# Patient Record
Sex: MALE | Race: BLACK OR AFRICAN AMERICAN | NOT HISPANIC OR LATINO | Employment: OTHER | URBAN - METROPOLITAN AREA
[De-identification: names, ages, dates, MRNs, and addresses within clinical notes are randomized per-mention and may not be internally consistent; named-entity substitution may affect disease eponyms.]

---

## 2017-01-03 ENCOUNTER — TELEPHONE (OUTPATIENT)
Dept: PHARMACY | Facility: CLINIC | Age: 62
End: 2017-01-03

## 2017-01-03 ENCOUNTER — LAB VISIT (OUTPATIENT)
Dept: LAB | Facility: HOSPITAL | Age: 62
End: 2017-01-03
Attending: INTERNAL MEDICINE
Payer: COMMERCIAL

## 2017-01-03 DIAGNOSIS — Z94.0 KIDNEY REPLACED BY TRANSPLANT: ICD-10-CM

## 2017-01-03 LAB
ALBUMIN SERPL BCP-MCNC: 3.6 G/DL
ANION GAP SERPL CALC-SCNC: 10 MMOL/L
ANISOCYTOSIS BLD QL SMEAR: SLIGHT
BASOPHILS # BLD AUTO: ABNORMAL K/UL
BASOPHILS NFR BLD: 0 %
BUN SERPL-MCNC: 22 MG/DL
CALCIUM SERPL-MCNC: 9.6 MG/DL
CHLORIDE SERPL-SCNC: 101 MMOL/L
CO2 SERPL-SCNC: 23 MMOL/L
CREAT SERPL-MCNC: 1.5 MG/DL
DIFFERENTIAL METHOD: ABNORMAL
EOSINOPHIL # BLD AUTO: ABNORMAL K/UL
EOSINOPHIL NFR BLD: 1 %
ERYTHROCYTE [DISTWIDTH] IN BLOOD BY AUTOMATED COUNT: 16.7 %
EST. GFR  (AFRICAN AMERICAN): 58.1 ML/MIN/1.73 M^2
EST. GFR  (NON AFRICAN AMERICAN): 50.2 ML/MIN/1.73 M^2
GLUCOSE SERPL-MCNC: 332 MG/DL
HCT VFR BLD AUTO: 42.5 %
HGB BLD-MCNC: 13.5 G/DL
LYMPHOCYTES # BLD AUTO: ABNORMAL K/UL
LYMPHOCYTES NFR BLD: 14 %
MAGNESIUM SERPL-MCNC: 1.9 MG/DL
MCH RBC QN AUTO: 24.8 PG
MCHC RBC AUTO-ENTMCNC: 31.8 %
MCV RBC AUTO: 78 FL
MONOCYTES # BLD AUTO: ABNORMAL K/UL
MONOCYTES NFR BLD: 5 %
NEUTROPHILS NFR BLD: 79 %
NEUTS BAND NFR BLD MANUAL: 1 %
PHOSPHATE SERPL-MCNC: 2.4 MG/DL
PLATELET # BLD AUTO: 120 K/UL
PLATELET BLD QL SMEAR: ABNORMAL
PMV BLD AUTO: ABNORMAL FL
POLYCHROMASIA BLD QL SMEAR: ABNORMAL
POTASSIUM SERPL-SCNC: 5 MMOL/L
RBC # BLD AUTO: 5.45 M/UL
SODIUM SERPL-SCNC: 134 MMOL/L
TACROLIMUS BLD-MCNC: 8.6 NG/ML
WBC # BLD AUTO: 6.9 K/UL

## 2017-01-03 PROCEDURE — 80197 ASSAY OF TACROLIMUS: CPT

## 2017-01-03 PROCEDURE — 36415 COLL VENOUS BLD VENIPUNCTURE: CPT

## 2017-01-03 PROCEDURE — 83735 ASSAY OF MAGNESIUM: CPT

## 2017-01-03 PROCEDURE — 80069 RENAL FUNCTION PANEL: CPT

## 2017-01-03 PROCEDURE — 85007 BL SMEAR W/DIFF WBC COUNT: CPT

## 2017-01-03 PROCEDURE — 85027 COMPLETE CBC AUTOMATED: CPT

## 2017-01-03 NOTE — PROGRESS NOTES
Phos low -->  on higher phos diet. Glucose elevated --> please advise him to speak with endocrinology

## 2017-01-06 ENCOUNTER — TELEPHONE (OUTPATIENT)
Dept: TRANSPLANT | Facility: CLINIC | Age: 62
End: 2017-01-06

## 2017-01-06 ENCOUNTER — TELEPHONE (OUTPATIENT)
Dept: ENDOCRINOLOGY | Facility: CLINIC | Age: 62
End: 2017-01-06

## 2017-01-06 DIAGNOSIS — Z94.0 KIDNEY REPLACED BY TRANSPLANT: Primary | ICD-10-CM

## 2017-01-06 RX ORDER — INSULIN ASPART 100 [IU]/ML
INJECTION, SOLUTION INTRAVENOUS; SUBCUTANEOUS
Qty: 1 BOX | Refills: 4
Start: 2017-01-06 | End: 2017-01-11 | Stop reason: SDUPTHER

## 2017-01-06 NOTE — TELEPHONE ENCOUNTER
----- Message from Sylvie Frias MD sent at 1/3/2017  1:08 PM CST -----  Phos low -->  on higher phos diet. Glucose elevated --> please advise him to speak with endocrinology

## 2017-01-06 NOTE — TELEPHONE ENCOUNTER
Spoke to the pt. He stated that his BG's have been running high. The pt is taking Prednisone 10 mg. The pt denies drinking soda's or eating any sweets. The pt c/o numbness in his fingers and frequent urination. The pt would like to speak with you.

## 2017-01-06 NOTE — TELEPHONE ENCOUNTER
Spoke to the pt. Informed him to Increase Levemir to 28 units.  Increase Novolog to 12 units before breakfast and lunch.  Increase Novolog to 14 units before dinner.   Continue Novolog correction scale.   Advised the pt to keep follow-up 01/12/2016 for additional adjustments. Pt verbalized understanding.

## 2017-01-06 NOTE — TELEPHONE ENCOUNTER
Patient contacted, advised of MD review of labs.  No changed indicated at this time, reviewed plan for next labs and appt.  Patient verbalized understanding.   Reiterated high phos diet, also advised patient to contact sugar clinic to report readings and see if he can be seen prior to /12/17.

## 2017-01-06 NOTE — TELEPHONE ENCOUNTER
Please call pt.     Increase Levemir to 28 units.  Increase Novolog to 12 units before breakfast and lunch.  Increase Novolog to 14 units before dinner.   Continue Novolog correction scale.   Advise him to keep follow-up 01/12/2016 for additional adjustments.     Please triage call additionally if he requires additional assistance.

## 2017-01-06 NOTE — TELEPHONE ENCOUNTER
----- Message from Dina Chavez sent at 1/6/2017 10:43 AM CST -----  Contact: Patient  Patient is requesting a call back in regards to his sugar levels.    Please call patient at [phone].    1/1/2017          1/2/2017          1/3/2017          1/4/2017          1/5/2017          1/6/2017      BB - Before Breakfast  BL - Before Lunch  BD - Before Dinner

## 2017-01-09 ENCOUNTER — LAB VISIT (OUTPATIENT)
Dept: LAB | Facility: HOSPITAL | Age: 62
End: 2017-01-09
Attending: INTERNAL MEDICINE
Payer: COMMERCIAL

## 2017-01-09 DIAGNOSIS — Z94.0 STATUS POST KIDNEY TRANSPLANT: ICD-10-CM

## 2017-01-09 LAB
ALBUMIN SERPL BCP-MCNC: 3.4 G/DL
ANION GAP SERPL CALC-SCNC: 8 MMOL/L
ANISOCYTOSIS BLD QL SMEAR: SLIGHT
BASOPHILS NFR BLD: 2 %
BUN SERPL-MCNC: 19 MG/DL
CALCIUM SERPL-MCNC: 9.4 MG/DL
CHLORIDE SERPL-SCNC: 103 MMOL/L
CO2 SERPL-SCNC: 24 MMOL/L
CREAT SERPL-MCNC: 1.4 MG/DL
DIFFERENTIAL METHOD: ABNORMAL
EOSINOPHIL NFR BLD: 1 %
ERYTHROCYTE [DISTWIDTH] IN BLOOD BY AUTOMATED COUNT: 16.6 %
EST. GFR  (AFRICAN AMERICAN): >60 ML/MIN/1.73 M^2
EST. GFR  (NON AFRICAN AMERICAN): 54.6 ML/MIN/1.73 M^2
GIANT PLATELETS BLD QL SMEAR: PRESENT
GLUCOSE SERPL-MCNC: 244 MG/DL
HCT VFR BLD AUTO: 41.6 %
HGB BLD-MCNC: 13.1 G/DL
LYMPHOCYTES NFR BLD: 19 %
MAGNESIUM SERPL-MCNC: 1.8 MG/DL
MCH RBC QN AUTO: 24.5 PG
MCHC RBC AUTO-ENTMCNC: 31.5 %
MCV RBC AUTO: 78 FL
METAMYELOCYTES NFR BLD MANUAL: 1 %
MONOCYTES NFR BLD: 4 %
NEUTROPHILS NFR BLD: 70 %
NEUTS BAND NFR BLD MANUAL: 3 %
OVALOCYTES BLD QL SMEAR: ABNORMAL
PHOSPHATE SERPL-MCNC: 2.7 MG/DL
PLATELET # BLD AUTO: 122 K/UL
PMV BLD AUTO: ABNORMAL FL
POIKILOCYTOSIS BLD QL SMEAR: SLIGHT
POLYCHROMASIA BLD QL SMEAR: ABNORMAL
POTASSIUM SERPL-SCNC: 4.8 MMOL/L
RBC # BLD AUTO: 5.35 M/UL
SODIUM SERPL-SCNC: 135 MMOL/L
TACROLIMUS BLD-MCNC: 7.7 NG/ML
WBC # BLD AUTO: 6.57 K/UL

## 2017-01-09 PROCEDURE — 85027 COMPLETE CBC AUTOMATED: CPT

## 2017-01-09 PROCEDURE — 80197 ASSAY OF TACROLIMUS: CPT

## 2017-01-09 PROCEDURE — 83735 ASSAY OF MAGNESIUM: CPT

## 2017-01-09 PROCEDURE — 36415 COLL VENOUS BLD VENIPUNCTURE: CPT

## 2017-01-09 PROCEDURE — 80069 RENAL FUNCTION PANEL: CPT

## 2017-01-09 PROCEDURE — 85007 BL SMEAR W/DIFF WBC COUNT: CPT

## 2017-01-11 ENCOUNTER — TELEPHONE (OUTPATIENT)
Dept: PHARMACY | Facility: CLINIC | Age: 62
End: 2017-01-11

## 2017-01-11 RX ORDER — ERGOCALCIFEROL 1.25 MG/1
50000 CAPSULE ORAL
Qty: 30 CAPSULE | Refills: 0 | OUTPATIENT
Start: 2017-01-11

## 2017-01-11 RX ORDER — DOCUSATE SODIUM 100 MG/1
100 CAPSULE, LIQUID FILLED ORAL 2 TIMES DAILY PRN
Qty: 60 CAPSULE | Refills: 3 | OUTPATIENT
Start: 2017-01-11

## 2017-01-11 RX ORDER — INSULIN ASPART 100 [IU]/ML
INJECTION, SOLUTION INTRAVENOUS; SUBCUTANEOUS
Qty: 1 BOX | Refills: 4 | Status: SHIPPED | OUTPATIENT
Start: 2017-01-11 | End: 2017-01-12 | Stop reason: SDUPTHER

## 2017-01-11 NOTE — TELEPHONE ENCOUNTER
----- Message from Lyla Ramos sent at 1/11/2017 11:43 AM CST -----  Contact: PATIENT  [phone]  veronica  - patient called stating that he has only one pen left - pt needs a refill - call back number [phone]  Thanks

## 2017-01-11 NOTE — TELEPHONE ENCOUNTER
DOCUMENTATION ONLY  [employer] prior authorization approved on 01/10/2017 x 12 weeks.  Approval date: 01/10/2017 to 04/03/2017  PA# [id number]    [employer] co-pay coupon card information:   BIN: [id number]  RxPCN: [id number]  Issuer: ([id number]  Group Number: [id number]  ID#: [id number]

## 2017-01-12 ENCOUNTER — OFFICE VISIT (OUTPATIENT)
Dept: ENDOCRINOLOGY | Facility: CLINIC | Age: 62
End: 2017-01-12
Payer: COMMERCIAL

## 2017-01-12 ENCOUNTER — CLINICAL SUPPORT (OUTPATIENT)
Dept: SMOKING CESSATION | Facility: CLINIC | Age: 62
End: 2017-01-12
Payer: COMMERCIAL

## 2017-01-12 DIAGNOSIS — T38.0X5D ADVERSE EFFECT OF GLUCOCORTICOID OR SYNTHETIC ANALOGUE, SUBSEQUENT ENCOUNTER: ICD-10-CM

## 2017-01-12 DIAGNOSIS — F17.200 NICOTINE DEPENDENCE: Primary | ICD-10-CM

## 2017-01-12 DIAGNOSIS — Z94.0 S/P KIDNEY TRANSPLANT: ICD-10-CM

## 2017-01-12 DIAGNOSIS — Z71.89 COUNSELING REGARDING GOALS OF CARE: ICD-10-CM

## 2017-01-12 DIAGNOSIS — T45.1X5D ADVERSE EFFECT OF IMMUNOSUPPRESSIVE DRUG, SUBSEQUENT ENCOUNTER: ICD-10-CM

## 2017-01-12 DIAGNOSIS — E11.42 DIABETIC POLYNEUROPATHY ASSOCIATED WITH TYPE 2 DIABETES MELLITUS: Primary | ICD-10-CM

## 2017-01-12 PROCEDURE — 90832 PSYTX W PT 30 MINUTES: CPT | Mod: S$GLB,,,

## 2017-01-12 PROCEDURE — 99213 OFFICE O/P EST LOW 20 MIN: CPT | Mod: S$GLB,,, | Performed by: NURSE PRACTITIONER

## 2017-01-12 PROCEDURE — 3060F POS MICROALBUMINURIA REV: CPT | Mod: S$GLB,,, | Performed by: NURSE PRACTITIONER

## 2017-01-12 PROCEDURE — 2022F DILAT RTA XM EVC RTNOPTHY: CPT | Mod: S$GLB,,, | Performed by: NURSE PRACTITIONER

## 2017-01-12 PROCEDURE — 3045F PR MOST RECENT HEMOGLOBIN A1C LEVEL 7.0-9.0%: CPT | Mod: S$GLB,,, | Performed by: NURSE PRACTITIONER

## 2017-01-12 PROCEDURE — 1159F MED LIST DOCD IN RCRD: CPT | Mod: S$GLB,,, | Performed by: NURSE PRACTITIONER

## 2017-01-12 RX ORDER — INSULIN LISPRO 100 [IU]/ML
INJECTION, SOLUTION INTRAVENOUS; SUBCUTANEOUS
Qty: 2 BOX | Refills: 11 | Status: SHIPPED | OUTPATIENT
Start: 2017-01-12 | End: 2017-04-07 | Stop reason: SDUPTHER

## 2017-01-12 RX ORDER — ERGOCALCIFEROL 1.25 MG/1
50000 CAPSULE ORAL
Qty: 4 CAPSULE | Refills: 4 | Status: SHIPPED | OUTPATIENT
Start: 2017-01-12 | End: 2017-01-12 | Stop reason: SDUPTHER

## 2017-01-12 RX ORDER — ERGOCALCIFEROL 1.25 MG/1
50000 CAPSULE ORAL
Qty: 4 CAPSULE | Refills: 4 | Status: SHIPPED | OUTPATIENT
Start: 2017-01-12 | End: 2017-02-05 | Stop reason: ALTCHOICE

## 2017-01-12 RX ORDER — INSULIN ASPART 100 [IU]/ML
INJECTION, SOLUTION INTRAVENOUS; SUBCUTANEOUS
Qty: 2 BOX | Refills: 6 | Status: SHIPPED | OUTPATIENT
Start: 2017-01-12 | End: 2017-01-18 | Stop reason: ALTCHOICE

## 2017-01-12 NOTE — TELEPHONE ENCOUNTER
Initial Harvoni 90/400mg consult completed on 2017. Harvoni 90/400mg picked up today. $5.00 copay. Patient will start Harvoni 90/400mg on 2017. Confirmed 2 patient identifiers - name and . Therapy Appropriate.    Harvoni- Take one tablet by mouth daily x 12 weeks  Counseling was reviewed:   1. Patient MUST take Harvoni at the SAME time every day.   2. Patient MUST avoid acid reducers without consulting with myself or provider first. Antacids are to be spaced out at least 4 hours apart from Harvoni.  Sodium Bicarb will be dosed only at 8am and 2pm.  Famotidine is to be taken AT THE SAME TIME as Harvoni on an EMPTY STOMACH at 8pm.   3. Side effects include headaches and fatigue.   Headache: Patient may treat with OTC remedies. If Tylenol is used, dose should not exceed 2000mg per day.   4. Medication list reviewed. No DDIs or allergies noted. Patient MUST contact myself or provider prior to starting any new OTC, herbal, or prescription drugs to avoid potential DDIs.    DDI: Medication list reviewed and potential DDIs addressed.    Discussed the importance of staying well hydrated while on therapy. Compliance stressed - patient to take missed doses as soon as remembered, but NOT to take 2 doses in one day. Patient will report questions or concerns to myself or practitioner. Patient verbalizes understanding. Patient plans to start Harvoni on 2017. Consultation included: indication; goals of treatment; administration; storage and handling; side effects; how to handle side effects; the importance of compliance; how to handle missed doses; the importance of laboratory monitoring; the importance of keeping all follow up appointments.  Patient understands to report any medication changes to [facility] and provider. All questions answered and addressed to patients satisfaction.  I will f/u with patient in 1 weeks from start, and [facility] will contact patient in 3 weeks to coordinate next refill.

## 2017-01-12 NOTE — PROGRESS NOTES
"Individual Follow-Up Form    1/12/2017    Quit Date: 12/9/16    Clinical Status of Patient: Outpatient    Length of Service: 30mins    Continuing Medication: Yes    Other Medications: N/A     Target Symptoms: Withdrawal and medication side effects. The following were  rated moderate (3) to severe (4) on TCRS:  · Moderate (3): Increased Appetite  · Severe (4): N/A    Comments: Pt has not smoked since December 9th, down from half a pack a day, Pt denies any urges or cravings, states he does not miss smoking , "even after meals", states things are going well post kidney transplant, though Pt would like to start exercising again, Pt feels Patches and Lozenges are helping, planning on staying on prescribed nrt medications for at least three to four months, LPC praised Pt for progress made thus far, especially in spite of the fact the Pt's spouse is still smoking.     Diagnosis: F17.200    Next Visit: 2/6/17  "

## 2017-01-12 NOTE — PROGRESS NOTES
CC:     This patient returns to endocrine clinic for blood sugar log review.  Ravi Zamorano with T2DM since 2001. H/o ESRD and PD: now s/p kidney transplant 11/05/2016.  Followed by endocrine in hospital. Last seen by JIL Baeza NP late November 2016 and is now being seen by me for the first time.     Current Steroid Dose/Taper: prednisone 20 mg PO QD until ; 15 mg PO QD 12/8-1/7; 10 mg PO QD 1/8-2/7; then 5 mg PO QD thereafter  On immunosuppression.       Current DM meds: novolog 12/12/14 units, levemir 28 units daily, tradjenta 5 mg daily     No missed doses of medication.     Monitoring BG ac/hs, brought log to clinic revealing the following readings:    160s-270s     Not experiencing hypoglycemia.  Eating 3 meals daily, plus occ snacking.  No formal exercise.     Ros:  Denies chest pain, SOB  Denies n/v   Lab Results   Component Value Date    HGBA1C 7.7 (H) 11/05/2016    HGBA1C 7.5 (H) 10/23/2014    HGBA1C 7.2 (H) 07/29/2014       ASSESSMENT and PLAN:    1. T2DM uncontrolled, BG goal 140-180 mg/dl, f/u in sugar clinic-2 weeks  Change levemir to 32 units daily  novolog to 16 units ac w/ mod dose correction scale 180-230 +2, etc  Snack 4 units w/ novolog  fit2me info given for foods/nutrition   rx for vit d, novolog flexpen    2. Steroids/immunosuppressants- may increase insulin resistance     3. S/p kidney transplant - managed per KTS    4. Counseling         Time spent: 20 minutes, >50% in counseling

## 2017-01-12 NOTE — TELEPHONE ENCOUNTER
----- Message from Kelly Lopez sent at 1/12/2017  3:25 PM CST -----  Contact: [facility] called and said the insulin aspart (NOVOLOG) 100 unit/mL InPn pen needs to be changed to the humalog because of insurance.

## 2017-01-16 ENCOUNTER — LAB VISIT (OUTPATIENT)
Dept: LAB | Facility: HOSPITAL | Age: 62
End: 2017-01-16
Attending: INTERNAL MEDICINE
Payer: COMMERCIAL

## 2017-01-16 DIAGNOSIS — Z94.0 KIDNEY REPLACED BY TRANSPLANT: ICD-10-CM

## 2017-01-16 LAB
ALBUMIN SERPL BCP-MCNC: 3.4 G/DL
ANION GAP SERPL CALC-SCNC: 6 MMOL/L
ANISOCYTOSIS BLD QL SMEAR: SLIGHT
BASOPHILS # BLD AUTO: ABNORMAL K/UL
BASOPHILS NFR BLD: 1 %
BUN SERPL-MCNC: 17 MG/DL
BURR CELLS BLD QL SMEAR: ABNORMAL
CALCIUM SERPL-MCNC: 9 MG/DL
CHLORIDE SERPL-SCNC: 107 MMOL/L
CO2 SERPL-SCNC: 25 MMOL/L
CREAT SERPL-MCNC: 1.2 MG/DL
DACRYOCYTES BLD QL SMEAR: ABNORMAL
DIFFERENTIAL METHOD: ABNORMAL
EOSINOPHIL # BLD AUTO: ABNORMAL K/UL
EOSINOPHIL NFR BLD: 0 %
ERYTHROCYTE [DISTWIDTH] IN BLOOD BY AUTOMATED COUNT: 16.7 %
EST. GFR  (AFRICAN AMERICAN): >60 ML/MIN/1.73 M^2
EST. GFR  (NON AFRICAN AMERICAN): >60 ML/MIN/1.73 M^2
GLUCOSE SERPL-MCNC: 165 MG/DL
HCT VFR BLD AUTO: 40.8 %
HGB BLD-MCNC: 12.9 G/DL
HYPOCHROMIA BLD QL SMEAR: ABNORMAL
LYMPHOCYTES # BLD AUTO: ABNORMAL K/UL
LYMPHOCYTES NFR BLD: 16 %
MAGNESIUM SERPL-MCNC: 1.5 MG/DL
MCH RBC QN AUTO: 24.6 PG
MCHC RBC AUTO-ENTMCNC: 31.6 %
MCV RBC AUTO: 78 FL
MONOCYTES # BLD AUTO: ABNORMAL K/UL
MONOCYTES NFR BLD: 10 %
NEUTROPHILS NFR BLD: 68 %
NEUTS BAND NFR BLD MANUAL: 5 %
OVALOCYTES BLD QL SMEAR: ABNORMAL
PHOSPHATE SERPL-MCNC: 2.9 MG/DL
PLATELET # BLD AUTO: 124 K/UL
PMV BLD AUTO: ABNORMAL FL
POIKILOCYTOSIS BLD QL SMEAR: SLIGHT
POLYCHROMASIA BLD QL SMEAR: ABNORMAL
POTASSIUM SERPL-SCNC: 4.3 MMOL/L
RBC # BLD AUTO: 5.24 M/UL
SCHISTOCYTES BLD QL SMEAR: ABNORMAL
SODIUM SERPL-SCNC: 138 MMOL/L
TACROLIMUS BLD-MCNC: 11 NG/ML
WBC # BLD AUTO: 5.95 K/UL

## 2017-01-16 PROCEDURE — 80069 RENAL FUNCTION PANEL: CPT

## 2017-01-16 PROCEDURE — 36415 COLL VENOUS BLD VENIPUNCTURE: CPT

## 2017-01-16 PROCEDURE — 80197 ASSAY OF TACROLIMUS: CPT

## 2017-01-16 PROCEDURE — 83735 ASSAY OF MAGNESIUM: CPT

## 2017-01-16 PROCEDURE — 85007 BL SMEAR W/DIFF WBC COUNT: CPT

## 2017-01-16 PROCEDURE — 85027 COMPLETE CBC AUTOMATED: CPT

## 2017-01-16 NOTE — PROGRESS NOTES
Prograf elevated --> but has been steady on this dose for a few weeks thus repeat level to determine need for dose adjustment.

## 2017-01-16 NOTE — PROGRESS NOTES
He is scheduled for labs on 1/23, do you want it repeated before then?   Any adjustments with Mag oxide? Or repeat Mag as well

## 2017-01-17 ENCOUNTER — TELEPHONE (OUTPATIENT)
Dept: TRANSPLANT | Facility: CLINIC | Age: 62
End: 2017-01-17

## 2017-01-17 ENCOUNTER — NURSE TRIAGE (OUTPATIENT)
Dept: ADMINISTRATIVE | Facility: CLINIC | Age: 62
End: 2017-01-17

## 2017-01-17 NOTE — TELEPHONE ENCOUNTER
Pt contacted and advised of MD's lab review and instructions.  Pt. repeated instructions and verbalized understanding.

## 2017-01-17 NOTE — TELEPHONE ENCOUNTER
----- Message from Sylvie Frias MD sent at 1/16/2017 11:46 PM CST -----  Yes please get repeat labs later this week and we can see what repeat Mg is as well.     Sylvie     ----- Message -----     From: Kena Robles RN     Sent: 1/16/2017   4:26 PM       To: Sylvie Frias MD    He is scheduled for labs on 1/23, do you want it repeated before then?   Any adjustments with Mag oxide? Or repeat Mag as well

## 2017-01-17 NOTE — TELEPHONE ENCOUNTER
Reason for Disposition   Caller has already spoken with the PCP and has no further questions.    Protocols used: ST NO CONTACT OR DUPLICATE CONTACT CALL-A-AH    No longer needed out service    Josephine King RN

## 2017-01-18 ENCOUNTER — LAB VISIT (OUTPATIENT)
Dept: LAB | Facility: HOSPITAL | Age: 62
End: 2017-01-18
Attending: INTERNAL MEDICINE
Payer: COMMERCIAL

## 2017-01-18 ENCOUNTER — TELEPHONE (OUTPATIENT)
Dept: HEPATOLOGY | Facility: CLINIC | Age: 62
End: 2017-01-18

## 2017-01-18 ENCOUNTER — OFFICE VISIT (OUTPATIENT)
Dept: TRANSPLANT | Facility: CLINIC | Age: 62
End: 2017-01-18
Payer: COMMERCIAL

## 2017-01-18 VITALS
TEMPERATURE: 98 F | HEIGHT: 73 IN | OXYGEN SATURATION: 99 % | RESPIRATION RATE: 18 BRPM | HEART RATE: 93 BPM | SYSTOLIC BLOOD PRESSURE: 129 MMHG | DIASTOLIC BLOOD PRESSURE: 81 MMHG | WEIGHT: 185.44 LBS | BODY MASS INDEX: 24.58 KG/M2

## 2017-01-18 DIAGNOSIS — Z94.0 KIDNEY REPLACED BY TRANSPLANT: ICD-10-CM

## 2017-01-18 DIAGNOSIS — Z94.0 S/P KIDNEY TRANSPLANT: ICD-10-CM

## 2017-01-18 DIAGNOSIS — E87.20 ACIDOSIS: ICD-10-CM

## 2017-01-18 DIAGNOSIS — Z29.89 PROPHYLACTIC IMMUNOTHERAPY: ICD-10-CM

## 2017-01-18 DIAGNOSIS — N18.2 CKD (CHRONIC KIDNEY DISEASE) STAGE 2, GFR 60-89 ML/MIN: Primary | Chronic | ICD-10-CM

## 2017-01-18 DIAGNOSIS — B18.2 CHRONIC HEPATITIS C WITHOUT HEPATIC COMA: Primary | ICD-10-CM

## 2017-01-18 DIAGNOSIS — N40.1 BENIGN PROSTATIC HYPERTROPHY WITH URINARY FREQUENCY: ICD-10-CM

## 2017-01-18 DIAGNOSIS — I15.0 RENOVASCULAR HYPERTENSION: ICD-10-CM

## 2017-01-18 DIAGNOSIS — Z79.60 LONG-TERM USE OF IMMUNOSUPPRESSANT MEDICATION: ICD-10-CM

## 2017-01-18 DIAGNOSIS — E83.39 HYPOPHOSPHATEMIA: ICD-10-CM

## 2017-01-18 DIAGNOSIS — R35.0 BENIGN PROSTATIC HYPERTROPHY WITH URINARY FREQUENCY: ICD-10-CM

## 2017-01-18 LAB — TACROLIMUS BLD-MCNC: 12.6 NG/ML

## 2017-01-18 PROCEDURE — 99999 PR PBB SHADOW E&M-EST. PATIENT-LVL III: CPT | Mod: PBBFAC,,, | Performed by: INTERNAL MEDICINE

## 2017-01-18 PROCEDURE — 80197 ASSAY OF TACROLIMUS: CPT

## 2017-01-18 PROCEDURE — 36415 COLL VENOUS BLD VENIPUNCTURE: CPT

## 2017-01-18 PROCEDURE — 1159F MED LIST DOCD IN RCRD: CPT | Mod: S$GLB,,, | Performed by: INTERNAL MEDICINE

## 2017-01-18 PROCEDURE — 99215 OFFICE O/P EST HI 40 MIN: CPT | Mod: S$GLB,,, | Performed by: INTERNAL MEDICINE

## 2017-01-18 RX ORDER — TACROLIMUS 1 MG/1
1 CAPSULE ORAL EVERY 12 HOURS
Qty: 60 CAPSULE | Refills: 11 | Status: SHIPPED | OUTPATIENT
Start: 2017-01-18 | End: 2017-01-30 | Stop reason: SDUPTHER

## 2017-01-18 NOTE — LETTER
January 18, 2017        Alexi Glasgow  [address]  [address]  [facility] [address]  Phone: [phone]  Fax: [phone]             [facility]  [address]  [facility] [address]  Phone: [phone]   Patient: Ravi Zamorano   MR Number: [id number]   YOB: 1955   Date of Visit: 1/18/2017       Dear Dr. Alexi Glasgow    Thank you for referring Ravi Zamorano to me for evaluation. Attached you will find relevant portions of my assessment and plan of care.    If you have questions, please do not hesitate to call me. I look forward to following Ravi Zamorano along with you.    Sincerely,    Risa Blackman MD    Enclosure    If you would like to receive this communication electronically, please contact [email or web address] or [phone] to request [employer] Link access.    [employer] Link is a tool which provides read-only access to select patient information with whom you have a relationship. Its easy to use and provides real time access to review your patients record including encounter summaries, notes, results, and demographic information.    If you feel you have received this communication in error or would no longer like to receive these types of communications, please e-mail [email or web address]

## 2017-01-18 NOTE — PROGRESS NOTES
Kidney Post-Transplant Assessment    Referring Physician: Alexi Glasgow  Current Nephrologist: Alexi Glasgow    ORGAN: RIGHT KIDNEY  Donor Type:  - brain death  PHS Increased Risk: yes  Cold Ischemia: 314 mins  Induction Medications: thymoglobulin    Subjective:     CC:  Reassessment of renal allograft function and management of chronic immunosuppression.    HPI:  Mr. Zamorano is a 61 y.o. year old Black or  male who received a  - brain death kidney transplant on 16. His most recent creatinine is 1.2. He takes mycophenolate mofetil, prednisone and tacrolimus for maintenance immunosuppression. His post transplant course has been uncomplicated to date.    Pertinent History:  -ESRD presumed secondary to DM/HTN who was on RRT since 5/6/15  -PHS increased risk DDRT on 16 (THYMO induction given HCV POS/POS) with  KDPI 85%  -Donor RPR positive thus recipient received PCN x3 doses;   -CMV status D+/R+   -HCV rx'd dom [place] 2016    Ravi reports no complaints. Upon completing ROS, though, he mentions he still urinates q1 hr at night. He has no pain over allograft or dysuria or foul odor. He has no concerns.    Review of Systems   Constitutional: Negative.  Negative for fatigue.   Eyes: Negative for visual disturbance.   Respiratory: Negative for shortness of breath.    Cardiovascular: Negative for chest pain and leg swelling.   Gastrointestinal: Negative for abdominal pain.   Genitourinary: Positive for frequency (Predating transplant). Negative for dysuria, flank pain and urgency.   Musculoskeletal: Negative for joint swelling.   Skin: Negative for rash.   Allergic/Immunologic: Positive for immunocompromised state.   Neurological: Negative for tremors and headaches.   Hematological: Does not bruise/bleed easily.   Psychiatric/Behavioral: Negative.        Objective:   Blood pressure 129/81, pulse 93, temperature 97.7 °F (36.5 °C), temperature source Oral, resp.  "rate 18, height 6' 1" (1.854 m), weight 84.1 kg (185 lb 6.5 oz), SpO2 99 %.body mass index is 24.46 kg/(m^2).    Physical Exam   Constitutional: He is oriented to person, place, and time. He appears well-developed and well-nourished.   HENT:   Head: Normocephalic.   Eyes: Conjunctivae are normal.   Neck: No JVD present.   Pulmonary/Chest: Effort normal and breath sounds normal.   Abdominal: Soft. He exhibits no mass. There is no tenderness.   Musculoskeletal: He exhibits no edema.   Lymphadenopathy:     He has no cervical adenopathy.   Neurological: He is alert and oriented to person, place, and time.   Skin: Skin is dry. No rash noted.   Psychiatric: He has a normal mood and affect. Judgment normal.         Labs:  Lab Results   Component Value Date    WBC 5.95 01/16/2017    HGB 12.9 (L) 01/16/2017    HCT 40.8 01/16/2017     01/16/2017    K 4.3 01/16/2017     01/16/2017    CO2 25 01/16/2017    BUN 17 01/16/2017    CREATININE 1.2 01/16/2017    EGFRNONAA >60.0 01/16/2017    CALCIUM 9.0 01/16/2017    PHOS 2.9 01/16/2017    MG 1.5 (L) 01/16/2017    ALBUMIN 3.4 (L) 01/16/2017    AST 23 12/05/2016    ALT 27 12/05/2016    UTPCR 0.13 12/28/2016    .0 (H) 11/08/2016    TACROLIMUS 12.6 01/18/2017   Labs were reviewed with the patient    Assessment:     1. CKD (chronic kidney disease) stage 2, GFR 60-89 ml/min    2. S/P cadaveric kidney transplant 11/5/2016. ESRD secondary to HTN/DMII    3. Hypophosphatemia    4. Prophylactic immunotherapy    5. Renovascular hypertension    6. Acidosis    7. Long-term use of immunosuppressant medication    8. Benign prostatic hypertrophy with urinary frequency      Plan:      Allograft function assessment   CKDII s/p kidney transplantation- with excellent fcn of allograft. Continue to monitor renal function and electrolytes, HTN, secondary hyperparathyroidism and other issues related to underlying ESRD.    Lab Results   Component Value Date    CREATININE 1.2 01/16/2017 "     Immunosuppression evaluation  -Tacrolimus lowered to 1 mg BID (2/1). Cont MMF w/o changes.  -Will trend immunosuppression levels drug toxicity; Also monitor for med-related side effects.        Hepatitis C infection, awaiting genotype confirmation  -On Harvoni    Urinary frequency, predating transplant - has not seen  yet since last visit  -Urology consult will be requested (he wishes to see female). Symptoms are suspicious of prostatism.    Evaluation for bone marrow suppression (r/t immunosuppression toxicity or infection)  Lab Results   Component Value Date    WBC 5.95 01/16/2017    HGB 12.9 (L) 01/16/2017    HCT 40.8 01/16/2017     (L) 01/16/2017   All are within acceptable range, continue close monitoring    Anti-infective prophylaxis  - continue Bactrim for PJP prophylaxis until 11/5/17  - continue Valcyte for CMV prophylaxis until 2/5/17    Renal hypertension  BP well controlled. Cont to monitor.    Metabolic bone disease [Secondary hyperparathyroidism, Phosphorus metabolism disorders]  Lab Results   Component Value Date    .0 (H) 11/08/2016    CALCIUM 9.0 01/16/2017    CAION 1.11 11/06/2016    PHOS 2.9 01/16/2017   -Secondary hyperparathyroidism to be managed by nephrologist  -Hypophosphatemia - corrected. Stop KPN suppl and trend.    Assessment of electrolytes  Lab Results   Component Value Date     01/16/2017    K 4.3 01/16/2017    CO2 25 01/16/2017     01/16/2017    MG 1.5 (L) 01/16/2017   All electrolytes are noted, no major issues.  Mild hypomagnesemia Noted, on mag ox supplementation. Continue monitoring  Acidosis resolved- stop HCO3 today    Screening for BK infection to prevent allograft dysfunction  Lab Results   Component Value Date    BKVIRUSPCRQB <250 12/05/2016    -Not detected Dec 2016  Continue blood PCR screening as per program guidelines to prevent BK viremia and nephropathy leading to allograft dysfunction and potential graft failure    Screening for  proteinuria  Lab Results   Component Value Date    PROTEINURINE 8 12/28/2016    CREATRANDUR 62.0 12/28/2016    UTPCR 0.13 12/28/2016     -Minimal. Continue monitoring as per program guidelines     Follow-up:   Clinic: return to transplant clinic weekly for the first month after transplant; every 2 weeks during months 2-3; then at 6-, 9-, 12-, 18-, 24-, and 36- months post-transplant to reassess for complications from immunosuppression toxicity and monitor for rejection.  Annually thereafter.    Labs: since patient remains at high risk for rejection and drug-related complications that warrant close monitoring, labs will be ordered as follows: continue twice weekly CBC, renal panel, and drug level for first month; then same labs once weekly through 3rd month post-transplant.  Urine for UA and protein/creatinine ratio monthly.  Urine BK - PCR at 1-, 3-, 6-, 9-, 12-, 18-, 24-, and 36- months post-transplant.  Hepatic panel at 1-, 2-, 3-, 6-, 9-, 12-, 18-, 24-, and 36- months post-transplant.    Risa Blackman MD       Education:   Material provided to the patient.  Patient reminded to call with any health changes since these can be early signs of significant complications.  Also, I advised the patient to be sure any new medications or changes of old medications are discussed with either a pharmacist or physician knowledgeable with transplant to avoid rejection/drug toxicity related to significant drug interactions.

## 2017-01-18 NOTE — MR AVS SNAPSHOT
[facility]- Transplant  [address]  [place] [address]  Phone: [phone]                  Ravi Zamorano   2017 1:30 PM   Office Visit    Description:  Male : 1955   Provider:  Risa Blackman MD   Department:  [facility]                To Do List           Future Appointments        Provider Department Dept Phone    2017 8:50 AM LAB, TRANSPLANT [facility] [phone]    2017 8:35 AM LAB, TRANSPLANT [facility] [phone]    2017 8:35 AM LAB, TRANSPLANT [facility] [phone]    2017 9:15 AM SPECIMEN LAB, TRANSPLANT [facility] [phone]    2017 1:30 PM Risa Blackman MD [facility] [phone]      Goals (5 Years of Data)     None       These Medications        Disp Refills Start End    tacrolimus (PROGRAF) 1 MG Cap 60 capsule 11 2017     Take 1 capsule (1 mg total) by mouth every 12 (twelve) hours. Z94.0 kidney transplant 2016 - Oral    Pharmacy: [facility] [place] [address] Ph #: [phone]       Notes to Pharmacy: Kidney Transplant z94.0; 16      [facility] On Call     [facility] Care Line - 24/ Assistance  Registered nurses in the [facility] Center provide clinical advisement, health education, appointment booking, and other advisory services.  Call for this free service at [phone].             Medications           Message regarding Medications     Verify the changes and/or additions to your medication regime listed below are the same as discussed with your clinician today.  If any of these changes or additions are incorrect, please notify your healthcare provider.        CHANGE how you are taking these medications     Start Taking Instead of    tacrolimus (PROGRAF) 1 MG Cap tacrolimus (PROGRAF) 1 MG Cap    Dosage:  Take 1 capsule (1 mg total) by  mouth every 12 (twelve) hours. Z94.0 kidney transplant 11/5/2016 Dosage:  Take 2 mg in the AM by mouth and 1 mg in the PM by mouth Z94.0 kidney transplant 11/5/2016    Reason for Change:  Reorder       STOP taking these medications     insulin aspart (NOVOLOG) 100 unit/mL InPn pen Inject into skin 16 units w/ meals. Plus correction with goal 180, ISF 25, 180-230 +2 231-280 +4, 281-330 +6, 331-380 +8.    k phos di & mono-sod phos mono (K-PHOS-NEUTRAL) 250 mg Tab Take 1 tablet by mouth 2 (two) times daily.    sodium bicarbonate 650 MG tablet Take 1 tablet (650 mg total) by mouth 2 (two) times daily. Take at 8 am and 2 pm daily           Verify that the below list of medications is an accurate representation of the medications you are currently taking.  If none reported, the list may be blank. If incorrect, please contact your healthcare provider. Carry this list with you in case of emergency.           Current Medications     aspirin 81 MG Chew Take 81 mg by mouth once daily.    atorvastatin (LIPITOR) 10 MG tablet Take 10 mg by mouth once daily.    blood sugar diagnostic Strp 1 strip by Misc.(Non-Drug; Combo Route) route 4 (four) times daily before meals and nightly. e11.65    blood-glucose meter (FREESTYLE SYSTEM KIT) kit Use as instructed e11.65 may substitute meter as covered by insurance    carvedilol (COREG) 12.5 MG tablet Take 12.5 mg by mouth 2 (two) times daily.    docusate sodium (COLACE) 100 MG capsule Take 1 capsule (100 mg total) by mouth 2 (two) times daily as needed for Constipation.    ergocalciferol (ERGOCALCIFEROL) 50,000 unit Cap Take 1 capsule (50,000 Units total) by mouth every 7 days.    famotidine (PEPCID) 20 MG tablet Take 1 tablet (20 mg total) by mouth every evening.    HARVONI  mg Tab Take 1 tablet by mouth once daily. Take at same time as Famotidine 20mg    insulin detemir (LEVEMIR FLEXTOUCH) 100 unit/mL (3 mL) SubQ InPn pen Inject 28 Units into the skin once daily.    insulin lispro  "(HUMALOG KWIKPEN) 100 unit/mL InPn pen Inject 16 units w/ meals plus scale as directed. 4 units w/ bedtime snack.    lancets Misc 1 each by Misc.(Non-Drug; Combo Route) route 4 (four) times daily before meals and nightly. e11.65    linagliptin (TRADJENTA) 5 mg Tab tablet Take 1 tablet (5 mg total) by mouth once daily.    magnesium oxide (MAG-OX) 400 mg tablet Take 1 tablet (400 mg total) by mouth 2 (two) times daily.    multivitamin (THERAGRAN) tablet Take 1 tablet by mouth once daily.    mycophenolate (CELLCEPT) 250 mg Cap Take 4 capsules (1,000 mg total) by mouth 2 (two) times daily.    nicotine (NICODERM CQ) 7 mg/24 hr Place 1 patch onto the skin once daily. NAME BRAND ONLY    nicotine polacrilex 2 MG Lozg Take 1 lozenge (2 mg total) by mouth as needed (6-16 lozenges daily as needed). MINT    nifedipine 30 MG ORAL TR24 (PROCARDIA-XL) 30 MG (OSM) 24 hr tablet Take 1 tablet (30 mg total) by mouth once daily.    ONETOUCH ULTRA TEST Strp     pen needle, diabetic 32 gauge x 5/32" Ndle 1 each by Misc.(Non-Drug; Combo Route) route 4 (four) times daily.    predniSONE (DELTASONE) 10 MG tablet Take 20 mg PO QD 11/8-12/7; 15 mg PO QD 12/8-1/7; 10 mg PO QD 1/8-2/7; then 5 mg PO QD thereafter    ropinirole (REQUIP) 0.5 MG tablet Take 0.5 mg by mouth once daily.    sulfamethoxazole-trimethoprim 400-80mg (BACTRIM,SEPTRA) 400-80 mg per tablet Take 1 tablet by mouth once daily. Stop: 11/5/17    tacrolimus (PROGRAF) 1 MG Cap Take 1 capsule (1 mg total) by mouth every 12 (twelve) hours. Z94.0 kidney transplant 11/5/2016    valganciclovir (VALCYTE) 450 mg Tab Take 2 tablets (900 mg total) by mouth once daily. Stop: 2/3/17    oxycodone-acetaminophen (PERCOCET) 5-325 mg per tablet Take 1 tablet by mouth every 4 (four) hours as needed for Pain.           Clinical Reference Information           Vital Signs - Last Recorded  Most recent update: 1/18/2017  1:17 PM by Ruthy Krueger LPN    BP Pulse Temp Resp Ht Wt    129/81 (BP Location: " "Right arm, Patient Position: Sitting, BP Method: Automatic) 93 97.7 °F (36.5 °C) (Oral) 18 6' 1" (1.854 m) 84.1 kg (185 lb 6.5 oz)    SpO2 BMI             99% 24.46 kg/m2         Blood Pressure          Most Recent Value    BP  129/81      Allergies as of 1/18/2017     No Known Allergies      Immunizations Administered on Date of Encounter - 1/18/2017     None      Maintenance Dialysis History     Start End Type Comments Center    5/6/2015  [facility]            Current D[facility] Center Information     [facility] [address] Phone #:  [phone]    Contact:  [place]  [address] Fax #:  [phone]            Transplant Information        Txp Date Organ Coordinator Care Team    11/5/2016 Kidney Kena Robles RN Current Nephrologist:  Alexi Glasgow MD   Referring Physician:  Alexi Glasgow MD   Surgeon:  Constantino Gillette MD   Assisting Surgeon:  Shayy Angeles MD         [facility] Sign-Up     Activating your [facility] account is as easy as 1-2-3!     1) Visit [email or web address], select Sign Up Now, enter this activation code and your date of birth, then select Next.  [email or web address]  Expires: 2/11/2017  3:04 PM      2) Create a username and password to use when you visit [facility] in the future and select a security question in case you lose your password and select Next.    3) Enter your e-mail address and click Sign Up!    Additional Information  If you have questions, please e-mail [email or web address] or call [phone] to talk to our [facility] staff. Remember, [facility] is NOT to be used for urgent needs. For medical emergencies, dial 911.         Instructions    From Dr. Barker:  It is my privilege to participate in your post-transplant care!  Please be sure to let us know if you have any questions or concerns about your health care - we cannot help you if we do not know.  Don't forget we are on call 24/7 for any emergencies.   Best Wishes,  Dr. Lucero Huff " Coit

## 2017-01-18 NOTE — PROGRESS NOTES
Prograf elevated --> if true 12 hour trough decrease Prograf from 2/1 to 1 mg BID. Repeat labs next week

## 2017-01-18 NOTE — Clinical Note
Urology appt requested 12/2016 for h/o prostate issues and urinary frequency. He requests FEMALE urologist.

## 2017-01-18 NOTE — PATIENT INSTRUCTIONS
From Dr. Barker:  It is my privilege to participate in your post-transplant care!  Please be sure to let us know if you have any questions or concerns about your health care - we cannot help you if we do not know.  Don't forget we are on call 24/7 for any emergencies.   Best Wishes,  Dr. Lucero Blackman

## 2017-01-18 NOTE — TELEPHONE ENCOUNTER
Pt beginning 12 weeks of Harvoni on 1/12/17  Post kidney transplant  Nicole 1a, tx naive, F0-1    Pls schedule:  - CMP, HCV RNA at week 4 - 2/8  - CMP at week 8 - 3/8  - CMP, HCV RNA at week 12 - end of treatment - 4/5    thanks

## 2017-01-19 ENCOUNTER — TELEPHONE (OUTPATIENT)
Dept: PHARMACY | Facility: CLINIC | Age: 62
End: 2017-01-19

## 2017-01-19 NOTE — TELEPHONE ENCOUNTER
I spoke with patient.  Labs scheduled as ordered.  Reminder notices along with message from provider mailed to patient.

## 2017-01-23 ENCOUNTER — LAB VISIT (OUTPATIENT)
Dept: LAB | Facility: HOSPITAL | Age: 62
End: 2017-01-23
Attending: INTERNAL MEDICINE
Payer: COMMERCIAL

## 2017-01-23 DIAGNOSIS — Z94.0 KIDNEY REPLACED BY TRANSPLANT: ICD-10-CM

## 2017-01-23 DIAGNOSIS — R71.8 MICROCYTOSIS: Primary | ICD-10-CM

## 2017-01-23 LAB
ALBUMIN SERPL BCP-MCNC: 3.5 G/DL
ANION GAP SERPL CALC-SCNC: 5 MMOL/L
ANISOCYTOSIS BLD QL SMEAR: SLIGHT
BASOPHILS # BLD AUTO: ABNORMAL K/UL
BASOPHILS NFR BLD: 1 %
BUN SERPL-MCNC: 16 MG/DL
CALCIUM SERPL-MCNC: 9.3 MG/DL
CHLORIDE SERPL-SCNC: 106 MMOL/L
CO2 SERPL-SCNC: 26 MMOL/L
CREAT SERPL-MCNC: 1.3 MG/DL
DIFFERENTIAL METHOD: ABNORMAL
EOSINOPHIL # BLD AUTO: ABNORMAL K/UL
EOSINOPHIL NFR BLD: 1 %
ERYTHROCYTE [DISTWIDTH] IN BLOOD BY AUTOMATED COUNT: 16.4 %
EST. GFR  (AFRICAN AMERICAN): >60 ML/MIN/1.73 M^2
EST. GFR  (NON AFRICAN AMERICAN): 59.7 ML/MIN/1.73 M^2
GLUCOSE SERPL-MCNC: 230 MG/DL
HCT VFR BLD AUTO: 41.1 %
HGB BLD-MCNC: 12.8 G/DL
LYMPHOCYTES # BLD AUTO: ABNORMAL K/UL
LYMPHOCYTES NFR BLD: 19 %
MAGNESIUM SERPL-MCNC: 1.9 MG/DL
MCH RBC QN AUTO: 25 PG
MCHC RBC AUTO-ENTMCNC: 31.1 %
MCV RBC AUTO: 80 FL
MONOCYTES # BLD AUTO: ABNORMAL K/UL
MONOCYTES NFR BLD: 6 %
MYELOCYTES NFR BLD MANUAL: 3 %
NEUTROPHILS NFR BLD: 69 %
NEUTS BAND NFR BLD MANUAL: 1 %
PHOSPHATE SERPL-MCNC: 2.3 MG/DL
PLATELET # BLD AUTO: 112 K/UL
PLATELET BLD QL SMEAR: ABNORMAL
PMV BLD AUTO: ABNORMAL FL
POIKILOCYTOSIS BLD QL SMEAR: ABNORMAL
POLYCHROMASIA BLD QL SMEAR: ABNORMAL
POTASSIUM SERPL-SCNC: 4.8 MMOL/L
RBC # BLD AUTO: 5.13 M/UL
SODIUM SERPL-SCNC: 137 MMOL/L
TACROLIMUS BLD-MCNC: 7.6 NG/ML
WBC # BLD AUTO: 5.35 K/UL

## 2017-01-23 PROCEDURE — 36415 COLL VENOUS BLD VENIPUNCTURE: CPT

## 2017-01-23 PROCEDURE — 85027 COMPLETE CBC AUTOMATED: CPT

## 2017-01-23 PROCEDURE — 80197 ASSAY OF TACROLIMUS: CPT

## 2017-01-23 PROCEDURE — 85007 BL SMEAR W/DIFF WBC COUNT: CPT

## 2017-01-23 PROCEDURE — 83735 ASSAY OF MAGNESIUM: CPT

## 2017-01-23 PROCEDURE — 80069 RENAL FUNCTION PANEL: CPT

## 2017-01-23 NOTE — PROGRESS NOTES
Phos low again but has not been persistently low -->  on higher phos diet. Glucose elevated. Have dietician speak with him and stress importance of endocrine f/u.

## 2017-01-23 NOTE — PROGRESS NOTES
Prograf close to  Goal --> continue current dose for now; will see if remains low at time of next labs to see if he needs dose adjustment

## 2017-01-25 ENCOUNTER — TELEPHONE (OUTPATIENT)
Dept: TRANSPLANT | Facility: CLINIC | Age: 62
End: 2017-01-25

## 2017-01-25 NOTE — TELEPHONE ENCOUNTER
----- Message from Sylvie Frias MD sent at 1/23/2017 10:10 AM CST -----  Phos low again but has not been persistently low -->  on higher phos diet. Glucose elevated. Have dietician speak with him and stress importance of endocrine f/u.

## 2017-01-26 ENCOUNTER — TELEPHONE (OUTPATIENT)
Dept: TRANSPLANT | Facility: CLINIC | Age: 62
End: 2017-01-26

## 2017-01-26 NOTE — TELEPHONE ENCOUNTER
Contacted pt per request of Dr. Frias due to pt with elevated gluc levels.  Pt is being followed by endocrine and states he is awaiting a call back for a follow-up appt.  States he has been following a diabetic diet and taking his insulin as prescribed.  Pt states he is also logging his gluc levels.  Offered diet education-pt declined. Reports he is knowledgeable of diabetic diet.  Answered all of pt's nutrition questions and encouraged to adhere to diabetic diet.  Provided with contact info and encouraged to contact with any nutrition questions/concerns.    Karley Arredondo, MS RD LDN

## 2017-01-26 NOTE — TELEPHONE ENCOUNTER
Patient contacted, discussed MD review of labs, advised to contact endocrinology for follow up, and to report sugar log.  Patient verbalized understanding.

## 2017-01-30 ENCOUNTER — LAB VISIT (OUTPATIENT)
Dept: LAB | Facility: HOSPITAL | Age: 62
End: 2017-01-30
Attending: INTERNAL MEDICINE
Payer: COMMERCIAL

## 2017-01-30 ENCOUNTER — TELEPHONE (OUTPATIENT)
Dept: TRANSPLANT | Facility: CLINIC | Age: 62
End: 2017-01-30

## 2017-01-30 DIAGNOSIS — Z94.0 KIDNEY REPLACED BY TRANSPLANT: ICD-10-CM

## 2017-01-30 DIAGNOSIS — F17.200 NICOTINE DEPENDENCE: ICD-10-CM

## 2017-01-30 DIAGNOSIS — R71.8 MICROCYTOSIS: ICD-10-CM

## 2017-01-30 LAB
ALBUMIN SERPL BCP-MCNC: 3.5 G/DL
ANION GAP SERPL CALC-SCNC: 7 MMOL/L
ANISOCYTOSIS BLD QL SMEAR: SLIGHT
BASOPHILS # BLD AUTO: ABNORMAL K/UL
BASOPHILS NFR BLD: 2 %
BUN SERPL-MCNC: 18 MG/DL
CALCIUM SERPL-MCNC: 9.8 MG/DL
CHLORIDE SERPL-SCNC: 106 MMOL/L
CO2 SERPL-SCNC: 26 MMOL/L
CREAT SERPL-MCNC: 1.2 MG/DL
DIFFERENTIAL METHOD: ABNORMAL
EOSINOPHIL # BLD AUTO: ABNORMAL K/UL
EOSINOPHIL NFR BLD: 0 %
ERYTHROCYTE [DISTWIDTH] IN BLOOD BY AUTOMATED COUNT: 16.2 %
EST. GFR  (AFRICAN AMERICAN): >60 ML/MIN/1.73 M^2
EST. GFR  (NON AFRICAN AMERICAN): >60 ML/MIN/1.73 M^2
FERRITIN SERPL-MCNC: 251 NG/ML
GLUCOSE SERPL-MCNC: 188 MG/DL
HCT VFR BLD AUTO: 41.6 %
HGB BLD-MCNC: 13.1 G/DL
IRON SERPL-MCNC: 100 UG/DL
LYMPHOCYTES # BLD AUTO: ABNORMAL K/UL
LYMPHOCYTES NFR BLD: 11 %
MAGNESIUM SERPL-MCNC: 1.9 MG/DL
MCH RBC QN AUTO: 24.9 PG
MCHC RBC AUTO-ENTMCNC: 31.5 %
MCV RBC AUTO: 79 FL
METAMYELOCYTES NFR BLD MANUAL: 1 %
MONOCYTES # BLD AUTO: ABNORMAL K/UL
MONOCYTES NFR BLD: 12 %
NEUTROPHILS NFR BLD: 72 %
NEUTS BAND NFR BLD MANUAL: 2 %
PHOSPHATE SERPL-MCNC: 3.2 MG/DL
PLATELET # BLD AUTO: 108 K/UL
PLATELET BLD QL SMEAR: ABNORMAL
PMV BLD AUTO: ABNORMAL FL
POTASSIUM SERPL-SCNC: 5.3 MMOL/L
RBC # BLD AUTO: 5.27 M/UL
SATURATED IRON: 33 %
SODIUM SERPL-SCNC: 139 MMOL/L
TACROLIMUS BLD-MCNC: 3.9 NG/ML
TOTAL IRON BINDING CAPACITY: 306 UG/DL
TRANSFERRIN SERPL-MCNC: 207 MG/DL
WBC # BLD AUTO: 4.44 K/UL

## 2017-01-30 PROCEDURE — 80197 ASSAY OF TACROLIMUS: CPT

## 2017-01-30 PROCEDURE — 80069 RENAL FUNCTION PANEL: CPT

## 2017-01-30 PROCEDURE — 36415 COLL VENOUS BLD VENIPUNCTURE: CPT

## 2017-01-30 PROCEDURE — 82728 ASSAY OF FERRITIN: CPT

## 2017-01-30 PROCEDURE — 83735 ASSAY OF MAGNESIUM: CPT

## 2017-01-30 PROCEDURE — 83540 ASSAY OF IRON: CPT

## 2017-01-30 PROCEDURE — 85027 COMPLETE CBC AUTOMATED: CPT

## 2017-01-30 PROCEDURE — 85007 BL SMEAR W/DIFF WBC COUNT: CPT

## 2017-01-30 RX ORDER — TACROLIMUS 1 MG/1
CAPSULE ORAL
Qty: 60 CAPSULE | Refills: 11 | Status: SHIPPED | OUTPATIENT
Start: 2017-01-30 | End: 2017-02-01 | Stop reason: SDUPTHER

## 2017-01-30 RX ORDER — ATORVASTATIN CALCIUM 10 MG/1
10 TABLET, FILM COATED ORAL DAILY
Qty: 30 TABLET | Refills: 6 | Status: SHIPPED | OUTPATIENT
Start: 2017-01-30 | End: 2017-09-18 | Stop reason: SDUPTHER

## 2017-01-30 RX ORDER — DM/P-EPHED/ACETAMINOPH/DOXYLAM 30-7.5/3
LIQUID (ML) ORAL
Qty: 30 LOZENGE | Refills: 3 | Status: SHIPPED | OUTPATIENT
Start: 2017-01-30 | End: 2017-05-08 | Stop reason: SDUPTHER

## 2017-01-30 NOTE — TELEPHONE ENCOUNTER
Pt contacted and advised of MD's lab review and instructions.  Pt. repeated instructions and verbalized understanding.   Patient will take 1 mg of Tacro right now.   Repeat Tacro on Wed, 2/2/17. Patient requesting rx refill for Atorvastatin. Refill request sent.  Lipid Panel scheduled for 2/6 per protocol.

## 2017-01-30 NOTE — TELEPHONE ENCOUNTER
Patient contacted, discussed Tacro dose, patient denied missing any doses of Tacro, on 1/1, dose adjusted on 1/18/17 after high level.  Patient denies any new meds except taking different insulin Humalog instead of Novolog.  Starting Harvoni on 1/13/17.

## 2017-01-30 NOTE — TELEPHONE ENCOUNTER
----- Message from Sylvie Frias MD sent at 1/30/2017  1:25 PM CST -----  Prograf level low at 3.9 --> increase Prograf from 1 mg BID to 2/1. Have him take an extra 1 mg dose this morning. Repeat labs on Wednesday

## 2017-01-30 NOTE — PROGRESS NOTES
Prograf level low at 3.9 --> increase Prograf from 1 mg BID to 2/1. Have him take an extra 1 mg dose this morning. Repeat labs on Wednesday

## 2017-02-01 ENCOUNTER — LAB VISIT (OUTPATIENT)
Dept: LAB | Facility: HOSPITAL | Age: 62
End: 2017-02-01
Attending: INTERNAL MEDICINE
Payer: COMMERCIAL

## 2017-02-01 ENCOUNTER — TELEPHONE (OUTPATIENT)
Dept: PHARMACY | Facility: CLINIC | Age: 62
End: 2017-02-01

## 2017-02-01 DIAGNOSIS — Z94.0 KIDNEY REPLACED BY TRANSPLANT: ICD-10-CM

## 2017-02-01 LAB — TACROLIMUS BLD-MCNC: 6.2 NG/ML

## 2017-02-01 PROCEDURE — 80197 ASSAY OF TACROLIMUS: CPT

## 2017-02-01 PROCEDURE — 36415 COLL VENOUS BLD VENIPUNCTURE: CPT

## 2017-02-01 RX ORDER — TACROLIMUS 1 MG/1
2 CAPSULE ORAL EVERY 12 HOURS
Qty: 120 CAPSULE | Refills: 11 | Status: SHIPPED | OUTPATIENT
Start: 2017-02-01 | End: 2017-02-03 | Stop reason: SDUPTHER

## 2017-02-01 NOTE — PROGRESS NOTES
Prograf level still lower than goal --> if true 12 hour trough or <12 hour level then increase Prograf from 2/1 to 2 mg BID and repeat labs on Friday

## 2017-02-01 NOTE — TELEPHONE ENCOUNTER
Pt contacted and advised of MD's lab review and instructions.  Pt. repeated instructions and verbalized understanding.   Patient will repeat labs on Friday, 2/3

## 2017-02-01 NOTE — TELEPHONE ENCOUNTER
----- Message from Sylvie Frias MD sent at 2/1/2017  1:12 PM CST -----  Prograf level still lower than goal --> if true 12 hour trough or <12 hour level then increase Prograf from 2/1 to 2 mg BID and repeat labs on Friday

## 2017-02-02 NOTE — TELEPHONE ENCOUNTER
Familia f/u complete. Name/ confirmed. Medication list reviewed. Consultation included: indication; goals of treatment; administration; storage and handling; side effects; how to handle side effects; the importance of compliance; how to handle missed doses; the importance of laboratory monitoring; the importance of keeping all follow up appointments. Patient understands to report any medication changes to [facility] and provider. All questions answered and addressed to patients satisfaction. Patient feels well. He reports NO side effects. he endorses strict compliance. he states he is no longer taking sodium bicarbonate and is taking at same time as ranitidine. Al questions answered and addressed to alma satisfaction.

## 2017-02-03 ENCOUNTER — LAB VISIT (OUTPATIENT)
Dept: LAB | Facility: HOSPITAL | Age: 62
End: 2017-02-03
Attending: INTERNAL MEDICINE
Payer: COMMERCIAL

## 2017-02-03 DIAGNOSIS — Z94.0 KIDNEY REPLACED BY TRANSPLANT: ICD-10-CM

## 2017-02-03 LAB — TACROLIMUS BLD-MCNC: 4.4 NG/ML

## 2017-02-03 PROCEDURE — 36415 COLL VENOUS BLD VENIPUNCTURE: CPT

## 2017-02-03 PROCEDURE — 80197 ASSAY OF TACROLIMUS: CPT

## 2017-02-03 RX ORDER — TACROLIMUS 1 MG/1
3 CAPSULE ORAL EVERY 12 HOURS
Qty: 180 CAPSULE | Refills: 11 | Status: SHIPPED | OUTPATIENT
Start: 2017-02-03 | End: 2017-03-09 | Stop reason: DRUGHIGH

## 2017-02-03 RX ORDER — TACROLIMUS 1 MG/1
2 CAPSULE ORAL EVERY 12 HOURS
Qty: 120 CAPSULE | Refills: 11 | Status: SHIPPED | OUTPATIENT
Start: 2017-02-03 | End: 2017-02-03 | Stop reason: SDUPTHER

## 2017-02-03 NOTE — PROGRESS NOTES
Prograf level actually decreased despite increasing dose --> if true 12 hour trough or <12 hour level then increase Prograf from 2 to 3 mg BID and repeat labs on Sunday. Have him take an extra 1 mg dose this afternoon

## 2017-02-03 NOTE — TELEPHONE ENCOUNTER
----- Message from Sylvie Frias MD sent at 2/3/2017  2:52 PM CST -----  Prograf level actually decreased despite increasing dose --> if true 12 hour trough or <12 hour level then increase Prograf from 2 to 3 mg BID and repeat labs on Sunday. Have him take an extra 1 mg dose this afternoon

## 2017-02-03 NOTE — TELEPHONE ENCOUNTER
Pt contacted and advised of MD's lab review and instructions.  Pt. repeated instructions and verbalized understanding.     Reiterated 12 hour trough.

## 2017-02-05 ENCOUNTER — LAB VISIT (OUTPATIENT)
Dept: LAB | Facility: HOSPITAL | Age: 62
End: 2017-02-05
Attending: INTERNAL MEDICINE
Payer: COMMERCIAL

## 2017-02-05 DIAGNOSIS — Z94.0 TRANSPLANTED KIDNEY: Primary | ICD-10-CM

## 2017-02-05 DIAGNOSIS — Z94.0 KIDNEY REPLACED BY TRANSPLANT: ICD-10-CM

## 2017-02-05 LAB
25(OH)D3+25(OH)D2 SERPL-MCNC: 39 NG/ML
ALBUMIN SERPL BCP-MCNC: 3.6 G/DL
ALBUMIN SERPL BCP-MCNC: 3.6 G/DL
ALP SERPL-CCNC: 52 U/L
ALT SERPL W/O P-5'-P-CCNC: 11 U/L
ANION GAP SERPL CALC-SCNC: 6 MMOL/L
ANISOCYTOSIS BLD QL SMEAR: SLIGHT
AST SERPL-CCNC: 18 U/L
BASOPHILS # BLD AUTO: ABNORMAL K/UL
BASOPHILS NFR BLD: 0 %
BILIRUB DIRECT SERPL-MCNC: 0.2 MG/DL
BILIRUB SERPL-MCNC: 0.5 MG/DL
BUN SERPL-MCNC: 19 MG/DL
CALCIUM SERPL-MCNC: 9.8 MG/DL
CHLORIDE SERPL-SCNC: 105 MMOL/L
CHOLEST/HDLC SERPL: 4.4 {RATIO}
CO2 SERPL-SCNC: 27 MMOL/L
CREAT SERPL-MCNC: 1.3 MG/DL
DIFFERENTIAL METHOD: ABNORMAL
EOSINOPHIL # BLD AUTO: ABNORMAL K/UL
EOSINOPHIL NFR BLD: 0 %
ERYTHROCYTE [DISTWIDTH] IN BLOOD BY AUTOMATED COUNT: 15.6 %
EST. GFR  (AFRICAN AMERICAN): >60 ML/MIN/1.73 M^2
EST. GFR  (NON AFRICAN AMERICAN): 59.7 ML/MIN/1.73 M^2
GLUCOSE SERPL-MCNC: 198 MG/DL
HCT VFR BLD AUTO: 39.5 %
HDL/CHOLESTEROL RATIO: 22.5 %
HDLC SERPL-MCNC: 173 MG/DL
HDLC SERPL-MCNC: 39 MG/DL
HGB BLD-MCNC: 12.5 G/DL
LDLC SERPL CALC-MCNC: 117.2 MG/DL
LYMPHOCYTES # BLD AUTO: ABNORMAL K/UL
LYMPHOCYTES NFR BLD: 12 %
MAGNESIUM SERPL-MCNC: 1.3 MG/DL
MCH RBC QN AUTO: 24.7 PG
MCHC RBC AUTO-ENTMCNC: 31.6 %
MCV RBC AUTO: 78 FL
METAMYELOCYTES NFR BLD MANUAL: 1 %
MONOCYTES # BLD AUTO: ABNORMAL K/UL
MONOCYTES NFR BLD: 2 %
NEUTROPHILS # BLD AUTO: ABNORMAL K/UL
NEUTROPHILS NFR BLD: 81 %
NEUTS BAND NFR BLD MANUAL: 4 %
NONHDLC SERPL-MCNC: 134 MG/DL
PHOSPHATE SERPL-MCNC: 2.7 MG/DL
PLATELET # BLD AUTO: 100 K/UL
PLATELET BLD QL SMEAR: ABNORMAL
PMV BLD AUTO: ABNORMAL FL
POTASSIUM SERPL-SCNC: 5.5 MMOL/L
PROT SERPL-MCNC: 6.8 G/DL
PTH-INTACT SERPL-MCNC: 156 PG/ML
RBC # BLD AUTO: 5.07 M/UL
SODIUM SERPL-SCNC: 138 MMOL/L
TACROLIMUS BLD-MCNC: 8.5 NG/ML
TRIGL SERPL-MCNC: 84 MG/DL
URATE SERPL-MCNC: 5.3 MG/DL
WBC # BLD AUTO: 3.96 K/UL

## 2017-02-05 PROCEDURE — 82306 VITAMIN D 25 HYDROXY: CPT

## 2017-02-05 PROCEDURE — 84550 ASSAY OF BLOOD/URIC ACID: CPT

## 2017-02-05 PROCEDURE — 36415 COLL VENOUS BLD VENIPUNCTURE: CPT

## 2017-02-05 PROCEDURE — 80197 ASSAY OF TACROLIMUS: CPT

## 2017-02-05 PROCEDURE — 85007 BL SMEAR W/DIFF WBC COUNT: CPT

## 2017-02-05 PROCEDURE — 87799 DETECT AGENT NOS DNA QUANT: CPT

## 2017-02-05 PROCEDURE — 83970 ASSAY OF PARATHORMONE: CPT

## 2017-02-05 PROCEDURE — 80069 RENAL FUNCTION PANEL: CPT

## 2017-02-05 PROCEDURE — 83735 ASSAY OF MAGNESIUM: CPT

## 2017-02-05 PROCEDURE — 80061 LIPID PANEL: CPT

## 2017-02-05 PROCEDURE — 85027 COMPLETE CBC AUTOMATED: CPT

## 2017-02-05 PROCEDURE — 84075 ASSAY ALKALINE PHOSPHATASE: CPT

## 2017-02-05 RX ORDER — VIT C/E/ZN/COPPR/LUTEIN/ZEAXAN 250MG-90MG
1000 CAPSULE ORAL DAILY
Refills: 0 | COMMUNITY
Start: 2017-02-05 | End: 2019-08-01

## 2017-02-05 NOTE — PROGRESS NOTES
Prograf at goal. Cr stable. Potassium on higher end -->  on low potassium diet. Vitamin D level normal --> discontinue ergocalciferol 50,000 units weekly and start cholecalciferol 1000 units daily.

## 2017-02-06 ENCOUNTER — TELEPHONE (OUTPATIENT)
Dept: TRANSPLANT | Facility: CLINIC | Age: 62
End: 2017-02-06

## 2017-02-06 ENCOUNTER — CLINICAL SUPPORT (OUTPATIENT)
Dept: SMOKING CESSATION | Facility: CLINIC | Age: 62
End: 2017-02-06
Payer: COMMERCIAL

## 2017-02-06 DIAGNOSIS — F17.200 NICOTINE DEPENDENCE: Primary | ICD-10-CM

## 2017-02-06 PROCEDURE — 99999 PR PBB SHADOW E&M-EST. PATIENT-LVL I: CPT | Mod: PBBFAC,,,

## 2017-02-06 PROCEDURE — 90832 PSYTX W PT 30 MINUTES: CPT | Mod: S$GLB,,,

## 2017-02-06 RX ORDER — NICOTINE 7MG/24HR
1 PATCH, TRANSDERMAL 24 HOURS TRANSDERMAL DAILY
Qty: 28 PATCH | Refills: 1 | Status: SHIPPED | OUTPATIENT
Start: 2017-02-06 | End: 2017-04-05 | Stop reason: SDUPTHER

## 2017-02-06 NOTE — TELEPHONE ENCOUNTER
----- Message from Sylvie Frias MD sent at 2/5/2017  1:02 PM CST -----  UA with hyaline casts --> encourage hydration. Continue to monitor UPC

## 2017-02-06 NOTE — TELEPHONE ENCOUNTER
Pt contacted and advised of MD's lab review and instructions.  Pt. repeated instructions and verbalized understanding. Patient encouraged to increase water.  He verbalized understanding, will repeat urinalysis on Wed.

## 2017-02-06 NOTE — PROGRESS NOTES
Individual Follow-Up Form    2/6/2017    Quit Date: 12/9/16    Clinical Status of Patient: Outpatient    Length of Service: 30mins    Continuing Medication: Yes    Other Medications: N/A     Target Symptoms: Withdrawal and medication side effects. The following were  rated moderate (3) to severe (4) on TCRS:  · Moderate (3): Increased Appetite   · Severe (4): N/A    Comments: Pt has not smoked since December 9th, down from half a pack a day, denies any urges or cravings, despite the fact his wife continues to smoke around him, only side effect is increased appetite, which Pt is actually pleased about, Pt has wanted to gain weight, no complaints about generic nicotine patches, find they stick very well, LPC praised Pt for progress made thus far.    Diagnosis: F17.200    Next Visit: 3/8/17

## 2017-02-07 ENCOUNTER — TELEPHONE (OUTPATIENT)
Dept: TRANSPLANT | Facility: CLINIC | Age: 62
End: 2017-02-07

## 2017-02-07 LAB
BK VIRUS DNA PCR, QUANT, BLOOD: <250 COPIES/ML
BK VIRUS DNA, BLOOD: NOT DETECTED
LOG BKV COPIES/ML: <2.4 LOG (10) COPIES/ML

## 2017-02-07 NOTE — TELEPHONE ENCOUNTER
----- Message from Radha Hopper sent at 2/7/2017  3:47 PM CST -----  Contact: pt   Please call, [phone]
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
## 2017-02-08 ENCOUNTER — EPISODE CHANGES (OUTPATIENT)
Dept: HEPATOLOGY | Facility: CLINIC | Age: 62
End: 2017-02-08

## 2017-02-08 ENCOUNTER — LAB VISIT (OUTPATIENT)
Dept: LAB | Facility: HOSPITAL | Age: 62
End: 2017-02-08
Attending: INTERNAL MEDICINE
Payer: COMMERCIAL

## 2017-02-08 DIAGNOSIS — B18.2 CHRONIC HEPATITIS C WITHOUT HEPATIC COMA: ICD-10-CM

## 2017-02-08 DIAGNOSIS — Z94.0 KIDNEY REPLACED BY TRANSPLANT: ICD-10-CM

## 2017-02-08 LAB
ALBUMIN SERPL BCP-MCNC: 3.6 G/DL
ALP SERPL-CCNC: 50 U/L
ALT SERPL W/O P-5'-P-CCNC: 9 U/L
ANION GAP SERPL CALC-SCNC: 8 MMOL/L
AST SERPL-CCNC: 18 U/L
BILIRUB SERPL-MCNC: 0.5 MG/DL
BILIRUB UR QL STRIP: NEGATIVE
BUN SERPL-MCNC: 17 MG/DL
CALCIUM SERPL-MCNC: 9.5 MG/DL
CHLORIDE SERPL-SCNC: 105 MMOL/L
CLARITY UR REFRACT.AUTO: CLEAR
CO2 SERPL-SCNC: 26 MMOL/L
COLOR UR AUTO: YELLOW
CREAT SERPL-MCNC: 1.3 MG/DL
CREAT UR-MCNC: 43 MG/DL
EST. GFR  (AFRICAN AMERICAN): >60 ML/MIN/1.73 M^2
EST. GFR  (NON AFRICAN AMERICAN): 59.7 ML/MIN/1.73 M^2
GLUCOSE SERPL-MCNC: 149 MG/DL
GLUCOSE UR QL STRIP: NEGATIVE
HGB UR QL STRIP: NEGATIVE
KETONES UR QL STRIP: NEGATIVE
LEUKOCYTE ESTERASE UR QL STRIP: NEGATIVE
NITRITE UR QL STRIP: NEGATIVE
PH UR STRIP: 7 [PH] (ref 5–8)
POTASSIUM SERPL-SCNC: 4.5 MMOL/L
PROT SERPL-MCNC: 6.8 G/DL
PROT UR QL STRIP: NEGATIVE
PROT UR-MCNC: <7 MG/DL
PROT/CREAT RATIO, UR: NORMAL
SODIUM SERPL-SCNC: 139 MMOL/L
SP GR UR STRIP: 1.01 (ref 1–1.03)
URN SPEC COLLECT METH UR: NORMAL
UROBILINOGEN UR STRIP-ACNC: NEGATIVE EU/DL

## 2017-02-08 PROCEDURE — 81003 URINALYSIS AUTO W/O SCOPE: CPT

## 2017-02-08 PROCEDURE — 82570 ASSAY OF URINE CREATININE: CPT

## 2017-02-08 PROCEDURE — 80053 COMPREHEN METABOLIC PANEL: CPT

## 2017-02-08 PROCEDURE — 87522 HEPATITIS C REVRS TRNSCRPJ: CPT

## 2017-02-09 ENCOUNTER — TELEPHONE (OUTPATIENT)
Dept: HEPATOLOGY | Facility: CLINIC | Age: 62
End: 2017-02-09

## 2017-02-09 LAB
HCV LOG: <1.08 LOG (10) IU/ML
HCV RNA QUANT PCR: <12 IU/ML
HCV, QUALITATIVE: NOT DETECTED IU/ML

## 2017-02-09 NOTE — TELEPHONE ENCOUNTER
HCV LAB REVIEW  Week 4 of Harvoni, planning on 12 weeks treatment  Post kidney transplant  Nicole 1a, tx naive, F0-1    Pertinent labs:  2/8/17  CMP stable  HCV neg      pls call pt:  Labs look good. Liver enzymes normal. HCV is now negative  - Continue Harvoni - 1 pill daily - don't miss any doses. Take this WITH famotidine 20mg at 8pm.  - remind him to separate the sodium bicarb from the harvoni. Sodium bicarb at 8am, 2pm   TWO months left of treatment!    Any side effects or problems?    Next labs due   - CMP at week 8 - 3/8  - CMP, HCV RNA at week 12 - end of treatment - 4/5

## 2017-02-10 NOTE — TELEPHONE ENCOUNTER
Spoke to pt. Given message from provider. Verbalized understanding. Pt denies any problems or side effects.  Reminded of upcoming appts.

## 2017-02-13 ENCOUNTER — OFFICE VISIT (OUTPATIENT)
Dept: TRANSPLANT | Facility: CLINIC | Age: 62
End: 2017-02-13
Payer: COMMERCIAL

## 2017-02-13 VITALS
WEIGHT: 194.44 LBS | SYSTOLIC BLOOD PRESSURE: 107 MMHG | RESPIRATION RATE: 16 BRPM | BODY MASS INDEX: 25.77 KG/M2 | HEIGHT: 73 IN | OXYGEN SATURATION: 100 % | HEART RATE: 100 BPM | TEMPERATURE: 98 F | DIASTOLIC BLOOD PRESSURE: 79 MMHG

## 2017-02-13 DIAGNOSIS — Z29.89 PROPHYLACTIC IMMUNOTHERAPY: ICD-10-CM

## 2017-02-13 DIAGNOSIS — E13.22 SECONDARY DM WITH HYPERTENSION AND ESRD ON DIALYSIS: Chronic | ICD-10-CM

## 2017-02-13 DIAGNOSIS — Z94.0 S/P KIDNEY TRANSPLANT: ICD-10-CM

## 2017-02-13 DIAGNOSIS — Z79.60 LONG-TERM USE OF IMMUNOSUPPRESSANT MEDICATION: ICD-10-CM

## 2017-02-13 DIAGNOSIS — E83.39 HYPOPHOSPHATEMIA: ICD-10-CM

## 2017-02-13 DIAGNOSIS — Z99.2 SECONDARY DM WITH HYPERTENSION AND ESRD ON DIALYSIS: Chronic | ICD-10-CM

## 2017-02-13 DIAGNOSIS — E78.5 HYPERLIPIDEMIA, UNSPECIFIED HYPERLIPIDEMIA TYPE: ICD-10-CM

## 2017-02-13 DIAGNOSIS — N18.6 SECONDARY DM WITH HYPERTENSION AND ESRD ON DIALYSIS: Chronic | ICD-10-CM

## 2017-02-13 DIAGNOSIS — E11.42 DIABETIC POLYNEUROPATHY ASSOCIATED WITH TYPE 2 DIABETES MELLITUS: ICD-10-CM

## 2017-02-13 DIAGNOSIS — E11.65 UNCONTROLLED TYPE 2 DIABETES MELLITUS WITH DIABETIC NEPHROPATHY, UNSPECIFIED LONG TERM INSULIN USE STATUS: ICD-10-CM

## 2017-02-13 DIAGNOSIS — I10 ESSENTIAL HYPERTENSION: ICD-10-CM

## 2017-02-13 DIAGNOSIS — D63.8 ANEMIA OF CHRONIC DISEASE: ICD-10-CM

## 2017-02-13 DIAGNOSIS — Z79.899 IMMUNOSUPPRESSIVE MANAGEMENT ENCOUNTER FOLLOWING KIDNEY TRANSPLANT: ICD-10-CM

## 2017-02-13 DIAGNOSIS — N18.2 CKD (CHRONIC KIDNEY DISEASE) STAGE 2, GFR 60-89 ML/MIN: Primary | Chronic | ICD-10-CM

## 2017-02-13 DIAGNOSIS — I12.0 SECONDARY DM WITH HYPERTENSION AND ESRD ON DIALYSIS: Chronic | ICD-10-CM

## 2017-02-13 DIAGNOSIS — E11.21 UNCONTROLLED TYPE 2 DIABETES MELLITUS WITH DIABETIC NEPHROPATHY, UNSPECIFIED LONG TERM INSULIN USE STATUS: ICD-10-CM

## 2017-02-13 DIAGNOSIS — Z94.0 IMMUNOSUPPRESSIVE MANAGEMENT ENCOUNTER FOLLOWING KIDNEY TRANSPLANT: ICD-10-CM

## 2017-02-13 PROCEDURE — 3045F PR MOST RECENT HEMOGLOBIN A1C LEVEL 7.0-9.0%: CPT | Mod: S$GLB,,, | Performed by: INTERNAL MEDICINE

## 2017-02-13 PROCEDURE — 99999 PR PBB SHADOW E&M-EST. PATIENT-LVL III: CPT | Mod: PBBFAC,,, | Performed by: INTERNAL MEDICINE

## 2017-02-13 PROCEDURE — 2022F DILAT RTA XM EVC RTNOPTHY: CPT | Mod: S$GLB,,, | Performed by: INTERNAL MEDICINE

## 2017-02-13 PROCEDURE — 97802 MEDICAL NUTRITION INDIV IN: CPT | Mod: S$GLB,,, | Performed by: DIETITIAN, REGISTERED

## 2017-02-13 PROCEDURE — 99215 OFFICE O/P EST HI 40 MIN: CPT | Mod: S$GLB,,, | Performed by: INTERNAL MEDICINE

## 2017-02-13 PROCEDURE — 3060F POS MICROALBUMINURIA REV: CPT | Mod: S$GLB,,, | Performed by: INTERNAL MEDICINE

## 2017-02-13 NOTE — PATIENT INSTRUCTIONS
1. Don't take the nifedipine unless your morning blood pressure is >140  2. Try to exercise   3. Eat a low potassium, higher phos, higher magnesium diet --> dietician to give you some info

## 2017-02-13 NOTE — LETTER
February 13, 2017        Alexi Glasgow  [address]  [address]  [facility] [address]  Phone: [phone]  Fax: [phone]             [facility]  [address]  [facility] [address]  Phone: [phone]   Patient: Ravi Zamorano   MR Number: [id number]   YOB: 1955   Date of Visit: 2/13/2017       Dear Dr. Alexi Glasgow    Thank you for referring Ravi Zamorano to me for evaluation. Attached you will find relevant portions of my assessment and plan of care.    If you have questions, please do not hesitate to call me. I look forward to following Ravi Zamorano along with you.    Sincerely,    Sylvie Frias MD    Enclosure    If you would like to receive this communication electronically, please contact [email or web address] or [phone] to request [employer] access.    [employer] is a tool which provides read-only access to select patient information with whom you have a relationship. Its easy to use and provides real time access to review your patients record including encounter summaries, notes, results, and demographic information.    If you feel you have received this communication in error or would no longer like to receive these types of communications, please e-mail [email or web address]

## 2017-02-13 NOTE — PROGRESS NOTES
Kidney Post-Transplant Assessment    Referring Physician: Alexi Glasgow  Current Nephrologist: Alexi Glasgow    ORGAN: RIGHT KIDNEY  Donor Type:  - brain death  PHS Increased Risk: yes  Cold Ischemia: 314 mins  Induction Medications: thymoglobulin    Subjective:     CC:  Reassessment of renal allograft function and management of chronic immunosuppression.    Kidney History:  Mr. Zamorano is a 61 y.o. year old Black or  male with history of ESRD presumed secondary to DM/HTN who was on RRT since 5/6/15 until receiving PHS increased risk DDRT (THYMO induction given HCV positive donor and recipient, KDPI 85%, WIT 27 minutes, CIT 5 hours and 14 minutes, donor RPR positive thus recipient received PCN x3 doses; CMV D+/R+) on 16. He started Harvoni on 17. He has CKD stage 2 - GFR 60-89 and his baseline creatinine is between 1.2 to 1.5. He takes mycophenolate mofetil, prednisone and tacrolimus for maintenance immunosuppression.     Interval History: Patient last seen in transplant clinic on 17. Since last visit he denies any hospitalizations or ER visits. He doesn't really exercise but states he would like to start doing sit ups and lifting weights. He checks his BPs at home but forgot to bring the log - states the values are in the 120s-150s/80s. He has been taking nifedipine 30 mg even when his SBP is <140.     Review of Systems  Constitutional: Negative for fever, appetite change and fatigue.   HENT: Negative for hearing loss, sore throat and mouth sores.   Eyes: Negative for photophobia, pain and visual disturbance.   Respiratory: Negative for cough, chest tightness, shortness of breath and wheezing.   Cardiovascular: Negative for chest pain, palpitations and leg swelling.   Gastrointestinal: +incisional burning at night for a few seconds; Negative for nausea, vomiting, abdominal pain, diarrhea, constipation, blood in stool and abdominal distention.   Genitourinary:  Negative for dysuria, urgency, frequency, hematuria, decreased urine volume, difficulty urinating  Musculoskeletal: Negative for back pain, joint swelling, arthralgias and gait problem.   Skin: Negative for pallor, rash and wound.   Neurological: +mild tremors; Negative for dizziness, syncope, weakness, light-headedness and headaches.   Hematological: Negative for adenopathy. Does not bruise/bleed easily.   Psychiatric/Behavioral: Negative for confusion, sleep disturbance and dysphoric mood. The patient is not nervous/anxious.     Medications:   Current Outpatient Prescriptions   Medication Sig Dispense Refill    aspirin 81 MG Chew Take 81 mg by mouth once daily.      atorvastatin (LIPITOR) 10 MG tablet Take 1 tablet (10 mg total) by mouth once daily. 30 tablet 6    blood sugar diagnostic Strp 1 strip by Misc.(Non-Drug; Combo Route) route 4 (four) times daily before meals and nightly. e11.65 100 strip 5    blood-glucose meter (FREESTYLE SYSTEM KIT) kit Use as instructed e11.65 may substitute meter as covered by insurance 1 each 0    carvedilol (COREG) 12.5 MG tablet Take 12.5 mg by mouth 2 (two) times daily.  0    cholecalciferol, vitamin D3, 1,000 unit capsule Take 1 capsule (1,000 Units total) by mouth once daily.  0    docusate sodium (COLACE) 100 MG capsule Take 1 capsule (100 mg total) by mouth 2 (two) times daily as needed for Constipation. 60 capsule 3    famotidine (PEPCID) 20 MG tablet Take 1 tablet (20 mg total) by mouth every evening. 30 tablet 11    HARVONI  mg Tab Take 1 tablet by mouth once daily. Take at same time as Famotidine 20mg 28 tablet 2    insulin detemir (LEVEMIR FLEXTOUCH) 100 unit/mL (3 mL) SubQ InPn pen Inject 28 Units into the skin once daily. (Patient taking differently: Inject 32 Units into the skin once daily. ) 1 Box 4    insulin lispro (HUMALOG KWIKPEN) 100 unit/mL InPn pen Inject 16 units w/ meals plus scale as directed. 4 units w/ bedtime snack. 2 Box 11     "lancets Misc 1 each by Misc.(Non-Drug; Combo Route) route 4 (four) times daily before meals and nightly. e11.65 100 each 0    linagliptin (TRADJENTA) 5 mg Tab tablet Take 1 tablet (5 mg total) by mouth once daily. 30 tablet 11    magnesium oxide (MAG-OX) 400 mg tablet Take 1 tablet (400 mg total) by mouth 2 (two) times daily. 60 tablet 11    multivitamin (THERAGRAN) tablet Take 1 tablet by mouth once daily.      mycophenolate (CELLCEPT) 250 mg Cap Take 4 capsules (1,000 mg total) by mouth 2 (two) times daily. 240 capsule 11    nicotine (NICODERM CQ) 7 mg/24 hr Place 1 patch onto the skin once daily. NAME BRAND ONLY 28 patch 1    nicotine polacrilex 2 MG Lozg TAKE 1 LOZENGE BY MOUTH AS NEEDED. (6-16 LOZENGES AS NEEDED) 30 lozenge 3    nifedipine 30 MG ORAL TR24 (PROCARDIA-XL) 30 MG (OSM) 24 hr tablet Take 1 tablet (30 mg total) by mouth once daily. 30 tablet 11    ONETOUCH ULTRA TEST Strp   0    pen needle, diabetic 32 gauge x 5/32" Ndle 1 each by Misc.(Non-Drug; Combo Route) route 4 (four) times daily. 100 each 4    predniSONE (DELTASONE) 10 MG tablet Take 20 mg PO QD 11/8-12/7; 15 mg PO QD 12/8-1/7; 10 mg PO QD 1/8-2/7; then 5 mg PO QD thereafter 60 tablet 11    ropinirole (REQUIP) 0.5 MG tablet Take 0.5 mg by mouth once daily.      sulfamethoxazole-trimethoprim 400-80mg (BACTRIM,SEPTRA) 400-80 mg per tablet Take 1 tablet by mouth once daily. Stop: 11/5/17 30 tablet 11    tacrolimus (PROGRAF) 1 MG Cap Take 3 capsules (3 mg total) by mouth every 12 (twelve) hours. Z94.0 kidney transplant 11/5/2016 180 capsule 11     No current facility-administered medications for this visit.          Objective:     Blood pressure 107/79, pulse 100, temperature 97.7 °F (36.5 °C), temperature source Oral, resp. rate 16, height 6' 1" (1.854 m), weight 88.2 kg (194 lb 7.1 oz), SpO2 100 %.body mass index is 25.65 kg/(m^2).    Physical Exam  General: No acute distress, well groomed, alert and oriented x 3  HEENT: " Normocephalic, atraumatic, EOM's intact bilaterally, external inspection of ears and nose normal, moist mucous membranes, no oral ulcerations/lesions  Neck: Supple, symmetrical, trachea midline, no thyromegaly, no JVD  Respiratory: Clear to auscultation bilaterally, respirations unlabored, no rales/rhonchi/wheezing  Cardiovacular: Regular rate and rhythm, S1, S2 normal, no murmurs, rubs or gallops  Gastrointestinal: Soft, non-tender, bowel sounds normal, no hepatosplenomegaly  Renal allograft exam: No tenderness, no bruits, normal exam  Musculoskeletal: No knee or ankle joint tenderness or swelling.   Extremities: No clubbing or cyanosis of bilateral upper extremities; no lower extremity edema bilaterally, radial pulses 2+ bilaterally, symmetric  Skin: warm and dry; no rash on exposed skin  Neurologic: CN grossly intact    Labs:  Lab Results   Component Value Date    WBC 3.96 02/05/2017    HGB 12.5 (L) 02/05/2017    HCT 39.5 (L) 02/05/2017     02/08/2017    K 4.5 02/08/2017     02/08/2017    CO2 26 02/08/2017    BUN 17 02/08/2017    CREATININE 1.3 02/08/2017    EGFRNONAA 59.7 (A) 02/08/2017    CALCIUM 9.5 02/08/2017    PHOS 2.7 02/05/2017    MG 1.3 (L) 02/05/2017    ALBUMIN 3.6 02/08/2017    AST 18 02/08/2017    ALT 9 (L) 02/08/2017    UTPCR Unable to calculate 02/08/2017    .0 (H) 02/05/2017    TACROLIMUS 8.5 02/05/2017       No results found for: EXTANC, EXTWBC, EXTSEGS, EXTPLATELETS, EXTHEMOGLOBI, EXTHEMATOCRI, EXTCREATININ, EXTSODIUM, EXTPOTASSIUM, EXTBUN, EXTCO2, EXTCALCIUM, EXTPHOSPHORU, EXTGLUCOSE, EXTALBUMIN, EXTAST, EXTALT, EXTBILITOTAL, EXTLIPASE, EXTAMYLASE    No results found for: EXTCYCLOSLVL, EXTSIROLIMUS, EXTTACROLVL, EXTPROTCRE, EXTPTHINTACT, EXTPROTEINUA, EXTWBCUA, EXTRBCUA    Labs were reviewed with the patient.    Assessment/Plan:     1. CKD (chronic kidney disease) stage 2, GFR 60-89 ml/min    2. S/P cadaveric kidney transplant 11/5/2016. ESRD secondary to HTN/DMII    3.  Secondary DM with hypertension and ESRD on dialysis    4. Diabetic polyneuropathy associated with type 2 diabetes mellitus    5. Anemia of chronic disease    6. Uncontrolled type 2 diabetes mellitus with diabetic nephropathy, unspecified long term insulin use status    7. Hyperlipidemia, unspecified hyperlipidemia type    8. Essential hypertension    9. Long-term use of immunosuppressant medication    10. Prophylactic immunotherapy    11. Immunosuppressive management encounter following kidney transplant        Mr. Zamorano is a 61 y.o. male with:     # History of ESRD presumed secondary to DM/HTN who was on RRT since 5/6/15 until receiving PHS increased risk DDRT (THYMO induction given HCV positive donor and recipient, KDPI 85%, WIT 27 minutes, CIT 5 hours and 14 minutes, donor RPR positive thus recipient received PCN x3 doses; CMV D+/R+) on 11/5/16: baseline Cr 1.2 to 1.5  - last Cr within baseline at 1.3 from 2/8/17  - last UPC negative from 2/8/17    # Immunosuppression:   - continue Prograf 3 mg BID, last FK-506 level 8.5 from 2/5/17  - continue MMF 1000 mg BID   - continue prednisone 5 mg daily  - continue to monitor for toxicities from immunosuppressive medications     # Antimicrobial Prophylaxis:   - continue Bactrim for PJP prophylaxis until 11/5/17  - he complete Valcyte for CMV prophylaxis for 3 months given CMV D+/R+ --> would check CMV PCR with next labs     # Infectious Surveillance:   - last BK serum PCR negative from 2/5/17    # HTN: BPs on lower end at times  - continue Coreg 12.5 mg BID   - advised patient to only take nifedipine 30 mg daily if SBP >140  - continue with home blood pressure monitoring  - low salt and healthy life discussed with the patient     # Metabolic Bone Disease/Secondary Hyperparathyroidism: last calcium/phos normal  - will monitor PTH, calcium, and phosphorus as per our center protocol    # Hypomagnesemia: Mg level on lower end at 1.3 but previously acceptable   - continue  MagOx 400 mg BID - if Mg remains on lower end would uptitrate MagOx dose     # DM: blood sugars improved  - counseled patient on importance of optimizing glycemic control   - continue following up with sugar clinic       # Anemia of chronic disease: Hb stable at 12.5  - will continue monitoring as per our center guidelines. No indication for acute intervention today     # Hepatitis C: genotype 1a; last HCV PCR was negative from 2/8/17; started Harvoni on 1/12/17  - continue Harvoni   - continue follow-up with hepatology      # Vitamin D Deficiency: vitamin D level improved from 15 (11/8/16) --> 39 (2/5/17) thus discontinued ergocalciferol and started cholecalciferol 1000 units daily   - continue cholecalciferol 1000 units daily     Follow-up:   Clinic: return to transplant clinic weekly for the first month after transplant; every 2 weeks during months 2-3; then at 6-, 9-, 12-, 18-, 24-, and 36- months post-transplant to reassess for complications from immunosuppression toxicity and monitor for rejection.  Annually thereafter.    Labs: since patient remains at high risk for rejection and drug-related complications that warrant close monitoring, labs will be ordered as follows: continue twice weekly CBC, renal panel, and drug level for first month; then same labs once weekly through 3rd month post-transplant.  Urine for UA and protein/creatinine ratio monthly.  Serum BK - PCR at 1-, 3-, 6-, 9-, 12-, 18-, 24-, and 36- months post-transplant.  Hepatic panel at 1-, 2-, 3-, 6-, 9-, 12-, 18-, 24-, and 36- months post-transplant.    Sylvie Frias MD       Education:   Material provided to the patient.  Patient reminded to call with any health changes since these can be early signs of significant complications.  Also, I advised the patient to be sure any new medications or changes of old medications are discussed with either a pharmacist or physician knowledgeable with transplant to avoid rejection/drug toxicity related to  significant drug interactions.    UNOS Patient Status  Functional Status: 100% - Normal, no complaints, no evidence of disease  Physical Capacity: No Limitations

## 2017-02-13 NOTE — PROGRESS NOTES
REFERRING PROVIDER: Sylvie Frias MD    REASON FOR VIST: Low magnesium level    PAST MEDICAL HX: s/p KTx 11/5/16    LABS: 2/5/17 mag 1.3    NUTRITION HX/INTERVENTION: Pt states he has been limiting intake of high K foods in diet-current K level 4.5 (pt has written material on low K, high phos diet).  Continues to follow diabetic diet and states he logs his gluc levels.  -Magnesium content of food list provided & reviewed w/ pt; encouraged to increase po intake of high magnesium foods and continue to monitor levels.  -Reinforced low K diet guidelines and encouraged pt to continue adhering to same.      Patient voiced understanding of education & goals. Contact information was provided & will f/u as needed at clinic visits.     Consultation Time: 15 minutes

## 2017-02-13 NOTE — MR AVS SNAPSHOT
[facility]  [address]  [facility] [address]  Phone: [phone]                  Ravi Zamorano   2017 1:30 PM   Office Visit    Description:  Male : 1955   Provider:  Sylvie Frias MD   Department:  [facility]           Reason for Visit     Kidney Transplant Reassessment     Chronic Kidney Disease           Diagnoses this Visit        Comments    CKD (chronic kidney disease) stage 2, GFR 60-89 ml/min    -  Primary     S/P kidney transplant         Secondary DM with hypertension and ESRD on dialysis         Diabetic polyneuropathy associated with type 2 diabetes mellitus         Anemia of chronic disease         Uncontrolled type 2 diabetes mellitus with diabetic nephropathy, unspecified long term insulin use status         Hyperlipidemia, unspecified hyperlipidemia type         Essential hypertension         Long-term use of immunosuppressant medication         Prophylactic immunotherapy         Immunosuppressive management encounter following kidney transplant                To Do List           Future Appointments        Provider Department Dept Phone    2017 9:00 AM Makayla Mckeon MD [facility] - Urology 4th Floor [phone]    3/8/2017 10:00 AM LAB, TRANSPLANT [facility] [phone]    2017 10:00 AM LAB, TRANSPLANT [facility] [phone]    2017 7:30 AM REYES Rendon, SALOMEP [facility] - Endocrinology [phone]      Goals (5 Years of Data)     None      [facility] On Call     [facility] On Call Nurse Care Line -  Assistance  Registered nurses in the [facility] On Call Center provide clinical advisement, health education, appointment booking, and other advisory services.  Call for this free service at [phone].             Medications           Message regarding Medications     Verify the changes and/or additions to your medication regime listed below are the same as discussed with your  clinician today.  If any of these changes or additions are incorrect, please notify your healthcare provider.        STOP taking these medications     oxycodone-acetaminophen (PERCOCET) 5-325 mg per tablet Take 1 tablet by mouth every 4 (four) hours as needed for Pain.    valganciclovir (VALCYTE) 450 mg Tab Take 2 tablets (900 mg total) by mouth once daily. Stop: 2/3/17           Verify that the below list of medications is an accurate representation of the medications you are currently taking.  If none reported, the list may be blank. If incorrect, please contact your healthcare provider. Carry this list with you in case of emergency.           Current Medications     aspirin 81 MG Chew Take 81 mg by mouth once daily.    atorvastatin (LIPITOR) 10 MG tablet Take 1 tablet (10 mg total) by mouth once daily.    blood sugar diagnostic Strp 1 strip by Misc.(Non-Drug; Combo Route) route 4 (four) times daily before meals and nightly. e11.65    blood-glucose meter (FREESTYLE SYSTEM KIT) kit Use as instructed e11.65 may substitute meter as covered by insurance    carvedilol (COREG) 12.5 MG tablet Take 12.5 mg by mouth 2 (two) times daily.    cholecalciferol, vitamin D3, 1,000 unit capsule Take 1 capsule (1,000 Units total) by mouth once daily.    docusate sodium (COLACE) 100 MG capsule Take 1 capsule (100 mg total) by mouth 2 (two) times daily as needed for Constipation.    famotidine (PEPCID) 20 MG tablet Take 1 tablet (20 mg total) by mouth every evening.    HARVONI  mg Tab Take 1 tablet by mouth once daily. Take at same time as Famotidine 20mg    insulin detemir (LEVEMIR FLEXTOUCH) 100 unit/mL (3 mL) SubQ InPn pen Inject 28 Units into the skin once daily.    insulin lispro (HUMALOG KWIKPEN) 100 unit/mL InPn pen Inject 16 units w/ meals plus scale as directed. 4 units w/ bedtime snack.    lancets Misc 1 each by Misc.(Non-Drug; Combo Route) route 4 (four) times daily before meals and nightly. e11.65    linagliptin  "(TRADJENTA) 5 mg Tab tablet Take 1 tablet (5 mg total) by mouth once daily.    magnesium oxide (MAG-OX) 400 mg tablet Take 1 tablet (400 mg total) by mouth 2 (two) times daily.    multivitamin (THERAGRAN) tablet Take 1 tablet by mouth once daily.    mycophenolate (CELLCEPT) 250 mg Cap Take 4 capsules (1,000 mg total) by mouth 2 (two) times daily.    nicotine (NICODERM CQ) 7 mg/24 hr Place 1 patch onto the skin once daily. NAME BRAND ONLY    nicotine polacrilex 2 MG Lozg TAKE 1 LOZENGE BY MOUTH AS NEEDED. (6-16 LOZENGES AS NEEDED)    nifedipine 30 MG ORAL TR24 (PROCARDIA-XL) 30 MG (OSM) 24 hr tablet Take 1 tablet (30 mg total) by mouth once daily.    ONETOUCH ULTRA TEST Strp     pen needle, diabetic 32 gauge x 5/32" Ndle 1 each by Misc.(Non-Drug; Combo Route) route 4 (four) times daily.    predniSONE (DELTASONE) 10 MG tablet Take 20 mg PO QD 11/8-12/7; 15 mg PO QD 12/8-1/7; 10 mg PO QD 1/8-2/7; then 5 mg PO QD thereafter    ropinirole (REQUIP) 0.5 MG tablet Take 0.5 mg by mouth once daily.    sulfamethoxazole-trimethoprim 400-80mg (BACTRIM,SEPTRA) 400-80 mg per tablet Take 1 tablet by mouth once daily. Stop: 11/5/17    tacrolimus (PROGRAF) 1 MG Cap Take 3 capsules (3 mg total) by mouth every 12 (twelve) hours. Z94.0 kidney transplant 11/5/2016           Clinical Reference Information           Your Vitals Were     BP Pulse Temp Resp Height Weight    107/79 (BP Location: Right arm, Patient Position: Sitting, BP Method: Automatic) 100 97.7 °F (36.5 °C) (Oral) 16 6' 1" (1.854 m) 88.2 kg (194 lb 7.1 oz)    SpO2 BMI             100% 25.65 kg/m2         Blood Pressure          Most Recent Value    BP  107/79      Allergies as of 2/13/2017     No Known Allergies      Immunizations Administered on Date of Encounter - 2/13/2017     None      Maintenance Dialysis History     Start End Type [facility]    5/6/2015  [facility]            Current D[facility] Center Information     [facility] [address] Phone " #:  [phone]    Contact:  [place]  [address] Fax #:  [phone]            Transplant Information        Txp Date Organ Coordinator Care Team    11/5/2016 Kidney Kena Robles RN Current Nephrologist:  Alexi Glasgow MD   Referring Physician:  Alexi Glasgow MD   Surgeon:  Constantino Gillette MD   Assisting Surgeon:  Shayy Angeles MD         [facility] Sign-Up     Activating your [facility] account is as easy as 1-2-3!     1) Visit [email or web address], select Sign Up Now, enter this activation code and your date of birth, then select Next.  [email or web address]  Expires: 3/30/2017  2:03 PM      2) Create a username and password to use when you visit [facility] in the future and select a security question in case you lose your password and select Next.    3) Enter your e-mail address and click Sign Up!    Additional Information  If you have questions, please e-mail [email or web address] or call [phone] to talk to our [facility] staff. Remember, [facility] is NOT to be used for urgent needs. For medical emergencies, dial 911.         Instructions    1. Don't take the nifedipine unless your morning blood pressure is >140  2. Try to exercise   3. Eat a low potassium, higher phos, higher magnesium diet --> dietician to give you some info        Language Assistance Services     ATTENTION: Language assistance services are available, free of charge. Please call [phone].      ATENCIÓN: Si surekha mccarty, tiene a moreno disposición servicios gratuitos de asistencia lingüística. Llame al [phone].     CHÚ Ý: N?u b?n nói Ti?ng Vi?t, có các d?ch v? h? tr? ngôn ng? mi?n phí dành cho b?n. G?i s? [phone].         [facility] complies with applicable Federal civil rights laws and does not discriminate on the basis of race, color, national origin, age, disability, or sex.

## 2017-02-14 ENCOUNTER — TELEPHONE (OUTPATIENT)
Dept: TRANSPLANT | Facility: CLINIC | Age: 62
End: 2017-02-14

## 2017-02-14 ENCOUNTER — OFFICE VISIT (OUTPATIENT)
Dept: UROLOGY | Facility: CLINIC | Age: 62
End: 2017-02-14
Payer: COMMERCIAL

## 2017-02-14 VITALS
SYSTOLIC BLOOD PRESSURE: 123 MMHG | HEART RATE: 72 BPM | BODY MASS INDEX: 25.27 KG/M2 | WEIGHT: 190.69 LBS | HEIGHT: 73 IN | DIASTOLIC BLOOD PRESSURE: 71 MMHG

## 2017-02-14 DIAGNOSIS — I50.33 ACUTE ON CHRONIC DIASTOLIC CONGESTIVE HEART FAILURE: ICD-10-CM

## 2017-02-14 DIAGNOSIS — F32.A DEPRESSION, UNSPECIFIED DEPRESSION TYPE: ICD-10-CM

## 2017-02-14 DIAGNOSIS — R35.1 NOCTURIA MORE THAN TWICE PER NIGHT: ICD-10-CM

## 2017-02-14 DIAGNOSIS — Z12.5 PROSTATE CANCER SCREENING: ICD-10-CM

## 2017-02-14 DIAGNOSIS — B18.2 HEP C W/O COMA, CHRONIC: Primary | ICD-10-CM

## 2017-02-14 DIAGNOSIS — N18.2 CKD (CHRONIC KIDNEY DISEASE) STAGE 2, GFR 60-89 ML/MIN: Chronic | ICD-10-CM

## 2017-02-14 DIAGNOSIS — D63.8 ANEMIA OF CHRONIC DISEASE: ICD-10-CM

## 2017-02-14 DIAGNOSIS — I10 ESSENTIAL HYPERTENSION: ICD-10-CM

## 2017-02-14 LAB
BILIRUB SERPL-MCNC: NORMAL MG/DL
BLOOD URINE, POC: NORMAL
COLOR, POC UA: NORMAL
GLUCOSE UR QL STRIP: 100
KETONES UR QL STRIP: NORMAL
LEUKOCYTE ESTERASE URINE, POC: NORMAL
NITRITE, POC UA: NORMAL
PH, POC UA: 5
PROTEIN, POC: NORMAL
SPECIFIC GRAVITY, POC UA: 1.05
UROBILINOGEN, POC UA: NORMAL

## 2017-02-14 PROCEDURE — 99999 PR PBB SHADOW E&M-EST. PATIENT-LVL III: CPT | Mod: PBBFAC,,, | Performed by: UROLOGY

## 2017-02-14 PROCEDURE — 99244 OFF/OP CNSLTJ NEW/EST MOD 40: CPT | Mod: 25,S$GLB,, | Performed by: UROLOGY

## 2017-02-14 PROCEDURE — 51798 US URINE CAPACITY MEASURE: CPT | Mod: S$GLB,,, | Performed by: UROLOGY

## 2017-02-14 PROCEDURE — 81001 URINALYSIS AUTO W/SCOPE: CPT | Mod: S$GLB,,, | Performed by: UROLOGY

## 2017-02-14 RX ORDER — TAMSULOSIN HYDROCHLORIDE 0.4 MG/1
0.4 CAPSULE ORAL
Qty: 30 CAPSULE | Refills: 12 | Status: SHIPPED | OUTPATIENT
Start: 2017-02-14 | End: 2017-02-14 | Stop reason: SDUPTHER

## 2017-02-14 RX ORDER — TAMSULOSIN HYDROCHLORIDE 0.4 MG/1
0.4 CAPSULE ORAL
Qty: 30 CAPSULE | Refills: 12 | Status: SHIPPED | OUTPATIENT
Start: 2017-02-14 | End: 2018-03-09 | Stop reason: SDUPTHER

## 2017-02-14 NOTE — TELEPHONE ENCOUNTER
----- Message from Mima Brasher sent at 2/14/2017  9:32 AM CST -----  Contact: pt  Called to see if Flomax will interfere with Harvoni, please call back at [phone]

## 2017-02-14 NOTE — MR AVS SNAPSHOT
[facility] Urology 4th Floor  [address]  [place] [address]  Phone: [phone]                  Ravi Zamorano   2017 9:00 AM   Office Visit    Description:  Male : 1955   Provider:  Makayla Mckeon MD   Department:  [facility] Urolog 4th Floor           Reason for Visit     Ck prostate           Diagnoses this Visit        Comments    Hep C w/o coma, chronic    -  Primary     Essential hypertension         Anemia of chronic disease         CKD (chronic kidney disease) stage 2, GFR 60-89 ml/min         Acute on chronic diastolic congestive heart failure         Depression, unspecified depression type         Prostate cancer screening         Nocturia more than twice per night                To Do List           Future Appointments        Provider Department Dept Phone    3/8/2017 10:00 AM [facility], [facility]PL[facility] [phone]    3/22/2017 8:30 AM Makayla Mckeon MD [facility] Urolog 4th [facility] [phone]    2017 10:00 AM LAB, TRANSPLANT [facility] [phone]    2017 7:30 AM Karan Pruitt APRN, FNP [facility] Endocrinology [phone]      Goals (5 Years of Data)     None       These Medications        Disp Refills Start End    tamsulosin (FLOMAX) 0.4 mg Cp24 30 capsule 12 2017 3/16/2017    Take 1 capsule (0.4 mg total) by mouth after dinner. - Oral    Pharmacy: [facility] [place] [facility]  Ph #: [phone]         [facility] On Call     [facility] On [facility] Nurse Care Line -  Assistance  Registered nurses in the [facility] Center provide clinical advisement, health education, appointment booking, and other advisory services.  Call for this free service at [phone].             Medications           Message regarding Medications     Verify the changes and/or additions to your medication regime listed below are the same as discussed with your clinician  today.  If any of these changes or additions are incorrect, please notify your healthcare provider.        START taking these NEW medications        Refills    tamsulosin (FLOMAX) 0.4 mg Cp24 12    Sig: Take 1 capsule (0.4 mg total) by mouth after dinner.    Class: Normal    Route: Oral           Verify that the below list of medications is an accurate representation of the medications you are currently taking.  If none reported, the list may be blank. If incorrect, please contact your healthcare provider. Carry this list with you in case of emergency.           Current Medications     aspirin 81 MG Chew Take 81 mg by mouth once daily.    atorvastatin (LIPITOR) 10 MG tablet Take 1 tablet (10 mg total) by mouth once daily.    blood sugar diagnostic Strp 1 strip by Misc.(Non-Drug; Combo Route) route 4 (four) times daily before meals and nightly. e11.65    blood-glucose meter (FREESTYLE SYSTEM KIT) kit Use as instructed e11.65 may substitute meter as covered by insurance    carvedilol (COREG) 12.5 MG tablet Take 12.5 mg by mouth 2 (two) times daily.    cholecalciferol, vitamin D3, 1,000 unit capsule Take 1 capsule (1,000 Units total) by mouth once daily.    docusate sodium (COLACE) 100 MG capsule Take 1 capsule (100 mg total) by mouth 2 (two) times daily as needed for Constipation.    famotidine (PEPCID) 20 MG tablet Take 1 tablet (20 mg total) by mouth every evening.    HARVONI  mg Tab Take 1 tablet by mouth once daily. Take at same time as Famotidine 20mg    insulin detemir (LEVEMIR FLEXTOUCH) 100 unit/mL (3 mL) SubQ InPn pen Inject 28 Units into the skin once daily.    insulin lispro (HUMALOG KWIKPEN) 100 unit/mL InPn pen Inject 16 units w/ meals plus scale as directed. 4 units w/ bedtime snack.    lancets Misc 1 each by Misc.(Non-Drug; Combo Route) route 4 (four) times daily before meals and nightly. e11.65    linagliptin (TRADJENTA) 5 mg Tab tablet Take 1 tablet (5 mg total) by mouth once daily.    magnesium  "oxide (MAG-OX) 400 mg tablet Take 1 tablet (400 mg total) by mouth 2 (two) times daily.    multivitamin (THERAGRAN) tablet Take 1 tablet by mouth once daily.    mycophenolate (CELLCEPT) 250 mg Cap Take 4 capsules (1,000 mg total) by mouth 2 (two) times daily.    nicotine (NICODERM CQ) 7 mg/24 hr Place 1 patch onto the skin once daily. NAME BRAND ONLY    nicotine polacrilex 2 MG Lozg TAKE 1 LOZENGE BY MOUTH AS NEEDED. (6-16 LOZENGES AS NEEDED)    nifedipine 30 MG ORAL TR24 (PROCARDIA-XL) 30 MG (OSM) 24 hr tablet Take 1 tablet (30 mg total) by mouth once daily.    ONETOUCH ULTRA TEST Strp     pen needle, diabetic 32 gauge x 5/32" Ndle 1 each by Misc.(Non-Drug; Combo Route) route 4 (four) times daily.    predniSONE (DELTASONE) 10 MG tablet Take 20 mg PO QD 11/8-12/7; 15 mg PO QD 12/8-1/7; 10 mg PO QD 1/8-2/7; then 5 mg PO QD thereafter    ropinirole (REQUIP) 0.5 MG tablet Take 0.5 mg by mouth once daily.    sulfamethoxazole-trimethoprim 400-80mg (BACTRIM,SEPTRA) 400-80 mg per tablet Take 1 tablet by mouth once daily. Stop: 11/5/17    tacrolimus (PROGRAF) 1 MG Cap Take 3 capsules (3 mg total) by mouth every 12 (twelve) hours. Z94.0 kidney transplant 11/5/2016    tamsulosin (FLOMAX) 0.4 mg Cp24 Take 1 capsule (0.4 mg total) by mouth after dinner.           Clinical Reference Information           Your Vitals Were     BP Pulse Height Weight BMI    123/71 72 6' 1" (1.854 m) 86.5 kg (190 lb 11.2 oz) 25.16 kg/m2      Blood Pressure          Most Recent Value    BP  123/71      Allergies as of 2/14/2017     No Known Allergies      Immunizations Administered on Date of Encounter - 2/14/2017     None      Orders Placed During Today's Visit      Normal Orders This Visit    Bladder scan     POCT urinalysis, dipstick or tablet reag          2/14/2017  9:14 AM - Margoth Hyatt LPN      Component Results     Component    Color, UA    Spec Grav, UA    1.050    pH, UA    5    WBC, UA    n    Nitrite, UA    n    Protein, UA    n    " Glucose, UA    100    Ketones, UA    n    Urobilinogen, UA    n    Bilirubin, UA    n    Blood, UA    n            Maintenance Dialysis History     Start End Type Comments Center    5/6/2015  [facility]            Current [facility] Center Information     [facility] [address] Phone #:  [phone]    Contact:  [place]  [address] Fax #:  [phone]            Transplant Information        Txp Date Organ Coordinator Care Team    11/5/2016 Kidney Kena Robles RN Current Nephrologist:  Alexi Glasgow MD   Referring Physician:  Alexi Glasgow MD   Surgeon:  Constantino Gillette MD   Assisting Surgeon:  Shayy Angeles MD         [facility] Sign-Up     Activating your [facility] account is as easy as 1-2-3!     1) Visit [email or web address], select Sign Up Now, enter this activation code and your date of birth, then select Next.  [email or web address]  Expires: 3/30/2017  2:03 PM      2) Create a username and password to use when you visit [facility] in the future and select a security question in case you lose your password and select Next.    3) Enter your e-mail address and click Sign Up!    Additional Information  If you have questions, please e-mail [email or web address] or call [phone] to talk to our [facility] staff. Remember, [facility] is NOT to be used for urgent needs. For medical emergencies, dial 911.         Language Assistance Services     ATTENTION: Language assistance services are available, free of charge. Please call [phone].      ATENCIÓN: Si habla español, tiene a moreno disposición servicios gratuitos de asistencia lingüística. Llame al [phone].     CHÚ Ý: N?u b?n nói Ti?ng Vi?t, có các d?ch v? h? tr? ngôn ng? mi?n phí dành cho b?n. G?i s? [phone].         [facility] - Urology 4th Floor complies with applicable Federal civil rights laws and does not discriminate on the basis of race, color, national origin, age, disability, or sex.

## 2017-02-14 NOTE — LETTER
February 14, 2017      Risa Blackman MD  [address]  [place] [address]           [facility] - Urology 4th Floor  [address]  [facility] [place] [address]  Phone: [phone]          Patient: Ravi Zamorano   MR Number: [id number]   YOB: 1955   Date of Visit: 2/14/2017       Dear Dr. Risa Blackman:    Thank you for referring Ravi Zamorano to me for evaluation. Attached you will find relevant portions of my assessment and plan of care.    If you have questions, please do not hesitate to call me. I look forward to following aRvi Zamorano along with you.    Sincerely,    Makayla Mckeon MD    Enclosure  CC:  No Recipients    If you would like to receive this communication electronically, please contact [email or web address] or [phone] to request more information on [employer] access.    For providers and/or their staff who would like to refer a patient to [facility], please contact us through our one-stop-shop provider referral line, [facility] [place], at [phone].    If you feel you have received this communication in error or would no longer like to receive these types of communications, please e-mail [email or web address]

## 2017-02-14 NOTE — PROGRESS NOTES
Subjective:       Patient ID: Ravi Zamorano is a 61 y.o. male.    Chief Complaint: Ck prostate    HPI Comments: Ravi Zamorano is a 61 y.o. Male here for nocturia. Patient referred from Risa Blackman*.   Patient states this was a problem since before his kidney transplant.   Nocturia every 1 hour currently. This has been present for 4-5 years at least  He has had diabetes for 15 years.    Urinary frequency every 2-3 hours. No urgency, urge incontinence.   No gross hematuria.   No intermittency. No hesitancy.   No recurrent UTI.   No family hx of prostate cancer.   11/16 kidney transplant cadaver.      Lab Results       Component                Value               Date                       PSA                      2.0                 10/24/2016                 PSA                      1.4                 07/29/2014                    Past Surgical History   Procedure Laterality Date    Appendectomy      Kidney transplant         Past Medical History   Diagnosis Date    Anxiety 7/29/2014    CKD (chronic kidney disease) stage 2, GFR 60-89 ml/min 12/28/2016    CKD (chronic kidney disease) stage 4, GFR 15-29 ml/min 7/29/2014    Depression - situational 7/29/2014    Diabetes mellitus     Diabetes type 2 since 2000 7/29/2014    Diabetic neuropathy 7/29/2014    Hep C w/o coma, chronic - diagnosed 2000 7/29/2014    Hyperlipidemia 7/29/2014    Hypertension     Neuropathy     Organ transplant candidate 7/29/2014    Pancreatitis        Social History     Social History    Marital status:      Spouse name: N/A    Number of children: N/A    Years of education: N/A     Occupational History     [facility]     Social History Main Topics    Smoking status: Former Smoker     Packs/day: 0.50     Years: 32.00     Types: Cigarettes     Quit date: 11/28/2016    Smokeless tobacco: Never Used      Comment: Pt reports that he quit in 2013, but started up again in 2015. pt reports he is  currently working on quitting again    Alcohol use Yes      Comment: Pt reports occasional beers on Sundays. Pt reports drinking daily prior to ESRD diagnosis.    Drug use: No    Sexual activity: Yes     Partners: Female     Other Topics Concern    Not on file     Social History Narrative     for 14 years     for 29 years    2 children ages 35, 36       Family History   Problem Relation Age of Onset    Diabetes Mother     Hypertension Mother     Heart disease Mother      CAD with PCI    Heart disease Father     Cancer Brother      one sister with breast cancer    Hypertension Brother      one sister with HTN and borderline DM    Stroke Neg Hx     Kidney disease Neg Hx        Current Outpatient Prescriptions   Medication Sig Dispense Refill    aspirin 81 MG Chew Take 81 mg by mouth once daily.      atorvastatin (LIPITOR) 10 MG tablet Take 1 tablet (10 mg total) by mouth once daily. 30 tablet 6    blood sugar diagnostic Strp 1 strip by Misc.(Non-Drug; Combo Route) route 4 (four) times daily before meals and nightly. e11.65 100 strip 5    blood-glucose meter (FREESTYLE SYSTEM KIT) kit Use as instructed e11.65 may substitute meter as covered by insurance 1 each 0    carvedilol (COREG) 12.5 MG tablet Take 12.5 mg by mouth 2 (two) times daily.  0    cholecalciferol, vitamin D3, 1,000 unit capsule Take 1 capsule (1,000 Units total) by mouth once daily.  0    docusate sodium (COLACE) 100 MG capsule Take 1 capsule (100 mg total) by mouth 2 (two) times daily as needed for Constipation. 60 capsule 3    famotidine (PEPCID) 20 MG tablet Take 1 tablet (20 mg total) by mouth every evening. 30 tablet 11    HARVONI  mg Tab Take 1 tablet by mouth once daily. Take at same time as Famotidine 20mg 28 tablet 2    insulin detemir (LEVEMIR FLEXTOUCH) 100 unit/mL (3 mL) SubQ InPn pen Inject 28 Units into the skin once daily. (Patient taking differently: Inject 32 Units into the skin once daily.  ") 1 Box 4    insulin lispro (HUMALOG KWIKPEN) 100 unit/mL InPn pen Inject 16 units w/ meals plus scale as directed. 4 units w/ bedtime snack. 2 Box 11    lancets Misc 1 each by Misc.(Non-Drug; Combo Route) route 4 (four) times daily before meals and nightly. e11.65 100 each 0    linagliptin (TRADJENTA) 5 mg Tab tablet Take 1 tablet (5 mg total) by mouth once daily. 30 tablet 11    magnesium oxide (MAG-OX) 400 mg tablet Take 1 tablet (400 mg total) by mouth 2 (two) times daily. 60 tablet 11    multivitamin (THERAGRAN) tablet Take 1 tablet by mouth once daily.      mycophenolate (CELLCEPT) 250 mg Cap Take 4 capsules (1,000 mg total) by mouth 2 (two) times daily. 240 capsule 11    nicotine (NICODERM CQ) 7 mg/24 hr Place 1 patch onto the skin once daily. NAME BRAND ONLY 28 patch 1    nicotine polacrilex 2 MG Lozg TAKE 1 LOZENGE BY MOUTH AS NEEDED. (6-16 LOZENGES AS NEEDED) 30 lozenge 3    nifedipine 30 MG ORAL TR24 (PROCARDIA-XL) 30 MG (OSM) 24 hr tablet Take 1 tablet (30 mg total) by mouth once daily. 30 tablet 11    ONETOUCH ULTRA TEST Strp   0    pen needle, diabetic 32 gauge x 5/32" Ndle 1 each by Misc.(Non-Drug; Combo Route) route 4 (four) times daily. 100 each 4    predniSONE (DELTASONE) 10 MG tablet Take 20 mg PO QD 11/8-12/7; 15 mg PO QD 12/8-1/7; 10 mg PO QD 1/8-2/7; then 5 mg PO QD thereafter 60 tablet 11    ropinirole (REQUIP) 0.5 MG tablet Take 0.5 mg by mouth once daily.      sulfamethoxazole-trimethoprim 400-80mg (BACTRIM,SEPTRA) 400-80 mg per tablet Take 1 tablet by mouth once daily. Stop: 11/5/17 30 tablet 11    tacrolimus (PROGRAF) 1 MG Cap Take 3 capsules (3 mg total) by mouth every 12 (twelve) hours. Z94.0 kidney transplant 11/5/2016 180 capsule 11     No current facility-administered medications for this visit.        Review of patient's allergies indicates:  No Known Allergies    Review of Systems   Constitutional: Negative for chills, fever and unexpected weight change.   HENT: " Negative for hearing loss and nosebleeds.    Eyes: Negative for visual disturbance.        Wears glasses.   Respiratory: Negative for chest tightness.    Cardiovascular: Negative for chest pain.   Gastrointestinal: Negative for diarrhea.   Genitourinary: Positive for frequency and nocturia. Negative for dysuria, hematuria and urgency.   Musculoskeletal: Negative for joint swelling.   Skin: Negative for rash.   Neurological: Negative for seizures.   Hematological: Does not bruise/bleed easily.   Psychiatric/Behavioral: Negative for behavioral problems.       Objective:      Physical Exam   Constitutional: He is oriented to person, place, and time. He appears well-developed and well-nourished.   HENT:   Head: Normocephalic and atraumatic.   Eyes: No scleral icterus.   Neck: Neck supple.   Cardiovascular: Normal rate and regular rhythm.    Pulmonary/Chest: Effort normal. No respiratory distress.   Abdominal: He exhibits no mass.   Musculoskeletal: He exhibits no tenderness.   Lymphadenopathy:     He has no cervical adenopathy.   Neurological: He is alert and oriented to person, place, and time.   Skin: Skin is warm. No rash noted.     Psychiatric: He has a normal mood and affect.     A post void residual bladder scan was performed immediately after voiding. The patient's PVR was noted to be 126cc.  Urine showed 100 sugar  Assessment:       1. Hep C w/o coma, chronic - diagnosed 2000    2. Essential hypertension    3. Anemia of chronic disease    4. CKD (chronic kidney disease) stage 2, GFR 60-89 ml/min    5. Acute on chronic diastolic congestive heart failure    6. Depression, unspecified depression type    7. Prostate cancer screening    8. Nocturia more than twice per night        Plan:     trial of flomax. F/u in 4-6 weeks. If not improvement, will stop and try anticholinergic.     I would like to thank Risa Blackman* for referral of this patient.

## 2017-02-14 NOTE — TELEPHONE ENCOUNTER
----- Message from Pricila Matos PharmD sent at 2/14/2017 11:22 AM CST -----  No contraindications to taking tamsulosin.   ----- Message -----     From: Kena Robles RN     Sent: 2/14/2017  10:31 AM       To: Abdominal Transplant Pharmacists    Is there any contraindication for tamsulosin for this patient?

## 2017-02-21 ENCOUNTER — TELEPHONE (OUTPATIENT)
Dept: PHARMACY | Facility: CLINIC | Age: 62
End: 2017-02-21

## 2017-02-21 NOTE — TELEPHONE ENCOUNTER
Patient reached out to [facility] regarding a letter he received that he must fill his specialty medication with [facility]. Inform patient when I processed the claim the medication was RTS 02/24/2017 and patient is only allow 2 fill with [facility] and must use specialty pharmacy. Inform patient I will message the provider to send the prescription over to them and wait til after 02/24 to schedule delivery of the medication. Will follow up.

## 2017-02-21 NOTE — TELEPHONE ENCOUNTER
Harvoni co-pay coupon card information:   BIN: [id number]  RxPCN: [id number]  Issuer: ([id number]  Group Number: [id number]  ID#: [id number]      Transfer to [facility]  Telephone: [phone]  Fax: [phone]

## 2017-02-22 RX ORDER — LEDIPASVIR AND SOFOSBUVIR 90; 400 MG/1; MG/1
1 TABLET, FILM COATED ORAL DAILY
Qty: 28 TABLET | Refills: 0 | Status: SHIPPED | OUTPATIENT
Start: 2017-02-22 | End: 2017-04-07

## 2017-02-27 ENCOUNTER — TELEPHONE (OUTPATIENT)
Dept: PHARMACY | Facility: CLINIC | Age: 62
End: 2017-02-27

## 2017-03-08 ENCOUNTER — LAB VISIT (OUTPATIENT)
Dept: LAB | Facility: HOSPITAL | Age: 62
End: 2017-03-08
Attending: INTERNAL MEDICINE
Payer: COMMERCIAL

## 2017-03-08 ENCOUNTER — CLINICAL SUPPORT (OUTPATIENT)
Dept: SMOKING CESSATION | Facility: CLINIC | Age: 62
End: 2017-03-08
Payer: COMMERCIAL

## 2017-03-08 ENCOUNTER — EPISODE CHANGES (OUTPATIENT)
Dept: HEPATOLOGY | Facility: CLINIC | Age: 62
End: 2017-03-08

## 2017-03-08 DIAGNOSIS — B18.2 CHRONIC HEPATITIS C WITHOUT HEPATIC COMA: ICD-10-CM

## 2017-03-08 DIAGNOSIS — F17.200 NICOTINE DEPENDENCE: Primary | ICD-10-CM

## 2017-03-08 DIAGNOSIS — Z94.0 KIDNEY REPLACED BY TRANSPLANT: ICD-10-CM

## 2017-03-08 LAB
ALBUMIN SERPL BCP-MCNC: 3.7 G/DL
ALBUMIN SERPL BCP-MCNC: 3.7 G/DL
ALP SERPL-CCNC: 59 U/L
ALT SERPL W/O P-5'-P-CCNC: 8 U/L
ANION GAP SERPL CALC-SCNC: 7 MMOL/L
ANION GAP SERPL CALC-SCNC: 7 MMOL/L
AST SERPL-CCNC: 17 U/L
BASOPHILS # BLD AUTO: 0.04 K/UL
BASOPHILS NFR BLD: 0.9 %
BILIRUB SERPL-MCNC: 0.4 MG/DL
BUN SERPL-MCNC: 15 MG/DL
BUN SERPL-MCNC: 15 MG/DL
CALCIUM SERPL-MCNC: 9.4 MG/DL
CALCIUM SERPL-MCNC: 9.4 MG/DL
CHLORIDE SERPL-SCNC: 105 MMOL/L
CHLORIDE SERPL-SCNC: 105 MMOL/L
CO2 SERPL-SCNC: 24 MMOL/L
CO2 SERPL-SCNC: 24 MMOL/L
CREAT SERPL-MCNC: 1.2 MG/DL
CREAT SERPL-MCNC: 1.2 MG/DL
DIFFERENTIAL METHOD: ABNORMAL
EOSINOPHIL # BLD AUTO: 0.2 K/UL
EOSINOPHIL NFR BLD: 3.3 %
ERYTHROCYTE [DISTWIDTH] IN BLOOD BY AUTOMATED COUNT: 14.5 %
EST. GFR  (AFRICAN AMERICAN): >60 ML/MIN/1.73 M^2
EST. GFR  (AFRICAN AMERICAN): >60 ML/MIN/1.73 M^2
EST. GFR  (NON AFRICAN AMERICAN): >60 ML/MIN/1.73 M^2
EST. GFR  (NON AFRICAN AMERICAN): >60 ML/MIN/1.73 M^2
GLUCOSE SERPL-MCNC: 229 MG/DL
GLUCOSE SERPL-MCNC: 229 MG/DL
HCT VFR BLD AUTO: 38.2 %
HGB BLD-MCNC: 12.2 G/DL
LYMPHOCYTES # BLD AUTO: 1 K/UL
LYMPHOCYTES NFR BLD: 22.7 %
MAGNESIUM SERPL-MCNC: 1.4 MG/DL
MCH RBC QN AUTO: 24.5 PG
MCHC RBC AUTO-ENTMCNC: 31.9 %
MCV RBC AUTO: 77 FL
MONOCYTES # BLD AUTO: 0.9 K/UL
MONOCYTES NFR BLD: 19.3 %
NEUTROPHILS # BLD AUTO: 2.4 K/UL
NEUTROPHILS NFR BLD: 53.8 %
PHOSPHATE SERPL-MCNC: 2.7 MG/DL
PLATELET # BLD AUTO: 121 K/UL
PLATELET BLD QL SMEAR: ABNORMAL
PMV BLD AUTO: ABNORMAL FL
POTASSIUM SERPL-SCNC: 4.5 MMOL/L
POTASSIUM SERPL-SCNC: 4.5 MMOL/L
PROT SERPL-MCNC: 7.2 G/DL
RBC # BLD AUTO: 4.98 M/UL
SODIUM SERPL-SCNC: 136 MMOL/L
SODIUM SERPL-SCNC: 136 MMOL/L
TACROLIMUS BLD-MCNC: 13.6 NG/ML
WBC # BLD AUTO: 4.5 K/UL

## 2017-03-08 PROCEDURE — 80197 ASSAY OF TACROLIMUS: CPT

## 2017-03-08 PROCEDURE — 87799 DETECT AGENT NOS DNA QUANT: CPT

## 2017-03-08 PROCEDURE — 80053 COMPREHEN METABOLIC PANEL: CPT

## 2017-03-08 PROCEDURE — 85025 COMPLETE CBC W/AUTO DIFF WBC: CPT

## 2017-03-08 PROCEDURE — 84100 ASSAY OF PHOSPHORUS: CPT

## 2017-03-08 PROCEDURE — 83735 ASSAY OF MAGNESIUM: CPT

## 2017-03-08 PROCEDURE — 90832 PSYTX W PT 30 MINUTES: CPT | Mod: S$GLB,,,

## 2017-03-08 NOTE — PROGRESS NOTES
"Individual Follow-Up Form    3/8/2017    Quit Date: 12/9/16    Clinical Status of Patient: Outpatient    Length of Service: 30mins    Continuing Medication: Yes    Other Medications: N/A     Target Symptoms: Withdrawal and medication side effects. The following were  rated moderate (3) to severe (4) on TCRS:  · Moderate (3): N/A  · Severe (4): Increased Appetite    Comments: Pt has not smoked since December 9th, down from a half a pack a day, denies any urges or cravings, feels he has overcome his habit, states his wife is actually smoking more, and the Pt denies any desire to smoke, states that his wife went out of town recently and left a pack of cigarettes at home w/ the Pt and he had no urge to "sneak one", LPC praised Pt for progress made thus far.     Diagnosis: F17.200    Next Visit: 4/5/17  "

## 2017-03-09 LAB
CYTOMEGALOVIRUS DNA: NOT DETECTED
CYTOMEGALOVIRUS LOG (IU/ML): <2.4 LOG (10) COPIES/ML
CYTOMEGALOVIRUS PCR, QUANT: <250 COPIES/ML
CYTOMEGALOVIRUS SOURCE: NORMAL

## 2017-03-09 RX ORDER — TACROLIMUS 1 MG/1
CAPSULE ORAL
Qty: 150 CAPSULE | Refills: 11 | Status: SHIPPED | OUTPATIENT
Start: 2017-03-09 | End: 2017-03-15 | Stop reason: DRUGHIGH

## 2017-03-09 NOTE — TELEPHONE ENCOUNTER
----- Message from Sylvie Frias MD sent at 3/9/2017  2:47 PM CST -----  Prograf elevated --> if true 12 hour trough decrease Prograf from 3 mg BID to 3/2. Repeat labs next week

## 2017-03-09 NOTE — TELEPHONE ENCOUNTER
Notified patient of Dr. Frias's review of 3/8/17 lab results. Instructed patient to decrease prograf to 3mg in AM & 2mg in PM; repeat labs 3/15/17. Patient verbalized understanding.

## 2017-03-09 NOTE — PROGRESS NOTES
Prograf elevated --> if true 12 hour trough decrease Prograf from 3 mg BID to 3/2. Repeat labs next week

## 2017-03-10 ENCOUNTER — TELEPHONE (OUTPATIENT)
Dept: HEPATOLOGY | Facility: CLINIC | Age: 62
End: 2017-03-10

## 2017-03-10 NOTE — TELEPHONE ENCOUNTER
HCV LAB REVIEW  Week 8 of Harvoni, planning on 12 weeks treatment  Post kidney transplant  Nicole 1a, tx naive, F0-1    Pertinent labs:  3/8/17  CMP stable      pls call pt:  Labs look good. Liver enzymes normal.   - Continue Harvoni - 1 pill daily - don't miss any doses. Take this WITH famotidine 20mg at 8pm.  - remind him to separate the sodium bicarb from the harvoni. Sodium bicarb at 8am, 2pm   ONE month left of treatment!      Next labs due   - CMP, HCV RNA at week 12 - end of treatment - 4/5

## 2017-03-15 ENCOUNTER — LAB VISIT (OUTPATIENT)
Dept: LAB | Facility: HOSPITAL | Age: 62
End: 2017-03-15
Payer: COMMERCIAL

## 2017-03-15 DIAGNOSIS — Z94.0 KIDNEY REPLACED BY TRANSPLANT: ICD-10-CM

## 2017-03-15 LAB — TACROLIMUS BLD-MCNC: 12.1 NG/ML

## 2017-03-15 PROCEDURE — 80197 ASSAY OF TACROLIMUS: CPT

## 2017-03-15 PROCEDURE — 36415 COLL VENOUS BLD VENIPUNCTURE: CPT

## 2017-03-15 RX ORDER — TACROLIMUS 1 MG/1
2 CAPSULE ORAL EVERY 12 HOURS
Qty: 120 CAPSULE | Refills: 11 | Status: SHIPPED | OUTPATIENT
Start: 2017-03-15 | End: 2018-02-19 | Stop reason: SDUPTHER

## 2017-03-15 NOTE — TELEPHONE ENCOUNTER
----- Message from Sylvie Frias MD sent at 3/15/2017 12:01 PM CDT -----  Prograf elevated --> if true 12 hour trough decrease Prograf from 3/2 to 2 mg BID. Repeat labs next week

## 2017-03-15 NOTE — PROGRESS NOTES
Prograf elevated --> if true 12 hour trough decrease Prograf from 3/2 to 2 mg BID. Repeat labs next week

## 2017-03-23 ENCOUNTER — LAB VISIT (OUTPATIENT)
Dept: LAB | Facility: HOSPITAL | Age: 62
End: 2017-03-23
Attending: INTERNAL MEDICINE
Payer: COMMERCIAL

## 2017-03-23 DIAGNOSIS — Z94.0 KIDNEY REPLACED BY TRANSPLANT: ICD-10-CM

## 2017-03-23 LAB — TACROLIMUS BLD-MCNC: 9.2 NG/ML

## 2017-03-23 PROCEDURE — 36415 COLL VENOUS BLD VENIPUNCTURE: CPT

## 2017-03-23 PROCEDURE — 80197 ASSAY OF TACROLIMUS: CPT

## 2017-03-27 ENCOUNTER — OFFICE VISIT (OUTPATIENT)
Dept: INTERNAL MEDICINE | Facility: CLINIC | Age: 62
End: 2017-03-27
Payer: COMMERCIAL

## 2017-03-27 VITALS
HEIGHT: 73 IN | DIASTOLIC BLOOD PRESSURE: 80 MMHG | HEART RATE: 65 BPM | TEMPERATURE: 98 F | SYSTOLIC BLOOD PRESSURE: 160 MMHG | BODY MASS INDEX: 25.31 KG/M2 | WEIGHT: 191 LBS | OXYGEN SATURATION: 100 %

## 2017-03-27 DIAGNOSIS — R42 VERTIGO: Primary | ICD-10-CM

## 2017-03-27 PROCEDURE — 1160F RVW MEDS BY RX/DR IN RCRD: CPT | Mod: S$GLB,,, | Performed by: NURSE PRACTITIONER

## 2017-03-27 PROCEDURE — 99213 OFFICE O/P EST LOW 20 MIN: CPT | Mod: S$GLB,,, | Performed by: NURSE PRACTITIONER

## 2017-03-27 PROCEDURE — S0119 ONDANSETRON 4 MG: HCPCS | Mod: S$GLB,,, | Performed by: NURSE PRACTITIONER

## 2017-03-27 PROCEDURE — 99999 PR PBB SHADOW E&M-EST. PATIENT-LVL V: CPT | Mod: PBBFAC,,, | Performed by: NURSE PRACTITIONER

## 2017-03-27 RX ORDER — ONDANSETRON 4 MG/1
4 TABLET, ORALLY DISINTEGRATING ORAL
Status: COMPLETED | OUTPATIENT
Start: 2017-03-27 | End: 2017-03-27

## 2017-03-27 RX ORDER — MECLIZINE HYDROCHLORIDE 25 MG/1
25 TABLET ORAL 3 TIMES DAILY PRN
Qty: 21 TABLET | Refills: 0 | Status: SHIPPED | OUTPATIENT
Start: 2017-03-27 | End: 2017-11-03 | Stop reason: SDUPTHER

## 2017-03-27 RX ORDER — MECLIZINE HYDROCHLORIDE 25 MG/1
25 TABLET ORAL 3 TIMES DAILY PRN
Qty: 21 TABLET | Refills: 0 | Status: SHIPPED | OUTPATIENT
Start: 2017-03-27 | End: 2017-03-27 | Stop reason: SDUPTHER

## 2017-03-27 RX ADMIN — ONDANSETRON 4 MG: 4 TABLET, ORALLY DISINTEGRATING ORAL at 05:03

## 2017-03-27 NOTE — PROGRESS NOTES
I have reviewed the notes, assessments, and/or procedures performed by GAIL Waggoner, I concur with her/his documentation of Ravi Zamorano.  Subjective:        Patient ID: Ravi Zamorano is a 61 y.o. male.     Chief Complaint: Dizziness     Dizziness:   Chronicity: New  Onset: Today  Progression since onset: Unchanged  Frequency - weeks/days included: only when lying flat or quick movements of his head.  Pain Scale: 0/10  Severity: Moderate  Duration: 5 minutes (5-10 minutes)  Dizziness characteristics: feels like room is spinning.  Associated symptoms: nausea.no hearing loss, no ear congestion, no ear pain, no fever, no headaches, no tinnitus, no vomiting, no diaphoresis, no aural fullness, no weakness, no visual disturbances, no light-headedness, no syncope, no palpitations, no panic, no facial weakness, no slurred speech, no numbness in extremities and no chest pain.  Aggravated by: Lying down  Treatments tried: treid meclizine in the past with good results.no strokes.  DM, CKD with renal transplant  CBG today 187 when he checked it. No orthostatic dizziness reported today. He laid down this morning after eating breakfast and felt like the room was spinning, also got diaphoretic and nauseated at that time. Resolved with laying still.         Vitals:     03/27/17 1659   BP: (!) 160/80   Pulse: 65   Temp: 97.7 °F (36.5 °C)   O2 100%, RR 17     Review of Systems   Constitutional: Negative for activity change, appetite change, chills, diaphoresis, fever and unexpected weight change.   HENT: Negative for congestion, ear discharge, ear pain, hearing loss, nosebleeds, sinus pressure, sneezing, sore throat and tinnitus.   Eyes: Negative for photophobia, discharge, redness, itching and visual disturbance.   Respiratory: Negative for cough, chest tightness, shortness of breath, wheezing and stridor.   Cardiovascular: Negative for chest pain, palpitations, leg swelling and syncope.   Gastrointestinal: Positive for  nausea. Negative for abdominal distention, abdominal pain, blood in stool, constipation, diarrhea and vomiting.   Genitourinary: Negative for dysuria, frequency, hematuria, scrotal swelling, testicular pain and urgency.   Musculoskeletal: Negative for arthralgias, back pain, myalgias, neck pain and neck stiffness.   Skin: Negative for rash and wound.   Neurological: Positive for dizziness. Negative for tremors, syncope, facial asymmetry, speech difficulty, weakness, light-headedness, numbness and headaches.   Hematological: Negative for adenopathy. Does not bruise/bleed easily.   Psychiatric/Behavioral: Negative for confusion and suicidal ideas.       Objective:      Physical Exam   Constitutional: He is oriented to person, place, and time. He appears well-developed and well-nourished. No distress.   HENT:   Head: Normocephalic and atraumatic.   Right Ear: External ear normal.   Left Ear: External ear normal.   Nose: Nose normal.   Mouth/Throat: Oropharynx is clear and moist. No oropharyngeal exudate.   Moderate amount of dark brown cerumen to left ear   Eyes: Conjunctivae and EOM are normal. Pupils are equal, round, and reactive to light. Right eye exhibits no discharge. Left eye exhibits no discharge. No scleral icterus. Right eye exhibits normal extraocular motion and no nystagmus. Left eye exhibits normal extraocular motion and no nystagmus. Right pupil is reactive. Left pupil is reactive. Pupils are equal.   Neck: Normal range of motion. Neck supple. No tracheal deviation present. No thyromegaly present.   Cardiovascular: Normal rate, regular rhythm, normal heart sounds and intact distal pulses. Exam reveals no gallop and no friction rub.   No murmur heard.  Pulmonary/Chest: Effort normal and breath sounds normal. No stridor. No respiratory distress. He has no wheezes. He has no rales. He exhibits no tenderness.   Abdominal: Soft. Bowel sounds are normal. He exhibits no distension. There is no tenderness.  "  Musculoskeletal: Normal range of motion. He exhibits no edema, tenderness or deformity.   Neurological: He is alert and oriented to person, place, and time. He displays no tremor. No cranial nerve deficit or sensory deficit. He exhibits normal muscle tone. He displays a negative Romberg sign. Coordination and gait normal. GCS eye subscore is 4. GCS verbal subscore is 5. GCS motor subscore is 6.   Significant dizziness with norma-hallpike maneuver but no nystagmus noted   Skin: Skin is warm and dry. No rash noted. He is not diaphoretic. No erythema. No pallor.   Psychiatric: He has a normal mood and affect. His behavior is normal.   Vitals reviewed.      Assessment:       1. Vertigo        Plan:       Ravi was seen today for dizziness.     Diagnoses and all orders for this visit:     Vertigo  - meclizine (ANTIVERT) 25 mg tablet; Take 1 tablet (25 mg total) by mouth 3 (three) times daily as needed.     Instructed on eply maneuvers.      Pt has been given instructions populated from [employer] database and has verbalized understanding of the after visit summary and information contained wherein.     Follow up with a primary care provider. May go to ER for acute shortness of breath, lightheadedness, fever, or any other emergent complaints or changes in condition.     "This note will be shared with the patient"     The [employer] medical Dictation software was used during the dictation of portions or the entirety of this medical record. Phonetic or grammatic errors may exist due to the use of this software. For clarification, refer to the author of the document.             "

## 2017-03-27 NOTE — MEDICAL/APP STUDENT
Subjective:       Patient ID: Ravi Zamorano is a 61 y.o. male.    Chief Complaint: Dizziness    Dizziness:   Chronicity:  New  Onset:  Today  Progression since onset:  Unchanged  Frequency - weeks/days included: only when lying flat or quick movements of his head.  Pain Scale:  0/10  Severity:  Moderate  Duration:  5 minutes (5-10 minutes)  Dizziness characteristics: feels like room is spinning.   Associated symptoms: nausea.no hearing loss, no ear congestion, no ear pain, no fever, no headaches, no tinnitus, no vomiting, no diaphoresis, no aural fullness, no weakness, no visual disturbances, no light-headedness, no syncope, no palpitations, no panic, no facial weakness, no slurred speech, no numbness in extremities and no chest pain.  Aggravated by:  Lying down  Treatments tried: treid meclizine in the past with good results.no strokes.   DM, CKD with renal transplant  CBG today 187 when he checked it. No orthostatic dizziness reported today. He laid down this morning after eating breakfast and felt like the room was spinning, also got diaphoretic and nauseated at that time. Resolved with laying still.    Vitals:    03/27/17 1659   BP: (!) 160/80   Pulse: 65   Temp: 97.7 °F (36.5 °C)   O2 100%, RR 17    Review of Systems   Constitutional: Negative for activity change, appetite change, chills, diaphoresis, fever and unexpected weight change.   HENT: Negative for congestion, ear discharge, ear pain, hearing loss, nosebleeds, sinus pressure, sneezing, sore throat and tinnitus.    Eyes: Negative for photophobia, discharge, redness, itching and visual disturbance.   Respiratory: Negative for cough, chest tightness, shortness of breath, wheezing and stridor.    Cardiovascular: Negative for chest pain, palpitations, leg swelling and syncope.   Gastrointestinal: Positive for nausea. Negative for abdominal distention, abdominal pain, blood in stool, constipation, diarrhea and vomiting.   Genitourinary: Negative for  dysuria, frequency, hematuria, scrotal swelling, testicular pain and urgency.   Musculoskeletal: Negative for arthralgias, back pain, myalgias, neck pain and neck stiffness.   Skin: Negative for rash and wound.   Neurological: Positive for dizziness. Negative for tremors, syncope, facial asymmetry, speech difficulty, weakness, light-headedness, numbness and headaches.   Hematological: Negative for adenopathy. Does not bruise/bleed easily.   Psychiatric/Behavioral: Negative for confusion and suicidal ideas.       Objective:      Physical Exam   Constitutional: He is oriented to person, place, and time. He appears well-developed and well-nourished. No distress.   HENT:   Head: Normocephalic and atraumatic.   Right Ear: External ear normal.   Left Ear: External ear normal.   Nose: Nose normal.   Mouth/Throat: Oropharynx is clear and moist. No oropharyngeal exudate.   Moderate amount of dark brown cerumen to left ear   Eyes: Conjunctivae and EOM are normal. Pupils are equal, round, and reactive to light. Right eye exhibits no discharge. Left eye exhibits no discharge. No scleral icterus. Right eye exhibits normal extraocular motion and no nystagmus. Left eye exhibits normal extraocular motion and no nystagmus. Right pupil is reactive. Left pupil is reactive. Pupils are equal.   Neck: Normal range of motion. Neck supple. No tracheal deviation present. No thyromegaly present.   Cardiovascular: Normal rate, regular rhythm, normal heart sounds and intact distal pulses.  Exam reveals no gallop and no friction rub.    No murmur heard.  Pulmonary/Chest: Effort normal and breath sounds normal. No stridor. No respiratory distress. He has no wheezes. He has no rales. He exhibits no tenderness.   Abdominal: Soft. Bowel sounds are normal. He exhibits no distension. There is no tenderness.   Musculoskeletal: Normal range of motion. He exhibits no edema, tenderness or deformity.   Neurological: He is alert and oriented to person,  "place, and time. He displays no tremor. No cranial nerve deficit or sensory deficit. He exhibits normal muscle tone. He displays a negative Romberg sign. Coordination and gait normal. GCS eye subscore is 4. GCS verbal subscore is 5. GCS motor subscore is 6.   Significant dizziness with norma-hallpike maneuver but no nystagmus noted   Skin: Skin is warm and dry. No rash noted. He is not diaphoretic. No erythema. No pallor.   Psychiatric: He has a normal mood and affect. His behavior is normal.   Vitals reviewed.      Assessment:       1. Vertigo        Plan:       Ravi was seen today for dizziness.    Diagnoses and all orders for this visit:    Vertigo  -     meclizine (ANTIVERT) 25 mg tablet; Take 1 tablet (25 mg total) by mouth 3 (three) times daily as needed.    Instructed on eply maneuvers.     Pt has been given instructions populated from [employer] database and has verbalized understanding of the after visit summary and information contained wherein.    Follow up with a primary care provider. May go to ER for acute shortness of breath, lightheadedness, fever, or any other emergent complaints or changes in condition.    "This note will be shared with the patient"    The [employer] Dictation software was used during the dictation of portions or the entirety of this medical record.  Phonetic or grammatic errors may exist due to the use of this software. For clarification, refer to the author of the document.             "

## 2017-03-27 NOTE — PATIENT INSTRUCTIONS
Managing Dizziness (Vertigo) with Medicines    Although medicines can't cure your problem, they can help control symptoms. Your doctor may prescribe medicines for a few weeks and then taper them off. Always take your medicine as prescribed. Never share your medicine with others.  Contact your healthcare provider right away if you have side effects from your medicines.   How medicines can help  · Treat infection or inflammation. If you have an infection caused by bacteria, your doctor can prescribe antibiotics.  · Limit conflicting balance signals. These medicines are often in pill form.  · Ease nausea. Suppositories, pills, or shots can reduce vomiting.  · Reduce pressure in the canals. Diuretics can be used to treat Meniere's disease. These medicines help your body get rid of extra fluid.  · Ease other symptoms. Other medicines can help ease depression and anxiety caused by living with dizziness or fainting.  Date Last Reviewed: 11/1/2016  © 7414-1190 [employer]. [address] [place] [address], [place] [address]. All rights reserved. This information is not intended as a substitute for professional medical care. Always follow your healthcare professional's instructions.    Perform once every 15 min 3-4 times a day

## 2017-03-27 NOTE — MR AVS SNAPSHOT
[facility]  [address]  [place] [address]  Phone: [phone]  Fax: [phone]                  Ravi Zamorano   3/27/2017 5:30 PM   Office Visit    Description:  Male : 1955   Provider:  Hunter Ku DNP   Department:  [facility] - Internal Medicine           Reason for Visit     Dizziness           Diagnoses this Visit        Comments    Vertigo    -  Primary            To Do List           Future Appointments        Provider Department Dept Phone    3/27/2017 5:30 PM Hunter Ku DNP [facility] [phone]    2017 10:00 AM LAB, [facility] [phone]    2017 7:30 AM REYES Rendon, FNP [facility] [phone]    2017 8:35 AM LAB, [facility] [phone]    2017 8:55 AM SPECIMEN LAB, [facility]PL[facility] [phone]      Goals (5 Years of Data)     None       These Medications        Disp Refills Start End    meclizine (ANTIVERT) 25 mg tablet 21 tablet 0 3/27/2017     Take 1 tablet (25 mg total) by mouth 3 (three) times daily as needed. - Oral    Pharmacy: [address] Ph #: [phone]         [facility]     [facility] Care [facility] -  Assistance  Registered nurses in the [facility] provide clinical advisement, health education, appointment booking, and other advisory services.  Call for this free service at [phone].             Medications           Message regarding Medications     Verify the changes and/or additions to your medication regime listed below are the same as discussed with your clinician today.  If any of these changes or additions are incorrect, please notify your healthcare provider.        START taking these NEW medications        Refills    meclizine (ANTIVERT) 25 mg tablet 0    Sig: Take 1 tablet  (25 mg total) by mouth 3 (three) times daily as needed.    Class: Normal    Route: Oral           Verify that the below list of medications is an accurate representation of the medications you are currently taking.  If none reported, the list may be blank. If incorrect, please contact your healthcare provider. Carry this list with you in case of emergency.           Current Medications     aspirin 81 MG Chew Take 81 mg by mouth once daily.    atorvastatin (LIPITOR) 10 MG tablet Take 1 tablet (10 mg total) by mouth once daily.    blood sugar diagnostic Strp 1 strip by Misc.(Non-Drug; Combo Route) route 4 (four) times daily before meals and nightly. e11.65    blood-glucose meter (FREESTYLE SYSTEM KIT) kit Use as instructed e11.65 may substitute meter as covered by insurance    carvedilol (COREG) 12.5 MG tablet Take 12.5 mg by mouth 2 (two) times daily.    cholecalciferol, vitamin D3, 1,000 unit capsule Take 1 capsule (1,000 Units total) by mouth once daily.    docusate sodium (COLACE) 100 MG capsule Take 1 capsule (100 mg total) by mouth 2 (two) times daily as needed for Constipation.    famotidine (PEPCID) 20 MG tablet Take 1 tablet (20 mg total) by mouth every evening.    HARVONI  mg Tab Take 1 tablet by mouth once daily. Take at same time as Famotidine 20mg    insulin detemir (LEVEMIR FLEXTOUCH) 100 unit/mL (3 mL) SubQ InPn pen Inject 28 Units into the skin once daily.    insulin lispro (HUMALOG KWIKPEN) 100 unit/mL InPn pen Inject 16 units w/ meals plus scale as directed. 4 units w/ bedtime snack.    lancets Misc 1 each by Misc.(Non-Drug; Combo Route) route 4 (four) times daily before meals and nightly. e11.65    linagliptin (TRADJENTA) 5 mg Tab tablet Take 1 tablet (5 mg total) by mouth once daily.    magnesium oxide (MAG-OX) 400 mg tablet Take 1 tablet (400 mg total) by mouth 2 (two) times daily.    multivitamin (THERAGRAN) tablet Take 1 tablet by mouth once daily.    mycophenolate (CELLCEPT) 250 mg Cap  "Take 4 capsules (1,000 mg total) by mouth 2 (two) times daily.    nicotine (NICODERM CQ) 7 mg/24 hr Place 1 patch onto the skin once daily. NAME BRAND ONLY    nicotine polacrilex 2 MG Lozg TAKE 1 LOZENGE BY MOUTH AS NEEDED. (6-16 LOZENGES AS NEEDED)    nifedipine 30 MG ORAL TR24 (PROCARDIA-XL) 30 MG (OSM) 24 hr tablet Take 1 tablet (30 mg total) by mouth once daily.    ONETOUCH ULTRA TEST Strp     pen needle, diabetic 32 gauge x 5/32" Ndle 1 each by Misc.(Non-Drug; Combo Route) route 4 (four) times daily.    predniSONE (DELTASONE) 10 MG tablet Take 20 mg PO QD 11/8-12/7; 15 mg PO QD 12/8-1/7; 10 mg PO QD 1/8-2/7; then 5 mg PO QD thereafter    ropinirole (REQUIP) 0.5 MG tablet Take 0.5 mg by mouth once daily.    sulfamethoxazole-trimethoprim 400-80mg (BACTRIM,SEPTRA) 400-80 mg per tablet Take 1 tablet by mouth once daily. Stop: 11/5/17    tacrolimus (PROGRAF) 1 MG Cap Take 2 capsules (2 mg total) by mouth every 12 (twelve) hours. Z94.0 Kidney Transplant 11/5/2016    meclizine (ANTIVERT) 25 mg tablet Take 1 tablet (25 mg total) by mouth 3 (three) times daily as needed.    tamsulosin (FLOMAX) 0.4 mg Cp24 Take 1 capsule (0.4 mg total) by mouth after dinner.           Clinical Reference Information           Your Vitals Were     BP Pulse Temp Height Weight SpO2    160/80 65 97.7 °F (36.5 °C) 6' 1" (1.854 m) 86.6 kg (191 lb) 100%    BMI                25.2 kg/m2          Blood Pressure          Most Recent Value    BP  (!)  160/80      Allergies as of 3/27/2017     No Known Allergies      Immunizations Administered on Date of Encounter - 3/27/2017     None      Maintenance Dialysis History     Start End Type Comments Center    5/6/2015  [facility]            Current D[facility] Center Information     [facility] [address] Phone #:  [phone]    Contact:  [place]  [address] Fax #:  [phone]            Transplant Information        Txp Date Organ Coordinator Care Team    11/5/2016 Kidney " Kena Robles RN Current Nephrologist:  Alexi Glasgow MD   Referring Physician:  Alexi Glasgow MD   Surgeon:  Constantino Gillette MD   Assisting Surgeon:  MD Dylan Lorenzosnela Sign-Up     Activating your [facility] account is as easy as 1-2-3!     1) Visit [email or web address], select Sign Up Now, enter this activation code and your date of birth, then select Next.  [email or web address]  Expires: 3/30/2017  3:03 PM      2) Create a username and password to use when you visit [facility] in the future and select a security question in case you lose your password and select Next.    3) Enter your e-mail address and click Sign Up!    Additional Information  If you have questions, please e-mail [email or web address] or call [phone] to talk to our [facility] staff. Remember, [facility] is NOT to be used for urgent needs. For medical emergencies, dial 911.         Instructions      Managing Dizziness (Vertigo) with Medicines    Although medicines can't cure your problem, they can help control symptoms. Your doctor may prescribe medicines for a few weeks and then taper them off. Always take your medicine as prescribed. Never share your medicine with others.  Contact your healthcare provider right away if you have side effects from your medicines.   How medicines can help  · Treat infection or inflammation. If you have an infection caused by bacteria, your doctor can prescribe antibiotics.  · Limit conflicting balance signals. These medicines are often in pill form.  · Ease nausea. Suppositories, pills, or shots can reduce vomiting.  · Reduce pressure in the canals. Diuretics can be used to treat Meniere's disease. These medicines help your body get rid of extra fluid.  · Ease other symptoms. Other medicines can help ease depression and anxiety caused by living with dizziness or fainting.  Date Last Reviewed: 11/1/2016  © 1231-0275 [employer]. [address] [place] [address], [place]  [address]. All rights reserved. This information is not intended as a substitute for professional medical care. Always follow your healthcare professional's instructions.    Perform once every 15 min 3-4 times a day               Language Assistance Services     ATTENTION: Language assistance services are available, free of charge. Please call [phone].      ATENCIÓN: Si habla lul, tiene a moreno disposición servicios gratuitos de asistencia lingüística. Llame al [phone].     CHÚ Ý: N?u b?n nói Ti?ng Vi?t, có các d?ch v? h? tr? ngôn ng? mi?n phí dành cho b?n. G?i s? [phone].         [facility] complies with applicable Federal civil rights laws and does not discriminate on the basis of race, color, national origin, age, disability, or sex.

## 2017-04-04 RX ORDER — PEN NEEDLE, DIABETIC 31 GX5/16"
NEEDLE, DISPOSABLE MISCELLANEOUS
Qty: 100 EACH | Refills: 4 | OUTPATIENT
Start: 2017-04-04

## 2017-04-05 ENCOUNTER — LAB VISIT (OUTPATIENT)
Dept: LAB | Facility: HOSPITAL | Age: 62
End: 2017-04-05
Attending: INTERNAL MEDICINE
Payer: COMMERCIAL

## 2017-04-05 ENCOUNTER — EPISODE CHANGES (OUTPATIENT)
Dept: HEPATOLOGY | Facility: CLINIC | Age: 62
End: 2017-04-05

## 2017-04-05 ENCOUNTER — CLINICAL SUPPORT (OUTPATIENT)
Dept: SMOKING CESSATION | Facility: CLINIC | Age: 62
End: 2017-04-05
Payer: COMMERCIAL

## 2017-04-05 DIAGNOSIS — F17.200 NICOTINE DEPENDENCE: Primary | ICD-10-CM

## 2017-04-05 DIAGNOSIS — B18.2 CHRONIC HEPATITIS C WITHOUT HEPATIC COMA: ICD-10-CM

## 2017-04-05 DIAGNOSIS — Z94.0 KIDNEY REPLACED BY TRANSPLANT: ICD-10-CM

## 2017-04-05 LAB
ALBUMIN SERPL BCP-MCNC: 3.7 G/DL
ALBUMIN SERPL BCP-MCNC: 3.7 G/DL
ALP SERPL-CCNC: 67 U/L
ALT SERPL W/O P-5'-P-CCNC: 6 U/L
ANION GAP SERPL CALC-SCNC: 11 MMOL/L
ANION GAP SERPL CALC-SCNC: 11 MMOL/L
ANISOCYTOSIS BLD QL SMEAR: SLIGHT
AST SERPL-CCNC: 17 U/L
BASOPHILS # BLD AUTO: ABNORMAL K/UL
BASOPHILS NFR BLD: 1 %
BILIRUB SERPL-MCNC: 0.5 MG/DL
BUN SERPL-MCNC: 16 MG/DL
BUN SERPL-MCNC: 16 MG/DL
CALCIUM SERPL-MCNC: 9.7 MG/DL
CALCIUM SERPL-MCNC: 9.7 MG/DL
CHLORIDE SERPL-SCNC: 106 MMOL/L
CHLORIDE SERPL-SCNC: 106 MMOL/L
CO2 SERPL-SCNC: 23 MMOL/L
CO2 SERPL-SCNC: 23 MMOL/L
CREAT SERPL-MCNC: 1.5 MG/DL
CREAT SERPL-MCNC: 1.5 MG/DL
CREAT UR-MCNC: 120 MG/DL
DIFFERENTIAL METHOD: ABNORMAL
EOSINOPHIL # BLD AUTO: ABNORMAL K/UL
EOSINOPHIL NFR BLD: 3 %
ERYTHROCYTE [DISTWIDTH] IN BLOOD BY AUTOMATED COUNT: 14 %
EST. GFR  (AFRICAN AMERICAN): 58.1 ML/MIN/1.73 M^2
EST. GFR  (AFRICAN AMERICAN): 58.1 ML/MIN/1.73 M^2
EST. GFR  (NON AFRICAN AMERICAN): 50.2 ML/MIN/1.73 M^2
EST. GFR  (NON AFRICAN AMERICAN): 50.2 ML/MIN/1.73 M^2
GLUCOSE SERPL-MCNC: 243 MG/DL
GLUCOSE SERPL-MCNC: 243 MG/DL
HCT VFR BLD AUTO: 38.6 %
HGB BLD-MCNC: 12.1 G/DL
LYMPHOCYTES # BLD AUTO: ABNORMAL K/UL
LYMPHOCYTES NFR BLD: 31 %
MAGNESIUM SERPL-MCNC: 1.6 MG/DL
MCH RBC QN AUTO: 23.8 PG
MCHC RBC AUTO-ENTMCNC: 31.3 %
MCV RBC AUTO: 76 FL
MONOCYTES # BLD AUTO: ABNORMAL K/UL
MONOCYTES NFR BLD: 10 %
NEUTROPHILS NFR BLD: 54 %
NEUTS BAND NFR BLD MANUAL: 1 %
OVALOCYTES BLD QL SMEAR: ABNORMAL
PHOSPHATE SERPL-MCNC: 3.5 MG/DL
PLATELET # BLD AUTO: 163 K/UL
PMV BLD AUTO: ABNORMAL FL
POIKILOCYTOSIS BLD QL SMEAR: SLIGHT
POLYCHROMASIA BLD QL SMEAR: ABNORMAL
POTASSIUM SERPL-SCNC: 4.8 MMOL/L
POTASSIUM SERPL-SCNC: 4.8 MMOL/L
PROT SERPL-MCNC: 7.2 G/DL
PROT UR-MCNC: 12 MG/DL
PROT/CREAT RATIO, UR: 0.1
RBC # BLD AUTO: 5.09 M/UL
SODIUM SERPL-SCNC: 140 MMOL/L
SODIUM SERPL-SCNC: 140 MMOL/L
TACROLIMUS BLD-MCNC: 9.6 NG/ML
WBC # BLD AUTO: 2.82 K/UL

## 2017-04-05 PROCEDURE — 36415 COLL VENOUS BLD VENIPUNCTURE: CPT

## 2017-04-05 PROCEDURE — 87522 HEPATITIS C REVRS TRNSCRPJ: CPT

## 2017-04-05 PROCEDURE — 80053 COMPREHEN METABOLIC PANEL: CPT

## 2017-04-05 PROCEDURE — 87799 DETECT AGENT NOS DNA QUANT: CPT

## 2017-04-05 PROCEDURE — 99999 PR PBB SHADOW E&M-EST. PATIENT-LVL I: CPT | Mod: PBBFAC,,,

## 2017-04-05 PROCEDURE — 84100 ASSAY OF PHOSPHORUS: CPT

## 2017-04-05 PROCEDURE — 84156 ASSAY OF PROTEIN URINE: CPT

## 2017-04-05 PROCEDURE — 80197 ASSAY OF TACROLIMUS: CPT

## 2017-04-05 PROCEDURE — 90832 PSYTX W PT 30 MINUTES: CPT | Mod: S$GLB,,,

## 2017-04-05 PROCEDURE — 85027 COMPLETE CBC AUTOMATED: CPT

## 2017-04-05 PROCEDURE — 85007 BL SMEAR W/DIFF WBC COUNT: CPT

## 2017-04-05 PROCEDURE — 83735 ASSAY OF MAGNESIUM: CPT

## 2017-04-05 RX ORDER — NICOTINE 7MG/24HR
1 PATCH, TRANSDERMAL 24 HOURS TRANSDERMAL DAILY
Qty: 28 PATCH | Refills: 1 | Status: SHIPPED | OUTPATIENT
Start: 2017-04-05 | End: 2017-11-06 | Stop reason: ALTCHOICE

## 2017-04-05 RX ORDER — DM/P-EPHED/ACETAMINOPH/DOXYLAM 30-7.5/3
2 LIQUID (ML) ORAL
Qty: 81 LOZENGE | Refills: 1 | Status: SHIPPED | OUTPATIENT
Start: 2017-04-05 | End: 2017-11-03

## 2017-04-05 NOTE — PROGRESS NOTES
Individual Follow-Up Form    4/5/2017    Quit Date: 12/9/16    Clinical Status of Patient: Outpatient    Length of Service: 30mins    Continuing Medication: Yes    Other Medications: N/A     Target Symptoms: Withdrawal and medication side effects. The following were  rated moderate (3) to severe (4) on TCRS:  · Moderate (3): Increased Appetite  · Severe (4): N/A    Comments: Pt has not smoked since December 9th, down from a half a pack a day, Pt denies any urges or cravings, Pt's only reported symptom is increased appetite, which the Pt reports to be pleased w/, LPC praised Pt for progress made thus far, Pt has not smoked in almost four months, Pt has graduated from Smoking Cessation Program, LPC advised Pt to call LPC should he need anything.     Diagnosis: F17.200    Next Visit: MALI

## 2017-04-07 ENCOUNTER — OFFICE VISIT (OUTPATIENT)
Dept: ENDOCRINOLOGY | Facility: CLINIC | Age: 62
End: 2017-04-07
Payer: COMMERCIAL

## 2017-04-07 VITALS
DIASTOLIC BLOOD PRESSURE: 71 MMHG | HEIGHT: 73 IN | HEART RATE: 72 BPM | SYSTOLIC BLOOD PRESSURE: 123 MMHG | BODY MASS INDEX: 25.77 KG/M2 | WEIGHT: 194.44 LBS

## 2017-04-07 DIAGNOSIS — Z94.0 S/P KIDNEY TRANSPLANT: ICD-10-CM

## 2017-04-07 DIAGNOSIS — E11.65 UNCONTROLLED TYPE 2 DIABETES MELLITUS WITH DIABETIC NEPHROPATHY, UNSPECIFIED LONG TERM INSULIN USE STATUS: ICD-10-CM

## 2017-04-07 DIAGNOSIS — E11.42 DIABETIC POLYNEUROPATHY ASSOCIATED WITH TYPE 2 DIABETES MELLITUS: Primary | ICD-10-CM

## 2017-04-07 DIAGNOSIS — E78.5 HYPERLIPIDEMIA, UNSPECIFIED HYPERLIPIDEMIA TYPE: ICD-10-CM

## 2017-04-07 DIAGNOSIS — E11.21 UNCONTROLLED TYPE 2 DIABETES MELLITUS WITH DIABETIC NEPHROPATHY, UNSPECIFIED LONG TERM INSULIN USE STATUS: ICD-10-CM

## 2017-04-07 DIAGNOSIS — B18.2 HEP C W/O COMA, CHRONIC: ICD-10-CM

## 2017-04-07 DIAGNOSIS — Z79.60 LONG-TERM USE OF IMMUNOSUPPRESSANT MEDICATION: ICD-10-CM

## 2017-04-07 DIAGNOSIS — F17.200 SMOKER: ICD-10-CM

## 2017-04-07 DIAGNOSIS — N18.2 CKD (CHRONIC KIDNEY DISEASE) STAGE 2, GFR 60-89 ML/MIN: Chronic | ICD-10-CM

## 2017-04-07 DIAGNOSIS — F32.A DEPRESSION, UNSPECIFIED DEPRESSION TYPE: ICD-10-CM

## 2017-04-07 DIAGNOSIS — N25.81 HYPERPARATHYROIDISM DUE TO RENAL INSUFFICIENCY: ICD-10-CM

## 2017-04-07 DIAGNOSIS — T38.0X5D ADVERSE EFFECT OF GLUCOCORTICOID OR SYNTHETIC ANALOGUE, SUBSEQUENT ENCOUNTER: ICD-10-CM

## 2017-04-07 DIAGNOSIS — I50.9 CONGESTIVE HEART FAILURE, UNSPECIFIED CONGESTIVE HEART FAILURE CHRONICITY, UNSPECIFIED CONGESTIVE HEART FAILURE TYPE: ICD-10-CM

## 2017-04-07 PROCEDURE — 3045F PR MOST RECENT HEMOGLOBIN A1C LEVEL 7.0-9.0%: CPT | Mod: S$GLB,,, | Performed by: NURSE PRACTITIONER

## 2017-04-07 PROCEDURE — 99214 OFFICE O/P EST MOD 30 MIN: CPT | Mod: S$GLB,,, | Performed by: NURSE PRACTITIONER

## 2017-04-07 PROCEDURE — 99999 PR PBB SHADOW E&M-EST. PATIENT-LVL IV: CPT | Mod: PBBFAC,,, | Performed by: NURSE PRACTITIONER

## 2017-04-07 PROCEDURE — 3060F POS MICROALBUMINURIA REV: CPT | Mod: S$GLB,,, | Performed by: NURSE PRACTITIONER

## 2017-04-07 PROCEDURE — 1160F RVW MEDS BY RX/DR IN RCRD: CPT | Mod: S$GLB,,, | Performed by: NURSE PRACTITIONER

## 2017-04-07 RX ORDER — INSULIN LISPRO 100 [IU]/ML
INJECTION, SOLUTION INTRAVENOUS; SUBCUTANEOUS
Qty: 2 BOX | Refills: 11
Start: 2017-04-07 | End: 2018-05-07

## 2017-04-07 NOTE — PROGRESS NOTES
CC: Patient is here for management of Type 2 diabetes and review of medical conditions as listed in visit diagnosis.      HPI: Mr. Ravi Zamorano is a 61 y.o. Black or  male who was diagnosed with Type 2 DM in 2000.  Pt was last seen by JIL Baeza NP and other NPs in [facility].  Pt is being seen by me for the first time.  Lab Results   Component Value Date    HGBA1C 7.7 (H) 11/05/2016     Does not have recent a1c.       PREVIOUS DIABETES MEDICATIONS  Levemir, humalog, glipizide, metformin     CURRENT DIABETIC MEDS: levemir 32 units in am, humalog 16 units ac w/ mod dose 180/+2, tradjenta    Last Podiatry Exam: none     REVIEW OF SYSTEMS  General: no weakness, fatigue, or weight changes.   Eyes: no double or + blurred vision, eye pain, or redness; last eye exam=summer 2016.   Cardiovascular: no chest pain, palpitations, edema, or murmurs.   Respiratory: no cough or dyspnea.   GI: no heartburn, nausea, or changes in bowel patterns; good appetite.   Skin: no rashes, dryness, itching, or reactions at insulin injection sites.   Neuro: + numbness, tingling, tremors, or vertigo.   Endocrine: no polyuria, polydipsia, polyphagia, heat or cold intolerance.     Vital Signs  There were no vitals taken for this visit.    Hemoglobin A1C   Date Value Ref Range Status   11/05/2016 7.7 (H) 4.5 - 6.2 % Final     Comment:     According to ADA guidelines, hemoglobin A1C <7.0% represents  optimal control in non-pregnant diabetic patients.  Different  metrics may apply to specific populations.   Standards of Medical Care in Diabetes - 2016.  For the purpose of screening for the presence of diabetes:  <5.7%     Consistent with the absence of diabetes  5.7-6.4%  Consistent with increasing risk for diabetes   (prediabetes)  >or=6.5%  Consistent with diabetes  Currently no consensus exists for use of hemoglobin A1C  for diagnosis of diabetes for children.     10/23/2014 7.5 (H) 4.5 - 6.2 % Final   07/29/2014 7.2 (H)  4.5 - 6.2 % Final      Lab Results   Component Value Date    CREATININE 1.5 (H) 04/05/2017    CREATININE 1.5 (H) 04/05/2017      Lab Results   Component Value Date    TSH 0.94 02/08/2007      Lab Results   Component Value Date    LDLCALC 117.2 02/05/2017        PHYSICAL EXAMINATION  Constitutional: Appears well, no distress  Neck: Supple, trachea midline.   Respiratory: No wheezes, even and unlabored.  Cardiovascular: RRR; no carotid bruits or murmurs.   Lymph: DP pulses  2+ bilaterally; no edema.   Skin: warm and dry; no injection site reactions, no acanthosis nigracans observed.  Neuro:patient alert and cooperative, normal affect.    Diabetes Foot Exam:   Defer til next time       Assessment/Plan    1. Diabetic polyneuropathy associated with type 2 diabetes mellitus - DM uncontrolled,  a1c needed 5/1, a1c, tsh next time   F/u in 3-4 mos  D/c tradjenta, start trulicity 0.75 mg weekly, info given, reviewed MOA/SEs, humalog change to 18/16/16 w/ mod dose 150/+2, change levemir to qhs, 35 units, did not take levemir this am.  Have pt send logs in 2-3 weeks  Reviewed diet, carb book given     2. Uncontrolled type 2 diabetes mellitus with diabetic nephropathy, unspecified long term insulin use status -see above    3. S/P cadaveric kidney transplant 11/5/2016. ESRD secondary to HTN/DMII -f/u with [facility] q 6 mos    4. Long-term use of immunosuppressant medication -continue med, managed per [facility]   5. Hyperlipidemia, unspecified hyperlipidemia type -  Lab Results   Component Value Date    LDLCALC 117.2 02/05/2017     Near goal, continue lipitor   6. CKD (chronic kidney disease) stage 2, GFR 60-89 ml/min -see above    7. Congestive heart failure, unspecified congestive heart failure chronicity, unspecified congestive heart failure type -stable, f/u with cards, primary   8. Smoker -graduated from smoking cessation class on 4/6/17   9. Adverse effect of glucocorticoid or synthetic analogue, subsequent encounter -on pred, stable  dose, may increase insulin resistance   10. Hyperparathyroidism due to renal insufficiency -f/u with [facility]   11. Depression, unspecified depression type -managed per primary   12. Hep C w/o coma, chronic - diagnosed 2000 - on med, managed per hepatology, primary, finished harvoni treatment       FOLLOW UP  Return in about 3 months (around 7/7/2017).     Orders Placed This Encounter   Procedures    Hemoglobin A1c     Standing Status:   Future     Standing Expiration Date:   6/6/2018    Hemoglobin A1c     Standing Status:   Future     Standing Expiration Date:   6/6/2018    TSH     Standing Status:   Future     Standing Expiration Date:   6/6/2018

## 2017-04-07 NOTE — PATIENT INSTRUCTIONS
Snacks can be an important part of a balanced, healthy meal plan. They allow you to eat more frequently, feeling full and satisfied throughout the day. Also, they allow you to spread carbohydrates evenly, which may stabilize blood sugars.  Plus, snacks are enjoyable!     The amount of carbohydrate needed at snacks varies. Generally, about 15-30 grams of carbohydrate per snack is recommended.  Below you will find some tasty treats.       0-5 gm carb   Crystal Light   Vitamin Water Zero   Herbal tea, unsweetened   2 tsp peanut butter on celery   1./2 cup sugar-free jell-o   1 sugar-free popsicle   ¼ cup blueberries   8oz Blue Nancy unsweetened almond milk   5 baby carrots & celery sticks, cucumbers, bell peppers dipped in ¼ cup salsa, 2Tbsp light ranch dressing or 2Tbsp plain Greek yogurt   10 Goldfish crackers   ½ oz low-fat cheese or string cheese   1 closed handful of nuts, unsalted   1 Tbsp of sunflower seeds, unsalted   1 cup Smart Pop popcorn   1 whole grain brown rice cake        15 gm carb   1 small piece of fruit or ½ banana or 1/2 cup lite canned fruit   3 ismael cracker squares   3 cups Smart Pop popcorn, top spray butter, Rosales lite salt or cinnamon and Truvia   5 Vanilla Wafers   ½ cup low fat, no added sugar ice cream or frozen yogurt (Blue bell, Blue Bunny, Weight Watchers, Skinny Cow)   ½ turkey, ham, or chicken sandwich   ½ c fruit with ½ c Cottage cheese   4-6 unsalted wheat crackers with 1 oz low fat cheese or 1 tbsp peanut butter    30-45 goldfish crackers (depending on flavor)    7-8 Denominational mini brown rice cakes (caramel, apple cinnamon, chocolate)    12 Denominational mini brown rice cakes (cheddar, bbq, ranch)    1/3 cup hummus dip with raw veg   1/2 whole wheat stephanie, 1Tbsp hummus   Mini Pizza (1/2 whole wheat English muffin, low-fat  cheese, tomato sauce)   100 calorie snack pack (Oreo, Chips Ahoy, Ritz Mix, Baked Cheetos)   4-6 oz. light or Greek Style yogurt  (Baldo, Gia, Jose De Jesus, [facility] [place])   ½ cup sugar-free pudding     6 in. wheat tortilla or stephanie oven toasted chips (topped with spray butter flavoring, cinnamon, Truvia OR spray butter, garlic powder, chili powder)    18 BBQ Popchips (available at [employer], [employer], Fresh Market)                   Diabetes Support Group Meetings    Date Topics   February 9 Health Promotion/Nutrition   March 9 Taking Care of Your Kidneys   April 13 Taking Care of Your Feet   May 11 Ease Your Mind with Diabetes   June 8 Hurricane and Emergency Preparedness   July 13 Family & Caregiver Support for Diabetes   *August 17  Taking Care of Your Eyes   September 14 Devices & Technology   October 12 Recipes & Treats   November 9 Getting Pumped Up for Diabetes   December 14 Year-End Close Out            Meetings are held in the [facility] ([facility]) of the [facility] located at [address] [place] [address]. Please call [phone] for additional information.    Free service, offered every 2nd Thursday of every month, except in August! Family members and/or friends are welcome as well!  Support group is for patients with type 1 or type 2 diabetes.    From 3:30p to 4:30p        Linagliptin oral tablets  What is this medicine?  Linagliptin (melyssa a GLIP tin) helps to treat type 2 diabetes. It helps to control blood sugar. Treatment is combined with diet and exercise.  How should I use this medicine?  Take this medicine by mouth with a glass of water. Follow the directions on the prescription label. You can take it with or without food. Take your dose at the same time each day. Do not take more often than directed. Do not stop taking except on your doctor's advice.  A special MedGuide will be given to you by the pharmacist with each prescription and refill. Be sure to read this information carefully each time.  Talk to your pediatrician regarding the use of this medicine in children. Special  care may be needed.  What side effects may I notice from receiving this medicine?  Side effects that you should report to your doctor or health care professional as soon as possible:  · allergic reactions like skin rash, itching or hives, swelling of the face, lips, or tongue  · breathing problems  · fever, chills  · joint pain  · nausea, vomiting  · signs and symptoms of low blood sugar such as feeling anxious, confusion, dizziness, increased hunger, unusually weak or tired, sweating, shakiness, cold, irritable, headache, blurred vision, fast heartbeat, loss of consciousness  · unusual stomach pain or discomfort  · vomiting  Side effects that usually do not require medical attention (Report these to your doctor or health care professional if they continue or are bothersome.):  · headache  · sore throat  · stuffy or runny nose  What may interact with this medicine?  Do not take this medicine with any of the following medications:  · gatifloxacin  This medicine may also interact with the following medications:  · alcohol  · bosentan  · certain medicines for seizures like carbamazepine, phenobarbital, phenytoin  · rifabutin  · rifampin  · Kenton Wort  · sulfonylureas like glimepiride, glipizide, glyburide  What if I miss a dose?  If you miss a dose, take it as soon as you can. If it is almost time for your next dose, take only that dose. Do not take double or extra doses.  Where should I keep my medicine?  Keep out of the reach of children.  Store at room temperature between 15 and 30 degrees C (59 and 86 degrees F). Throw away any unused medicine after the expiration date.  What should I tell my health care provider before I take this medicine?  They need to know if you have any of these conditions:  · diabetic ketoacidosis  · type 1 diabetes  · an unusual or allergic reaction to linagliptin, other medicines, foods, dyes, or preservatives  · pregnant or trying to get pregnant  · breast-feeding  What should I watch  for while using this medicine?  Visit your doctor or health care professional for regular checks on your progress.  A test called the HbA1C (A1C) will be monitored. This is a simple blood test. It measures your blood sugar control over the last 2 to 3 months. You will receive this test every 3 to 6 months.  Learn how to check your blood sugar. Learn the symptoms of low and high blood sugar and how to manage them.  Always carry a quick-source of sugar with you in case you have symptoms of low blood sugar. Examples include hard sugar candy or glucose tablets. Make sure others know that you can choke if you eat or drink when you develop serious symptoms of low blood sugar, such as seizures or unconsciousness. They must get medical help at once.  Tell your doctor or health care professional if you have high blood sugar. You might need to change the dose of your medicine. If you are sick or exercising more than usual, you might need to change the dose of your medicine.  Do not skip meals. Ask your doctor or health care professional if you should avoid alcohol. Many nonprescription cough and cold products contain sugar or alcohol. These can affect blood sugar.  Wear a medical ID bracelet or chain, and carry a card that describes your disease and details of your medicine and dosage times.  Date Last Reviewed:   NOTE:This sheet is a summary. It may not cover all possible information. If you have questions about this medicine, talk to your doctor, pharmacist, or health care provider. Copyright© 2016 [employer]        Hypoglycemia (Low Blood Sugar)     Fast-acting sugar includes a cup of nonfat milk.     Too little sugar (glucose) in your blood is called hypoglycemia or low blood sugar. Low blood sugar usually means anything lower than 70 mg/dL. Talk with your healthcare provider about your target range and what level is too low for you. Diabetes itself doesnt cause low blood sugar. But some of the treatments for  diabetes, such as pills or insulin, may raise your risk for it. Low blood sugar may cause you to pass out or have a seizure. So always treat low blood sugar right away, but don't overeat.  Special note: Always carry a source of fast-acting sugar and a snack in case of hypoglycemia.   What you may notice  If you have low blood sugar, you may have one or more of these symptoms:  · Shakiness or dizziness  · Cold, clammy skin or sweating  · Feelings of hunger  · Headache  · Nervousness  · A hard, fast heartbeat  · Weakness  · Confusion or irritability  · Blurred vision  · Having nightmares or waking up confused or sweating  · Numbness or tingling in the lips or tongue  What you should do  Here are tips to follow if you have hypoglycemia:   · First check your blood sugar. If it is too low (out of your target range), eat or drink 15 to 20 grams of fast-acting sugar. This may be 3 to 4 glucose tablets, 4 ounces (half a cup) of fruit juice or regular (nondiet) soda, 8 ounces (1 cup) of fat-free milk, or 1 tablespoon of honey. Dont take more than this, or your blood sugar may go too high.  · Wait 15 minutes. Then recheck your blood sugar if you can.  · If your blood sugar is still too low, repeat the steps above and check your blood sugar again. If your blood sugar still has not returned to your target range, contact your healthcare provider or seek emergency care.  · Once your blood sugar returns to target range, eat a snack or meal.  Preventing low blood sugar  Things you can do include the following:   · If your condition needs a strict treatment plan, eat your meals and snacks at the same times each day. Dont skip meals!  · If your treatment plan lets you change when you eat and what you eat, learn how to change the time and dose of your rapid-acting insulin to match this.   · Ask your healthcare provider if it is safe for you to drink alcohol. Never drink on an empty stomach.  · Take your medicine at the prescribed  times.  · Always carry a source of fast-acting sugar and a snack when youre away from home.  Other things to do  Additional tips include the following:  · Carry a medical ID card, a compact USB drive, or wear a medical alert bracelet or necklace. It should say that you have diabetes. It should also say what to do if you pass out or have a seizure.  · Make sure your family, friends, and coworkers know the signs of low blood sugar. Tell them what to do if your blood sugar falls very low and you cant treat yourself.  · Keep a glucagon emergency kit handy. Be sure your family, friends, and coworkers know how and when to use it. Check it regularly and replace the glucagon before it expires.  · Talk with your health care team about other things you can do to prevent low blood sugar.     If you have unexplained hypoglycemia or hypoglycemia several times, call your healthcare provider.   Date Last Reviewed: 5/1/2016  © 5695-2801 [employer], [employer]. [address] [place] [address], [place] [address]. All rights reserved. This information is not intended as a substitute for professional medical care. Always follow your healthcare professional's instructions.

## 2017-04-07 NOTE — MR AVS SNAPSHOT
[facility]  [address]  [facility] [address]  Phone: [phone]                  Ravi Zamorano   2017 7:30 AM   Office Visit    Description:  Male : 1955   Provider:  REYES Rendon, SALOMEP   Department:  [facility]           Reason for Visit     Diabetes Mellitus           Diagnoses this Visit        Comments    Diabetic polyneuropathy associated with type 2 diabetes mellitus    -  Primary     Uncontrolled type 2 diabetes mellitus with diabetic nephropathy, unspecified long term insulin use status         S/P kidney transplant         Long-term use of immunosuppressant medication         Hyperlipidemia, unspecified hyperlipidemia type         CKD (chronic kidney disease) stage 2, GFR 60-89 ml/min         Congestive heart failure, unspecified congestive heart failure chronicity, unspecified congestive heart failure type         Smoker         Adverse effect of glucocorticoid or synthetic analogue, subsequent encounter         Hyperparathyroidism due to renal insufficiency         Depression, unspecified depression type         Hep C w/o coma, chronic                To Do List           Future Appointments        Provider Department Dept Phone    2017 8:35 AM LAB, TRANSPLANT [facility] [phone]    2017 8:55 AM SPECIMEN LAB, TRANSPLANT [facility] [phone]    5/10/2017 2:30 PM Sylvie Frias MD [facility] [phone]      Goals (5 Years of Data)     None      Follow-Up and Disposition     Return in about 3 months (around 2017).    Follow-up and Disposition History       These Medications        Disp Refills Start End    dulaglutide (TRULICITY) 0.75 mg/0.5 mL PnIj 4 Syringe 6 2017     Inject 0.5 mLs (0.75 mg total) into the skin every 7 days. Has free trial voucher/coupon, been on glipizide,metformin and tradjenta. - Subcutaneous    Pharmacy: [facility] [place]  [address] Ph #: [phone]       insulin lispro (HUMALOG KWIKPEN) 100 unit/mL InPn pen 2 Box 11 4/7/2017     Inject 18 units w/ breakfast, 16 units w/ lunch and dinner plus scale 150-200 +2, 201-250 +4, 251-300 +6, 301-350 +8.    Pharmacy: [facility] - [place], [place] - [address] Ph #: [phone]       insulin detemir (LEVEMIR FLEXTOUCH) 100 unit/mL (3 mL) SubQ InPn pen 1 Box 4 4/7/2017     Inject 35 units at night.    Pharmacy: [facility] - [place], [place] - [address] Ph #: [phone]         [facility]     [facility] Care [facility] - 24/7 Assistance  Unless otherwise directed by your provider, please contact [facility], our nurse care line that is available for 24/7 assistance.     Registered nurses in the [facility] provide: appointment scheduling, clinical advisement, health education, and other advisory services.  Call: [phone] (toll free)               Medications           Message regarding Medications     Verify the changes and/or additions to your medication regime listed below are the same as discussed with your clinician today.  If any of these changes or additions are incorrect, please notify your healthcare provider.        START taking these NEW medications        Refills    dulaglutide (TRULICITY) 0.75 mg/0.5 mL PnIj 6    Sig: Inject 0.5 mLs (0.75 mg total) into the skin every 7 days. Has free trial voucher/coupon, been on glipizide,metformin and tradjenta.    Class: Normal    Route: Subcutaneous      CHANGE how you are taking these medications     Start Taking Instead of    insulin lispro (HUMALOG KWIKPEN) 100 unit/mL InPn pen insulin lispro (HUMALOG KWIKPEN) 100 unit/mL InPn pen    Dosage:  Inject 18 units w/ breakfast, 16 units w/ lunch and dinner plus scale 150-200 +2, 201-250 +4, 251-300 +6, 301-350 +8. Dosage:  Inject 16 units w/ meals plus  scale as directed. 4 units w/ bedtime snack.    Reason for Change:  Reorder     insulin detemir (LEVEMIR FLEXTOUCH) 100 unit/mL (3 mL) SubQ InPn pen insulin detemir (LEVEMIR FLEXTOUCH) 100 unit/mL (3 mL) SubQ InPn pen    Dosage:  Inject 35 units at night. Dosage:  Inject 28 Units into the skin once daily.    Reason for Change:  Reorder       STOP taking these medications     HARVONI  mg Tab Take 1 tablet by mouth once daily. Take at same time as Famotidine 20mg    linagliptin (TRADJENTA) 5 mg Tab tablet Take 1 tablet (5 mg total) by mouth once daily.           Verify that the below list of medications is an accurate representation of the medications you are currently taking.  If none reported, the list may be blank. If incorrect, please contact your healthcare provider. Carry this list with you in case of emergency.           Current Medications     aspirin 81 MG Chew Take 81 mg by mouth once daily.    atorvastatin (LIPITOR) 10 MG tablet Take 1 tablet (10 mg total) by mouth once daily.    blood sugar diagnostic Strp 1 strip by Misc.(Non-Drug; Combo Route) route 4 (four) times daily before meals and nightly. e11.65    blood-glucose meter (FREESTYLE SYSTEM KIT) kit Use as instructed e11.65 may substitute meter as covered by insurance    carvedilol (COREG) 12.5 MG tablet Take 12.5 mg by mouth 2 (two) times daily.    cholecalciferol, vitamin D3, 1,000 unit capsule Take 1 capsule (1,000 Units total) by mouth once daily.    docusate sodium (COLACE) 100 MG capsule Take 1 capsule (100 mg total) by mouth 2 (two) times daily as needed for Constipation.    insulin detemir (LEVEMIR FLEXTOUCH) 100 unit/mL (3 mL) SubQ InPn pen Inject 35 units at night.    insulin lispro (HUMALOG KWIKPEN) 100 unit/mL InPn pen Inject 18 units w/ breakfast, 16 units w/ lunch and dinner plus scale 150-200 +2, 201-250 +4, 251-300 +6, 301-350 +8.    lancets Misc 1 each by Misc.(Non-Drug; Combo Route) route 4 (four) times daily before meals and  "nightly. e11.65    magnesium oxide (MAG-OX) 400 mg tablet Take 1 tablet (400 mg total) by mouth 2 (two) times daily.    meclizine (ANTIVERT) 25 mg tablet Take 1 tablet (25 mg total) by mouth 3 (three) times daily as needed.    multivitamin (THERAGRAN) tablet Take 1 tablet by mouth once daily.    mycophenolate (CELLCEPT) 250 mg Cap Take 4 capsules (1,000 mg total) by mouth 2 (two) times daily.    nicotine (NICODERM CQ) 7 mg/24 hr Place 1 patch onto the skin once daily. NAME BRAND ONLY    nicotine polacrilex 2 MG Lozg TAKE 1 LOZENGE BY MOUTH AS NEEDED. (6-16 LOZENGES AS NEEDED)    nifedipine 30 MG ORAL TR24 (PROCARDIA-XL) 30 MG (OSM) 24 hr tablet Take 1 tablet (30 mg total) by mouth once daily.    ONETOUCH ULTRA TEST Strp     pen needle, diabetic 32 gauge x 5/32" Ndle 1 each by Misc.(Non-Drug; Combo Route) route 4 (four) times daily.    predniSONE (DELTASONE) 10 MG tablet Take 20 mg PO QD 11/8-12/7; 15 mg PO QD 12/8-1/7; 10 mg PO QD 1/8-2/7; then 5 mg PO QD thereafter    ropinirole (REQUIP) 0.5 MG tablet Take 0.5 mg by mouth once daily.    sulfamethoxazole-trimethoprim 400-80mg (BACTRIM,SEPTRA) 400-80 mg per tablet Take 1 tablet by mouth once daily. Stop: 11/5/17    tacrolimus (PROGRAF) 1 MG Cap Take 2 capsules (2 mg total) by mouth every 12 (twelve) hours. Z94.0 Kidney Transplant 11/5/2016    dulaglutide (TRULICITY) 0.75 mg/0.5 mL PnIj Inject 0.5 mLs (0.75 mg total) into the skin every 7 days. Has free trial voucher/coupon, been on glipizide,metformin and tradjenta.    famotidine (PEPCID) 20 MG tablet Take 1 tablet (20 mg total) by mouth every evening.    nicotine polacrilex 2 MG Lozg Take 1 lozenge (2 mg total) by mouth as needed (6-16 lozenges daily as needed). MINT    tamsulosin (FLOMAX) 0.4 mg Cp24 Take 1 capsule (0.4 mg total) by mouth after dinner.           Clinical Reference Information           Your Vitals Were     BP Pulse Height Weight BMI    123/71 (BP Location: Right arm, Patient Position: Sitting) 72 " "6' 1" (1.854 m) 88.2 kg (194 lb 7.1 oz) 25.65 kg/m2      Blood Pressure          Most Recent Value    BP  123/71      Allergies as of 4/7/2017     No Known Allergies      Immunizations Administered on Date of Encounter - 4/7/2017     None      Orders Placed During Today's Visit     Future Labs/Procedures Expected by Expires    Hemoglobin A1c  4/7/2017 6/6/2018    Hemoglobin A1c  4/7/2017 6/6/2018    TSH  4/7/2017 6/6/2018      Maintenance Dialysis History     Start End Type Comments Center    5/6/2015  [facility]            Current D[facility] Center Information     [facility] [address] Phone #:  [phone]    Contact:  [place]  [address] Fax #:  [phone]            Transplant Information        Txp Date Organ Coordinator Care Team    11/5/2016 Kidney Kena Robles RN Current Nephrologist:  Alexi Glasgow MD   Referring Physician:  Alexi Glasgow MD   Surgeon:  Constantino Gillette MD   Assisting Surgeon:  Shayy Angeles MD         Instructions    Snacks can be an important part of a balanced, healthy meal plan. They allow you to eat more frequently, feeling full and satisfied throughout the day. Also, they allow you to spread carbohydrates evenly, which may stabilize blood sugars.  Plus, snacks are enjoyable!     The amount of carbohydrate needed at snacks varies. Generally, about 15-30 grams of carbohydrate per snack is recommended.  Below you will find some tasty treats.       0-5 gm carb   Crystal Light   Vitamin Water Zero   Herbal tea, unsweetened   2 tsp peanut butter on celery   1./2 cup sugar-free jell-o   1 sugar-free popsicle   ¼ cup blueberries   8oz Blue Nancy unsweetened almond milk   5 baby carrots & celery sticks, cucumbers, bell peppers dipped in ¼ cup salsa, 2Tbsp light ranch dressing or 2Tbsp plain Greek yogurt   10 Goldfish crackers   ½ oz low-fat cheese or string cheese   1 closed handful of nuts, unsalted   1 Tbsp of sunflower " seeds, unsalted   1 cup Smart Pop popcorn   1 whole grain brown rice cake        15 gm carb   1 small piece of fruit or ½ banana or 1/2 cup lite canned fruit   3 ismael cracker squares   3 cups Smart Pop popcorn, top spray butter, Rosales lite salt or cinnamon and Truvia   5 Vanilla Wafers   ½ cup low fat, no added sugar ice cream or frozen yogurt (Blue bell, Blue Bunny, Weight Watchers, Skinny Cow)   ½ turkey, ham, or chicken sandwich   ½ c fruit with ½ c Cottage cheese   4-6 unsalted wheat crackers with 1 oz low fat cheese or 1 tbsp peanut butter    30-45 goldfish crackers (depending on flavor)    7-8 Yazidi mini brown rice cakes (caramel, apple cinnamon, chocolate)    12 Yazidi mini brown rice cakes (cheddar, bbq, ranch)    1/3 cup hummus dip with raw veg   1/2 whole wheat stephanie, 1Tbsp hummus   Mini Pizza (1/2 whole wheat English muffin, low-fat  cheese, tomato sauce)   100 calorie snack pack (Oreo, Chips Ahoy, Ritz Mix, Baked Cheetos)   4-6 oz. light or Greek Style yogurt (Chobani, Yoplait, Okios, Stoneyfield)   ½ cup sugar-free pudding     6 in. wheat tortilla or stephanie oven toasted chips (topped with spray butter flavoring, cinnamon, Truvia OR spray butter, garlic powder, chili powder)    18 BBQ Popchips (available at Target, Whole Foods, Fresh Market)                   Diabetes Support Group Meetings    Date Topics   February 9 Health Promotion/Nutrition   March 9 Taking Care of Your Kidneys   April 13 Taking Care of Your Feet   May 11 Ease Your Mind with Diabetes   June 8 Hurricane and Emergency Preparedness   July 13 Family & Caregiver Support for Diabetes   *August 17  Taking Care of Your Eyes   September 14 Devices & Technology   October 12 Recipes & Treats   November 9 Getting Pumped Up for Diabetes   December 14 Year-End Close Out            Meetings are held in the [facility] (A) of the [facility] located at [address] [place] [address]  [phone]. Please call [phone] for additional information.    Free service, offered every 2nd Thursday of every month, except in August! Family members and/or friends are welcome as well!  Support group is for patients with type 1 or type 2 diabetes.    From 3:30p to 4:30p        Linagliptin oral tablets  What is this medicine?  Linagliptin (melyssa a GLIP tin) helps to treat type 2 diabetes. It helps to control blood sugar. Treatment is combined with diet and exercise.  How should I use this medicine?  Take this medicine by mouth with a glass of water. Follow the directions on the prescription label. You can take it with or without food. Take your dose at the same time each day. Do not take more often than directed. Do not stop taking except on your doctor's advice.  A special MedGuide will be given to you by the pharmacist with each prescription and refill. Be sure to read this information carefully each time.  Talk to your pediatrician regarding the use of this medicine in children. Special care may be needed.  What side effects may I notice from receiving this medicine?  Side effects that you should report to your doctor or health care professional as soon as possible:  · allergic reactions like skin rash, itching or hives, swelling of the face, lips, or tongue  · breathing problems  · fever, chills  · joint pain  · nausea, vomiting  · signs and symptoms of low blood sugar such as feeling anxious, confusion, dizziness, increased hunger, unusually weak or tired, sweating, shakiness, cold, irritable, headache, blurred vision, fast heartbeat, loss of consciousness  · unusual stomach pain or discomfort  · vomiting  Side effects that usually do not require medical attention (Report these to your doctor or health care professional if they continue or are bothersome.):  · headache  · sore throat  · stuffy or runny nose  What may interact with this medicine?  Do not take this medicine with any of the following  medications:  · gatifloxacin  This medicine may also interact with the following medications:  · alcohol  · bosentan  · certain medicines for seizures like carbamazepine, phenobarbital, phenytoin  · rifabutin  · rifampin  · Dixie Wort  · sulfonylureas like glimepiride, glipizide, glyburide  What if I miss a dose?  If you miss a dose, take it as soon as you can. If it is almost time for your next dose, take only that dose. Do not take double or extra doses.  Where should I keep my medicine?  Keep out of the reach of children.  Store at room temperature between 15 and 30 degrees C (59 and 86 degrees F). Throw away any unused medicine after the expiration date.  What should I tell my health care provider before I take this medicine?  They need to know if you have any of these conditions:  · diabetic ketoacidosis  · type 1 diabetes  · an unusual or allergic reaction to linagliptin, other medicines, foods, dyes, or preservatives  · pregnant or trying to get pregnant  · breast-feeding  What should I watch for while using this medicine?  Visit your doctor or health care professional for regular checks on your progress.  A test called the HbA1C (A1C) will be monitored. This is a simple blood test. It measures your blood sugar control over the last 2 to 3 months. You will receive this test every 3 to 6 months.  Learn how to check your blood sugar. Learn the symptoms of low and high blood sugar and how to manage them.  Always carry a quick-source of sugar with you in case you have symptoms of low blood sugar. Examples include hard sugar candy or glucose tablets. Make sure others know that you can choke if you eat or drink when you develop serious symptoms of low blood sugar, such as seizures or unconsciousness. They must get medical help at once.  Tell your doctor or health care professional if you have high blood sugar. You might need to change the dose of your medicine. If you are sick or exercising more than usual, you  might need to change the dose of your medicine.  Do not skip meals. Ask your doctor or health care professional if you should avoid alcohol. Many nonprescription cough and cold products contain sugar or alcohol. These can affect blood sugar.  Wear a medical ID bracelet or chain, and carry a card that describes your disease and details of your medicine and dosage times.  Date Last Reviewed:   NOTE:This sheet is a summary. It may not cover all possible information. If you have questions about this medicine, talk to your doctor, pharmacist, or health care provider. Copyright© 2016 [employer] Standard        Hypoglycemia (Low Blood Sugar)     Fast-acting sugar includes a cup of nonfat milk.     Too little sugar (glucose) in your blood is called hypoglycemia or low blood sugar. Low blood sugar usually means anything lower than 70 mg/dL. Talk with your healthcare provider about your target range and what level is too low for you. Diabetes itself doesnt cause low blood sugar. But some of the treatments for diabetes, such as pills or insulin, may raise your risk for it. Low blood sugar may cause you to pass out or have a seizure. So always treat low blood sugar right away, but don't overeat.  Special note: Always carry a source of fast-acting sugar and a snack in case of hypoglycemia.   What you may notice  If you have low blood sugar, you may have one or more of these symptoms:  · Shakiness or dizziness  · Cold, clammy skin or sweating  · Feelings of hunger  · Headache  · Nervousness  · A hard, fast heartbeat  · Weakness  · Confusion or irritability  · Blurred vision  · Having nightmares or waking up confused or sweating  · Numbness or tingling in the lips or tongue  What you should do  Here are tips to follow if you have hypoglycemia:   · First check your blood sugar. If it is too low (out of your target range), eat or drink 15 to 20 grams of fast-acting sugar. This may be 3 to 4 glucose tablets, 4 ounces (half a cup) of fruit  juice or regular (nondiet) soda, 8 ounces (1 cup) of fat-free milk, or 1 tablespoon of honey. Dont take more than this, or your blood sugar may go too high.  · Wait 15 minutes. Then recheck your blood sugar if you can.  · If your blood sugar is still too low, repeat the steps above and check your blood sugar again. If your blood sugar still has not returned to your target range, contact your healthcare provider or seek emergency care.  · Once your blood sugar returns to target range, eat a snack or meal.  Preventing low blood sugar  Things you can do include the following:   · If your condition needs a strict treatment plan, eat your meals and snacks at the same times each day. Dont skip meals!  · If your treatment plan lets you change when you eat and what you eat, learn how to change the time and dose of your rapid-acting insulin to match this.   · Ask your healthcare provider if it is safe for you to drink alcohol. Never drink on an empty stomach.  · Take your medicine at the prescribed times.  · Always carry a source of fast-acting sugar and a snack when youre away from home.  Other things to do  Additional tips include the following:  · Carry a medical ID card, a compact USB drive, or wear a medical alert bracelet or necklace. It should say that you have diabetes. It should also say what to do if you pass out or have a seizure.  · Make sure your family, friends, and coworkers know the signs of low blood sugar. Tell them what to do if your blood sugar falls very low and you cant treat yourself.  · Keep a glucagon emergency kit handy. Be sure your family, friends, and coworkers know how and when to use it. Check it regularly and replace the glucagon before it expires.  · Talk with your health care team about other things you can do to prevent low blood sugar.     If you have unexplained hypoglycemia or hypoglycemia several times, call your healthcare provider.   Date Last Reviewed: 5/1/2016  © 7007-0777 The  [employer]. [address] [place] [address], [place] [address]. All rights reserved. This information is not intended as a substitute for professional medical care. Always follow your healthcare professional's instructions.             Language Assistance Services     ATTENTION: Language assistance services are available, free of charge. Please call [phone].      ATENCIÓN: Si habla español, tiene a moreno disposición servicios gratuitos de asistencia lingüística. Llame al [phone].     CHÚ Ý: N?u b?n nói Ti?ng Vi?t, có các d?ch v? h? tr? ngôn ng? mi?n phí dành cho b?n. G?i s? [phone].         [facility] complies with applicable Federal civil rights laws and does not discriminate on the basis of race, color, national origin, age, disability, or sex.

## 2017-04-08 LAB
BK VIRUS DNA PCR, QUANT, BLOOD: <250 COPIES/ML
BK VIRUS DNA, BLOOD: NOT DETECTED
HCV LOG: <1.08 LOG (10) IU/ML
HCV RNA QUANT PCR: <12 IU/ML
HCV, QUALITATIVE: NOT DETECTED IU/ML
LOG BKV COPIES/ML: <2.4 LOG (10) COPIES/ML

## 2017-04-10 ENCOUNTER — TELEPHONE (OUTPATIENT)
Dept: HEPATOLOGY | Facility: CLINIC | Age: 62
End: 2017-04-10

## 2017-04-10 DIAGNOSIS — B18.2 CHRONIC HEPATITIS C WITHOUT HEPATIC COMA: Primary | ICD-10-CM

## 2017-04-10 NOTE — TELEPHONE ENCOUNTER
HCV LAB REVIEW  Week 12 of Harvoni,  END OF TREATMENT  Post kidney transplant  Nciole 1a, tx naive, F0-1    Pertinent labs:  4/5/17  CMP stable  HCV neg      pls call pt:  Labs look good. Liver enzymes normal. HCV is negative  Patient should be finishing HCV treatment any day now.   There is a small chance the virus can return. We will monitor blood for this.        Next labs due - pls schedule  - CMP, HCV RNA - SVR12 - 6/28

## 2017-04-13 DIAGNOSIS — E11.42 TYPE 2 DIABETES MELLITUS WITH POLYNEUROPATHY: Primary | ICD-10-CM

## 2017-04-13 RX ORDER — PEN NEEDLE, DIABETIC 31 GX5/16"
NEEDLE, DISPOSABLE MISCELLANEOUS
Qty: 100 EACH | Refills: 4 | OUTPATIENT
Start: 2017-04-13

## 2017-04-18 ENCOUNTER — TELEPHONE (OUTPATIENT)
Dept: ENDOCRINOLOGY | Facility: CLINIC | Age: 62
End: 2017-04-18

## 2017-04-18 RX ORDER — PEN NEEDLE, DIABETIC 31 GX5/16"
NEEDLE, DISPOSABLE MISCELLANEOUS
Qty: 100 EACH | Refills: 4 | OUTPATIENT
Start: 2017-04-18

## 2017-04-18 NOTE — TELEPHONE ENCOUNTER
"----- Message from Lanie Ballard sent at 4/18/2017 12:44 PM CDT -----  Contact: Richard with [facility]  [facility] is requesting a refill on pen needle, diabetic 32 gauge x 5/32" Ndle    Please send as soon as possible pt is there waiting    Contact ext [phone]   Thanks   "

## 2017-05-01 ENCOUNTER — LAB VISIT (OUTPATIENT)
Dept: LAB | Facility: HOSPITAL | Age: 62
End: 2017-05-01
Attending: INTERNAL MEDICINE
Payer: COMMERCIAL

## 2017-05-01 ENCOUNTER — TELEPHONE (OUTPATIENT)
Dept: ENDOCRINOLOGY | Facility: CLINIC | Age: 62
End: 2017-05-01

## 2017-05-01 DIAGNOSIS — Z94.0 KIDNEY REPLACED BY TRANSPLANT: ICD-10-CM

## 2017-05-01 LAB
ALBUMIN SERPL BCP-MCNC: 3.9 G/DL
ALBUMIN SERPL BCP-MCNC: 3.9 G/DL
ALP SERPL-CCNC: 74 U/L
ALT SERPL W/O P-5'-P-CCNC: 7 U/L
ANION GAP SERPL CALC-SCNC: 8 MMOL/L
ANISOCYTOSIS BLD QL SMEAR: SLIGHT
AST SERPL-CCNC: 16 U/L
BACTERIA #/AREA URNS AUTO: ABNORMAL /HPF
BASOPHILS NFR BLD: 2 %
BILIRUB DIRECT SERPL-MCNC: 0.2 MG/DL
BILIRUB SERPL-MCNC: 0.4 MG/DL
BILIRUB UR QL STRIP: NEGATIVE
BUN SERPL-MCNC: 12 MG/DL
BURR CELLS BLD QL SMEAR: ABNORMAL
CALCIUM SERPL-MCNC: 9.9 MG/DL
CHLORIDE SERPL-SCNC: 106 MMOL/L
CLARITY UR REFRACT.AUTO: ABNORMAL
CO2 SERPL-SCNC: 26 MMOL/L
COLOR UR AUTO: YELLOW
CREAT SERPL-MCNC: 1.4 MG/DL
CREAT UR-MCNC: 141 MG/DL
DIFFERENTIAL METHOD: ABNORMAL
EOSINOPHIL NFR BLD: 5 %
ERYTHROCYTE [DISTWIDTH] IN BLOOD BY AUTOMATED COUNT: 14.4 %
EST. GFR  (AFRICAN AMERICAN): >60 ML/MIN/1.73 M^2
EST. GFR  (NON AFRICAN AMERICAN): 54.6 ML/MIN/1.73 M^2
GIANT PLATELETS BLD QL SMEAR: PRESENT
GLUCOSE SERPL-MCNC: 192 MG/DL
GLUCOSE UR QL STRIP: NEGATIVE
HCT VFR BLD AUTO: 40.2 %
HGB BLD-MCNC: 12.6 G/DL
HGB UR QL STRIP: NEGATIVE
HYALINE CASTS UR QL AUTO: 9 /LPF
HYPOCHROMIA BLD QL SMEAR: ABNORMAL
KETONES UR QL STRIP: NEGATIVE
LEUKOCYTE ESTERASE UR QL STRIP: NEGATIVE
LYMPHOCYTES NFR BLD: 24 %
MAGNESIUM SERPL-MCNC: 1.6 MG/DL
MCH RBC QN AUTO: 23.6 PG
MCHC RBC AUTO-ENTMCNC: 31.3 %
MCV RBC AUTO: 75 FL
MICROSCOPIC COMMENT: ABNORMAL
MONOCYTES NFR BLD: 16 %
NEUTROPHILS NFR BLD: 53 %
NITRITE UR QL STRIP: NEGATIVE
OVALOCYTES BLD QL SMEAR: ABNORMAL
PH UR STRIP: 6 [PH] (ref 5–8)
PHOSPHATE SERPL-MCNC: 3 MG/DL
PLATELET # BLD AUTO: 152 K/UL
PLATELET BLD QL SMEAR: ABNORMAL
PMV BLD AUTO: ABNORMAL FL
POIKILOCYTOSIS BLD QL SMEAR: SLIGHT
POLYCHROMASIA BLD QL SMEAR: ABNORMAL
POTASSIUM SERPL-SCNC: 5.3 MMOL/L
PROT SERPL-MCNC: 7.6 G/DL
PROT UR QL STRIP: ABNORMAL
PROT UR-MCNC: 107 MG/DL
PROT/CREAT RATIO, UR: 0.76
RBC # BLD AUTO: 5.34 M/UL
RBC #/AREA URNS AUTO: 1 /HPF (ref 0–4)
SODIUM SERPL-SCNC: 140 MMOL/L
SP GR UR STRIP: 1.01 (ref 1–1.03)
TACROLIMUS BLD-MCNC: 8.8 NG/ML
URN SPEC COLLECT METH UR: ABNORMAL
UROBILINOGEN UR STRIP-ACNC: NEGATIVE EU/DL
WBC # BLD AUTO: 2.56 K/UL
WBC #/AREA URNS AUTO: 2 /HPF (ref 0–5)

## 2017-05-01 PROCEDURE — 80069 RENAL FUNCTION PANEL: CPT

## 2017-05-01 PROCEDURE — 80197 ASSAY OF TACROLIMUS: CPT

## 2017-05-01 PROCEDURE — 84156 ASSAY OF PROTEIN URINE: CPT

## 2017-05-01 PROCEDURE — 81001 URINALYSIS AUTO W/SCOPE: CPT

## 2017-05-01 PROCEDURE — 85027 COMPLETE CBC AUTOMATED: CPT

## 2017-05-01 PROCEDURE — 87799 DETECT AGENT NOS DNA QUANT: CPT

## 2017-05-01 PROCEDURE — 83735 ASSAY OF MAGNESIUM: CPT

## 2017-05-01 PROCEDURE — 36415 COLL VENOUS BLD VENIPUNCTURE: CPT

## 2017-05-01 PROCEDURE — 84075 ASSAY ALKALINE PHOSPHATASE: CPT

## 2017-05-01 PROCEDURE — 85007 BL SMEAR W/DIFF WBC COUNT: CPT

## 2017-05-01 NOTE — TELEPHONE ENCOUNTER
Spoke with the pt and pt says he have a numbness and pain in  his feet every night and next day he woke up that pain goes away. Pt dont have any problem in the morning but pain back up  in evening. Pt is not taking any medicine right now for his pain but he need something for his relieve pain. Please advise.

## 2017-05-01 NOTE — TELEPHONE ENCOUNTER
----- Message from Lyla Ramos sent at 5/1/2017 10:56 AM CDT -----  Contact: patient  [phone]  [place]   -   Patient wants to know if there is something that he can take from having numbness and short pain in his right foot .  Please call the pt back with advice   Thanks,

## 2017-05-01 NOTE — PROGRESS NOTES
Mr. Zamorano has an appt on 5/10... He reported that he has pain over his kidney, but states only at night.  No other s/s at this time.

## 2017-05-02 ENCOUNTER — TELEPHONE (OUTPATIENT)
Dept: TRANSPLANT | Facility: CLINIC | Age: 62
End: 2017-05-02

## 2017-05-02 NOTE — TELEPHONE ENCOUNTER
Patient contacted, advised of MD review of labs.  No changed indicated at this time, reviewed plan for next labs and appt.  Patient verbalized understanding.     Patient advised that WBC low, encouraged proper hand hygiene, encouraged patient to avoid large crowds or anyone that is ill, reiterated eating only cooked foods. Encouraged reporting of any s/s or fever.  Patient reporting that he has a runny nose sometimes, denies other s/s, no sore throat, no coughing, no burning with urination.  Patient did c/o pain in area of kidney off and on, mostly at night.    All questions answered, patient will report s/s to transplant coordinator.   Message sent to MD for review.

## 2017-05-02 NOTE — TELEPHONE ENCOUNTER
Spoke with the pt regards Lyrica and pt says it is not covered in his insurance and too much expensive for him. Pt will call to the transplant dept.

## 2017-05-02 NOTE — TELEPHONE ENCOUNTER
Spoke with the pt that he was on Gabapentin but after transplant he took off .  Pt dont have any side effects with gabapentin but he dont want a go with Gabapentin. Requip he is taking every day but it  is not really help him out. Please advise.

## 2017-05-02 NOTE — TELEPHONE ENCOUNTER
----- Message from Sylvie Frias MD sent at 5/2/2017  1:37 PM CDT -----  Available results reviewed and require no immediate action.

## 2017-05-03 ENCOUNTER — TELEPHONE (OUTPATIENT)
Dept: TRANSPLANT | Facility: CLINIC | Age: 62
End: 2017-05-03

## 2017-05-03 DIAGNOSIS — Z94.0 KIDNEY REPLACED BY TRANSPLANT: Primary | ICD-10-CM

## 2017-05-03 NOTE — TELEPHONE ENCOUNTER
----- Message from Sylvie Frias MD sent at 5/3/2017 12:08 PM CDT -----  UPC  Increased --> repeat UA and UPC

## 2017-05-03 NOTE — TELEPHONE ENCOUNTER
Pt contacted and advised of MD's lab review and instructions.  Pt. repeated instructions and verbalized understanding.     Reviewed appts for US on Monday and repeat urine, patient wishes to reschedule office visit to Monday.  Appt for Monday @ 3:30 pm..

## 2017-05-05 ENCOUNTER — TELEPHONE (OUTPATIENT)
Dept: ENDOCRINOLOGY | Facility: CLINIC | Age: 62
End: 2017-05-05

## 2017-05-05 RX ORDER — BLOOD SUGAR DIAGNOSTIC
1 STRIP MISCELLANEOUS 4 TIMES DAILY
Qty: 200 EACH | Refills: 6 | Status: SHIPPED | OUTPATIENT
Start: 2017-05-05 | End: 2018-05-31

## 2017-05-05 RX ORDER — GABAPENTIN 300 MG/1
300 CAPSULE ORAL 3 TIMES DAILY
Qty: 90 CAPSULE | Refills: 4 | Status: SHIPPED | OUTPATIENT
Start: 2017-05-05 | End: 2017-10-18 | Stop reason: SDUPTHER

## 2017-05-05 NOTE — TELEPHONE ENCOUNTER
----- Message from Aster Kolb sent at 5/5/2017 10:47 AM CDT -----  Contact: Self [phone]  Pt is requesting a prescription refill for ONETOUCH ULTRA TEST Strp and a prescription for Gabapentin.     [facility] - [place], [place] - [address]   [phone] (Phone)  [phone] (Fax)

## 2017-05-08 ENCOUNTER — TELEPHONE (OUTPATIENT)
Dept: TRANSPLANT | Facility: CLINIC | Age: 62
End: 2017-05-08

## 2017-05-08 ENCOUNTER — OFFICE VISIT (OUTPATIENT)
Dept: TRANSPLANT | Facility: CLINIC | Age: 62
End: 2017-05-08
Payer: COMMERCIAL

## 2017-05-08 ENCOUNTER — TELEPHONE (OUTPATIENT)
Dept: ENDOCRINOLOGY | Facility: CLINIC | Age: 62
End: 2017-05-08

## 2017-05-08 ENCOUNTER — HOSPITAL ENCOUNTER (OUTPATIENT)
Dept: RADIOLOGY | Facility: HOSPITAL | Age: 62
Discharge: HOME | End: 2017-05-08
Attending: INTERNAL MEDICINE
Payer: COMMERCIAL

## 2017-05-08 ENCOUNTER — LAB VISIT (OUTPATIENT)
Dept: LAB | Facility: HOSPITAL | Age: 62
End: 2017-05-08
Attending: INTERNAL MEDICINE
Payer: COMMERCIAL

## 2017-05-08 VITALS
BODY MASS INDEX: 26.03 KG/M2 | HEART RATE: 75 BPM | SYSTOLIC BLOOD PRESSURE: 149 MMHG | TEMPERATURE: 98 F | OXYGEN SATURATION: 97 % | HEIGHT: 73 IN | RESPIRATION RATE: 16 BRPM | DIASTOLIC BLOOD PRESSURE: 80 MMHG | WEIGHT: 196.44 LBS

## 2017-05-08 DIAGNOSIS — Z94.0 KIDNEY REPLACED BY TRANSPLANT: ICD-10-CM

## 2017-05-08 DIAGNOSIS — E11.42 DIABETIC POLYNEUROPATHY ASSOCIATED WITH TYPE 2 DIABETES MELLITUS: ICD-10-CM

## 2017-05-08 DIAGNOSIS — B18.2 HEP C W/O COMA, CHRONIC: ICD-10-CM

## 2017-05-08 DIAGNOSIS — I10 ESSENTIAL HYPERTENSION: ICD-10-CM

## 2017-05-08 DIAGNOSIS — N18.2 CKD (CHRONIC KIDNEY DISEASE) STAGE 2, GFR 60-89 ML/MIN: Primary | Chronic | ICD-10-CM

## 2017-05-08 DIAGNOSIS — Z79.60 LONG-TERM USE OF IMMUNOSUPPRESSANT MEDICATION: ICD-10-CM

## 2017-05-08 DIAGNOSIS — Z94.0 KIDNEY REPLACED BY TRANSPLANT: Primary | ICD-10-CM

## 2017-05-08 DIAGNOSIS — N25.81 HYPERPARATHYROIDISM DUE TO RENAL INSUFFICIENCY: ICD-10-CM

## 2017-05-08 LAB
BILIRUB UR QL STRIP: NEGATIVE
CLARITY UR REFRACT.AUTO: CLEAR
COLOR UR AUTO: NORMAL
CREAT UR-MCNC: 64 MG/DL
GLUCOSE UR QL STRIP: NEGATIVE
HGB UR QL STRIP: NEGATIVE
KETONES UR QL STRIP: NEGATIVE
LEUKOCYTE ESTERASE UR QL STRIP: NEGATIVE
NITRITE UR QL STRIP: NEGATIVE
PH UR STRIP: 7 [PH] (ref 5–8)
PROT UR QL STRIP: NEGATIVE
PROT UR-MCNC: 9 MG/DL
PROT/CREAT RATIO, UR: 0.14
SP GR UR STRIP: 1.01 (ref 1–1.03)
URN SPEC COLLECT METH UR: NORMAL
UROBILINOGEN UR STRIP-ACNC: NEGATIVE EU/DL

## 2017-05-08 PROCEDURE — 81003 URINALYSIS AUTO W/O SCOPE: CPT

## 2017-05-08 PROCEDURE — 99215 OFFICE O/P EST HI 40 MIN: CPT | Mod: S$GLB,,, | Performed by: INTERNAL MEDICINE

## 2017-05-08 PROCEDURE — 3060F POS MICROALBUMINURIA REV: CPT | Mod: 8P,S$GLB,, | Performed by: INTERNAL MEDICINE

## 2017-05-08 PROCEDURE — 76776 US EXAM K TRANSPL W/DOPPLER: CPT | Mod: TC

## 2017-05-08 PROCEDURE — 82570 ASSAY OF URINE CREATININE: CPT

## 2017-05-08 PROCEDURE — 99999 PR PBB SHADOW E&M-EST. PATIENT-LVL III: CPT | Mod: PBBFAC,,, | Performed by: INTERNAL MEDICINE

## 2017-05-08 PROCEDURE — 3045F PR MOST RECENT HEMOGLOBIN A1C LEVEL 7.0-9.0%: CPT | Mod: S$GLB,,, | Performed by: INTERNAL MEDICINE

## 2017-05-08 PROCEDURE — 76776 US EXAM K TRANSPL W/DOPPLER: CPT | Mod: 26,,, | Performed by: INTERNAL MEDICINE

## 2017-05-08 PROCEDURE — 1160F RVW MEDS BY RX/DR IN RCRD: CPT | Mod: S$GLB,,, | Performed by: INTERNAL MEDICINE

## 2017-05-08 RX ORDER — PREDNISONE 5 MG/1
5 TABLET ORAL DAILY
COMMUNITY
End: 2017-08-31 | Stop reason: SDUPTHER

## 2017-05-08 NOTE — TELEPHONE ENCOUNTER
----- Message from Lyla Ramos sent at 5/8/2017  9:12 AM CDT -----  Contact: [facility]   pharmacy  [phone]  [place]   -  Pharmacy called stating that the prescription the was requested can only be picked up from local pharmacy and not shipped out   Thanks,

## 2017-05-08 NOTE — TELEPHONE ENCOUNTER
----- Message from Sylvie Frias MD sent at 5/8/2017  3:33 PM CDT -----  Available results reviewed and require no immediate action.

## 2017-05-08 NOTE — LETTER
May 8, 2017        Alexi Glasgow  [address]  [address]  [facility] [address]  Phone: [phone]  Fax: [phone]             [facility]  [address]  [facility] [address]  Phone: [phone]   Patient: Ravi Zamorano   MR Number: [id number]   YOB: 1955   Date of Visit: 5/8/2017       Dear Dr. Alexi Glasgow    Thank you for referring Ravi Zamorano to me for evaluation. Attached you will find relevant portions of my assessment and plan of care.    If you have questions, please do not hesitate to call me. I look forward to following Ravi Zamorano along with you.    Sincerely,    Bola Sosa MD    Enclosure    If you would like to receive this communication electronically, please contact [email or web address] or [phone] to request Virool Link access.    Virool Link is a tool which provides read-only access to select patient information with whom you have a relationship. Its easy to use and provides real time access to review your patients record including encounter summaries, notes, results, and demographic information.    If you feel you have received this communication in error or would no longer like to receive these types of communications, please e-mail [email or web address]

## 2017-05-08 NOTE — TELEPHONE ENCOUNTER
Patient contacted, advised of MD review of labs and imaging.  No changes indicated at this time, reviewed plan for next labs and appt.  Patient verbalized understanding.

## 2017-05-08 NOTE — PROGRESS NOTES
"   Kidney Post-Transplant Assessment    Referring Physician: Alexi Glasgow  Current Nephrologist: Alexi Glasgow    ORGAN: RIGHT KIDNEY  Donor Type:  - brain death  PHS Increased Risk: yes  Cold Ischemia: 314 mins  Induction Medications: thymoglobulin    Subjective:     CC:  Reassessment of renal allograft function and management of chronic immunosuppression.    HPI:  Mr. Zamorano is a 61 y.o. year old Black or  male who received a  - brain death kidney transplant on 16.  He has CKD stage 3 - GFR 30-59 and his baseline creatinine is between 1.2 to 1.5. He takes mycophenolate mofetil and tacrolimus for maintenance immunosuppression. He denies any recent hospitalizations or ER visits since his previous clinic visit.    The patient feels overall well    No acute complaints    Good appetite    Very active at home    Good GI Tolerance to the IS medications    No admissions to the hospital in the recent past    Refers some right lower quadrant pain, and he is scheduled for a kidney US this afternoon        Review of Systems     Skin: no skin rash  CNS; no headaches, blurred vision, seizure, or syncope  ENT: No JVD,  Adenopathies,  nasal congestion. No oral lesions  Cardiac: No chest pain, dyspnea, claudication, edema or palpitations  Respiratory: No SOB, cough, hemoptysis   Gastro-intestinal: No diarrhea, constipation, abdominal pain, nausea, vomit. No ascitis  Genitourinary: no hematuria, dysuria, frequency, frequency  Musculoskeletal: joint pain, arthritis or vasculitic changes  Psych: alert awake, oriented, No cranial nerves deficit.      Objective:     Blood pressure (!) 149/80, pulse 75, temperature 97.6 °F (36.4 °C), temperature source Oral, resp. rate 16, height 6' 1" (1.854 m), weight 89.1 kg (196 lb 6.9 oz), SpO2 97 %.body mass index is 25.92 kg/(m^2).    Physical Exam    Head: normocephalic  Neck: No JVD, cervical axillary, or femoral adenopathies  Heart: no " murmurs, Normal s1 and s2, No gallops, no rubs, No murmurs  Lungs; CTA, good respiratory effort, no crackles  Abdomen: soft, non tender, no splenomegaly or hepatomegaly, no massess, no bruits  Extremities: No edema, skin rash, joint pain  SNC: awake, alert oriented. Cranial nerves are intact, no focalized, sensitivity and strength preserved      Labs:  Lab Results   Component Value Date    WBC 3.07 (L) 05/08/2017    HGB 11.4 (L) 05/08/2017    HCT 37.9 (L) 05/08/2017     05/08/2017    K 4.8 05/08/2017     05/08/2017    CO2 25 05/08/2017    BUN 18 05/08/2017    CREATININE 1.3 05/08/2017    EGFRNONAA 59.7 (A) 05/08/2017    CALCIUM 9.7 05/08/2017    PHOS 3.3 05/08/2017    MG 1.8 05/08/2017    ALBUMIN 3.7 05/08/2017    AST 16 05/01/2017    ALT 7 (L) 05/01/2017    UTPCR 0.76 (H) 05/01/2017    .0 (H) 02/05/2017    TACROLIMUS 8.8 05/01/2017       No results found for: EXTANC, EXTWBC, EXTSEGS, EXTPLATELETS, EXTHEMOGLOBI, EXTHEMATOCRI, EXTCREATININ, EXTSODIUM, EXTPOTASSIUM, EXTBUN, EXTCO2, EXTCALCIUM, EXTPHOSPHORU, EXTGLUCOSE, EXTALBUMIN, EXTAST, EXTALT, EXTBILITOTAL, EXTLIPASE, EXTAMYLASE    No results found for: EXTCYCLOSLVL, EXTSIROLIMUS, EXTTACROLVL, EXTPROTCRE, EXTPTHINTACT, EXTPROTEINUA, EXTWBCUA, EXTRBCUA    Labs were reviewed with the patient.    Assessment:     1. CKD (chronic kidney disease) stage 2, GFR 60-89 ml/min    2. Diabetic polyneuropathy associated with type 2 diabetes mellitus    3. Essential hypertension    4. Hep C w/o coma, chronic - diagnosed 2000    5. Hyperparathyroidism due to renal insufficiency    6. Long-term use of immunosuppressant medication        Plan:       In summary   For a kidney US today  Low K diet  Repeat pr/cr ratio today  Repeat labs today  Lower MMF to 500 bid due to leucopenia      1. CKD stage: stable GFR, no need for any acute change today. Education provided in appropriate fluid intake, potassium intake. Continue with oral hydration    2. Immunosuppression:  will lower MMF to 500 bid due to leucopenia. No symptoms suggestive of CMV. Patient feels great Will closely monitor for toxicities, education provided about adherence to medicines and need to communicate any side effct to the transplant nurse or physician    3. Allograft Function:stable, no acute intervention needed   Lab Results   Component Value Date    CREATININE 1.3 05/08/2017       4. Hypertension management:  Well controlled Continue with home blood pressure monitoring, low salt and healthy life discussed with the patient    5. Metabolic Bone Disease/Secondary Hyperparathyroidism: calcium and phosphorus as per our center protocol. Will monitor PTH, Vit D level, calcium      Lab Results   Component Value Date    .0 (H) 02/05/2017    CALCIUM 9.7 05/08/2017    CAION 1.11 11/06/2016    PHOS 3.3 05/08/2017       6. Electrolytes: reviewed with the patient, essentially within the normal range no need for acute changes today, will monitor as per our center guidelines     Lab Results   Component Value Date     05/08/2017    K 4.8 05/08/2017     05/08/2017    CO2 25 05/08/2017       7. Anemia:mild leucopenia but improved. Changed with MMF was done already.  will continue monitoring as per our center guidelines. No indication for acute intervention today     Lab Results   Component Value Date    WBC 3.07 (L) 05/08/2017    HGB 11.4 (L) 05/08/2017    HCT 37.9 (L) 05/08/2017    MCV 78 (L) 05/08/2017     05/08/2017       8.Proteinuria: repeat pr/cr ratio is pending will continue with pr/cr ratio as per our center guidelines  Lab Results   Component Value Date    PROTEINURINE 107 (H) 05/01/2017    CREATRANDUR 141.0 05/01/2017    UTPCR 0.76 (H) 05/01/2017        9. BK virus infection screening: will continue with urine or blood PCR as per our guidelines to prevent BK virus viremia and allograft dysfunction  Lab Results   Component Value Date    BKVIRUSPCRQB <250 05/01/2017         10. Weight  education: provided during the clinic visit   Body mass index is 25.92 kg/(m^2).       11.Patient safety education regarding immunosuppression including prophylaxis posttransplant for CMV, PCP : Education provided about vaccination and prevention of infections    12.  Cytopenias: no significant cytopenias will monitor as per our guidelines. Medicine list reviewed including potential causes of drug-induced cytopenias     Lab Results   Component Value Date    WBC 3.07 (L) 05/08/2017    HGB 11.4 (L) 05/08/2017    HCT 37.9 (L) 05/08/2017    MCV 78 (L) 05/08/2017     05/08/2017       I spoke with the patient for 30 minutes. More than half dedicated to counseling and education. All questions answered            Follow-up:   Clinic: return to transplant clinic weekly for the first month after transplant; every 2 weeks during months 2-3; then at 6-, 9-, 12-, 18-, 24-, and 36- months post-transplant to reassess for complications from immunosuppression toxicity and monitor for rejection.  Annually thereafter.    Labs: since patient remains at high risk for rejection and drug-related complications that warrant close monitoring, labs will be ordered as follows: continue twice weekly CBC, renal panel, and drug level for first month; then same labs once weekly through 3rd month post-transplant.  Urine for UA and protein/creatinine ratio monthly.  Urine BK - PCR at 1-, 3-, 6-, 9-, 12-, 18-, 24-, and 36- months post-transplant.  Hepatic panel at 1-, 2-, 3-, 6-, 9-, 12-, 18-, 24-, and 36- months post-transplant.    Bola Sosa MD       Education:   Material provided to the patient.  Patient reminded to call with any health changes since these can be early signs of significant complications.  Also, I advised the patient to be sure any new medications or changes of old medications are discussed with either a pharmacist or physician knowledgeable with transplant to avoid rejection/drug toxicity related to significant drug  interactions.    UNOS Patient Status  Functional Status: 100% - Normal, no complaints, no evidence of disease  Physical Capacity: No Limitations

## 2017-05-17 ENCOUNTER — CLINICAL SUPPORT (OUTPATIENT)
Dept: SMOKING CESSATION | Facility: CLINIC | Age: 62
End: 2017-05-17
Payer: COMMERCIAL

## 2017-05-17 DIAGNOSIS — F17.200 NICOTINE DEPENDENCE: Primary | ICD-10-CM

## 2017-05-17 PROCEDURE — 99407 BEHAV CHNG SMOKING > 10 MIN: CPT | Mod: S$GLB,,, | Performed by: INTERNAL MEDICINE

## 2017-06-05 ENCOUNTER — LAB VISIT (OUTPATIENT)
Dept: LAB | Facility: HOSPITAL | Age: 62
End: 2017-06-05
Attending: INTERNAL MEDICINE
Payer: COMMERCIAL

## 2017-06-05 DIAGNOSIS — Z94.0 KIDNEY REPLACED BY TRANSPLANT: ICD-10-CM

## 2017-06-05 LAB
ALBUMIN SERPL BCP-MCNC: 3.6 G/DL
ANION GAP SERPL CALC-SCNC: 10 MMOL/L
ANISOCYTOSIS BLD QL SMEAR: SLIGHT
BASOPHILS NFR BLD: 1 %
BUN SERPL-MCNC: 12 MG/DL
CALCIUM SERPL-MCNC: 9.6 MG/DL
CHLORIDE SERPL-SCNC: 105 MMOL/L
CO2 SERPL-SCNC: 25 MMOL/L
CREAT SERPL-MCNC: 1.3 MG/DL
DIFFERENTIAL METHOD: ABNORMAL
EOSINOPHIL NFR BLD: 8 %
ERYTHROCYTE [DISTWIDTH] IN BLOOD BY AUTOMATED COUNT: 15.6 %
EST. GFR  (AFRICAN AMERICAN): >60 ML/MIN/1.73 M^2
EST. GFR  (NON AFRICAN AMERICAN): 59.7 ML/MIN/1.73 M^2
GLUCOSE SERPL-MCNC: 149 MG/DL
HCT VFR BLD AUTO: 40 %
HGB BLD-MCNC: 12.2 G/DL
HYPOCHROMIA BLD QL SMEAR: ABNORMAL
LYMPHOCYTES NFR BLD: 12 %
MAGNESIUM SERPL-MCNC: 1.6 MG/DL
MCH RBC QN AUTO: 23.4 PG
MCHC RBC AUTO-ENTMCNC: 30.5 %
MCV RBC AUTO: 77 FL
METAMYELOCYTES NFR BLD MANUAL: 1 %
MONOCYTES NFR BLD: 13 %
NEUTROPHILS NFR BLD: 64 %
NEUTS BAND NFR BLD MANUAL: 1 %
OVALOCYTES BLD QL SMEAR: ABNORMAL
PHOSPHATE SERPL-MCNC: 3.1 MG/DL
PLATELET # BLD AUTO: 221 K/UL
PLATELET BLD QL SMEAR: ABNORMAL
PMV BLD AUTO: ABNORMAL FL
POIKILOCYTOSIS BLD QL SMEAR: SLIGHT
POLYCHROMASIA BLD QL SMEAR: ABNORMAL
POTASSIUM SERPL-SCNC: 5 MMOL/L
RBC # BLD AUTO: 5.21 M/UL
SODIUM SERPL-SCNC: 140 MMOL/L
TACROLIMUS BLD-MCNC: 8.7 NG/ML
TARGETS BLD QL SMEAR: ABNORMAL
WBC # BLD AUTO: 3.29 K/UL

## 2017-06-05 PROCEDURE — 85027 COMPLETE CBC AUTOMATED: CPT

## 2017-06-05 PROCEDURE — 85007 BL SMEAR W/DIFF WBC COUNT: CPT

## 2017-06-05 PROCEDURE — 83735 ASSAY OF MAGNESIUM: CPT

## 2017-06-05 PROCEDURE — 80197 ASSAY OF TACROLIMUS: CPT

## 2017-06-05 PROCEDURE — 80069 RENAL FUNCTION PANEL: CPT

## 2017-06-05 PROCEDURE — 36415 COLL VENOUS BLD VENIPUNCTURE: CPT

## 2017-06-05 NOTE — PROGRESS NOTES
CBC with no lymphocytes or monocytes? Seems strange to have 100% granulocytes whereas previously it was more of a normal % --> if he is feeling ill direct him to ED. Otherwise if he is feeling ok arrange for repeat CBC with diff in the next day or two

## 2017-06-16 RX ORDER — CARVEDILOL 12.5 MG/1
12.5 TABLET ORAL 2 TIMES DAILY
Qty: 60 TABLET | Refills: 3 | Status: SHIPPED | OUTPATIENT
Start: 2017-06-16 | End: 2017-10-18 | Stop reason: SDUPTHER

## 2017-06-16 NOTE — TELEPHONE ENCOUNTER
----- Message from Leslie Zarco sent at 6/16/2017  3:14 PM CDT -----  Contact: pt   Pt calling for med     At [facility]      ok'ed but went to wrong pharmacy    Please call in      ____________________________________________________________________  carvedilol (COREG) 12.5 MG tablet   Medication   Date: 6/16/2017 Department: [facility] - Endocrinology Ordering/Authorizing: REYES Rendon FNP  Order Providers     Prescribing Provider Encounter Provider  REYES Rendon FNP Irielle B. Gant, APRN, FNP  Supervision Information     Supervising Provider Type of Supervision  Shauna Etienne MD Collaborating Physician  Medication Detail      Disp Refills Start End   carvedilol (COREG) 12.5 MG tablet 60 tablet 3 6/16/2017    Sig - Route: Take 1 tablet (12.5 mg total) by mouth 2 (two) times daily. - Oral   E-Prescribing Status: Receipt confirmed by pharmacy (6/16/2017 12:53 PM CDT)   [facility] [place] [facility] [place] [place] [facility] [address]

## 2017-06-16 NOTE — TELEPHONE ENCOUNTER
Called [facility] pharmacy for cancel Rx which is sent over previous and called in new Rx to [facility].

## 2017-06-16 NOTE — TELEPHONE ENCOUNTER
----- Message from Kwadwo Calvo sent at 6/16/2017  9:15 AM CDT -----  Contact: Pt   Pt would like the nurse to give him a call back in regards to his medication Rx .. Contact number [phone]..

## 2017-06-29 ENCOUNTER — LAB VISIT (OUTPATIENT)
Dept: LAB | Facility: HOSPITAL | Age: 62
End: 2017-06-29
Attending: INTERNAL MEDICINE
Payer: COMMERCIAL

## 2017-06-29 ENCOUNTER — EPISODE CHANGES (OUTPATIENT)
Dept: HEPATOLOGY | Facility: CLINIC | Age: 62
End: 2017-06-29

## 2017-06-29 DIAGNOSIS — B18.2 CHRONIC HEPATITIS C WITHOUT HEPATIC COMA: ICD-10-CM

## 2017-06-29 DIAGNOSIS — Z94.0 KIDNEY REPLACED BY TRANSPLANT: ICD-10-CM

## 2017-06-29 LAB
ALBUMIN SERPL BCP-MCNC: 3.7 G/DL
ALBUMIN SERPL BCP-MCNC: 3.7 G/DL
ALP SERPL-CCNC: 75 U/L
ALT SERPL W/O P-5'-P-CCNC: 8 U/L
ANION GAP SERPL CALC-SCNC: 9 MMOL/L
ANION GAP SERPL CALC-SCNC: 9 MMOL/L
ANISOCYTOSIS BLD QL SMEAR: SLIGHT
AST SERPL-CCNC: 16 U/L
BASOPHILS # BLD AUTO: 0.03 K/UL
BASOPHILS NFR BLD: 0.5 %
BILIRUB SERPL-MCNC: 0.3 MG/DL
BUN SERPL-MCNC: 16 MG/DL
BUN SERPL-MCNC: 16 MG/DL
CALCIUM SERPL-MCNC: 9.8 MG/DL
CALCIUM SERPL-MCNC: 9.8 MG/DL
CHLORIDE SERPL-SCNC: 108 MMOL/L
CHLORIDE SERPL-SCNC: 108 MMOL/L
CO2 SERPL-SCNC: 25 MMOL/L
CO2 SERPL-SCNC: 25 MMOL/L
CREAT SERPL-MCNC: 1.2 MG/DL
CREAT SERPL-MCNC: 1.2 MG/DL
DIFFERENTIAL METHOD: ABNORMAL
EOSINOPHIL # BLD AUTO: 0.2 K/UL
EOSINOPHIL NFR BLD: 2.5 %
ERYTHROCYTE [DISTWIDTH] IN BLOOD BY AUTOMATED COUNT: 15.5 %
EST. GFR  (AFRICAN AMERICAN): >60 ML/MIN/1.73 M^2
EST. GFR  (AFRICAN AMERICAN): >60 ML/MIN/1.73 M^2
EST. GFR  (NON AFRICAN AMERICAN): >60 ML/MIN/1.73 M^2
EST. GFR  (NON AFRICAN AMERICAN): >60 ML/MIN/1.73 M^2
GLUCOSE SERPL-MCNC: 121 MG/DL
GLUCOSE SERPL-MCNC: 121 MG/DL
HCT VFR BLD AUTO: 41.5 %
HGB BLD-MCNC: 12.7 G/DL
LYMPHOCYTES # BLD AUTO: 0.9 K/UL
LYMPHOCYTES NFR BLD: 15.5 %
MAGNESIUM SERPL-MCNC: 1.6 MG/DL
MCH RBC QN AUTO: 23.1 PG
MCHC RBC AUTO-ENTMCNC: 30.6 %
MCV RBC AUTO: 76 FL
MONOCYTES # BLD AUTO: 1.7 K/UL
MONOCYTES NFR BLD: 28.3 %
NEUTROPHILS # BLD AUTO: 3.1 K/UL
NEUTROPHILS NFR BLD: 52.9 %
OVALOCYTES BLD QL SMEAR: ABNORMAL
PHOSPHATE SERPL-MCNC: 3.2 MG/DL
PLATELET # BLD AUTO: 138 K/UL
PLATELET BLD QL SMEAR: ABNORMAL
PMV BLD AUTO: ABNORMAL FL
POIKILOCYTOSIS BLD QL SMEAR: SLIGHT
POTASSIUM SERPL-SCNC: 4.8 MMOL/L
POTASSIUM SERPL-SCNC: 4.8 MMOL/L
PROT SERPL-MCNC: 7.3 G/DL
RBC # BLD AUTO: 5.49 M/UL
SCHISTOCYTES BLD QL SMEAR: ABNORMAL
SODIUM SERPL-SCNC: 142 MMOL/L
SODIUM SERPL-SCNC: 142 MMOL/L
TACROLIMUS BLD-MCNC: 9 NG/ML
WBC # BLD AUTO: 5.94 K/UL

## 2017-06-29 PROCEDURE — 85025 COMPLETE CBC W/AUTO DIFF WBC: CPT

## 2017-06-29 PROCEDURE — 80053 COMPREHEN METABOLIC PANEL: CPT

## 2017-06-29 PROCEDURE — 87522 HEPATITIS C REVRS TRNSCRPJ: CPT

## 2017-06-29 PROCEDURE — 83735 ASSAY OF MAGNESIUM: CPT

## 2017-06-29 PROCEDURE — 36415 COLL VENOUS BLD VENIPUNCTURE: CPT

## 2017-06-29 PROCEDURE — 84100 ASSAY OF PHOSPHORUS: CPT

## 2017-06-29 PROCEDURE — 80197 ASSAY OF TACROLIMUS: CPT

## 2017-06-30 LAB
HCV LOG: <1.08 LOG (10) IU/ML
HCV RNA QUANT PCR: <12 IU/ML
HCV, QUALITATIVE: NOT DETECTED IU/ML

## 2017-07-10 ENCOUNTER — TELEPHONE (OUTPATIENT)
Dept: HEPATOLOGY | Facility: CLINIC | Age: 62
End: 2017-07-10

## 2017-07-10 DIAGNOSIS — Z86.19 HISTORY OF HEPATITIS C: Primary | ICD-10-CM

## 2017-07-10 NOTE — TELEPHONE ENCOUNTER
HCV LAB REVIEW  Completed 12 weeks of Harvoni - 4/5/17  Post kidney transplant  Nicole 1a, tx naive, F0-1    Pertinent labs:  6/29/17  CMP stable  HCV neg    These labs document SVR12 following successful HCV treatment with Harvoni   This is consistent with a cure. Relapse is not expected.   No immunity is conferred and patient could be reinfected.     Pt has been notified       Please schedule labs   - HCV RNA - SVR24 - 9/29/17  - HCV RNA - 9/2018

## 2017-07-18 RX ORDER — DOCUSATE SODIUM 100 MG
CAPSULE ORAL
Qty: 60 CAPSULE | Refills: 3 | OUTPATIENT
Start: 2017-07-18

## 2017-07-27 ENCOUNTER — LAB VISIT (OUTPATIENT)
Dept: LAB | Facility: HOSPITAL | Age: 62
End: 2017-07-27
Attending: INTERNAL MEDICINE
Payer: COMMERCIAL

## 2017-07-27 DIAGNOSIS — Z94.0 KIDNEY REPLACED BY TRANSPLANT: ICD-10-CM

## 2017-07-27 LAB
ALBUMIN SERPL BCP-MCNC: 3.8 G/DL
ALBUMIN SERPL BCP-MCNC: 3.8 G/DL
ALP SERPL-CCNC: 72 U/L
ALT SERPL W/O P-5'-P-CCNC: 9 U/L
ANION GAP SERPL CALC-SCNC: 9 MMOL/L
AST SERPL-CCNC: 16 U/L
BASOPHILS # BLD AUTO: 0.01 K/UL
BASOPHILS NFR BLD: 0.2 %
BILIRUB DIRECT SERPL-MCNC: 0.1 MG/DL
BILIRUB SERPL-MCNC: 0.3 MG/DL
BUN SERPL-MCNC: 16 MG/DL
CALCIUM SERPL-MCNC: 9.4 MG/DL
CHLORIDE SERPL-SCNC: 105 MMOL/L
CO2 SERPL-SCNC: 26 MMOL/L
CREAT SERPL-MCNC: 1.3 MG/DL
DIFFERENTIAL METHOD: ABNORMAL
EOSINOPHIL # BLD AUTO: 0.1 K/UL
EOSINOPHIL NFR BLD: 2.4 %
ERYTHROCYTE [DISTWIDTH] IN BLOOD BY AUTOMATED COUNT: 16.1 %
EST. GFR  (AFRICAN AMERICAN): >60 ML/MIN/1.73 M^2
EST. GFR  (NON AFRICAN AMERICAN): 59.7 ML/MIN/1.73 M^2
GLUCOSE SERPL-MCNC: 170 MG/DL
HCT VFR BLD AUTO: 41.1 %
HGB BLD-MCNC: 12.8 G/DL
LYMPHOCYTES # BLD AUTO: 1.3 K/UL
LYMPHOCYTES NFR BLD: 25.1 %
MAGNESIUM SERPL-MCNC: 1.6 MG/DL
MCH RBC QN AUTO: 23.3 PG
MCHC RBC AUTO-ENTMCNC: 31.1 G/DL
MCV RBC AUTO: 75 FL
MONOCYTES # BLD AUTO: 0.8 K/UL
MONOCYTES NFR BLD: 15.5 %
NEUTROPHILS # BLD AUTO: 2.9 K/UL
NEUTROPHILS NFR BLD: 56.8 %
PHOSPHATE SERPL-MCNC: 3.1 MG/DL
PLATELET # BLD AUTO: 114 K/UL
PMV BLD AUTO: ABNORMAL FL
POTASSIUM SERPL-SCNC: 4.8 MMOL/L
PROT SERPL-MCNC: 7.2 G/DL
RBC # BLD AUTO: 5.49 M/UL
SODIUM SERPL-SCNC: 140 MMOL/L
TACROLIMUS BLD-MCNC: 7.2 NG/ML
WBC # BLD AUTO: 5.09 K/UL

## 2017-07-27 PROCEDURE — 85025 COMPLETE CBC W/AUTO DIFF WBC: CPT

## 2017-07-27 PROCEDURE — 36415 COLL VENOUS BLD VENIPUNCTURE: CPT

## 2017-07-27 PROCEDURE — 83735 ASSAY OF MAGNESIUM: CPT

## 2017-07-27 PROCEDURE — 80069 RENAL FUNCTION PANEL: CPT

## 2017-07-27 PROCEDURE — 84075 ASSAY ALKALINE PHOSPHATASE: CPT

## 2017-07-27 PROCEDURE — 80197 ASSAY OF TACROLIMUS: CPT

## 2017-07-27 PROCEDURE — 87799 DETECT AGENT NOS DNA QUANT: CPT

## 2017-07-28 LAB
BK VIRUS DNA PCR, QUANT, BLOOD: <125 COPIES/ML
BK VIRUS DNA, BLOOD: NOT DETECTED
LOG BKV COPIES/ML: <2.1 LOG (10) COPIES/ML

## 2017-08-09 ENCOUNTER — OFFICE VISIT (OUTPATIENT)
Dept: TRANSPLANT | Facility: CLINIC | Age: 62
End: 2017-08-09
Payer: COMMERCIAL

## 2017-08-09 VITALS
OXYGEN SATURATION: 100 % | SYSTOLIC BLOOD PRESSURE: 159 MMHG | BODY MASS INDEX: 26.38 KG/M2 | DIASTOLIC BLOOD PRESSURE: 90 MMHG | HEART RATE: 72 BPM | HEIGHT: 73 IN | RESPIRATION RATE: 18 BRPM | TEMPERATURE: 99 F | WEIGHT: 199.06 LBS

## 2017-08-09 DIAGNOSIS — I15.0 RENOVASCULAR HYPERTENSION: ICD-10-CM

## 2017-08-09 DIAGNOSIS — Z29.89 PROPHYLACTIC IMMUNOTHERAPY: ICD-10-CM

## 2017-08-09 DIAGNOSIS — Z99.2 SECONDARY DM WITH HYPERTENSION AND ESRD ON DIALYSIS: Chronic | ICD-10-CM

## 2017-08-09 DIAGNOSIS — Z94.0 S/P KIDNEY TRANSPLANT: Primary | ICD-10-CM

## 2017-08-09 DIAGNOSIS — N18.2 CKD (CHRONIC KIDNEY DISEASE) STAGE 2, GFR 60-89 ML/MIN: Chronic | ICD-10-CM

## 2017-08-09 DIAGNOSIS — E13.22 SECONDARY DM WITH HYPERTENSION AND ESRD ON DIALYSIS: Chronic | ICD-10-CM

## 2017-08-09 DIAGNOSIS — N18.6 SECONDARY DM WITH HYPERTENSION AND ESRD ON DIALYSIS: Chronic | ICD-10-CM

## 2017-08-09 DIAGNOSIS — I12.0 SECONDARY DM WITH HYPERTENSION AND ESRD ON DIALYSIS: Chronic | ICD-10-CM

## 2017-08-09 DIAGNOSIS — E11.21 UNCONTROLLED TYPE 2 DIABETES MELLITUS WITH DIABETIC NEPHROPATHY, UNSPECIFIED LONG TERM INSULIN USE STATUS: ICD-10-CM

## 2017-08-09 DIAGNOSIS — Z79.60 LONG-TERM USE OF IMMUNOSUPPRESSANT MEDICATION: ICD-10-CM

## 2017-08-09 DIAGNOSIS — E11.65 UNCONTROLLED TYPE 2 DIABETES MELLITUS WITH DIABETIC NEPHROPATHY, UNSPECIFIED LONG TERM INSULIN USE STATUS: ICD-10-CM

## 2017-08-09 DIAGNOSIS — Z86.19 HISTORY OF HEPATITIS C: ICD-10-CM

## 2017-08-09 PROCEDURE — 99999 PR PBB SHADOW E&M-EST. PATIENT-LVL V: CPT | Mod: PBBFAC,,, | Performed by: NURSE PRACTITIONER

## 2017-08-09 PROCEDURE — 3045F PR MOST RECENT HEMOGLOBIN A1C LEVEL 7.0-9.0%: CPT | Mod: S$GLB,,, | Performed by: NURSE PRACTITIONER

## 2017-08-09 PROCEDURE — 97802 MEDICAL NUTRITION INDIV IN: CPT | Mod: S$GLB,,, | Performed by: DIETITIAN, REGISTERED

## 2017-08-09 PROCEDURE — 3008F BODY MASS INDEX DOCD: CPT | Mod: S$GLB,,, | Performed by: NURSE PRACTITIONER

## 2017-08-09 PROCEDURE — 99214 OFFICE O/P EST MOD 30 MIN: CPT | Mod: S$GLB,,, | Performed by: NURSE PRACTITIONER

## 2017-08-09 NOTE — PROGRESS NOTES
REFERRING PROVIDER: Karley Nicole NP    REASON FOR VIST: low sodium diet education    PAST MEDICAL HX: s/p KTx 11/5/16    LABS: Noted    NUTRITION HX/INTERVENTION: Pt reports he continues to follow diabetic diet.  Has been consuming high Na and processed foods regularly.    -Discussed low Na diet guidelines with pt and encouraged to limit intake of high Na foods.  Discussed grocery shopping and label reading for sodium content.    -Encouraged pt to continue diabetic diet adherence.      Patient voiced understanding of education & goals. Contact information was provided & will f/u as needed at clinic visits.     Consultation Time: 15 minutes

## 2017-08-09 NOTE — PROGRESS NOTES
Kidney Post-Transplant Assessment    Referring Physician: Alexi Glasgow  Current Nephrologist: Alexi Glasgow    ORGAN: RIGHT KIDNEY  Donor Type:  - brain death  PHS Increased Risk: yes  Cold Ischemia: 314 mins  Induction Medications: thymoglobulin    Subjective:     CC:  Reassessment of renal allograft function and management of chronic immunosuppression.    HPI:  Mr. Zamorano is a 61 y.o. year old Black or  male with history of ESRD presumed secondary to DM/HTN who was on RRT since 16 until receiving PHS increased risk DDRT (THYMO induction given HCV positive donor and recipient, KDPI 85%, WIT 27 minutes, CIT 5 hours and 14 minutes, donor RPR positive thus recipient received PCN x3 doses; CMV D+/R+) on 16. He started Harvoni on 17.  He has CKD stage 2 - GFR 60-89 and his baseline creatinine is between  1.2-1.5. He takes mycophenolate mofetil, prednisone and tacrolimus for maintenance immunosuppression. He denies any recent hospitalizations or ER visits since his previous clinic visit.    Overall feels well. No health concerns today.   Denies chest pain, SOB, leg pain, abdominal pain or LUTs.  His BP was elevated today. Patient ate pizza and cheetos prior to clinic visit.       Past Medical History:   Diagnosis Date    Anxiety 2014    CKD (chronic kidney disease) stage 2, GFR 60-89 ml/min 2016    CKD (chronic kidney disease) stage 4, GFR 15-29 ml/min 2014    Depression - situational 2014    Diabetes mellitus     Diabetes type 2 since 2014    Diabetic neuropathy 2014    History of hepatitis C, s/p successful Rx w/ SVR12 - 2014    Genotype 1a, treatment naive 10/2014 liver biopsy - grade 1 / stage 1 Completed Harvoni w/ SVR    Hyperlipidemia 2014    Hypertension     Neuropathy     Organ transplant candidate 2014    Pancreatitis        Review of Systems   Constitutional: Negative for activity  "change, appetite change, chills, fatigue, fever and unexpected weight change.   HENT: Negative for congestion, facial swelling, postnasal drip, rhinorrhea, sinus pressure, sore throat and trouble swallowing.    Eyes: Negative for pain, redness and visual disturbance.   Respiratory: Negative for cough, chest tightness, shortness of breath and wheezing.    Cardiovascular: Negative.  Negative for chest pain, palpitations and leg swelling.   Gastrointestinal: Negative for abdominal pain, diarrhea, nausea and vomiting.   Genitourinary: Negative for dysuria, flank pain and urgency.   Musculoskeletal: Negative for gait problem, neck pain and neck stiffness.   Skin: Negative for rash.   Allergic/Immunologic: Positive for immunocompromised state. Negative for environmental allergies and food allergies.   Neurological: Negative for dizziness, weakness, light-headedness and headaches.   Psychiatric/Behavioral: Negative for agitation and confusion. The patient is not nervous/anxious.        Objective:     Blood pressure (!) 159/90, pulse 72, temperature 98.7 °F (37.1 °C), temperature source Oral, resp. rate 18, height 6' 1" (1.854 m), weight 90.3 kg (199 lb 1.2 oz), SpO2 100 %.body mass index is 26.26 kg/m².    Physical Exam   Constitutional: He is oriented to person, place, and time. He appears well-developed and well-nourished.   HENT:   Head: Normocephalic.   Mouth/Throat: Oropharynx is clear and moist. No oropharyngeal exudate.   Eyes: Conjunctivae and EOM are normal. Pupils are equal, round, and reactive to light. No scleral icterus.   Neck: Normal range of motion. Neck supple.   Cardiovascular: Normal rate, regular rhythm and normal heart sounds.    Pulmonary/Chest: Effort normal and breath sounds normal.   Abdominal: Soft. Normal appearance and bowel sounds are normal. He exhibits no distension and no mass. There is no splenomegaly or hepatomegaly. There is no tenderness. There is no rebound, no guarding, no CVA " tenderness, no tenderness at McBurney's point and negative Branch's sign.       Musculoskeletal: Normal range of motion. He exhibits no edema.   Lymphadenopathy:     He has no cervical adenopathy.   Neurological: He is alert and oriented to person, place, and time. He exhibits normal muscle tone. Coordination normal.   Skin: Skin is warm and dry.   Psychiatric: He has a normal mood and affect. His behavior is normal.   Vitals reviewed.      Labs:  Lab Results   Component Value Date    WBC 5.09 07/27/2017    HGB 12.8 (L) 07/27/2017    HCT 41.1 07/27/2017     07/27/2017    K 4.8 07/27/2017     07/27/2017    CO2 26 07/27/2017    BUN 16 07/27/2017    CREATININE 1.3 07/27/2017    EGFRNONAA 59.7 (A) 07/27/2017    CALCIUM 9.4 07/27/2017    PHOS 3.1 07/27/2017    MG 1.6 07/27/2017    ALBUMIN 3.8 07/27/2017    ALBUMIN 3.8 07/27/2017    AST 16 07/27/2017    ALT 9 (L) 07/27/2017    UTPCR 0.15 07/27/2017    .0 (H) 02/05/2017    TACROLIMUS 7.2 07/27/2017       No results found for: EXTANC, EXTWBC, EXTSEGS, EXTPLATELETS, EXTHEMOGLOBI, EXTHEMATOCRI, EXTCREATININ, EXTSODIUM, EXTPOTASSIUM, EXTBUN, EXTCO2, EXTCALCIUM, EXTPHOSPHORU, EXTGLUCOSE, EXTALBUMIN, EXTAST, EXTALT, EXTBILITOTAL, EXTLIPASE, EXTAMYLASE    No results found for: EXTCYCLOSLVL, EXTSIROLIMUS, EXTTACROLVL, EXTPROTCRE, EXTPTHINTACT, EXTPROTEINUA, EXTWBCUA, EXTRBCUA    Labs were reviewed with the patient.    Assessment:     1. S/P cadaveric kidney transplant 11/5/2016. ESRD secondary to HTN/DMII    2. Prophylactic immunotherapy    3. Long-term use of immunosuppressant medication    4. Renovascular hypertension    5. History of hepatitis C, s/p successful Rx w/ SVR12 - 6/2017    6. Uncontrolled type 2 diabetes mellitus with diabetic nephropathy, unspecified long term insulin use status    7. CKD (chronic kidney disease) stage 2, GFR 60-89 ml/min        Plan:     Follow-up:   1. CKD stage: 2  stable    2. Immunosuppression:   Prograf trough 7.2, which is   therapeutic target 5-7. Continue  Prograf 2/2, MMF 1000Mg BID, and Prednisone 5 mg QD  Bactrim 400/80 mg QD for PCP prophylaxis   Will continue to monitor for drug toxicities    3. Allograft Function: Stable. Continue good po hydration.       Lab Results   Component Value Date    CREATININE 1.3 07/27/2017 7/27/2017  eGFR if African American >60 mL/min/1.73 m^2 >60.0       4. Hypertension management: elevated today, patient reports eating pizza and cheetos prior to clinic visit. Patient meet with dietary today for a consult. .advise low salt diet and home BP monitoring    Nifedipine 30 mg QD    5. Metabolic Bone Disease/Secondary Hyperparathyroidism:stable  Will monitor PTH, CA and Vit D/guidelines  MG 1.6 07/27/2017     Lab Results   Component Value Date    .0 (H) 02/05/2017    CALCIUM 9.4 07/27/2017    CAION 1.11 11/06/2016    PHOS 3.1 07/27/2017   Mg ox 400 mg BID    6. Electrolytes: Normal calcium. Bicarbonate is normal  Lab Results   Component Value Date     07/27/2017    K 4.8 07/27/2017     07/27/2017    CO2 26 07/27/2017       7. Anemia: stable. No need for intervention      Lab Results   Component Value Date    WBC 5.09 07/27/2017    HGB 12.8 (L) 07/27/2017    HCT 41.1 07/27/2017    MCV 75 (L) 07/27/2017     (L) 07/27/2017       8.  Cytopenias: no significant cytopenias will monitor as per our guidelines. Medicine list reviewed including potential causes of drug-induced cytopenias    9.Proteinuria: continue p/c ratio as per guidelines   UTPCR 0.15 07/27/2017     10. BK virus infection screening:  will continue to monitor/ guidelines    11. Weight education: provided during the clinic visit   Body mass index is 26.26 kg/m².       12.Patient safety education regarding immunosuppression including prophylaxis posttransplant for CMV, PCP : Education provided about vaccination and prevention of infections          Follow-up:   Clinic: return to transplant clinic weekly for the first  month after transplant; every 2 weeks during months 2-3; then at 6-, 9-, 12-, 18-, 24-, and 36- months post-transplant to reassess for complications from immunosuppression toxicity and monitor for rejection.  Annually thereafter.    Labs: since patient remains at high risk for rejection and drug-related complications that warrant close monitoring, labs will be ordered as follows: continue twice weekly CBC, renal panel, and drug level for first month; then same labs once weekly through 3rd month post-transplant.  Urine for UA and protein/creatinine ratio monthly.  Urine BK - PCR at 1-, 3-, 6-, 9-, 12-, 18-, 24-, and 36- months post-transplant.  Hepatic panel at 1-, 2-, 3-, 6-, 9-, 12-, 18-, 24-, and 36- months post-transplant.    Karley Nicole NP       Education:   Material provided to the patient.  Patient reminded to call with any health changes since these can be early signs of significant complications.  Also, I advised the patient to be sure any new medications or changes of old medications are discussed with either a pharmacist or physician knowledgeable with transplant to avoid rejection/drug toxicity related to significant drug interactions.    UNOS Patient Status  Functional Status: 80% - Normal activity with effort: some symptoms of disease  Physical Capacity: No Limitations

## 2017-08-09 NOTE — LETTER
August 9, 2017        Alexi Glasgow  [address]  [address]  [facility] [address]  Phone: [phone]  Fax: [phone]             [facility]  [address]  [facility] [address]  Phone: [phone]   Patient: Ravi Zamorano   MR Number: [id number]   YOB: 1955   Date of Visit: 8/9/2017       Dear Dr. Alexi Glasgow    Thank you for referring Ravi Zamorano to me for evaluation. Attached you will find relevant portions of my assessment and plan of care.    If you have questions, please do not hesitate to call me. I look forward to following Ravi Zamorano along with you.    Sincerely,    Karley Nicole, NP    Enclosure    If you would like to receive this communication electronically, please contact [email or web address] or [phone] to request Qu[employer]-E Link access.    Qu[employer] Link is a tool which provides read-only access to select patient information with whom you have a relationship. Its easy to use and provides real time access to review your patients record including encounter summaries, notes, results, and demographic information.    If you feel you have received this communication in error or would no longer like to receive these types of communications, please e-mail [email or web address]

## 2017-08-11 DIAGNOSIS — Z94.0 KIDNEY REPLACED BY TRANSPLANT: Primary | ICD-10-CM

## 2017-08-15 RX ORDER — ATORVASTATIN CALCIUM 10 MG/1
TABLET, FILM COATED ORAL
Qty: 30 TABLET | Refills: 6 | OUTPATIENT
Start: 2017-08-15

## 2017-08-25 RX ORDER — PEN NEEDLE, DIABETIC 30 GX3/16"
NEEDLE, DISPOSABLE MISCELLANEOUS
Qty: 100 EACH | Refills: 11 | Status: SHIPPED | OUTPATIENT
Start: 2017-08-25 | End: 2018-05-09 | Stop reason: SDUPTHER

## 2017-08-31 ENCOUNTER — TELEPHONE (OUTPATIENT)
Dept: TRANSPLANT | Facility: CLINIC | Age: 62
End: 2017-08-31

## 2017-08-31 RX ORDER — PREDNISONE 5 MG/1
5 TABLET ORAL DAILY
Qty: 30 TABLET | Refills: 11 | Status: SHIPPED | OUTPATIENT
Start: 2017-08-31 | End: 2018-09-01

## 2017-08-31 NOTE — TELEPHONE ENCOUNTER
----- Message from Lanie Ballard sent at 8/31/2017 12:34 PM CDT -----  Contact: Pt  Pt states his script for Prednisone 5mg hasnt been sent to [facility], Pt would like to know if it can be sent over today     [facility] Ph# [phone]    Pt contact number [phone]  Thanks

## 2017-08-31 NOTE — TELEPHONE ENCOUNTER
5 mg Prednisone rx sent to MD for signature, patient currently receiving 10 mg tabs and cutting in half.      All questions answered.

## 2017-09-06 ENCOUNTER — TELEPHONE (OUTPATIENT)
Dept: TRANSPLANT | Facility: CLINIC | Age: 62
End: 2017-09-06

## 2017-09-06 NOTE — TELEPHONE ENCOUNTER
Patient contacted, inquiring about rx for requip.  No rx refills available, but encouraged patient to see PCP, patient does not currently have a pcp, transferred patient to primary care and [facility].  Also patient has an appt on 9/15, will discuss with general nephrologist.

## 2017-09-06 NOTE — TELEPHONE ENCOUNTER
----- Message from Husam Naik sent at 9/6/2017  9:08 AM CDT -----  Contact: PT  Would like a call regarding medication, call [phone]

## 2017-09-11 RX ORDER — ROPINIROLE 0.5 MG/1
0.5 TABLET, FILM COATED ORAL DAILY
Qty: 30 TABLET | Refills: 2 | Status: SHIPPED | OUTPATIENT
Start: 2017-09-11 | End: 2017-12-19 | Stop reason: SDUPTHER

## 2017-09-11 NOTE — TELEPHONE ENCOUNTER
----- Message from Tita Delatorre sent at 9/6/2017 11:04 AM CDT -----  Contact: Self/[phone]  RX request - refill or new RX.  Is this a refill or new RX: Refill    RX name and strength: ropinirole (REQUIP) 0.5 MG tablet  Directions: Take 0.5 mg by mouth once daily  Is this a 30 day or 90 day RX:    Pharmacy name and phone #: [facility]            [phone]  Comments:  Pt stated that he would like to know if he can get a refill on this medication until he is able to see the doctor in November. Please call and advise        Thanks!!!

## 2017-09-15 ENCOUNTER — OFFICE VISIT (OUTPATIENT)
Dept: NEPHROLOGY | Facility: CLINIC | Age: 62
End: 2017-09-15
Payer: COMMERCIAL

## 2017-09-15 VITALS
SYSTOLIC BLOOD PRESSURE: 140 MMHG | WEIGHT: 202.81 LBS | HEIGHT: 73 IN | DIASTOLIC BLOOD PRESSURE: 70 MMHG | OXYGEN SATURATION: 99 % | HEART RATE: 84 BPM | BODY MASS INDEX: 26.88 KG/M2

## 2017-09-15 DIAGNOSIS — Z94.0 DECEASED-DONOR KIDNEY TRANSPLANT RECIPIENT: ICD-10-CM

## 2017-09-15 DIAGNOSIS — N18.2 CHRONIC KIDNEY DISEASE, STAGE II (MILD): Primary | ICD-10-CM

## 2017-09-15 PROCEDURE — 99213 OFFICE O/P EST LOW 20 MIN: CPT | Mod: S$GLB,,, | Performed by: INTERNAL MEDICINE

## 2017-09-15 PROCEDURE — 3008F BODY MASS INDEX DOCD: CPT | Mod: S$GLB,,, | Performed by: INTERNAL MEDICINE

## 2017-09-15 PROCEDURE — 99999 PR PBB SHADOW E&M-EST. PATIENT-LVL III: CPT | Mod: PBBFAC,,, | Performed by: INTERNAL MEDICINE

## 2017-09-15 NOTE — LETTER
September 18, 2017      Sylvie Frias MD  [address]  [place] [address]           [facility] - Nephrology  [address]  [facility] [address]  Phone: [phone]  Fax: [phone]          Patient: Ravi Zamorano   MR Number: [id number]   YOB: 1955   Date of Visit: 9/15/2017       Dear Dr. Sylvie Frias:    Thank you for referring Ravi Zamorano to me for evaluation. Attached you will find relevant portions of my assessment and plan of care.    If you have questions, please do not hesitate to call me. I look forward to following Ravi Zamorano along with you.    Sincerely,        Enclosure  CC:  No Recipients    If you would like to receive this communication electronically, please contact [email or web address] or [phone] to request more information on [employer] access.    For providers and/or their staff who would like to refer a patient to [facility], please contact us through our one-stop-shop provider referral line, [facility], at [phone].    If you feel you have received this communication in error or would no longer like to receive these types of communications, please e-mail [email or web address]

## 2017-09-18 DIAGNOSIS — E78.5 HYPERLIPIDEMIA, UNSPECIFIED HYPERLIPIDEMIA TYPE: Primary | ICD-10-CM

## 2017-09-18 RX ORDER — ATORVASTATIN CALCIUM 10 MG/1
10 TABLET, FILM COATED ORAL DAILY
Qty: 30 TABLET | Refills: 6 | Status: SHIPPED | OUTPATIENT
Start: 2017-09-18 | End: 2017-09-19 | Stop reason: SDUPTHER

## 2017-09-19 DIAGNOSIS — E78.5 HYPERLIPIDEMIA, UNSPECIFIED HYPERLIPIDEMIA TYPE: ICD-10-CM

## 2017-09-19 RX ORDER — ATORVASTATIN CALCIUM 10 MG/1
10 TABLET, FILM COATED ORAL DAILY
Qty: 30 TABLET | Refills: 6 | Status: SHIPPED | OUTPATIENT
Start: 2017-09-19 | End: 2017-11-06

## 2017-09-21 ENCOUNTER — TELEPHONE (OUTPATIENT)
Dept: TRANSPLANT | Facility: CLINIC | Age: 62
End: 2017-09-21

## 2017-09-21 NOTE — TELEPHONE ENCOUNTER
Patient contacted this writer stating that he forgot to take one dose of his Prednisone, advised patient to continue taking as directed. He verbalized understanding.  Reviewed plan for next visit in 11/2017 for 1 yr anniversary.  Patient reports he is established with general nephrologist and PCP, updated care team in [facility].  All questions answered.

## 2017-09-25 NOTE — PROGRESS NOTES
Subjective:       Patient ID: Ravi Zamorano is a 61 y.o. Black or  male who presents for re-evaluation of progression of Chronic Kidney Disease and Kidney Transplant    HPI Mr. Zamorano is a 61 year old man with medical history of diabetes, hypertension, ESRD previously on HD status post  donor kidney transplant (HCV positive donor, patient received Harvoni) 2016 presenting for follow up of allograft function and immunosuppression.  Patient reports doing well, has adequate fluid intake, blood sugars well-controlled.  He otherwise denies any fever, chest pain, shortness of breath, abdominal pain, diarrhea, dysuria/hematuria.  He denies any recent acute illness/infections/injury, no recent hospitalizations/ED visits/procedures.    Review of Systems   Constitutional: Negative for appetite change, fatigue and fever.   Respiratory: Negative for cough and shortness of breath.    Cardiovascular: Negative for chest pain and leg swelling.   Gastrointestinal: Negative for abdominal pain, constipation, diarrhea, nausea and vomiting.   Genitourinary: Negative for dysuria, flank pain, frequency, hematuria and urgency.   Musculoskeletal: Negative for arthralgias, back pain and joint swelling.   Skin: Negative for rash.   Neurological: Negative for dizziness and light-headedness.   All other systems reviewed and are negative.      Objective:      Physical Exam   Constitutional: He appears well-developed and well-nourished.   Cardiovascular: Normal rate and regular rhythm.  Exam reveals no gallop and no friction rub.    No murmur heard.  Pulmonary/Chest: Effort normal and breath sounds normal. No respiratory distress. He has no wheezes. He has no rales.   Musculoskeletal: He exhibits no edema.   Neurological: He is alert.   Skin: Skin is warm and dry. No rash noted.   Vitals reviewed.      Assessment:       1. Chronic kidney disease, stage II (mild)    2. -donor kidney transplant recipient         Plan:     Mr. Zamorano is a 61 year old man with medical history of diabetes, hypertension, ESRD previously on HD status post  donor kidney transplant (HCV positive donor, patient received Harvoni) 2016 presenting for follow up of allograft function and immunosuppression.  Patient creatinine stable, will continue to trend.  Stressed importance of blood pressure/glycemic control to prevent any further progression of kidney disease, patient voiced understanding.   - Immunosuppression: per Transplant  - Anemia: Hgb at goal, no indication for erythropoetin therapy  - Bone/mineral metabolism: patient with secondary hyperparathyroidism, PTH at goal for stage CKD    Return to clinic in 6 months with renal/heme panel, iron/TIBC/ferritin, urinalysis/culture, urine protein/creatinine ratio, PTH/VitD prior to next visit

## 2017-09-29 ENCOUNTER — LAB VISIT (OUTPATIENT)
Dept: LAB | Facility: HOSPITAL | Age: 62
End: 2017-09-29
Attending: INTERNAL MEDICINE
Payer: MEDICARE

## 2017-09-29 ENCOUNTER — EPISODE CHANGES (OUTPATIENT)
Dept: HEPATOLOGY | Facility: CLINIC | Age: 62
End: 2017-09-29

## 2017-09-29 DIAGNOSIS — Z94.0 KIDNEY REPLACED BY TRANSPLANT: ICD-10-CM

## 2017-09-29 DIAGNOSIS — Z86.19 HISTORY OF HEPATITIS C: ICD-10-CM

## 2017-09-29 LAB
ALBUMIN SERPL BCP-MCNC: 3.5 G/DL
ANION GAP SERPL CALC-SCNC: 9 MMOL/L
ANISOCYTOSIS BLD QL SMEAR: SLIGHT
BASOPHILS # BLD AUTO: 0.02 K/UL
BASOPHILS NFR BLD: 0.4 %
BUN SERPL-MCNC: 13 MG/DL
CALCIUM SERPL-MCNC: 9.7 MG/DL
CHLORIDE SERPL-SCNC: 106 MMOL/L
CO2 SERPL-SCNC: 25 MMOL/L
CREAT SERPL-MCNC: 1.3 MG/DL
DIFFERENTIAL METHOD: ABNORMAL
EOSINOPHIL # BLD AUTO: 0.1 K/UL
EOSINOPHIL NFR BLD: 1.8 %
ERYTHROCYTE [DISTWIDTH] IN BLOOD BY AUTOMATED COUNT: 15.7 %
EST. GFR  (AFRICAN AMERICAN): >60 ML/MIN/1.73 M^2
EST. GFR  (NON AFRICAN AMERICAN): 59.7 ML/MIN/1.73 M^2
GLUCOSE SERPL-MCNC: 149 MG/DL
HCT VFR BLD AUTO: 42 %
HGB BLD-MCNC: 12.8 G/DL
LYMPHOCYTES # BLD AUTO: 1.3 K/UL
LYMPHOCYTES NFR BLD: 23.1 %
MAGNESIUM SERPL-MCNC: 1.8 MG/DL
MCH RBC QN AUTO: 23.1 PG
MCHC RBC AUTO-ENTMCNC: 30.5 G/DL
MCV RBC AUTO: 76 FL
MONOCYTES # BLD AUTO: 1.1 K/UL
MONOCYTES NFR BLD: 20.9 %
NEUTROPHILS # BLD AUTO: 2.9 K/UL
NEUTROPHILS NFR BLD: 53.8 %
PHOSPHATE SERPL-MCNC: 2 MG/DL
PLATELET # BLD AUTO: 137 K/UL
PLATELET BLD QL SMEAR: ABNORMAL
PMV BLD AUTO: ABNORMAL FL
POTASSIUM SERPL-SCNC: 4.5 MMOL/L
RBC # BLD AUTO: 5.54 M/UL
SODIUM SERPL-SCNC: 140 MMOL/L
TACROLIMUS BLD-MCNC: 8.6 NG/ML
WBC # BLD AUTO: 5.45 K/UL

## 2017-09-29 PROCEDURE — 83735 ASSAY OF MAGNESIUM: CPT

## 2017-09-29 PROCEDURE — 85025 COMPLETE CBC W/AUTO DIFF WBC: CPT

## 2017-09-29 PROCEDURE — 87522 HEPATITIS C REVRS TRNSCRPJ: CPT

## 2017-09-29 PROCEDURE — 80069 RENAL FUNCTION PANEL: CPT

## 2017-09-29 PROCEDURE — 36415 COLL VENOUS BLD VENIPUNCTURE: CPT

## 2017-09-29 PROCEDURE — 80197 ASSAY OF TACROLIMUS: CPT

## 2017-09-29 NOTE — PROGRESS NOTES
- Monocytosis of 20%: unknown etiology, will monitor it. Mild thrombocytopenia, will monitor it. We might consider hematology if abnormalities continue to remain.   - Low PO4 (single reading), not on supplement. Please recheck it in a week.

## 2017-10-02 LAB
HCV LOG: <1.08 LOG (10) IU/ML
HCV RNA QUANT PCR: <12 IU/ML
HCV, QUALITATIVE: NOT DETECTED IU/ML

## 2017-10-03 DIAGNOSIS — E83.39 HYPOPHOSPHATEMIA: Primary | ICD-10-CM

## 2017-10-09 ENCOUNTER — LAB VISIT (OUTPATIENT)
Dept: LAB | Facility: HOSPITAL | Age: 62
End: 2017-10-09
Attending: INTERNAL MEDICINE
Payer: MEDICARE

## 2017-10-09 DIAGNOSIS — Z94.0 KIDNEY REPLACED BY TRANSPLANT: ICD-10-CM

## 2017-10-09 DIAGNOSIS — E83.39 HYPOPHOSPHATEMIA: ICD-10-CM

## 2017-10-09 LAB
ANISOCYTOSIS BLD QL SMEAR: SLIGHT
BASOPHILS # BLD AUTO: 0.04 K/UL
BASOPHILS NFR BLD: 0.7 %
DIFFERENTIAL METHOD: ABNORMAL
EOSINOPHIL # BLD AUTO: 0.1 K/UL
EOSINOPHIL NFR BLD: 2.1 %
ERYTHROCYTE [DISTWIDTH] IN BLOOD BY AUTOMATED COUNT: 15.8 %
HCT VFR BLD AUTO: 41.5 %
HGB BLD-MCNC: 12.6 G/DL
LYMPHOCYTES # BLD AUTO: 1.4 K/UL
LYMPHOCYTES NFR BLD: 23.4 %
MCH RBC QN AUTO: 23.4 PG
MCHC RBC AUTO-ENTMCNC: 30.4 G/DL
MCV RBC AUTO: 77 FL
MONOCYTES # BLD AUTO: 0.8 K/UL
MONOCYTES NFR BLD: 14.1 %
NEUTROPHILS # BLD AUTO: 3.5 K/UL
NEUTROPHILS NFR BLD: 59.7 %
OVALOCYTES BLD QL SMEAR: ABNORMAL
PHOSPHATE SERPL-MCNC: 2.4 MG/DL
PLATELET # BLD AUTO: 118 K/UL
PLATELET BLD QL SMEAR: ABNORMAL
PMV BLD AUTO: ABNORMAL FL
POIKILOCYTOSIS BLD QL SMEAR: SLIGHT
RBC # BLD AUTO: 5.38 M/UL
WBC # BLD AUTO: 5.82 K/UL

## 2017-10-09 PROCEDURE — 85025 COMPLETE CBC W/AUTO DIFF WBC: CPT

## 2017-10-09 PROCEDURE — 84100 ASSAY OF PHOSPHORUS: CPT

## 2017-10-09 PROCEDURE — 36415 COLL VENOUS BLD VENIPUNCTURE: CPT

## 2017-10-18 RX ORDER — GABAPENTIN 300 MG/1
CAPSULE ORAL
Qty: 90 CAPSULE | Refills: 4 | Status: SHIPPED | OUTPATIENT
Start: 2017-10-18 | End: 2018-05-22

## 2017-10-18 RX ORDER — CARVEDILOL 12.5 MG/1
TABLET ORAL
Qty: 60 TABLET | Refills: 6 | Status: SHIPPED | OUTPATIENT
Start: 2017-10-18 | End: 2018-05-31

## 2017-10-23 ENCOUNTER — TELEPHONE (OUTPATIENT)
Dept: TRANSPLANT | Facility: CLINIC | Age: 62
End: 2017-10-23

## 2017-10-23 ENCOUNTER — LAB VISIT (OUTPATIENT)
Dept: LAB | Facility: HOSPITAL | Age: 62
End: 2017-10-23
Attending: INTERNAL MEDICINE
Payer: COMMERCIAL

## 2017-10-23 DIAGNOSIS — Z94.0 KIDNEY REPLACED BY TRANSPLANT: ICD-10-CM

## 2017-10-23 LAB
ALBUMIN SERPL BCP-MCNC: 3.5 G/DL
ALBUMIN SERPL BCP-MCNC: 3.5 G/DL
ALP SERPL-CCNC: 74 U/L
ALT SERPL W/O P-5'-P-CCNC: 9 U/L
ANION GAP SERPL CALC-SCNC: 8 MMOL/L
ANISOCYTOSIS BLD QL SMEAR: SLIGHT
AST SERPL-CCNC: 19 U/L
BASOPHILS # BLD AUTO: 0.04 K/UL
BASOPHILS NFR BLD: 0.7 %
BILIRUB DIRECT SERPL-MCNC: 0.1 MG/DL
BILIRUB SERPL-MCNC: 0.3 MG/DL
BUN SERPL-MCNC: 19 MG/DL
CALCIUM SERPL-MCNC: 9.4 MG/DL
CHLORIDE SERPL-SCNC: 106 MMOL/L
CO2 SERPL-SCNC: 27 MMOL/L
CREAT SERPL-MCNC: 1.3 MG/DL
DIFFERENTIAL METHOD: ABNORMAL
EOSINOPHIL # BLD AUTO: 0.1 K/UL
EOSINOPHIL NFR BLD: 2 %
ERYTHROCYTE [DISTWIDTH] IN BLOOD BY AUTOMATED COUNT: 16.4 %
EST. GFR  (AFRICAN AMERICAN): >60 ML/MIN/1.73 M^2
EST. GFR  (NON AFRICAN AMERICAN): 59.7 ML/MIN/1.73 M^2
GLUCOSE SERPL-MCNC: 175 MG/DL
HCT VFR BLD AUTO: 41.7 %
HGB BLD-MCNC: 12.4 G/DL
HYPOCHROMIA BLD QL SMEAR: ABNORMAL
IMM GRANULOCYTES # BLD AUTO: 0.01 K/UL
IMM GRANULOCYTES NFR BLD AUTO: 0.2 %
LYMPHOCYTES # BLD AUTO: 1.2 K/UL
LYMPHOCYTES NFR BLD: 20.5 %
MAGNESIUM SERPL-MCNC: 1.4 MG/DL
MCH RBC QN AUTO: 23 PG
MCHC RBC AUTO-ENTMCNC: 29.7 G/DL
MCV RBC AUTO: 77 FL
MONOCYTES # BLD AUTO: 1.1 K/UL
MONOCYTES NFR BLD: 19.6 %
NEUTROPHILS # BLD AUTO: 3.2 K/UL
NEUTROPHILS NFR BLD: 57 %
NRBC BLD-RTO: 0 /100 WBC
OVALOCYTES BLD QL SMEAR: ABNORMAL
PHOSPHATE SERPL-MCNC: 2.2 MG/DL
PLATELET # BLD AUTO: 126 K/UL
PLATELET BLD QL SMEAR: ABNORMAL
PMV BLD AUTO: ABNORMAL FL
POIKILOCYTOSIS BLD QL SMEAR: SLIGHT
POLYCHROMASIA BLD QL SMEAR: ABNORMAL
POTASSIUM SERPL-SCNC: 4.1 MMOL/L
PROT SERPL-MCNC: 7.1 G/DL
RBC # BLD AUTO: 5.39 M/UL
SCHISTOCYTES BLD QL SMEAR: PRESENT
SODIUM SERPL-SCNC: 141 MMOL/L
TACROLIMUS BLD-MCNC: 8.8 NG/ML
WBC # BLD AUTO: 5.6 K/UL

## 2017-10-23 PROCEDURE — 84075 ASSAY ALKALINE PHOSPHATASE: CPT

## 2017-10-23 PROCEDURE — 82247 BILIRUBIN TOTAL: CPT

## 2017-10-23 PROCEDURE — 83735 ASSAY OF MAGNESIUM: CPT

## 2017-10-23 PROCEDURE — 80197 ASSAY OF TACROLIMUS: CPT

## 2017-10-23 PROCEDURE — 85025 COMPLETE CBC W/AUTO DIFF WBC: CPT

## 2017-10-23 PROCEDURE — 36415 COLL VENOUS BLD VENIPUNCTURE: CPT

## 2017-10-23 PROCEDURE — 80069 RENAL FUNCTION PANEL: CPT

## 2017-10-23 PROCEDURE — 87799 DETECT AGENT NOS DNA QUANT: CPT

## 2017-10-23 NOTE — TELEPHONE ENCOUNTER
Pt contacted and advised of MD's lab review and instructions.  Pt. repeated instructions and verbalized understanding.     Patient requesting to repeat level after increasing high phos foods.

## 2017-10-23 NOTE — TELEPHONE ENCOUNTER
----- Message from Kena Robles RN sent at 10/23/2017 12:54 PM CDT -----      ----- Message -----  From: Sylvie Frias MD  Sent: 10/23/2017  12:44 PM  To: [facility]    Phos low more consistently now --> encourage higher phos diet and start  mg BID

## 2017-10-30 ENCOUNTER — TELEPHONE (OUTPATIENT)
Dept: TRANSPLANT | Facility: CLINIC | Age: 62
End: 2017-10-30

## 2017-10-30 ENCOUNTER — LAB VISIT (OUTPATIENT)
Dept: LAB | Facility: HOSPITAL | Age: 62
End: 2017-10-30
Attending: INTERNAL MEDICINE
Payer: COMMERCIAL

## 2017-10-30 DIAGNOSIS — Z94.0 KIDNEY REPLACED BY TRANSPLANT: ICD-10-CM

## 2017-10-30 LAB
ALBUMIN SERPL BCP-MCNC: 3.4 G/DL
ANION GAP SERPL CALC-SCNC: 8 MMOL/L
BUN SERPL-MCNC: 14 MG/DL
CALCIUM SERPL-MCNC: 9.1 MG/DL
CHLORIDE SERPL-SCNC: 106 MMOL/L
CO2 SERPL-SCNC: 25 MMOL/L
CREAT SERPL-MCNC: 1.1 MG/DL
EST. GFR  (AFRICAN AMERICAN): >60 ML/MIN/1.73 M^2
EST. GFR  (NON AFRICAN AMERICAN): >60 ML/MIN/1.73 M^2
GLUCOSE SERPL-MCNC: 198 MG/DL
PHOSPHATE SERPL-MCNC: 2.3 MG/DL
POTASSIUM SERPL-SCNC: 4.2 MMOL/L
SODIUM SERPL-SCNC: 139 MMOL/L

## 2017-10-30 PROCEDURE — 36415 COLL VENOUS BLD VENIPUNCTURE: CPT

## 2017-10-30 PROCEDURE — 80069 RENAL FUNCTION PANEL: CPT

## 2017-10-30 NOTE — PROGRESS NOTES
Mr. Zamorano has opted to increase high phos foods and not to start Kphos at this time.  He will be here for clinic visit on 11/6.  Do you want another repeat?

## 2017-10-30 NOTE — PROGRESS NOTES
Phos remains low -->  on higher phos diet and start  mg BID; if he is on  mg BID as written in result note dated 10/23/17 then increase to 500 mg BID.

## 2017-10-30 NOTE — TELEPHONE ENCOUNTER
Contact made to patient, scheduled 1 year follow up for 11/6 @ 10 am.      Reviewed labs from today,  Encouraged continued karine phos diet.  Patient states he is working on eating higher phos foods.

## 2017-10-30 NOTE — TELEPHONE ENCOUNTER
----- Message from MARGARITA SAMANIEGO sent at 10/30/2017 12:15 PM CDT -----  I've called him and left messages a few times, no call back.  Is there another number on him that I don't have access to?    ----- Message -----  From: Kena Robles RN  Sent: 10/24/2017  10:57 AM  To: MARGARITA SAMANIEOG    Please schedule one year follow up with Sylvie or Karley in the in the 1st couple weeks of November.... Labs done on 10/23.

## 2017-11-01 ENCOUNTER — TELEPHONE (OUTPATIENT)
Dept: TRANSPLANT | Facility: CLINIC | Age: 62
End: 2017-11-01

## 2017-11-01 NOTE — TELEPHONE ENCOUNTER
[facility]  Patient reports he has enough [place] and MMF.  This writer transferred patient to [facility] so that he can advise them.

## 2017-11-03 ENCOUNTER — OFFICE VISIT (OUTPATIENT)
Dept: INTERNAL MEDICINE | Facility: CLINIC | Age: 62
End: 2017-11-03
Payer: MEDICARE

## 2017-11-03 VITALS
WEIGHT: 197 LBS | OXYGEN SATURATION: 98 % | HEART RATE: 64 BPM | SYSTOLIC BLOOD PRESSURE: 151 MMHG | BODY MASS INDEX: 26.11 KG/M2 | HEIGHT: 73 IN | DIASTOLIC BLOOD PRESSURE: 83 MMHG

## 2017-11-03 DIAGNOSIS — E11.42 DIABETIC POLYNEUROPATHY ASSOCIATED WITH TYPE 2 DIABETES MELLITUS: ICD-10-CM

## 2017-11-03 DIAGNOSIS — D63.8 ANEMIA OF CHRONIC DISEASE: ICD-10-CM

## 2017-11-03 DIAGNOSIS — E11.22 TYPE 2 DIABETES MELLITUS WITH STAGE 2 CHRONIC KIDNEY DISEASE, WITH LONG-TERM CURRENT USE OF INSULIN: ICD-10-CM

## 2017-11-03 DIAGNOSIS — N18.2 TYPE 2 DIABETES MELLITUS WITH STAGE 2 CHRONIC KIDNEY DISEASE, WITH LONG-TERM CURRENT USE OF INSULIN: ICD-10-CM

## 2017-11-03 DIAGNOSIS — Z79.60 LONG-TERM USE OF IMMUNOSUPPRESSANT MEDICATION: ICD-10-CM

## 2017-11-03 DIAGNOSIS — I50.9 CHRONIC CONGESTIVE HEART FAILURE, UNSPECIFIED CONGESTIVE HEART FAILURE TYPE: ICD-10-CM

## 2017-11-03 DIAGNOSIS — Z94.0 S/P KIDNEY TRANSPLANT: ICD-10-CM

## 2017-11-03 DIAGNOSIS — Z00.00 HEALTH CARE MAINTENANCE: Primary | ICD-10-CM

## 2017-11-03 DIAGNOSIS — Z79.4 TYPE 2 DIABETES MELLITUS WITH STAGE 2 CHRONIC KIDNEY DISEASE, WITH LONG-TERM CURRENT USE OF INSULIN: ICD-10-CM

## 2017-11-03 DIAGNOSIS — R42 VERTIGO: ICD-10-CM

## 2017-11-03 DIAGNOSIS — N18.2 CKD (CHRONIC KIDNEY DISEASE) STAGE 2, GFR 60-89 ML/MIN: Chronic | ICD-10-CM

## 2017-11-03 PROCEDURE — 99999 PR PBB SHADOW E&M-EST. PATIENT-LVL V: CPT | Mod: PBBFAC,,, | Performed by: INTERNAL MEDICINE

## 2017-11-03 PROCEDURE — 99396 PREV VISIT EST AGE 40-64: CPT | Mod: S$PBB,,, | Performed by: INTERNAL MEDICINE

## 2017-11-03 PROCEDURE — 99215 OFFICE O/P EST HI 40 MIN: CPT | Mod: PBBFAC | Performed by: INTERNAL MEDICINE

## 2017-11-03 RX ORDER — FAMOTIDINE 20 MG/1
TABLET, FILM COATED ORAL
Qty: 30 TABLET | Refills: 11 | OUTPATIENT
Start: 2017-11-03

## 2017-11-03 RX ORDER — SULFAMETHOXAZOLE AND TRIMETHOPRIM 400; 80 MG/1; MG/1
TABLET ORAL
Qty: 30 TABLET | Refills: 11 | OUTPATIENT
Start: 2017-11-03

## 2017-11-03 RX ORDER — ACETAMINOPHEN 500 MG
1 TABLET ORAL NIGHTLY
COMMUNITY
Start: 2017-11-03

## 2017-11-03 RX ORDER — MECLIZINE HYDROCHLORIDE 25 MG/1
25 TABLET ORAL 3 TIMES DAILY PRN
Qty: 21 TABLET | Refills: 0 | Status: SHIPPED | OUTPATIENT
Start: 2017-11-03 | End: 2023-07-18 | Stop reason: SDUPTHER

## 2017-11-03 NOTE — PROGRESS NOTES
"Subjective:       Patient ID: Ravi Zamorano is a 61 y.o. male.    Chief Complaint: Establish Care (new pt seeking out a new primary care physician.) and Annual Exam (yearly check up.)    HPI   Ravi Zamorano is a 61 y.o. male here to establish care and have yearly preventative healthcare visit.     He has a medical history of diabetes, hypertension, ESRD previously on HD status post  donor kidney transplant (HCV positive donor, patient received Harvoni.)   Immunosuppression per Dr. Sosa. Nephrology Dr. Ewing. CKD 2/3.     Diabetes previously followed by Karan Pruitt.  Last a1c 7.7% in 2016.   trulicity 0.75 mg weekly  levemir 35 u qhs (sometimes 32 if BG running low)  Lispro 18 bf, 16 lunch and dinner plus SSI  Reports BG are up and down. This am 135, up when doesn't eat right.   Sometimes up to 280.   Has dropped low when took insulin starting at low BG.   Also has dropped if exercising.     Smoker -graduated from smoking cessation class on 17    Depression    HTN  Nifedipine 30 - is taking somewhat sporadically. Does not take if SBP <140.     Walks frequently for exercise.     Dizziness, norma hallpike previously positive. Meclizine prn.   Review of Systems   Constitutional: Negative for fever.   HENT: Negative.    Eyes: Negative.    Respiratory: Negative for shortness of breath.    Cardiovascular: Negative for chest pain and leg swelling.   Gastrointestinal: Negative for abdominal pain, diarrhea, nausea and vomiting.   Genitourinary: Negative.    Musculoskeletal: Negative for arthralgias.   Skin: Negative for rash.   Psychiatric/Behavioral: Negative.        Objective:   BP (!) 151/83 (BP Location: Left arm, Patient Position: Sitting, BP Method: Medium (Manual))   Pulse 64   Ht 6' 1" (1.854 m)   Wt 89.4 kg (197 lb)   SpO2 98%   BMI 25.99 kg/m²      Physical Exam   Constitutional: He is oriented to person, place, and time. He appears well-developed and well-nourished.   HENT:   Head: " Normocephalic and atraumatic.   Eyes: Conjunctivae and EOM are normal. Pupils are equal, round, and reactive to light.   Neck: Neck supple. No thyromegaly present.   Cardiovascular: Normal rate, regular rhythm and normal heart sounds.    No murmur heard.  Pulmonary/Chest: Effort normal and breath sounds normal. No respiratory distress. He has no wheezes.   Abdominal: Soft. Bowel sounds are normal. He exhibits no distension. There is no tenderness.   Musculoskeletal: Normal range of motion.   Neurological: He is alert and oriented to person, place, and time.   Skin: Skin is warm and dry. No rash noted.   Psychiatric: He has a normal mood and affect. Judgment and thought content normal.   Vitals reviewed.      Assessment:       1. Health care maintenance    2. Long-term use of immunosuppressant medication    3. S/P cadaveric kidney transplant 11/5/2016. ESRD secondary to HTN/DMII    4. CKD (chronic kidney disease) stage 2, GFR 60-89 ml/min    5. Anemia of chronic disease    6. Chronic congestive heart failure, unspecified congestive heart failure type    7. Diabetic polyneuropathy associated with type 2 diabetes mellitus    8. Type 2 diabetes mellitus with stage 2 chronic kidney disease, with long-term current use of insulin    9. Vertigo        Plan:       Ravi was seen today for establish care and annual exam.    Diagnoses and all orders for this visit:    Health care maintenance  -     Case request GI: COLONOSCOPY    Long-term use of immunosuppressant medication  S/P cadaveric kidney transplant 11/5/2016. ESRD secondary to HTN/DMII  CKD (chronic kidney disease) stage 2, GFR 60-89 ml/min  Anemia of chronic disease  Managed by nephrology  Cr stable    Chronic congestive heart failure, unspecified congestive heart failure type  Stable, monitor    Diabetic polyneuropathy associated with type 2 diabetes mellitus  Type 2 diabetes mellitus with stage 2 chronic kidney disease, with long-term current use of insulin  -      Hemoglobin A1c; Future  -     Lipid panel; Future  -     Ambulatory referral to Optometry   Continue current insulin and tradgenta regimen  Will check with Dr. Ewing if okay to increase gabapentin dose. Okay to take gabapentin 300m in am and 2 at night rather than 1 TID    Vertigo  -     meclizine (ANTIVERT) 25 mg tablet; Take 1 tablet (25 mg total) by mouth 3 (three) times daily as needed for Dizziness.    -     melatonin 5 mg Tab; Take 1 tablet (5 mg total) by mouth every evening.

## 2017-11-06 ENCOUNTER — OFFICE VISIT (OUTPATIENT)
Dept: TRANSPLANT | Facility: CLINIC | Age: 62
End: 2017-11-06
Payer: MEDICARE

## 2017-11-06 VITALS
WEIGHT: 199.31 LBS | SYSTOLIC BLOOD PRESSURE: 148 MMHG | OXYGEN SATURATION: 100 % | HEIGHT: 73 IN | RESPIRATION RATE: 19 BRPM | TEMPERATURE: 98 F | HEART RATE: 81 BPM | BODY MASS INDEX: 26.42 KG/M2 | DIASTOLIC BLOOD PRESSURE: 88 MMHG

## 2017-11-06 DIAGNOSIS — Z94.0 IMMUNOSUPPRESSIVE MANAGEMENT ENCOUNTER FOLLOWING KIDNEY TRANSPLANT: ICD-10-CM

## 2017-11-06 DIAGNOSIS — Z94.0 S/P KIDNEY TRANSPLANT: ICD-10-CM

## 2017-11-06 DIAGNOSIS — Z79.4 TYPE 2 DIABETES MELLITUS WITH STAGE 2 CHRONIC KIDNEY DISEASE, WITH LONG-TERM CURRENT USE OF INSULIN: ICD-10-CM

## 2017-11-06 DIAGNOSIS — Z79.60 LONG-TERM USE OF IMMUNOSUPPRESSANT MEDICATION: ICD-10-CM

## 2017-11-06 DIAGNOSIS — F41.9 ANXIETY: ICD-10-CM

## 2017-11-06 DIAGNOSIS — N18.2 TYPE 2 DIABETES MELLITUS WITH STAGE 2 CHRONIC KIDNEY DISEASE, WITH LONG-TERM CURRENT USE OF INSULIN: ICD-10-CM

## 2017-11-06 DIAGNOSIS — E11.22 TYPE 2 DIABETES MELLITUS WITH STAGE 2 CHRONIC KIDNEY DISEASE, WITH LONG-TERM CURRENT USE OF INSULIN: ICD-10-CM

## 2017-11-06 DIAGNOSIS — I10 ESSENTIAL HYPERTENSION: ICD-10-CM

## 2017-11-06 DIAGNOSIS — N18.2 CKD (CHRONIC KIDNEY DISEASE) STAGE 2, GFR 60-89 ML/MIN: Primary | Chronic | ICD-10-CM

## 2017-11-06 DIAGNOSIS — F32.A DEPRESSION, UNSPECIFIED DEPRESSION TYPE: ICD-10-CM

## 2017-11-06 DIAGNOSIS — Z79.899 IMMUNOSUPPRESSIVE MANAGEMENT ENCOUNTER FOLLOWING KIDNEY TRANSPLANT: ICD-10-CM

## 2017-11-06 DIAGNOSIS — Z29.89 PROPHYLACTIC IMMUNOTHERAPY: ICD-10-CM

## 2017-11-06 DIAGNOSIS — N25.81 HYPERPARATHYROIDISM DUE TO RENAL INSUFFICIENCY: ICD-10-CM

## 2017-11-06 DIAGNOSIS — E78.5 HYPERLIPIDEMIA, UNSPECIFIED HYPERLIPIDEMIA TYPE: ICD-10-CM

## 2017-11-06 DIAGNOSIS — Z86.19 HISTORY OF HEPATITIS C: ICD-10-CM

## 2017-11-06 PROCEDURE — 99215 OFFICE O/P EST HI 40 MIN: CPT | Mod: S$PBB,,, | Performed by: INTERNAL MEDICINE

## 2017-11-06 PROCEDURE — 99213 OFFICE O/P EST LOW 20 MIN: CPT | Mod: PBBFAC | Performed by: INTERNAL MEDICINE

## 2017-11-06 PROCEDURE — 99999 PR PBB SHADOW E&M-EST. PATIENT-LVL III: CPT | Mod: PBBFAC,,, | Performed by: INTERNAL MEDICINE

## 2017-11-06 RX ORDER — FAMOTIDINE 20 MG/1
20 TABLET, FILM COATED ORAL NIGHTLY
Qty: 30 TABLET | Refills: 11 | Status: SHIPPED | OUTPATIENT
Start: 2017-11-06 | End: 2017-11-07 | Stop reason: SDUPTHER

## 2017-11-06 NOTE — LETTER
November 6, 2017        Alexi Glasgow  [address]  [address]  [facility] [address]  Phone: [phone]  Fax: [phone]             [facility] Misty- Transplant  [address]  [facility] [address]  Phone: [phone]   Patient: Ravi Zamorano   MR Number: [id number]   YOB: 1955   Date of Visit: 11/6/2017       Dear Dr. Alexi Glasgow    Thank you for referring Ravi Zamorano to me for evaluation. Attached you will find relevant portions of my assessment and plan of care.    If you have questions, please do not hesitate to call me. I look forward to following Ravi Zamorano along with you.    Sincerely,    Sylvie Frias MD    Enclosure    If you would like to receive this communication electronically, please contact [email or web address] or [phone] to request [employer] Link access.    [employer] Link is a tool which provides read-only access to select patient information with whom you have a relationship. Its easy to use and provides real time access to review your patients record including encounter summaries, notes, results, and demographic information.    If you feel you have received this communication in error or would no longer like to receive these types of communications, please e-mail [email or web address]

## 2017-11-06 NOTE — PATIENT INSTRUCTIONS
1. Make sure to see your general nephrologist   2. Stay active   3. Eat foods that are higher in phosphorus

## 2017-11-06 NOTE — PROGRESS NOTES
Kidney Post-Transplant Assessment    Referring Physician: Alexi Glasgow  Current Nephrologist: Alexi Glasgow    ORGAN: RIGHT KIDNEY  Donor Type:  - brain death  PHS Increased Risk: yes  Cold Ischemia: 314 mins  Induction Medications: thymoglobulin    Subjective:     CC:  Reassessment of renal allograft function and management of chronic immunosuppression.    Kidney History:  Mr. Zamorano is a 61 y.o. year old Black or  male with history of ESRD presumed secondary to DM/HTN who was on RRT since 5/6/15 until receiving PHS increased risk DDRT (THYMO induction given HCV positive donor and recipient, KDPI 85%, WIT 27 minutes, CIT 5 hours and 14 minutes, donor RPR positive thus recipient received PCN x3 doses; CMV D+/R+) on 16. He started Harvoni on 17 and has SVR; HCV PCR negative since 17. He has CKD stage 2 - GFR 60-89 and his baseline creatinine is between 1.1 to 1.4. He takes mycophenolate mofetil, prednisone and tacrolimus for maintenance immunosuppression.     Interval History: Patient last seen in transplant clinic on 17. Since last visit he denies any hospitalizations or ER visits. He will walk for 1.5 hours 2-3 times a week. He states his home BPs are in the 120s-140s/80s. He has been taking nifedipine 30 mg mostly in the evening when his SBP is >140.     Review of Systems  Constitutional: Negative for fever, appetite change and fatigue.    HENT: +intermittent vertigo; Negative for hearing loss, sore throat and mouth sores.   Eyes: +right eye floater; Negative for photophobia, pain and visual disturbance.   Respiratory: Negative for cough, chest tightness, shortness of breath and wheezing.   Cardiovascular: Negative for chest pain, palpitations and leg swelling.   Gastrointestinal: +very mild incisional pain - hasn't occurred in the last month; Negative for nausea, vomiting, abdominal pain, diarrhea, constipation, blood in stool and abdominal distention.  "  Genitourinary: Negative for dysuria, urgency, frequency, hematuria, decreased urine volume, difficulty urinating  Musculoskeletal: Negative for back pain, joint swelling, arthralgias and gait problem.   Skin: Negative for pallor, rash and wound.   Neurological: +mild tremors; Negative for dizziness, syncope, weakness, light-headedness and headaches.   Hematological: Negative for adenopathy. Does not bruise/bleed easily.   Psychiatric/Behavioral: Negative for confusion, sleep disturbance and dysphoric mood. The patient is not nervous/anxious.     Medications:   Current Outpatient Prescriptions   Medication Sig Dispense Refill    aspirin 81 MG Chew Take 81 mg by mouth once daily.      BD ULTRA-FINE LO PEN NEEDLES 32 gauge x 5/32" Ndle USE TO ADMINISTER INSULIN 4 TIMES DAILY. 100 each 11    blood sugar diagnostic Strp 1 strip by Misc.(Non-Drug; Combo Route) route 4 (four) times daily before meals and nightly. e11.65 100 strip 5    blood-glucose meter (FREESTYLE SYSTEM KIT) kit Use as instructed e11.65 may substitute meter as covered by insurance 1 each 0    carvedilol (COREG) 12.5 MG tablet TAKE 1 TABLET BY MOUTH TWICE DAILY 60 tablet 6    cholecalciferol, vitamin D3, 1,000 unit capsule Take 1 capsule (1,000 Units total) by mouth once daily.  0    famotidine (PEPCID) 20 MG tablet Take 1 tablet (20 mg total) by mouth every evening. 30 tablet 11    gabapentin (NEURONTIN) 300 MG capsule TAKE ONE CAPSULE BY MOUTH THREE TIMES A DAY 90 capsule 4    insulin detemir (LEVEMIR FLEXTOUCH) 100 unit/mL (3 mL) SubQ InPn pen Inject 35 units at night. 1 Box 4    insulin lispro (HUMALOG KWIKPEN) 100 unit/mL InPn pen Inject 18 units w/ breakfast, 16 units w/ lunch and dinner plus scale 150-200 +2, 201-250 +4, 251-300 +6, 301-350 +8. 2 Box 11    lancets Misc 1 each by Misc.(Non-Drug; Combo Route) route 4 (four) times daily before meals and nightly. e11.65 100 each 0    linagliptin (TRADJENTA) 5 mg Tab tablet Take 5 mg by " "mouth once daily.      magnesium oxide (MAG-OX) 400 mg tablet Take 1 tablet (400 mg total) by mouth 2 (two) times daily. 60 tablet 11    meclizine (ANTIVERT) 25 mg tablet Take 1 tablet (25 mg total) by mouth 3 (three) times daily as needed for Dizziness. 21 tablet 0    melatonin 5 mg Tab Take 1 tablet (5 mg total) by mouth every evening.      multivitamin (THERAGRAN) tablet Take 1 tablet by mouth once daily.      mycophenolate (CELLCEPT) 250 mg Cap Take 4 capsules (1,000 mg total) by mouth 2 (two) times daily. 240 capsule 11    nifedipine 30 MG ORAL TR24 (PROCARDIA-XL) 30 MG (OSM) 24 hr tablet Take 1 tablet (30 mg total) by mouth once daily. 30 tablet 11    ONETOUCH ULTRA TEST Strp Inject 1 each into the skin 4 (four) times daily. 200 each 6    predniSONE (DELTASONE) 5 MG tablet Take 1 tablet (5 mg total) by mouth once daily. 30 tablet 11    ropinirole (REQUIP) 0.5 MG tablet Take 1 tablet (0.5 mg total) by mouth once daily. 30 tablet 2    tacrolimus (PROGRAF) 1 MG Cap Take 2 capsules (2 mg total) by mouth every 12 (twelve) hours. Z94.0 Kidney Transplant 11/5/2016 120 capsule 11    tamsulosin (FLOMAX) 0.4 mg Cp24 Take 1 capsule (0.4 mg total) by mouth after dinner. 30 capsule 12    TRULICITY 0.75 mg/0.5 mL PnIj INJECT 0.5 MLS (0.75 MG TOTAL) INTO THE SKIN EVERY 7 DAYS. 2 mL 6    docusate sodium (COLACE) 100 MG capsule Take 1 capsule (100 mg total) by mouth 2 (two) times daily as needed for Constipation. 60 capsule 3     No current facility-administered medications for this visit.          Objective:     Blood pressure (!) 148/88, pulse 81, temperature 97.8 °F (36.6 °C), temperature source Oral, resp. rate 19, height 6' 1" (1.854 m), weight 90.4 kg (199 lb 4.7 oz), SpO2 100 %.body mass index is 26.29 kg/m².    Physical Exam  General: No acute distress, well groomed, alert and oriented x 3  HEENT: Normocephalic, atraumatic, EOM's intact bilaterally, external inspection of ears and nose normal, moist mucous " membranes, no oral ulcerations/lesions  Neck: Supple, symmetrical, trachea midline, no thyromegaly, no JVD  Respiratory: Clear to auscultation bilaterally, respirations unlabored, no rales/rhonchi/wheezing  Cardiovacular: Regular rate and rhythm, S1, S2 normal, no murmurs, rubs or gallops  Gastrointestinal: Soft, non-tender, bowel sounds normal, no hepatosplenomegaly  Renal allograft exam: No tenderness, no bruits, normal exam  Musculoskeletal: No knee or ankle joint tenderness or swelling.   Extremities: No clubbing or cyanosis of bilateral upper extremities; no lower extremity edema bilaterally, radial pulses 2+ bilaterally, symmetric  Skin: warm and dry; no rash on exposed skin  Neurologic: CN grossly intact    Labs:  Lab Results   Component Value Date    WBC 5.60 10/23/2017    HGB 12.4 (L) 10/23/2017    HCT 41.7 10/23/2017     10/30/2017    K 4.2 10/30/2017     10/30/2017    CO2 25 10/30/2017    BUN 14 10/30/2017    CREATININE 1.1 10/30/2017    EGFRNONAA >60.0 10/30/2017    CALCIUM 9.1 10/30/2017    PHOS 2.3 (L) 10/30/2017    MG 1.4 (L) 10/23/2017    ALBUMIN 3.4 (L) 10/30/2017    AST 19 10/23/2017    ALT 9 (L) 10/23/2017    UTPCR 0.22 (H) 10/23/2017    .0 (H) 02/05/2017    TACROLIMUS 8.8 10/23/2017       No results found for: EXTANC, EXTWBC, EXTSEGS, EXTPLATELETS, EXTHEMOGLOBI, EXTHEMATOCRI, EXTCREATININ, EXTSODIUM, EXTPOTASSIUM, EXTBUN, EXTCO2, EXTCALCIUM, EXTPHOSPHORU, EXTGLUCOSE, EXTALBUMIN, EXTAST, EXTALT, EXTBILITOTAL, EXTLIPASE, EXTAMYLASE    No results found for: EXTCYCLOSLVL, EXTSIROLIMUS, EXTTACROLVL, EXTPROTCRE, EXTPTHINTACT, EXTPROTEINUA, EXTWBCUA, EXTRBCUA    Labs were reviewed with the patient.    Assessment/Plan:     1. CKD (chronic kidney disease) stage 2, GFR 60-89 ml/min    2. History of hepatitis C, s/p successful Rx w/ SVR24 - 9/2017    3. Hyperparathyroidism due to renal insufficiency    4. S/P cadaveric kidney transplant 11/5/2016. ESRD secondary to HTN/DMII    5.  Hyperlipidemia, unspecified hyperlipidemia type    6. Essential hypertension    7. Type 2 diabetes mellitus with stage 2 chronic kidney disease, with long-term current use of insulin    8. Prophylactic immunotherapy    9. Long-term use of immunosuppressant medication    10. Anxiety    11. Depression, unspecified depression type    12. Immunosuppressive management encounter following kidney transplant        Mr. Zamorano is a 61 y.o. male with:     # History of ESRD presumed secondary to DM/HTN who was on RRT since 5/6/15 until receiving PHS increased risk DDRT (THYMO induction given HCV positive donor and recipient, KDPI 85%, WIT 27 minutes, CIT 5 hours and 14 minutes, donor RPR positive thus recipient received PCN x3 doses; CMV D+/R+) on 11/5/16: baseline Cr 1.1 to 1.4  - last Cr within baseline at 1.1 from 10/30/17  - last UPC 0.22 gm from 10/23/17  - change current nephrologist in [facility] to Dr. Ewing     # Immunosuppression:   - continue Prograf 2 mg BID, last FK-506 level 8.8 from 10/23/17  - continue MMF 1000 mg BID   - continue prednisone 5 mg daily  - continue to monitor for toxicities from immunosuppressive medications     # Infectious Surveillance:   - last BK serum PCR negative from 10/23/17  - last CMV PCR negative from 3/8/17    # HTN: BPs stable  - continue current anti-hypertensive regimen  - defer adjustments to BP meds to his general nephrologist  - continue with home blood pressure monitoring  - low salt and healthy life discussed with the patient     # Metabolic Bone Disease/Secondary Hyperparathyroidism: last calcium normal but phos low   - counseled patient on higher phos diet   - he preferred to do dietary adjustments and not start KPN at this time  - will monitor PTH, calcium, and phosphorus as per our center protocol    # Hypomagnesemia: Mg level on lower end at 1.4   - continue MagOx 400 mg BID - if Mg remains on lower end would uptitrate MagOx dose     # DM: last HbA1c 7.7% from 11/5/16  -  counseled patient on importance of optimizing glycemic control   - continue following up with endocrinology     # Anemia of chronic disease: Hb stable at 12.4  - will continue monitoring as per our center guidelines. No indication for acute intervention today     # Hepatitis C: genotype 1a; started Harvoni on 1/12/17; HCV PCR negative since 2/8/17; last HCV PCR negative from 9/29/17     # Vitamin D Deficiency: vitamin D level improved from 15 (11/8/16) --> 39 (2/5/17) thus discontinued ergocalciferol and started cholecalciferol 1000 units daily   - continue cholecalciferol 1000 units daily     Follow-up:   Clinic: return to transplant clinic weekly for the first month after transplant; every 2 weeks during months 2-3; then at 6-, 9-, 12-, 18-, 24-, and 36- months post-transplant to reassess for complications from immunosuppression toxicity and monitor for rejection.  Annually thereafter.    Labs: since patient remains at high risk for rejection and drug-related complications that warrant close monitoring, labs will be ordered as follows: continue twice weekly CBC, renal panel, and drug level for first month; then same labs once weekly through 3rd month post-transplant.  Urine for UA and protein/creatinine ratio monthly.  Serum BK - PCR at 1-, 3-, 6-, 9-, 12-, 18-, 24-, and 36- months post-transplant.  Hepatic panel at 1-, 2-, 3-, 6-, 9-, 12-, 18-, 24-, and 36- months post-transplant.    Sylvie Frias MD       Education:   Material provided to the patient.  Patient reminded to call with any health changes since these can be early signs of significant complications.  Also, I advised the patient to be sure any new medications or changes of old medications are discussed with either a pharmacist or physician knowledgeable with transplant to avoid rejection/drug toxicity related to significant drug interactions.    UNOS Patient Status  Functional Status: 100% - Normal, no complaints, no evidence of disease  Physical  Capacity: No Limitations

## 2017-11-06 NOTE — TELEPHONE ENCOUNTER
----- Message from Yesy Gerber sent at 11/6/2017  8:17 AM CST -----  Contact: Patient [phone]  Prescription Request:     Name of medication: famotidine (PEPCID) 20 MG tablet    Reason for request: Refill    Pharmacy: [facility] - [place], [place] - [address]  Please advise.    Thank You

## 2017-11-07 ENCOUNTER — TELEPHONE (OUTPATIENT)
Dept: INTERNAL MEDICINE | Facility: CLINIC | Age: 62
End: 2017-11-07

## 2017-11-07 RX ORDER — FAMOTIDINE 20 MG/1
20 TABLET, FILM COATED ORAL NIGHTLY
Qty: 90 TABLET | Refills: 3 | Status: SHIPPED | OUTPATIENT
Start: 2017-11-07

## 2017-11-27 ENCOUNTER — TELEPHONE (OUTPATIENT)
Dept: INTERNAL MEDICINE | Facility: CLINIC | Age: 62
End: 2017-11-27

## 2017-11-27 ENCOUNTER — OFFICE VISIT (OUTPATIENT)
Dept: OPTOMETRY | Facility: CLINIC | Age: 62
End: 2017-11-27
Payer: COMMERCIAL

## 2017-11-27 DIAGNOSIS — Z01.00 EYE EXAM, ROUTINE: Primary | ICD-10-CM

## 2017-11-27 DIAGNOSIS — H52.223 HYPEROPIA WITH REGULAR ASTIGMATISM AND PRESBYOPIA, BILATERAL: ICD-10-CM

## 2017-11-27 DIAGNOSIS — H52.03 HYPEROPIA WITH REGULAR ASTIGMATISM AND PRESBYOPIA, BILATERAL: ICD-10-CM

## 2017-11-27 DIAGNOSIS — H52.4 HYPEROPIA WITH REGULAR ASTIGMATISM AND PRESBYOPIA, BILATERAL: ICD-10-CM

## 2017-11-27 PROBLEM — E11.3393 TYPE 2 DIABETES MELLITUS WITH BOTH EYES AFFECTED BY MODERATE NONPROLIFERATIVE RETINOPATHY WITHOUT MACULAR EDEMA, WITHOUT LONG-TERM CURRENT USE OF INSULIN: Status: ACTIVE | Noted: 2017-11-27

## 2017-11-27 PROCEDURE — 92015 DETERMINE REFRACTIVE STATE: CPT | Mod: S$GLB,,, | Performed by: OPTOMETRIST

## 2017-11-27 PROCEDURE — 99999 PR PBB SHADOW E&M-EST. PATIENT-LVL III: CPT | Mod: PBBFAC,,, | Performed by: OPTOMETRIST

## 2017-11-27 PROCEDURE — 92004 COMPRE OPH EXAM NEW PT 1/>: CPT | Mod: S$GLB,,, | Performed by: OPTOMETRIST

## 2017-11-27 NOTE — PROGRESS NOTES
HPI      is here for annual diabetic eye exam.  Blur at distance and near  Hemoglobin A1C       Date                     Value               Ref Range             Status                11/05/2016               7.7 (H)             4.5 - 6.2 %           Final                  10/23/2014               7.5 (H)             4.5 - 6.2 %           Final                 07/29/2014               7.2 (H)             4.5 - 6.2 %           Final              (-)Flashes (+)Floaters  (+)Itch, (+)tear, (+)burn, (+)Dryness. (--) OTC Drops   (-)Photophobia  (-)Glare (-)diplopia (-) headaches          Last edited by Gabriel Radford, OD on 11/27/2017  8:30 AM. (History)            Assessment /Plan     For exam results, see Encounter Report.    Eye exam, routine  -Moderate Diabetic Retinopathy noted today, no ocular treatment necessary.  Continued blood sugar control with primary care physician and monitor at 6 mo dilated fundus exam.  -GLC suspect HVF and OCT at 6 mo    Hyperopia with regular astigmatism and presbyopia, bilateral  Eyeglass Final Rx     Eyeglass Final Rx       Sphere Cylinder Axis Dist VA Add    Right +1.00 +0.50 005 20/25 +2.50    Left +1.00 +0.50 180 20/20 +2.50    Type:  PAL    Expiration Date:  11/28/2018                  RTC 6 mo

## 2017-11-27 NOTE — LETTER
November 27, 2017      Mima Mack MD  [address]  [facility] [address]           [facility] - [facility]  [address]  [facility] [address]  Phone: [phone]  Fax: [phone]          Patient: Ravi Zamorano   MR Number: [id number]   YOB: 1955   Date of Visit: 11/27/2017       Dear Dr. Mima Mack:    Thank you for referring Ravi Zamorano to me for evaluation. Attached you will find relevant portions of my assessment and plan of care.    If you have questions, please do not hesitate to call me. I look forward to following Ravi Zamorano along with you.    Sincerely,    Gabriel Radford, OD    Enclosure  CC:  No Recipients    If you would like to receive this communication electronically, please contact [email or web address] or [phone] to request more information on [email or web address] Link access.    For providers and/or their staff who would like to refer a patient to [facility], please contact us through our one-stop-shop provider referral line, [facility], at [phone].    If you feel you have received this communication in error or would no longer like to receive these types of communications, please e-mail [email or web address]

## 2017-11-27 NOTE — TELEPHONE ENCOUNTER
Please call patient.  His cholesterol and blood sugar levels look great.  A1c 6.8%, total cholesterol is 148, bad cholesterol is 78. All of these are in the goal range. Keep up the good work!

## 2017-11-30 RX ORDER — LANOLIN ALCOHOL/MO/W.PET/CERES
CREAM (GRAM) TOPICAL
Qty: 60 TABLET | Refills: 3 | OUTPATIENT
Start: 2017-11-30

## 2017-12-12 ENCOUNTER — CLINICAL SUPPORT (OUTPATIENT)
Dept: SMOKING CESSATION | Facility: CLINIC | Age: 62
End: 2017-12-12
Payer: COMMERCIAL

## 2017-12-12 DIAGNOSIS — F17.200 NICOTINE DEPENDENCE: Primary | ICD-10-CM

## 2017-12-12 PROCEDURE — 99406 BEHAV CHNG SMOKING 3-10 MIN: CPT | Mod: S$GLB,,, | Performed by: INTERNAL MEDICINE

## 2017-12-19 RX ORDER — ROPINIROLE 0.5 MG/1
TABLET, FILM COATED ORAL
Qty: 30 TABLET | Refills: 2 | Status: SHIPPED | OUTPATIENT
Start: 2017-12-19 | End: 2018-05-07

## 2017-12-21 ENCOUNTER — TELEPHONE (OUTPATIENT)
Dept: TRANSPLANT | Facility: CLINIC | Age: 62
End: 2017-12-21

## 2017-12-21 NOTE — TELEPHONE ENCOUNTER
----- Message from Jhon Rosas sent at 12/21/2017  8:21 AM CST -----  Contact: Pt  Pt calling to speak with Kena and see if he can take anything for his cough, cough drops and cough syrup.          Call back number: [phone]

## 2017-12-21 NOTE — TELEPHONE ENCOUNTER
Patient contacted, advised of medication he can take, encouraged to seek medical attention if systems worsen or for a fever.  Patient verbalized understanding.

## 2018-01-23 RX ORDER — MYCOPHENOLATE MOFETIL 250 MG/1
1000 CAPSULE ORAL 2 TIMES DAILY
Qty: 240 CAPSULE | Refills: 11 | Status: SHIPPED | OUTPATIENT
Start: 2018-01-23 | End: 2019-11-11 | Stop reason: SDUPTHER

## 2018-01-23 NOTE — TELEPHONE ENCOUNTER
----- Message from Jhon Rosas sent at 1/23/2018 10:50 AM CST -----  Contact: Pt  Pt calling to get a refill on mycophenolate (CELLCEPT) 250 mg Cap.     Pt would like the prescription sent to [facility] [phone].        Call back number: [phone]

## 2018-02-14 RX ORDER — INSULIN LISPRO 100 [IU]/ML
INJECTION, SOLUTION INTRAVENOUS; SUBCUTANEOUS
Qty: 30 ML | Refills: 4 | Status: SHIPPED | OUTPATIENT
Start: 2018-02-14 | End: 2018-05-07 | Stop reason: SDUPTHER

## 2018-02-19 RX ORDER — TACROLIMUS 1 MG/1
2 CAPSULE ORAL EVERY 12 HOURS
Qty: 180 CAPSULE | Refills: 11 | Status: SHIPPED | OUTPATIENT
Start: 2018-02-19 | End: 2018-02-20 | Stop reason: SDUPTHER

## 2018-02-20 ENCOUNTER — TELEPHONE (OUTPATIENT)
Dept: INTERNAL MEDICINE | Facility: CLINIC | Age: 63
End: 2018-02-20

## 2018-02-20 RX ORDER — TACROLIMUS 1 MG/1
2 CAPSULE ORAL EVERY 12 HOURS
Qty: 120 CAPSULE | Refills: 11 | Status: SHIPPED | OUTPATIENT
Start: 2018-02-20 | End: 2018-05-09 | Stop reason: SDUPTHER

## 2018-02-20 NOTE — TELEPHONE ENCOUNTER
----- Message from Patria Manriquez RN sent at 2/19/2018  1:48 PM CST -----  Contact: Patient      ----- Message -----  From: Yamile Jenkins  Sent: 2/19/2018   1:41 PM  To: Rodriguez Ferreira    Ravi, patient [phone], calling for a refill on Rx tacrolimus (PROGRAF) 1 MG Cap.  Please send refill to pharmacy and advise patient. Thanks.  [facility] [place] -  [address]  [place] [address]  Phone: [phone] Fax: [phone]

## 2018-03-09 ENCOUNTER — TELEPHONE (OUTPATIENT)
Dept: UROLOGY | Facility: CLINIC | Age: 63
End: 2018-03-09

## 2018-03-09 DIAGNOSIS — R35.1 NOCTURIA MORE THAN TWICE PER NIGHT: ICD-10-CM

## 2018-03-09 RX ORDER — TAMSULOSIN HYDROCHLORIDE 0.4 MG/1
CAPSULE ORAL
Qty: 30 CAPSULE | Refills: 1 | Status: SHIPPED | OUTPATIENT
Start: 2018-03-09 | End: 2018-05-09 | Stop reason: SDUPTHER

## 2018-04-09 ENCOUNTER — LAB VISIT (OUTPATIENT)
Dept: LAB | Facility: HOSPITAL | Age: 63
End: 2018-04-09
Payer: COMMERCIAL

## 2018-04-09 ENCOUNTER — OFFICE VISIT (OUTPATIENT)
Dept: UROLOGY | Facility: CLINIC | Age: 63
End: 2018-04-09
Payer: COMMERCIAL

## 2018-04-09 VITALS
HEIGHT: 73 IN | SYSTOLIC BLOOD PRESSURE: 128 MMHG | BODY MASS INDEX: 26.91 KG/M2 | HEART RATE: 72 BPM | DIASTOLIC BLOOD PRESSURE: 81 MMHG | WEIGHT: 203.06 LBS

## 2018-04-09 DIAGNOSIS — E11.42 TYPE 2 DIABETES MELLITUS WITH POLYNEUROPATHY: ICD-10-CM

## 2018-04-09 DIAGNOSIS — N52.01 ERECTILE DYSFUNCTION DUE TO ARTERIAL INSUFFICIENCY: ICD-10-CM

## 2018-04-09 DIAGNOSIS — N40.1 BPH WITH URINARY OBSTRUCTION: ICD-10-CM

## 2018-04-09 DIAGNOSIS — R35.1 NOCTURIA MORE THAN TWICE PER NIGHT: ICD-10-CM

## 2018-04-09 DIAGNOSIS — N40.1 BPH WITH URINARY OBSTRUCTION: Primary | ICD-10-CM

## 2018-04-09 DIAGNOSIS — N13.8 BPH WITH URINARY OBSTRUCTION: ICD-10-CM

## 2018-04-09 DIAGNOSIS — E78.5 HYPERLIPIDEMIA, UNSPECIFIED HYPERLIPIDEMIA TYPE: ICD-10-CM

## 2018-04-09 DIAGNOSIS — N13.8 BPH WITH URINARY OBSTRUCTION: Primary | ICD-10-CM

## 2018-04-09 LAB — COMPLEXED PSA SERPL-MCNC: 1.7 NG/ML

## 2018-04-09 PROCEDURE — 3074F SYST BP LT 130 MM HG: CPT | Mod: CPTII,S$GLB,, | Performed by: NURSE PRACTITIONER

## 2018-04-09 PROCEDURE — 84153 ASSAY OF PSA TOTAL: CPT

## 2018-04-09 PROCEDURE — 36415 COLL VENOUS BLD VENIPUNCTURE: CPT

## 2018-04-09 PROCEDURE — 3079F DIAST BP 80-89 MM HG: CPT | Mod: CPTII,S$GLB,, | Performed by: NURSE PRACTITIONER

## 2018-04-09 PROCEDURE — 99214 OFFICE O/P EST MOD 30 MIN: CPT | Mod: SA,S$GLB,, | Performed by: NURSE PRACTITIONER

## 2018-04-09 PROCEDURE — 99999 PR PBB SHADOW E&M-EST. PATIENT-LVL IV: CPT | Mod: PBBFAC,,, | Performed by: NURSE PRACTITIONER

## 2018-04-09 RX ORDER — ATORVASTATIN CALCIUM 10 MG/1
TABLET, FILM COATED ORAL
Qty: 30 TABLET | Refills: 6 | OUTPATIENT
Start: 2018-04-09

## 2018-04-09 NOTE — TELEPHONE ENCOUNTER
----- Message from Leslie Zarco sent at 4/9/2018 10:27 AM CDT -----  Contact: pt   OUT OF MED   -     PT IS IN MAIN BUILDING AT PRESENT   10:31     insulin detemir (LEVEMIR FLEXTOUCH) 100 unit/mL (3 mL) [facility]  Pharmacy   =-  Main  Bl    Thanks

## 2018-04-09 NOTE — PATIENT INSTRUCTIONS
Understanding Erectile Dysfunction    Erectile dysfunction (ED) is a problem getting an erection firm enough or keeping it long enough for intercourse. The problem can happen to any man at any age. But health problems that can lead to ED become more common as a man ages. Up to half of men over age 40 experience ED at some point.  Causes of ED  ED can have many causes. Most are physical. Some are emotional issues. Often, a combination of causes is involved. Causes of ED may include:  · Medical conditions such as diabetes or depression  · Smoking tobacco or marijuana  · Drinking too much alcohol  · Side effects of medications  · Injury to nerves or blood vessels  · Emotional issues such as stress or relationship problems  ED can be treated  Prescription medications for ED are available. They help many men who try them. Depending upon the cause of the ED, though, medications may not be enough. In these cases, other treatment options are available. These include erectile aids and surgery. Your health care provider can tell you more about the treatment that is right for you. And new treatments for ED are being studied. No matter what the treatment you decide on, stay in touch with your doctor. If your symptoms persist, he or she may be able to adjust your current treatment or try something new.  Date Last Reviewed: 1/1/2017  © 4986-0853 [employer]. [address] [place] [address], [place] [address]. All rights reserved. This information is not intended as a substitute for professional medical care. Always follow your healthcare professional's instructions.        BPH (Enlarged Prostate)  The prostate is a gland at the base of the bladder. As some men get older, the prostate may get bigger in size. This problem is called benign prostatic hyperplasia (BPH). BPH puts pressure on the urethra. This is the tube that carries urine from the bladder to the penis. It may interfere with the flow of urine. It may also keep  the bladder from emptying fully.    Symptoms of BPH include trouble starting urination and feeling as though the bladder isnt emptying all the way. It also includes a weak urine stream, dribbling and leaking of urine, and frequent and urgent urination (especially at night). BPH can increase the risk of urinary infections. It can also block off urine flow completely. If this occurs, a thin tube (catheter) may be passed into the bladder to help drain urine.  If symptoms are mild, no treatment may be needed right now. If symptoms are more severe, treatment is likely needed. The goal of treatment is to improve urine flow and reduce symptoms. Treatments can include medicine and procedures. Your healthcare provider will discuss treatment options with you as needed.  Home care  The following guidelines will help you care for yourself at home:  · Urinate as soon as you feel the urge. Don't try to hold your urine.  · Don't limit your fluid intake during the day. Drink 6 to 8 glasses of water or liquids a day. This prevents bacteria from building up in the bladder.  · Avoid drinking fluids after dinner to help reduce urination during the night.  · Avoid medicines that can worsen your symptoms. These include certain cold and allergy medicines and antidepressants. Diuretics used for high blood pressure can also worsen symptoms. Talk to your doctor about the medicines you take. Other choices may work better for you.  Prostate cancer screening  BPH does not increase the risk of prostate cancer. But because prostate cancer is a common cancer in men, screening is sometimes recommended. This may help detect the cancer in its early stages when treatment is most effective. Factors that can increase the risk of prostate cancer include being -American or having a father or brother who had prostate cancer. A high-fat diet may also increase the risk of prostate cancer. Talk to your healthcare provider to see whether you should be  screened for prostate cancer.  Follow-up care  Follow up with your healthcare provider, or as advised  To learn more, go to:  · National Kidney & Urologic Diseases Information Clearinghouse  kidney.niddk.nih.gov, 140.195.5548  When to seek medical advice  Call your healthcare provider right away if any of these occur:  · Fever of 100.4°F (38.0°C) or higher, or as advised  · Unable to pass urine for 8 hours  · Increasing pressure or pain in your bladder (lower abdomen)  · Blood in the urine  · Increasing low back pain, not related to injury  · Symptoms of urinary infection (increased urge to urinate, burning when passing urine, foul-smelling urine)  Date Last Reviewed: 7/1/2016  © 9736-4400 [employer]. [address] [place] [address], [place] [address]. All rights reserved. This information is not intended as a substitute for professional medical care. Always follow your healthcare professional's instructions.

## 2018-04-09 NOTE — PROGRESS NOTES
Subjective:       Patient ID: Ravi Zamorano is a 62 y.o. male.    Chief Complaint: Follow-up; Benign Prostatic Hypertrophy; and Urinary Frequency    Ravi Zamorano is a 62 y.o. 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  He has had diabetes for 16 years.  11/27/2017 Hgb A1c was 6.8    He was last seen in clinic 02/14/2017 with Dr. Mckeon for nocturia. Patient referred from Risa Blackman*.   Patient states this was a problem since before his kidney transplant.   Nocturia every 1 hour currently. This has been present for 4-5 years at least  Urinary frequency every 2-3 hours. No urgency, urge incontinence.   He was started on Flomax at last visit.    He states FOS is good.     He getting up every 2 hours but ok with how things are.   He drinks plenty of water during day and evening.    Some changes in ED.   No as strong as before.  He has tried oral agents in the past but they no longer working.      No recurrent UTI.   No family hx of prostate cancer.                           PSA                      2.0                 10/24/2016                 PSA                      1.4                 07/29/2014                  Past Medical History:  7/29/2014: Anxiety  12/28/2016: CKD (chronic kidney disease) stage 2, GFR 60-8*  7/29/2014: CKD (chronic kidney disease) stage 4, GFR 15-2*  7/29/2014: Depression - situational  No date: Diabetes mellitus  7/29/2014: Diabetes type 2 since 2000 7/29/2014: Diabetic neuropathy  7/29/2014: History of hepatitis C, s/p successful Rx w/ S*      Comment: Genotype 1a, treatment naive 10/2014 liver                biopsy - grade 1 / stage 1 Completed Familia w/               SVR  7/29/2014: Hyperlipidemia  No date: Hypertension  No date: Neuropathy  7/29/2014: Organ transplant candidate  No date: Pancreatitis    Past Surgical History:  No date: APPENDECTOMY  No date: KIDNEY TRANSPLANT    Review of patient's family history indicates:    Diabetes          "              Mother                    Hypertension                   Mother                    Heart disease                  Mother                      Comment: CAD with PCI    Heart disease                  Father                    Cancer                         Brother                     Comment: one sister with breast cancer    Hypertension                   Brother                     Comment: one sister with HTN and borderline DM    Stroke                         Neg Hx                    Kidney disease                 Neg Hx                      Social History    Marital status:              Spouse name:                       Years of education:                 Number of children:               Occupational History  Occupation          Employer            Comment                                   [employer] *     Social History Main Topics    Smoking status: Former Smoker                                                                Packs/day: 0.50      Years: 32.00          Types: Cigarettes       Quit date: 11/28/2016    Smokeless tobacco: Never Used                        Comment: Pt reports that he quit in 2013, but started             up again in 2015. pt reports he is currently             working on quitting again    Alcohol use: Yes                Comment: Pt reports occasional beers on Sundays.                 Pt reports drinking daily prior to ESRD                 diagnosis.    Drug use: No              Sexual activity: Yes               Partners with: Female    Other Topics            Concern    None on file    Social History Narrative     for 14 years     for 29 years    2 children ages 35, 36        Allergies:  Patient has no known allergies.    Medications:  Current Outpatient Prescriptions:   aspirin 81 MG Chew, Take 81 mg by mouth once daily., Disp: , Rfl:   BD ULTRA-FINE LO PEN NEEDLES 32 gauge x 5/32" Ndle, USE TO ADMINISTER INSULIN 4 TIMES DAILY., " Disp: 100 each, Rfl: 11  blood sugar diagnostic Strp, 1 strip by Misc.(Non-Drug; Combo Route) route 4 (four) times daily before meals and nightly. e11.65, Disp: 100 strip, Rfl: 5  blood-glucose meter (FREESTYLE SYSTEM KIT) kit, Use as instructed e11.65 may substitute meter as covered by insurance, Disp: 1 each, Rfl: 0  carvedilol (COREG) 12.5 MG tablet, TAKE 1 TABLET BY MOUTH TWICE DAILY, Disp: 60 tablet, Rfl: 6  cholecalciferol, vitamin D3, 1,000 unit capsule, Take 1 capsule (1,000 Units total) by mouth once daily., Disp: , Rfl: 0  docusate sodium (COLACE) 100 MG capsule, Take 1 capsule (100 mg total) by mouth 2 (two) times daily as needed for Constipation., Disp: 60 capsule, Rfl: 3  famotidine (PEPCID) 20 MG tablet, Take 1 tablet (20 mg total) by mouth every evening., Disp: 90 tablet, Rfl: 3  gabapentin (NEURONTIN) 300 MG capsule, TAKE ONE CAPSULE BY MOUTH THREE TIMES A DAY, Disp: 90 capsule, Rfl: 4  insulin detemir (LEVEMIR FLEXTOUCH) 100 unit/mL (3 mL) SubQ InPn pen, Inject 35 units at night., Disp: 1 Box, Rfl: 4  insulin lispro (HUMALOG KWIKPEN) 100 unit/mL InPn pen, Inject 18 units w/ breakfast, 16 units w/ lunch and dinner plus scale 150-200 +2, 201-250 +4, 251-300 +6, 301-350 +8., Disp: 2 Box, Rfl: 11  insulin lispro (HUMALOG KWIKPEN) 100 unit/mL InPn pen, Inject 16-18 units w/ meals plus scale, 4 units w/ bedtime snack. Need an appt., Disp: 30 mL, Rfl: 4  lancets Misc, 1 each by Misc.(Non-Drug; Combo Route) route 4 (four) times daily before meals and nightly. e11.65, Disp: 100 each, Rfl: 0  linagliptin (TRADJENTA) 5 mg Tab tablet, Take 5 mg by mouth once daily., Disp: , Rfl:   meclizine (ANTIVERT) 25 mg tablet, Take 1 tablet (25 mg total) by mouth 3 (three) times daily as needed for Dizziness., Disp: 21 tablet, Rfl: 0  melatonin 5 mg Tab, Take 1 tablet (5 mg total) by mouth every evening., Disp: , Rfl:   multivitamin (THERAGRAN) tablet, Take 1 tablet by mouth once daily., Disp: , Rfl:    mycophenolate (CELLCEPT) 250 mg Cap, Take 4 capsules (1,000 mg total) by mouth 2 (two) times daily., Disp: 240 capsule, Rfl: 11  nifedipine 30 MG ORAL TR24 (PROCARDIA-XL) 30 MG (OSM) 24 hr tablet, Take 1 tablet (30 mg total) by mouth once daily., Disp: 30 tablet, Rfl: 11  ONETOUCH ULTRA TEST Strp, Inject 1 each into the skin 4 (four) times daily., Disp: 200 each, Rfl: 6  predniSONE (DELTASONE) 5 MG tablet, Take 1 tablet (5 mg total) by mouth once daily., Disp: 30 tablet, Rfl: 11  rOPINIRole (REQUIP) 0.5 MG tablet, TAKE 1 CAPSULE BY MOUTH ONCE DAILY, Disp: 30 tablet, Rfl: 2  tacrolimus (PROGRAF) 1 MG Cap, Take 2 capsules (2 mg total) by mouth every 12 (twelve) hours., Disp: 120 capsule, Rfl: 11  tamsulosin (FLOMAX) 0.4 mg Cp24, TAKE 1 CAPSULE BY MOUTH AFTER DINNER., Disp: 30 capsule, Rfl: 1  TRULICITY 0.75 mg/0.5 mL PnIj, INJECT 0.5 MLS (0.75 MG TOTAL) INTO THE SKIN EVERY 7 DAYS., Disp: 2 mL, Rfl: 6        Review of Systems   Constitutional: Negative for activity change, appetite change, chills and fever.   HENT: Negative for facial swelling and trouble swallowing.    Eyes: Negative for visual disturbance.   Respiratory: Negative for chest tightness and shortness of breath.    Cardiovascular: Negative for chest pain and palpitations.   Gastrointestinal: Negative.  Negative for abdominal pain, constipation, diarrhea, nausea and vomiting.   Genitourinary: Positive for frequency and nocturia. Negative for difficulty urinating, dysuria, flank pain, hematuria, penile pain, penile swelling, scrotal swelling and testicular pain.   Musculoskeletal: Negative for back pain, gait problem, myalgias and neck stiffness.   Skin: Negative for rash.   Neurological: Negative for dizziness and speech difficulty.   Hematological: Does not bruise/bleed easily.   Psychiatric/Behavioral: Negative for behavioral problems.       Objective:      Physical Exam   Nursing note and vitals reviewed.  Constitutional: He is oriented to  person, place, and time. Vital signs are normal. He appears well-developed and well-nourished. He is active and cooperative.  Non-toxic appearance. He does not have a sickly appearance.   Urinated in lobby; unable to give sample today.     HENT:   Head: Normocephalic and atraumatic.   Right Ear: External ear normal.   Left Ear: External ear normal.   Nose: Nose normal.   Mouth/Throat: Mucous membranes are normal.   Eyes: Conjunctivae and lids are normal. No scleral icterus.   Neck: Trachea normal, normal range of motion and full passive range of motion without pain. Neck supple. No JVD present. No tracheal deviation present.   Cardiovascular: Normal rate, S1 normal and S2 normal.    Pulmonary/Chest: Effort normal. No respiratory distress. He exhibits no tenderness.   Abdominal: Soft. Normal appearance and bowel sounds are normal. There is no hepatosplenomegaly. There is no tenderness. There is no CVA tenderness.   Genitourinary: Rectum normal, testes normal and penis normal. Prostate is enlarged. Right testis shows no mass, no swelling and no tenderness. Left testis shows no mass, no swelling and no tenderness. Circumcised. No hypospadias, penile erythema or penile tenderness. No discharge found.       Musculoskeletal: Normal range of motion.   Lymphadenopathy: No inguinal adenopathy noted on the right or left side.   Neurological: He is alert and oriented to person, place, and time. He has normal strength.   Skin: Skin is warm, dry and intact.     Psychiatric: He has a normal mood and affect. His behavior is normal. Judgment and thought content normal.       Assessment:       1. BPH with urinary obstruction    2. Nocturia more than twice per night    3. Erectile dysfunction due to arterial insufficiency        Plan:         I spent 25 minutes with the patient of which more than half was spent in direct consultation with the patient in regards to our treatment and plan.    Education and recommendations of today's  plan of care including home remedies.   We discussed his labs and contributory factors for his LUTS;   Diet modifications; reducing bladder irritants;  Will continue Flomax as ordered; no interested in any more meds; no anticholinergics;  We discussed ED and contributory factors;   1st, 2nd, 3rd line therapies reviewed.  Since failed oral agents, we discussed 2nd line BETTY and ICI; IPP also discussed.  He going to monitor ED; I gave him educational materials on the treatments; he will let me know.  Update PSA today.  RTC one year.

## 2018-04-24 DIAGNOSIS — Z94.0 KIDNEY REPLACED BY TRANSPLANT: Primary | ICD-10-CM

## 2018-04-24 RX ORDER — ATORVASTATIN CALCIUM 10 MG/1
10 TABLET, FILM COATED ORAL DAILY
COMMUNITY
Start: 2018-03-11 | End: 2019-01-15 | Stop reason: SDUPTHER

## 2018-05-01 ENCOUNTER — LAB VISIT (OUTPATIENT)
Dept: LAB | Facility: HOSPITAL | Age: 63
End: 2018-05-01
Attending: INTERNAL MEDICINE
Payer: COMMERCIAL

## 2018-05-01 DIAGNOSIS — Z94.0 KIDNEY REPLACED BY TRANSPLANT: ICD-10-CM

## 2018-05-01 LAB
ALBUMIN SERPL BCP-MCNC: 3.8 G/DL
ALBUMIN SERPL BCP-MCNC: 3.8 G/DL
ALP SERPL-CCNC: 68 U/L
ALT SERPL W/O P-5'-P-CCNC: 9 U/L
ANION GAP SERPL CALC-SCNC: 11 MMOL/L
AST SERPL-CCNC: 18 U/L
BASOPHILS # BLD AUTO: 0.04 K/UL
BASOPHILS NFR BLD: 0.7 %
BILIRUB DIRECT SERPL-MCNC: 0.2 MG/DL
BILIRUB SERPL-MCNC: 0.4 MG/DL
BUN SERPL-MCNC: 14 MG/DL
CALCIUM SERPL-MCNC: 9.4 MG/DL
CHLORIDE SERPL-SCNC: 103 MMOL/L
CO2 SERPL-SCNC: 27 MMOL/L
CREAT SERPL-MCNC: 1 MG/DL
DIFFERENTIAL METHOD: ABNORMAL
EOSINOPHIL # BLD AUTO: 0.1 K/UL
EOSINOPHIL NFR BLD: 1.4 %
ERYTHROCYTE [DISTWIDTH] IN BLOOD BY AUTOMATED COUNT: 15.9 %
EST. GFR  (AFRICAN AMERICAN): >60 ML/MIN/1.73 M^2
EST. GFR  (NON AFRICAN AMERICAN): >60 ML/MIN/1.73 M^2
GLUCOSE SERPL-MCNC: 176 MG/DL
HCT VFR BLD AUTO: 44 %
HGB BLD-MCNC: 13.5 G/DL
IMM GRANULOCYTES # BLD AUTO: 0.01 K/UL
IMM GRANULOCYTES NFR BLD AUTO: 0.2 %
LYMPHOCYTES # BLD AUTO: 1.1 K/UL
LYMPHOCYTES NFR BLD: 19.5 %
MAGNESIUM SERPL-MCNC: 1.3 MG/DL
MCH RBC QN AUTO: 23.9 PG
MCHC RBC AUTO-ENTMCNC: 30.7 G/DL
MCV RBC AUTO: 78 FL
MONOCYTES # BLD AUTO: 1.2 K/UL
MONOCYTES NFR BLD: 20.4 %
NEUTROPHILS # BLD AUTO: 3.3 K/UL
NEUTROPHILS NFR BLD: 57.8 %
NRBC BLD-RTO: 0 /100 WBC
PHOSPHATE SERPL-MCNC: 2.4 MG/DL
PLATELET # BLD AUTO: 131 K/UL
PMV BLD AUTO: ABNORMAL FL
POTASSIUM SERPL-SCNC: 3.7 MMOL/L
PROT SERPL-MCNC: 7.5 G/DL
RBC # BLD AUTO: 5.65 M/UL
SODIUM SERPL-SCNC: 141 MMOL/L
TACROLIMUS BLD-MCNC: 15.1 NG/ML
WBC # BLD AUTO: 5.64 K/UL

## 2018-05-01 PROCEDURE — 84075 ASSAY ALKALINE PHOSPHATASE: CPT

## 2018-05-01 PROCEDURE — 80197 ASSAY OF TACROLIMUS: CPT

## 2018-05-01 PROCEDURE — 87799 DETECT AGENT NOS DNA QUANT: CPT

## 2018-05-01 PROCEDURE — 80069 RENAL FUNCTION PANEL: CPT

## 2018-05-01 PROCEDURE — 85025 COMPLETE CBC W/AUTO DIFF WBC: CPT

## 2018-05-01 PROCEDURE — 83735 ASSAY OF MAGNESIUM: CPT

## 2018-05-02 LAB
BKV DNA SERPL NAA+PROBE-ACNC: <125 COPIES/ML
BKV DNA SERPL NAA+PROBE-LOG#: <2.1 LOG (10) COPIES/ML
BKV DNA SERPL QL NAA+PROBE: NOT DETECTED

## 2018-05-03 ENCOUNTER — TELEPHONE (OUTPATIENT)
Dept: TRANSPLANT | Facility: CLINIC | Age: 63
End: 2018-05-03

## 2018-05-03 DIAGNOSIS — R80.9 PROTEINURIA, UNSPECIFIED TYPE: Primary | ICD-10-CM

## 2018-05-03 NOTE — TELEPHONE ENCOUNTER
----- Message from Kena Robles RN sent at 5/1/2018  4:40 PM CDT -----      ----- Message -----  From: Sylvie Frias MD  Sent: 5/1/2018   3:06 PM  To: [facility] Clinical    Prograf level higher than goal but previously stable - arrange for repeat level

## 2018-05-04 ENCOUNTER — TELEPHONE (OUTPATIENT)
Dept: TRANSPLANT | Facility: CLINIC | Age: 63
End: 2018-05-04

## 2018-05-04 NOTE — TELEPHONE ENCOUNTER
----- Message from Lashawn Buster sent at 5/4/2018 10:55 AM CDT -----  Contact: pt  Patient Returning Call    Who Called: pt  Who Left Message for Patient: n/a  Does the patient know what this is regarding?: n/a  Communication Preference (Chinedu response to Pt. (or) Call Back # and timeframe)  Additional Information:    Pt wishes for Kena to give him a call @ [phone]

## 2018-05-04 NOTE — TELEPHONE ENCOUNTER
Pt contacted and advised of MD's lab review and instructions.  Pt. repeated instructions and verbalized understanding.     Repeat labs scheduled for 5/7/18, Reiterated 12 hour trough process.

## 2018-05-07 ENCOUNTER — OFFICE VISIT (OUTPATIENT)
Dept: TRANSPLANT | Facility: CLINIC | Age: 63
End: 2018-05-07
Payer: COMMERCIAL

## 2018-05-07 ENCOUNTER — TELEPHONE (OUTPATIENT)
Dept: TRANSPLANT | Facility: CLINIC | Age: 63
End: 2018-05-07

## 2018-05-07 ENCOUNTER — LAB VISIT (OUTPATIENT)
Dept: LAB | Facility: HOSPITAL | Age: 63
End: 2018-05-07
Attending: INTERNAL MEDICINE
Payer: COMMERCIAL

## 2018-05-07 VITALS
OXYGEN SATURATION: 99 % | RESPIRATION RATE: 20 BRPM | HEART RATE: 72 BPM | HEIGHT: 73 IN | TEMPERATURE: 98 F | WEIGHT: 207 LBS | SYSTOLIC BLOOD PRESSURE: 164 MMHG | DIASTOLIC BLOOD PRESSURE: 94 MMHG | BODY MASS INDEX: 27.43 KG/M2

## 2018-05-07 DIAGNOSIS — Z79.4 TYPE 2 DIABETES MELLITUS WITH STAGE 2 CHRONIC KIDNEY DISEASE, WITH LONG-TERM CURRENT USE OF INSULIN: ICD-10-CM

## 2018-05-07 DIAGNOSIS — E11.3393 TYPE 2 DIABETES MELLITUS WITH BOTH EYES AFFECTED BY MODERATE NONPROLIFERATIVE RETINOPATHY WITHOUT MACULAR EDEMA, WITHOUT LONG-TERM CURRENT USE OF INSULIN: ICD-10-CM

## 2018-05-07 DIAGNOSIS — Z94.0 KIDNEY REPLACED BY TRANSPLANT: ICD-10-CM

## 2018-05-07 DIAGNOSIS — I10 ESSENTIAL HYPERTENSION: ICD-10-CM

## 2018-05-07 DIAGNOSIS — E11.42 DIABETIC POLYNEUROPATHY ASSOCIATED WITH TYPE 2 DIABETES MELLITUS: ICD-10-CM

## 2018-05-07 DIAGNOSIS — E11.22 TYPE 2 DIABETES MELLITUS WITH STAGE 2 CHRONIC KIDNEY DISEASE, WITH LONG-TERM CURRENT USE OF INSULIN: ICD-10-CM

## 2018-05-07 DIAGNOSIS — Z94.0 S/P KIDNEY TRANSPLANT: ICD-10-CM

## 2018-05-07 DIAGNOSIS — E78.5 HYPERLIPIDEMIA, UNSPECIFIED HYPERLIPIDEMIA TYPE: ICD-10-CM

## 2018-05-07 DIAGNOSIS — Z29.89 PROPHYLACTIC IMMUNOTHERAPY: ICD-10-CM

## 2018-05-07 DIAGNOSIS — N18.2 CKD (CHRONIC KIDNEY DISEASE) STAGE 2, GFR 60-89 ML/MIN: Primary | Chronic | ICD-10-CM

## 2018-05-07 DIAGNOSIS — Z86.19 HISTORY OF HEPATITIS C: ICD-10-CM

## 2018-05-07 DIAGNOSIS — Z79.60 LONG-TERM USE OF IMMUNOSUPPRESSANT MEDICATION: ICD-10-CM

## 2018-05-07 DIAGNOSIS — N18.2 TYPE 2 DIABETES MELLITUS WITH STAGE 2 CHRONIC KIDNEY DISEASE, WITH LONG-TERM CURRENT USE OF INSULIN: ICD-10-CM

## 2018-05-07 DIAGNOSIS — D63.8 ANEMIA OF CHRONIC DISEASE: ICD-10-CM

## 2018-05-07 DIAGNOSIS — R80.9 PROTEINURIA, UNSPECIFIED TYPE: ICD-10-CM

## 2018-05-07 DIAGNOSIS — R35.1 NOCTURIA MORE THAN TWICE PER NIGHT: ICD-10-CM

## 2018-05-07 LAB — TACROLIMUS BLD-MCNC: 19.6 NG/ML

## 2018-05-07 PROCEDURE — 99999 PR PBB SHADOW E&M-EST. PATIENT-LVL III: CPT | Mod: PBBFAC,,, | Performed by: INTERNAL MEDICINE

## 2018-05-07 PROCEDURE — 99215 OFFICE O/P EST HI 40 MIN: CPT | Mod: S$PBB,,, | Performed by: INTERNAL MEDICINE

## 2018-05-07 PROCEDURE — 36415 COLL VENOUS BLD VENIPUNCTURE: CPT

## 2018-05-07 PROCEDURE — 80197 ASSAY OF TACROLIMUS: CPT

## 2018-05-07 PROCEDURE — 99213 OFFICE O/P EST LOW 20 MIN: CPT | Mod: PBBFAC | Performed by: INTERNAL MEDICINE

## 2018-05-07 RX ORDER — TAMSULOSIN HYDROCHLORIDE 0.4 MG/1
CAPSULE ORAL
Qty: 30 CAPSULE | Refills: 1 | OUTPATIENT
Start: 2018-05-07

## 2018-05-07 RX ORDER — INSULIN LISPRO 100 [IU]/ML
INJECTION, SOLUTION INTRAVENOUS; SUBCUTANEOUS
Qty: 30 ML | Refills: 4 | Status: SHIPPED | OUTPATIENT
Start: 2018-05-07 | End: 2019-01-30

## 2018-05-07 NOTE — LETTER
May 7, 2018        Olayinka Ewing  [address]  [facility] [address]  Phone: [phone]  Fax: [phone]             [facility]  [address]  [facility] [address]  Phone: [phone]   Patient: Ravi Zamorano   MR Number: [id number]   YOB: 1955   Date of Visit: 5/7/2018       Dear Dr. Olayinka Ewing    Thank you for referring Ravi Zamorano to me for evaluation. Attached you will find relevant portions of my assessment and plan of care.    If you have questions, please do not hesitate to call me. I look forward to following Ravi Zamorano along with you.    Sincerely,    Sylvie Frias MD    Enclosure    If you would like to receive this communication electronically, please contact [email or web address] or [phone] to request [facility] access.    [facility] is a tool which provides read-only access to select patient information with whom you have a relationship. Its easy to use and provides real time access to review your patients record including encounter summaries, notes, results, and demographic information.    If you feel you have received this communication in error or would no longer like to receive these types of communications, please e-mail [email or web address]

## 2018-05-07 NOTE — PROGRESS NOTES
Prograf level markedly elevated --> hold Prograf tonight and all day tomorrow - he should have STAT labs at [facility] on Wednesday to determine if and at what dose Prograf can be restarted

## 2018-05-07 NOTE — PROGRESS NOTES
Kidney Post-Transplant Assessment    Referring Physician: Alexi Glasgow  Current Nephrologist: Alexi Glasgow    ORGAN: RIGHT KIDNEY  Donor Type:  - brain death  PHS Increased Risk: yes  Cold Ischemia: 314 mins  Induction Medications: thymoglobulin    Subjective:     CC:  Reassessment of renal allograft function and management of chronic immunosuppression.    Kidney History:  Mr. Zamorano is a 62 y.o. year old Black or  male with history of ESRD presumed secondary to DM/HTN who was on RRT since 5/6/15 until receiving PHS increased risk DDRT (THYMO induction given HCV positive donor and recipient, KDPI 85%, WIT 27 minutes, CIT 5 hours and 14 minutes, donor RPR positive thus recipient received PCN x3 doses; CMV D+/R+) on 16. He started Harvoni on 17 and has SVR; HCV PCR negative since 17. He has CKD stage 2 - GFR 60-89 and his baseline creatinine is between 1.0 to 1.4. He takes mycophenolate mofetil, prednisone and tacrolimus for maintenance immunosuppression.     Interval History: Patient last seen in transplant nephrology clinic on 17. Since last visit he denies any hospitalizations or ER visits. He isn't really walking around like he used to - states now he is mostly just cutting the yard. He states his home BPs are in the 120s-140s/70-80s. He has been taking nifedipine 30 mg mostly in the evening when his SBP is >140 - states he will take it 1-2 times a week. He gained 8 lbs since last visit.     Review of Systems  Constitutional: Negative for fever, appetite change and fatigue.    HENT: +intermittent vertigo - last episode last weekend; Negative for hearing loss, sore throat and mouth sores.   Eyes: +right eye floater; Negative for photophobia, pain and visual disturbance.   Respiratory: Negative for cough, chest tightness, shortness of breath and wheezing.   Cardiovascular: Negative for chest pain, palpitations and leg swelling.   Gastrointestinal:  Negative for nausea, vomiting, abdominal pain, diarrhea, constipation, blood in stool and abdominal distention.   Genitourinary: +intermittent foamy urine for the last month; Negative for dysuria, urgency, frequency, hematuria, decreased urine volume, difficulty urinating  Musculoskeletal: Negative for back pain, joint swelling, arthralgias and gait problem.   Skin: Negative for pallor, rash and wound.   Neurological: +mild tremors; Negative for syncope, weakness, light-headedness and headaches.   Hematological: Negative for adenopathy. Does not bruise/bleed easily.   Psychiatric/Behavioral: Negative for confusion, sleep disturbance and dysphoric mood. The patient is not nervous/anxious.     Medications:   Current Outpatient Prescriptions   Medication Sig Dispense Refill    aspirin 81 MG Chew Take 81 mg by mouth once daily.      atorvastatin (LIPITOR) 10 MG tablet Take 10 mg by mouth once daily.      blood sugar diagnostic Strp 1 strip by Misc.(Non-Drug; Combo Route) route 4 (four) times daily before meals and nightly. e11.65 100 strip 5    carvedilol (COREG) 12.5 MG tablet TAKE 1 TABLET BY MOUTH TWICE DAILY 60 tablet 6    cholecalciferol, vitamin D3, 1,000 unit capsule Take 1 capsule (1,000 Units total) by mouth once daily.  0    famotidine (PEPCID) 20 MG tablet Take 1 tablet (20 mg total) by mouth every evening. 90 tablet 3    gabapentin (NEURONTIN) 300 MG capsule TAKE ONE CAPSULE BY MOUTH THREE TIMES A DAY 90 capsule 4    insulin detemir U-100 (LEVEMIR FLEXTOUCH) 100 unit/mL (3 mL) SubQ InPn pen Inject 35 units at night. 1 Box 4    insulin lispro (HUMALOG KWIKPEN) 100 unit/mL InPn pen Inject 18 units w/ breakfast, 16 units w/ lunch and dinner plus scale 150-200 +2, 201-250 +4, 251-300 +6, 301-350 +8. 2 Box 11    lancets Misc 1 each by Misc.(Non-Drug; Combo Route) route 4 (four) times daily before meals and nightly. e11.65 100 each 0    MAGNESIUM OXIDE ORAL Take by mouth once daily.       melatonin 5  "mg Tab Take 1 tablet (5 mg total) by mouth every evening.      multivitamin (THERAGRAN) tablet Take 1 tablet by mouth once daily.      mycophenolate (CELLCEPT) 250 mg Cap Take 4 capsules (1,000 mg total) by mouth 2 (two) times daily. 240 capsule 11    nifedipine 30 MG ORAL TR24 (PROCARDIA-XL) 30 MG (OSM) 24 hr tablet Take 1 tablet (30 mg total) by mouth once daily. 30 tablet 11    ONETOUCH ULTRA TEST Strp Inject 1 each into the skin 4 (four) times daily. 200 each 6    pen needle, diabetic 32 gauge x 5/32" Ndle USE TO ADMINISTER INSULIN 4 TIMES DAILY. 100 each 11    predniSONE (DELTASONE) 5 MG tablet Take 1 tablet (5 mg total) by mouth once daily. 30 tablet 11    tacrolimus (PROGRAF) 1 MG Cap Take 2 capsules (2 mg total) by mouth every 12 (twelve) hours. 120 capsule 11    tamsulosin (FLOMAX) 0.4 mg Cp24 TAKE 1 CAPSULE BY MOUTH AFTER DINNER. 30 capsule 1    TRULICITY 0.75 mg/0.5 mL PnIj INJECT 0.5 MLS (0.75 MG TOTAL) INTO THE SKIN EVERY 7 DAYS. 2 mL 6    blood-glucose meter (FREESTYLE SYSTEM KIT) kit Use as instructed e11.65 may substitute meter as covered by insurance 1 each 0    meclizine (ANTIVERT) 25 mg tablet Take 1 tablet (25 mg total) by mouth 3 (three) times daily as needed for Dizziness. 21 tablet 0     No current facility-administered medications for this visit.          Objective:     Blood pressure (!) 164/94, pulse 72, temperature 98.2 °F (36.8 °C), temperature source Oral, resp. rate 20, height 6' 1" (1.854 m), weight 93.9 kg (207 lb 0.2 oz), SpO2 99 %.body mass index is 27.31 kg/m².    Physical Exam  General: No acute distress, well groomed, alert and oriented x 3  HEENT: Normocephalic, atraumatic, EOM's intact bilaterally, external inspection of ears and nose normal, moist mucous membranes, no oral ulcerations/lesions  Neck: Supple, symmetrical, trachea midline, no thyromegaly, no JVD  Respiratory: Clear to auscultation bilaterally, respirations unlabored, no " rales/rhonchi/wheezing  Cardiovacular: Regular rate and rhythm, S1, S2 normal, no murmurs, rubs or gallops  Gastrointestinal: Soft, non-tender, bowel sounds normal, no hepatosplenomegaly  Renal allograft exam: No tenderness, no bruits, normal exam  Musculoskeletal: No knee or ankle joint tenderness or swelling.   Extremities: No clubbing or cyanosis of bilateral upper extremities; no lower extremity edema bilaterally, radial pulses 2+ bilaterally, symmetric  Skin: warm and dry; no rash on exposed skin  Neurologic: CN grossly intact    Labs:  Lab Results   Component Value Date    WBC 5.64 05/01/2018    HGB 13.5 (L) 05/01/2018    HCT 44.0 05/01/2018     05/01/2018    K 3.7 05/01/2018     05/01/2018    CO2 27 05/01/2018    BUN 14 05/01/2018    CREATININE 1.0 05/01/2018    EGFRNONAA >60.0 05/01/2018    CALCIUM 9.4 05/01/2018    PHOS 2.4 (L) 05/01/2018    MG 1.3 (L) 05/01/2018    ALBUMIN 3.8 05/01/2018    ALBUMIN 3.8 05/01/2018    AST 18 05/01/2018    ALT 9 (L) 05/01/2018    UTPCR 1.18 (H) 05/01/2018    .0 (H) 02/05/2017    TACROLIMUS 19.6 (H) 05/07/2018       No results found for: EXTANC, EXTWBC, EXTSEGS, EXTPLATELETS, EXTHEMOGLOBI, EXTHEMATOCRI, EXTCREATININ, EXTSODIUM, EXTPOTASSIUM, EXTBUN, EXTCO2, EXTCALCIUM, EXTPHOSPHORU, EXTGLUCOSE, EXTALBUMIN, EXTAST, EXTALT, EXTBILITOTAL, EXTLIPASE, EXTAMYLASE    No results found for: EXTCYCLOSLVL, EXTSIROLIMUS, EXTTACROLVL, EXTPROTCRE, EXTPTHINTACT, EXTPROTEINUA, EXTWBCUA, EXTRBCUA    Labs were reviewed with the patient.    Assessment/Plan:     1. CKD (chronic kidney disease) stage 2, GFR 60-89 ml/min    2. History of hepatitis C, s/p successful Rx w/ SVR24 - 9/2017    3. S/P cadaveric kidney transplant 11/5/2016. ESRD secondary to HTN/DMII    4. Diabetic polyneuropathy associated with type 2 diabetes mellitus    5. Anemia of chronic disease    6. Prophylactic immunotherapy    7. Type 2 diabetes mellitus with stage 2 chronic kidney disease, with long-term  current use of insulin    8. Long-term use of immunosuppressant medication    9. Essential hypertension    10. Type 2 diabetes mellitus with both eyes affected by moderate nonproliferative retinopathy without macular edema, without long-term current use of insulin    11. Hyperlipidemia, unspecified hyperlipidemia type        Mr. Zamorano is a 62 y.o. male with:     # History of ESRD presumed secondary to DM/HTN who was on RRT since 5/6/15 until receiving PHS increased risk DDRT (THYMO induction given HCV positive donor and recipient, KDPI 85%, WIT 27 minutes, CIT 5 hours and 14 minutes, donor RPR positive thus recipient received PCN x3 doses; CMV D+/R+) on 11/5/16: baseline Cr 1.0 to 1.4  - last Cr within baseline at 1.0 from 5/1/18  - last UPC elevated at 1.18 from 5/1/18 --> obtain 24 hour urine collection for further quantification of proteinuria - depending on result be may need renal allograft biopsy  - he is on ASA thus if he needs biopsy will need to have him hold ASA    # Immunosuppression:   - Prograf level markedly elevated at 19.6 from 5/7/18 thus will hold Prograf this evening and all day Tuesday - will need to obtain STAT labs on Wednesday (5/9/18) to determine if and at what dose Prograf can be restarted  - continue MMF 1000 mg BID - will need to clarify if he is indeed taking 1000 mg BID at home or just 500 mg BID - he wasn't sure at time of clinic visit  - continue prednisone 5 mg daily  - continue to monitor for toxicities from immunosuppressive medications     # Infectious Surveillance:   - last BK serum PCR negative from 5/1/18  - last CMV PCR negative from 3/8/17    # HTN: BPs stable at home per patient; elevated in clinic  - continue current anti-hypertensive regimen  - defer adjustments to BP meds to his general nephrologist  - continue with home blood pressure monitoring  - low salt and healthy life discussed with the patient     # Metabolic Bone Disease/Secondary Hyperparathyroidism: last  calcium normal but phos low   - counseled patient on higher phos diet   - he preferred to do dietary adjustments and not start KPN at this time  - will monitor PTH, calcium, and phosphorus as per our center protocol    # Hypomagnesemia: Mg level on lower end at 1.3  - continue MagOx at current dose - if Mg remains on lower end would uptitrate MagOx dose     # DM: last HbA1c 6.8% from 11/27/17  - counseled patient on importance of optimizing glycemic control   - continue following up with endocrinology     # Anemia of chronic disease: Hb stable at 13.5  - will continue monitoring as per our center guidelines. No indication for acute intervention today     # Hepatitis C: genotype 1a; started Harvoni on 1/12/17; HCV PCR negative since 2/8/17; last HCV PCR negative from 9/29/17     # Vitamin D Deficiency: vitamin D level improved from 15 (11/8/16) --> 39 (2/5/17) thus discontinued ergocalciferol and started cholecalciferol 1000 units daily   - continue cholecalciferol 1000 units daily     Follow-up:   Clinic: return to transplant clinic weekly for the first month after transplant; every 2 weeks during months 2-3; then at 6-, 9-, 12-, 18-, 24-, and 36- months post-transplant to reassess for complications from immunosuppression toxicity and monitor for rejection.  Annually thereafter.    Labs: since patient remains at high risk for rejection and drug-related complications that warrant close monitoring, labs will be ordered as follows: continue twice weekly CBC, renal panel, and drug level for first month; then same labs once weekly through 3rd month post-transplant.  Urine for UA and protein/creatinine ratio monthly.  Serum BK - PCR at 1-, 3-, 6-, 9-, 12-, 18-, 24-, and 36- months post-transplant.  Hepatic panel at 1-, 2-, 3-, 6-, 9-, 12-, 18-, 24-, and 36- months post-transplant.    Sylvie Frias MD       Education:   Material provided to the patient.  Patient reminded to call with any health changes since these can be  early signs of significant complications.  Also, I advised the patient to be sure any new medications or changes of old medications are discussed with either a pharmacist or physician knowledgeable with transplant to avoid rejection/drug toxicity related to significant drug interactions.    UNOS Patient Status  Functional Status: 100% - Normal, no complaints, no evidence of disease  Physical Capacity: No Limitations

## 2018-05-07 NOTE — PATIENT INSTRUCTIONS
1. Hold Prograf for now   2. Get labs done on Wednesday morning here at main campus so we can get results the same day and tell you if and at what dose to restart the Prograf   3. Call your coordinator with how much Cellcept you are taking   4. Try to exercise more   5. Collect the 24 hour urine specimen   6. Make sure to follow-up with your general nephrologist   7. Eat a higher potassium and phosphorus diet

## 2018-05-09 ENCOUNTER — LAB VISIT (OUTPATIENT)
Dept: LAB | Facility: HOSPITAL | Age: 63
End: 2018-05-09
Payer: COMMERCIAL

## 2018-05-09 ENCOUNTER — TELEPHONE (OUTPATIENT)
Dept: INTERNAL MEDICINE | Facility: CLINIC | Age: 63
End: 2018-05-09

## 2018-05-09 DIAGNOSIS — Z94.0 KIDNEY REPLACED BY TRANSPLANT: ICD-10-CM

## 2018-05-09 LAB — TACROLIMUS BLD-MCNC: 10.1 NG/ML

## 2018-05-09 PROCEDURE — 80197 ASSAY OF TACROLIMUS: CPT

## 2018-05-09 PROCEDURE — 36415 COLL VENOUS BLD VENIPUNCTURE: CPT

## 2018-05-09 RX ORDER — PEN NEEDLE, DIABETIC 30 GX3/16"
NEEDLE, DISPOSABLE MISCELLANEOUS
Qty: 400 EACH | Refills: 3 | Status: SHIPPED | OUTPATIENT
Start: 2018-05-09 | End: 2019-05-24

## 2018-05-09 RX ORDER — TAMSULOSIN HYDROCHLORIDE 0.4 MG/1
CAPSULE ORAL
Qty: 90 CAPSULE | Refills: 3 | Status: SHIPPED | OUTPATIENT
Start: 2018-05-09 | End: 2019-07-26

## 2018-05-09 RX ORDER — LANOLIN ALCOHOL/MO/W.PET/CERES
400 CREAM (GRAM) TOPICAL DAILY
Refills: 0 | COMMUNITY
Start: 2018-05-09 | End: 2018-11-12 | Stop reason: ALTCHOICE

## 2018-05-09 RX ORDER — TACROLIMUS 1 MG/1
1 CAPSULE ORAL EVERY 12 HOURS
Qty: 60 CAPSULE | Refills: 11 | Status: SHIPPED | OUTPATIENT
Start: 2018-05-09 | End: 2018-05-14

## 2018-05-09 NOTE — TELEPHONE ENCOUNTER
----- Message from Sylvie Frias MD sent at 5/9/2018 11:21 AM CDT -----  Prograf level improved but still on higher end --> would have him restart Prograf at lower dose of 1 mg BID starting tomorrow morning. He was previously on 2 mg BID. Repeat labs on Monday (5/14/18)

## 2018-05-09 NOTE — TELEPHONE ENCOUNTER
"----- Message from Rosanna Jones sent at 5/9/2018  8:03 AM CDT -----  Contact: Self  Pt needs refill for- pen needle, diabetic 32 gauge x 5/32" Marcus, send to:      .  [facility] [address] [place] [address]  Phone: [phone] Fax: [phone]        "

## 2018-05-09 NOTE — TELEPHONE ENCOUNTER
----- Message from Jitendra Bowen sent at 5/9/2018 11:56 AM CDT -----  Contact: Patient [phone]  Patient requesting to speak with you to prescribe him Rx regarding his Anxiety and keep it maintained, requesting a call.    Please call and advise  Thank you

## 2018-05-09 NOTE — TELEPHONE ENCOUNTER
Pt contacted and advised of MD's lab review and instructions.  Pt. repeated instructions and verbalized understanding.     Repeat labs on Monday, patient agreed.     We also clarified medication doses from clinic appt.  Patient verified cellcept 1000 mg twice daily, Mag- oxide 400 mg daily.  He also is inquiring regarding anxiety medication, which was discussed during clinic visit.  This writer encouraged patient to contact his pcp for follow up and to inquire about that.

## 2018-05-09 NOTE — TELEPHONE ENCOUNTER
Spoke with pt. Pt states that his anxiety has worsened over the past couple of weeks and that he would like medication/Rx for anxiety, if possible. Please adv.

## 2018-05-09 NOTE — PROGRESS NOTES
Prograf level improved but still on higher end --> would have him restart Prograf at lower dose of 1 mg BID starting tomorrow morning. He was previously on 2 mg BID. Repeat labs on Monday (5/14/18)

## 2018-05-09 NOTE — TELEPHONE ENCOUNTER
Spoke with pt. Pt is scheduled for appointment on 05/14 at 11:20. Pt informed that he would need to be evaluated first before given any medication.

## 2018-05-14 ENCOUNTER — LAB VISIT (OUTPATIENT)
Dept: LAB | Facility: HOSPITAL | Age: 63
End: 2018-05-14
Attending: INTERNAL MEDICINE
Payer: COMMERCIAL

## 2018-05-14 ENCOUNTER — OFFICE VISIT (OUTPATIENT)
Dept: INTERNAL MEDICINE | Facility: CLINIC | Age: 63
End: 2018-05-14
Payer: COMMERCIAL

## 2018-05-14 VITALS
OXYGEN SATURATION: 98 % | WEIGHT: 209 LBS | HEIGHT: 73 IN | HEART RATE: 66 BPM | BODY MASS INDEX: 27.7 KG/M2 | DIASTOLIC BLOOD PRESSURE: 66 MMHG | SYSTOLIC BLOOD PRESSURE: 128 MMHG

## 2018-05-14 DIAGNOSIS — Z94.0 KIDNEY REPLACED BY TRANSPLANT: ICD-10-CM

## 2018-05-14 DIAGNOSIS — N18.2 TYPE 2 DIABETES MELLITUS WITH STAGE 2 CHRONIC KIDNEY DISEASE, WITH LONG-TERM CURRENT USE OF INSULIN: Primary | ICD-10-CM

## 2018-05-14 DIAGNOSIS — Z12.11 SCREEN FOR COLON CANCER: ICD-10-CM

## 2018-05-14 DIAGNOSIS — Z79.4 TYPE 2 DIABETES MELLITUS WITH STAGE 2 CHRONIC KIDNEY DISEASE, WITH LONG-TERM CURRENT USE OF INSULIN: Primary | ICD-10-CM

## 2018-05-14 DIAGNOSIS — E11.22 TYPE 2 DIABETES MELLITUS WITH STAGE 2 CHRONIC KIDNEY DISEASE, WITH LONG-TERM CURRENT USE OF INSULIN: Primary | ICD-10-CM

## 2018-05-14 DIAGNOSIS — Z94.0 KIDNEY REPLACED BY TRANSPLANT: Primary | ICD-10-CM

## 2018-05-14 LAB — TACROLIMUS BLD-MCNC: 12.7 NG/ML

## 2018-05-14 PROCEDURE — 99213 OFFICE O/P EST LOW 20 MIN: CPT | Mod: PBBFAC | Performed by: INTERNAL MEDICINE

## 2018-05-14 PROCEDURE — 99999 PR PBB SHADOW E&M-EST. PATIENT-LVL III: CPT | Mod: PBBFAC,,, | Performed by: INTERNAL MEDICINE

## 2018-05-14 PROCEDURE — 36415 COLL VENOUS BLD VENIPUNCTURE: CPT

## 2018-05-14 PROCEDURE — 99214 OFFICE O/P EST MOD 30 MIN: CPT | Mod: S$GLB,,, | Performed by: INTERNAL MEDICINE

## 2018-05-14 PROCEDURE — 80197 ASSAY OF TACROLIMUS: CPT

## 2018-05-14 RX ORDER — TACROLIMUS 1 MG/1
1 CAPSULE ORAL DAILY
Qty: 30 CAPSULE | Refills: 11 | Status: CANCELLED | OUTPATIENT
Start: 2018-05-14

## 2018-05-14 RX ORDER — TACROLIMUS 0.5 MG/1
0.5 CAPSULE ORAL EVERY 12 HOURS
Qty: 60 CAPSULE | Refills: 11 | Status: SHIPPED | OUTPATIENT
Start: 2018-05-14 | End: 2018-11-06 | Stop reason: DRUGHIGH

## 2018-05-14 NOTE — PROGRESS NOTES
Prograf still elevated --> if true 12 hour trough decrease Prograf from 1 mg BID to 0.5 mg BID. Will need to send a new script with 0.5 mg pills. Repeat labs in 7-10 days after he gets new pills

## 2018-05-14 NOTE — PROGRESS NOTES
"Subjective:       Patient ID: Ravi Zamorano is a 62 y.o. male.    Chief Complaint: Anxiety (pt complains of chronic anxiety and b/p tends to elevate at times. )    HPI   63 yo M with controlled DM presents for evaluation of anxiety and BP fluctuations.   On insulin. Hx kidney transplant.     Blood sugars have been doing well. He ran out of trulicity in February but is back on it now.     Trouble with other drivers - people getting in front of him, yelling. Thinks it increases his blood pressure. It is only with driving.     Off cigarettes x 1.5 years.  Review of Systems   Constitutional: Negative for fever.   HENT: Negative.    Eyes: Negative.    Respiratory: Negative for shortness of breath.    Cardiovascular: Negative for chest pain and leg swelling.   Gastrointestinal: Negative for abdominal pain, diarrhea, nausea and vomiting.   Genitourinary: Negative.    Musculoskeletal: Negative for arthralgias.   Skin: Negative for rash.   Psychiatric/Behavioral: Negative.        Objective:   /66   Pulse 66   Ht 6' 1" (1.854 m)   Wt 94.8 kg (209 lb)   SpO2 98%   BMI 27.57 kg/m²      Physical Exam   Constitutional: He is oriented to person, place, and time. He appears well-developed and well-nourished. No distress.   HENT:   Head: Normocephalic and atraumatic.   Cardiovascular: Normal rate and regular rhythm.    No murmur heard.  Pulmonary/Chest: Effort normal. No respiratory distress. He has no wheezes. He has no rales.   Neurological: He is alert and oriented to person, place, and time.   Skin: Skin is warm and dry. He is not diaphoretic.   Psychiatric: He has a normal mood and affect. His behavior is normal.       Protective Sensation (w/ 10 gram monofilament):  Right: Intact  Left: Intact    Visual Inspection:  Normal -  Bilateral    Pedal Pulses:   Right: Present  Left: Present    Posterior tibialis:   Right:Present  Left: Present    Assessment:       1. Type 2 diabetes mellitus with stage 2 chronic kidney " disease, with long-term current use of insulin    2. Screen for colon cancer        Plan:       Ravi was seen today for anxiety.    Diagnoses and all orders for this visit:    Type 2 diabetes mellitus with stage 2 chronic kidney disease, with long-term current use of insulin  -     CBC auto differential; Future  -     Comprehensive metabolic panel; Future  -     Hemoglobin A1c; Future  -     TSH; Future    Screen for colon cancer   Schedule cscope

## 2018-05-14 NOTE — TELEPHONE ENCOUNTER
Pt's wife validated this is a 12 hr level. Ordered 0.5mg caps. Pt will call when he receives them and plan repeat FK level then.

## 2018-05-14 NOTE — TELEPHONE ENCOUNTER
Corrected the dose with Pt wife, 0.5mg q 12 hrs, she verbalzied understanding and will call when new dose is received.

## 2018-05-14 NOTE — TELEPHONE ENCOUNTER
----- Message from Sylvie Frias MD sent at 5/14/2018 10:35 AM CDT -----  Prograf still elevated --> if true 12 hour trough decrease Prograf from 1 mg BID to 0.5 mg BID. Will need to send a new script with 0.5 mg pills. Repeat labs in 7-10 days after he gets new pills

## 2018-05-18 ENCOUNTER — TELEPHONE (OUTPATIENT)
Dept: INTERNAL MEDICINE | Facility: CLINIC | Age: 63
End: 2018-05-18

## 2018-05-18 DIAGNOSIS — N18.2 TYPE 2 DIABETES MELLITUS WITH STAGE 2 CHRONIC KIDNEY DISEASE, WITH LONG-TERM CURRENT USE OF INSULIN: Primary | ICD-10-CM

## 2018-05-18 DIAGNOSIS — E11.22 TYPE 2 DIABETES MELLITUS WITH STAGE 2 CHRONIC KIDNEY DISEASE, WITH LONG-TERM CURRENT USE OF INSULIN: Primary | ICD-10-CM

## 2018-05-18 DIAGNOSIS — Z79.4 TYPE 2 DIABETES MELLITUS WITH STAGE 2 CHRONIC KIDNEY DISEASE, WITH LONG-TERM CURRENT USE OF INSULIN: Primary | ICD-10-CM

## 2018-05-21 NOTE — TELEPHONE ENCOUNTER
Spoke with pt and reported lab results. Pt verbalized understanding. Pt has 3 mo labs scheduled 8/21/18.    Please assist pt with 3 mo f/u with PCP.    Thanks!

## 2018-05-22 RX ORDER — GABAPENTIN 300 MG/1
CAPSULE ORAL
Qty: 90 CAPSULE | Refills: 4 | Status: SHIPPED | OUTPATIENT
Start: 2018-05-22 | End: 2018-11-20

## 2018-05-31 ENCOUNTER — TELEPHONE (OUTPATIENT)
Dept: TRANSPLANT | Facility: CLINIC | Age: 63
End: 2018-05-31

## 2018-05-31 DIAGNOSIS — E11.42 TYPE 2 DIABETES MELLITUS WITH POLYNEUROPATHY: Primary | ICD-10-CM

## 2018-05-31 NOTE — TELEPHONE ENCOUNTER
Patient repeated back and voice a understanding of orders and will submit urine with 6/5/18 labs .

## 2018-05-31 NOTE — TELEPHONE ENCOUNTER
----- Message from Sylvie Frias MD sent at 5/30/2018  1:17 PM CDT -----  24 hour urine collection with only 637 mg of protein. Would check monthly UPCs for the next 6 months

## 2018-06-01 RX ORDER — CARVEDILOL 12.5 MG/1
TABLET ORAL
Qty: 60 TABLET | Refills: 6 | Status: SHIPPED | OUTPATIENT
Start: 2018-06-01 | End: 2018-12-27

## 2018-06-05 ENCOUNTER — LAB VISIT (OUTPATIENT)
Dept: LAB | Facility: HOSPITAL | Age: 63
End: 2018-06-05
Attending: INTERNAL MEDICINE
Payer: MEDICARE

## 2018-06-05 DIAGNOSIS — Z94.0 KIDNEY REPLACED BY TRANSPLANT: ICD-10-CM

## 2018-06-05 LAB
ALBUMIN SERPL BCP-MCNC: 3.7 G/DL
ANION GAP SERPL CALC-SCNC: 8 MMOL/L
BASOPHILS # BLD AUTO: 0.04 K/UL
BASOPHILS NFR BLD: 0.7 %
BUN SERPL-MCNC: 14 MG/DL
CALCIUM SERPL-MCNC: 9.8 MG/DL
CHLORIDE SERPL-SCNC: 107 MMOL/L
CO2 SERPL-SCNC: 29 MMOL/L
CREAT SERPL-MCNC: 1.1 MG/DL
DIFFERENTIAL METHOD: ABNORMAL
EOSINOPHIL # BLD AUTO: 0.1 K/UL
EOSINOPHIL NFR BLD: 1.6 %
ERYTHROCYTE [DISTWIDTH] IN BLOOD BY AUTOMATED COUNT: 15.9 %
EST. GFR  (AFRICAN AMERICAN): >60 ML/MIN/1.73 M^2
EST. GFR  (NON AFRICAN AMERICAN): >60 ML/MIN/1.73 M^2
GLUCOSE SERPL-MCNC: 159 MG/DL
HCT VFR BLD AUTO: 42.6 %
HGB BLD-MCNC: 12.9 G/DL
IMM GRANULOCYTES # BLD AUTO: 0.01 K/UL
IMM GRANULOCYTES NFR BLD AUTO: 0.2 %
LYMPHOCYTES # BLD AUTO: 0.9 K/UL
LYMPHOCYTES NFR BLD: 16.4 %
MAGNESIUM SERPL-MCNC: 1.9 MG/DL
MCH RBC QN AUTO: 23.8 PG
MCHC RBC AUTO-ENTMCNC: 30.3 G/DL
MCV RBC AUTO: 79 FL
MONOCYTES # BLD AUTO: 1.1 K/UL
MONOCYTES NFR BLD: 20.1 %
NEUTROPHILS # BLD AUTO: 3.4 K/UL
NEUTROPHILS NFR BLD: 61 %
NRBC BLD-RTO: 0 /100 WBC
PHOSPHATE SERPL-MCNC: 2.7 MG/DL
PLATELET # BLD AUTO: 122 K/UL
PMV BLD AUTO: ABNORMAL FL
POTASSIUM SERPL-SCNC: 4.8 MMOL/L
RBC # BLD AUTO: 5.41 M/UL
SODIUM SERPL-SCNC: 144 MMOL/L
TACROLIMUS BLD-MCNC: 4.7 NG/ML
WBC # BLD AUTO: 5.56 K/UL

## 2018-06-05 PROCEDURE — 80069 RENAL FUNCTION PANEL: CPT

## 2018-06-05 PROCEDURE — 80197 ASSAY OF TACROLIMUS: CPT

## 2018-06-05 PROCEDURE — 85025 COMPLETE CBC W/AUTO DIFF WBC: CPT

## 2018-06-05 PROCEDURE — 36415 COLL VENOUS BLD VENIPUNCTURE: CPT

## 2018-06-05 PROCEDURE — 83735 ASSAY OF MAGNESIUM: CPT

## 2018-06-18 RX ORDER — ATORVASTATIN CALCIUM 10 MG/1
TABLET, FILM COATED ORAL
Qty: 30 TABLET | Refills: 0 | OUTPATIENT
Start: 2018-06-18

## 2018-06-21 ENCOUNTER — TELEPHONE (OUTPATIENT)
Dept: ENDOCRINOLOGY | Facility: CLINIC | Age: 63
End: 2018-06-21

## 2018-06-21 NOTE — TELEPHONE ENCOUNTER
----- Message from Aster Kolb sent at 6/21/2018  2:08 PM CDT -----  Contact: Self [phone]  .Refill request.  dulaglutide 0.75 mg/0.5 mL PnPriscilla     ..    [facility] [address] [place] [address]  Phone: [phone] Fax: [phone]

## 2018-07-11 ENCOUNTER — LAB VISIT (OUTPATIENT)
Dept: LAB | Facility: HOSPITAL | Age: 63
End: 2018-07-11
Attending: INTERNAL MEDICINE
Payer: COMMERCIAL

## 2018-07-11 DIAGNOSIS — Z94.0 KIDNEY REPLACED BY TRANSPLANT: ICD-10-CM

## 2018-07-11 LAB
CREAT UR-MCNC: 62 MG/DL
PROT UR-MCNC: 25 MG/DL
PROT/CREAT RATIO, UR: 0.4

## 2018-07-11 PROCEDURE — 82570 ASSAY OF URINE CREATININE: CPT

## 2018-07-17 ENCOUNTER — TELEPHONE (OUTPATIENT)
Dept: ENDOSCOPY | Facility: HOSPITAL | Age: 63
End: 2018-07-17

## 2018-07-30 RX ORDER — NIFEDIPINE 30 MG/1
30 TABLET, EXTENDED RELEASE ORAL DAILY
Qty: 30 TABLET | Refills: 11 | Status: ON HOLD | OUTPATIENT
Start: 2018-07-30 | End: 2019-08-25

## 2018-07-30 NOTE — TELEPHONE ENCOUNTER
----- Message from Fransisca Levon sent at 7/30/2018 10:54 AM CDT -----  Contact: [phone]  Type: Rx    Name of medication(s):  nifedipine 30 MG ORAL TR24 (PROCARDIA-XL) 30 MG (OSM) 24 hr table    Is this a refill? New rx? refill    Who prescribed medication?    Pharmacy Name, Phone, & Location: [facility]    Comments: Patient states this will be a new script written by Dr. Mack.  Please advise, thanks

## 2018-08-09 ENCOUNTER — LAB VISIT (OUTPATIENT)
Dept: LAB | Facility: HOSPITAL | Age: 63
End: 2018-08-09
Attending: INTERNAL MEDICINE
Payer: COMMERCIAL

## 2018-08-09 DIAGNOSIS — Z94.0 KIDNEY REPLACED BY TRANSPLANT: ICD-10-CM

## 2018-08-09 LAB
CREAT UR-MCNC: 63 MG/DL
PROT UR-MCNC: 28 MG/DL
PROT/CREAT UR: 0.44 MG/G{CREAT}

## 2018-08-09 PROCEDURE — 84156 ASSAY OF PROTEIN URINE: CPT

## 2018-08-21 ENCOUNTER — TELEPHONE (OUTPATIENT)
Dept: INTERNAL MEDICINE | Facility: CLINIC | Age: 63
End: 2018-08-21

## 2018-08-21 ENCOUNTER — LAB VISIT (OUTPATIENT)
Dept: LAB | Facility: HOSPITAL | Age: 63
End: 2018-08-21
Attending: INTERNAL MEDICINE
Payer: COMMERCIAL

## 2018-08-21 DIAGNOSIS — Z79.4 TYPE 2 DIABETES MELLITUS WITH STAGE 2 CHRONIC KIDNEY DISEASE, WITH LONG-TERM CURRENT USE OF INSULIN: ICD-10-CM

## 2018-08-21 DIAGNOSIS — N18.2 TYPE 2 DIABETES MELLITUS WITH STAGE 2 CHRONIC KIDNEY DISEASE, WITH LONG-TERM CURRENT USE OF INSULIN: ICD-10-CM

## 2018-08-21 DIAGNOSIS — E11.22 TYPE 2 DIABETES MELLITUS WITH STAGE 2 CHRONIC KIDNEY DISEASE, WITH LONG-TERM CURRENT USE OF INSULIN: ICD-10-CM

## 2018-08-21 LAB
ALBUMIN SERPL BCP-MCNC: 3.7 G/DL
ALP SERPL-CCNC: 69 U/L
ALT SERPL W/O P-5'-P-CCNC: 9 U/L
ANION GAP SERPL CALC-SCNC: 9 MMOL/L
AST SERPL-CCNC: 15 U/L
BILIRUB SERPL-MCNC: 0.3 MG/DL
BUN SERPL-MCNC: 14 MG/DL
CALCIUM SERPL-MCNC: 9.5 MG/DL
CHLORIDE SERPL-SCNC: 106 MMOL/L
CO2 SERPL-SCNC: 26 MMOL/L
CREAT SERPL-MCNC: 1.2 MG/DL
EST. GFR  (AFRICAN AMERICAN): >60 ML/MIN/1.73 M^2
EST. GFR  (NON AFRICAN AMERICAN): >60 ML/MIN/1.73 M^2
ESTIMATED AVG GLUCOSE: 146 MG/DL
GLUCOSE SERPL-MCNC: 141 MG/DL
HBA1C MFR BLD HPLC: 6.7 %
POTASSIUM SERPL-SCNC: 4.4 MMOL/L
PROT SERPL-MCNC: 7.3 G/DL
SODIUM SERPL-SCNC: 141 MMOL/L
TSH SERPL DL<=0.005 MIU/L-ACNC: 1.39 UIU/ML

## 2018-08-21 PROCEDURE — 84443 ASSAY THYROID STIM HORMONE: CPT

## 2018-08-21 PROCEDURE — 83036 HEMOGLOBIN GLYCOSYLATED A1C: CPT

## 2018-08-21 PROCEDURE — 36415 COLL VENOUS BLD VENIPUNCTURE: CPT

## 2018-08-21 PROCEDURE — 80053 COMPREHEN METABOLIC PANEL: CPT

## 2018-09-03 RX ORDER — PREDNISONE 5 MG/1
5 TABLET ORAL DAILY
Qty: 30 TABLET | Refills: 11 | Status: ON HOLD | OUTPATIENT
Start: 2018-09-03 | End: 2019-03-16 | Stop reason: HOSPADM

## 2018-09-11 ENCOUNTER — LAB VISIT (OUTPATIENT)
Dept: LAB | Facility: HOSPITAL | Age: 63
End: 2018-09-11
Attending: INTERNAL MEDICINE
Payer: COMMERCIAL

## 2018-09-11 DIAGNOSIS — Z94.0 KIDNEY REPLACED BY TRANSPLANT: ICD-10-CM

## 2018-09-11 LAB
CREAT UR-MCNC: 55 MG/DL
PROT UR-MCNC: 22 MG/DL
PROT/CREAT UR: 0.4 MG/G{CREAT}

## 2018-09-11 PROCEDURE — 84156 ASSAY OF PROTEIN URINE: CPT

## 2018-10-09 ENCOUNTER — LAB VISIT (OUTPATIENT)
Dept: LAB | Facility: HOSPITAL | Age: 63
End: 2018-10-09
Attending: INTERNAL MEDICINE
Payer: COMMERCIAL

## 2018-10-09 DIAGNOSIS — Z94.0 KIDNEY REPLACED BY TRANSPLANT: ICD-10-CM

## 2018-10-09 LAB
CREAT UR-MCNC: 76 MG/DL
PROT UR-MCNC: 28 MG/DL
PROT/CREAT UR: 0.37 MG/G{CREAT}

## 2018-10-09 PROCEDURE — 84156 ASSAY OF PROTEIN URINE: CPT

## 2018-11-06 ENCOUNTER — LAB VISIT (OUTPATIENT)
Dept: LAB | Facility: HOSPITAL | Age: 63
End: 2018-11-06
Attending: INTERNAL MEDICINE
Payer: COMMERCIAL

## 2018-11-06 DIAGNOSIS — Z94.0 KIDNEY REPLACED BY TRANSPLANT: ICD-10-CM

## 2018-11-06 LAB
ALBUMIN SERPL BCP-MCNC: 3.8 G/DL
ALBUMIN SERPL BCP-MCNC: 3.8 G/DL
ALP SERPL-CCNC: 77 U/L
ALT SERPL W/O P-5'-P-CCNC: 9 U/L
ANION GAP SERPL CALC-SCNC: 8 MMOL/L
AST SERPL-CCNC: 18 U/L
BASOPHILS # BLD AUTO: 0.03 K/UL
BASOPHILS NFR BLD: 0.9 %
BILIRUB DIRECT SERPL-MCNC: 0.2 MG/DL
BILIRUB SERPL-MCNC: 0.3 MG/DL
BUN SERPL-MCNC: 14 MG/DL
CALCIUM SERPL-MCNC: 9.8 MG/DL
CHLORIDE SERPL-SCNC: 105 MMOL/L
CO2 SERPL-SCNC: 29 MMOL/L
CREAT SERPL-MCNC: 1.2 MG/DL
DIFFERENTIAL METHOD: ABNORMAL
EOSINOPHIL # BLD AUTO: 0.1 K/UL
EOSINOPHIL NFR BLD: 2.4 %
ERYTHROCYTE [DISTWIDTH] IN BLOOD BY AUTOMATED COUNT: 17.2 %
EST. GFR  (AFRICAN AMERICAN): >60 ML/MIN/1.73 M^2
EST. GFR  (NON AFRICAN AMERICAN): >60 ML/MIN/1.73 M^2
GLUCOSE SERPL-MCNC: 180 MG/DL
HCT VFR BLD AUTO: 43.7 %
HGB BLD-MCNC: 13.4 G/DL
IMM GRANULOCYTES # BLD AUTO: 0 K/UL
IMM GRANULOCYTES NFR BLD AUTO: 0 %
LYMPHOCYTES # BLD AUTO: 1 K/UL
LYMPHOCYTES NFR BLD: 31 %
MAGNESIUM SERPL-MCNC: 2.2 MG/DL
MCH RBC QN AUTO: 23.9 PG
MCHC RBC AUTO-ENTMCNC: 30.7 G/DL
MCV RBC AUTO: 78 FL
MONOCYTES # BLD AUTO: 0.7 K/UL
MONOCYTES NFR BLD: 19.9 %
NEUTROPHILS # BLD AUTO: 1.5 K/UL
NEUTROPHILS NFR BLD: 45.8 %
NRBC BLD-RTO: 0 /100 WBC
PHOSPHATE SERPL-MCNC: 2.5 MG/DL
PLATELET # BLD AUTO: 121 K/UL
PLATELET BLD QL SMEAR: ABNORMAL
PMV BLD AUTO: ABNORMAL FL
POTASSIUM SERPL-SCNC: 4.3 MMOL/L
PROT SERPL-MCNC: 7.6 G/DL
RBC # BLD AUTO: 5.6 M/UL
SODIUM SERPL-SCNC: 142 MMOL/L
TACROLIMUS BLD-MCNC: 2.8 NG/ML
WBC # BLD AUTO: 3.32 K/UL

## 2018-11-06 PROCEDURE — 80069 RENAL FUNCTION PANEL: CPT

## 2018-11-06 PROCEDURE — 84155 ASSAY OF PROTEIN SERUM: CPT

## 2018-11-06 PROCEDURE — 85025 COMPLETE CBC W/AUTO DIFF WBC: CPT

## 2018-11-06 PROCEDURE — 82247 BILIRUBIN TOTAL: CPT

## 2018-11-06 PROCEDURE — 80197 ASSAY OF TACROLIMUS: CPT

## 2018-11-06 PROCEDURE — 87799 DETECT AGENT NOS DNA QUANT: CPT

## 2018-11-06 PROCEDURE — 84075 ASSAY ALKALINE PHOSPHATASE: CPT

## 2018-11-06 PROCEDURE — 83735 ASSAY OF MAGNESIUM: CPT

## 2018-11-06 RX ORDER — TACROLIMUS 0.5 MG/1
CAPSULE ORAL
Qty: 90 CAPSULE | Refills: 11 | Status: SHIPPED | OUTPATIENT
Start: 2018-11-06 | End: 2018-11-12

## 2018-11-06 NOTE — TELEPHONE ENCOUNTER
Made patient's wife aware since had to leave a message on patients line. She verbalized understanding. Patient aware to go for repeat labs on 11/13/18.

## 2018-11-06 NOTE — TELEPHONE ENCOUNTER
----- Message from Sylvie Frias MD sent at 11/6/2018  3:20 PM CST -----  Prograf lower than goal --> increase Prograf from 0.5 mg BID to 1/0.5. Repeat labs in 7-10 days.

## 2018-11-12 ENCOUNTER — OFFICE VISIT (OUTPATIENT)
Dept: TRANSPLANT | Facility: CLINIC | Age: 63
End: 2018-11-12
Payer: COMMERCIAL

## 2018-11-12 ENCOUNTER — LAB VISIT (OUTPATIENT)
Dept: LAB | Facility: HOSPITAL | Age: 63
End: 2018-11-12
Attending: INTERNAL MEDICINE
Payer: COMMERCIAL

## 2018-11-12 VITALS
HEART RATE: 57 BPM | DIASTOLIC BLOOD PRESSURE: 97 MMHG | RESPIRATION RATE: 16 BRPM | OXYGEN SATURATION: 99 % | SYSTOLIC BLOOD PRESSURE: 186 MMHG | BODY MASS INDEX: 26.59 KG/M2 | TEMPERATURE: 98 F | WEIGHT: 200.63 LBS | HEIGHT: 73 IN

## 2018-11-12 DIAGNOSIS — Z86.19 HISTORY OF HEPATITIS C: ICD-10-CM

## 2018-11-12 DIAGNOSIS — Z94.0 KIDNEY REPLACED BY TRANSPLANT: ICD-10-CM

## 2018-11-12 DIAGNOSIS — Z79.60 LONG-TERM USE OF IMMUNOSUPPRESSANT MEDICATION: ICD-10-CM

## 2018-11-12 DIAGNOSIS — Z94.0 IMMUNOSUPPRESSIVE MANAGEMENT ENCOUNTER FOLLOWING KIDNEY TRANSPLANT: ICD-10-CM

## 2018-11-12 DIAGNOSIS — N25.81 HYPERPARATHYROIDISM DUE TO RENAL INSUFFICIENCY: ICD-10-CM

## 2018-11-12 DIAGNOSIS — I10 ESSENTIAL HYPERTENSION: ICD-10-CM

## 2018-11-12 DIAGNOSIS — Z94.0 S/P KIDNEY TRANSPLANT: ICD-10-CM

## 2018-11-12 DIAGNOSIS — E78.5 HYPERLIPIDEMIA, UNSPECIFIED HYPERLIPIDEMIA TYPE: ICD-10-CM

## 2018-11-12 DIAGNOSIS — D63.8 ANEMIA OF CHRONIC DISEASE: ICD-10-CM

## 2018-11-12 DIAGNOSIS — N18.2 CKD (CHRONIC KIDNEY DISEASE) STAGE 2, GFR 60-89 ML/MIN: Primary | Chronic | ICD-10-CM

## 2018-11-12 DIAGNOSIS — Z79.899 IMMUNOSUPPRESSIVE MANAGEMENT ENCOUNTER FOLLOWING KIDNEY TRANSPLANT: ICD-10-CM

## 2018-11-12 LAB
ALBUMIN SERPL BCP-MCNC: 3.5 G/DL
ANION GAP SERPL CALC-SCNC: 9 MMOL/L
BASOPHILS # BLD AUTO: 0.04 K/UL
BASOPHILS NFR BLD: 0.8 %
BUN SERPL-MCNC: 15 MG/DL
CALCIUM SERPL-MCNC: 9.2 MG/DL
CHLORIDE SERPL-SCNC: 106 MMOL/L
CO2 SERPL-SCNC: 27 MMOL/L
CREAT SERPL-MCNC: 1 MG/DL
DIFFERENTIAL METHOD: ABNORMAL
EOSINOPHIL # BLD AUTO: 0.1 K/UL
EOSINOPHIL NFR BLD: 1.4 %
ERYTHROCYTE [DISTWIDTH] IN BLOOD BY AUTOMATED COUNT: 16.8 %
EST. GFR  (AFRICAN AMERICAN): >60 ML/MIN/1.73 M^2
EST. GFR  (NON AFRICAN AMERICAN): >60 ML/MIN/1.73 M^2
GLUCOSE SERPL-MCNC: 111 MG/DL
HCT VFR BLD AUTO: 41.4 %
HGB BLD-MCNC: 13.1 G/DL
IMM GRANULOCYTES # BLD AUTO: 0.03 K/UL
IMM GRANULOCYTES NFR BLD AUTO: 0.6 %
LYMPHOCYTES # BLD AUTO: 1.4 K/UL
LYMPHOCYTES NFR BLD: 27 %
MCH RBC QN AUTO: 23.9 PG
MCHC RBC AUTO-ENTMCNC: 31.6 G/DL
MCV RBC AUTO: 76 FL
MONOCYTES # BLD AUTO: 0.9 K/UL
MONOCYTES NFR BLD: 17.6 %
NEUTROPHILS # BLD AUTO: 2.7 K/UL
NEUTROPHILS NFR BLD: 52.6 %
NRBC BLD-RTO: 0 /100 WBC
PHOSPHATE SERPL-MCNC: 3.1 MG/DL
PLATELET # BLD AUTO: 149 K/UL
PMV BLD AUTO: ABNORMAL FL
POTASSIUM SERPL-SCNC: 3.7 MMOL/L
RBC # BLD AUTO: 5.48 M/UL
SODIUM SERPL-SCNC: 142 MMOL/L
TACROLIMUS BLD-MCNC: 3.1 NG/ML
WBC # BLD AUTO: 5.11 K/UL

## 2018-11-12 PROCEDURE — 36415 COLL VENOUS BLD VENIPUNCTURE: CPT

## 2018-11-12 PROCEDURE — 99215 OFFICE O/P EST HI 40 MIN: CPT | Mod: S$GLB,,, | Performed by: INTERNAL MEDICINE

## 2018-11-12 PROCEDURE — 99999 PR PBB SHADOW E&M-EST. PATIENT-LVL IV: CPT | Mod: PBBFAC,,, | Performed by: INTERNAL MEDICINE

## 2018-11-12 PROCEDURE — 3080F DIAST BP >= 90 MM HG: CPT | Mod: CPTII,S$GLB,, | Performed by: INTERNAL MEDICINE

## 2018-11-12 PROCEDURE — 3008F BODY MASS INDEX DOCD: CPT | Mod: CPTII,S$GLB,, | Performed by: INTERNAL MEDICINE

## 2018-11-12 PROCEDURE — 80069 RENAL FUNCTION PANEL: CPT

## 2018-11-12 PROCEDURE — 3077F SYST BP >= 140 MM HG: CPT | Mod: CPTII,S$GLB,, | Performed by: INTERNAL MEDICINE

## 2018-11-12 PROCEDURE — 80197 ASSAY OF TACROLIMUS: CPT

## 2018-11-12 PROCEDURE — 85025 COMPLETE CBC W/AUTO DIFF WBC: CPT

## 2018-11-12 RX ORDER — TACROLIMUS 0.5 MG/1
CAPSULE ORAL
Qty: 120 CAPSULE | Refills: 11 | Status: SHIPPED | OUTPATIENT
Start: 2018-11-12 | End: 2019-01-24 | Stop reason: SDUPTHER

## 2018-11-12 NOTE — PROGRESS NOTES
POST TRANSPLANT CLINIC NOTE    HOMERO met with patient in post clinic to follow up regarding any needs post transplant,assess coping and insurance. Pt is a transplant recipient from 11/5/2016.SW provided insurance education and reports Medicare will end at 36 months post transplant.  Patient reports he never enrolled in Medicare and has only utilized [facility] through employer. Pt reports he spoke with financial to sort concerns.  Patient expressed no psychosocial needs or concerns at this time, reports no issues with obtaining medications, and reports receiving medications from [facility]. Pt reports knowing how to contact SW team if any additional needs arise and HOMERO remains available at [phone].

## 2018-11-12 NOTE — PATIENT INSTRUCTIONS
1. Increase Prograf to 1 mg (2 capsules of the 0.5 mg pills) twice a day   2. We will set you up for repeat labs in 7-10 days  3. Stop magnesium supplement   4. Stay active   5. See a dermatology every year   6. Make sure to see a general nephrologist every 3 months

## 2018-11-12 NOTE — PROGRESS NOTES
Kidney Post-Transplant Assessment    Referring Physician: Alexi Glasgow  Current Nephrologist: Alexi Glasgow    ORGAN: RIGHT KIDNEY  Donor Type:  - brain death  PHS Increased Risk: yes  Cold Ischemia: 314 mins  Induction Medications: thymoglobulin    Subjective:     CC:  Reassessment of renal allograft function and management of chronic immunosuppression.    Kidney History:  Mr. Zamorano is a 63 y.o. year old Black or  male with history of ESRD presumed secondary to DM/HTN who was on RRT since 5/6/15 until receiving PHS increased risk DDRT (THYMO induction given HCV positive donor and recipient, KDPI 85%, WIT 27 minutes, CIT 5 hours and 14 minutes, donor RPR positive thus recipient received PCN x3 doses; CMV D+/R+) on 16. He started Harvoni on 17 and has SVR; HCV PCR negative since 17. He has CKD stage 2 - GFR 60-89 and his baseline creatinine is between 1.0 to 1.3. He takes mycophenolate mofetil, prednisone and tacrolimus for maintenance immunosuppression.     Interval History: Patient last seen in transplant nephrology clinic on 18. Since last visit he denies any hospitalizations or ER visits. He isn't really doing much activity. States the only activity that he is doing now is just cutting the yard. He states his home BPs are in the 120s-140s/70-80s. He has been taking nifedipine 30 mg when his SBP is >140 - states over the last month he has been taking it twice a day but mostly was using it in the evenings only. He lost 7 lbs since last visit. States he got the flu shot already.     Review of Systems  Constitutional: Negative for fever, appetite change and fatigue.    HENT: +intermittent vertigo - last episode last month; Negative for hearing loss, sore throat and mouth sores.   Eyes: +right eye floater; Negative for photophobia, pain and visual disturbance.   Respiratory: Negative for cough, chest tightness, shortness of breath and wheezing.    Cardiovascular: Negative for chest pain, palpitations and leg swelling.   Gastrointestinal: Negative for nausea, vomiting, abdominal pain, diarrhea, constipation, blood in stool and abdominal distention.   Genitourinary: +intermittent foamy urine for the last 6 months; Negative for dysuria, urgency, frequency, hematuria, decreased urine volume, difficulty urinating  Musculoskeletal: Negative for back pain, joint swelling, arthralgias and gait problem.   Skin: Negative for pallor, rash and wound.   Neurological: +mild tremors; Negative for syncope, weakness, light-headedness and headaches.   Hematological: Negative for adenopathy. Does not bruise/bleed easily.   Psychiatric/Behavioral: +sleep disturbance - improved with melatonin; Negative for confusion and dysphoric mood. The patient is not nervous/anxious.     Medications:   Current Outpatient Medications   Medication Sig Dispense Refill    aspirin 81 MG Chew Take 81 mg by mouth once daily.      atorvastatin (LIPITOR) 10 MG tablet Take 10 mg by mouth once daily.      blood sugar diagnostic Strp 1 strip by Misc.(Non-Drug; Combo Route) route 4 (four) times daily before meals and nightly. e11.65 100 strip 5    blood sugar diagnostic Strp Test blood sugar four times daily 200 each 6    blood-glucose meter (FREESTYLE SYSTEM KIT) kit Use as instructed e11.65 may substitute meter as covered by insurance 1 each 0    carvedilol (COREG) 12.5 MG tablet TAKE 1 TABLET BY MOUTH TWICE DAILY 60 tablet 6    cholecalciferol, vitamin D3, 1,000 unit capsule Take 1 capsule (1,000 Units total) by mouth once daily.  0    dulaglutide (TRULICITY) 0.75 mg/0.5 mL PnIj INJECT 0.5 MLS (0.75 MG TOTAL) INTO THE SKIN EVERY 7 DAYS. 2 mL 6    famotidine (PEPCID) 20 MG tablet Take 1 tablet (20 mg total) by mouth every evening. 90 tablet 3    gabapentin (NEURONTIN) 300 MG capsule TAKE ONE CAPSULE BY MOUTH THREE TIMES A DAY 90 capsule 4    insulin detemir U-100 (LEVEMIR FLEXTOUCH) 100  "unit/mL (3 mL) SubQ InPn pen Inject 35 units into the skin at night. 15 mL 4    insulin lispro (HUMALOG KWIKPEN INSULIN) 100 unit/mL InPn pen Inject 18 units w/ breakfast, 16 units w/ lunch and dinner plus scale 150-200 +2, 201-250 +4, 251-300 +6, 301-350 +8. 30 mL 4    lancets Misc 1 each by Misc.(Non-Drug; Combo Route) route 4 (four) times daily before meals and nightly. e11.65 100 each 0    magnesium oxide (MAG-OX) 400 mg tablet Take 1 tablet (400 mg total) by mouth once daily. (Patient taking differently: Take 400 mg by mouth 2 (two) times daily. )  0    melatonin 5 mg Tab Take 1 tablet (5 mg total) by mouth every evening.      multivitamin (THERAGRAN) tablet Take 1 tablet by mouth once daily.      mycophenolate (CELLCEPT) 250 mg Cap Take 4 capsules (1,000 mg total) by mouth 2 (two) times daily. 240 capsule 11    NIFEdipine (PROCARDIA-XL) 30 MG (OSM) 24 hr tablet Take 1 tablet (30 mg total) by mouth once daily. 30 tablet 11    pen needle, diabetic 32 gauge x 5/32" Ndle USE TO ADMINISTER INSULIN 4 TIMES DAILY. 400 each 3    predniSONE (DELTASONE) 5 MG tablet Take 1 tablet (5 mg total) by mouth once daily. 30 tablet 11    tacrolimus (PROGRAF) 0.5 MG Cap Take 1mg ( 2 capsules)  orally in the am and 0.5mg (1 capsule) orally in the pm 90 capsule 11    tamsulosin (FLOMAX) 0.4 mg Cap TAKE ONE CAPSULE BY MOUTH DAILY AFTER DINNER 90 capsule 3    meclizine (ANTIVERT) 25 mg tablet Take 1 tablet (25 mg total) by mouth 3 (three) times daily as needed for Dizziness. 21 tablet 0     No current facility-administered medications for this visit.          Objective:     Blood pressure (!) 186/97, pulse (!) 57, temperature 97.8 °F (36.6 °C), temperature source Oral, resp. rate 16, height 6' 0.76" (1.848 m), weight 91 kg (200 lb 9.9 oz), SpO2 99 %.body mass index is 26.65 kg/m².    Physical Exam  General: No acute distress, well groomed, alert and oriented x 3  HEENT: Normocephalic, atraumatic, EOM's intact bilaterally, " external inspection of ears and nose normal, moist mucous membranes, no oral ulcerations/lesions  Neck: Supple, symmetrical, trachea midline, no thyromegaly, no JVD  Respiratory: Clear to auscultation bilaterally, respirations unlabored, no rales/rhonchi/wheezing  Cardiovacular: Regular rate and rhythm, S1, S2 normal, no murmurs, rubs or gallops  Gastrointestinal: Soft, non-tender, bowel sounds normal, no hepatosplenomegaly  Renal allograft exam: No tenderness, no bruits, normal exam  Musculoskeletal: No knee or ankle joint tenderness or swelling.   Extremities: No clubbing or cyanosis of bilateral upper extremities; no lower extremity edema bilaterally, radial pulses 2+ bilaterally, symmetric  Skin: warm and dry; no rash on exposed skin  Neurologic: CN grossly intact    Labs:  Lab Results   Component Value Date    WBC 5.11 11/12/2018    HGB 13.1 (L) 11/12/2018    HCT 41.4 11/12/2018     11/12/2018    K 3.7 11/12/2018     11/12/2018    CO2 27 11/12/2018    BUN 15 11/12/2018    CREATININE 1.0 11/12/2018    EGFRNONAA >60.0 11/12/2018    CALCIUM 9.2 11/12/2018    PHOS 3.1 11/12/2018    MG 2.2 11/06/2018    ALBUMIN 3.5 11/12/2018    AST 18 11/06/2018    ALT 9 (L) 11/06/2018    UTPCR 0.53 (H) 11/06/2018    .0 (H) 02/05/2017    TACROLIMUS 3.1 (L) 11/12/2018       No results found for: EXTANC, EXTWBC, EXTSEGS, EXTPLATELETS, EXTHEMOGLOBI, EXTHEMATOCRI, EXTCREATININ, EXTSODIUM, EXTPOTASSIUM, EXTBUN, EXTCO2, EXTCALCIUM, EXTPHOSPHORU, EXTGLUCOSE, EXTALBUMIN, EXTAST, EXTALT, EXTBILITOTAL, EXTLIPASE, EXTAMYLASE    No results found for: EXTCYCLOSLVL, EXTSIROLIMUS, EXTTACROLVL, EXTPROTCRE, EXTPTHINTACT, EXTPROTEINUA, EXTWBCUA, EXTRBCUA    Labs were reviewed with the patient.    Assessment/Plan:     1. CKD (chronic kidney disease) stage 2, GFR 60-89 ml/min    2. History of hepatitis C, s/p successful Rx w/ SVR24 - 9/2017    3. Hyperparathyroidism due to renal insufficiency    4. S/P cadaveric kidney transplant  11/5/2016. ESRD secondary to HTN/DMII    5. Anemia of chronic disease    6. Hyperlipidemia, unspecified hyperlipidemia type    7. Essential hypertension    8. Long-term use of immunosuppressant medication    9. Immunosuppressive management encounter following kidney transplant        Mr. Zamorano is a 63 y.o. male with:     # History of ESRD presumed secondary to DM/HTN who was on RRT since 5/6/15 until receiving PHS increased risk DDRT (THYMO induction given HCV positive donor and recipient, KDPI 85%, WIT 27 minutes, CIT 5 hours and 14 minutes, donor RPR positive thus recipient received PCN x3 doses; CMV D+/R+) on 11/5/16: baseline Cr 1.0 to 1.3  - last Cr within baseline at 1.0 from 11/12/18  - last UPC 0.53 from 11/6/18  - his UPC was noted to rise to 1.18 on 5/1/18 but 24 hour urine collection only 637 mg thus decided to just monitor proteinuria for now  - counseled him on recommendation to see his general nephrologist [email or web address]    # Immunosuppression:   - increase Prograf from 1/0.5 to 1 mg BID given last Prograf level low at 3.1 from 11/12/18; goal trough 4-7 --> repeat labs in 7-10 days  - continue MMF 1000 mg BID  - continue prednisone 5 mg daily  - continue to monitor for toxicities from immunosuppressive medications     # Infectious Surveillance:   - last BK serum PCR negative from 11/6/18  - last CMV PCR negative from 3/8/17    # HTN: BPs stable at home per patient; elevated in clinic  - continue current anti-hypertensive regimen  - defer adjustments to BP meds to his general nephrologist  - continue with home blood pressure monitoring  - low salt and healthy life discussed with the patient     # Metabolic Bone Disease/Secondary Hyperparathyroidism: last calcium/phos normal  - will monitor PTH, calcium, and phosphorus as per our center protocol    # Hypomagnesemia: Mg level on higher end at 2.2 from 11/6/18  - discontinue MagOx 400 mg daily      # DM: last HbA1c 6.7% from 8/21/18  - counseled patient on  importance of optimizing glycemic control   - continue following up with endocrinology     # Anemia of chronic disease: Hb stable at 13.1  - will continue monitoring as per our center guidelines. No indication for acute intervention today     # Hepatitis C: genotype 1a; started Harvoni on 1/12/17; HCV PCR negative since 2/8/17; last HCV PCR negative from 9/29/17     # Vitamin D Deficiency: vitamin D level improved from 15 (11/8/16) --> 39 (2/5/17) thus discontinued ergocalciferol and started cholecalciferol 1000 units daily   - continue cholecalciferol 1000 units daily     # Healthcare Maintenance:   - advised patient to see a dermatologist annually given increased risk of skin cancers with immunosuppressive medications - will refer to dermatology here at [facility] per patient request    Follow-up:   Clinic: return to transplant clinic weekly for the first month after transplant; every 2 weeks during months 2-3; then at 6-, 9-, 12-, 18-, 24-, and 36- months post-transplant to reassess for complications from immunosuppression toxicity and monitor for rejection.  Annually thereafter.    Labs: since patient remains at high risk for rejection and drug-related complications that warrant close monitoring, labs will be ordered as follows: continue twice weekly CBC, renal panel, and drug level for first month; then same labs once weekly through 3rd month post-transplant.  Urine for UA and protein/creatinine ratio monthly.  Serum BK - PCR at 1-, 3-, 6-, 9-, 12-, 18-, 24-, and 36- months post-transplant.  Hepatic panel at 1-, 2-, 3-, 6-, 9-, 12-, 18-, 24-, and 36- months post-transplant.    Sylvie Frias MD       Education:   Material provided to the patient.  Patient reminded to call with any health changes since these can be early signs of significant complications.  Also, I advised the patient to be sure any new medications or changes of old medications are discussed with either a pharmacist or physician knowledgeable with  transplant to avoid rejection/drug toxicity related to significant drug interactions.    UNOS Patient Status  Functional Status: 100% - Normal, no complaints, no evidence of disease  Physical Capacity: No Limitations

## 2018-11-12 NOTE — LETTER
November 12, 2018        Olayinka Ewing  [address]  [facility] [address]  Phone: [phone]  Fax: [phone]             [facility]  [address]  [facility] [address]  Phone: [phone]   Patient: Ravi Zamorano   MR Number: [id number]   YOB: 1955   Date of Visit: 11/12/2018       Dear Dr. Olayinka Ewing    Thank you for referring Ravi Zamorano to me for evaluation. Attached you will find relevant portions of my assessment and plan of care.    If you have questions, please do not hesitate to call me. I look forward to following Ravi Zamorano along with you.    Sincerely,    Sylvie Frias MD    Enclosure    If you would like to receive this communication electronically, please contact [email or web address] or [phone] to request Contentful Link access.    Contentful Link is a tool which provides read-only access to select patient information with whom you have a relationship. Its easy to use and provides real time access to review your patients record including encounter summaries, notes, results, and demographic information.    If you feel you have received this communication in error or would no longer like to receive these types of communications, please e-mail [email or web address]

## 2018-11-16 ENCOUNTER — LAB VISIT (OUTPATIENT)
Dept: LAB | Facility: HOSPITAL | Age: 63
End: 2018-11-16
Attending: INTERNAL MEDICINE
Payer: COMMERCIAL

## 2018-11-16 DIAGNOSIS — Z94.0 KIDNEY REPLACED BY TRANSPLANT: ICD-10-CM

## 2018-11-16 LAB
ALBUMIN SERPL BCP-MCNC: 3.5 G/DL
ANION GAP SERPL CALC-SCNC: 7 MMOL/L
BASOPHILS # BLD AUTO: 0.05 K/UL
BASOPHILS NFR BLD: 1 %
BUN SERPL-MCNC: 14 MG/DL
CALCIUM SERPL-MCNC: 9.3 MG/DL
CHLORIDE SERPL-SCNC: 106 MMOL/L
CO2 SERPL-SCNC: 29 MMOL/L
CREAT SERPL-MCNC: 1.1 MG/DL
DIFFERENTIAL METHOD: ABNORMAL
EOSINOPHIL # BLD AUTO: 0.1 K/UL
EOSINOPHIL NFR BLD: 1.8 %
ERYTHROCYTE [DISTWIDTH] IN BLOOD BY AUTOMATED COUNT: 16.8 %
EST. GFR  (AFRICAN AMERICAN): >60 ML/MIN/1.73 M^2
EST. GFR  (NON AFRICAN AMERICAN): >60 ML/MIN/1.73 M^2
GLUCOSE SERPL-MCNC: 104 MG/DL
HCT VFR BLD AUTO: 42.1 %
HGB BLD-MCNC: 12.8 G/DL
IMM GRANULOCYTES # BLD AUTO: 0.02 K/UL
IMM GRANULOCYTES NFR BLD AUTO: 0.4 %
LYMPHOCYTES # BLD AUTO: 1.4 K/UL
LYMPHOCYTES NFR BLD: 27.3 %
MAGNESIUM SERPL-MCNC: 1.8 MG/DL
MCH RBC QN AUTO: 23.5 PG
MCHC RBC AUTO-ENTMCNC: 30.4 G/DL
MCV RBC AUTO: 77 FL
MONOCYTES # BLD AUTO: 1 K/UL
MONOCYTES NFR BLD: 20.4 %
NEUTROPHILS # BLD AUTO: 2.5 K/UL
NEUTROPHILS NFR BLD: 49.1 %
NRBC BLD-RTO: 0 /100 WBC
PHOSPHATE SERPL-MCNC: 3 MG/DL
PLATELET # BLD AUTO: 137 K/UL
PMV BLD AUTO: ABNORMAL FL
POTASSIUM SERPL-SCNC: 4.1 MMOL/L
RBC # BLD AUTO: 5.44 M/UL
SODIUM SERPL-SCNC: 142 MMOL/L
TACROLIMUS BLD-MCNC: 4.8 NG/ML
WBC # BLD AUTO: 5.09 K/UL

## 2018-11-16 PROCEDURE — 83735 ASSAY OF MAGNESIUM: CPT

## 2018-11-16 PROCEDURE — 80197 ASSAY OF TACROLIMUS: CPT

## 2018-11-16 PROCEDURE — 85025 COMPLETE CBC W/AUTO DIFF WBC: CPT

## 2018-11-16 PROCEDURE — 36415 COLL VENOUS BLD VENIPUNCTURE: CPT

## 2018-11-16 PROCEDURE — 80069 RENAL FUNCTION PANEL: CPT

## 2018-11-20 RX ORDER — GABAPENTIN 300 MG/1
300 CAPSULE ORAL 3 TIMES DAILY
Qty: 90 CAPSULE | Refills: 4 | Status: SHIPPED | OUTPATIENT
Start: 2018-11-20 | End: 2019-06-04

## 2018-11-30 DIAGNOSIS — E11.9 TYPE 2 DIABETES MELLITUS WITHOUT COMPLICATION: ICD-10-CM

## 2018-12-11 ENCOUNTER — LAB VISIT (OUTPATIENT)
Dept: LAB | Facility: HOSPITAL | Age: 63
End: 2018-12-11
Attending: INTERNAL MEDICINE
Payer: COMMERCIAL

## 2018-12-11 DIAGNOSIS — Z94.0 KIDNEY REPLACED BY TRANSPLANT: ICD-10-CM

## 2018-12-11 LAB
CREAT UR-MCNC: 70 MG/DL
PROT UR-MCNC: 21 MG/DL
PROT/CREAT UR: 0.3 MG/G{CREAT}

## 2018-12-11 PROCEDURE — 82570 ASSAY OF URINE CREATININE: CPT

## 2018-12-14 ENCOUNTER — TELEPHONE (OUTPATIENT)
Dept: TRANSPLANT | Facility: CLINIC | Age: 63
End: 2018-12-14

## 2018-12-14 NOTE — TELEPHONE ENCOUNTER
Patient requested a referral to a new Nephrologist, due to not having his calls returned in order to get an apt. Explained to patient that their is currently a wait list to get in.   Patient states he instead will go to his Nephrologist office personally and get an apt himself.

## 2018-12-27 DIAGNOSIS — E11.42 TYPE 2 DIABETES MELLITUS WITH POLYNEUROPATHY: ICD-10-CM

## 2018-12-27 RX ORDER — CARVEDILOL 12.5 MG/1
TABLET ORAL
Qty: 60 TABLET | Refills: 6 | Status: ON HOLD | OUTPATIENT
Start: 2018-12-27 | End: 2019-03-16 | Stop reason: HOSPADM

## 2019-01-15 RX ORDER — ATORVASTATIN CALCIUM 10 MG/1
10 TABLET, FILM COATED ORAL DAILY
Qty: 90 TABLET | Refills: 3 | Status: SHIPPED | OUTPATIENT
Start: 2019-01-15 | End: 2019-03-14 | Stop reason: SDUPTHER

## 2019-01-15 NOTE — TELEPHONE ENCOUNTER
----- Message from Kaia Alvares sent at 1/15/2019 12:46 PM CST -----  Contact: [phone]  Type: Rx    Name of medication(s):  atorvastatin (LIPITOR) 10 MG tablet    Is this a refill? New rx? Refill      Who prescribed medication?    Pharmacy Name, Phone, & Location: [facility]  on [address]     Comments: please call and advise, Thanks

## 2019-01-24 DIAGNOSIS — Z94.0 KIDNEY REPLACED BY TRANSPLANT: ICD-10-CM

## 2019-01-24 RX ORDER — TACROLIMUS 0.5 MG/1
CAPSULE ORAL
Qty: 120 CAPSULE | Refills: 11 | Status: SHIPPED | OUTPATIENT
Start: 2019-01-24 | End: 2019-11-11 | Stop reason: SDUPTHER

## 2019-01-30 RX ORDER — INSULIN LISPRO 100 [IU]/ML
INJECTION, SOLUTION INTRAVENOUS; SUBCUTANEOUS
Qty: 30 ML | Refills: 4 | Status: SHIPPED | OUTPATIENT
Start: 2019-01-30 | End: 2019-11-26

## 2019-03-01 DIAGNOSIS — E11.9 TYPE 2 DIABETES MELLITUS WITHOUT COMPLICATION: ICD-10-CM

## 2019-03-14 RX ORDER — ATORVASTATIN CALCIUM 10 MG/1
10 TABLET, FILM COATED ORAL DAILY
Qty: 90 TABLET | Refills: 3 | Status: SHIPPED | OUTPATIENT
Start: 2019-03-14 | End: 2019-08-07 | Stop reason: SDUPTHER

## 2019-03-14 NOTE — TELEPHONE ENCOUNTER
----- Message from Jaymie Quezada sent at 3/14/2019 12:24 PM CDT -----  Contact: [phone]  RX request - refill or new RX.  Is this a refill or new RX:  Refill   RX name and strength: atorvastatin (LIPITOR) 10 MG tablet    Local pharmacy or mail order pharmacy:  [facility] [address] AT [facility]   [phone] (Phone)  [phone] (Fax)        Comments:  Please advise, thanks

## 2019-03-15 ENCOUNTER — HOSPITAL ENCOUNTER (OUTPATIENT)
Facility: HOSPITAL | Age: 64
Discharge: HOME | End: 2019-03-16
Attending: EMERGENCY MEDICINE | Admitting: INTERNAL MEDICINE
Payer: COMMERCIAL

## 2019-03-15 DIAGNOSIS — N18.2 TYPE 2 DIABETES MELLITUS WITH STAGE 2 CHRONIC KIDNEY DISEASE, WITH LONG-TERM CURRENT USE OF INSULIN: ICD-10-CM

## 2019-03-15 DIAGNOSIS — I16.0 HYPERTENSIVE URGENCY: Primary | ICD-10-CM

## 2019-03-15 DIAGNOSIS — I10 HYPERTENSION, UNSPECIFIED TYPE: ICD-10-CM

## 2019-03-15 DIAGNOSIS — Z79.4 TYPE 2 DIABETES MELLITUS WITH STAGE 2 CHRONIC KIDNEY DISEASE, WITH LONG-TERM CURRENT USE OF INSULIN: ICD-10-CM

## 2019-03-15 DIAGNOSIS — E11.42 DIABETIC POLYNEUROPATHY ASSOCIATED WITH TYPE 2 DIABETES MELLITUS: ICD-10-CM

## 2019-03-15 DIAGNOSIS — R10.9 FLANK PAIN: ICD-10-CM

## 2019-03-15 DIAGNOSIS — E11.22 TYPE 2 DIABETES MELLITUS WITH STAGE 2 CHRONIC KIDNEY DISEASE, WITH LONG-TERM CURRENT USE OF INSULIN: ICD-10-CM

## 2019-03-15 LAB
ALBUMIN SERPL BCP-MCNC: 3.8 G/DL
ALP SERPL-CCNC: 81 U/L
ALT SERPL W/O P-5'-P-CCNC: 9 U/L
ANION GAP SERPL CALC-SCNC: 13 MMOL/L
AST SERPL-CCNC: 16 U/L
BACTERIA #/AREA URNS AUTO: NORMAL /HPF
BASOPHILS # BLD AUTO: 0.04 K/UL
BASOPHILS NFR BLD: 0.4 %
BILIRUB SERPL-MCNC: 0.4 MG/DL
BILIRUB UR QL STRIP: NEGATIVE
BUN SERPL-MCNC: 14 MG/DL
CALCIUM SERPL-MCNC: 9.6 MG/DL
CHLORIDE SERPL-SCNC: 102 MMOL/L
CLARITY UR REFRACT.AUTO: ABNORMAL
CO2 SERPL-SCNC: 23 MMOL/L
COLOR UR AUTO: YELLOW
CREAT SERPL-MCNC: 1 MG/DL
DIFFERENTIAL METHOD: ABNORMAL
EOSINOPHIL # BLD AUTO: 0 K/UL
EOSINOPHIL NFR BLD: 0.2 %
ERYTHROCYTE [DISTWIDTH] IN BLOOD BY AUTOMATED COUNT: 16 %
EST. GFR  (AFRICAN AMERICAN): >60 ML/MIN/1.73 M^2
EST. GFR  (NON AFRICAN AMERICAN): >60 ML/MIN/1.73 M^2
GLUCOSE SERPL-MCNC: 200 MG/DL
GLUCOSE UR QL STRIP: ABNORMAL
HCT VFR BLD AUTO: 43.1 %
HGB BLD-MCNC: 13.3 G/DL
HGB UR QL STRIP: NEGATIVE
HYALINE CASTS UR QL AUTO: 0 /LPF
IMM GRANULOCYTES # BLD AUTO: 0.05 K/UL
IMM GRANULOCYTES NFR BLD AUTO: 0.5 %
INR PPP: 1
KETONES UR QL STRIP: NEGATIVE
LACTATE SERPL-SCNC: 1.5 MMOL/L
LACTATE SERPL-SCNC: 3.3 MMOL/L
LEUKOCYTE ESTERASE UR QL STRIP: NEGATIVE
LIPASE SERPL-CCNC: 9 U/L
LYMPHOCYTES # BLD AUTO: 1.2 K/UL
LYMPHOCYTES NFR BLD: 12.5 %
MCH RBC QN AUTO: 23.8 PG
MCHC RBC AUTO-ENTMCNC: 30.9 G/DL
MCV RBC AUTO: 77 FL
MICROSCOPIC COMMENT: NORMAL
MONOCYTES # BLD AUTO: 1.2 K/UL
MONOCYTES NFR BLD: 13.1 %
NEUTROPHILS # BLD AUTO: 6.7 K/UL
NEUTROPHILS NFR BLD: 73.3 %
NITRITE UR QL STRIP: NEGATIVE
NRBC BLD-RTO: 0 /100 WBC
PH UR STRIP: 8 [PH] (ref 5–8)
PLATELET # BLD AUTO: 140 K/UL
PMV BLD AUTO: ABNORMAL FL
POCT GLUCOSE: 229 MG/DL (ref 70–110)
POTASSIUM SERPL-SCNC: 3.1 MMOL/L
PROT SERPL-MCNC: 7.5 G/DL
PROT UR QL STRIP: ABNORMAL
PROTHROMBIN TIME: 10.7 SEC
RBC # BLD AUTO: 5.58 M/UL
RBC #/AREA URNS AUTO: 1 /HPF (ref 0–4)
SODIUM SERPL-SCNC: 138 MMOL/L
SP GR UR STRIP: 1.01 (ref 1–1.03)
URN SPEC COLLECT METH UR: ABNORMAL
WBC # BLD AUTO: 9.18 K/UL
WBC #/AREA URNS AUTO: 0 /HPF (ref 0–5)
YEAST UR QL AUTO: NORMAL

## 2019-03-15 PROCEDURE — 93010 ELECTROCARDIOGRAM REPORT: CPT | Mod: ,,, | Performed by: INTERNAL MEDICINE

## 2019-03-15 PROCEDURE — 83690 ASSAY OF LIPASE: CPT

## 2019-03-15 PROCEDURE — 85610 PROTHROMBIN TIME: CPT

## 2019-03-15 PROCEDURE — 84443 ASSAY THYROID STIM HORMONE: CPT

## 2019-03-15 PROCEDURE — 85025 COMPLETE CBC W/AUTO DIFF WBC: CPT

## 2019-03-15 PROCEDURE — 99285 EMERGENCY DEPT VISIT HI MDM: CPT | Mod: 25

## 2019-03-15 PROCEDURE — 96361 HYDRATE IV INFUSION ADD-ON: CPT

## 2019-03-15 PROCEDURE — 25000003 PHARM REV CODE 250: Performed by: EMERGENCY MEDICINE

## 2019-03-15 PROCEDURE — G0378 HOSPITAL OBSERVATION PER HR: HCPCS

## 2019-03-15 PROCEDURE — 96374 THER/PROPH/DIAG INJ IV PUSH: CPT

## 2019-03-15 PROCEDURE — 99285 EMERGENCY DEPT VISIT HI MDM: CPT | Mod: ,,, | Performed by: EMERGENCY MEDICINE

## 2019-03-15 PROCEDURE — 99285 PR EMERGENCY DEPT VISIT,LEVEL V: ICD-10-PCS | Mod: ,,, | Performed by: EMERGENCY MEDICINE

## 2019-03-15 PROCEDURE — 82962 GLUCOSE BLOOD TEST: CPT

## 2019-03-15 PROCEDURE — 96375 TX/PRO/DX INJ NEW DRUG ADDON: CPT

## 2019-03-15 PROCEDURE — 80053 COMPREHEN METABOLIC PANEL: CPT

## 2019-03-15 PROCEDURE — 93005 ELECTROCARDIOGRAM TRACING: CPT

## 2019-03-15 PROCEDURE — 93010 EKG 12-LEAD: ICD-10-PCS | Mod: ,,, | Performed by: INTERNAL MEDICINE

## 2019-03-15 PROCEDURE — 83605 ASSAY OF LACTIC ACID: CPT | Mod: 91

## 2019-03-15 PROCEDURE — 63600175 PHARM REV CODE 636 W HCPCS: Performed by: EMERGENCY MEDICINE

## 2019-03-15 PROCEDURE — 96376 TX/PRO/DX INJ SAME DRUG ADON: CPT

## 2019-03-15 PROCEDURE — 81001 URINALYSIS AUTO W/SCOPE: CPT

## 2019-03-15 RX ORDER — POTASSIUM CHLORIDE 20 MEQ/15ML
40 SOLUTION ORAL
Status: COMPLETED | OUTPATIENT
Start: 2019-03-15 | End: 2019-03-15

## 2019-03-15 RX ORDER — SODIUM CHLORIDE 0.9 % (FLUSH) 0.9 %
5 SYRINGE (ML) INJECTION
Status: DISCONTINUED | OUTPATIENT
Start: 2019-03-16 | End: 2019-03-16 | Stop reason: HOSPADM

## 2019-03-15 RX ORDER — SODIUM CHLORIDE 9 MG/ML
500 INJECTION, SOLUTION INTRAVENOUS
Status: COMPLETED | OUTPATIENT
Start: 2019-03-15 | End: 2019-03-15

## 2019-03-15 RX ORDER — MORPHINE SULFATE 4 MG/ML
4 INJECTION, SOLUTION INTRAMUSCULAR; INTRAVENOUS
Status: COMPLETED | OUTPATIENT
Start: 2019-03-15 | End: 2019-03-15

## 2019-03-15 RX ORDER — HYDRALAZINE HYDROCHLORIDE 20 MG/ML
10 INJECTION INTRAMUSCULAR; INTRAVENOUS
Status: COMPLETED | OUTPATIENT
Start: 2019-03-15 | End: 2019-03-15

## 2019-03-15 RX ORDER — CARVEDILOL 12.5 MG/1
12.5 TABLET ORAL
Status: COMPLETED | OUTPATIENT
Start: 2019-03-15 | End: 2019-03-15

## 2019-03-15 RX ORDER — MECLIZINE HCL 12.5 MG 12.5 MG/1
25 TABLET ORAL
Status: COMPLETED | OUTPATIENT
Start: 2019-03-15 | End: 2019-03-15

## 2019-03-15 RX ORDER — MORPHINE SULFATE 4 MG/ML
8 INJECTION, SOLUTION INTRAMUSCULAR; INTRAVENOUS
Status: COMPLETED | OUTPATIENT
Start: 2019-03-15 | End: 2019-03-15

## 2019-03-15 RX ORDER — NIFEDIPINE 30 MG/1
30 TABLET, EXTENDED RELEASE ORAL
Status: COMPLETED | OUTPATIENT
Start: 2019-03-15 | End: 2019-03-15

## 2019-03-15 RX ADMIN — MORPHINE SULFATE 4 MG: 4 INJECTION INTRAVENOUS at 07:03

## 2019-03-15 RX ADMIN — NIFEDIPINE 30 MG: 30 TABLET, FILM COATED, EXTENDED RELEASE ORAL at 09:03

## 2019-03-15 RX ADMIN — POTASSIUM CHLORIDE 40 MEQ: 20 SOLUTION ORAL at 08:03

## 2019-03-15 RX ADMIN — CARVEDILOL 12.5 MG: 12.5 TABLET, FILM COATED ORAL at 07:03

## 2019-03-15 RX ADMIN — SODIUM CHLORIDE 500 ML: 0.9 INJECTION, SOLUTION INTRAVENOUS at 07:03

## 2019-03-15 RX ADMIN — MORPHINE SULFATE 8 MG: 4 INJECTION INTRAVENOUS at 07:03

## 2019-03-15 RX ADMIN — HYDRALAZINE HYDROCHLORIDE 10 MG: 20 INJECTION INTRAMUSCULAR; INTRAVENOUS at 11:03

## 2019-03-15 RX ADMIN — MECLIZINE 25 MG: 12.5 TABLET ORAL at 07:03

## 2019-03-15 RX ADMIN — MORPHINE SULFATE 8 MG: 4 INJECTION INTRAVENOUS at 11:03

## 2019-03-15 NOTE — ED NOTES
LOC: The patient is awake, alert and aware of environment with an appropriate affect, the patient is oriented x 3 and speaking appropriately.  APPEARANCE: Patient resting comfortably and in no acute distress, patient is clean and well groomed, patient's clothing is properly fastened.  SKIN: The skin is warm and dry, color consistent with ethnicity, patient has normal skin turgor and moist mucus membranes, skin intact, no breakdown or bruising noted.  MUSCULOSKELETAL: Patient moving all extremities spontaneously, no obvious swelling or deformities noted.  RESPIRATORY: Airway is open and patent, respirations are spontaneous, patient has a normal effort and rate, no accessory muscle use noted  NEUROLOGIC:  facial expression is symmetrical, patient moving all extremities spontaneously, normal sensation in all extremities when touched with a finger.  Follows all commands appropriately.    Patient states at 330 this afternoon, patient had a sudden pain that started on his left side middle/lower back pain and it radiates to the front of his body and left abd, patient rates pain 20/10 pain scale, patient had a kidney transplant on the right side two years ago, no other complains at this time, will continue to monitor

## 2019-03-15 NOTE — ED PROVIDER NOTES
Encounter Date: 3/15/2019    SCRIBE #1 NOTE: I, Harjit Quezada, am scribing for, and in the presence of,  Ashly Shipley MD. I have scribed the following portions of the note - the EKG reading. Other sections scribed: HPI, ROS, PE, MDM.       History     Chief Complaint   Patient presents with    Flank Pain     L flank pain and coming to front, kidney xplant in 2016,  hx pancratitis    Kidney Transplant     The patient is a 63 y.o. male with co-morbidities including hx of vertigo, DM type 2, HTN, hx of pancreatitis, neuropathy, hx of organ transplant right-sided kidney, HLD, CKD, and hx of hep C who presents to the ED with a complaint of left-sided flank pain today. Pt reports sudden left-sided flank pain that radiates to the LLQ of his abdomen, stating that flank pain is unchanged with movement. He further endorses to vertigo when turning to the right side, but denies previous episodes of flank pain, nausea, vomiting, abnormal bowel movements, chest pain, SOB, fever, blood in urine, rashes, burning with urination, appetite change, or any other complaints at this time. Pt reports his blood pressure has been running in the 150s, and generally takes nifedipine prn when blood pressure is over 140. He states he has been compliant with his transplant medication, but is uncertain whether or not he took his nifedipine this morning.      The history is provided by the patient.     Review of patient's allergies indicates:  No Known Allergies  Past Medical History:   Diagnosis Date    Anxiety 7/29/2014    CKD (chronic kidney disease) stage 2, GFR 60-89 ml/min 12/28/2016    CKD (chronic kidney disease) stage 4, GFR 15-29 ml/min 7/29/2014    Depression - situational 7/29/2014    Diabetes mellitus     Diabetes type 2 since 2000 7/29/2014    Diabetic neuropathy 7/29/2014    History of hepatitis C, s/p successful Rx w/ SVR24 - 9/2017 7/29/2014    Genotype 1a, treatment naive 10/2014 liver biopsy - grade 1 / stage 1  Completed Harvoni w/ SVR    Hyperlipidemia 2014    Hypertension     Neuropathy     Organ transplant candidate 2014    Pancreatitis      Past Surgical History:   Procedure Laterality Date    APPENDECTOMY      BIOPSY LIVER AND ULTRASOUND N/A 10/3/2014    Performed by [facility] Provider at [facility] OR 2ND FLR    COLONOSCOPY N/A 2014    Performed by Montez Saunders MD at [facility] ENDO (4TH FLR)    KIDNEY TRANSPLANT      REMOVAL-CATHETER-PERITONEAL DIALYSIS  2016    Performed by Constantino Gillette MD at [facility] OR 2ND FLR    TRANSPLANT-KIDNEY N/A 2016    Performed by Constantino Gillette MD at [facility] OR 2ND FLR     Family History   Problem Relation Age of Onset    Diabetes Mother     Hypertension Mother     Heart disease Mother         CAD with PCI    Heart disease Father     Cancer Brother         one sister with breast cancer    Hypertension Brother         one sister with HTN and borderline DM    Stroke Neg Hx     Kidney disease Neg Hx      Social History     Tobacco Use    Smoking status: Former Smoker     Packs/day: 0.50     Years: 32.00     Pack years: 16.00     Types: Cigarettes     Last attempt to quit: 2016     Years since quittin.2    Smokeless tobacco: Never Used    Tobacco comment: Pt reports that he quit in , but started up again in . pt reports he is currently working on quitting again   Substance Use Topics    Alcohol use: Yes     Comment: Pt reports occasional beers on Sundays. Pt reports drinking daily prior to ESRD diagnosis.    Drug use: No     Review of Systems   Constitutional: Negative for appetite change and fever.   HENT: Negative for sore throat.    Eyes: Negative for visual disturbance.   Respiratory: Negative for shortness of breath.    Cardiovascular: Negative for chest pain.   Gastrointestinal: Negative for nausea and vomiting.        Negative for abnormal bowel movements.   Genitourinary: Positive for flank pain (left-sided flank pain that  radiates to the LLQ abdomen). Negative for dysuria and hematuria.   Musculoskeletal: Negative for back pain.   Skin: Negative for rash.   Neurological: Positive for dizziness (vertigo when turning to the right side). Negative for weakness.       Physical Exam     Initial Vitals [03/15/19 1710]   BP Pulse Resp Temp SpO2   (!) 203/92 75 18 97.9 °F (36.6 °C) 100 %      MAP       --         Physical Exam    Nursing note and vitals reviewed.  Constitutional: He appears well-developed and well-nourished. No distress.   HENT:   Head: Normocephalic and atraumatic.   Eyes: EOM are normal. Pupils are equal, round, and reactive to light.   No nystagmus   Neck: Normal range of motion. Neck supple.   Cardiovascular: Normal rate, regular rhythm, normal heart sounds and intact distal pulses.   Pulmonary/Chest: Breath sounds normal. No respiratory distress.   Abdominal: Soft. Bowel sounds are normal. He exhibits no distension. There is tenderness (left lateral flank and mild TTP over LLQ). There is no rebound and no guarding.   No TTP over RLQ transplant kidney.   Musculoskeletal: Normal range of motion. He exhibits no edema or tenderness.   Neurological: He is alert and oriented to person, place, and time. He has normal strength. GCS score is 15. GCS eye subscore is 4. GCS verbal subscore is 5. GCS motor subscore is 6.   Skin: Skin is warm and dry. Capillary refill takes less than 2 seconds.         ED Course   Procedures  Labs Reviewed   CBC W/ AUTO DIFFERENTIAL   COMPREHENSIVE METABOLIC PANEL   URINALYSIS, REFLEX TO URINE CULTURE   LACTIC ACID, PLASMA   PROTIME-INR   LIPASE   POCT GLUCOSE MONITORING CONTINUOUS     EKG Readings: (Independently Interpreted)   Heart Rate: 66.   Sinus with PACs, no signs of acute ischemia.        Imaging Results    None          Medical Decision Making:   History:   Old Medical Records: I decided to obtain old medical records.  Old Records Summarized: records from previous admission(s) and records  from clinic visits.  Initial Assessment:   64 y/o M presents with sudden Left sided flank pain  Ddx: nephrolithiasis, musculoskeletal pain, less likely pancreatitis, diverticulitis, herpes zoster, vascular abnlty  Bilateral equal pulses and not 'back pain' do not suspect aortic dissection. Not on anti-coagulants do not suspect retroperitoneal bleed- will start with CT abd no contrast given kidney tx patient.  Pt notes he has had vertigo before and hs mild sx that are similar to vertigo in the past. No HA. No weakness or unsteady gait  Routine blood work sent, UA and CTabd no contrast  Hydrate 1L saline  Meclizine PO  Analgesia and reassess BP  Independently Interpreted Test(s):   I have ordered and independently interpreted EKG Reading(s) - see prior notes  Clinical Tests:   Lab Tests: Ordered and Reviewed  Radiological Study: Ordered and Reviewed  Medical Tests: Ordered and Reviewed  ED Management:  7:53 PM  Potassium low. Repleted. Pt reports relief of pain after morphine. Awaiting CT.     9:20 PM  Persistently hypertensive. Pt states he does sometimes take nifedipine in the evening. Will order evening dose.  Lactate elevated- repeat ordered after IVF hydration    11:05 PM  Repeat lactate decreased to 1.5. Pt persistently hypertensive despite home meds. Hydralazine IV ordered. Will admit for BP control and further eval of flank pain. Pt signed out at shift change.            Scribe Attestation:   Scribe #1: I performed the above scribed service and the documentation accurately describes the services I performed. I attest to the accuracy of the note.               Clinical Impression:       ICD-10-CM ICD-9-CM   1. Flank pain R10.9 789.09   2. Hypertension, unspecified type I10 401.9   3. Hypertensive urgency I16.0 401.9                                Ashly Shipley MD  03/15/19 [id number]

## 2019-03-15 NOTE — ED NOTES
Telemetry Verification   Patient placed on Telemetry Box and linked to monitor in nurse's station pt in intake 4 at this time.   Box # [id number]   Monitor Tech    Rate    Rhythm

## 2019-03-16 VITALS
TEMPERATURE: 98 F | HEIGHT: 73 IN | SYSTOLIC BLOOD PRESSURE: 158 MMHG | WEIGHT: 199.94 LBS | DIASTOLIC BLOOD PRESSURE: 76 MMHG | RESPIRATION RATE: 18 BRPM | OXYGEN SATURATION: 98 % | BODY MASS INDEX: 26.5 KG/M2 | HEART RATE: 85 BPM

## 2019-03-16 PROBLEM — R10.32 LEFT LOWER QUADRANT PAIN: Status: ACTIVE | Noted: 2019-03-16

## 2019-03-16 PROBLEM — R10.13 EPIGASTRIC PAIN: Status: ACTIVE | Noted: 2019-03-16

## 2019-03-16 LAB
ALBUMIN SERPL BCP-MCNC: 3.7 G/DL
ALP SERPL-CCNC: 80 U/L
ALT SERPL W/O P-5'-P-CCNC: 7 U/L
ANION GAP SERPL CALC-SCNC: 13 MMOL/L
AST SERPL-CCNC: 14 U/L
BASOPHILS # BLD AUTO: 0.02 K/UL
BASOPHILS NFR BLD: 0.2 %
BILIRUB SERPL-MCNC: 0.5 MG/DL
BUN SERPL-MCNC: 11 MG/DL
CALCIUM SERPL-MCNC: 10 MG/DL
CHLORIDE SERPL-SCNC: 103 MMOL/L
CK SERPL-CCNC: 56 U/L
CO2 SERPL-SCNC: 27 MMOL/L
CREAT SERPL-MCNC: 1 MG/DL
DIFFERENTIAL METHOD: ABNORMAL
EOSINOPHIL # BLD AUTO: 0 K/UL
EOSINOPHIL NFR BLD: 0.1 %
ERYTHROCYTE [DISTWIDTH] IN BLOOD BY AUTOMATED COUNT: 17.2 %
EST. GFR  (AFRICAN AMERICAN): >60 ML/MIN/1.73 M^2
EST. GFR  (NON AFRICAN AMERICAN): >60 ML/MIN/1.73 M^2
GLUCOSE SERPL-MCNC: 269 MG/DL
HCT VFR BLD AUTO: 46.6 %
HGB BLD-MCNC: 13.6 G/DL
IMM GRANULOCYTES # BLD AUTO: 0.02 K/UL
IMM GRANULOCYTES NFR BLD AUTO: 0.2 %
LIPASE SERPL-CCNC: 8 U/L
LYMPHOCYTES # BLD AUTO: 1.6 K/UL
LYMPHOCYTES NFR BLD: 16.2 %
MAGNESIUM SERPL-MCNC: 1.4 MG/DL
MCH RBC QN AUTO: 23.6 PG
MCHC RBC AUTO-ENTMCNC: 29.2 G/DL
MCV RBC AUTO: 81 FL
MONOCYTES # BLD AUTO: 1.3 K/UL
MONOCYTES NFR BLD: 13.7 %
NEUTROPHILS # BLD AUTO: 6.7 K/UL
NEUTROPHILS NFR BLD: 69.6 %
NRBC BLD-RTO: 0 /100 WBC
PLATELET # BLD AUTO: 127 K/UL
PMV BLD AUTO: ABNORMAL FL
POCT GLUCOSE: 281 MG/DL (ref 70–110)
POCT GLUCOSE: 289 MG/DL (ref 70–110)
POTASSIUM SERPL-SCNC: 4.1 MMOL/L
PROT SERPL-MCNC: 7.5 G/DL
RBC # BLD AUTO: 5.76 M/UL
SODIUM SERPL-SCNC: 143 MMOL/L
TROPONIN I SERPL DL<=0.01 NG/ML-MCNC: 0.04 NG/ML
TROPONIN I SERPL DL<=0.01 NG/ML-MCNC: 0.04 NG/ML
TSH SERPL DL<=0.005 MIU/L-ACNC: 1.14 UIU/ML
WBC # BLD AUTO: 9.63 K/UL

## 2019-03-16 PROCEDURE — 99220 PR INITIAL OBSERVATION CARE,LEVL III: ICD-10-PCS | Mod: ,,, | Performed by: HOSPITALIST

## 2019-03-16 PROCEDURE — 99217 PR OBSERVATION CARE DISCHARGE: CPT | Mod: ,,, | Performed by: NURSE PRACTITIONER

## 2019-03-16 PROCEDURE — 83690 ASSAY OF LIPASE: CPT

## 2019-03-16 PROCEDURE — 25000003 PHARM REV CODE 250: Performed by: HOSPITALIST

## 2019-03-16 PROCEDURE — 63600175 PHARM REV CODE 636 W HCPCS: Performed by: HOSPITALIST

## 2019-03-16 PROCEDURE — 83735 ASSAY OF MAGNESIUM: CPT

## 2019-03-16 PROCEDURE — 99217 PR OBSERVATION CARE DISCHARGE: ICD-10-PCS | Mod: ,,, | Performed by: NURSE PRACTITIONER

## 2019-03-16 PROCEDURE — 84484 ASSAY OF TROPONIN QUANT: CPT | Mod: 91

## 2019-03-16 PROCEDURE — 80053 COMPREHEN METABOLIC PANEL: CPT

## 2019-03-16 PROCEDURE — 85025 COMPLETE CBC W/AUTO DIFF WBC: CPT

## 2019-03-16 PROCEDURE — 36415 COLL VENOUS BLD VENIPUNCTURE: CPT

## 2019-03-16 PROCEDURE — 25000003 PHARM REV CODE 250: Performed by: INTERNAL MEDICINE

## 2019-03-16 PROCEDURE — 99220 PR INITIAL OBSERVATION CARE,LEVL III: CPT | Mod: ,,, | Performed by: HOSPITALIST

## 2019-03-16 PROCEDURE — 25000003 PHARM REV CODE 250: Performed by: NURSE PRACTITIONER

## 2019-03-16 PROCEDURE — 84484 ASSAY OF TROPONIN QUANT: CPT

## 2019-03-16 PROCEDURE — G0378 HOSPITAL OBSERVATION PER HR: HCPCS

## 2019-03-16 PROCEDURE — 82550 ASSAY OF CK (CPK): CPT

## 2019-03-16 PROCEDURE — S5571 INSULIN DISPOS PEN 3 ML: HCPCS | Performed by: HOSPITALIST

## 2019-03-16 RX ORDER — GABAPENTIN 300 MG/1
300 CAPSULE ORAL 3 TIMES DAILY
Status: DISCONTINUED | OUTPATIENT
Start: 2019-03-16 | End: 2019-03-16 | Stop reason: HOSPADM

## 2019-03-16 RX ORDER — MYCOPHENOLATE MOFETIL 250 MG/1
1000 CAPSULE ORAL 2 TIMES DAILY
Status: DISCONTINUED | OUTPATIENT
Start: 2019-03-16 | End: 2019-03-16 | Stop reason: HOSPADM

## 2019-03-16 RX ORDER — CARVEDILOL 12.5 MG/1
12.5 TABLET ORAL ONCE
Status: COMPLETED | OUTPATIENT
Start: 2019-03-16 | End: 2019-03-16

## 2019-03-16 RX ORDER — HYDRALAZINE HYDROCHLORIDE 25 MG/1
25 TABLET, FILM COATED ORAL 2 TIMES DAILY
Qty: 60 TABLET | Refills: 1 | Status: SHIPPED | OUTPATIENT
Start: 2019-03-16 | End: 2019-09-26 | Stop reason: ALTCHOICE

## 2019-03-16 RX ORDER — PREDNISONE 5 MG/1
5 TABLET ORAL DAILY
Qty: 10 TABLET | Refills: 0 | Status: SHIPPED | OUTPATIENT
Start: 2019-03-17 | End: 2019-03-27

## 2019-03-16 RX ORDER — OXYCODONE HYDROCHLORIDE 5 MG/1
5 TABLET ORAL EVERY 6 HOURS PRN
Status: DISCONTINUED | OUTPATIENT
Start: 2019-03-16 | End: 2019-03-16 | Stop reason: HOSPADM

## 2019-03-16 RX ORDER — TACROLIMUS 0.5 MG/1
1 CAPSULE ORAL 2 TIMES DAILY
Status: DISCONTINUED | OUTPATIENT
Start: 2019-03-16 | End: 2019-03-16 | Stop reason: HOSPADM

## 2019-03-16 RX ORDER — ATORVASTATIN CALCIUM 10 MG/1
10 TABLET, FILM COATED ORAL DAILY
Status: DISCONTINUED | OUTPATIENT
Start: 2019-03-16 | End: 2019-03-16 | Stop reason: HOSPADM

## 2019-03-16 RX ORDER — CARVEDILOL 25 MG/1
25 TABLET ORAL 2 TIMES DAILY
Qty: 60 TABLET | Refills: 1 | Status: SHIPPED | OUTPATIENT
Start: 2019-03-16 | End: 2019-06-04

## 2019-03-16 RX ORDER — CARVEDILOL 12.5 MG/1
12.5 TABLET ORAL 2 TIMES DAILY
Status: DISCONTINUED | OUTPATIENT
Start: 2019-03-16 | End: 2019-03-16

## 2019-03-16 RX ORDER — FAMOTIDINE 20 MG/1
20 TABLET, FILM COATED ORAL NIGHTLY
Status: DISCONTINUED | OUTPATIENT
Start: 2019-03-16 | End: 2019-03-16 | Stop reason: HOSPADM

## 2019-03-16 RX ORDER — MORPHINE SULFATE 4 MG/ML
4 INJECTION, SOLUTION INTRAMUSCULAR; INTRAVENOUS EVERY 4 HOURS PRN
Status: DISCONTINUED | OUTPATIENT
Start: 2019-03-16 | End: 2019-03-16 | Stop reason: HOSPADM

## 2019-03-16 RX ORDER — MECLIZINE HYDROCHLORIDE 25 MG/1
25 TABLET ORAL 3 TIMES DAILY PRN
Status: DISCONTINUED | OUTPATIENT
Start: 2019-03-16 | End: 2019-03-16 | Stop reason: HOSPADM

## 2019-03-16 RX ORDER — ACETAMINOPHEN 325 MG/1
650 TABLET ORAL EVERY 6 HOURS PRN
Status: DISCONTINUED | OUTPATIENT
Start: 2019-03-16 | End: 2019-03-16 | Stop reason: HOSPADM

## 2019-03-16 RX ORDER — CARVEDILOL 25 MG/1
25 TABLET ORAL 2 TIMES DAILY
Status: DISCONTINUED | OUTPATIENT
Start: 2019-03-16 | End: 2019-03-16 | Stop reason: HOSPADM

## 2019-03-16 RX ORDER — LABETALOL 200 MG/1
200 TABLET, FILM COATED ORAL EVERY 6 HOURS PRN
Status: DISCONTINUED | OUTPATIENT
Start: 2019-03-16 | End: 2019-03-16 | Stop reason: HOSPADM

## 2019-03-16 RX ORDER — NIFEDIPINE 30 MG/1
30 TABLET, EXTENDED RELEASE ORAL DAILY
Status: DISCONTINUED | OUTPATIENT
Start: 2019-03-16 | End: 2019-03-16 | Stop reason: HOSPADM

## 2019-03-16 RX ORDER — PREDNISONE 5 MG/1
5 TABLET ORAL DAILY
Status: DISCONTINUED | OUTPATIENT
Start: 2019-03-16 | End: 2019-03-16 | Stop reason: HOSPADM

## 2019-03-16 RX ORDER — TAMSULOSIN HYDROCHLORIDE 0.4 MG/1
0.4 CAPSULE ORAL DAILY
Status: DISCONTINUED | OUTPATIENT
Start: 2019-03-16 | End: 2019-03-16 | Stop reason: HOSPADM

## 2019-03-16 RX ORDER — NAPROXEN SODIUM 220 MG/1
81 TABLET, FILM COATED ORAL DAILY
Status: DISCONTINUED | OUTPATIENT
Start: 2019-03-16 | End: 2019-03-16 | Stop reason: HOSPADM

## 2019-03-16 RX ORDER — HYDRALAZINE HYDROCHLORIDE 25 MG/1
25 TABLET, FILM COATED ORAL 2 TIMES DAILY
Status: DISCONTINUED | OUTPATIENT
Start: 2019-03-16 | End: 2019-03-16 | Stop reason: HOSPADM

## 2019-03-16 RX ORDER — LANOLIN ALCOHOL/MO/W.PET/CERES
400 CREAM (GRAM) TOPICAL ONCE
Status: COMPLETED | OUTPATIENT
Start: 2019-03-16 | End: 2019-03-16

## 2019-03-16 RX ORDER — INSULIN ASPART 100 [IU]/ML
5 INJECTION, SOLUTION INTRAVENOUS; SUBCUTANEOUS
Status: DISCONTINUED | OUTPATIENT
Start: 2019-03-16 | End: 2019-03-16 | Stop reason: HOSPADM

## 2019-03-16 RX ADMIN — OXYCODONE HYDROCHLORIDE 5 MG: 5 TABLET ORAL at 12:03

## 2019-03-16 RX ADMIN — FAMOTIDINE 20 MG: 20 TABLET, FILM COATED ORAL at 03:03

## 2019-03-16 RX ADMIN — THERA TABS 1 TABLET: TAB at 12:03

## 2019-03-16 RX ADMIN — MAGNESIUM OXIDE TAB 400 MG (241.3 MG ELEMENTAL MG) 400 MG: 400 (241.3 MG) TAB at 12:03

## 2019-03-16 RX ADMIN — MYCOPHENOLATE MOFETIL 1000 MG: 250 CAPSULE ORAL at 12:03

## 2019-03-16 RX ADMIN — ATORVASTATIN CALCIUM 10 MG: 10 TABLET, FILM COATED ORAL at 12:03

## 2019-03-16 RX ADMIN — NIFEDIPINE 30 MG: 30 TABLET, FILM COATED, EXTENDED RELEASE ORAL at 12:03

## 2019-03-16 RX ADMIN — ASPIRIN 81 MG CHEWABLE TABLET 81 MG: 81 TABLET CHEWABLE at 12:03

## 2019-03-16 RX ADMIN — PREDNISONE 5 MG: 5 TABLET ORAL at 12:03

## 2019-03-16 RX ADMIN — TAMSULOSIN HYDROCHLORIDE 0.4 MG: 0.4 CAPSULE ORAL at 12:03

## 2019-03-16 RX ADMIN — HYDRALAZINE HYDROCHLORIDE 25 MG: 25 TABLET, FILM COATED ORAL at 12:03

## 2019-03-16 RX ADMIN — GABAPENTIN 300 MG: 300 CAPSULE ORAL at 12:03

## 2019-03-16 RX ADMIN — CARVEDILOL 12.5 MG: 12.5 TABLET, FILM COATED ORAL at 12:03

## 2019-03-16 RX ADMIN — INSULIN DETEMIR 20 UNITS: 100 INJECTION, SOLUTION SUBCUTANEOUS at 03:03

## 2019-03-16 RX ADMIN — INSULIN ASPART 5 UNITS: 100 INJECTION, SOLUTION INTRAVENOUS; SUBCUTANEOUS at 12:03

## 2019-03-16 RX ADMIN — TACROLIMUS 1 MG: 0.5 CAPSULE ORAL at 12:03

## 2019-03-16 NOTE — ASSESSMENT & PLAN NOTE
-Unclear etiology, mid left abdomen, non colickly, constant, from back radiating to front  -CT abdomen without stone or clear source, UA clean, no CVA tenderness, possibly HTN vs PUD vs gastritis  -Less likely cause but check trop to r/o cardiac, beta hydroxybutyrate to r/o euglycemic DKA (unlikely w/ nl bicarb though), repeat lipase (r/o delayed elevation)  -US abdomen limited to eval for clots  -followup with better BP control, if worsened by food could consider HIDA to r/o gallbladder pathology as well (though labs do not suggest this and exam does not focalize to RUQ)

## 2019-03-16 NOTE — HOSPITAL COURSE
Patient admitted to hospital medicine for hypertensive urgency. Patient started on Hydralazine 25mg BID and increased coreg to 25mg with good results. Patient flank pain much improved throughout the day. Patient discharged with hydralazine prescription, prednisone, and instructions to follow up with PCP.

## 2019-03-16 NOTE — SUBJECTIVE & OBJECTIVE
Past Medical History:   Diagnosis Date    Anxiety 7/29/2014    CKD (chronic kidney disease) stage 2, GFR 60-89 ml/min 12/28/2016    CKD (chronic kidney disease) stage 4, GFR 15-29 ml/min 7/29/2014    Depression - situational 7/29/2014    Diabetes mellitus     Diabetes type 2 since 2000 7/29/2014    Diabetic neuropathy 7/29/2014    History of hepatitis C, s/p successful Rx w/ SVR24 - 9/2017 7/29/2014    Genotype 1a, treatment naive 10/2014 liver biopsy - grade 1 / stage 1 Completed Harvoni w/ SVR    Hyperlipidemia 7/29/2014    Hypertension     Neuropathy     Organ transplant candidate 7/29/2014    Pancreatitis        Past Surgical History:   Procedure Laterality Date    APPENDECTOMY      BIOPSY LIVER AND ULTRASOUND N/A 10/3/2014    Performed by [facility] Provider at [facility] OR 2ND FLR    COLONOSCOPY N/A 9/16/2014    Performed by Montez Saunders MD at [facility] ENDO (4TH FLR)    KIDNEY TRANSPLANT      REMOVAL-CATHETER-PERITONEAL DIALYSIS  11/5/2016    Performed by Constantino Gillette MD at [facility] OR 2ND FLR    TRANSPLANT-KIDNEY N/A 11/5/2016    Performed by Constantino Gillette MD at [facility] OR 2ND FLR       Review of patient's allergies indicates:  No Known Allergies    No current facility-administered medications on file prior to encounter.      Current Outpatient Medications on File Prior to Encounter   Medication Sig    atorvastatin (LIPITOR) 10 MG tablet Take 1 tablet (10 mg total) by mouth once daily.    carvedilol (COREG) 12.5 MG tablet TAKE 1 TABLET BY MOUTH TWICE DAILY    cholecalciferol, vitamin D3, 1,000 unit capsule Take 1 capsule (1,000 Units total) by mouth once daily.    dulaglutide (TRULICITY) 0.75 mg/0.5 mL PnIj INJECT 0.5 MLS (0.75 MG TOTAL) INTO THE SKIN EVERY 7 DAYS.    insulin detemir U-100 (LEVEMIR FLEXTOUCH) 100 unit/mL (3 mL) SubQ InPn pen Inject 35 units into the skin at night.    insulin lispro (HUMALOG KWIKPEN INSULIN) 100 unit/mL pen Inject 18 units w/ breakfast, 16 units w/ lunch  "and dinner plus scale 150-200 +2, 201-250 +4, 251-300 +6, 301-350 +8.    melatonin 5 mg Tab Take 1 tablet (5 mg total) by mouth every evening.    mycophenolate (CELLCEPT) 250 mg Cap Take 4 capsules (1,000 mg total) by mouth 2 (two) times daily.    NIFEdipine (PROCARDIA-XL) 30 MG (OSM) 24 hr tablet Take 1 tablet (30 mg total) by mouth once daily.    predniSONE (DELTASONE) 5 MG tablet Take 1 tablet (5 mg total) by mouth once daily.    tacrolimus (PROGRAF) 0.5 MG Cap Take 1mg (2 capsules)  orally in the am and 1 mg (2 capsule) orally in the pm    tamsulosin (FLOMAX) 0.4 mg Cap TAKE ONE CAPSULE BY MOUTH DAILY AFTER DINNER    aspirin 81 MG Chew Take 81 mg by mouth once daily.    blood sugar diagnostic Strp 1 strip by Misc.(Non-Drug; Combo Route) route 4 (four) times daily before meals and nightly. e11.65    blood sugar diagnostic Strp Test blood sugar four times daily    blood-glucose meter (FREESTYLE SYSTEM KIT) kit Use as instructed e11.65 may substitute meter as covered by insurance    famotidine (PEPCID) 20 MG tablet Take 1 tablet (20 mg total) by mouth every evening.    gabapentin (NEURONTIN) 300 MG capsule Take 1 capsule (300 mg total) by mouth 3 (three) times daily.    lancets Misc 1 each by Misc.(Non-Drug; Combo Route) route 4 (four) times daily before meals and nightly. e11.65    meclizine (ANTIVERT) 25 mg tablet Take 1 tablet (25 mg total) by mouth 3 (three) times daily as needed for Dizziness.    multivitamin (THERAGRAN) tablet Take 1 tablet by mouth once daily.    pen needle, diabetic 32 gauge x 5/32" Ndle USE TO ADMINISTER INSULIN 4 TIMES DAILY.     Family History     Problem Relation (Age of Onset)    Cancer Brother    Diabetes Mother    Heart disease Mother, Father    Hypertension Mother, Brother        Tobacco Use    Smoking status: Former Smoker     Packs/day: 0.50     Years: 32.00     Pack years: 16.00     Types: Cigarettes     Last attempt to quit: 11/28/2016     Years since quitting: " 2.2    Smokeless tobacco: Never Used    Tobacco comment: Pt reports that he quit in 2013, but started up again in 2015. pt reports he is currently working on quitting again   Substance and Sexual Activity    Alcohol use: Yes     Comment: Pt reports occasional beers on Sundays. Pt reports drinking daily prior to ESRD diagnosis.    Drug use: No    Sexual activity: Yes     Partners: Female     Review of Systems   Constitutional: Negative for activity change, chills, fatigue and fever.   HENT: Negative for congestion and rhinorrhea.    Eyes: Negative for discharge and redness.   Respiratory: Negative for cough and shortness of breath.    Cardiovascular: Negative for chest pain and palpitations.   Gastrointestinal: Positive for abdominal pain. Negative for diarrhea, nausea and vomiting.   Endocrine: Negative for cold intolerance and heat intolerance.   Genitourinary: Negative for difficulty urinating, dysuria, frequency and urgency.   Musculoskeletal: Negative for arthralgias and myalgias.   Skin: Negative for rash and wound.   Neurological: Negative for dizziness and headaches.   Psychiatric/Behavioral: Negative for agitation and confusion.     Objective:     Vital Signs (Most Recent):  Temp: 98.3 °F (36.8 °C) (03/15/19 2312)  Pulse: 74 (03/16/19 0014)  Resp: 18 (03/15/19 1710)  BP: (!) 169/80 (03/16/19 0014)  SpO2: 99 % (03/16/19 0014) Vital Signs (24h Range):  Temp:  [97.9 °F (36.6 °C)-98.3 °F (36.8 °C)] 98.3 °F (36.8 °C)  Pulse:  [64-86] 74  Resp:  [18] 18  SpO2:  [96 %-100 %] 99 %  BP: (169-226)/() 169/80     Weight: 90.7 kg (200 lb)  Body mass index is 26.39 kg/m².    Physical Exam   Constitutional: He is oriented to person, place, and time. He appears well-developed and well-nourished. No distress.   HENT:   Head: Normocephalic and atraumatic.   Eyes: EOM are normal. Pupils are equal, round, and reactive to light. Right eye exhibits no discharge. Left eye exhibits no discharge.   Neck: Normal range of  motion. Neck supple.   Cardiovascular: Regular rhythm, normal heart sounds and intact distal pulses.   No murmur heard.  Pulmonary/Chest: Effort normal. No respiratory distress. He has no wheezes. He has no rales.   Abdominal: Soft. Bowel sounds are normal. He exhibits no distension. There is tenderness (tender to deep palpation only).   Negative Branch's sign   Musculoskeletal: He exhibits no edema or tenderness.   Neurological: He is alert and oriented to person, place, and time. No cranial nerve deficit.   Skin: Skin is warm and dry. Capillary refill takes less than 2 seconds. No rash noted. He is not diaphoretic. No erythema.   Psychiatric: He has a normal mood and affect. His behavior is normal. Judgment and thought content normal.         CRANIAL NERVES     CN III, IV, VI   Pupils are equal, round, and reactive to light.  Extraocular motions are normal.        Significant Labs:   CBC:   Recent Labs   Lab 03/15/19  1837   WBC 9.18   HGB 13.3*   HCT 43.1   *     CMP:   Recent Labs   Lab 03/15/19  1837      K 3.1*      CO2 23   *   BUN 14   CREATININE 1.0   CALCIUM 9.6   PROT 7.5   ALBUMIN 3.8   BILITOT 0.4   ALKPHOS 81   AST 16   ALT 9*   ANIONGAP 13   EGFRNONAA >60.0       Significant Imaging: I have reviewed all pertinent imaging results/findings within the past 24 hours.

## 2019-03-16 NOTE — PROVIDER PROGRESS NOTES - EMERGENCY DEPT.
"Encounter Date: 3/15/2019    ED Physician Progress Notes        Physician Note:   Received s/o from Dr. Shipley:  63M with PMH kidney txp (11/2016), HTN, HLD, Hep C, presenting with sudden-onset left flank pain radiating to LLQ, also reporting mild "vertigo" (exacerbated by turning head to right, prior) but denying CP, SOB, abd pain, N/V, changes in urination or stooling, no trauma, no HA or neuro changes. On exam mild left lateral TTP and no TTP in LUQ or CVA. Endorses compliance with anti-HTNs but unsure if. ED w/u included labs and CT noncontrast, unremarkable aside from slight left-shift w/o leukocytosis and elevated lactate to 3.3, improved to 1.5 s/p 1L NS.  CT a/p showed no source for left flank pain, pt given home anti-HTN meds and morphine for pain, and reports improvement in pain but remains HTN to 202/95. Will be given hydralazine 10 mg . Per review of records from last note in Oct 2018 showed BP 180s/90s, will decrease to 20% of presenting BP (/105) and admit to floor.     11:30 PM  BP now 187/94, pt admitted to Obs by Dr. Shipley     "

## 2019-03-16 NOTE — ED NOTES
Pt received from [facility].  Pt resting comfortably in the bed.  Dr. Shipley notified re: pt's bp still being elevated.  She is placing new orders.

## 2019-03-16 NOTE — PROGRESS NOTES
Written and verbal discharge instructions given with teach back. IV discontinued with catheter intact, hemostasis obtained. visi equipment and I-pad removed from room. Patient elected to ambulate to auto with all personal belongings. Spouse at side.

## 2019-03-16 NOTE — ED NOTES
Pt awake and alert; resting quietly on stretcher.  Pt placed on continuous pulse ox monitoring with non-invasive blood pressure to cycle every 30 minutes. Pt remains urgnetly hypertensive. Pt significant relief in pain since receiving medication; currently rating pain as a 4/10. No acute distress or additional discomfort reported or observed at this time.  Pt denies restroom needs at this time; urinal remains at bedside.  Pt is able to reposition self on stretcher. Bed locked in lowest position; side rails up and locked x 2; call light, bedside table, and personal belongings within reach. Room assessed for safety measures and cleanliness; no action needed at this time. Plan of care discussed.  Pt instructed to alert nurse for assistance and before attempting to get out of bed; verbalizes understanding. Pt denies needs or complaints at this time. Spouse remains at bedside; will continue to monitor.

## 2019-03-16 NOTE — ASSESSMENT & PLAN NOTE
-Possibly source of pain though will look for other sources per abd pain  -No clear signs of organ dysfunction, will aim for target SBP < 180 tonight then to normal over next 48 hours to avoid to much of a drop  -Restarted home coreg, nifedipine  -PRN labetalol po

## 2019-03-16 NOTE — H&P
[facility] Hospital Medicine  History & Physical    Patient Name: Ravi Zamorano  MRN: [id number]  Admission Date: 3/15/2019  Attending Physician: No att. providers found   Primary Care Provider: Mima Mack MD    [facility] Medicine Team: [facility] F Nolan Park IV, MD     Patient information was obtained from patient, past medical records and ER records.     Subjective:     Principal Problem:Hypertensive urgency    Chief Complaint:   Chief Complaint   Patient presents with    Flank Pain     L flank pain and coming to front, kidney xplant in 2016,  hx pancratitis    Kidney Transplant        HPI: 64 y/o with ESRD 2/2 HTN/DMII s/p peritoneal HD s/p KTx kidney txp (11/2016) on maintenance cellcept/tacro/pred who presents with chief complaint of abdominal pain. Reports acute onset left sided sharp, constant abdominal pain beginning at about 3pm today. Pain described as initiating on lateral left back and radiating to anterior left abdomen. Denies any fevers, chills, sweats, chest pain, nausea, vomiting, diarrhea. He was watching TV when it started. Has not eaten since and last meal was at noon. Water consumed since does not change the pain. Of note h/o pancreatitis several years ago and at that time pain was right sided per patient and different. Reports chronic vertigo but no change in current from baseline.     He was found to be HTN in ED to SBP > 200s, CT abdomen w/o contrast did not reveal any acute pathology, UA without blood but some glucose and protein. He reports most resolution of pain with opioids in ED but still a small amount present.         Past Medical History:   Diagnosis Date    Anxiety 7/29/2014    CKD (chronic kidney disease) stage 2, GFR 60-89 ml/min 12/28/2016    CKD (chronic kidney disease) stage 4, GFR 15-29 ml/min 7/29/2014    Depression - situational 7/29/2014    Diabetes mellitus     Diabetes type 2 since 2000 7/29/2014    Diabetic neuropathy 7/29/2014     History of hepatitis C, s/p successful Rx w/ SVR24 - 9/2017 7/29/2014    Genotype 1a, treatment naive 10/2014 liver biopsy - grade 1 / stage 1 Completed Harvoni w/ SVR    Hyperlipidemia 7/29/2014    Hypertension     Neuropathy     Organ transplant candidate 7/29/2014    Pancreatitis        Past Surgical History:   Procedure Laterality Date    APPENDECTOMY      BIOPSY LIVER AND ULTRASOUND N/A 10/3/2014    Performed by [facility] Diagnostic Provider at [facility] OR 2ND FLR    COLONOSCOPY N/A 9/16/2014    Performed by Montez Saunders MD at [facility] ENDO (4TH FLR)    KIDNEY TRANSPLANT      REMOVAL-CATHETER-PERITONEAL DIALYSIS  11/5/2016    Performed by Constantino Gillette MD at [facility] OR 2ND FLR    TRANSPLANT-KIDNEY N/A 11/5/2016    Performed by Constantino Gillette MD at [facility] OR 2ND FLR       Review of patient's allergies indicates:  No Known Allergies    No current facility-administered medications on file prior to encounter.      Current Outpatient Medications on File Prior to Encounter   Medication Sig    atorvastatin (LIPITOR) 10 MG tablet Take 1 tablet (10 mg total) by mouth once daily.    carvedilol (COREG) 12.5 MG tablet TAKE 1 TABLET BY MOUTH TWICE DAILY    cholecalciferol, vitamin D3, 1,000 unit capsule Take 1 capsule (1,000 Units total) by mouth once daily.    dulaglutide (TRULICITY) 0.75 mg/0.5 mL PnIj INJECT 0.5 MLS (0.75 MG TOTAL) INTO THE SKIN EVERY 7 DAYS.    insulin detemir U-100 (LEVEMIR FLEXTOUCH) 100 unit/mL (3 mL) SubQ InPn pen Inject 35 units into the skin at night.    insulin lispro (HUMALOG KWIKPEN INSULIN) 100 unit/mL pen Inject 18 units w/ breakfast, 16 units w/ lunch and dinner plus scale 150-200 +2, 201-250 +4, 251-300 +6, 301-350 +8.    melatonin 5 mg Tab Take 1 tablet (5 mg total) by mouth every evening.    mycophenolate (CELLCEPT) 250 mg Cap Take 4 capsules (1,000 mg total) by mouth 2 (two) times daily.    NIFEdipine (PROCARDIA-XL) 30 MG (OSM) 24 hr tablet Take 1 tablet (30 mg total) by mouth  "once daily.    predniSONE (DELTASONE) 5 MG tablet Take 1 tablet (5 mg total) by mouth once daily.    tacrolimus (PROGRAF) 0.5 MG Cap Take 1mg (2 capsules)  orally in the am and 1 mg (2 capsule) orally in the pm    tamsulosin (FLOMAX) 0.4 mg Cap TAKE ONE CAPSULE BY MOUTH DAILY AFTER DINNER    aspirin 81 MG Chew Take 81 mg by mouth once daily.    blood sugar diagnostic Strp 1 strip by Misc.(Non-Drug; Combo Route) route 4 (four) times daily before meals and nightly. e11.65    blood sugar diagnostic Strp Test blood sugar four times daily    blood-glucose meter (FREESTYLE SYSTEM KIT) kit Use as instructed e11.65 may substitute meter as covered by insurance    famotidine (PEPCID) 20 MG tablet Take 1 tablet (20 mg total) by mouth every evening.    gabapentin (NEURONTIN) 300 MG capsule Take 1 capsule (300 mg total) by mouth 3 (three) times daily.    lancets Misc 1 each by Misc.(Non-Drug; Combo Route) route 4 (four) times daily before meals and nightly. e11.65    meclizine (ANTIVERT) 25 mg tablet Take 1 tablet (25 mg total) by mouth 3 (three) times daily as needed for Dizziness.    multivitamin (THERAGRAN) tablet Take 1 tablet by mouth once daily.    pen needle, diabetic 32 gauge x 5/32" Ndle USE TO ADMINISTER INSULIN 4 TIMES DAILY.     Family History     Problem Relation (Age of Onset)    Cancer Brother    Diabetes Mother    Heart disease Mother, Father    Hypertension Mother, Brother        Tobacco Use    Smoking status: Former Smoker     Packs/day: 0.50     Years: 32.00     Pack years: 16.00     Types: Cigarettes     Last attempt to quit: 2016     Years since quittin.2    Smokeless tobacco: Never Used    Tobacco comment: Pt reports that he quit in , but started up again in . pt reports he is currently working on quitting again   Substance and Sexual Activity    Alcohol use: Yes     Comment: Pt reports occasional beers on Sundays. Pt reports drinking daily prior to ESRD diagnosis.    " Drug use: No    Sexual activity: Yes     Partners: Female     Review of Systems   Constitutional: Negative for activity change, chills, fatigue and fever.   HENT: Negative for congestion and rhinorrhea.    Eyes: Negative for discharge and redness.   Respiratory: Negative for cough and shortness of breath.    Cardiovascular: Negative for chest pain and palpitations.   Gastrointestinal: Positive for abdominal pain. Negative for diarrhea, nausea and vomiting.   Endocrine: Negative for cold intolerance and heat intolerance.   Genitourinary: Negative for difficulty urinating, dysuria, frequency and urgency.   Musculoskeletal: Negative for arthralgias and myalgias.   Skin: Negative for rash and wound.   Neurological: Negative for dizziness and headaches.   Psychiatric/Behavioral: Negative for agitation and confusion.     Objective:     Vital Signs (Most Recent):  Temp: 98.3 °F (36.8 °C) (03/15/19 2312)  Pulse: 74 (03/16/19 0014)  Resp: 18 (03/15/19 1710)  BP: (!) 169/80 (03/16/19 0014)  SpO2: 99 % (03/16/19 0014) Vital Signs (24h Range):  Temp:  [97.9 °F (36.6 °C)-98.3 °F (36.8 °C)] 98.3 °F (36.8 °C)  Pulse:  [64-86] 74  Resp:  [18] 18  SpO2:  [96 %-100 %] 99 %  BP: (169-226)/() 169/80     Weight: 90.7 kg (200 lb)  Body mass index is 26.39 kg/m².    Physical Exam   Constitutional: He is oriented to person, place, and time. He appears well-developed and well-nourished. No distress.   HENT:   Head: Normocephalic and atraumatic.   Eyes: EOM are normal. Pupils are equal, round, and reactive to light. Right eye exhibits no discharge. Left eye exhibits no discharge.   Neck: Normal range of motion. Neck supple.   Cardiovascular: Regular rhythm, normal heart sounds and intact distal pulses.   No murmur heard.  Pulmonary/Chest: Effort normal. No respiratory distress. He has no wheezes. He has no rales.   Abdominal: Soft. Bowel sounds are normal. He exhibits no distension. There is tenderness (tender to deep palpation only).    Negative Branch's sign   Musculoskeletal: He exhibits no edema or tenderness.   Neurological: He is alert and oriented to person, place, and time. No cranial nerve deficit.   Skin: Skin is warm and dry. Capillary refill takes less than 2 seconds. No rash noted. He is not diaphoretic. No erythema.   Psychiatric: He has a normal mood and affect. His behavior is normal. Judgment and thought content normal.         CRANIAL NERVES     CN III, IV, VI   Pupils are equal, round, and reactive to light.  Extraocular motions are normal.        Significant Labs:   CBC:   Recent Labs   Lab 03/15/19  1837   WBC 9.18   HGB 13.3*   HCT 43.1   *     CMP:   Recent Labs   Lab 03/15/19  1837      K 3.1*      CO2 23   *   BUN 14   CREATININE 1.0   CALCIUM 9.6   PROT 7.5   ALBUMIN 3.8   BILITOT 0.4   ALKPHOS 81   AST 16   ALT 9*   ANIONGAP 13   EGFRNONAA >60.0       Significant Imaging: I have reviewed all pertinent imaging results/findings within the past 24 hours.    Assessment/Plan:     * Hypertensive urgency    -Possibly source of pain though will look for other sources per abd pain  -No clear signs of organ dysfunction, will aim for target SBP < 180 tonight then to normal over next 48 hours to avoid to much of a drop  -Restarted home coreg, nifedipine  -PRN labetalol po        Epigastric pain    -Unclear etiology, mid left abdomen, non colickly, constant, from back radiating to front  -CT abdomen without stone or clear source, UA clean, no CVA tenderness, possibly HTN vs PUD vs gastritis  -Less likely cause but check trop to r/o cardiac, beta hydroxybutyrate to r/o euglycemic DKA (unlikely w/ nl bicarb though), repeat lipase (r/o delayed elevation)  -US abdomen limited to eval for clots  -followup with better BP control, if worsened by food could consider HIDA to r/o gallbladder pathology as well (though labs do not suggest this and exam does not focalize to RUQ)       CKD (chronic kidney disease) stage  2, GFR 60-89 ml/min    -Cr at baseline, monitor closely       Chronic diastolic congestive heart failure    -Euvolemic at present       Hyperlipidemia    -f/u CK if nl, restart statin     Hypertension since 2000    -See HTN urgency       Diabetic polyneuropathy associated with type 2 diabetes mellitus    -Continue gabapentin       Type 2 diabetes mellitus with stage 2 chronic kidney disease, with long-term current use of insulin    -Reduce home levemir and aspart, +LDSSI         VTE Risk Mitigation (From admission, onward)        Ordered     Place DARIUS hose  Until discontinued      03/15/19 2325     IP VTE HIGH RISK PATIENT  Once      03/15/19 2325           Nolan Park IV, MD  Department of Hospital Medicine   [facility]

## 2019-03-16 NOTE — HPI
62 y/o with ESRD 2/2 HTN/DMII s/p peritoneal HD s/p KTx kidney txp (11/2016) on maintenance cellcept/tacro/pred who presents with chief complaint of abdominal pain. Reports acute onset left sided sharp, constant abdominal pain beginning at about 3pm today. Pain described as initiating on lateral left back and radiating to anterior left abdomen. Denies any fevers, chills, sweats, chest pain, nausea, vomiting, diarrhea. He was watching TV when it started. Has not eaten since and last meal was at noon. Water consumed since does not change the pain. Of note h/o pancreatitis several years ago and at that time pain was right sided per patient and different. Reports chronic vertigo but no change in current from baseline.     He was found to be HTN in ED to SBP > 200s, CT abdomen w/o contrast did not reveal any acute pathology, UA without blood but some glucose and protein. He reports most resolution of pain with opioids in ED but still a small amount present.

## 2019-03-16 NOTE — DISCHARGE INSTRUCTIONS
Coreg dose increased to 25mg daily and Hydralazine 25mg twice daily added. Please keep blood pressure log and bring into Primary Care office on follow up appointment. Please make follow up appointment with primary care provider in 1-2 weeks. If you feel light-headed, faint, dizzy, check blood pressure. If blood pressure is below 100/60, call primary care provider

## 2019-03-16 NOTE — NURSING
New admit from ED.  Blood pressure elevated.  Patient non-compliant with diet and medications at home. Currently,   resting quietly.  Visi monitoring.

## 2019-03-17 ENCOUNTER — NURSE TRIAGE (OUTPATIENT)
Dept: ADMINISTRATIVE | Facility: CLINIC | Age: 64
End: 2019-03-17

## 2019-03-17 NOTE — ASSESSMENT & PLAN NOTE
-Possibly source of pain though will look for other sources per abd pain  -No clear signs of organ dysfunction, will aim for target SBP < 180 tonight then to normal over next 48 hours to avoid to much of a drop  -Restarted home coreg, nifedipine  -PRN labetalol po   - discharged with hydralazine and instructions to f/u with PCP

## 2019-03-17 NOTE — ASSESSMENT & PLAN NOTE
"-Unclear etiology, mid left abdomen, non colickly, constant, from back radiating to front  -CT abdomen without stone or clear source, UA clean, no CVA tenderness, possibly HTN vs PUD vs gastritis  -Less likely cause but check trop to r/o cardiac, beta hydroxybutyrate to r/o euglycemic DKA (unlikely w/ nl bicarb though), repeat lipase (r/o delayed elevation)  -US abdomen limited to eval for clots-cancelled as patient ate and "didn't want to be NPO for any test"  - pain greatly improved overnight and appears to be musculoskeletal       "

## 2019-03-17 NOTE — TELEPHONE ENCOUNTER
Reason for Disposition   Pain mainly in flank (i.e., in the side, over the lower ribs or just below the ribs)   [1] SEVERE pain (e.g., excruciating, scale 8-10) AND [2] present > 1 hour    Protocols used: BACK PAIN-A-AH, FLANK PAIN-A-AH

## 2019-03-17 NOTE — DISCHARGE SUMMARY
[facility] Hospital Medicine  Discharge Summary      Patient Name: Ravi Zamorano  MRN: [id number]  Admission Date: 3/15/2019  Hospital Length of Stay: 0 days  Discharge Date and Time: 3/16/2019  4:56 PM  Attending Physician: No att. providers found   Discharging Provider: Adrianna Vora NP  Primary Care Provider: Mima Mack MD  [facility] Medicine Team: [facility] [place] [facility] Adrianna Vora NP    HPI:   64 y/o with ESRD 2/2 HTN/DMII s/p peritoneal HD s/p KTx kidney txp (11/2016) on maintenance cellcept/tacro/pred who presents with chief complaint of abdominal pain. Reports acute onset left sided sharp, constant abdominal pain beginning at about 3pm today. Pain described as initiating on lateral left back and radiating to anterior left abdomen. Denies any fevers, chills, sweats, chest pain, nausea, vomiting, diarrhea. He was watching TV when it started. Has not eaten since and last meal was at noon. Water consumed since does not change the pain. Of note h/o pancreatitis several years ago and at that time pain was right sided per patient and different. Reports chronic vertigo but no change in current from baseline.     He was found to be HTN in ED to SBP > 200s, CT abdomen w/o contrast did not reveal any acute pathology, UA without blood but some glucose and protein. He reports most resolution of pain with opioids in ED but still a small amount present.         * No surgery found *      Hospital Course:   Patient admitted to hospital medicine for hypertensive urgency. Patient started on Hydralazine 25mg BID and increased coreg to 25mg with good results. Patient flank pain much improved throughout the day. Patient discharged with hydralazine prescription, prednisone, and instructions to follow up with PCP.      Consults:     * Hypertensive urgency    -Possibly source of pain though will look for other sources per abd pain  -No clear signs of organ dysfunction, will aim for target SBP < 180  "tonight then to normal over next 48 hours to avoid to much of a drop  -Restarted home coreg, nifedipine  -PRN labetalol po   - discharged with hydralazine and instructions to f/u with PCP       Epigastric pain    -Unclear etiology, mid left abdomen, non colickly, constant, from back radiating to front  -CT abdomen without stone or clear source, UA clean, no CVA tenderness, possibly HTN vs PUD vs gastritis  -Less likely cause but check trop to r/o cardiac, beta hydroxybutyrate to r/o euglycemic DKA (unlikely w/ nl bicarb though), repeat lipase (r/o delayed elevation)  -US abdomen limited to eval for clots-cancelled as patient ate and "didn't want to be NPO for any test"  - pain greatly improved overnight and appears to be musculoskeletal          CKD (chronic kidney disease) stage 2, GFR 60-89 ml/min    -Cr at baseline, monitor closely       Chronic diastolic congestive heart failure    -Euvolemic at present       Hyperlipidemia    -f/u CK if nl, restart statin     Hypertension since 2000    -See HTN urgency       Diabetic polyneuropathy associated with type 2 diabetes mellitus    -Continue gabapentin       Type 2 diabetes mellitus with stage 2 chronic kidney disease, with long-term current use of insulin    -Reduce home levemir and aspart, +LDSSI         Final Active Diagnoses:    Diagnosis Date Noted POA    PRINCIPAL PROBLEM:  Hypertensive urgency [I16.0] 03/15/2019 Yes    Epigastric pain [R10.13] 03/16/2019 Yes    CKD (chronic kidney disease) stage 2, GFR 60-89 ml/min [N18.2] 12/28/2016 Yes     Chronic    Anemia of chronic disease [D63.8] 11/07/2016 Yes    Long-term use of immunosuppressant medication [Z79.899] 11/07/2016 Not Applicable    Chronic diastolic congestive heart failure [I50.32] 10/24/2014 Yes    History of hepatitis C, s/p successful Rx w/ SVR24 - 9/2017 [Z86.19] 07/29/2014 Yes    Diabetic polyneuropathy associated with type 2 diabetes mellitus [E11.42] 07/29/2014 Yes    Hyperlipidemia " "[E78.5] 07/29/2014 Yes    Type 2 diabetes mellitus with stage 2 chronic kidney disease, with long-term current use of insulin [E11.22, N18.2, Z79.4] 07/29/2014 Not Applicable    Hypertension since 2000 [I10] 07/29/2014 Yes      Problems Resolved During this Admission:       Discharged Condition: good    Disposition: Home or Self Care    Follow Up:    Patient Instructions:      Notify your health care provider if you experience any of the following:  temperature >100.4     Notify your health care provider if you experience any of the following:  difficulty breathing or increased cough     Notify your health care provider if you experience any of the following:  persistent dizziness, light-headedness, or visual disturbances     Notify your health care provider if you experience any of the following:  increased confusion or weakness     Activity as tolerated       Significant Diagnostic Studies: Labs: All labs within the past 24 hours have been reviewed    Pending Diagnostic Studies:     None         Medications:  Reconciled Home Medications:      Medication List      START taking these medications    hydrALAZINE 25 MG tablet  Commonly known as:  APRESOLINE  Take 1 tablet (25 mg total) by mouth 2 (two) times daily.        CHANGE how you take these medications    carvedilol 25 MG tablet  Commonly known as:  COREG  Take 1 tablet (25 mg total) by mouth 2 (two) times daily.  What changed:    · medication strength  · how much to take  · how to take this  · when to take this        CONTINUE taking these medications    aspirin 81 MG Chew  Take 81 mg by mouth once daily.     atorvastatin 10 MG tablet  Commonly known as:  LIPITOR  Take 1 tablet (10 mg total) by mouth once daily.     BD ULTRA-FINE LO PEN NEEDLE 32 gauge x 5/32" Ndle  Generic drug:  pen needle, diabetic  USE TO ADMINISTER INSULIN 4 TIMES DAILY.     * blood sugar diagnostic Strp  1 strip by Misc.(Non-Drug; Combo Route) route 4 (four) times daily before meals " and nightly. e11.65     * ONETOUCH ULTRA BLUE TEST STRIP Strp  Generic drug:  blood sugar diagnostic  Test blood sugar four times daily     blood-glucose meter kit  Commonly known as:  FREESTYLE SYSTEM KIT  Use as instructed e11.65 may substitute meter as covered by insurance     cholecalciferol (vitamin D3) 1,000 unit capsule  Commonly known as:  VITAMIN D3  Take 1 capsule (1,000 Units total) by mouth once daily.     famotidine 20 MG tablet  Commonly known as:  PEPCID  Take 1 tablet (20 mg total) by mouth every evening.     gabapentin 300 MG capsule  Commonly known as:  NEURONTIN  Take 1 capsule (300 mg total) by mouth 3 (three) times daily.     HumaLOG KwikPen Insulin 100 unit/mL pen  Generic drug:  insulin lispro  Inject 18 units w/ breakfast, 16 units w/ lunch and dinner plus scale 150-200 +2, 201-250 +4, 251-300 +6, 301-350 +8.     lancets Misc  1 each by Misc.(Non-Drug; Combo Route) route 4 (four) times daily before meals and nightly. e11.65     LEVEMIR FLEXTOUCH U-100 INSULN 100 unit/mL (3 mL) Inpn pen  Generic drug:  insulin detemir U-100  Inject 35 units into the skin at night.     meclizine 25 mg tablet  Commonly known as:  ANTIVERT  Take 1 tablet (25 mg total) by mouth 3 (three) times daily as needed for Dizziness.     melatonin 5 mg Tab  Take 1 tablet (5 mg total) by mouth every evening.     multivitamin tablet  Commonly known as:  THERAGRAN  Take 1 tablet by mouth once daily.     mycophenolate 250 mg Cap  Commonly known as:  CELLCEPT  Take 4 capsules (1,000 mg total) by mouth 2 (two) times daily.     NIFEdipine 30 MG (OSM) 24 hr tablet  Commonly known as:  PROCARDIA-XL  Take 1 tablet (30 mg total) by mouth once daily.     predniSONE 5 MG tablet  Commonly known as:  DELTASONE  Take 1 tablet (5 mg total) by mouth once daily. for 10 days     tacrolimus 0.5 MG Cap  Commonly known as:  PROGRAF  Take 1mg (2 capsules)  orally in the am and 1 mg (2 capsule) orally in the pm     tamsulosin 0.4 mg Cap  Commonly  known as:  FLOMAX  TAKE ONE CAPSULE BY MOUTH DAILY AFTER DINNER     TRULICITY 0.75 mg/0.5 mL Pnij  Generic drug:  dulaglutide  INJECT 0.5 MLS (0.75 MG TOTAL) INTO THE SKIN EVERY 7 DAYS.         * This list has 2 medication(s) that are the same as other medications prescribed for you. Read the directions carefully, and ask your doctor or other care provider to review them with you.                Indwelling Lines/Drains at time of discharge:   Lines/Drains/Airways     Drain                 Closed/Suction Drain 11/05/16 2021 Right Abdomen Bulb 15 Fr. 861 days          Arterial Line                 Arterial Line 11/05/16 1910 Right 861 days                Time spent on the discharge of patient: >35 minutes  Patient was seen and examined on the date of discharge and determined to be suitable for discharge.         Adrianna Vora NP  Department of Hospital Medicine  [facility]

## 2019-03-27 DIAGNOSIS — E11.42 TYPE 2 DIABETES MELLITUS WITH POLYNEUROPATHY: ICD-10-CM

## 2019-04-05 ENCOUNTER — OFFICE VISIT (OUTPATIENT)
Dept: NEPHROLOGY | Facility: CLINIC | Age: 64
End: 2019-04-05
Payer: COMMERCIAL

## 2019-04-05 VITALS
SYSTOLIC BLOOD PRESSURE: 160 MMHG | HEIGHT: 73 IN | DIASTOLIC BLOOD PRESSURE: 80 MMHG | OXYGEN SATURATION: 98 % | BODY MASS INDEX: 26.27 KG/M2 | WEIGHT: 198.19 LBS | HEART RATE: 87 BPM

## 2019-04-05 DIAGNOSIS — Z94.0 DECEASED-DONOR KIDNEY TRANSPLANT RECIPIENT: ICD-10-CM

## 2019-04-05 DIAGNOSIS — N18.2 CHRONIC KIDNEY DISEASE, STAGE II (MILD): Primary | ICD-10-CM

## 2019-04-05 PROCEDURE — 3079F DIAST BP 80-89 MM HG: CPT | Mod: CPTII,S$GLB,, | Performed by: INTERNAL MEDICINE

## 2019-04-05 PROCEDURE — 99214 OFFICE O/P EST MOD 30 MIN: CPT | Mod: S$GLB,,, | Performed by: INTERNAL MEDICINE

## 2019-04-05 PROCEDURE — 3077F PR MOST RECENT SYSTOLIC BLOOD PRESSURE >= 140 MM HG: ICD-10-PCS | Mod: CPTII,S$GLB,, | Performed by: INTERNAL MEDICINE

## 2019-04-05 PROCEDURE — 3008F BODY MASS INDEX DOCD: CPT | Mod: CPTII,S$GLB,, | Performed by: INTERNAL MEDICINE

## 2019-04-05 PROCEDURE — 3077F SYST BP >= 140 MM HG: CPT | Mod: CPTII,S$GLB,, | Performed by: INTERNAL MEDICINE

## 2019-04-05 PROCEDURE — 99214 PR OFFICE/OUTPT VISIT, EST, LEVL IV, 30-39 MIN: ICD-10-PCS | Mod: S$GLB,,, | Performed by: INTERNAL MEDICINE

## 2019-04-05 PROCEDURE — 3079F PR MOST RECENT DIASTOLIC BLOOD PRESSURE 80-89 MM HG: ICD-10-PCS | Mod: CPTII,S$GLB,, | Performed by: INTERNAL MEDICINE

## 2019-04-05 PROCEDURE — 3008F PR BODY MASS INDEX (BMI) DOCUMENTED: ICD-10-PCS | Mod: CPTII,S$GLB,, | Performed by: INTERNAL MEDICINE

## 2019-04-05 PROCEDURE — 99999 PR PBB SHADOW E&M-EST. PATIENT-LVL III: ICD-10-PCS | Mod: PBBFAC,,, | Performed by: INTERNAL MEDICINE

## 2019-04-05 PROCEDURE — 99999 PR PBB SHADOW E&M-EST. PATIENT-LVL III: CPT | Mod: PBBFAC,,, | Performed by: INTERNAL MEDICINE

## 2019-04-05 NOTE — PROGRESS NOTES
Subjective:       Patient ID: Ravi Zamorano is a 63 y.o. Black or  male who presents for re-evaluation of progression of Chronic Kidney Disease and Kidney Transplant    HPI   Mr. Zamorano is a 63 year old man with medical history of diabetes, hypertension, ESRD previously on HD status post  donor kidney transplant (HCV positive donor, patient received Harvoni) 2016 presenting for follow up of allograft function and immunosuppression.  Patient recently seen in ED for abdominal pain (noted to have elevated blood pressure), workup negative, symptoms improved since.  Patient reports doing well, has adequate fluid intake, blood sugars well-controlled.  He reports blood pressure 120's systolic at home (had not started hydralazine prescribed by ED).  He otherwise denies any fever, chest pain, shortness of breath, abdominal pain, diarrhea, dysuria/hematuria.  He denies any other recent acute illness/infections/injury.    Review of Systems   Constitutional: Negative for appetite change, fatigue and fever.   Respiratory: Negative for cough and shortness of breath.    Cardiovascular: Negative for chest pain and leg swelling.   Gastrointestinal: Negative for abdominal pain, constipation, diarrhea, nausea and vomiting.   Genitourinary: Negative for dysuria, flank pain, frequency, hematuria and urgency.   Musculoskeletal: Negative for arthralgias, back pain and joint swelling.   Skin: Negative for rash.   Neurological: Negative for dizziness and light-headedness.   All other systems reviewed and are negative.      Objective:      Physical Exam   Constitutional: He appears well-developed and well-nourished.   Cardiovascular: Normal rate and regular rhythm. Exam reveals no gallop and no friction rub.   No murmur heard.  Pulmonary/Chest: Effort normal and breath sounds normal. No respiratory distress. He has no wheezes. He has no rales.   Musculoskeletal: He exhibits no edema.   Neurological: He  is alert.   Skin: Skin is warm and dry. No rash noted.   Vitals reviewed.      Assessment:       1. Chronic kidney disease, stage II (mild)    2. -donor kidney transplant recipient        Plan:     Mr. Zamorano is a 62 year old man with medical history of diabetes, hypertension, ESRD previously on HD status post  donor kidney transplant (HCV positive donor, patient received Harvoni) 2016 presenting for follow up of allograft function and immunosuppression.  Patient creatinine stable, will continue to trend.  Stressed importance of blood pressure/glycemic control to prevent any further progression of kidney disease, patient voiced understanding.   - Immunosuppression: per Transplant  - Anemia: Hgb at goal, no indication for erythropoetin therapy  - Bone/mineral metabolism: patient with secondary hyperparathyroidism, PTH at goal for stage CKD    Return to clinic in 6 months with renal/heme panel, iron/TIBC/ferritin, urinalysis/culture, urine protein/creatinine ratio, PTH/VitD prior to next visit

## 2019-04-12 RX ORDER — PREDNISONE 5 MG/1
5 TABLET ORAL DAILY
Qty: 30 TABLET | Refills: 11 | Status: CANCELLED | OUTPATIENT
Start: 2019-04-12

## 2019-04-12 RX ORDER — PREDNISONE 5 MG/1
5 TABLET ORAL DAILY
Qty: 30 TABLET | Refills: 11 | Status: SHIPPED | OUTPATIENT
Start: 2019-04-12 | End: 2019-11-11 | Stop reason: SDUPTHER

## 2019-05-24 RX ORDER — PEN NEEDLE, DIABETIC 30 GX3/16"
NEEDLE, DISPOSABLE MISCELLANEOUS
Qty: 400 EACH | Refills: 3 | Status: SHIPPED | OUTPATIENT
Start: 2019-05-24 | End: 2020-06-01

## 2019-06-04 RX ORDER — GABAPENTIN 300 MG/1
300 CAPSULE ORAL 3 TIMES DAILY
Qty: 90 CAPSULE | Refills: 4 | Status: SHIPPED | OUTPATIENT
Start: 2019-06-04 | End: 2019-07-26 | Stop reason: SDUPTHER

## 2019-06-04 RX ORDER — CARVEDILOL 25 MG/1
25 TABLET ORAL 2 TIMES DAILY
Qty: 60 TABLET | Refills: 4 | Status: SHIPPED | OUTPATIENT
Start: 2019-06-04 | End: 2019-11-08

## 2019-06-05 DIAGNOSIS — E11.42 TYPE 2 DIABETES MELLITUS WITH POLYNEUROPATHY: ICD-10-CM

## 2019-07-25 NOTE — PROGRESS NOTES
Available results reviewed and require no immediate action.
- DVT ppx with heparin sq.  - Diabetic diet.

## 2019-07-26 ENCOUNTER — OFFICE VISIT (OUTPATIENT)
Dept: INTERNAL MEDICINE | Facility: CLINIC | Age: 64
End: 2019-07-26
Payer: COMMERCIAL

## 2019-07-26 VITALS
SYSTOLIC BLOOD PRESSURE: 148 MMHG | DIASTOLIC BLOOD PRESSURE: 78 MMHG | BODY MASS INDEX: 26.77 KG/M2 | HEIGHT: 73 IN | WEIGHT: 202 LBS

## 2019-07-26 DIAGNOSIS — Z94.0 S/P KIDNEY TRANSPLANT: ICD-10-CM

## 2019-07-26 DIAGNOSIS — E11.3393 TYPE 2 DIABETES MELLITUS WITH BOTH EYES AFFECTED BY MODERATE NONPROLIFERATIVE RETINOPATHY WITHOUT MACULAR EDEMA, WITHOUT LONG-TERM CURRENT USE OF INSULIN: ICD-10-CM

## 2019-07-26 DIAGNOSIS — R06.02 SOB (SHORTNESS OF BREATH): ICD-10-CM

## 2019-07-26 DIAGNOSIS — E11.42 DIABETIC POLYNEUROPATHY ASSOCIATED WITH TYPE 2 DIABETES MELLITUS: ICD-10-CM

## 2019-07-26 DIAGNOSIS — N18.2 CKD (CHRONIC KIDNEY DISEASE) STAGE 2, GFR 60-89 ML/MIN: Chronic | ICD-10-CM

## 2019-07-26 DIAGNOSIS — Z00.00 HEALTH CARE MAINTENANCE: ICD-10-CM

## 2019-07-26 DIAGNOSIS — R06.09 DYSPNEA ON EXERTION: ICD-10-CM

## 2019-07-26 DIAGNOSIS — Z79.4 TYPE 2 DIABETES MELLITUS WITH STAGE 2 CHRONIC KIDNEY DISEASE, WITH LONG-TERM CURRENT USE OF INSULIN: Primary | ICD-10-CM

## 2019-07-26 DIAGNOSIS — E11.22 TYPE 2 DIABETES MELLITUS WITH STAGE 2 CHRONIC KIDNEY DISEASE, WITH LONG-TERM CURRENT USE OF INSULIN: Primary | ICD-10-CM

## 2019-07-26 DIAGNOSIS — M25.562 CHRONIC PAIN OF LEFT KNEE: ICD-10-CM

## 2019-07-26 DIAGNOSIS — G89.29 CHRONIC PAIN OF LEFT KNEE: ICD-10-CM

## 2019-07-26 DIAGNOSIS — N18.2 TYPE 2 DIABETES MELLITUS WITH STAGE 2 CHRONIC KIDNEY DISEASE, WITH LONG-TERM CURRENT USE OF INSULIN: Primary | ICD-10-CM

## 2019-07-26 DIAGNOSIS — I50.32 CHRONIC DIASTOLIC CONGESTIVE HEART FAILURE: ICD-10-CM

## 2019-07-26 PROCEDURE — 3044F HG A1C LEVEL LT 7.0%: CPT | Mod: CPTII,S$GLB,, | Performed by: INTERNAL MEDICINE

## 2019-07-26 PROCEDURE — 3008F BODY MASS INDEX DOCD: CPT | Mod: CPTII,S$GLB,, | Performed by: INTERNAL MEDICINE

## 2019-07-26 PROCEDURE — 3077F PR MOST RECENT SYSTOLIC BLOOD PRESSURE >= 140 MM HG: ICD-10-PCS | Mod: CPTII,S$GLB,, | Performed by: INTERNAL MEDICINE

## 2019-07-26 PROCEDURE — 99999 PR PBB SHADOW E&M-EST. PATIENT-LVL V: CPT | Mod: PBBFAC,,, | Performed by: INTERNAL MEDICINE

## 2019-07-26 PROCEDURE — 3078F PR MOST RECENT DIASTOLIC BLOOD PRESSURE < 80 MM HG: ICD-10-PCS | Mod: CPTII,S$GLB,, | Performed by: INTERNAL MEDICINE

## 2019-07-26 PROCEDURE — 3077F SYST BP >= 140 MM HG: CPT | Mod: CPTII,S$GLB,, | Performed by: INTERNAL MEDICINE

## 2019-07-26 PROCEDURE — 99999 PR PBB SHADOW E&M-EST. PATIENT-LVL V: ICD-10-PCS | Mod: PBBFAC,,, | Performed by: INTERNAL MEDICINE

## 2019-07-26 PROCEDURE — 99215 PR OFFICE/OUTPT VISIT, EST, LEVL V, 40-54 MIN: ICD-10-PCS | Mod: S$GLB,,, | Performed by: INTERNAL MEDICINE

## 2019-07-26 PROCEDURE — 3078F DIAST BP <80 MM HG: CPT | Mod: CPTII,S$GLB,, | Performed by: INTERNAL MEDICINE

## 2019-07-26 PROCEDURE — 99215 OFFICE O/P EST HI 40 MIN: CPT | Mod: S$GLB,,, | Performed by: INTERNAL MEDICINE

## 2019-07-26 PROCEDURE — 3008F PR BODY MASS INDEX (BMI) DOCUMENTED: ICD-10-PCS | Mod: CPTII,S$GLB,, | Performed by: INTERNAL MEDICINE

## 2019-07-26 PROCEDURE — 3044F PR MOST RECENT HEMOGLOBIN A1C LEVEL <7.0%: ICD-10-PCS | Mod: CPTII,S$GLB,, | Performed by: INTERNAL MEDICINE

## 2019-07-26 RX ORDER — GABAPENTIN 300 MG/1
CAPSULE ORAL
Qty: 450 CAPSULE | Refills: 3 | Status: SHIPPED | OUTPATIENT
Start: 2019-07-26 | End: 2020-08-19 | Stop reason: SDUPTHER

## 2019-07-26 RX ORDER — GABAPENTIN 300 MG/1
CAPSULE ORAL
Qty: 450 CAPSULE | Refills: 3 | Status: SHIPPED | OUTPATIENT
Start: 2019-07-26 | End: 2019-07-26 | Stop reason: SDUPTHER

## 2019-07-26 NOTE — PROGRESS NOTES
"Subjective:       Patient ID: Ravi Zamorano is a 63 y.o. male.    Chief Complaint: Annual Exam (general check up.); Shortness of Breath (started a few mo ago, reports of when cutting grass mainly, he gets short winded easily. denies any chest pain or dyspnea. ); and Numbness (reports of pain & numbness in LINDSAY feet, possible neuropathy. patient takes Gabapentin (no relief).)    HPI   63 yo M with controlled DM, CKD 2, hx kidney transplant presents for evaluation of pain and numbness in feet and shortness of breath with exertion, left knee pain.     Left knee pain  Especially bothers him when cutting his grass.  Takes tylenol w some improvement.    Dyspnea with exertion  Especially when cutting grass  Stress 2016 - diastolic dysfunction    Numbness of feet  No relief from gabapentin    Gabapentin 300mg am; 600mg qhs  levemir 35 u qhs  humalog 18u bf, 16 u lunch and dinner  Nifedipine   carvedilol  Hydralazine    Review of Systems   Constitutional: Negative for fever.   Respiratory: Negative for shortness of breath.    Cardiovascular: Negative for chest pain.   Musculoskeletal: Negative.    Skin: Negative.        Objective:   BP (!) 148/78   Ht 6' 1" (1.854 m)   Wt 91.6 kg (202 lb)   BMI 26.65 kg/m²      Physical Exam   Constitutional: He is oriented to person, place, and time. He appears well-developed and well-nourished. No distress.   HENT:   Head: Normocephalic and atraumatic.   Cardiovascular: Normal rate and regular rhythm.   No murmur heard.  Pulmonary/Chest: Effort normal. No respiratory distress. He has no wheezes. He has no rales.   Neurological: He is alert and oriented to person, place, and time.   Skin: Skin is warm and dry. He is not diaphoretic.   Psychiatric: He has a normal mood and affect. His behavior is normal.       Protective Sensation (w/ 10 gram monofilament):  Right: Intact  Left: Intact    Visual Inspection:  Normal -  Bilateral    Pedal Pulses:   Right: Present  Left: " Present    Posterior tibialis:   Right:Present  Left: Present    Assessment:       1. Type 2 diabetes mellitus with stage 2 chronic kidney disease, with long-term current use of insulin    2. CKD (chronic kidney disease) stage 2, GFR 60-89 ml/min    3. S/P cadaveric kidney transplant 11/5/2016. ESRD secondary to HTN/DMII    4. Type 2 diabetes mellitus with both eyes affected by moderate nonproliferative retinopathy without macular edema, without long-term current use of insulin    5. SOB (shortness of breath)    6. Diabetic polyneuropathy associated with type 2 diabetes mellitus    7. Chronic diastolic congestive heart failure    8. Dyspnea on exertion    9. Chronic pain of left knee    10. Health care maintenance        Plan:       Ravi was seen today for annual exam, shortness of breath and numbness.    Diagnoses and all orders for this visit:    Type 2 diabetes mellitus with stage 2 chronic kidney disease and by moderate nonproliferative retinopathy without macular edema with long-term current use of insulin  -     Hemoglobin A1c; Future  -     Lipid panel; Future  -     Comprehensive metabolic panel; Future  -     Ambulatory referral to Optometry    CKD (chronic kidney disease) stage 2, GFR 60-89 ml/min  S/P cadaveric kidney transplant 11/5/2016. ESRD secondary to HTN/DMII  followed by transplant, nephrology     Diabetic polyneuropathy associated with type 2 diabetes mellitus  -     INCREASE gabapentin (NEURONTIN) 300 MG capsule; Take 1 tablet am; 1 tablet midday and 3 tablets at night    Chronic diastolic congestive heart failure  -     Brain natriuretic peptide; Future  -     EKG 12-LEAD - Muse; Future  -     Echocardiogram stress test with CFD; Future  -     Ambulatory Referral to Cardiology    Dyspnea on exertion  -     EKG 12-LEAD - Muse; Future  -     Echocardiogram stress test with CFD; Future  -     Ambulatory Referral to Cardiology    Chronic pain of left knee  -     X-ray Knee Ortho Left; Future  -      Ambulatory referral to Orthopedics    Health care maintenance  -     Case request GI: COLONOSCOPY    45  minutes were spent with patient with over half of this time spent in coordination of care and/or counseling.

## 2019-07-26 NOTE — TELEPHONE ENCOUNTER
----- Message from Kaia Alvares sent at 7/26/2019  3:20 PM CDT -----  Contact: [phone]  Patient requesting medication gabapentin (NEURONTIN) 300 MG capsule sent to [facility] . Please call and advise, Thanks

## 2019-07-31 NOTE — PROGRESS NOTES
Cardiology Clinic Note  Reason for Visit: Chronic diastolic HF and DOSS    HPI:   Mr. Ravi Zamorano is a 63 y.o. gentleman with history of controlled DM, CKD 2, hx kidney transplant, DD here for DOSS that has occurred recently. He was seen by his PCP recently who had noted that he was complaining of DOSS when he cuts his grass. She ordered a BNP, EKG and DSE. Patient is a former smoker (quit 3 years ago).    Notably his past medical hx shows that he had a prior DSE in 2016 which showed no evidence of WMA but showed DD. He had a similar results in 2014. Last EKG was in 03/2019 which showed NSR with non-specific ST segment abnormalities. Similar findings were seen in 2016.     Today he reports he gets short of breath while cutting grass and has to sit down to catch his breath before he can resume. Has been going on for 3 months and getting worse but denies any chest pain. He also reports that activity is limited by knee pain associated with cramps in calf muscle due to some injury in the past while he passed out and fell awkwardly on his knees.    Denies leg swelling, cough,orthopnea/PND, LOC, claudication, nausea, vomiting, fever, chills.    Denies any CVA, MI in the past.    TSH 03/2019 - 1.14     ROS:    Review of Systems   Constitution: Negative for chills, fever and weight gain.   HENT: Negative for congestion and ear pain.    Cardiovascular:        As per HPI   Respiratory: Positive for shortness of breath. Negative for cough, sputum production and wheezing.    Musculoskeletal: Positive for joint pain (left knee).   Gastrointestinal: Negative for diarrhea, nausea and vomiting.   Genitourinary: Negative for dysuria, flank pain and frequency.   Neurological: Negative for headaches and light-headedness.     PMH:     Past Medical History:   Diagnosis Date    Anxiety 7/29/2014    CKD (chronic kidney disease) stage 2, GFR 60-89 ml/min 12/28/2016    CKD (chronic kidney disease) stage 4, GFR 15-29 ml/min  7/29/2014    Depression - situational 7/29/2014    Diabetes mellitus     Diabetes type 2 since 2000 7/29/2014    Diabetic neuropathy 7/29/2014    History of hepatitis C, s/p successful Rx w/ SVR24 - 9/2017 7/29/2014    Genotype 1a, treatment naive 10/2014 liver biopsy - grade 1 / stage 1 Completed Harvoni w/ SVR    Hyperlipidemia 7/29/2014    Hypertension     Neuropathy     Organ transplant candidate 7/29/2014    Pancreatitis      Past Surgical History:   Procedure Laterality Date    APPENDECTOMY      BIOPSY LIVER AND ULTRASOUND N/A 10/3/2014    Performed by [facility] Diagnostic Provider at [facility] OR 2ND FLR    COLONOSCOPY N/A 9/16/2014    Performed by Montez Saunders MD at [facility] ENDO (4TH FLR)    KIDNEY TRANSPLANT      REMOVAL-CATHETER-PERITONEAL DIALYSIS  11/5/2016    Performed by Constantino Gillette MD at [facility] OR 2ND FLR    TRANSPLANT-KIDNEY N/A 11/5/2016    Performed by Constantino Gillette MD at [facility] OR 2ND FLR     Allergies:   Review of patient's allergies indicates:  No Known Allergies  Medications:     Current Outpatient Medications on File Prior to Visit   Medication Sig Dispense Refill    aspirin 81 MG Chew Take 81 mg by mouth once daily.      atorvastatin (LIPITOR) 10 MG tablet Take 1 tablet (10 mg total) by mouth once daily. 90 tablet 3    blood sugar diagnostic Strp 1 strip by Misc.(Non-Drug; Combo Route) route 4 (four) times daily before meals and nightly. e11.65 100 strip 5    blood sugar diagnostic Strp test blood sugar four times daily 200 each 6    blood-glucose meter (FREESTYLE SYSTEM KIT) kit Use as instructed e11.65 may substitute meter as covered by insurance 1 each 0    carvedilol (COREG) 25 MG tablet Take 1 tablet (25 mg total) by mouth 2 (two) times daily. 60 tablet 4    cholecalciferol, vitamin D3, 1,000 unit capsule Take 1 capsule (1,000 Units total) by mouth once daily.  0    famotidine (PEPCID) 20 MG tablet Take 1 tablet (20 mg total) by mouth every evening. 90 tablet 3     "gabapentin (NEURONTIN) 300 MG capsule Take 1 tablet in the morning; 1 tablet midday and 3 tablets at night. 450 capsule 3    hydrALAZINE (APRESOLINE) 25 MG tablet Take 1 tablet (25 mg total) by mouth 2 (two) times daily. 60 tablet 1    insulin detemir U-100 (LEVEMIR FLEXTOUCH) 100 unit/mL (3 mL) SubQ InPn pen Inject 35 Units into the skin every evening. 15 mL 4    insulin lispro (HUMALOG KWIKPEN INSULIN) 100 unit/mL pen Inject 18 units w/ breakfast, 16 units w/ lunch and dinner plus scale 150-200 +2, 201-250 +4, 251-300 +6, 301-350 +8. 30 mL 4    lancets Misc 1 each by Misc.(Non-Drug; Combo Route) route 4 (four) times daily before meals and nightly. e11.65 100 each 0    meclizine (ANTIVERT) 25 mg tablet Take 1 tablet (25 mg total) by mouth 3 (three) times daily as needed for Dizziness. 21 tablet 0    melatonin 5 mg Tab Take 1 tablet (5 mg total) by mouth every evening.      multivitamin (THERAGRAN) tablet Take 1 tablet by mouth once daily.      mycophenolate (CELLCEPT) 250 mg Cap Take 4 capsules (1,000 mg total) by mouth 2 (two) times daily. 240 capsule 11    NIFEdipine (PROCARDIA-XL) 30 MG (OSM) 24 hr tablet Take 1 tablet (30 mg total) by mouth once daily. 30 tablet 11    pen needle, diabetic 32 gauge x 5/32" Ndle USE TO ADMINISTER INSULIN 4 TIMES DAILY. 400 each 3    predniSONE (DELTASONE) 5 MG tablet Take 1 tablet (5 mg total) by mouth once daily. 30 tablet 11    tacrolimus (PROGRAF) 0.5 MG Cap Take 1mg (2 capsules)  orally in the am and 1 mg (2 capsule) orally in the pm 120 capsule 11     No current facility-administered medications on file prior to visit.      Social History:     Social History     Tobacco Use    Smoking status: Former Smoker     Packs/day: 0.50     Years: 32.00     Pack years: 16.00     Types: Cigarettes     Last attempt to quit: 2016     Years since quittin.6    Smokeless tobacco: Never Used    Tobacco comment: Pt reports that he quit in , but started up again in " "2015. pt reports he is currently working on quitting again   Substance Use Topics    Alcohol use: Yes     Comment: Pt reports occasional beers on Sundays. Pt reports drinking daily prior to ESRD diagnosis.     Family History:     Family History   Problem Relation Age of Onset    Diabetes Mother     Hypertension Mother     Heart disease Mother         CAD with PCI    Heart disease Father     Cancer Brother         one sister with breast cancer    Hypertension Brother         one sister with HTN and borderline DM    Stroke Neg Hx     Kidney disease Neg Hx      Physical Exam:   /76 (BP Location: Left arm, Patient Position: Sitting, BP Method: Large (Manual))   Pulse 84   Ht 6' 1" (1.854 m)   Wt 92.1 kg (203 lb 0.7 oz)   SpO2 98%   BMI 26.79 kg/m²       Physical Exam   Constitutional: He is oriented to person, place, and time. He appears well-developed and well-nourished.   HENT:   Head: Normocephalic and atraumatic.   Neck: No JVD present. No thyromegaly present.   Cardiovascular: Normal rate and normal heart sounds.   Irregularly irregular rhythm  DP 2+ bilaterally. PT on left weaker than right.   Pulmonary/Chest: Effort normal and breath sounds normal. No respiratory distress. He has no wheezes.   Abdominal: Soft. Bowel sounds are normal. He exhibits no distension. There is no tenderness.   Musculoskeletal: He exhibits no edema.   Lymphadenopathy:     He has no cervical adenopathy.   Neurological: He is alert and oriented to person, place, and time.   Skin: Skin is warm and dry. No rash noted.   Vitals reviewed.      Labs:     Lab Results   Component Value Date     03/16/2019    K 4.1 03/16/2019     03/16/2019    CO2 27 03/16/2019    BUN 11 03/16/2019    CREATININE 1.0 03/16/2019    ANIONGAP 13 03/16/2019     Lab Results   Component Value Date    HGBA1C 6.7 (H) 08/21/2018     Lab Results   Component Value Date     (H) 10/23/2014    Lab Results   Component Value Date    WBC 9.63 " 03/16/2019    HGB 13.6 (L) 03/16/2019    HCT 46.6 03/16/2019    HCT 31 (L) 11/05/2016     (L) 03/16/2019    GRAN 6.7 03/16/2019    GRAN 69.6 03/16/2019     Lab Results   Component Value Date    CHOL 148 11/27/2017    HDL 36 (L) 11/27/2017    LDLCALC 78.8 11/27/2017    TRIG 166 (H) 11/27/2017          Imaging:     DSE 2016:  CONCLUSIONS     1 - Enlarged ascending aorta.     2 - Concentric hypertrophy.     3 - Normal left ventricular systolic function (EF 60-65%).     4 - Left ventricular diastolic dysfunction.     5 - Mild mitral regurgitation.     6 - The estimated PA systolic pressure is greater than 21 mmHg.     7 - Normal right ventricular systolic function .     8 - Right ventricular hypertrophy.     9 - Trivial tricuspid regurgitation.     No evidence of stress induced myocardial ischemia.     DSE 2014:    1 - Concentric hypertrophy.     2 - Normal left ventricular systolic function (EF 60-65%).     3 - Left ventricular diastolic dysfunction.     4 - Normal right ventricular systolic function .     5 - Biatrial enlargement.     6 - Mildly enlarged ascending aorta.     7 - Trivial mitral regurgitation.     8 - Trivial to mild tricuspid regurgitation.     9 - Trivial pericardial effusion.     EKG:   Afib here today    Assessment:     1. DOSS (dyspnea on exertion)   Patient presents with complaints of worsening DOSS with NYHA class II sx w/o chest pain. Here with new onset Afib see section. Would hold off on DSE for now till in NSR and will assess sx at that time.    2. Essential hypertension - well controlled at goal less than 130/80. Continue current medical regimen.   3. Hyperlipidemia, unspecified hyperlipidemia type - recent LDL is 99 on Lipitor 10 mg   4. New Onset Afib - Pt found to be in afib. Rate controlled in the 70's. CHADS2-VASC score 2 (risk of CVA is 2.2%) HAS-BLED score is 1 (risk of bleeding is 1%) Will place patient on Eliquis 5 mg BID and refer to EP for FELICIANO/DCCV. Will check a TTE as well     5. Chronic diastolic dysfunction - HTN controlled. Euvolemic on PEX.       Will consider WENDI's in the future for potential PAD (based on PEX)    Plan:   DOSS (dyspnea on exertion)  -     IN OFFICE EKG 12-LEAD (to Muse)  -     apixaban (ELIQUIS) 5 mg Tab; Take 1 tablet (5 mg total) by mouth 2 (two) times daily.  Dispense: 60 tablet; Refill: 11  -     Ambulatory Referral to Electrophysiology  -     Transthoracic echo (TTE) 2D with Color Flow; Future    Essential hypertension  -stated above    Hyperlipidemia, unspecified hyperlipidemia type  -stated above    Chronic diastolic congestive heart failure  - stated above    New onset a-fib  -     apixaban (ELIQUIS) 5 mg Tab; Take 1 tablet (5 mg total) by mouth 2 (two) times daily.  Dispense: 60 tablet; Refill: 11  -     Ambulatory Referral to Electrophysiology  -     Transthoracic echo (TTE) 2D with Color Flow; Future    Discussed with Dr. Bowden. RTC in 1 month after     Signed:  Mishel Posada MD  7/31/2019 5:17 PM

## 2019-08-01 ENCOUNTER — HOSPITAL ENCOUNTER (OUTPATIENT)
Dept: RADIOLOGY | Facility: HOSPITAL | Age: 64
Discharge: HOME | End: 2019-08-01
Attending: INTERNAL MEDICINE
Payer: COMMERCIAL

## 2019-08-01 ENCOUNTER — OFFICE VISIT (OUTPATIENT)
Dept: CARDIOLOGY | Facility: CLINIC | Age: 64
End: 2019-08-01
Payer: COMMERCIAL

## 2019-08-01 VITALS
DIASTOLIC BLOOD PRESSURE: 76 MMHG | WEIGHT: 203.06 LBS | OXYGEN SATURATION: 98 % | BODY MASS INDEX: 26.91 KG/M2 | HEART RATE: 84 BPM | HEIGHT: 73 IN | SYSTOLIC BLOOD PRESSURE: 130 MMHG

## 2019-08-01 DIAGNOSIS — I10 ESSENTIAL HYPERTENSION: ICD-10-CM

## 2019-08-01 DIAGNOSIS — I48.91 NEW ONSET A-FIB: ICD-10-CM

## 2019-08-01 DIAGNOSIS — M25.562 CHRONIC PAIN OF LEFT KNEE: ICD-10-CM

## 2019-08-01 DIAGNOSIS — I50.32 CHRONIC DIASTOLIC CONGESTIVE HEART FAILURE: ICD-10-CM

## 2019-08-01 DIAGNOSIS — E78.5 HYPERLIPIDEMIA, UNSPECIFIED HYPERLIPIDEMIA TYPE: ICD-10-CM

## 2019-08-01 DIAGNOSIS — R06.09 DOE (DYSPNEA ON EXERTION): Primary | ICD-10-CM

## 2019-08-01 DIAGNOSIS — G89.29 CHRONIC PAIN OF LEFT KNEE: ICD-10-CM

## 2019-08-01 PROCEDURE — 73562 X-RAY EXAM OF KNEE 3: CPT | Mod: TC,LT

## 2019-08-01 PROCEDURE — 73560 X-RAY EXAM OF KNEE 1 OR 2: CPT | Mod: TC,RT

## 2019-08-01 PROCEDURE — 99204 PR OFFICE/OUTPT VISIT, NEW, LEVL IV, 45-59 MIN: ICD-10-PCS | Mod: S$GLB,,, | Performed by: INTERNAL MEDICINE

## 2019-08-01 PROCEDURE — 3078F DIAST BP <80 MM HG: CPT | Mod: CPTII,S$GLB,, | Performed by: INTERNAL MEDICINE

## 2019-08-01 PROCEDURE — 93000 EKG 12-LEAD: ICD-10-PCS | Mod: S$GLB,,, | Performed by: INTERNAL MEDICINE

## 2019-08-01 PROCEDURE — 3078F PR MOST RECENT DIASTOLIC BLOOD PRESSURE < 80 MM HG: ICD-10-PCS | Mod: CPTII,S$GLB,, | Performed by: INTERNAL MEDICINE

## 2019-08-01 PROCEDURE — 3075F PR MOST RECENT SYSTOLIC BLOOD PRESS GE 130-139MM HG: ICD-10-PCS | Mod: CPTII,S$GLB,, | Performed by: INTERNAL MEDICINE

## 2019-08-01 PROCEDURE — 73560 XR KNEE ORTHO LEFT: ICD-10-PCS | Mod: 26,59,RT, | Performed by: RADIOLOGY

## 2019-08-01 PROCEDURE — 99999 PR PBB SHADOW E&M-EST. PATIENT-LVL V: CPT | Mod: PBBFAC,,, | Performed by: STUDENT IN AN ORGANIZED HEALTH CARE EDUCATION/TRAINING PROGRAM

## 2019-08-01 PROCEDURE — 93000 ELECTROCARDIOGRAM COMPLETE: CPT | Mod: S$GLB,,, | Performed by: INTERNAL MEDICINE

## 2019-08-01 PROCEDURE — 3008F BODY MASS INDEX DOCD: CPT | Mod: CPTII,S$GLB,, | Performed by: INTERNAL MEDICINE

## 2019-08-01 PROCEDURE — 99204 OFFICE O/P NEW MOD 45 MIN: CPT | Mod: S$GLB,,, | Performed by: INTERNAL MEDICINE

## 2019-08-01 PROCEDURE — 73560 X-RAY EXAM OF KNEE 1 OR 2: CPT | Mod: 26,59,RT, | Performed by: RADIOLOGY

## 2019-08-01 PROCEDURE — 73562 X-RAY EXAM OF KNEE 3: CPT | Mod: 26,LT,, | Performed by: RADIOLOGY

## 2019-08-01 PROCEDURE — 99999 PR PBB SHADOW E&M-EST. PATIENT-LVL V: ICD-10-PCS | Mod: PBBFAC,,, | Performed by: STUDENT IN AN ORGANIZED HEALTH CARE EDUCATION/TRAINING PROGRAM

## 2019-08-01 PROCEDURE — 73562 XR KNEE ORTHO LEFT: ICD-10-PCS | Mod: 26,LT,, | Performed by: RADIOLOGY

## 2019-08-01 PROCEDURE — 3008F PR BODY MASS INDEX (BMI) DOCUMENTED: ICD-10-PCS | Mod: CPTII,S$GLB,, | Performed by: INTERNAL MEDICINE

## 2019-08-01 PROCEDURE — 3075F SYST BP GE 130 - 139MM HG: CPT | Mod: CPTII,S$GLB,, | Performed by: INTERNAL MEDICINE

## 2019-08-02 ENCOUNTER — HOSPITAL ENCOUNTER (OUTPATIENT)
Dept: CARDIOLOGY | Facility: CLINIC | Age: 64
Discharge: HOME | End: 2019-08-02
Attending: STUDENT IN AN ORGANIZED HEALTH CARE EDUCATION/TRAINING PROGRAM
Payer: COMMERCIAL

## 2019-08-02 ENCOUNTER — TELEPHONE (OUTPATIENT)
Dept: INTERNAL MEDICINE | Facility: CLINIC | Age: 64
End: 2019-08-02

## 2019-08-02 VITALS
BODY MASS INDEX: 26.9 KG/M2 | SYSTOLIC BLOOD PRESSURE: 130 MMHG | DIASTOLIC BLOOD PRESSURE: 76 MMHG | HEART RATE: 80 BPM | WEIGHT: 203 LBS | HEIGHT: 73 IN

## 2019-08-02 DIAGNOSIS — E11.3393 TYPE 2 DIABETES MELLITUS WITH BOTH EYES AFFECTED BY MODERATE NONPROLIFERATIVE RETINOPATHY WITHOUT MACULAR EDEMA, WITHOUT LONG-TERM CURRENT USE OF INSULIN: Primary | ICD-10-CM

## 2019-08-02 DIAGNOSIS — E78.5 HYPERLIPIDEMIA, UNSPECIFIED HYPERLIPIDEMIA TYPE: ICD-10-CM

## 2019-08-02 DIAGNOSIS — R06.09 DOE (DYSPNEA ON EXERTION): ICD-10-CM

## 2019-08-02 DIAGNOSIS — I48.91 NEW ONSET A-FIB: ICD-10-CM

## 2019-08-02 LAB
ASCENDING AORTA: 3.89 CM
AV INDEX (PROSTH): 0.97
AV MEAN GRADIENT: 2 MMHG
AV PEAK GRADIENT: 5 MMHG
AV VALVE AREA: 3.47 CM2
AV VELOCITY RATIO: 0.84
BSA FOR ECHO PROCEDURE: 2.18 M2
CV ECHO LV RWT: 0.36 CM
DOP CALC AO PEAK VEL: 1.07 M/S
DOP CALC AO VTI: 18.65 CM
DOP CALC LVOT AREA: 3.6 CM2
DOP CALC LVOT DIAMETER: 2.14 CM
DOP CALC LVOT PEAK VEL: 0.9 M/S
DOP CALC LVOT STROKE VOLUME: 64.78 CM3
DOP CALCLVOT PEAK VEL VTI: 18.02 CM
E/E' RATIO: 11.1 M/S
ECHO LV POSTERIOR WALL: 0.97 CM (ref 0.6–1.1)
FRACTIONAL SHORTENING: 31 % (ref 28–44)
INTERVENTRICULAR SEPTUM: 1.14 CM (ref 0.6–1.1)
LA MAJOR: 7.01 CM
LA MINOR: 6.1 CM
LA WIDTH: 4.41 CM
LEFT ATRIUM SIZE: 4.81 CM
LEFT ATRIUM VOLUME INDEX: 54.3 ML/M2
LEFT ATRIUM VOLUME: 117.62 CM3
LEFT INTERNAL DIMENSION IN SYSTOLE: 3.72 CM (ref 2.1–4)
LEFT VENTRICLE DIASTOLIC VOLUME INDEX: 64.23 ML/M2
LEFT VENTRICLE DIASTOLIC VOLUME: 139.09 ML
LEFT VENTRICLE MASS INDEX: 101 G/M2
LEFT VENTRICLE SYSTOLIC VOLUME INDEX: 27.2 ML/M2
LEFT VENTRICLE SYSTOLIC VOLUME: 58.94 ML
LEFT VENTRICULAR INTERNAL DIMENSION IN DIASTOLE: 5.36 CM (ref 3.5–6)
LEFT VENTRICULAR MASS: 219.28 G
LV LATERAL E/E' RATIO: 8.54 M/S
LV SEPTAL E/E' RATIO: 15.86 M/S
MV PEAK E VEL: 1.11 M/S
PISA TR MAX VEL: 2.3 M/S
RA MAJOR: 5.74 CM
RA PRESSURE: 3 MMHG
RA WIDTH: 4.52 CM
RIGHT VENTRICULAR END-DIASTOLIC DIMENSION: 4.62 CM
RV TISSUE DOPPLER FREE WALL SYSTOLIC VELOCITY 1 (APICAL 4 CHAMBER VIEW): 9.82 CM/S
SINUS: 3.47 CM
STJ: 2.85 CM
TDI LATERAL: 0.13 M/S
TDI SEPTAL: 0.07 M/S
TDI: 0.1 M/S
TR MAX PG: 21 MMHG
TRICUSPID ANNULAR PLANE SYSTOLIC EXCURSION: 1.41 CM
TV REST PULMONARY ARTERY PRESSURE: 24 MMHG

## 2019-08-02 PROCEDURE — 93306 TRANSTHORACIC ECHO (TTE) COMPLETE (CUPID ONLY): ICD-10-PCS | Mod: 26,,, | Performed by: INTERNAL MEDICINE

## 2019-08-02 PROCEDURE — 93306 TTE W/DOPPLER COMPLETE: CPT

## 2019-08-02 PROCEDURE — 93306 TTE W/DOPPLER COMPLETE: CPT | Mod: 26,,, | Performed by: INTERNAL MEDICINE

## 2019-08-02 NOTE — TELEPHONE ENCOUNTER
Please call patient.  His x-ray showed mild arthritis.  Keep appointment with Orthopedics.    His A1c is above goal at 8.6%.  Please work on diet. Referred to endocrinology. Please also send in BG log so I can review and adjust insulin in meantime if indicated.     His cholesterol is above goal.  Would he be willing to increase his atorvastatin to 40 mg?    Repeat labs 3 months prior to next appt. Not sure why 3 month follow up is scheduled in 1 month - can change to 3.

## 2019-08-05 ENCOUNTER — TELEPHONE (OUTPATIENT)
Dept: CARDIOLOGY | Facility: HOSPITAL | Age: 64
End: 2019-08-05

## 2019-08-05 NOTE — TELEPHONE ENCOUNTER
Called and informed the patient of his results of his TTE which showed normal EF. Advised to keep his appt with Dr. Bronson Bowden with EP and advised to continue his Eliquis.     Mishel Posada MD, PGY-6  Cardiology fellow  Pager [phone]

## 2019-08-06 NOTE — TELEPHONE ENCOUNTER
Spoke with pt, notified of results. Pt says he does not have a BG log but he will start one and send reading in a week. Pt says he is okay with increasing atorvastatin to 40mg. Sent to [facility] pharmacy on file      Labs and appt scheduled

## 2019-08-07 DIAGNOSIS — I50.32 CHRONIC DIASTOLIC CONGESTIVE HEART FAILURE: Primary | ICD-10-CM

## 2019-08-07 RX ORDER — ATORVASTATIN CALCIUM 40 MG/1
40 TABLET, FILM COATED ORAL DAILY
Qty: 90 TABLET | Refills: 3 | Status: SHIPPED | OUTPATIENT
Start: 2019-08-07 | End: 2020-07-31 | Stop reason: SDUPTHER

## 2019-08-09 PROBLEM — I48.91 NEW ONSET A-FIB: Status: RESOLVED | Noted: 2019-08-01 | Resolved: 2019-08-09

## 2019-08-09 PROBLEM — I48.19 PERSISTENT ATRIAL FIBRILLATION: Status: ACTIVE | Noted: 2019-08-09

## 2019-08-09 NOTE — PROGRESS NOTES
Subjective:     HPI    Cardiologist: Mishel Posada MD    I had the pleasure of seeing Ravi Zamorano in consultation at your request for the evaluation of atrial fibrillation. He is a 63 year old male with a history of DM on insulin, CKD2, history of kidney transplant in 2016 (Cr 1.2 in 8/2019), and diastolic dysfunction, who has a several month history of DOSS. He was seen in cardiology clinic (8/1/2019) where an ECG showed rate controlled AF (last ECG in the EMR from 3/15/2019 shows sinus rhythm). He was placed on Eliquis 5 mg bid, and presents to me today to discuss management options.    Recent cardiac studies include an echo performed in 8/2019 which showed an EF of 55% and severe LAE (MINA 54.3 mL/m2). A stress echo is pending.    My interpretation of today's ECG is AF at 65 bpm.    Review of Systems   Constitution: Positive for malaise/fatigue. Negative for decreased appetite, weight gain and weight loss.   HENT: Negative for sore throat.    Eyes: Negative for blurred vision.   Cardiovascular: Positive for dyspnea on exertion. Negative for chest pain, irregular heartbeat, leg swelling, near-syncope, orthopnea, palpitations, paroxysmal nocturnal dyspnea and syncope.   Respiratory: Negative for shortness of breath.    Skin: Negative for rash.   Musculoskeletal: Negative for arthritis.   Gastrointestinal: Negative for abdominal pain.   Neurological: Negative for focal weakness.   Psychiatric/Behavioral: Negative for altered mental status.        Objective:    Physical Exam   Constitutional: He is oriented to person, place, and time. He appears well-developed and well-nourished. No distress.   HENT:   Head: Normocephalic and atraumatic.   Mouth/Throat: Oropharynx is clear and moist.   Eyes: Pupils are equal, round, and reactive to light. No scleral icterus.   Neck: Neck supple. No thyromegaly present.   Cardiovascular: Normal heart sounds and normal pulses. An irregularly irregular rhythm present. Exam reveals  no gallop and no friction rub.   No murmur heard.  Pulmonary/Chest: Effort normal and breath sounds normal.   Abdominal: Soft. Bowel sounds are normal. He exhibits no distension. There is no tenderness.   Musculoskeletal: He exhibits no edema.   Neurological: He is alert and oriented to person, place, and time.   Skin: Skin is warm and dry. No rash noted.   Psychiatric: He has a normal mood and affect. His behavior is normal.   Vitals reviewed.        Assessment:       1. CKD (chronic kidney disease) stage 2, GFR 60-89 ml/min    2. Persistent atrial fibrillation    3. S/P cadaveric kidney transplant 11/5/2016. ESRD secondary to HTN/DMII    4. Chronic diastolic congestive heart failure    5. Hyperlipidemia, unspecified hyperlipidemia type    6. Essential hypertension    7. Type 2 diabetes mellitus with both eyes affected by moderate nonproliferative retinopathy without macular edema, without long-term current use of insulin    8. Diabetic polyneuropathy associated with type 2 diabetes mellitus    9. History of hepatitis C, s/p successful Rx w/ SVR24 - 9/2017    10. Type 2 diabetes mellitus with stage 2 chronic kidney disease, with long-term current use of insulin    11. Long-term use of immunosuppressant medication         Plan:       In summary, Ravi Zamorano is a 63 year old male with a history of persistent AF, likely symptomatic. We discussed the risks, benefits, indications, and alternatives to FELICIANO/DCCV. He expressed understanding and wishes to proceed. Post-cardioversion, a Class IC antiarrhythmic will be initiated provided his upcoming DSE is normal.    Thank you for allowing me to participate in the care of this patient. Please do not hesitate to call me with any questions or concerns.

## 2019-08-12 ENCOUNTER — INITIAL CONSULT (OUTPATIENT)
Dept: ELECTROPHYSIOLOGY | Facility: CLINIC | Age: 64
End: 2019-08-12
Payer: COMMERCIAL

## 2019-08-12 ENCOUNTER — HOSPITAL ENCOUNTER (OUTPATIENT)
Dept: CARDIOLOGY | Facility: CLINIC | Age: 64
Discharge: HOME | End: 2019-08-12
Payer: COMMERCIAL

## 2019-08-12 VITALS
WEIGHT: 203.06 LBS | BODY MASS INDEX: 26.91 KG/M2 | DIASTOLIC BLOOD PRESSURE: 70 MMHG | SYSTOLIC BLOOD PRESSURE: 122 MMHG | HEART RATE: 65 BPM | HEIGHT: 73 IN

## 2019-08-12 DIAGNOSIS — N18.2 CKD (CHRONIC KIDNEY DISEASE) STAGE 2, GFR 60-89 ML/MIN: Primary | Chronic | ICD-10-CM

## 2019-08-12 DIAGNOSIS — Z79.4 TYPE 2 DIABETES MELLITUS WITH STAGE 2 CHRONIC KIDNEY DISEASE, WITH LONG-TERM CURRENT USE OF INSULIN: ICD-10-CM

## 2019-08-12 DIAGNOSIS — Z79.60 LONG-TERM USE OF IMMUNOSUPPRESSANT MEDICATION: ICD-10-CM

## 2019-08-12 DIAGNOSIS — Z94.0 S/P KIDNEY TRANSPLANT: ICD-10-CM

## 2019-08-12 DIAGNOSIS — I50.32 CHRONIC DIASTOLIC CONGESTIVE HEART FAILURE: ICD-10-CM

## 2019-08-12 DIAGNOSIS — N18.2 TYPE 2 DIABETES MELLITUS WITH STAGE 2 CHRONIC KIDNEY DISEASE, WITH LONG-TERM CURRENT USE OF INSULIN: ICD-10-CM

## 2019-08-12 DIAGNOSIS — E78.5 HYPERLIPIDEMIA, UNSPECIFIED HYPERLIPIDEMIA TYPE: ICD-10-CM

## 2019-08-12 DIAGNOSIS — I48.19 PERSISTENT ATRIAL FIBRILLATION: ICD-10-CM

## 2019-08-12 DIAGNOSIS — E11.3393 TYPE 2 DIABETES MELLITUS WITH BOTH EYES AFFECTED BY MODERATE NONPROLIFERATIVE RETINOPATHY WITHOUT MACULAR EDEMA, WITHOUT LONG-TERM CURRENT USE OF INSULIN: ICD-10-CM

## 2019-08-12 DIAGNOSIS — I10 ESSENTIAL HYPERTENSION: ICD-10-CM

## 2019-08-12 DIAGNOSIS — E11.22 TYPE 2 DIABETES MELLITUS WITH STAGE 2 CHRONIC KIDNEY DISEASE, WITH LONG-TERM CURRENT USE OF INSULIN: ICD-10-CM

## 2019-08-12 DIAGNOSIS — E11.42 DIABETIC POLYNEUROPATHY ASSOCIATED WITH TYPE 2 DIABETES MELLITUS: ICD-10-CM

## 2019-08-12 DIAGNOSIS — Z86.19 HISTORY OF HEPATITIS C: ICD-10-CM

## 2019-08-12 PROCEDURE — 99999 PR PBB SHADOW E&M-EST. PATIENT-LVL III: ICD-10-PCS | Mod: PBBFAC,,, | Performed by: INTERNAL MEDICINE

## 2019-08-12 PROCEDURE — 93005 ELECTROCARDIOGRAM TRACING: CPT | Mod: S$GLB,,, | Performed by: INTERNAL MEDICINE

## 2019-08-12 PROCEDURE — 93010 ELECTROCARDIOGRAM REPORT: CPT | Mod: S$GLB,,, | Performed by: INTERNAL MEDICINE

## 2019-08-12 PROCEDURE — 99999 PR PBB SHADOW E&M-EST. PATIENT-LVL III: CPT | Mod: PBBFAC,,, | Performed by: INTERNAL MEDICINE

## 2019-08-12 PROCEDURE — 3045F PR MOST RECENT HEMOGLOBIN A1C LEVEL 7.0-9.0%: CPT | Mod: CPTII,S$GLB,, | Performed by: INTERNAL MEDICINE

## 2019-08-12 PROCEDURE — 99205 PR OFFICE/OUTPT VISIT, NEW, LEVL V, 60-74 MIN: ICD-10-PCS | Mod: S$GLB,,, | Performed by: INTERNAL MEDICINE

## 2019-08-12 PROCEDURE — 3078F PR MOST RECENT DIASTOLIC BLOOD PRESSURE < 80 MM HG: ICD-10-PCS | Mod: CPTII,S$GLB,, | Performed by: INTERNAL MEDICINE

## 2019-08-12 PROCEDURE — 3045F PR MOST RECENT HEMOGLOBIN A1C LEVEL 7.0-9.0%: ICD-10-PCS | Mod: CPTII,S$GLB,, | Performed by: INTERNAL MEDICINE

## 2019-08-12 PROCEDURE — 3074F SYST BP LT 130 MM HG: CPT | Mod: CPTII,S$GLB,, | Performed by: INTERNAL MEDICINE

## 2019-08-12 PROCEDURE — 3074F PR MOST RECENT SYSTOLIC BLOOD PRESSURE < 130 MM HG: ICD-10-PCS | Mod: CPTII,S$GLB,, | Performed by: INTERNAL MEDICINE

## 2019-08-12 PROCEDURE — 93010 RHYTHM STRIP: ICD-10-PCS | Mod: S$GLB,,, | Performed by: INTERNAL MEDICINE

## 2019-08-12 PROCEDURE — 93005 RHYTHM STRIP: ICD-10-PCS | Mod: S$GLB,,, | Performed by: INTERNAL MEDICINE

## 2019-08-12 PROCEDURE — 99205 OFFICE O/P NEW HI 60 MIN: CPT | Mod: S$GLB,,, | Performed by: INTERNAL MEDICINE

## 2019-08-12 PROCEDURE — 3008F BODY MASS INDEX DOCD: CPT | Mod: CPTII,S$GLB,, | Performed by: INTERNAL MEDICINE

## 2019-08-12 PROCEDURE — 3078F DIAST BP <80 MM HG: CPT | Mod: CPTII,S$GLB,, | Performed by: INTERNAL MEDICINE

## 2019-08-12 PROCEDURE — 3008F PR BODY MASS INDEX (BMI) DOCUMENTED: ICD-10-PCS | Mod: CPTII,S$GLB,, | Performed by: INTERNAL MEDICINE

## 2019-08-12 NOTE — LETTER
August 12, 2019      Howard Frias MD  [address]  [facility] [address]           [facility] - Arrhythmia  [address]  [facility] [address]  Phone: [phone]  Fax: [phone]          Patient: Ravi Zamorano   MR Number: [id number]   YOB: 1955   Date of Visit: 8/12/2019       Dear Dr. Howard Frias:    Thank you for referring Ravi Zamorano to me for evaluation. Attached you will find relevant portions of my assessment and plan of care.    If you have questions, please do not hesitate to call me. I look forward to following Ravi Zamorano along with you.    Sincerely,    Bronson Bowden MD    Enclosure  CC:  No Recipients    If you would like to receive this communication electronically, please contact [email or web address] or [phone] to request more information on [email or web address] Link access.    For providers and/or their staff who would like to refer a patient to [facility], please contact us through our one-stop-shop provider referral line, [facility], at [phone].    If you feel you have received this communication in error or would no longer like to receive these types of communications, please e-mail [email or web address]

## 2019-08-15 DIAGNOSIS — I48.19 PERSISTENT ATRIAL FIBRILLATION: Primary | ICD-10-CM

## 2019-08-15 NOTE — PROGRESS NOTES
Patient Instructions  FELICIANO (Transesophageal Echocardiogram) with Cardioversion     Pre-Procedure Testin/19/19 at 9:30 AM   Pre-procedure labs have been ordered for you at:  [facility]  · Be sure to arrive at your scheduled time. You do NOT need to fast for this blood work.     19 @ 10:30 - STRESS ECHO  Please report to Cardiology waiting area on the 3rd floor of the Clinic  Fast for 4 hours prior to ECHO      Important Medication Information:    · HOLD (do NOT take) INSULIN morning of  your procedure.  Your last dose should be taken on 19.  · Night prior to procedure take insulin as directed if taken with meal. If insulin taken at bedtime take 1/2 the dose.  · You may take all other usual morning medications with a sip of water on the day of your procedure including Eliquis.      Day of Procedure:    19 at 8 AM - FELICIANO / CARDIOVERSION  Please report to Cardiology Waiting Room on the 3rd floor of the Hospital    · Do not eat or drink anything after 12 midnight on the night before your procedure.  · Please do not wear makeup, especially mascara, when arriving for your procedure.  · You will be going home after your procedure.  · You will need someone to drive you home from the hospital due to anesthesia.       Post Procedure Testin/03/19 at 10 AM   You will have a post procedural EKG one week after your cardioversion to assess your heart rhythm and rate.       Directions to Cardiology Waiting Room  If you park in the Parking Garage:  Take elevators to the 2nd floor  Walk up ramp and turn right by Gold Elevators  Take elevator to the 3rd floor  Upon exiting the elevator, turn away from the clinic areas  Walk long vasquez around to front of hospital to area with windows overlooking [facility]  Check in at Reception Desk  OR  If family is dropping you off:  Have them drop you off at the front of the Hospital  (Near the ER, where all the flags are hung).  Take the E elevators to  the 3rd floor.  Check in at the Reception Desk in the waiting room.      Any need to reschedule or cancel procedures, or any questions regarding your procedures should be addressed directly with the Arrhythmia Department Nurses at the following phone number: [phone].

## 2019-08-19 ENCOUNTER — HOSPITAL ENCOUNTER (OUTPATIENT)
Dept: CARDIOLOGY | Facility: CLINIC | Age: 64
Discharge: HOME | End: 2019-08-19
Attending: INTERNAL MEDICINE
Payer: COMMERCIAL

## 2019-08-19 VITALS
HEART RATE: 74 BPM | RESPIRATION RATE: 18 BRPM | DIASTOLIC BLOOD PRESSURE: 78 MMHG | SYSTOLIC BLOOD PRESSURE: 138 MMHG | BODY MASS INDEX: 26.9 KG/M2 | WEIGHT: 203 LBS | HEIGHT: 73 IN

## 2019-08-19 DIAGNOSIS — I48.19 PERSISTENT ATRIAL FIBRILLATION: Primary | ICD-10-CM

## 2019-08-19 DIAGNOSIS — I48.19 PERSISTENT ATRIAL FIBRILLATION: ICD-10-CM

## 2019-08-19 LAB
ASCENDING AORTA: 3.64 CM
BSA FOR ECHO PROCEDURE: 2.18 M2
CV ECHO LV RWT: 0.44 CM
CV STRESS BASE HR: 71 BPM
DIASTOLIC BLOOD PRESSURE: 86 MMHG
DOP CALC LVOT AREA: 3.4 CM2
DOP CALC LVOT DIAMETER: 2.07 CM
DOP CALC LVOT PEAK VEL: 0.96 M/S
DOP CALC LVOT STROKE VOLUME: 64.55 CM3
DOP CALCLVOT PEAK VEL VTI: 19.19 CM
E/E' RATIO: 8.55 M/S
ECHO LV POSTERIOR WALL: 1.1 CM (ref 0.6–1.1)
FRACTIONAL SHORTENING: 30 % (ref 28–44)
INTERVENTRICULAR SEPTUM: 1.34 CM (ref 0.6–1.1)
IVRT: 0.07 MSEC
LA MAJOR: 6.41 CM
LA MINOR: 6.41 CM
LA WIDTH: 4.3 CM
LEFT ATRIUM SIZE: 4.39 CM
LEFT ATRIUM VOLUME INDEX: 47.5 ML/M2
LEFT ATRIUM VOLUME: 102.85 CM3
LEFT INTERNAL DIMENSION IN SYSTOLE: 3.5 CM (ref 2.1–4)
LEFT VENTRICLE DIASTOLIC VOLUME INDEX: 53.82 ML/M2
LEFT VENTRICLE DIASTOLIC VOLUME: 116.55 ML
LEFT VENTRICLE MASS INDEX: 109 G/M2
LEFT VENTRICLE SYSTOLIC VOLUME INDEX: 23.6 ML/M2
LEFT VENTRICLE SYSTOLIC VOLUME: 51 ML
LEFT VENTRICULAR INTERNAL DIMENSION IN DIASTOLE: 4.97 CM (ref 3.5–6)
LEFT VENTRICULAR MASS: 236.98 G
LV LATERAL E/E' RATIO: 6.71 M/S
LV SEPTAL E/E' RATIO: 11.75 M/S
MV PEAK E VEL: 0.94 M/S
OHS CV CPX 1 MINUTE RECOVERY HEART RATE: 146 BPM
OHS CV CPX 85 PERCENT MAX PREDICTED HEART RATE MALE: 135
OHS CV CPX MAX PREDICTED HEART RATE: 159
OHS CV CPX PATIENT IS FEMALE: 0
OHS CV CPX PATIENT IS MALE: 1
OHS CV CPX PEAK DIASTOLIC BLOOD PRESSURE: 78 MMHG
OHS CV CPX PEAK HEAR RATE: 146 BPM
OHS CV CPX PEAK RATE PRESSURE PRODUCT: ABNORMAL
OHS CV CPX PEAK SYSTOLIC BLOOD PRESSURE: 199 MMHG
OHS CV CPX PERCENT MAX PREDICTED HEART RATE ACHIEVED: 92
OHS CV CPX RATE PRESSURE PRODUCT PRESENTING: ABNORMAL
PISA TR MAX VEL: 2.79 M/S
RA MAJOR: 5.26 CM
RA PRESSURE: 3 MMHG
RA WIDTH: 4.17 CM
RIGHT VENTRICULAR END-DIASTOLIC DIMENSION: 3.12 CM
RV TISSUE DOPPLER FREE WALL SYSTOLIC VELOCITY 1 (APICAL 4 CHAMBER VIEW): 12.55 CM/S
SINUS: 3.17 CM
STJ: 3.48 CM
SYSTOLIC BLOOD PRESSURE: 177 MMHG
TDI LATERAL: 0.14 M/S
TDI SEPTAL: 0.08 M/S
TDI: 0.11 M/S
TR MAX PG: 31 MMHG
TRICUSPID ANNULAR PLANE SYSTOLIC EXCURSION: 1.86 CM
TV REST PULMONARY ARTERY PRESSURE: 34 MMHG

## 2019-08-19 PROCEDURE — 93320 DOPPLER ECHO COMPLETE: CPT | Mod: 26,,, | Performed by: INTERNAL MEDICINE

## 2019-08-19 PROCEDURE — 93351 STRESS TTE COMPLETE: CPT | Mod: 26,,, | Performed by: INTERNAL MEDICINE

## 2019-08-19 PROCEDURE — 93325 DOPPLER ECHO COLOR FLOW MAPG: CPT | Mod: 26,,, | Performed by: INTERNAL MEDICINE

## 2019-08-19 PROCEDURE — 93351 ECHOCARDIOGRAM STRESS TEST WITH COLOR FLOW DOPPLER (CUPID ONLY): ICD-10-PCS | Mod: 26,,, | Performed by: INTERNAL MEDICINE

## 2019-08-19 PROCEDURE — 93325 ECHOCARDIOGRAM STRESS TEST WITH COLOR FLOW DOPPLER (CUPID ONLY): ICD-10-PCS | Mod: 26,,, | Performed by: INTERNAL MEDICINE

## 2019-08-19 PROCEDURE — 93351 STRESS TTE COMPLETE: CPT

## 2019-08-19 PROCEDURE — 93320 ECHOCARDIOGRAM STRESS TEST WITH COLOR FLOW DOPPLER (CUPID ONLY): ICD-10-PCS | Mod: 26,,, | Performed by: INTERNAL MEDICINE

## 2019-08-19 RX ORDER — ATROPINE SULFATE 0.1 MG/ML
1.25 INJECTION INTRAVENOUS
Status: COMPLETED | OUTPATIENT
Start: 2019-08-19 | End: 2019-08-19

## 2019-08-19 RX ORDER — DOBUTAMINE HYDROCHLORIDE 200 MG/100ML
40 INJECTION INTRAVENOUS
Status: COMPLETED | OUTPATIENT
Start: 2019-08-19 | End: 2019-08-19

## 2019-08-19 RX ADMIN — DOBUTAMINE HYDROCHLORIDE 40 MCG/KG/MIN: 200 INJECTION INTRAVENOUS at 11:08

## 2019-08-19 RX ADMIN — ATROPINE SULFATE 1.25 MG: 0.1 INJECTION INTRAVENOUS at 11:08

## 2019-08-19 NOTE — NURSING NOTE
Patient identified by 2 identifiers. Denies previous reactions to blood transfusions, allergies reviewed & procedure explained.  24 g IV placed to Rt Hand, flushed w/ 10cc NS pre & post contrast administration.  3cc Optison administered, echo images obtained.  Pt tolerated procedure well.  IV D/C'ed, preasure dsg applied.  Pt D/C'ed to home.

## 2019-08-20 ENCOUNTER — TELEPHONE (OUTPATIENT)
Dept: OPTOMETRY | Facility: CLINIC | Age: 64
End: 2019-08-20

## 2019-08-20 NOTE — TELEPHONE ENCOUNTER
[facility] ins benefits as of 8/22/19:    Member: [employer]  YOB: 1955  Subscriber ID: [id number]  Product Name:   Plan Code: EF  Please Note: Member must be eligible at date of service to receive benefit.  In Network Coverage Frequency  Category Benefit Eligibility Frequency  Exam Available 1 every 12 month(s)  Selection Contact Lens Fit Available 1 every 12 month(s)  Non-Selection Contact Lens Fit Available 1 every 12 month(s)  Frame Available 1 every 12 month(s)  Lenses Available 1 every 12 month(s)  Selection Contact Lenses - Daily Wearï Available Every 12 month(s)  Selection Contact Lenses - Monthly Wearï Available Every 12 month(s)  Non-Selection Contact Lensesï Available Every 12 month(s)   Dilated Fundus Exam: Recommended  ï Contact Lenses are in Lieu of Eyeglasses  In Network Coverage  V[employer] Services Patient Responsibility  (includes applicable copay)  Professional Services  Exam $10.00  Non-Selection Contact Lens Fit 100% of Billed Charges  Selection Contact Lens Fit Covered-in-Full  Frames  Frame 70.00% of Balance over $130.00 Benefit Allowance  Lenses  Lenses / Blended Bifocals 80% of Billed Charges  Lenses / Free-form SV Lenses 80% of Billed Charges  Lenses / MF Aspheric Lenses 80% of Billed Charges  Lenses / Occupational Double Seg Lenses 80% of Billed Charges  Lenses / Progressive Lenses: Tier 1 (Standard) $70.00  Lenses / Progressive Lenses: Tier 2 (Deluxe) $110.00  Lenses / Progressive Lenses: Tier 3 (Premium) $150.00  Lenses / Progressive Lenses: Tier 4 (Platinum) $250.00  Lenses / Progressive Lenses: Tier 5 (Non-formulary) 80% of Billed Charges  Lenses / Standard Lenses Covered-in-Full  Lenses / SV Aspheric Lenses 80% of Billed Charges  Rendered: 8/20/2019 10:07 AM Page 1 of 2  Lens Materials  (Pricing shown is in addition to Patient Responsibility from Lens section above)  High Index 1.66 - 1.73 $63.00  High Index less than or equal to 1.66 $53.00  High Index,  >= 1.74 80% of Billed Charges  Polycarbonate Lenses Covered-in-Full for Ages 0-18  Polycarbonate Lenses $33.00 for Ages 19+  Lens Options  Edge Coating 80% of Billed Charges  Miscellaneous Lens Options 80% of Billed Charges  Non-Formulary Anti-Reflective Coating 80% of Billed Charges  One Year Scratch Warranty $10.00  Oversize Lenses 80% of Billed Charges  Photochromic $67.00  Platinum Anti-Reflective Coating $90.00  Polarized 80% of Billed Charges  Polished Edges / Roll & Polish $13.00  Premium Anti-Reflective Coating $80.00  Scratch Coating Covered-in-Full  Standard Anti-Reflective Coating $40.00  Tint $14.00  UV Coating $16.00  All other lens options - 20% off Provider's retail price  Contact Lenses  Necessary Contact Lensesï Covered-in-Full  Non-Selection Contact Lensesï 100.00% of Balance over $105.00 Benefit Allowance  Selection Contact Lenses - Daily Wearï Covered-in-Full for up to 4 Boxes  Selection Contact Lenses - Monthly Wearï Covered-in-Full for up to 2 Boxes  Please use the Selection Contact Lenses for Vision Plans Formulary. Contact lenses (including disposable lenses), the  fitting/evaluation fees, and up to two follow-up visits are covered-in-full up to the maximum allowed in a benefit year.

## 2019-08-21 ENCOUNTER — TELEPHONE (OUTPATIENT)
Dept: ELECTROPHYSIOLOGY | Facility: CLINIC | Age: 64
End: 2019-08-21

## 2019-08-21 NOTE — TELEPHONE ENCOUNTER
Pt notified per Dr. Bowden's message below; pt verbalized understanding & thanked me for calling.

## 2019-08-21 NOTE — TELEPHONE ENCOUNTER
----- Message from Bronson Bowden MD sent at 8/20/2019  4:43 PM CDT -----  Please let pt know his stress test was normal.    Thanks GP

## 2019-08-21 NOTE — TELEPHONE ENCOUNTER
Spoke to  Mr. Zamorano    CONFIRMED procedure arrival time of 8am on 8/26  Reiterated instructions including:  -Directions to check in desk  -NPO after midnight night prior to procedure  -High importance of HOLDING Insulin on the day of the procedure and 1/2 dose of insulin the night before  -Confirmed compliance of Eliquis  -Pre-procedure LABS 8/19       Pt verbalizes understanding of above and appreciates call.

## 2019-08-26 ENCOUNTER — HOSPITAL ENCOUNTER (OUTPATIENT)
Facility: HOSPITAL | Age: 64
Discharge: HOME | End: 2019-08-26
Attending: INTERNAL MEDICINE | Admitting: INTERNAL MEDICINE
Payer: COMMERCIAL

## 2019-08-26 ENCOUNTER — ANESTHESIA (OUTPATIENT)
Dept: MEDSURG UNIT | Facility: HOSPITAL | Age: 64
End: 2019-08-26
Payer: COMMERCIAL

## 2019-08-26 ENCOUNTER — TELEPHONE (OUTPATIENT)
Dept: INTERNAL MEDICINE | Facility: CLINIC | Age: 64
End: 2019-08-26

## 2019-08-26 ENCOUNTER — ANESTHESIA EVENT (OUTPATIENT)
Dept: MEDSURG UNIT | Facility: HOSPITAL | Age: 64
End: 2019-08-26
Payer: COMMERCIAL

## 2019-08-26 ENCOUNTER — APPOINTMENT (OUTPATIENT)
Dept: CARDIOLOGY | Facility: CLINIC | Age: 64
End: 2019-08-26
Payer: COMMERCIAL

## 2019-08-26 VITALS
OXYGEN SATURATION: 100 % | DIASTOLIC BLOOD PRESSURE: 79 MMHG | SYSTOLIC BLOOD PRESSURE: 154 MMHG | TEMPERATURE: 98 F | BODY MASS INDEX: 26.9 KG/M2 | RESPIRATION RATE: 18 BRPM | HEIGHT: 73 IN | WEIGHT: 203 LBS | HEART RATE: 59 BPM

## 2019-08-26 DIAGNOSIS — I48.19 PERSISTENT ATRIAL FIBRILLATION: Primary | ICD-10-CM

## 2019-08-26 DIAGNOSIS — I48.91 ATRIAL FIBRILLATION: ICD-10-CM

## 2019-08-26 LAB
BSA FOR ECHO PROCEDURE: 2.18 M2
PISA TR MAX VEL: 2 M/S
POCT GLUCOSE: 172 MG/DL (ref 70–110)
POCT GLUCOSE: 184 MG/DL (ref 70–110)
SINUS: 3.7 CM
STJ: 2.9 CM
TR MAX PG: 16 MMHG

## 2019-08-26 PROCEDURE — 93010 EKG 12-LEAD: ICD-10-PCS | Mod: ,,, | Performed by: INTERNAL MEDICINE

## 2019-08-26 PROCEDURE — 37000009 HC ANESTHESIA EA ADD 15 MINS: Performed by: INTERNAL MEDICINE

## 2019-08-26 PROCEDURE — 25000003 PHARM REV CODE 250: Performed by: INTERNAL MEDICINE

## 2019-08-26 PROCEDURE — 92960 PR CARDIOVERSION, ELECTIVE;EXTERN: ICD-10-PCS | Mod: ,,, | Performed by: INTERNAL MEDICINE

## 2019-08-26 PROCEDURE — 93005 ELECTROCARDIOGRAM TRACING: CPT | Mod: 59

## 2019-08-26 PROCEDURE — 93312 TRANSESOPHAGEAL ECHO (TEE) (CUPID ONLY): ICD-10-PCS | Mod: 26,,, | Performed by: INTERNAL MEDICINE

## 2019-08-26 PROCEDURE — 25000003 PHARM REV CODE 250: Performed by: NURSE ANESTHETIST, CERTIFIED REGISTERED

## 2019-08-26 PROCEDURE — D9220A PRA ANESTHESIA: Mod: CRNA,,, | Performed by: NURSE ANESTHETIST, CERTIFIED REGISTERED

## 2019-08-26 PROCEDURE — 93320 TRANSESOPHAGEAL ECHO (TEE) (CUPID ONLY): ICD-10-PCS | Mod: 26,,, | Performed by: INTERNAL MEDICINE

## 2019-08-26 PROCEDURE — 82962 GLUCOSE BLOOD TEST: CPT | Mod: 91 | Performed by: INTERNAL MEDICINE

## 2019-08-26 PROCEDURE — 93010 ELECTROCARDIOGRAM REPORT: CPT | Mod: 76,,, | Performed by: INTERNAL MEDICINE

## 2019-08-26 PROCEDURE — 63600175 PHARM REV CODE 636 W HCPCS: Performed by: NURSE ANESTHETIST, CERTIFIED REGISTERED

## 2019-08-26 PROCEDURE — 37000008 HC ANESTHESIA 1ST 15 MINUTES: Performed by: INTERNAL MEDICINE

## 2019-08-26 PROCEDURE — D9220A PRA ANESTHESIA: ICD-10-PCS | Mod: ANES,,, | Performed by: ANESTHESIOLOGY

## 2019-08-26 PROCEDURE — 92960 CARDIOVERSION ELECTRIC EXT: CPT | Performed by: INTERNAL MEDICINE

## 2019-08-26 PROCEDURE — 93325 DOPPLER ECHO COLOR FLOW MAPG: CPT

## 2019-08-26 PROCEDURE — 93312 ECHO TRANSESOPHAGEAL: CPT | Mod: 26,,, | Performed by: INTERNAL MEDICINE

## 2019-08-26 PROCEDURE — 93320 DOPPLER ECHO COMPLETE: CPT | Mod: 26,,, | Performed by: INTERNAL MEDICINE

## 2019-08-26 PROCEDURE — 92960 CARDIOVERSION ELECTRIC EXT: CPT | Mod: ,,, | Performed by: INTERNAL MEDICINE

## 2019-08-26 PROCEDURE — 93325 TRANSESOPHAGEAL ECHO (TEE) (CUPID ONLY): ICD-10-PCS | Mod: 26,,, | Performed by: INTERNAL MEDICINE

## 2019-08-26 PROCEDURE — 93325 DOPPLER ECHO COLOR FLOW MAPG: CPT | Mod: 26,,, | Performed by: INTERNAL MEDICINE

## 2019-08-26 PROCEDURE — 82962 GLUCOSE BLOOD TEST: CPT

## 2019-08-26 PROCEDURE — D9220A PRA ANESTHESIA: Mod: ANES,,, | Performed by: ANESTHESIOLOGY

## 2019-08-26 PROCEDURE — 93010 ELECTROCARDIOGRAM REPORT: CPT | Mod: ,,, | Performed by: INTERNAL MEDICINE

## 2019-08-26 PROCEDURE — D9220A PRA ANESTHESIA: ICD-10-PCS | Mod: CRNA,,, | Performed by: NURSE ANESTHETIST, CERTIFIED REGISTERED

## 2019-08-26 RX ORDER — SODIUM CHLORIDE 9 MG/ML
INJECTION, SOLUTION INTRAVENOUS CONTINUOUS PRN
Status: DISCONTINUED | OUTPATIENT
Start: 2019-08-26 | End: 2019-08-26

## 2019-08-26 RX ORDER — SILVER SULFADIAZINE 10 G/1000G
CREAM TOPICAL
Status: DISCONTINUED | OUTPATIENT
Start: 2019-08-26 | End: 2019-08-26 | Stop reason: HOSPADM

## 2019-08-26 RX ORDER — NIFEDIPINE 30 MG/1
30 TABLET, EXTENDED RELEASE ORAL DAILY
Qty: 30 TABLET | Refills: 11 | Status: SHIPPED | OUTPATIENT
Start: 2019-08-26 | End: 2020-08-19 | Stop reason: SDUPTHER

## 2019-08-26 RX ORDER — HYDROMORPHONE HYDROCHLORIDE 1 MG/ML
0.2 INJECTION, SOLUTION INTRAMUSCULAR; INTRAVENOUS; SUBCUTANEOUS EVERY 5 MIN PRN
Status: CANCELLED | OUTPATIENT
Start: 2019-08-26

## 2019-08-26 RX ORDER — LIDOCAINE HCL/PF 100 MG/5ML
SYRINGE (ML) INTRAVENOUS
Status: DISCONTINUED | OUTPATIENT
Start: 2019-08-26 | End: 2019-08-26

## 2019-08-26 RX ORDER — FLECAINIDE ACETATE 100 MG/1
100 TABLET ORAL EVERY 12 HOURS
Qty: 60 TABLET | Refills: 3 | Status: SHIPPED | OUTPATIENT
Start: 2019-08-26 | End: 2019-11-27 | Stop reason: SDUPTHER

## 2019-08-26 RX ORDER — LIDOCAINE HYDROCHLORIDE 20 MG/ML
SOLUTION OROPHARYNGEAL
Status: DISCONTINUED | OUTPATIENT
Start: 2019-08-26 | End: 2019-08-26

## 2019-08-26 RX ORDER — PROPOFOL 10 MG/ML
VIAL (ML) INTRAVENOUS CONTINUOUS PRN
Status: DISCONTINUED | OUTPATIENT
Start: 2019-08-26 | End: 2019-08-26

## 2019-08-26 RX ORDER — FENTANYL CITRATE 50 UG/ML
25 INJECTION, SOLUTION INTRAMUSCULAR; INTRAVENOUS EVERY 5 MIN PRN
Status: CANCELLED | OUTPATIENT
Start: 2019-08-26

## 2019-08-26 RX ORDER — PROPOFOL 10 MG/ML
VIAL (ML) INTRAVENOUS
Status: DISCONTINUED | OUTPATIENT
Start: 2019-08-26 | End: 2019-08-26

## 2019-08-26 RX ORDER — DIPHENHYDRAMINE HYDROCHLORIDE 50 MG/ML
25 INJECTION INTRAMUSCULAR; INTRAVENOUS EVERY 6 HOURS PRN
Status: CANCELLED | OUTPATIENT
Start: 2019-08-26

## 2019-08-26 RX ADMIN — LIDOCAINE HYDROCHLORIDE 100 MG: 20 INJECTION, SOLUTION INTRAVENOUS at 09:08

## 2019-08-26 RX ADMIN — LIDOCAINE HYDROCHLORIDE 4 ML: 20 SOLUTION ORAL; TOPICAL at 09:08

## 2019-08-26 RX ADMIN — PROPOFOL 150 MCG/KG/MIN: 10 INJECTION, EMULSION INTRAVENOUS at 09:08

## 2019-08-26 RX ADMIN — PROPOFOL 60 MG: 10 INJECTION, EMULSION INTRAVENOUS at 09:08

## 2019-08-26 RX ADMIN — SODIUM CHLORIDE: 9 INJECTION, SOLUTION INTRAVENOUS at 09:08

## 2019-08-26 NOTE — SUBJECTIVE & OBJECTIVE
Past Medical History:   Diagnosis Date    Anxiety 7/29/2014    Arthritis     CKD (chronic kidney disease) stage 2, GFR 60-89 ml/min 12/28/2016    CKD (chronic kidney disease) stage 4, GFR 15-29 ml/min 7/29/2014    Depression - situational 7/29/2014    Diabetes mellitus     Diabetes type 2 since 2000 7/29/2014    Diabetic neuropathy 7/29/2014    History of hepatitis C, s/p successful Rx w/ SVR24 - 9/2017 7/29/2014    Genotype 1a, treatment naive 10/2014 liver biopsy - grade 1 / stage 1 Completed Harvoni w/ SVR    Hyperlipidemia 7/29/2014    Hypertension     Neuropathy     Organ transplant candidate 7/29/2014    Pancreatitis        Past Surgical History:   Procedure Laterality Date    APPENDECTOMY      BIOPSY LIVER AND ULTRASOUND N/A 10/3/2014    Performed by [facility] at [facility] OR 2ND FLR    COLONOSCOPY N/A 9/16/2014    Performed by Montez Saunders MD at [facility] ENDO (4TH FLR)    KIDNEY TRANSPLANT      REMOVAL-CATHETER-PERITONEAL DIALYSIS  11/5/2016    Performed by Constantino Gillette MD at [facility] OR 2ND FLR    TRANSPLANT-KIDNEY N/A 11/5/2016    Performed by Constantino Gillette MD at [facility] OR 2ND FLR       Review of patient's allergies indicates:  No Known Allergies    No current facility-administered medications on file prior to encounter.      Current Outpatient Medications on File Prior to Encounter   Medication Sig    apixaban (ELIQUIS) 5 mg Tab Take 1 tablet (5 mg total) by mouth 2 (two) times daily.    aspirin 81 MG Chew Take 81 mg by mouth once daily.    atorvastatin (LIPITOR) 40 MG tablet Take 1 tablet (40 mg total) by mouth once daily.    carvedilol (COREG) 25 MG tablet Take 1 tablet (25 mg total) by mouth 2 (two) times daily. (Patient taking differently: Take 25 mg by mouth 2 (two) times daily. )    famotidine (PEPCID) 20 MG tablet Take 1 tablet (20 mg total) by mouth every evening.    gabapentin (NEURONTIN) 300 MG capsule Take 1 tablet in the morning; 1 tablet midday and 3 tablets at  "night.    hydrALAZINE (APRESOLINE) 25 MG tablet Take 1 tablet (25 mg total) by mouth 2 (two) times daily.    insulin detemir U-100 (LEVEMIR FLEXTOUCH) 100 unit/mL (3 mL) SubQ InPn pen Inject 35 Units into the skin every evening.    insulin lispro (HUMALOG KWIKPEN INSULIN) 100 unit/mL pen Inject 18 units w/ breakfast, 16 units w/ lunch and dinner plus scale 150-200 +2, 201-250 +4, 251-300 +6, 301-350 +8.    melatonin 5 mg Tab Take 1 tablet (5 mg total) by mouth every evening.    mycophenolate (CELLCEPT) 250 mg Cap Take 4 capsules (1,000 mg total) by mouth 2 (two) times daily.    predniSONE (DELTASONE) 5 MG tablet Take 1 tablet (5 mg total) by mouth once daily.    tacrolimus (PROGRAF) 0.5 MG Cap Take 1mg (2 capsules)  orally in the am and 1 mg (2 capsule) orally in the pm    blood sugar diagnostic Strp 1 strip by Misc.(Non-Drug; Combo Route) route 4 (four) times daily before meals and nightly. e11.65    blood sugar diagnostic Strp test blood sugar four times daily    blood-glucose meter (FREESTYLE SYSTEM KIT) kit Use as instructed e11.65 may substitute meter as covered by insurance    lancets Misc 1 each by Misc.(Non-Drug; Combo Route) route 4 (four) times daily before meals and nightly. e11.65    meclizine (ANTIVERT) 25 mg tablet Take 1 tablet (25 mg total) by mouth 3 (three) times daily as needed for Dizziness.    pen needle, diabetic 32 gauge x 5/32" Ndle USE TO ADMINISTER INSULIN 4 TIMES DAILY.     Family History     Problem Relation (Age of Onset)    Cancer Brother    Diabetes Mother    Heart disease Mother, Father    Hypertension Mother, Brother        Tobacco Use    Smoking status: Former Smoker     Packs/day: 0.50     Years: 32.00     Pack years: 16.00     Types: Cigarettes     Last attempt to quit: 2016     Years since quittin.7    Smokeless tobacco: Never Used    Tobacco comment: Pt reports that he quit in , but started up again in . pt reports he is currently working on " quitting again   Substance and Sexual Activity    Alcohol use: Yes     Comment: Pt reports occasional beers on Sundays. Pt reports drinking daily prior to ESRD diagnosis.    Drug use: No    Sexual activity: Yes     Partners: Female     Review of Systems   Constitution: Negative for chills, decreased appetite and diaphoresis.   HENT: Negative for congestion and ear discharge.    Eyes: Negative for blurred vision and discharge.   Cardiovascular: Negative for chest pain, dyspnea on exertion, irregular heartbeat, leg swelling and paroxysmal nocturnal dyspnea.   Respiratory: Negative for cough, hemoptysis and shortness of breath.    Gastrointestinal: Negative for abdominal pain.     Objective:     Vital Signs (Most Recent):  Temp: 97.3 °F (36.3 °C) (08/26/19 0731)  Pulse: 73 (08/26/19 0731)  Resp: 18 (08/26/19 0731)  BP: 120/77 (08/26/19 0732)  SpO2: 100 % (08/26/19 0731) Vital Signs (24h Range):  Temp:  [97.3 °F (36.3 °C)] 97.3 °F (36.3 °C)  Pulse:  [73] 73  Resp:  [18] 18  SpO2:  [100 %] 100 %  BP: (120-136)/(77) 120/77     Weight: 92.1 kg (203 lb)  Body mass index is 26.78 kg/m².    SpO2: 100 %  O2 Device (Oxygen Therapy): room air    No intake or output data in the 24 hours ending 08/26/19 0835    Lines/Drains/Airways     Drain                 Closed/Suction Drain 11/05/16 2021 Right Abdomen Bulb 15 Fr. 1023 days          Arterial Line                 Arterial Line 11/05/16 1910 Right 1023 days          Peripheral Intravenous Line                 Peripheral IV - Single Lumen 03/15/19 1837 Left Antecubital 163 days         Peripheral IV - Single Lumen 08/26/19 0754 20 G Right Hand less than 1 day                Physical Exam   Constitutional: He is oriented to person, place, and time. He appears well-developed and well-nourished. No distress.   Eyes: Pupils are equal, round, and reactive to light. Conjunctivae are normal.   Neck: No tracheal deviation present. No thyromegaly present.   Cardiovascular: Normal rate,  normal heart sounds and intact distal pulses. An irregularly irregular rhythm present. Exam reveals no gallop and no friction rub.   No murmur heard.  Pulses:       Radial pulses are 2+ on the right side, and 2+ on the left side.        Femoral pulses are 2+ on the right side, and 2+ on the left side.  Pulmonary/Chest: Effort normal and breath sounds normal. No respiratory distress. He has no wheezes. He has no rales.   Abdominal: Soft. Bowel sounds are normal. He exhibits no distension. There is no tenderness.   Musculoskeletal: He exhibits no edema or deformity.   Neurological: He is alert and oriented to person, place, and time. No cranial nerve deficit. Coordination normal.   Skin: Skin is warm and dry. He is not diaphoretic.   Psychiatric: He has a normal mood and affect. His behavior is normal.       Significant Labs: BMP: No results for input(s): GLU, NA, K, CL, CO2, BUN, CREATININE, CALCIUM, MG in the last 48 hours.    Significant Imaging: Echocardiogram:   Transthoracic echo (TTE) complete (Cupid Only):   Results for orders placed or performed during the hospital encounter of 08/02/19   Transthoracic echo (TTE) 2D with Color Flow   Result Value Ref Range    Ascending aorta 3.89 cm    STJ 2.85 cm    AV mean gradient 2 mmHg    Ao peak finesse 1.07 m/s    Ao VTI 18.65 cm    IVS 1.14 (A) 0.6 - 1.1 cm    LA size 4.81 cm    Left Atrium Major Axis 7.01 cm    Left Atrium Minor Axis 6.10 cm    LVIDD 5.36 3.5 - 6.0 cm    LVIDS 3.72 2.1 - 4.0 cm    LVOT diameter 2.14 cm    LVOT peak VTI 18.02 cm    PW 0.97 0.6 - 1.1 cm    MV Peak E Finesse 1.11 m/s    RA Major Axis 5.74 cm    RA Width 4.52 cm    RVDD 4.62 cm    Sinus 3.47 cm    TAPSE 1.41 cm    TR Max Finesse 2.30 m/s    TDI LATERAL 0.13 m/s    TDI SEPTAL 0.07 m/s    LA WIDTH 4.41 cm    LV Diastolic Volume 139.09 mL    LV Systolic Volume 58.94 mL    RV S' 9.82 cm/s    LVOT peak finesse 0.90 m/s    LV LATERAL E/E' RATIO 8.54 m/s    LV SEPTAL E/E' RATIO 15.86 m/s    FS 31 %    LA  volume 117.62 cm3    LV mass 219.28 g    Left Ventricle Relative Wall Thickness 0.36 cm    AV valve area 3.47 cm2    AV Velocity Ratio 0.84     AV index (prosthetic) 0.97     Mean e' 0.10 m/s    LVOT area 3.6 cm2    LVOT stroke volume 64.78 cm3    AV peak gradient 5 mmHg    E/E' ratio 11.10 m/s    Triscuspid Valve Regurgitation Peak Gradient 21 mmHg    BSA 2.18 m2    LV Systolic Volume Index 27.2 mL/m2    LV Diastolic Volume Index 64.23 mL/m2    LA Volume Index 54.3 mL/m2    LV Mass Index 101 g/m2    Right Atrial Pressure (from IVC) 3 mmHg    TV rest pulmonary artery pressure 24 mmHg    Narrative    · Normal left ventricular systolic function. The estimated ejection   fraction is 55%  · Mild right ventricular enlargement.  · Mildly reduced right ventricular systolic function.  · The ascending aorta is mildly dilated.  · Severe left atrial enlargement.  · Moderate right atrial enlargement.  · Mild mitral regurgitation.  · The estimated PA systolic pressure is 24 mm Hg  · Normal central venous pressure (3 mm Hg).

## 2019-08-26 NOTE — ANESTHESIA PREPROCEDURE EVALUATION
08/26/2019  Ravi Zamorano is a 63 y.o., male.  Patient Active Problem List   Diagnosis    Type 2 diabetes mellitus with stage 2 chronic kidney disease, with long-term current use of insulin    Diabetic polyneuropathy associated with type 2 diabetes mellitus    Hypertension since 2000    Hyperlipidemia    Depression - situational    Anxiety    History of hepatitis C, s/p successful Rx w/ SVR24 - 9/2017    Prophylactic immunotherapy    SOB (shortness of breath)    Chronic diastolic congestive heart failure    Hyperparathyroidism due to renal insufficiency    S/P cadaveric kidney transplant 11/5/2016. ESRD secondary to HTN/DMII    Adverse effect of glucocorticoid or synthetic analogue    Adverse effect of immunosuppressive drug    Long-term use of immunosuppressant medication    Anemia of chronic disease    Hypophosphatemia    Smoker    CKD (chronic kidney disease) stage 2, GFR 60-89 ml/min    Prostate cancer screening    Nocturia more than twice per night    Type 2 diabetes mellitus with both eyes affected by moderate nonproliferative retinopathy without macular edema, without long-term current use of insulin    Hypertensive urgency    Epigastric pain    Persistent atrial fibrillation         Anesthesia Evaluation         Review of Systems      Physical Exam  General:  Well nourished    Airway/Jaw/Neck:  Airway Findings: Mouth Opening: Normal Tongue: Normal  General Airway Assessment: Adult  Mallampati: II  Improves to II with phonation.  TM Distance: Normal, at least 6 cm      Dental:  Dental Findings: In tact   Chest/Lungs:  Chest/Lungs Findings: Clear to auscultation     Heart/Vascular:  Heart Findings: Rate: Normal  Rhythm: Regular Rhythm  Sounds: Normal        Mental Status:  Mental Status Findings:  Cooperative, Alert and Oriented         Anesthesia Plan  Type of Anesthesia,  risks & benefits discussed:  Anesthesia Type:  general  Patient's Preference: General  Intra-op Monitoring Plan: standard ASA monitors  Intra-op Monitoring Plan Comments: Standard ASA monitors.   Post Op Pain Control Plan: per primary service following discharge from PACU  Post Op Pain Control Plan Comments: Per primary service.     Induction:   IV  Beta Blocker:  Patient is not currently on a Beta-Blocker (No further documentation required).       Informed Consent: Patient understands risks and agrees with Anesthesia plan.  Questions answered. Anesthesia consent signed with patient.  ASA Score: 4     Day of Surgery Review of History & Physical:    H&P update referred to the surgeon.     Anesthesia Plan Notes: Chart reviewed, patient interviewed and examined.  The plan for general anesthesia was explained.  Questions were answered and the consent was signed.  Paulette DUMONT         Ready For Surgery From Anesthesia Perspective.

## 2019-08-26 NOTE — PLAN OF CARE
Problem: Adult Inpatient Plan of Care  Goal: Plan of Care Review  Outcome: Ongoing (interventions implemented as appropriate)  Received report from HE Nelson. Patient s/p FELICIANO/Cardioversion, AAOx4. VSS, no c/o pain or discomfort at this time, resp even and unlabored. Post procedure protocol reviewed with patient and patient's wife. Understanding verbalized. wife at bedside. Nurse call bell within reach. Will continue to monitor per post procedure protocol.

## 2019-08-26 NOTE — TELEPHONE ENCOUNTER
----- Message from Erick Squires sent at 8/26/2019  8:32 AM CDT -----  Contact: self/[phone]/ [phone]  Pt is calling to get a new prescription written for NIFEdipine (PROCARDIA-XL) 30 MG (OSM) 24 hr tablet. Pt states that they told him it's been a year and needs to be a new prescription written for this med. Pt needs it sent to [facility]. Please call and advise.        Thank You

## 2019-08-26 NOTE — NURSING TRANSFER
Nursing Transfer Note      8/26/2019     Transfer To: 7a    Transfer via stretcher    Transfer with cardiac monitoring    Transported by escort with ticket to ride    Medicines sent: silvadene cream    Chart send with patient: Yes    Notified: spouse    Patient reassessed at: see epic and reported to sscu nurse (date, time)    Upon arrival to floor: to room no complaints no distress noted.

## 2019-08-26 NOTE — PROGRESS NOTES
Patient admitted to recovery see [facility] for complete assessment pacu bcg's maintained safety measures verified patient instructed on pain scale and patient verbalized understanding. Called for patient's ekg and it was done and placed in patient's chart also called patient's wife and updated on patient location with the permission of patient. Also patient's blood sugar 184,

## 2019-08-26 NOTE — ASSESSMENT & PLAN NOTE
1. FELICIANO for evaluation of SHERRILL for DCCV  -No absolute contraindications of esophageal stricture, tumor, perforation, laceration,or diverticulum and/or active GI bleed  -The risks, benefits & alternatives of the procedure were explained to the patient.   -The risks of transesophageal echo include but are not limited to:  Dental trauma, esophageal trauma/perforation, bleeding, laryngospasm/brochospasm, aspiration, sore throat/hoarseness, & dislodgement of the endotracheal tube/nasogastric tube (where applicable).    -The risks of moderate sedation include hypotension, respiratory depression, arrhythmias, bronchospasm, & death.    -Informed consent was obtained. The patient is agreeable to proceed with the procedure and all questions and concerns addressed.    Case discussed with an attending in echocardiography lab.     Further recommendations per attending addendum

## 2019-08-26 NOTE — PLAN OF CARE
Problem: Adult Inpatient Plan of Care  Goal: Plan of Care Review  Outcome: Outcome(s) achieved Date Met: 08/26/19  Patient discharged per MD orders. Instructions given on medications, wound care, activity, signs of infection, when to call MD, and follow up appointments. Pt verbalized understanding. Patient AAOx4, VSS, no c/o pain or discomfort at this time. Telemetry and PIV removed. Patient declined wheelchiair with escort and ambulated off unit.

## 2019-08-26 NOTE — H&P
[facility]  Cardiology  History and Physical     Patient Name: Ravi Zamorano  MRN: [id number]  Admission Date: 8/26/2019  Code Status: Prior   Attending Provider: Bronson Bowden MD   Primary Care Physician: Mima Mack MD  Principal Problem:<principal problem not specified>    Patient information was obtained from patient and ER records.     Subjective:     Chief Complaint:  Afib     HPI:  63 year old M with a history of DM on insulin, CKD2, history of kidney transplant in 2016 (Cr 1.2 in 8/2019), and diastolic dysfunction, who has a several month history of DOSS. He was seen in cardiology clinic (8/1/2019) where an ECG showed rate controlled AF (last ECG in the EMR from 3/15/2019 shows sinus rhythm). He was placed on Eliquis 5 mg bid, and presents for FELICIANO/DCCV    DSE 8/19    · Concentric left ventricular remodeling. Normal left ventricular systolic function. The estimated ejection fraction is 60%  · Moderate left atrial enlargement.  · Normal LV diastolic function.  · Normal right ventricular systolic function  · The EKG portion of this study is negative for myocardial ischemia.  · The stress echo portion of this study is negative for myocardial ischemia.     Dysphagia or odynophagia:  No  Liver Disease, esophageal disease, or known varices:  No  Upper GI Bleeding: No  Snoring:  No  Sleep Apnea:  No  Prior neck surgery or radiation:  No  History of anesthetic difficulties:  No  Family history of anesthetic difficulties:  No  Last oral intake:  12 hours ago  Able to move neck in all directions:  Yes        Past Medical History:   Diagnosis Date    Anxiety 7/29/2014    Arthritis     CKD (chronic kidney disease) stage 2, GFR 60-89 ml/min 12/28/2016    CKD (chronic kidney disease) stage 4, GFR 15-29 ml/min 7/29/2014    Depression - situational 7/29/2014    Diabetes mellitus     Diabetes type 2 since 2000 7/29/2014    Diabetic neuropathy 7/29/2014    History of hepatitis C, s/p  successful Rx w/ SVR24 - 9/2017 7/29/2014    Genotype 1a, treatment naive 10/2014 liver biopsy - grade 1 / stage 1 Completed Harvoni w/ SVR    Hyperlipidemia 7/29/2014    Hypertension     Neuropathy     Organ transplant candidate 7/29/2014    Pancreatitis        Past Surgical History:   Procedure Laterality Date    APPENDECTOMY      BIOPSY LIVER AND ULTRASOUND N/A 10/3/2014    Performed by [facility] OR 2ND FLR    COLONOSCOPY N/A 9/16/2014    Performed by Montez Saunders MD at [facility] ENDO (4TH FLR)    KIDNEY TRANSPLANT      REMOVAL-CATHETER-PERITONEAL DIALYSIS  11/5/2016    Performed by Constantino Gillette MD at [facility] OR 2ND FLR    TRANSPLANT-KIDNEY N/A 11/5/2016    Performed by Constantino Gillette MD at [facility] OR 2ND FLR       Review of patient's allergies indicates:  No Known Allergies    No current facility-administered medications on file prior to encounter.      Current Outpatient Medications on File Prior to Encounter   Medication Sig    apixaban (ELIQUIS) 5 mg Tab Take 1 tablet (5 mg total) by mouth 2 (two) times daily.    aspirin 81 MG Chew Take 81 mg by mouth once daily.    atorvastatin (LIPITOR) 40 MG tablet Take 1 tablet (40 mg total) by mouth once daily.    carvedilol (COREG) 25 MG tablet Take 1 tablet (25 mg total) by mouth 2 (two) times daily. (Patient taking differently: Take 25 mg by mouth 2 (two) times daily. )    famotidine (PEPCID) 20 MG tablet Take 1 tablet (20 mg total) by mouth every evening.    gabapentin (NEURONTIN) 300 MG capsule Take 1 tablet in the morning; 1 tablet midday and 3 tablets at night.    hydrALAZINE (APRESOLINE) 25 MG tablet Take 1 tablet (25 mg total) by mouth 2 (two) times daily.    insulin detemir U-100 (LEVEMIR FLEXTOUCH) 100 unit/mL (3 mL) SubQ InPn pen Inject 35 Units into the skin every evening.    insulin lispro (HUMALOG KWIKPEN INSULIN) 100 unit/mL pen Inject 18 units w/ breakfast, 16 units w/ lunch and dinner plus scale 150-200 +2, 201-250  "+4, 251-300 +6, 301-350 +8.    melatonin 5 mg Tab Take 1 tablet (5 mg total) by mouth every evening.    mycophenolate (CELLCEPT) 250 mg Cap Take 4 capsules (1,000 mg total) by mouth 2 (two) times daily.    predniSONE (DELTASONE) 5 MG tablet Take 1 tablet (5 mg total) by mouth once daily.    tacrolimus (PROGRAF) 0.5 MG Cap Take 1mg (2 capsules)  orally in the am and 1 mg (2 capsule) orally in the pm    blood sugar diagnostic Strp 1 strip by Misc.(Non-Drug; Combo Route) route 4 (four) times daily before meals and nightly. e11.65    blood sugar diagnostic Strp test blood sugar four times daily    blood-glucose meter (FREESTYLE SYSTEM KIT) kit Use as instructed e11.65 may substitute meter as covered by insurance    lancets Misc 1 each by Misc.(Non-Drug; Combo Route) route 4 (four) times daily before meals and nightly. e11.65    meclizine (ANTIVERT) 25 mg tablet Take 1 tablet (25 mg total) by mouth 3 (three) times daily as needed for Dizziness.    pen needle, diabetic 32 gauge x 5/32" Ndle USE TO ADMINISTER INSULIN 4 TIMES DAILY.     Family History     Problem Relation (Age of Onset)    Cancer Brother    Diabetes Mother    Heart disease Mother, Father    Hypertension Mother, Brother        Tobacco Use    Smoking status: Former Smoker     Packs/day: 0.50     Years: 32.00     Pack years: 16.00     Types: Cigarettes     Last attempt to quit: 2016     Years since quittin.7    Smokeless tobacco: Never Used    Tobacco comment: Pt reports that he quit in , but started up again in . pt reports he is currently working on quitting again   Substance and Sexual Activity    Alcohol use: Yes     Comment: Pt reports occasional beers on Sundays. Pt reports drinking daily prior to ESRD diagnosis.    Drug use: No    Sexual activity: Yes     Partners: Female     Review of Systems   Constitution: Negative for chills, decreased appetite and diaphoresis.   HENT: Negative for congestion and ear discharge.  "   Eyes: Negative for blurred vision and discharge.   Cardiovascular: Negative for chest pain, dyspnea on exertion, irregular heartbeat, leg swelling and paroxysmal nocturnal dyspnea.   Respiratory: Negative for cough, hemoptysis and shortness of breath.    Gastrointestinal: Negative for abdominal pain.     Objective:     Vital Signs (Most Recent):  Temp: 97.3 °F (36.3 °C) (08/26/19 0731)  Pulse: 73 (08/26/19 0731)  Resp: 18 (08/26/19 0731)  BP: 120/77 (08/26/19 0732)  SpO2: 100 % (08/26/19 0731) Vital Signs (24h Range):  Temp:  [97.3 °F (36.3 °C)] 97.3 °F (36.3 °C)  Pulse:  [73] 73  Resp:  [18] 18  SpO2:  [100 %] 100 %  BP: (120-136)/(77) 120/77     Weight: 92.1 kg (203 lb)  Body mass index is 26.78 kg/m².    SpO2: 100 %  O2 Device (Oxygen Therapy): room air    No intake or output data in the 24 hours ending 08/26/19 0835    Lines/Drains/Airways     Drain                 Closed/Suction Drain 11/05/16 2021 Right Abdomen Bulb 15 Fr. 1023 days          Arterial Line                 Arterial Line 11/05/16 1910 Right 1023 days          Peripheral Intravenous Line                 Peripheral IV - Single Lumen 03/15/19 1837 Left Antecubital 163 days         Peripheral IV - Single Lumen 08/26/19 0754 20 G Right Hand less than 1 day                Physical Exam   Constitutional: He is oriented to person, place, and time. He appears well-developed and well-nourished. No distress.   Eyes: Pupils are equal, round, and reactive to light. Conjunctivae are normal.   Neck: No tracheal deviation present. No thyromegaly present.   Cardiovascular: Normal rate, normal heart sounds and intact distal pulses. An irregularly irregular rhythm present. Exam reveals no gallop and no friction rub.   No murmur heard.  Pulses:       Radial pulses are 2+ on the right side, and 2+ on the left side.        Femoral pulses are 2+ on the right side, and 2+ on the left side.  Pulmonary/Chest: Effort normal and breath sounds normal. No respiratory  distress. He has no wheezes. He has no rales.   Abdominal: Soft. Bowel sounds are normal. He exhibits no distension. There is no tenderness.   Musculoskeletal: He exhibits no edema or deformity.   Neurological: He is alert and oriented to person, place, and time. No cranial nerve deficit. Coordination normal.   Skin: Skin is warm and dry. He is not diaphoretic.   Psychiatric: He has a normal mood and affect. His behavior is normal.       Significant Labs: BMP: No results for input(s): GLU, NA, K, CL, CO2, BUN, CREATININE, CALCIUM, MG in the last 48 hours.    Significant Imaging: Echocardiogram:   Transthoracic echo (TTE) complete (Cupid Only):   Results for orders placed or performed during the hospital encounter of 08/02/19   Transthoracic echo (TTE) 2D with Color Flow   Result Value Ref Range    Ascending aorta 3.89 cm    STJ 2.85 cm    AV mean gradient 2 mmHg    Ao peak finesse 1.07 m/s    Ao VTI 18.65 cm    IVS 1.14 (A) 0.6 - 1.1 cm    LA size 4.81 cm    Left Atrium Major Axis 7.01 cm    Left Atrium Minor Axis 6.10 cm    LVIDD 5.36 3.5 - 6.0 cm    LVIDS 3.72 2.1 - 4.0 cm    LVOT diameter 2.14 cm    LVOT peak VTI 18.02 cm    PW 0.97 0.6 - 1.1 cm    MV Peak E Finesse 1.11 m/s    RA Major Axis 5.74 cm    RA Width 4.52 cm    RVDD 4.62 cm    Sinus 3.47 cm    TAPSE 1.41 cm    TR Max Finesse 2.30 m/s    TDI LATERAL 0.13 m/s    TDI SEPTAL 0.07 m/s    LA WIDTH 4.41 cm    LV Diastolic Volume 139.09 mL    LV Systolic Volume 58.94 mL    RV S' 9.82 cm/s    LVOT peak finesse 0.90 m/s    LV LATERAL E/E' RATIO 8.54 m/s    LV SEPTAL E/E' RATIO 15.86 m/s    FS 31 %    LA volume 117.62 cm3    LV mass 219.28 g    Left Ventricle Relative Wall Thickness 0.36 cm    AV valve area 3.47 cm2    AV Velocity Ratio 0.84     AV index (prosthetic) 0.97     Mean e' 0.10 m/s    LVOT area 3.6 cm2    LVOT stroke volume 64.78 cm3    AV peak gradient 5 mmHg    E/E' ratio 11.10 m/s    Triscuspid Valve Regurgitation Peak Gradient 21 mmHg    BSA 2.18 m2    LV Systolic  Volume Index 27.2 mL/m2    LV Diastolic Volume Index 64.23 mL/m2    LA Volume Index 54.3 mL/m2    LV Mass Index 101 g/m2    Right Atrial Pressure (from IVC) 3 mmHg    TV rest pulmonary artery pressure 24 mmHg    Narrative    · Normal left ventricular systolic function. The estimated ejection   fraction is 55%  · Mild right ventricular enlargement.  · Mildly reduced right ventricular systolic function.  · The ascending aorta is mildly dilated.  · Severe left atrial enlargement.  · Moderate right atrial enlargement.  · Mild mitral regurgitation.  · The estimated PA systolic pressure is 24 mm Hg  · Normal central venous pressure (3 mm Hg).        Assessment and Plan:     Persistent atrial fibrillation  1. FELICIANO for evaluation of SHERRILL for DCCV  -No absolute contraindications of esophageal stricture, tumor, perforation, laceration,or diverticulum and/or active GI bleed  -The risks, benefits & alternatives of the procedure were explained to the patient.   -The risks of transesophageal echo include but are not limited to:  Dental trauma, esophageal trauma/perforation, bleeding, laryngospasm/brochospasm, aspiration, sore throat/hoarseness, & dislodgement of the endotracheal tube/nasogastric tube (where applicable).    -The risks of moderate sedation include hypotension, respiratory depression, arrhythmias, bronchospasm, & death.    -Informed consent was obtained. The patient is agreeable to proceed with the procedure and all questions and concerns addressed.    Case discussed with an attending in echocardiography lab.     Further recommendations per attending addendum          VTE Risk Mitigation (From admission, onward)    None          Chi Sexton MD  Cardiology   [facility]

## 2019-08-26 NOTE — ANESTHESIA POSTPROCEDURE EVALUATION
Anesthesia Post Evaluation    Patient: Ravi Zamorano    Procedure(s) Performed: Procedure(s) (LRB):  CARDIOVERSION (N/A)  ECHOCARDIOGRAM, TRANSESOPHAGEAL (N/A)    Final Anesthesia Type: general  Patient location during evaluation: PACU  Patient participation: Yes- Able to Participate  Level of consciousness: awake and alert  Post-procedure vital signs: reviewed and stable  Pain management: adequate  Airway patency: patent  PONV status at discharge: No PONV  Anesthetic complications: no      Cardiovascular status: hemodynamically stable  Respiratory status: unassisted  Hydration status: euvolemic  Follow-up not needed.          Vitals Value Taken Time   /79 8/26/2019 12:34 PM   Temp 36.4 °C (97.6 °F) 8/26/2019 12:04 PM   Pulse 59 8/26/2019 12:34 PM   Resp 18 8/26/2019 12:04 PM   SpO2 100 % 8/26/2019 12:04 PM         No case tracking events are documented in the log.      Pain/Gurmeet Score: Gurmeet Score: 10 (8/26/2019 11:58 AM)

## 2019-08-26 NOTE — HPI
63 year old M with a history of DM on insulin, CKD2, history of kidney transplant in 2016 (Cr 1.2 in 8/2019), and diastolic dysfunction, who has a several month history of DOSS. He was seen in cardiology clinic (8/1/2019) where an ECG showed rate controlled AF (last ECG in the EMR from 3/15/2019 shows sinus rhythm). He was placed on Eliquis 5 mg bid, and presents for FELICIANO/DCCV    DSE 8/19    · Concentric left ventricular remodeling. Normal left ventricular systolic function. The estimated ejection fraction is 60%  · Moderate left atrial enlargement.  · Normal LV diastolic function.  · Normal right ventricular systolic function  · The EKG portion of this study is negative for myocardial ischemia.  · The stress echo portion of this study is negative for myocardial ischemia.     Dysphagia or odynophagia:  No  Liver Disease, esophageal disease, or known varices:  No  Upper GI Bleeding: No  Snoring:  No  Sleep Apnea:  No  Prior neck surgery or radiation:  No  History of anesthetic difficulties:  No  Family history of anesthetic difficulties:  No  Last oral intake:  12 hours ago  Able to move neck in all directions:  Yes

## 2019-08-26 NOTE — TRANSFER OF CARE
"Anesthesia Transfer of Care Note    Patient: Ravi Zamorano    Procedure(s) Performed: Procedure(s) (LRB):  CARDIOVERSION (N/A)  ECHOCARDIOGRAM, TRANSESOPHAGEAL (N/A)    Patient location: PACU    Anesthesia Type: general    Transport from OR: Transported from OR on 2-3 L/min O2 by NC with adequate spontaneous ventilation    Post pain: adequate analgesia    Post assessment: no apparent anesthetic complications and tolerated procedure well    Post vital signs: stable    Level of consciousness: awake    Nausea/Vomiting: no nausea/vomiting    Complications: none    Transfer of care protocol was followed      Last vitals:   Visit Vitals  /69 (BP Location: Right arm, Patient Position: Lying)   Pulse 56   Temp 36.3 °C (97.3 °F) (Oral)   Resp 16   Ht 6' 1" (1.854 m)   Wt 92.1 kg (203 lb)   SpO2 100%   BMI 26.78 kg/m²     "

## 2019-08-26 NOTE — PLAN OF CARE
Problem: Adult Inpatient Plan of Care  Goal: Plan of Care Review  Outcome: Ongoing (interventions implemented as appropriate)  Pt arrived to floor ambulatory from home accompanied by his wife. Pt oriented to room. Admit assessment complete. Nurse call bell within reach. Will continue to monitor

## 2019-08-27 ENCOUNTER — TELEPHONE (OUTPATIENT)
Dept: ORTHOPEDICS | Facility: CLINIC | Age: 64
End: 2019-08-27

## 2019-08-27 NOTE — TELEPHONE ENCOUNTER
Ortho Referral: [id number]  Pt states has a lot going on at this time and would like to call back at later date to schedule Ortho appt for c/o chronic L knee pain per Dr. Mack/PCP referral. Ortho contact number provided.

## 2019-08-27 NOTE — DISCHARGE SUMMARY
[facility]  Cardiac Electrophysiology  Discharge Summary      Patient Name: Ravi Zamorano  MRN: [id number]  Admission Date: 8/26/2019  Hospital Length of Stay: 0 days  Discharge Date and Time: 8/26/2019  1:19 PM  Attending Physician: Bronson Bowden MD   Discharging Provider: Deidre Estrada NP  Primary Care Physician: Mima Mack MD    HPI: Mr. Zamorano is a 63 year old male with a PMHx of atrial fibrillation, DM on insulin, CKD2, history of kidney transplant in 2016 (Cr 1.2 in 8/2019), and diastolic dysfunction, who has a several month history of DOSS. He was seen in cardiology clinic (8/1/2019) where an ECG showed rate controlled AF (last ECG in the EMR from 3/15/2019 shows sinus rhythm). He was placed on Eliquis 5 mg bid.     Recent cardiac studies include an echo performed in 8/2019 which showed an EF of 55% and severe LAE (MINA 54.3 mL/m2). A stress echo is pending.     Plan post-cardioversion, a Class IC antiarrhythmic will be initiated provided his upcoming DSE is normal.  8/19/19 DSE normal.    He presents today to [facility] for scheduled FELICIANO/DCCV with Dr. Bowden. He is currently on eliquis 5 mg BID and carvedilol 25 mg BID.    EKG today shows AF at 66 bpm.    Procedure(s) (LRB):  CARDIOVERSION (N/A)  ECHOCARDIOGRAM, TRANSESOPHAGEAL (N/A)     Indwelling Lines/Drains at time of discharge:  Lines/Drains/Airways     None.             Hospital Course: Patient presented in atrial fibrillation. Patient on Eliquis. FELICIANO completed, negative for SHERRILL thrombus. DCCV completed with restoration of normal sinus rhythm (see procedure report). Patient tolerated procedure well with no acute complications. Post procedure EKG showed sinus bradycardia with PVCs with a ventricular rate of 56 bpm. Patient voiding, tolerating PO intake, and ambulating without any difficulty. Reviewed EKG and discharge plans with Dr. Bowden. Plan to start flecainide 100 mg BID. Continue home medications including eliquis 5 mg  BID and carvedilol 25 mg BID.  Follow up EKG in 1 week and follow up with Dr. Bowden in 4 weeks.  Discharge plans/instructions discussed with patient and wife who verbalized understanding and agreement of plans of care. No further questions or concerns voiced at this time. Discharged home in stable condition.     Physical Exam  Constitutional: He is oriented to person, place, and time. He appears well-developed and well-nourished.   HENT:   Head: Normocephalic and atraumatic.   Eyes: Conjunctivae, EOM and lids are normal. No scleral icterus.   Neck: Normal range of motion. No tracheal deviation present.   Cardiovascular: Normal rate and intact distal pulses. Regular rhythm present. No extrasystoles are present. PMI is not displaced. Exam reveals no gallop and no friction rub. No murmur heard.  Pulses:       Radial pulses are 2+ on the right side, and 2+ on the left side.   Pulmonary/Chest: Effort normal and breath sounds normal. No accessory muscle usage. No tachypnea. No respiratory distress. He has no wheezes. He has no rales.   Abdominal: Soft. Bowel sounds are normal. He exhibits no distension. There is no tenderness.   Musculoskeletal: Normal range of motion. He exhibits no edema.   Neurological: He is alert and oriented to person, place, and time. He has normal reflexes. He exhibits normal muscle tone.   Skin: Skin is warm and dry. No rash noted.   Psychiatric: He has a normal mood and affect. His behavior is normal.   Nursing note and vitals reviewed.    Consults:   Anesthesia.    Significant Diagnostic Studies: Labs from 8/19/19 through today reviewed.   INR   Lab Results   Component Value Date    INR 1.1 08/19/2019    INR 1.0 03/15/2019    INR 0.9 11/05/2016     Cardiac Graphics: Echocardiogram:   Transthoracic echo (TTE) complete (Cupid Only):   Results for orders placed or performed during the hospital encounter of 08/02/19   Transthoracic echo (TTE) 2D with Color Flow   Result Value Ref Range    Ascending  aorta 3.89 cm    STJ 2.85 cm    AV mean gradient 2 mmHg    Ao peak finesse 1.07 m/s    Ao VTI 18.65 cm    IVS 1.14 (A) 0.6 - 1.1 cm    LA size 4.81 cm    Left Atrium Major Axis 7.01 cm    Left Atrium Minor Axis 6.10 cm    LVIDD 5.36 3.5 - 6.0 cm    LVIDS 3.72 2.1 - 4.0 cm    LVOT diameter 2.14 cm    LVOT peak VTI 18.02 cm    PW 0.97 0.6 - 1.1 cm    MV Peak E Finesse 1.11 m/s    RA Major Axis 5.74 cm    RA Width 4.52 cm    RVDD 4.62 cm    Sinus 3.47 cm    TAPSE 1.41 cm    TR Max Finesse 2.30 m/s    TDI LATERAL 0.13 m/s    TDI SEPTAL 0.07 m/s    LA WIDTH 4.41 cm    LV Diastolic Volume 139.09 mL    LV Systolic Volume 58.94 mL    RV S' 9.82 cm/s    LVOT peak finesse 0.90 m/s    LV LATERAL E/E' RATIO 8.54 m/s    LV SEPTAL E/E' RATIO 15.86 m/s    FS 31 %    LA volume 117.62 cm3    LV mass 219.28 g    Left Ventricle Relative Wall Thickness 0.36 cm    AV valve area 3.47 cm2    AV Velocity Ratio 0.84     AV index (prosthetic) 0.97     Mean e' 0.10 m/s    LVOT area 3.6 cm2    LVOT stroke volume 64.78 cm3    AV peak gradient 5 mmHg    E/E' ratio 11.10 m/s    Triscuspid Valve Regurgitation Peak Gradient 21 mmHg    BSA 2.18 m2    LV Systolic Volume Index 27.2 mL/m2    LV Diastolic Volume Index 64.23 mL/m2    LA Volume Index 54.3 mL/m2    LV Mass Index 101 g/m2    Right Atrial Pressure (from IVC) 3 mmHg    TV rest pulmonary artery pressure 24 mmHg    Narrative    · Normal left ventricular systolic function. The estimated ejection   fraction is 55%  · Mild right ventricular enlargement.  · Mildly reduced right ventricular systolic function.  · The ascending aorta is mildly dilated.  · Severe left atrial enlargement.  · Moderate right atrial enlargement.  · Mild mitral regurgitation.  · The estimated PA systolic pressure is 24 mm Hg  · Normal central venous pressure (3 mm Hg).        Final Active Diagnoses:    Diagnosis Date Noted POA    PRINCIPAL PROBLEM:  Persistent atrial fibrillation [I48.1] 08/09/2019 Yes      Problems Resolved During this  Admission:     No new Assessment & Plan notes have been filed under this hospital service since the last note was generated.  Service: Arrhythmia    Discharged Condition: stable    Disposition: Home or Self Care    Follow Up:  Follow-up Information     Bronson Bowden MD In 1 month.    Specialties:  Electrophysiology, Cardiovascular Disease, Cardiology  Why:  s/p [facility]  Contact information:  [address] [place] [id number]  [phone]             EKG 1 [facility] On 9/3/2019.    Specialty:  Cardiology  Why:  9/3/2019 10:15 AM               Patient Instructions:      Diet Cardiac     Diet diabetic     No driving until:   Order Comments: No driving or operating heavy machinery for 24-48 hours after your procedure because you received sedation.     Other restrictions (specify):   Order Comments: Medications:  -Continue to take your home medications as listed on your medication list after you are discharged.  -If you have problems or side effects caused by your medications, call your Physician.    New Medications:  Flecainide (Tambocor) 100 mg two times daily.  -Information on this medication is included in your discharge paperwork.  -Please call the clinic if you have any questions regarding your new medication.     Diet  -You may resume oral intake after you are discharged, as long you have no swallowing difficulties.    Side effects:  -You may be drowsy for the remainder of the day because you received sedation.    Because you have received sedation for this procedure:  -Limit activity for the remainder of the day.  -Do not drive or operate any equipment for the remainder of the day.  -Do not smoke for at least 6 hours and until you are fully awake and alert.  -Do not drink alcoholic beverage for 24 hours.  -Defer important decision making until the following day.    Go to the Emergency Department if you develop:  -Bleeding  -Weakness or numbness  -Visual, gait or speech disturbance  -New chest pain, palpitations,  "shortness of breath, rapid heart beat, or fainting  -Fever    Follow up:  -EKG on 9/3/2019 10:15 AM  -Dr. Bowden in 1 month.     Notify your health care provider if you experience any of the following:   Order Comments: For any concerning medical symptoms.     Notify your health care provider if you experience any of the following:  increased confusion or weakness     Notify your health care provider if you experience any of the following:  persistent dizziness, light-headedness, or visual disturbances     Notify your health care provider if you experience any of the following:  worsening rash     Notify your health care provider if you experience any of the following:  severe persistent headache     Notify your health care provider if you experience any of the following:  difficulty breathing or increased cough     Notify your health care provider if you experience any of the following:  redness, tenderness, or signs of infection (pain, swelling, redness, odor or green/yellow discharge around incision site)     Notify your health care provider if you experience any of the following:  severe uncontrolled pain     Notify your health care provider if you experience any of the following:  persistent nausea and vomiting or diarrhea     Notify your health care provider if you experience any of the following:  temperature >100.4     Medications:  Reconciled Home Medications:      Medication List      START taking these medications    flecainide 100 MG Tab  Commonly known as:  TAMBOCOR  Take 1 tablet (100 mg total) by mouth every 12 (twelve) hours.        CHANGE how you take these medications    * BD ULTRA-FINE LO PEN NEEDLE 32 gauge x 5/32" Ndle  Generic drug:  pen needle, diabetic  USE TO ADMINISTER INSULIN 4 TIMES DAILY.  What changed:  Another medication with the same name was added. Make sure you understand how and when to take each.     * BD ULTRA-FINE LO PEN NEEDLE 32 gauge x 5/32" Ndle  Generic drug:  pen " needle, diabetic  use daily as directed  What changed:  You were already taking a medication with the same name, and this prescription was added. Make sure you understand how and when to take each.         * This list has 2 medication(s) that are the same as other medications prescribed for you. Read the directions carefully, and ask your doctor or other care provider to review them with you.            CONTINUE taking these medications    aspirin 81 MG Chew  Take 81 mg by mouth once daily.     atorvastatin 40 MG tablet  Commonly known as:  LIPITOR  Take 1 tablet (40 mg total) by mouth once daily.     * blood sugar diagnostic Strp  1 strip by Misc.(Non-Drug; Combo Route) route 4 (four) times daily before meals and nightly. e11.65     * blood sugar diagnostic Strp  test blood sugar four times daily     blood-glucose meter kit  Commonly known as:  FREESTYLE SYSTEM KIT  Use as instructed e11.65 may substitute meter as covered by insurance     carvedilol 25 MG tablet  Commonly known as:  COREG  Take 1 tablet (25 mg total) by mouth 2 (two) times daily.     ELIQUIS 5 mg Tab  Generic drug:  apixaban  Take 1 tablet (5 mg total) by mouth 2 (two) times daily.     famotidine 20 MG tablet  Commonly known as:  PEPCID  Take 1 tablet (20 mg total) by mouth every evening.     gabapentin 300 MG capsule  Commonly known as:  NEURONTIN  Take 1 tablet in the morning; 1 tablet midday and 3 tablets at night.     HumaLOG KwikPen Insulin 100 unit/mL pen  Generic drug:  insulin lispro  Inject 18 units w/ breakfast, 16 units w/ lunch and dinner plus scale 150-200 +2, 201-250 +4, 251-300 +6, 301-350 +8.     hydrALAZINE 25 MG tablet  Commonly known as:  APRESOLINE  Take 1 tablet (25 mg total) by mouth 2 (two) times daily.     lancets Misc  1 each by Misc.(Non-Drug; Combo Route) route 4 (four) times daily before meals and nightly. e11.65     LEVEMIR FLEXTOUCH U-100 INSULN 100 unit/mL (3 mL) Inpn pen  Generic drug:  insulin detemir U-100  Inject  35 Units into the skin every evening.     meclizine 25 mg tablet  Commonly known as:  ANTIVERT  Take 1 tablet (25 mg total) by mouth 3 (three) times daily as needed for Dizziness.     melatonin 5 mg Tab  Take 1 tablet (5 mg total) by mouth every evening.     mycophenolate 250 mg Cap  Commonly known as:  CELLCEPT  Take 4 capsules (1,000 mg total) by mouth 2 (two) times daily.     NIFEdipine 30 MG (OSM) 24 hr tablet  Commonly known as:  PROCARDIA-XL  Take 1 tablet (30 mg total) by mouth once daily.     predniSONE 5 MG tablet  Commonly known as:  DELTASONE  Take 1 tablet (5 mg total) by mouth once daily.     tacrolimus 0.5 MG Cap  Commonly known as:  PROGRAF  Take 1mg (2 capsules)  orally in the am and 1 mg (2 capsule) orally in the pm         * This list has 2 medication(s) that are the same as other medications prescribed for you. Read the directions carefully, and ask your doctor or other care provider to review them with you.              Plan:  -Continue all home medications.  -Start flecainide 100 mg BID.  -Follow up EKG in 1 week.  -Follow up with Dr. Bowden in 1 month.    Time spent on the discharge of patient: 19 minutes    Deidre Estrada NP  Cardiac Electrophysiology  [facility]    Attending: Bronson Bowden MD

## 2019-09-03 ENCOUNTER — HOSPITAL ENCOUNTER (OUTPATIENT)
Dept: CARDIOLOGY | Facility: CLINIC | Age: 64
Discharge: HOME | End: 2019-09-03
Payer: COMMERCIAL

## 2019-09-03 DIAGNOSIS — I50.32 CHRONIC DIASTOLIC CONGESTIVE HEART FAILURE: ICD-10-CM

## 2019-09-03 PROCEDURE — 93010 RHYTHM STRIP: ICD-10-PCS | Mod: S$GLB,,, | Performed by: INTERNAL MEDICINE

## 2019-09-03 PROCEDURE — 93005 ELECTROCARDIOGRAM TRACING: CPT | Mod: S$GLB,,, | Performed by: INTERNAL MEDICINE

## 2019-09-03 PROCEDURE — 93010 ELECTROCARDIOGRAM REPORT: CPT | Mod: S$GLB,,, | Performed by: INTERNAL MEDICINE

## 2019-09-03 PROCEDURE — 93005 RHYTHM STRIP: ICD-10-PCS | Mod: S$GLB,,, | Performed by: INTERNAL MEDICINE

## 2019-09-05 ENCOUNTER — TELEPHONE (OUTPATIENT)
Dept: ELECTROPHYSIOLOGY | Facility: CLINIC | Age: 64
End: 2019-09-05

## 2019-09-25 ENCOUNTER — PATIENT OUTREACH (OUTPATIENT)
Dept: ADMINISTRATIVE | Facility: OTHER | Age: 64
End: 2019-09-25

## 2019-09-26 ENCOUNTER — OFFICE VISIT (OUTPATIENT)
Dept: CARDIOLOGY | Facility: CLINIC | Age: 64
End: 2019-09-26
Payer: COMMERCIAL

## 2019-09-26 VITALS
WEIGHT: 204.38 LBS | HEART RATE: 53 BPM | OXYGEN SATURATION: 98 % | DIASTOLIC BLOOD PRESSURE: 81 MMHG | BODY MASS INDEX: 27.09 KG/M2 | SYSTOLIC BLOOD PRESSURE: 168 MMHG | HEIGHT: 73 IN

## 2019-09-26 DIAGNOSIS — I50.32 CHRONIC DIASTOLIC CONGESTIVE HEART FAILURE: Primary | ICD-10-CM

## 2019-09-26 DIAGNOSIS — I10 ESSENTIAL HYPERTENSION: ICD-10-CM

## 2019-09-26 DIAGNOSIS — E78.5 HYPERLIPIDEMIA, UNSPECIFIED HYPERLIPIDEMIA TYPE: ICD-10-CM

## 2019-09-26 DIAGNOSIS — I48.19 PERSISTENT ATRIAL FIBRILLATION: ICD-10-CM

## 2019-09-26 PROCEDURE — 3077F SYST BP >= 140 MM HG: CPT | Mod: CPTII,S$GLB,, | Performed by: STUDENT IN AN ORGANIZED HEALTH CARE EDUCATION/TRAINING PROGRAM

## 2019-09-26 PROCEDURE — 3008F PR BODY MASS INDEX (BMI) DOCUMENTED: ICD-10-PCS | Mod: CPTII,S$GLB,, | Performed by: STUDENT IN AN ORGANIZED HEALTH CARE EDUCATION/TRAINING PROGRAM

## 2019-09-26 PROCEDURE — 99999 PR PBB SHADOW E&M-EST. PATIENT-LVL V: ICD-10-PCS | Mod: PBBFAC,,, | Performed by: STUDENT IN AN ORGANIZED HEALTH CARE EDUCATION/TRAINING PROGRAM

## 2019-09-26 PROCEDURE — 3008F BODY MASS INDEX DOCD: CPT | Mod: CPTII,S$GLB,, | Performed by: STUDENT IN AN ORGANIZED HEALTH CARE EDUCATION/TRAINING PROGRAM

## 2019-09-26 PROCEDURE — 3077F PR MOST RECENT SYSTOLIC BLOOD PRESSURE >= 140 MM HG: ICD-10-PCS | Mod: CPTII,S$GLB,, | Performed by: STUDENT IN AN ORGANIZED HEALTH CARE EDUCATION/TRAINING PROGRAM

## 2019-09-26 PROCEDURE — 3079F PR MOST RECENT DIASTOLIC BLOOD PRESSURE 80-89 MM HG: ICD-10-PCS | Mod: CPTII,S$GLB,, | Performed by: STUDENT IN AN ORGANIZED HEALTH CARE EDUCATION/TRAINING PROGRAM

## 2019-09-26 PROCEDURE — 99999 PR PBB SHADOW E&M-EST. PATIENT-LVL V: CPT | Mod: PBBFAC,,, | Performed by: STUDENT IN AN ORGANIZED HEALTH CARE EDUCATION/TRAINING PROGRAM

## 2019-09-26 PROCEDURE — 99215 PR OFFICE/OUTPT VISIT, EST, LEVL V, 40-54 MIN: ICD-10-PCS | Mod: S$GLB,,, | Performed by: STUDENT IN AN ORGANIZED HEALTH CARE EDUCATION/TRAINING PROGRAM

## 2019-09-26 PROCEDURE — 99215 OFFICE O/P EST HI 40 MIN: CPT | Mod: S$GLB,,, | Performed by: STUDENT IN AN ORGANIZED HEALTH CARE EDUCATION/TRAINING PROGRAM

## 2019-09-26 PROCEDURE — 3079F DIAST BP 80-89 MM HG: CPT | Mod: CPTII,S$GLB,, | Performed by: STUDENT IN AN ORGANIZED HEALTH CARE EDUCATION/TRAINING PROGRAM

## 2019-09-26 RX ORDER — CANDESARTAN 8 MG/1
8 TABLET ORAL DAILY
Qty: 90 TABLET | Refills: 3 | Status: SHIPPED | OUTPATIENT
Start: 2019-09-26 | End: 2019-09-30 | Stop reason: ALTCHOICE

## 2019-09-26 NOTE — PROGRESS NOTES
Cardiology Clinic Note  Reason for Visit: 1 month f/u    HPI:   Mr. Ravi Zamorano is a 63 y.o. gentleman with history of newly dx AF, controlled DM, CKD 2, hx of kidney transplant, DD here as a one month f/u after being found in afib one month ago when he came to see me in clinic. He was symptomatic (complained of DOSS for several months) and referred him to Dr. SHEREEN Bowden in EP for FELICIANO/DCCV after starting him on Eliquis 5 mg BID for a CHADS-VASC score of 2. He underwent DCCV successfully and was started on Flecainide. He returns for f/u.    Today he denies any SOB with exertion or any chest pain with exertion. Reports not missing any doses of his Eliquis. No episodes of bleeding from mouth or rectum. Reports that his BP is well controlled at home. Reports that his BP runs lower than 140/80 generally when he checks. He reports that he had some hilton this morning.       Denies leg swelling, cough, orthopnea/PND, LOC, claudication, syncope.    Denies any CVA, MI in the past.    Prior cardiac hx: (other than stated above):  Notably his past medical hx shows that he had a prior DSE in 2016 which showed no evidence of WMA but showed DD. He had a similar results in 2014. Last EKG was in 03/2019 which showed NSR with non-specific ST segment abnormalities. Similar findings were seen in 2016. Recent DSE was negative, done prior to initiation of flecainide.    TSH 03/2019 - 1.14     Medications:  Aspirin 81 mg daily  lipitor 40 mg daily  Coreg 25 mg BID  Eliquis 5 mg BID  Flecainide 100 mg BID  Hydralazine 25 mg BID (taking this once a day)  Nifedipine 30 mg daily      ROS:    Review of Systems   Constitution: Negative for chills, fever and weight gain.   HENT: Negative for congestion and ear pain.    Cardiovascular:        As per HPI   Respiratory: Negative for cough, shortness of breath, sputum production and wheezing.    Hematologic/Lymphatic: Negative for bleeding problem.   Musculoskeletal: Positive for joint pain  (left knee).   Gastrointestinal: Negative for diarrhea, nausea and vomiting.   Genitourinary: Negative for dysuria, flank pain and frequency.   Neurological: Negative for headaches and light-headedness.     PMH:     Past Medical History:   Diagnosis Date    Anxiety 7/29/2014    Arthritis     CKD (chronic kidney disease) stage 2, GFR 60-89 ml/min 12/28/2016    CKD (chronic kidney disease) stage 4, GFR 15-29 ml/min 7/29/2014    Depression - situational 7/29/2014    Diabetes mellitus     Diabetes type 2 since 2000 7/29/2014    Diabetic neuropathy 7/29/2014    History of hepatitis C, s/p successful Rx w/ [id number] - 9/2017 7/29/2014    Genotype 1a, treatment naive 10/2014 liver biopsy - grade 1 / stage 1 Completed Harvoni w/ SVR    Hyperlipidemia 7/29/2014    Hypertension     Neuropathy     Organ transplant candidate 7/29/2014    Pancreatitis      Past Surgical History:   Procedure Laterality Date    APPENDECTOMY      KIDNEY TRANSPLANT      TRANSESOPHAGEAL ECHOCARDIOGRAPHY N/A 8/26/2019    Procedure: ECHOCARDIOGRAM, TRANSESOPHAGEAL;  Surgeon: Dolores Diagnostic Provider;  Location: [facility] EP LAB;  Service: Cardiology;  Laterality: N/A;    TREATMENT OF CARDIAC ARRHYTHMIA N/A 8/26/2019    Procedure: CARDIOVERSION;  Surgeon: Bronson Bowden MD;  Location: [facility] EP LAB;  Service: Cardiology;  Laterality: N/A;  AF, FELICIANO, DCCV, MAC, GP, 3 PREP     Allergies:   Review of patient's allergies indicates:  No Known Allergies  Medications:     Current Outpatient Medications on File Prior to Visit   Medication Sig Dispense Refill    apixaban (ELIQUIS) 5 mg Tab Take 1 tablet (5 mg total) by mouth 2 (two) times daily. 60 tablet 11    aspirin 81 MG Chew Take 81 mg by mouth once daily.      atorvastatin (LIPITOR) 40 MG tablet Take 1 tablet (40 mg total) by mouth once daily. 90 tablet 3    blood sugar diagnostic Strp 1 strip by Misc.(Non-Drug; Combo Route) route 4 (four) times daily before meals and nightly. e11.65 100 strip 5  "   blood sugar diagnostic Strp test blood sugar four times daily 200 each 6    blood-glucose meter (FREESTYLE SYSTEM KIT) kit Use as instructed e11.65 may substitute meter as covered by insurance 1 each 0    carvedilol (COREG) 25 MG tablet Take 1 tablet (25 mg total) by mouth 2 (two) times daily. (Patient taking differently: Take 25 mg by mouth 2 (two) times daily. ) 60 tablet 4    famotidine (PEPCID) 20 MG tablet Take 1 tablet (20 mg total) by mouth every evening. 90 tablet 3    flecainide (TAMBOCOR) 100 MG Tab Take 1 tablet (100 mg total) by mouth every 12 (twelve) hours. 60 tablet 3    gabapentin (NEURONTIN) 300 MG capsule Take 1 tablet in the morning; 1 tablet midday and 3 tablets at night. 450 capsule 3    hydrALAZINE (APRESOLINE) 25 MG tablet Take 1 tablet (25 mg total) by mouth 2 (two) times daily. 60 tablet 1    insulin detemir U-100 (LEVEMIR FLEXTOUCH) 100 unit/mL (3 mL) SubQ InPn pen Inject 35 Units into the skin every evening. 15 mL 4    insulin lispro (HUMALOG KWIKPEN INSULIN) 100 unit/mL pen Inject 18 units w/ breakfast, 16 units w/ lunch and dinner plus scale 150-200 +2, 201-250 +4, 251-300 +6, 301-350 +8. 30 mL 4    lancets Misc 1 each by Misc.(Non-Drug; Combo Route) route 4 (four) times daily before meals and nightly. e11.65 100 each 0    meclizine (ANTIVERT) 25 mg tablet Take 1 tablet (25 mg total) by mouth 3 (three) times daily as needed for Dizziness. 21 tablet 0    melatonin 5 mg Tab Take 1 tablet (5 mg total) by mouth every evening.      mycophenolate (CELLCEPT) 250 mg Cap Take 4 capsules (1,000 mg total) by mouth 2 (two) times daily. 240 capsule 11    NIFEdipine (PROCARDIA-XL) 30 MG (OSM) 24 hr tablet Take 1 tablet (30 mg total) by mouth once daily. 30 tablet 11    pen needle, diabetic 32 gauge x 5/32" Ndle USE TO ADMINISTER INSULIN 4 TIMES DAILY. 400 each 3    pen needle, diabetic 32 gauge x 5/32" Ndle use daily as directed 100 each 1    predniSONE (DELTASONE) 5 MG tablet Take " "1 tablet (5 mg total) by mouth once daily. 30 tablet 11    tacrolimus (PROGRAF) 0.5 MG Cap Take 1mg (2 capsules)  orally in the am and 1 mg (2 capsule) orally in the pm 120 capsule 11     No current facility-administered medications on file prior to visit.      Social History:     Social History     Tobacco Use    Smoking status: Former Smoker     Packs/day: 0.50     Years: 32.00     Pack years: 16.00     Types: Cigarettes     Last attempt to quit: 2016     Years since quittin.8    Smokeless tobacco: Never Used    Tobacco comment: Pt reports that he quit in , but started up again in . pt reports he is currently working on quitting again   Substance Use Topics    Alcohol use: Yes     Comment: Pt reports occasional beers on Sundays. Pt reports drinking daily prior to ESRD diagnosis.     Family History:     Family History   Problem Relation Age of Onset    Diabetes Mother     Hypertension Mother     Heart disease Mother         CAD with PCI    Heart disease Father     Cancer Brother         one sister with breast cancer    Hypertension Brother         one sister with HTN and borderline DM    Stroke Neg Hx     Kidney disease Neg Hx      Physical Exam:   BP (!) 168/81 (BP Location: Left arm, Patient Position: Sitting, BP Method: Medium (Automatic))   Pulse (!) 53   Ht 6' 1" (1.854 m)   Wt 92.7 kg (204 lb 5.9 oz)   SpO2 98%   BMI 26.96 kg/m²      Recheck of BP is 155/73 left arm      Physical Exam   Constitutional: He is oriented to person, place, and time. He appears well-developed and well-nourished.   HENT:   Head: Normocephalic and atraumatic.   Neck: No JVD present. No thyromegaly present.   Cardiovascular: Regular rhythm, normal heart sounds and intact distal pulses.   DP and PT intact bilaterally  SB   Pulmonary/Chest: Effort normal and breath sounds normal. No respiratory distress. He has no wheezes.   Abdominal: Soft. Bowel sounds are normal. He exhibits no distension. There is " no tenderness.   Musculoskeletal: He exhibits no edema.   Neurological: He is alert and oriented to person, place, and time.   Skin: Skin is warm and dry. No rash noted.   Vitals reviewed.      Labs:     Lab Results   Component Value Date     2019    K 4.4 2019     2019    CO2 26 2019    BUN 14 2019    CREATININE 1.4 2019    ANIONGAP 10 2019     Lab Results   Component Value Date    HGBA1C 8.6 (H) 2019     Lab Results   Component Value Date     (H) 10/23/2014    Lab Results   Component Value Date    WBC 4.91 2019    HGB 13.8 (L) 2019    HCT 47.4 2019    HCT 31 (L) 2016     2019    GRAN 2.2 2019    GRAN 44.0 2019     Lab Results   Component Value Date    CHOL 161 2019    HDL 37 (L) 2019    LDLCALC 99.6 2019    TRIG 122 2019          Imaging:     DSE 2016:  CONCLUSIONS     1 - Enlarged ascending aorta.     2 - Concentric hypertrophy.     3 - Normal left ventricular systolic function (EF 60-65%).     4 - Left ventricular diastolic dysfunction.     5 - Mild mitral regurgitation.     6 - The estimated PA systolic pressure is greater than 21 mmHg.     7 - Normal right ventricular systolic function .     8 - Right ventricular hypertrophy.     9 - Trivial tricuspid regurgitation.     No evidence of stress induced myocardial ischemia.     DSE 2014:    1 - Concentric hypertrophy.     2 - Normal left ventricular systolic function (EF 60-65%).     3 - Left ventricular diastolic dysfunction.     4 - Normal right ventricular systolic function .     5 - Biatrial enlargement.     6 - Mildly enlarged ascending aorta.     7 - Trivial mitral regurgitation.     8 - Trivial to mild tricuspid regurgitation.     9 - Trivial pericardial effusion.     EK/2019:  SB with 1st degree AV block    Assessment:     1. Afib (CHADS-VASC of 2) - In NSR here. Told patient continue taking his Eliquis and  Flecainide.   2. Essential hypertension - Was well controlled at goal less than 130/80. Now elevated as the patient admits to a salty diet. Will recommend to stay off of a salt rich diet and then stop his hydralazine. Not on any renal protection for his DM. Added Candesartan 8 mg daily. Will uptitrate as needed. Will recheck a BMP next week. Cr was 1.4 a month ago.   3. Hyperlipidemia, unspecified hyperlipidemia type - recent LDL is 99 on Lipitor 10 mg   4. Chronic diastolic dysfunction - HTN not controlled here secondary to dietary non-compliance. Euvolemic on PEX.       Recommended to the patient to stay off of a salty diet and stay away from supplements not approved by his [facility] specialists.    Plan:     Ravi was seen today for shortness of breath.    Diagnoses and all orders for this visit:    Chronic diastolic congestive heart failure  -     candesartan (ATACAND) 8 MG tablet; Take 1 tablet (8 mg total) by mouth once daily.  -     Basic metabolic panel; Future    Hyperlipidemia, unspecified hyperlipidemia type  -     Basic metabolic panel; Future    Essential hypertension  -     Basic metabolic panel; Future    Persistent atrial fibrillation  -     Basic metabolic panel; Future      Discussed with Dr. Felder. RTC in 1 month for BP check.     Signed:  Mishel Posada MD  9/26/2019 5:17 PM

## 2019-09-30 ENCOUNTER — TELEPHONE (OUTPATIENT)
Dept: CARDIOLOGY | Facility: CLINIC | Age: 64
End: 2019-09-30

## 2019-09-30 DIAGNOSIS — I10 ESSENTIAL HYPERTENSION: Primary | ICD-10-CM

## 2019-09-30 RX ORDER — VALSARTAN 160 MG/1
160 TABLET ORAL DAILY
Qty: 90 TABLET | Refills: 3 | Status: SHIPPED | OUTPATIENT
Start: 2019-09-30 | End: 2020-07-11

## 2019-09-30 NOTE — TELEPHONE ENCOUNTER
Patient called indicating that his cardesartan script was too much. Switched him to Valsartan 160 mg daily. Told him to let me know if it is still too expensive.    Mishel Posada MD, PGY-6   Cardiology fellow      ----- Message from Marlee Teague MA sent at 9/27/2019 10:21 AM CDT -----  Contact: pt called       ----- Message -----  From: Yisel Hilliard  Sent: 9/27/2019   8:19 AM CDT  To: Swetha Florentino Staff    Pt states that medication candesartan (ATACAND) 8 MG tablet is to expensive and would like an alternative medication.Lv 9/26/19 Dr. Posada. Please call pt @ . Thank you.

## 2019-10-01 ENCOUNTER — TELEPHONE (OUTPATIENT)
Dept: CARDIOLOGY | Facility: CLINIC | Age: 64
End: 2019-10-01

## 2019-10-01 NOTE — TELEPHONE ENCOUNTER
----- Message from Mindy Jimenez MA sent at 10/1/2019  3:03 PM CDT -----  Contact: patient called  The patient need to talk to the nurse about his medical valsartan last visit   was on 9- Dr. Posada please call [phone]. Thank you.

## 2019-10-01 NOTE — TELEPHONE ENCOUNTER
Patient questioning recent recall seen about Valsartan. Patient advised some batches of medication were affected as medicine is manufactured in different countries where there may be varying . Advised to call pharmacy and clarify that product at home was not affected by recall. Patient verbalized understanding. Denied further questions or concerns.

## 2019-10-02 ENCOUNTER — PATIENT OUTREACH (OUTPATIENT)
Dept: ADMINISTRATIVE | Facility: OTHER | Age: 64
End: 2019-10-02

## 2019-10-02 NOTE — PROGRESS NOTES
Mr. Zamorano is a patient of Dr. Bowden and was last seen in clinic 8/12/2019.      Subjective:   Patient ID:  Ravi Zamorano is a 63 y.o. male who presents for follow-up of Atrial Fibrillation  .     HPI:    Mr. Zamorano is a 63 y.o. male with DM, CKD, HFpEF, AF here for follow up after cardioversion.    Background:    Cardiologist: Mishel Posada MD    Ravi Zamorano has a history of DM on insulin, CKD2, history of kidney transplant in 2016 (Cr 1.2 in 8/2019), and diastolic dysfunction, who has a several month history of DOSS. He was seen in cardiology clinic (8/1/2019) where an ECG showed rate controlled AF (last ECG in the EMR from 3/15/2019 shows sinus rhythm). He was placed on Eliquis 5 mg bid, and presents to me today to discuss management options.    Recent cardiac studies include an echo performed in 8/2019 which showed an EF of 55% and severe LAE (MINA 54.3 mL/m2). A stress echo is pending.    Persistent AF, likely symptomatic. We discussed the risks, benefits, indications, and alternatives to FELICIANO/DCCV. He expressed understanding and wishes to proceed. Post-cardioversion, a Class IC antiarrhythmic will be initiated provided his upcoming DSE is normal.    Update (10/03/2019):    Underwent successful cardioversion on 8/26/2019 and flecainide 100mg BID was initiated.    Today he says he feels great. Significant improvement in SR. More energy and less DOSS. Mr. Zamorano denies chest pain with exertion or at rest, palpitations, SOB, DOSS, dizziness, or syncope.    He is currently taking eliquis 5mg ID for stroke prophylaxis and denies significant bleeding episodes. He is currently being treated with flecainide 100mg BID for rhythm control and carvedilol 25mg BID for HR control.  Kidney function is stable, with a creatinine of 1.4 on 8/19/2019.    I have personally reviewed the patient's EKG today, which shows sinus rhythm with 1st degree AVB at 58bpm. NH interval is 226. QRS is 118. QTc is 426.    Recent  Cardiac Tests:    FELICIANO (8/26/2019):  · Concentric left ventricular hypertrophy.Normal left ventricular systolic function. The estimated ejection fraction is 60%.  · Normal right ventricular systolic function.  · Severe left atrial enlargement.  · Moderate right atrial enlargement.  · Mild tricuspid regurgitation.  · No thrombus is present in the LA appendage or the LA    DSE (8/19/2019):  · Concentric left ventricular remodeling. Normal left ventricular systolic function. The estimated ejection fraction is 60%.  · Moderate left atrial enlargement.  · Normal LV diastolic function.  · Normal right ventricular systolic function.  · Normal central venous pressure (3 mm Hg).  · The estimated PA systolic pressure is 34 mm Hg  · The patient reached the end of the protocol.  · The EKG portion of this study is negative for myocardial ischemia.  · The stress echo portion of this study is negative for myocardial ischemia.    Current Outpatient Medications   Medication Sig    apixaban (ELIQUIS) 5 mg Tab Take 1 tablet (5 mg total) by mouth 2 (two) times daily.    aspirin 81 MG Chew Take 81 mg by mouth once daily.    atorvastatin (LIPITOR) 40 MG tablet Take 1 tablet (40 mg total) by mouth once daily.    carvedilol (COREG) 25 MG tablet Take 1 tablet (25 mg total) by mouth 2 (two) times daily. (Patient taking differently: Take 25 mg by mouth 2 (two) times daily. )    famotidine (PEPCID) 20 MG tablet Take 1 tablet (20 mg total) by mouth every evening.    flecainide (TAMBOCOR) 100 MG Tab Take 1 tablet (100 mg total) by mouth every 12 (twelve) hours.    gabapentin (NEURONTIN) 300 MG capsule Take 1 tablet in the morning; 1 tablet midday and 3 tablets at night.    insulin detemir U-100 (LEVEMIR FLEXTOUCH) 100 unit/mL (3 mL) SubQ InPn pen Inject 35 Units into the skin every evening.    insulin lispro (HUMALOG KWIKPEN INSULIN) 100 unit/mL pen Inject 18 units w/ breakfast, 16 units w/ lunch and dinner plus scale 150-200 +2, 201-250  "+4, 251-300 +6, 301-350 +8.    meclizine (ANTIVERT) 25 mg tablet Take 1 tablet (25 mg total) by mouth 3 (three) times daily as needed for Dizziness.    melatonin 5 mg Tab Take 1 tablet (5 mg total) by mouth every evening.    mycophenolate (CELLCEPT) 250 mg Cap Take 4 capsules (1,000 mg total) by mouth 2 (two) times daily.    NIFEdipine (PROCARDIA-XL) 30 MG (OSM) 24 hr tablet Take 1 tablet (30 mg total) by mouth once daily.    predniSONE (DELTASONE) 5 MG tablet Take 1 tablet (5 mg total) by mouth once daily.    tacrolimus (PROGRAF) 0.5 MG Cap Take 1mg (2 capsules)  orally in the am and 1 mg (2 capsule) orally in the pm    valsartan (DIOVAN) 160 MG tablet Take 1 tablet (160 mg total) by mouth once daily.    blood sugar diagnostic Strp 1 strip by Misc.(Non-Drug; Combo Route) route 4 (four) times daily before meals and nightly. e11.65    blood sugar diagnostic Strp test blood sugar four times daily    blood-glucose meter (FREESTYLE SYSTEM KIT) kit Use as instructed e11.65 may substitute meter as covered by insurance    lancets Misc 1 each by Misc.(Non-Drug; Combo Route) route 4 (four) times daily before meals and nightly. e11.65    pen needle, diabetic 32 gauge x 5/32" Ndle USE TO ADMINISTER INSULIN 4 TIMES DAILY.    pen needle, diabetic 32 gauge x 5/32" Ndle use daily as directed     No current facility-administered medications for this visit.        Review of Systems   Constitution: Negative for malaise/fatigue.   Cardiovascular: Negative for chest pain, dyspnea on exertion, irregular heartbeat, leg swelling and palpitations.   Respiratory: Negative for shortness of breath.    Hematologic/Lymphatic: Negative for bleeding problem.   Skin: Negative for rash.   Musculoskeletal: Negative for myalgias.   Gastrointestinal: Negative for hematemesis, hematochezia and nausea.   Genitourinary: Negative for hematuria.   Neurological: Negative for light-headedness.   Psychiatric/Behavioral: Negative for altered " "mental status.   Allergic/Immunologic: Negative for persistent infections.         Objective:          BP (!) 160/80   Pulse (!) 58   Ht 6' 1" (1.854 m)   Wt 89.9 kg (198 lb 3.1 oz)   BMI 26.15 kg/m²     Physical Exam   Constitutional: He is oriented to person, place, and time. He appears well-developed and well-nourished.   HENT:   Head: Normocephalic.   Nose: Nose normal.   Eyes: Pupils are equal, round, and reactive to light.   Cardiovascular: Regular rhythm, S1 normal and S2 normal. Bradycardia present.   No murmur heard.  Pulses:       Radial pulses are 2+ on the right side, and 2+ on the left side.   Pulmonary/Chest: Breath sounds normal. No respiratory distress.   Abdominal: Normal appearance.   Musculoskeletal: Normal range of motion. He exhibits no edema.   Neurological: He is alert and oriented to person, place, and time.   Skin: Skin is warm and dry. No erythema.   Psychiatric: He has a normal mood and affect. His speech is normal and behavior is normal.   Nursing note and vitals reviewed.    Lab Results   Component Value Date     08/19/2019    K 4.4 08/19/2019    MG 1.4 (L) 03/16/2019    BUN 14 08/19/2019    CREATININE 1.4 08/19/2019    ALT 6 (L) 08/01/2019    AST 13 08/01/2019    HGB 13.8 (L) 08/19/2019    HCT 47.4 08/19/2019    HCT 31 (L) 11/05/2016    TSH 1.143 03/15/2019    LDLCALC 99.6 08/01/2019       Recent Labs   Lab 11/05/16  1559 03/15/19  1837 08/19/19  0653   INR 0.9 1.0 1.1       Assessment:     1. Persistent atrial fibrillation    2. Essential hypertension    3. Anticoagulant long-term use    4. Encounter for monitoring flecainide therapy    5. History of cardioversion      Plan:     In summary, Mr. Zamorano is a 63 y.o. male with DM, CKD, HFpEF, AF here for follow up after cardioversion.  He is doing well from a rhythm perspective, maintaining sinus rhythm on flecainide one month s/p cardioversion. Significant symptomatic improvement. Ravi Zamorano's [id number] Score is 4 " (CAD, HTN, DM, CHF) and he should remain on OAC  - he is taking eliquis for CVA prophylaxis. His BP is elevated and his meds are being up-titrated by general cardiology. He eats a high salt diet - hilton this morning. DASH plan provided.    Continue current medications.  RTC 6 months, sooner if needed.    *A copy of this note has been sent to Dr. Bowden*    Follow up in about 6 months (around 4/3/2020).    ------------------------------------------------------------------    REYES Flynn, NP-C  Cardiac Electrophysiology

## 2019-10-03 ENCOUNTER — OFFICE VISIT (OUTPATIENT)
Dept: ELECTROPHYSIOLOGY | Facility: CLINIC | Age: 64
End: 2019-10-03
Payer: COMMERCIAL

## 2019-10-03 ENCOUNTER — HOSPITAL ENCOUNTER (OUTPATIENT)
Dept: CARDIOLOGY | Facility: CLINIC | Age: 64
Discharge: HOME | End: 2019-10-03
Payer: COMMERCIAL

## 2019-10-03 VITALS
WEIGHT: 198.19 LBS | HEART RATE: 58 BPM | BODY MASS INDEX: 26.27 KG/M2 | HEIGHT: 73 IN | SYSTOLIC BLOOD PRESSURE: 160 MMHG | DIASTOLIC BLOOD PRESSURE: 80 MMHG

## 2019-10-03 DIAGNOSIS — Z51.81 ENCOUNTER FOR MONITORING FLECAINIDE THERAPY: ICD-10-CM

## 2019-10-03 DIAGNOSIS — Z92.89 HISTORY OF CARDIOVERSION: ICD-10-CM

## 2019-10-03 DIAGNOSIS — I10 ESSENTIAL HYPERTENSION: ICD-10-CM

## 2019-10-03 DIAGNOSIS — I48.19 PERSISTENT ATRIAL FIBRILLATION: Primary | ICD-10-CM

## 2019-10-03 DIAGNOSIS — Z79.899 ENCOUNTER FOR MONITORING FLECAINIDE THERAPY: ICD-10-CM

## 2019-10-03 DIAGNOSIS — Z79.01 ANTICOAGULANT LONG-TERM USE: ICD-10-CM

## 2019-10-03 DIAGNOSIS — I50.32 CHRONIC DIASTOLIC CONGESTIVE HEART FAILURE: ICD-10-CM

## 2019-10-03 PROCEDURE — 93010 ELECTROCARDIOGRAM REPORT: CPT | Mod: S$GLB,,, | Performed by: INTERNAL MEDICINE

## 2019-10-03 PROCEDURE — 3077F SYST BP >= 140 MM HG: CPT | Mod: CPTII,S$GLB,, | Performed by: NURSE PRACTITIONER

## 2019-10-03 PROCEDURE — 93005 ELECTROCARDIOGRAM TRACING: CPT | Mod: S$GLB,,, | Performed by: INTERNAL MEDICINE

## 2019-10-03 PROCEDURE — 93005 RHYTHM STRIP: ICD-10-PCS | Mod: S$GLB,,, | Performed by: INTERNAL MEDICINE

## 2019-10-03 PROCEDURE — 3077F PR MOST RECENT SYSTOLIC BLOOD PRESSURE >= 140 MM HG: ICD-10-PCS | Mod: CPTII,S$GLB,, | Performed by: NURSE PRACTITIONER

## 2019-10-03 PROCEDURE — 99214 PR OFFICE/OUTPT VISIT, EST, LEVL IV, 30-39 MIN: ICD-10-PCS | Mod: S$GLB,,, | Performed by: NURSE PRACTITIONER

## 2019-10-03 PROCEDURE — 93010 RHYTHM STRIP: ICD-10-PCS | Mod: S$GLB,,, | Performed by: INTERNAL MEDICINE

## 2019-10-03 PROCEDURE — 3079F DIAST BP 80-89 MM HG: CPT | Mod: CPTII,S$GLB,, | Performed by: NURSE PRACTITIONER

## 2019-10-03 PROCEDURE — 3008F BODY MASS INDEX DOCD: CPT | Mod: CPTII,S$GLB,, | Performed by: NURSE PRACTITIONER

## 2019-10-03 PROCEDURE — 3079F PR MOST RECENT DIASTOLIC BLOOD PRESSURE 80-89 MM HG: ICD-10-PCS | Mod: CPTII,S$GLB,, | Performed by: NURSE PRACTITIONER

## 2019-10-03 PROCEDURE — 99999 PR PBB SHADOW E&M-EST. PATIENT-LVL III: CPT | Mod: PBBFAC,,, | Performed by: NURSE PRACTITIONER

## 2019-10-03 PROCEDURE — 99999 PR PBB SHADOW E&M-EST. PATIENT-LVL III: ICD-10-PCS | Mod: PBBFAC,,, | Performed by: NURSE PRACTITIONER

## 2019-10-03 PROCEDURE — 3008F PR BODY MASS INDEX (BMI) DOCUMENTED: ICD-10-PCS | Mod: CPTII,S$GLB,, | Performed by: NURSE PRACTITIONER

## 2019-10-03 PROCEDURE — 99214 OFFICE O/P EST MOD 30 MIN: CPT | Mod: S$GLB,,, | Performed by: NURSE PRACTITIONER

## 2019-10-03 NOTE — PATIENT INSTRUCTIONS
Try [employer] Salt Free Seasoning    Eating Heart-Healthy Food: Using the DASH Plan    Eating for your heart doesnt have to be hard or boring. You just need to know how to make healthier choices. The DASH eating plan has been developed to help you do just that. DASH stands for Dietary Approaches to Stop Hypertension. It is a plan that has been proven to be healthier for your heart and to lower your risk for high blood pressure. It can also help lower your risk for cancer, heart disease, osteoporosis, and diabetes.  Choosing from each food group  Choose foods from each of the food groups below each day. Try to get the recommended number of servings for each food group. The serving numbers are based on a diet of 2,000 calories a day. Talk to your doctor if youre unsure about your calorie needs. Along with getting the correct servings, the DASH plan also recommends a sodium intake less than 2,300 mg per day.        Grains  Servings: 6 to 8 a day  A serving is:  · 1 slice bread  · 1 ounce dry cereal  · Half a cup cooked rice, pasta or cereal  Best choices: Whole grains and any grains high in fiber. Vegetables  Servings: 4 to 5 a day  A serving is:  · 1 cup raw leafy vegetable  · Half a cup cut-up raw or cooked vegetable  · Half a cup vegetable juice  Best choices: Fresh or frozen vegetables prepared without added salt or fat.   Fruits  Servings: 4 to 5 a day  A serving is:  · 1 medium fruit  · One-quarter cup dried fruit  · Half a cup fresh, frozen, or canned fruit  · Half a cup of 100% fruit juices  Best choices: A variety of fresh fruits of different colors. Whole fruits are a better choice than fruit juices. Low-fat or fat-free dairy  Servings: 2 to 3 a day  A serving is:  · 1 cup milk  · 1 cup yogurt  · One and a half ounces cheese  Best choices: Skim or 1% milk, low-fat or fat-free yogurt or buttermilk, and low-fat cheeses.         Lean meats, poultry, fish  Servings: 6 or fewer a day  A serving  is:  · 1 ounce cooked meats, poultry, or fish  · 1 egg  Best choices: Lean poultry and fish. Trim away visible fat. Broil, grill, roast, or boil instead of frying. Remove skin from poultry before eating. Limit how much red meat you eat.  Nuts, seeds, beans  Servings: 4 to 5 a week  A serving is:  · One-third cup nuts (one and a half ounces)  · 2 tablespoons nut butter or seeds  · Half a cup cooked dry beans or legumes  Best choices: Dry roasted nuts with no salt added, lentils, kidney beans, garbanzo beans, and whole bass beans.   Fats and oils  Servings: 2 to 3 a day  A serving is:  · 1 teaspoon vegetable oil  · 1 teaspoon soft margarine  · 1 tablespoon mayonnaise  · 2 tablespoons salad dressing  Best choices: Nut and vegetable oils (nontropical vegetable oils), such as olive and canola oil. Sweets  Servings: 5 a week or fewer  A serving is:  · 1 tablespoon sugar, maple syrup, or honey  · 1 tablespoon jam or jelly  · 1 half-ounce jelly beans (about 15)  · 1 cup lemonade  Best choices: Dried fruit can be a satisfying sweet. Choose low-fat sweets. And watch your serving sizes!      For more on the DASH eating plan, visit:  www.nhlbi.nih.gov/health/health-topics/topics/dash   Date Last Reviewed: 6/1/2016 © 2000-2017 [employer]. [address] [place] [address], [place] [address]. All rights reserved. This information is not intended as a substitute for professional medical care. Always follow your healthcare professional's instructions.

## 2019-10-14 DIAGNOSIS — Z94.0 KIDNEY REPLACED BY TRANSPLANT: Primary | ICD-10-CM

## 2019-10-21 ENCOUNTER — TELEPHONE (OUTPATIENT)
Dept: TRANSPLANT | Facility: CLINIC | Age: 64
End: 2019-10-21

## 2019-10-23 ENCOUNTER — LAB VISIT (OUTPATIENT)
Dept: LAB | Facility: HOSPITAL | Age: 64
End: 2019-10-23
Attending: INTERNAL MEDICINE
Payer: COMMERCIAL

## 2019-10-23 ENCOUNTER — PATIENT OUTREACH (OUTPATIENT)
Dept: ADMINISTRATIVE | Facility: OTHER | Age: 64
End: 2019-10-23

## 2019-10-23 ENCOUNTER — TELEPHONE (OUTPATIENT)
Dept: INTERNAL MEDICINE | Facility: CLINIC | Age: 64
End: 2019-10-23

## 2019-10-23 DIAGNOSIS — Z94.0 KIDNEY REPLACED BY TRANSPLANT: ICD-10-CM

## 2019-10-23 DIAGNOSIS — E11.3393 TYPE 2 DIABETES MELLITUS WITH BOTH EYES AFFECTED BY MODERATE NONPROLIFERATIVE RETINOPATHY WITHOUT MACULAR EDEMA, WITHOUT LONG-TERM CURRENT USE OF INSULIN: ICD-10-CM

## 2019-10-23 DIAGNOSIS — E78.5 HYPERLIPIDEMIA, UNSPECIFIED HYPERLIPIDEMIA TYPE: ICD-10-CM

## 2019-10-23 LAB
ALBUMIN SERPL BCP-MCNC: 3.8 G/DL (ref 3.5–5.2)
ALP SERPL-CCNC: 80 U/L (ref 55–135)
ALP SERPL-CCNC: 80 U/L (ref 55–135)
ALT SERPL W/O P-5'-P-CCNC: <5 U/L (ref 10–44)
ALT SERPL W/O P-5'-P-CCNC: <5 U/L (ref 10–44)
ANION GAP SERPL CALC-SCNC: 10 MMOL/L (ref 8–16)
ANION GAP SERPL CALC-SCNC: 10 MMOL/L (ref 8–16)
AST SERPL-CCNC: 13 U/L (ref 10–40)
AST SERPL-CCNC: 13 U/L (ref 10–40)
BASOPHILS # BLD AUTO: 0.04 K/UL (ref 0–0.2)
BASOPHILS NFR BLD: 0.9 % (ref 0–1.9)
BILIRUB DIRECT SERPL-MCNC: 0.2 MG/DL (ref 0.1–0.3)
BILIRUB SERPL-MCNC: 0.4 MG/DL (ref 0.1–1)
BILIRUB SERPL-MCNC: 0.4 MG/DL (ref 0.1–1)
BUN SERPL-MCNC: 12 MG/DL (ref 8–23)
BUN SERPL-MCNC: 12 MG/DL (ref 8–23)
CALCIUM SERPL-MCNC: 9.5 MG/DL (ref 8.7–10.5)
CALCIUM SERPL-MCNC: 9.5 MG/DL (ref 8.7–10.5)
CHLORIDE SERPL-SCNC: 105 MMOL/L (ref 95–110)
CHLORIDE SERPL-SCNC: 105 MMOL/L (ref 95–110)
CHOLEST SERPL-MCNC: 151 MG/DL (ref 120–199)
CHOLEST/HDLC SERPL: 4.1 {RATIO} (ref 2–5)
CO2 SERPL-SCNC: 29 MMOL/L (ref 23–29)
CO2 SERPL-SCNC: 29 MMOL/L (ref 23–29)
CREAT SERPL-MCNC: 1.1 MG/DL (ref 0.5–1.4)
CREAT SERPL-MCNC: 1.1 MG/DL (ref 0.5–1.4)
DIFFERENTIAL METHOD: ABNORMAL
EOSINOPHIL # BLD AUTO: 0.1 K/UL (ref 0–0.5)
EOSINOPHIL NFR BLD: 2.4 % (ref 0–8)
ERYTHROCYTE [DISTWIDTH] IN BLOOD BY AUTOMATED COUNT: 16.9 % (ref 11.5–14.5)
EST. GFR  (AFRICAN AMERICAN): >60 ML/MIN/1.73 M^2
EST. GFR  (AFRICAN AMERICAN): >60 ML/MIN/1.73 M^2
EST. GFR  (NON AFRICAN AMERICAN): >60 ML/MIN/1.73 M^2
EST. GFR  (NON AFRICAN AMERICAN): >60 ML/MIN/1.73 M^2
ESTIMATED AVG GLUCOSE: 169 MG/DL (ref 68–131)
GLUCOSE SERPL-MCNC: 109 MG/DL (ref 70–110)
GLUCOSE SERPL-MCNC: 109 MG/DL (ref 70–110)
HBA1C MFR BLD HPLC: 7.5 % (ref 4–5.6)
HCT VFR BLD AUTO: 44.4 % (ref 40–54)
HDLC SERPL-MCNC: 37 MG/DL (ref 40–75)
HDLC SERPL: 24.5 % (ref 20–50)
HGB BLD-MCNC: 13.4 G/DL (ref 14–18)
IMM GRANULOCYTES # BLD AUTO: 0.02 K/UL (ref 0–0.04)
IMM GRANULOCYTES NFR BLD AUTO: 0.4 % (ref 0–0.5)
LDLC SERPL CALC-MCNC: 84.8 MG/DL (ref 63–159)
LYMPHOCYTES # BLD AUTO: 1.2 K/UL (ref 1–4.8)
LYMPHOCYTES NFR BLD: 26.4 % (ref 18–48)
MAGNESIUM SERPL-MCNC: 1.3 MG/DL (ref 1.6–2.6)
MCH RBC QN AUTO: 23.8 PG (ref 27–31)
MCHC RBC AUTO-ENTMCNC: 30.2 G/DL (ref 32–36)
MCV RBC AUTO: 79 FL (ref 82–98)
MONOCYTES # BLD AUTO: 0.7 K/UL (ref 0.3–1)
MONOCYTES NFR BLD: 14.4 % (ref 4–15)
NEUTROPHILS # BLD AUTO: 2.5 K/UL (ref 1.8–7.7)
NEUTROPHILS NFR BLD: 55.5 % (ref 38–73)
NONHDLC SERPL-MCNC: 114 MG/DL
NRBC BLD-RTO: 0 /100 WBC
PHOSPHATE SERPL-MCNC: 3.5 MG/DL (ref 2.7–4.5)
PLATELET # BLD AUTO: 154 K/UL (ref 150–350)
PMV BLD AUTO: 12.9 FL (ref 9.2–12.9)
POTASSIUM SERPL-SCNC: 4.1 MMOL/L (ref 3.5–5.1)
POTASSIUM SERPL-SCNC: 4.1 MMOL/L (ref 3.5–5.1)
PROT SERPL-MCNC: 7.5 G/DL (ref 6–8.4)
PROT SERPL-MCNC: 7.5 G/DL (ref 6–8.4)
RBC # BLD AUTO: 5.64 M/UL (ref 4.6–6.2)
SODIUM SERPL-SCNC: 144 MMOL/L (ref 136–145)
SODIUM SERPL-SCNC: 144 MMOL/L (ref 136–145)
TACROLIMUS BLD-MCNC: 7.2 NG/ML (ref 5–15)
TRIGL SERPL-MCNC: 146 MG/DL (ref 30–150)
WBC # BLD AUTO: 4.51 K/UL (ref 3.9–12.7)

## 2019-10-23 PROCEDURE — 85025 COMPLETE CBC W/AUTO DIFF WBC: CPT

## 2019-10-23 PROCEDURE — 80197 ASSAY OF TACROLIMUS: CPT

## 2019-10-23 PROCEDURE — 80069 RENAL FUNCTION PANEL: CPT

## 2019-10-23 PROCEDURE — 80076 HEPATIC FUNCTION PANEL: CPT

## 2019-10-23 PROCEDURE — 36415 COLL VENOUS BLD VENIPUNCTURE: CPT

## 2019-10-23 PROCEDURE — 80053 COMPREHEN METABOLIC PANEL: CPT

## 2019-10-23 PROCEDURE — 80061 LIPID PANEL: CPT

## 2019-10-23 PROCEDURE — 83036 HEMOGLOBIN GLYCOSYLATED A1C: CPT

## 2019-10-23 PROCEDURE — 83735 ASSAY OF MAGNESIUM: CPT

## 2019-10-23 PROCEDURE — 87799 DETECT AGENT NOS DNA QUANT: CPT

## 2019-10-24 ENCOUNTER — TELEPHONE (OUTPATIENT)
Dept: TRANSPLANT | Facility: CLINIC | Age: 64
End: 2019-10-24

## 2019-10-24 DIAGNOSIS — Z92.25 HISTORY OF IMMUNOSUPPRESSION: ICD-10-CM

## 2019-10-24 DIAGNOSIS — Z94.0 KIDNEY REPLACED BY TRANSPLANT: Primary | ICD-10-CM

## 2019-10-24 DIAGNOSIS — R80.9 URINE PROTEIN INCREASED: ICD-10-CM

## 2019-10-24 NOTE — TELEPHONE ENCOUNTER
----- Message from Sylvie Frias MD sent at 10/24/2019  3:20 PM CDT -----  Would repeat UA and UPC next week. UA negative for protein but UPC with proteinuria --> check serum KATIE, SPEP, and UPEP

## 2019-10-24 NOTE — TELEPHONE ENCOUNTER
Patient educated on urine collection clean catch midstream. Patient notified that we need him to stop by the lab to collect a urinal and a 24 hr urine collection jug in order to begin a 24 hr collection next week for drop off, along with a regular UA and blood work. Patient verbalized understanding and will be stopping off Friday for the collection equipment.

## 2019-10-25 ENCOUNTER — TELEPHONE (OUTPATIENT)
Dept: INTERNAL MEDICINE | Facility: CLINIC | Age: 64
End: 2019-10-25

## 2019-10-25 ENCOUNTER — OFFICE VISIT (OUTPATIENT)
Dept: NEPHROLOGY | Facility: CLINIC | Age: 64
End: 2019-10-25
Payer: COMMERCIAL

## 2019-10-25 ENCOUNTER — OFFICE VISIT (OUTPATIENT)
Dept: INTERNAL MEDICINE | Facility: CLINIC | Age: 64
End: 2019-10-25
Payer: COMMERCIAL

## 2019-10-25 VITALS
DIASTOLIC BLOOD PRESSURE: 72 MMHG | BODY MASS INDEX: 27.43 KG/M2 | SYSTOLIC BLOOD PRESSURE: 131 MMHG | HEART RATE: 74 BPM | OXYGEN SATURATION: 95 % | HEIGHT: 73 IN | WEIGHT: 207 LBS

## 2019-10-25 VITALS
HEIGHT: 73 IN | WEIGHT: 207 LBS | OXYGEN SATURATION: 98 % | SYSTOLIC BLOOD PRESSURE: 130 MMHG | BODY MASS INDEX: 27.43 KG/M2 | DIASTOLIC BLOOD PRESSURE: 80 MMHG | HEART RATE: 57 BPM

## 2019-10-25 DIAGNOSIS — N18.2 CHRONIC KIDNEY DISEASE, STAGE II (MILD): Primary | ICD-10-CM

## 2019-10-25 DIAGNOSIS — Z23 NEED FOR SHINGLES VACCINE: ICD-10-CM

## 2019-10-25 DIAGNOSIS — N18.2 CKD (CHRONIC KIDNEY DISEASE) STAGE 2, GFR 60-89 ML/MIN: Chronic | ICD-10-CM

## 2019-10-25 DIAGNOSIS — E11.42 DIABETIC POLYNEUROPATHY ASSOCIATED WITH TYPE 2 DIABETES MELLITUS: ICD-10-CM

## 2019-10-25 DIAGNOSIS — Z79.4 TYPE 2 DIABETES MELLITUS WITH STAGE 2 CHRONIC KIDNEY DISEASE, WITH LONG-TERM CURRENT USE OF INSULIN: Primary | ICD-10-CM

## 2019-10-25 DIAGNOSIS — Z94.0 DECEASED-DONOR KIDNEY TRANSPLANT RECIPIENT: ICD-10-CM

## 2019-10-25 DIAGNOSIS — E78.5 HYPERLIPIDEMIA, UNSPECIFIED HYPERLIPIDEMIA TYPE: ICD-10-CM

## 2019-10-25 DIAGNOSIS — N18.2 TYPE 2 DIABETES MELLITUS WITH STAGE 2 CHRONIC KIDNEY DISEASE, WITH LONG-TERM CURRENT USE OF INSULIN: Primary | ICD-10-CM

## 2019-10-25 DIAGNOSIS — E11.22 TYPE 2 DIABETES MELLITUS WITH STAGE 2 CHRONIC KIDNEY DISEASE, WITH LONG-TERM CURRENT USE OF INSULIN: Primary | ICD-10-CM

## 2019-10-25 PROCEDURE — 3008F PR BODY MASS INDEX (BMI) DOCUMENTED: ICD-10-PCS | Mod: CPTII,S$GLB,, | Performed by: INTERNAL MEDICINE

## 2019-10-25 PROCEDURE — 3079F DIAST BP 80-89 MM HG: CPT | Mod: CPTII,S$GLB,, | Performed by: INTERNAL MEDICINE

## 2019-10-25 PROCEDURE — 3078F DIAST BP <80 MM HG: CPT | Mod: CPTII,S$GLB,, | Performed by: INTERNAL MEDICINE

## 2019-10-25 PROCEDURE — 99214 OFFICE O/P EST MOD 30 MIN: CPT | Mod: S$GLB,,, | Performed by: INTERNAL MEDICINE

## 2019-10-25 PROCEDURE — 99999 PR PBB SHADOW E&M-EST. PATIENT-LVL V: CPT | Mod: PBBFAC,,, | Performed by: INTERNAL MEDICINE

## 2019-10-25 PROCEDURE — 99999 PR PBB SHADOW E&M-EST. PATIENT-LVL IV: CPT | Mod: PBBFAC,,, | Performed by: INTERNAL MEDICINE

## 2019-10-25 PROCEDURE — 3075F PR MOST RECENT SYSTOLIC BLOOD PRESS GE 130-139MM HG: ICD-10-PCS | Mod: CPTII,S$GLB,, | Performed by: INTERNAL MEDICINE

## 2019-10-25 PROCEDURE — 99214 PR OFFICE/OUTPT VISIT, EST, LEVL IV, 30-39 MIN: ICD-10-PCS | Mod: S$GLB,,, | Performed by: INTERNAL MEDICINE

## 2019-10-25 PROCEDURE — 99999 PR PBB SHADOW E&M-EST. PATIENT-LVL V: ICD-10-PCS | Mod: PBBFAC,,, | Performed by: INTERNAL MEDICINE

## 2019-10-25 PROCEDURE — 3078F PR MOST RECENT DIASTOLIC BLOOD PRESSURE < 80 MM HG: ICD-10-PCS | Mod: CPTII,S$GLB,, | Performed by: INTERNAL MEDICINE

## 2019-10-25 PROCEDURE — 3075F SYST BP GE 130 - 139MM HG: CPT | Mod: CPTII,S$GLB,, | Performed by: INTERNAL MEDICINE

## 2019-10-25 PROCEDURE — 3008F BODY MASS INDEX DOCD: CPT | Mod: CPTII,S$GLB,, | Performed by: INTERNAL MEDICINE

## 2019-10-25 PROCEDURE — 3079F PR MOST RECENT DIASTOLIC BLOOD PRESSURE 80-89 MM HG: ICD-10-PCS | Mod: CPTII,S$GLB,, | Performed by: INTERNAL MEDICINE

## 2019-10-25 PROCEDURE — 99999 PR PBB SHADOW E&M-EST. PATIENT-LVL IV: ICD-10-PCS | Mod: PBBFAC,,, | Performed by: INTERNAL MEDICINE

## 2019-10-25 NOTE — PROGRESS NOTES
Subjective:       Patient ID: Ravi Zamorano is a 63 y.o. Black or  male who presents for re-evaluation of progression of Chronic Kidney Disease    HPI   Mr. Zamorano is a 63 year old man with medical history of diabetes, hypertension, ESRD previously on HD status post  donor kidney transplant (HCV positive donor, patient received Harvoni) 2016 presenting for follow up of allograft function and immunosuppression.  Patient reports doing well, has adequate fluid intake, blood sugars previously elevated non-fasting, now better controlled.  He reports blood pressure 120's systolic at home.  He otherwise denies any fever, chest pain, shortness of breath, abdominal pain, diarrhea, dysuria/hematuria.  He denies any other recent acute illness/infections/injury.    Review of Systems   Constitutional: Negative for appetite change, fatigue and fever.   Respiratory: Negative for cough and shortness of breath.    Cardiovascular: Negative for chest pain and leg swelling.   Gastrointestinal: Negative for abdominal pain, constipation, diarrhea, nausea and vomiting.   Genitourinary: Negative for dysuria, flank pain, frequency, hematuria and urgency.   Musculoskeletal: Negative for arthralgias, back pain and joint swelling.   Skin: Negative for rash.   Neurological: Negative for dizziness and light-headedness.   All other systems reviewed and are negative.      Objective:      Physical Exam   Constitutional: He appears well-developed and well-nourished.   Cardiovascular: Normal rate and regular rhythm. Exam reveals no gallop and no friction rub.   No murmur heard.  Pulmonary/Chest: Effort normal and breath sounds normal. No respiratory distress. He has no wheezes. He has no rales.   Musculoskeletal: He exhibits no edema.   Neurological: He is alert.   Skin: Skin is warm and dry. No rash noted.   Vitals reviewed.      Assessment:       1. Chronic kidney disease, stage II (mild)    2. -donor  kidney transplant recipient        Plan:     Mr. Zamorano is a 63 year old man with medical history of diabetes, hypertension, ESRD previously on HD status post  donor kidney transplant (HCV positive donor, patient received Harvoni) 2016 presenting for follow up of allograft function and immunosuppression.  Patient creatinine stable, will continue to trend (along with proteinuria, will trend with better glycemic control).  Stressed importance of blood pressure/glycemic control to prevent any further progression of kidney disease, patient voiced understanding.   - Immunosuppression: per Transplant  - Anemia: Hgb at goal, no indication for erythropoetin therapy  - Bone/mineral metabolism: patient with secondary hyperparathyroidism, PTH at goal for stage CKD    Return to clinic in 6 months with renal/heme panel, iron/TIBC/ferritin, urinalysis/culture, urine protein/creatinine ratio, PTH/VitD prior to next visit

## 2019-10-25 NOTE — PROGRESS NOTES
"Subjective:       Patient ID: Ravi Zamorano is a 63 y.o. male.    Chief Complaint: Follow-up and Hyperglycemia (patient brought BG log, numbers were 98, 221, 134, 114. is recording before& after breakfast and after lunch.)    HPI   63M here for follow up of Diabetes also w CKD 2, hx kidney transplant.     His a1c this week is 7.5% down from 8.6%.    Gabapentin 300mg am and midday; 900mg qhs  levemir 35 u qhs  humalog 18u bf, 16 u lunch and dinner  Nifedipine   carvedilol  Hydralazine    Review of Systems   Constitutional: Negative for fever.   HENT: Negative.    Eyes: Negative.    Respiratory: Negative for shortness of breath.    Cardiovascular: Negative for chest pain and leg swelling.   Gastrointestinal: Negative for abdominal pain, diarrhea, nausea and vomiting.   Genitourinary: Negative.    Musculoskeletal: Negative for arthralgias.   Skin: Negative for rash.   Psychiatric/Behavioral: Negative.        Objective:   /72 (BP Location: Left arm, Patient Position: Sitting, BP Method: Large (Manual))   Pulse 74   Ht 6' 1" (1.854 m)   Wt 93.9 kg (207 lb)   SpO2 95%   BMI 27.31 kg/m²      Physical Exam   Constitutional: He is oriented to person, place, and time. He appears well-developed and well-nourished.   HENT:   Head: Normocephalic and atraumatic.   Eyes: Pupils are equal, round, and reactive to light. Conjunctivae and EOM are normal.   Neck: Neck supple. No thyromegaly present.   Cardiovascular: Normal rate, regular rhythm and normal heart sounds.   No murmur heard.  Pulmonary/Chest: Effort normal and breath sounds normal. No respiratory distress. He has no wheezes.   Abdominal: Soft. Bowel sounds are normal. He exhibits no distension. There is no tenderness.   Musculoskeletal: Normal range of motion.   Neurological: He is alert and oriented to person, place, and time.   Skin: Skin is warm and dry. No rash noted.   Psychiatric: He has a normal mood and affect. Judgment and thought content normal. "   Vitals reviewed.      Assessment:       1. Type 2 diabetes mellitus with stage 2 chronic kidney disease, with long-term current use of insulin    2. CKD (chronic kidney disease) stage 2, GFR 60-89 ml/min    3. Hyperlipidemia, unspecified hyperlipidemia type    4. Diabetic polyneuropathy associated with type 2 diabetes mellitus    5. Need for shingles vaccine        Plan:       Ravi was seen today for follow-up and hyperglycemia.    Diagnoses and all orders for this visit:    Type 2 diabetes mellitus with stage 2 chronic kidney disease, with long-term current use of insulin  -     Near goal, continue current meds   Ambulatory referral to Optometry  -    Labs in 3 mo:   Hemoglobin A1c; Future  -     Comprehensive metabolic panel; Future    CKD (chronic kidney disease) stage 2, GFR 60-89 ml/min  Kidney transplant patient  Followed by transplant     Hyperlipidemia, unspecified hyperlipidemia type  Lipids at goal, monitor    Diabetic polyneuropathy associated with type 2 diabetes mellitus  Continue gabapentin    Need for shingles vaccine  -     Zoster Recombinant Vaccine; Standing  -     Ambulatory referral to Infectious Disease Injection Dept

## 2019-10-25 NOTE — TELEPHONE ENCOUNTER
----- Message from Pao Villafana sent at 10/25/2019  2:50 PM CDT -----  Contact: Patient [phone]  Patient saw Dr. Mack on this morning and is requesting a call if he can drink apple cider vinager.    Please call and advise.    Thank You

## 2019-10-29 ENCOUNTER — PATIENT OUTREACH (OUTPATIENT)
Dept: ADMINISTRATIVE | Facility: OTHER | Age: 64
End: 2019-10-29

## 2019-10-30 ENCOUNTER — CLINICAL SUPPORT (OUTPATIENT)
Dept: INFECTIOUS DISEASES | Facility: CLINIC | Age: 64
End: 2019-10-30
Payer: COMMERCIAL

## 2019-10-30 ENCOUNTER — LAB VISIT (OUTPATIENT)
Dept: LAB | Facility: HOSPITAL | Age: 64
End: 2019-10-30
Attending: INTERNAL MEDICINE
Payer: COMMERCIAL

## 2019-10-30 DIAGNOSIS — R80.9 URINE PROTEIN INCREASED: ICD-10-CM

## 2019-10-30 DIAGNOSIS — Z94.0 KIDNEY REPLACED BY TRANSPLANT: ICD-10-CM

## 2019-10-30 DIAGNOSIS — Z23 NEED FOR SHINGLES VACCINE: ICD-10-CM

## 2019-10-30 LAB
AMORPH CRY UR QL COMP ASSIST: NORMAL
BACTERIA #/AREA URNS AUTO: NORMAL /HPF
BILIRUB UR QL STRIP: NEGATIVE
CLARITY UR REFRACT.AUTO: CLEAR
COLOR UR AUTO: YELLOW
CREAT UR-MCNC: 46 MG/DL (ref 23–375)
GLUCOSE UR QL STRIP: NEGATIVE
HGB UR QL STRIP: NEGATIVE
HYALINE CASTS UR QL AUTO: 0 /LPF
KETONES UR QL STRIP: NEGATIVE
LEUKOCYTE ESTERASE UR QL STRIP: NEGATIVE
MICROSCOPIC COMMENT: NORMAL
NITRITE UR QL STRIP: NEGATIVE
PH UR STRIP: 8 [PH] (ref 5–8)
PROT UR QL STRIP: ABNORMAL
PROT UR-MCNC: 26 MG/DL (ref 0–15)
PROT/CREAT UR: 0.57 MG/G{CREAT} (ref 0–0.2)
RBC #/AREA URNS AUTO: 2 /HPF (ref 0–4)
SP GR UR STRIP: 1.01 (ref 1–1.03)
URN SPEC COLLECT METH UR: ABNORMAL
WBC #/AREA URNS AUTO: 0 /HPF (ref 0–5)

## 2019-10-30 PROCEDURE — 90750 ZOSTER RECOMBINANT VACCINE: ICD-10-PCS | Mod: S$GLB,,, | Performed by: INTERNAL MEDICINE

## 2019-10-30 PROCEDURE — 90750 HZV VACC RECOMBINANT IM: CPT | Mod: S$GLB,,, | Performed by: INTERNAL MEDICINE

## 2019-10-30 PROCEDURE — 81001 URINALYSIS AUTO W/SCOPE: CPT

## 2019-10-30 PROCEDURE — 84156 ASSAY OF PROTEIN URINE: CPT

## 2019-10-30 PROCEDURE — 90471 IMMUNIZATION ADMIN: CPT | Mod: S$GLB,,, | Performed by: INTERNAL MEDICINE

## 2019-10-30 PROCEDURE — 90471 ZOSTER RECOMBINANT VACCINE: ICD-10-PCS | Mod: S$GLB,,, | Performed by: INTERNAL MEDICINE

## 2019-10-31 ENCOUNTER — OFFICE VISIT (OUTPATIENT)
Dept: CARDIOLOGY | Facility: CLINIC | Age: 64
End: 2019-10-31
Payer: COMMERCIAL

## 2019-10-31 VITALS
SYSTOLIC BLOOD PRESSURE: 148 MMHG | DIASTOLIC BLOOD PRESSURE: 82 MMHG | HEART RATE: 68 BPM | HEIGHT: 73 IN | BODY MASS INDEX: 27.29 KG/M2 | WEIGHT: 205.94 LBS

## 2019-10-31 DIAGNOSIS — I10 ESSENTIAL HYPERTENSION: ICD-10-CM

## 2019-10-31 DIAGNOSIS — I48.19 PERSISTENT ATRIAL FIBRILLATION: ICD-10-CM

## 2019-10-31 DIAGNOSIS — E78.5 HYPERLIPIDEMIA, UNSPECIFIED HYPERLIPIDEMIA TYPE: Primary | ICD-10-CM

## 2019-10-31 PROCEDURE — 99999 PR PBB SHADOW E&M-EST. PATIENT-LVL V: ICD-10-PCS | Mod: PBBFAC,,, | Performed by: STUDENT IN AN ORGANIZED HEALTH CARE EDUCATION/TRAINING PROGRAM

## 2019-10-31 PROCEDURE — 99999 PR PBB SHADOW E&M-EST. PATIENT-LVL V: CPT | Mod: PBBFAC,,, | Performed by: STUDENT IN AN ORGANIZED HEALTH CARE EDUCATION/TRAINING PROGRAM

## 2019-10-31 PROCEDURE — 99214 PR OFFICE/OUTPT VISIT, EST, LEVL IV, 30-39 MIN: ICD-10-PCS | Mod: S$GLB,,, | Performed by: STUDENT IN AN ORGANIZED HEALTH CARE EDUCATION/TRAINING PROGRAM

## 2019-10-31 PROCEDURE — 3077F PR MOST RECENT SYSTOLIC BLOOD PRESSURE >= 140 MM HG: ICD-10-PCS | Mod: CPTII,S$GLB,, | Performed by: STUDENT IN AN ORGANIZED HEALTH CARE EDUCATION/TRAINING PROGRAM

## 2019-10-31 PROCEDURE — 3077F SYST BP >= 140 MM HG: CPT | Mod: CPTII,S$GLB,, | Performed by: STUDENT IN AN ORGANIZED HEALTH CARE EDUCATION/TRAINING PROGRAM

## 2019-10-31 PROCEDURE — 99214 OFFICE O/P EST MOD 30 MIN: CPT | Mod: S$GLB,,, | Performed by: STUDENT IN AN ORGANIZED HEALTH CARE EDUCATION/TRAINING PROGRAM

## 2019-10-31 PROCEDURE — 3008F PR BODY MASS INDEX (BMI) DOCUMENTED: ICD-10-PCS | Mod: CPTII,S$GLB,, | Performed by: STUDENT IN AN ORGANIZED HEALTH CARE EDUCATION/TRAINING PROGRAM

## 2019-10-31 PROCEDURE — 3079F DIAST BP 80-89 MM HG: CPT | Mod: CPTII,S$GLB,, | Performed by: STUDENT IN AN ORGANIZED HEALTH CARE EDUCATION/TRAINING PROGRAM

## 2019-10-31 PROCEDURE — 3079F PR MOST RECENT DIASTOLIC BLOOD PRESSURE 80-89 MM HG: ICD-10-PCS | Mod: CPTII,S$GLB,, | Performed by: STUDENT IN AN ORGANIZED HEALTH CARE EDUCATION/TRAINING PROGRAM

## 2019-10-31 PROCEDURE — 3008F BODY MASS INDEX DOCD: CPT | Mod: CPTII,S$GLB,, | Performed by: STUDENT IN AN ORGANIZED HEALTH CARE EDUCATION/TRAINING PROGRAM

## 2019-10-31 NOTE — PROGRESS NOTES
Cardiology Clinic Note  Reason for Visit: 1 month f/u    HPI:   Mr. Ravi Zamorano is a 63 y.o. gentleman with history of pAF (dx this year), controlled DM, CKD 2, hx of ESRD previously on HD status post  donor kidney transplant, DD here as a one month f/u for HTN. He was previously seen by me for new onset afib. I started him on Eliquis 5 mg BID for a CHADS-VASC score of 2. He underwent DCCV successfully and was started on Flecainide. He is here mainly for HTN control. He has a hx of dietary non-compliance with salty foods.    He reports that today his BP runs 120-140 SBP at home. He reports that he cannot remember if he took his BP medication this morning. Still eats a little salty food now and then.       Denies leg swelling, cough, orthopnea/PND, LOC, claudication, syncope.    Denies any CVA, MI in the past.    Prior cardiac hx: (other than stated above):  Notably his past medical hx shows that he had a prior DSE in  which showed no evidence of WMA but showed DD. He had a similar results in . Last EKG was in 2019 which showed NSR with non-specific ST segment abnormalities. Similar findings were seen in 2016. Recent DSE was negative, done prior to initiation of flecainide.    TSH 2019 - 1.14     Last LDL in 10/2019: 84    Medications:  Aspirin 81 mg daily  lipitor 40 mg daily  Coreg 25 mg BID  Eliquis 5 mg BID  Flecainide 100 mg BID  Valsartan 160 mg daily  Nifedipine 30 mg daily      ROS:    Review of Systems   Constitution: Negative for chills and fever.   HENT: Negative for ear pain.    Cardiovascular:        As per HPI   Respiratory: Negative for cough, hemoptysis and shortness of breath.    Musculoskeletal: Negative for back pain.   Gastrointestinal: Negative for abdominal pain, nausea and vomiting.   Neurological: Negative for focal weakness, headaches and weakness.     PMH:     Past Medical History:   Diagnosis Date    Anxiety 2014    Arthritis     Bilateral diabetic  retinopathy 2017    CKD (chronic kidney disease) stage 2, GFR 60-89 ml/min 12/28/2016    CKD (chronic kidney disease) stage 4, GFR 15-29 ml/min 7/29/2014    Colon polyps 2014    Depression - situational 7/29/2014    Diabetes mellitus     Diabetes type 2 since 2000 7/29/2014    Diabetic neuropathy 7/29/2014    History of hepatitis C, s/p successful Rx w/ SVR24 - 9/2017 7/29/2014    Genotype 1a, treatment naive 10/2014 liver biopsy - grade 1 / stage 1 Completed Harvoni w/ SVR    Hyperlipidemia 7/29/2014    Hypertension     Neuropathy     Organ transplant candidate 7/29/2014    Pancreatitis      Past Surgical History:   Procedure Laterality Date    APPENDECTOMY      KIDNEY TRANSPLANT      TRANSESOPHAGEAL ECHOCARDIOGRAPHY N/A 8/26/2019    Procedure: ECHOCARDIOGRAM, TRANSESOPHAGEAL;  Surgeon: Dolores Diagnostic Provider;  Location: [facility] EP LAB;  Service: Cardiology;  Laterality: N/A;    TREATMENT OF CARDIAC ARRHYTHMIA N/A 8/26/2019    Procedure: CARDIOVERSION;  Surgeon: Bronson Bowden MD;  Location: [facility] EP LAB;  Service: Cardiology;  Laterality: N/A;  AF, FELICIANO, DCCV, MAC, GP, 3 PREP     Allergies:   Review of patient's allergies indicates:  No Known Allergies  Medications:     Current Outpatient Medications on File Prior to Visit   Medication Sig Dispense Refill    apixaban (ELIQUIS) 5 mg Tab Take 1 tablet (5 mg total) by mouth 2 (two) times daily. 60 tablet 11    aspirin 81 MG Chew Take 81 mg by mouth once daily.      atorvastatin (LIPITOR) 40 MG tablet Take 1 tablet (40 mg total) by mouth once daily. 90 tablet 3    blood sugar diagnostic Strp 1 strip by Misc.(Non-Drug; Combo Route) route 4 (four) times daily before meals and nightly. e11.65 100 strip 5    blood sugar diagnostic Strp test blood sugar four times daily 200 each 6    blood-glucose meter (FREESTYLE SYSTEM KIT) kit Use as instructed e11.65 may substitute meter as covered by insurance 1 each 0    carvedilol (COREG) 25 MG tablet Take 1  "tablet (25 mg total) by mouth 2 (two) times daily. (Patient taking differently: Take 25 mg by mouth 2 (two) times daily. ) 60 tablet 4    famotidine (PEPCID) 20 MG tablet Take 1 tablet (20 mg total) by mouth every evening. 90 tablet 3    flecainide (TAMBOCOR) 100 MG Tab Take 1 tablet (100 mg total) by mouth every 12 (twelve) hours. 60 tablet 3    gabapentin (NEURONTIN) 300 MG capsule Take 1 tablet in the morning; 1 tablet midday and 3 tablets at night. 450 capsule 3    insulin detemir U-100 (LEVEMIR FLEXTOUCH) 100 unit/mL (3 mL) SubQ InPn pen Inject 35 Units into the skin every evening. 15 mL 4    insulin lispro (HUMALOG KWIKPEN INSULIN) 100 unit/mL pen Inject 18 units w/ breakfast, 16 units w/ lunch and dinner plus scale 150-200 +2, 201-250 +4, 251-300 +6, 301-350 +8. 30 mL 4    lancets Misc 1 each by Misc.(Non-Drug; Combo Route) route 4 (four) times daily before meals and nightly. e11.65 100 each 0    meclizine (ANTIVERT) 25 mg tablet Take 1 tablet (25 mg total) by mouth 3 (three) times daily as needed for Dizziness. 21 tablet 0    melatonin 5 mg Tab Take 1 tablet (5 mg total) by mouth every evening.      mycophenolate (CELLCEPT) 250 mg Cap Take 4 capsules (1,000 mg total) by mouth 2 (two) times daily. 240 capsule 11    NIFEdipine (PROCARDIA-XL) 30 MG (OSM) 24 hr tablet Take 1 tablet (30 mg total) by mouth once daily. 30 tablet 11    pen needle, diabetic 32 gauge x 5/32" Ndle USE TO ADMINISTER INSULIN 4 TIMES DAILY. 400 each 3    pen needle, diabetic 32 gauge x 5/32" Ndle use daily as directed 100 each 1    predniSONE (DELTASONE) 5 MG tablet Take 1 tablet (5 mg total) by mouth once daily. 30 tablet 11    tacrolimus (PROGRAF) 0.5 MG Cap Take 1mg (2 capsules)  orally in the am and 1 mg (2 capsule) orally in the pm 120 capsule 11    valsartan (DIOVAN) 160 MG tablet Take 1 tablet (160 mg total) by mouth once daily. 90 tablet 3     No current facility-administered medications on file prior to visit.  " "    Social History:     Social History     Tobacco Use    Smoking status: Former Smoker     Packs/day: 0.50     Years: 32.00     Pack years: 16.00     Types: Cigarettes     Last attempt to quit: 2016     Years since quittin.9    Smokeless tobacco: Never Used    Tobacco comment: Pt reports that he quit in , but started up again in . pt reports he is currently working on quitting again   Substance Use Topics    Alcohol use: Yes     Comment: Pt reports occasional beers on Sundays. Pt reports drinking daily prior to ESRD diagnosis.     Family History:     Family History   Problem Relation Age of Onset    Diabetes Mother     Hypertension Mother     Heart disease Mother         CAD with PCI    Heart disease Father     Cancer Brother         one sister with breast cancer    Hypertension Brother         one sister with HTN and borderline DM    Stroke Neg Hx     Kidney disease Neg Hx      Physical Exam:   BP (!) 148/82 (BP Location: Left arm, Patient Position: Sitting, BP Method: Medium (Automatic))   Pulse 68   Ht 6' 1" (1.854 m)   Wt 93.4 kg (205 lb 14.6 oz)   BMI 27.17 kg/m²          Physical Exam   Constitutional: He is oriented to person, place, and time. He appears well-developed and well-nourished.   HENT:   Head: Normocephalic and atraumatic.   Neck: Normal range of motion. Neck supple. No JVD present.   Cardiovascular: Normal rate, regular rhythm, normal heart sounds and intact distal pulses.   DP and PT intact bilaterally     Pulmonary/Chest: Effort normal and breath sounds normal. No respiratory distress. He has no wheezes.   Abdominal: Soft. Bowel sounds are normal. He exhibits no distension. There is no tenderness.   Musculoskeletal: He exhibits no edema.   Neurological: He is alert and oriented to person, place, and time.   Skin: Skin is warm and dry. No rash noted.   Vitals reviewed.      Labs:     Lab Results   Component Value Date     10/23/2019     10/23/2019    " K 4.1 10/23/2019    K 4.1 10/23/2019     10/23/2019     10/23/2019    CO2 29 10/23/2019    CO2 29 10/23/2019    BUN 12 10/23/2019    BUN 12 10/23/2019    CREATININE 1.1 10/23/2019    CREATININE 1.1 10/23/2019    ANIONGAP 10 10/23/2019    ANIONGAP 10 10/23/2019     Lab Results   Component Value Date    HGBA1C 7.5 (H) 10/23/2019     Lab Results   Component Value Date     (H) 10/23/2014    Lab Results   Component Value Date    WBC 4.51 10/23/2019    HGB 13.4 (L) 10/23/2019    HCT 44.4 10/23/2019    HCT 31 (L) 2016     10/23/2019    GRAN 2.5 10/23/2019    GRAN 55.5 10/23/2019     Lab Results   Component Value Date    CHOL 151 10/23/2019    HDL 37 (L) 10/23/2019    LDLCALC 84.8 10/23/2019    TRIG 146 10/23/2019          Imaging:     DSE 2016:  CONCLUSIONS     1 - Enlarged ascending aorta.     2 - Concentric hypertrophy.     3 - Normal left ventricular systolic function (EF 60-65%).     4 - Left ventricular diastolic dysfunction.     5 - Mild mitral regurgitation.     6 - The estimated PA systolic pressure is greater than 21 mmHg.     7 - Normal right ventricular systolic function .     8 - Right ventricular hypertrophy.     9 - Trivial tricuspid regurgitation.     No evidence of stress induced myocardial ischemia.     DSE 2014:    1 - Concentric hypertrophy.     2 - Normal left ventricular systolic function (EF 60-65%).     3 - Left ventricular diastolic dysfunction.     4 - Normal right ventricular systolic function .     5 - Biatrial enlargement.     6 - Mildly enlarged ascending aorta.     7 - Trivial mitral regurgitation.     8 - Trivial to mild tricuspid regurgitation.     9 - Trivial pericardial effusion.     EK/2019:  SB with 1st degree AV block    Assessment:     1. Afib (CHADS-VASC of 2) - In NSR here. Told patient continue taking his Eliquis and Flecainide.   2. Essential hypertension - At goal at home. Did not take his anti-HTnsive today. Will keep on Valsartan 160 mg  daily.   3. Hyperlipidemia, unspecified hyperlipidemia type - recent LDL is 84 on Lipitor 40 mg. No need to increase      Recommended to the patient to eat a less salty diet. Has hx of Diastolic dysfunction. No hx of HFpEF.     Plan:     See above. No new orders placed    Discussed with Dr. Bowden. RTC in 1 year or earlier if needed.     Signed:  Mishel Posada MD  10/31/2019 5:17 PM

## 2019-11-06 ENCOUNTER — TELEPHONE (OUTPATIENT)
Dept: INTERNAL MEDICINE | Facility: CLINIC | Age: 64
End: 2019-11-06

## 2019-11-06 ENCOUNTER — TELEPHONE (OUTPATIENT)
Dept: TRANSPLANT | Facility: CLINIC | Age: 64
End: 2019-11-06

## 2019-11-06 NOTE — TELEPHONE ENCOUNTER
----- Message from Niyah Auguste sent at 11/6/2019  8:03 AM CST -----  Contact: [facility]/[phone]  Pt called in regards to being concern that he has missed to 3 dosage of his Rx for     tacrolimus (PROGRAF) 0.5 MG Cap 120 capsule 11 1/24/2019  No  Take 1mg (2 capsules)  orally in the am and 1 mg (2 capsule) orally in the pm    And wanted to know how should he take the med's.    Please advise

## 2019-11-06 NOTE — TELEPHONE ENCOUNTER
Patient contacted per his call to us, states he has not received his medication from his mail order pharmacy and that shipment has been delayed. Informed patient that we can give him several doses from our pharmacy to hold him off until his shipment arrives.  I contacted [facility] and they report that his medication was ordered on 10/22, shipped out to him on 11/5 and their system shows delivery at 130pm today.   I asked [facility] to please contact patient because he is stating he still doesn't have his meds, representative stated she would call him.

## 2019-11-06 NOTE — TELEPHONE ENCOUNTER
Spoke to patient. Patient is currently on Prograf. Patient was advised to take 1 capsule po (20 mg total) in the morning and 1 capsule (20 mg) po in the pm. Patient says that he had missed taking both doses on yesterday and on this morning. Patient called to ask with him missing taking medicine, will that affect him in any kind of way?    Please advise.

## 2019-11-06 NOTE — TELEPHONE ENCOUNTER
Spoke to patient. Patient informed that he would need to speak with transplant regarding medication. Verbalized understanding.

## 2019-11-06 NOTE — TELEPHONE ENCOUNTER
----- Message from Jackson Serrano sent at 11/6/2019  2:01 PM CST -----  Type:  Needs Medical Advice    Who Called: Pt    Would the patient rather a call back or a response via [facility]? Callback    Best Call Back Number: [phone]    Additional Information: Pt missed both doses of Prograf medication yesterday and missed    A dose this morning. Need to know how will this affect him?

## 2019-11-08 RX ORDER — CARVEDILOL 25 MG/1
25 TABLET ORAL 2 TIMES DAILY
Qty: 60 TABLET | Refills: 4 | Status: SHIPPED | OUTPATIENT
Start: 2019-11-08 | End: 2020-04-06 | Stop reason: SDUPTHER

## 2019-11-11 ENCOUNTER — OFFICE VISIT (OUTPATIENT)
Dept: TRANSPLANT | Facility: CLINIC | Age: 64
End: 2019-11-11
Payer: COMMERCIAL

## 2019-11-11 VITALS
OXYGEN SATURATION: 99 % | WEIGHT: 207.44 LBS | SYSTOLIC BLOOD PRESSURE: 146 MMHG | RESPIRATION RATE: 16 BRPM | HEIGHT: 73 IN | TEMPERATURE: 98 F | BODY MASS INDEX: 27.49 KG/M2 | DIASTOLIC BLOOD PRESSURE: 83 MMHG | HEART RATE: 69 BPM

## 2019-11-11 DIAGNOSIS — Z79.60 LONG-TERM USE OF IMMUNOSUPPRESSANT MEDICATION: ICD-10-CM

## 2019-11-11 DIAGNOSIS — Z29.89 PROPHYLACTIC IMMUNOTHERAPY: ICD-10-CM

## 2019-11-11 DIAGNOSIS — E11.22 TYPE 2 DIABETES MELLITUS WITH STAGE 2 CHRONIC KIDNEY DISEASE, WITH LONG-TERM CURRENT USE OF INSULIN: ICD-10-CM

## 2019-11-11 DIAGNOSIS — Z94.0 IMMUNOSUPPRESSIVE MANAGEMENT ENCOUNTER FOLLOWING KIDNEY TRANSPLANT: ICD-10-CM

## 2019-11-11 DIAGNOSIS — Z92.89 HISTORY OF CARDIOVERSION: ICD-10-CM

## 2019-11-11 DIAGNOSIS — Z86.19 HISTORY OF HEPATITIS C: ICD-10-CM

## 2019-11-11 DIAGNOSIS — N18.2 TYPE 2 DIABETES MELLITUS WITH STAGE 2 CHRONIC KIDNEY DISEASE, WITH LONG-TERM CURRENT USE OF INSULIN: ICD-10-CM

## 2019-11-11 DIAGNOSIS — Z94.0 S/P KIDNEY TRANSPLANT: Primary | ICD-10-CM

## 2019-11-11 DIAGNOSIS — E78.5 HYPERLIPIDEMIA, UNSPECIFIED HYPERLIPIDEMIA TYPE: ICD-10-CM

## 2019-11-11 DIAGNOSIS — Z94.0 KIDNEY REPLACED BY TRANSPLANT: ICD-10-CM

## 2019-11-11 DIAGNOSIS — Z79.899 IMMUNOSUPPRESSIVE MANAGEMENT ENCOUNTER FOLLOWING KIDNEY TRANSPLANT: ICD-10-CM

## 2019-11-11 DIAGNOSIS — Z79.4 TYPE 2 DIABETES MELLITUS WITH STAGE 2 CHRONIC KIDNEY DISEASE, WITH LONG-TERM CURRENT USE OF INSULIN: ICD-10-CM

## 2019-11-11 DIAGNOSIS — Z79.01 ANTICOAGULANT LONG-TERM USE: ICD-10-CM

## 2019-11-11 DIAGNOSIS — I10 ESSENTIAL HYPERTENSION: ICD-10-CM

## 2019-11-11 DIAGNOSIS — I48.19 PERSISTENT ATRIAL FIBRILLATION: ICD-10-CM

## 2019-11-11 DIAGNOSIS — D63.8 ANEMIA OF CHRONIC DISEASE: ICD-10-CM

## 2019-11-11 DIAGNOSIS — N18.2 CKD (CHRONIC KIDNEY DISEASE) STAGE 2, GFR 60-89 ML/MIN: Chronic | ICD-10-CM

## 2019-11-11 PROCEDURE — 99215 OFFICE O/P EST HI 40 MIN: CPT | Mod: S$GLB,,, | Performed by: INTERNAL MEDICINE

## 2019-11-11 PROCEDURE — 3077F PR MOST RECENT SYSTOLIC BLOOD PRESSURE >= 140 MM HG: ICD-10-PCS | Mod: CPTII,S$GLB,, | Performed by: INTERNAL MEDICINE

## 2019-11-11 PROCEDURE — 3079F DIAST BP 80-89 MM HG: CPT | Mod: CPTII,S$GLB,, | Performed by: INTERNAL MEDICINE

## 2019-11-11 PROCEDURE — 3008F PR BODY MASS INDEX (BMI) DOCUMENTED: ICD-10-PCS | Mod: CPTII,S$GLB,, | Performed by: INTERNAL MEDICINE

## 2019-11-11 PROCEDURE — 3077F SYST BP >= 140 MM HG: CPT | Mod: CPTII,S$GLB,, | Performed by: INTERNAL MEDICINE

## 2019-11-11 PROCEDURE — 99215 PR OFFICE/OUTPT VISIT, EST, LEVL V, 40-54 MIN: ICD-10-PCS | Mod: S$GLB,,, | Performed by: INTERNAL MEDICINE

## 2019-11-11 PROCEDURE — 3008F BODY MASS INDEX DOCD: CPT | Mod: CPTII,S$GLB,, | Performed by: INTERNAL MEDICINE

## 2019-11-11 PROCEDURE — 3079F PR MOST RECENT DIASTOLIC BLOOD PRESSURE 80-89 MM HG: ICD-10-PCS | Mod: CPTII,S$GLB,, | Performed by: INTERNAL MEDICINE

## 2019-11-11 PROCEDURE — 99999 PR PBB SHADOW E&M-EST. PATIENT-LVL IV: ICD-10-PCS | Mod: PBBFAC,,, | Performed by: INTERNAL MEDICINE

## 2019-11-11 PROCEDURE — 99999 PR PBB SHADOW E&M-EST. PATIENT-LVL IV: CPT | Mod: PBBFAC,,, | Performed by: INTERNAL MEDICINE

## 2019-11-11 RX ORDER — TACROLIMUS 0.5 MG/1
CAPSULE ORAL
Qty: 120 CAPSULE | Refills: 11 | Status: SHIPPED | OUTPATIENT
Start: 2019-11-11 | End: 2019-11-11 | Stop reason: SDUPTHER

## 2019-11-11 RX ORDER — MYCOPHENOLATE MOFETIL 250 MG/1
1000 CAPSULE ORAL 2 TIMES DAILY
Qty: 240 CAPSULE | Refills: 11 | Status: SHIPPED | OUTPATIENT
Start: 2019-11-11 | End: 2019-11-11 | Stop reason: SDUPTHER

## 2019-11-11 RX ORDER — MYCOPHENOLATE MOFETIL 250 MG/1
1000 CAPSULE ORAL 2 TIMES DAILY
Qty: 240 CAPSULE | Refills: 11 | Status: SHIPPED | OUTPATIENT
Start: 2019-11-11 | End: 2020-11-16 | Stop reason: SDUPTHER

## 2019-11-11 RX ORDER — TACROLIMUS 0.5 MG/1
CAPSULE ORAL
Qty: 120 CAPSULE | Refills: 11 | Status: SHIPPED | OUTPATIENT
Start: 2019-11-11 | End: 2019-11-20 | Stop reason: DRUGHIGH

## 2019-11-11 RX ORDER — PREDNISONE 5 MG/1
5 TABLET ORAL DAILY
Qty: 30 TABLET | Refills: 11 | Status: SHIPPED | OUTPATIENT
Start: 2019-11-11 | End: 2020-11-16 | Stop reason: SDUPTHER

## 2019-11-11 RX ORDER — PREDNISONE 5 MG/1
5 TABLET ORAL DAILY
Qty: 30 TABLET | Refills: 11 | Status: SHIPPED | OUTPATIENT
Start: 2019-11-11 | End: 2019-11-11 | Stop reason: SDUPTHER

## 2019-11-11 NOTE — TELEPHONE ENCOUNTER
----- Message from Erick Carter sent at 11/11/2019  3:06 PM CST -----  Contact: Pt  Pt gets mycophenolate (CELLCEPT) 250 mg Cap and tacrolimus (PROGRAF) 0.5 MG Cap       [facility] [place] - [place], [place] - [address] [facility]  [id number] [facility] [address]  Phone: [phone] Fax: [phone]      Pt# [phone]

## 2019-11-11 NOTE — LETTER
November 11, 2019        Olayinka Ewing  [address]  [facility] [address]  Phone: [phone]  Fax: [phone]             [facility]  [address]  [facility] [address]  Phone: [phone]   Patient: Ravi Zamorano   MR Number: [id number]   YOB: 1955   Date of Visit: 11/11/2019       Dear Dr. Olayinka Ewing    Thank you for referring Ravi Zamorano to me for evaluation. Attached you will find relevant portions of my assessment and plan of care.    If you have questions, please do not hesitate to call me. I look forward to following Ravi Zamorano along with you.    Sincerely,    Sylvie Frias MD    Enclosure    If you would like to receive this communication electronically, please contact [email or web address] or [phone] to request [employer] access.    [employer] is a tool which provides read-only access to select patient information with whom you have a relationship. Its easy to use and provides real time access to review your patients record including encounter summaries, notes, results, and demographic information.    If you feel you have received this communication in error or would no longer like to receive these types of communications, please e-mail [email or web address]

## 2019-11-11 NOTE — PATIENT INSTRUCTIONS
1. Stay active   2. See a dermatologist every year   3. Make sure to see a general nephrologist every 3 months   4. Make sure to control your diabetes and blood pressure as best as possible so they don't hurt your transplanted kidney   5. Work with Dr. Ewing to optimize the amount of protein you spill in your urine

## 2019-11-11 NOTE — PROGRESS NOTES
Kidney Post-Transplant Assessment    Referring Physician: Alexi Glasgow  Current Nephrologist: Olayinka Ewing    ORGAN: RIGHT KIDNEY  Donor Type: donation after brain death  PHS Increased Risk: yes  Cold Ischemia: 314 mins  Induction Medications: thymoglobulin    Subjective:     CC:  Reassessment of renal allograft function and management of chronic immunosuppression.    Kidney History:  Mr. Zamorano is a 64 y.o. year old Black or  male with history of ESRD presumed secondary to DM/HTN who was on RRT since 5/6/15 until receiving PHS increased risk DDRT (THYMO induction given HCV positive donor and recipient, KDPI 85%, WIT 27 minutes, CIT 5 hours and 14 minutes, donor RPR positive thus recipient received PCN x3 doses; CMV D+/R+) on 11/5/16. He started Harvoni on 1/12/17 and has SVR; HCV PCR negative since 2/8/17. He has CKD stage 2 - GFR 60-89 and his baseline creatinine is between 1.0 to 1.4. He takes mycophenolate mofetil, prednisone and tacrolimus for maintenance immunosuppression.     Interval History: Patient last seen in transplant clinic on 11/12/18. Since last visit he was admitted from 3/15/19 to 3/16/19 with hypertensive urgency and had A-fib with DCCV on 8/26/19. States home BPs are in the 120-150s/70-80s. Gained 7 lbs since last transplant clinic visit.     Review of Systems  Constitutional: Negative for fever, appetite change and fatigue.    HENT: +intermittent vertigo but states it is much less frequent - last episode last year; Negative for hearing loss, sore throat and mouth sores.   Eyes: +bilateral eye floaters; Negative for photophobia, pain and visual disturbance.   Respiratory: Negative for cough, chest tightness, shortness of breath and wheezing.   Cardiovascular: Negative for chest pain, palpitations and leg swelling.   Gastrointestinal: Negative for nausea, vomiting, abdominal pain, diarrhea, constipation, blood in stool and abdominal distention.   Genitourinary:  +foamy urine; Negative for dysuria, urgency, frequency, hematuria, decreased urine volume, difficulty urinating  Musculoskeletal: Negative for back pain, joint swelling, arthralgias and gait problem.   Skin: Negative for pallor, rash and wound.   Neurological: +mild tremors; Negative for syncope, weakness, light-headedness and headaches.   Hematological: Negative for adenopathy. Does not bruise/bleed easily.   Psychiatric/Behavioral: Negative for confusion, sleep disturbance, and dysphoric mood. The patient is not nervous/anxious.      Medications:  Current Outpatient Medications   Medication Sig Dispense Refill    apixaban (ELIQUIS) 5 mg Tab Take 1 tablet (5 mg total) by mouth 2 (two) times daily. 60 tablet 11    aspirin 81 MG Chew Take 81 mg by mouth once daily.      atorvastatin (LIPITOR) 40 MG tablet Take 1 tablet (40 mg total) by mouth once daily. 90 tablet 3    blood sugar diagnostic Strp 1 strip by Misc.(Non-Drug; Combo Route) route 4 (four) times daily before meals and nightly. e11.65 100 strip 5    blood sugar diagnostic Strp test blood sugar four times daily 200 each 6    carvedilol (COREG) 25 MG tablet Take 1 tablet (25 mg total) by mouth 2 (two) times daily. 60 tablet 4    famotidine (PEPCID) 20 MG tablet Take 1 tablet (20 mg total) by mouth every evening. 90 tablet 3    flecainide (TAMBOCOR) 100 MG Tab Take 1 tablet (100 mg total) by mouth every 12 (twelve) hours. 60 tablet 3    gabapentin (NEURONTIN) 300 MG capsule Take 1 tablet in the morning; 1 tablet midday and 3 tablets at night. 450 capsule 3    insulin detemir U-100 (LEVEMIR FLEXTOUCH) 100 unit/mL (3 mL) SubQ InPn pen Inject 35 Units into the skin every evening. 15 mL 4    insulin lispro (HUMALOG KWIKPEN INSULIN) 100 unit/mL pen Inject 18 units w/ breakfast, 16 units w/ lunch and dinner plus scale 150-200 +2, 201-250 +4, 251-300 +6, 301-350 +8. 30 mL 4    lancets Misc 1 each by Misc.(Non-Drug; Combo Route) route 4 (four) times daily  "before meals and nightly. e11.65 100 each 0    meclizine (ANTIVERT) 25 mg tablet Take 1 tablet (25 mg total) by mouth 3 (three) times daily as needed for Dizziness. 21 tablet 0    melatonin 5 mg Tab Take 1 tablet (5 mg total) by mouth every evening.      mycophenolate (CELLCEPT) 250 mg Cap Take 4 capsules (1,000 mg total) by mouth 2 (two) times daily. 240 capsule 11    NIFEdipine (PROCARDIA-XL) 30 MG (OSM) 24 hr tablet Take 1 tablet (30 mg total) by mouth once daily. 30 tablet 11    pen needle, diabetic 32 gauge x 5/32" Ndle USE TO ADMINISTER INSULIN 4 TIMES DAILY. 400 each 3    pen needle, diabetic 32 gauge x 5/32" Ndle use daily as directed 100 each 1    predniSONE (DELTASONE) 5 MG tablet Take 1 tablet (5 mg total) by mouth once daily. 30 tablet 11    tacrolimus (PROGRAF) 0.5 MG Cap Take 1mg (2 capsules)  orally in the am and 1 mg (2 capsule) orally in the pm 120 capsule 11    valsartan (DIOVAN) 160 MG tablet Take 1 tablet (160 mg total) by mouth once daily. 90 tablet 3    blood-glucose meter (FREESTYLE SYSTEM KIT) kit Use as instructed e11.65 may substitute meter as covered by insurance 1 each 0     No current facility-administered medications for this visit.      *per patient he started valsartan 160 mg daily ~1-2 months ago     Objective:   Blood pressure (!) 146/83, pulse 69, temperature 98 °F (36.7 °C), temperature source Oral, resp. rate 16, height 6' 1" (1.854 m), weight 94.1 kg (207 lb 7.3 oz), SpO2 99 %.body mass index is 27.37 kg/m².    Physical Exam  General: No acute distress, well groomed, alert and oriented x 3  HEENT: Normocephalic, atraumatic, EOM's intact bilaterally, external inspection of ears and nose normal, moist mucous membranes, no oral ulcerations/lesions  Neck: Supple, symmetrical, trachea midline, no thyromegaly, no JVD  Respiratory: Clear to auscultation bilaterally, respirations unlabored, no rales/rhonchi/wheezing  Cardiovacular: Regular rate and rhythm, S1, S2 normal, no " murmurs, rubs or gallops  Gastrointestinal: Soft, non-tender, bowel sounds normal, no hepatosplenomegaly  Renal allograft exam: No tenderness, no bruits, normal exam  Musculoskeletal: No knee or ankle joint tenderness or swelling.   Extremities: No clubbing or cyanosis of bilateral upper extremities; no lower extremity edema bilaterally, radial pulses 2+ bilaterally, symmetric  Skin: warm and dry; no rash on exposed skin  Neurologic: CN grossly intact      Labs:  Lab Results   Component Value Date    WBC 4.51 10/23/2019    HGB 13.4 (L) 10/23/2019    HCT 44.4 10/23/2019     10/23/2019     10/23/2019    K 4.1 10/23/2019    K 4.1 10/23/2019     10/23/2019     10/23/2019    CO2 29 10/23/2019    CO2 29 10/23/2019    BUN 12 10/23/2019    BUN 12 10/23/2019    CREATININE 1.1 10/23/2019    CREATININE 1.1 10/23/2019    EGFRNONAA >60.0 10/23/2019    EGFRNONAA >60.0 10/23/2019    CALCIUM 9.5 10/23/2019    CALCIUM 9.5 10/23/2019    PHOS 3.5 10/23/2019    MG 1.3 (L) 10/23/2019    ALBUMIN 3.8 10/23/2019    ALBUMIN 3.8 10/23/2019    ALBUMIN 3.8 10/23/2019    AST 13 10/23/2019    AST 13 10/23/2019    ALT <5 (L) 10/23/2019    ALT <5 (L) 10/23/2019       No results found for: EXTANC, EXTWBC, EXTSEGS, EXTPLATELETS, EXTHEMOGLOBI, EXTHEMATOCRI, EXTCREATININ, EXTSODIUM, EXTPOTASSIUM, EXTBUN, EXTCO2, EXTCALCIUM, EXTPHOSPHORU, EXTGLUCOSE, EXTALBUMIN, EXTAST, EXTALT, EXTBILITOTAL, EXTLIPASE, EXTAMYLASE    No results found for: EXTCYCLOSLVL, EXTSIROLIMUS, EXTTACROLVL, EXTPROTCRE, EXTPTHINTACT, EXTPROTEINUA, EXTWBCUA, EXTRBCUA    Labs were reviewed with the patient.    Assessment/Plan:     1. S/P cadaveric kidney transplant 11/5/2016. ESRD secondary to HTN/DMII    2. History of hepatitis C, s/p successful Rx w/ SVR24 - 9/2017    3. CKD (chronic kidney disease) stage 2, GFR 60-89 ml/min    4. Hyperlipidemia, unspecified hyperlipidemia type    5. Essential hypertension    6. Long-term use of immunosuppressant medication     7. Prophylactic immunotherapy    8. Type 2 diabetes mellitus with stage 2 chronic kidney disease, with long-term current use of insulin    9. Immunosuppressive management encounter following kidney transplant    10. Persistent atrial fibrillation    11. History of cardioversion    12. Anticoagulant long-term use    13. Anemia of chronic disease        Mr. Zamorano is a 64 y.o. male with:     # History of ESRD presumed secondary to DM/HTN who was on RRT since 5/6/15 until receiving PHS increased risk DDRT (THYMO induction given HCV positive donor and recipient, KDPI 85%, WIT 27 minutes, CIT 5 hours and 14 minutes, donor RPR positive thus recipient received PCN x3 doses; CMV D+/R+) on 11/5/16: baseline Cr 1.0 to 1.4  - last Cr within baseline at 1.1 from 10/23/19  - last UPC 0.57 from 10/30/19  - counseled him on recommendation to see his general nephrologist [id number]  - encouraged him to work with his general nephrologist to optimize proteinuria management     # Immunosuppression:   - continue Prograf 1 mg BID, last FK-506 level 7.2 from 10/23/19; goal trough 4-7  - continue MMF 1000 mg BID  - continue prednisone 5 mg daily  - continue to monitor for toxicities from immunosuppressive medications     # Infectious Surveillance:   - last BK serum PCR negative from 10/23/19  - last CMV PCR negative from 3/8/17    # HTN: BPs stable at home per patient  - continue current anti-hypertensive regimen  - defer adjustments to BP meds to his general nephrologist  - continue with home blood pressure monitoring  - low salt and healthy life discussed with the patient     # Metabolic Bone Disease/Secondary Hyperparathyroidism: last calcium/phos normal from 10/23/19  - will monitor PTH, calcium, and phosphorus as per our center protocol     # Hypomagnesemia: Mg level 1.3 from 10/23/19  - advised patient to discuss with his cardiologist regarding need for Mg supplementation; this Mg level is ok from renal transplant perspective  "     # DM: last HbA1c 7.2% from 10/23/19  - counseled patient on importance of optimizing glycemic control   - continue following up with endocrinology     # Anemia of chronic disease: Hb stable at 13.4  - will continue monitoring as per our center guidelines. No indication for acute intervention today     # Hepatitis C: genotype 1a; started Harvoni on 1/12/17; HCV PCR negative since 2/8/17; last HCV PCR negative from 9/29/17     # Healthcare Maintenance:   - advised patient to see a dermatologist annually given increased risk of skin cancers with immunosuppressive medications - placed referral to dermatology here at [facility] per patient request at time of Nov 2018 visit but states still "pending review"    Follow-up:   Annual follow-up with kidney transplant clinic with repeat labs, including renal function panel with UA, urine protein/creatinine ratio, and drug trough level q3 months.  Patient also reminded to follow-up with general nephrologist.    Sylvie Frias MD       Education:   Material provided to the patient.  Patient reminded to call with any health changes since these can be early signs of significant complications.  Also, I advised the patient to be sure any new medications or changes of old medications are discussed with either a pharmacist or physician knowledgeable with transplant to avoid rejection/drug toxicity related to significant drug interactions.    UNOS Patient Status  Functional Status: 100% - Normal, no complaints, no evidence of disease  Physical Capacity: No Limitations    "

## 2019-11-11 NOTE — PROGRESS NOTES
Post Clinic Note:    SW met with patient in post clinic to follow up regarding any needs post transplant and assess coping. Pt is a kidney transplant recipient from 11/5/2016.  Patient reports that he only has [employer] private insurance and hopes to never have to sign up for Medicare ever. Patient expressed no psychosocial needs or concerns at this time, reports no issues with obtaining medications, and reports receiving medications from [facility]. Patient reports knowing how to contact SW team if any additional needs arise and SW remains available at [phone].

## 2019-11-19 ENCOUNTER — LAB VISIT (OUTPATIENT)
Dept: LAB | Facility: HOSPITAL | Age: 64
End: 2019-11-19
Attending: INTERNAL MEDICINE
Payer: COMMERCIAL

## 2019-11-19 ENCOUNTER — PATIENT OUTREACH (OUTPATIENT)
Dept: ADMINISTRATIVE | Facility: OTHER | Age: 64
End: 2019-11-19

## 2019-11-19 DIAGNOSIS — Z94.0 KIDNEY REPLACED BY TRANSPLANT: ICD-10-CM

## 2019-11-19 LAB
ALBUMIN SERPL BCP-MCNC: 3.6 G/DL (ref 3.5–5.2)
ANION GAP SERPL CALC-SCNC: 10 MMOL/L (ref 8–16)
BASOPHILS # BLD AUTO: 0.05 K/UL (ref 0–0.2)
BASOPHILS NFR BLD: 0.9 % (ref 0–1.9)
BUN SERPL-MCNC: 13 MG/DL (ref 8–23)
CALCIUM SERPL-MCNC: 9.3 MG/DL (ref 8.7–10.5)
CHLORIDE SERPL-SCNC: 104 MMOL/L (ref 95–110)
CO2 SERPL-SCNC: 27 MMOL/L (ref 23–29)
CREAT SERPL-MCNC: 1.1 MG/DL (ref 0.5–1.4)
DIFFERENTIAL METHOD: ABNORMAL
EOSINOPHIL # BLD AUTO: 0.1 K/UL (ref 0–0.5)
EOSINOPHIL NFR BLD: 1.5 % (ref 0–8)
ERYTHROCYTE [DISTWIDTH] IN BLOOD BY AUTOMATED COUNT: 15.9 % (ref 11.5–14.5)
EST. GFR  (AFRICAN AMERICAN): >60 ML/MIN/1.73 M^2
EST. GFR  (NON AFRICAN AMERICAN): >60 ML/MIN/1.73 M^2
GLUCOSE SERPL-MCNC: 128 MG/DL (ref 70–110)
HCT VFR BLD AUTO: 42.3 % (ref 40–54)
HGB BLD-MCNC: 12.8 G/DL (ref 14–18)
IMM GRANULOCYTES # BLD AUTO: 0.01 K/UL (ref 0–0.04)
IMM GRANULOCYTES NFR BLD AUTO: 0.2 % (ref 0–0.5)
LYMPHOCYTES # BLD AUTO: 1.2 K/UL (ref 1–4.8)
LYMPHOCYTES NFR BLD: 22.1 % (ref 18–48)
MAGNESIUM SERPL-MCNC: 1.3 MG/DL (ref 1.6–2.6)
MCH RBC QN AUTO: 24.2 PG (ref 27–31)
MCHC RBC AUTO-ENTMCNC: 30.3 G/DL (ref 32–36)
MCV RBC AUTO: 80 FL (ref 82–98)
MONOCYTES # BLD AUTO: 1 K/UL (ref 0.3–1)
MONOCYTES NFR BLD: 19.1 % (ref 4–15)
NEUTROPHILS # BLD AUTO: 3 K/UL (ref 1.8–7.7)
NEUTROPHILS NFR BLD: 56.2 % (ref 38–73)
NRBC BLD-RTO: 0 /100 WBC
PHOSPHATE SERPL-MCNC: 2.8 MG/DL (ref 2.7–4.5)
PLATELET # BLD AUTO: 152 K/UL (ref 150–350)
PMV BLD AUTO: ABNORMAL FL (ref 9.2–12.9)
POTASSIUM SERPL-SCNC: 3.9 MMOL/L (ref 3.5–5.1)
RBC # BLD AUTO: 5.3 M/UL (ref 4.6–6.2)
SODIUM SERPL-SCNC: 141 MMOL/L (ref 136–145)
TACROLIMUS BLD-MCNC: 7.4 NG/ML (ref 5–15)
WBC # BLD AUTO: 5.29 K/UL (ref 3.9–12.7)

## 2019-11-19 PROCEDURE — 36415 COLL VENOUS BLD VENIPUNCTURE: CPT

## 2019-11-19 PROCEDURE — 80197 ASSAY OF TACROLIMUS: CPT

## 2019-11-19 PROCEDURE — 80069 RENAL FUNCTION PANEL: CPT

## 2019-11-19 PROCEDURE — 83735 ASSAY OF MAGNESIUM: CPT

## 2019-11-19 PROCEDURE — 85025 COMPLETE CBC W/AUTO DIFF WBC: CPT

## 2019-11-20 DIAGNOSIS — Z94.0 KIDNEY REPLACED BY TRANSPLANT: ICD-10-CM

## 2019-11-20 RX ORDER — TACROLIMUS 0.5 MG/1
CAPSULE ORAL
Qty: 90 CAPSULE | Refills: 11 | Status: SHIPPED | OUTPATIENT
Start: 2019-11-20 | End: 2020-11-16 | Stop reason: SDUPTHER

## 2019-11-20 NOTE — PROGRESS NOTES
Prograf level higher than goal --> if true 12 hour trough decrease Prograf from 1 mg BID to 1/0.5 and repeat labs in 7-10 days

## 2019-11-20 NOTE — TELEPHONE ENCOUNTER
----- Message from Sylvie Frias MD sent at 11/20/2019  3:20 PM CST -----  Prograf level higher than goal --> if true 12 hour trough decrease Prograf from 1 mg BID to 1/0.5 and repeat labs in 7-10 days

## 2019-11-20 NOTE — TELEPHONE ENCOUNTER
Patient made aware to change his tacro to 1mg in the am and only 0.5mg in the evening. Patient verbalized understanding and has repeat labs scheduled.

## 2019-11-23 ENCOUNTER — OFFICE VISIT (OUTPATIENT)
Dept: OPTOMETRY | Facility: CLINIC | Age: 64
End: 2019-11-23
Payer: COMMERCIAL

## 2019-11-23 DIAGNOSIS — H52.4 HYPEROPIA WITH REGULAR ASTIGMATISM AND PRESBYOPIA, BILATERAL: ICD-10-CM

## 2019-11-23 DIAGNOSIS — Z01.00 EYE EXAM, ROUTINE: Primary | ICD-10-CM

## 2019-11-23 DIAGNOSIS — H52.223 HYPEROPIA WITH REGULAR ASTIGMATISM AND PRESBYOPIA, BILATERAL: ICD-10-CM

## 2019-11-23 DIAGNOSIS — H40.013 OAG (OPEN ANGLE GLAUCOMA) SUSPECT, LOW RISK, BILATERAL: ICD-10-CM

## 2019-11-23 DIAGNOSIS — H52.03 HYPEROPIA WITH REGULAR ASTIGMATISM AND PRESBYOPIA, BILATERAL: ICD-10-CM

## 2019-11-23 PROCEDURE — 92015 DETERMINE REFRACTIVE STATE: CPT | Mod: S$GLB,,, | Performed by: OPTOMETRIST

## 2019-11-23 PROCEDURE — 92014 PR EYE EXAM, EST PATIENT,COMPREHESV: ICD-10-PCS | Mod: S$GLB,,, | Performed by: OPTOMETRIST

## 2019-11-23 PROCEDURE — 99999 PR PBB SHADOW E&M-EST. PATIENT-LVL II: CPT | Mod: PBBFAC,,, | Performed by: OPTOMETRIST

## 2019-11-23 PROCEDURE — 99999 PR PBB SHADOW E&M-EST. PATIENT-LVL II: ICD-10-PCS | Mod: PBBFAC,,, | Performed by: OPTOMETRIST

## 2019-11-23 PROCEDURE — 92015 PR REFRACTION: ICD-10-PCS | Mod: S$GLB,,, | Performed by: OPTOMETRIST

## 2019-11-23 PROCEDURE — 92014 COMPRE OPH EXAM EST PT 1/>: CPT | Mod: S$GLB,,, | Performed by: OPTOMETRIST

## 2019-11-23 NOTE — LETTER
November 23, 2019      Mima Mack MD  [address]  [facility] [address]           [facility]  [address]  [facility] [address]  Phone: [phone]  Fax: [phone]          Patient: Ravi Zamorano   MR Number: [id number]   YOB: 1955   Date of Visit: 11/23/2019       Dear Dr. Mima Mack:    Thank you for referring Ravi Zamorano to me for evaluation. Attached you will find relevant portions of my assessment and plan of care.    If you have questions, please do not hesitate to call me. I look forward to following Ravi Zamorano along with you.    Sincerely,    Gabriel Radford, OD    Enclosure  CC:  No Recipients    If you would like to receive this communication electronically, please contact [email or web address] or [phone] to request more information on [facility] Link access.    For providers and/or their staff who would like to refer a patient to [facility], please contact us through our one-stop-shop provider referral line, [facility], at [phone].    If you feel you have received this communication in error or would no longer like to receive these types of communications, please e-mail [email or web address]

## 2019-11-23 NOTE — PROGRESS NOTES
HPI     Patient is here for annual diabetic eye exam. Spectera?  Floater OU long standing in left.  Denies Flashes  Cataracts OU  Blur at distance and near    Hemoglobin A1C       Date                     Value               Ref Range             Status                10/23/2019               7.5 (H)             4.0 - 5.6 %           Final                  08/01/2019               8.6 (H)             4.0 - 5.6 %           Final                  08/21/2018               6.7 (H)             4.0 - 5.6 %           Final                  Last edited by Gabriel Radford, OD on 11/23/2019  9:33 AM. (History)            Assessment /Plan     For exam results, see Encounter Report.    Eye exam, routine  -Spectera Vision    Hyperopia with regular astigmatism and presbyopia, bilateral  Eyeglass Final Rx     Eyeglass Final Rx       Sphere Cylinder Axis Dist VA Add    Right +1.50 +0.50 005 20/20 +2.50    Left +1.25 +0.75 180 20/20 +2.50    Type:  PAL    Expiration Date:  11/23/2020                OAG (open angle glaucoma) suspect, low risk, bilateral  -     Reynoso Visual Field - OU - Extended - Both Eyes  -     OCT, Optic Nerve - OU - Both Eyes; Future  -Enlarged CD's, no known Fhx  -HVF, OCT w/ PHREV (not available phone appointment)    RTC 1 yr

## 2019-11-26 RX ORDER — INSULIN LISPRO 100 [IU]/ML
INJECTION, SOLUTION INTRAVENOUS; SUBCUTANEOUS
Qty: 30 ML | Refills: 4 | Status: SHIPPED | OUTPATIENT
Start: 2019-11-26 | End: 2020-08-19 | Stop reason: SDUPTHER

## 2019-11-27 RX ORDER — FLECAINIDE ACETATE 100 MG/1
100 TABLET ORAL EVERY 12 HOURS
Qty: 60 TABLET | Refills: 3 | Status: CANCELLED | OUTPATIENT
Start: 2019-11-27

## 2019-11-29 RX ORDER — FLECAINIDE ACETATE 100 MG/1
100 TABLET ORAL EVERY 12 HOURS
Qty: 60 TABLET | Refills: 3 | Status: SHIPPED | OUTPATIENT
Start: 2019-11-29 | End: 2020-04-15

## 2019-12-03 ENCOUNTER — LAB VISIT (OUTPATIENT)
Dept: LAB | Facility: HOSPITAL | Age: 64
End: 2019-12-03
Attending: INTERNAL MEDICINE
Payer: COMMERCIAL

## 2019-12-03 DIAGNOSIS — Z94.0 KIDNEY REPLACED BY TRANSPLANT: ICD-10-CM

## 2019-12-03 LAB
ALBUMIN SERPL BCP-MCNC: 3.6 G/DL (ref 3.5–5.2)
ANION GAP SERPL CALC-SCNC: 11 MMOL/L (ref 8–16)
BUN SERPL-MCNC: 15 MG/DL (ref 8–23)
CALCIUM SERPL-MCNC: 9.8 MG/DL (ref 8.7–10.5)
CHLORIDE SERPL-SCNC: 103 MMOL/L (ref 95–110)
CO2 SERPL-SCNC: 28 MMOL/L (ref 23–29)
CREAT SERPL-MCNC: 1.1 MG/DL (ref 0.5–1.4)
EST. GFR  (AFRICAN AMERICAN): >60 ML/MIN/1.73 M^2
EST. GFR  (NON AFRICAN AMERICAN): >60 ML/MIN/1.73 M^2
GLUCOSE SERPL-MCNC: 223 MG/DL (ref 70–110)
PHOSPHATE SERPL-MCNC: 2.7 MG/DL (ref 2.7–4.5)
POTASSIUM SERPL-SCNC: 4.3 MMOL/L (ref 3.5–5.1)
SODIUM SERPL-SCNC: 142 MMOL/L (ref 136–145)
TACROLIMUS BLD-MCNC: 6.7 NG/ML (ref 5–15)

## 2019-12-03 PROCEDURE — 80197 ASSAY OF TACROLIMUS: CPT

## 2019-12-03 PROCEDURE — 36415 COLL VENOUS BLD VENIPUNCTURE: CPT

## 2019-12-03 PROCEDURE — 80069 RENAL FUNCTION PANEL: CPT

## 2019-12-13 DIAGNOSIS — E11.42 TYPE 2 DIABETES MELLITUS WITH POLYNEUROPATHY: ICD-10-CM

## 2020-01-13 ENCOUNTER — PATIENT OUTREACH (OUTPATIENT)
Dept: ADMINISTRATIVE | Facility: HOSPITAL | Age: 65
End: 2020-01-13

## 2020-01-20 ENCOUNTER — LAB VISIT (OUTPATIENT)
Dept: LAB | Facility: HOSPITAL | Age: 65
End: 2020-01-20
Attending: INTERNAL MEDICINE
Payer: COMMERCIAL

## 2020-01-20 ENCOUNTER — TELEPHONE (OUTPATIENT)
Dept: INTERNAL MEDICINE | Facility: CLINIC | Age: 65
End: 2020-01-20

## 2020-01-20 DIAGNOSIS — N18.2 TYPE 2 DIABETES MELLITUS WITH STAGE 2 CHRONIC KIDNEY DISEASE, WITH LONG-TERM CURRENT USE OF INSULIN: ICD-10-CM

## 2020-01-20 DIAGNOSIS — E11.22 TYPE 2 DIABETES MELLITUS WITH STAGE 2 CHRONIC KIDNEY DISEASE, WITH LONG-TERM CURRENT USE OF INSULIN: ICD-10-CM

## 2020-01-20 DIAGNOSIS — Z79.4 TYPE 2 DIABETES MELLITUS WITH STAGE 2 CHRONIC KIDNEY DISEASE, WITH LONG-TERM CURRENT USE OF INSULIN: ICD-10-CM

## 2020-01-20 LAB
ALBUMIN SERPL BCP-MCNC: 3.5 G/DL (ref 3.5–5.2)
ALP SERPL-CCNC: 81 U/L (ref 55–135)
ALT SERPL W/O P-5'-P-CCNC: 5 U/L (ref 10–44)
ANION GAP SERPL CALC-SCNC: 9 MMOL/L (ref 8–16)
AST SERPL-CCNC: 13 U/L (ref 10–40)
BILIRUB SERPL-MCNC: 0.3 MG/DL (ref 0.1–1)
BUN SERPL-MCNC: 9 MG/DL (ref 8–23)
CALCIUM SERPL-MCNC: 8.9 MG/DL (ref 8.7–10.5)
CHLORIDE SERPL-SCNC: 105 MMOL/L (ref 95–110)
CO2 SERPL-SCNC: 28 MMOL/L (ref 23–29)
CREAT SERPL-MCNC: 0.9 MG/DL (ref 0.5–1.4)
EST. GFR  (AFRICAN AMERICAN): >60 ML/MIN/1.73 M^2
EST. GFR  (NON AFRICAN AMERICAN): >60 ML/MIN/1.73 M^2
ESTIMATED AVG GLUCOSE: 157 MG/DL (ref 68–131)
GLUCOSE SERPL-MCNC: 152 MG/DL (ref 70–110)
HBA1C MFR BLD HPLC: 7.1 % (ref 4–5.6)
POTASSIUM SERPL-SCNC: 3.7 MMOL/L (ref 3.5–5.1)
PROT SERPL-MCNC: 7.2 G/DL (ref 6–8.4)
SODIUM SERPL-SCNC: 142 MMOL/L (ref 136–145)

## 2020-01-20 PROCEDURE — 80053 COMPREHEN METABOLIC PANEL: CPT

## 2020-01-20 PROCEDURE — 36415 COLL VENOUS BLD VENIPUNCTURE: CPT

## 2020-01-20 PROCEDURE — 83036 HEMOGLOBIN GLYCOSYLATED A1C: CPT

## 2020-03-10 NOTE — TELEPHONE ENCOUNTER
----- Message from Yamile Jenkins sent at 2/19/2018  1:43 PM CST -----  Contact: Patient  Ravi, patient [phone], Calling to schedule his colonoscopy. Please advise. Thanks.   
I ordered the colonoscopy at his last office visit. Please give him the phone number to schedule.   
Spoke with pt. Pt has been given the Endoscopy department number to schedule colonoscopy.   
Spoke with pt. Pt states that he would like to have a colonoscopy done, since he is at that age now. Pt asks if you can place a referral/order? please advise. Advised pt that I will give him a call once the order is in & have him call Endoscopy dept to schedule.  
1)left lumbar radiculopathy  2)AGATHAI VTE 2.0 SCORE [CLOT updated 2019]    AGE RELATED RISK FACTORS                                                       MOBILITY RELATED FACTORS  [x ] Age 41-60 years                                            (1 Point)                    [ ] Bed rest                                                        (1 Point)  [ ] Age: 61-74 years                                           (2 Points)                  [ ] Plaster cast                                                   (2 Points)  [ ] Age= 75 years                                              (3 Points)                    [ ] Bed bound for more than 72 hours                 (2 Points)    DISEASE RELATED RISK FACTORS                                               GENDER SPECIFIC FACTORS  [ ] Edema in the lower extremities                       (1 Point)              [ ] Pregnancy                                                     (1 Point)  [ ] Varicose veins                                               (1 Point)                     [ ] Post-partum < 6 weeks                                   (1 Point)             [x ] BMI > 25 Kg/m2                                            (1 Point)                     [ ] Hormonal therapy  or oral contraception          (1 Point)                 [ ] Sepsis (in the previous month)                        (1 Point)               [ ] History of pregnancy complications                 (1 point)  [ ] Pneumonia or serious lung disease                                               [ ] Unexplained or recurrent                     (1 Point)           (in the previous month)                               (1 Point)  [ ] Abnormal pulmonary function test                     (1 Point)                 SURGERY RELATED RISK FACTORS  [ ] Acute myocardial infarction                              (1 Point)               [ ]  Section                                             (1 Point)  [ ] Congestive heart failure (in the previous month)  (1 Point)      [ ] Minor surgery                                                  (1 Point)   [ ] Inflammatory bowel disease                             (1 Point)               [ ] Arthroscopic surgery                                        (2 Points)  [ ] Central venous access                                      (2 Points)                [x ] General surgery lasting more than 45 minutes (2 points)  [ ] Malignancy- Present or previous                   (2 Points)                [ ] Elective arthroplasty                                         (5 points)    [ ] Stroke (in the previous month)                          (5 Points)                                                                                                                                                           HEMATOLOGY RELATED FACTORS                                                 TRAUMA RELATED RISK FACTORS  [ ] Prior episodes of VTE                                     (3 Points)                [ ] Fracture of the hip, pelvis, or leg                       (5 Points)  [ ] Positive family history for VTE                         (3 Points)             [ ] Acute spinal cord injury (in the previous month)  (5 Points)  [ ] Prothrombin 29676 A                                     (3 Points)               [ ] Paralysis  (less than 1 month)                             (5 Points)  [ ] Factor V Leiden                                             (3 Points)                  [ ] Multiple Trauma within 1 month                        (5 Points)  [ ] Lupus anticoagulants                                     (3 Points)                                                           [ ] Anticardiolipin antibodies                               (3 Points)                                                       [ ] High homocysteine in the blood                      (3 Points)                                             [ ] Other congenital or acquired thrombophilia      (3 Points)                                                [ ] Heparin induced thrombocytopenia                  (3 Points)                                     Total Score [      4    ]

## 2020-04-06 RX ORDER — CARVEDILOL 25 MG/1
25 TABLET ORAL 2 TIMES DAILY
Qty: 60 TABLET | Refills: 4 | Status: CANCELLED | OUTPATIENT
Start: 2020-04-06 | End: 2021-04-06

## 2020-04-06 RX ORDER — CARVEDILOL 25 MG/1
25 TABLET ORAL 2 TIMES DAILY
Qty: 180 TABLET | Refills: 3 | Status: SHIPPED | OUTPATIENT
Start: 2020-04-06 | End: 2021-03-24 | Stop reason: SDUPTHER

## 2020-04-15 ENCOUNTER — TELEPHONE (OUTPATIENT)
Dept: INTERNAL MEDICINE | Facility: CLINIC | Age: 65
End: 2020-04-15

## 2020-04-15 DIAGNOSIS — I10 ESSENTIAL HYPERTENSION: ICD-10-CM

## 2020-04-15 RX ORDER — FLECAINIDE ACETATE 100 MG/1
100 TABLET ORAL EVERY 12 HOURS
Qty: 60 TABLET | Refills: 3 | Status: SHIPPED | OUTPATIENT
Start: 2020-04-15 | End: 2020-07-31 | Stop reason: SDUPTHER

## 2020-04-15 NOTE — TELEPHONE ENCOUNTER
----- Message from Pearl Woods sent at 4/15/2020  9:14 AM CDT -----  Contact: ashley fernandes / [facility]  pharmacy  Requesting an RX refill or new RX.  Is this a refill or new RX:  Refill  RX name and strength: flecainide (TAMBOCOR) 100 MG Tab  Directions (copy/paste from chart):  Sig - Route: Take 1 tablet (100 mg total) by mouth every 12 (twelve) hours. - Oral  Is this a 30 day or 90 day RX:  90  Local pharmacy or mail order pharmacy:  Local   Pharmacy name and phone # (copy/paste from chart):   [facility] [phone] (Phone)  [phone] (Fax)  Comments:      Requesting an RX refill or new RX.  Is this a refill or new RX:  refiill  RX name and strength: valsartan (DIOVAN) 160 MG tablet  Directions (copy/paste from chart):  Sig - Route: Take 1 tablet (160 mg total) by mouth once daily. - Oral  Is this a 30 day or 90 day RX:  90  Local pharmacy or mail order pharmacy:  Local   Pharmacy name and phone # (copy/paste from chart):   [facility] [phone] (Phone)  [phone] (Fax)  Comments:  daniel states this medication is on back order, can it be called in as something different or a different strength. Please call and advise

## 2020-04-15 NOTE — TELEPHONE ENCOUNTER
----- Message from Pearl Woods sent at 4/15/2020  9:14 AM CDT -----  Contact: ashley fernandes / [facility]  pharmacy  Requesting an RX refill or new RX.  Is this a refill or new RX:  Refill  RX name and strength: flecainide (TAMBOCOR) 100 MG Tab  Directions (copy/paste from chart):  Sig - Route: Take 1 tablet (100 mg total) by mouth every 12 (twelve) hours. - Oral  Is this a 30 day or 90 day RX:  90  Local pharmacy or mail order pharmacy:  Local   Pharmacy name and phone # (copy/paste from chart):   [facility] [phone] (Phone)  [phone] (Fax)  Comments:      Requesting an RX refill or new RX.  Is this a refill or new RX:  refiill  RX name and strength: valsartan (DIOVAN) 160 MG tablet  Directions (copy/paste from chart):  Sig - Route: Take 1 tablet (160 mg total) by mouth once daily. - Oral  Is this a 30 day or 90 day RX:  90  Local pharmacy or mail order pharmacy:  Local   Pharmacy name and phone # (copy/paste from chart):   [facility] [phone] (Phone)  [phone] (Fax)  Comments:  daniel states this medication is on back order, can it be called in as something different or a different strength. Please call and advise

## 2020-04-16 ENCOUNTER — TELEPHONE (OUTPATIENT)
Dept: INTERNAL MEDICINE | Facility: CLINIC | Age: 65
End: 2020-04-16

## 2020-04-16 DIAGNOSIS — I10 ESSENTIAL HYPERTENSION: ICD-10-CM

## 2020-04-16 RX ORDER — VALSARTAN 320 MG/1
160 TABLET ORAL DAILY
Qty: 45 TABLET | Refills: 3 | Status: SHIPPED | OUTPATIENT
Start: 2020-04-16 | End: 2020-07-11 | Stop reason: SDUPTHER

## 2020-04-16 NOTE — TELEPHONE ENCOUNTER
Will send valsartan 320mg and he should take a half tab daily. Let me know if that is also out of stock.

## 2020-04-16 NOTE — TELEPHONE ENCOUNTER
----- Message from Pearl Woods sent at 4/16/2020  8:56 AM CDT -----  Contact: self/ [phone]  Requesting an RX refill or new RX.  Is this a refill or new RX:  refiill  RX name and strength: valsartan (DIOVAN) 160 MG tablet  Directions (copy/paste from chart):  Sig - Route: Take 1 tablet (160 mg total) by mouth once daily. - Oral  Is this a 30 day or 90 day RX:  90  Local pharmacy or mail order pharmacy:  Local   Pharmacy name and phone # (copy/paste from chart):   [facility]          [phone] (Phone)  [phone] (Fax)  Comments: pt states this medication is on back order, can it be called in as something different or a different strength. Please call and advise

## 2020-05-05 ENCOUNTER — TELEPHONE (OUTPATIENT)
Dept: INTERNAL MEDICINE | Facility: CLINIC | Age: 65
End: 2020-05-05

## 2020-05-05 DIAGNOSIS — E11.42 TYPE 2 DIABETES MELLITUS WITH POLYNEUROPATHY: ICD-10-CM

## 2020-05-05 NOTE — TELEPHONE ENCOUNTER
----- Message from Naz Guillermo sent at 5/5/2020 12:23 PM CDT -----  Contact: Self   Pt states price on insulin detemir U-100 (LEVEMIR FLEXTOUCH) 100 unit/mL (3 mL) SubQ InPn pen went up and needs a cheaper rx. Please advise.

## 2020-05-06 NOTE — TELEPHONE ENCOUNTER
Telephoned patient, left message to return call----- Message from REYES Rai, KEO sent at 5/5/2020  3:56 PM CDT -----  Please let pt know that rx levemir was sent to our pharmacy [id number] (primary)  He can call pharmacy here to have med transferred.  The pharmacist will be able to transfer to [facility], toujeo , or tresiba same dose if needed for better price point.  I also will send voucher w/ rx for levemir- able to get 2 free boxes during covid19 pandemic via [employer].  Thanks  Karan

## 2020-05-07 RX ORDER — INSULIN DEGLUDEC 100 U/ML
INJECTION, SOLUTION SUBCUTANEOUS
Qty: 12 ML | Refills: 6 | Status: SHIPPED | OUTPATIENT
Start: 2020-05-07 | End: 2020-08-19 | Stop reason: SDUPTHER

## 2020-05-07 NOTE — TELEPHONE ENCOUNTER
The pt would like to know if you can change the Levemir rx to a more affordable one. The price of the Levemir increased by $35.00.        Please advise,

## 2020-05-07 NOTE — TELEPHONE ENCOUNTER
----- Message from Latisha Wisdom sent at 5/7/2020  8:09 AM CDT -----  Contact: Patient [phone]  Patient is returning a phone call.  Who left a message for the patient: Dr. Mack's office  Does patient know what this is regarding:  Insulin   Comments:

## 2020-05-14 NOTE — TELEPHONE ENCOUNTER
----- Message from Carol Alvares sent at 5/14/2020 12:22 PM CDT -----  Contact: self  Pt states per [employer] need to authorize his ONETOUCH ULTRA BLUE TEST STRIP Strp for testing x4 daily Pt ask for a call    Contact info  [phone]

## 2020-05-15 DIAGNOSIS — E11.9 TYPE 2 DIABETES MELLITUS WITHOUT COMPLICATION: ICD-10-CM

## 2020-05-19 RX ORDER — INSULIN PUMP SYRINGE, 3 ML
EACH MISCELLANEOUS
Qty: 1 EACH | Refills: 0 | Status: SHIPPED | OUTPATIENT
Start: 2020-05-19 | End: 2022-07-27

## 2020-05-19 RX ORDER — LANCETS
EACH MISCELLANEOUS
Qty: 400 EACH | Refills: 3 | Status: SHIPPED | OUTPATIENT
Start: 2020-05-19 | End: 2021-05-12

## 2020-05-19 NOTE — TELEPHONE ENCOUNTER
Received cover my meds form for strips (bG)  Sent rx for new meter, strips, lancets  Preferred one touch

## 2020-06-01 RX ORDER — PEN NEEDLE, DIABETIC 30 GX3/16"
NEEDLE, DISPOSABLE MISCELLANEOUS
Qty: 400 EACH | Refills: 3 | Status: SHIPPED | OUTPATIENT
Start: 2020-06-01 | End: 2021-06-22 | Stop reason: SDUPTHER

## 2020-07-10 ENCOUNTER — TELEPHONE (OUTPATIENT)
Dept: INTERNAL MEDICINE | Facility: CLINIC | Age: 65
End: 2020-07-10

## 2020-07-10 NOTE — TELEPHONE ENCOUNTER
----- Message from Neda Ballard sent at 7/10/2020 11:25 AM CDT -----  Type:  Needs Medical Advice    Who Called: román       Would the patient rather a call back or a response via [facility] call back     Best Call Back Number: valsartan (DIOVAN) 160 MG tablet    Additional Information: román would like to speak with the nurse about getting this medication doage higher valsartan (DIOVAN) 160 MG tablet. The pt is saying his BP is still high

## 2020-07-11 ENCOUNTER — OFFICE VISIT (OUTPATIENT)
Dept: INTERNAL MEDICINE | Facility: CLINIC | Age: 65
End: 2020-07-11
Payer: COMMERCIAL

## 2020-07-11 DIAGNOSIS — E11.3393 TYPE 2 DIABETES MELLITUS WITH BOTH EYES AFFECTED BY MODERATE NONPROLIFERATIVE RETINOPATHY WITHOUT MACULAR EDEMA, WITHOUT LONG-TERM CURRENT USE OF INSULIN: ICD-10-CM

## 2020-07-11 DIAGNOSIS — I10 ESSENTIAL HYPERTENSION: Primary | ICD-10-CM

## 2020-07-11 PROCEDURE — 99213 PR OFFICE/OUTPT VISIT, EST, LEVL III, 20-29 MIN: ICD-10-PCS | Mod: 95,,, | Performed by: INTERNAL MEDICINE

## 2020-07-11 PROCEDURE — 99213 OFFICE O/P EST LOW 20 MIN: CPT | Mod: 95,,, | Performed by: INTERNAL MEDICINE

## 2020-07-11 RX ORDER — VALSARTAN 320 MG/1
320 TABLET ORAL DAILY
Qty: 90 TABLET | Refills: 3 | Status: SHIPPED | OUTPATIENT
Start: 2020-07-11 | End: 2021-06-22 | Stop reason: SDUPTHER

## 2020-07-11 NOTE — PROGRESS NOTES
Established Patient - Audio Only Telehealth Visit     The patient location is: patient's home  The chief complaint leading to consultation is: HTN  Visit type: Virtual visit with audio only (telephone)  Total time spent with patient: 8 minutes       The reason for the audio only service rather than synchronous audio and video virtual visit was related to technical difficulties or patient preference/necessity.     Each patient to whom I provide medical services by telemedicine is:  (1) informed of the relationship between the physician and patient and the respective role of any other health care provider with respect to management of the patient; and (2) notified that they may decline to receive medical services by telemedicine and may withdraw from such care at any time. Patient verbally consented to receive this service via voice-only telephone call.       HPI:   Reports elevated blood pressure readings at home.     This am 122/70 when standing then 135/76 sitting but usually seeing -160/70 generally.     Nifedipine 30mg xl at night  Valsartan 160mg in am    He does have salt in diet but tries to limit it.     a1c in January was 7.1%  levemir 35 u qhs  humalog   He reports BG has been good.     Assessment and plan:      Essential hypertension  -   INCREASE  valsartan (DIOVAN) 320 MG tablet; Take 1 tablet (320 mg total) by mouth once daily.  Dispense: 90 tablet; Refill: 3 FROM 160MG TO 320MG  Follow up with Randi Mejia in 2 months    Type 2 diabetes mellitus with both eyes affected by moderate nonproliferative retinopathy without macular edema, without long-term current use of insulin  -     Comprehensive metabolic panel; Future; Expected date: 07/11/2020  -     Hemoglobin A1C; Future; Expected date: 07/11/2020           This service was not originating from a related E/M service provided within the previous 7 days nor will  to an E/M service or procedure within the next 24 hours or my soonest  available appointment.  Prevailing standard of care was able to be met in this audio-only visit.

## 2020-07-16 DIAGNOSIS — R06.09 DOE (DYSPNEA ON EXERTION): ICD-10-CM

## 2020-07-16 DIAGNOSIS — I48.91 NEW ONSET A-FIB: ICD-10-CM

## 2020-07-17 DIAGNOSIS — I48.91 NEW ONSET A-FIB: ICD-10-CM

## 2020-07-17 DIAGNOSIS — R06.09 DOE (DYSPNEA ON EXERTION): ICD-10-CM

## 2020-07-17 NOTE — PROGRESS NOTES
Refill Routing Note   Medication(s) are not appropriate for processing by [facility]:    - Outside of Protocol       Automatic Epic Protocol Generated Data:    Requested Prescriptions   Pending Prescriptions Disp Refills    apixaban (ELIQUIS) 5 mg Tab 60 tablet 11     Sig: Take 1 tablet (5 mg total) by mouth 2 (two) times daily.       There is no refill protocol information for this order           Appointments henp67t or future 3m with PCP    Date Provider   Last Visit   7/11/2020 Mima Mack MD   Next Visit   Visit date not found Mima Mack MD   ED visits in past 90 days: 0     Note composed:11:19 AM 07/17/2020

## 2020-07-31 DIAGNOSIS — E78.5 HYPERLIPIDEMIA, UNSPECIFIED HYPERLIPIDEMIA TYPE: ICD-10-CM

## 2020-07-31 RX ORDER — FLECAINIDE ACETATE 100 MG/1
100 TABLET ORAL EVERY 12 HOURS
Qty: 60 TABLET | Refills: 3 | Status: CANCELLED | OUTPATIENT
Start: 2020-07-31

## 2020-07-31 RX ORDER — ATORVASTATIN CALCIUM 40 MG/1
40 TABLET, FILM COATED ORAL DAILY
Qty: 90 TABLET | Refills: 0 | Status: SHIPPED | OUTPATIENT
Start: 2020-07-31 | End: 2020-10-27 | Stop reason: SDUPTHER

## 2020-07-31 NOTE — PROGRESS NOTES
Refill Authorization Note     is requesting a refill authorization.    Brief assessment and rationale for refill: ROUTE: flecainide -op // APPROVE: atorvastatin -needs labs     Medication-related problems identified:   Requires labs  Therapeutic duplication    Medication Therapy Plan: CDMR. NTBO(LIPID); NTBS(CMP/A1C/LIPID); Route flecainide; Approve atorvastatin    Medication reconciliation completed: No                         Comments:   Automatic Epic Protocol Generated Data:    Requested Prescriptions   Pending Prescriptions Disp Refills    flecainide (TAMBOCOR) 100 MG Tab 60 tablet 3     Sig: Take 1 tablet (100 mg total) by mouth every 12 (twelve) hours.       Off-Protocol Failed - 7/31/2020  3:08 PM        Failed - Medication not assigned to a protocol, review manually.        Passed - Office visit in past 12 months or future 90 days.     Recent Outpatient Visits            2 weeks ago Essential hypertension    [facility] - Internal Medicine Mima Mack MD    8 months ago Eye exam, routine    [facility] - Optometry Gabriel Radford OD    8 months ago S/P cadaveric kidney transplant 11/5/2016. ESRD secondary to HTN/DMII    [facility]- Transplant Sylvie Frias MD    9 months ago Hyperlipidemia, unspecified hyperlipidemia type    [facility] - Cardiology Mishel Posada MD    9 months ago Chronic kidney disease, stage II (mild)    [facility] - Nephrology Olayinka Ewing MD          Future Appointments              In 1 month LAB, SAME DAY [facility], [facility]    In 1 month MARK Zeng - Internal Medicine, [facility]                 Signed Prescriptions Disp Refills    atorvastatin (LIPITOR) 40 MG tablet 90 tablet 0     Sig: Take 1 tablet (40 mg total) by mouth once daily.       Cardiovascular:  Antilipid - Statins Passed - 7/31/2020  3:08 PM        Passed - Patient is at least 18 years old        Passed - Office visit in past 12 months or  future 90 days.     Recent Outpatient Visits            2 weeks ago Essential hypertension    [facility] - Internal Medicine Mima Mack MD    8 months ago Eye exam, routine    [facility] - Optometry Gabriel Radford, OD    8 months ago S/P cadaveric kidney transplant 11/5/2016. ESRD secondary to HTN/DMII    [facility]- Transplant Sylvie Frias MD    9 months ago Hyperlipidemia, unspecified hyperlipidemia type    [facility] - Cardiology Mishel Posada MD    9 months ago Chronic kidney disease, stage II (mild)    [facility] - Nephrology Olayinka Ewing MD          Future Appointments              In 1 month LAB, SAME DAY [facility]    In 1 month MARK Zeng - Internal Medicine, [facility] PC[facility]                Passed - Lipid Panel completed in last 360 days     Lab Results   Component Value Date    CHOL 151 10/23/2019    HDL 37 (L) 10/23/2019    LDLCALC 84.8 10/23/2019    TRIG 146 10/23/2019             Passed - ALT is 94 or below and within 360 days     ALT   Date Value Ref Range Status   01/20/2020 5 (L) 10 - 44 U/L Final   10/23/2019 <5 (L) 10 - 44 U/L Final   10/23/2019 <5 (L) 10 - 44 U/L Final              Passed - AST is 54 or below and within 360 days     AST   Date Value Ref Range Status   01/20/2020 13 10 - 40 U/L Final   10/23/2019 13 10 - 40 U/L Final   10/23/2019 13 10 - 40 U/L Final                    Appointments  past 12m or future 3m with PCP    Date Provider   Last Visit   7/11/2020 Mima Mack MD   Next Visit   Visit date not found Mima Mack MD   ED visits in past 90 days: 0     Note composed:3:42 PM 07/31/2020

## 2020-07-31 NOTE — TELEPHONE ENCOUNTER
Care Due:                  Date            Visit Type   Department     Provider  --------------------------------------------------------------------------------                                             [facility]  Last Visit: 07-      None         MEDICINE       CARRIE BUSTILLOS  Next Visit: None Scheduled  None         None Found                                                            Last  Test          Frequency    Reason                     Performed    Due Date  --------------------------------------------------------------------------------    HDL.........  12 months..  atorvastatin.............  10-   10-    LDL.........  12 months..  atorvastatin.............  10-   10-    Total         12 months..  atorvastatin.............  10-   10-  Cholesterol.    Triglyceride  12 months..  atorvastatin.............  10-   10-  s...........    Powered by [employer]. Reference number: [id number]. 7/31/2020 12:36:43 PM   CDT

## 2020-08-03 RX ORDER — FLECAINIDE ACETATE 100 MG/1
100 TABLET ORAL EVERY 12 HOURS
Qty: 60 TABLET | Refills: 0 | Status: SHIPPED | OUTPATIENT
Start: 2020-08-03 | End: 2020-09-03 | Stop reason: SDUPTHER

## 2020-08-19 DIAGNOSIS — E11.42 DIABETIC POLYNEUROPATHY ASSOCIATED WITH TYPE 2 DIABETES MELLITUS: ICD-10-CM

## 2020-08-19 RX ORDER — GABAPENTIN 300 MG/1
CAPSULE ORAL
Qty: 450 CAPSULE | Refills: 3 | Status: SHIPPED | OUTPATIENT
Start: 2020-08-19 | End: 2021-08-24 | Stop reason: SDUPTHER

## 2020-08-19 RX ORDER — INSULIN DEGLUDEC 100 U/ML
INJECTION, SOLUTION SUBCUTANEOUS
Qty: 12 ML | Refills: 6 | OUTPATIENT
Start: 2020-08-19

## 2020-08-19 RX ORDER — INSULIN LISPRO 100 [IU]/ML
INJECTION, SOLUTION INTRAVENOUS; SUBCUTANEOUS
Qty: 30 ML | Refills: 4 | Status: SHIPPED | OUTPATIENT
Start: 2020-08-19 | End: 2021-05-31 | Stop reason: SDUPTHER

## 2020-08-19 RX ORDER — GABAPENTIN 300 MG/1
CAPSULE ORAL
Qty: 450 CAPSULE | Refills: 3 | Status: CANCELLED | OUTPATIENT
Start: 2020-08-19

## 2020-08-19 RX ORDER — NIFEDIPINE 30 MG/1
30 TABLET, EXTENDED RELEASE ORAL DAILY
Qty: 30 TABLET | Refills: 0 | Status: SHIPPED | OUTPATIENT
Start: 2020-08-19 | End: 2020-09-15 | Stop reason: SDUPTHER

## 2020-08-19 RX ORDER — INSULIN GLARGINE 100 [IU]/ML
INJECTION, SOLUTION SUBCUTANEOUS
Qty: 12 ML | Refills: 0 | Status: SHIPPED | OUTPATIENT
Start: 2020-08-19 | End: 2020-09-28 | Stop reason: SDUPTHER

## 2020-08-19 NOTE — TELEPHONE ENCOUNTER
No new care gaps identified.  Powered by [employer]. Reference number: [id number]. 8/19/2020 12:25:10 PM   CDT

## 2020-08-19 NOTE — PROGRESS NOTES
Refill Routing Note   Medication(s) are not appropriate for processing by [facility]:       - Outside of protocol  - last commented as no longer taking  - Medication not active on medication list        Medication Therapy Plan: CDMR. Tresiba maryo dc'd and substituted for lantus(8/19/20) by REYES Rai ; Defer tresiba; Route gabapentin  Medication reconciliation completed: No      Automatic Epic Generated Protocol Data:        Requested Prescriptions   Pending Prescriptions Disp Refills    gabapentin (NEURONTIN) 300 MG capsule 450 capsule 3     Sig: Take 1 tablet in the morning; 1 tablet midday and 3 tablets at night.       Anticonvulsants Protocol Passed - 8/19/2020 12:24 PM        Passed - Visit with Authorizing provider in past 9 months or upcoming 90 days          insulin degludec (TRESIBA FLEXTOUCH U-100) 100 unit/mL (3 mL) InPn 12 mL 6     Sig: Inject 35 units at night       Endocrinology:  Diabetes - Insulins Failed - 8/19/2020 12:24 PM        Failed - HBA1C is 7.9 or below and within 180 days     Hemoglobin A1C   Date Value Ref Range Status   01/20/2020 7.1 (H) 4.0 - 5.6 % Final     Comment:     ADA Screening Guidelines:  5.7-6.4%  Consistent with prediabetes  >or=6.5%  Consistent with diabetes  High levels of fetal hemoglobin interfere with the HbA1C  assay. Heterozygous hemoglobin variants (HbS, HgC, etc)do  not significantly interfere with this assay.   However, presence of multiple variants may affect accuracy.     10/23/2019 7.5 (H) 4.0 - 5.6 % Final     Comment:     ADA Screening Guidelines:  5.7-6.4%  Consistent with prediabetes  >or=6.5%  Consistent with diabetes  High levels of fetal hemoglobin interfere with the HbA1C  assay. Heterozygous hemoglobin variants (HbS, HgC, etc)do  not significantly interfere with this assay.   However, presence of multiple variants may affect accuracy.     08/01/2019 8.6 (H) 4.0 - 5.6 % Final     Comment:     ADA Screening Guidelines:  5.7-6.4%   Consistent with prediabetes  >or=6.5%  Consistent with diabetes  High levels of fetal hemoglobin interfere with the HbA1C  assay. Heterozygous hemoglobin variants (HbS, HgC, etc)do  not significantly interfere with this assay.   However, presence of multiple variants may affect accuracy.                Passed - Patient is at least 18 years old        Passed - Office visit in past 12 months or future 90 days.     Recent Outpatient Visits            1 month ago Essential hypertension    [facility] - Internal Medicine Mima Mack MD    9 months ago Eye exam, routine    [facility] - Optometry Gabriel Radford OD    9 months ago S/P cadaveric kidney transplant 11/5/2016. ESRD secondary to HTN/DMII    [facility]- Transplant 1st Fl Sylvie Frias MD    9 months ago Hyperlipidemia, unspecified hyperlipidemia type    [facility]-Cardiology Svcs 3rd Floor Mishel Posada MD    9 months ago Chronic kidney disease, stage II (mild)    [facility] - Nephrology 5th Fl Olayinka Ewing MD          Future Appointments              In 3 weeks LAB, SAME DAY [facility], [facility] PCW    In 3 weeks Randi Mejia PA-C [facility] - Internal Medicine, [facility] PCW                Passed - eGFR is 30 or above and within 360 days     eGFR if non    Date Value Ref Range Status   01/20/2020 >60.0 >60 mL/min/1.73 m^2 Final     Comment:     Calculation used to obtain the estimated glomerular filtration  rate (eGFR) is the CKD-EPI equation.      12/03/2019 >60.0 >60 mL/min/1.73 m^2 Final     Comment:     Calculation used to obtain the estimated glomerular filtration  rate (eGFR) is the CKD-EPI equation.      11/19/2019 >60.0 >60 mL/min/1.73 m^2 Final     Comment:     Calculation used to obtain the estimated glomerular filtration  rate (eGFR) is the CKD-EPI equation.        eGFR if    Date Value Ref Range Status   01/20/2020 >60.0 >60 mL/min/1.73 m^2 Final   12/03/2019 >60.0 >60  mL/min/1.73 m^2 Final   11/19/2019 >60.0 >60 mL/min/1.73 m^2 Final              Passed - Cr is 1.3 or below and within 360 days     Creatinine   Date Value Ref Range Status   01/20/2020 0.9 0.5 - 1.4 mg/dL Final   12/03/2019 1.1 0.5 - 1.4 mg/dL Final   11/19/2019 1.1 0.5 - 1.4 mg/dL Final                    Appointments  past 12m or future 3m with PCP    Date Provider   Last Visit   7/11/2020 Mima Mack MD   Next Visit   Visit date not found Mima Mack MD   ED visits in past 90 days: 0     Note composed:3:44 PM 08/19/2020

## 2020-08-19 NOTE — TELEPHONE ENCOUNTER
----- Message from Pao Villafana sent at 8/19/2020 10:27 AM CDT -----  Contact: Pt [phone]  Requesting an RX refill or new RX.  Is this a refill or new RX:  refill  RX name and strength: gabapentin (NEURONTIN) 300 MG capsule  Directions (copy/paste from chart):    Is this a 30 day or 90 day RX:    Local pharmacy or mail order pharmacy:  local  Pharmacy name and phone # (copy/paste from chart):   [facility] [phone] (Phone)  [phone] (Fax)    Comments:      Requesting an RX refill or new RX.  Is this a refill or new RX:  refill  RX name and strength: flecainide (TAMBOCOR) 100 MG Tab  Directions (copy/paste from chart):    Is this a 30 day or 90 day RX:    Local pharmacy or mail order pharmacy:  local  Pharmacy name and phone # (copy/paste from chart):   [facility] [phone] (Phone)  [phone] (Fax)    Comments:      Requesting an RX refill or new RX.  Is this a refill or new RX:  refill  RX name and strength: insulin lispro (HUMALOG KWIKPEN INSULIN) 100 unit/mL pen  Directions (copy/paste from chart):    Is this a 30 day or 90 day RX:    Local pharmacy or mail order pharmacy:  local  Pharmacy name and phone # (copy/paste from chart):   [facility] [phone] (Phone)  [phone] (Fax)    Comments:  Patient is requesting a call about insulin degludec (TRESIBA FLEXTOUCH U-100) 100 unit/mL (3 mL) InPn. State that his insurance company will no longer cover it. Wants to know what else he can get that's similar.

## 2020-08-19 NOTE — TELEPHONE ENCOUNTER
No new care gaps identified.  Powered by [employer]. Reference number: [id number]. 8/19/2020 10:17:53 AM   AMADEOT

## 2020-08-19 NOTE — TELEPHONE ENCOUNTER
----- Message from Pao Villafana sent at 8/19/2020 10:27 AM CDT -----  Contact: Pt [phone]  Requesting an RX refill or new RX.  Is this a refill or new RX:  refill  RX name and strength: gabapentin (NEURONTIN) 300 MG capsule  Directions (copy/paste from chart):    Is this a 30 day or 90 day RX:    Local pharmacy or mail order pharmacy:  local  Pharmacy name and phone # (copy/paste from chart):   [facility] [phone] (Phone)  [phone] (Fax)    Comments:      Requesting an RX refill or new RX.  Is this a refill or new RX:  refill  RX name and strength: flecainide (TAMBOCOR) 100 MG Tab  Directions (copy/paste from chart):    Is this a 30 day or 90 day RX:    Local pharmacy or mail order pharmacy:  local  Pharmacy name and phone # (copy/paste from chart):   [facility] [phone] (Phone)  [phone] (Fax)    Comments:      Requesting an RX refill or new RX.  Is this a refill or new RX:  refill  RX name and strength: insulin lispro (HUMALOG KWIKPEN INSULIN) 100 unit/mL pen  Directions (copy/paste from chart):    Is this a 30 day or 90 day RX:    Local pharmacy or mail order pharmacy:  local  Pharmacy name and phone # (copy/paste from chart):   [facility] [phone] (Phone)  [phone] (Fax)    Comments:  Patient is requesting a call about insulin degludec (TRESIBA FLEXTOUCH U-100) 100 unit/mL (3 mL) InPn. State that his insurance company will no longer cover it. Wants to know what else he can get that's similar.

## 2020-09-01 RX ORDER — FLECAINIDE ACETATE 100 MG/1
100 TABLET ORAL EVERY 12 HOURS
Qty: 60 TABLET | Refills: 0 | Status: CANCELLED | OUTPATIENT
Start: 2020-09-01

## 2020-09-02 RX ORDER — FLECAINIDE ACETATE 100 MG/1
100 TABLET ORAL EVERY 12 HOURS
Qty: 60 TABLET | Refills: 0 | Status: CANCELLED | OUTPATIENT
Start: 2020-09-02

## 2020-09-02 NOTE — TELEPHONE ENCOUNTER
----- Message from Niyah Auguste sent at 9/2/2020  3:16 PM CDT -----  Contact: pharmacy/  Regarding: 3nd message  Contact: [phone]  Requesting an RX refill or new RX.  Is this a refill or new RX:  refill   RX name and strength: flecainide (TAMBOCOR) 100 MG Tab  Directions (copy/paste from chart):    Is this a 30 day or 90 day RX:    Local pharmacy or mail order pharmacy: local pharmacy   Pharmacy name and phone # (copy/paste from chart): [facility] [phone] (Phone)  [phone] (Fax)         Comments:    Please advise

## 2020-09-02 NOTE — TELEPHONE ENCOUNTER
----- Message from Syl Moeller sent at 2020  9:10 AM CDT -----  Regardinnd message  Contact: [phone]  Requesting an RX refill or new RX.  Is this a refill or new RX:  refill   RX name and strength: flecainide (TAMBOCOR) 100 MG Tab  Directions (copy/paste from chart):    Is this a 30 day or 90 day RX:    Local pharmacy or mail order pharmacy: local pharmacy   Pharmacy name and phone # (copy/paste from chart): [facility] [phone] (Phone)  [phone] (Fax)        Comments:

## 2020-09-03 DIAGNOSIS — I48.19 PERSISTENT ATRIAL FIBRILLATION: Primary | ICD-10-CM

## 2020-09-03 RX ORDER — FLECAINIDE ACETATE 100 MG/1
100 TABLET ORAL EVERY 12 HOURS
Qty: 60 TABLET | Refills: 0 | OUTPATIENT
Start: 2020-09-03

## 2020-09-03 RX ORDER — FLECAINIDE ACETATE 100 MG/1
100 TABLET ORAL EVERY 12 HOURS
Qty: 60 TABLET | Refills: 0 | Status: CANCELLED | OUTPATIENT
Start: 2020-09-03

## 2020-09-03 RX ORDER — FLECAINIDE ACETATE 100 MG/1
100 TABLET ORAL EVERY 12 HOURS
Qty: 60 TABLET | Refills: 0 | Status: SHIPPED | OUTPATIENT
Start: 2020-09-03 | End: 2020-09-28 | Stop reason: SDUPTHER

## 2020-09-03 NOTE — PROGRESS NOTES
Refill Routing Note   Medication(s) are not appropriate for processing by [facility]:       - Outside of protocol        Medication Therapy Plan: Pt scheduled appt with PA on 9/14/2020; route op  Medication reconciliation completed: No      Automatic Epic Generated Protocol Data:        Requested Prescriptions   Pending Prescriptions Disp Refills    flecainide (TAMBOCOR) 100 MG Tab 60 tablet 0     Sig: Take 1 tablet (100 mg total) by mouth every 12 (twelve) hours.       Off-Protocol Failed - 9/3/2020 10:50 AM        Failed - Medication not assigned to a protocol, review manually.        Passed - Office visit in past 12 months or future 90 days.     Recent Outpatient Visits            1 month ago Essential hypertension    [facility] Primary Care [facility] Mima Mack MD    9 months ago Eye exam, routine    [facility] - Optometry Gabriel Radford, OD    9 months ago S/P cadaveric kidney transplant 11/5/2016. ESRD secondary to HTN/DMII    [facility]- Transplant 1st Fl Sylvie Frias MD    10 months ago Hyperlipidemia, unspecified hyperlipidemia type    [facility]-Cardiology Sv[facility] 3rd Floor Mishel Posada MD    10 months ago Chronic kidney disease, stage II (mild)    [facility] - Nephrology 5th Fl Olayinka Ewing MD          Future Appointments              In 1 week LAB, SAME DAY [facility] - Primary Care [facility], [facility] PCW    In 1 week ANN-MARIE Zeng-FAHEEM [facility] Primary Care [facility], [facility] PCW               Off-Protocol Failed - 9/3/2020 10:50 AM        Failed - Medication not assigned to a protocol, review manually.        Passed - Office visit in past 12 months or future 90 days.     Recent Outpatient Visits            1 month ago Essential hypertension    [facility] Primary Care [facility] Mima Mack MD    9 months ago Eye exam, routine    [facility] - Optometry Gabriel Radford, OD    9 months ago S/P cadaveric kidney transplant 11/5/2016. ESRD secondary to HTN/DMII     [facility]- Transplant 1st Fl Sylvie Frias MD    10 months ago Hyperlipidemia, unspecified hyperlipidemia type    [facility] 3rd Floor Mishel Posada MD    10 months ago Chronic kidney disease, stage II (mild)    [facility] - Nephrology 5th Fl Olayinka Ewing MD          Future Appointments              In 1 week LAB, SAME DAY [facility] - Primary Care Bldg, [facility] PCW    In 1 week MARK Zeng Int Med Primary Care Bldg, [facility] PCW                      Appointments  past 12m or future 3m with PCP    Date Provider   Last Visit   7/11/2020 Mima Mack MD   Next Visit   9/2/2020 Mima Mack MD   ED visits in past 90 days: 0     Note composed:11:21 AM 09/03/2020

## 2020-09-03 NOTE — TELEPHONE ENCOUNTER
----- Message from Uzma Shannon sent at 9/3/2020  1:44 PM CDT -----  Contact: NEGAR BANKS [[id number]] @ [phone]  Requesting an RX refill or new RX.  Is this a refill or new RX:  Refill  RX name and strength: flecainide (TAMBOCOR) 100 MG Tab  Directions (copy/paste from chart):Take 1 tablet (100 mg total) by mouth every 12 (twelve) hours. - Oral  Is this a 30 day or 90 day RX:    Local pharmacy or mail order pharmacy: local    Pharmacy name and phone # (copy/paste from chart): [facility]  [phone]       Comments:  Patient has an appointment with Randi Mejia on 9/14. Patient ask that you call in enough medication to last him until his appointment due to being completely out.

## 2020-09-03 NOTE — TELEPHONE ENCOUNTER
----- Message from Pearl Bliss sent at 9/3/2020 10:37 AM CDT -----  Contact: self  or [phone]  Requesting an RX refill or new RX.  Is this a refill or new RX:  new  RX name and strength: flecainide (TAMBOCOR) 100 MG Tab  Directions (copy/paste from chart):  Take 1 tablet (100 mg total) by mouth every 12 (twelve) hours  Is this a 30 day or 90 day RX:  30  Local pharmacy or mail order pharmacy:  local  Pharmacy name and phone # (copy/paste from chart):   [facility] [phone] (Phone)  [phone] (Fax)  Comments:  Pt has appt with Randi Mejia on 9/14

## 2020-09-03 NOTE — PROGRESS NOTES
Quick DC. Duplicate Request   Refill Authorization Note   Ravi Zamorano is requesting a refill authorization.    Brief assessment and rationale for refill: QUICK DC: Duplicate             Medication reconciliation completed: No       Comments:   Pended Medication(s)       Requested Prescriptions     Pending Prescriptions Disp Refills    flecainide (TAMBOCOR) 100 MG Tab 60 tablet 0     Sig: Take 1 tablet (100 mg total) by mouth every 12 (twelve) hours.        Duplicate Pended Encounter(s)/ Last Prescribed Details:    Ordering Encounter Report    Associated Reports   View Encounter        Note composed:11:23 AM 09/03/2020

## 2020-09-08 NOTE — PROGRESS NOTES
Mr. Zamorano is a patient of Dr. Bowden and was last seen in clinic 10/3/2019.      Subjective:   Patient ID:  Ravi Zamorano is a 64 y.o. male who presents for follow-up of Atrial Fibrillation  .     HPI:    Mr. Zamorano is a 64 y.o. male with DM, CKD, HFpEF, AF here for follow up.    Background:    Cardiologist: Mishel Posada MD    Ravi Zamorano has a history of DM on insulin, CKD2, history of kidney transplant in 2016 (Cr 1.2 in 8/2019), and diastolic dysfunction, who has a several month history of DOSS. He was seen in cardiology clinic (8/1/2019) where an ECG showed rate controlled AF (last ECG in the EMR from 3/15/2019 shows sinus rhythm). He was placed on Eliquis 5 mg bid, and presents to me today to discuss management options.    Recent cardiac studies include an echo performed in 8/2019 which showed an EF of 55% and severe LAE (MINA 54.3 mL/m2).     Underwent successful cardioversion on 8/26/2019 and flecainide 100mg BID was initiated. Felt great afterward.    Update (09/10/2020):    Today he says he continues to feel well. No cardiac complaints. Mr. Zamorano denies chest pain with exertion or at rest, palpitations, SOB, DOSS, dizziness, or syncope.    He is currently taking eliquis 5mg BID for stroke prophylaxis and denies significant bleeding episodes. He is currently being treated with flecainide 100mg BID for rhythm control and carvedilol 25mg BID for HR control.  Kidney function is stable, with a creatinine of 0.9 on 1/20/2020.    I have personally reviewed the patient's EKG today, which shows sinus rhythm with 1st degree AVB at 59bpm. RI interval is 240. QRS is 112. QT is 424.    Recent Cardiac Tests:    DSE (8/19/2019):  · Concentric left ventricular remodeling. Normal left ventricular systolic function. The estimated ejection fraction is 60%.  · Moderate left atrial enlargement.  · Normal LV diastolic function.  · Normal right ventricular systolic function.  · Normal central venous pressure (3 mm  Hg).  · The estimated PA systolic pressure is 34 mm Hg  · The patient reached the end of the protocol.  · The EKG portion of this study is negative for myocardial ischemia.  · The stress echo portion of this study is negative for myocardial ischemia.    Current Outpatient Medications   Medication Sig    apixaban (ELIQUIS) 5 mg Tab Take 1 tablet (5 mg total) by mouth 2 (two) times daily.    aspirin 81 MG Chew Take 81 mg by mouth once daily.    atorvastatin (LIPITOR) 40 MG tablet Take 1 tablet (40 mg total) by mouth once daily.    blood sugar diagnostic Strp To check BG 4 times daily, to use with insurance preferred meter-one touch    blood-glucose meter kit To check BG 4 times daily, to use with insurance preferred meter-one touch    carvediloL (COREG) 25 MG tablet Take 1 tablet (25 mg total) by mouth 2 (two) times daily.    famotidine (PEPCID) 20 MG tablet Take 1 tablet (20 mg total) by mouth every evening.    flecainide (TAMBOCOR) 100 MG Tab Take 1 tablet (100 mg total) by mouth every 12 (twelve) hours.    gabapentin (NEURONTIN) 300 MG capsule Take 1 tablet in the morning; 1 tablet midday, and 3 tablets at night.    insulin (LANTUS SOLOSTAR U-100 INSULIN) glargine 100 units/mL (3mL) SubQ pen Inject 35 units at night    insulin detemir U-100 (LEVEMIR FLEXTOUCH U-100 INSULN) 100 unit/mL (3 mL) SubQ InPn pen Inject 35 units at night. Voucher placed with rx via [employer].    insulin lispro (HUMALOG KWIKPEN INSULIN) 100 unit/mL pen Inject 18 units w/ breakfast, 16 units w/ lunch and dinner plus scale 150-200 +2, 201-250 +4, 251-300 +6, 301-350 +8.    lancets Misc To check BG 4 times daily, to use with insurance preferred meter-one touch    meclizine (ANTIVERT) 25 mg tablet Take 1 tablet (25 mg total) by mouth 3 (three) times daily as needed for Dizziness.    melatonin 5 mg Tab Take 1 tablet (5 mg total) by mouth every evening.    mycophenolate (CELLCEPT) 250 mg Cap Take 4 capsules (1,000 mg total) by  "mouth 2 (two) times daily.    NIFEdipine (PROCARDIA-XL) 30 MG (OSM) 24 hr tablet Take 1 tablet (30 mg total) by mouth once daily.    pen needle, diabetic 32 gauge x 5/32" Ndle USE TO ADMINISTER INSULIN 4 TIMES DAILY.    predniSONE (DELTASONE) 5 MG tablet Take 1 tablet (5 mg total) by mouth once daily.    tacrolimus (PROGRAF) 0.5 MG Cap Take 2 capsules (1 mg total) by mouth every morning AND 1 capsule (0.5 mg total) every evening.    valsartan (DIOVAN) 320 MG tablet Take 1 tablet (320 mg total) by mouth once daily.     No current facility-administered medications for this visit.        Review of Systems   Constitution: Negative for malaise/fatigue.   Cardiovascular: Negative for chest pain, dyspnea on exertion, irregular heartbeat, leg swelling and palpitations.   Respiratory: Negative for shortness of breath.    Hematologic/Lymphatic: Negative for bleeding problem.   Skin: Negative for rash.   Musculoskeletal: Negative for myalgias.   Gastrointestinal: Negative for hematemesis, hematochezia and nausea.   Genitourinary: Negative for hematuria.   Neurological: Negative for light-headedness.   Psychiatric/Behavioral: Negative for altered mental status.   Allergic/Immunologic: Negative for persistent infections.     Objective:        BP (!) 142/67   Pulse (!) 59   Ht 6' 1" (1.854 m)   Wt 93.8 kg (206 lb 12.7 oz)   BMI 27.28 kg/m²     Physical Exam   Constitutional: He is oriented to person, place, and time. He appears well-developed and well-nourished.   HENT:   Head: Normocephalic.   Nose: Nose normal.   Eyes: Pupils are equal, round, and reactive to light.   Cardiovascular: Normal rate, regular rhythm, S1 normal and S2 normal.   No murmur heard.  Pulses:       Radial pulses are 2+ on the right side and 2+ on the left side.   Pulmonary/Chest: Breath sounds normal. No respiratory distress.   Abdominal: Normal appearance.   Musculoskeletal: Normal range of motion.         General: No edema.   Neurological: He is " alert and oriented to person, place, and time.   Skin: Skin is warm and dry. No erythema.   Psychiatric: He has a normal mood and affect. His speech is normal and behavior is normal.   Nursing note and vitals reviewed.    Lab Results   Component Value Date     01/20/2020    K 3.7 01/20/2020    MG 1.3 (L) 11/19/2019    BUN 9 01/20/2020    CREATININE 0.9 01/20/2020    ALT 5 (L) 01/20/2020    AST 13 01/20/2020    HGB 12.8 (L) 11/19/2019    HCT 42.3 11/19/2019    HCT 31 (L) 11/05/2016    TSH 1.143 03/15/2019    LDLCALC 84.8 10/23/2019       Recent Labs   Lab 03/15/19  1837 08/19/19  0653   INR 1.0 1.1       Assessment:     1. Persistent atrial fibrillation    2. Essential hypertension    3. Anticoagulant long-term use    4. History of cardioversion      Plan:     In summary, Mr. Zamorano is a 64 y.o. male with DM, CKD, HFpEF, AF here for follow up.  He is doing well from a rhythm standpoint, with no documented or symptomatic recurrence of arrhythmia since cardioversion and initiation of flecainide.  Feels great. On eliquis for CVA prophylaxis. EKG with stable intervals. He reports his BPs better at home.     Continue current medications.  RTC 6 mo, sooner if needed.    *A copy of this note has been sent to Dr. Bowden*    Follow up in about 6 months (around 3/10/2021).    ------------------------------------------------------------------    REYES Flynn, NP-C  Cardiac Electrophysiology

## 2020-09-10 ENCOUNTER — PATIENT OUTREACH (OUTPATIENT)
Dept: ADMINISTRATIVE | Facility: OTHER | Age: 65
End: 2020-09-10

## 2020-09-10 ENCOUNTER — OFFICE VISIT (OUTPATIENT)
Dept: ELECTROPHYSIOLOGY | Facility: CLINIC | Age: 65
End: 2020-09-10
Payer: COMMERCIAL

## 2020-09-10 VITALS
HEART RATE: 59 BPM | HEIGHT: 73 IN | BODY MASS INDEX: 27.41 KG/M2 | DIASTOLIC BLOOD PRESSURE: 67 MMHG | WEIGHT: 206.81 LBS | SYSTOLIC BLOOD PRESSURE: 142 MMHG

## 2020-09-10 DIAGNOSIS — I48.19 PERSISTENT ATRIAL FIBRILLATION: Primary | ICD-10-CM

## 2020-09-10 DIAGNOSIS — I10 ESSENTIAL HYPERTENSION: ICD-10-CM

## 2020-09-10 DIAGNOSIS — Z92.89 HISTORY OF CARDIOVERSION: ICD-10-CM

## 2020-09-10 DIAGNOSIS — Z79.01 ANTICOAGULANT LONG-TERM USE: ICD-10-CM

## 2020-09-10 PROCEDURE — 99214 PR OFFICE/OUTPT VISIT, EST, LEVL IV, 30-39 MIN: ICD-10-PCS | Mod: 25,S$GLB,, | Performed by: NURSE PRACTITIONER

## 2020-09-10 PROCEDURE — 99214 OFFICE O/P EST MOD 30 MIN: CPT | Mod: 25,S$GLB,, | Performed by: NURSE PRACTITIONER

## 2020-09-10 PROCEDURE — 3077F SYST BP >= 140 MM HG: CPT | Mod: CPTII,S$GLB,, | Performed by: NURSE PRACTITIONER

## 2020-09-10 PROCEDURE — 93000 RHYTHM STRIP: ICD-10-PCS | Mod: S$GLB,ICN,, | Performed by: INTERNAL MEDICINE

## 2020-09-10 PROCEDURE — 3078F DIAST BP <80 MM HG: CPT | Mod: CPTII,S$GLB,, | Performed by: NURSE PRACTITIONER

## 2020-09-10 PROCEDURE — 99999 PR PBB SHADOW E&M-EST. PATIENT-LVL IV: ICD-10-PCS | Mod: PBBFAC,,, | Performed by: NURSE PRACTITIONER

## 2020-09-10 PROCEDURE — 3008F PR BODY MASS INDEX (BMI) DOCUMENTED: ICD-10-PCS | Mod: CPTII,S$GLB,, | Performed by: NURSE PRACTITIONER

## 2020-09-10 PROCEDURE — 3077F PR MOST RECENT SYSTOLIC BLOOD PRESSURE >= 140 MM HG: ICD-10-PCS | Mod: CPTII,S$GLB,, | Performed by: NURSE PRACTITIONER

## 2020-09-10 PROCEDURE — 3008F BODY MASS INDEX DOCD: CPT | Mod: CPTII,S$GLB,, | Performed by: NURSE PRACTITIONER

## 2020-09-10 PROCEDURE — 3078F PR MOST RECENT DIASTOLIC BLOOD PRESSURE < 80 MM HG: ICD-10-PCS | Mod: CPTII,S$GLB,, | Performed by: NURSE PRACTITIONER

## 2020-09-10 PROCEDURE — 93000 ELECTROCARDIOGRAM COMPLETE: CPT | Mod: S$GLB,ICN,, | Performed by: INTERNAL MEDICINE

## 2020-09-10 PROCEDURE — 99999 PR PBB SHADOW E&M-EST. PATIENT-LVL IV: CPT | Mod: PBBFAC,,, | Performed by: NURSE PRACTITIONER

## 2020-09-10 NOTE — PROGRESS NOTES
Chart reviewed.   Immunizations: updated  Orders placed: n./a  Upcoming appts to satisfy MOOKIE topics: Hemoglobin A1c 9/11

## 2020-09-11 ENCOUNTER — LAB VISIT (OUTPATIENT)
Dept: LAB | Facility: HOSPITAL | Age: 65
End: 2020-09-11
Attending: INTERNAL MEDICINE
Payer: COMMERCIAL

## 2020-09-11 DIAGNOSIS — E11.9 TYPE 2 DIABETES MELLITUS WITHOUT COMPLICATION: ICD-10-CM

## 2020-09-11 DIAGNOSIS — E11.3393 TYPE 2 DIABETES MELLITUS WITH BOTH EYES AFFECTED BY MODERATE NONPROLIFERATIVE RETINOPATHY WITHOUT MACULAR EDEMA, WITHOUT LONG-TERM CURRENT USE OF INSULIN: ICD-10-CM

## 2020-09-11 LAB
ALBUMIN SERPL BCP-MCNC: 2.9 G/DL (ref 3.5–5.2)
ALP SERPL-CCNC: 70 U/L (ref 55–135)
ALT SERPL W/O P-5'-P-CCNC: 6 U/L (ref 10–44)
ANION GAP SERPL CALC-SCNC: 7 MMOL/L (ref 8–16)
AST SERPL-CCNC: 13 U/L (ref 10–40)
BILIRUB SERPL-MCNC: 0.3 MG/DL (ref 0.1–1)
BUN SERPL-MCNC: 13 MG/DL (ref 8–23)
CALCIUM SERPL-MCNC: 8.2 MG/DL (ref 8.7–10.5)
CHLORIDE SERPL-SCNC: 104 MMOL/L (ref 95–110)
CO2 SERPL-SCNC: 26 MMOL/L (ref 23–29)
CREAT SERPL-MCNC: 1.2 MG/DL (ref 0.5–1.4)
EST. GFR  (AFRICAN AMERICAN): >60 ML/MIN/1.73 M^2
EST. GFR  (NON AFRICAN AMERICAN): >60 ML/MIN/1.73 M^2
ESTIMATED AVG GLUCOSE: 157 MG/DL (ref 68–131)
GLUCOSE SERPL-MCNC: 322 MG/DL (ref 70–110)
HBA1C MFR BLD HPLC: 7.1 % (ref 4–5.6)
POTASSIUM SERPL-SCNC: 3.5 MMOL/L (ref 3.5–5.1)
PROT SERPL-MCNC: 6.4 G/DL (ref 6–8.4)
SODIUM SERPL-SCNC: 137 MMOL/L (ref 136–145)

## 2020-09-11 PROCEDURE — 80053 COMPREHEN METABOLIC PANEL: CPT

## 2020-09-11 PROCEDURE — 36415 COLL VENOUS BLD VENIPUNCTURE: CPT

## 2020-09-11 PROCEDURE — 83036 HEMOGLOBIN GLYCOSYLATED A1C: CPT

## 2020-09-14 ENCOUNTER — TELEPHONE (OUTPATIENT)
Dept: INTERNAL MEDICINE | Facility: CLINIC | Age: 65
End: 2020-09-14

## 2020-09-14 ENCOUNTER — OFFICE VISIT (OUTPATIENT)
Dept: INTERNAL MEDICINE | Facility: CLINIC | Age: 65
End: 2020-09-14
Payer: COMMERCIAL

## 2020-09-14 VITALS
SYSTOLIC BLOOD PRESSURE: 136 MMHG | BODY MASS INDEX: 27.47 KG/M2 | HEIGHT: 73 IN | DIASTOLIC BLOOD PRESSURE: 84 MMHG | HEART RATE: 60 BPM | WEIGHT: 207.25 LBS

## 2020-09-14 DIAGNOSIS — Z94.0 S/P KIDNEY TRANSPLANT: ICD-10-CM

## 2020-09-14 DIAGNOSIS — Z86.19 HISTORY OF HEPATITIS C: ICD-10-CM

## 2020-09-14 DIAGNOSIS — I48.91 ATRIAL FIBRILLATION, UNSPECIFIED TYPE: ICD-10-CM

## 2020-09-14 DIAGNOSIS — Z92.89 HISTORY OF CARDIOVERSION: ICD-10-CM

## 2020-09-14 DIAGNOSIS — Z12.11 ENCOUNTER FOR SCREENING COLONOSCOPY: Primary | ICD-10-CM

## 2020-09-14 DIAGNOSIS — E78.5 HYPERLIPIDEMIA, UNSPECIFIED HYPERLIPIDEMIA TYPE: ICD-10-CM

## 2020-09-14 DIAGNOSIS — N18.2 CKD (CHRONIC KIDNEY DISEASE) STAGE 2, GFR 60-89 ML/MIN: Chronic | ICD-10-CM

## 2020-09-14 DIAGNOSIS — I10 ESSENTIAL HYPERTENSION: ICD-10-CM

## 2020-09-14 DIAGNOSIS — I10 HYPERTENSION, ESSENTIAL: ICD-10-CM

## 2020-09-14 PROCEDURE — 3008F PR BODY MASS INDEX (BMI) DOCUMENTED: ICD-10-PCS | Mod: CPTII,S$GLB,, | Performed by: PHYSICIAN ASSISTANT

## 2020-09-14 PROCEDURE — 3079F DIAST BP 80-89 MM HG: CPT | Mod: CPTII,S$GLB,, | Performed by: PHYSICIAN ASSISTANT

## 2020-09-14 PROCEDURE — 3079F PR MOST RECENT DIASTOLIC BLOOD PRESSURE 80-89 MM HG: ICD-10-PCS | Mod: CPTII,S$GLB,, | Performed by: PHYSICIAN ASSISTANT

## 2020-09-14 PROCEDURE — 99999 PR PBB SHADOW E&M-EST. PATIENT-LVL V: ICD-10-PCS | Mod: PBBFAC,,, | Performed by: PHYSICIAN ASSISTANT

## 2020-09-14 PROCEDURE — 99214 PR OFFICE/OUTPT VISIT, EST, LEVL IV, 30-39 MIN: ICD-10-PCS | Mod: S$GLB,,, | Performed by: PHYSICIAN ASSISTANT

## 2020-09-14 PROCEDURE — 3075F PR MOST RECENT SYSTOLIC BLOOD PRESS GE 130-139MM HG: ICD-10-PCS | Mod: CPTII,S$GLB,, | Performed by: PHYSICIAN ASSISTANT

## 2020-09-14 PROCEDURE — 3075F SYST BP GE 130 - 139MM HG: CPT | Mod: CPTII,S$GLB,, | Performed by: PHYSICIAN ASSISTANT

## 2020-09-14 PROCEDURE — 99999 PR PBB SHADOW E&M-EST. PATIENT-LVL V: CPT | Mod: PBBFAC,,, | Performed by: PHYSICIAN ASSISTANT

## 2020-09-14 PROCEDURE — 3008F BODY MASS INDEX DOCD: CPT | Mod: CPTII,S$GLB,, | Performed by: PHYSICIAN ASSISTANT

## 2020-09-14 PROCEDURE — 99214 OFFICE O/P EST MOD 30 MIN: CPT | Mod: S$GLB,,, | Performed by: PHYSICIAN ASSISTANT

## 2020-09-14 NOTE — PROGRESS NOTES
Subjective:       Patient ID: Ravi Zamorano is a 64 y.o. male.        Chief Complaint: Follow-up    Ravi Zamorano is an established patient of Mima Mack MD here today for f/u BP    HTN - valsartan increased from 160 --> 320 mg 7/2020 by Dr. Mack due to uncontrolled readings, continued on nifedipine XL 30 mg daily and carvedilol 25 mg BID, home BP readings 120-130/60-70's so much improved  BP Readings from Last 5 Encounters:  09/14/20 : 136/84  09/10/20 : (!) 142/67  11/11/19 : (!) 146/83  10/31/19 : (!) 148/82  10/25/19 : 130/80    DM - taking tresiba 34 U daily and humalog with meals  Lab Results       Component                Value               Date                       HGBA1C                   7.1 (H)             09/11/2020                 HGBA1C                   7.1 (H)             01/20/2020                 HGBA1C                   7.5 (H)             10/23/2019              S/p kidney transplant 11/5/16 secondary to ESRD from HTN and DM - due for f/u with transplant 11/2020    Afib s/p cardioverson 8/2019 - taking eliquis 5 mg and flecainide 100 mg, no recurrence of afib    CKD - due for f/u with nephrology    S/p flu shot         Review of Systems   Constitutional: Negative for appetite change, chills, fatigue and fever.   HENT: Negative for congestion and sore throat.    Eyes: Negative for visual disturbance.   Respiratory: Negative for cough, chest tightness and shortness of breath.    Cardiovascular: Negative for chest pain, palpitations and leg swelling.   Gastrointestinal: Negative for abdominal pain, blood in stool, constipation, diarrhea, nausea and vomiting.   Genitourinary: Negative for dysuria, frequency, hematuria and urgency.   Musculoskeletal: Negative for arthralgias and back pain.   Skin: Negative for rash.   Neurological: Negative for dizziness, syncope, weakness and headaches.   Psychiatric/Behavioral: Negative for dysphoric mood and sleep disturbance. The patient is  not nervous/anxious.        Objective:      Physical Exam  Vitals signs and nursing note reviewed.   Constitutional:       Appearance: He is well-developed.   HENT:      Head: Normocephalic.      Right Ear: External ear normal.      Left Ear: External ear normal.   Eyes:      Pupils: Pupils are equal, round, and reactive to light.   Cardiovascular:      Rate and Rhythm: Normal rate and regular rhythm.      Heart sounds: Normal heart sounds. No murmur. No friction rub. No gallop.    Pulmonary:      Effort: Pulmonary effort is normal. No respiratory distress.      Breath sounds: Normal breath sounds.   Abdominal:      Palpations: Abdomen is soft.      Tenderness: There is no abdominal tenderness.   Skin:     General: Skin is warm and dry.   Neurological:      Mental Status: He is alert.         Assessment:       1. Encounter for screening colonoscopy    2. Hypertension, essential    3. Essential hypertension    4. Hyperlipidemia, unspecified hyperlipidemia type    5. History of cardioversion    6. Atrial fibrillation, unspecified type    7. CKD (chronic kidney disease) stage 2, GFR 60-89 ml/min    8. History of hepatitis C, s/p successful Rx w/ SVR24 - 9/2017    9. S/P cadaveric kidney transplant 11/5/2016. ESRD secondary to HTN/DMII        Plan:       Ravi was seen today for follow-up.    Diagnoses and all orders for this visit:    Encounter for screening colonoscopy  -     Case request GI: COLONOSCOPY    Hypertension, essential - stable and controlled    Hyperlipidemia, unspecified hyperlipidemia type - taking lipitor 40 mg, lipid panel due 10/2020 and already ordered by Dr. Mack    History of cardioversion  Atrial fibrillation, unspecified type       -     Followed by cardiology    CKD (chronic kidney disease) stage 2, GFR 60-89 ml/min - due for f/u with nephrology, will assist in scheduling    History of hepatitis C, s/p successful Rx w/ SVR24 - 9/2017    S/P cadaveric kidney transplant 11/5/2016. ESRD  "secondary to HTN/DMII - due for f/u with transplant 11/2020        Pt has been given instructions populated from [employer] database and has verbalized understanding of the after visit summary and information contained wherein.    Follow up with a primary care provider. May go to ER for acute shortness of breath, lightheadedness, fever, or any other emergent complaints or changes in condition.    "This note will be shared with the patient"    No future appointments.              "

## 2020-09-15 DIAGNOSIS — I10 ESSENTIAL HYPERTENSION: Primary | ICD-10-CM

## 2020-09-15 NOTE — TELEPHONE ENCOUNTER
No new care gaps identified.  Powered by [employer]. Reference number: [id number]. 9/15/2020 12:44:25 PM   CDT

## 2020-09-16 RX ORDER — NIFEDIPINE 30 MG/1
30 TABLET, EXTENDED RELEASE ORAL DAILY
Qty: 90 TABLET | Refills: 3 | Status: SHIPPED | OUTPATIENT
Start: 2020-09-16 | End: 2021-01-26

## 2020-09-16 NOTE — PROGRESS NOTES
Refill Authorization Note   Ravi Zamorano is requesting a refill authorization.     Brief assessment and rationale for refill: APPROVE: prr          Medication Therapy Plan: CDMR: Elevated BP at transplant/cards ov; approve 12 more    Medication reconciliation completed: No                    Comments:      Orders Placed This Encounter    NIFEdipine (PROCARDIA-XL) 30 MG (OSM) 24 hr tablet      Requested Prescriptions   Signed Prescriptions Disp Refills    NIFEdipine (PROCARDIA-XL) 30 MG (OSM) 24 hr tablet 90 tablet 3     Sig: Take 1 tablet (30 mg total) by mouth once daily.       Cardiovascular:  Calcium Channel Blockers Passed - 9/15/2020  2:43 PM        Passed - Patient is at least 18 years old        Passed - Last BP in normal range within 360 days.     BP Readings from Last 3 Encounters:   09/14/20 136/84   09/10/20 (!) 142/67   11/11/19 (!) 146/83              Passed - Office visit in past 12 months or future 90 days.     Recent Outpatient Visits            2 days ago Encounter for screening colonoscopy    [facility] Randi Mejia PA-C    6 days ago Persistent atrial fibrillation    [id number] Mima Lyons NP    2 months ago Essential hypertension    [facility] Mima Mack MD    9 months ago Eye exam, routine    [facility] - Optometry Gabriel Radford OD    10 months ago S/P cadaveric kidney transplant 11/5/2016. ESRD secondary to HTN/DMII    [facility]- Transplant 1st Fl Sylvie Frias MD                        Appointments  past 12m or future 3m with PCP    Date Provider   Last Visit   7/11/2020 Mima Mack MD   Next Visit   Visit date not found Mima Mack MD   ED visits in past 90 days: 0     Note composed:4:30 PM 09/16/2020

## 2020-09-19 NOTE — TELEPHONE ENCOUNTER
----- Message from Aster Kolb sent at 4/13/2017  8:57 AM CDT -----  Contact: Self [phone]  Pt needs a prescription refill for insulin detemir (LEVEMIR FLEXTOUCH) 100 unit/mL (3 mL) SubQ InPn pen    [facility] - [place], [place] - [address]   [phone] (Phone)  [phone] (Fax)       Ranjith Buchanan)

## 2020-09-28 RX ORDER — FLECAINIDE ACETATE 100 MG/1
100 TABLET ORAL EVERY 12 HOURS
Qty: 60 TABLET | Refills: 0 | Status: SHIPPED | OUTPATIENT
Start: 2020-09-28 | End: 2020-10-27 | Stop reason: SDUPTHER

## 2020-09-28 RX ORDER — INSULIN GLARGINE 100 [IU]/ML
INJECTION, SOLUTION SUBCUTANEOUS
Qty: 12 ML | Refills: 6 | Status: SHIPPED | OUTPATIENT
Start: 2020-09-28 | End: 2021-12-14 | Stop reason: SDUPTHER

## 2020-10-09 ENCOUNTER — TELEPHONE (OUTPATIENT)
Dept: ENDOSCOPY | Facility: HOSPITAL | Age: 65
End: 2020-10-09

## 2020-10-09 DIAGNOSIS — Z94.0 KIDNEY REPLACED BY TRANSPLANT: Primary | ICD-10-CM

## 2020-10-09 NOTE — TELEPHONE ENCOUNTER
Please call to confirm that pt is on eliquis only for stroke prevention due to afib. If so, then ok to hold for Cscope. Let Ms. Mindy Seipel know.

## 2020-10-09 NOTE — TELEPHONE ENCOUNTER
Pt needs to be scheduled for a colonoscopy. Pt is currently taking Eliquis.  Per endoscopy protocol it should be held x 2 days prior.  Ok to hold?  Please advise.  Thanks

## 2020-10-09 NOTE — TELEPHONE ENCOUNTER
Called endoscopy and made aware, also document here that, to confirm per Dr. Valadez, patient is on eliquis only for stroke prevention due to afib. If so, then it is ok to hold for colonoscopy.

## 2020-10-14 ENCOUNTER — PATIENT OUTREACH (OUTPATIENT)
Dept: ADMINISTRATIVE | Facility: OTHER | Age: 65
End: 2020-10-14

## 2020-10-14 NOTE — PROGRESS NOTES
LINKS immunization registry updated  Care Everywhere updated  Health Maintenance updated  Chart reviewed for overdue Proactive [facility] ([facility]) health maintenance testing (CRS, Breast Ca, Diabetic Eye Exam)   Orders entered:N/A  Patient has open case request for colonoscopy.

## 2020-10-15 ENCOUNTER — OFFICE VISIT (OUTPATIENT)
Dept: UROLOGY | Facility: CLINIC | Age: 65
End: 2020-10-15
Payer: COMMERCIAL

## 2020-10-15 VITALS
WEIGHT: 207.31 LBS | HEIGHT: 73 IN | HEART RATE: 61 BPM | DIASTOLIC BLOOD PRESSURE: 73 MMHG | SYSTOLIC BLOOD PRESSURE: 144 MMHG | BODY MASS INDEX: 27.47 KG/M2

## 2020-10-15 DIAGNOSIS — N52.1 TYPE 2 DIABETES MELLITUS WITH CIRCULATORY DISORDER CAUSING ERECTILE DYSFUNCTION: ICD-10-CM

## 2020-10-15 DIAGNOSIS — R33.9 INCOMPLETE EMPTYING OF BLADDER: ICD-10-CM

## 2020-10-15 DIAGNOSIS — N40.1 BPH WITH URINARY OBSTRUCTION: Primary | ICD-10-CM

## 2020-10-15 DIAGNOSIS — N13.8 BPH WITH URINARY OBSTRUCTION: Primary | ICD-10-CM

## 2020-10-15 DIAGNOSIS — Z94.0 S/P KIDNEY TRANSPLANT: ICD-10-CM

## 2020-10-15 DIAGNOSIS — R35.0 URINARY FREQUENCY: ICD-10-CM

## 2020-10-15 DIAGNOSIS — N48.6 PEYRONIE'S SYNDROME: ICD-10-CM

## 2020-10-15 DIAGNOSIS — E11.59 TYPE 2 DIABETES MELLITUS WITH CIRCULATORY DISORDER CAUSING ERECTILE DYSFUNCTION: ICD-10-CM

## 2020-10-15 LAB
BILIRUB SERPL-MCNC: ABNORMAL MG/DL
BLOOD URINE, POC: ABNORMAL
CLARITY, POC UA: CLEAR
COLOR, POC UA: YELLOW
GLUCOSE UR QL STRIP: 100
KETONES UR QL STRIP: ABNORMAL
LEUKOCYTE ESTERASE URINE, POC: ABNORMAL
NITRITE, POC UA: ABNORMAL
PH, POC UA: 6
POC RESIDUAL URINE VOLUME: 129 ML (ref 0–100)
PROTEIN, POC: 100
SPECIFIC GRAVITY, POC UA: 1.02
UROBILINOGEN, POC UA: NORMAL

## 2020-10-15 PROCEDURE — 3051F HG A1C>EQUAL 7.0%<8.0%: CPT | Mod: CPTII,S$GLB,, | Performed by: NURSE PRACTITIONER

## 2020-10-15 PROCEDURE — 99214 OFFICE O/P EST MOD 30 MIN: CPT | Mod: 25,S$GLB,, | Performed by: NURSE PRACTITIONER

## 2020-10-15 PROCEDURE — 99999 PR PBB SHADOW E&M-EST. PATIENT-LVL V: CPT | Mod: PBBFAC,,, | Performed by: NURSE PRACTITIONER

## 2020-10-15 PROCEDURE — 99999 PR PBB SHADOW E&M-EST. PATIENT-LVL V: ICD-10-PCS | Mod: PBBFAC,,, | Performed by: NURSE PRACTITIONER

## 2020-10-15 PROCEDURE — 81002 URINALYSIS NONAUTO W/O SCOPE: CPT | Mod: S$GLB,,, | Performed by: NURSE PRACTITIONER

## 2020-10-15 PROCEDURE — 3077F PR MOST RECENT SYSTOLIC BLOOD PRESSURE >= 140 MM HG: ICD-10-PCS | Mod: CPTII,S$GLB,, | Performed by: NURSE PRACTITIONER

## 2020-10-15 PROCEDURE — 3078F DIAST BP <80 MM HG: CPT | Mod: CPTII,S$GLB,, | Performed by: NURSE PRACTITIONER

## 2020-10-15 PROCEDURE — 3077F SYST BP >= 140 MM HG: CPT | Mod: CPTII,S$GLB,, | Performed by: NURSE PRACTITIONER

## 2020-10-15 PROCEDURE — 3078F PR MOST RECENT DIASTOLIC BLOOD PRESSURE < 80 MM HG: ICD-10-PCS | Mod: CPTII,S$GLB,, | Performed by: NURSE PRACTITIONER

## 2020-10-15 PROCEDURE — 99214 PR OFFICE/OUTPT VISIT, EST, LEVL IV, 30-39 MIN: ICD-10-PCS | Mod: 25,S$GLB,, | Performed by: NURSE PRACTITIONER

## 2020-10-15 PROCEDURE — 3051F PR MOST RECENT HEMOGLOBIN A1C LEVEL 7.0 - < 8.0%: ICD-10-PCS | Mod: CPTII,S$GLB,, | Performed by: NURSE PRACTITIONER

## 2020-10-15 PROCEDURE — 51798 POCT BLADDER SCAN: ICD-10-PCS | Mod: S$GLB,,, | Performed by: NURSE PRACTITIONER

## 2020-10-15 PROCEDURE — 51798 US URINE CAPACITY MEASURE: CPT | Mod: S$GLB,,, | Performed by: NURSE PRACTITIONER

## 2020-10-15 PROCEDURE — 3008F PR BODY MASS INDEX (BMI) DOCUMENTED: ICD-10-PCS | Mod: CPTII,S$GLB,, | Performed by: NURSE PRACTITIONER

## 2020-10-15 PROCEDURE — 3008F BODY MASS INDEX DOCD: CPT | Mod: CPTII,S$GLB,, | Performed by: NURSE PRACTITIONER

## 2020-10-15 PROCEDURE — 81002 POCT URINE DIPSTICK WITHOUT MICROSCOPE: ICD-10-PCS | Mod: S$GLB,,, | Performed by: NURSE PRACTITIONER

## 2020-10-15 RX ORDER — TAMSULOSIN HYDROCHLORIDE 0.4 MG/1
0.4 CAPSULE ORAL NIGHTLY
Qty: 90 CAPSULE | Refills: 3 | Status: SHIPPED | OUTPATIENT
Start: 2020-10-15 | End: 2020-12-02

## 2020-10-15 NOTE — PATIENT INSTRUCTIONS
What Is Peyronie Disease?    A slight natural curve to the erect penis is usually normal. But curvature that causes pain and difficulty with intercourse is a problem. The development of painful curvature is called Peyronie disease. Peyronie disease is due to scar tissue (plaque) that forms inside the penis.  Your penile anatomy  The shaft (body) of the penis consists of the corpora cavernosa, 2 columns of spongy tissue. During an erection, this tissue fills with blood, swells, and becomes rigid, creating an erection. A dense sheath of elastic tissue called the tunica surrounds the corpora. This tissue stretches as the penis becomes erect.  Painful curvature  Peyronie disease occurs when a plaque (scar) develops on the fibrous sheath of tissue surrounding the corpora. The scar can form on any part of the penis, but often is found on the top or bottom. The scarred area of the tunica loses its elasticity, so it doesnt stretch when the corpora swells. Because the tunica doesnt stretch in that area, the erect penis curves in the direction of the scar.  Possible causes  No one is sure just what causes the plaque. It may be the result of an injury to the erect penis or a blow to the groin. The plaque may occur because of a problem with your immune system. One thing is certain, however, that Peyronie disease is not caused by sexually transmitted diseases and is not cancer.   Symptoms of peyronie disease  · Curvature of the penis during erection (which may interfere with intercourse)  · Pain during erection  · Soft erections  · Shortening or narrowing of the penis  A hard area usually felt below the skin of the penis in the area of the plaque.  Date Last Reviewed: 1/1/2017  © 5340-9357 [employer]. [address] [place] [address], [place] [address]. All rights reserved. This information is not intended as a substitute for professional medical care. Always follow your healthcare professional's  instructions.        Treating Peyronie Disease    Peyronie disease occurs when the penis curves during an erection. This is most often due to a plaque (scar) that forms inside the penis. In some men, the plaque shrinks and disappears on its own, without treatment. If treatment is needed, the main goal is to relieve pain and make the penis straight enough for sex. Pain is caused by an erection and subsequent bending of the penis. Peyronie disease is not contagious or caused by any transmittable disease. There are different kinds of treatment. The success of these different treatments varies from man to man.  Medicine  Medicine is usually tried for 1 to 2 years before other treatments are done. For some men, the disease will go away during this time. Medicine may help reduce pain. It may also help soften and shrink the plaque in the penis. Some medicines may be taken by mouth. And some may be rubbed right on the penis. Others may be injected into the plaque. Medicines that treat erectile dysfunction may help with some of the problems of Peyronie disease. But they will not treat the curvature or pain. Your doctor will discuss all your options and possible side effects with you.  Additional treatment options may include ultrasound therapy, radiation therapy, shockwave therapy, or iontophoresis which is the delivery of a cream through the skin by using low level electrical current.  Surgery  Surgery is used in cases that cant be treated by other means. It may also be done for a severe curve in the penis. It may also be done for severe pain that does not stop. Options for surgery include:  · An incision in the plaque to release tension. Part of the plaque is removed and replaced with a graft.  · Making the penis shorter. This is done on the opposite side of the plaque. It can cancel out the curve.  · Implanting of a device (prosthesis). This can straighten the penis and make it rigid enough for sex.  Your doctor can  discuss the risks and benefits of these treatments with you. Be sure to ask questions. Consider all of your options before you choose surgery.  Peyronie disease is hard to cure. Counseling may help you cope with the effects of the disease. It may help you and your partner find ways to deal with it.   Date Last Reviewed: 12/1/2016  © 2523-8931 [employer]. [address] [place] [address], [place] [address]. All rights reserved. This information is not intended as a substitute for professional medical care. Always follow your healthcare professional's instructions.

## 2020-10-15 NOTE — PROGRESS NOTES
CHIEF COMPLAINT:    Ravi Zamorano is a 64 y.o. male presents today for f/u visit.    HISTORY OF PRESENTING ILLINESS:    Ravi Zamorano is a 64 y.o. male with T2 Diabetes. He is kidney transplant patient from 11/2016.   He was last seen in our clinic 04/09/2018 for BPH and ED.   He had been taking Flomax Rx by Dr. Mckeon 02/14/2017    He is her today to discuss Peyronie's disease.  States penis makes a sharp curve to the left.  This has been going on > 1 year.  No painful but unable to penetrate. Has some issues with ED so interested in the penile implant.  States the penile implant better for him.           REVIEW OF SYSTEMS:  Review of Systems   Constitutional: Negative.  Negative for chills, diaphoresis and fever.   HENT: Negative for congestion and sore throat.    Eyes: Negative.  Negative for double vision.   Respiratory: Negative.  Negative for cough, shortness of breath and wheezing.    Cardiovascular: Negative.  Negative for chest pain, palpitations and leg swelling.   Gastrointestinal: Negative.  Negative for abdominal pain, blood in stool, constipation, diarrhea, nausea and vomiting.   Genitourinary: Positive for frequency and urgency. Negative for dysuria, flank pain and hematuria.        FOS is good  No straining  Nocturia 3x     Musculoskeletal: Negative.  Negative for back pain, falls and neck pain.   Skin: Negative.  Negative for rash.   Neurological: Negative.  Negative for dizziness and seizures.   Endo/Heme/Allergies: Does not bruise/bleed easily.   Psychiatric/Behavioral: Negative.  Negative for depression. The patient is not nervous/anxious.          PATIENT HISTORY:    Past Medical History:   Diagnosis Date    Anxiety 7/29/2014    Arthritis     Bilateral diabetic retinopathy 2017    CKD (chronic kidney disease) stage 2, GFR 60-89 ml/min 12/28/2016    CKD (chronic kidney disease) stage 4, GFR 15-29 ml/min 7/29/2014    Colon polyps 2014    Depression - situational 7/29/2014     Diabetes mellitus     Diabetes type 2 since 2000 7/29/2014    Diabetic neuropathy 7/29/2014    History of hepatitis C, s/p successful Rx w/ SVR24 - 9/2017 7/29/2014    Genotype 1a, treatment naive 10/2014 liver biopsy - grade 1 / stage 1 Completed Harvoni w/ SVR    Hyperlipidemia 7/29/2014    Hypertension     Neuropathy     Organ transplant candidate 7/29/2014    Pancreatitis        Past Surgical History:   Procedure Laterality Date    APPENDECTOMY      KIDNEY TRANSPLANT      TRANSESOPHAGEAL ECHOCARDIOGRAPHY N/A 8/26/2019    Procedure: ECHOCARDIOGRAM, TRANSESOPHAGEAL;  Surgeon: Dolores Diagnostic Provider;  Location: [facility] EP LAB;  Service: Cardiology;  Laterality: N/A;    TREATMENT OF CARDIAC ARRHYTHMIA N/A 8/26/2019    Procedure: CARDIOVERSION;  Surgeon: Bronson Bowden MD;  Location: [facility] EP LAB;  Service: Cardiology;  Laterality: N/A;  AF, FELICIANO, DCCV, MAC, GP, 3 PREP       Family History   Problem Relation Age of Onset    Diabetes Mother     Hypertension Mother     Heart disease Mother         CAD with PCI    Heart disease Father     Cancer Brother         one sister with breast cancer    Hypertension Brother         one sister with HTN and borderline DM    Stroke Neg Hx     Kidney disease Neg Hx        Social History     Socioeconomic History    Marital status:      Spouse name: Not on file    Number of children: Not on file    Years of education: Not on file    Highest education level: Not on file   Occupational History     Employer: [employer]   Social Needs    Financial resource strain: Not on file    Food insecurity     Worry: Not on file     Inability: Not on file    Transportation needs     Medical: Not on file     Non-medical: Not on file   Tobacco Use    Smoking status: Former Smoker     Packs/day: 0.50     Years: 32.00     Pack years: 16.00     Types: Cigarettes     Quit date: 11/28/2016     Years since quitting: 3.8    Smokeless tobacco: Never Used    Tobacco  comment: Pt reports that he quit in 2013, but started up again in 2015. pt reports he is currently working on quitting again   Substance and Sexual Activity    Alcohol use: Yes     Comment: Pt reports occasional beers on Sundays. Pt reports drinking daily prior to ESRD diagnosis.    Drug use: No    Sexual activity: Yes     Partners: Female   Lifestyle    Physical activity     Days per week: Not on file     Minutes per session: Not on file    Stress: Not on file   Relationships    Social connections     Talks on phone: Not on file     Gets together: Not on file     Attends Jew service: Not on file     Active member of club or organization: Not on file     Attends meetings of clubs or organizations: Not on file     Relationship status: Not on file   Other Topics Concern    Not on file   Social History Narrative     for 14 years     for 29 years    2 children ages 35, 36       Allergies:  Patient has no known allergies.    Medications:    Current Outpatient Medications:     apixaban (ELIQUIS) 5 mg Tab, Take 1 tablet (5 mg total) by mouth 2 (two) times daily., Disp: 60 tablet, Rfl: 11    aspirin 81 MG Chew, Take 81 mg by mouth once daily., Disp: , Rfl:     atorvastatin (LIPITOR) 40 MG tablet, Take 1 tablet (40 mg total) by mouth once daily., Disp: 90 tablet, Rfl: 0    blood sugar diagnostic Strp, To check BG 4 times daily, to use with insurance preferred meter-one touch, Disp: 400 each, Rfl: 3    blood-glucose meter kit, To check BG 4 times daily, to use with insurance preferred meter-one touch, Disp: 1 each, Rfl: 0    carvediloL (COREG) 25 MG tablet, Take 1 tablet (25 mg total) by mouth 2 (two) times daily., Disp: 180 tablet, Rfl: 3    famotidine (PEPCID) 20 MG tablet, Take 1 tablet (20 mg total) by mouth every evening., Disp: 90 tablet, Rfl: 3    flecainide (TAMBOCOR) 100 MG Tab, Take 1 tablet (100 mg total) by mouth every 12 (twelve) hours., Disp: 60 tablet, Rfl: 0     "gabapentin (NEURONTIN) 300 MG capsule, Take 1 tablet in the morning; 1 tablet midday, and 3 tablets at night., Disp: 450 capsule, Rfl: 3    insulin (LANTUS SOLOSTAR U-100 INSULIN) glargine 100 units/mL (3mL) SubQ pen, Inject 35 units at night, Disp: 12 mL, Rfl: 6    insulin detemir U-100 (LEVEMIR FLEXTOUCH U-100 INSULN) 100 unit/mL (3 mL) SubQ InPn pen, Inject 35 units at night. Voucher placed with rx via [employer]. (Patient not taking: Reported on 9/14/2020), Disp: 30 mL, Rfl: 1    insulin lispro (HUMALOG KWIKPEN INSULIN) 100 unit/mL pen, Inject 18 units w/ breakfast, 16 units w/ lunch and dinner plus scale 150-200 +2, 201-250 +4, 251-300 +6, 301-350 +8., Disp: 30 mL, Rfl: 4    lancets Misc, To check BG 4 times daily, to use with insurance preferred meter-one touch, Disp: 400 each, Rfl: 3    meclizine (ANTIVERT) 25 mg tablet, Take 1 tablet (25 mg total) by mouth 3 (three) times daily as needed for Dizziness., Disp: 21 tablet, Rfl: 0    melatonin 5 mg Tab, Take 1 tablet (5 mg total) by mouth every evening., Disp: , Rfl:     mycophenolate (CELLCEPT) 250 mg Cap, Take 4 capsules (1,000 mg total) by mouth 2 (two) times daily., Disp: 240 capsule, Rfl: 11    NIFEdipine (PROCARDIA-XL) 30 MG (OSM) 24 hr tablet, Take 1 tablet (30 mg total) by mouth once daily., Disp: 90 tablet, Rfl: 3    pen needle, diabetic 32 gauge x 5/32" Ndle, USE TO ADMINISTER INSULIN 4 TIMES DAILY., Disp: 400 each, Rfl: 3    predniSONE (DELTASONE) 5 MG tablet, Take 1 tablet (5 mg total) by mouth once daily., Disp: 30 tablet, Rfl: 11    tacrolimus (PROGRAF) 0.5 MG Cap, Take 2 capsules (1 mg total) by mouth every morning AND 1 capsule (0.5 mg total) every evening., Disp: 90 capsule, Rfl: 11    tamsulosin (FLOMAX) 0.4 mg Cap, Take 1 capsule (0.4 mg total) by mouth every evening., Disp: 90 capsule, Rfl: 3    valsartan (DIOVAN) 320 MG tablet, Take 1 tablet (320 mg total) by mouth once daily., Disp: 90 tablet, Rfl: 3    PHYSICAL " EXAMINATION:  Physical Exam  Vitals signs and nursing note reviewed.   Constitutional:       General: He is awake.      Appearance: Normal appearance. He is normal weight.   HENT:      Head: Normocephalic.      Right Ear: External ear normal.      Left Ear: External ear normal.      Nose: Nose normal.   Eyes:      Conjunctiva/sclera: Conjunctivae normal.   Neck:      Musculoskeletal: Normal range of motion.   Cardiovascular:      Rate and Rhythm: Normal rate.   Pulmonary:      Effort: Pulmonary effort is normal. No respiratory distress.   Abdominal:      General: There is no distension.      Tenderness: There is no abdominal tenderness. There is no right CVA tenderness, left CVA tenderness or rebound.   Genitourinary:     Penis: Normal and circumcised. No hypospadias, erythema, tenderness, discharge or lesions.       Scrotum/Testes: Normal.         Right: Mass, tenderness or swelling not present.         Left: Mass, tenderness or swelling not present.      Rectum: Normal.      Comments: He refused complete LEAH;   Musculoskeletal: Normal range of motion.   Lymphadenopathy:      Lower Body: No right inguinal adenopathy. No left inguinal adenopathy.   Skin:     General: Skin is warm and dry.   Neurological:      General: No focal deficit present.      Mental Status: He is alert and oriented to person, place, and time.   Psychiatric:         Mood and Affect: Mood normal.         Behavior: Behavior is cooperative.           LABS:    Hgb A1c 09/11/2020 was 7.1    In office UA today was clear of infection (+) glucose.  PVR done in the office today by the nurse was 129.        Lab Results   Component Value Date    PSA 2.0 10/24/2016    PSA 1.4 07/29/2014    PSADIAG 1.7 04/09/2018             IMPRESSION:    Encounter Diagnoses   Name Primary?    BPH with urinary obstruction Yes    S/P kidney transplant     Urinary frequency     Type 2 diabetes mellitus with circulatory disorder causing erectile dysfunction      Peyronie's syndrome     Incomplete emptying of bladder          Assessment:       1. BPH with urinary obstruction    2. S/P kidney transplant    3. Urinary frequency    4. Type 2 diabetes mellitus with circulatory disorder causing erectile dysfunction    5. Peyronie's syndrome    6. Incomplete emptying of bladder        Plan:         I spent 30 minutes with the patient of which more than half was spent in direct consultation with the patient in regards to our treatment and plan.    Education and recommendations of today's plan of care including home remedies.  We discussed his LUTS and contributory factors.  Restart the flomax to help empty bladder and reduce nocturia  We discussed his ED and contributory factors.  Discussed his Peyronie's and treatment.    We discussed the IPP benefits and expectations,.  Risk for infection  Emphasized he needs to lower his Hgb A1c/better DM control before surgical candidate.   Also if he not surgical candidate for IPP there are other treatments for PD  Update PSA.

## 2020-10-27 DIAGNOSIS — E78.5 HYPERLIPIDEMIA, UNSPECIFIED HYPERLIPIDEMIA TYPE: ICD-10-CM

## 2020-10-27 RX ORDER — FLECAINIDE ACETATE 100 MG/1
100 TABLET ORAL EVERY 12 HOURS
Qty: 60 TABLET | Refills: 0 | Status: SHIPPED | OUTPATIENT
Start: 2020-10-27 | End: 2020-11-30 | Stop reason: SDUPTHER

## 2020-10-27 RX ORDER — ATORVASTATIN CALCIUM 40 MG/1
40 TABLET, FILM COATED ORAL DAILY
Qty: 90 TABLET | Refills: 0 | Status: SHIPPED | OUTPATIENT
Start: 2020-10-27 | End: 2021-01-26 | Stop reason: SDUPTHER

## 2020-10-27 NOTE — TELEPHONE ENCOUNTER
Care Due:                  Date            Visit Type   Department     Provider  --------------------------------------------------------------------------------                                             [facility] INTER[facility]  Last Visit: 07-      [facility]       CARRIE BUSTILLOS                                           [facility] INTER[facility]  Next Visit: 02-      [facility]       CARRIE BUSTILLOS                                                            Last  Test          Frequency    Reason                     Performed    Due Date  --------------------------------------------------------------------------------    HDL.........  12 months..  atorvastatin.............  10-   10-    LDL.........  12 months..  atorvastatin.............  10-   10-    Total         12 months..  atorvastatin.............  10-   10-  Cholesterol.    Triglyceride  12 months..  atorvastatin.............  10-   10-  s...........    Powered by [employer]. Reference number: [id number]. 10/27/2020 11:06:05 AM   CDT

## 2020-11-10 ENCOUNTER — LAB VISIT (OUTPATIENT)
Dept: LAB | Facility: HOSPITAL | Age: 65
End: 2020-11-10
Attending: INTERNAL MEDICINE
Payer: COMMERCIAL

## 2020-11-10 DIAGNOSIS — N48.6 PEYRONIE DISEASE: Primary | ICD-10-CM

## 2020-11-10 DIAGNOSIS — R35.0 URINARY FREQUENCY: ICD-10-CM

## 2020-11-10 DIAGNOSIS — E11.59 TYPE 2 DIABETES MELLITUS WITH CIRCULATORY DISORDER CAUSING ERECTILE DYSFUNCTION: ICD-10-CM

## 2020-11-10 DIAGNOSIS — N40.1 BPH WITH URINARY OBSTRUCTION: ICD-10-CM

## 2020-11-10 DIAGNOSIS — Z94.0 S/P KIDNEY TRANSPLANT: ICD-10-CM

## 2020-11-10 DIAGNOSIS — E11.3393 TYPE 2 DIABETES MELLITUS WITH BOTH EYES AFFECTED BY MODERATE NONPROLIFERATIVE RETINOPATHY WITHOUT MACULAR EDEMA, WITHOUT LONG-TERM CURRENT USE OF INSULIN: ICD-10-CM

## 2020-11-10 DIAGNOSIS — N13.8 BPH WITH URINARY OBSTRUCTION: ICD-10-CM

## 2020-11-10 DIAGNOSIS — N52.1 TYPE 2 DIABETES MELLITUS WITH CIRCULATORY DISORDER CAUSING ERECTILE DYSFUNCTION: ICD-10-CM

## 2020-11-10 DIAGNOSIS — Z94.0 KIDNEY REPLACED BY TRANSPLANT: ICD-10-CM

## 2020-11-10 DIAGNOSIS — E78.5 HYPERLIPIDEMIA, UNSPECIFIED HYPERLIPIDEMIA TYPE: ICD-10-CM

## 2020-11-10 LAB
ALBUMIN SERPL BCP-MCNC: 3.2 G/DL (ref 3.5–5.2)
ALBUMIN SERPL BCP-MCNC: 3.2 G/DL (ref 3.5–5.2)
ALP SERPL-CCNC: 74 U/L (ref 55–135)
ALT SERPL W/O P-5'-P-CCNC: <5 U/L (ref 10–44)
ANION GAP SERPL CALC-SCNC: 8 MMOL/L (ref 8–16)
AST SERPL-CCNC: 13 U/L (ref 10–40)
BASOPHILS # BLD AUTO: 0.04 K/UL (ref 0–0.2)
BASOPHILS NFR BLD: 0.7 % (ref 0–1.9)
BILIRUB DIRECT SERPL-MCNC: 0.2 MG/DL (ref 0.1–0.3)
BILIRUB SERPL-MCNC: 0.4 MG/DL (ref 0.1–1)
BUN SERPL-MCNC: 11 MG/DL (ref 8–23)
CALCIUM SERPL-MCNC: 9 MG/DL (ref 8.7–10.5)
CHLORIDE SERPL-SCNC: 105 MMOL/L (ref 95–110)
CHOLEST SERPL-MCNC: 172 MG/DL (ref 120–199)
CHOLEST/HDLC SERPL: 3.6 {RATIO} (ref 2–5)
CO2 SERPL-SCNC: 30 MMOL/L (ref 23–29)
COMPLEXED PSA SERPL-MCNC: 2.2 NG/ML (ref 0–4)
CREAT SERPL-MCNC: 1.1 MG/DL (ref 0.5–1.4)
DIFFERENTIAL METHOD: ABNORMAL
EOSINOPHIL # BLD AUTO: 0.1 K/UL (ref 0–0.5)
EOSINOPHIL NFR BLD: 1.5 % (ref 0–8)
ERYTHROCYTE [DISTWIDTH] IN BLOOD BY AUTOMATED COUNT: 17.2 % (ref 11.5–14.5)
EST. GFR  (AFRICAN AMERICAN): >60 ML/MIN/1.73 M^2
EST. GFR  (NON AFRICAN AMERICAN): >60 ML/MIN/1.73 M^2
ESTIMATED AVG GLUCOSE: 171 MG/DL (ref 68–131)
GLUCOSE SERPL-MCNC: 174 MG/DL (ref 70–110)
HBA1C MFR BLD HPLC: 7.6 % (ref 4–5.6)
HCT VFR BLD AUTO: 41.8 % (ref 40–54)
HDLC SERPL-MCNC: 48 MG/DL (ref 40–75)
HDLC SERPL: 27.9 % (ref 20–50)
HGB BLD-MCNC: 12.4 G/DL (ref 14–18)
IMM GRANULOCYTES # BLD AUTO: 0.01 K/UL (ref 0–0.04)
IMM GRANULOCYTES NFR BLD AUTO: 0.2 % (ref 0–0.5)
LDLC SERPL CALC-MCNC: 99.8 MG/DL (ref 63–159)
LYMPHOCYTES # BLD AUTO: 1.5 K/UL (ref 1–4.8)
LYMPHOCYTES NFR BLD: 27.4 % (ref 18–48)
MAGNESIUM SERPL-MCNC: 1.5 MG/DL (ref 1.6–2.6)
MCH RBC QN AUTO: 24.4 PG (ref 27–31)
MCHC RBC AUTO-ENTMCNC: 29.7 G/DL (ref 32–36)
MCV RBC AUTO: 82 FL (ref 82–98)
MONOCYTES # BLD AUTO: 1.2 K/UL (ref 0.3–1)
MONOCYTES NFR BLD: 21 % (ref 4–15)
NEUTROPHILS # BLD AUTO: 2.7 K/UL (ref 1.8–7.7)
NEUTROPHILS NFR BLD: 49.2 % (ref 38–73)
NONHDLC SERPL-MCNC: 124 MG/DL
NRBC BLD-RTO: 0 /100 WBC
PHOSPHATE SERPL-MCNC: 3.2 MG/DL (ref 2.7–4.5)
PLATELET # BLD AUTO: 148 K/UL (ref 150–350)
PMV BLD AUTO: ABNORMAL FL (ref 9.2–12.9)
POTASSIUM SERPL-SCNC: 4.4 MMOL/L (ref 3.5–5.1)
PROT SERPL-MCNC: 6.8 G/DL (ref 6–8.4)
RBC # BLD AUTO: 5.09 M/UL (ref 4.6–6.2)
SODIUM SERPL-SCNC: 143 MMOL/L (ref 136–145)
TACROLIMUS BLD-MCNC: 6.2 NG/ML (ref 5–15)
TRIGL SERPL-MCNC: 121 MG/DL (ref 30–150)
WBC # BLD AUTO: 5.47 K/UL (ref 3.9–12.7)

## 2020-11-10 PROCEDURE — 87799 DETECT AGENT NOS DNA QUANT: CPT

## 2020-11-10 PROCEDURE — 83036 HEMOGLOBIN GLYCOSYLATED A1C: CPT

## 2020-11-10 PROCEDURE — 36415 COLL VENOUS BLD VENIPUNCTURE: CPT

## 2020-11-10 PROCEDURE — 85025 COMPLETE CBC W/AUTO DIFF WBC: CPT

## 2020-11-10 PROCEDURE — 83735 ASSAY OF MAGNESIUM: CPT

## 2020-11-10 PROCEDURE — 80197 ASSAY OF TACROLIMUS: CPT

## 2020-11-10 PROCEDURE — 80069 RENAL FUNCTION PANEL: CPT

## 2020-11-10 PROCEDURE — 84075 ASSAY ALKALINE PHOSPHATASE: CPT

## 2020-11-10 PROCEDURE — 84153 ASSAY OF PSA TOTAL: CPT

## 2020-11-10 PROCEDURE — 80061 LIPID PANEL: CPT

## 2020-11-13 ENCOUNTER — TELEPHONE (OUTPATIENT)
Dept: ENDOSCOPY | Facility: HOSPITAL | Age: 65
End: 2020-11-13

## 2020-11-13 DIAGNOSIS — Z12.11 COLON CANCER SCREENING: Primary | ICD-10-CM

## 2020-11-13 DIAGNOSIS — Z01.818 PRE-OP TESTING: ICD-10-CM

## 2020-11-13 RX ORDER — SODIUM, POTASSIUM,MAG SULFATES 17.5-3.13G
1 SOLUTION, RECONSTITUTED, ORAL ORAL DAILY
Qty: 1 KIT | Refills: 0 | Status: SHIPPED | OUTPATIENT
Start: 2020-11-13 | End: 2020-11-15

## 2020-11-13 NOTE — TELEPHONE ENCOUNTER
Contacted patient to schedule colonoscopy. Ok to hold Eliquis per Dr. Valadez. Patient states he does not have a ride and will call back to schedule.

## 2020-11-13 NOTE — TELEPHONE ENCOUNTER
Yaneth Moses LPN routed this conversation to Me    Yaneth Moses LPN         10/9/20 2:39 PM  Note     Called endoscopy and made aware, also document here that, to confirm per Dr. Valadez, patient is on eliquis only for stroke prevention due to afib. If so, then it is ok to hold for colonoscopy.               10/9/20 1:38 PM  Megan Valadez MD routed this conversation to Nurse Martina Valadez MD         10/9/20 1:38 PM  Note     Please call to confirm that pt is on eliquis only for stroke prevention due to afib. If so, then ok to hold for Cscope. Let Ms. Mindy Seipel know.               10/9/20 10:36 AM  You routed this conversation to Mima Mack MD    Me         10/9/20 10:36 AM  Note     Pt needs to be scheduled for a colonoscopy. Pt is currently taking Eliquis.  Per endoscopy protocol it should be held x 2 days prior.  Ok to hold?  Please advise.  Thanks

## 2020-11-16 ENCOUNTER — OFFICE VISIT (OUTPATIENT)
Dept: TRANSPLANT | Facility: CLINIC | Age: 65
End: 2020-11-16
Payer: COMMERCIAL

## 2020-11-16 VITALS
TEMPERATURE: 97 F | SYSTOLIC BLOOD PRESSURE: 144 MMHG | HEART RATE: 56 BPM | RESPIRATION RATE: 18 BRPM | OXYGEN SATURATION: 99 % | HEIGHT: 73 IN | BODY MASS INDEX: 27.96 KG/M2 | DIASTOLIC BLOOD PRESSURE: 68 MMHG | WEIGHT: 211 LBS

## 2020-11-16 DIAGNOSIS — N13.8 BPH WITH URINARY OBSTRUCTION: ICD-10-CM

## 2020-11-16 DIAGNOSIS — N25.81 HYPERPARATHYROIDISM DUE TO RENAL INSUFFICIENCY: ICD-10-CM

## 2020-11-16 DIAGNOSIS — R35.0 URINARY FREQUENCY: ICD-10-CM

## 2020-11-16 DIAGNOSIS — Z94.0 KIDNEY REPLACED BY TRANSPLANT: ICD-10-CM

## 2020-11-16 DIAGNOSIS — R33.9 INCOMPLETE EMPTYING OF BLADDER: ICD-10-CM

## 2020-11-16 DIAGNOSIS — N40.1 BPH WITH URINARY OBSTRUCTION: ICD-10-CM

## 2020-11-16 DIAGNOSIS — Z79.60 LONG-TERM USE OF IMMUNOSUPPRESSANT MEDICATION: ICD-10-CM

## 2020-11-16 DIAGNOSIS — E78.5 HYPERLIPIDEMIA, UNSPECIFIED HYPERLIPIDEMIA TYPE: ICD-10-CM

## 2020-11-16 DIAGNOSIS — N18.2 CKD (CHRONIC KIDNEY DISEASE) STAGE 2, GFR 60-89 ML/MIN: Chronic | ICD-10-CM

## 2020-11-16 DIAGNOSIS — I48.19 PERSISTENT ATRIAL FIBRILLATION: ICD-10-CM

## 2020-11-16 DIAGNOSIS — D63.8 ANEMIA OF CHRONIC DISEASE: ICD-10-CM

## 2020-11-16 DIAGNOSIS — E11.3393 TYPE 2 DIABETES MELLITUS WITH BOTH EYES AFFECTED BY MODERATE NONPROLIFERATIVE RETINOPATHY WITHOUT MACULAR EDEMA, WITHOUT LONG-TERM CURRENT USE OF INSULIN: ICD-10-CM

## 2020-11-16 DIAGNOSIS — Z94.0 S/P KIDNEY TRANSPLANT: Primary | ICD-10-CM

## 2020-11-16 PROCEDURE — 1126F PR PAIN SEVERITY QUANTIFIED, NO PAIN PRESENT: ICD-10-PCS | Mod: S$GLB,,, | Performed by: NURSE PRACTITIONER

## 2020-11-16 PROCEDURE — 3072F PR LOW RISK FOR RETINOPATHY: ICD-10-PCS | Mod: S$GLB,,, | Performed by: NURSE PRACTITIONER

## 2020-11-16 PROCEDURE — 1126F AMNT PAIN NOTED NONE PRSNT: CPT | Mod: S$GLB,,, | Performed by: NURSE PRACTITIONER

## 2020-11-16 PROCEDURE — 3008F PR BODY MASS INDEX (BMI) DOCUMENTED: ICD-10-PCS | Mod: CPTII,S$GLB,, | Performed by: NURSE PRACTITIONER

## 2020-11-16 PROCEDURE — 99999 PR PBB SHADOW E&M-EST. PATIENT-LVL V: ICD-10-PCS | Mod: PBBFAC,,, | Performed by: NURSE PRACTITIONER

## 2020-11-16 PROCEDURE — 3051F HG A1C>EQUAL 7.0%<8.0%: CPT | Mod: CPTII,S$GLB,, | Performed by: NURSE PRACTITIONER

## 2020-11-16 PROCEDURE — 3072F LOW RISK FOR RETINOPATHY: CPT | Mod: S$GLB,,, | Performed by: NURSE PRACTITIONER

## 2020-11-16 PROCEDURE — 3077F PR MOST RECENT SYSTOLIC BLOOD PRESSURE >= 140 MM HG: ICD-10-PCS | Mod: CPTII,S$GLB,, | Performed by: NURSE PRACTITIONER

## 2020-11-16 PROCEDURE — 3008F BODY MASS INDEX DOCD: CPT | Mod: CPTII,S$GLB,, | Performed by: NURSE PRACTITIONER

## 2020-11-16 PROCEDURE — 3077F SYST BP >= 140 MM HG: CPT | Mod: CPTII,S$GLB,, | Performed by: NURSE PRACTITIONER

## 2020-11-16 PROCEDURE — 3051F PR MOST RECENT HEMOGLOBIN A1C LEVEL 7.0 - < 8.0%: ICD-10-PCS | Mod: CPTII,S$GLB,, | Performed by: NURSE PRACTITIONER

## 2020-11-16 PROCEDURE — 3078F PR MOST RECENT DIASTOLIC BLOOD PRESSURE < 80 MM HG: ICD-10-PCS | Mod: CPTII,S$GLB,, | Performed by: NURSE PRACTITIONER

## 2020-11-16 PROCEDURE — 99215 PR OFFICE/OUTPT VISIT, EST, LEVL V, 40-54 MIN: ICD-10-PCS | Mod: S$GLB,,, | Performed by: NURSE PRACTITIONER

## 2020-11-16 PROCEDURE — 99215 OFFICE O/P EST HI 40 MIN: CPT | Mod: S$GLB,,, | Performed by: NURSE PRACTITIONER

## 2020-11-16 PROCEDURE — 99999 PR PBB SHADOW E&M-EST. PATIENT-LVL V: CPT | Mod: PBBFAC,,, | Performed by: NURSE PRACTITIONER

## 2020-11-16 PROCEDURE — 3078F DIAST BP <80 MM HG: CPT | Mod: CPTII,S$GLB,, | Performed by: NURSE PRACTITIONER

## 2020-11-16 RX ORDER — TACROLIMUS 0.5 MG/1
CAPSULE ORAL
Qty: 90 CAPSULE | Refills: 11 | Status: SHIPPED | OUTPATIENT
Start: 2020-11-16 | End: 2021-09-12 | Stop reason: SDUPTHER

## 2020-11-16 RX ORDER — PREDNISONE 5 MG/1
5 TABLET ORAL DAILY
Qty: 30 TABLET | Refills: 11 | Status: SHIPPED | OUTPATIENT
Start: 2020-11-16 | End: 2021-11-15 | Stop reason: SDUPTHER

## 2020-11-16 RX ORDER — MYCOPHENOLATE MOFETIL 250 MG/1
1000 CAPSULE ORAL 2 TIMES DAILY
Qty: 240 CAPSULE | Refills: 11 | Status: SHIPPED | OUTPATIENT
Start: 2020-11-16 | End: 2021-11-15 | Stop reason: SDUPTHER

## 2020-11-16 NOTE — PROGRESS NOTES
Kidney Post-Transplant Assessment    Referring Physician: Alexi Glasgow  Current Nephrologist: Olayinka Ewing    ORGAN: RIGHT KIDNEY  Donor Type: donation after brain death  PHS Increased Risk: yes  Cold Ischemia: 314 mins  Induction Medications: thymoglobulin    Subjective:     CC:  Reassessment of renal allograft function and management of chronic immunosuppression.    HPI:  Mr. Zamorano is a 65 y.o. 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  He has CKD stage 2 - GFR 60-89 and his baseline creatinine is between 1-1.4. He takes mycophenolate mofetil, prednisone and tacrolimus for maintenance immunosuppression. He denies any recent hospitalizations or ER visits since his previous clinic visit.    Hospitalization/ ED visits  None    Interval HX:  Reports doing well. Water intake has decreased only currently about 2 sherwood a day. Reports mild swelling. Home BP this morning 130/61. Weight has been stable Body mass index is 27.84 kg/m².      PCP: Martina last seen earlier this year.  General Nephrologist: Gildardo last seen 10/2019        Current Outpatient Medications:     apixaban (ELIQUIS) 5 mg Tab, Take 1 tablet (5 mg total) by mouth 2 (two) times daily., Disp: 60 tablet, Rfl: 11    aspirin 81 MG Chew, Take 81 mg by mouth once daily., Disp: , Rfl:     atorvastatin (LIPITOR) 40 MG tablet, Take 1 tablet (40 mg total) by mouth once daily., Disp: 90 tablet, Rfl: 0    blood sugar diagnostic Strp, To check BG 4 times daily, to use with insurance preferred meter-one touch, Disp: 400 each, Rfl: 3    blood-glucose meter kit, To check BG 4 times daily, to use with insurance preferred meter-one touch, Disp: 1 each, Rfl: 0    carvediloL (COREG) 25 MG tablet, Take 1 tablet (25 mg total) by mouth 2 (two) times daily., Disp: 180 tablet, Rfl: 3    famotidine (PEPCID) 20 MG tablet, Take 1 tablet (20 mg total) by mouth every evening., Disp: 90  "tablet, Rfl: 3    flecainide (TAMBOCOR) 100 MG Tab, Take 1 tablet (100 mg total) by mouth every 12 (twelve) hours., Disp: 60 tablet, Rfl: 0    gabapentin (NEURONTIN) 300 MG capsule, Take 1 tablet in the morning; 1 tablet midday, and 3 tablets at night., Disp: 450 capsule, Rfl: 3    insulin (LANTUS SOLOSTAR U-100 INSULIN) glargine 100 units/mL (3mL) SubQ pen, Inject 35 units at night, Disp: 12 mL, Rfl: 6    insulin lispro (HUMALOG KWIKPEN INSULIN) 100 unit/mL pen, Inject 18 units w/ breakfast, 16 units w/ lunch and dinner plus scale 150-200 +2, 201-250 +4, 251-300 +6, 301-350 +8., Disp: 30 mL, Rfl: 4    lancets Misc, To check BG 4 times daily, to use with insurance preferred meter-one touch, Disp: 400 each, Rfl: 3    meclizine (ANTIVERT) 25 mg tablet, Take 1 tablet (25 mg total) by mouth 3 (three) times daily as needed for Dizziness., Disp: 21 tablet, Rfl: 0    melatonin 5 mg Tab, Take 1 tablet (5 mg total) by mouth every evening., Disp: , Rfl:     mycophenolate (CELLCEPT) 250 mg Cap, Take 4 capsules (1,000 mg total) by mouth 2 (two) times daily., Disp: 240 capsule, Rfl: 11    NIFEdipine (PROCARDIA-XL) 30 MG (OSM) 24 hr tablet, Take 1 tablet (30 mg total) by mouth once daily., Disp: 90 tablet, Rfl: 3    pen needle, diabetic 32 gauge x 5/32" Ndle, USE TO ADMINISTER INSULIN 4 TIMES DAILY., Disp: 400 each, Rfl: 3    predniSONE (DELTASONE) 5 MG tablet, Take 1 tablet (5 mg total) by mouth once daily., Disp: 30 tablet, Rfl: 11    tacrolimus (PROGRAF) 0.5 MG Cap, Take 2 capsules (1 mg total) by mouth every morning AND 1 capsule (0.5 mg total) every evening., Disp: 90 capsule, Rfl: 11    valsartan (DIOVAN) 320 MG tablet, Take 1 tablet (320 mg total) by mouth once daily., Disp: 90 tablet, Rfl: 3    insulin detemir U-100 (LEVEMIR FLEXTOUCH U-100 INSULN) 100 unit/mL (3 mL) SubQ InPn pen, Inject 35 units at night. Voucher placed with rx via [employer]. (Patient not taking: Reported on 9/14/2020), Disp: 30 mL, Rfl: " "1    tamsulosin (FLOMAX) 0.4 mg Cap, Take 1 capsule (0.4 mg total) by mouth every evening. (Patient not taking: Reported on 11/16/2020), Disp: 90 capsule, Rfl: 3      Past Medical History:   Diagnosis Date    Anxiety 7/29/2014    Arthritis     Bilateral diabetic retinopathy 2017    CKD (chronic kidney disease) stage 2, GFR 60-89 ml/min 12/28/2016    CKD (chronic kidney disease) stage 4, GFR 15-29 ml/min 7/29/2014    Colon polyps 2014    Depression - situational 7/29/2014    Diabetes mellitus     Diabetes type 2 since 2000 7/29/2014    Diabetic neuropathy 7/29/2014    History of hepatitis C, s/p successful Rx w/ SVR24 - 9/2017 7/29/2014    Genotype 1a, treatment naive 10/2014 liver biopsy - grade 1 / stage 1 Completed Harvoni w/ SVR    Hyperlipidemia 7/29/2014    Hypertension     Neuropathy     Organ transplant candidate 7/29/2014    Pancreatitis        Review of Systems   Constitutional: Negative for appetite change, chills, fatigue and fever.   HENT: Negative for trouble swallowing.    Respiratory: Negative for cough, chest tightness, shortness of breath and wheezing.    Cardiovascular: Negative for chest pain, palpitations and leg swelling.   Gastrointestinal: Negative for abdominal pain, constipation, diarrhea and nausea.   Genitourinary: Negative for difficulty urinating, frequency and urgency.        + frothy urine   Musculoskeletal: Negative for arthralgias and myalgias.   Skin: Negative for rash.   Neurological: Positive for tremors (mild). Negative for dizziness, weakness, light-headedness and headaches.   Psychiatric/Behavioral: Negative for sleep disturbance.       Objective:   Blood pressure (!) 144/68, pulse (!) 56, temperature 97.1 °F (36.2 °C), temperature source Oral, resp. rate 18, height 6' 1" (1.854 m), weight 95.7 kg (210 lb 15.7 oz), SpO2 99 %.body mass index is 27.84 kg/m².    Physical Exam  Constitutional:       General: He is not in acute distress.     Appearance: He is " well-developed. He is not diaphoretic.   Cardiovascular:      Rate and Rhythm: Normal rate and regular rhythm.      Heart sounds: Normal heart sounds. No murmur. No friction rub. No gallop.    Pulmonary:      Effort: Pulmonary effort is normal. No respiratory distress.      Breath sounds: Normal breath sounds. No wheezing or rales.   Abdominal:      General: Bowel sounds are normal. There is no distension.      Palpations: Abdomen is soft.      Tenderness: There is no abdominal tenderness.   Musculoskeletal: Normal range of motion.         General: No tenderness.   Skin:     General: Skin is warm and dry.      Findings: No rash.      Nails: There is no clubbing.     Neurological:      Mental Status: He is alert and oriented to person, place, and time.   Psychiatric:         Behavior: Behavior normal.         Labs:  Lab Results   Component Value Date    WBC 5.47 11/10/2020    HGB 12.4 (L) 11/10/2020    HCT 41.8 11/10/2020     11/10/2020    K 4.4 11/10/2020     11/10/2020    CO2 30 (H) 11/10/2020    BUN 11 11/10/2020    CREATININE 1.1 11/10/2020    EGFRNONAA >60.0 11/10/2020    CALCIUM 9.0 11/10/2020    PHOS 3.2 11/10/2020    MG 1.5 (L) 11/10/2020    ALBUMIN 3.2 (L) 11/10/2020    ALBUMIN 3.2 (L) 11/10/2020    AST 13 11/10/2020    ALT <5 (L) 11/10/2020       No results found for: EXTANC, EXTWBC, EXTSEGS, EXTPLATELETS, EXTHEMOGLOBI, EXTHEMATOCRI, EXTCREATININ, EXTSODIUM, EXTPOTASSIUM, EXTBUN, EXTCO2, EXTCALCIUM, EXTPHOSPHORU, EXTGLUCOSE, EXTALBUMIN, EXTAST, EXTALT, EXTBILITOTAL, EXTLIPASE, EXTAMYLASE    No results found for: EXTCYCLOSLVL, EXTSIROLIMUS, EXTTACROLVL, EXTPROTCRE, EXTPTHINTACT, EXTPROTEINUA, EXTWBCUA, EXTRBCUA    Labs were reviewed with the patient.    Assessment:     1. S/P cadaveric kidney transplant 11/5/2016. ESRD secondary to HTN/DMII    2. CKD (chronic kidney disease) stage 2, GFR 60-89 ml/min    3. Type 2 diabetes mellitus with both eyes affected by moderate nonproliferative retinopathy  without macular edema, without long-term current use of insulin    4. Hyperlipidemia, unspecified hyperlipidemia type    5. Persistent atrial fibrillation    6. Anemia of chronic disease    7. Hyperparathyroidism due to renal insufficiency    8. Long-term use of immunosuppressant medication        Plan:   Continue to follow with PCP and General Nephrologist  Increase Mag in diet  Proteinuria. Patient to follow with Dr. Ewing on management       1. CKD stage 2: will continue follow up as per our center guidelines. patient to continue close follow up with the local General nephrologist. Education provided in appropriate fluid intake, potassium intake. Continue with oral hydration.      2. Immunosuppression: Prograf trough 6.2, which is therapeutic target 4-7. Continue Prograf 1/0.5, MMF 1000 Mg BID, and Prednisone 5 mg QD  Lab Results   Component Value Date    TACROLIMUS 6.2 11/10/2020    TACROLIMUS 6.7 12/03/2019    TACROLIMUS 7.4 11/19/2019   Will closely monitor for toxicities, education provided about adherence to medicines and need to communicate any side effect to the transplant nurse or physician.      3. Allograft Function:stable at baseline for the patient. Continue follow up as per our guidelines and with the local General nephrologist. Communication will be sent today.    11/10/2020  4yr 0mo   BUN 8 - 23 mg/dL 11   Creatinine 0.5 - 1.4 mg/dL 1.1   eGFR if African American >60 mL/min/1.73 m^2 >60.0   Glucose 70 - 110 mg/dL 174 (A)     Lab Results   Component Value Date    HGBA1C 7.6 (H) 11/10/2020   Takes long and short acting insulin --deferred management to PCP/Endocrinologist      4. Hypertension management:  Continue with home blood pressure monitoring, low salt and healthy life discussed with the patient.  BP Readings from Last 3 Encounters:   11/16/20 (!) 144/68   10/15/20 (!) 144/73   09/14/20 136/84   takes coreg, nifedipine, valsartan --deferred management to General Nephrology      5. Metabolic  Bone Disease/Secondary Hyperparathyroidism:calcium and phosphorus level discussed with the patient, patient will continue follow up with the general nephrologist for management of metabolic bone disease  calcium and phosphorus as per our center protocol. Will monitor PTH, Vit D level, calcium.   Lab Results   Component Value Date    .0 (H) 08/01/2019    CALCIUM 9.0 11/10/2020    CAION 1.11 11/06/2016    PHOS 3.2 11/10/2020    PHOS 2.7 12/03/2019    PHOS 2.8 11/19/2019       6. Electrolytes: reviewed with the patient, essentially within the normal range no need for acute changes today, will monitor as per our center guidelines.    11/10/2020  4yr 0mo   Magnesium 1.6 - 2.6 mg/dL 1.5 (A)     Lab Results   Component Value Date     11/10/2020    K 4.4 11/10/2020     11/10/2020    CO2 30 (H) 11/10/2020    CO2 26 09/11/2020    CO2 28 01/20/2020       7. Anemia: will continue monitoring as per our center guidelines. No indication for acute intervention today.  Lab Results   Component Value Date    WBC 5.47 11/10/2020    HGB 12.4 (L) 11/10/2020    HCT 41.8 11/10/2020    MCV 82 11/10/2020     (L) 11/10/2020       8.Proteinuria: will continue with pr/cr ratio as per our center guidelines  Lab Results   Component Value Date    PROTEINURINE 126 (H) 11/10/2020    CREATRANDUR 68.0 11/10/2020    UTPCR 1.85 (H) 11/10/2020    UTPCR 0.57 (H) 10/30/2019    UTPCR 0.68 (H) 10/23/2019   Proteinuria management deferred to General Nephrologist      9. BK virus infection screening: will continue with urine or blood PCR as per our guidelines to prevent BK virus viremia and allograft dysfunction  Lab Results   Component Value Date    BKVIRUSPCRQB <125 11/10/2020         10. Weight education: provided during the clinic visit.   Body mass index is 27.84 kg/m².       11.Patient safety education regarding immunosuppression including prophylaxis posttransplant for CMV, PCP : Education provided about vaccination and  prevention of infections.    12.  Cytopenias: no significant cytopenias will monitor as per our guidelines. Medicine list reviewed including potential causes of drug-induced cytopenias  Lab Results   Component Value Date    WBC 5.47 11/10/2020    HGB 12.4 (L) 11/10/2020    HCT 41.8 11/10/2020    MCV 82 11/10/2020     (L) 11/10/2020       Follow-up:   Annual follow-up with kidney transplant clinic with repeat labs, including renal function panel with UA, urine protein/creatinine ratio, and drug trough level q3 months.  Patient also reminded to follow-up with general nephrologist.    Labs: since patient remains at high risk for rejection and drug-related complications that warrant close monitoring, labs will be ordered as follows: continue twice weekly CBC, renal panel, and drug level for first month; then same labs once weekly through 3rd month post-transplant.  Urine for UA and protein/creatinine ratio monthly.  Serum BK - PCR at 1-, 3-, 6-, 9-, 12-, 18-, 24-, 36-, 48-, and 60 months post-transplant.  Hepatic panel at 1-, 2-, 3-, 6-, 9-, 12-, 18-, 24-, and 36- months post-transplant.    Education:   Material provided to the patient.  Patient reminded to call with any health changes since these can be early signs of significant complications.  Also, I advised the patient to be sure any new medications or changes of old medications are discussed with either a pharmacist or physician knowledgeable with transplant to avoid rejection/drug toxicity related to significant drug interactions.    Exercise: reminded Ravi of the importance of regular exercise for weight management, blood sugar and blood pressure management.  I also explained exercise has been shown to improve cardiovascular health, energy level, and sleep hygiene.  Lastly, I advised him that cardiovascular complications are leading cause of death for renal transplant recipients, and regular exercise can help lower this risk.    I spoke with the patient  for 30 minutes. More than half dedicated to counseling and education. All questions answered    Emilia Abreu NP-C  Transplant Nephrology      UNOS Patient Status  Functional Status: 90% - Able to carry on normal activity: minor symptoms of disease  Physical Capacity: No Limitations

## 2020-11-16 NOTE — LETTER
November 16, 2020        Olayinka Ewing  [address]  [facility] [address]  Phone: [phone]  Fax: [phone]             [facility]- Transplant 1st Fl  [address]  [facility] [address]  Phone: [phone]   Patient: Ravi Zamorano   MR Number: [id number]   YOB: 1955   Date of Visit: 11/16/2020       Dear Dr. Olayinka Ewing    Thank you for referring Ravi Zamorano to me for evaluation. Attached you will find relevant portions of my assessment and plan of care.    If you have questions, please do not hesitate to call me. I look forward to following Ravi Zamorano along with you.    Sincerely,    Emilia Abreu NP    Enclosure    If you would like to receive this communication electronically, please contact [email or web address] or [phone] to request [employer] access.    [employer] is a tool which provides read-only access to select patient information with whom you have a relationship. Its easy to use and provides real time access to review your patients record including encounter summaries, notes, results, and demographic information.    If you feel you have received this communication in error or would no longer like to receive these types of communications, please e-mail [email or web address]

## 2020-11-16 NOTE — PATIENT INSTRUCTIONS
It is my privilege to participate in your transplant care! Please be sure to let us know if you have any questions or concerns about your health care - we cannot help you if we do not know. Don't forget we are on call 24/7 for any emergencies.      Education:  Patient reminded to call with any health changes, since these can be early signs of significant complications. Also, advised the patient to be sure any new medications or changes of old medications are discussed with either a pharmacist, or physician knowledgeable with transplant to avoid rejection/drug toxicity related to significant drug interactions.     Exercise: reminded Ravi of the importance of regular exercise for weight management, blood sugar and blood pressure management.  I also explained exercise has been shown to improve cardiovascular health, energy level, and sleep hygiene.  Lastly, I advised him that cardiovascular complications are leading cause of death for renal transplant recipients, and regular exercise can help lower this risk.     Please continue seeking general medical care with your primary care provider.    Please continue seeking care for your kidney with your General Nephrologist. This is to ensure that monitoring and care of your kidney is being addressed in a timely manner. Your General Nephrologist will also manage your blood pressure and possible electrolyte abnormalities in your lab work; ei. Potassium, phosphorus, calcium, magnesium, PTH, and Vitamin D.     Please follow with a Dermatologist (skin doctor) for yearly skin checks starting at 1 year post transplant or if your develp skin lesion/new moles. Your are on immunosuppression therapy to help prevent rejection of your transplanted kidney. A side effect of this medications is skin cancer. You should see the Dermatologist at least yearly. If at any point after transplant a skin lesion was remove please update your coordinator with the information.       Best  Emilia Cleaning, NP-C       Please add/increase foods high in Magnesium into your diet. Please see the following for a list     Pumpkin seed - kernels: Serving Size 1 oz, 168 mg   Almonds, dry roasted: Serving Size 1 oz, 80 mg   Spinach, boiled: Serving Size ½ cup, 78 mg   Cashews, dry roasted: Serving Size 1 oz, 74 mg   Pumpkin seeds in shell: Serving Size 1 oz, 74 mg   Peanuts, oil roasted: Serving Size ¼ cup, 63 mg   Cereal, shredded wheat: Serving Size 2 large biscuits, 61 mg   Soymilk, plain or vanilla: Serving Size 1 cup, 61 mg   Black beans, cooked: Serving Size ½ cup, 60 mg   Edamame, shelled, cooked: Serving Size ½ cup, 50 mg   Dark chocolate -60-69% cacoa: Serving Size 1 oz, 50 mg   Peanut butter, smooth: Serving Size 2 tablespoons, 49 mg   Bread, whole wheat: Serving Size 2 slices, 46 mg   Avocado, cubed: Serving Size 1 cup, 44 mg   Potato, baked with skin: Serving Size 3.5 oz, oz, 43 mg   Rice, brown, cooked: Serving Size ½ cup, 42 mg   Yogurt, plain, low fat: Serving Size 8 oz, 42 mg   Breakfast cereals fortified: Serving Size 10% fortification, 40 mg   Oatmeal, instant: Serving Size 1 packet, 36 mg   Kidney beans, canned: Serving Size ½ cup, 35 mg   Banana: Serving Size 1 medium, 32 mg   Cocoa powder- unsweetened: Serving Size 1 tablespoon, 27 mg   Spout [place], [place], farmed: Serving Size 3 oz, 26 mg   Milk: Serving Size 1 cup, 24-27 mg   Halibut, cooked: Serving Size 3 oz, 24 mg   Raisins: Serving Size ½ cup, 23 mg   Chicken breast, roasted: Serving Size 3 oz, 22 mg   Beef, ground, 90% lean: Serving Size 3 oz, 20 mg   Broccoli, chopped & cooked: Serving Size ½ cup, 12 mg   Rice, white, cooked: Serving Size ½ cup, 10 mg   Apple: Serving Size 1 medium, 9 mg   Carrot, raw: Serving Size 1 medium, 7 mg

## 2020-11-17 ENCOUNTER — TELEPHONE (OUTPATIENT)
Dept: INTERNAL MEDICINE | Facility: CLINIC | Age: 65
End: 2020-11-17

## 2020-11-17 NOTE — TELEPHONE ENCOUNTER
Please call patient.  His A1c is slightly above goal at 7.6%.  Cholesterol is acceptable range.  Please work on diet.  Would he be interested in seeing Karan Lopez again?

## 2020-11-23 ENCOUNTER — PATIENT OUTREACH (OUTPATIENT)
Dept: ADMINISTRATIVE | Facility: OTHER | Age: 65
End: 2020-11-23

## 2020-11-23 NOTE — PROGRESS NOTES
LINKS immunization registry not responding  Care Everywhere updated  Health Maintenance updated  Chart reviewed for overdue Proactive [facility] Encounters (MOOKIE) health maintenance testing (CRS, Breast Ca, Diabetic Eye Exam)   Orders entered:N/A

## 2020-11-25 ENCOUNTER — TELEPHONE (OUTPATIENT)
Dept: UROLOGY | Facility: CLINIC | Age: 65
End: 2020-11-25

## 2020-11-25 ENCOUNTER — OFFICE VISIT (OUTPATIENT)
Dept: UROLOGY | Facility: CLINIC | Age: 65
End: 2020-11-25
Payer: COMMERCIAL

## 2020-11-25 VITALS
HEIGHT: 73 IN | HEART RATE: 62 BPM | WEIGHT: 203.94 LBS | BODY MASS INDEX: 27.03 KG/M2 | SYSTOLIC BLOOD PRESSURE: 168 MMHG | DIASTOLIC BLOOD PRESSURE: 84 MMHG

## 2020-11-25 DIAGNOSIS — N48.6 PEYRONIE'S SYNDROME: ICD-10-CM

## 2020-11-25 DIAGNOSIS — N40.1 BPH WITH URINARY OBSTRUCTION: ICD-10-CM

## 2020-11-25 DIAGNOSIS — Z79.4 TYPE 2 DIABETES MELLITUS WITH STAGE 2 CHRONIC KIDNEY DISEASE, WITH LONG-TERM CURRENT USE OF INSULIN: ICD-10-CM

## 2020-11-25 DIAGNOSIS — N13.8 BPH WITH URINARY OBSTRUCTION: ICD-10-CM

## 2020-11-25 DIAGNOSIS — N52.01 ERECTILE DYSFUNCTION DUE TO ARTERIAL INSUFFICIENCY: ICD-10-CM

## 2020-11-25 DIAGNOSIS — N48.6 PEYRONIE'S DISEASE: Primary | ICD-10-CM

## 2020-11-25 DIAGNOSIS — E78.5 HYPERLIPIDEMIA, UNSPECIFIED HYPERLIPIDEMIA TYPE: ICD-10-CM

## 2020-11-25 DIAGNOSIS — N18.2 TYPE 2 DIABETES MELLITUS WITH STAGE 2 CHRONIC KIDNEY DISEASE, WITH LONG-TERM CURRENT USE OF INSULIN: ICD-10-CM

## 2020-11-25 DIAGNOSIS — I10 ESSENTIAL HYPERTENSION: ICD-10-CM

## 2020-11-25 DIAGNOSIS — E11.22 TYPE 2 DIABETES MELLITUS WITH STAGE 2 CHRONIC KIDNEY DISEASE, WITH LONG-TERM CURRENT USE OF INSULIN: ICD-10-CM

## 2020-11-25 PROBLEM — Z12.5 PROSTATE CANCER SCREENING: Status: RESOLVED | Noted: 2017-02-14 | Resolved: 2020-11-25

## 2020-11-25 PROBLEM — Z92.89 HISTORY OF CARDIOVERSION: Status: RESOLVED | Noted: 2019-10-03 | Resolved: 2020-11-25

## 2020-11-25 PROBLEM — R35.1 NOCTURIA MORE THAN TWICE PER NIGHT: Status: RESOLVED | Noted: 2017-02-14 | Resolved: 2020-11-25

## 2020-11-25 PROBLEM — I16.0 HYPERTENSIVE URGENCY: Status: RESOLVED | Noted: 2019-03-15 | Resolved: 2020-11-25

## 2020-11-25 PROCEDURE — 3072F PR LOW RISK FOR RETINOPATHY: ICD-10-PCS | Mod: S$GLB,,, | Performed by: UROLOGY

## 2020-11-25 PROCEDURE — 3079F DIAST BP 80-89 MM HG: CPT | Mod: CPTII,S$GLB,, | Performed by: UROLOGY

## 2020-11-25 PROCEDURE — 3051F PR MOST RECENT HEMOGLOBIN A1C LEVEL 7.0 - < 8.0%: ICD-10-PCS | Mod: CPTII,S$GLB,, | Performed by: UROLOGY

## 2020-11-25 PROCEDURE — 1126F AMNT PAIN NOTED NONE PRSNT: CPT | Mod: S$GLB,,, | Performed by: UROLOGY

## 2020-11-25 PROCEDURE — 3008F BODY MASS INDEX DOCD: CPT | Mod: CPTII,S$GLB,, | Performed by: UROLOGY

## 2020-11-25 PROCEDURE — 3051F HG A1C>EQUAL 7.0%<8.0%: CPT | Mod: CPTII,S$GLB,, | Performed by: UROLOGY

## 2020-11-25 PROCEDURE — 99999 PR PBB SHADOW E&M-EST. PATIENT-LVL V: ICD-10-PCS | Mod: PBBFAC,,, | Performed by: UROLOGY

## 2020-11-25 PROCEDURE — 3077F PR MOST RECENT SYSTOLIC BLOOD PRESSURE >= 140 MM HG: ICD-10-PCS | Mod: CPTII,S$GLB,, | Performed by: UROLOGY

## 2020-11-25 PROCEDURE — 3079F PR MOST RECENT DIASTOLIC BLOOD PRESSURE 80-89 MM HG: ICD-10-PCS | Mod: CPTII,S$GLB,, | Performed by: UROLOGY

## 2020-11-25 PROCEDURE — 3072F LOW RISK FOR RETINOPATHY: CPT | Mod: S$GLB,,, | Performed by: UROLOGY

## 2020-11-25 PROCEDURE — 3077F SYST BP >= 140 MM HG: CPT | Mod: CPTII,S$GLB,, | Performed by: UROLOGY

## 2020-11-25 PROCEDURE — 1126F PR PAIN SEVERITY QUANTIFIED, NO PAIN PRESENT: ICD-10-PCS | Mod: S$GLB,,, | Performed by: UROLOGY

## 2020-11-25 PROCEDURE — 99999 PR PBB SHADOW E&M-EST. PATIENT-LVL V: CPT | Mod: PBBFAC,,, | Performed by: UROLOGY

## 2020-11-25 PROCEDURE — 3008F PR BODY MASS INDEX (BMI) DOCUMENTED: ICD-10-PCS | Mod: CPTII,S$GLB,, | Performed by: UROLOGY

## 2020-11-25 PROCEDURE — 99214 PR OFFICE/OUTPT VISIT, EST, LEVL IV, 30-39 MIN: ICD-10-PCS | Mod: S$GLB,,, | Performed by: UROLOGY

## 2020-11-25 PROCEDURE — 99214 OFFICE O/P EST MOD 30 MIN: CPT | Mod: S$GLB,,, | Performed by: UROLOGY

## 2020-11-25 NOTE — PROGRESS NOTES
CHIEF COMPLAINT:    Mr. Zamorano is a 65 y.o. male presenting with peyronie's and ED.    PRESENTING ILLNESS:    Ravi Zamorano is a 65 y.o. male with a history of kidney transplant who c/o peyronie's disease.  It's been present for > 1 year. He has ~ 90 degrees of curve to the L.  It's difficult to penetrate due to the curve.  Sex is not painful for him or his partner.    He has ED.  Has been present for > 1 year.  He's tried and failed Viagra and Cialis.  He has diastolic dysfunction.      He has LUTS.  Nocturia x 3.  Good FOS.  No straining.  No hematuria.  No dysuria.  He's pleased with how he voids.    REVIEW OF SYSTEMS:    Ravi Zamorano denies headache, blurred vision, fever, nausea, vomiting, chills, abdominal pain, chest pain, sore throat, bleeding per rectum, cough, SOB, recent loss of consciousness, recent mental status changes, seizures, dizziness, or upper or lower extremity weakness.    AMBER  1. 2  2. 1  3. 0  4. 0  5. 0      PATIENT HISTORY:    Past Medical History:   Diagnosis Date    Anxiety 7/29/2014    Arthritis     Bilateral diabetic retinopathy 2017    CKD (chronic kidney disease) stage 2, GFR 60-89 ml/min 12/28/2016    CKD (chronic kidney disease) stage 4, GFR 15-29 ml/min 7/29/2014    Colon polyps 2014    Depression - situational 7/29/2014    Diabetes mellitus     Diabetes type 2 since 2000 7/29/2014    Diabetic neuropathy 7/29/2014    History of cardioversion 10/3/2019    History of hepatitis C, s/p successful Rx w/ SVR24 - 9/2017 7/29/2014    Genotype 1a, treatment naive 10/2014 liver biopsy - grade 1 / stage 1 Completed Harvoni w/ SVR    Hyperlipidemia 7/29/2014    Hypertension     Neuropathy     Organ transplant candidate 7/29/2014    Pancreatitis     S/P cadaveric kidney transplant 11/5/2016. ESRD secondary to HTN/DMII 11/5/2016       Past Surgical History:   Procedure Laterality Date    APPENDECTOMY      KIDNEY TRANSPLANT      TRANSESOPHAGEAL ECHOCARDIOGRAPHY  N/A 8/26/2019    Procedure: ECHOCARDIOGRAM, TRANSESOPHAGEAL;  Surgeon: Dolores Diagnostic Provider;  Location: [facility];  Service: Cardiology;  Laterality: N/A;    TREATMENT OF CARDIAC ARRHYTHMIA N/A 8/26/2019    Procedure: CARDIOVERSION;  Surgeon: Bronson Bowden MD;  Location: [facility];  Service: Cardiology;  Laterality: N/A;  AF, FELICIANO, DCCV, MAC, GP, 3 PREP       Family History   Problem Relation Age of Onset    Diabetes Mother     Hypertension Mother     Heart disease Mother         CAD with PCI    Heart disease Father     Cancer Brother         one sister with breast cancer    Hypertension Brother         one sister with HTN and borderline DM    Stroke Neg Hx     Kidney disease Neg Hx        Social History     Socioeconomic History    Marital status:      Spouse name: Not on file    Number of children: Not on file    Years of education: Not on file    Highest education level: Not on file   Occupational History     Employer: [employer]   Social Needs    Financial resource strain: Not on file    Food insecurity     Worry: Not on file     Inability: Not on file    Transportation needs     Medical: Not on file     Non-medical: Not on file   Tobacco Use    Smoking status: Former Smoker     Packs/day: 0.50     Years: 32.00     Pack years: 16.00     Types: Cigarettes     Quit date: 11/28/2016     Years since quitting: 3.9    Smokeless tobacco: Never Used    Tobacco comment: Pt reports that he quit in 2013, but started up again in 2015. pt reports he is currently working on quitting again   Substance and Sexual Activity    Alcohol use: Yes     Comment: Pt reports occasional beers on Sundays. Pt reports drinking daily prior to ESRD diagnosis.    Drug use: No    Sexual activity: Yes     Partners: Female   Lifestyle    Physical activity     Days per week: Not on file     Minutes per session: Not on file    Stress: Not on file   Relationships    Social connections     Talks on  phone: Not on file     Gets together: Not on file     Attends Protestant service: Not on file     Active member of club or organization: Not on file     Attends meetings of clubs or organizations: Not on file     Relationship status: Not on file   Other Topics Concern    Not on file   Social History Narrative     for 14 years     for 29 years    2 children ages 35, 36       Allergies:  Patient has no known allergies.    Medications:    Current Outpatient Medications:     apixaban (ELIQUIS) 5 mg Tab, Take 1 tablet (5 mg total) by mouth 2 (two) times daily., Disp: 60 tablet, Rfl: 11    aspirin 81 MG Chew, Take 81 mg by mouth once daily., Disp: , Rfl:     atorvastatin (LIPITOR) 40 MG tablet, Take 1 tablet (40 mg total) by mouth once daily., Disp: 90 tablet, Rfl: 0    blood sugar diagnostic Strp, To check BG 4 times daily, to use with insurance preferred meter-one touch, Disp: 400 each, Rfl: 3    blood-glucose meter kit, To check BG 4 times daily, to use with insurance preferred meter-one touch, Disp: 1 each, Rfl: 0    carvediloL (COREG) 25 MG tablet, Take 1 tablet (25 mg total) by mouth 2 (two) times daily., Disp: 180 tablet, Rfl: 3    famotidine (PEPCID) 20 MG tablet, Take 1 tablet (20 mg total) by mouth every evening., Disp: 90 tablet, Rfl: 3    flecainide (TAMBOCOR) 100 MG Tab, Take 1 tablet (100 mg total) by mouth every 12 (twelve) hours., Disp: 60 tablet, Rfl: 0    gabapentin (NEURONTIN) 300 MG capsule, Take 1 tablet in the morning; 1 tablet midday, and 3 tablets at night., Disp: 450 capsule, Rfl: 3    insulin (LANTUS SOLOSTAR U-100 INSULIN) glargine 100 units/mL (3mL) SubQ pen, Inject 35 units at night, Disp: 12 mL, Rfl: 6    insulin detemir U-100 (LEVEMIR FLEXTOUCH U-100 INSULN) 100 unit/mL (3 mL) SubQ InPn pen, Inject 35 units at night. Voucher placed with rx via [employer]., Disp: 30 mL, Rfl: 1    insulin lispro (HUMALOG KWIKPEN INSULIN) 100 unit/mL pen, Inject 18 units w/  "breakfast, 16 units w/ lunch and dinner plus scale 150-200 +2, 201-250 +4, 251-300 +6, 301-350 +8., Disp: 30 mL, Rfl: 4    lancets Misc, To check BG 4 times daily, to use with insurance preferred meter-one touch, Disp: 400 each, Rfl: 3    meclizine (ANTIVERT) 25 mg tablet, Take 1 tablet (25 mg total) by mouth 3 (three) times daily as needed for Dizziness., Disp: 21 tablet, Rfl: 0    melatonin 5 mg Tab, Take 1 tablet (5 mg total) by mouth every evening., Disp: , Rfl:     mycophenolate (CELLCEPT) 250 mg Cap, Take 4 capsules (1,000 mg total) by mouth 2 (two) times daily., Disp: 240 capsule, Rfl: 11    NIFEdipine (PROCARDIA-XL) 30 MG (OSM) 24 hr tablet, Take 1 tablet (30 mg total) by mouth once daily., Disp: 90 tablet, Rfl: 3    pen needle, diabetic 32 gauge x 5/32" Ndle, USE TO ADMINISTER INSULIN 4 TIMES DAILY., Disp: 400 each, Rfl: 3    predniSONE (DELTASONE) 5 MG tablet, Take 1 tablet (5 mg total) by mouth once daily., Disp: 30 tablet, Rfl: 11    tacrolimus (PROGRAF) 0.5 MG Cap, Take 2 capsules (1 mg total) by mouth every morning AND 1 capsule (0.5 mg total) every evening., Disp: 90 capsule, Rfl: 11    tamsulosin (FLOMAX) 0.4 mg Cap, Take 1 capsule (0.4 mg total) by mouth every evening., Disp: 90 capsule, Rfl: 3    valsartan (DIOVAN) 320 MG tablet, Take 1 tablet (320 mg total) by mouth once daily., Disp: 90 tablet, Rfl: 3    PHYSICAL EXAMINATION:    The patient generally appears in good health, is appropriately interactive, and is in no apparent distress.     Eyes: anicteric sclerae, moist conjunctivae; no lid-lag; PERRLA     HENT: Atraumatic; oropharynx clear with moist mucous membranes and no mucosal ulcerations;normal hard and soft palate.  No evidence of lymphadenopathy.    Neck: Trachea midline.  No thyromegaly.    Musculoskeletal: No abnormal gait.    Skin: No lesions.    Mental: Cooperative with normal affect.  Is oriented to time, place, and person.    Neuro: Grossly intact.    Chest: Normal " inspiratory effort.   No accessory muscles.  No audible wheezes.  Respirations symmetric on inspiration and expiration.    Heart: Regular rhythm.      Abdomen:  Soft, non-tender. No masses or organomegaly. Bladder is not palpable. No evidence of flank discomfort. No evidence of inguinal hernia.    Genitourinary: The penis is circumcised with a plaque c/w peyronie's on the shaft. The urethral meatus is normal. The testes, epididymides, and cord structures are normal in size and contour bilaterally. The scrotum is normal in size and contour.    Normal anal sphincter tone. No rectal mass.    The prostate is smooth. Normal landmarks. Lateral sulci. Median furrow intact.  No nodularity or induration. Seminal vesicles are normal.    Extremities: No clubbing, cyanosis, or edema      LABS:      Lab Results   Component Value Date    PSA 2.0 10/24/2016    PSA 1.4 07/29/2014    PSADIAG 2.2 11/10/2020    PSADIAG 1.7 04/09/2018       IMPRESSION:    Encounter Diagnoses   Name Primary?    Peyronie's disease Yes    Erectile dysfunction due to arterial insufficiency     BPH with urinary obstruction     Essential hypertension     Hyperlipidemia, unspecified hyperlipidemia type     Type 2 diabetes mellitus with stage 2 chronic kidney disease, with long-term current use of insulin    HTN, controlled  Hyperlipidemia, controlled  DM, controlled      PLAN:    1. Will observe his LUTS as they don't bother him.  2. Discussed that he has both ED and peyronie's.  Recommend IPP.  3. Discussed that he's at higher risk for infection given his kidney transplant status.  4. Patient read the IPP handout and understands the risks associated with this procedure. He knows the risk of infection as well as surgery requirements if it were to become infected. He understands the impact on spontaneous and nocturnal erections, as well as need for replacement and repair, which was clearly outlined in verbal and written form. He was given the chance to  ask questions and alternatives were discussed.  5. Risks discussed were You have elected to undergo placement of a penile prosthesis. Several devices are currently available in the marketplace, including those which are non-inflatable, versus two- or three-piece inflatable prostheses. All of these devices have the capacity to give you the opportunity to have a rigid penis on demand and to be used as frequently and as long as you would like. There are, therefore, many advantages to the use of the prosthesis which you have already discussed with your physician. The goal of this informed consent form is to identify the potential problems that have been recognized to occur with penile prosthesis placement and that you understand the risks of undergoing prosthetic placement. It is important to recognize that as a result of improvements in design as well as better surgical technique, that all of the risks listed below are less than they were even 10 years ago. It is also important to recognize that most of the available studies report on historical data for devices which are now either not manufactured or have undergone structural improvements to reduce those side effects. The complications are simply noted in random order and do not reflect their frequency.    1. Prosthesis mechanical failure occurs as a result of leakage of fluid from the inflatable types of devices. This typically occurs as a result of a fracture in the tubing, most commonly as it emerges out of the scrotal pump, but it can also be due to a leak from the erectile cylinders in the penis. It is felt that this occurs as a result of repetitive mechanical trauma, which weakens the tubing and causes it to crack. When the fluid leaks, it is not something you would be aware of. It does not cause pain. The fluid contained within the device is typically a sterile saline solution that will simply be reabsorbed by the body. What you will recognize is that when you  "squeeze the pump, there will be no transfer of fluid and no rigidity or inadequate rigidity. The most recent long-term data looking at 5-10 year success reports mechanical failure in the 5-10% range over that period of time. Looked at another way, 90-95% of men will have a functional prosthesis in 10 years. These devices are designed to be used for life. Each company has its own warrantee which you should discuss with your physician.    2. Infection is a disastrous complication which usually requires the removal of the prosthesis, as it is rare to be able to clear infection so long as the prosthesis is within the body. Occasionally, the infected prosthesis can be removed, the location of the device can be washed out vigorously with a special antibiotic salvage procedure and then a new device may be able to be immediately placed. When this is not possible, the infected device is removed and then a period of healing will follow where the device can be replaced after a period of healing (6 weeks to 6 months). Delayed replacement of a prosthesis after initial removal is a more complicated operation associated with the potential for not being able to replace the device because of scarring but other problems such as shortening of the penis or change in sensation and shape may also occur.    As a result of improved surgical technique and device design, infection rates are now markedly reduced, typically being reported in the <1-2% range for new prosthesis placements and up to 3% when the prosthesis is uninfected and has mechanically failed.    3. Bleeding and hematoma are rarely a problem. There is likely to be localized swelling as well as "black and blue" of the penis, groin area, and scrotal sack. These will resolve without treatment over 1-3 weeks. Rarely a scrotal hematoma (collection of blood) will develop which can be treated with rest; it will reabsorb with time or, if it is growing in size or painful, it may need " to be evacuated surgically (opened up and washed out). The risk of needing a blood transfusion after penile prosthesis placement is extremely rare to nonexistent.    4. Post-operative pain is variable and can be minimal, but in most men it is quite significant. Your physician will provide you with adequate pain medication to help control the pain, but not necessarily eliminate it entirely. As the prosthesis heals, there will be complete resolution of the pain, such that you will typically not even be aware that the prosthesis is within your body unless you were to touch it directly. Complete pain resolution will most commonly occur within 4-12 weeks postoperatively. Post-operative pain is typically managed with oral narcotic agents. You should be aware that these drugs can cause constipation as well as sleepiness; therefore, you should not be in any position where you might need to make important decision or driving until the pain is under better control without the need for narcotic pain killers. It is recommended that during the first several days after the operation, that you spend as little time as possible on your feet, as this will encourage healing, reduce swelling, and result in more rapid resolution of pain.    5. Loss of length -  Penile shortening is probably the reason that most men are disappointed with the outcome from their prosthesis surgery. Studies have shown that when the flaccid penile stretched length is measured preoperatively, that the actual loss of length following placement of the prosthesis is on average no more than one centimeter (1/3 inch). To reduce the likelihood of loss of length, the surgeon will do his best to place the proper size device that fits your penis. You can expect that the length of your penis will be much like what you see when you grab the head of the penis and pull it straight out away from your body. Many men who believe they have lost length in their penis following  prosthesis surgery in fact did not really lose length because of the surgery, but rather because they have not had had a full erection for some time during which there may have been some loss of tissue elasticity and thereby shortening of the penis. In addition, they may have gained some weight in the pubic area which will cover the penis and make it look shorter. Lastly, they may be expecting that the postoperative result will be like the erections they had when they were a much younger man. Although the goal is to make the penis as long as possible, one can expect that the rigidity of the penis will be much like the erections obtained as a younger man.    6. Decreased girth - Girth is typically not substantially changed as most cylinders can expand and fill the penis satisfactorily, but if there has been scarring within the tissues of the penis, this can prevent expansion and result in a narrower appearing penis. The surgeon will do his best to put the largest cylinder in the penis, but there are limitations to which the tissues can expand. Decreased girth is not a common complaint.    7. Sensory loss - By and large the surgical techniques being used to avoid injury to the sensory nerves of the penis. Therefore, there is rarely any significant change in the sexual sensation of the penis. Some men find that it takes longer for them to experience orgasm. This may occur because they can simply inflate the penis without any sexuall arousal, and then it will seem to take longer for them to become aroused, resulting in the prolonged time to orgasm. Therefore, proper sexual stimulation is important to enjoy the entire sexual experience with a prosthesis.    8. Change in shape - The overall shape or configuration of the penis is rarely altered as a result of placement of any type of prosthesis, but if there is some unidentified scarring involving the penis such as that which occurs with Peyronie's disease, some curvature  "or indentations including hourglass narrowing can be identified along the shaft. Typically, in the postoperative period, the prosthesis will cause an internal tissue expansion which will correct these deformities. This may take 6-9 months occur.    9. Erosion or cylinder extrusion - If the tissues in the tips of the penis are weakened either by previous internal penile disease or as a result of the surgery, the prosthetic cylinder may migrate distally into the head of the penis and may appear to be ready to poke through the skin or the urethra. By all means, if such an irregularity is seen, one should address it with your doctor before the prosthesis is exposed so that it can be corrected without developing infection. This problem occurs in <1% of cases.    10. Pump problems - These include difficulty in activating or deactivating the pump as well as change of pump location due to migration or fixation of the pump to the scrotal skin. These problems are also quite unusual but can happen as a result of an altered healing process or a malposition of the pump by the implanting surgeon. If the pump were to migrate or become fixed or difficult to manipulate because of its position, a simple outpatient scrotal procedure can be performed to reposition the pump which is almost universally successful. This procedure is performed in no more than 1% of cases.    Prosthesis care - In the postoperative period, it is best to take the antibiotics prescribed by your physician, reduce your physical activity to reduce swelling and enhance healing, take pain medicine for your comfort, and avoid submergingthe incision during bathing for a minimum of 1-4 weeks. Specific bathing instructions will be issued by your physician. It is not uncommon to have an inflatable prosthesis partially fill during the postoperative period involuntarily. This is called "auto-inflation." To prevent this problem, it is important that once the prosthesis is " "activated, which is typically 4-6 weeks after surgery, that you perform what is known as "cycling" of the device. Cycling means complete inflation and then complete deflation of the penile cylinders twice per day for one month. In doing this, the tissues around the prosthesis are softened and stretched allowing complete deflation of the device into the reservoir. It also will allow you to become more familiar with operating the device and make it easier for you to activate it quickly and inconspicuously when you want to engage in sexual relations. If you have an inflatable device with a scrotal pump, it is best to inflate it without twisting it. The repetitive twisting of the pump can weaken the connections between the tubing and the pump and is the most common cause for mechanical failure of the prosthesis.    It is hoped that this review will inform you of the potential problems associated with your prosthesis and as a result, will make for more realistic expectations regarding the outcome.      Copy to:       "

## 2020-11-27 ENCOUNTER — TELEPHONE (OUTPATIENT)
Dept: OPTOMETRY | Facility: CLINIC | Age: 65
End: 2020-11-27

## 2020-11-27 NOTE — TELEPHONE ENCOUNTER
Vamshi freeman ins benefits as of 11/30/20:    Member: [employer]  YOB: 1955  Subscriber ID: [id number]  Product Name:   Plan Code: EF  Please Note: Member must be eligible at date of service to receive benefit.  In Network Coverage Frequency  Category Benefit Eligibility Frequency  Exam Available 1 every 12 month(s)  Maternity Exam Available 2 every 12 month(s)  Selection Contact Lens Fit Available 1 every 12 month(s)  Non-Selection Contact Lens Fit Available 1 every 12 month(s)  Frame Available 1 every 12 month(s)  Lenses Available 1 every 12 month(s)  Selection Contact Lenses - Daily Wearï Available Every 12 month(s)  Selection Contact Lenses - Monthly Wearï Available Every 12 month(s)  Non-Selection Contact Lensesï Available Every 12 month(s)  Maternity Replacement Frames Not Covered 1 every 12 month(s)  Maternity Replacement All Lenses Not Covered 1 every 12 month(s)   Dilated Fundus Exam: Recommended  ï Contact Lenses are in Lieu of Eyeglasses  In Network Coverage  V[facility] Services Patient Responsibility  (includes applicable copay)  Professional Services  Exam $10.00  Maternity Exam $10.00  Non-Selection Contact Lens Fit 100% of Billed Charges  Selection Contact Lens Fit Covered-in-Full  Frames  Frame 70.00% of Balance over $130.00 Benefit Allowance  Lenses  Lenses / Blended Bifocals 80% of Billed Charges  Lenses / Free-form SV Lenses 80% of Billed Charges  Lenses / MF Aspheric Lenses 80% of Billed Charges  Lenses / Occupational Double Seg Lenses 80% of Billed Charges  Lenses / Progressive Lenses: Tier 1 (Standard) $70.00  Lenses / Progressive Lenses: Tier 2 (Deluxe) $110.00  Lenses / Progressive Lenses: Tier 3 (Premium) $150.00  Lenses / Progressive Lenses: Tier 4 (Platinum) $250.00  Lenses / Progressive Lenses: Tier 5 (Non-formulary) 80% of Billed Charges  Rendered: 11/27/2020 9:08 AM Page 1 of 2  Lenses / Standard Lenses Covered-in-Full  Lenses / SV Aspheric Lenses 80% of Billed  Charges  Lens Materials  (Pricing shown is in addition to Patient Responsibility from Lens section above)  High Index 1.66 - 1.73 $63.00  High Index less than or equal to 1.66 $53.00  High Index, >= 1.74 80% of Billed Charges  Polycarbonate Lenses Covered-in-Full for Ages 0-18  Polycarbonate Lenses $33.00 for Ages 19+  Lens Options  Chemistrie Clip 100% of Billed Charges  Edge Coating 80% of Billed Charges  Miscellaneous Lens Options 80% of Billed Charges  Non-Formulary Anti-Reflective Coating 80% of Billed Charges  One Year Scratch Warranty $10.00  Oversize Lenses 80% of Billed Charges  Photochromic $67.00  Platinum Anti-Reflective Coating $90.00  Polarized 80% of Billed Charges  Polished Edges / Roll & Polish $13.00  Premium Anti-Reflective Coating $80.00  Scratch Coating Covered-in-Full  Standard Anti-Reflective Coating $40.00  Tint $14.00  UV Coating $16.00  All other lens options - 20% off Provider's retail price  Contact Lenses  Necessary Contact Lenses ï Covered-in-Full  Non-Selection Contact Lensesï 100.00% of Balance over $105.00 Benefit Allowance  Selection Contact Lenses - Daily Wearï Covered-in-Full for up to 4 Boxes  Selection Contact Lenses - Monthly Wearï Covered-in-Full for up to 2 Boxes  Please use the Selection Contact Lenses for Vision Plans Formulary. Contact lenses (including disposable lenses), the  fitting/evaluation fees, and up to two follow-up visits are covered-in-full up to the maximum allowed in a benefit year.

## 2020-11-30 ENCOUNTER — OFFICE VISIT (OUTPATIENT)
Dept: OPTOMETRY | Facility: CLINIC | Age: 65
End: 2020-11-30
Payer: COMMERCIAL

## 2020-11-30 DIAGNOSIS — H52.03 HYPEROPIA WITH REGULAR ASTIGMATISM AND PRESBYOPIA, BILATERAL: ICD-10-CM

## 2020-11-30 DIAGNOSIS — Z94.0 KIDNEY REPLACED BY TRANSPLANT: ICD-10-CM

## 2020-11-30 DIAGNOSIS — H52.223 HYPEROPIA WITH REGULAR ASTIGMATISM AND PRESBYOPIA, BILATERAL: ICD-10-CM

## 2020-11-30 DIAGNOSIS — Z01.00 EYE EXAM, ROUTINE: Primary | ICD-10-CM

## 2020-11-30 DIAGNOSIS — H52.4 HYPEROPIA WITH REGULAR ASTIGMATISM AND PRESBYOPIA, BILATERAL: ICD-10-CM

## 2020-11-30 PROCEDURE — 92015 PR REFRACTION: ICD-10-PCS | Mod: S$GLB,,, | Performed by: OPTOMETRIST

## 2020-11-30 PROCEDURE — 2023F DILAT RTA XM W/O RTNOPTHY: CPT | Mod: S$GLB,,, | Performed by: OPTOMETRIST

## 2020-11-30 PROCEDURE — 92014 PR EYE EXAM, EST PATIENT,COMPREHESV: ICD-10-PCS | Mod: S$GLB,,, | Performed by: OPTOMETRIST

## 2020-11-30 PROCEDURE — 2023F PR DILATED RETINAL EXAM W/O EVID OF RETINOPATHY: ICD-10-PCS | Mod: S$GLB,,, | Performed by: OPTOMETRIST

## 2020-11-30 PROCEDURE — 92014 COMPRE OPH EXAM EST PT 1/>: CPT | Mod: S$GLB,,, | Performed by: OPTOMETRIST

## 2020-11-30 PROCEDURE — 99999 PR PBB SHADOW E&M-EST. PATIENT-LVL III: ICD-10-PCS | Mod: PBBFAC,,, | Performed by: OPTOMETRIST

## 2020-11-30 PROCEDURE — 1126F AMNT PAIN NOTED NONE PRSNT: CPT | Mod: S$GLB,,, | Performed by: OPTOMETRIST

## 2020-11-30 PROCEDURE — 92015 DETERMINE REFRACTIVE STATE: CPT | Mod: S$GLB,,, | Performed by: OPTOMETRIST

## 2020-11-30 PROCEDURE — 1126F PR PAIN SEVERITY QUANTIFIED, NO PAIN PRESENT: ICD-10-PCS | Mod: S$GLB,,, | Performed by: OPTOMETRIST

## 2020-11-30 PROCEDURE — 99999 PR PBB SHADOW E&M-EST. PATIENT-LVL III: CPT | Mod: PBBFAC,,, | Performed by: OPTOMETRIST

## 2020-11-30 RX ORDER — PREDNISONE 5 MG/1
5 TABLET ORAL DAILY
Qty: 30 TABLET | Refills: 11 | OUTPATIENT
Start: 2020-11-30

## 2020-11-30 RX ORDER — FLECAINIDE ACETATE 100 MG/1
100 TABLET ORAL EVERY 12 HOURS
Qty: 60 TABLET | Refills: 0 | Status: SHIPPED | OUTPATIENT
Start: 2020-11-30 | End: 2020-12-28 | Stop reason: SDUPTHER

## 2020-11-30 NOTE — TELEPHONE ENCOUNTER
Called and informed patient that the steroid was filled earlier this month by a different provider. Patient verbalized understanding.

## 2020-11-30 NOTE — PROGRESS NOTES
HPI     Annual Diabetic eye exam  Hemoglobin A1C       Date                     Value               Ref Range             Status                11/10/2020               7.6 (H)             4.0 - 5.6 %           Final                 09/11/2020               7.1 (H)             4.0 - 5.6 %           Final                  01/20/2020               7.1 (H)             4.0 - 5.6 %           Final                Forget glasses at home  (-)Flashes +-)Floaters  (-)Itch, (-)tear, (-)burn, (-)Dryness. (+) OTC Drops   (-)Photophobia  (--)Glare (-)diplopia (-) headaches          Last edited by DARREL Moya on 11/30/2020 10:26 AM. (History)            Assessment /Plan     For exam results, see Encounter Report.    Eye exam, routine  -No retinopathy noted today.  Continued control with primary care physician and annual comprehensive eye exam.  -Eyemed    Hyperopia with regular astigmatism and presbyopia, bilateral  Eyeglass Final Rx     Eyeglass Final Rx       Sphere Cylinder Axis Dist VA Add    Right +1.75 +0.50 005 20/20 +2.50    Left +1.25 +0.75 180 20/20 +2.50    Type: PAL    Expiration Date: 12/1/2021                  RTC 1 yr

## 2020-12-01 NOTE — PROGRESS NOTES
CHIEF COMPLAINT: Type 2 Diabetes     HPI: Mr. Ravi Zamorano is a 65 y.o. male who was diagnosed with Type 2 DM in 2000.  S/p kidney transplant 2016  Seen by me in the past via endocrinology.   Reports hyperglycemia in November- birthday month/Thanksgiving.   a1c went up from 7.1% to 7.6%   Past Medical History:   Diagnosis Date    Anxiety 7/29/2014    Arthritis     Bilateral diabetic retinopathy 2017    CKD (chronic kidney disease) stage 2, GFR 60-89 ml/min 12/28/2016    CKD (chronic kidney disease) stage 4, GFR 15-29 ml/min 7/29/2014    Colon polyps 2014    Depression - situational 7/29/2014    Diabetes mellitus     Diabetes type 2 since 2000 7/29/2014    Diabetic neuropathy 7/29/2014    History of cardioversion 10/3/2019    History of hepatitis C, s/p successful Rx w/ [id number] - 9/2017 7/29/2014    Genotype 1a, treatment naive 10/2014 liver biopsy - grade 1 / stage 1 Completed Harvoni w/ SVR    Hyperlipidemia 7/29/2014    Hypertension     Neuropathy     Organ transplant candidate 7/29/2014    Pancreatitis     S/P cadaveric kidney transplant 11/5/2016. ESRD secondary to HTN/DMII 11/5/2016     Remains on MDI---lantus and humalog     PREVIOUS DIABETES MEDICATIONS TRIED  lantus  humalog  novolog  levemir  trulicity   tradjenta  Metformin       CURRENT DIABETIC MEDS: lantus 35 units at night,   humalog 18-16-16 units ac w/ scale 150-200+2, etc.    On MDI (injections 4 x a day)   Makes frequent changes to his/her insulin regimen on the basis of blood glucose data  Testing 4 x a day max   Patient is willing and able to use the device  Demonstrated an understanding of the technology and is motivated to use CGM  Patient expected to adhere to a comprehensive diabetes treatment plan and patient has adequate medical supervision    Has multiple impaired awareness of hypoglycemia (hypoglycemia unawareness)    Highest: 292  Lowest: 95  Fasting varies     Avoid fast foods  Left overs, cooks home made denys  "chicken wings, fries-ninja, pizza in oven, red beans/rice  Veggies- here and there   3 meals a day     Diabetes Management Status    Statin: Taking  ACE/ARB: Taking    Screening or Prevention Patient's value Goal Complete/Controlled?   HgA1C Testing and Control   Lab Results   Component Value Date    HGBA1C 7.6 (H) 11/10/2020      Annually/Less than 8% Yes   Lipid profile : 11/10/2020 Annually Yes   LDL control Lab Results   Component Value Date    LDLCALC 99.8 11/10/2020    Annually/Less than 100 mg/dl  Yes   Nephropathy screening No results found for: LABMICR  Lab Results   Component Value Date    PROTEINUA 2+ (A) 11/10/2020    Annually Yes   Blood pressure BP Readings from Last 1 Encounters:   12/02/20 (!) 158/84    Less than 140/90 No   Dilated retinal exam : 11/30/2020 Annually Yes   Foot exam   : 12/02/2020 Annually No     REVIEW OF SYSTEMS  General: no weakness, fatigue, + weight changes 2-3#   Eyes: no double or blurred vision, eye pain, or redness  Cardiovascular: no chest pain, palpitations, + ankle and hand edema, or murmurs.   Respiratory: no cough or dyspnea.   GI: no heartburn, nausea, or changes in bowel patterns; good appetite.   Skin: no rashes, dryness, itching, or reactions at insulin injection sites.  Neuro: + numbness, tingling-gabapentin, tremors, or vertigo.   Endocrine: no polyuria, polydipsia, polyphagia, heat or cold intolerance.     Vital Signs  BP (!) 154/78 (BP Location: Left arm, Patient Position: Sitting, BP Method: Large (Manual))   Pulse (!) 52   Temp 98 °F (36.7 °C) (Oral)   Resp 18   Ht 6' 1" (1.854 m)   Wt 96.6 kg (212 lb 15.4 oz)   SpO2 98%   BMI 28.10 kg/m²     Hemoglobin A1C   Date Value Ref Range Status   11/10/2020 7.6 (H) 4.0 - 5.6 % Final     Comment:     ADA Screening Guidelines:  5.7-6.4%  Consistent with prediabetes  >or=6.5%  Consistent with diabetes  High levels of fetal hemoglobin interfere with the HbA1C  assay. Heterozygous hemoglobin variants (HbS, HgC, " etc)do  not significantly interfere with this assay.   However, presence of multiple variants may affect accuracy.     09/11/2020 7.1 (H) 4.0 - 5.6 % Final     Comment:     ADA Screening Guidelines:  5.7-6.4%  Consistent with prediabetes  >or=6.5%  Consistent with diabetes  High levels of fetal hemoglobin interfere with the HbA1C  assay. Heterozygous hemoglobin variants (HbS, HgC, etc)do  not significantly interfere with this assay.   However, presence of multiple variants may affect accuracy.     01/20/2020 7.1 (H) 4.0 - 5.6 % Final     Comment:     ADA Screening Guidelines:  5.7-6.4%  Consistent with prediabetes  >or=6.5%  Consistent with diabetes  High levels of fetal hemoglobin interfere with the HbA1C  assay. Heterozygous hemoglobin variants (HbS, HgC, etc)do  not significantly interfere with this assay.   However, presence of multiple variants may affect accuracy.          Chemistry        Component Value Date/Time     11/10/2020 0740    K 4.4 11/10/2020 0740     11/10/2020 0740    CO2 30 (H) 11/10/2020 0740    BUN 11 11/10/2020 0740    CREATININE 1.1 11/10/2020 0740     (H) 11/10/2020 0740        Component Value Date/Time    CALCIUM 9.0 11/10/2020 0740    ALKPHOS 74 11/10/2020 0740    AST 13 11/10/2020 0740    ALT <5 (L) 11/10/2020 0740    BILITOT 0.4 11/10/2020 0740    ESTGFRAFRICA >60.0 11/10/2020 0740    EGFRNONAA >60.0 11/10/2020 0740           Lab Results   Component Value Date    TSH 1.143 03/15/2019      Lab Results   Component Value Date    CHOL 172 11/10/2020    CHOL 151 10/23/2019    CHOL 161 08/01/2019     Lab Results   Component Value Date    HDL 48 11/10/2020    HDL 37 (L) 10/23/2019    HDL 37 (L) 08/01/2019     Lab Results   Component Value Date    LDLCALC 99.8 11/10/2020    LDLCALC 84.8 10/23/2019    LDLCALC 99.6 08/01/2019     Lab Results   Component Value Date    TRIG 121 11/10/2020    TRIG 146 10/23/2019    TRIG 122 08/01/2019     Lab Results   Component Value Date     CHOLHDL 27.9 11/10/2020    CHOLHDL 24.5 10/23/2019    CHOLHDL 23.0 08/01/2019         PHYSICAL EXAMINATION  Constitutional: Appears well, no distress. Reviewed vitals above.  Eyes: conjunctivae & lids intact; PERRLA, EOMs intact; optic discs   Neck: Supple, trachea midline.   Respiratory: CTA without wheezes, even and unlabored  Cardiovascular: RRR; no carotid bruits or murmurs; (+) DP pulses; +ankle, hand(s) edema or varicosities  Lymph: deferred   Skin: warm and dry; no injection site reactions, no acanthosis nigracans observed.  Neuro:patient alert and cooperative, normal affect; steady gait.  Psychiatric: judgement & insight intact, orientation of time, place & person intact, memory; mood & affect wnl     Diabetes Foot Exam:   Protective Sensation (w/ 10 gram monofilament):  Right: Intact 5/6 -decreased for heel  Left: Intact  5/6 -same  Vibratory sensation intact to both    Visual Inspection:  Normal -  Bilateral, Callus -  Bilateral and Dry Skin -  Bilateral    Pedal Pulses:   Right: Present  Left: Present    Posterior tibialis:   Right:Present  Left: Present      Assessment/Plan    1. Type 2 diabetes mellitus with both eyes affected by moderate nonproliferative retinopathy without macular edema, without long-term current use of insulin  Hemoglobin A1C   2. Type 2 diabetes mellitus with stage 2 chronic kidney disease, with long-term current use of insulin  Hemoglobin A1C   3. Prophylactic immunotherapy     4. Persistent atrial fibrillation     5. Essential hypertension     6. Diabetic polyneuropathy associated with type 2 diabetes mellitus     7. Chronic diastolic congestive heart failure     8. Adverse effect of glucocorticoid or synthetic analogue, subsequent encounter       1.-2. F/u in 4 mos w/ me   a1c prior   a1c goal less than 7%  ozempic 0.25 mg weekly week 1-4 -then increase to 0.5 mg weekly  No h/o pancreatitis or medullary thyroid ca  Continue regimen above at this time   Decline cgm at this  time  Discussed dietary habits  Daxa book -carbs   3. Managed per [facility]  4. F/u with cards   5. Continue med(s)  Repeat bp  Elevated during visit   6. On gabapentin- usually taking qhs dose  Skipping daily  Declined podiatry at this time  7. See above  8. On stable pred  Managed per [facility]        FOLLOW UP  No follow-ups on file.     Time: 30 mins

## 2020-12-02 ENCOUNTER — OFFICE VISIT (OUTPATIENT)
Dept: INTERNAL MEDICINE | Facility: CLINIC | Age: 65
End: 2020-12-02
Payer: COMMERCIAL

## 2020-12-02 VITALS
HEIGHT: 73 IN | OXYGEN SATURATION: 98 % | BODY MASS INDEX: 28.22 KG/M2 | SYSTOLIC BLOOD PRESSURE: 154 MMHG | WEIGHT: 212.94 LBS | HEART RATE: 52 BPM | DIASTOLIC BLOOD PRESSURE: 78 MMHG | RESPIRATION RATE: 18 BRPM | TEMPERATURE: 98 F

## 2020-12-02 DIAGNOSIS — E11.22 TYPE 2 DIABETES MELLITUS WITH STAGE 2 CHRONIC KIDNEY DISEASE, WITH LONG-TERM CURRENT USE OF INSULIN: ICD-10-CM

## 2020-12-02 DIAGNOSIS — Z79.4 TYPE 2 DIABETES MELLITUS WITH STAGE 2 CHRONIC KIDNEY DISEASE, WITH LONG-TERM CURRENT USE OF INSULIN: ICD-10-CM

## 2020-12-02 DIAGNOSIS — E11.3393 TYPE 2 DIABETES MELLITUS WITH BOTH EYES AFFECTED BY MODERATE NONPROLIFERATIVE RETINOPATHY WITHOUT MACULAR EDEMA, WITHOUT LONG-TERM CURRENT USE OF INSULIN: Primary | ICD-10-CM

## 2020-12-02 DIAGNOSIS — T38.0X5D ADVERSE EFFECT OF GLUCOCORTICOID OR SYNTHETIC ANALOGUE, SUBSEQUENT ENCOUNTER: ICD-10-CM

## 2020-12-02 DIAGNOSIS — I10 ESSENTIAL HYPERTENSION: ICD-10-CM

## 2020-12-02 DIAGNOSIS — I48.19 PERSISTENT ATRIAL FIBRILLATION: ICD-10-CM

## 2020-12-02 DIAGNOSIS — Z29.89 PROPHYLACTIC IMMUNOTHERAPY: ICD-10-CM

## 2020-12-02 DIAGNOSIS — I50.32 CHRONIC DIASTOLIC CONGESTIVE HEART FAILURE: ICD-10-CM

## 2020-12-02 DIAGNOSIS — E11.42 DIABETIC POLYNEUROPATHY ASSOCIATED WITH TYPE 2 DIABETES MELLITUS: ICD-10-CM

## 2020-12-02 DIAGNOSIS — N18.2 TYPE 2 DIABETES MELLITUS WITH STAGE 2 CHRONIC KIDNEY DISEASE, WITH LONG-TERM CURRENT USE OF INSULIN: ICD-10-CM

## 2020-12-02 PROCEDURE — 3078F DIAST BP <80 MM HG: CPT | Mod: CPTII,S$GLB,, | Performed by: NURSE PRACTITIONER

## 2020-12-02 PROCEDURE — 3051F HG A1C>EQUAL 7.0%<8.0%: CPT | Mod: CPTII,S$GLB,, | Performed by: NURSE PRACTITIONER

## 2020-12-02 PROCEDURE — 3077F PR MOST RECENT SYSTOLIC BLOOD PRESSURE >= 140 MM HG: ICD-10-PCS | Mod: CPTII,S$GLB,, | Performed by: NURSE PRACTITIONER

## 2020-12-02 PROCEDURE — 99999 PR PBB SHADOW E&M-EST. PATIENT-LVL V: CPT | Mod: PBBFAC,,, | Performed by: NURSE PRACTITIONER

## 2020-12-02 PROCEDURE — 3008F PR BODY MASS INDEX (BMI) DOCUMENTED: ICD-10-PCS | Mod: CPTII,S$GLB,, | Performed by: NURSE PRACTITIONER

## 2020-12-02 PROCEDURE — 3077F SYST BP >= 140 MM HG: CPT | Mod: CPTII,S$GLB,, | Performed by: NURSE PRACTITIONER

## 2020-12-02 PROCEDURE — 99214 OFFICE O/P EST MOD 30 MIN: CPT | Mod: S$GLB,,, | Performed by: NURSE PRACTITIONER

## 2020-12-02 PROCEDURE — 3078F PR MOST RECENT DIASTOLIC BLOOD PRESSURE < 80 MM HG: ICD-10-PCS | Mod: CPTII,S$GLB,, | Performed by: NURSE PRACTITIONER

## 2020-12-02 PROCEDURE — 99999 PR PBB SHADOW E&M-EST. PATIENT-LVL V: ICD-10-PCS | Mod: PBBFAC,,, | Performed by: NURSE PRACTITIONER

## 2020-12-02 PROCEDURE — 3008F BODY MASS INDEX DOCD: CPT | Mod: CPTII,S$GLB,, | Performed by: NURSE PRACTITIONER

## 2020-12-02 PROCEDURE — 1126F AMNT PAIN NOTED NONE PRSNT: CPT | Mod: S$GLB,,, | Performed by: NURSE PRACTITIONER

## 2020-12-02 PROCEDURE — 3072F LOW RISK FOR RETINOPATHY: CPT | Mod: S$GLB,,, | Performed by: NURSE PRACTITIONER

## 2020-12-02 PROCEDURE — 1126F PR PAIN SEVERITY QUANTIFIED, NO PAIN PRESENT: ICD-10-PCS | Mod: S$GLB,,, | Performed by: NURSE PRACTITIONER

## 2020-12-02 PROCEDURE — 3051F PR MOST RECENT HEMOGLOBIN A1C LEVEL 7.0 - < 8.0%: ICD-10-PCS | Mod: CPTII,S$GLB,, | Performed by: NURSE PRACTITIONER

## 2020-12-02 PROCEDURE — 3072F PR LOW RISK FOR RETINOPATHY: ICD-10-PCS | Mod: S$GLB,,, | Performed by: NURSE PRACTITIONER

## 2020-12-02 PROCEDURE — 99214 PR OFFICE/OUTPT VISIT, EST, LEVL IV, 30-39 MIN: ICD-10-PCS | Mod: S$GLB,,, | Performed by: NURSE PRACTITIONER

## 2020-12-02 RX ORDER — SEMAGLUTIDE 1.34 MG/ML
INJECTION, SOLUTION SUBCUTANEOUS
Qty: 3 SYRINGE | Refills: 3 | Status: SHIPPED | OUTPATIENT
Start: 2020-12-02 | End: 2021-06-16

## 2020-12-02 NOTE — PATIENT INSTRUCTIONS
Managing Diabetes: The A1C Test       Healthy red blood cells have some glucose stuck to them. A high A1C means that unhealthy amounts of glucose are stuck to the cells.   What is the A1C test?  Using your meter helps you track your blood sugar every day. But your glucose meter tells you the value at the time of testing only. You also need to know if your treatment plan is keeping you healthy over time. The hemoglobin A1C (or glycated hemoglobin) test can help. This test measures your average blood sugar level over a few months. A higher A1C result means that you have a higher risk of developing complications.  The A1C test  The A1C is a blood test done by your healthcare provider. You will likely have an A1C test every 3 to 6 months.  Your blood glucose goal  A1C has been shown as a percentage. But it can also be shown as a number representing the estimated Average Glucose (eAG). Unlike the A1C percentage, eAG is a number similar to the numbers listed on your daily glucose monitor. Both A1C and eAG measure the amount of glucose stuck to a protein called hemoglobin in red blood cells. Your healthcare provider will help you figure out what your ideal A1C or eAG should be. Your target number will depend on your age, general health, and other factors. If your current number is too high, your treatment plan may need changes, such as different medicines.  Sample results  Most people aim for an A1c lower than 7%. Thats an eAG less than 154 mg/dL. Or, your healthcare provider may want you to aim for an A1C of 6%. Thats an eAG of 126 mg/dL.     Glucose calculator  Visit http://professional.diabetes.org/diapro/glucose_calc for a chart that helps convert your A1C percentages into eAG numbers.   Date Last Reviewed: 6/1/2016  © 1837-4972 [employer]. [address] [place] [address], [place] [address]. All rights reserved. This information is not intended as a substitute for professional medical care. Always follow your  healthcare professional's instructions.        Hypoglycemia (Low Blood Sugar)     Fast-acting sugar includes a cup of nonfat milk.     Too little sugar (glucose) in your blood is called hypoglycemia or low blood sugar. Low blood sugar usually means anything lower than 70 mg/dL. Talk with your healthcare provider about your target range and what level is too low for you. Diabetes itself doesnt cause low blood sugar. But some of the treatments for diabetes, such as pills or insulin, may raise your risk for it. Low blood sugar may cause you to pass out or have a seizure. So always treat low blood sugar right away, but don't overeat.  Special note: Always carry a source of fast-acting sugar and a snack in case of hypoglycemia.   What you may notice  If you have low blood sugar, you may have one or more of these symptoms:  · Shakiness or dizziness  · Cold, clammy skin or sweating  · Feelings of hunger  · Headache  · Nervousness  · A hard, fast heartbeat  · Weakness  · Confusion or irritability  · Blurred vision  · Having nightmares or waking up confused or sweating  · Numbness or tingling in the lips or tongue  What you should do  Here are tips to follow if you have hypoglycemia:   · First check your blood sugar. If it is too low (out of your target range), eat or drink 15 to 20 grams of fast-acting sugar. This may be 3 to 4 glucose tablets, 4 ounces (half a cup) of fruit juice or regular (nondiet) soda, 8 ounces (1 cup) of fat-free milk, or 1 tablespoon of honey. Dont take more than this, or your blood sugar may go too high.  · Wait 15 minutes. Then recheck your blood sugar if you can.  · If your blood sugar is still too low, repeat the steps above and check your blood sugar again. If your blood sugar still has not returned to your target range, contact your healthcare provider or seek emergency care.  · Once your blood sugar returns to target range, eat a snack or meal.  Preventing low blood sugar  Things you can do  include the following:   · If your condition needs a strict treatment plan, eat your meals and snacks at the same times each day. Dont skip meals!  · If your treatment plan lets you change when you eat and what you eat, learn how to change the time and dose of your rapid-acting insulin to match this.   · Ask your healthcare provider if it is safe for you to drink alcohol. Never drink on an empty stomach.  · Take your medicine at the prescribed times.  · Always carry a source of fast-acting sugar and a snack when youre away from home.  Other things to do  Additional tips include the following:  · Carry a medical ID card, a compact USB drive, or wear a medical alert bracelet or necklace. It should say that you have diabetes. It should also say what to do if you pass out or have a seizure.  · Make sure your family, friends, and coworkers know the signs of low blood sugar. Tell them what to do if your blood sugar falls very low and you cant treat yourself.  · Keep a glucagon emergency kit handy. Be sure your family, friends, and coworkers know how and when to use it. Check it regularly and replace the glucagon before it expires.  · Talk with your health care team about other things you can do to prevent low blood sugar.     If you have unexplained hypoglycemia or hypoglycemia several times, call your healthcare provider.   Date Last Reviewed: 5/1/2016  © 3911-7394 [employer]. [address] [place] [address]. All rights reserved. This information is not intended as a substitute for professional medical care. Always follow your healthcare professional's instructions.        Exercise: Adding Intensity  You have been exercising for 30 minutes most days of the week. Now you can move on to the next stage: increasing the intensity. This means doing your activity in one or more of these ways:  · Longer. Exercise for 30 minutes or more without a break.  · Faster. Hike, run, or skate fast enough to  raise your heart rate moderately--as if you had walked fast to catch a bus.  · More often. Do your activity 4 to 6 times a week instead of 1 to 3 times.    Not just gym class  Be creative. You can reach your health and fitness goals in many ways. Try some of these activities:  · Team sports, like basketball or soccer  · Social or recreational activities, like hiking or dancing  · Individual exercise, like cycling, swimming, or skating  · Group fitness classes, like aerobic classes or weight training  Safety first  Whatever activity you choose, think about safety:  · Wear the right safety gear and shoes for your activity.  · Drink plenty of water during and after workouts.  · Wear light-colored clothing if youre out when its dark.  · Make time to warm up before you exercise and cool down after.  · Carry ID (identification) with you if youre out alone. And be sure someone knows where youre going.  · If youre on foot, travel against traffic (except on blind corners). If youre on a bike, go with traffic. Obey the rules of the road.   Tips for sticking with it  · Find a workout partner or sports club. If you know someone is expecting you, youll be less likely to skip your workout.  · Pack a workout bag with everything you need. Then its ready when you are.  · Choose a few different activities so youll stay interested. Make it fun!  What will help you to stick with it?  1.   2.   3.   Date Last Reviewed: 8/13/2015  © 2178-2193 [employer]. [address] [place] [address], [place] [address]. All rights reserved. This information is not intended as a substitute for professional medical care. Always follow your healthcare professional's instructions.        Diabetes: Sick-Day Plan  Infections, the flu, and even a cold, can cause your blood sugar to rise. And, eating less, nausea, and vomiting may cause your blood glucose to fall (hypoglycemia). Ask your healthcare provider to help you develop a sick-day plan.  The following information can help.    Donts  Don'ts include the following:  · Diabetes medicines. Dont stop taking your diabetes medicine.  · Other medicines. Dont take other medicines, such as those for colds or the flu, without checking with your healthcare provider.  Dos  Do's include the following:  · Eating. Stick to your meal plan. If you cant eat, try fruit juice, regular gelatin, or frozen juice bars as directed by your healthcare provider.  · Drinking. Drink at least 1 glass of liquid every hour. If youre eating, these liquids should be sugar-free.  · Blood glucose. Check your blood sugar as often as directed by your healthcare provider. You may need to check it more often than usual.  · Ketones. Check your blood or urine for ketones. Ketones are the waste from burning fat instead of glucose for energy. Ketones are a warning sign of ketoacidosis. Ketoacidosis is a medical emergency. Ketoacidosis can happen to anyone with diabetes, but it's very rare in type 2 diabetes. It is usually only an issue if you have type 1 diabetes.  · Diabetes medicines.  ¨ Adjust your insulin according to your sick-day plan. Don't skip insulin. You need insulin even if you can't eat your normal meals.  ¨ If you take pills for diabetes (oral medicines), take your normal dose unless your healthcare provider tells you something different.  · Sugar-free medicines. Look for sugar-free cough drops and other medicines. Ask your healthcare provider if its OK for you to take these.  · Getting help. If you're alone, ask someone to check on you several times a day.  Try to get all these supplies together before you need them.  Call your healthcare provider  Call your healthcare provider if you have any of the following:  · You vomit or have diarrhea for more than 6 hours.  · Your blood glucose level is higher than usual or more than 250 mg/dL after you have taken extra insulin (if recommended in your sick-day plan).  · You take oral  medicine for diabetes, and your blood sugar is higher than usual or over 250 mg/dL, before a meal and stays that high for more than 24 hours.  · Your blood glucose is lower than usual or less than 70 mg/dL  · You have moderate to large amounts of ketones in your blood or urine.  · You arent better after 2 days.  Date Last Reviewed: 6/1/2016  © 1589-5025 [employer]. [address] [place] [address], [place] [address]. All rights reserved. This information is not intended as a substitute for professional medical care. Always follow your healthcare professional's instructions.        Low-Salt Diet  This diet removes foods that are high in salt. It also limits the amount of salt you use when cooking. It is most often used for people with high blood pressure, edema (fluid retention), and kidney, liver, or heart disease.  Table salt contains the mineral sodium. Your body needs sodium to work normally. But too much sodium can make your health problems worse. Your healthcare provider is recommending a low-salt (also called low-sodium) diet for you. Your total daily allowance of salt is 1,500 to 2,300 milligrams (mg). It is less than 1 teaspoon of table salt. This means you can have only about 500 to 700 mg of sodium at each meal. People with certain health problems should limit salt intake to the lower end of the recommended range.    When you cook, dont add much salt. If you can cook without using salt, even better. Dont add salt to your food at the table.  When shopping, read food labels. Salt is often called sodium on the label. Choose foods that are salt-free, low salt, or very low salt. Note that foods with reduced salt may not lower your salt intake enough.    Beans, potatoes, and pasta  Ok: Dry beans, split peas, lentils, potatoes, rice, macaroni, pasta, spaghetti without added salt  Avoid: Potato chips, tortilla chips, and similar products  Breads and cereals  Ok: Low-sodium breads, rolls, cereals, and  cakes; low-salt crackers, matzo crackers  Avoid: Salted crackers, pretzels, popcorn, Bel[place]ian toast, pancakes, muffins  Dairy  Ok: Milk, chocolate milk, hot chocolate mix, low-salt cheeses, and yogurt  Avoid: Processed cheese and cheese spreads; Roquefort, Camembert, and cottage cheese; buttermilk, instant breakfast drink  Desserts  Ok: Ice cream, frozen yogurt, juice bars, gelatin, cookies and pies, sugar, honey, jelly, hard candy  Avoid: Most pies, cakes and cookies prepared or processed with salt; instant pudding  Drinks  Ok: Tea, coffee, fizzy (carbonated) drinks, juices  Avoid: Flavored coffees, electrolyte replacement drinks, sports drinks  Meats  Ok: All fresh meat, fish, poultry, low-salt tuna, eggs, egg substitute  Avoid: Smoked, pickled, brine-cured, or salted meats and fish. This includes hilton, chipped beef, corned beef, hot dogs, deli meats, ham, kosher meats, salt pork, sausage, canned tuna, salted codfish, smoked salmon, herring, sardines, or anchovies.  Seasonings and spices  Ok: Most seasonings are okay. Good substitutes for salt include: fresh herb blends, hot sauce, lemon, garlic, quinteros, vinegar, dry mustard, parsley, cilantro, horseradish, tomato paste, regular margarine, mayonnaise, unsalted butter, cream cheese, vegetable oil, cream, low-salt salad dressing and gravy.  Avoid: Regular ketchup, relishes, pickles, soy sauce, teriyaki sauce, Worcestershire sauce, BBQ sauce, tartar sauce, meat tenderizer, chili sauce, regular gravy, regular salad dressing, salted butter  Soups  Ok: Low-salt soups and broths made with allowed foods  Avoid: Bouillon cubes, soups with smoked or salted meats, regular soup and broth  Vegetables  Ok: Most vegetables are okay; also low-salt tomato and vegetable juices  Avoid: Sauerkraut and other brine-soaked vegetables; pickles and other pickled vegetables; tomato juice, olives  Date Last Reviewed: 8/1/2016  © 3957-7505 [employer]. [address] [place] [address],  [place] [address]. All rights reserved. This information is not intended as a substitute for professional medical care. Always follow your healthcare professional's instructions.

## 2020-12-15 ENCOUNTER — TELEPHONE (OUTPATIENT)
Dept: UROLOGY | Facility: CLINIC | Age: 65
End: 2020-12-15

## 2020-12-15 NOTE — TELEPHONE ENCOUNTER
Pt was notified that his pre op appt was moved from Wed Jan 27th to Monday Jan 25th, pt agreed and verbally understood.

## 2020-12-21 ENCOUNTER — ANESTHESIA (OUTPATIENT)
Dept: ENDOSCOPY | Facility: HOSPITAL | Age: 65
End: 2020-12-21
Payer: COMMERCIAL

## 2020-12-21 ENCOUNTER — ANESTHESIA EVENT (OUTPATIENT)
Dept: ENDOSCOPY | Facility: HOSPITAL | Age: 65
End: 2020-12-21
Payer: COMMERCIAL

## 2020-12-21 ENCOUNTER — HOSPITAL ENCOUNTER (OUTPATIENT)
Facility: HOSPITAL | Age: 65
Discharge: HOME | End: 2020-12-21
Attending: INTERNAL MEDICINE | Admitting: INTERNAL MEDICINE
Payer: COMMERCIAL

## 2020-12-21 VITALS
BODY MASS INDEX: 27.83 KG/M2 | RESPIRATION RATE: 18 BRPM | TEMPERATURE: 98 F | SYSTOLIC BLOOD PRESSURE: 209 MMHG | HEIGHT: 73 IN | WEIGHT: 210 LBS | HEART RATE: 58 BPM | DIASTOLIC BLOOD PRESSURE: 95 MMHG | OXYGEN SATURATION: 100 %

## 2020-12-21 DIAGNOSIS — Z86.010 HISTORY OF COLON POLYPS: ICD-10-CM

## 2020-12-21 PROBLEM — Z86.0100 HISTORY OF COLON POLYPS: Status: ACTIVE | Noted: 2020-12-21

## 2020-12-21 LAB — POCT GLUCOSE: 148 MG/DL (ref 70–110)

## 2020-12-21 PROCEDURE — 37000009 HC ANESTHESIA EA ADD 15 MINS: Performed by: INTERNAL MEDICINE

## 2020-12-21 PROCEDURE — E9220 PRA ENDO ANESTHESIA: ICD-10-PCS | Mod: ,,, | Performed by: NURSE ANESTHETIST, CERTIFIED REGISTERED

## 2020-12-21 PROCEDURE — 82962 GLUCOSE BLOOD TEST: CPT | Performed by: INTERNAL MEDICINE

## 2020-12-21 PROCEDURE — 25000003 PHARM REV CODE 250: Performed by: INTERNAL MEDICINE

## 2020-12-21 PROCEDURE — G0105 COLORECTAL SCRN; HI RISK IND: HCPCS | Performed by: INTERNAL MEDICINE

## 2020-12-21 PROCEDURE — 63600175 PHARM REV CODE 636 W HCPCS: Performed by: NURSE ANESTHETIST, CERTIFIED REGISTERED

## 2020-12-21 PROCEDURE — G0105 COLORECTAL SCRN; HI RISK IND: HCPCS | Mod: ,,, | Performed by: INTERNAL MEDICINE

## 2020-12-21 PROCEDURE — 37000008 HC ANESTHESIA 1ST 15 MINUTES: Performed by: INTERNAL MEDICINE

## 2020-12-21 PROCEDURE — E9220 PRA ENDO ANESTHESIA: HCPCS | Mod: ,,, | Performed by: NURSE ANESTHETIST, CERTIFIED REGISTERED

## 2020-12-21 PROCEDURE — G0105 COLORECTAL SCRN; HI RISK IND: ICD-10-PCS | Mod: ,,, | Performed by: INTERNAL MEDICINE

## 2020-12-21 RX ORDER — PROPOFOL 10 MG/ML
VIAL (ML) INTRAVENOUS
Status: DISCONTINUED | OUTPATIENT
Start: 2020-12-21 | End: 2020-12-21

## 2020-12-21 RX ORDER — SODIUM CHLORIDE 9 MG/ML
INJECTION, SOLUTION INTRAVENOUS CONTINUOUS
Status: DISCONTINUED | OUTPATIENT
Start: 2020-12-21 | End: 2020-12-21 | Stop reason: HOSPADM

## 2020-12-21 RX ORDER — PROPOFOL 10 MG/ML
VIAL (ML) INTRAVENOUS CONTINUOUS PRN
Status: DISCONTINUED | OUTPATIENT
Start: 2020-12-21 | End: 2020-12-21

## 2020-12-21 RX ORDER — LIDOCAINE HCL/PF 100 MG/5ML
SYRINGE (ML) INTRAVENOUS
Status: DISCONTINUED | OUTPATIENT
Start: 2020-12-21 | End: 2020-12-21

## 2020-12-21 RX ADMIN — PROPOFOL 80 MG: 10 INJECTION, EMULSION INTRAVENOUS at 08:12

## 2020-12-21 RX ADMIN — SODIUM CHLORIDE 10 ML/HR: 0.9 INJECTION, SOLUTION INTRAVENOUS at 07:12

## 2020-12-21 RX ADMIN — Medication 40 MG: at 08:12

## 2020-12-21 RX ADMIN — PROPOFOL 175 MCG/KG/MIN: 10 INJECTION, EMULSION INTRAVENOUS at 08:12

## 2020-12-21 NOTE — PROVATION PATIENT INSTRUCTIONS
Discharge Summary/Instructions after an Endoscopic Procedure  Patient Name: Ravi Zamorano  Patient MRN: [id number]  Patient YOB: 1955 Monday, December 21, 2020  Tico Bell MD  RESTRICTIONS:  During your procedure today, you received medications for sedation.  These   medications may affect your judgment, balance and coordination.  Therefore,   for 24 hours, you have the following restrictions:   - DO NOT drive a car, operate machinery, make legal/financial decisions,   sign important papers or drink alcohol.    ACTIVITY:  Today: no heavy lifting, straining or running due to procedural   sedation/anesthesia.  The following day: return to full activity including work.  DIET:  Eat and drink normally unless instructed otherwise.     TREATMENT FOR COMMON SIDE EFFECTS:  - Mild abdominal pain, nausea, belching, bloating or excessive gas:  rest,   eat lightly and use a heating pad.  - Sore Throat: treat with throat lozenges and/or gargle with warm salt   water.  - Because air was used during the procedure, expelling large amounts of air   from your rectum or belching is normal.  - If a bowel prep was taken, you may not have a bowel movement for 1-3 days.    This is normal.  SYMPTOMS TO WATCH FOR AND REPORT TO YOUR PHYSICIAN:  1. Abdominal pain or bloating, other than gas cramps.  2. Chest pain.  3. Back pain.  4. Signs of infection such as: chills or fever occurring within 24 hours   after the procedure.  5. Rectal bleeding, which would show as bright red, maroon, or black stools.   (A tablespoon of blood from the rectum is not serious, especially if   hemorrhoids are present.)  6. Vomiting.  7. Weakness or dizziness.  GO DIRECTLY TO THE NEAREST EMERGENCY ROOM IF YOU HAVE ANY OF THE FOLLOWING:      Difficulty breathing              Chills and/or fever over 101 F   Persistent vomiting and/or vomiting blood   Severe abdominal pain   Severe chest pain   Black, tarry stools   Bleeding- more than one  tablespoon   Any other symptom or condition that you feel may need urgent attention  Your doctor recommends these additional instructions:  If any biopsies were taken, your doctors clinic will contact you in 1 to 2   weeks with any results.  - Discharge patient to home.   - Resume previous diet.   - Continue present medications.   - Repeat colonoscopy in 5 years for surveillance.   - Resume Eliquis (apixaban) at prior dose today.  For questions, problems or results please call your physician - Tico Bell MD at Work:  [phone].  [facility], EMERGENCY ROOM PHONE NUMBER: [phone]  IF A COMPLICATION OR EMERGENCY SITUATION ARISES AND YOU ARE UNABLE TO REACH   YOUR PHYSICIAN - GO DIRECTLY TO THE EMERGENCY ROOM.  Tico Bell MD  12/21/2020 8:44:51 AM  This report has been verified and signed electronically.  [employer]

## 2020-12-21 NOTE — ANESTHESIA POSTPROCEDURE EVALUATION
Anesthesia Post Evaluation    Patient: Ravi Zamorano    Procedure(s) Performed: Procedure(s) (LRB):  COLONOSCOPY (N/A)    Final Anesthesia Type: general      Patient location during evaluation: PACU  Patient participation: Yes- Able to Participate  Level of consciousness: awake and alert  Post-procedure vital signs: reviewed and stable  Pain management: adequate  Airway patency: patent    PONV status at discharge: No PONV  Anesthetic complications: no      Cardiovascular status: hemodynamically stable  Respiratory status: spontaneous ventilation  Follow-up not needed.          Vitals Value Taken Time   /71 12/21/20 0849   Temp 36.8 °C (98.2 °F) 12/21/20 0849   Pulse 59 12/21/20 0849   Resp 18 12/21/20 0849   SpO2 99 % 12/21/20 0849         No case tracking events are documented in the log.      Pain/Gurmeet Score: No data recorded

## 2020-12-21 NOTE — H&P
Short Stay Endoscopy History and Physical    PCP - Mima Mack MD    Procedure - Colonoscopy  ASA - per anesthesia  Mallampati - per anesthesia  History of Anesthesia problems - no  Family history Anesthesia problems - no   Plan of anesthesia - General    HPI:  This is a 65 y.o. male here for evaluation of : personal history of colon polyps    Last colonoscopy 2014 with 16mm transverse polyp (TV) and 4mm rectal polyp (TV)    No complaints on review, feeling well. No Fhx of CRC.    On eliquis (last dose Saturday) and ASA  Renal transplant (2016)      ROS:  Constitutional: No fevers, chills, No weight loss  CV: No chest pain  Pulm: No cough, No shortness of breath  GI: see HPI  Derm: No rash    Medical History:  has a past medical history of Anxiety (7/29/2014), Arthritis, Bilateral diabetic retinopathy (2017), CKD (chronic kidney disease) stage 2, GFR 60-89 ml/min (12/28/2016), CKD (chronic kidney disease) stage 4, GFR 15-29 ml/min (7/29/2014), Colon polyps (2014), Depression - situational (7/29/2014), Diabetes mellitus, Diabetes type 2 since 2000 (7/29/2014), Diabetic neuropathy (7/29/2014), History of cardioversion (10/3/2019), History of hepatitis C, s/p successful Rx w/ SVR24 - 9/2017 (7/29/2014), Hyperlipidemia (7/29/2014), Hypertension, Neuropathy, Organ transplant candidate (7/29/2014), Pancreatitis, and S/P cadaveric kidney transplant 11/5/2016. ESRD secondary to HTN/DMII (11/5/2016).    Surgical History:  has a past surgical history that includes Appendectomy; Kidney transplant; Treatment of cardiac arrhythmia (N/A, 8/26/2019); and Transesophageal echocardiography (N/A, 8/26/2019).    Family History: family history includes Cancer in his brother; Diabetes in his mother; Heart disease in his father and mother; Hypertension in his brother and mother.. Otherwise no colon cancer, inflammatory bowel disease, or GI malignancies.    Social History:  reports that he quit smoking about 4 years ago. His  "smoking use included cigarettes. He has a 16.00 pack-year smoking history. He has never used smokeless tobacco. He reports current alcohol use. He reports that he does not use drugs.    Review of patient's allergies indicates:  No Known Allergies    Medications:   Medications Prior to Admission   Medication Sig Dispense Refill Last Dose    aspirin 81 MG Chew Take 81 mg by mouth once daily.   12/21/2020 at Unknown time    atorvastatin (LIPITOR) 40 MG tablet Take 1 tablet (40 mg total) by mouth once daily. 90 tablet 0 12/21/2020 at Unknown time    carvediloL (COREG) 25 MG tablet Take 1 tablet (25 mg total) by mouth 2 (two) times daily. 180 tablet 3 12/21/2020 at Unknown time    famotidine (PEPCID) 20 MG tablet Take 1 tablet (20 mg total) by mouth every evening. 90 tablet 3 12/21/2020 at Unknown time    flecainide (TAMBOCOR) 100 MG Tab Take 1 tablet (100 mg total) by mouth every 12 (twelve) hours. 60 tablet 0 12/21/2020 at Unknown time    gabapentin (NEURONTIN) 300 MG capsule Take 1 tablet in the morning; 1 tablet midday, and 3 tablets at night. 450 capsule 3 12/20/2020 at Unknown time    insulin (LANTUS SOLOSTAR U-100 INSULIN) glargine 100 units/mL (3mL) SubQ pen Inject 35 units at night 12 mL 6 12/20/2020 at Unknown time    insulin lispro (HUMALOG KWIKPEN INSULIN) 100 unit/mL pen Inject 18 units w/ breakfast, 16 units w/ lunch and dinner plus scale 150-200 +2, 201-250 +4, 251-300 +6, 301-350 +8. 30 mL 4 12/20/2020 at Unknown time    melatonin 5 mg Tab Take 1 tablet (5 mg total) by mouth every evening.   12/20/2020 at Unknown time    mycophenolate (CELLCEPT) 250 mg Cap Take 4 capsules (1,000 mg total) by mouth 2 (two) times daily. 240 capsule 11 12/21/2020 at Unknown time    NIFEdipine (PROCARDIA-XL) 30 MG (OSM) 24 hr tablet Take 1 tablet (30 mg total) by mouth once daily. 90 tablet 3 12/20/2020 at Unknown time    pen needle, diabetic 32 gauge x 5/32" Ndle USE TO ADMINISTER INSULIN 4 TIMES DAILY. 400 each " 3 12/21/2020 at Unknown time    tacrolimus (PROGRAF) 0.5 MG Cap Take 2 capsules (1 mg total) by mouth every morning AND 1 capsule (0.5 mg total) every evening. 90 capsule 11 12/21/2020 at Unknown time    valsartan (DIOVAN) 320 MG tablet Take 1 tablet (320 mg total) by mouth once daily. 90 tablet 3 12/20/2020 at Unknown time    apixaban (ELIQUIS) 5 mg Tab Take 1 tablet (5 mg total) by mouth 2 (two) times daily. 60 tablet 11 12/19/2020    blood sugar diagnostic Strp To check BG 4 times daily, to use with insurance preferred meter-one touch 400 each 3     blood-glucose meter kit To check BG 4 times daily, to use with insurance preferred meter-one touch 1 each 0     lancets Misc To check BG 4 times daily, to use with insurance preferred meter-one touch 400 each 3     meclizine (ANTIVERT) 25 mg tablet Take 1 tablet (25 mg total) by mouth 3 (three) times daily as needed for Dizziness. 21 tablet 0 More than a month at Unknown time    predniSONE (DELTASONE) 5 MG tablet Take 1 tablet (5 mg total) by mouth once daily. 30 tablet 11     predniSONE (DELTASONE) 5 MG tablet Take 1 tablet (5 mg total) by mouth once daily. 7 tablet 0     semaglutide (OZEMPIC) 0.25 mg or 0.5 mg(2 mg/1.5 mL) PnIj Inject 0.25 mg weekly week 1-4 then increase to 0.5 mg weekly week 5 and on if tolerable. Has coupon 3 Syringe 3          Physical Exam:    Vital Signs:   Vitals:    12/21/20 0751   BP: (!) 187/92   Pulse: 69   Resp: 14   Temp: 98.2 °F (36.8 °C)       General Appearance: Well appearing in no acute distress  Eyes:    No scleral icterus  ENT: Neck supple, Lips, mucosa, and tongue normal; teeth and gums normal  Lungs: CTA bilaterally  Heart:  S1, S2 normal, no murmurs heard  Abdomen: Soft, non tender, non distended with positive bowel sounds. No hepatosplenomegaly, ascites, or mass.  Extremities: 2+ pulses, no clubbing, cyanosis or edema  Skin: No rash      Labs:  Lab Results   Component Value Date    WBC 5.47 11/10/2020    HGB 12.4  (L) 11/10/2020    HCT 41.8 11/10/2020     (L) 11/10/2020    CHOL 172 11/10/2020    TRIG 121 11/10/2020    HDL 48 11/10/2020    ALT <5 (L) 11/10/2020    AST 13 11/10/2020     11/10/2020    K 4.4 11/10/2020     11/10/2020    CREATININE 1.1 11/10/2020    BUN 11 11/10/2020    CO2 30 (H) 11/10/2020    TSH 1.143 03/15/2019    PSA 2.0 10/24/2016    INR 1.1 08/19/2019    HGBA1C 7.6 (H) 11/10/2020       I have explained the risks and benefits of endoscopy procedures to the patient including but not limited to bleeding, perforation, infection, and death.  The patient was asked if they understand and allowed to ask any further questions to their satisfaction.    Candelario Rondon MD

## 2020-12-21 NOTE — ADDENDUM NOTE
Addendum  created 12/21/20 1225 by Ranjana Devine MD    Attestation recorded in Intraprocedure, Intraprocedure Attestations filed

## 2020-12-21 NOTE — TRANSFER OF CARE
"Anesthesia Transfer of Care Note    Patient: Ravi Zamorano    Procedure(s) Performed: Procedure(s) (LRB):  COLONOSCOPY (N/A)    Patient location: PACU    Anesthesia Type: general    Transport from OR: Transported from OR on room air with adequate spontaneous ventilation    Post pain: adequate analgesia    Post assessment: no apparent anesthetic complications and tolerated procedure well    Post vital signs: stable    Level of consciousness: sedated    Nausea/Vomiting: no nausea/vomiting    Complications: none    Transfer of care protocol was followed      Last vitals:   Visit Vitals  BP (!) 187/92 (BP Location: Left arm, Patient Position: Lying)   Pulse 69   Temp 36.8 °C (98.2 °F) (Temporal)   Resp 14   Ht 6' 1" (1.854 m)   Wt 95.3 kg (210 lb)   SpO2 99%   BMI 27.71 kg/m²     "

## 2020-12-21 NOTE — DISCHARGE INSTRUCTIONS
Colonoscopy     A camera attached to a flexible tube with a viewing lens is used to take video pictures.     Colonoscopy is a test to view the inside of your lower digestive tract (colon and rectum). Sometimes it can show the last part of the small intestine (ileum). During the test, small pieces of tissue may be removed for testing. This is called a biopsy. Small growths, such as polyps, may also be removed.   Why is colonoscopy done?  The test is done to help look for colon cancer. And it can help find the source of abdominal pain, bleeding, and changes in bowel habits. It may be needed once a year, depending on factors such as your:  · Age  · Health history  · Family health history  · Symptoms  · Results from any prior colonoscopy  Risks and possible complications  These include:  · Bleeding               · A puncture or tear in the colon   · Risks of anesthesia  · A cancer lesion not being seen  Getting ready   To prepare for the test:  · Talk with your healthcare provider about the risks of the test (see below). Also ask your healthcare provider about alternatives to the test.  · Tell your healthcare provider about any medicines you take. Also tell him or her about any health conditions you may have.  · Make sure your rectum and colon are empty for the test. Follow the diet and bowel prep instructions exactly. If you dont, the test may need to be rescheduled.  · Plan for a friend or family member to drive you home after the test.     Colonoscopy provides an inside view of the entire colon.     You may discuss the results with your doctor right away or at a future visit.  During the test   The test is usually done in the hospital on an outpatient basis. This means you go home the same day. The procedure takes about 30 minutes. During that time:  · You are given relaxing (sedating) medicine through an IV line. You may be drowsy, or fully asleep.  · The healthcare provider will first give you a physical exam to  check for anal and rectal problems.  · Then the anus is lubricated and the scope inserted.  · If you are awake, you may have a feeling similar to needing to have a bowel movement. You may also feel pressure as air is pumped into the colon. Its OK to pass gas during the procedure.  · Biopsy, polyp removal, or other treatments may be done during the test.  After the test   You may have gas right after the test. It can help to try to pass it to help prevent later bloating. Your healthcare provider may discuss the results with you right away. Or you may need to schedule a follow-up visit to talk about the results. After the test, you can go back to your normal eating and other activities. You may be tired from the sedation and need to rest for a few hours.  Date Last Reviewed: 11/1/2016 © 2000-2017 [employer]. [address] [place] [address], [place] [address]. All rights reserved. This information is not intended as a substitute for professional medical care. Always follow your healthcare professional's instructions.

## 2020-12-29 RX ORDER — FLECAINIDE ACETATE 100 MG/1
100 TABLET ORAL EVERY 12 HOURS
Qty: 60 TABLET | Refills: 0 | Status: SHIPPED | OUTPATIENT
Start: 2020-12-29 | End: 2021-01-26 | Stop reason: SDUPTHER

## 2021-01-05 ENCOUNTER — TELEPHONE (OUTPATIENT)
Dept: UROLOGY | Facility: CLINIC | Age: 66
End: 2021-01-05

## 2021-01-05 DIAGNOSIS — N52.9 ERECTILE DYSFUNCTION: ICD-10-CM

## 2021-01-05 DIAGNOSIS — N52.01 ERECTILE DYSFUNCTION DUE TO ARTERIAL INSUFFICIENCY: Primary | ICD-10-CM

## 2021-01-25 ENCOUNTER — TELEPHONE (OUTPATIENT)
Dept: UROLOGY | Facility: CLINIC | Age: 66
End: 2021-01-25

## 2021-01-26 ENCOUNTER — TELEPHONE (OUTPATIENT)
Dept: INTERNAL MEDICINE | Facility: CLINIC | Age: 66
End: 2021-01-26

## 2021-01-26 DIAGNOSIS — E78.5 HYPERLIPIDEMIA, UNSPECIFIED HYPERLIPIDEMIA TYPE: ICD-10-CM

## 2021-01-26 DIAGNOSIS — I10 ESSENTIAL HYPERTENSION: Primary | ICD-10-CM

## 2021-01-26 RX ORDER — FLECAINIDE ACETATE 100 MG/1
100 TABLET ORAL EVERY 12 HOURS
Qty: 60 TABLET | Refills: 0 | Status: SHIPPED | OUTPATIENT
Start: 2021-01-26 | End: 2021-02-24 | Stop reason: SDUPTHER

## 2021-01-26 RX ORDER — ATORVASTATIN CALCIUM 40 MG/1
40 TABLET, FILM COATED ORAL DAILY
Qty: 90 TABLET | Refills: 3 | Status: SHIPPED | OUTPATIENT
Start: 2021-01-26 | End: 2022-01-25 | Stop reason: SDUPTHER

## 2021-01-26 RX ORDER — HYDROCHLOROTHIAZIDE 12.5 MG/1
12.5 TABLET ORAL DAILY
Qty: 90 TABLET | Refills: 1 | Status: SHIPPED | OUTPATIENT
Start: 2021-01-26 | End: 2021-08-01 | Stop reason: SDUPTHER

## 2021-02-04 ENCOUNTER — LAB VISIT (OUTPATIENT)
Dept: LAB | Facility: HOSPITAL | Age: 66
End: 2021-02-04
Attending: INTERNAL MEDICINE
Payer: COMMERCIAL

## 2021-02-04 ENCOUNTER — OFFICE VISIT (OUTPATIENT)
Dept: INTERNAL MEDICINE | Facility: CLINIC | Age: 66
End: 2021-02-04
Payer: COMMERCIAL

## 2021-02-04 VITALS
SYSTOLIC BLOOD PRESSURE: 134 MMHG | HEIGHT: 73 IN | WEIGHT: 209 LBS | DIASTOLIC BLOOD PRESSURE: 76 MMHG | BODY MASS INDEX: 27.7 KG/M2

## 2021-02-04 DIAGNOSIS — I10 ESSENTIAL HYPERTENSION: ICD-10-CM

## 2021-02-04 DIAGNOSIS — E11.3393 TYPE 2 DIABETES MELLITUS WITH BOTH EYES AFFECTED BY MODERATE NONPROLIFERATIVE RETINOPATHY WITHOUT MACULAR EDEMA, WITHOUT LONG-TERM CURRENT USE OF INSULIN: ICD-10-CM

## 2021-02-04 DIAGNOSIS — I10 ESSENTIAL HYPERTENSION: Primary | ICD-10-CM

## 2021-02-04 DIAGNOSIS — Z94.0 S/P KIDNEY TRANSPLANT: ICD-10-CM

## 2021-02-04 PROCEDURE — 36415 COLL VENOUS BLD VENIPUNCTURE: CPT

## 2021-02-04 PROCEDURE — 99999 PR PBB SHADOW E&M-EST. PATIENT-LVL III: CPT | Mod: PBBFAC,,, | Performed by: INTERNAL MEDICINE

## 2021-02-04 PROCEDURE — 99213 OFFICE O/P EST LOW 20 MIN: CPT | Mod: PBBFAC | Performed by: INTERNAL MEDICINE

## 2021-02-04 PROCEDURE — 99214 PR OFFICE/OUTPT VISIT, EST, LEVL IV, 30-39 MIN: ICD-10-PCS | Mod: S$PBB,,, | Performed by: INTERNAL MEDICINE

## 2021-02-04 PROCEDURE — 80048 BASIC METABOLIC PNL TOTAL CA: CPT

## 2021-02-04 PROCEDURE — 99214 OFFICE O/P EST MOD 30 MIN: CPT | Mod: S$PBB,,, | Performed by: INTERNAL MEDICINE

## 2021-02-04 PROCEDURE — 99999 PR PBB SHADOW E&M-EST. PATIENT-LVL III: ICD-10-PCS | Mod: PBBFAC,,, | Performed by: INTERNAL MEDICINE

## 2021-02-05 LAB
ANION GAP SERPL CALC-SCNC: 16 MMOL/L (ref 8–16)
BUN SERPL-MCNC: 19 MG/DL (ref 8–23)
CALCIUM SERPL-MCNC: 9.1 MG/DL (ref 8.7–10.5)
CHLORIDE SERPL-SCNC: 97 MMOL/L (ref 95–110)
CO2 SERPL-SCNC: 29 MMOL/L (ref 23–29)
CREAT SERPL-MCNC: 1.4 MG/DL (ref 0.5–1.4)
EST. GFR  (AFRICAN AMERICAN): >60 ML/MIN/1.73 M^2
EST. GFR  (NON AFRICAN AMERICAN): 53 ML/MIN/1.73 M^2
GLUCOSE SERPL-MCNC: 74 MG/DL (ref 70–110)
POTASSIUM SERPL-SCNC: 3.4 MMOL/L (ref 3.5–5.1)
SODIUM SERPL-SCNC: 142 MMOL/L (ref 136–145)

## 2021-02-12 ENCOUNTER — TELEPHONE (OUTPATIENT)
Dept: INTERNAL MEDICINE | Facility: CLINIC | Age: 66
End: 2021-02-12

## 2021-02-12 DIAGNOSIS — E87.6 HYPOKALEMIA: ICD-10-CM

## 2021-02-12 DIAGNOSIS — N18.2 CKD (CHRONIC KIDNEY DISEASE) STAGE 2, GFR 60-89 ML/MIN: Primary | ICD-10-CM

## 2021-02-24 RX ORDER — FLECAINIDE ACETATE 100 MG/1
100 TABLET ORAL EVERY 12 HOURS
Qty: 60 TABLET | Refills: 0 | Status: CANCELLED | OUTPATIENT
Start: 2021-02-24

## 2021-02-25 RX ORDER — FLECAINIDE ACETATE 100 MG/1
100 TABLET ORAL EVERY 12 HOURS
Qty: 60 TABLET | Refills: 0 | Status: SHIPPED | OUTPATIENT
Start: 2021-02-25 | End: 2021-03-24 | Stop reason: SDUPTHER

## 2021-03-25 RX ORDER — CARVEDILOL 25 MG/1
25 TABLET ORAL 2 TIMES DAILY
Qty: 180 TABLET | Refills: 3 | Status: SHIPPED | OUTPATIENT
Start: 2021-03-25 | End: 2022-03-16 | Stop reason: SDUPTHER

## 2021-03-25 RX ORDER — FLECAINIDE ACETATE 100 MG/1
100 TABLET ORAL EVERY 12 HOURS
Qty: 60 TABLET | Refills: 0 | Status: SHIPPED | OUTPATIENT
Start: 2021-03-25 | End: 2021-05-04 | Stop reason: SDUPTHER

## 2021-04-01 ENCOUNTER — LAB VISIT (OUTPATIENT)
Dept: LAB | Facility: HOSPITAL | Age: 66
End: 2021-04-01
Payer: COMMERCIAL

## 2021-04-01 DIAGNOSIS — Z79.4 TYPE 2 DIABETES MELLITUS WITH STAGE 2 CHRONIC KIDNEY DISEASE, WITH LONG-TERM CURRENT USE OF INSULIN: ICD-10-CM

## 2021-04-01 DIAGNOSIS — E11.22 TYPE 2 DIABETES MELLITUS WITH STAGE 2 CHRONIC KIDNEY DISEASE, WITH LONG-TERM CURRENT USE OF INSULIN: ICD-10-CM

## 2021-04-01 DIAGNOSIS — N18.2 TYPE 2 DIABETES MELLITUS WITH STAGE 2 CHRONIC KIDNEY DISEASE, WITH LONG-TERM CURRENT USE OF INSULIN: ICD-10-CM

## 2021-04-01 DIAGNOSIS — E11.3393 TYPE 2 DIABETES MELLITUS WITH BOTH EYES AFFECTED BY MODERATE NONPROLIFERATIVE RETINOPATHY WITHOUT MACULAR EDEMA, WITHOUT LONG-TERM CURRENT USE OF INSULIN: ICD-10-CM

## 2021-04-01 LAB
ESTIMATED AVG GLUCOSE: 169 MG/DL (ref 68–131)
HBA1C MFR BLD: 7.5 % (ref 4–5.6)

## 2021-04-01 PROCEDURE — 36415 COLL VENOUS BLD VENIPUNCTURE: CPT | Performed by: NURSE PRACTITIONER

## 2021-04-01 PROCEDURE — 83036 HEMOGLOBIN GLYCOSYLATED A1C: CPT | Performed by: NURSE PRACTITIONER

## 2021-05-04 RX ORDER — FLECAINIDE ACETATE 100 MG/1
100 TABLET ORAL EVERY 12 HOURS
Qty: 60 TABLET | Refills: 0 | Status: SHIPPED | OUTPATIENT
Start: 2021-05-04 | End: 2021-05-31 | Stop reason: SDUPTHER

## 2021-05-12 RX ORDER — LANCETS 33 GAUGE
EACH MISCELLANEOUS
Qty: 400 EACH | Refills: 3 | Status: SHIPPED | OUTPATIENT
Start: 2021-05-12 | End: 2023-01-18

## 2021-05-17 ENCOUNTER — TELEPHONE (OUTPATIENT)
Dept: INTERNAL MEDICINE | Facility: CLINIC | Age: 66
End: 2021-05-17

## 2021-05-20 ENCOUNTER — PATIENT OUTREACH (OUTPATIENT)
Dept: ADMINISTRATIVE | Facility: OTHER | Age: 66
End: 2021-05-20

## 2021-05-21 ENCOUNTER — OFFICE VISIT (OUTPATIENT)
Dept: NEPHROLOGY | Facility: CLINIC | Age: 66
End: 2021-05-21
Payer: MEDICARE

## 2021-05-21 VITALS
HEART RATE: 60 BPM | WEIGHT: 212.31 LBS | HEIGHT: 73 IN | SYSTOLIC BLOOD PRESSURE: 110 MMHG | OXYGEN SATURATION: 99 % | DIASTOLIC BLOOD PRESSURE: 60 MMHG | BODY MASS INDEX: 28.14 KG/M2

## 2021-05-21 DIAGNOSIS — N18.2 CHRONIC KIDNEY DISEASE, STAGE II (MILD): Primary | ICD-10-CM

## 2021-05-21 DIAGNOSIS — Z94.0 DECEASED-DONOR KIDNEY TRANSPLANT RECIPIENT: ICD-10-CM

## 2021-05-21 DIAGNOSIS — N18.2 ANEMIA OF CHRONIC RENAL FAILURE, STAGE 2 (MILD): ICD-10-CM

## 2021-05-21 DIAGNOSIS — D63.1 ANEMIA OF CHRONIC RENAL FAILURE, STAGE 2 (MILD): ICD-10-CM

## 2021-05-21 DIAGNOSIS — E55.9 VITAMIN D DEFICIENCY: ICD-10-CM

## 2021-05-21 PROCEDURE — 99214 OFFICE O/P EST MOD 30 MIN: CPT | Mod: S$PBB,,, | Performed by: INTERNAL MEDICINE

## 2021-05-21 PROCEDURE — 99999 PR PBB SHADOW E&M-EST. PATIENT-LVL IV: CPT | Mod: PBBFAC,,, | Performed by: INTERNAL MEDICINE

## 2021-05-21 PROCEDURE — 99214 OFFICE O/P EST MOD 30 MIN: CPT | Mod: PBBFAC | Performed by: INTERNAL MEDICINE

## 2021-05-21 PROCEDURE — 99214 PR OFFICE/OUTPT VISIT, EST, LEVL IV, 30-39 MIN: ICD-10-PCS | Mod: S$PBB,,, | Performed by: INTERNAL MEDICINE

## 2021-05-21 PROCEDURE — 99999 PR PBB SHADOW E&M-EST. PATIENT-LVL IV: ICD-10-PCS | Mod: PBBFAC,,, | Performed by: INTERNAL MEDICINE

## 2021-05-24 DIAGNOSIS — I49.8 OTHER SPECIFIED CARDIAC ARRHYTHMIAS: Primary | ICD-10-CM

## 2021-05-26 ENCOUNTER — LAB VISIT (OUTPATIENT)
Dept: LAB | Facility: HOSPITAL | Age: 66
End: 2021-05-26
Attending: INTERNAL MEDICINE
Payer: COMMERCIAL

## 2021-05-26 ENCOUNTER — OFFICE VISIT (OUTPATIENT)
Dept: ELECTROPHYSIOLOGY | Facility: CLINIC | Age: 66
End: 2021-05-26
Payer: COMMERCIAL

## 2021-05-26 VITALS
WEIGHT: 209.69 LBS | BODY MASS INDEX: 27.79 KG/M2 | HEART RATE: 80 BPM | DIASTOLIC BLOOD PRESSURE: 73 MMHG | HEIGHT: 73 IN | SYSTOLIC BLOOD PRESSURE: 128 MMHG

## 2021-05-26 DIAGNOSIS — M25.562 LEFT KNEE PAIN, UNSPECIFIED CHRONICITY: ICD-10-CM

## 2021-05-26 DIAGNOSIS — I10 ESSENTIAL HYPERTENSION: ICD-10-CM

## 2021-05-26 DIAGNOSIS — R06.02 SOB (SHORTNESS OF BREATH): ICD-10-CM

## 2021-05-26 DIAGNOSIS — I50.32 CHRONIC DIASTOLIC CONGESTIVE HEART FAILURE: ICD-10-CM

## 2021-05-26 DIAGNOSIS — D63.1 ANEMIA OF CHRONIC RENAL FAILURE, STAGE 2 (MILD): ICD-10-CM

## 2021-05-26 DIAGNOSIS — N18.2 ANEMIA OF CHRONIC RENAL FAILURE, STAGE 2 (MILD): ICD-10-CM

## 2021-05-26 DIAGNOSIS — I48.19 PERSISTENT ATRIAL FIBRILLATION: Primary | ICD-10-CM

## 2021-05-26 DIAGNOSIS — Z79.01 ANTICOAGULANT LONG-TERM USE: ICD-10-CM

## 2021-05-26 DIAGNOSIS — I49.8 OTHER SPECIFIED CARDIAC ARRHYTHMIAS: ICD-10-CM

## 2021-05-26 DIAGNOSIS — N18.2 CHRONIC KIDNEY DISEASE, STAGE II (MILD): ICD-10-CM

## 2021-05-26 DIAGNOSIS — E55.9 VITAMIN D DEFICIENCY: ICD-10-CM

## 2021-05-26 LAB
25(OH)D3+25(OH)D2 SERPL-MCNC: 17 NG/ML (ref 30–96)
ALBUMIN SERPL BCP-MCNC: 3.5 G/DL (ref 3.5–5.2)
ANION GAP SERPL CALC-SCNC: 13 MMOL/L (ref 8–16)
BASOPHILS # BLD AUTO: 0.04 K/UL (ref 0–0.2)
BASOPHILS NFR BLD: 0.7 % (ref 0–1.9)
BUN SERPL-MCNC: 22 MG/DL (ref 8–23)
CALCIUM SERPL-MCNC: 8.9 MG/DL (ref 8.7–10.5)
CHLORIDE SERPL-SCNC: 96 MMOL/L (ref 95–110)
CO2 SERPL-SCNC: 25 MMOL/L (ref 23–29)
CREAT SERPL-MCNC: 1.6 MG/DL (ref 0.5–1.4)
DIFFERENTIAL METHOD: ABNORMAL
EOSINOPHIL # BLD AUTO: 0 K/UL (ref 0–0.5)
EOSINOPHIL NFR BLD: 0.5 % (ref 0–8)
ERYTHROCYTE [DISTWIDTH] IN BLOOD BY AUTOMATED COUNT: 16 % (ref 11.5–14.5)
EST. GFR  (AFRICAN AMERICAN): 52.2 ML/MIN/1.73 M^2
EST. GFR  (NON AFRICAN AMERICAN): 45.2 ML/MIN/1.73 M^2
FERRITIN SERPL-MCNC: 29 NG/ML (ref 20–300)
GLUCOSE SERPL-MCNC: 232 MG/DL (ref 70–110)
HCT VFR BLD AUTO: 31.8 % (ref 40–54)
HGB BLD-MCNC: 9.3 G/DL (ref 14–18)
IMM GRANULOCYTES # BLD AUTO: 0.02 K/UL (ref 0–0.04)
IMM GRANULOCYTES NFR BLD AUTO: 0.3 % (ref 0–0.5)
IRON SERPL-MCNC: 32 UG/DL (ref 45–160)
LYMPHOCYTES # BLD AUTO: 1.3 K/UL (ref 1–4.8)
LYMPHOCYTES NFR BLD: 21.5 % (ref 18–48)
MCH RBC QN AUTO: 22.6 PG (ref 27–31)
MCHC RBC AUTO-ENTMCNC: 29.2 G/DL (ref 32–36)
MCV RBC AUTO: 77 FL (ref 82–98)
MONOCYTES # BLD AUTO: 1.4 K/UL (ref 0.3–1)
MONOCYTES NFR BLD: 23.2 % (ref 4–15)
NEUTROPHILS # BLD AUTO: 3.2 K/UL (ref 1.8–7.7)
NEUTROPHILS NFR BLD: 53.8 % (ref 38–73)
NRBC BLD-RTO: 0 /100 WBC
PHOSPHATE SERPL-MCNC: 2.3 MG/DL (ref 2.7–4.5)
PLATELET # BLD AUTO: 181 K/UL (ref 150–450)
PMV BLD AUTO: 13.5 FL (ref 9.2–12.9)
POTASSIUM SERPL-SCNC: 3.5 MMOL/L (ref 3.5–5.1)
PTH-INTACT SERPL-MCNC: 370 PG/ML (ref 9–77)
RBC # BLD AUTO: 4.12 M/UL (ref 4.6–6.2)
SATURATED IRON: 9 % (ref 20–50)
SODIUM SERPL-SCNC: 134 MMOL/L (ref 136–145)
TOTAL IRON BINDING CAPACITY: 370 UG/DL (ref 250–450)
TRANSFERRIN SERPL-MCNC: 250 MG/DL (ref 200–375)
WBC # BLD AUTO: 5.96 K/UL (ref 3.9–12.7)

## 2021-05-26 PROCEDURE — 93005 ELECTROCARDIOGRAM TRACING: CPT | Mod: PBBFAC | Performed by: INTERNAL MEDICINE

## 2021-05-26 PROCEDURE — 82306 VITAMIN D 25 HYDROXY: CPT | Performed by: INTERNAL MEDICINE

## 2021-05-26 PROCEDURE — 99999 PR PBB SHADOW E&M-EST. PATIENT-LVL III: ICD-10-PCS | Mod: PBBFAC,,, | Performed by: NURSE PRACTITIONER

## 2021-05-26 PROCEDURE — 80069 RENAL FUNCTION PANEL: CPT | Performed by: INTERNAL MEDICINE

## 2021-05-26 PROCEDURE — 99214 OFFICE O/P EST MOD 30 MIN: CPT | Mod: S$PBB,,, | Performed by: NURSE PRACTITIONER

## 2021-05-26 PROCEDURE — 99214 PR OFFICE/OUTPT VISIT, EST, LEVL IV, 30-39 MIN: ICD-10-PCS | Mod: S$PBB,,, | Performed by: NURSE PRACTITIONER

## 2021-05-26 PROCEDURE — 93010 ELECTROCARDIOGRAM REPORT: CPT | Mod: S$PBB,,, | Performed by: INTERNAL MEDICINE

## 2021-05-26 PROCEDURE — 99213 OFFICE O/P EST LOW 20 MIN: CPT | Mod: PBBFAC,25 | Performed by: NURSE PRACTITIONER

## 2021-05-26 PROCEDURE — 83970 ASSAY OF PARATHORMONE: CPT | Performed by: INTERNAL MEDICINE

## 2021-05-26 PROCEDURE — 99999 PR PBB SHADOW E&M-EST. PATIENT-LVL III: CPT | Mod: PBBFAC,,, | Performed by: NURSE PRACTITIONER

## 2021-05-26 PROCEDURE — 83540 ASSAY OF IRON: CPT | Performed by: INTERNAL MEDICINE

## 2021-05-26 PROCEDURE — 93010 RHYTHM STRIP: ICD-10-PCS | Mod: S$PBB,,, | Performed by: INTERNAL MEDICINE

## 2021-05-26 PROCEDURE — 82728 ASSAY OF FERRITIN: CPT | Performed by: INTERNAL MEDICINE

## 2021-05-26 PROCEDURE — 85025 COMPLETE CBC W/AUTO DIFF WBC: CPT | Performed by: INTERNAL MEDICINE

## 2021-05-31 RX ORDER — INSULIN LISPRO 100 [IU]/ML
INJECTION, SOLUTION INTRAVENOUS; SUBCUTANEOUS
Qty: 30 ML | Refills: 4 | Status: SHIPPED | OUTPATIENT
Start: 2021-05-31 | End: 2022-01-25 | Stop reason: SDUPTHER

## 2021-05-31 RX ORDER — FLECAINIDE ACETATE 100 MG/1
100 TABLET ORAL EVERY 12 HOURS
Qty: 60 TABLET | Refills: 0 | Status: SHIPPED | OUTPATIENT
Start: 2021-05-31 | End: 2021-06-22 | Stop reason: SDUPTHER

## 2021-06-16 ENCOUNTER — OFFICE VISIT (OUTPATIENT)
Dept: INTERNAL MEDICINE | Facility: CLINIC | Age: 66
End: 2021-06-16
Payer: COMMERCIAL

## 2021-06-16 ENCOUNTER — HOSPITAL ENCOUNTER (OUTPATIENT)
Dept: CARDIOLOGY | Facility: HOSPITAL | Age: 66
Discharge: HOME | End: 2021-06-16
Attending: NURSE PRACTITIONER
Payer: COMMERCIAL

## 2021-06-16 VITALS
HEIGHT: 73 IN | HEART RATE: 66 BPM | HEART RATE: 66 BPM | BODY MASS INDEX: 27.32 KG/M2 | WEIGHT: 206.13 LBS | SYSTOLIC BLOOD PRESSURE: 150 MMHG | DIASTOLIC BLOOD PRESSURE: 72 MMHG | OXYGEN SATURATION: 99 % | SYSTOLIC BLOOD PRESSURE: 152 MMHG | WEIGHT: 209 LBS | DIASTOLIC BLOOD PRESSURE: 79 MMHG | BODY MASS INDEX: 27.7 KG/M2 | HEIGHT: 73 IN

## 2021-06-16 DIAGNOSIS — I50.32 CHRONIC DIASTOLIC CONGESTIVE HEART FAILURE: ICD-10-CM

## 2021-06-16 DIAGNOSIS — N18.2 TYPE 2 DIABETES MELLITUS WITH STAGE 2 CHRONIC KIDNEY DISEASE, WITH LONG-TERM CURRENT USE OF INSULIN: Primary | ICD-10-CM

## 2021-06-16 DIAGNOSIS — E11.42 DIABETIC POLYNEUROPATHY ASSOCIATED WITH TYPE 2 DIABETES MELLITUS: ICD-10-CM

## 2021-06-16 DIAGNOSIS — I10 ESSENTIAL HYPERTENSION: ICD-10-CM

## 2021-06-16 DIAGNOSIS — R06.02 SOB (SHORTNESS OF BREATH): ICD-10-CM

## 2021-06-16 DIAGNOSIS — T45.1X5D ADVERSE EFFECT OF IMMUNOSUPPRESSIVE DRUG, SUBSEQUENT ENCOUNTER: ICD-10-CM

## 2021-06-16 DIAGNOSIS — E11.22 TYPE 2 DIABETES MELLITUS WITH STAGE 2 CHRONIC KIDNEY DISEASE, WITH LONG-TERM CURRENT USE OF INSULIN: Primary | ICD-10-CM

## 2021-06-16 DIAGNOSIS — I48.19 PERSISTENT ATRIAL FIBRILLATION: ICD-10-CM

## 2021-06-16 DIAGNOSIS — E78.5 HYPERLIPIDEMIA, UNSPECIFIED HYPERLIPIDEMIA TYPE: ICD-10-CM

## 2021-06-16 DIAGNOSIS — Z94.0 S/P KIDNEY TRANSPLANT: ICD-10-CM

## 2021-06-16 DIAGNOSIS — Z79.4 TYPE 2 DIABETES MELLITUS WITH STAGE 2 CHRONIC KIDNEY DISEASE, WITH LONG-TERM CURRENT USE OF INSULIN: Primary | ICD-10-CM

## 2021-06-16 LAB
ASCENDING AORTA: 2.92 CM
AV INDEX (PROSTH): 0.93
AV MEAN GRADIENT: 4 MMHG
AV PEAK GRADIENT: 7 MMHG
AV VALVE AREA: 3.75 CM2
AV VELOCITY RATIO: 0.91
BSA FOR ECHO PROCEDURE: 2.21 M2
CV ECHO LV RWT: 0.44 CM
DOP CALC AO PEAK VEL: 1.28 M/S
DOP CALC AO VTI: 25.91 CM
DOP CALC LVOT AREA: 4 CM2
DOP CALC LVOT DIAMETER: 2.27 CM
DOP CALC LVOT PEAK VEL: 1.16 M/S
DOP CALC LVOT STROKE VOLUME: 97.28 CM3
DOP CALCLVOT PEAK VEL VTI: 24.05 CM
E WAVE DECELERATION TIME: 189.13 MSEC
E/A RATIO: 2.78
E/E' RATIO: 11.87 M/S
ECHO LV POSTERIOR WALL: 1.2 CM (ref 0.6–1.1)
EJECTION FRACTION: 55 %
FRACTIONAL SHORTENING: 39 % (ref 28–44)
INTERVENTRICULAR SEPTUM: 1.3 CM (ref 0.6–1.1)
LA MAJOR: 6.25 CM
LA MINOR: 6.54 CM
LA WIDTH: 4.24 CM
LEFT ATRIUM SIZE: 4.22 CM
LEFT ATRIUM VOLUME INDEX MOD: 41.6 ML/M2
LEFT ATRIUM VOLUME INDEX: 44.4 ML/M2
LEFT ATRIUM VOLUME MOD: 91 CM3
LEFT ATRIUM VOLUME: 97.21 CM3
LEFT INTERNAL DIMENSION IN SYSTOLE: 3.37 CM (ref 2.1–4)
LEFT VENTRICLE DIASTOLIC VOLUME INDEX: 51.47 ML/M2
LEFT VENTRICLE DIASTOLIC VOLUME: 112.72 ML
LEFT VENTRICLE MASS INDEX: 132 G/M2
LEFT VENTRICLE SYSTOLIC VOLUME INDEX: 21.1 ML/M2
LEFT VENTRICLE SYSTOLIC VOLUME: 46.29 ML
LEFT VENTRICULAR INTERNAL DIMENSION IN DIASTOLE: 5.5 CM (ref 3.5–6)
LEFT VENTRICULAR MASS: 288.16 G
LV LATERAL E/E' RATIO: 8.09 M/S
LV SEPTAL E/E' RATIO: 22.25 M/S
MV A" WAVE DURATION": 10.85 MSEC
MV PEAK A VEL: 0.32 M/S
MV PEAK E VEL: 0.89 M/S
MV STENOSIS PRESSURE HALF TIME: 54.85 MS
MV VALVE AREA P 1/2 METHOD: 4.01 CM2
PISA TR MAX VEL: 3.13 M/S
PULM VEIN S/D RATIO: 0.78
PV PEAK D VEL: 0.81 M/S
PV PEAK S VEL: 0.63 M/S
RA MAJOR: 4.78 CM
RA PRESSURE: 3 MMHG
RA WIDTH: 4.81 CM
RIGHT VENTRICULAR END-DIASTOLIC DIMENSION: 3.73 CM
RV TISSUE DOPPLER FREE WALL SYSTOLIC VELOCITY 1 (APICAL 4 CHAMBER VIEW): 10.91 CM/S
SINUS: 3.39 CM
STJ: 2.67 CM
TDI LATERAL: 0.11 M/S
TDI SEPTAL: 0.04 M/S
TDI: 0.08 M/S
TR MAX PG: 39 MMHG
TRICUSPID ANNULAR PLANE SYSTOLIC EXCURSION: 1.69 CM
TV REST PULMONARY ARTERY PRESSURE: 42 MMHG

## 2021-06-16 PROCEDURE — 93306 ECHO (CUPID ONLY): ICD-10-PCS | Mod: 26,,, | Performed by: INTERNAL MEDICINE

## 2021-06-16 PROCEDURE — 99214 PR OFFICE/OUTPT VISIT, EST, LEVL IV, 30-39 MIN: ICD-10-PCS | Mod: S$PBB,,, | Performed by: NURSE PRACTITIONER

## 2021-06-16 PROCEDURE — 99999 PR PBB SHADOW E&M-EST. PATIENT-LVL IV: ICD-10-PCS | Mod: PBBFAC,,, | Performed by: NURSE PRACTITIONER

## 2021-06-16 PROCEDURE — 99999 PR PBB SHADOW E&M-EST. PATIENT-LVL IV: CPT | Mod: PBBFAC,,, | Performed by: NURSE PRACTITIONER

## 2021-06-16 PROCEDURE — 99214 OFFICE O/P EST MOD 30 MIN: CPT | Mod: PBBFAC,25 | Performed by: NURSE PRACTITIONER

## 2021-06-16 PROCEDURE — 93306 TTE W/DOPPLER COMPLETE: CPT

## 2021-06-16 PROCEDURE — 93306 TTE W/DOPPLER COMPLETE: CPT | Mod: 26,,, | Performed by: INTERNAL MEDICINE

## 2021-06-16 PROCEDURE — 99214 OFFICE O/P EST MOD 30 MIN: CPT | Mod: S$PBB,,, | Performed by: NURSE PRACTITIONER

## 2021-06-16 RX ORDER — SEMAGLUTIDE 1.34 MG/ML
INJECTION, SOLUTION SUBCUTANEOUS
Qty: 3 PEN | Refills: 3 | Status: SHIPPED | OUTPATIENT
Start: 2021-06-16 | End: 2022-12-07 | Stop reason: SDUPTHER

## 2021-06-17 ENCOUNTER — TELEPHONE (OUTPATIENT)
Dept: ELECTROPHYSIOLOGY | Facility: CLINIC | Age: 66
End: 2021-06-17

## 2021-06-18 ENCOUNTER — TELEPHONE (OUTPATIENT)
Dept: ELECTROPHYSIOLOGY | Facility: CLINIC | Age: 66
End: 2021-06-18

## 2021-06-22 ENCOUNTER — CLINICAL SUPPORT (OUTPATIENT)
Dept: CARDIOLOGY | Facility: HOSPITAL | Age: 66
End: 2021-06-22
Attending: NURSE PRACTITIONER
Payer: MEDICARE

## 2021-06-22 DIAGNOSIS — I48.19 PERSISTENT ATRIAL FIBRILLATION: ICD-10-CM

## 2021-06-22 DIAGNOSIS — I10 ESSENTIAL HYPERTENSION: ICD-10-CM

## 2021-06-22 PROCEDURE — 93248 EXT ECG>7D<15D REV&INTERPJ: CPT | Mod: ,,, | Performed by: INTERNAL MEDICINE

## 2021-06-22 PROCEDURE — 93246 EXT ECG>7D<15D RECORDING: CPT

## 2021-06-22 PROCEDURE — 93248 CV CARDIAC MONITOR - 3-14 DAY ADULT (CUPID ONLY): ICD-10-PCS | Mod: ,,, | Performed by: INTERNAL MEDICINE

## 2021-06-22 PROCEDURE — 93247 EXT ECG>7D<15D SCAN A/R: CPT

## 2021-06-22 RX ORDER — PEN NEEDLE, DIABETIC 30 GX3/16"
NEEDLE, DISPOSABLE MISCELLANEOUS
Qty: 400 EACH | Refills: 3 | Status: SHIPPED | OUTPATIENT
Start: 2021-06-22 | End: 2022-07-18 | Stop reason: SDUPTHER

## 2021-06-22 RX ORDER — FLECAINIDE ACETATE 100 MG/1
100 TABLET ORAL EVERY 12 HOURS
Qty: 60 TABLET | Refills: 0 | Status: SHIPPED | OUTPATIENT
Start: 2021-06-22 | End: 2021-08-01 | Stop reason: SDUPTHER

## 2021-06-22 RX ORDER — VALSARTAN 320 MG/1
320 TABLET ORAL DAILY
Qty: 90 TABLET | Refills: 3 | Status: SHIPPED | OUTPATIENT
Start: 2021-06-22 | End: 2022-06-23 | Stop reason: SDUPTHER

## 2021-07-06 ENCOUNTER — HOSPITAL ENCOUNTER (OUTPATIENT)
Dept: RADIOLOGY | Facility: HOSPITAL | Age: 66
Discharge: HOME | End: 2021-07-06
Attending: PHYSICIAN ASSISTANT
Payer: MEDICARE

## 2021-07-06 ENCOUNTER — PATIENT OUTREACH (OUTPATIENT)
Dept: ADMINISTRATIVE | Facility: OTHER | Age: 66
End: 2021-07-06

## 2021-07-06 ENCOUNTER — OFFICE VISIT (OUTPATIENT)
Dept: SPORTS MEDICINE | Facility: CLINIC | Age: 66
End: 2021-07-06
Payer: MEDICARE

## 2021-07-06 VITALS
BODY MASS INDEX: 27.3 KG/M2 | HEART RATE: 63 BPM | SYSTOLIC BLOOD PRESSURE: 163 MMHG | HEIGHT: 73 IN | WEIGHT: 206 LBS | DIASTOLIC BLOOD PRESSURE: 79 MMHG

## 2021-07-06 DIAGNOSIS — M25.562 LEFT KNEE PAIN, UNSPECIFIED CHRONICITY: ICD-10-CM

## 2021-07-06 PROCEDURE — 99214 OFFICE O/P EST MOD 30 MIN: CPT | Mod: S$PBB,,, | Performed by: PHYSICIAN ASSISTANT

## 2021-07-06 PROCEDURE — 99999 PR PBB SHADOW E&M-EST. PATIENT-LVL V: CPT | Mod: PBBFAC,,, | Performed by: PHYSICIAN ASSISTANT

## 2021-07-06 PROCEDURE — 99215 OFFICE O/P EST HI 40 MIN: CPT | Mod: PBBFAC | Performed by: PHYSICIAN ASSISTANT

## 2021-07-06 PROCEDURE — 73564 XR KNEE ORTHO BILAT WITH FLEXION: ICD-10-PCS | Mod: 26,50,, | Performed by: RADIOLOGY

## 2021-07-06 PROCEDURE — 73564 X-RAY EXAM KNEE 4 OR MORE: CPT | Mod: TC,50

## 2021-07-06 PROCEDURE — 99214 PR OFFICE/OUTPT VISIT, EST, LEVL IV, 30-39 MIN: ICD-10-PCS | Mod: S$PBB,,, | Performed by: PHYSICIAN ASSISTANT

## 2021-07-06 PROCEDURE — 99999 PR PBB SHADOW E&M-EST. PATIENT-LVL V: ICD-10-PCS | Mod: PBBFAC,,, | Performed by: PHYSICIAN ASSISTANT

## 2021-07-06 PROCEDURE — 73564 X-RAY EXAM KNEE 4 OR MORE: CPT | Mod: 26,50,, | Performed by: RADIOLOGY

## 2021-07-21 ENCOUNTER — TELEPHONE (OUTPATIENT)
Dept: ELECTROPHYSIOLOGY | Facility: CLINIC | Age: 66
End: 2021-07-21

## 2021-08-01 DIAGNOSIS — I10 ESSENTIAL HYPERTENSION: ICD-10-CM

## 2021-08-01 DIAGNOSIS — R06.09 DOE (DYSPNEA ON EXERTION): ICD-10-CM

## 2021-08-01 DIAGNOSIS — I48.91 NEW ONSET A-FIB: ICD-10-CM

## 2021-08-02 ENCOUNTER — CLINICAL SUPPORT (OUTPATIENT)
Dept: URGENT CARE | Facility: CLINIC | Age: 66
End: 2021-08-02
Payer: MEDICARE

## 2021-08-02 ENCOUNTER — TELEPHONE (OUTPATIENT)
Dept: URGENT CARE | Facility: CLINIC | Age: 66
End: 2021-08-02

## 2021-08-02 DIAGNOSIS — Z11.59 ENCOUNTER FOR SCREENING FOR OTHER VIRAL DISEASES: Primary | ICD-10-CM

## 2021-08-02 DIAGNOSIS — U07.1 COVID-19: Primary | ICD-10-CM

## 2021-08-02 LAB
CTP QC/QA: YES
SARS-COV-2 RDRP RESP QL NAA+PROBE: POSITIVE

## 2021-08-02 PROCEDURE — U0002 COVID-19 LAB TEST NON-CDC: HCPCS | Mod: QW,CR,S$GLB, | Performed by: STUDENT IN AN ORGANIZED HEALTH CARE EDUCATION/TRAINING PROGRAM

## 2021-08-02 PROCEDURE — U0002: ICD-10-PCS | Mod: QW,CR,S$GLB, | Performed by: STUDENT IN AN ORGANIZED HEALTH CARE EDUCATION/TRAINING PROGRAM

## 2021-08-02 RX ORDER — FLECAINIDE ACETATE 100 MG/1
100 TABLET ORAL EVERY 12 HOURS
Qty: 60 TABLET | Refills: 0 | Status: SHIPPED | OUTPATIENT
Start: 2021-08-02 | End: 2021-08-28 | Stop reason: SDUPTHER

## 2021-08-03 ENCOUNTER — INFUSION (OUTPATIENT)
Dept: INFECTIOUS DISEASES | Facility: HOSPITAL | Age: 66
End: 2021-08-03
Attending: STUDENT IN AN ORGANIZED HEALTH CARE EDUCATION/TRAINING PROGRAM
Payer: COMMERCIAL

## 2021-08-03 VITALS
SYSTOLIC BLOOD PRESSURE: 138 MMHG | HEART RATE: 51 BPM | HEIGHT: 73 IN | WEIGHT: 205 LBS | RESPIRATION RATE: 16 BRPM | TEMPERATURE: 98 F | DIASTOLIC BLOOD PRESSURE: 67 MMHG | BODY MASS INDEX: 27.17 KG/M2 | OXYGEN SATURATION: 99 %

## 2021-08-03 DIAGNOSIS — U07.1 COVID-19: Primary | ICD-10-CM

## 2021-08-03 PROCEDURE — M0243 CASIRIVI AND IMDEVI INFUSION: HCPCS | Performed by: INTERNAL MEDICINE

## 2021-08-03 PROCEDURE — 25000003 PHARM REV CODE 250: Performed by: INTERNAL MEDICINE

## 2021-08-03 PROCEDURE — 63600175 PHARM REV CODE 636 W HCPCS: Performed by: INTERNAL MEDICINE

## 2021-08-03 RX ORDER — ALBUTEROL SULFATE 90 UG/1
2 AEROSOL, METERED RESPIRATORY (INHALATION)
Status: DISCONTINUED | OUTPATIENT
Start: 2021-08-03 | End: 2021-12-15

## 2021-08-03 RX ORDER — ACETAMINOPHEN 325 MG/1
650 TABLET ORAL ONCE AS NEEDED
Status: DISCONTINUED | OUTPATIENT
Start: 2021-08-03 | End: 2021-12-15

## 2021-08-03 RX ORDER — DIPHENHYDRAMINE HYDROCHLORIDE 50 MG/ML
25 INJECTION INTRAMUSCULAR; INTRAVENOUS ONCE AS NEEDED
Status: DISCONTINUED | OUTPATIENT
Start: 2021-08-03 | End: 2021-12-15

## 2021-08-03 RX ORDER — ONDANSETRON 4 MG/1
4 TABLET, ORALLY DISINTEGRATING ORAL ONCE AS NEEDED
Status: DISCONTINUED | OUTPATIENT
Start: 2021-08-03 | End: 2021-12-15

## 2021-08-03 RX ORDER — HYDROCHLOROTHIAZIDE 12.5 MG/1
12.5 TABLET ORAL DAILY
Qty: 90 TABLET | Refills: 1 | Status: SHIPPED | OUTPATIENT
Start: 2021-08-03 | End: 2022-01-25 | Stop reason: SDUPTHER

## 2021-08-03 RX ORDER — SODIUM CHLORIDE 0.9 % (FLUSH) 0.9 %
10 SYRINGE (ML) INJECTION
Status: DISCONTINUED | OUTPATIENT
Start: 2021-08-03 | End: 2021-12-15

## 2021-08-03 RX ORDER — EPINEPHRINE 0.3 MG/.3ML
0.3 INJECTION SUBCUTANEOUS
Status: DISCONTINUED | OUTPATIENT
Start: 2021-08-03 | End: 2021-12-15

## 2021-08-03 RX ADMIN — CASIRIVIMAB AND IMDEVIMAB 600 MG: 600; 600 INJECTION, SOLUTION, CONCENTRATE INTRAVENOUS at 02:08

## 2021-08-23 ENCOUNTER — TELEPHONE (OUTPATIENT)
Dept: SPORTS MEDICINE | Facility: CLINIC | Age: 66
End: 2021-08-23

## 2021-08-24 DIAGNOSIS — E11.42 DIABETIC POLYNEUROPATHY ASSOCIATED WITH TYPE 2 DIABETES MELLITUS: ICD-10-CM

## 2021-08-25 RX ORDER — GABAPENTIN 300 MG/1
CAPSULE ORAL
Qty: 450 CAPSULE | Refills: 3 | Status: SHIPPED | OUTPATIENT
Start: 2021-08-25 | End: 2022-07-26 | Stop reason: SDUPTHER

## 2021-08-28 RX ORDER — PREDNISONE 5 MG/1
5 TABLET ORAL DAILY
Qty: 30 TABLET | Refills: 0 | Status: ON HOLD | OUTPATIENT
Start: 2021-08-28 | End: 2022-07-12 | Stop reason: HOSPADM

## 2021-08-28 RX ORDER — FLECAINIDE ACETATE 100 MG/1
100 TABLET ORAL EVERY 12 HOURS
Qty: 60 TABLET | Refills: 0 | OUTPATIENT
Start: 2021-08-28 | End: 2021-09-28 | Stop reason: SDUPTHER

## 2021-09-12 DIAGNOSIS — Z94.0 KIDNEY REPLACED BY TRANSPLANT: ICD-10-CM

## 2021-09-13 RX ORDER — TACROLIMUS 0.5 MG/1
CAPSULE ORAL
Qty: 90 CAPSULE | Refills: 11 | Status: SHIPPED | OUTPATIENT
Start: 2021-09-13 | End: 2021-11-28

## 2021-09-28 RX ORDER — FLECAINIDE ACETATE 100 MG/1
100 TABLET ORAL EVERY 12 HOURS
Qty: 60 TABLET | Refills: 0 | Status: SHIPPED | OUTPATIENT
Start: 2021-09-28 | End: 2021-11-01 | Stop reason: SDUPTHER

## 2021-10-13 DIAGNOSIS — E11.9 TYPE 2 DIABETES MELLITUS WITHOUT COMPLICATION: ICD-10-CM

## 2021-10-22 RX ORDER — PREDNISONE 5 MG/1
5 TABLET ORAL DAILY
Qty: 30 TABLET | Refills: 0 | Status: CANCELLED | OUTPATIENT
Start: 2021-10-22

## 2021-11-01 RX ORDER — FLECAINIDE ACETATE 100 MG/1
100 TABLET ORAL EVERY 12 HOURS
Qty: 60 TABLET | Refills: 0 | Status: SHIPPED | OUTPATIENT
Start: 2021-11-01 | End: 2021-11-29 | Stop reason: SDUPTHER

## 2021-11-05 DIAGNOSIS — Z94.0 KIDNEY REPLACED BY TRANSPLANT: Primary | ICD-10-CM

## 2021-11-09 ENCOUNTER — LAB VISIT (OUTPATIENT)
Dept: LAB | Facility: HOSPITAL | Age: 66
End: 2021-11-09
Attending: INTERNAL MEDICINE
Payer: COMMERCIAL

## 2021-11-09 ENCOUNTER — TELEPHONE (OUTPATIENT)
Dept: TRANSPLANT | Facility: CLINIC | Age: 66
End: 2021-11-09
Payer: COMMERCIAL

## 2021-11-09 DIAGNOSIS — Z94.0 KIDNEY REPLACED BY TRANSPLANT: Primary | ICD-10-CM

## 2021-11-09 DIAGNOSIS — Z94.0 KIDNEY REPLACED BY TRANSPLANT: ICD-10-CM

## 2021-11-09 LAB
ALBUMIN SERPL BCP-MCNC: 3.3 G/DL (ref 3.5–5.2)
ALBUMIN SERPL BCP-MCNC: 3.3 G/DL (ref 3.5–5.2)
ALP SERPL-CCNC: 59 U/L (ref 55–135)
ALT SERPL W/O P-5'-P-CCNC: 7 U/L (ref 10–44)
ANION GAP SERPL CALC-SCNC: 9 MMOL/L (ref 8–16)
AST SERPL-CCNC: 18 U/L (ref 10–40)
BASOPHILS # BLD AUTO: 0.02 K/UL (ref 0–0.2)
BASOPHILS NFR BLD: 0.4 % (ref 0–1.9)
BILIRUB DIRECT SERPL-MCNC: 0.2 MG/DL (ref 0.1–0.3)
BILIRUB SERPL-MCNC: 0.4 MG/DL (ref 0.1–1)
BUN SERPL-MCNC: 20 MG/DL (ref 8–23)
CALCIUM SERPL-MCNC: 9.4 MG/DL (ref 8.7–10.5)
CHLORIDE SERPL-SCNC: 101 MMOL/L (ref 95–110)
CO2 SERPL-SCNC: 30 MMOL/L (ref 23–29)
CREAT SERPL-MCNC: 1.5 MG/DL (ref 0.5–1.4)
DIFFERENTIAL METHOD: ABNORMAL
EOSINOPHIL # BLD AUTO: 0.1 K/UL (ref 0–0.5)
EOSINOPHIL NFR BLD: 1.9 % (ref 0–8)
ERYTHROCYTE [DISTWIDTH] IN BLOOD BY AUTOMATED COUNT: 17.2 % (ref 11.5–14.5)
EST. GFR  (AFRICAN AMERICAN): 56.1 ML/MIN/1.73 M^2
EST. GFR  (NON AFRICAN AMERICAN): 48.5 ML/MIN/1.73 M^2
GLUCOSE SERPL-MCNC: 191 MG/DL (ref 70–110)
HAPTOGLOB SERPL-MCNC: 82 MG/DL (ref 30–250)
HCT VFR BLD AUTO: 36.9 % (ref 40–54)
HGB BLD-MCNC: 10.7 G/DL (ref 14–18)
IMM GRANULOCYTES # BLD AUTO: 0.01 K/UL (ref 0–0.04)
IMM GRANULOCYTES NFR BLD AUTO: 0.2 % (ref 0–0.5)
LYMPHOCYTES # BLD AUTO: 1 K/UL (ref 1–4.8)
LYMPHOCYTES NFR BLD: 21.7 % (ref 18–48)
MAGNESIUM SERPL-MCNC: 1.5 MG/DL (ref 1.6–2.6)
MCH RBC QN AUTO: 23.4 PG (ref 27–31)
MCHC RBC AUTO-ENTMCNC: 29 G/DL (ref 32–36)
MCV RBC AUTO: 81 FL (ref 82–98)
MONOCYTES # BLD AUTO: 0.9 K/UL (ref 0.3–1)
MONOCYTES NFR BLD: 19.2 % (ref 4–15)
NEUTROPHILS # BLD AUTO: 2.7 K/UL (ref 1.8–7.7)
NEUTROPHILS NFR BLD: 56.6 % (ref 38–73)
NRBC BLD-RTO: 0 /100 WBC
PHOSPHATE SERPL-MCNC: 3.4 MG/DL (ref 2.7–4.5)
PLATELET # BLD AUTO: 116 K/UL (ref 150–450)
PMV BLD AUTO: ABNORMAL FL (ref 9.2–12.9)
POTASSIUM SERPL-SCNC: 4.1 MMOL/L (ref 3.5–5.1)
PROT SERPL-MCNC: 6.2 G/DL (ref 6–8.4)
RBC # BLD AUTO: 4.57 M/UL (ref 4.6–6.2)
SODIUM SERPL-SCNC: 140 MMOL/L (ref 136–145)
TACROLIMUS BLD-MCNC: 17.2 NG/ML (ref 5–15)
WBC # BLD AUTO: 4.75 K/UL (ref 3.9–12.7)

## 2021-11-09 PROCEDURE — 36415 COLL VENOUS BLD VENIPUNCTURE: CPT | Performed by: INTERNAL MEDICINE

## 2021-11-09 PROCEDURE — 80197 ASSAY OF TACROLIMUS: CPT | Performed by: INTERNAL MEDICINE

## 2021-11-09 PROCEDURE — 84460 ALANINE AMINO (ALT) (SGPT): CPT | Performed by: INTERNAL MEDICINE

## 2021-11-09 PROCEDURE — 80069 RENAL FUNCTION PANEL: CPT | Performed by: INTERNAL MEDICINE

## 2021-11-09 PROCEDURE — 83010 ASSAY OF HAPTOGLOBIN QUANT: CPT | Performed by: INTERNAL MEDICINE

## 2021-11-09 PROCEDURE — 85025 COMPLETE CBC W/AUTO DIFF WBC: CPT | Performed by: INTERNAL MEDICINE

## 2021-11-09 PROCEDURE — 83735 ASSAY OF MAGNESIUM: CPT | Performed by: INTERNAL MEDICINE

## 2021-11-09 PROCEDURE — 84075 ASSAY ALKALINE PHOSPHATASE: CPT | Performed by: INTERNAL MEDICINE

## 2021-11-09 PROCEDURE — 87799 DETECT AGENT NOS DNA QUANT: CPT | Performed by: INTERNAL MEDICINE

## 2021-11-10 ENCOUNTER — TELEPHONE (OUTPATIENT)
Dept: TRANSPLANT | Facility: CLINIC | Age: 66
End: 2021-11-10
Payer: COMMERCIAL

## 2021-11-10 ENCOUNTER — LAB VISIT (OUTPATIENT)
Dept: LAB | Facility: HOSPITAL | Age: 66
End: 2021-11-10
Attending: INTERNAL MEDICINE
Payer: COMMERCIAL

## 2021-11-10 DIAGNOSIS — Z94.0 KIDNEY REPLACED BY TRANSPLANT: ICD-10-CM

## 2021-11-10 LAB
LDH SERPL L TO P-CCNC: 220 U/L (ref 110–260)
TACROLIMUS BLD-MCNC: 9.7 NG/ML (ref 5–15)

## 2021-11-10 PROCEDURE — 83615 LACTATE (LD) (LDH) ENZYME: CPT | Performed by: INTERNAL MEDICINE

## 2021-11-10 PROCEDURE — 80197 ASSAY OF TACROLIMUS: CPT | Performed by: INTERNAL MEDICINE

## 2021-11-10 PROCEDURE — 36415 COLL VENOUS BLD VENIPUNCTURE: CPT | Performed by: INTERNAL MEDICINE

## 2021-11-11 ENCOUNTER — TELEPHONE (OUTPATIENT)
Dept: INTERNAL MEDICINE | Facility: CLINIC | Age: 66
End: 2021-11-11

## 2021-11-11 NOTE — TELEPHONE ENCOUNTER
Is he currently hospitalized?   If so, they should be monitoring and adjusting medications if needed.

## 2021-11-11 NOTE — TELEPHONE ENCOUNTER
Spoke with pt, his pressure was 188/86 on this morning, he did not go to urgent care, or take any special meds besides his regular medication.     I agreed to give pt a call back with instructions from the provider.

## 2021-11-11 NOTE — TELEPHONE ENCOUNTER
----- Message from Amber Cain RN sent at 11/10/2021  1:45 PM CST -----  Regarding: Pt requesting appt  Good Afternoon,    I was speaking with patient today and he mentioned that his blood pressure was starting to run higher than it had been. He said he needed to make an appointment with Dr Mack and asked if I could have someone call him to set this up.    Thank you,  Amber

## 2021-11-11 NOTE — TELEPHONE ENCOUNTER
How high is BP running?  Please schedule with me or next available provider if needs to be seen sooner.

## 2021-11-11 NOTE — TELEPHONE ENCOUNTER
Spoke with pt, there was no upcoming/sooner appointment for him. Pt stated he will give us a call back on the 15th after he leaves the hospital.   Pt wanted an appointment with his pcp.

## 2021-11-12 ENCOUNTER — PATIENT OUTREACH (OUTPATIENT)
Dept: ADMINISTRATIVE | Facility: OTHER | Age: 66
End: 2021-11-12
Payer: COMMERCIAL

## 2021-11-15 ENCOUNTER — OFFICE VISIT (OUTPATIENT)
Dept: TRANSPLANT | Facility: CLINIC | Age: 66
End: 2021-11-15
Payer: COMMERCIAL

## 2021-11-15 ENCOUNTER — OFFICE VISIT (OUTPATIENT)
Dept: ELECTROPHYSIOLOGY | Facility: CLINIC | Age: 66
End: 2021-11-15
Payer: COMMERCIAL

## 2021-11-15 ENCOUNTER — TELEPHONE (OUTPATIENT)
Dept: INTERNAL MEDICINE | Facility: CLINIC | Age: 66
End: 2021-11-15
Payer: COMMERCIAL

## 2021-11-15 VITALS
DIASTOLIC BLOOD PRESSURE: 75 MMHG | SYSTOLIC BLOOD PRESSURE: 160 MMHG | BODY MASS INDEX: 27.37 KG/M2 | OXYGEN SATURATION: 97 % | HEART RATE: 66 BPM | TEMPERATURE: 98 F | WEIGHT: 206.56 LBS | HEIGHT: 73 IN

## 2021-11-15 VITALS
HEART RATE: 50 BPM | SYSTOLIC BLOOD PRESSURE: 160 MMHG | HEIGHT: 73 IN | DIASTOLIC BLOOD PRESSURE: 84 MMHG | BODY MASS INDEX: 27.37 KG/M2 | WEIGHT: 206.56 LBS

## 2021-11-15 DIAGNOSIS — Z94.0 S/P KIDNEY TRANSPLANT: ICD-10-CM

## 2021-11-15 DIAGNOSIS — Z79.4 TYPE 2 DIABETES MELLITUS WITH STAGE 2 CHRONIC KIDNEY DISEASE, WITH LONG-TERM CURRENT USE OF INSULIN: ICD-10-CM

## 2021-11-15 DIAGNOSIS — I10 PRIMARY HYPERTENSION: ICD-10-CM

## 2021-11-15 DIAGNOSIS — I49.8 OTHER SPECIFIED CARDIAC ARRHYTHMIAS: ICD-10-CM

## 2021-11-15 DIAGNOSIS — Z94.0 KIDNEY REPLACED BY TRANSPLANT: ICD-10-CM

## 2021-11-15 DIAGNOSIS — Z79.60 LONG-TERM USE OF IMMUNOSUPPRESSANT MEDICATION: ICD-10-CM

## 2021-11-15 DIAGNOSIS — N18.2 TYPE 2 DIABETES MELLITUS WITH STAGE 2 CHRONIC KIDNEY DISEASE, WITH LONG-TERM CURRENT USE OF INSULIN: ICD-10-CM

## 2021-11-15 DIAGNOSIS — N18.2 CKD (CHRONIC KIDNEY DISEASE) STAGE 2, GFR 60-89 ML/MIN: Chronic | ICD-10-CM

## 2021-11-15 DIAGNOSIS — Z94.0 S/P KIDNEY TRANSPLANT: Primary | ICD-10-CM

## 2021-11-15 DIAGNOSIS — Z79.899 ENCOUNTER FOR MONITORING FLECAINIDE THERAPY: ICD-10-CM

## 2021-11-15 DIAGNOSIS — E11.22 TYPE 2 DIABETES MELLITUS WITH STAGE 2 CHRONIC KIDNEY DISEASE, WITH LONG-TERM CURRENT USE OF INSULIN: ICD-10-CM

## 2021-11-15 DIAGNOSIS — Z51.81 ENCOUNTER FOR MONITORING FLECAINIDE THERAPY: ICD-10-CM

## 2021-11-15 DIAGNOSIS — I48.19 PERSISTENT ATRIAL FIBRILLATION: Primary | ICD-10-CM

## 2021-11-15 PROCEDURE — 93010 ELECTROCARDIOGRAM REPORT: CPT | Mod: S$GLB,,, | Performed by: INTERNAL MEDICINE

## 2021-11-15 PROCEDURE — 93005 RHYTHM STRIP: ICD-10-PCS | Mod: S$GLB,,, | Performed by: NURSE PRACTITIONER

## 2021-11-15 PROCEDURE — 99999 PR PBB SHADOW E&M-EST. PATIENT-LVL III: CPT | Mod: PBBFAC,,, | Performed by: NURSE PRACTITIONER

## 2021-11-15 PROCEDURE — 99999 PR PBB SHADOW E&M-EST. PATIENT-LVL IV: ICD-10-PCS | Mod: PBBFAC,,, | Performed by: INTERNAL MEDICINE

## 2021-11-15 PROCEDURE — 4010F PR ACE/ARB THEARPY RXD/TAKEN: ICD-10-PCS | Mod: CPTII,S$GLB,, | Performed by: INTERNAL MEDICINE

## 2021-11-15 PROCEDURE — 3008F PR BODY MASS INDEX (BMI) DOCUMENTED: ICD-10-PCS | Mod: CPTII,S$GLB,, | Performed by: INTERNAL MEDICINE

## 2021-11-15 PROCEDURE — 4010F ACE/ARB THERAPY RXD/TAKEN: CPT | Mod: CPTII,S$GLB,, | Performed by: INTERNAL MEDICINE

## 2021-11-15 PROCEDURE — 3079F PR MOST RECENT DIASTOLIC BLOOD PRESSURE 80-89 MM HG: ICD-10-PCS | Mod: CPTII,S$GLB,, | Performed by: INTERNAL MEDICINE

## 2021-11-15 PROCEDURE — 99999 PR PBB SHADOW E&M-EST. PATIENT-LVL IV: CPT | Mod: PBBFAC,,, | Performed by: INTERNAL MEDICINE

## 2021-11-15 PROCEDURE — 3066F PR DOCUMENTATION OF TREATMENT FOR NEPHROPATHY: ICD-10-PCS | Mod: CPTII,S$GLB,, | Performed by: INTERNAL MEDICINE

## 2021-11-15 PROCEDURE — 99214 PR OFFICE/OUTPT VISIT, EST, LEVL IV, 30-39 MIN: ICD-10-PCS | Mod: S$GLB,,, | Performed by: INTERNAL MEDICINE

## 2021-11-15 PROCEDURE — 3077F SYST BP >= 140 MM HG: CPT | Mod: CPTII,S$GLB,, | Performed by: INTERNAL MEDICINE

## 2021-11-15 PROCEDURE — 3079F DIAST BP 80-89 MM HG: CPT | Mod: CPTII,S$GLB,, | Performed by: INTERNAL MEDICINE

## 2021-11-15 PROCEDURE — 99214 OFFICE O/P EST MOD 30 MIN: CPT | Mod: S$GLB,,, | Performed by: INTERNAL MEDICINE

## 2021-11-15 PROCEDURE — 99213 OFFICE O/P EST LOW 20 MIN: CPT | Mod: PBBFAC | Performed by: NURSE PRACTITIONER

## 2021-11-15 PROCEDURE — 1159F MED LIST DOCD IN RCRD: CPT | Mod: CPTII,S$GLB,, | Performed by: INTERNAL MEDICINE

## 2021-11-15 PROCEDURE — 3077F PR MOST RECENT SYSTOLIC BLOOD PRESSURE >= 140 MM HG: ICD-10-PCS | Mod: CPTII,S$GLB,, | Performed by: INTERNAL MEDICINE

## 2021-11-15 PROCEDURE — 3008F BODY MASS INDEX DOCD: CPT | Mod: CPTII,S$GLB,, | Performed by: INTERNAL MEDICINE

## 2021-11-15 PROCEDURE — 93010 RHYTHM STRIP: ICD-10-PCS | Mod: S$GLB,,, | Performed by: INTERNAL MEDICINE

## 2021-11-15 PROCEDURE — 3051F PR MOST RECENT HEMOGLOBIN A1C LEVEL 7.0 - < 8.0%: ICD-10-PCS | Mod: CPTII,S$GLB,, | Performed by: INTERNAL MEDICINE

## 2021-11-15 PROCEDURE — 3066F NEPHROPATHY DOC TX: CPT | Mod: CPTII,S$GLB,, | Performed by: INTERNAL MEDICINE

## 2021-11-15 PROCEDURE — 3072F PR LOW RISK FOR RETINOPATHY: ICD-10-PCS | Mod: CPTII,S$GLB,, | Performed by: INTERNAL MEDICINE

## 2021-11-15 PROCEDURE — 1159F PR MEDICATION LIST DOCUMENTED IN MEDICAL RECORD: ICD-10-PCS | Mod: CPTII,S$GLB,, | Performed by: INTERNAL MEDICINE

## 2021-11-15 PROCEDURE — 93005 ELECTROCARDIOGRAM TRACING: CPT | Mod: S$GLB,,, | Performed by: NURSE PRACTITIONER

## 2021-11-15 PROCEDURE — 3051F HG A1C>EQUAL 7.0%<8.0%: CPT | Mod: CPTII,S$GLB,, | Performed by: INTERNAL MEDICINE

## 2021-11-15 PROCEDURE — 3072F LOW RISK FOR RETINOPATHY: CPT | Mod: CPTII,S$GLB,, | Performed by: INTERNAL MEDICINE

## 2021-11-15 PROCEDURE — 99215 PR OFFICE/OUTPT VISIT, EST, LEVL V, 40-54 MIN: ICD-10-PCS | Mod: S$GLB,,, | Performed by: NURSE PRACTITIONER

## 2021-11-15 PROCEDURE — 99215 OFFICE O/P EST HI 40 MIN: CPT | Mod: S$GLB,,, | Performed by: NURSE PRACTITIONER

## 2021-11-15 PROCEDURE — 99999 PR PBB SHADOW E&M-EST. PATIENT-LVL III: ICD-10-PCS | Mod: PBBFAC,,, | Performed by: NURSE PRACTITIONER

## 2021-11-15 RX ORDER — PREDNISONE 5 MG/1
5 TABLET ORAL DAILY
Qty: 30 TABLET | Refills: 11 | Status: SHIPPED | OUTPATIENT
Start: 2021-11-15 | End: 2022-11-16 | Stop reason: SDUPTHER

## 2021-11-15 RX ORDER — MYCOPHENOLATE MOFETIL 250 MG/1
1000 CAPSULE ORAL 2 TIMES DAILY
Qty: 240 CAPSULE | Refills: 11 | Status: SHIPPED | OUTPATIENT
Start: 2021-11-15 | End: 2022-11-16 | Stop reason: SDUPTHER

## 2021-11-29 RX ORDER — FLECAINIDE ACETATE 100 MG/1
100 TABLET ORAL EVERY 12 HOURS
Qty: 60 TABLET | Refills: 0 | Status: SHIPPED | OUTPATIENT
Start: 2021-11-29 | End: 2021-12-29 | Stop reason: SDUPTHER

## 2021-12-14 RX ORDER — INSULIN GLARGINE 100 [IU]/ML
INJECTION, SOLUTION SUBCUTANEOUS
Qty: 12 ML | Refills: 6 | Status: SHIPPED | OUTPATIENT
Start: 2021-12-14 | End: 2023-02-22

## 2021-12-15 ENCOUNTER — OFFICE VISIT (OUTPATIENT)
Dept: INTERNAL MEDICINE | Facility: CLINIC | Age: 66
End: 2021-12-15
Payer: COMMERCIAL

## 2021-12-15 VITALS
OXYGEN SATURATION: 99 % | BODY MASS INDEX: 28.5 KG/M2 | WEIGHT: 215.06 LBS | DIASTOLIC BLOOD PRESSURE: 70 MMHG | HEART RATE: 54 BPM | SYSTOLIC BLOOD PRESSURE: 160 MMHG | HEIGHT: 73 IN

## 2021-12-15 DIAGNOSIS — N18.2 TYPE 2 DIABETES MELLITUS WITH STAGE 2 CHRONIC KIDNEY DISEASE, WITH LONG-TERM CURRENT USE OF INSULIN: ICD-10-CM

## 2021-12-15 DIAGNOSIS — E11.22 TYPE 2 DIABETES MELLITUS WITH STAGE 2 CHRONIC KIDNEY DISEASE, WITH LONG-TERM CURRENT USE OF INSULIN: ICD-10-CM

## 2021-12-15 DIAGNOSIS — I10 HYPERTENSION, UNSPECIFIED TYPE: Primary | ICD-10-CM

## 2021-12-15 DIAGNOSIS — F41.9 ANXIETY: ICD-10-CM

## 2021-12-15 DIAGNOSIS — N18.2 CKD (CHRONIC KIDNEY DISEASE) STAGE 2, GFR 60-89 ML/MIN: Chronic | ICD-10-CM

## 2021-12-15 DIAGNOSIS — N18.2 CHRONIC KIDNEY DISEASE, STAGE II (MILD): Primary | ICD-10-CM

## 2021-12-15 DIAGNOSIS — E78.5 HYPERLIPIDEMIA, UNSPECIFIED HYPERLIPIDEMIA TYPE: ICD-10-CM

## 2021-12-15 DIAGNOSIS — Z79.4 TYPE 2 DIABETES MELLITUS WITH STAGE 2 CHRONIC KIDNEY DISEASE, WITH LONG-TERM CURRENT USE OF INSULIN: ICD-10-CM

## 2021-12-15 DIAGNOSIS — Z94.0 S/P KIDNEY TRANSPLANT: ICD-10-CM

## 2021-12-15 PROCEDURE — 99999 PR PBB SHADOW E&M-EST. PATIENT-LVL V: ICD-10-PCS | Mod: PBBFAC,,, | Performed by: PHYSICIAN ASSISTANT

## 2021-12-15 PROCEDURE — 99214 OFFICE O/P EST MOD 30 MIN: CPT | Mod: S$GLB,,, | Performed by: PHYSICIAN ASSISTANT

## 2021-12-15 PROCEDURE — 4010F ACE/ARB THERAPY RXD/TAKEN: CPT | Mod: CPTII,S$GLB,, | Performed by: PHYSICIAN ASSISTANT

## 2021-12-15 PROCEDURE — 99999 PR PBB SHADOW E&M-EST. PATIENT-LVL V: CPT | Mod: PBBFAC,,, | Performed by: PHYSICIAN ASSISTANT

## 2021-12-15 PROCEDURE — 99214 PR OFFICE/OUTPT VISIT, EST, LEVL IV, 30-39 MIN: ICD-10-PCS | Mod: S$GLB,,, | Performed by: PHYSICIAN ASSISTANT

## 2021-12-15 PROCEDURE — 3072F LOW RISK FOR RETINOPATHY: CPT | Mod: CPTII,S$GLB,, | Performed by: PHYSICIAN ASSISTANT

## 2021-12-15 PROCEDURE — 3066F PR DOCUMENTATION OF TREATMENT FOR NEPHROPATHY: ICD-10-PCS | Mod: CPTII,S$GLB,, | Performed by: PHYSICIAN ASSISTANT

## 2021-12-15 PROCEDURE — 4010F PR ACE/ARB THEARPY RXD/TAKEN: ICD-10-PCS | Mod: CPTII,S$GLB,, | Performed by: PHYSICIAN ASSISTANT

## 2021-12-15 PROCEDURE — 3066F NEPHROPATHY DOC TX: CPT | Mod: CPTII,S$GLB,, | Performed by: PHYSICIAN ASSISTANT

## 2021-12-15 PROCEDURE — 3072F PR LOW RISK FOR RETINOPATHY: ICD-10-PCS | Mod: CPTII,S$GLB,, | Performed by: PHYSICIAN ASSISTANT

## 2021-12-15 RX ORDER — HYDRALAZINE HYDROCHLORIDE 25 MG/1
25 TABLET, FILM COATED ORAL EVERY 12 HOURS
Qty: 60 TABLET | Refills: 2 | Status: SHIPPED | OUTPATIENT
Start: 2021-12-15 | End: 2022-03-16 | Stop reason: SDUPTHER

## 2021-12-30 ENCOUNTER — LAB VISIT (OUTPATIENT)
Dept: LAB | Facility: HOSPITAL | Age: 66
End: 2021-12-30
Attending: INTERNAL MEDICINE
Payer: COMMERCIAL

## 2021-12-30 DIAGNOSIS — Z94.0 KIDNEY REPLACED BY TRANSPLANT: ICD-10-CM

## 2021-12-30 LAB — TACROLIMUS BLD-MCNC: 10.8 NG/ML (ref 5–15)

## 2021-12-30 PROCEDURE — 36415 COLL VENOUS BLD VENIPUNCTURE: CPT | Performed by: INTERNAL MEDICINE

## 2021-12-30 PROCEDURE — 80197 ASSAY OF TACROLIMUS: CPT | Performed by: INTERNAL MEDICINE

## 2021-12-30 RX ORDER — TACROLIMUS 0.5 MG/1
0.5 CAPSULE ORAL EVERY 12 HOURS
Qty: 60 CAPSULE | Refills: 11 | Status: SHIPPED | OUTPATIENT
Start: 2021-12-30 | End: 2022-12-15

## 2021-12-30 RX ORDER — FLECAINIDE ACETATE 100 MG/1
100 TABLET ORAL EVERY 12 HOURS
Qty: 60 TABLET | Refills: 0 | Status: SHIPPED | OUTPATIENT
Start: 2021-12-30 | End: 2022-01-25 | Stop reason: SDUPTHER

## 2022-01-04 ENCOUNTER — PATIENT OUTREACH (OUTPATIENT)
Dept: ADMINISTRATIVE | Facility: OTHER | Age: 67
End: 2022-01-04
Payer: COMMERCIAL

## 2022-01-05 ENCOUNTER — TELEPHONE (OUTPATIENT)
Dept: PHARMACY | Facility: CLINIC | Age: 67
End: 2022-01-05
Payer: COMMERCIAL

## 2022-01-05 ENCOUNTER — LAB VISIT (OUTPATIENT)
Dept: LAB | Facility: HOSPITAL | Age: 67
End: 2022-01-05
Attending: INTERNAL MEDICINE
Payer: COMMERCIAL

## 2022-01-05 DIAGNOSIS — N18.2 CHRONIC KIDNEY DISEASE, STAGE II (MILD): ICD-10-CM

## 2022-01-05 LAB
ALBUMIN SERPL BCP-MCNC: 3.2 G/DL (ref 3.5–5.2)
ANION GAP SERPL CALC-SCNC: 9 MMOL/L (ref 8–16)
BUN SERPL-MCNC: 20 MG/DL (ref 8–23)
CALCIUM SERPL-MCNC: 9.3 MG/DL (ref 8.7–10.5)
CHLORIDE SERPL-SCNC: 99 MMOL/L (ref 95–110)
CO2 SERPL-SCNC: 29 MMOL/L (ref 23–29)
CREAT SERPL-MCNC: 1.4 MG/DL (ref 0.5–1.4)
EST. GFR  (AFRICAN AMERICAN): >60 ML/MIN/1.73 M^2
EST. GFR  (NON AFRICAN AMERICAN): 52.7 ML/MIN/1.73 M^2
GLUCOSE SERPL-MCNC: 153 MG/DL (ref 70–110)
PHOSPHATE SERPL-MCNC: 3 MG/DL (ref 2.7–4.5)
POTASSIUM SERPL-SCNC: 4.1 MMOL/L (ref 3.5–5.1)
SODIUM SERPL-SCNC: 137 MMOL/L (ref 136–145)

## 2022-01-05 PROCEDURE — 80069 RENAL FUNCTION PANEL: CPT | Performed by: INTERNAL MEDICINE

## 2022-01-05 PROCEDURE — 36415 COLL VENOUS BLD VENIPUNCTURE: CPT | Performed by: INTERNAL MEDICINE

## 2022-01-07 ENCOUNTER — OFFICE VISIT (OUTPATIENT)
Dept: NEPHROLOGY | Facility: CLINIC | Age: 67
End: 2022-01-07
Payer: COMMERCIAL

## 2022-01-07 VITALS
WEIGHT: 200.81 LBS | BODY MASS INDEX: 26.62 KG/M2 | SYSTOLIC BLOOD PRESSURE: 142 MMHG | DIASTOLIC BLOOD PRESSURE: 76 MMHG | OXYGEN SATURATION: 100 % | HEART RATE: 56 BPM | HEIGHT: 73 IN

## 2022-01-07 DIAGNOSIS — N18.2 CKD (CHRONIC KIDNEY DISEASE) STAGE 2, GFR 60-89 ML/MIN: Chronic | ICD-10-CM

## 2022-01-07 DIAGNOSIS — Z94.0 S/P KIDNEY TRANSPLANT: ICD-10-CM

## 2022-01-07 PROCEDURE — 1159F MED LIST DOCD IN RCRD: CPT | Mod: CPTII,S$GLB,, | Performed by: INTERNAL MEDICINE

## 2022-01-07 PROCEDURE — 3066F PR DOCUMENTATION OF TREATMENT FOR NEPHROPATHY: ICD-10-PCS | Mod: CPTII,S$GLB,, | Performed by: INTERNAL MEDICINE

## 2022-01-07 PROCEDURE — 3078F PR MOST RECENT DIASTOLIC BLOOD PRESSURE < 80 MM HG: ICD-10-PCS | Mod: CPTII,S$GLB,, | Performed by: INTERNAL MEDICINE

## 2022-01-07 PROCEDURE — 3008F PR BODY MASS INDEX (BMI) DOCUMENTED: ICD-10-PCS | Mod: CPTII,S$GLB,, | Performed by: INTERNAL MEDICINE

## 2022-01-07 PROCEDURE — 99999 PR PBB SHADOW E&M-EST. PATIENT-LVL IV: CPT | Mod: PBBFAC,,, | Performed by: INTERNAL MEDICINE

## 2022-01-07 PROCEDURE — 3077F SYST BP >= 140 MM HG: CPT | Mod: CPTII,S$GLB,, | Performed by: INTERNAL MEDICINE

## 2022-01-07 PROCEDURE — 3077F PR MOST RECENT SYSTOLIC BLOOD PRESSURE >= 140 MM HG: ICD-10-PCS | Mod: CPTII,S$GLB,, | Performed by: INTERNAL MEDICINE

## 2022-01-07 PROCEDURE — 99999 PR PBB SHADOW E&M-EST. PATIENT-LVL IV: ICD-10-PCS | Mod: PBBFAC,,, | Performed by: INTERNAL MEDICINE

## 2022-01-07 PROCEDURE — 3066F NEPHROPATHY DOC TX: CPT | Mod: CPTII,S$GLB,, | Performed by: INTERNAL MEDICINE

## 2022-01-07 PROCEDURE — 99214 OFFICE O/P EST MOD 30 MIN: CPT | Mod: S$GLB,,, | Performed by: INTERNAL MEDICINE

## 2022-01-07 PROCEDURE — 99214 PR OFFICE/OUTPT VISIT, EST, LEVL IV, 30-39 MIN: ICD-10-PCS | Mod: S$GLB,,, | Performed by: INTERNAL MEDICINE

## 2022-01-07 PROCEDURE — 3008F BODY MASS INDEX DOCD: CPT | Mod: CPTII,S$GLB,, | Performed by: INTERNAL MEDICINE

## 2022-01-07 PROCEDURE — 1159F PR MEDICATION LIST DOCUMENTED IN MEDICAL RECORD: ICD-10-PCS | Mod: CPTII,S$GLB,, | Performed by: INTERNAL MEDICINE

## 2022-01-07 PROCEDURE — 3078F DIAST BP <80 MM HG: CPT | Mod: CPTII,S$GLB,, | Performed by: INTERNAL MEDICINE

## 2022-01-07 NOTE — PROGRESS NOTES
Subjective:       Patient ID: Ravi Zamorano is a 66 y.o. Black or  male who presents for re-evaluation of progression of Chronic Kidney Disease and Kidney Transplant    HPI Mr. Zamorano is a 66 year old man with medical history of diabetes, hypertension, ESRD previously on HD status post  donor kidney transplant (HCV positive donor, patient received Harvoni) 2016 presenting for follow up of allograft function and immunosuppression.  Patient reports doing well, has adequate fluid intake, blood sugars previously elevated, now better controlled.  He reports blood pressure usually well-controlled at home.  He otherwise denies any fever, chest pain, shortness of breath, abdominal pain, diarrhea, dysuria/hematuria.  He denies any other recent acute illness/infections/injury.    Review of Systems   Constitutional: Negative for appetite change, fatigue and fever.   Respiratory: Negative for cough and shortness of breath.    Cardiovascular: Negative for chest pain and leg swelling.   Gastrointestinal: Negative for abdominal pain, constipation, diarrhea, nausea and vomiting.   Genitourinary: Negative for dysuria, flank pain, frequency, hematuria and urgency.   Musculoskeletal: Negative for arthralgias, back pain and joint swelling.   Skin: Negative for rash.   Neurological: Negative for dizziness and light-headedness.   All other systems reviewed and are negative.      Objective:      Physical Exam  Vitals reviewed.   Constitutional:       General: He is not in acute distress.     Appearance: He is well-developed.   Pulmonary:      Effort: Pulmonary effort is normal. No respiratory distress.   Musculoskeletal:         General: No swelling.   Neurological:      Mental Status: He is alert.         Assessment:       1. CKD (chronic kidney disease) stage 2, GFR 60-89 ml/min    2. S/P cadaveric kidney transplant 2016. ESRD secondary to HTN/DMII        Plan:     Mr. Zamorano is a 66 year  old man with medical history of diabetes, hypertension, ESRD previously on HD status post  donor kidney transplant (HCV positive donor, patient received Harvoni) 2016 presenting for follow up of allograft function and immunosuppression.  Patient creatinine elevated since last visit, though improving, within baseline range, will continue to trend with increase in fluid intake and recent reduction in tacrolimus per Transplant (along with proteinuria, will trend with better glycemic control).  Stressed importance of blood pressure/glycemic control to prevent any further progression of kidney disease, patient voiced understanding.   - Immunosuppression: per Transplant, will trend tacrolimus level after recent reduction  - Anemia: Hgb at goal, no indication for erythropoetin therapy  - Bone/mineral metabolism: patient with secondary hyperparathyroidism, PTH previously at goal for stage CKD, will trend PTH/VitD    Return to clinic in 6 months with renal/heme panel, iron/TIBC/ferritin, urinalysis/culture, urine protein/creatinine ratio, PTH/VitD prior to next visit

## 2022-01-25 DIAGNOSIS — E78.5 HYPERLIPIDEMIA, UNSPECIFIED HYPERLIPIDEMIA TYPE: ICD-10-CM

## 2022-01-25 DIAGNOSIS — I10 ESSENTIAL HYPERTENSION: ICD-10-CM

## 2022-01-25 RX ORDER — INSULIN LISPRO 100 [IU]/ML
INJECTION, SOLUTION INTRAVENOUS; SUBCUTANEOUS
Qty: 30 ML | Refills: 4 | Status: SHIPPED | OUTPATIENT
Start: 2022-01-25 | End: 2023-01-02 | Stop reason: SDUPTHER

## 2022-01-25 RX ORDER — HYDROCHLOROTHIAZIDE 12.5 MG/1
12.5 TABLET ORAL DAILY
Qty: 90 TABLET | Refills: 1 | Status: SHIPPED | OUTPATIENT
Start: 2022-01-25 | End: 2022-07-12

## 2022-01-25 RX ORDER — FLECAINIDE ACETATE 100 MG/1
100 TABLET ORAL EVERY 12 HOURS
Qty: 60 TABLET | Refills: 0 | Status: SHIPPED | OUTPATIENT
Start: 2022-01-25 | End: 2022-02-24 | Stop reason: SDUPTHER

## 2022-01-25 RX ORDER — ATORVASTATIN CALCIUM 40 MG/1
40 TABLET, FILM COATED ORAL DAILY
Qty: 90 TABLET | Refills: 3 | Status: SHIPPED | OUTPATIENT
Start: 2022-01-25 | End: 2023-01-02 | Stop reason: SDUPTHER

## 2022-01-25 NOTE — TELEPHONE ENCOUNTER
Care Due:                  Date            Visit Type   Department     Provider  --------------------------------------------------------------------------------                                             [facility]  Last Visit: 02-      None         MEDICINE       CARRIE BUSTILLOS  Next Visit: None Scheduled  None         None Found                                                            Last  Test          Frequency    Reason                     Performed    Due Date  --------------------------------------------------------------------------------    Office Visit  12 months..  atorvastatin,              02- 01-                             hydroCHLOROthiazide,                             valsartan................    Powered by [employer]. Reference number: [id number].   1/25/2022 11:36:43 AM CST

## 2022-02-03 ENCOUNTER — PATIENT OUTREACH (OUTPATIENT)
Dept: ADMINISTRATIVE | Facility: HOSPITAL | Age: 67
End: 2022-02-03
Payer: COMMERCIAL

## 2022-02-07 ENCOUNTER — LAB VISIT (OUTPATIENT)
Dept: LAB | Facility: HOSPITAL | Age: 67
End: 2022-02-07
Attending: INTERNAL MEDICINE
Payer: COMMERCIAL

## 2022-02-07 DIAGNOSIS — N18.2 CKD (CHRONIC KIDNEY DISEASE) STAGE 2, GFR 60-89 ML/MIN: Chronic | ICD-10-CM

## 2022-02-07 LAB
ALBUMIN SERPL BCP-MCNC: 3.1 G/DL (ref 3.5–5.2)
ANION GAP SERPL CALC-SCNC: 9 MMOL/L (ref 8–16)
BASOPHILS # BLD AUTO: 0.03 K/UL (ref 0–0.2)
BASOPHILS NFR BLD: 0.6 % (ref 0–1.9)
BUN SERPL-MCNC: 25 MG/DL (ref 8–23)
CALCIUM SERPL-MCNC: 9.3 MG/DL (ref 8.7–10.5)
CHLORIDE SERPL-SCNC: 98 MMOL/L (ref 95–110)
CO2 SERPL-SCNC: 29 MMOL/L (ref 23–29)
CREAT SERPL-MCNC: 1.6 MG/DL (ref 0.5–1.4)
DIFFERENTIAL METHOD: ABNORMAL
EOSINOPHIL # BLD AUTO: 0.1 K/UL (ref 0–0.5)
EOSINOPHIL NFR BLD: 1.7 % (ref 0–8)
ERYTHROCYTE [DISTWIDTH] IN BLOOD BY AUTOMATED COUNT: 17.4 % (ref 11.5–14.5)
EST. GFR  (AFRICAN AMERICAN): 51.9 ML/MIN/1.73 M^2
EST. GFR  (NON AFRICAN AMERICAN): 44.9 ML/MIN/1.73 M^2
FERRITIN SERPL-MCNC: 73 NG/ML (ref 20–300)
GLUCOSE SERPL-MCNC: 182 MG/DL (ref 70–110)
HCT VFR BLD AUTO: 39.7 % (ref 40–54)
HGB BLD-MCNC: 11.5 G/DL (ref 14–18)
IMM GRANULOCYTES # BLD AUTO: 0.02 K/UL (ref 0–0.04)
IMM GRANULOCYTES NFR BLD AUTO: 0.4 % (ref 0–0.5)
IRON SERPL-MCNC: 120 UG/DL (ref 45–160)
LYMPHOCYTES # BLD AUTO: 1.1 K/UL (ref 1–4.8)
LYMPHOCYTES NFR BLD: 20.3 % (ref 18–48)
MCH RBC QN AUTO: 23.6 PG (ref 27–31)
MCHC RBC AUTO-ENTMCNC: 29 G/DL (ref 32–36)
MCV RBC AUTO: 81 FL (ref 82–98)
MONOCYTES # BLD AUTO: 1.2 K/UL (ref 0.3–1)
MONOCYTES NFR BLD: 22.9 % (ref 4–15)
NEUTROPHILS # BLD AUTO: 2.9 K/UL (ref 1.8–7.7)
NEUTROPHILS NFR BLD: 54.1 % (ref 38–73)
NRBC BLD-RTO: 0 /100 WBC
PHOSPHATE SERPL-MCNC: 3.4 MG/DL (ref 2.7–4.5)
PLATELET # BLD AUTO: 122 K/UL (ref 150–450)
PMV BLD AUTO: ABNORMAL FL (ref 9.2–12.9)
POTASSIUM SERPL-SCNC: 3.9 MMOL/L (ref 3.5–5.1)
PTH-INTACT SERPL-MCNC: 145 PG/ML (ref 9–77)
RBC # BLD AUTO: 4.88 M/UL (ref 4.6–6.2)
SATURATED IRON: 42 % (ref 20–50)
SODIUM SERPL-SCNC: 136 MMOL/L (ref 136–145)
TOTAL IRON BINDING CAPACITY: 286 UG/DL (ref 250–450)
TRANSFERRIN SERPL-MCNC: 193 MG/DL (ref 200–375)
WBC # BLD AUTO: 5.41 K/UL (ref 3.9–12.7)

## 2022-02-07 PROCEDURE — 80197 ASSAY OF TACROLIMUS: CPT | Performed by: INTERNAL MEDICINE

## 2022-02-07 PROCEDURE — 83970 ASSAY OF PARATHORMONE: CPT | Performed by: INTERNAL MEDICINE

## 2022-02-07 PROCEDURE — 36415 COLL VENOUS BLD VENIPUNCTURE: CPT | Performed by: INTERNAL MEDICINE

## 2022-02-07 PROCEDURE — 85025 COMPLETE CBC W/AUTO DIFF WBC: CPT | Performed by: INTERNAL MEDICINE

## 2022-02-07 PROCEDURE — 82728 ASSAY OF FERRITIN: CPT | Performed by: INTERNAL MEDICINE

## 2022-02-07 PROCEDURE — 80069 RENAL FUNCTION PANEL: CPT | Performed by: INTERNAL MEDICINE

## 2022-02-07 PROCEDURE — 84466 ASSAY OF TRANSFERRIN: CPT | Performed by: INTERNAL MEDICINE

## 2022-02-08 LAB — TACROLIMUS BLD-MCNC: 10.6 NG/ML (ref 5–15)

## 2022-02-25 RX ORDER — FLECAINIDE ACETATE 100 MG/1
100 TABLET ORAL EVERY 12 HOURS
Qty: 60 TABLET | Refills: 0 | Status: SHIPPED | OUTPATIENT
Start: 2022-02-25 | End: 2022-03-16 | Stop reason: SDUPTHER

## 2022-03-16 DIAGNOSIS — I10 HYPERTENSION, UNSPECIFIED TYPE: ICD-10-CM

## 2022-03-16 RX ORDER — CARVEDILOL 25 MG/1
25 TABLET ORAL 2 TIMES DAILY
Qty: 180 TABLET | Refills: 3 | Status: SHIPPED | OUTPATIENT
Start: 2022-03-16 | End: 2023-03-22 | Stop reason: SDUPTHER

## 2022-03-16 RX ORDER — FLECAINIDE ACETATE 100 MG/1
100 TABLET ORAL EVERY 12 HOURS
Qty: 60 TABLET | Refills: 0 | Status: SHIPPED | OUTPATIENT
Start: 2022-03-16 | End: 2022-04-29 | Stop reason: SDUPTHER

## 2022-03-17 RX ORDER — HYDRALAZINE HYDROCHLORIDE 25 MG/1
25 TABLET, FILM COATED ORAL EVERY 12 HOURS
Qty: 60 TABLET | Refills: 11 | Status: ON HOLD | OUTPATIENT
Start: 2022-03-17 | End: 2022-07-12 | Stop reason: HOSPADM

## 2022-04-06 ENCOUNTER — PATIENT OUTREACH (OUTPATIENT)
Dept: ADMINISTRATIVE | Facility: OTHER | Age: 67
End: 2022-04-06
Payer: COMMERCIAL

## 2022-04-07 ENCOUNTER — OFFICE VISIT (OUTPATIENT)
Dept: OPTOMETRY | Facility: CLINIC | Age: 67
End: 2022-04-07
Payer: COMMERCIAL

## 2022-04-07 DIAGNOSIS — Z01.00 EYE EXAM, ROUTINE: Primary | ICD-10-CM

## 2022-04-07 DIAGNOSIS — N18.2 TYPE 2 DIABETES MELLITUS WITH STAGE 2 CHRONIC KIDNEY DISEASE, WITH LONG-TERM CURRENT USE OF INSULIN: ICD-10-CM

## 2022-04-07 DIAGNOSIS — H52.223 HYPEROPIA WITH REGULAR ASTIGMATISM AND PRESBYOPIA, BILATERAL: ICD-10-CM

## 2022-04-07 DIAGNOSIS — Z79.4 TYPE 2 DIABETES MELLITUS WITH STAGE 2 CHRONIC KIDNEY DISEASE, WITH LONG-TERM CURRENT USE OF INSULIN: ICD-10-CM

## 2022-04-07 DIAGNOSIS — E11.22 TYPE 2 DIABETES MELLITUS WITH STAGE 2 CHRONIC KIDNEY DISEASE, WITH LONG-TERM CURRENT USE OF INSULIN: ICD-10-CM

## 2022-04-07 DIAGNOSIS — H52.03 HYPEROPIA WITH REGULAR ASTIGMATISM AND PRESBYOPIA, BILATERAL: ICD-10-CM

## 2022-04-07 DIAGNOSIS — H52.4 HYPEROPIA WITH REGULAR ASTIGMATISM AND PRESBYOPIA, BILATERAL: ICD-10-CM

## 2022-04-07 PROCEDURE — 92014 PR EYE EXAM, EST PATIENT,COMPREHESV: ICD-10-PCS | Mod: S$GLB,,, | Performed by: OPTOMETRIST

## 2022-04-07 PROCEDURE — 99999 PR PBB SHADOW E&M-EST. PATIENT-LVL IV: ICD-10-PCS | Mod: PBBFAC,,, | Performed by: OPTOMETRIST

## 2022-04-07 PROCEDURE — 99999 PR PBB SHADOW E&M-EST. PATIENT-LVL IV: CPT | Mod: PBBFAC,,, | Performed by: OPTOMETRIST

## 2022-04-07 PROCEDURE — 92015 DETERMINE REFRACTIVE STATE: CPT | Mod: S$GLB,,, | Performed by: OPTOMETRIST

## 2022-04-07 PROCEDURE — 92014 COMPRE OPH EXAM EST PT 1/>: CPT | Mod: S$GLB,,, | Performed by: OPTOMETRIST

## 2022-04-07 PROCEDURE — 92015 PR REFRACTION: ICD-10-PCS | Mod: S$GLB,,, | Performed by: OPTOMETRIST

## 2022-04-07 NOTE — PROGRESS NOTES
Requested updates within Care Everywhere.  Patient's chart was reviewed for overdue MOOKIE topics.  Health maintenance:updated  Immunizations:reconciled   Legacy:   Media:  Orders placed:  Tasked appts:  Labs Linked:  Upcoming appt:Optometry 4/7/22

## 2022-04-07 NOTE — PROGRESS NOTES
HPI     Annual Diabetic eye exam  Patient complaints of pain behind the eyes and floaters ou   (-)Flashes (+)Floaters  (+)Itch, (+)tear, (+)burn, (+)Dryness. (+) OTC Drops Visine  (-)Photophobia  (-)Glare (-)diplopia (-) headaches        Hemoglobin A1C       Date                     Value               Ref Range             Status                11/10/2020               7.6 (H)             4.0 - 5.6 %           Final                 09/11/2020               7.1 (H)             4.0 - 5.6 %           Final                  01/20/2020               7.1 (H)             4.0 - 5.6 %           Final                Forget glasses at home  (-)Flashes +-)Floaters  (-)Itch, (-)tear, (-)burn, (-)Dryness. (+) OTC Drops   (-)Photophobia  (--)Glare (-)diplopia (-) headaches          Last edited by Gabriel Radford, OD on 4/7/2022 10:22 AM. (History)            Assessment /Plan     For exam results, see Encounter Report.    Eye exam, routine  Type 2 diabetes mellitus with stage 2 chronic kidney disease, with long-term current use of insulin  -     Ambulatory referral/consult to Optometry  -No retinopathy noted today.  Continued control with primary care physician and annual comprehensive eye exam.  -Spectera  -GLC Suspect stable with prior, monitor as low risk  -Stop Visine, Switch Systane Complete PRN    Hyperopia with regular astigmatism and presbyopia, bilateral  Eyeglass Final Rx     Eyeglass Final Rx       Sphere Cylinder Axis Dist VA Add    Right +1.50 +0.50 005 20/20-- +2.50    Left +0.75 +0.50 180 20/30 +2.50    Type: PAL    Expiration Date: 4/7/2023                  RTC 1 yr

## 2022-04-30 RX ORDER — FLECAINIDE ACETATE 100 MG/1
100 TABLET ORAL EVERY 12 HOURS
Qty: 60 TABLET | Refills: 0 | Status: SHIPPED | OUTPATIENT
Start: 2022-04-30 | End: 2022-05-25 | Stop reason: SDUPTHER

## 2022-05-17 ENCOUNTER — DOCUMENTATION ONLY (OUTPATIENT)
Dept: TRANSPLANT | Facility: CLINIC | Age: 67
End: 2022-05-17
Payer: COMMERCIAL

## 2022-05-23 RX ORDER — FLECAINIDE ACETATE 100 MG/1
100 TABLET ORAL EVERY 12 HOURS
Qty: 60 TABLET | Refills: 0 | Status: CANCELLED | OUTPATIENT
Start: 2022-05-23

## 2022-05-25 RX ORDER — FLECAINIDE ACETATE 100 MG/1
100 TABLET ORAL EVERY 12 HOURS
Qty: 60 TABLET | Refills: 0 | OUTPATIENT
Start: 2022-05-25

## 2022-05-25 NOTE — TELEPHONE ENCOUNTER
Provider Staff:     Action required for this patient.    Please note Refusal of medication.            Requested Prescriptions     Refused Prescriptions Disp Refills    flecainide (TAMBOCOR) 100 MG Tab 60 tablet 0     Sig: Take 1 tablet (100 mg total) by mouth every 12 (twelve) hours.     Refused By: THEODORA CHRISTIANSON     Reason for Refusal: Other (See comments)      Thanks!  [facility]   Note composed: 05/25/2022 4:07 PM

## 2022-05-25 NOTE — TELEPHONE ENCOUNTER
Refill Routing Note   Medication(s) are not appropriate for processing by [facility] for the following reason(s):      - Outside of protocol    [facility] action(s):  Route          Medication reconciliation completed: No     Appointments  past 12m or future 3m with PCP    Date Provider   Last Visit   2/4/2021 Mima Mack MD   Next Visit   Visit date not found Mima Mack MD   ED visits in past 90 days: 0        Note composed:1:15 PM 05/25/2022

## 2022-05-26 RX ORDER — FLECAINIDE ACETATE 100 MG/1
100 TABLET ORAL EVERY 12 HOURS
Qty: 60 TABLET | Refills: 0 | Status: SHIPPED | OUTPATIENT
Start: 2022-05-26 | End: 2022-07-06 | Stop reason: SDUPTHER

## 2022-06-23 DIAGNOSIS — I10 ESSENTIAL HYPERTENSION: ICD-10-CM

## 2022-06-23 NOTE — TELEPHONE ENCOUNTER
Care Due:                  Date            Visit Type   Department     Provider  --------------------------------------------------------------------------------                                EP -                              PRIMARY      [facility]  Last Visit: 02-      [facility]   MEDICINE       CARRIE BUSTILLOS  Next Visit: None Scheduled  None         None Found                                                            Last  Test          Frequency    Reason                     Performed    Due Date  --------------------------------------------------------------------------------    Office Visit  12 months..  atorvastatin,              02- 01-                             hydroCHLOROthiazide,                             valsartan................    Lipid Panel.  12 months..  atorvastatin.............  06- 06-    [facility] Embedded Care Gaps. Reference number: [id number]. 6/23/2022   4:47:25 PM CDT

## 2022-06-24 RX ORDER — VALSARTAN 320 MG/1
320 TABLET ORAL DAILY
Qty: 90 TABLET | Refills: 3 | Status: SHIPPED | OUTPATIENT
Start: 2022-06-24 | End: 2023-06-13 | Stop reason: SDUPTHER

## 2022-07-06 RX ORDER — FLECAINIDE ACETATE 100 MG/1
100 TABLET ORAL EVERY 12 HOURS
Qty: 60 TABLET | Refills: 0 | Status: SHIPPED | OUTPATIENT
Start: 2022-07-06 | End: 2022-07-26 | Stop reason: SDUPTHER

## 2022-07-07 ENCOUNTER — TELEPHONE (OUTPATIENT)
Dept: INTERNAL MEDICINE | Facility: CLINIC | Age: 67
End: 2022-07-07
Payer: COMMERCIAL

## 2022-07-07 DIAGNOSIS — U07.1 COVID: ICD-10-CM

## 2022-07-07 DIAGNOSIS — U07.1 COVID-19: Primary | ICD-10-CM

## 2022-07-07 NOTE — TELEPHONE ENCOUNTER
----- Message from Glenys Ramirez sent at 7/7/2022  3:38 PM CDT -----  Contact: [phone]  Pt states he has covid he tested today and he states he is asking for the pill please give return call

## 2022-07-08 ENCOUNTER — OFFICE VISIT (OUTPATIENT)
Dept: URGENT CARE | Facility: CLINIC | Age: 67
End: 2022-07-08
Payer: COMMERCIAL

## 2022-07-08 VITALS
RESPIRATION RATE: 16 BRPM | SYSTOLIC BLOOD PRESSURE: 167 MMHG | HEIGHT: 73 IN | HEART RATE: 58 BPM | OXYGEN SATURATION: 99 % | DIASTOLIC BLOOD PRESSURE: 68 MMHG | WEIGHT: 200 LBS | BODY MASS INDEX: 26.51 KG/M2 | TEMPERATURE: 99 F

## 2022-07-08 DIAGNOSIS — U07.1 COVID-19: Primary | ICD-10-CM

## 2022-07-08 LAB
CTP QC/QA: YES
SARS-COV-2 RDRP RESP QL NAA+PROBE: POSITIVE

## 2022-07-08 PROCEDURE — 3078F PR MOST RECENT DIASTOLIC BLOOD PRESSURE < 80 MM HG: ICD-10-PCS | Mod: CPTII,S$GLB,, | Performed by: NURSE PRACTITIONER

## 2022-07-08 PROCEDURE — 3066F NEPHROPATHY DOC TX: CPT | Mod: CPTII,S$GLB,, | Performed by: NURSE PRACTITIONER

## 2022-07-08 PROCEDURE — 4010F PR ACE/ARB THEARPY RXD/TAKEN: ICD-10-PCS | Mod: CPTII,S$GLB,, | Performed by: NURSE PRACTITIONER

## 2022-07-08 PROCEDURE — 3066F PR DOCUMENTATION OF TREATMENT FOR NEPHROPATHY: ICD-10-PCS | Mod: CPTII,S$GLB,, | Performed by: NURSE PRACTITIONER

## 2022-07-08 PROCEDURE — 99214 OFFICE O/P EST MOD 30 MIN: CPT | Mod: GT,S$GLB,, | Performed by: NURSE PRACTITIONER

## 2022-07-08 PROCEDURE — 3008F BODY MASS INDEX DOCD: CPT | Mod: CPTII,S$GLB,, | Performed by: NURSE PRACTITIONER

## 2022-07-08 PROCEDURE — U0002 COVID-19 LAB TEST NON-CDC: HCPCS | Mod: QW,S$GLB,, | Performed by: NURSE PRACTITIONER

## 2022-07-08 PROCEDURE — 3078F DIAST BP <80 MM HG: CPT | Mod: CPTII,S$GLB,, | Performed by: NURSE PRACTITIONER

## 2022-07-08 PROCEDURE — 4010F ACE/ARB THERAPY RXD/TAKEN: CPT | Mod: CPTII,S$GLB,, | Performed by: NURSE PRACTITIONER

## 2022-07-08 PROCEDURE — 99214 PR OFFICE/OUTPT VISIT, EST, LEVL IV, 30-39 MIN: ICD-10-PCS | Mod: GT,S$GLB,, | Performed by: NURSE PRACTITIONER

## 2022-07-08 PROCEDURE — 3051F PR MOST RECENT HEMOGLOBIN A1C LEVEL 7.0 - < 8.0%: ICD-10-PCS | Mod: CPTII,S$GLB,, | Performed by: NURSE PRACTITIONER

## 2022-07-08 PROCEDURE — 3077F SYST BP >= 140 MM HG: CPT | Mod: CPTII,S$GLB,, | Performed by: NURSE PRACTITIONER

## 2022-07-08 PROCEDURE — 3077F PR MOST RECENT SYSTOLIC BLOOD PRESSURE >= 140 MM HG: ICD-10-PCS | Mod: CPTII,S$GLB,, | Performed by: NURSE PRACTITIONER

## 2022-07-08 PROCEDURE — 3051F HG A1C>EQUAL 7.0%<8.0%: CPT | Mod: CPTII,S$GLB,, | Performed by: NURSE PRACTITIONER

## 2022-07-08 PROCEDURE — U0002: ICD-10-PCS | Mod: QW,S$GLB,, | Performed by: NURSE PRACTITIONER

## 2022-07-08 PROCEDURE — 3008F PR BODY MASS INDEX (BMI) DOCUMENTED: ICD-10-PCS | Mod: CPTII,S$GLB,, | Performed by: NURSE PRACTITIONER

## 2022-07-08 NOTE — PATIENT INSTRUCTIONS
You have tested positive for COVID-19 today.       ISOLATION  If you tested positive and do not have symptoms, you must isolate for 5 days starting on the day of the positive test. I     If you tested positive and have symptoms, you must isolate for 5 days starting on the day of the first symptoms,  not the day of the positive test.     This is the most important part, both the CDC and the [facility] emphasize that you do not test out of isolation.     After 5 days, if your symptoms have improved and you have not had fever on day 5, you can return to the community on day 6- NO TESTING REQUIRED!      In fact, we do not retest if you were positive in the last 90 days.     After your 5 days of isolation are completed, the CDC recommends strict mask use for the first 5 days that you come out of isolation.        CDC Testing and Quarantine Guidelines for patients with exposure to a known-positive COVID-19 person:  ·     A 'close exposure' is defined as anyone who has had an exposure (masked or unmasked) to a known COVID -19 positive person          within 6 feet of someone          for a cumulative total of 15 minutes or more over a 24-hour period.  ·     vaccinated Have been boosted or completed the primary series of Pfizer or Moderna vaccine within the last 6 months or completed the primary series of J&J vaccine within the last 2 months and/or had a positive test within 90 days          do NOT need to quarantine after contact with someone who had COVID-19 unless they have symptoms.          fully vaccinated people who have not had a positive test within 90 days, should get tested 3-5 days after their exposure, even if they don't have symptoms and wear a mask indoors in public for 10 days following exposure or until their test result is negative on day 5.          If you develop symptoms test and quarantine.     ·     Unvaccinated, or are more than six months out from their second mRNA dose (or more than 2 months after the J&J  vaccine) and not yet boosted,  and/or NOT had a positive test within 90 days and meet 'close exposure'  you are required by CDC guidelines to quarantine for at least 5 days from time of exposure followed by 5 days of strict masking. It is recommended, but not required to test after 5 days, unless you develop symptoms, in which case you should test at that time.  If you do decide to test at 5 days and are asymptomatic, the risk is that if you test without symptoms on Day 5 for example) and you are positive, your 5 day isolation begins on that day, and you turned your 5 day quarantine into 10 days.          If your exposure does not meet the above definition, you can return to your normal daily activities to include social distancing, wearing a mask and frequent handwashing.  Alternatively, if a 5-day quarantine is not feasible, it is imperative that an exposed person wear a well-fitting mask at all times when around others for 10 days after exposure.

## 2022-07-08 NOTE — PROGRESS NOTES
"Subjective:       Patient ID: Ravi Zamorano is a 66 y.o. male.    Vitals:  height is 6' 1" (1.854 m) and weight is 90.7 kg (200 lb). His temperature is 98.7 °F (37.1 °C). His blood pressure is 167/68 (abnormal) and his pulse is 58 (abnormal). His respiration is 16 and oxygen saturation is 99%.     Chief Complaint: COVID-19 Concerns    67yo male presents with c/o cough and rhinorrhea. Positive at home COVID test. Patient states he went to [facility] over the weekend.    URI   This is a new problem. The current episode started in the past 7 days (Monday). The problem has been unchanged. There has been no fever. Associated symptoms include coughing, diarrhea (x1), headaches (intermittent) and rhinorrhea. Pertinent negatives include no abdominal pain, chest pain, congestion, dysuria, ear pain, joint pain, joint swelling, nausea, neck pain, plugged ear sensation, rash, sinus pain, sneezing, sore throat, swollen glands, vomiting or wheezing. He has tried nothing for the symptoms.       Constitution: Positive for sweating.   HENT: Negative for ear pain, congestion, sinus pain and sore throat.    Neck: Negative for neck pain.   Cardiovascular: Negative for chest pain.   Respiratory: Positive for cough. Negative for wheezing.    Gastrointestinal: Positive for diarrhea (x1). Negative for abdominal pain, nausea and vomiting.   Genitourinary: Negative for dysuria.   Skin: Negative for rash.   Allergic/Immunologic: Negative for sneezing.   Neurological: Positive for dizziness and headaches (intermittent).       Objective:    \  The patient location is: [facility]  The chief complaint leading to consultation is: cough/rhinorrhea     Visit type: audiovisual    Face to Face time with patient: 25  35 minutes of total time spent on the encounter, which includes face to face time and non-face to face time preparing to see the patient (eg, review of tests), Obtaining and/or reviewing separately obtained history, " Documenting clinical information in the electronic or other health record, Independently interpreting results (not separately reported) and communicating results to the patient/family/caregiver, or Care coordination (not separately reported).         Each patient to whom he or she provides medical services by telemedicine is:  (1) informed of the relationship between the physician and patient and the respective role of any other health care provider with respect to management of the patient; and (2) notified that he or she may decline to receive medical services by telemedicine and may withdraw from such care at any time.    Notes:     Physical Exam   Constitutional: He is oriented to person, place, and time. normal  HENT:   Head: Normocephalic and atraumatic.   Nose: Nose normal.   Eyes: Conjunctivae are normal. Pupils are equal, round, and reactive to light. Extraocular movement intact   Neck: Neck supple.   Cardiovascular: Normal rate, regular rhythm, normal heart sounds and normal pulses.   Pulmonary/Chest: Effort normal and breath sounds normal.   Abdominal: Normal appearance.   Neurological: He is alert and oriented to person, place, and time.   Psychiatric: His behavior is normal. Mood, judgment and thought content normal.   Nursing note and vitals reviewed.        Results for orders placed or performed in visit on 07/08/22   POCT COVID-19 Rapid Screening   Result Value Ref Range    POC Rapid COVID Positive (A) Negative     Acceptable Yes      *Note: Due to a large number of results and/or encounters for the requested time period, some results have not been displayed. A complete set of results can be found in Results Review.       Assessment:       1. COVID-19          Plan:       6-patient is a high risk score. History of kidney transplant  Call made to scheduling to see if they could reach out to patient whether he is a candidate since paxlovid is contraindicated. He is to follow up closely  with any acute changes.       COVID-19  -     POCT COVID-19 Rapid Screening  -     Ambulatory referral/consult to [facility]                   Patient Instructions   You have tested positive for COVID-19 today.       ISOLATION  If you tested positive and do not have symptoms, you must isolate for 5 days starting on the day of the positive test. I     If you tested positive and have symptoms, you must isolate for 5 days starting on the day of the first symptoms,  not the day of the positive test.     This is the most important part, both the CDC and the [facility] emphasize that you do not test out of isolation.     After 5 days, if your symptoms have improved and you have not had fever on day 5, you can return to the community on day 6- NO TESTING REQUIRED!      In fact, we do not retest if you were positive in the last 90 days.     After your 5 days of isolation are completed, the CDC recommends strict mask use for the first 5 days that you come out of isolation.        CDC Testing and Quarantine Guidelines for patients with exposure to a known-positive COVID-19 person:  ·     A 'close exposure' is defined as anyone who has had an exposure (masked or unmasked) to a known COVID -19 positive person          within 6 feet of someone          for a cumulative total of 15 minutes or more over a 24-hour period.  ·     vaccinated Have been boosted or completed the primary series of Pfizer or Moderna vaccine within the last 6 months or completed the primary series of J&J vaccine within the last 2 months and/or had a positive test within 90 days          do NOT need to quarantine after contact with someone who had COVID-19 unless they have symptoms.          fully vaccinated people who have not had a positive test within 90 days, should get tested 3-5 days after their exposure, even if they don't have symptoms and wear a mask indoors in public for 10 days following exposure or until their test result is negative on day 5.           If you develop symptoms test and quarantine.     ·     Unvaccinated, or are more than six months out from their second mRNA dose (or more than 2 months after the J&J vaccine) and not yet boosted,  and/or NOT had a positive test within 90 days and meet 'close exposure'  you are required by CDC guidelines to quarantine for at least 5 days from time of exposure followed by 5 days of strict masking. It is recommended, but not required to test after 5 days, unless you develop symptoms, in which case you should test at that time.  If you do decide to test at 5 days and are asymptomatic, the risk is that if you test without symptoms on Day 5 for example) and you are positive, your 5 day isolation begins on that day, and you turned your 5 day quarantine into 10 days.          If your exposure does not meet the above definition, you can return to your normal daily activities to include social distancing, wearing a mask and frequent handwashing.  Alternatively, if a 5-day quarantine is not feasible, it is imperative that an exposed person wear a well-fitting mask at all times when around others for 10 days after exposure.

## 2022-07-08 NOTE — TELEPHONE ENCOUNTER
Thank you for your healthcare question. I am coordinating your care with a team of [facility] pharmacists who will help get you on the most appropriate COVID-19 treatment. They will reach out to you in the next 24 to 48 hours.    paxlovid may not be best choice for him since he is on tacrolimus.

## 2022-07-08 NOTE — LETTER
July 8, 2022      Urgent Care [address] [address]  Phone: [phone]  Fax: [phone]       Patient: Ravi Zamorano   YOB: 1955  Date of Visit: 07/08/2022    To Whom It May Concern:    Emmett Zamorano  was at [facility] on 07/08/2022. The patient may return to work/school on *** {With/no:21384} restrictions. If you have any questions or concerns, or if I can be of further assistance, please do not hesitate to contact me.    Sincerely,    Lacey Cooley NP

## 2022-07-10 ENCOUNTER — HOSPITAL ENCOUNTER (INPATIENT)
Facility: HOSPITAL | Age: 67
LOS: 2 days | Discharge: HOME | DRG: 178 | End: 2022-07-12
Attending: EMERGENCY MEDICINE | Admitting: STUDENT IN AN ORGANIZED HEALTH CARE EDUCATION/TRAINING PROGRAM
Payer: COMMERCIAL

## 2022-07-10 DIAGNOSIS — I50.9 ACUTE ON CHRONIC HEART FAILURE, UNSPECIFIED HEART FAILURE TYPE: Primary | ICD-10-CM

## 2022-07-10 DIAGNOSIS — I10 HYPERTENSION, UNSPECIFIED TYPE: ICD-10-CM

## 2022-07-10 DIAGNOSIS — U07.1 COVID-19 VIRUS INFECTION: ICD-10-CM

## 2022-07-10 DIAGNOSIS — R06.02 SHORTNESS OF BREATH: ICD-10-CM

## 2022-07-10 DIAGNOSIS — I50.9 CHF (CONGESTIVE HEART FAILURE): ICD-10-CM

## 2022-07-10 DIAGNOSIS — Z94.0 KIDNEY TRANSPLANTED: ICD-10-CM

## 2022-07-10 PROBLEM — E87.6 HYPOKALEMIA: Status: ACTIVE | Noted: 2022-07-10

## 2022-07-10 PROBLEM — R79.89 ELEVATED TROPONIN I LEVEL: Status: ACTIVE | Noted: 2022-07-10

## 2022-07-10 PROBLEM — R79.89 HIGH SERUM LACTATE: Status: ACTIVE | Noted: 2022-07-10

## 2022-07-10 LAB
ALBUMIN SERPL BCP-MCNC: 2.6 G/DL (ref 3.5–5.2)
ALP SERPL-CCNC: 51 U/L (ref 55–135)
ALT SERPL W/O P-5'-P-CCNC: 5 U/L (ref 10–44)
ANION GAP SERPL CALC-SCNC: 14 MMOL/L (ref 8–16)
AST SERPL-CCNC: 14 U/L (ref 10–40)
BACTERIA #/AREA URNS AUTO: NORMAL /HPF
BASOPHILS # BLD AUTO: 0.01 K/UL (ref 0–0.2)
BASOPHILS NFR BLD: 0.2 % (ref 0–1.9)
BILIRUB SERPL-MCNC: 0.6 MG/DL (ref 0.1–1)
BILIRUB UR QL STRIP: NEGATIVE
BNP SERPL-MCNC: 3087 PG/ML (ref 0–99)
BUN SERPL-MCNC: 27 MG/DL (ref 8–23)
CALCIUM SERPL-MCNC: 8.8 MG/DL (ref 8.7–10.5)
CHLORIDE SERPL-SCNC: 96 MMOL/L (ref 95–110)
CK SERPL-CCNC: 83 U/L (ref 20–200)
CLARITY UR REFRACT.AUTO: CLEAR
CO2 SERPL-SCNC: 24 MMOL/L (ref 23–29)
COLOR UR AUTO: YELLOW
CREAT SERPL-MCNC: 1.9 MG/DL (ref 0.5–1.4)
CREAT UR-MCNC: 115 MG/DL (ref 23–375)
CREAT UR-MCNC: 115 MG/DL (ref 23–375)
CRP SERPL-MCNC: 40.5 MG/L (ref 0–8.2)
D DIMER PPP IA.FEU-MCNC: 0.45 MG/L FEU
DIFFERENTIAL METHOD: ABNORMAL
EOSINOPHIL # BLD AUTO: 0 K/UL (ref 0–0.5)
EOSINOPHIL NFR BLD: 0.3 % (ref 0–8)
ERYTHROCYTE [DISTWIDTH] IN BLOOD BY AUTOMATED COUNT: 15.4 % (ref 11.5–14.5)
EST. GFR  (AFRICAN AMERICAN): 42.1 ML/MIN/1.73 M^2
EST. GFR  (NON AFRICAN AMERICAN): 36.4 ML/MIN/1.73 M^2
FERRITIN SERPL-MCNC: 148 NG/ML (ref 20–300)
GLUCOSE SERPL-MCNC: 99 MG/DL (ref 70–110)
GLUCOSE UR QL STRIP: NEGATIVE
HCT VFR BLD AUTO: 38 % (ref 40–54)
HGB BLD-MCNC: 11.5 G/DL (ref 14–18)
HGB UR QL STRIP: NEGATIVE
HYALINE CASTS UR QL AUTO: 0 /LPF
IMM GRANULOCYTES # BLD AUTO: 0.01 K/UL (ref 0–0.04)
IMM GRANULOCYTES NFR BLD AUTO: 0.2 % (ref 0–0.5)
IRON SERPL-MCNC: 14 UG/DL (ref 45–160)
KETONES UR QL STRIP: NEGATIVE
LACTATE SERPL-SCNC: 3 MMOL/L (ref 0.5–2.2)
LACTATE SERPL-SCNC: 3.1 MMOL/L (ref 0.5–2.2)
LDH SERPL L TO P-CCNC: 221 U/L (ref 110–260)
LEUKOCYTE ESTERASE UR QL STRIP: NEGATIVE
LYMPHOCYTES # BLD AUTO: 0.6 K/UL (ref 1–4.8)
LYMPHOCYTES NFR BLD: 9.7 % (ref 18–48)
MCH RBC QN AUTO: 24.4 PG (ref 27–31)
MCHC RBC AUTO-ENTMCNC: 30.3 G/DL (ref 32–36)
MCV RBC AUTO: 81 FL (ref 82–98)
MICROSCOPIC COMMENT: NORMAL
MONOCYTES # BLD AUTO: 1.1 K/UL (ref 0.3–1)
MONOCYTES NFR BLD: 19.1 % (ref 4–15)
NEUTROPHILS # BLD AUTO: 4.1 K/UL (ref 1.8–7.7)
NEUTROPHILS NFR BLD: 70.5 % (ref 38–73)
NITRITE UR QL STRIP: NEGATIVE
NRBC BLD-RTO: 0 /100 WBC
PH UR STRIP: 6 [PH] (ref 5–8)
PLATELET # BLD AUTO: 100 K/UL (ref 150–450)
PLATELET BLD QL SMEAR: ABNORMAL
PMV BLD AUTO: ABNORMAL FL (ref 9.2–12.9)
POCT GLUCOSE: 171 MG/DL (ref 70–110)
POCT GLUCOSE: 234 MG/DL (ref 70–110)
POTASSIUM SERPL-SCNC: 3 MMOL/L (ref 3.5–5.1)
PROT SERPL-MCNC: 5.9 G/DL (ref 6–8.4)
PROT UR QL STRIP: ABNORMAL
PROT UR-MCNC: 291 MG/DL (ref 0–15)
PROT/CREAT UR: 2.53 MG/G{CREAT} (ref 0–0.2)
RBC # BLD AUTO: 4.71 M/UL (ref 4.6–6.2)
RBC #/AREA URNS AUTO: 1 /HPF (ref 0–4)
SATURATED IRON: 6 % (ref 20–50)
SODIUM SERPL-SCNC: 134 MMOL/L (ref 136–145)
SODIUM UR-SCNC: 28 MMOL/L (ref 20–250)
SP GR UR STRIP: 1.01 (ref 1–1.03)
TOTAL IRON BINDING CAPACITY: 222 UG/DL (ref 250–450)
TRANSFERRIN SERPL-MCNC: 150 MG/DL (ref 200–375)
TROPONIN I SERPL DL<=0.01 NG/ML-MCNC: 0.27 NG/ML (ref 0–0.03)
URN SPEC COLLECT METH UR: ABNORMAL
UUN UR-MCNC: 439 MG/DL (ref 140–1050)
WBC # BLD AUTO: 5.77 K/UL (ref 3.9–12.7)
WBC #/AREA URNS AUTO: 1 /HPF (ref 0–5)

## 2022-07-10 PROCEDURE — 83605 ASSAY OF LACTIC ACID: CPT | Mod: 91 | Performed by: STUDENT IN AN ORGANIZED HEALTH CARE EDUCATION/TRAINING PROGRAM

## 2022-07-10 PROCEDURE — 85025 COMPLETE CBC W/AUTO DIFF WBC: CPT | Performed by: PHYSICIAN ASSISTANT

## 2022-07-10 PROCEDURE — 25000003 PHARM REV CODE 250: Performed by: STUDENT IN AN ORGANIZED HEALTH CARE EDUCATION/TRAINING PROGRAM

## 2022-07-10 PROCEDURE — 27000207 HC ISOLATION

## 2022-07-10 PROCEDURE — 83880 ASSAY OF NATRIURETIC PEPTIDE: CPT | Performed by: PHYSICIAN ASSISTANT

## 2022-07-10 PROCEDURE — 25000003 PHARM REV CODE 250: Performed by: PHYSICIAN ASSISTANT

## 2022-07-10 PROCEDURE — 84156 ASSAY OF PROTEIN URINE: CPT | Performed by: STUDENT IN AN ORGANIZED HEALTH CARE EDUCATION/TRAINING PROGRAM

## 2022-07-10 PROCEDURE — 93010 ELECTROCARDIOGRAM REPORT: CPT | Mod: ,,, | Performed by: INTERNAL MEDICINE

## 2022-07-10 PROCEDURE — 84540 ASSAY OF URINE/UREA-N: CPT | Performed by: STUDENT IN AN ORGANIZED HEALTH CARE EDUCATION/TRAINING PROGRAM

## 2022-07-10 PROCEDURE — 85379 FIBRIN DEGRADATION QUANT: CPT | Performed by: STUDENT IN AN ORGANIZED HEALTH CARE EDUCATION/TRAINING PROGRAM

## 2022-07-10 PROCEDURE — 83605 ASSAY OF LACTIC ACID: CPT | Performed by: PHYSICIAN ASSISTANT

## 2022-07-10 PROCEDURE — 63600175 PHARM REV CODE 636 W HCPCS: Performed by: STUDENT IN AN ORGANIZED HEALTH CARE EDUCATION/TRAINING PROGRAM

## 2022-07-10 PROCEDURE — 84300 ASSAY OF URINE SODIUM: CPT | Performed by: STUDENT IN AN ORGANIZED HEALTH CARE EDUCATION/TRAINING PROGRAM

## 2022-07-10 PROCEDURE — 99285 EMERGENCY DEPT VISIT HI MDM: CPT | Mod: 25

## 2022-07-10 PROCEDURE — 99284 EMERGENCY DEPT VISIT MOD MDM: CPT | Mod: CR,,, | Performed by: EMERGENCY MEDICINE

## 2022-07-10 PROCEDURE — 80053 COMPREHEN METABOLIC PANEL: CPT | Performed by: PHYSICIAN ASSISTANT

## 2022-07-10 PROCEDURE — 82728 ASSAY OF FERRITIN: CPT | Performed by: PHYSICIAN ASSISTANT

## 2022-07-10 PROCEDURE — 84484 ASSAY OF TROPONIN QUANT: CPT | Performed by: PHYSICIAN ASSISTANT

## 2022-07-10 PROCEDURE — 83615 LACTATE (LD) (LDH) ENZYME: CPT | Performed by: PHYSICIAN ASSISTANT

## 2022-07-10 PROCEDURE — 99223 PR INITIAL HOSPITAL CARE,LEVL III: ICD-10-PCS | Mod: CR,,, | Performed by: STUDENT IN AN ORGANIZED HEALTH CARE EDUCATION/TRAINING PROGRAM

## 2022-07-10 PROCEDURE — 12000002 HC ACUTE/MED SURGE SEMI-PRIVATE ROOM

## 2022-07-10 PROCEDURE — 84466 ASSAY OF TRANSFERRIN: CPT | Performed by: PHYSICIAN ASSISTANT

## 2022-07-10 PROCEDURE — C9399 UNCLASSIFIED DRUGS OR BIOLOG: HCPCS | Performed by: STUDENT IN AN ORGANIZED HEALTH CARE EDUCATION/TRAINING PROGRAM

## 2022-07-10 PROCEDURE — 81001 URINALYSIS AUTO W/SCOPE: CPT | Performed by: STUDENT IN AN ORGANIZED HEALTH CARE EDUCATION/TRAINING PROGRAM

## 2022-07-10 PROCEDURE — 80197 ASSAY OF TACROLIMUS: CPT | Performed by: STUDENT IN AN ORGANIZED HEALTH CARE EDUCATION/TRAINING PROGRAM

## 2022-07-10 PROCEDURE — 93010 EKG 12-LEAD: ICD-10-PCS | Mod: ,,, | Performed by: INTERNAL MEDICINE

## 2022-07-10 PROCEDURE — 86140 C-REACTIVE PROTEIN: CPT | Performed by: PHYSICIAN ASSISTANT

## 2022-07-10 PROCEDURE — 93005 ELECTROCARDIOGRAM TRACING: CPT

## 2022-07-10 PROCEDURE — 99284 PR EMERGENCY DEPT VISIT,LEVEL IV: ICD-10-PCS | Mod: CR,,, | Performed by: EMERGENCY MEDICINE

## 2022-07-10 PROCEDURE — 82550 ASSAY OF CK (CPK): CPT | Performed by: PHYSICIAN ASSISTANT

## 2022-07-10 PROCEDURE — 99223 1ST HOSP IP/OBS HIGH 75: CPT | Mod: CR,,, | Performed by: STUDENT IN AN ORGANIZED HEALTH CARE EDUCATION/TRAINING PROGRAM

## 2022-07-10 RX ORDER — SODIUM CHLORIDE 0.9 % (FLUSH) 0.9 %
10 SYRINGE (ML) INJECTION
Status: DISCONTINUED | OUTPATIENT
Start: 2022-07-10 | End: 2022-07-12 | Stop reason: HOSPADM

## 2022-07-10 RX ORDER — HYDRALAZINE HYDROCHLORIDE 50 MG/1
50 TABLET, FILM COATED ORAL EVERY 8 HOURS
Status: DISCONTINUED | OUTPATIENT
Start: 2022-07-10 | End: 2022-07-11

## 2022-07-10 RX ORDER — FAMOTIDINE 20 MG/1
20 TABLET, FILM COATED ORAL NIGHTLY
Status: DISCONTINUED | OUTPATIENT
Start: 2022-07-10 | End: 2022-07-12 | Stop reason: HOSPADM

## 2022-07-10 RX ORDER — FLECAINIDE ACETATE 100 MG/1
100 TABLET ORAL EVERY 12 HOURS
Status: DISCONTINUED | OUTPATIENT
Start: 2022-07-10 | End: 2022-07-12 | Stop reason: HOSPADM

## 2022-07-10 RX ORDER — HYDRALAZINE HYDROCHLORIDE 25 MG/1
25 TABLET, FILM COATED ORAL EVERY 12 HOURS
Status: DISCONTINUED | OUTPATIENT
Start: 2022-07-10 | End: 2022-07-10

## 2022-07-10 RX ORDER — ALBUTEROL SULFATE 90 UG/1
2 AEROSOL, METERED RESPIRATORY (INHALATION) EVERY 6 HOURS PRN
Status: DISCONTINUED | OUTPATIENT
Start: 2022-07-10 | End: 2022-07-12 | Stop reason: HOSPADM

## 2022-07-10 RX ORDER — MYCOPHENOLATE MOFETIL 250 MG/1
1000 CAPSULE ORAL 2 TIMES DAILY
Status: DISCONTINUED | OUTPATIENT
Start: 2022-07-10 | End: 2022-07-11

## 2022-07-10 RX ORDER — TACROLIMUS 0.5 MG/1
0.5 CAPSULE ORAL 2 TIMES DAILY
Status: DISCONTINUED | OUTPATIENT
Start: 2022-07-10 | End: 2022-07-12 | Stop reason: HOSPADM

## 2022-07-10 RX ORDER — MECLIZINE HYDROCHLORIDE 25 MG/1
25 TABLET ORAL 3 TIMES DAILY PRN
Status: DISCONTINUED | OUTPATIENT
Start: 2022-07-10 | End: 2022-07-12 | Stop reason: HOSPADM

## 2022-07-10 RX ORDER — NAPROXEN SODIUM 220 MG/1
81 TABLET, FILM COATED ORAL DAILY
Status: DISCONTINUED | OUTPATIENT
Start: 2022-07-10 | End: 2022-07-12 | Stop reason: HOSPADM

## 2022-07-10 RX ORDER — CARVEDILOL 25 MG/1
25 TABLET ORAL 2 TIMES DAILY
Status: DISCONTINUED | OUTPATIENT
Start: 2022-07-10 | End: 2022-07-12 | Stop reason: HOSPADM

## 2022-07-10 RX ORDER — INSULIN ASPART 100 [IU]/ML
0-5 INJECTION, SOLUTION INTRAVENOUS; SUBCUTANEOUS
Status: DISCONTINUED | OUTPATIENT
Start: 2022-07-10 | End: 2022-07-12 | Stop reason: HOSPADM

## 2022-07-10 RX ORDER — HYDRALAZINE HYDROCHLORIDE 20 MG/ML
10 INJECTION INTRAMUSCULAR; INTRAVENOUS EVERY 6 HOURS PRN
Status: DISCONTINUED | OUTPATIENT
Start: 2022-07-10 | End: 2022-07-11

## 2022-07-10 RX ORDER — PREDNISONE 5 MG/1
5 TABLET ORAL DAILY
Status: DISCONTINUED | OUTPATIENT
Start: 2022-07-10 | End: 2022-07-12 | Stop reason: HOSPADM

## 2022-07-10 RX ORDER — GLUCAGON 1 MG
1 KIT INJECTION
Status: DISCONTINUED | OUTPATIENT
Start: 2022-07-10 | End: 2022-07-12 | Stop reason: HOSPADM

## 2022-07-10 RX ORDER — TALC
6 POWDER (GRAM) TOPICAL NIGHTLY PRN
Status: DISCONTINUED | OUTPATIENT
Start: 2022-07-10 | End: 2022-07-12 | Stop reason: HOSPADM

## 2022-07-10 RX ORDER — IBUPROFEN 200 MG
16 TABLET ORAL
Status: DISCONTINUED | OUTPATIENT
Start: 2022-07-10 | End: 2022-07-12 | Stop reason: HOSPADM

## 2022-07-10 RX ORDER — INSULIN ASPART 100 [IU]/ML
12 INJECTION, SOLUTION INTRAVENOUS; SUBCUTANEOUS
Status: DISCONTINUED | OUTPATIENT
Start: 2022-07-10 | End: 2022-07-11

## 2022-07-10 RX ORDER — IBUPROFEN 200 MG
24 TABLET ORAL
Status: DISCONTINUED | OUTPATIENT
Start: 2022-07-10 | End: 2022-07-12 | Stop reason: HOSPADM

## 2022-07-10 RX ORDER — ATORVASTATIN CALCIUM 20 MG/1
40 TABLET, FILM COATED ORAL DAILY
Status: DISCONTINUED | OUTPATIENT
Start: 2022-07-10 | End: 2022-07-12 | Stop reason: HOSPADM

## 2022-07-10 RX ORDER — HYDRALAZINE HYDROCHLORIDE 25 MG/1
50 TABLET, FILM COATED ORAL EVERY 8 HOURS
Status: DISCONTINUED | OUTPATIENT
Start: 2022-07-10 | End: 2022-07-10

## 2022-07-10 RX ADMIN — HYDRALAZINE HYDROCHLORIDE 50 MG: 50 TABLET ORAL at 06:07

## 2022-07-10 RX ADMIN — ASPIRIN 81 MG CHEWABLE TABLET 81 MG: 81 TABLET CHEWABLE at 03:07

## 2022-07-10 RX ADMIN — PREDNISONE 5 MG: 5 TABLET ORAL at 03:07

## 2022-07-10 RX ADMIN — POTASSIUM BICARBONATE 50 MEQ: 978 TABLET, EFFERVESCENT ORAL at 12:07

## 2022-07-10 RX ADMIN — INSULIN ASPART 2 UNITS: 100 INJECTION, SOLUTION INTRAVENOUS; SUBCUTANEOUS at 09:07

## 2022-07-10 RX ADMIN — ATORVASTATIN CALCIUM 40 MG: 40 TABLET, FILM COATED ORAL at 03:07

## 2022-07-10 RX ADMIN — MYCOPHENOLATE MOFETIL 1000 MG: 250 CAPSULE ORAL at 09:07

## 2022-07-10 RX ADMIN — CARVEDILOL 25 MG: 25 TABLET, FILM COATED ORAL at 09:07

## 2022-07-10 RX ADMIN — INSULIN ASPART 12 UNITS: 100 INJECTION, SOLUTION INTRAVENOUS; SUBCUTANEOUS at 04:07

## 2022-07-10 RX ADMIN — FLECAINIDE ACETATE 100 MG: 100 TABLET ORAL at 09:07

## 2022-07-10 RX ADMIN — INSULIN DETEMIR 20 UNITS: 100 INJECTION, SOLUTION SUBCUTANEOUS at 09:07

## 2022-07-10 RX ADMIN — APIXABAN 5 MG: 5 TABLET, FILM COATED ORAL at 09:07

## 2022-07-10 RX ADMIN — TACROLIMUS 0.5 MG: 0.5 CAPSULE ORAL at 06:07

## 2022-07-10 RX ADMIN — FAMOTIDINE 20 MG: 20 TABLET ORAL at 09:07

## 2022-07-10 NOTE — ASSESSMENT & PLAN NOTE
Home Lantus 35U qhs (intermitent usage), Novolog (18u, 16u , 16u), SSI, Ozempic  - Detemir 20u qhs  - Aspart 12u tid   - SSI  - Hypoglycemia protocol

## 2022-07-10 NOTE — ASSESSMENT & PLAN NOTE
- Continue home Coreg  - Continue Hydralazine 25mg BID  - Holding HCTZ, Valsartan due elevated creatinine

## 2022-07-10 NOTE — SUBJECTIVE & OBJECTIVE
Past Medical History:   Diagnosis Date    Anxiety 7/29/2014    Arthritis     Bilateral diabetic retinopathy 2017    CKD (chronic kidney disease) stage 2, GFR 60-89 ml/min 12/28/2016    CKD (chronic kidney disease) stage 4, GFR 15-29 ml/min 7/29/2014    Colon polyps 2014    Depression - situational 7/29/2014    Diabetes mellitus     Diabetes type 2 since 2000 7/29/2014    Diabetic neuropathy 7/29/2014    History of cardioversion 10/3/2019    History of hepatitis C, s/p successful Rx w/ SVR24 - 9/2017 7/29/2014    Genotype 1a, treatment naive 10/2014 liver biopsy - grade 1 / stage 1 Completed Harvoni w/ SVR    Hyperlipidemia 7/29/2014    Hypertension     Neuropathy     Organ transplant candidate 7/29/2014    Pancreatitis     S/P cadaveric kidney transplant 11/5/2016. ESRD secondary to HTN/DMII 11/5/2016       Past Surgical History:   Procedure Laterality Date    APPENDECTOMY      COLONOSCOPY N/A 12/21/2020    Procedure: COLONOSCOPY;  Surgeon: Tico Bell MD;  Location: [facility] ENDO ([address] [place] [address]);  Service: Endoscopy;  Laterality: N/A;  ok to hold eliquis per Dr. Valadez, see telephone encounter 11/13/2020-[place]  covid test 12/18 [place]    KIDNEY TRANSPLANT      TRANSESOPHAGEAL ECHOCARDIOGRAPHY N/A 8/26/2019    Procedure: ECHOCARDIOGRAM, TRANSESOPHAGEAL;  [facility];  Location: [facility] EP LAB;  Service: Cardiology;  Laterality: N/A;    TREATMENT OF CARDIAC ARRHYTHMIA N/A 8/26/2019    Procedure: CARDIOVERSION;  Surgeon: Bronson Bowden MD;  Location: [facility] EP LAB;  Service: Cardiology;  Laterality: N/A;  AF, FELICIANO, DCCV, MAC, GP, 3 PREP       Review of patient's allergies indicates:   Allergen Reactions    Nifedipine Other (See Comments)     Gingival hyperplasia       No current facility-administered medications on file prior to encounter.     Current Outpatient Medications on File Prior to Encounter   Medication Sig    apixaban (ELIQUIS) 5 mg Tab Take 1 tablet (5 mg total) by mouth 2 (two) times daily.     aspirin 81 MG Chew Take 81 mg by mouth once daily.    atorvastatin (LIPITOR) 40 MG tablet Take 1 tablet (40 mg total) by mouth once daily.    carvediloL (COREG) 25 MG tablet Take 1 tablet (25 mg total) by mouth 2 (two) times daily.    famotidine (PEPCID) 20 MG tablet Take 1 tablet (20 mg total) by mouth every evening.    flecainide (TAMBOCOR) 100 MG Tab Take 1 tablet (100 mg total) by mouth every 12 (twelve) hours.    gabapentin (NEURONTIN) 300 MG capsule Take 1 capsule by mouth in the morning, 1 capsule midday, and 3 capsules at night.    hydrALAZINE (APRESOLINE) 25 MG tablet Take 1 tablet (25 mg total) by mouth every 12 (twelve) hours.    hydroCHLOROthiazide (HYDRODIURIL) 12.5 MG Tab Take 1 tablet (12.5 mg total) by mouth once daily.    insulin (LANTUS SOLOSTAR U-100 INSULIN) glargine 100 units/mL (3mL) SubQ pen Inject 35 units subcutaneously at night (Patient taking differently: Inject 35 units subcutaneously at night)    insulin lispro (HUMALOG KWIKPEN INSULIN) 100 unit/mL pen Inject 18 units w/ breakfast, 16 units w/ lunch and dinner plus scale 150-200 +2, 201-250 +4, 251-300 +6, 301-350 +8.    meclizine (ANTIVERT) 25 mg tablet Take 1 tablet (25 mg total) by mouth 3 (three) times daily as needed for Dizziness.    melatonin 5 mg Tab Take 1 tablet (5 mg total) by mouth every evening.    predniSONE (DELTASONE) 5 MG tablet Take 1 tablet (5 mg total) by mouth once daily.    tacrolimus (PROGRAF) 0.5 MG Cap Take 1 capsule (0.5 mg total) by mouth every 12 (twelve) hours.    valsartan (DIOVAN) 320 MG tablet Take 1 tablet (320 mg total) by mouth once daily.    blood sugar diagnostic (ONETOUCH ULTRA TEST) Strp USE 1 STRIP TO CHECK BLOOD GLUCOSE FOUR TIMES DAILY    blood-glucose meter kit To check BG 4 times daily, to use with insurance preferred meter-one touch    lancets (ONETOUCH DELICA PLUS LANCET) 33 gauge Misc USE TO CHECK BLOOD GLUCOSE FOUR TIMES DAILY    mycophenolate (CELLCEPT) 250 mg Cap Take 4 capsules (1,000 mg  "total) by mouth 2 (two) times daily.    pen needle, diabetic (BD ULTRA-FINE LO PEN NEEDLE) 32 gauge x 5/32" Ndle USE TO ADMINISTER INSULIN 4 TIMES DAILY.    predniSONE (DELTASONE) 5 MG tablet Take 1 tablet (5 mg total) by mouth once daily.    semaglutide (OZEMPIC) 0.25 mg or 0.5 mg(2 mg/1.5 mL) pen injector Inject 0.5 mg weekly.     Family History       Problem Relation (Age of Onset)    Cancer Brother    Diabetes Mother    Heart disease Mother, Father    Hypertension Mother, Brother          Tobacco Use    Smoking status: Former Smoker     Packs/day: 0.50     Years: 32.00     Pack years: 16.00     Types: Cigarettes     Quit date: 2016     Years since quittin.6    Smokeless tobacco: Never Used    Tobacco comment: Pt reports that he quit in , but started up again in . pt reports he is currently working on quitting again   Substance and Sexual Activity    Alcohol use: Yes     Comment: Pt reports occasional beers on Sundays. Pt reports drinking daily prior to ESRD diagnosis.    Drug use: No    Sexual activity: Yes     Partners: Female     Review of Systems   Constitutional:  Positive for fatigue. Negative for activity change, appetite change, chills, diaphoresis and fever.   HENT:  Negative for rhinorrhea and sore throat.    Eyes:  Negative for visual disturbance.   Respiratory:  Positive for cough and shortness of breath. Negative for chest tightness.    Cardiovascular:  Negative for chest pain and palpitations.   Gastrointestinal:  Negative for abdominal pain, constipation, diarrhea and nausea.   Endocrine: Negative for cold intolerance and polyuria.   Genitourinary:  Negative for decreased urine volume, difficulty urinating, dysuria and frequency.   Musculoskeletal:  Positive for gait problem. Negative for arthralgias and myalgias.   Skin:  Negative for rash and wound.   Neurological:  Positive for weakness. Negative for dizziness, numbness and headaches.   Psychiatric/Behavioral:  Negative for " agitation, behavioral problems and confusion.    Objective:     Vital Signs (Most Recent):  Temp: 98.3 °F (36.8 °C) (07/10/22 1052)  Pulse: (!) 50 (07/10/22 1608)  Resp: 20 (07/10/22 1608)  BP: (!) 197/88 (07/10/22 1608)  SpO2: 96 % (07/10/22 1608)   Vital Signs (24h Range):  Temp:  [98.3 °F (36.8 °C)] 98.3 °F (36.8 °C)  Pulse:  [47-55] 50  Resp:  [19-20] 20  SpO2:  [95 %-99 %] 96 %  BP: (126-197)/(65-88) 197/88     Weight: 90.7 kg (200 lb)  Body mass index is 26.39 kg/m².    Physical Exam  Constitutional:       Appearance: Normal appearance. He is normal weight. He is not ill-appearing.   HENT:      Head: Normocephalic and atraumatic.      Mouth/Throat:      Mouth: Mucous membranes are moist.   Eyes:      Extraocular Movements: Extraocular movements intact.      Conjunctiva/sclera: Conjunctivae normal.   Neck:      Vascular: No JVD.   Cardiovascular:      Rate and Rhythm: Normal rate and regular rhythm.      Heart sounds: No murmur heard.  Pulmonary:      Effort: Pulmonary effort is normal. No respiratory distress.      Breath sounds: Normal breath sounds. No wheezing or rales.   Abdominal:      General: Abdomen is flat. There is no distension.      Palpations: Abdomen is soft.      Tenderness: There is no abdominal tenderness. There is no guarding.   Musculoskeletal:         General: No swelling or tenderness.      Right lower leg: No edema.      Left lower leg: No edema.   Skin:     Findings: No rash.   Neurological:      General: No focal deficit present.      Mental Status: He is alert and oriented to person, place, and time. Mental status is at baseline.   Psychiatric:         Mood and Affect: Mood normal.         Behavior: Behavior normal.           Significant Labs: CBC:   Recent Labs   Lab 07/10/22  1124   WBC 5.77   HGB 11.5*   HCT 38.0*   *     CMP:   Recent Labs   Lab 07/10/22  1124   *   K 3.0*   CL 96   CO2 24   GLU 99   BUN 27*   CREATININE 1.9*   CALCIUM 8.8   PROT 5.9*   ALBUMIN 2.6*    BILITOT 0.6   ALKPHOS 51*   AST 14   ALT 5*   ANIONGAP 14   EGFRNONAA 36.4*     Lactic Acid:   Recent Labs   Lab 07/10/22  1124 07/10/22  1659   LACTATE 3.1* 3.0*     Troponin:   Recent Labs   Lab 07/10/22  1124   TROPONINI 0.265*     Urine Studies:   Recent Labs   Lab 07/10/22  1457   COLORU Yellow   APPEARANCEUA Clear   PHUR 6.0   SPECGRAV 1.015   PROTEINUA 2+*   GLUCUA Negative   KETONESU Negative   BILIRUBINUA Negative   OCCULTUA Negative   NITRITE Negative   LEUKOCYTESUR Negative   RBCUA 1   WBCUA 1   BACTERIA Rare   HYALINECASTS 0       Significant Imaging: I have reviewed all pertinent imaging results/findings within the past 24 hours.

## 2022-07-10 NOTE — ED TRIAGE NOTES
Patient reports to the ED today with reports of COVID-19 concerns.Patient states tested positive 7/8 and states has been experiencing cough, congestion, and SOB. Patient endorses productive cough with brown colored sputum Patient also states that he missed a call from the infusion center to schedule his infusion and states that he does not want to get worse. Hx of kidney transplant    Patient identifiers verified and correct for Ravi Zamorano  LOC: The patient is awake, alert and aware of environment with an appropriate affect, the patient is oriented x 3 and speaking appropriately.   APPEARANCE: Patient appears comfortable and in no acute distress, patient is clean and well groomed.  SKIN: The skin is warm and dry, color consistent with ethnicity, patient has normal skin turgor and moist mucus membranes, skin intact, no breakdown or bruising noted.   MUSCULOSKELETAL: Patient moving all extremities spontaneously, no swelling noted.  RESPIRATORY: Airway is open and patent, respirations are spontaneous, patient has a normal effort and rate, no accessory muscle use noted, pt placed on continuous pulse ox with O2 sats noted at 97% on room air. Cough congestion productive cough   CARDIAC:Denies chest pain   GASTRO: Soft and non tender to palpation, no distention noted, normoactive bowel sounds present in all four quadrants. Pt states bowel movements have been regular.  : Pt denies any pain or frequency with urination.  NEURO: Pt opens eyes spontaneously, behavior appropriate to situation, follows commands, facial expression symmetrical, bilateral hand grasp equal and even, purposeful motor response noted, normal sensation in all extremities when touched with a finger.

## 2022-07-10 NOTE — ASSESSMENT & PLAN NOTE
- Isolation:   - Airborne, Contact and Droplet Precautions  - Cohort patients into COVID units  - N95 mask, wear eye protection  - 20 second hand hygiene              - Limit visitors per hospital policy              - Consolidating lab draws, nursing care, provider visits, and interventions    - Diagnostics: (leukopenia, hyponatremia, hyperferritinemia, elevated troponin, elevated d-dimer, age, and comorbidities are significant predictors of poor clinical outcome)  CBC, CMP, Ferritin, CRP, BNP, Troponin, Portable CXR and UA and culture    - Management:  - Remdesivir for 5 days while hospitalized given Covid Risk score 6 on admission  - Hold dexamethasone at this time as patient not hypoxic   - Supplemental O2 to maintain SpO2 >92%  - Telemetry  - Iintermittent Pulse Ox  - Albuterol treatment PRN

## 2022-07-10 NOTE — ASSESSMENT & PLAN NOTE
Patient with Persistent (7 days or more) atrial fibrillation which is controlled currently with Beta Blocker and Flecanide. Patient is currently in sinus rhythm.[id number] Score: 1. Anticoagulation indicated. Anticoagulation done with Eliquis.  - s/p DCCV in 8/2010, and was started on flecainide 100 mg bid post-procedure  - Continue Coreg, Flecainide, Eliquis  - Telemetry

## 2022-07-10 NOTE — ED PROVIDER NOTES
Encounter Date: 7/10/2022       History     Chief Complaint   Patient presents with    COVID-19 Concerns     Weakness, SOB, pt reports coughing up blood.      66-year-old male with multiple medical comorbidities significant for kidney transplant in 2016 now with CKD stage 2, DM, afib on Eliquis, depression, hyperlipidemia, hypertension presents to the ED with COVID-19 concerns.  Patient was diagnosed with COVID 2 days ago.  He reports feeling some lightheadedness.  Patient also feels that he has slight unsteadiness.  He notices this only at night when getting out of bed.  He also reports a productive cough and has noticed blood streaks in his sputum.  He reports only mild shortness of breath that is worse with exertion.  Denies fever, chills, nausea, vomiting, chest pain or other complaints.         Review of patient's allergies indicates:   Allergen Reactions    Nifedipine Other (See Comments)     Gingival hyperplasia     Past Medical History:   Diagnosis Date    Anxiety 7/29/2014    Arthritis     Bilateral diabetic retinopathy 2017    CKD (chronic kidney disease) stage 2, GFR 60-89 ml/min 12/28/2016    CKD (chronic kidney disease) stage 4, GFR 15-29 ml/min 7/29/2014    Colon polyps 2014    Depression - situational 7/29/2014    Diabetes mellitus     Diabetes type 2 since 2000 7/29/2014    Diabetic neuropathy 7/29/2014    History of cardioversion 10/3/2019    History of hepatitis C, s/p successful Rx w/ SVR24 - 9/2017 7/29/2014    Genotype 1a, treatment naive 10/2014 liver biopsy - grade 1 / stage 1 Completed Harvoni w/ SVR    Hyperlipidemia 7/29/2014    Hypertension     Neuropathy     Organ transplant candidate 7/29/2014    Pancreatitis     S/P cadaveric kidney transplant 11/5/2016. ESRD secondary to HTN/DMII 11/5/2016     Past Surgical History:   Procedure Laterality Date    APPENDECTOMY      COLONOSCOPY N/A 12/21/2020    Procedure: COLONOSCOPY;  Surgeon: Tico Bell MD;  Location: [facility]  ENDO (4TH FLR);  Service: Endoscopy;  Laterality: N/A;  ok to hold holli per Dr. Valadez, see telephone encounter 2020-[place]  covid test  [place]    KIDNEY TRANSPLANT      TRANSESOPHAGEAL ECHOCARDIOGRAPHY N/A 2019    Procedure: ECHOCARDIOGRAM, TRANSESOPHAGEAL;  Surgeon: Dolores Diagnostic Provider;  Location: [facility] EP LAB;  Service: Cardiology;  Laterality: N/A;    TREATMENT OF CARDIAC ARRHYTHMIA N/A 2019    Procedure: CARDIOVERSION;  Surgeon: Bronson Bowden MD;  Location: [facility] EP LAB;  Service: Cardiology;  Laterality: N/A;  AF, FELICIANO, DCCV, MAC, GP, 3 PREP     Family History   Problem Relation Age of Onset    Diabetes Mother     Hypertension Mother     Heart disease Mother         CAD with PCI    Heart disease Father     Cancer Brother         one sister with breast cancer    Hypertension Brother         one sister with HTN and borderline DM    Stroke Neg Hx     Kidney disease Neg Hx      Social History     Tobacco Use    Smoking status: Former Smoker     Packs/day: 0.50     Years: 32.00     Pack years: 16.00     Types: Cigarettes     Quit date: 2016     Years since quittin.6    Smokeless tobacco: Never Used    Tobacco comment: Pt reports that he quit in , but started up again in . pt reports he is currently working on quitting again   Substance Use Topics    Alcohol use: Yes     Comment: Pt reports occasional beers on Sundays. Pt reports drinking daily prior to ESRD diagnosis.    Drug use: No     Review of Systems   Constitutional: Negative for fever.   HENT: Negative for sore throat.    Respiratory: Positive for cough and shortness of breath.    Cardiovascular: Negative for chest pain.   Gastrointestinal: Negative for nausea and vomiting.   Genitourinary: Negative for dysuria.   Musculoskeletal: Negative for back pain.   Skin: Negative for rash.   Neurological: Positive for light-headedness. Negative for weakness.   Hematological: Does not bruise/bleed easily.        Physical Exam     Initial Vitals [07/10/22 1052]   BP Pulse Resp Temp SpO2   126/65 (!) 50 19 98.3 °F (36.8 °C) 97 %      MAP       --         Physical Exam    Nursing note and vitals reviewed.  Constitutional: He appears well-developed and well-nourished.  Non-toxic appearance. He does not appear ill. No distress.   HENT:   Head: Normocephalic and atraumatic.   Neck: Neck supple.   Normal range of motion.  Cardiovascular: Normal rate and regular rhythm. Exam reveals no gallop, no distant heart sounds and no friction rub.    No murmur heard.  Pulmonary/Chest: Effort normal and breath sounds normal. No accessory muscle usage. No tachypnea. No respiratory distress. He has no decreased breath sounds. He has no wheezes. He has no rhonchi. He has no rales.   Abdominal: He exhibits no distension.   Musculoskeletal:      Cervical back: Normal range of motion and neck supple.     Neurological: He is alert.   Skin: No rash noted.         ED Course   Procedures  Labs Reviewed   CBC W/ AUTO DIFFERENTIAL - Abnormal; Notable for the following components:       Result Value    Hemoglobin 11.5 (*)     Hematocrit 38.0 (*)     MCV 81 (*)     MCH 24.4 (*)     MCHC 30.3 (*)     RDW 15.4 (*)     Platelets 100 (*)     Lymph # 0.6 (*)     Mono # 1.1 (*)     Lymph % 9.7 (*)     Mono % 19.1 (*)     Platelet Estimate Decreased (*)     All other components within normal limits   COMPREHENSIVE METABOLIC PANEL - Abnormal; Notable for the following components:    Sodium 134 (*)     Potassium 3.0 (*)     BUN 27 (*)     Creatinine 1.9 (*)     Total Protein 5.9 (*)     Albumin 2.6 (*)     Alkaline Phosphatase 51 (*)     ALT 5 (*)     eGFR if  42.1 (*)     eGFR if non  36.4 (*)     All other components within normal limits   C-REACTIVE PROTEIN - Abnormal; Notable for the following components:    CRP 40.5 (*)     All other components within normal limits   LACTIC ACID, PLASMA - Abnormal; Notable for the  following components:    Lactate (Lactic Acid) 3.1 (*)     All other components within normal limits   TROPONIN I - Abnormal; Notable for the following components:    Troponin I 0.265 (*)     All other components within normal limits   B-TYPE NATRIURETIC PEPTIDE - Abnormal; Notable for the following components:    BNP 3,087 (*)     All other components within normal limits   URINALYSIS - Abnormal; Notable for the following components:    Protein, UA 2+ (*)     All other components within normal limits    Narrative:     Specimen Source->Urine   IRON AND TIBC - Abnormal; Notable for the following components:    Iron 14 (*)     Transferrin 150 (*)     TIBC 222 (*)     Saturated Iron 6 (*)     All other components within normal limits    Narrative:     add on iron profile per Nicola Arzate MD order# [id number]    07/10/2022 @ 14:27    PROTEIN / CREATININE RATIO, URINE - Abnormal; Notable for the following components:    Protein, Urine Random 291 (*)     Prot/Creat Ratio, Urine 2.53 (*)     All other components within normal limits    Narrative:     Specimen Source->Urine   FERRITIN   LACTATE DEHYDROGENASE   CK   IRON AND TIBC   SODIUM, URINE, RANDOM    Narrative:     Specimen Source->Urine   CREATININE, URINE, RANDOM    Narrative:     Specimen Source->Urine   UREA NITROGEN, URINE, RANDOM    Narrative:     Specimen Source->Urine   URINALYSIS MICROSCOPIC    Narrative:     Specimen Source->Urine   TACROLIMUS LEVEL   LACTIC ACID, PLASMA   D DIMER, QUANTITATIVE   POCT GLUCOSE MONITORING CONTINUOUS          Imaging Results          X-Ray Chest AP Portable (Final result)  Result time 07/10/22 12:07:32    Final result by Alex Morton MD (07/10/22 12:07:32)                 Impression:      Left basilar subsegmental atelectasis versus trace left pleural effusion.  Otherwise, no detrimental change when compared with 11/05/2016.      Electronically signed by: Alex Morton MD  Date:    07/10/2022  Time:    12:07           "   Narrative:    EXAMINATION:  XR CHEST AP PORTABLE    CLINICAL HISTORY:  Provided history is "COVID-19;  ".    TECHNIQUE:  One view of the chest.    COMPARISON:  11/05/2016.    FINDINGS:  Cardiac wires overlie the chest.  Cardiomediastinal silhouette is magnified by portable technique and is likely at the upper limits of normal in size.  Left basilar subsegmental atelectasis versus trace left pleural fluid.  No confluent area of consolidation.  No large pleural effusion.  No distinct pneumothorax.                                 Medications   sodium chloride 0.9% flush 10 mL (has no administration in time range)   remdesivir 200 mg in sodium chloride 0.9% 250 mL infusion (0 mg Intravenous Stopped 7/10/22 1700)     Followed by   remdesivir 100 mg in sodium chloride 0.9% 250 mL infusion (has no administration in time range)   apixaban tablet 5 mg (has no administration in time range)   aspirin chewable tablet 81 mg (81 mg Oral Given 7/10/22 1530)   atorvastatin tablet 40 mg (40 mg Oral Given 7/10/22 1530)   carvediloL tablet 25 mg (has no administration in time range)   famotidine tablet 20 mg (has no administration in time range)   flecainide tablet 100 mg (has no administration in time range)   hydrALAZINE tablet 25 mg (has no administration in time range)   insulin aspart U-100 pen 12 Units (has no administration in time range)   insulin detemir U-100 pen 20 Units (has no administration in time range)   meclizine tablet 25 mg (has no administration in time range)   melatonin tablet 6 mg (has no administration in time range)   mycophenolate capsule 1,000 mg (has no administration in time range)   predniSONE tablet 5 mg (5 mg Oral Given 7/10/22 1530)   tacrolimus capsule 0.5 mg (has no administration in time range)   glucose chewable tablet 16 g (has no administration in time range)   glucose chewable tablet 24 g (has no administration in time range)   glucagon (human recombinant) injection 1 mg (has no " administration in time range)   dextrose 10% bolus 125 mL (has no administration in time range)   dextrose 10% bolus 250 mL (has no administration in time range)   insulin aspart U-100 pen 0-5 Units (has no administration in time range)   albuterol inhaler 2 puff (has no administration in time range)   potassium bicarbonate disintegrating tablet 50 mEq (50 mEq Oral Given 7/10/22 1243)   sodium chloride 0.9% bolus 1,000 mL (0 mLs Intravenous Stopped 7/10/22 1412)     Medical Decision Making:   History:   Old Medical Records: I decided to obtain old medical records.  Initial Assessment:   66-year-old male with a history of kidney transplant presents to the ED for evaluation of lightheadedness and shortness of breath with recent diagnosis of COVID infection.  Patient appears in no acute distress.  Afebrile.  Hemodynamically stable.  Differential Diagnosis:   My differential diagnosis includes but is not limited to:  COVID, pneumonia, URI, viral syndrome, heart failure    Clinical Tests:   Lab Tests: Ordered  Radiological Study: Ordered  Medical Tests: Ordered  Other:   I have discussed this case with another health care provider.       <> Summary of the Discussion: Hospital Medicine             ED Course as of 07/10/22 1620   Sun Jul 10, 2022   1228 BNP(!): 3,087 [EH]   1228 Troponin I(!): 0.265 [EH]   1229 Lactate, Anthony(!): 3.1 [EH]   1617 Patient with evidence of cardiac demand ischemia likely from his COVID infection.  Patient also with a lactic acid of 3.1.  Labs also showed a slight PHUONG with creatinine of 1.9 from a baseline around 1.4-1.5.  Patient given 1 L of IV fluids.  Chest x-ray did not show any evidence of pneumonia or pulmonary edema.  Small pleural effusion noted.  Ambulatory sats were 94% on room air.  Patient will be admitted to Hospital Medicine for further management.  Patient updated on test results and plan.  He is agreeable to admission. [EH]   1619 I have reviewed the patient's records and  discussed this case with my supervising physician.      [EH]      ED Course User Index  [EH] Lashawn Nayak PA-C             Clinical Impression:   Final diagnoses:  [R06.02] Shortness of breath  [I50.9] Acute on chronic heart failure, unspecified heart failure type (Primary)  [U07.1] COVID-19 virus infection  [Z94.0] Kidney transplanted          ED Disposition Condition    Admit               Lashawn Nayak PA-C  07/10/22 4240

## 2022-07-10 NOTE — HPI
Mr. Zamorano is a 65 yo M with a h/o CKD status post  donor kidney transplant 2016, afib on AC, diabetes, and hypertension who presents with lightheadedness, productive cough with blood-streaked, DOSS, and weakness in the setting of positive COVID two days prior to admission.  Patient states symptoms started 4 days prior to admission we underwent COVID testing the following day. Symptoms continue to progress with worsening shortness of breath, productive cough, and unsteady gait resulting in patient presented to ED for further evaluation.  Patient denies myalgias, sore throat, nausea, abdominal pain, chest pain, headache, orthopnea, PND, bilateral lower extremity swelling, decrease oral intake, decrease urine output, dysuria, change in urine color, recent travel, and nonadherence to medications.    In the Ed, patient HDS. Labs notable for creatinine 1.9, potassium 3, BNP 3087, troponin 0.265, lactate 3.1.  Chest x-ray notable for possible trace left pleural effusion.  Patient given 1 L normal saline in the emergency room and potassium replacement.

## 2022-07-10 NOTE — H&P
[facility] Dept  [facility] Medicine  History & Physical    Patient Name: Ravi Zamorano  MRN: [id number]  Patient Class: IP- Inpatient  Admission Date: 7/10/2022  Attending Physician: Nicola Arzate MD   Primary Care Provider: Mima Mack MD         Patient information was obtained from patient, past medical records and ER records.     Subjective:     Principal Problem:COVID-19 virus infection    Chief Complaint:   Chief Complaint   Patient presents with    COVID-19 Concerns     Weakness, SOB, pt reports coughing up blood.         HPI: Mr. Zamorano is a 67 yo M with a h/o CKD status post  donor kidney transplant 2016, afib on AC, diabetes, and hypertension who presents with lightheadedness, productive cough with blood-streaked, DOSS, and weakness in the setting of positive COVID two days prior to admission.  Patient states symptoms started 4 days prior to admission we underwent COVID testing the following day. Symptoms continue to progress with worsening shortness of breath, productive cough, and unsteady gait resulting in patient presented to ED for further evaluation.  Patient denies myalgias, sore throat, nausea, abdominal pain, chest pain, headache, orthopnea, PND, bilateral lower extremity swelling, decrease oral intake, decrease urine output, dysuria, change in urine color, recent travel, and nonadherence to medications.    In the Ed, patient HDS. Labs notable for creatinine 1.9, potassium 3, BNP 3087, troponin 0.265, lactate 3.1.  Chest x-ray notable for possible trace left pleural effusion.  Patient given 1 L normal saline in the emergency room and potassium replacement.      Past Medical History:   Diagnosis Date    Anxiety 2014    Arthritis     Bilateral diabetic retinopathy     CKD (chronic kidney disease) stage 2, GFR 60-89 ml/min 2016    CKD (chronic kidney disease) stage 4, GFR 15-29 ml/min 2014    Colon polyps     Depression -  situational 7/29/2014    Diabetes mellitus     Diabetes type 2 since 2000 7/29/2014    Diabetic neuropathy 7/29/2014    History of cardioversion 10/3/2019    History of hepatitis C, s/p successful Rx w/ SVR24 - 9/2017 7/29/2014    Genotype 1a, treatment naive 10/2014 liver biopsy - grade 1 / stage 1 Completed Harvoni w/ SVR    Hyperlipidemia 7/29/2014    Hypertension     Neuropathy     Organ transplant candidate 7/29/2014    Pancreatitis     S/P cadaveric kidney transplant 11/5/2016. ESRD secondary to HTN/DMII 11/5/2016       Past Surgical History:   Procedure Laterality Date    APPENDECTOMY      COLONOSCOPY N/A 12/21/2020    Procedure: COLONOSCOPY;  Surgeon: Tico Bell MD;  Location: [facility] END[facility] ([address] [place] [address]);  Service: Endoscopy;  Laterality: N/A;  ok to hold eliquis per Dr. Valadez, see telephone encounter 11/13/2020-[place]  covid test 12/18 [place]    KIDNEY TRANSPLANT      TRANSESOPHAGEAL ECHOCARDIOGRAPHY N/A 8/26/2019    Procedure: ECHOCARDIOGRAM, TRANSESOPHAGEAL;  Surgeon: Dolores Diagnostic Provider;  Location: [facility] EP LAB;  Service: Cardiology;  Laterality: N/A;    TREATMENT OF CARDIAC ARRHYTHMIA N/A 8/26/2019    Procedure: CARDIOVERSION;  Surgeon: Bronson Bowden MD;  Location: [facility] EP LAB;  Service: Cardiology;  Laterality: N/A;  AF, FELICIANO, DCCV, MAC, GP, 3 PREP       Review of patient's allergies indicates:   Allergen Reactions    Nifedipine Other (See Comments)     Gingival hyperplasia       No current facility-administered medications on file prior to encounter.     Current Outpatient Medications on File Prior to Encounter   Medication Sig    apixaban (ELIQUIS) 5 mg Tab Take 1 tablet (5 mg total) by mouth 2 (two) times daily.    aspirin 81 MG Chew Take 81 mg by mouth once daily.    atorvastatin (LIPITOR) 40 MG tablet Take 1 tablet (40 mg total) by mouth once daily.    carvediloL (COREG) 25 MG tablet Take 1 tablet (25 mg total) by mouth 2 (two) times daily.    famotidine (PEPCID) 20 MG tablet  "Take 1 tablet (20 mg total) by mouth every evening.    flecainide (TAMBOCOR) 100 MG Tab Take 1 tablet (100 mg total) by mouth every 12 (twelve) hours.    gabapentin (NEURONTIN) 300 MG capsule Take 1 capsule by mouth in the morning, 1 capsule midday, and 3 capsules at night.    hydrALAZINE (APRESOLINE) 25 MG tablet Take 1 tablet (25 mg total) by mouth every 12 (twelve) hours.    hydroCHLOROthiazide (HYDRODIURIL) 12.5 MG Tab Take 1 tablet (12.5 mg total) by mouth once daily.    insulin (LANTUS SOLOSTAR U-100 INSULIN) glargine 100 units/mL (3mL) SubQ pen Inject 35 units subcutaneously at night (Patient taking differently: Inject 35 units subcutaneously at night)    insulin lispro (HUMALOG KWIKPEN INSULIN) 100 unit/mL pen Inject 18 units w/ breakfast, 16 units w/ lunch and dinner plus scale 150-200 +2, 201-250 +4, 251-300 +6, 301-350 +8.    meclizine (ANTIVERT) 25 mg tablet Take 1 tablet (25 mg total) by mouth 3 (three) times daily as needed for Dizziness.    melatonin 5 mg Tab Take 1 tablet (5 mg total) by mouth every evening.    predniSONE (DELTASONE) 5 MG tablet Take 1 tablet (5 mg total) by mouth once daily.    tacrolimus (PROGRAF) 0.5 MG Cap Take 1 capsule (0.5 mg total) by mouth every 12 (twelve) hours.    valsartan (DIOVAN) 320 MG tablet Take 1 tablet (320 mg total) by mouth once daily.    blood sugar diagnostic (ONETOUCH ULTRA TEST) Strp USE 1 STRIP TO CHECK BLOOD GLUCOSE FOUR TIMES DAILY    blood-glucose meter kit To check BG 4 times daily, to use with insurance preferred meter-one touch    lancets (ONETOUCH DELICA PLUS LANCET) 33 gauge Misc USE TO CHECK BLOOD GLUCOSE FOUR TIMES DAILY    mycophenolate (CELLCEPT) 250 mg Cap Take 4 capsules (1,000 mg total) by mouth 2 (two) times daily.    pen needle, diabetic (BD ULTRA-FINE LO PEN NEEDLE) 32 gauge x 5/32" Ndle USE TO ADMINISTER INSULIN 4 TIMES DAILY.    predniSONE (DELTASONE) 5 MG tablet Take 1 tablet (5 mg total) by mouth once daily.    " semaglutide (OZEMPIC) 0.25 mg or 0.5 mg(2 mg/1.5 mL) pen injector Inject 0.5 mg weekly.     Family History       Problem Relation (Age of Onset)    Cancer Brother    Diabetes Mother    Heart disease Mother, Father    Hypertension Mother, Brother          Tobacco Use    Smoking status: Former Smoker     Packs/day: 0.50     Years: 32.00     Pack years: 16.00     Types: Cigarettes     Quit date: 2016     Years since quittin.6    Smokeless tobacco: Never Used    Tobacco comment: Pt reports that he quit in , but started up again in . pt reports he is currently working on quitting again   Substance and Sexual Activity    Alcohol use: Yes     Comment: Pt reports occasional beers on Sundays. Pt reports drinking daily prior to ESRD diagnosis.    Drug use: No    Sexual activity: Yes     Partners: Female     Review of Systems   Constitutional:  Positive for fatigue. Negative for activity change, appetite change, chills, diaphoresis and fever.   HENT:  Negative for rhinorrhea and sore throat.    Eyes:  Negative for visual disturbance.   Respiratory:  Positive for cough and shortness of breath. Negative for chest tightness.    Cardiovascular:  Negative for chest pain and palpitations.   Gastrointestinal:  Negative for abdominal pain, constipation, diarrhea and nausea.   Endocrine: Negative for cold intolerance and polyuria.   Genitourinary:  Negative for decreased urine volume, difficulty urinating, dysuria and frequency.   Musculoskeletal:  Positive for gait problem. Negative for arthralgias and myalgias.   Skin:  Negative for rash and wound.   Neurological:  Positive for weakness. Negative for dizziness, numbness and headaches.   Psychiatric/Behavioral:  Negative for agitation, behavioral problems and confusion.    Objective:     Vital Signs (Most Recent):  Temp: 98.3 °F (36.8 °C) (07/10/22 1052)  Pulse: (!) 50 (07/10/22 1608)  Resp: 20 (07/10/22 1608)  BP: (!) 197/88 (07/10/22 1608)  SpO2: 96 %  (07/10/22 1608)   Vital Signs (24h Range):  Temp:  [98.3 °F (36.8 °C)] 98.3 °F (36.8 °C)  Pulse:  [47-55] 50  Resp:  [19-20] 20  SpO2:  [95 %-99 %] 96 %  BP: (126-197)/(65-88) 197/88     Weight: 90.7 kg (200 lb)  Body mass index is 26.39 kg/m².    Physical Exam  Constitutional:       Appearance: Normal appearance. He is normal weight. He is not ill-appearing.   HENT:      Head: Normocephalic and atraumatic.      Mouth/Throat:      Mouth: Mucous membranes are moist.   Eyes:      Extraocular Movements: Extraocular movements intact.      Conjunctiva/sclera: Conjunctivae normal.   Neck:      Vascular: No JVD.   Cardiovascular:      Rate and Rhythm: Normal rate and regular rhythm.      Heart sounds: No murmur heard.  Pulmonary:      Effort: Pulmonary effort is normal. No respiratory distress.      Breath sounds: Normal breath sounds. No wheezing or rales.   Abdominal:      General: Abdomen is flat. There is no distension.      Palpations: Abdomen is soft.      Tenderness: There is no abdominal tenderness. There is no guarding.   Musculoskeletal:         General: No swelling or tenderness.      Right lower leg: No edema.      Left lower leg: No edema.   Skin:     Findings: No rash.   Neurological:      General: No focal deficit present.      Mental Status: He is alert and oriented to person, place, and time. Mental status is at baseline.   Psychiatric:         Mood and Affect: Mood normal.         Behavior: Behavior normal.           Significant Labs: CBC:   Recent Labs   Lab 07/10/22  1124   WBC 5.77   HGB 11.5*   HCT 38.0*   *     CMP:   Recent Labs   Lab 07/10/22  1124   *   K 3.0*   CL 96   CO2 24   GLU 99   BUN 27*   CREATININE 1.9*   CALCIUM 8.8   PROT 5.9*   ALBUMIN 2.6*   BILITOT 0.6   ALKPHOS 51*   AST 14   ALT 5*   ANIONGAP 14   EGFRNONAA 36.4*     Lactic Acid:   Recent Labs   Lab 07/10/22  1124 07/10/22  1659   LACTATE 3.1* 3.0*     Troponin:   Recent Labs   Lab 07/10/22  1124   TROPONINI 0.265*      Urine Studies:   Recent Labs   Lab 07/10/22  1457   COLORU Yellow   APPEARANCEUA Clear   PHUR 6.0   SPECGRAV 1.015   PROTEINUA 2+*   GLUCUA Negative   KETONESU Negative   BILIRUBINUA Negative   OCCULTUA Negative   NITRITE Negative   LEUKOCYTESUR Negative   RBCUA 1   WBCUA 1   BACTERIA Rare   HYALINECASTS 0       Significant Imaging: I have reviewed all pertinent imaging results/findings within the past 24 hours.    Assessment/Plan:     * COVID-19 virus infection  - Isolation:   - Airborne, Contact and Droplet Precautions  - Cohort patients into COVID units  - N95 mask, wear eye protection  - 20 second hand hygiene              - Limit visitors per hospital policy              - Consolidating lab draws, nursing care, provider visits, and interventions    - Diagnostics: (leukopenia, hyponatremia, hyperferritinemia, elevated troponin, elevated d-dimer, age, and comorbidities are significant predictors of poor clinical outcome)  CBC, CMP, Ferritin, CRP, BNP, Troponin, Portable CXR and UA and culture    - Management:  - Remdesivir for 5 days while hospitalized given Covid Risk score 6 on admission  - Hold dexamethasone at this time as patient not hypoxic   - Supplemental O2 to maintain SpO2 >92%  - Telemetry  - Iintermittent Pulse Ox  - Albuterol treatment PRN    CKD (chronic kidney disease) stage 2, GFR 60-89 ml/min  S/p transplant in 2016 on immunosuppression. Baseline creatinine 1.5/1.6. Report good PO intake and medication adherence.  - Admission creatinine 1.9   - Urine lytes  - KTM consult on 7/11  - Tacro level daily  - Continue tacrolimus, cell cept, prednisone   - Avoid nephrotoxin medications, diuretics       Type 2 diabetes mellitus with stage 2 chronic kidney disease, with long-term current use of insulin  Home Lantus 35U qhs (intermitent usage), Novolog (18u, 16u , 16u), SSI, Ozempic  - Detemir 20u qhs  - Aspart 12u tid   - SSI  - Hypoglycemia protocol      Elevated troponin I level  - No chest pain on  admission, EKG with ST changes  - Admit troponin 0.265  - Ddx: demand from CHF vs COVID  - Trend troponin      High serum lactate  - Admit lactate 3.1  - Ddx: infection vs CHF  - s/p 1L IVF  - Trend lactate      Chronic diastolic congestive heart failure  - Prior TTE 6/202: EF 55%, G III DD, mild MR, PASP 42, CVP 3  - Admit BNP 3087 (prior 892 in 2014), elevation may be 2/2 COVID infection  - TTE pending  - Holding diuresis  - Continue home coreg  - CHF Pathway    Hypokalemia  - Admit K 3  - Replace as needed  - Trend BMP      Persistent atrial fibrillation  Patient with Persistent (7 days or more) atrial fibrillation which is controlled currently with Beta Blocker and Flecanide. Patient is currently in sinus rhythm.[id number] Score: 1. Anticoagulation indicated. Anticoagulation done with Eliquis.  - s/p DCCV in 8/2010, and was started on flecainide 100 mg bid post-procedure  - Continue Coreg, Flecainide, Eliquis  - Telemetry        Hypertension since 2000  - Continue home Coreg  - Continue Hydralazine 25mg BID  - Holding HCTZ, Valsartan due elevated creatinine      Long-term use of immunosuppressant medication  - See above      S/P cadaveric kidney transplant 11/5/2016. ESRD secondary to HTN/DMII  - See above      Hyperlipidemia  - Continue home atorvastatin       Diabetic polyneuropathy associated with type 2 diabetes mellitus  - Hold home gabapentin due to elevated creatinine        VTE Risk Mitigation (From admission, onward)         Ordered     apixaban tablet 5 mg  2 times daily         07/10/22 1437     IP VTE HIGH RISK PATIENT  Once         07/10/22 1425     Place sequential compression device  Until discontinued         07/10/22 1425                   Nicola Arzate MD  Department of Hospital Medicine   [facility] - Emergency Dept

## 2022-07-10 NOTE — ASSESSMENT & PLAN NOTE
- No chest pain on admission, EKG with ST changes  - Admit troponin 0.265  - Ddx: demand from CHF vs COVID  - Trend troponin

## 2022-07-10 NOTE — ASSESSMENT & PLAN NOTE
- Prior TTE 6/202: EF 55%, G III DD, mild MR, PASP 42, CVP 3  - Admit BNP 3087 (prior 892 in 2014), elevation may be 2/2 COVID infection  - TTE pending  - Holding diuresis  - Continue home coreg  - CHF Pathway

## 2022-07-10 NOTE — ASSESSMENT & PLAN NOTE
S/p transplant in 2016 on immunosuppression. Baseline creatinine 1.5/1.6. Report good PO intake and medication adherence.  - Admission creatinine 1.9   - Urine lytes  - KTM consult on 7/11  - Tacro level daily  - Continue tacrolimus, cell cept, prednisone   - Avoid nephrotoxin medications, diuretics

## 2022-07-11 ENCOUNTER — TELEPHONE (OUTPATIENT)
Dept: INFECTIOUS DISEASES | Facility: HOSPITAL | Age: 67
End: 2022-07-11
Payer: COMMERCIAL

## 2022-07-11 LAB
ALBUMIN SERPL BCP-MCNC: 2.4 G/DL (ref 3.5–5.2)
ALP SERPL-CCNC: 49 U/L (ref 55–135)
ALT SERPL W/O P-5'-P-CCNC: <5 U/L (ref 10–44)
ANION GAP SERPL CALC-SCNC: 12 MMOL/L (ref 8–16)
ASCENDING AORTA: 3.6 CM
AST SERPL-CCNC: 14 U/L (ref 10–40)
AV INDEX (PROSTH): 0.81
AV MEAN GRADIENT: 4 MMHG
AV PEAK GRADIENT: 7 MMHG
AV VALVE AREA: 2.54 CM2
AV VELOCITY RATIO: 0.85
BASOPHILS # BLD AUTO: 0.02 K/UL (ref 0–0.2)
BASOPHILS NFR BLD: 0.6 % (ref 0–1.9)
BILIRUB SERPL-MCNC: 0.4 MG/DL (ref 0.1–1)
BSA FOR ECHO PROCEDURE: 2.16 M2
BUN SERPL-MCNC: 30 MG/DL (ref 8–23)
CALCIUM SERPL-MCNC: 8.6 MG/DL (ref 8.7–10.5)
CHLORIDE SERPL-SCNC: 98 MMOL/L (ref 95–110)
CO2 SERPL-SCNC: 24 MMOL/L (ref 23–29)
CREAT SERPL-MCNC: 1.6 MG/DL (ref 0.5–1.4)
CV ECHO LV RWT: 0.41 CM
DIFFERENTIAL METHOD: ABNORMAL
DOP CALC AO PEAK VEL: 1.29 M/S
DOP CALC AO VTI: 28.77 CM
DOP CALC LVOT AREA: 3.1 CM2
DOP CALC LVOT DIAMETER: 2 CM
DOP CALC LVOT PEAK VEL: 1.1 M/S
DOP CALC LVOT STROKE VOLUME: 73.13 CM3
DOP CALCLVOT PEAK VEL VTI: 23.29 CM
ECHO LV POSTERIOR WALL: 1.2 CM (ref 0.6–1.1)
EJECTION FRACTION: 65 %
EOSINOPHIL # BLD AUTO: 0 K/UL (ref 0–0.5)
EOSINOPHIL NFR BLD: 0 % (ref 0–8)
ERYTHROCYTE [DISTWIDTH] IN BLOOD BY AUTOMATED COUNT: 15.3 % (ref 11.5–14.5)
EST. GFR  (AFRICAN AMERICAN): 51.9 ML/MIN/1.73 M^2
EST. GFR  (NON AFRICAN AMERICAN): 44.9 ML/MIN/1.73 M^2
ESTIMATED AVG GLUCOSE: 177 MG/DL (ref 68–131)
FRACTIONAL SHORTENING: 38 % (ref 28–44)
GLUCOSE SERPL-MCNC: 112 MG/DL (ref 70–110)
GLUCOSE SERPL-MCNC: 165 MG/DL (ref 70–110)
HBA1C MFR BLD: 7.8 % (ref 4–5.6)
HCT VFR BLD AUTO: 38.1 % (ref 40–54)
HGB BLD-MCNC: 11.6 G/DL (ref 14–18)
IMM GRANULOCYTES # BLD AUTO: 0.01 K/UL (ref 0–0.04)
IMM GRANULOCYTES NFR BLD AUTO: 0.3 % (ref 0–0.5)
INTERVENTRICULAR SEPTUM: 1.2 CM (ref 0.6–1.1)
LA MAJOR: 8.33 CM
LA MINOR: 5.94 CM
LA WIDTH: 4.64 CM
LACTATE SERPL-SCNC: 1 MMOL/L (ref 0.5–2.2)
LEFT ATRIUM SIZE: 4.01 CM
LEFT ATRIUM VOLUME INDEX: 51 ML/M2
LEFT ATRIUM VOLUME: 109.68 CM3
LEFT INTERNAL DIMENSION IN SYSTOLE: 3.62 CM (ref 2.1–4)
LEFT VENTRICLE DIASTOLIC VOLUME INDEX: 60.42 ML/M2
LEFT VENTRICLE DIASTOLIC VOLUME: 129.91 ML
LEFT VENTRICLE MASS INDEX: 138 G/M2
LEFT VENTRICLE SYSTOLIC VOLUME INDEX: 25.7 ML/M2
LEFT VENTRICLE SYSTOLIC VOLUME: 55.25 ML
LEFT VENTRICULAR INTERNAL DIMENSION IN DIASTOLE: 5.8 CM (ref 3.5–6)
LEFT VENTRICULAR MASS: 297 G
LYMPHOCYTES # BLD AUTO: 0.7 K/UL (ref 1–4.8)
LYMPHOCYTES NFR BLD: 18.3 % (ref 18–48)
MAGNESIUM SERPL-MCNC: 1.7 MG/DL (ref 1.6–2.6)
MCH RBC QN AUTO: 24.2 PG (ref 27–31)
MCHC RBC AUTO-ENTMCNC: 30.4 G/DL (ref 32–36)
MCV RBC AUTO: 80 FL (ref 82–98)
MONOCYTES # BLD AUTO: 0.9 K/UL (ref 0.3–1)
MONOCYTES NFR BLD: 25.8 % (ref 4–15)
NEUTROPHILS # BLD AUTO: 2 K/UL (ref 1.8–7.7)
NEUTROPHILS NFR BLD: 55 % (ref 38–73)
NRBC BLD-RTO: 0 /100 WBC
PHOSPHATE SERPL-MCNC: 2.9 MG/DL (ref 2.7–4.5)
PISA TR MAX VEL: 3.13 M/S
PLATELET # BLD AUTO: 94 K/UL (ref 150–450)
PMV BLD AUTO: ABNORMAL FL (ref 9.2–12.9)
POCT GLUCOSE: 103 MG/DL (ref 70–110)
POCT GLUCOSE: 112 MG/DL (ref 70–110)
POCT GLUCOSE: 124 MG/DL (ref 70–110)
POCT GLUCOSE: 197 MG/DL (ref 70–110)
POCT GLUCOSE: 296 MG/DL (ref 70–110)
POTASSIUM SERPL-SCNC: 3.3 MMOL/L (ref 3.5–5.1)
PROT SERPL-MCNC: 5.7 G/DL (ref 6–8.4)
RA MAJOR: 5.54 CM
RA WIDTH: 4.46 CM
RBC # BLD AUTO: 4.79 M/UL (ref 4.6–6.2)
RIGHT VENTRICULAR END-DIASTOLIC DIMENSION: 4.13 CM
SINUS: 3.7 CM
SODIUM SERPL-SCNC: 134 MMOL/L (ref 136–145)
STJ: 3.1 CM
TACROLIMUS BLD-MCNC: 7.6 NG/ML (ref 5–15)
TACROLIMUS BLD-MCNC: 8.1 NG/ML (ref 5–15)
TDI LATERAL: 0.08 M/S
TDI SEPTAL: 0.06 M/S
TDI: 0.07 M/S
TR MAX PG: 39 MMHG
TRICUSPID ANNULAR PLANE SYSTOLIC EXCURSION: 2.34 CM
TROPONIN I SERPL DL<=0.01 NG/ML-MCNC: 0.09 NG/ML (ref 0–0.03)
WBC # BLD AUTO: 3.56 K/UL (ref 3.9–12.7)

## 2022-07-11 PROCEDURE — 12000002 HC ACUTE/MED SURGE SEMI-PRIVATE ROOM

## 2022-07-11 PROCEDURE — 80053 COMPREHEN METABOLIC PANEL: CPT | Performed by: STUDENT IN AN ORGANIZED HEALTH CARE EDUCATION/TRAINING PROGRAM

## 2022-07-11 PROCEDURE — 83605 ASSAY OF LACTIC ACID: CPT | Performed by: STUDENT IN AN ORGANIZED HEALTH CARE EDUCATION/TRAINING PROGRAM

## 2022-07-11 PROCEDURE — 83036 HEMOGLOBIN GLYCOSYLATED A1C: CPT | Performed by: STUDENT IN AN ORGANIZED HEALTH CARE EDUCATION/TRAINING PROGRAM

## 2022-07-11 PROCEDURE — 25000003 PHARM REV CODE 250

## 2022-07-11 PROCEDURE — 99232 SBSQ HOSP IP/OBS MODERATE 35: CPT | Mod: CR,,, | Performed by: STUDENT IN AN ORGANIZED HEALTH CARE EDUCATION/TRAINING PROGRAM

## 2022-07-11 PROCEDURE — 27000207 HC ISOLATION

## 2022-07-11 PROCEDURE — 99223 PR INITIAL HOSPITAL CARE,LEVL III: ICD-10-PCS | Mod: ,,, | Performed by: INTERNAL MEDICINE

## 2022-07-11 PROCEDURE — 63600175 PHARM REV CODE 636 W HCPCS: Mod: TB | Performed by: STUDENT IN AN ORGANIZED HEALTH CARE EDUCATION/TRAINING PROGRAM

## 2022-07-11 PROCEDURE — 84484 ASSAY OF TROPONIN QUANT: CPT | Performed by: STUDENT IN AN ORGANIZED HEALTH CARE EDUCATION/TRAINING PROGRAM

## 2022-07-11 PROCEDURE — 83735 ASSAY OF MAGNESIUM: CPT | Performed by: STUDENT IN AN ORGANIZED HEALTH CARE EDUCATION/TRAINING PROGRAM

## 2022-07-11 PROCEDURE — C9399 UNCLASSIFIED DRUGS OR BIOLOG: HCPCS | Performed by: STUDENT IN AN ORGANIZED HEALTH CARE EDUCATION/TRAINING PROGRAM

## 2022-07-11 PROCEDURE — 94761 N-INVAS EAR/PLS OXIMETRY MLT: CPT

## 2022-07-11 PROCEDURE — 85025 COMPLETE CBC W/AUTO DIFF WBC: CPT | Performed by: STUDENT IN AN ORGANIZED HEALTH CARE EDUCATION/TRAINING PROGRAM

## 2022-07-11 PROCEDURE — 99232 PR SUBSEQUENT HOSPITAL CARE,LEVL II: ICD-10-PCS | Mod: CR,,, | Performed by: STUDENT IN AN ORGANIZED HEALTH CARE EDUCATION/TRAINING PROGRAM

## 2022-07-11 PROCEDURE — 99223 1ST HOSP IP/OBS HIGH 75: CPT | Mod: ,,, | Performed by: INTERNAL MEDICINE

## 2022-07-11 PROCEDURE — 36415 COLL VENOUS BLD VENIPUNCTURE: CPT | Performed by: STUDENT IN AN ORGANIZED HEALTH CARE EDUCATION/TRAINING PROGRAM

## 2022-07-11 PROCEDURE — 80197 ASSAY OF TACROLIMUS: CPT | Performed by: STUDENT IN AN ORGANIZED HEALTH CARE EDUCATION/TRAINING PROGRAM

## 2022-07-11 PROCEDURE — 84100 ASSAY OF PHOSPHORUS: CPT | Performed by: STUDENT IN AN ORGANIZED HEALTH CARE EDUCATION/TRAINING PROGRAM

## 2022-07-11 PROCEDURE — 25000003 PHARM REV CODE 250: Performed by: STUDENT IN AN ORGANIZED HEALTH CARE EDUCATION/TRAINING PROGRAM

## 2022-07-11 RX ORDER — POTASSIUM CHLORIDE 20 MEQ/1
40 TABLET, EXTENDED RELEASE ORAL ONCE
Status: COMPLETED | OUTPATIENT
Start: 2022-07-11 | End: 2022-07-11

## 2022-07-11 RX ORDER — HYDRALAZINE HYDROCHLORIDE 50 MG/1
50 TABLET, FILM COATED ORAL EVERY 8 HOURS PRN
Status: DISCONTINUED | OUTPATIENT
Start: 2022-07-11 | End: 2022-07-12 | Stop reason: HOSPADM

## 2022-07-11 RX ORDER — HYDRALAZINE HYDROCHLORIDE 20 MG/ML
20 INJECTION INTRAMUSCULAR; INTRAVENOUS EVERY 6 HOURS PRN
Status: DISCONTINUED | OUTPATIENT
Start: 2022-07-11 | End: 2022-07-11

## 2022-07-11 RX ORDER — CLONIDINE HYDROCHLORIDE 0.1 MG/1
0.1 TABLET ORAL ONCE
Status: COMPLETED | OUTPATIENT
Start: 2022-07-11 | End: 2022-07-11

## 2022-07-11 RX ORDER — HYDRALAZINE HYDROCHLORIDE 20 MG/ML
20 INJECTION INTRAMUSCULAR; INTRAVENOUS EVERY 6 HOURS PRN
Status: DISCONTINUED | OUTPATIENT
Start: 2022-07-11 | End: 2022-07-12 | Stop reason: HOSPADM

## 2022-07-11 RX ORDER — HYDRALAZINE HYDROCHLORIDE 25 MG/1
25 TABLET, FILM COATED ORAL ONCE
Status: COMPLETED | OUTPATIENT
Start: 2022-07-11 | End: 2022-07-11

## 2022-07-11 RX ORDER — INSULIN ASPART 100 [IU]/ML
4 INJECTION, SOLUTION INTRAVENOUS; SUBCUTANEOUS
Status: DISCONTINUED | OUTPATIENT
Start: 2022-07-11 | End: 2022-07-12 | Stop reason: HOSPADM

## 2022-07-11 RX ORDER — HYDRALAZINE HYDROCHLORIDE 50 MG/1
100 TABLET, FILM COATED ORAL EVERY 8 HOURS
Status: DISCONTINUED | OUTPATIENT
Start: 2022-07-11 | End: 2022-07-12 | Stop reason: HOSPADM

## 2022-07-11 RX ORDER — VALSARTAN 160 MG/1
320 TABLET ORAL DAILY
Status: DISCONTINUED | OUTPATIENT
Start: 2022-07-11 | End: 2022-07-12 | Stop reason: HOSPADM

## 2022-07-11 RX ADMIN — TACROLIMUS 0.5 MG: 0.5 CAPSULE ORAL at 09:07

## 2022-07-11 RX ADMIN — FAMOTIDINE 20 MG: 20 TABLET ORAL at 10:07

## 2022-07-11 RX ADMIN — CARVEDILOL 25 MG: 25 TABLET, FILM COATED ORAL at 09:07

## 2022-07-11 RX ADMIN — CARVEDILOL 25 MG: 25 TABLET, FILM COATED ORAL at 10:07

## 2022-07-11 RX ADMIN — HYDRALAZINE HYDROCHLORIDE 100 MG: 50 TABLET ORAL at 01:07

## 2022-07-11 RX ADMIN — FLECAINIDE ACETATE 100 MG: 100 TABLET ORAL at 10:07

## 2022-07-11 RX ADMIN — HYDRALAZINE HYDROCHLORIDE 25 MG: 25 TABLET, FILM COATED ORAL at 07:07

## 2022-07-11 RX ADMIN — ATORVASTATIN CALCIUM 40 MG: 40 TABLET, FILM COATED ORAL at 09:07

## 2022-07-11 RX ADMIN — FLECAINIDE ACETATE 100 MG: 100 TABLET ORAL at 09:07

## 2022-07-11 RX ADMIN — MYCOPHENOLATE MOFETIL 1000 MG: 250 CAPSULE ORAL at 09:07

## 2022-07-11 RX ADMIN — HYDRALAZINE HYDROCHLORIDE 10 MG: 20 INJECTION, SOLUTION INTRAMUSCULAR; INTRAVENOUS at 12:07

## 2022-07-11 RX ADMIN — APIXABAN 5 MG: 5 TABLET, FILM COATED ORAL at 09:07

## 2022-07-11 RX ADMIN — INSULIN ASPART 4 UNITS: 100 INJECTION, SOLUTION INTRAVENOUS; SUBCUTANEOUS at 05:07

## 2022-07-11 RX ADMIN — ASPIRIN 81 MG CHEWABLE TABLET 81 MG: 81 TABLET CHEWABLE at 09:07

## 2022-07-11 RX ADMIN — POTASSIUM CHLORIDE 40 MEQ: 1500 TABLET, EXTENDED RELEASE ORAL at 09:07

## 2022-07-11 RX ADMIN — PREDNISONE 5 MG: 5 TABLET ORAL at 09:07

## 2022-07-11 RX ADMIN — HYDRALAZINE HYDROCHLORIDE 100 MG: 50 TABLET ORAL at 09:07

## 2022-07-11 RX ADMIN — HYDRALAZINE HYDROCHLORIDE 50 MG: 50 TABLET ORAL at 05:07

## 2022-07-11 RX ADMIN — APIXABAN 5 MG: 5 TABLET, FILM COATED ORAL at 10:07

## 2022-07-11 RX ADMIN — INSULIN DETEMIR 16 UNITS: 100 INJECTION, SOLUTION SUBCUTANEOUS at 10:07

## 2022-07-11 RX ADMIN — TACROLIMUS 0.5 MG: 0.5 CAPSULE ORAL at 05:07

## 2022-07-11 RX ADMIN — REMDESIVIR 100 MG: 100 INJECTION, POWDER, LYOPHILIZED, FOR SOLUTION INTRAVENOUS at 09:07

## 2022-07-11 RX ADMIN — HYDRALAZINE HYDROCHLORIDE 100 MG: 50 TABLET ORAL at 10:07

## 2022-07-11 RX ADMIN — CLONIDINE HYDROCHLORIDE 0.1 MG: 0.1 TABLET ORAL at 02:07

## 2022-07-11 RX ADMIN — HYDRALAZINE HYDROCHLORIDE 50 MG: 50 TABLET ORAL at 04:07

## 2022-07-11 RX ADMIN — VALSARTAN 320 MG: 160 TABLET, FILM COATED ORAL at 09:07

## 2022-07-11 NOTE — ASSESSMENT & PLAN NOTE
S/p DDKT 2016   Baseline sCr 1.4-1.6 since early 2021  Prior to 2021 was 0.9-1.1 and decreased likely secondary to DM and HTN not well controlled

## 2022-07-11 NOTE — PROGRESS NOTES
[facility] Failure [facility] ([facility]) Team notified of pt referral via [facility] Failure [facility] (automated inbasket notification) .    Patient screened today by provider and RN for enrollment to program.      Pt was deemed not a candidate for enrollment at this time related to patient current admission diagnosis/ problem will not benefit from the Heart Failure Transitional Care Program. Pt presented with weakness/SOB and currently PP is COVID 19.     Pt will require additional follow up planning per primary team.     If pt status, diagnosis, or treatment plan changes , please place AMB referral to Heart Failure Transitional [facility] Clinic ([id number]).

## 2022-07-11 NOTE — CONSULTS
[facility] - [facility] ([place] [id number])  Kidney Transplant  Consult Note    Inpatient consult to Kidney/Pancreas Transplant Medicine  Consult performed by: Osmin Owens Jr., MD  Consult ordered by: Nicola Arzate MD  Reason for consult: ddkt 2016  Assessment/Recommendations: Hold mmf, cont tacro and pred            Subjective:     History of Present Illness:   Mr. Zamorano is a 66 year old male with DDKT 2016 who presented to the ED from home for shortness of breath on exertion. He went to concert on weekend of 7/2, felt shortness of breath and tested + for covid 7/5 or 7/6 per patient at urgent care. He called and scheduled appt for outpatient remdesivir infusion scheduled for 7/11, but the day prior he felt short of breath so he came to the hospital.  He was admitted to medicine and KTM consulted for aid in management of transplanted kidney.      Past Medical and Surgical History: Mr. Zamorano has a past medical history of Anxiety (7/29/2014), Arthritis, Bilateral diabetic retinopathy (2017), CKD (chronic kidney disease) stage 2, GFR 60-89 ml/min (12/28/2016), CKD (chronic kidney disease) stage 4, GFR 15-29 ml/min (7/29/2014), Colon polyps (2014), Depression - situational (7/29/2014), Diabetes mellitus, Diabetes type 2 since 2000 (7/29/2014), Diabetic neuropathy (7/29/2014), History of cardioversion (10/3/2019), History of hepatitis C, s/p successful Rx w/ SVR24 - 9/2017 (7/29/2014), Hyperlipidemia (7/29/2014), Hypertension, Neuropathy, Organ transplant candidate (7/29/2014), Pancreatitis, and S/P cadaveric kidney transplant 11/5/2016. ESRD secondary to HTN/DMII (11/5/2016).  He has a past surgical history that includes Appendectomy; Kidney transplant; Treatment of cardiac arrhythmia (N/A, 8/26/2019); Transesophageal echocardiography (N/A, 8/26/2019); and Colonoscopy (N/A, 12/21/2020).    Past Social and Family History: Mr. Zamorano reports that he quit smoking about 5 years ago. His smoking use  "included cigarettes. He has a 16.00 pack-year smoking history. He has never used smokeless tobacco. He reports current alcohol use. He reports that he does not use drugs.His family history includes Cancer in his brother; Diabetes in his mother; Heart disease in his father and mother; Hypertension in his brother and mother.    Intake/Output - Last 3 Shifts         07/09 0700  07/10 0659 07/10 0700  07/11 0659 07/11 0700  07/12 0659    P.O.   120    Total Intake(mL/kg)   120 (1.3)    Net   +120           Urine Occurrence   1 x             Review of Systems   Constitutional: Negative.    HENT: Negative.     Eyes: Negative.    Respiratory:  Positive for shortness of breath.    Cardiovascular: Negative.    Gastrointestinal: Negative.    Endocrine: Negative.    Genitourinary: Negative.    Musculoskeletal: Negative.    Skin: Negative.    Neurological: Negative.    Psychiatric/Behavioral: Negative.     Objective:     Vital Signs (Most Recent):  Temp: 97.6 °F (36.4 °C) (07/11/22 1535)  Pulse: (!) 53 (07/11/22 1535)  Resp: 16 (07/11/22 1535)  BP: (!) 176/83 (07/11/22 1535)  SpO2: 95 % (07/11/22 1535)   Vital Signs (24h Range):  Temp:  [97.6 °F (36.4 °C)-98.1 °F (36.7 °C)] 97.6 °F (36.4 °C)  Pulse:  [48-53] 53  Resp:  [] 16  SpO2:  [93 %-98 %] 95 %  BP: (132-240)/() 176/83     Weight: 90.7 kg (200 lb)  Height: 6' 1" (185.4 cm)  Body mass index is 26.39 kg/m².    Physical Exam  Constitutional:       General: He is not in acute distress.     Appearance: He is obese.   Eyes:      General: No scleral icterus.     Extraocular Movements: Extraocular movements intact.      Pupils: Pupils are equal, round, and reactive to light.   Cardiovascular:      Rate and Rhythm: Normal rate and regular rhythm.      Heart sounds: No murmur heard.  Pulmonary:      Effort: Pulmonary effort is normal. No respiratory distress.   Abdominal:      General: There is no distension.      Palpations: Abdomen is soft.      Tenderness: There is no " "abdominal tenderness.   Musculoskeletal:      Right lower leg: No edema.      Left lower leg: No edema.   Skin:     Coloration: Skin is not jaundiced.   Neurological:      General: No focal deficit present.      Mental Status: He is alert.       Significant Labs:  Labs within the past 24 hours have been reviewed.    Diagnostics:  Chest X-Ray: No results found. However, due to the size of the patient record, not all encounters were searched. Please check Results Review for a complete set of results.    Assessment/Plan:     * COVID-19 virus infection  Remdesivir per primary       CKD (chronic kidney disease) stage 3, GFR 30-59 ml/min  S/p DDKT 2016   Baseline sCr 1.4-1.6 since early 2021  Prior to 2021 was 0.9-1.1 and decreased likely secondary to DM and HTN not well controlled    Anemia of chronic disease  Mild no need for therapy at this time      Long-term use of immunosuppressant medication  At home on tacro 0.5/0.5, mmf 1000/1000, pred 5  Hold mmf  Continue tacro and pred at home dose  Monitor for toxicities      S/P cadaveric kidney transplant 11/5/2016. ESRD secondary to HTN/DMII  11/2016  Now with stage III CKD  With secondary hyperpara not on medication  Recommend tight BP and glucose controll  Continue immunosuppression as per problem "Long-term use of immunosuppressant medication"    Hyperparathyroidism due to renal insufficiency  Repeat pth, vit d  Not currently on medciation      Prophylactic immunotherapy  See "Long-term use of immunosuppressant medication"            Osmin Owens Jr., MD  Kidney Transplant  [facility] - Intensive Care ([place] [id number])  "

## 2022-07-11 NOTE — CONSULTS
Nutrition-Related Cardiac Education      Time Spent: 15 min    Learners: Pt     Nutrition Education with handouts: Educated pt on fluid and salt restriction diet for people with HF. Encourage increase intake of fresh vegetables and meat, avoid process meat and canned food. Encourage increase intake of fatty fish and vegetables oil and avoid animal fat. Pt verbalized understanding. Emphasized the importance of diet adherence. Pt with no additional questions. No other needs identified. Left all education material with pt at bedside.    Comments: Wt stable. Denies any significant wt changes. No indicators of malnutrition.    Barriers to Learning: No    Follow up: Yes    Please consult as needed.  Thank you!    Dung TORRES

## 2022-07-11 NOTE — SUBJECTIVE & OBJECTIVE
Subjective:     History of Present Illness:   Mr. Zamorano is a 66 year old male with DDKT 2016 who presented to the ED from home for shortness of breath on exertion. He went to concert on weekend of 7/2, felt shortness of breath and tested + for covid 7/5 or 7/6 per patient at urgent care. He called and scheduled appt for outpatient remdesivir infusion scheduled for 7/11, but the day prior he felt short of breath so he came to the hospital.  He was admitted to medicine and KTM consulted for aid in management of transplanted kidney.      Past Medical and Surgical History: Mr. Zamorano has a past medical history of Anxiety (7/29/2014), Arthritis, Bilateral diabetic retinopathy (2017), CKD (chronic kidney disease) stage 2, GFR 60-89 ml/min (12/28/2016), CKD (chronic kidney disease) stage 4, GFR 15-29 ml/min (7/29/2014), Colon polyps (2014), Depression - situational (7/29/2014), Diabetes mellitus, Diabetes type 2 since 2000 (7/29/2014), Diabetic neuropathy (7/29/2014), History of cardioversion (10/3/2019), History of hepatitis C, s/p successful Rx w/ SVR24 - 9/2017 (7/29/2014), Hyperlipidemia (7/29/2014), Hypertension, Neuropathy, Organ transplant candidate (7/29/2014), Pancreatitis, and S/P cadaveric kidney transplant 11/5/2016. ESRD secondary to HTN/DMII (11/5/2016).  He has a past surgical history that includes Appendectomy; Kidney transplant; Treatment of cardiac arrhythmia (N/A, 8/26/2019); Transesophageal echocardiography (N/A, 8/26/2019); and Colonoscopy (N/A, 12/21/2020).    Past Social and Family History: Mr. Zamorano reports that he quit smoking about 5 years ago. His smoking use included cigarettes. He has a 16.00 pack-year smoking history. He has never used smokeless tobacco. He reports current alcohol use. He reports that he does not use drugs.His family history includes Cancer in his brother; Diabetes in his mother; Heart disease in his father and mother; Hypertension in his brother and  "mother.    Intake/Output - Last 3 Shifts         07/09 0700  07/10 0659 07/10 0700 07/11 0659 07/11 0700  07/12 0659    P.O.   120    Total Intake(mL/kg)   120 (1.3)    Net   +120           Urine Occurrence   1 x             Review of Systems   Constitutional: Negative.    HENT: Negative.     Eyes: Negative.    Respiratory:  Positive for shortness of breath.    Cardiovascular: Negative.    Gastrointestinal: Negative.    Endocrine: Negative.    Genitourinary: Negative.    Musculoskeletal: Negative.    Skin: Negative.    Neurological: Negative.    Psychiatric/Behavioral: Negative.     Objective:     Vital Signs (Most Recent):  Temp: 97.6 °F (36.4 °C) (07/11/22 1535)  Pulse: (!) 53 (07/11/22 1535)  Resp: 16 (07/11/22 1535)  BP: (!) 176/83 (07/11/22 1535)  SpO2: 95 % (07/11/22 1535)   Vital Signs (24h Range):  Temp:  [97.6 °F (36.4 °C)-98.1 °F (36.7 °C)] 97.6 °F (36.4 °C)  Pulse:  [48-53] 53  Resp:  [] 16  SpO2:  [93 %-98 %] 95 %  BP: (132-240)/() 176/83     Weight: 90.7 kg (200 lb)  Height: 6' 1" (185.4 cm)  Body mass index is 26.39 kg/m².    Physical Exam  Constitutional:       General: He is not in acute distress.     Appearance: He is obese.   Eyes:      General: No scleral icterus.     Extraocular Movements: Extraocular movements intact.      Pupils: Pupils are equal, round, and reactive to light.   Cardiovascular:      Rate and Rhythm: Normal rate and regular rhythm.      Heart sounds: No murmur heard.  Pulmonary:      Effort: Pulmonary effort is normal. No respiratory distress.   Abdominal:      General: There is no distension.      Palpations: Abdomen is soft.      Tenderness: There is no abdominal tenderness.   Musculoskeletal:      Right lower leg: No edema.      Left lower leg: No edema.   Skin:     Coloration: Skin is not jaundiced.   Neurological:      General: No focal deficit present.      Mental Status: He is alert.       Significant Labs:  Labs within the past 24 hours have been " reviewed.    Diagnostics:  Chest X-Ray: No results found. However, due to the size of the patient record, not all encounters were searched. Please check Results Review for a complete set of results.

## 2022-07-11 NOTE — ASSESSMENT & PLAN NOTE
- No chest pain on admission, EKG with ST changes  - Ddx: demand from CHF vs COVID  - Troponin 0.265 -> 0.090

## 2022-07-11 NOTE — PROGRESS NOTES
[facility] ([address] Medicine  Progress Note    Patient Name: Ravi Zamorano  MRN: [id number]  Patient Class: IP- Inpatient   Admission Date: 7/10/2022  Length of Stay: 1 days  Attending Physician: Nicola Arzate MD  Primary Care Provider: Mima Mack MD        Subjective:     Principal Problem:COVID-19 virus infection        HPI:  Mr. Zamorano is a 65 yo M with a h/o CKD status post  donor kidney transplant 2016, afib on AC, diabetes, and hypertension who presents with lightheadedness, productive cough with blood-streaked, DOSS, and weakness in the setting of positive COVID two days prior to admission.  Patient states symptoms started 4 days prior to admission we underwent COVID testing the following day. Symptoms continue to progress with worsening shortness of breath, productive cough, and unsteady gait resulting in patient presented to ED for further evaluation.  Patient denies myalgias, sore throat, nausea, abdominal pain, chest pain, headache, orthopnea, PND, bilateral lower extremity swelling, decrease oral intake, decrease urine output, dysuria, change in urine color, recent travel, and nonadherence to medications.    In the Ed, patient HDS. Labs notable for creatinine 1.9, potassium 3, BNP 3087, troponin 0.265, lactate 3.1.  Chest x-ray notable for possible trace left pleural effusion.  Patient given 1 L normal saline in the emergency room and potassium replacement.      Overview/Hospital Course:  No notes on file    Interval History:   Overnight patient with hypertension to the 237. Patient asymptomatic and given once time dose of hydralazine that was minimally effective. This morning /91. Continued productive cough. Patient denies headache, chest pain, fever, dyspnea, and decreased UOP.      Review of Systems   Constitutional:  Negative for activity change, appetite change, chills, diaphoresis, fatigue and fever.   HENT:  Negative for  rhinorrhea and sore throat.    Eyes:  Negative for visual disturbance.   Respiratory:  Positive for cough. Negative for chest tightness and shortness of breath.    Cardiovascular:  Negative for chest pain and palpitations.   Gastrointestinal:  Negative for abdominal pain, constipation, diarrhea and nausea.   Endocrine: Negative for cold intolerance and polyuria.   Genitourinary:  Negative for decreased urine volume, difficulty urinating, dysuria and frequency.   Musculoskeletal:  Positive for gait problem. Negative for arthralgias and myalgias.   Skin:  Negative for rash and wound.   Neurological:  Positive for weakness. Negative for dizziness, numbness and headaches.   Psychiatric/Behavioral:  Negative for agitation, behavioral problems and confusion.    Objective:     Vital Signs (Most Recent):  Temp: 97.6 °F (36.4 °C) (07/11/22 1535)  Pulse: (!) 53 (07/11/22 1535)  Resp: 16 (07/11/22 1535)  BP: (!) 176/83 (07/11/22 1535)  SpO2: 95 % (07/11/22 1535)   Vital Signs (24h Range):  Temp:  [97.6 °F (36.4 °C)-98.1 °F (36.7 °C)] 97.6 °F (36.4 °C)  Pulse:  [48-53] 53  Resp:  [] 16  SpO2:  [93 %-98 %] 95 %  BP: (132-240)/() 176/83     Weight: 90.7 kg (200 lb)  Body mass index is 26.39 kg/m².    Intake/Output Summary (Last 24 hours) at 7/11/2022 1539  Last data filed at 7/11/2022 0918  Gross per 24 hour   Intake 120 ml   Output --   Net 120 ml      Physical Exam  Constitutional:       Appearance: Normal appearance. He is normal weight. He is not ill-appearing.   HENT:      Head: Normocephalic and atraumatic.      Mouth/Throat:      Mouth: Mucous membranes are moist.   Eyes:      Extraocular Movements: Extraocular movements intact.      Conjunctiva/sclera: Conjunctivae normal.   Neck:      Vascular: No JVD.   Cardiovascular:      Rate and Rhythm: Normal rate and regular rhythm.      Heart sounds: No murmur heard.  Pulmonary:      Effort: Pulmonary effort is normal. No respiratory distress.      Breath sounds:  Normal breath sounds. No wheezing or rales.   Abdominal:      General: Abdomen is flat. There is no distension.      Palpations: Abdomen is soft.      Tenderness: There is no abdominal tenderness. There is no guarding.   Musculoskeletal:         General: No swelling or tenderness.      Right lower leg: No edema.      Left lower leg: No edema.   Skin:     Findings: No rash.   Neurological:      General: No focal deficit present.      Mental Status: He is alert and oriented to person, place, and time. Mental status is at baseline.   Psychiatric:         Mood and Affect: Mood normal.         Behavior: Behavior normal.       Significant Labs: CBC:   Recent Labs   Lab 07/10/22  1124 07/11/22  0239   WBC 5.77 3.56*   HGB 11.5* 11.6*   HCT 38.0* 38.1*   * 94*     CMP:   Recent Labs   Lab 07/10/22  1124 07/11/22  0239   * 134*   K 3.0* 3.3*   CL 96 98   CO2 24 24   GLU 99 165*   BUN 27* 30*   CREATININE 1.9* 1.6*   CALCIUM 8.8 8.6*   PROT 5.9* 5.7*   ALBUMIN 2.6* 2.4*   BILITOT 0.6 0.4   ALKPHOS 51* 49*   AST 14 14   ALT 5* <5*   ANIONGAP 14 12   EGFRNONAA 36.4* 44.9*       Significant Imaging: I have reviewed all pertinent imaging results/findings within the past 24 hours.      Assessment/Plan:      * COVID-19 virus infection  - Isolation:   - Airborne, Contact and Droplet Precautions  - Cohort patients into COVID units  - N95 mask, wear eye protection  - 20 second hand hygiene              - Limit visitors per hospital policy              - Consolidating lab draws, nursing care, provider visits, and interventions    - Diagnostics: (leukopenia, hyponatremia, hyperferritinemia, elevated troponin, elevated d-dimer, age, and comorbidities are significant predictors of poor clinical outcome)  CBC, CMP, Ferritin, CRP, BNP, Troponin, Portable CXR and UA and culture    - Management:  - Remdesivir for 5 days while hospitalized given Covid Risk score 6 on admission  - Hold dexamethasone at this time as patient not  hypoxic   - Supplemental O2 to maintain SpO2 >92%  - Telemetry  - Iintermittent Pulse Ox  - Albuterol treatment PRN    CKD (chronic kidney disease) stage 2, GFR 60-89 ml/min  S/p transplant in 2016 on immunosuppression. Baseline creatinine 1.5/1.6. Report good PO intake and medication adherence.  - Admission creatinine 1.9   - KTM consulted  - Tacro level daily  - Continue tacrolimus, cell cept, prednisone   - Avoid nephrotoxin medications, diuretics   - Creatinine improving      Type 2 diabetes mellitus with stage 2 chronic kidney disease, with long-term current use of insulin  Home Lantus 35U qhs (intermitent usage), Novolog (18u, 16u , 16u), SSI, Ozempic  - Detemir to 12u qhs  - Aspart to 4u tid   - SSI  - Hypoglycemia protocol      Elevated troponin I level  - No chest pain on admission, EKG with ST changes  - Ddx: demand from CHF vs COVID  - Troponin 0.265 -> 0.090        High serum lactate  - Admit lactate 3.1 - > 3  - Ddx: infection vs CHF  - s/p 1L IVF  - Trend lactate      Chronic diastolic congestive heart failure  - Prior TTE 6/202: EF 55%, G III DD, mild MR, PASP 42, CVP 3  - Admit BNP 3087 (prior 892 in 2014), elevation may be 2/2 COVID infection  - TTE pending  - Holding diuresis  - Continue home coreg  - CHF Pathway    Hypokalemia  - Admit K 3  - Replace as needed  - Trend BMP      Persistent atrial fibrillation  Patient with Persistent (7 days or more) atrial fibrillation which is controlled currently with Beta Blocker and Flecanide. Patient is currently in sinus rhythm.[id number] Score: 1. Anticoagulation indicated. Anticoagulation done with Eliquis.  - s/p DCCV in 8/2010, and was started on flecainide 100 mg bid post-procedure  - Continue Coreg, Flecainide, Eliquis  - Telemetry        Hypertension since 2000  - Continue home Coreg  - Continue Hydralazine 25mg BID  - Holding HCTZ, Valsartan due elevated creatinine      Long-term use of immunosuppressant medication  - See above      S/P cadaveric  kidney transplant 11/5/2016. ESRD secondary to HTN/DMII  - See above      Hyperlipidemia  - Continue home atorvastatin       Diabetic polyneuropathy associated with type 2 diabetes mellitus  - Hold home gabapentin due to elevated creatinine        VTE Risk Mitigation (From admission, onward)         Ordered     apixaban tablet 5 mg  2 times daily         07/10/22 1437     IP VTE HIGH RISK PATIENT  Once         07/10/22 1425     Place sequential compression device  Until discontinued         07/10/22 1425                Discharge Planning   UBALDO: 7/13/2022     Code Status: Full Code   Is the patient medically ready for discharge?: No    Reason for patient still in hospital (select all that apply): Patient trending condition, Laboratory test, Treatment, Consult recommendations and Pending disposition  Discharge Plan A: Home                  Nicola Arzate MD  Department of Hospital Medicine   [facility] - Intensive Care ([place]-[facility])

## 2022-07-11 NOTE — ASSESSMENT & PLAN NOTE
"11/2016  Now with stage III CKD  With secondary hyperpara not on medication  Recommend tight BP and glucose controll  Continue immunosuppression as per problem "Long-term use of immunosuppressant medication"  "

## 2022-07-11 NOTE — ASSESSMENT & PLAN NOTE
S/p transplant in 2016 on immunosuppression. Baseline creatinine 1.5/1.6. Report good PO intake and medication adherence.  - Admission creatinine 1.9   - KTM consulted  - Tacro level daily  - Continue tacrolimus, cell cept, prednisone   - Avoid nephrotoxin medications, diuretics   - Creatinine improving

## 2022-07-11 NOTE — TELEPHONE ENCOUNTER
Attempted to call patient for EUA infusion on 07/08/22 - No answer    07/11/22 - Patient is currently admitted to hospital

## 2022-07-11 NOTE — PLAN OF CARE
[facility] - Intensive Care ([facility]-)  Initial Discharge Assessment       Primary Care Provider: Mima Mack MD    Admission Diagnosis: Shortness of breath [R06.02]  CHF (congestive heart failure) [I50.9]  Kidney transplanted [Z94.0]  Acute on chronic heart failure, unspecified heart failure type [I50.9]  COVID-19 virus infection [U07.1]    Admission Date: 7/10/2022  Expected Discharge Date: 7/13/2022    Discharge Barriers Identified: (P) None    Payor: MEDICARE / Plan: MEDICARE A ONLY / Product Type: Government /     Extended Emergency Contact Information  Primary Emergency Contact: Erika Zamorano  Address: [address] [place] [address] [facility]  Home Phone: [phone]  Mobile Phone: [phone]  Relation: Spouse    Discharge Plan A: (P) Home  Discharge Plan B: (P) Home with family      [facility]  [address] [address]  Phone: [phone] Fax: [phone]    [facility] [place], [place] - [address] [facility]  [address] [facility] [address]  Phone: [phone] Fax: [phone]    [place] [id number] [facility] [address] [facility]  [address] [facility] [address]  Phone: [phone] Fax: [phone]    [id number] - [facility] [address] [facility] AT [address] [address]  Phone: [phone] Fax: [phone]    [facility] - [place], [place] - [address]  [address] [facility] [place] [address]  Phone: [phone] Fax: [phone]      Initial Assessment (most recent)       Adult Discharge Assessment - 07/11/22 1443          Discharge Assessment    Assessment Type Discharge Planning Assessment (P)      Confirmed/corrected address, phone number and insurance Yes (P)      Confirmed Demographics Correct on Facesheet (P)      Source of  Information patient (P)      Does patient/caregiver understand observation status Yes (P)      Communicated UBALDO with patient/caregiver Date not available/Unable to determine (P)      Reason For Admission COVID-19 virus infection (P)      Lives With spouse (P)      Facility Arrived From: Home (P)      Do you expect to return to your current living situation? Yes (P)      Do you have help at home or someone to help you manage your care at home? No (P)      Prior to hospitilization cognitive status: Alert/Oriented;No Deficits (P)      Current cognitive status: Alert/Oriented;No Deficits (P)      Walking or Climbing Stairs Difficulty none (P)      Dressing/Bathing Difficulty none (P)      Home Accessibility wheelchair accessible (P)      Home Layout Able to live on 1st floor (P)      Equipment Currently Used at Home none (P)      Readmission within 30 days? No (P)      Patient currently being followed by outpatient case management? No (P)      Do you currently have service(s) that help you manage your care at home? No (P)      Do you have any problems affording any of your prescribed medications? No (P)      Is the patient taking medications as prescribed? yes (P)      Who is going to help you get home at discharge? Spouse Erika [phone] (P)      How do you get to doctors appointments? car, drives self;family or friend will provide (P)      Are you on dialysis? No (P)      Do you take coumadin? No (P)      Discharge Plan A Home (P)      Discharge Plan B Home with family (P)      DME Needed Upon Discharge  none (P)      Discharge Plan discussed with: Patient (P)      Discharge Barriers Identified None (P)         Relationship/Environment    Name(s) of Who Lives With Patient Spouse Erkia [phone] (P)                       Paris Bell CD, MSW, LMSW, RSW   Case Management  [facility]  Email: [email or web address]

## 2022-07-11 NOTE — SUBJECTIVE & OBJECTIVE
Interval History:   Overnight patient with hypertension to the 237. Patient asymptomatic and given once time dose of hydralazine that was minimally effective. This morning /91. Continued productive cough. Patient denies headache, chest pain, fever, dyspnea, and decreased UOP.      Review of Systems   Constitutional:  Negative for activity change, appetite change, chills, diaphoresis, fatigue and fever.   HENT:  Negative for rhinorrhea and sore throat.    Eyes:  Negative for visual disturbance.   Respiratory:  Positive for cough. Negative for chest tightness and shortness of breath.    Cardiovascular:  Negative for chest pain and palpitations.   Gastrointestinal:  Negative for abdominal pain, constipation, diarrhea and nausea.   Endocrine: Negative for cold intolerance and polyuria.   Genitourinary:  Negative for decreased urine volume, difficulty urinating, dysuria and frequency.   Musculoskeletal:  Positive for gait problem. Negative for arthralgias and myalgias.   Skin:  Negative for rash and wound.   Neurological:  Positive for weakness. Negative for dizziness, numbness and headaches.   Psychiatric/Behavioral:  Negative for agitation, behavioral problems and confusion.    Objective:     Vital Signs (Most Recent):  Temp: 97.6 °F (36.4 °C) (07/11/22 1535)  Pulse: (!) 53 (07/11/22 1535)  Resp: 16 (07/11/22 1535)  BP: (!) 176/83 (07/11/22 1535)  SpO2: 95 % (07/11/22 1535)   Vital Signs (24h Range):  Temp:  [97.6 °F (36.4 °C)-98.1 °F (36.7 °C)] 97.6 °F (36.4 °C)  Pulse:  [48-53] 53  Resp:  [] 16  SpO2:  [93 %-98 %] 95 %  BP: (132-240)/() 176/83     Weight: 90.7 kg (200 lb)  Body mass index is 26.39 kg/m².    Intake/Output Summary (Last 24 hours) at 7/11/2022 1539  Last data filed at 7/11/2022 0918  Gross per 24 hour   Intake 120 ml   Output --   Net 120 ml      Physical Exam  Constitutional:       Appearance: Normal appearance. He is normal weight. He is not ill-appearing.   HENT:      Head:  Normocephalic and atraumatic.      Mouth/Throat:      Mouth: Mucous membranes are moist.   Eyes:      Extraocular Movements: Extraocular movements intact.      Conjunctiva/sclera: Conjunctivae normal.   Neck:      Vascular: No JVD.   Cardiovascular:      Rate and Rhythm: Normal rate and regular rhythm.      Heart sounds: No murmur heard.  Pulmonary:      Effort: Pulmonary effort is normal. No respiratory distress.      Breath sounds: Normal breath sounds. No wheezing or rales.   Abdominal:      General: Abdomen is flat. There is no distension.      Palpations: Abdomen is soft.      Tenderness: There is no abdominal tenderness. There is no guarding.   Musculoskeletal:         General: No swelling or tenderness.      Right lower leg: No edema.      Left lower leg: No edema.   Skin:     Findings: No rash.   Neurological:      General: No focal deficit present.      Mental Status: He is alert and oriented to person, place, and time. Mental status is at baseline.   Psychiatric:         Mood and Affect: Mood normal.         Behavior: Behavior normal.       Significant Labs: CBC:   Recent Labs   Lab 07/10/22  1124 07/11/22  0239   WBC 5.77 3.56*   HGB 11.5* 11.6*   HCT 38.0* 38.1*   * 94*     CMP:   Recent Labs   Lab 07/10/22  1124 07/11/22  0239   * 134*   K 3.0* 3.3*   CL 96 98   CO2 24 24   GLU 99 165*   BUN 27* 30*   CREATININE 1.9* 1.6*   CALCIUM 8.8 8.6*   PROT 5.9* 5.7*   ALBUMIN 2.6* 2.4*   BILITOT 0.6 0.4   ALKPHOS 51* 49*   AST 14 14   ALT 5* <5*   ANIONGAP 14 12   EGFRNONAA 36.4* 44.9*       Significant Imaging: I have reviewed all pertinent imaging results/findings within the past 24 hours.

## 2022-07-11 NOTE — HPI
Mr. Zamorano is a 66 year old male with DDKT 2016 who presented to the ED from home for shortness of breath on exertion. He went to concert on weekend of 7/2, felt shortness of breath and tested + for covid 7/5 or 7/6 per patient at urgent care. He called and scheduled appt for outpatient remdesivir infusion scheduled for 7/11, but the day prior he felt short of breath so he came to the hospital.  He was admitted to medicine and KTM consulted for aid in management of transplanted kidney.

## 2022-07-11 NOTE — ASSESSMENT & PLAN NOTE
At home on tacro 0.5/0.5, mmf 1000/1000, pred 5  Hold mmf  Continue tacro and pred at home dose  Monitor for toxicities

## 2022-07-11 NOTE — CARE UPDATE
"RAPID RESPONSE NURSE CHART REVIEW       Chart Reviewed: 07/11/2022, 1:07 AM    MRN: [id number]  Bed: 99131/03562 A    Dx: COVID-19 virus infection    Ravi Zamorano has a past medical history of Anxiety, Arthritis, Bilateral diabetic retinopathy, CKD (chronic kidney disease) stage 2, GFR 60-89 ml/min, CKD (chronic kidney disease) stage 4, GFR 15-29 ml/min, Colon polyps, Depression - situational, Diabetes mellitus, Diabetes type 2 since 2000, Diabetic neuropathy, History of cardioversion, History of hepatitis C, s/p successful Rx w/ SVR24 - 9/2017, Hyperlipidemia, Hypertension, Neuropathy, Organ transplant candidate, Pancreatitis, and S/P cadaveric kidney transplant 11/5/2016. ESRD secondary to HTN/DMII.    Last VS: BP (!) 210/100   Pulse (!) 48   Temp 97.7 °F (36.5 °C)   Resp 18   Ht 6' 1" (1.854 m)   Wt 90.7 kg (200 lb)   SpO2 (!) 93%   BMI 26.39 kg/m²     24H Vital Sign Range:  Temp:  [97.7 °F (36.5 °C)-98.3 °F (36.8 °C)]   Pulse:  [47-55]   Resp:  []   BP: (126-240)/()   SpO2:  [93 %-99 %]     Level of Consciousness (AVPU): alert    Recent Labs     07/10/22  1124   WBC 5.77   HGB 11.5*   HCT 38.0*   *       Recent Labs     07/10/22  1124   *   K 3.0*   CL 96   CO2 24   CREATININE 1.9*   GLU 99        No results for input(s): PH, PCO2, PO2, HCO3, POCSATURATED, BE in the last 72 hours.     OXYGEN:        O2 Device (Oxygen Therapy): room air    MEWS score: 3    Bedside RN, Marialuisa contacted. No concerns verbalized at this time. Instructed to call [phone] for further concerns or assistance.    Devin Wu RN      "

## 2022-07-11 NOTE — ASSESSMENT & PLAN NOTE
Home Lantus 35U qhs (intermitent usage), Novolog (18u, 16u , 16u), SSI, Ozempic  - Detemir to 12u qhs  - Aspart to 4u tid   - SSI  - Hypoglycemia protocol

## 2022-07-11 NOTE — ASSESSMENT & PLAN NOTE
Patient with Persistent (7 days or more) atrial fibrillation which is controlled currently with Beta Blocker and Flecanide. Patient is currently in sinus rhythm.[id number] Score: 1. Anticoagulation indicated. Anticoagulation done with Eliquis.  - s/p DCCV in 8/2010, and was started on flecainide 100 mg bid post-procedure  - Continue Coreg, Flecainide, Eliquis  - Telemetry

## 2022-07-12 VITALS
SYSTOLIC BLOOD PRESSURE: 163 MMHG | TEMPERATURE: 99 F | HEART RATE: 55 BPM | RESPIRATION RATE: 17 BRPM | OXYGEN SATURATION: 97 % | BODY MASS INDEX: 26.51 KG/M2 | DIASTOLIC BLOOD PRESSURE: 70 MMHG | WEIGHT: 200 LBS | HEIGHT: 73 IN

## 2022-07-12 DIAGNOSIS — Z94.0 KIDNEY REPLACED BY TRANSPLANT: Primary | ICD-10-CM

## 2022-07-12 LAB
ALBUMIN SERPL BCP-MCNC: 2.5 G/DL (ref 3.5–5.2)
ALP SERPL-CCNC: 47 U/L (ref 55–135)
ALT SERPL W/O P-5'-P-CCNC: 6 U/L (ref 10–44)
ANION GAP SERPL CALC-SCNC: 9 MMOL/L (ref 8–16)
AST SERPL-CCNC: 14 U/L (ref 10–40)
BASOPHILS # BLD AUTO: 0.02 K/UL (ref 0–0.2)
BASOPHILS NFR BLD: 0.5 % (ref 0–1.9)
BILIRUB SERPL-MCNC: 0.4 MG/DL (ref 0.1–1)
BUN SERPL-MCNC: 30 MG/DL (ref 8–23)
CALCIUM SERPL-MCNC: 8.5 MG/DL (ref 8.7–10.5)
CHLORIDE SERPL-SCNC: 100 MMOL/L (ref 95–110)
CO2 SERPL-SCNC: 25 MMOL/L (ref 23–29)
CREAT SERPL-MCNC: 1.4 MG/DL (ref 0.5–1.4)
DIFFERENTIAL METHOD: ABNORMAL
EOSINOPHIL # BLD AUTO: 0.1 K/UL (ref 0–0.5)
EOSINOPHIL NFR BLD: 1.3 % (ref 0–8)
ERYTHROCYTE [DISTWIDTH] IN BLOOD BY AUTOMATED COUNT: 15.2 % (ref 11.5–14.5)
EST. GFR  (AFRICAN AMERICAN): >60 ML/MIN/1.73 M^2
EST. GFR  (NON AFRICAN AMERICAN): 52.7 ML/MIN/1.73 M^2
GLUCOSE SERPL-MCNC: 115 MG/DL (ref 70–110)
HCT VFR BLD AUTO: 36.9 % (ref 40–54)
HGB BLD-MCNC: 11.3 G/DL (ref 14–18)
IMM GRANULOCYTES # BLD AUTO: 0.02 K/UL (ref 0–0.04)
IMM GRANULOCYTES NFR BLD AUTO: 0.5 % (ref 0–0.5)
LYMPHOCYTES # BLD AUTO: 0.7 K/UL (ref 1–4.8)
LYMPHOCYTES NFR BLD: 17.9 % (ref 18–48)
MAGNESIUM SERPL-MCNC: 1.8 MG/DL (ref 1.6–2.6)
MCH RBC QN AUTO: 24.3 PG (ref 27–31)
MCHC RBC AUTO-ENTMCNC: 30.6 G/DL (ref 32–36)
MCV RBC AUTO: 79 FL (ref 82–98)
MONOCYTES # BLD AUTO: 1 K/UL (ref 0.3–1)
MONOCYTES NFR BLD: 26 % (ref 4–15)
NEUTROPHILS # BLD AUTO: 2.1 K/UL (ref 1.8–7.7)
NEUTROPHILS NFR BLD: 53.8 % (ref 38–73)
NRBC BLD-RTO: 0 /100 WBC
PHOSPHATE SERPL-MCNC: 2.4 MG/DL (ref 2.7–4.5)
PLATELET # BLD AUTO: 93 K/UL (ref 150–450)
PMV BLD AUTO: ABNORMAL FL (ref 9.2–12.9)
POCT GLUCOSE: 153 MG/DL (ref 70–110)
POCT GLUCOSE: 206 MG/DL (ref 70–110)
POCT GLUCOSE: 87 MG/DL (ref 70–110)
POTASSIUM SERPL-SCNC: 3.5 MMOL/L (ref 3.5–5.1)
PROT SERPL-MCNC: 5.2 G/DL (ref 6–8.4)
PTH-INTACT SERPL-MCNC: 128.8 PG/ML (ref 9–77)
RBC # BLD AUTO: 4.65 M/UL (ref 4.6–6.2)
SODIUM SERPL-SCNC: 134 MMOL/L (ref 136–145)
TACROLIMUS BLD-MCNC: 7.3 NG/ML (ref 5–15)
WBC # BLD AUTO: 3.85 K/UL (ref 3.9–12.7)

## 2022-07-12 PROCEDURE — 99233 SBSQ HOSP IP/OBS HIGH 50: CPT | Mod: ,,, | Performed by: INTERNAL MEDICINE

## 2022-07-12 PROCEDURE — 63600175 PHARM REV CODE 636 W HCPCS: Performed by: STUDENT IN AN ORGANIZED HEALTH CARE EDUCATION/TRAINING PROGRAM

## 2022-07-12 PROCEDURE — 99239 PR HOSPITAL DISCHARGE DAY,>30 MIN: ICD-10-PCS | Mod: CR,,, | Performed by: STUDENT IN AN ORGANIZED HEALTH CARE EDUCATION/TRAINING PROGRAM

## 2022-07-12 PROCEDURE — 80053 COMPREHEN METABOLIC PANEL: CPT | Performed by: STUDENT IN AN ORGANIZED HEALTH CARE EDUCATION/TRAINING PROGRAM

## 2022-07-12 PROCEDURE — 25000003 PHARM REV CODE 250: Performed by: STUDENT IN AN ORGANIZED HEALTH CARE EDUCATION/TRAINING PROGRAM

## 2022-07-12 PROCEDURE — 99239 HOSP IP/OBS DSCHRG MGMT >30: CPT | Mod: CR,,, | Performed by: STUDENT IN AN ORGANIZED HEALTH CARE EDUCATION/TRAINING PROGRAM

## 2022-07-12 PROCEDURE — 80197 ASSAY OF TACROLIMUS: CPT | Performed by: STUDENT IN AN ORGANIZED HEALTH CARE EDUCATION/TRAINING PROGRAM

## 2022-07-12 PROCEDURE — 83970 ASSAY OF PARATHORMONE: CPT | Performed by: STUDENT IN AN ORGANIZED HEALTH CARE EDUCATION/TRAINING PROGRAM

## 2022-07-12 PROCEDURE — 36415 COLL VENOUS BLD VENIPUNCTURE: CPT | Performed by: STUDENT IN AN ORGANIZED HEALTH CARE EDUCATION/TRAINING PROGRAM

## 2022-07-12 PROCEDURE — 85025 COMPLETE CBC W/AUTO DIFF WBC: CPT | Performed by: STUDENT IN AN ORGANIZED HEALTH CARE EDUCATION/TRAINING PROGRAM

## 2022-07-12 PROCEDURE — 83735 ASSAY OF MAGNESIUM: CPT | Performed by: STUDENT IN AN ORGANIZED HEALTH CARE EDUCATION/TRAINING PROGRAM

## 2022-07-12 PROCEDURE — 99233 PR SUBSEQUENT HOSPITAL CARE,LEVL III: ICD-10-PCS | Mod: ,,, | Performed by: INTERNAL MEDICINE

## 2022-07-12 PROCEDURE — 84100 ASSAY OF PHOSPHORUS: CPT | Performed by: STUDENT IN AN ORGANIZED HEALTH CARE EDUCATION/TRAINING PROGRAM

## 2022-07-12 RX ORDER — HYDRALAZINE HYDROCHLORIDE 100 MG/1
100 TABLET, FILM COATED ORAL EVERY 8 HOURS
Qty: 90 TABLET | Refills: 11 | Status: SHIPPED | OUTPATIENT
Start: 2022-07-12 | End: 2022-10-27 | Stop reason: SDUPTHER

## 2022-07-12 RX ORDER — FUROSEMIDE 20 MG/1
20 TABLET ORAL DAILY
Status: DISCONTINUED | OUTPATIENT
Start: 2022-07-12 | End: 2022-07-12

## 2022-07-12 RX ORDER — CHLORTHALIDONE 25 MG/1
12.5 TABLET ORAL DAILY
Qty: 15 TABLET | Refills: 11 | Status: SHIPPED | OUTPATIENT
Start: 2022-07-13 | End: 2022-07-27 | Stop reason: SDUPTHER

## 2022-07-12 RX ADMIN — INSULIN ASPART 4 UNITS: 100 INJECTION, SOLUTION INTRAVENOUS; SUBCUTANEOUS at 01:07

## 2022-07-12 RX ADMIN — FLECAINIDE ACETATE 100 MG: 100 TABLET ORAL at 08:07

## 2022-07-12 RX ADMIN — APIXABAN 5 MG: 5 TABLET, FILM COATED ORAL at 08:07

## 2022-07-12 RX ADMIN — ASPIRIN 81 MG CHEWABLE TABLET 81 MG: 81 TABLET CHEWABLE at 08:07

## 2022-07-12 RX ADMIN — HYDRALAZINE HYDROCHLORIDE 100 MG: 50 TABLET ORAL at 01:07

## 2022-07-12 RX ADMIN — REMDESIVIR 100 MG: 100 INJECTION, POWDER, LYOPHILIZED, FOR SOLUTION INTRAVENOUS at 08:07

## 2022-07-12 RX ADMIN — ATORVASTATIN CALCIUM 40 MG: 40 TABLET, FILM COATED ORAL at 08:07

## 2022-07-12 RX ADMIN — TACROLIMUS 0.5 MG: 0.5 CAPSULE ORAL at 08:07

## 2022-07-12 RX ADMIN — CHLORTHALIDONE 12.5 MG: 25 TABLET ORAL at 10:07

## 2022-07-12 RX ADMIN — VALSARTAN 320 MG: 160 TABLET, FILM COATED ORAL at 08:07

## 2022-07-12 RX ADMIN — HYDRALAZINE HYDROCHLORIDE 20 MG: 20 INJECTION, SOLUTION INTRAMUSCULAR; INTRAVENOUS at 04:07

## 2022-07-12 RX ADMIN — PREDNISONE 5 MG: 5 TABLET ORAL at 08:07

## 2022-07-12 RX ADMIN — HYDRALAZINE HYDROCHLORIDE 100 MG: 50 TABLET ORAL at 06:07

## 2022-07-12 RX ADMIN — CARVEDILOL 25 MG: 25 TABLET, FILM COATED ORAL at 08:07

## 2022-07-12 NOTE — SUBJECTIVE & OBJECTIVE
Subjective:   History of Present Illness:  Mr. Zamorano is a 66 year old male with DDKT 2016 who presented to the ED from home for shortness of breath on exertion. He went to concert on weekend of 7/2, felt shortness of breath and tested + for covid 7/5 or 7/6 per patient at urgent care. He called and scheduled appt for outpatient remdesivir infusion scheduled for 7/11, but the day prior he felt short of breath so he came to the hospital.  He was admitted to medicine and KTM consulted for aid in management of transplanted kidney.    Mr. Zamorano is a 66 y.o. year old male who is status post Kidney Transplant - 11/5/2016 - Not Followed  (#1).    His maintenance immunosuppression consists of:   Immunosuppressants (From admission, onward)                Start     Stop Route Frequency Ordered    07/10/22 1800  tacrolimus capsule 0.5 mg         -- Oral 2 times daily 07/10/22 1437            Hospital Course:  No notes on file    Interval History:  Improvement in symptoms. Sitting up in bed eating breakfast, not short of breath. No new complaints. Kidney function and tacro at baseline.    Past Medical, Surgical, Family, and Social History:   Unchanged from H&P.    Scheduled Meds:   apixaban  5 mg Oral BID    aspirin  81 mg Oral Daily    atorvastatin  40 mg Oral Daily    carvediloL  25 mg Oral BID    chlorthalidone  12.5 mg Oral Daily    famotidine  20 mg Oral QHS    flecainide  100 mg Oral Q12H    hydrALAZINE  100 mg Oral Q8H    insulin aspart U-100  4 Units Subcutaneous TIDWM    insulin detemir U-100  16 Units Subcutaneous QHS    predniSONE  5 mg Oral Daily    remdesivir infusion  100 mg Intravenous Daily    tacrolimus  0.5 mg Oral BID    valsartan  320 mg Oral Daily     Continuous Infusions:  PRN Meds:albuterol, dextrose 10%, dextrose 10%, glucagon (human recombinant), glucose, glucose, hydrALAZINE, hydrALAZINE, insulin aspart U-100, meclizine, melatonin, sodium chloride 0.9%    Intake/Output - Last 3 Shifts          "07/10 0700 07/11 0659 07/11 0700 07/12 0659 07/12 0700 07/13 0659    P.O.  390 240    Total Intake(mL/kg)  390 (4.3) 240 (2.6)    Net  +390 +240           Urine Occurrence  4 x 1 x             Review of Systems   Constitutional: Negative.    HENT: Negative.     Eyes: Negative.    Respiratory:  Positive for shortness of breath.    Cardiovascular: Negative.    Gastrointestinal: Negative.    Endocrine: Negative.    Genitourinary: Negative.    Musculoskeletal: Negative.    Skin: Negative.    Neurological: Negative.    Psychiatric/Behavioral: Negative.      Objective:     Vital Signs (Most Recent):  Temp: 98.5 °F (36.9 °C) (07/12/22 1130)  Pulse: (!) 55 (07/12/22 1130)  Resp: 17 (07/12/22 1130)  BP: (!) 163/70 (07/12/22 1345)  SpO2: 97 % (07/12/22 1130)   Vital Signs (24h Range):  Temp:  [97.6 °F (36.4 °C)-98.5 °F (36.9 °C)] 98.5 °F (36.9 °C)  Pulse:  [51-57] 55  Resp:  [16-18] 17  SpO2:  [94 %-99 %] 97 %  BP: (147-219)/() 163/70     Weight: 90.7 kg (200 lb)  Height: 6' 1" (185.4 cm)  Body mass index is 26.39 kg/m².    Physical Exam  Constitutional:       General: He is not in acute distress.     Appearance: He is obese.   Eyes:      General: No scleral icterus.     Extraocular Movements: Extraocular movements intact.      Pupils: Pupils are equal, round, and reactive to light.   Cardiovascular:      Rate and Rhythm: Normal rate and regular rhythm.      Heart sounds: No murmur heard.  Pulmonary:      Effort: Pulmonary effort is normal. No respiratory distress.   Abdominal:      General: There is no distension.      Palpations: Abdomen is soft.      Tenderness: There is no abdominal tenderness.   Musculoskeletal:      Right lower leg: No edema.      Left lower leg: No edema.   Skin:     Coloration: Skin is not jaundiced.   Neurological:      General: No focal deficit present.      Mental Status: He is alert.       Laboratory:  Labs within the past 24 hours have been reviewed.    Diagnostic Results:  None  "

## 2022-07-12 NOTE — PROGRESS NOTES
[facility] - Intensive Care ([facility]-)  Kidney Transplant  Progress Note      Reason for Follow-up: Reassessment of Kidney Transplant - 11/5/2016 - Not Followed  (#1) recipient and management of immunosuppression.    ORGAN:  RIGHT KIDNEY   Donor Type:  Donation after Brain Death   PHS Increased Risk: yes         Subjective:   History of Present Illness:  Mr. Zamorano is a 66 year old male with DDKT 2016 who presented to the ED from home for shortness of breath on exertion. He went to concert on weekend of 7/2, felt shortness of breath and tested + for covid 7/5 or 7/6 per patient at urgent care. He called and scheduled appt for outpatient remdesivir infusion scheduled for 7/11, but the day prior he felt short of breath so he came to the hospital.  He was admitted to medicine and KTM consulted for aid in management of transplanted kidney.    Mr. Zamorano is a 66 y.o. year old male who is status post Kidney Transplant - 11/5/2016 - Not Followed  (#1).    His maintenance immunosuppression consists of:   Immunosuppressants (From admission, onward)                Start     Stop Route Frequency Ordered    07/10/22 1800  tacrolimus capsule 0.5 mg         -- Oral 2 times daily 07/10/22 1437            Hospital Course:  No notes on file    Interval History:  Improvement in symptoms. Sitting up in bed eating breakfast, not short of breath. No new complaints. Kidney function and tacro at baseline.    Past Medical, Surgical, Family, and Social History:   Unchanged from H&P.    Scheduled Meds:   apixaban  5 mg Oral BID    aspirin  81 mg Oral Daily    atorvastatin  40 mg Oral Daily    carvediloL  25 mg Oral BID    chlorthalidone  12.5 mg Oral Daily    famotidine  20 mg Oral QHS    flecainide  100 mg Oral Q12H    hydrALAZINE  100 mg Oral Q8H    insulin aspart U-100  4 Units Subcutaneous TIDWM    insulin detemir U-100  16 Units Subcutaneous QHS    predniSONE  5 mg Oral Daily    remdesivir infusion  100 mg Intravenous  "Daily    tacrolimus  0.5 mg Oral BID    valsartan  320 mg Oral Daily     Continuous Infusions:  PRN Meds:albuterol, dextrose 10%, dextrose 10%, glucagon (human recombinant), glucose, glucose, hydrALAZINE, hydrALAZINE, insulin aspart U-100, meclizine, melatonin, sodium chloride 0.9%    Intake/Output - Last 3 Shifts         07/10 0700  07/11 0659 07/11 0700 07/12 0659 07/12 0700 07/13 0659    P.O.  390 240    Total Intake(mL/kg)  390 (4.3) 240 (2.6)    Net  +390 +240           Urine Occurrence  4 x 1 x             Review of Systems   Constitutional: Negative.    HENT: Negative.     Eyes: Negative.    Respiratory:  Positive for shortness of breath.    Cardiovascular: Negative.    Gastrointestinal: Negative.    Endocrine: Negative.    Genitourinary: Negative.    Musculoskeletal: Negative.    Skin: Negative.    Neurological: Negative.    Psychiatric/Behavioral: Negative.      Objective:     Vital Signs (Most Recent):  Temp: 98.5 °F (36.9 °C) (07/12/22 1130)  Pulse: (!) 55 (07/12/22 1130)  Resp: 17 (07/12/22 1130)  BP: (!) 163/70 (07/12/22 1345)  SpO2: 97 % (07/12/22 1130)   Vital Signs (24h Range):  Temp:  [97.6 °F (36.4 °C)-98.5 °F (36.9 °C)] 98.5 °F (36.9 °C)  Pulse:  [51-57] 55  Resp:  [16-18] 17  SpO2:  [94 %-99 %] 97 %  BP: (147-219)/() 163/70     Weight: 90.7 kg (200 lb)  Height: 6' 1" (185.4 cm)  Body mass index is 26.39 kg/m².    Physical Exam  Constitutional:       General: He is not in acute distress.     Appearance: He is obese.   Eyes:      General: No scleral icterus.     Extraocular Movements: Extraocular movements intact.      Pupils: Pupils are equal, round, and reactive to light.   Cardiovascular:      Rate and Rhythm: Normal rate and regular rhythm.      Heart sounds: No murmur heard.  Pulmonary:      Effort: Pulmonary effort is normal. No respiratory distress.   Abdominal:      General: There is no distension.      Palpations: Abdomen is soft.      Tenderness: There is no abdominal " "tenderness.   Musculoskeletal:      Right lower leg: No edema.      Left lower leg: No edema.   Skin:     Coloration: Skin is not jaundiced.   Neurological:      General: No focal deficit present.      Mental Status: He is alert.       Laboratory:  Labs within the past 24 hours have been reviewed.    Diagnostic Results:  None    Assessment/Plan:     * COVID-19 virus infection  Remdesivir per primary       CKD (chronic kidney disease) stage 3, GFR 30-59 ml/min  S/p DDKT 2016   Baseline sCr 1.4-1.6 since early 2021  Prior to 2021 was 0.9-1.1 and decreased likely secondary to DM and HTN not well controlled    Anemia of chronic disease  Mild no need for therapy at this time      Long-term use of immunosuppressant medication  At home on tacro 0.5/0.5, mmf 1000/1000, pred 5  Hold mmf for 1-2 weeks until symptoms resolved  Continue tacro and pred at home dose  Monitor for toxicities      S/P cadaveric kidney transplant 11/5/2016. ESRD secondary to HTN/DMII  11/2016  Now with stage III CKD  With secondary hyperpara not on medication  Recommend tight BP and glucose control  Continue immunosuppression as per problem "Long-term use of immunosuppressant medication"    Hyperparathyroidism due to renal insufficiency  Repeat pth 128  No need for new meds      Prophylactic immunotherapy  See "Long-term use of immunosuppressant medication"      Hypertension since 2000  -agree with current treatment            Osmin Owens Jr., MD  Kidney Transplant  [facility] - Intensive Care ([place] [id number])  "

## 2022-07-12 NOTE — PLAN OF CARE
Problem: Adult Inpatient Plan of Care  Goal: Plan of Care Review  Outcome: Ongoing, Progressing  Goal: Patient-Specific Goal (Individualized)  Outcome: Ongoing, Progressing  Goal: Absence of Hospital-Acquired Illness or Injury  Outcome: Ongoing, Progressing  Goal: Optimal Comfort and Wellbeing  Outcome: Ongoing, Progressing  Goal: Readiness for Transition of Care  Outcome: Ongoing, Progressing     Problem: Diabetes Comorbidity  Goal: Blood Glucose Level Within Targeted Range  Outcome: Ongoing, Progressing

## 2022-07-12 NOTE — PROGRESS NOTES
Pt discharged to home with all belongings and meds in stable condition. Discharge paper discussed with med scheduling. Pt had no further questions

## 2022-07-12 NOTE — ASSESSMENT & PLAN NOTE
At home on tacro 0.5/0.5, mmf 1000/1000, pred 5  Hold mmf for 1-2 weeks until symptoms resolved  Continue tacro and pred at home dose  Monitor for toxicities

## 2022-07-12 NOTE — ASSESSMENT & PLAN NOTE
"11/2016  Now with stage III CKD  With secondary hyperpara not on medication  Recommend tight BP and glucose control  Continue immunosuppression as per problem "Long-term use of immunosuppressant medication"  "

## 2022-07-12 NOTE — PLAN OF CARE
[facility] - Intensive Care ([facility]  Discharge Final Note    Primary Care Provider: Mima Mack MD    Expected Discharge Date: 7/12/2022    Pt to be d/c home with no needs.     Future Appointments   Date Time Provider Department Center   7/19/2022 11:00 AM Mckenna Jefferson PA-C [facility]         Final Discharge Note (most recent)       Final Note - 07/12/22 1242          Final Note    Assessment Type Final Discharge Note (P)      Anticipated Discharge Disposition Home or Self Care (P)         Post-Acute Status    Discharge Delays None known at this time (P)                    Paris Bell, AMADEO, MSW, LMSW, RSW   Case Management  [facility]  Email: [email or web address]    Important Message from Medicare             Contact Info       Mima Mack MD   Specialty: Internal Medicine   Relationship: PCP - General    [address]  [facility] [address]   Phone: [phone]       Next Steps: Follow up on 7/19/2022    Instructions: Nurse Practictioner Mckenna Jefferson is able to see pt for follow-up appointment on July 19, 2022 at 11 am

## 2022-07-12 NOTE — PROGRESS NOTES
/92 during 0400 vitals. While administering PRN IV hydralazine, /95. Patient attached to bedside monitor, BP to cycle q15min. After administration of PRN IV hydralazine, /93. Frequent monitoring maintained.

## 2022-07-12 NOTE — HOSPITAL COURSE
COVID-19 virus infection  - Treated with remdesivir while inpatient given COVID risk score of 6 on admission  - No dexamethasone admistered, given patient not hypoxic  - Patient's COVID symptoms improved with reduce cough and dyspnea  - Did not require some oxygen while a patient  - Discharged with current surveillance clinic monitoring and primary care follow-up     CKD (chronic kidney disease) stage 2, GFR 60-89 ml/min  S/p transplant in 2016 on immunosuppression. Baseline creatinine 1.5/1.6. Report good PO intake and medication adherence.  - Admission creatinine 1.9   - KTM consulted  - Tacro level daily  - Continue tacrolimus, prednisone   - To hold CellCept until COVID symptoms improve  - Discharge creatinine 1.4  - Follow-up requested with kidney Transplant Team

## 2022-07-13 ENCOUNTER — PATIENT OUTREACH (OUTPATIENT)
Dept: ADMINISTRATIVE | Facility: CLINIC | Age: 67
End: 2022-07-13
Payer: COMMERCIAL

## 2022-07-13 ENCOUNTER — TELEPHONE (OUTPATIENT)
Dept: ADMINISTRATIVE | Facility: CLINIC | Age: 67
End: 2022-07-13
Payer: COMMERCIAL

## 2022-07-13 NOTE — PROGRESS NOTES
C3 nurse spoke with Ravi Zamorano  for a TCC post hospital discharge follow up call. The patient has a scheduled HOSFU appointment with AMBAR Jefferson on 07/19/2022 @ 9700

## 2022-07-13 NOTE — PROGRESS NOTES
C3 nurse attempted to contact Ravi Zamorano  for a TCC post hospital discharge follow up call. No answer. Left voicemail with callback information. The patient has a scheduled HOSFU appointment with AMBAR Jefferson  on 07/19/2022 @ 1100.

## 2022-07-15 NOTE — DISCHARGE SUMMARY
[facility] - Intensive Care ([place] [address])  [facility] Medicine  Discharge Summary      Patient Name: Ravi Zamorano  MRN: [id number]  Patient Class: IP- Inpatient  Admission Date: 7/10/2022  Hospital Length of Stay: 2 days  Discharge Date and Time: 2022  2:19 PM  Attending Physician: No att. providers found   Discharging Provider: Nicola Arzate MD  Primary Care Provider: Mima Mack MD  [facility] Medicine Team: [facility] [place] [facility]  Nicola Arzate MD    HPI:   Mr. Zamorano is a 67 yo M with a h/o CKD status post  donor kidney transplant 2016, afib on AC, diabetes, and hypertension who presents with lightheadedness, productive cough with blood-streaked, DOSS, and weakness in the setting of positive COVID two days prior to admission.  Patient states symptoms started 4 days prior to admission we underwent COVID testing the following day. Symptoms continue to progress with worsening shortness of breath, productive cough, and unsteady gait resulting in patient presented to ED for further evaluation.  Patient denies myalgias, sore throat, nausea, abdominal pain, chest pain, headache, orthopnea, PND, bilateral lower extremity swelling, decrease oral intake, decrease urine output, dysuria, change in urine color, recent travel, and nonadherence to medications.    In the Ed, patient HDS. Labs notable for creatinine 1.9, potassium 3, BNP 3087, troponin 0.265, lactate 3.1.  Chest x-ray notable for possible trace left pleural effusion.  Patient given 1 L normal saline in the emergency room and potassium replacement.      * No surgery found *      Hospital Course:   COVID-19 virus infection  - Treated with remdesivir while inpatient given COVID risk score of 6 on admission  - No dexamethasone admistered, given patient not hypoxic  - Patient's COVID symptoms improved with reduce cough and dyspnea  - Did not require some oxygen while a patient  - Discharged with current surveillance clinic monitoring and  primary care follow-up     CKD (chronic kidney disease) stage 2, GFR 60-89 ml/min  S/p transplant in 2016 on immunosuppression. Baseline creatinine 1.5/1.6. Report good PO intake and medication adherence.  - Admission creatinine 1.9   - KTM consulted  - Tacro level daily  - Continue tacrolimus, prednisone   - To hold CellCept until COVID symptoms improve  - Discharge creatinine 1.4  - Follow-up requested with kidney Transplant Team       Goals of Care Treatment Preferences:  Code Status: Full Code      Consults:   Consults (From admission, onward)        Status Ordering Provider     Inpatient consult to Kidney/Pancreas Transplant Medicine  Once        Provider:  (Not yet assigned)    Completed REJI FALL     Inpatient consult to Registered Dietitian/Nutritionist  Once        Provider:  (Not yet assigned)    Completed REJI FALL          No new Assessment & Plan notes have been filed under this hospital service since the last note was generated.  Service: Hospital Medicine    Final Active Diagnoses:    Diagnosis Date Noted POA    PRINCIPAL PROBLEM:  COVID-19 virus infection [U07.1] 07/08/2022 Unknown    CKD (chronic kidney disease) stage 3, GFR 30-59 ml/min [N18.30] 12/28/2016 Yes    Type 2 diabetes mellitus with stage 2 chronic kidney disease, with long-term current use of insulin [E11.22, N18.2, Z79.4] 07/29/2014 Not Applicable    High serum lactate [R79.89] 07/10/2022 Unknown    Elevated troponin I level [R77.8] 07/10/2022 Unknown    Chronic diastolic congestive heart failure [I50.32] 10/24/2014 Yes    Hypokalemia [E87.6] 07/10/2022 Unknown    Persistent atrial fibrillation [I48.19] 08/09/2019 Yes    Hypertension since 2000 [I10] 07/29/2014 Yes    Long-term use of immunosuppressant medication [Z79.899] 11/07/2016 Not Applicable    Anemia of chronic disease [D63.8] 11/07/2016 Yes    S/P cadaveric kidney transplant 11/5/2016. ESRD secondary to HTN/DMII [Z94.0] 11/05/2016 Not Applicable     Hyperparathyroidism due to renal insufficiency [N25.81] 09/20/2016 Yes    Prophylactic immunotherapy [Z29.8] 09/16/2014 Not Applicable    Hyperlipidemia [E78.5] 07/29/2014 Yes    Diabetic polyneuropathy associated with type 2 diabetes mellitus [E11.42] 07/29/2014 Yes      Problems Resolved During this Admission:       Discharged Condition: good    Disposition: Home or Self Care    Follow Up:   Follow-up Information     Mima Mack MD Follow up on 7/19/2022.    Specialty: Internal Medicine  Why: Nurse Practictioner Mckenna Jefferson is able to see pt for follow-up appointment on July 19, 2022 at 11 am  Contact information:  [address]  [facility] [address]  [phone]                       Patient Instructions:      Ambulatory referral/consult to Cardiology   Standing Status: Future   Referral Priority: Routine Referral Type: Consultation   Referral Reason: Specialty Services Required   Requested Specialty: Cardiology     Ambulatory referral/consult to Internal Medicine   Standing Status: Future   Referral Priority: Routine Referral Type: Consultation   Referral Reason: Specialty Services Required   Requested Specialty: Internal Medicine   Number of Visits Requested: 1     Diet Cardiac     COVID-19 Surveillance Program     Order Specific Question Answer Comments   Does patient have a smartphone? Yes    Does patient have the [facility] on their smartphone? Yes    While in surveillance program, will patient be using home oxygen? No      Notify your health care provider if you experience any of the following:  increased confusion or weakness     Notify your health care provider if you experience any of the following:  persistent dizziness, light-headedness, or visual disturbances     Notify your health care provider if you experience any of the following:  worsening rash     Notify your health care provider if you experience any of the following:  severe persistent headache     Notify your health  "care provider if you experience any of the following:  difficulty breathing or increased cough     Notify your health care provider if you experience any of the following:  redness, tenderness, or signs of infection (pain, swelling, redness, odor or green/yellow discharge around incision site)     Notify your health care provider if you experience any of the following:  severe uncontrolled pain     Notify your health care provider if you experience any of the following:  persistent nausea and vomiting or diarrhea     Notify your health care provider if you experience any of the following:  temperature >100.4     Activity as tolerated       Significant Diagnostic Studies: Labs: All labs within the past 24 hours have been reviewed    Pending Diagnostic Studies:     None         Medications:  Reconciled Home Medications:      Medication List      START taking these medications    chlorthalidone 25 MG Tab  Commonly known as: HYGROTEN  Take 0.5 tablets (12.5 mg total) by mouth once daily.     pulse oximeter device  Commonly known as: pulse oximeter  by Apply Externally route 2 (two) times a day. Use twice daily at 8 AM and 3 PM and record the value in [facility] as directed.        CHANGE how you take these medications    hydrALAZINE 100 MG tablet  Commonly known as: APRESOLINE  Take 1 tablet (100 mg total) by mouth every 8 (eight) hours.  What changed:   · medication strength  · how much to take  · when to take this     predniSONE 5 MG tablet  Commonly known as: DELTASONE  Take 1 tablet (5 mg total) by mouth once daily.  What changed: Another medication with the same name was removed. Continue taking this medication, and follow the directions you see here.        CONTINUE taking these medications    aspirin 81 MG Chew  Take 81 mg by mouth once daily.     atorvastatin 40 MG tablet  Commonly known as: LIPITOR  Take 1 tablet (40 mg total) by mouth once daily.     BD ULTRA-FINE LO PEN NEEDLE 32 gauge x 5/32" Ndle  Generic " drug: pen needle, diabetic  USE TO ADMINISTER INSULIN 4 TIMES DAILY.     blood sugar diagnostic Strp  Commonly known as: ONETOUCH ULTRA TEST  USE 1 STRIP TO CHECK BLOOD GLUCOSE FOUR TIMES DAILY     blood-glucose meter kit  To check BG 4 times daily, to use with insurance preferred meter-one touch     carvediloL 25 MG tablet  Commonly known as: COREG  Take 1 tablet (25 mg total) by mouth 2 (two) times daily.     ELIQUIS 5 mg Tab  Generic drug: apixaban  Take 1 tablet (5 mg total) by mouth 2 (two) times daily.     famotidine 20 MG tablet  Commonly known as: PEPCID  Take 1 tablet (20 mg total) by mouth every evening.     flecainide 100 MG Tab  Commonly known as: TAMBOCOR  Take 1 tablet (100 mg total) by mouth every 12 (twelve) hours.     gabapentin 300 MG capsule  Commonly known as: NEURONTIN  Take 1 capsule by mouth in the morning, 1 capsule midday, and 3 capsules at night.     HumaLOG KwikPen Insulin 100 unit/mL pen  Generic drug: insulin lispro  Inject 18 units w/ breakfast, 16 units w/ lunch and dinner plus scale 150-200 +2, 201-250 +4, 251-300 +6, 301-350 +8.     lancets 33 gauge Misc  Commonly known as: ONETOUCH DELICA PLUS LANCET  USE TO CHECK BLOOD GLUCOSE FOUR TIMES DAILY     LANTUS SOLOSTAR U-100 INSULIN glargine 100 units/mL SubQ pen  Generic drug: insulin  Inject 35 units subcutaneously at night     meclizine 25 mg tablet  Commonly known as: ANTIVERT  Take 1 tablet (25 mg total) by mouth 3 (three) times daily as needed for Dizziness.     melatonin 5 mg  Commonly known as: MELATIN  Take 1 tablet (5 mg total) by mouth every evening.     mycophenolate 250 mg Cap  Commonly known as: CELLCEPT  Take 4 capsules (1,000 mg total) by mouth 2 (two) times daily.     OZEMPIC 0.25 mg or 0.5 mg(2 mg/1.5 mL) pen injector  Generic drug: semaglutide  Inject 0.5 mg weekly.     tacrolimus 0.5 MG Cap  Commonly known as: PROGRAF  Take 1 capsule (0.5 mg total) by mouth every 12 (twelve) hours.     valsartan 320 MG  tablet  Commonly known as: DIOVAN  Take 1 tablet (320 mg total) by mouth once daily.        STOP taking these medications    hydroCHLOROthiazide 12.5 MG Tab  Commonly known as: HYDRODIURIL            Indwelling Lines/Drains at time of discharge:   Lines/Drains/Airways     Drain  Duration                Closed/Suction Drain 11/05/16 2021 Right Abdomen Bulb 15 Fr. 2077 days          Arterial Line  Duration                Arterial Line 11/05/16 1910 Right 2077 days                Time spent on the discharge of patient: 35 minutes         Nicola Arzate MD  Department of Hospital Medicine  [facility] - Intensive Care ([place]-16)

## 2022-07-19 ENCOUNTER — LAB VISIT (OUTPATIENT)
Dept: LAB | Facility: HOSPITAL | Age: 67
End: 2022-07-19
Attending: INTERNAL MEDICINE
Payer: COMMERCIAL

## 2022-07-19 DIAGNOSIS — Z94.0 KIDNEY REPLACED BY TRANSPLANT: ICD-10-CM

## 2022-07-19 LAB
ALBUMIN SERPL BCP-MCNC: 2.6 G/DL (ref 3.5–5.2)
ANION GAP SERPL CALC-SCNC: 11 MMOL/L (ref 8–16)
BASOPHILS # BLD AUTO: 0.02 K/UL (ref 0–0.2)
BASOPHILS NFR BLD: 0.3 % (ref 0–1.9)
BUN SERPL-MCNC: 21 MG/DL (ref 8–23)
CALCIUM SERPL-MCNC: 9 MG/DL (ref 8.7–10.5)
CHLORIDE SERPL-SCNC: 101 MMOL/L (ref 95–110)
CO2 SERPL-SCNC: 26 MMOL/L (ref 23–29)
CREAT SERPL-MCNC: 1.6 MG/DL (ref 0.5–1.4)
DIFFERENTIAL METHOD: ABNORMAL
EOSINOPHIL # BLD AUTO: 0.2 K/UL (ref 0–0.5)
EOSINOPHIL NFR BLD: 2.7 % (ref 0–8)
ERYTHROCYTE [DISTWIDTH] IN BLOOD BY AUTOMATED COUNT: 15.6 % (ref 11.5–14.5)
EST. GFR  (AFRICAN AMERICAN): 51.9 ML/MIN/1.73 M^2
EST. GFR  (NON AFRICAN AMERICAN): 44.9 ML/MIN/1.73 M^2
GLUCOSE SERPL-MCNC: 167 MG/DL (ref 70–110)
HCT VFR BLD AUTO: 37.8 % (ref 40–54)
HGB BLD-MCNC: 11.2 G/DL (ref 14–18)
IMM GRANULOCYTES # BLD AUTO: 0.04 K/UL (ref 0–0.04)
IMM GRANULOCYTES NFR BLD AUTO: 0.7 % (ref 0–0.5)
LYMPHOCYTES # BLD AUTO: 0.8 K/UL (ref 1–4.8)
LYMPHOCYTES NFR BLD: 13.8 % (ref 18–48)
MAGNESIUM SERPL-MCNC: 1.8 MG/DL (ref 1.6–2.6)
MCH RBC QN AUTO: 24.4 PG (ref 27–31)
MCHC RBC AUTO-ENTMCNC: 29.6 G/DL (ref 32–36)
MCV RBC AUTO: 82 FL (ref 82–98)
MONOCYTES # BLD AUTO: 1 K/UL (ref 0.3–1)
MONOCYTES NFR BLD: 16.8 % (ref 4–15)
NEUTROPHILS # BLD AUTO: 3.9 K/UL (ref 1.8–7.7)
NEUTROPHILS NFR BLD: 65.7 % (ref 38–73)
NRBC BLD-RTO: 0 /100 WBC
PHOSPHATE SERPL-MCNC: 3.3 MG/DL (ref 2.7–4.5)
PLATELET # BLD AUTO: 162 K/UL (ref 150–450)
PMV BLD AUTO: 14.2 FL (ref 9.2–12.9)
POTASSIUM SERPL-SCNC: 3.7 MMOL/L (ref 3.5–5.1)
RBC # BLD AUTO: 4.59 M/UL (ref 4.6–6.2)
SODIUM SERPL-SCNC: 138 MMOL/L (ref 136–145)
TACROLIMUS BLD-MCNC: 5.7 NG/ML (ref 5–15)
WBC # BLD AUTO: 5.88 K/UL (ref 3.9–12.7)

## 2022-07-19 PROCEDURE — 80197 ASSAY OF TACROLIMUS: CPT | Performed by: INTERNAL MEDICINE

## 2022-07-19 PROCEDURE — 85025 COMPLETE CBC W/AUTO DIFF WBC: CPT | Performed by: INTERNAL MEDICINE

## 2022-07-19 PROCEDURE — 83735 ASSAY OF MAGNESIUM: CPT | Performed by: INTERNAL MEDICINE

## 2022-07-19 PROCEDURE — 36415 COLL VENOUS BLD VENIPUNCTURE: CPT | Performed by: INTERNAL MEDICINE

## 2022-07-19 PROCEDURE — 80069 RENAL FUNCTION PANEL: CPT | Performed by: INTERNAL MEDICINE

## 2022-07-19 RX ORDER — PEN NEEDLE, DIABETIC 30 GX3/16"
NEEDLE, DISPOSABLE MISCELLANEOUS
Qty: 400 EACH | Refills: 3 | Status: SHIPPED | OUTPATIENT
Start: 2022-07-19

## 2022-07-19 NOTE — PROGRESS NOTES
Cr at baseline.   Needs better sugar control.  Had COVID recently. Off MMF. Please remind him to resume MMF in 7 days.   Follow with general nephrology  Lab in 4 weeks.

## 2022-07-20 ENCOUNTER — TELEPHONE (OUTPATIENT)
Dept: TRANSPLANT | Facility: CLINIC | Age: 67
End: 2022-07-20
Payer: COMMERCIAL

## 2022-07-20 NOTE — TELEPHONE ENCOUNTER
----- Message from Radha Kennedy MD sent at 7/19/2022  8:48 AM CDT -----  Cr at baseline.   Needs better sugar control.  Had COVID recently. Off MMF. Please remind him to resume MMF in 7 days.   Follow with general nephrology  Lab in 4 weeks.

## 2022-07-25 DIAGNOSIS — Z94.0 KIDNEY REPLACED BY TRANSPLANT: Primary | ICD-10-CM

## 2022-07-26 DIAGNOSIS — I48.91 NEW ONSET A-FIB: ICD-10-CM

## 2022-07-26 DIAGNOSIS — R06.09 DOE (DYSPNEA ON EXERTION): ICD-10-CM

## 2022-07-26 DIAGNOSIS — E11.42 DIABETIC POLYNEUROPATHY ASSOCIATED WITH TYPE 2 DIABETES MELLITUS: ICD-10-CM

## 2022-07-26 RX ORDER — FLECAINIDE ACETATE 100 MG/1
100 TABLET ORAL EVERY 12 HOURS
Qty: 60 TABLET | Refills: 0 | Status: SHIPPED | OUTPATIENT
Start: 2022-07-26 | End: 2022-09-06 | Stop reason: SDUPTHER

## 2022-07-26 RX ORDER — GABAPENTIN 300 MG/1
CAPSULE ORAL
Qty: 450 CAPSULE | Refills: 3 | Status: SHIPPED | OUTPATIENT
Start: 2022-07-26 | End: 2023-08-04 | Stop reason: SDUPTHER

## 2022-07-26 NOTE — TELEPHONE ENCOUNTER
No new care gaps identified.  [facility] Embedded Care Gaps. Reference number: [id number]. 7/26/2022   9:11:02 AM AMADEOT

## 2022-07-27 ENCOUNTER — OFFICE VISIT (OUTPATIENT)
Dept: INTERNAL MEDICINE | Facility: CLINIC | Age: 67
End: 2022-07-27
Payer: COMMERCIAL

## 2022-07-27 VITALS
RESPIRATION RATE: 18 BRPM | WEIGHT: 205.94 LBS | SYSTOLIC BLOOD PRESSURE: 160 MMHG | BODY MASS INDEX: 27.29 KG/M2 | HEART RATE: 56 BPM | OXYGEN SATURATION: 98 % | HEIGHT: 73 IN | DIASTOLIC BLOOD PRESSURE: 66 MMHG

## 2022-07-27 DIAGNOSIS — E11.22 TYPE 2 DIABETES MELLITUS WITH STAGE 3A CHRONIC KIDNEY DISEASE, WITH LONG-TERM CURRENT USE OF INSULIN: ICD-10-CM

## 2022-07-27 DIAGNOSIS — Z79.899 ENCOUNTER FOR MONITORING FLECAINIDE THERAPY: ICD-10-CM

## 2022-07-27 DIAGNOSIS — Z29.89 PROPHYLACTIC IMMUNOTHERAPY: ICD-10-CM

## 2022-07-27 DIAGNOSIS — I15.0 RENOVASCULAR HYPERTENSION: ICD-10-CM

## 2022-07-27 DIAGNOSIS — Z51.81 ENCOUNTER FOR MONITORING FLECAINIDE THERAPY: ICD-10-CM

## 2022-07-27 DIAGNOSIS — Z79.01 ANTICOAGULANT LONG-TERM USE: ICD-10-CM

## 2022-07-27 DIAGNOSIS — Z94.0 S/P KIDNEY TRANSPLANT: ICD-10-CM

## 2022-07-27 DIAGNOSIS — N18.31 TYPE 2 DIABETES MELLITUS WITH STAGE 3A CHRONIC KIDNEY DISEASE, WITH LONG-TERM CURRENT USE OF INSULIN: ICD-10-CM

## 2022-07-27 DIAGNOSIS — N18.31 STAGE 3A CHRONIC KIDNEY DISEASE: ICD-10-CM

## 2022-07-27 DIAGNOSIS — U07.1 COVID-19 VIRUS INFECTION: Primary | ICD-10-CM

## 2022-07-27 DIAGNOSIS — I50.32 CHRONIC DIASTOLIC CONGESTIVE HEART FAILURE: ICD-10-CM

## 2022-07-27 DIAGNOSIS — Z79.4 TYPE 2 DIABETES MELLITUS WITH STAGE 3A CHRONIC KIDNEY DISEASE, WITH LONG-TERM CURRENT USE OF INSULIN: ICD-10-CM

## 2022-07-27 DIAGNOSIS — Z79.60 LONG-TERM USE OF IMMUNOSUPPRESSANT MEDICATION: ICD-10-CM

## 2022-07-27 PROBLEM — R79.89 ELEVATED TROPONIN I LEVEL: Status: RESOLVED | Noted: 2022-07-10 | Resolved: 2022-07-27

## 2022-07-27 PROBLEM — R10.13 EPIGASTRIC PAIN: Status: RESOLVED | Noted: 2019-03-16 | Resolved: 2022-07-27

## 2022-07-27 PROBLEM — R79.89 HIGH SERUM LACTATE: Status: RESOLVED | Noted: 2022-07-10 | Resolved: 2022-07-27

## 2022-07-27 PROCEDURE — 3008F PR BODY MASS INDEX (BMI) DOCUMENTED: ICD-10-PCS | Mod: CPTII,S$GLB,, | Performed by: INTERNAL MEDICINE

## 2022-07-27 PROCEDURE — 3066F PR DOCUMENTATION OF TREATMENT FOR NEPHROPATHY: ICD-10-PCS | Mod: CPTII,S$GLB,, | Performed by: INTERNAL MEDICINE

## 2022-07-27 PROCEDURE — 99499 RISK ADDL DX/OHS AUDIT: ICD-10-PCS | Mod: S$GLB,,, | Performed by: INTERNAL MEDICINE

## 2022-07-27 PROCEDURE — 1159F MED LIST DOCD IN RCRD: CPT | Mod: CPTII,S$GLB,, | Performed by: INTERNAL MEDICINE

## 2022-07-27 PROCEDURE — 4010F PR ACE/ARB THEARPY RXD/TAKEN: ICD-10-PCS | Mod: CPTII,S$GLB,, | Performed by: INTERNAL MEDICINE

## 2022-07-27 PROCEDURE — 3008F BODY MASS INDEX DOCD: CPT | Mod: CPTII,S$GLB,, | Performed by: INTERNAL MEDICINE

## 2022-07-27 PROCEDURE — 3066F NEPHROPATHY DOC TX: CPT | Mod: CPTII,S$GLB,, | Performed by: INTERNAL MEDICINE

## 2022-07-27 PROCEDURE — 99214 PR OFFICE/OUTPT VISIT, EST, LEVL IV, 30-39 MIN: ICD-10-PCS | Mod: S$GLB,,, | Performed by: INTERNAL MEDICINE

## 2022-07-27 PROCEDURE — 3077F SYST BP >= 140 MM HG: CPT | Mod: CPTII,S$GLB,, | Performed by: INTERNAL MEDICINE

## 2022-07-27 PROCEDURE — 99999 PR PBB SHADOW E&M-EST. PATIENT-LVL V: CPT | Mod: PBBFAC,,, | Performed by: INTERNAL MEDICINE

## 2022-07-27 PROCEDURE — 99214 OFFICE O/P EST MOD 30 MIN: CPT | Mod: S$GLB,,, | Performed by: INTERNAL MEDICINE

## 2022-07-27 PROCEDURE — 3078F DIAST BP <80 MM HG: CPT | Mod: CPTII,S$GLB,, | Performed by: INTERNAL MEDICINE

## 2022-07-27 PROCEDURE — 99499 UNLISTED E&M SERVICE: CPT | Mod: S$GLB,,, | Performed by: INTERNAL MEDICINE

## 2022-07-27 PROCEDURE — 3078F PR MOST RECENT DIASTOLIC BLOOD PRESSURE < 80 MM HG: ICD-10-PCS | Mod: CPTII,S$GLB,, | Performed by: INTERNAL MEDICINE

## 2022-07-27 PROCEDURE — 99999 PR PBB SHADOW E&M-EST. PATIENT-LVL V: ICD-10-PCS | Mod: PBBFAC,,, | Performed by: INTERNAL MEDICINE

## 2022-07-27 PROCEDURE — 4010F ACE/ARB THERAPY RXD/TAKEN: CPT | Mod: CPTII,S$GLB,, | Performed by: INTERNAL MEDICINE

## 2022-07-27 PROCEDURE — 3051F HG A1C>EQUAL 7.0%<8.0%: CPT | Mod: CPTII,S$GLB,, | Performed by: INTERNAL MEDICINE

## 2022-07-27 PROCEDURE — 3051F PR MOST RECENT HEMOGLOBIN A1C LEVEL 7.0 - < 8.0%: ICD-10-PCS | Mod: CPTII,S$GLB,, | Performed by: INTERNAL MEDICINE

## 2022-07-27 PROCEDURE — 3077F PR MOST RECENT SYSTOLIC BLOOD PRESSURE >= 140 MM HG: ICD-10-PCS | Mod: CPTII,S$GLB,, | Performed by: INTERNAL MEDICINE

## 2022-07-27 PROCEDURE — 1159F PR MEDICATION LIST DOCUMENTED IN MEDICAL RECORD: ICD-10-PCS | Mod: CPTII,S$GLB,, | Performed by: INTERNAL MEDICINE

## 2022-07-27 RX ORDER — CHLORTHALIDONE 25 MG/1
25 TABLET ORAL DAILY
Qty: 90 TABLET | Refills: 1 | Status: SHIPPED | OUTPATIENT
Start: 2022-07-27 | End: 2022-08-25

## 2022-07-27 RX ORDER — SPIRONOLACTONE 25 MG/1
25 TABLET ORAL DAILY
Qty: 90 TABLET | Refills: 1 | Status: SHIPPED | OUTPATIENT
Start: 2022-07-27 | End: 2023-02-01 | Stop reason: SDUPTHER

## 2022-07-27 NOTE — PROGRESS NOTES
[facility]  Progress Note   Recent Hospital Discharge     PRESENTING HISTORY     Chief Complaint/Reason for Visit:  Follow up Hospital Discharge     PCP: Mima Mack MD    History of Present Illness: Mr. Ravi Zamorano is a 66 y.o. male who was recently admitted to the hospital.    Patient Class: IP- Inpatient  Admission Date: 7/10/2022  Hospital Length of Stay: 2 days  Discharge Date and Time: 2022  2:19 PM  Attending Physician: No att. providers found   Discharging Provider: Nicola Arzate MD  Primary Care Provider: Mima Mack MD  [facility] Medicine Team: [facility]  Nicola Arzate MD     HPI:   Mr. Zamorano is a 67 yo M with a h/o CKD status post  donor kidney transplant 2016, afib on AC, diabetes, and hypertension who presents with lightheadedness, productive cough with blood-streaked, DOSS, and weakness in the setting of positive COVID two days prior to admission.  Patient states symptoms started 4 days prior to admission we underwent COVID testing the following day. Symptoms continue to progress with worsening shortness of breath, productive cough, and unsteady gait resulting in patient presented to ED for further evaluation.  Patient denies myalgias, sore throat, nausea, abdominal pain, chest pain, headache, orthopnea, PND, bilateral lower extremity swelling, decrease oral intake, decrease urine output, dysuria, change in urine color, recent travel, and nonadherence to medications.     In the Ed, patient HDS. Labs notable for creatinine 1.9, potassium 3, BNP 3087, troponin 0.265, lactate 3.1.  Chest x-ray notable for possible trace left pleural effusion.  Patient given 1 L normal saline in the emergency room and potassium replacement.     Hospital Course:   COVID-19 virus infection  - Treated with remdesivir while inpatient given COVID risk score of 6 on admission  - No dexamethasone admistered, given patient not hypoxic  - Patient's COVID symptoms  improved with reduce cough and dyspnea  - Did not require some oxygen while a patient  - Discharged with current surveillance clinic monitoring and primary care follow-up     CKD (chronic kidney disease) stage 2, GFR 60-89 ml/min  S/p transplant in 2016 on immunosuppression. Baseline creatinine 1.5/1.6. Report good PO intake and medication adherence.  - Admission creatinine 1.9   - KTM consulted  - Tacro level daily  - Continue tacrolimus, prednisone   - To hold CellCept until COVID symptoms improve  - Discharge creatinine 1.4  - Follow-up requested with kidney Transplant Team      _________________________________________________________________    Today:    Negative COVID at home last week.   No coughing No running nose.    Blood sugar 130.  On Humalog 18, 16, 16  Lantus 35    PAST HISTORY:     Past Medical History:   Diagnosis Date    Anxiety 7/29/2014    Arthritis     Bilateral diabetic retinopathy 2017    CKD (chronic kidney disease) stage 2, GFR 60-89 ml/min 12/28/2016    CKD (chronic kidney disease) stage 4, GFR 15-29 ml/min 7/29/2014    Colon polyps 2014    Depression - situational 7/29/2014    Diabetes mellitus     Diabetes type 2 since 2000 7/29/2014    Diabetic neuropathy 7/29/2014    History of cardioversion 10/3/2019    History of hepatitis C, s/p successful Rx w/ SVR24 - 9/2017 7/29/2014    Genotype 1a, treatment naive 10/2014 liver biopsy - grade 1 / stage 1 Completed Harvoni w/ SVR    Hyperlipidemia 7/29/2014    Hypertension     Neuropathy     Organ transplant candidate 7/29/2014    Pancreatitis     S/P cadaveric kidney transplant 11/5/2016. ESRD secondary to HTN/DMII 11/5/2016    Stage 3a chronic kidney disease 12/28/2016    Type 2 diabetes mellitus with stage 3a chronic kidney disease, with long-term current use of insulin 7/29/2014       Past Surgical History:   Procedure Laterality Date    APPENDECTOMY      COLONOSCOPY N/A 12/21/2020    Procedure: COLONOSCOPY;  Surgeon: Tico  KIMMIE Bell MD;  Location: [facility] ENDO (4TH FLR);  Service: Endoscopy;  Laterality: N/A;  ok to hold eliquis per Dr. Valadez, see telephone encounter 2020-[place]  covid test  [place]    KIDNEY TRANSPLANT      TRANSESOPHAGEAL ECHOCARDIOGRAPHY N/A 2019    Procedure: ECHOCARDIOGRAM, TRANSESOPHAGEAL;  Surgeon: Dolores Diagnostic Provider;  Location: [facility] EP LAB;  Service: Cardiology;  Laterality: N/A;    TREATMENT OF CARDIAC ARRHYTHMIA N/A 2019    Procedure: CARDIOVERSION;  Surgeon: Bronson Bowden MD;  Location: [facility] EP LAB;  Service: Cardiology;  Laterality: N/A;  AF, FELICIANO, DCCV, MAC, GP, 3 PREP       Family History   Problem Relation Age of Onset    Diabetes Mother     Hypertension Mother     Heart disease Mother         CAD with PCI    Heart disease Father     Cancer Brother         one sister with breast cancer    Hypertension Brother         one sister with HTN and borderline DM    Stroke Neg Hx     Kidney disease Neg Hx        Social History     Socioeconomic History    Marital status:    Occupational History     [employer]er: [employer]   Tobacco Use    Smoking status: Former Smoker     Packs/day: 0.50     Years: 32.00     Pack years: 16.00     Types: Cigarettes     Quit date: 2016     Years since quittin.6    Smokeless tobacco: Never Used    Tobacco comment: Pt reports that he quit in , but started up again in . pt reports he is currently working on quitting again   Substance and Sexual Activity    Alcohol use: Yes     Comment: Pt reports occasional beers on Sundays. Pt reports drinking daily prior to ESRD diagnosis.    Drug use: No    Sexual activity: Yes     Partners: Female   Social History Narrative     for 14 years     for 29 years    2 children ages 35, 36       MEDICATIONS & ALLERGIES:     Current Outpatient Medications on File Prior to Visit   Medication Sig Dispense Refill    apixaban (ELIQUIS) 5 mg Tab Take 1 tablet (5 mg  "total) by mouth 2 (two) times daily. 60 tablet 11    aspirin 81 MG Chew Take 81 mg by mouth once daily.      atorvastatin (LIPITOR) 40 MG tablet Take 1 tablet (40 mg total) by mouth once daily. 90 tablet 3    blood sugar diagnostic (ONETOUCH ULTRA TEST) Strp USE 1 STRIP TO CHECK BLOOD GLUCOSE FOUR TIMES DAILY 400 strip 3    carvediloL (COREG) 25 MG tablet Take 1 tablet (25 mg total) by mouth 2 (two) times daily. 180 tablet 3    famotidine (PEPCID) 20 MG tablet Take 1 tablet (20 mg total) by mouth every evening. 90 tablet 3    flecainide (TAMBOCOR) 100 MG Tab Take 1 tablet (100 mg total) by mouth every 12 (twelve) hours. 60 tablet 0    gabapentin (NEURONTIN) 300 MG capsule Take 1 capsule by mouth in the morning, 1 capsule midday, and 3 capsules at night. 450 capsule 3    hydrALAZINE (APRESOLINE) 100 MG tablet Take 1 tablet (100 mg total) by mouth every 8 (eight) hours. 90 tablet 11    insulin (LANTUS SOLOSTAR U-100 INSULIN) glargine 100 units/mL (3mL) SubQ pen Inject 35 units subcutaneously at night (Patient taking differently: Inject 35 units subcutaneously at night) 12 mL 6    insulin lispro (HUMALOG KWIKPEN INSULIN) 100 unit/mL pen Inject 18 units w/ breakfast, 16 units w/ lunch and dinner plus scale 150-200 +2, 201-250 +4, 251-300 +6, 301-350 +8. 30 mL 4    lancets (ONETOUCH DELICA PLUS LANCET) 33 gauge Misc USE TO CHECK BLOOD GLUCOSE FOUR TIMES DAILY 400 each 3    meclizine (ANTIVERT) 25 mg tablet Take 1 tablet (25 mg total) by mouth 3 (three) times daily as needed for Dizziness. 21 tablet 0    melatonin 5 mg Tab Take 1 tablet (5 mg total) by mouth every evening.      mycophenolate (CELLCEPT) 250 mg Cap Take 4 capsules (1,000 mg total) by mouth 2 (two) times daily. 240 capsule 11    pen needle, diabetic (BD ULTRA-FINE LO PEN NEEDLE) 32 gauge x 5/32" Ndle USE TO ADMINISTER INSULIN 4 TIMES DAILY. 400 each 3    predniSONE (DELTASONE) 5 MG tablet Take 1 tablet (5 mg total) by mouth once daily. 30 " tablet 11    semaglutide (OZEMPIC) 0.25 mg or 0.5 mg(2 mg/1.5 mL) pen injector Inject 0.5 mg weekly. 3 pen 3    tacrolimus (PROGRAF) 0.5 MG Cap Take 1 capsule (0.5 mg total) by mouth every 12 (twelve) hours. 60 capsule 11    valsartan (DIOVAN) 320 MG tablet Take 1 tablet (320 mg total) by mouth once daily. 90 tablet 3    [DISCONTINUED] chlorthalidone (HYGROTEN) 25 MG Tab Take 0.5 tablets (12.5 mg total) by mouth once daily. 15 tablet 11     Review of patient's allergies indicates:   Allergen Reactions    Nifedipine Other (See Comments)     Gingival hyperplasia       OBJECTIVE:     Vital Signs:  Vitals:    07/27/22 1020   BP: (!) 160/66   Pulse: (!) 56   Resp: 18     Wt Readings from Last 3 Encounters:   07/11/22 1535 90.7 kg (200 lb)   07/10/22 1052 90.7 kg (200 lb)   07/08/22 1147 90.7 kg (200 lb)   01/07/22 1131 91.1 kg (200 lb 13.4 oz)     Body mass index is 27.17 kg/m².     Physical Exam:  General: Well developed, well nourished. No distress.  HEENT: Head is normocephalic, atraumatic   Eyes: Clear conjunctiva.  Neck: Supple, symmetrical neck; trachea midline.  Lungs: Clear to auscultation bilaterally and normal respiratory effort.  Cardiovascular: Heart with regular rate and rhythm.    Extremities: Trace LE edema bilaterally.    Abdomen: Abdomen is soft, non-tender non-distended with normal bowel sounds.  Skin: Skin color, texture, turgor normal. No rashes.  Musculoskeletal: Normal gait.   Psychiatric: Normal affect. Alert.    Laboratory  Lab Results   Component Value Date    WBC 5.88 07/19/2022    HGB 11.2 (L) 07/19/2022    HCT 37.8 (L) 07/19/2022    MCV 82 07/19/2022     07/19/2022     BMP  Lab Results   Component Value Date     07/19/2022    K 3.7 07/19/2022     07/19/2022    CO2 26 07/19/2022    BUN 21 07/19/2022    CREATININE 1.6 (H) 07/19/2022    CALCIUM 9.0 07/19/2022    ANIONGAP 11 07/19/2022    ESTGFRAFRICA 51.9 (A) 07/19/2022    EGFRNONAA 44.9 (A) 07/19/2022     Lab Results    Component Value Date    ALT 6 (L) 07/12/2022    AST 14 07/12/2022    ALKPHOS 47 (L) 07/12/2022    BILITOT 0.4 07/12/2022     Lab Results   Component Value Date    INR 1.1 08/19/2019    INR 1.0 03/15/2019    INR 0.9 11/05/2016     Lab Results   Component Value Date    HGBA1C 7.8 (H) 07/11/2022      Latest Reference Range & Units 08/02/21 10:55 07/08/22 12:04   SARS-CoV-2 RNA, Amplification, Qual Negative  Positive ! Positive !   !: Data is abnormal  TRANSITION OF CARE:     Diagnostic tests reviewed/disposition: No diagnosic tests pending after this hospitalization.  Disease/illness education: COVID, HTN  Home health/community services discussion/referrals: Patient does not have home health established from hospital visit.  They do not need home health.  If needed, we will set up home health for the patient.   Establishment or re-establishment of referral orders for community resources: No other necessary community resources.   Discussion with other health care providers: No discussion with other health care providers necessary.     Transition of Care Visit:     I have reviewed and updated the history and problem list.  I have reconciled the medication list.  I have discussed the hospitalization and current medical issues, prognosis and plans with the patient/family.  I  spent more than 50% of time discussing the care with the patient/family.     Medications Reconciliation:   I have reconciled the patient's home medications and discharge medications with the patient/family. I have updated all changes.  Refer to After-Visit Medication List.    ASSESSMENT & PLAN:     COVID-19 virus infection  - Resolved. Doing well without COVID symptoms.      S/P cadaveric kidney transplant 11/5/2016. ESRD secondary to HTN/DMII  Long-term use of immunosuppressant medication  Prophylactic immunotherapy  Stage 3a chronic kidney disease  - Renal function stable.     Type 2 diabetes mellitus with stage 3a chronic kidney disease, with  long-term current use of insulin  - On insulin.    Renovascular hypertension  Chronic diastolic congestive heart failure  - BP high on;                             Coreg 25 mg BID                             Hydralazine 100 mg TID                             Valsartan 320 mg daily                             Chlorthalidone 12.5 mg daily    Plan:  -   Increase:  chlorthalidone (HYGROTEN) 25 MG Tab; Take 1 tablet (25 mg total) by mouth once daily.  Dispense: 90 tablet; Refill: 1  -  Add:   spironolactone (ALDACTONE) 25 MG tablet; Take 1 tablet (25 mg total) by mouth once daily.  Dispense: 90 tablet; Refill: 1    Anticoagulant long-term use  Encounter for monitoring flecainide therapy  - On Flecainide and Apixaban.    Scheduled Follow-up :  Future Appointments   Date Time Provider Department Center   8/25/2022  3:00 PM Fanny Willis MD [facility] NEPHRO [facility]   10/27/2022  9:30 AM Mima Mack MD [facility] IM [facility]       After Visit Medication List :     Medication List          Accurate as of July 27, 2022 10:48 AM. If you have any questions, ask your nurse or doctor.            START taking these medications    spironolactone 25 MG tablet  Commonly known as: ALDACTONE  Take 1 tablet (25 mg total) by mouth once daily.  Started by: Devin Dooley MD        CHANGE how you take these medications    chlorthalidone 25 MG Tab  Commonly known as: HYGROTEN  Take 1 tablet (25 mg total) by mouth once daily.  What changed: how much to take  Changed by: Devin Dooley MD        CONTINUE taking these medications    apixaban 5 mg Tab  Commonly known as: ELIQUIS  Take 1 tablet (5 mg total) by mouth 2 (two) times daily.     aspirin 81 MG Chew     atorvastatin 40 MG tablet  Commonly known as: LIPITOR  Take 1 tablet (40 mg total) by mouth once daily.     blood sugar diagnostic Strp  Commonly known as: ONETOUCH ULTRA TEST  USE 1 STRIP TO CHECK BLOOD GLUCOSE FOUR TIMES DAILY     carvediloL 25 MG tablet  Commonly known as: COREG  Take 1  "tablet (25 mg total) by mouth 2 (two) times daily.     famotidine 20 MG tablet  Commonly known as: PEPCID  Take 1 tablet (20 mg total) by mouth every evening.     flecainide 100 MG Tab  Commonly known as: TAMBOCOR  Take 1 tablet (100 mg total) by mouth every 12 (twelve) hours.     gabapentin 300 MG capsule  Commonly known as: NEURONTIN  Take 1 capsule by mouth in the morning, 1 capsule midday, and 3 capsules at night.     HumaLOG KwikPen Insulin 100 unit/mL pen  Generic drug: insulin lispro  Inject 18 units w/ breakfast, 16 units w/ lunch and dinner plus scale 150-200 +2, 201-250 +4, 251-300 +6, 301-350 +8.     hydrALAZINE 100 MG tablet  Commonly known as: APRESOLINE  Take 1 tablet (100 mg total) by mouth every 8 (eight) hours.     lancets 33 gauge Misc  Commonly known as: ONETOUCH DELICA PLUS LANCET  USE TO CHECK BLOOD GLUCOSE FOUR TIMES DAILY     LANTUS SOLOSTAR U-100 INSULIN glargine 100 units/mL SubQ pen  Generic drug: insulin  Inject 35 units subcutaneously at night     meclizine 25 mg tablet  Commonly known as: ANTIVERT  Take 1 tablet (25 mg total) by mouth 3 (three) times daily as needed for Dizziness.     melatonin 5 mg  Commonly known as: MELATIN  Take 1 tablet (5 mg total) by mouth every evening.     mycophenolate 250 mg Cap  Commonly known as: CELLCEPT  Take 4 capsules (1,000 mg total) by mouth 2 (two) times daily.     OZEMPIC 0.25 mg or 0.5 mg(2 mg/1.5 mL) pen injector  Generic drug: semaglutide  Inject 0.5 mg weekly.     pen needle, diabetic 32 gauge x 5/32" Ndle  Commonly known as: BD ULTRA-FINE LO PEN NEEDLE  USE TO ADMINISTER INSULIN 4 TIMES DAILY.     predniSONE 5 MG tablet  Commonly known as: DELTASONE  Take 1 tablet (5 mg total) by mouth once daily.     tacrolimus 0.5 MG Cap  Commonly known as: PROGRAF  Take 1 capsule (0.5 mg total) by mouth every 12 (twelve) hours.     valsartan 320 MG tablet  Commonly known as: DIOVAN  Take 1 tablet (320 mg total) by mouth once daily.        STOP taking these " medications    blood-glucose meter kit  Stopped by: Devin Dooley MD     pulse oximeter device  Commonly known as: pulse oximeter  Stopped by: Devin Dooley MD           Where to Get Your Medications      These medications were sent to [facility]  [address] [place], [place] [address] [address]    Hours: Mon-Fri 7a-7p, Sat-Sun 10a-4p Phone: [phone]   · chlorthalidone 25 MG Tab  · spironolactone 25 MG tablet           Signing Physician:  Devin Dooley MD

## 2022-08-05 ENCOUNTER — TELEPHONE (OUTPATIENT)
Dept: TRANSPLANT | Facility: CLINIC | Age: 67
End: 2022-08-05
Payer: COMMERCIAL

## 2022-08-05 ENCOUNTER — TELEPHONE (OUTPATIENT)
Dept: INTERNAL MEDICINE | Facility: CLINIC | Age: 67
End: 2022-08-05
Payer: COMMERCIAL

## 2022-08-05 NOTE — TELEPHONE ENCOUNTER
Patient referred by Dr. Mack for Health coaching [facility] HTN pgm(HTN, DM, lifestyle changes).  Called patient and gave an explanation about Health Coaching program and invited participation.   Patient states he would like to decline at this time but will take HC phone number and think about considering participating. He states his last bp this am was 187/87 he is taking twice a day am and pm. States he knows is high and has tried for years to get it down.   Gave direct contact number to call if he has any questions or changes mind to make appt.  [phone].   Latisha Ag RN  Health

## 2022-08-05 NOTE — TELEPHONE ENCOUNTER
Called patient to confirm he restarted  Cellcept after testing Covid+. Patient stated he starting taking the Cellcept on 7/29/22.

## 2022-08-24 ENCOUNTER — LAB VISIT (OUTPATIENT)
Dept: LAB | Facility: HOSPITAL | Age: 67
End: 2022-08-24
Attending: INTERNAL MEDICINE
Payer: COMMERCIAL

## 2022-08-24 ENCOUNTER — TELEPHONE (OUTPATIENT)
Dept: TRANSPLANT | Facility: CLINIC | Age: 67
End: 2022-08-24
Payer: COMMERCIAL

## 2022-08-24 DIAGNOSIS — Z94.0 KIDNEY REPLACED BY TRANSPLANT: ICD-10-CM

## 2022-08-24 LAB
ALBUMIN SERPL BCP-MCNC: 3.4 G/DL (ref 3.5–5.2)
ANION GAP SERPL CALC-SCNC: 9 MMOL/L (ref 8–16)
BASOPHILS # BLD AUTO: 0.04 K/UL (ref 0–0.2)
BASOPHILS NFR BLD: 0.9 % (ref 0–1.9)
BUN SERPL-MCNC: 23 MG/DL (ref 8–23)
CALCIUM SERPL-MCNC: 9.7 MG/DL (ref 8.7–10.5)
CHLORIDE SERPL-SCNC: 103 MMOL/L (ref 95–110)
CO2 SERPL-SCNC: 28 MMOL/L (ref 23–29)
CREAT SERPL-MCNC: 1.7 MG/DL (ref 0.5–1.4)
DIFFERENTIAL METHOD: ABNORMAL
EOSINOPHIL # BLD AUTO: 0.1 K/UL (ref 0–0.5)
EOSINOPHIL NFR BLD: 1.4 % (ref 0–8)
ERYTHROCYTE [DISTWIDTH] IN BLOOD BY AUTOMATED COUNT: 16.8 % (ref 11.5–14.5)
EST. GFR  (NO RACE VARIABLE): 44.5 ML/MIN/1.73 M^2
GLUCOSE SERPL-MCNC: 203 MG/DL (ref 70–110)
HCT VFR BLD AUTO: 38.5 % (ref 40–54)
HGB BLD-MCNC: 11.9 G/DL (ref 14–18)
IMM GRANULOCYTES # BLD AUTO: 0.01 K/UL (ref 0–0.04)
IMM GRANULOCYTES NFR BLD AUTO: 0.2 % (ref 0–0.5)
LYMPHOCYTES # BLD AUTO: 1 K/UL (ref 1–4.8)
LYMPHOCYTES NFR BLD: 23.3 % (ref 18–48)
MAGNESIUM SERPL-MCNC: 1.7 MG/DL (ref 1.6–2.6)
MCH RBC QN AUTO: 24.5 PG (ref 27–31)
MCHC RBC AUTO-ENTMCNC: 30.9 G/DL (ref 32–36)
MCV RBC AUTO: 79 FL (ref 82–98)
MONOCYTES # BLD AUTO: 1.1 K/UL (ref 0.3–1)
MONOCYTES NFR BLD: 26 % (ref 4–15)
NEUTROPHILS # BLD AUTO: 2.1 K/UL (ref 1.8–7.7)
NEUTROPHILS NFR BLD: 48.2 % (ref 38–73)
NRBC BLD-RTO: 0 /100 WBC
PHOSPHATE SERPL-MCNC: 3.3 MG/DL (ref 2.7–4.5)
PLATELET # BLD AUTO: 128 K/UL (ref 150–450)
PMV BLD AUTO: ABNORMAL FL (ref 9.2–12.9)
POTASSIUM SERPL-SCNC: 4.2 MMOL/L (ref 3.5–5.1)
RBC # BLD AUTO: 4.86 M/UL (ref 4.6–6.2)
SODIUM SERPL-SCNC: 140 MMOL/L (ref 136–145)
TACROLIMUS BLD-MCNC: 5 NG/ML (ref 5–15)
WBC # BLD AUTO: 4.38 K/UL (ref 3.9–12.7)

## 2022-08-24 PROCEDURE — 80197 ASSAY OF TACROLIMUS: CPT | Performed by: INTERNAL MEDICINE

## 2022-08-24 PROCEDURE — 80069 RENAL FUNCTION PANEL: CPT | Performed by: INTERNAL MEDICINE

## 2022-08-24 PROCEDURE — 83735 ASSAY OF MAGNESIUM: CPT | Performed by: INTERNAL MEDICINE

## 2022-08-24 PROCEDURE — 85025 COMPLETE CBC W/AUTO DIFF WBC: CPT | Performed by: INTERNAL MEDICINE

## 2022-08-24 PROCEDURE — 36415 COLL VENOUS BLD VENIPUNCTURE: CPT | Performed by: INTERNAL MEDICINE

## 2022-08-24 NOTE — TELEPHONE ENCOUNTER
----- Message from Bola Sosa MD sent at 8/24/2022 10:05 AM CDT -----  Labs and diagnostic tests were reviewed. No action/changes indicated.

## 2022-08-25 ENCOUNTER — OFFICE VISIT (OUTPATIENT)
Dept: NEPHROLOGY | Facility: CLINIC | Age: 67
End: 2022-08-25
Payer: COMMERCIAL

## 2022-08-25 VITALS
HEIGHT: 73 IN | BODY MASS INDEX: 26.44 KG/M2 | HEART RATE: 56 BPM | OXYGEN SATURATION: 98 % | DIASTOLIC BLOOD PRESSURE: 70 MMHG | WEIGHT: 199.5 LBS | SYSTOLIC BLOOD PRESSURE: 160 MMHG

## 2022-08-25 DIAGNOSIS — I15.0 RENOVASCULAR HYPERTENSION: ICD-10-CM

## 2022-08-25 DIAGNOSIS — Z94.0 S/P KIDNEY TRANSPLANT: ICD-10-CM

## 2022-08-25 DIAGNOSIS — N18.31 STAGE 3A CHRONIC KIDNEY DISEASE: Primary | ICD-10-CM

## 2022-08-25 DIAGNOSIS — E11.3393 TYPE 2 DIABETES MELLITUS WITH BOTH EYES AFFECTED BY MODERATE NONPROLIFERATIVE RETINOPATHY WITHOUT MACULAR EDEMA, WITHOUT LONG-TERM CURRENT USE OF INSULIN: ICD-10-CM

## 2022-08-25 DIAGNOSIS — I48.19 PERSISTENT ATRIAL FIBRILLATION: ICD-10-CM

## 2022-08-25 DIAGNOSIS — N18.2 CKD (CHRONIC KIDNEY DISEASE) STAGE 2, GFR 60-89 ML/MIN: Primary | ICD-10-CM

## 2022-08-25 PROCEDURE — 99999 PR PBB SHADOW E&M-EST. PATIENT-LVL V: CPT | Mod: PBBFAC,,, | Performed by: STUDENT IN AN ORGANIZED HEALTH CARE EDUCATION/TRAINING PROGRAM

## 2022-08-25 PROCEDURE — 99999 PR PBB SHADOW E&M-EST. PATIENT-LVL V: ICD-10-PCS | Mod: PBBFAC,,, | Performed by: STUDENT IN AN ORGANIZED HEALTH CARE EDUCATION/TRAINING PROGRAM

## 2022-08-25 PROCEDURE — 99499 UNLISTED E&M SERVICE: CPT | Mod: S$GLB,,, | Performed by: STUDENT IN AN ORGANIZED HEALTH CARE EDUCATION/TRAINING PROGRAM

## 2022-08-25 PROCEDURE — 99499 RISK ADDL DX/OHS AUDIT: ICD-10-PCS | Mod: S$GLB,,, | Performed by: STUDENT IN AN ORGANIZED HEALTH CARE EDUCATION/TRAINING PROGRAM

## 2022-08-25 RX ORDER — CHLORTHALIDONE 25 MG/1
50 TABLET ORAL DAILY
Qty: 90 TABLET | Refills: 3 | Status: SHIPPED | OUTPATIENT
Start: 2022-08-25 | End: 2023-03-22 | Stop reason: SDUPTHER

## 2022-08-25 NOTE — PROGRESS NOTES
Nephrology Clinic Note   2022    Chief Complaint   Patient presents with    Chronic Kidney Disease      History of present illness:  Patient is a 67 y.o. 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     Presents to the clinic today for f/u on CKD  Pt has been taking his transplant medications however he has not been taking his diabetic medication, occasionally takes his blood pressure medication.     Kidney transplant ()  Donor Type:   - Brain Death  Donor CMV Status:  Positive  Donor HBcAB:  Negative  Donor HCV Status:  Positive  Transplant nephrologist: Dr. Sosa   Transplant medications: Tacrolimus 0.5mg twice daily, Prednisone 5mg daily, and Mycophenolate 250mg 4 caps twice daily     CKD   Post-kidney transplant CKD   Meds:  valsartan 320mg daily    DM II   Pt has not been taking semaglutide >6 months   He has severe neuropathy   Meds: Humalog insulin    HTN/afib/CAD   pt measures blood pressure, 190/90  Meds: eliquis 5mg daily, asa 81mg daily, atorvastatin 40mg daily, carvedilol 25mg bid, spironolactone, valsartan 320mg daily , hydralazine 100mg every 8 hours      Pt denies: Fever, chills, shortness of breath, chest pain, palpitations, nausea, vomiting, diarrhea, abd pain, hematuria, dysuria, urinary frequency or urgency, headaches, visual disturbances or weakness.      ROS as per HPI    History:  Past Medical History:   Diagnosis Date    Anxiety 2014    Arthritis     Bilateral diabetic retinopathy     CKD (chronic kidney disease) stage 2, GFR 60-89 ml/min 2016    CKD (chronic kidney disease) stage 4, GFR 15-29 ml/min 2014    Colon polyps     Depression - situational 2014    Diabetes mellitus     Diabetes  type 2 since 2000 7/29/2014    Diabetic neuropathy 7/29/2014    History of cardioversion 10/3/2019    History of hepatitis C, s/p successful Rx w/ SVR24 - 9/2017 7/29/2014    Genotype 1a, treatment naive 10/2014 liver biopsy - grade 1 / stage 1 Completed Harvoni w/ SVR    Hyperlipidemia 7/29/2014    Hypertension     Neuropathy     Organ transplant candidate 7/29/2014    Pancreatitis     S/P cadaveric kidney transplant 11/5/2016. ESRD secondary to HTN/DMII 11/5/2016    Stage 3a chronic kidney disease 12/28/2016    Type 2 diabetes mellitus with stage 3a chronic kidney disease, with long-term current use of insulin 7/29/2014      Past Surgical History:   Procedure Laterality Date    APPENDECTOMY      COLONOSCOPY N/A 12/21/2020    Procedure: COLONOSCOPY;  Surgeon: Tico Bell MD;  Location: [facility] ENDO (4TH FLR);  Service: Endoscopy;  Laterality: N/A;  ok to hold eliquis per Dr. Valadez, see telephone encounter 11/13/2020-MS  covid test 12/18 [place]    KIDNEY TRANSPLANT      TRANSESOPHAGEAL ECHOCARDIOGRAPHY N/A 8/26/2019    Procedure: ECHOCARDIOGRAM, TRANSESOPHAGEAL;  Surgeon: [facility] Diagnostic Provider;  Location: [facility] EP LAB;  Service: Cardiology;  Laterality: N/A;    TREATMENT OF CARDIAC ARRHYTHMIA N/A 8/26/2019    Procedure: CARDIOVERSION;  Surgeon: Bronson Bowden MD;  Location: [facility] EP LAB;  Service: Cardiology;  Laterality: N/A;  AF, FELICIANO, DCCV, MAC, GP, 3 PREP        Current Outpatient Medications:     apixaban (ELIQUIS) 5 mg Tab, Take 1 tablet (5 mg total) by mouth 2 (two) times daily., Disp: 60 tablet, Rfl: 11    aspirin 81 MG Chew, Take 81 mg by mouth once daily., Disp: , Rfl:     atorvastatin (LIPITOR) 40 MG tablet, Take 1 tablet (40 mg total) by mouth once daily., Disp: 90 tablet, Rfl: 3    blood sugar diagnostic (ONETOUCH ULTRA TEST) Strp, USE 1 STRIP TO CHECK BLOOD GLUCOSE FOUR TIMES DAILY, Disp: 400 strip, Rfl: 3    carvediloL (COREG) 25 MG tablet, Take 1 tablet (25 mg total) by mouth 2 (two) times daily.,  "Disp: 180 tablet, Rfl: 3    famotidine (PEPCID) 20 MG tablet, Take 1 tablet (20 mg total) by mouth every evening., Disp: 90 tablet, Rfl: 3    gabapentin (NEURONTIN) 300 MG capsule, Take 1 capsule by mouth in the morning, 1 capsule midday, and 3 capsules at night., Disp: 450 capsule, Rfl: 3    insulin (LANTUS SOLOSTAR U-100 INSULIN) glargine 100 units/mL (3mL) SubQ pen, Inject 35 units subcutaneously at night (Patient taking differently: Inject 35 units subcutaneously at night), Disp: 12 mL, Rfl: 6    insulin lispro (HUMALOG KWIKPEN INSULIN) 100 unit/mL pen, Inject 18 units w/ breakfast, 16 units w/ lunch and dinner plus scale 150-200 +2, 201-250 +4, 251-300 +6, 301-350 +8., Disp: 30 mL, Rfl: 4    lancets (ONETOUCH DELICA PLUS LANCET) 33 gauge Misc, USE TO CHECK BLOOD GLUCOSE FOUR TIMES DAILY, Disp: 400 each, Rfl: 3    meclizine (ANTIVERT) 25 mg tablet, Take 1 tablet (25 mg total) by mouth 3 (three) times daily as needed for Dizziness., Disp: 21 tablet, Rfl: 0    melatonin 5 mg Tab, Take 1 tablet (5 mg total) by mouth every evening., Disp: , Rfl:     pen needle, diabetic (BD ULTRA-FINE LO PEN NEEDLE) 32 gauge x 5/32" Ndle, USE TO ADMINISTER INSULIN 4 TIMES DAILY., Disp: 400 each, Rfl: 3    spironolactone (ALDACTONE) 25 MG tablet, Take 1 tablet (25 mg total) by mouth once daily., Disp: 90 tablet, Rfl: 1    tacrolimus (PROGRAF) 0.5 MG Cap, Take 1 capsule (0.5 mg total) by mouth every 12 (twelve) hours., Disp: 60 capsule, Rfl: 11    valsartan (DIOVAN) 320 MG tablet, Take 1 tablet (320 mg total) by mouth once daily., Disp: 90 tablet, Rfl: 3    chlorthalidone (HYGROTEN) 25 MG Tab, Take 2 tablets (50 mg total) by mouth once daily., Disp: 90 tablet, Rfl: 3    ferrous sulfate (SLOW FE) 142 mg (45 mg iron) TbSR, Take 1 tablet (142 mg total) by mouth Daily., Disp: 90 tablet, Rfl: 3    flecainide (TAMBOCOR) 100 MG Tab, Take 1 tablet (100 mg total) by mouth every 12 (twelve) hours., Disp: 60 tablet, Rfl: 0    hydrALAZINE " (APRESOLINE) 25 MG tablet, Take 1 tablet (25 mg total) by mouth every 12 (twelve) hours., Disp: 60 tablet, Rfl: 11    mycophenolate (CELLCEPT) 250 mg Cap, Take 4 capsules (1,000 mg total) by mouth 2 (two) times daily., Disp: 240 capsule, Rfl: 11    predniSONE (DELTASONE) 5 MG tablet, Take 1 tablet (5 mg total) by mouth once daily., Disp: 30 tablet, Rfl: 11    semaglutide (OZEMPIC) 0.25 mg or 0.5 mg(2 mg/1.5 mL) pen injector, Inject 0.5 mg weekly., Disp: 3 pen, Rfl: 3    tacrolimus (PROGRAF) 0.5 MG Cap, Take 1 capsule (0.5 mg total) by mouth every 12 (twelve) hours., Disp: 60 capsule, Rfl: 11  Review of patient's allergies indicates:   Allergen Reactions    Nifedipine Other (See Comments)     Gingival hyperplasia      Social History     Tobacco Use    Smoking status: Former     Packs/day: 0.50     Years: 32.00     Pack years: 16.00     Types: Cigarettes     Quit date: 2016     Years since quittin.0    Smokeless tobacco: Never    Tobacco comments:     Pt reports that he quit in , but started up again in . pt reports he is currently working on quitting again   Substance Use Topics    Alcohol use: Yes     Comment: Pt reports occasional beers on Sundays. Pt reports drinking daily prior to ESRD diagnosis.      Family History   Problem Relation Age of Onset    Diabetes Mother     Hypertension Mother     Heart disease Mother         CAD with PCI    Heart disease Father     Cancer Brother         one sister with breast cancer    Hypertension Brother         one sister with HTN and borderline DM    Stroke Neg Hx     Kidney disease Neg Hx         Physical Exam :  Vitals:    22 1443   BP: (!) 160/70   Pulse: (!) 56     Physical Exam  Vitals and nursing note reviewed.   Constitutional:       General: He is not in acute distress.     Appearance: Normal appearance. He is not ill-appearing, toxic-appearing or diaphoretic.   HENT:      Mouth/Throat:      Mouth: Mucous membranes are moist.   Cardiovascular:       Rate and Rhythm: Normal rate and regular rhythm.      Pulses: Normal pulses.      Heart sounds: Normal heart sounds. No murmur heard.     Comments: +AVF  Pulmonary:      Effort: Pulmonary effort is normal. No respiratory distress.      Breath sounds: Normal breath sounds. No stridor. No wheezing, rhonchi or rales.   Abdominal:      General: Abdomen is flat. Bowel sounds are normal. There is no distension.      Palpations: Abdomen is soft. There is no mass.      Tenderness: There is no abdominal tenderness. There is no guarding or rebound.      Hernia: No hernia is present.   Musculoskeletal:         General: Swelling present. No tenderness, deformity or signs of injury.      Right lower leg: Edema present.      Left lower leg: Edema present.   Skin:     General: Skin is warm.      Capillary Refill: Capillary refill takes less than 2 seconds.      Coloration: Skin is not jaundiced or pale.      Findings: No bruising, erythema, lesion or rash.   Neurological:      Mental Status: He is alert and oriented to person, place, and time.   Psychiatric:         Mood and Affect: Mood normal.         Behavior: Behavior normal.         Thought Content: Thought content normal.         Judgment: Judgment normal.       Labs reviewed   Images Reviewed    Assessment:    1. Stage 3a chronic kidney disease    2. Renovascular hypertension    3. S/P cadaveric kidney transplant 11/5/2016. ESRD secondary to HTN/DMII    4. Persistent atrial fibrillation    5. Type 2 diabetes mellitus with both eyes affected by moderate nonproliferative retinopathy without macular edema, without long-term current use of insulin        Plan:    Stage 3a chronic kidney disease  Post-transplant CDK likely related to chronic CNI therapy, HTN, and diabetes   Baseline sCr  1.6-1.9  8/25 sCr 1.7     Plan  Decrease gabapentin 300mg TID   -Mineral & Bone Disorder:  Phosphorus level: 3.3 PTH level 129 repeat labs on f/u   -CKD anemia: hgb 11.9, goal hgb 10-11g/dL,  repeat iron studies and will assess need for iron infusions and for ESAs  -continue valsartan 320 mg daily   -Tight control of diabetes (A1C <7) and HTN (<130/80)  -When possible avoid nephrotoxins (NSAIDS, long term use of PPIs, IV contrast/Gadolinium exposure)      S/P cadaveric kidney transplant 11/5/2016. ESRD secondary to HTN/DMII  Transplant medications: Tacrolimus 0.5mg twice daily, Prednisone 5mg daily, and Mycophenolate 250mg 4 caps twice daily     Plan:   Check tacrolimus level as per transplant nephrology protocols     Hypertension since 2000  Needs f/u with cardiology for review of all cardiac medications.   As of 8/2022 these are the medications he is taking: carvedilol 25mg bid, spironolactone, valsartan 320mg daily, hydralazine 100mg every 8 hours    Persistent atrial fibrillation  F/u with cardioloy     Type 2 diabetes mellitus with both eyes affected by moderate nonproliferative retinopathy without macular edema, without long-term current use of insulin  7/2022 hgb A1C 7.8   Pt to f/u with PCE and endocrinology     Follow up in about 2 months (around 10/25/2022).     No orders of the defined types were placed in this encounter.    Medications Discontinued During This Encounter   Medication Reason    chlorthalidone (HYGROTEN) 25 MG Tab       Future Appointments   Date Time Provider Department Center   12/1/2022  1:30 PM Fanny Willis MD [facility] NEPHRO Arvind Jay   12/23/2022 12:00 PM [facility] [id number] [facility]   1/23/2023 10:00 AM EKG, APPT [facility] EKG Arvind Jay   1/23/2023 10:40 AM Brosnon Bowden MD [facility] ARRHYTH Arvind Jay

## 2022-08-26 ENCOUNTER — TELEPHONE (OUTPATIENT)
Dept: TRANSPLANT | Facility: CLINIC | Age: 67
End: 2022-08-26
Payer: COMMERCIAL

## 2022-08-26 DIAGNOSIS — Z94.0 KIDNEY REPLACED BY TRANSPLANT: Primary | ICD-10-CM

## 2022-08-26 NOTE — TELEPHONE ENCOUNTER
----- Message from Amber Cain RN sent at 8/24/2022  6:08 PM CDT -----    ----- Message -----  From: Bola Sosa MD  Sent: 8/24/2022   6:03 PM CDT  To: [facility] Post-Kidney Transplant Clinical    I sent a message to nephrology for follow up after Dr Ewing's departure  Advise BP control <130/80  Low salt diet  Lipid management with his PCP  Ask if he is taking his valsartan as prescribed . Verify he is on MMF 1000 bid  Order an allosure now and in 1 month  Wt management

## 2022-09-03 RX ORDER — FLECAINIDE ACETATE 100 MG/1
100 TABLET ORAL EVERY 12 HOURS
Qty: 60 TABLET | Refills: 0 | Status: CANCELLED | OUTPATIENT
Start: 2022-09-03

## 2022-09-06 DIAGNOSIS — Z94.0 KIDNEY REPLACED BY TRANSPLANT: Primary | ICD-10-CM

## 2022-09-06 RX ORDER — FLECAINIDE ACETATE 100 MG/1
100 TABLET ORAL EVERY 12 HOURS
Qty: 60 TABLET | Refills: 0 | Status: SHIPPED | OUTPATIENT
Start: 2022-09-06 | End: 2022-10-07 | Stop reason: SDUPTHER

## 2022-09-08 ENCOUNTER — LAB VISIT (OUTPATIENT)
Dept: LAB | Facility: HOSPITAL | Age: 67
End: 2022-09-08
Attending: INTERNAL MEDICINE
Payer: COMMERCIAL

## 2022-09-08 DIAGNOSIS — Z94.0 KIDNEY REPLACED BY TRANSPLANT: ICD-10-CM

## 2022-09-08 PROCEDURE — 36415 COLL VENOUS BLD VENIPUNCTURE: CPT | Performed by: INTERNAL MEDICINE

## 2022-09-10 LAB
ALLOSURE COMMENT: NORMAL
ALLOSURE SCORE KIDNEY: 0.14 %
INFORMATION: NORMAL

## 2022-09-14 DIAGNOSIS — E11.9 TYPE 2 DIABETES MELLITUS WITHOUT COMPLICATION: ICD-10-CM

## 2022-09-23 ENCOUNTER — PATIENT OUTREACH (OUTPATIENT)
Dept: ADMINISTRATIVE | Facility: HOSPITAL | Age: 67
End: 2022-09-23
Payer: MEDICARE

## 2022-09-23 NOTE — PROGRESS NOTES
Health Maintenance Due   Topic Date Due    Diabetes Urine Screening  Never done    Foot Exam  12/02/2021    COVID-19 Vaccine (4 - Booster for Pfizer series) 12/25/2021    Lipid Panel  06/22/2022    Influenza Vaccine (1) 09/01/2022        updated.  Letter mailed out regarding overdue tests.     MariaD e Jesus Ny LPN   Clinical Care Coordinator  Primary Care and Wellness

## 2022-09-28 ENCOUNTER — TELEPHONE (OUTPATIENT)
Dept: INTERNAL MEDICINE | Facility: CLINIC | Age: 67
End: 2022-09-28
Payer: MEDICARE

## 2022-09-28 DIAGNOSIS — R19.5 DARK STOOLS: Primary | ICD-10-CM

## 2022-09-28 NOTE — TELEPHONE ENCOUNTER
----- Message from Glenys Ramirez sent at 9/28/2022 10:29 AM CDT -----  Contact: [phone]  Pt is asking for the dr to give him a return call in regards to his stool he states it is darker than usual

## 2022-09-28 NOTE — TELEPHONE ENCOUNTER
Pt is coming in the morning to  stool kit for samples. He also scheduled a f/u appt with willian flower PA-C on 10/10/22 @ 3

## 2022-09-28 NOTE — TELEPHONE ENCOUNTER
Pt stated that his stool has been darker than usual.  He didn't say anything before because he stated that he thought it would just resolve on its own.   And is wanting to find out why it is happening

## 2022-10-07 DIAGNOSIS — I49.8 OTHER CARDIAC ARRHYTHMIA: Primary | ICD-10-CM

## 2022-10-07 RX ORDER — FLECAINIDE ACETATE 100 MG/1
100 TABLET ORAL EVERY 12 HOURS
Qty: 60 TABLET | Refills: 0 | Status: SHIPPED | OUTPATIENT
Start: 2022-10-07 | End: 2022-10-31 | Stop reason: SDUPTHER

## 2022-10-10 ENCOUNTER — OFFICE VISIT (OUTPATIENT)
Dept: INTERNAL MEDICINE | Facility: CLINIC | Age: 67
End: 2022-10-10
Payer: COMMERCIAL

## 2022-10-10 VITALS
WEIGHT: 195.31 LBS | DIASTOLIC BLOOD PRESSURE: 66 MMHG | SYSTOLIC BLOOD PRESSURE: 144 MMHG | HEIGHT: 73 IN | OXYGEN SATURATION: 100 % | HEART RATE: 55 BPM | BODY MASS INDEX: 25.88 KG/M2

## 2022-10-10 DIAGNOSIS — I10 HYPERTENSION, UNSPECIFIED TYPE: Primary | ICD-10-CM

## 2022-10-10 DIAGNOSIS — D63.8 ANEMIA OF CHRONIC DISEASE: ICD-10-CM

## 2022-10-10 PROCEDURE — 3008F PR BODY MASS INDEX (BMI) DOCUMENTED: ICD-10-PCS | Mod: CPTII,S$GLB,, | Performed by: PHYSICIAN ASSISTANT

## 2022-10-10 PROCEDURE — 3077F SYST BP >= 140 MM HG: CPT | Mod: CPTII,S$GLB,, | Performed by: PHYSICIAN ASSISTANT

## 2022-10-10 PROCEDURE — 3051F PR MOST RECENT HEMOGLOBIN A1C LEVEL 7.0 - < 8.0%: ICD-10-PCS | Mod: CPTII,S$GLB,, | Performed by: PHYSICIAN ASSISTANT

## 2022-10-10 PROCEDURE — 90686 IIV4 VACC NO PRSV 0.5 ML IM: CPT | Mod: S$GLB,,, | Performed by: PHYSICIAN ASSISTANT

## 2022-10-10 PROCEDURE — 3066F PR DOCUMENTATION OF TREATMENT FOR NEPHROPATHY: ICD-10-PCS | Mod: CPTII,S$GLB,, | Performed by: PHYSICIAN ASSISTANT

## 2022-10-10 PROCEDURE — 1160F RVW MEDS BY RX/DR IN RCRD: CPT | Mod: CPTII,S$GLB,, | Performed by: PHYSICIAN ASSISTANT

## 2022-10-10 PROCEDURE — 3078F DIAST BP <80 MM HG: CPT | Mod: CPTII,S$GLB,, | Performed by: PHYSICIAN ASSISTANT

## 2022-10-10 PROCEDURE — 1160F PR REVIEW ALL MEDS BY PRESCRIBER/CLIN PHARMACIST DOCUMENTED: ICD-10-PCS | Mod: CPTII,S$GLB,, | Performed by: PHYSICIAN ASSISTANT

## 2022-10-10 PROCEDURE — 3051F HG A1C>EQUAL 7.0%<8.0%: CPT | Mod: CPTII,S$GLB,, | Performed by: PHYSICIAN ASSISTANT

## 2022-10-10 PROCEDURE — 3008F BODY MASS INDEX DOCD: CPT | Mod: CPTII,S$GLB,, | Performed by: PHYSICIAN ASSISTANT

## 2022-10-10 PROCEDURE — 4010F ACE/ARB THERAPY RXD/TAKEN: CPT | Mod: CPTII,S$GLB,, | Performed by: PHYSICIAN ASSISTANT

## 2022-10-10 PROCEDURE — 99999 PR PBB SHADOW E&M-EST. PATIENT-LVL V: ICD-10-PCS | Mod: PBBFAC,,, | Performed by: PHYSICIAN ASSISTANT

## 2022-10-10 PROCEDURE — 3078F PR MOST RECENT DIASTOLIC BLOOD PRESSURE < 80 MM HG: ICD-10-PCS | Mod: CPTII,S$GLB,, | Performed by: PHYSICIAN ASSISTANT

## 2022-10-10 PROCEDURE — 1159F MED LIST DOCD IN RCRD: CPT | Mod: CPTII,S$GLB,, | Performed by: PHYSICIAN ASSISTANT

## 2022-10-10 PROCEDURE — 4010F PR ACE/ARB THEARPY RXD/TAKEN: ICD-10-PCS | Mod: CPTII,S$GLB,, | Performed by: PHYSICIAN ASSISTANT

## 2022-10-10 PROCEDURE — 90471 IMMUNIZATION ADMIN: CPT | Mod: S$GLB,,, | Performed by: PHYSICIAN ASSISTANT

## 2022-10-10 PROCEDURE — 99213 OFFICE O/P EST LOW 20 MIN: CPT | Mod: 25,S$GLB,, | Performed by: PHYSICIAN ASSISTANT

## 2022-10-10 PROCEDURE — 90686 FLU VACCINE (QUAD) GREATER THAN OR EQUAL TO 3YO PRESERVATIVE FREE IM: ICD-10-PCS | Mod: S$GLB,,, | Performed by: PHYSICIAN ASSISTANT

## 2022-10-10 PROCEDURE — 90471 FLU VACCINE (QUAD) GREATER THAN OR EQUAL TO 3YO PRESERVATIVE FREE IM: ICD-10-PCS | Mod: S$GLB,,, | Performed by: PHYSICIAN ASSISTANT

## 2022-10-10 PROCEDURE — 1159F PR MEDICATION LIST DOCUMENTED IN MEDICAL RECORD: ICD-10-PCS | Mod: CPTII,S$GLB,, | Performed by: PHYSICIAN ASSISTANT

## 2022-10-10 PROCEDURE — 3077F PR MOST RECENT SYSTOLIC BLOOD PRESSURE >= 140 MM HG: ICD-10-PCS | Mod: CPTII,S$GLB,, | Performed by: PHYSICIAN ASSISTANT

## 2022-10-10 PROCEDURE — 99999 PR PBB SHADOW E&M-EST. PATIENT-LVL V: CPT | Mod: PBBFAC,,, | Performed by: PHYSICIAN ASSISTANT

## 2022-10-10 PROCEDURE — 99213 PR OFFICE/OUTPT VISIT, EST, LEVL III, 20-29 MIN: ICD-10-PCS | Mod: 25,S$GLB,, | Performed by: PHYSICIAN ASSISTANT

## 2022-10-10 PROCEDURE — 3066F NEPHROPATHY DOC TX: CPT | Mod: CPTII,S$GLB,, | Performed by: PHYSICIAN ASSISTANT

## 2022-10-10 NOTE — PROGRESS NOTES
Subjective:       Patient ID: Ravi Zamorano is a 66 y.o. male.    Chief Complaint: Follow-up    HPI    Established pt of Mima Mack MD (new to me)    Pt was scheduled to f/u after he called about 2 weeks ago with concerns of dark stool. Occult Stool was ordered but not completed.     Pt states dark stools have resolved, after he stopping taking iron supplements. (Stared by Nephro about 6 weeks ago)    He denies brbpr, melena, rectal pain, abdominal pain, n/v/d, or unexpected weight loss.     Last c-scope 2020, recommend repeat in 5 years    BP elevated today. Current BP meds include 5 agents.   Spironolactone 25mg, chlorthalidone 25mg, carvedilol 25mg BID, valsartan 320mg, hydralazine 100mg TID    He does reports he may take 25mg hydralazine instead1 00mg as prescribed.     Past Medical History:   Diagnosis Date    Anxiety 7/29/2014    Arthritis     Bilateral diabetic retinopathy 2017    CKD (chronic kidney disease) stage 2, GFR 60-89 ml/min 12/28/2016    CKD (chronic kidney disease) stage 4, GFR 15-29 ml/min 7/29/2014    Colon polyps 2014    Depression - situational 7/29/2014    Diabetes mellitus     Diabetes type 2 since 2000 7/29/2014    Diabetic neuropathy 7/29/2014    History of cardioversion 10/3/2019    History of hepatitis C, s/p successful Rx w/ SVR24 - 9/2017 7/29/2014    Genotype 1a, treatment naive 10/2014 liver biopsy - grade 1 / stage 1 Completed Harvoni w/ SVR    Hyperlipidemia 7/29/2014    Hypertension     Neuropathy     Organ transplant candidate 7/29/2014    Pancreatitis     S/P cadaveric kidney transplant 11/5/2016. ESRD secondary to HTN/DMII 11/5/2016    Stage 3a chronic kidney disease 12/28/2016    Type 2 diabetes mellitus with stage 3a chronic kidney disease, with long-term current use of insulin 7/29/2014     Social History     Tobacco Use    Smoking status: Former     Packs/day: 0.50     Years: 32.00     Pack years: 16.00     Types: Cigarettes     Quit date: 11/28/2016     Years  "since quittin.8    Smokeless tobacco: Never    Tobacco comments:     Pt reports that he quit in , but started up again in . pt reports he is currently working on quitting again   Substance Use Topics    Alcohol use: Yes     Comment: Pt reports occasional beers on Sundays. Pt reports drinking daily prior to ESRD diagnosis.    Drug use: No     Review of patient's allergies indicates:   Allergen Reactions    Nifedipine Other (See Comments)     Gingival hyperplasia       Review of Systems   Constitutional:  Negative for chills, fever and unexpected weight change.   Respiratory:  Negative for cough and shortness of breath.    Cardiovascular:  Negative for chest pain and leg swelling.   Gastrointestinal:  Negative for abdominal pain, blood in stool, change in bowel habit, nausea, vomiting and change in bowel habit.   Integumentary:  Negative for rash.   Neurological:  Negative for weakness and headaches.       Objective: BP (!) 158/78 (BP Location: Left arm, Patient Position: Sitting, BP Method: Large (Manual))   Pulse (!) 55   Ht 6' 1" (1.854 m)   Wt 88.6 kg (195 lb 5.2 oz)   SpO2 100%   BMI 25.77 kg/m²         Physical Exam  Vitals reviewed.   Constitutional:       General: He is not in acute distress.     Appearance: He is well-developed.   HENT:      Head: Normocephalic and atraumatic.   Cardiovascular:      Rate and Rhythm: Normal rate and regular rhythm.      Heart sounds: No murmur heard.  Pulmonary:      Effort: Pulmonary effort is normal.      Breath sounds: Normal breath sounds. No wheezing or rales.   Abdominal:      General: Bowel sounds are normal.      Palpations: Abdomen is soft.      Tenderness: There is no abdominal tenderness.   Musculoskeletal:      Right lower leg: No edema.      Left lower leg: No edema.   Skin:     General: Skin is warm and dry.      Findings: No rash.   Neurological:      Mental Status: He is alert.       Assessment:       Problem List Items Addressed This Visit  "         Cardiac/Vascular    Hypertension since 2000 - Primary       Oncology    Anemia of chronic disease         Plan:       Ravi was seen today for follow-up.    Diagnoses and all orders for this visit:    Hypertension, unspecified type  Slightly elevated 144/66  No hydralazine today  Advised to keep BP log and bring to follow up appt with Nephrology on 10/30    Anemia of chronic disease  stable  Dark stool resolved, likely related to the start of iron supplements  Red flags s/s reviewed  Repeat lab this week per Nephro    Other orders  -     Influenza - Quadrivalent (PF)        Future Appointments   Date Time Provider Department Center   10/13/2022  7:00 AM LAB, SPECIMEN [facility] INTMED [facility] SPLABIM [facility]   10/13/2022 10:00 AM LAB, TRANSPLANT [facility] LABTX [facility]   10/20/2022  2:30 PM Fanny Willis MD [facility] NEPHRO [facility]   10/27/2022  9:30 AM Mima Mack MD [facility] IM [facility]   1/23/2023 10:00 AM EKG, APPT [facility] EKG [facility]   1/23/2023 10:40 AM Bronson Bowden MD [facility] ARRHYTH [facility]       Mckenna Jefferson PA-C

## 2022-10-10 NOTE — PATIENT INSTRUCTIONS
Bring your blood pressure readings to your next appointment with Nephrology and PCP    Document whether you take hydralazine and what dose.

## 2022-10-13 ENCOUNTER — LAB VISIT (OUTPATIENT)
Dept: LAB | Facility: HOSPITAL | Age: 67
End: 2022-10-13
Attending: INTERNAL MEDICINE
Payer: MEDICARE

## 2022-10-13 DIAGNOSIS — N18.31 STAGE 3A CHRONIC KIDNEY DISEASE: ICD-10-CM

## 2022-10-13 DIAGNOSIS — Z94.0 KIDNEY REPLACED BY TRANSPLANT: ICD-10-CM

## 2022-10-13 DIAGNOSIS — N18.2 CKD (CHRONIC KIDNEY DISEASE) STAGE 2, GFR 60-89 ML/MIN: ICD-10-CM

## 2022-10-13 LAB
ALBUMIN SERPL BCP-MCNC: 3.3 G/DL (ref 3.5–5.2)
ANION GAP SERPL CALC-SCNC: 9 MMOL/L (ref 8–16)
ANION GAP SERPL CALC-SCNC: 9 MMOL/L (ref 8–16)
BASOPHILS # BLD AUTO: 0.03 K/UL (ref 0–0.2)
BASOPHILS NFR BLD: 0.6 % (ref 0–1.9)
BUN SERPL-MCNC: 26 MG/DL (ref 8–23)
BUN SERPL-MCNC: 26 MG/DL (ref 8–23)
CALCIUM SERPL-MCNC: 9.3 MG/DL (ref 8.7–10.5)
CALCIUM SERPL-MCNC: 9.3 MG/DL (ref 8.7–10.5)
CHLORIDE SERPL-SCNC: 102 MMOL/L (ref 95–110)
CHLORIDE SERPL-SCNC: 102 MMOL/L (ref 95–110)
CO2 SERPL-SCNC: 26 MMOL/L (ref 23–29)
CO2 SERPL-SCNC: 26 MMOL/L (ref 23–29)
CREAT SERPL-MCNC: 1.8 MG/DL (ref 0.5–1.4)
CREAT SERPL-MCNC: 1.8 MG/DL (ref 0.5–1.4)
DIFFERENTIAL METHOD: ABNORMAL
EOSINOPHIL # BLD AUTO: 0.1 K/UL (ref 0–0.5)
EOSINOPHIL NFR BLD: 1.3 % (ref 0–8)
ERYTHROCYTE [DISTWIDTH] IN BLOOD BY AUTOMATED COUNT: 16.6 % (ref 11.5–14.5)
EST. GFR  (NO RACE VARIABLE): 41.5 ML/MIN/1.73 M^2
EST. GFR  (NO RACE VARIABLE): 41.5 ML/MIN/1.73 M^2
GLUCOSE SERPL-MCNC: 172 MG/DL (ref 70–110)
GLUCOSE SERPL-MCNC: 172 MG/DL (ref 70–110)
HCT VFR BLD AUTO: 36.3 % (ref 40–54)
HGB BLD-MCNC: 10.5 G/DL (ref 14–18)
IMM GRANULOCYTES # BLD AUTO: 0.14 K/UL (ref 0–0.04)
IMM GRANULOCYTES NFR BLD AUTO: 3 % (ref 0–0.5)
LYMPHOCYTES # BLD AUTO: 0.7 K/UL (ref 1–4.8)
LYMPHOCYTES NFR BLD: 14 % (ref 18–48)
MCH RBC QN AUTO: 22.8 PG (ref 27–31)
MCHC RBC AUTO-ENTMCNC: 28.9 G/DL (ref 32–36)
MCV RBC AUTO: 79 FL (ref 82–98)
MONOCYTES # BLD AUTO: 1 K/UL (ref 0.3–1)
MONOCYTES NFR BLD: 20.5 % (ref 4–15)
NEUTROPHILS # BLD AUTO: 2.9 K/UL (ref 1.8–7.7)
NEUTROPHILS NFR BLD: 60.6 % (ref 38–73)
NRBC BLD-RTO: 0 /100 WBC
PHOSPHATE SERPL-MCNC: 2.8 MG/DL (ref 2.7–4.5)
PLATELET # BLD AUTO: 152 K/UL (ref 150–450)
PMV BLD AUTO: ABNORMAL FL (ref 9.2–12.9)
POTASSIUM SERPL-SCNC: 4.2 MMOL/L (ref 3.5–5.1)
POTASSIUM SERPL-SCNC: 4.2 MMOL/L (ref 3.5–5.1)
RBC # BLD AUTO: 4.61 M/UL (ref 4.6–6.2)
SODIUM SERPL-SCNC: 137 MMOL/L (ref 136–145)
SODIUM SERPL-SCNC: 137 MMOL/L (ref 136–145)
WBC # BLD AUTO: 4.73 K/UL (ref 3.9–12.7)

## 2022-10-13 PROCEDURE — 85025 COMPLETE CBC W/AUTO DIFF WBC: CPT | Performed by: STUDENT IN AN ORGANIZED HEALTH CARE EDUCATION/TRAINING PROGRAM

## 2022-10-13 PROCEDURE — 36415 COLL VENOUS BLD VENIPUNCTURE: CPT | Performed by: INTERNAL MEDICINE

## 2022-10-13 PROCEDURE — 80069 RENAL FUNCTION PANEL: CPT | Performed by: STUDENT IN AN ORGANIZED HEALTH CARE EDUCATION/TRAINING PROGRAM

## 2022-10-15 LAB
ALLOSURE COMMENT: NORMAL
ALLOSURE SCORE KIDNEY: <0.12 %
INFORMATION: NORMAL

## 2022-10-20 ENCOUNTER — TELEPHONE (OUTPATIENT)
Dept: NEPHROLOGY | Facility: CLINIC | Age: 67
End: 2022-10-20
Payer: MEDICARE

## 2022-10-20 NOTE — TELEPHONE ENCOUNTER
appt  Received: Today   Appointment Access, Pt Advice  Madonna Escobedo Staff  Caller: [phone] (Today, 12:32 PM)  Pt is stating he has an appt at 2:30 pm but his recently blood pressure reading was 115/58. Pt is asking for advise to bring up his blood pressure before attempting to drive himself to appt. Please call to discuss further.

## 2022-10-21 ENCOUNTER — TELEPHONE (OUTPATIENT)
Dept: TRANSPLANT | Facility: CLINIC | Age: 67
End: 2022-10-21
Payer: MEDICARE

## 2022-10-21 NOTE — TELEPHONE ENCOUNTER
----- Message from Bola Sosa MD sent at 10/21/2022  1:21 PM CDT -----  Any acute issues with this gentleman. Is he hydrating well. Let me know. Thanks

## 2022-10-27 ENCOUNTER — OFFICE VISIT (OUTPATIENT)
Dept: INTERNAL MEDICINE | Facility: CLINIC | Age: 67
End: 2022-10-27
Payer: COMMERCIAL

## 2022-10-27 VITALS
SYSTOLIC BLOOD PRESSURE: 138 MMHG | HEART RATE: 53 BPM | WEIGHT: 194.56 LBS | HEIGHT: 73 IN | DIASTOLIC BLOOD PRESSURE: 60 MMHG | OXYGEN SATURATION: 94 % | BODY MASS INDEX: 25.79 KG/M2

## 2022-10-27 DIAGNOSIS — I50.32 CHRONIC DIASTOLIC CONGESTIVE HEART FAILURE: ICD-10-CM

## 2022-10-27 DIAGNOSIS — Z94.0 S/P KIDNEY TRANSPLANT: ICD-10-CM

## 2022-10-27 DIAGNOSIS — Z12.5 SPECIAL SCREENING FOR MALIGNANT NEOPLASM OF PROSTATE: ICD-10-CM

## 2022-10-27 DIAGNOSIS — M25.562 CHRONIC PAIN OF LEFT KNEE: ICD-10-CM

## 2022-10-27 DIAGNOSIS — E78.5 HYPERLIPIDEMIA, UNSPECIFIED HYPERLIPIDEMIA TYPE: ICD-10-CM

## 2022-10-27 DIAGNOSIS — E11.3393 TYPE 2 DIABETES MELLITUS WITH BOTH EYES AFFECTED BY MODERATE NONPROLIFERATIVE RETINOPATHY WITHOUT MACULAR EDEMA, WITHOUT LONG-TERM CURRENT USE OF INSULIN: Primary | ICD-10-CM

## 2022-10-27 DIAGNOSIS — B35.1 FUNGAL NAIL INFECTION: ICD-10-CM

## 2022-10-27 DIAGNOSIS — G89.29 CHRONIC PAIN OF LEFT KNEE: ICD-10-CM

## 2022-10-27 DIAGNOSIS — I10 HYPERTENSION, UNSPECIFIED TYPE: ICD-10-CM

## 2022-10-27 PROCEDURE — 1160F PR REVIEW ALL MEDS BY PRESCRIBER/CLIN PHARMACIST DOCUMENTED: ICD-10-PCS | Mod: CPTII,S$GLB,, | Performed by: INTERNAL MEDICINE

## 2022-10-27 PROCEDURE — 3078F DIAST BP <80 MM HG: CPT | Mod: CPTII,S$GLB,, | Performed by: INTERNAL MEDICINE

## 2022-10-27 PROCEDURE — 99999 PR PBB SHADOW E&M-EST. PATIENT-LVL V: ICD-10-PCS | Mod: PBBFAC,,, | Performed by: INTERNAL MEDICINE

## 2022-10-27 PROCEDURE — 3066F NEPHROPATHY DOC TX: CPT | Mod: CPTII,S$GLB,, | Performed by: INTERNAL MEDICINE

## 2022-10-27 PROCEDURE — 3075F SYST BP GE 130 - 139MM HG: CPT | Mod: CPTII,S$GLB,, | Performed by: INTERNAL MEDICINE

## 2022-10-27 PROCEDURE — 3051F PR MOST RECENT HEMOGLOBIN A1C LEVEL 7.0 - < 8.0%: ICD-10-PCS | Mod: CPTII,S$GLB,, | Performed by: INTERNAL MEDICINE

## 2022-10-27 PROCEDURE — 99999 PR PBB SHADOW E&M-EST. PATIENT-LVL V: CPT | Mod: PBBFAC,,, | Performed by: INTERNAL MEDICINE

## 2022-10-27 PROCEDURE — 4010F ACE/ARB THERAPY RXD/TAKEN: CPT | Mod: CPTII,S$GLB,, | Performed by: INTERNAL MEDICINE

## 2022-10-27 PROCEDURE — 99499 UNLISTED E&M SERVICE: CPT | Mod: S$GLB,,, | Performed by: INTERNAL MEDICINE

## 2022-10-27 PROCEDURE — 99499 RISK ADDL DX/OHS AUDIT: ICD-10-PCS | Mod: S$GLB,,, | Performed by: INTERNAL MEDICINE

## 2022-10-27 PROCEDURE — 4010F PR ACE/ARB THEARPY RXD/TAKEN: ICD-10-PCS | Mod: CPTII,S$GLB,, | Performed by: INTERNAL MEDICINE

## 2022-10-27 PROCEDURE — 99214 PR OFFICE/OUTPT VISIT, EST, LEVL IV, 30-39 MIN: ICD-10-PCS | Mod: S$GLB,,, | Performed by: INTERNAL MEDICINE

## 2022-10-27 PROCEDURE — 3051F HG A1C>EQUAL 7.0%<8.0%: CPT | Mod: CPTII,S$GLB,, | Performed by: INTERNAL MEDICINE

## 2022-10-27 PROCEDURE — 3078F PR MOST RECENT DIASTOLIC BLOOD PRESSURE < 80 MM HG: ICD-10-PCS | Mod: CPTII,S$GLB,, | Performed by: INTERNAL MEDICINE

## 2022-10-27 PROCEDURE — 1159F MED LIST DOCD IN RCRD: CPT | Mod: CPTII,S$GLB,, | Performed by: INTERNAL MEDICINE

## 2022-10-27 PROCEDURE — 1159F PR MEDICATION LIST DOCUMENTED IN MEDICAL RECORD: ICD-10-PCS | Mod: CPTII,S$GLB,, | Performed by: INTERNAL MEDICINE

## 2022-10-27 PROCEDURE — 3066F PR DOCUMENTATION OF TREATMENT FOR NEPHROPATHY: ICD-10-PCS | Mod: CPTII,S$GLB,, | Performed by: INTERNAL MEDICINE

## 2022-10-27 PROCEDURE — 1160F RVW MEDS BY RX/DR IN RCRD: CPT | Mod: CPTII,S$GLB,, | Performed by: INTERNAL MEDICINE

## 2022-10-27 PROCEDURE — 3075F PR MOST RECENT SYSTOLIC BLOOD PRESS GE 130-139MM HG: ICD-10-PCS | Mod: CPTII,S$GLB,, | Performed by: INTERNAL MEDICINE

## 2022-10-27 PROCEDURE — 99214 OFFICE O/P EST MOD 30 MIN: CPT | Mod: S$GLB,,, | Performed by: INTERNAL MEDICINE

## 2022-10-27 RX ORDER — HYDRALAZINE HYDROCHLORIDE 25 MG/1
25 TABLET, FILM COATED ORAL EVERY 12 HOURS
Qty: 60 TABLET | Refills: 11 | Status: SHIPPED | OUTPATIENT
Start: 2022-10-27 | End: 2023-11-08

## 2022-10-27 NOTE — PROGRESS NOTES
"Subjective:       Patient ID: Ravi Zamorano is a 66 y.o. male.    Chief Complaint: Annual Exam    HPI  Ravi Zamorano is a 66 y.o. male here for routine follow up of the following chronic issues:    A1c 7.8% in July '22.   Gfr 41; stable.    HTN  Valsartan 320mg  Chlorthalidone 50mg daily  Hydralazine 100mg q8h - he stopped taking due to dropping his blood pressure; he was taking 25mg q8h instead intermittently.  Spirnolactone 25mg  carvedilol 25mg BID    Home readings have dropped to mostly 120s/70s for last week. He has only taken 25mg once when BP spiked to 189/82.    DM2  Lantus 35 u qhs  Humalog 18 bf; 16 lunch and dinner; plus SSI  Readings: 102, 130 per recall.     Trouble walking distances due to chronic left knee pain. Requests handicap tag. Xray 2021: no fracture nor bone destruction.      Review of Systems   Constitutional:  Negative for fever.   Respiratory:  Negative for shortness of breath.    Cardiovascular:  Negative for chest pain.   Musculoskeletal: Negative.    Skin: Negative.      Objective:   /60 (BP Location: Right arm, Patient Position: Sitting, BP Method: Medium (Manual))   Pulse (!) 53   Ht 6' 1" (1.854 m)   Wt 88.3 kg (194 lb 8.9 oz)   SpO2 (!) 94%   BMI 25.67 kg/m²      Physical Exam  Vitals reviewed.   Constitutional:       Appearance: He is well-developed.   HENT:      Head: Normocephalic and atraumatic.   Eyes:      Conjunctiva/sclera: Conjunctivae normal.      Pupils: Pupils are equal, round, and reactive to light.   Neck:      Thyroid: No thyromegaly.   Cardiovascular:      Rate and Rhythm: Normal rate and regular rhythm.      Heart sounds: Normal heart sounds. No murmur heard.  Pulmonary:      Effort: Pulmonary effort is normal. No respiratory distress.      Breath sounds: Normal breath sounds. No wheezing.   Abdominal:      General: There is no distension.      Palpations: Abdomen is soft.      Tenderness: There is no abdominal tenderness. There is no rebound. "   Musculoskeletal:         General: No swelling or deformity. Normal range of motion.      Cervical back: Neck supple.      Comments: Left knee without appreciable swelling, ROM intact, mild medial ttp   Lymphadenopathy:      Cervical: No cervical adenopathy.   Skin:     General: Skin is warm and dry.      Findings: No rash.   Neurological:      Mental Status: He is alert and oriented to person, place, and time.   Psychiatric:         Thought Content: Thought content normal.         Judgment: Judgment normal.       Protective Sensation (w/ 10 gram monofilament):  Right: Intact  Left: Intact    Visual Inspection:  Fungal infection under nails 1st and 2nd bilaterally    Pedal Pulses:   Right: Present  Left: Present    Posterior tibialis:   Right:Present  Left: Present    Assessment:       1. Type 2 diabetes mellitus with both eyes affected by moderate nonproliferative retinopathy without macular edema, without long-term current use of insulin    2. S/P cadaveric kidney transplant 11/5/2016. ESRD secondary to HTN/DMII    3. Hypertension, unspecified type    4. Hyperlipidemia, unspecified hyperlipidemia type    5. Chronic diastolic congestive heart failure    6. Special screening for malignant neoplasm of prostate    7. Chronic pain of left knee    8. Fungal nail infection        Plan:       Ravi was seen today for annual exam.    Diagnoses and all orders for this visit:    Type 2 diabetes mellitus with both eyes affected by moderate nonproliferative retinopathy without macular edema, without long-term current use of insulin  -     Hemoglobin A1C; Future  -     Microalbumin/Creatinine Ratio, Urine; Future    S/P cadaveric kidney transplant 11/5/2016. ESRD secondary to HTN/DMII    Hypertension, unspecified type  -     hydrALAZINE (APRESOLINE) 25 MG tablet; Take 1 tablet (25 mg total) by mouth every 12 (twelve) hours. (This is dose he has been taking lately with good control)  Continue:   Valsartan 320mg  Chlorthalidone  50mg daily  Spirnolactone 25mg  carvedilol 25mg BID    Hyperlipidemia, unspecified hyperlipidemia type    Chronic diastolic congestive heart failure    Special screening for malignant neoplasm of prostate  -     PSA, Screening; Future    Chronic pain of left knee  -     Ambulatory referral to Orthopedics; Future  -     X-ray Knee Ortho Bilateral; Future    Fungal nail infection  -     Ambulatory referral/consult to Podiatry; Future        xray  Ortho  podiatry  Foot exam  Covid booster  Handicap tag  Urine , psa & a1c added to upcoming lab appts

## 2022-10-31 RX ORDER — FLECAINIDE ACETATE 100 MG/1
100 TABLET ORAL EVERY 12 HOURS
Qty: 60 TABLET | Refills: 0 | Status: SHIPPED | OUTPATIENT
Start: 2022-10-31 | End: 2022-12-08 | Stop reason: SDUPTHER

## 2022-11-11 ENCOUNTER — TELEPHONE (OUTPATIENT)
Dept: TRANSPLANT | Facility: CLINIC | Age: 67
End: 2022-11-11
Payer: MEDICARE

## 2022-11-11 ENCOUNTER — LAB VISIT (OUTPATIENT)
Dept: LAB | Facility: HOSPITAL | Age: 67
End: 2022-11-11
Attending: INTERNAL MEDICINE
Payer: MEDICARE

## 2022-11-11 ENCOUNTER — TELEPHONE (OUTPATIENT)
Dept: INTERNAL MEDICINE | Facility: CLINIC | Age: 67
End: 2022-11-11
Payer: MEDICARE

## 2022-11-11 DIAGNOSIS — R80.9 MICROALBUMINURIA: ICD-10-CM

## 2022-11-11 DIAGNOSIS — E11.3393 TYPE 2 DIABETES MELLITUS WITH BOTH EYES AFFECTED BY MODERATE NONPROLIFERATIVE RETINOPATHY WITHOUT MACULAR EDEMA, WITHOUT LONG-TERM CURRENT USE OF INSULIN: ICD-10-CM

## 2022-11-11 DIAGNOSIS — R97.20 ELEVATED PSA: Primary | ICD-10-CM

## 2022-11-11 DIAGNOSIS — Z94.0 KIDNEY REPLACED BY TRANSPLANT: ICD-10-CM

## 2022-11-11 DIAGNOSIS — Z12.5 SPECIAL SCREENING FOR MALIGNANT NEOPLASM OF PROSTATE: ICD-10-CM

## 2022-11-11 LAB
ALBUMIN SERPL BCP-MCNC: 3.4 G/DL (ref 3.5–5.2)
ANION GAP SERPL CALC-SCNC: 10 MMOL/L (ref 8–16)
BASOPHILS # BLD AUTO: 0.02 K/UL (ref 0–0.2)
BASOPHILS NFR BLD: 0.4 % (ref 0–1.9)
BUN SERPL-MCNC: 29 MG/DL (ref 8–23)
CALCIUM SERPL-MCNC: 9.3 MG/DL (ref 8.7–10.5)
CHLORIDE SERPL-SCNC: 101 MMOL/L (ref 95–110)
CO2 SERPL-SCNC: 24 MMOL/L (ref 23–29)
COMPLEXED PSA SERPL-MCNC: 8.3 NG/ML (ref 0–4)
CREAT SERPL-MCNC: 1.7 MG/DL (ref 0.5–1.4)
DIFFERENTIAL METHOD: ABNORMAL
EOSINOPHIL # BLD AUTO: 0.1 K/UL (ref 0–0.5)
EOSINOPHIL NFR BLD: 1 % (ref 0–8)
ERYTHROCYTE [DISTWIDTH] IN BLOOD BY AUTOMATED COUNT: 16.5 % (ref 11.5–14.5)
EST. GFR  (NO RACE VARIABLE): 44.2 ML/MIN/1.73 M^2
ESTIMATED AVG GLUCOSE: 163 MG/DL (ref 68–131)
GLUCOSE SERPL-MCNC: 113 MG/DL (ref 70–110)
HBA1C MFR BLD: 7.3 % (ref 4–5.6)
HCT VFR BLD AUTO: 32.8 % (ref 40–54)
HGB BLD-MCNC: 10 G/DL (ref 14–18)
IMM GRANULOCYTES # BLD AUTO: 0.06 K/UL (ref 0–0.04)
IMM GRANULOCYTES NFR BLD AUTO: 1.1 % (ref 0–0.5)
LYMPHOCYTES # BLD AUTO: 0.8 K/UL (ref 1–4.8)
LYMPHOCYTES NFR BLD: 14.3 % (ref 18–48)
MAGNESIUM SERPL-MCNC: 1.7 MG/DL (ref 1.6–2.6)
MCH RBC QN AUTO: 23.9 PG (ref 27–31)
MCHC RBC AUTO-ENTMCNC: 30.5 G/DL (ref 32–36)
MCV RBC AUTO: 79 FL (ref 82–98)
MONOCYTES # BLD AUTO: 1.5 K/UL (ref 0.3–1)
MONOCYTES NFR BLD: 27.7 % (ref 4–15)
NEUTROPHILS # BLD AUTO: 2.9 K/UL (ref 1.8–7.7)
NEUTROPHILS NFR BLD: 55.5 % (ref 38–73)
NRBC BLD-RTO: 0 /100 WBC
PHOSPHATE SERPL-MCNC: 3.4 MG/DL (ref 2.7–4.5)
PLATELET # BLD AUTO: 116 K/UL (ref 150–450)
PMV BLD AUTO: ABNORMAL FL (ref 9.2–12.9)
POTASSIUM SERPL-SCNC: 4.7 MMOL/L (ref 3.5–5.1)
RBC # BLD AUTO: 4.18 M/UL (ref 4.6–6.2)
SODIUM SERPL-SCNC: 135 MMOL/L (ref 136–145)
TACROLIMUS BLD-MCNC: 10.1 NG/ML (ref 5–15)
WBC # BLD AUTO: 5.23 K/UL (ref 3.9–12.7)

## 2022-11-11 PROCEDURE — 83036 HEMOGLOBIN GLYCOSYLATED A1C: CPT | Performed by: INTERNAL MEDICINE

## 2022-11-11 PROCEDURE — 85025 COMPLETE CBC W/AUTO DIFF WBC: CPT | Performed by: INTERNAL MEDICINE

## 2022-11-11 PROCEDURE — 80197 ASSAY OF TACROLIMUS: CPT | Performed by: INTERNAL MEDICINE

## 2022-11-11 PROCEDURE — 84153 ASSAY OF PSA TOTAL: CPT | Performed by: INTERNAL MEDICINE

## 2022-11-11 PROCEDURE — 36415 COLL VENOUS BLD VENIPUNCTURE: CPT | Performed by: INTERNAL MEDICINE

## 2022-11-11 PROCEDURE — 83735 ASSAY OF MAGNESIUM: CPT | Performed by: INTERNAL MEDICINE

## 2022-11-11 PROCEDURE — 80069 RENAL FUNCTION PANEL: CPT | Performed by: INTERNAL MEDICINE

## 2022-11-11 NOTE — PROGRESS NOTES
Please call pt. Prostate screening lab is elevated at 8. I would like him to get a urine sample to rule out infection, recheck PSA and have him see urology for further evaluation.  A1c is improving but still at little above goal at 7.3% please continue current regimen and keep working on diet.   Urine protein is elevated. This may be related to the prostate level being elevated. Will follow up with repeat testing and urology evaluation.

## 2022-11-11 NOTE — TELEPHONE ENCOUNTER
----- Message from Bola Sosa MD sent at 11/11/2022 10:30 AM CST -----  Prograf level is now double up to 10, and I did not make any change with the dose . Did his doctor made a change any new med? Is this a true trough level?

## 2022-11-11 NOTE — TELEPHONE ENCOUNTER
----- Message from Mima Mack MD sent at 11/11/2022  1:14 PM CST -----  Please call pt. Prostate screening lab is elevated at 8. I would like him to get a urine sample to rule out infection, recheck PSA and have him see urology for further evaluation.  A1c is improving but still at little above goal at 7.3% please continue current regimen and keep working on diet.   Urine protein is elevated. This may be related to the prostate level being elevated. Will follow up with repeat testing and urology evaluation.

## 2022-11-11 NOTE — PROGRESS NOTES
Prograf level is now double up to 10, and I did not make any change with the dose . Did his doctor made a change any new med? Is this a true trough level?

## 2022-11-12 ENCOUNTER — LAB VISIT (OUTPATIENT)
Dept: LAB | Facility: HOSPITAL | Age: 67
End: 2022-11-12
Attending: INTERNAL MEDICINE
Payer: MEDICARE

## 2022-11-12 DIAGNOSIS — R97.20 ELEVATED PSA: ICD-10-CM

## 2022-11-12 LAB — COMPLEXED PSA SERPL-MCNC: 5.5 NG/ML (ref 0–4)

## 2022-11-12 PROCEDURE — 36415 COLL VENOUS BLD VENIPUNCTURE: CPT | Performed by: INTERNAL MEDICINE

## 2022-11-12 PROCEDURE — 84153 ASSAY OF PSA TOTAL: CPT | Performed by: INTERNAL MEDICINE

## 2022-11-15 NOTE — PROGRESS NOTES
Kidney Post-Transplant Assessment    Referring Physician: Alexi Glasgow  Current Nephrologist: Olayinka Ewing    ORGAN: RIGHT KIDNEY  Donor Type: donation after brain death  PHS Increased Risk: yes  Cold Ischemia: 314 mins  Induction Medications: thymoglobulin    Subjective:     CC:  Reassessment of renal allograft function and management of chronic immunosuppression.    HPI:  Mr. Zamorano is a 67 y.o. 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  He has CKD stage 2 - GFR 60-89 and his baseline creatinine is between 1-1.4. He takes mycophenolate mofetil, prednisone and tacrolimus for maintenance immunosuppression. He denies any recent hospitalizations or ER visits since his previous clinic visit.    Hospitalization/ ED visits  COVID 7/2022    Interval HX:  Reports doing well. Water intake currently about 2-3 sherwood a day. Reports intermittent hypertension. Has been having his PCP manage. Last seen  8/2022 and has upcoming appointment. Has appointment with Urology for elevated PSA 8.3 and has Cardiologist appointment 1/2023.       Current Outpatient Medications:     apixaban (ELIQUIS) 5 mg Tab, Take 1 tablet (5 mg total) by mouth 2 (two) times daily., Disp: 60 tablet, Rfl: 11    aspirin 81 MG Chew, Take 81 mg by mouth once daily., Disp: , Rfl:     atorvastatin (LIPITOR) 40 MG tablet, Take 1 tablet (40 mg total) by mouth once daily., Disp: 90 tablet, Rfl: 3    blood sugar diagnostic (ONETOUCH ULTRA TEST) Strp, USE 1 STRIP TO CHECK BLOOD GLUCOSE FOUR TIMES DAILY, Disp: 400 strip, Rfl: 3    carvediloL (COREG) 25 MG tablet, Take 1 tablet (25 mg total) by mouth 2 (two) times daily., Disp: 180 tablet, Rfl: 3    chlorthalidone (HYGROTEN) 25 MG Tab, Take 2 tablets (50 mg total) by mouth once daily., Disp: 90 tablet, Rfl: 3    famotidine (PEPCID) 20 MG tablet, Take 1 tablet (20 mg total) by mouth every evening., Disp: 90 tablet, Rfl: 3     "ferrous sulfate (SLOW FE) 142 mg (45 mg iron) TbSR, Take 1 tablet (142 mg total) by mouth Daily., Disp: 90 tablet, Rfl: 3    flecainide (TAMBOCOR) 100 MG Tab, Take 1 tablet (100 mg total) by mouth every 12 (twelve) hours., Disp: 60 tablet, Rfl: 0    gabapentin (NEURONTIN) 300 MG capsule, Take 1 capsule by mouth in the morning, 1 capsule midday, and 3 capsules at night., Disp: 450 capsule, Rfl: 3    hydrALAZINE (APRESOLINE) 25 MG tablet, Take 1 tablet (25 mg total) by mouth every 12 (twelve) hours., Disp: 60 tablet, Rfl: 11    insulin (LANTUS SOLOSTAR U-100 INSULIN) glargine 100 units/mL (3mL) SubQ pen, Inject 35 units subcutaneously at night (Patient taking differently: Inject 35 units subcutaneously at night), Disp: 12 mL, Rfl: 6    insulin lispro (HUMALOG KWIKPEN INSULIN) 100 unit/mL pen, Inject 18 units w/ breakfast, 16 units w/ lunch and dinner plus scale 150-200 +2, 201-250 +4, 251-300 +6, 301-350 +8., Disp: 30 mL, Rfl: 4    lancets (ONETOUCH DELICA PLUS LANCET) 33 gauge Misc, USE TO CHECK BLOOD GLUCOSE FOUR TIMES DAILY, Disp: 400 each, Rfl: 3    meclizine (ANTIVERT) 25 mg tablet, Take 1 tablet (25 mg total) by mouth 3 (three) times daily as needed for Dizziness., Disp: 21 tablet, Rfl: 0    melatonin 5 mg Tab, Take 1 tablet (5 mg total) by mouth every evening., Disp: , Rfl:     mycophenolate (CELLCEPT) 250 mg Cap, Take 4 capsules (1,000 mg total) by mouth 2 (two) times daily., Disp: 240 capsule, Rfl: 11    pen needle, diabetic (BD ULTRA-FINE LO PEN NEEDLE) 32 gauge x 5/32" Ndle, USE TO ADMINISTER INSULIN 4 TIMES DAILY., Disp: 400 each, Rfl: 3    predniSONE (DELTASONE) 5 MG tablet, Take 1 tablet (5 mg total) by mouth once daily., Disp: 30 tablet, Rfl: 11    semaglutide (OZEMPIC) 0.25 mg or 0.5 mg(2 mg/1.5 mL) pen injector, Inject 0.5 mg weekly., Disp: 3 pen, Rfl: 3    spironolactone (ALDACTONE) 25 MG tablet, Take 1 tablet (25 mg total) by mouth once daily., Disp: 90 tablet, Rfl: 1    tacrolimus (PROGRAF) 0.5 " MG Cap, Take 1 capsule (0.5 mg total) by mouth every 12 (twelve) hours., Disp: 60 capsule, Rfl: 11    valsartan (DIOVAN) 320 MG tablet, Take 1 tablet (320 mg total) by mouth once daily., Disp: 90 tablet, Rfl: 3      Past Medical History:   Diagnosis Date    Anxiety 7/29/2014    Arthritis     Bilateral diabetic retinopathy 2017    CKD (chronic kidney disease) stage 2, GFR 60-89 ml/min 12/28/2016    CKD (chronic kidney disease) stage 4, GFR 15-29 ml/min 7/29/2014    Colon polyps 2014    Depression - situational 7/29/2014    Diabetes mellitus     Diabetes type 2 since 2000 7/29/2014    Diabetic neuropathy 7/29/2014    History of cardioversion 10/3/2019    History of hepatitis C, s/p successful Rx w/ SVR24 - 9/2017 7/29/2014    Genotype 1a, treatment naive 10/2014 liver biopsy - grade 1 / stage 1 Completed Harvoni w/ SVR    Hyperlipidemia 7/29/2014    Hypertension     Neuropathy     Organ transplant candidate 7/29/2014    Pancreatitis     S/P cadaveric kidney transplant 11/5/2016. ESRD secondary to HTN/DMII 11/5/2016    Stage 3a chronic kidney disease 12/28/2016    Type 2 diabetes mellitus with stage 3a chronic kidney disease, with long-term current use of insulin 7/29/2014       Review of Systems   Constitutional:  Negative for appetite change, chills, fatigue and fever.   HENT:  Negative for trouble swallowing.    Respiratory:  Negative for cough, chest tightness, shortness of breath and wheezing.    Cardiovascular:  Negative for chest pain, palpitations and leg swelling.   Gastrointestinal:  Negative for abdominal pain, constipation, diarrhea and nausea.   Genitourinary:  Negative for difficulty urinating, frequency and urgency.        + frothy urine   Musculoskeletal:  Negative for arthralgias and myalgias.   Skin:  Negative for rash.   Neurological:  Positive for tremors (mild). Negative for dizziness, weakness, light-headedness and headaches.   Psychiatric/Behavioral:  Negative for sleep disturbance.   "    Objective:   Blood pressure (!) 111/58, pulse (!) 48, temperature 97.7 °F (36.5 °C), temperature source Temporal, resp. rate 18, height 6' 1" (1.854 m), weight 91.9 kg (202 lb 9.6 oz), SpO2 99 %.body mass index is 26.73 kg/m².    Physical Exam  Constitutional:       General: He is not in acute distress.     Appearance: He is well-developed. He is not diaphoretic.   Cardiovascular:      Rate and Rhythm: Normal rate and regular rhythm.      Heart sounds: Normal heart sounds. No murmur heard.    No friction rub. No gallop.   Pulmonary:      Effort: Pulmonary effort is normal. No respiratory distress.      Breath sounds: Normal breath sounds. No wheezing or rales.   Abdominal:      General: Bowel sounds are normal. There is no distension.      Palpations: Abdomen is soft.      Tenderness: There is no abdominal tenderness.   Musculoskeletal:         General: No tenderness. Normal range of motion.   Skin:     General: Skin is warm and dry.      Findings: No rash.      Nails: There is no clubbing.   Neurological:      Mental Status: He is alert and oriented to person, place, and time.   Psychiatric:         Behavior: Behavior normal.       Labs:  Lab Results   Component Value Date    WBC 5.23 11/11/2022    HGB 10.0 (L) 11/11/2022    HCT 32.8 (L) 11/11/2022     (L) 11/11/2022    K 4.7 11/11/2022     11/11/2022    CO2 24 11/11/2022    BUN 29 (H) 11/11/2022    CREATININE 1.7 (H) 11/11/2022    EGFRNONAA 44.9 (A) 07/19/2022    CALCIUM 9.3 11/11/2022    PHOS 3.4 11/11/2022    MG 1.7 11/11/2022    ALBUMIN 3.4 (L) 11/11/2022    AST 14 07/12/2022    ALT 6 (L) 07/12/2022       No results found for: EXTANC, EXTWBC, EXTSEGS, EXTPLATELETS, EXTHEMOGLOBI, EXTHEMATOCRI, EXTCREATININ, EXTSODIUM, EXTPOTASSIUM, EXTBUN, EXTCO2, EXTCALCIUM, EXTPHOSPHORU, EXTGLUCOSE, EXTALBUMIN, EXTAST, EXTALT, EXTBILITOTAL, EXTLIPASE, EXTAMYLASE    No results found for: EXTCYCLOSLVL, EXTSIROLIMUS, EXTTACROLVL, EXTPROTCRE, EXTPTHINTACT, " EXTPROTEINUA, EXTWBCUA, EXTRBCUA    Labs were reviewed with the patient.    Assessment:     1. S/P cadaveric kidney transplant 11/5/2016. ESRD secondary to HTN/DMII    2. Stage 3a chronic kidney disease    3. Hypertension, unspecified type    4. Hyperlipidemia, unspecified hyperlipidemia type    5. Type 2 diabetes mellitus with stage 3a chronic kidney disease, with long-term current use of insulin        Plan:   Continue to follow with PCP and General Nephrologist  Proteinuria. Patient to follow with Dr. Willis on management, next appointment 12/1/22   PSA 8.3--needs to follow with urology--Has appointment on 11/21/22   A1C-- needs DM management  Has upcoming appointment with Cardiology for Afib, on afib coreg flecainide  Needs repeat Tac level      1. CKD stage 2: will continue follow up as per our center guidelines. patient to continue close follow up with the local General nephrologist. Education provided in appropriate fluid intake, potassium intake. Continue with oral hydration.      2. Immunosuppression: Prograf trough 10.1, which is therapeutic target 4-7. Continue Prograf 1/0.5, MMF 1000 Mg BID, and Prednisone 5 mg QD  Lab Results   Component Value Date    TACROLIMUS 10.1 11/11/2022    TACROLIMUS 5.0 08/24/2022    TACROLIMUS 5.7 07/19/2022   Will closely monitor for toxicities, education provided about adherence to medicines and need to communicate any side effect to the transplant nurse or physician.      3. Allograft Function:stable at baseline for the patient. Continue follow up as per our guidelines and with the local General nephrologist. Communication will be sent today.      11/11/2022  6yr 0mo   Creatinine 0.5 - 1.4 mg/dL 1.7 (A)   eGFR >60 mL/min/1.73 m^2 44.2 (A)   Glucose 70 - 110 mg/dL 113 (A)     Lab Results   Component Value Date    HGBA1C 7.3 (H) 11/11/2022   Takes long and short acting insulin --deferred management to PCP/Endocrinologist      4. Hypertension management:  Continue with home  blood pressure monitoring, low salt and healthy life discussed with the patient.  BP Readings from Last 3 Encounters:   11/18/22 (!) 111/58   10/27/22 138/60   10/10/22 (!) 144/66   takes coreg, nifedipine, valsartan --deferred management to General Nephrology      5. Metabolic Bone Disease/Secondary Hyperparathyroidism:calcium and phosphorus level discussed with the patient, patient will continue follow up with the general nephrologist for management of metabolic bone disease  calcium and phosphorus as per our center protocol. Will monitor PTH, Vit D level, calcium.   Lab Results   Component Value Date    .8 (H) 07/12/2022    CALCIUM 9.3 11/11/2022    CAION 1.11 11/06/2016    PHOS 3.4 11/11/2022    PHOS 2.8 10/13/2022    PHOS 3.3 08/24/2022       6. Electrolytes: reviewed with the patient, essentially within the normal range no need for acute changes today, will monitor as per our center guidelines.     11/11/2022  6yr 0mo   Magnesium 1.6 - 2.6 mg/dL 1.7     Lab Results   Component Value Date     (L) 11/11/2022    K 4.7 11/11/2022     11/11/2022    CO2 24 11/11/2022    CO2 26 10/13/2022    CO2 26 10/13/2022       7. Anemia: will continue monitoring as per our center guidelines. No indication for acute intervention today.  Lab Results   Component Value Date    WBC 5.23 11/11/2022    HGB 10.0 (L) 11/11/2022    HCT 32.8 (L) 11/11/2022    MCV 79 (L) 11/11/2022     (L) 11/11/2022       8.Proteinuria: will continue with pr/cr ratio as per our center guidelines  Lab Results   Component Value Date    PROTEINURINE 103 (H) 11/11/2022    CREATRANDUR 100.0 11/11/2022    CREATRANDUR 100.0 11/11/2022    CREATRANDUR 100.0 11/11/2022    UTPCR 1.03 (H) 11/11/2022    UTPCR 1.35 (H) 10/13/2022    UTPCR 2.32 (H) 08/24/2022   Proteinuria management deferred to General Nephrologist      9. BK virus infection screening: will continue with urine or blood PCR as per our guidelines to prevent BK virus viremia and  allograft dysfunction  Lab Results   Component Value Date    BKVIRUSPCRQB <125 11/09/2021         10. Weight education: provided during the clinic visit.   Body mass index is 26.73 kg/m².       11.Patient safety education regarding immunosuppression including prophylaxis posttransplant for CMV, PCP : Education provided about vaccination and prevention of infections.    12.  Cytopenias: no significant cytopenias will monitor as per our guidelines. Medicine list reviewed including potential causes of drug-induced cytopenias  Lab Results   Component Value Date    WBC 5.23 11/11/2022    HGB 10.0 (L) 11/11/2022    HCT 32.8 (L) 11/11/2022    MCV 79 (L) 11/11/2022     (L) 11/11/2022       Follow-up:   Annual follow-up with kidney transplant clinic with repeat labs, including renal function panel with UA, urine protein/creatinine ratio, and drug trough level q3 months.  Patient also reminded to follow-up with general nephrologist.    Labs: since patient remains at high risk for rejection and drug-related complications that warrant close monitoring, labs will be ordered as follows: continue twice weekly CBC, renal panel, and drug level for first month; then same labs once weekly through 3rd month post-transplant.  Urine for UA and protein/creatinine ratio monthly.  Serum BK - PCR at 1-, 3-, 6-, 9-, 12-, 18-, 24-, 36-, 48-, and 60 months post-transplant.  Hepatic panel at 1-, 2-, 3-, 6-, 9-, 12-, 18-, 24-, and 36- months post-transplant.    Education:   Material provided to the patient.  Patient reminded to call with any health changes since these can be early signs of significant complications.  Also, I advised the patient to be sure any new medications or changes of old medications are discussed with either a pharmacist or physician knowledgeable with transplant to avoid rejection/drug toxicity related to significant drug interactions.    Exercise: reminded Ravi of the importance of regular exercise for weight  management, blood sugar and blood pressure management.  I also explained exercise has been shown to improve cardiovascular health, energy level, and sleep hygiene.  Lastly, I advised him that cardiovascular complications are leading cause of death for renal transplant recipients, and regular exercise can help lower this risk.    I spoke with the patient for 30 minutes. More than half dedicated to counseling and education. All questions answered    Emilia Abreu, NP-C  Transplant Nephrology      UN[facility] Patient Status  Functional Status: 90% - Able to carry on normal activity: minor symptoms of disease  Physical Capacity: No Limitations

## 2022-11-16 DIAGNOSIS — Z94.0 KIDNEY REPLACED BY TRANSPLANT: ICD-10-CM

## 2022-11-16 RX ORDER — PREDNISONE 5 MG/1
5 TABLET ORAL DAILY
Qty: 30 TABLET | Refills: 11 | Status: SHIPPED | OUTPATIENT
Start: 2022-11-16 | End: 2023-11-20 | Stop reason: SDUPTHER

## 2022-11-16 RX ORDER — MYCOPHENOLATE MOFETIL 250 MG/1
1000 CAPSULE ORAL 2 TIMES DAILY
Qty: 240 CAPSULE | Refills: 11 | Status: SHIPPED | OUTPATIENT
Start: 2022-11-16 | End: 2023-11-20 | Stop reason: SDUPTHER

## 2022-11-16 NOTE — PROGRESS NOTES
Please let pt know urine did not show UTI. Psa 5.5 (initial reading was 8.3.)   Please keep upcoming urology appt.

## 2022-11-17 ENCOUNTER — TELEPHONE (OUTPATIENT)
Dept: INTERNAL MEDICINE | Facility: CLINIC | Age: 67
End: 2022-11-17
Payer: MEDICARE

## 2022-11-17 NOTE — TELEPHONE ENCOUNTER
----- Message from Mima Mack MD sent at 11/16/2022  5:32 PM CST -----  Please let pt know urine did not show UTI. Psa 5.5 (initial reading was 8.3.)   Please keep upcoming urology appt.

## 2022-11-17 NOTE — TELEPHONE ENCOUNTER
----- Message from Mima Mack MD sent at 11/16/2022  5:31 PM CST -----  Please let pt know urine did not show UTI. Psa 5.5 (initial reading was 8.3.)   Please keep upcoming urology appt.

## 2022-11-18 ENCOUNTER — OFFICE VISIT (OUTPATIENT)
Dept: TRANSPLANT | Facility: CLINIC | Age: 67
End: 2022-11-18
Payer: MEDICARE

## 2022-11-18 VITALS
HEART RATE: 48 BPM | BODY MASS INDEX: 26.85 KG/M2 | TEMPERATURE: 98 F | OXYGEN SATURATION: 99 % | RESPIRATION RATE: 18 BRPM | WEIGHT: 202.63 LBS | SYSTOLIC BLOOD PRESSURE: 111 MMHG | DIASTOLIC BLOOD PRESSURE: 58 MMHG | HEIGHT: 73 IN

## 2022-11-18 DIAGNOSIS — E78.5 HYPERLIPIDEMIA, UNSPECIFIED HYPERLIPIDEMIA TYPE: ICD-10-CM

## 2022-11-18 DIAGNOSIS — I10 HYPERTENSION, UNSPECIFIED TYPE: ICD-10-CM

## 2022-11-18 DIAGNOSIS — N18.31 TYPE 2 DIABETES MELLITUS WITH STAGE 3A CHRONIC KIDNEY DISEASE, WITH LONG-TERM CURRENT USE OF INSULIN: ICD-10-CM

## 2022-11-18 DIAGNOSIS — N18.31 STAGE 3A CHRONIC KIDNEY DISEASE: ICD-10-CM

## 2022-11-18 DIAGNOSIS — E11.22 TYPE 2 DIABETES MELLITUS WITH STAGE 3A CHRONIC KIDNEY DISEASE, WITH LONG-TERM CURRENT USE OF INSULIN: ICD-10-CM

## 2022-11-18 DIAGNOSIS — Z94.0 S/P KIDNEY TRANSPLANT: Primary | ICD-10-CM

## 2022-11-18 DIAGNOSIS — Z79.4 TYPE 2 DIABETES MELLITUS WITH STAGE 3A CHRONIC KIDNEY DISEASE, WITH LONG-TERM CURRENT USE OF INSULIN: ICD-10-CM

## 2022-11-18 DIAGNOSIS — Z94.0 KIDNEY REPLACED BY TRANSPLANT: ICD-10-CM

## 2022-11-18 PROCEDURE — 99999 PR PBB SHADOW E&M-EST. PATIENT-LVL V: ICD-10-PCS | Mod: PBBFAC,,, | Performed by: NURSE PRACTITIONER

## 2022-11-18 PROCEDURE — 99499 UNLISTED E&M SERVICE: CPT | Mod: S$PBB,,, | Performed by: NURSE PRACTITIONER

## 2022-11-18 PROCEDURE — 99215 OFFICE O/P EST HI 40 MIN: CPT | Mod: S$PBB,,, | Performed by: NURSE PRACTITIONER

## 2022-11-18 PROCEDURE — 99999 PR PBB SHADOW E&M-EST. PATIENT-LVL V: CPT | Mod: PBBFAC,,, | Performed by: NURSE PRACTITIONER

## 2022-11-18 PROCEDURE — 99215 PR OFFICE/OUTPT VISIT, EST, LEVL V, 40-54 MIN: ICD-10-PCS | Mod: S$PBB,,, | Performed by: NURSE PRACTITIONER

## 2022-11-18 PROCEDURE — 99215 OFFICE O/P EST HI 40 MIN: CPT | Mod: PBBFAC | Performed by: NURSE PRACTITIONER

## 2022-11-18 PROCEDURE — 99499 RISK ADDL DX/OHS AUDIT: ICD-10-PCS | Mod: S$PBB,,, | Performed by: NURSE PRACTITIONER

## 2022-11-18 RX ORDER — TACROLIMUS 0.5 MG/1
0.5 CAPSULE ORAL EVERY 12 HOURS
Qty: 60 CAPSULE | Refills: 11 | Status: SHIPPED | OUTPATIENT
Start: 2022-11-18 | End: 2023-11-20 | Stop reason: SDUPTHER

## 2022-11-18 NOTE — Clinical Note
November 18, 2022        Olayinka Ewing  [address]  [facility] [address]  Phone: [phone]  Fax: [phone]             [facility] 1st Fl  [address]  [facility] [address]  Phone: [phone]   Patient: Ravi Zamorano   MR Number: [id number]   YOB: 1955   Date of Visit: 11/18/2022       Dear Dr. Olayinka Ewing    Thank you for referring Ravi Zamorano to me for evaluation. Attached you will find relevant portions of my assessment and plan of care.    If you have questions, please do not hesitate to call me. I look forward to following Ravi Zamorano along with you.    Sincerely,    Emilia Abreu NP    Enclosure    If you would like to receive this communication electronically, please contact [email or web address] or [phone] to request [employer] access.    [employer] is a tool which provides read-only access to select patient information with whom you have a relationship. Its easy to use and provides real time access to review your patients record including encounter summaries, notes, results, and demographic information.    If you feel you have received this communication in error or would no longer like to receive these types of communications, please e-mail [email or web address]

## 2022-11-21 ENCOUNTER — OFFICE VISIT (OUTPATIENT)
Dept: UROLOGY | Facility: CLINIC | Age: 67
End: 2022-11-21
Payer: MEDICARE

## 2022-11-21 ENCOUNTER — LAB VISIT (OUTPATIENT)
Dept: LAB | Facility: HOSPITAL | Age: 67
End: 2022-11-21
Attending: UROLOGY
Payer: MEDICARE

## 2022-11-21 VITALS
WEIGHT: 196.19 LBS | HEIGHT: 73 IN | SYSTOLIC BLOOD PRESSURE: 137 MMHG | DIASTOLIC BLOOD PRESSURE: 63 MMHG | BODY MASS INDEX: 26 KG/M2 | HEART RATE: 57 BPM

## 2022-11-21 DIAGNOSIS — R80.9 MICROALBUMINURIA: ICD-10-CM

## 2022-11-21 DIAGNOSIS — R97.20 ELEVATED PSA: ICD-10-CM

## 2022-11-21 LAB
CREAT SERPL-MCNC: 1.9 MG/DL (ref 0.5–1.4)
EST. GFR  (NO RACE VARIABLE): 38.7 ML/MIN/1.73 M^2

## 2022-11-21 PROCEDURE — 99999 PR PBB SHADOW E&M-EST. PATIENT-LVL V: CPT | Mod: PBBFAC,,, | Performed by: UROLOGY

## 2022-11-21 PROCEDURE — 99999 PR PBB SHADOW E&M-EST. PATIENT-LVL V: ICD-10-PCS | Mod: PBBFAC,,, | Performed by: UROLOGY

## 2022-11-21 PROCEDURE — 99213 PR OFFICE/OUTPT VISIT, EST, LEVL III, 20-29 MIN: ICD-10-PCS | Mod: S$PBB,,, | Performed by: UROLOGY

## 2022-11-21 PROCEDURE — 99213 OFFICE O/P EST LOW 20 MIN: CPT | Mod: S$PBB,,, | Performed by: UROLOGY

## 2022-11-21 PROCEDURE — 99215 OFFICE O/P EST HI 40 MIN: CPT | Mod: PBBFAC | Performed by: UROLOGY

## 2022-11-21 PROCEDURE — 82565 ASSAY OF CREATININE: CPT | Performed by: UROLOGY

## 2022-11-21 PROCEDURE — 36415 COLL VENOUS BLD VENIPUNCTURE: CPT | Performed by: UROLOGY

## 2022-11-21 NOTE — PROGRESS NOTES
Subjective:       Patient ID: Ravi Zamorano is a 67 y.o. male.    Chief Complaint: No chief complaint on file.    HPI patient with an elevated PSA of 5.5.  Voiding well.  Status post kidney transplant.  Creatinine 1.7.  Needs an MRI of prostate    Past Medical History:   Diagnosis Date    Anxiety 7/29/2014    Arthritis     Bilateral diabetic retinopathy 2017    CKD (chronic kidney disease) stage 2, GFR 60-89 ml/min 12/28/2016    CKD (chronic kidney disease) stage 4, GFR 15-29 ml/min 7/29/2014    Colon polyps 2014    Depression - situational 7/29/2014    Diabetes mellitus     Diabetes type 2 since 2000 7/29/2014    Diabetic neuropathy 7/29/2014    History of cardioversion 10/3/2019    History of hepatitis C, s/p successful Rx w/ SVR24 - 9/2017 7/29/2014    Genotype 1a, treatment naive 10/2014 liver biopsy - grade 1 / stage 1 Completed Harvoni w/ SVR    Hyperlipidemia 7/29/2014    Hypertension     Neuropathy     Organ transplant candidate 7/29/2014    Pancreatitis     S/P cadaveric kidney transplant 11/5/2016. ESRD secondary to HTN/DMII 11/5/2016    Stage 3a chronic kidney disease 12/28/2016    Type 2 diabetes mellitus with stage 3a chronic kidney disease, with long-term current use of insulin 7/29/2014       Past Surgical History:   Procedure Laterality Date    APPENDECTOMY      COLONOSCOPY N/A 12/21/2020    Procedure: COLONOSCOPY;  Surgeon: Tico Bell MD;  Location: [facility] ENDO ([address] [place] [address]);  Service: Endoscopy;  Laterality: N/A;  ok to hold eliquis per Dr. Valadez, see telephone encounter 11/13/2020-MS  covid test 12/18 [place]    KIDNEY TRANSPLANT      TRANSESOPHAGEAL ECHOCARDIOGRAPHY N/A 8/26/2019    Procedure: ECHOCARDIOGRAM, TRANSESOPHAGEAL;  Surgeon: Dolores Diagnostic Provider;  Location: [facility] EP LAB;  Service: Cardiology;  Laterality: N/A;    TREATMENT OF CARDIAC ARRHYTHMIA N/A 8/26/2019    Procedure: CARDIOVERSION;  Surgeon: Bronson Bowden MD;  Location: [facility] EP LAB;  Service: Cardiology;  Laterality: N/A;   AF, FELICIANO, DCCV, MAC, GP, 3 PREP       Family History   Problem Relation Age of Onset    Diabetes Mother     Hypertension Mother     Heart disease Mother         CAD with PCI    Heart disease Father     Cancer Brother         one sister with breast cancer    Hypertension Brother         one sister with HTN and borderline DM    Stroke Neg Hx     Kidney disease Neg Hx        Social History     Socioeconomic History    Marital status:    Occupational History     Employer: [employer]   Tobacco Use    Smoking status: Former     Packs/day: 0.50     Years: 32.00     Pack years: 16.00     Types: Cigarettes     Quit date: 2016     Years since quittin.9    Smokeless tobacco: Never    Tobacco comments:     Pt reports that he quit in , but started up again in . pt reports he is currently working on quitting again   Substance and Sexual Activity    Alcohol use: Yes     Comment: Pt reports occasional beers on Sundays. Pt reports drinking daily prior to ESRD diagnosis.    Drug use: No    Sexual activity: Yes     Partners: Female   Social History Narrative     for 14 years     for 29 years    2 children ages 35, 36       Allergies:  Nifedipine    Medications:    Current Outpatient Medications:     apixaban (ELIQUIS) 5 mg Tab, Take 1 tablet (5 mg total) by mouth 2 (two) times daily., Disp: 60 tablet, Rfl: 11    aspirin 81 MG Chew, Take 81 mg by mouth once daily., Disp: , Rfl:     atorvastatin (LIPITOR) 40 MG tablet, Take 1 tablet (40 mg total) by mouth once daily., Disp: 90 tablet, Rfl: 3    blood sugar diagnostic (ONETOUCH ULTRA TEST) Strp, USE 1 STRIP TO CHECK BLOOD GLUCOSE FOUR TIMES DAILY, Disp: 400 strip, Rfl: 3    carvediloL (COREG) 25 MG tablet, Take 1 tablet (25 mg total) by mouth 2 (two) times daily., Disp: 180 tablet, Rfl: 3    chlorthalidone (HYGROTEN) 25 MG Tab, Take 2 tablets (50 mg total) by mouth once daily., Disp: 90 tablet, Rfl: 3    famotidine (PEPCID) 20 MG  "tablet, Take 1 tablet (20 mg total) by mouth every evening., Disp: 90 tablet, Rfl: 3    ferrous sulfate (SLOW FE) 142 mg (45 mg iron) TbSR, Take 1 tablet (142 mg total) by mouth Daily., Disp: 90 tablet, Rfl: 3    flecainide (TAMBOCOR) 100 MG Tab, Take 1 tablet (100 mg total) by mouth every 12 (twelve) hours., Disp: 60 tablet, Rfl: 0    gabapentin (NEURONTIN) 300 MG capsule, Take 1 capsule by mouth in the morning, 1 capsule midday, and 3 capsules at night., Disp: 450 capsule, Rfl: 3    hydrALAZINE (APRESOLINE) 25 MG tablet, Take 1 tablet (25 mg total) by mouth every 12 (twelve) hours., Disp: 60 tablet, Rfl: 11    insulin (LANTUS SOLOSTAR U-100 INSULIN) glargine 100 units/mL (3mL) SubQ pen, Inject 35 units subcutaneously at night (Patient taking differently: Inject 35 units subcutaneously at night), Disp: 12 mL, Rfl: 6    insulin lispro (HUMALOG KWIKPEN INSULIN) 100 unit/mL pen, Inject 18 units w/ breakfast, 16 units w/ lunch and dinner plus scale 150-200 +2, 201-250 +4, 251-300 +6, 301-350 +8., Disp: 30 mL, Rfl: 4    lancets (ONETOUCH DELICA PLUS LANCET) 33 gauge Misc, USE TO CHECK BLOOD GLUCOSE FOUR TIMES DAILY, Disp: 400 each, Rfl: 3    meclizine (ANTIVERT) 25 mg tablet, Take 1 tablet (25 mg total) by mouth 3 (three) times daily as needed for Dizziness., Disp: 21 tablet, Rfl: 0    melatonin 5 mg Tab, Take 1 tablet (5 mg total) by mouth every evening., Disp: , Rfl:     mycophenolate (CELLCEPT) 250 mg Cap, Take 4 capsules (1,000 mg total) by mouth 2 (two) times daily., Disp: 240 capsule, Rfl: 11    pen needle, diabetic (BD ULTRA-FINE LO PEN NEEDLE) 32 gauge x 5/32" Ndle, USE TO ADMINISTER INSULIN 4 TIMES DAILY., Disp: 400 each, Rfl: 3    predniSONE (DELTASONE) 5 MG tablet, Take 1 tablet (5 mg total) by mouth once daily., Disp: 30 tablet, Rfl: 11    semaglutide (OZEMPIC) 0.25 mg or 0.5 mg(2 mg/1.5 mL) pen injector, Inject 0.5 mg weekly., Disp: 3 pen, Rfl: 3    spironolactone (ALDACTONE) 25 MG tablet, Take 1 tablet " (25 mg total) by mouth once daily., Disp: 90 tablet, Rfl: 1    tacrolimus (PROGRAF) 0.5 MG Cap, Take 1 capsule (0.5 mg total) by mouth every 12 (twelve) hours., Disp: 60 capsule, Rfl: 11    tacrolimus (PROGRAF) 0.5 MG Cap, Take 1 capsule (0.5 mg total) by mouth every 12 (twelve) hours., Disp: 60 capsule, Rfl: 11    valsartan (DIOVAN) 320 MG tablet, Take 1 tablet (320 mg total) by mouth once daily., Disp: 90 tablet, Rfl: 3    Review of Systems   Constitutional:  Negative for activity change, appetite change, chills, diaphoresis, fatigue, fever and unexpected weight change.   HENT:  Negative for congestion, dental problem, hearing loss, mouth sores, postnasal drip, rhinorrhea, sinus pressure and trouble swallowing.    Eyes:  Negative for pain, discharge and itching.   Respiratory:  Negative for apnea, cough, choking, chest tightness, shortness of breath and wheezing.    Cardiovascular:  Negative for chest pain, palpitations and leg swelling.   Gastrointestinal:  Negative for abdominal distention, abdominal pain, anal bleeding, blood in stool, constipation, diarrhea, nausea, rectal pain and vomiting.   Endocrine: Negative for polydipsia and polyuria.   Genitourinary:  Negative for decreased urine volume, difficulty urinating, dysuria, enuresis, flank pain, frequency, genital sores, hematuria, penile discharge, penile pain, penile swelling, scrotal swelling, testicular pain and urgency.   Musculoskeletal:  Negative for arthralgias, back pain and myalgias.   Skin:  Negative for color change, rash and wound.   Neurological:  Negative for dizziness, syncope, speech difficulty, light-headedness and headaches.   Hematological:  Negative for adenopathy. Does not bruise/bleed easily.   Psychiatric/Behavioral:  Negative for behavioral problems, confusion, hallucinations and sleep disturbance.      Objective:      Physical Exam  Constitutional:       Appearance: He is well-developed.   HENT:      Head: Normocephalic.    Cardiovascular:      Rate and Rhythm: Normal rate.   Pulmonary:      Effort: Pulmonary effort is normal.   Abdominal:      Palpations: Abdomen is soft.   Genitourinary:     Prostate: Normal.      Comments: 2530 g benign no nodules  Skin:     General: Skin is warm.   Neurological:      Mental Status: He is alert.       Assessment:       1. Elevated PSA    2. Microalbuminuria          Plan:       Diagnoses and all orders for this visit:    Elevated PSA  -     Ambulatory referral/consult to Urology  -     MRI Prostate W W/O Contrast; Future  -     Creatinine, Serum; Future    Microalbuminuria  -     Ambulatory referral/consult to Urology

## 2022-11-22 DIAGNOSIS — Z79.4 TYPE 2 DIABETES MELLITUS WITH STAGE 3A CHRONIC KIDNEY DISEASE, WITH LONG-TERM CURRENT USE OF INSULIN: ICD-10-CM

## 2022-11-22 DIAGNOSIS — E11.22 TYPE 2 DIABETES MELLITUS WITH STAGE 3A CHRONIC KIDNEY DISEASE, WITH LONG-TERM CURRENT USE OF INSULIN: ICD-10-CM

## 2022-11-22 DIAGNOSIS — N18.31 TYPE 2 DIABETES MELLITUS WITH STAGE 3A CHRONIC KIDNEY DISEASE, WITH LONG-TERM CURRENT USE OF INSULIN: ICD-10-CM

## 2022-11-22 DIAGNOSIS — Z94.0 S/P KIDNEY TRANSPLANT: Primary | ICD-10-CM

## 2022-11-29 ENCOUNTER — LAB VISIT (OUTPATIENT)
Dept: LAB | Facility: HOSPITAL | Age: 67
End: 2022-11-29
Attending: INTERNAL MEDICINE
Payer: MEDICARE

## 2022-11-29 DIAGNOSIS — Z94.0 KIDNEY REPLACED BY TRANSPLANT: ICD-10-CM

## 2022-11-29 LAB — TACROLIMUS BLD-MCNC: 4.5 NG/ML (ref 5–15)

## 2022-11-29 PROCEDURE — 80197 ASSAY OF TACROLIMUS: CPT | Performed by: INTERNAL MEDICINE

## 2022-11-29 PROCEDURE — 36415 COLL VENOUS BLD VENIPUNCTURE: CPT | Performed by: INTERNAL MEDICINE

## 2022-12-01 ENCOUNTER — OFFICE VISIT (OUTPATIENT)
Dept: NEPHROLOGY | Facility: CLINIC | Age: 67
End: 2022-12-01
Payer: MEDICARE

## 2022-12-01 VITALS
SYSTOLIC BLOOD PRESSURE: 120 MMHG | BODY MASS INDEX: 26.47 KG/M2 | HEART RATE: 65 BPM | WEIGHT: 200.63 LBS | OXYGEN SATURATION: 99 % | DIASTOLIC BLOOD PRESSURE: 62 MMHG

## 2022-12-01 DIAGNOSIS — I48.19 PERSISTENT ATRIAL FIBRILLATION: ICD-10-CM

## 2022-12-01 DIAGNOSIS — N18.32 STAGE 3B CHRONIC KIDNEY DISEASE: Primary | ICD-10-CM

## 2022-12-01 DIAGNOSIS — Z94.0 S/P KIDNEY TRANSPLANT: ICD-10-CM

## 2022-12-01 DIAGNOSIS — I50.32 CHRONIC DIASTOLIC CONGESTIVE HEART FAILURE: ICD-10-CM

## 2022-12-01 DIAGNOSIS — Z79.4 TYPE 2 DIABETES MELLITUS WITH STAGE 3A CHRONIC KIDNEY DISEASE, WITH LONG-TERM CURRENT USE OF INSULIN: ICD-10-CM

## 2022-12-01 DIAGNOSIS — D63.8 ANEMIA OF CHRONIC DISEASE: ICD-10-CM

## 2022-12-01 DIAGNOSIS — E11.42 DIABETIC POLYNEUROPATHY ASSOCIATED WITH TYPE 2 DIABETES MELLITUS: ICD-10-CM

## 2022-12-01 DIAGNOSIS — I10 HYPERTENSION, UNSPECIFIED TYPE: ICD-10-CM

## 2022-12-01 DIAGNOSIS — N18.31 TYPE 2 DIABETES MELLITUS WITH STAGE 3A CHRONIC KIDNEY DISEASE, WITH LONG-TERM CURRENT USE OF INSULIN: ICD-10-CM

## 2022-12-01 DIAGNOSIS — E11.22 TYPE 2 DIABETES MELLITUS WITH STAGE 3A CHRONIC KIDNEY DISEASE, WITH LONG-TERM CURRENT USE OF INSULIN: ICD-10-CM

## 2022-12-01 PROCEDURE — 99215 PR OFFICE/OUTPT VISIT, EST, LEVL V, 40-54 MIN: ICD-10-PCS | Mod: HCNC,GC,S$GLB, | Performed by: STUDENT IN AN ORGANIZED HEALTH CARE EDUCATION/TRAINING PROGRAM

## 2022-12-01 PROCEDURE — 99214 OFFICE O/P EST MOD 30 MIN: CPT | Mod: PBBFAC | Performed by: STUDENT IN AN ORGANIZED HEALTH CARE EDUCATION/TRAINING PROGRAM

## 2022-12-01 PROCEDURE — 99999 PR PBB SHADOW E&M-EST. PATIENT-LVL IV: CPT | Mod: PBBFAC,HCNC,GC, | Performed by: STUDENT IN AN ORGANIZED HEALTH CARE EDUCATION/TRAINING PROGRAM

## 2022-12-01 PROCEDURE — 99215 OFFICE O/P EST HI 40 MIN: CPT | Mod: HCNC,GC,S$GLB, | Performed by: STUDENT IN AN ORGANIZED HEALTH CARE EDUCATION/TRAINING PROGRAM

## 2022-12-01 PROCEDURE — 99999 PR PBB SHADOW E&M-EST. PATIENT-LVL IV: ICD-10-PCS | Mod: PBBFAC,HCNC,GC, | Performed by: STUDENT IN AN ORGANIZED HEALTH CARE EDUCATION/TRAINING PROGRAM

## 2022-12-01 NOTE — Clinical Note
Hi! Saw pt and he reports not taking semaglutide for >6 months, he would like to know what dose to restart medication, would you kindly review with pt. Also, pt is on a higher dose of gabapentin than I would like him to be 1200mg/day, he has tried to cut back but reports too much pain. I think he would benefit from vascular surgery referral for LE vascular studies. Thank you!

## 2022-12-01 NOTE — ASSESSMENT & PLAN NOTE
Needs f/u with cardiology for review of all cardiac medications.   As of 12/2022 these are the medications he is taking: chlorthalidone 25mg once daily, carvedilol 25mg bid, spironolactone 25mg daily, valsartan 320mg daily, hydralazine 25 mg every 12 hours  Plan:  -continue chlorthalidone 25mg daily, carvedilol 25mg BID, Valsartan 320mg daily & hydralazine 25mg Q12H  -will consider changing hydralazine to nifedipine on f/u   -pt to review with his cardiology the need for spironolactone   -pt to log blood pressure readings and bring to his next appt

## 2022-12-01 NOTE — ASSESSMENT & PLAN NOTE
Transplant medications: Tacrolimus 0.5mg twice daily, Prednisone 5mg daily, and Mycophenolate 250mg 4 caps twice daily     Plan:   Check tacrolimus level as per transplant nephrology protocols

## 2022-12-01 NOTE — ASSESSMENT & PLAN NOTE
- Results for orders placed during the hospital encounter of 07/10/22    Echo Saline Bubble? No    Interpretation Summary  · The left ventricle is normal in size with eccentric hypertrophy and normal systolic function. The estimated ejection fraction is 65%.  · Normal right ventricular size with normal right ventricular systolic function.  · Left ventricular diastolic dysfunction.  · Biatrial enlargement.  · Mild aortic regurgitation.  · PA systolic pressure is at elast 39 mmHg. The IVC is not visualized.    Plan:  Cardiology and EP appts next month

## 2022-12-01 NOTE — ASSESSMENT & PLAN NOTE
Post-transplant CDK likely related to chronic CNI therapy, HTN, and diabetes   Baseline sCr  1.6-1.9  8/25 sCr 1.7     Plan  Decrease gabapentin 300mg TID   -Mineral & Bone Disorder:   Phosphorus level: 3.3 PTH level 129 repeat labs on f/u   -CKD anemia: hgb 11.9, goal hgb 10-11g/dL, repeat iron studies and will assess need for iron infusions and for ESAs  -continue valsartan 320 mg daily   -Tight control of diabetes (A1C <7) and HTN (<130/80)  -When possible avoid nephrotoxins (NSAIDS, long term use of PPIs, IV contrast/Gadolinium exposure)

## 2022-12-01 NOTE — ASSESSMENT & PLAN NOTE
Needs f/u with cardiology for review of all cardiac medications.   As of 8/2022 these are the medications he is taking: carvedilol 25mg bid, spironolactone, valsartan 320mg daily, hydralazine 100mg every 8 hours

## 2022-12-01 NOTE — PROGRESS NOTES
Nephrology Clinic Note   2022    Chief Complaint   Patient presents with    Chronic Kidney Disease    Kidney Transplant        History of present illness:  Patient is a 67 y.o. 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     Presents to the clinic today for f/u on CKD    Mr. Zamorano's last appointment was on 2022  Since then, pt reports no hospitalizations, injuries, or changes in his urinary function  He reports improvement in his blood pressure measurements: SBPs 110's-140's,   Has been off iron tabs for a couple of months and reports changes in his blood pressure medication:   Hydralazine now is 25mg twice daily            Kidney transplant ()  Donor Type:   - Brain Death  Donor CMV Status:  Positive  Donor HBcAB:  Negative  Donor HCV Status:  Positive  Transplant nephrologist: Dr. Sosa   Transplant medications: Tacrolimus 0.5mg twice daily, Prednisone 5mg daily, and Mycophenolate 250mg 4 caps twice daily   Last Tacrolimus level was 4.5 on 2022 at 0800    CKD 3b  Post-kidney transplant CKD   Meds:  valsartan 320mg daily    DM II   Pt has not been taking semaglutide >6 months   He has severe neuropathy and unable to cut back on the dose of gabapentin, he is taking 300mg capsule in the morning and 3 capsules at night    Meds: Humalog & lantus insulin    HTN/afib/CAD   pt measures blood pressure  Meds: eliquis 5mg daily, asa 81mg daily, atorvastatin 40mg daily, carvedilol 25mg bid, chlorthalidone 25mg daily, spironolactone 25mg daily, valsartan 320mg daily, hydralazine 25mg every 12 hours      Pt denies: Fever, chills, shortness of breath, chest pain, palpitations, nausea, vomiting, diarrhea, abd  pain, hematuria, dysuria, urinary frequency or urgency, headaches, visual disturbances or weakness.      ROS as per HPI    History:  Past Medical History:   Diagnosis Date    Anxiety 7/29/2014    Arthritis     Bilateral diabetic retinopathy 2017    CKD (chronic kidney disease) stage 2, GFR 60-89 ml/min 12/28/2016    CKD (chronic kidney disease) stage 4, GFR 15-29 ml/min 7/29/2014    Colon polyps 2014    Depression - situational 7/29/2014    Diabetes mellitus     Diabetes type 2 since 2000 7/29/2014    Diabetic neuropathy 7/29/2014    History of cardioversion 10/3/2019    History of hepatitis C, s/p successful Rx w/ SVR24 - 9/2017 7/29/2014    Genotype 1a, treatment naive 10/2014 liver biopsy - grade 1 / stage 1 Completed Harvoni w/ SVR    Hyperlipidemia 7/29/2014    Hypertension     Neuropathy     Organ transplant candidate 7/29/2014    Pancreatitis     S/P cadaveric kidney transplant 11/5/2016. ESRD secondary to HTN/DMII 11/5/2016    Stage 3a chronic kidney disease 12/28/2016    Type 2 diabetes mellitus with stage 3a chronic kidney disease, with long-term current use of insulin 7/29/2014      Past Surgical History:   Procedure Laterality Date    APPENDECTOMY      COLONOSCOPY N/A 12/21/2020    Procedure: COLONOSCOPY;  Surgeon: Tico Bell MD;  Location: [facility] ENDO ([address] [place] [address]);  Service: Endoscopy;  Laterality: N/A;  ok to hold eliquis per Dr. Valadez, see telephone encounter 11/13/2020-MS  covid test 12/18 [facility]    KIDNEY TRANSPLANT      TRANSESOPHAGEAL ECHOCARDIOGRAPHY N/A 8/26/2019    Procedure: ECHOCARDIOGRAM, TRANSESOPHAGEAL;  Surgeon: Jesus Diagnostic Provider;  Location: [facility] EP LAB;  Service: Cardiology;  Laterality: N/A;    TREATMENT OF CARDIAC ARRHYTHMIA N/A 8/26/2019    Procedure: CARDIOVERSION;  Surgeon: Bronson Bowden MD;  Location: [facility] EP LAB;  Service: Cardiology;  Laterality: N/A;  AF, FELICIANO, DCCV, MAC, GP, 3 PREP        Current Outpatient Medications:     apixaban (ELIQUIS) 5 mg Tab, Take 1 tablet (5  mg total) by mouth 2 (two) times daily., Disp: 60 tablet, Rfl: 11    aspirin 81 MG Chew, Take 81 mg by mouth once daily., Disp: , Rfl:     atorvastatin (LIPITOR) 40 MG tablet, Take 1 tablet (40 mg total) by mouth once daily., Disp: 90 tablet, Rfl: 3    blood sugar diagnostic (ONETOUCH ULTRA TEST) Strp, USE 1 STRIP TO CHECK BLOOD GLUCOSE FOUR TIMES DAILY, Disp: 400 strip, Rfl: 3    carvediloL (COREG) 25 MG tablet, Take 1 tablet (25 mg total) by mouth 2 (two) times daily., Disp: 180 tablet, Rfl: 3    chlorthalidone (HYGROTEN) 25 MG Tab, Take 2 tablets (50 mg total) by mouth once daily., Disp: 90 tablet, Rfl: 3    famotidine (PEPCID) 20 MG tablet, Take 1 tablet (20 mg total) by mouth every evening., Disp: 90 tablet, Rfl: 3    flecainide (TAMBOCOR) 100 MG Tab, Take 1 tablet (100 mg total) by mouth every 12 (twelve) hours., Disp: 60 tablet, Rfl: 0    gabapentin (NEURONTIN) 300 MG capsule, Take 1 capsule by mouth in the morning, 1 capsule midday, and 3 capsules at night., Disp: 450 capsule, Rfl: 3    hydrALAZINE (APRESOLINE) 25 MG tablet, Take 1 tablet (25 mg total) by mouth every 12 (twelve) hours., Disp: 60 tablet, Rfl: 11    insulin (LANTUS SOLOSTAR U-100 INSULIN) glargine 100 units/mL (3mL) SubQ pen, Inject 35 units subcutaneously at night (Patient taking differently: Inject 35 units subcutaneously at night), Disp: 12 mL, Rfl: 6    insulin lispro (HUMALOG KWIKPEN INSULIN) 100 unit/mL pen, Inject 18 units w/ breakfast, 16 units w/ lunch and dinner plus scale 150-200 +2, 201-250 +4, 251-300 +6, 301-350 +8., Disp: 30 mL, Rfl: 4    lancets (ONETOUCH DELICA PLUS LANCET) 33 gauge Misc, USE TO CHECK BLOOD GLUCOSE FOUR TIMES DAILY, Disp: 400 each, Rfl: 3    meclizine (ANTIVERT) 25 mg tablet, Take 1 tablet (25 mg total) by mouth 3 (three) times daily as needed for Dizziness., Disp: 21 tablet, Rfl: 0    melatonin 5 mg Tab, Take 1 tablet (5 mg total) by mouth every evening., Disp: , Rfl:     mycophenolate (CELLCEPT) 250 mg Cap,  "Take 4 capsules (1,000 mg total) by mouth 2 (two) times daily., Disp: 240 capsule, Rfl: 11    pen needle, diabetic (BD ULTRA-FINE LO PEN NEEDLE) 32 gauge x 5/32" Ndle, USE TO ADMINISTER INSULIN 4 TIMES DAILY., Disp: 400 each, Rfl: 3    predniSONE (DELTASONE) 5 MG tablet, Take 1 tablet (5 mg total) by mouth once daily., Disp: 30 tablet, Rfl: 11    spironolactone (ALDACTONE) 25 MG tablet, Take 1 tablet (25 mg total) by mouth once daily., Disp: 90 tablet, Rfl: 1    tacrolimus (PROGRAF) 0.5 MG Cap, Take 1 capsule (0.5 mg total) by mouth every 12 (twelve) hours., Disp: 60 capsule, Rfl: 11    tacrolimus (PROGRAF) 0.5 MG Cap, Take 1 capsule (0.5 mg total) by mouth every 12 (twelve) hours., Disp: 60 capsule, Rfl: 11    valsartan (DIOVAN) 320 MG tablet, Take 1 tablet (320 mg total) by mouth once daily., Disp: 90 tablet, Rfl: 3    ferrous sulfate (SLOW FE) 142 mg (45 mg iron) TbSR, Take 1 tablet (142 mg total) by mouth Daily., Disp: 90 tablet, Rfl: 3    semaglutide (OZEMPIC) 0.25 mg or 0.5 mg(2 mg/1.5 mL) pen injector, Inject 0.5 mg weekly., Disp: 3 pen, Rfl: 3  Review of patient's allergies indicates:   Allergen Reactions    Nifedipine Other (See Comments)     Gingival hyperplasia      Social History     Tobacco Use    Smoking status: Former     Packs/day: 0.50     Years: 32.00     Pack years: 16.00     Types: Cigarettes     Quit date: 2016     Years since quittin.0    Smokeless tobacco: Never    Tobacco comments:     Pt reports that he quit in , but started up again in . pt reports he is currently working on quitting again   Substance Use Topics    Alcohol use: Yes     Comment: Pt reports occasional beers on Sundays. Pt reports drinking daily prior to ESRD diagnosis.      Family History   Problem Relation Age of Onset    Diabetes Mother     Hypertension Mother     Heart disease Mother         CAD with PCI    Heart disease Father     Cancer Brother         one sister with breast cancer    Hypertension " Brother         one sister with HTN and borderline DM    Stroke Neg Hx     Kidney disease Neg Hx         Physical Exam :  Vitals:    12/01/22 1306   BP: 120/62   Pulse: 65     Physical Exam  Vitals and nursing note reviewed.   Constitutional:       General: He is not in acute distress.     Appearance: Normal appearance. He is not ill-appearing, toxic-appearing or diaphoretic.   HENT:      Mouth/Throat:      Mouth: Mucous membranes are moist.   Cardiovascular:      Rate and Rhythm: Normal rate and regular rhythm.      Pulses: Normal pulses.      Heart sounds: Normal heart sounds. No murmur heard.  Pulmonary:      Effort: Pulmonary effort is normal. No respiratory distress.      Breath sounds: Normal breath sounds. No stridor. No wheezing, rhonchi or rales.   Musculoskeletal:         General: Swelling present. No tenderness, deformity or signs of injury.      Right lower leg: Edema present.      Left lower leg: Edema present.   Skin:     General: Skin is warm.      Capillary Refill: Capillary refill takes less than 2 seconds.      Coloration: Skin is not jaundiced or pale.      Findings: No bruising, erythema, lesion or rash.   Neurological:      Mental Status: He is alert and oriented to person, place, and time.   Psychiatric:         Mood and Affect: Mood normal.         Behavior: Behavior normal.         Thought Content: Thought content normal.         Judgment: Judgment normal.       Labs reviewed   Images Reviewed    Assessment:    1. Stage 3b chronic kidney disease    2. Hypertension, unspecified type    3. S/P cadaveric kidney transplant 11/5/2016. ESRD secondary to HTN/DMII    4. Anemia of chronic disease    5. Chronic diastolic congestive heart failure    6. Diabetic polyneuropathy associated with type 2 diabetes mellitus    7. Persistent atrial fibrillation    8. Type 2 diabetes mellitus with stage 3a chronic kidney disease, with long-term current use of insulin          Plan:    Chronic diastolic congestive  heart failure  - Results for orders placed during the hospital encounter of 07/10/22    Echo Saline Bubble? No    Interpretation Summary  · The left ventricle is normal in size with eccentric hypertrophy and normal systolic function. The estimated ejection fraction is 65%.  · Normal right ventricular size with normal right ventricular systolic function.  · Left ventricular diastolic dysfunction.  · Biatrial enlargement.  · Mild aortic regurgitation.  · PA systolic pressure is at elast 39 mmHg. The IVC is not visualized.    Plan:  Cardiology and EP appts next month     Hypertension since 2000  Needs f/u with cardiology for review of all cardiac medications.   As of 12/2022 these are the medications he is taking: chlorthalidone 25mg once daily, carvedilol 25mg bid, spironolactone 25mg daily, valsartan 320mg daily, hydralazine 25 mg every 12 hours  Plan:  -continue chlorthalidone 25mg daily, carvedilol 25mg BID, Valsartan 320mg daily & hydralazine 25mg Q12H  -will consider changing hydralazine to nifedipine on f/u   -pt to review with his cardiology the need for spironolactone   -pt to log blood pressure readings and bring to his next appt     S/P cadaveric kidney transplant 11/5/2016. ESRD secondary to HTN/DMII  Recent Labs   Lab 08/24/22  0735 11/11/22  0802 11/29/22  0800   Tacrolimus Lvl 5.0 10.1 4.5 L     Plan:   -cont Tacrolimus 0.5mg twice daily, Prednisone 5mg daily, and Mycophenolate 250mg 4 caps twice daily   -tacrolimus level next month   -Monitor for side effects and toxicities, given narrow therapeutic window and significant risk of AE      Diabetic polyneuropathy associated with type 2 diabetes mellitus  Pt is on high dose gabapentin for neuropathic pain 300mg in the morning and 900mg at night.  Plan:   -will message PCP for referral to vascular surgery for evaluation of LE circulation   -as CKD progresses the dose for gabapentin should be decreased     Persistent atrial fibrillation  Pt on eliquis 5mg  BID  F/u with cardioloy next month     Stage 3b chronic kidney disease  Post-transplant CDK likely related to chronic CNI therapy, HTN, and diabetes   Baseline sCr  1.6-1.9  8/25 sCr 1.7     Plan  -stable renal function and improved blood pressure control  -Decrease gabapentin 300mg TID   -Mineral & Bone Disorder:  Phosphorus level: 3.3 PTH level 129 repeat labs on f/u   -CKD anemia: hgb 11.9, goal hgb 10-11g/dL, repeat iron studies and will assess need for iron infusions and for ESAs  -continue valsartan 320 mg daily   -Tight control of diabetes (A1C <7) and HTN (<130/80)  -When possible avoid nephrotoxins (NSAIDS, long term use of PPIs, IV contrast/Gadolinium exposure)      Type 2 diabetes mellitus with stage 3a chronic kidney disease, with long-term current use of insulin  Lab Results   Component Value Date    HGBA1C 7.3 (H) 11/11/2022     Plan:   -will message pt's PCP re: dose for semaglutide in order for pt to restart    Follow up in about 3 months (around 3/1/2023).     Orders Placed This Encounter   Procedures    CBC Auto Differential    Iron and TIBC    Reticulocytes    Ferritin    PTH, Intact    Calcitriol (1,25 DI-OH Vitamin D)    Renal Function Panel    Tacrolimus Level       There are no discontinued medications.     Future Appointments   Date Time Provider Department Center   12/23/2022 12:00 PM [facility] [id number] [facility]   1/4/2023  9:40 AM LAB, APPOINTMENT [facility]   1/23/2023 10:00 AM EKG, APPT [facility] EKG [facility]   1/23/2023 10:40 AM Bronson Bowden MD [facility] ARRHYTH [facility]

## 2022-12-01 NOTE — ASSESSMENT & PLAN NOTE
Recent Labs   Lab 08/24/22  0735 11/11/22  0802 11/29/22  0800   Tacrolimus Lvl 5.0 10.1 4.5 L     Plan:   -cont Tacrolimus 0.5mg twice daily, Prednisone 5mg daily, and Mycophenolate 250mg 4 caps twice daily   -tacrolimus level next month   -Monitor for side effects and toxicities, given narrow therapeutic window and significant risk of AE

## 2022-12-05 NOTE — PROGRESS NOTES
I have reviewed the notes, assessments, and/or procedures performed by the fellow, I concur with her/his documentation of Ravi Zamorano.       LUCIO DEL VALLE.Becka. MD. SUSANA. EZIO.  , [employer] [facility] [employer]  Nephrology Consultant. [facility].   [facility]. 5th [place] [address].    email: [email or web address].  Tel: Office: [phone]

## 2022-12-05 NOTE — ASSESSMENT & PLAN NOTE
Pt is on high dose gabapentin for neuropathic pain 300mg in the morning and 900mg at night.  Plan:   -will message PCP for referral to vascular surgery for evaluation of LE circulation   -as CKD progresses the dose for gabapentin should be decreased

## 2022-12-05 NOTE — ASSESSMENT & PLAN NOTE
Lab Results   Component Value Date    HGBA1C 7.3 (H) 11/11/2022     Plan:   -will message pt's PCP re: dose for semaglutide in order for pt to restart

## 2022-12-05 NOTE — ASSESSMENT & PLAN NOTE
Post-transplant CDK likely related to chronic CNI therapy, HTN, and diabetes   Baseline sCr  1.6-1.9  8/25 sCr 1.7     Plan  -stable renal function and improved blood pressure control  -Decrease gabapentin 300mg TID   -Mineral & Bone Disorder:   Phosphorus level: 3.3 PTH level 129 repeat labs on f/u   -CKD anemia: hgb 11.9, goal hgb 10-11g/dL, repeat iron studies and will assess need for iron infusions and for ESAs  -continue valsartan 320 mg daily   -Tight control of diabetes (A1C <7) and HTN (<130/80)  -When possible avoid nephrotoxins (NSAIDS, long term use of PPIs, IV contrast/Gadolinium exposure)

## 2022-12-06 ENCOUNTER — TELEPHONE (OUTPATIENT)
Dept: TRANSPLANT | Facility: CLINIC | Age: 67
End: 2022-12-06
Payer: MEDICARE

## 2022-12-06 NOTE — PROGRESS NOTES
He is on 2 diuretics (aldactone and chlorthalidone) and valsartan. His creatinine is elevated with proteinuria compared with with last year, the meds and diabetic nephropathy could explain the increased creatinine. With low allosure x 2. I do not think rejection is a big player but  that can't be ruled out completely. Let's get a repeat allosure.     Suggest BP control <130/80  Wt management  Low salt diet   DM management  Wt management

## 2022-12-06 NOTE — TELEPHONE ENCOUNTER
----- Message from Bola Sosa MD sent at 12/6/2022  2:13 PM CST -----  He is on 2 diuretics (aldactone and chlorthalidone) and valsartan. His creatinine is elevated with proteinuria compared with with last year, the meds and diabetic nephropathy could explain the increased creatinine. With low allosure x 2. I do not think rejection is a big player but  that can't be ruled out completely. Let's get a repeat allosure.     Suggest BP control <130/80  Wt management  Low salt diet   DM management  Wt management

## 2022-12-07 ENCOUNTER — TELEPHONE (OUTPATIENT)
Dept: INTERNAL MEDICINE | Facility: CLINIC | Age: 67
End: 2022-12-07
Payer: MEDICARE

## 2022-12-07 DIAGNOSIS — M79.605 PAIN IN BOTH LOWER EXTREMITIES: Primary | ICD-10-CM

## 2022-12-07 DIAGNOSIS — E11.22 TYPE 2 DIABETES MELLITUS WITH STAGE 3A CHRONIC KIDNEY DISEASE, WITH LONG-TERM CURRENT USE OF INSULIN: ICD-10-CM

## 2022-12-07 DIAGNOSIS — Z79.4 TYPE 2 DIABETES MELLITUS WITH STAGE 3A CHRONIC KIDNEY DISEASE, WITH LONG-TERM CURRENT USE OF INSULIN: ICD-10-CM

## 2022-12-07 DIAGNOSIS — N18.31 TYPE 2 DIABETES MELLITUS WITH STAGE 3A CHRONIC KIDNEY DISEASE, WITH LONG-TERM CURRENT USE OF INSULIN: ICD-10-CM

## 2022-12-07 DIAGNOSIS — M79.604 PAIN IN BOTH LOWER EXTREMITIES: Primary | ICD-10-CM

## 2022-12-07 RX ORDER — SEMAGLUTIDE 1.34 MG/ML
INJECTION, SOLUTION SUBCUTANEOUS
Qty: 3 PEN | Refills: 3 | Status: SHIPPED | OUTPATIENT
Start: 2022-12-07 | End: 2023-01-04

## 2022-12-07 NOTE — TELEPHONE ENCOUNTER
----- Message from Fanny Willis MD sent at 12/4/2022 10:20 PM CST -----  Hi! Saw pt and he reports not taking semaglutide for >6 months, he would like to know what dose to restart medication, would you kindly review with pt. Also, pt is on a higher dose of gabapentin than I would like him to be 1200mg/day, he has tried to cut back but reports too much pain. I think he would benefit from vascular surgery referral for LE vascular studies. Thank you!

## 2022-12-07 NOTE — TELEPHONE ENCOUNTER
He can restart the ozempic at 0.25mg weekly for 4 weeks then increase to 0.5mg weekly. New rx sent.    I have placed a referral for vascular study (WENDI) to evaluate LE circulation - please help schedule.

## 2022-12-08 RX ORDER — FLECAINIDE ACETATE 100 MG/1
100 TABLET ORAL EVERY 12 HOURS
Qty: 60 TABLET | Refills: 0 | Status: SHIPPED | OUTPATIENT
Start: 2022-12-08 | End: 2023-01-02 | Stop reason: SDUPTHER

## 2022-12-09 ENCOUNTER — HOSPITAL ENCOUNTER (OUTPATIENT)
Dept: CARDIOLOGY | Facility: HOSPITAL | Age: 67
Discharge: HOME | End: 2022-12-09
Attending: INTERNAL MEDICINE
Payer: MEDICARE

## 2022-12-09 ENCOUNTER — TELEPHONE (OUTPATIENT)
Dept: INTERNAL MEDICINE | Facility: CLINIC | Age: 67
End: 2022-12-09
Payer: MEDICARE

## 2022-12-09 DIAGNOSIS — M79.605 PAIN IN BOTH LOWER EXTREMITIES: ICD-10-CM

## 2022-12-09 DIAGNOSIS — M79.604 PAIN IN BOTH LOWER EXTREMITIES: ICD-10-CM

## 2022-12-09 DIAGNOSIS — I73.9 PERIPHERAL VASCULAR DISEASE: Primary | ICD-10-CM

## 2022-12-09 LAB
LEFT ABI: 0.71
LEFT ARM BP: 167 MMHG
LEFT DORSALIS PEDIS: 99 MMHG
LEFT POSTERIOR TIBIAL: 118 MMHG
RIGHT ABI: 0.69
RIGHT ARM BP: 161 MMHG
RIGHT DORSALIS PEDIS: 105 MMHG
RIGHT POSTERIOR TIBIAL: 115 MMHG

## 2022-12-09 PROCEDURE — 93922 UPR/L XTREMITY ART 2 LEVELS: CPT | Mod: 26,HCNC,, | Performed by: INTERNAL MEDICINE

## 2022-12-09 PROCEDURE — 93922 UPR/L XTREMITY ART 2 LEVELS: CPT | Mod: HCNC

## 2022-12-09 PROCEDURE — 93922 ANKLE BRACHIAL INDICES (ABI): ICD-10-PCS | Mod: 26,HCNC,, | Performed by: INTERNAL MEDICINE

## 2022-12-09 NOTE — TELEPHONE ENCOUNTER
----- Message from Mima Mack MD sent at 12/9/2022  3:40 PM CST -----  Please call pt. Your WNEDI test showed that you do have peripheral vascular disease and it is worse on the right leg. I would like you to follow up with vascular surgery for further evaluation. Please help schedule.  Sincerely,  Mima Mack MD

## 2022-12-09 NOTE — PROGRESS NOTES
Please call pt. Your WENDI test showed that you do have peripheral vascular disease and it is worse on the right leg. I would like you to follow up with vascular surgery for further evaluation. Please help schedule.  Sincerely,  Mima Mack MD

## 2022-12-12 ENCOUNTER — TELEPHONE (OUTPATIENT)
Dept: INTERNAL MEDICINE | Facility: CLINIC | Age: 67
End: 2022-12-12
Payer: MEDICARE

## 2022-12-23 ENCOUNTER — HOSPITAL ENCOUNTER (OUTPATIENT)
Dept: RADIOLOGY | Facility: HOSPITAL | Age: 67
Discharge: HOME | End: 2022-12-23
Attending: UROLOGY
Payer: MEDICARE

## 2022-12-23 DIAGNOSIS — R97.20 ELEVATED PSA: ICD-10-CM

## 2022-12-23 PROCEDURE — 72197 MRI PROSTATE W W/O CONTRAST: ICD-10-PCS | Mod: 26,,, | Performed by: RADIOLOGY

## 2022-12-23 PROCEDURE — 72197 MRI PELVIS W/O & W/DYE: CPT | Mod: 26,,, | Performed by: RADIOLOGY

## 2022-12-23 PROCEDURE — 25500020 PHARM REV CODE 255: Performed by: UROLOGY

## 2022-12-23 PROCEDURE — A9585 GADOBUTROL INJECTION: HCPCS | Performed by: UROLOGY

## 2022-12-23 PROCEDURE — 72197 MRI PELVIS W/O & W/DYE: CPT | Mod: TC

## 2022-12-23 RX ORDER — GADOBUTROL 604.72 MG/ML
10 INJECTION INTRAVENOUS
Status: COMPLETED | OUTPATIENT
Start: 2022-12-23 | End: 2022-12-23

## 2022-12-23 RX ADMIN — GADOBUTROL 10 ML: 604.72 INJECTION INTRAVENOUS at 12:12

## 2023-01-02 DIAGNOSIS — E78.5 HYPERLIPIDEMIA, UNSPECIFIED HYPERLIPIDEMIA TYPE: ICD-10-CM

## 2023-01-02 RX ORDER — INSULIN ASPART 100 [IU]/ML
INJECTION, SOLUTION INTRAVENOUS; SUBCUTANEOUS
Qty: 30 ML | Refills: 4 | Status: SHIPPED | OUTPATIENT
Start: 2023-01-02 | End: 2023-02-22

## 2023-01-02 NOTE — TELEPHONE ENCOUNTER
Care Due:                  Date            Visit Type   Department     Provider  --------------------------------------------------------------------------------                                EP -                              PRIMARY      [facility] INTERNAL  Last Visit: 10-      CARE ([facility])   MEDICINE       CARRIE BUSTILLOS  Next Visit: None Scheduled  None         None Found                                                            Last  Test          Frequency    Reason                     Performed    Due Date  --------------------------------------------------------------------------------    Lipid Panel.  12 months..  atorvastatin.............  06- 06-    [facility] Embedded Care Gaps. Reference number: [id number]. 1/02/2023   1:06:42 PM CST

## 2023-01-03 RX ORDER — FLECAINIDE ACETATE 100 MG/1
100 TABLET ORAL EVERY 12 HOURS
Qty: 60 TABLET | Refills: 1 | Status: SHIPPED | OUTPATIENT
Start: 2023-01-03 | End: 2023-02-22 | Stop reason: SDUPTHER

## 2023-01-03 NOTE — TELEPHONE ENCOUNTER
Refill Routing Note   Medication(s) are not appropriate for processing by [facility] for the following reason(s):      - Required laboratory values are outdated    [facility] action(s):  Defer Medication-related problems identified: Requires labs        Medication reconciliation completed: No     Appointments  past 12m or future 3m with PCP    Date Provider   Last Visit   10/27/2022 Mima Mack MD   Next Visit   Visit date not found Mima Mack MD   ED visits in past 90 days: 0        Note composed:3:59 PM 01/03/2023

## 2023-01-04 ENCOUNTER — OFFICE VISIT (OUTPATIENT)
Dept: ENDOCRINOLOGY | Facility: CLINIC | Age: 68
End: 2023-01-04
Payer: MEDICARE

## 2023-01-04 ENCOUNTER — LAB VISIT (OUTPATIENT)
Dept: LAB | Facility: HOSPITAL | Age: 68
End: 2023-01-04
Payer: MEDICARE

## 2023-01-04 ENCOUNTER — TELEPHONE (OUTPATIENT)
Dept: UROLOGY | Facility: CLINIC | Age: 68
End: 2023-01-04
Payer: MEDICARE

## 2023-01-04 VITALS
HEIGHT: 73 IN | DIASTOLIC BLOOD PRESSURE: 80 MMHG | BODY MASS INDEX: 26.47 KG/M2 | OXYGEN SATURATION: 99 % | HEART RATE: 53 BPM | WEIGHT: 199.75 LBS | SYSTOLIC BLOOD PRESSURE: 178 MMHG

## 2023-01-04 DIAGNOSIS — Z79.4 TYPE 2 DIABETES MELLITUS WITH STAGE 3A CHRONIC KIDNEY DISEASE, WITH LONG-TERM CURRENT USE OF INSULIN: Primary | ICD-10-CM

## 2023-01-04 DIAGNOSIS — I10 HYPERTENSION, UNSPECIFIED TYPE: ICD-10-CM

## 2023-01-04 DIAGNOSIS — E78.5 HYPERLIPIDEMIA, UNSPECIFIED HYPERLIPIDEMIA TYPE: ICD-10-CM

## 2023-01-04 DIAGNOSIS — Z94.0 S/P KIDNEY TRANSPLANT: ICD-10-CM

## 2023-01-04 DIAGNOSIS — E11.3393 TYPE 2 DIABETES MELLITUS WITH BOTH EYES AFFECTED BY MODERATE NONPROLIFERATIVE RETINOPATHY WITHOUT MACULAR EDEMA, WITHOUT LONG-TERM CURRENT USE OF INSULIN: ICD-10-CM

## 2023-01-04 DIAGNOSIS — I50.32 CHRONIC DIASTOLIC CONGESTIVE HEART FAILURE: ICD-10-CM

## 2023-01-04 DIAGNOSIS — E11.22 TYPE 2 DIABETES MELLITUS WITH STAGE 3A CHRONIC KIDNEY DISEASE, WITH LONG-TERM CURRENT USE OF INSULIN: Primary | ICD-10-CM

## 2023-01-04 DIAGNOSIS — N18.31 TYPE 2 DIABETES MELLITUS WITH STAGE 3A CHRONIC KIDNEY DISEASE, WITH LONG-TERM CURRENT USE OF INSULIN: Primary | ICD-10-CM

## 2023-01-04 DIAGNOSIS — N18.32 STAGE 3B CHRONIC KIDNEY DISEASE: ICD-10-CM

## 2023-01-04 DIAGNOSIS — N25.81 HYPERPARATHYROIDISM DUE TO RENAL INSUFFICIENCY: ICD-10-CM

## 2023-01-04 LAB — TACROLIMUS BLD-MCNC: 5.2 NG/ML (ref 5–15)

## 2023-01-04 PROCEDURE — 99499 RISK ADDL DX/OHS AUDIT: ICD-10-PCS | Mod: S$PBB,,, | Performed by: INTERNAL MEDICINE

## 2023-01-04 PROCEDURE — 80197 ASSAY OF TACROLIMUS: CPT | Performed by: STUDENT IN AN ORGANIZED HEALTH CARE EDUCATION/TRAINING PROGRAM

## 2023-01-04 PROCEDURE — 36415 COLL VENOUS BLD VENIPUNCTURE: CPT | Performed by: STUDENT IN AN ORGANIZED HEALTH CARE EDUCATION/TRAINING PROGRAM

## 2023-01-04 PROCEDURE — 99499 UNLISTED E&M SERVICE: CPT | Mod: S$PBB,,, | Performed by: INTERNAL MEDICINE

## 2023-01-04 PROCEDURE — 99204 PR OFFICE/OUTPT VISIT, NEW, LEVL IV, 45-59 MIN: ICD-10-PCS | Mod: S$PBB,,, | Performed by: INTERNAL MEDICINE

## 2023-01-04 PROCEDURE — 99215 OFFICE O/P EST HI 40 MIN: CPT | Mod: PBBFAC | Performed by: INTERNAL MEDICINE

## 2023-01-04 PROCEDURE — 99999 PR PBB SHADOW E&M-EST. PATIENT-LVL V: ICD-10-PCS | Mod: PBBFAC,,, | Performed by: INTERNAL MEDICINE

## 2023-01-04 PROCEDURE — 99204 OFFICE O/P NEW MOD 45 MIN: CPT | Mod: S$PBB,,, | Performed by: INTERNAL MEDICINE

## 2023-01-04 PROCEDURE — 99999 PR PBB SHADOW E&M-EST. PATIENT-LVL V: CPT | Mod: PBBFAC,,, | Performed by: INTERNAL MEDICINE

## 2023-01-04 RX ORDER — ATORVASTATIN CALCIUM 40 MG/1
40 TABLET, FILM COATED ORAL DAILY
Qty: 90 TABLET | Refills: 3 | Status: SHIPPED | OUTPATIENT
Start: 2023-01-04 | End: 2024-01-04 | Stop reason: SDUPTHER

## 2023-01-04 NOTE — Clinical Note
RONY Nieto, Would you be ok if I used an SGLT2 (jardiance or farxiga) for him? I think he would benefit from both renal and cardiac standpoint. He tells he no issues with UTI and he is several years out from transplant and we are starting to use these drugs in this population Thanks so much, Josephine

## 2023-01-04 NOTE — ASSESSMENT & PLAN NOTE
No data for review today. A1c above goal and reports both hypo and hyperglycemia  Discussed again use of CGM but he is hesitant. Offered sample of Gin 3 and he is agreeable to this  Gin 3 sample placed in clinic and he will let us know if would like to continue  Will review sugars in 1 week and use this to guide changes  Stop ozempic as he is taking inconsistently. Would benefit from SGLT2 due to CKD, HF if ok with transplant team. No history of UTI

## 2023-01-04 NOTE — PATIENT INSTRUCTIONS
Let me know in one week if you want to continue the Gin    Login is [email or web address]  Password [email or web address]      Bring BP log to cardiology visit on 1/23 so they can advise on how to adjust medications    Take novolog insulin before eating. Check sugar on Gin before all meals and at bedtime but looking at it more often is better

## 2023-01-04 NOTE — ASSESSMENT & PLAN NOTE
BP elevated, did not take medications today and unclear consistency with these   He will see cardiology later this month and asked that he take medications consistently and bring log to visit for further titration

## 2023-01-04 NOTE — TELEPHONE ENCOUNTER
----- Message from Humza Lockett Jr., MD sent at 1/4/2023  8:32 AM CST -----  PIRADS 2 lesion  No bx needed

## 2023-01-04 NOTE — PROGRESS NOTES
Ravi Zamorano is a 67 y.o. male with renal transplant 2016 referred by Kelley Tinajero for evaluation of T2DM    Previously seen by Karan Lopez with last visit in 6/2021. Says did not get recall notice so fell out of routine care    History of Present Illness  T2DM  Diagnosed in 2000  Known complications: ESRD s/p transplant, retinopathy, neuropathy    History of pancreatitis around time of Ember, unknown cause    Reports sugars have generally been a bit elevated but also with some episodes of hypoglycemia  Not taking ozempic as caused too much weight loss    Gin discussed at last visit with Karan but he was hesitant about technology piece    Current Diabetes Regimen:  Lantus 35 units but sometimes will take 20-25 units  Novolog 16 units plus scale  Ozempic- not taking often, did not like weight loss    Timing of prandial insulin: after meals  Omitted doses: denies    Prior mediations tried:  humalog  levemir  trulicity   tradjenta  Metformin     Diet/Exercise:  Does not follow DM diet    Recent Hgb A1C:  Lab Results   Component Value Date    HGBA1C 7.3 (H) 11/11/2022       Glucose Monitoring:  Reports variability in sugars often 175, 150, 200    Hypoglycemic Episodes: sometimes if sugars low-normal and takes full lantus dose    Screening / DM Complications:    Nephropathy:  ACEi/ARB: Taking  Lab Results   Component Value Date    MICALBCREAT 779.0 (H) 11/11/2022    MICALBCREAT 779.0 (H) 11/11/2022       Last Lipid Panel:  Statin: Taking  Lab Results   Component Value Date    LDLCALC 45.6 (L) 06/22/2021       Last foot exam : 12/02/2020  Last eye exam : 04/07/2022;  no laser surgery or DR      Aspirin:taking    HTN  BP above goal today but reports usually better at home    Usually taking chlorthalidone 25 mg instead of 50 mg as prescribed as finds this makes BP low. Did not take any medications today      Current Outpatient Medications:     apixaban (ELIQUIS) 5 mg Tab, Take 1 tablet (5 mg total) by mouth  2 (two) times daily., Disp: 60 tablet, Rfl: 11    aspirin 81 MG Chew, Take 81 mg by mouth once daily., Disp: , Rfl:     atorvastatin (LIPITOR) 40 MG tablet, Take 1 tablet (40 mg total) by mouth once daily., Disp: 90 tablet, Rfl: 3    blood sugar diagnostic (ONETOUCH ULTRA TEST) Strp, USE 1 STRIP TO CHECK BLOOD GLUCOSE FOUR TIMES DAILY, Disp: 400 strip, Rfl: 3    carvediloL (COREG) 25 MG tablet, Take 1 tablet (25 mg total) by mouth 2 (two) times daily., Disp: 180 tablet, Rfl: 3    chlorthalidone (HYGROTEN) 25 MG Tab, Take 2 tablets (50 mg total) by mouth once daily., Disp: 90 tablet, Rfl: 3    famotidine (PEPCID) 20 MG tablet, Take 1 tablet (20 mg total) by mouth every evening., Disp: 90 tablet, Rfl: 3    ferrous sulfate (SLOW FE) 142 mg (45 mg iron) TbSR, Take 1 tablet (142 mg total) by mouth Daily., Disp: 90 tablet, Rfl: 3    flecainide (TAMBOCOR) 100 MG Tab, Take 1 tablet (100 mg total) by mouth every 12 (twelve) hours., Disp: 60 tablet, Rfl: 1    gabapentin (NEURONTIN) 300 MG capsule, Take 1 capsule by mouth in the morning, 1 capsule midday, and 3 capsules at night., Disp: 450 capsule, Rfl: 3    hydrALAZINE (APRESOLINE) 25 MG tablet, Take 1 tablet (25 mg total) by mouth every 12 (twelve) hours., Disp: 60 tablet, Rfl: 11    insulin (LANTUS SOLOSTAR U-100 INSULIN) glargine 100 units/mL (3mL) SubQ pen, Inject 35 units subcutaneously at night (Patient taking differently: Inject 35 units subcutaneously at night), Disp: 12 mL, Rfl: 6    insulin aspart U-100 (NOVOLOG) 100 unit/mL (3 mL) InPn pen, Inject 18 units w/ breakfast, 16 units w/ lunch and dinner plus scale 150-200 +2, 201-250 +4, 251-300 +6, 301-350 +8. Max 80 units daily, Disp: 30 mL, Rfl: 4    lancets (ONETOUCH DELICA PLUS LANCET) 33 gauge Misc, USE TO CHECK BLOOD GLUCOSE FOUR TIMES DAILY, Disp: 400 each, Rfl: 3    meclizine (ANTIVERT) 25 mg tablet, Take 1 tablet (25 mg total) by mouth 3 (three) times daily as needed for Dizziness., Disp: 21 tablet, Rfl: 0     "melatonin 5 mg Tab, Take 1 tablet (5 mg total) by mouth every evening., Disp: , Rfl:     mycophenolate (CELLCEPT) 250 mg Cap, Take 4 capsules (1,000 mg total) by mouth 2 (two) times daily., Disp: 240 capsule, Rfl: 11    pen needle, diabetic (BD ULTRA-FINE LO PEN NEEDLE) 32 gauge x 5/32" Ndle, USE TO ADMINISTER INSULIN 4 TIMES DAILY., Disp: 400 each, Rfl: 3    predniSONE (DELTASONE) 5 MG tablet, Take 1 tablet (5 mg total) by mouth once daily., Disp: 30 tablet, Rfl: 11    spironolactone (ALDACTONE) 25 MG tablet, Take 1 tablet (25 mg total) by mouth once daily., Disp: 90 tablet, Rfl: 1    tacrolimus (PROGRAF) 0.5 MG Cap, Take 1 capsule (0.5 mg total) by mouth every 12 (twelve) hours., Disp: 60 capsule, Rfl: 11    valsartan (DIOVAN) 320 MG tablet, Take 1 tablet (320 mg total) by mouth once daily., Disp: 90 tablet, Rfl: 3    ROS as above    Objective:     Vitals:    01/04/23 0826   BP: (!) 178/80   Pulse: (!) 53     Wt Readings from Last 3 Encounters:   01/04/23 90.6 kg (199 lb 11.8 oz)   12/01/22 91 kg (200 lb 9.9 oz)   11/21/22 89 kg (196 lb 3.4 oz)     Body mass index is 26.35 kg/m².  Physical Exam  Constitutional:       Appearance: He is well-developed.   HENT:      Head: Normocephalic.   Eyes:      Conjunctiva/sclera: Conjunctivae normal.   Pulmonary:      Effort: Pulmonary effort is normal.   Musculoskeletal:         General: Normal range of motion.   Skin:     General: Skin is warm.      Findings: No rash.   Neurological:      Mental Status: He is alert and oriented to person, place, and time.       Labs    Chemistry        Component Value Date/Time     (L) 11/11/2022 0802    K 4.7 11/11/2022 0802     11/11/2022 0802    CO2 24 11/11/2022 0802    BUN 29 (H) 11/11/2022 0802    CREATININE 1.9 (H) 11/21/2022 1040     (H) 11/11/2022 0802        Component Value Date/Time    CALCIUM 9.3 11/11/2022 0802    ALKPHOS 47 (L) 07/12/2022 0453    AST 14 07/12/2022 0453    ALT 6 (L) 07/12/2022 0453    BILITOT " 0.4 07/12/2022 0453    ESTGFRAFRICA 51.9 (A) 07/19/2022 0723    EGFRNONAA 44.9 (A) 07/19/2022 0723              Assessment and Plan     Type 2 diabetes mellitus with both eyes affected by moderate nonproliferative retinopathy without macular edema, without long-term current use of insulin  No data for review today. A1c above goal and reports both hypo and hyperglycemia  Discussed again use of CGM but he is hesitant. Offered sample of Gin 3 and he is agreeable to this  Gin 3 sample placed in clinic and he will let us know if would like to continue  Will review sugars in 1 week and use this to guide changes  Stop ozempic as he is taking inconsistently. Would benefit from SGLT2 due to CKD, HF if ok with transplant team. No history of UTI    Stage 3b chronic kidney disease  As above    Hypertension since 2000  BP elevated, did not take medications today and unclear consistency with these   He will see cardiology later this month and asked that he take medications consistently and bring log to visit for further titration    Hyperparathyroidism due to renal insufficiency  Managed by nephrology and transplant team  No hypercalcemia noted    Hyperlipidemia  LDL at goal  Cont statin    Chronic diastolic congestive heart failure  Consider SGLT2 as above        Will set-up f/u with Karan Lopez who he saw for several years in the past. Labs before visit    Patient has diabetes mellitus and manages diabetes with intensive insulin regimen with three or more insulin injections daily or CSII. Patient requires a therapeutic CGM and is willing to use therapeutic CGM for the necessary frequent adjustments to insulin doses. Patient will benefit from a CGM to prevent hypoglycemia due to high risk for or history of level 2 (<54 mg/dL) hypoglycemic events.  Due to COVID pandemic and need for remote monitoring this tool is medically necessary          Josephine Chun MD

## 2023-01-05 DIAGNOSIS — Z79.4 TYPE 2 DIABETES MELLITUS WITH STAGE 3A CHRONIC KIDNEY DISEASE, WITH LONG-TERM CURRENT USE OF INSULIN: Primary | ICD-10-CM

## 2023-01-05 DIAGNOSIS — N18.31 TYPE 2 DIABETES MELLITUS WITH STAGE 3A CHRONIC KIDNEY DISEASE, WITH LONG-TERM CURRENT USE OF INSULIN: Primary | ICD-10-CM

## 2023-01-05 DIAGNOSIS — E11.22 TYPE 2 DIABETES MELLITUS WITH STAGE 3A CHRONIC KIDNEY DISEASE, WITH LONG-TERM CURRENT USE OF INSULIN: Primary | ICD-10-CM

## 2023-01-18 RX ORDER — LANCETS
EACH MISCELLANEOUS
Qty: 100 EACH | Refills: 3 | Status: CANCELLED | OUTPATIENT
Start: 2023-01-18

## 2023-01-18 RX ORDER — INSULIN PUMP SYRINGE, 3 ML
EACH MISCELLANEOUS
Qty: 1 EACH | Refills: 0 | Status: CANCELLED | OUTPATIENT
Start: 2023-01-18

## 2023-01-18 NOTE — TELEPHONE ENCOUNTER
Pt stated that he checked it 3 times a day but stated that its not the strips. Its a patch like sensor and he checks it on his phone.

## 2023-01-18 NOTE — TELEPHONE ENCOUNTER
No new care gaps identified.  [facility] Embedded Care Gaps. Reference number: [id number]. 1/18/2023   2:14:01 PM CST

## 2023-01-19 DIAGNOSIS — I73.9 PAD (PERIPHERAL ARTERY DISEASE): Primary | ICD-10-CM

## 2023-01-19 NOTE — TELEPHONE ENCOUNTER
I called and spoke to pt and he did see Jj Lopez he said that his ins is giving him some problems  He needs the patches for his devise that goes on the back of his arm.   I told him Iwould forward this message to Lopez staff box to see if there is anything that he can be helped with as far as maybe getting some samples? Until ins is taken care of?

## 2023-01-19 NOTE — TELEPHONE ENCOUNTER
Please get information regarding exactly which one it is  You can call the pharmacy  It is not on his med list

## 2023-01-20 ENCOUNTER — TELEPHONE (OUTPATIENT)
Dept: ELECTROPHYSIOLOGY | Facility: CLINIC | Age: 68
End: 2023-01-20
Payer: MEDICARE

## 2023-01-20 ENCOUNTER — TELEPHONE (OUTPATIENT)
Dept: INTERNAL MEDICINE | Facility: CLINIC | Age: 68
End: 2023-01-20
Payer: MEDICARE

## 2023-01-20 NOTE — TELEPHONE ENCOUNTER
----- Message from Ravi Washburn sent at 1/20/2023 10:30 AM CST -----  Contact: [phone]  Pt would like a call back about if it is safe for him to go on a cruise. Please call pt back.      72

## 2023-01-20 NOTE — TELEPHONE ENCOUNTER
----- Message from REYES Rai, FNP sent at 1/20/2023  8:05 AM CST -----  Regarding: offer audio 2/22/23 2/22 at 130p -offer audio  Thanks  Karan Bennett all 2nd thursdays starting 2/9 - I have block for pm-- diabetes support meeting

## 2023-01-20 NOTE — TELEPHONE ENCOUNTER
Spoke to pt. Pt is ok with 2/22/23 at 1:30. Would like an in person appt contingent on having insurance coverage

## 2023-01-20 NOTE — TELEPHONE ENCOUNTER
Called pt to confirm appointment on Monday with Dr. Bowden. Pt stated he was changing insurance and wanted to wait until he got that figured out to see Dr. Bowden. Pt cancelled appointment Monday and will call back when ready to reschedule. Pt thanked me for calling.

## 2023-01-27 ENCOUNTER — TELEPHONE (OUTPATIENT)
Dept: ENDOCRINOLOGY | Facility: CLINIC | Age: 68
End: 2023-01-27
Payer: MEDICARE

## 2023-01-27 DIAGNOSIS — N18.31 TYPE 2 DIABETES MELLITUS WITH STAGE 3A CHRONIC KIDNEY DISEASE, WITH LONG-TERM CURRENT USE OF INSULIN: Primary | ICD-10-CM

## 2023-01-27 DIAGNOSIS — Z79.4 TYPE 2 DIABETES MELLITUS WITH STAGE 3A CHRONIC KIDNEY DISEASE, WITH LONG-TERM CURRENT USE OF INSULIN: Primary | ICD-10-CM

## 2023-01-27 DIAGNOSIS — E11.22 TYPE 2 DIABETES MELLITUS WITH STAGE 3A CHRONIC KIDNEY DISEASE, WITH LONG-TERM CURRENT USE OF INSULIN: Primary | ICD-10-CM

## 2023-01-27 NOTE — TELEPHONE ENCOUNTER
Spoke with patient, gave him new insulin doses and had him repeat back so he understands. Pt states he loves the maycol system. He would like to continue using a CGM. Pt states his new insurance will be starting on February 1 and it will be [employer] medicare. Told pt we would take care of sending the necessary forms to the supply company ([employer]) and that he should expect a call from them to verify ins and shipping address. Explained that [employer] does not cover the maycol 3 yet, only the 2, but that they do cover the dexcom system and pt states he prefers a system that does not require scanning. Informed pt he will need to come in for diabetes education to learn how to use the dexcom and get everything set up to be connected with our clinic. Pt is agreeable to this and I will call him back once order for education is placed.

## 2023-01-27 NOTE — TELEPHONE ENCOUNTER
----- Message from Josephine Chun MD sent at 1/27/2023  9:59 AM CST -----  Please call with this information:    I am reviewing the Gin dated now and do think we can uses to help change your medication regimen.  I think we need to decrease the Lantus dose from 35 to32 units and increase your NovoLog from 16 to18 units with meals assuming you are taking this consistently.     How do like the Gin?  Would you like to continue using this tool?

## 2023-01-27 NOTE — TELEPHONE ENCOUNTER
Spoke with patient again to get him scheduled for diabetes education to learn how to use the dexcom. Also got pts member id number for his [employer] insurance so I can submit the paperwork ([id number])

## 2023-02-01 DIAGNOSIS — I50.32 CHRONIC DIASTOLIC CONGESTIVE HEART FAILURE: ICD-10-CM

## 2023-02-01 DIAGNOSIS — I15.0 RENOVASCULAR HYPERTENSION: ICD-10-CM

## 2023-02-01 RX ORDER — SPIRONOLACTONE 25 MG/1
25 TABLET ORAL DAILY
Qty: 90 TABLET | Refills: 1 | Status: SHIPPED | OUTPATIENT
Start: 2023-02-01 | End: 2023-08-04 | Stop reason: SDUPTHER

## 2023-02-01 NOTE — TELEPHONE ENCOUNTER
No new care gaps identified.  [facility] Embedded Care Gaps. Reference number: [id number]. 2/01/2023   8:21:15 AM CST

## 2023-02-02 ENCOUNTER — TELEPHONE (OUTPATIENT)
Dept: INTERNAL MEDICINE | Facility: CLINIC | Age: 68
End: 2023-02-02
Payer: MEDICARE

## 2023-02-02 NOTE — TELEPHONE ENCOUNTER
----- Message from Ravi Washburn sent at 2/2/2023  3:12 PM CST -----  Contact: [phone]  Pt said he needs a call back about a form he drooped off on yesterday and never heard anything back about it. Please call pt back.

## 2023-02-09 ENCOUNTER — INITIAL CONSULT (OUTPATIENT)
Dept: VASCULAR SURGERY | Facility: CLINIC | Age: 68
End: 2023-02-09
Attending: SURGERY
Payer: MEDICARE

## 2023-02-09 ENCOUNTER — HOSPITAL ENCOUNTER (OUTPATIENT)
Dept: VASCULAR SURGERY | Facility: CLINIC | Age: 68
Discharge: HOME | End: 2023-02-09
Attending: SURGERY
Payer: MEDICARE

## 2023-02-09 VITALS
HEART RATE: 46 BPM | SYSTOLIC BLOOD PRESSURE: 148 MMHG | HEIGHT: 73 IN | TEMPERATURE: 98 F | DIASTOLIC BLOOD PRESSURE: 67 MMHG | WEIGHT: 198.44 LBS | BODY MASS INDEX: 26.3 KG/M2

## 2023-02-09 DIAGNOSIS — I73.9 PAD (PERIPHERAL ARTERY DISEASE): Primary | ICD-10-CM

## 2023-02-09 DIAGNOSIS — I73.9 PAD (PERIPHERAL ARTERY DISEASE): ICD-10-CM

## 2023-02-09 PROCEDURE — 99999 PR PBB SHADOW E&M-EST. PATIENT-LVL IV: CPT | Mod: PBBFAC,,, | Performed by: SURGERY

## 2023-02-09 PROCEDURE — 99999 PR PBB SHADOW E&M-EST. PATIENT-LVL IV: ICD-10-PCS | Mod: PBBFAC,,, | Performed by: SURGERY

## 2023-02-09 PROCEDURE — 99204 OFFICE O/P NEW MOD 45 MIN: CPT | Mod: S$GLB,,, | Performed by: SURGERY

## 2023-02-09 PROCEDURE — 99204 PR OFFICE/OUTPT VISIT, NEW, LEVL IV, 45-59 MIN: ICD-10-PCS | Mod: S$GLB,,, | Performed by: SURGERY

## 2023-02-09 PROCEDURE — 93923 PR NON-INVASIVE PHYSIOLOGIC STUDY EXTREMITY 3 LEVELS: ICD-10-PCS | Mod: S$GLB,,, | Performed by: SURGERY

## 2023-02-09 PROCEDURE — 93923 UPR/LXTR ART STDY 3+ LVLS: CPT | Mod: S$GLB,,, | Performed by: SURGERY

## 2023-02-09 NOTE — PROGRESS NOTES
Ravi Zamorano  02/09/2023    HPI:  Patient is a 67 y.o. male PMH ESKD 2/2 DM & HTN and who was on HD and then obtained a DBD kidney transplant (2016), post-kidney transplant CKD 3b, in addition he suffers from Hepatitis C (treated 2014), afib s/p cardioversion (2019) on Eliquis, chronic diastolic heart failure, diabetic neuropathy & retinopathy, BPH, erectile dysfunction, and anxiety/depression who is here today for evaluation of RLE neuropathy in setting of abnormal WENDI. RUE and RLE neuropathic pain that's been going on for several weeks and is being treated with GBP. Also describes burning pain in toes at night that is excruciating when blankets are over    Tobacco use: 20 pack year; quit 2016    Past Medical History:   Diagnosis Date    Anxiety 7/29/2014    Arthritis     Bilateral diabetic retinopathy 2017    CKD (chronic kidney disease) stage 2, GFR 60-89 ml/min 12/28/2016    CKD (chronic kidney disease) stage 4, GFR 15-29 ml/min 7/29/2014    Colon polyps 2014    Depression - situational 7/29/2014    Diabetes mellitus     Diabetes type 2 since 2000 7/29/2014    Diabetic neuropathy 7/29/2014    History of cardioversion 10/3/2019    History of hepatitis C, s/p successful Rx w/ SVR24 - 9/2017 7/29/2014    Genotype 1a, treatment naive 10/2014 liver biopsy - grade 1 / stage 1 Completed Harvoni w/ SVR    Hyperlipidemia 7/29/2014    Hypertension     Neuropathy     Organ transplant candidate 7/29/2014    Pancreatitis     S/P cadaveric kidney transplant 11/5/2016. ESRD secondary to HTN/DMII 11/5/2016    Stage 3a chronic kidney disease 12/28/2016    Type 2 diabetes mellitus with stage 3a chronic kidney disease, with long-term current use of insulin 7/29/2014     Past Surgical History:   Procedure Laterality Date    APPENDECTOMY      COLONOSCOPY N/A 12/21/2020    Procedure: COLONOSCOPY;  Surgeon: Tico Bell MD;  Location: [facility] ([address] [place] [address]);  Service: Endoscopy;  Laterality: N/A;  ok to hold eliquis per Dr. Valadez,  see telephone encounter 2020-MS  covid test  [employer]    KIDNEY TRANSPLANT      TRANSESOPHAGEAL ECHOCARDIOGRAPHY N/A 2019    Procedure: ECHOCARDIOGRAM, TRANSESOPHAGEAL;  Surgeon: Dolores Diagnostic Provider;  Location: [facility];  Service: Cardiology;  Laterality: N/A;    TREATMENT OF CARDIAC ARRHYTHMIA N/A 2019    Procedure: CARDIOVERSION;  Surgeon: Bronson Bowden MD;  Location: [facility];  Service: Cardiology;  Laterality: N/A;  AF, FELICIANO, DCCV, MAC, GP, 3 PREP     Family History   Problem Relation Age of Onset    Diabetes Mother     Hypertension Mother     Heart disease Mother         CAD with PCI    Heart disease Father     Cancer Brother         one sister with breast cancer    Hypertension Brother         one sister with HTN and borderline DM    Stroke Neg Hx     Kidney disease Neg Hx      Social History     Socioeconomic History    Marital status:    Occupational History     Employer: [employer]   Tobacco Use    Smoking status: Former     Packs/day: 0.50     Years: 32.00     Pack years: 16.00     Types: Cigarettes     Quit date: 2016     Years since quittin.2    Smokeless tobacco: Never    Tobacco comments:     Pt reports that he quit in , but started up again in . pt reports he is currently working on quitting again   Substance and Sexual Activity    Alcohol use: Yes     Comment: Pt reports occasional beers on Sundays. Pt reports drinking daily prior to ESRD diagnosis.    Drug use: No    Sexual activity: Yes     Partners: Female   Social History Narrative     for 14 years     for 29 years    2 children ages 35, 36     Current Outpatient Medications on File Prior to Visit   Medication Sig    apixaban (ELIQUIS) 5 mg Tab Take 1 tablet (5 mg total) by mouth 2 (two) times daily.    aspirin 81 MG Chew Take 81 mg by mouth once daily.    atorvastatin (LIPITOR) 40 MG tablet Take 1 tablet (40 mg total) by mouth once daily.    carvediloL  "(COREG) 25 MG tablet Take 1 tablet (25 mg total) by mouth 2 (two) times daily.    chlorthalidone (HYGROTEN) 25 MG Tab Take 2 tablets (50 mg total) by mouth once daily.    empagliflozin (JARDIANCE) 10 mg tablet Take 1 tablet (10 mg total) by mouth once daily.    famotidine (PEPCID) 20 MG tablet Take 1 tablet (20 mg total) by mouth every evening.    ferrous sulfate (SLOW FE) 142 mg (45 mg iron) TbSR Take 1 tablet (142 mg total) by mouth Daily.    flecainide (TAMBOCOR) 100 MG Tab Take 1 tablet (100 mg total) by mouth every 12 (twelve) hours.    gabapentin (NEURONTIN) 300 MG capsule Take 1 capsule by mouth in the morning, 1 capsule midday, and 3 capsules at night.    hydrALAZINE (APRESOLINE) 25 MG tablet Take 1 tablet (25 mg total) by mouth every 12 (twelve) hours.    insulin (LANTUS SOLOSTAR U-100 INSULIN) glargine 100 units/mL (3mL) SubQ pen Inject 35 units subcutaneously at night (Patient taking differently: Inject 35 units subcutaneously at night)    insulin aspart U-100 (NOVOLOG) 100 unit/mL (3 mL) InPn pen Inject 18 units w/ breakfast, 16 units w/ lunch and dinner plus scale 150-200 +2, 201-250 +4, 251-300 +6, 301-350 +8. Max 80 units daily    meclizine (ANTIVERT) 25 mg tablet Take 1 tablet (25 mg total) by mouth 3 (three) times daily as needed for Dizziness.    melatonin 5 mg Tab Take 1 tablet (5 mg total) by mouth every evening.    mycophenolate (CELLCEPT) 250 mg Cap Take 4 capsules (1,000 mg total) by mouth 2 (two) times daily.    pen needle, diabetic (BD ULTRA-FINE LO PEN NEEDLE) 32 gauge x 5/32" Ndle USE TO ADMINISTER INSULIN 4 TIMES DAILY.    predniSONE (DELTASONE) 5 MG tablet Take 1 tablet (5 mg total) by mouth once daily.    spironolactone (ALDACTONE) 25 MG tablet Take 1 tablet (25 mg total) by mouth once daily.    tacrolimus (PROGRAF) 0.5 MG Cap Take 1 capsule (0.5 mg total) by mouth every 12 (twelve) hours.    valsartan (DIOVAN) 320 MG tablet Take 1 tablet (320 mg total) by mouth once daily.    " [DISCONTINUED] hydroCHLOROthiazide (HYDRODIURIL) 12.5 MG Tab Take 1 tablet (12.5 mg total) by mouth once daily.    [DISCONTINUED] insulin lispro (HUMALOG KWIKPEN INSULIN) 100 unit/mL pen Inject 18 units w/ breakfast, 16 units w/ lunch and dinner plus scale 150-200 +2, 201-250 +4, 251-300 +6, 301-350 +8.    [DISCONTINUED] lancets (ONETOUCH DELICA PLUS LANCET) 33 gauge Misc USE TO CHECK BLOOD GLUCOSE FOUR TIMES DAILY     No current facility-administered medications on file prior to visit.       REVIEW OF SYSTEMS:  General: negative; ENT: negative; Allergy and Immunology: negative; Hematological and Lymphatic: Negative; Endocrine: negative; Respiratory: no cough, shortness of breath, or wheezing; Cardiovascular: no chest pain or dyspnea on exertion; Gastrointestinal: no abdominal pain/back, change in bowel habits, or bloody stools; Genito-Urinary: no dysuria, trouble voiding, or hematuria; Musculoskeletal: negative  Neurological: no TIA or stroke symptoms    PHYSICAL EXAM:   Right Arm BP - Sittin/67 (23 1114)  Left Arm BP - Sittin/69 (23 1114)  Pulse: (!) 46  Temp: 98.1 °F (36.7 °C)      General appearance:  Alert, well-appearing, and in no distress.  Oriented to person, place, and time   Neurological: Normal speech, no focal findings noted; CN II - XII grossly intact           Musculoskeletal: Digits/nail without cyanosis/clubbing.  Normal muscle strength/tone.                 Neck: Supple, no significant adenopathy; thyroid is not enlarged                        Chest:  Clear to auscultation, no wheezes, rales or rhonchi, symmetric air entry     No use of accessory muscles             Cardiac: Normal rate and regular rhythm, S1 and S2 normal; PMI non-displaced          Abdomen: Soft, nontender, nondistended, no masses or organomegaly     No rebound tenderness noted; bowel sounds normal     No groin adenopathy      Extremities:    Non palpable DP/PT; R PT doppler signal monophasic, o/w no  doppler     No pedal edema     No ulcerations    LAB RESULTS:  Lab Results   Component Value Date    K 4.7 11/11/2022    K 4.2 10/13/2022    K 4.2 10/13/2022    CREATININE 1.9 (H) 11/21/2022    CREATININE 1.7 (H) 11/11/2022    CREATININE 1.8 (H) 10/13/2022    CREATININE 1.8 (H) 10/13/2022     Lab Results   Component Value Date    WBC 5.23 11/11/2022    WBC 4.73 10/13/2022    WBC 4.38 08/24/2022    HCT 32.8 (L) 11/11/2022    HCT 36.3 (L) 10/13/2022    HCT 38.5 (L) 08/24/2022     (L) 11/11/2022     10/13/2022     (L) 08/24/2022     Lab Results   Component Value Date    HGBA1C 7.3 (H) 11/11/2022    HGBA1C 7.8 (H) 07/11/2022    HGBA1C 7.0 (H) 06/22/2021     IMAGING:  WENDI/TBI 2/9/23: TBI 0.78/0.72 (L/R), WENDI 0.69/0.73 (L/R)  IMP/PLAN:  67 y.o. male with a history of ESKD 2/2 DM & HTN and who was on HD and then obtained a DBD kidney transplant (2016), post-kidney transplant CKD 3b, in addition he suffers from Hepatitis C (treated 2014), afib s/p cardioversion (2019) on Eliquis, chronic diastolic heart failure, diabetic neuropathy & retinopathy. Here today for evaluation of RLE neuropathy in setting of abnormal WENDI.    Plan:  --WENDI in year  --Likely neuropathy 2/2 microvascular ischemic disease from DM    Thuan Harris MD  Vascular & Endovascular Surgery

## 2023-02-22 ENCOUNTER — TELEPHONE (OUTPATIENT)
Dept: INTERNAL MEDICINE | Facility: CLINIC | Age: 68
End: 2023-02-22

## 2023-02-22 ENCOUNTER — TELEPHONE (OUTPATIENT)
Dept: INTERNAL MEDICINE | Facility: CLINIC | Age: 68
End: 2023-02-22
Payer: MEDICARE

## 2023-02-22 ENCOUNTER — OFFICE VISIT (OUTPATIENT)
Dept: INTERNAL MEDICINE | Facility: CLINIC | Age: 68
End: 2023-02-22
Payer: MEDICARE

## 2023-02-22 DIAGNOSIS — Z94.0 S/P KIDNEY TRANSPLANT: ICD-10-CM

## 2023-02-22 DIAGNOSIS — T38.0X5D ADVERSE EFFECT OF GLUCOCORTICOID OR SYNTHETIC ANALOGUE, SUBSEQUENT ENCOUNTER: ICD-10-CM

## 2023-02-22 DIAGNOSIS — Z79.4 TYPE 2 DIABETES MELLITUS WITH STAGE 3A CHRONIC KIDNEY DISEASE, WITH LONG-TERM CURRENT USE OF INSULIN: ICD-10-CM

## 2023-02-22 DIAGNOSIS — N18.31 TYPE 2 DIABETES MELLITUS WITH STAGE 3A CHRONIC KIDNEY DISEASE, WITH LONG-TERM CURRENT USE OF INSULIN: ICD-10-CM

## 2023-02-22 DIAGNOSIS — I50.32 CHRONIC DIASTOLIC CONGESTIVE HEART FAILURE: ICD-10-CM

## 2023-02-22 DIAGNOSIS — I10 PRIMARY HYPERTENSION: ICD-10-CM

## 2023-02-22 DIAGNOSIS — E78.2 MIXED HYPERLIPIDEMIA: ICD-10-CM

## 2023-02-22 DIAGNOSIS — I73.9 PAD (PERIPHERAL ARTERY DISEASE): ICD-10-CM

## 2023-02-22 DIAGNOSIS — E11.3393 TYPE 2 DIABETES MELLITUS WITH BOTH EYES AFFECTED BY MODERATE NONPROLIFERATIVE RETINOPATHY WITHOUT MACULAR EDEMA, WITHOUT LONG-TERM CURRENT USE OF INSULIN: Primary | ICD-10-CM

## 2023-02-22 DIAGNOSIS — N18.32 STAGE 3B CHRONIC KIDNEY DISEASE: ICD-10-CM

## 2023-02-22 DIAGNOSIS — E11.22 TYPE 2 DIABETES MELLITUS WITH STAGE 3A CHRONIC KIDNEY DISEASE, WITH LONG-TERM CURRENT USE OF INSULIN: ICD-10-CM

## 2023-02-22 DIAGNOSIS — E11.3393 TYPE 2 DIABETES MELLITUS WITH BOTH EYES AFFECTED BY MODERATE NONPROLIFERATIVE RETINOPATHY WITHOUT MACULAR EDEMA, WITHOUT LONG-TERM CURRENT USE OF INSULIN: ICD-10-CM

## 2023-02-22 DIAGNOSIS — E11.42 DIABETIC POLYNEUROPATHY ASSOCIATED WITH TYPE 2 DIABETES MELLITUS: ICD-10-CM

## 2023-02-22 DIAGNOSIS — Z79.60 LONG-TERM USE OF IMMUNOSUPPRESSANT MEDICATION: ICD-10-CM

## 2023-02-22 PROCEDURE — 1159F PR MEDICATION LIST DOCUMENTED IN MEDICAL RECORD: ICD-10-PCS | Mod: CPTII,95,, | Performed by: NURSE PRACTITIONER

## 2023-02-22 PROCEDURE — 99442 PR PHYSICIAN TELEPHONE EVALUATION 11-20 MIN: CPT | Mod: 95,,, | Performed by: NURSE PRACTITIONER

## 2023-02-22 PROCEDURE — 3072F PR LOW RISK FOR RETINOPATHY: ICD-10-PCS | Mod: CPTII,95,, | Performed by: NURSE PRACTITIONER

## 2023-02-22 PROCEDURE — 4010F ACE/ARB THERAPY RXD/TAKEN: CPT | Mod: CPTII,95,, | Performed by: NURSE PRACTITIONER

## 2023-02-22 PROCEDURE — 3072F LOW RISK FOR RETINOPATHY: CPT | Mod: CPTII,95,, | Performed by: NURSE PRACTITIONER

## 2023-02-22 PROCEDURE — 4010F PR ACE/ARB THEARPY RXD/TAKEN: ICD-10-PCS | Mod: CPTII,95,, | Performed by: NURSE PRACTITIONER

## 2023-02-22 PROCEDURE — 1160F RVW MEDS BY RX/DR IN RCRD: CPT | Mod: CPTII,95,, | Performed by: NURSE PRACTITIONER

## 2023-02-22 PROCEDURE — 99442 PR PHYSICIAN TELEPHONE EVALUATION 11-20 MIN: ICD-10-PCS | Mod: 95,,, | Performed by: NURSE PRACTITIONER

## 2023-02-22 PROCEDURE — 1159F MED LIST DOCD IN RCRD: CPT | Mod: CPTII,95,, | Performed by: NURSE PRACTITIONER

## 2023-02-22 PROCEDURE — 1160F PR REVIEW ALL MEDS BY PRESCRIBER/CLIN PHARMACIST DOCUMENTED: ICD-10-PCS | Mod: CPTII,95,, | Performed by: NURSE PRACTITIONER

## 2023-02-22 RX ORDER — INSULIN GLARGINE 100 [IU]/ML
INJECTION, SOLUTION SUBCUTANEOUS
Qty: 12 ML | Refills: 6
Start: 2023-02-22 | End: 2023-11-08 | Stop reason: SDUPTHER

## 2023-02-22 RX ORDER — INSULIN ASPART 100 [IU]/ML
INJECTION, SOLUTION INTRAVENOUS; SUBCUTANEOUS
Qty: 30 ML | Refills: 6 | Status: SHIPPED | OUTPATIENT
Start: 2023-02-22 | End: 2023-11-08 | Stop reason: SDUPTHER

## 2023-02-22 RX ORDER — GLUCAGON 3 MG/1
POWDER NASAL
Qty: 2 EACH | Refills: 2 | Status: SHIPPED | OUTPATIENT
Start: 2023-02-22 | End: 2023-02-22

## 2023-02-22 RX ORDER — INSULIN ASPART 100 [IU]/ML
INJECTION, SOLUTION INTRAVENOUS; SUBCUTANEOUS
Qty: 30 ML | Refills: 4
Start: 2023-02-22 | End: 2023-02-22 | Stop reason: SDUPTHER

## 2023-02-22 NOTE — PROGRESS NOTES
Established Patient - Audio Only Telehealth Visit     The patient location is: home   The chief complaint leading to consultation is: type 2 dm   Visit type: Virtual visit with audio only (telephone)  Total time spent with patient: 15, 22 mins    Current meds:  Lantus 33 units at night  Novolog 18-16-16 units ac   Jardiance 10 mg daily     Low 60s  Highest 188, 200s mg/dl     Supplier:  [facility]   Gin 2  Seen in the past by Dr. Adiel Jamil -recently increased to 50 mg daily   Dealing w/ hypotension this am   Goal to drink 4 bottles of water/day     On MDI injections 4 x a day   Testing 4 x a day  Patient is willing and able to use the device  Demonstrated an understanding of the technology and is motivated to use CGM  Patient expected to adhere to a comprehensive diabetes treatment plan and patient has adequate medical supervision  Patient experiences multiple impaired awareness of hypoglycemia (hypoglycemia unawareness)        The reason for the audio only service rather than synchronous audio and video virtual visit was related to technical difficulties or patient preference/necessity.     Each patient to whom I provide medical services by telemedicine is:  (1) informed of the relationship between the physician and patient and the respective role of any other health care provider with respect to management of the patient; and (2) notified that they may decline to receive medical services by telemedicine and may withdraw from such care at any time. Patient verbally consented to receive this service via voice-only telephone call.       HPI: type 2 dm      Assessment and plan:    1. Type 2 diabetes mellitus with both eyes affected by moderate nonproliferative retinopathy without macular edema, without long-term current use of insulin  insulin (LANTUS SOLOSTAR U-100 INSULIN) glargine 100 units/mL SubQ pen    DISCONTINUED: insulin aspart U-100 (NOVOLOG) 100 unit/mL (3 mL) InPn pen      2. S/P cadaveric  kidney transplant 11/5/2016. ESRD secondary to HTN/DMII        3. Stage 3b chronic kidney disease        4. Type 2 diabetes mellitus with stage 3a chronic kidney disease, with long-term current use of insulin        5. Long-term use of immunosuppressant medication        6. PAD (peripheral artery disease)        7. Primary hypertension        8. Mixed hyperlipidemia  Lipid Panel      9. Diabetic polyneuropathy associated with type 2 diabetes mellitus  Ambulatory referral/consult to Diabetes Education    Hemoglobin A1C      10. Chronic diastolic congestive heart failure        11. Adverse effect of glucocorticoid or synthetic analogue, subsequent encounter          1-11. Follow up in 4 months w/ me   Change novolog to 39-68-34-cut in 1/2 if sugar is less than 100   Continue lantus 33 units at night.   A1c prior (1-7 days before appt.)  A1c goal less than 7.5%   F/u with cards   Bp controlled   F/u with vascular medicine   F/u with transplant   F/u with retinal specialist   Lab Results   Component Value Date    LDLCALC 45.6 (L) 06/22/2021     At goal   Lipid next time   Send rx baqsimi        This service was not originating from a related E/M service provided within the previous 7 days nor will  to an E/M service or procedure within the next 24 hours or my soonest available appointment.  Prevailing standard of care was able to be met in this audio-only visit.

## 2023-02-22 NOTE — TELEPHONE ENCOUNTER
----- Message from REYES Rai, SALOMEP sent at 2/22/2023  3:16 PM CST -----  A1c lipid prior   Follow up in 4 months   Gin 2 restart

## 2023-02-22 NOTE — TELEPHONE ENCOUNTER
Spoke to pt. Pt stated not feeling well due to low b/p. Let pt know we can switch appt to audio. Pt accepted.

## 2023-02-22 NOTE — TELEPHONE ENCOUNTER
Spoke to pt. Pt states the pharmacy informed him that the Novolog insulin was discontinued. Pt is wondering why. He also stated the glucagon (BAQSIMI) was over $40 dollars and does not want to pay that much. He states he has sugar tablets from [employer] and is wondering if those are ok to take instead.

## 2023-02-22 NOTE — TELEPHONE ENCOUNTER
----- Message from Tita Delatorre sent at 2/22/2023 11:13 AM CST -----  Contact: Self/[phone]  Pt said that he is calling in regards to needing to r/s his appt that he has scheduled for today. Please advise

## 2023-02-22 NOTE — TELEPHONE ENCOUNTER
----- Message from Deepa Padilla sent at 2/22/2023  4:36 PM CST -----  Contact: Self [phone]  Would like to receive medical advice.    Pharmacy name/number (copy/paste from chart):      [facility]  [address]  [facility] [address]  Phone: [phone] Fax: [phone]    Would they like a call back or a response via [facility]:  call back    Additional information:  Calling to speak with the nurse regarding  insulin aspart U-100 (NOVOLOG) 100 unit/mL (3 mL) InPn pen. Pt states pharmacy stated medication was discounted and pt would like to confirm reason.  Pt is also requesting a cheaper medication in place of glucagon (BAQSIMI) 3 mg/actuation Spry because of cost.

## 2023-02-23 RX ORDER — FLECAINIDE ACETATE 100 MG/1
100 TABLET ORAL EVERY 12 HOURS
Qty: 60 TABLET | Refills: 1 | Status: SHIPPED | OUTPATIENT
Start: 2023-02-23 | End: 2023-04-04

## 2023-03-01 ENCOUNTER — PATIENT OUTREACH (OUTPATIENT)
Dept: ADMINISTRATIVE | Facility: HOSPITAL | Age: 68
End: 2023-03-01
Payer: MEDICARE

## 2023-03-01 ENCOUNTER — LAB VISIT (OUTPATIENT)
Dept: LAB | Facility: HOSPITAL | Age: 68
End: 2023-03-01
Attending: INTERNAL MEDICINE
Payer: MEDICARE

## 2023-03-01 ENCOUNTER — CLINICAL SUPPORT (OUTPATIENT)
Dept: DIABETES | Facility: CLINIC | Age: 68
End: 2023-03-01
Payer: MEDICARE

## 2023-03-01 DIAGNOSIS — Z79.4 TYPE 2 DIABETES MELLITUS WITH STAGE 3A CHRONIC KIDNEY DISEASE, WITH LONG-TERM CURRENT USE OF INSULIN: ICD-10-CM

## 2023-03-01 DIAGNOSIS — E11.22 TYPE 2 DIABETES MELLITUS WITH STAGE 3A CHRONIC KIDNEY DISEASE, WITH LONG-TERM CURRENT USE OF INSULIN: ICD-10-CM

## 2023-03-01 DIAGNOSIS — N18.31 TYPE 2 DIABETES MELLITUS WITH STAGE 3A CHRONIC KIDNEY DISEASE, WITH LONG-TERM CURRENT USE OF INSULIN: ICD-10-CM

## 2023-03-01 LAB
ALBUMIN/CREAT UR: 465 UG/MG (ref 0–30)
CREAT UR-MCNC: 117 MG/DL (ref 23–375)
MICROALBUMIN UR DL<=1MG/L-MCNC: 544 UG/ML

## 2023-03-01 PROCEDURE — 99211 OFF/OP EST MAY X REQ PHY/QHP: CPT | Mod: PBBFAC | Performed by: DIETITIAN, REGISTERED

## 2023-03-01 PROCEDURE — 99999 PR PBB SHADOW E&M-EST. PATIENT-LVL I: CPT | Mod: PBBFAC,,,

## 2023-03-01 PROCEDURE — G0108 DIAB MANAGE TRN  PER INDIV: HCPCS | Mod: S$GLB,,, | Performed by: DIETITIAN, REGISTERED

## 2023-03-01 PROCEDURE — G0108 PR DIAB MANAGE TRN  PER INDIV: ICD-10-PCS | Mod: S$GLB,,, | Performed by: DIETITIAN, REGISTERED

## 2023-03-01 PROCEDURE — 82570 ASSAY OF URINE CREATININE: CPT | Performed by: INTERNAL MEDICINE

## 2023-03-01 PROCEDURE — 99999 PR PBB SHADOW E&M-EST. PATIENT-LVL I: ICD-10-PCS | Mod: PBBFAC,,,

## 2023-03-01 PROCEDURE — G0108 DIAB MANAGE TRN  PER INDIV: HCPCS | Mod: PBBFAC | Performed by: DIETITIAN, REGISTERED

## 2023-03-01 RX ORDER — BLOOD-GLUCOSE SENSOR
1 EACH MISCELLANEOUS
Qty: 2 EACH | Refills: 11 | Status: SHIPPED | OUTPATIENT
Start: 2023-03-01 | End: 2023-03-13

## 2023-03-01 NOTE — PROGRESS NOTES
Health Maintenance Due   Topic Date Due    Pneumococcal Vaccines (Age 65+) (3 - PPSV23 if available, else PCV20) 11/14/2019    Foot Exam  12/02/2021    Abdominal Aortic Aneurysm Screening  11/08/2022      updated. Triggered LINKS and Care Everywhere. Chart reviewed for HTN outreach. Bp high, has follow up appt set.    Maria De Jesus yN LPN   Clinical Care Coordinator  Primary Care and Wellness

## 2023-03-01 NOTE — PROGRESS NOTES
Diabetes Care Specialist Progress Note  Author: Janet Carrera RD, CDE  Date: 3/1/2023    Program Intake  Reason for Diabetes Program Visit:: Intervention  Type of Intervention:: Individual  Individual: Device Training  Device Training: Personal CGM (Freestyle Gin 3)    Current diabetes risk level:: low      Lab Results   Component Value Date    HGBA1C 7.2 (H) 2023         Clinical  Problem Review  Reviewed Problem List with Patient: yes  Active comorbidities affecting diabetes self-care.: no  Reviewed health maintenance: yes    Clinical Assessment  Current Diabetes Treatment: Oral Medication, Insulin  Jardiance  Lantus 33 units at night   Novolog 18 units AC    Have you ever experienced hypoglycemia (low blood sugar)?: yes  In the last month, how often have you experienced low blood sugar?: once every other week  Are you able to tell when your blood sugar is low?: Yes  How do you treat hypoglycemia (low blood sugar)?: 4 glucose tablets, 5-6 pieces of hard candy, 1/2 can soda/fruit juice    SMBG via Gin 3  (wore sample in early )  Average B mg/dL  Time in Range:  49%  (1% low, 50% high)  GMI:  7.7%    SMBG 3 times daily  Am:  120-126  Noon:  200+  Pm:  200+      Medication Information  Do you sometimes have difficulty refilling your medications?: No  Medication adherence impacting ability to self-manage diabetes?: No    Labs  Lab Compliance Barriers: No        Nutritional Status  Diet: Regular    Drinks: 4 oz soft drink, water  Meal Plan 24 Hour Recall - Breakfast: 2 biscuits with butter and SF syrup  Meal Plan 24 Hour Recall - Lunch: sometimes skips  Meal Plan 24 Hour Recall - Dinner: 1/2 chicken sandwich, onion rings  Meal Plan 24 Hour Recall - Snack: minimal, sometimes oreo thins    Current nutritional status an area of need that is impacting patient's ability to self-manage diabetes?: No          Additional Social History  Support  Does anyone support you with your diabetes care?:  (pt is  primary caregiver)  Who takes you to your medical appointments?: self  Does the current support meet the patient's needs?: Yes  Is Support an area impacting ability to self-manage diabetes?: No    Access to Mass Media & Technology  Does the patient have access to any of the following devices or technologies?: Smart phone, Internet Access  Media or technology needs impacting ability to self-manage diabetes?: No    Cognitive/Behavioral Health  Alert and Oriented: Yes  Difficulty Thinking: No  Requires Prompting: No  Requires assistance for routine expression?: No  Cognitive or behavioral barriers impacting ability to self-manage diabetes?: No    Culture/Latter day  Culture or Pentecostal beliefs that may impact ability to access healthcare: No    Communication  Language preference: English  Hearing Problems: No  Vision Problems: No  Communication needs impacting ability to self-manage diabetes?: No    Health Literacy  Preferred Learning Method: Face to Face, Demonstration, Reading Materials, Hands On  How often do you need to have someone help you read instructions, pamphlets, or written material from your doctor or pharmacy?: Never  Health literacy needs impacting ability to self-manage diabetes?: No                 Diabetes Self-Management Care Plan:    Today's Diabetes Self-Management Care Plan was developed with Ravi's input. Ravi has agreed to work toward the following goal(s) to improve his/her overall diabetes control.      Care Plan: Diabetes Management   Updates made since 1/30/2023 12:00 AM        Problem: Blood Glucose Self-Monitoring         Long-Range Goal: Patient will use CGM sensor values and arrows to predict and treat hypo- and hyper- glycemia.    Start Date: 3/1/2023   Expected End Date: 3/31/2024   Priority: High   Barriers: Knowledge deficit   Note:    FREESTYLE MINDY 3 CGM TRAINING     Patient referred to Diabetes Management today for Freestyle Mindy 3 continuous glucose monitor system training.      Patient will be using the Freestyle Gin 3 marion.  Freestyle Gin 3 personal account created today.     TRAINING: FreeStyle Gin 3 training - reviewed the following with patient:   no fingersticks required but if feeling s/s that do not match with SG reading or in event of hypoglycemia confirm with fingerstick,   high doses of Vit C can cause false readings  sensor is 14 day wear  FDA approved for back of the arm wear  site rotation  1 hour warm-up period     Pt successfully set up reader and placed sensor on back of upper right arm.   Reviewed additional adhesive options if having any difficulty with sensor staying on.         Patient's personal account linked to [facility] account for ongoing monitoring with the [employer] practice ID [id number].           Reviewed timing and MOA of long and rapid acting insulins.     Emphasized need to take novolog 15 minutes prior to eating.     Also reviewed basic MNT and encouraged adding protein at breakfast!       Task: Provided patient with a sample meter today and sent Rx request to provider to send to patients pharmacy. Completed 3/1/2023        Task: Reviewed the importance of self-monitoring blood glucose and keeping logs. Completed 3/1/2023        Task: Instructed on how to self-monitor blood glucose using a Freestyle Gin 3 CGM Completed 3/1/2023        Task: Provided patient with blood glucose logs, reviewed appropriate timing and frequency to SMBG, education on parameters on when to notify provider and advised patient to bring logs to all appts with PCP/Endocrinologist/Diabetes Care Specialist. Completed 3/1/2023        Task: Discussed ways to minimize pain when monitoring blood glucose. Completed 3/1/2023                  Follow Up Plan     F/u in 3 months          Today's care plan and follow up schedule was discussed with patient.  Ravi verbalized understanding of the care plan, goals, and agrees to follow up plan.        The patient was encouraged to  communicate with his/her health care provider/physician and care team regarding his/her condition(s) and treatment.  I provided the patient with my contact information today and encouraged to contact me via phone or [facility] as needed.           Length of Visit   Total Time: 60 Minutes

## 2023-03-14 NOTE — TELEPHONE ENCOUNTER
----- Message from Tita Delatorre sent at 3/14/2023  3:33 PM CDT -----  Contact: Self/[phone]  Requesting an RX refill or new RX.  Is this a refill or new RX: New  RX name and strength :  blood-glucose sensor Kayla  Is this a 30 day or 90 day RX:   Pharmacy name and phone # :  [id number] - [address] [address]  Phone: [phone] Fax: [phone]        The doctors have asked that we provide their patients with the following 2 reminders -- prescription refills can take up to 72 hours, and a friendly reminder that in the future you can use your [facility] account to request refills: pt stated that his Part B insurance is not covered at the [facility]  and needs the rx sent to [facility] asap

## 2023-03-15 ENCOUNTER — TELEPHONE (OUTPATIENT)
Dept: INTERNAL MEDICINE | Facility: CLINIC | Age: 68
End: 2023-03-15
Payer: MEDICARE

## 2023-03-15 NOTE — TELEPHONE ENCOUNTER
PA for  FreeStyle Gin 3 Sensor started   Decision to be sent via fax            Ravi Zamorano Key: [id number] - PA Case ID: [id number]   Status  Sent to [facility]  Drug  FreeStyle Gin 3 Sensor  Form  [employer] Electronic PA Form

## 2023-03-15 NOTE — TELEPHONE ENCOUNTER
----- Message from Yvonne Galicia sent at 3/15/2023 11:05 AM CDT -----  Contact: pt  Pt calling to advise ilya JACOBSEN is needed for maycol 3 senor , please all to discuss further       Confirmed contact below:  Contact Name:Ravi Zamorano  Phone Number: [phone]

## 2023-03-22 ENCOUNTER — TELEPHONE (OUTPATIENT)
Dept: INTERNAL MEDICINE | Facility: CLINIC | Age: 68
End: 2023-03-22
Payer: MEDICARE

## 2023-03-22 DIAGNOSIS — I15.0 RENOVASCULAR HYPERTENSION: ICD-10-CM

## 2023-03-22 NOTE — TELEPHONE ENCOUNTER
Key: [id number] - PA Case ID: [id number]- has received an Unfavorable outcome for FreeStyle Gin 2 Sensor / kit.  It was denied on 03/15/2023.   Reached out to [facility] to check status and they clarified pt's insurance is [employer] and they will reach out to him to very the rest of the billing info.  Notified pt of that update.  He said he did get a text noting that also.    An eAppeal may be available. View the bottom of the request to see if an eAppeal is available along with any additional information provided by [employer].      ----- Message from Jacinda Rajput MA sent at 3/22/2023  2:55 PM CDT -----  Contact: [phone]    ----- Message -----  From: Galina Padilla  Sent: 3/22/2023   2:50 PM CDT  To: John Russo Staff    Patient is calling to find out the status of the PA  on the PA for  FreeStyle Gin 3 Sensor , please call and advise.

## 2023-03-22 NOTE — TELEPHONE ENCOUNTER
No new care gaps identified.  [facility] Embedded Care Gaps. Reference number: [id number]. 3/22/2023   2:44:09 PM CDT

## 2023-03-23 RX ORDER — CHLORTHALIDONE 25 MG/1
50 TABLET ORAL DAILY
Qty: 90 TABLET | Refills: 3 | Status: SHIPPED | OUTPATIENT
Start: 2023-03-23 | End: 2023-04-27 | Stop reason: SDUPTHER

## 2023-03-23 RX ORDER — CARVEDILOL 25 MG/1
25 TABLET ORAL 2 TIMES DAILY
Qty: 180 TABLET | Refills: 3 | Status: SHIPPED | OUTPATIENT
Start: 2023-03-23 | End: 2024-04-01 | Stop reason: SDUPTHER

## 2023-04-03 ENCOUNTER — TELEPHONE (OUTPATIENT)
Dept: ENDOCRINOLOGY | Facility: CLINIC | Age: 68
End: 2023-04-03
Payer: MEDICARE

## 2023-04-03 NOTE — TELEPHONE ENCOUNTER
----- Message from Belén Suarez sent at 4/3/2023  3:46 PM CDT -----  Regarding: pt advice  Contact: [phone]  Ravi Zamorano calling regarding Patient Advice (message) for #Leabre tube. He states [facility] was told to reach out to Dr. Chun when he received it. He says that he will be at main campus tomorrow and would like to know if he can stop by to get help with it. please call

## 2023-04-03 NOTE — PROGRESS NOTES
Mr. Zamorano is a patient of Dr. Bowden and was last seen in clinic 11/15/2021.      Subjective:   Patient ID:  Ravi Zamorano is a 67 y.o. male who presents for follow up of Atrial Fibrillation  .     HPI:    Mr. Zamorano is a 67 y.o. male with DM, kidney tx (2016), HFpEF, AF here for follow up.     Background:    Ravi Zamorano has a past medical history of DM on insulin, CKD2, history of kidney transplant in 2016 (Cr 1.5 in 2/2021), and diastolic dysfunction, who was diagnosed with rate controlled AF in 8/2019, manifesting as worsening DOSS. An echo done in 8/2019 showed an EF of 55% and severe LAE (MINA 54.3 mL/m2). He underwent DCCV in 8/2010, and was started on flecainide 100 mg bid post-procedure.    An echo in 6/2021 showed an EF of 55%. A 2 week [employer] DX monitor done in 7/2019 showed no arrhythmias.    Apart from some occasional lightheadedness Mr. Zamorano feels well, and denies recurrent arrhythmias.    My interpretation of today's ECG is sinus bradycardia at 50 bpm with a QRSd of 120 ms.  In summary, Ravi Zamorano is a 66 year old male with persistent AF, who underwent DCCV and flecainide initiation in 8/2019. He is maintaining sinus rhythm based on symptoms and normal 2 week event monitor in 7/2021. He is anticoagulated on eliquis.    The plan is to hold the course and see me again in 1 year.    Update (04/04/2023):    Hospitalized with covid 7/2022- echo at the time showed preserved LVEF.    Today he reports worsening DOSS and LH. One episode of syncope in January after being outside in the heat. He has reduced his chlorthalidone to 25mg daily for a couple of months due to low BP. No palps, CP.    He is currently taking eliquis 5mg BID for stroke prophylaxis and denies significant bleeding episodes. He is currently being treated with flecainide 100mg BID for rhythm control and carvedilol 25mg BID for HR control.  Kidney function is low, with a creatinine of 1.9 on 3/1/2023.    I have personally  reviewed the patient's EKG today, which shows sinus bradycardia with 1st deg AVB and IVCD at 50bpm. NC interval is 352. QRS is 134. QT is 462.    Relevant Cardiac Test Results:    2D Echo (7/2022):  The left ventricle is normal in size with eccentric hypertrophy and normal systolic function. The estimated ejection fraction is 65%.  Normal right ventricular size with normal right ventricular systolic function.  Left ventricular diastolic dysfunction.  Biatrial enlargement.  Mild aortic regurgitation.  PA systolic pressure is at elast 39 mmHg. The IVC is not visualized.    Current Outpatient Medications   Medication Sig    apixaban (ELIQUIS) 5 mg Tab Take 1 tablet (5 mg total) by mouth 2 (two) times daily.    aspirin 81 MG Chew Take 81 mg by mouth once daily.    atorvastatin (LIPITOR) 40 MG tablet Take 1 tablet (40 mg total) by mouth once daily.    blood-glucose sensor Kayla Gin 3, use as directed, change every 14 days , e 11.65    carvediloL (COREG) 25 MG tablet Take 1 tablet (25 mg total) by mouth 2 (two) times daily.    chlorthalidone (HYGROTEN) 25 MG Tab Take 2 tablets (50 mg total) by mouth once daily.    empagliflozin (JARDIANCE) 10 mg tablet Take 1 tablet (10 mg total) by mouth once daily.    famotidine (PEPCID) 20 MG tablet Take 1 tablet (20 mg total) by mouth every evening.    ferrous sulfate (SLOW FE) 142 mg (45 mg iron) TbSR Take 1 tablet (142 mg total) by mouth Daily.    flecainide (TAMBOCOR) 100 MG Tab Take 1 tablet (100 mg total) by mouth every 12 (twelve) hours.    gabapentin (NEURONTIN) 300 MG capsule Take 1 capsule by mouth in the morning, 1 capsule midday, and 3 capsules at night.    hydrALAZINE (APRESOLINE) 25 MG tablet Take 1 tablet (25 mg total) by mouth every 12 (twelve) hours.    insulin (LANTUS SOLOSTAR U-100 INSULIN) glargine 100 units/mL SubQ pen Inject 33-35 units subcutaneously at night    insulin aspart U-100 (NOVOLOG) 100 unit/mL (3 mL) InPn pen Inject 18 units w/ breakfast, 15 units w/  "lunch and dinner plus scale 150-200 +2, 201-250 +4, 251-300 +6, 301-350 +8. Max 78 units daily    meclizine (ANTIVERT) 25 mg tablet Take 1 tablet (25 mg total) by mouth 3 (three) times daily as needed for Dizziness.    melatonin 5 mg Tab Take 1 tablet (5 mg total) by mouth every evening.    mycophenolate (CELLCEPT) 250 mg Cap Take 4 capsules (1,000 mg total) by mouth 2 (two) times daily.    pen needle, diabetic (BD ULTRA-FINE LO PEN NEEDLE) 32 gauge x 5/32" Ndle USE TO ADMINISTER INSULIN 4 TIMES DAILY.    predniSONE (DELTASONE) 5 MG tablet Take 1 tablet (5 mg total) by mouth once daily.    spironolactone (ALDACTONE) 25 MG tablet Take 1 tablet (25 mg total) by mouth once daily.    tacrolimus (PROGRAF) 0.5 MG Cap Take 1 capsule (0.5 mg total) by mouth every 12 (twelve) hours.    valsartan (DIOVAN) 320 MG tablet Take 1 tablet (320 mg total) by mouth once daily.     No current facility-administered medications for this visit.       Review of Systems   Constitutional: Positive for malaise/fatigue.   Cardiovascular:  Positive for dyspnea on exertion, near-syncope and syncope. Negative for chest pain, irregular heartbeat, leg swelling and palpitations.   Respiratory:  Negative for shortness of breath.    Hematologic/Lymphatic: Negative for bleeding problem.   Skin:  Negative for rash.   Musculoskeletal:  Negative for myalgias.   Gastrointestinal:  Negative for hematemesis, hematochezia and nausea.   Genitourinary:  Negative for hematuria.   Neurological:  Positive for light-headedness.   Psychiatric/Behavioral:  Negative for altered mental status.    Allergic/Immunologic: Negative for persistent infections.     Objective:          BP (!) 116/55   Pulse (!) 50   Ht 6' 1" (1.854 m)   Wt 90.3 kg (199 lb 1.2 oz)   BMI 26.26 kg/m²     Physical Exam  Vitals and nursing note reviewed.   Constitutional:       Appearance: Normal appearance. He is well-developed.   HENT:      Head: Normocephalic.      Nose: Nose normal. "   Eyes:      Pupils: Pupils are equal, round, and reactive to light.   Cardiovascular:      Rate and Rhythm: Regular rhythm. Bradycardia present.   Pulmonary:      Effort: No respiratory distress.      Breath sounds: Normal breath sounds.   Musculoskeletal:         General: Normal range of motion.   Skin:     General: Skin is warm and dry.      Findings: No erythema.   Neurological:      Mental Status: He is alert and oriented to person, place, and time.   Psychiatric:         Speech: Speech normal.         Behavior: Behavior normal.         Lab Results   Component Value Date     03/01/2023     03/01/2023    K 4.3 03/01/2023    K 4.3 03/01/2023    MG 1.7 11/11/2022    BUN 24 (H) 03/01/2023    BUN 24 (H) 03/01/2023    CREATININE 1.9 (H) 03/01/2023    CREATININE 1.9 (H) 03/01/2023    ALT <5 (L) 03/01/2023    AST 11 03/01/2023    HGB 10.0 (L) 11/11/2022    HCT 32.8 (L) 11/11/2022    HCT 31 (L) 11/05/2016    TSH 1.143 03/15/2019    LDLCALC 71.4 03/01/2023             Assessment:     1. Persistent atrial fibrillation    2. Primary hypertension    3. Chronic diastolic congestive heart failure    4. Anticoagulant long-term use    5. Encounter for monitoring flecainide therapy    6. Stage 3b chronic kidney disease        Plan:     In summary, Mr. Zamorano is a 67 y.o. male with DM, kidney tx (2016), HFpEF, AF here for follow up.   He is maintainins SR on flecainide but is reporting more LH and DOSS. Franky with increased WA and QRS on ECG. Will reduce flecainide to 50mg BID and check Holter to evaluate rates. If intervals or rates do not improve, will stop flecainide. We discussed PVI if AF recurs off AAD. CHADSVASc 3 on eliquis.     Reduce flecainide to 1/2 tablet (50mg) twice daily  Holter monitor in 2 weeks  Return to clinic in 4 weeks.       *A copy of this note has been sent to Dr. Bowden*    Follow up in about 4 weeks (around  5/2/2023).    ------------------------------------------------------------------    REYES Flynn, NP-C  Cardiac Electrophysiology

## 2023-04-04 ENCOUNTER — OFFICE VISIT (OUTPATIENT)
Dept: ELECTROPHYSIOLOGY | Facility: CLINIC | Age: 68
End: 2023-04-04
Payer: MEDICARE

## 2023-04-04 ENCOUNTER — CLINICAL SUPPORT (OUTPATIENT)
Dept: DIABETES | Facility: CLINIC | Age: 68
End: 2023-04-04
Payer: MEDICARE

## 2023-04-04 ENCOUNTER — HOSPITAL ENCOUNTER (OUTPATIENT)
Dept: CARDIOLOGY | Facility: CLINIC | Age: 68
Discharge: HOME | End: 2023-04-04
Payer: MEDICARE

## 2023-04-04 VITALS
SYSTOLIC BLOOD PRESSURE: 116 MMHG | HEIGHT: 73 IN | DIASTOLIC BLOOD PRESSURE: 55 MMHG | HEART RATE: 50 BPM | BODY MASS INDEX: 26.38 KG/M2 | WEIGHT: 199.06 LBS

## 2023-04-04 DIAGNOSIS — E11.22 TYPE 2 DIABETES MELLITUS WITH STAGE 3A CHRONIC KIDNEY DISEASE, WITH LONG-TERM CURRENT USE OF INSULIN: Primary | ICD-10-CM

## 2023-04-04 DIAGNOSIS — Z79.899 ENCOUNTER FOR MONITORING FLECAINIDE THERAPY: ICD-10-CM

## 2023-04-04 DIAGNOSIS — Z51.81 ENCOUNTER FOR MONITORING FLECAINIDE THERAPY: ICD-10-CM

## 2023-04-04 DIAGNOSIS — Z79.4 TYPE 2 DIABETES MELLITUS WITH STAGE 3A CHRONIC KIDNEY DISEASE, WITH LONG-TERM CURRENT USE OF INSULIN: Primary | ICD-10-CM

## 2023-04-04 DIAGNOSIS — Z79.01 ANTICOAGULANT LONG-TERM USE: ICD-10-CM

## 2023-04-04 DIAGNOSIS — I49.8 OTHER CARDIAC ARRHYTHMIA: ICD-10-CM

## 2023-04-04 DIAGNOSIS — N18.32 STAGE 3B CHRONIC KIDNEY DISEASE: ICD-10-CM

## 2023-04-04 DIAGNOSIS — I48.19 PERSISTENT ATRIAL FIBRILLATION: Primary | ICD-10-CM

## 2023-04-04 DIAGNOSIS — N18.31 TYPE 2 DIABETES MELLITUS WITH STAGE 3A CHRONIC KIDNEY DISEASE, WITH LONG-TERM CURRENT USE OF INSULIN: Primary | ICD-10-CM

## 2023-04-04 DIAGNOSIS — I50.32 CHRONIC DIASTOLIC CONGESTIVE HEART FAILURE: ICD-10-CM

## 2023-04-04 DIAGNOSIS — I10 PRIMARY HYPERTENSION: ICD-10-CM

## 2023-04-04 PROCEDURE — 93010 RHYTHM STRIP: ICD-10-PCS | Mod: S$GLB,,, | Performed by: INTERNAL MEDICINE

## 2023-04-04 PROCEDURE — 3078F DIAST BP <80 MM HG: CPT | Mod: CPTII,S$GLB,, | Performed by: NURSE PRACTITIONER

## 2023-04-04 PROCEDURE — 3051F HG A1C>EQUAL 7.0%<8.0%: CPT | Mod: CPTII,S$GLB,, | Performed by: NURSE PRACTITIONER

## 2023-04-04 PROCEDURE — G0108 PR DIAB MANAGE TRN  PER INDIV: ICD-10-PCS | Mod: S$GLB,,, | Performed by: INTERNAL MEDICINE

## 2023-04-04 PROCEDURE — 3066F PR DOCUMENTATION OF TREATMENT FOR NEPHROPATHY: ICD-10-PCS | Mod: CPTII,S$GLB,, | Performed by: NURSE PRACTITIONER

## 2023-04-04 PROCEDURE — 1101F PT FALLS ASSESS-DOCD LE1/YR: CPT | Mod: CPTII,S$GLB,, | Performed by: NURSE PRACTITIONER

## 2023-04-04 PROCEDURE — 93010 ELECTROCARDIOGRAM REPORT: CPT | Mod: S$GLB,,, | Performed by: INTERNAL MEDICINE

## 2023-04-04 PROCEDURE — 93005 ELECTROCARDIOGRAM TRACING: CPT | Mod: S$GLB,,, | Performed by: INTERNAL MEDICINE

## 2023-04-04 PROCEDURE — 1126F AMNT PAIN NOTED NONE PRSNT: CPT | Mod: CPTII,S$GLB,, | Performed by: NURSE PRACTITIONER

## 2023-04-04 PROCEDURE — 3074F SYST BP LT 130 MM HG: CPT | Mod: CPTII,S$GLB,, | Performed by: NURSE PRACTITIONER

## 2023-04-04 PROCEDURE — 93005 RHYTHM STRIP: ICD-10-PCS | Mod: S$GLB,,, | Performed by: INTERNAL MEDICINE

## 2023-04-04 PROCEDURE — 99999 PR PBB SHADOW E&M-EST. PATIENT-LVL V: ICD-10-PCS | Mod: PBBFAC,,, | Performed by: NURSE PRACTITIONER

## 2023-04-04 PROCEDURE — 3062F POS MACROALBUMINURIA REV: CPT | Mod: CPTII,S$GLB,, | Performed by: NURSE PRACTITIONER

## 2023-04-04 PROCEDURE — 1126F PR PAIN SEVERITY QUANTIFIED, NO PAIN PRESENT: ICD-10-PCS | Mod: CPTII,S$GLB,, | Performed by: NURSE PRACTITIONER

## 2023-04-04 PROCEDURE — 4010F PR ACE/ARB THEARPY RXD/TAKEN: ICD-10-PCS | Mod: CPTII,S$GLB,, | Performed by: NURSE PRACTITIONER

## 2023-04-04 PROCEDURE — 1160F RVW MEDS BY RX/DR IN RCRD: CPT | Mod: CPTII,S$GLB,, | Performed by: NURSE PRACTITIONER

## 2023-04-04 PROCEDURE — 3288F FALL RISK ASSESSMENT DOCD: CPT | Mod: CPTII,S$GLB,, | Performed by: NURSE PRACTITIONER

## 2023-04-04 PROCEDURE — 3078F PR MOST RECENT DIASTOLIC BLOOD PRESSURE < 80 MM HG: ICD-10-PCS | Mod: CPTII,S$GLB,, | Performed by: NURSE PRACTITIONER

## 2023-04-04 PROCEDURE — 4010F ACE/ARB THERAPY RXD/TAKEN: CPT | Mod: CPTII,S$GLB,, | Performed by: NURSE PRACTITIONER

## 2023-04-04 PROCEDURE — 3072F PR LOW RISK FOR RETINOPATHY: ICD-10-PCS | Mod: CPTII,S$GLB,, | Performed by: NURSE PRACTITIONER

## 2023-04-04 PROCEDURE — 3072F LOW RISK FOR RETINOPATHY: CPT | Mod: CPTII,S$GLB,, | Performed by: NURSE PRACTITIONER

## 2023-04-04 PROCEDURE — 3008F BODY MASS INDEX DOCD: CPT | Mod: CPTII,S$GLB,, | Performed by: NURSE PRACTITIONER

## 2023-04-04 PROCEDURE — 3008F PR BODY MASS INDEX (BMI) DOCUMENTED: ICD-10-PCS | Mod: CPTII,S$GLB,, | Performed by: NURSE PRACTITIONER

## 2023-04-04 PROCEDURE — 1159F PR MEDICATION LIST DOCUMENTED IN MEDICAL RECORD: ICD-10-PCS | Mod: CPTII,S$GLB,, | Performed by: NURSE PRACTITIONER

## 2023-04-04 PROCEDURE — 99999 PR PBB SHADOW E&M-EST. PATIENT-LVL V: CPT | Mod: PBBFAC,,, | Performed by: NURSE PRACTITIONER

## 2023-04-04 PROCEDURE — 1159F MED LIST DOCD IN RCRD: CPT | Mod: CPTII,S$GLB,, | Performed by: NURSE PRACTITIONER

## 2023-04-04 PROCEDURE — 3066F NEPHROPATHY DOC TX: CPT | Mod: CPTII,S$GLB,, | Performed by: NURSE PRACTITIONER

## 2023-04-04 PROCEDURE — 3074F PR MOST RECENT SYSTOLIC BLOOD PRESSURE < 130 MM HG: ICD-10-PCS | Mod: CPTII,S$GLB,, | Performed by: NURSE PRACTITIONER

## 2023-04-04 PROCEDURE — G0108 DIAB MANAGE TRN  PER INDIV: HCPCS | Mod: S$GLB,,, | Performed by: INTERNAL MEDICINE

## 2023-04-04 PROCEDURE — 3051F PR MOST RECENT HEMOGLOBIN A1C LEVEL 7.0 - < 8.0%: ICD-10-PCS | Mod: CPTII,S$GLB,, | Performed by: NURSE PRACTITIONER

## 2023-04-04 PROCEDURE — 1101F PR PT FALLS ASSESS DOC 0-1 FALLS W/OUT INJ PAST YR: ICD-10-PCS | Mod: CPTII,S$GLB,, | Performed by: NURSE PRACTITIONER

## 2023-04-04 PROCEDURE — 99214 OFFICE O/P EST MOD 30 MIN: CPT | Mod: S$GLB,,, | Performed by: NURSE PRACTITIONER

## 2023-04-04 PROCEDURE — 1160F PR REVIEW ALL MEDS BY PRESCRIBER/CLIN PHARMACIST DOCUMENTED: ICD-10-PCS | Mod: CPTII,S$GLB,, | Performed by: NURSE PRACTITIONER

## 2023-04-04 PROCEDURE — 99214 PR OFFICE/OUTPT VISIT, EST, LEVL IV, 30-39 MIN: ICD-10-PCS | Mod: S$GLB,,, | Performed by: NURSE PRACTITIONER

## 2023-04-04 PROCEDURE — 3288F PR FALLS RISK ASSESSMENT DOCUMENTED: ICD-10-PCS | Mod: CPTII,S$GLB,, | Performed by: NURSE PRACTITIONER

## 2023-04-04 PROCEDURE — 3062F PR POS MACROALBUMINURIA RESULT DOCUMENTED/REVIEW: ICD-10-PCS | Mod: CPTII,S$GLB,, | Performed by: NURSE PRACTITIONER

## 2023-04-04 RX ORDER — FLECAINIDE ACETATE 50 MG/1
50 TABLET ORAL 2 TIMES DAILY
Qty: 120 TABLET | Refills: 1 | Status: SHIPPED | OUTPATIENT
Start: 2023-04-04 | End: 2023-10-04 | Stop reason: SDUPTHER

## 2023-04-04 NOTE — PATIENT INSTRUCTIONS
Reduce flecainide to 1/2 tablet (50mg) twice daily  Holter monitor in 2 weeks  Return to clinic in 4 weeks.

## 2023-04-06 NOTE — PROGRESS NOTES
Diabetes Care Specialist Progress Note  Author: Leticia Ramos RN, CDE  Date: 4/4/2023    Program Intake  Reason for Diabetes Program Visit:: Intervention  Type of Intervention:: Individual  Individual: Device Training  Device Training: Personal CGM  Current diabetes risk level:: low  In the last 12 months, have you:: none  Permission to speak with others about care:: yes (spouse present at visit)    Lab Results   Component Value Date    HGBA1C 7.2 (H) 03/01/2023       Additional Social History    Support  Does anyone support you with your diabetes care?:  (pt is primary caregiver)    Access to Mass Media & Technology  Does the patient have access to any of the following devices or technologies?: Smart phone, Internet Access      Health Literacy  Preferred Learning Method: Face to Face, Demonstration, Reading Materials, Hands On       Assessment Summary and Plan    Based on today's diabetes care assessment, the following areas of need were identified:      Social 3/1/2023   Support No   Access to Mass Media/Tech No   Cognitive/Behavioral Health No   Culture/Mu-ism No   Communication No   Health Literacy No        Clinical 3/1/2023   Medication Adherence No   Lab Compliance No   Nutritional Status No      Today's interventions were provided through individual discussion, instruction, and written materials were provided.      Patient verbalized understanding of instruction and written materials.  Pt was able to return back demonstration of instructions today. Patient understood key points, needs reinforcement and further instruction.     Diabetes Self-Management Care Plan:    Today's Diabetes Self-Management Care Plan was developed with Ravi's input. Ravi has agreed to work toward the following goal(s) to improve his/her overall diabetes control.      Care Plan: Diabetes Management   Updates made since 3/7/2023 12:00 AM        Problem: Blood Glucose Self-Monitoring         Long-Range Goal: Patient will use CGM  sensor values and arrows to predict and treat hypo- and hyper- glycemia.    Start Date: 3/1/2023   Expected End Date: 3/31/2024   Priority: High   Barriers: Knowledge deficit   Note:    Gin 3    Update to care planning 4/4/2023:  patient seen for Gin 2 training.  Was given a Gin 3 sample, but not covered by insurance. Patient was instructed how to use Gin 2, connected to marion on phone.  Advised to scan sensor with phone at least every 6 hours.  Explained that readings will not appear on his phone unless he scans.          Follow Up Plan     No follow-ups on file.    Today's care plan and follow up schedule was discussed with patient.  Ravi verbalized understanding of the care plan, goals, and agrees to follow up plan.        The patient was encouraged to communicate with his/her health care provider/physician and care team regarding his/her condition(s) and treatment.  I provided the patient with my contact information today and encouraged to contact me via phone or [facility] Patient Portal as needed.     Length of Visit   Total Time: 30 Minutes

## 2023-04-17 ENCOUNTER — CLINICAL SUPPORT (OUTPATIENT)
Dept: CARDIOLOGY | Facility: HOSPITAL | Age: 68
End: 2023-04-17
Attending: NURSE PRACTITIONER
Payer: MEDICARE

## 2023-04-17 DIAGNOSIS — I48.19 PERSISTENT ATRIAL FIBRILLATION: ICD-10-CM

## 2023-04-17 DIAGNOSIS — Z79.899 ENCOUNTER FOR MONITORING FLECAINIDE THERAPY: ICD-10-CM

## 2023-04-17 DIAGNOSIS — Z51.81 ENCOUNTER FOR MONITORING FLECAINIDE THERAPY: ICD-10-CM

## 2023-04-17 PROCEDURE — 93227 XTRNL ECG REC<48 HR R&I: CPT | Mod: ,,, | Performed by: STUDENT IN AN ORGANIZED HEALTH CARE EDUCATION/TRAINING PROGRAM

## 2023-04-17 PROCEDURE — 93227 HOLTER MONITOR - 48 HOUR (CUPID ONLY): ICD-10-PCS | Mod: ,,, | Performed by: STUDENT IN AN ORGANIZED HEALTH CARE EDUCATION/TRAINING PROGRAM

## 2023-04-17 PROCEDURE — 93225 XTRNL ECG REC<48 HRS REC: CPT

## 2023-04-20 ENCOUNTER — TELEPHONE (OUTPATIENT)
Dept: CARDIOLOGY | Facility: HOSPITAL | Age: 68
End: 2023-04-20
Payer: MEDICARE

## 2023-04-20 LAB
OHS CV EVENT MONITOR DAY: 0
OHS CV HOLTER LENGTH DECIMAL HOURS: 47.98
OHS CV HOLTER LENGTH HOURS: 47
OHS CV HOLTER LENGTH MINUTES: 59
OHS CV HOLTER SINUS AVERAGE HR: 55
OHS CV HOLTER SINUS MAX HR: 90
OHS CV HOLTER SINUS MIN HR: 37

## 2023-04-25 ENCOUNTER — TELEPHONE (OUTPATIENT)
Dept: INTERNAL MEDICINE | Facility: CLINIC | Age: 68
End: 2023-04-25
Payer: MEDICARE

## 2023-04-25 NOTE — TELEPHONE ENCOUNTER
Taking off holter should not have caused this. Recommend he reach out to cardiology to discuss.   Possibly had a mild illness?  What medication dosages is he taking now?

## 2023-04-25 NOTE — TELEPHONE ENCOUNTER
He stated that he decreased the dosage for the chlorthalidone from 2 tabs (50mg) to 1 tab (25mg).    When he was taking the 2 tabs he stated that his top number would be as low as 70.    When he decreased it to 1 tab, the top number ran around 180 - 190    He used his wife BP cuff this morning to check and it read about 135/50-60    Maybe is cuff is not calibrated?

## 2023-04-25 NOTE — TELEPHONE ENCOUNTER
Can he come do a nurse visit and check the cuff then? He has cardiology appt in 2 weeks, can see him sooner if readings don't stabilize

## 2023-04-25 NOTE — TELEPHONE ENCOUNTER
----- Message from Sarah Wu sent at 4/24/2023 10:22 AM CDT -----  Contact: Patient @ [phone]  Good Morning  Patient want to talk about issues he had after wearing holier monitor . Patient passed out after the 24 hrs       Please call and advise

## 2023-04-25 NOTE — TELEPHONE ENCOUNTER
Pt took off the holter monitor and returned it and the next day pt woke up out the bed and was feeling lightheaded and had the chills.   BP has been dropping so he decreased his dosage for his BP meds   BP checked this morning 137/50  Pulse rate running around 50  Pt is feeling fine today and yesterday.  Pt is confused as to why that happened.  Not sure why it is happening. Wondering if it could have something to do when he took off the holter monitor.     Pt would like some advise on this

## 2023-04-27 ENCOUNTER — CLINICAL SUPPORT (OUTPATIENT)
Dept: INTERNAL MEDICINE | Facility: CLINIC | Age: 68
End: 2023-04-27
Payer: MEDICARE

## 2023-04-27 ENCOUNTER — TELEPHONE (OUTPATIENT)
Dept: INTERNAL MEDICINE | Facility: CLINIC | Age: 68
End: 2023-04-27

## 2023-04-27 VITALS — SYSTOLIC BLOOD PRESSURE: 120 MMHG | OXYGEN SATURATION: 97 % | HEART RATE: 73 BPM | DIASTOLIC BLOOD PRESSURE: 58 MMHG

## 2023-04-27 DIAGNOSIS — I15.0 RENOVASCULAR HYPERTENSION: ICD-10-CM

## 2023-04-27 PROCEDURE — 99999 PR PBB SHADOW E&M-EST. PATIENT-LVL II: CPT | Mod: PBBFAC,,,

## 2023-04-27 PROCEDURE — 99999 PR PBB SHADOW E&M-EST. PATIENT-LVL II: ICD-10-PCS | Mod: PBBFAC,,,

## 2023-04-27 RX ORDER — CHLORTHALIDONE 25 MG/1
50 TABLET ORAL DAILY
Qty: 90 TABLET | Refills: 3
Start: 2023-04-27 | End: 2023-05-09

## 2023-04-27 NOTE — TELEPHONE ENCOUNTER
Nurse BP check. BP is 120/58. He is complaining of being light headed/dizzy when he stands up.   Decreased chlorthalidone to 25mg daily previously. Please hold chlorthalidone for now.  Flecanide recently decreased by cards.  Follow up 2 week visit with me or ANN-MARIE Mejia.

## 2023-04-27 NOTE — PROGRESS NOTES
Ravi Zamorano 67 y.o. male is here for Blood Pressure check. in person    Manual Blood pressure reading was  120/58, Pulse 73. (Checked at the beginning of the visit)    If high, was it repeated after 15 minutes? yes    Pt's Home blood pressure machine read in office 117/59,     Diagnosed with Hypertension yes.    Patient took blood pressure medication today yes.  Last dose of blood pressure medication was taken at 8ma. Patient took Valsartan 320mg, Spironolactone 25mg, Carvedolol 25mg, Chlorthalidone 25mg ..     All Medications and OTC medication updated yes    Does patient have record of home blood pressure readings / Blood Pressure Log no.     Does the pt have any complaints today in regards to their blood pressure medication? yes. Complains of lightheadedness when standing up . Patient is symptomatic.     Were you sitting still for 5-10 minutes prior to taking your Blood pressure? yes     Has your blood pressure monitor ever been checked? yes When was last time we checked your blood pressure monitor? 4/27/2023    Updated vitals no        Creatinine   Date Value Ref Range Status   03/01/2023 1.9 (H) 0.5 - 1.4 mg/dL Final   03/01/2023 1.9 (H) 0.5 - 1.4 mg/dL Final     Sodium   Date Value Ref Range Status   03/01/2023 138 136 - 145 mmol/L Final   03/01/2023 138 136 - 145 mmol/L Final     Potassium   Date Value Ref Range Status   03/01/2023 4.3 3.5 - 5.1 mmol/L Final   03/01/2023 4.3 3.5 - 5.1 mmol/L Final

## 2023-05-02 NOTE — PLAN OF CARE
Detail Level: Zone
Problem: Adult Inpatient Plan of Care  Goal: Plan of Care Review  Outcome: Ongoing (interventions implemented as appropriate)  No acute events throughout night. Pt AAO X 4; able to express needs. No c/o pain at this time.  Visi monitoring. Blood pressure remains elevated.  Blood glucose runs high.  Non-compliant with medications at home for HTN and blood glucose.    PRN meds ordered if needed.   Safety maintained.  Bed in low position,call  light in reach.    Will continue to monitor      
Detail Level: Generalized

## 2023-05-02 NOTE — PROGRESS NOTES
Mr. Zamorano is a patient of Dr. Bowden and was last seen in clinic 4/4/2023.      Subjective:   Patient ID:  Ravi Zamorano is a 67 y.o. male who presents for follow up of Atrial Fibrillation  .     HPI:    Mr. Zamorano is a 67 y.o. male with DM, kidney tx (2016), HFpEF, AF here for follow up.     Background:    Ravi Zamorano has a past medical history of DM on insulin, CKD2, history of kidney transplant in 2016 (Cr 1.5 in 2/2021), and diastolic dysfunction, who was diagnosed with rate controlled AF in 8/2019, manifesting as worsening DOSS. An echo done in 8/2019 showed an EF of 55% and severe LAE (MINA 54.3 mL/m2). He underwent DCCV in 8/2010, and was started on flecainide 100 mg bid post-procedure.    An echo in 6/2021 showed an EF of 55%. A 2 week Chaordixy DX monitor done in 7/2019 showed no arrhythmias.    Apart from some occasional lightheadedness Mr. Zamorano feels well, and denies recurrent arrhythmias.    My interpretation of today's ECG is sinus bradycardia at 50 bpm with a QRSd of 120 ms.  In summary, Ravi Zamorano is a 66 year old male with persistent AF, who underwent DCCV and flecainide initiation in 8/2019. He is maintaining sinus rhythm based on symptoms and normal 2 week event monitor in 7/2021. He is anticoagulated on eliquis.    Hospitalized with covid 7/2022- echo at the time showed preserved LVEF.    4/4/2023: He is maintainins SR on flecainide but is reporting more LH and DOSS. Franky with increased GA and QRS on ECG. Will reduce flecainide to 50mg BID and check Holter to evaluate rates. If intervals or rates do not improve, will stop flecainide. We discussed PVI if AF recurs off AAD. CHADSVASc 3 on eliquis.       Update (05/09/2023):    Holter 4/17/2023:  Baseline rhythm was sinus bradycardia with first degree AV block and an average heart rate of 55 bpm.  There were no reported symptoms.  There were very rare PVCs with an overall PVC burden of 0.1%. There were frequent PACs with an  overall PAC burden of 1.4%.  There were asymptomatic pauses of up to 2.1 seconds in duration during sleep with no evidence of high-degree AV block.  There were no atrial or ventricular arrhythmias.    Today he says the day after he took of his monitor he passed out. Felt LH prior. Did not go to hospital. Did not hit head and felt well when he woke up. Chlorthalidone was stopped. LH has improved. No palpitations, CP, DOSS.     He is currently taking eliquis 5mg BID for stroke prophylaxis and denies significant bleeding episodes. He is currently being treated with flecainide 50mg BID for rhythm control and carvedilol 25mg BID for HR control.  Kidney function is low, with a creatinine of 1.9 on 3/1/2023.    I have personally reviewed the patient's EKG today, which shows sinus rhythm with 1st deg AVB at 62bpm. ME interval is 268. QRS is 122. QTc is 444. Inverted T waves not previously seen.    Relevant Cardiac Test Results:    2D Echo (7/2022):  The left ventricle is normal in size with eccentric hypertrophy and normal systolic function. The estimated ejection fraction is 65%.  Normal right ventricular size with normal right ventricular systolic function.  Left ventricular diastolic dysfunction.  Biatrial enlargement.  Mild aortic regurgitation.  PA systolic pressure is at elast 39 mmHg. The IVC is not visualized.    Current Outpatient Medications   Medication Sig    apixaban (ELIQUIS) 5 mg Tab Take 1 tablet (5 mg total) by mouth 2 (two) times daily.    aspirin 81 MG Chew Take 81 mg by mouth once daily.    atorvastatin (LIPITOR) 40 MG tablet Take 1 tablet (40 mg total) by mouth once daily.    blood-glucose sensor Kayla Gin 3, use as directed, change every 14 days , e 11.65    carvediloL (COREG) 25 MG tablet Take 1 tablet (25 mg total) by mouth 2 (two) times daily.    chlorthalidone (HYGROTEN) 25 MG Tab Take 2 tablets (50 mg total) by mouth once daily.    empagliflozin (JARDIANCE) 10 mg tablet Take 1 tablet (10 mg total)  "by mouth once daily.    famotidine (PEPCID) 20 MG tablet Take 1 tablet (20 mg total) by mouth every evening.    ferrous sulfate (SLOW FE) 142 mg (45 mg iron) TbSR Take 1 tablet (142 mg total) by mouth Daily.    flecainide (TAMBOCOR) 100 MG Tab Take 1/2 tablet (50mg) twice daily    gabapentin (NEURONTIN) 300 MG capsule Take 1 capsule by mouth in the morning, 1 capsule midday, and 3 capsules at night.    hydrALAZINE (APRESOLINE) 25 MG tablet Take 1 tablet (25 mg total) by mouth every 12 (twelve) hours.    insulin (LANTUS SOLOSTAR U-100 INSULIN) glargine 100 units/mL SubQ pen Inject 33-35 units subcutaneously at night    insulin aspart U-100 (NOVOLOG) 100 unit/mL (3 mL) InPn pen Inject 18 units w/ breakfast, 15 units w/ lunch and dinner plus scale 150-200 +2, 201-250 +4, 251-300 +6, 301-350 +8. Max 78 units daily    meclizine (ANTIVERT) 25 mg tablet Take 1 tablet (25 mg total) by mouth 3 (three) times daily as needed for Dizziness.    melatonin 5 mg Tab Take 1 tablet (5 mg total) by mouth every evening.    mycophenolate (CELLCEPT) 250 mg Cap Take 4 capsules (1,000 mg total) by mouth 2 (two) times daily.    pen needle, diabetic (BD ULTRA-FINE LO PEN NEEDLE) 32 gauge x 5/32" Ndle USE TO ADMINISTER INSULIN 4 TIMES DAILY.    predniSONE (DELTASONE) 5 MG tablet Take 1 tablet (5 mg total) by mouth once daily.    spironolactone (ALDACTONE) 25 MG tablet Take 1 tablet (25 mg total) by mouth once daily.    tacrolimus (PROGRAF) 0.5 MG Cap Take 1 capsule (0.5 mg total) by mouth every 12 (twelve) hours.    valsartan (DIOVAN) 320 MG tablet Take 1 tablet (320 mg total) by mouth once daily.     No current facility-administered medications for this visit.     Review of Systems   Constitutional: Negative for malaise/fatigue.   Cardiovascular:  Positive for syncope. Negative for chest pain, dyspnea on exertion, irregular heartbeat, leg swelling and palpitations.   Respiratory:  Negative for shortness of breath.    Hematologic/Lymphatic: " "Negative for bleeding problem.   Skin:  Negative for rash.   Musculoskeletal:  Negative for myalgias.   Gastrointestinal:  Negative for hematemesis, hematochezia and nausea.   Genitourinary:  Negative for hematuria.   Neurological:  Negative for light-headedness.   Psychiatric/Behavioral:  Negative for altered mental status.    Allergic/Immunologic: Negative for persistent infections.     Objective:          BP (!) 145/65   Pulse 62   Ht 6' 1" (1.854 m)   Wt 91.7 kg (202 lb 2.6 oz)   BMI 26.67 kg/m²     Physical Exam  Vitals and nursing note reviewed.   Constitutional:       Appearance: Normal appearance. He is well-developed.   HENT:      Head: Normocephalic.      Nose: Nose normal.   Eyes:      Pupils: Pupils are equal, round, and reactive to light.   Cardiovascular:      Rate and Rhythm: Normal rate and regular rhythm.   Pulmonary:      Effort: No respiratory distress.      Breath sounds: Normal breath sounds.   Musculoskeletal:         General: Normal range of motion.   Skin:     General: Skin is warm and dry.      Findings: No erythema.   Neurological:      Mental Status: He is alert and oriented to person, place, and time.   Psychiatric:         Speech: Speech normal.         Behavior: Behavior normal.         Lab Results   Component Value Date     03/01/2023     03/01/2023    K 4.3 03/01/2023    K 4.3 03/01/2023    MG 1.7 11/11/2022    BUN 24 (H) 03/01/2023    BUN 24 (H) 03/01/2023    CREATININE 1.9 (H) 03/01/2023    CREATININE 1.9 (H) 03/01/2023    ALT <5 (L) 03/01/2023    AST 11 03/01/2023    HGB 10.0 (L) 11/11/2022    HCT 32.8 (L) 11/11/2022    HCT 31 (L) 11/05/2016    TSH 1.143 03/15/2019    LDLCALC 71.4 03/01/2023             Assessment:     1. Persistent atrial fibrillation    2. Primary hypertension    3. Chronic diastolic congestive heart failure    4. Anticoagulant long-term use    5. Encounter for monitoring flecainide therapy      Plan:     In summary, Mr. Zamorano is a 67 y.o. male " with DM, kidney tx (2016), HFpEF, AF here for follow up.   He is maintaining sinus rhythm on low dose flecainide. ECG intervals are narrower and back to baseline. Inverted T waves are new. Update ischemic eval.  He has a syncopal episode with LH prior - likely due to hypotension. LH has improved off chlorthalidone. Holter showed no AVB and normal HR curve. We did discuss possible ILR if he has another syncopal episode.  CHADSVASc 3 on eliquis.    SPECT  Continue current meds  RTC 6 mo, sooner if needed    *A copy of this note has been sent to Dr. Bowden*    Follow up in about 6 months (around 11/9/2023).    ------------------------------------------------------------------    REYES Flynn, NP-C  Cardiac Electrophysiology

## 2023-05-09 ENCOUNTER — OFFICE VISIT (OUTPATIENT)
Dept: ELECTROPHYSIOLOGY | Facility: CLINIC | Age: 68
End: 2023-05-09
Payer: MEDICARE

## 2023-05-09 ENCOUNTER — HOSPITAL ENCOUNTER (OUTPATIENT)
Dept: CARDIOLOGY | Facility: CLINIC | Age: 68
Discharge: HOME | End: 2023-05-09
Payer: MEDICARE

## 2023-05-09 VITALS
HEART RATE: 62 BPM | DIASTOLIC BLOOD PRESSURE: 65 MMHG | BODY MASS INDEX: 26.8 KG/M2 | WEIGHT: 202.19 LBS | HEIGHT: 73 IN | SYSTOLIC BLOOD PRESSURE: 145 MMHG

## 2023-05-09 DIAGNOSIS — I50.32 CHRONIC DIASTOLIC CONGESTIVE HEART FAILURE: ICD-10-CM

## 2023-05-09 DIAGNOSIS — Z51.81 ENCOUNTER FOR MONITORING FLECAINIDE THERAPY: ICD-10-CM

## 2023-05-09 DIAGNOSIS — I48.19 PERSISTENT ATRIAL FIBRILLATION: Primary | ICD-10-CM

## 2023-05-09 DIAGNOSIS — Z79.01 ANTICOAGULANT LONG-TERM USE: ICD-10-CM

## 2023-05-09 DIAGNOSIS — Z79.899 ENCOUNTER FOR MONITORING FLECAINIDE THERAPY: ICD-10-CM

## 2023-05-09 DIAGNOSIS — I10 PRIMARY HYPERTENSION: ICD-10-CM

## 2023-05-09 DIAGNOSIS — I49.8 OTHER CARDIAC ARRHYTHMIA: ICD-10-CM

## 2023-05-09 PROCEDURE — 3288F FALL RISK ASSESSMENT DOCD: CPT | Mod: CPTII,S$GLB,, | Performed by: NURSE PRACTITIONER

## 2023-05-09 PROCEDURE — 93005 ELECTROCARDIOGRAM TRACING: CPT | Mod: S$GLB,,, | Performed by: INTERNAL MEDICINE

## 2023-05-09 PROCEDURE — 3066F PR DOCUMENTATION OF TREATMENT FOR NEPHROPATHY: ICD-10-PCS | Mod: CPTII,S$GLB,, | Performed by: NURSE PRACTITIONER

## 2023-05-09 PROCEDURE — 99999 PR PBB SHADOW E&M-EST. PATIENT-LVL IV: CPT | Mod: PBBFAC,,, | Performed by: NURSE PRACTITIONER

## 2023-05-09 PROCEDURE — 4010F ACE/ARB THERAPY RXD/TAKEN: CPT | Mod: CPTII,S$GLB,, | Performed by: NURSE PRACTITIONER

## 2023-05-09 PROCEDURE — 99214 PR OFFICE/OUTPT VISIT, EST, LEVL IV, 30-39 MIN: ICD-10-PCS | Mod: S$GLB,,, | Performed by: NURSE PRACTITIONER

## 2023-05-09 PROCEDURE — 3008F BODY MASS INDEX DOCD: CPT | Mod: CPTII,S$GLB,, | Performed by: NURSE PRACTITIONER

## 2023-05-09 PROCEDURE — 1160F PR REVIEW ALL MEDS BY PRESCRIBER/CLIN PHARMACIST DOCUMENTED: ICD-10-PCS | Mod: CPTII,S$GLB,, | Performed by: NURSE PRACTITIONER

## 2023-05-09 PROCEDURE — 3077F SYST BP >= 140 MM HG: CPT | Mod: CPTII,S$GLB,, | Performed by: NURSE PRACTITIONER

## 2023-05-09 PROCEDURE — 93010 RHYTHM STRIP: ICD-10-PCS | Mod: S$GLB,,, | Performed by: INTERNAL MEDICINE

## 2023-05-09 PROCEDURE — 93010 ELECTROCARDIOGRAM REPORT: CPT | Mod: S$GLB,,, | Performed by: INTERNAL MEDICINE

## 2023-05-09 PROCEDURE — 3072F PR LOW RISK FOR RETINOPATHY: ICD-10-PCS | Mod: CPTII,S$GLB,, | Performed by: NURSE PRACTITIONER

## 2023-05-09 PROCEDURE — 1159F MED LIST DOCD IN RCRD: CPT | Mod: CPTII,S$GLB,, | Performed by: NURSE PRACTITIONER

## 2023-05-09 PROCEDURE — 3051F PR MOST RECENT HEMOGLOBIN A1C LEVEL 7.0 - < 8.0%: ICD-10-PCS | Mod: CPTII,S$GLB,, | Performed by: NURSE PRACTITIONER

## 2023-05-09 PROCEDURE — 1101F PR PT FALLS ASSESS DOC 0-1 FALLS W/OUT INJ PAST YR: ICD-10-PCS | Mod: CPTII,S$GLB,, | Performed by: NURSE PRACTITIONER

## 2023-05-09 PROCEDURE — 3008F PR BODY MASS INDEX (BMI) DOCUMENTED: ICD-10-PCS | Mod: CPTII,S$GLB,, | Performed by: NURSE PRACTITIONER

## 2023-05-09 PROCEDURE — 99214 OFFICE O/P EST MOD 30 MIN: CPT | Mod: S$GLB,,, | Performed by: NURSE PRACTITIONER

## 2023-05-09 PROCEDURE — 4010F PR ACE/ARB THEARPY RXD/TAKEN: ICD-10-PCS | Mod: CPTII,S$GLB,, | Performed by: NURSE PRACTITIONER

## 2023-05-09 PROCEDURE — 1126F PR PAIN SEVERITY QUANTIFIED, NO PAIN PRESENT: ICD-10-PCS | Mod: CPTII,S$GLB,, | Performed by: NURSE PRACTITIONER

## 2023-05-09 PROCEDURE — 3077F PR MOST RECENT SYSTOLIC BLOOD PRESSURE >= 140 MM HG: ICD-10-PCS | Mod: CPTII,S$GLB,, | Performed by: NURSE PRACTITIONER

## 2023-05-09 PROCEDURE — 3078F PR MOST RECENT DIASTOLIC BLOOD PRESSURE < 80 MM HG: ICD-10-PCS | Mod: CPTII,S$GLB,, | Performed by: NURSE PRACTITIONER

## 2023-05-09 PROCEDURE — 3072F LOW RISK FOR RETINOPATHY: CPT | Mod: CPTII,S$GLB,, | Performed by: NURSE PRACTITIONER

## 2023-05-09 PROCEDURE — 3062F POS MACROALBUMINURIA REV: CPT | Mod: CPTII,S$GLB,, | Performed by: NURSE PRACTITIONER

## 2023-05-09 PROCEDURE — 3062F PR POS MACROALBUMINURIA RESULT DOCUMENTED/REVIEW: ICD-10-PCS | Mod: CPTII,S$GLB,, | Performed by: NURSE PRACTITIONER

## 2023-05-09 PROCEDURE — 93005 RHYTHM STRIP: ICD-10-PCS | Mod: S$GLB,,, | Performed by: INTERNAL MEDICINE

## 2023-05-09 PROCEDURE — 1126F AMNT PAIN NOTED NONE PRSNT: CPT | Mod: CPTII,S$GLB,, | Performed by: NURSE PRACTITIONER

## 2023-05-09 PROCEDURE — 3066F NEPHROPATHY DOC TX: CPT | Mod: CPTII,S$GLB,, | Performed by: NURSE PRACTITIONER

## 2023-05-09 PROCEDURE — 99999 PR PBB SHADOW E&M-EST. PATIENT-LVL IV: ICD-10-PCS | Mod: PBBFAC,,, | Performed by: NURSE PRACTITIONER

## 2023-05-09 PROCEDURE — 3078F DIAST BP <80 MM HG: CPT | Mod: CPTII,S$GLB,, | Performed by: NURSE PRACTITIONER

## 2023-05-09 PROCEDURE — 1159F PR MEDICATION LIST DOCUMENTED IN MEDICAL RECORD: ICD-10-PCS | Mod: CPTII,S$GLB,, | Performed by: NURSE PRACTITIONER

## 2023-05-09 PROCEDURE — 1160F RVW MEDS BY RX/DR IN RCRD: CPT | Mod: CPTII,S$GLB,, | Performed by: NURSE PRACTITIONER

## 2023-05-09 PROCEDURE — 3051F HG A1C>EQUAL 7.0%<8.0%: CPT | Mod: CPTII,S$GLB,, | Performed by: NURSE PRACTITIONER

## 2023-05-09 PROCEDURE — 1101F PT FALLS ASSESS-DOCD LE1/YR: CPT | Mod: CPTII,S$GLB,, | Performed by: NURSE PRACTITIONER

## 2023-05-09 PROCEDURE — 3288F PR FALLS RISK ASSESSMENT DOCUMENTED: ICD-10-PCS | Mod: CPTII,S$GLB,, | Performed by: NURSE PRACTITIONER

## 2023-05-11 ENCOUNTER — TELEPHONE (OUTPATIENT)
Dept: CARDIOLOGY | Facility: HOSPITAL | Age: 68
End: 2023-05-11
Payer: MEDICARE

## 2023-05-15 ENCOUNTER — HOSPITAL ENCOUNTER (OUTPATIENT)
Dept: CARDIOLOGY | Facility: HOSPITAL | Age: 68
Discharge: HOME | End: 2023-05-15
Attending: NURSE PRACTITIONER
Payer: MEDICARE

## 2023-05-15 DIAGNOSIS — I48.19 PERSISTENT ATRIAL FIBRILLATION: ICD-10-CM

## 2023-05-15 DIAGNOSIS — I50.32 CHRONIC DIASTOLIC CONGESTIVE HEART FAILURE: ICD-10-CM

## 2023-05-15 LAB
CV PHARM DOSE: 0.4 MG
CV STRESS BASE HR: 50 BPM
DIASTOLIC BLOOD PRESSURE: 84 MMHG
EJECTION FRACTION- HIGH: 59 %
END DIASTOLIC INDEX-HIGH: 155 ML/M2
END DIASTOLIC INDEX-LOW: 91 ML/M2
END SYSTOLIC INDEX-HIGH: 78 ML/M2
END SYSTOLIC INDEX-LOW: 40 ML/M2
OHS CV CPX 1 MINUTE RECOVERY HEART RATE: 62 BPM
OHS CV CPX 85 PERCENT MAX PREDICTED HEART RATE MALE: 132
OHS CV CPX MAX PREDICTED HEART RATE: 155
OHS CV CPX PATIENT IS FEMALE: 0
OHS CV CPX PATIENT IS MALE: 1
OHS CV CPX PEAK DIASTOLIC BLOOD PRESSURE: 73 MMHG
OHS CV CPX PEAK HEAR RATE: 50 BPM
OHS CV CPX PEAK RATE PRESSURE PRODUCT: NORMAL
OHS CV CPX PEAK SYSTOLIC BLOOD PRESSURE: 206 MMHG
OHS CV CPX PERCENT MAX PREDICTED HEART RATE ACHIEVED: 32
OHS CV CPX RATE PRESSURE PRODUCT PRESENTING: NORMAL
RETIRED EF AND QEF - SEE NOTES: 47 %
SYSTOLIC BLOOD PRESSURE: 208 MMHG

## 2023-05-15 PROCEDURE — 93018 NUCLEAR STRESS - CARDIOLOGY INTERPRETED (CUPID ONLY): ICD-10-PCS | Mod: ,,, | Performed by: INTERNAL MEDICINE

## 2023-05-15 PROCEDURE — 93016 NUCLEAR STRESS - CARDIOLOGY INTERPRETED (CUPID ONLY): ICD-10-PCS | Mod: ,,, | Performed by: INTERNAL MEDICINE

## 2023-05-15 PROCEDURE — A9502 TC99M TETROFOSMIN: HCPCS

## 2023-05-15 PROCEDURE — 78452 HT MUSCLE IMAGE SPECT MULT: CPT

## 2023-05-15 PROCEDURE — 93016 CV STRESS TEST SUPVJ ONLY: CPT | Mod: ,,, | Performed by: INTERNAL MEDICINE

## 2023-05-15 PROCEDURE — 93018 CV STRESS TEST I&R ONLY: CPT | Mod: ,,, | Performed by: INTERNAL MEDICINE

## 2023-05-15 PROCEDURE — 78452 NUCLEAR STRESS - CARDIOLOGY INTERPRETED (CUPID ONLY): ICD-10-PCS | Mod: 26,,, | Performed by: INTERNAL MEDICINE

## 2023-05-15 PROCEDURE — 78452 HT MUSCLE IMAGE SPECT MULT: CPT | Mod: 26,,, | Performed by: INTERNAL MEDICINE

## 2023-05-15 PROCEDURE — 63600175 PHARM REV CODE 636 W HCPCS: Performed by: NURSE PRACTITIONER

## 2023-05-15 RX ORDER — REGADENOSON 0.08 MG/ML
0.4 INJECTION, SOLUTION INTRAVENOUS
Status: COMPLETED | OUTPATIENT
Start: 2023-05-15 | End: 2023-05-15

## 2023-05-15 RX ADMIN — REGADENOSON 0.4 MG: 0.08 INJECTION, SOLUTION INTRAVENOUS at 08:05

## 2023-05-16 ENCOUNTER — TELEPHONE (OUTPATIENT)
Dept: ELECTROPHYSIOLOGY | Facility: CLINIC | Age: 68
End: 2023-05-16
Payer: MEDICARE

## 2023-05-16 NOTE — TELEPHONE ENCOUNTER
Contacted pt and advised of Nuclear stress results. Pt voiced understanding.        ----- Message from Mima Lyons NP sent at 5/16/2023  7:30 AM CDT -----  Please let patient know his stress test looked good - showed no signs of blockages.

## 2023-05-17 ENCOUNTER — OFFICE VISIT (OUTPATIENT)
Dept: INTERNAL MEDICINE | Facility: CLINIC | Age: 68
End: 2023-05-17
Payer: MEDICARE

## 2023-05-17 VITALS
BODY MASS INDEX: 26.52 KG/M2 | DIASTOLIC BLOOD PRESSURE: 60 MMHG | SYSTOLIC BLOOD PRESSURE: 128 MMHG | HEIGHT: 73 IN | HEART RATE: 68 BPM | WEIGHT: 200.06 LBS | OXYGEN SATURATION: 95 %

## 2023-05-17 DIAGNOSIS — Z94.0 S/P KIDNEY TRANSPLANT: ICD-10-CM

## 2023-05-17 DIAGNOSIS — Z23 NEED FOR PNEUMOCOCCAL 20-VALENT CONJUGATE VACCINATION: ICD-10-CM

## 2023-05-17 DIAGNOSIS — E11.22 TYPE 2 DIABETES MELLITUS WITH STAGE 3A CHRONIC KIDNEY DISEASE, WITH LONG-TERM CURRENT USE OF INSULIN: Primary | ICD-10-CM

## 2023-05-17 DIAGNOSIS — E78.2 MIXED HYPERLIPIDEMIA: ICD-10-CM

## 2023-05-17 DIAGNOSIS — N18.31 TYPE 2 DIABETES MELLITUS WITH STAGE 3A CHRONIC KIDNEY DISEASE, WITH LONG-TERM CURRENT USE OF INSULIN: Primary | ICD-10-CM

## 2023-05-17 DIAGNOSIS — I10 HYPERTENSION, ESSENTIAL: ICD-10-CM

## 2023-05-17 DIAGNOSIS — E11.3393 TYPE 2 DIABETES MELLITUS WITH BOTH EYES AFFECTED BY MODERATE NONPROLIFERATIVE RETINOPATHY WITHOUT MACULAR EDEMA, WITHOUT LONG-TERM CURRENT USE OF INSULIN: ICD-10-CM

## 2023-05-17 DIAGNOSIS — Z79.4 TYPE 2 DIABETES MELLITUS WITH STAGE 3A CHRONIC KIDNEY DISEASE, WITH LONG-TERM CURRENT USE OF INSULIN: Primary | ICD-10-CM

## 2023-05-17 PROCEDURE — 3078F PR MOST RECENT DIASTOLIC BLOOD PRESSURE < 80 MM HG: ICD-10-PCS | Mod: CPTII,,, | Performed by: PHYSICIAN ASSISTANT

## 2023-05-17 PROCEDURE — 3074F PR MOST RECENT SYSTOLIC BLOOD PRESSURE < 130 MM HG: ICD-10-PCS | Mod: CPTII,,, | Performed by: PHYSICIAN ASSISTANT

## 2023-05-17 PROCEDURE — 3066F NEPHROPATHY DOC TX: CPT | Mod: CPTII,,, | Performed by: PHYSICIAN ASSISTANT

## 2023-05-17 PROCEDURE — 1126F PR PAIN SEVERITY QUANTIFIED, NO PAIN PRESENT: ICD-10-PCS | Mod: CPTII,,, | Performed by: PHYSICIAN ASSISTANT

## 2023-05-17 PROCEDURE — 3008F PR BODY MASS INDEX (BMI) DOCUMENTED: ICD-10-PCS | Mod: CPTII,,, | Performed by: PHYSICIAN ASSISTANT

## 2023-05-17 PROCEDURE — 90677 PNEUMOCOCCAL CONJUGATE VACCINE 20-VALENT: ICD-10-PCS | Mod: ,,, | Performed by: PHYSICIAN ASSISTANT

## 2023-05-17 PROCEDURE — 3288F PR FALLS RISK ASSESSMENT DOCUMENTED: ICD-10-PCS | Mod: CPTII,,, | Performed by: PHYSICIAN ASSISTANT

## 2023-05-17 PROCEDURE — 1126F AMNT PAIN NOTED NONE PRSNT: CPT | Mod: CPTII,,, | Performed by: PHYSICIAN ASSISTANT

## 2023-05-17 PROCEDURE — 99999 PR PBB SHADOW E&M-EST. PATIENT-LVL V: CPT | Mod: PBBFAC,,, | Performed by: PHYSICIAN ASSISTANT

## 2023-05-17 PROCEDURE — 3008F BODY MASS INDEX DOCD: CPT | Mod: CPTII,,, | Performed by: PHYSICIAN ASSISTANT

## 2023-05-17 PROCEDURE — 1159F PR MEDICATION LIST DOCUMENTED IN MEDICAL RECORD: ICD-10-PCS | Mod: CPTII,,, | Performed by: PHYSICIAN ASSISTANT

## 2023-05-17 PROCEDURE — 3072F PR LOW RISK FOR RETINOPATHY: ICD-10-PCS | Mod: CPTII,,, | Performed by: PHYSICIAN ASSISTANT

## 2023-05-17 PROCEDURE — 1101F PR PT FALLS ASSESS DOC 0-1 FALLS W/OUT INJ PAST YR: ICD-10-PCS | Mod: CPTII,,, | Performed by: PHYSICIAN ASSISTANT

## 2023-05-17 PROCEDURE — 1160F PR REVIEW ALL MEDS BY PRESCRIBER/CLIN PHARMACIST DOCUMENTED: ICD-10-PCS | Mod: CPTII,,, | Performed by: PHYSICIAN ASSISTANT

## 2023-05-17 PROCEDURE — G0009 ADMIN PNEUMOCOCCAL VACCINE: HCPCS | Mod: ,,, | Performed by: PHYSICIAN ASSISTANT

## 2023-05-17 PROCEDURE — 1101F PT FALLS ASSESS-DOCD LE1/YR: CPT | Mod: CPTII,,, | Performed by: PHYSICIAN ASSISTANT

## 2023-05-17 PROCEDURE — 90677 PCV20 VACCINE IM: CPT | Mod: ,,, | Performed by: PHYSICIAN ASSISTANT

## 2023-05-17 PROCEDURE — G0009 PNEUMOCOCCAL CONJUGATE VACCINE 20-VALENT: ICD-10-PCS | Mod: ,,, | Performed by: PHYSICIAN ASSISTANT

## 2023-05-17 PROCEDURE — 4010F PR ACE/ARB THEARPY RXD/TAKEN: ICD-10-PCS | Mod: CPTII,,, | Performed by: PHYSICIAN ASSISTANT

## 2023-05-17 PROCEDURE — 99214 OFFICE O/P EST MOD 30 MIN: CPT | Mod: ,,, | Performed by: PHYSICIAN ASSISTANT

## 2023-05-17 PROCEDURE — 3078F DIAST BP <80 MM HG: CPT | Mod: CPTII,,, | Performed by: PHYSICIAN ASSISTANT

## 2023-05-17 PROCEDURE — 3062F PR POS MACROALBUMINURIA RESULT DOCUMENTED/REVIEW: ICD-10-PCS | Mod: CPTII,,, | Performed by: PHYSICIAN ASSISTANT

## 2023-05-17 PROCEDURE — 1160F RVW MEDS BY RX/DR IN RCRD: CPT | Mod: CPTII,,, | Performed by: PHYSICIAN ASSISTANT

## 2023-05-17 PROCEDURE — 3072F LOW RISK FOR RETINOPATHY: CPT | Mod: CPTII,,, | Performed by: PHYSICIAN ASSISTANT

## 2023-05-17 PROCEDURE — 1159F MED LIST DOCD IN RCRD: CPT | Mod: CPTII,,, | Performed by: PHYSICIAN ASSISTANT

## 2023-05-17 PROCEDURE — 99214 PR OFFICE/OUTPT VISIT, EST, LEVL IV, 30-39 MIN: ICD-10-PCS | Mod: ,,, | Performed by: PHYSICIAN ASSISTANT

## 2023-05-17 PROCEDURE — 3051F PR MOST RECENT HEMOGLOBIN A1C LEVEL 7.0 - < 8.0%: ICD-10-PCS | Mod: CPTII,,, | Performed by: PHYSICIAN ASSISTANT

## 2023-05-17 PROCEDURE — 99999 PR PBB SHADOW E&M-EST. PATIENT-LVL V: ICD-10-PCS | Mod: PBBFAC,,, | Performed by: PHYSICIAN ASSISTANT

## 2023-05-17 PROCEDURE — 3062F POS MACROALBUMINURIA REV: CPT | Mod: CPTII,,, | Performed by: PHYSICIAN ASSISTANT

## 2023-05-17 PROCEDURE — 3051F HG A1C>EQUAL 7.0%<8.0%: CPT | Mod: CPTII,,, | Performed by: PHYSICIAN ASSISTANT

## 2023-05-17 PROCEDURE — 3288F FALL RISK ASSESSMENT DOCD: CPT | Mod: CPTII,,, | Performed by: PHYSICIAN ASSISTANT

## 2023-05-17 PROCEDURE — 3066F PR DOCUMENTATION OF TREATMENT FOR NEPHROPATHY: ICD-10-PCS | Mod: CPTII,,, | Performed by: PHYSICIAN ASSISTANT

## 2023-05-17 PROCEDURE — 4010F ACE/ARB THERAPY RXD/TAKEN: CPT | Mod: CPTII,,, | Performed by: PHYSICIAN ASSISTANT

## 2023-05-17 PROCEDURE — 3074F SYST BP LT 130 MM HG: CPT | Mod: CPTII,,, | Performed by: PHYSICIAN ASSISTANT

## 2023-05-17 NOTE — PROGRESS NOTES
Subjective:       Patient ID: Ravi Zamorano is a 67 y.o. male.        Chief Complaint: blood pressure follow up    Ravi Zamorano is an established patient of Mima Mack MD here today for follow up visit.    HTN -   Valsartan 320 mg daily   Coreg 25 mg BID  Spironolactone 25 mg daily    Hydralazine 25 mg BID prn elevated BP, has not used in months    Chlorthalidone d/c 4/27/23 due to symptomatic hypotension, feeling much better since d/c  Nifedipine --> gingival hyperplasia     Brings in BP logs with elevated BP readings despite normal reading in clinic today, had cuff checked previously but unsure of accuracy given discordance with in office reading, will try using his wife's cuff     Anxiety - really bad while driving or riding in the car, long standing, his wife has offered to drive him     DM -   Lantus 33-35 U  Novolog 15-18 U with meals  Karan Lopez NP follows  CGM right arm    Lab Results       Component                Value               Date                       HGBA1C                   7.2 (H)             03/01/2023                 HGBA1C                   7.3 (H)             11/11/2022                 HGBA1C                   7.8 (H)             07/11/2022               S/p kidney transplant 11/5/16 secondary to ESRD from HTN and DM     Afib s/p cardioverson 8/2019 -   Followed by JENNIFER  Eliquis 5 mg BID  Flecainide 100 mg 1/2 tablet BID     CKD          Review of Systems   Constitutional:  Negative for appetite change, chills, fatigue and fever.   HENT:  Negative for congestion and sore throat.    Eyes:  Negative for visual disturbance.   Respiratory:  Negative for cough, chest tightness and shortness of breath.    Cardiovascular:  Negative for chest pain, palpitations and leg swelling.   Gastrointestinal:  Negative for abdominal pain, blood in stool, constipation, diarrhea, nausea and vomiting.   Genitourinary:  Negative for dysuria, frequency, hematuria and urgency.   Musculoskeletal:   Negative for arthralgias and back pain.   Skin:  Negative for rash.   Neurological:  Negative for dizziness, syncope, weakness and headaches.   Psychiatric/Behavioral:  Negative for dysphoric mood and sleep disturbance. The patient is not nervous/anxious.      Objective:      Physical Exam  Vitals and nursing note reviewed.   Constitutional:       Appearance: He is well-developed.   HENT:      Head: Normocephalic.      Right Ear: External ear normal.      Left Ear: External ear normal.   Eyes:      Pupils: Pupils are equal, round, and reactive to light.   Cardiovascular:      Rate and Rhythm: Normal rate and regular rhythm.      Heart sounds: Normal heart sounds. No murmur heard.    No friction rub. No gallop.   Pulmonary:      Effort: Pulmonary effort is normal. No respiratory distress.      Breath sounds: Normal breath sounds.   Abdominal:      Palpations: Abdomen is soft.      Tenderness: There is no abdominal tenderness.   Musculoskeletal:         General: No swelling.   Skin:     General: Skin is warm and dry.   Neurological:      General: No focal deficit present.      Mental Status: He is alert.   Psychiatric:         Mood and Affect: Mood normal.       Assessment:       1. Type 2 diabetes mellitus with stage 3a chronic kidney disease, with long-term current use of insulin    2. Type 2 diabetes mellitus with both eyes affected by moderate nonproliferative retinopathy without macular edema, without long-term current use of insulin    3. Need for pneumococcal 20-valent conjugate vaccination    4. Hypertension, essential    5. Mixed hyperlipidemia    6. S/P cadaveric kidney transplant 11/5/2016. ESRD secondary to HTN/DMII        Plan:       Ravi was seen today for blood pressure follow up.    Diagnoses and all orders for this visit:    Type 2 diabetes mellitus with stage 3a chronic kidney disease, with long-term current use of insulin  -     Ambulatory referral/consult to Optometry; Future    Type 2 diabetes  "mellitus with both eyes affected by moderate nonproliferative retinopathy without macular edema, without long-term current use of insulin  -     Ambulatory referral/consult to Optometry; Future    Need for pneumococcal 20-valent conjugate vaccination  -     (In Office Administered) Pneumococcal Conjugate Vaccine (20 Valent) (IM)    Hypertension, essential - stable and controlled, continue current regimen  BP Readings from Last 5 Encounters:   05/17/23 128/60   05/09/23 (!) 145/65   04/27/23 (!) 120/58   04/04/23 (!) 116/55   02/09/23 (!) 148/67      Mixed hyperlipidemia - stable and controlled, continue current regimen  Lab Results   Component Value Date    CHOL 132 03/01/2023    TRIG 138 03/01/2023    HDL 33 (L) 03/01/2023    LDLCALC 71.4 03/01/2023     S/P cadaveric kidney transplant 11/5/2016. ESRD secondary to HTN/DMII    Due for eye exam and prevnar 20  BP is completely fine today and prior sx resolved  Will try using his wife's BP cuff and compare to his home cuff - unsure of accuracy given elevated home readings with in office reading completely fine  Will let me know how the two compare    Pt has been given instructions populated from patient instructions database and has verbalized understanding of the after visit summary and information contained wherein.    Follow up with a primary care provider. May go to ER for acute shortness of breath, lightheadedness, fever, or any other emergent complaints or changes in condition.    "This note will be shared with the patient"    Future Appointments   Date Time Provider Department Center   6/1/2023 10:00 AM LAB, APPOINTMENT [facility]   6/7/2023 11:30 AM REYES Rai, FNP [facility] IM [facility]   9/26/2023 12:40 PM Mony Christensen, OD [facility]                 "

## 2023-05-31 DIAGNOSIS — I48.91 NEW ONSET A-FIB: ICD-10-CM

## 2023-05-31 DIAGNOSIS — R06.09 DOE (DYSPNEA ON EXERTION): ICD-10-CM

## 2023-05-31 NOTE — TELEPHONE ENCOUNTER
----- Message from Priya Calvin sent at 5/31/2023 10:18 AM CDT -----  Contact: Ms. Hunt [facility] Phone# [phone]  Ms. Hunt from [facility] would like a call back in regards to her wanting to know what is the status on the order that was placed on May twenty forth for the patients apixaban (ELIQUIS) 5 mg Tab?

## 2023-06-07 ENCOUNTER — LAB VISIT (OUTPATIENT)
Dept: LAB | Facility: HOSPITAL | Age: 68
End: 2023-06-07
Payer: MEDICARE

## 2023-06-07 DIAGNOSIS — E78.2 MIXED HYPERLIPIDEMIA: ICD-10-CM

## 2023-06-07 DIAGNOSIS — E11.42 DIABETIC POLYNEUROPATHY ASSOCIATED WITH TYPE 2 DIABETES MELLITUS: ICD-10-CM

## 2023-06-07 LAB
CHOLEST SERPL-MCNC: 131 MG/DL (ref 120–199)
CHOLEST/HDLC SERPL: 4.2 {RATIO} (ref 2–5)
ESTIMATED AVG GLUCOSE: 169 MG/DL (ref 68–131)
HBA1C MFR BLD: 7.5 % (ref 4–5.6)
HDLC SERPL-MCNC: 31 MG/DL (ref 40–75)
HDLC SERPL: 23.7 % (ref 20–50)
LDLC SERPL CALC-MCNC: 61.4 MG/DL (ref 63–159)
NONHDLC SERPL-MCNC: 100 MG/DL
TRIGL SERPL-MCNC: 193 MG/DL (ref 30–150)

## 2023-06-07 PROCEDURE — 83036 HEMOGLOBIN GLYCOSYLATED A1C: CPT | Performed by: NURSE PRACTITIONER

## 2023-06-07 PROCEDURE — 80061 LIPID PANEL: CPT | Performed by: NURSE PRACTITIONER

## 2023-06-07 PROCEDURE — 36415 COLL VENOUS BLD VENIPUNCTURE: CPT | Performed by: NURSE PRACTITIONER

## 2023-06-08 ENCOUNTER — TELEPHONE (OUTPATIENT)
Dept: INTERNAL MEDICINE | Facility: CLINIC | Age: 68
End: 2023-06-08
Payer: MEDICARE

## 2023-06-08 NOTE — TELEPHONE ENCOUNTER
----- Message from Khalida Buckley sent at 6/8/2023  2:23 PM CDT -----  Regarding: Pt called to reschedule his missed appt for type 2 diabetes that he forgot about on yesterday and pt would like a call back today  Appointment Access Request:    Pt called to reschedule his missed appt for type 2 diabetes that he forgot about on yesterday and pt would like a call back today    Pt can be reached at [phone]

## 2023-06-13 DIAGNOSIS — I10 ESSENTIAL HYPERTENSION: ICD-10-CM

## 2023-06-13 DIAGNOSIS — I48.91 NEW ONSET A-FIB: ICD-10-CM

## 2023-06-13 DIAGNOSIS — R06.09 DOE (DYSPNEA ON EXERTION): ICD-10-CM

## 2023-06-13 RX ORDER — APIXABAN 5 MG/1
5 TABLET, FILM COATED ORAL 2 TIMES DAILY
Qty: 60 TABLET | Refills: 11 | Status: CANCELLED | OUTPATIENT
Start: 2023-06-13

## 2023-06-13 RX ORDER — VALSARTAN 320 MG/1
320 TABLET ORAL DAILY
Qty: 90 TABLET | Refills: 3 | Status: ON HOLD | OUTPATIENT
Start: 2023-06-13 | End: 2024-03-13

## 2023-06-13 NOTE — TELEPHONE ENCOUNTER
No care due was identified.  [facility] Embedded Care Due Messages. Reference number: [id number].   6/13/2023 1:29:38 PM CDT

## 2023-07-14 ENCOUNTER — TELEPHONE (OUTPATIENT)
Dept: INTERNAL MEDICINE | Facility: CLINIC | Age: 68
End: 2023-07-14
Payer: MEDICARE

## 2023-07-14 NOTE — TELEPHONE ENCOUNTER
----- Message from Moni Tucker sent at 7/14/2023 12:46 PM CDT -----  Contact: [phone]  Pt called to speak to the provider and or nurse in reference to getting something for vertigo. Please Advise       [facility] #[id number] - [address] AT [address] [address]  Phone: [phone] Fax: [phone]

## 2023-07-17 DIAGNOSIS — R42 VERTIGO: ICD-10-CM

## 2023-07-17 NOTE — TELEPHONE ENCOUNTER
----- Message from Marlena Carty sent at 7/17/2023  3:36 PM CDT -----  Contact: [phone]  1MEDICALADVICE     Patient is calling for Medical Advice regarding:Vertigo    How long has patient had these symptoms:1 week     Pharmacy name and phone#:  [facility] #[id number] - [address] AT [address] [address]  Phone: [phone] Fax: [phone]     Would like response via [employer]:  call back     Comments:

## 2023-07-18 RX ORDER — MECLIZINE HYDROCHLORIDE 25 MG/1
25 TABLET ORAL 3 TIMES DAILY PRN
Qty: 21 TABLET | Refills: 0 | Status: SHIPPED | OUTPATIENT
Start: 2023-07-18 | End: 2023-07-19 | Stop reason: SDUPTHER

## 2023-07-18 NOTE — TELEPHONE ENCOUNTER
----- Message from Sarah Wu sent at 7/18/2023  9:32 AM CDT -----  Contact: [phone] @ patient  Good morning patient would like a call back about getting back on a med for his vertigo that he is no longer taking  but would like to start back taking. Please give patient a call back [phone]

## 2023-07-19 RX ORDER — MECLIZINE HYDROCHLORIDE 25 MG/1
25 TABLET ORAL 3 TIMES DAILY PRN
Qty: 21 TABLET | Refills: 0 | Status: SHIPPED | OUTPATIENT
Start: 2023-07-19 | End: 2024-03-14 | Stop reason: SDUPTHER

## 2023-07-19 NOTE — TELEPHONE ENCOUNTER
No care due was identified.  Health [facility] Embedded Care Due Messages. Reference number: [id number].   7/19/2023 10:04:20 AM CDT

## 2023-08-04 DIAGNOSIS — I50.32 CHRONIC DIASTOLIC CONGESTIVE HEART FAILURE: ICD-10-CM

## 2023-08-04 DIAGNOSIS — E11.42 DIABETIC POLYNEUROPATHY ASSOCIATED WITH TYPE 2 DIABETES MELLITUS: ICD-10-CM

## 2023-08-04 DIAGNOSIS — I15.0 RENOVASCULAR HYPERTENSION: ICD-10-CM

## 2023-08-04 RX ORDER — GABAPENTIN 300 MG/1
CAPSULE ORAL
Qty: 450 CAPSULE | Refills: 3 | Status: SHIPPED | OUTPATIENT
Start: 2023-08-04

## 2023-08-04 RX ORDER — SPIRONOLACTONE 25 MG/1
25 TABLET ORAL DAILY
Qty: 90 TABLET | Refills: 1 | Status: SHIPPED | OUTPATIENT
Start: 2023-08-04 | End: 2024-01-04 | Stop reason: SDUPTHER

## 2023-08-04 NOTE — TELEPHONE ENCOUNTER
Care Due:                  Date            Visit Type   Department     Provider  --------------------------------------------------------------------------------                                EP -                              PRIMARY      [facility] INTER[facility]  Last Visit: 10-      CARE ([facility])   MEDICINE       CARRIE BUSTILLOS  Next Visit: None Scheduled  None         None Found                                                            Last  Test          Frequency    Reason                     Performed    Due Date  --------------------------------------------------------------------------------    Office Visit  12 months..  valsartan................  10-   10-    Health [facility] Embedded Care Due Messages. Reference number: [id number].   8/04/2023 3:13:53 PM CDT

## 2023-08-11 NOTE — TELEPHONE ENCOUNTER
Please call pt. a1c was above goal of 7.5%. I recall he ran out of trulicity previously - let's repeat in 3 months. Work on diet and stay compliant with meds.   He has persistent anemia which is stable from previously - keep regular follow up with nephrologist who manages this. See me in 3 mo after labs.    Staged Advancement Flap Text: The defect edges were debeveled with a #15 scalpel blade.  Given the location of the defect, shape of the defect and the proximity to free margins a staged advancement flap was deemed most appropriate.  Using a sterile surgical marker, an appropriate advancement flap was drawn incorporating the defect and placing the expected incisions within the relaxed skin tension lines where possible. The area thus outlined was incised deep to adipose tissue with a #15 scalpel blade.  The skin margins were undermined to an appropriate distance in all directions utilizing iris scissors.

## 2023-09-05 RX ORDER — PEN NEEDLE, DIABETIC 30 GX3/16"
NEEDLE, DISPOSABLE MISCELLANEOUS
Qty: 100 EACH | Refills: 11 | Status: SHIPPED | OUTPATIENT
Start: 2023-09-05 | End: 2024-09-04

## 2023-10-04 RX ORDER — FLECAINIDE ACETATE 50 MG/1
50 TABLET ORAL 2 TIMES DAILY
Qty: 180 TABLET | Refills: 0 | Status: SHIPPED | OUTPATIENT
Start: 2023-10-04 | End: 2024-01-04 | Stop reason: SDUPTHER

## 2023-11-01 DIAGNOSIS — Z94.0 KIDNEY REPLACED BY TRANSPLANT: Primary | ICD-10-CM

## 2023-11-07 NOTE — PROGRESS NOTES
CHIEF COMPLAINT: Type 2 Diabetes     HPI: Mr. Ravi Zamorano is a 67 y.o. male who was diagnosed with Type 2 DM in 2000.  S/p kidney transplant 2016  Last seen by me in 2021  Audio visit in 2022.   Being seen by me again today.   Using maycol.   Having issues leg pain , walking long distances, pivoting off floor, feels like who loss muscle strength x 2 years.   Not able to do activities-yard work ,etc.   On stable prograf does, pred 5 mg daily   +PN                 Seen by me in the past via endocrinology.   Last seen by me in 12/2020.  Reports 3x bg 300s , missing dose (prandial)  132 mg/dl this am      Lab Results   Component Value Date    HGBA1C 7.5 (H) 06/07/2023       Past Medical History:   Diagnosis Date    Anxiety 7/29/2014    Arthritis     Bilateral diabetic retinopathy 2017    CKD (chronic kidney disease) stage 2, GFR 60-89 ml/min 12/28/2016    CKD (chronic kidney disease) stage 4, GFR 15-29 ml/min 7/29/2014    Colon polyps 2014    Depression - situational 7/29/2014    Diabetes mellitus     Diabetes type 2 since 2000 7/29/2014    Diabetic neuropathy 7/29/2014    History of cardioversion 10/3/2019    History of hepatitis C, s/p successful Rx w/ SVR24 - 9/2017 7/29/2014    Genotype 1a, treatment naive 10/2014 liver biopsy - grade 1 / stage 1 Completed Harvoni w/ SVR    Hyperlipidemia 7/29/2014    Hypertension     Neuropathy     Organ transplant candidate 7/29/2014    Pancreatitis     S/P cadaveric kidney transplant 11/5/2016. ESRD secondary to HTN/DMII 11/5/2016    Stage 3a chronic kidney disease 12/28/2016    Type 2 diabetes mellitus with stage 3a chronic kidney disease, with long-term current use of insulin 7/29/2014     Remains on MDI---lantus and humalog     PREVIOUS DIABETES MEDICATIONS TRIED  lantus  humalog  novolog  levemir  trulicity   tradjenta  Metformin   ozempic   Jardiance     CURRENT DIABETIC MEDS: lantus-off, prn, novolog 16-16-16 units ac w/ scale 150-200+2, etc., jardiance 10 mg daily      Lows in the evening 77 mg/dl  Holding lantus   On MDI (injections 4 x a day)   Makes frequent changes to his/her insulin regimen on the basis of blood glucose data  Testing 4 x a day max   Patient is willing and able to use the device  Demonstrated an understanding of the technology and is motivated to use CGM  Patient expected to adhere to a comprehensive diabetes treatment plan and patient has adequate medical supervision    Has multiple impaired awareness of hypoglycemia (hypoglycemia unawareness)  Needs work with dietary habits    Diabetes Management Status    Statin: Taking  ACE/ARB: Taking    Screening or Prevention Patient's value Goal Complete/Controlled?   HgA1C Testing and Control   Lab Results   Component Value Date    HGBA1C 7.5 (H) 06/07/2023      Annually/Less than 8% Yes   Lipid profile : 06/07/2023 Annually Yes   LDL control Lab Results   Component Value Date    LDLCALC 61.4 (L) 06/07/2023    Annually/Less than 100 mg/dl  Yes   Nephropathy screening Lab Results   Component Value Date    LABMICR 544.0 03/01/2023     Lab Results   Component Value Date    PROTEINUA 1+ (A) 11/12/2022    Annually Yes   Blood pressure BP Readings from Last 1 Encounters:   05/17/23 128/60    Less than 140/90 No   Dilated retinal exam : 04/07/2022 Annually Yes   Foot exam   : 12/02/2020 Annually No     REVIEW OF SYSTEMS  General: no weakness, fatigue, + weight changes 8#   Eyes: no double or blurred vision, eye pain, or redness  Cardiovascular: no chest pain, palpitations, + ankle edema, or murmurs.   Respiratory: no cough or dyspnea.   GI: no heartburn, nausea, or changes in bowel patterns; good appetite.   Skin: no rashes, dryness, itching, or reactions at insulin injection sites.  Neuro: + numbness, tingling-gabapentin,RLS, tremors, or vertigo. +loss of strength in legs  Endocrine: no polyuria, polydipsia, polyphagia, heat or cold intolerance.     Vital Signs  BP (!) 140/78 (BP Location: Left arm, Patient Position:  "Sitting, BP Method: Medium (Manual))   Pulse (!) 58   Ht 6' 1" (1.854 m)   Wt 93.1 kg (205 lb 4 oz)   SpO2 98%   BMI 27.08 kg/m²     Hemoglobin A1C   Date Value Ref Range Status   06/07/2023 7.5 (H) 4.0 - 5.6 % Final     Comment:     ADA Screening Guidelines:  5.7-6.4%  Consistent with prediabetes  >or=6.5%  Consistent with diabetes    High levels of fetal hemoglobin interfere with the HbA1C  assay. Heterozygous hemoglobin variants (HbS, HgC, etc)do  not significantly interfere with this assay.   However, presence of multiple variants may affect accuracy.     03/01/2023 7.2 (H) 4.0 - 5.6 % Final     Comment:     ADA Screening Guidelines:  5.7-6.4%  Consistent with prediabetes  >or=6.5%  Consistent with diabetes    High levels of fetal hemoglobin interfere with the HbA1C  assay. Heterozygous hemoglobin variants (HbS, HgC, etc)do  not significantly interfere with this assay.   However, presence of multiple variants may affect accuracy.     11/11/2022 7.3 (H) 4.0 - 5.6 % Final     Comment:     ADA Screening Guidelines:  5.7-6.4%  Consistent with prediabetes  >or=6.5%  Consistent with diabetes    High levels of fetal hemoglobin interfere with the HbA1C  assay. Heterozygous hemoglobin variants (HbS, HgC, etc)do  not significantly interfere with this assay.   However, presence of multiple variants may affect accuracy.          Chemistry        Component Value Date/Time     03/01/2023 0812     03/01/2023 0812    K 4.3 03/01/2023 0812    K 4.3 03/01/2023 0812     03/01/2023 0812     03/01/2023 0812    CO2 26 03/01/2023 0812    CO2 26 03/01/2023 0812    BUN 24 (H) 03/01/2023 0812    BUN 24 (H) 03/01/2023 0812    CREATININE 1.9 (H) 03/01/2023 0812    CREATININE 1.9 (H) 03/01/2023 0812     03/01/2023 0812     03/01/2023 0812        Component Value Date/Time    CALCIUM 9.5 03/01/2023 0812    CALCIUM 9.5 03/01/2023 0812    ALKPHOS 53 (L) 03/01/2023 0812    AST 11 03/01/2023 0812    ALT " <5 (L) 03/01/2023 0812    BILITOT 0.3 03/01/2023 0812    ESTGFRAFRICA 51.9 (A) 07/19/2022 0723    EGFRNONAA 44.9 (A) 07/19/2022 0723           Lab Results   Component Value Date    TSH 1.143 03/15/2019      Lab Results   Component Value Date    CHOL 131 06/07/2023    CHOL 132 03/01/2023    CHOL 130 06/22/2021     Lab Results   Component Value Date    HDL 31 (L) 06/07/2023    HDL 33 (L) 03/01/2023    HDL 30 (L) 06/22/2021     Lab Results   Component Value Date    LDLCALC 61.4 (L) 06/07/2023    LDLCALC 71.4 03/01/2023    LDLCALC 45.6 (L) 06/22/2021     Lab Results   Component Value Date    TRIG 193 (H) 06/07/2023    TRIG 138 03/01/2023    TRIG 272 (H) 06/22/2021     Lab Results   Component Value Date    CHOLHDL 23.7 06/07/2023    CHOLHDL 25.0 03/01/2023    CHOLHDL 23.1 06/22/2021         PHYSICAL EXAMINATION  Constitutional: Appears well, no distress. Reviewed vitals above.  Eyes: conjunctivae & lids intact; PERRLA, EOMs intact; optic discs   Neck: Supple, trachea midline.   Respiratory: no wheezes, even and unlabored  Cardiovascular: RRR; s1s2   Lymph: deferred   Skin: warm and dry; no injection site reactions, no acanthosis nigracans observed.  Neuro:patient alert and cooperative, normal affect; steady gait.  Psychiatric: judgement & insight intact, orientation of time, place & person intact, memory; mood & affect wnl     Diabetes Foot Exam:   Deferred     Assessment/Plan    1. Type 2 diabetes mellitus with stage 3a chronic kidney disease, with long-term current use of insulin  Hemoglobin A1C    Hemoglobin A1C      2. Stage 3b chronic kidney disease        3. Type 2 diabetes mellitus with both eyes affected by moderate nonproliferative retinopathy without macular edema, without long-term current use of insulin        4. Prophylactic immunotherapy        5. Persistent atrial fibrillation        6. PAD (peripheral artery disease)        7. Primary hypertension        8. Mixed hyperlipidemia        9. Diabetic  polyneuropathy associated with type 2 diabetes mellitus  Ambulatory referral/consult to Podiatry      10. Chronic diastolic congestive heart failure        11. Walking difficulty due to lower leg  Ambulatory referral/consult to Vascular Medicine        1.-11. Follow up in 3-4 months w/ me   Send rx baqsimi  Hand out given on hypo/how to treat  Change lantus 16 units in am  novolog 18-18-14 units before meals  Scale use 180-230+2 ,etc  Cut by 6 units if sugar is less than 110  A1c 11/14/23  Continue using cgm maycol   A1c in 3 months   A1c goal less than 7.5%  Continue jardiance 10 mg daily   Podiatry 2023   Bp slightly elevated  Lab Results   Component Value Date    LDLCALC 61.4 (L) 06/07/2023     At goal   F/u with vascular med       FOLLOW UP  Follow up in about 3 months (around 2/8/2024).     Time: 30 mins

## 2023-11-08 ENCOUNTER — OFFICE VISIT (OUTPATIENT)
Dept: INTERNAL MEDICINE | Facility: CLINIC | Age: 68
End: 2023-11-08
Payer: MEDICARE

## 2023-11-08 VITALS
DIASTOLIC BLOOD PRESSURE: 78 MMHG | HEIGHT: 73 IN | OXYGEN SATURATION: 98 % | SYSTOLIC BLOOD PRESSURE: 140 MMHG | BODY MASS INDEX: 27.2 KG/M2 | WEIGHT: 205.25 LBS | HEART RATE: 58 BPM

## 2023-11-08 DIAGNOSIS — Z29.89 PROPHYLACTIC IMMUNOTHERAPY: ICD-10-CM

## 2023-11-08 DIAGNOSIS — Z79.4 TYPE 2 DIABETES MELLITUS WITH STAGE 3A CHRONIC KIDNEY DISEASE, WITH LONG-TERM CURRENT USE OF INSULIN: Primary | ICD-10-CM

## 2023-11-08 DIAGNOSIS — E11.22 TYPE 2 DIABETES MELLITUS WITH STAGE 3A CHRONIC KIDNEY DISEASE, WITH LONG-TERM CURRENT USE OF INSULIN: Primary | ICD-10-CM

## 2023-11-08 DIAGNOSIS — E78.2 MIXED HYPERLIPIDEMIA: ICD-10-CM

## 2023-11-08 DIAGNOSIS — E11.3393 TYPE 2 DIABETES MELLITUS WITH BOTH EYES AFFECTED BY MODERATE NONPROLIFERATIVE RETINOPATHY WITHOUT MACULAR EDEMA, WITHOUT LONG-TERM CURRENT USE OF INSULIN: ICD-10-CM

## 2023-11-08 DIAGNOSIS — N18.31 TYPE 2 DIABETES MELLITUS WITH STAGE 3A CHRONIC KIDNEY DISEASE, WITH LONG-TERM CURRENT USE OF INSULIN: Primary | ICD-10-CM

## 2023-11-08 DIAGNOSIS — I73.9 PAD (PERIPHERAL ARTERY DISEASE): ICD-10-CM

## 2023-11-08 DIAGNOSIS — N18.32 STAGE 3B CHRONIC KIDNEY DISEASE: ICD-10-CM

## 2023-11-08 DIAGNOSIS — I48.19 PERSISTENT ATRIAL FIBRILLATION: ICD-10-CM

## 2023-11-08 DIAGNOSIS — I50.32 CHRONIC DIASTOLIC CONGESTIVE HEART FAILURE: ICD-10-CM

## 2023-11-08 DIAGNOSIS — I10 PRIMARY HYPERTENSION: ICD-10-CM

## 2023-11-08 DIAGNOSIS — E11.42 DIABETIC POLYNEUROPATHY ASSOCIATED WITH TYPE 2 DIABETES MELLITUS: ICD-10-CM

## 2023-11-08 DIAGNOSIS — R26.2 WALKING DIFFICULTY DUE TO LOWER LEG: ICD-10-CM

## 2023-11-08 PROCEDURE — 3288F FALL RISK ASSESSMENT DOCD: CPT | Mod: CPTII,S$GLB,, | Performed by: NURSE PRACTITIONER

## 2023-11-08 PROCEDURE — 1160F PR REVIEW ALL MEDS BY PRESCRIBER/CLIN PHARMACIST DOCUMENTED: ICD-10-PCS | Mod: CPTII,S$GLB,, | Performed by: NURSE PRACTITIONER

## 2023-11-08 PROCEDURE — G0008 FLU VACCINE - QUADRIVALENT - ADJUVANTED: ICD-10-PCS | Mod: S$GLB,,, | Performed by: NURSE PRACTITIONER

## 2023-11-08 PROCEDURE — 3051F PR MOST RECENT HEMOGLOBIN A1C LEVEL 7.0 - < 8.0%: ICD-10-PCS | Mod: CPTII,S$GLB,, | Performed by: NURSE PRACTITIONER

## 2023-11-08 PROCEDURE — G0008 ADMIN INFLUENZA VIRUS VAC: HCPCS | Mod: S$GLB,,, | Performed by: NURSE PRACTITIONER

## 2023-11-08 PROCEDURE — 3062F PR POS MACROALBUMINURIA RESULT DOCUMENTED/REVIEW: ICD-10-PCS | Mod: CPTII,S$GLB,, | Performed by: NURSE PRACTITIONER

## 2023-11-08 PROCEDURE — 1126F AMNT PAIN NOTED NONE PRSNT: CPT | Mod: CPTII,S$GLB,, | Performed by: NURSE PRACTITIONER

## 2023-11-08 PROCEDURE — 3008F PR BODY MASS INDEX (BMI) DOCUMENTED: ICD-10-PCS | Mod: CPTII,S$GLB,, | Performed by: NURSE PRACTITIONER

## 2023-11-08 PROCEDURE — 95251 PR GLUCOSE MONITOR, 72 HOUR, PHYS INTERP: ICD-10-PCS | Mod: S$GLB,,, | Performed by: NURSE PRACTITIONER

## 2023-11-08 PROCEDURE — 3077F PR MOST RECENT SYSTOLIC BLOOD PRESSURE >= 140 MM HG: ICD-10-PCS | Mod: CPTII,S$GLB,, | Performed by: NURSE PRACTITIONER

## 2023-11-08 PROCEDURE — 3078F DIAST BP <80 MM HG: CPT | Mod: CPTII,S$GLB,, | Performed by: NURSE PRACTITIONER

## 2023-11-08 PROCEDURE — 4010F ACE/ARB THERAPY RXD/TAKEN: CPT | Mod: CPTII,S$GLB,, | Performed by: NURSE PRACTITIONER

## 2023-11-08 PROCEDURE — 4010F PR ACE/ARB THEARPY RXD/TAKEN: ICD-10-PCS | Mod: CPTII,S$GLB,, | Performed by: NURSE PRACTITIONER

## 2023-11-08 PROCEDURE — 90694 VACC AIIV4 NO PRSRV 0.5ML IM: CPT | Mod: S$GLB,,, | Performed by: NURSE PRACTITIONER

## 2023-11-08 PROCEDURE — 3288F PR FALLS RISK ASSESSMENT DOCUMENTED: ICD-10-PCS | Mod: CPTII,S$GLB,, | Performed by: NURSE PRACTITIONER

## 2023-11-08 PROCEDURE — 3078F PR MOST RECENT DIASTOLIC BLOOD PRESSURE < 80 MM HG: ICD-10-PCS | Mod: CPTII,S$GLB,, | Performed by: NURSE PRACTITIONER

## 2023-11-08 PROCEDURE — 3008F BODY MASS INDEX DOCD: CPT | Mod: CPTII,S$GLB,, | Performed by: NURSE PRACTITIONER

## 2023-11-08 PROCEDURE — 3072F LOW RISK FOR RETINOPATHY: CPT | Mod: CPTII,S$GLB,, | Performed by: NURSE PRACTITIONER

## 2023-11-08 PROCEDURE — 1101F PR PT FALLS ASSESS DOC 0-1 FALLS W/OUT INJ PAST YR: ICD-10-PCS | Mod: CPTII,S$GLB,, | Performed by: NURSE PRACTITIONER

## 2023-11-08 PROCEDURE — 99999 PR PBB SHADOW E&M-EST. PATIENT-LVL V: CPT | Mod: PBBFAC,,, | Performed by: NURSE PRACTITIONER

## 2023-11-08 PROCEDURE — 1126F PR PAIN SEVERITY QUANTIFIED, NO PAIN PRESENT: ICD-10-PCS | Mod: CPTII,S$GLB,, | Performed by: NURSE PRACTITIONER

## 2023-11-08 PROCEDURE — 1159F PR MEDICATION LIST DOCUMENTED IN MEDICAL RECORD: ICD-10-PCS | Mod: CPTII,S$GLB,, | Performed by: NURSE PRACTITIONER

## 2023-11-08 PROCEDURE — 1159F MED LIST DOCD IN RCRD: CPT | Mod: CPTII,S$GLB,, | Performed by: NURSE PRACTITIONER

## 2023-11-08 PROCEDURE — 95251 CONT GLUC MNTR ANALYSIS I&R: CPT | Mod: S$GLB,,, | Performed by: NURSE PRACTITIONER

## 2023-11-08 PROCEDURE — 3072F PR LOW RISK FOR RETINOPATHY: ICD-10-PCS | Mod: CPTII,S$GLB,, | Performed by: NURSE PRACTITIONER

## 2023-11-08 PROCEDURE — 99999 PR PBB SHADOW E&M-EST. PATIENT-LVL V: ICD-10-PCS | Mod: PBBFAC,,, | Performed by: NURSE PRACTITIONER

## 2023-11-08 PROCEDURE — 3051F HG A1C>EQUAL 7.0%<8.0%: CPT | Mod: CPTII,S$GLB,, | Performed by: NURSE PRACTITIONER

## 2023-11-08 PROCEDURE — 3077F SYST BP >= 140 MM HG: CPT | Mod: CPTII,S$GLB,, | Performed by: NURSE PRACTITIONER

## 2023-11-08 PROCEDURE — 3066F NEPHROPATHY DOC TX: CPT | Mod: CPTII,S$GLB,, | Performed by: NURSE PRACTITIONER

## 2023-11-08 PROCEDURE — 1101F PT FALLS ASSESS-DOCD LE1/YR: CPT | Mod: CPTII,S$GLB,, | Performed by: NURSE PRACTITIONER

## 2023-11-08 PROCEDURE — 3062F POS MACROALBUMINURIA REV: CPT | Mod: CPTII,S$GLB,, | Performed by: NURSE PRACTITIONER

## 2023-11-08 PROCEDURE — 90694 FLU VACCINE - QUADRIVALENT - ADJUVANTED: ICD-10-PCS | Mod: S$GLB,,, | Performed by: NURSE PRACTITIONER

## 2023-11-08 PROCEDURE — 3066F PR DOCUMENTATION OF TREATMENT FOR NEPHROPATHY: ICD-10-PCS | Mod: CPTII,S$GLB,, | Performed by: NURSE PRACTITIONER

## 2023-11-08 PROCEDURE — 99214 OFFICE O/P EST MOD 30 MIN: CPT | Mod: S$GLB,,, | Performed by: NURSE PRACTITIONER

## 2023-11-08 PROCEDURE — 1160F RVW MEDS BY RX/DR IN RCRD: CPT | Mod: CPTII,S$GLB,, | Performed by: NURSE PRACTITIONER

## 2023-11-08 PROCEDURE — 99214 PR OFFICE/OUTPT VISIT, EST, LEVL IV, 30-39 MIN: ICD-10-PCS | Mod: S$GLB,,, | Performed by: NURSE PRACTITIONER

## 2023-11-08 RX ORDER — INSULIN GLARGINE 100 [IU]/ML
16 INJECTION, SOLUTION SUBCUTANEOUS EVERY MORNING
Qty: 15 ML | Refills: 6
Start: 2023-11-08 | End: 2024-03-14

## 2023-11-08 RX ORDER — INSULIN ASPART 100 [IU]/ML
INJECTION, SOLUTION INTRAVENOUS; SUBCUTANEOUS
Qty: 30 ML | Refills: 6
Start: 2023-11-08 | End: 2024-03-14

## 2023-11-08 RX ORDER — GLUCAGON 3 MG/1
POWDER NASAL
Qty: 2 EACH | Refills: 2 | Status: SHIPPED | OUTPATIENT
Start: 2023-11-08 | End: 2023-12-19

## 2023-11-08 NOTE — PATIENT INSTRUCTIONS
Change lantus 16 units in am-24 hour insulin (morning)  novolog 18-18-14 units before meals-fast acting insulin orange/blue)  Scale use 180-230+2 ,etc  Cut by 6 units if sugar is less than 110 (12-12-8 units)  Continue jardiance 10 mg daily     Dr. Gottlieb vascular medicine referral   Covid booster  Influenza hd today   Lab Results   Component Value Date    HGBA1C 7.5 (H) 06/07/2023     Goal less than 7.5%    A1c 11/14/23   A1c in 3 months  Follow up in 3-4 months w/ Karan    Continue maycol- [facility]    [email or web address]  Eat fit marion  Myfitnesspal marion  [email or web address]    mySugr marion     Baqsimi- severe hypoglycemia < 50 sugar level -emergency     Goal  no higher than 180

## 2023-11-14 ENCOUNTER — TELEPHONE (OUTPATIENT)
Dept: INTERNAL MEDICINE | Facility: CLINIC | Age: 68
End: 2023-11-14
Payer: MEDICARE

## 2023-11-14 ENCOUNTER — LAB VISIT (OUTPATIENT)
Dept: LAB | Facility: HOSPITAL | Age: 68
End: 2023-11-14
Attending: INTERNAL MEDICINE
Payer: MEDICARE

## 2023-11-14 DIAGNOSIS — Z94.0 KIDNEY REPLACED BY TRANSPLANT: ICD-10-CM

## 2023-11-14 DIAGNOSIS — N18.31 TYPE 2 DIABETES MELLITUS WITH STAGE 3A CHRONIC KIDNEY DISEASE, WITH LONG-TERM CURRENT USE OF INSULIN: ICD-10-CM

## 2023-11-14 DIAGNOSIS — Z79.4 TYPE 2 DIABETES MELLITUS WITH STAGE 3A CHRONIC KIDNEY DISEASE, WITH LONG-TERM CURRENT USE OF INSULIN: ICD-10-CM

## 2023-11-14 DIAGNOSIS — E11.22 TYPE 2 DIABETES MELLITUS WITH STAGE 3A CHRONIC KIDNEY DISEASE, WITH LONG-TERM CURRENT USE OF INSULIN: ICD-10-CM

## 2023-11-14 LAB
ALBUMIN SERPL BCP-MCNC: 3.3 G/DL (ref 3.5–5.2)
ALP SERPL-CCNC: 52 U/L (ref 55–135)
ALT SERPL W/O P-5'-P-CCNC: <5 U/L (ref 10–44)
ANION GAP SERPL CALC-SCNC: 8 MMOL/L (ref 8–16)
AST SERPL-CCNC: 9 U/L (ref 10–40)
BASOPHILS # BLD AUTO: 0.02 K/UL (ref 0–0.2)
BASOPHILS NFR BLD: 0.4 % (ref 0–1.9)
BILIRUB SERPL-MCNC: 0.3 MG/DL (ref 0.1–1)
BUN SERPL-MCNC: 22 MG/DL (ref 8–23)
CALCIUM SERPL-MCNC: 9.1 MG/DL (ref 8.7–10.5)
CHLORIDE SERPL-SCNC: 106 MMOL/L (ref 95–110)
CO2 SERPL-SCNC: 21 MMOL/L (ref 23–29)
CREAT SERPL-MCNC: 1.7 MG/DL (ref 0.5–1.4)
DIFFERENTIAL METHOD: ABNORMAL
EOSINOPHIL # BLD AUTO: 0.1 K/UL (ref 0–0.5)
EOSINOPHIL NFR BLD: 1.7 % (ref 0–8)
ERYTHROCYTE [DISTWIDTH] IN BLOOD BY AUTOMATED COUNT: 15.5 % (ref 11.5–14.5)
EST. GFR  (NO RACE VARIABLE): 43.9 ML/MIN/1.73 M^2
ESTIMATED AVG GLUCOSE: 194 MG/DL (ref 68–131)
GLUCOSE SERPL-MCNC: 150 MG/DL (ref 70–110)
HBA1C MFR BLD: 8.4 % (ref 4–5.6)
HCT VFR BLD AUTO: 32.1 % (ref 40–54)
HGB BLD-MCNC: 9.1 G/DL (ref 14–18)
IMM GRANULOCYTES # BLD AUTO: 0.14 K/UL (ref 0–0.04)
IMM GRANULOCYTES NFR BLD AUTO: 2.9 % (ref 0–0.5)
LYMPHOCYTES # BLD AUTO: 0.4 K/UL (ref 1–4.8)
LYMPHOCYTES NFR BLD: 7.3 % (ref 18–48)
MCH RBC QN AUTO: 22.5 PG (ref 27–31)
MCHC RBC AUTO-ENTMCNC: 28.3 G/DL (ref 32–36)
MCV RBC AUTO: 79 FL (ref 82–98)
MONOCYTES # BLD AUTO: 1.1 K/UL (ref 0.3–1)
MONOCYTES NFR BLD: 21.8 % (ref 4–15)
NEUTROPHILS # BLD AUTO: 3.2 K/UL (ref 1.8–7.7)
NEUTROPHILS NFR BLD: 65.9 % (ref 38–73)
NRBC BLD-RTO: 0 /100 WBC
PLATELET # BLD AUTO: 130 K/UL (ref 150–450)
PMV BLD AUTO: ABNORMAL FL (ref 9.2–12.9)
POTASSIUM SERPL-SCNC: 5.3 MMOL/L (ref 3.5–5.1)
PROT SERPL-MCNC: 6.1 G/DL (ref 6–8.4)
RBC # BLD AUTO: 4.05 M/UL (ref 4.6–6.2)
SODIUM SERPL-SCNC: 135 MMOL/L (ref 136–145)
TACROLIMUS BLD-MCNC: 9.4 NG/ML (ref 5–15)
WBC # BLD AUTO: 4.82 K/UL (ref 3.9–12.7)

## 2023-11-14 PROCEDURE — 80053 COMPREHEN METABOLIC PANEL: CPT | Performed by: INTERNAL MEDICINE

## 2023-11-14 PROCEDURE — 85025 COMPLETE CBC W/AUTO DIFF WBC: CPT | Performed by: INTERNAL MEDICINE

## 2023-11-14 PROCEDURE — 83036 HEMOGLOBIN GLYCOSYLATED A1C: CPT | Performed by: NURSE PRACTITIONER

## 2023-11-14 PROCEDURE — 36415 COLL VENOUS BLD VENIPUNCTURE: CPT | Performed by: INTERNAL MEDICINE

## 2023-11-14 PROCEDURE — 80197 ASSAY OF TACROLIMUS: CPT | Performed by: INTERNAL MEDICINE

## 2023-11-14 NOTE — TELEPHONE ENCOUNTER
Reached out to pt for blood sugar readings 1am 116   66 @ 1113p    942p- 78    922p- 79    902p- 79    810- 254p   729p -261    705p 268      633p -204     407p   -255       198@ 916am  He took meds near Lantus 14u + Novolog 18u    labs this morning    ----- Message from REYES Rai, FNP sent at 11/14/2023 11:34 AM CST -----  Regarding: a1c  Hi!  A1c-- went up 8.4%  Pt recently seen   A1c went from 7.5 to 8.4%  Needs readings for the past week   May need to go up with medications   Keep me posted  Transplant pt

## 2023-11-14 NOTE — PROGRESS NOTES
Low K diet  Advise patient to continue follow up with his local nephrologist and PCP duet to high normal K on valsartan and sustained anemia in the last 1 year with reduced h/h and low MCV to r/o GI bleed

## 2023-11-15 ENCOUNTER — TELEPHONE (OUTPATIENT)
Dept: INTERNAL MEDICINE | Facility: CLINIC | Age: 68
End: 2023-11-15
Payer: MEDICARE

## 2023-11-15 NOTE — TELEPHONE ENCOUNTER
----- Message from Marlena Carty sent at 11/15/2023 11:59 AM CST -----  Contact: [phone]  [email or web address]     Patient is calling for Medical Advice regarding: pt is  requesting a call back     How long has patient had these symptoms:    Pharmacy name and phone#:    Would like response via Immyt:  ##call back     Comments:

## 2023-11-15 NOTE — TELEPHONE ENCOUNTER
Spoke to pt. Pt stated he received a phone call from his pharmacy regarding the Baqsimi being ready for  but he did not know what the med was for. I explained to pt what Baqsimi is. Pt stated he feels the Rx was sent because he has been having low b/g at night. I asked pt if he wanted me to send a message to nurse regarding his low b/g. Pt stated no, he will  med and call office back if he is still experiencing lows.

## 2023-11-17 DIAGNOSIS — Z94.0 KIDNEY REPLACED BY TRANSPLANT: ICD-10-CM

## 2023-11-17 RX ORDER — MYCOPHENOLATE MOFETIL 250 MG/1
1000 CAPSULE ORAL 2 TIMES DAILY
Qty: 240 CAPSULE | Refills: 11 | Status: CANCELLED | OUTPATIENT
Start: 2023-11-17

## 2023-11-17 RX ORDER — PREDNISONE 5 MG/1
5 TABLET ORAL DAILY
Qty: 30 TABLET | Refills: 11 | Status: CANCELLED | OUTPATIENT
Start: 2023-11-17

## 2023-11-17 RX ORDER — TACROLIMUS 0.5 MG/1
0.5 CAPSULE ORAL EVERY 12 HOURS
Qty: 60 CAPSULE | Refills: 11 | Status: CANCELLED | OUTPATIENT
Start: 2023-11-17 | End: 2024-11-16

## 2023-11-17 NOTE — PROGRESS NOTES
Kidney Post-Transplant Assessment    Referring Physician: Alexi Glasgow  Current Nephrologist: Olayinka Ewing    ORGAN: RIGHT KIDNEY  Donor Type: donation after brain death  PHS Increased Risk: yes  Cold Ischemia: 314 mins  Induction Medications: thymoglobulin    Subjective:     CC:  Reassessment of renal allograft function and management of chronic immunosuppression.    HPI:  Mr. Zamorano is a 68 y.o. 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  He has CKD stage 3 - GFR 30-59 and his baseline creatinine is between 1.7-1.9. He takes mycophenolate mofetil, prednisone and tacrolimus for maintenance immunosuppression. He denies any recent hospitalizations or ER visits since his previous clinic visit.    Hospitalization/ ED visits  none    Interval HX:  Reports doing well. Water intake currently about 2-3 sherwood a day.Reports weight loss but review weights this past year and are consistent. Has been having his endocrine manage his blood. Reporting some lows. Has started on jardiance. He held his long acting insulin but started retaking per self.  Last seen  8/2022 and has upcoming appointment. Has appointment with Urology for elevated PSA 8.3 and has Cardiologist appointment 1/2023. Has been following  Cardiology for Afib. Has an appointment with Dr. Gottlieb. Following with Urology for elevated PSAs. Has not taken his medications today yet.      Current Outpatient Medications:     apixaban (ELIQUIS) 5 mg Tab, Take 1 tablet (5 mg total) by mouth 2 (two) times daily., Disp: 60 tablet, Rfl: 11    aspirin 81 MG Chew, Take 81 mg by mouth once daily., Disp: , Rfl:     atorvastatin (LIPITOR) 40 MG tablet, Take 1 tablet (40 mg total) by mouth once daily., Disp: 90 tablet, Rfl: 3    blood-glucose sensor Gin Garza 3, use as directed, change every 14 days , e 11.65, Disp: 2 each, Rfl: 11    carvediloL (COREG) 25 MG tablet, Take 1 tablet (25  "mg total) by mouth 2 (two) times daily., Disp: 180 tablet, Rfl: 3    empagliflozin (JARDIANCE) 10 mg tablet, Take 1 tablet (10 mg total) by mouth once daily., Disp: 30 tablet, Rfl: 11    famotidine (PEPCID) 20 MG tablet, Take 1 tablet (20 mg total) by mouth every evening., Disp: 90 tablet, Rfl: 3    flecainide (TAMBOCOR) 50 MG Tab, Take 1 tablet (50 mg total) by mouth 2 (two) times a day., Disp: 180 tablet, Rfl: 0    gabapentin (NEURONTIN) 300 MG capsule, Take 1 capsule by mouth in the morning, 1 capsule midday, and 3 capsules at night., Disp: 450 capsule, Rfl: 3    glucagon (BAQSIMI) 3 mg/actuation Spry, Give one puff via nostril. Hold device between fingers and thumb, do not push plunger yet, insert tip gently into one nostril until finger(s) touch the outside of the nose, then push plunger firmly all the way in . Dose is complete when the green line disappears., Disp: 2 each, Rfl: 2    insulin (LANTUS SOLOSTAR U-100 INSULIN) glargine 100 units/mL SubQ pen, Inject 16 Units into the skin every morning., Disp: 15 mL, Rfl: 6    insulin aspart U-100 (NOVOLOG) 100 unit/mL (3 mL) InPn pen, Inject 18 units w/ breakfast and lunch, 14 units at dinner scale 180-230+2, 231-280+4, 281-330+6, 331-380+8, >380+10.  *Max daily 80 units.*, Disp: 30 mL, Rfl: 6    meclizine (ANTIVERT) 25 mg tablet, Take 1 tablet (25 mg total) by mouth 3 (three) times daily as needed for Dizziness., Disp: 21 tablet, Rfl: 0    melatonin 5 mg Tab, Take 1 tablet (5 mg total) by mouth every evening., Disp: , Rfl:     mycophenolate (CELLCEPT) 250 mg Cap, Take 4 capsules (1,000 mg total) by mouth 2 (two) times daily., Disp: 240 capsule, Rfl: 11    pen needle, diabetic (BD ULTRA-FINE LO PEN NEEDLE) 32 gauge x 5/32" Ndle, USE TO ADMINISTER INSULIN 4 TIMES DAILY., Disp: 400 each, Rfl: 3    pen needle, diabetic (BD ULTRA-FINE LO PEN NEEDLE) 32 gauge x 5/32" Ndle, use four times a day, Disp: 100 each, Rfl: 11    predniSONE (DELTASONE) 5 MG tablet, Take 1 " tablet (5 mg total) by mouth once daily., Disp: 30 tablet, Rfl: 11    spironolactone (ALDACTONE) 25 MG tablet, Take 1 tablet (25 mg total) by mouth once daily., Disp: 90 tablet, Rfl: 1    tacrolimus (PROGRAF) 0.5 MG Cap, Take 1 capsule (0.5 mg total) by mouth every 12 (twelve) hours., Disp: 60 capsule, Rfl: 11    valsartan (DIOVAN) 320 MG tablet, Take 1 tablet (320 mg total) by mouth once daily., Disp: 90 tablet, Rfl: 3      Past Medical History:   Diagnosis Date    Anxiety 7/29/2014    Arthritis     Bilateral diabetic retinopathy 2017    CKD (chronic kidney disease) stage 2, GFR 60-89 ml/min 12/28/2016    CKD (chronic kidney disease) stage 4, GFR 15-29 ml/min 7/29/2014    Colon polyps 2014    Depression - situational 7/29/2014    Diabetes mellitus     Diabetes type 2 since 2000 7/29/2014    Diabetic neuropathy 7/29/2014    History of cardioversion 10/3/2019    History of hepatitis C, s/p successful Rx w/ SVR24 - 9/2017 7/29/2014    Genotype 1a, treatment naive 10/2014 liver biopsy - grade 1 / stage 1 Completed Harvoni w/ SVR    Hyperlipidemia 7/29/2014    Hypertension     Neuropathy     Organ transplant candidate 7/29/2014    Pancreatitis     S/P cadaveric kidney transplant 11/5/2016. ESRD secondary to HTN/DMII 11/5/2016    Stage 3a chronic kidney disease 12/28/2016    Type 2 diabetes mellitus with stage 3a chronic kidney disease, with long-term current use of insulin 7/29/2014       Review of Systems   Constitutional:  Negative for appetite change, chills, fatigue and fever.   HENT:  Negative for trouble swallowing.    Respiratory:  Negative for cough, chest tightness, shortness of breath and wheezing.    Cardiovascular:  Negative for chest pain, palpitations and leg swelling.   Gastrointestinal:  Negative for abdominal pain, constipation, diarrhea and nausea.   Genitourinary:  Negative for difficulty urinating, frequency and urgency.        + frothy urine   Musculoskeletal:  Negative for arthralgias and myalgias.  "  Skin:  Negative for rash.   Neurological:  Positive for tremors (mild). Negative for dizziness, weakness, light-headedness and headaches.   Psychiatric/Behavioral:  Negative for sleep disturbance.        Objective:   Blood pressure (!) 175/80, pulse 60, temperature 97.3 °F (36.3 °C), temperature source Temporal, resp. rate 18, height 6' 1" (1.854 m), weight 91.3 kg (201 lb 4.5 oz), SpO2 100 %.body mass index is 26.56 kg/m².    Physical Exam  Constitutional:       General: He is not in acute distress.     Appearance: He is well-developed. He is not diaphoretic.   Cardiovascular:      Rate and Rhythm: Normal rate and regular rhythm.      Heart sounds: Normal heart sounds. No murmur heard.     No friction rub. No gallop.   Pulmonary:      Effort: Pulmonary effort is normal. No respiratory distress.      Breath sounds: Normal breath sounds. No wheezing or rales.   Abdominal:      General: Bowel sounds are normal. There is no distension.      Palpations: Abdomen is soft.      Tenderness: There is no abdominal tenderness.   Musculoskeletal:         General: No tenderness. Normal range of motion.   Skin:     General: Skin is warm and dry.      Findings: No rash.      Nails: There is no clubbing.   Neurological:      Mental Status: He is alert and oriented to person, place, and time.   Psychiatric:         Behavior: Behavior normal.         Labs:  Lab Results   Component Value Date    WBC 4.82 11/14/2023    HGB 9.1 (L) 11/14/2023    HCT 32.1 (L) 11/14/2023     (L) 11/14/2023    K 5.3 (H) 11/14/2023     11/14/2023    CO2 21 (L) 11/14/2023    BUN 22 11/14/2023    CREATININE 1.7 (H) 11/14/2023    EGFRNONAA 44.9 (A) 07/19/2022    CALCIUM 9.1 11/14/2023    PHOS 3.4 11/11/2022    MG 1.7 11/11/2022    ALBUMIN 3.3 (L) 11/14/2023    AST 9 (L) 11/14/2023    ALT <5 (L) 11/14/2023       No results found for: "EXTANC", "EXTWBC", "EXTSEGS", "EXTPLATELETS", "EXTHEMOGLOBI", "EXTHEMATOCRI", "EXTCREATININ", "EXTSODIUM", " ""EXTPOTASSIUM", "EXTBUN", "EXTCO2", "EXTCALCIUM", "EXTPHOSPHORU", "EXTGLUCOSE", "EXTALBUMIN", "EXTAST", "EXTALT", "EXTBILITOTAL", "EXTLIPASE", "EXTAMYLASE"    No results found for: "EXTCYCLOSLVL", "EXTSIROLIMUS", "EXTTACROLVL", "EXTPROTCRE", "EXTPTHINTACT", "EXTPROTEINUA", "EXTWBCUA", "EXTRBCUA"    Labs were reviewed with the patient.    Assessment:     1. S/P cadaveric kidney transplant 11/5/2016. ESRD secondary to HTN/DMII    2. Stage 3b chronic kidney disease    3. Primary hypertension    4. Long-term use of immunosuppressant medication        Plan:   Continue to follow with PCP and General Nephrologist  Proteinuria. Patient to follow with Dr. Willis on management, next appointment 12/11/23   A1C elevated and with low BG over night-- needs DM management, encouraged to follow with endocrine  Low K+ foods, on losartan for proteinuria, Dr. Willis managing  Afib--following with Cardiology    Per Dr. Sosa repeat Tac in 3 from last level to determine if adjustment is needed      1. CKD stage 2: will continue follow up as per our center guidelines. patient to continue close follow up with the local General nephrologist. Education provided in appropriate fluid intake, potassium intake. Continue with oral hydration.      2. Immunosuppression: Prograf trough 10.1, which is therapeutic target 4-7. Continue Prograf 0.5/0.5, MMF 1000 Mg BID, and Prednisone 5 mg QD  Lab Results   Component Value Date    TACROLIMUS 9.4 11/14/2023    TACROLIMUS 5.2 01/04/2023    TACROLIMUS 4.5 (L) 11/29/2022   Will closely monitor for toxicities, education provided about adherence to medicines and need to communicate any side effect to the transplant nurse or physician.      3. Allograft Function:stable at baseline for the patient. Continue follow up as per our guidelines and with the local General nephrologist. Communication will be sent today.    Latest Reference Range & Units 7yr 0mo,  Kidney-Post 8 Year  11/14/23   Creatinine 0.5 - 1.4 mg/dL " 1.7 (H)   eGFR >60 mL/min/1.73 m^2 43.9 !   Glucose 70 - 110 mg/dL 150 (H)     Lab Results   Component Value Date    HGBA1C 8.4 (H) 11/14/2023   Takes long and short acting insulin --deferred management to PCP/Endocrinologist      4. Hypertension management:  Continue with home blood pressure monitoring, low salt and healthy life discussed with the patient.  BP Readings from Last 3 Encounters:   11/20/23 (!) 175/80   11/08/23 (!) 140/78   05/17/23 128/60   takes coreg, nifedipine, valsartan --deferred management to General Nephrology      5. Metabolic Bone Disease/Secondary Hyperparathyroidism:calcium and phosphorus level discussed with the patient, patient will continue follow up with the general nephrologist for management of metabolic bone disease  calcium and phosphorus as per our center protocol. Will monitor PTH, Vit D level, calcium.   Lab Results   Component Value Date    .8 (H) 07/12/2022    CALCIUM 9.1 11/14/2023    CAION 1.11 11/06/2016    PHOS 3.4 11/11/2022    PHOS 2.8 10/13/2022    PHOS 3.3 08/24/2022       6. Electrolytes: reviewed with the patient, essentially within the normal range no need for acute changes today, will monitor as per our center guidelines.     11/11/2022  6yr 0mo   Magnesium 1.6 - 2.6 mg/dL 1.7     Lab Results   Component Value Date     (L) 11/14/2023    K 5.3 (H) 11/14/2023     11/14/2023    CO2 21 (L) 11/14/2023    CO2 26 03/01/2023    CO2 26 03/01/2023       7. Anemia: will continue monitoring as per our center guidelines. No indication for acute intervention today.  Lab Results   Component Value Date    WBC 4.82 11/14/2023    HGB 9.1 (L) 11/14/2023    HCT 32.1 (L) 11/14/2023    MCV 79 (L) 11/14/2023     (L) 11/14/2023       8.Proteinuria: will continue with pr/cr ratio as per our center guidelines  Lab Results   Component Value Date    PROTEINURINE 309 (H) 11/14/2023    CREATRANDUR 106.0 11/14/2023    UTPCR 2.92 (H) 11/14/2023    UTPCR 1.03 (H)  11/11/2022    UTPCR 1.35 (H) 10/13/2022   Proteinuria management deferred to General Nephrologist      9. BK virus infection screening: will continue with urine or blood PCR as per our guidelines to prevent BK virus viremia and allograft dysfunction  Lab Results   Component Value Date    BKVIRUSPCRQB <125 11/09/2021         10. Weight education: provided during the clinic visit.   Body mass index is 26.56 kg/m².       11.Patient safety education regarding immunosuppression including prophylaxis posttransplant for CMV, PCP : Education provided about vaccination and prevention of infections.    12.  Cytopenias: no significant cytopenias will monitor as per our guidelines. Medicine list reviewed including potential causes of drug-induced cytopenias  Lab Results   Component Value Date    WBC 4.82 11/14/2023    HGB 9.1 (L) 11/14/2023    HCT 32.1 (L) 11/14/2023    MCV 79 (L) 11/14/2023     (L) 11/14/2023       Follow-up:   Annual follow-up with kidney transplant clinic with repeat labs, including renal function panel with UA, urine protein/creatinine ratio, and drug trough level q3 months.  Patient also reminded to follow-up with general nephrologist.    Labs: since patient remains at high risk for rejection and drug-related complications that warrant close monitoring, labs will be ordered as follows: continue twice weekly CBC, renal panel, and drug level for first month; then same labs once weekly through 3rd month post-transplant.  Urine for UA and protein/creatinine ratio monthly.  Serum BK - PCR at 1-, 3-, 6-, 9-, 12-, 18-, 24-, 36-, 48-, and 60 months post-transplant.  Hepatic panel at 1-, 2-, 3-, 6-, 9-, 12-, 18-, 24-, and 36- months post-transplant.    Education:   Material provided to the patient.  Patient reminded to call with any health changes since these can be early signs of significant complications.  Also, I advised the patient to be sure any new medications or changes of old medications are  discussed with either a pharmacist or physician knowledgeable with transplant to avoid rejection/drug toxicity related to significant drug interactions.    Exercise: reminded Ravi of the importance of regular exercise for weight management, blood sugar and blood pressure management.  I also explained exercise has been shown to improve cardiovascular health, energy level, and sleep hygiene.  Lastly, I advised him that cardiovascular complications are leading cause of death for renal transplant recipients, and regular exercise can help lower this risk.    I spoke with the patient for 30 minutes. More than half dedicated to counseling and education. All questions answered    Emilia Abreu NP-C  Transplant Nephrology      [employer] Patient Status  Functional Status: 90% - Able to carry on normal activity: minor symptoms of disease  Physical Capacity: No Limitations

## 2023-11-20 ENCOUNTER — OFFICE VISIT (OUTPATIENT)
Dept: TRANSPLANT | Facility: CLINIC | Age: 68
End: 2023-11-20
Payer: MEDICARE

## 2023-11-20 VITALS
RESPIRATION RATE: 18 BRPM | WEIGHT: 201.25 LBS | HEIGHT: 73 IN | SYSTOLIC BLOOD PRESSURE: 175 MMHG | BODY MASS INDEX: 26.67 KG/M2 | DIASTOLIC BLOOD PRESSURE: 80 MMHG | TEMPERATURE: 97 F | OXYGEN SATURATION: 100 % | HEART RATE: 60 BPM

## 2023-11-20 DIAGNOSIS — Z94.0 KIDNEY REPLACED BY TRANSPLANT: ICD-10-CM

## 2023-11-20 DIAGNOSIS — Z94.0 S/P KIDNEY TRANSPLANT: Primary | ICD-10-CM

## 2023-11-20 DIAGNOSIS — Z79.60 LONG-TERM USE OF IMMUNOSUPPRESSANT MEDICATION: ICD-10-CM

## 2023-11-20 DIAGNOSIS — N18.32 STAGE 3B CHRONIC KIDNEY DISEASE: ICD-10-CM

## 2023-11-20 DIAGNOSIS — I10 PRIMARY HYPERTENSION: ICD-10-CM

## 2023-11-20 PROCEDURE — 3288F PR FALLS RISK ASSESSMENT DOCUMENTED: ICD-10-PCS | Mod: CPTII,S$GLB,, | Performed by: NURSE PRACTITIONER

## 2023-11-20 PROCEDURE — 1159F MED LIST DOCD IN RCRD: CPT | Mod: CPTII,S$GLB,, | Performed by: NURSE PRACTITIONER

## 2023-11-20 PROCEDURE — 3066F PR DOCUMENTATION OF TREATMENT FOR NEPHROPATHY: ICD-10-PCS | Mod: CPTII,S$GLB,, | Performed by: NURSE PRACTITIONER

## 2023-11-20 PROCEDURE — 1101F PT FALLS ASSESS-DOCD LE1/YR: CPT | Mod: CPTII,S$GLB,, | Performed by: NURSE PRACTITIONER

## 2023-11-20 PROCEDURE — 3052F PR MOST RECENT HEMOGLOBIN A1C LEVEL 8.0 - < 9.0%: ICD-10-PCS | Mod: CPTII,S$GLB,, | Performed by: NURSE PRACTITIONER

## 2023-11-20 PROCEDURE — 3052F HG A1C>EQUAL 8.0%<EQUAL 9.0%: CPT | Mod: CPTII,S$GLB,, | Performed by: NURSE PRACTITIONER

## 2023-11-20 PROCEDURE — 99215 PR OFFICE/OUTPT VISIT, EST, LEVL V, 40-54 MIN: ICD-10-PCS | Mod: S$GLB,,, | Performed by: NURSE PRACTITIONER

## 2023-11-20 PROCEDURE — 3062F POS MACROALBUMINURIA REV: CPT | Mod: CPTII,S$GLB,, | Performed by: NURSE PRACTITIONER

## 2023-11-20 PROCEDURE — 99999 PR PBB SHADOW E&M-EST. PATIENT-LVL IV: CPT | Mod: PBBFAC,,, | Performed by: NURSE PRACTITIONER

## 2023-11-20 PROCEDURE — 3062F PR POS MACROALBUMINURIA RESULT DOCUMENTED/REVIEW: ICD-10-PCS | Mod: CPTII,S$GLB,, | Performed by: NURSE PRACTITIONER

## 2023-11-20 PROCEDURE — 3072F LOW RISK FOR RETINOPATHY: CPT | Mod: CPTII,S$GLB,, | Performed by: NURSE PRACTITIONER

## 2023-11-20 PROCEDURE — 4010F PR ACE/ARB THEARPY RXD/TAKEN: ICD-10-PCS | Mod: CPTII,S$GLB,, | Performed by: NURSE PRACTITIONER

## 2023-11-20 PROCEDURE — 3008F BODY MASS INDEX DOCD: CPT | Mod: CPTII,S$GLB,, | Performed by: NURSE PRACTITIONER

## 2023-11-20 PROCEDURE — 3077F SYST BP >= 140 MM HG: CPT | Mod: CPTII,S$GLB,, | Performed by: NURSE PRACTITIONER

## 2023-11-20 PROCEDURE — 1159F PR MEDICATION LIST DOCUMENTED IN MEDICAL RECORD: ICD-10-PCS | Mod: CPTII,S$GLB,, | Performed by: NURSE PRACTITIONER

## 2023-11-20 PROCEDURE — 99215 OFFICE O/P EST HI 40 MIN: CPT | Mod: S$GLB,,, | Performed by: NURSE PRACTITIONER

## 2023-11-20 PROCEDURE — 3072F PR LOW RISK FOR RETINOPATHY: ICD-10-PCS | Mod: CPTII,S$GLB,, | Performed by: NURSE PRACTITIONER

## 2023-11-20 PROCEDURE — 99999 PR PBB SHADOW E&M-EST. PATIENT-LVL IV: ICD-10-PCS | Mod: PBBFAC,,, | Performed by: NURSE PRACTITIONER

## 2023-11-20 PROCEDURE — 3288F FALL RISK ASSESSMENT DOCD: CPT | Mod: CPTII,S$GLB,, | Performed by: NURSE PRACTITIONER

## 2023-11-20 PROCEDURE — 1101F PR PT FALLS ASSESS DOC 0-1 FALLS W/OUT INJ PAST YR: ICD-10-PCS | Mod: CPTII,S$GLB,, | Performed by: NURSE PRACTITIONER

## 2023-11-20 PROCEDURE — 4010F ACE/ARB THERAPY RXD/TAKEN: CPT | Mod: CPTII,S$GLB,, | Performed by: NURSE PRACTITIONER

## 2023-11-20 PROCEDURE — 3079F DIAST BP 80-89 MM HG: CPT | Mod: CPTII,S$GLB,, | Performed by: NURSE PRACTITIONER

## 2023-11-20 PROCEDURE — 1126F AMNT PAIN NOTED NONE PRSNT: CPT | Mod: CPTII,S$GLB,, | Performed by: NURSE PRACTITIONER

## 2023-11-20 PROCEDURE — 3077F PR MOST RECENT SYSTOLIC BLOOD PRESSURE >= 140 MM HG: ICD-10-PCS | Mod: CPTII,S$GLB,, | Performed by: NURSE PRACTITIONER

## 2023-11-20 PROCEDURE — 3079F PR MOST RECENT DIASTOLIC BLOOD PRESSURE 80-89 MM HG: ICD-10-PCS | Mod: CPTII,S$GLB,, | Performed by: NURSE PRACTITIONER

## 2023-11-20 PROCEDURE — 3008F PR BODY MASS INDEX (BMI) DOCUMENTED: ICD-10-PCS | Mod: CPTII,S$GLB,, | Performed by: NURSE PRACTITIONER

## 2023-11-20 PROCEDURE — 1126F PR PAIN SEVERITY QUANTIFIED, NO PAIN PRESENT: ICD-10-PCS | Mod: CPTII,S$GLB,, | Performed by: NURSE PRACTITIONER

## 2023-11-20 PROCEDURE — 3066F NEPHROPATHY DOC TX: CPT | Mod: CPTII,S$GLB,, | Performed by: NURSE PRACTITIONER

## 2023-11-20 RX ORDER — TACROLIMUS 0.5 MG/1
0.5 CAPSULE ORAL EVERY 12 HOURS
Qty: 60 CAPSULE | Refills: 11 | Status: SHIPPED | OUTPATIENT
Start: 2023-11-20 | End: 2024-11-19

## 2023-11-20 RX ORDER — PREDNISONE 5 MG/1
5 TABLET ORAL DAILY
Qty: 30 TABLET | Refills: 11 | Status: SHIPPED | OUTPATIENT
Start: 2023-11-20

## 2023-11-20 RX ORDER — MYCOPHENOLATE MOFETIL 250 MG/1
1000 CAPSULE ORAL 2 TIMES DAILY
Qty: 240 CAPSULE | Refills: 11 | Status: ON HOLD | OUTPATIENT
Start: 2023-11-20 | End: 2024-04-03

## 2023-11-20 RX ORDER — TACROLIMUS 0.5 MG/1
0.5 CAPSULE ORAL EVERY 12 HOURS
Qty: 60 CAPSULE | Refills: 11 | Status: CANCELLED | OUTPATIENT
Start: 2023-11-17 | End: 2024-11-16

## 2023-11-20 RX ORDER — MYCOPHENOLATE MOFETIL 250 MG/1
1000 CAPSULE ORAL 2 TIMES DAILY
Qty: 240 CAPSULE | Refills: 11 | Status: CANCELLED | OUTPATIENT
Start: 2023-11-17

## 2023-11-20 RX ORDER — PREDNISONE 5 MG/1
5 TABLET ORAL DAILY
Qty: 30 TABLET | Refills: 11 | Status: CANCELLED | OUTPATIENT
Start: 2023-11-17

## 2023-11-20 NOTE — LETTER
November 20, 2023        Olayinka Ewing  [address]  [facility] [address]  Phone: [phone]  Fax: [phone]             [facility] Trans[facility] 1st Fl  [address]  [facility] [address]  Phone: [phone]   Patient: Ravi Zamorano   MR Number: [id number]   YOB: 1955   Date of Visit: 11/20/2023       Dear Dr. Olayinka Ewing    Thank you for referring Ravi Zamorano to me for evaluation. Attached you will find relevant portions of my assessment and plan of care.    If you have questions, please do not hesitate to call me. I look forward to following Ravi Zamorano along with you.    Sincerely,    Emilia Abreu NP    Enclosure    If you would like to receive this communication electronically, please contact [email or web address] or [phone] to request [employer] access.    [employer] is a tool which provides read-only access to select patient information with whom you have a relationship. Its easy to use and provides real time access to review your patients record including encounter summaries, notes, results, and demographic information.    If you feel you have received this communication in error or would no longer like to receive these types of communications, please e-mail [email or web address]

## 2023-11-21 DIAGNOSIS — Z94.0 KIDNEY REPLACED BY TRANSPLANT: Primary | ICD-10-CM

## 2023-12-08 ENCOUNTER — LAB VISIT (OUTPATIENT)
Dept: LAB | Facility: HOSPITAL | Age: 68
End: 2023-12-08
Attending: INTERNAL MEDICINE
Payer: MEDICARE

## 2023-12-08 DIAGNOSIS — Z94.0 KIDNEY REPLACED BY TRANSPLANT: ICD-10-CM

## 2023-12-08 LAB — TACROLIMUS BLD-MCNC: 7.8 NG/ML (ref 5–15)

## 2023-12-08 PROCEDURE — 80197 ASSAY OF TACROLIMUS: CPT | Performed by: INTERNAL MEDICINE

## 2023-12-08 PROCEDURE — 36415 COLL VENOUS BLD VENIPUNCTURE: CPT | Performed by: INTERNAL MEDICINE

## 2023-12-13 LAB
ALLOSURE COMMENT: NORMAL
ALLOSURE SCORE KIDNEY: 0.11 %
INFORMATION: NORMAL

## 2023-12-19 ENCOUNTER — OFFICE VISIT (OUTPATIENT)
Dept: CARDIOLOGY | Facility: CLINIC | Age: 68
End: 2023-12-19
Payer: MEDICARE

## 2023-12-19 VITALS
HEIGHT: 73 IN | WEIGHT: 200.19 LBS | HEART RATE: 55 BPM | SYSTOLIC BLOOD PRESSURE: 152 MMHG | BODY MASS INDEX: 26.53 KG/M2 | DIASTOLIC BLOOD PRESSURE: 68 MMHG

## 2023-12-19 DIAGNOSIS — Z94.0 S/P KIDNEY TRANSPLANT: ICD-10-CM

## 2023-12-19 DIAGNOSIS — I73.9 CLAUDICATION OF BOTH LOWER EXTREMITIES: ICD-10-CM

## 2023-12-19 DIAGNOSIS — F17.200 SMOKER: ICD-10-CM

## 2023-12-19 DIAGNOSIS — E78.2 MIXED HYPERLIPIDEMIA: ICD-10-CM

## 2023-12-19 DIAGNOSIS — I73.9 PAD (PERIPHERAL ARTERY DISEASE): ICD-10-CM

## 2023-12-19 DIAGNOSIS — Z79.4 TYPE 2 DIABETES MELLITUS WITH STAGE 3A CHRONIC KIDNEY DISEASE, WITH LONG-TERM CURRENT USE OF INSULIN: ICD-10-CM

## 2023-12-19 DIAGNOSIS — I48.0 PAROXYSMAL A-FIB: ICD-10-CM

## 2023-12-19 DIAGNOSIS — R26.2 WALKING DIFFICULTY DUE TO LOWER LEG: Primary | ICD-10-CM

## 2023-12-19 DIAGNOSIS — Z79.01 ANTICOAGULANT LONG-TERM USE: ICD-10-CM

## 2023-12-19 DIAGNOSIS — N18.31 TYPE 2 DIABETES MELLITUS WITH STAGE 3A CHRONIC KIDNEY DISEASE, WITH LONG-TERM CURRENT USE OF INSULIN: ICD-10-CM

## 2023-12-19 DIAGNOSIS — E11.22 TYPE 2 DIABETES MELLITUS WITH STAGE 3A CHRONIC KIDNEY DISEASE, WITH LONG-TERM CURRENT USE OF INSULIN: ICD-10-CM

## 2023-12-19 PROCEDURE — 3008F BODY MASS INDEX DOCD: CPT | Mod: CPTII,S$GLB,, | Performed by: INTERNAL MEDICINE

## 2023-12-19 PROCEDURE — 1159F MED LIST DOCD IN RCRD: CPT | Mod: CPTII,S$GLB,, | Performed by: INTERNAL MEDICINE

## 2023-12-19 PROCEDURE — 99999 PR PBB SHADOW E&M-EST. PATIENT-LVL V: CPT | Mod: PBBFAC,,, | Performed by: INTERNAL MEDICINE

## 2023-12-19 PROCEDURE — 1126F PR PAIN SEVERITY QUANTIFIED, NO PAIN PRESENT: ICD-10-PCS | Mod: CPTII,S$GLB,, | Performed by: INTERNAL MEDICINE

## 2023-12-19 PROCEDURE — 3062F PR POS MACROALBUMINURIA RESULT DOCUMENTED/REVIEW: ICD-10-PCS | Mod: CPTII,S$GLB,, | Performed by: INTERNAL MEDICINE

## 2023-12-19 PROCEDURE — 3052F PR MOST RECENT HEMOGLOBIN A1C LEVEL 8.0 - < 9.0%: ICD-10-PCS | Mod: CPTII,S$GLB,, | Performed by: INTERNAL MEDICINE

## 2023-12-19 PROCEDURE — 3078F DIAST BP <80 MM HG: CPT | Mod: CPTII,S$GLB,, | Performed by: INTERNAL MEDICINE

## 2023-12-19 PROCEDURE — 3062F POS MACROALBUMINURIA REV: CPT | Mod: CPTII,S$GLB,, | Performed by: INTERNAL MEDICINE

## 2023-12-19 PROCEDURE — 3066F NEPHROPATHY DOC TX: CPT | Mod: CPTII,S$GLB,, | Performed by: INTERNAL MEDICINE

## 2023-12-19 PROCEDURE — 3072F LOW RISK FOR RETINOPATHY: CPT | Mod: CPTII,S$GLB,, | Performed by: INTERNAL MEDICINE

## 2023-12-19 PROCEDURE — 3008F PR BODY MASS INDEX (BMI) DOCUMENTED: ICD-10-PCS | Mod: CPTII,S$GLB,, | Performed by: INTERNAL MEDICINE

## 2023-12-19 PROCEDURE — 1126F AMNT PAIN NOTED NONE PRSNT: CPT | Mod: CPTII,S$GLB,, | Performed by: INTERNAL MEDICINE

## 2023-12-19 PROCEDURE — 3288F PR FALLS RISK ASSESSMENT DOCUMENTED: ICD-10-PCS | Mod: CPTII,S$GLB,, | Performed by: INTERNAL MEDICINE

## 2023-12-19 PROCEDURE — 1101F PR PT FALLS ASSESS DOC 0-1 FALLS W/OUT INJ PAST YR: ICD-10-PCS | Mod: CPTII,S$GLB,, | Performed by: INTERNAL MEDICINE

## 2023-12-19 PROCEDURE — 1101F PT FALLS ASSESS-DOCD LE1/YR: CPT | Mod: CPTII,S$GLB,, | Performed by: INTERNAL MEDICINE

## 2023-12-19 PROCEDURE — 99999 PR PBB SHADOW E&M-EST. PATIENT-LVL V: ICD-10-PCS | Mod: PBBFAC,,, | Performed by: INTERNAL MEDICINE

## 2023-12-19 PROCEDURE — 4010F ACE/ARB THERAPY RXD/TAKEN: CPT | Mod: CPTII,S$GLB,, | Performed by: INTERNAL MEDICINE

## 2023-12-19 PROCEDURE — 3288F FALL RISK ASSESSMENT DOCD: CPT | Mod: CPTII,S$GLB,, | Performed by: INTERNAL MEDICINE

## 2023-12-19 PROCEDURE — 3077F SYST BP >= 140 MM HG: CPT | Mod: CPTII,S$GLB,, | Performed by: INTERNAL MEDICINE

## 2023-12-19 PROCEDURE — 3052F HG A1C>EQUAL 8.0%<EQUAL 9.0%: CPT | Mod: CPTII,S$GLB,, | Performed by: INTERNAL MEDICINE

## 2023-12-19 PROCEDURE — 1159F PR MEDICATION LIST DOCUMENTED IN MEDICAL RECORD: ICD-10-PCS | Mod: CPTII,S$GLB,, | Performed by: INTERNAL MEDICINE

## 2023-12-19 PROCEDURE — 99205 PR OFFICE/OUTPT VISIT, NEW, LEVL V, 60-74 MIN: ICD-10-PCS | Mod: S$GLB,,, | Performed by: INTERNAL MEDICINE

## 2023-12-19 PROCEDURE — 3077F PR MOST RECENT SYSTOLIC BLOOD PRESSURE >= 140 MM HG: ICD-10-PCS | Mod: CPTII,S$GLB,, | Performed by: INTERNAL MEDICINE

## 2023-12-19 PROCEDURE — 4010F PR ACE/ARB THEARPY RXD/TAKEN: ICD-10-PCS | Mod: CPTII,S$GLB,, | Performed by: INTERNAL MEDICINE

## 2023-12-19 PROCEDURE — 3072F PR LOW RISK FOR RETINOPATHY: ICD-10-PCS | Mod: CPTII,S$GLB,, | Performed by: INTERNAL MEDICINE

## 2023-12-19 PROCEDURE — 99205 OFFICE O/P NEW HI 60 MIN: CPT | Mod: S$GLB,,, | Performed by: INTERNAL MEDICINE

## 2023-12-19 PROCEDURE — 3078F PR MOST RECENT DIASTOLIC BLOOD PRESSURE < 80 MM HG: ICD-10-PCS | Mod: CPTII,S$GLB,, | Performed by: INTERNAL MEDICINE

## 2023-12-19 PROCEDURE — 3066F PR DOCUMENTATION OF TREATMENT FOR NEPHROPATHY: ICD-10-PCS | Mod: CPTII,S$GLB,, | Performed by: INTERNAL MEDICINE

## 2023-12-19 NOTE — PROGRESS NOTES
[facility]      Chief Complaint   Patient presents with    Walking difficulty due to lower leg       Patient ID: Ravi Zamorano is a 68 y.o. male with pAF (on Eliquis), HTN, HLD, DM2, hx of ESRD previously on HD status post  donor kidney transplant, smoking, who presetns for an initial appointment.  Pertinent history/events are as follows:     -Pt kindly referred by Dr. Mack for evaluation of walking difficulty due to lower leg.    HPI:  Mr. Zamorano reports pain in calves and SOB after walking half a block, which improves with rest.  Symptoms started 1 year ago.  He has no rest pain or tissue loss.  Formerly smoked half a pack a day for total of 43 years.  Quit in .  PABI Study on 2023 revealed Right WENDI 0.79 with Left WENDI 0.77.  Right Leg: Segmental pressures and PVR waveforms suggest multi-level mild to moderate peripheral arterial occlusive disease.  Left Leg: Segmental pressures and PVR waveforms suggest multi-level mild to moderate peripheral arterial occlusive disease.  Findings consistent with aorto-iliac disease. Pt with normal myocardial perfusion scan in 2023.    Past Medical History:   Diagnosis Date    Anxiety 2014    Arthritis     Bilateral diabetic retinopathy     CKD (chronic kidney disease) stage 2, GFR 60-89 ml/min 2016    CKD (chronic kidney disease) stage 4, GFR 15-29 ml/min 2014    Colon polyps     Depression - situational 2014    Diabetes mellitus     Diabetes type 2 since 2014    Diabetic neuropathy 2014    History of cardioversion 10/3/2019    History of hepatitis C, s/p successful Rx w/ SVR24 - 2014    Genotype 1a, treatment naive 10/2014 liver biopsy - grade 1 / stage 1 Completed Harvoni w/ SVR    Hyperlipidemia 2014    Hypertension     Neuropathy     Organ transplant candidate 2014    Pancreatitis     S/P cadaveric kidney transplant 2016. ESRD secondary to HTN/DMII 2016     Stage 3a chronic kidney disease 2016    Type 2 diabetes mellitus with stage 3a chronic kidney disease, with long-term current use of insulin 2014     Past Surgical History:   Procedure Laterality Date    APPENDECTOMY      COLONOSCOPY N/A 2020    Procedure: COLONOSCOPY;  Surgeon: Tico Bell MD;  Location: [facility] ENDO ([address] [place] [address]);  Service: Endoscopy;  Laterality: N/A;  ok to hold eliquis per Dr. Valadez, see telephone encounter 2020-MS  covid test  [place]    KIDNEY TRANSPLANT      TRANSESOPHAGEAL ECHOCARDIOGRAPHY N/A 2019    Procedure: ECHOCARDIOGRAM, TRANSESOPHAGEAL;  Surgeon: Dolores Diagnostic Provider;  Location: [facility] EP LAB;  Service: Cardiology;  Laterality: N/A;    TREATMENT OF CARDIAC ARRHYTHMIA N/A 2019    Procedure: CARDIOVERSION;  Surgeon: Bronson Bowden MD;  Location: [facility] EP LAB;  Service: Cardiology;  Laterality: N/A;  AF, FELICIANO, DCCV, MAC, GP, 3 PREP     Social History     Socioeconomic History    Marital status:    Occupational History     Employer: [employer]   Tobacco Use    Smoking status: Former     Current packs/day: 0.00     Average packs/day: 0.5 packs/day for 32.0 years (16.0 ttl pk-yrs)     Types: Cigarettes     Start date: 1984     Quit date: 2016     Years since quittin.0    Smokeless tobacco: Never    Tobacco comments:     Pt reports that he quit in , but started up again in . pt reports he is currently working on quitting again   Substance and Sexual Activity    Alcohol use: Yes     Comment: Pt reports occasional beers on Sundays. Pt reports drinking daily prior to ESRD diagnosis.    Drug use: No    Sexual activity: Yes     Partners: Female   Social History Narrative     for 14 years     for 29 years    2 children ages 35, 36     Social Determinants of Health     Financial Resource Strain: Low Risk  (10/10/2023)    Overall Financial Resource Strain (CARDIA)     Difficulty of Paying Living  Expenses: Not hard at all   Food Insecurity: No Food Insecurity (10/10/2023)    Hunger Vital Sign     Worried About Running Out of Food in the Last Year: Never true     Ran Out of Food in the Last Year: Never true   Transportation Needs: No Transportation Needs (10/10/2023)    PRAPARE - Transportation     Lack of Transportation (Medical): No     Lack of Transportation (Non-Medical): No   Physical Activity: Inactive (10/10/2023)    Exercise Vital Sign     Days of Exercise per Week: 0 days     Minutes of Exercise per Session: 0 min   Stress: No Stress Concern Present (10/10/2023)    [place] [employer] - Occupational Stress Questionnaire     Feeling of Stress : Not at all   Social Connections: Moderately Isolated (10/10/2023)    Social Connection and Isolation Panel [NHANES]     Frequency of Communication with Friends and Family: More than three times a week     Frequency of Social Gatherings with Friends and Family: Once a week     Attends Rastafarian Services: Never     Active Member of Clubs or Organizations: No     Attends Club or Organization Meetings: Never     Marital Status:    Housing Stability: Unknown (10/10/2023)    Housing Stability Vital Sign     Unable to Pay for Housing in the Last Year: No     Unstable Housing in the Last Year: No     Family History   Problem Relation Age of Onset    Diabetes Mother     Hypertension Mother     Heart disease Mother         CAD with PCI    Heart disease Father     Cancer Brother         one sister with breast cancer    Hypertension Brother         one sister with HTN and borderline DM    Stroke Neg Hx     Kidney disease Neg Hx        Review of patient's allergies indicates:   Allergen Reactions    Nifedipine Other (See Comments)     Gingival hyperplasia       Medication List with Changes/Refills   Current Medications    APIXABAN (ELIQUIS) 5 MG TAB    Take 1 tablet (5 mg total) by mouth 2 (two) times daily.    ASPIRIN 81 MG CHEW    Take 81 mg by  "mouth once daily.    ATORVASTATIN (LIPITOR) 40 MG TABLET    Take 1 tablet (40 mg total) by mouth once daily.    BLOOD-GLUCOSE SENSOR SHIKHA    Gin 3, use as directed, change every 14 days , e 11.65    CARVEDILOL (COREG) 25 MG TABLET    Take 1 tablet (25 mg total) by mouth 2 (two) times daily.    EMPAGLIFLOZIN (JARDIANCE) 10 MG TABLET    Take 1 tablet (10 mg total) by mouth once daily.    FAMOTIDINE (PEPCID) 20 MG TABLET    Take 1 tablet (20 mg total) by mouth every evening.    FLECAINIDE (TAMBOCOR) 50 MG TAB    Take 1 tablet (50 mg total) by mouth 2 (two) times a day.    GABAPENTIN (NEURONTIN) 300 MG CAPSULE    Take 1 capsule by mouth in the morning, 1 capsule midday, and 3 capsules at night.    INSULIN (LANTUS SOLOSTAR U-100 INSULIN) GLARGINE 100 UNITS/ML SUBQ PEN    Inject 16 Units into the skin every morning.    INSULIN ASPART U-100 (NOVOLOG) 100 UNIT/ML (3 ML) INPN PEN    Inject 18 units w/ breakfast and lunch, 14 units at dinner scale 180-230+2, 231-280+4, 281-330+6, 331-380+8, >380+10.  *Max daily 80 units.*    MECLIZINE (ANTIVERT) 25 MG TABLET    Take 1 tablet (25 mg total) by mouth 3 (three) times daily as needed for Dizziness.    MELATONIN 5 MG TAB    Take 1 tablet (5 mg total) by mouth every evening.    MYCOPHENOLATE (CELLCEPT) 250 MG CAP    Take 4 capsules (1,000 mg total) by mouth 2 (two) times daily.    PEN NEEDLE, DIABETIC (BD ULTRA-FINE LO PEN NEEDLE) 32 GAUGE X 5/32" NDLE    USE TO ADMINISTER INSULIN 4 TIMES DAILY.    PEN NEEDLE, DIABETIC (BD ULTRA-FINE LO PEN NEEDLE) 32 GAUGE X 5/32" NDLE    use four times a day    PREDNISONE (DELTASONE) 5 MG TABLET    Take 1 tablet (5 mg total) by mouth once daily.    SPIRONOLACTONE (ALDACTONE) 25 MG TABLET    Take 1 tablet (25 mg total) by mouth once daily.    TACROLIMUS (PROGRAF) 0.5 MG CAP    Take 1 capsule (0.5 mg total) by mouth every 12 (twelve) hours.    VALSARTAN (DIOVAN) 320 MG TABLET    Take 1 tablet (320 mg total) by mouth once daily.   Discontinued " "Medications    GLUCAGON (BAQSIMI) 3 MG/ACTUATION SPRY    Give one puff via nostril. Hold device between fingers and thumb, do not push plunger yet, insert tip gently into one nostril until finger(s) touch the outside of the nose, then push plunger firmly all the way in . Dose is complete when the green line disappears.       Review of Systems  Constitution: Denies chills, fever, and sweats.  HENT: Denies headaches or blurry vision.  Cardiovascular: Denies chest pain or irregular heart beat.  Respiratory: Positive for shortness of breath after walking half a block.   Gastrointestinal: Denies abdominal pain, nausea, or vomiting.  Musculoskeletal: Positive for pain in legs when walking.   Neurological: Denies dizziness or focal weakness.  Psychiatric/Behavioral: Normal mental status.  Hematologic/Lymphatic: Denies bleeding problem or easy bruising/bleeding.  Skin: Denies rash or suspicious lesions    Physical Examination  BP (!) 152/68   Pulse (!) 55   Ht 6' 1" (1.854 m)   Wt 90.8 kg (200 lb 2.8 oz)   BMI 26.41 kg/m²     Constitutional: No acute distress, conversant  HEENT: Sclera anicteric, Pupils equal, round and reactive to light, extraocular motions intact, Oropharynx clear  Neck: No JVD, no carotid bruits  Cardiovascular: regular rate and rhythm, no murmur, rubs or gallops, normal S1/S2  Pulmonary: Clear to auscultation bilaterally  Abdominal: Abdomen soft, nontender, nondistended, positive bowel sounds  Extremities: BLE with no edema or tissue loss.    Pulses:  Carotid pulses are 2+ on the right side, and 2+ on the left side.  Radial pulses are 2+ on the right side, and 2+ on the left side.   Femoral pulses are 2+ on the right side, and 2+ on the left side.  Popliteal pulses are 2+ on the right side, and 2+ on the left side.   Dorsalis pedis pulses are 2+ on the right side, and 2+ on the left side.   Posterior tibial pulses are 2+ on the right side, and 2+ on the left side.    Skin: No ecchymosis, erythema, " or ulcers  Psych: Alert and oriented x 3, appropriate affect  Neuro: CNII-XII intact, no focal deficits    Labs:  Most Recent Data  CBC:   Lab Results   Component Value Date    WBC 4.82 11/14/2023    HGB 9.1 (L) 11/14/2023    HCT 32.1 (L) 11/14/2023     (L) 11/14/2023    MCV 79 (L) 11/14/2023    RDW 15.5 (H) 11/14/2023     BMP:   Lab Results   Component Value Date     (L) 11/14/2023    K 5.3 (H) 11/14/2023     11/14/2023    CO2 21 (L) 11/14/2023    BUN 22 11/14/2023    CREATININE 1.7 (H) 11/14/2023     (H) 11/14/2023    CALCIUM 9.1 11/14/2023    MG 1.7 11/11/2022    PHOS 3.4 11/11/2022     LFTS;   Lab Results   Component Value Date    PROT 6.1 11/14/2023    ALBUMIN 3.3 (L) 11/14/2023    BILITOT 0.3 11/14/2023    AST 9 (L) 11/14/2023    ALKPHOS 52 (L) 11/14/2023    ALT <5 (L) 11/14/2023     COAGS:   Lab Results   Component Value Date    INR 1.1 08/19/2019     FLP:   Lab Results   Component Value Date    CHOL 131 06/07/2023    HDL 31 (L) 06/07/2023    LDLCALC 61.4 (L) 06/07/2023    TRIG 193 (H) 06/07/2023    CHOLHDL 23.7 06/07/2023     CARDIAC:   Lab Results   Component Value Date    TROPONINI 0.090 (H) 07/11/2022    BNP 3,087 (H) 07/10/2022       Imaging:    WENDI Study 2/9/2023:   Right WENDI 0.79  Left WENDI 0.77  Right Leg: Segmental pressures and PVR waveforms suggest multi-level mild to moderate peripheral arterial occlusive disease.   Left Leg: Segmental pressures and PVR waveforms suggest multi-level mild to moderate peripheral arterial occlusive disease.   Findings consistent with aorto-iliac disease.   Rt lower digits: Toe PPG waveforms as described above. - TBI of 0.72 with a Great toe pressure of 139 mmHg is noted.   Lt lower digits: Toe PPG waveforms as described above. - TBI of 0.78 with a Great toe pressure of 149 mmHg is noted.     Echo 7/11/2022:  The left ventricle is normal in size with eccentric hypertrophy and normal systolic function. The estimated ejection fraction is  65%.  Normal right ventricular size with normal right ventricular systolic function.  Left ventricular diastolic dysfunction.  Biatrial enlargement.  Mild aortic regurgitation.  PA systolic pressure is at elast 39 mmHg. The IVC is not visualized.    Nuclear Stress Testing 5/15/2023    Normal myocardial perfusion scan. There is no evidence of myocardial ischemia or infarction.    The visually estimated ejection fraction is normal at rest and normal during stress.    There is normal wall motion at rest and post stress.    LV cavity size is normal at rest and normal at stress.    The ECG portion of the study is abnormal but not diagnostic due to resting ST-T abnormalities.    The patient reported no chest pain during the stress test.    There were no arrhythmias during stress.    There are no prior studies for comparison.    Assessment/Plan:  Ravi Zamorano is a 68 y.o. male with pAF (on Eliquis), HTN, HLD, DM2, hx of ESRD previously on HD status post  donor kidney transplant, smoking, who presetns for an initial appointment.    PAD- Mr. Zamorano presents with [place] stage 3 claudication symptoms.  No rest pain or tissue loss.  Will hold off of starting cilostazol due to potential interaction with flecainide for QT interval prolongation.  Continue ASA 81 mg daily, Eliqus 5 mg bid, and atorvastatin 40 mg daily.  Pt to start walking program with goal of at least 30 minutes a day/5 days a week. Check BLE arterial ultrasound prior to next visit.     2. Paroxysmal Afib- Check 30 day event monitor to quantify Afib burden.  Continue Eliquis 5 mg bid for anticoagulation.    3. HLD- Continue ASA 81 mg daily, Eliqus 5 mg bid, and atorvastatin 40 mg daily.    4. Overweight- Encourage diet, exercise, and weight loss.     5. Smoking- Encourage smoking cessation.     Follow up in 3 months    Total duration of face to face visit time 30 minutes.  Total time spent counseling greater than fifty percent of total visit  time.  Counseling included discussion regarding imaging findings, diagnosis, possibilities, treatment options, risks and benefits.  The patient had many questions regarding the options and long-term effects.    Darwin Gottlieb MD, PhD  Interventional Cardiology

## 2023-12-19 NOTE — PATIENT INSTRUCTIONS
Assessment/Plan:  Ravi Zamorano is a 68 y.o. male with pAF (on Eliquis), HTN, HLD, DM2, hx of ESRD previously on HD status post  donor kidney transplant, smoking, who presetns for an initial appointment.    PAD- Mr. Zamorano presents with [place] stage 3 claudication symptoms.  No rest pain or tissue loss.  Will hold off of starting cilostazol due to potential interaction with flecainide for QT interval prolongation.  Continue ASA 81 mg daily, Eliqus 5 mg bid, and atorvastatin 40 mg daily.  Pt to start walking program with goal of at least 30 minutes a day/5 days a week. Check BLE arterial ultrasound prior to next visit.     2. Paroxysmal Afib- Check 30 day event monitor to quantify Afib burden.  Continue Eliquis 5 mg bid for anticoagulation.    3. HLD- Continue ASA 81 mg daily, Eliqus 5 mg bid, and atorvastatin 40 mg daily.    4. Overweight- Encourage diet, exercise, and weight loss.     5. Smoking- Encourage smoking cessation.     Follow up in 3 months

## 2023-12-20 ENCOUNTER — CLINICAL SUPPORT (OUTPATIENT)
Dept: CARDIOLOGY | Facility: HOSPITAL | Age: 68
End: 2023-12-20
Attending: INTERNAL MEDICINE
Payer: MEDICARE

## 2023-12-20 DIAGNOSIS — I48.0 PAROXYSMAL A-FIB: ICD-10-CM

## 2023-12-20 PROCEDURE — 93271 ECG/MONITORING AND ANALYSIS: CPT

## 2023-12-20 PROCEDURE — 93270 REMOTE 30 DAY ECG REV/REPORT: CPT

## 2023-12-21 ENCOUNTER — OFFICE VISIT (OUTPATIENT)
Dept: NEPHROLOGY | Facility: CLINIC | Age: 68
End: 2023-12-21
Payer: MEDICARE

## 2023-12-21 VITALS
SYSTOLIC BLOOD PRESSURE: 188 MMHG | OXYGEN SATURATION: 98 % | HEART RATE: 86 BPM | WEIGHT: 200 LBS | BODY MASS INDEX: 26.39 KG/M2 | DIASTOLIC BLOOD PRESSURE: 81 MMHG

## 2023-12-21 DIAGNOSIS — N18.32 STAGE 3B CHRONIC KIDNEY DISEASE: Primary | ICD-10-CM

## 2023-12-21 DIAGNOSIS — E11.3393 TYPE 2 DIABETES MELLITUS WITH BOTH EYES AFFECTED BY MODERATE NONPROLIFERATIVE RETINOPATHY WITHOUT MACULAR EDEMA, WITHOUT LONG-TERM CURRENT USE OF INSULIN: ICD-10-CM

## 2023-12-21 DIAGNOSIS — R80.9 PROTEINURIA, UNSPECIFIED TYPE: ICD-10-CM

## 2023-12-21 DIAGNOSIS — I10 HYPERTENSION, UNSPECIFIED TYPE: ICD-10-CM

## 2023-12-21 DIAGNOSIS — D63.8 ANEMIA OF CHRONIC DISEASE: ICD-10-CM

## 2023-12-21 DIAGNOSIS — Z94.0 S/P KIDNEY TRANSPLANT: ICD-10-CM

## 2023-12-21 DIAGNOSIS — E87.5 HYPERKALEMIA: ICD-10-CM

## 2023-12-21 PROCEDURE — 99999 PR PBB SHADOW E&M-EST. PATIENT-LVL IV: ICD-10-PCS | Mod: PBBFAC,GC,, | Performed by: STUDENT IN AN ORGANIZED HEALTH CARE EDUCATION/TRAINING PROGRAM

## 2023-12-21 PROCEDURE — 99214 PR OFFICE/OUTPT VISIT, EST, LEVL IV, 30-39 MIN: ICD-10-PCS | Mod: GC,S$GLB,, | Performed by: STUDENT IN AN ORGANIZED HEALTH CARE EDUCATION/TRAINING PROGRAM

## 2023-12-21 PROCEDURE — 1159F PR MEDICATION LIST DOCUMENTED IN MEDICAL RECORD: ICD-10-PCS | Mod: CPTII,GC,S$GLB, | Performed by: STUDENT IN AN ORGANIZED HEALTH CARE EDUCATION/TRAINING PROGRAM

## 2023-12-21 PROCEDURE — 3062F PR POS MACROALBUMINURIA RESULT DOCUMENTED/REVIEW: ICD-10-PCS | Mod: CPTII,GC,S$GLB, | Performed by: STUDENT IN AN ORGANIZED HEALTH CARE EDUCATION/TRAINING PROGRAM

## 2023-12-21 PROCEDURE — 3052F PR MOST RECENT HEMOGLOBIN A1C LEVEL 8.0 - < 9.0%: ICD-10-PCS | Mod: CPTII,GC,S$GLB, | Performed by: STUDENT IN AN ORGANIZED HEALTH CARE EDUCATION/TRAINING PROGRAM

## 2023-12-21 PROCEDURE — 3008F PR BODY MASS INDEX (BMI) DOCUMENTED: ICD-10-PCS | Mod: CPTII,GC,S$GLB, | Performed by: STUDENT IN AN ORGANIZED HEALTH CARE EDUCATION/TRAINING PROGRAM

## 2023-12-21 PROCEDURE — 3288F PR FALLS RISK ASSESSMENT DOCUMENTED: ICD-10-PCS | Mod: CPTII,GC,S$GLB, | Performed by: STUDENT IN AN ORGANIZED HEALTH CARE EDUCATION/TRAINING PROGRAM

## 2023-12-21 PROCEDURE — 3072F LOW RISK FOR RETINOPATHY: CPT | Mod: CPTII,GC,S$GLB, | Performed by: STUDENT IN AN ORGANIZED HEALTH CARE EDUCATION/TRAINING PROGRAM

## 2023-12-21 PROCEDURE — 1126F AMNT PAIN NOTED NONE PRSNT: CPT | Mod: CPTII,GC,S$GLB, | Performed by: STUDENT IN AN ORGANIZED HEALTH CARE EDUCATION/TRAINING PROGRAM

## 2023-12-21 PROCEDURE — 3052F HG A1C>EQUAL 8.0%<EQUAL 9.0%: CPT | Mod: CPTII,GC,S$GLB, | Performed by: STUDENT IN AN ORGANIZED HEALTH CARE EDUCATION/TRAINING PROGRAM

## 2023-12-21 PROCEDURE — 4010F ACE/ARB THERAPY RXD/TAKEN: CPT | Mod: CPTII,GC,S$GLB, | Performed by: STUDENT IN AN ORGANIZED HEALTH CARE EDUCATION/TRAINING PROGRAM

## 2023-12-21 PROCEDURE — 1160F PR REVIEW ALL MEDS BY PRESCRIBER/CLIN PHARMACIST DOCUMENTED: ICD-10-PCS | Mod: CPTII,GC,S$GLB, | Performed by: STUDENT IN AN ORGANIZED HEALTH CARE EDUCATION/TRAINING PROGRAM

## 2023-12-21 PROCEDURE — 1159F MED LIST DOCD IN RCRD: CPT | Mod: CPTII,GC,S$GLB, | Performed by: STUDENT IN AN ORGANIZED HEALTH CARE EDUCATION/TRAINING PROGRAM

## 2023-12-21 PROCEDURE — 3288F FALL RISK ASSESSMENT DOCD: CPT | Mod: CPTII,GC,S$GLB, | Performed by: STUDENT IN AN ORGANIZED HEALTH CARE EDUCATION/TRAINING PROGRAM

## 2023-12-21 PROCEDURE — 1126F PR PAIN SEVERITY QUANTIFIED, NO PAIN PRESENT: ICD-10-PCS | Mod: CPTII,GC,S$GLB, | Performed by: STUDENT IN AN ORGANIZED HEALTH CARE EDUCATION/TRAINING PROGRAM

## 2023-12-21 PROCEDURE — 99214 OFFICE O/P EST MOD 30 MIN: CPT | Mod: GC,S$GLB,, | Performed by: STUDENT IN AN ORGANIZED HEALTH CARE EDUCATION/TRAINING PROGRAM

## 2023-12-21 PROCEDURE — 99999 PR PBB SHADOW E&M-EST. PATIENT-LVL IV: CPT | Mod: PBBFAC,GC,, | Performed by: STUDENT IN AN ORGANIZED HEALTH CARE EDUCATION/TRAINING PROGRAM

## 2023-12-21 PROCEDURE — 3079F PR MOST RECENT DIASTOLIC BLOOD PRESSURE 80-89 MM HG: ICD-10-PCS | Mod: CPTII,GC,S$GLB, | Performed by: STUDENT IN AN ORGANIZED HEALTH CARE EDUCATION/TRAINING PROGRAM

## 2023-12-21 PROCEDURE — 1101F PT FALLS ASSESS-DOCD LE1/YR: CPT | Mod: CPTII,GC,S$GLB, | Performed by: STUDENT IN AN ORGANIZED HEALTH CARE EDUCATION/TRAINING PROGRAM

## 2023-12-21 PROCEDURE — 4010F PR ACE/ARB THEARPY RXD/TAKEN: ICD-10-PCS | Mod: CPTII,GC,S$GLB, | Performed by: STUDENT IN AN ORGANIZED HEALTH CARE EDUCATION/TRAINING PROGRAM

## 2023-12-21 PROCEDURE — 3066F PR DOCUMENTATION OF TREATMENT FOR NEPHROPATHY: ICD-10-PCS | Mod: CPTII,GC,S$GLB, | Performed by: STUDENT IN AN ORGANIZED HEALTH CARE EDUCATION/TRAINING PROGRAM

## 2023-12-21 PROCEDURE — 3066F NEPHROPATHY DOC TX: CPT | Mod: CPTII,GC,S$GLB, | Performed by: STUDENT IN AN ORGANIZED HEALTH CARE EDUCATION/TRAINING PROGRAM

## 2023-12-21 PROCEDURE — 3008F BODY MASS INDEX DOCD: CPT | Mod: CPTII,GC,S$GLB, | Performed by: STUDENT IN AN ORGANIZED HEALTH CARE EDUCATION/TRAINING PROGRAM

## 2023-12-21 PROCEDURE — 1101F PR PT FALLS ASSESS DOC 0-1 FALLS W/OUT INJ PAST YR: ICD-10-PCS | Mod: CPTII,GC,S$GLB, | Performed by: STUDENT IN AN ORGANIZED HEALTH CARE EDUCATION/TRAINING PROGRAM

## 2023-12-21 PROCEDURE — 3079F DIAST BP 80-89 MM HG: CPT | Mod: CPTII,GC,S$GLB, | Performed by: STUDENT IN AN ORGANIZED HEALTH CARE EDUCATION/TRAINING PROGRAM

## 2023-12-21 PROCEDURE — 3077F SYST BP >= 140 MM HG: CPT | Mod: CPTII,GC,S$GLB, | Performed by: STUDENT IN AN ORGANIZED HEALTH CARE EDUCATION/TRAINING PROGRAM

## 2023-12-21 PROCEDURE — 1160F RVW MEDS BY RX/DR IN RCRD: CPT | Mod: CPTII,GC,S$GLB, | Performed by: STUDENT IN AN ORGANIZED HEALTH CARE EDUCATION/TRAINING PROGRAM

## 2023-12-21 PROCEDURE — 3077F PR MOST RECENT SYSTOLIC BLOOD PRESSURE >= 140 MM HG: ICD-10-PCS | Mod: CPTII,GC,S$GLB, | Performed by: STUDENT IN AN ORGANIZED HEALTH CARE EDUCATION/TRAINING PROGRAM

## 2023-12-21 PROCEDURE — 3062F POS MACROALBUMINURIA REV: CPT | Mod: CPTII,GC,S$GLB, | Performed by: STUDENT IN AN ORGANIZED HEALTH CARE EDUCATION/TRAINING PROGRAM

## 2023-12-21 PROCEDURE — 3072F PR LOW RISK FOR RETINOPATHY: ICD-10-PCS | Mod: CPTII,GC,S$GLB, | Performed by: STUDENT IN AN ORGANIZED HEALTH CARE EDUCATION/TRAINING PROGRAM

## 2023-12-21 RX ORDER — HYDROCHLOROTHIAZIDE 25 MG/1
25 TABLET ORAL DAILY
Qty: 30 TABLET | Refills: 11 | Status: ON HOLD | OUTPATIENT
Start: 2023-12-21 | End: 2024-04-03 | Stop reason: HOSPADM

## 2023-12-21 NOTE — PROGRESS NOTES
Subjective     Chief Complaint: CKD 3    History of Present Illness:  Mr. Ravi Zamorano is a 68 y.o. male with known ESRD 2/2 DM & HTN and who was previously on HD and then obtained a DBD kidney transplant (), post-kidney transplant CKD 3b, Hepatitis C (treated ), afib s/p cardioversion (), chronic diastolic heart failure, diabetic neuropathy & retinopathy, BPH, erectile dysfunction, and anxiety/depression.             Kidney transplant ()  Donor Type:   - Brain Death  Donor CMV Status:  Positive  Donor HBcAB:  Negative  Donor HCV Status:  Positive  Transplant nephrologist: Dr. Sosa   Transplant medications: Tacrolimus 0.5mg twice daily, Prednisone 5mg daily, and Mycophenolate 250mg 4 caps twice daily   Last Tacrolimus level was 4.5 on 2022 at 0800         Office Visit 23-- At this time the patient reports symptoms of claudication, but no other concerns. Labs in November reveal creatinine stable at 1.7, potassium elevated at 5.3, A1C of 8.4. Recent tacrolimus level of 7.8. He had a recent UPCR which revealed nearly 3g of proteinuria. Patient measures blood pressure at home and reports that his home blood pressure tends to be in the 150s systolic, and admits that seeing a persistently elevated blood pressure has demotivated him to the point that he checks it infrequently. He currently does not exercise secondary to claudication in his left leg, is not adhering to a low salt diet, and is not watching what he eats. I have confirmed with the patient his current cardiovascular medications and he confirms that he is taking; eliquis 5mg daily, asa 81mg daily, atorvastatin 40mg daily, carvedilol 25mg bid, spironolactone 25mg daily, valsartan 320mg daily. He denies any missed doses. His A1C is also elevated above 8.0 on most recent lab check, he will note that his sugars range from 70s-250s. He admits to eating frequent carbs during the day, and has toast every breakfast and will  frequently have rice during the day. He continues to have severe neuropathy and is unable to reduce his dose of gabapentin 300 mg TID. He does report compliance with his Jardiance and insulin. He was taken off Ozempic due to unintended weight loss and started on Jardiance.     Review of Systems   Constitutional:  Negative for chills, diaphoresis, fever and weight loss.   Respiratory:  Negative for cough, shortness of breath and wheezing.    Cardiovascular:  Positive for claudication. Negative for chest pain, palpitations, orthopnea, leg swelling and PND.   Gastrointestinal:  Negative for abdominal pain, constipation, diarrhea, heartburn, nausea and vomiting.   Genitourinary:  Negative for dysuria and urgency.        Foamy urine   Musculoskeletal:  Negative for back pain, falls and neck pain.   Skin:  Negative for itching and rash.   Neurological:  Negative for dizziness, tremors, focal weakness, loss of consciousness, weakness and headaches.   Endo/Heme/Allergies:  Negative for polydipsia.       PAST HISTORY:     Past Medical History:   Diagnosis Date    Anxiety 7/29/2014    Arthritis     Bilateral diabetic retinopathy 2017    CKD (chronic kidney disease) stage 2, GFR 60-89 ml/min 12/28/2016    CKD (chronic kidney disease) stage 4, GFR 15-29 ml/min 7/29/2014    Colon polyps 2014    Depression - situational 7/29/2014    Diabetes mellitus     Diabetes type 2 since 2000 7/29/2014    Diabetic neuropathy 7/29/2014    History of cardioversion 10/3/2019    History of hepatitis C, s/p successful Rx w/ SVR24 - 9/2017 7/29/2014    Genotype 1a, treatment naive 10/2014 liver biopsy - grade 1 / stage 1 Completed Harvoni w/ SVR    Hyperlipidemia 7/29/2014    Hypertension     Neuropathy     Organ transplant candidate 7/29/2014    Pancreatitis     S/P cadaveric kidney transplant 11/5/2016. ESRD secondary to HTN/DMII 11/5/2016    Stage 3a chronic kidney disease 12/28/2016    Type 2 diabetes mellitus with stage 3a chronic kidney  disease, with long-term current use of insulin 2014       Past Surgical History:   Procedure Laterality Date    APPENDECTOMY      COLONOSCOPY N/A 2020    Procedure: COLONOSCOPY;  Surgeon: Tico Bell MD;  Location: [facility] ENDO ([address] [place] [address]);  Service: Endoscopy;  Laterality: N/A;  ok to hold eliquis per Dr. Valadez, see telephone encounter 2020-MS  covid test  Fif[facility]    KIDNEY TRANSPLANT      TRANSESOPHAGEAL ECHOCARDIOGRAPHY N/A 2019    Procedure: ECHOCARDIOGRAM, TRANSESOPHAGEAL;  Surgeon: Dosc Diagnostic Provider;  Location: [facility] EP LAB;  Service: Cardiology;  Laterality: N/A;    TREATMENT OF CARDIAC ARRHYTHMIA N/A 2019    Procedure: CARDIOVERSION;  Surgeon: Bronson Bowden MD;  Location: [facility] EP LAB;  Service: Cardiology;  Laterality: N/A;  AF, FELICIANO, DCCV, MAC, GP, 3 PREP       Family History   Problem Relation Age of Onset    Diabetes Mother     Hypertension Mother     Heart disease Mother         CAD with PCI    Heart disease Father     Cancer Brother         one sister with breast cancer    Hypertension Brother         one sister with HTN and borderline DM    Stroke Neg Hx     Kidney disease Neg Hx        Social History     Socioeconomic History    Marital status:    Occupational History     Employer: [employer]   Tobacco Use    Smoking status: Former     Current packs/day: 0.00     Average packs/day: 0.5 packs/day for 32.0 years (16.0 ttl pk-yrs)     Types: Cigarettes     Start date: 1984     Quit date: 2016     Years since quittin.0    Smokeless tobacco: Never    Tobacco comments:     Pt reports that he quit in , but started up again in . pt reports he is currently working on quitting again   Substance and Sexual Activity    Alcohol use: Yes     Comment: Pt reports occasional beers on Sundays. Pt reports drinking daily prior to ESRD diagnosis.    Drug use: No    Sexual activity: Yes     Partners: Female   Social History Narrative      for 14 years     for 29 years    2 children ages 35, 36     Social Determinants of Health     Financial Resource Strain: Low Risk  (10/10/2023)    Overall Financial Resource Strain (CARDIA)     Difficulty of Paying Living Expenses: Not hard at all   Food Insecurity: No Food Insecurity (10/10/2023)    Hunger Vital Sign     Worried About Running Out of Food in the Last Year: Never true     Ran Out of Food in the Last Year: Never true   Transportation Needs: No Transportation Needs (10/10/2023)    PRAPARE - Transportation     Lack of Transportation (Medical): No     Lack of Transportation (Non-Medical): No   Physical Activity: Inactive (10/10/2023)    Exercise Vital Sign     Days of Exercise per Week: 0 days     Minutes of Exercise per Session: 0 min   Stress: No Stress Concern Present (10/10/2023)    [employer] [place] [employer] - Occupational Stress Questionnaire     Feeling of Stress : Not at all   Social Connections: Moderately Isolated (10/10/2023)    Social Connection and Isolation Panel [NHANES]     Frequency of Communication with Friends and Family: More than three times a week     Frequency of Social Gatherings with Friends and Family: Once a week     Attends Denominational Services: Never     Active Member of Clubs or Organizations: No     Attends Club or Organization Meetings: Never     Marital Status:    Housing Stability: Unknown (10/10/2023)    Housing Stability Vital Sign     Unable to Pay for Housing in the Last Year: No     Unstable Housing in the Last Year: No       MEDICATIONS & ALLERGIES:     Current Outpatient Medications on File Prior to Visit   Medication Sig    apixaban (ELIQUIS) 5 mg Tab Take 1 tablet (5 mg total) by mouth 2 (two) times daily.    aspirin 81 MG Chew Take 81 mg by mouth once daily.    atorvastatin (LIPITOR) 40 MG tablet Take 1 tablet (40 mg total) by mouth once daily.    blood-glucose sensor Kayla Gin 3, use as directed, change every 14 days , e  "11.65    carvediloL (COREG) 25 MG tablet Take 1 tablet (25 mg total) by mouth 2 (two) times daily.    empagliflozin (JARDIANCE) 10 mg tablet Take 1 tablet (10 mg total) by mouth once daily.    famotidine (PEPCID) 20 MG tablet Take 1 tablet (20 mg total) by mouth every evening.    flecainide (TAMBOCOR) 50 MG Tab Take 1 tablet (50 mg total) by mouth 2 (two) times a day.    gabapentin (NEURONTIN) 300 MG capsule Take 1 capsule by mouth in the morning, 1 capsule midday, and 3 capsules at night.    insulin (LANTUS SOLOSTAR U-100 INSULIN) glargine 100 units/mL SubQ pen Inject 16 Units into the skin every morning.    insulin aspart U-100 (NOVOLOG) 100 unit/mL (3 mL) InPn pen Inject 18 units w/ breakfast and lunch, 14 units at dinner scale 180-230+2, 231-280+4, 281-330+6, 331-380+8, >380+10.  *Max daily 80 units.*    meclizine (ANTIVERT) 25 mg tablet Take 1 tablet (25 mg total) by mouth 3 (three) times daily as needed for Dizziness.    melatonin 5 mg Tab Take 1 tablet (5 mg total) by mouth every evening. (Patient taking differently: Take 1 tablet by mouth as needed.)    mycophenolate (CELLCEPT) 250 mg Cap Take 4 capsules (1,000 mg total) by mouth 2 (two) times daily.    pen needle, diabetic (BD ULTRA-FINE LO PEN NEEDLE) 32 gauge x 5/32" Ndle USE TO ADMINISTER INSULIN 4 TIMES DAILY.    pen needle, diabetic (BD ULTRA-FINE LO PEN NEEDLE) 32 gauge x 5/32" Ndle use four times a day    predniSONE (DELTASONE) 5 MG tablet Take 1 tablet (5 mg total) by mouth once daily.    spironolactone (ALDACTONE) 25 MG tablet Take 1 tablet (25 mg total) by mouth once daily.    tacrolimus (PROGRAF) 0.5 MG Cap Take 1 capsule (0.5 mg total) by mouth every 12 (twelve) hours.    valsartan (DIOVAN) 320 MG tablet Take 1 tablet (320 mg total) by mouth once daily.    [DISCONTINUED] insulin lispro (HUMALOG KWIKPEN INSULIN) 100 unit/mL pen Inject 18 units w/ breakfast, 16 units w/ lunch and dinner plus scale 150-200 +2, 201-250 +4, 251-300 +6, 301-350 " +8.    [DISCONTINUED] lancets (ONETOUCH DELICA PLUS LANCET) 33 gauge Misc USE TO CHECK BLOOD GLUCOSE FOUR TIMES DAILY     No current facility-administered medications on file prior to visit.       Review of patient's allergies indicates:   Allergen Reactions    Nifedipine Other (See Comments)     Gingival hyperplasia       OBJECTIVE:     Vital Signs:  Vitals:    12/21/23 1445   BP: (!) 188/81   Pulse: 86   SpO2: 98%   Weight: 90.7 kg (200 lb)       Body mass index is 26.39 kg/m².     Physical Exam  Constitutional:       General: He is not in acute distress.     Appearance: Normal appearance. He is not ill-appearing or toxic-appearing.   HENT:      Head: Atraumatic.      Right Ear: External ear normal.      Left Ear: External ear normal.      Nose: Nose normal.      Mouth/Throat:      Mouth: Mucous membranes are moist.   Eyes:      Extraocular Movements: Extraocular movements intact.   Cardiovascular:      Rate and Rhythm: Normal rate and regular rhythm.      Pulses: Normal pulses.      Heart sounds: Normal heart sounds. No murmur heard.  Pulmonary:      Effort: Pulmonary effort is normal. No respiratory distress.      Breath sounds: Normal breath sounds. No wheezing or rales.   Chest:      Chest wall: No tenderness.   Abdominal:      General: Abdomen is flat.      Palpations: Abdomen is soft.   Musculoskeletal:         General: No swelling. Normal range of motion.      Cervical back: Normal range of motion.      Right lower leg: No edema.      Left lower leg: No edema.   Skin:     General: Skin is warm.      Capillary Refill: Capillary refill takes less than 2 seconds.      Coloration: Skin is not jaundiced.      Findings: No lesion or rash.   Neurological:      General: No focal deficit present.      Mental Status: He is alert and oriented to person, place, and time. Mental status is at baseline.      Cranial Nerves: No cranial nerve deficit.      Motor: No weakness.   Psychiatric:         Mood and Affect: Mood  normal.         Behavior: Behavior normal.         Laboratory  No results found for this or any previous visit (from the past 24 hour(s)).    Diagnostic Results:      Health Maintenance Due   Topic Date Due    RSV Vaccine (Age 60+ and Pregnant patients) (1 - 1-dose 60+ series) Never done    Foot Exam  12/02/2021    Abdominal Aortic Aneurysm Screening  11/08/2022    Eye Exam  04/07/2023         ASSESSMENT & PLAN:   Mr. Ravi Zamorano is a 68 y.o. male post renal transplant in 2016 who presents to the office for follow up of CKD. He has had stable renal function over the past 12 months, with his creatinine remaining at 1.7. UPCR is now showing increased proteinuria and patient reports foamy urine. He denies missing any immunosuppressive medications, but it does appear that he has persistently elevated hypertension as well as uncontrolled diabetes. In addition, labs also show a decreased in hemoglobin. He stopped his iron supplements one year ago due to dark stools, at this time he denies bloody or dark stools. Last colonoscopy was in 2020 and was without acute findings.     I have advised the patient to increase his daily exercise as well as try to lose some weight. I have also advised that he adopt a low salt (less than 2g/day) as well as low protein diet. I offered him our CKD dietary education class, which he is not interested in attending at this time. I have explained to him that his uncontrolled diabetes as well as hypertension may be causing his to spill protein into his urine which could lead to decline of his transplanted kidney function. I also provided him a resource for low potassium foods that he can read at home.     Due to his persistently elevated hypertension, I have added HCTZ 25 mg. I have advised that he check his blood pressures daily and to alert me if his blood pressure is elevated above 130/80 mmHg. I offered him digital blood pressure monitoring, which he is not interested in at this time  and would prefer to use his own machine. Please bring in your home blood pressure machine during your next visit for comparison to our readings.     I have asked the patient to follow up with his endocrinologist for diabetes management.    Stage 3b chronic kidney disease  -     PTH, intact; Future; Expected date: 12/21/2023  -     Vitamin D; Future; Expected date: 12/21/2023  -     US Transplant Kidney With Doppler; Future; Expected date: 12/21/2023  -     Renal Function Panel; Future; Expected date: 03/21/2024    S/P cadaveric kidney transplant 11/5/2016. ESRD secondary to HTN/DMII    Type 2 diabetes mellitus with both eyes affected by moderate nonproliferative retinopathy without macular edema, without long-term current use of insulin  -     Microalbumin/creatinine urine ratio; Future; Expected date: 12/21/2023    Hyperkalemia  -     Potassium; Future; Expected date: 12/21/2023    Anemia of chronic disease  -     Iron and TIBC; Future; Expected date: 12/21/2023  -     Ferritin; Future; Expected date: 12/21/2023  -     TRANSFERRIN; Future; Expected date: 12/21/2023  -     CBC Auto Differential; Future; Expected date: 03/21/2024    Hypertension, unspecified type  -     hydroCHLOROthiazide (HYDRODIURIL) 25 MG tablet; Take 1 tablet (25 mg total) by mouth once daily.  Dispense: 30 tablet; Refill: 11    Proteinuria, unspecified type  -     ANTI-DNA ANTIBODY, DOUBLE-STRANDED; Future; Expected date: 12/21/2023  -     Protein electrophoresis, serum; Future; Expected date: 12/21/2023  -     IMMUNOFIXATION ELECTROPHORESIS, SERUM; Future; Expected date: 12/21/2023  -     Immunoglobulin free LT chains blood; Future; Expected date: 12/21/2023        RTC in 3 months    Discussed with Dr. Amin  - staff attestation to follow      Francesco Lopez MD  Nephrology PGY-4    [address] [place] [address]  [phone]

## 2023-12-21 NOTE — PATIENT INSTRUCTIONS
You should follow a diet low in potassium.  See the list on this website for more details:  [employer] Website:  https://www.kidney.org/atoz/content/potassium

## 2024-01-04 DIAGNOSIS — I15.0 RENOVASCULAR HYPERTENSION: ICD-10-CM

## 2024-01-04 DIAGNOSIS — I50.32 CHRONIC DIASTOLIC CONGESTIVE HEART FAILURE: ICD-10-CM

## 2024-01-04 DIAGNOSIS — E78.5 HYPERLIPIDEMIA, UNSPECIFIED HYPERLIPIDEMIA TYPE: ICD-10-CM

## 2024-01-04 RX ORDER — FLECAINIDE ACETATE 50 MG/1
50 TABLET ORAL 2 TIMES DAILY
Qty: 180 TABLET | Refills: 0 | Status: SHIPPED | OUTPATIENT
Start: 2024-01-04

## 2024-01-04 NOTE — TELEPHONE ENCOUNTER
No care due was identified.  [facility] Embedded Care Due Messages. Reference number: [id number].   1/04/2024 1:03:19 PM CST

## 2024-01-04 NOTE — TELEPHONE ENCOUNTER
Refill Routing Note   Medication(s) are not appropriate for processing by [facility] for the following reason(s):        Outside of protocol    [facility] action(s):  Route             Appointments  past 12m or future 3m with PCP    Date Provider   Last Visit   10/27/2022 Mima Mack MD   Next Visit   2/1/2024 Mima Mack MD   ED visits in past 90 days: 0        Note composed:1:28 PM 01/04/2024

## 2024-01-05 RX ORDER — ATORVASTATIN CALCIUM 40 MG/1
40 TABLET, FILM COATED ORAL DAILY
Qty: 90 TABLET | Refills: 0 | Status: SHIPPED | OUTPATIENT
Start: 2024-01-05

## 2024-01-05 RX ORDER — SPIRONOLACTONE 25 MG/1
25 TABLET ORAL DAILY
Qty: 90 TABLET | Refills: 1 | Status: SHIPPED | OUTPATIENT
Start: 2024-01-05

## 2024-01-05 NOTE — TELEPHONE ENCOUNTER
No care due was identified.  [facility] Embedded Care Due Messages. Reference number: [id number].   1/05/2024 12:04:24 AM CST

## 2024-01-05 NOTE — TELEPHONE ENCOUNTER
Refill Decision Note   Ravi Jaun  is requesting a refill authorization.  Brief Assessment and Rationale for Refill:  Approve     Medication Therapy Plan:         Comments:     Note composed:2:26 AM 01/05/2024

## 2024-01-05 NOTE — TELEPHONE ENCOUNTER
Refill Routing Note   Medication(s) are not appropriate for processing by [facility] for the following reason(s):        Required vitals abnormal  Required labs abnormal    [facility] action(s):  Defer               Appointments  past 12m or future 3m with PCP    Date Provider   Last Visit   10/27/2022 Mima Mack MD   Next Visit   1/4/2024 Mima Mack MD   ED visits in past 90 days: 0        Note composed:12:04 AM 01/05/2024

## 2024-01-22 DIAGNOSIS — E11.22 TYPE 2 DIABETES MELLITUS WITH STAGE 3A CHRONIC KIDNEY DISEASE, WITH LONG-TERM CURRENT USE OF INSULIN: ICD-10-CM

## 2024-01-22 DIAGNOSIS — Z79.4 TYPE 2 DIABETES MELLITUS WITH STAGE 3A CHRONIC KIDNEY DISEASE, WITH LONG-TERM CURRENT USE OF INSULIN: ICD-10-CM

## 2024-01-22 DIAGNOSIS — N18.31 TYPE 2 DIABETES MELLITUS WITH STAGE 3A CHRONIC KIDNEY DISEASE, WITH LONG-TERM CURRENT USE OF INSULIN: ICD-10-CM

## 2024-01-23 ENCOUNTER — OFFICE VISIT (OUTPATIENT)
Dept: OPTOMETRY | Facility: CLINIC | Age: 69
End: 2024-01-23
Payer: COMMERCIAL

## 2024-01-23 ENCOUNTER — TELEPHONE (OUTPATIENT)
Dept: OPHTHALMOLOGY | Facility: CLINIC | Age: 69
End: 2024-01-23
Payer: MEDICARE

## 2024-01-23 DIAGNOSIS — H52.223 HYPEROPIA WITH REGULAR ASTIGMATISM AND PRESBYOPIA, BILATERAL: Primary | ICD-10-CM

## 2024-01-23 DIAGNOSIS — E11.3393 TYPE 2 DIABETES MELLITUS WITH BOTH EYES AFFECTED BY MODERATE NONPROLIFERATIVE RETINOPATHY WITHOUT MACULAR EDEMA, WITHOUT LONG-TERM CURRENT USE OF INSULIN: ICD-10-CM

## 2024-01-23 DIAGNOSIS — H26.9 CORTICAL CATARACT OF BOTH EYES: ICD-10-CM

## 2024-01-23 DIAGNOSIS — E11.22 TYPE 2 DIABETES MELLITUS WITH STAGE 3A CHRONIC KIDNEY DISEASE, WITH LONG-TERM CURRENT USE OF INSULIN: ICD-10-CM

## 2024-01-23 DIAGNOSIS — H04.123 DRY EYE SYNDROME, BILATERAL: ICD-10-CM

## 2024-01-23 DIAGNOSIS — Z79.4 TYPE 2 DIABETES MELLITUS WITH STAGE 3A CHRONIC KIDNEY DISEASE, WITH LONG-TERM CURRENT USE OF INSULIN: ICD-10-CM

## 2024-01-23 DIAGNOSIS — H52.03 HYPEROPIA WITH REGULAR ASTIGMATISM AND PRESBYOPIA, BILATERAL: Primary | ICD-10-CM

## 2024-01-23 DIAGNOSIS — H10.13 CONJUNCTIVITIS, ALLERGIC, BILATERAL: ICD-10-CM

## 2024-01-23 DIAGNOSIS — H52.4 HYPEROPIA WITH REGULAR ASTIGMATISM AND PRESBYOPIA, BILATERAL: Primary | ICD-10-CM

## 2024-01-23 DIAGNOSIS — N18.31 TYPE 2 DIABETES MELLITUS WITH STAGE 3A CHRONIC KIDNEY DISEASE, WITH LONG-TERM CURRENT USE OF INSULIN: ICD-10-CM

## 2024-01-23 DIAGNOSIS — H47.393 OPTIC NERVE CUPPING OF BOTH EYES: ICD-10-CM

## 2024-01-23 PROCEDURE — 92015 DETERMINE REFRACTIVE STATE: CPT | Mod: S$GLB,,, | Performed by: OPTOMETRIST

## 2024-01-23 PROCEDURE — 99999 PR PBB SHADOW E&M-EST. PATIENT-LVL IV: CPT | Mod: PBBFAC,,, | Performed by: OPTOMETRIST

## 2024-01-23 PROCEDURE — 92133 CPTRZD OPH DX IMG PST SGM ON: CPT | Mod: S$GLB,,, | Performed by: OPTOMETRIST

## 2024-01-23 PROCEDURE — 92014 COMPRE OPH EXAM EST PT 1/>: CPT | Mod: S$GLB,,, | Performed by: OPTOMETRIST

## 2024-01-23 NOTE — PROGRESS NOTES
ROSAS    REJI: 4/7/2022 - Dr. Radford    CC: Pt is here today for a routine eye exam. Pt states that he noticed a   slight decline with near and distance vision since last exam.    (-)Dryness  (-)Burning  (-)Itchiness  (-)Tearing  (-)Flashes  (+)Floaters   (-)Photophobia  (-)Eye Pain      Diabetic: yes  A1C: 8.4 on 11/14/2023    Contact Lens: no    Eye Meds: Systane PRN   Last edited by Altagracia Doshi MA on 1/23/2024 11:10 AM.            Assessment /Plan     For exam results, see Encounter Report.    Hyperopia with regular astigmatism and presbyopia, bilateral   Continue with current specs    Type 2 diabetes mellitus with stage 3a chronic kidney disease, with long-term current use of insulin  Type 2 diabetes mellitus with both eyes affected by moderate nonproliferative retinopathy without macular edema, without long-term current use of insulin   Mild NPDR with Dot hemes OU   RTC 6mo for DFE/ retinal photos    Conjunctivitis, allergic, bilateral  Dry eye syndrome, bilateral   Continue with systane daily    Optic nerve cupping of both eyes  -   TODAY  Posterior Segment OCT Optic Nerve- Both eyes: WNL OU, borderline thin OU    Cortical cataract of both eyes   Consult for cat eval     Pt having problems with glare at night and decreased vision while watching TV      RTC 6mo for DFE/ retinal photos

## 2024-02-01 ENCOUNTER — HOSPITAL ENCOUNTER (OUTPATIENT)
Dept: CARDIOLOGY | Facility: HOSPITAL | Age: 69
Discharge: HOME | End: 2024-02-01
Attending: INTERNAL MEDICINE
Payer: MEDICARE

## 2024-02-01 DIAGNOSIS — R26.2 WALKING DIFFICULTY DUE TO LOWER LEG: ICD-10-CM

## 2024-02-01 LAB
LEFT ANT TIBIAL SYS PSV: 0 CM/S
LEFT CFA PSV: 150 CM/S
LEFT DORSALIS PEDIS PSV: 47 CM/S
LEFT EXTERNAL ILIAC PSV: 118 CM/S
LEFT PERONEAL SYS PSV: 21 CM/S
LEFT POPLITEAL PSV: 144 CM/S
LEFT POST TIBIAL SYS PSV: 46 CM/S
LEFT PROFUNDA SYS PSV: 145 CM/S
LEFT SUPER FEMORAL DIST SYS PSV: 86 CM/S
LEFT SUPER FEMORAL MID SYS PSV: 0 CM/S
LEFT SUPER FEMORAL OSTIAL SYS PSV: 33 CM/S
LEFT SUPER FEMORAL PROX SYS PSV: 0 CM/S
LEFT TIB/PER TRUNK SYS PSV: 44 CM/S
OHS CV LEFT LOWER EXTREMITY ABI (NO CALC): 0.7
OHS CV RIGHT ABI LOWER EXTREMITY (NO CALC): 0.65
RIGHT ANT TIBIAL SYS PSV: 0 CM/S
RIGHT CFA PSV: 174 CM/S
RIGHT DORSALIS PEDIS PSV: 37 CM/S
RIGHT EXTERNAL ILLIAC PSV: 184 CM/S
RIGHT PERONEAL SYS PSV: 42 CM/S
RIGHT POPLITEAL PSV: 33 CM/S
RIGHT POST TIBIAL SYS PSV: 44 CM/S
RIGHT PROFUNDA SYS PSV: 165 CM/S
RIGHT SUPER FEMORAL DIST SYS PSV: 326 CM/S
RIGHT SUPER FEMORAL MID SYS PSV: 0 CM/S
RIGHT SUPER FEMORAL OSTIAL SYS PSV: 32 CM/S
RIGHT SUPER FEMORAL PROX SYS PSV: 22 CM/S
RIGHT TIB/PER TRUNK SYS PSV: 46 CM/S

## 2024-02-01 PROCEDURE — 93925 LOWER EXTREMITY STUDY: CPT

## 2024-02-01 PROCEDURE — 93925 LOWER EXTREMITY STUDY: CPT | Mod: 26,,, | Performed by: INTERNAL MEDICINE

## 2024-02-09 ENCOUNTER — TELEPHONE (OUTPATIENT)
Dept: OPHTHALMOLOGY | Facility: CLINIC | Age: 69
End: 2024-02-09
Payer: MEDICARE

## 2024-02-09 ENCOUNTER — OFFICE VISIT (OUTPATIENT)
Dept: OPHTHALMOLOGY | Facility: CLINIC | Age: 69
End: 2024-02-09
Payer: MEDICARE

## 2024-02-09 DIAGNOSIS — H25.11 NUCLEAR SCLEROTIC CATARACT OF RIGHT EYE: Primary | ICD-10-CM

## 2024-02-09 DIAGNOSIS — H25.12 NUCLEAR SCLEROTIC CATARACT OF LEFT EYE: ICD-10-CM

## 2024-02-09 PROCEDURE — 1159F MED LIST DOCD IN RCRD: CPT | Mod: CPTII,S$GLB,, | Performed by: OPHTHALMOLOGY

## 2024-02-09 PROCEDURE — 2022F DILAT RTA XM EVC RTNOPTHY: CPT | Mod: CPTII,S$GLB,, | Performed by: OPHTHALMOLOGY

## 2024-02-09 PROCEDURE — 99999 PR PBB SHADOW E&M-EST. PATIENT-LVL III: CPT | Mod: PBBFAC,,, | Performed by: OPHTHALMOLOGY

## 2024-02-09 PROCEDURE — 92136 OPHTHALMIC BIOMETRY: CPT | Mod: RT,S$GLB,, | Performed by: OPHTHALMOLOGY

## 2024-02-09 PROCEDURE — 1126F AMNT PAIN NOTED NONE PRSNT: CPT | Mod: CPTII,S$GLB,, | Performed by: OPHTHALMOLOGY

## 2024-02-09 PROCEDURE — 3288F FALL RISK ASSESSMENT DOCD: CPT | Mod: CPTII,S$GLB,, | Performed by: OPHTHALMOLOGY

## 2024-02-09 PROCEDURE — 99205 OFFICE O/P NEW HI 60 MIN: CPT | Mod: S$GLB,,, | Performed by: OPHTHALMOLOGY

## 2024-02-09 PROCEDURE — 1101F PT FALLS ASSESS-DOCD LE1/YR: CPT | Mod: CPTII,S$GLB,, | Performed by: OPHTHALMOLOGY

## 2024-02-09 RX ORDER — PREDNISOLONE ACETATE-GATIFLOXACIN-BROMFENAC .75; 5; 1 MG/ML; MG/ML; MG/ML
1 SUSPENSION/ DROPS OPHTHALMIC 3 TIMES DAILY
Qty: 5 ML | Refills: 3 | Status: SHIPPED | OUTPATIENT
Start: 2024-02-09 | End: 2024-03-05

## 2024-02-09 NOTE — PROGRESS NOTES
HPI    Ref: Dr. Christensen    Hyperopia with regular astigmatism and presbyopia, bilateral  Type 2 diabetes mellitus with stage 3a chronic kidney disease, with   long-term current use of insulin  Type 2 diabetes mellitus with both eyes affected by moderate   nonproliferative retinopathy without macular edema, without long-term   current use of insulin  Conjunctivitis, allergic, bilateral  Dry eye syndrome, bilateral  Optic nerve cupping of both eyes   Cortical cataract of both eyes                                 Gtt's:  Systane PRN OU    Patient is here today for cataract evaluation.  Pt. States vision  at time is blurry at distance and near.  Pt. See floaters and flashes in OU.  Pt. Have dry eyes. Pt. Doesn't drive at night.  Pt. Denies diplopia, halos of light or glare, photophobia, pain or   discomfort.   Last edited by Juana Schmitz on 2/9/2024  1:29 PM.            Assessment /Plan     For exam results, see Encounter Report.    Nuclear sclerotic cataract of right eye  -     IOL Master - OD - Right Eye    Nuclear sclerotic cataract of left eye      Visually significant nuclear sclerotic cataract   - Interfering with activities of daily living.  Pt desires cataract surgery for [facility] rehabilitation.   - R/B/A discussed and pt agrees to proceed with surgery.   - IOL options discussed according to patient's goals and concomitant ocular pathology; and pt content with monofocal lens.    - Target: plano.                 Diboo 21.0 OD - decided against MFIOL.    DIboo 21.5 OS

## 2024-02-26 NOTE — PROGRESS NOTES
Mr. Zamorano is a patient of Dr. Bowden and was last seen in clinic 5/9/2023.      Subjective:   Patient ID:  Ravi Zamorano is a 68 y.o. male who presents for follow up of Atrial Fibrillation and Bradycardia  .     HPI:    Mr. Zamorano is a 68 y.o. male with DM, kidney tx (2016), HFpEF, AF here for follow up.     Background:    Ravi Zamorano has a past medical history of DM on insulin, CKD2, history of kidney transplant in 2016 (Cr 1.5 in 2/2021), and diastolic dysfunction, who was diagnosed with rate controlled AF in 8/2019, manifesting as worsening DOSS. An echo done in 8/2019 showed an EF of 55% and severe LAE (MINA 54.3 mL/m2). He underwent DCCV in 8/2019, and was started on flecainide 100 mg bid post-procedure.    An echo in 6/2021 showed an EF of 55%. A 2 week [employer] DX monitor done in 7/2019 showed no arrhythmias.    Apart from some occasional lightheadedness Mr. Zamorano feels well, and denies recurrent arrhythmias.    My interpretation of today's ECG is sinus bradycardia at 50 bpm with a QRSd of 120 ms.  In summary, Ravi Zamorano is a 66 year old male with persistent AF, who underwent DCCV and flecainide initiation in 8/2019. He is maintaining sinus rhythm based on symptoms and normal 2 week event monitor in 7/2021. He is anticoagulated on eliquis.    Hospitalized with covid 7/2022- echo at the time showed preserved LVEF.    4/4/2023: He is maintainins SR on flecainide but is reporting more LH and DOSS. Franky with increased MS and QRS on ECG. Will reduce flecainide to 50mg BID and check Holter to evaluate rates. If intervals or rates do not improve, will stop flecainide. We discussed PVI if AF recurs off AAD. CHADSVASc 3 on eliquis.     5/9/2023: He is maintaining sinus rhythm on low dose flecainide. ECG intervals are narrower and back to baseline. Inverted T waves are new. Update ischemic eval.  He has a syncopal episode with LH prior - likely due to hypotension. LH has improved off chlorthalidone.  Holter showed no AVB and normal HR curve. We did discuss possible ILR if he has another syncopal episode.  CHADSVASc 3 on eliquis.    Update (02/27/2024):    SPECT 5/15/2023 negative for ischemia or scar    Event monitor 1/2024. One episode of AF noted 12/24/2023.  Episodes of SR and junctional rhythm with HR ranging from 54-74.    Today he reports chronic DOSS. Episodes of LH when hypotensive. No palpitations, CP, syncope.    He is currently taking eliquis 5mg BID for stroke prophylaxis and denies significant bleeding episodes. He is currently being treated with flecainide 50mg BID for rhythm control and carvedilol 25mg BID for HR control.  Kidney function is low, with a creatinine of 1.7 on 11/14/2023.    I have personally reviewed the patient's EKG today, which shows SR with 1st deg AVB and IVCD at 62. OR interval is 308. QRS is 130. QTc is 434.    Relevant Cardiac Test Results:    2D Echo (7/11/2022):  The left ventricle is normal in size with eccentric hypertrophy and normal systolic function. The estimated ejection fraction is 65%.  Normal right ventricular size with normal right ventricular systolic function.  Left ventricular diastolic dysfunction.  Biatrial enlargement.  Mild aortic regurgitation.  PA systolic pressure is at elast 39 mmHg. The IVC is not visualized.    Current Outpatient Medications   Medication Sig    apixaban (ELIQUIS) 5 mg Tab Take 1 tablet (5 mg total) by mouth 2 (two) times daily.    aspirin 81 MG Chew Take 81 mg by mouth once daily.    atorvastatin (LIPITOR) 40 MG tablet Take 1 tablet (40 mg total) by mouth once daily.    blood-glucose sensor Kayla Gin 3, use as directed, change every 14 days , e 11.65    carvediloL (COREG) 25 MG tablet Take 1 tablet (25 mg total) by mouth 2 (two) times daily.    empagliflozin (JARDIANCE) 10 mg tablet Take 1 tablet (10 mg total) by mouth once daily.    famotidine (PEPCID) 20 MG tablet Take 1 tablet (20 mg total) by mouth every evening.    flecainide  "(TAMBOCOR) 50 MG Tab Take 1 tablet (50 mg total) by mouth 2 (two) times a day.    gabapentin (NEURONTIN) 300 MG capsule Take 1 capsule by mouth in the morning, 1 capsule midday, and 3 capsules at night.    hydroCHLOROthiazide (HYDRODIURIL) 25 MG tablet Take 1 tablet (25 mg total) by mouth once daily.    insulin (LANTUS SOLOSTAR U-100 INSULIN) glargine 100 units/mL SubQ pen Inject 16 Units into the skin every morning.    insulin aspart U-100 (NOVOLOG) 100 unit/mL (3 mL) InPn pen Inject 18 units w/ breakfast and lunch, 14 units at dinner scale 180-230+2, 231-280+4, 281-330+6, 331-380+8, >380+10.  *Max daily 80 units.*    meclizine (ANTIVERT) 25 mg tablet Take 1 tablet (25 mg total) by mouth 3 (three) times daily as needed for Dizziness.    melatonin 5 mg Tab Take 1 tablet (5 mg total) by mouth every evening. (Patient taking differently: Take 1 tablet by mouth as needed.)    mycophenolate (CELLCEPT) 250 mg Cap Take 4 capsules (1,000 mg total) by mouth 2 (two) times daily.    pen needle, diabetic (BD ULTRA-FINE LO PEN NEEDLE) 32 gauge x 5/32" Ndle USE TO ADMINISTER INSULIN 4 TIMES DAILY.    pen needle, diabetic (BD ULTRA-FINE LO PEN NEEDLE) 32 gauge x 5/32" Ndle use four times a day    prednisolon/gatiflox/bromfenac (PREDNISOL ACE-GATIFLOX-BROMFEN) 1-0.5-0.075 % DrpS Apply 1 drop to eye 3 (three) times daily.    predniSONE (DELTASONE) 5 MG tablet Take 1 tablet (5 mg total) by mouth once daily.    spironolactone (ALDACTONE) 25 MG tablet Take 1 tablet (25 mg total) by mouth once daily.    tacrolimus (PROGRAF) 0.5 MG Cap Take 1 capsule (0.5 mg total) by mouth every 12 (twelve) hours.    valsartan (DIOVAN) 320 MG tablet Take 1 tablet (320 mg total) by mouth once daily.     No current facility-administered medications for this visit.     Review of Systems   Constitutional: Negative for malaise/fatigue.   Cardiovascular:  Positive for dyspnea on exertion. Negative for chest pain, irregular heartbeat, leg swelling and " "palpitations.   Respiratory:  Positive for shortness of breath.    Hematologic/Lymphatic: Negative for bleeding problem.   Skin:  Negative for rash.   Musculoskeletal:  Negative for myalgias.   Gastrointestinal:  Negative for hematemesis, hematochezia and nausea.   Genitourinary:  Negative for hematuria.   Neurological:  Positive for light-headedness.   Psychiatric/Behavioral:  Negative for altered mental status.    Allergic/Immunologic: Negative for persistent infections.     Objective:        BP (!) 171/74   Pulse 62   Ht 6' 1" (1.854 m)   Wt 87 kg (191 lb 12.8 oz)   BMI 25.30 kg/m²     Physical Exam  Vitals and nursing note reviewed.   Constitutional:       Appearance: Normal appearance. He is well-developed.   HENT:      Head: Normocephalic.      Nose: Nose normal.   Eyes:      Pupils: Pupils are equal, round, and reactive to light.   Cardiovascular:      Rate and Rhythm: Regular rhythm. Bradycardia present.   Pulmonary:      Effort: No respiratory distress.      Breath sounds: Normal breath sounds.   Musculoskeletal:         General: Normal range of motion.   Skin:     General: Skin is warm and dry.      Findings: No erythema.   Neurological:      Mental Status: He is alert and oriented to person, place, and time.   Psychiatric:         Speech: Speech normal.         Behavior: Behavior normal.       Lab Results   Component Value Date     (L) 11/14/2023    K 5.3 (H) 11/14/2023    MG 1.7 11/11/2022    BUN 22 11/14/2023    CREATININE 1.7 (H) 11/14/2023    ALT <5 (L) 11/14/2023    AST 9 (L) 11/14/2023    HGB 9.1 (L) 11/14/2023    HCT 32.1 (L) 11/14/2023    HCT 31 (L) 11/05/2016    TSH 1.143 03/15/2019    LDLCALC 61.4 (L) 06/07/2023           Assessment:     1. Paroxysmal A-fib    2. Anticoagulant long-term use    3. Encounter for monitoring flecainide therapy    4. Chronic diastolic congestive heart failure      Plan:     In summary, Mr. Zamorano is a 68 y.o. male with DM, kidney tx (2016), HFpEF, AF here " for follow up.   He remains in sinus rhythm on low dose flecainide but is latha with some junctional rhythm on event monitor and [place] interval >300ms and QRS 130ms.  Will go ahead and stop flecainide. Options for rhythm control include PVI or another AAD+PPM. We did discuss that chances of going back into AF are high (one brief episode of AF on recent event monitor).  Also recommend Kardia device for home rhythm monitoring. Risks, benefits, alternatives to PVI discuss. They would like to think about it. May need clearance from nephrology prior to proceeding.    Stop flecainide  Consider PVI  Continue other meds  RTC as scheduled or 3 mo      *A copy of this note has been sent to Dr. Bowden*    Follow up in 3 months (on 5/27/2024), or as scheduled.    ------------------------------------------------------------------    REYES Flynn, NP-C  Cardiac Electrophysiology

## 2024-02-27 ENCOUNTER — HOSPITAL ENCOUNTER (OUTPATIENT)
Dept: CARDIOLOGY | Facility: CLINIC | Age: 69
Discharge: HOME | End: 2024-02-27
Payer: MEDICARE

## 2024-02-27 ENCOUNTER — OFFICE VISIT (OUTPATIENT)
Dept: ELECTROPHYSIOLOGY | Facility: CLINIC | Age: 69
End: 2024-02-27
Payer: MEDICARE

## 2024-02-27 VITALS
HEIGHT: 73 IN | WEIGHT: 191.81 LBS | HEART RATE: 62 BPM | SYSTOLIC BLOOD PRESSURE: 171 MMHG | BODY MASS INDEX: 25.42 KG/M2 | DIASTOLIC BLOOD PRESSURE: 74 MMHG

## 2024-02-27 DIAGNOSIS — I48.0 PAROXYSMAL A-FIB: Primary | ICD-10-CM

## 2024-02-27 DIAGNOSIS — I50.32 CHRONIC DIASTOLIC CONGESTIVE HEART FAILURE: ICD-10-CM

## 2024-02-27 DIAGNOSIS — I49.8 OTHER CARDIAC ARRHYTHMIA: ICD-10-CM

## 2024-02-27 DIAGNOSIS — Z51.81 ENCOUNTER FOR MONITORING FLECAINIDE THERAPY: ICD-10-CM

## 2024-02-27 DIAGNOSIS — Z79.01 ANTICOAGULANT LONG-TERM USE: ICD-10-CM

## 2024-02-27 DIAGNOSIS — Z79.899 ENCOUNTER FOR MONITORING FLECAINIDE THERAPY: ICD-10-CM

## 2024-02-27 LAB
OHS QRS DURATION: 130 MS
OHS QTC CALCULATION: 434 MS

## 2024-02-27 PROCEDURE — 93010 ELECTROCARDIOGRAM REPORT: CPT | Mod: S$GLB,,, | Performed by: INTERNAL MEDICINE

## 2024-02-27 PROCEDURE — 1126F AMNT PAIN NOTED NONE PRSNT: CPT | Mod: CPTII,S$GLB,, | Performed by: NURSE PRACTITIONER

## 2024-02-27 PROCEDURE — 99214 OFFICE O/P EST MOD 30 MIN: CPT | Mod: S$GLB,,, | Performed by: NURSE PRACTITIONER

## 2024-02-27 PROCEDURE — 3077F SYST BP >= 140 MM HG: CPT | Mod: CPTII,S$GLB,, | Performed by: NURSE PRACTITIONER

## 2024-02-27 PROCEDURE — 1159F MED LIST DOCD IN RCRD: CPT | Mod: CPTII,S$GLB,, | Performed by: NURSE PRACTITIONER

## 2024-02-27 PROCEDURE — 1101F PT FALLS ASSESS-DOCD LE1/YR: CPT | Mod: CPTII,S$GLB,, | Performed by: NURSE PRACTITIONER

## 2024-02-27 PROCEDURE — 1160F RVW MEDS BY RX/DR IN RCRD: CPT | Mod: CPTII,S$GLB,, | Performed by: NURSE PRACTITIONER

## 2024-02-27 PROCEDURE — 99999 PR PBB SHADOW E&M-EST. PATIENT-LVL V: CPT | Mod: PBBFAC,,, | Performed by: NURSE PRACTITIONER

## 2024-02-27 PROCEDURE — 3078F DIAST BP <80 MM HG: CPT | Mod: CPTII,S$GLB,, | Performed by: NURSE PRACTITIONER

## 2024-02-27 PROCEDURE — 93005 ELECTROCARDIOGRAM TRACING: CPT | Mod: S$GLB,,, | Performed by: INTERNAL MEDICINE

## 2024-02-27 PROCEDURE — 3008F BODY MASS INDEX DOCD: CPT | Mod: CPTII,S$GLB,, | Performed by: NURSE PRACTITIONER

## 2024-02-27 PROCEDURE — 3288F FALL RISK ASSESSMENT DOCD: CPT | Mod: CPTII,S$GLB,, | Performed by: NURSE PRACTITIONER

## 2024-02-27 NOTE — Clinical Note
Pt with hx of DM, kidney tx (2016), HFpEF, AF. Back in AF today per ECG obtained by PCP. Flecainide stopped due to bradycardia, [place]>300ms, wide QRS. Pt would like to proceed with PVI if possible. Thx.

## 2024-03-01 ENCOUNTER — TELEPHONE (OUTPATIENT)
Dept: INTERNAL MEDICINE | Facility: CLINIC | Age: 69
End: 2024-03-01
Payer: MEDICARE

## 2024-03-01 NOTE — TELEPHONE ENCOUNTER
Watery stool x 2 weeks. Pt states his stomach feels like water is boiling in the inside with growling sensations and noises. Pt would like to know what he can do to stop this.

## 2024-03-01 NOTE — TELEPHONE ENCOUNTER
----- Message from Argelia Leal MA sent at 3/1/2024 10:44 AM CST -----  Contact: [phone]    ----- Message -----  From: Glenys Ramirez  Sent: 3/1/2024   9:50 AM CST  To: Martina Guillermo Staff    [email or web address]     Patient is calling for Medical Advice regarding:stool watery     How long has patient had these symptoms:2 weeks     Pharmacy name and phone#:      [facility] #[id number] - [address] AT [address] [address]  Phone: [phone] Fax: [phone]         Would like response via [employer]:  no     Comments:  Pt is asking if there is anything he can take for his stool being watery

## 2024-03-01 NOTE — TELEPHONE ENCOUNTER
Recommend he be evaluated in person. If does not feel he can wait until next week can go to urgent care.   In meantime recommend bland diet.

## 2024-03-05 ENCOUNTER — OFFICE VISIT (OUTPATIENT)
Dept: INTERNAL MEDICINE | Facility: CLINIC | Age: 69
End: 2024-03-05
Payer: MEDICARE

## 2024-03-05 VITALS
HEIGHT: 73 IN | RESPIRATION RATE: 18 BRPM | TEMPERATURE: 99 F | SYSTOLIC BLOOD PRESSURE: 144 MMHG | DIASTOLIC BLOOD PRESSURE: 70 MMHG | BODY MASS INDEX: 24.62 KG/M2 | WEIGHT: 185.75 LBS | HEART RATE: 63 BPM | OXYGEN SATURATION: 96 %

## 2024-03-05 DIAGNOSIS — R05.8 OTHER COUGH: ICD-10-CM

## 2024-03-05 DIAGNOSIS — R19.5 WATERY STOOLS: Primary | ICD-10-CM

## 2024-03-05 PROCEDURE — 3288F FALL RISK ASSESSMENT DOCD: CPT | Mod: CPTII,S$GLB,, | Performed by: NURSE PRACTITIONER

## 2024-03-05 PROCEDURE — 99214 OFFICE O/P EST MOD 30 MIN: CPT | Mod: S$GLB,,, | Performed by: NURSE PRACTITIONER

## 2024-03-05 PROCEDURE — 1160F RVW MEDS BY RX/DR IN RCRD: CPT | Mod: CPTII,S$GLB,, | Performed by: NURSE PRACTITIONER

## 2024-03-05 PROCEDURE — 99999 PR PBB SHADOW E&M-EST. PATIENT-LVL V: CPT | Mod: PBBFAC,,, | Performed by: NURSE PRACTITIONER

## 2024-03-05 PROCEDURE — 3078F DIAST BP <80 MM HG: CPT | Mod: CPTII,S$GLB,, | Performed by: NURSE PRACTITIONER

## 2024-03-05 PROCEDURE — 3008F BODY MASS INDEX DOCD: CPT | Mod: CPTII,S$GLB,, | Performed by: NURSE PRACTITIONER

## 2024-03-05 PROCEDURE — 1126F AMNT PAIN NOTED NONE PRSNT: CPT | Mod: CPTII,S$GLB,, | Performed by: NURSE PRACTITIONER

## 2024-03-05 PROCEDURE — 1159F MED LIST DOCD IN RCRD: CPT | Mod: CPTII,S$GLB,, | Performed by: NURSE PRACTITIONER

## 2024-03-05 PROCEDURE — 3077F SYST BP >= 140 MM HG: CPT | Mod: CPTII,S$GLB,, | Performed by: NURSE PRACTITIONER

## 2024-03-05 PROCEDURE — 1101F PT FALLS ASSESS-DOCD LE1/YR: CPT | Mod: CPTII,S$GLB,, | Performed by: NURSE PRACTITIONER

## 2024-03-05 RX ORDER — BENZONATATE 100 MG/1
100 CAPSULE ORAL 3 TIMES DAILY PRN
Qty: 30 CAPSULE | Refills: 0 | Status: SHIPPED | OUTPATIENT
Start: 2024-03-05 | End: 2024-04-01

## 2024-03-05 RX ORDER — DIPHENOXYLATE HYDROCHLORIDE AND ATROPINE SULFATE 2.5; .025 MG/1; MG/1
1 TABLET ORAL 4 TIMES DAILY PRN
Qty: 30 TABLET | Refills: 0 | Status: ON HOLD | OUTPATIENT
Start: 2024-03-05 | End: 2024-04-01

## 2024-03-05 NOTE — PROGRESS NOTES
"Subjective     Patient ID: Ravi Zamorano is a 68 y.o. male.    Chief Complaint: Diarrhea (Diarrhea for about two weeks. Watery stool x2 weeks. Pt states he also has a slight cough. ), Cough (Pt has a slight cough. ), and Dizziness (Pt states he has been feeling a little light headed.)    Pt of Dr. Mack, here for, "watery stool x 2 weeks"    LPN in office spoke to pt on 3-1-24 and documented, "Watery stool x 2 weeks. Pt states his stomach feels like water is boiling in the inside with growling sensations and noises. Pt would like to know what he can do to stop this."    PCP responded stating, "Recommend he be evaluated in person. If does not feel he can wait until next week can go to urgent care. In meantime recommend bland diet."    Diarrhea   This is a recurrent problem. The current episode started 1 to 4 weeks ago (2 weeks). The problem occurs 2 to 4 times per day. The problem has been unchanged. Associated symptoms include bloating, coughing and increased flatus. Pertinent negatives include no abdominal pain, arthralgias, chills, fever, headaches, myalgias, sweats, URI, vomiting or weight loss.     Review of Systems   Constitutional:  Positive for appetite change. Negative for activity change, chills, diaphoresis, fatigue, fever, unexpected weight change and weight loss.   Respiratory:  Positive for cough. Negative for chest tightness.    Cardiovascular:  Negative for chest pain, palpitations, leg swelling and claudication.   Gastrointestinal:  Positive for abdominal distention, bloating, diarrhea and flatus. Negative for abdominal pain, blood in stool, change in bowel habit, constipation, nausea, rectal pain, vomiting, reflux and fecal incontinence.        As documented in HPI     Musculoskeletal:  Negative for arthralgias and myalgias.   Allergic/Immunologic: Negative for environmental allergies, food allergies and frequent infections.   Neurological:  Negative for dizziness, weakness, light-headedness, " numbness and headaches.   Hematological:  Negative for adenopathy. Does not bruise/bleed easily.   Psychiatric/Behavioral:  Negative for suicidal ideas.        Review of patient's allergies indicates:   Allergen Reactions    Nifedipine Other (See Comments)     Gingival hyperplasia         Current Outpatient Medications:     apixaban (ELIQUIS) 5 mg Tab, Take 1 tablet (5 mg total) by mouth 2 (two) times daily., Disp: 60 tablet, Rfl: 11    aspirin 81 MG Chew, Take 81 mg by mouth once daily., Disp: , Rfl:     atorvastatin (LIPITOR) 40 MG tablet, Take 1 tablet (40 mg total) by mouth once daily., Disp: 90 tablet, Rfl: 0    blood-glucose sensor Kayla Gin 3, use as directed, change every 14 days , e 11.65, Disp: 2 each, Rfl: 11    carvediloL (COREG) 25 MG tablet, Take 1 tablet (25 mg total) by mouth 2 (two) times daily., Disp: 180 tablet, Rfl: 3    empagliflozin (JARDIANCE) 10 mg tablet, Take 1 tablet (10 mg total) by mouth once daily., Disp: 30 tablet, Rfl: 11    famotidine (PEPCID) 20 MG tablet, Take 1 tablet (20 mg total) by mouth every evening., Disp: 90 tablet, Rfl: 3    gabapentin (NEURONTIN) 300 MG capsule, Take 1 capsule by mouth in the morning, 1 capsule midday, and 3 capsules at night., Disp: 450 capsule, Rfl: 3    hydroCHLOROthiazide (HYDRODIURIL) 25 MG tablet, Take 1 tablet (25 mg total) by mouth once daily., Disp: 30 tablet, Rfl: 11    insulin (LANTUS SOLOSTAR U-100 INSULIN) glargine 100 units/mL SubQ pen, Inject 16 Units into the skin every morning., Disp: 15 mL, Rfl: 6    insulin aspart U-100 (NOVOLOG) 100 unit/mL (3 mL) InPn pen, Inject 18 units w/ breakfast and lunch, 14 units at dinner scale 180-230+2, 231-280+4, 281-330+6, 331-380+8, >380+10.  *Max daily 80 units.*, Disp: 30 mL, Rfl: 6    meclizine (ANTIVERT) 25 mg tablet, Take 1 tablet (25 mg total) by mouth 3 (three) times daily as needed for Dizziness., Disp: 21 tablet, Rfl: 0    melatonin 5 mg Tab, Take 1 tablet (5 mg total) by mouth every evening.  "(Patient taking differently: Take 1 tablet by mouth as needed.), Disp: , Rfl:     mycophenolate (CELLCEPT) 250 mg Cap, Take 4 capsules (1,000 mg total) by mouth 2 (two) times daily., Disp: 240 capsule, Rfl: 11    pen needle, diabetic (BD ULTRA-FINE LO PEN NEEDLE) 32 gauge x 5/32" Ndle, USE TO ADMINISTER INSULIN 4 TIMES DAILY., Disp: 400 each, Rfl: 3    pen needle, diabetic (BD ULTRA-FINE LO PEN NEEDLE) 32 gauge x 5/32" Ndle, use four times a day, Disp: 100 each, Rfl: 11    predniSONE (DELTASONE) 5 MG tablet, Take 1 tablet (5 mg total) by mouth once daily., Disp: 30 tablet, Rfl: 11    spironolactone (ALDACTONE) 25 MG tablet, Take 1 tablet (25 mg total) by mouth once daily., Disp: 90 tablet, Rfl: 1    tacrolimus (PROGRAF) 0.5 MG Cap, Take 1 capsule (0.5 mg total) by mouth every 12 (twelve) hours., Disp: 60 capsule, Rfl: 11    valsartan (DIOVAN) 320 MG tablet, Take 1 tablet (320 mg total) by mouth once daily. (Patient not taking: Reported on 2/27/2024), Disp: 90 tablet, Rfl: 3    Patient Active Problem List   Diagnosis    Type 2 diabetes mellitus with stage 3a chronic kidney disease, with long-term current use of insulin    Diabetic polyneuropathy associated with type 2 diabetes mellitus    Hypertension since 2000    Hyperlipidemia    Depression - situational    Anxiety    Prophylactic immunotherapy    Chronic diastolic congestive heart failure    Hyperparathyroidism due to renal insufficiency    S/P cadaveric kidney transplant 11/5/2016. ESRD secondary to HTN/DMII    Adverse effect of glucocorticoid or synthetic analogue    Adverse effect of immunosuppressive drug    Long-term use of immunosuppressant medication    Anemia of chronic disease    Type 2 diabetes mellitus with both eyes affected by moderate nonproliferative retinopathy without macular edema, without long-term current use of insulin    Persistent atrial fibrillation    Anticoagulant long-term use    Encounter for monitoring flecainide therapy    " Peyronie's disease    Erectile dysfunction due to arterial insufficiency    BPH with urinary obstruction    History of colon polyps    COVID-19 virus infection    Stage 3b chronic kidney disease    PAD (peripheral artery disease)    Claudication of both lower extremities    Paroxysmal A-fib       Past Medical History:   Diagnosis Date    Anxiety 7/29/2014    Arthritis     Bilateral diabetic retinopathy 2017    CKD (chronic kidney disease) stage 2, GFR 60-89 ml/min 12/28/2016    CKD (chronic kidney disease) stage 4, GFR 15-29 ml/min 7/29/2014    Colon polyps 2014    Depression - situational 7/29/2014    Diabetes mellitus     Diabetes type 2 since 2000 7/29/2014    Diabetic neuropathy 7/29/2014    History of cardioversion 10/3/2019    History of hepatitis C, s/p successful Rx w/ SVR24 - 9/2017 7/29/2014    Genotype 1a, treatment naive 10/2014 liver biopsy - grade 1 / stage 1 Completed Harvoni w/ SVR    Hyperlipidemia 7/29/2014    Hypertension     Neuropathy     Organ transplant candidate 7/29/2014    Pancreatitis     S/P cadaveric kidney transplant 11/5/2016. ESRD secondary to HTN/DMII 11/5/2016    Stage 3a chronic kidney disease 12/28/2016    Type 2 diabetes mellitus with stage 3a chronic kidney disease, with long-term current use of insulin 7/29/2014       Past Surgical History:   Procedure Laterality Date    APPENDECTOMY      COLONOSCOPY N/A 12/21/2020    Procedure: COLONOSCOPY;  Surgeon: Tico Bell MD;  Location: [facility] ([address] [place] [address]);  Service: Endoscopy;  Laterality: N/A;  ok to hold eliquis per Dr. Valadez, see telephone encounter 11/13/2020-MS  covid test 12/18 [place]    KIDNEY TRANSPLANT      TRANSESOPHAGEAL ECHOCARDIOGRAPHY N/A 8/26/2019    Procedure: ECHOCARDIOGRAM, TRANSESOPHAGEAL;  Surgeon: Dolores Diagnostic Provider;  Location: [facility];  Service: Cardiology;  Laterality: N/A;    TREATMENT OF CARDIAC ARRHYTHMIA N/A 8/26/2019    Procedure: CARDIOVERSION;  Surgeon: Bronson Bowden MD;  Location: [facility];  Service: Cardiology;  Laterality: N/A;  AF, FELICIANO, DCCV, MAC, GP, 3 PREP       Social History     Socioeconomic History    Marital status:    Occupational History     [employer]er[employer]   Tobacco Use    Smoking status: Former     Current packs/day: 0.00     Average packs/day: 0.5 packs/day for 32.0 years (16.0 ttl pk-yrs)     Types: Cigarettes     Start date: 1984     Quit date: 2016     Years since quittin.2    Smokeless tobacco: Never    Tobacco comments:     Pt reports that he quit in , but started up again in . pt reports he is currently working on quitting again   Substance and Sexual Activity    Alcohol use: Yes     Comment: Pt reports occasional beers on Sundays. Pt reports drinking daily prior to ESRD diagnosis.    Drug use: No    Sexual activity: Yes     Partners: Female   Social History Narrative     for 14 years     for 29 years    2 children ages 35, 36     Social Determinants of Health     Financial Resource Strain: Low Risk  (10/10/2023)    Overall Financial Resource Strain (CARDIA)     Difficulty of Paying Living Expenses: Not hard at all   Food Insecurity: No Food Insecurity (10/10/2023)    Hunger Vital Sign     Worried About Running Out of Food in the Last Year: Never true     Ran Out of Food in the Last Year: Never true   Transportation Needs: No Transportation Needs (10/10/2023)    PRAPARE - Transportation     Lack of Transportation (Medical): No     Lack of Transportation (Non-Medical): No   Physical Activity: Inactive (10/10/2023)    Exercise Vital Sign     Days of Exercise per Week: 0 days     Minutes of Exercise per Session: 0 min   Stress: No Stress Concern Present (10/10/2023)    [employer] - Occupational Stress Questionnaire     Feeling of Stress : Not at all   Social Connections: Moderately Isolated (10/10/2023)    Social Connection and Isolation Panel [NHANES]     Frequency of Communication  "with Friends and Family: More than three times a week     Frequency of Social Gatherings with Friends and Family: Once a week     Attends Tenriism Services: Never     Active Member of Clubs or Organizations: No     Attends Club or Organization Meetings: Never     Marital Status:    Housing Stability: Unknown (10/10/2023)    Housing Stability Vital Sign     Unable to Pay for Housing in the Last Year: No     Unstable Housing in the Last Year: No       Family History   Problem Relation Age of Onset    Diabetes Mother     Hypertension Mother     Heart disease Mother         CAD with PCI    Heart disease Father     Cancer Brother         one sister with breast cancer    Hypertension Brother         one sister with HTN and borderline DM    Stroke Neg Hx     Kidney disease Neg Hx        Objective     Vitals:    03/05/24 1320   BP: (!) 144/70   Pulse: 63   Resp: 18   Temp: 98.7 °F (37.1 °C)   TempSrc: Oral   SpO2: 96%   Weight: 84.2 kg (185 lb 11.8 oz)   Height: 6' 1" (1.854 m)   PainSc: 0-No pain     Body mass index is 24.51 kg/m².    Physical Exam  Vitals and nursing note reviewed.   Constitutional:       Appearance: Normal appearance. He is normal weight.   HENT:      Head: Normocephalic.      Nose: Nose normal.      Mouth/Throat:      Mouth: Mucous membranes are moist.   Eyes:      Conjunctiva/sclera: Conjunctivae normal.   Cardiovascular:      Rate and Rhythm: Normal rate and regular rhythm.      Heart sounds: Normal heart sounds.   Pulmonary:      Effort: Pulmonary effort is normal.      Breath sounds: Normal breath sounds.   Abdominal:      General: Abdomen is flat. Bowel sounds are normal.      Palpations: Abdomen is soft.   Musculoskeletal:         General: Normal range of motion.   Skin:     General: Skin is warm and dry.   Neurological:      General: No focal deficit present.      Mental Status: He is alert and oriented to person, place, and time.   Psychiatric:         Mood and Affect: Mood normal.       "   Behavior: Behavior normal.         Thought Content: Thought content normal.         Judgment: Judgment normal.            Assessment and Plan     1. Watery stools  -     WBC, Stool; Future; Expected date: 03/05/2024  -     CULTURE, STOOL; Future; Expected date: 03/05/2024  -     Stool Exam-Ova,Cysts,Parasites; Future; Expected date: 03/05/2024  -     Giardia / Cryptosporidum, EIA; Future; Expected date: 03/05/2024  -     H. pylori antigen, stool; Future; Expected date: 03/05/2024    2. BMI 24.0-24.9, adult  BMI reviewed    Stool studies to be done, will message with results    Written script for lomotil every 6 hrs as needed for loose stools    Clear liquid diet, advance to bland BRAT diet    Hydration    Tesslon perles every 8 hrs as needed for cough    Self care instructions provided in AVS             Follow up if symptoms worsen or fail to improve.

## 2024-03-05 NOTE — PATIENT INSTRUCTIONS
Stool studies to be done, will message with results    Written script for lomotil every 6 hrs as needed for loose stools    Clear liquid diet, advance to bland BRAT diet    Hydration    Tesslon perles every 8 hrs as needed for cough

## 2024-03-06 DIAGNOSIS — H25.12 NUCLEAR SCLEROTIC CATARACT OF LEFT EYE: Primary | ICD-10-CM

## 2024-03-06 RX ORDER — PREDNISOLONE ACETATE-GATIFLOXACIN-BROMFENAC .75; 5; 1 MG/ML; MG/ML; MG/ML
1 SUSPENSION/ DROPS OPHTHALMIC 3 TIMES DAILY
Qty: 5 ML | Refills: 3 | Status: ON HOLD | OUTPATIENT
Start: 2024-03-06 | End: 2024-04-01

## 2024-03-07 DIAGNOSIS — R19.5 WATERY STOOLS: Primary | ICD-10-CM

## 2024-03-11 ENCOUNTER — LAB VISIT (OUTPATIENT)
Dept: LAB | Facility: HOSPITAL | Age: 69
End: 2024-03-11
Payer: MEDICARE

## 2024-03-11 ENCOUNTER — TELEPHONE (OUTPATIENT)
Dept: OPHTHALMOLOGY | Facility: CLINIC | Age: 69
End: 2024-03-11
Payer: MEDICARE

## 2024-03-11 DIAGNOSIS — E11.22 TYPE 2 DIABETES MELLITUS WITH STAGE 3A CHRONIC KIDNEY DISEASE, WITH LONG-TERM CURRENT USE OF INSULIN: ICD-10-CM

## 2024-03-11 DIAGNOSIS — Z79.4 TYPE 2 DIABETES MELLITUS WITH STAGE 3A CHRONIC KIDNEY DISEASE, WITH LONG-TERM CURRENT USE OF INSULIN: ICD-10-CM

## 2024-03-11 DIAGNOSIS — N18.31 TYPE 2 DIABETES MELLITUS WITH STAGE 3A CHRONIC KIDNEY DISEASE, WITH LONG-TERM CURRENT USE OF INSULIN: ICD-10-CM

## 2024-03-11 LAB
ESTIMATED AVG GLUCOSE: 192 MG/DL (ref 68–131)
HBA1C MFR BLD: 8.3 % (ref 4–5.6)

## 2024-03-11 PROCEDURE — 83036 HEMOGLOBIN GLYCOSYLATED A1C: CPT | Performed by: NURSE PRACTITIONER

## 2024-03-11 PROCEDURE — 36415 COLL VENOUS BLD VENIPUNCTURE: CPT | Performed by: NURSE PRACTITIONER

## 2024-03-11 NOTE — TELEPHONE ENCOUNTER
Spoke with pt provided arrival time of 7:00 for sx on 3/13/24 with Dr. Palacio @ [facility]. Pt has eyedrops and packet.

## 2024-03-11 NOTE — H&P
History    Chief complaint:  Painless progressive vision loss    Present Ilness/Diagnosis: Nuclear sclerotic Cataract    Past Medical History:  has a past medical history of Anxiety (7/29/2014), Arthritis, Bilateral diabetic retinopathy (2017), CKD (chronic kidney disease) stage 2, GFR 60-89 ml/min (12/28/2016), CKD (chronic kidney disease) stage 4, GFR 15-29 ml/min (7/29/2014), Colon polyps (2014), Depression - situational (7/29/2014), Diabetes mellitus, Diabetes type 2 since 2000 (7/29/2014), Diabetic neuropathy (7/29/2014), History of cardioversion (10/3/2019), History of hepatitis C, s/p successful Rx w/ SVR24 - 9/2017 (7/29/2014), Hyperlipidemia (7/29/2014), Hypertension, Neuropathy, Organ transplant candidate (7/29/2014), Pancreatitis, S/P cadaveric kidney transplant 11/5/2016. ESRD secondary to HTN/DMII (11/5/2016), Stage 3a chronic kidney disease (12/28/2016), and Type 2 diabetes mellitus with stage 3a chronic kidney disease, with long-term current use of insulin (7/29/2014).    Family History/Social History: refer to chart    ASA Score: II     Mallampati Score: II     Plan for Sedation: Moderate Sedation     Patient or Family History of Anesthesia problems : No      Allergies:   Review of patient's allergies indicates:   Allergen Reactions    Nifedipine Other (See Comments)     Gingival hyperplasia       Current Medications: No current facility-administered medications for this encounter.    Current Outpatient Medications:     apixaban (ELIQUIS) 5 mg Tab, Take 1 tablet (5 mg total) by mouth 2 (two) times daily., Disp: 60 tablet, Rfl: 11    aspirin 81 MG Chew, Take 81 mg by mouth once daily., Disp: , Rfl:     atorvastatin (LIPITOR) 40 MG tablet, Take 1 tablet (40 mg total) by mouth once daily., Disp: 90 tablet, Rfl: 0    benzonatate (TESSALON) 100 MG capsule, Take 1 capsule (100 mg total) by mouth 3 (three) times daily as needed for Cough., Disp: 30 capsule, Rfl: 0    blood-glucose sensor Kayla Gin 3, use  "as directed, change every 14 days , e 11.65, Disp: 2 each, Rfl: 11    carvediloL (COREG) 25 MG tablet, Take 1 tablet (25 mg total) by mouth 2 (two) times daily., Disp: 180 tablet, Rfl: 3    diphenoxylate-atropine 2.5-0.025 mg (LOMOTIL) 2.5-0.025 mg per tablet, Take 1 tablet by mouth 4 (four) times daily as needed for Diarrhea., Disp: 30 tablet, Rfl: 0    empagliflozin (JARDIANCE) 10 mg tablet, Take 1 tablet (10 mg total) by mouth once daily., Disp: 30 tablet, Rfl: 11    famotidine (PEPCID) 20 MG tablet, Take 1 tablet (20 mg total) by mouth every evening., Disp: 90 tablet, Rfl: 3    gabapentin (NEURONTIN) 300 MG capsule, Take 1 capsule by mouth in the morning, 1 capsule midday, and 3 capsules at night., Disp: 450 capsule, Rfl: 3    hydroCHLOROthiazide (HYDRODIURIL) 25 MG tablet, Take 1 tablet (25 mg total) by mouth once daily., Disp: 30 tablet, Rfl: 11    insulin (LANTUS SOLOSTAR U-100 INSULIN) glargine 100 units/mL SubQ pen, Inject 16 Units into the skin every morning., Disp: 15 mL, Rfl: 6    insulin aspart U-100 (NOVOLOG) 100 unit/mL (3 mL) InPn pen, Inject 18 units w/ breakfast and lunch, 14 units at dinner scale 180-230+2, 231-280+4, 281-330+6, 331-380+8, >380+10.  *Max daily 80 units.*, Disp: 30 mL, Rfl: 6    meclizine (ANTIVERT) 25 mg tablet, Take 1 tablet (25 mg total) by mouth 3 (three) times daily as needed for Dizziness., Disp: 21 tablet, Rfl: 0    melatonin 5 mg Tab, Take 1 tablet (5 mg total) by mouth every evening. (Patient taking differently: Take 1 tablet by mouth as needed.), Disp: , Rfl:     mycophenolate (CELLCEPT) 250 mg Cap, Take 4 capsules (1,000 mg total) by mouth 2 (two) times daily., Disp: 240 capsule, Rfl: 11    pen needle, diabetic (BD ULTRA-FINE LO PEN NEEDLE) 32 gauge x 5/32" Ndle, USE TO ADMINISTER INSULIN 4 TIMES DAILY., Disp: 400 each, Rfl: 3    pen needle, diabetic (BD ULTRA-FINE LO PEN NEEDLE) 32 gauge x 5/32" Ndle, use four times a day, Disp: 100 each, Rfl: 11    " prednisolon/gatiflox/bromfenac (PREDNISOL ACE-GATIFLOX-BROMFEN) 1-0.5-0.075 % DrpS, Apply 1 drop to eye 3 (three) times daily., Disp: 5 mL, Rfl: 3    predniSONE (DELTASONE) 5 MG tablet, Take 1 tablet (5 mg total) by mouth once daily., Disp: 30 tablet, Rfl: 11    spironolactone (ALDACTONE) 25 MG tablet, Take 1 tablet (25 mg total) by mouth once daily., Disp: 90 tablet, Rfl: 1    tacrolimus (PROGRAF) 0.5 MG Cap, Take 1 capsule (0.5 mg total) by mouth every 12 (twelve) hours., Disp: 60 capsule, Rfl: 11    valsartan (DIOVAN) 320 MG tablet, Take 1 tablet (320 mg total) by mouth once daily., Disp: 90 tablet, Rfl: 3    Physical Exam    BP: Vital signs stable  General: No apparent distress  HEENT: nuclear sclerotic cataract  Lungs: adequate respirations  Heart: + pulses  Abdomen: soft  Rectal/pelvic: deferred    Impression: Visually significant Cataract.    See previous clinic notes for surgical indications.    Plan: Phacoemulsification with implantation of Intraocular lens

## 2024-03-12 DIAGNOSIS — Z94.0 KIDNEY REPLACED BY TRANSPLANT: Primary | ICD-10-CM

## 2024-03-12 NOTE — PRE-PROCEDURE INSTRUCTIONS
Patient reviewed on 3/12/2024.  Okay to proceed at [facility]. The following pre-procedure instructions and arrival time have been reviewed with patient via phone and sent to patient portal for review.  Patient verbalized an understanding.  Pt to be accompanied by wife-Erika day of procedure as responsible .      Basic pre-procedure instructions in preparation for procedure on 3/13/24 with Dr. Palacio     You should have received your arrival time already from Dr. Daniel's office.    - Nothing to eat or drink after midnight the night before your procedure until after your procedure, except AM meds with small sips of water.     - HOLD all oral Diabetic medications night before and morning of procedure  - HOLD all Insulin morning of procedure  - HOLD all Fluid pills morning of procedure  - HOLD all non-insulin shots until after surgery (Ozempic, Mounjaro, Trulicity, Victoza, Byetta, Wegovy and Adlyxin) (7 days prior)  - HOLD all vitamins, minerals and herbal supplements morning of procedure   - TAKE all B/P meds, EXCEPT those that contain a fluid pill (ex. Lasix, Hydroclorothiazide/HCTZ)  - USE inhalers as needed and bring AM of surgery  - USE eye drops as directed  -TAKE blood thinner meds AM of surgery unless otherwise instructed by your provider to not take     - Shower and wash face with dial soap for 3 mins PM prior and AM of surgery  - No powder, lotions, creams, oils, gels, ointments, makeup,  or jewelry    - Wear comfortable clothing (button up shirt)     (Patient is required to have a responsible ride to transport home, ride may not leave while patient is in surgery)     -- [facility], 2nd floor Surgery Center, located   @ [address] [place] [address] Floor Registration      If you have any questions or concerns please feel free to contact your surgeon's office.        Please reply to this message as receipt of delivery.    Catina, LPN [facility]  Pre-Admit - Anesthesia Dept

## 2024-03-13 ENCOUNTER — HOSPITAL ENCOUNTER (OUTPATIENT)
Facility: HOSPITAL | Age: 69
Discharge: HOME | End: 2024-03-13
Attending: OPHTHALMOLOGY | Admitting: OPHTHALMOLOGY
Payer: MEDICARE

## 2024-03-13 VITALS
SYSTOLIC BLOOD PRESSURE: 156 MMHG | HEART RATE: 71 BPM | HEIGHT: 73 IN | BODY MASS INDEX: 23.86 KG/M2 | TEMPERATURE: 97 F | OXYGEN SATURATION: 98 % | RESPIRATION RATE: 16 BRPM | DIASTOLIC BLOOD PRESSURE: 75 MMHG | WEIGHT: 180 LBS

## 2024-03-13 DIAGNOSIS — H25.10 AGE-RELATED NUCLEAR CATARACT: ICD-10-CM

## 2024-03-13 DIAGNOSIS — H25.11 NUCLEAR SCLEROTIC CATARACT OF RIGHT EYE: Primary | ICD-10-CM

## 2024-03-13 PROBLEM — N18.32 STAGE 3B CHRONIC KIDNEY DISEASE: Status: RESOLVED | Noted: 2022-12-01 | Resolved: 2024-03-13

## 2024-03-13 LAB — POCT GLUCOSE: 118 MG/DL (ref 70–110)

## 2024-03-13 PROCEDURE — 63600175 PHARM REV CODE 636 W HCPCS: Performed by: OPHTHALMOLOGY

## 2024-03-13 PROCEDURE — V2632 POST CHMBR INTRAOCULAR LENS: HCPCS | Performed by: OPHTHALMOLOGY

## 2024-03-13 PROCEDURE — 25000003 PHARM REV CODE 250: Performed by: OPHTHALMOLOGY

## 2024-03-13 PROCEDURE — 82962 GLUCOSE BLOOD TEST: CPT | Performed by: OPHTHALMOLOGY

## 2024-03-13 PROCEDURE — 27201423 OPTIME MED/SURG SUP & DEVICES STERILE SUPPLY: Performed by: OPHTHALMOLOGY

## 2024-03-13 PROCEDURE — 25000003 PHARM REV CODE 250

## 2024-03-13 PROCEDURE — 66984 XCAPSL CTRC RMVL W/O ECP: CPT | Mod: RT,,, | Performed by: OPHTHALMOLOGY

## 2024-03-13 PROCEDURE — 99900035 HC TECH TIME PER 15 MIN (STAT)

## 2024-03-13 PROCEDURE — 36000706: Performed by: OPHTHALMOLOGY

## 2024-03-13 PROCEDURE — 94761 N-INVAS EAR/PLS OXIMETRY MLT: CPT

## 2024-03-13 PROCEDURE — 71000015 HC POSTOP RECOV 1ST HR: Performed by: OPHTHALMOLOGY

## 2024-03-13 PROCEDURE — 36000707: Performed by: OPHTHALMOLOGY

## 2024-03-13 DEVICE — LENS EYHANCE +21.0D: Type: IMPLANTABLE DEVICE | Site: EYE | Status: FUNCTIONAL

## 2024-03-13 RX ORDER — TETRACAINE HYDROCHLORIDE 5 MG/ML
1 SOLUTION OPHTHALMIC
Status: ACTIVE | OUTPATIENT
Start: 2024-03-13

## 2024-03-13 RX ORDER — MOXIFLOXACIN 5 MG/ML
1 SOLUTION/ DROPS OPHTHALMIC
Status: COMPLETED | OUTPATIENT
Start: 2024-03-13 | End: 2024-03-13

## 2024-03-13 RX ORDER — MOXIFLOXACIN 5 MG/ML
SOLUTION/ DROPS OPHTHALMIC
Status: DISCONTINUED | OUTPATIENT
Start: 2024-03-13 | End: 2024-03-13 | Stop reason: HOSPADM

## 2024-03-13 RX ORDER — MIDAZOLAM HYDROCHLORIDE 1 MG/ML
6 INJECTION INTRAMUSCULAR; INTRAVENOUS ONCE
Status: COMPLETED | OUTPATIENT
Start: 2024-03-13 | End: 2024-03-13

## 2024-03-13 RX ORDER — PROPARACAINE HYDROCHLORIDE 5 MG/ML
SOLUTION/ DROPS OPHTHALMIC
Status: DISCONTINUED | OUTPATIENT
Start: 2024-03-13 | End: 2024-03-13 | Stop reason: HOSPADM

## 2024-03-13 RX ORDER — PROPARACAINE HYDROCHLORIDE 5 MG/ML
1 SOLUTION/ DROPS OPHTHALMIC DAILY PRN
Status: ACTIVE | OUTPATIENT
Start: 2024-03-13

## 2024-03-13 RX ORDER — PROPARACAINE HYDROCHLORIDE 5 MG/ML
1 SOLUTION/ DROPS OPHTHALMIC
Status: DISCONTINUED | OUTPATIENT
Start: 2024-03-13 | End: 2024-03-13 | Stop reason: HOSPADM

## 2024-03-13 RX ORDER — LIDOCAINE HYDROCHLORIDE 40 MG/ML
INJECTION, SOLUTION RETROBULBAR
Status: DISCONTINUED | OUTPATIENT
Start: 2024-03-13 | End: 2024-03-13 | Stop reason: HOSPADM

## 2024-03-13 RX ORDER — PREDNISOLONE ACETATE 10 MG/ML
SUSPENSION/ DROPS OPHTHALMIC
Status: DISCONTINUED | OUTPATIENT
Start: 2024-03-13 | End: 2024-03-13 | Stop reason: HOSPADM

## 2024-03-13 RX ORDER — ACETAMINOPHEN 325 MG/1
650 TABLET ORAL EVERY 4 HOURS PRN
Status: DISCONTINUED | OUTPATIENT
Start: 2024-03-13 | End: 2024-03-13 | Stop reason: HOSPADM

## 2024-03-13 RX ORDER — SODIUM CHLORIDE 0.9 % (FLUSH) 0.9 %
10 SYRINGE (ML) INJECTION
Status: ACTIVE | OUTPATIENT
Start: 2024-03-13

## 2024-03-13 RX ORDER — CYCLOP/TROP/PROPA/PHEN/KET/WAT 1-1-0.1%
1 DROPS (EA) OPHTHALMIC (EYE)
Status: COMPLETED | OUTPATIENT
Start: 2024-03-13 | End: 2024-03-13

## 2024-03-13 RX ORDER — LIDOCAINE HYDROCHLORIDE 10 MG/ML
INJECTION, SOLUTION EPIDURAL; INFILTRATION; INTRACAUDAL; PERINEURAL
Status: DISCONTINUED | OUTPATIENT
Start: 2024-03-13 | End: 2024-03-13 | Stop reason: HOSPADM

## 2024-03-13 RX ORDER — PHENYLEPHRINE HYDROCHLORIDE 100 MG/ML
1 SOLUTION/ DROPS OPHTHALMIC
Status: ACTIVE | OUTPATIENT
Start: 2024-03-13

## 2024-03-13 RX ADMIN — MIDAZOLAM 2 MG: 1 INJECTION INTRAMUSCULAR; INTRAVENOUS at 08:03

## 2024-03-13 RX ADMIN — MOXIFLOXACIN OPHTHALMIC 1 DROP: 5 SOLUTION/ DROPS OPHTHALMIC at 07:03

## 2024-03-13 RX ADMIN — Medication 1 DROP: at 07:03

## 2024-03-13 RX ADMIN — MOXIFLOXACIN 1 DROP: 5 SOLUTION/ DROPS OPHTHALMIC at 08:03

## 2024-03-13 NOTE — PLAN OF CARE
Ravi LUANN LopezJaun has met all discharge criteria from Phase II. Vital Signs are stable, ambulating  without difficulty. Discharge instructions given, patient verbalized understanding. Discharged from facility via wheelchair in stable condition.

## 2024-03-13 NOTE — INTERVAL H&P NOTE
Patient examined today.  No changes since last H&P written.    Any changes affecting the anesthesia assessment which may have changed since the initial assessment and H and P: No

## 2024-03-13 NOTE — PROGRESS NOTES
CHIEF COMPLAINT: Type 2 Diabetes     HPI: Mr. Ravi Zamorano is a 68 y.o. male who was diagnosed with Type 2 DM in 2000.  S/p kidney transplant 2016  Last seen by me in fall 2023.  Accompanied by wife.  Having work up for stools.  Dealing with diarrhea.  Had congestion, decrease appetite.     Audio visit in 2022.   Using maycol 3.  Avg 155 mg/dl   Gmi 7%  Glucose variability 25.3%   TIR 73%       On stable prograf does, pred 5 mg daily   +PN     Lab Results   Component Value Date    HGBA1C 8.3 (H) 03/11/2024       Past Medical History:   Diagnosis Date    Anxiety 7/29/2014    Arthritis     Bilateral diabetic retinopathy 2017    CKD (chronic kidney disease) stage 2, GFR 60-89 ml/min 12/28/2016    CKD (chronic kidney disease) stage 4, GFR 15-29 ml/min 7/29/2014    Colon polyps 2014    Depression - situational 7/29/2014    Diabetes mellitus     Diabetes type 2 since 2000 7/29/2014    Diabetic neuropathy 7/29/2014    History of cardioversion 10/3/2019    History of hepatitis C, s/p successful Rx w/ SVR24 - 9/2017 7/29/2014    Genotype 1a, treatment naive 10/2014 liver biopsy - grade 1 / stage 1 Completed Harvoni w/ SVR    Hyperlipidemia 7/29/2014    Hypertension     Neuropathy     Organ transplant candidate 7/29/2014    Pancreatitis     S/P cadaveric kidney transplant 11/5/2016. ESRD secondary to HTN/DMII 11/5/2016    Stage 3a chronic kidney disease 12/28/2016    Type 2 diabetes mellitus with stage 3a chronic kidney disease, with long-term current use of insulin 7/29/2014     Remains on MDI---lantus and humalog     PREVIOUS DIABETES MEDICATIONS TRIED  lantus  humalog  novolog  levemir  trulicity -wt loss   tradjenta  Metformin   ozempic   Jardiance     CURRENT DIABETIC MEDS: lantus 16 units in the am, novolog 18-18-18 units ac w/ scale 150-200+2, etc., holds dinner dose, jardiance 10 mg daily   Breakfast, sick days - skip lunch, eats dinner  Smoothie-vegan   On MDI (injections 4 x a day)   Makes frequent changes to  "his/her insulin regimen on the basis of blood glucose data  Testing 4 x a day max   Patient is willing and able to use the device  Demonstrated an understanding of the technology and is motivated to use CGM  Patient expected to adhere to a comprehensive diabetes treatment plan and patient has adequate medical supervision    Has multiple impaired awareness of hypoglycemia (hypoglycemia unawareness)  Needs work with dietary habits    Diabetes Management Status    Statin: Taking  ACE/ARB: Taking    Screening or Prevention Patient's value Goal Complete/Controlled?   HgA1C Testing and Control   Lab Results   Component Value Date    HGBA1C 8.3 (H) 03/11/2024      Annually/Less than 8% Yes   Lipid profile : 06/07/2023 Annually Yes   LDL control Lab Results   Component Value Date    LDLCALC 61.4 (L) 06/07/2023    Annually/Less than 100 mg/dl  Yes   Nephropathy screening Lab Results   Component Value Date    LABMICR 544.0 03/01/2023     Lab Results   Component Value Date    PROTEINUA 2+ (A) 11/14/2023    Annually Yes   Blood pressure BP Readings from Last 1 Encounters:   03/14/24 124/62    Less than 140/90 No   Dilated retinal exam : 02/09/2024 Annually Yes   Foot exam   Most Recent Foot Exam Date: Not Found Annually No     REVIEW OF SYSTEMS  General: no weakness, fatigue, + weight changes 15# loss   Eyes: no double or blurred vision, eye pain, or redness  Cardiovascular: no chest pain, palpitations, + ankle edema, or murmurs.   Respiratory: no cough or dyspnea.   GI: no heartburn, nausea, or changes in bowel patterns; good appetite.   Skin: no rashes, dryness, itching, or reactions at insulin injection sites.  Neuro: + numbness, tingling-gabapentin,RLS, tremors, or vertigo. +loss of strength in legs  Endocrine: no polyuria, polydipsia, polyphagia, heat or cold intolerance.     Vital Signs  /62 (BP Location: Left arm, Patient Position: Sitting, BP Method: Medium (Manual))   Pulse 62   Ht 6' 1" (1.854 m)   Wt 84.1 kg " (185 lb 6.5 oz)   SpO2 100%   BMI 24.46 kg/m²     Hemoglobin A1C   Date Value Ref Range Status   03/11/2024 8.3 (H) 4.0 - 5.6 % Final     Comment:     ADA Screening Guidelines:  5.7-6.4%  Consistent with prediabetes  >or=6.5%  Consistent with diabetes    High levels of fetal hemoglobin interfere with the HbA1C  assay. Heterozygous hemoglobin variants (HbS, HgC, etc)do  not significantly interfere with this assay.   However, presence of multiple variants may affect accuracy.     11/14/2023 8.4 (H) 4.0 - 5.6 % Final     Comment:     ADA Screening Guidelines:  5.7-6.4%  Consistent with prediabetes  >or=6.5%  Consistent with diabetes    High levels of fetal hemoglobin interfere with the HbA1C  assay. Heterozygous hemoglobin variants (HbS, HgC, etc)do  not significantly interfere with this assay.   However, presence of multiple variants may affect accuracy.     06/07/2023 7.5 (H) 4.0 - 5.6 % Final     Comment:     ADA Screening Guidelines:  5.7-6.4%  Consistent with prediabetes  >or=6.5%  Consistent with diabetes    High levels of fetal hemoglobin interfere with the HbA1C  assay. Heterozygous hemoglobin variants (HbS, HgC, etc)do  not significantly interfere with this assay.   However, presence of multiple variants may affect accuracy.          Chemistry        Component Value Date/Time     (L) 11/14/2023 0855    K 5.3 (H) 11/14/2023 0855     11/14/2023 0855    CO2 21 (L) 11/14/2023 0855    BUN 22 11/14/2023 0855    CREATININE 1.7 (H) 11/14/2023 0855     (H) 11/14/2023 0855        Component Value Date/Time    CALCIUM 9.1 11/14/2023 0855    ALKPHOS 52 (L) 11/14/2023 0855    AST 9 (L) 11/14/2023 0855    ALT <5 (L) 11/14/2023 0855    BILITOT 0.3 11/14/2023 0855    ESTGFRAFRICA 51.9 (A) 07/19/2022 0723    EGFRNONAA 44.9 (A) 07/19/2022 0723           Lab Results   Component Value Date    TSH 1.143 03/15/2019      Lab Results   Component Value Date    CHOL 131 06/07/2023    CHOL 132 03/01/2023    CHOL 130  06/22/2021     Lab Results   Component Value Date    HDL 31 (L) 06/07/2023    HDL 33 (L) 03/01/2023    HDL 30 (L) 06/22/2021     Lab Results   Component Value Date    LDLCALC 61.4 (L) 06/07/2023    LDLCALC 71.4 03/01/2023    LDLCALC 45.6 (L) 06/22/2021     Lab Results   Component Value Date    TRIG 193 (H) 06/07/2023    TRIG 138 03/01/2023    TRIG 272 (H) 06/22/2021     Lab Results   Component Value Date    CHOLHDL 23.7 06/07/2023    CHOLHDL 25.0 03/01/2023    CHOLHDL 23.1 06/22/2021       PHYSICAL EXAMINATION  Constitutional: Appears well, no distress. Reviewed vitals above.  Eyes: conjunctivae & lids intact; PERRLA, EOMs intact; optic discs   Neck: Supple, trachea midline.   Respiratory: no wheezes, even and unlabored  Cardiovascular: RRR; s1s2   Lymph: deferred   Skin: warm and dry; no injection site reactions, no acanthosis nigracans observed.  Neuro:patient alert and cooperative, normal affect; steady gait.  Psychiatric: judgement & insight intact, orientation of time, place & person intact, memory; mood & affect wnl     Diabetes Foot Exam:   Deferred     Assessment/Plan    1. Type 2 diabetes mellitus with stage 3a chronic kidney disease, with long-term current use of insulin  Hemoglobin A1C      2. 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 Adverse effect of glucocorticoid or synthetic analogue, subsequent encounter        4. Chronic diastolic congestive heart failure        5. Claudication of both lower extremities        6. Diabetic polyneuropathy associated with type 2 diabetes mellitus        7. Depression, unspecified depression type        8. Mixed hyperlipidemia        9. Primary hypertension        10. PAD (peripheral artery disease)        11. Paroxysmal A-fib        12. Long-term use of immunosuppressant medication        13. S/P cadaveric kidney transplant 11/5/2016. ESRD  secondary to HTN/DMII        14. Anticoagulant long-term use        15. Anxiety          1-5. Follow up in 4 months w/ me   A1c prior -4 months   A1c goal less than 7.5%  Lab Results   Component Value Date    LDLCALC 61.4 (L) 06/07/2023     At goal   F/u with cards   F/u with transplant  Having issue with great toe(L)  Dealing w/ vertigo, diarrhea  Using cgm maycol 3   Cut back dinner to 8 units   Continue novolog 18-18 units before breakfast and lunch     FOLLOW UP  In 4 months

## 2024-03-13 NOTE — DISCHARGE INSTRUCTIONS
Dr. Palacio     Cataract Post-Operative Instructions       Day of surgery:     -Resume drops THREE times daily into the operative eye.     -Do not rub your eye     -Wear protective sunglasses during the day.     -Resume moderate activity.     -Bathe/shower/wash face normally     -Do not apply makeup around the operative eye for 1 week.     -You should expect:     Blurry vision and halos for 24-48 hours     Dilated pupil for 24-48 hours     Scratchy feeling in the eye for 1-2 days     Curved shadow in your peripheral vision for 2-3 weeks     Occasional flickering of lights for up to 1 week     -If you experience severe pain or nausea, call Dr. Palacio or the on-call doctor at [phone].       Plan to see Dr. Palacio tomorrow at:     [facility] [address]     10TH FLOOR     [facility] [address]     **Most patients can drive the next morning.  If you do not feel comfortable driving, please arrange for transportation. **

## 2024-03-13 NOTE — DISCHARGE SUMMARY
[facility]  Discharge Note  Short Stay    Procedure(s) (LRB):  EXTRACTION, CATARACT, WITH IOL INSERTION (Right)    BRIEF DISCHARGE NOTE:    Date of discharge: 03/13/2024    Reason for hospitalization -  Cataract surgery     Final Diagnosis - Visually significant Cataract    Procedures and treatment provided - Status post phacoemulsification with placement of intraocular lens     Diet - Advance to regular as tolerated    Activity - as tolerated    Disposition at the end of the case - Good.    Discharge: to home    The patient tolerated the procedure well and knows to follow up with me tomorrow morning in the eye clinic, sooner if needed.    Patient and family instructions (as appropriate) - Given to patient on discharge    Jennifer Palacio MD

## 2024-03-13 NOTE — OP NOTE
DATE OF PROCEDURE: 03/13/2024    SURGEON: AZUCENA CONTRERAS MD    PREOPERATIVE DIAGNOSIS:  Senile nuclear sclerotic cataract right eye.     POSTOPERATIVE DIAGNOSIS: Senile nuclear sclerotic cataract right eye.     PROCEDURE PERFORMED:  Phacoemulsification with placement of intraocular lens, right eye.    IMPLANT:  DIBOO  21.0    Anesthesia: Moderate sedation with topical Lidocaine. Patient ID confirmed and re-evaluated the patient and anesthesia plan confirming it is suitable for the patient's condition and procedure.     COMPLICATIONS: none    ESTIMATED BLOOD LOSS: <1cc    SPECIMENS: none    INDICATIONS FOR PROCEDURE:   The patient has a history of painless progressive vision loss.  The patient has described difficulties with activities of daily living, which specifically include driving, which is secondary to cataract formation and progression. After we had a thorough discussion about risks, benefits, and alternatives to cataract surgery, the patient agreed to proceed with phacoemulsification and implantation of a lens in the right eye.  These risks include, but are not limited to, hemorrhage, pain, infection, need for additional surgery, need for glasses or contacts, loss of vision, or even loss of the eye.    PROCEDURE IN DETAIL:  The patient was met in the preop holding area.  Consent was confirmed to be signed.  The operative site was marked.  The patient was brought into the operating room by the anesthesia team and placed under monitored anesthesia care.  The right eye was prepped and draped in a sterile ophthalmic fashion.  A Kasie speculum was placed into the right eye.   A paracentesis site was made and 1% preservative-free lidocaine was injected into the anterior chamber.  Viscoelastic  material was injected into the anterior chamber.  A keratome blade was used to make a clear corneal incision.  A cystotome was used to initiate the continuous curvilinear capsulorrhexis which was completed with Utrata  forceps.  BSS on a mejia cannula was used to perform hydrodissection.  The phacoemulsification tip was introduced into the eye and the nucleus was removed in a standard divide-and-conquer fashion.  Remaining cortical material was removed from the eye using irrigation-aspiration.  The capsular bag was filled with viscoelastic material and the intraocular lens was injected and positioned into place. Remaining viscoelastic material was removed from the eye using irrigation and aspiration.  The corneal wounds were hydrated.  The eye was filled to physiologic pressure. The wounds were found to be watertight. Drops of Vigamox and prednisilone were placed into the eye.  The eye was washed, dried, and shielded.  The patient tolerated the procedure well and knows to follow up with me tomorrow morning, sooner if needed.

## 2024-03-14 ENCOUNTER — TELEPHONE (OUTPATIENT)
Dept: INTERNAL MEDICINE | Facility: CLINIC | Age: 69
End: 2024-03-14

## 2024-03-14 ENCOUNTER — OFFICE VISIT (OUTPATIENT)
Dept: OPHTHALMOLOGY | Facility: CLINIC | Age: 69
End: 2024-03-14
Payer: MEDICARE

## 2024-03-14 ENCOUNTER — OFFICE VISIT (OUTPATIENT)
Dept: INTERNAL MEDICINE | Facility: CLINIC | Age: 69
End: 2024-03-14
Payer: MEDICARE

## 2024-03-14 VITALS
BODY MASS INDEX: 24.58 KG/M2 | WEIGHT: 185.44 LBS | SYSTOLIC BLOOD PRESSURE: 124 MMHG | HEIGHT: 73 IN | HEART RATE: 62 BPM | OXYGEN SATURATION: 100 % | DIASTOLIC BLOOD PRESSURE: 62 MMHG

## 2024-03-14 DIAGNOSIS — Z94.0 S/P KIDNEY TRANSPLANT: ICD-10-CM

## 2024-03-14 DIAGNOSIS — R42 VERTIGO: ICD-10-CM

## 2024-03-14 DIAGNOSIS — E11.22 TYPE 2 DIABETES MELLITUS WITH STAGE 3A CHRONIC KIDNEY DISEASE, WITH LONG-TERM CURRENT USE OF INSULIN: Primary | ICD-10-CM

## 2024-03-14 DIAGNOSIS — T38.0X5D ADVERSE EFFECT OF GLUCOCORTICOID OR SYNTHETIC ANALOGUE, SUBSEQUENT ENCOUNTER: ICD-10-CM

## 2024-03-14 DIAGNOSIS — F41.9 ANXIETY: ICD-10-CM

## 2024-03-14 DIAGNOSIS — I73.9 PAD (PERIPHERAL ARTERY DISEASE): ICD-10-CM

## 2024-03-14 DIAGNOSIS — E11.42 DIABETIC POLYNEUROPATHY ASSOCIATED WITH TYPE 2 DIABETES MELLITUS: ICD-10-CM

## 2024-03-14 DIAGNOSIS — N18.31 TYPE 2 DIABETES MELLITUS WITH STAGE 3A CHRONIC KIDNEY DISEASE, WITH LONG-TERM CURRENT USE OF INSULIN: Primary | ICD-10-CM

## 2024-03-14 DIAGNOSIS — E11.3393 TYPE 2 DIABETES MELLITUS WITH BOTH EYES AFFECTED BY MODERATE NONPROLIFERATIVE RETINOPATHY WITHOUT MACULAR EDEMA, WITHOUT LONG-TERM CURRENT USE OF INSULIN: ICD-10-CM

## 2024-03-14 DIAGNOSIS — E78.2 MIXED HYPERLIPIDEMIA: ICD-10-CM

## 2024-03-14 DIAGNOSIS — I48.0 PAROXYSMAL A-FIB: ICD-10-CM

## 2024-03-14 DIAGNOSIS — Z79.01 ANTICOAGULANT LONG-TERM USE: ICD-10-CM

## 2024-03-14 DIAGNOSIS — I10 PRIMARY HYPERTENSION: ICD-10-CM

## 2024-03-14 DIAGNOSIS — Z96.1 STATUS POST CATARACT EXTRACTION AND INSERTION OF INTRAOCULAR LENS, RIGHT: Primary | ICD-10-CM

## 2024-03-14 DIAGNOSIS — Z79.4 TYPE 2 DIABETES MELLITUS WITH STAGE 3A CHRONIC KIDNEY DISEASE, WITH LONG-TERM CURRENT USE OF INSULIN: Primary | ICD-10-CM

## 2024-03-14 DIAGNOSIS — Z98.41 STATUS POST CATARACT EXTRACTION AND INSERTION OF INTRAOCULAR LENS, RIGHT: Primary | ICD-10-CM

## 2024-03-14 DIAGNOSIS — I73.9 CLAUDICATION OF BOTH LOWER EXTREMITIES: ICD-10-CM

## 2024-03-14 DIAGNOSIS — I50.32 CHRONIC DIASTOLIC CONGESTIVE HEART FAILURE: ICD-10-CM

## 2024-03-14 DIAGNOSIS — Z79.60 LONG-TERM USE OF IMMUNOSUPPRESSANT MEDICATION: ICD-10-CM

## 2024-03-14 DIAGNOSIS — F32.A DEPRESSION, UNSPECIFIED DEPRESSION TYPE: ICD-10-CM

## 2024-03-14 PROCEDURE — 99999 PR PBB SHADOW E&M-EST. PATIENT-LVL V: CPT | Mod: PBBFAC,,, | Performed by: NURSE PRACTITIONER

## 2024-03-14 PROCEDURE — 3288F FALL RISK ASSESSMENT DOCD: CPT | Mod: CPTII,S$GLB,, | Performed by: OPHTHALMOLOGY

## 2024-03-14 PROCEDURE — 1126F AMNT PAIN NOTED NONE PRSNT: CPT | Mod: CPTII,S$GLB,, | Performed by: NURSE PRACTITIONER

## 2024-03-14 PROCEDURE — 99214 OFFICE O/P EST MOD 30 MIN: CPT | Mod: S$GLB,,, | Performed by: NURSE PRACTITIONER

## 2024-03-14 PROCEDURE — 1159F MED LIST DOCD IN RCRD: CPT | Mod: CPTII,S$GLB,, | Performed by: OPHTHALMOLOGY

## 2024-03-14 PROCEDURE — 99024 POSTOP FOLLOW-UP VISIT: CPT | Mod: S$GLB,,, | Performed by: OPHTHALMOLOGY

## 2024-03-14 PROCEDURE — 1159F MED LIST DOCD IN RCRD: CPT | Mod: CPTII,S$GLB,, | Performed by: NURSE PRACTITIONER

## 2024-03-14 PROCEDURE — 3074F SYST BP LT 130 MM HG: CPT | Mod: CPTII,S$GLB,, | Performed by: NURSE PRACTITIONER

## 2024-03-14 PROCEDURE — 3052F HG A1C>EQUAL 8.0%<EQUAL 9.0%: CPT | Mod: CPTII,S$GLB,, | Performed by: NURSE PRACTITIONER

## 2024-03-14 PROCEDURE — 1101F PT FALLS ASSESS-DOCD LE1/YR: CPT | Mod: CPTII,S$GLB,, | Performed by: OPHTHALMOLOGY

## 2024-03-14 PROCEDURE — 99999 PR PBB SHADOW E&M-EST. PATIENT-LVL III: CPT | Mod: PBBFAC,,, | Performed by: OPHTHALMOLOGY

## 2024-03-14 PROCEDURE — 1160F RVW MEDS BY RX/DR IN RCRD: CPT | Mod: CPTII,S$GLB,, | Performed by: NURSE PRACTITIONER

## 2024-03-14 PROCEDURE — 3052F HG A1C>EQUAL 8.0%<EQUAL 9.0%: CPT | Mod: CPTII,S$GLB,, | Performed by: OPHTHALMOLOGY

## 2024-03-14 PROCEDURE — 3288F FALL RISK ASSESSMENT DOCD: CPT | Mod: CPTII,S$GLB,, | Performed by: NURSE PRACTITIONER

## 2024-03-14 PROCEDURE — 3008F BODY MASS INDEX DOCD: CPT | Mod: CPTII,S$GLB,, | Performed by: NURSE PRACTITIONER

## 2024-03-14 PROCEDURE — 3078F DIAST BP <80 MM HG: CPT | Mod: CPTII,S$GLB,, | Performed by: NURSE PRACTITIONER

## 2024-03-14 PROCEDURE — 95251 CONT GLUC MNTR ANALYSIS I&R: CPT | Mod: S$GLB,,, | Performed by: NURSE PRACTITIONER

## 2024-03-14 PROCEDURE — 1126F AMNT PAIN NOTED NONE PRSNT: CPT | Mod: CPTII,S$GLB,, | Performed by: OPHTHALMOLOGY

## 2024-03-14 PROCEDURE — 1101F PT FALLS ASSESS-DOCD LE1/YR: CPT | Mod: CPTII,S$GLB,, | Performed by: NURSE PRACTITIONER

## 2024-03-14 RX ORDER — INSULIN ASPART 100 [IU]/ML
INJECTION, SOLUTION INTRAVENOUS; SUBCUTANEOUS
Qty: 30 ML | Refills: 6 | Status: SHIPPED | OUTPATIENT
Start: 2024-03-14

## 2024-03-14 RX ORDER — MECLIZINE HYDROCHLORIDE 25 MG/1
25 TABLET ORAL 3 TIMES DAILY PRN
Qty: 21 TABLET | Refills: 3 | Status: SHIPPED | OUTPATIENT
Start: 2024-03-14

## 2024-03-14 RX ORDER — INSULIN GLARGINE 100 [IU]/ML
20 INJECTION, SOLUTION SUBCUTANEOUS EVERY MORNING
Qty: 15 ML | Refills: 6 | Status: SHIPPED | OUTPATIENT
Start: 2024-03-14

## 2024-03-14 NOTE — PATIENT INSTRUCTIONS
Lantus 20 units in the morning  Novolog 16-10-10 units before meals   Plus scale 180-230+2 ,etc  Cut in 1/2 if sugar is < 120  Jardiance 10 mg daily  Gin 3   .  Lab Results   Component Value Date    HGBA1C 8.3 (H) 03/11/2024     Goal less than 7.5%    [email or web address]  Eat fit marion  MyMol[employer] Healthcarenesspal marion  [email or web address]    mySugr marion     Gladiator, ensure max   Goal protein 70-80 gm per day  Carbs 30 -45 gm per meal     Follow up in 4 months w/Irielle  A1c bmp prior -4 months

## 2024-03-14 NOTE — TELEPHONE ENCOUNTER
----- Message from Lorena Steel DNP sent at 3/13/2024 10:48 AM CDT -----  Stool cultures negative notify pt    H-pylori still pending    Lorena Steel DNP, FNP-C

## 2024-03-14 NOTE — PROGRESS NOTES
HPI     Post-op Evaluation     Additional comments: 1 day phaco OD           Comments    Ref: Dr. Christensen     S/p phaco OD 03/13/24  Hyperopia with regular astigmatism and presbyopia, bilateral   Type 2 diabetes mellitus with stage 3a chronic kidney disease, with   long-term current use of insulin   Type 2 diabetes mellitus with both eyes affected by moderate nonprolife   rative retinopathy without macular edema, without long-term current use of   insulin   Conjunctivitis, allergic, bilateral   Dry eye syndrome, bilateral   Optic nerve cupping of both eyes   Cortical cataract of both eyes     PGB TID OD    Patient here for 1 day phaco OD.   Pt. States eye feels fine and is seeing well.  Pt. Denies pain or discomfort.            Last edited by Juana Schmitz on 3/14/2024  3:01 PM.            Assessment /Plan     For exam results, see Encounter Report.    Status post cataract extraction and insertion of intraocular lens, right      Slit Lamp Exam  L/L - normal  C/s - quiet  Cornea - clear  A/C - 1+ cell  Lens - PCIOL    POD #1 s/p phaco/IOL  - doing well  - continue the following drops:    vigamox or ocuflox TID x 1 wk then stop  Pred forte TID x  4 wks  Ketorolac TID until runs out    Versus:    Combination drop - 1 drop TID x total of 1 month    Appropriate precautions and post op medications reviewed.  Patient instructed to call or come in if symptoms of redness, decreased vision, or pain are experienced.    -f/up 1-2 wks, sooner PRN. Or 4 wks with optom for mrx if needed.

## 2024-03-15 ENCOUNTER — TELEPHONE (OUTPATIENT)
Dept: INTERNAL MEDICINE | Facility: CLINIC | Age: 69
End: 2024-03-15
Payer: MEDICARE

## 2024-03-15 DIAGNOSIS — A04.8 H. PYLORI INFECTION: Primary | ICD-10-CM

## 2024-03-15 RX ORDER — PANTOPRAZOLE SODIUM 40 MG/1
40 TABLET, DELAYED RELEASE ORAL DAILY
Qty: 14 TABLET | Refills: 0 | Status: ON HOLD | OUTPATIENT
Start: 2024-03-15 | End: 2024-04-01

## 2024-03-15 RX ORDER — CLARITHROMYCIN 500 MG/1
500 TABLET, FILM COATED ORAL 2 TIMES DAILY
Qty: 28 TABLET | Refills: 0 | Status: SHIPPED | OUTPATIENT
Start: 2024-03-15 | End: 2024-03-29

## 2024-03-15 NOTE — TELEPHONE ENCOUNTER
----- Message from Argelia Leal MA sent at 3/14/2024  3:59 PM CDT -----  Contact: Pt  [phone]    ----- Message -----  From: Maria De Jesus Dickerson  Sent: 3/14/2024   3:46 PM CDT  To: Martina Guillermo Staff    Returning a phone call.    Who left a message for the patient:  nurse    Do they know what this is regarding:  no    Would they like a phone call back or a response via [facility]:  call back

## 2024-03-21 ENCOUNTER — TELEPHONE (OUTPATIENT)
Dept: INTERNAL MEDICINE | Facility: CLINIC | Age: 69
End: 2024-03-21
Payer: MEDICARE

## 2024-03-21 NOTE — TELEPHONE ENCOUNTER
Called pt  Informed him that he can hold off on the BP med   Will check BP everyday and let us know.    Pt has annual scheduled in may, did you need him to be to seen sooner?

## 2024-03-21 NOTE — TELEPHONE ENCOUNTER
May continue to hold spironolactone. Please send updated BP readings. Due to see me or Randi - last visit 2022. Thanks.

## 2024-03-21 NOTE — TELEPHONE ENCOUNTER
Spoke to the pt. Yesterday, after taking the spirolactone, he experienced an episode of lightheadedness and feeling like he's going to pass out. His B/P was 107/55. He has not taking the drug again today and is feeling fine.

## 2024-03-21 NOTE — TELEPHONE ENCOUNTER
-- DO NOT REPLY / DO NOT REPLY ALL --  -- Message is from [facility] ([facility]) --    ONLY TO BE USED WITHIN A REFILL MEDICATION ENCOUNTER    Med Refill  Is the patient currently having any symptoms?: No/Non-Emergent symptoms    Name of medication requested: See pended med    Is this the first request for the medication in the last 48 hours?: Yes      Patient is requesting a medication refill - medication is on active list    Full name of the provider who ordered the medication: Bethanie Fitch MD    Clinic site name / Account # for provider: [facility]    Preferred Pharmacy: Pharmacy  [facility] - [place] [id number]    Patient confirmed the above pharmacy as correct?  Yes    Caller Information         Type Contact Phone/Fax    02/14/2024 12:31 PM CST Phone (Incoming) TANISHA MARROQUIN (Emergency Contact) [phone]            Alternative phone number: [phone] (Mobile)     Can a detailed message be left?: Yes         ----- Message from Argelia Leal MA sent at 3/21/2024  1:50 PM CDT -----  Contact: [phone]    ----- Message -----  From: Galina Padilla  Sent: 3/21/2024   1:50 PM CDT  To: Martina Guillermo Staff    [email or web address]     Patient is calling for Medical Advice regarding: Patient would like to know if taking spironolactone is for his blood pressure because he has been noticing some side affects from the medication that he would like to discuss with the doctor about it.     How long has patient had these symptoms: since taking medication.      Would like response via [employer]:  he don't use     Comments: Please call to advise.. Patient also states he has not taken said medication for 2 days and has notice a difference.

## 2024-03-22 ENCOUNTER — OFFICE VISIT (OUTPATIENT)
Dept: OPHTHALMOLOGY | Facility: CLINIC | Age: 69
End: 2024-03-22
Payer: MEDICARE

## 2024-03-22 DIAGNOSIS — Z96.1 STATUS POST CATARACT EXTRACTION AND INSERTION OF INTRAOCULAR LENS, RIGHT: Primary | ICD-10-CM

## 2024-03-22 DIAGNOSIS — Z98.41 STATUS POST CATARACT EXTRACTION AND INSERTION OF INTRAOCULAR LENS, RIGHT: Primary | ICD-10-CM

## 2024-03-22 DIAGNOSIS — H25.12 NUCLEAR SCLEROTIC CATARACT OF LEFT EYE: ICD-10-CM

## 2024-03-22 PROCEDURE — 1126F AMNT PAIN NOTED NONE PRSNT: CPT | Mod: CPTII,S$GLB,, | Performed by: OPHTHALMOLOGY

## 2024-03-22 PROCEDURE — 1101F PT FALLS ASSESS-DOCD LE1/YR: CPT | Mod: CPTII,S$GLB,, | Performed by: OPHTHALMOLOGY

## 2024-03-22 PROCEDURE — 1159F MED LIST DOCD IN RCRD: CPT | Mod: CPTII,S$GLB,, | Performed by: OPHTHALMOLOGY

## 2024-03-22 PROCEDURE — 99024 POSTOP FOLLOW-UP VISIT: CPT | Mod: S$GLB,,, | Performed by: OPHTHALMOLOGY

## 2024-03-22 PROCEDURE — 3052F HG A1C>EQUAL 8.0%<EQUAL 9.0%: CPT | Mod: CPTII,S$GLB,, | Performed by: OPHTHALMOLOGY

## 2024-03-22 PROCEDURE — 3288F FALL RISK ASSESSMENT DOCD: CPT | Mod: CPTII,S$GLB,, | Performed by: OPHTHALMOLOGY

## 2024-03-22 PROCEDURE — 92136 OPHTHALMIC BIOMETRY: CPT | Mod: 26,LT,S$GLB, | Performed by: OPHTHALMOLOGY

## 2024-03-22 PROCEDURE — 99999 PR PBB SHADOW E&M-EST. PATIENT-LVL III: CPT | Mod: PBBFAC,,, | Performed by: OPHTHALMOLOGY

## 2024-03-22 NOTE — PROGRESS NOTES
HPI     Post-op Evaluation     Additional comments: 1 week phaco OD           Comments    Ref: Dr. Christensen     S/p phaco OD 03/13/24   Hyperopia with regular astigmatism and presbyopia, bilateral   Type 2 diabetes mellitus with stage 3a chronic kidney disease, with   long-term current use of insulin   Type 2 diabetes mellitus with both eyes affec delfino by moderate nonprolife   rative retinopathy without macular edema, without long-term current use of   insulin   Conjunctivitis, allergic, bilateral   Dry eye syndrome, bilateral   Optic nerve cupping of both eyes   Cortical cataract of both eyes     PGB TID OD     Patient here for 1 week phaco OD.  Pt. States vision is doing well.  Pt. Denies pain or discomfort.          Last edited by Juana Schmitz on 3/22/2024  3:02 PM.            Assessment /Plan     For exam results, see Encounter Report.    Status post cataract extraction and insertion of intraocular lens, right      Slit Lamp Exam  L/L - normal  C/s - quiet  Cornea - clear  A/C - Tr+ cell  Lens - PCIOL    POW #1 s/p phaco/IOL OD  - doing well  - continue the following drops:    vigamox or ocuflox TID x 1 wk then stop  Start PF taper  Ketorolac TID until runs out    Versus:    Combination drop - 1 drop TID x total of 3 more weeks    Appropriate precautions and post op medications reviewed.  Patient instructed to call or come in if symptoms of redness, decreased vision, or pain are experienced.        Visually significant nuclear sclerotic cataract Left eye  - Interfering with activities of daily living.  Pt desires cataract surgery for [facility] rehabilitation.   - R/B/A discussed and pt agrees to proceed with surgery.   - IOL options discussed according to patient's goals and concomitant ocular pathology; and pt content with monofocal lens.    - Target: plano.      DIboo 21.5 OS    Return POD1

## 2024-04-01 ENCOUNTER — OFFICE VISIT (OUTPATIENT)
Dept: INTERNAL MEDICINE | Facility: CLINIC | Age: 69
DRG: 809 | End: 2024-04-01
Payer: MEDICARE

## 2024-04-01 ENCOUNTER — TELEPHONE (OUTPATIENT)
Dept: INTERNAL MEDICINE | Facility: CLINIC | Age: 69
End: 2024-04-01
Payer: MEDICARE

## 2024-04-01 ENCOUNTER — HOSPITAL ENCOUNTER (INPATIENT)
Facility: HOSPITAL | Age: 69
LOS: 1 days | Discharge: HOME | DRG: 809 | End: 2024-04-03
Attending: STUDENT IN AN ORGANIZED HEALTH CARE EDUCATION/TRAINING PROGRAM | Admitting: STUDENT IN AN ORGANIZED HEALTH CARE EDUCATION/TRAINING PROGRAM
Payer: MEDICARE

## 2024-04-01 ENCOUNTER — TELEPHONE (OUTPATIENT)
Dept: CARDIOLOGY | Facility: CLINIC | Age: 69
End: 2024-04-01
Payer: MEDICARE

## 2024-04-01 VITALS
DIASTOLIC BLOOD PRESSURE: 62 MMHG | WEIGHT: 191.5 LBS | HEART RATE: 76 BPM | BODY MASS INDEX: 25.38 KG/M2 | HEIGHT: 73 IN | OXYGEN SATURATION: 96 % | SYSTOLIC BLOOD PRESSURE: 138 MMHG

## 2024-04-01 DIAGNOSIS — Z94.0 S/P KIDNEY TRANSPLANT: ICD-10-CM

## 2024-04-01 DIAGNOSIS — E11.3393 TYPE 2 DIABETES MELLITUS WITH BOTH EYES AFFECTED BY MODERATE NONPROLIFERATIVE RETINOPATHY WITHOUT MACULAR EDEMA, WITHOUT LONG-TERM CURRENT USE OF INSULIN: ICD-10-CM

## 2024-04-01 DIAGNOSIS — Z79.01 ANTICOAGULANT LONG-TERM USE: ICD-10-CM

## 2024-04-01 DIAGNOSIS — N18.31 STAGE 3A CHRONIC KIDNEY DISEASE: ICD-10-CM

## 2024-04-01 DIAGNOSIS — I50.32 CHRONIC DIASTOLIC CONGESTIVE HEART FAILURE: ICD-10-CM

## 2024-04-01 DIAGNOSIS — I10 PRIMARY HYPERTENSION: ICD-10-CM

## 2024-04-01 DIAGNOSIS — Z29.89 PROPHYLACTIC IMMUNOTHERAPY: ICD-10-CM

## 2024-04-01 DIAGNOSIS — I48.0 PAROXYSMAL A-FIB: ICD-10-CM

## 2024-04-01 DIAGNOSIS — I10 PRIMARY HYPERTENSION: Primary | ICD-10-CM

## 2024-04-01 DIAGNOSIS — D69.6 THROMBOCYTOPENIA, UNSPECIFIED: ICD-10-CM

## 2024-04-01 DIAGNOSIS — D61.818 PANCYTOPENIA: ICD-10-CM

## 2024-04-01 DIAGNOSIS — Z91.89 AT RISK FOR LONG QT SYNDROME: ICD-10-CM

## 2024-04-01 DIAGNOSIS — Z94.0 KIDNEY REPLACED BY TRANSPLANT: ICD-10-CM

## 2024-04-01 DIAGNOSIS — D64.9 SYMPTOMATIC ANEMIA: Primary | ICD-10-CM

## 2024-04-01 DIAGNOSIS — Z12.5 SPECIAL SCREENING FOR MALIGNANT NEOPLASM OF PROSTATE: ICD-10-CM

## 2024-04-01 DIAGNOSIS — E78.2 MIXED HYPERLIPIDEMIA: ICD-10-CM

## 2024-04-01 DIAGNOSIS — Z79.899 IMMUNOCOMPROMISED STATE DUE TO DRUG THERAPY: ICD-10-CM

## 2024-04-01 DIAGNOSIS — D84.821 IMMUNOCOMPROMISED STATE DUE TO DRUG THERAPY: ICD-10-CM

## 2024-04-01 DIAGNOSIS — E78.5 HYPERLIPIDEMIA, UNSPECIFIED HYPERLIPIDEMIA TYPE: ICD-10-CM

## 2024-04-01 DIAGNOSIS — A04.8 H. PYLORI INFECTION: ICD-10-CM

## 2024-04-01 DIAGNOSIS — R42 DIZZINESS: ICD-10-CM

## 2024-04-01 DIAGNOSIS — D84.9 IMMUNODEFICIENCY, UNSPECIFIED: ICD-10-CM

## 2024-04-01 LAB
ABO + RH BLD: NORMAL
ALBUMIN SERPL BCP-MCNC: 3.3 G/DL (ref 3.5–5.2)
ALP SERPL-CCNC: 47 U/L (ref 55–135)
ALT SERPL W/O P-5'-P-CCNC: <5 U/L (ref 10–44)
ANION GAP SERPL CALC-SCNC: 8 MMOL/L (ref 8–16)
ANISOCYTOSIS BLD QL SMEAR: ABNORMAL
APTT PPP: 27.3 SEC (ref 21–32)
AST SERPL-CCNC: 11 U/L (ref 10–40)
BASOPHILS # BLD AUTO: 0.01 K/UL (ref 0–0.2)
BASOPHILS NFR BLD: 0.3 % (ref 0–1.9)
BILIRUB SERPL-MCNC: 0.3 MG/DL (ref 0.1–1)
BLD GP AB SCN CELLS X3 SERPL QL: NORMAL
BLD PROD TYP BPU: NORMAL
BLD PROD TYP BPU: NORMAL
BLOOD UNIT EXPIRATION DATE: NORMAL
BLOOD UNIT EXPIRATION DATE: NORMAL
BLOOD UNIT TYPE CODE: 5100
BLOOD UNIT TYPE CODE: 5100
BLOOD UNIT TYPE: NORMAL
BLOOD UNIT TYPE: NORMAL
BUN SERPL-MCNC: 32 MG/DL (ref 8–23)
CALCIUM SERPL-MCNC: 8.9 MG/DL (ref 8.7–10.5)
CHLORIDE SERPL-SCNC: 101 MMOL/L (ref 95–110)
CO2 SERPL-SCNC: 22 MMOL/L (ref 23–29)
CODING SYSTEM: NORMAL
CODING SYSTEM: NORMAL
CREAT SERPL-MCNC: 1.7 MG/DL (ref 0.5–1.4)
CROSSMATCH INTERPRETATION: NORMAL
CROSSMATCH INTERPRETATION: NORMAL
DACRYOCYTES BLD QL SMEAR: ABNORMAL
DIFFERENTIAL METHOD BLD: ABNORMAL
DISPENSE STATUS: NORMAL
DISPENSE STATUS: NORMAL
EOSINOPHIL # BLD AUTO: 0 K/UL (ref 0–0.5)
EOSINOPHIL NFR BLD: 0.6 % (ref 0–8)
ERYTHROCYTE [DISTWIDTH] IN BLOOD BY AUTOMATED COUNT: 16.4 % (ref 11.5–14.5)
EST. GFR  (NO RACE VARIABLE): 43.9 ML/MIN/1.73 M^2
FERRITIN SERPL-MCNC: 816 NG/ML (ref 20–300)
FOLATE SERPL-MCNC: 11.4 NG/ML (ref 4–24)
GLUCOSE SERPL-MCNC: 208 MG/DL (ref 70–110)
HAPTOGLOB SERPL-MCNC: 178 MG/DL (ref 30–250)
HCT VFR BLD AUTO: 20.1 % (ref 40–54)
HGB BLD-MCNC: 5.7 G/DL (ref 14–18)
HIV 1+2 AB+HIV1 P24 AG SERPL QL IA: NORMAL
IMM GRANULOCYTES # BLD AUTO: 0.07 K/UL (ref 0–0.04)
IMM GRANULOCYTES NFR BLD AUTO: 2 % (ref 0–0.5)
INR PPP: 1.1 (ref 0.8–1.2)
IRON SERPL-MCNC: 117 UG/DL (ref 45–160)
LDH SERPL L TO P-CCNC: 206 U/L (ref 110–260)
LYMPHOCYTES # BLD AUTO: 0.2 K/UL (ref 1–4.8)
LYMPHOCYTES NFR BLD: 6.4 % (ref 18–48)
MCH RBC QN AUTO: 22.5 PG (ref 27–31)
MCHC RBC AUTO-ENTMCNC: 28.4 G/DL (ref 32–36)
MCV RBC AUTO: 79 FL (ref 82–98)
MONOCYTES # BLD AUTO: 0.6 K/UL (ref 0.3–1)
MONOCYTES NFR BLD: 16.5 % (ref 4–15)
NEUTROPHILS # BLD AUTO: 2.7 K/UL (ref 1.8–7.7)
NEUTROPHILS NFR BLD: 74.2 % (ref 38–73)
NRBC BLD-RTO: 0 /100 WBC
NUM UNITS TRANS PACKED RBC: NORMAL
NUM UNITS TRANS PACKED RBC: NORMAL
OHS QRS DURATION: 102 MS
OHS QTC CALCULATION: 407 MS
OVALOCYTES BLD QL SMEAR: ABNORMAL
PLATELET # BLD AUTO: 136 K/UL (ref 150–450)
PLATELET BLD QL SMEAR: ABNORMAL
PMV BLD AUTO: ABNORMAL FL (ref 9.2–12.9)
POIKILOCYTOSIS BLD QL SMEAR: SLIGHT
POLYCHROMASIA BLD QL SMEAR: ABNORMAL
POTASSIUM SERPL-SCNC: 4.3 MMOL/L (ref 3.5–5.1)
PROT SERPL-MCNC: 6.5 G/DL (ref 6–8.4)
PROTHROMBIN TIME: 11.9 SEC (ref 9–12.5)
RBC # BLD AUTO: 2.53 M/UL (ref 4.6–6.2)
RETICS/RBC NFR AUTO: 0.6 % (ref 0.4–2)
SATURATED IRON: 56 % (ref 20–50)
SCHISTOCYTES BLD QL SMEAR: ABNORMAL
SCHISTOCYTES BLD QL SMEAR: PRESENT
SODIUM SERPL-SCNC: 131 MMOL/L (ref 136–145)
SPECIMEN OUTDATE: NORMAL
TOTAL IRON BINDING CAPACITY: 209 UG/DL (ref 250–450)
TRANSFERRIN SERPL-MCNC: 141 MG/DL (ref 200–375)
TSH SERPL DL<=0.005 MIU/L-ACNC: 0.92 UIU/ML (ref 0.4–4)
VIT B12 SERPL-MCNC: 464 PG/ML (ref 210–950)
WBC # BLD AUTO: 3.57 K/UL (ref 3.9–12.7)

## 2024-04-01 PROCEDURE — 93005 ELECTROCARDIOGRAM TRACING: CPT

## 2024-04-01 PROCEDURE — 84156 ASSAY OF PROTEIN URINE: CPT | Performed by: NURSE PRACTITIONER

## 2024-04-01 PROCEDURE — 82570 ASSAY OF URINE CREATININE: CPT | Performed by: NURSE PRACTITIONER

## 2024-04-01 PROCEDURE — 30233N0 TRANSFUSION OF AUTOLOGOUS RED BLOOD CELLS INTO PERIPHERAL VEIN, PERCUTANEOUS APPROACH: ICD-10-PCS | Performed by: STUDENT IN AN ORGANIZED HEALTH CARE EDUCATION/TRAINING PROGRAM

## 2024-04-01 PROCEDURE — G0378 HOSPITAL OBSERVATION PER HR: HCPCS

## 2024-04-01 PROCEDURE — 99285 EMERGENCY DEPT VISIT HI MDM: CPT | Mod: 25

## 2024-04-01 PROCEDURE — 80053 COMPREHEN METABOLIC PANEL: CPT | Mod: 91 | Performed by: STUDENT IN AN ORGANIZED HEALTH CARE EDUCATION/TRAINING PROGRAM

## 2024-04-01 PROCEDURE — 85045 AUTOMATED RETICULOCYTE COUNT: CPT | Performed by: STUDENT IN AN ORGANIZED HEALTH CARE EDUCATION/TRAINING PROGRAM

## 2024-04-01 PROCEDURE — 84300 ASSAY OF URINE SODIUM: CPT | Performed by: NURSE PRACTITIONER

## 2024-04-01 PROCEDURE — 93010 ELECTROCARDIOGRAM REPORT: CPT | Mod: ,,, | Performed by: INTERNAL MEDICINE

## 2024-04-01 PROCEDURE — 82728 ASSAY OF FERRITIN: CPT | Performed by: STUDENT IN AN ORGANIZED HEALTH CARE EDUCATION/TRAINING PROGRAM

## 2024-04-01 PROCEDURE — 84443 ASSAY THYROID STIM HORMONE: CPT | Performed by: STUDENT IN AN ORGANIZED HEALTH CARE EDUCATION/TRAINING PROGRAM

## 2024-04-01 PROCEDURE — 86901 BLOOD TYPING SEROLOGIC RH(D): CPT | Performed by: STUDENT IN AN ORGANIZED HEALTH CARE EDUCATION/TRAINING PROGRAM

## 2024-04-01 PROCEDURE — 36430 TRANSFUSION BLD/BLD COMPNT: CPT

## 2024-04-01 PROCEDURE — 3008F BODY MASS INDEX DOCD: CPT | Mod: CPTII,S$GLB,, | Performed by: INTERNAL MEDICINE

## 2024-04-01 PROCEDURE — 82607 VITAMIN B-12: CPT | Performed by: STUDENT IN AN ORGANIZED HEALTH CARE EDUCATION/TRAINING PROGRAM

## 2024-04-01 PROCEDURE — 83010 ASSAY OF HAPTOGLOBIN QUANT: CPT | Performed by: STUDENT IN AN ORGANIZED HEALTH CARE EDUCATION/TRAINING PROGRAM

## 2024-04-01 PROCEDURE — 99999 PR PBB SHADOW E&M-EST. PATIENT-LVL V: CPT | Mod: PBBFAC,,, | Performed by: INTERNAL MEDICINE

## 2024-04-01 PROCEDURE — 4010F ACE/ARB THERAPY RXD/TAKEN: CPT | Mod: CPTII,S$GLB,, | Performed by: INTERNAL MEDICINE

## 2024-04-01 PROCEDURE — 83540 ASSAY OF IRON: CPT | Performed by: STUDENT IN AN ORGANIZED HEALTH CARE EDUCATION/TRAINING PROGRAM

## 2024-04-01 PROCEDURE — 3052F HG A1C>EQUAL 8.0%<EQUAL 9.0%: CPT | Mod: CPTII,S$GLB,, | Performed by: INTERNAL MEDICINE

## 2024-04-01 PROCEDURE — 1101F PT FALLS ASSESS-DOCD LE1/YR: CPT | Mod: CPTII,S$GLB,, | Performed by: INTERNAL MEDICINE

## 2024-04-01 PROCEDURE — 99214 OFFICE O/P EST MOD 30 MIN: CPT | Mod: S$GLB,,, | Performed by: INTERNAL MEDICINE

## 2024-04-01 PROCEDURE — 3075F SYST BP GE 130 - 139MM HG: CPT | Mod: CPTII,S$GLB,, | Performed by: INTERNAL MEDICINE

## 2024-04-01 PROCEDURE — P9016 RBC LEUKOCYTES REDUCED: HCPCS | Performed by: STUDENT IN AN ORGANIZED HEALTH CARE EDUCATION/TRAINING PROGRAM

## 2024-04-01 PROCEDURE — 63600175 PHARM REV CODE 636 W HCPCS: Performed by: NURSE PRACTITIONER

## 2024-04-01 PROCEDURE — 1126F AMNT PAIN NOTED NONE PRSNT: CPT | Mod: CPTII,S$GLB,, | Performed by: INTERNAL MEDICINE

## 2024-04-01 PROCEDURE — 86920 COMPATIBILITY TEST SPIN: CPT | Performed by: STUDENT IN AN ORGANIZED HEALTH CARE EDUCATION/TRAINING PROGRAM

## 2024-04-01 PROCEDURE — 83935 ASSAY OF URINE OSMOLALITY: CPT | Performed by: NURSE PRACTITIONER

## 2024-04-01 PROCEDURE — 1159F MED LIST DOCD IN RCRD: CPT | Mod: CPTII,S$GLB,, | Performed by: INTERNAL MEDICINE

## 2024-04-01 PROCEDURE — 85610 PROTHROMBIN TIME: CPT | Performed by: STUDENT IN AN ORGANIZED HEALTH CARE EDUCATION/TRAINING PROGRAM

## 2024-04-01 PROCEDURE — 87389 HIV-1 AG W/HIV-1&-2 AB AG IA: CPT | Performed by: PHYSICIAN ASSISTANT

## 2024-04-01 PROCEDURE — 1160F RVW MEDS BY RX/DR IN RCRD: CPT | Mod: CPTII,S$GLB,, | Performed by: INTERNAL MEDICINE

## 2024-04-01 PROCEDURE — 85025 COMPLETE CBC W/AUTO DIFF WBC: CPT | Mod: 91 | Performed by: STUDENT IN AN ORGANIZED HEALTH CARE EDUCATION/TRAINING PROGRAM

## 2024-04-01 PROCEDURE — 83615 LACTATE (LD) (LDH) ENZYME: CPT | Performed by: STUDENT IN AN ORGANIZED HEALTH CARE EDUCATION/TRAINING PROGRAM

## 2024-04-01 PROCEDURE — 82272 OCCULT BLD FECES 1-3 TESTS: CPT

## 2024-04-01 PROCEDURE — 3078F DIAST BP <80 MM HG: CPT | Mod: CPTII,S$GLB,, | Performed by: INTERNAL MEDICINE

## 2024-04-01 PROCEDURE — 85730 THROMBOPLASTIN TIME PARTIAL: CPT | Performed by: STUDENT IN AN ORGANIZED HEALTH CARE EDUCATION/TRAINING PROGRAM

## 2024-04-01 PROCEDURE — 82746 ASSAY OF FOLIC ACID SERUM: CPT | Performed by: STUDENT IN AN ORGANIZED HEALTH CARE EDUCATION/TRAINING PROGRAM

## 2024-04-01 PROCEDURE — 3288F FALL RISK ASSESSMENT DOCD: CPT | Mod: CPTII,S$GLB,, | Performed by: INTERNAL MEDICINE

## 2024-04-01 PROCEDURE — 25000003 PHARM REV CODE 250: Performed by: NURSE PRACTITIONER

## 2024-04-01 PROCEDURE — 81001 URINALYSIS AUTO W/SCOPE: CPT | Performed by: NURSE PRACTITIONER

## 2024-04-01 RX ORDER — CLARITHROMYCIN 500 MG/1
500 TABLET, FILM COATED ORAL EVERY 12 HOURS
Status: DISCONTINUED | OUTPATIENT
Start: 2024-04-01 | End: 2024-04-02

## 2024-04-01 RX ORDER — ATORVASTATIN CALCIUM 40 MG/1
40 TABLET, FILM COATED ORAL DAILY
Qty: 90 TABLET | Refills: 3 | Status: SHIPPED | OUTPATIENT
Start: 2024-04-01

## 2024-04-01 RX ORDER — TALC
6 POWDER (GRAM) TOPICAL NIGHTLY PRN
Status: DISCONTINUED | OUTPATIENT
Start: 2024-04-01 | End: 2024-04-03 | Stop reason: HOSPADM

## 2024-04-01 RX ORDER — GUAIFENESIN 100 MG/5ML
200 SOLUTION ORAL ONCE
Status: COMPLETED | OUTPATIENT
Start: 2024-04-01 | End: 2024-04-01

## 2024-04-01 RX ORDER — HYDROCHLOROTHIAZIDE 25 MG/1
25 TABLET ORAL DAILY
Status: DISCONTINUED | OUTPATIENT
Start: 2024-04-02 | End: 2024-04-02

## 2024-04-01 RX ORDER — AMOXICILLIN 500 MG/1
1000 CAPSULE ORAL EVERY 12 HOURS
Status: DISCONTINUED | OUTPATIENT
Start: 2024-04-01 | End: 2024-04-03 | Stop reason: HOSPADM

## 2024-04-01 RX ORDER — TACROLIMUS 0.5 MG/1
0.5 CAPSULE ORAL 2 TIMES DAILY
Status: DISCONTINUED | OUTPATIENT
Start: 2024-04-01 | End: 2024-04-03

## 2024-04-01 RX ORDER — SODIUM CHLORIDE 0.9 % (FLUSH) 0.9 %
10 SYRINGE (ML) INJECTION
Status: DISCONTINUED | OUTPATIENT
Start: 2024-04-01 | End: 2024-04-03 | Stop reason: HOSPADM

## 2024-04-01 RX ORDER — IBUPROFEN 200 MG
16 TABLET ORAL
Status: DISCONTINUED | OUTPATIENT
Start: 2024-04-01 | End: 2024-04-03 | Stop reason: HOSPADM

## 2024-04-01 RX ORDER — INSULIN ASPART 100 [IU]/ML
0-5 INJECTION, SOLUTION INTRAVENOUS; SUBCUTANEOUS
Status: DISCONTINUED | OUTPATIENT
Start: 2024-04-01 | End: 2024-04-03 | Stop reason: HOSPADM

## 2024-04-01 RX ORDER — BENZONATATE 100 MG/1
100 CAPSULE ORAL 3 TIMES DAILY PRN
Status: DISCONTINUED | OUTPATIENT
Start: 2024-04-01 | End: 2024-04-03 | Stop reason: HOSPADM

## 2024-04-01 RX ORDER — IBUPROFEN 200 MG
24 TABLET ORAL
Status: DISCONTINUED | OUTPATIENT
Start: 2024-04-01 | End: 2024-04-03 | Stop reason: HOSPADM

## 2024-04-01 RX ORDER — PANTOPRAZOLE SODIUM 40 MG/1
40 TABLET, DELAYED RELEASE ORAL 2 TIMES DAILY
Status: DISCONTINUED | OUTPATIENT
Start: 2024-04-01 | End: 2024-04-03 | Stop reason: HOSPADM

## 2024-04-01 RX ORDER — CARVEDILOL 25 MG/1
25 TABLET ORAL 2 TIMES DAILY
Qty: 180 TABLET | Refills: 3 | Status: ON HOLD | OUTPATIENT
Start: 2024-04-01 | End: 2024-05-03 | Stop reason: HOSPADM

## 2024-04-01 RX ORDER — FAMOTIDINE 20 MG/1
20 TABLET, FILM COATED ORAL NIGHTLY
Status: DISCONTINUED | OUTPATIENT
Start: 2024-04-01 | End: 2024-04-01

## 2024-04-01 RX ORDER — CARVEDILOL 25 MG/1
25 TABLET ORAL 2 TIMES DAILY
Status: DISCONTINUED | OUTPATIENT
Start: 2024-04-01 | End: 2024-04-03 | Stop reason: HOSPADM

## 2024-04-01 RX ORDER — ATORVASTATIN CALCIUM 40 MG/1
40 TABLET, FILM COATED ORAL DAILY
Status: DISCONTINUED | OUTPATIENT
Start: 2024-04-02 | End: 2024-04-03 | Stop reason: HOSPADM

## 2024-04-01 RX ORDER — CARVEDILOL 12.5 MG/1
25 TABLET ORAL 2 TIMES DAILY
Status: DISCONTINUED | OUTPATIENT
Start: 2024-04-01 | End: 2024-04-01

## 2024-04-01 RX ORDER — GLUCAGON 1 MG
1 KIT INJECTION
Status: DISCONTINUED | OUTPATIENT
Start: 2024-04-01 | End: 2024-04-03 | Stop reason: HOSPADM

## 2024-04-01 RX ORDER — GABAPENTIN 300 MG/1
300 CAPSULE ORAL 3 TIMES DAILY
Status: DISCONTINUED | OUTPATIENT
Start: 2024-04-01 | End: 2024-04-03 | Stop reason: HOSPADM

## 2024-04-01 RX ORDER — VALSARTAN 40 MG/1
40 TABLET ORAL DAILY
Qty: 90 TABLET | Refills: 3 | Status: ON HOLD | OUTPATIENT
Start: 2024-04-01 | End: 2024-04-03 | Stop reason: HOSPADM

## 2024-04-01 RX ORDER — TALC
6 POWDER (GRAM) TOPICAL NIGHTLY PRN
Status: DISCONTINUED | OUTPATIENT
Start: 2024-04-01 | End: 2024-04-01

## 2024-04-01 RX ORDER — HYDROCODONE BITARTRATE AND ACETAMINOPHEN 500; 5 MG/1; MG/1
TABLET ORAL
Status: DISCONTINUED | OUTPATIENT
Start: 2024-04-01 | End: 2024-04-03 | Stop reason: HOSPADM

## 2024-04-01 RX ORDER — GABAPENTIN 300 MG/1
600 CAPSULE ORAL NIGHTLY
Status: DISCONTINUED | OUTPATIENT
Start: 2024-04-01 | End: 2024-04-03 | Stop reason: HOSPADM

## 2024-04-01 RX ORDER — PREDNISONE 5 MG/1
5 TABLET ORAL DAILY
Status: DISCONTINUED | OUTPATIENT
Start: 2024-04-02 | End: 2024-04-03 | Stop reason: HOSPADM

## 2024-04-01 RX ORDER — VALSARTAN 40 MG/1
40 TABLET ORAL DAILY
Status: DISCONTINUED | OUTPATIENT
Start: 2024-04-01 | End: 2024-04-02

## 2024-04-01 RX ADMIN — CARVEDILOL 25 MG: 25 TABLET, FILM COATED ORAL at 09:04

## 2024-04-01 RX ADMIN — GABAPENTIN 300 MG: 300 CAPSULE ORAL at 09:04

## 2024-04-01 RX ADMIN — GUAIFENESIN 200 MG: 200 SOLUTION ORAL at 10:04

## 2024-04-01 RX ADMIN — PANTOPRAZOLE SODIUM 40 MG: 40 TABLET, DELAYED RELEASE ORAL at 09:04

## 2024-04-01 RX ADMIN — GABAPENTIN 600 MG: 300 CAPSULE ORAL at 09:04

## 2024-04-01 RX ADMIN — AMOXICILLIN 1000 MG: 500 CAPSULE ORAL at 09:04

## 2024-04-01 RX ADMIN — CLARITHROMYCIN 500 MG: 500 TABLET, FILM COATED ORAL at 10:04

## 2024-04-01 RX ADMIN — TACROLIMUS 0.5 MG: 0.5 CAPSULE ORAL at 09:04

## 2024-04-01 RX ADMIN — VALSARTAN 40 MG: 40 TABLET, FILM COATED ORAL at 09:04

## 2024-04-01 NOTE — ASSESSMENT & PLAN NOTE
Patient is identified as having Diastolic (HFpEF) heart failure that is Chronic. CHF is currently controlled. Latest ECHO performed and demonstrates- Results for orders placed during the hospital encounter of 07/10/22    Echo Saline Bubble? No    Interpretation Summary  · The left ventricle is normal in size with eccentric hypertrophy and normal systolic function. The estimated ejection fraction is 65%.  · Normal right ventricular size with normal right ventricular systolic function.  · Left ventricular diastolic dysfunction.  · Biatrial enlargement.  · Mild aortic regurgitation.  · PA systolic pressure is at elast 39 mmHg. The IVC is not visualized.  . Continue Beta Blocker, ACE/ARB, and Aldactone and monitor clinical status closely. Monitor on telemetry. Patient is off CHF pathway.  Monitor strict Is&Os and daily weights. Cardiology has not been consulted. Continue to stress to patient importance of self efficacy and  on diet for CHF.

## 2024-04-01 NOTE — ASSESSMENT & PLAN NOTE
Creatine stable for now. BMP reviewed- noted Estimated Creatinine Clearance: 48.3 mL/min (A) (based on SCr of 1.7 mg/dL (H)). according to latest data. Based on current GFR, CKD stage is stage 3 - GFR 30-59.  Monitor UOP and serial BMP and adjust therapy as needed. Renally dose meds. Avoid nephrotoxic medications and procedures.

## 2024-04-01 NOTE — PROGRESS NOTES
"Subjective:       Patient ID: Ravi Zamorano is a 68 y.o. male.    Chief Complaint: Follow-up (BP)    HPI  68 y.o. male here for follow up of    HTN  BP Readings from Last 5 Encounters:   04/01/24 138/62   03/14/24 124/62   03/13/24 (!) 156/75   03/05/24 (!) 144/70   02/27/24 (!) 171/74      Carvedilol 25mg bid  Hctz 25mg  Spironolactone 25mg - stopped a week ago. His BP was dropping, getting lightheaded. He is still having these symptoms.  Home BP readings 140s-160s/70s-80s.  Valsartan 320mg was on his list but he hasn't taken in months.     Sick for a week, coughing still.     DM2  Lab Results   Component Value Date    HGBA1C 8.3 (H) 03/11/2024    HGBA1C 8.4 (H) 11/14/2023    HGBA1C 7.5 (H) 06/07/2023   NP Lopez  Lantus 20 units in the morning  Novolog 16-10-10 units before meals   Plus scale 180-230+2 ,etc  Cut in 1/2 if sugar is < 120  Jardiance 10 mg daily  Gin 3     CKD 3 - gfr 43  S/p kidney transplant 2016   Program 0.5mg bid, cellcept 1000mg bid, pred 5 mg daily     H pylori at beginning of month  Treated w biaxin, pantoprazole    Thrombocytopenia   Plt 130 and stable  Review of Systems   Constitutional:  Negative for fever.   Respiratory:  Negative for shortness of breath.    Cardiovascular:  Negative for chest pain.   Musculoskeletal: Negative.    Skin: Negative.        Objective:   /62 (BP Location: Right arm, Patient Position: Sitting)   Pulse 76   Ht 6' 1" (1.854 m)   Wt 86.8 kg (191 lb 7.5 oz)   SpO2 96%   BMI 25.26 kg/m²      Physical Exam  Constitutional:       General: He is not in acute distress.     Appearance: He is well-developed. He is not diaphoretic.   HENT:      Head: Normocephalic and atraumatic.   Cardiovascular:      Rate and Rhythm: Normal rate.   Pulmonary:      Effort: Pulmonary effort is normal. No respiratory distress.   Skin:     General: Skin is warm and dry.   Neurological:      Mental Status: He is alert and oriented to person, place, and time.   Psychiatric:    "      Behavior: Behavior normal.         Assessment:       1. Primary hypertension    2. Immunodeficiency, unspecified    3. Thrombocytopenia, unspecified    4. Hyperlipidemia, unspecified hyperlipidemia type    5. Type 2 diabetes mellitus with both eyes affected by moderate nonproliferative retinopathy without macular edema, without long-term current use of insulin    6. Special screening for malignant neoplasm of prostate        Plan:       Primary hypertension  -     carvediloL (COREG) 25 MG tablet; Take 1 tablet (25 mg total) by mouth 2 (two) times daily.  Dispense: 180 tablet; Refill: 3  -     COMPREHENSIVE METABOLIC PANEL; Future; Expected date: 04/01/2024  -     CBC Auto Differential; Future; Expected date: 04/01/2024  -     valsartan (DIOVAN) 40 MG tablet; Take 1 tablet (40 mg total) by mouth once daily.  Dispense: 90 tablet; Refill: 3    Immunodeficiency, unspecified    Thrombocytopenia, unspecified  Check cbc as above    Hyperlipidemia, unspecified hyperlipidemia type  -     atorvastatin (LIPITOR) 40 MG tablet; Take 1 tablet (40 mg total) by mouth once daily.  Dispense: 90 tablet; Refill: 3    Type 2 diabetes mellitus with both eyes affected by moderate nonproliferative retinopathy without macular edema, without long-term current use of insulin    Special screening for malignant neoplasm of prostate  -     PSA, Screening; Future; Expected date: 04/01/2024          Health Maintenance Due   Topic    Diabetes Urine Screening       Rsv, covid  Labs  Restart low dose losartan

## 2024-04-01 NOTE — ED TRIAGE NOTES
Ravi Zamorano, a 68 y.o. male presents to the ED w/ complaint of abnormal labwork drawn today and was instructed to return to ED for blood transfusion.    Triage note:  Chief Complaint   Patient presents with    Abnormal Lab     Hgb 6, had lab drawn today , been dizzy     Review of patient's allergies indicates:   Allergen Reactions    Nifedipine Other (See Comments)     Gingival hyperplasia     Past Medical History:   Diagnosis Date    Anxiety 7/29/2014    Arthritis     Bilateral diabetic retinopathy 2017    CKD (chronic kidney disease) stage 2, GFR 60-89 ml/min 12/28/2016    CKD (chronic kidney disease) stage 4, GFR 15-29 ml/min 7/29/2014    Colon polyps 2014    Depression - situational 7/29/2014    Diabetes mellitus     Diabetes type 2 since 2000 7/29/2014    Diabetic neuropathy 7/29/2014    History of cardioversion 10/3/2019    History of hepatitis C, s/p successful Rx w/ SVR24 - 9/2017 7/29/2014    Genotype 1a, treatment naive 10/2014 liver biopsy - grade 1 / stage 1 Completed Harvoni w/ SVR    Hyperlipidemia 7/29/2014    Hypertension     Neuropathy     Organ transplant candidate 7/29/2014    Pancreatitis     S/P cadaveric kidney transplant 11/5/2016. ESRD secondary to HTN/DMII 11/5/2016    Stage 3a chronic kidney disease 12/28/2016    Type 2 diabetes mellitus with stage 3a chronic kidney disease, with long-term current use of insulin 7/29/2014

## 2024-04-01 NOTE — ASSESSMENT & PLAN NOTE
Patient was found to have thrombocytopenia, the likely etiology is primary from bone marrow suppression, will monitor the platelets Daily. Will transfuse if platelet count is <50k (if undergoing surgical procedure or have active bleeding). Hold DVT prophylaxis if platelets are <50k. The patient's platelet results have been reviewed and are listed below.  Recent Labs   Lab 04/01/24  1708   *

## 2024-04-01 NOTE — SUBJECTIVE & OBJECTIVE
Past Medical History:   Diagnosis Date    Anxiety 7/29/2014    Arthritis     Bilateral diabetic retinopathy 2017    CKD (chronic kidney disease) stage 2, GFR 60-89 ml/min 12/28/2016    CKD (chronic kidney disease) stage 4, GFR 15-29 ml/min 7/29/2014    Colon polyps 2014    Depression - situational 7/29/2014    Diabetes mellitus     Diabetes type 2 since 2000 7/29/2014    Diabetic neuropathy 7/29/2014    History of cardioversion 10/3/2019    History of hepatitis C, s/p successful Rx w/ SVR24 - 9/2017 7/29/2014    Genotype 1a, treatment naive 10/2014 liver biopsy - grade 1 / stage 1 Completed Harvoni w/ SVR    Hyperlipidemia 7/29/2014    Hypertension     Neuropathy     Organ transplant candidate 7/29/2014    Pancreatitis     S/P cadaveric kidney transplant 11/5/2016. ESRD secondary to HTN/DMII 11/5/2016    Stage 3a chronic kidney disease 12/28/2016    Type 2 diabetes mellitus with stage 3a chronic kidney disease, with long-term current use of insulin 7/29/2014       Past Surgical History:   Procedure Laterality Date    APPENDECTOMY      CATARACT EXTRACTION W/  INTRAOCULAR LENS IMPLANT Right 3/13/2024    Procedure: EXTRACTION, CATARACT, WITH IOL INSERTION;  Surgeon: Jennifer Palacio MD;  Location: [facility] OR;  Service: Ophthalmology;  Laterality: Right;    COLONOSCOPY N/A 12/21/2020    Procedure: COLONOSCOPY;  Surgeon: Tico Bell MD;  Location: [facility] ENDO ([address] [place] [address]);  Service: Endoscopy;  Laterality: N/A;  ok to hold eliquis per Dr. Valadez, see telephone encounter 11/13/2020-MS  covid test 12/18 [place]    KIDNEY TRANSPLANT      TRANSESOPHAGEAL ECHOCARDIOGRAPHY N/A 8/26/2019    Procedure: ECHOCARDIOGRAM, TRANSESOPHAGEAL;  Surgeon: Dolores Diagnostic Provider;  Location: [facility] EP LAB;  Service: Cardiology;  Laterality: N/A;    TREATMENT OF CARDIAC ARRHYTHMIA N/A 8/26/2019    Procedure: CARDIOVERSION;  Surgeon: Bronson Bowden MD;  Location: [facility] EP LAB;  Service: Cardiology;  Laterality: N/A;  AF, FELICIANO, DCCV, MAC, GP, 3  PREP       Review of patient's allergies indicates:   Allergen Reactions    Nifedipine Other (See Comments)     Gingival hyperplasia       Current Facility-Administered Medications on File Prior to Encounter   Medication    balanced salt irrigation intra-ocular solution 1 drop    phenylephrine HCL 10% ophthalmic solution 1 drop    proparacaine 0.5 % ophthalmic solution 1 drop    sodium chloride 0.9% flush 10 mL    TETRAcaine HCl (PF) 0.5 % Drop 1 drop     Current Outpatient Medications on File Prior to Encounter   Medication Sig    apixaban (ELIQUIS) 5 mg Tab Take 1 tablet (5 mg total) by mouth 2 (two) times daily.    aspirin 81 MG Chew Take 81 mg by mouth once daily.    atorvastatin (LIPITOR) 40 MG tablet Take 1 tablet (40 mg total) by mouth once daily.    blood-glucose sensor Kayla Gin 3, use as directed, change every 14 days , e 11.65    carvediloL (COREG) 25 MG tablet Take 1 tablet (25 mg total) by mouth 2 (two) times daily.    diphenoxylate-atropine 2.5-0.025 mg (LOMOTIL) 2.5-0.025 mg per tablet Take 1 tablet by mouth 4 (four) times daily as needed for Diarrhea.    empagliflozin (JARDIANCE) 10 mg tablet Take 1 tablet (10 mg total) by mouth once daily.    famotidine (PEPCID) 20 MG tablet Take 1 tablet (20 mg total) by mouth every evening.    gabapentin (NEURONTIN) 300 MG capsule Take 1 capsule by mouth in the morning, 1 capsule midday, and 3 capsules at night.    hydroCHLOROthiazide (HYDRODIURIL) 25 MG tablet Take 1 tablet (25 mg total) by mouth once daily.    insulin (LANTUS SOLOSTAR U-100 INSULIN) glargine 100 units/mL SubQ pen Inject 20 Units into the skin every morning.    insulin aspart U-100 (NOVOLOG) 100 unit/mL (3 mL) InPn pen Inject 16 units w/ breakfast, 10 units at lunch and dinner scale 180-230+2, 231-280+4, 281-330+6, 331-380+8, >380+10.  *Max daily 66 units.*    meclizine (ANTIVERT) 25 mg tablet Take 1 tablet (25 mg total) by mouth 3 (three) times daily as needed for Dizziness.    melatonin 5 mg  "Tab Take 1 tablet (5 mg total) by mouth every evening. (Patient taking differently: Take 1 tablet by mouth as needed.)    mycophenolate (CELLCEPT) 250 mg Cap Take 4 capsules (1,000 mg total) by mouth 2 (two) times daily.    pantoprazole (PROTONIX) 40 MG tablet Take 1 tablet (40 mg total) by mouth once daily. for 14 days    pen needle, diabetic (BD ULTRA-FINE LO PEN NEEDLE) 32 gauge x 5/32" Ndle USE TO ADMINISTER INSULIN 4 TIMES DAILY.    pen needle, diabetic (BD ULTRA-FINE LO PEN NEEDLE) 32 gauge x 5/32" Ndle use four times a day    prednisolon/gatiflox/bromfenac (PREDNISOL ACE-GATIFLOX-BROMFEN) 1-0.5-0.075 % DrpS Apply 1 drop to eye 3 (three) times daily.    predniSONE (DELTASONE) 5 MG tablet Take 1 tablet (5 mg total) by mouth once daily.    tacrolimus (PROGRAF) 0.5 MG Cap Take 1 capsule (0.5 mg total) by mouth every 12 (twelve) hours.    valsartan (DIOVAN) 40 MG tablet Take 1 tablet (40 mg total) by mouth once daily.    [DISCONTINUED] atorvastatin (LIPITOR) 40 MG tablet Take 1 tablet (40 mg total) by mouth once daily. (Patient taking differently: Take by mouth once daily.)    [DISCONTINUED] benzonatate (TESSALON) 100 MG capsule Take 1 capsule (100 mg total) by mouth 3 (three) times daily as needed for Cough.    [DISCONTINUED] carvediloL (COREG) 25 MG tablet Take 1 tablet (25 mg total) by mouth 2 (two) times daily.    [DISCONTINUED] insulin lispro (HUMALOG KWIKPEN INSULIN) 100 unit/mL pen Inject 18 units w/ breakfast, 16 units w/ lunch and dinner plus scale 150-200 +2, 201-250 +4, 251-300 +6, 301-350 +8.    [DISCONTINUED] lancets (ONETOUCH DELICA PLUS LANCET) 33 gauge Misc USE TO CHECK BLOOD GLUCOSE FOUR TIMES DAILY    [DISCONTINUED] spironolactone (ALDACTONE) 25 MG tablet Take 1 tablet (25 mg total) by mouth once daily.     Family History       Problem Relation (Age of Onset)    Cancer Brother    Diabetes Mother    Heart disease Mother, Father    Hypertension Mother, Brother          Tobacco Use    Smoking " status: Former     Current packs/day: 0.00     Average packs/day: 0.5 packs/day for 32.0 years (16.0 ttl pk-yrs)     Types: Cigarettes     Start date: 1984     Quit date: 2016     Years since quittin.3    Smokeless tobacco: Never    Tobacco comments:     Pt reports that he quit in , but started up again in . pt reports he is currently working on quitting again   Substance and Sexual Activity    Alcohol use: Yes     Comment: Pt reports occasional beers on Sundays. Pt reports drinking daily prior to ESRD diagnosis.    Drug use: No    Sexual activity: Yes     Partners: Female     Review of Systems   Constitutional:  Positive for fatigue. Negative for appetite change, chills, diaphoresis and fever.   HENT:  Negative for congestion, rhinorrhea and sore throat.    Eyes:  Negative for photophobia and visual disturbance.   Respiratory:  Positive for cough. Negative for shortness of breath and wheezing.         +DOSS   Gastrointestinal:  Positive for diarrhea (improved). Negative for abdominal distention, abdominal pain, blood in stool, nausea and vomiting.   Genitourinary:  Negative for dysuria, frequency and hematuria.   Musculoskeletal:  Negative for back pain, myalgias and neck pain.   Skin:  Negative for color change, pallor, rash and wound.   Neurological:  Positive for dizziness and light-headedness. Negative for weakness and headaches.   Psychiatric/Behavioral:  Negative for confusion and hallucinations. The patient is not nervous/anxious.      Objective:     Vital Signs (Most Recent):  Temp: 98.3 °F (36.8 °C) (24 1547)  Pulse: 79 (24 1708)  Resp: 18 (24 170)  BP: (!) 153/70 (24 1708)  SpO2: 96 % (24 1708) Vital Signs (24h Range):  Temp:  [98.3 °F (36.8 °C)] 98.3 °F (36.8 °C)  Pulse:  [76-81] 79  Resp:  [18] 18  SpO2:  [96 %-100 %] 96 %  BP: (138-159)/(62-75) 153/70     Weight: 86.6 kg (191 lb)  Body mass index is 25.2 kg/m².     Physical Exam  Vitals and nursing  note reviewed.   Constitutional:       General: He is not in acute distress.     Appearance: He is not toxic-appearing or diaphoretic.   HENT:      Head: Normocephalic and atraumatic.      Nose: Nose normal.      Mouth/Throat:      Mouth: Mucous membranes are moist.   Eyes:      Pupils: Pupils are equal, round, and reactive to light.   Cardiovascular:      Rate and Rhythm: Normal rate. Rhythm irregularly irregular.      Pulses: Normal pulses.   Pulmonary:      Effort: Pulmonary effort is normal. No respiratory distress.      Breath sounds: No wheezing, rhonchi or rales.   Abdominal:      General: Bowel sounds are normal. There is no distension.      Palpations: Abdomen is soft.      Tenderness: There is no abdominal tenderness. There is no guarding.   Musculoskeletal:         General: Normal range of motion.      Cervical back: Normal range of motion.      Right lower leg: No edema.      Left lower leg: No edema.   Skin:     General: Skin is warm and dry.      Capillary Refill: Capillary refill takes less than 2 seconds.   Neurological:      General: No focal deficit present.      Mental Status: He is alert and oriented to person, place, and time.      Sensory: No sensory deficit.      Motor: No weakness.   Psychiatric:         Mood and Affect: Mood normal.         Behavior: Behavior normal.              CRANIAL NERVES     CN III, IV, VI   Pupils are equal, round, and reactive to light.       Significant Labs: All pertinent labs within the past 24 hours have been reviewed.  CBC:   Recent Labs   Lab 04/01/24  0940 04/01/24  1708   WBC 3.92 3.57*   HGB 6.0* 5.7*   HCT 21.8* 20.1*   * 136*     CMP:   Recent Labs   Lab 04/01/24  0940 04/01/24  1708    131*   K 4.3 4.3    101   CO2 21* 22*   * 208*   BUN 31* 32*   CREATININE 1.8* 1.7*   CALCIUM 9.0 8.9   PROT 5.9* 6.5   ALBUMIN 3.3* 3.3*   BILITOT 0.2 0.3   ALKPHOS 46* 47*   AST 10 11   ALT <5* <5*   ANIONGAP 10 8       Significant Imaging: I  have reviewed all pertinent imaging results/findings within the past 24 hours.  Imaging Results    None

## 2024-04-01 NOTE — ASSESSMENT & PLAN NOTE
Patient is chronically on statin.will continue for now. Monitor clinically. Last LDL was   Lab Results   Component Value Date    LDLCALC 61.4 (L) 06/07/2023

## 2024-04-01 NOTE — ASSESSMENT & PLAN NOTE
Anticoagulant long-term use   Patient with Paroxysmal (<7 days) atrial fibrillation which is controlled currently with Beta Blocker. Patient is currently in atrial fibrillation.[id number] Score: 2. Anticoagulation indicated. Anticoagulation done with eliquis . Hold eliquis due to acute worsening of bl anemia and concern for bleeding.

## 2024-04-01 NOTE — ASSESSMENT & PLAN NOTE
Patient's anemia is currently uncontrolled. Has received 2 units of PRBCs on 4/1 . Etiology likely d/t chronic disease due to Chronic Kidney Disease and possible blood loss, GIB? Patient denies hematemesis, melena, or hematochezia. Did have recent issues with diarrhea and was found to have h. Pylori but has not received tx.  Current CBC reviewed-   Lab Results   Component Value Date    HGB 5.7 (LL) 04/01/2024    HCT 20.1 (L) 04/01/2024     Monitor serial CBC and transfuse if patient becomes hemodynamically unstable, symptomatic or H/H drops below 7/21.  -Anemia panel in process.  -LEAH in the ED revealed brown stool in rectal vault.  -Heme occult pending.  -Hold ASA and eliquis for now.

## 2024-04-01 NOTE — H&P
[facility] - Emergency Dept  [facility]  History & Physical    Patient Name: Ravi Zamorano  MRN: [id number]  Patient Class: Emergency  Admission Date: 4/1/2024  Attending Physician: Madonna Fuller MD   Primary Care Provider: Mima Mack MD         Patient information was obtained from patient, past medical records, and ER records.     Subjective:     Principal Problem:Symptomatic anemia    Chief Complaint:   Chief Complaint   Patient presents with    Abnormal Lab     Hgb 6, had lab drawn today , been dizzy        HPI: Ravi Zamorano is a 68 y.o. male with a PMHx of anemia, HLD, HTN, a fib on eliquis, DM2, neuropathy, CHF (EF 65%), ESRD S/p kidney transplant 2016, and CKD3 who presents to the ED for abnormal outpatient labs. The patient reports ongoing lightheadedness/dizziness for at least 4 months noting it has worsened in the past several weeks. The patient also notes persistent non productive for about 6 weeks. Denies shortness of breath at rest. Endorses DOSS for the past month. The patient also had been experiencing diarrhea that began 6 weeks ago and completed outpatient stool studies and was prescribed lomotil. He reports the diarrhea has improved and states he stool is soft but not loose anymore. Denies melena or hematochezia. The patient reports his BP has been more elevated recently and was restarted on a lower dose of valsartan today, which he hasn't taken yet. He denies fever/chills, N/V, abdominal pain, CP, or dysuria.    In the ED, patient hypertensive otherwise vitals stable, afebrile. WBC 3.57. Hgb 5.7 (9.1 previously 11/2023). Plt 136 (chronic, stable). Na 131. Cr 1.7 (bl 1.7-1.8). Albumin 3.3. Glucose 208. Anemia panel in process. EKG shows a fib, 76 bpm, no ST elevation or depression. Type & screen completed. 2u PRBCs ordered. LEAH performed by ED provider revealed brown stool in rectal vault.    Past Medical History:   Diagnosis Date    Anxiety 7/29/2014    Arthritis      Bilateral diabetic retinopathy 2017    CKD (chronic kidney disease) stage 2, GFR 60-89 ml/min 12/28/2016    CKD (chronic kidney disease) stage 4, GFR 15-29 ml/min 7/29/2014    Colon polyps 2014    Depression - situational 7/29/2014    Diabetes mellitus     Diabetes type 2 since 2000 7/29/2014    Diabetic neuropathy 7/29/2014    History of cardioversion 10/3/2019    History of hepatitis C, s/p successful Rx w/ SVR24 - 9/2017 7/29/2014    Genotype 1a, treatment naive 10/2014 liver biopsy - grade 1 / stage 1 Completed Harvoni w/ SVR    Hyperlipidemia 7/29/2014    Hypertension     Neuropathy     Organ transplant candidate 7/29/2014    Pancreatitis     S/P cadaveric kidney transplant 11/5/2016. ESRD secondary to HTN/DMII 11/5/2016    Stage 3a chronic kidney disease 12/28/2016    Type 2 diabetes mellitus with stage 3a chronic kidney disease, with long-term current use of insulin 7/29/2014       Past Surgical History:   Procedure Laterality Date    APPENDECTOMY      CATARACT EXTRACTION W/  INTRAOCULAR LENS IMPLANT Right 3/13/2024    Procedure: EXTRACTION, CATARACT, WITH IOL INSERTION;  Surgeon: Jennifer Palacio MD;  Location: [facility] OR;  Service: Ophthalmology;  Laterality: Right;    COLONOSCOPY N/A 12/21/2020    Procedure: COLONOSCOPY;  Surgeon: Tico Bell MD;  Location: [facility] ENDO ([address] [place] [address]);  Service: Endoscopy;  Laterality: N/A;  ok to hold eliquis per Dr. Valadez, see telephone encounter 11/13/2020-[place]  covid test 12/18 [place]    KIDNEY TRANSPLANT      TRANSESOPHAGEAL ECHOCARDIOGRAPHY N/A 8/26/2019    Procedure: ECHOCARDIOGRAM, TRANSESOPHAGEAL;  Surgeon: Dolores Diagnostic Provider;  Location: [facility] EP LAB;  Service: Cardiology;  Laterality: N/A;    TREATMENT OF CARDIAC ARRHYTHMIA N/A 8/26/2019    Procedure: CARDIOVERSION;  Surgeon: Bronson Bowden MD;  Location: [facility] EP LAB;  Service: Cardiology;  Laterality: N/A;  AF, FELICIANO, DCCV, MAC, GP, 3 PREP       Review of patient's allergies indicates:   Allergen Reactions     Nifedipine Other (See Comments)     Gingival hyperplasia       Current Facility-Administered Medications on File Prior to Encounter   Medication    balanced salt irrigation intra-ocular solution 1 drop    phenylephrine HCL 10% ophthalmic solution 1 drop    proparacaine 0.5 % ophthalmic solution 1 drop    sodium chloride 0.9% flush 10 mL    TETRAcaine HCl (PF) 0.5 % Drop 1 drop     Current Outpatient Medications on File Prior to Encounter   Medication Sig    apixaban (ELIQUIS) 5 mg Tab Take 1 tablet (5 mg total) by mouth 2 (two) times daily.    aspirin 81 MG Chew Take 81 mg by mouth once daily.    atorvastatin (LIPITOR) 40 MG tablet Take 1 tablet (40 mg total) by mouth once daily.    blood-glucose sensor Kayla Gin 3, use as directed, change every 14 days , e 11.65    carvediloL (COREG) 25 MG tablet Take 1 tablet (25 mg total) by mouth 2 (two) times daily.    diphenoxylate-atropine 2.5-0.025 mg (LOMOTIL) 2.5-0.025 mg per tablet Take 1 tablet by mouth 4 (four) times daily as needed for Diarrhea.    empagliflozin (JARDIANCE) 10 mg tablet Take 1 tablet (10 mg total) by mouth once daily.    famotidine (PEPCID) 20 MG tablet Take 1 tablet (20 mg total) by mouth every evening.    gabapentin (NEURONTIN) 300 MG capsule Take 1 capsule by mouth in the morning, 1 capsule midday, and 3 capsules at night.    hydroCHLOROthiazide (HYDRODIURIL) 25 MG tablet Take 1 tablet (25 mg total) by mouth once daily.    insulin (LANTUS SOLOSTAR U-100 INSULIN) glargine 100 units/mL SubQ pen Inject 20 Units into the skin every morning.    insulin aspart U-100 (NOVOLOG) 100 unit/mL (3 mL) InPn pen Inject 16 units w/ breakfast, 10 units at lunch and dinner scale 180-230+2, 231-280+4, 281-330+6, 331-380+8, >380+10.  *Max daily 66 units.*    meclizine (ANTIVERT) 25 mg tablet Take 1 tablet (25 mg total) by mouth 3 (three) times daily as needed for Dizziness.    melatonin 5 mg Tab Take 1 tablet (5 mg total) by mouth every evening. (Patient taking  "differently: Take 1 tablet by mouth as needed.)    mycophenolate (CELLCEPT) 250 mg Cap Take 4 capsules (1,000 mg total) by mouth 2 (two) times daily.    pantoprazole (PROTONIX) 40 MG tablet Take 1 tablet (40 mg total) by mouth once daily. for 14 days    pen needle, diabetic (BD ULTRA-FINE LO PEN NEEDLE) 32 gauge x 5/32" Ndle USE TO ADMINISTER INSULIN 4 TIMES DAILY.    pen needle, diabetic (BD ULTRA-FINE LO PEN NEEDLE) 32 gauge x 5/32" Ndle use four times a day    prednisolon/gatiflox/bromfenac (PREDNISOL ACE-GATIFLOX-BROMFEN) 1-0.5-0.075 % DrpS Apply 1 drop to eye 3 (three) times daily.    predniSONE (DELTASONE) 5 MG tablet Take 1 tablet (5 mg total) by mouth once daily.    tacrolimus (PROGRAF) 0.5 MG Cap Take 1 capsule (0.5 mg total) by mouth every 12 (twelve) hours.    valsartan (DIOVAN) 40 MG tablet Take 1 tablet (40 mg total) by mouth once daily.    [DISCONTINUED] atorvastatin (LIPITOR) 40 MG tablet Take 1 tablet (40 mg total) by mouth once daily. (Patient taking differently: Take by mouth once daily.)    [DISCONTINUED] benzonatate (TESSALON) 100 MG capsule Take 1 capsule (100 mg total) by mouth 3 (three) times daily as needed for Cough.    [DISCONTINUED] carvediloL (COREG) 25 MG tablet Take 1 tablet (25 mg total) by mouth 2 (two) times daily.    [DISCONTINUED] insulin lispro (HUMALOG KWIKPEN INSULIN) 100 unit/mL pen Inject 18 units w/ breakfast, 16 units w/ lunch and dinner plus scale 150-200 +2, 201-250 +4, 251-300 +6, 301-350 +8.    [DISCONTINUED] lancets (ONETOUCH DELICA PLUS LANCET) 33 gauge Misc USE TO CHECK BLOOD GLUCOSE FOUR TIMES DAILY    [DISCONTINUED] spironolactone (ALDACTONE) 25 MG tablet Take 1 tablet (25 mg total) by mouth once daily.     Family History       Problem Relation (Age of Onset)    Cancer Brother    Diabetes Mother    Heart disease Mother, Father    Hypertension Mother, Brother          Tobacco Use    Smoking status: Former     Current packs/day: 0.00     Average packs/day: 0.5 " packs/day for 32.0 years (16.0 ttl pk-yrs)     Types: Cigarettes     Start date: 1984     Quit date: 2016     Years since quittin.3    Smokeless tobacco: Never    Tobacco comments:     Pt reports that he quit in , but started up again in . pt reports he is currently working on quitting again   Substance and Sexual Activity    Alcohol use: Yes     Comment: Pt reports occasional beers on Sundays. Pt reports drinking daily prior to ESRD diagnosis.    Drug use: No    Sexual activity: Yes     Partners: Female     Review of Systems   Constitutional:  Positive for fatigue. Negative for appetite change, chills, diaphoresis and fever.   HENT:  Negative for congestion, rhinorrhea and sore throat.    Eyes:  Negative for photophobia and visual disturbance.   Respiratory:  Positive for cough. Negative for shortness of breath and wheezing.         +DOSS   Gastrointestinal:  Positive for diarrhea (improved). Negative for abdominal distention, abdominal pain, blood in stool, nausea and vomiting.   Genitourinary:  Negative for dysuria, frequency and hematuria.   Musculoskeletal:  Negative for back pain, myalgias and neck pain.   Skin:  Negative for color change, pallor, rash and wound.   Neurological:  Positive for dizziness and light-headedness. Negative for weakness and headaches.   Psychiatric/Behavioral:  Negative for confusion and hallucinations. The patient is not nervous/anxious.      Objective:     Vital Signs (Most Recent):  Temp: 98.3 °F (36.8 °C) (24 1547)  Pulse: 79 (24 1708)  Resp: 18 (24 1708)  BP: (!) 153/70 (24 1708)  SpO2: 96 % (24 1708) Vital Signs (24h Range):  Temp:  [98.3 °F (36.8 °C)] 98.3 °F (36.8 °C)  Pulse:  [76-81] 79  Resp:  [18] 18  SpO2:  [96 %-100 %] 96 %  BP: (138-159)/(62-75) 153/70     Weight: 86.6 kg (191 lb)  Body mass index is 25.2 kg/m².     Physical Exam  Vitals and nursing note reviewed.   Constitutional:       General: He is not in acute  distress.     Appearance: He is not toxic-appearing or diaphoretic.   HENT:      Head: Normocephalic and atraumatic.      Nose: Nose normal.      Mouth/Throat:      Mouth: Mucous membranes are moist.   Eyes:      Pupils: Pupils are equal, round, and reactive to light.   Cardiovascular:      Rate and Rhythm: Normal rate. Rhythm irregularly irregular.      Pulses: Normal pulses.   Pulmonary:      Effort: Pulmonary effort is normal. No respiratory distress.      Breath sounds: No wheezing, rhonchi or rales.   Abdominal:      General: Bowel sounds are normal. There is no distension.      Palpations: Abdomen is soft.      Tenderness: There is no abdominal tenderness. There is no guarding.   Musculoskeletal:         General: Normal range of motion.      Cervical back: Normal range of motion.      Right lower leg: No edema.      Left lower leg: No edema.   Skin:     General: Skin is warm and dry.      Capillary Refill: Capillary refill takes less than 2 seconds.   Neurological:      General: No focal deficit present.      Mental Status: He is alert and oriented to person, place, and time.      Sensory: No sensory deficit.      Motor: No weakness.   Psychiatric:         Mood and Affect: Mood normal.         Behavior: Behavior normal.              CRANIAL NERVES     CN III, IV, VI   Pupils are equal, round, and reactive to light.       Significant Labs: All pertinent labs within the past 24 hours have been reviewed.  CBC:   Recent Labs   Lab 04/01/24  0940 04/01/24  1708   WBC 3.92 3.57*   HGB 6.0* 5.7*   HCT 21.8* 20.1*   * 136*     CMP:   Recent Labs   Lab 04/01/24  0940 04/01/24  1708    131*   K 4.3 4.3    101   CO2 21* 22*   * 208*   BUN 31* 32*   CREATININE 1.8* 1.7*   CALCIUM 9.0 8.9   PROT 5.9* 6.5   ALBUMIN 3.3* 3.3*   BILITOT 0.2 0.3   ALKPHOS 46* 47*   AST 10 11   ALT <5* <5*   ANIONGAP 10 8       Significant Imaging: I have reviewed all pertinent imaging results/findings within the past  24 hours.  Imaging Results    None         Assessment/Plan:     * Symptomatic anemia  Patient's anemia is currently uncontrolled. Has received 2 units of PRBCs on 4/1 . Etiology likely d/t chronic disease due to Chronic Kidney Disease and possible blood loss, GIB? Patient denies hematemesis, melena, or hematochezia. Did have recent issues with diarrhea and was found to have h. Pylori but has not received tx.  Current CBC reviewed-   Lab Results   Component Value Date    HGB 5.7 (LL) 04/01/2024    HCT 20.1 (L) 04/01/2024     Monitor serial CBC and transfuse if patient becomes hemodynamically unstable, symptomatic or H/H drops below 7/21.  -Anemia panel in process.  -LEAH in the ED revealed brown stool in rectal vault.  -Heme occult pending.  -Hold ASA and eliquis for now.    H. pylori infection  -Patient with diarrhea beginning mid February. Had stool studies outpatient. Reports he took lomotil. States stool is still soft but not as loose as before.  -H pylori infection noted and appears protonix and clarithromycin were ordered, but pt reports he never received the test results and did not know about the prescriptions.  -Will treat with clarithromycin 500mg bid, amoxicillin 1g bid, and protonix 40mg bid x14 days.    Thrombocytopenia, unspecified  Patient was found to have thrombocytopenia, the likely etiology is primary from bone marrow suppression, will monitor the platelets Daily. Will transfuse if platelet count is <50k (if undergoing surgical procedure or have active bleeding). Hold DVT prophylaxis if platelets are <50k. The patient's platelet results have been reviewed and are listed below.  Recent Labs   Lab 04/01/24  1708   *       Paroxysmal A-fib  Anticoagulant long-term use   Patient with Paroxysmal (<7 days) atrial fibrillation which is controlled currently with Beta Blocker. Patient is currently in atrial fibrillation.[id number] Score: 2. Anticoagulation indicated. Anticoagulation done with eliquis  . Hold eliquis due to acute worsening of bl anemia and concern for bleeding.    PAD (peripheral artery disease)  -Chronic issue.  -Continue statin. Hold ASA and eliquis due to concern for bleeding.    Stage 3 chronic kidney disease  Creatine stable for now. BMP reviewed- noted Estimated Creatinine Clearance: 48.3 mL/min (A) (based on SCr of 1.7 mg/dL (H)). according to latest data. Based on current GFR, CKD stage is stage 3 - GFR 30-59.  Monitor UOP and serial BMP and adjust therapy as needed. Renally dose meds. Avoid nephrotoxic medications and procedures.    S/P cadaveric kidney transplant 11/5/2016. ESRD secondary to HTN/DMII  Long-term use of immunosuppressant medication   -Tacrolimus 0.5mg twice daily, Prednisone 5mg daily, and Mycophenolate 250mg 4 caps twice daily; hold mycophenolate for d/t h pylori infection.  -tacrolimus level daily  -KTM consulted, appreciate assistance.    Chronic diastolic congestive heart failure  Patient is identified as having Diastolic (HFpEF) heart failure that is Chronic. CHF is currently controlled. Latest ECHO performed and demonstrates- Results for orders placed during the hospital encounter of 07/10/22    Echo Saline Bubble? No    Interpretation Summary  · The left ventricle is normal in size with eccentric hypertrophy and normal systolic function. The estimated ejection fraction is 65%.  · Normal right ventricular size with normal right ventricular systolic function.  · Left ventricular diastolic dysfunction.  · Biatrial enlargement.  · Mild aortic regurgitation.  · PA systolic pressure is at elast 39 mmHg. The IVC is not visualized.  . Continue Beta Blocker, ACE/ARB, and Aldactone and monitor clinical status closely. Monitor on telemetry. Patient is off CHF pathway.  Monitor strict Is&Os and daily weights. Cardiology has not been consulted. Continue to stress to patient importance of self efficacy and  on diet for CHF.     Hyperlipidemia   Patient is chronically on  "statin.will continue for now. Monitor clinically. Last LDL was   Lab Results   Component Value Date    LDLCALC 61.4 (L) 06/07/2023       Hypertension since 2000  Chronic, uncontrolled. Latest blood pressure and vitals reviewed-     Temp:  [98.3 °F (36.8 °C)]   Pulse:  [73-81]   Resp:  [17-18]   BP: (138-188)/(62-86)   SpO2:  [96 %-100 %] .   Home meds for hypertension were reviewed and noted below.   Hypertension Medications               hydroCHLOROthiazide (HYDRODIURIL) 25 MG tablet Take 1 tablet (25 mg total) by mouth once daily.    carvediloL (COREG) 25 MG tablet Take 1 tablet (25 mg total) by mouth 2 (two) times daily.    valsartan (DIOVAN) 40 MG tablet Take 1 tablet (40 mg total) by mouth once daily.            While in the hospital, will manage blood pressure as follows; Continue home antihypertensive regimen. Patient reports valsartan was restarted today due to elevated BP.    Will utilize p.r.n. blood pressure medication only if patient's blood pressure greater than 180/110 and he develops symptoms such as worsening chest pain or shortness of breath.    Diabetic polyneuropathy associated with type 2 diabetes mellitus  -Chronic issue.  -Continue home gabapentin.    Type 2 diabetes mellitus with stage 3a chronic kidney disease, with long-term current use of insulin  Patient's FSGs are controlled on current hypoglycemics.   Last A1c reviewed-   Lab Results   Component Value Date    HGBA1C 8.3 (H) 03/11/2024     Most recent fingerstick glucose reviewed- No results for input(s): "POCTGLUCOSE" in the last 24 hours.  Current correctional scale  Low  Maintain anti-hyperglycemic dose as follows-   Antihyperglycemics (From admission, onward)      Start     Stop Route Frequency Ordered    04/02/24 0900  insulin detemir U-100 (Levemir) pen 15 Units         -- SubQ Daily 04/01/24 1845 04/01/24 1923  insulin aspart U-100 pen 0-5 Units         -- SubQ Before meals & nightly PRN 04/01/24 1823        -Accuchecks " AC/HS      VTE Risk Mitigation (From admission, onward)           Ordered     IP VTE HIGH RISK PATIENT  Once         04/01/24 1831     Place sequential compression device  Until discontinued         04/01/24 1831                                    Lesa Shine NP  Department of Hospital Medicine  [facility] - Emergency Dept

## 2024-04-01 NOTE — TELEPHONE ENCOUNTER
----- Message from Mima Mack MD sent at 4/1/2024  2:47 PM CDT -----  Hemoglobin is critically low at 6. He needs to go to ED ASAP.   Kidney function is stable.  Thanks.

## 2024-04-01 NOTE — TELEPHONE ENCOUNTER
----- Message from Mindy Jimenez MA sent at 4/1/2024 11:24 AM CDT -----  The patient would like to talk to you about his surgery  ablation please call [phone]. Thank you.

## 2024-04-01 NOTE — ASSESSMENT & PLAN NOTE
"Patient's FSGs are controlled on current hypoglycemics.   Last A1c reviewed-   Lab Results   Component Value Date    HGBA1C 8.3 (H) 03/11/2024     Most recent fingerstick glucose reviewed- No results for input(s): "POCTGLUCOSE" in the last 24 hours.  Current correctional scale  Low  Maintain anti-hyperglycemic dose as follows-   Antihyperglycemics (From admission, onward)      Start     Stop Route Frequency Ordered    04/02/24 0900  insulin detemir U-100 (Levemir) pen 15 Units         -- SubQ Daily 04/01/24 1845    04/01/24 1923  insulin aspart U-100 pen 0-5 Units         -- SubQ Before meals & nightly PRN 04/01/24 1823        -Accuchecks AC/HS  "

## 2024-04-01 NOTE — HPI
Ravi Zamorano is a 68 y.o. male with a PMHx of anemia, HLD, HTN, a fib on eliquis, DM2, neuropathy, CHF (EF 65%), ESRD S/p kidney transplant 2016, and CKD3 who presents to the ED for abnormal outpatient labs. The patient reports ongoing lightheadedness/dizziness for at least 4 months noting it has worsened in the past several weeks. The patient also notes persistent non productive for about 6 weeks. Denies shortness of breath at rest. Endorses DOSS for the past month. The patient also had been experiencing diarrhea that began 6 weeks ago and completed outpatient stool studies and was prescribed lomotil. He reports the diarrhea has improved and states he stool is soft but not loose anymore. Denies melena or hematochezia. The patient reports his BP has been more elevated recently and was restarted on a lower dose of valsartan today, which he hasn't taken yet. He denies fever/chills, N/V, abdominal pain, CP, or dysuria.    In the ED, patient hypertensive otherwise vitals stable, afebrile. WBC 3.57. Hgb 5.7 (9.1 previously 11/2023). Plt 136 (chronic, stable). Na 131. Cr 1.7 (bl 1.7-1.8). Albumin 3.3. Glucose 208. Anemia panel in process. EKG shows a fib, 76 bpm, no ST elevation or depression. Type & screen completed. 2u PRBCs ordered. LEAH performed by ED provider revealed brown stool in rectal vault.

## 2024-04-01 NOTE — FIRST PROVIDER EVALUATION
"Medical screening examination initiated.  I have conducted a focused provider triage encounter, findings are as follows:    Brief history of present illness:  Hx. Kidney transplant, anemia. Presents as referral from PCP for Hb 6. No black/bloody stools or vomiting    Vitals:    04/01/24 1547   BP: (!) 159/75   Pulse: 81   Resp: 18   Temp: 98.3 °F (36.8 °C)   TempSrc: Oral   SpO2: 100%   Weight: 86.6 kg (191 lb)   Height: 6' 1" (1.854 m)       Pertinent physical exam:  Well-appearing, alert. Hemodynamically stable    Brief workup plan:  Labs, type&screen. Ordered 2U PRBC to be prepared, will defer transfusion to primary team. EKG shows atrial flutter with variable AV block, no STEMI    Preliminary workup initiated; this workup will be continued and followed by the physician or advanced practice provider that is assigned to the patient when roomed.  "

## 2024-04-01 NOTE — ED PROVIDER NOTES
Source of History  patient and EMR    Chief Complaint    Abnormal Lab (Hgb 6, had lab drawn today , been dizzy)      History of Present Illness    Ravi Zamorano is a 68 y.o. male presenting with concerns for dizziness, had hemoglobin drawn today was 6.0.  This is new for him.  No melena or hematemesis.  Prior colonoscopy was reportedly normal and it was a routine colonoscopy.  Patient is on Eliquis for atrial fibrillation.    Prior history includes kidney transplant in 2016 in his on immunosuppressive medication, paroxysmal atrial fibrillation on blood thinners, chf, among other medical comorbidities.    Review of Systems    As per HPI and below:  Constitutional symptoms:  No chills, no sweats, no fever   Skin symptoms:  No rash    Eye symptoms:  No blurred vision  ENMT symptoms:  No sore throat    Respiratory symptoms:  No shortness of breath  Cardiovascular symptoms:  No chest pain , general dizziness but no syncope  Gastrointestinal symptoms:  No abdominal pain, no nausea, no vomiting, no diarrhea, no melena or hematemesis  Genitourinary symptoms:  No dysuria or hematuria  Musculoskeletal symptoms:  No back pain, No joint pains or aches    Neurologic symptoms:  No headache  Endocrine symptoms:  None except as in HPI     Past History    As per HPI and below:  Past Medical History:   Diagnosis Date    Anxiety 7/29/2014    Arthritis     Bilateral diabetic retinopathy 2017    CKD (chronic kidney disease) stage 2, GFR 60-89 ml/min 12/28/2016    CKD (chronic kidney disease) stage 4, GFR 15-29 ml/min 7/29/2014    Colon polyps 2014    Depression - situational 7/29/2014    Diabetes mellitus     Diabetes type 2 since 2000 7/29/2014    Diabetic neuropathy 7/29/2014    History of cardioversion 10/3/2019    History of hepatitis C, s/p successful Rx w/ SVR24 - 9/2017 7/29/2014    Genotype 1a, treatment naive 10/2014 liver biopsy - grade 1 / stage 1 Completed Harvoni w/ SVR    Hyperlipidemia 7/29/2014    Hypertension      Neuropathy     Organ transplant candidate 2014    Pancreatitis     S/P cadaveric kidney transplant 2016. ESRD secondary to HTN/DMII 2016    Stage 3a chronic kidney disease 2016    Type 2 diabetes mellitus with stage 3a chronic kidney disease, with long-term current use of insulin 2014       Past Surgical History:   Procedure Laterality Date    APPENDECTOMY      CATARACT EXTRACTION W/  INTRAOCULAR LENS IMPLANT Right 3/13/2024    Procedure: EXTRACTION, CATARACT, WITH IOL INSERTION;  Surgeon: Jennifer Palacio MD;  Location: [facility] OR;  Service: Ophthalmology;  Laterality: Right;    COLONOSCOPY N/A 2020    Procedure: COLONOSCOPY;  Surgeon: Tico Bell MD;  Location: [facility] ENDO (4TH FLR);  Service: Endoscopy;  Laterality: N/A;  ok to hold eliquis per Dr. Valadez, see telephone encounter 2020-MS  covid test  [place]    KIDNEY TRANSPLANT      TRANSESOPHAGEAL ECHOCARDIOGRAPHY N/A 2019    Procedure: ECHOCARDIOGRAM, TRANSESOPHAGEAL;  Surgeon: Dolores Diagnostic Provider;  Location: [facility] EP LAB;  Service: Cardiology;  Laterality: N/A;    TREATMENT OF CARDIAC ARRHYTHMIA N/A 2019    Procedure: CARDIOVERSION;  Surgeon: Bronson Bowden MD;  Location: [facility] EP LAB;  Service: Cardiology;  Laterality: N/A;  AF, FELICIANO, DCCV, MAC, GP, 3 PREP       Social History     Socioeconomic History    Marital status:    Occupational History     Employer: [employer]   Tobacco Use    Smoking status: Former     Current packs/day: 0.00     Average packs/day: 0.5 packs/day for 32.0 years (16.0 ttl pk-yrs)     Types: Cigarettes     Start date: 1984     Quit date: 2016     Years since quittin.3    Smokeless tobacco: Never    Tobacco comments:     Pt reports that he quit in , but started up again in . pt reports he is currently working on quitting again   Substance and Sexual Activity    Alcohol use: Yes     Comment: Pt reports occasional beers on Sundays. Pt  reports drinking daily prior to ESRD diagnosis.    Drug use: No    Sexual activity: Yes     Partners: Female   Social History Narrative     for 14 years     for 29 years    2 children ages 35, 36     Social Determinants of Health     Financial Resource Strain: Low Risk  (10/10/2023)    Overall Financial Resource Strain (CARDIA)     Difficulty of Paying Living Expenses: Not hard at all   Food Insecurity: No Food Insecurity (10/10/2023)    Hunger Vital Sign     Worried About Running Out of Food in the Last Year: Never true     Ran Out of Food in the Last Year: Never true   Transportation Needs: No Transportation Needs (10/10/2023)    PRAPARE - Transportation     Lack of Transportation (Medical): No     Lack of Transportation (Non-Medical): No   Physical Activity: Inactive (10/10/2023)    Exercise Vital Sign     Days of Exercise per Week: 0 days     Minutes of Exercise per Session: 0 min   Stress: No Stress Concern Present (10/10/2023)    [employer] [place] [employer] - Occupational Stress Questionnaire     Feeling of Stress : Not at all   Social Connections: Moderately Isolated (10/10/2023)    Social Connection and Isolation Panel [NHANES]     Frequency of Communication with Friends and Family: More than three times a week     Frequency of Social Gatherings with Friends and Family: Once a week     Attends Buddhism Services: Never     Active Member of Clubs or Organizations: No     Attends Club or Organization Meetings: Never     Marital Status:    Housing Stability: Unknown (10/10/2023)    Housing Stability Vital Sign     Unable to Pay for Housing in the Last Year: No     Unstable Housing in the Last Year: No       Family History   Problem Relation Age of Onset    Diabetes Mother     Hypertension Mother     Heart disease Mother         CAD with PCI    Heart disease Father     Cancer Brother         one sister with breast cancer    Hypertension Brother         one sister with HTN  and borderline DM    Stroke Neg Hx     Kidney disease Neg Hx        Review of patient's allergies indicates:   Allergen Reactions    Nifedipine Other (See Comments)     Gingival hyperplasia       Current Facility-Administered Medications on File Prior to Encounter   Medication Dose Route Frequency Provider Last Rate Last Admin    balanced salt irrigation intra-ocular solution 1 drop  1 drop Right Eye On Call Procedure Jennifer Palacio MD        phenylephrine HCL 10% ophthalmic solution 1 drop  1 drop Right Eye PRN Jennifer Palacio MD        proparacaine 0.5 % ophthalmic solution 1 drop  1 drop Right Eye Daily PRN Jennifer Palacio MD        sodium chloride 0.9% flush 10 mL  10 mL Intravenous PRN Jennifer Palacio MD        TETRAcaine HCl (PF) 0.5 % Drop 1 drop  1 drop Right Eye PRJennifer Guzman MD         Current Outpatient Medications on File Prior to Encounter   Medication Sig Dispense Refill    apixaban (ELIQUIS) 5 mg Tab Take 1 tablet (5 mg total) by mouth 2 (two) times daily. 60 tablet 11    aspirin 81 MG Chew Take 81 mg by mouth once daily.      atorvastatin (LIPITOR) 40 MG tablet Take 1 tablet (40 mg total) by mouth once daily. 90 tablet 3    blood-glucose sensor Kayla Gin 3, use as directed, change every 14 days , e 11.65 2 each 11    carvediloL (COREG) 25 MG tablet Take 1 tablet (25 mg total) by mouth 2 (two) times daily. 180 tablet 3    diphenoxylate-atropine 2.5-0.025 mg (LOMOTIL) 2.5-0.025 mg per tablet Take 1 tablet by mouth 4 (four) times daily as needed for Diarrhea. 30 tablet 0    empagliflozin (JARDIANCE) 10 mg tablet Take 1 tablet (10 mg total) by mouth once daily. 30 tablet 11    famotidine (PEPCID) 20 MG tablet Take 1 tablet (20 mg total) by mouth every evening. 90 tablet 3    gabapentin (NEURONTIN) 300 MG capsule Take 1 capsule by mouth in the morning, 1 capsule midday, and 3 capsules at night. 450 capsule 3    hydroCHLOROthiazide (HYDRODIURIL) 25 MG tablet Take 1 tablet (25 mg  "total) by mouth once daily. 30 tablet 11    insulin (LANTUS SOLOSTAR U-100 INSULIN) glargine 100 units/mL SubQ pen Inject 20 Units into the skin every morning. 15 mL 6    insulin aspart U-100 (NOVOLOG) 100 unit/mL (3 mL) InPn pen Inject 16 units w/ breakfast, 10 units at lunch and dinner scale 180-230+2, 231-280+4, 281-330+6, 331-380+8, >380+10.  *Max daily 66 units.* 30 mL 6    meclizine (ANTIVERT) 25 mg tablet Take 1 tablet (25 mg total) by mouth 3 (three) times daily as needed for Dizziness. 21 tablet 3    melatonin 5 mg Tab Take 1 tablet (5 mg total) by mouth every evening. (Patient taking differently: Take 1 tablet by mouth as needed.)      mycophenolate (CELLCEPT) 250 mg Cap Take 4 capsules (1,000 mg total) by mouth 2 (two) times daily. 240 capsule 11    pantoprazole (PROTONIX) 40 MG tablet Take 1 tablet (40 mg total) by mouth once daily. for 14 days 14 tablet 0    pen needle, diabetic (BD ULTRA-FINE LO PEN NEEDLE) 32 gauge x 5/32" Ndle USE TO ADMINISTER INSULIN 4 TIMES DAILY. 400 each 3    pen needle, diabetic (BD ULTRA-FINE LO PEN NEEDLE) 32 gauge x 5/32" Ndle use four times a day 100 each 11    prednisolon/gatiflox/bromfenac (PREDNISOL ACE-GATIFLOX-BROMFEN) 1-0.5-0.075 % DrpS Apply 1 drop to eye 3 (three) times daily. 5 mL 3    predniSONE (DELTASONE) 5 MG tablet Take 1 tablet (5 mg total) by mouth once daily. 30 tablet 11    tacrolimus (PROGRAF) 0.5 MG Cap Take 1 capsule (0.5 mg total) by mouth every 12 (twelve) hours. 60 capsule 11    valsartan (DIOVAN) 40 MG tablet Take 1 tablet (40 mg total) by mouth once daily. 90 tablet 3    [DISCONTINUED] atorvastatin (LIPITOR) 40 MG tablet Take 1 tablet (40 mg total) by mouth once daily. (Patient taking differently: Take by mouth once daily.) 90 tablet 0    [DISCONTINUED] benzonatate (TESSALON) 100 MG capsule Take 1 capsule (100 mg total) by mouth 3 (three) times daily as needed for Cough. 30 capsule 0    [DISCONTINUED] carvediloL (COREG) 25 MG tablet Take 1 " "tablet (25 mg total) by mouth 2 (two) times daily. 180 tablet 3    [DISCONTINUED] insulin lispro (HUMALOG KWIKPEN INSULIN) 100 unit/mL pen Inject 18 units w/ breakfast, 16 units w/ lunch and dinner plus scale 150-200 +2, 201-250 +4, 251-300 +6, 301-350 +8. 30 mL 4    [DISCONTINUED] lancets (ONETOUCH DELICA PLUS LANCET) 33 gauge Misc USE TO CHECK BLOOD GLUCOSE FOUR TIMES DAILY 400 each 3    [DISCONTINUED] spironolactone (ALDACTONE) 25 MG tablet Take 1 tablet (25 mg total) by mouth once daily. 90 tablet 1       Physical Exam    Reviewed nursing notes.  Vitals:    04/01/24 1547 04/01/24 1708 04/01/24 1802   BP: (!) 159/75 (!) 153/70 (!) 188/86   BP Location:  Right arm    Patient Position:  Sitting    Pulse: 81 79 73   Resp: 18 18 17   Temp: 98.3 °F (36.8 °C)     TempSrc: Oral     SpO2: 100% 96% 100%   Weight: 86.6 kg (191 lb)     Height: 6' 1" (1.854 m)       General:  Alert, no acute distress.    Skin:  Warm, dry, intact.  No rash.  Head:  Normocephalic, atraumatic.    Neck:  Supple.   HEENT:  Pupils are equal and round, appropriate for room, extraocular movements are intact.  Normal phonation.  Moist mucous membranes.  Pale mucous membranes.  Pale conjunctiva.  Cardiovascular:  Irregularly irregular rhythm, normal peripheral perfusion and no edema.    Respiratory:  Lungs are clear to auscultation, respirations are non-labored, breath sounds are equal.    Gastrointestinal:  Soft, Nontender, Non distended.   Nurse chaperone at bedside   Rectal exam without fissures, hemorrhoids, or blood.  Brown stool in vault.  Back:  Nontender. Normal gait.  Ambulatory.  Musculoskeletal:  Normal range of motion observed.  Neurological:  Alert and oriented to person, place, time, and situation.  No focal deficits observed.   Psychiatric:  Cooperative, appropriate mood & affect.       Initial MDM and Data Review    68 y.o. male presenting for evaluation of new anemia.  Hemoglobin 6.0 outpatient.  Has been feeling lightheaded and " dizzy, but no loss of blood.  Colonoscopies in the past has been normal.  On Eliquis for atrial fibrillation.  Rectal exam normal as above.    Differential includes but is not limited to:  Anemia, acute blood loss of versus other hematologic condition resulting in anemic findings    Work-up includes:  Labs, coags, iron studies among other acute anemia labs    Interventions include:  Blood transfusion    The patient has significant medical comorbidities that influence decision making for this acute process, such as:  Atrial fibrillation, diabetes, transplant kidney 2016    I decided to obtain the patient's medical records and review relevant documentation from clinic records.  Pertinent information is noted.  Reviewed prior CBC use, previously had been in the 12 range, then 11 range in 2022, at the end of 2023 was 9.1, and April 1st 6.0    Medications   atorvastatin tablet 40 mg (has no administration in time range)   predniSONE tablet 5 mg (has no administration in time range)   melatonin tablet 6 mg (has no administration in time range)   sodium chloride 0.9% flush 10 mL (has no administration in time range)   0.9%  NaCl infusion (for blood administration) (has no administration in time range)   glucose chewable tablet 16 g (has no administration in time range)   glucose chewable tablet 24 g (has no administration in time range)   glucagon (human recombinant) injection 1 mg (has no administration in time range)   insulin aspart U-100 pen 0-5 Units (has no administration in time range)   dextrose 10% bolus 125 mL 125 mL (has no administration in time range)   dextrose 10% bolus 250 mL 250 mL (has no administration in time range)   carvediloL tablet 25 mg (has no administration in time range)   gabapentin capsule 300 mg (has no administration in time range)     And   gabapentin capsule 600 mg (has no administration in time range)   tacrolimus capsule 0.5 mg (has no administration in time range)   famotidine tablet 20  mg (has no administration in time range)   hydroCHLOROthiazide tablet 25 mg (has no administration in time range)   insulin detemir U-100 (Levemir) pen 15 Units (has no administration in time range)       Results and ED Course    Labs Reviewed   CBC W/ AUTO DIFFERENTIAL - Abnormal; Notable for the following components:       Result Value    WBC 3.57 (*)     RBC 2.53 (*)     Hemoglobin 5.7 (*)     Hematocrit 20.1 (*)     MCV 79 (*)     MCH 22.5 (*)     MCHC 28.4 (*)     RDW 16.4 (*)     Platelets 136 (*)     Immature Granulocytes 2.0 (*)     Immature Grans (Abs) 0.07 (*)     Lymph # 0.2 (*)     Gran % 74.2 (*)     Lymph % 6.4 (*)     Mono % 16.5 (*)     Platelet Estimate Decreased (*)     All other components within normal limits    Narrative:       hgb critical result(s) called and verbal readback obtained from                   EDWINA RENDON RN  by JAZMIN 04/01/2024 17:52   COMPREHENSIVE METABOLIC PANEL - Abnormal; Notable for the following components:    Sodium 131 (*)     CO2 22 (*)     Glucose 208 (*)     BUN 32 (*)     Creatinine 1.7 (*)     Albumin 3.3 (*)     Alkaline Phosphatase 47 (*)     ALT <5 (*)     eGFR 43.9 (*)     All other components within normal limits   IRON AND TIBC - Abnormal; Notable for the following components:    Transferrin 141 (*)     TIBC 209 (*)     Saturated Iron 56 (*)     All other components within normal limits   FERRITIN - Abnormal; Notable for the following components:    Ferritin 816 (*)     All other components within normal limits   HIV 1 / 2 ANTIBODY    Narrative:     Release to patient->Immediate   LACTATE DEHYDROGENASE   RETICULOCYTES   TSH   APTT   PROTIME-INR   HAPTOGLOBIN   VITAMIN B12   FOLATE   URINALYSIS, REFLEX TO URINE CULTURE   PROTEIN, QUANTITATIVE, URINE RANDOM   OSMOLALITY, URINE RANDOM   SODIUM, URINE, RANDOM   CREATININE, URINE, RANDOM   OCCULT BLOOD X 1, STOOL   TYPE & SCREEN   POCT GLUCOSE MONITORING CONTINUOUS   PREPARE RBC SOFT   PREPARE RBC SOFT        Imaging Results    None         ED Course as of 04/01/24 1856 Mon Apr 01, 2024 1807 Sodium(!): 131 [AC]   1807 BUN(!): 32 [AC]   1807 Creatinine(!): 1.7 [AC]      ED Course User Index  [AC] Anatoly Bains DO               EKG interpreted by myself    EKG  Performed: 04/01/2024   Rate/Rhythm/Axis: 76 bpm, irregularly irregular rhythm, nml axis   ms  Qtc 432 ms  Impression:  Atrial fibrillation, no ischemia, normal intervals    Impression and Plan    68 y.o. male with findings of acute anemia symptomatic based on the work up in the emergency department as above.    Important lab/imaging findings include:  Reviewed, hemoglobin 5.7, platelet 136  Sodium 131, creatinine 1.7 patient has CKD      All tests, treatment options and disposition options were discussed with the patient.  The decision was made to admit the patient to the hospital.    The patient was admitted in stable condition and all further questions/concerns by patient and/or family were addressed.    Critical Care:  Date: 04/01/2024  Performed by: Dr. Anatoly Bains  Authorized by: Dr. Anatoly Bains   Total critical care time (exclusive of procedural time) : 35 minutes  Upon my evaluation, this patient had a high probability of imminent or life-threatening deterioration due to [symptomatic anemia ] which required my direct attention, intervention and personal management.  Critical care was necessary to treat or prevent imminent or life-threatening deterioration.  This may include review of laboratory data, radiology results, discussion with consultants and family, and monitoring for potential decompensations. Interventions were performed as documented.                   Final diagnoses:  [R42] Dizziness  [D64.9] Symptomatic anemia (Primary)        ED Disposition Condition    Observation Stable                  Anatoly Bains DO  04/01/24 1857

## 2024-04-01 NOTE — ASSESSMENT & PLAN NOTE
Chronic, uncontrolled. Latest blood pressure and vitals reviewed-     Temp:  [98.3 °F (36.8 °C)]   Pulse:  [73-81]   Resp:  [17-18]   BP: (138-188)/(62-86)   SpO2:  [96 %-100 %] .   Home meds for hypertension were reviewed and noted below.   Hypertension Medications               hydroCHLOROthiazide (HYDRODIURIL) 25 MG tablet Take 1 tablet (25 mg total) by mouth once daily.    carvediloL (COREG) 25 MG tablet Take 1 tablet (25 mg total) by mouth 2 (two) times daily.    valsartan (DIOVAN) 40 MG tablet Take 1 tablet (40 mg total) by mouth once daily.            While in the hospital, will manage blood pressure as follows; Continue home antihypertensive regimen. Patient reports valsartan was restarted today due to elevated BP.    Will utilize p.r.n. blood pressure medication only if patient's blood pressure greater than 180/110 and he develops symptoms such as worsening chest pain or shortness of breath.

## 2024-04-01 NOTE — ASSESSMENT & PLAN NOTE
Long-term use of immunosuppressant medication   -Tacrolimus 0.5mg twice daily, Prednisone 5mg daily, and Mycophenolate 250mg 4 caps twice daily; hold mycophenolate for d/t h pylori infection.  -tacrolimus level daily  -KTM consulted, appreciate assistance.

## 2024-04-02 PROBLEM — D64.9 SYMPTOMATIC ANEMIA: Status: ACTIVE | Noted: 2024-04-02

## 2024-04-02 LAB
ALBUMIN SERPL BCP-MCNC: 3.1 G/DL (ref 3.5–5.2)
ALP SERPL-CCNC: 48 U/L (ref 55–135)
ALT SERPL W/O P-5'-P-CCNC: <5 U/L (ref 10–44)
ANION GAP SERPL CALC-SCNC: 10 MMOL/L (ref 8–16)
AST SERPL-CCNC: 9 U/L (ref 10–40)
BACTERIA #/AREA URNS AUTO: NORMAL /HPF
BASOPHILS # BLD AUTO: 0.01 K/UL (ref 0–0.2)
BASOPHILS # BLD AUTO: 0.01 K/UL (ref 0–0.2)
BASOPHILS NFR BLD: 0.2 % (ref 0–1.9)
BASOPHILS NFR BLD: 0.3 % (ref 0–1.9)
BILIRUB SERPL-MCNC: 0.8 MG/DL (ref 0.1–1)
BILIRUB UR QL STRIP: NEGATIVE
BLD PROD TYP BPU: NORMAL
BLOOD UNIT EXPIRATION DATE: NORMAL
BLOOD UNIT TYPE CODE: 5100
BLOOD UNIT TYPE: NORMAL
BUN SERPL-MCNC: 27 MG/DL (ref 8–23)
CALCIUM SERPL-MCNC: 8.4 MG/DL (ref 8.7–10.5)
CHLORIDE SERPL-SCNC: 104 MMOL/L (ref 95–110)
CLARITY UR REFRACT.AUTO: CLEAR
CO2 SERPL-SCNC: 20 MMOL/L (ref 23–29)
CODING SYSTEM: NORMAL
COLOR UR AUTO: YELLOW
CREAT SERPL-MCNC: 1.5 MG/DL (ref 0.5–1.4)
CREAT UR-MCNC: 87 MG/DL (ref 23–375)
CROSSMATCH INTERPRETATION: NORMAL
CRP SERPL-MCNC: 6.9 MG/L (ref 0–8.2)
DIFFERENTIAL METHOD BLD: ABNORMAL
DIFFERENTIAL METHOD BLD: ABNORMAL
DISPENSE STATUS: NORMAL
EOSINOPHIL # BLD AUTO: 0 K/UL (ref 0–0.5)
EOSINOPHIL # BLD AUTO: 0.1 K/UL (ref 0–0.5)
EOSINOPHIL NFR BLD: 0.7 % (ref 0–8)
EOSINOPHIL NFR BLD: 2.6 % (ref 0–8)
ERYTHROCYTE [DISTWIDTH] IN BLOOD BY AUTOMATED COUNT: 15.5 % (ref 11.5–14.5)
ERYTHROCYTE [DISTWIDTH] IN BLOOD BY AUTOMATED COUNT: 15.8 % (ref 11.5–14.5)
ERYTHROCYTE [SEDIMENTATION RATE] IN BLOOD BY PHOTOMETRIC METHOD: 62 MM/HR (ref 0–23)
EST. GFR  (NO RACE VARIABLE): 51 ML/MIN/1.73 M^2
GLUCOSE SERPL-MCNC: 95 MG/DL (ref 70–110)
GLUCOSE UR QL STRIP: ABNORMAL
HCT VFR BLD AUTO: 23.8 % (ref 40–54)
HCT VFR BLD AUTO: 27.9 % (ref 40–54)
HGB BLD-MCNC: 7 G/DL (ref 14–18)
HGB BLD-MCNC: 8.4 G/DL (ref 14–18)
HGB UR QL STRIP: NEGATIVE
HYALINE CASTS UR QL AUTO: 0 /LPF
IMM GRANULOCYTES # BLD AUTO: 0.05 K/UL (ref 0–0.04)
IMM GRANULOCYTES # BLD AUTO: 0.14 K/UL (ref 0–0.04)
IMM GRANULOCYTES NFR BLD AUTO: 1.4 % (ref 0–0.5)
IMM GRANULOCYTES NFR BLD AUTO: 3.3 % (ref 0–0.5)
KETONES UR QL STRIP: NEGATIVE
LEUKOCYTE ESTERASE UR QL STRIP: NEGATIVE
LYMPHOCYTES # BLD AUTO: 0.2 K/UL (ref 1–4.8)
LYMPHOCYTES # BLD AUTO: 0.4 K/UL (ref 1–4.8)
LYMPHOCYTES NFR BLD: 10.6 % (ref 18–48)
LYMPHOCYTES NFR BLD: 5.7 % (ref 18–48)
MAGNESIUM SERPL-MCNC: 1.6 MG/DL (ref 1.6–2.6)
MCH RBC QN AUTO: 23.6 PG (ref 27–31)
MCH RBC QN AUTO: 24.6 PG (ref 27–31)
MCHC RBC AUTO-ENTMCNC: 29.4 G/DL (ref 32–36)
MCHC RBC AUTO-ENTMCNC: 30.1 G/DL (ref 32–36)
MCV RBC AUTO: 80 FL (ref 82–98)
MCV RBC AUTO: 82 FL (ref 82–98)
MICROSCOPIC COMMENT: NORMAL
MONOCYTES # BLD AUTO: 0.8 K/UL (ref 0.3–1)
MONOCYTES # BLD AUTO: 0.8 K/UL (ref 0.3–1)
MONOCYTES NFR BLD: 18.1 % (ref 4–15)
MONOCYTES NFR BLD: 22.1 % (ref 4–15)
NEUTROPHILS # BLD AUTO: 2.2 K/UL (ref 1.8–7.7)
NEUTROPHILS # BLD AUTO: 3 K/UL (ref 1.8–7.7)
NEUTROPHILS NFR BLD: 63 % (ref 38–73)
NEUTROPHILS NFR BLD: 72 % (ref 38–73)
NITRITE UR QL STRIP: NEGATIVE
NRBC BLD-RTO: 0 /100 WBC
NRBC BLD-RTO: 0 /100 WBC
NUM UNITS TRANS PACKED RBC: NORMAL
OHS QRS DURATION: 104 MS
OHS QRS DURATION: 112 MS
OHS QTC CALCULATION: 432 MS
OHS QTC CALCULATION: 447 MS
OSMOLALITY UR: 449 MOSM/KG (ref 50–1200)
PH UR STRIP: 6 [PH] (ref 5–8)
PLATELET # BLD AUTO: 122 K/UL (ref 150–450)
PLATELET # BLD AUTO: 128 K/UL (ref 150–450)
PMV BLD AUTO: ABNORMAL FL (ref 9.2–12.9)
PMV BLD AUTO: ABNORMAL FL (ref 9.2–12.9)
POCT GLUCOSE: 101 MG/DL (ref 70–110)
POCT GLUCOSE: 157 MG/DL (ref 70–110)
POCT GLUCOSE: 200 MG/DL (ref 70–110)
POCT GLUCOSE: 234 MG/DL (ref 70–110)
POTASSIUM SERPL-SCNC: 3.9 MMOL/L (ref 3.5–5.1)
PROT SERPL-MCNC: 5.5 G/DL (ref 6–8.4)
PROT UR QL STRIP: ABNORMAL
PROT UR-MCNC: 65 MG/DL (ref 0–15)
RBC # BLD AUTO: 2.96 M/UL (ref 4.6–6.2)
RBC # BLD AUTO: 3.42 M/UL (ref 4.6–6.2)
RBC #/AREA URNS AUTO: 1 /HPF (ref 0–4)
SODIUM SERPL-SCNC: 134 MMOL/L (ref 136–145)
SODIUM UR-SCNC: 38 MMOL/L (ref 20–250)
SP GR UR STRIP: 1.01 (ref 1–1.03)
TACROLIMUS BLD-MCNC: 8.8 NG/ML (ref 5–15)
URN SPEC COLLECT METH UR: ABNORMAL
WBC # BLD AUTO: 3.48 K/UL (ref 3.9–12.7)
WBC # BLD AUTO: 4.21 K/UL (ref 3.9–12.7)
WBC #/AREA URNS AUTO: 0 /HPF (ref 0–5)
YEAST UR QL AUTO: NORMAL

## 2024-04-02 PROCEDURE — 85652 RBC SED RATE AUTOMATED: CPT | Performed by: STUDENT IN AN ORGANIZED HEALTH CARE EDUCATION/TRAINING PROGRAM

## 2024-04-02 PROCEDURE — 86920 COMPATIBILITY TEST SPIN: CPT

## 2024-04-02 PROCEDURE — P9016 RBC LEUKOCYTES REDUCED: HCPCS

## 2024-04-02 PROCEDURE — 80197 ASSAY OF TACROLIMUS: CPT | Performed by: NURSE PRACTITIONER

## 2024-04-02 PROCEDURE — 36415 COLL VENOUS BLD VENIPUNCTURE: CPT

## 2024-04-02 PROCEDURE — 25000003 PHARM REV CODE 250

## 2024-04-02 PROCEDURE — 80053 COMPREHEN METABOLIC PANEL: CPT | Performed by: NURSE PRACTITIONER

## 2024-04-02 PROCEDURE — 96372 THER/PROPH/DIAG INJ SC/IM: CPT | Performed by: NURSE PRACTITIONER

## 2024-04-02 PROCEDURE — 93005 ELECTROCARDIOGRAM TRACING: CPT

## 2024-04-02 PROCEDURE — 99223 1ST HOSP IP/OBS HIGH 75: CPT | Mod: ,,, | Performed by: INTERNAL MEDICINE

## 2024-04-02 PROCEDURE — 25000003 PHARM REV CODE 250: Performed by: NURSE PRACTITIONER

## 2024-04-02 PROCEDURE — 93010 ELECTROCARDIOGRAM REPORT: CPT | Mod: ,,, | Performed by: INTERNAL MEDICINE

## 2024-04-02 PROCEDURE — 85025 COMPLETE CBC W/AUTO DIFF WBC: CPT | Mod: 91

## 2024-04-02 PROCEDURE — 85025 COMPLETE CBC W/AUTO DIFF WBC: CPT | Performed by: NURSE PRACTITIONER

## 2024-04-02 PROCEDURE — 86920 COMPATIBILITY TEST SPIN: CPT | Performed by: STUDENT IN AN ORGANIZED HEALTH CARE EDUCATION/TRAINING PROGRAM

## 2024-04-02 PROCEDURE — 63600175 PHARM REV CODE 636 W HCPCS: Performed by: NURSE PRACTITIONER

## 2024-04-02 PROCEDURE — 21400001 HC TELEMETRY ROOM

## 2024-04-02 PROCEDURE — 83735 ASSAY OF MAGNESIUM: CPT | Performed by: NURSE PRACTITIONER

## 2024-04-02 PROCEDURE — 82272 OCCULT BLD FECES 1-3 TESTS: CPT | Performed by: NURSE PRACTITIONER

## 2024-04-02 PROCEDURE — 36430 TRANSFUSION BLD/BLD COMPNT: CPT

## 2024-04-02 PROCEDURE — 86140 C-REACTIVE PROTEIN: CPT | Performed by: STUDENT IN AN ORGANIZED HEALTH CARE EDUCATION/TRAINING PROGRAM

## 2024-04-02 PROCEDURE — 99222 1ST HOSP IP/OBS MODERATE 55: CPT | Mod: GC,,, | Performed by: STUDENT IN AN ORGANIZED HEALTH CARE EDUCATION/TRAINING PROGRAM

## 2024-04-02 RX ORDER — HYDROCODONE BITARTRATE AND ACETAMINOPHEN 500; 5 MG/1; MG/1
TABLET ORAL
Status: DISCONTINUED | OUTPATIENT
Start: 2024-04-02 | End: 2024-04-03 | Stop reason: HOSPADM

## 2024-04-02 RX ORDER — HYDRALAZINE HYDROCHLORIDE 20 MG/ML
10 INJECTION INTRAMUSCULAR; INTRAVENOUS EVERY 8 HOURS PRN
Status: DISCONTINUED | OUTPATIENT
Start: 2024-04-02 | End: 2024-04-03 | Stop reason: HOSPADM

## 2024-04-02 RX ORDER — ACETAMINOPHEN 325 MG/1
650 TABLET ORAL EVERY 6 HOURS PRN
Status: DISCONTINUED | OUTPATIENT
Start: 2024-04-02 | End: 2024-04-03 | Stop reason: HOSPADM

## 2024-04-02 RX ORDER — LEVOFLOXACIN 500 MG/1
500 TABLET, FILM COATED ORAL DAILY
Status: DISCONTINUED | OUTPATIENT
Start: 2024-04-03 | End: 2024-04-03 | Stop reason: HOSPADM

## 2024-04-02 RX ORDER — VALSARTAN 40 MG/1
80 TABLET ORAL DAILY
Status: DISCONTINUED | OUTPATIENT
Start: 2024-04-03 | End: 2024-04-03

## 2024-04-02 RX ORDER — VALSARTAN 40 MG/1
40 TABLET ORAL ONCE
Status: COMPLETED | OUTPATIENT
Start: 2024-04-02 | End: 2024-04-02

## 2024-04-02 RX ORDER — ASPIRIN 81 MG/1
81 TABLET ORAL DAILY
Status: DISCONTINUED | OUTPATIENT
Start: 2024-04-02 | End: 2024-04-03 | Stop reason: HOSPADM

## 2024-04-02 RX ADMIN — APIXABAN 5 MG: 5 TABLET, FILM COATED ORAL at 08:04

## 2024-04-02 RX ADMIN — HYDROCHLOROTHIAZIDE 25 MG: 25 TABLET ORAL at 08:04

## 2024-04-02 RX ADMIN — TACROLIMUS 0.5 MG: 0.5 CAPSULE ORAL at 05:04

## 2024-04-02 RX ADMIN — ASPIRIN 81 MG: 81 TABLET, COATED ORAL at 01:04

## 2024-04-02 RX ADMIN — INSULIN ASPART 2 UNITS: 100 INJECTION, SOLUTION INTRAVENOUS; SUBCUTANEOUS at 05:04

## 2024-04-02 RX ADMIN — VALSARTAN 40 MG: 40 TABLET, FILM COATED ORAL at 01:04

## 2024-04-02 RX ADMIN — PANTOPRAZOLE SODIUM 40 MG: 40 TABLET, DELAYED RELEASE ORAL at 08:04

## 2024-04-02 RX ADMIN — CARVEDILOL 25 MG: 25 TABLET, FILM COATED ORAL at 08:04

## 2024-04-02 RX ADMIN — GABAPENTIN 300 MG: 300 CAPSULE ORAL at 08:04

## 2024-04-02 RX ADMIN — AMOXICILLIN 1000 MG: 500 CAPSULE ORAL at 08:04

## 2024-04-02 RX ADMIN — PREDNISONE 5 MG: 5 TABLET ORAL at 08:04

## 2024-04-02 RX ADMIN — VALSARTAN 40 MG: 40 TABLET, FILM COATED ORAL at 08:04

## 2024-04-02 RX ADMIN — APIXABAN 5 MG: 5 TABLET, FILM COATED ORAL at 01:04

## 2024-04-02 RX ADMIN — GABAPENTIN 600 MG: 300 CAPSULE ORAL at 08:04

## 2024-04-02 RX ADMIN — ATORVASTATIN CALCIUM 40 MG: 40 TABLET, FILM COATED ORAL at 08:04

## 2024-04-02 RX ADMIN — GABAPENTIN 300 MG: 300 CAPSULE ORAL at 02:04

## 2024-04-02 RX ADMIN — TACROLIMUS 0.5 MG: 0.5 CAPSULE ORAL at 08:04

## 2024-04-02 RX ADMIN — INSULIN DETEMIR 15 UNITS: 100 INJECTION, SOLUTION SUBCUTANEOUS at 08:04

## 2024-04-02 RX ADMIN — CLARITHROMYCIN 500 MG: 500 TABLET, FILM COATED ORAL at 02:04

## 2024-04-02 NOTE — PLAN OF CARE
[facility] - Observation 11H  Discharge Assessment    Primary Care Provider: Mima Mack MD     Discharge Assessment (most recent)       BRIEF DISCHARGE ASSESSMENT - 04/02/24 0905          Discharge Planning    Assessment Type Discharge Planning Brief Assessment     Resource/Environmental Concerns none     Support Systems Spouse/significant other     Equipment Currently Used at Home none     Current Living Arrangements home     Patient/Family Anticipates Transition to home with family     Patient/Family Anticipated Services at Transition none     DME Needed Upon Discharge  none     Discharge Plan A Home     Discharge Plan B Home        Physical Activity    On average, how many days per week do you engage in moderate to strenuous exercise (like a brisk walk)? 0 days     On average, how many minutes do you engage in exercise at this level? 0 min        Financial Resource Strain    How hard is it for you to pay for the very basics like food, housing, medical care, and heating? Not very hard        Housing Stability    In the last 12 months, was there a time when you were not able to pay the mortgage or rent on time? No     In the last 12 months, how many places have you lived? 1     In the last 12 months, was there a time when you did not have a steady place to sleep or slept in a shelter (including now)? No        Transportation Needs    In the past 12 months, has lack of transportation kept you from medical appointments or from getting medications? No     In the past 12 months, has lack of transportation kept you from meetings, work, or from getting things needed for daily living? No        Food Insecurity    Within the past 12 months, you worried that your food would run out before you got the money to buy more. Never true     Within the past 12 months, the food you bought just didn't last and you didn't have money to get more. Never true        Stress    Do you feel stress - tense, restless, nervous, or  anxious, or unable to sleep at night because your mind is troubled all the time - these days? Not at all        Social Connections    In a typical week, how many times do you talk on the phone with family, friends, or neighbors? More than three times a week     How often do you get together with friends or relatives? Once a week     How often do you attend Jainism or Moravian services? Never     Do you belong to any clubs or organizations such as Jainism groups, unions, fraternal or athletic groups, or school groups? No     How often do you attend meetings of the clubs or organizations you belong to? Never     Are you , , , , never , or living with a partner?         Alcohol Use    Q1: How often do you have a drink containing alcohol? 2-4 times a month     Q2: How many drinks containing alcohol do you have on a typical day when you are drinking? 1 or 2     Q3: How often do you have six or more drinks on one occasion? Never                     Pt is a 68 y.o. male admitted with asymptomatic anemia and has a PMH of Anemia, HLD, HTN, kidney Tx and AFib on eliquis. He lives with his wife in a single story house , he has not required DME and is independent with his ADLs and iADLs and drives. He will have transportation home. [facility] Discharge Packet given to patient and/or family with understanding verbalized.   name and number and estimated discharge date written on white board in patient's room with request to call for any questions or concerns.  Will continue to follow for needs.  Discharge Plan A and Plan B have been determined by review of patient's clinical status, future medical and therapeutic needs, and coverage/benefits for post-acute care in coordination with multidisciplinary team members.  Tico Cisneros RN,BSN

## 2024-04-02 NOTE — CONSULTS
[facility]  Gastroenterology  Consult Note    Patient Name: Ravi Zamorano  MRN: [id number]  Admission Date: 4/1/2024  Hospital Length of Stay: 0 days  Code Status: Full Code   Attending Provider: Madonna Fuller MD   Consulting Provider: Madonna Guadalupe MD  Primary Care Physician: Mima Mack MD  Principal Problem:Symptomatic anemia    Inpatient consult to Gastroenterology  Consult performed by: Madonna Guadalupe MD  Consult ordered by: eLsa Shine NP  Reason for consult: Symptomatic Anemia        Subjective:     HPI:  68yoM w/hx of anemia, afib on Eliquis, CHF (EF 65%), ESRD s/p kidney transplant (2016), chronic hepatitis who presents to the hospital from clinic for symptomatic anemia. He reports ongoing lightheadedness/dizziness that started about 4 months ago but has worsened in the past several weeks. He also reports experiencing diarrhea that began about 6 weeks ago. He had stool studies done outpatient which was positive for H. Pylori. He reports DOSS and non-productive cough. He denies SOB at rest.      No prior hx of GI bleed. He denies melena or hematemesis. He does have constipation from time to time. He takes Eliquis daily. He previously took iron tablets but discontinued because he did not like how it made him feel. He denies NSAID use or Pepto bismol use. He does not smoke and drinks socially (<4 drinks/wk). He has never had an EGD. Last colonoscopy (12/2020) was unremarkable. LEAH in the ED negative for dark stool/blood.     Patient is hypertensive to 150s-180s. Other VSS. HDS. H/H 7.0/23.8 s/p 2u pRBC (Baseline Hgb ~9), , Iron 117, TIBC 209, Saturated iron 56, Transferrin 141, Ferritin 816, Folate and B12 WNL, BUN 27, ESR 62. ASA and Eliquis held on admission. Started on Clarithromycin, Amoxicillin, and Protonix for H. Pylori infection.     Past Medical History:   Diagnosis Date    Anxiety 7/29/2014    Arthritis     Bilateral diabetic retinopathy 2017    CKD  (chronic kidney disease) stage 2, GFR 60-89 ml/min 12/28/2016    CKD (chronic kidney disease) stage 4, GFR 15-29 ml/min 7/29/2014    Colon polyps 2014    Depression - situational 7/29/2014    Diabetes mellitus     Diabetes type 2 since 2000 7/29/2014    Diabetic neuropathy 7/29/2014    History of cardioversion 10/3/2019    History of hepatitis C, s/p successful Rx w/ SVR24 - 9/2017 7/29/2014    Genotype 1a, treatment naive 10/2014 liver biopsy - grade 1 / stage 1 Completed Harvoni w/ SVR    Hyperlipidemia 7/29/2014    Hypertension     Neuropathy     Organ transplant candidate 7/29/2014    Pancreatitis     S/P cadaveric kidney transplant 11/5/2016. ESRD secondary to HTN/DMII 11/5/2016    Stage 3a chronic kidney disease 12/28/2016    Type 2 diabetes mellitus with stage 3a chronic kidney disease, with long-term current use of insulin 7/29/2014       Past Surgical History:   Procedure Laterality Date    APPENDECTOMY      CATARACT EXTRACTION W/  INTRAOCULAR LENS IMPLANT Right 3/13/2024    Procedure: EXTRACTION, CATARACT, WITH IOL INSERTION;  Surgeon: Jennifer Palacio MD;  Location: [facility] OR;  Service: Ophthalmology;  Laterality: Right;    COLONOSCOPY N/A 12/21/2020    Procedure: COLONOSCOPY;  Surgeon: Tico Bell MD;  Location: [facility] ENDO ([address] [place] [address]);  Service: Endoscopy;  Laterality: N/A;  ok to hold eliquis per Dr. Valadez, see telephone encounter 11/13/2020-[place]  covid test 12/18 [place]    KIDNEY TRANSPLANT      TRANSESOPHAGEAL ECHOCARDIOGRAPHY N/A 8/26/2019    Procedure: ECHOCARDIOGRAM, TRANSESOPHAGEAL;  Surgeon: Dolores Diagnostic Provider;  Location: [facility] EP LAB;  Service: Cardiology;  Laterality: N/A;    TREATMENT OF CARDIAC ARRHYTHMIA N/A 8/26/2019    Procedure: CARDIOVERSION;  Surgeon: Bronson Bowden MD;  Location: [facility] EP LAB;  Service: Cardiology;  Laterality: N/A;  AF, FELICIANO, DCCV, MAC, GP, 3 PREP       Review of patient's allergies indicates:   Allergen Reactions    Nifedipine Other (See Comments)     Gingival  hyperplasia     Family History       Problem Relation (Age of Onset)    Cancer Brother    Diabetes Mother    Heart disease Mother, Father    Hypertension Mother, Brother          Tobacco Use    Smoking status: Former     Current packs/day: 0.00     Average packs/day: 0.5 packs/day for 32.0 years (16.0 ttl pk-yrs)     Types: Cigarettes     Start date: 1984     Quit date: 2016     Years since quittin.3    Smokeless tobacco: Never    Tobacco comments:     Pt reports that he quit in , but started up again in . pt reports he is currently working on quitting again   Substance and Sexual Activity    Alcohol use: Yes     Comment: Pt reports occasional beers on Sundays. Pt reports drinking daily prior to ESRD diagnosis.    Drug use: No    Sexual activity: Yes     Partners: Female     Review of Systems   Gastrointestinal:  Negative for abdominal pain, blood in stool, constipation, diarrhea, nausea and vomiting.   Neurological:  Negative for weakness and light-headedness.     Objective:     Vital Signs (Most Recent):  Temp: 98.6 °F (37 °C) (24 075)  Pulse: 98 (24 075)  Resp: 16 (24 075)  BP: (!) 179/83 (24 075)  SpO2: 98 % (24 075) Vital Signs (24h Range):  Temp:  [97.6 °F (36.4 °C)-98.7 °F (37.1 °C)] 98.6 °F (37 °C)  Pulse:  [64-98] 98  Resp:  [16-18] 16  SpO2:  [96 %-100 %] 98 %  BP: (132-194)/(59-92) 179/83     Weight: 86.7 kg (191 lb 1.2 oz) (24 2239)  Body mass index is 25.21 kg/m².      Intake/Output Summary (Last 24 hours) at 2024 0901  Last data filed at 2024 0209  Gross per 24 hour   Intake 1005 ml   Output --   Net 1005 ml       Lines/Drains/Airways       Drain  Duration                  Closed/Suction Drain 16 Right Abdomen Bulb 15 Fr. 2704 days              Peripheral Intravenous Line  Duration                  Peripheral IV - Single Lumen 24 1708 20 G Anterior;Distal;Left Upper Arm <1 day                     Physical  Exam  Constitutional:       General: He is not in acute distress.     Appearance: Normal appearance. He is not ill-appearing.   HENT:      Head: Normocephalic.      Right Ear: External ear normal.      Left Ear: External ear normal.      Mouth/Throat:      Mouth: Mucous membranes are moist.      Pharynx: Oropharynx is clear.   Eyes:      General: No scleral icterus.     Extraocular Movements: Extraocular movements intact.   Pulmonary:      Effort: Pulmonary effort is normal.   Abdominal:      General: Bowel sounds are normal. There is distension (Mildly distended).      Palpations: Abdomen is soft.      Tenderness: There is no abdominal tenderness.   Musculoskeletal:         General: No swelling.      Right lower leg: No edema.      Left lower leg: No edema.   Skin:     General: Skin is warm.      Coloration: Skin is not jaundiced.      Findings: No erythema or rash.   Neurological:      General: No focal deficit present.      Mental Status: He is alert and oriented to person, place, and time. Mental status is at baseline.   Psychiatric:         Mood and Affect: Mood normal.         Behavior: Behavior normal.         Thought Content: Thought content normal.          Significant Labs:  CBC:   Recent Labs   Lab 04/01/24  0940 04/01/24  1708 04/02/24  0445   WBC 3.92 3.57* 3.48*   HGB 6.0* 5.7* 7.0*   HCT 21.8* 20.1* 23.8*   * 136* 122*     ESR:   Recent Labs   Lab 04/02/24  0445   SEDRATE 62*     All pertinent lab results from the last 24 hours have been reviewed.    Significant Imaging:  Imaging results within the past 24 hours have been reviewed.  Assessment/Plan:     Oncology  * Symptomatic anemia  68yoM w/hx of anemia, afib on Eliquis, CHF (EF 65%), ESRD s/p kidney transplant (2016), chronic hepatitis who presents to the hospital from clinic for symptomatic anemia. He reports ongoing lightheadedness/dizziness. He also reports experiencing diarrhea that began about 6 weeks ago. Stool studies H. Pylori. Hgb  6.0 >> 5.7 on admission. S/p 2u pRBC. Denies abdominal pain, dyspepsia, N/V. LEAH negative. Iron WNL. Low suspicion for GI bleed at this time. Anemia is likely anemia of chronic disease 2/2 ESRD.     Plan:   - No endoscopic evaluation at this time  - Continue Triple Therapy for H. Pylori for 14 days   - Will see patient outpatient for eradication confirmation  - Trend Hgb. Transfuse for Hgb <7, unless otherwise indicated  - Please notify GI team if there is significant change in patient's clinical status        Thank you for your consult. I will sign off. Please contact us if you have any additional questions.    Madonna Bernabe MD  Gastroenterology  [facility]

## 2024-04-02 NOTE — ASSESSMENT & PLAN NOTE
Creatine stable for now. BMP reviewed- noted Estimated Creatinine Clearance: 54.7 mL/min (A) (based on SCr of 1.5 mg/dL (H)). according to latest data. Based on current GFR, CKD stage is stage 3 - GFR 30-59.  Monitor UOP and serial BMP and adjust therapy as needed. Renally dose meds. Avoid nephrotoxic medications and procedures.

## 2024-04-02 NOTE — ASSESSMENT & PLAN NOTE
Anticoagulant long-term use   Patient with Paroxysmal (<7 days) atrial fibrillation which is controlled currently with Beta Blocker. Patient is currently in atrial fibrillation.[id number] Score: 2. Anticoagulation indicated. Anticoagulation done with eliquis . Hold eliquis due to acute worsening of bl anemia and concern for bleeding.

## 2024-04-02 NOTE — ASSESSMENT & PLAN NOTE
Patient's FSGs are controlled on current hypoglycemics.   Last A1c reviewed-   Lab Results   Component Value Date    HGBA1C 8.3 (H) 03/11/2024     Most recent fingerstick glucose reviewed-   Recent Labs   Lab 04/02/24  0749 04/02/24  1154   POCTGLUCOSE 101 157*     Current correctional scale  Low  Maintain anti-hyperglycemic dose as follows-   Antihyperglycemics (From admission, onward)    Start     Stop Route Frequency Ordered    04/02/24 0900  insulin detemir U-100 (Levemir) pen 15 Units         -- SubQ Daily 04/01/24 1845    04/01/24 1923  insulin aspart U-100 pen 0-5 Units         -- SubQ Before meals & nightly PRN 04/01/24 1823      -Accuchecks AC/HS

## 2024-04-02 NOTE — PHARMACY MED REC
"    Admission Medication History     The home medication history was taken by Carmelo Padilla.    You may go to "Admission" then "Reconcile Home Medications" tabs to review and/or act upon these items.     The home medication list has been updated by the Pharmacy department.   Please read ALL comments highlighted in yellow.   Please address this information as you see fit.    Feel free to contact us if you have any questions or require assistance.      The medications listed below were removed from the home medication list. Please reorder if appropriate:  Patient reports no longer taking the following medication(s):  BENZONATATE 100 MG TABLET  LOMOTIL 2.5-0.025 MG TABLET  MELATONIN 5 MG TABLET  PANTOPRAZOLE 40 MG TABLET  PREDNISOLONE/GATIFLOX/BROMFENAC 1-0.5-0.075 % SOLUTION  SPIRONOLACTONE 25 MG TABLET    Medications listed below were obtained from: Patient/family and Analytic software- [employer] Medications   Medication Sig    apixaban (ELIQUIS) 5 mg Tab     Take 1 tablet (5 mg total) by mouth 2 (two) times daily.    aspirin 81 MG Chew   Take 81 mg by mouth once daily.    atorvastatin (LIPITOR) 40 MG tablet   Take 1 tablet (40 mg total) by mouth once daily.    carvediloL (COREG) 25 MG tablet   Take 1 tablet (25 mg total) by mouth 2 (two) times daily.    empagliflozin (JARDIANCE) 10 mg tablet   Take 1 tablet (10 mg total) by mouth once daily.    famotidine (PEPCID) 20 MG tablet   Take 1 tablet (20 mg total) by mouth every evening.    gabapentin (NEURONTIN) 300 MG capsule   Take 1 capsule by mouth in the morning, 1 capsule midday, and 3 capsules at night.    hydroCHLOROthiazide (HYDRODIURIL) 25 MG tablet   Take 1 tablet (25 mg total) by mouth once daily.    insulin (LANTUS SOLOSTAR U-100 INSULIN) glargine 100 units/mL SubQ pen   Inject 20 Units into the skin every morning.    insulin aspart U-100 (NOVOLOG) 100 unit/mL (3 mL) InPn pen   Inject 16 units w/ breakfast, 10 units at lunch and dinner scale 180-230+2, " "231-280+4, 281-330+6, 331-380+8, >380+10.  *Max daily 66 units.*  Patient is only using morning dose.      meclizine (ANTIVERT) 25 mg tablet   Take 1 tablet (25 mg total) by mouth 3 (three) times daily as needed for dizziness.    mycophenolate (CELLCEPT) 250 mg Cap   Take 4 capsules (1,000 mg total) by mouth 2 (two) times daily.    tacrolimus (PROGRAF) 0.5 MG Cap   Take 1 capsule (0.5 mg total) by mouth every 12 (twelve) hours.    valsartan (DIOVAN) 40 MG tablet   Take 1 tablet (40 mg total) by mouth once daily.    blood-glucose sensor Kayla     Gin 3, use as directed, change every 14 days , e 11.65    pen needle, diabetic (BD ULTRA-FINE LO PEN NEEDLE) 32 gauge x 5/32" Ndle   Use to administer insulin four times daily.      pen needle, diabetic (BD ULTRA-FINE LO PEN NEEDLE) 32 gauge x 5/32" Ndle   Use four times a daily.    predniSONE (DELTASONE) 5 MG tablet   Take 1 tablet (5 mg total) by mouth once daily.       Potential issues to be addressed PRIOR TO DISCHARGE  Please discuss with the patient barriers to adherence with medication treatment plans  Patient requires education regarding drug therapies     Carmelo Padilla  EXT [phone]              .          "

## 2024-04-02 NOTE — PLAN OF CARE
Problem: Adult Inpatient Plan of Care  Goal: Plan of Care Review  Outcome: Ongoing, Progressing  Goal: Patient-Specific Goal (Individualized)  Outcome: Ongoing, Progressing  Goal: Absence of Hospital-Acquired Illness or Injury  Outcome: Ongoing, Progressing  Goal: Optimal Comfort and Wellbeing  Outcome: Ongoing, Progressing  Goal: Readiness for Transition of Care  Outcome: Ongoing, Progressing     Problem: Diabetes Comorbidity  Goal: Blood Glucose Level Within Targeted Range  Outcome: Ongoing, Progressing     Problem: Fall Injury Risk  Goal: Absence of Fall and Fall-Related Injury  Outcome: Ongoing, Progressing     Problem: Hypertension Acute  Goal: Blood Pressure Within Desired Range  Outcome: Ongoing, Progressing     Problem: Infection  Goal: Absence of Infection Signs and Symptoms  Outcome: Ongoing, Progressing     Problem: Anemia  Goal: Anemia Symptom Improvement  Outcome: Ongoing, Progressing

## 2024-04-02 NOTE — ASSESSMENT & PLAN NOTE
Symptomatic anemia   Patient's anemia is currently uncontrolled. Has received 3 units of PRBCs on 4/1, 4/2 . Etiology likely d/t chronic disease due to Chronic Kidney Disease and immunosuppression  Patient denies hematemesis, melena, or hematochezia. Did have recent issues with diarrhea and was found to have h. Pylori but has not received tx.  Current CBC reviewed-   Lab Results   Component Value Date    HGB 7.0 (L) 04/02/2024    HCT 23.8 (L) 04/02/2024     Monitor serial CBC and transfuse if patient becomes hemodynamically unstable, symptomatic or H/H drops below 7/21.  -Anemia panel in process.  -LEAH in the ED revealed brown stool in rectal vault.  -Heme occult pending.  -restarted asa and eliquis  -GI consulted:    - No endoscopic evaluation at this time  - Continue Triple Therapy for H. Pylori for 14 days   - Will see patient outpatient for eradication confirmation  - KTM consulted for possible anemia/pancytopenia 2/2 immunosuppression  - Hgb 7.0 on am labs following 2u pRBCs, additional unit ordered  - continue to trend Hgb following pRBCs and re initiation of anticoagulants

## 2024-04-02 NOTE — SUBJECTIVE & OBJECTIVE
Interval History: Patient seen and assessed at bedside. Afebrile, hypertensive, otherwise VSS. Adjusting BP medications. Hgb 7.0 on am labs s/p 2u pRBCs. Additional unit ordered. GI consulted without concerns of GI cause - no endoscopic eval. KTM consulted for anemia possibly 2/2 immunosuppression. Continue trending Hgb.      Review of Systems   Constitutional:  Positive for fatigue (improving). Negative for appetite change, chills, diaphoresis and fever.   HENT:  Negative for congestion, rhinorrhea and sore throat.    Eyes:  Negative for photophobia and visual disturbance.   Respiratory:  Positive for cough. Negative for shortness of breath and wheezing.         +DSOS   Gastrointestinal:  Positive for diarrhea (improved). Negative for abdominal distention, abdominal pain, blood in stool, nausea and vomiting.   Genitourinary:  Negative for dysuria, frequency and hematuria.   Musculoskeletal:  Negative for back pain, myalgias and neck pain.   Skin:  Negative for color change, pallor, rash and wound.   Neurological:  Positive for dizziness (improving). Negative for weakness and headaches.   Psychiatric/Behavioral:  Negative for confusion and hallucinations. The patient is not nervous/anxious.      Objective:     Vital Signs (Most Recent):  Temp: 98.2 °F (36.8 °C) (04/02/24 1155)  Pulse: 73 (04/02/24 1155)  Resp: 16 (04/02/24 1155)  BP: (!) 170/90 (04/02/24 1155)  SpO2: 99 % (04/02/24 1155) Vital Signs (24h Range):  Temp:  [97.6 °F (36.4 °C)-98.7 °F (37.1 °C)] 98.2 °F (36.8 °C)  Pulse:  [64-98] 73  Resp:  [16-18] 16  SpO2:  [96 %-100 %] 99 %  BP: (132-194)/(59-92) 170/90     Weight: 86.7 kg (191 lb 1.2 oz)  Body mass index is 25.21 kg/m².    Intake/Output Summary (Last 24 hours) at 4/2/2024 1257  Last data filed at 4/2/2024 0209  Gross per 24 hour   Intake 1005 ml   Output --   Net 1005 ml         Physical Exam  Vitals and nursing note reviewed.   Constitutional:       General: He is not in acute distress.      Appearance: He is not toxic-appearing or diaphoretic.   HENT:      Head: Normocephalic and atraumatic.      Nose: Nose normal.      Mouth/Throat:      Mouth: Mucous membranes are moist.   Eyes:      Pupils: Pupils are equal, round, and reactive to light.   Cardiovascular:      Rate and Rhythm: Normal rate. Rhythm irregularly irregular.      Pulses: Normal pulses.   Pulmonary:      Effort: Pulmonary effort is normal. No respiratory distress.      Breath sounds: No wheezing, rhonchi or rales.   Abdominal:      General: Bowel sounds are normal. There is no distension.      Palpations: Abdomen is soft.      Tenderness: There is no abdominal tenderness. There is no guarding.   Musculoskeletal:         General: Normal range of motion.      Cervical back: Normal range of motion.      Right lower leg: No edema.      Left lower leg: No edema.   Skin:     General: Skin is warm and dry.      Capillary Refill: Capillary refill takes less than 2 seconds.   Neurological:      General: No focal deficit present.      Mental Status: He is alert and oriented to person, place, and time.      Sensory: No sensory deficit.      Motor: No weakness.   Psychiatric:         Mood and Affect: Mood normal.         Behavior: Behavior normal.             Significant Labs: All pertinent labs within the past 24 hours have been reviewed.  CBC:   Recent Labs   Lab 04/01/24  0940 04/01/24  1708 04/02/24  0445   WBC 3.92 3.57* 3.48*   HGB 6.0* 5.7* 7.0*   HCT 21.8* 20.1* 23.8*   * 136* 122*       Significant Imaging: I have reviewed all pertinent imaging results/findings within the past 24 hours.

## 2024-04-02 NOTE — SUBJECTIVE & OBJECTIVE
Past Medical History:   Diagnosis Date    Anxiety 7/29/2014    Arthritis     Bilateral diabetic retinopathy 2017    CKD (chronic kidney disease) stage 2, GFR 60-89 ml/min 12/28/2016    CKD (chronic kidney disease) stage 4, GFR 15-29 ml/min 7/29/2014    Colon polyps 2014    Depression - situational 7/29/2014    Diabetes mellitus     Diabetes type 2 since 2000 7/29/2014    Diabetic neuropathy 7/29/2014    History of cardioversion 10/3/2019    History of hepatitis C, s/p successful Rx w/ SVR24 - 9/2017 7/29/2014    Genotype 1a, treatment naive 10/2014 liver biopsy - grade 1 / stage 1 Completed Harvoni w/ SVR    Hyperlipidemia 7/29/2014    Hypertension     Neuropathy     Organ transplant candidate 7/29/2014    Pancreatitis     S/P cadaveric kidney transplant 11/5/2016. ESRD secondary to HTN/DMII 11/5/2016    Stage 3a chronic kidney disease 12/28/2016    Type 2 diabetes mellitus with stage 3a chronic kidney disease, with long-term current use of insulin 7/29/2014       Past Surgical History:   Procedure Laterality Date    APPENDECTOMY      CATARACT EXTRACTION W/  INTRAOCULAR LENS IMPLANT Right 3/13/2024    Procedure: EXTRACTION, CATARACT, WITH IOL INSERTION;  Surgeon: Jennifer Palacio MD;  Location: [facility] OR;  Service: Ophthalmology;  Laterality: Right;    COLONOSCOPY N/A 12/21/2020    Procedure: COLONOSCOPY;  Surgeon: Tico Bell MD;  Location: [facility] ENDO ([address] [place] [address]);  Service: Endoscopy;  Laterality: N/A;  ok to hold eliquis per Dr. Valadez, see telephone encounter 11/13/2020-MS  covid test 12/18 [place]    KIDNEY TRANSPLANT      TRANSESOPHAGEAL ECHOCARDIOGRAPHY N/A 8/26/2019    Procedure: ECHOCARDIOGRAM, TRANSESOPHAGEAL;  Surgeon: Dolores Diagnostic Provider;  Location: [facility] EP LAB;  Service: Cardiology;  Laterality: N/A;    TREATMENT OF CARDIAC ARRHYTHMIA N/A 8/26/2019    Procedure: CARDIOVERSION;  Surgeon: Bronson Bowden MD;  Location: [facility] EP LAB;  Service: Cardiology;  Laterality: N/A;  AF, FELICIANO, DCCV, MAC, GP, 3  PREP       Review of patient's allergies indicates:   Allergen Reactions    Nifedipine Other (See Comments)     Gingival hyperplasia     Family History       Problem Relation (Age of Onset)    Cancer Brother    Diabetes Mother    Heart disease Mother, Father    Hypertension Mother, Brother          Tobacco Use    Smoking status: Former     Current packs/day: 0.00     Average packs/day: 0.5 packs/day for 32.0 years (16.0 ttl pk-yrs)     Types: Cigarettes     Start date: 1984     Quit date: 2016     Years since quittin.3    Smokeless tobacco: Never    Tobacco comments:     Pt reports that he quit in , but started up again in . pt reports he is currently working on quitting again   Substance and Sexual Activity    Alcohol use: Yes     Comment: Pt reports occasional beers on Sundays. Pt reports drinking daily prior to ESRD diagnosis.    Drug use: No    Sexual activity: Yes     Partners: Female     Review of Systems   Gastrointestinal:  Negative for abdominal pain, blood in stool, constipation, diarrhea, nausea and vomiting.   Neurological:  Negative for weakness and light-headedness.     Objective:     Vital Signs (Most Recent):  Temp: 98.6 °F (37 °C) (24 075)  Pulse: 98 (24 075)  Resp: 16 (24)  BP: (!) 179/83 (24)  SpO2: 98 % (24) Vital Signs (24h Range):  Temp:  [97.6 °F (36.4 °C)-98.7 °F (37.1 °C)] 98.6 °F (37 °C)  Pulse:  [64-98] 98  Resp:  [16-18] 16  SpO2:  [96 %-100 %] 98 %  BP: (132-194)/(59-92) 179/83     Weight: 86.7 kg (191 lb 1.2 oz) (249)  Body mass index is 25.21 kg/m².      Intake/Output Summary (Last 24 hours) at 2024 0901  Last data filed at 2024 0209  Gross per 24 hour   Intake 1005 ml   Output --   Net 1005 ml       Lines/Drains/Airways       Drain  Duration                  Closed/Suction Drain 16 Right Abdomen Bulb 15 Fr. 2704 days              Peripheral Intravenous Line  Duration                   Peripheral IV - Single Lumen 04/01/24 1708 20 G Anterior;Distal;Left Upper Arm <1 day                     Physical Exam  Constitutional:       General: He is not in acute distress.     Appearance: Normal appearance. He is not ill-appearing.   HENT:      Head: Normocephalic.      Right Ear: External ear normal.      Left Ear: External ear normal.      Mouth/Throat:      Mouth: Mucous membranes are moist.      Pharynx: Oropharynx is clear.   Eyes:      General: No scleral icterus.     Extraocular Movements: Extraocular movements intact.   Pulmonary:      Effort: Pulmonary effort is normal.   Abdominal:      General: Bowel sounds are normal. There is distension (Mildly distended).      Palpations: Abdomen is soft.      Tenderness: There is no abdominal tenderness.   Musculoskeletal:         General: No swelling.      Right lower leg: No edema.      Left lower leg: No edema.   Skin:     General: Skin is warm.      Coloration: Skin is not jaundiced.      Findings: No erythema or rash.   Neurological:      General: No focal deficit present.      Mental Status: He is alert and oriented to person, place, and time. Mental status is at baseline.   Psychiatric:         Mood and Affect: Mood normal.         Behavior: Behavior normal.         Thought Content: Thought content normal.          Significant Labs:  CBC:   Recent Labs   Lab 04/01/24  0940 04/01/24  1708 04/02/24  0445   WBC 3.92 3.57* 3.48*   HGB 6.0* 5.7* 7.0*   HCT 21.8* 20.1* 23.8*   * 136* 122*     ESR:   Recent Labs   Lab 04/02/24  0445   SEDRATE 62*     All pertinent lab results from the last 24 hours have been reviewed.    Significant Imaging:  Imaging results within the past 24 hours have been reviewed.

## 2024-04-02 NOTE — HPI
68yoM w/hx of anemia, afib on Eliquis, CHF (EF 65%), ESRD s/p kidney transplant (2016), chronic hepatitis who presents to the hospital from clinic for symptomatic anemia. He reports ongoing lightheadedness/dizziness that started about 4 months ago but has worsened in the past several weeks. He also reports experiencing diarrhea that began about 6 weeks ago. He had stool studies done outpatient which was positive for H. Pylori. He reports DOSS and non-productive cough. He denies SOB at rest.      No prior hx of GI bleed. He denies melena or hematemesis. He does have constipation from time to time. He takes Eliquis daily. He previously took iron tablets but discontinued because he did not like how it made him feel. He denies NSAID use or Pepto bismol use. He does not smoke and drinks socially (<4 drinks/wk). He has never had an EGD. Last colonoscopy (12/2020) was unremarkable. LEAH in the ED negative for dark stool/blood.     Patient is hypertensive to 150s-180s. Other VSS. HDS. H/H 7.0/23.8 s/p 2u pRBC (Baseline Hgb ~9), , Iron 117, TIBC 209, Saturated iron 56, Transferrin 141, Ferritin 816, Folate and B12 WNL, BUN 27, ESR 62. ASA and Eliquis held on admission. Started on Clarithromycin, Amoxicillin, and Protonix for H. Pylori infection.

## 2024-04-02 NOTE — PLAN OF CARE
Inpatient Upgrade Note    Ravi Zamorano has warranted treatment spanning two or more midnights of hospital level care for the management of anemia. He continues to require daily labs, monitoring of vital signs, medication adjustments, further evaluation by consultants, and Transfusions . His condition is also complicated by the following comorbidities: Hypertension, Chronic kidney disease, Heart failure, and S/p kidney transplant 2016 .

## 2024-04-02 NOTE — PROGRESS NOTES
[facility] [address] [place] [address] Medicine  Progress Note    Patient Name: Ravi Zamorano  MRN: [id number]  Patient Class: OP- Observation   Admission Date: 4/1/2024  Length of Stay: 0 days  Attending Physician: Madonna Fuller MD  Primary Care Provider: Mima Mack MD        Subjective:     Principal Problem:Pancytopenia        HPI:  Ravi Zamorano is a 68 y.o. male with a PMHx of anemia, HLD, HTN, a fib on eliquis, DM2, neuropathy, CHF (EF 65%), ESRD S/p kidney transplant 2016, and CKD3 who presents to the ED for abnormal outpatient labs. The patient reports ongoing lightheadedness/dizziness for at least 4 months noting it has worsened in the past several weeks. The patient also notes persistent non productive for about 6 weeks. Denies shortness of breath at rest. Endorses DOSS for the past month. The patient also had been experiencing diarrhea that began 6 weeks ago and completed outpatient stool studies and was prescribed lomotil. He reports the diarrhea has improved and states he stool is soft but not loose anymore. Denies melena or hematochezia. The patient reports his BP has been more elevated recently and was restarted on a lower dose of valsartan today, which he hasn't taken yet. He denies fever/chills, N/V, abdominal pain, CP, or dysuria.    In the ED, patient hypertensive otherwise vitals stable, afebrile. WBC 3.57. Hgb 5.7 (9.1 previously 11/2023). Plt 136 (chronic, stable). Na 131. Cr 1.7 (bl 1.7-1.8). Albumin 3.3. Glucose 208. Anemia panel in process. EKG shows a fib, 76 bpm, no ST elevation or depression. Type & screen completed. 2u PRBCs ordered. LEAH performed by ED provider revealed brown stool in rectal vault.    Overview/Hospital Course:  Ravi Zamorano is a 68 y.o.M who was placed in observation for symptomatic anemia. Findings of pancytopenia with Hgb 5.7 on admission. Given 2u pRBCs in ED with improvement to 7. Transfused another 1u pRBCs (3u total). Monitor CBC. Patient  recently with diarrhea symptoms with positive H. Pylori sample - patient unaware of lab findings. No longer having diarrheal symptoms, but started on clarithromycin, amoxicillin, and PPI for tx. GI consulted for evaluation of anemia - low suspicion for GI bleed, no endoscopic eval at this time. KTM consulted for history of kidney transplant and possible anemia 2/2 immunosuppression. Will discharge patient when medically ready.     Interval History: Patient seen and assessed at bedside. Afebrile, hypertensive, otherwise VSS. Adjusting BP medications. Hgb 7.0 on am labs s/p 2u pRBCs. Additional unit ordered. GI consulted without concerns of GI cause - no endoscopic eval. KTM consulted for anemia possibly 2/2 immunosuppression. Continue trending Hgb.      Review of Systems   Constitutional:  Positive for fatigue (improving). Negative for appetite change, chills, diaphoresis and fever.   HENT:  Negative for congestion, rhinorrhea and sore throat.    Eyes:  Negative for photophobia and visual disturbance.   Respiratory:  Positive for cough. Negative for shortness of breath and wheezing.         +DOSS   Gastrointestinal:  Positive for diarrhea (improved). Negative for abdominal distention, abdominal pain, blood in stool, nausea and vomiting.   Genitourinary:  Negative for dysuria, frequency and hematuria.   Musculoskeletal:  Negative for back pain, myalgias and neck pain.   Skin:  Negative for color change, pallor, rash and wound.   Neurological:  Positive for dizziness (improving). Negative for weakness and headaches.   Psychiatric/Behavioral:  Negative for confusion and hallucinations. The patient is not nervous/anxious.      Objective:     Vital Signs (Most Recent):  Temp: 98.2 °F (36.8 °C) (04/02/24 1155)  Pulse: 73 (04/02/24 1155)  Resp: 16 (04/02/24 1155)  BP: (!) 170/90 (04/02/24 1155)  SpO2: 99 % (04/02/24 1155) Vital Signs (24h Range):  Temp:  [97.6 °F (36.4 °C)-98.7 °F (37.1 °C)] 98.2 °F (36.8 °C)  Pulse:   [64-98] 73  Resp:  [16-18] 16  SpO2:  [96 %-100 %] 99 %  BP: (132-194)/(59-92) 170/90     Weight: 86.7 kg (191 lb 1.2 oz)  Body mass index is 25.21 kg/m².    Intake/Output Summary (Last 24 hours) at 4/2/2024 1257  Last data filed at 4/2/2024 0209  Gross per 24 hour   Intake 1005 ml   Output --   Net 1005 ml         Physical Exam  Vitals and nursing note reviewed.   Constitutional:       General: He is not in acute distress.     Appearance: He is not toxic-appearing or diaphoretic.   HENT:      Head: Normocephalic and atraumatic.      Nose: Nose normal.      Mouth/Throat:      Mouth: Mucous membranes are moist.   Eyes:      Pupils: Pupils are equal, round, and reactive to light.   Cardiovascular:      Rate and Rhythm: Normal rate. Rhythm irregularly irregular.      Pulses: Normal pulses.   Pulmonary:      Effort: Pulmonary effort is normal. No respiratory distress.      Breath sounds: No wheezing, rhonchi or rales.   Abdominal:      General: Bowel sounds are normal. There is no distension.      Palpations: Abdomen is soft.      Tenderness: There is no abdominal tenderness. There is no guarding.   Musculoskeletal:         General: Normal range of motion.      Cervical back: Normal range of motion.      Right lower leg: No edema.      Left lower leg: No edema.   Skin:     General: Skin is warm and dry.      Capillary Refill: Capillary refill takes less than 2 seconds.   Neurological:      General: No focal deficit present.      Mental Status: He is alert and oriented to person, place, and time.      Sensory: No sensory deficit.      Motor: No weakness.   Psychiatric:         Mood and Affect: Mood normal.         Behavior: Behavior normal.             Significant Labs: All pertinent labs within the past 24 hours have been reviewed.  CBC:   Recent Labs   Lab 04/01/24  0940 04/01/24  1708 04/02/24  0445   WBC 3.92 3.57* 3.48*   HGB 6.0* 5.7* 7.0*   HCT 21.8* 20.1* 23.8*   * 136* 122*       Significant Imaging: I  have reviewed all pertinent imaging results/findings within the past 24 hours.    Assessment/Plan:      * Pancytopenia  Symptomatic anemia   Patient's anemia is currently uncontrolled. Has received 3 units of PRBCs on 4/1, 4/2 . Etiology likely d/t chronic disease due to Chronic Kidney Disease and immunosuppression  Patient denies hematemesis, melena, or hematochezia. Did have recent issues with diarrhea and was found to have h. Pylori but has not received tx.  Current CBC reviewed-   Lab Results   Component Value Date    HGB 7.0 (L) 04/02/2024    HCT 23.8 (L) 04/02/2024     Monitor serial CBC and transfuse if patient becomes hemodynamically unstable, symptomatic or H/H drops below 7/21.  -Anemia panel in process.  -LEAH in the ED revealed brown stool in rectal vault.  -Heme occult pending.  -restarted asa and eliquis  -GI consulted:    - No endoscopic evaluation at this time  - Continue Triple Therapy for H. Pylori for 14 days   - Will see patient outpatient for eradication confirmation  - KTM consulted for possible anemia/pancytopenia 2/2 immunosuppression  - Hgb 7.0 on am labs following 2u pRBCs, additional unit ordered  - continue to trend Hgb following pRBCs and re initiation of anticoagulants    H. pylori infection  -Patient with diarrhea beginning mid February. Had stool studies outpatient. Reports he took lomotil. States stool is still soft but not as loose as before.  -H pylori infection noted and appears protonix and clarithromycin were ordered, but pt reports he never received the test results and did not know about the prescriptions.  -Will treat with clarithromycin 500mg bid, amoxicillin 1g bid, and protonix 40mg bid x14 days.    Thrombocytopenia, unspecified  Patient was found to have thrombocytopenia, the likely etiology is primary from bone marrow suppression, will monitor the platelets Daily. Will transfuse if platelet count is <50k (if undergoing surgical procedure or have active bleeding). Hold DVT  prophylaxis if platelets are <50k. The patient's platelet results have been reviewed and are listed below.  Recent Labs   Lab 04/01/24  1708   *       Paroxysmal A-fib  Anticoagulant long-term use   Patient with Paroxysmal (<7 days) atrial fibrillation which is controlled currently with Beta Blocker. Patient is currently in atrial fibrillation.[id number] Score: 2. Anticoagulation indicated. Anticoagulation done with eliquis . Hold eliquis due to acute worsening of bl anemia and concern for bleeding.    PAD (peripheral artery disease)  -Chronic issue.  -Continue statin. Hold ASA and eliquis due to concern for bleeding.    Stage 3 chronic kidney disease  Creatine stable for now. BMP reviewed- noted Estimated Creatinine Clearance: 54.7 mL/min (A) (based on SCr of 1.5 mg/dL (H)). according to latest data. Based on current GFR, CKD stage is stage 3 - GFR 30-59.  Monitor UOP and serial BMP and adjust therapy as needed. Renally dose meds. Avoid nephrotoxic medications and procedures.    S/P cadaveric kidney transplant 11/5/2016. ESRD secondary to HTN/DMII  Long-term use of immunosuppressant medication   -Tacrolimus 0.5mg twice daily, Prednisone 5mg daily, and Mycophenolate 250mg 4 caps twice daily; hold mycophenolate for d/t h pylori infection.  -tacrolimus level daily  -KTM consulted, appreciate assistance.    Chronic diastolic congestive heart failure  Patient is identified as having Diastolic (HFpEF) heart failure that is Chronic. CHF is currently controlled. Latest ECHO performed and demonstrates- Results for orders placed during the hospital encounter of 07/10/22    Echo Saline Bubble? No    Interpretation Summary  · The left ventricle is normal in size with eccentric hypertrophy and normal systolic function. The estimated ejection fraction is 65%.  · Normal right ventricular size with normal right ventricular systolic function.  · Left ventricular diastolic dysfunction.  · Biatrial enlargement.  · Mild aortic  regurgitation.  · PA systolic pressure is at elast 39 mmHg. The IVC is not visualized.  . Continue Beta Blocker, ACE/ARB, and Aldactone and monitor clinical status closely. Monitor on telemetry. Patient is off CHF pathway.  Monitor strict Is&Os and daily weights. Cardiology has not been consulted. Continue to stress to patient importance of self efficacy and  on diet for CHF.     Hyperlipidemia   Patient is chronically on statin.will continue for now. Monitor clinically. Last LDL was   Lab Results   Component Value Date    LDLCALC 61.4 (L) 06/07/2023       Hypertension since 2000  Chronic, uncontrolled. Latest blood pressure and vitals reviewed-     Temp:  [97.6 °F (36.4 °C)-98.7 °F (37.1 °C)]   Pulse:  [64-98]   Resp:  [16-18]   BP: (132-194)/(59-92)   SpO2:  [96 %-100 %] .   Home meds for hypertension were reviewed and noted below.   Hypertension Medications               hydroCHLOROthiazide (HYDRODIURIL) 25 MG tablet Take 1 tablet (25 mg total) by mouth once daily.    carvediloL (COREG) 25 MG tablet Take 1 tablet (25 mg total) by mouth 2 (two) times daily.    valsartan (DIOVAN) 40 MG tablet Take 1 tablet (40 mg total) by mouth once daily.            While in the hospital, will manage blood pressure as follows; Continue home antihypertensive regimen. Patient reports valsartan was restarted today due to elevated BP.  - discontinued HCTZ due to associated risk of anemia  - increased valsartan dose to 80mg daily, increase as needed    Will utilize p.r.n. blood pressure medication only if patient's blood pressure greater than 180/110 and he develops symptoms such as worsening chest pain or shortness of breath.    Diabetic polyneuropathy associated with type 2 diabetes mellitus  -Chronic issue.  -Continue home gabapentin.    Type 2 diabetes mellitus with stage 3a chronic kidney disease, with long-term current use of insulin  Patient's FSGs are controlled on current hypoglycemics.   Last A1c reviewed-   Lab  Results   Component Value Date    HGBA1C 8.3 (H) 03/11/2024     Most recent fingerstick glucose reviewed-   Recent Labs   Lab 04/02/24  0749 04/02/24  1154   POCTGLUCOSE 101 157*     Current correctional scale  Low  Maintain anti-hyperglycemic dose as follows-   Antihyperglycemics (From admission, onward)      Start     Stop Route Frequency Ordered    04/02/24 0900  insulin detemir U-100 (Levemir) pen 15 Units         -- SubQ Daily 04/01/24 1845    04/01/24 1923  insulin aspart U-100 pen 0-5 Units         -- SubQ Before meals & nightly PRN 04/01/24 1823        -Accuchecks AC/HS      VTE Risk Mitigation (From admission, onward)           Ordered     apixaban tablet 5 mg  2 times daily         04/02/24 0943     IP VTE HIGH RISK PATIENT  Once         04/01/24 1831     Place sequential compression device  Until discontinued         04/01/24 1831                    Discharge Planning   UBALDO: 4/3/2024     Code Status: Full Code   Is the patient medically ready for discharge?: No    Reason for patient still in hospital (select all that apply): Patient trending condition, Laboratory test, Treatment, and Consult recommendations  Discharge Plan A: Home                  Aditi Walton PA-C  Department of Hospital Medicine   [facility] 11H

## 2024-04-02 NOTE — NURSING
Nurses Note -- 4 Eyes      4/1/2024   10:43 PM      Skin assessed during: Admit      [x] No Altered Skin Integrity Present    [x]Prevention Measures Documented      [] Yes- Altered Skin Integrity Present or Discovered   [] LDA Added if Not in Epic (Describe Wound)   [] New Altered Skin Integrity was Present on Admit and Documented in LDA   [] Wound Image Taken    Wound Care Consulted? No    Attending Nurse:  Alexi Potts RN/Staff Member:  Lacey

## 2024-04-02 NOTE — ASSESSMENT & PLAN NOTE
68yoM w/hx of anemia, afib on Eliquis, CHF (EF 65%), ESRD s/p kidney transplant (2016), chronic hepatitis who presents to the hospital from clinic for symptomatic anemia. He reports ongoing lightheadedness/dizziness. He also reports experiencing diarrhea that began about 6 weeks ago. Stool studies H. Pylori. Hgb 6.0 >> 5.7 on admission. S/p 2u pRBC. Denies abdominal pain, dyspepsia, N/V. LEAH negative. Iron WNL. Low suspicion for GI bleed at this time. Anemia is likely anemia of chronic disease 2/2 ESRD.     Plan:   - No endoscopic evaluation at this time  - Continue Triple Therapy for H. Pylori for 14 days   - Will see patient outpatient for eradication confirmation  - Trend Hgb. Transfuse for Hgb <7, unless otherwise indicated  - Please notify GI team if there is significant change in patient's clinical status

## 2024-04-02 NOTE — HOSPITAL COURSE
Ravi Zamorano is a 68 y.o.M who was placed in observation for symptomatic anemia. Findings of pancytopenia with Hgb 5.7 on admission. Given 2u pRBCs in ED with improvement to 7. Transfused another 1u pRBCs (3u total). Monitor CBC - improved. Patient asymptomatic. Patient recently with diarrhea symptoms with positive H. Pylori sample - patient unaware of lab findings. No longer having diarrheal symptoms, but started on levofloxacin, amoxicillin, and PPI for tx. GI consulted for evaluation of anemia - low suspicion for GI bleed, no endoscopic eval at this time. KTM consulted for history of kidney transplant. Held prograf for increased tacro level.  Patient medically ready for discharge. Plan to follow up with PCP, heme, EP for afib. Follow up labs in 1 week. Return precautions provided. Patient was seen and assessed on day of discharge. Plan of care discussed with patient, patient agreeable with plan, and all questions answered.    Physical Exam  Gen: in NAD, appears stated age  Neuro: mental status at baseline, motor, sensory, and strength grossly intact BL  HEENT: EOMI, PERRLA; no JVD appreciated  CVS: RRR, no m/r/g  Resp: lungs CTAB, no w/r/r; no belabored breathing or accessory muscle use appreciated   Abd: NTND, soft to palpation  Extrem: no UE or LE edema BL

## 2024-04-02 NOTE — ASSESSMENT & PLAN NOTE
Chronic, uncontrolled. Latest blood pressure and vitals reviewed-     Temp:  [97.6 °F (36.4 °C)-98.7 °F (37.1 °C)]   Pulse:  [64-98]   Resp:  [16-18]   BP: (132-194)/(59-92)   SpO2:  [96 %-100 %] .   Home meds for hypertension were reviewed and noted below.   Hypertension Medications               hydroCHLOROthiazide (HYDRODIURIL) 25 MG tablet Take 1 tablet (25 mg total) by mouth once daily.    carvediloL (COREG) 25 MG tablet Take 1 tablet (25 mg total) by mouth 2 (two) times daily.    valsartan (DIOVAN) 40 MG tablet Take 1 tablet (40 mg total) by mouth once daily.            While in the hospital, will manage blood pressure as follows; Continue home antihypertensive regimen. Patient reports valsartan was restarted today due to elevated BP.  - discontinued HCTZ due to associated risk of anemia  - increased valsartan dose to 80mg daily, increase as needed    Will utilize p.r.n. blood pressure medication only if patient's blood pressure greater than 180/110 and he develops symptoms such as worsening chest pain or shortness of breath.

## 2024-04-02 NOTE — ASSESSMENT & PLAN NOTE
-Patient with diarrhea beginning mid February. Had stool studies outpatient. Reports he took lomotil. States stool is still soft but not as loose as before.  -H pylori infection noted and appears protonix and clarithromycin were ordered, but pt reports he never received the test results and did not know about the prescriptions.  -Will treat with clarithromycin 500mg bid, amoxicillin 1g bid, and protonix 40mg bid x14 days.

## 2024-04-03 ENCOUNTER — TELEPHONE (OUTPATIENT)
Dept: INTERNAL MEDICINE | Facility: CLINIC | Age: 69
End: 2024-04-03
Payer: MEDICARE

## 2024-04-03 VITALS
OXYGEN SATURATION: 100 % | RESPIRATION RATE: 18 BRPM | HEIGHT: 73 IN | BODY MASS INDEX: 25.32 KG/M2 | SYSTOLIC BLOOD PRESSURE: 167 MMHG | WEIGHT: 191.06 LBS | DIASTOLIC BLOOD PRESSURE: 87 MMHG | HEART RATE: 63 BPM | TEMPERATURE: 99 F

## 2024-04-03 PROBLEM — Z94.0 KIDNEY REPLACED BY TRANSPLANT: Status: ACTIVE | Noted: 2024-04-03

## 2024-04-03 LAB
ALBUMIN SERPL BCP-MCNC: 3.1 G/DL (ref 3.5–5.2)
ALP SERPL-CCNC: 48 U/L (ref 55–135)
ALT SERPL W/O P-5'-P-CCNC: <5 U/L (ref 10–44)
ANION GAP SERPL CALC-SCNC: 7 MMOL/L (ref 8–16)
AST SERPL-CCNC: 10 U/L (ref 10–40)
BASOPHILS # BLD AUTO: 0.01 K/UL (ref 0–0.2)
BASOPHILS # BLD AUTO: 0.02 K/UL (ref 0–0.2)
BASOPHILS NFR BLD: 0.2 % (ref 0–1.9)
BASOPHILS NFR BLD: 0.4 % (ref 0–1.9)
BILIRUB SERPL-MCNC: 0.6 MG/DL (ref 0.1–1)
BUN SERPL-MCNC: 29 MG/DL (ref 8–23)
CALCIUM SERPL-MCNC: 8.5 MG/DL (ref 8.7–10.5)
CHLORIDE SERPL-SCNC: 103 MMOL/L (ref 95–110)
CO2 SERPL-SCNC: 23 MMOL/L (ref 23–29)
CREAT SERPL-MCNC: 1.7 MG/DL (ref 0.5–1.4)
DIFFERENTIAL METHOD BLD: ABNORMAL
DIFFERENTIAL METHOD BLD: ABNORMAL
EOSINOPHIL # BLD AUTO: 0.1 K/UL (ref 0–0.5)
EOSINOPHIL # BLD AUTO: 0.1 K/UL (ref 0–0.5)
EOSINOPHIL NFR BLD: 1.5 % (ref 0–8)
EOSINOPHIL NFR BLD: 1.7 % (ref 0–8)
ERYTHROCYTE [DISTWIDTH] IN BLOOD BY AUTOMATED COUNT: 15.6 % (ref 11.5–14.5)
ERYTHROCYTE [DISTWIDTH] IN BLOOD BY AUTOMATED COUNT: 15.7 % (ref 11.5–14.5)
EST. GFR  (NO RACE VARIABLE): 43.9 ML/MIN/1.73 M^2
GLUCOSE SERPL-MCNC: 99 MG/DL (ref 70–110)
HCT VFR BLD AUTO: 28.4 % (ref 40–54)
HCT VFR BLD AUTO: 28.9 % (ref 40–54)
HGB BLD-MCNC: 8.6 G/DL (ref 14–18)
HGB BLD-MCNC: 8.7 G/DL (ref 14–18)
IMM GRANULOCYTES # BLD AUTO: 0.08 K/UL (ref 0–0.04)
IMM GRANULOCYTES # BLD AUTO: 0.13 K/UL (ref 0–0.04)
IMM GRANULOCYTES NFR BLD AUTO: 1.7 % (ref 0–0.5)
IMM GRANULOCYTES NFR BLD AUTO: 2.5 % (ref 0–0.5)
LYMPHOCYTES # BLD AUTO: 0.2 K/UL (ref 1–4.8)
LYMPHOCYTES # BLD AUTO: 0.3 K/UL (ref 1–4.8)
LYMPHOCYTES NFR BLD: 3.1 % (ref 18–48)
LYMPHOCYTES NFR BLD: 6.9 % (ref 18–48)
MAGNESIUM SERPL-MCNC: 1.7 MG/DL (ref 1.6–2.6)
MCH RBC QN AUTO: 24.4 PG (ref 27–31)
MCH RBC QN AUTO: 24.8 PG (ref 27–31)
MCHC RBC AUTO-ENTMCNC: 30.1 G/DL (ref 32–36)
MCHC RBC AUTO-ENTMCNC: 30.3 G/DL (ref 32–36)
MCV RBC AUTO: 81 FL (ref 82–98)
MCV RBC AUTO: 82 FL (ref 82–98)
MONOCYTES # BLD AUTO: 0.7 K/UL (ref 0.3–1)
MONOCYTES # BLD AUTO: 1.2 K/UL (ref 0.3–1)
MONOCYTES NFR BLD: 14 % (ref 4–15)
MONOCYTES NFR BLD: 25.6 % (ref 4–15)
NEUTROPHILS # BLD AUTO: 3 K/UL (ref 1.8–7.7)
NEUTROPHILS # BLD AUTO: 4 K/UL (ref 1.8–7.7)
NEUTROPHILS NFR BLD: 63.9 % (ref 38–73)
NEUTROPHILS NFR BLD: 78.5 % (ref 38–73)
NRBC BLD-RTO: 0 /100 WBC
NRBC BLD-RTO: 0 /100 WBC
OB PNL STL: NEGATIVE
OHS QRS DURATION: 116 MS
OHS QTC CALCULATION: 462 MS
PLATELET # BLD AUTO: 123 K/UL (ref 150–450)
PLATELET # BLD AUTO: 129 K/UL (ref 150–450)
PMV BLD AUTO: ABNORMAL FL (ref 9.2–12.9)
PMV BLD AUTO: ABNORMAL FL (ref 9.2–12.9)
POCT GLUCOSE: 166 MG/DL (ref 70–110)
POCT GLUCOSE: 75 MG/DL (ref 70–110)
POTASSIUM SERPL-SCNC: 4.5 MMOL/L (ref 3.5–5.1)
PROT SERPL-MCNC: 5.7 G/DL (ref 6–8.4)
RBC # BLD AUTO: 3.47 M/UL (ref 4.6–6.2)
RBC # BLD AUTO: 3.57 M/UL (ref 4.6–6.2)
SODIUM SERPL-SCNC: 133 MMOL/L (ref 136–145)
TACROLIMUS BLD-MCNC: 12.5 NG/ML (ref 5–15)
WBC # BLD AUTO: 4.76 K/UL (ref 3.9–12.7)
WBC # BLD AUTO: 5.15 K/UL (ref 3.9–12.7)

## 2024-04-03 PROCEDURE — 99233 SBSQ HOSP IP/OBS HIGH 50: CPT | Mod: ,,, | Performed by: CLINICAL NURSE SPECIALIST

## 2024-04-03 PROCEDURE — 25000003 PHARM REV CODE 250: Performed by: NURSE PRACTITIONER

## 2024-04-03 PROCEDURE — 36415 COLL VENOUS BLD VENIPUNCTURE: CPT

## 2024-04-03 PROCEDURE — 85025 COMPLETE CBC W/AUTO DIFF WBC: CPT | Performed by: NURSE PRACTITIONER

## 2024-04-03 PROCEDURE — 63600175 PHARM REV CODE 636 W HCPCS: Performed by: NURSE PRACTITIONER

## 2024-04-03 PROCEDURE — 80053 COMPREHEN METABOLIC PANEL: CPT | Performed by: NURSE PRACTITIONER

## 2024-04-03 PROCEDURE — 83735 ASSAY OF MAGNESIUM: CPT | Performed by: NURSE PRACTITIONER

## 2024-04-03 PROCEDURE — 36415 COLL VENOUS BLD VENIPUNCTURE: CPT | Mod: XB | Performed by: NURSE PRACTITIONER

## 2024-04-03 PROCEDURE — 25000003 PHARM REV CODE 250: Performed by: STUDENT IN AN ORGANIZED HEALTH CARE EDUCATION/TRAINING PROGRAM

## 2024-04-03 PROCEDURE — 93005 ELECTROCARDIOGRAM TRACING: CPT

## 2024-04-03 PROCEDURE — 25000003 PHARM REV CODE 250

## 2024-04-03 PROCEDURE — 85025 COMPLETE CBC W/AUTO DIFF WBC: CPT | Mod: 91

## 2024-04-03 PROCEDURE — 93010 ELECTROCARDIOGRAM REPORT: CPT | Mod: ,,, | Performed by: INTERNAL MEDICINE

## 2024-04-03 PROCEDURE — 80197 ASSAY OF TACROLIMUS: CPT | Performed by: NURSE PRACTITIONER

## 2024-04-03 RX ORDER — AMOXICILLIN 500 MG/1
1000 CAPSULE ORAL EVERY 12 HOURS
Qty: 48 CAPSULE | Refills: 0 | Status: SHIPPED | OUTPATIENT
Start: 2024-04-03 | End: 2024-04-15

## 2024-04-03 RX ORDER — VALSARTAN 160 MG/1
160 TABLET ORAL DAILY
Qty: 90 TABLET | Refills: 3 | Status: ON HOLD | OUTPATIENT
Start: 2024-04-04 | End: 2024-05-03 | Stop reason: HOSPADM

## 2024-04-03 RX ORDER — LEVOFLOXACIN 500 MG/1
500 TABLET, FILM COATED ORAL DAILY
Qty: 12 TABLET | Refills: 0 | Status: SHIPPED | OUTPATIENT
Start: 2024-04-04 | End: 2024-04-16

## 2024-04-03 RX ORDER — VALSARTAN 160 MG/1
160 TABLET ORAL DAILY
Status: DISCONTINUED | OUTPATIENT
Start: 2024-04-03 | End: 2024-04-03 | Stop reason: HOSPADM

## 2024-04-03 RX ORDER — PANTOPRAZOLE SODIUM 40 MG/1
40 TABLET, DELAYED RELEASE ORAL 2 TIMES DAILY
Qty: 28 TABLET | Refills: 0 | Status: SHIPPED | OUTPATIENT
Start: 2024-04-03 | End: 2024-05-21

## 2024-04-03 RX ORDER — MYCOPHENOLATE MOFETIL 250 MG/1
1000 CAPSULE ORAL 2 TIMES DAILY
Qty: 240 CAPSULE | Refills: 11 | Status: ON HOLD | OUTPATIENT
Start: 2024-04-03 | End: 2024-06-16 | Stop reason: HOSPADM

## 2024-04-03 RX ADMIN — PANTOPRAZOLE SODIUM 40 MG: 40 TABLET, DELAYED RELEASE ORAL at 08:04

## 2024-04-03 RX ADMIN — GABAPENTIN 300 MG: 300 CAPSULE ORAL at 08:04

## 2024-04-03 RX ADMIN — AMOXICILLIN 1000 MG: 500 CAPSULE ORAL at 08:04

## 2024-04-03 RX ADMIN — VALSARTAN 160 MG: 40 TABLET, FILM COATED ORAL at 08:04

## 2024-04-03 RX ADMIN — ASPIRIN 81 MG: 81 TABLET, COATED ORAL at 08:04

## 2024-04-03 RX ADMIN — PREDNISONE 5 MG: 5 TABLET ORAL at 08:04

## 2024-04-03 RX ADMIN — CARVEDILOL 25 MG: 25 TABLET, FILM COATED ORAL at 08:04

## 2024-04-03 RX ADMIN — LEVOFLOXACIN 500 MG: 500 TABLET, FILM COATED ORAL at 08:04

## 2024-04-03 RX ADMIN — ATORVASTATIN CALCIUM 40 MG: 40 TABLET, FILM COATED ORAL at 08:04

## 2024-04-03 RX ADMIN — APIXABAN 5 MG: 5 TABLET, FILM COATED ORAL at 08:04

## 2024-04-03 NOTE — ASSESSMENT & PLAN NOTE
He is post transplant, on prophylactic immunosuppression to prevent rejection. He remains in immunosuppressed state, at risk of infections and, rejection, and toxicity. Thus, ongoing monitoring is needed to assess for toxicities and other adverse effects.

## 2024-04-03 NOTE — PLAN OF CARE
Problem: Adult Inpatient Plan of Care  Goal: Plan of Care Review  4/3/2024 1243 by Nighat Houser RN  Outcome: Met  4/3/2024 1058 by Nighat Houser RN  Outcome: Ongoing, Progressing  Goal: Patient-Specific Goal (Individualized)  Outcome: Met  Goal: Absence of Hospital-Acquired Illness or Injury  Outcome: Met  Goal: Optimal Comfort and Wellbeing  4/3/2024 1243 by Nighat Houser RN  Outcome: Met  4/3/2024 1058 by Nighat Houser RN  Outcome: Ongoing, Progressing  Goal: Readiness for Transition of Care  Outcome: Met     Problem: Diabetes Comorbidity  Goal: Blood Glucose Level Within Targeted Range  4/3/2024 1243 by Nighat Houser RN  Outcome: Met  4/3/2024 1058 by Nighat Houser RN  Outcome: Ongoing, Progressing     Problem: Fall Injury Risk  Goal: Absence of Fall and Fall-Related Injury  4/3/2024 1243 by Nighat Houser RN  Outcome: Met  4/3/2024 1058 by Nighat Houser RN  Outcome: Ongoing, Progressing     Problem: Hypertension Acute  Goal: Blood Pressure Within Desired Range  Outcome: Met     Problem: Infection  Goal: Absence of Infection Signs and Symptoms  4/3/2024 1243 by Nighat Houser RN  Outcome: Met  4/3/2024 1058 by Nighat Houser RN  Outcome: Ongoing, Progressing     Problem: Anemia  Goal: Anemia Symptom Improvement  4/3/2024 1243 by Nighat Houser RN  Outcome: Met  4/3/2024 1058 by Nighat Houser RN  Outcome: Ongoing, Progressing

## 2024-04-03 NOTE — ASSESSMENT & PLAN NOTE
-Initial rx plan with clarithromycin carries risk of major drug interaction with tacrolimus; changed to levofloxacin

## 2024-04-03 NOTE — SUBJECTIVE & OBJECTIVE
Subjective:     History of Present Illness:   ESRD S/p kidney transplant 2016, anemia, HLD, HTN, a fib on eliquis, DM2, neuropathy, CHF (EF 65%) admitted for symptomatic anemia for which given 2 u PRBC on admission 4/1/24. Recently noted h. Pylori + during w/u for diarrhea, but has not been treated for this.  Baseline creat approx 1.5-1.8, CKD IIIa  Hemoglobin last 14th 2023 is 9.1 is found to be 6 2 on admission April 1, 2024.     amoxicillin  1,000 mg Oral Q12H    apixaban  5 mg Oral BID    aspirin  81 mg Oral Daily    atorvastatin  40 mg Oral Daily    carvediloL  25 mg Oral BID    gabapentin  300 mg Oral TID    And    gabapentin  600 mg Oral QHS    insulin detemir U-100  15 Units Subcutaneous Daily    [START ON 4/3/2024] levoFLOXacin  500 mg Oral Daily    pantoprazole  40 mg Oral BID    predniSONE  5 mg Oral Daily    tacrolimus  0.5 mg Oral BID    [START ON 4/3/2024] valsartan  80 mg Oral Daily     Past Medical and Surgical History: Mr. Zamorano has a past medical history of Anxiety (7/29/2014), Arthritis, Bilateral diabetic retinopathy (2017), CKD (chronic kidney disease) stage 2, GFR 60-89 ml/min (12/28/2016), CKD (chronic kidney disease) stage 4, GFR 15-29 ml/min (7/29/2014), Colon polyps (2014), Depression - situational (7/29/2014), Diabetes mellitus, Diabetes type 2 since 2000 (7/29/2014), Diabetic neuropathy (7/29/2014), History of cardioversion (10/3/2019), History of hepatitis C, s/p successful Rx w/ SVR24 - 9/2017 (7/29/2014), Hyperlipidemia (7/29/2014), Hypertension, Neuropathy, Organ transplant candidate (7/29/2014), Pancreatitis, S/P cadaveric kidney transplant 11/5/2016. ESRD secondary to HTN/DMII (11/5/2016), Stage 3a chronic kidney disease (12/28/2016), and Type 2 diabetes mellitus with stage 3a chronic kidney disease, with long-term current use of insulin (7/29/2014).  He has a past surgical history that includes Appendectomy; Kidney transplant; Treatment of cardiac arrhythmia (N/A,  "8/26/2019); Transesophageal echocardiography (N/A, 8/26/2019); Colonoscopy (N/A, 12/21/2020); and Cataract extraction w/  intraocular lens implant (Right, 3/13/2024).    Past Social and Family History: Mr. Zamorano reports that he quit smoking about 7 years ago. His smoking use included cigarettes. He started smoking about 39 years ago. He has a 16.0 pack-year smoking history. He has never used smokeless tobacco. He reports current alcohol use. He reports that he does not use drugs.His family history includes Cancer in his brother; Diabetes in his mother; Heart disease in his father and mother; Hypertension in his brother and mother.    Intake/Output - Last 3 Shifts         03/31 0700  04/01 0659 04/01 0700  04/02 0659 04/02 0700  04/03 0659    Blood  1005 395.8    Total Intake(mL/kg)  1005 (11.6) 395.8 (4.6)    Urine (mL/kg/hr)   300 (0.2)    Stool   0    Total Output   300    Net  +1005 +95.8           Stool Occurrence  1 x 1 x             Review of Systems   Constitutional:  Positive for fatigue.   Respiratory:  Negative for shortness of breath.    Cardiovascular:  Negative for chest pain and leg swelling.   Gastrointestinal:  Positive for diarrhea. Negative for abdominal pain, nausea and vomiting.   Genitourinary:  Negative for difficulty urinating.   Skin:  Negative for rash.   Allergic/Immunologic: Positive for immunocompromised state.     Objective:     Vital Signs (Most Recent):  Temp: 98 °F (36.7 °C) (04/02/24 1950)  Pulse: (!) 119 (04/02/24 1950)  Resp: 18 (04/02/24 1950)  BP: (!) 163/84 (04/02/24 1950)  SpO2: 98 % (04/02/24 1950) Vital Signs (24h Range):  Temp:  [97.8 °F (36.6 °C)-98.7 °F (37.1 °C)] 98 °F (36.7 °C)  Pulse:  [] 119  Resp:  [16-18] 18  SpO2:  [96 %-100 %] 98 %  BP: (132-194)/(59-92) 163/84     Weight: 86.7 kg (191 lb 1.2 oz)  Height: 6' 1" (185.4 cm)  Body mass index is 25.21 kg/m².     Physical Exam  Constitutional:       Appearance: Normal appearance.   Cardiovascular:      Rate and " Rhythm: Normal rate and regular rhythm.   Pulmonary:      Effort: Pulmonary effort is normal.      Breath sounds: Normal breath sounds.   Abdominal:      Palpations: Abdomen is soft. There is no mass.      Tenderness: There is no abdominal tenderness.   Musculoskeletal:      Right lower leg: No edema.      Left lower leg: No edema.          Significant Labs:  Recent Labs   Lab 04/01/24  0940 04/01/24  1708 04/02/24  0445    131* 134*   K 4.3 4.3 3.9    101 104   CO2 21* 22* 20*   BUN 31* 32* 27*   CREATININE 1.8* 1.7* 1.5*   CALCIUM 9.0 8.9 8.4*

## 2024-04-03 NOTE — ASSESSMENT & PLAN NOTE
-CKD IIIa post transplant, overall stable with baseline cr 1.5-1.8,  -Avoid nephrotoxic agents (NSAIDs, IV contrast dye, ACEI/ARB anti-HTN medications, Aminoglycoside-containing antibiotics)  -Renally dose all appropriate medications, including antibiotics

## 2024-04-03 NOTE — DISCHARGE SUMMARY
[facility] [address] [place] [address] Medicine  Discharge Summary      Patient Name: Ravi Zamorano  MRN: [id number]  MARISOL: [id number]  Patient Class: IP- Inpatient  Admission Date: 4/1/2024  Hospital Length of Stay: 1 days  Discharge Date and Time: 4/3/2024  2:22 PM  Attending Physician: Sho att. providers found   Discharging Provider: Aditi Walton PA-C  Primary Care Provider: Mima Mack MD  [facility] Medicine Team: [facility] MED E Aditi Walton PA-C  Primary Care Team: [facility] MED E    HPI:   Ravi Zamorano is a 68 y.o. male with a PMHx of anemia, HLD, HTN, a fib on eliquis, DM2, neuropathy, CHF (EF 65%), ESRD S/p kidney transplant 2016, and CKD3 who presents to the ED for abnormal outpatient labs. The patient reports ongoing lightheadedness/dizziness for at least 4 months noting it has worsened in the past several weeks. The patient also notes persistent non productive for about 6 weeks. Denies shortness of breath at rest. Endorses DOSS for the past month. The patient also had been experiencing diarrhea that began 6 weeks ago and completed outpatient stool studies and was prescribed lomotil. He reports the diarrhea has improved and states he stool is soft but not loose anymore. Denies melena or hematochezia. The patient reports his BP has been more elevated recently and was restarted on a lower dose of valsartan today, which he hasn't taken yet. He denies fever/chills, N/V, abdominal pain, CP, or dysuria.    In the ED, patient hypertensive otherwise vitals stable, afebrile. WBC 3.57. Hgb 5.7 (9.1 previously 11/2023). Plt 136 (chronic, stable). Na 131. Cr 1.7 (bl 1.7-1.8). Albumin 3.3. Glucose 208. Anemia panel in process. EKG shows a fib, 76 bpm, no ST elevation or depression. Type & screen completed. 2u PRBCs ordered. LEAH performed by ED provider revealed brown stool in rectal vault.    * No surgery found *      Hospital Course:   Ravi Zamorano is a 68 y.o.M who was placed in observation for  symptomatic anemia. Findings of pancytopenia with Hgb 5.7 on admission. Given 2u pRBCs in ED with improvement to 7. Transfused another 1u pRBCs (3u total). Monitor CBC - improved. Patient asymptomatic. Patient recently with diarrhea symptoms with positive H. Pylori sample - patient unaware of lab findings. No longer having diarrheal symptoms, but started on levofloxacin, amoxicillin, and PPI for tx. GI consulted for evaluation of anemia - low suspicion for GI bleed, no endoscopic eval at this time. KTM consulted for history of kidney transplant. Held prograf for increased tacro level.  Patient medically ready for discharge. Plan to follow up with PCP, heme, EP for afib. Follow up labs in 1 week. Return precautions provided. Patient was seen and assessed on day of discharge. Plan of care discussed with patient, patient agreeable with plan, and all questions answered.    Physical Exam  Gen: in NAD, appears stated age  Neuro: mental status at baseline, motor, sensory, and strength grossly intact BL  HEENT: EOMI, PERRLA; no JVD appreciated  CVS: RRR, no m/r/g  Resp: lungs CTAB, no w/r/r; no belabored breathing or accessory muscle use appreciated   Abd: NTND, soft to palpation  Extrem: no UE or LE edema BL       Goals of Care Treatment Preferences:  Code Status: Full Code      Consults:   Consults (From admission, onward)          Status Ordering Provider     Inpatient consult to Kidney/Pancreas Transplant Medicine  Once        Provider:  (Not yet assigned)    Completed ANNE-MARIE OLIVERA     Inpatient consult to Gastroenterology  Once        Provider:  (Not yet assigned)    Completed KIARA LUNA            No new Assessment & Plan notes have been filed under this hospital service since the last note was generated.  Service: Hospital Medicine    Final Active Diagnoses:    Diagnosis Date Noted POA    PRINCIPAL PROBLEM:  Symptomatic anemia [D64.9] 04/02/2024 Yes    Kidney replaced by transplant [Z94.0] 04/03/2024 Not  Applicable    Thrombocytopenia, unspecified [D69.6] 04/01/2024 Yes    H. pylori infection [A04.8] 04/01/2024 Yes    Paroxysmal A-fib [I48.0] 12/19/2023 Yes    PAD (peripheral artery disease) [I73.9] 02/09/2023 Yes    Anticoagulant long-term use [Z79.01] 10/03/2019 Not Applicable    Stage 3 chronic kidney disease [N18.30] 12/28/2016 Yes    Pancytopenia [D61.818] 11/07/2016 Yes    Immunocompromised state due to drug therapy [D84.821, Z79.899] 11/07/2016 Not Applicable    S/P cadaveric kidney transplant 11/5/2016. ESRD secondary to HTN/DMII [Z94.0] 11/05/2016 Not Applicable    Chronic diastolic congestive heart failure [I50.32] 10/24/2014 Yes    Prophylactic immunotherapy [Z29.89] 09/16/2014 Not Applicable    Diabetic polyneuropathy associated with type 2 diabetes mellitus [E11.42] 07/29/2014 Yes    Type 2 diabetes mellitus with stage 3a chronic kidney disease, with long-term current use of insulin [E11.22, N18.31, Z79.4] 07/29/2014 Not Applicable    Hyperlipidemia [E78.5] 07/29/2014 Yes    Hypertension since 2000 [I10] 07/29/2014 Yes      Problems Resolved During this Admission:       Discharged Condition: good    Disposition: Home or Self Care    Follow Up:   Follow-up Information       Mima Mack MD. Schedule an appointment as soon as possible for a visit in 1 week(s).    Specialty: Internal Medicine  Contact information:  [address]  [place] [address]  [phone]                           Patient Instructions:      CBC W/ AUTO DIFFERENTIAL   Standing Status: Future Standing Exp. Date: 07/02/25     Ambulatory referral/consult to Hematology / Oncology   Standing Status: Future   Referral Priority: Routine Referral Type: Consultation   Referral Reason: Specialty Services Required   Requested Specialty: Hematology and Oncology   Number of Visits Requested: 1     Ambulatory referral/consult to Cardiac Electrophysiology   Standing Status: Future   Referral Priority: Routine Referral Type:  Consultation   Referral Reason: Specialty Services Required   Requested Specialty: Cardiology   Number of Visits Requested: 1     Ambulatory referral/consult to Gastroenterology   Standing Status: Future   Referral Priority: Urgent Referral Type: Consultation   Referral Reason: Specialty Services Required   Requested Specialty: Gastroenterology   Number of Visits Requested: 1     Notify your health care provider if you experience any of the following:  temperature >100.4     Notify your health care provider if you experience any of the following:  severe persistent headache     Notify your health care provider if you experience any of the following:  persistent dizziness, light-headedness, or visual disturbances     Notify your health care provider if you experience any of the following:  increased confusion or weakness     Notify your health care provider if you experience any of the following:  temperature >100.4     Notify your health care provider if you experience any of the following:  severe persistent headache     Notify your health care provider if you experience any of the following:  persistent dizziness, light-headedness, or visual disturbances     Notify your health care provider if you experience any of the following:  increased confusion or weakness     Activity as tolerated     Activity as tolerated       Significant Diagnostic Studies: N/A    Pending Diagnostic Studies:       Procedure Component Value Units Date/Time    EKG 12-lead [[id number]]     Order Status: Sent Lab Status: No result            Medications:  Reconciled Home Medications:      Medication List        START taking these medications      amoxicillin 500 MG capsule  Commonly known as: AMOXIL  Take 2 capsules (1,000 mg total) by mouth every 12 (twelve) hours. for 12 days     levoFLOXacin 500 MG tablet  Commonly known as: LEVAQUIN  Take 1 tablet (500 mg total) by mouth once daily. for 12 days  Start taking on: April 4, 2024    "  pantoprazole 40 MG tablet  Commonly known as: PROTONIX  Take 1 tablet (40 mg total) by mouth 2 (two) times a day. for 14 days            CHANGE how you take these medications      mycophenolate 250 mg Cap  Commonly known as: CELLCEPT  Take 4 capsules (1,000 mg total) by mouth 2 (two) times daily. HOLD cellcept until Monday (4/8)  What changed: additional instructions     valsartan 160 MG tablet  Commonly known as: DIOVAN  Take 1 tablet (160 mg total) by mouth once daily.  Start taking on: April 4, 2024  What changed:   medication strength  how much to take            CONTINUE taking these medications      apixaban 5 mg Tab  Commonly known as: ELIQUIS  Take 1 tablet (5 mg total) by mouth 2 (two) times daily.     aspirin 81 MG Chew  Take 81 mg by mouth once daily.     atorvastatin 40 MG tablet  Commonly known as: LIPITOR  Take 1 tablet (40 mg total) by mouth once daily.     * BD ULTRA-FINE LO PEN NEEDLE 32 gauge x 5/32" Ndle  Generic drug: pen needle, diabetic  USE TO ADMINISTER INSULIN 4 TIMES DAILY.     * BD ULTRA-FINE LO PEN NEEDLE 32 gauge x 5/32" Ndle  Generic drug: pen needle, diabetic  use four times a day     blood-glucose sensor Kayla  Gin 3, use as directed, change every 14 days , e 11.65     carvediloL 25 MG tablet  Commonly known as: COREG  Take 1 tablet (25 mg total) by mouth 2 (two) times daily.     famotidine 20 MG tablet  Commonly known as: PEPCID  Take 1 tablet (20 mg total) by mouth every evening.     gabapentin 300 MG capsule  Commonly known as: NEURONTIN  Take 1 capsule by mouth in the morning, 1 capsule midday, and 3 capsules at night.     insulin aspart U-100 100 unit/mL (3 mL) Inpn pen  Commonly known as: NovoLOG  Inject 16 units w/ breakfast, 10 units at lunch and dinner scale 180-230+2, 231-280+4, 281-330+6, 331-380+8, >380+10.  *Max daily 66 units.*     JARDIANCE 10 mg tablet  Generic drug: empagliflozin  Take 1 tablet (10 mg total) by mouth once daily.     LANTUS SOLOSTAR U-100 " INSULIN glargine 100 units/mL SubQ pen  Generic drug: insulin  Inject 20 Units into the skin every morning.     meclizine 25 mg tablet  Commonly known as: ANTIVERT  Take 1 tablet (25 mg total) by mouth 3 (three) times daily as needed for Dizziness.     predniSONE 5 MG tablet  Commonly known as: DELTASONE  Take 1 tablet (5 mg total) by mouth once daily.     tacrolimus 0.5 MG Cap  Commonly known as: PROGRAF  Take 1 capsule (0.5 mg total) by mouth every 12 (twelve) hours.           * This list has 2 medication(s) that are the same as other medications prescribed for you. Read the directions carefully, and ask your doctor or other care provider to review them with you.                STOP taking these medications      hydroCHLOROthiazide 25 MG tablet  Commonly known as: HYDRODIURIL              Indwelling Lines/Drains at time of discharge:   Lines/Drains/Airways       Drain  Duration                  Closed/Suction Drain 11/05/16 2021 Right Abdomen Bulb 15 Fr. 2705 days                    Time spent on the discharge of patient: 36 minutes         Aditi Walton PA-C  Department of Hospital Medicine  [facility]

## 2024-04-03 NOTE — TELEPHONE ENCOUNTER
----- Message from Glenys Ramirez sent at 4/3/2024 11:09 AM CDT -----  Contact: [phone]  Maria De Jesus with [facility] is calling for a HFU discharging today please give return call

## 2024-04-03 NOTE — SUBJECTIVE & OBJECTIVE
Subjective:   History of Present Illness:  ESRD S/p kidney transplant 2016, anemia, HLD, HTN, a fib on eliquis, DM2, neuropathy, CHF (EF 65%) admitted for symptomatic anemia for which given 2 u PRBC on admission 4/1/24. Recently noted h. Pylori + during w/u for diarrhea, but has not been treated for this.  Baseline creat approx 1.5-1.8, CKD IIIa  Hemoglobin last 14th 2023 is 9.1 is found to be 6 2 on admission April 1, 2024.      Hospital Course:  Patient admitted for symptomatic anemia, transfused a total of 3 units PRBC. H/H now stabilized. No overt signs of bleeding noted. Also recently diagnosed w/ H.pylori. Started on Clarithromycin, Amoxicillin, and Protonix for H. Pylori infection. Switched from Clarithromycin to levofloxacin d/t interaction with tacrolimus.     Interval note:   - NAEON. H/H responded well to blood transfusions  - Creatinine stable at 1.7, at baseline.  - Tacrolimus level supratherapeutic at 12.5. Hold tac today  - Discharging per primary.     D/c recs:  - Hold prograf today d/t elevated level. Restart prograf at 0.5 mg BID tomorrow  - Continue to hold mycophenolate. OK to restart Monday 4/8  - Unlikely that immunosuppression meds are cause of patient's anemia as patient has been taking them for several years now.   Recommend outpatient hematology referral for anemia work-up  - Labs on Monday, to be scheduled by patient's transplant coordinator        Past Medical, Surgical, Family, and Social History:   Unchanged from H&P.    Scheduled Meds:   amoxicillin  1,000 mg Oral Q12H    apixaban  5 mg Oral BID    aspirin  81 mg Oral Daily    atorvastatin  40 mg Oral Daily    carvediloL  25 mg Oral BID    gabapentin  300 mg Oral TID    And    gabapentin  600 mg Oral QHS    insulin detemir U-100  15 Units Subcutaneous Daily    levoFLOXacin  500 mg Oral Daily    pantoprazole  40 mg Oral BID    predniSONE  5 mg Oral Daily    valsartan  160 mg Oral Daily     Continuous Infusions:  PRN Meds:0.9%  NaCl  "infusion (for blood administration), 0.9%  NaCl infusion (for blood administration), acetaminophen, benzonatate, dextrose 10%, dextrose 10%, glucagon (human recombinant), glucose, glucose, hydrALAZINE, insulin aspart U-100, melatonin, sodium chloride 0.9%    Intake/Output - Last 3 Shifts         04/01 0700  04/02 0659 04/02 0700  04/03 0659 04/03 0700  04/04 0659    Blood 1005 395.8     Total Intake(mL/kg) 1005 (11.6) 395.8 (4.6)     Urine (mL/kg/hr)  1400 (0.7) 250 (0.6)    Stool  0     Total Output  1400 250    Net +1005 -1004.2 -250           Stool Occurrence 1 x 1 x              Review of Systems   Respiratory:  Negative for shortness of breath.    Cardiovascular:  Negative for chest pain and leg swelling.   Gastrointestinal:  Positive for diarrhea. Negative for abdominal pain, nausea and vomiting.   Genitourinary:  Negative for difficulty urinating.   Skin:  Negative for rash.   Allergic/Immunologic: Positive for immunocompromised state.      Objective:     Vital Signs (Most Recent):  Temp: 97.9 °F (36.6 °C) (04/03/24 0529)  Pulse: 75 (04/03/24 1034)  Resp: 18 (04/03/24 0529)  BP: (!) 136/90 (04/03/24 1034)  SpO2: 97 % (04/03/24 1034) Vital Signs (24h Range):  Temp:  [97.9 °F (36.6 °C)-98.2 °F (36.8 °C)] 97.9 °F (36.6 °C)  Pulse:  [] 75  Resp:  [16-18] 18  SpO2:  [96 %-100 %] 97 %  BP: (136-171)/(76-91) 136/90     Weight: 86.7 kg (191 lb 1.2 oz)  Height: 6' 1" (185.4 cm)  Body mass index is 25.21 kg/m².     Physical Exam  Constitutional:       Appearance: Normal appearance.   Cardiovascular:      Rate and Rhythm: Normal rate and regular rhythm.   Pulmonary:      Effort: Pulmonary effort is normal.      Breath sounds: Normal breath sounds.   Abdominal:      Palpations: Abdomen is soft. There is no mass.      Tenderness: There is no abdominal tenderness.   Musculoskeletal:      Right lower leg: No edema.      Left lower leg: No edema.          Laboratory:  CBC:   Recent Labs   Lab 04/02/24  0445 " 04/02/24  1553 04/03/24  0241   WBC 3.48* 4.21 4.76   RBC 2.96* 3.42* 3.47*   HGB 7.0* 8.4* 8.6*   HCT 23.8* 27.9* 28.4*   * 128* 129*   MCV 80* 82 82   MCH 23.6* 24.6* 24.8*   MCHC 29.4* 30.1* 30.3*     CMP:   Recent Labs   Lab 04/01/24  1708 04/02/24  0445 04/03/24  0241   * 95 99   CALCIUM 8.9 8.4* 8.5*   ALBUMIN 3.3* 3.1* 3.1*   PROT 6.5 5.5* 5.7*   * 134* 133*   K 4.3 3.9 4.5   CO2 22* 20* 23    104 103   BUN 32* 27* 29*   CREATININE 1.7* 1.5* 1.7*   ALKPHOS 47* 48* 48*   ALT <5* <5* <5*   AST 11 9* 10       Diagnostic Results:  None

## 2024-04-03 NOTE — HOSPITAL COURSE
Patient admitted for symptomatic anemia, transfused a total of 3 units PRBC. H/H now stabilized. No overt signs of bleeding noted. Also recently diagnosed w/ H.pylori. Started on Clarithromycin, Amoxicillin, and Protonix for H. Pylori infection. Switched from Clarithromycin to levofloxacin d/t interaction with tacrolimus.     Interval note:   - NAEON. H/H responded well to blood transfusions  - Creatinine stable at 1.7, at baseline.  - Tacrolimus level supratherapeutic at 12.5. Hold tac today  - Discharging per primary.     D/c recs:  - Hold prograf today d/t elevated level. Restart prograf at 0.5 mg BID tomorrow  - Continue to hold mycophenolate. OK to restart Monday 4/8  - Unlikely that immunosuppression meds are cause of patient's anemia as patient has been taking them for several years now.   Recommend outpatient hematology referral for anemia work-up  - Labs on Monday, to be scheduled by patient's transplant coordinator

## 2024-04-03 NOTE — PLAN OF CARE
Problem: Adult Inpatient Plan of Care  Goal: Plan of Care Review  Outcome: Ongoing, Progressing  Goal: Optimal Comfort and Wellbeing  Outcome: Ongoing, Progressing     Problem: Diabetes Comorbidity  Goal: Blood Glucose Level Within Targeted Range  Outcome: Ongoing, Progressing     Problem: Fall Injury Risk  Goal: Absence of Fall and Fall-Related Injury  Outcome: Ongoing, Progressing     Problem: Infection  Goal: Absence of Infection Signs and Symptoms  Outcome: Ongoing, Progressing     Problem: Anemia  Goal: Anemia Symptom Improvement  Outcome: Ongoing, Progressing      WDL

## 2024-04-03 NOTE — CONSULTS
[facility] - [facility]ation [facility]  Kidney Transplant  Consult Note    Inpatient consult to Kidney/Pancreas Transplant Medicine  Consult performed by: Risa Blackman MD  Consult ordered by: Madonna Fuller MD            Subjective:     History of Present Illness:   ESRD S/p kidney transplant 2016, anemia, HLD, HTN, a fib on eliquis, DM2, neuropathy, CHF (EF 65%) admitted for symptomatic anemia for which given 2 u PRBC on admission 4/1/24. Recently noted h. Pylori + during w/u for diarrhea, but has not been treated for this.  Baseline creat approx 1.5-1.8, CKD IIIa  Hemoglobin last 14th 2023 is 9.1 is found to be 6 2 on admission April 1, 2024.     amoxicillin  1,000 mg Oral Q12H    apixaban  5 mg Oral BID    aspirin  81 mg Oral Daily    atorvastatin  40 mg Oral Daily    carvediloL  25 mg Oral BID    gabapentin  300 mg Oral TID    And    gabapentin  600 mg Oral QHS    insulin detemir U-100  15 Units Subcutaneous Daily    [START ON 4/3/2024] levoFLOXacin  500 mg Oral Daily    pantoprazole  40 mg Oral BID    predniSONE  5 mg Oral Daily    tacrolimus  0.5 mg Oral BID    [START ON 4/3/2024] valsartan  80 mg Oral Daily     Past Medical and Surgical History: Mr. Zamorano has a past medical history of Anxiety (7/29/2014), Arthritis, Bilateral diabetic retinopathy (2017), CKD (chronic kidney disease) stage 2, GFR 60-89 ml/min (12/28/2016), CKD (chronic kidney disease) stage 4, GFR 15-29 ml/min (7/29/2014), Colon polyps (2014), Depression - situational (7/29/2014), Diabetes mellitus, Diabetes type 2 since 2000 (7/29/2014), Diabetic neuropathy (7/29/2014), History of cardioversion (10/3/2019), History of hepatitis C, s/p successful Rx w/ SVR24 - 9/2017 (7/29/2014), Hyperlipidemia (7/29/2014), Hypertension, Neuropathy, Organ transplant candidate (7/29/2014), Pancreatitis, S/P cadaveric kidney transplant 11/5/2016. ESRD secondary to HTN/DMII (11/5/2016), Stage 3a chronic kidney disease (12/28/2016), and Type 2 diabetes  mellitus with stage 3a chronic kidney disease, with long-term current use of insulin (7/29/2014).  He has a past surgical history that includes Appendectomy; Kidney transplant; Treatment of cardiac arrhythmia (N/A, 8/26/2019); Transesophageal echocardiography (N/A, 8/26/2019); Colonoscopy (N/A, 12/21/2020); and Cataract extraction w/  intraocular lens implant (Right, 3/13/2024).    Past Social and Family History: Mr. Zamorano reports that he quit smoking about 7 years ago. His smoking use included cigarettes. He started smoking about 39 years ago. He has a 16.0 pack-year smoking history. He has never used smokeless tobacco. He reports current alcohol use. He reports that he does not use drugs.His family history includes Cancer in his brother; Diabetes in his mother; Heart disease in his father and mother; Hypertension in his brother and mother.    Intake/Output - Last 3 Shifts         03/31 0700  04/01 0659 04/01 0700 04/02 0659 04/02 0700  04/03 0659    Blood  1005 395.8    Total Intake(mL/kg)  1005 (11.6) 395.8 (4.6)    Urine (mL/kg/hr)   300 (0.2)    Stool   0    Total Output   300    Net  +1005 +95.8           Stool Occurrence  1 x 1 x             Review of Systems   Constitutional:  Positive for fatigue.   Respiratory:  Negative for shortness of breath.    Cardiovascular:  Negative for chest pain and leg swelling.   Gastrointestinal:  Positive for diarrhea. Negative for abdominal pain, nausea and vomiting.   Genitourinary:  Negative for difficulty urinating.   Skin:  Negative for rash.   Allergic/Immunologic: Positive for immunocompromised state.     Objective:     Vital Signs (Most Recent):  Temp: 98 °F (36.7 °C) (04/02/24 1950)  Pulse: (!) 119 (04/02/24 1950)  Resp: 18 (04/02/24 1950)  BP: (!) 163/84 (04/02/24 1950)  SpO2: 98 % (04/02/24 1950) Vital Signs (24h Range):  Temp:  [97.8 °F (36.6 °C)-98.7 °F (37.1 °C)] 98 °F (36.7 °C)  Pulse:  [] 119  Resp:  [16-18] 18  SpO2:  [96 %-100 %] 98 %  BP:  "(132-194)/(59-92) 163/84     Weight: 86.7 kg (191 lb 1.2 oz)  Height: 6' 1" (185.4 cm)  Body mass index is 25.21 kg/m².     Physical Exam  Constitutional:       Appearance: Normal appearance.   Cardiovascular:      Rate and Rhythm: Normal rate and regular rhythm.   Pulmonary:      Effort: Pulmonary effort is normal.      Breath sounds: Normal breath sounds.   Abdominal:      Palpations: Abdomen is soft. There is no mass.      Tenderness: There is no abdominal tenderness.   Musculoskeletal:      Right lower leg: No edema.      Left lower leg: No edema.          Significant Labs:  Recent Labs   Lab 04/01/24  0940 04/01/24  1708 04/02/24  0445    131* 134*   K 4.3 4.3 3.9    101 104   CO2 21* 22* 20*   BUN 31* 32* 27*   CREATININE 1.8* 1.7* 1.5*   CALCIUM 9.0 8.9 8.4*     Assessment/Plan:     Cardiac/Vascular  Hypertension since 2000  -Cont carvedilol and monitor      Renal/  S/P cadaveric kidney transplant 11/5/2016. ESRD secondary to HTN/DMII  -Stable CKD, see CKD IIIb      Stage 3 chronic kidney disease  -CKD IIIa post transplant, overall stable with baseline cr 1.5-1.8,  -Avoid nephrotoxic agents (NSAIDs, IV contrast dye, ACEI/ARB anti-HTN medications, Aminoglycoside-containing antibiotics)  -Renally dose all appropriate medications, including antibiotics       Immunology/Multi System  Immunocompromised state due to drug therapy  He is post transplant, on prophylactic immunosuppression to prevent rejection. He remains in immunosuppressed state, at risk of infections and, rejection, and toxicity. Thus, ongoing monitoring is needed to assess for toxicities and other adverse effects.       Prophylactic immunotherapy  -Continue home tacrolimus of 0.5 mg bid  -Target level 4-6  -Monitor for toxicities      GI  H. pylori infection  -Initial rx plan with clarithromycin carries risk of major drug interaction with tacrolimus; changed to levofloxacin        Anemia/pancytopenia w/u per hospital " medicine    Risa Blackman MD  Kidney Transplant  [facility] - Observation 11H

## 2024-04-03 NOTE — ASSESSMENT & PLAN NOTE
- Prograf level 12.5 on 4/3. Plan to hold prograf today. Restart prograf tomorrow at 0.5 mg BID.   - Target level 4-6  - Continue to hold mycophenolate at discharge. Restart Monday 4/8  - Outpatient labs Monday 4/8

## 2024-04-03 NOTE — PLAN OF CARE
Please see if patient can come to [facility] office today to be seen or add on tomorrow with Dr. Allan.     Israel Rand DO   Pulmonary Critical Care & Sleep Medicine      [facility] - Observation 11H  Discharge Final Note    Primary Care Provider: Mima Mack MD    Expected Discharge Date: 4/3/2024    Future Appointments   Date Time Provider Department Center   4/9/2024  9:30 AM Mima Mack MD [facility]   4/11/2024  3:00 PM Jennifer Palacio MD [facility]   4/18/2024  9:30 AM Darwin Gottlieb MD PhD [facility]   5/21/2024  8:30 AM Mima Mack MD [facility]   7/3/2024  2:00 PM Syl Dave, DPM [facility]   7/10/2024  9:30 AM Estuardo Obrien MD [facility]   7/23/2024  1:00 PM Karan Lopez APRN, SALOMEP [facility]     Pt discharged home with no services. CM attempted to schedule  Heme/Onc referral. No appointments available in the requested time,   will send message to clinic nurse to contact pt/family with appointmtent.   Tico Cisneros, RN,BSN        Final Discharge Note (most recent)       Final Note - 04/03/24 1348          Final Note    Assessment Type Final Discharge Note     Anticipated Discharge Disposition Home or Self Care     Hospital Resources/Appts/Education Provided Provided patient/caregiver with written discharge plan information;Appointments scheduled and added to AVS;Post-Acute resouces added to AVS        Post-Acute Status    Discharge Delays None known at this time                     Important Message from Medicare             Contact Info       Mima Mack MD   Specialty: Internal Medicine   Relationship: PCP - General    [address]  [facility] [address]   Phone: [phone]       Next Steps: Schedule an appointment as soon as possible for a visit in 1 week(s)

## 2024-04-03 NOTE — ASSESSMENT & PLAN NOTE
- Transfused a total of 3 units PRBC. H/H now stabilized  - GI consulted, no indication for scope at this time  - Unlikely that immunosuppression agents are cause of patient's anemia as patient has been on these meds for several years  - Recommend outpatient hematology referral for further work-up

## 2024-04-03 NOTE — PLAN OF CARE
Health Maintenance Due   Topic Date Due   • COVID-19 Vaccine (1) Never done       Patient is due for topics as listed above but is not proceeding with Immunization(s) COVID-19 at this time. Education provided for Immunization(s) COVID-19.   KRYS scheduled the [facility] hospital follow up for April 9 @ 9:30am

## 2024-04-03 NOTE — PROGRESS NOTES
[facility] - Observation 11H  Kidney Transplant  Progress Note      Reason for Follow-up: Reassessment of Kidney Transplant - 11/5/2016  (#1) recipient and management of immunosuppression.     ORGAN:  RIGHT KIDNEY   Donor Type:  Donation after Brain Death         Subjective:   History of Present Illness:  ESRD S/p kidney transplant 2016, anemia, HLD, HTN, a fib on eliquis, DM2, neuropathy, CHF (EF 65%) admitted for symptomatic anemia for which given 2 u PRBC on admission 4/1/24. Recently noted h. Pylori + during w/u for diarrhea, but has not been treated for this.  Baseline creat approx 1.5-1.8, CKD IIIa  Hemoglobin last 14th 2023 is 9.1 is found to be 6 2 on admission April 1, 2024.      Hospital Course:  Patient admitted for symptomatic anemia, transfused a total of 3 units PRBC. H/H now stabilized. No overt signs of bleeding noted. Also recently diagnosed w/ H.pylori. Started on Clarithromycin, Amoxicillin, and Protonix for H. Pylori infection. Switched from Clarithromycin to levofloxacin d/t interaction with tacrolimus.     Interval note:   - NAEON. H/H responded well to blood transfusions  - Creatinine stable at 1.7, at baseline.  - Tacrolimus level supratherapeutic at 12.5. Hold tac today  - Discharging per primary.     D/c recs:  - Hold prograf today d/t elevated level. Restart prograf at 0.5 mg BID tomorrow  - Continue to hold mycophenolate. OK to restart Monday 4/8  - Unlikely that immunosuppression meds are cause of patient's anemia as patient has been taking them for several years now.   Recommend outpatient hematology referral for anemia work-up  - Labs on Monday, to be scheduled by patient's transplant coordinator        Past Medical, Surgical, Family, and Social History:   Unchanged from H&P.    Scheduled Meds:   amoxicillin  1,000 mg Oral Q12H    apixaban  5 mg Oral BID    aspirin  81 mg Oral Daily    atorvastatin  40 mg Oral Daily    carvediloL  25 mg Oral BID    gabapentin  300 mg Oral TID    And     "gabapentin  600 mg Oral QHS    insulin detemir U-100  15 Units Subcutaneous Daily    levoFLOXacin  500 mg Oral Daily    pantoprazole  40 mg Oral BID    predniSONE  5 mg Oral Daily    valsartan  160 mg Oral Daily     Continuous Infusions:  PRN Meds:0.9%  NaCl infusion (for blood administration), 0.9%  NaCl infusion (for blood administration), acetaminophen, benzonatate, dextrose 10%, dextrose 10%, glucagon (human recombinant), glucose, glucose, hydrALAZINE, insulin aspart U-100, melatonin, sodium chloride 0.9%    Intake/Output - Last 3 Shifts         04/01 0700  04/02 0659 04/02 0700  04/03 0659 04/03 0700  04/04 0659    Blood 1005 395.8     Total Intake(mL/kg) 1005 (11.6) 395.8 (4.6)     Urine (mL/kg/hr)  1400 (0.7) 250 (0.6)    Stool  0     Total Output  1400 250    Net +1005 -1004.2 -250           Stool Occurrence 1 x 1 x              Review of Systems   Respiratory:  Negative for shortness of breath.    Cardiovascular:  Negative for chest pain and leg swelling.   Gastrointestinal:  Positive for diarrhea. Negative for abdominal pain, nausea and vomiting.   Genitourinary:  Negative for difficulty urinating.   Skin:  Negative for rash.   Allergic/Immunologic: Positive for immunocompromised state.      Objective:     Vital Signs (Most Recent):  Temp: 97.9 °F (36.6 °C) (04/03/24 0529)  Pulse: 75 (04/03/24 1034)  Resp: 18 (04/03/24 0529)  BP: (!) 136/90 (04/03/24 1034)  SpO2: 97 % (04/03/24 1034) Vital Signs (24h Range):  Temp:  [97.9 °F (36.6 °C)-98.2 °F (36.8 °C)] 97.9 °F (36.6 °C)  Pulse:  [] 75  Resp:  [16-18] 18  SpO2:  [96 %-100 %] 97 %  BP: (136-171)/(76-91) 136/90     Weight: 86.7 kg (191 lb 1.2 oz)  Height: 6' 1" (185.4 cm)  Body mass index is 25.21 kg/m².     Physical Exam  Constitutional:       Appearance: Normal appearance.   Cardiovascular:      Rate and Rhythm: Normal rate and regular rhythm.   Pulmonary:      Effort: Pulmonary effort is normal.      Breath sounds: Normal breath sounds.   Abdominal: "      Palpations: Abdomen is soft. There is no mass.      Tenderness: There is no abdominal tenderness.   Musculoskeletal:      Right lower leg: No edema.      Left lower leg: No edema.          Laboratory:  CBC:   Recent Labs   Lab 04/02/24  0445 04/02/24  1553 04/03/24  0241   WBC 3.48* 4.21 4.76   RBC 2.96* 3.42* 3.47*   HGB 7.0* 8.4* 8.6*   HCT 23.8* 27.9* 28.4*   * 128* 129*   MCV 80* 82 82   MCH 23.6* 24.6* 24.8*   MCHC 29.4* 30.1* 30.3*     CMP:   Recent Labs   Lab 04/01/24  1708 04/02/24  0445 04/03/24  0241   * 95 99   CALCIUM 8.9 8.4* 8.5*   ALBUMIN 3.3* 3.1* 3.1*   PROT 6.5 5.5* 5.7*   * 134* 133*   K 4.3 3.9 4.5   CO2 22* 20* 23    104 103   BUN 32* 27* 29*   CREATININE 1.7* 1.5* 1.7*   ALKPHOS 47* 48* 48*   ALT <5* <5* <5*   AST 11 9* 10       Diagnostic Results:  None  Assessment/Plan:     * Symptomatic anemia  - Transfused a total of 3 units PRBC. H/H now stabilized  - GI consulted, no indication for scope at this time  - Unlikely that immunosuppression agents are cause of patient's anemia as patient has been on these meds for several years  - Recommend outpatient hematology referral for further work-up      H. pylori infection  -Initial rx plan with clarithromycin carries risk of major drug interaction with tacrolimus; changed to levofloxacin      Stage 3 chronic kidney disease  -CKD IIIa post transplant, overall stable with baseline cr 1.5-1.8,  -Avoid nephrotoxic agents (NSAIDs, IV contrast dye, ACEI/ARB anti-HTN medications, Aminoglycoside-containing antibiotics)  -Renally dose all appropriate medications, including antibiotics       Pancytopenia        Immunocompromised state due to drug therapy  He is post transplant, on prophylactic immunosuppression to prevent rejection. He remains in immunosuppressed state, at risk of infections and, rejection, and toxicity. Thus, ongoing monitoring is needed to assess for toxicities and other adverse effects.       S/P cadaveric  kidney transplant 11/5/2016. ESRD secondary to HTN/DMII  -Stable CKD, see CKD IIIb      Prophylactic immunotherapy  - Prograf level 12.5 on 4/3. Plan to hold prograf today. Restart prograf tomorrow at 0.5 mg BID.   - Target level 4-6  - Continue to hold mycophenolate at discharge. Restart Monday 4/8  - Outpatient labs Monday 4/8      Hypertension since 2000  -Cont carvedilol and monitor          Discharge Planning:  Per primary team. KTM will sign off. Please call with any questions or concerns.    Medical decision making for this encounter includes review of pertinent labs and diagnostic studies, assessment and planning, discussions with consulting providers, discussion with patient/family, and participation in multidisciplinary rounds. Time spent caring for patient: 60 minutes    Shai Tellez NP  Kidney Transplant  [facility] - Observation 11[facility]

## 2024-04-03 NOTE — DISCHARGE INSTRUCTIONS
Please HOLD prograf (tacrolimus) today and resume starting tomorrow (4/4). HOLD your cellcept for now, and RESUME on Monday (4/8).     It has been a pleasure caring for you, and I hope you continue to feel better! Please do not hesitate to reach out to me with any questions or concerns.     Aditi Walton PA-C  [facility] Medicine  [email or web address]

## 2024-04-03 NOTE — NURSING
Pt discharged to home with spouse via private car. DC instructions reviewed with patient. Pt had one question about cont. To take pepcid. MARK Pennington answered question and stated pt should take protonix for 14 days and then cont. Taking pepcid afterward. Pt and spouse verbalized understanding. IV dc'd. Tele dc'd.

## 2024-04-05 LAB
BLD PROD TYP BPU: NORMAL
BLD PROD TYP BPU: NORMAL
BLOOD UNIT EXPIRATION DATE: NORMAL
BLOOD UNIT EXPIRATION DATE: NORMAL
BLOOD UNIT TYPE CODE: 5100
BLOOD UNIT TYPE CODE: 5100
BLOOD UNIT TYPE: NORMAL
BLOOD UNIT TYPE: NORMAL
CODING SYSTEM: NORMAL
CODING SYSTEM: NORMAL
CROSSMATCH INTERPRETATION: NORMAL
CROSSMATCH INTERPRETATION: NORMAL
DISPENSE STATUS: NORMAL
DISPENSE STATUS: NORMAL
NUM UNITS TRANS PACKED RBC: NORMAL
NUM UNITS TRANS PACKED RBC: NORMAL

## 2024-04-08 ENCOUNTER — NURSE TRIAGE (OUTPATIENT)
Dept: ADMINISTRATIVE | Facility: CLINIC | Age: 69
End: 2024-04-08
Payer: MEDICARE

## 2024-04-08 ENCOUNTER — TELEPHONE (OUTPATIENT)
Dept: OPHTHALMOLOGY | Facility: CLINIC | Age: 69
End: 2024-04-08
Payer: MEDICARE

## 2024-04-08 ENCOUNTER — LAB VISIT (OUTPATIENT)
Dept: LAB | Facility: HOSPITAL | Age: 69
End: 2024-04-08
Payer: MEDICARE

## 2024-04-08 DIAGNOSIS — Z94.0 KIDNEY REPLACED BY TRANSPLANT: ICD-10-CM

## 2024-04-08 LAB
ALBUMIN SERPL BCP-MCNC: 2.9 G/DL (ref 3.5–5.2)
ANION GAP SERPL CALC-SCNC: 11 MMOL/L (ref 8–16)
ANISOCYTOSIS BLD QL SMEAR: ABNORMAL
BASOPHILS # BLD AUTO: ABNORMAL K/UL (ref 0–0.2)
BASOPHILS NFR BLD: 0 % (ref 0–1.9)
BUN SERPL-MCNC: 18 MG/DL (ref 8–23)
BURR CELLS BLD QL SMEAR: ABNORMAL
CALCIUM SERPL-MCNC: 8.6 MG/DL (ref 8.7–10.5)
CHLORIDE SERPL-SCNC: 104 MMOL/L (ref 95–110)
CO2 SERPL-SCNC: 19 MMOL/L (ref 23–29)
CREAT SERPL-MCNC: 1.9 MG/DL (ref 0.5–1.4)
DIFFERENTIAL METHOD BLD: ABNORMAL
EOSINOPHIL # BLD AUTO: ABNORMAL K/UL (ref 0–0.5)
EOSINOPHIL NFR BLD: 1 % (ref 0–8)
ERYTHROCYTE [DISTWIDTH] IN BLOOD BY AUTOMATED COUNT: 16.9 % (ref 11.5–14.5)
EST. GFR  (NO RACE VARIABLE): 38.4 ML/MIN/1.73 M^2
GLUCOSE SERPL-MCNC: 174 MG/DL (ref 70–110)
HCT VFR BLD AUTO: 28.7 % (ref 40–54)
HGB BLD-MCNC: 8.2 G/DL (ref 14–18)
HYPOCHROMIA BLD QL SMEAR: ABNORMAL
IMM GRANULOCYTES # BLD AUTO: ABNORMAL K/UL (ref 0–0.04)
IMM GRANULOCYTES NFR BLD AUTO: ABNORMAL % (ref 0–0.5)
LYMPHOCYTES # BLD AUTO: ABNORMAL K/UL (ref 1–4.8)
LYMPHOCYTES NFR BLD: 5 % (ref 18–48)
MCH RBC QN AUTO: 24.6 PG (ref 27–31)
MCHC RBC AUTO-ENTMCNC: 28.6 G/DL (ref 32–36)
MCV RBC AUTO: 86 FL (ref 82–98)
MONOCYTES # BLD AUTO: ABNORMAL K/UL (ref 0.3–1)
MONOCYTES NFR BLD: 10 % (ref 4–15)
NEUTROPHILS NFR BLD: 84 % (ref 38–73)
NRBC BLD-RTO: 1 /100 WBC
OVALOCYTES BLD QL SMEAR: ABNORMAL
PHOSPHATE SERPL-MCNC: 2.7 MG/DL (ref 2.7–4.5)
PLATELET # BLD AUTO: 152 K/UL (ref 150–450)
PLATELET BLD QL SMEAR: ABNORMAL
PMV BLD AUTO: ABNORMAL FL (ref 9.2–12.9)
POIKILOCYTOSIS BLD QL SMEAR: SLIGHT
POLYCHROMASIA BLD QL SMEAR: ABNORMAL
POTASSIUM SERPL-SCNC: 3.6 MMOL/L (ref 3.5–5.1)
RBC # BLD AUTO: 3.33 M/UL (ref 4.6–6.2)
SODIUM SERPL-SCNC: 134 MMOL/L (ref 136–145)
TACROLIMUS BLD-MCNC: 2.8 NG/ML (ref 5–15)
WBC # BLD AUTO: 7.33 K/UL (ref 3.9–12.7)

## 2024-04-08 PROCEDURE — 80069 RENAL FUNCTION PANEL: CPT | Performed by: INTERNAL MEDICINE

## 2024-04-08 PROCEDURE — 80197 ASSAY OF TACROLIMUS: CPT | Performed by: INTERNAL MEDICINE

## 2024-04-08 PROCEDURE — 85007 BL SMEAR W/DIFF WBC COUNT: CPT | Performed by: INTERNAL MEDICINE

## 2024-04-08 PROCEDURE — 36415 COLL VENOUS BLD VENIPUNCTURE: CPT | Performed by: INTERNAL MEDICINE

## 2024-04-08 PROCEDURE — 85027 COMPLETE CBC AUTOMATED: CPT | Performed by: INTERNAL MEDICINE

## 2024-04-08 RX ORDER — TACROLIMUS 0.5 MG/1
CAPSULE ORAL
Qty: 180 CAPSULE | Refills: 11 | Status: SHIPPED | OUTPATIENT
Start: 2024-04-08 | End: 2024-04-22

## 2024-04-08 NOTE — TELEPHONE ENCOUNTER
Pt is a kidney transplant and was transplanted in 2016. Pt is scheduled for transplant labs this morning and cbg is 78 mg/dl. Megan Dimas KYLE contacted and advised that the patient can take the sugar tabs and can eat a little if needed. Pt verbalized understanding.  Reason for Disposition   [1] Caller has URGENT medication or insulin pump question AND [2] triager unable to answer question    Additional Information   Negative: Unconscious or difficult to awaken   Negative: Seizure occurs   Negative: Acting confused (e.g., disoriented, slurred speech)   Negative: Very weak (e.g., can't stand)   Negative: Sounds like a life-threatening emergency to the triager   Negative: [1] Vomiting AND [2] signs of dehydration (e.g., very dry mouth, lightheaded, dark urine)   Negative: [1] Low blood sugar symptoms persist > 30 minutes AND [2] using low blood sugar Care Advice   Negative: [1] Low blood glucose (70 mg/dl [3.9 mmol/l] or below) persists > 30 minutes AND [2] using low blood sugar Care Advice   Negative: Patient sounds very sick or weak to the triager   Negative: [1] Low blood sugar symptoms with no other adult present AND [2] hasn't tried Care Advice   Negative: [1] Low blood glucose (70 mg/dl [3.9 mmol/l] or below)) with no other adult present AND [2] hasn't tried Care Advice   Negative: Diabetes drug error or overdose (e.g., insulin error or extra dose)    Protocols used: Diabetes - Low Blood Sugar-A-

## 2024-04-08 NOTE — TELEPHONE ENCOUNTER
Spoke with pt provided arrival time of 7:00 for sx on 4/10/24 with Dr. Palacio @ [facility]. Pt has eyedrops and packet.

## 2024-04-08 NOTE — TELEPHONE ENCOUNTER
Spoke to patient and instructed to increase tacrolimus to 2 mg in the morning and 1 mg at night.  Patient is currently taking 0.5 mg BID and was instructed to take 4 capsules in the morning and 2 capsules at night.  Repeat labs on 4/22.  Patient stated understanding of the new dose adjustment.       ----- Message from Bola Sosa MD sent at 4/8/2024 11:34 AM CDT -----  Increase prograf to 2/1 repeat a level in 2 weeks

## 2024-04-09 NOTE — PRE-PROCEDURE INSTRUCTIONS
Patient reviewed on 4/9/2024.  Okay to proceed at [facility]. The following pre-procedure instructions and arrival time have been reviewed with patient via phone and sent to patient portal for review.  Patient verbalized an understanding.  Pt to be accompanied by his wife-Erika day of procedure as responsible .      Dear Ravi    Below you will find basic pre-procedure instructions in preparation for your procedure on 4/10/24 with Dr. Palacio    You should have received your arrival time already from Dr's office.    - Nothing to eat or drink after midnight the night before your procedure until after your procedure, except AM meds with small sips of water.     - HOLD all oral Diabetic medications night before and morning of procedure  - HOLD all Insulin morning of procedure  - HOLD all Fluid pills morning of procedure  - HOLD all non-insulin shots until after surgery (Ozempic, Mounjaro, Trulicity, Victoza, Byetta, Wegovy and Adlyxin) (7 days prior)  - HOLD all vitamins, minerals and herbal supplements morning of procedure   - TAKE all B/P meds, EXCEPT those that contain a fluid pill (ex. Lasix, Hydroclorothiazide/HCTZ, Spirnolactone)  - USE inhalers as needed and bring AM of surgery  - USE eye drops as directed  -TAKE blood thinner meds AM of surgery unless otherwise instructed by your provider to not take     - Shower and wash face with dial soap for 3 mins PM prior and AM of surgery  - No powder, lotions, creams, oils, gels, ointments, makeup,  or jewelry    - Wear comfortable clothing (button up shirt)     (Patient is required to have a responsible ride to transport home, ride may not leave while patient is in surgery)     -- [facility], 2nd floor Surgery Center, located   @ [address] [place] [address]  2nd Floor Registration      If you have any questions or concerns please feel free to contact your surgeon's office.        Please reply to this message as receipt of  delivery.    Catina, LPN [facility]  Pre-Admit - Anesthesia Dept

## 2024-04-10 ENCOUNTER — HOSPITAL ENCOUNTER (OUTPATIENT)
Facility: HOSPITAL | Age: 69
Discharge: HOME | End: 2024-04-10
Attending: OPHTHALMOLOGY | Admitting: OPHTHALMOLOGY
Payer: MEDICARE

## 2024-04-10 VITALS
OXYGEN SATURATION: 98 % | HEART RATE: 90 BPM | TEMPERATURE: 98 F | RESPIRATION RATE: 16 BRPM | SYSTOLIC BLOOD PRESSURE: 148 MMHG | DIASTOLIC BLOOD PRESSURE: 87 MMHG

## 2024-04-10 DIAGNOSIS — H25.13 NUCLEAR SCLEROTIC CATARACT OF BOTH EYES: Primary | ICD-10-CM

## 2024-04-10 DIAGNOSIS — H25.12 AGE-RELATED NUCLEAR CATARACT, LEFT: ICD-10-CM

## 2024-04-10 LAB — POCT GLUCOSE: 175 MG/DL (ref 70–110)

## 2024-04-10 PROCEDURE — 27201423 OPTIME MED/SURG SUP & DEVICES STERILE SUPPLY: Performed by: OPHTHALMOLOGY

## 2024-04-10 PROCEDURE — 99900035 HC TECH TIME PER 15 MIN (STAT)

## 2024-04-10 PROCEDURE — 36000706: Performed by: OPHTHALMOLOGY

## 2024-04-10 PROCEDURE — 36000707: Performed by: OPHTHALMOLOGY

## 2024-04-10 PROCEDURE — V2632 POST CHMBR INTRAOCULAR LENS: HCPCS | Performed by: OPHTHALMOLOGY

## 2024-04-10 PROCEDURE — 82962 GLUCOSE BLOOD TEST: CPT | Performed by: OPHTHALMOLOGY

## 2024-04-10 PROCEDURE — 37000008 HC ANESTHESIA 1ST 15 MINUTES: Performed by: OPHTHALMOLOGY

## 2024-04-10 PROCEDURE — 63600175 PHARM REV CODE 636 W HCPCS: Performed by: OPHTHALMOLOGY

## 2024-04-10 PROCEDURE — 66984 XCAPSL CTRC RMVL W/O ECP: CPT | Mod: 79,LT,, | Performed by: OPHTHALMOLOGY

## 2024-04-10 PROCEDURE — 25000003 PHARM REV CODE 250: Performed by: OPHTHALMOLOGY

## 2024-04-10 PROCEDURE — 71000015 HC POSTOP RECOV 1ST HR: Performed by: OPHTHALMOLOGY

## 2024-04-10 PROCEDURE — 94761 N-INVAS EAR/PLS OXIMETRY MLT: CPT

## 2024-04-10 PROCEDURE — 37000009 HC ANESTHESIA EA ADD 15 MINS: Performed by: OPHTHALMOLOGY

## 2024-04-10 DEVICE — LENS EYHANCE +21.0D: Type: IMPLANTABLE DEVICE | Site: EYE | Status: FUNCTIONAL

## 2024-04-10 RX ORDER — ACETAMINOPHEN 325 MG/1
650 TABLET ORAL EVERY 4 HOURS PRN
Status: DISCONTINUED | OUTPATIENT
Start: 2024-04-10 | End: 2024-04-10 | Stop reason: HOSPADM

## 2024-04-10 RX ORDER — PREDNISOLONE ACETATE 10 MG/ML
SUSPENSION/ DROPS OPHTHALMIC
Status: DISCONTINUED | OUTPATIENT
Start: 2024-04-10 | End: 2024-04-10 | Stop reason: HOSPADM

## 2024-04-10 RX ORDER — PHENYLEPHRINE HYDROCHLORIDE 100 MG/ML
1 SOLUTION/ DROPS OPHTHALMIC
Status: DISCONTINUED | OUTPATIENT
Start: 2024-04-10 | End: 2024-04-10 | Stop reason: HOSPADM

## 2024-04-10 RX ORDER — LIDOCAINE HYDROCHLORIDE 10 MG/ML
INJECTION, SOLUTION EPIDURAL; INFILTRATION; INTRACAUDAL; PERINEURAL
Status: DISCONTINUED | OUTPATIENT
Start: 2024-04-10 | End: 2024-04-10 | Stop reason: HOSPADM

## 2024-04-10 RX ORDER — PHENYLEPHRINE HYDROCHLORIDE 25 MG/ML
1 SOLUTION/ DROPS OPHTHALMIC
Status: COMPLETED | OUTPATIENT
Start: 2024-04-10 | End: 2024-04-10

## 2024-04-10 RX ORDER — TETRACAINE HYDROCHLORIDE 5 MG/ML
1 SOLUTION OPHTHALMIC
Status: DISCONTINUED | OUTPATIENT
Start: 2024-04-10 | End: 2024-04-10 | Stop reason: HOSPADM

## 2024-04-10 RX ORDER — LIDOCAINE HYDROCHLORIDE 40 MG/ML
INJECTION, SOLUTION RETROBULBAR
Status: DISCONTINUED | OUTPATIENT
Start: 2024-04-10 | End: 2024-04-10 | Stop reason: HOSPADM

## 2024-04-10 RX ORDER — PROPARACAINE HYDROCHLORIDE 5 MG/ML
1 SOLUTION/ DROPS OPHTHALMIC
Status: DISCONTINUED | OUTPATIENT
Start: 2024-04-10 | End: 2024-04-10 | Stop reason: HOSPADM

## 2024-04-10 RX ORDER — TETRACAINE HYDROCHLORIDE 5 MG/ML
1 SOLUTION OPHTHALMIC
Status: CANCELLED | OUTPATIENT
Start: 2024-04-10

## 2024-04-10 RX ORDER — PHENYLEPHRINE HYDROCHLORIDE 25 MG/ML
1 SOLUTION/ DROPS OPHTHALMIC
Status: CANCELLED | OUTPATIENT
Start: 2024-04-10

## 2024-04-10 RX ORDER — MOXIFLOXACIN 5 MG/ML
1 SOLUTION/ DROPS OPHTHALMIC
Status: COMPLETED | OUTPATIENT
Start: 2024-04-10 | End: 2024-04-10

## 2024-04-10 RX ORDER — MIDAZOLAM HYDROCHLORIDE 1 MG/ML
2 INJECTION, SOLUTION INTRAMUSCULAR; INTRAVENOUS
Status: COMPLETED | OUTPATIENT
Start: 2024-04-10 | End: 2024-04-10

## 2024-04-10 RX ORDER — TROPICAMIDE 10 MG/ML
1 SOLUTION/ DROPS OPHTHALMIC
Status: COMPLETED | OUTPATIENT
Start: 2024-04-10 | End: 2024-04-10

## 2024-04-10 RX ORDER — CYCLOP/TROP/PROPA/PHEN/KET/WAT 1-1-0.1%
1 DROPS (EA) OPHTHALMIC (EYE)
Status: DISCONTINUED | OUTPATIENT
Start: 2024-04-10 | End: 2024-04-10

## 2024-04-10 RX ORDER — FENTANYL CITRATE 50 UG/ML
25 INJECTION, SOLUTION INTRAMUSCULAR; INTRAVENOUS EVERY 5 MIN PRN
Status: DISCONTINUED | OUTPATIENT
Start: 2024-04-10 | End: 2024-04-10 | Stop reason: HOSPADM

## 2024-04-10 RX ORDER — MOXIFLOXACIN 5 MG/ML
SOLUTION/ DROPS OPHTHALMIC
Status: DISCONTINUED | OUTPATIENT
Start: 2024-04-10 | End: 2024-04-10 | Stop reason: HOSPADM

## 2024-04-10 RX ADMIN — PHENYLEPHRINE HYDROCHLORIDE 1 DROP: 25 SOLUTION/ DROPS OPHTHALMIC at 07:04

## 2024-04-10 RX ADMIN — TETRACAINE HYDROCHLORIDE 1 DROP: 5 SOLUTION/ DROPS OPHTHALMIC at 07:04

## 2024-04-10 RX ADMIN — MOXIFLOXACIN 1 DROP: 5 SOLUTION/ DROPS OPHTHALMIC at 09:04

## 2024-04-10 RX ADMIN — MIDAZOLAM HYDROCHLORIDE 2 MG: 1 INJECTION, SOLUTION INTRAMUSCULAR; INTRAVENOUS at 08:04

## 2024-04-10 RX ADMIN — MOXIFLOXACIN OPHTHALMIC 1 DROP: 5 SOLUTION/ DROPS OPHTHALMIC at 07:04

## 2024-04-10 RX ADMIN — TROPICAMIDE 1 DROP: 10 SOLUTION/ DROPS OPHTHALMIC at 07:04

## 2024-04-10 RX ADMIN — MOXIFLOXACIN 1 DROP: 5 SOLUTION/ DROPS OPHTHALMIC at 08:04

## 2024-04-10 RX ADMIN — MIDAZOLAM HYDROCHLORIDE 1 MG: 1 INJECTION, SOLUTION INTRAMUSCULAR; INTRAVENOUS at 08:04

## 2024-04-10 NOTE — OP NOTE
DATE OF PROCEDURE: 04/10/2024    SURGEON: AZUCENA CONTRERAS MD    PREOPERATIVE DIAGNOSIS:  Senile nuclear sclerotic cataract left eye.     POSTOPERATIVE DIAGNOSIS: Senile nuclear sclerotic cataract left eye.     PROCEDURE PERFORMED:  Phacoemulsification with placement of intraocular lens, left eye.    IMPLANT:  DIB00 21.0    Anesthesia: Moderate sedation with topical Lidocaine. Patient ID confirmed and re-evaluated the patient and anesthesia plan confirming it is suitable for the patient's condition and procedure.     COMPLICATIONS: none    ESTIMATED BLOOD LOSS: <1cc    SPECIMENS: none    INDICATIONS FOR PROCEDURE:   The patient has a history of painless progressive vision loss.  The patient has described difficulties with activities of daily living, which specifically include driving, which is secondary to cataract formation and progression. After we had a thorough discussion about risks, benefits, and alternatives to cataract surgery, the patient agreed to proceed with phacoemulsification and implantation of a lens in the left eye.  These risks include, but are not limited to, hemorrhage, pain, infection, need for additional surgery, need for glasses or contacts, loss of vision, or even loss of the eye.    PROCEDURE IN DETAIL:  The patient was met in the preop holding area.  Consent was confirmed to be signed.  The operative site was marked.  The patient was brought into the operating room by the anesthesia team and placed under monitored anesthesia care.  The left eye was prepped and draped in a sterile ophthalmic fashion.  A Kasie speculum was placed into the left eye.   A paracentesis site was made and 1% preservative-free lidocaine was injected into the anterior chamber.  Viscoelastic  material was injected into the anterior chamber.  A keratome blade was used to make a clear corneal incision.  A cystotome was used to initiate the continuous curvilinear capsulorrhexis which was completed with Utrata forceps.   BSS on a mejia cannula was used to perform hydrodissection.  The phacoemulsification tip was introduced into the eye and the nucleus was removed in a standard divide-and-conquer fashion.  Remaining cortical material was removed from the eye using irrigation-aspiration.  The capsular bag was filled with viscoelastic material and the intraocular lens was injected and positioned into place. Remaining viscoelastic material was removed from the eye using irrigation and aspiration.  The corneal wounds were hydrated.  The eye was filled to physiologic pressure. The wounds were found to be watertight. Drops of Vigamox and prednisilone were placed into the eye.  The eye was washed, dried, and shielded.  The patient tolerated the procedure well and knows to follow up with me tomorrow morning, sooner if needed.

## 2024-04-10 NOTE — DISCHARGE INSTRUCTIONS
Dr. Palacio     Cataract Post-Operative Instructions       Day of surgery:     -Resume drops THREE times daily into the operative eye.     -Do not rub your eye     -Wear protective sunglasses during the day.     -Resume moderate activity.     -Bathe/shower/wash face normally     -Do not apply makeup around the operative eye for 1 week.     -You should expect:     Blurry vision and halos for 24-48 hours     Dilated pupil for 24-48 hours     Scratchy feeling in the eye for 1-2 days     Curved shadow in your peripheral vision for 2-3 weeks     Occasional flickering of lights for up to 1 week     -If you experience severe pain or nausea, call Dr. Palacio or the on-call doctor at [phone].       Plan to see Dr. Palacio tomorrow at:     [facility] [address]     10TH FLOOR     [facility] [id number]     **Most patients can drive the next morning.  If you do not feel comfortable driving, please arrange for transportation. **

## 2024-04-10 NOTE — H&P
History    Chief complaint:  Painless progressive vision loss    Present Ilness/Diagnosis: Nuclear sclerotic Cataract    Past Medical History:  has a past medical history of Anxiety (7/29/2014), Arthritis, Bilateral diabetic retinopathy (2017), CKD (chronic kidney disease) stage 2, GFR 60-89 ml/min (12/28/2016), CKD (chronic kidney disease) stage 4, GFR 15-29 ml/min (7/29/2014), Colon polyps (2014), Depression - situational (7/29/2014), Diabetes mellitus, Diabetes type 2 since 2000 (7/29/2014), Diabetic neuropathy (7/29/2014), History of cardioversion (10/3/2019), History of hepatitis C, s/p successful Rx w/ SVR24 - 9/2017 (7/29/2014), Hyperlipidemia (7/29/2014), Hypertension, Neuropathy, Organ transplant candidate (7/29/2014), Pancreatitis, S/P cadaveric kidney transplant 11/5/2016. ESRD secondary to HTN/DMII (11/5/2016), Stage 3a chronic kidney disease (12/28/2016), and Type 2 diabetes mellitus with stage 3a chronic kidney disease, with long-term current use of insulin (7/29/2014).    Family History/Social History: refer to chart    Allergies:   Review of patient's allergies indicates:   Allergen Reactions    Nifedipine Other (See Comments)     Gingival hyperplasia       Current Medications: No current facility-administered medications for this encounter.    Current Outpatient Medications:     amoxicillin (AMOXIL) 500 MG capsule, Take 2 capsules (1,000 mg total) by mouth every 12 (twelve) hours. for 12 days, Disp: 48 capsule, Rfl: 0    apixaban (ELIQUIS) 5 mg Tab, Take 1 tablet (5 mg total) by mouth 2 (two) times daily., Disp: 60 tablet, Rfl: 11    aspirin 81 MG Chew, Take 81 mg by mouth once daily., Disp: , Rfl:     atorvastatin (LIPITOR) 40 MG tablet, Take 1 tablet (40 mg total) by mouth once daily., Disp: 90 tablet, Rfl: 3    blood-glucose sensor Gin Garza 3, use as directed, change every 14 days , e 11.65, Disp: 2 each, Rfl: 11    carvediloL (COREG) 25 MG tablet, Take 1 tablet (25 mg total) by mouth 2 (two)  "times daily., Disp: 180 tablet, Rfl: 3    empagliflozin (JARDIANCE) 10 mg tablet, Take 1 tablet (10 mg total) by mouth once daily., Disp: 30 tablet, Rfl: 11    famotidine (PEPCID) 20 MG tablet, Take 1 tablet (20 mg total) by mouth every evening., Disp: 90 tablet, Rfl: 3    gabapentin (NEURONTIN) 300 MG capsule, Take 1 capsule by mouth in the morning, 1 capsule midday, and 3 capsules at night., Disp: 450 capsule, Rfl: 3    insulin (LANTUS SOLOSTAR U-100 INSULIN) glargine 100 units/mL SubQ pen, Inject 20 Units into the skin every morning., Disp: 15 mL, Rfl: 6    insulin aspart U-100 (NOVOLOG) 100 unit/mL (3 mL) InPn pen, Inject 16 units w/ breakfast, 10 units at lunch and dinner scale 180-230+2, 231-280+4, 281-330+6, 331-380+8, >380+10.  *Max daily 66 units.*, Disp: 30 mL, Rfl: 6    levoFLOXacin (LEVAQUIN) 500 MG tablet, Take 1 tablet (500 mg total) by mouth once daily. for 12 days, Disp: 12 tablet, Rfl: 0    meclizine (ANTIVERT) 25 mg tablet, Take 1 tablet (25 mg total) by mouth 3 (three) times daily as needed for Dizziness., Disp: 21 tablet, Rfl: 3    mycophenolate (CELLCEPT) 250 mg Cap, Take 4 capsules (1,000 mg total) by mouth 2 (two) times daily. HOLD cellcept until Monday (4/8), Disp: 240 capsule, Rfl: 11    pantoprazole (PROTONIX) 40 MG tablet, Take 1 tablet (40 mg total) by mouth 2 (two) times a day. for 14 days, Disp: 28 tablet, Rfl: 0    pen needle, diabetic (BD ULTRA-FINE LO PEN NEEDLE) 32 gauge x 5/32" Ndle, USE TO ADMINISTER INSULIN 4 TIMES DAILY., Disp: 400 each, Rfl: 3    pen needle, diabetic (BD ULTRA-FINE LO PEN NEEDLE) 32 gauge x 5/32" Ndle, use four times a day, Disp: 100 each, Rfl: 11    predniSONE (DELTASONE) 5 MG tablet, Take 1 tablet (5 mg total) by mouth once daily., Disp: 30 tablet, Rfl: 11    tacrolimus (PROGRAF) 0.5 MG Cap, Take 4 capsules (2 mg total) by mouth every morning AND 2 capsules (1 mg total) every evening., Disp: 180 capsule, Rfl: 11    valsartan (DIOVAN) 160 MG tablet, Take 1 " tablet (160 mg total) by mouth once daily., Disp: 90 tablet, Rfl: 3    Facility-Administered Medications Ordered in Other Encounters:     balanced salt irrigation intra-ocular solution 1 drop, 1 drop, Right Eye, On Call Procedure, eJnnifer Palacio MD    phenylephrine HCL 10% ophthalmic solution 1 drop, 1 drop, Right Eye, PRN, Jennifer Palacio MD    proparacaine 0.5 % ophthalmic solution 1 drop, 1 drop, Right Eye, Daily PRN, Jennifer Palacio MD    sodium chloride 0.9% flush 10 mL, 10 mL, Intravenous, PRN, Jennifer Palacio MD    TETRAcaine HCl (PF) 0.5 % Drop 1 drop, 1 drop, Right Eye, PRN, Jennifer Palacio MD    ASA Score: II     Mallampati Score: II     Plan for Sedation: Moderate Sedation     Patient or Family History of Anesthesia problems : No    Physical Exam    BP: Vital signs stable  General: No apparent distress  HEENT: nuclear sclerotic cataract  Lungs: adequate respirations  Heart: + pulses  Abdomen: soft  Rectal/pelvic: deferred    Impression: Visually significant Cataract.    See previous clinic notes for surgical indications.    Plan: Phacoemulsification with implantation of Intraocular lens

## 2024-04-10 NOTE — PLAN OF CARE
Pt in preop bay 36, VSS, meds given and IV inserted. Pt denies any open wounds on body or the use of any weight loss injections. Pt needs site markings, otherwise ready to roll.    Procedural consents verified with pt.

## 2024-04-10 NOTE — DISCHARGE SUMMARY
[facility]  Discharge Note  Short Stay    Procedure(s) (LRB):  EXTRACTION, CATARACT, WITH IOL INSERTION (Left)  BRIEF DISCHARGE NOTE:    Date of discharge: 04/10/2024    Reason for hospitalization -  Cataract surgery     Final Diagnosis - Visually significant Cataract    Procedures and treatment provided - Status post phacoemulsification with placement of intraocular lens     Diet - Advance to regular as tolerated    Activity - as tolerated    Disposition at the end of the case - Good.    Discharge: to home    The patient tolerated the procedure well and knows to follow up with me tomorrow morning in the eye clinic, sooner if needed.    Patient and family instructions (as appropriate) - Given to patient on discharge    Jennifer Palacio MD

## 2024-04-11 ENCOUNTER — OFFICE VISIT (OUTPATIENT)
Dept: OPHTHALMOLOGY | Facility: CLINIC | Age: 69
End: 2024-04-11
Payer: MEDICARE

## 2024-04-11 DIAGNOSIS — Z98.42 STATUS POST CATARACT EXTRACTION AND INSERTION OF INTRAOCULAR LENS, LEFT: Primary | ICD-10-CM

## 2024-04-11 DIAGNOSIS — Z96.1 STATUS POST CATARACT EXTRACTION AND INSERTION OF INTRAOCULAR LENS, LEFT: Primary | ICD-10-CM

## 2024-04-11 PROCEDURE — 3288F FALL RISK ASSESSMENT DOCD: CPT | Mod: CPTII,S$GLB,, | Performed by: OPHTHALMOLOGY

## 2024-04-11 PROCEDURE — 99024 POSTOP FOLLOW-UP VISIT: CPT | Mod: S$GLB,,, | Performed by: OPHTHALMOLOGY

## 2024-04-11 PROCEDURE — 1101F PT FALLS ASSESS-DOCD LE1/YR: CPT | Mod: CPTII,S$GLB,, | Performed by: OPHTHALMOLOGY

## 2024-04-11 PROCEDURE — 3052F HG A1C>EQUAL 8.0%<EQUAL 9.0%: CPT | Mod: CPTII,S$GLB,, | Performed by: OPHTHALMOLOGY

## 2024-04-11 PROCEDURE — 1126F AMNT PAIN NOTED NONE PRSNT: CPT | Mod: CPTII,S$GLB,, | Performed by: OPHTHALMOLOGY

## 2024-04-11 PROCEDURE — 4010F ACE/ARB THERAPY RXD/TAKEN: CPT | Mod: CPTII,S$GLB,, | Performed by: OPHTHALMOLOGY

## 2024-04-11 PROCEDURE — 99999 PR PBB SHADOW E&M-EST. PATIENT-LVL III: CPT | Mod: PBBFAC,,, | Performed by: OPHTHALMOLOGY

## 2024-04-12 DIAGNOSIS — R06.09 DOE (DYSPNEA ON EXERTION): ICD-10-CM

## 2024-04-12 DIAGNOSIS — I48.91 NEW ONSET A-FIB: ICD-10-CM

## 2024-04-12 NOTE — TELEPHONE ENCOUNTER
Refill Routing Note   Medication(s) are not appropriate for processing by [facility] for the following reason(s):        Outside of protocol    [facility] action(s):  Route               Appointments  past 12m or future 3m with PCP    Date Provider   Last Visit   4/1/2024 Mima Mack MD   Next Visit   5/21/2024 Mima Mack MD   ED visits in past 90 days: 0        Note composed:3:05 PM 04/12/2024

## 2024-04-12 NOTE — TELEPHONE ENCOUNTER
----- Message from Brittany Price sent at 4/12/2024 11:35 AM CDT -----  Contact: Self [phone]  Prescription refill request.  RX name and strength (copy/paste from chart):   apixaban (ELIQUIS) 5 mg Tab     Pharmacy name and phone # (copy/paste from chart):   [facility]    Additional information:

## 2024-04-16 ENCOUNTER — OFFICE VISIT (OUTPATIENT)
Dept: INTERNAL MEDICINE | Facility: CLINIC | Age: 69
End: 2024-04-16
Payer: MEDICARE

## 2024-04-16 VITALS
DIASTOLIC BLOOD PRESSURE: 70 MMHG | HEART RATE: 88 BPM | WEIGHT: 204.06 LBS | SYSTOLIC BLOOD PRESSURE: 134 MMHG | BODY MASS INDEX: 27.04 KG/M2 | HEIGHT: 73 IN

## 2024-04-16 DIAGNOSIS — Z79.4 TYPE 2 DIABETES MELLITUS WITH STAGE 3A CHRONIC KIDNEY DISEASE, WITH LONG-TERM CURRENT USE OF INSULIN: ICD-10-CM

## 2024-04-16 DIAGNOSIS — I48.0 PAROXYSMAL A-FIB: ICD-10-CM

## 2024-04-16 DIAGNOSIS — D64.9 SYMPTOMATIC ANEMIA: ICD-10-CM

## 2024-04-16 DIAGNOSIS — E11.22 TYPE 2 DIABETES MELLITUS WITH STAGE 3A CHRONIC KIDNEY DISEASE, WITH LONG-TERM CURRENT USE OF INSULIN: ICD-10-CM

## 2024-04-16 DIAGNOSIS — E78.2 MIXED HYPERLIPIDEMIA: ICD-10-CM

## 2024-04-16 DIAGNOSIS — I10 PRIMARY HYPERTENSION: Primary | ICD-10-CM

## 2024-04-16 DIAGNOSIS — N18.31 TYPE 2 DIABETES MELLITUS WITH STAGE 3A CHRONIC KIDNEY DISEASE, WITH LONG-TERM CURRENT USE OF INSULIN: ICD-10-CM

## 2024-04-16 DIAGNOSIS — Z94.0 S/P KIDNEY TRANSPLANT: ICD-10-CM

## 2024-04-16 DIAGNOSIS — A04.8 H. PYLORI INFECTION: ICD-10-CM

## 2024-04-16 PROCEDURE — 1111F DSCHRG MED/CURRENT MED MERGE: CPT | Mod: CPTII,S$GLB,, | Performed by: PHYSICIAN ASSISTANT

## 2024-04-16 PROCEDURE — 3078F DIAST BP <80 MM HG: CPT | Mod: CPTII,S$GLB,, | Performed by: PHYSICIAN ASSISTANT

## 2024-04-16 PROCEDURE — 3052F HG A1C>EQUAL 8.0%<EQUAL 9.0%: CPT | Mod: CPTII,S$GLB,, | Performed by: PHYSICIAN ASSISTANT

## 2024-04-16 PROCEDURE — 99214 OFFICE O/P EST MOD 30 MIN: CPT | Mod: S$GLB,,, | Performed by: PHYSICIAN ASSISTANT

## 2024-04-16 PROCEDURE — 3075F SYST BP GE 130 - 139MM HG: CPT | Mod: CPTII,S$GLB,, | Performed by: PHYSICIAN ASSISTANT

## 2024-04-16 PROCEDURE — 3008F BODY MASS INDEX DOCD: CPT | Mod: CPTII,S$GLB,, | Performed by: PHYSICIAN ASSISTANT

## 2024-04-16 PROCEDURE — 1159F MED LIST DOCD IN RCRD: CPT | Mod: CPTII,S$GLB,, | Performed by: PHYSICIAN ASSISTANT

## 2024-04-16 PROCEDURE — 4010F ACE/ARB THERAPY RXD/TAKEN: CPT | Mod: CPTII,S$GLB,, | Performed by: PHYSICIAN ASSISTANT

## 2024-04-16 PROCEDURE — 99999 PR PBB SHADOW E&M-EST. PATIENT-LVL V: CPT | Mod: PBBFAC,,, | Performed by: PHYSICIAN ASSISTANT

## 2024-04-16 PROCEDURE — 1126F AMNT PAIN NOTED NONE PRSNT: CPT | Mod: CPTII,S$GLB,, | Performed by: PHYSICIAN ASSISTANT

## 2024-04-16 PROCEDURE — 1160F RVW MEDS BY RX/DR IN RCRD: CPT | Mod: CPTII,S$GLB,, | Performed by: PHYSICIAN ASSISTANT

## 2024-04-16 RX ORDER — HYDROCHLOROTHIAZIDE 12.5 MG/1
12.5 TABLET ORAL DAILY PRN
Qty: 30 TABLET | Refills: 0 | Status: SHIPPED | OUTPATIENT
Start: 2024-04-16 | End: 2024-04-23

## 2024-04-16 NOTE — PROGRESS NOTES
Subjective:       Patient ID: Ravi Zamorano is a 68 y.o. male.        Chief Complaint: Hospital Follow Up    Ravi Zamorano is an established patient of Mima Mack MD here today for hospital f/u visit.    His wife is with him today and assists with history.    +H pylori and tx with levaquin, protonix, amoxil, previously with diarrhea but now resolved.  Admitted 4/1-4/3 due to abnormal labs.  Hemoglobin was 5.7 and sent to ED for transfusion.  H/H improved s/p transfusion, total of 3U.  He has hematology f/u upcoming.  Repeat lab work showed stable H/H a few days ago.      HTN -   Valsartan 160 mg daily - 4/1/24 saw Dr. Mack, valsartan 320 mg was on med list but he was not taking, she restarted valsartan 40 mg daily, while in hospital dose titrated up to 160 mg daily  Coreg 25 mg BID    Spironolactone 25 mg daily - d/c 3/2024 due to getting LH and dizzy  Hctz 25 mg daily - d/c 4/2024 in hospital but since he has had some mild swelling in legs that is bothersome    Hydralazine 25 mg BID prn elevated BP, has not used in months     Chlorthalidone d/c 4/27/23 due to symptomatic hypotension, feeling much better since d/c  Nifedipine --> gingival hyperplasia     Anxiety - really bad while driving or riding in the car, long standing, his wife has offered to drive him     DM -   Lantus 20 U daily  Novolog with meals  Karan Lopez NP follows    Lab Results       Component                Value               Date                       HGBA1C                   8.3 (H)             03/11/2024                 HGBA1C                   8.4 (H)             11/14/2023                 HGBA1C                   7.5 (H)             06/07/2023                     S/p kidney transplant 11/5/16 secondary to ESRD from HTN and DM     Afib s/p cardioverson 8/2019 -   Followed by EP  Eliquis 5 mg BID  Coreg 25 mg BID    Flecainide d/c 2/2024     EP rec repeat cardioversion, he has elected to proceed with this     CKD                 Review of Systems    Objective:      Physical Exam  Vitals and nursing note reviewed.   Constitutional:       Appearance: He is well-developed.   HENT:      Head: Normocephalic.      Right Ear: External ear normal.      Left Ear: External ear normal.   Eyes:      Pupils: Pupils are equal, round, and reactive to light.   Cardiovascular:      Rate and Rhythm: Normal rate. Rhythm irregularly irregular.      Heart sounds: Normal heart sounds. No murmur heard.     No friction rub. No gallop.      Comments: Trace edema BLE, non pitting   Pulmonary:      Effort: Pulmonary effort is normal. No respiratory distress.      Breath sounds: Normal breath sounds.   Abdominal:      Palpations: Abdomen is soft.      Tenderness: There is no abdominal tenderness.   Musculoskeletal:         General: No swelling.   Skin:     General: Skin is warm and dry.   Neurological:      General: No focal deficit present.      Mental Status: He is alert.   Psychiatric:         Mood and Affect: Mood normal.         Assessment:       1. Primary hypertension    2. Mixed hyperlipidemia    3. Paroxysmal A-fib    4. S/P cadaveric kidney transplant 11/5/2016. ESRD secondary to HTN/DMII    5. Symptomatic anemia    6. Type 2 diabetes mellitus with stage 3a chronic kidney disease, with long-term current use of insulin    7. H. pylori infection        Plan:       Ravi was seen today for hospital follow up.    Diagnoses and all orders for this visit:    Primary hypertension - stable and controlled, continue current regimen  BP Readings from Last 5 Encounters:   04/16/24 134/70   04/10/24 (!) 148/87   04/03/24 (!) 167/87   04/01/24 138/62   03/14/24 124/62      Mixed hyperlipidemia - stable and controlled, continue current regimen  Lab Results   Component Value Date    CHOL 131 06/07/2023    TRIG 193 (H) 06/07/2023    HDL 31 (L) 06/07/2023    LDLCALC 61.4 (L) 06/07/2023     Paroxysmal A-fib - will loop in with EP regarding afib, continue  "eliquis    S/P cadaveric kidney transplant 11/5/2016. ESRD secondary to HTN/DMII    Symptomatic anemia - H/H stable    Type 2 diabetes mellitus with stage 3a chronic kidney disease, with long-term current use of insulin - continue f/u with Karan Lopez NP  Lab Results   Component Value Date    HGBA1C 8.3 (H) 03/11/2024    HGBA1C 8.4 (H) 11/14/2023    HGBA1C 7.5 (H) 06/07/2023     H. pylori infection - take all meds as prescribed    Other orders  -     hydroCHLOROthiazide (HYDRODIURIL) 12.5 MG Tab; Take 1 tablet (12.5 mg total) by mouth daily as needed (swelling).    Will add hctz 12.5 mg daily x 3 days to help with swelling  He has cardiology f/u 4/18  Hctz 25 mg stopped while in hospital    Pt has been given instructions populated from patient instructions database and has verbalized understanding of the after visit summary and information contained wherein.    Follow up with a primary care provider. May go to ER for acute shortness of breath, lightheadedness, fever, or any other emergent complaints or changes in condition.    "This note will be shared with the patient"    Future Appointments   Date Time Provider Department Center   4/18/2024  9:30 AM Darwin Gottlieb MD [facility]   4/22/2024  8:30 AM LAB, APPOINTMENT [facility] LAB VNP [facility]   5/13/2024 10:40 AM Deepa Shelton OD [facility] [place]   5/20/2024 11:00 AM Tico De La Cruz MD [id number]   5/21/2024  8:30 AM [facility]   7/3/2024  2:00 PM Syl Dave, DPM [facility] POD [facility]   7/10/2024  9:30 AM Estuardo Obrien MD [facility]   7/23/2024  1:00 PM Karan Lopez, APRN, FNP [facility]                 "

## 2024-04-18 ENCOUNTER — OFFICE VISIT (OUTPATIENT)
Dept: CARDIOLOGY | Facility: CLINIC | Age: 69
End: 2024-04-18
Payer: MEDICARE

## 2024-04-18 VITALS
WEIGHT: 207.25 LBS | SYSTOLIC BLOOD PRESSURE: 147 MMHG | DIASTOLIC BLOOD PRESSURE: 85 MMHG | HEIGHT: 73 IN | HEART RATE: 67 BPM | BODY MASS INDEX: 27.47 KG/M2

## 2024-04-18 DIAGNOSIS — Z94.0 S/P KIDNEY TRANSPLANT: ICD-10-CM

## 2024-04-18 DIAGNOSIS — E11.3393 TYPE 2 DIABETES MELLITUS WITH BOTH EYES AFFECTED BY MODERATE NONPROLIFERATIVE RETINOPATHY WITHOUT MACULAR EDEMA, WITHOUT LONG-TERM CURRENT USE OF INSULIN: ICD-10-CM

## 2024-04-18 DIAGNOSIS — I73.9 CLAUDICATION OF BOTH LOWER EXTREMITIES: Primary | ICD-10-CM

## 2024-04-18 DIAGNOSIS — E11.22 TYPE 2 DIABETES MELLITUS WITH STAGE 3A CHRONIC KIDNEY DISEASE, WITH LONG-TERM CURRENT USE OF INSULIN: ICD-10-CM

## 2024-04-18 DIAGNOSIS — E78.00 PURE HYPERCHOLESTEROLEMIA: ICD-10-CM

## 2024-04-18 DIAGNOSIS — I73.9 PAD (PERIPHERAL ARTERY DISEASE): ICD-10-CM

## 2024-04-18 DIAGNOSIS — I10 PRIMARY HYPERTENSION: ICD-10-CM

## 2024-04-18 DIAGNOSIS — N18.30 STAGE 3 CHRONIC KIDNEY DISEASE, UNSPECIFIED WHETHER STAGE 3A OR 3B CKD: ICD-10-CM

## 2024-04-18 DIAGNOSIS — I50.32 CHRONIC DIASTOLIC CONGESTIVE HEART FAILURE: ICD-10-CM

## 2024-04-18 DIAGNOSIS — N18.31 TYPE 2 DIABETES MELLITUS WITH STAGE 3A CHRONIC KIDNEY DISEASE, WITH LONG-TERM CURRENT USE OF INSULIN: ICD-10-CM

## 2024-04-18 DIAGNOSIS — Z79.4 TYPE 2 DIABETES MELLITUS WITH STAGE 3A CHRONIC KIDNEY DISEASE, WITH LONG-TERM CURRENT USE OF INSULIN: ICD-10-CM

## 2024-04-18 DIAGNOSIS — I48.0 PAROXYSMAL A-FIB: ICD-10-CM

## 2024-04-18 DIAGNOSIS — Z79.01 ANTICOAGULANT LONG-TERM USE: ICD-10-CM

## 2024-04-18 PROCEDURE — 99999 PR PBB SHADOW E&M-EST. PATIENT-LVL IV: CPT | Mod: PBBFAC,,, | Performed by: INTERNAL MEDICINE

## 2024-04-18 PROCEDURE — 3008F BODY MASS INDEX DOCD: CPT | Mod: CPTII,S$GLB,, | Performed by: INTERNAL MEDICINE

## 2024-04-18 PROCEDURE — 3079F DIAST BP 80-89 MM HG: CPT | Mod: CPTII,S$GLB,, | Performed by: INTERNAL MEDICINE

## 2024-04-18 PROCEDURE — 3077F SYST BP >= 140 MM HG: CPT | Mod: CPTII,S$GLB,, | Performed by: INTERNAL MEDICINE

## 2024-04-18 PROCEDURE — 1111F DSCHRG MED/CURRENT MED MERGE: CPT | Mod: CPTII,S$GLB,, | Performed by: INTERNAL MEDICINE

## 2024-04-18 PROCEDURE — 4010F ACE/ARB THERAPY RXD/TAKEN: CPT | Mod: CPTII,S$GLB,, | Performed by: INTERNAL MEDICINE

## 2024-04-18 PROCEDURE — 1101F PT FALLS ASSESS-DOCD LE1/YR: CPT | Mod: CPTII,S$GLB,, | Performed by: INTERNAL MEDICINE

## 2024-04-18 PROCEDURE — 1159F MED LIST DOCD IN RCRD: CPT | Mod: CPTII,S$GLB,, | Performed by: INTERNAL MEDICINE

## 2024-04-18 PROCEDURE — 99215 OFFICE O/P EST HI 40 MIN: CPT | Mod: S$GLB,,, | Performed by: INTERNAL MEDICINE

## 2024-04-18 PROCEDURE — 3052F HG A1C>EQUAL 8.0%<EQUAL 9.0%: CPT | Mod: CPTII,S$GLB,, | Performed by: INTERNAL MEDICINE

## 2024-04-18 PROCEDURE — 3288F FALL RISK ASSESSMENT DOCD: CPT | Mod: CPTII,S$GLB,, | Performed by: INTERNAL MEDICINE

## 2024-04-18 PROCEDURE — 1126F AMNT PAIN NOTED NONE PRSNT: CPT | Mod: CPTII,S$GLB,, | Performed by: INTERNAL MEDICINE

## 2024-04-18 NOTE — PROGRESS NOTES
[facility]      Chief Complaint   Patient presents with    Walking difficulty due to lower leg       Patient ID: Ravi Zamorano is a 68 y.o. male with pAF (on Eliquis), HTN, HLD, DM2, hx of ESRD previously on HD status post  donor kidney transplant, smoking, who presetns for a follow up appointment.  Pertinent history/events are as follows:     -Pt kindly referred by Dr. Mack for evaluation of walking difficulty due to lower leg.    -At our initial clinic visit on 2023, Mr. Zamorano reportsed pain in calves and SOB after walking half a block, which improves with rest.  Symptoms started 1 year ago.  He has no rest pain or tissue loss.  Formerly smoked half a pack a day for total of 43 years.  Quit in 2016.  WENDI Study on 2023 revealed Right WENDI 0.79 with Left WENDI 0.77.  Right Leg: Segmental pressures and PVR waveforms suggest multi-level mild to moderate peripheral arterial occlusive disease.  Left Leg: Segmental pressures and PVR waveforms suggest multi-level mild to moderate peripheral arterial occlusive disease.  Findings consistent with aorto-iliac disease. Pt with normal myocardial perfusion scan in 2023.  Plan:   PAD- Mr. Zamorano presents with Lane stage 3 claudication symptoms.  No rest pain or tissue loss.  Will hold off of starting cilostazol due to potential interaction with flecainide for QT interval prolongation.  Continue ASA 81 mg daily, Eliqus 5 mg bid, and atorvastatin 40 mg daily.  Pt to start walking program with goal of at least 30 minutes a day/5 days a week. Check BLE arterial ultrasound prior to next visit.   Paroxysmal Afib- Check 30 day event monitor to quantify Afib burden.  Continue Eliquis 5 mg bid for anticoagulation.  HLD- Continue ASA 81 mg daily, Eliqus 5 mg bid, and atorvastatin 40 mg daily.  Overweight- Encourage diet, exercise, and weight loss.   Smoking- Encourage smoking cessation.     HPI:  Mr. Zamorano reports significant improvement  in leg pain when walking.  States he can now walk several blocks before getting the pain.  He has no rest pain or tissue loss.  Pt with irregular rhythm on exam (back in Afib).  Event monitor results are pending.  BLE Arterial Ultrasound on 2/1/2024 revealed right resting WENDI 0.65, is suggestive of moderate right lower extremity arterial disease. There is a short-segment occlusion at the mid SFA, followed by high-grade stenosis in the distal segment. The right anterior tibial artery is occluded in the mid segment.  Left resting WENDI 0.70, is suggestive of moderate left lower extremity arterial disease.  The left SFA is occluded in the proximal/mid segment, with distal reconstitution.  The left anterior tibial artery is occluded in the mid segment.    Past Medical History:   Diagnosis Date    Anxiety 7/29/2014    Arthritis     Bilateral diabetic retinopathy 2017    CKD (chronic kidney disease) stage 2, GFR 60-89 ml/min 12/28/2016    CKD (chronic kidney disease) stage 4, GFR 15-29 ml/min 7/29/2014    Colon polyps 2014    Depression - situational 7/29/2014    Diabetes mellitus     Diabetes type 2 since 2000 7/29/2014    Diabetic neuropathy 7/29/2014    History of cardioversion 10/3/2019    History of hepatitis C, s/p successful Rx w/ SVR24 - 9/2017 7/29/2014    Genotype 1a, treatment naive 10/2014 liver biopsy - grade 1 / stage 1 Completed Harvoni w/ SVR    Hyperlipidemia 7/29/2014    Hypertension     Neuropathy     Organ transplant candidate 7/29/2014    Pancreatitis     S/P cadaveric kidney transplant 11/5/2016. ESRD secondary to HTN/DMII 11/5/2016    Stage 3a chronic kidney disease 12/28/2016    Type 2 diabetes mellitus with stage 3a chronic kidney disease, with long-term current use of insulin 7/29/2014     Past Surgical History:   Procedure Laterality Date    APPENDECTOMY      CATARACT EXTRACTION W/  INTRAOCULAR LENS IMPLANT Right 3/13/2024    Procedure: EXTRACTION, CATARACT, WITH IOL INSERTION;  Surgeon: Pia  Jennifer YE MD;  Location: [facility] OR;  Service: Ophthalmology;  Laterality: Right;    CATARACT EXTRACTION W/  INTRAOCULAR LENS IMPLANT Left 4/10/2024    Procedure: EXTRACTION, CATARACT, WITH IOL INSERTION;  Surgeon: Jennifer Palacio MD;  Location: [facility] OR;  Service: Ophthalmology;  Laterality: Left;    COLONOSCOPY N/A 2020    Procedure: COLONOSCOPY;  Surgeon: Tico Bell MD;  Location: [facility] ENDO ([facility]);  Service: Endoscopy;  Laterality: N/A;  ok to hold eliquis per Dr. Valadez, see telephone encounter 2020-MS  covid test  [place]    KIDNEY TRANSPLANT      TRANSESOPHAGEAL ECHOCARDIOGRAPHY N/A 2019    Procedure: ECHOCARDIOGRAM, TRANSESOPHAGEAL;  Surgeon: Dolores Diagnostic Provider;  Location: [facility] EP LAB;  Service: Cardiology;  Laterality: N/A;    TREATMENT OF CARDIAC ARRHYTHMIA N/A 2019    Procedure: CARDIOVERSION;  Surgeon: Bronson Bowden MD;  Location: [facility] EP LAB;  Service: Cardiology;  Laterality: N/A;  AF, FELICIANO, DCCV, MAC, GP, 3 PREP     Social History     Socioeconomic History    Marital status:    Occupational History     Employer: [employer]   Tobacco Use    Smoking status: Former     Current packs/day: 0.00     Average packs/day: 0.5 packs/day for 32.0 years (16.0 ttl pk-yrs)     Types: Cigarettes     Start date: 1984     Quit date: 2016     Years since quittin.3    Smokeless tobacco: Never    Tobacco comments:     Pt reports that he quit in , but started up again in . pt reports he is currently working on quitting again   Substance and Sexual Activity    Alcohol use: Yes     Comment: Pt reports occasional beers on Sundays. Pt reports drinking daily prior to ESRD diagnosis.    Drug use: No    Sexual activity: Yes     Partners: Female   Social History Narrative     for 14 years     for 29 years    2 children ages 35, 36     Social Determinants of Health     Financial Resource Strain: Low Risk  (2024)    Overall  Financial Resource Strain (CARDIA)     Difficulty of Paying Living Expenses: Not very hard   Food Insecurity: No Food Insecurity (4/2/2024)    Hunger Vital Sign     Worried About Running Out of Food in the Last Year: Never true     Ran Out of Food in the Last Year: Never true   Transportation Needs: No Transportation Needs (4/2/2024)    PRAPARE - Transportation     Lack of Transportation (Medical): No     Lack of Transportation (Non-Medical): No   Physical Activity: Inactive (4/2/2024)    Exercise Vital Sign     Days of Exercise per Week: 0 days     Minutes of Exercise per Session: 0 min   Stress: No Stress Concern Present (4/2/2024)    [employer] [employer] - Occupational Stress Questionnaire     Feeling of Stress : Not at all   Social Connections: Moderately Isolated (4/2/2024)    Social Connection and Isolation Panel [NHANES]     Frequency of Communication with Friends and Family: More than three times a week     Frequency of Social Gatherings with Friends and Family: Once a week     Attends Jain Services: Never     Active Member of Clubs or Organizations: No     Attends Club or Organization Meetings: Never     Marital Status:    Housing Stability: Low Risk  (4/2/2024)    Housing Stability Vital Sign     Unable to Pay for Housing in the Last Year: No     Number of Places Lived in the Last Year: 1     Unstable Housing in the Last Year: No     Family History   Problem Relation Name Age of Onset    Diabetes Mother      Hypertension Mother      Heart disease Mother          CAD with PCI    Heart disease Father      Cancer Brother 2         one sister with breast cancer    Hypertension Brother 2         one sister with HTN and borderline DM    Stroke Neg Hx      Kidney disease Neg Hx         Review of patient's allergies indicates:   Allergen Reactions    Nifedipine Other (See Comments)     Gingival hyperplasia       Medication List with Changes/Refills   Current Medications    APIXABAN  "(ELIQUIS) 5 MG TAB    Take 1 tablet (5 mg total) by mouth 2 (two) times daily.    ASPIRIN 81 MG CHEW    Take 81 mg by mouth once daily.    ATORVASTATIN (LIPITOR) 40 MG TABLET    Take 1 tablet (40 mg total) by mouth once daily.    BLOOD-GLUCOSE SENSOR SHIKHA    Gin 3, use as directed, change every 14 days , e 11.65    CARVEDILOL (COREG) 25 MG TABLET    Take 1 tablet (25 mg total) by mouth 2 (two) times daily.    EMPAGLIFLOZIN (JARDIANCE) 10 MG TABLET    Take 1 tablet (10 mg total) by mouth once daily.    FAMOTIDINE (PEPCID) 20 MG TABLET    Take 1 tablet (20 mg total) by mouth every evening.    GABAPENTIN (NEURONTIN) 300 MG CAPSULE    Take 1 capsule by mouth in the morning, 1 capsule midday, and 3 capsules at night.    HYDROCHLOROTHIAZIDE (HYDRODIURIL) 12.5 MG TAB    Take 1 tablet (12.5 mg total) by mouth daily as needed (swelling).    INSULIN (LANTUS SOLOSTAR U-100 INSULIN) GLARGINE 100 UNITS/ML SUBQ PEN    Inject 20 Units into the skin every morning.    INSULIN ASPART U-100 (NOVOLOG) 100 UNIT/ML (3 ML) INPN PEN    Inject 16 units w/ breakfast, 10 units at lunch and dinner scale 180-230+2, 231-280+4, 281-330+6, 331-380+8, >380+10.  *Max daily 66 units.*    MECLIZINE (ANTIVERT) 25 MG TABLET    Take 1 tablet (25 mg total) by mouth 3 (three) times daily as needed for Dizziness.    MYCOPHENOLATE (CELLCEPT) 250 MG CAP    Take 4 capsules (1,000 mg total) by mouth 2 (two) times daily. HOLD cellcept until Monday (4/8)    PANTOPRAZOLE (PROTONIX) 40 MG TABLET    Take 1 tablet (40 mg total) by mouth 2 (two) times a day. for 14 days    PEN NEEDLE, DIABETIC (BD ULTRA-FINE LO PEN NEEDLE) 32 GAUGE X 5/32" NDLE    USE TO ADMINISTER INSULIN 4 TIMES DAILY.    PEN NEEDLE, DIABETIC (BD ULTRA-FINE LO PEN NEEDLE) 32 GAUGE X 5/32" NDLE    use four times a day    PREDNISONE (DELTASONE) 5 MG TABLET    Take 1 tablet (5 mg total) by mouth once daily.    TACROLIMUS (PROGRAF) 0.5 MG CAP    Take 4 capsules (2 mg total) by mouth every morning " "AND 2 capsules (1 mg total) every evening.    VALSARTAN (DIOVAN) 160 MG TABLET    Take 1 tablet (160 mg total) by mouth once daily.       Review of Systems  Constitution: Denies chills, fever, and sweats.  HENT: Denies headaches or blurry vision.  Cardiovascular: Denies chest pain or irregular heart beat.  Respiratory: Positive for shortness of breath after walking half a block.   Gastrointestinal: Denies abdominal pain, nausea, or vomiting.  Musculoskeletal: Positive for pain in legs when walking.   Neurological: Denies dizziness or focal weakness.  Psychiatric/Behavioral: Normal mental status.  Hematologic/Lymphatic: Denies bleeding problem or easy bruising/bleeding.  Skin: Denies rash or suspicious lesions    Physical Examination  BP (!) 147/85   Pulse 67   Ht 6' 1" (1.854 m)   Wt 94 kg (207 lb 3.7 oz)   BMI 27.34 kg/m²     Constitutional: No acute distress, conversant  HEENT: Sclera anicteric, Pupils equal, round and reactive to light, extraocular motions intact, Oropharynx clear  Neck: No JVD, no carotid bruits  Cardiovascular: regular rate and rhythm, no murmur, rubs or gallops, normal S1/S2  Pulmonary: Clear to auscultation bilaterally  Abdominal: Abdomen soft, nontender, nondistended, positive bowel sounds  Extremities: BLE with no edema or tissue loss.    Pulses:  Carotid pulses are 2+ on the right side, and 2+ on the left side.  Radial pulses are 2+ on the right side, and 2+ on the left side.   Femoral pulses are 2+ on the right side, and 2+ on the left side.  Popliteal pulses are 2+ on the right side, and 2+ on the left side.   Dorsalis pedis pulses are 2+ on the right side, and 2+ on the left side.   Posterior tibial pulses are 2+ on the right side, and 2+ on the left side.    Skin: No ecchymosis, erythema, or ulcers  Psych: Alert and oriented x 3, appropriate affect  Neuro: CNII-XII intact, no focal deficits    Labs:  Most Recent Data  CBC:   Lab Results   Component Value Date    WBC 7.33 04/08/2024 "    HGB 8.2 (L) 04/08/2024    HCT 28.7 (L) 04/08/2024     04/08/2024    MCV 86 04/08/2024    RDW 16.9 (H) 04/08/2024     BMP:   Lab Results   Component Value Date     (L) 04/08/2024    K 3.6 04/08/2024     04/08/2024    CO2 19 (L) 04/08/2024    BUN 18 04/08/2024    CREATININE 1.9 (H) 04/08/2024     (H) 04/08/2024    CALCIUM 8.6 (L) 04/08/2024    MG 1.7 04/03/2024    PHOS 2.7 04/08/2024     LFTS;   Lab Results   Component Value Date    PROT 5.7 (L) 04/03/2024    ALBUMIN 2.9 (L) 04/08/2024    BILITOT 0.6 04/03/2024    AST 10 04/03/2024    ALKPHOS 48 (L) 04/03/2024    ALT <5 (L) 04/03/2024     COAGS:   Lab Results   Component Value Date    INR 1.1 04/01/2024     FLP:   Lab Results   Component Value Date    CHOL 131 06/07/2023    HDL 31 (L) 06/07/2023    LDLCALC 61.4 (L) 06/07/2023    TRIG 193 (H) 06/07/2023    CHOLHDL 23.7 06/07/2023     CARDIAC:   Lab Results   Component Value Date    TROPONINI 0.090 (H) 07/11/2022    BNP 3,087 (H) 07/10/2022       Imaging:    BLE Arterial Ultrasound 2/1/2024:    Right resting WENDI 0.65, is suggestive of moderate right lower extremity arterial disease.    There is a short-segment occlusion at the mid SFA, followed by high-grade stenosis in the distal segment.    The right anterior tibial artery is occluded in the mid segment.    Left resting WENDI 0.70, is suggestive of moderate left lower extremity arterial disease.    The left SFA is occluded in the proximal/mid segment, with distal reconstitution.    The left anterior tibial artery is occluded in the mid segment.    WENDI Study 2/9/2023:   Right WENDI 0.79  Left WENDI 0.77  Right Leg: Segmental pressures and PVR waveforms suggest multi-level mild to moderate peripheral arterial occlusive disease.   Left Leg: Segmental pressures and PVR waveforms suggest multi-level mild to moderate peripheral arterial occlusive disease.   Findings consistent with aorto-iliac disease.   Rt lower digits: Toe PPG waveforms as described  above. - TBI of 0.72 with a Great toe pressure of 139 mmHg is noted.   Lt lower digits: Toe PPG waveforms as described above. - TBI of 0.78 with a Great toe pressure of 149 mmHg is noted.     Echo 2022:  The left ventricle is normal in size with eccentric hypertrophy and normal systolic function. The estimated ejection fraction is 65%.  Normal right ventricular size with normal right ventricular systolic function.  Left ventricular diastolic dysfunction.  Biatrial enlargement.  Mild aortic regurgitation.  PA systolic pressure is at elast 39 mmHg. The IVC is not visualized.    Nuclear Stress Testing 5/15/2023    Normal myocardial perfusion scan. There is no evidence of myocardial ischemia or infarction.    The visually estimated ejection fraction is normal at rest and normal during stress.    There is normal wall motion at rest and post stress.    LV cavity size is normal at rest and normal at stress.    The ECG portion of the study is abnormal but not diagnostic due to resting ST-T abnormalities.    The patient reported no chest pain during the stress test.    There were no arrhythmias during stress.    There are no prior studies for comparison.    Assessment/Plan:  Ravi Zamorano is a 68 y.o. male with pAF (on Eliquis), HTN, HLD, DM2, hx of ESRD previously on HD status post  donor kidney transplant, smoking, who presetns for a follow up appointment.    PAD- Mr. Zamorano presents with [place] stage 3 claudication symptoms.  Symptoms have improved with starting walking program of at least 30 minutes a day/5 days a week.  No rest pain or tissue loss.  Will hold off of starting cilostazol due to potential interaction with flecainide for QT interval prolongation.  Continue ASA 81 mg daily, Eliqus 5 mg bid, and atorvastatin 40 mg daily.  Pt to start walking program with goal of at least 30 minutes a day/5 days a week. Check BLE arterial ultrasound prior to next visit.     2. Paroxysmal Afib- Pt with  irregular rhythm on exam with rates in the 60's (back in Afib).  Event monitor results are pending.  Pt to follow up with EP (Dr. Bowden) regarding Afib ablation.  Continue carvedilol 25 mg bid for rate control, and Eliquis 5 mg bid for anticoagulation.    3. HLD- Continue ASA 81 mg daily, Eliqus 5 mg bid, and atorvastatin 40 mg daily.    4. Overweight- Encourage diet, exercise, and weight loss.     5. Smoking- Encourage smoking cessation.     Follow up in 6 months    Total duration of face to face visit time 30 minutes.  Total time spent counseling greater than fifty percent of total visit time.  Counseling included discussion regarding imaging findings, diagnosis, possibilities, treatment options, risks and benefits.  The patient had many questions regarding the options and long-term effects.    Darwin Gottlieb MD, PhD  Interventional Cardiology

## 2024-04-18 NOTE — PATIENT INSTRUCTIONS
Assessment/Plan:  Ravi Zamorano is a 68 y.o. male with pAF (on Eliquis), HTN, HLD, DM2, hx of ESRD previously on HD status post  donor kidney transplant, smoking, who presetns for a follow up appointment.    PAD- Mr. Zamorano presents with [place] stage 3 claudication symptoms.  Symptoms have improved with starting walking program of at least 30 minutes a day/5 days a week.  No rest pain or tissue loss.  Will hold off of starting cilostazol due to potential interaction with flecainide for QT interval prolongation.  Continue ASA 81 mg daily, Eliqus 5 mg bid, and atorvastatin 40 mg daily.  Pt to start walking program with goal of at least 30 minutes a day/5 days a week. Check BLE arterial ultrasound prior to next visit.     2. Paroxysmal Afib- Pt with irregular rhythm on exam with rates in the 60's (back in Afib).  Event monitor results are pending.  Pt to follow up with EP (Dr. Bowden) regarding Afib ablation.  Continue carvedilol 25 mg bid for rate control, and Eliquis 5 mg bid for anticoagulation.    3. HLD- Continue ASA 81 mg daily, Eliqus 5 mg bid, and atorvastatin 40 mg daily.    4. Overweight- Encourage diet, exercise, and weight loss.     5. Smoking- Encourage smoking cessation.     Follow up in 6 months

## 2024-04-19 DIAGNOSIS — I48.19 PERSISTENT ATRIAL FIBRILLATION: Primary | ICD-10-CM

## 2024-04-19 NOTE — PROGRESS NOTES
HPI    Ref: Dr. Christensen     S/p: Phacoemulsification with placement of intraocular lens, left eye.   04/10/2024  S/p phaco OD 03/13/24   Hyperopia with regular astigmatism and presbyopia, bilateral   Type 2 diabetes mellitus with stage 3a chronic kidney disease, with   long-term current use of insulin   Type 2 diabetes mellitus with both eyes affec delfino by moderate nonprolife   rative retinopathy without macular edema, without long-term current use of   insulin   Conjunctivitis, allergic, bilateral   Dry eye syndrome, bilateral   Optic nerve cupping of both eyes   Cortical cataract of both eyes     PGB TID OS    Patient here for 1 day post op OS  Pt. States vision still slightly blurry but is seeing better.  Pt. Denies pain or discomfort.  Last edited by Juana Schmitz on 4/11/2024  3:14 PM.            Assessment /Plan     For exam results, see Encounter Report.    Status post cataract extraction and insertion of intraocular lens, left      Slit Lamp Exam  L/L - normal  C/s - quiet  Cornea - clear  A/C - 1+ cell  Lens - PCIOL    POD #1 s/p phaco/IOL  - doing well  - continue the following drops:    vigamox or ocuflox TID x 1 wk then stop  Pred forte TID x  4 wks  Ketorolac TID until runs out    Versus:    Combination drop - 1 drop TID x total of 1 month    Appropriate precautions and post op medications reviewed.  Patient instructed to call or come in if symptoms of redness, decreased vision, or pain are experienced.    -f/up 1-2 wks, sooner PRN. Or 4 wks with optom for mrx if needed.

## 2024-04-22 ENCOUNTER — TELEPHONE (OUTPATIENT)
Dept: INTERNAL MEDICINE | Facility: CLINIC | Age: 69
End: 2024-04-22
Payer: MEDICARE

## 2024-04-22 ENCOUNTER — LAB VISIT (OUTPATIENT)
Dept: LAB | Facility: HOSPITAL | Age: 69
End: 2024-04-22
Payer: MEDICARE

## 2024-04-22 DIAGNOSIS — Z94.0 KIDNEY REPLACED BY TRANSPLANT: ICD-10-CM

## 2024-04-22 DIAGNOSIS — I10 PRIMARY HYPERTENSION: Primary | ICD-10-CM

## 2024-04-22 LAB — TACROLIMUS BLD-MCNC: 12.1 NG/ML (ref 5–15)

## 2024-04-22 PROCEDURE — 80197 ASSAY OF TACROLIMUS: CPT | Performed by: INTERNAL MEDICINE

## 2024-04-22 PROCEDURE — 36415 COLL VENOUS BLD VENIPUNCTURE: CPT | Performed by: INTERNAL MEDICINE

## 2024-04-22 RX ORDER — TACROLIMUS 0.5 MG/1
1 CAPSULE ORAL EVERY 12 HOURS
Qty: 120 CAPSULE | Refills: 11 | Status: SHIPPED | OUTPATIENT
Start: 2024-04-22 | End: 2024-06-05

## 2024-04-22 NOTE — TELEPHONE ENCOUNTER
Written order received from Dr. Sosa.  Spoke to pt and instructed to lower Prograf to 1 mg twice a day (2 capsules in the AM and 2 capsules in the PM).  Patient verified that he has the 0.5 mg strength capsules.  Patient will repeat a level on 5/7/24.  Pt verbalized understanding.       ----- Message from Bola Sosa MD sent at 4/22/2024 12:50 PM CDT -----  If this level is a true trough and reliable please lower prograf to 1 mg PO bid and repeat a tacro level in 2 weeks

## 2024-04-22 NOTE — TELEPHONE ENCOUNTER
----- Message from Shirley Carrera MA sent at 4/22/2024  1:39 PM CDT -----  Contact: Pt  [phone]    ----- Message -----  From: Jean Pinon  Sent: 4/22/2024   1:34 PM CDT  To: Laura MAYS Staff    Medical advice    He said he's been taking the hydroCHLOROthiazide (HYDRODIURIL) 12.5 MG Tab for about 5 days and his legs, ankles, and hands are still swollen so, he want's to know if he can double the dose.    Thank you

## 2024-04-22 NOTE — PROGRESS NOTES
If this level is a true trough and reliable please lower prograf to 1 mg PO bid and repeat a tacro level in 2 weeks

## 2024-04-23 RX ORDER — HYDROCHLOROTHIAZIDE 25 MG/1
25 TABLET ORAL DAILY
Qty: 30 TABLET | Refills: 11 | Status: ON HOLD | OUTPATIENT
Start: 2024-04-23 | End: 2024-05-03 | Stop reason: HOSPADM

## 2024-04-25 ENCOUNTER — TELEPHONE (OUTPATIENT)
Dept: INTERNAL MEDICINE | Facility: CLINIC | Age: 69
End: 2024-04-25
Payer: MEDICARE

## 2024-04-25 NOTE — TELEPHONE ENCOUNTER
----- Message from Wilson Markham MA sent at 4/25/2024  2:15 PM CDT -----  Contact: [email or web address]@ [phone]  Pt called                Pt is requesting a call back to speak with provider for medical advice with a concern of both legs, ankles feet have been swelling for a week now.

## 2024-04-26 ENCOUNTER — LAB VISIT (OUTPATIENT)
Dept: LAB | Facility: HOSPITAL | Age: 69
End: 2024-04-26
Payer: MEDICARE

## 2024-04-26 ENCOUNTER — OFFICE VISIT (OUTPATIENT)
Dept: INTERNAL MEDICINE | Facility: CLINIC | Age: 69
End: 2024-04-26
Payer: MEDICARE

## 2024-04-26 VITALS
WEIGHT: 209.88 LBS | OXYGEN SATURATION: 94 % | SYSTOLIC BLOOD PRESSURE: 150 MMHG | DIASTOLIC BLOOD PRESSURE: 78 MMHG | HEART RATE: 80 BPM | HEIGHT: 73 IN | BODY MASS INDEX: 27.82 KG/M2

## 2024-04-26 DIAGNOSIS — I73.9 PAD (PERIPHERAL ARTERY DISEASE): Primary | ICD-10-CM

## 2024-04-26 DIAGNOSIS — N18.32 STAGE 3B CHRONIC KIDNEY DISEASE: ICD-10-CM

## 2024-04-26 DIAGNOSIS — I50.32 CHRONIC DIASTOLIC CONGESTIVE HEART FAILURE: ICD-10-CM

## 2024-04-26 DIAGNOSIS — I73.9 PAD (PERIPHERAL ARTERY DISEASE): ICD-10-CM

## 2024-04-26 LAB
ALBUMIN SERPL BCP-MCNC: 3.1 G/DL (ref 3.5–5.2)
ALP SERPL-CCNC: 52 U/L (ref 55–135)
ALT SERPL W/O P-5'-P-CCNC: <5 U/L (ref 10–44)
ANION GAP SERPL CALC-SCNC: 7 MMOL/L (ref 8–16)
AST SERPL-CCNC: 13 U/L (ref 10–40)
BILIRUB SERPL-MCNC: 0.5 MG/DL (ref 0.1–1)
BNP SERPL-MCNC: 4528 PG/ML (ref 0–99)
BUN SERPL-MCNC: 21 MG/DL (ref 8–23)
CALCIUM SERPL-MCNC: 9.7 MG/DL (ref 8.7–10.5)
CHLORIDE SERPL-SCNC: 101 MMOL/L (ref 95–110)
CO2 SERPL-SCNC: 25 MMOL/L (ref 23–29)
CREAT SERPL-MCNC: 1.5 MG/DL (ref 0.5–1.4)
EST. GFR  (NO RACE VARIABLE): 51 ML/MIN/1.73 M^2
GLUCOSE SERPL-MCNC: 54 MG/DL (ref 70–110)
POTASSIUM SERPL-SCNC: 3.6 MMOL/L (ref 3.5–5.1)
PROT SERPL-MCNC: 5.9 G/DL (ref 6–8.4)
SODIUM SERPL-SCNC: 133 MMOL/L (ref 136–145)

## 2024-04-26 PROCEDURE — 3052F HG A1C>EQUAL 8.0%<EQUAL 9.0%: CPT | Mod: CPTII,S$GLB,, | Performed by: NURSE PRACTITIONER

## 2024-04-26 PROCEDURE — 1101F PT FALLS ASSESS-DOCD LE1/YR: CPT | Mod: CPTII,S$GLB,, | Performed by: NURSE PRACTITIONER

## 2024-04-26 PROCEDURE — 3077F SYST BP >= 140 MM HG: CPT | Mod: CPTII,S$GLB,, | Performed by: NURSE PRACTITIONER

## 2024-04-26 PROCEDURE — 3288F FALL RISK ASSESSMENT DOCD: CPT | Mod: CPTII,S$GLB,, | Performed by: NURSE PRACTITIONER

## 2024-04-26 PROCEDURE — 99214 OFFICE O/P EST MOD 30 MIN: CPT | Mod: S$GLB,,, | Performed by: NURSE PRACTITIONER

## 2024-04-26 PROCEDURE — 3008F BODY MASS INDEX DOCD: CPT | Mod: CPTII,S$GLB,, | Performed by: NURSE PRACTITIONER

## 2024-04-26 PROCEDURE — 1126F AMNT PAIN NOTED NONE PRSNT: CPT | Mod: CPTII,S$GLB,, | Performed by: NURSE PRACTITIONER

## 2024-04-26 PROCEDURE — 4010F ACE/ARB THERAPY RXD/TAKEN: CPT | Mod: CPTII,S$GLB,, | Performed by: NURSE PRACTITIONER

## 2024-04-26 PROCEDURE — 99999 PR PBB SHADOW E&M-EST. PATIENT-LVL V: CPT | Mod: PBBFAC,,, | Performed by: NURSE PRACTITIONER

## 2024-04-26 PROCEDURE — 80053 COMPREHEN METABOLIC PANEL: CPT | Performed by: NURSE PRACTITIONER

## 2024-04-26 PROCEDURE — 83880 ASSAY OF NATRIURETIC PEPTIDE: CPT | Performed by: NURSE PRACTITIONER

## 2024-04-26 PROCEDURE — 1159F MED LIST DOCD IN RCRD: CPT | Mod: CPTII,S$GLB,, | Performed by: NURSE PRACTITIONER

## 2024-04-26 PROCEDURE — 36415 COLL VENOUS BLD VENIPUNCTURE: CPT | Performed by: NURSE PRACTITIONER

## 2024-04-26 PROCEDURE — 1160F RVW MEDS BY RX/DR IN RCRD: CPT | Mod: CPTII,S$GLB,, | Performed by: NURSE PRACTITIONER

## 2024-04-26 PROCEDURE — 3078F DIAST BP <80 MM HG: CPT | Mod: CPTII,S$GLB,, | Performed by: NURSE PRACTITIONER

## 2024-04-26 PROCEDURE — 1111F DSCHRG MED/CURRENT MED MERGE: CPT | Mod: CPTII,S$GLB,, | Performed by: NURSE PRACTITIONER

## 2024-04-26 NOTE — PROGRESS NOTES
"Subjective     Patient ID: Ravi Zamorano is a 68 y.o. male.  BP (!) 150/78 (BP Location: Left arm, Patient Position: Sitting)   Pulse 80   Ht 6' 1" (1.854 m)   Wt 95.2 kg (209 lb 14.1 oz)   SpO2 (!) 94%   BMI 27.69 kg/m²      Chief Complaint: Joint Swelling    Ravi Zamorano is a 68 y.o. male with pAF (on Eliquis), HTN, HLD, DM2, hx of ESRD previously on HD status post  donor kidney transplant, former smoking, and PAD. He presents to clinic today for swelling in bilateral ankles x 1 week. HCTZ recently increased to 25mg daily for swelling. Patient instructed to follow-up if no improvement in swelling. He is followed By Dr Darwin Gottlieb for treatment of PAD. Was instructed to begin waling program to help with PAD and swelling, but patient has not started yet. Previously on aldactone, but d/c'd due to lightheadedness and dizziness. Reports SOB/DOSS when in a-fib.          Patient Active Problem List   Diagnosis    Type 2 diabetes mellitus with stage 3a chronic kidney disease, with long-term current use of insulin    Diabetic polyneuropathy associated with type 2 diabetes mellitus    Hypertension since     Hyperlipidemia    Depression - situational    Anxiety    Prophylactic immunotherapy    Chronic diastolic congestive heart failure    S/P cadaveric kidney transplant 2016. ESRD secondary to HTN/DMII    Adverse effect of glucocorticoid or synthetic analogue    Adverse effect of immunosuppressive drug    Immunocompromised state due to drug therapy    Pancytopenia    Stage 3 chronic kidney disease    Type 2 diabetes mellitus with both eyes affected by moderate nonproliferative retinopathy without macular edema, without long-term current use of insulin    Persistent atrial fibrillation    Anticoagulant long-term use    Encounter for monitoring flecainide therapy    Peyronie's disease    Erectile dysfunction due to arterial insufficiency    BPH with urinary obstruction    History of colon " polyps    COVID-19 virus infection    PAD (peripheral artery disease)    Claudication of both lower extremities    Paroxysmal A-fib    Immunodeficiency, unspecified    Thrombocytopenia, unspecified    H. pylori infection    Symptomatic anemia    Kidney replaced by transplant        Current Outpatient Medications   Medication Sig Dispense Refill    apixaban (ELIQUIS) 5 mg Tab Take 1 tablet (5 mg total) by mouth 2 (two) times daily. 60 tablet 0    aspirin 81 MG Chew Take 81 mg by mouth once daily.      atorvastatin (LIPITOR) 40 MG tablet Take 1 tablet (40 mg total) by mouth once daily. 90 tablet 3    blood-glucose sensor Kayla Gin 3, use as directed, change every 14 days , e 11.65 2 each 11    carvediloL (COREG) 25 MG tablet Take 1 tablet (25 mg total) by mouth 2 (two) times daily. 180 tablet 3    empagliflozin (JARDIANCE) 10 mg tablet Take 1 tablet (10 mg total) by mouth once daily. 30 tablet 11    famotidine (PEPCID) 20 MG tablet Take 1 tablet (20 mg total) by mouth every evening. 90 tablet 3    gabapentin (NEURONTIN) 300 MG capsule Take 1 capsule by mouth in the morning, 1 capsule midday, and 3 capsules at night. 450 capsule 3    hydroCHLOROthiazide (HYDRODIURIL) 25 MG tablet Take 1 tablet (25 mg total) by mouth once daily as needed for swelling 30 tablet 11    insulin (LANTUS SOLOSTAR U-100 INSULIN) glargine 100 units/mL SubQ pen Inject 20 Units into the skin every morning. 15 mL 6    insulin aspart U-100 (NOVOLOG) 100 unit/mL (3 mL) InPn pen Inject 16 units w/ breakfast, 10 units at lunch and dinner scale 180-230+2, 231-280+4, 281-330+6, 331-380+8, >380+10.  *Max daily 66 units.* 30 mL 6    meclizine (ANTIVERT) 25 mg tablet Take 1 tablet (25 mg total) by mouth 3 (three) times daily as needed for Dizziness. 21 tablet 3    mycophenolate (CELLCEPT) 250 mg Cap Take 4 capsules (1,000 mg total) by mouth 2 (two) times daily. HOLD cellcept until Monday (4/8) 240 capsule 11    pen needle, diabetic (BD ULTRA-FINE LO PEN  "NEEDLE) 32 gauge x 5/32" Ndle USE TO ADMINISTER INSULIN 4 TIMES DAILY. 400 each 3    pen needle, diabetic (BD ULTRA-FINE LO PEN NEEDLE) 32 gauge x 5/32" Ndle use four times a day 100 each 11    predniSONE (DELTASONE) 5 MG tablet Take 1 tablet (5 mg total) by mouth once daily. 30 tablet 11    tacrolimus (PROGRAF) 0.5 MG Cap Take 2 capsules (1 mg total) by mouth every 12 (twelve) hours. 120 capsule 11    valsartan (DIOVAN) 160 MG tablet Take 1 tablet (160 mg total) by mouth once daily. 90 tablet 3    pantoprazole (PROTONIX) 40 MG tablet Take 1 tablet (40 mg total) by mouth 2 (two) times a day. for 14 days 28 tablet 0     No current facility-administered medications for this visit.     Facility-Administered Medications Ordered in Other Visits   Medication Dose Route Frequency Provider Last Rate Last Admin    balanced salt irrigation intra-ocular solution 1 drop  1 drop Right Eye On Call Procedure Jennifer Palacio MD        phenylephrine HCL 10% ophthalmic solution 1 drop  1 drop Right Eye PRN Jennifer Palacio MD        proparacaine 0.5 % ophthalmic solution 1 drop  1 drop Right Eye Daily PRN Jennifer Palacio MD        sodium chloride 0.9% flush 10 mL  10 mL Intravenous PRN Jennifer Palacio MD        TETRAcaine HCl (PF) 0.5 % Drop 1 drop  1 drop Right Eye PRN Jennifer Palacio MD            Review of Systems   Respiratory:  Positive for shortness of breath.    Cardiovascular:  Positive for leg swelling.   All other systems reviewed and are negative.         Objective     Physical Exam  Constitutional:       General: He is not in acute distress.     Appearance: Normal appearance. He is not ill-appearing, toxic-appearing or diaphoretic.   HENT:      Head: Normocephalic and atraumatic.      Nose: Nose normal.   Cardiovascular:      Rate and Rhythm: Normal rate. Rhythm irregular.   Pulmonary:      Effort: Pulmonary effort is normal.      Breath sounds: Normal breath sounds.   Abdominal:      General: Abdomen is " flat.   Musculoskeletal:         General: Normal range of motion.      Right lower leg: Edema (1+) present.      Left lower leg: Edema (1+) present.   Skin:     General: Skin is warm and dry.   Neurological:      General: No focal deficit present.      Mental Status: He is alert and oriented to person, place, and time.   Psychiatric:         Mood and Affect: Mood normal.         Behavior: Behavior normal.          Assessment and Plan     PAD (peripheral artery disease); continue current management and follow-up with Cardiology, reinforced importance of implementing walking program  -     COMPREHENSIVE METABOLIC PANEL; Future  -     B-TYPE NATRIURETIC PEPTIDE; Future  -     DASH diet   -     Follow-up with Cardiology to discuss potential changes in medications     Stage 3b chronic kidney disease; continue current management for now, will review labs and discuss any necessary changes in plan of care with patient   -     COMPREHENSIVE METABOLIC PANEL; Future  -     B-TYPE NATRIURETIC PEPTIDE; Future    Chronic diastolic congestive heart failure; continue current management for now, will review labs and discuss any necessary changes in plan of care with patient  -     COMPREHENSIVE METABOLIC PANEL; Future  -     B-TYPE NATRIURETIC PEPTIDE; Future        -     DASH diet         -     Follow-up with Cardiology to discuss potential changes in medications             Hiram Sal NP   Internal Medicine           Follow up if symptoms worsen or fail to improve.

## 2024-04-29 ENCOUNTER — HOSPITAL ENCOUNTER (OUTPATIENT)
Dept: CARDIOLOGY | Facility: CLINIC | Age: 69
Discharge: HOME | End: 2024-04-29
Payer: MEDICARE

## 2024-04-29 DIAGNOSIS — I48.19 PERSISTENT ATRIAL FIBRILLATION: ICD-10-CM

## 2024-04-29 LAB
OHS QRS DURATION: 102 MS
OHS QTC CALCULATION: 434 MS

## 2024-04-29 PROCEDURE — 93010 ELECTROCARDIOGRAM REPORT: CPT | Mod: S$GLB,,, | Performed by: INTERNAL MEDICINE

## 2024-04-29 PROCEDURE — 93005 ELECTROCARDIOGRAM TRACING: CPT | Mod: S$GLB,,, | Performed by: INTERNAL MEDICINE

## 2024-04-30 ENCOUNTER — TELEPHONE (OUTPATIENT)
Dept: INTERNAL MEDICINE | Facility: CLINIC | Age: 69
End: 2024-04-30
Payer: MEDICARE

## 2024-04-30 DIAGNOSIS — I50.32 CHRONIC DIASTOLIC CONGESTIVE HEART FAILURE: Primary | ICD-10-CM

## 2024-04-30 RX ORDER — SPIRONOLACTONE 25 MG/1
25 TABLET ORAL DAILY
Qty: 90 TABLET | Refills: 3 | Status: ON HOLD | OUTPATIENT
Start: 2024-04-30 | End: 2024-05-03

## 2024-04-30 NOTE — TELEPHONE ENCOUNTER
Restarting aldactone in setting of significantly elevated BNP and shortness of breath with heart failure. Suspect patient is too wet. Will help patient get back in with his cardiologist asap. Called patient to discuss plan of care and instructed him to go to ED if symptoms do not improve or worsen in the next 48 hours. Patient agreeable to plan.

## 2024-05-02 ENCOUNTER — HOSPITAL ENCOUNTER (OUTPATIENT)
Facility: HOSPITAL | Age: 69
Discharge: HOME | End: 2024-05-03
Attending: EMERGENCY MEDICINE | Admitting: STUDENT IN AN ORGANIZED HEALTH CARE EDUCATION/TRAINING PROGRAM
Payer: MEDICARE

## 2024-05-02 DIAGNOSIS — I50.9 CHF EXACERBATION: ICD-10-CM

## 2024-05-02 DIAGNOSIS — M79.89 LEG SWELLING: ICD-10-CM

## 2024-05-02 DIAGNOSIS — I50.32 CHRONIC DIASTOLIC CONGESTIVE HEART FAILURE: ICD-10-CM

## 2024-05-02 DIAGNOSIS — R07.9 CHEST PAIN: ICD-10-CM

## 2024-05-02 DIAGNOSIS — D64.9 ANEMIA, UNSPECIFIED TYPE: ICD-10-CM

## 2024-05-02 DIAGNOSIS — I50.9 ACUTE EXACERBATION OF CHF (CONGESTIVE HEART FAILURE): Primary | ICD-10-CM

## 2024-05-02 DIAGNOSIS — R06.02 SHORTNESS OF BREATH: ICD-10-CM

## 2024-05-02 LAB
ABO + RH BLD: NORMAL
ALBUMIN SERPL BCP-MCNC: 3 G/DL (ref 3.5–5.2)
ALP SERPL-CCNC: 58 U/L (ref 55–135)
ALT SERPL W/O P-5'-P-CCNC: <5 U/L (ref 10–44)
ANION GAP SERPL CALC-SCNC: 9 MMOL/L (ref 8–16)
AST SERPL-CCNC: 10 U/L (ref 10–40)
BASOPHILS # BLD AUTO: 0.03 K/UL (ref 0–0.2)
BASOPHILS NFR BLD: 0.5 % (ref 0–1.9)
BILIRUB SERPL-MCNC: 0.6 MG/DL (ref 0.1–1)
BLD GP AB SCN CELLS X3 SERPL QL: NORMAL
BNP SERPL-MCNC: >4900 PG/ML (ref 0–99)
BUN SERPL-MCNC: 22 MG/DL (ref 8–23)
CALCIUM SERPL-MCNC: 8.8 MG/DL (ref 8.7–10.5)
CHLORIDE SERPL-SCNC: 99 MMOL/L (ref 95–110)
CO2 SERPL-SCNC: 23 MMOL/L (ref 23–29)
CREAT SERPL-MCNC: 1.5 MG/DL (ref 0.5–1.4)
DIFFERENTIAL METHOD BLD: ABNORMAL
EOSINOPHIL # BLD AUTO: 0.1 K/UL (ref 0–0.5)
EOSINOPHIL NFR BLD: 1.4 % (ref 0–8)
ERYTHROCYTE [DISTWIDTH] IN BLOOD BY AUTOMATED COUNT: 17.1 % (ref 11.5–14.5)
EST. GFR  (NO RACE VARIABLE): 51 ML/MIN/1.73 M^2
ESTIMATED AVG GLUCOSE: 148 MG/DL (ref 68–131)
GLUCOSE SERPL-MCNC: 215 MG/DL (ref 70–110)
HBA1C MFR BLD: 6.8 % (ref 4–5.6)
HCT VFR BLD AUTO: 27.9 % (ref 40–54)
HGB BLD-MCNC: 7.9 G/DL (ref 14–18)
IMM GRANULOCYTES # BLD AUTO: 0.29 K/UL (ref 0–0.04)
IMM GRANULOCYTES NFR BLD AUTO: 4.5 % (ref 0–0.5)
LYMPHOCYTES # BLD AUTO: 0.4 K/UL (ref 1–4.8)
LYMPHOCYTES NFR BLD: 6.8 % (ref 18–48)
MAGNESIUM SERPL-MCNC: 1.4 MG/DL (ref 1.6–2.6)
MCH RBC QN AUTO: 24.3 PG (ref 27–31)
MCHC RBC AUTO-ENTMCNC: 28.3 G/DL (ref 32–36)
MCV RBC AUTO: 86 FL (ref 82–98)
MONOCYTES # BLD AUTO: 1.2 K/UL (ref 0.3–1)
MONOCYTES NFR BLD: 18.3 % (ref 4–15)
NEUTROPHILS # BLD AUTO: 4.5 K/UL (ref 1.8–7.7)
NEUTROPHILS NFR BLD: 68.5 % (ref 38–73)
NRBC BLD-RTO: 0 /100 WBC
OHS QRS DURATION: 100 MS
OHS QRS DURATION: 94 MS
OHS QTC CALCULATION: 437 MS
OHS QTC CALCULATION: 452 MS
PLATELET # BLD AUTO: 124 K/UL (ref 150–450)
PMV BLD AUTO: ABNORMAL FL (ref 9.2–12.9)
POCT GLUCOSE: 220 MG/DL (ref 70–110)
POCT GLUCOSE: 223 MG/DL (ref 70–110)
POCT GLUCOSE: 309 MG/DL (ref 70–110)
POTASSIUM SERPL-SCNC: 3.6 MMOL/L (ref 3.5–5.1)
PROT SERPL-MCNC: 5.8 G/DL (ref 6–8.4)
RBC # BLD AUTO: 3.25 M/UL (ref 4.6–6.2)
SODIUM SERPL-SCNC: 131 MMOL/L (ref 136–145)
SPECIMEN OUTDATE: NORMAL
TROPONIN I SERPL DL<=0.01 NG/ML-MCNC: 0.04 NG/ML (ref 0–0.03)
TROPONIN I SERPL DL<=0.01 NG/ML-MCNC: 0.05 NG/ML (ref 0–0.03)
WBC # BLD AUTO: 6.49 K/UL (ref 3.9–12.7)

## 2024-05-02 PROCEDURE — G0378 HOSPITAL OBSERVATION PER HR: HCPCS | Mod: HCNC

## 2024-05-02 PROCEDURE — 25000003 PHARM REV CODE 250: Mod: HCNC | Performed by: STUDENT IN AN ORGANIZED HEALTH CARE EDUCATION/TRAINING PROGRAM

## 2024-05-02 PROCEDURE — 83036 HEMOGLOBIN GLYCOSYLATED A1C: CPT | Mod: HCNC | Performed by: PHYSICIAN ASSISTANT

## 2024-05-02 PROCEDURE — 63600175 PHARM REV CODE 636 W HCPCS: Mod: HCNC | Performed by: PHYSICIAN ASSISTANT

## 2024-05-02 PROCEDURE — 96375 TX/PRO/DX INJ NEW DRUG ADDON: CPT | Mod: HCNC

## 2024-05-02 PROCEDURE — 84484 ASSAY OF TROPONIN QUANT: CPT | Mod: HCNC

## 2024-05-02 PROCEDURE — 86901 BLOOD TYPING SEROLOGIC RH(D): CPT | Mod: HCNC

## 2024-05-02 PROCEDURE — 25000003 PHARM REV CODE 250: Mod: HCNC | Performed by: PHYSICIAN ASSISTANT

## 2024-05-02 PROCEDURE — 83735 ASSAY OF MAGNESIUM: CPT | Mod: HCNC | Performed by: PHYSICIAN ASSISTANT

## 2024-05-02 PROCEDURE — 93010 ELECTROCARDIOGRAM REPORT: CPT | Mod: HCNC,,, | Performed by: INTERNAL MEDICINE

## 2024-05-02 PROCEDURE — 80053 COMPREHEN METABOLIC PANEL: CPT | Mod: HCNC

## 2024-05-02 PROCEDURE — 80197 ASSAY OF TACROLIMUS: CPT | Mod: HCNC | Performed by: PHYSICIAN ASSISTANT

## 2024-05-02 PROCEDURE — 93005 ELECTROCARDIOGRAM TRACING: CPT

## 2024-05-02 PROCEDURE — 63600175 PHARM REV CODE 636 W HCPCS

## 2024-05-02 PROCEDURE — 83921 ORGANIC ACID SINGLE QUANT: CPT | Mod: HCNC | Performed by: PHYSICIAN ASSISTANT

## 2024-05-02 PROCEDURE — 99285 EMERGENCY DEPT VISIT HI MDM: CPT | Mod: 25,HCNC

## 2024-05-02 PROCEDURE — 96376 TX/PRO/DX INJ SAME DRUG ADON: CPT | Mod: HCNC

## 2024-05-02 PROCEDURE — 96372 THER/PROPH/DIAG INJ SC/IM: CPT | Performed by: PHYSICIAN ASSISTANT

## 2024-05-02 PROCEDURE — 85025 COMPLETE CBC W/AUTO DIFF WBC: CPT | Mod: HCNC

## 2024-05-02 PROCEDURE — 96376 TX/PRO/DX INJ SAME DRUG ADON: CPT

## 2024-05-02 PROCEDURE — 83880 ASSAY OF NATRIURETIC PEPTIDE: CPT | Mod: HCNC

## 2024-05-02 RX ORDER — SODIUM CHLORIDE 0.9 % (FLUSH) 0.9 %
10 SYRINGE (ML) INJECTION
Status: DISCONTINUED | OUTPATIENT
Start: 2024-05-02 | End: 2024-05-03 | Stop reason: HOSPADM

## 2024-05-02 RX ORDER — ACETAMINOPHEN 325 MG/1
650 TABLET ORAL EVERY 4 HOURS PRN
Status: DISCONTINUED | OUTPATIENT
Start: 2024-05-02 | End: 2024-05-03 | Stop reason: HOSPADM

## 2024-05-02 RX ORDER — SIMETHICONE 80 MG
1 TABLET,CHEWABLE ORAL 4 TIMES DAILY PRN
Status: DISCONTINUED | OUTPATIENT
Start: 2024-05-02 | End: 2024-05-03 | Stop reason: HOSPADM

## 2024-05-02 RX ORDER — HYDROCODONE BITARTRATE AND ACETAMINOPHEN 5; 325 MG/1; MG/1
1 TABLET ORAL EVERY 6 HOURS PRN
Status: DISCONTINUED | OUTPATIENT
Start: 2024-05-02 | End: 2024-05-03 | Stop reason: HOSPADM

## 2024-05-02 RX ORDER — INSULIN ASPART 100 [IU]/ML
0-5 INJECTION, SOLUTION INTRAVENOUS; SUBCUTANEOUS
Status: DISCONTINUED | OUTPATIENT
Start: 2024-05-02 | End: 2024-05-03 | Stop reason: HOSPADM

## 2024-05-02 RX ORDER — ALUMINUM HYDROXIDE, MAGNESIUM HYDROXIDE, AND SIMETHICONE 1200; 120; 1200 MG/30ML; MG/30ML; MG/30ML
30 SUSPENSION ORAL 4 TIMES DAILY PRN
Status: DISCONTINUED | OUTPATIENT
Start: 2024-05-02 | End: 2024-05-03 | Stop reason: HOSPADM

## 2024-05-02 RX ORDER — SODIUM CHLORIDE 0.9 % (FLUSH) 0.9 %
5 SYRINGE (ML) INJECTION
Status: DISCONTINUED | OUTPATIENT
Start: 2024-05-02 | End: 2024-05-03 | Stop reason: HOSPADM

## 2024-05-02 RX ORDER — ONDANSETRON HYDROCHLORIDE 2 MG/ML
4 INJECTION, SOLUTION INTRAVENOUS EVERY 8 HOURS PRN
Status: DISCONTINUED | OUTPATIENT
Start: 2024-05-02 | End: 2024-05-03 | Stop reason: HOSPADM

## 2024-05-02 RX ORDER — TACROLIMUS 1 MG/1
1 CAPSULE ORAL 2 TIMES DAILY
Status: DISCONTINUED | OUTPATIENT
Start: 2024-05-02 | End: 2024-05-03 | Stop reason: HOSPADM

## 2024-05-02 RX ORDER — POLYETHYLENE GLYCOL 3350 17 G/17G
17 POWDER, FOR SOLUTION ORAL DAILY
Status: DISCONTINUED | OUTPATIENT
Start: 2024-05-02 | End: 2024-05-02

## 2024-05-02 RX ORDER — SPIRONOLACTONE 25 MG/1
25 TABLET ORAL DAILY
Status: DISCONTINUED | OUTPATIENT
Start: 2024-05-02 | End: 2024-05-03

## 2024-05-02 RX ORDER — IPRATROPIUM BROMIDE AND ALBUTEROL SULFATE 2.5; .5 MG/3ML; MG/3ML
3 SOLUTION RESPIRATORY (INHALATION) EVERY 4 HOURS PRN
Status: DISCONTINUED | OUTPATIENT
Start: 2024-05-02 | End: 2024-05-03 | Stop reason: HOSPADM

## 2024-05-02 RX ORDER — MYCOPHENOLATE MOFETIL 250 MG/1
1000 CAPSULE ORAL 2 TIMES DAILY
Status: DISCONTINUED | OUTPATIENT
Start: 2024-05-02 | End: 2024-05-03 | Stop reason: HOSPADM

## 2024-05-02 RX ORDER — IBUPROFEN 200 MG
24 TABLET ORAL
Status: DISCONTINUED | OUTPATIENT
Start: 2024-05-02 | End: 2024-05-03 | Stop reason: HOSPADM

## 2024-05-02 RX ORDER — IBUPROFEN 200 MG
16 TABLET ORAL
Status: DISCONTINUED | OUTPATIENT
Start: 2024-05-02 | End: 2024-05-03 | Stop reason: HOSPADM

## 2024-05-02 RX ORDER — HYDROCHLOROTHIAZIDE 25 MG/1
25 TABLET ORAL DAILY
Status: DISCONTINUED | OUTPATIENT
Start: 2024-05-02 | End: 2024-05-03

## 2024-05-02 RX ORDER — ATORVASTATIN CALCIUM 40 MG/1
40 TABLET, FILM COATED ORAL DAILY
Status: DISCONTINUED | OUTPATIENT
Start: 2024-05-02 | End: 2024-05-03 | Stop reason: HOSPADM

## 2024-05-02 RX ORDER — POLYETHYLENE GLYCOL 3350 17 G/17G
17 POWDER, FOR SOLUTION ORAL DAILY
Status: DISCONTINUED | OUTPATIENT
Start: 2024-05-02 | End: 2024-05-03 | Stop reason: HOSPADM

## 2024-05-02 RX ORDER — CARVEDILOL 25 MG/1
50 TABLET ORAL 2 TIMES DAILY
Status: DISCONTINUED | OUTPATIENT
Start: 2024-05-02 | End: 2024-05-03 | Stop reason: HOSPADM

## 2024-05-02 RX ORDER — PREDNISONE 5 MG/1
5 TABLET ORAL DAILY
Status: DISCONTINUED | OUTPATIENT
Start: 2024-05-02 | End: 2024-05-03 | Stop reason: HOSPADM

## 2024-05-02 RX ORDER — NAPROXEN SODIUM 220 MG/1
81 TABLET, FILM COATED ORAL DAILY
Status: DISCONTINUED | OUTPATIENT
Start: 2024-05-02 | End: 2024-05-03 | Stop reason: HOSPADM

## 2024-05-02 RX ORDER — FUROSEMIDE 10 MG/ML
40 INJECTION INTRAMUSCULAR; INTRAVENOUS
Status: COMPLETED | OUTPATIENT
Start: 2024-05-02 | End: 2024-05-02

## 2024-05-02 RX ORDER — TALC
8 POWDER (GRAM) TOPICAL NIGHTLY PRN
Status: CANCELLED | OUTPATIENT
Start: 2024-05-02

## 2024-05-02 RX ORDER — ONDANSETRON 4 MG/1
4 TABLET, ORALLY DISINTEGRATING ORAL EVERY 8 HOURS PRN
Status: DISCONTINUED | OUTPATIENT
Start: 2024-05-02 | End: 2024-05-03 | Stop reason: HOSPADM

## 2024-05-02 RX ORDER — VALSARTAN 160 MG/1
160 TABLET ORAL DAILY
Status: DISCONTINUED | OUTPATIENT
Start: 2024-05-02 | End: 2024-05-03

## 2024-05-02 RX ORDER — NALOXONE HCL 0.4 MG/ML
0.02 VIAL (ML) INJECTION
Status: DISCONTINUED | OUTPATIENT
Start: 2024-05-02 | End: 2024-05-03 | Stop reason: HOSPADM

## 2024-05-02 RX ORDER — HYDRALAZINE HYDROCHLORIDE 25 MG/1
25 TABLET, FILM COATED ORAL EVERY 8 HOURS PRN
Status: DISCONTINUED | OUTPATIENT
Start: 2024-05-02 | End: 2024-05-03 | Stop reason: HOSPADM

## 2024-05-02 RX ORDER — GABAPENTIN 300 MG/1
600 CAPSULE ORAL NIGHTLY
Status: DISCONTINUED | OUTPATIENT
Start: 2024-05-02 | End: 2024-05-03 | Stop reason: HOSPADM

## 2024-05-02 RX ORDER — BISACODYL 10 MG/1
10 SUPPOSITORY RECTAL DAILY PRN
Status: DISCONTINUED | OUTPATIENT
Start: 2024-05-02 | End: 2024-05-03 | Stop reason: HOSPADM

## 2024-05-02 RX ORDER — FUROSEMIDE 10 MG/ML
40 INJECTION INTRAMUSCULAR; INTRAVENOUS EVERY 12 HOURS
Status: DISCONTINUED | OUTPATIENT
Start: 2024-05-02 | End: 2024-05-03

## 2024-05-02 RX ORDER — GABAPENTIN 300 MG/1
300 CAPSULE ORAL 3 TIMES DAILY
Status: DISCONTINUED | OUTPATIENT
Start: 2024-05-02 | End: 2024-05-03 | Stop reason: HOSPADM

## 2024-05-02 RX ORDER — GLUCAGON 1 MG
1 KIT INJECTION
Status: DISCONTINUED | OUTPATIENT
Start: 2024-05-02 | End: 2024-05-03 | Stop reason: HOSPADM

## 2024-05-02 RX ORDER — ACETAMINOPHEN 325 MG/1
650 TABLET ORAL EVERY 8 HOURS PRN
Status: DISCONTINUED | OUTPATIENT
Start: 2024-05-02 | End: 2024-05-03 | Stop reason: HOSPADM

## 2024-05-02 RX ORDER — CARVEDILOL 12.5 MG/1
25 TABLET ORAL 2 TIMES DAILY
Status: DISCONTINUED | OUTPATIENT
Start: 2024-05-02 | End: 2024-05-02

## 2024-05-02 RX ADMIN — HYDRALAZINE HYDROCHLORIDE 25 MG: 25 TABLET, FILM COATED ORAL at 01:05

## 2024-05-02 RX ADMIN — TACROLIMUS 1 MG: 1 CAPSULE ORAL at 05:05

## 2024-05-02 RX ADMIN — GABAPENTIN 300 MG: 300 CAPSULE ORAL at 09:05

## 2024-05-02 RX ADMIN — FUROSEMIDE 40 MG: 10 INJECTION, SOLUTION INTRAVENOUS at 09:05

## 2024-05-02 RX ADMIN — FUROSEMIDE 40 MG: 10 INJECTION, SOLUTION INTRAVENOUS at 02:05

## 2024-05-02 RX ADMIN — MYCOPHENOLATE MOFETIL 1000 MG: 250 CAPSULE ORAL at 09:05

## 2024-05-02 RX ADMIN — GABAPENTIN 300 MG: 300 CAPSULE ORAL at 03:05

## 2024-05-02 RX ADMIN — CARVEDILOL 50 MG: 25 TABLET, FILM COATED ORAL at 09:05

## 2024-05-02 RX ADMIN — GABAPENTIN 600 MG: 300 CAPSULE ORAL at 09:05

## 2024-05-02 RX ADMIN — FUROSEMIDE 40 MG: 10 INJECTION, SOLUTION INTRAVENOUS at 08:05

## 2024-05-02 RX ADMIN — INSULIN ASPART 2 UNITS: 100 INJECTION, SOLUTION INTRAVENOUS; SUBCUTANEOUS at 09:05

## 2024-05-02 RX ADMIN — INSULIN DETEMIR 10 UNITS: 100 INJECTION, SOLUTION SUBCUTANEOUS at 03:05

## 2024-05-02 RX ADMIN — APIXABAN 5 MG: 5 TABLET, FILM COATED ORAL at 09:05

## 2024-05-02 NOTE — NURSING
Nurses Note -- 4 Eyes      5/2/2024   3:07 PM      Skin assessed during: Admit      [x] No Altered Skin Integrity Present    []Prevention Measures Documented      [] Yes- Altered Skin Integrity Present or Discovered   [] LDA Added if Not in Epic (Describe Wound)   [] New Altered Skin Integrity was Present on Admit and Documented in LDA   [] Wound Image Taken    Wound Care Consulted? No    Attending Nurse:  Susana Potts RN/Staff Member:   Henny

## 2024-05-02 NOTE — ED NOTES
Telemetry Verification   Patient placed on Telemetry Box  Verified with War Room  Box # [id number]   Rate afib   Rhythm 96

## 2024-05-02 NOTE — CARE UPDATE
[facility] Notice     This is a general notification that this patient was enrolled in the [facility] program - an admission alternative service for patients presenting to [facility].        This patient was stable for discharge and will be care for by our home based team over the next 15 days.       The patient will have several services available to them over the time with the program including in home visits with virtual provider (MD,KYLE) in tandem with an in-person paramedic, virtual nursing, and virtual care management.   The service will be able to manage conditions in the home setting and transition them back to PCP on conclusion.       If you have any questions in regards to patient care during this episode of care call our clinical line, monitored 24/7, at [phone].      If you have any questions in regards to the program, do not hesitate to reach our to Dr. Carlos Lezama, medical director, via secure chat.

## 2024-05-02 NOTE — ED NOTES
Patient identifiers for Ravi Zamorano 68 y.o. male checked and correct.  Chief Complaint   Patient presents with    Shortness of Breath     For 1 wk    Leg Swelling     Bilateral for 1 wk; hx CHF; Kidney transplant 8 yrs ago     Past Medical History:   Diagnosis Date    Anxiety 7/29/2014    Arthritis     Bilateral diabetic retinopathy 2017    CKD (chronic kidney disease) stage 2, GFR 60-89 ml/min 12/28/2016    CKD (chronic kidney disease) stage 4, GFR 15-29 ml/min 7/29/2014    Colon polyps 2014    Depression - situational 7/29/2014    Diabetes mellitus     Diabetes type 2 since 2000 7/29/2014    Diabetic neuropathy 7/29/2014    History of cardioversion 10/3/2019    History of hepatitis C, s/p successful Rx w/ SVR24 - 9/2017 7/29/2014    Genotype 1a, treatment naive 10/2014 liver biopsy - grade 1 / stage 1 Completed Harvoni w/ SVR    Hyperlipidemia 7/29/2014    Hypertension     Neuropathy     Organ transplant candidate 7/29/2014    Pancreatitis     S/P cadaveric kidney transplant 11/5/2016. ESRD secondary to HTN/DMII 11/5/2016    Stage 3a chronic kidney disease 12/28/2016    Type 2 diabetes mellitus with stage 3a chronic kidney disease, with long-term current use of insulin 7/29/2014     Allergies reported:   Review of patient's allergies indicates:   Allergen Reactions    Nifedipine Other (See Comments)     Gingival hyperplasia         LOC: Patient is awake, alert, and aware of environment with an appropriate affect. Patient is oriented x 4 and speaking appropriately.  APPEARANCE: Patient resting comfortably and in no acute distress. Patient is clean and well groomed, patient's clothing is properly fastened.  HEENT: - JVD, + midline trach  SKIN: The skin is warm and dry. Patient has normal skin turgor and moist mucus membranes.   MUSKULOSKELETAL: Patient is moving all extremities well, no obvious deformities noted. Pulses intact.   RESPIRATORY: Airway is open and patent. Respirations are spontaneous and  non-labored with normal effort and rate. Endorses SOB, denies cough.  CARDIAC: Patient has A-Fib with a rate of 90 on cardiac monitor. Bilateral LE edema. Denies c/p.  ABDOMEN: Distention noted. Soft and non-tender upon palpation.  NEUROLOGICAL: pupils 4 mm, PERRL. Facial expression is symmetrical. Hand grasps are equal bilaterally. Normal sensation in all extremities when touched with finger.

## 2024-05-02 NOTE — ED TRIAGE NOTES
67 y/o M presents to ER with c/c SOB, abdominal and bilateral LE edema x 1 week. H/x CHF and kidney transplant. Endorses med compliance, but states that he has had recent medical changes of spironolactone and HCTZ.

## 2024-05-02 NOTE — ASSESSMENT & PLAN NOTE
Kidney transplant   CKD3  - continue home prednisone, cellcept, tacrolimus   - KTM consulted for medication management  - Cr 1.5, baseline   - avoid nephrotoxic agents  - monitor with diuresis

## 2024-05-02 NOTE — Clinical Note
Diagnosis: Acute exacerbation of CHF (congestive heart failure) [450750]   Future Attending Provider: ALINA BROWN [[id number]]

## 2024-05-02 NOTE — HPI
Mr Zamorano is a 68 y.o. male with a PMH of ESRD s/p kidney transplant 2016, AF on eliquis, T2DM, HCV s/p harvoni, HTN, HLD, CHF (LV diastolic dysfunction, pEF), and pancreatitis who presents with worsening SOB and LE edema X 1 week. Yesterday he experienced orthopnea, inability to lay flat, and worsening shortness of breath so he presented to the ED. Patient was taken off spironolactone in March and taken off HCTZ at the beginning of April. He called his PCP and restarted both medications 3 days ago without improvement. He does not follow a strict low salt diet, but denies any recent high salt meals. Patient denies palpitations, chest pain, cough, fever, chills, abdominal pain, N/V, urinary symptoms, light headedness, headache, weakness. His pressure at home have been running high. He is compliant with home medications. He needs to schedule an ablation with Dr Connor for his Afib. No tobacco use.     In ED, /96, HR 95.92% on RA. Afebrile without leukocytosis. CXR with left sided pleural effusion. BNP >4,900. Troponin 0.049. Hgb 7.9 (at baseline for April). Na 131. Cr 1.5, baseline. Given Lasix 40 mg IV X 1 and admitted to observation for CHF exacerbation.

## 2024-05-02 NOTE — H&P
[facility] - Emergency Dept  [facility] Medicine  History & Physical    Patient Name: Ravi Zamorano  MRN: [id number]  Patient Class: OP- Observation  Admission Date: 5/2/2024  Attending Physician: Pedro Cummins DO   Primary Care Provider: Mima Mack MD         Patient information was obtained from patient and ER records.     Subjective:     Principal Problem:Acute on chronic diastolic congestive heart failure    Chief Complaint:   Chief Complaint   Patient presents with    Shortness of Breath     For 1 wk    Leg Swelling     Bilateral for 1 wk; hx CHF; Kidney transplant 8 yrs ago        HPI: Mr Zamorano is a 68 y.o. male with a PMH of ESRD s/p kidney transplant 2016, AF on eliquis, T2DM, HCV s/p harvoni, HTN, HLD, CHF (LV diastolic dysfunction, pEF), and pancreatitis who presents with worsening SOB and LE edema X 1 week. Yesterday he experienced orthopnea, inability to lay flat, and worsening shortness of breath so he presented to the ED. Patient was taken off spironolactone in March and taken off HCTZ at the beginning of April. He called his PCP and restarted both medications 3 days ago without improvement. He does not follow a strict low salt diet, but denies any recent high salt meals. Patient denies palpitations, chest pain, cough, fever, chills, abdominal pain, N/V, urinary symptoms, light headedness, headache, weakness. His pressure at home have been running high. He is compliant with home medications. He needs to schedule an ablation with Dr Connor for his Afib. No tobacco use.     In ED, /96, HR 95.92% on RA. Afebrile without leukocytosis. CXR with left sided pleural effusion. BNP >4,900. Troponin 0.049. Hgb 7.9 (at baseline for April). Na 131. Cr 1.5, baseline. Given Lasix 40 mg IV X 1 and admitted to observation for CHF exacerbation.     Past Medical History:   Diagnosis Date    Anxiety 7/29/2014    Arthritis     Bilateral diabetic retinopathy 2017    CKD (chronic kidney disease) stage  2, GFR 60-89 ml/min 12/28/2016    CKD (chronic kidney disease) stage 4, GFR 15-29 ml/min 7/29/2014    Colon polyps 2014    Depression - situational 7/29/2014    Diabetes mellitus     Diabetes type 2 since 2000 7/29/2014    Diabetic neuropathy 7/29/2014    History of cardioversion 10/3/2019    History of hepatitis C, s/p successful Rx w/ SVR24 - 9/2017 7/29/2014    Genotype 1a, treatment naive 10/2014 liver biopsy - grade 1 / stage 1 Completed Harvoni w/ SVR    Hyperlipidemia 7/29/2014    Hypertension     Neuropathy     Organ transplant candidate 7/29/2014    Pancreatitis     S/P cadaveric kidney transplant 11/5/2016. ESRD secondary to HTN/DMII 11/5/2016    Stage 3a chronic kidney disease 12/28/2016    Type 2 diabetes mellitus with stage 3a chronic kidney disease, with long-term current use of insulin 7/29/2014       Past Surgical History:   Procedure Laterality Date    APPENDECTOMY      CATARACT EXTRACTION W/  INTRAOCULAR LENS IMPLANT Right 3/13/2024    Procedure: EXTRACTION, CATARACT, WITH IOL INSERTION;  Surgeon: Jennifer Palacio MD;  Location: [facility] OR;  Service: Ophthalmology;  Laterality: Right;    CATARACT EXTRACTION W/  INTRAOCULAR LENS IMPLANT Left 4/10/2024    Procedure: EXTRACTION, CATARACT, WITH IOL INSERTION;  Surgeon: Jennifer Palacio MD;  Location: [facility] OR;  Service: Ophthalmology;  Laterality: Left;    COLONOSCOPY N/A 12/21/2020    Procedure: COLONOSCOPY;  Surgeon: Tico Bell MD;  Location: [facility] ENDO ([address] [place] [address]);  Service: Endoscopy;  Laterality: N/A;  ok to hold eliquis per Dr. Valadez, see telephone encounter 11/13/2020-MS  covid test 12/18 [place]    KIDNEY TRANSPLANT      TRANSESOPHAGEAL ECHOCARDIOGRAPHY N/A 8/26/2019    Procedure: ECHOCARDIOGRAM, TRANSESOPHAGEAL;  Surgeon: Dolores Diagnostic Provider;  Location: [facility] EP LAB;  Service: Cardiology;  Laterality: N/A;    TREATMENT OF CARDIAC ARRHYTHMIA N/A 8/26/2019    Procedure: CARDIOVERSION;  Surgeon: Bronson Bowden MD;  Location: [facility] EP LAB;   Service: Cardiology;  Laterality: N/A;  AF, FELICIANO, DCCV, MAC, GP, 3 PREP       Review of patient's allergies indicates:   Allergen Reactions    Nifedipine Other (See Comments)     Gingival hyperplasia       Current Facility-Administered Medications   Medication Dose Route Frequency Provider Last Rate Last Admin    apixaban tablet 5 mg  5 mg Oral BID Jaz Rodriguez, PA-C        aspirin chewable tablet 81 mg  81 mg Oral Daily Jaz Rodriguez., PA-C        atorvastatin tablet 40 mg  40 mg Oral Daily Jaz Rodriguez., PA-C        carvediloL tablet 25 mg  25 mg Oral BID Jaz Rodriguez., PA-C        furosemide injection 40 mg  40 mg Intravenous Q12H Jaz Rodriguez, PA-C        gabapentin capsule 300 mg  300 mg Oral TID Jaz Rodriguez., PA-C        gabapentin capsule 600 mg  600 mg Oral QHS Jaz Rodriguez., PA-C        hydroCHLOROthiazide tablet 25 mg  25 mg Oral Daily Jaz Rodriguez, PA-C        mycophenolate capsule 1,000 mg  1,000 mg Oral BID Jaz Rodriguez, PA-C        ondansetron disintegrating tablet 4 mg  4 mg Oral Q8H PRN Jaz Rodriguez., PA-C        ondansetron injection 4 mg  4 mg Intravenous Q8H PRN Jaz Rodriguez, PA-C        polyethylene glycol packet 17 g  17 g Oral Daily Jaz Rodriguez, PA-C        predniSONE tablet 5 mg  5 mg Oral Daily Jaz Rodriguez., PA-C        sodium chloride 0.9% flush 10 mL  10 mL Intravenous PRN Jaz Rodriguez, PA-C        spironolactone tablet 25 mg  25 mg Oral Daily Jaz Rodriguez., PA-C        tacrolimus capsule 1 mg  1 mg Oral BID Jaz Rodriguez., PA-C        valsartan tablet 160 mg  160 mg Oral Daily Jaz Rodriguez., PA-C         Current Outpatient Medications   Medication Sig Dispense Refill    apixaban (ELIQUIS) 5 mg Tab Take 1 tablet (5 mg total) by mouth 2 (two) times daily. 60 tablet 0    carvediloL (COREG) 25 MG tablet Take 1 tablet (25 mg total) by mouth 2 (two) times daily. 180 tablet 3    empagliflozin (JARDIANCE)  "10 mg tablet Take 1 tablet (10 mg total) by mouth once daily. 30 tablet 11    hydroCHLOROthiazide (HYDRODIURIL) 25 MG tablet Take 1 tablet (25 mg total) by mouth once daily as needed for swelling 30 tablet 11    valsartan (DIOVAN) 160 MG tablet Take 1 tablet (160 mg total) by mouth once daily. 90 tablet 3    aspirin 81 MG Chew Take 81 mg by mouth once daily.      atorvastatin (LIPITOR) 40 MG tablet Take 1 tablet (40 mg total) by mouth once daily. 90 tablet 3    blood-glucose sensor Kayla Gin 3, use as directed, change every 14 days , e 11.65 2 each 11    famotidine (PEPCID) 20 MG tablet Take 1 tablet (20 mg total) by mouth every evening. 90 tablet 3    gabapentin (NEURONTIN) 300 MG capsule Take 1 capsule by mouth in the morning, 1 capsule midday, and 3 capsules at night. 450 capsule 3    insulin (LANTUS SOLOSTAR U-100 INSULIN) glargine 100 units/mL SubQ pen Inject 20 Units into the skin every morning. 15 mL 6    insulin aspart U-100 (NOVOLOG) 100 unit/mL (3 mL) InPn pen Inject 16 units w/ breakfast, 10 units at lunch and dinner scale 180-230+2, 231-280+4, 281-330+6, 331-380+8, >380+10.  *Max daily 66 units.* 30 mL 6    meclizine (ANTIVERT) 25 mg tablet Take 1 tablet (25 mg total) by mouth 3 (three) times daily as needed for Dizziness. 21 tablet 3    mycophenolate (CELLCEPT) 250 mg Cap Take 4 capsules (1,000 mg total) by mouth 2 (two) times daily. HOLD cellcept until Monday (4/8) 240 capsule 11    pantoprazole (PROTONIX) 40 MG tablet Take 1 tablet (40 mg total) by mouth 2 (two) times a day. for 14 days 28 tablet 0    pen needle, diabetic (BD ULTRA-FINE LO PEN NEEDLE) 32 gauge x 5/32" Ndle USE TO ADMINISTER INSULIN 4 TIMES DAILY. 400 each 3    pen needle, diabetic (BD ULTRA-FINE LO PEN NEEDLE) 32 gauge x 5/32" Ndle use four times a day 100 each 11    predniSONE (DELTASONE) 5 MG tablet Take 1 tablet (5 mg total) by mouth once daily. 30 tablet 11    spironolactone (ALDACTONE) 25 MG tablet Take 1 tablet (25 mg " total) by mouth once daily. 90 tablet 3    tacrolimus (PROGRAF) 0.5 MG Cap Take 2 capsules (1 mg total) by mouth every 12 (twelve) hours. 120 capsule 11     Facility-Administered Medications Ordered in Other Encounters   Medication Dose Route Frequency Provider Last Rate Last Admin    balanced salt irrigation intra-ocular solution 1 drop  1 drop Right Eye On Call Procedure Jennifer Palacio MD        phenylephrine HCL 10% ophthalmic solution 1 drop  1 drop Right Eye PRN Jennifer Palacio MD        proparacaine 0.5 % ophthalmic solution 1 drop  1 drop Right Eye Daily PRN Jennifer Palacio MD        sodium chloride 0.9% flush 10 mL  10 mL Intravenous PRN Jennifer Palacio MD        TETRAcaine HCl (PF) 0.5 % Drop 1 drop  1 drop Right Eye PRJennifer Guzman MD         Family History       Problem Relation (Age of Onset)    Cancer Brother    Diabetes Mother    Heart disease Mother, Father    Hypertension Mother, Brother          Tobacco Use    Smoking status: Former     Current packs/day: 0.00     Average packs/day: 0.5 packs/day for 32.0 years (16.0 ttl pk-yrs)     Types: Cigarettes     Start date: 1984     Quit date: 2016     Years since quittin.4    Smokeless tobacco: Never    Tobacco comments:     Pt reports that he quit in , but started up again in . pt reports he is currently working on quitting again   Substance and Sexual Activity    Alcohol use: Yes     Comment: Pt reports occasional beers on Sundays. Pt reports drinking daily prior to ESRD diagnosis.    Drug use: No    Sexual activity: Yes     Partners: Female     Review of Systems   Constitutional:  Negative for fatigue and fever.   HENT:  Negative for congestion and sinus pain.    Respiratory:  Positive for shortness of breath. Negative for wheezing.    Cardiovascular:  Positive for leg swelling. Negative for chest pain and palpitations.   Gastrointestinal:  Negative for abdominal distention, abdominal pain, diarrhea, nausea  and vomiting.   Genitourinary:  Negative for difficulty urinating and dysuria.   Musculoskeletal:  Negative for arthralgias and back pain.   Neurological:  Negative for light-headedness and headaches.   Psychiatric/Behavioral:  Negative for agitation and behavioral problems.      Objective:     Vital Signs (Most Recent):  Temp: 98.5 °F (36.9 °C) (05/02/24 1214)  Pulse: 93 (05/02/24 1214)  Resp: 18 (05/02/24 1214)  BP: (!) 189/95 (05/02/24 1214)  SpO2: 96 % (05/02/24 1214) Vital Signs (24h Range):  Temp:  [97.8 °F (36.6 °C)-99.1 °F (37.3 °C)] 98.5 °F (36.9 °C)  Pulse:  [90-96] 93  Resp:  [15-19] 18  SpO2:  [92 %-96 %] 96 %  BP: (158-201)/(77-96) 189/95     Weight: 95.2 kg (209 lb 14.1 oz)  Body mass index is 27.69 kg/m².     Physical Exam  Vitals and nursing note reviewed.   Constitutional:       Appearance: Normal appearance.   HENT:      Head: Normocephalic and atraumatic.      Nose: Nose normal.      Mouth/Throat:      Mouth: Mucous membranes are moist.      Pharynx: Oropharynx is clear.   Eyes:      Extraocular Movements: Extraocular movements intact.      Conjunctiva/sclera: Conjunctivae normal.      Pupils: Pupils are equal, round, and reactive to light.   Cardiovascular:      Rate and Rhythm: Normal rate. Rhythm irregularly irregular.      Pulses: Normal pulses.      Heart sounds: Normal heart sounds.   Pulmonary:      Effort: Pulmonary effort is normal.      Breath sounds: Rales (faint) present. No wheezing.   Chest:      Chest wall: No tenderness.   Abdominal:      General: Abdomen is flat. Bowel sounds are normal. There is no distension.      Palpations: Abdomen is soft.      Tenderness: There is no abdominal tenderness. There is no guarding.   Musculoskeletal:      Right lower leg: Edema (2+) present.      Left lower leg: Edema (2+) present.   Skin:     General: Skin is warm and dry.      Capillary Refill: Capillary refill takes less than 2 seconds.   Neurological:      General: No focal deficit present.  "     Mental Status: He is alert and oriented to person, place, and time.   Psychiatric:         Mood and Affect: Mood normal.         Behavior: Behavior normal.              CRANIAL NERVES     CN III, IV, VI   Pupils are equal, round, and reactive to light.       Significant Labs: All pertinent labs within the past 24 hours have been reviewed.  CBC:   Recent Labs   Lab 05/02/24  0730   WBC 6.49   HGB 7.9*   HCT 27.9*   *     CMP:   Recent Labs   Lab 05/02/24  0730   *   K 3.6   CL 99   CO2 23   *   BUN 22   CREATININE 1.5*   CALCIUM 8.8   PROT 5.8*   ALBUMIN 3.0*   BILITOT 0.6   ALKPHOS 58   AST 10   ALT <5*   ANIONGAP 9     Cardiac Markers:   Recent Labs   Lab 05/02/24  0730   BNP >4,900*     Coagulation: No results for input(s): "PT", "INR", "APTT" in the last 48 hours.  Magnesium: No results for input(s): "MG" in the last 48 hours.  POCT Glucose: No results for input(s): "POCTGLUCOSE" in the last 48 hours.  Troponin:   Recent Labs   Lab 05/02/24 0730 05/02/24  1103   TROPONINI 0.049* 0.036*       Significant Imaging: I have reviewed all pertinent imaging results/findings within the past 24 hours.  Assessment/Plan:     * Acute on chronic diastolic congestive heart failure  Patient is identified as having Diastolic (HFpEF) heart failure that is Acute on chronic. CHF is currently uncontrolled due to Dyspnea not returned to baseline after 1 doses of IV diuretic. Latest ECHO performed and demonstrates- Results for orders placed during the hospital encounter of 07/10/22    Echo Saline Bubble? No    Interpretation Summary  · The left ventricle is normal in size with eccentric hypertrophy and normal systolic function. The estimated ejection fraction is 65%.  · Normal right ventricular size with normal right ventricular systolic function.  · Left ventricular diastolic dysfunction.  · Biatrial enlargement.  · Mild aortic regurgitation.  · PA systolic pressure is at elast 39 mmHg. The IVC is not " visualized.  . Continue Beta Blocker, ACE/ARB, and Aldactone and monitor clinical status closely. Monitor on telemetry. Patient is on CHF pathway.  Monitor strict Is&Os and daily weights.  Place on fluid restriction of 1.5 L. Cardiology has not been consulted. Continue to stress to patient importance of self efficacy and  on diet for CHF. Last BNP reviewed- and noted below   Recent Labs   Lab 05/02/24  0730   BNP >4,900*       - start lasix 40 mg IV BID  - continue home spironolactone and HCTZ  - repeat TTE pending  - may need addition of lasix at discharge vs inpatient EP consult pending results     Symptomatic anemia  Patient's anemia is currently controlled. Has not received any PRBCs to date. Etiology likely d/t  unclear origin. Has upcoming heme/onc appointment. Normal colonoscopy.   Current CBC reviewed-   Lab Results   Component Value Date    HGB 7.9 (L) 05/02/2024    HCT 27.9 (L) 05/02/2024     Monitor serial CBC and transfuse if patient becomes hemodynamically unstable, symptomatic or H/H drops below 7/21.  - recent anemia panel with b12 440s, MMA pending     Persistent atrial fibrillation  Patient with Permanent atrial fibrillation which is controlled currently with Beta Blocker. Patient is currently in atrial fibrillation.[id number] Score: 2. Anticoagulation indicated. Anticoagulation done with eliquis .    - needs to schedule ablation with Dr Connor  - possibly contributing to symptoms, will evaluate echo then possible EP consult    Immunocompromised state due to drug therapy  Kidney transplant   CKD3  - continue home prednisone, cellcept, tacrolimus   - KTM consulted for medication management  - Cr 1.5, baseline   - avoid nephrotoxic agents  - monitor with diuresis    Hypertension since 2000  Chronic, controlled. Latest blood pressure and vitals reviewed-     Temp:  [97.8 °F (36.6 °C)-99.1 °F (37.3 °C)]   Pulse:  [90-96]   Resp:  [15-19]   BP: (158-201)/(77-96)   SpO2:  [92 %-96 %] .   Home meds  for hypertension were reviewed and noted below.   Hypertension Medications               carvediloL (COREG) 25 MG tablet Take 1 tablet (25 mg total) by mouth 2 (two) times daily.    hydroCHLOROthiazide (HYDRODIURIL) 25 MG tablet Take 1 tablet (25 mg total) by mouth once daily as needed for swelling    valsartan (DIOVAN) 160 MG tablet Take 1 tablet (160 mg total) by mouth once daily.    spironolactone (ALDACTONE) 25 MG tablet Take 1 tablet (25 mg total) by mouth once daily.            While in the hospital, will manage blood pressure as follows; Continue home antihypertensive regimen    Will utilize p.r.n. blood pressure medication only if patient's blood pressure greater than 180/110 and he develops symptoms such as worsening chest pain or shortness of breath.    - BP uncontrolled on admit,   - giving home meds and will monitor with diuresis  - will likely need to increase valsartan if remains high  - instructed to keep BP log at discharge    Type 2 diabetes mellitus with stage 3a chronic kidney disease, with long-term current use of insulin  Creatine stable for now. BMP reviewed- noted Estimated Creatinine Clearance: 54.7 mL/min (A) (based on SCr of 1.5 mg/dL (H)). according to latest data. Based on current GFR, CKD stage is stage 3 - GFR 30-59.  Monitor UOP and serial BMP and adjust therapy as needed. Renally dose meds. Avoid nephrotoxic medications and procedures.    Lab Results   Component Value Date    HGBA1C 8.3 (H) 03/11/2024     - detemir 10 U daily, low dose SSI  - adjust as needed  -weight based to avoid hypoglycemia  - diabetic diet      VTE Risk Mitigation (From admission, onward)           Ordered     apixaban tablet 5 mg  2 times daily         05/02/24 1130     Reason for No Pharmacological VTE Prophylaxis  Once        Question:  Reasons:  Answer:  Already adequately anticoagulated on oral Anticoagulants    05/02/24 1128     IP VTE HIGH RISK PATIENT  Once         05/02/24 1128     Place  sequential compression device  Until discontinued         05/02/24 1128                         On 05/02/2024, patient should be placed in hospital observation services under my care in collaboration with Dr Cummins.           Jaz Rodriguez PA-C  Department of Hospital Medicine  [facility] - Emergency Dept

## 2024-05-02 NOTE — PLAN OF CARE
Patient Aa0x4. No distress noted. Bed siderails x2 in placed. Call light within reach.  Problem: Adult Inpatient Plan of Care  Goal: Plan of Care Review  Outcome: Progressing  Goal: Patient-Specific Goal (Individualized)  Outcome: Progressing  Goal: Absence of Hospital-Acquired Illness or Injury  Outcome: Progressing  Goal: Optimal Comfort and Wellbeing  Outcome: Progressing  Goal: Readiness for Transition of Care  Outcome: Progressing     Problem: Diabetes Comorbidity  Goal: Blood Glucose Level Within Targeted Range  Outcome: Progressing

## 2024-05-02 NOTE — ASSESSMENT & PLAN NOTE
Chronic, controlled. Latest blood pressure and vitals reviewed-     Temp:  [97.8 °F (36.6 °C)-99.1 °F (37.3 °C)]   Pulse:  [90-96]   Resp:  [15-19]   BP: (158-201)/(77-96)   SpO2:  [92 %-96 %] .   Home meds for hypertension were reviewed and noted below.   Hypertension Medications               carvediloL (COREG) 25 MG tablet Take 1 tablet (25 mg total) by mouth 2 (two) times daily.    hydroCHLOROthiazide (HYDRODIURIL) 25 MG tablet Take 1 tablet (25 mg total) by mouth once daily as needed for swelling    valsartan (DIOVAN) 160 MG tablet Take 1 tablet (160 mg total) by mouth once daily.    spironolactone (ALDACTONE) 25 MG tablet Take 1 tablet (25 mg total) by mouth once daily.            While in the hospital, will manage blood pressure as follows; Continue home antihypertensive regimen    Will utilize p.r.n. blood pressure medication only if patient's blood pressure greater than 180/110 and he develops symptoms such as worsening chest pain or shortness of breath.    - BP uncontrolled on admit,   - giving home meds and will monitor with diuresis  - will likely need to increase valsartan if remains high  - instructed to keep BP log at discharge

## 2024-05-02 NOTE — ASSESSMENT & PLAN NOTE
Patient with Permanent atrial fibrillation which is controlled currently with Beta Blocker. Patient is currently in atrial fibrillation.[id number] Score: 2. Anticoagulation indicated. Anticoagulation done with eliquis .    - needs to schedule ablation with Dr Connor  - possibly contributing to symptoms, will evaluate echo then possible EP consult

## 2024-05-02 NOTE — PLAN OF CARE
SW met with Pt and Spouse at bedside.  Verified demographics and preferred appointment time for [facility] f/u.    Referral sent to [facility].    Clementina Zapien LMSW  Case Management [facility] [place]  [phone]

## 2024-05-02 NOTE — SUBJECTIVE & OBJECTIVE
Past Medical History:   Diagnosis Date    Anxiety 7/29/2014    Arthritis     Bilateral diabetic retinopathy 2017    CKD (chronic kidney disease) stage 2, GFR 60-89 ml/min 12/28/2016    CKD (chronic kidney disease) stage 4, GFR 15-29 ml/min 7/29/2014    Colon polyps 2014    Depression - situational 7/29/2014    Diabetes mellitus     Diabetes type 2 since 2000 7/29/2014    Diabetic neuropathy 7/29/2014    History of cardioversion 10/3/2019    History of hepatitis C, s/p successful Rx w/ SVR24 - 9/2017 7/29/2014    Genotype 1a, treatment naive 10/2014 liver biopsy - grade 1 / stage 1 Completed Harvoni w/ SVR    Hyperlipidemia 7/29/2014    Hypertension     Neuropathy     Organ transplant candidate 7/29/2014    Pancreatitis     S/P cadaveric kidney transplant 11/5/2016. ESRD secondary to HTN/DMII 11/5/2016    Stage 3a chronic kidney disease 12/28/2016    Type 2 diabetes mellitus with stage 3a chronic kidney disease, with long-term current use of insulin 7/29/2014       Past Surgical History:   Procedure Laterality Date    APPENDECTOMY      CATARACT EXTRACTION W/  INTRAOCULAR LENS IMPLANT Right 3/13/2024    Procedure: EXTRACTION, CATARACT, WITH IOL INSERTION;  Surgeon: Jennifer Palacio MD;  Location: [facility] OR;  Service: Ophthalmology;  Laterality: Right;    CATARACT EXTRACTION W/  INTRAOCULAR LENS IMPLANT Left 4/10/2024    Procedure: EXTRACTION, CATARACT, WITH IOL INSERTION;  Surgeon: Jennifer Palacio MD;  Location: [facility] OR;  Service: Ophthalmology;  Laterality: Left;    COLONOSCOPY N/A 12/21/2020    Procedure: COLONOSCOPY;  Surgeon: Tico Bell MD;  Location: [facility] ENDO ([address] [place] [address]);  Service: Endoscopy;  Laterality: N/A;  ok to hold eliquis per Dr. Valadez, see telephone encounter 11/13/2020-MS  covid test 12/18 [facility]    KIDNEY TRANSPLANT      TRANSESOPHAGEAL ECHOCARDIOGRAPHY N/A 8/26/2019    Procedure: ECHOCARDIOGRAM, TRANSESOPHAGEAL;  Surgeon: Dolores Diagnostic Provider;  Location: [facility] EP LAB;  Service: Cardiology;   Laterality: N/A;    TREATMENT OF CARDIAC ARRHYTHMIA N/A 8/26/2019    Procedure: CARDIOVERSION;  Surgeon: Bronson Bowden MD;  Location: [facility];  Service: Cardiology;  Laterality: N/A;  AF, FELICIANO, DCCV, MAC, GP, 3 PREP       Review of patient's allergies indicates:   Allergen Reactions    Nifedipine Other (See Comments)     Gingival hyperplasia       Current Facility-Administered Medications   Medication Dose Route Frequency Provider Last Rate Last Admin    apixaban tablet 5 mg  5 mg Oral BID Jaz Rodriguez., PA-C        aspirin chewable tablet 81 mg  81 mg Oral Daily Jaz Rodriguez., PA-C        atorvastatin tablet 40 mg  40 mg Oral Daily Jaz Rodriguez., PA-C        carvediloL tablet 25 mg  25 mg Oral BID Jaz Rodriguez., PA-C        furosemide injection 40 mg  40 mg Intravenous Q12H Jaz Rodriguez., PA-C        gabapentin capsule 300 mg  300 mg Oral TID Jaz Rodriguez., PA-C        gabapentin capsule 600 mg  600 mg Oral QHS Jaz Rodriguez., PA-C        hydroCHLOROthiazide tablet 25 mg  25 mg Oral Daily Jaz Rodriguez., PA-C        mycophenolate capsule 1,000 mg  1,000 mg Oral BID Jaz Rodriguez., PA-C        ondansetron disintegrating tablet 4 mg  4 mg Oral Q8H PRN Jaz Rodriguez., PA-C        ondansetron injection 4 mg  4 mg Intravenous Q8H PRN Jaz Rodriguez., PA-C        polyethylene glycol packet 17 g  17 g Oral Daily Jaz Rodriguez., PA-C        predniSONE tablet 5 mg  5 mg Oral Daily Jaz Rodriguez., PA-C        sodium chloride 0.9% flush 10 mL  10 mL Intravenous PRN Jaz Rodriguez., PA-C        spironolactone tablet 25 mg  25 mg Oral Daily Jaz Rodriguez., PA-C        tacrolimus capsule 1 mg  1 mg Oral BID Jaz Rodriguez., PA-C        valsartan tablet 160 mg  160 mg Oral Daily Jennifer, Jaz HORN., PA-C         Current Outpatient Medications   Medication Sig Dispense Refill    apixaban (ELIQUIS) 5 mg Tab Take 1 tablet (5 mg total) by mouth 2 (two) times  "daily. 60 tablet 0    carvediloL (COREG) 25 MG tablet Take 1 tablet (25 mg total) by mouth 2 (two) times daily. 180 tablet 3    empagliflozin (JARDIANCE) 10 mg tablet Take 1 tablet (10 mg total) by mouth once daily. 30 tablet 11    hydroCHLOROthiazide (HYDRODIURIL) 25 MG tablet Take 1 tablet (25 mg total) by mouth once daily as needed for swelling 30 tablet 11    valsartan (DIOVAN) 160 MG tablet Take 1 tablet (160 mg total) by mouth once daily. 90 tablet 3    aspirin 81 MG Chew Take 81 mg by mouth once daily.      atorvastatin (LIPITOR) 40 MG tablet Take 1 tablet (40 mg total) by mouth once daily. 90 tablet 3    blood-glucose sensor Kayla Gin 3, use as directed, change every 14 days , e 11.65 2 each 11    famotidine (PEPCID) 20 MG tablet Take 1 tablet (20 mg total) by mouth every evening. 90 tablet 3    gabapentin (NEURONTIN) 300 MG capsule Take 1 capsule by mouth in the morning, 1 capsule midday, and 3 capsules at night. 450 capsule 3    insulin (LANTUS SOLOSTAR U-100 INSULIN) glargine 100 units/mL SubQ pen Inject 20 Units into the skin every morning. 15 mL 6    insulin aspart U-100 (NOVOLOG) 100 unit/mL (3 mL) InPn pen Inject 16 units w/ breakfast, 10 units at lunch and dinner scale 180-230+2, 231-280+4, 281-330+6, 331-380+8, >380+10.  *Max daily 66 units.* 30 mL 6    meclizine (ANTIVERT) 25 mg tablet Take 1 tablet (25 mg total) by mouth 3 (three) times daily as needed for Dizziness. 21 tablet 3    mycophenolate (CELLCEPT) 250 mg Cap Take 4 capsules (1,000 mg total) by mouth 2 (two) times daily. HOLD cellcept until Monday (4/8) 240 capsule 11    pantoprazole (PROTONIX) 40 MG tablet Take 1 tablet (40 mg total) by mouth 2 (two) times a day. for 14 days 28 tablet 0    pen needle, diabetic (BD ULTRA-FINE LO PEN NEEDLE) 32 gauge x 5/32" Ndle USE TO ADMINISTER INSULIN 4 TIMES DAILY. 400 each 3    pen needle, diabetic (BD ULTRA-FINE LO PEN NEEDLE) 32 gauge x 5/32" Ndle use four times a day 100 each 11    predniSONE " (DELTASONE) 5 MG tablet Take 1 tablet (5 mg total) by mouth once daily. 30 tablet 11    spironolactone (ALDACTONE) 25 MG tablet Take 1 tablet (25 mg total) by mouth once daily. 90 tablet 3    tacrolimus (PROGRAF) 0.5 MG Cap Take 2 capsules (1 mg total) by mouth every 12 (twelve) hours. 120 capsule 11     Facility-Administered Medications Ordered in Other Encounters   Medication Dose Route Frequency Provider Last Rate Last Admin    balanced salt irrigation intra-ocular solution 1 drop  1 drop Right Eye On Call Procedure Jennifer Palacio MD        phenylephrine HCL 10% ophthalmic solution 1 drop  1 drop Right Eye PRN Jennifer Palacio MD        proparacaine 0.5 % ophthalmic solution 1 drop  1 drop Right Eye Daily PRN Jennifer Palacio MD        sodium chloride 0.9% flush 10 mL  10 mL Intravenous PRN Jennifer Palacio MD        TETRAcaine HCl (PF) 0.5 % Drop 1 drop  1 drop Right Eye PRN Jennifer Palacio MD         Family History       Problem Relation (Age of Onset)    Cancer Brother    Diabetes Mother    Heart disease Mother, Father    Hypertension Mother, Brother          Tobacco Use    Smoking status: Former     Current packs/day: 0.00     Average packs/day: 0.5 packs/day for 32.0 years (16.0 ttl pk-yrs)     Types: Cigarettes     Start date: 1984     Quit date: 2016     Years since quittin.4    Smokeless tobacco: Never    Tobacco comments:     Pt reports that he quit in , but started up again in . pt reports he is currently working on quitting again   Substance and Sexual Activity    Alcohol use: Yes     Comment: Pt reports occasional beers on Sundays. Pt reports drinking daily prior to ESRD diagnosis.    Drug use: No    Sexual activity: Yes     Partners: Female     Review of Systems   Constitutional:  Negative for fatigue and fever.   HENT:  Negative for congestion and sinus pain.    Respiratory:  Positive for shortness of breath. Negative for wheezing.    Cardiovascular:  Positive  for leg swelling. Negative for chest pain and palpitations.   Gastrointestinal:  Negative for abdominal distention, abdominal pain, diarrhea, nausea and vomiting.   Genitourinary:  Negative for difficulty urinating and dysuria.   Musculoskeletal:  Negative for arthralgias and back pain.   Neurological:  Negative for light-headedness and headaches.   Psychiatric/Behavioral:  Negative for agitation and behavioral problems.      Objective:     Vital Signs (Most Recent):  Temp: 98.5 °F (36.9 °C) (05/02/24 1214)  Pulse: 93 (05/02/24 1214)  Resp: 18 (05/02/24 1214)  BP: (!) 189/95 (05/02/24 1214)  SpO2: 96 % (05/02/24 1214) Vital Signs (24h Range):  Temp:  [97.8 °F (36.6 °C)-99.1 °F (37.3 °C)] 98.5 °F (36.9 °C)  Pulse:  [90-96] 93  Resp:  [15-19] 18  SpO2:  [92 %-96 %] 96 %  BP: (158-201)/(77-96) 189/95     Weight: 95.2 kg (209 lb 14.1 oz)  Body mass index is 27.69 kg/m².     Physical Exam  Vitals and nursing note reviewed.   Constitutional:       Appearance: Normal appearance.   HENT:      Head: Normocephalic and atraumatic.      Nose: Nose normal.      Mouth/Throat:      Mouth: Mucous membranes are moist.      Pharynx: Oropharynx is clear.   Eyes:      Extraocular Movements: Extraocular movements intact.      Conjunctiva/sclera: Conjunctivae normal.      Pupils: Pupils are equal, round, and reactive to light.   Cardiovascular:      Rate and Rhythm: Normal rate. Rhythm irregularly irregular.      Pulses: Normal pulses.      Heart sounds: Normal heart sounds.   Pulmonary:      Effort: Pulmonary effort is normal.      Breath sounds: Rales (faint) present. No wheezing.   Chest:      Chest wall: No tenderness.   Abdominal:      General: Abdomen is flat. Bowel sounds are normal. There is no distension.      Palpations: Abdomen is soft.      Tenderness: There is no abdominal tenderness. There is no guarding.   Musculoskeletal:      Right lower leg: Edema (2+) present.      Left lower leg: Edema (2+) present.   Skin:      "General: Skin is warm and dry.      Capillary Refill: Capillary refill takes less than 2 seconds.   Neurological:      General: No focal deficit present.      Mental Status: He is alert and oriented to person, place, and time.   Psychiatric:         Mood and Affect: Mood normal.         Behavior: Behavior normal.              CRANIAL NERVES     CN III, IV, VI   Pupils are equal, round, and reactive to light.       Significant Labs: All pertinent labs within the past 24 hours have been reviewed.  CBC:   Recent Labs   Lab 05/02/24  0730   WBC 6.49   HGB 7.9*   HCT 27.9*   *     CMP:   Recent Labs   Lab 05/02/24  0730   *   K 3.6   CL 99   CO2 23   *   BUN 22   CREATININE 1.5*   CALCIUM 8.8   PROT 5.8*   ALBUMIN 3.0*   BILITOT 0.6   ALKPHOS 58   AST 10   ALT <5*   ANIONGAP 9     Cardiac Markers:   Recent Labs   Lab 05/02/24  0730   BNP >4,900*     Coagulation: No results for input(s): "PT", "INR", "APTT" in the last 48 hours.  Magnesium: No results for input(s): "MG" in the last 48 hours.  POCT Glucose: No results for input(s): "POCTGLUCOSE" in the last 48 hours.  Troponin:   Recent Labs   Lab 05/02/24  0730 05/02/24  1103   TROPONINI 0.049* 0.036*       Significant Imaging: I have reviewed all pertinent imaging results/findings within the past 24 hours.  "

## 2024-05-02 NOTE — ASSESSMENT & PLAN NOTE
Patient is identified as having Diastolic (HFpEF) heart failure that is Acute on chronic. CHF is currently uncontrolled due to Dyspnea not returned to baseline after 1 doses of IV diuretic. Latest ECHO performed and demonstrates- Results for orders placed during the hospital encounter of 07/10/22    Echo Saline Bubble? No    Interpretation Summary  · The left ventricle is normal in size with eccentric hypertrophy and normal systolic function. The estimated ejection fraction is 65%.  · Normal right ventricular size with normal right ventricular systolic function.  · Left ventricular diastolic dysfunction.  · Biatrial enlargement.  · Mild aortic regurgitation.  · PA systolic pressure is at elast 39 mmHg. The IVC is not visualized.  . Continue Beta Blocker, ACE/ARB, and Aldactone and monitor clinical status closely. Monitor on telemetry. Patient is on CHF pathway.  Monitor strict Is&Os and daily weights.  Place on fluid restriction of 1.5 L. Cardiology has not been consulted. Continue to stress to patient importance of self efficacy and  on diet for CHF. Last BNP reviewed- and noted below   Recent Labs   Lab 05/02/24  0730   BNP >4,900*       - start lasix 40 mg IV BID  - continue home spironolactone and HCTZ  - repeat TTE pending  - may need addition of lasix at discharge vs inpatient EP consult pending results

## 2024-05-02 NOTE — ASSESSMENT & PLAN NOTE
Creatine stable for now. BMP reviewed- noted Estimated Creatinine Clearance: 54.7 mL/min (A) (based on SCr of 1.5 mg/dL (H)). according to latest data. Based on current GFR, CKD stage is stage 3 - GFR 30-59.  Monitor UOP and serial BMP and adjust therapy as needed. Renally dose meds. Avoid nephrotoxic medications and procedures.    Lab Results   Component Value Date    HGBA1C 8.3 (H) 03/11/2024     - detemir 10 U daily, low dose SSI  - adjust as needed  -weight based to avoid hypoglycemia  - diabetic diet

## 2024-05-02 NOTE — ED PROVIDER NOTES
Encounter Date: 5/2/2024       History     Chief Complaint   Patient presents with    Shortness of Breath     For 1 wk    Leg Swelling     Bilateral for 1 wk; hx CHF; Kidney transplant 8 yrs ago     68 y.o. male with a PMH of ESRD s/p kidney transplant 2016, pAF on eliquis, T2DM, HCV s/p harvoni, HTN, HLD, CHF (LV diastolic dysfunction, pEF), and pancreatitis presents to [facility] for evaluation of dyspnea and bilateral lower extremity edema x 1 week.     On HCTZ and spironolactone previously; discontinued in March, restarted 3 days ago.   Denies fever, chills, chest pain, nausea, vomiting, abdominal pain, diarrhea, dysuria.   Reports having 1 prior episode of edema but this episode is significantly worse and new.     Patient was transfused 2u PRBC 5 weeks ago for anemia. No apparent cause of blood loss, has had regular screening colonoscopies. Scheduled for outpatient heme onc follow up.     The history is provided by the patient and medical records. No  was used.     Review of patient's allergies indicates:   Allergen Reactions    Nifedipine Other (See Comments)     Gingival hyperplasia     Past Medical History:   Diagnosis Date    Anxiety 7/29/2014    Arthritis     Bilateral diabetic retinopathy 2017    CKD (chronic kidney disease) stage 2, GFR 60-89 ml/min 12/28/2016    CKD (chronic kidney disease) stage 4, GFR 15-29 ml/min 7/29/2014    Colon polyps 2014    Depression - situational 7/29/2014    Diabetes mellitus     Diabetes type 2 since 2000 7/29/2014    Diabetic neuropathy 7/29/2014    History of cardioversion 10/3/2019    History of hepatitis C, s/p successful Rx w/ SVR24 - 9/2017 7/29/2014    Genotype 1a, treatment naive 10/2014 liver biopsy - grade 1 / stage 1 Completed Harvoni w/ SVR    Hyperlipidemia 7/29/2014    Hypertension     Neuropathy     Organ transplant candidate 7/29/2014    Pancreatitis     S/P cadaveric kidney transplant 11/5/2016. ESRD secondary to HTN/DMII  11/5/2016    Stage 3a chronic kidney disease 12/28/2016    Type 2 diabetes mellitus with stage 3a chronic kidney disease, with long-term current use of insulin 7/29/2014     Past Surgical History:   Procedure Laterality Date    APPENDECTOMY      CATARACT EXTRACTION W/  INTRAOCULAR LENS IMPLANT Right 3/13/2024    Procedure: EXTRACTION, CATARACT, WITH IOL INSERTION;  Surgeon: Jennifer Palacio MD;  Location: [facility] OR;  Service: Ophthalmology;  Laterality: Right;    CATARACT EXTRACTION W/  INTRAOCULAR LENS IMPLANT Left 4/10/2024    Procedure: EXTRACTION, CATARACT, WITH IOL INSERTION;  Surgeon: Jennifer Palacio MD;  Location: [facility] OR;  Service: Ophthalmology;  Laterality: Left;    COLONOSCOPY N/A 12/21/2020    Procedure: COLONOSCOPY;  Surgeon: Tico Bell MD;  Location: [facility] ENDO (4TH FLR);  Service: Endoscopy;  Laterality: N/A;  ok to hold eliquis per Dr. Valadez, see telephone encounter 11/13/2020-MS  covid test 12/18 [place]    KIDNEY TRANSPLANT      TRANSESOPHAGEAL ECHOCARDIOGRAPHY N/A 8/26/2019    Procedure: ECHOCARDIOGRAM, TRANSESOPHAGEAL;  Surgeon: Dolores Diagnostic Provider;  Location: [facility] EP LAB;  Service: Cardiology;  Laterality: N/A;    TREATMENT OF CARDIAC ARRHYTHMIA N/A 8/26/2019    Procedure: CARDIOVERSION;  Surgeon: Bronson Bowden MD;  Location: [facility] EP LAB;  Service: Cardiology;  Laterality: N/A;  AF, FELICIANO, DCCV, MAC, GP, 3 PREP     Family History   Problem Relation Name Age of Onset    Diabetes Mother      Hypertension Mother      Heart disease Mother          CAD with PCI    Heart disease Father      Cancer Brother 2         one sister with breast cancer    Hypertension Brother 2         one sister with HTN and borderline DM    Stroke Neg Hx      Kidney disease Neg Hx       Social History     Tobacco Use    Smoking status: Former     Current packs/day: 0.00     Average packs/day: 0.5 packs/day for 32.0 years (16.0 ttl pk-yrs)     Types: Cigarettes     Start date: 11/28/1984     Quit date: 11/28/2016      Years since quittin.4    Smokeless tobacco: Never    Tobacco comments:     Pt reports that he quit in , but started up again in . pt reports he is currently working on quitting again   Substance Use Topics    Alcohol use: Yes     Comment: Pt reports occasional beers on Sundays. Pt reports drinking daily prior to ESRD diagnosis.    Drug use: No     Review of Systems    Physical Exam     Initial Vitals [24 0641]   BP Pulse Resp Temp SpO2   (!) 158/77 96 16 97.8 °F (36.6 °C) 96 %      MAP       --         Physical Exam    Nursing note and vitals reviewed.  Constitutional: He appears well-developed and well-nourished. He is cooperative. No distress.   HENT:   Head: Normocephalic.   Mouth/Throat: Oropharynx is clear and moist.   Eyes: Pupils are equal, round, and reactive to light. No scleral icterus.   Neck: Neck supple.   Cardiovascular:  Normal rate and regular rhythm.           Pulmonary/Chest: Breath sounds normal. No stridor. No respiratory distress.   Abdominal: Abdomen is soft. He exhibits no distension. There is no abdominal tenderness.   Musculoskeletal:         General: Edema (2+ pitting edema to BLE) present.      Cervical back: Neck supple.     Neurological: He is alert. GCS score is 15. GCS eye subscore is 4. GCS verbal subscore is 5. GCS motor subscore is 6.   Skin: Skin is warm and dry. No rash noted.         ED Course   Procedures  Labs Reviewed   CBC W/ AUTO DIFFERENTIAL - Abnormal; Notable for the following components:       Result Value    RBC 3.25 (*)     Hemoglobin 7.9 (*)     Hematocrit 27.9 (*)     MCH 24.3 (*)     MCHC 28.3 (*)     RDW 17.1 (*)     Platelets 124 (*)     Immature Granulocytes 4.5 (*)     Immature Grans (Abs) 0.29 (*)     Lymph # 0.4 (*)     Mono # 1.2 (*)     Lymph % 6.8 (*)     Mono % 18.3 (*)     All other components within normal limits   COMPREHENSIVE METABOLIC PANEL - Abnormal; Notable for the following components:    Sodium 131 (*)     Glucose 215 (*)      Creatinine 1.5 (*)     Total Protein 5.8 (*)     Albumin 3.0 (*)     ALT <5 (*)     eGFR 51.0 (*)     All other components within normal limits   B-TYPE NATRIURETIC PEPTIDE - Abnormal; Notable for the following components:    BNP >4,900 (*)     All other components within normal limits   TROPONIN I - Abnormal; Notable for the following components:    Troponin I 0.049 (*)     All other components within normal limits   TROPONIN I - Abnormal; Notable for the following components:    Troponin I 0.036 (*)     All other components within normal limits   HEMOGLOBIN A1C - Abnormal; Notable for the following components:    Hemoglobin A1C 6.8 (*)     Estimated Avg Glucose 148 (*)     All other components within normal limits   MAGNESIUM - Abnormal; Notable for the following components:    Magnesium 1.4 (*)     All other components within normal limits   TACROLIMUS LEVEL   METHYLMALONIC ACID, SERUM   TYPE & SCREEN     EKG Readings: (Independently Interpreted)   Initial Reading: No STEMI. Previous EKG: Compared with most recent EKG Rhythm: Atrial Fibrillation. Heart Rate: 90. Clinical Impression: Atrial Fibrillation     ECG Results              EKG 12-lead (Final result)        Collection Time Result Time QRS Duration OHS QTC Calculation    05/02/24 06:45:17 05/02/24 13:04:24 94 437                     Final result by Interface, Lab In [facility] (05/02/24 13:04:28)                   Narrative:    Test Reason : R06.02,    Vent. Rate : 090 BPM     Atrial Rate : 000 BPM     P-R Int : 000 ms          QRS Dur : 094 ms      QT Int : 358 ms       P-R-T Axes : 000 036 232 degrees     QTc Int : 437 ms    Atrial fibrillation with premature ventricular or aberrantly conducted  complexes  Septal infarct (cited on or before 29-APR-2024)  ST and T wave abnormality, consider inferior ischemia  Abnormal ECG  When compared with ECG of 29-APR-2024 11:01,  Serial changes of Septal infarct Present  Confirmed by Elsa Mack MD ([id number]) on  5/2/2024 1:04:21 PM    Referred By:             Confirmed By:Elsa Mack MD                                  Imaging Results              X-Ray Chest AP Portable (Final result)  Result time 05/02/24 08:39:31      Final result by Tico López MD (05/02/24 08:39:31)                   Impression:      Interval development of a small amount of left pleural fluid since 04/02/2024, but allowing for a poorer inspiratory depth level on the current examination, the appearance of the chest is otherwise unchanged since that time.      Electronically signed by: Tico López MD  Date:    05/02/2024  Time:    08:39               Narrative:    EXAMINATION:  XR CHEST AP PORTABLE    CLINICAL HISTORY:  dyspnea;    TECHNIQUE:  One view    COMPARISON:  Comparison is made to 04/02/2024.    FINDINGS:  Heart size and the appearance of the cardiomediastinal silhouette have not changed appreciably since the examination referenced above.  Pulmonary vascularity remains normal, and there are no findings indicating current cardiac decompensation.  Lung zones appear stable and essentially clear, free of significant airspace consolidation or volume loss.  Minimal blunting of the left costophrenic sulcus is now observed, consistent with a small amount pleural fluid on this side.  No pleural fluid on the right.  No pneumothorax.                                    X-Rays:   Independently Interpreted Readings:   Chest X-Ray: Left pleural effusion present.     Medications   sodium chloride 0.9% flush 10 mL (has no administration in time range)   furosemide injection 40 mg (40 mg Intravenous Given 5/2/24 1402)   ondansetron disintegrating tablet 4 mg (has no administration in time range)   ondansetron injection 4 mg (has no administration in time range)   apixaban tablet 5 mg (5 mg Oral Not Given 5/2/24 1230)   aspirin chewable tablet 81 mg (81 mg Oral Not Given 5/2/24 1230)   atorvastatin tablet 40 mg (40 mg Oral Not Given 5/2/24 1230)    gabapentin capsule 300 mg (300 mg Oral Given 5/2/24 1544)   hydroCHLOROthiazide tablet 25 mg (25 mg Oral Not Given 5/2/24 1245)   mycophenolate capsule 1,000 mg (1,000 mg Oral Not Given 5/2/24 1245)   predniSONE tablet 5 mg (5 mg Oral Not Given 5/2/24 1245)   spironolactone tablet 25 mg (25 mg Oral Not Given 5/2/24 1245)   tacrolimus capsule 1 mg (has no administration in time range)   valsartan tablet 160 mg (160 mg Oral Not Given 5/2/24 1245)   gabapentin capsule 600 mg (has no administration in time range)   sodium chloride 0.9% flush 5 mL (has no administration in time range)   albuterol-ipratropium 2.5 mg-0.5 mg/3 mL nebulizer solution 3 mL (has no administration in time range)   polyethylene glycol packet 17 g (17 g Oral Not Given 5/2/24 1315)   bisacodyL suppository 10 mg (has no administration in time range)   acetaminophen tablet 650 mg (has no administration in time range)   acetaminophen tablet 650 mg (has no administration in time range)   HYDROcodone-acetaminophen 5-325 mg per tablet 1 tablet (has no administration in time range)   naloxone 0.4 mg/mL injection 0.02 mg (has no administration in time range)   glucose chewable tablet 16 g (has no administration in time range)   glucose chewable tablet 24 g (has no administration in time range)   glucagon (human recombinant) injection 1 mg (has no administration in time range)   insulin aspart U-100 pen 0-5 Units (has no administration in time range)   insulin detemir U-100 (Levemir) pen 10 Units (10 Units Subcutaneous Given 5/2/24 1544)   simethicone chewable tablet 80 mg (has no administration in time range)   aluminum-magnesium hydroxide-simethicone 200-200-20 mg/5 mL suspension 30 mL (has no administration in time range)   dextrose 10% bolus 125 mL 125 mL (has no administration in time range)   dextrose 10% bolus 250 mL 250 mL (has no administration in time range)   hydrALAZINE tablet 25 mg (25 mg Oral Given 5/2/24 1339)   carvediloL tablet 50 mg (has  no administration in time range)   furosemide injection 40 mg (40 mg Intravenous Given 5/2/24 0808)     Medical Decision Making  69 y/o M with CHF presents for edema and dyspnea x 1 week.     Patient is afebrile, hemodynamically stable, and in no acute distress on arrival. 2+ pitting edema to bilateral lower extremities. Spo2 100% on RA.     Differential diagnoses considered include, but not limited to: CHF, ACS, PE, pneumonia, dependent edema, PHUONG, anemia     EKG with rate controlled a fib. BNP >4900, up from prior. Troponin slightly elevated, suspect demand ischemia as pt has no chest pain and trop is around or less than his baseline. CXR with small left pleural effusion, new from prior. Patient is on eliquis and has no evidence of DVT; doubt PE.   40 IV lasix given.  Hgb 7.9, slightly decreased from prior. Type and screen ordered.     Will admit to  for CHF exacerbation.   I discussed admission with hospital medicine who agreed to admit the patient for further evaluation and management.       Amount and/or Complexity of Data Reviewed  Labs: ordered. Decision-making details documented in ED Course.  Radiology: ordered and independent interpretation performed. Decision-making details documented in ED Course.  ECG/medicine tests: ordered and independent interpretation performed. Decision-making details documented in ED Course.    Risk  Prescription drug management.  Decision regarding hospitalization.                                      Clinical Impression:  Final diagnoses:  [R06.02] Shortness of breath  [I50.9] Acute exacerbation of CHF (congestive heart failure)  [D64.9] Anemia, unspecified type (Primary)  [M79.89] Leg swelling          ED Disposition Condition    Observation Stable                Ledy Carrera MD  Resident  05/02/24 [id number]

## 2024-05-02 NOTE — ASSESSMENT & PLAN NOTE
Patient's anemia is currently controlled. Has not received any PRBCs to date. Etiology likely d/t  unclear origin. Has upcoming heme/onc appointment. Normal colonoscopy.   Current CBC reviewed-   Lab Results   Component Value Date    HGB 7.9 (L) 05/02/2024    HCT 27.9 (L) 05/02/2024     Monitor serial CBC and transfuse if patient becomes hemodynamically unstable, symptomatic or H/H drops below 7/21.  - recent anemia panel with b12 440s, MMA pending

## 2024-05-02 NOTE — PROGRESS NOTES
Subjective:     HPI    I had the pleasure of seeing Ravi Zamorano in follow-up for his history of atrial fibrillation. Last seen in 2/2024. He is a 68 year old male with a history of DM on insulin, CKD2, history of kidney transplant in 2016 (Cr 1.5 in 2/2021), and diastolic dysfunction, who was diagnosed with rate controlled AF in 8/2019, manifesting as worsening DOSS. An echo done in 8/2019 showed an EF of 55% and severe LAE (MINA 54.3 mL/m2). He underwent DCCV in 8/2010, and was started on flecainide 100 mg bid post-procedure.    An echo in 6/2021 showed an EF of 55%. A 2 week [employer] DX monitor done in 7/2019 showed no arrhythmias.    At his 4/2023 visit, he was maintaining sinus rhythm but reporting more LH and DOSS. QRS wide on ECG--flecainide reduced to 50 mg bid. By 5/2023 QRS had narrowed, although he had an episode of syncope in 5/2023 thought to be due to hypotension 2/2 chlorthalidone.      SPECT 5/15/2023 negative for ischemia or scar. Event monitor 1/2024--one episode of AF noted 12/24/2023, and episodes of SR and junctional rhythm with HR ranging from 54-74.     At 2/2024 pt was in sinus rhythm. Reported chronic DOSS. Episodes of LH when hypotensive. Creatinine of 1.7 in 11/2023.  ms.    Pt seen by cardiology in 4/2024--back in AF. Presented to ED in 5/2024 with rate controlled AF and worsening HF sx..     Admitted in 4/2024 for anemia (Hg 5.7). GI thought it was low likelihood this was 2/2 GIB. Awaiting outpatient hematology input. Back on eliquis. Hg stable over the past month or so.    ADHF admission on 5/2024.     My interpretation of today's ECG is    Review of Systems   Constitutional: Negative for decreased appetite, malaise/fatigue, weight gain and weight loss.   HENT:  Negative for sore throat.    Eyes:  Negative for blurred vision.   Cardiovascular:  Negative for chest pain, dyspnea on exertion, irregular heartbeat, leg swelling, near-syncope, orthopnea, palpitations, paroxysmal  nocturnal dyspnea and syncope.   Respiratory:  Negative for shortness of breath.    Skin:  Negative for rash.   Musculoskeletal:  Negative for arthritis.   Gastrointestinal:  Negative for abdominal pain.   Neurological:  Positive for light-headedness. Negative for focal weakness.   Psychiatric/Behavioral:  Negative for altered mental status.         Objective:    Physical Exam  Vitals and nursing note reviewed.   Constitutional:       General: He is not in acute distress.     Appearance: He is well-developed.   HENT:      Head: Normocephalic and atraumatic.   Eyes:      General: No scleral icterus.     Pupils: Pupils are equal, round, and reactive to light.   Neck:      Thyroid: No thyromegaly.   Cardiovascular:      Rate and Rhythm: Rhythm irregularly irregular.      Pulses: Normal pulses.      Heart sounds: Normal heart sounds. No murmur heard.     No friction rub. No gallop.   Pulmonary:      Effort: Pulmonary effort is normal.      Breath sounds: Normal breath sounds.   Abdominal:      General: Bowel sounds are normal. There is no distension.      Palpations: Abdomen is soft.      Tenderness: There is no abdominal tenderness.   Musculoskeletal:      Cervical back: Neck supple.   Skin:     General: Skin is warm and dry.      Findings: No rash.   Neurological:      Mental Status: He is alert and oriented to person, place, and time.   Psychiatric:         Behavior: Behavior normal.         Assessment:       1. Persistent atrial fibrillation    2. Mixed hyperlipidemia    3. Chronic diastolic congestive heart failure    4. S/P cadaveric kidney transplant 11/5/2016. ESRD secondary to HTN/DMII    5. Anticoagulant long-term use    6. Thrombocytopenia, unspecified    7. Type 2 diabetes mellitus with stage 3a chronic kidney disease, with long-term current use of insulin         Plan:       In summary, Ravi Zamorano is a 68 year old male with persistent AF, who underwent DCCV and flecainide initiation in 8/2019. Over  the past several months he has had recurrent symptomatic AF.    We discussed in detail the pathophysiology of AF as well as the myriad of treatment options available to manage it including antiarrhythmics and catheter ablation. We specifically discussed the risks, benefits, indications, and alternatives to PVI. Risks discussed include bleeding, hematoma, vascular damage, cardiac tamponade, stroke, PV stenosis, AE fistula, phrenic nerve damage, and death.  After considering his options he has decided to proceed.     CARTO. Hold eliquis AM of procedure. FELICIANO--cx if sinus. Post-procedure will initiate a brief course of antiarrhythmics. Will also check for stable Hg in the weeks leading up to procedure, and postpone if Hg drifts down or if work-up identifies a source/problem that needs to be addressed.    Thank you for allowing me to participate in the care of this patient. Please do not hesitate to call me with any questions or concerns.

## 2024-05-02 NOTE — ED NOTES
Attempted to give pt 1230 meds. Pt reported that he already took all of his meds this morning before coming to the ED. MD notified

## 2024-05-02 NOTE — ED NOTES
Bed: EDOU05  Expected date: 5/2/24  Expected time: 10:03 AM  Means of arrival:   Comments:  No bed

## 2024-05-02 NOTE — DISCHARGE INSTRUCTIONS
You are being discharged from the hospital and will continued to be cared for by the [facility] team.  We will see you in your home around 12-4pm.  Call [phone] for any needs relating to your care 24 hours a day, 7 days a week.

## 2024-05-02 NOTE — NURSING
Patient arrived via wheelchair. Patient is AAOX4. Patient has edema bilaterally in ankles +2. Skin intact dry and warm. Patient B/P on arrival 180/115 by dinamap, but was assessed manually and it was 166/84. Patient stated he took his medication at home in the ED. Patient educate about the importance of following medication orders at the hospital and not to take home med while in hospital.  Call light within reach. Bed in the lowest position. Side rails X2 in place.

## 2024-05-03 ENCOUNTER — DOCUMENTATION ONLY (OUTPATIENT)
Dept: CARDIOLOGY | Facility: CLINIC | Age: 69
End: 2024-05-03
Payer: MEDICARE

## 2024-05-03 VITALS
BODY MASS INDEX: 26.59 KG/M2 | TEMPERATURE: 99 F | DIASTOLIC BLOOD PRESSURE: 76 MMHG | HEIGHT: 73 IN | HEART RATE: 94 BPM | WEIGHT: 200.63 LBS | OXYGEN SATURATION: 97 % | RESPIRATION RATE: 16 BRPM | SYSTOLIC BLOOD PRESSURE: 158 MMHG

## 2024-05-03 DIAGNOSIS — R06.02 SOB (SHORTNESS OF BREATH): Primary | ICD-10-CM

## 2024-05-03 LAB
ANION GAP SERPL CALC-SCNC: 11 MMOL/L (ref 8–16)
ASCENDING AORTA: 3.44 CM
AV INDEX (PROSTH): 0.66
AV MEAN GRADIENT: 3 MMHG
AV PEAK GRADIENT: 5 MMHG
AV VALVE AREA BY VELOCITY RATIO: 3.24 CM²
AV VALVE AREA: 2.68 CM²
AV VELOCITY RATIO: 0.79
BSA FOR ECHO PROCEDURE: 2.16 M2
BUN SERPL-MCNC: 27 MG/DL (ref 8–23)
CALCIUM SERPL-MCNC: 9.5 MG/DL (ref 8.7–10.5)
CHLORIDE SERPL-SCNC: 97 MMOL/L (ref 95–110)
CO2 SERPL-SCNC: 28 MMOL/L (ref 23–29)
CREAT SERPL-MCNC: 1.7 MG/DL (ref 0.5–1.4)
CV ECHO LV RWT: 0.39 CM
DOP CALC AO PEAK VEL: 1.17 M/S
DOP CALC AO VTI: 21.74 CM
DOP CALC LVOT AREA: 4.1 CM2
DOP CALC LVOT DIAMETER: 2.28 CM
DOP CALC LVOT PEAK VEL: 0.93 M/S
DOP CALC LVOT STROKE VOLUME: 58.27 CM3
DOP CALCLVOT PEAK VEL VTI: 14.28 CM
E/E' RATIO: 12.11 M/S
ECHO LV POSTERIOR WALL: 1 CM (ref 0.6–1.1)
EST. GFR  (NO RACE VARIABLE): 43.9 ML/MIN/1.73 M^2
FRACTIONAL SHORTENING: 25 % (ref 28–44)
GLUCOSE SERPL-MCNC: 164 MG/DL (ref 70–110)
INFERIOR VENA CAVA SIZE SNIFF: 2.3 CM
INTERVENTRICULAR SEPTUM: 1 CM (ref 0.6–1.1)
LA MAJOR: 6.24 CM
LA MINOR: 6.23 CM
LA WIDTH: 4.76 CM
LEFT ATRIUM SIZE: 4.59 CM
LEFT ATRIUM VOLUME INDEX MOD: 49.3 ML/M2
LEFT ATRIUM VOLUME INDEX: 53.9 ML/M2
LEFT ATRIUM VOLUME MOD: 106 CM3
LEFT ATRIUM VOLUME: 115.79 CM3
LEFT INTERNAL DIMENSION IN SYSTOLE: 3.81 CM (ref 2.1–4)
LEFT VENTRICLE DIASTOLIC VOLUME INDEX: 72.8 ML/M2
LEFT VENTRICLE DIASTOLIC VOLUME: 156.51 ML
LEFT VENTRICLE MASS INDEX: 87 G/M2
LEFT VENTRICLE SYSTOLIC VOLUME INDEX: 29 ML/M2
LEFT VENTRICLE SYSTOLIC VOLUME: 62.3 ML
LEFT VENTRICULAR INTERNAL DIMENSION IN DIASTOLE: 5.1 CM (ref 3.5–6)
LEFT VENTRICULAR MASS: 188.02 G
LV LATERAL E/E' RATIO: 9.08 M/S
LV SEPTAL E/E' RATIO: 18.17 M/S
MAGNESIUM SERPL-MCNC: 1.3 MG/DL (ref 1.6–2.6)
MV PEAK E VEL: 1.09 M/S
OHS CV RV/LV RATIO: 0.74 CM
PHOSPHATE SERPL-MCNC: 3.9 MG/DL (ref 2.7–4.5)
PISA TR MAX VEL: 2.92 M/S
POCT GLUCOSE: 167 MG/DL (ref 70–110)
POCT GLUCOSE: 201 MG/DL (ref 70–110)
POTASSIUM SERPL-SCNC: 3.7 MMOL/L (ref 3.5–5.1)
RA MAJOR: 5.67 CM
RA PRESSURE ESTIMATED: 8 MMHG
RA WIDTH: 4.36 CM
RIGHT ATRIAL AREA: 21 CM2
RIGHT ATRIUM VOLUME AREA LENGTH APICAL 4 CHAMBER: 53 ML
RIGHT VENTRICULAR END-DIASTOLIC DIMENSION: 3.79 CM
RV TB RVSP: 11 MMHG
SINUS: 3.69 CM
SODIUM SERPL-SCNC: 136 MMOL/L (ref 136–145)
STJ: 2.64 CM
TACROLIMUS BLD-MCNC: 8.3 NG/ML (ref 5–15)
TACROLIMUS BLD-MCNC: 8.9 NG/ML (ref 5–15)
TDI LATERAL: 0.12 M/S
TDI SEPTAL: 0.06 M/S
TDI: 0.09 M/S
TR MAX PG: 34 MMHG
TRICUSPID ANNULAR PLANE SYSTOLIC EXCURSION: 1.22 CM
TV REST PULMONARY ARTERY PRESSURE: 42 MMHG
Z-SCORE OF LEFT VENTRICULAR DIMENSION IN END DIASTOLE: -3.14
Z-SCORE OF LEFT VENTRICULAR DIMENSION IN END SYSTOLE: -0.87

## 2024-05-03 PROCEDURE — 83735 ASSAY OF MAGNESIUM: CPT | Mod: HCNC | Performed by: STUDENT IN AN ORGANIZED HEALTH CARE EDUCATION/TRAINING PROGRAM

## 2024-05-03 PROCEDURE — 96366 THER/PROPH/DIAG IV INF ADDON: CPT

## 2024-05-03 PROCEDURE — 84100 ASSAY OF PHOSPHORUS: CPT | Mod: HCNC | Performed by: INTERNAL MEDICINE

## 2024-05-03 PROCEDURE — 96365 THER/PROPH/DIAG IV INF INIT: CPT | Mod: 59

## 2024-05-03 PROCEDURE — 96372 THER/PROPH/DIAG INJ SC/IM: CPT | Mod: 59 | Performed by: STUDENT IN AN ORGANIZED HEALTH CARE EDUCATION/TRAINING PROGRAM

## 2024-05-03 PROCEDURE — 25000003 PHARM REV CODE 250: Mod: HCNC | Performed by: STUDENT IN AN ORGANIZED HEALTH CARE EDUCATION/TRAINING PROGRAM

## 2024-05-03 PROCEDURE — 80197 ASSAY OF TACROLIMUS: CPT | Mod: HCNC | Performed by: INTERNAL MEDICINE

## 2024-05-03 PROCEDURE — 80048 BASIC METABOLIC PNL TOTAL CA: CPT | Mod: HCNC | Performed by: PHYSICIAN ASSISTANT

## 2024-05-03 PROCEDURE — 25000003 PHARM REV CODE 250: Mod: HCNC | Performed by: PHYSICIAN ASSISTANT

## 2024-05-03 PROCEDURE — 36415 COLL VENOUS BLD VENIPUNCTURE: CPT | Mod: HCNC | Performed by: PHYSICIAN ASSISTANT

## 2024-05-03 PROCEDURE — G0378 HOSPITAL OBSERVATION PER HR: HCPCS | Mod: HCNC

## 2024-05-03 PROCEDURE — 63600175 PHARM REV CODE 636 W HCPCS: Mod: HCNC | Performed by: STUDENT IN AN ORGANIZED HEALTH CARE EDUCATION/TRAINING PROGRAM

## 2024-05-03 PROCEDURE — 63600175 PHARM REV CODE 636 W HCPCS: Mod: HCNC | Performed by: PHYSICIAN ASSISTANT

## 2024-05-03 PROCEDURE — 36415 COLL VENOUS BLD VENIPUNCTURE: CPT | Mod: HCNC | Performed by: STUDENT IN AN ORGANIZED HEALTH CARE EDUCATION/TRAINING PROGRAM

## 2024-05-03 RX ORDER — SPIRONOLACTONE 25 MG/1
25 TABLET ORAL DAILY
Status: DISCONTINUED | OUTPATIENT
Start: 2024-05-03 | End: 2024-05-03 | Stop reason: HOSPADM

## 2024-05-03 RX ORDER — SPIRONOLACTONE 25 MG/1
50 TABLET ORAL DAILY
Status: DISCONTINUED | OUTPATIENT
Start: 2024-05-03 | End: 2024-05-03

## 2024-05-03 RX ORDER — MECLIZINE HCL 12.5 MG 12.5 MG/1
25 TABLET ORAL 3 TIMES DAILY PRN
Status: DISCONTINUED | OUTPATIENT
Start: 2024-05-03 | End: 2024-05-03 | Stop reason: HOSPADM

## 2024-05-03 RX ORDER — INSULIN ASPART 100 [IU]/ML
4 INJECTION, SOLUTION INTRAVENOUS; SUBCUTANEOUS
Status: DISCONTINUED | OUTPATIENT
Start: 2024-05-03 | End: 2024-05-03 | Stop reason: HOSPADM

## 2024-05-03 RX ORDER — CARVEDILOL 25 MG/1
50 TABLET ORAL 2 TIMES DAILY
Qty: 240 TABLET | Refills: 0 | Status: SHIPPED | OUTPATIENT
Start: 2024-05-03 | End: 2024-05-21 | Stop reason: SDUPTHER

## 2024-05-03 RX ORDER — VALSARTAN 160 MG/1
320 TABLET ORAL DAILY
Status: DISCONTINUED | OUTPATIENT
Start: 2024-05-03 | End: 2024-05-03 | Stop reason: HOSPADM

## 2024-05-03 RX ORDER — MAGNESIUM SULFATE HEPTAHYDRATE 40 MG/ML
2 INJECTION, SOLUTION INTRAVENOUS ONCE
Status: COMPLETED | OUTPATIENT
Start: 2024-05-03 | End: 2024-05-03

## 2024-05-03 RX ORDER — SPIRONOLACTONE 25 MG/1
25 TABLET ORAL DAILY
Qty: 60 TABLET | Refills: 0 | Status: SHIPPED | OUTPATIENT
Start: 2024-05-03 | End: 2024-05-21

## 2024-05-03 RX ORDER — VALSARTAN 320 MG/1
320 TABLET ORAL DAILY
Qty: 60 TABLET | Refills: 0 | Status: SHIPPED | OUTPATIENT
Start: 2024-05-04 | End: 2024-07-03

## 2024-05-03 RX ADMIN — INSULIN DETEMIR 10 UNITS: 100 INJECTION, SOLUTION SUBCUTANEOUS at 09:05

## 2024-05-03 RX ADMIN — PREDNISONE 5 MG: 5 TABLET ORAL at 09:05

## 2024-05-03 RX ADMIN — MYCOPHENOLATE MOFETIL 1000 MG: 250 CAPSULE ORAL at 09:05

## 2024-05-03 RX ADMIN — ATORVASTATIN CALCIUM 40 MG: 40 TABLET, FILM COATED ORAL at 09:05

## 2024-05-03 RX ADMIN — INSULIN ASPART 4 UNITS: 100 INJECTION, SOLUTION INTRAVENOUS; SUBCUTANEOUS at 11:05

## 2024-05-03 RX ADMIN — SPIRONOLACTONE 25 MG: 25 TABLET ORAL at 09:05

## 2024-05-03 RX ADMIN — CARVEDILOL 50 MG: 25 TABLET, FILM COATED ORAL at 09:05

## 2024-05-03 RX ADMIN — MECLIZINE 25 MG: 12.5 TABLET ORAL at 09:05

## 2024-05-03 RX ADMIN — TACROLIMUS 1 MG: 1 CAPSULE ORAL at 09:05

## 2024-05-03 RX ADMIN — INSULIN ASPART 2 UNITS: 100 INJECTION, SOLUTION INTRAVENOUS; SUBCUTANEOUS at 11:05

## 2024-05-03 RX ADMIN — ASPIRIN 81 MG CHEWABLE TABLET 81 MG: 81 TABLET CHEWABLE at 09:05

## 2024-05-03 RX ADMIN — GABAPENTIN 300 MG: 300 CAPSULE ORAL at 09:05

## 2024-05-03 RX ADMIN — MAGNESIUM SULFATE HEPTAHYDRATE 2 G: 40 INJECTION, SOLUTION INTRAVENOUS at 09:05

## 2024-05-03 RX ADMIN — VALSARTAN 320 MG: 160 TABLET, FILM COATED ORAL at 09:05

## 2024-05-03 RX ADMIN — APIXABAN 5 MG: 5 TABLET, FILM COATED ORAL at 09:05

## 2024-05-03 NOTE — PLAN OF CARE
Problem: Adult Inpatient Plan of Care  Goal: Plan of Care Review  Outcome: Progressing  Goal: Patient-Specific Goal (Individualized)  Outcome: Progressing  Goal: Absence of Hospital-Acquired Illness or Injury  Outcome: Progressing  Goal: Optimal Comfort and Wellbeing  Outcome: Progressing  Goal: Readiness for Transition of Care  Outcome: Progressing     Problem: Diabetes Comorbidity  Goal: Blood Glucose Level Within Targeted Range  Outcome: Progressing     Problem: Comorbidity Management  Goal: Maintenance of Heart Failure Symptom Control  Outcome: Progressing  Goal: Blood Pressure in Desired Range  Outcome: Progressing

## 2024-05-03 NOTE — PLAN OF CARE
Problem: Adult Inpatient Plan of Care  Goal: Plan of Care Review  Outcome: Met  Goal: Patient-Specific Goal (Individualized)  Outcome: Met  Goal: Absence of Hospital-Acquired Illness or Injury  Outcome: Met  Goal: Optimal Comfort and Wellbeing  Outcome: Met  Goal: Readiness for Transition of Care  Outcome: Met     Problem: Diabetes Comorbidity  Goal: Blood Glucose Level Within Targeted Range  Outcome: Met     Problem: Comorbidity Management  Goal: Maintenance of Heart Failure Symptom Control  Outcome: Met  Goal: Blood Pressure in Desired Range  Outcome: Met   Patient ready for discharge. Discharge instructions given, and questions answered. No distress noted. Patient expresses understanding.

## 2024-05-03 NOTE — CONSULTS
Food & Nutrition  Education     Diet Education: Fluid and Salt restriction   Time Spent: 20 minutes   Learners: Patient and caregiver     Handouts provided: Low Sodium Nutrition Therapy, Fluid-Restricted Nutrition Therapy      Comments: Discussed importance of a low sodium diet. Reviewed high sodium foods that should be avoided. Food labels, salt free seasonings, and recommended sodium intake reviewed. Encouraged healthy, fresh foods that are low in sodium that are good for consumption. Fluid intake and conversions discussed. Foods considered fluids were reviewed and encouraged to monitor. Discussed making smaller changes till their nutritional goal is met. Briefly discussed CHO serving sizes with fruits and grains. General healthy diet encouraged. All questions/concerns were addressed.     Follow-Up: Yes     Please Re-consult as needed.   Thanks!    Shruti Rios, MS, RD, LDN

## 2024-05-03 NOTE — PROGRESS NOTES
"[facility]) nurse navigator notified of HFTCC candidate in need of education and introduction to 4-6 week program.      PT aao x 3 while lying in bed  with wife at bedside. Introduced self to pt as HFT[facility] nurse navigator.     Patient given "[facility] Home Care Guide" which includes "Daily weight and symptom tracker".  Encouraged pt and wife to review information.      Reviewed the following key points of HFT[facility] program with pt:              1.) Take your medications as directed. Call Ms Le if any health Care Professional changes your Heart/Fluid medications.               2.) Weight yourself daily. Daily Dry Weights. Upon waking ,empty your bladder, weigh with as little clothes as possible before eating/drinking. Record weight in Symptom Tracker and compare these weights for fluid gain. Weight gain overnight of 3-4 lbs is Fluid, also a gain of 5 lbs in 3 days is Fluid as well. Call US!              3.) Follow low salt and limited fluid diet. Salt/Sodium Restriction 4424-4983 mg, see page 4. Sodium makes you hold onto Fluid. High Sodium Foods;Deli Meat/Cheese, Sausages/Hot Dogs, Fast Food/Restaurant Food, Anything in a box, bottle or can. Cook with Fresh or Frozen ingredients and use seasonings that are labeled NO SALT. Check Portion Sizes, Salt is reported for 1 portion. A can may contain 2-3 portions. Fluid Restriction 1.5 -2 Liters/Day, see page 6, measure all of your Fluid, the milk in your cereal, broth in your soup and ice cream because anything that melts at room temp is a liquid.              4.) Stop smoking and start exercising. Brisk walking is good, don't walk like you are going to the Hang[employer] and DON"T WALK IN THE HEAT! Start low and increase as tolerated. Remember if you don't use it you lose it.              5.) Go to your appointments and call your team. Have your weight and BP/P ready when we call to do the Phone Check ins and call us if " you have fluid gain or questions         Pt reminded to follow Symptom tracker and to call at the onset of symptoms according to tracker.     Reviewed plan for follow up once discharged to include phone calls, in person and virtual visits to assist pt optimizing their heart failure medication regimen and encouraging healthy lifestyle modifications.  Reminded pt that program will assist them over the next 4-6 weeks and then patient will be transferred to long term care provider .  Reminded pt how to contact HFT[facility] navigator via phone and or via [employer].     Pt instructed appointment will be printed on hospital discharge paperwork.     Pt also reminded RN will call 48-72 hours after discharge to check on them.     PT and family verbalize read back of some of the information given.  Encouraged pt and family to read over information often and contact team with any questions or concerns.     PT requests early AM appts 0830 lab and 0900 appt but not on Monday or Friday.

## 2024-05-03 NOTE — NURSING
Patient AAOX4. No distress noted. Skin intact Patient complains of motion sickness/vertigo. MD notified. Bilateral edema present +1 in ankles. Bed in the lowest position.Call light within reach.

## 2024-05-03 NOTE — PLAN OF CARE
[facility]  Discharge Final Note    Primary Care Provider: Mima Mack MD    Expected Discharge Date: 5/3/2024    Final Discharge Note (most recent)       Final Note - 05/03/24 1620          Final Note    Assessment Type Final Discharge Note (P)      Anticipated Discharge Disposition Home or Self Care (P)      What phone number can be called within the next 1-3 days to see how you are doing after discharge? [phone] (P)      Hospital Resources/Appts/Education Provided Provided patient/caregiver with written discharge plan information;Appointments scheduled and added to AVS;Provided education on problems/symptoms using teachback (P)         Post-Acute Status    Post-Acute Authorization Other (P)      Coverage [employer] (P)      Other Status No Post-Acute Service Needs (P)      Discharge Delays None known at this time (P)                      Important Message from Medicare             SW noting pt's discharge orders. After review of pt's medical record, no discharge needs identified at this time.     Blake Vargas LMSW

## 2024-05-03 NOTE — DISCHARGE SUMMARY
[facility] [address] [place] [address] Medicine  Discharge Summary      Patient Name: Ravi Zamorano  MRN: [id number]  MARISOL: [id number]  Patient Class: OP- Observation  Admission Date: 5/2/2024  Hospital Length of Stay: 0 days  Discharge Date and Time:  05/03/2024 12:05 PM  Attending Physician: Pedro Cummins DO   Discharging Provider: Pedro Cummins DO  Primary Care Provider: Mima Mack MD  [facility] Medicine Team: [facility] MED  Pedro Cummins DO  Primary Care Team: Health system    HPI:   Mr Zamorano is a 68 y.o. male with a PMH of ESRD s/p kidney transplant 2016, AF on eliquis, T2DM, HCV s/p harvoni, HTN, HLD, CHF (LV diastolic dysfunction, pEF), and pancreatitis who presents with worsening SOB and LE edema X 1 week. Yesterday he experienced orthopnea, inability to lay flat, and worsening shortness of breath so he presented to the ED. Patient was taken off spironolactone in March and taken off HCTZ at the beginning of April. He called his PCP and restarted both medications 3 days ago without improvement. He does not follow a strict low salt diet, but denies any recent high salt meals. Patient denies palpitations, chest pain, cough, fever, chills, abdominal pain, N/V, urinary symptoms, light headedness, headache, weakness. His pressure at home have been running high. He is compliant with home medications. He needs to schedule an ablation with Dr Connor for his Afib. No tobacco use.     In ED, /96, HR 95.92% on RA. Afebrile without leukocytosis. CXR with left sided pleural effusion. BNP >4,900. Troponin 0.049. Hgb 7.9 (at baseline for April). Na 131. Cr 1.5, baseline. Given Lasix 40 mg IV X 1 and admitted to observation for CHF exacerbation.     * No surgery found *      Hospital Course:   Admitted due to dyspnea and lower extremity edema and concern for CHF exacerbation.  Required brief IV diuresis with improvement.  Of note, BP has been uncontrolled at home and during the hospital possibly  contributing to volume overload.  Changes were made to his medications including up titration of valsartan and Coreg, HCTZ was discontinued.  TTE was obtained.  Was in atrial fibrillation during study, which showed EF 50-55%. Unable to assess diastolic function due to atrial fibrillation.  Regarding AF, has appt with EP physician on 5/6. Ablation apparently planned in the near future.      Physical Exam  Gen: in NAD, appears stated age  Neuro: AAOx3, motor, sensory, and strength grossly intact BL  HEENT: NTNC, EOMI, PERRL, MMM  CVS: RRR, no m/r/g; S1/S2 auscultated with no S3 or S4; capillary refill < 2 sec  Resp: lungs CTAB, no w/r/r; no belabored breathing or accessory muscle use appreciated   Abd: BS+ in all 4 quadrants; NTND, soft to palpation; no organomegaly appreciated   Extrem: pulses full, equal, and regular over all 4 extremities; no UE or LE edema BL    Goals of Care Treatment Preferences:  Code Status: Full Code      Consults:   Consults (From admission, onward)          Status Ordering Provider     Inpatient consult to Kidney/Pancreas Transplant Medicine  Once        Provider:  (Not yet assigned)    Acknowledged CATARINO MOREL     Inpatient consult to Social Work/Case Management  Once        Provider:  (Not yet assigned)    Acknowledged CATARINO MOREL     Inpatient consult to Registered Dietitian/Nutritionist  Once        Provider:  (Not yet assigned)    Acknowledged CATARINO MOREL            No new Assessment & Plan notes have been filed under this hospital service since the last note was generated.  Service: Hospital Medicine    Final Active Diagnoses:    Diagnosis Date Noted POA    Symptomatic anemia [D64.9] 04/02/2024 Yes    Persistent atrial fibrillation [I48.19] 08/09/2019 Yes    Stage 3 chronic kidney disease [N18.30] 12/28/2016 Yes    Immunocompromised state due to drug therapy [D84.821, Z79.899] 11/07/2016 Not Applicable    S/P cadaveric kidney transplant 11/5/2016. ESRD secondary  to HTN/DMII [Z94.0] 11/05/2016 Not Applicable    Type 2 diabetes mellitus with stage 3a chronic kidney disease, with long-term current use of insulin [E11.22, N18.31, Z79.4] 07/29/2014 Not Applicable    Hypertension since 2000 [I10] 07/29/2014 Yes      Problems Resolved During this Admission:    Diagnosis Date Noted Date Resolved POA    PRINCIPAL PROBLEM:  Acute on chronic diastolic congestive heart failure [I50.33] 10/24/2014 05/03/2024 Yes       Discharged Condition: good    Disposition: Home or Self Care    Follow Up:    Patient Instructions:      Notify your health care provider if you experience any of the following:     Notify your health care provider if you experience any of the following:  increased confusion or weakness     Notify your health care provider if you experience any of the following:  persistent dizziness, light-headedness, or visual disturbances     Notify your health care provider if you experience any of the following:  worsening rash     Notify your health care provider if you experience any of the following:  severe persistent headache     Notify your health care provider if you experience any of the following:  difficulty breathing or increased cough     Notify your health care provider if you experience any of the following:  redness, tenderness, or signs of infection (pain, swelling, redness, odor or green/yellow discharge around incision site)     Notify your health care provider if you experience any of the following:  severe uncontrolled pain     Notify your health care provider if you experience any of the following:  persistent nausea and vomiting or diarrhea     Notify your health care provider if you experience any of the following:  temperature >100.4       Significant Diagnostic Studies: Labs: BMP:   Recent Labs   Lab 05/02/24  0730 05/02/24  1354 05/03/24  0433 05/03/24  0909   *  --  164*  --    *  --  136  --    K 3.6  --  3.7  --    CL 99  --  97  --    CO2 23   "--  28  --    BUN 22  --  27*  --    CREATININE 1.5*  --  1.7*  --    CALCIUM 8.8  --  9.5  --    MG  --  1.4*  --  1.3*       Pending Diagnostic Studies:       Procedure Component Value Units Date/Time    Methylmalonic acid, serum [[id number]] Collected: 05/02/24 1350    Order Status: Sent Lab Status: In process Updated: 05/02/24 2318    Specimen: Blood            Medications:  Reconciled Home Medications:      Medication List        CHANGE how you take these medications      carvediloL 25 MG tablet  Commonly known as: COREG  Take 2 tablets (50 mg total) by mouth 2 (two) times daily.  What changed: how much to take     valsartan 320 MG tablet  Commonly known as: DIOVAN  Take 1 tablet (320 mg total) by mouth once daily.  Start taking on: May 4, 2024  What changed:   medication strength  how much to take            CONTINUE taking these medications      apixaban 5 mg Tab  Commonly known as: ELIQUIS  Take 1 tablet (5 mg total) by mouth 2 (two) times daily.     aspirin 81 MG Chew  Take 81 mg by mouth once daily.     atorvastatin 40 MG tablet  Commonly known as: LIPITOR  Take 1 tablet (40 mg total) by mouth once daily.     * BD ULTRA-FINE LO PEN NEEDLE 32 gauge x 5/32" Ndle  Generic drug: pen needle, diabetic  USE TO ADMINISTER INSULIN 4 TIMES DAILY.     * BD ULTRA-FINE LO PEN NEEDLE 32 gauge x 5/32" Ndle  Generic drug: pen needle, diabetic  use four times a day     blood-glucose sensor Kayla  Gin 3, use as directed, change every 14 days , e 11.65     famotidine 20 MG tablet  Commonly known as: PEPCID  Take 1 tablet (20 mg total) by mouth every evening.     gabapentin 300 MG capsule  Commonly known as: NEURONTIN  Take 1 capsule by mouth in the morning, 1 capsule midday, and 3 capsules at night.     insulin aspart U-100 100 unit/mL (3 mL) Inpn pen  Commonly known as: NovoLOG  Inject 16 units w/ breakfast, 10 units at lunch and dinner scale 180-230+2, 231-280+4, 281-330+6, 331-380+8, >380+10.  *Max daily 66 units.*   "   JARDIANCE 10 mg tablet  Generic drug: empagliflozin  Take 1 tablet (10 mg total) by mouth once daily.     LANTUS SOLOSTAR U-100 INSULIN glargine 100 units/mL SubQ pen  Generic drug: insulin  Inject 20 Units into the skin every morning.     meclizine 25 mg tablet  Commonly known as: ANTIVERT  Take 1 tablet (25 mg total) by mouth 3 (three) times daily as needed for Dizziness.     mycophenolate 250 mg Cap  Commonly known as: CELLCEPT  Take 4 capsules (1,000 mg total) by mouth 2 (two) times daily. HOLD cellcept until Monday (4/8)     pantoprazole 40 MG tablet  Commonly known as: PROTONIX  Take 1 tablet (40 mg total) by mouth 2 (two) times a day. for 14 days     predniSONE 5 MG tablet  Commonly known as: DELTASONE  Take 1 tablet (5 mg total) by mouth once daily.     spironolactone 25 MG tablet  Commonly known as: ALDACTONE  Take 1 tablet (25 mg total) by mouth once daily.     tacrolimus 0.5 MG Cap  Commonly known as: PROGRAF  Take 2 capsules (1 mg total) by mouth every 12 (twelve) hours.           * This list has 2 medication(s) that are the same as other medications prescribed for you. Read the directions carefully, and ask your doctor or other care provider to review them with you.                STOP taking these medications      hydroCHLOROthiazide 25 MG tablet  Commonly known as: HYDRODIURIL              Indwelling Lines/Drains at time of discharge:   Lines/Drains/Airways       None                   Time spent on the discharge of patient: 40 minutes     Pt deemed appropriate for discharge. Plan discussed with pt, who was agreeable and amenable; medications were discussed and reviewed, outpatient follow-up scheduled, ER precautions were given, all questions were answered to the pt's satisfaction, and Mr Zamorano was subsequently discharged.      Pedro Cummins DO  Department of Hospital Medicine  [facility]

## 2024-05-03 NOTE — HOSPITAL COURSE
Admitted due to dyspnea and lower extremity edema and concern for CHF exacerbation.  Required brief IV diuresis with improvement.  Of note, BP has been uncontrolled at home and during the hospital possibly contributing to volume overload.  Changes were made to his medications including up titration of valsartan and Coreg, HCTZ was discontinued.  TTE was obtained.  Was in atrial fibrillation during study, which showed EF 50-55%. Unable to assess diastolic function due to atrial fibrillation.  Regarding AF, has appt with EP physician on 5/6. Ablation apparently planned in the near future.

## 2024-05-03 NOTE — PLAN OF CARE
[facility]  Initial Discharge Assessment       Primary Care Provider: Mima Mack MD    Admission Diagnosis: Shortness of breath [R06.02]  Acute exacerbation of CHF (congestive heart failure) [I50.9]  CHF exacerbation [I50.9]  Chest pain [R07.9]    Admission Date: 5/2/2024  Expected Discharge Date: 5/3/2024    Transition of Care Barriers: (P) None    Payor: [employer] MANAGED MEDICARE / Plan: [employer] MEDICARE HMO / Product Type: Capitation /     Extended Emergency Contact Information  Primary Emergency Contact: Erika Zamorano  Address: [address] [place] [address] [facility]  Home Phone: [phone]  Mobile Phone: [phone]  Relation: Spouse    Discharge Plan A: (P) Home with family  Discharge Plan B: (P) Home      [facility]  [address] [place] [address]  Phone: [phone] Fax: [phone]    [facility][id number] [address] AT [address] [address]  Phone: [phone] Fax: [phone]      Initial Assessment (most recent)       Adult Discharge Assessment - 05/03/24 1158          Discharge Assessment    Assessment Type Discharge Planning Assessment (P)      Confirmed/corrected address, phone number and insurance Yes (P)      Confirmed Demographics Correct on Facesheet (P)      Source of Information patient (P)      Does patient/caregiver understand observation status Yes (P)      Communicated UBALDO with patient/caregiver Yes (P)      Reason For Admission Heart Failure (P)      People in Home spouse (P)      Do you expect to return to your current living situation? Yes (P)      Do you have help at home or someone to help you manage your care at home? Yes (P)      Who are your caregiver(s) and their phone number(s)? SheilaErika Jaun (c) [phone] (P)      Prior to hospitilization cognitive status: Alert/Oriented (P)       Current cognitive status: Alert/Oriented (P)      Walking or Climbing Stairs Difficulty no (P)      Equipment Currently Used at Home none (P)      Readmission within 30 days? Yes (P)      Patient currently being followed by outpatient case management? No (P)      Do you currently have service(s) that help you manage your care at home? No (P)      Do you take prescription medications? Yes (P)      Do you have prescription coverage? Yes (P)      Coverage [employer] Medicare (P)      Do you have any problems affording any of your prescribed medications? No (P)      Is the patient taking medications as prescribed? yes (P)      Who is going to help you get home at discharge? Family (P)      How do you get to doctors appointments? car, drives self (P)      Are you on dialysis? No (P)      Do you take coumadin? No (P)      Discharge Plan A Home with family (P)      Discharge Plan B Home (P)      DME Needed Upon Discharge  none (P)      Discharge Plan discussed with: Patient;Sibling (P)      Name(s) and Number(s) SisterRicha (P)      Transition of Care Barriers None (P)         Physical Activity    On average, how many days per week do you engage in moderate to strenuous exercise (like a brisk walk)? 0 days (P)      On average, how many minutes do you engage in exercise at this level? 0 min (P)         Financial Resource Strain    How hard is it for you to pay for the very basics like food, housing, medical care, and heating? Not hard at all (P)         Housing Stability    In the last 12 months, was there a time when you were not able to pay the mortgage or rent on time? No (P)      At any time in the past 12 months, were you homeless or living in a shelter (including now)? No (P)         Transportation Needs    In the past 12 months, has lack of transportation kept you from medical appointments or from getting medications? No (P)      In the past 12 months, has lack of transportation kept you from meetings, work, or from  getting things needed for daily living? No (P)         Food Insecurity    Within the past 12 months, you worried that your food would run out before you got the money to buy more. Never true (P)      Within the past 12 months, the food you bought just didn't last and you didn't have money to get more. Never true (P)         Stress    Do you feel stress - tense, restless, nervous, or anxious, or unable to sleep at night because your mind is troubled all the time - these days? Not at all (P)         Social Connections    In a typical week, how many times do you talk on the phone with family, friends, or neighbors? More than three times a week (P)      How often do you get together with friends or relatives? Never (P)      How often do you attend Catholic or Gnosticism services? Never (P)      Do you belong to any clubs or organizations such as Catholic groups, unions, fraternal or athletic groups, or school groups? No (P)      How often do you attend meetings of the clubs or organizations you belong to? Never (P)      Are you , , , , never , or living with a partner?  (P)         Alcohol Use    Q1: How often do you have a drink containing alcohol? 4 or more times a week (P)      Q2: How many drinks containing alcohol do you have on a typical day when you are drinking? 1 or 2 (P)      Q3: How often do you have six or more drinks on one occasion? Never (P)         OTHER    Name(s) of People in Home Spouse (P)                    SW met with pt and sister, Richa at bedside to complete Initial Discharge Planning Assessment. Pt lives with spouseErika in their home in [place], [place]. The home is single story with no steps/stairs to enter. No DME, Home Health, Dialysis or Coumadin. Pt. drives, but family will provide transportation home upon discharge.      Discharge Plan A and Plan B have been determined by review of patient's clinical status, future medical and therapeutic needs,  and coverage/benefits for post-acute care in coordination with multidisciplinary team members.     Blake Vargas LMSW

## 2024-05-06 ENCOUNTER — OFFICE VISIT (OUTPATIENT)
Dept: ELECTROPHYSIOLOGY | Facility: CLINIC | Age: 69
End: 2024-05-06
Payer: MEDICARE

## 2024-05-06 VITALS
HEIGHT: 73 IN | SYSTOLIC BLOOD PRESSURE: 147 MMHG | WEIGHT: 192.25 LBS | HEART RATE: 121 BPM | DIASTOLIC BLOOD PRESSURE: 96 MMHG | BODY MASS INDEX: 25.48 KG/M2

## 2024-05-06 DIAGNOSIS — N18.31 TYPE 2 DIABETES MELLITUS WITH STAGE 3A CHRONIC KIDNEY DISEASE, WITH LONG-TERM CURRENT USE OF INSULIN: ICD-10-CM

## 2024-05-06 DIAGNOSIS — D64.9 SYMPTOMATIC ANEMIA: ICD-10-CM

## 2024-05-06 DIAGNOSIS — Z79.01 ANTICOAGULANT LONG-TERM USE: ICD-10-CM

## 2024-05-06 DIAGNOSIS — I48.19 PERSISTENT ATRIAL FIBRILLATION: Primary | ICD-10-CM

## 2024-05-06 DIAGNOSIS — Z79.4 TYPE 2 DIABETES MELLITUS WITH STAGE 3A CHRONIC KIDNEY DISEASE, WITH LONG-TERM CURRENT USE OF INSULIN: ICD-10-CM

## 2024-05-06 DIAGNOSIS — E78.2 MIXED HYPERLIPIDEMIA: ICD-10-CM

## 2024-05-06 DIAGNOSIS — D69.6 THROMBOCYTOPENIA, UNSPECIFIED: ICD-10-CM

## 2024-05-06 DIAGNOSIS — Z94.0 S/P KIDNEY TRANSPLANT: ICD-10-CM

## 2024-05-06 DIAGNOSIS — I48.0 PAROXYSMAL A-FIB: ICD-10-CM

## 2024-05-06 DIAGNOSIS — E11.22 TYPE 2 DIABETES MELLITUS WITH STAGE 3A CHRONIC KIDNEY DISEASE, WITH LONG-TERM CURRENT USE OF INSULIN: ICD-10-CM

## 2024-05-06 DIAGNOSIS — I50.32 CHRONIC DIASTOLIC CONGESTIVE HEART FAILURE: ICD-10-CM

## 2024-05-06 PROCEDURE — 99999 PR PBB SHADOW E&M-EST. PATIENT-LVL IV: CPT | Mod: PBBFAC,HCNC,, | Performed by: INTERNAL MEDICINE

## 2024-05-06 PROCEDURE — 1101F PT FALLS ASSESS-DOCD LE1/YR: CPT | Mod: HCNC,CPTII,S$GLB, | Performed by: INTERNAL MEDICINE

## 2024-05-06 PROCEDURE — 1126F AMNT PAIN NOTED NONE PRSNT: CPT | Mod: HCNC,CPTII,S$GLB, | Performed by: INTERNAL MEDICINE

## 2024-05-06 PROCEDURE — 3288F FALL RISK ASSESSMENT DOCD: CPT | Mod: HCNC,CPTII,S$GLB, | Performed by: INTERNAL MEDICINE

## 2024-05-06 PROCEDURE — 3080F DIAST BP >= 90 MM HG: CPT | Mod: HCNC,CPTII,S$GLB, | Performed by: INTERNAL MEDICINE

## 2024-05-06 PROCEDURE — 3077F SYST BP >= 140 MM HG: CPT | Mod: HCNC,CPTII,S$GLB, | Performed by: INTERNAL MEDICINE

## 2024-05-06 PROCEDURE — 99215 OFFICE O/P EST HI 40 MIN: CPT | Mod: HCNC,S$GLB,, | Performed by: INTERNAL MEDICINE

## 2024-05-06 PROCEDURE — 3008F BODY MASS INDEX DOCD: CPT | Mod: HCNC,CPTII,S$GLB, | Performed by: INTERNAL MEDICINE

## 2024-05-06 PROCEDURE — 4010F ACE/ARB THERAPY RXD/TAKEN: CPT | Mod: HCNC,CPTII,S$GLB, | Performed by: INTERNAL MEDICINE

## 2024-05-06 PROCEDURE — 1159F MED LIST DOCD IN RCRD: CPT | Mod: HCNC,CPTII,S$GLB, | Performed by: INTERNAL MEDICINE

## 2024-05-06 PROCEDURE — 3044F HG A1C LEVEL LT 7.0%: CPT | Mod: HCNC,CPTII,S$GLB, | Performed by: INTERNAL MEDICINE

## 2024-05-07 ENCOUNTER — LAB VISIT (OUTPATIENT)
Dept: LAB | Facility: HOSPITAL | Age: 69
End: 2024-05-07
Payer: MEDICARE

## 2024-05-07 DIAGNOSIS — Z94.0 KIDNEY REPLACED BY TRANSPLANT: ICD-10-CM

## 2024-05-07 LAB
METHYLMALONATE SERPL-SCNC: 0.37 UMOL/L
TACROLIMUS BLD-MCNC: 14.1 NG/ML (ref 5–15)

## 2024-05-07 PROCEDURE — 80197 ASSAY OF TACROLIMUS: CPT | Mod: HCNC | Performed by: INTERNAL MEDICINE

## 2024-05-07 PROCEDURE — 36415 COLL VENOUS BLD VENIPUNCTURE: CPT | Mod: HCNC | Performed by: INTERNAL MEDICINE

## 2024-05-13 ENCOUNTER — OFFICE VISIT (OUTPATIENT)
Dept: OPTOMETRY | Facility: CLINIC | Age: 69
End: 2024-05-13
Payer: MEDICARE

## 2024-05-13 DIAGNOSIS — Z98.42 STATUS POST CATARACT EXTRACTION OF BOTH EYES WITH INSERTION OF INTRAOCULAR LENS: Primary | ICD-10-CM

## 2024-05-13 DIAGNOSIS — H47.393 OPTIC NERVE CUPPING OF BOTH EYES: ICD-10-CM

## 2024-05-13 DIAGNOSIS — Z98.41 STATUS POST CATARACT EXTRACTION OF BOTH EYES WITH INSERTION OF INTRAOCULAR LENS: Primary | ICD-10-CM

## 2024-05-13 DIAGNOSIS — Z96.1 STATUS POST CATARACT EXTRACTION OF BOTH EYES WITH INSERTION OF INTRAOCULAR LENS: Primary | ICD-10-CM

## 2024-05-13 DIAGNOSIS — E11.3393 TYPE 2 DIABETES MELLITUS WITH BOTH EYES AFFECTED BY MODERATE NONPROLIFERATIVE RETINOPATHY WITHOUT MACULAR EDEMA, WITHOUT LONG-TERM CURRENT USE OF INSULIN: ICD-10-CM

## 2024-05-13 PROCEDURE — 1101F PT FALLS ASSESS-DOCD LE1/YR: CPT | Mod: CPTII,S$GLB,, | Performed by: OPTOMETRIST

## 2024-05-13 PROCEDURE — 3044F HG A1C LEVEL LT 7.0%: CPT | Mod: CPTII,S$GLB,, | Performed by: OPTOMETRIST

## 2024-05-13 PROCEDURE — 99999 PR PBB SHADOW E&M-EST. PATIENT-LVL III: CPT | Mod: PBBFAC,,, | Performed by: OPTOMETRIST

## 2024-05-13 PROCEDURE — 99024 POSTOP FOLLOW-UP VISIT: CPT | Mod: S$GLB,,, | Performed by: OPTOMETRIST

## 2024-05-13 PROCEDURE — 1126F AMNT PAIN NOTED NONE PRSNT: CPT | Mod: CPTII,S$GLB,, | Performed by: OPTOMETRIST

## 2024-05-13 PROCEDURE — 3288F FALL RISK ASSESSMENT DOCD: CPT | Mod: CPTII,S$GLB,, | Performed by: OPTOMETRIST

## 2024-05-13 PROCEDURE — 1159F MED LIST DOCD IN RCRD: CPT | Mod: CPTII,S$GLB,, | Performed by: OPTOMETRIST

## 2024-05-13 PROCEDURE — 4010F ACE/ARB THERAPY RXD/TAKEN: CPT | Mod: CPTII,S$GLB,, | Performed by: OPTOMETRIST

## 2024-05-13 NOTE — PROGRESS NOTES
HPI    Pt is here today for post op eval. Patient denies pain/discomfort today   but reports an occasionally sharp pain OU.   DLS: 4/11/2024 Dr. Palacio  (-)Flashes   (+)Floaters   (+)Diplopia   (-)Headaches   (-)Itching   (-)Tearing  (-)Burning  (+)Dryness   (-)Photophobia  (-)Glare   (+)Blurred VA  Past Eye Sx: Cataract with IOL OU   Eye Meds: Systane OU PRN  Last edited by Deepa Shelton, OD on 5/13/2024 10:23 AM.            Assessment /Plan     For exam results, see Encounter Report.    Status post cataract extraction of both eyes with insertion of intraocular lens    Type 2 diabetes mellitus with both eyes affected by moderate nonproliferative retinopathy without macular edema, without long-term current use of insulin    Optic nerve cupping of both eyes      1. Educated on today's WNL findings OU. Eyes well healed from cataract surgery OU. Pt OK to use OTC readers.     2. Educated pt on today's findings of Moderate NPDR OU and stressed continued control with primary care physician as pt states BG has been spiking and dropping. Pt due back to see Dr. Christensen for DFE with photos--will reach out to her staff to get pt scheduled.    3. 1/23/24 Dr. Christensen ordered Posterior Segment OCT Optic Nerve- Both eyes: WNL OU, borderline thin OU--Dr. Christensen to continue to monitor.

## 2024-05-14 ENCOUNTER — LAB VISIT (OUTPATIENT)
Dept: LAB | Facility: HOSPITAL | Age: 69
End: 2024-05-14
Payer: MEDICARE

## 2024-05-14 DIAGNOSIS — I48.0 PAROXYSMAL A-FIB: Primary | ICD-10-CM

## 2024-05-14 DIAGNOSIS — R06.02 SOB (SHORTNESS OF BREATH): ICD-10-CM

## 2024-05-14 LAB
ALBUMIN SERPL BCP-MCNC: 3.5 G/DL (ref 3.5–5.2)
ALP SERPL-CCNC: 49 U/L (ref 55–135)
ALT SERPL W/O P-5'-P-CCNC: <5 U/L (ref 10–44)
ANION GAP SERPL CALC-SCNC: 9 MMOL/L (ref 8–16)
AST SERPL-CCNC: 11 U/L (ref 10–40)
BASOPHILS # BLD AUTO: 0.02 K/UL (ref 0–0.2)
BASOPHILS NFR BLD: 0.4 % (ref 0–1.9)
BILIRUB SERPL-MCNC: 0.5 MG/DL (ref 0.1–1)
BNP SERPL-MCNC: 4444 PG/ML (ref 0–99)
BUN SERPL-MCNC: 26 MG/DL (ref 8–23)
CALCIUM SERPL-MCNC: 9.1 MG/DL (ref 8.7–10.5)
CHLORIDE SERPL-SCNC: 111 MMOL/L (ref 95–110)
CO2 SERPL-SCNC: 15 MMOL/L (ref 23–29)
CREAT SERPL-MCNC: 1.7 MG/DL (ref 0.5–1.4)
DIFFERENTIAL METHOD BLD: ABNORMAL
EOSINOPHIL # BLD AUTO: 0.1 K/UL (ref 0–0.5)
EOSINOPHIL NFR BLD: 0.9 % (ref 0–8)
ERYTHROCYTE [DISTWIDTH] IN BLOOD BY AUTOMATED COUNT: 16.3 % (ref 11.5–14.5)
EST. GFR  (NO RACE VARIABLE): 43.9 ML/MIN/1.73 M^2
GLUCOSE SERPL-MCNC: 111 MG/DL (ref 70–110)
HCT VFR BLD AUTO: 33.5 % (ref 40–54)
HGB BLD-MCNC: 8.8 G/DL (ref 14–18)
IMM GRANULOCYTES # BLD AUTO: 0.13 K/UL (ref 0–0.04)
IMM GRANULOCYTES NFR BLD AUTO: 2.4 % (ref 0–0.5)
LYMPHOCYTES # BLD AUTO: 0.6 K/UL (ref 1–4.8)
LYMPHOCYTES NFR BLD: 12 % (ref 18–48)
MAGNESIUM SERPL-MCNC: 1.5 MG/DL (ref 1.6–2.6)
MCH RBC QN AUTO: 23.7 PG (ref 27–31)
MCHC RBC AUTO-ENTMCNC: 26.3 G/DL (ref 32–36)
MCV RBC AUTO: 90 FL (ref 82–98)
MONOCYTES # BLD AUTO: 1.1 K/UL (ref 0.3–1)
MONOCYTES NFR BLD: 20 % (ref 4–15)
NEUTROPHILS # BLD AUTO: 3.4 K/UL (ref 1.8–7.7)
NEUTROPHILS NFR BLD: 64.3 % (ref 38–73)
NRBC BLD-RTO: 0 /100 WBC
PHOSPHATE SERPL-MCNC: 2.8 MG/DL (ref 2.7–4.5)
PLATELET # BLD AUTO: 161 K/UL (ref 150–450)
PMV BLD AUTO: ABNORMAL FL (ref 9.2–12.9)
POTASSIUM SERPL-SCNC: 5.5 MMOL/L (ref 3.5–5.1)
PROT SERPL-MCNC: 6.3 G/DL (ref 6–8.4)
RBC # BLD AUTO: 3.71 M/UL (ref 4.6–6.2)
SODIUM SERPL-SCNC: 135 MMOL/L (ref 136–145)
WBC # BLD AUTO: 5.35 K/UL (ref 3.9–12.7)

## 2024-05-14 PROCEDURE — 83880 ASSAY OF NATRIURETIC PEPTIDE: CPT | Mod: HCNC

## 2024-05-14 PROCEDURE — 36415 COLL VENOUS BLD VENIPUNCTURE: CPT | Mod: HCNC

## 2024-05-14 PROCEDURE — 83735 ASSAY OF MAGNESIUM: CPT | Mod: HCNC

## 2024-05-14 PROCEDURE — 85025 COMPLETE CBC W/AUTO DIFF WBC: CPT | Mod: HCNC

## 2024-05-14 PROCEDURE — 84100 ASSAY OF PHOSPHORUS: CPT | Mod: HCNC

## 2024-05-14 PROCEDURE — 80053 COMPREHEN METABOLIC PANEL: CPT | Mod: HCNC

## 2024-05-15 ENCOUNTER — TELEPHONE (OUTPATIENT)
Dept: OPTOMETRY | Facility: CLINIC | Age: 69
End: 2024-05-15
Payer: MEDICARE

## 2024-05-15 ENCOUNTER — TELEPHONE (OUTPATIENT)
Dept: CARDIOLOGY | Facility: CLINIC | Age: 69
End: 2024-05-15
Payer: MEDICARE

## 2024-05-15 RX ORDER — FUROSEMIDE 20 MG/1
20 TABLET ORAL DAILY
Qty: 30 TABLET | Refills: 11 | Status: SHIPPED | OUTPATIENT
Start: 2024-05-15 | End: 2024-05-21

## 2024-05-15 NOTE — TELEPHONE ENCOUNTER
LVM informing pt that his diabetic recheck has been scheduled for Aug     ----- Message from Mony Christensen, OD sent at 5/14/2024  8:20 AM CDT -----  Regarding: FW: Dr. Christensen pt  Call to schedule in AUG for optos at [facility]  ----- Message -----  From: Deepa Shelton OD  Sent: 5/13/2024   5:21 PM CDT  To: Fermin MAYS Staff  Subject: Dr. Christensen pt                                    Pt is due to see Dr. Christensen after cataract surgery global period ends so past 7/10/24 for diabetic ret recheck OU

## 2024-05-15 NOTE — TELEPHONE ENCOUNTER
Spoke with Ms. Erika Zamorano who called the pt Mr. Ravi Zamorano on a conference call to inform him of the following.    He got our labs with a K of 5.5. Can we do a phone check in? Recommend he hold his spironolactone and I'm going to call in low dose of lasix at 20mg.    Need repeat labs Friday with starting lasix and holding aldactone to make sure K normalizes.     Pt was informed of his upcoming lab appt 5/17/24 for 835a. He was also informed of his upcoming appt with us 5/31/2024 at 9am with labs prior. The pt and his wife both verbalized understanding.

## 2024-05-17 ENCOUNTER — LAB VISIT (OUTPATIENT)
Dept: LAB | Facility: HOSPITAL | Age: 69
End: 2024-05-17
Payer: MEDICARE

## 2024-05-17 ENCOUNTER — TELEPHONE (OUTPATIENT)
Dept: CARDIOLOGY | Facility: CLINIC | Age: 69
End: 2024-05-17
Payer: MEDICARE

## 2024-05-17 DIAGNOSIS — R06.02 SOB (SHORTNESS OF BREATH): ICD-10-CM

## 2024-05-17 LAB
ALBUMIN SERPL BCP-MCNC: 3.4 G/DL (ref 3.5–5.2)
ALP SERPL-CCNC: 45 U/L (ref 55–135)
ALT SERPL W/O P-5'-P-CCNC: <5 U/L (ref 10–44)
ANION GAP SERPL CALC-SCNC: 9 MMOL/L (ref 8–16)
AST SERPL-CCNC: 8 U/L (ref 10–40)
BILIRUB SERPL-MCNC: 0.4 MG/DL (ref 0.1–1)
BNP SERPL-MCNC: 4426 PG/ML (ref 0–99)
BUN SERPL-MCNC: 21 MG/DL (ref 8–23)
CALCIUM SERPL-MCNC: 8.9 MG/DL (ref 8.7–10.5)
CHLORIDE SERPL-SCNC: 107 MMOL/L (ref 95–110)
CO2 SERPL-SCNC: 18 MMOL/L (ref 23–29)
CREAT SERPL-MCNC: 1.7 MG/DL (ref 0.5–1.4)
EST. GFR  (NO RACE VARIABLE): 43.9 ML/MIN/1.73 M^2
GLUCOSE SERPL-MCNC: 128 MG/DL (ref 70–110)
POTASSIUM SERPL-SCNC: 4.6 MMOL/L (ref 3.5–5.1)
PROT SERPL-MCNC: 6.1 G/DL (ref 6–8.4)
SODIUM SERPL-SCNC: 134 MMOL/L (ref 136–145)

## 2024-05-17 PROCEDURE — 83880 ASSAY OF NATRIURETIC PEPTIDE: CPT | Mod: HCNC

## 2024-05-17 PROCEDURE — 36415 COLL VENOUS BLD VENIPUNCTURE: CPT | Mod: HCNC

## 2024-05-17 PROCEDURE — 80053 COMPREHEN METABOLIC PANEL: CPT | Mod: HCNC

## 2024-05-17 NOTE — TELEPHONE ENCOUNTER
Call PT to advise of lab results.    Can we call and let him know potassium normalized.    PT can verbalize understanding of above results.

## 2024-05-20 ENCOUNTER — LAB VISIT (OUTPATIENT)
Dept: LAB | Facility: HOSPITAL | Age: 69
End: 2024-05-20
Attending: INTERNAL MEDICINE
Payer: MEDICARE

## 2024-05-20 ENCOUNTER — OFFICE VISIT (OUTPATIENT)
Dept: HEMATOLOGY/ONCOLOGY | Facility: CLINIC | Age: 69
End: 2024-05-20
Payer: MEDICARE

## 2024-05-20 VITALS
WEIGHT: 190.25 LBS | HEART RATE: 71 BPM | OXYGEN SATURATION: 98 % | BODY MASS INDEX: 25.22 KG/M2 | HEIGHT: 73 IN | RESPIRATION RATE: 16 BRPM | DIASTOLIC BLOOD PRESSURE: 93 MMHG | SYSTOLIC BLOOD PRESSURE: 198 MMHG | TEMPERATURE: 98 F

## 2024-05-20 DIAGNOSIS — D64.9 SYMPTOMATIC ANEMIA: ICD-10-CM

## 2024-05-20 DIAGNOSIS — D69.6 THROMBOCYTOPENIA, UNSPECIFIED: ICD-10-CM

## 2024-05-20 DIAGNOSIS — Z94.0 S/P KIDNEY TRANSPLANT: ICD-10-CM

## 2024-05-20 DIAGNOSIS — D64.9 SYMPTOMATIC ANEMIA: Primary | ICD-10-CM

## 2024-05-20 DIAGNOSIS — N18.32 STAGE 3B CHRONIC KIDNEY DISEASE: ICD-10-CM

## 2024-05-20 LAB
BASOPHILS # BLD AUTO: 0.02 K/UL (ref 0–0.2)
BASOPHILS NFR BLD: 0.4 % (ref 0–1.9)
DIFFERENTIAL METHOD BLD: ABNORMAL
EOSINOPHIL # BLD AUTO: 0 K/UL (ref 0–0.5)
EOSINOPHIL NFR BLD: 0.8 % (ref 0–8)
ERYTHROCYTE [DISTWIDTH] IN BLOOD BY AUTOMATED COUNT: 16.3 % (ref 11.5–14.5)
HCT VFR BLD AUTO: 32.6 % (ref 40–54)
HGB BLD-MCNC: 8.9 G/DL (ref 14–18)
IMM GRANULOCYTES # BLD AUTO: 0.25 K/UL (ref 0–0.04)
IMM GRANULOCYTES NFR BLD AUTO: 4.8 % (ref 0–0.5)
LYMPHOCYTES # BLD AUTO: 0.4 K/UL (ref 1–4.8)
LYMPHOCYTES NFR BLD: 6.7 % (ref 18–48)
MCH RBC QN AUTO: 24.3 PG (ref 27–31)
MCHC RBC AUTO-ENTMCNC: 27.3 G/DL (ref 32–36)
MCV RBC AUTO: 89 FL (ref 82–98)
MONOCYTES # BLD AUTO: 1 K/UL (ref 0.3–1)
MONOCYTES NFR BLD: 18.7 % (ref 4–15)
NEUTROPHILS # BLD AUTO: 3.6 K/UL (ref 1.8–7.7)
NEUTROPHILS NFR BLD: 68.6 % (ref 38–73)
NRBC BLD-RTO: 0 /100 WBC
PLATELET # BLD AUTO: 146 K/UL (ref 150–450)
PMV BLD AUTO: ABNORMAL FL (ref 9.2–12.9)
RBC # BLD AUTO: 3.66 M/UL (ref 4.6–6.2)
WBC # BLD AUTO: 5.19 K/UL (ref 3.9–12.7)

## 2024-05-20 PROCEDURE — 3080F DIAST BP >= 90 MM HG: CPT | Mod: HCNC,CPTII,S$GLB, | Performed by: INTERNAL MEDICINE

## 2024-05-20 PROCEDURE — 3008F BODY MASS INDEX DOCD: CPT | Mod: HCNC,CPTII,S$GLB, | Performed by: INTERNAL MEDICINE

## 2024-05-20 PROCEDURE — 3288F FALL RISK ASSESSMENT DOCD: CPT | Mod: HCNC,CPTII,S$GLB, | Performed by: INTERNAL MEDICINE

## 2024-05-20 PROCEDURE — 1126F AMNT PAIN NOTED NONE PRSNT: CPT | Mod: HCNC,CPTII,S$GLB, | Performed by: INTERNAL MEDICINE

## 2024-05-20 PROCEDURE — 99999 PR PBB SHADOW E&M-EST. PATIENT-LVL V: CPT | Mod: PBBFAC,HCNC,, | Performed by: INTERNAL MEDICINE

## 2024-05-20 PROCEDURE — 84165 PROTEIN E-PHORESIS SERUM: CPT | Mod: HCNC | Performed by: INTERNAL MEDICINE

## 2024-05-20 PROCEDURE — 1101F PT FALLS ASSESS-DOCD LE1/YR: CPT | Mod: HCNC,CPTII,S$GLB, | Performed by: INTERNAL MEDICINE

## 2024-05-20 PROCEDURE — 82668 ASSAY OF ERYTHROPOIETIN: CPT | Mod: HCNC | Performed by: INTERNAL MEDICINE

## 2024-05-20 PROCEDURE — 85025 COMPLETE CBC W/AUTO DIFF WBC: CPT | Mod: HCNC | Performed by: INTERNAL MEDICINE

## 2024-05-20 PROCEDURE — 36415 COLL VENOUS BLD VENIPUNCTURE: CPT | Mod: HCNC | Performed by: INTERNAL MEDICINE

## 2024-05-20 PROCEDURE — 3044F HG A1C LEVEL LT 7.0%: CPT | Mod: HCNC,CPTII,S$GLB, | Performed by: INTERNAL MEDICINE

## 2024-05-20 PROCEDURE — 3077F SYST BP >= 140 MM HG: CPT | Mod: HCNC,CPTII,S$GLB, | Performed by: INTERNAL MEDICINE

## 2024-05-20 PROCEDURE — 99204 OFFICE O/P NEW MOD 45 MIN: CPT | Mod: HCNC,S$GLB,, | Performed by: INTERNAL MEDICINE

## 2024-05-20 PROCEDURE — 84165 PROTEIN E-PHORESIS SERUM: CPT | Mod: 26,HCNC,, | Performed by: PATHOLOGY

## 2024-05-20 PROCEDURE — 4010F ACE/ARB THERAPY RXD/TAKEN: CPT | Mod: HCNC,CPTII,S$GLB, | Performed by: INTERNAL MEDICINE

## 2024-05-20 PROCEDURE — 1159F MED LIST DOCD IN RCRD: CPT | Mod: HCNC,CPTII,S$GLB, | Performed by: INTERNAL MEDICINE

## 2024-05-20 NOTE — PROGRESS NOTES
Subjective     Patient ID: Ravi Zamorano is a 68 y.o. male.    Chief Complaint: No chief complaint on file.    HPI 68-year-old man referred for evaluation of chronic anemia with recent worsening.      He reports that he has been feeling fairly well. Has had some left leg pain over the last week which is new.  Breathing and LE edema have improved since his hospitalization for CHF on 5/2/24.  Appetite has been chronically poor for 6-12 M and he has lost weight.      Anemia History:  He had microcytosis without anemia in 2006 with a mild decrease in platelets of [id number].  White blood cell count was normal at that time.    In 2014 hemoglobin was 10 normal white blood cell count and platelet count.  In 2016 iron studies were unremarkable.  His hemoglobin ranged from 12-13 with platelet count in the 100-640640 range in 2016.  He had normal iron studies in January 2017.  In hemoglobin ranged from the 11-12 grams/deciliter range up until August 2022.  In November 2022 hemoglobin was 10 that declined to 9.1 in November 2023.    Iron studies in July 2022 showed iron saturation of 6% with an iron of 14 TIBC of 222 and ferritin of 150.    He presented to the emergency room on April 1, 2024 with a hemoglobin of 6.  He received 2 units of packed red blood cells.  Other studies included iron saturation of 56%, ferritin 464, B12 and folate were normal. Reticulocyte count was 0.6%, haptoglobin was normal. TSH normal.    On May 14, 2024 hemoglobin was 8.8 with an SUV of 90 white blood cell count 5350 and platelets 188008.  Metabolic profile showed a creatinine of 1.7.    Patient has multiple medical problems, most importantly he is status post renal transplant in 2016 and is on immunosuppression with tacrolimus and mycophenolate.    Other medical problems include hyperlipidemia hypertension diabetes type 2 and atrial fibrillation, CHF  .      Additional medications include: apixaban, gabapentin, insulin, Jardiance, valsartan,  Lasix, Coreg, aspirin, and famotidine.        Social history : former smoker, ETOH - 3/week    Work:retired     Family history:negative for blood disorders      Review of Systems   Constitutional:  Positive for appetite change (decreased for 6-12 M) and unexpected weight change (loss). Negative for activity change.   HENT:  Negative for nosebleeds.    Respiratory:  Positive for shortness of breath.    Cardiovascular:  Positive for leg swelling.   Gastrointestinal: Negative.  Negative for blood in stool.   Genitourinary:  Negative for hematuria.   Musculoskeletal:         Left leg pain over the last week   Psychiatric/Behavioral: Negative.            Objective     Physical Exam  Vitals reviewed.   Constitutional:       General: He is not in acute distress.     Appearance: Normal appearance.   HENT:      Mouth/Throat:      Mouth: Mucous membranes are moist.      Pharynx: Oropharynx is clear. No oropharyngeal exudate or posterior oropharyngeal erythema.   Eyes:      General: No scleral icterus.     Conjunctiva/sclera: Conjunctivae normal.      Pupils: Pupils are equal, round, and reactive to light.   Cardiovascular:      Rate and Rhythm: Rhythm irregular.   Pulmonary:      Effort: Pulmonary effort is normal. No respiratory distress.      Breath sounds: Normal breath sounds. No wheezing or rales.   Abdominal:      Palpations: Abdomen is soft. There is no mass.      Tenderness: There is no abdominal tenderness.   Musculoskeletal:      Right lower leg: Edema present.      Left lower leg: Edema present.   Lymphadenopathy:      Cervical: No cervical adenopathy.      Upper Body:      Right upper body: No supraclavicular or axillary adenopathy.      Left upper body: No supraclavicular or axillary adenopathy.   Skin:     Findings: No rash.   Neurological:      Mental Status: He is alert and oriented to person, place, and time.   Psychiatric:         Mood and Affect: Mood normal.         Behavior: Behavior normal.          Thought Content: Thought content normal.         Judgment: Judgment normal.          Assessment and Plan     1. Symptomatic anemia    2. Thrombocytopenia, unspecified      Findings most consistent with anemia of chronic disease  - related to his kidney disease and possibly some medication effect from his immunosuppressants.    He may have underlying thalassemia trait given his previous microcytosis without anemia.  There has been no evidence for hemolysis.  He has intermittent mild thrombocytopenia which may be immune mediated, fortunately this has not been a persistent or progressive problem.    We will plan to recheck blood work today to review his peripheral smear, check erythropoietin level and SPEP  I discussed the possible need for a bone marrow exam if thoses tests are not helpful.      Route Chart for Scheduling    Med Onc Chart Routing      Follow up with physician No follow up needed.   Follow up with KYLE    Infusion scheduling note    Injection scheduling note    Labs None   Scheduling:  Preferred lab:  Lab interval:     Imaging None      Pharmacy appointment No pharmacy appointment needed      Other referrals no referral to [facility] needed -  no Massage appointment needed    No additional referrals needed           Therapy Plan Information  Flushes  sodium chloride 0.9% flush 10 mL  10 mL, Intravenous, PRN  heparin lock flush (porcine) injection 100 Units  100 Units, Intravenous, PRN         No follow-ups on file.

## 2024-05-21 ENCOUNTER — OFFICE VISIT (OUTPATIENT)
Dept: CARDIOLOGY | Facility: CLINIC | Age: 69
End: 2024-05-21
Payer: MEDICARE

## 2024-05-21 ENCOUNTER — OFFICE VISIT (OUTPATIENT)
Dept: INTERNAL MEDICINE | Facility: CLINIC | Age: 69
End: 2024-05-21
Payer: MEDICARE

## 2024-05-21 ENCOUNTER — TELEPHONE (OUTPATIENT)
Dept: CARDIOLOGY | Facility: CLINIC | Age: 69
End: 2024-05-21
Payer: MEDICARE

## 2024-05-21 VITALS
HEIGHT: 73 IN | SYSTOLIC BLOOD PRESSURE: 130 MMHG | WEIGHT: 192.25 LBS | HEART RATE: 82 BPM | BODY MASS INDEX: 25.48 KG/M2 | DIASTOLIC BLOOD PRESSURE: 70 MMHG | OXYGEN SATURATION: 99 %

## 2024-05-21 VITALS
SYSTOLIC BLOOD PRESSURE: 179 MMHG | HEIGHT: 73 IN | DIASTOLIC BLOOD PRESSURE: 79 MMHG | HEART RATE: 82 BPM | BODY MASS INDEX: 25.27 KG/M2 | WEIGHT: 190.69 LBS

## 2024-05-21 DIAGNOSIS — Z79.4 TYPE 2 DIABETES MELLITUS WITH STAGE 3A CHRONIC KIDNEY DISEASE, WITH LONG-TERM CURRENT USE OF INSULIN: Primary | ICD-10-CM

## 2024-05-21 DIAGNOSIS — Z94.0 S/P KIDNEY TRANSPLANT: ICD-10-CM

## 2024-05-21 DIAGNOSIS — I48.0 PAROXYSMAL A-FIB: ICD-10-CM

## 2024-05-21 DIAGNOSIS — E11.42 DIABETIC POLYNEUROPATHY ASSOCIATED WITH TYPE 2 DIABETES MELLITUS: ICD-10-CM

## 2024-05-21 DIAGNOSIS — I10 PRIMARY HYPERTENSION: ICD-10-CM

## 2024-05-21 DIAGNOSIS — Z94.0 KIDNEY REPLACED BY TRANSPLANT: ICD-10-CM

## 2024-05-21 DIAGNOSIS — I48.19 PERSISTENT ATRIAL FIBRILLATION: ICD-10-CM

## 2024-05-21 DIAGNOSIS — N18.31 STAGE 3A CHRONIC KIDNEY DISEASE: ICD-10-CM

## 2024-05-21 DIAGNOSIS — N18.31 TYPE 2 DIABETES MELLITUS WITH STAGE 3A CHRONIC KIDNEY DISEASE, WITH LONG-TERM CURRENT USE OF INSULIN: Primary | ICD-10-CM

## 2024-05-21 DIAGNOSIS — I73.9 PAD (PERIPHERAL ARTERY DISEASE): ICD-10-CM

## 2024-05-21 DIAGNOSIS — E11.22 TYPE 2 DIABETES MELLITUS WITH STAGE 3A CHRONIC KIDNEY DISEASE, WITH LONG-TERM CURRENT USE OF INSULIN: ICD-10-CM

## 2024-05-21 DIAGNOSIS — E11.22 TYPE 2 DIABETES MELLITUS WITH STAGE 3A CHRONIC KIDNEY DISEASE, WITH LONG-TERM CURRENT USE OF INSULIN: Primary | ICD-10-CM

## 2024-05-21 DIAGNOSIS — N18.31 TYPE 2 DIABETES MELLITUS WITH STAGE 3A CHRONIC KIDNEY DISEASE, WITH LONG-TERM CURRENT USE OF INSULIN: ICD-10-CM

## 2024-05-21 DIAGNOSIS — Z79.4 TYPE 2 DIABETES MELLITUS WITH STAGE 3A CHRONIC KIDNEY DISEASE, WITH LONG-TERM CURRENT USE OF INSULIN: ICD-10-CM

## 2024-05-21 DIAGNOSIS — I50.33 ACUTE ON CHRONIC DIASTOLIC CONGESTIVE HEART FAILURE: Primary | ICD-10-CM

## 2024-05-21 DIAGNOSIS — E78.2 MIXED HYPERLIPIDEMIA: ICD-10-CM

## 2024-05-21 LAB
ALBUMIN SERPL ELPH-MCNC: 3.36 G/DL (ref 3.35–5.55)
ALPHA1 GLOB SERPL ELPH-MCNC: 0.27 G/DL (ref 0.17–0.41)
ALPHA2 GLOB SERPL ELPH-MCNC: 0.54 G/DL (ref 0.43–0.99)
B-GLOBULIN SERPL ELPH-MCNC: 0.56 G/DL (ref 0.5–1.1)
GAMMA GLOB SERPL ELPH-MCNC: 1.07 G/DL (ref 0.67–1.58)
GLUCOSE SERPL-MCNC: 192 MG/DL (ref 70–110)
PATHOLOGIST INTERPRETATION SPE: NORMAL
PROT SERPL-MCNC: 5.8 G/DL (ref 6–8.4)

## 2024-05-21 PROCEDURE — 3044F HG A1C LEVEL LT 7.0%: CPT | Mod: HCNC,CPTII,S$GLB, | Performed by: INTERNAL MEDICINE

## 2024-05-21 PROCEDURE — 3008F BODY MASS INDEX DOCD: CPT | Mod: HCNC,CPTII,S$GLB, | Performed by: INTERNAL MEDICINE

## 2024-05-21 PROCEDURE — 3078F DIAST BP <80 MM HG: CPT | Mod: HCNC,CPTII,S$GLB, | Performed by: PHYSICIAN ASSISTANT

## 2024-05-21 PROCEDURE — 99999 PR PBB SHADOW E&M-EST. PATIENT-LVL V: CPT | Mod: PBBFAC,HCNC,, | Performed by: PHYSICIAN ASSISTANT

## 2024-05-21 PROCEDURE — 3008F BODY MASS INDEX DOCD: CPT | Mod: HCNC,CPTII,S$GLB, | Performed by: PHYSICIAN ASSISTANT

## 2024-05-21 PROCEDURE — 99214 OFFICE O/P EST MOD 30 MIN: CPT | Mod: HCNC,S$GLB,, | Performed by: INTERNAL MEDICINE

## 2024-05-21 PROCEDURE — 3075F SYST BP GE 130 - 139MM HG: CPT | Mod: HCNC,CPTII,S$GLB, | Performed by: INTERNAL MEDICINE

## 2024-05-21 PROCEDURE — 4010F ACE/ARB THERAPY RXD/TAKEN: CPT | Mod: HCNC,CPTII,S$GLB, | Performed by: INTERNAL MEDICINE

## 2024-05-21 PROCEDURE — 1126F AMNT PAIN NOTED NONE PRSNT: CPT | Mod: HCNC,CPTII,S$GLB, | Performed by: INTERNAL MEDICINE

## 2024-05-21 PROCEDURE — 1101F PT FALLS ASSESS-DOCD LE1/YR: CPT | Mod: HCNC,CPTII,S$GLB, | Performed by: INTERNAL MEDICINE

## 2024-05-21 PROCEDURE — 99214 OFFICE O/P EST MOD 30 MIN: CPT | Mod: HCNC,S$GLB,, | Performed by: PHYSICIAN ASSISTANT

## 2024-05-21 PROCEDURE — 3078F DIAST BP <80 MM HG: CPT | Mod: HCNC,CPTII,S$GLB, | Performed by: INTERNAL MEDICINE

## 2024-05-21 PROCEDURE — 82962 GLUCOSE BLOOD TEST: CPT | Mod: HCNC,S$GLB,, | Performed by: INTERNAL MEDICINE

## 2024-05-21 PROCEDURE — 3077F SYST BP >= 140 MM HG: CPT | Mod: HCNC,CPTII,S$GLB, | Performed by: PHYSICIAN ASSISTANT

## 2024-05-21 PROCEDURE — 1159F MED LIST DOCD IN RCRD: CPT | Mod: HCNC,CPTII,S$GLB, | Performed by: INTERNAL MEDICINE

## 2024-05-21 PROCEDURE — 1126F AMNT PAIN NOTED NONE PRSNT: CPT | Mod: HCNC,CPTII,S$GLB, | Performed by: PHYSICIAN ASSISTANT

## 2024-05-21 PROCEDURE — 1160F RVW MEDS BY RX/DR IN RCRD: CPT | Mod: HCNC,CPTII,S$GLB, | Performed by: INTERNAL MEDICINE

## 2024-05-21 PROCEDURE — 4010F ACE/ARB THERAPY RXD/TAKEN: CPT | Mod: HCNC,CPTII,S$GLB, | Performed by: PHYSICIAN ASSISTANT

## 2024-05-21 PROCEDURE — 1159F MED LIST DOCD IN RCRD: CPT | Mod: HCNC,CPTII,S$GLB, | Performed by: PHYSICIAN ASSISTANT

## 2024-05-21 PROCEDURE — 99999 PR PBB SHADOW E&M-EST. PATIENT-LVL V: CPT | Mod: PBBFAC,HCNC,, | Performed by: INTERNAL MEDICINE

## 2024-05-21 PROCEDURE — 3288F FALL RISK ASSESSMENT DOCD: CPT | Mod: HCNC,CPTII,S$GLB, | Performed by: INTERNAL MEDICINE

## 2024-05-21 PROCEDURE — 3044F HG A1C LEVEL LT 7.0%: CPT | Mod: HCNC,CPTII,S$GLB, | Performed by: PHYSICIAN ASSISTANT

## 2024-05-21 PROCEDURE — 1160F RVW MEDS BY RX/DR IN RCRD: CPT | Mod: HCNC,CPTII,S$GLB, | Performed by: PHYSICIAN ASSISTANT

## 2024-05-21 RX ORDER — FUROSEMIDE 20 MG/1
20 TABLET ORAL 2 TIMES DAILY
Qty: 180 TABLET | Refills: 3 | Status: ON HOLD | OUTPATIENT
Start: 2024-05-21 | End: 2024-06-16 | Stop reason: HOSPADM

## 2024-05-21 RX ORDER — DEXTROSE 4 G
TABLET,CHEWABLE ORAL
Qty: 1 EACH | Refills: 0 | Status: SHIPPED | OUTPATIENT
Start: 2024-05-21

## 2024-05-21 RX ORDER — CARVEDILOL 25 MG/1
50 TABLET ORAL 2 TIMES DAILY
Qty: 360 TABLET | Refills: 3 | Status: ON HOLD | OUTPATIENT
Start: 2024-05-21 | End: 2024-06-11

## 2024-05-21 RX ORDER — BLOOD-GLUCOSE CONTROL, NORMAL
EACH MISCELLANEOUS
Qty: 100 EACH | Refills: 3 | Status: SHIPPED | OUTPATIENT
Start: 2024-05-21

## 2024-05-21 NOTE — TELEPHONE ENCOUNTER
"[facility]) weekly phone follow up / triage call completed.     [facility] RN Navigator spoke with  PT    Current Patient reported weight: 190.0 lbs [facility]   Patient Goal Weight: (UNKNOWN)  Recent Patient reported blood pressure and heart rate:  BP-179/79 P-82    Pt reports the following:  []  Shortness of Breath with Activity  []  Shortness of Breath at rest   []  Fatigue  [x]  Edema a little @ ankles  [] Chest pain or tightness  [] Weight Increase since discharge  [] None of the above    Pt reports using "Daily weight and symptom tracker".    Pt reports being in the GREEN(color) Zone. If in yellow/red, reminded that they should be calling [facility] today or now.     Medications:   Medication compliance reviewed with pt.  Pt reports having medication list available and has all medications at home for use per list.  Just picked up refills    Education:   Confirmed pt still has "[facility] Home Care Guide"  . YES    Reminded of key points as listed below.     Recommend 2 -3 gram sodium restriction and 1500 cc-2000 cc fluid restriction.  Encourage physical activity with graded exercise program.  Requested patient to weigh themselves daily, and to notify us if their weight increases by more than 3 lbs in 1 day or 5 lbs in 3 days.   Reminded to use "Daily weight and symptom tracker".  Even if pt does not have a scale, to use symptom tracker.       Watch for these Signs and Symptoms: If any of these occur, contact [facility] immediately:   Increase in shortness of breath with movement   Increase in swelling in your legs and ankles   Weight gain of more than 3 pounds in a day or 5 pounds in 3 days.   Difficulty breathing when you are lying down   Worsening fatigue or tiredness   Stomach bloating, a full feeling or a loss of appetite   Increased coughing--especially when you are lying down      Pt was able to verbalize back to RN in their own words " correct diet/fluid restrictions, necessity for exercise, warning signs and symptoms, when and how to contact their [facility] team.      Pt reminded of upcoming appointment.  PT reports they will attend.  5- @ 0900      Pt reminded of how and when to contact [facility]:  [phone] (Mon-Fri, 8a-5p) & for urgent issues on the weekend to page the Heart Transplant MD on call.  Pt also encouraged utilize [facility] messaging as well.      Pt  verbalized understanding and in agreement of plan.       Will follow up with pt at next clinic visit and RN navigator available for pt questions, issues or concerns.   PT will have the one appt and then will be Out of Window.

## 2024-05-21 NOTE — PROGRESS NOTES
"Subjective:       Patient ID: Ravi Zamorano is a 68 y.o. male.    Chief Complaint: Follow-up    HPI  68 y.o. male presents for a hospital follow up visit. I have reviewed discharge summary as well as all relevant laboratory and pathology results and imaging.     Patient was admitted 5/2/24 to 5/3/24 for CHF exacerbation, improved w IV diuresisis.     Changes were made to his medications including up titration of valsartan and Coreg, HCTZ was discontinued.  TTE was obtained.  Was in atrial fibrillation during study, which showed EF 50-55%. Unable to assess diastolic function due to atrial fibrillation.  Regarding AF, has appt with EP physician on 5/6. Ablation apparently planned in the near future.      Having pain in left leg radiating down from buttocks through thigh and calf.   multi-level mild to moderate peripheral arterial occlusive disease.   He tried to do walking program as recommended by Dr. Gottlieb but reports leg is too painful.     ASA 81 mg daily, Eliqus 5 mg bid, and atorvastatin 40 mg daily.   Lab Results   Component Value Date    HGBA1C 6.8 (H) 05/02/2024    HGBA1C 8.3 (H) 03/11/2024    HGBA1C 8.4 (H) 11/14/2023     BP much better controlled today.    Last smoking 2016. Bought some cigars but will not smoke them.    Gfr 43    On maycol today 182  In office glucometer 176  His home traditional glucometer is old and giving very different readings from the maycol.     Review of Systems   Constitutional:  Negative for fever.   Respiratory:  Negative for shortness of breath.    Cardiovascular:  Negative for chest pain.   Musculoskeletal: Negative.    Skin: Negative.        Objective:   /70   Pulse 82   Ht 6' 1" (1.854 m)   Wt 87.2 kg (192 lb 3.9 oz)   SpO2 99%   BMI 25.36 kg/m²      Physical Exam  Constitutional:       General: He is not in acute distress.     Appearance: He is well-developed. He is not diaphoretic.   HENT:      Head: Normocephalic and atraumatic.   Cardiovascular:      Rate " and Rhythm: Normal rate. Rhythm irregular.      Heart sounds: No murmur heard.  Pulmonary:      Effort: Pulmonary effort is normal. No respiratory distress.      Breath sounds: No wheezing or rales.   Skin:     General: Skin is warm and dry.   Neurological:      Mental Status: He is alert and oriented to person, place, and time.   Psychiatric:         Behavior: Behavior normal.         Assessment:       1. Type 2 diabetes mellitus with stage 3a chronic kidney disease, with long-term current use of insulin    2. Diabetic polyneuropathy associated with type 2 diabetes mellitus    3. Paroxysmal A-fib    4. PAD (peripheral artery disease)    5. Mixed hyperlipidemia    6. Stage 3a chronic kidney disease    7. Kidney replaced by transplant        Plan:       Type 2 diabetes mellitus with stage 3a chronic kidney disease, with long-term current use of insulin  -     blood-glucose meter Misc; Use to check blood glucose 1 times daily, to use with insurance preferred meter  Dispense: 1 each; Refill: 0  -     lancets 30 gauge Misc; Use to check blood glucose 1 times daily, to use with insurance preferred meter  Dispense: 100 each; Refill: 3  -     blood sugar diagnostic Strp; Use to check blood glucose 1 times daily, to use with insurance preferred meter  Dispense: 100 each; Refill: 11  -     POCT Glucose, Hand-Held Device    Diabetic polyneuropathy associated with type 2 diabetes mellitus    Paroxysmal A-fib  Ablation planned w Dr. Bowden    PAD (peripheral artery disease)  Will send msg to Dr. Gottlieb re: inability to tolerate walking program and next steps    Mixed hyperlipidemia  - stable and controlled  - continue current regimen    Stage 3a chronic kidney disease  - stable and controlled  - continue current regimen    Kidney replaced by transplant  - stable and controlled  - continue current regimen          Health Maintenance Due   Topic    Diabetes Urine Screening     Lipid Panel       Rsv  Msg to Dr. Gottlieb - unable to  tolerate walking program due to severe left leg pain; 7/10. Some thigh pain at rest but severe pain with any activity. Should he see him sooner or role for surgery?

## 2024-05-21 NOTE — PROGRESS NOTES
General Cardiology Clinic Note  Reason for Visit: Heart failure   Last Clinic Visit: 4/18/2024 with Dr. Gottlieb    HPI:   Ravi Zamorano is a 68 y.o. male who presents for heart failure.     Problems:  Atrial fibrillation   HFpEF  Hypertension  Hyperlipidemia   PAD  IDDM  ESRD s/p kidney transplant 2016   Anemia    HPI  Patient was admitted 5/2-5/3 for ADHF. Presented to the ED with DOSS, orthopnea, edema. In ED, /96. CXR with left sided pleural effusion. BNP >4,900. Troponin 0.049. Hgb 7.9 (at baseline for April). Required brief IV diuresis with improvement. BP was uncontrolled during the hospital possibly contributing to volume overload.  Changes were made to his medications including up titration of valsartan and Coreg, HCTZ was discontinued.  TTE was obtained.  Was in atrial fibrillation during study, which showed EF 50-55%. Unable to assess diastolic function due to atrial fibrillation.      Patient presents for follow up. He still has mild edema. He reports DOSS with mild exertion, such as walking short distances or taking out the trash. He has to walk very slowly. Symptoms are significantly improved from when he presented to the ED, but not normal for him. He denies chest pain, orthopnea, PND, lightheadedness, syncope. He reports palpitations. He is not doing daily weights or checking BP consistently at home. He has two BP cuffs but they give completely different readings. He is on a low sodium diet. He was not discharged on Lasix after the hospitalization, so he was just prescribed it about a week ago. He is also supposed to be taking Coreg 50 mg bid, but is on 25 mg bid.     ROS:      Review of Systems   Constitutional: Negative for diaphoresis, malaise/fatigue, weight gain and weight loss.   HENT:  Negative for nosebleeds.    Eyes:  Negative for vision loss in left eye, vision loss in right eye and visual disturbance.   Cardiovascular:  Positive for dyspnea on exertion, leg swelling and  palpitations. Negative for chest pain, claudication, irregular heartbeat, near-syncope, orthopnea, paroxysmal nocturnal dyspnea and syncope.   Respiratory:  Positive for shortness of breath. Negative for cough, sleep disturbances due to breathing, snoring and wheezing.    Hematologic/Lymphatic: Negative for bleeding problem. Does not bruise/bleed easily.   Skin:  Negative for poor wound healing and rash.   Musculoskeletal:  Negative for muscle cramps and myalgias.   Gastrointestinal:  Negative for bloating, abdominal pain, diarrhea, heartburn, melena, nausea and vomiting.   Genitourinary:  Negative for hematuria and nocturia.   Neurological:  Negative for brief paralysis, dizziness, headaches, light-headedness, numbness and weakness.   Psychiatric/Behavioral:  Negative for depression.    Allergic/Immunologic: Negative for hives.       PMH:     Past Medical History:   Diagnosis Date    Anxiety 7/29/2014    Arthritis     Bilateral diabetic retinopathy 2017    CKD (chronic kidney disease) stage 2, GFR 60-89 ml/min 12/28/2016    CKD (chronic kidney disease) stage 4, GFR 15-29 ml/min 7/29/2014    Colon polyps 2014    Depression - situational 7/29/2014    Diabetes mellitus     Diabetes type 2 since 2000 7/29/2014    Diabetic neuropathy 7/29/2014    History of cardioversion 10/3/2019    History of hepatitis C, s/p successful Rx w/ SVR24 - 9/2017 7/29/2014    Genotype 1a, treatment naive 10/2014 liver biopsy - grade 1 / stage 1 Completed Harvoni w/ SVR    Hyperlipidemia 7/29/2014    Hypertension     Neuropathy     Organ transplant candidate 7/29/2014    Pancreatitis     S/P cadaveric kidney transplant 11/5/2016. ESRD secondary to HTN/DMII 11/5/2016    Stage 3a chronic kidney disease 12/28/2016    Type 2 diabetes mellitus with stage 3a chronic kidney disease, with long-term current use of insulin 7/29/2014     Past Surgical History:   Procedure Laterality Date    APPENDECTOMY      CATARACT EXTRACTION W/  INTRAOCULAR LENS  IMPLANT Right 3/13/2024    Procedure: EXTRACTION, CATARACT, WITH IOL INSERTION;  Surgeon: Jennifer Palacio MD;  Location: [facility] OR;  Service: Ophthalmology;  Laterality: Right;    CATARACT EXTRACTION W/  INTRAOCULAR LENS IMPLANT Left 4/10/2024    Procedure: EXTRACTION, CATARACT, WITH IOL INSERTION;  Surgeon: Jennifer Palacio MD;  Location: [facility] OR;  Service: Ophthalmology;  Laterality: Left;    COLONOSCOPY N/A 12/21/2020    Procedure: COLONOSCOPY;  Surgeon: Tico Bell MD;  Location: [facility] ENDO (4TH FLR);  Service: Endoscopy;  Laterality: N/A;  ok to hold eliquis per Dr. Valadez, see telephone encounter 11/13/2020-MS  covid test 12/18 [place]    KIDNEY TRANSPLANT      TRANSESOPHAGEAL ECHOCARDIOGRAPHY N/A 8/26/2019    Procedure: ECHOCARDIOGRAM, TRANSESOPHAGEAL;  Surgeon: Dolores Diagnostic Provider;  Location: [facility] EP LAB;  Service: Cardiology;  Laterality: N/A;    TREATMENT OF CARDIAC ARRHYTHMIA N/A 8/26/2019    Procedure: CARDIOVERSION;  Surgeon: Bronson Bowden MD;  Location: [facility] EP LAB;  Service: Cardiology;  Laterality: N/A;  AF, FELICIANO, DCCV, MAC, GP, 3 PREP     Allergies:     Review of patient's allergies indicates:   Allergen Reactions    Nifedipine Other (See Comments)     Gingival hyperplasia     Medications:     Current Outpatient Medications on File Prior to Visit   Medication Sig Dispense Refill    apixaban (ELIQUIS) 5 mg Tab Take 1 tablet (5 mg total) by mouth 2 (two) times daily. 60 tablet 0    aspirin 81 MG Chew Take 81 mg by mouth once daily.      atorvastatin (LIPITOR) 40 MG tablet Take 1 tablet (40 mg total) by mouth once daily. 90 tablet 3    blood sugar diagnostic Strp Use to check blood glucose 1 times daily, to use with insurance preferred meter 100 each 11    blood-glucose meter Misc Use to check blood glucose 1 times daily, to use with insurance preferred meter 1 each 0    blood-glucose sensor Kayla Gin 3, use as directed, change every 14 days , e 11.65 2 each 11    carvediloL (COREG) 25 MG  "tablet Take 2 tablets (50 mg total) by mouth 2 (two) times daily. 240 tablet 0    empagliflozin (JARDIANCE) 10 mg tablet Take 1 tablet (10 mg total) by mouth once daily. 30 tablet 11    famotidine (PEPCID) 20 MG tablet Take 1 tablet (20 mg total) by mouth every evening. 90 tablet 3    furosemide (LASIX) 20 MG tablet Take 1 tablet (20 mg total) by mouth once daily. 30 tablet 11    gabapentin (NEURONTIN) 300 MG capsule Take 1 capsule by mouth in the morning, 1 capsule midday, and 3 capsules at night. 450 capsule 3    insulin aspart U-100 (NOVOLOG) 100 unit/mL (3 mL) InPn pen Inject 16 units w/ breakfast, 10 units at lunch and dinner scale 180-230+2, 231-280+4, 281-330+6, 331-380+8, >380+10.  *Max daily 66 units.* 30 mL 6    insulin glargine U-100, Lantus, (LANTUS SOLOSTAR U-100 INSULIN) 100 unit/mL (3 mL) InPn pen Inject 20 Units into the skin every morning. 15 mL 6    lancets 30 gauge Misc Use to check blood glucose 1 times daily, to use with insurance preferred meter 100 each 3    meclizine (ANTIVERT) 25 mg tablet Take 1 tablet (25 mg total) by mouth 3 (three) times daily as needed for Dizziness. 21 tablet 3    mycophenolate (CELLCEPT) 250 mg Cap Take 4 capsules (1,000 mg total) by mouth 2 (two) times daily. HOLD cellcept until Monday (4/8) 240 capsule 11    pen needle, diabetic (BD ULTRA-FINE LO PEN NEEDLE) 32 gauge x 5/32" Ndle USE TO ADMINISTER INSULIN 4 TIMES DAILY. 400 each 3    pen needle, diabetic (BD ULTRA-FINE LO PEN NEEDLE) 32 gauge x 5/32" Ndle use four times a day 100 each 11    predniSONE (DELTASONE) 5 MG tablet Take 1 tablet (5 mg total) by mouth once daily. 30 tablet 11    tacrolimus (PROGRAF) 0.5 MG Cap Take 2 capsules (1 mg total) by mouth every 12 (twelve) hours. 120 capsule 11    valsartan (DIOVAN) 320 MG tablet Take 1 tablet (320 mg total) by mouth once daily. 60 tablet 0    [DISCONTINUED] insulin lispro (HUMALOG KWIKPEN INSULIN) 100 unit/mL pen Inject 18 units w/ breakfast, 16 units w/ lunch " and dinner plus scale 150-200 +2, 201-250 +4, 251-300 +6, 301-350 +8. 30 mL 4    [DISCONTINUED] pantoprazole (PROTONIX) 40 MG tablet Take 1 tablet (40 mg total) by mouth 2 (two) times a day. for 14 days (Patient not taking: Reported on 2024) 28 tablet 0    [DISCONTINUED] spironolactone (ALDACTONE) 25 MG tablet Take 1 tablet (25 mg total) by mouth once daily. (Patient not taking: Reported on 2024) 60 tablet 0     Current Facility-Administered Medications on File Prior to Visit   Medication Dose Route Frequency Provider Last Rate Last Admin    balanced salt irrigation intra-ocular solution 1 drop  1 drop Right Eye On Call Procedure Jennifer Palacio MD        phenylephrine HCL 10% ophthalmic solution 1 drop  1 drop Right Eye PRN Jennifer Palacio MD        proparacaine 0.5 % ophthalmic solution 1 drop  1 drop Right Eye Daily PRN Jennifer Palacio MD        sodium chloride 0.9% flush 10 mL  10 mL Intravenous PRN Jennifer Palacio MD        TETRAcaine HCl (PF) 0.5 % Drop 1 drop  1 drop Right Eye PRN Jennifer Palacio MD         Social History:     Social History     Tobacco Use    Smoking status: Former     Current packs/day: 0.00     Average packs/day: 0.5 packs/day for 32.0 years (16.0 ttl pk-yrs)     Types: Cigarettes     Start date: 1984     Quit date: 2016     Years since quittin.4    Smokeless tobacco: Never    Tobacco comments:     Pt reports that he quit in , but started up again in . pt reports he is currently working on quitting again   Substance Use Topics    Alcohol use: Yes     Comment: Pt reports occasional beers on Sundays. Pt reports drinking daily prior to ESRD diagnosis.     Family History:     Family History   Problem Relation Name Age of Onset    Diabetes Mother      Hypertension Mother      Heart disease Mother          CAD with PCI    Heart disease Father      Cancer Brother 2         one sister with breast cancer    Hypertension Brother 2         one sister  "with HTN and borderline DM    Stroke Neg Hx      Kidney disease Neg Hx       Physical Exam:   BP (!) 179/79   Pulse 82   Ht 6' 1" (1.854 m)   Wt 86.5 kg (190 lb 11.2 oz)   BMI 25.16 kg/m²        Physical Exam  Vitals and nursing note reviewed.   Constitutional:       General: He is not in acute distress.     Appearance: Normal appearance.   HENT:      Head: Normocephalic and atraumatic.      Nose: Nose normal.   Eyes:      General: No scleral icterus.     Extraocular Movements: Extraocular movements intact.      Conjunctiva/sclera: Conjunctivae normal.   Neck:      Thyroid: No thyromegaly.      Vascular: No carotid bruit or JVD.   Cardiovascular:      Rate and Rhythm: Normal rate. Rhythm irregular.      Pulses: Normal pulses.      Heart sounds: Normal heart sounds. No murmur heard.     No friction rub. No gallop.   Pulmonary:      Effort: Pulmonary effort is normal.      Breath sounds: Normal breath sounds. No wheezing, rhonchi or rales.   Chest:      Chest wall: No tenderness.   Abdominal:      General: Bowel sounds are normal. There is no distension.      Palpations: Abdomen is soft.      Tenderness: There is no abdominal tenderness.   Musculoskeletal:      Cervical back: Neck supple.      Right lower le+ Pitting Edema (to ankles bilaterally) present.      Left lower le+ Pitting Edema present.   Skin:     General: Skin is warm and dry.      Coloration: Skin is not pale.      Findings: No erythema or rash.      Nails: There is no clubbing.   Neurological:      Mental Status: He is alert and oriented to person, place, and time.   Psychiatric:         Mood and Affect: Mood and affect normal.         Behavior: Behavior normal.          Labs:     Lab Results   Component Value Date     (L) 2024    K 4.6 2024     2024    CO2 18 (L) 2024    BUN 21 2024    CREATININE 1.7 (H) 2024    ANIONGAP 9 2024     Lab Results   Component Value Date    HGBA1C 6.8 (H) " 05/02/2024     Lab Results   Component Value Date    BNP 4,426 (H) 05/17/2024    BNP 4,444 (H) 05/14/2024    BNP >4,900 (H) 05/02/2024    Lab Results   Component Value Date    WBC 5.19 05/20/2024    HGB 8.9 (L) 05/20/2024    HCT 32.6 (L) 05/20/2024    HCT 31 (L) 11/05/2016     (L) 05/20/2024    GRAN 3.6 05/20/2024    GRAN 68.6 05/20/2024     Lab Results   Component Value Date    CHOL 131 06/07/2023    HDL 31 (L) 06/07/2023    LDLCALC 61.4 (L) 06/07/2023    TRIG 193 (H) 06/07/2023          Imaging:   Echocardiograms:   TTE 5/3/2024    Irregularly irregular rhythm was present during the study    Left Ventricle: The left ventricle is normal in size. Ventricular mass is normal. Normal wall thickness. Normal wall motion. There is low normal systolic function with a visually estimated ejection fraction of 50 - 55%. Unable to assess diastolic function due to atrial fibrillation. Singnificant beat to beat variabilty due  to AF.    Left Ventricle: Unable to assess diastolic function due to atrial fibrillation. Normal left ventricular filling pressure.    Right Ventricle: Normal right ventricular cavity size. Wall thickness is normal. Systolic function is normal.    Left Atrium: Left atrium is severely dilated.    Right Atrium: Right atrium is mildly dilated.    Aortic Valve: The aortic valve is a bicuspid valve. There is mild aortic valve sclerosis. There is mild annular calcification present. There is mild aortic regurgitation.    Mitral Valve: There is mild regurgitation.    Aorta: Aortic root is mildly dilated measuring 3.69 cm. Ascending aorta is normal measuring 3.44 cm.    Pulmonary Artery: The estimated pulmonary artery systolic pressure is 42 mmHg.    IVC/SVC: Intermediate venous pressure at 8 mmHg.    Pericardium: There is a small posterior effusion. No indication of cardiac tamponade. Left pleural effusion.    TTE 7/11/2022  The left ventricle is normal in size with eccentric hypertrophy and normal systolic  function. The estimated ejection fraction is 65%.  Normal right ventricular size with normal right ventricular systolic function.  Left ventricular diastolic dysfunction.  Biatrial enlargement.  Mild aortic regurgitation.  PA systolic pressure is at elast 39 mmHg. The IVC is not visualized.    Stress Tests:   Nuclear stress test 5/15/2023    Normal myocardial perfusion scan. There is no evidence of myocardial ischemia or infarction.    The visually estimated ejection fraction is normal at rest and normal during stress.    There is normal wall motion at rest and post stress.    LV cavity size is normal at rest and normal at stress.    The ECG portion of the study is abnormal but not diagnostic due to resting ST-T abnormalities.    The patient reported no chest pain during the stress test.    There were no arrhythmias during stress.    There are no prior studies for comparison.    Caths:   None    Other:  Lower extremity arterial ultrasound 2/1/2024    Right resting WENDI 0.65, is suggestive of moderate right lower extremity arterial disease.    There is a short-segment occlusion at the mid SFA, followed by high-grade stenosis in the distal segment.    The right anterior tibial artery is occluded in the mid segment.    Left resting WENDI 0.70, is suggestive of moderate left lower extremity arterial disease.    The left SFA is occluded in the proximal/mid segment, with distal reconstitution.    The left anterior tibial artery is occluded in the mid segment.    48 Hour Holter Monitor 4/17/2023  Baseline rhythm was sinus bradycardia with first degree AV block and an average heart rate of 55 bpm.  There were no reported symptoms.  There were very rare PVCs with an overall PVC burden of 0.1%. There were frequent PACs with an overall PAC burden of 1.4%.  There were asymptomatic pauses of up to 2.1 seconds in duration during sleep with no evidence of high-degree AV block.  There were no atrial or ventricular arrhythmias.    ABIs  12/9/2022  Moderately decreased right WENDI, 0.69.    Mildly decreased left WENDI, 0.71.    Moderately dampened PVR waveforms bilaterally.      Assessment:     1. Acute on chronic diastolic congestive heart failure    2. Persistent atrial fibrillation    3. Primary hypertension    4. Mixed hyperlipidemia    5. PAD (peripheral artery disease)    6. S/P cadaveric kidney transplant 11/5/2016. ESRD secondary to HTN/DMII    7. Stage 3a chronic kidney disease    8. Type 2 diabetes mellitus with stage 3a chronic kidney disease, with long-term current use of insulin      Plan:     Acute on chronic diastolic CHF  Mildly volume overloaded on exam. NYHA Class 3 symptoms.   Increase Lasix to 20 mg bid   Continue Jardiance 10 mg daily   Discussed the importance of a low sodium diet and daily weights  He has an appt in [facility] in 10 days with labs     Persistent Afib   Symptomatic. Likely contributing to HF symptoms. He is scheduled for an ablation in July   Increase Coreg to 50 mg bid   Continue Eliquis for CVA prophylaxis    Hypertension  Uncontrolled  Increase Lasix and Coreg as above.   Continue Valsartan 320 mg daily     Hyperlipidemia   LDL at goal. Continue Lipitor 40 mg daily     PAD  Following with Dr. Gottlieb    S/p kidney transplant  CKD Stage 3  Stable on recent labs. Scheduled for repeat labs in a week    Type 2 DM   Well controlled.     Follow up in 4 weeks.     Signed:  Nayely Vital PA-C  Cardiology   [phone] - General

## 2024-05-21 NOTE — PATIENT INSTRUCTIONS
Increase Lasix to 20 mg twice daily     Increase Carvedilol to 2 pills TWICE daily (4 total daily)

## 2024-05-24 LAB — EPO SERPL-ACNC: 86.6 MIU/ML (ref 2.6–18.5)

## 2024-05-27 DIAGNOSIS — D46.4 REFRACTORY ANEMIA, UNSPECIFIED: ICD-10-CM

## 2024-05-27 DIAGNOSIS — D69.6 THROMBOCYTOPENIA, UNSPECIFIED: Primary | ICD-10-CM

## 2024-05-27 DIAGNOSIS — D64.9 SYMPTOMATIC ANEMIA: ICD-10-CM

## 2024-05-28 ENCOUNTER — TELEPHONE (OUTPATIENT)
Dept: HEMATOLOGY/ONCOLOGY | Facility: CLINIC | Age: 69
End: 2024-05-28
Payer: MEDICARE

## 2024-05-28 NOTE — TELEPHONE ENCOUNTER
----- Message from Lashawn Clifton NP sent at 5/28/2024  8:10 AM CDT -----  Hey,    He is a Dr. De La Cruz patient and needs a bone marrow please.    Thanks,    Can you let me know when it scheduled so I can place orders?

## 2024-05-30 NOTE — PROGRESS NOTES
[facility] T[facility] Provider Note (Initial Clinic) Consult Note    Age: 68 y.o.  Gender: male  Ethnicity: Black or   Type of Congestive Heart Failure: Diastolic   Etiology: unspecified, Tachycardia induced    Enrolled in Infusion suite: no    Diagnostic Labs:   EKG - 05/02/2024  CXR - 05/02/2024  ECHO - 05/03/2024  Stress test -   Stress echo - 05/15/2023  Pharmacologic stress -   Cardiac catheterization -    Cardiac MRI -     Lab Results   Component Value Date     (L) 05/17/2024     (L) 05/14/2024    K 4.6 05/17/2024    K 5.5 (H) 05/14/2024     05/17/2024     (H) 05/14/2024    CO2 18 (L) 05/17/2024    CO2 15 (L) 05/14/2024     (H) 05/17/2024     (H) 05/14/2024    BUN 21 05/17/2024    BUN 26 (H) 05/14/2024    CREATININE 1.7 (H) 05/17/2024    CREATININE 1.7 (H) 05/14/2024    CALCIUM 8.9 05/17/2024    CALCIUM 9.1 05/14/2024    PROT 6.1 05/17/2024    PROT 6.3 05/14/2024    ALBUMIN 3.4 (L) 05/17/2024    ALBUMIN 3.5 05/14/2024    BILITOT 0.4 05/17/2024    BILITOT 0.5 05/14/2024    ALKPHOS 45 (L) 05/17/2024    ALKPHOS 49 (L) 05/14/2024    AST 8 (L) 05/17/2024    AST 11 05/14/2024    ALT <5 (L) 05/17/2024    ALT <5 (L) 05/14/2024    ANIONGAP 9 05/17/2024    ANIONGAP 9 05/14/2024    ESTGFRAFRICA 51.9 (A) 07/19/2022    ESTGFRAFRICA >60.0 07/12/2022    EGFRNONAA 44.9 (A) 07/19/2022    EGFRNONAA 52.7 (A) 07/12/2022       Lab Results   Component Value Date    WBC 5.19 05/20/2024    WBC 5.35 05/14/2024    RBC 3.66 (L) 05/20/2024    RBC 3.71 (L) 05/14/2024    HGB 8.9 (L) 05/20/2024    HGB 8.8 (L) 05/14/2024    HCT 32.6 (L) 05/20/2024    HCT 33.5 (L) 05/14/2024    MCV 89 05/20/2024    MCV 90 05/14/2024    MCH 24.3 (L) 05/20/2024    MCH 23.7 (L) 05/14/2024    MCHC 27.3 (L) 05/20/2024    MCHC 26.3 (L) 05/14/2024    RDW 16.3 (H) 05/20/2024    RDW 16.3 (H) 05/14/2024     (L) 05/20/2024     05/14/2024    MPV SEE COMMENT 05/20/2024    MPV SEE COMMENT 05/14/2024    IMMGR 4.8 (H)  05/20/2024    IMMGR 2.4 (H) 05/14/2024    IGABS 0.25 (H) 05/20/2024    IGABS 0.13 (H) 05/14/2024    LYMPH 0.4 (L) 05/20/2024    LYMPH 6.7 (L) 05/20/2024    MONO 1.0 05/20/2024    MONO 18.7 (H) 05/20/2024    EOS 0.0 05/20/2024    EOS 0.1 05/14/2024    BASO 0.02 05/20/2024    BASO 0.02 05/14/2024    NRBC 0 05/20/2024    NRBC 0 05/14/2024    GRAN 3.6 05/20/2024    GRAN 68.6 05/20/2024    EOSINOPHIL 0.8 05/20/2024    EOSINOPHIL 0.9 05/14/2024    BASOPHIL 0.4 05/20/2024    BASOPHIL 0.4 05/14/2024    PLTEST Appears normal 04/08/2024    PLTEST Decreased (A) 04/01/2024    ANISO Moderate 04/08/2024    ANISO Moderate 04/01/2024    HYPO Occasional 04/08/2024    HYPO Occasional 10/23/2017       Lab Results   Component Value Date    BNP 4,426 (H) 05/17/2024    BNP 4,444 (H) 05/14/2024    MG 1.5 (L) 05/14/2024    MG 1.3 (L) 05/03/2024    PHOS 2.8 05/14/2024    PHOS 3.9 05/03/2024    TROPONINI 0.036 (H) 05/02/2024    TROPONINI 0.049 (H) 05/02/2024    HGBA1C 6.8 (H) 05/02/2024    HGBA1C 8.3 (H) 03/11/2024    TSH 0.924 04/01/2024    TSH 1.143 03/15/2019    FREET4 1.02 05/14/2018       Lab Results   Component Value Date    IRON 117 04/01/2024    TIBC 209 (L) 04/01/2024    FERRITIN 816 (H) 04/01/2024    CHOL 131 06/07/2023    TRIG 193 (H) 06/07/2023    HDL 31 (L) 06/07/2023    LDLCALC 61.4 (L) 06/07/2023    CHOLHDL 23.7 06/07/2023    TOTALCHOLEST 4.2 06/07/2023    NONHDLCHOL 100 06/07/2023    COLORU Yellow 04/01/2024    APPEARANCEUA Clear 04/01/2024    PHUR 6.0 04/01/2024    SPECGRAV 1.015 04/01/2024    PROTEINUA 1+ (A) 04/01/2024    GLUCUA 4+ (A) 04/01/2024    KETONESU Negative 04/01/2024    BILIRUBINUA Negative 04/01/2024    OCCULTUA Negative 04/01/2024    NITRITE Negative 04/01/2024    LEUKOCYTESUR Negative 04/01/2024       No implanted cardiac devices    Current Outpatient Medications on File Prior to Visit   Medication Sig Dispense Refill    apixaban (ELIQUIS) 5 mg Tab Take 1 tablet (5 mg total) by mouth 2 (two) times daily. 60  "tablet 0    aspirin 81 MG Chew Take 81 mg by mouth once daily.      atorvastatin (LIPITOR) 40 MG tablet Take 1 tablet (40 mg total) by mouth once daily. 90 tablet 3    blood sugar diagnostic Strp Use to check blood glucose 1 times daily, to use with insurance preferred meter 100 each 11    blood-glucose meter Misc Use to check blood glucose 1 times daily, to use with insurance preferred meter 1 each 0    blood-glucose sensor Kayla Gin 3, use as directed, change every 14 days , e 11.65 2 each 11    carvediloL (COREG) 25 MG tablet Take 2 tablets (50 mg total) by mouth 2 (two) times daily. 360 tablet 3    empagliflozin (JARDIANCE) 10 mg tablet Take 1 tablet (10 mg total) by mouth once daily. 30 tablet 11    famotidine (PEPCID) 20 MG tablet Take 1 tablet (20 mg total) by mouth every evening. 90 tablet 3    furosemide (LASIX) 20 MG tablet Take 1 tablet (20 mg total) by mouth 2 (two) times a day. 180 tablet 3    gabapentin (NEURONTIN) 300 MG capsule Take 1 capsule by mouth in the morning, 1 capsule midday, and 3 capsules at night. 450 capsule 3    insulin aspart U-100 (NOVOLOG) 100 unit/mL (3 mL) InPn pen Inject 16 units w/ breakfast, 10 units at lunch and dinner scale 180-230+2, 231-280+4, 281-330+6, 331-380+8, >380+10.  *Max daily 66 units.* 30 mL 6    insulin glargine U-100, Lantus, (LANTUS SOLOSTAR U-100 INSULIN) 100 unit/mL (3 mL) InPn pen Inject 20 Units into the skin every morning. 15 mL 6    lancets 30 gauge Misc Use to check blood glucose 1 times daily, to use with insurance preferred meter 100 each 3    meclizine (ANTIVERT) 25 mg tablet Take 1 tablet (25 mg total) by mouth 3 (three) times daily as needed for Dizziness. 21 tablet 3    mycophenolate (CELLCEPT) 250 mg Cap Take 4 capsules (1,000 mg total) by mouth 2 (two) times daily. HOLD cellcept until Monday (4/8) 240 capsule 11    pen needle, diabetic (BD ULTRA-FINE LO PEN NEEDLE) 32 gauge x 5/32" Ndle USE TO ADMINISTER INSULIN 4 TIMES DAILY. 400 each 3    " "pen needle, diabetic (BD ULTRA-FINE LO PEN NEEDLE) 32 gauge x 5/32" Ndle use four times a day 100 each 11    predniSONE (DELTASONE) 5 MG tablet Take 1 tablet (5 mg total) by mouth once daily. 30 tablet 11    tacrolimus (PROGRAF) 0.5 MG Cap Take 2 capsules (1 mg total) by mouth every 12 (twelve) hours. 120 capsule 11    valsartan (DIOVAN) 320 MG tablet Take 1 tablet (320 mg total) by mouth once daily. 60 tablet 0    [DISCONTINUED] insulin lispro (HUMALOG KWIKPEN INSULIN) 100 unit/mL pen Inject 18 units w/ breakfast, 16 units w/ lunch and dinner plus scale 150-200 +2, 201-250 +4, 251-300 +6, 301-350 +8. 30 mL 4     Current Facility-Administered Medications on File Prior to Visit   Medication Dose Route Frequency Provider Last Rate Last Admin    balanced salt irrigation intra-ocular solution 1 drop  1 drop Right Eye On Call Procedure Jennifer Palacio MD        phenylephrine HCL 10% ophthalmic solution 1 drop  1 drop Right Eye PRN Jennifer Palacio MD        proparacaine 0.5 % ophthalmic solution 1 drop  1 drop Right Eye Daily PRN Jennifer Palacio MD        sodium chloride 0.9% flush 10 mL  10 mL Intravenous PRN Jennifer Palacio MD        TETRAcaine HCl (PF) 0.5 % Drop 1 drop  1 drop Right Eye PRN Jennifer Palacio MD             HPI:  Patient reports improvement in SOB from admission but distance walked limited leg pain more so than SOB, estimates can walk 1/2 block or so and pace normal    Patient sleeps on 1 number of pillows   Patient wakes up SOB, has to get out of bed, associated cough- denies   Palpitations - yes   Dizzy, light-headed, pre-syncope or syncope- denies   Since discharge frequency of performing weights, home weight and weight change- not performing daily weights, reports variable BLE edema   Other information felt pertinent to HPI: Mr. Ravi Zamorano is a 67 yo male with a PMHx of AF, HFpEF, bicuspid AV, HTN, HLD, PAD, IDDM, ESRD s/p renal txp 2016 who presents to first [facility] visit " following recent admission for ADHF. Presented to the ED with DOSS, orthopnea, edema. In ED, /96. CXR with left sided pleural effusion. BNP >4,900. Troponin 0.049. Hgb 7.9 (at baseline for April). Required brief IV diuresis with improvement. BP was uncontrolled during the hospital possibly contributing to volume overload.  Changes were made to his medications including up titration of valsartan and Coreg, HCTZ was discontinued.  TTE was obtained.  Was in atrial fibrillation during study, which showed EF 50-55%. Unable to assess diastolic function due to atrial fibrillation.      PHYSICAL:   Vitals:    05/31/24 0842   BP: (!) 150/91   Pulse: 95      @[id number](3)@    JVD: no   Heart rhythm: irregular  Cardiac murmur: No    S3: no  S4: no  Lungs: clear  Hepatojugular reflux: to clavicle  Edema: trace edema    Echo 5/3/24:    Irregularly irregular rhythm was present during the study    Left Ventricle: The left ventricle is normal in size. Ventricular mass is normal. Normal wall thickness. Normal wall motion. There is low normal systolic function with a visually estimated ejection fraction of 50 - 55%. Unable to assess diastolic function due to atrial fibrillation. Singnificant beat to beat variabilty due  to AF.    Left Ventricle: Unable to assess diastolic function due to atrial fibrillation. Normal left ventricular filling pressure.    Right Ventricle: Normal right ventricular cavity size. Wall thickness is normal. Systolic function is normal.    Left Atrium: Left atrium is severely dilated.    Right Atrium: Right atrium is mildly dilated.    Aortic Valve: The aortic valve is a bicuspid valve. There is mild aortic valve sclerosis. There is mild annular calcification present. There is mild aortic regurgitation.    Mitral Valve: There is mild regurgitation.    Aorta: Aortic root is mildly dilated measuring 3.69 cm. Ascending aorta is normal measuring 3.44 cm.    Pulmonary Artery: The estimated pulmonary artery  systolic pressure is 42 mmHg.    IVC/SVC: Intermediate venous pressure at 8 mmHg.    Pericardium: There is a small posterior effusion. No indication of cardiac tamponade. Left pleural effusion.    ASSESSMENT: HFpEF    PLAN:      Patient Instructions:   Instruct the patient to notify this clinic if HH, a physician or an advanced care provider wants to change medication one of their HF medications   Activity and Diet restrictions:   Recommend 2-3 gram sodium restriction and 1500cc- 2000cc fluid restriction.  Encourage physical activity with graded exercise program.  Requested patient to weigh themselves daily, and to notify us if their weight increases by more than 3 lbs in 1 day or 5 lbs in 3 days.    Assigned dry weight on home scale: unsure  Medication changes (include current dose and changed dose): NYHA Class II symptoms. Appears well compensated on exam. Suspect ongoing symptoms likely 2/2 AF, ablation scheduled in July. Recommend continuing increase lasix frequency of 20mg BID or ongoing volume management. Mag 1.1. Supplement ordered. We held spironolactone previously 2/2 hyperkalemia. Consider resuming for additional BP management once mag normalized now that K is wnl.  Upcoming labs and date anticipated: repeat labs next week to ensure mag normalized    Paris Lujan PA-C

## 2024-05-31 ENCOUNTER — DOCUMENTATION ONLY (OUTPATIENT)
Dept: CARDIOLOGY | Facility: CLINIC | Age: 69
End: 2024-05-31

## 2024-05-31 ENCOUNTER — OFFICE VISIT (OUTPATIENT)
Dept: CARDIOLOGY | Facility: CLINIC | Age: 69
End: 2024-05-31
Payer: MEDICARE

## 2024-05-31 ENCOUNTER — LAB VISIT (OUTPATIENT)
Dept: LAB | Facility: HOSPITAL | Age: 69
End: 2024-05-31
Payer: MEDICARE

## 2024-05-31 VITALS
SYSTOLIC BLOOD PRESSURE: 150 MMHG | BODY MASS INDEX: 25.04 KG/M2 | HEIGHT: 73 IN | DIASTOLIC BLOOD PRESSURE: 91 MMHG | WEIGHT: 188.94 LBS | HEART RATE: 95 BPM | OXYGEN SATURATION: 100 %

## 2024-05-31 DIAGNOSIS — Z79.01 ANTICOAGULANT LONG-TERM USE: ICD-10-CM

## 2024-05-31 DIAGNOSIS — R06.02 SOB (SHORTNESS OF BREATH): ICD-10-CM

## 2024-05-31 DIAGNOSIS — I73.9 PAD (PERIPHERAL ARTERY DISEASE): ICD-10-CM

## 2024-05-31 DIAGNOSIS — Z94.0 S/P KIDNEY TRANSPLANT: ICD-10-CM

## 2024-05-31 DIAGNOSIS — E78.2 MIXED HYPERLIPIDEMIA: ICD-10-CM

## 2024-05-31 DIAGNOSIS — N18.31 STAGE 3A CHRONIC KIDNEY DISEASE: ICD-10-CM

## 2024-05-31 DIAGNOSIS — I50.32 CHRONIC DIASTOLIC CONGESTIVE HEART FAILURE: Primary | ICD-10-CM

## 2024-05-31 DIAGNOSIS — I10 PRIMARY HYPERTENSION: ICD-10-CM

## 2024-05-31 DIAGNOSIS — N18.31 TYPE 2 DIABETES MELLITUS WITH STAGE 3A CHRONIC KIDNEY DISEASE, WITH LONG-TERM CURRENT USE OF INSULIN: ICD-10-CM

## 2024-05-31 DIAGNOSIS — I48.19 PERSISTENT ATRIAL FIBRILLATION: ICD-10-CM

## 2024-05-31 DIAGNOSIS — Z79.4 TYPE 2 DIABETES MELLITUS WITH STAGE 3A CHRONIC KIDNEY DISEASE, WITH LONG-TERM CURRENT USE OF INSULIN: ICD-10-CM

## 2024-05-31 DIAGNOSIS — Z94.0 KIDNEY REPLACED BY TRANSPLANT: Primary | ICD-10-CM

## 2024-05-31 DIAGNOSIS — E11.22 TYPE 2 DIABETES MELLITUS WITH STAGE 3A CHRONIC KIDNEY DISEASE, WITH LONG-TERM CURRENT USE OF INSULIN: ICD-10-CM

## 2024-05-31 DIAGNOSIS — E83.42 HYPOMAGNESEMIA: ICD-10-CM

## 2024-05-31 LAB
ALBUMIN SERPL BCP-MCNC: 3.3 G/DL (ref 3.5–5.2)
ALP SERPL-CCNC: 40 U/L (ref 55–135)
ALT SERPL W/O P-5'-P-CCNC: 5 U/L (ref 10–44)
ANION GAP SERPL CALC-SCNC: 9 MMOL/L (ref 8–16)
AST SERPL-CCNC: 9 U/L (ref 10–40)
BASOPHILS # BLD AUTO: 0.03 K/UL (ref 0–0.2)
BASOPHILS NFR BLD: 0.6 % (ref 0–1.9)
BILIRUB SERPL-MCNC: 0.4 MG/DL (ref 0.1–1)
BNP SERPL-MCNC: 3646 PG/ML (ref 0–99)
BUN SERPL-MCNC: 33 MG/DL (ref 8–23)
CALCIUM SERPL-MCNC: 9.2 MG/DL (ref 8.7–10.5)
CHLORIDE SERPL-SCNC: 99 MMOL/L (ref 95–110)
CO2 SERPL-SCNC: 26 MMOL/L (ref 23–29)
CREAT SERPL-MCNC: 1.9 MG/DL (ref 0.5–1.4)
DIFFERENTIAL METHOD BLD: ABNORMAL
EOSINOPHIL # BLD AUTO: 0 K/UL (ref 0–0.5)
EOSINOPHIL NFR BLD: 0.8 % (ref 0–8)
ERYTHROCYTE [DISTWIDTH] IN BLOOD BY AUTOMATED COUNT: 15.5 % (ref 11.5–14.5)
EST. GFR  (NO RACE VARIABLE): 38.4 ML/MIN/1.73 M^2
GLUCOSE SERPL-MCNC: 242 MG/DL (ref 70–110)
HCT VFR BLD AUTO: 33.7 % (ref 40–54)
HGB BLD-MCNC: 9.2 G/DL (ref 14–18)
IMM GRANULOCYTES # BLD AUTO: 0.21 K/UL (ref 0–0.04)
IMM GRANULOCYTES NFR BLD AUTO: 4.3 % (ref 0–0.5)
LYMPHOCYTES # BLD AUTO: 0.5 K/UL (ref 1–4.8)
LYMPHOCYTES NFR BLD: 10.6 % (ref 18–48)
MAGNESIUM SERPL-MCNC: 1.1 MG/DL (ref 1.6–2.6)
MCH RBC QN AUTO: 24 PG (ref 27–31)
MCHC RBC AUTO-ENTMCNC: 27.3 G/DL (ref 32–36)
MCV RBC AUTO: 88 FL (ref 82–98)
MONOCYTES # BLD AUTO: 0.8 K/UL (ref 0.3–1)
MONOCYTES NFR BLD: 16.9 % (ref 4–15)
NEUTROPHILS # BLD AUTO: 3.3 K/UL (ref 1.8–7.7)
NEUTROPHILS NFR BLD: 66.8 % (ref 38–73)
NRBC BLD-RTO: 0 /100 WBC
PHOSPHATE SERPL-MCNC: 4.4 MG/DL (ref 2.7–4.5)
PLATELET # BLD AUTO: 126 K/UL (ref 150–450)
PMV BLD AUTO: ABNORMAL FL (ref 9.2–12.9)
POTASSIUM SERPL-SCNC: 4 MMOL/L (ref 3.5–5.1)
PROT SERPL-MCNC: 5.9 G/DL (ref 6–8.4)
RBC # BLD AUTO: 3.84 M/UL (ref 4.6–6.2)
SODIUM SERPL-SCNC: 134 MMOL/L (ref 136–145)
WBC # BLD AUTO: 4.9 K/UL (ref 3.9–12.7)

## 2024-05-31 PROCEDURE — 3288F FALL RISK ASSESSMENT DOCD: CPT | Mod: CPTII,S$GLB,,

## 2024-05-31 PROCEDURE — 1126F AMNT PAIN NOTED NONE PRSNT: CPT | Mod: CPTII,S$GLB,,

## 2024-05-31 PROCEDURE — 3044F HG A1C LEVEL LT 7.0%: CPT | Mod: CPTII,S$GLB,,

## 2024-05-31 PROCEDURE — 3080F DIAST BP >= 90 MM HG: CPT | Mod: CPTII,S$GLB,,

## 2024-05-31 PROCEDURE — 1160F RVW MEDS BY RX/DR IN RCRD: CPT | Mod: CPTII,S$GLB,,

## 2024-05-31 PROCEDURE — 1159F MED LIST DOCD IN RCRD: CPT | Mod: CPTII,S$GLB,,

## 2024-05-31 PROCEDURE — 99999 PR PBB SHADOW E&M-EST. PATIENT-LVL III: CPT | Mod: PBBFAC,HCNC,,

## 2024-05-31 PROCEDURE — 84100 ASSAY OF PHOSPHORUS: CPT

## 2024-05-31 PROCEDURE — 99214 OFFICE O/P EST MOD 30 MIN: CPT | Mod: S$GLB,,,

## 2024-05-31 PROCEDURE — 83880 ASSAY OF NATRIURETIC PEPTIDE: CPT

## 2024-05-31 PROCEDURE — 3077F SYST BP >= 140 MM HG: CPT | Mod: CPTII,S$GLB,,

## 2024-05-31 PROCEDURE — 1101F PT FALLS ASSESS-DOCD LE1/YR: CPT | Mod: CPTII,S$GLB,,

## 2024-05-31 PROCEDURE — 36415 COLL VENOUS BLD VENIPUNCTURE: CPT

## 2024-05-31 PROCEDURE — 4010F ACE/ARB THERAPY RXD/TAKEN: CPT | Mod: CPTII,S$GLB,,

## 2024-05-31 PROCEDURE — 3008F BODY MASS INDEX DOCD: CPT | Mod: CPTII,S$GLB,,

## 2024-05-31 PROCEDURE — 85025 COMPLETE CBC W/AUTO DIFF WBC: CPT

## 2024-05-31 PROCEDURE — 83735 ASSAY OF MAGNESIUM: CPT

## 2024-05-31 PROCEDURE — 80053 COMPREHEN METABOLIC PANEL: CPT

## 2024-05-31 RX ORDER — LANOLIN ALCOHOL/MO/W.PET/CERES
400 CREAM (GRAM) TOPICAL 2 TIMES DAILY
Qty: 60 TABLET | Refills: 6 | Status: SHIPPED | OUTPATIENT
Start: 2024-05-31 | End: 2025-05-31

## 2024-05-31 NOTE — PROGRESS NOTES
"Heart Failure Transitional Care Clinic([facility]FT[facility]) First Week Visit     Pt presents to clinic accompanied by wife Erika Zamorano.     Most Recent Hospital Discharge Date: 5/3/2024  Last admission Diagnosis/chief complaint: SOB        Visit Vitals:     Wt Readings from Last 3 Encounters:   05/31/24 85.7 kg (188 lb 15 oz)   05/21/24 86.5 kg (190 lb 11.2 oz)   05/21/24 87.2 kg (192 lb 3.9 oz)     Temp Readings from Last 3 Encounters:   05/20/24 97.8 °F (36.6 °C) (Oral)   05/03/24 98.6 °F (37 °C) (Oral)   04/10/24 97.6 °F (36.4 °C) (Temporal)     BP Readings from Last 3 Encounters:   05/31/24 (!) 150/91   05/21/24 (!) 179/79   05/21/24 130/70     Pulse Readings from Last 3 Encounters:   05/31/24 95   05/21/24 82   05/21/24 82            Pt reports the following:  []  Shortness of Breath with activity  []  Shortness of Breath at rest/ sleeping on 2-3 pillows "some days"  []  Fatigue  []  Edema   [] Chest pain or tightness  [] Weight Increase since discharge  [x] None of the above    Pt reports being in the Green (color) Zone. If in yellow/red, reminded that they should be calling HFT[facility] today or now.      Medications:   Medication reconciliation completed today per MA.  Pt reports having all medications available and understands how to take them appropriately. Reminded pt to call prior to making any changes to medications.      Education:    [x] Gave pt new  / Confirmed pt has  "Heart Failure Transitional Care Clinic Home Care Guide" .   Reviewed key points as listed below.      Recommend 2 gram sodium restriction and 1500cc fluid restriction.  Encourage physical activity with graded exercise program.  Requested patient to weigh themselves daily, and to notify us if their weight increases by more than 3 lbs in 1 day or 5 lbs in 3 days.      [x] Gave / Reviewed "Daily Weight and Symptom Tracker".  Reviewed with patient when and how to call  H[facility] according to "Yellow Zone" and "Red Zone".                  [x] Pt given list of " "low/high sodium food list                   Watch for these Signs and Symptoms: If any of these occur, contact [facility] immediately:   Increase in shortness of breath with movement   Increase in swelling in your legs and ankles   Weight gain of more than 3 pounds in a night or 5 pounds in 3 days.   Difficulty breathing when you are lying down   Worsening fatigue or tiredness   Stomach bloating, a full feeling or a loss of appetite   Increased coughing--especially when you are lying down     MyChart and Care Companion:              Patient active on myChart? No, pt not interested or does not have supportive technology.    Contacting our Team:              Reviewed with pt how to contact H[facility] RN via phone or [employer] messaging.      HF TCC Program Plan:  Pt educated on follow-up plan while in HFTCC program.   [x]  PT given /reviewed upcoming appointment/check in dates. These will include weekly contact with RN or visits with providers over the next 4-6 weeks.                   [x]  Pt educated that they will transitioned to long term care provider team at week 4-6.  This team will be either Cardiology, PCP or Advanced Heart Failure depending on needs.          Pt was able to verbalize back to RN in their own words correct diet/fluid restrictions, necessity for exercise, warning signs and symptoms, when and how to contact their TCC team .       Plan:     See PA-C note      []  Pt given AVS with follow up appointment within  week and medication detail list for ease of use.     [x]  Explained to pt they will have a phone "check in" by Nurse on 6/5/2024 with lab results     [] Pt met with Salinas Armstrong Dietician today after visit.  Refer to his note for details.     Will follow up with pt at next clinic visit and Nurse navigator available for pt questions, issues or concerns.     Please refer to provider visit for additional details and assessment.    "

## 2024-05-31 NOTE — PATIENT INSTRUCTIONS
Magnesium supplement ordered.    Will recheck labs next week to make sure magnesium has normalized.    Notify us ([phone]) with any worsening shortness of breath, swelling or 3lb weight gain on home scale.    Continue current blood pressure regimen for now.

## 2024-06-04 ENCOUNTER — DOCUMENTATION ONLY (OUTPATIENT)
Dept: TRANSPLANT | Facility: CLINIC | Age: 69
End: 2024-06-04
Payer: MEDICARE

## 2024-06-04 ENCOUNTER — PROCEDURE VISIT (OUTPATIENT)
Dept: HEMATOLOGY/ONCOLOGY | Facility: CLINIC | Age: 69
End: 2024-06-04
Payer: MEDICARE

## 2024-06-04 ENCOUNTER — LAB VISIT (OUTPATIENT)
Dept: LAB | Facility: HOSPITAL | Age: 69
End: 2024-06-04
Payer: MEDICARE

## 2024-06-04 VITALS
HEART RATE: 79 BPM | DIASTOLIC BLOOD PRESSURE: 86 MMHG | OXYGEN SATURATION: 99 % | SYSTOLIC BLOOD PRESSURE: 176 MMHG | RESPIRATION RATE: 18 BRPM | TEMPERATURE: 98 F

## 2024-06-04 DIAGNOSIS — I48.0 PAROXYSMAL A-FIB: ICD-10-CM

## 2024-06-04 DIAGNOSIS — D64.9 SYMPTOMATIC ANEMIA: Primary | ICD-10-CM

## 2024-06-04 DIAGNOSIS — Z94.0 KIDNEY REPLACED BY TRANSPLANT: ICD-10-CM

## 2024-06-04 DIAGNOSIS — Z94.0 S/P KIDNEY TRANSPLANT: ICD-10-CM

## 2024-06-04 DIAGNOSIS — R06.02 SOB (SHORTNESS OF BREATH): ICD-10-CM

## 2024-06-04 DIAGNOSIS — D46.4 REFRACTORY ANEMIA, UNSPECIFIED: ICD-10-CM

## 2024-06-04 LAB
ALBUMIN SERPL BCP-MCNC: 3.4 G/DL (ref 3.5–5.2)
ALP SERPL-CCNC: 38 U/L (ref 55–135)
ALT SERPL W/O P-5'-P-CCNC: <5 U/L (ref 10–44)
ANION GAP SERPL CALC-SCNC: 8 MMOL/L (ref 8–16)
APTT PPP: 29.6 SEC (ref 21–32)
AST SERPL-CCNC: 11 U/L (ref 10–40)
BILIRUB SERPL-MCNC: 0.3 MG/DL (ref 0.1–1)
BNP SERPL-MCNC: 3313 PG/ML (ref 0–99)
BUN SERPL-MCNC: 26 MG/DL (ref 8–23)
CALCIUM SERPL-MCNC: 9.4 MG/DL (ref 8.7–10.5)
CHLORIDE SERPL-SCNC: 100 MMOL/L (ref 95–110)
CO2 SERPL-SCNC: 29 MMOL/L (ref 23–29)
CREAT SERPL-MCNC: 1.8 MG/DL (ref 0.5–1.4)
EST. GFR  (NO RACE VARIABLE): 41 ML/MIN/1.73 M^2
GLUCOSE SERPL-MCNC: 160 MG/DL (ref 70–110)
INR PPP: 1.2 (ref 0.8–1.2)
MAGNESIUM SERPL-MCNC: 1.4 MG/DL (ref 1.6–2.6)
POTASSIUM SERPL-SCNC: 4.7 MMOL/L (ref 3.5–5.1)
PROT SERPL-MCNC: 6.1 G/DL (ref 6–8.4)
PROTHROMBIN TIME: 13 SEC (ref 9–12.5)
SODIUM SERPL-SCNC: 137 MMOL/L (ref 136–145)
TACROLIMUS BLD-MCNC: 36.3 NG/ML (ref 5–15)

## 2024-06-04 PROCEDURE — 80053 COMPREHEN METABOLIC PANEL: CPT | Mod: HCNC

## 2024-06-04 PROCEDURE — 85610 PROTHROMBIN TIME: CPT | Mod: HCNC | Performed by: INTERNAL MEDICINE

## 2024-06-04 PROCEDURE — 85730 THROMBOPLASTIN TIME PARTIAL: CPT | Mod: HCNC | Performed by: INTERNAL MEDICINE

## 2024-06-04 PROCEDURE — 83735 ASSAY OF MAGNESIUM: CPT | Mod: HCNC

## 2024-06-04 PROCEDURE — 36415 COLL VENOUS BLD VENIPUNCTURE: CPT | Mod: HCNC

## 2024-06-04 PROCEDURE — 38222 DX BONE MARROW BX & ASPIR: CPT | Mod: LT,S$GLB,, | Performed by: NURSE PRACTITIONER

## 2024-06-04 PROCEDURE — 99499 UNLISTED E&M SERVICE: CPT | Mod: S$GLB,,, | Performed by: NURSE PRACTITIONER

## 2024-06-04 PROCEDURE — 83880 ASSAY OF NATRIURETIC PEPTIDE: CPT | Mod: HCNC

## 2024-06-04 PROCEDURE — 80197 ASSAY OF TACROLIMUS: CPT | Mod: HCNC | Performed by: INTERNAL MEDICINE

## 2024-06-04 RX ORDER — LIDOCAINE HYDROCHLORIDE 20 MG/ML
8 INJECTION, SOLUTION INFILTRATION; PERINEURAL
Status: COMPLETED | OUTPATIENT
Start: 2024-06-04 | End: 2024-06-04

## 2024-06-04 RX ADMIN — LIDOCAINE HYDROCHLORIDE 8 ML: 20 INJECTION, SOLUTION INFILTRATION; PERINEURAL at 08:06

## 2024-06-04 NOTE — PROCEDURES
Patient arrived for procedure, vitals obtained. Procedure explained, consent obtained, and allergies reviewed. Patient extremely anxious about procedure as he states he does not tolerate pain well. Agreeable to proceed and consent was signed. Able to administer 8cc of lidocaine however patient was unable to tolerate just light pressure with Jamshidi to left iliac crest. Multiple attempts to different areas of the bone tried, however patient still unable to tolerate. Procedure was stopped. Sterile guaze and bandaid were applied to site.    Staff message sent to Dr. De La Cruz and NICKI Clifton NP on how to proceed with patient. Referred for marrow for anemia; hx of renal tx.  First available slot for sedation marrow in endo- 6/26 (I will place case request should this be desired option)  First available clinic marrow to reschedule with premedications for pain/anxiety- 6/11      Lacey Chapman NP  Hematology/Oncology/BMT

## 2024-06-04 NOTE — CARE UPDATE
Case request for endoscopy marrow ordered for 6/26. Once scheduled, patient will be called with appointment time.    Lacey Chapman NP  Hematology/Oncology/BMT

## 2024-06-05 ENCOUNTER — DOCUMENTATION ONLY (OUTPATIENT)
Dept: CARDIOLOGY | Facility: CLINIC | Age: 69
End: 2024-06-05
Payer: MEDICARE

## 2024-06-05 ENCOUNTER — TELEPHONE (OUTPATIENT)
Dept: TRANSPLANT | Facility: CLINIC | Age: 69
End: 2024-06-05
Payer: MEDICARE

## 2024-06-05 ENCOUNTER — TELEPHONE (OUTPATIENT)
Dept: CARDIOLOGY | Facility: CLINIC | Age: 69
End: 2024-06-05
Payer: MEDICARE

## 2024-06-05 ENCOUNTER — LAB VISIT (OUTPATIENT)
Dept: LAB | Facility: HOSPITAL | Age: 69
End: 2024-06-05
Attending: INTERNAL MEDICINE
Payer: MEDICARE

## 2024-06-05 DIAGNOSIS — Z94.0 KIDNEY REPLACED BY TRANSPLANT: ICD-10-CM

## 2024-06-05 LAB — TACROLIMUS BLD-MCNC: 23.3 NG/ML (ref 5–15)

## 2024-06-05 PROCEDURE — 80197 ASSAY OF TACROLIMUS: CPT | Performed by: INTERNAL MEDICINE

## 2024-06-05 PROCEDURE — 36415 COLL VENOUS BLD VENIPUNCTURE: CPT | Performed by: INTERNAL MEDICINE

## 2024-06-05 NOTE — PROGRESS NOTES
"Heart Failure Transitional Care Clinic([facility]) weekly phone follow up / triage call completed.     TCC LPN Navigator spoke with Mr. Ravi Zamorano.    Current Patient reported weight: 180lbs   Patient Goal Weight: (unsure)  Recent Patient reported blood pressure and heart rate: pt did not check his blood pressure or his heart rate.    Pt reports the following:  []  Shortness of Breath with Activity  []  Shortness of Breath at rest   []  Fatigue  [x]  Edema bilateral ankles.  [] Chest pain or tightness  [] Weight Increase since discharge  [] None of the above    Pt reports using "Daily weight and symptom tracker".    Pt reports being in the Yellow (color) Zone. If in yellow/red, reminded that they should be calling [facility] today or now.     Medications:   Medication compliance reviewed with pt.  Pt reports having medication list available and has all medications at home for use per list.     Education:   Confirmed pt still has "Heart Failure Transitional Care Clinic Home Care Guide"  .     Reminded of key points as listed below.     Recommend 2 -3 gram sodium restriction and 1500 cc-2000 cc fluid restriction.  Encourage physical activity with graded exercise program.  Requested patient to weigh themselves daily, and to notify us if their weight increases by more than 3 lbs in 1 day or 5 lbs in 3 days.   Reminded to use "Daily weight and symptom tracker".  Even if pt does not have a scale, to use symptom tracker.       Watch for these Signs and Symptoms: If any of these occur, contact [facility] immediately:   Increase in shortness of breath with movement   Increase in swelling in your legs and ankles   Weight gain of more than 3 pounds in a day or 5 pounds in 3 days.   Difficulty breathing when you are lying down   Worsening fatigue or tiredness   Stomach bloating, a full feeling or a loss of appetite   Increased coughing--especially when you are lying down      Pt was able to verbalize back to N in their own words " correct diet/fluid restrictions, necessity for exercise, warning signs and symptoms, when and how to contact their HFTCC team.      Pt reminded of upcoming appointment.  PT reports they will attend. Weekly phone call next week 6/12/2024.      Pt reminded of how and when to contact [facility]:  [phone] (Mon-Fri, 8a-5p) & for urgent issues on the weekend to page the Heart Transplant MD on call.  Pt also encouraged utilize [facility] messaging as well.      Pt  verbalized understanding and in agreement of plan.       Will follow up with pt at next clinic visit and LPN navigator available for pt questions, issues or concerns.

## 2024-06-05 NOTE — PROGRESS NOTES
"Heart Failure Transitional Care Clinic([facility]) DISCHARGE VISIT - PHONE     Called and spoke to Mr. Ravi Zamorano.     Most Recent Hospital Discharge Date: 5/3/2024  Last admission Diagnosis/chief complaint: SOB    Pt discharge completed by phone related to pt convenience.      Pt reports the following:  []  Shortness of Breath with activity  []  Shortness of Breath at rest   []  Fatigue  [x]  Edema bilateral ankles.   [] Chest pain or tightness  [] Weight Increase since discharge  [] None of the above    Medications:   Medication reconciliation completed today per LPN.  Pt reports having all medications available and understands how to take them appropriately. Reminded pt to call prior to making any changes to medications.     Education:   [x] Confirmed pt still has  "Heart Failure Transitional Care Clinic Home Care Guide" .   Reviewed key points as listed below.     Recommend 2 gram sodium restriction and 1500cc fluid restriction.  Encourage physical activity with graded exercise program.  Requested patient to weigh themselves daily, and to notify us if their weight increases by more than 3 lbs in 1 day or 5 lbs in 3 days.     [x] Reviewed completed "Daily Weight and Symptom Tracker".  Reviewed with patient when and how to call HFTCC according to "Yellow Zone" and "Red Zone".       Watch for these Signs and Symptoms: If any of these occur, contact [facility] immediately:   Increase in shortness of breath with movement   Increase in swelling in your legs and ankles   Weight gain of more than 3 pounds in a night or 5 pounds in 3 days.   Difficulty breathing when you are lying down   Worsening fatigue or tiredness   Stomach bloating, a full feeling or a loss of appetite   Increased coughing--especially when you are lying down    MyChart and Care Companion:   Patient active on myChart? No, pt not interested or does not have supportive technology.    HF TCC Program Plan:  Pt has successfully completed HFTCC program.  Pt " care to be transferred to General Cardiology for long term care with Nayely Vital PA-C.     Pt educated on how to call their offices and how to call [facility] On call in the event of an after hour issue.    PT reminded to continue to follow recommendations made during the HFTCC program to include monitoring daily weights, taking medications according to list, following up to appointments per provider recommendations, stop smoking/ start exercising and following a heart friendly low salt, low fluid diet.      Pt was able to verbalize back to RN in their own words correct diet/fluid restrictions, necessity for exercise, warning signs and symptoms, when and how to contact their  Long term care team .      Plan:     Continue long term cardiology care with General Cardiology with Nayely Vital PA-C.    [x]  Discussed upcoming appointments and/or plan for follow-up care with his/her PCP/Cardiology. Appt is 6/19/2024 with general cardiology.    All notes created by Paris Lujan PA-C are available to providers with in our system.     Please refer to provider note for additional details and assessment.

## 2024-06-05 NOTE — TELEPHONE ENCOUNTER
Pt was instructed to hold Prograf and repeat a level on Friday.  Asked pt not to restart the prograf until he hears back from us after labs. Patient stated understanding.     ----- Message from Bola Sosa MD sent at 6/5/2024 12:15 PM CDT -----  Hold prograf completely and repeat a level on Friday

## 2024-06-07 ENCOUNTER — LAB VISIT (OUTPATIENT)
Dept: LAB | Facility: HOSPITAL | Age: 69
End: 2024-06-07
Payer: MEDICARE

## 2024-06-07 DIAGNOSIS — Z94.0 KIDNEY REPLACED BY TRANSPLANT: Primary | ICD-10-CM

## 2024-06-07 DIAGNOSIS — Z94.0 KIDNEY REPLACED BY TRANSPLANT: ICD-10-CM

## 2024-06-07 LAB — TACROLIMUS BLD-MCNC: 9.8 NG/ML (ref 5–15)

## 2024-06-07 PROCEDURE — 36415 COLL VENOUS BLD VENIPUNCTURE: CPT | Performed by: INTERNAL MEDICINE

## 2024-06-07 PROCEDURE — 80197 ASSAY OF TACROLIMUS: CPT | Performed by: INTERNAL MEDICINE

## 2024-06-07 RX ORDER — TACROLIMUS 0.5 MG/1
0.5 CAPSULE ORAL EVERY 12 HOURS
Qty: 60 CAPSULE | Refills: 11 | Status: ON HOLD | OUTPATIENT
Start: 2024-06-07 | End: 2024-06-16

## 2024-06-07 NOTE — TELEPHONE ENCOUNTER
Spoke to pt and instructed to start Prograf 0.5 mg twice a day and repeat a level on Wednesday 6/12.  Pt repeated the instructions back correctly and verbalized understanding.      ----- Message from Bola Sosa MD sent at 6/7/2024 11:59 AM CDT -----  Lower prograf to 0.5 mg po Bid and repeat a level next week

## 2024-06-10 ENCOUNTER — ANESTHESIA EVENT (OUTPATIENT)
Dept: MEDSURG UNIT | Facility: HOSPITAL | Age: 69
End: 2024-06-10
Payer: MEDICARE

## 2024-06-10 NOTE — ANESTHESIA PREPROCEDURE EVALUATION
06/10/2024  Ravi Zamorano is a 68 y.o., male.  Patient Active Problem List   Diagnosis    Type 2 diabetes mellitus with stage 3a chronic kidney disease, with long-term current use of insulin    Diabetic polyneuropathy associated with type 2 diabetes mellitus    Hypertension since 2000    Hyperlipidemia    Depression - situational    Anxiety    Prophylactic immunotherapy    Chronic diastolic congestive heart failure    S/P cadaveric kidney transplant 11/5/2016. ESRD secondary to HTN/DMII    Adverse effect of glucocorticoid or synthetic analogue    Adverse effect of immunosuppressive drug    Immunocompromised state due to drug therapy    Pancytopenia    Stage 3 chronic kidney disease    Type 2 diabetes mellitus with both eyes affected by moderate nonproliferative retinopathy without macular edema, without long-term current use of insulin    Persistent atrial fibrillation    Anticoagulant long-term use    Encounter for monitoring flecainide therapy    Peyronie's disease    Erectile dysfunction due to arterial insufficiency    BPH with urinary obstruction    History of colon polyps    COVID-19 virus infection    PAD (peripheral artery disease)    Claudication of both lower extremities    Paroxysmal A-fib    Immunodeficiency, unspecified    Thrombocytopenia, unspecified    H. pylori infection    Symptomatic anemia    Kidney replaced by transplant           Pre-op Assessment          Review of Systems      Physical Exam    Airway:  No airway management difficulties anticipated  Dental:  No active dental issues noted  Chest/Lungs:  Clear to auscultation    Heart:  Rate: Normal  Rhythm: Regular Rhythm  Sounds: Normal        Anesthesia Plan  Type of Anesthesia, risks & benefits discussed:    Anesthesia Type: Gen ETT  Intra-op Monitoring Plan: Art Line  Informed Consent: Informed consent signed with the Patient and  all parties understand the risks and agree with anesthesia plan.  All questions answered.   ASA Score: 3  Anesthesia Plan Notes: Chart reviewed. Patient seen and examined. Anesthesia plan discussed and questions answered. E-consent signed. Franky Tam MD    Ready For Surgery From Anesthesia Perspective.     .

## 2024-06-11 ENCOUNTER — HOSPITAL ENCOUNTER (OUTPATIENT)
Dept: CARDIOLOGY | Facility: HOSPITAL | Age: 69
Discharge: HOME | End: 2024-06-11
Attending: INTERNAL MEDICINE | Admitting: INTERNAL MEDICINE
Payer: MEDICARE

## 2024-06-11 ENCOUNTER — ANESTHESIA (OUTPATIENT)
Dept: MEDSURG UNIT | Facility: HOSPITAL | Age: 69
End: 2024-06-11
Payer: MEDICARE

## 2024-06-11 ENCOUNTER — HOSPITAL ENCOUNTER (OUTPATIENT)
Facility: HOSPITAL | Age: 69
Discharge: HOME | End: 2024-06-11
Attending: INTERNAL MEDICINE | Admitting: INTERNAL MEDICINE
Payer: MEDICARE

## 2024-06-11 VITALS
DIASTOLIC BLOOD PRESSURE: 99 MMHG | BODY MASS INDEX: 23.86 KG/M2 | OXYGEN SATURATION: 96 % | TEMPERATURE: 98 F | HEART RATE: 84 BPM | WEIGHT: 180 LBS | SYSTOLIC BLOOD PRESSURE: 155 MMHG | RESPIRATION RATE: 16 BRPM | HEIGHT: 73 IN

## 2024-06-11 VITALS
SYSTOLIC BLOOD PRESSURE: 198 MMHG | HEIGHT: 73 IN | WEIGHT: 180 LBS | BODY MASS INDEX: 23.86 KG/M2 | DIASTOLIC BLOOD PRESSURE: 105 MMHG

## 2024-06-11 DIAGNOSIS — I48.0 PAROXYSMAL A-FIB: ICD-10-CM

## 2024-06-11 DIAGNOSIS — R06.09 DOE (DYSPNEA ON EXERTION): ICD-10-CM

## 2024-06-11 DIAGNOSIS — Z86.79 STATUS POST RADIOFREQUENCY ABLATION (RFA) OPERATION FOR ARRHYTHMIA: ICD-10-CM

## 2024-06-11 DIAGNOSIS — I48.91 NEW ONSET A-FIB: ICD-10-CM

## 2024-06-11 DIAGNOSIS — I48.91 ATRIAL FIBRILLATION: ICD-10-CM

## 2024-06-11 DIAGNOSIS — Z98.890 STATUS POST RADIOFREQUENCY ABLATION (RFA) OPERATION FOR ARRHYTHMIA: ICD-10-CM

## 2024-06-11 DIAGNOSIS — I49.9 ARRHYTHMIA: ICD-10-CM

## 2024-06-11 LAB
ANION GAP SERPL CALC-SCNC: 8 MMOL/L (ref 8–16)
ANISOCYTOSIS BLD QL SMEAR: SLIGHT
ASCENDING AORTA: 3.7 CM
BASOPHILS NFR BLD: 0 % (ref 0–1.9)
BSA FOR ECHO PROCEDURE: 2.05 M2
BUN SERPL-MCNC: 25 MG/DL (ref 8–23)
BURR CELLS BLD QL SMEAR: ABNORMAL
CALCIUM SERPL-MCNC: 7.9 MG/DL (ref 8.7–10.5)
CHLORIDE SERPL-SCNC: 107 MMOL/L (ref 95–110)
CO2 SERPL-SCNC: 22 MMOL/L (ref 23–29)
CREAT SERPL-MCNC: 1.6 MG/DL (ref 0.5–1.4)
DACRYOCYTES BLD QL SMEAR: ABNORMAL
DIFFERENTIAL METHOD BLD: ABNORMAL
EOSINOPHIL NFR BLD: 0 % (ref 0–8)
ERYTHROCYTE [DISTWIDTH] IN BLOOD BY AUTOMATED COUNT: 15 % (ref 11.5–14.5)
EST. GFR  (NO RACE VARIABLE): 47.2 ML/MIN/1.73 M^2
GLUCOSE SERPL-MCNC: 157 MG/DL (ref 70–110)
HCT VFR BLD AUTO: 29 % (ref 40–54)
HGB BLD-MCNC: 7.9 G/DL (ref 14–18)
HYPOCHROMIA BLD QL SMEAR: ABNORMAL
IMM GRANULOCYTES # BLD AUTO: ABNORMAL K/UL (ref 0–0.04)
IMM GRANULOCYTES NFR BLD AUTO: ABNORMAL % (ref 0–0.5)
LAA PV: 25 CM/S
LYMPHOCYTES NFR BLD: 4 % (ref 18–48)
MCH RBC QN AUTO: 23.4 PG (ref 27–31)
MCHC RBC AUTO-ENTMCNC: 27.2 G/DL (ref 32–36)
MCV RBC AUTO: 86 FL (ref 82–98)
MONOCYTES NFR BLD: 3 % (ref 4–15)
NEUTROPHILS NFR BLD: 93 % (ref 38–73)
NRBC BLD-RTO: 0 /100 WBC
OHS QRS DURATION: 100 MS
OHS QRS DURATION: 102 MS
OHS QTC CALCULATION: 409 MS
OHS QTC CALCULATION: 489 MS
OVALOCYTES BLD QL SMEAR: ABNORMAL
PLATELET # BLD AUTO: 102 K/UL (ref 150–450)
PLATELET BLD QL SMEAR: ABNORMAL
PMV BLD AUTO: ABNORMAL FL (ref 9.2–12.9)
POC ACTIVATED CLOTTING TIME K: 159 SEC (ref 74–137)
POC ACTIVATED CLOTTING TIME K: 165 SEC (ref 74–137)
POC ACTIVATED CLOTTING TIME K: 336 SEC (ref 74–137)
POC ACTIVATED CLOTTING TIME K: 348 SEC (ref 74–137)
POC ACTIVATED CLOTTING TIME K: 379 SEC (ref 74–137)
POC ACTIVATED CLOTTING TIME K: 385 SEC (ref 74–137)
POCT GLUCOSE: 166 MG/DL (ref 70–110)
POCT GLUCOSE: 178 MG/DL (ref 70–110)
POCT GLUCOSE: 205 MG/DL (ref 70–110)
POIKILOCYTOSIS BLD QL SMEAR: SLIGHT
POTASSIUM SERPL-SCNC: 3.8 MMOL/L (ref 3.5–5.1)
RBC # BLD AUTO: 3.37 M/UL (ref 4.6–6.2)
SAMPLE: ABNORMAL
SINUS: 3.5 CM
SODIUM SERPL-SCNC: 137 MMOL/L (ref 136–145)
STJ: 3.3 CM
WBC # BLD AUTO: 3.33 K/UL (ref 3.9–12.7)

## 2024-06-11 PROCEDURE — C1753 CATH, INTRAVAS ULTRASOUND: HCPCS | Performed by: INTERNAL MEDICINE

## 2024-06-11 PROCEDURE — 93005 ELECTROCARDIOGRAM TRACING: CPT

## 2024-06-11 PROCEDURE — C1769 GUIDE WIRE: HCPCS | Performed by: INTERNAL MEDICINE

## 2024-06-11 PROCEDURE — 25000003 PHARM REV CODE 250: Performed by: INTERNAL MEDICINE

## 2024-06-11 PROCEDURE — 93655 ICAR CATH ABLTJ DSCRT ARRHYT: CPT | Mod: ,,, | Performed by: INTERNAL MEDICINE

## 2024-06-11 PROCEDURE — C1894 INTRO/SHEATH, NON-LASER: HCPCS | Performed by: INTERNAL MEDICINE

## 2024-06-11 PROCEDURE — 63600175 PHARM REV CODE 636 W HCPCS: Performed by: INTERNAL MEDICINE

## 2024-06-11 PROCEDURE — 93325 DOPPLER ECHO COLOR FLOW MAPG: CPT | Mod: 26,,, | Performed by: INTERNAL MEDICINE

## 2024-06-11 PROCEDURE — 27201037 HC PRESSURE MONITORING SET UP

## 2024-06-11 PROCEDURE — 93656 COMPRE EP EVAL ABLTJ ATR FIB: CPT | Mod: ,,, | Performed by: INTERNAL MEDICINE

## 2024-06-11 PROCEDURE — 92960 CARDIOVERSION ELECTRIC EXT: CPT | Mod: 59,,, | Performed by: INTERNAL MEDICINE

## 2024-06-11 PROCEDURE — D9220A PRA ANESTHESIA: Mod: CRNA,,, | Performed by: NURSE ANESTHETIST, CERTIFIED REGISTERED

## 2024-06-11 PROCEDURE — C1730 CATH, EP, 19 OR FEW ELECT: HCPCS | Performed by: INTERNAL MEDICINE

## 2024-06-11 PROCEDURE — 93005 ELECTROCARDIOGRAM TRACING: CPT | Mod: 59

## 2024-06-11 PROCEDURE — 93312 ECHO TRANSESOPHAGEAL: CPT

## 2024-06-11 PROCEDURE — 85027 COMPLETE CBC AUTOMATED: CPT | Performed by: INTERNAL MEDICINE

## 2024-06-11 PROCEDURE — 80048 BASIC METABOLIC PNL TOTAL CA: CPT | Performed by: INTERNAL MEDICINE

## 2024-06-11 PROCEDURE — 25000003 PHARM REV CODE 250: Performed by: STUDENT IN AN ORGANIZED HEALTH CARE EDUCATION/TRAINING PROGRAM

## 2024-06-11 PROCEDURE — 63600175 PHARM REV CODE 636 W HCPCS: Performed by: NURSE ANESTHETIST, CERTIFIED REGISTERED

## 2024-06-11 PROCEDURE — 92960 CARDIOVERSION ELECTRIC EXT: CPT | Mod: 59 | Performed by: INTERNAL MEDICINE

## 2024-06-11 PROCEDURE — 93010 ELECTROCARDIOGRAM REPORT: CPT | Mod: ,,, | Performed by: INTERNAL MEDICINE

## 2024-06-11 PROCEDURE — 63600175 PHARM REV CODE 636 W HCPCS: Performed by: ANESTHESIOLOGY

## 2024-06-11 PROCEDURE — 93320 DOPPLER ECHO COMPLETE: CPT | Mod: 26,,, | Performed by: INTERNAL MEDICINE

## 2024-06-11 PROCEDURE — 25000003 PHARM REV CODE 250: Performed by: ANESTHESIOLOGY

## 2024-06-11 PROCEDURE — 27201423 OPTIME MED/SURG SUP & DEVICES STERILE SUPPLY: Performed by: INTERNAL MEDICINE

## 2024-06-11 PROCEDURE — 85007 BL SMEAR W/DIFF WBC COUNT: CPT | Performed by: INTERNAL MEDICINE

## 2024-06-11 PROCEDURE — 93657 TX L/R ATRIAL FIB ADDL: CPT | Mod: ,,, | Performed by: INTERNAL MEDICINE

## 2024-06-11 PROCEDURE — 93656 COMPRE EP EVAL ABLTJ ATR FIB: CPT | Performed by: INTERNAL MEDICINE

## 2024-06-11 PROCEDURE — 93655 ICAR CATH ABLTJ DSCRT ARRHYT: CPT | Performed by: INTERNAL MEDICINE

## 2024-06-11 PROCEDURE — 93312 ECHO TRANSESOPHAGEAL: CPT | Mod: 26,,, | Performed by: INTERNAL MEDICINE

## 2024-06-11 PROCEDURE — 37000009 HC ANESTHESIA EA ADD 15 MINS: Performed by: INTERNAL MEDICINE

## 2024-06-11 PROCEDURE — C1766 INTRO/SHEATH,STRBLE,NON-PEEL: HCPCS | Performed by: INTERNAL MEDICINE

## 2024-06-11 PROCEDURE — D9220A PRA ANESTHESIA: Mod: ANES,,, | Performed by: ANESTHESIOLOGY

## 2024-06-11 PROCEDURE — 37000008 HC ANESTHESIA 1ST 15 MINUTES: Performed by: INTERNAL MEDICINE

## 2024-06-11 PROCEDURE — 82962 GLUCOSE BLOOD TEST: CPT | Performed by: INTERNAL MEDICINE

## 2024-06-11 PROCEDURE — 36620 INSERTION CATHETER ARTERY: CPT | Mod: 59,,, | Performed by: ANESTHESIOLOGY

## 2024-06-11 PROCEDURE — C1732 CATH, EP, DIAG/ABL, 3D/VECT: HCPCS | Performed by: INTERNAL MEDICINE

## 2024-06-11 PROCEDURE — 93657 TX L/R ATRIAL FIB ADDL: CPT | Performed by: INTERNAL MEDICINE

## 2024-06-11 PROCEDURE — 25000003 PHARM REV CODE 250: Performed by: NURSE ANESTHETIST, CERTIFIED REGISTERED

## 2024-06-11 RX ORDER — FENTANYL CITRATE 50 UG/ML
INJECTION, SOLUTION INTRAMUSCULAR; INTRAVENOUS
Status: DISCONTINUED | OUTPATIENT
Start: 2024-06-11 | End: 2024-06-11

## 2024-06-11 RX ORDER — PROPOFOL 10 MG/ML
VIAL (ML) INTRAVENOUS
Status: DISCONTINUED | OUTPATIENT
Start: 2024-06-11 | End: 2024-06-11

## 2024-06-11 RX ORDER — PHENYLEPHRINE HYDROCHLORIDE 10 MG/ML
INJECTION INTRAVENOUS
Status: DISCONTINUED | OUTPATIENT
Start: 2024-06-11 | End: 2024-06-11

## 2024-06-11 RX ORDER — HEPARIN SOD,PORCINE/0.9 % NACL 1000/500ML
INTRAVENOUS SOLUTION INTRAVENOUS
Status: DISCONTINUED | OUTPATIENT
Start: 2024-06-11 | End: 2024-06-11

## 2024-06-11 RX ORDER — DRONEDARONE 400 MG/1
400 TABLET, FILM COATED ORAL 2 TIMES DAILY WITH MEALS
Qty: 60 TABLET | Refills: 11 | Status: SHIPPED | OUTPATIENT
Start: 2024-06-11 | End: 2024-06-11 | Stop reason: HOSPADM

## 2024-06-11 RX ORDER — CEFAZOLIN SODIUM 1 G/3ML
INJECTION, POWDER, FOR SOLUTION INTRAMUSCULAR; INTRAVENOUS
Status: DISCONTINUED | OUTPATIENT
Start: 2024-06-11 | End: 2024-06-11

## 2024-06-11 RX ORDER — FUROSEMIDE 10 MG/ML
40 INJECTION INTRAMUSCULAR; INTRAVENOUS ONCE
Status: DISCONTINUED | OUTPATIENT
Start: 2024-06-11 | End: 2024-06-11

## 2024-06-11 RX ORDER — MIDAZOLAM HYDROCHLORIDE 1 MG/ML
INJECTION INTRAMUSCULAR; INTRAVENOUS
Status: DISCONTINUED | OUTPATIENT
Start: 2024-06-11 | End: 2024-06-11

## 2024-06-11 RX ORDER — SUCCINYLCHOLINE CHLORIDE 20 MG/ML
INJECTION INTRAMUSCULAR; INTRAVENOUS
Status: DISCONTINUED | OUTPATIENT
Start: 2024-06-11 | End: 2024-06-11

## 2024-06-11 RX ORDER — CARVEDILOL 12.5 MG/1
25 TABLET ORAL ONCE
Status: COMPLETED | OUTPATIENT
Start: 2024-06-11 | End: 2024-06-11

## 2024-06-11 RX ORDER — LIDOCAINE HYDROCHLORIDE 20 MG/ML
INJECTION, SOLUTION INFILTRATION; PERINEURAL
Status: DISCONTINUED | OUTPATIENT
Start: 2024-06-11 | End: 2024-06-11

## 2024-06-11 RX ORDER — CARVEDILOL 25 MG/1
25 TABLET ORAL 2 TIMES DAILY
Qty: 180 TABLET | Refills: 3 | Status: SHIPPED | OUTPATIENT
Start: 2024-06-11 | End: 2025-06-06

## 2024-06-11 RX ORDER — ACETAMINOPHEN 325 MG/1
650 TABLET ORAL EVERY 4 HOURS PRN
Status: DISCONTINUED | OUTPATIENT
Start: 2024-06-11 | End: 2024-06-11 | Stop reason: HOSPADM

## 2024-06-11 RX ORDER — FUROSEMIDE 10 MG/ML
40 INJECTION INTRAMUSCULAR; INTRAVENOUS ONCE
Status: COMPLETED | OUTPATIENT
Start: 2024-06-11 | End: 2024-06-11

## 2024-06-11 RX ORDER — DIPHENHYDRAMINE HYDROCHLORIDE 50 MG/ML
25 INJECTION INTRAMUSCULAR; INTRAVENOUS EVERY 6 HOURS PRN
Status: DISCONTINUED | OUTPATIENT
Start: 2024-06-11 | End: 2024-06-11 | Stop reason: HOSPADM

## 2024-06-11 RX ORDER — FENTANYL CITRATE 50 UG/ML
25 INJECTION, SOLUTION INTRAMUSCULAR; INTRAVENOUS EVERY 5 MIN PRN
Status: DISCONTINUED | OUTPATIENT
Start: 2024-06-11 | End: 2024-06-11 | Stop reason: HOSPADM

## 2024-06-11 RX ORDER — HEPARIN SODIUM 10000 [USP'U]/100ML
INJECTION, SOLUTION INTRAVENOUS CONTINUOUS PRN
Status: DISCONTINUED | OUTPATIENT
Start: 2024-06-11 | End: 2024-06-11

## 2024-06-11 RX ORDER — HYDROMORPHONE HYDROCHLORIDE 1 MG/ML
0.2 INJECTION, SOLUTION INTRAMUSCULAR; INTRAVENOUS; SUBCUTANEOUS EVERY 5 MIN PRN
Status: DISCONTINUED | OUTPATIENT
Start: 2024-06-11 | End: 2024-06-11 | Stop reason: HOSPADM

## 2024-06-11 RX ORDER — FLECAINIDE ACETATE 50 MG/1
50 TABLET ORAL EVERY 12 HOURS
Qty: 60 TABLET | Refills: 11 | Status: SHIPPED | OUTPATIENT
Start: 2024-06-11 | End: 2024-06-11 | Stop reason: HOSPADM

## 2024-06-11 RX ORDER — PANTOPRAZOLE SODIUM 40 MG/1
40 TABLET, DELAYED RELEASE ORAL DAILY
Qty: 30 TABLET | Refills: 0 | Status: SHIPPED | OUTPATIENT
Start: 2024-06-11 | End: 2024-07-11

## 2024-06-11 RX ORDER — ONDANSETRON HYDROCHLORIDE 2 MG/ML
4 INJECTION, SOLUTION INTRAVENOUS ONCE AS NEEDED
Status: DISCONTINUED | OUTPATIENT
Start: 2024-06-11 | End: 2024-06-11 | Stop reason: HOSPADM

## 2024-06-11 RX ORDER — HEPARIN SODIUM 1000 [USP'U]/ML
INJECTION, SOLUTION INTRAVENOUS; SUBCUTANEOUS
Status: DISCONTINUED | OUTPATIENT
Start: 2024-06-11 | End: 2024-06-11

## 2024-06-11 RX ADMIN — PHENYLEPHRINE HYDROCHLORIDE 0.3 MCG/KG/MIN: 10 INJECTION INTRAVENOUS at 07:06

## 2024-06-11 RX ADMIN — SODIUM CHLORIDE: 0.9 INJECTION, SOLUTION INTRAVENOUS at 07:06

## 2024-06-11 RX ADMIN — FUROSEMIDE 40 MG: 10 INJECTION, SOLUTION INTRAMUSCULAR; INTRAVENOUS at 02:06

## 2024-06-11 RX ADMIN — HEPARIN SODIUM 2000 UNITS: 1000 INJECTION, SOLUTION INTRAVENOUS; SUBCUTANEOUS at 09:06

## 2024-06-11 RX ADMIN — MIDAZOLAM HYDROCHLORIDE 2 MG: 2 INJECTION, SOLUTION INTRAMUSCULAR; INTRAVENOUS at 07:06

## 2024-06-11 RX ADMIN — HEPARIN SODIUM 3000 UNITS: 1000 INJECTION, SOLUTION INTRAVENOUS; SUBCUTANEOUS at 09:06

## 2024-06-11 RX ADMIN — CEFAZOLIN 2 G: 330 INJECTION, POWDER, FOR SOLUTION INTRAMUSCULAR; INTRAVENOUS at 07:06

## 2024-06-11 RX ADMIN — ACETAMINOPHEN 650 MG: 325 TABLET ORAL at 12:06

## 2024-06-11 RX ADMIN — SUCCINYLCHOLINE CHLORIDE 140 MG: 20 INJECTION, SOLUTION INTRAMUSCULAR; INTRAVENOUS at 07:06

## 2024-06-11 RX ADMIN — CARVEDILOL 25 MG: 12.5 TABLET, FILM COATED ORAL at 11:06

## 2024-06-11 RX ADMIN — HYDROMORPHONE HYDROCHLORIDE 0.2 MG: 1 INJECTION, SOLUTION INTRAMUSCULAR; INTRAVENOUS; SUBCUTANEOUS at 12:06

## 2024-06-11 RX ADMIN — SODIUM CHLORIDE, SODIUM GLUCONATE, SODIUM ACETATE, POTASSIUM CHLORIDE, MAGNESIUM CHLORIDE, SODIUM PHOSPHATE, DIBASIC, AND POTASSIUM PHOSPHATE: .53; .5; .37; .037; .03; .012; .00082 INJECTION, SOLUTION INTRAVENOUS at 08:06

## 2024-06-11 RX ADMIN — PROPOFOL 200 MG: 10 INJECTION, EMULSION INTRAVENOUS at 07:06

## 2024-06-11 RX ADMIN — PHENYLEPHRINE HYDROCHLORIDE 100 MCG: 10 INJECTION INTRAVENOUS at 08:06

## 2024-06-11 RX ADMIN — HYDROMORPHONE HYDROCHLORIDE 0.2 MG: 1 INJECTION, SOLUTION INTRAMUSCULAR; INTRAVENOUS; SUBCUTANEOUS at 01:06

## 2024-06-11 RX ADMIN — FENTANYL CITRATE 100 MCG: 50 INJECTION, SOLUTION INTRAMUSCULAR; INTRAVENOUS at 07:06

## 2024-06-11 RX ADMIN — HEPARIN SODIUM 14000 UNITS: 1000 INJECTION, SOLUTION INTRAVENOUS; SUBCUTANEOUS at 08:06

## 2024-06-11 RX ADMIN — HEPARIN SODIUM AND DEXTROSE 12 UNITS/KG/HR: 10000; 5 INJECTION INTRAVENOUS at 08:06

## 2024-06-11 NOTE — PLAN OF CARE
Received via stretcher from EP PACU.  Report from EP, RN.  Awake alert and oriented.  Denies pain or SOB.  VSS.  Pt family called to bedside.  Food and drinks provided.  Right and left groins with stopcocks and sutures intact.  Right and left groins soft.  No bleeding or hematoma noted.  Pt oriented to unit and call bell provided.  Condom cath in place without drainage noted.  Pt family placed patient partial/denture back in pt mouth.  Pt CBG = 205 on arrival.  Will continue to monitor.

## 2024-06-11 NOTE — HPI
68 year old male with a history of DM on insulin, CKD2, history of kidney transplant in 2016 (Cr 1.5 in 2/2021), and diastolic dysfunction, who was diagnosed with rate controlled AF in 8/2019, manifesting as worsening DOSS. An echo done in 8/2019 showed an EF of 55% and severe LAE (MINA 54.3 mL/m2). He underwent DCCV in 8/2010, and was started on flecainide 100 mg bid post-procedure.     An echo in 6/2021 showed an EF of 55%. A 2 week [employer] DX monitor done in 7/2019 showed no arrhythmias.     At his 4/2023 visit, he was maintaining sinus rhythm but reporting more LH and DOSS. QRS wide on ECG--flecainide reduced to 50 mg bid. By 5/2023 QRS had narrowed, although he had an episode of syncope in 5/2023 thought to be due to hypotension 2/2 chlorthalidone.      SPECT 5/15/2023 negative for ischemia or scar. Event monitor 1/2024--one episode of AF noted 12/24/2023, and episodes of SR and junctional rhythm with HR ranging from 54-74.     At 2/2024 pt was in sinus rhythm. Reported chronic DOSS. Episodes of LH when hypotensive. Creatinine of 1.7 in 11/2023.  ms.     Pt seen by cardiology in 4/2024--back in AF. Presented to ED in 5/2024 with rate controlled AF and worsening HF sx..      Admitted in 4/2024 for anemia (Hg 5.7). GI thought it was low likelihood this was 2/2 GIB. Awaiting outpatient hematology input. Back on eliquis. Hg stable over the past month or so.     ADHF admission on 5/2024.      My interpretation of today's ECG is

## 2024-06-11 NOTE — PROGRESS NOTES
Anel MCGOWAN with anesthesia notified about patient's BP of 178/86. MD orders home dose of carvedilol at this time. CHRIS.

## 2024-06-11 NOTE — BRIEF OP NOTE
: Bronson Bowden MD  Date of Procedure: 06/11/2024    Post-operative Diagnosis: AF/AFL    Procedure Performed: Pulmonary vein isolation of all 4 pulmonary veins & CTI ablations     Description of Procedure: The patient was brought to the EP lab in the fasting state. Prepped and draped in sterile fashion. Safety timeout was performed. Sedation administered by anesthesia staff. Ultrasound guided venous access of the bilateral femoral veins was performed. ICE and CS catheters placed via left femoral vein access. Halo catheter placed via right femoral vein. RFA to the CTI with confirmation of bidirectional block. Heparin bolus and continuous infusion per protocol. Long sheaths via right femoral venous access. Two transseptal punctures performed using combination of fluoroscopic and ICE guidance. Map created. RFA to isolate all pulmonary veins. ICE confirmed no significant pericardial effusion.     EBL: <10 mL    Specimens: none  Complications: no immediate    Plan:  Bedrest x 4 hrs  Remove sutures at 3 hours post-procedure  Ambulation at 4 hours post-procedure if there is no evidence of access site complications  Lasix 40mg IV x1 when ambulating  Close monitoring of hemodynamics, access site, and neurologic status  ECG upon arrival to PACU  Patient to resume OAC this evening. If bleeding or hematoma formation, please notify EP service before holding OAC  Medication changes: Initiation of Protonix 40 mg QD x 30 days & Lasix 20 mg PRN swelling/SOB  Recommend ibuprofen 800 mg TID x 3 days for pericarditis post-procedure  Plan for discharge following bedrest if patient tolerating PO intake, voiding, and ambulatory without evidence of complications     Lisandro Jauregui MD  Cardiovascular Disease PGY-V  [facility]

## 2024-06-11 NOTE — TRANSFER OF CARE
"Anesthesia Transfer of Care Note    Patient: Ravi Zamorano    Procedure(s) Performed: Procedure(s) (LRB):  Ablation atrial fibrillation (N/A)  ECHOCARDIOGRAM, TRANSESOPHAGEAL (N/A)  Cardioversion  Ablation, Atrial Flutter, Typical    Patient location: PACU    Anesthesia Type: general    Transport from OR: Transported from OR on 6-10 L/min O2 by face mask with adequate spontaneous ventilation    Post pain: adequate analgesia    Post assessment: no apparent anesthetic complications and tolerated procedure well    Post vital signs: stable    Level of consciousness: awake and responds to stimulation    Complications: none    Transfer of care protocol was followed      Last vitals: Visit Vitals  BP (!) 198/105 (BP Location: Left arm, Patient Position: Lying)   Pulse 79   Temp 36.9 °C (98.4 °F) (Temporal)   Resp 16   Ht 6' 1" (1.854 m)   Wt 81.6 kg (180 lb)   SpO2 99%   BMI 23.75 kg/m²     "

## 2024-06-11 NOTE — CONSULTS
[facility], [place]  FELICIANO Consult      Ravi Zamorano  YOB: 1955  Medical Record Number:  [id number]  Attending Physician:  Bronson Bowden MD   Current Principal Problem:  <principal problem not specified>    History         HPI  68 year old male with a history of DM on insulin, CKD2, history of kidney transplant in 2016 (Cr 1.5 in 2/2021), and diastolic dysfunction, who was diagnosed with rate controlled AF in 8/2019, manifesting as worsening DOSS. An echo done in 8/2019 showed an EF of 55% and severe LAE (MINA 54.3 mL/m2). He underwent DCCV in 8/2010, and was started on flecainide 100 mg bid post-procedure.     An echo in 6/2021 showed an EF of 55%. A 2 week [employer] DX monitor done in 7/2019 showed no arrhythmias.     At his 4/2023 visit, he was maintaining sinus rhythm but reporting more LH and DOSS. QRS wide on ECG--flecainide reduced to 50 mg bid. By 5/2023 QRS had narrowed, although he had an episode of syncope in 5/2023 thought to be due to hypotension 2/2 chlorthalidone.      SPECT 5/15/2023 negative for ischemia or scar. Event monitor 1/2024--one episode of AF noted 12/24/2023, and episodes of SR and junctional rhythm with HR ranging from 54-74.     At 2/2024 pt was in sinus rhythm. Reported chronic DOSS. Episodes of LH when hypotensive. Creatinine of 1.7 in 11/2023.  ms.     Pt seen by cardiology in 4/2024--back in AF. Presented to ED in 5/2024 with rate controlled AF and worsening HF sx..      Admitted in 4/2024 for anemia (Hg 5.7). GI thought it was low likelihood this was 2/2 GIB. Awaiting outpatient hematology input. Back on eliquis. Hg stable over the past month or so.     ADHF admission on 5/2024.     Dysphagia or odynophagia:  No  Liver Disease, esophageal disease, or known varices:  No  Upper GI Bleeding: No  Snoring:  No  Sleep Apnea:  No  Prior neck surgery or radiation:  No  History of anesthetic difficulties:  No  Family history of anesthetic difficulties:   No  Last oral intake: yesterday before midnight  Able to move neck in all directions:  Yes    Medications - Outpatient  Prior to Admission medications    Medication Sig Start Date End Date Taking? Authorizing Provider   apixaban (ELIQUIS) 5 mg Tab Take 1 tablet (5 mg total) by mouth 2 (two) times daily. 4/12/24  Yes Nilda Cruz MD   aspirin 81 MG Chew Take 81 mg by mouth once daily.   Yes Provider, Historical   atorvastatin (LIPITOR) 40 MG tablet Take 1 tablet (40 mg total) by mouth once daily. 4/1/24  Yes Mima Mack MD   carvediloL (COREG) 25 MG tablet Take 2 tablets (50 mg total) by mouth 2 (two) times daily. 5/21/24 5/16/25 Yes Nayely Vital PA-C   empagliflozin (JARDIANCE) 10 mg tablet Take 1 tablet (10 mg total) by mouth once daily. 1/22/24  Yes Mima Mack MD   famotidine (PEPCID) 20 MG tablet Take 1 tablet (20 mg total) by mouth every evening. 11/7/17  Yes Mima Mack MD   furosemide (LASIX) 20 MG tablet Take 1 tablet (20 mg total) by mouth 2 (two) times a day. 5/21/24 5/21/25 Yes Nayely Vital PA-C   gabapentin (NEURONTIN) 300 MG capsule Take 1 capsule by mouth in the morning, 1 capsule midday, and 3 capsules at night. 8/4/23  Yes Mima Mack MD   insulin aspart U-100 (NOVOLOG) 100 unit/mL (3 mL) InPn pen Inject 16 units w/ breakfast, 10 units at lunch and dinner scale 180-230+2, 231-280+4, 281-330+6, 331-380+8, >380+10.  *Max daily 66 units.* 3/14/24  Yes Karan Lopez, KEO CHAMBERS   insulin glargine U-100, Lantus, (LANTUS SOLOSTAR U-100 INSULIN) 100 unit/mL (3 mL) InPn pen Inject 20 Units into the skin every morning. 3/14/24  Yes Lopez, Irielle L., APRN, FNP   magnesium oxide (MAG-OX) 400 mg (241.3 mg magnesium) tablet Take 1 tablet (400 mg total) by mouth 2 (two) times daily. 5/31/24 5/31/25 Yes Paris Lujan, MARK   meclizine (ANTIVERT) 25 mg tablet Take 1 tablet (25 mg total) by mouth 3 (three) times daily as needed for Dizziness.  "3/14/24  Yes Karan Lopez APRN, FNP   mycophenolate (CELLCEPT) 250 mg Cap Take 4 capsules (1,000 mg total) by mouth 2 (two) times daily. HOLD cellcept until Monday (4/8) 4/3/24  Yes Aditi Walton PA-C   predniSONE (DELTASONE) 5 MG tablet Take 1 tablet (5 mg total) by mouth once daily. 11/20/23  Yes Emilia Abreu NP   tacrolimus (PROGRAF) 0.5 MG Cap Take 1 capsule (0.5 mg total) by mouth every 12 (twelve) hours. 6/7/24  Yes Bola Sosa MD   valsartan (DIOVAN) 320 MG tablet Take 1 tablet (320 mg total) by mouth once daily. 5/4/24 7/3/24 Yes Pedro Cummins, DO   blood sugar diagnostic Strp Use to check blood glucose 1 times daily, to use with insurance preferred meter 5/21/24   Mima Mack MD   blood-glucose meter Misc Use to check blood glucose 1 times daily, to use with insurance preferred meter 5/21/24   Mima Mack MD   blood-glucose sensor Kayla Gin 3, use as directed, change every 14 days , e 11.65 3/15/23   Karan Lopez APRN, FNP   lancets 30 gauge Misc Use to check blood glucose 1 times daily, to use with insurance preferred meter 5/21/24   Mima Mack MD   pen needle, diabetic (BD ULTRA-FINE LO PEN NEEDLE) 32 gauge x 5/32" Ndle USE TO ADMINISTER INSULIN 4 TIMES DAILY. 7/19/22   Karan Lopez APRN, FNP   pen needle, diabetic (BD ULTRA-FINE LO PEN NEEDLE) 32 gauge x 5/32" Ndle use four times a day 9/5/23 9/4/24  Karan Lopez APRN, FNP   insulin lispro (HUMALOG KWIKPEN INSULIN) 100 unit/mL pen Inject 18 units w/ breakfast, 16 units w/ lunch and dinner plus scale 150-200 +2, 201-250 +4, 251-300 +6, 301-350 +8. 1/25/22 1/2/23  Karan Lopez, APRN, FNP       Medications - Current  Scheduled Meds:  Continuous Infusions:    Allergies  Review of patient's allergies indicates:   Allergen Reactions    Nifedipine Other (See Comments)     Gingival hyperplasia     Past Medical History  Past Medical History:   Diagnosis Date    Anticoagulant " long-term use     Anxiety 07/29/2014    Arthritis     atrial fibrillation     Bilateral diabetic retinopathy 2017    CKD (chronic kidney disease) stage 2, GFR 60-89 ml/min 12/28/2016    CKD (chronic kidney disease) stage 4, GFR 15-29 ml/min 07/29/2014    Colon polyps 2014    Depression - situational 07/29/2014    Diabetes mellitus     Diabetes type 2 since 2000 07/29/2014    Diabetic neuropathy 07/29/2014    Encounter for blood transfusion     History of cardioversion 10/03/2019    History of hepatitis C, s/p successful Rx w/ SVR24 - 9/2017 07/29/2014    Genotype 1a, treatment naive 10/2014 liver biopsy - grade 1 / stage 1 Completed Harvoni w/ SVR    Hyperlipidemia 07/29/2014    Hypertension     Neuropathy     Organ transplant candidate 07/29/2014    Pancreatitis     S/P cadaveric kidney transplant 11/5/2016. ESRD secondary to HTN/DMII 11/05/2016    Stage 3a chronic kidney disease 12/28/2016    Type 2 diabetes mellitus with stage 3a chronic kidney disease, with long-term current use of insulin 07/29/2014     Past Surgical History  Past Surgical History:   Procedure Laterality Date    APPENDECTOMY      CATARACT EXTRACTION W/  INTRAOCULAR LENS IMPLANT Right 3/13/2024    Procedure: EXTRACTION, CATARACT, WITH IOL INSERTION;  Surgeon: Jennifer Palacio MD;  Location: [facility] OR;  Service: Ophthalmology;  Laterality: Right;    CATARACT EXTRACTION W/  INTRAOCULAR LENS IMPLANT Left 4/10/2024    Procedure: EXTRACTION, CATARACT, WITH IOL INSERTION;  Surgeon: Jennifer Palacio MD;  Location: [facility] OR;  Service: Ophthalmology;  Laterality: Left;    COLONOSCOPY N/A 12/21/2020    Procedure: COLONOSCOPY;  Surgeon: Tico Bell MD;  Location: [address]);  Service: Endoscopy;  Laterality: N/A;  ok to hold eliquis per Dr. Valadez, see telephone encounter 11/13/2020-MS  covid test 12/18 [place]    KIDNEY TRANSPLANT      TRANSESOPHAGEAL ECHOCARDIOGRAPHY N/A 8/26/2019    Procedure: ECHOCARDIOGRAM, TRANSESOPHAGEAL;  Surgeon: Dolores  "Diagnostic Provider;  Location: [facility] EP LAB;  Service: Cardiology;  Laterality: N/A;    TREATMENT OF CARDIAC ARRHYTHMIA N/A 8/26/2019    Procedure: CARDIOVERSION;  Surgeon: Bronson Bowden MD;  Location: [facility] EP LAB;  Service: Cardiology;  Laterality: N/A;  AF, FELICIANO, DCCV, MAC, GP, 3 PREP     ROS  10 point ROS performed and negative except as stated in HPI     Physical Examination   Vital Signs  Vitals  Temp: 98.4 °F (36.9 °C)  Temp Source: Temporal  Pulse: 79  Heart Rate Source: Monitor  Resp: 16  SpO2: 99 %  BP: (!) 198/105  MAP (mmHg): 145  BP Location: Left arm  BP Method: Automatic  Patient Position: Lying    24 Hour VS Range  Temp:  [98.4 °F (36.9 °C)]   Pulse:  [79]   Resp:  [16]   BP: (198-218)/()   SpO2:  [99 %]   No intake or output data in the 24 hours ending 06/11/24 0703      Physical Exam  HENT:      Head: Normocephalic and atraumatic.   Eyes:      Conjunctiva/sclera: Conjunctivae normal.   Cardiovascular:      Rate and Rhythm: Normal rate. Rhythm irregular.      Heart sounds: Normal heart sounds.   Pulmonary:      Effort: Pulmonary effort is normal. No respiratory distress.      Breath sounds: Normal breath sounds. No wheezing.   Abdominal:      General: There is no distension.      Palpations: Abdomen is soft.      Tenderness: There is no abdominal tenderness.   Musculoskeletal:      Cervical back: Normal range of motion.   Neurological:      Mental Status: He is alert and oriented to person, place, and time.   Psychiatric:         Mood and Affect: Affect normal.         Data   No results for input(s): "WBC", "HGB", "HCT", "PLT" in the last 168 hours.   Recent Labs   Lab 06/04/24  0901   INR 1.2      Recent Labs   Lab 06/04/24  0901      K 4.7      CO2 29   BUN 26*   CREATININE 1.8*   ANIONGAP 8   CALCIUM 9.4      Assessment & Plan     FELICIANO for SHERRILL assessment prior to ablation  -No absolute contraindications of esophageal stricture, tumor, perforation, laceration,or diverticulum " and/or active GI bleed  -The risks, benefits & alternatives of the procedure were explained to the patient.   -The risks of transesophageal echo include but are not limited to:  Dental trauma, esophageal trauma/perforation, bleeding, laryngospasm/brochospasm, aspiration, sore throat/hoarseness, & dislodgement of the endotracheal tube/nasogastric tube (where applicable).    -The risks of ANES monitored sedation include hypotension, respiratory depression, arrhythmias, bronchospasm, & death.    -Informed consent was obtained. The patient is agreeable to proceed with the procedure and all questions and concerns addressed.    Case discussed with an attending in echocardiography lab.    Lance Potteror, MD  [facility]   Cardiovascular Disease PGY-V

## 2024-06-11 NOTE — NURSING TRANSFER
Nursing Transfer Note      6/11/2024   1:17pm    Nurse giving handoff:JENNIFER Freitas PACU RN   Nurse receiving handoff:SANTA Chan RN     Reason patient is being transferred: post procedure     Transfer To: SSU room 2    Transfer via stretcher    Transfer with cardiac monitoring    Transported by RN     Additional Lines: condom catheter     Any special needs or follow-up needed: bedrest until 1445    Chart send with patient: Yes    Notified: spouse    Patient reassessed at: 1253 on 6/11

## 2024-06-11 NOTE — ANESTHESIA PROCEDURE NOTES
Arterial    Diagnosis: a fib    Patient location during procedure: done in OR    Staffing  Authorizing Provider: Franky Tam MD  Performing Provider: Franky Tam MD    Staffing  Performed by: Franky Tam MD  Authorized by: Franky Tam MD    Anesthesiologist was present at the time of the procedure.  Arterial  Skin Prep: chlorhexidine gluconate  Local Infiltration: none  Orientation: right  Location: radial    Catheter Size: 20 G  Catheter placement by Anatomical landmarks. Heme positive aspiration all ports. Insertion Attempts: 1  Assessment  Dressing: secured with tape and tegaderm

## 2024-06-11 NOTE — ANESTHESIA PROCEDURE NOTES
Intubation    Date/Time: 6/11/2024 7:28 AM    Performed by: Wero Atkins III, CRNA  Authorized by: Wero Atkins III, CRNA    Intubation:     Induction:  Inhalational - mask    Intubated:  Postinduction    Mask Ventilation:  Easy mask    Attempts:  1    Attempted By:  CRNA    Method of Intubation:  Video laryngoscopy    Blade:  Faye 4    Laryngeal View Grade: Grade IIA - cords partially seen      Difficult Airway Encountered?: No      Complications:  None    Airway Device:  Oral endotracheal tube    Airway Device Size:  7.5    Style/Cuff Inflation:  Cuffed (inflated to minimal occlusive pressure)    Tube secured:  23    Secured at:  The lips    Placement Verified By:  Capnometry    Complicating Factors:  None    Findings Post-Intubation:  BS equal bilateral

## 2024-06-11 NOTE — ANESTHESIA POSTPROCEDURE EVALUATION
Anesthesia Post Evaluation    Patient: Ravi Zamorano    Procedure(s) Performed: Procedure(s) (LRB):  Ablation atrial fibrillation (N/A)  ECHOCARDIOGRAM, TRANSESOPHAGEAL (N/A)  Cardioversion  Ablation, Atrial Flutter, Typical    Final Anesthesia Type: general      Level of consciousness: awake and alert  Post-procedure vital signs: reviewed and stable  Pain control: Pain has been treated.  Airway patency: patent    PONV status: Absent or treated.  Anesthetic complications: no      Cardiovascular status: hemodynamically stable  Respiratory status: unassisted  Hydration status: euvolemic                Vitals Value Taken Time   /86 06/11/24 1132   Temp 36.2 °C (97.2 °F) 06/11/24 1053   Pulse 73 06/11/24 1139   Resp 18 06/11/24 1115   SpO2 98 % 06/11/24 1139   Vitals shown include unfiled device data.      No case tracking events are documented in the log.      Pain/Gumreet Score: Gurmeet Score: 8 (6/11/2024 11:15 AM)

## 2024-06-11 NOTE — ANESTHESIA RELEASE NOTE
"Anesthesia Release from PACU Note    Patient: Ravi Zamorano    Procedure(s) Performed: Procedure(s) (LRB):  Ablation atrial fibrillation (N/A)  ECHOCARDIOGRAM, TRANSESOPHAGEAL (N/A)  Cardioversion  Ablation, Atrial Flutter, Typical    Anesthesia type: general    Post pain: Adequate analgesia    Post assessment: no apparent anesthetic complications    Last Vitals: Visit Vitals  BP (!) 187/93 (BP Location: Left arm, Patient Position: Lying)   Pulse 73   Temp 36.6 °C (97.9 °F) (Temporal)   Resp 18   Ht 6' 1" (1.854 m)   Wt 81.6 kg (180 lb)   SpO2 96%   BMI 23.75 kg/m²       Post vital signs: stable    Level of consciousness: awake, alert , and oriented    Nausea/Vomiting: no nausea/no vomiting    Complications: none    Airway Patency: patent    Respiratory: unassisted    Cardiovascular: stable and blood pressure at baseline    Hydration: euvolemic  "

## 2024-06-11 NOTE — NURSING
Ambulated and voided.  Condom cath d/c'd by patient and his wife.  Right and left groins clean dry and intact.  Right and left groins soft.  No bleeding or hematoma noted.  VSS.  Pt and pt wife both verbalized an understanding of d/c instructions.  IV d/c'd from left hand with cath tip intact.

## 2024-06-11 NOTE — SUBJECTIVE & OBJECTIVE
Past Medical History:   Diagnosis Date    Anticoagulant long-term use     Anxiety 07/29/2014    Arthritis     atrial fibrillation     Bilateral diabetic retinopathy 2017    CKD (chronic kidney disease) stage 2, GFR 60-89 ml/min 12/28/2016    CKD (chronic kidney disease) stage 4, GFR 15-29 ml/min 07/29/2014    Colon polyps 2014    Depression - situational 07/29/2014    Diabetes mellitus     Diabetes type 2 since 2000 07/29/2014    Diabetic neuropathy 07/29/2014    Encounter for blood transfusion     History of cardioversion 10/03/2019    History of hepatitis C, s/p successful Rx w/ SVR24 - 9/2017 07/29/2014    Genotype 1a, treatment naive 10/2014 liver biopsy - grade 1 / stage 1 Completed Harvoni w/ SVR    Hyperlipidemia 07/29/2014    Hypertension     Neuropathy     Organ transplant candidate 07/29/2014    Pancreatitis     S/P cadaveric kidney transplant 11/5/2016. ESRD secondary to HTN/DMII 11/05/2016    Stage 3a chronic kidney disease 12/28/2016    Type 2 diabetes mellitus with stage 3a chronic kidney disease, with long-term current use of insulin 07/29/2014       Past Surgical History:   Procedure Laterality Date    APPENDECTOMY      CATARACT EXTRACTION W/  INTRAOCULAR LENS IMPLANT Right 3/13/2024    Procedure: EXTRACTION, CATARACT, WITH IOL INSERTION;  Surgeon: Jennifer Palacio MD;  Location: [facility] OR;  Service: Ophthalmology;  Laterality: Right;    CATARACT EXTRACTION W/  INTRAOCULAR LENS IMPLANT Left 4/10/2024    Procedure: EXTRACTION, CATARACT, WITH IOL INSERTION;  Surgeon: Jennifer Palacio MD;  Location: [facility] OR;  Service: Ophthalmology;  Laterality: Left;    COLONOSCOPY N/A 12/21/2020    Procedure: COLONOSCOPY;  Surgeon: Tico Bell MD;  Location: [facility] ENDO [address]);  Service: Endoscopy;  Laterality: N/A;  ok to hold eliquis per Dr. Valadez, see telephone encounter 11/13/2020-MS  covid test 12/18 [place]    KIDNEY TRANSPLANT      TRANSESOPHAGEAL ECHOCARDIOGRAPHY N/A 8/26/2019    Procedure:  ECHOCARDIOGRAM, TRANSESOPHAGEAL;  Surgeon: Dolores Diagnostic Provider;  Location: [facility] EP LAB;  Service: Cardiology;  Laterality: N/A;    TREATMENT OF CARDIAC ARRHYTHMIA N/A 8/26/2019    Procedure: CARDIOVERSION;  Surgeon: Bronson Bowden MD;  Location: [facility] EP LAB;  Service: Cardiology;  Laterality: N/A;  AF, FELICIANO, DCCV, MAC, GP, 3 PREP       Review of patient's allergies indicates:   Allergen Reactions    Nifedipine Other (See Comments)     Gingival hyperplasia       Current Facility-Administered Medications on File Prior to Encounter   Medication    balanced salt irrigation intra-ocular solution 1 drop    phenylephrine HCL 10% ophthalmic solution 1 drop    proparacaine 0.5 % ophthalmic solution 1 drop    sodium chloride 0.9% flush 10 mL    TETRAcaine HCl (PF) 0.5 % Drop 1 drop     Current Outpatient Medications on File Prior to Encounter   Medication Sig    apixaban (ELIQUIS) 5 mg Tab Take 1 tablet (5 mg total) by mouth 2 (two) times daily.    aspirin 81 MG Chew Take 81 mg by mouth once daily.    atorvastatin (LIPITOR) 40 MG tablet Take 1 tablet (40 mg total) by mouth once daily.    empagliflozin (JARDIANCE) 10 mg tablet Take 1 tablet (10 mg total) by mouth once daily.    famotidine (PEPCID) 20 MG tablet Take 1 tablet (20 mg total) by mouth every evening.    gabapentin (NEURONTIN) 300 MG capsule Take 1 capsule by mouth in the morning, 1 capsule midday, and 3 capsules at night.    insulin aspart U-100 (NOVOLOG) 100 unit/mL (3 mL) InPn pen Inject 16 units w/ breakfast, 10 units at lunch and dinner scale 180-230+2, 231-280+4, 281-330+6, 331-380+8, >380+10.  *Max daily 66 units.*    insulin glargine U-100, Lantus, (LANTUS SOLOSTAR U-100 INSULIN) 100 unit/mL (3 mL) InPn pen Inject 20 Units into the skin every morning.    meclizine (ANTIVERT) 25 mg tablet Take 1 tablet (25 mg total) by mouth 3 (three) times daily as needed for Dizziness.    mycophenolate (CELLCEPT) 250 mg Cap Take 4 capsules (1,000 mg total) by mouth 2  "(two) times daily. HOLD cellcept until Monday ()    predniSONE (DELTASONE) 5 MG tablet Take 1 tablet (5 mg total) by mouth once daily.    valsartan (DIOVAN) 320 MG tablet Take 1 tablet (320 mg total) by mouth once daily.    blood-glucose sensor Kayla Gin 3, use as directed, change every 14 days , e 11.65    pen needle, diabetic (BD ULTRA-FINE LO PEN NEEDLE) 32 gauge x 5/32" Ndle USE TO ADMINISTER INSULIN 4 TIMES DAILY.    pen needle, diabetic (BD ULTRA-FINE LO PEN NEEDLE) 32 gauge x 5/32" Ndle use four times a day    [DISCONTINUED] insulin lispro (HUMALOG KWIKPEN INSULIN) 100 unit/mL pen Inject 18 units w/ breakfast, 16 units w/ lunch and dinner plus scale 150-200 +2, 201-250 +4, 251-300 +6, 301-350 +8.     Family History       Problem Relation (Age of Onset)    Cancer Brother    Diabetes Mother    Heart disease Mother, Father    Hypertension Mother, Brother          Tobacco Use    Smoking status: Former     Current packs/day: 0.00     Average packs/day: 0.5 packs/day for 32.0 years (16.0 ttl pk-yrs)     Types: Cigarettes     Start date: 1984     Quit date: 2016     Years since quittin.5    Smokeless tobacco: Never    Tobacco comments:     Pt reports that he quit in , but started up again in . pt reports he is currently working on quitting again   Substance and Sexual Activity    Alcohol use: Yes     Comment: Pt reports occasional beers on Sundays. Pt reports drinking daily prior to ESRD diagnosis.    Drug use: No    Sexual activity: Yes     Partners: Female     Review of Systems   All other systems reviewed and are negative.    Objective:     Vital Signs (Most Recent):  Temp: 98.4 °F (36.9 °C) (24)  Pulse: 79 (24)  Resp: 16 (24)  BP: (!) 198/105 (24)  SpO2: 99 % (24) Vital Signs (24h Range):  Temp:  [98.4 °F (36.9 °C)] 98.4 °F (36.9 °C)  Pulse:  [79] 79  Resp:  [16] 16  SpO2:  [99 %] 99 %  BP: (198-218)/() 198/105 "       Weight: 81.6 kg (180 lb)  Body mass index is 23.75 kg/m².    SpO2: 99 %        Physical Exam  General: NAD. AAO  HENT: EOMI  Neck: supple. No JVD  CV: irregularly irregular. Normal S1/S2. 2+ radial pulses  Resp: CTAB. No increased work of breathing  Ext: warm. No edema.         Significant Labs: All pertinent lab results from the last 24 hours have been reviewed.    Significant Imaging:  Reviewed

## 2024-06-11 NOTE — DISCHARGE SUMMARY
Electrophysiology  Discharge Summary      Admit Date: 6/11/2024  Discharge Date:  6/11/2024  Attending Physician: Bronson Bowden MD  Discharge Physician: Lisandro Jauregui MD    Principal Diagnoses: Persistent atrial fibrillation  Indication for Admission: Ablation atrial fibrillation (N/A), ECHOCARDIOGRAM, TRANSESOPHAGEAL (N/A), Cardioversion, Ablation, Atrial Flutter, Typical    Discharged Condition: Good    Hospital Course:   Patient presented for  RF-CTI/PVI with posterior wall isolation for treatment of atrial fibrillation & CTI-dependent atrial flutter. No evidence of intra- or post-procedure complications. Post-ablation ECG shows NSR, and no acute abnormalities.     EP medications at discharge:  Antiarrhythmics and/or AVN agents:  Continue carvedilol 50mg BID, once start multaq then decrease carvedilol to 25mg BID  Start multaq 400mg BID (will require prior auth from insurance)  Previously discontinued flecainide due to IVCD   Initiation of Protonix 40 mg QD x 30 days & Lasix 20 mg PRN swelling/SOB.   Reaching out to Kidney transplant team with interactions of protonix with cellcept and multaq with tacrolimus  Patient was instructed to continue their oral anticoagulation as previously prescribed  Patient was instructed to take ibuprofen 800 mg TID x 3 days for pericarditis.     Groin access sites without hematoma or bleeding. Activity restrictions given to patient. Instructed to seek medical attention for shortness of breath, chest discomfort not alleviated with NSAIDs, bleeding/hematoma formation at the access sites, acute onset of neurologic symptoms, N/V, or hematemesis. At discharge the patient denied CP, SOB, access site bleeding/hematoma, or any other complaints or evidence of complications.    Medications:  Make sure to continue taking your blood thinner apixiban (trade name: Eliquis) after your procedure as you would normally take  Take pantoprazole (trade name: Protonix) nightly for at least 30 days  "after your procedure. This is to protect your esophagus during the post-operative period.  If given a prescription of furosemide (trade name: Lasix), which is a diuretic (fluid pill), you can take it daily or twice daily as needed for fluid retention or shortness of breath following your ablation  You may experience chest discomfort (also known as "pericarditis") with deep breathes, coughing, and/or laying down which is typically normal following your procedure. If this occurs, you can take ibuprofen (Motrin) 800 mg every 8 hours for 2-3 days. If the chest pain is persistent or severe please visit the nearest emergency department.    Groin site management, precautions, and restrictions:  Remove the bandages over your groin area the morning after your procedure. You can shower after you remove these bandages. Keep the groin sites clean and dry. You do not need to apply ointments or bandages to the area.   If oozing from groin site occurs, apply pressure without letting up for 15 minutes and lay flat for 1 hour. If bleeding has resolved, you can continue to monitor. If the bleeding continues or there is significant swelling or pain in the groin area, please visit the nearest ER for evaluation and treatment. DO NOT STOP TAKING YOUR BLOOD THINNER UNLESS INSTRUCTED BY A PHYSICIAN.   Do not take baths or submerge your groin area or at least 1 week or when the puncture sites in your groin have completely healed  Do not lift anything over 10 lbs for the first week after your procedure, and avoid strenuous activity during this time to allow for the groin sites to heal. After 1 week, there are no activity restrictions.  Please contact the electrophysiology clinic or go to the ER if you experience: severe chest pain, shortness of breath, bleeding or swelling of the groin sites, or any other concerns.     Diet: Cardiac diet    Activity: Ad iris, wound care instructions provided    Disposition: Home or Self Care    Follow " "Up:  Dr. Bowden in 3 months.    Discharge Medications:      Medication List        START taking these medications      dronedarone 400 mg Tab  Commonly known as: MULTAQ  Take 1 tablet (400 mg total) by mouth 2 (two) times daily with meals.     pantoprazole 40 MG tablet  Commonly known as: PROTONIX  Take 1 tablet (40 mg total) by mouth once daily.            CHANGE how you take these medications      carvediloL 25 MG tablet  Commonly known as: COREG  Take 1 tablet (25 mg total) by mouth 2 (two) times daily.  What changed: how much to take            CONTINUE taking these medications      apixaban 5 mg Tab  Commonly known as: ELIQUIS  Take 1 tablet (5 mg total) by mouth 2 (two) times daily.     aspirin 81 MG Chew     atorvastatin 40 MG tablet  Commonly known as: LIPITOR  Take 1 tablet (40 mg total) by mouth once daily.     * BD ULTRA-FINE LO PEN NEEDLE 32 gauge x 5/32" Ndle  Generic drug: pen needle, diabetic  USE TO ADMINISTER INSULIN 4 TIMES DAILY.     * BD ULTRA-FINE LO PEN NEEDLE 32 gauge x 5/32" Ndle  Generic drug: pen needle, diabetic  use four times a day     blood-glucose sensor Kayla  Gin 3, use as directed, change every 14 days , e 11.65     famotidine 20 MG tablet  Commonly known as: PEPCID  Take 1 tablet (20 mg total) by mouth every evening.     furosemide 20 MG tablet  Commonly known as: LASIX  Take 1 tablet (20 mg total) by mouth 2 (two) times a day.     gabapentin 300 MG capsule  Commonly known as: NEURONTIN  Take 1 capsule by mouth in the morning, 1 capsule midday, and 3 capsules at night.     insulin aspart U-100 100 unit/mL (3 mL) Inpn pen  Commonly known as: NovoLOG  Inject 16 units w/ breakfast, 10 units at lunch and dinner scale 180-230+2, 231-280+4, 281-330+6, 331-380+8, >380+10.  *Max daily 66 units.*     JARDIANCE 10 mg tablet  Generic drug: empagliflozin  Take 1 tablet (10 mg total) by mouth once daily.     LANTUS SOLOSTAR U-100 INSULIN 100 unit/mL (3 mL) Inpn pen  Generic drug: insulin " glargine U-100 (Lantus)  Inject 20 Units into the skin every morning.     magnesium oxide 400 mg (241.3 mg magnesium) tablet  Commonly known as: MAG-OX  Take 1 tablet (400 mg total) by mouth 2 (two) times daily.     meclizine 25 mg tablet  Commonly known as: ANTIVERT  Take 1 tablet (25 mg total) by mouth 3 (three) times daily as needed for Dizziness.     mycophenolate 250 mg Cap  Commonly known as: CELLCEPT  Take 4 capsules (1,000 mg total) by mouth 2 (two) times daily. HOLD cellcept until Monday (4/8)     predniSONE 5 MG tablet  Commonly known as: DELTASONE  Take 1 tablet (5 mg total) by mouth once daily.     tacrolimus 0.5 MG Cap  Commonly known as: PROGRAF  Take 1 capsule (0.5 mg total) by mouth every 12 (twelve) hours.     TRUE METRIX GLUCOSE METER Misc  Generic drug: blood-glucose meter  Use to check blood glucose 1 times daily, to use with insurance preferred meter     TRUE METRIX GLUCOSE TEST STRIP Strp  Generic drug: blood sugar diagnostic  Use to check blood glucose 1 times daily, to use with insurance preferred meter     TRUEPLUS LANCETS 30 gauge Misc  Generic drug: lancets  Use to check blood glucose 1 times daily, to use with insurance preferred meter     valsartan 320 MG tablet  Commonly known as: DIOVAN  Take 1 tablet (320 mg total) by mouth once daily.           * This list has 2 medication(s) that are the same as other medications prescribed for you. Read the directions carefully, and ask your doctor or other care provider to review them with you.                   Where to Get Your Medications        These medications were sent to [facility]  [address] [address]      Hours: Always Open Phone: [phone]   apixaban 5 mg Tab  carvediloL 25 MG tablet  dronedarone 400 mg Tab  pantoprazole 40 MG tablet         Lisandro Jauregui MD  Cardiovascular Disease PGY-V  [facility]

## 2024-06-11 NOTE — ASSESSMENT & PLAN NOTE
- RF-PVI  - FELICIANO, cancel if NSR  - PPI x 30 days    The risks, benefits and alternatives of the procedure were explained to the patient, patient's family and/or surrogate decision maker. Risks include (but not limited to) bleeding, hematoma, infection, pain, vascular damage, damage to structures surrounding the vasculature, myocardial damage [perforation, valvular damage], cardiac tamponade, phrenic nerve damage, pulmonary vein stenosis, atrioesophageal (AE) fistula, CVA, MI, and death. Patient is understanding that repeat ablations may be required. All questions were answered. Patient is understanding of these risks, and would like to proceed. Consents signed.

## 2024-06-11 NOTE — NURSING
Sutures removed from bilateral groins without difficulty.  No bleeding or hematoma noted.  Bilateral groins punctures covered with gauze/tegaderm.  IV from right hand d/c'd with cath tip intact.  Condom cath d/c'd by patient and wife.  Pt given urinal bottle.  Will continue to monitor.

## 2024-06-11 NOTE — H&P
[facility] - Short Stay Cardiac Unit  Cardiac Electrophysiology  History and Physical     Admission Date: 6/11/2024  Code Status: Prior   Attending Provider: Bronson Bowden MD   Principal Problem:<principal problem not specified>    Subjective:     Chief Complaint:  AF     HPI:  68 year old male with a history of DM on insulin, CKD2, history of kidney transplant in 2016 (Cr 1.5 in 2/2021), and diastolic dysfunction, who was diagnosed with rate controlled AF in 8/2019, manifesting as worsening DOSS. An echo done in 8/2019 showed an EF of 55% and severe LAE (MINA 54.3 mL/m2). He underwent DCCV in 8/2010, and was started on flecainide 100 mg bid post-procedure.     An echo in 6/2021 showed an EF of 55%. A 2 week [employer] DX monitor done in 7/2019 showed no arrhythmias.     At his 4/2023 visit, he was maintaining sinus rhythm but reporting more LH and DOSS. QRS wide on ECG--flecainide reduced to 50 mg bid. By 5/2023 QRS had narrowed, although he had an episode of syncope in 5/2023 thought to be due to hypotension 2/2 chlorthalidone.      SPECT 5/15/2023 negative for ischemia or scar. Event monitor 1/2024--one episode of AF noted 12/24/2023, and episodes of SR and junctional rhythm with HR ranging from 54-74.     At 2/2024 pt was in sinus rhythm. Reported chronic DOSS. Episodes of LH when hypotensive. Creatinine of 1.7 in 11/2023.  ms.     Pt seen by cardiology in 4/2024--back in AF. Presented to ED in 5/2024 with rate controlled AF and worsening HF sx..      Admitted in 4/2024 for anemia (Hg 5.7). GI thought it was low likelihood this was 2/2 GIB. Awaiting outpatient hematology input. Back on eliquis. Hg stable over the past month or so.     ADHF admission on 5/2024.      My interpretation of today's ECG is    Past Medical History:   Diagnosis Date    Anticoagulant long-term use     Anxiety 07/29/2014    Arthritis     atrial fibrillation     Bilateral diabetic retinopathy 2017    CKD (chronic kidney disease) stage 2,  GFR 60-89 ml/min 12/28/2016    CKD (chronic kidney disease) stage 4, GFR 15-29 ml/min 07/29/2014    Colon polyps 2014    Depression - situational 07/29/2014    Diabetes mellitus     Diabetes type 2 since 2000 07/29/2014    Diabetic neuropathy 07/29/2014    Encounter for blood transfusion     History of cardioversion 10/03/2019    History of hepatitis C, s/p successful Rx w/ SVR24 - 9/2017 07/29/2014    Genotype 1a, treatment naive 10/2014 liver biopsy - grade 1 / stage 1 Completed Harvoni w/ SVR    Hyperlipidemia 07/29/2014    Hypertension     Neuropathy     Organ transplant candidate 07/29/2014    Pancreatitis     S/P cadaveric kidney transplant 11/5/2016. ESRD secondary to HTN/DMII 11/05/2016    Stage 3a chronic kidney disease 12/28/2016    Type 2 diabetes mellitus with stage 3a chronic kidney disease, with long-term current use of insulin 07/29/2014       Past Surgical History:   Procedure Laterality Date    APPENDECTOMY      CATARACT EXTRACTION W/  INTRAOCULAR LENS IMPLANT Right 3/13/2024    Procedure: EXTRACTION, CATARACT, WITH IOL INSERTION;  Surgeon: Jennifer Palacio MD;  Location: [facility] OR;  Service: Ophthalmology;  Laterality: Right;    CATARACT EXTRACTION W/  INTRAOCULAR LENS IMPLANT Left 4/10/2024    Procedure: EXTRACTION, CATARACT, WITH IOL INSERTION;  Surgeon: Jennifer Palacio MD;  Location: [facility] OR;  Service: Ophthalmology;  Laterality: Left;    COLONOSCOPY N/A 12/21/2020    Procedure: COLONOSCOPY;  Surgeon: Tico Bell MD;  Location: [facility] ENDO ([address] [place] [address]);  Service: Endoscopy;  Laterality: N/A;  ok to hold eliquis per Dr. Valadez, see telephone encounter 11/13/2020-MS  covid test 12/18 [place]    KIDNEY TRANSPLANT      TRANSESOPHAGEAL ECHOCARDIOGRAPHY N/A 8/26/2019    Procedure: ECHOCARDIOGRAM, TRANSESOPHAGEAL;  Surgeon: Dolores Diagnostic Provider;  Location: [facility] EP LAB;  Service: Cardiology;  Laterality: N/A;    TREATMENT OF CARDIAC ARRHYTHMIA N/A 8/26/2019    Procedure: CARDIOVERSION;  Surgeon:  Bronson Bowden MD;  Location: [facility] EP LAB;  Service: Cardiology;  Laterality: N/A;  AF, FELICIANO, DCCV, MAC, GP, 3 PREP       Review of patient's allergies indicates:   Allergen Reactions    Nifedipine Other (See Comments)     Gingival hyperplasia       Current Facility-Administered Medications on File Prior to Encounter   Medication    balanced salt irrigation intra-ocular solution 1 drop    phenylephrine HCL 10% ophthalmic solution 1 drop    proparacaine 0.5 % ophthalmic solution 1 drop    sodium chloride 0.9% flush 10 mL    TETRAcaine HCl (PF) 0.5 % Drop 1 drop     Current Outpatient Medications on File Prior to Encounter   Medication Sig    apixaban (ELIQUIS) 5 mg Tab Take 1 tablet (5 mg total) by mouth 2 (two) times daily.    aspirin 81 MG Chew Take 81 mg by mouth once daily.    atorvastatin (LIPITOR) 40 MG tablet Take 1 tablet (40 mg total) by mouth once daily.    empagliflozin (JARDIANCE) 10 mg tablet Take 1 tablet (10 mg total) by mouth once daily.    famotidine (PEPCID) 20 MG tablet Take 1 tablet (20 mg total) by mouth every evening.    gabapentin (NEURONTIN) 300 MG capsule Take 1 capsule by mouth in the morning, 1 capsule midday, and 3 capsules at night.    insulin aspart U-100 (NOVOLOG) 100 unit/mL (3 mL) InPn pen Inject 16 units w/ breakfast, 10 units at lunch and dinner scale 180-230+2, 231-280+4, 281-330+6, 331-380+8, >380+10.  *Max daily 66 units.*    insulin glargine U-100, Lantus, (LANTUS SOLOSTAR U-100 INSULIN) 100 unit/mL (3 mL) InPn pen Inject 20 Units into the skin every morning.    meclizine (ANTIVERT) 25 mg tablet Take 1 tablet (25 mg total) by mouth 3 (three) times daily as needed for Dizziness.    mycophenolate (CELLCEPT) 250 mg Cap Take 4 capsules (1,000 mg total) by mouth 2 (two) times daily. HOLD cellcept until Monday (4/8)    predniSONE (DELTASONE) 5 MG tablet Take 1 tablet (5 mg total) by mouth once daily.    valsartan (DIOVAN) 320 MG tablet Take 1 tablet (320 mg total) by mouth once daily.  "   blood-glucose sensor Kayla Gin 3, use as directed, change every 14 days , e 11.65    pen needle, diabetic (BD ULTRA-FINE LO PEN NEEDLE) 32 gauge x 5/32" Ndle USE TO ADMINISTER INSULIN 4 TIMES DAILY.    pen needle, diabetic (BD ULTRA-FINE LO PEN NEEDLE) 32 gauge x 5/32" Ndle use four times a day    [DISCONTINUED] insulin lispro (HUMALOG KWIKPEN INSULIN) 100 unit/mL pen Inject 18 units w/ breakfast, 16 units w/ lunch and dinner plus scale 150-200 +2, 201-250 +4, 251-300 +6, 301-350 +8.     Family History       Problem Relation (Age of Onset)    Cancer Brother    Diabetes Mother    Heart disease Mother, Father    Hypertension Mother, Brother          Tobacco Use    Smoking status: Former     Current packs/day: 0.00     Average packs/day: 0.5 packs/day for 32.0 years (16.0 ttl pk-yrs)     Types: Cigarettes     Start date: 1984     Quit date: 2016     Years since quittin.5    Smokeless tobacco: Never    Tobacco comments:     Pt reports that he quit in , but started up again in . pt reports he is currently working on quitting again   Substance and Sexual Activity    Alcohol use: Yes     Comment: Pt reports occasional beers on Sundays. Pt reports drinking daily prior to ESRD diagnosis.    Drug use: No    Sexual activity: Yes     Partners: Female     Review of Systems   All other systems reviewed and are negative.    Objective:     Vital Signs (Most Recent):  Temp: 98.4 °F (36.9 °C) (24 0551)  Pulse: 79 (24 0551)  Resp: 16 (24 05)  BP: (!) 198/105 (24 0552)  SpO2: 99 % (24 05) Vital Signs (24h Range):  Temp:  [98.4 °F (36.9 °C)] 98.4 °F (36.9 °C)  Pulse:  [79] 79  Resp:  [16] 16  SpO2:  [99 %] 99 %  BP: (198-218)/() 198/105       Weight: 81.6 kg (180 lb)  Body mass index is 23.75 kg/m².    SpO2: 99 %        Physical Exam  General: NAD. AAO  HENT: EOMI  Neck: supple. No JVD  CV: irregularly irregular. Normal S1/S2. 2+ radial pulses  Resp: CTAB. No " increased work of breathing  Ext: warm. No edema.         Significant Labs: All pertinent lab results from the last 24 hours have been reviewed.    Significant Imaging:  Reviewed  Assessment and Plan:     Persistent atrial fibrillation  - RF-PVI  - FELICIANO, cancel if NSR  - PPI x 30 days    The risks, benefits and alternatives of the procedure were explained to the patient, patient's family and/or surrogate decision maker. Risks include (but not limited to) bleeding, hematoma, infection, pain, vascular damage, damage to structures surrounding the vasculature, myocardial damage [perforation, valvular damage], cardiac tamponade, phrenic nerve damage, pulmonary vein stenosis, atrioesophageal (AE) fistula, CVA, MI, and death. Patient is understanding that repeat ablations may be required. All questions were answered. Patient is understanding of these risks, and would like to proceed. Consents signed.         Alexi Saldana MD  Cardiac Electrophysiology  [facility] - Short Stay Cardiac Unit

## 2024-06-11 NOTE — PROGRESS NOTES
MD, Alexi Saldana with EP notified about patient's frequent PACs and pauses. Rhythm appearing that patient is trying to convert back to Afib. MD states this is not a new finding. No new orders at this time. WCTM.

## 2024-06-12 DIAGNOSIS — E11.9 TYPE 2 DIABETES MELLITUS WITHOUT COMPLICATION: ICD-10-CM

## 2024-06-13 ENCOUNTER — TELEPHONE (OUTPATIENT)
Dept: TRANSPLANT | Facility: CLINIC | Age: 69
End: 2024-06-13
Payer: MEDICARE

## 2024-06-13 ENCOUNTER — LAB VISIT (OUTPATIENT)
Dept: LAB | Facility: HOSPITAL | Age: 69
End: 2024-06-13
Attending: INTERNAL MEDICINE
Payer: MEDICARE

## 2024-06-13 DIAGNOSIS — Z94.0 KIDNEY REPLACED BY TRANSPLANT: ICD-10-CM

## 2024-06-13 LAB — TACROLIMUS BLD-MCNC: 6.6 NG/ML (ref 5–15)

## 2024-06-13 PROCEDURE — 36415 COLL VENOUS BLD VENIPUNCTURE: CPT | Performed by: INTERNAL MEDICINE

## 2024-06-13 PROCEDURE — 80197 ASSAY OF TACROLIMUS: CPT | Performed by: INTERNAL MEDICINE

## 2024-06-13 NOTE — HOSPITAL COURSE
S/p CTI/PVI/PWI. Start Multaq 400 mg BID, and PPI x 30 days which can effect his transplant medications. Message sent to his transplant service her adjustments as appropriate.

## 2024-06-13 NOTE — DISCHARGE SUMMARY
[facility] - Short Stay Cardiac Unit  Cardiac Electrophysiology  Discharge Summary      Patient Name: Ravi Zamorano  MRN: [id number]  Admission Date: 6/11/2024  Hospital Length of Stay: 0 days  Discharge Date and Time: 6/11/2024  3:52 PM  Attending Physician: No att. providers found    Discharging Provider: Alexi Saldana MD  Primary Care Physician: Mima Mack MD    HPI:   68 year old male with a history of DM on insulin, CKD2, history of kidney transplant in 2016 (Cr 1.5 in 2/2021), and diastolic dysfunction, who was diagnosed with rate controlled AF in 8/2019, manifesting as worsening DOSS. An echo done in 8/2019 showed an EF of 55% and severe LAE (MINA 54.3 mL/m2). He underwent DCCV in 8/2010, and was started on flecainide 100 mg bid post-procedure.     An echo in 6/2021 showed an EF of 55%. A 2 week [employer] DX monitor done in 7/2019 showed no arrhythmias.     At his 4/2023 visit, he was maintaining sinus rhythm but reporting more LH and DOSS. QRS wide on ECG--flecainide reduced to 50 mg bid. By 5/2023 QRS had narrowed, although he had an episode of syncope in 5/2023 thought to be due to hypotension 2/2 chlorthalidone.      SPECT 5/15/2023 negative for ischemia or scar. Event monitor 1/2024--one episode of AF noted 12/24/2023, and episodes of SR and junctional rhythm with HR ranging from 54-74.     At 2/2024 pt was in sinus rhythm. Reported chronic DOSS. Episodes of LH when hypotensive. Creatinine of 1.7 in 11/2023.  ms.     Pt seen by cardiology in 4/2024--back in AF. Presented to ED in 5/2024 with rate controlled AF and worsening HF sx..      Admitted in 4/2024 for anemia (Hg 5.7). GI thought it was low likelihood this was 2/2 GIB. Awaiting outpatient hematology input. Back on eliquis. Hg stable over the past month or so.     ADHF admission on 5/2024.      My interpretation of today's ECG is    Procedure(s) (LRB):  Ablation atrial fibrillation (N/A)  ECHOCARDIOGRAM, TRANSESOPHAGEAL  (N/A)  Cardioversion  Ablation, Atrial Flutter, Typical     Indwelling Lines/Drains at time of discharge:  Lines/Drains/Airways       None                   Hospital Course:  S/p CTI/PVI/PWI. Start Multaq 400 mg BID, and PPI x 30 days which can effect his transplant medications. Message sent to his transplant service her adjustments as appropriate.     Goals of Care Treatment Preferences:  Code Status: Full Code      Consults:     Significant Diagnostic Studies: Labs: All labs within the past 24 hours have been reviewed    Pending Diagnostic Studies:       None            Final Active Diagnoses:    Diagnosis Date Noted POA    PRINCIPAL PROBLEM:  Persistent atrial fibrillation [I48.19] 08/09/2019 Yes      Problems Resolved During this Admission:     No new Assessment & Plan notes have been filed under this hospital service since the last note was generated.  Service: Arrhythmia      Discharged Condition: stable    Disposition: Home or Self Care    Follow Up:   Follow-up Information       Bronson Bowden MD Follow up in 3 month(s).    Specialties: Electrophysiology, Cardiovascular Disease, Cardiology  Contact information:  [address] [place] [id number]  [phone]                           Patient Instructions:      Lifting restrictions     Notify your health care provider if you experience any of the following:  difficulty breathing or increased cough     Notify your health care provider if you experience any of the following:  redness, tenderness, or signs of infection (pain, swelling, redness, odor or green/yellow discharge around incision site)     Notify your health care provider if you experience any of the following:  severe uncontrolled pain     Notify your health care provider if you experience any of the following:  persistent dizziness, light-headedness, or visual disturbances     No dressing needed     Weight bearing restrictions (specify):     Medications:  Reconciled Home Medications:     "  Medication List        START taking these medications      MULTAQ 400 mg Tab  Generic drug: dronedarone  Take 1 tablet (400 mg total) by mouth 2 (two) times daily with meals.     pantoprazole 40 MG tablet  Commonly known as: PROTONIX  Take 1 tablet (40 mg total) by mouth once daily.            CHANGE how you take these medications      carvediloL 25 MG tablet  Commonly known as: COREG  Take 1 tablet (25 mg total) by mouth 2 (two) times daily.  What changed: how much to take            CONTINUE taking these medications      apixaban 5 mg Tab  Commonly known as: ELIQUIS  Take 1 tablet (5 mg total) by mouth 2 (two) times daily.     aspirin 81 MG Chew  Take 81 mg by mouth once daily.     atorvastatin 40 MG tablet  Commonly known as: LIPITOR  Take 1 tablet (40 mg total) by mouth once daily.     * BD ULTRA-FINE LO PEN NEEDLE 32 gauge x 5/32" Ndle  Generic drug: pen needle, diabetic  USE TO ADMINISTER INSULIN 4 TIMES DAILY.     * BD ULTRA-FINE LO PEN NEEDLE 32 gauge x 5/32" Ndle  Generic drug: pen needle, diabetic  use four times a day     blood-glucose sensor Kayla  Gin 3, use as directed, change every 14 days , e 11.65     famotidine 20 MG tablet  Commonly known as: PEPCID  Take 1 tablet (20 mg total) by mouth every evening.     furosemide 20 MG tablet  Commonly known as: LASIX  Take 1 tablet (20 mg total) by mouth 2 (two) times a day.     gabapentin 300 MG capsule  Commonly known as: NEURONTIN  Take 1 capsule by mouth in the morning, 1 capsule midday, and 3 capsules at night.     insulin aspart U-100 100 unit/mL (3 mL) Inpn pen  Commonly known as: NovoLOG  Inject 16 units w/ breakfast, 10 units at lunch and dinner scale 180-230+2, 231-280+4, 281-330+6, 331-380+8, >380+10.  *Max daily 66 units.*     JARDIANCE 10 mg tablet  Generic drug: empagliflozin  Take 1 tablet (10 mg total) by mouth once daily.     LANTUS SOLOSTAR U-100 INSULIN 100 unit/mL (3 mL) Inpn pen  Generic drug: insulin glargine U-100 (Lantus)  Inject " 20 Units into the skin every morning.     magnesium oxide 400 mg (241.3 mg magnesium) tablet  Commonly known as: MAG-OX  Take 1 tablet (400 mg total) by mouth 2 (two) times daily.     meclizine 25 mg tablet  Commonly known as: ANTIVERT  Take 1 tablet (25 mg total) by mouth 3 (three) times daily as needed for Dizziness.     mycophenolate 250 mg Cap  Commonly known as: CELLCEPT  Take 4 capsules (1,000 mg total) by mouth 2 (two) times daily. HOLD cellcept until Monday (4/8)     predniSONE 5 MG tablet  Commonly known as: DELTASONE  Take 1 tablet (5 mg total) by mouth once daily.     tacrolimus 0.5 MG Cap  Commonly known as: PROGRAF  Take 1 capsule (0.5 mg total) by mouth every 12 (twelve) hours.     TRUE METRIX GLUCOSE METER Misc  Generic drug: blood-glucose meter  Use to check blood glucose 1 times daily, to use with insurance preferred meter     TRUE METRIX GLUCOSE TEST STRIP Strp  Generic drug: blood sugar diagnostic  Use to check blood glucose 1 times daily, to use with insurance preferred meter     TRUEPLUS LANCETS 30 gauge Misc  Generic drug: lancets  Use to check blood glucose 1 times daily, to use with insurance preferred meter     valsartan 320 MG tablet  Commonly known as: DIOVAN  Take 1 tablet (320 mg total) by mouth once daily.           * This list has 2 medication(s) that are the same as other medications prescribed for you. Read the directions carefully, and ask your doctor or other care provider to review them with you.                  Time spent on the discharge of patient: 15 minutes    Alexi Saldana MD  Cardiac Electrophysiology  [facility] - Short Stay Cardiac Unit

## 2024-06-13 NOTE — TELEPHONE ENCOUNTER
Returned call to patient.  Patient wanted to clarify if he is to continue with prograf.  Instructed to take prograf as is which he confirmed the correct dose.  Advised pt not to make any changes to prograf unless instructed by transplant.  Pt verbalized understanding.       ----- Message from Camille Connelly sent at 6/13/2024  2:42 PM CDT -----  Regarding: Questions      Name Of Caller:    Ravi      Contact Preference:  [phone]       Nature of call:   Pt requesting to speak with Jyoti. He has some questions about his Prograf.

## 2024-06-15 ENCOUNTER — HOSPITAL ENCOUNTER (OUTPATIENT)
Facility: HOSPITAL | Age: 69
Discharge: HOME | End: 2024-06-16
Attending: EMERGENCY MEDICINE | Admitting: STUDENT IN AN ORGANIZED HEALTH CARE EDUCATION/TRAINING PROGRAM
Payer: MEDICARE

## 2024-06-15 DIAGNOSIS — N18.32 STAGE 3B CHRONIC KIDNEY DISEASE: ICD-10-CM

## 2024-06-15 DIAGNOSIS — Z94.0 KIDNEY REPLACED BY TRANSPLANT: ICD-10-CM

## 2024-06-15 DIAGNOSIS — Z94.0 STATUS POST DECEASED-DONOR KIDNEY TRANSPLANTATION: Primary | ICD-10-CM

## 2024-06-15 DIAGNOSIS — R07.9 CHEST PAIN: ICD-10-CM

## 2024-06-15 DIAGNOSIS — R06.02 SHORTNESS OF BREATH: ICD-10-CM

## 2024-06-15 DIAGNOSIS — I50.20 HFREF (HEART FAILURE WITH REDUCED EJECTION FRACTION): ICD-10-CM

## 2024-06-15 PROBLEM — E11.9 TYPE 2 DIABETES MELLITUS: Status: ACTIVE | Noted: 2017-11-27

## 2024-06-15 LAB
ALBUMIN SERPL BCP-MCNC: 3.2 G/DL (ref 3.5–5.2)
ALP SERPL-CCNC: 40 U/L (ref 55–135)
ALT SERPL W/O P-5'-P-CCNC: <5 U/L (ref 10–44)
ANION GAP SERPL CALC-SCNC: 8 MMOL/L (ref 8–16)
AST SERPL-CCNC: 12 U/L (ref 10–40)
BASOPHILS NFR BLD: 1 % (ref 0–1.9)
BILIRUB SERPL-MCNC: 0.4 MG/DL (ref 0.1–1)
BNP SERPL-MCNC: 3203 PG/ML (ref 0–99)
BUN SERPL-MCNC: 29 MG/DL (ref 6–30)
BUN SERPL-MCNC: 31 MG/DL (ref 8–23)
CALCIUM SERPL-MCNC: 8.5 MG/DL (ref 8.7–10.5)
CHLORIDE SERPL-SCNC: 102 MMOL/L (ref 95–110)
CHLORIDE SERPL-SCNC: 99 MMOL/L (ref 95–110)
CO2 SERPL-SCNC: 23 MMOL/L (ref 23–29)
CREAT SERPL-MCNC: 1.9 MG/DL (ref 0.5–1.4)
CREAT SERPL-MCNC: 2.1 MG/DL (ref 0.5–1.4)
DIFFERENTIAL METHOD BLD: ABNORMAL
EOSINOPHIL NFR BLD: 1 % (ref 0–8)
ERYTHROCYTE [DISTWIDTH] IN BLOOD BY AUTOMATED COUNT: 15.3 % (ref 11.5–14.5)
EST. GFR  (NO RACE VARIABLE): 38.4 ML/MIN/1.73 M^2
GLUCOSE SERPL-MCNC: 91 MG/DL (ref 70–110)
GLUCOSE SERPL-MCNC: 93 MG/DL (ref 70–110)
HCT VFR BLD AUTO: 27.8 % (ref 40–54)
HCT VFR BLD CALC: 30 %PCV (ref 36–54)
HGB BLD-MCNC: 7.4 G/DL (ref 14–18)
IMM GRANULOCYTES # BLD AUTO: ABNORMAL K/UL (ref 0–0.04)
IMM GRANULOCYTES NFR BLD AUTO: ABNORMAL % (ref 0–0.5)
LYMPHOCYTES NFR BLD: 1 % (ref 18–48)
MCH RBC QN AUTO: 23.5 PG (ref 27–31)
MCHC RBC AUTO-ENTMCNC: 26.6 G/DL (ref 32–36)
MCV RBC AUTO: 88 FL (ref 82–98)
MONOCYTES NFR BLD: 8 % (ref 4–15)
NEUTROPHILS NFR BLD: 89 % (ref 38–73)
NRBC BLD-RTO: 0 /100 WBC
PLATELET # BLD AUTO: 114 K/UL (ref 150–450)
PLATELET BLD QL SMEAR: ABNORMAL
PMV BLD AUTO: ABNORMAL FL (ref 9.2–12.9)
POC IONIZED CALCIUM: 1.11 MMOL/L (ref 1.06–1.42)
POC TCO2 (MEASURED): 27 MMOL/L (ref 23–29)
POCT GLUCOSE: 108 MG/DL (ref 70–110)
POCT GLUCOSE: 215 MG/DL (ref 70–110)
POCT GLUCOSE: 77 MG/DL (ref 70–110)
POTASSIUM BLD-SCNC: 3.9 MMOL/L (ref 3.5–5.1)
POTASSIUM SERPL-SCNC: 4.1 MMOL/L (ref 3.5–5.1)
PROT SERPL-MCNC: 5.7 G/DL (ref 6–8.4)
RBC # BLD AUTO: 3.15 M/UL (ref 4.6–6.2)
SAMPLE: ABNORMAL
SODIUM BLD-SCNC: 133 MMOL/L (ref 136–145)
SODIUM SERPL-SCNC: 133 MMOL/L (ref 136–145)
TROPONIN I SERPL DL<=0.01 NG/ML-MCNC: 0.81 NG/ML (ref 0–0.03)
TROPONIN I SERPL DL<=0.01 NG/ML-MCNC: 0.93 NG/ML (ref 0–0.03)
TROPONIN I SERPL DL<=0.01 NG/ML-MCNC: 0.95 NG/ML (ref 0–0.03)
WBC # BLD AUTO: 8.5 K/UL (ref 3.9–12.7)

## 2024-06-15 PROCEDURE — 99285 EMERGENCY DEPT VISIT HI MDM: CPT | Mod: 25

## 2024-06-15 PROCEDURE — 83880 ASSAY OF NATRIURETIC PEPTIDE: CPT | Performed by: EMERGENCY MEDICINE

## 2024-06-15 PROCEDURE — 25000003 PHARM REV CODE 250: Performed by: EMERGENCY MEDICINE

## 2024-06-15 PROCEDURE — 85027 COMPLETE CBC AUTOMATED: CPT | Performed by: EMERGENCY MEDICINE

## 2024-06-15 PROCEDURE — 25000003 PHARM REV CODE 250

## 2024-06-15 PROCEDURE — 96372 THER/PROPH/DIAG INJ SC/IM: CPT

## 2024-06-15 PROCEDURE — 84484 ASSAY OF TROPONIN QUANT: CPT | Performed by: EMERGENCY MEDICINE

## 2024-06-15 PROCEDURE — 82962 GLUCOSE BLOOD TEST: CPT | Mod: 91

## 2024-06-15 PROCEDURE — G0378 HOSPITAL OBSERVATION PER HR: HCPCS

## 2024-06-15 PROCEDURE — 80053 COMPREHEN METABOLIC PANEL: CPT | Performed by: EMERGENCY MEDICINE

## 2024-06-15 PROCEDURE — 63600175 PHARM REV CODE 636 W HCPCS

## 2024-06-15 PROCEDURE — 96374 THER/PROPH/DIAG INJ IV PUSH: CPT

## 2024-06-15 PROCEDURE — 96376 TX/PRO/DX INJ SAME DRUG ADON: CPT

## 2024-06-15 PROCEDURE — 85007 BL SMEAR W/DIFF WBC COUNT: CPT | Mod: NCS | Performed by: EMERGENCY MEDICINE

## 2024-06-15 PROCEDURE — 93010 ELECTROCARDIOGRAM REPORT: CPT | Mod: ,,, | Performed by: INTERNAL MEDICINE

## 2024-06-15 PROCEDURE — 63600175 PHARM REV CODE 636 W HCPCS: Performed by: EMERGENCY MEDICINE

## 2024-06-15 PROCEDURE — 93005 ELECTROCARDIOGRAM TRACING: CPT

## 2024-06-15 PROCEDURE — 84484 ASSAY OF TROPONIN QUANT: CPT | Mod: 91 | Performed by: STUDENT IN AN ORGANIZED HEALTH CARE EDUCATION/TRAINING PROGRAM

## 2024-06-15 PROCEDURE — 80047 BASIC METABLC PNL IONIZED CA: CPT | Mod: 91

## 2024-06-15 RX ORDER — INSULIN ASPART 100 [IU]/ML
2 INJECTION, SOLUTION INTRAVENOUS; SUBCUTANEOUS
Status: DISCONTINUED | OUTPATIENT
Start: 2024-06-16 | End: 2024-06-16 | Stop reason: HOSPADM

## 2024-06-15 RX ORDER — IBUPROFEN 200 MG
16 TABLET ORAL
Status: DISCONTINUED | OUTPATIENT
Start: 2024-06-15 | End: 2024-06-16 | Stop reason: HOSPADM

## 2024-06-15 RX ORDER — IBUPROFEN 200 MG
24 TABLET ORAL
Status: DISCONTINUED | OUTPATIENT
Start: 2024-06-15 | End: 2024-06-16 | Stop reason: HOSPADM

## 2024-06-15 RX ORDER — TACROLIMUS 0.5 MG/1
0.5 CAPSULE ORAL 2 TIMES DAILY
Status: DISCONTINUED | OUTPATIENT
Start: 2024-06-15 | End: 2024-06-16

## 2024-06-15 RX ORDER — MYCOPHENOLATE MOFETIL 250 MG/1
1000 CAPSULE ORAL 2 TIMES DAILY
Status: DISCONTINUED | OUTPATIENT
Start: 2024-06-15 | End: 2024-06-16

## 2024-06-15 RX ORDER — INSULIN GLARGINE 100 [IU]/ML
10 INJECTION, SOLUTION SUBCUTANEOUS DAILY
Status: DISCONTINUED | OUTPATIENT
Start: 2024-06-16 | End: 2024-06-16 | Stop reason: HOSPADM

## 2024-06-15 RX ORDER — NAPROXEN SODIUM 220 MG/1
81 TABLET, FILM COATED ORAL DAILY
Status: DISCONTINUED | OUTPATIENT
Start: 2024-06-15 | End: 2024-06-16 | Stop reason: HOSPADM

## 2024-06-15 RX ORDER — NAPROXEN SODIUM 220 MG/1
243 TABLET, FILM COATED ORAL
Status: COMPLETED | OUTPATIENT
Start: 2024-06-15 | End: 2024-06-15

## 2024-06-15 RX ORDER — INSULIN ASPART 100 [IU]/ML
0-5 INJECTION, SOLUTION INTRAVENOUS; SUBCUTANEOUS
Status: DISCONTINUED | OUTPATIENT
Start: 2024-06-15 | End: 2024-06-16 | Stop reason: HOSPADM

## 2024-06-15 RX ORDER — CARVEDILOL 25 MG/1
25 TABLET ORAL 2 TIMES DAILY
Status: DISCONTINUED | OUTPATIENT
Start: 2024-06-15 | End: 2024-06-16 | Stop reason: HOSPADM

## 2024-06-15 RX ORDER — ALUMINUM HYDROXIDE, MAGNESIUM HYDROXIDE, AND SIMETHICONE 1200; 120; 1200 MG/30ML; MG/30ML; MG/30ML
30 SUSPENSION ORAL 4 TIMES DAILY PRN
Status: DISCONTINUED | OUTPATIENT
Start: 2024-06-15 | End: 2024-06-16 | Stop reason: HOSPADM

## 2024-06-15 RX ORDER — GABAPENTIN 300 MG/1
300 CAPSULE ORAL 2 TIMES DAILY
Status: DISCONTINUED | OUTPATIENT
Start: 2024-06-16 | End: 2024-06-16 | Stop reason: HOSPADM

## 2024-06-15 RX ORDER — PANTOPRAZOLE SODIUM 40 MG/1
40 TABLET, DELAYED RELEASE ORAL DAILY
Status: DISCONTINUED | OUTPATIENT
Start: 2024-06-15 | End: 2024-06-16 | Stop reason: HOSPADM

## 2024-06-15 RX ORDER — GLUCAGON 1 MG
1 KIT INJECTION
Status: DISCONTINUED | OUTPATIENT
Start: 2024-06-15 | End: 2024-06-16 | Stop reason: HOSPADM

## 2024-06-15 RX ORDER — PREDNISONE 5 MG/1
5 TABLET ORAL DAILY
Status: DISCONTINUED | OUTPATIENT
Start: 2024-06-15 | End: 2024-06-16 | Stop reason: HOSPADM

## 2024-06-15 RX ORDER — GABAPENTIN 300 MG/1
300 CAPSULE ORAL 3 TIMES DAILY
Status: DISCONTINUED | OUTPATIENT
Start: 2024-06-15 | End: 2024-06-15

## 2024-06-15 RX ORDER — VALSARTAN 160 MG/1
320 TABLET ORAL DAILY
Status: DISCONTINUED | OUTPATIENT
Start: 2024-06-15 | End: 2024-06-16 | Stop reason: HOSPADM

## 2024-06-15 RX ORDER — FUROSEMIDE 10 MG/ML
40 INJECTION INTRAMUSCULAR; INTRAVENOUS
Status: COMPLETED | OUTPATIENT
Start: 2024-06-15 | End: 2024-06-15

## 2024-06-15 RX ORDER — ATORVASTATIN CALCIUM 40 MG/1
40 TABLET, FILM COATED ORAL DAILY
Status: DISCONTINUED | OUTPATIENT
Start: 2024-06-15 | End: 2024-06-16 | Stop reason: HOSPADM

## 2024-06-15 RX ORDER — TALC
6 POWDER (GRAM) TOPICAL NIGHTLY PRN
Status: DISCONTINUED | OUTPATIENT
Start: 2024-06-15 | End: 2024-06-16 | Stop reason: HOSPADM

## 2024-06-15 RX ORDER — NALOXONE HCL 0.4 MG/ML
0.02 VIAL (ML) INJECTION
Status: DISCONTINUED | OUTPATIENT
Start: 2024-06-15 | End: 2024-06-16 | Stop reason: HOSPADM

## 2024-06-15 RX ORDER — FUROSEMIDE 10 MG/ML
40 INJECTION INTRAMUSCULAR; INTRAVENOUS 2 TIMES DAILY
Status: DISCONTINUED | OUTPATIENT
Start: 2024-06-15 | End: 2024-06-16 | Stop reason: HOSPADM

## 2024-06-15 RX ORDER — SODIUM CHLORIDE 0.9 % (FLUSH) 0.9 %
10 SYRINGE (ML) INJECTION EVERY 12 HOURS PRN
Status: DISCONTINUED | OUTPATIENT
Start: 2024-06-15 | End: 2024-06-16 | Stop reason: HOSPADM

## 2024-06-15 RX ORDER — ACETAMINOPHEN 325 MG/1
650 TABLET ORAL EVERY 6 HOURS PRN
Status: DISCONTINUED | OUTPATIENT
Start: 2024-06-15 | End: 2024-06-16 | Stop reason: HOSPADM

## 2024-06-15 RX ORDER — LANOLIN ALCOHOL/MO/W.PET/CERES
400 CREAM (GRAM) TOPICAL 2 TIMES DAILY
Status: DISCONTINUED | OUTPATIENT
Start: 2024-06-15 | End: 2024-06-16 | Stop reason: HOSPADM

## 2024-06-15 RX ADMIN — APIXABAN 5 MG: 5 TABLET, FILM COATED ORAL at 10:06

## 2024-06-15 RX ADMIN — TACROLIMUS 0.5 MG: 0.5 CAPSULE ORAL at 05:06

## 2024-06-15 RX ADMIN — PANTOPRAZOLE SODIUM 40 MG: 40 TABLET, DELAYED RELEASE ORAL at 01:06

## 2024-06-15 RX ADMIN — MYCOPHENOLATE MOFETIL 1000 MG: 250 CAPSULE ORAL at 10:06

## 2024-06-15 RX ADMIN — FUROSEMIDE 40 MG: 10 INJECTION, SOLUTION INTRAVENOUS at 11:06

## 2024-06-15 RX ADMIN — VALSARTAN 320 MG: 160 TABLET, FILM COATED ORAL at 01:06

## 2024-06-15 RX ADMIN — INSULIN ASPART 1 UNITS: 100 INJECTION, SOLUTION INTRAVENOUS; SUBCUTANEOUS at 10:06

## 2024-06-15 RX ADMIN — FUROSEMIDE 40 MG: 10 INJECTION, SOLUTION INTRAVENOUS at 06:06

## 2024-06-15 RX ADMIN — Medication 400 MG: at 10:06

## 2024-06-15 RX ADMIN — CARVEDILOL 25 MG: 25 TABLET, FILM COATED ORAL at 10:06

## 2024-06-15 RX ADMIN — ASPIRIN 81 MG CHEWABLE TABLET 81 MG: 81 TABLET CHEWABLE at 01:06

## 2024-06-15 RX ADMIN — ASPIRIN 81 MG CHEWABLE TABLET 243 MG: 81 TABLET CHEWABLE at 11:06

## 2024-06-15 RX ADMIN — ATORVASTATIN CALCIUM 40 MG: 40 TABLET, FILM COATED ORAL at 01:06

## 2024-06-15 RX ADMIN — PREDNISONE 5 MG: 5 TABLET ORAL at 01:06

## 2024-06-15 RX ADMIN — GABAPENTIN 300 MG: 300 CAPSULE ORAL at 10:06

## 2024-06-15 RX ADMIN — DRONEDARONE 400 MG: 400 TABLET, FILM COATED ORAL at 04:06

## 2024-06-15 NOTE — H&P
[facility] Dept  Hospital Medicine  History & Physical    Patient Name: Ravi Zamorano  MRN: [id number]  Patient Class: OP- Observation  Admission Date: 6/15/2024  Attending Physician: Fan Aldridge MD   Primary Care Provider: Mima Mack MD         Patient information was obtained from patient and ER records.     Subjective:     Principal Problem:Acute on chronic heart failure    Chief Complaint:   Chief Complaint   Patient presents with    Leg Swelling     Ablation on Tuesday, now feeling sob with bilateral leg swelling    Shortness of Breath        HPI: Mr Zamorano is a 68 y.o. male with a PMH of ESRD s/p kidney transplant 2016, AF on eliquis s/p ablation 6/11, T2DM on insulin, HCV s/p harvoni, HTN, HLD, CHF (LV diastolic dysfunction, pEF), who presents with worsening SOB and LE edema for the last few days and cough productive of whitish sputum last night so he presented to the ED. Patient was taken off spironolactone in March and taken off HCTZ at the beginning of April. He called his PCP and restarted both medications but currently is not on any of them. He does not follow a strict low salt diet, but denies any recent high salt meals. Patient denies palpitations, chest pain, cough, fever, chills, abdominal pain, N/V, urinary symptoms, light headedness, headache, weakness. His pressure at home have been running high. He is compliant with home medications.  No tobacco use. Occasional alcohol consumption.     In the ED his BP was 173/79, R 70, RR 16 SpO2 98% on RA. Labs significant for HB 7.4, Na 133, K 4.1, BUN 31, Cr 1.9, BNP 3203, Troponin 0.934. CXR unremarkable for pulmonary edema. Admitted to hospital medicine for management of heart failure exacerbation.    Past Medical History:   Diagnosis Date    Anticoagulant long-term use     Anxiety 07/29/2014    Arthritis     atrial fibrillation     Bilateral diabetic retinopathy 2017    CKD (chronic kidney disease) stage 2, GFR 60-89  ml/min 12/28/2016    CKD (chronic kidney disease) stage 4, GFR 15-29 ml/min 07/29/2014    Colon polyps 2014    Depression - situational 07/29/2014    Diabetes mellitus     Diabetes type 2 since 2000 07/29/2014    Diabetic neuropathy 07/29/2014    Encounter for blood transfusion     History of cardioversion 10/03/2019    History of hepatitis C, s/p successful Rx w/ SVR24 - 9/2017 07/29/2014    Genotype 1a, treatment naive 10/2014 liver biopsy - grade 1 / stage 1 Completed Harvoni w/ SVR    Hyperlipidemia 07/29/2014    Hypertension     Neuropathy     Organ transplant candidate 07/29/2014    Pancreatitis     S/P cadaveric kidney transplant 11/5/2016. ESRD secondary to HTN/DMII 11/05/2016    Stage 3a chronic kidney disease 12/28/2016    Type 2 diabetes mellitus with stage 3a chronic kidney disease, with long-term current use of insulin 07/29/2014       Past Surgical History:   Procedure Laterality Date    ABLATION OF ARRHYTHMOGENIC FOCUS FOR ATRIAL FIBRILLATION N/A 6/11/2024    Procedure: Ablation atrial fibrillation;  Surgeon: Bronson Bowden MD;  Location: [facility] EP LAB;  Service: Cardiology;  Laterality: N/A;  AF, FELICIANO (cx if SR), PVI, RFA, Carto, Gen, GP, 3 Prep    ABLATION, ATRIAL FLUTTER, TYPICAL  6/11/2024    Procedure: Ablation, Atrial Flutter, Typical;  Surgeon: Bronson Bowden MD;  Location: [facility] EP LAB;  Service: Cardiology;;    APPENDECTOMY      CARDIOVERSION  6/11/2024    Procedure: Cardioversion;  Surgeon: Bronson Bowden MD;  Location: [facility] EP LAB;  Service: Cardiology;;    CATARACT EXTRACTION W/  INTRAOCULAR LENS IMPLANT Right 3/13/2024    Procedure: EXTRACTION, CATARACT, WITH IOL INSERTION;  Surgeon: Jennifer Palacio MD;  Location: [facility] OR;  Service: Ophthalmology;  Laterality: Right;    CATARACT EXTRACTION W/  INTRAOCULAR LENS IMPLANT Left 4/10/2024    Procedure: EXTRACTION, CATARACT, WITH IOL INSERTION;  Surgeon: Jennifer Palacio MD;  Location: [facility] OR;  Service: Ophthalmology;  Laterality: Left;     COLONOSCOPY N/A 12/21/2020    Procedure: COLONOSCOPY;  Surgeon: Tico Bell MD;  [facility]  Service: Endoscopy;  Laterality: N/A;  ok to hold eliquis per Dr. Valadez, see telephone encounter 11/13/2020-MS  covid test 12/18 [place]    KIDNEY TRANSPLANT      TRANSESOPHAGEAL ECHOCARDIOGRAPHY N/A 8/26/2019    Procedure: ECHOCARDIOGRAM, TRANSESOPHAGEAL;  Surgeon: Dosc Diagnostic Provider;  Location: [facility] EP LAB;  Service: Cardiology;  Laterality: N/A;    TRANSESOPHAGEAL ECHOCARDIOGRAPHY N/A 6/11/2024    Procedure: ECHOCARDIOGRAM, TRANSESOPHAGEAL;  Surgeon: Grayson Lin III, MD;  Location: [facility] EP LAB;  Service: Cardiology;  Laterality: N/A;    TREATMENT OF CARDIAC ARRHYTHMIA N/A 8/26/2019    Procedure: CARDIOVERSION;  Surgeon: Bronson Bowden MD;  Location: [facility] EP LAB;  Service: Cardiology;  Laterality: N/A;  AF, FELICIANO, DCCV, MAC, GP, 3 PREP       Review of patient's allergies indicates:   Allergen Reactions    Nifedipine Other (See Comments)     Gingival hyperplasia       Current Facility-Administered Medications on File Prior to Encounter   Medication    balanced salt irrigation intra-ocular solution 1 drop    phenylephrine HCL 10% ophthalmic solution 1 drop    proparacaine 0.5 % ophthalmic solution 1 drop    sodium chloride 0.9% flush 10 mL    TETRAcaine HCl (PF) 0.5 % Drop 1 drop     Current Outpatient Medications on File Prior to Encounter   Medication Sig    apixaban (ELIQUIS) 5 mg Tab Take 1 tablet (5 mg total) by mouth 2 (two) times daily.    aspirin 81 MG Chew Take 81 mg by mouth once daily.    atorvastatin (LIPITOR) 40 MG tablet Take 1 tablet (40 mg total) by mouth once daily.    blood sugar diagnostic Strp Use to check blood glucose 1 times daily, to use with insurance preferred meter    blood-glucose meter Misc Use to check blood glucose 1 times daily, to use with insurance preferred meter    blood-glucose sensor Kayla Gin 3, use as directed, change every 14 days , e 11.65     "carvediloL (COREG) 25 MG tablet Take 1 tablet (25 mg total) by mouth 2 (two) times daily.    dronedarone (MULTAQ) 400 mg Tab Take 1 tablet (400 mg total) by mouth 2 (two) times daily with meals.    empagliflozin (JARDIANCE) 10 mg tablet Take 1 tablet (10 mg total) by mouth once daily.    famotidine (PEPCID) 20 MG tablet Take 1 tablet (20 mg total) by mouth every evening.    furosemide (LASIX) 20 MG tablet Take 1 tablet (20 mg total) by mouth 2 (two) times a day.    gabapentin (NEURONTIN) 300 MG capsule Take 1 capsule by mouth in the morning, 1 capsule midday, and 3 capsules at night.    insulin aspart U-100 (NOVOLOG) 100 unit/mL (3 mL) InPn pen Inject 16 units w/ breakfast, 10 units at lunch and dinner scale 180-230+2, 231-280+4, 281-330+6, 331-380+8, >380+10.  *Max daily 66 units.*    insulin glargine U-100, Lantus, (LANTUS SOLOSTAR U-100 INSULIN) 100 unit/mL (3 mL) InPn pen Inject 20 Units into the skin every morning.    lancets 30 gauge Misc Use to check blood glucose 1 times daily, to use with insurance preferred meter    magnesium oxide (MAG-OX) 400 mg (241.3 mg magnesium) tablet Take 1 tablet (400 mg total) by mouth 2 (two) times daily.    meclizine (ANTIVERT) 25 mg tablet Take 1 tablet (25 mg total) by mouth 3 (three) times daily as needed for Dizziness.    mycophenolate (CELLCEPT) 250 mg Cap Take 4 capsules (1,000 mg total) by mouth 2 (two) times daily. HOLD cellcept until Monday (4/8)    pantoprazole (PROTONIX) 40 MG tablet Take 1 tablet (40 mg total) by mouth once daily.    pen needle, diabetic (BD ULTRA-FINE LO PEN NEEDLE) 32 gauge x 5/32" Ndle USE TO ADMINISTER INSULIN 4 TIMES DAILY.    pen needle, diabetic (BD ULTRA-FINE LO PEN NEEDLE) 32 gauge x 5/32" Ndle use four times a day    predniSONE (DELTASONE) 5 MG tablet Take 1 tablet (5 mg total) by mouth once daily.    tacrolimus (PROGRAF) 0.5 MG Cap Take 1 capsule (0.5 mg total) by mouth every 12 (twelve) hours.    valsartan (DIOVAN) 320 MG tablet Take " 1 tablet (320 mg total) by mouth once daily.    [DISCONTINUED] insulin lispro (HUMALOG KWIKPEN INSULIN) 100 unit/mL pen Inject 18 units w/ breakfast, 16 units w/ lunch and dinner plus scale 150-200 +2, 201-250 +4, 251-300 +6, 301-350 +8.     Family History       Problem Relation (Age of Onset)    Cancer Brother    Diabetes Mother    Heart disease Mother, Father    Hypertension Mother, Brother          Tobacco Use    Smoking status: Former     Current packs/day: 0.00     Average packs/day: 0.5 packs/day for 32.0 years (16.0 ttl pk-yrs)     Types: Cigarettes     Start date: 1984     Quit date: 2016     Years since quittin.5    Smokeless tobacco: Never    Tobacco comments:     Pt reports that he quit in , but started up again in . pt reports he is currently working on quitting again   Substance and Sexual Activity    Alcohol use: Yes     Comment: Pt reports occasional beers on Sundays. Pt reports drinking daily prior to ESRD diagnosis.    Drug use: No    Sexual activity: Yes     Partners: Female     Review of Systems   Constitutional:  Positive for activity change and fatigue.   Respiratory:  Positive for cough and shortness of breath.    Cardiovascular:  Positive for leg swelling. Negative for chest pain and palpitations.   Gastrointestinal:  Negative for abdominal pain, nausea and vomiting.   Genitourinary:  Negative for frequency and urgency.   Musculoskeletal:  Negative for arthralgias and myalgias.   Neurological:  Positive for weakness. Negative for light-headedness and numbness.   Psychiatric/Behavioral:  Negative for agitation, confusion and decreased concentration.      Objective:     Vital Signs (Most Recent):  Temp: 98 °F (36.7 °C) (06/15/24 1241)  Pulse: 61 (06/15/24 1241)  Resp: 18 (06/15/24 1241)  BP: (!) 149/70 (06/15/24 1241)  SpO2: 98 % (06/15/24 1241) Vital Signs (24h Range):  Temp:  [98 °F (36.7 °C)-98.3 °F (36.8 °C)] 98 °F (36.7 °C)  Pulse:  [61-70] 61  Resp:  [16-18]  18  SpO2:  [97 %-98 %] 98 %  BP: (149-173)/(70-79) 149/70     Weight: 83.9 kg (185 lb)  Body mass index is 24.41 kg/m².     Physical Exam  Constitutional:       Appearance: Normal appearance.   Cardiovascular:      Rate and Rhythm: Normal rate. Rhythm irregular.      Pulses: Normal pulses.      Heart sounds: Normal heart sounds.   Pulmonary:      Breath sounds: Rales (faint) present.   Abdominal:      General: Bowel sounds are normal. There is distension.      Palpations: Abdomen is soft.   Musculoskeletal:      Right lower leg: Edema present.      Left lower leg: Edema present.      Comments: Edema up to shins, lower extremities have skin hyperpigmentation with excoriations. Diminished pedal pulses   Neurological:      General: No focal deficit present.      Mental Status: He is alert and oriented to person, place, and time.   Psychiatric:         Mood and Affect: Mood normal.         Behavior: Behavior normal.                Significant Labs: All pertinent labs within the past 24 hours have been reviewed.    Significant Imaging: I have reviewed all pertinent imaging results/findings within the past 24 hours.  Assessment/Plan:     * Acute on chronic heart failure  Patient is identified as having Diastolic (HFpEF) heart failure that is Acute on chronic. CHF is currently uncontrolled due to Dyspnea not returned to baseline after 1 doses of IV diuretic. Latest ECHO performed and demonstrates- Results for orders placed during the hospital encounter of 05/02/24    Echo    Interpretation Summary    Irregularly irregular rhythm was present during the study    Left Ventricle: The left ventricle is normal in size. Ventricular mass is normal. Normal wall thickness. Normal wall motion. There is low normal systolic function with a visually estimated ejection fraction of 50 - 55%. Unable to assess diastolic function due to atrial fibrillation. Singnificant beat to beat variabilty due  to AF.    Left Ventricle: Unable to assess  diastolic function due to atrial fibrillation. Normal left ventricular filling pressure.    Right Ventricle: Normal right ventricular cavity size. Wall thickness is normal. Systolic function is normal.    Left Atrium: Left atrium is severely dilated.    Right Atrium: Right atrium is mildly dilated.    Aortic Valve: The aortic valve is a bicuspid valve. There is mild aortic valve sclerosis. There is mild annular calcification present. There is mild aortic regurgitation.    Mitral Valve: There is mild regurgitation.    Aorta: Aortic root is mildly dilated measuring 3.69 cm. Ascending aorta is normal measuring 3.44 cm.    Pulmonary Artery: The estimated pulmonary artery systolic pressure is 42 mmHg.    IVC/SVC: Intermediate venous pressure at 8 mmHg.    Pericardium: There is a small posterior effusion. No indication of cardiac tamponade. Left pleural effusion.  . Continue Beta Blocker, ACE/ARB, and Furosemide and monitor clinical status closely. Monitor on telemetry. Patient is on CHF pathway.  Monitor strict Is&Os and daily weights.  Place on fluid restriction of 1.5 L. Cardiology has not been consulted. Continue to stress to patient importance of self efficacy and  on diet for CHF. Last BNP reviewed- and noted below   Recent Labs   Lab 06/15/24  1032   BNP 3,203*      -I/V Lasix 40 MG BID  -Continue home spironolactone and HCTZ   -Strict In/out   -Daily weights  -Monitor electrolytes, replace as needed. Keep K >4 Mag >2  -Low salt cardiac diet.  -Restrict fluid intake to <1.5 L    Symptomatic anemia  Patient's anemia is currently controlled. Has not received any PRBCs to date. Etiology likely d/t  unclear etiology. Normal colonoscopy in the past  Current CBC reviewed-  Lab Results   Component Value Date    HGB 7.4 (L) 06/15/2024    HCT 30 (L) 06/15/2024     Monitor serial CBC and transfuse if patient becomes hemodynamically unstable, symptomatic or H/H drops below 7/21.    Paroxysmal A-fib  Patient with  "Paroxysmal (7 days or more) atrial fibrillation which is controlled currently with dronaderone. Patient is currently in sinus rhythm.[id number] Score: 2. . Anticoagulation indicated. Anticoagulation done with eliquis.    PAD (peripheral artery disease)  History of PAD, possible stent in the future.    -Continue aspirin and atorvastatin      Type 2 diabetes mellitus  Patient's FSGs are controlled on current medication regimen.  Last A1c reviewed-   Lab Results   Component Value Date    HGBA1C 6.8 (H) 05/02/2024     Most recent fingerstick glucose reviewed- No results for input(s): "POCTGLUCOSE" in the last 24 hours.  Current correctional scale  Low  Maintain anti-hyperglycemic dose as follows-   Antihyperglycemics (From admission, onward)      Start     Stop Route Frequency Ordered    06/15/24 1334  insulin aspart U-100 pen 0-5 Units         -- SubQ Before meals & nightly PRN 06/15/24 1235    06/15/24 1330  empagliflozin (Jardiance) tablet 10 mg        Question Answer Comment   Does this patient have a diagnosis of heart failure? Yes    Does this patient have type 1 diabetes or diabetic ketoacidosis? No    Does this patient have symptomatic hypotension? No    Is the patient NPO or pending major surgery in next 3 days or less? No        -- Oral Daily 06/15/24 1235          Hold Oral hypoglycemics while patient is in the hospital. Currently blood glucose is low, will keep monitoring with LDSSI.   -POCT qhs and TID with meals.    Stage 3 chronic kidney disease  Creatine stable for now. BMP reviewed- noted Estimated Creatinine Clearance: 43.2 mL/min (A) (based on SCr of 1.9 mg/dL (H)). according to latest data. Based on current GFR, CKD stage is stage 3 - GFR 30-59.  Monitor UOP and serial BMP and adjust therapy as needed. Renally dose meds. Avoid nephrotoxic medications and procedures.    S/P cadaveric kidney transplant 11/5/2016. ESRD secondary to HTN/DMII  Patient is s/p kidney transplant 11/2016,  secondary to ESRD " secondary to HTN, and DM. Currently taking immunosuppressive medications.    -Continue Prograf, mycophenolate and prednisone  -Prograf levels every day  -Continue protonix  -Avoid nephrotoxic agents  -Monitor with diuresis  -Inpatient consult to K[facility] for medication management     Hyperlipidemia  Continue atorvastatin 40 mg       Essential (primary) hypertension  Chronic, controlled. Latest blood pressure and vitals reviewed-     Temp:  [98 °F (36.7 °C)-98.3 °F (36.8 °C)]   Pulse:  [61-70]   Resp:  [16-18]   BP: (149-173)/(70-79)   SpO2:  [97 %-98 %] .   Home meds for hypertension were reviewed and noted below.   Hypertension Medications               carvediloL (COREG) 25 MG tablet Take 1 tablet (25 mg total) by mouth 2 (two) times daily.    furosemide (LASIX) 20 MG tablet Take 1 tablet (20 mg total) by mouth 2 (two) times a day.    valsartan (DIOVAN) 320 MG tablet Take 1 tablet (320 mg total) by mouth once daily.            While in the hospital, will manage blood pressure as follows; Continue home antihypertensive regimen    Will utilize p.r.n. blood pressure medication only if patient's blood pressure greater than 180/110 and he develops symptoms such as worsening chest pain or shortness of breath.      VTE Risk Mitigation (From admission, onward)           Ordered     apixaban tablet 5 mg  2 times daily         06/15/24 1235     IP VTE HIGH RISK PATIENT  Once         06/15/24 1235     Place sequential compression device  Until discontinued         06/15/24 1235                           On 06/15/2024, patient should be placed in hospital observation services under my care in collaboration with Dr Aldridge.         Quin Smith MD  Department of Hospital Medicine  [facility] - Emergency Dept

## 2024-06-15 NOTE — ASSESSMENT & PLAN NOTE
Creatine stable for now. BMP reviewed- noted Estimated Creatinine Clearance: 43.2 mL/min (A) (based on SCr of 1.9 mg/dL (H)). according to latest data. Based on current GFR, CKD stage is stage 3 - GFR 30-59.  Monitor UOP and serial BMP and adjust therapy as needed. Renally dose meds. Avoid nephrotoxic medications and procedures.

## 2024-06-15 NOTE — ASSESSMENT & PLAN NOTE
Patient with Paroxysmal (7 days or more) atrial fibrillation which is controlled currently with dronaderone. Patient is currently in sinus rhythm.[id number] Score: 2. . Anticoagulation indicated. Anticoagulation done with eliquis.

## 2024-06-15 NOTE — SUBJECTIVE & OBJECTIVE
Past Medical History:   Diagnosis Date    Anticoagulant long-term use     Anxiety 07/29/2014    Arthritis     atrial fibrillation     Bilateral diabetic retinopathy 2017    CKD (chronic kidney disease) stage 2, GFR 60-89 ml/min 12/28/2016    CKD (chronic kidney disease) stage 4, GFR 15-29 ml/min 07/29/2014    Colon polyps 2014    Depression - situational 07/29/2014    Diabetes mellitus     Diabetes type 2 since 2000 07/29/2014    Diabetic neuropathy 07/29/2014    Encounter for blood transfusion     History of cardioversion 10/03/2019    History of hepatitis C, s/p successful Rx w/ SVR24 - 9/2017 07/29/2014    Genotype 1a, treatment naive 10/2014 liver biopsy - grade 1 / stage 1 Completed Harvoni w/ SVR    Hyperlipidemia 07/29/2014    Hypertension     Neuropathy     Organ transplant candidate 07/29/2014    Pancreatitis     S/P cadaveric kidney transplant 11/5/2016. ESRD secondary to HTN/DMII 11/05/2016    Stage 3a chronic kidney disease 12/28/2016    Type 2 diabetes mellitus with stage 3a chronic kidney disease, with long-term current use of insulin 07/29/2014       Past Surgical History:   Procedure Laterality Date    ABLATION OF ARRHYTHMOGENIC FOCUS FOR ATRIAL FIBRILLATION N/A 6/11/2024    Procedure: Ablation atrial fibrillation;  Surgeon: Bronson Bowden MD;  Location: [facility] EP LAB;  Service: Cardiology;  Laterality: N/A;  AF, FELICIANO (cx if SR), PVI, RFA, Carto, Gen, GP, 3 Prep    ABLATION, ATRIAL FLUTTER, TYPICAL  6/11/2024    Procedure: Ablation, Atrial Flutter, Typical;  Surgeon: Bronson Bowden MD;  Location: [facility] EP LAB;  Service: Cardiology;;    APPENDECTOMY      CARDIOVERSION  6/11/2024    Procedure: Cardioversion;  Surgeon: Bronson Bowden MD;  Location: [facility] EP LAB;  Service: Cardiology;;    CATARACT EXTRACTION W/  INTRAOCULAR LENS IMPLANT Right 3/13/2024    Procedure: EXTRACTION, CATARACT, WITH IOL INSERTION;  Surgeon: Jennifer Palacio MD;  Location: [facility] OR;  Service: Ophthalmology;  Laterality: Right;     CATARACT EXTRACTION W/  INTRAOCULAR LENS IMPLANT Left 4/10/2024    Procedure: EXTRACTION, CATARACT, WITH IOL INSERTION;  Surgeon: Jennifer Palacio MD;  Location: [facility] OR;  Service: Ophthalmology;  Laterality: Left;    COLONOSCOPY N/A 12/21/2020    Procedure: COLONOSCOPY;  Surgeon: Tico Bell MD;  Location: [facility] ENDO (4TH FLR);  Service: Endoscopy;  Laterality: N/A;  ok to hold eliquis per Dr. Valadez, see telephone encounter 11/13/2020-MS  covid test 12/18 Ross    KIDNEY TRANSPLANT      TRANSESOPHAGEAL ECHOCARDIOGRAPHY N/A 8/26/2019    Procedure: ECHOCARDIOGRAM, TRANSESOPHAGEAL;  Surgeon: Dosc Diagnostic Provider;  Location: [facility] EP LAB;  Service: Cardiology;  Laterality: N/A;    TRANSESOPHAGEAL ECHOCARDIOGRAPHY N/A 6/11/2024    Procedure: ECHOCARDIOGRAM, TRANSESOPHAGEAL;  Surgeon: Grayson Lin III, MD;  Location: [facility] EP LAB;  Service: Cardiology;  Laterality: N/A;    TREATMENT OF CARDIAC ARRHYTHMIA N/A 8/26/2019    Procedure: CARDIOVERSION;  Surgeon: Bronson Bowden MD;  Location: [facility] EP LAB;  Service: Cardiology;  Laterality: N/A;  AF, FELICIANO, DCCV, MAC, GP, 3 PREP       Review of patient's allergies indicates:   Allergen Reactions    Nifedipine Other (See Comments)     Gingival hyperplasia       Current Facility-Administered Medications on File Prior to Encounter   Medication    balanced salt irrigation intra-ocular solution 1 drop    phenylephrine HCL 10% ophthalmic solution 1 drop    proparacaine 0.5 % ophthalmic solution 1 drop    sodium chloride 0.9% flush 10 mL    TETRAcaine HCl (PF) 0.5 % Drop 1 drop     Current Outpatient Medications on File Prior to Encounter   Medication Sig    apixaban (ELIQUIS) 5 mg Tab Take 1 tablet (5 mg total) by mouth 2 (two) times daily.    aspirin 81 MG Chew Take 81 mg by mouth once daily.    atorvastatin (LIPITOR) 40 MG tablet Take 1 tablet (40 mg total) by mouth once daily.    blood sugar diagnostic Strp Use to check blood glucose 1 times daily, to use with  "insurance preferred meter    blood-glucose meter Misc Use to check blood glucose 1 times daily, to use with insurance preferred meter    blood-glucose sensor Kayla Gin 3, use as directed, change every 14 days , e 11.65    carvediloL (COREG) 25 MG tablet Take 1 tablet (25 mg total) by mouth 2 (two) times daily.    dronedarone (MULTAQ) 400 mg Tab Take 1 tablet (400 mg total) by mouth 2 (two) times daily with meals.    empagliflozin (JARDIANCE) 10 mg tablet Take 1 tablet (10 mg total) by mouth once daily.    famotidine (PEPCID) 20 MG tablet Take 1 tablet (20 mg total) by mouth every evening.    furosemide (LASIX) 20 MG tablet Take 1 tablet (20 mg total) by mouth 2 (two) times a day.    gabapentin (NEURONTIN) 300 MG capsule Take 1 capsule by mouth in the morning, 1 capsule midday, and 3 capsules at night.    insulin aspart U-100 (NOVOLOG) 100 unit/mL (3 mL) InPn pen Inject 16 units w/ breakfast, 10 units at lunch and dinner scale 180-230+2, 231-280+4, 281-330+6, 331-380+8, >380+10.  *Max daily 66 units.*    insulin glargine U-100, Lantus, (LANTUS SOLOSTAR U-100 INSULIN) 100 unit/mL (3 mL) InPn pen Inject 20 Units into the skin every morning.    lancets 30 gauge Misc Use to check blood glucose 1 times daily, to use with insurance preferred meter    magnesium oxide (MAG-OX) 400 mg (241.3 mg magnesium) tablet Take 1 tablet (400 mg total) by mouth 2 (two) times daily.    meclizine (ANTIVERT) 25 mg tablet Take 1 tablet (25 mg total) by mouth 3 (three) times daily as needed for Dizziness.    mycophenolate (CELLCEPT) 250 mg Cap Take 4 capsules (1,000 mg total) by mouth 2 (two) times daily. HOLD cellcept until Monday (4/8)    pantoprazole (PROTONIX) 40 MG tablet Take 1 tablet (40 mg total) by mouth once daily.    pen needle, diabetic (BD ULTRA-FINE LO PEN NEEDLE) 32 gauge x 5/32" Ndle USE TO ADMINISTER INSULIN 4 TIMES DAILY.    pen needle, diabetic (BD ULTRA-FINE LO PEN NEEDLE) 32 gauge x 5/32" Ndle use four times a day    " predniSONE (DELTASONE) 5 MG tablet Take 1 tablet (5 mg total) by mouth once daily.    tacrolimus (PROGRAF) 0.5 MG Cap Take 1 capsule (0.5 mg total) by mouth every 12 (twelve) hours.    valsartan (DIOVAN) 320 MG tablet Take 1 tablet (320 mg total) by mouth once daily.    [DISCONTINUED] insulin lispro (HUMALOG KWIKPEN INSULIN) 100 unit/mL pen Inject 18 units w/ breakfast, 16 units w/ lunch and dinner plus scale 150-200 +2, 201-250 +4, 251-300 +6, 301-350 +8.     Family History       Problem Relation (Age of Onset)    Cancer Brother    Diabetes Mother    Heart disease Mother, Father    Hypertension Mother, Brother          Tobacco Use    Smoking status: Former     Current packs/day: 0.00     Average packs/day: 0.5 packs/day for 32.0 years (16.0 ttl pk-yrs)     Types: Cigarettes     Start date: 1984     Quit date: 2016     Years since quittin.5    Smokeless tobacco: Never    Tobacco comments:     Pt reports that he quit in , but started up again in . pt reports he is currently working on quitting again   Substance and Sexual Activity    Alcohol use: Yes     Comment: Pt reports occasional beers on Sundays. Pt reports drinking daily prior to ESRD diagnosis.    Drug use: No    Sexual activity: Yes     Partners: Female     Review of Systems   Constitutional:  Positive for activity change and fatigue.   Respiratory:  Positive for cough and shortness of breath.    Cardiovascular:  Positive for leg swelling. Negative for chest pain and palpitations.   Gastrointestinal:  Negative for abdominal pain, nausea and vomiting.   Genitourinary:  Negative for frequency and urgency.   Musculoskeletal:  Negative for arthralgias and myalgias.   Neurological:  Positive for weakness. Negative for light-headedness and numbness.   Psychiatric/Behavioral:  Negative for agitation, confusion and decreased concentration.      Objective:     Vital Signs (Most Recent):  Temp: 98 °F (36.7 °C) (06/15/24 1241)  Pulse: 61  (06/15/24 1241)  Resp: 18 (06/15/24 1241)  BP: (!) 149/70 (06/15/24 1241)  SpO2: 98 % (06/15/24 1241) Vital Signs (24h Range):  Temp:  [98 °F (36.7 °C)-98.3 °F (36.8 °C)] 98 °F (36.7 °C)  Pulse:  [61-70] 61  Resp:  [16-18] 18  SpO2:  [97 %-98 %] 98 %  BP: (149-173)/(70-79) 149/70     Weight: 83.9 kg (185 lb)  Body mass index is 24.41 kg/m².     Physical Exam  Constitutional:       Appearance: Normal appearance.   Cardiovascular:      Rate and Rhythm: Normal rate. Rhythm irregular.      Pulses: Normal pulses.      Heart sounds: Normal heart sounds.   Pulmonary:      Breath sounds: Rales (faint) present.   Abdominal:      General: Bowel sounds are normal. There is distension.      Palpations: Abdomen is soft.   Musculoskeletal:      Right lower leg: Edema present.      Left lower leg: Edema present.      Comments: Edema up to shins, lower extremities have skin hyperpigmentation with excoriations. Diminished pedal pulses   Neurological:      General: No focal deficit present.      Mental Status: He is alert and oriented to person, place, and time.   Psychiatric:         Mood and Affect: Mood normal.         Behavior: Behavior normal.                Significant Labs: All pertinent labs within the past 24 hours have been reviewed.    Significant Imaging: I have reviewed all pertinent imaging results/findings within the past 24 hours.

## 2024-06-15 NOTE — ASSESSMENT & PLAN NOTE
Patient's anemia is currently controlled. Has not received any PRBCs to date. Etiology likely d/t  unclear etiology. Normal colonoscopy in the past  Current CBC reviewed-  Lab Results   Component Value Date    HGB 7.4 (L) 06/15/2024    HCT 30 (L) 06/15/2024     Monitor serial CBC and transfuse if patient becomes hemodynamically unstable, symptomatic or H/H drops below 7/21.

## 2024-06-15 NOTE — HPI
Mr Zamorano is a 68 y.o. male with a PMH of ESRD s/p kidney transplant 2016, AF on eliquis s/p ablation 6/11, T2DM on insulin, HCV s/p harvoni, HTN, HLD, CHF (LV diastolic dysfunction, pEF), who presents with worsening SOB and LE edema for the last few days and cough productive of whitish sputum last night so he presented to the ED. Patient was taken off spironolactone in March and taken off HCTZ at the beginning of April. He called his PCP and restarted both medications but currently is not on any of them. He does not follow a strict low salt diet, but denies any recent high salt meals. Patient denies palpitations, chest pain, cough, fever, chills, abdominal pain, N/V, urinary symptoms, light headedness, headache, weakness. His pressure at home have been running high. He is compliant with home medications.  No tobacco use. Occasional alcohol consumption.     In the ED his BP was 173/79, R 70, RR 16 SpO2 98% on RA. Labs significant for HB 7.4, Na 133, K 4.1, BUN 31, Cr 1.9, BNP 3203, Troponin 0.934. CXR unremarkable for pulmonary edema. Admitted to hospital medicine for management of heart failure exacerbation.

## 2024-06-15 NOTE — ASSESSMENT & PLAN NOTE
Patient is s/p kidney transplant 11/2016,  secondary to ESRD secondary to HTN, and DM. Currently taking immunosuppressive medications.    -Continue Prograf, mycophenolate and prednisone  -Prograf levels every day  -Continue protonix  -Avoid nephrotoxic agents  -Monitor with diuresis

## 2024-06-15 NOTE — ASSESSMENT & PLAN NOTE
Chronic, controlled. Latest blood pressure and vitals reviewed-     Temp:  [98 °F (36.7 °C)-98.3 °F (36.8 °C)]   Pulse:  [61-70]   Resp:  [16-18]   BP: (149-173)/(70-79)   SpO2:  [97 %-98 %] .   Home meds for hypertension were reviewed and noted below.   Hypertension Medications               carvediloL (COREG) 25 MG tablet Take 1 tablet (25 mg total) by mouth 2 (two) times daily.    furosemide (LASIX) 20 MG tablet Take 1 tablet (20 mg total) by mouth 2 (two) times a day.    valsartan (DIOVAN) 320 MG tablet Take 1 tablet (320 mg total) by mouth once daily.            While in the hospital, will manage blood pressure as follows; Continue home antihypertensive regimen    Will utilize p.r.n. blood pressure medication only if patient's blood pressure greater than 180/110 and he develops symptoms such as worsening chest pain or shortness of breath.

## 2024-06-15 NOTE — ASSESSMENT & PLAN NOTE
Patient has elevated troponin in the setting of recent ablation for atrial fibrillation. Denies any chest pain and EKG has been unremarkable.     -Continue to trend Troponins 0.934>0.948

## 2024-06-15 NOTE — ED NOTES
C/C: 68 y.o. male arrived to the ED via POV for c/o leg swelling. Patient with hx of CHF and kidney transplant presents for progressive swelling to BLE and abdomen x2 days accompanied by SOB. Patient reports going for ablation procedure Tuesday, 6/11/24, and noted a 5lb weight gain over the course of 2 days. Endorses exertional SOB and increased swelling to BLE and abdomen. Patient denies noncompliance with diuretics, chest pain, fevers, chills, syncope, or changes in urinary/bowel habits.     APPEARANCE: awake, alert, and appears to be in NAD. Pain score 0/10. Placed on cont cardiac monitoring, auto BP cuff, and cont pulse ox. Bed in lowest and locked position w/ side rails up x2. Call light w/n pt reach and instructed on how to use. Supportive wife at bedside.   SKIN: warm, dry and intact. No visible breakdown noted to extremities. +bruising noted to BUE  MUSCULOSKELETAL: Patient moving all extremities spontaneously, no obvious swelling or deformities noted. Ambulates with cane at baseline.  RESPIRATORY: Airway open and patent. +exertional shortness of breath. Respirations even, unlabored, equal bilaterally on inspiration and expiration.   CARDIAC: Regular HR. Denies CP, 1+ DP pulses bilaterally; +1 edema to BLE  ABDOMEN: S/NT, Denies N/V/D, abnormal BM, or changes in appetite. +distension   : Pt voids spontaneously, denies dysuria, hematuria, urinary frequency, or incontinence   NEUROLOGIC: AAO x 4; speaking and following commands appropriately. Equal strength in all extremities; denies numbness/tingling. Denies dizziness, lightheadedness, visual changes, or HA

## 2024-06-15 NOTE — ED NOTES
Pt's wife reported a dexcom reading of 54. CBG reassessed at 77. Apple juice and ismael crackers provided. Provider notified.

## 2024-06-15 NOTE — ASSESSMENT & PLAN NOTE
"Patient's FSGs are controlled on current medication regimen.  Last A1c reviewed-   Lab Results   Component Value Date    HGBA1C 6.8 (H) 05/02/2024     Most recent fingerstick glucose reviewed- No results for input(s): "POCTGLUCOSE" in the last 24 hours.  Current correctional scale  Low  Maintain anti-hyperglycemic dose as follows-   Antihyperglycemics (From admission, onward)      Start     Stop Route Frequency Ordered    06/15/24 1334  insulin aspart U-100 pen 0-5 Units         -- SubQ Before meals & nightly PRN 06/15/24 1235    06/15/24 1330  empagliflozin (Jardiance) tablet 10 mg        Question Answer Comment   Does this patient have a diagnosis of heart failure? Yes    Does this patient have type 1 diabetes or diabetic ketoacidosis? No    Does this patient have symptomatic hypotension? No    Is the patient NPO or pending major surgery in next 3 days or less? No        -- Oral Daily 06/15/24 1235          Hold Oral hypoglycemics while patient is in the hospital.  -POCT qhs and TID with meals.  "

## 2024-06-15 NOTE — ASSESSMENT & PLAN NOTE
Patient is identified as having Diastolic (HFpEF) heart failure that is Acute on chronic. CHF is currently uncontrolled due to Dyspnea not returned to baseline after 1 doses of IV diuretic. Latest ECHO performed and demonstrates- Results for orders placed during the hospital encounter of 05/02/24    Echo    Interpretation Summary    Irregularly irregular rhythm was present during the study    Left Ventricle: The left ventricle is normal in size. Ventricular mass is normal. Normal wall thickness. Normal wall motion. There is low normal systolic function with a visually estimated ejection fraction of 50 - 55%. Unable to assess diastolic function due to atrial fibrillation. Singnificant beat to beat variabilty due  to AF.    Left Ventricle: Unable to assess diastolic function due to atrial fibrillation. Normal left ventricular filling pressure.    Right Ventricle: Normal right ventricular cavity size. Wall thickness is normal. Systolic function is normal.    Left Atrium: Left atrium is severely dilated.    Right Atrium: Right atrium is mildly dilated.    Aortic Valve: The aortic valve is a bicuspid valve. There is mild aortic valve sclerosis. There is mild annular calcification present. There is mild aortic regurgitation.    Mitral Valve: There is mild regurgitation.    Aorta: Aortic root is mildly dilated measuring 3.69 cm. Ascending aorta is normal measuring 3.44 cm.    Pulmonary Artery: The estimated pulmonary artery systolic pressure is 42 mmHg.    IVC/SVC: Intermediate venous pressure at 8 mmHg.    Pericardium: There is a small posterior effusion. No indication of cardiac tamponade. Left pleural effusion.  . Continue Beta Blocker, ACE/ARB, and Furosemide and monitor clinical status closely. Monitor on telemetry. Patient is on CHF pathway.  Monitor strict Is&Os and daily weights.  Place on fluid restriction of 1.5 L. Cardiology has not been consulted. Continue to stress to patient importance of self efficacy and   on diet for CHF. Last BNP reviewed- and noted below   Recent Labs   Lab 06/15/24  1032   BNP 3,203*      -I/V Lasix 40 MG BID  -Continue home spironolactone and HCTZ   -Strict In/out   -Daily weights  -Monitor electrolytes, replace as needed. Keep K >4 Mag >2  -Low salt cardiac diet.  -Restrict fluid intake to <1.5 L

## 2024-06-15 NOTE — ASSESSMENT & PLAN NOTE
Patient with Persistent (7 days or more) atrial fibrillation which is controlled currently with  dronaderne . Patient is currently in sinus rhythm.[id number] Score: 2. . Anticoagulation indicated. Anticoagulation done with eliquis .

## 2024-06-15 NOTE — ED PROVIDER NOTES
Source of History:  Patient and chart    Chief complaint:  Leg Swelling (Ablation on Tuesday, now feeling sob with bilateral leg swelling) and Shortness of Breath      HPI:  Ravi Zamorano is a 68 y.o. male with a medical history as below presents to the emergency department with a chief complaint of shortness of breath and bilateral lower extremities swelling. States this has been ongoing since CTI ablation on Tuesday (6/11). He reports bilateral lower extremity swelling, right leg swelling > left. Also reports left lower extremity pain with activity but states this is not new for him. He does endorse 1 episode of palpitations yesterday, none since.  Also reports a cough last night that was productive with clear sputum.  He denies orthopnea, PND, and CP. He denies any recent fevers, chills, dizziness, syncope, or trauma. States he has been taking his Eliquis as prescribed.       Review of patient's allergies indicates:   Allergen Reactions    Nifedipine Other (See Comments)     Gingival hyperplasia       Current Facility-Administered Medications on File Prior to Encounter   Medication Dose Route Frequency Provider Last Rate Last Admin    balanced salt irrigation intra-ocular solution 1 drop  1 drop Right Eye On Call Procedure Jennifer Palacio MD        phenylephrine HCL 10% ophthalmic solution 1 drop  1 drop Right Eye PRN Jennifer Palacio MD        proparacaine 0.5 % ophthalmic solution 1 drop  1 drop Right Eye Daily PRN Jennifer Palacio MD        sodium chloride 0.9% flush 10 mL  10 mL Intravenous PRN Jennifer Palacio MD        TETRAcaine HCl (PF) 0.5 % Drop 1 drop  1 drop Right Eye PRN Jennifer Palacio MD         Current Outpatient Medications on File Prior to Encounter   Medication Sig Dispense Refill    apixaban (ELIQUIS) 5 mg Tab Take 1 tablet (5 mg total) by mouth 2 (two) times daily. 180 tablet 3    aspirin 81 MG Chew Take 81 mg by mouth once daily.      atorvastatin (LIPITOR) 40 MG tablet Take  1 tablet (40 mg total) by mouth once daily. 90 tablet 3    blood sugar diagnostic Strp Use to check blood glucose 1 times daily, to use with insurance preferred meter 100 each 11    blood-glucose meter Misc Use to check blood glucose 1 times daily, to use with insurance preferred meter 1 each 0    blood-glucose sensor Kayla Gin 3, use as directed, change every 14 days , e 11.65 2 each 11    carvediloL (COREG) 25 MG tablet Take 1 tablet (25 mg total) by mouth 2 (two) times daily. 180 tablet 3    dronedarone (MULTAQ) 400 mg Tab Take 1 tablet (400 mg total) by mouth 2 (two) times daily with meals. 60 tablet 11    empagliflozin (JARDIANCE) 10 mg tablet Take 1 tablet (10 mg total) by mouth once daily. 30 tablet 11    famotidine (PEPCID) 20 MG tablet Take 1 tablet (20 mg total) by mouth every evening. 90 tablet 3    furosemide (LASIX) 20 MG tablet Take 1 tablet (20 mg total) by mouth 2 (two) times a day. 180 tablet 3    gabapentin (NEURONTIN) 300 MG capsule Take 1 capsule by mouth in the morning, 1 capsule midday, and 3 capsules at night. 450 capsule 3    insulin aspart U-100 (NOVOLOG) 100 unit/mL (3 mL) InPn pen Inject 16 units w/ breakfast, 10 units at lunch and dinner scale 180-230+2, 231-280+4, 281-330+6, 331-380+8, >380+10.  *Max daily 66 units.* 30 mL 6    insulin glargine U-100, Lantus, (LANTUS SOLOSTAR U-100 INSULIN) 100 unit/mL (3 mL) InPn pen Inject 20 Units into the skin every morning. 15 mL 6    lancets 30 gauge Misc Use to check blood glucose 1 times daily, to use with insurance preferred meter 100 each 3    magnesium oxide (MAG-OX) 400 mg (241.3 mg magnesium) tablet Take 1 tablet (400 mg total) by mouth 2 (two) times daily. 60 tablet 6    meclizine (ANTIVERT) 25 mg tablet Take 1 tablet (25 mg total) by mouth 3 (three) times daily as needed for Dizziness. 21 tablet 3    mycophenolate (CELLCEPT) 250 mg Cap Take 4 capsules (1,000 mg total) by mouth 2 (two) times daily. HOLD cellcept until Monday (4/8) 240  "capsule 11    pantoprazole (PROTONIX) 40 MG tablet Take 1 tablet (40 mg total) by mouth once daily. 30 tablet 0    pen needle, diabetic (BD ULTRA-FINE LO PEN NEEDLE) 32 gauge x 5/32" Ndle USE TO ADMINISTER INSULIN 4 TIMES DAILY. 400 each 3    pen needle, diabetic (BD ULTRA-FINE LO PEN NEEDLE) 32 gauge x 5/32" Ndle use four times a day 100 each 11    predniSONE (DELTASONE) 5 MG tablet Take 1 tablet (5 mg total) by mouth once daily. 30 tablet 11    tacrolimus (PROGRAF) 0.5 MG Cap Take 1 capsule (0.5 mg total) by mouth every 12 (twelve) hours. 60 capsule 11    valsartan (DIOVAN) 320 MG tablet Take 1 tablet (320 mg total) by mouth once daily. 60 tablet 0    [DISCONTINUED] insulin lispro (HUMALOG KWIKPEN INSULIN) 100 unit/mL pen Inject 18 units w/ breakfast, 16 units w/ lunch and dinner plus scale 150-200 +2, 201-250 +4, 251-300 +6, 301-350 +8. 30 mL 4       PMH:  As per HPI and below:  Past Medical History:   Diagnosis Date    Anticoagulant long-term use     Anxiety 07/29/2014    Arthritis     atrial fibrillation     Bilateral diabetic retinopathy 2017    CKD (chronic kidney disease) stage 2, GFR 60-89 ml/min 12/28/2016    CKD (chronic kidney disease) stage 4, GFR 15-29 ml/min 07/29/2014    Colon polyps 2014    Depression - situational 07/29/2014    Diabetes mellitus     Diabetes type 2 since 2000 07/29/2014    Diabetic neuropathy 07/29/2014    Encounter for blood transfusion     History of cardioversion 10/03/2019    History of hepatitis C, s/p successful Rx w/ SVR24 - 9/2017 07/29/2014    Genotype 1a, treatment naive 10/2014 liver biopsy - grade 1 / stage 1 Completed Harvoni w/ SVR    Hyperlipidemia 07/29/2014    Hypertension     Neuropathy     Organ transplant candidate 07/29/2014    Pancreatitis     S/P cadaveric kidney transplant 11/5/2016. ESRD secondary to HTN/DMII 11/05/2016    Stage 3a chronic kidney disease 12/28/2016    Type 2 diabetes mellitus with stage 3a chronic kidney disease, with long-term current " use of insulin 07/29/2014     Past Surgical History:   Procedure Laterality Date    ABLATION OF ARRHYTHMOGENIC FOCUS FOR ATRIAL FIBRILLATION N/A 6/11/2024    Procedure: Ablation atrial fibrillation;  Surgeon: Bronson Bowden MD;  Location: [facility] EP LAB;  Service: Cardiology;  Laterality: N/A;  AF, FELICIANO (cx if SR), PVI, RFA, Carto, Gen, GP, 3 Prep    ABLATION, ATRIAL FLUTTER, TYPICAL  6/11/2024    Procedure: Ablation, Atrial Flutter, Typical;  Surgeon: Bronson Bowden MD;  Location: [facility] EP LAB;  Service: Cardiology;;    APPENDECTOMY      CARDIOVERSION  6/11/2024    Procedure: Cardioversion;  Surgeon: Bronson Bowden MD;  Location: [facility] EP LAB;  Service: Cardiology;;    CATARACT EXTRACTION W/  INTRAOCULAR LENS IMPLANT Right 3/13/2024    Procedure: EXTRACTION, CATARACT, WITH IOL INSERTION;  Surgeon: Jennifer Palacio MD;  Location: [facility] OR;  Service: Ophthalmology;  Laterality: Right;    CATARACT EXTRACTION W/  INTRAOCULAR LENS IMPLANT Left 4/10/2024    Procedure: EXTRACTION, CATARACT, WITH IOL INSERTION;  Surgeon: Jennifer Palacio MD;  Location: [facility] OR;  Service: Ophthalmology;  Laterality: Left;    COLONOSCOPY N/A 12/21/2020    Procedure: COLONOSCOPY;  Surgeon: Tico Bell MD;  Location: [facility] ENDO ([address] [place] [address]);  Service: Endoscopy;  Laterality: N/A;  ok to hold eliquis per Dr. Valadez, see telephone encounter 11/13/2020-MS  covid test 12/18 [place]    KIDNEY TRANSPLANT      TRANSESOPHAGEAL ECHOCARDIOGRAPHY N/A 8/26/2019    Procedure: ECHOCARDIOGRAM, TRANSESOPHAGEAL;  Surgeon: Dolores Diagnostic Provider;  Location: [facility] EP LAB;  Service: Cardiology;  Laterality: N/A;    TRANSESOPHAGEAL ECHOCARDIOGRAPHY N/A 6/11/2024    Procedure: ECHOCARDIOGRAM, TRANSESOPHAGEAL;  Surgeon: Grayson Lin III, MD;  Location: [facility] EP LAB;  Service: Cardiology;  Laterality: N/A;    TREATMENT OF CARDIAC ARRHYTHMIA N/A 8/26/2019    Procedure: CARDIOVERSION;  Surgeon: Bronson Bowden MD;  Location: [facility] EP LAB;  Service: Cardiology;   Laterality: N/A;  AF, FELICIANO, DCCV, MAC, GP, 3 PREP       Social History     Socioeconomic History    Marital status:    Occupational History     Employer: [employer]   Tobacco Use    Smoking status: Former     Current packs/day: 0.00     Average packs/day: 0.5 packs/day for 32.0 years (16.0 ttl pk-yrs)     Types: Cigarettes     Start date: 1984     Quit date: 2016     Years since quittin.5    Smokeless tobacco: Never    Tobacco comments:     Pt reports that he quit in , but started up again in . pt reports he is currently working on quitting again   Substance and Sexual Activity    Alcohol use: Yes     Comment: Pt reports occasional beers on Sundays. Pt reports drinking daily prior to ESRD diagnosis.    Drug use: No    Sexual activity: Yes     Partners: Female   Social History Narrative     for 14 years     for 29 years    2 children ages 35, 36     Social Determinants of Health     Financial Resource Strain: Low Risk  (5/3/2024)    Overall Financial Resource Strain (CARDIA)     Difficulty of Paying Living Expenses: Not hard at all   Food Insecurity: No Food Insecurity (5/3/2024)    Hunger Vital Sign     Worried About Running Out of Food in the Last Year: Never true     Ran Out of Food in the Last Year: Never true   Transportation Needs: No Transportation Needs (5/3/2024)    PRAPARE - Transportation     Lack of Transportation (Medical): No     Lack of Transportation (Non-Medical): No   Physical Activity: Inactive (5/3/2024)    Exercise Vital Sign     Days of Exercise per Week: 0 days     Minutes of Exercise per Session: 0 min   Stress: No Stress Concern Present (5/3/2024)    [employer] [place] [employer] - Occupational Stress Questionnaire     Feeling of Stress : Not at all   Housing Stability: Low Risk  (5/3/2024)    Housing Stability Vital Sign     Unable to Pay for Housing in the Last Year: No     Homeless in the Last Year: No       Family  History   Problem Relation Name Age of Onset    Diabetes Mother      Hypertension Mother      Heart disease Mother          CAD with PCI    Heart disease Father      Cancer Brother 2         one sister with breast cancer    Hypertension Brother 2         one sister with HTN and borderline DM    Stroke Neg Hx      Kidney disease Neg Hx         Physical Exam:      Vitals:    06/15/24 0836   BP: (!) 173/79   Pulse: 70   Resp: 16   Temp: 98.3 °F (36.8 °C)     Physical Exam  Vitals reviewed.   Constitutional:       General: He is not in acute distress.     Appearance: He is well-developed. He is not diaphoretic.   HENT:      Head: Normocephalic and atraumatic.      Mouth/Throat:      Pharynx: Oropharynx is clear.   Eyes:      Extraocular Movements: Extraocular movements intact.   Cardiovascular:      Rate and Rhythm: Normal rate and regular rhythm.      Pulses: Normal pulses.      Heart sounds: No murmur heard.  Pulmonary:      Effort: Pulmonary effort is normal. No respiratory distress.      Breath sounds: No wheezing or rales.   Chest:      Chest wall: No tenderness.   Abdominal:      Palpations: Abdomen is soft.      Tenderness: There is no abdominal tenderness. There is no guarding.   Musculoskeletal:         General: Normal range of motion.      Cervical back: Normal range of motion.      Right lower leg: Edema present.      Left lower leg: Edema present.      Comments: Pitting edema to knees bilaterally   Skin:     General: Skin is warm and dry.      Capillary Refill: Capillary refill takes less than 2 seconds.   Neurological:      Mental Status: He is alert and oriented to person, place, and time.   Psychiatric:         Mood and Affect: Mood normal.         Behavior: Behavior normal.     Legs are equal bilaterally.  No tenderness.      Procedures  Laboratory Studies:  Labs Reviewed   B-TYPE NATRIURETIC PEPTIDE   CBC W/ AUTO DIFFERENTIAL   COMPREHENSIVE METABOLIC PANEL   TROPONIN I   ISTAT CHEM8       Chart  reviewed.  Recent charts reviewed.    Imaging Results    None         Medications Given:  Medications - No data to display    Discussed with:  Hospital medicine    MDM:    68 y.o. male with past medical history as noted coming in with shortness of breath, leg swelling in the setting of recent cardiac ablation.  Concern for fluid overload despite compliance with Lasix, ACS remains possible low significantly less likely given the lack of chest pain.  Vague cough with no fevers, pneumonia remains possible but significantly less likely as well.    Workup undertaken with EKG, labs, chest x-ray.  BNP is up, troponin is up but in the setting of recent ablation is likely to be expected, will need to be trended.  Will give full-dose aspirin as he is only taken 81 mg this morning.    My exam legs are equal bilaterally, there is no tenderness or redness, he is on Eliquis, not consistent with PE DVT at this time.    Will give 40 of IV Lasix to start diuresis.  Given his recent ablation, elevated BNP, shortness of breath, leg swelling will admit to Hospital Medicine for diuresis, troponin trending and continued monitoring and evaluation.    Decision was made to hospitalize the patient.  Acute illness with threat to bodily function.  Discussed with hospital medicine.            Medical Decision Making  Amount and/or Complexity of Data Reviewed  Labs: ordered.  Radiology: ordered.    Risk  OTC drugs.  Prescription drug management.         Diagnostic Impression:    1. Shortness of breath               Patient and/or family understands the plan and is in agreement, verbalized understanding, questions answered       Ike Subramanian MD  06/16/24 0928

## 2024-06-16 VITALS
OXYGEN SATURATION: 99 % | TEMPERATURE: 98 F | WEIGHT: 185 LBS | HEIGHT: 73 IN | HEART RATE: 62 BPM | DIASTOLIC BLOOD PRESSURE: 81 MMHG | BODY MASS INDEX: 24.52 KG/M2 | SYSTOLIC BLOOD PRESSURE: 179 MMHG | RESPIRATION RATE: 17 BRPM

## 2024-06-16 PROBLEM — I50.20 HFREF (HEART FAILURE WITH REDUCED EJECTION FRACTION): Status: ACTIVE | Noted: 2024-06-16

## 2024-06-16 PROBLEM — R06.02 SHORTNESS OF BREATH: Status: ACTIVE | Noted: 2024-06-16

## 2024-06-16 LAB
ALBUMIN SERPL BCP-MCNC: 3 G/DL (ref 3.5–5.2)
ALP SERPL-CCNC: 45 U/L (ref 55–135)
ALT SERPL W/O P-5'-P-CCNC: <5 U/L (ref 10–44)
ANION GAP SERPL CALC-SCNC: 10 MMOL/L (ref 8–16)
AST SERPL-CCNC: 14 U/L (ref 10–40)
BASOPHILS # BLD AUTO: 0.01 K/UL (ref 0–0.2)
BASOPHILS NFR BLD: 0.2 % (ref 0–1.9)
BILIRUB SERPL-MCNC: 0.6 MG/DL (ref 0.1–1)
BSA FOR ECHO PROCEDURE: 2.08 M2
BUN SERPL-MCNC: 32 MG/DL (ref 8–23)
CALCIUM SERPL-MCNC: 8.6 MG/DL (ref 8.7–10.5)
CHLORIDE SERPL-SCNC: 100 MMOL/L (ref 95–110)
CO2 SERPL-SCNC: 23 MMOL/L (ref 23–29)
CREAT SERPL-MCNC: 1.8 MG/DL (ref 0.5–1.4)
CV ECHO LV RWT: 0.34 CM
DIFFERENTIAL METHOD BLD: ABNORMAL
ECHO LV POSTERIOR WALL: 0.97 CM (ref 0.6–1.1)
EJECTION FRACTION: 58 %
EOSINOPHIL # BLD AUTO: 0 K/UL (ref 0–0.5)
EOSINOPHIL NFR BLD: 0.3 % (ref 0–8)
ERYTHROCYTE [DISTWIDTH] IN BLOOD BY AUTOMATED COUNT: 15.2 % (ref 11.5–14.5)
EST. GFR  (NO RACE VARIABLE): 41 ML/MIN/1.73 M^2
FRACTIONAL SHORTENING: 27 % (ref 28–44)
GLUCOSE SERPL-MCNC: 120 MG/DL (ref 70–110)
HCT VFR BLD AUTO: 29.4 % (ref 40–54)
HGB BLD-MCNC: 7.9 G/DL (ref 14–18)
IMM GRANULOCYTES # BLD AUTO: 0.26 K/UL (ref 0–0.04)
IMM GRANULOCYTES NFR BLD AUTO: 4.1 % (ref 0–0.5)
INTERVENTRICULAR SEPTUM: 1.06 CM (ref 0.6–1.1)
LEFT ATRIUM SIZE: 4.81 CM
LEFT INTERNAL DIMENSION IN SYSTOLE: 4.12 CM (ref 2.1–4)
LEFT VENTRICLE DIASTOLIC VOLUME INDEX: 75.5 ML/M2
LEFT VENTRICLE DIASTOLIC VOLUME: 157.03 ML
LEFT VENTRICLE MASS INDEX: 109 G/M2
LEFT VENTRICLE SYSTOLIC VOLUME INDEX: 36.2 ML/M2
LEFT VENTRICLE SYSTOLIC VOLUME: 75.3 ML
LEFT VENTRICULAR INTERNAL DIMENSION IN DIASTOLE: 5.65 CM (ref 3.5–6)
LEFT VENTRICULAR MASS: 227.42 G
LYMPHOCYTES # BLD AUTO: 0.5 K/UL (ref 1–4.8)
LYMPHOCYTES NFR BLD: 7.4 % (ref 18–48)
MAGNESIUM SERPL-MCNC: 1.9 MG/DL (ref 1.6–2.6)
MCH RBC QN AUTO: 23.2 PG (ref 27–31)
MCHC RBC AUTO-ENTMCNC: 26.9 G/DL (ref 32–36)
MCV RBC AUTO: 87 FL (ref 82–98)
MONOCYTES # BLD AUTO: 1.2 K/UL (ref 0.3–1)
MONOCYTES NFR BLD: 18.3 % (ref 4–15)
NEUTROPHILS # BLD AUTO: 4.4 K/UL (ref 1.8–7.7)
NEUTROPHILS NFR BLD: 69.7 % (ref 38–73)
NRBC BLD-RTO: 0 /100 WBC
OHS QRS DURATION: 96 MS
OHS QTC CALCULATION: 414 MS
PHOSPHATE SERPL-MCNC: 3.3 MG/DL (ref 2.7–4.5)
PISA TR MAX VEL: 4.14 M/S
PLATELET # BLD AUTO: 103 K/UL (ref 150–450)
PMV BLD AUTO: ABNORMAL FL (ref 9.2–12.9)
POCT GLUCOSE: 124 MG/DL (ref 70–110)
POCT GLUCOSE: 174 MG/DL (ref 70–110)
POTASSIUM SERPL-SCNC: 3.9 MMOL/L (ref 3.5–5.1)
PROT SERPL-MCNC: 5.5 G/DL (ref 6–8.4)
RA PRESSURE ESTIMATED: 3 MMHG
RBC # BLD AUTO: 3.4 M/UL (ref 4.6–6.2)
RETICS/RBC NFR AUTO: 2.4 % (ref 0.4–2)
RV TB RVSP: 7 MMHG
SODIUM SERPL-SCNC: 133 MMOL/L (ref 136–145)
TACROLIMUS BLD-MCNC: 11.2 NG/ML (ref 5–15)
TR MAX PG: 69 MMHG
TV REST PULMONARY ARTERY PRESSURE: 72 MMHG
WBC # BLD AUTO: 6.35 K/UL (ref 3.9–12.7)
Z-SCORE OF LEFT VENTRICULAR DIMENSION IN END DIASTOLE: -1.2
Z-SCORE OF LEFT VENTRICULAR DIMENSION IN END SYSTOLE: 0.44

## 2024-06-16 PROCEDURE — 25000003 PHARM REV CODE 250

## 2024-06-16 PROCEDURE — G0378 HOSPITAL OBSERVATION PER HR: HCPCS

## 2024-06-16 PROCEDURE — 80197 ASSAY OF TACROLIMUS: CPT

## 2024-06-16 PROCEDURE — 25000242 PHARM REV CODE 250 ALT 637 W/ HCPCS

## 2024-06-16 PROCEDURE — 63600175 PHARM REV CODE 636 W HCPCS

## 2024-06-16 PROCEDURE — 83735 ASSAY OF MAGNESIUM: CPT

## 2024-06-16 PROCEDURE — 85025 COMPLETE CBC W/AUTO DIFF WBC: CPT

## 2024-06-16 PROCEDURE — 80053 COMPREHEN METABOLIC PANEL: CPT

## 2024-06-16 PROCEDURE — 25000003 PHARM REV CODE 250: Performed by: STUDENT IN AN ORGANIZED HEALTH CARE EDUCATION/TRAINING PROGRAM

## 2024-06-16 PROCEDURE — 96372 THER/PROPH/DIAG INJ SC/IM: CPT

## 2024-06-16 PROCEDURE — 99214 OFFICE O/P EST MOD 30 MIN: CPT | Mod: ,,, | Performed by: INTERNAL MEDICINE

## 2024-06-16 PROCEDURE — 84100 ASSAY OF PHOSPHORUS: CPT

## 2024-06-16 PROCEDURE — 96376 TX/PRO/DX INJ SAME DRUG ADON: CPT

## 2024-06-16 PROCEDURE — 36415 COLL VENOUS BLD VENIPUNCTURE: CPT

## 2024-06-16 PROCEDURE — 85045 AUTOMATED RETICULOCYTE COUNT: CPT | Performed by: INTERNAL MEDICINE

## 2024-06-16 PROCEDURE — 36415 COLL VENOUS BLD VENIPUNCTURE: CPT | Performed by: INTERNAL MEDICINE

## 2024-06-16 RX ORDER — ISOSORBIDE MONONITRATE 30 MG/1
30 TABLET, EXTENDED RELEASE ORAL DAILY
Status: DISCONTINUED | OUTPATIENT
Start: 2024-06-16 | End: 2024-06-16 | Stop reason: HOSPADM

## 2024-06-16 RX ORDER — FLECAINIDE ACETATE 50 MG/1
50 TABLET ORAL EVERY 12 HOURS
COMMUNITY
End: 2024-06-19

## 2024-06-16 RX ORDER — MYCOPHENOLATE MOFETIL 250 MG/1
500 CAPSULE ORAL 2 TIMES DAILY
Status: DISCONTINUED | OUTPATIENT
Start: 2024-06-16 | End: 2024-06-16 | Stop reason: HOSPADM

## 2024-06-16 RX ORDER — NALOXONE HCL 0.4 MG/ML
0.02 VIAL (ML) INJECTION
Status: DISCONTINUED | OUTPATIENT
Start: 2024-06-16 | End: 2024-06-16 | Stop reason: HOSPADM

## 2024-06-16 RX ORDER — TACROLIMUS 0.5 MG/1
0.5 CAPSULE ORAL EVERY 12 HOURS
Qty: 60 CAPSULE | Refills: 11 | Status: SHIPPED | OUTPATIENT
Start: 2024-06-16 | End: 2024-06-20

## 2024-06-16 RX ORDER — GLUCAGON 1 MG
1 KIT INJECTION
Status: DISCONTINUED | OUTPATIENT
Start: 2024-06-16 | End: 2024-06-16 | Stop reason: HOSPADM

## 2024-06-16 RX ORDER — SPIRONOLACTONE 25 MG/1
25 TABLET ORAL DAILY
Qty: 30 TABLET | Refills: 11 | Status: SHIPPED | OUTPATIENT
Start: 2024-06-16 | End: 2024-06-16

## 2024-06-16 RX ORDER — SPIRONOLACTONE 25 MG/1
25 TABLET ORAL DAILY
Qty: 30 TABLET | Refills: 11 | Status: SHIPPED | OUTPATIENT
Start: 2024-06-16 | End: 2025-06-16

## 2024-06-16 RX ORDER — IBUPROFEN 200 MG
16 TABLET ORAL
Status: DISCONTINUED | OUTPATIENT
Start: 2024-06-16 | End: 2024-06-16 | Stop reason: HOSPADM

## 2024-06-16 RX ORDER — IBUPROFEN 200 MG
24 TABLET ORAL
Status: DISCONTINUED | OUTPATIENT
Start: 2024-06-16 | End: 2024-06-16 | Stop reason: HOSPADM

## 2024-06-16 RX ORDER — FUROSEMIDE 20 MG/1
20 TABLET ORAL 2 TIMES DAILY
Qty: 60 TABLET | Refills: 11 | Status: SHIPPED | OUTPATIENT
Start: 2024-06-16 | End: 2024-06-19

## 2024-06-16 RX ORDER — SODIUM CHLORIDE 0.9 % (FLUSH) 0.9 %
10 SYRINGE (ML) INJECTION EVERY 12 HOURS PRN
Status: DISCONTINUED | OUTPATIENT
Start: 2024-06-16 | End: 2024-06-16 | Stop reason: HOSPADM

## 2024-06-16 RX ORDER — MYCOPHENOLATE MOFETIL 250 MG/1
500 CAPSULE ORAL 2 TIMES DAILY
Qty: 120 CAPSULE | Refills: 11 | Status: SHIPPED | OUTPATIENT
Start: 2024-06-16 | End: 2025-06-16

## 2024-06-16 RX ADMIN — ASPIRIN 81 MG CHEWABLE TABLET 81 MG: 81 TABLET CHEWABLE at 08:06

## 2024-06-16 RX ADMIN — ISOSORBIDE MONONITRATE 30 MG: 30 TABLET, EXTENDED RELEASE ORAL at 08:06

## 2024-06-16 RX ADMIN — EMPAGLIFLOZIN 10 MG: 10 TABLET, FILM COATED ORAL at 08:06

## 2024-06-16 RX ADMIN — PREDNISONE 5 MG: 5 TABLET ORAL at 08:06

## 2024-06-16 RX ADMIN — Medication 400 MG: at 08:06

## 2024-06-16 RX ADMIN — MYCOPHENOLATE MOFETIL 1000 MG: 250 CAPSULE ORAL at 08:06

## 2024-06-16 RX ADMIN — INSULIN ASPART 2 UNITS: 100 INJECTION, SOLUTION INTRAVENOUS; SUBCUTANEOUS at 08:06

## 2024-06-16 RX ADMIN — TACROLIMUS 0.5 MG: 0.5 CAPSULE ORAL at 08:06

## 2024-06-16 RX ADMIN — ATORVASTATIN CALCIUM 40 MG: 40 TABLET, FILM COATED ORAL at 08:06

## 2024-06-16 RX ADMIN — CARVEDILOL 25 MG: 25 TABLET, FILM COATED ORAL at 08:06

## 2024-06-16 RX ADMIN — INSULIN GLARGINE 10 UNITS: 100 INJECTION, SOLUTION SUBCUTANEOUS at 08:06

## 2024-06-16 RX ADMIN — INSULIN ASPART 2 UNITS: 100 INJECTION, SOLUTION INTRAVENOUS; SUBCUTANEOUS at 12:06

## 2024-06-16 RX ADMIN — DRONEDARONE 400 MG: 400 TABLET, FILM COATED ORAL at 08:06

## 2024-06-16 RX ADMIN — APIXABAN 5 MG: 5 TABLET, FILM COATED ORAL at 08:06

## 2024-06-16 RX ADMIN — VALSARTAN 320 MG: 160 TABLET, FILM COATED ORAL at 08:06

## 2024-06-16 RX ADMIN — PANTOPRAZOLE SODIUM 40 MG: 40 TABLET, DELAYED RELEASE ORAL at 08:06

## 2024-06-16 RX ADMIN — GABAPENTIN 300 MG: 300 CAPSULE ORAL at 08:06

## 2024-06-16 RX ADMIN — FUROSEMIDE 40 MG: 10 INJECTION, SOLUTION INTRAVENOUS at 08:06

## 2024-06-16 NOTE — ASSESSMENT & PLAN NOTE
- Hx of diastolic (HFpEF) heart failure that is Acute on chronic now.  - Consider diuretic   - Consider cardiology consult

## 2024-06-16 NOTE — DISCHARGE SUMMARY
[facility] Medicine  Discharge Summary      Patient Name: Ravi Zamorano  MRN: [id number]  MARISOL: [id number]  Patient Class: OP- Observation  Admission Date: 6/15/2024  Hospital Length of Stay: 0 days  Discharge Date and Time:  06/16/2024 12:01 PM  Attending Physician: Fan Aldridge MD   Discharging Provider: Quin Smith MD  Primary Care Provider: Mima Mack MD  Hospital Medicine Team: [facility] 1 Quin Smith MD  Primary Care Team: [facility] 1    HPI:   Mr Zamorano is a 68 y.o. male with a PMH of ESRD s/p kidney transplant 2016, AF on eliquis s/p ablation 6/11, T2DM on insulin, HCV s/p harvoni, HTN, HLD, CHF (LV diastolic dysfunction, pEF), who presents with worsening SOB and LE edema for the last few days and cough productive of whitish sputum last night so he presented to the ED. Patient was taken off spironolactone in March and taken off HCTZ at the beginning of April. He called his PCP and restarted both medications but currently is not on any of them. He does not follow a strict low salt diet, but denies any recent high salt meals. Patient denies palpitations, chest pain, cough, fever, chills, abdominal pain, N/V, urinary symptoms, light headedness, headache, weakness. His pressure at home have been running high. He is compliant with home medications.  No tobacco use. Occasional alcohol consumption.     In the ED his BP was 173/79, R 70, RR 16 SpO2 98% on RA. Labs significant for HB 7.4, Na 133, K 4.1, BUN 31, Cr 1.9, BNP 3203, Troponin 0.934. CXR unremarkable for pulmonary edema. Admitted to hospital medicine for management of heart failure exacerbation.    * No surgery found *      Hospital Course:   Mr Zamorano is a 68 y.o. male with a PMH of ESRD s/p kidney transplant 2016, AF on eliquis s/p ablation 6/11, T2DM on insulin, HCV s/p harvoni, HTN, HLD, CHF (LV diastolic dysfunction, pEF), who was admitted for acute on chronic heart failure with volume overload.   Underwent 1 day of IV diuresis with improvement in symptoms.  Now appears euvolemic on exam.  Lasix dose was changed on discharge to 40 mg in the morning and 20 mg in the evening and aldactone was restarted. Patient stable for discharge with outpatient follow up with cardiology and KTM.     Goals of Care Treatment Preferences:  Code Status: Full Code      Consults:   Consults (From admission, onward)          Status Ordering Provider     Inpatient consult to Kidney/Pancreas Transplant Medicine  Once        Provider:  (Not yet assigned)    MATTHEW Arrieta            Final Active Diagnoses:    Diagnosis Date Noted POA    PRINCIPAL PROBLEM:  Acute on chronic heart failure [I50.9] 10/23/2014 Yes    HFrEF (heart failure with reduced ejection fraction) [I50.20] 2024 Yes    Shortness of breath [R06.02] 2024 Yes    Status post -donor kidney transplantation [Z94.0] 2024 Not Applicable    Symptomatic anemia [D64.9] 2024 Yes    Paroxysmal A-fib [I48.0] 2023 Yes    PAD (peripheral artery disease) [I73.9] 2023 Yes    Elevated troponin [R79.89] 07/10/2022 Yes    Type 2 diabetes mellitus [E11.9] 2017 Yes    Stage 3 chronic kidney disease [N18.30] 2016 Yes    S/P cadaveric kidney transplant 2016. ESRD secondary to HTN/DMII [Z94.0] 2016 Not Applicable    Hyperlipidemia [E78.5] 2014 Yes    Essential (primary) hypertension [I10] 2014 Yes      Problems Resolved During this Admission:       Discharged Condition: stable    Disposition:   Home    Follow Up:  PCP, kidney transplant Medicine, Cardiology    Patient Instructions:   No discharge procedures on file.    Significant Diagnostic Studies: Labs: All labs within the past 24 hours have been reviewed    Pending Diagnostic Studies:       None           Medications:  Reconciled Home Medications:      Medication List        START taking these medications      spironolactone 25 MG tablet  Commonly known  "as: ALDACTONE  Take 1 tablet (25 mg total) by mouth once daily.            CHANGE how you take these medications      furosemide 20 MG tablet  Commonly known as: LASIX  Take 1 tablet (20 mg total) by mouth 2 (two) times daily. Take 2 tablets (total 40 mg) by mouth in the morning and one tablet (total 20 mg) by mouth in the evening  What changed:   when to take this  additional instructions     insulin aspart U-100 100 unit/mL (3 mL) Inpn pen  Commonly known as: NovoLOG  Inject 16 units w/ breakfast, 10 units at lunch and dinner scale 180-230+2, 231-280+4, 281-330+6, 331-380+8, >380+10.  *Max daily 66 units.*  What changed:   how much to take  how to take this  when to take this     mycophenolate 250 mg Cap  Commonly known as: CELLCEPT  Take 2 capsules (500 mg total) by mouth 2 (two) times daily.  What changed:   how much to take  additional instructions     tacrolimus 0.5 MG Cap  Commonly known as: PROGRAF  Take 1 capsule (0.5 mg total) by mouth every 12 (twelve) hours. Hold on until you see transplant doctor with the next prograf level  What changed: additional instructions            CONTINUE taking these medications      apixaban 5 mg Tab  Commonly known as: ELIQUIS  Take 1 tablet (5 mg total) by mouth 2 (two) times daily.     aspirin 81 MG Chew  Take 81 mg by mouth once daily.     atorvastatin 40 MG tablet  Commonly known as: LIPITOR  Take 1 tablet (40 mg total) by mouth once daily.     * BD ULTRA-FINE LO PEN NEEDLE 32 gauge x 5/32" Ndle  Generic drug: pen needle, diabetic  USE TO ADMINISTER INSULIN 4 TIMES DAILY.     * BD ULTRA-FINE LO PEN NEEDLE 32 gauge x 5/32" Ndle  Generic drug: pen needle, diabetic  use four times a day     blood-glucose sensor Kayla  Gin 3, use as directed, change every 14 days , e 11.65     carvediloL 25 MG tablet  Commonly known as: COREG  Take 1 tablet (25 mg total) by mouth 2 (two) times daily.     flecainide 50 MG Tab  Commonly known as: TAMBOCOR  Take 50 mg by mouth every 12 " (twelve) hours.     gabapentin 300 MG capsule  Commonly known as: NEURONTIN  Take 1 capsule by mouth in the morning, 1 capsule midday, and 3 capsules at night.     JARDIANCE 10 mg tablet  Generic drug: empagliflozin  Take 1 tablet (10 mg total) by mouth once daily.     LANTUS SOLOSTAR U-100 INSULIN 100 unit/mL (3 mL) Inpn pen  Generic drug: insulin glargine U-100 (Lantus)  Inject 20 Units into the skin every morning.     magnesium oxide 400 mg (241.3 mg magnesium) tablet  Commonly known as: MAG-OX  Take 1 tablet (400 mg total) by mouth 2 (two) times daily.     meclizine 25 mg tablet  Commonly known as: ANTIVERT  Take 1 tablet (25 mg total) by mouth 3 (three) times daily as needed for Dizziness.     MULTAQ 400 mg Tab  Generic drug: dronedarone  Take 1 tablet (400 mg total) by mouth 2 (two) times daily with meals.     pantoprazole 40 MG tablet  Commonly known as: PROTONIX  Take 1 tablet (40 mg total) by mouth once daily.     predniSONE 5 MG tablet  Commonly known as: DELTASONE  Take 1 tablet (5 mg total) by mouth once daily.     TRUE METRIX GLUCOSE METER Misc  Generic drug: blood-glucose meter  Use to check blood glucose 1 times daily, to use with insurance preferred meter     TRUE METRIX GLUCOSE TEST STRIP Strp  Generic drug: blood sugar diagnostic  Use to check blood glucose 1 times daily, to use with insurance preferred meter     TRUEPLUS LANCETS 30 gauge Misc  Generic drug: lancets  Use to check blood glucose 1 times daily, to use with insurance preferred meter     valsartan 320 MG tablet  Commonly known as: DIOVAN  Take 1 tablet (320 mg total) by mouth once daily.           * This list has 2 medication(s) that are the same as other medications prescribed for you. Read the directions carefully, and ask your doctor or other care provider to review them with you.                  Indwelling Lines/Drains at time of discharge:   Lines/Drains/Airways       None                   Time spent on the discharge of patient:  40 minutes         Quin Smith MD  Department of Hospital Medicine  [facility]

## 2024-06-16 NOTE — SUBJECTIVE & OBJECTIVE
Subjective:   History of Present Illness:     Mr Zamorano is a 68 y.o.  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  He has CKD stage 3 - baseline creatinine is between 1.7-2. He takes mycophenolate mofetil, prednisone and tacrolimus for maintenance immunosuppression.     His PMH is significant for AF on eliquis s/p ablation 6/11, T2DM on insulin, HCV s/p harvoni, HTN, HLD, CHF (LV diastolic dysfunction, pEF), who presents with worsening SOB and LE edema for the last few days and cough productive of whitish sputum last night so he presented to the ED. Patient was taken off spironolactone in March and taken off HCTZ at the beginning of April. Labs significant for HB 7.4, Na 133, K 4.1, BUN 31, Cr 1.9, BNP 3203, Troponin 0.934. CXR unremarkable for pulmonary edema. Admitted to hospital medicine for management of heart failure exacerbation.    Cr at 1.8 today. His prograf was held due to high prograf level. Will check prograf level tomorrow           Scheduled Meds:   apixaban  5 mg Oral BID    aspirin  81 mg Oral Daily    atorvastatin  40 mg Oral Daily    carvediloL  25 mg Oral BID    dronedarone  400 mg Oral BID WM    empagliflozin  10 mg Oral Daily    furosemide (LASIX) injection  40 mg Intravenous BID    gabapentin  300 mg Oral BID    insulin aspart U-100  2 Units Subcutaneous TIDWM    insulin glargine U-100  10 Units Subcutaneous Daily    isosorbide mononitrate  30 mg Oral Daily    magnesium oxide  400 mg Oral BID    mycophenolate  500 mg Oral BID    pantoprazole  40 mg Oral Daily    predniSONE  5 mg Oral Daily    valsartan  320 mg Oral Daily     Continuous Infusions:  PRN Meds:  Current Facility-Administered Medications:     acetaminophen, 650 mg, Oral, Q6H PRN    aluminum-magnesium hydroxide-simethicone, 30 mL, Oral, QID PRN    dextrose 10%, 12.5 g, Intravenous, PRN    dextrose 10%, 25 g, Intravenous, PRN    glucagon (human recombinant), 1 mg, Intramuscular,  "PRN    glucose, 16 g, Oral, PRN    glucose, 24 g, Oral, PRN    insulin aspart U-100, 0-5 Units, Subcutaneous, QID (AC + HS) PRN    melatonin, 6 mg, Oral, Nightly PRN    naloxone, 0.02 mg, Intravenous, PRN    sodium chloride 0.9%, 10 mL, Intravenous, Q12H PRN    Intake/Output - Last 3 Shifts         06/14 0700  06/15 0659 06/15 0700  06/16 0659 06/16 0700  06/17 0659    Urine (mL/kg/hr)  1800     Total Output  1800     Net  -1800                     Review of Systems     Constitutional:  Positive for  fatigue.   Respiratory:  Positive for cough and shortness of breath.    Cardiovascular:  Positive for leg swelling. Negative for chest pain and palpitations.   Gastrointestinal:  Negative for abdominal pain, nausea and vomiting.   Genitourinary:  Negative for frequency and urgency.   Musculoskeletal:  Negative for arthralgias and myalgias.   Neurological:  Positive for weakness. Negative for light-headedness and numbness.   Psychiatric/Behavioral:  Negative for agitation, confusion and decreased concentration.    Objective:     Vital Signs (Most Recent):  Temp: 98.3 °F (36.8 °C) (06/16/24 0816)  Pulse: (!) 55 (06/16/24 0816)  Resp: 18 (06/16/24 0816)  BP: (!) 165/85 (06/16/24 0832)  SpO2: 97 % (06/16/24 0816) Vital Signs (24h Range):  Temp:  [97.5 °F (36.4 °C)-98.3 °F (36.8 °C)] 98.3 °F (36.8 °C)  Pulse:  [55-70] 55  Resp:  [16-18] 18  SpO2:  [95 %-98 %] 97 %  BP: (149-189)/(70-90) 165/85     Weight: 84.1 kg (185 lb 6.5 oz)  Height: 6' 1" (185.4 cm)  Body mass index is 24.46 kg/m².     Physical Exam  Cardiovascular:      Rate and Rhythm: Normal rate. Rhythm irregular.      Pulses: Normal pulses.      Heart sounds: Normal heart sounds.   Pulmonary:      Breath sounds: Rales  present.   Abdominal:      General: Bowel sounds are normal. There is distension.      Palpations: Abdomen is soft.   Musculoskeletal:      Right lower leg: Edema present.      Left lower leg: Edema present.      Comments: Edema up to " dot    Psychiatric:         Mood and Affect: Mood normal.         Behavior: Behavior normal.       Laboratory:  Labs within the past 24 hours have been reviewed.

## 2024-06-16 NOTE — PLAN OF CARE
Problem: Adult Inpatient Plan of Care  Goal: Plan of Care Review  Outcome: Met  Goal: Patient-Specific Goal (Individualized)  Outcome: Met  Goal: Absence of Hospital-Acquired Illness or Injury  Outcome: Met  Goal: Optimal Comfort and Wellbeing  Outcome: Met  Goal: Readiness for Transition of Care  Outcome: Met     Problem: Diabetes Comorbidity  Goal: Blood Glucose Level Within Targeted Range  Outcome: Met     Problem: Wound  Goal: Optimal Coping  Outcome: Met  Goal: Optimal Functional Ability  Outcome: Met  Goal: Absence of Infection Signs and Symptoms  Outcome: Met  Goal: Improved Oral Intake  Outcome: Met  Goal: Optimal Pain Control and Function  Outcome: Met  Goal: Skin Health and Integrity  Outcome: Met  Goal: Optimal Wound Healing  Outcome: Met

## 2024-06-16 NOTE — CONSULTS
[facility] - Med Surg  Kidney Transplant  Progress Note      Reason for Follow-up: Reassessment of Kidney Transplant - 11/5/2016  (#1) recipient and management of immunosuppression.         Subjective:   History of Present Illness:     Mr Zamorano is a 68 y.o.  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  He has CKD stage 3 - baseline creatinine is between 1.7-2. He takes mycophenolate mofetil, prednisone and tacrolimus for maintenance immunosuppression.     His PMH is significant for AF on eliquis s/p ablation 6/11, T2DM on insulin, HCV s/p harvoni, HTN, HLD, CHF (LV diastolic dysfunction, pEF), who presents with worsening SOB and LE edema for the last few days and cough productive of whitish sputum last night so he presented to the ED. Patient was taken off spironolactone in March and taken off HCTZ at the beginning of April. Labs significant for HB 7.4, Na 133, K 4.1, BUN 31, Cr 1.9, BNP 3203, Troponin 0.934. CXR unremarkable for pulmonary edema. Admitted to hospital medicine for management of heart failure exacerbation.    Cr at 1.8 today. His prograf was held due to high prograf level. Will check prograf level tomorrow           Scheduled Meds:   apixaban  5 mg Oral BID    aspirin  81 mg Oral Daily    atorvastatin  40 mg Oral Daily    carvediloL  25 mg Oral BID    dronedarone  400 mg Oral BID WM    empagliflozin  10 mg Oral Daily    furosemide (LASIX) injection  40 mg Intravenous BID    gabapentin  300 mg Oral BID    insulin aspart U-100  2 Units Subcutaneous TIDWM    insulin glargine U-100  10 Units Subcutaneous Daily    isosorbide mononitrate  30 mg Oral Daily    magnesium oxide  400 mg Oral BID    mycophenolate  500 mg Oral BID    pantoprazole  40 mg Oral Daily    predniSONE  5 mg Oral Daily    valsartan  320 mg Oral Daily     Continuous Infusions:  PRN Meds:  Current Facility-Administered Medications:     acetaminophen, 650 mg, Oral, Q6H PRN     "aluminum-magnesium hydroxide-simethicone, 30 mL, Oral, QID PRN    dextrose 10%, 12.5 g, Intravenous, PRN    dextrose 10%, 25 g, Intravenous, PRN    glucagon (human recombinant), 1 mg, Intramuscular, PRN    glucose, 16 g, Oral, PRN    glucose, 24 g, Oral, PRN    insulin aspart U-100, 0-5 Units, Subcutaneous, QID (AC + HS) PRN    melatonin, 6 mg, Oral, Nightly PRN    naloxone, 0.02 mg, Intravenous, PRN    sodium chloride 0.9%, 10 mL, Intravenous, Q12H PRN    Intake/Output - Last 3 Shifts         06/14 0700  06/15 0659 06/15 0700 06/16 0659 06/16 0700  06/17 0659    Urine (mL/kg/hr)  1800     Total Output  1800     Net  -1800                     Review of Systems     Constitutional:  Positive for  fatigue.   Respiratory:  Positive for cough and shortness of breath.    Cardiovascular:  Positive for leg swelling. Negative for chest pain and palpitations.   Gastrointestinal:  Negative for abdominal pain, nausea and vomiting.   Genitourinary:  Negative for frequency and urgency.   Musculoskeletal:  Negative for arthralgias and myalgias.   Neurological:  Positive for weakness. Negative for light-headedness and numbness.   Psychiatric/Behavioral:  Negative for agitation, confusion and decreased concentration.    Objective:     Vital Signs (Most Recent):  Temp: 98.3 °F (36.8 °C) (06/16/24 0816)  Pulse: (!) 55 (06/16/24 0816)  Resp: 18 (06/16/24 0816)  BP: (!) 165/85 (06/16/24 0832)  SpO2: 97 % (06/16/24 0816) Vital Signs (24h Range):  Temp:  [97.5 °F (36.4 °C)-98.3 °F (36.8 °C)] 98.3 °F (36.8 °C)  Pulse:  [55-70] 55  Resp:  [16-18] 18  SpO2:  [95 %-98 %] 97 %  BP: (149-189)/(70-90) 165/85     Weight: 84.1 kg (185 lb 6.5 oz)  Height: 6' 1" (185.4 cm)  Body mass index is 24.46 kg/m².     Physical Exam  Cardiovascular:      Rate and Rhythm: Normal rate. Rhythm irregular.      Pulses: Normal pulses.      Heart sounds: Normal heart sounds.   Pulmonary:      Breath sounds: Rales  present.   Abdominal:      General: Bowel sounds " are normal. There is distension.      Palpations: Abdomen is soft.   Musculoskeletal:      Right lower leg: Edema present.      Left lower leg: Edema present.      Comments: Edema up to shins    Psychiatric:         Mood and Affect: Mood normal.         Behavior: Behavior normal.       Laboratory:  Labs within the past 24 hours have been reviewed.    Assessment/Plan:     * Acute on chronic heart failure  - Hx of diastolic (HFpEF) heart failure that is Acute on chronic now.  - Consider diuretic   - Consider cardiology consult        Stage 3 chronic kidney disease  - DBD kidney transplant on 11/5/16.   - CKD stage 3 - baseline creatinine is between 1.7-2.0  - Home IS med : mycophenolate mofetil, prednisone and tacrolimus       Kidney function at baseline. Consider diuretic for acute on chronic heart failure. Strict I/O to avoid over diuresis. Low Na diet. Check prograf level daily to avoid nephrotoxicity.   Tac on hold .    mg BID   Prednisone 5 mg daily        Paroxysmal A-fib  - on eliquis, ASA, betablocker and dronedrone          Radha Kennedy MD  Kidney Transplant  [facility] - Med Surg

## 2024-06-16 NOTE — ED NOTES
Telemetry Verification   Patient placed on Telemetry Box  Verified with War Room  Box # [id number]    Marcus   Rate 65   Rhythm NSR

## 2024-06-16 NOTE — HOSPITAL COURSE
Mr Zamorano is a 68 y.o. male with a PMH of ESRD s/p kidney transplant 2016, AF on eliquis s/p ablation 6/11, T2DM on insulin, HCV s/p harvoni, HTN, HLD, CHF (LV diastolic dysfunction, pEF), who presents with worsening SOB and LE edema for the last few days, was given 40 mg IV Lasix. Improved leg swelling and SOB. Lasix dose was changed on discharge to 40 mg in the morning and 20 mg in the evening and aldactone was restarted. Patient stable for discharge with outpatient follow up with cardiology and KTM.

## 2024-06-16 NOTE — ASSESSMENT & PLAN NOTE
- DBD kidney transplant on 11/5/16.   - CKD stage 3 - baseline creatinine is between 1.7-2.0  - Home IS med : mycophenolate mofetil, prednisone and tacrolimus       Kidney function at baseline. Consider diuretic for acute on chronic heart failure. Strict I/O to avoid over diuresis. Low Na diet. Check prograf level daily to avoid nephrotoxicity.   Tac on hold .    mg BID   Prednisone 5 mg daily

## 2024-06-16 NOTE — NURSING
Nurses Note -- 4 Eyes      6/16/2024   1:55 AM      Skin assessed during: Admit      [x] No Altered Skin Integrity Present    []Prevention Measures Documented      [] Yes- Altered Skin Integrity Present or Discovered   [] LDA Added if Not in Epic (Describe Wound)   [] New Altered Skin Integrity was Present on Admit and Documented in LDA   [] Wound Image Taken    Wound Care Consulted? No    Attending Nurse:  Shannan Potts RN/Staff Member:   Evangelina MONREAL

## 2024-06-16 NOTE — HPI
Mr Zamorano is a 68 y.o.  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  He has CKD stage 3 - baseline creatinine is between 1.7-2. He takes mycophenolate mofetil, prednisone and tacrolimus for maintenance immunosuppression.     His PMH is significant for AF on eliquis s/p ablation 6/11, T2DM on insulin, HCV s/p harvoni, HTN, HLD, CHF (LV diastolic dysfunction, pEF), who presents with worsening SOB and LE edema for the last few days and cough productive of whitish sputum last night so he presented to the ED. Patient was taken off spironolactone in March and taken off HCTZ at the beginning of April. Labs significant for HB 7.4, Na 133, K 4.1, BUN 31, Cr 1.9, BNP 3203, Troponin 0.934. CXR unremarkable for pulmonary edema. Admitted to hospital medicine for management of heart failure exacerbation.    Cr at 1.8 today. His prograf was held due to high prograf level. Will check prograf level tomorrow

## 2024-06-16 NOTE — PHARMACY MED REC
"  Admission Medication History     The home medication history was taken by Talya Ta.    You may go to "Admission" then "Reconcile Home Medications" tabs to review and/or act upon these items.     The home medication list has been updated by the Pharmacy department.   Please read ALL comments highlighted in yellow.   Please address this information as you see fit.    Feel free to contact us if you have any questions or require assistance.    Medications listed below were obtained from: Patient/family and Analytic software- [facility]   Medication Sig    apixaban (ELIQUIS) 5 mg Tab Take 1 tablet (5 mg total) by mouth 2 (two) times daily.      aspirin 81 MG Chew Take 81 mg by mouth once daily.      atorvastatin (LIPITOR) 40 MG tablet   Take 1 tablet (40 mg total) by mouth once daily.    carvediloL (COREG) 25 MG tablet   Take 1 tablet (25 mg total) by mouth 2 (two) times daily.    dronedarone (MULTAQ) 400 mg Tab Take 1 tablet (400 mg total) by mouth 2 (two) times daily with meals.      empagliflozin (JARDIANCE) 10 mg tablet   Take 1 tablet (10 mg total) by mouth once daily.    flecainide (TAMBOCOR) 50 MG Tab   Take 50 mg by mouth every 12 (twelve) hours.    furosemide (LASIX) 20 MG tablet Take 1 tablet (20 mg total) by mouth 2 (two) times a day.      gabapentin (NEURONTIN) 300 MG capsule Take 1 capsule by mouth in the morning, 1 capsule midday, and 3 capsules at night.      insulin aspart U-100 (NOVOLOG) 100 unit/mL (3 mL) InPn pen Inject 15 Units into the skin 3 (three) times daily with meals. Inject 16 units w/ breakfast, 10 units at lunch and dinner scale 180-230+2, 231-280+4, 281-330+6, 331-380+8, >380+10.  *Max daily 66 units.*)      insulin glargine U-100, Lantus, (LANTUS SOLOSTAR U-100 INSULIN) 100 unit/mL (3 mL) InPn pen   Inject 20 Units into the skin every morning.    magnesium oxide (MAG-OX) 400 mg (241.3 mg magnesium) tablet   Take 1 tablet (400 mg total) by mouth 2 (two) times daily.    " meclizine (ANTIVERT) 25 mg tablet Take 1 tablet (25 mg total) by mouth 3 (three) times daily as needed for Dizziness.      mycophenolate (CELLCEPT) 250 mg Cap   Take 4 capsules (1,000 mg total) by mouth 2 (two) times daily.     pantoprazole (PROTONIX) 40 MG tablet   Take 1 tablet (40 mg total) by mouth once daily.    predniSONE (DELTASONE) 5 MG tablet   Take 1 tablet (5 mg total) by mouth once daily.    tacrolimus (PROGRAF) 0.5 MG Cap Take 1 capsule (0.5 mg total) by mouth every 12 (twelve) hours.      valsartan (DIOVAN) 320 MG tablet   Take 1 tablet (320 mg total) by mouth once daily.     Talya Burfect  [id number]               .

## 2024-06-17 ENCOUNTER — LAB VISIT (OUTPATIENT)
Dept: LAB | Facility: HOSPITAL | Age: 69
End: 2024-06-17
Payer: MEDICARE

## 2024-06-17 DIAGNOSIS — Z94.0 KIDNEY REPLACED BY TRANSPLANT: Primary | ICD-10-CM

## 2024-06-17 DIAGNOSIS — Z94.0 KIDNEY REPLACED BY TRANSPLANT: ICD-10-CM

## 2024-06-17 LAB — TACROLIMUS BLD-MCNC: 8.2 NG/ML (ref 5–15)

## 2024-06-17 PROCEDURE — 36415 COLL VENOUS BLD VENIPUNCTURE: CPT | Performed by: INTERNAL MEDICINE

## 2024-06-17 PROCEDURE — 80197 ASSAY OF TACROLIMUS: CPT | Performed by: INTERNAL MEDICINE

## 2024-06-18 ENCOUNTER — PATIENT OUTREACH (OUTPATIENT)
Dept: ADMINISTRATIVE | Facility: CLINIC | Age: 69
End: 2024-06-18
Payer: MEDICARE

## 2024-06-18 NOTE — PROGRESS NOTES
C3 nurse attempted to contact Ravi Zamorano  for a TCC post hospital discharge follow up call. No answer. Left voicemail with callback information. The patient does not have a scheduled HOSFU appointment. Message sent to PCP staff for assistance with scheduling visit with patient.

## 2024-06-19 ENCOUNTER — OFFICE VISIT (OUTPATIENT)
Dept: CARDIOLOGY | Facility: CLINIC | Age: 69
End: 2024-06-19
Payer: MEDICARE

## 2024-06-19 VITALS
SYSTOLIC BLOOD PRESSURE: 161 MMHG | BODY MASS INDEX: 25.88 KG/M2 | HEIGHT: 73 IN | WEIGHT: 195.31 LBS | DIASTOLIC BLOOD PRESSURE: 71 MMHG | OXYGEN SATURATION: 100 % | HEART RATE: 52 BPM

## 2024-06-19 DIAGNOSIS — Z79.4 TYPE 2 DIABETES MELLITUS WITH STAGE 3A CHRONIC KIDNEY DISEASE, WITH LONG-TERM CURRENT USE OF INSULIN: ICD-10-CM

## 2024-06-19 DIAGNOSIS — N18.31 TYPE 2 DIABETES MELLITUS WITH STAGE 3A CHRONIC KIDNEY DISEASE, WITH LONG-TERM CURRENT USE OF INSULIN: ICD-10-CM

## 2024-06-19 DIAGNOSIS — I50.32 CHRONIC DIASTOLIC CONGESTIVE HEART FAILURE: ICD-10-CM

## 2024-06-19 DIAGNOSIS — N18.31 STAGE 3A CHRONIC KIDNEY DISEASE: ICD-10-CM

## 2024-06-19 DIAGNOSIS — Z94.0 S/P KIDNEY TRANSPLANT: ICD-10-CM

## 2024-06-19 DIAGNOSIS — I48.19 PERSISTENT ATRIAL FIBRILLATION: ICD-10-CM

## 2024-06-19 DIAGNOSIS — E11.22 TYPE 2 DIABETES MELLITUS WITH STAGE 3A CHRONIC KIDNEY DISEASE, WITH LONG-TERM CURRENT USE OF INSULIN: ICD-10-CM

## 2024-06-19 DIAGNOSIS — I10 ESSENTIAL (PRIMARY) HYPERTENSION: ICD-10-CM

## 2024-06-19 DIAGNOSIS — I73.9 PAD (PERIPHERAL ARTERY DISEASE): ICD-10-CM

## 2024-06-19 DIAGNOSIS — E78.2 MIXED HYPERLIPIDEMIA: ICD-10-CM

## 2024-06-19 DIAGNOSIS — I50.33 ACUTE ON CHRONIC DIASTOLIC HEART FAILURE: Primary | ICD-10-CM

## 2024-06-19 PROBLEM — R79.89 ELEVATED TROPONIN: Status: RESOLVED | Noted: 2022-07-10 | Resolved: 2024-06-19

## 2024-06-19 PROBLEM — I50.20 HFREF (HEART FAILURE WITH REDUCED EJECTION FRACTION): Status: RESOLVED | Noted: 2024-06-16 | Resolved: 2024-06-19

## 2024-06-19 PROBLEM — Z79.899 ENCOUNTER FOR MONITORING FLECAINIDE THERAPY: Status: RESOLVED | Noted: 2019-10-03 | Resolved: 2024-06-19

## 2024-06-19 PROBLEM — Z51.81 ENCOUNTER FOR MONITORING FLECAINIDE THERAPY: Status: RESOLVED | Noted: 2019-10-03 | Resolved: 2024-06-19

## 2024-06-19 PROCEDURE — 99999 PR PBB SHADOW E&M-EST. PATIENT-LVL V: CPT | Mod: PBBFAC,,, | Performed by: PHYSICIAN ASSISTANT

## 2024-06-19 RX ORDER — FUROSEMIDE 40 MG/1
40 TABLET ORAL 2 TIMES DAILY
Qty: 60 TABLET | Refills: 11 | Status: SHIPPED | OUTPATIENT
Start: 2024-06-19 | End: 2025-06-19

## 2024-06-19 RX ORDER — HYDRALAZINE HYDROCHLORIDE 25 MG/1
25 TABLET, FILM COATED ORAL EVERY 12 HOURS
Qty: 60 TABLET | Refills: 11 | Status: SHIPPED | OUTPATIENT
Start: 2024-06-19 | End: 2025-06-19

## 2024-06-19 NOTE — PROGRESS NOTES
General Cardiology Clinic Note  Reason for Visit: Heart failure   Last Clinic Visit: 5/21/2024    HPI:   Ravi Zamorano is a 68 y.o. male who presents for heart failure.     Problems:  Atrial fibrillation s/p CTI/PVI/PWI 6/11/2024  HFpEF  Hypertension  Hyperlipidemia   PAD  IDDM  ESRD s/p kidney transplant 2016   Anemia    Interval HPI  Patient underwent RFA 6/11/2024. He was started on Multaq. He was then admitted 6/15-6/16 for ADHF. He was diuresed with IV Lasix. He was discharged on higher dose of Lasix and restarted on Spironolactone.     Patient presents for follow up. States he is not feeling any better. He still has leg swelling. He has been very sedentary since ablation, so unclear if he is still having dyspnea. He denies orthopnea and PND. His weight at home is up a couple pounds since hospital discharge. He reports being on a low sodium diet.     5/21/2024  Patient was admitted 5/2-5/3 for ADHF. Presented to the ED with DOSS, orthopnea, edema. In ED, /96. CXR with left sided pleural effusion. BNP >4,900. Troponin 0.049. Hgb 7.9 (at baseline for April). Required brief IV diuresis with improvement. BP was uncontrolled during the hospital possibly contributing to volume overload.  Changes were made to his medications including up titration of valsartan and Coreg, HCTZ was discontinued.  TTE was obtained.  Was in atrial fibrillation during study, which showed EF 50-55%. Unable to assess diastolic function due to atrial fibrillation.      Patient presents for follow up. He still has mild edema. He reports DOSS with mild exertion, such as walking short distances or taking out the trash. He has to walk very slowly. Symptoms are significantly improved from when he presented to the ED, but not normal for him. He denies chest pain, orthopnea, PND, lightheadedness, syncope. He reports palpitations. He is not doing daily weights or checking BP consistently at home. He has two BP cuffs but they give  completely different readings. He is on a low sodium diet. He was not discharged on Lasix after the hospitalization, so he was just prescribed it about a week ago. He is also supposed to be taking Coreg 50 mg bid, but is on 25 mg bid.     ROS:      Review of Systems   Constitutional: Negative for diaphoresis, malaise/fatigue, weight gain and weight loss.   HENT:  Negative for nosebleeds.    Eyes:  Negative for vision loss in left eye, vision loss in right eye and visual disturbance.   Cardiovascular:  Positive for dyspnea on exertion and leg swelling. Negative for chest pain, claudication, irregular heartbeat, near-syncope, orthopnea, palpitations, paroxysmal nocturnal dyspnea and syncope.   Respiratory:  Positive for shortness of breath. Negative for cough, sleep disturbances due to breathing, snoring and wheezing.    Hematologic/Lymphatic: Negative for bleeding problem. Does not bruise/bleed easily.   Skin:  Negative for poor wound healing and rash.   Musculoskeletal:  Negative for muscle cramps and myalgias.   Gastrointestinal:  Negative for bloating, abdominal pain, diarrhea, heartburn, melena, nausea and vomiting.   Genitourinary:  Negative for hematuria and nocturia.   Neurological:  Negative for brief paralysis, dizziness, headaches, light-headedness, numbness and weakness.   Psychiatric/Behavioral:  Negative for depression.    Allergic/Immunologic: Negative for hives.       PMH:     Past Medical History:   Diagnosis Date    Anticoagulant long-term use     Anxiety 07/29/2014    Arthritis     atrial fibrillation     Bilateral diabetic retinopathy 2017    CKD (chronic kidney disease) stage 2, GFR 60-89 ml/min 12/28/2016    CKD (chronic kidney disease) stage 4, GFR 15-29 ml/min 07/29/2014    Colon polyps 2014    Depression - situational 07/29/2014    Diabetes mellitus     Diabetes type 2 since 2000 07/29/2014    Diabetic neuropathy 07/29/2014    Encounter for blood transfusion     History of cardioversion  10/03/2019    History of hepatitis C, s/p successful Rx w/ SVR24 - 9/2017 07/29/2014    Genotype 1a, treatment naive 10/2014 liver biopsy - grade 1 / stage 1 Completed Harvoni w/ SVR    Hyperlipidemia 07/29/2014    Hypertension     Neuropathy     Organ transplant candidate 07/29/2014    Pancreatitis     S/P cadaveric kidney transplant 11/5/2016. ESRD secondary to HTN/DMII 11/05/2016    Stage 3a chronic kidney disease 12/28/2016    Type 2 diabetes mellitus with stage 3a chronic kidney disease, with long-term current use of insulin 07/29/2014     Past Surgical History:   Procedure Laterality Date    ABLATION OF ARRHYTHMOGENIC FOCUS FOR ATRIAL FIBRILLATION N/A 6/11/2024    Procedure: Ablation atrial fibrillation;  Surgeon: Bronson Bowden MD;  Location: [facility] EP LAB;  Service: Cardiology;  Laterality: N/A;  AF, FELICIANO (cx if SR), PVI, RFA, Carto, Gen, GP, 3 Prep    ABLATION, ATRIAL FLUTTER, TYPICAL  6/11/2024    Procedure: Ablation, Atrial Flutter, Typical;  Surgeon: Bronson Bowden MD;  Location: [facility] EP LAB;  Service: Cardiology;;    APPENDECTOMY      CARDIOVERSION  6/11/2024    Procedure: Cardioversion;  Surgeon: Bronson Bowden MD;  Location: [facility] EP LAB;  Service: Cardiology;;    CATARACT EXTRACTION W/  INTRAOCULAR LENS IMPLANT Right 3/13/2024    Procedure: EXTRACTION, CATARACT, WITH IOL INSERTION;  Surgeon: Jennifer Palacio MD;  Location: [facility] OR;  Service: Ophthalmology;  Laterality: Right;    CATARACT EXTRACTION W/  INTRAOCULAR LENS IMPLANT Left 4/10/2024    Procedure: EXTRACTION, CATARACT, WITH IOL INSERTION;  Surgeon: Jennifer Palacio MD;  Location: [facility] OR;  Service: Ophthalmology;  Laterality: Left;    COLONOSCOPY N/A 12/21/2020    Procedure: COLONOSCOPY;  Surgeon: Tico Bell MD;  Location: [facility] ENDO ([address] [place] [address]);  Service: Endoscopy;  Laterality: N/A;  ok to hold eliquis per Dr. Valadez, see telephone encounter 11/13/2020-[facility]  covid test 12/18 [place]    KIDNEY TRANSPLANT      TRANSESOPHAGEAL  ECHOCARDIOGRAPHY N/A 8/26/2019    Procedure: ECHOCARDIOGRAM, TRANSESOPHAGEAL;  Surgeon: Dolores Diagnostic Provider;  Location: [facility] EP LAB;  Service: Cardiology;  Laterality: N/A;    TRANSESOPHAGEAL ECHOCARDIOGRAPHY N/A 6/11/2024    Procedure: ECHOCARDIOGRAM, TRANSESOPHAGEAL;  Surgeon: Grayson Lin III, MD;  Location: [facility] EP LAB;  Service: Cardiology;  Laterality: N/A;    TREATMENT OF CARDIAC ARRHYTHMIA N/A 8/26/2019    Procedure: CARDIOVERSION;  Surgeon: Bronson Bowden MD;  Location: [facility] EP LAB;  Service: Cardiology;  Laterality: N/A;  AF, FELICIANO, DCCV, MAC, GP, 3 PREP     Allergies:     Review of patient's allergies indicates:   Allergen Reactions    Nifedipine Other (See Comments)     Gingival hyperplasia     Medications:     Current Outpatient Medications on File Prior to Visit   Medication Sig Dispense Refill    apixaban (ELIQUIS) 5 mg Tab Take 1 tablet (5 mg total) by mouth 2 (two) times daily. 180 tablet 3    aspirin 81 MG Chew Take 81 mg by mouth once daily.      atorvastatin (LIPITOR) 40 MG tablet Take 1 tablet (40 mg total) by mouth once daily. 90 tablet 3    blood sugar diagnostic Strp Use to check blood glucose 1 times daily, to use with insurance preferred meter 100 each 11    blood-glucose meter Misc Use to check blood glucose 1 times daily, to use with insurance preferred meter 1 each 0    blood-glucose sensor Kayla Gin 3, use as directed, change every 14 days , e 11.65 2 each 11    carvediloL (COREG) 25 MG tablet Take 1 tablet (25 mg total) by mouth 2 (two) times daily. 180 tablet 3    dronedarone (MULTAQ) 400 mg Tab Take 1 tablet (400 mg total) by mouth 2 (two) times daily with meals. 60 tablet 11    empagliflozin (JARDIANCE) 10 mg tablet Take 1 tablet (10 mg total) by mouth once daily. 30 tablet 11    flecainide (TAMBOCOR) 50 MG Tab Take 50 mg by mouth every 12 (twelve) hours.      furosemide (LASIX) 20 MG tablet Take 1 tablet (20 mg total) by mouth 2 (two) times daily. Take 2 tablets (total  "40 mg) by mouth in the morning and one tablet (total 20 mg) by mouth in the evening 60 tablet 11    gabapentin (NEURONTIN) 300 MG capsule Take 1 capsule by mouth in the morning, 1 capsule midday, and 3 capsules at night. 450 capsule 3    insulin aspart U-100 (NOVOLOG) 100 unit/mL (3 mL) InPn pen Inject 16 units w/ breakfast, 10 units at lunch and dinner scale 180-230+2, 231-280+4, 281-330+6, 331-380+8, >380+10.  *Max daily 66 units.* (Patient taking differently: Inject 15 Units into the skin 3 (three) times daily with meals. Inject 16 units w/ breakfast, 10 units at lunch and dinner scale 180-230+2, 231-280+4, 281-330+6, 331-380+8, >380+10.  *Max daily 66 units.*) 30 mL 6    insulin glargine U-100, Lantus, (LANTUS SOLOSTAR U-100 INSULIN) 100 unit/mL (3 mL) InPn pen Inject 20 Units into the skin every morning. 15 mL 6    lancets 30 gauge Misc Use to check blood glucose 1 times daily, to use with insurance preferred meter 100 each 3    magnesium oxide (MAG-OX) 400 mg (241.3 mg magnesium) tablet Take 1 tablet (400 mg total) by mouth 2 (two) times daily. 60 tablet 6    meclizine (ANTIVERT) 25 mg tablet Take 1 tablet (25 mg total) by mouth 3 (three) times daily as needed for Dizziness. 21 tablet 3    mycophenolate (CELLCEPT) 250 mg Cap Take 2 capsules (500 mg total) by mouth 2 (two) times daily. 120 capsule 11    pantoprazole (PROTONIX) 40 MG tablet Take 1 tablet (40 mg total) by mouth once daily. 30 tablet 0    pen needle, diabetic (BD ULTRA-FINE LO PEN NEEDLE) 32 gauge x 5/32" Ndle USE TO ADMINISTER INSULIN 4 TIMES DAILY. 400 each 3    pen needle, diabetic (BD ULTRA-FINE OL PEN NEEDLE) 32 gauge x 5/32" Ndle use four times a day 100 each 11    predniSONE (DELTASONE) 5 MG tablet Take 1 tablet (5 mg total) by mouth once daily. 30 tablet 11    spironolactone (ALDACTONE) 25 MG tablet Take 1 tablet (25 mg total) by mouth once daily. 30 tablet 11    tacrolimus (PROGRAF) 0.5 MG Cap Take 1 capsule (0.5 mg total) by mouth " every 12 (twelve) hours. Hold on until you see transplant doctor with the next prograf level 60 capsule 11    valsartan (DIOVAN) 320 MG tablet Take 1 tablet (320 mg total) by mouth once daily. 60 tablet 0    [DISCONTINUED] insulin lispro (HUMALOG KWIKPEN INSULIN) 100 unit/mL pen Inject 18 units w/ breakfast, 16 units w/ lunch and dinner plus scale 150-200 +2, 201-250 +4, 251-300 +6, 301-350 +8. 30 mL 4     Current Facility-Administered Medications on File Prior to Visit   Medication Dose Route Frequency Provider Last Rate Last Admin    balanced salt irrigation intra-ocular solution 1 drop  1 drop Right Eye On Call Procedure Jennifer Palacio MD        phenylephrine HCL 10% ophthalmic solution 1 drop  1 drop Right Eye PRN Jennifer Palacio MD        proparacaine 0.5 % ophthalmic solution 1 drop  1 drop Right Eye Daily PRN Jennifer Palacio MD        sodium chloride 0.9% flush 10 mL  10 mL Intravenous PRN Jennifer Palacio MD        TETRAcaine HCl (PF) 0.5 % Drop 1 drop  1 drop Right Eye PRN Jennifer Palacio MD         Social History:     Social History     Tobacco Use    Smoking status: Former     Current packs/day: 0.00     Average packs/day: 0.5 packs/day for 32.0 years (16.0 ttl pk-yrs)     Types: Cigarettes     Start date: 1984     Quit date: 2016     Years since quittin.5    Smokeless tobacco: Never    Tobacco comments:     Pt reports that he quit in , but started up again in . pt reports he is currently working on quitting again   Substance Use Topics    Alcohol use: Yes     Comment: Pt reports occasional beers on Sundays. Pt reports drinking daily prior to ESRD diagnosis.     Family History:     Family History   Problem Relation Name Age of Onset    Diabetes Mother      Hypertension Mother      Heart disease Mother          CAD with PCI    Heart disease Father      Cancer Brother 2         one sister with breast cancer    Hypertension Brother 2         one sister with HTN and  "borderline DM    Stroke Neg Hx      Kidney disease Neg Hx       Physical Exam:   BP (!) 161/71   Pulse (!) 52   Ht 6' 1" (1.854 m)   Wt 88.6 kg (195 lb 5.2 oz)   SpO2 100%   BMI 25.77 kg/m²        Physical Exam  Vitals and nursing note reviewed.   Constitutional:       General: He is not in acute distress.     Appearance: Normal appearance.   HENT:      Head: Normocephalic and atraumatic.      Nose: Nose normal.   Eyes:      General: No scleral icterus.     Extraocular Movements: Extraocular movements intact.      Conjunctiva/sclera: Conjunctivae normal.   Neck:      Thyroid: No thyromegaly.      Vascular: JVD present. No carotid bruit.   Cardiovascular:      Rate and Rhythm: Normal rate and regular rhythm.      Pulses: Normal pulses.      Heart sounds: Normal heart sounds. No murmur heard.     No friction rub. No gallop.   Pulmonary:      Effort: Pulmonary effort is normal.      Breath sounds: Normal breath sounds. No wheezing, rhonchi or rales.   Chest:      Chest wall: No tenderness.   Abdominal:      General: Bowel sounds are normal. There is no distension.      Palpations: Abdomen is soft.      Tenderness: There is no abdominal tenderness.   Musculoskeletal:      Cervical back: Neck supple.      Right lower le+ Pitting Edema (to ankles bilaterally) present.      Left lower le+ Pitting Edema present.   Skin:     General: Skin is warm and dry.      Coloration: Skin is not pale.      Findings: No erythema or rash.      Nails: There is no clubbing.   Neurological:      Mental Status: He is alert and oriented to person, place, and time.   Psychiatric:         Mood and Affect: Mood and affect normal.         Behavior: Behavior normal.          Labs:     Lab Results   Component Value Date     (L) 2024    K 3.9 2024     2024    CO2 23 2024    BUN 32 (H) 2024    CREATININE 1.8 (H) 2024    ANIONGAP 10 2024     Lab Results   Component Value Date    HGBA1C " 6.8 (H) 05/02/2024     Lab Results   Component Value Date    BNP 3,203 (H) 06/15/2024    BNP 3,313 (H) 06/04/2024    BNP 3,646 (H) 05/31/2024    Lab Results   Component Value Date    WBC 6.35 06/16/2024    HGB 7.9 (L) 06/16/2024    HCT 29.4 (L) 06/16/2024    HCT 30 (L) 06/15/2024     (L) 06/16/2024    GRAN 4.4 06/16/2024    GRAN 69.7 06/16/2024     Lab Results   Component Value Date    CHOL 131 06/07/2023    HDL 31 (L) 06/07/2023    LDLCALC 61.4 (L) 06/07/2023    TRIG 193 (H) 06/07/2023          Imaging:   Echocardiograms:   TTE 6/16/2024    Left Ventricle: The left ventricle is normal in size. Ventricular mass is normal. Mild septal thickening. Regional wall motion abnormalities present. See diagram for wall motion findings. Septal motion is abnormal. There is normal systolic function with a visually estimated ejection fraction of 55 - 60%. Ejection fraction by visual approximation is 58%.    Right Ventricle: Normal right ventricular cavity size. Wall thickness is normal. Systolic function is normal.    Tricuspid Valve: There is mild regurgitation.    Pulmonary Artery: There is severe pulmonary hypertension. The estimated pulmonary artery systolic pressure is 72 mmHg.    IVC/SVC: Normal venous pressure at 3 mmHg.    Pericardium: There is a small posterior effusion at base and none to trivial otherwise.    TTE 5/3/2024    Irregularly irregular rhythm was present during the study    Left Ventricle: The left ventricle is normal in size. Ventricular mass is normal. Normal wall thickness. Normal wall motion. There is low normal systolic function with a visually estimated ejection fraction of 50 - 55%. Unable to assess diastolic function due to atrial fibrillation. Singnificant beat to beat variabilty due  to AF.    Left Ventricle: Unable to assess diastolic function due to atrial fibrillation. Normal left ventricular filling pressure.    Right Ventricle: Normal right ventricular cavity size. Wall thickness is  normal. Systolic function is normal.    Left Atrium: Left atrium is severely dilated.    Right Atrium: Right atrium is mildly dilated.    Aortic Valve: The aortic valve is a bicuspid valve. There is mild aortic valve sclerosis. There is mild annular calcification present. There is mild aortic regurgitation.    Mitral Valve: There is mild regurgitation.    Aorta: Aortic root is mildly dilated measuring 3.69 cm. Ascending aorta is normal measuring 3.44 cm.    Pulmonary Artery: The estimated pulmonary artery systolic pressure is 42 mmHg.    IVC/SVC: Intermediate venous pressure at 8 mmHg.    Pericardium: There is a small posterior effusion. No indication of cardiac tamponade. Left pleural effusion.    TTE 7/11/2022  The left ventricle is normal in size with eccentric hypertrophy and normal systolic function. The estimated ejection fraction is 65%.  Normal right ventricular size with normal right ventricular systolic function.  Left ventricular diastolic dysfunction.  Biatrial enlargement.  Mild aortic regurgitation.  PA systolic pressure is at elast 39 mmHg. The IVC is not visualized.    Stress Tests:   Nuclear stress test 5/15/2023    Normal myocardial perfusion scan. There is no evidence of myocardial ischemia or infarction.    The visually estimated ejection fraction is normal at rest and normal during stress.    There is normal wall motion at rest and post stress.    LV cavity size is normal at rest and normal at stress.    The ECG portion of the study is abnormal but not diagnostic due to resting ST-T abnormalities.    The patient reported no chest pain during the stress test.    There were no arrhythmias during stress.    There are no prior studies for comparison.    Caths:   None    Other:  Lower extremity arterial ultrasound 2/1/2024    Right resting WENDI 0.65, is suggestive of moderate right lower extremity arterial disease.    There is a short-segment occlusion at the mid SFA, followed by high-grade stenosis  in the distal segment.    The right anterior tibial artery is occluded in the mid segment.    Left resting WENDI 0.70, is suggestive of moderate left lower extremity arterial disease.    The left SFA is occluded in the proximal/mid segment, with distal reconstitution.    The left anterior tibial artery is occluded in the mid segment.    48 Hour Holter Monitor 4/17/2023  Baseline rhythm was sinus bradycardia with first degree AV block and an average heart rate of 55 bpm.  There were no reported symptoms.  There were very rare PVCs with an overall PVC burden of 0.1%. There were frequent PACs with an overall PAC burden of 1.4%.  There were asymptomatic pauses of up to 2.1 seconds in duration during sleep with no evidence of high-degree AV block.  There were no atrial or ventricular arrhythmias.    ABIs 12/9/2022  Moderately decreased right WENDI, 0.69.    Mildly decreased left WENDI, 0.71.    Moderately dampened PVR waveforms bilaterally.      Assessment:     1. Acute on chronic diastolic heart failure    2. Chronic diastolic congestive heart failure    3. Essential (primary) hypertension    4. Mixed hyperlipidemia    5. Persistent atrial fibrillation    6. PAD (peripheral artery disease)    7. S/P cadaveric kidney transplant 11/5/2016. ESRD secondary to HTN/DMII    8. Stage 3a chronic kidney disease    9. Type 2 diabetes mellitus with stage 3a chronic kidney disease, with long-term current use of insulin        Plan:     Acute on chronic diastolic CHF  Volume overloaded on exam.   Increase Lasix to 80 mg bid for 2 days and then decrease to 40 mg bid   Continue Spironolactone 25 mg daily   Continue Jardiance 10 mg daily   Discussed the importance of a low sodium diet and daily weights  BMP and BNP in one week    Persistent Afib s/p RFA  Continue Multaq, Coreg, Eliquis   Following with EP     Hypertension  Uncontrolled  Start Hydralazine 25 mg bid. He has an allergy to CCB  Increase Lasix, as above.   Continue Valsartan 320  mg daily   Continue Coreg 25 mg bid  Continue Spironolactone 25 mg daily     Hyperlipidemia   LDL at goal. Continue Lipitor 40 mg daily     PAD  Following with Dr. Gottlieb    S/p kidney transplant  CKD Stage 3  BMP in one week. He was advised to hold his Prograf, but he started back taking it on his own. Will message his transplant nephrologist.     Type 2 DM   Well controlled.     Follow up in 3 weeks.     Signed:  Nayely Vital PA-C  Cardiology   [phone] - General

## 2024-06-19 NOTE — PROGRESS NOTES
3rd Attempt made to reach patient for TCC call. Patient unable to conduct call at this time and requested call back tomorrow.

## 2024-06-20 ENCOUNTER — TELEPHONE (OUTPATIENT)
Dept: HEMATOLOGY/ONCOLOGY | Facility: CLINIC | Age: 69
End: 2024-06-20
Payer: MEDICARE

## 2024-06-20 ENCOUNTER — LAB VISIT (OUTPATIENT)
Dept: LAB | Facility: HOSPITAL | Age: 69
End: 2024-06-20
Attending: INTERNAL MEDICINE
Payer: MEDICARE

## 2024-06-20 DIAGNOSIS — Z94.0 KIDNEY REPLACED BY TRANSPLANT: ICD-10-CM

## 2024-06-20 LAB — TACROLIMUS BLD-MCNC: 9 NG/ML (ref 5–15)

## 2024-06-20 PROCEDURE — 36415 COLL VENOUS BLD VENIPUNCTURE: CPT | Performed by: INTERNAL MEDICINE

## 2024-06-20 PROCEDURE — 80197 ASSAY OF TACROLIMUS: CPT | Performed by: INTERNAL MEDICINE

## 2024-06-20 RX ORDER — TACROLIMUS 0.5 MG/1
0.5 CAPSULE ORAL EVERY MORNING
Qty: 30 CAPSULE | Refills: 11 | Status: SHIPPED | OUTPATIENT
Start: 2024-06-20

## 2024-06-20 NOTE — PROGRESS NOTES
3rd Attempt made to reach patient for TCC call. Left voicemail please call [phone] leave first name, last name, and  for Guille.  I will return your call.

## 2024-06-20 NOTE — TELEPHONE ENCOUNTER
Received written order from Dr. Kennedy.  Spoke to patient and instructed to decrease Prograf to 0.5 mg once a day and repeat a prograf level on Monday.  Patient verbalized understanding.      ----- Message from Radha Kennedy MD sent at 6/20/2024 11:30 AM CDT -----  High tac level: Please make sure if he is on tac 0.5 mg BID. If so, Tac to 0.5 mg daily and check tac level On Monday

## 2024-06-20 NOTE — TELEPHONE ENCOUNTER
----- Message from Alice Padilla sent at 6/20/2024 10:18 AM CDT -----  Regarding: Consult/Advisory  Contact: Ravi Zamorano  Consult/Advisory    Name Of Caller: Ravi Zamorano       Contact Preference: [phone] (Mobile    Nature of call: Patient calling to get clarity on instructions about taking meds before the procedure bone marrow biopsy on 6/26/2024. Requesting a call back to advise.

## 2024-06-20 NOTE — PROGRESS NOTES
High tac level: Please make sure if he is on tac 0.5 mg BID. If so, Tac to 0.5 mg daily and check tac level On Monday

## 2024-06-20 NOTE — TELEPHONE ENCOUNTER
Returned call to patient. Patient confirmed that he will need to hold Eliquis for 2-3 days prior and Aspirin 5 days prior to upcoming bone marrow biopsy on 6/26/24    Patient verbalized understanding.

## 2024-06-24 ENCOUNTER — TELEPHONE (OUTPATIENT)
Dept: ENDOSCOPY | Facility: HOSPITAL | Age: 69
End: 2024-06-24
Payer: MEDICARE

## 2024-06-24 NOTE — TELEPHONE ENCOUNTER
Contacted Pt for Bone Marrow Biopsy pre-call for upcoming procedure.  Pre-call complete, Pt does not have any further questions. Arrival time:09:15 am

## 2024-06-25 ENCOUNTER — TELEPHONE (OUTPATIENT)
Dept: CARDIOLOGY | Facility: CLINIC | Age: 69
End: 2024-06-25
Payer: MEDICARE

## 2024-06-25 ENCOUNTER — OFFICE VISIT (OUTPATIENT)
Dept: CARDIOLOGY | Facility: CLINIC | Age: 69
End: 2024-06-25
Payer: MEDICARE

## 2024-06-25 ENCOUNTER — LAB VISIT (OUTPATIENT)
Dept: LAB | Facility: HOSPITAL | Age: 69
End: 2024-06-25
Payer: MEDICARE

## 2024-06-25 VITALS
WEIGHT: 181.88 LBS | SYSTOLIC BLOOD PRESSURE: 136 MMHG | DIASTOLIC BLOOD PRESSURE: 62 MMHG | BODY MASS INDEX: 24.11 KG/M2 | HEIGHT: 73 IN | HEART RATE: 49 BPM

## 2024-06-25 DIAGNOSIS — I73.9 CLAUDICATION OF BOTH LOWER EXTREMITIES: ICD-10-CM

## 2024-06-25 DIAGNOSIS — I50.32 CHRONIC DIASTOLIC CONGESTIVE HEART FAILURE: ICD-10-CM

## 2024-06-25 DIAGNOSIS — N18.31 TYPE 2 DIABETES MELLITUS WITH STAGE 3A CHRONIC KIDNEY DISEASE, WITH LONG-TERM CURRENT USE OF INSULIN: ICD-10-CM

## 2024-06-25 DIAGNOSIS — Z94.0 S/P KIDNEY TRANSPLANT: ICD-10-CM

## 2024-06-25 DIAGNOSIS — Z79.01 ANTICOAGULANT LONG-TERM USE: ICD-10-CM

## 2024-06-25 DIAGNOSIS — E78.1 PURE HYPERTRIGLYCERIDEMIA: Primary | ICD-10-CM

## 2024-06-25 DIAGNOSIS — Z79.4 TYPE 2 DIABETES MELLITUS WITH STAGE 3A CHRONIC KIDNEY DISEASE, WITH LONG-TERM CURRENT USE OF INSULIN: ICD-10-CM

## 2024-06-25 DIAGNOSIS — I73.9 PAD (PERIPHERAL ARTERY DISEASE): ICD-10-CM

## 2024-06-25 DIAGNOSIS — N17.9 AKI (ACUTE KIDNEY INJURY): Primary | ICD-10-CM

## 2024-06-25 DIAGNOSIS — I48.0 PAROXYSMAL A-FIB: ICD-10-CM

## 2024-06-25 DIAGNOSIS — E11.22 TYPE 2 DIABETES MELLITUS WITH STAGE 3A CHRONIC KIDNEY DISEASE, WITH LONG-TERM CURRENT USE OF INSULIN: ICD-10-CM

## 2024-06-25 DIAGNOSIS — I48.19 PERSISTENT ATRIAL FIBRILLATION: ICD-10-CM

## 2024-06-25 DIAGNOSIS — Z94.0 STATUS POST DECEASED-DONOR KIDNEY TRANSPLANTATION: ICD-10-CM

## 2024-06-25 LAB
ANION GAP SERPL CALC-SCNC: 11 MMOL/L (ref 8–16)
BNP SERPL-MCNC: 992 PG/ML (ref 0–99)
BUN SERPL-MCNC: 40 MG/DL (ref 8–23)
CALCIUM SERPL-MCNC: 9.5 MG/DL (ref 8.7–10.5)
CHLORIDE SERPL-SCNC: 97 MMOL/L (ref 95–110)
CO2 SERPL-SCNC: 23 MMOL/L (ref 23–29)
CREAT SERPL-MCNC: 2.7 MG/DL (ref 0.5–1.4)
EST. GFR  (NO RACE VARIABLE): 25.2 ML/MIN/1.73 M^2
GLUCOSE SERPL-MCNC: 288 MG/DL (ref 70–110)
POTASSIUM SERPL-SCNC: 5.5 MMOL/L (ref 3.5–5.1)
SODIUM SERPL-SCNC: 131 MMOL/L (ref 136–145)

## 2024-06-25 PROCEDURE — 83880 ASSAY OF NATRIURETIC PEPTIDE: CPT | Performed by: PHYSICIAN ASSISTANT

## 2024-06-25 PROCEDURE — 80048 BASIC METABOLIC PNL TOTAL CA: CPT | Performed by: PHYSICIAN ASSISTANT

## 2024-06-25 PROCEDURE — 36415 COLL VENOUS BLD VENIPUNCTURE: CPT | Performed by: PHYSICIAN ASSISTANT

## 2024-06-25 PROCEDURE — 99999 PR PBB SHADOW E&M-EST. PATIENT-LVL IV: CPT | Mod: PBBFAC,,, | Performed by: INTERNAL MEDICINE

## 2024-06-25 NOTE — PATIENT INSTRUCTIONS
Assessment/Plan:  Ravi Zamorano is a 68 y.o. male with pAF (on Eliquis), HTN, HLD, DM2, hx of ESRD previously on HD status post  donor kidney transplant, smoking, who presetns for a follow up appointment.    PAD- Mr. Zamorano presents with [place]se[place] stage 3 claudication symptoms.  Symptoms have improved with starting walking program of at least 30 minutes a day/5 days a week.  No rest pain or tissue loss.  Will hold off of starting cilostazol due to potential interaction with flecainide for QT interval prolongation.  Continue ASA 81 mg daily, Eliqus 5 mg bid, and atorvastatin 40 mg daily.  Pt to start walking program with goal of at least 30 minutes a day/5 days a week. Check BLE arterial ultrasound prior to next visit.     2. Paroxysmal Afib- He is now s/p Afib ablation on 2024.  Continue carvedilol 25 mg bid for rate control, and Eliquis 5 mg bid for anticoagulation. Follow up with EP as scheduled.     3. HLD- Continue ASA 81 mg daily, Eliqus 5 mg bid, and atorvastatin 40 mg daily.    4. Overweight- Encourage diet, exercise, and weight loss.     5. Smoking- Encourage smoking cessation.     Follow up in 6 months

## 2024-06-25 NOTE — PROGRESS NOTES
[facility]      Chief Complaint   Patient presents with    Claudication of both lower extremities       Patient ID: Ravi Zamorano is a 68 y.o. male with pAF (on Eliquis), HTN, HLD, DM2, hx of ESRD previously on HD status post  donor kidney transplant, smoking, who presetns for a follow up appointment.  Pertinent history/events are as follows:     -Pt kindly referred by Dr. Mack for evaluation of walking difficulty due to lower leg.    -At our initial clinic visit on 2023, Mr. Zamorano reportsed pain in calves and SOB after walking half a block, which improves with rest.  Symptoms started 1 year ago.  He has no rest pain or tissue loss.  Formerly smoked half a pack a day for total of 43 years.  Quit in 2016.  WENDI Study on 2023 revealed Right WENDI 0.79 with Left WNEDI 0.77.  Right Leg: Segmental pressures and PVR waveforms suggest multi-level mild to moderate peripheral arterial occlusive disease.  Left Leg: Segmental pressures and PVR waveforms suggest multi-level mild to moderate peripheral arterial occlusive disease.  Findings consistent with aorto-iliac disease. Pt with normal myocardial perfusion scan in 2023.  Plan:   PAD- Mr. Zamorano presents with [place] stage 3 claudication symptoms.  No rest pain or tissue loss.  Will hold off of starting cilostazol due to potential interaction with flecainide for QT interval prolongation.  Continue ASA 81 mg daily, Eliqus 5 mg bid, and atorvastatin 40 mg daily.  Pt to start walking program with goal of at least 30 minutes a day/5 days a week. Check BLE arterial ultrasound prior to next visit.   Paroxysmal Afib- Check 30 day event monitor to quantify Afib burden.  Continue Eliquis 5 mg bid for anticoagulation.  HLD- Continue ASA 81 mg daily, Eliqus 5 mg bid, and atorvastatin 40 mg daily.  Overweight- Encourage diet, exercise, and weight loss.   Smoking- Encourage smoking cessation.     -At follow up clinic visit on 2024,   Jaun reported significant improvement in leg pain when walking.  States he can now walk several blocks before getting the pain.  He has no rest pain or tissue loss.  Pt with irregular rhythm on exam (back in Afib).  Event monitor results are pending.  BLE Arterial Ultrasound on 2/1/2024 revealed right resting WENDI 0.65, is suggestive of moderate right lower extremity arterial disease. There is a short-segment occlusion at the mid SFA, followed by high-grade stenosis in the distal segment. The right anterior tibial artery is occluded in the mid segment.  Left resting WENDI 0.70, is suggestive of moderate left lower extremity arterial disease.  The left SFA is occluded in the proximal/mid segment, with distal reconstitution.  The left anterior tibial artery is occluded in the mid segment.  Plan:   PAD- Mr. Zamorano presents with [place] stage 3 claudication symptoms.  Symptoms have improved with starting walking program of at least 30 minutes a day/5 days a week.  No rest pain or tissue loss.  Will hold off of starting cilostazol due to potential interaction with flecainide for QT interval prolongation.  Continue ASA 81 mg daily, Eliqus 5 mg bid, and atorvastatin 40 mg daily.  Pt to start walking program with goal of at least 30 minutes a day/5 days a week. Check BLE arterial ultrasound prior to next visit.   Paroxysmal Afib- Pt with irregular rhythm on exam with rates in the 60's (back in Afib).  Event monitor results are pending.  Pt to follow up with EP (Dr. Bowden) regarding Afib ablation.  Continue carvedilol 25 mg bid for rate control, and Eliquis 5 mg bid for anticoagulation.  HLD- Continue ASA 81 mg daily, Eliqus 5 mg bid, and atorvastatin 40 mg daily.  Overweight- Encourage diet, exercise, and weight loss.   Smoking- Encourage smoking cessation.     HPI:  Mr. Zamorano reports continued improvement in leg pain when walking.  He has no rest pain or tissue loss.  He is now s/p Afib ablation on  6/11/2024.    Past Medical History:   Diagnosis Date    Anticoagulant long-term use     Anxiety 07/29/2014    Arthritis     atrial fibrillation     Bilateral diabetic retinopathy 2017    CKD (chronic kidney disease) stage 2, GFR 60-89 ml/min 12/28/2016    CKD (chronic kidney disease) stage 4, GFR 15-29 ml/min 07/29/2014    Colon polyps 2014    Depression - situational 07/29/2014    Diabetes mellitus     Diabetes type 2 since 2000 07/29/2014    Diabetic neuropathy 07/29/2014    Encounter for blood transfusion     History of cardioversion 10/03/2019    History of hepatitis C, s/p successful Rx w/ SVR24 - 9/2017 07/29/2014    Genotype 1a, treatment naive 10/2014 liver biopsy - grade 1 / stage 1 Completed Harvoni w/ SVR    Hyperlipidemia 07/29/2014    Hypertension     Neuropathy     Organ transplant candidate 07/29/2014    Pancreatitis     S/P cadaveric kidney transplant 11/5/2016. ESRD secondary to HTN/DMII 11/05/2016    Stage 3a chronic kidney disease 12/28/2016    Type 2 diabetes mellitus with stage 3a chronic kidney disease, with long-term current use of insulin 07/29/2014     Past Surgical History:   Procedure Laterality Date    ABLATION OF ARRHYTHMOGENIC FOCUS FOR ATRIAL FIBRILLATION N/A 6/11/2024    Procedure: Ablation atrial fibrillation;  Surgeon: Bronson Bowden MD;  Location: [facility] EP LAB;  Service: Cardiology;  Laterality: N/A;  AF, FELICIANO (cx if SR), PVI, RFA, Carto, Gen, GP, 3 Prep    ABLATION, ATRIAL FLUTTER, TYPICAL  6/11/2024    Procedure: Ablation, Atrial Flutter, Typical;  Surgeon: Bronson Bowden MD;  Location: [facility] EP LAB;  Service: Cardiology;;    APPENDECTOMY      CARDIOVERSION  6/11/2024    Procedure: Cardioversion;  Surgeon: Bronson Bowden MD;  Location: [facility] EP LAB;  Service: Cardiology;;    CATARACT EXTRACTION W/  INTRAOCULAR LENS IMPLANT Right 3/13/2024    Procedure: EXTRACTION, CATARACT, WITH IOL INSERTION;  Surgeon: Jennifer Palacio MD;  Location: [facility] OR;  Service: Ophthalmology;   Laterality: Right;    CATARACT EXTRACTION W/  INTRAOCULAR LENS IMPLANT Left 4/10/2024    Procedure: EXTRACTION, CATARACT, WITH IOL INSERTION;  Surgeon: Jennifer Palacio MD;  Location: [facility] OR;  Service: Ophthalmology;  Laterality: Left;    COLONOSCOPY N/A 2020    Procedure: COLONOSCOPY;  Surgeon: Tico Bell MD;  Location: [facility] ENDO (4TH FLR);  Service: Endoscopy;  Laterality: N/A;  ok to hold eliquis per Dr. Valadez, see telephone encounter 2020-MS  covid test  [place]    KIDNEY TRANSPLANT      TRANSESOPHAGEAL ECHOCARDIOGRAPHY N/A 2019    Procedure: ECHOCARDIOGRAM, TRANSESOPHAGEAL;  Surgeon: Dos Diagnostic Provider;  Location: [facility] EP LAB;  Service: Cardiology;  Laterality: N/A;    TRANSESOPHAGEAL ECHOCARDIOGRAPHY N/A 2024    Procedure: ECHOCARDIOGRAM, TRANSESOPHAGEAL;  Surgeon: Grayson Lin III, MD;  Location: [facility] EP LAB;  Service: Cardiology;  Laterality: N/A;    TREATMENT OF CARDIAC ARRHYTHMIA N/A 2019    Procedure: CARDIOVERSION;  Surgeon: Bronson Bowden MD;  Location: [facility] EP LAB;  Service: Cardiology;  Laterality: N/A;  AF, FELICIANO, DCCV, MAC, GP, 3 PREP     Social History     Socioeconomic History    Marital status:    Occupational History     Employer: [employer]   Tobacco Use    Smoking status: Former     Current packs/day: 0.00     Average packs/day: 0.5 packs/day for 32.0 years (16.0 ttl pk-yrs)     Types: Cigarettes     Start date: 1984     Quit date: 2016     Years since quittin.5    Smokeless tobacco: Never    Tobacco comments:     Pt reports that he quit in , but started up again in . pt reports he is currently working on quitting again   Substance and Sexual Activity    Alcohol use: Yes     Comment: Pt reports occasional beers on Sundays. Pt reports drinking daily prior to ESRD diagnosis.    Drug use: No    Sexual activity: Yes     Partners: Female   Social History Narrative     for 14 years     for  29 years    2 children ages 35, 36     Social Determinants of Health     Financial Resource Strain: Low Risk  (5/3/2024)    Overall Financial Resource Strain (CARDIA)     Difficulty of Paying Living Expenses: Not hard at all   Food Insecurity: No Food Insecurity (5/3/2024)    Hunger Vital Sign     Worried About Running Out of Food in the Last Year: Never true     Ran Out of Food in the Last Year: Never true   Transportation Needs: No Transportation Needs (5/3/2024)    PRAPARE - Transportation     Lack of Transportation (Medical): No     Lack of Transportation (Non-Medical): No   Physical Activity: Inactive (5/3/2024)    Exercise Vital Sign     Days of Exercise per Week: 0 days     Minutes of Exercise per Session: 0 min   Stress: No Stress Concern Present (5/3/2024)    [place] [place] [employer] - Occupational Stress Questionnaire     Feeling of Stress : Not at all   Housing Stability: Low Risk  (5/3/2024)    Housing Stability Vital Sign     Unable to Pay for Housing in the Last Year: No     Homeless in the Last Year: No     Family History   Problem Relation Name Age of Onset    Diabetes Mother      Hypertension Mother      Heart disease Mother          CAD with PCI    Heart disease Father      Cancer Brother 2         one sister with breast cancer    Hypertension Brother 2         one sister with HTN and borderline DM    Stroke Neg Hx      Kidney disease Neg Hx         Review of patient's allergies indicates:   Allergen Reactions    Nifedipine Other (See Comments)     Gingival hyperplasia       Medication List with Changes/Refills   Current Medications    APIXABAN (ELIQUIS) 5 MG TAB    Take 1 tablet (5 mg total) by mouth 2 (two) times daily.    ASPIRIN 81 MG CHEW    Take 81 mg by mouth once daily.    ATORVASTATIN (LIPITOR) 40 MG TABLET    Take 1 tablet (40 mg total) by mouth once daily.    BLOOD SUGAR DIAGNOSTIC STRP    Use to check blood glucose 1 times daily, to use with insurance preferred meter     "BLOOD-GLUCOSE METER MISC    Use to check blood glucose 1 times daily, to use with insurance preferred meter    BLOOD-GLUCOSE SENSOR SHIKHA    Gin 3, use as directed, change every 14 days , e 11.65    CARVEDILOL (COREG) 25 MG TABLET    Take 1 tablet (25 mg total) by mouth 2 (two) times daily.    DRONEDARONE (MULTAQ) 400 MG TAB    Take 1 tablet (400 mg total) by mouth 2 (two) times daily with meals.    EMPAGLIFLOZIN (JARDIANCE) 10 MG TABLET    Take 1 tablet (10 mg total) by mouth once daily.    FUROSEMIDE (LASIX) 40 MG TABLET    Take 1 tablet (40 mg total) by mouth 2 (two) times daily.    GABAPENTIN (NEURONTIN) 300 MG CAPSULE    Take 1 capsule by mouth in the morning, 1 capsule midday, and 3 capsules at night.    HYDRALAZINE (APRESOLINE) 25 MG TABLET    Take 1 tablet (25 mg total) by mouth every 12 (twelve) hours.    INSULIN ASPART U-100 (NOVOLOG) 100 UNIT/ML (3 ML) INPN PEN    Inject 16 units w/ breakfast, 10 units at lunch and dinner scale 180-230+2, 231-280+4, 281-330+6, 331-380+8, >380+10.  *Max daily 66 units.*    INSULIN GLARGINE U-100, LANTUS, (LANTUS SOLOSTAR U-100 INSULIN) 100 UNIT/ML (3 ML) INPN PEN    Inject 20 Units into the skin every morning.    LANCETS 30 GAUGE MISC    Use to check blood glucose 1 times daily, to use with insurance preferred meter    MAGNESIUM OXIDE (MAG-OX) 400 MG (241.3 MG MAGNESIUM) TABLET    Take 1 tablet (400 mg total) by mouth 2 (two) times daily.    MECLIZINE (ANTIVERT) 25 MG TABLET    Take 1 tablet (25 mg total) by mouth 3 (three) times daily as needed for Dizziness.    MYCOPHENOLATE (CELLCEPT) 250 MG CAP    Take 2 capsules (500 mg total) by mouth 2 (two) times daily.    PANTOPRAZOLE (PROTONIX) 40 MG TABLET    Take 1 tablet (40 mg total) by mouth once daily.    PEN NEEDLE, DIABETIC (BD ULTRA-FINE LO PEN NEEDLE) 32 GAUGE X 5/32" NDLE    USE TO ADMINISTER INSULIN 4 TIMES DAILY.    PEN NEEDLE, DIABETIC (BD ULTRA-FINE LO PEN NEEDLE) 32 GAUGE X 5/32" NDLE    use four times a day " "   PREDNISONE (DELTASONE) 5 MG TABLET    Take 1 tablet (5 mg total) by mouth once daily.    SPIRONOLACTONE (ALDACTONE) 25 MG TABLET    Take 1 tablet (25 mg total) by mouth once daily.    TACROLIMUS (PROGRAF) 0.5 MG CAP    Take 1 capsule (0.5 mg total) by mouth every morning.    VALSARTAN (DIOVAN) 320 MG TABLET    Take 1 tablet (320 mg total) by mouth once daily.       Review of Systems  Constitution: Denies chills, fever, and sweats.  HENT: Denies headaches or blurry vision.  Cardiovascular: Denies chest pain or irregular heart beat.  Respiratory: Positive for shortness of breath after walking half a block.   Gastrointestinal: Denies abdominal pain, nausea, or vomiting.  Musculoskeletal: Positive for pain in legs when walking.   Neurological: Denies dizziness or focal weakness.  Psychiatric/Behavioral: Normal mental status.  Hematologic/Lymphatic: Denies bleeding problem or easy bruising/bleeding.  Skin: Denies rash or suspicious lesions    Physical Examination  /62   Pulse (!) 49   Ht 6' 1" (1.854 m)   Wt 82.5 kg (181 lb 14.1 oz)   BMI 24.00 kg/m²     Constitutional: No acute distress, conversant  HEENT: Sclera anicteric, Pupils equal, round and reactive to light, extraocular motions intact, Oropharynx clear  Neck: No JVD, no carotid bruits  Cardiovascular: regular rate and rhythm, no murmur, rubs or gallops, normal S1/S2  Pulmonary: Clear to auscultation bilaterally  Abdominal: Abdomen soft, nontender, nondistended, positive bowel sounds  Extremities: BLE with no edema or tissue loss.    Pulses:  Carotid pulses are 2+ on the right side, and 2+ on the left side.  Radial pulses are 2+ on the right side, and 2+ on the left side.   Femoral pulses are 2+ on the right side, and 2+ on the left side.  Popliteal pulses are 2+ on the right side, and 2+ on the left side.   Dorsalis pedis pulses are 2+ on the right side, and 2+ on the left side.   Posterior tibial pulses are 2+ on the right side, and 2+ on the left " side.    Skin: No ecchymosis, erythema, or ulcers  Psych: Alert and oriented x 3, appropriate affect  Neuro: CNII-XII intact, no focal deficits    Labs:  Most Recent Data  CBC:   Lab Results   Component Value Date    WBC 6.35 06/16/2024    HGB 7.9 (L) 06/16/2024    HCT 29.4 (L) 06/16/2024     (L) 06/16/2024    MCV 87 06/16/2024    RDW 15.2 (H) 06/16/2024     BMP:   Lab Results   Component Value Date     (L) 06/16/2024    K 3.9 06/16/2024     06/16/2024    CO2 23 06/16/2024    BUN 32 (H) 06/16/2024    CREATININE 1.8 (H) 06/16/2024     (H) 06/16/2024    CALCIUM 8.6 (L) 06/16/2024    MG 1.9 06/16/2024    PHOS 3.3 06/16/2024     LFTS;   Lab Results   Component Value Date    PROT 5.5 (L) 06/16/2024    ALBUMIN 3.0 (L) 06/16/2024    BILITOT 0.6 06/16/2024    AST 14 06/16/2024    ALKPHOS 45 (L) 06/16/2024    ALT <5 (L) 06/16/2024     COAGS:   Lab Results   Component Value Date    INR 1.2 06/04/2024     FLP:   Lab Results   Component Value Date    CHOL 131 06/07/2023    HDL 31 (L) 06/07/2023    LDLCALC 61.4 (L) 06/07/2023    TRIG 193 (H) 06/07/2023    CHOLHDL 23.7 06/07/2023     CARDIAC:   Lab Results   Component Value Date    TROPONINI 0.812 (H) 06/15/2024    BNP 3,203 (H) 06/15/2024       Imaging:    BLE Arterial Ultrasound 2/1/2024:    Right resting WENDI 0.65, is suggestive of moderate right lower extremity arterial disease.    There is a short-segment occlusion at the mid SFA, followed by high-grade stenosis in the distal segment.    The right anterior tibial artery is occluded in the mid segment.    Left resting WENDI 0.70, is suggestive of moderate left lower extremity arterial disease.    The left SFA is occluded in the proximal/mid segment, with distal reconstitution.    The left anterior tibial artery is occluded in the mid segment.    WENDI Study 2/9/2023:   Right WENDI 0.79  Left WENDI 0.77  Right Leg: Segmental pressures and PVR waveforms suggest multi-level mild to moderate peripheral arterial  occlusive disease.   Left Leg: Segmental pressures and PVR waveforms suggest multi-level mild to moderate peripheral arterial occlusive disease.   Findings consistent with aorto-iliac disease.   Rt lower digits: Toe PPG waveforms as described above. - TBI of 0.72 with a Great toe pressure of 139 mmHg is noted.   Lt lower digits: Toe PPG waveforms as described above. - TBI of 0.78 with a Great toe pressure of 149 mmHg is noted.     Echo 2022:  The left ventricle is normal in size with eccentric hypertrophy and normal systolic function. The estimated ejection fraction is 65%.  Normal right ventricular size with normal right ventricular systolic function.  Left ventricular diastolic dysfunction.  Biatrial enlargement.  Mild aortic regurgitation.  PA systolic pressure is at elast 39 mmHg. The IVC is not visualized.    Nuclear Stress Testing 5/15/2023    Normal myocardial perfusion scan. There is no evidence of myocardial ischemia or infarction.    The visually estimated ejection fraction is normal at rest and normal during stress.    There is normal wall motion at rest and post stress.    LV cavity size is normal at rest and normal at stress.    The ECG portion of the study is abnormal but not diagnostic due to resting ST-T abnormalities.    The patient reported no chest pain during the stress test.    There were no arrhythmias during stress.    There are no prior studies for comparison.    Assessment/Plan:  Ravi Zamorano is a 68 y.o. male with pAF (on Eliquis), HTN, HLD, DM2, hx of ESRD previously on HD status post  donor kidney transplant, smoking, who presetns for a follow up appointment.    PAD- Mr. Zamorano presents with [place] stage 3 claudication symptoms.  Symptoms have improved with starting walking program of at least 30 minutes a day/5 days a week.  No rest pain or tissue loss.  Will hold off of starting cilostazol due to potential interaction with flecainide for QT interval prolongation.   Continue ASA 81 mg daily, Eliqus 5 mg bid, and atorvastatin 40 mg daily.  Pt to start walking program with goal of at least 30 minutes a day/5 days a week. Check BLE arterial ultrasound prior to next visit.     2. Paroxysmal Afib- He is now s/p Afib ablation on 6/11/2024.  Continue carvedilol 25 mg bid for rate control, and Eliquis 5 mg bid for anticoagulation. Follow up with EP as scheduled.     3. HLD- Continue ASA 81 mg daily, Eliqus 5 mg bid, and atorvastatin 40 mg daily.    4. Overweight- Encourage diet, exercise, and weight loss.     5. Smoking- Encourage smoking cessation.     Follow up in 6 months    Total duration of face to face visit time 30 minutes.  Total time spent counseling greater than fifty percent of total visit time.  Counseling included discussion regarding imaging findings, diagnosis, possibilities, treatment options, risks and benefits.  The patient had many questions regarding the options and long-term effects.    Darwin Gottlieb MD, PhD  Interventional Cardiology

## 2024-06-25 NOTE — TELEPHONE ENCOUNTER
Discussed labs with patient showing PHUONG. Will hold Spironolactone and Lasix and repeat BMP Friday morning before resuming Lasix at lower dose if possible. He states symptoms have improved significantly. Edema is gone and weight is down.

## 2024-06-26 ENCOUNTER — ANESTHESIA (OUTPATIENT)
Dept: ENDOSCOPY | Facility: HOSPITAL | Age: 69
End: 2024-06-26
Payer: MEDICARE

## 2024-06-26 ENCOUNTER — ANESTHESIA EVENT (OUTPATIENT)
Dept: ENDOSCOPY | Facility: HOSPITAL | Age: 69
End: 2024-06-26
Payer: MEDICARE

## 2024-06-26 ENCOUNTER — HOSPITAL ENCOUNTER (OUTPATIENT)
Facility: HOSPITAL | Age: 69
Discharge: HOME | End: 2024-06-26
Attending: INTERNAL MEDICINE | Admitting: NURSE PRACTITIONER
Payer: MEDICARE

## 2024-06-26 VITALS
BODY MASS INDEX: 23.19 KG/M2 | RESPIRATION RATE: 10 BRPM | WEIGHT: 175 LBS | TEMPERATURE: 98 F | SYSTOLIC BLOOD PRESSURE: 186 MMHG | OXYGEN SATURATION: 99 % | HEIGHT: 73 IN | HEART RATE: 44 BPM | DIASTOLIC BLOOD PRESSURE: 80 MMHG

## 2024-06-26 DIAGNOSIS — D69.6 THROMBOCYTOPENIA, UNSPECIFIED: Primary | ICD-10-CM

## 2024-06-26 DIAGNOSIS — D69.6 THROMBOCYTOPENIA: ICD-10-CM

## 2024-06-26 PROBLEM — D64.89 OTHER SPECIFIED ANEMIAS: Status: ACTIVE | Noted: 2024-06-26

## 2024-06-26 LAB
POCT GLUCOSE: 135 MG/DL (ref 70–110)
POCT GLUCOSE: 177 MG/DL (ref 70–110)
REASON FOR REFERRAL (NARRATIVE): NORMAL

## 2024-06-26 PROCEDURE — 88184 FLOWCYTOMETRY/ TC 1 MARKER: CPT | Performed by: PATHOLOGY

## 2024-06-26 PROCEDURE — 88299 UNLISTED CYTOGENETIC STUDY: CPT

## 2024-06-26 PROCEDURE — 88305 TISSUE EXAM BY PATHOLOGIST: CPT | Mod: 59 | Performed by: PATHOLOGY

## 2024-06-26 PROCEDURE — 88185 FLOWCYTOMETRY/TC ADD-ON: CPT | Performed by: PATHOLOGY

## 2024-06-26 PROCEDURE — 88313 SPECIAL STAINS GROUP 2: CPT | Mod: 59 | Performed by: PATHOLOGY

## 2024-06-26 PROCEDURE — 88342 IMHCHEM/IMCYTCHM 1ST ANTB: CPT | Performed by: PATHOLOGY

## 2024-06-26 PROCEDURE — 88311 DECALCIFY TISSUE: CPT | Performed by: PATHOLOGY

## 2024-06-26 PROCEDURE — 63600175 PHARM REV CODE 636 W HCPCS: Performed by: NURSE ANESTHETIST, CERTIFIED REGISTERED

## 2024-06-26 PROCEDURE — 25000003 PHARM REV CODE 250: Performed by: NURSE ANESTHETIST, CERTIFIED REGISTERED

## 2024-06-26 PROCEDURE — 88341 IMHCHEM/IMCYTCHM EA ADD ANTB: CPT | Performed by: PATHOLOGY

## 2024-06-26 PROCEDURE — 88237 TISSUE CULTURE BONE MARROW: CPT

## 2024-06-26 PROCEDURE — 25000003 PHARM REV CODE 250: Performed by: ANESTHESIOLOGY

## 2024-06-26 PROCEDURE — 38222 DX BONE MARROW BX & ASPIR: CPT | Performed by: INTERNAL MEDICINE

## 2024-06-26 PROCEDURE — 37000008 HC ANESTHESIA 1ST 15 MINUTES: Performed by: INTERNAL MEDICINE

## 2024-06-26 PROCEDURE — 82962 GLUCOSE BLOOD TEST: CPT | Performed by: INTERNAL MEDICINE

## 2024-06-26 RX ORDER — VALSARTAN 160 MG/1
320 TABLET ORAL ONCE
Status: COMPLETED | OUTPATIENT
Start: 2024-06-26 | End: 2024-06-26

## 2024-06-26 RX ORDER — PROPOFOL 10 MG/ML
VIAL (ML) INTRAVENOUS CONTINUOUS PRN
Status: DISCONTINUED | OUTPATIENT
Start: 2024-06-26 | End: 2024-06-26

## 2024-06-26 RX ORDER — LIDOCAINE HYDROCHLORIDE 20 MG/ML
INJECTION INTRAVENOUS
Status: DISCONTINUED | OUTPATIENT
Start: 2024-06-26 | End: 2024-06-26

## 2024-06-26 RX ORDER — SODIUM CHLORIDE 9 MG/ML
INJECTION, SOLUTION INTRAVENOUS CONTINUOUS
Status: DISCONTINUED | OUTPATIENT
Start: 2024-06-26 | End: 2024-06-26 | Stop reason: HOSPADM

## 2024-06-26 RX ORDER — HYDRALAZINE HYDROCHLORIDE 25 MG/1
25 TABLET, FILM COATED ORAL ONCE
Status: COMPLETED | OUTPATIENT
Start: 2024-06-26 | End: 2024-06-26

## 2024-06-26 RX ADMIN — SODIUM CHLORIDE: 0.9 INJECTION, SOLUTION INTRAVENOUS at 12:06

## 2024-06-26 RX ADMIN — HYDRALAZINE HYDROCHLORIDE 25 MG: 25 TABLET ORAL at 10:06

## 2024-06-26 RX ADMIN — LIDOCAINE HYDROCHLORIDE 100 MG: 20 INJECTION INTRAVENOUS at 12:06

## 2024-06-26 RX ADMIN — PROPOFOL 200 MCG/KG/MIN: 10 INJECTION, EMULSION INTRAVENOUS at 12:06

## 2024-06-26 RX ADMIN — VALSARTAN 320 MG: 160 TABLET, FILM COATED ORAL at 10:06

## 2024-06-26 NOTE — ANESTHESIA POSTPROCEDURE EVALUATION
Anesthesia Post Evaluation    Patient: Ravi Zamorano    Procedure(s) Performed: Procedure(s) (LRB):  Biopsy-bone marrow (Left)    Final Anesthesia Type: general      Patient location during evaluation: PACU  Patient participation: Yes- Able to Participate  Level of consciousness: awake  Post-procedure vital signs: reviewed and stable  Pain management: adequate  Airway patency: patent    PONV status at discharge: No PONV  Anesthetic complications: no      Cardiovascular status: stable  Respiratory status: unassisted, spontaneous ventilation and room air  Hydration status: euvolemic  Follow-up not needed.              Vitals Value Taken Time   /59 06/26/24 1243   Temp 36.4 °C (97.5 °F) 06/26/24 1243   Pulse 47 06/26/24 1243   Resp 14 06/26/24 1243   SpO2 99 % 06/26/24 1243         No case tracking events are documented in the log.      Pain/Gurmeet Score: Gurmeet Score: 9 (6/26/2024 12:44 PM)

## 2024-06-26 NOTE — HPI
Mr. Zamorano is a 68 year old male undergoing evaluation for worsening anemia and thrombocytopenia with Dr. De La Cruz. Presented to to endoscopy suite for bone marrow biopsy. He is feeling well, denies fevers/chills/CP/SOB/abdominal pain/N/V/D. Mild ecchymosis of upper extremities. He was unable to toelrate BMBx in clinic due to pain control and thus proceeding with bmbx under sedation. He has been NPO since midnight. No medications today.    Informed consent obtained for biopsy. All questions/concerns addressed.

## 2024-06-26 NOTE — DISCHARGE SUMMARY
[facility] 4th Floor  Hematology  Bone Marrow Transplant  Discharge Summary      Patient Name: Ravi Zamorano  MRN: [id number]  Admission Date: 6/26/2024  Hospital Length of Stay: 0 days  Discharge Date and Time: 6/26/2024  1:33 PM  Attending Physician: Bridger Zapata MD   Discharging Provider: Tawana Rivera NP  Primary Care Provider: Mima Mack MD    HPI: Mr. Zamorano is a 68 year old male undergoing evaluation for worsening anemia and thrombocytopenia with Dr. De La Cruz. Presented to to endoscopy suite for bone marrow biopsy. He is feeling well, denies fevers/chills/CP/SOB/abdominal pain/N/V/D. Mild ecchymosis of upper extremities. He was unable to toelrate BMBx in clinic due to pain control and thus proceeding with bmbx under sedation. He has been NPO since midnight. No medications today.    Informed consent obtained for biopsy. All questions/concerns addressed.     Procedure(s) (LRB):  Biopsy-bone marrow (Right)     Hospital Course: Patient admitted to endoscopy today for a bone marrow aspiration and biopsy. Consent was obtained for a bone marrow biopsy. Patient was sedated per anesthesia and a bone marrow biopsy and aspiration was performed in endoscopy suite. Patient was then transferred to post-op and discharged home when appropriate per anesthesia protocol.     Goals of Care Treatment Preferences:  Code Status: Full Code    Pending Diagnostic Studies:       None          Final Active Diagnoses:    Diagnosis Date Noted POA    PRINCIPAL PROBLEM:  Other specified anemias [D64.89] 06/26/2024 Unknown    Thrombocytopenia [D69.6] 06/26/2024 Unknown      Problems Resolved During this Admission:      Discharged Condition: stable    Disposition: Home or Self Care    Follow Up:   Future Appointments   Date Time Provider Department Center   6/28/2024  9:05 AM LAB, APPOINTMENT [facility]   6/28/2024 10:30 AM Mima Mack MD [facility]  "  7/1/2024  7:40 AM LAB, TRANSPLANT [facility] LABTX [facility]   7/3/2024  2:00 PM Syl Dave, DPM [facility] P[facility]   7/10/2024  9:30 AM Estuardo Obrien MD [facility] GASTRO [facility]   7/10/2024 11:00 AM Nayely Vital PA-C [facility] CARDIO [facility]   7/11/2024  3:00 PM Francesco Lopez MD [facility] NEPHRO [facility]   7/16/2024  9:00 AM Tico De La Cruz MD [facility] HEMONC3 [facility]   7/23/2024  1:00 PM Karan Lopez APRN, FNP [facility] PCW   8/6/2024  9:20 AM Mony Christensen OD [facility] OPTOMTY [facility]   8/21/2024  1:30 PM Mima Mack MD [facility]        Patient Instructions:      Notify your health care provider if you experience any of the following:  temperature >100.4     Notify your health care provider if you experience any of the following:  severe uncontrolled pain     Notify your health care provider if you experience any of the following:  redness, tenderness, or signs of infection (pain, swelling, redness, odor or green/yellow discharge around incision site)     Remove dressing in 24 hours     Activity as tolerated     Shower on day dressing removed (No bath)     Medications:  Reconciled Home Medications:      Medication List        ASK your doctor about these medications      apixaban 5 mg Tab  Commonly known as: ELIQUIS  Take 1 tablet (5 mg total) by mouth 2 (two) times daily.     aspirin 81 MG Chew  Take 81 mg by mouth once daily.     atorvastatin 40 MG tablet  Commonly known as: LIPITOR  Take 1 tablet (40 mg total) by mouth once daily.     * BD ULTRA-FINE LO PEN NEEDLE 32 gauge x 5/32" Ndle  Generic drug: pen needle, diabetic  USE TO ADMINISTER INSULIN 4 TIMES DAILY.     * BD ULTRA-FINE LO PEN NEEDLE 32 gauge x 5/32" Ndle  Generic drug: pen needle, diabetic  use four times a day     blood-glucose sensor Kayla  Gin 3, use as directed, change every 14 days , e 11.65     carvediloL 25 MG tablet  Commonly known as: COREG  Take 1 tablet (25 mg total) by mouth 2 (two) times " daily.     furosemide 40 MG tablet  Commonly known as: LASIX  Take 1 tablet (40 mg total) by mouth 2 (two) times daily.     gabapentin 300 MG capsule  Commonly known as: NEURONTIN  Take 1 capsule by mouth in the morning, 1 capsule midday, and 3 capsules at night.     hydrALAZINE 25 MG tablet  Commonly known as: APRESOLINE  Take 1 tablet (25 mg total) by mouth every 12 (twelve) hours.     insulin aspart U-100 100 unit/mL (3 mL) Inpn pen  Commonly known as: NovoLOG  Inject 16 units w/ breakfast, 10 units at lunch and dinner scale 180-230+2, 231-280+4, 281-330+6, 331-380+8, >380+10.  *Max daily 66 units.*     JARDIANCE 10 mg tablet  Generic drug: empagliflozin  Take 1 tablet (10 mg total) by mouth once daily.     LANTUS SOLOSTAR U-100 INSULIN 100 unit/mL (3 mL) Inpn pen  Generic drug: insulin glargine U-100 (Lantus)  Inject 20 Units into the skin every morning.     magnesium oxide 400 mg (241.3 mg magnesium) tablet  Commonly known as: MAG-OX  Take 1 tablet (400 mg total) by mouth 2 (two) times daily.     meclizine 25 mg tablet  Commonly known as: ANTIVERT  Take 1 tablet (25 mg total) by mouth 3 (three) times daily as needed for Dizziness.     MULTAQ 400 mg Tab  Generic drug: dronedarone  Take 1 tablet (400 mg total) by mouth 2 (two) times daily with meals.     mycophenolate 250 mg Cap  Commonly known as: CELLCEPT  Take 2 capsules (500 mg total) by mouth 2 (two) times daily.     pantoprazole 40 MG tablet  Commonly known as: PROTONIX  Take 1 tablet (40 mg total) by mouth once daily.     predniSONE 5 MG tablet  Commonly known as: DELTASONE  Take 1 tablet (5 mg total) by mouth once daily.     tacrolimus 0.5 MG Cap  Commonly known as: PROGRAF  Take 1 capsule (0.5 mg total) by mouth every morning.     TRUE METRIX GLUCOSE METER Misc  Generic drug: blood-glucose meter  Use to check blood glucose 1 times daily, to use with insurance preferred meter     TRUE METRIX GLUCOSE TEST STRIP Strp  Generic drug: blood sugar  diagnostic  Use to check blood glucose 1 times daily, to use with insurance preferred meter     TRUEPLUS LANCETS 30 gauge Misc  Generic drug: lancets  Use to check blood glucose 1 times daily, to use with insurance preferred meter     valsartan 320 MG tablet  Commonly known as: DIOVAN  Take 1 tablet (320 mg total) by mouth once daily.           * This list has 2 medication(s) that are the same as other medications prescribed for you. Read the directions carefully, and ask your doctor or other care provider to review them with you.                  Анна Matthews NP  Bone Marrow Transplant  [facility] 4[facility]

## 2024-06-26 NOTE — H&P
[facility] 4th Floor  Hematology  Bone Marrow Transplant  H&P    Subjective:     Principal Problem: Other specified anemias    HPI: Mr. Zamorano is a 68 year old male undergoing evaluation for worsening anemia and thrombocytopenia with Dr. De La Cruz. Presented to to endoscopy suite for bone marrow biopsy. He is feeling well, denies fevers/chills/CP/SOB/abdominal pain/N/V/D. Mild ecchymosis of upper extremities. He was unable to toelrate BMBx in clinic due to pain control and thus proceeding with bmbx under sedation. He has been NPO since midnight. No medications today.    Informed consent obtained for biopsy. All questions/concerns addressed.     Review of systems above, otherwise negative    Objective:    Physical Exam  Vitals reviewed.   Constitutional:       General: He is not in acute distress.     Appearance: Normal appearance.   HENT:      Head: Normocephalic.      Right Ear: External ear normal.      Left Ear: External ear normal.      Nose: Nose normal.      Mouth/Throat:      Mouth: Mucous membranes are moist.      Pharynx: Oropharynx is clear.   Eyes:      Extraocular Movements: Extraocular movements intact.      Pupils: Pupils are equal, round, and reactive to light.   Cardiovascular:      Rate and Rhythm: Normal rate and regular rhythm.      Pulses: Normal pulses.   Pulmonary:      Effort: Pulmonary effort is normal.      Breath sounds: Normal breath sounds.   Abdominal:      General: Abdomen is flat. There is no distension.      Palpations: Abdomen is soft. There is no mass.   Musculoskeletal:         General: No swelling or deformity.      Cervical back: Neck supple.   Skin:     General: Skin is warm.      Coloration: Skin is not jaundiced.      Findings: Bruising (scattered ecchymosis of upper extremity) present.   Neurological:      General: No focal deficit present.      Mental Status: He is alert and oriented to person, place, and time.      Motor: No weakness.   Psychiatric:         Mood  and Affect: Mood normal.           Assessment/Plan:     Hematology  Thrombocytopenia  Undergoing evaluation with Dr. De La Cruz.  Bone marrow biopsy under sedation in endoscopy suite today     Oncology  * Other specified anemias  Undergoing evaluation with Dr. De La Cruz.  Bone marrow biopsy under sedation in endoscopy suite today         VTE Risk Mitigation (From admission, onward)      None            Disposition: Follow up as scheduled with Dr. De La Cruz in July for pathology review     Tawana Rivera NP  Bone Marrow Transplant  Hematology  [facility] 4[facility]

## 2024-06-26 NOTE — ASSESSMENT & PLAN NOTE
Undergoing evaluation with Dr. De La Cruz.  Bone marrow biopsy under sedation in endoscopy suite today

## 2024-06-26 NOTE — TRANSFER OF CARE
"Anesthesia Transfer of Care Note    Patient: Ravi Zamorano    Procedure(s) Performed: Procedure(s) (LRB):  Biopsy-bone marrow (Left)    Patient location: PACU    Anesthesia Type: general    Transport from OR: Transported from OR on 2-3 L/min O2 by NC with adequate spontaneous ventilation    Post pain: adequate analgesia    Post assessment: no apparent anesthetic complications    Post vital signs: stable    Level of consciousness: sedated    Nausea/Vomiting: no nausea/vomiting    Complications: none    Transfer of care protocol was followed      Last vitals: Visit Vitals  BP (!) 194/85   Pulse (!) 50   Temp 36.2 °C (97.2 °F) (Temporal)   Resp 16   Ht 6' 1" (1.854 m)   Wt 79.4 kg (175 lb)   SpO2 100%   BMI 23.09 kg/m²     "

## 2024-06-26 NOTE — PROCEDURES
"Ravi Zamorano is a 68 y.o. male patient.    Temp: 97.5 °F (36.4 °C) (06/26/24 1243)  Pulse: (!) 47 (06/26/24 1243)  Resp: 14 (06/26/24 1243)  BP: (!) 118/59 (06/26/24 1243)  SpO2: 99 % (06/26/24 1243)  Weight: 79.4 kg (175 lb) (06/26/24 1026)  Height: 6' 1" (185.4 cm) (06/26/24 1026)       Bone marrow    Date/Time: 6/26/2024 12:57 PM    Performed by: Анна Matthews NP  Authorized by: Lacey Chapman NP    Aspiration?: Yes   Biopsy?: Yes        6/26/2024  PROCEDURE NOTE:  Date of Procedure: 06/26/2024   Bone Marrow Biopsy and Aspiration  Indication: Cytopenias  Consent: Informed consent was obtained from patient.  Timeout: Done and documented.  Position: Left lateral   Site: Right posterior illiac crest.  Prep: Betadine.  Needle used: 11 gauge Jamshidi needle.  Anesthetic: 1% lidocaine 5 cc.  Biopsy: The biopsy needle was introduced into the marrow cavity and 16cc of aspirate was obtained without complications and sent for flow cytometry, Heme DNA/RNA hold, and cytogenetics. Core biopsy obtained without difficulty and sent for routine histologic examination.  Complications: None.  Disposition: The patient was discharged home per anesthesia protocol.  Blood loss: Minimal.     KEO Mcgregor  Hematology/Oncology/Bone Marrow Transplant     "

## 2024-06-26 NOTE — ANESTHESIA PREPROCEDURE EVALUATION
06/26/2024  Ravi Zamorano is a 68 y.o., male.    Procedure: Biopsy-bone marrow (Left) - 6/24-pt knows to hold aspirin and eliquis as instructed by hem/onc, precall complete-Kpvt   Anesthesia type: Gen Natural Airway   Diagnosis:      Symptomatic anemia [D64.9]      S/P kidney transplant [Z94.0]       Pre-operative evaluation for Procedure(s) (LRB):  Biopsy-bone marrow (Left)      Encounter Diagnoses   Name Primary?    Thrombocytopenia, unspecified Yes    Thrombocytopenia        Review of patient's allergies indicates:   Allergen Reactions    Nifedipine Other (See Comments)     Gingival hyperplasia       Medications Prior to Admission   Medication Sig Dispense Refill Last Dose    atorvastatin (LIPITOR) 40 MG tablet Take 1 tablet (40 mg total) by mouth once daily. 90 tablet 3 6/25/2024    carvediloL (COREG) 25 MG tablet Take 1 tablet (25 mg total) by mouth 2 (two) times daily. 180 tablet 3 6/25/2024    dronedarone (MULTAQ) 400 mg Tab Take 1 tablet (400 mg total) by mouth 2 (two) times daily with meals. 60 tablet 11 6/25/2024    empagliflozin (JARDIANCE) 10 mg tablet Take 1 tablet (10 mg total) by mouth once daily. 30 tablet 11 6/25/2024    furosemide (LASIX) 40 MG tablet Take 1 tablet (40 mg total) by mouth 2 (two) times daily. 60 tablet 11 6/25/2024    gabapentin (NEURONTIN) 300 MG capsule Take 1 capsule by mouth in the morning, 1 capsule midday, and 3 capsules at night. 450 capsule 3 6/25/2024    hydrALAZINE (APRESOLINE) 25 MG tablet Take 1 tablet (25 mg total) by mouth every 12 (twelve) hours. 60 tablet 11 6/25/2024    insulin aspart U-100 (NOVOLOG) 100 unit/mL (3 mL) InPn pen Inject 16 units w/ breakfast, 10 units at lunch and dinner scale 180-230+2, 231-280+4, 281-330+6, 331-380+8, >380+10.  *Max daily 66 units.* (Patient taking differently: Inject 15 Units into the skin 3 (three) times daily with  "meals. Inject 16 units w/ breakfast, 10 units at lunch and dinner scale 180-230+2, 231-280+4, 281-330+6, 331-380+8, >380+10.  *Max daily 66 units.*) 30 mL 6 6/25/2024    insulin glargine U-100, Lantus, (LANTUS SOLOSTAR U-100 INSULIN) 100 unit/mL (3 mL) InPn pen Inject 20 Units into the skin every morning. 15 mL 6 6/25/2024    magnesium oxide (MAG-OX) 400 mg (241.3 mg magnesium) tablet Take 1 tablet (400 mg total) by mouth 2 (two) times daily. 60 tablet 6 6/25/2024    meclizine (ANTIVERT) 25 mg tablet Take 1 tablet (25 mg total) by mouth 3 (three) times daily as needed for Dizziness. 21 tablet 3 6/25/2024    mycophenolate (CELLCEPT) 250 mg Cap Take 2 capsules (500 mg total) by mouth 2 (two) times daily. 120 capsule 11 6/25/2024    pantoprazole (PROTONIX) 40 MG tablet Take 1 tablet (40 mg total) by mouth once daily. 30 tablet 0 6/25/2024    predniSONE (DELTASONE) 5 MG tablet Take 1 tablet (5 mg total) by mouth once daily. 30 tablet 11 6/25/2024    tacrolimus (PROGRAF) 0.5 MG Cap Take 1 capsule (0.5 mg total) by mouth every morning. 30 capsule 11 6/25/2024    valsartan (DIOVAN) 320 MG tablet Take 1 tablet (320 mg total) by mouth once daily. 60 tablet 0 6/25/2024    apixaban (ELIQUIS) 5 mg Tab Take 1 tablet (5 mg total) by mouth 2 (two) times daily. 180 tablet 3 6/21/2024    aspirin 81 MG Chew Take 81 mg by mouth once daily.   6/22/2024    blood sugar diagnostic Strp Use to check blood glucose 1 times daily, to use with insurance preferred meter 100 each 11     blood-glucose meter Misc Use to check blood glucose 1 times daily, to use with insurance preferred meter 1 each 0     blood-glucose sensor Kayla Gin 3, use as directed, change every 14 days , e 11.65 2 each 11     lancets 30 gauge Misc Use to check blood glucose 1 times daily, to use with insurance preferred meter 100 each 3     pen needle, diabetic (BD ULTRA-FINE LO PEN NEEDLE) 32 gauge x 5/32" Ndle USE TO ADMINISTER INSULIN 4 TIMES DAILY. 400 each 3     pen " "needle, diabetic (BD ULTRA-FINE LO PEN NEEDLE) 32 gauge x 5/32" Ndle use four times a day 100 each 11          0.9% NaCl   Intravenous Continuous           Current Facility-Administered Medications on File Prior to Encounter   Medication Dose Route Frequency Provider Last Rate Last Admin    balanced salt irrigation intra-ocular solution 1 drop  1 drop Right Eye On Call Procedure Jennifer Palacio MD        phenylephrine HCL 10% ophthalmic solution 1 drop  1 drop Right Eye PRJennifer Guzman MD        proparacaine 0.5 % ophthalmic solution 1 drop  1 drop Right Eye Daily PRJennifer Guzman MD        sodium chloride 0.9% flush 10 mL  10 mL Intravenous PRJennifer Guzman MD        TETRAcaine HCl (PF) 0.5 % Drop 1 drop  1 drop Right Eye PRJennifer Guzman MD         Current Outpatient Medications on File Prior to Encounter   Medication Sig Dispense Refill    atorvastatin (LIPITOR) 40 MG tablet Take 1 tablet (40 mg total) by mouth once daily. 90 tablet 3    carvediloL (COREG) 25 MG tablet Take 1 tablet (25 mg total) by mouth 2 (two) times daily. 180 tablet 3    dronedarone (MULTAQ) 400 mg Tab Take 1 tablet (400 mg total) by mouth 2 (two) times daily with meals. 60 tablet 11    empagliflozin (JARDIANCE) 10 mg tablet Take 1 tablet (10 mg total) by mouth once daily. 30 tablet 11    gabapentin (NEURONTIN) 300 MG capsule Take 1 capsule by mouth in the morning, 1 capsule midday, and 3 capsules at night. 450 capsule 3    insulin aspart U-100 (NOVOLOG) 100 unit/mL (3 mL) InPn pen Inject 16 units w/ breakfast, 10 units at lunch and dinner scale 180-230+2, 231-280+4, 281-330+6, 331-380+8, >380+10.  *Max daily 66 units.* (Patient taking differently: Inject 15 Units into the skin 3 (three) times daily with meals. Inject 16 units w/ breakfast, 10 units at lunch and dinner scale 180-230+2, 231-280+4, 281-330+6, 331-380+8, >380+10.  *Max daily 66 units.*) 30 mL 6    insulin glargine U-100, Lantus, (LANTUS SOLOSTAR " "U-100 INSULIN) 100 unit/mL (3 mL) InPn pen Inject 20 Units into the skin every morning. 15 mL 6    magnesium oxide (MAG-OX) 400 mg (241.3 mg magnesium) tablet Take 1 tablet (400 mg total) by mouth 2 (two) times daily. 60 tablet 6    meclizine (ANTIVERT) 25 mg tablet Take 1 tablet (25 mg total) by mouth 3 (three) times daily as needed for Dizziness. 21 tablet 3    pantoprazole (PROTONIX) 40 MG tablet Take 1 tablet (40 mg total) by mouth once daily. 30 tablet 0    predniSONE (DELTASONE) 5 MG tablet Take 1 tablet (5 mg total) by mouth once daily. 30 tablet 11    valsartan (DIOVAN) 320 MG tablet Take 1 tablet (320 mg total) by mouth once daily. 60 tablet 0    apixaban (ELIQUIS) 5 mg Tab Take 1 tablet (5 mg total) by mouth 2 (two) times daily. 180 tablet 3    aspirin 81 MG Chew Take 81 mg by mouth once daily.      blood sugar diagnostic Strp Use to check blood glucose 1 times daily, to use with insurance preferred meter 100 each 11    blood-glucose meter Misc Use to check blood glucose 1 times daily, to use with insurance preferred meter 1 each 0    blood-glucose sensor Kayla Gin 3, use as directed, change every 14 days , e 11.65 2 each 11    lancets 30 gauge Misc Use to check blood glucose 1 times daily, to use with insurance preferred meter 100 each 3    pen needle, diabetic (BD ULTRA-FINE LO PEN NEEDLE) 32 gauge x 5/32" Ndle USE TO ADMINISTER INSULIN 4 TIMES DAILY. 400 each 3    pen needle, diabetic (BD ULTRA-FINE LO PEN NEEDLE) 32 gauge x 5/32" Ndle use four times a day 100 each 11    [DISCONTINUED] insulin lispro (HUMALOG KWIKPEN INSULIN) 100 unit/mL pen Inject 18 units w/ breakfast, 16 units w/ lunch and dinner plus scale 150-200 +2, 201-250 +4, 251-300 +6, 301-350 +8. 30 mL 4       Past Medical History:  No date: Anticoagulant long-term use  07/29/2014: Anxiety  No date: Arthritis  No date: atrial fibrillation  2017: Bilateral diabetic retinopathy  12/28/2016: CKD (chronic kidney disease) stage 2, GFR 60-89 " ml/min  07/29/2014: CKD (chronic kidney disease) stage 4, GFR 15-29 ml/min  2014: Colon polyps  07/29/2014: Depression - situational  No date: Diabetes mellitus  07/29/2014: Diabetes type 2 since 2000 07/29/2014: Diabetic neuropathy  No date: Encounter for blood transfusion  10/03/2019: History of cardioversion  07/29/2014: History of hepatitis C, s/p successful Rx w/ SVR24 - 9/ 2017      Comment:  Genotype 1a, treatment naive 10/2014 liver biopsy -                grade 1 / stage 1 Completed Harvoni w/ SVR  07/29/2014: Hyperlipidemia  No date: Hypertension  No date: Neuropathy  07/29/2014: Organ transplant candidate  No date: Pancreatitis  11/05/2016: S/P cadaveric kidney transplant 11/5/2016. ESRD secondary   to HTN/DMII  12/28/2016: Stage 3a chronic kidney disease  07/29/2014: Type 2 diabetes mellitus with stage 3a chronic kidney   disease, with long-term current use of insulin    Past Surgical History:   Procedure Laterality Date    ABLATION OF ARRHYTHMOGENIC FOCUS FOR ATRIAL FIBRILLATION N/A 6/11/2024    Procedure: Ablation atrial fibrillation;  Surgeon: Bronson Bowden MD;  Location: [facility] EP LAB;  Service: Cardiology;  Laterality: N/A;  AF, FELICIANO (cx if SR), PVI, RFA, Carto, Gen, GP, 3 Prep    ABLATION, ATRIAL FLUTTER, TYPICAL  6/11/2024    Procedure: Ablation, Atrial Flutter, Typical;  Surgeon: Bronson Bowden MD;  Location: [facility] EP [facility];  Service: Cardiology;;    APPENDECTOMY      CARDIOVERSION  6/11/2024    Procedure: Cardioversion;  Surgeon: Bronson Bowden MD;  Location: [facility] EP [facility];  Service: Cardiology;;    CATARACT EXTRACTION W/  INTRAOCULAR LENS IMPLANT Right 3/13/2024    Procedure: EXTRACTION, CATARACT, WITH IOL INSERTION;  Surgeon: Jennifer Palacio MD;  Location: [facility] OR;  Service: Ophthalmology;  Laterality: Right;    CATARACT EXTRACTION W/  INTRAOCULAR LENS IMPLANT Left 4/10/2024    Procedure: EXTRACTION, CATARACT, WITH IOL INSERTION;  Surgeon: Jennifer Palacio MD;  Location: [facility] OR;   "Service: Ophthalmology;  Laterality: Left;    COLONOSCOPY N/A 2020    Procedure: COLONOSCOPY;  Surgeon: Tico Bell MD;  [facility]  Service: Endoscopy;  Laterality: N/A;  ok to hold katyquis per Dr. Valadez, see telephone encounter 2020-[place]  covid test  [place]    KIDNEY TRANSPLANT      TRANSESOPHAGEAL ECHOCARDIOGRAPHY N/A 2019    Procedure: ECHOCARDIOGRAM, TRANSESOPHAGEAL;  [facility];  Location: [facility] EP LAB;  Service: Cardiology;  Laterality: N/A;    TRANSESOPHAGEAL ECHOCARDIOGRAPHY N/A 2024    Procedure: ECHOCARDIOGRAM, TRANSESOPHAGEAL;  Surgeon: Grayson Lin III, MD;  Location: [facility] EP LAB;  Service: Cardiology;  Laterality: N/A;    TREATMENT OF CARDIAC ARRHYTHMIA N/A 2019    Procedure: CARDIOVERSION;  Surgeon: Bronson Bowden MD;  Location: [facility] EP LAB;  Service: Cardiology;  Laterality: N/A;  AF, FELICIANO, DCCV, MAC, GP, 3 PREP       Social History     Tobacco Use   Smoking Status Former    Current packs/day: 0.00    Average packs/day: 0.5 packs/day for 32.0 years (16.0 ttl pk-yrs)    Types: Cigarettes    Start date: 1984    Quit date: 2016    Years since quittin.5   Smokeless Tobacco Never   Tobacco Comments    Pt reports that he quit in , but started up again in . pt reports he is currently working on quitting again       Social History     Substance and Sexual Activity   Alcohol Use Yes    Comment: Pt reports occasional beers on Sundays. Pt reports drinking daily prior to ESRD diagnosis.       Physical Activity: Inactive (5/3/2024)    Exercise Vital Sign     Days of Exercise per Week: 0 days     Minutes of Exercise per Session: 0 min         No results for input(s): "HCT" in the last 72 hours.  No results for input(s): "PLT" in the last 72 hours.  Recent Labs     24  1443   K 5.5*     Recent Labs     24  1443   CREATININE 2.7*     Recent Labs     24  1443   *     No results for " "input(s): "PT" in the last 72 hours.  Vitals:    06/26/24 1026   BP: (!) 195/101   BP Location: Left arm   Patient Position: Lying   Pulse: (!) 50   Resp: 16   Temp: 36.2 °C (97.2 °F)   TempSrc: Temporal   SpO2: 100%   Weight: 79.4 kg (175 lb)   Height: 6' 1" (1.854 m)                       Pre-op Assessment    I have reviewed the Patient Summary Reports.     I have reviewed the Nursing Notes. I have reviewed the NPO Status.   I have reviewed the Medications.     Review of Systems  Anesthesia Hx:  No problems with previous Anesthesia                Hematology/Oncology:    Oncology Normal                Hematology Comments: Last eliquis Thursday for afib                    Cardiovascular:     Hypertension, well controlled   Denies MI.        Denies Angina.               Shortness of Breath                    Hypertension     Atrial Fibrillation     Pulmonary:    Denies COPD.  Denies Asthma.   Denies Shortness of breath.   SOB improved since ablation, was admitted recently with SOB due to CHF exacerbation, no SOB now.               Renal/:  Chronic Renal Disease   Transplant kidney 2016 working well     Kidney Function/Disease, Chronic Kidney Disease (CKD) , CKD Stage III (GFR 30-59)            Hepatic/GI:      Liver Disease, Hepatitis (treated with harvoni), C        Liver Disease, Hepatitis        Neurological:  Denies TIA.  Denies CVA. Neuromuscular Disease,   Denies Seizures.                              Neuromuscular Disease   Endocrine:  Diabetes, type 2, using insulin   Lantus 25 am, took none this am, accucheck 177   Diabetes, Type 2 Diabetes                      Psych:  Psychiatric History                  Physical Exam  General: Well nourished, Cooperative, Alert and Oriented    Airway:  Mallampati: II   Mouth Opening: Normal  TM Distance: Normal  Tongue: Normal  Neck ROM: Normal ROM    Dental:  Intact  Partial upper and lower      Anesthesia Plan  Type of Anesthesia, risks & benefits " discussed:    Anesthesia Type: Gen Natural Airway  Intra-op Monitoring Plan: Standard ASA Monitors  Post Op Pain Control Plan: multimodal analgesia  Induction:  IV  Informed Consent: Informed consent signed with the Patient and all parties understand the risks and agree with anesthesia plan.  All questions answered.   ASA Score: 3  Day of Surgery Review of History & Physical: H&P Update referred to the surgeon/provider.I have interviewed and examined the patient. I have reviewed the patient's H&P dated:     Ready For Surgery From Anesthesia Perspective.     .    Date/Time: 6/11/2024 7:28 AM     Performed by: Wero Atkins III, CRNA  Authorized by: Wero Atkins III, CRNA    Intubation:     Induction:  Inhalational - mask    Intubated:  Postinduction    Mask Ventilation:  Easy mask    Attempts:  1    Attempted By:  CRNA    Method of Intubation:  Video laryngoscopy    Blade:  Faye 4    Laryngeal View Grade: Grade IIA - cords partially seen      Difficult Airway Encountered?: No      Complications:  None    Airway Device:  Oral endotracheal tube    Airway Device Size:  7.5    Style/Cuff Inflation:  Cuffed (inflated to minimal occlusive pressure)    Tube secured:  23    Secured at:  The lips    Placement Verified By:  Capnometry    Complicating Factors:  None    Findings Post-Intubation:  BS equal bilateral       Latest Reference Range & Units 06/25/24 14:43   Sodium 136 - 145 mmol/L 131 (L)   Potassium 3.5 - 5.1 mmol/L 5.5 (H)   Chloride 95 - 110 mmol/L 97   CO2 23 - 29 mmol/L 23   Anion Gap 8 - 16 mmol/L 11   BUN 8 - 23 mg/dL 40 (H)   Creatinine 0.5 - 1.4 mg/dL 2.7 (H)   (L): Data is abnormally low  (H): Data is abnormally high

## 2024-06-26 NOTE — PLAN OF CARE
History     Chief Complaint   Patient presents with     Otalgia     c/o left ear pain since friday. States couldn't sleep last night due to pain. Rates pain 5/10.      HPI  Adelfo Benjamin is a 42 year old male who presents with left ear pain. He had his ear flushed out yesterday at home using OTC cleaning kit. His ear was painful before the OTC wash out and reports the pain is still there. He removed (3) large cerumen masses. Adelfo does not use qtips.  He has a h/o increased cerumen. He is reporting decreased hearing with his left ear. No drainage. No f/c. No nasal congestion    Allergies:  No Known Allergies    Problem List:    Patient Active Problem List    Diagnosis Date Noted     ACP (advance care planning) 05/06/2016     Priority: Medium     Advance Care Planning 5/6/2016: ACP Review of Chart / Resources Provided:  Reviewed chart for advance care plan.  Adelfo Benjamin has no plan or code status on file. Discussed available resources and provided with information. Confirmed code status reflects current choices pending further ACP discussions.  Confirmed/documented legally designated decision makers.  Added by Vivien Aranda               Cellulitis 04/29/2016     Priority: Medium     Cellulitis of leg 04/28/2016     Priority: Medium        Past Medical History:    No past medical history on file.    Past Surgical History:    Past Surgical History:   Procedure Laterality Date     CIRCUMCISION       shoulder anterior stabilization      left (provider: Max Mendoza)     wisdom teeth extracted         Family History:    Family History   Problem Relation Age of Onset     C.A.D. Unknown         family h/o     Lipids Unknown         hyperlipidemia - family h/o     Hypertension Father      Hypertension Unknown         family h/o       Social History:  Marital Status:   [2]  Social History     Tobacco Use     Smoking status: Former Smoker     Packs/day: 2.00     Years: 7.00     Pack years: 14.00     Types:  ID band applied and verified. POC and procedure reviewed with patient. Patient verbalizes understanding and has no questions at this time.      Cigarettes     Tobacco comment: quit in 2010 - no passive smoke exposure   Substance Use Topics     Alcohol use: Yes     Comment: 1 beer rarely     Drug use: Not on file        Medications:    IBUPROFEN PO  order for DME          Review of Systems   Constitutional: Negative for chills and fever.   HENT: Positive for ear pain (left ear) and hearing loss (left side). Negative for congestion, rhinorrhea, sinus pressure, sinus pain and sore throat.    Eyes: Negative for itching.   Respiratory: Negative for shortness of breath and wheezing.    Cardiovascular: Negative for chest pain and palpitations.   Gastrointestinal: Negative for abdominal pain.       Physical Exam   BP: 159/96  Pulse: 60  Temp: 97.7  F (36.5  C)  Resp: 18  SpO2: 99 %      Physical Exam  Constitutional:       General: He is not in acute distress.     Appearance: He is not ill-appearing.   HENT:      Head: Normocephalic and atraumatic.      Right Ear: Tympanic membrane normal. There is impacted cerumen.      Left Ear: Decreased hearing noted. Tympanic membrane is retracted. Tympanic membrane is not perforated or erythematous.      Ears:      Comments: External canal is erythetamous   Neurological:      Mental Status: He is alert.         ED Course             No results found for this or any previous visit (from the past 24 hour(s)).    Medications - No data to display    Assessments & Plan (with Medical Decision Making)     I have reviewed the nursing notes.    I have reviewed the findings, diagnosis, plan and need for follow up with the patient.  # Left ear pain: Most likely due to recent aggressive ear cleaning.  No signs or symptoms of infection.  Tympanic membrane is intact.  - Referral to audiology for complete audiogram  -Oral to ENT for ear cleanout.  -To health care facility precautions given including fever, worsening pain, decreased hearing.    # HTN:  -Office will follow-up with his primary care doctor in 1 to 2 weeks for his elevated blood  pressure.    New Prescriptions    No medications on file       Final diagnoses:   Left ear pain   Decreased hearing of left ear   Impacted cerumen of right ear       7/26/2020   [facility]

## 2024-06-26 NOTE — DISCHARGE INSTRUCTIONS
Discharge instructions following Bone Marrow Aspiration / Biopsy:    Keep Bandage in place for 24 hours.  - Do not shower or take a tub bath during this time (you may sponge bathe).  - Call the nurse or physician for excessive bleeding or pain at biopsy site.  - You may take Tylenol as needed for pain unless otherwise specified by your doctor.     During procedure today you have received medication to sedate you. These medications may affect your judgment, balance, and/or coordination. Therefore, for the next 24 hours, you have the follow restrictions:  - Do not drive a car or operate heavy machinery for the rest of the day.  - Do not make legal/financial decisions, sign important papers, or drink alcohol.  - You may resume other activities as tolerated.    You can call [phone] for any problems during the hours of 8:00 AM-5:00PM.    For an emergency after 5:00 PM you can call [phone] and have the  page the Hematologist / Oncologist on call.

## 2024-06-28 ENCOUNTER — OFFICE VISIT (OUTPATIENT)
Dept: INTERNAL MEDICINE | Facility: CLINIC | Age: 69
End: 2024-06-28
Payer: MEDICARE

## 2024-06-28 ENCOUNTER — LAB VISIT (OUTPATIENT)
Dept: LAB | Facility: HOSPITAL | Age: 69
End: 2024-06-28
Attending: PHYSICIAN ASSISTANT
Payer: MEDICARE

## 2024-06-28 VITALS
BODY MASS INDEX: 24.89 KG/M2 | OXYGEN SATURATION: 99 % | DIASTOLIC BLOOD PRESSURE: 78 MMHG | WEIGHT: 187.81 LBS | HEART RATE: 48 BPM | HEIGHT: 73 IN | SYSTOLIC BLOOD PRESSURE: 138 MMHG

## 2024-06-28 DIAGNOSIS — I50.32 CHRONIC DIASTOLIC CONGESTIVE HEART FAILURE: ICD-10-CM

## 2024-06-28 DIAGNOSIS — E11.22 TYPE 2 DIABETES MELLITUS WITH STAGE 3A CHRONIC KIDNEY DISEASE, WITH LONG-TERM CURRENT USE OF INSULIN: ICD-10-CM

## 2024-06-28 DIAGNOSIS — N18.31 STAGE 3A CHRONIC KIDNEY DISEASE: ICD-10-CM

## 2024-06-28 DIAGNOSIS — Z79.4 TYPE 2 DIABETES MELLITUS WITH STAGE 3A CHRONIC KIDNEY DISEASE, WITH LONG-TERM CURRENT USE OF INSULIN: ICD-10-CM

## 2024-06-28 DIAGNOSIS — N18.31 TYPE 2 DIABETES MELLITUS WITH STAGE 3A CHRONIC KIDNEY DISEASE, WITH LONG-TERM CURRENT USE OF INSULIN: ICD-10-CM

## 2024-06-28 DIAGNOSIS — I10 ESSENTIAL (PRIMARY) HYPERTENSION: Primary | ICD-10-CM

## 2024-06-28 DIAGNOSIS — N17.9 AKI (ACUTE KIDNEY INJURY): ICD-10-CM

## 2024-06-28 LAB
ANION GAP SERPL CALC-SCNC: 9 MMOL/L (ref 8–16)
BODY SITE - BONE MARROW: NORMAL
BUN SERPL-MCNC: 43 MG/DL (ref 8–23)
CALCIUM SERPL-MCNC: 8.6 MG/DL (ref 8.7–10.5)
CHLORIDE SERPL-SCNC: 101 MMOL/L (ref 95–110)
CLINICAL DIAGNOSIS - BONE MARROW: NORMAL
CO2 SERPL-SCNC: 23 MMOL/L (ref 23–29)
COMMENT: NORMAL
CREAT SERPL-MCNC: 2.4 MG/DL (ref 0.5–1.4)
DNA/RNA EXTRACT AND HOLD RESULT: NORMAL
DNA/RNA EXTRACTION: NORMAL
EST. GFR  (NO RACE VARIABLE): 29 ML/MIN/1.73 M^2
EXHR SPECIMEN TYPE: NORMAL
FINAL PATHOLOGIC DIAGNOSIS: NORMAL
FLOW CYTOMETRY ANTIBODIES ANALYZED - BONE MARROW: NORMAL
FLOW CYTOMETRY COMMENT - BONE MARROW: NORMAL
FLOW CYTOMETRY INTERPRETATION - BONE MARROW: NORMAL
GLUCOSE SERPL-MCNC: 282 MG/DL (ref 70–110)
GROSS: NORMAL
Lab: NORMAL
MICROSCOPIC EXAM: NORMAL
POTASSIUM SERPL-SCNC: 5.1 MMOL/L (ref 3.5–5.1)
SODIUM SERPL-SCNC: 133 MMOL/L (ref 136–145)

## 2024-06-28 PROCEDURE — 1101F PT FALLS ASSESS-DOCD LE1/YR: CPT | Mod: CPTII,S$GLB,, | Performed by: INTERNAL MEDICINE

## 2024-06-28 PROCEDURE — 3075F SYST BP GE 130 - 139MM HG: CPT | Mod: CPTII,S$GLB,, | Performed by: INTERNAL MEDICINE

## 2024-06-28 PROCEDURE — 3288F FALL RISK ASSESSMENT DOCD: CPT | Mod: CPTII,S$GLB,, | Performed by: INTERNAL MEDICINE

## 2024-06-28 PROCEDURE — 1126F AMNT PAIN NOTED NONE PRSNT: CPT | Mod: CPTII,S$GLB,, | Performed by: INTERNAL MEDICINE

## 2024-06-28 PROCEDURE — 1159F MED LIST DOCD IN RCRD: CPT | Mod: CPTII,S$GLB,, | Performed by: INTERNAL MEDICINE

## 2024-06-28 PROCEDURE — 1160F RVW MEDS BY RX/DR IN RCRD: CPT | Mod: CPTII,S$GLB,, | Performed by: INTERNAL MEDICINE

## 2024-06-28 PROCEDURE — 3044F HG A1C LEVEL LT 7.0%: CPT | Mod: CPTII,S$GLB,, | Performed by: INTERNAL MEDICINE

## 2024-06-28 PROCEDURE — 3078F DIAST BP <80 MM HG: CPT | Mod: CPTII,S$GLB,, | Performed by: INTERNAL MEDICINE

## 2024-06-28 PROCEDURE — 3008F BODY MASS INDEX DOCD: CPT | Mod: CPTII,S$GLB,, | Performed by: INTERNAL MEDICINE

## 2024-06-28 PROCEDURE — 99999 PR PBB SHADOW E&M-EST. PATIENT-LVL V: CPT | Mod: PBBFAC,,, | Performed by: INTERNAL MEDICINE

## 2024-06-28 PROCEDURE — 80048 BASIC METABOLIC PNL TOTAL CA: CPT | Performed by: PHYSICIAN ASSISTANT

## 2024-06-28 PROCEDURE — 99214 OFFICE O/P EST MOD 30 MIN: CPT | Mod: S$GLB,,, | Performed by: INTERNAL MEDICINE

## 2024-06-28 PROCEDURE — 36415 COLL VENOUS BLD VENIPUNCTURE: CPT | Performed by: PHYSICIAN ASSISTANT

## 2024-06-28 PROCEDURE — 4010F ACE/ARB THERAPY RXD/TAKEN: CPT | Mod: CPTII,S$GLB,, | Performed by: INTERNAL MEDICINE

## 2024-06-28 NOTE — PROGRESS NOTES
"Subjective:       Patient ID: Ravi Zamorano is a 68 y.o. male.    Chief Complaint: Follow-up    HPI  68 y.o. male here for follow up of    Atrial fibrillation s/p CTI/PVI/PWI 6/11/2024  HFpEF  Hypertension  Hyperlipidemia   pAF (on eliquis)  PAD   IDDM  ESRD s/p kidney transplant 2016 now with CKD 3vs4   Anemia  DM2      Underwent RFA 6/11/24; started on multaq. Admitted 6/15-6/15 for acute diastolic HF.   Seen in cardiology on 6/19 and 6/25.   Lasix 40mg bid  Added hydralazine 25mg q12h on 6/19.   No cilostazole due to possible interaction w flecainide.     Home 145/57, 127/60    Lab Results   Component Value Date    HGBA1C 6.8 (H) 05/02/2024    HGBA1C 8.3 (H) 03/11/2024    HGBA1C 8.4 (H) 11/14/2023   Novolog 15 u tid meals  Lantus 20 u qam    CKD 3 vs 4  Most recent GFR 29 - improved from 25 (lasix was held at that time)  Last nephrology visit 12/2023. Scheduled 7/11/24.     Bone marrow biopsy on 6/26 for anemia.   Review of Systems   Constitutional:  Negative for fever.   Respiratory:  Negative for shortness of breath.    Cardiovascular:  Negative for chest pain.   Musculoskeletal: Negative.    Skin: Negative.        Objective:   /78   Pulse (!) 48 Comment: manual  Ht 6' 1" (1.854 m)   Wt 85.2 kg (187 lb 13.3 oz)   SpO2 99%   BMI 24.78 kg/m²      Physical Exam  Constitutional:       General: He is not in acute distress.     Appearance: He is well-developed. He is not diaphoretic.   HENT:      Head: Normocephalic and atraumatic.   Cardiovascular:      Rate and Rhythm: Normal rate and regular rhythm.      Heart sounds: No murmur heard.  Pulmonary:      Effort: Pulmonary effort is normal. No respiratory distress.      Breath sounds: No wheezing or rales.   Skin:     General: Skin is warm and dry.   Neurological:      Mental Status: He is alert and oriented to person, place, and time.   Psychiatric:         Behavior: Behavior normal.         Assessment:       1. Essential (primary) hypertension    2. " Stage 3a chronic kidney disease    3. Type 2 diabetes mellitus with stage 3a chronic kidney disease, with long-term current use of insulin    4. Chronic diastolic congestive heart failure        Plan:       Essential (primary) hypertension  Stage 3a chronic kidney disease - recent mild PHUONG due to diuretics, now held  HFpEF  DM2  -     Basic Metabolic Panel; Future; Expected date: 06/28/2024  - stable and controlled  - continue current regimen        Health Maintenance Due   Topic    Diabetes Urine Screening     Lipid Panel

## 2024-07-01 ENCOUNTER — TELEPHONE (OUTPATIENT)
Dept: CARDIOLOGY | Facility: CLINIC | Age: 69
End: 2024-07-01
Payer: MEDICARE

## 2024-07-01 ENCOUNTER — TELEPHONE (OUTPATIENT)
Dept: INTERNAL MEDICINE | Facility: CLINIC | Age: 69
End: 2024-07-01
Payer: MEDICARE

## 2024-07-01 ENCOUNTER — TELEPHONE (OUTPATIENT)
Dept: TRANSPLANT | Facility: CLINIC | Age: 69
End: 2024-07-01
Payer: MEDICARE

## 2024-07-01 ENCOUNTER — LAB VISIT (OUTPATIENT)
Dept: LAB | Facility: HOSPITAL | Age: 69
End: 2024-07-01
Payer: MEDICARE

## 2024-07-01 DIAGNOSIS — N18.31 STAGE 3A CHRONIC KIDNEY DISEASE: ICD-10-CM

## 2024-07-01 DIAGNOSIS — E11.9 TYPE 2 DIABETES MELLITUS WITHOUT COMPLICATION: ICD-10-CM

## 2024-07-01 DIAGNOSIS — Z94.0 KIDNEY REPLACED BY TRANSPLANT: ICD-10-CM

## 2024-07-01 LAB
ANION GAP SERPL CALC-SCNC: 8 MMOL/L (ref 8–16)
BUN SERPL-MCNC: 34 MG/DL (ref 8–23)
CALCIUM SERPL-MCNC: 9.1 MG/DL (ref 8.7–10.5)
CHLORIDE SERPL-SCNC: 104 MMOL/L (ref 95–110)
CHOLEST SERPL-MCNC: 112 MG/DL (ref 120–199)
CHOLEST/HDLC SERPL: 4.3 {RATIO} (ref 2–5)
CO2 SERPL-SCNC: 23 MMOL/L (ref 23–29)
CREAT SERPL-MCNC: 2.1 MG/DL (ref 0.5–1.4)
EST. GFR  (NO RACE VARIABLE): 34.1 ML/MIN/1.73 M^2
GLUCOSE SERPL-MCNC: 182 MG/DL (ref 70–110)
HDLC SERPL-MCNC: 26 MG/DL (ref 40–75)
HDLC SERPL: 23.2 % (ref 20–50)
LDLC SERPL CALC-MCNC: 48.6 MG/DL (ref 63–159)
NONHDLC SERPL-MCNC: 86 MG/DL
POTASSIUM SERPL-SCNC: 5.4 MMOL/L (ref 3.5–5.1)
SODIUM SERPL-SCNC: 135 MMOL/L (ref 136–145)
TACROLIMUS BLD-MCNC: 21.1 NG/ML (ref 5–15)
TRIGL SERPL-MCNC: 187 MG/DL (ref 30–150)

## 2024-07-01 PROCEDURE — 36415 COLL VENOUS BLD VENIPUNCTURE: CPT | Mod: HCNC | Performed by: INTERNAL MEDICINE

## 2024-07-01 PROCEDURE — 80061 LIPID PANEL: CPT | Mod: HCNC | Performed by: INTERNAL MEDICINE

## 2024-07-01 PROCEDURE — 80197 ASSAY OF TACROLIMUS: CPT | Mod: HCNC | Performed by: INTERNAL MEDICINE

## 2024-07-01 PROCEDURE — 80048 BASIC METABOLIC PNL TOTAL CA: CPT | Mod: HCNC | Performed by: INTERNAL MEDICINE

## 2024-07-01 RX ORDER — FUROSEMIDE 40 MG/1
40 TABLET ORAL DAILY
Start: 2024-07-01 | End: 2025-07-01

## 2024-07-01 NOTE — TELEPHONE ENCOUNTER
----- Message from Mima Mack MD sent at 7/1/2024 12:10 PM CDT -----  Please let pt know his kidney numbers are stable and his cholesterol levels look really good.  Sincerely,  Mima Mack MD

## 2024-07-01 NOTE — TELEPHONE ENCOUNTER
Discussed labs with patient. Renal function back to baseline. Can resume Lasix at 40 mg once daily instead of twice daily

## 2024-07-01 NOTE — TELEPHONE ENCOUNTER
Instructions were verified with ARIEL Moss, JarodD.  Spoke to patient and instructed to hold Prograf and repeat a level Thursday morning, patient is to wait for when he can restart.  Once given the ok to restart, he is to start Prograf 0.5 mg every other day.  Patient and wife verbalized understanding.       ----- Message from Bola Sosa MD sent at 7/1/2024 11:21 AM CDT -----  Ok I was going to stop for 2 days and repeat on Thursday, you suggest to stop and repeat in 1 week?Just making sure I understood the instruction. thanks  ----- Message -----  From: Sarika Moss PharmD  Sent: 7/1/2024  10:47 AM CDT  To: Bola Sosa MD; Shanika Aviles RN    With its kinetics, it should be at steady state (this should be the extent of the interaction); once he stops it I would check a level in around a week  ----- Message -----  From: Shanika Aviles, HE  Sent: 7/1/2024  10:19 AM CDT  To: Bola Sosa MD; #    Patient started taking dronedorone on 6/13/24.  He had an ablation and his cardiologist will have him on it for 3 months post procedure.  We've been checking his tacro closely and looks like it just started to affect the level.  Pt confirmed that tacro level today is an accurate 12 hour trough.

## 2024-07-03 ENCOUNTER — OFFICE VISIT (OUTPATIENT)
Dept: PODIATRY | Facility: CLINIC | Age: 69
End: 2024-07-03
Payer: MEDICARE

## 2024-07-03 VITALS
WEIGHT: 191.69 LBS | SYSTOLIC BLOOD PRESSURE: 160 MMHG | DIASTOLIC BLOOD PRESSURE: 74 MMHG | BODY MASS INDEX: 25.41 KG/M2 | HEIGHT: 73 IN | HEART RATE: 50 BPM

## 2024-07-03 DIAGNOSIS — R26.89 BALANCE PROBLEM: ICD-10-CM

## 2024-07-03 DIAGNOSIS — B35.1 ONYCHOMYCOSIS OF TOENAIL: ICD-10-CM

## 2024-07-03 DIAGNOSIS — L60.1 ONYCHOLYSIS OF TOENAIL: ICD-10-CM

## 2024-07-03 DIAGNOSIS — I73.9 CLAUDICATION OF LEFT LOWER EXTREMITY: ICD-10-CM

## 2024-07-03 DIAGNOSIS — Z94.0 TRANSPLANTED KIDNEY: ICD-10-CM

## 2024-07-03 DIAGNOSIS — Z79.01 ANTICOAGULANT LONG-TERM USE: Primary | ICD-10-CM

## 2024-07-03 DIAGNOSIS — R60.0 EDEMA OF BOTH LOWER EXTREMITIES: ICD-10-CM

## 2024-07-03 DIAGNOSIS — E11.42 DIABETIC POLYNEUROPATHY ASSOCIATED WITH TYPE 2 DIABETES MELLITUS: ICD-10-CM

## 2024-07-03 DIAGNOSIS — Q84.5 ENLARGED AND HYPERTROPHIC NAILS: ICD-10-CM

## 2024-07-03 DIAGNOSIS — B35.3 TINEA PEDIS, UNSPECIFIED LATERALITY: ICD-10-CM

## 2024-07-03 PROCEDURE — 11720 DEBRIDE NAIL 1-5: CPT | Mod: 59,Q9,HCNC,S$GLB | Performed by: PODIATRIST

## 2024-07-03 PROCEDURE — 4010F ACE/ARB THERAPY RXD/TAKEN: CPT | Mod: HCNC,CPTII,S$GLB, | Performed by: PODIATRIST

## 2024-07-03 PROCEDURE — 1126F AMNT PAIN NOTED NONE PRSNT: CPT | Mod: HCNC,CPTII,S$GLB, | Performed by: PODIATRIST

## 2024-07-03 PROCEDURE — 99203 OFFICE O/P NEW LOW 30 MIN: CPT | Mod: 25,HCNC,S$GLB, | Performed by: PODIATRIST

## 2024-07-03 PROCEDURE — 3008F BODY MASS INDEX DOCD: CPT | Mod: HCNC,CPTII,S$GLB, | Performed by: PODIATRIST

## 2024-07-03 PROCEDURE — 11719 TRIM NAIL(S) ANY NUMBER: CPT | Mod: Q9,HCNC,S$GLB, | Performed by: PODIATRIST

## 2024-07-03 PROCEDURE — 3077F SYST BP >= 140 MM HG: CPT | Mod: HCNC,CPTII,S$GLB, | Performed by: PODIATRIST

## 2024-07-03 PROCEDURE — 99999 PR PBB SHADOW E&M-EST. PATIENT-LVL V: CPT | Mod: PBBFAC,HCNC,, | Performed by: PODIATRIST

## 2024-07-03 PROCEDURE — 3066F NEPHROPATHY DOC TX: CPT | Mod: HCNC,CPTII,S$GLB, | Performed by: PODIATRIST

## 2024-07-03 PROCEDURE — 3044F HG A1C LEVEL LT 7.0%: CPT | Mod: HCNC,CPTII,S$GLB, | Performed by: PODIATRIST

## 2024-07-03 PROCEDURE — 1101F PT FALLS ASSESS-DOCD LE1/YR: CPT | Mod: HCNC,CPTII,S$GLB, | Performed by: PODIATRIST

## 2024-07-03 PROCEDURE — 3078F DIAST BP <80 MM HG: CPT | Mod: HCNC,CPTII,S$GLB, | Performed by: PODIATRIST

## 2024-07-03 PROCEDURE — 3288F FALL RISK ASSESSMENT DOCD: CPT | Mod: HCNC,CPTII,S$GLB, | Performed by: PODIATRIST

## 2024-07-03 NOTE — PROGRESS NOTES
Subjective:      Patient ID: Ravi Zamorano is a 68 y.o. male.    Chief Complaint: Nail Problem (toenails)    Ravi is a 68 y.o. male who presents new to the clinic for evaluation & tx of high risk feet. The patient's cc is long, thick toenails.  He is a diabetic.  Has peripheral neuropathy - on gabapentin.  Also has PAD.  Recent CHF w/ residual leg swelling This patient has documented high risk feet requiring routine maintenance secondary to diabetes mellitis and those secondary complications of diabetes, as mentioned.    Ambulates w/ the help of a cane d/t balance.  Retired .    PCP: Mima Mack MD    Date Last Seen by PCP:  06/28/2024    Renal transplant 2016 d/t DM & HBP.  Past Medical History:   Diagnosis Date    Anticoagulant long-term use     Anxiety 07/29/2014    Arthritis     atrial fibrillation     Bilateral diabetic retinopathy 2017    CKD (chronic kidney disease) stage 2, GFR 60-89 ml/min 12/28/2016    CKD (chronic kidney disease) stage 4, GFR 15-29 ml/min 07/29/2014    Colon polyps 2014    Depression - situational 07/29/2014    Diabetes mellitus     Diabetes type 2 since 2000 07/29/2014    Diabetic neuropathy 07/29/2014    Encounter for blood transfusion     History of cardioversion 10/03/2019    History of hepatitis C, s/p successful Rx w/ SVR24 - 9/2017 07/29/2014    Genotype 1a, treatment naive 10/2014 liver biopsy - grade 1 / stage 1 Completed Harvoni w/ SVR    Hyperlipidemia 07/29/2014    Hypertension     Neuropathy     Organ transplant candidate 07/29/2014    Pancreatitis     S/P cadaveric kidney transplant 11/5/2016. ESRD secondary to HTN/DMII 11/05/2016    Stage 3a chronic kidney disease 12/28/2016    Type 2 diabetes mellitus with stage 3a chronic kidney disease, with long-term current use of insulin 07/29/2014     Patient Active Problem List   Diagnosis    Type 2 diabetes mellitus with stage 3a chronic kidney disease, with long-term current use of insulin    Diabetic  polyneuropathy associated with type 2 diabetes mellitus    Essential (primary) hypertension    Hyperlipidemia    Depression - situational    Anxiety    Prophylactic immunotherapy    Acute on chronic heart failure    S/P cadaveric kidney transplant 2016. ESRD secondary to HTN/DMII    Adverse effect of glucocorticoid or synthetic analogue    Adverse effect of immunosuppressive drug    Immunocompromised state due to drug therapy    Pancytopenia    Stage 3 chronic kidney disease    Type 2 diabetes mellitus    Persistent atrial fibrillation    Anticoagulant long-term use    Peyronie's disease    Erectile dysfunction due to arterial insufficiency    BPH with urinary obstruction    History of colon polyps    COVID-19 virus infection    PAD (peripheral artery disease)    Claudication of both lower extremities    Paroxysmal A-fib    Immunodeficiency, unspecified    Thrombocytopenia, unspecified    H. pylori infection    Symptomatic anemia    Status post -donor kidney transplantation    Shortness of breath    Other specified anemias    Thrombocytopenia    Long term (current) use of anticoagulants      Objective:      VAS US Ankle Brachial Indices Resting  Order: [id number]  Status: Final result       Visible to patient: No (inaccessible in Patient Portal)       Next appt: 2024 at 08:00 AM in Lab (LAB, TRANSPLANT)       Dx: PAD (peripheral artery disease)    0 Result Notes  Details  Narrative    Indication  ========    PAD    Pressure Lower  ============    Rt brachial A syst BP  171 mmHg  Rt low thigh BP    151 mmHg  Rt calf  mmHg  Rt PTA BP  152 mmHg  Rt dors pedis A  mmHg  Rt toe BP  139 mmHg  Right WENDI ratio use:   post. tibial  Rt WENDI post tibial (rt post tib A BP / max brach A BP) 0.79  Rt WENDI (rt dors ped A BP / max brach A BP) 0.73  Rt ankle BP / max brach A BP   0.79  Rt TBI (rt toe BP / max brach A BP)    0.72  Lt brachial A syst BP  192 mmHg  Lt low thigh BP    194 mmHg  Lt calf   mmHg  Lt PTA BP  147 mmHg  Lt dors pedis A  mmHg  Lt toe BP  149 mmHg  Left WENDI ratio use:    post. tibial  Lt WENDI (lt post tibial A BP / max brach A BP)  0.77  Lt WENDI dors ped (lt dors ped A BP / max brach A BP)    0.69  Lt ankle BP / max brach A BP   0.77  Lt TBI (lt toe BP / max brach A BP)    0.78    PPG Lower  =========    Rt toe digit waveform: mildly to moderately dampened  Lt toe digit waveform: mildly to moderately dampened    Comment  ========    The presence of artifactually elevated pressures in arteries of the lower extremity can suggest suspected medial calcinosis; therefore,  toe pressures and toe brachial indices are obtained appropriately.  Left Brachial pressures differ by nearly greater than 20 mmHg.    Impression  =========    Right Leg: Segmental pressures and PVR waveforms suggest multi-level mild to moderate peripheral arterial occlusive disease.  Left Leg: Segmental pressures and PVR waveforms suggest multi-level mild to moderate peripheral arterial occlusive disease.    Findings consistent with aorto-iliac disease.    Rt lower digits: Toe PPG waveforms as described above. - TBI of 0.72 with a Great toe pressure of 139 mmHg is noted.  Lt lower digits: Toe PPG waveforms as described above. - TBI of 0.78 with a Great toe pressure of 149 mmHg is noted.      DATE OF SERVICE: 02/09/2023                                                      Sonographer: Yaneth Soto  Electronically Signed by: Thuan Harris M.D. at 02/09/2023-11:17     Review of Systems   Constitutional: Negative for malaise/fatigue.   Cardiovascular:  Positive for claudication and dyspnea on exertion. Negative for leg swelling.   Skin:  Positive for color change, dry skin and nail changes. Negative for itching, poor wound healing and suspicious lesions.   Musculoskeletal:  Positive for arthritis. Negative for falls, joint pain, muscle cramps, muscle weakness and myalgias.   Neurological:  Positive for focal weakness, loss  of balance, numbness, paresthesias and sensory change. Negative for weakness.   Psychiatric/Behavioral:  Positive for depression. The patient is nervous/anxious.      Physical Exam  Vitals reviewed.   Constitutional:       General: He is not in acute distress.     Appearance: He is well-developed and normal weight.   Cardiovascular:      Pulses:           Dorsalis pedis pulses are 1+ on the right side and 1+ on the left side.   Musculoskeletal:         General: Swelling present. No tenderness or signs of injury.      Right lower leg: Edema present.      Left lower leg: Edema present.   Feet:      Right foot:      Skin integrity: Skin integrity normal.      Toenail Condition: Right toenails are long. Fungal disease present.     Left foot:      Skin integrity: Skin integrity normal.      Toenail Condition: Left toenails are long. Fungal disease present.     Comments: Toenails 1st B/L, 2nd R are thickened, dystrophic, discolored, Tender to distal nail plate pressure, w/out periungual skin abnormality noted. R great toenail also shows transverse lysis. Remaining nails also hypertrophic.     Skin:     General: Skin is warm and dry.      Capillary Refill: Capillary refill takes 2 to 3 seconds.      Findings: No bruising, erythema or lesion.      Nails: There is no clubbing.   Neurological:      Mental Status: He is alert and oriented to person, place, and time.      Sensory: Sensory deficit present.      Motor: Motor function is intact. No weakness or abnormal muscle tone.      Gait: Gait abnormal (cane for balance).   Psychiatric:         Mood and Affect: Mood and affect normal.         Behavior: Behavior normal. Behavior is cooperative.     R still has saw there is a wet house at the dried house the dog house get already in a hope all oblique neck some get get a letter was March issues clamp all that H&E be then the 1st place the    Assessment:      Encounter Diagnoses   Name Primary?    Diabetic polyneuropathy  associated with type 2 diabetes mellitus     Anticoagulant long-term use Yes    Transplanted kidney     Claudication of left lower extremity     Onycholysis of toenail     Onychomycosis of toenail     Edema of both lower extremities     Balance problem     Tinea pedis, unspecified laterality     Enlarged and hypertrophic nails       Problem List Items Addressed This Visit          Neuro    Diabetic polyneuropathy associated with type 2 diabetes mellitus    Relevant Orders    Nail debridement    DM nail trimming       Hematology    Anticoagulant long-term use - Primary    Relevant Orders    Nail debridement    DM nail trimming     Other Visit Diagnoses       Transplanted kidney        Claudication of left lower extremity        Relevant Orders    Nail debridement    DM nail trimming    Onycholysis of toenail        Relevant Orders    Nail debridement    Onychomycosis of toenail        Relevant Orders    Nail debridement    Edema of both lower extremities        Balance problem        Tinea pedis, unspecified laterality        Enlarged and hypertrophic nails        Relevant Orders    DM nail trimming           Plan:       Ravi was seen today for nail problem.    Diagnoses and all orders for this visit:    Anticoagulant long-term use  -     Nail debridement  -     DM nail trimming    Diabetic polyneuropathy associated with type 2 diabetes mellitus  -     Ambulatory referral/consult to Podiatry  -     Nail debridement  -     DM nail trimming    Transplanted kidney    Claudication of left lower extremity  -     Nail debridement  -     DM nail trimming    Onycholysis of toenail  -     Nail debridement    Onychomycosis of toenail  -     Nail debridement    Edema of both lower extremities    Balance problem    Tinea pedis, unspecified laterality    Enlarged and hypertrophic nails  -     DM nail trimming    I counseled the patient on his conditions, their implications & medical mgmt.    - Shoe inspection. Diabetic Foot  Education. Patient reminded of the importance of good blood sugar control to help prevent podiatric complications of diabetes. Patient instructed on proper foot hygeine. We discussed wearing proper shoe gear, daily foot inspections, never walking w/out protective shoe gear, annual or semiannual diabetic foot exam, sooner p.r.n.    - W/ patient's permission, nails were aggressively reduced & debrided x 10 to their soft tissue attachment mechanically, removing all offending nail & debris. Patient relates relief following the procedure. He will continue to monitor the areas daily, inspect his feet, wear protective shoe gear when ambulatory, moisturizer to maintain skin integrity.  Instructions provided on OTC topical antifungal Tx options for nails & skin.        A total of 35 mins.was spent on chart review, patient visit & documentation.

## 2024-07-05 ENCOUNTER — LAB VISIT (OUTPATIENT)
Dept: LAB | Facility: HOSPITAL | Age: 69
End: 2024-07-05
Attending: INTERNAL MEDICINE
Payer: MEDICARE

## 2024-07-05 DIAGNOSIS — Z94.0 KIDNEY REPLACED BY TRANSPLANT: Primary | ICD-10-CM

## 2024-07-05 DIAGNOSIS — Z94.0 KIDNEY REPLACED BY TRANSPLANT: ICD-10-CM

## 2024-07-05 LAB — TACROLIMUS BLD-MCNC: 5.2 NG/ML (ref 5–15)

## 2024-07-05 PROCEDURE — 80197 ASSAY OF TACROLIMUS: CPT | Mod: HCNC | Performed by: INTERNAL MEDICINE

## 2024-07-05 PROCEDURE — 36415 COLL VENOUS BLD VENIPUNCTURE: CPT | Mod: HCNC | Performed by: INTERNAL MEDICINE

## 2024-07-05 NOTE — TELEPHONE ENCOUNTER
"Called pt and instructed him to restart taking prograf @ 0.5mg daily every other day. Pt verbalized understanding.  ----- Message from Bola Sosa MD sent at 7/5/2024  2:29 PM CDT -----  That is fine, continue with Sarika suggestion  ----- Message -----  From: Tanna Porter RN  Sent: 7/5/2024   1:04 PM CDT  To: Bola Sosa MD    I'm covering for Jyoti today - I forwarded you a communication/plan that she and Sarika discussed.    "Instructions were verified with ARIEL Moss, JarodD.  Spoke to patient and instructed to hold Prograf and repeat a level Thursday morning, patient is to wait for when he can restart.  Once given the ok to restart, he is to start Prograf 0.5 mg every other day.  Patient and wife verbalized understanding."     Please advise what dose to restart at  ----- Message -----  From: Bola Sosa MD  Sent: 7/5/2024  11:51 AM CDT  To: [facility]    How much prograf was he taking before we held it?  "

## 2024-07-06 RX ORDER — TACROLIMUS 0.5 MG/1
0.5 CAPSULE ORAL EVERY OTHER DAY
Qty: 15 CAPSULE | Refills: 11 | Status: SHIPPED | OUTPATIENT
Start: 2024-07-06 | End: 2025-07-06

## 2024-07-10 ENCOUNTER — TELEPHONE (OUTPATIENT)
Dept: PHARMACY | Facility: CLINIC | Age: 69
End: 2024-07-10
Payer: MEDICARE

## 2024-07-10 ENCOUNTER — OFFICE VISIT (OUTPATIENT)
Dept: GASTROENTEROLOGY | Facility: CLINIC | Age: 69
End: 2024-07-10
Payer: MEDICARE

## 2024-07-10 ENCOUNTER — OFFICE VISIT (OUTPATIENT)
Dept: CARDIOLOGY | Facility: CLINIC | Age: 69
End: 2024-07-10
Payer: MEDICARE

## 2024-07-10 ENCOUNTER — ANTI-COAG VISIT (OUTPATIENT)
Dept: CARDIOLOGY | Facility: CLINIC | Age: 69
End: 2024-07-10
Payer: MEDICARE

## 2024-07-10 VITALS
SYSTOLIC BLOOD PRESSURE: 150 MMHG | WEIGHT: 190.06 LBS | OXYGEN SATURATION: 100 % | HEIGHT: 73 IN | HEART RATE: 48 BPM | DIASTOLIC BLOOD PRESSURE: 70 MMHG | BODY MASS INDEX: 25.19 KG/M2

## 2024-07-10 VITALS
HEART RATE: 49 BPM | DIASTOLIC BLOOD PRESSURE: 77 MMHG | SYSTOLIC BLOOD PRESSURE: 162 MMHG | BODY MASS INDEX: 25.19 KG/M2 | WEIGHT: 190.06 LBS | HEIGHT: 73 IN

## 2024-07-10 DIAGNOSIS — I48.19 PERSISTENT ATRIAL FIBRILLATION: Primary | ICD-10-CM

## 2024-07-10 DIAGNOSIS — E78.2 MIXED HYPERLIPIDEMIA: ICD-10-CM

## 2024-07-10 DIAGNOSIS — I73.9 PAD (PERIPHERAL ARTERY DISEASE): ICD-10-CM

## 2024-07-10 DIAGNOSIS — I10 ESSENTIAL (PRIMARY) HYPERTENSION: ICD-10-CM

## 2024-07-10 DIAGNOSIS — N18.31 STAGE 3A CHRONIC KIDNEY DISEASE: ICD-10-CM

## 2024-07-10 DIAGNOSIS — Z79.4 TYPE 2 DIABETES MELLITUS WITH STAGE 3A CHRONIC KIDNEY DISEASE, WITH LONG-TERM CURRENT USE OF INSULIN: ICD-10-CM

## 2024-07-10 DIAGNOSIS — N18.31 TYPE 2 DIABETES MELLITUS WITH STAGE 3A CHRONIC KIDNEY DISEASE, WITH LONG-TERM CURRENT USE OF INSULIN: ICD-10-CM

## 2024-07-10 DIAGNOSIS — E11.22 TYPE 2 DIABETES MELLITUS WITH STAGE 3A CHRONIC KIDNEY DISEASE, WITH LONG-TERM CURRENT USE OF INSULIN: ICD-10-CM

## 2024-07-10 DIAGNOSIS — I50.32 CHRONIC DIASTOLIC HEART FAILURE: Primary | ICD-10-CM

## 2024-07-10 DIAGNOSIS — I73.9 CLAUDICATION OF BOTH LOWER EXTREMITIES: ICD-10-CM

## 2024-07-10 DIAGNOSIS — Z94.0 S/P KIDNEY TRANSPLANT: ICD-10-CM

## 2024-07-10 DIAGNOSIS — Z79.01 LONG TERM (CURRENT) USE OF ANTICOAGULANTS: ICD-10-CM

## 2024-07-10 DIAGNOSIS — A04.8 H. PYLORI INFECTION: ICD-10-CM

## 2024-07-10 DIAGNOSIS — I48.19 PERSISTENT ATRIAL FIBRILLATION: ICD-10-CM

## 2024-07-10 PROCEDURE — 4010F ACE/ARB THERAPY RXD/TAKEN: CPT | Mod: HCNC,CPTII,S$GLB, | Performed by: PHYSICIAN ASSISTANT

## 2024-07-10 PROCEDURE — 3077F SYST BP >= 140 MM HG: CPT | Mod: HCNC,CPTII,S$GLB, | Performed by: PHYSICIAN ASSISTANT

## 2024-07-10 PROCEDURE — 99999 PR PBB SHADOW E&M-EST. PATIENT-LVL V: CPT | Mod: PBBFAC,HCNC,, | Performed by: PHYSICIAN ASSISTANT

## 2024-07-10 PROCEDURE — 99999 PR PBB SHADOW E&M-EST. PATIENT-LVL V: CPT | Mod: PBBFAC,HCNC,, | Performed by: INTERNAL MEDICINE

## 2024-07-10 PROCEDURE — 3078F DIAST BP <80 MM HG: CPT | Mod: HCNC,CPTII,S$GLB, | Performed by: PHYSICIAN ASSISTANT

## 2024-07-10 PROCEDURE — 99214 OFFICE O/P EST MOD 30 MIN: CPT | Mod: HCNC,S$GLB,, | Performed by: PHYSICIAN ASSISTANT

## 2024-07-10 PROCEDURE — 1159F MED LIST DOCD IN RCRD: CPT | Mod: HCNC,CPTII,S$GLB, | Performed by: PHYSICIAN ASSISTANT

## 2024-07-10 PROCEDURE — 3008F BODY MASS INDEX DOCD: CPT | Mod: HCNC,CPTII,S$GLB, | Performed by: PHYSICIAN ASSISTANT

## 2024-07-10 PROCEDURE — 3288F FALL RISK ASSESSMENT DOCD: CPT | Mod: HCNC,CPTII,S$GLB, | Performed by: PHYSICIAN ASSISTANT

## 2024-07-10 PROCEDURE — 1101F PT FALLS ASSESS-DOCD LE1/YR: CPT | Mod: HCNC,CPTII,S$GLB, | Performed by: PHYSICIAN ASSISTANT

## 2024-07-10 PROCEDURE — 3044F HG A1C LEVEL LT 7.0%: CPT | Mod: HCNC,CPTII,S$GLB, | Performed by: PHYSICIAN ASSISTANT

## 2024-07-10 PROCEDURE — 1160F RVW MEDS BY RX/DR IN RCRD: CPT | Mod: HCNC,CPTII,S$GLB, | Performed by: PHYSICIAN ASSISTANT

## 2024-07-10 PROCEDURE — 1126F AMNT PAIN NOTED NONE PRSNT: CPT | Mod: HCNC,CPTII,S$GLB, | Performed by: PHYSICIAN ASSISTANT

## 2024-07-10 RX ORDER — HYDRALAZINE HYDROCHLORIDE 50 MG/1
50 TABLET, FILM COATED ORAL EVERY 8 HOURS
Qty: 90 TABLET | Refills: 11 | Status: SHIPPED | OUTPATIENT
Start: 2024-07-10 | End: 2025-07-10

## 2024-07-10 RX ORDER — VALSARTAN 320 MG/1
320 TABLET ORAL DAILY
Qty: 90 TABLET | Refills: 3 | Status: SHIPPED | OUTPATIENT
Start: 2024-07-10 | End: 2025-07-05

## 2024-07-10 RX ORDER — WARFARIN SODIUM 5 MG/1
5 TABLET ORAL DAILY
Qty: 30 TABLET | Refills: 11 | Status: SHIPPED | OUTPATIENT
Start: 2024-07-10 | End: 2025-07-10

## 2024-07-10 NOTE — Clinical Note
GI MA team Please schedule patient for stool for H pylori antigen to be turned in in 2 weeks 2nd floor [facility]  Return GI clinic 8 weeks for follow-up okay for telemedicine video visit

## 2024-07-10 NOTE — PROGRESS NOTES
General Cardiology Clinic Note  Reason for Visit: 3 week follow up   Last Clinic Visit: 6/19/2024    HPI:   Ravi Zamorano is a 68 y.o. male who presents for 3 week follow up.     Problems:  Atrial fibrillation s/p CTI/PVI/PWI 6/11/2024  HFpEF  Hypertension  Hyperlipidemia   PAD  IDDM  ESRD s/p kidney transplant 2016   Anemia    Interval HPI  Patient presents for 3 week follow up. He is feeling better since last visit. Swelling has improved significantly. He will still get some mild edema in ankles on and off, but no longer staying swollen. He denies DOSS, orthopnea, PND, CP, palpitations, lightheadedness, syncope. He does have some claudication in L calf with ambulation, but can walk at least 2 blocks.     6/19/2024 HPI  Patient underwent RFA 6/11/2024. He was started on Multaq. He was then admitted 6/15-6/16 for ADHF. He was diuresed with IV Lasix. He was discharged on higher dose of Lasix and restarted on Spironolactone.     Patient presents for follow up. States he is not feeling any better. He still has leg swelling. He has been very sedentary since ablation, so unclear if he is still having dyspnea. He denies orthopnea and PND. His weight at home is up a couple pounds since hospital discharge. He reports being on a low sodium diet.     5/21/2024  Patient was admitted 5/2-5/3 for ADHF. Presented to the ED with DOSS, orthopnea, edema. In ED, /96. CXR with left sided pleural effusion. BNP >4,900. Troponin 0.049. Hgb 7.9 (at baseline for April). Required brief IV diuresis with improvement. BP was uncontrolled during the hospital possibly contributing to volume overload.  Changes were made to his medications including up titration of valsartan and Coreg, HCTZ was discontinued.  TTE was obtained.  Was in atrial fibrillation during study, which showed EF 50-55%. Unable to assess diastolic function due to atrial fibrillation.      Patient presents for follow up. He still has mild edema. He reports DOSS  with mild exertion, such as walking short distances or taking out the trash. He has to walk very slowly. Symptoms are significantly improved from when he presented to the ED, but not normal for him. He denies chest pain, orthopnea, PND, lightheadedness, syncope. He reports palpitations. He is not doing daily weights or checking BP consistently at home. He has two BP cuffs but they give completely different readings. He is on a low sodium diet. He was not discharged on Lasix after the hospitalization, so he was just prescribed it about a week ago. He is also supposed to be taking Coreg 50 mg bid, but is on 25 mg bid.     ROS:      Review of Systems   Constitutional: Negative for diaphoresis, malaise/fatigue, weight gain and weight loss.   HENT:  Negative for nosebleeds.    Eyes:  Negative for vision loss in left eye, vision loss in right eye and visual disturbance.   Cardiovascular:  Positive for leg swelling. Negative for chest pain, claudication, dyspnea on exertion, irregular heartbeat, near-syncope, orthopnea, palpitations, paroxysmal nocturnal dyspnea and syncope.   Respiratory:  Negative for cough, shortness of breath, sleep disturbances due to breathing, snoring and wheezing.    Hematologic/Lymphatic: Negative for bleeding problem. Does not bruise/bleed easily.   Skin:  Negative for poor wound healing and rash.   Musculoskeletal:  Negative for muscle cramps and myalgias.   Gastrointestinal:  Negative for bloating, abdominal pain, diarrhea, heartburn, melena, nausea and vomiting.   Genitourinary:  Negative for hematuria and nocturia.   Neurological:  Negative for brief paralysis, dizziness, headaches, light-headedness, numbness and weakness.   Psychiatric/Behavioral:  Negative for depression.    Allergic/Immunologic: Negative for hives.       PMH:     Past Medical History:   Diagnosis Date    Anticoagulant long-term use     Anxiety 07/29/2014    Arthritis     atrial fibrillation     Bilateral diabetic  retinopathy 2017    CKD (chronic kidney disease) stage 2, GFR 60-89 ml/min 12/28/2016    CKD (chronic kidney disease) stage 4, GFR 15-29 ml/min 07/29/2014    Colon polyps 2014    Depression - situational 07/29/2014    Diabetes mellitus     Diabetes type 2 since 2000 07/29/2014    Diabetic neuropathy 07/29/2014    Encounter for blood transfusion     History of cardioversion 10/03/2019    History of hepatitis C, s/p successful Rx w/ SVR24 - 9/2017 07/29/2014    Genotype 1a, treatment naive 10/2014 liver biopsy - grade 1 / stage 1 Completed Harvoni w/ SVR    Hyperlipidemia 07/29/2014    Hypertension     Neuropathy     Organ transplant candidate 07/29/2014    Pancreatitis     S/P cadaveric kidney transplant 11/5/2016. ESRD secondary to HTN/DMII 11/05/2016    Stage 3a chronic kidney disease 12/28/2016    Type 2 diabetes mellitus with stage 3a chronic kidney disease, with long-term current use of insulin 07/29/2014     Past Surgical History:   Procedure Laterality Date    ABLATION OF ARRHYTHMOGENIC FOCUS FOR ATRIAL FIBRILLATION N/A 6/11/2024    Procedure: Ablation atrial fibrillation;  Surgeon: Bronson Bowden MD;  Location: [facility] EP LAB;  Service: Cardiology;  Laterality: N/A;  AF, FELICIANO (cx if SR), PVI, RFA, Carto, Gen, GP, 3 Prep    ABLATION, ATRIAL FLUTTER, TYPICAL  6/11/2024    Procedure: Ablation, Atrial Flutter, Typical;  Surgeon: Bronson Bowden MD;  Location: [facility] EP LAB;  Service: Cardiology;;    APPENDECTOMY      BONE MARROW BIOPSY Left 6/26/2024    Procedure: Biopsy-bone marrow;  Surgeon: Bridger Zapata MD;  Location: [facility] ENDO ([address] [place] [address]);  Service: Oncology;  Laterality: Left;  6/24-pt knows to hold aspirin and eliquis as instructed by hem/onc, precall complete-Kpvt    CARDIOVERSION  6/11/2024    Procedure: Cardioversion;  Surgeon: Bronson Bowden MD;  Location: [facility] EP LAB;  Service: Cardiology;;    CATARACT EXTRACTION W/  INTRAOCULAR LENS IMPLANT Right 3/13/2024    Procedure: EXTRACTION, CATARACT, WITH  IOL INSERTION;  Surgeon: Jennifer Palacio MD;  Location: [facility] OR;  Service: Ophthalmology;  Laterality: Right;    CATARACT EXTRACTION W/  INTRAOCULAR LENS IMPLANT Left 4/10/2024    Procedure: EXTRACTION, CATARACT, WITH IOL INSERTION;  Surgeon: Jennifer Palacio MD;  Location: [facility] OR;  Service: Ophthalmology;  Laterality: Left;    COLONOSCOPY N/A 12/21/2020    Procedure: COLONOSCOPY;  Surgeon: Tico Bell MD;  Location: [facility] ENDO (4TH FLR);  Service: Endoscopy;  Laterality: N/A;  ok to hold eliquis per Dr. Valadez, see telephone encounter 11/13/2020-[place]  covid test 12/18 [place]    KIDNEY TRANSPLANT      TRANSESOPHAGEAL ECHOCARDIOGRAPHY N/A 8/26/2019    Procedure: ECHOCARDIOGRAM, TRANSESOPHAGEAL;  Surgeon: Dolores Diagnostic Provider;  Location: [facility] EP LAB;  Service: Cardiology;  Laterality: N/A;    TRANSESOPHAGEAL ECHOCARDIOGRAPHY N/A 6/11/2024    Procedure: ECHOCARDIOGRAM, TRANSESOPHAGEAL;  Surgeon: Grayson Lin III, MD;  Location: [facility] EP LAB;  Service: Cardiology;  Laterality: N/A;    TREATMENT OF CARDIAC ARRHYTHMIA N/A 8/26/2019    Procedure: CARDIOVERSION;  Surgeon: Bronson Bowden MD;  Location: [facility] EP LAB;  Service: Cardiology;  Laterality: N/A;  AF, FELICIANO, DCCV, MAC, GP, 3 PREP     Allergies:     Review of patient's allergies indicates:   Allergen Reactions    Nifedipine Other (See Comments)     Gingival hyperplasia     Medications:     Current Outpatient Medications on File Prior to Visit   Medication Sig Dispense Refill    apixaban (ELIQUIS) 5 mg Tab Take 1 tablet (5 mg total) by mouth 2 (two) times daily. 180 tablet 3    aspirin 81 MG Chew Take 81 mg by mouth once daily.      atorvastatin (LIPITOR) 40 MG tablet Take 1 tablet (40 mg total) by mouth once daily. 90 tablet 3    blood sugar diagnostic Strp Use to check blood glucose 1 times daily, to use with insurance preferred meter 100 each 11    blood-glucose meter Misc Use to check blood glucose 1 times daily, to use with insurance preferred meter  1 each 0    blood-glucose sensor Kayla Gin 3, use as directed, change every 14 days , e 11.65 2 each 11    carvediloL (COREG) 25 MG tablet Take 1 tablet (25 mg total) by mouth 2 (two) times daily. 180 tablet 3    dronedarone (MULTAQ) 400 mg Tab Take 1 tablet (400 mg total) by mouth 2 (two) times daily with meals. 60 tablet 11    empagliflozin (JARDIANCE) 10 mg tablet Take 1 tablet (10 mg total) by mouth once daily. 30 tablet 11    furosemide (LASIX) 40 MG tablet Take 1 tablet (40 mg total) by mouth once daily.      gabapentin (NEURONTIN) 300 MG capsule Take 1 capsule by mouth in the morning, 1 capsule midday, and 3 capsules at night. 450 capsule 3    hydrALAZINE (APRESOLINE) 25 MG tablet Take 1 tablet (25 mg total) by mouth every 12 (twelve) hours. 60 tablet 11    insulin aspart U-100 (NOVOLOG) 100 unit/mL (3 mL) InPn pen Inject 16 units w/ breakfast, 10 units at lunch and dinner scale 180-230+2, 231-280+4, 281-330+6, 331-380+8, >380+10.  *Max daily 66 units.* (Patient taking differently: Inject 15 Units into the skin 3 (three) times daily with meals. Inject 16 units w/ breakfast, 10 units at lunch and dinner scale 180-230+2, 231-280+4, 281-330+6, 331-380+8, >380+10.  *Max daily 66 units.*) 30 mL 6    insulin glargine U-100, Lantus, (LANTUS SOLOSTAR U-100 INSULIN) 100 unit/mL (3 mL) InPn pen Inject 20 Units into the skin every morning. 15 mL 6    lancets 30 gauge Misc Use to check blood glucose 1 times daily, to use with insurance preferred meter 100 each 3    magnesium oxide (MAG-OX) 400 mg (241.3 mg magnesium) tablet Take 1 tablet (400 mg total) by mouth 2 (two) times daily. 60 tablet 6    meclizine (ANTIVERT) 25 mg tablet Take 1 tablet (25 mg total) by mouth 3 (three) times daily as needed for Dizziness. 21 tablet 3    mycophenolate (CELLCEPT) 250 mg Cap Take 2 capsules (500 mg total) by mouth 2 (two) times daily. 120 capsule 11    pantoprazole (PROTONIX) 40 MG tablet Take 1 tablet (40 mg total) by mouth once  "daily. 30 tablet 0    pen needle, diabetic (BD ULTRA-FINE LO PEN NEEDLE) 32 gauge x 5/32" Ndle USE TO ADMINISTER INSULIN 4 TIMES DAILY. 400 each 3    pen needle, diabetic (BD ULTRA-FINE LO PEN NEEDLE) 32 gauge x 5/32" Ndle use four times a day 100 each 11    predniSONE (DELTASONE) 5 MG tablet Take 1 tablet (5 mg total) by mouth once daily. 30 tablet 11    tacrolimus (PROGRAF) 0.5 MG Cap Take 1 capsule (0.5 mg total) by mouth every OTHER day. 15 capsule 11    valsartan (DIOVAN) 320 MG tablet Take 1 tablet (320 mg total) by mouth once daily. 60 tablet 0    [DISCONTINUED] insulin lispro (HUMALOG KWIKPEN INSULIN) 100 unit/mL pen Inject 18 units w/ breakfast, 16 units w/ lunch and dinner plus scale 150-200 +2, 201-250 +4, 251-300 +6, 301-350 +8. 30 mL 4     Current Facility-Administered Medications on File Prior to Visit   Medication Dose Route Frequency Provider Last Rate Last Admin    balanced salt irrigation intra-ocular solution 1 drop  1 drop Right Eye On Call Procedure Jennifer Palacio MD        phenylephrine HCL 10% ophthalmic solution 1 drop  1 drop Right Eye PRN Jennifer Palacio MD        proparacaine 0.5 % ophthalmic solution 1 drop  1 drop Right Eye Daily PRN Jennifer Palacio MD        sodium chloride 0.9% flush 10 mL  10 mL Intravenous PRN Jennifer Palacio MD        TETRAcaine HCl (PF) 0.5 % Drop 1 drop  1 drop Right Eye PRN Jennifer Palacio MD         Social History:     Social History     Tobacco Use    Smoking status: Former     Current packs/day: 0.00     Average packs/day: 0.5 packs/day for 32.0 years (16.0 ttl pk-yrs)     Types: Cigarettes     Start date: 1984     Quit date: 2016     Years since quittin.6    Smokeless tobacco: Never    Tobacco comments:     Pt reports that he quit in , but started up again in . pt reports he is currently working on quitting again   Substance Use Topics    Alcohol use: Yes     Comment: Pt reports occasional beers on Sundays. Pt " "reports drinking daily prior to ESRD diagnosis.     Family History:     Family History   Problem Relation Name Age of Onset    Diabetes Mother      Hypertension Mother      Heart disease Mother          CAD with PCI    Heart disease Father      Cancer Brother 2         one sister with breast cancer    Hypertension Brother 2         one sister with HTN and borderline DM    Stroke Neg Hx      Kidney disease Neg Hx      Colon cancer Neg Hx      Esophageal cancer Neg Hx       Physical Exam:   BP (!) 150/70   Pulse (!) 48   Ht 6' 1" (1.854 m)   Wt 86.2 kg (190 lb 0.6 oz)   SpO2 100%   BMI 25.07 kg/m²        Physical Exam  Vitals and nursing note reviewed.   Constitutional:       General: He is not in acute distress.     Appearance: Normal appearance.   HENT:      Head: Normocephalic and atraumatic.      Nose: Nose normal.   Eyes:      General: No scleral icterus.     Extraocular Movements: Extraocular movements intact.      Conjunctiva/sclera: Conjunctivae normal.   Neck:      Thyroid: No thyromegaly.      Vascular: No carotid bruit or JVD.   Cardiovascular:      Rate and Rhythm: Normal rate and regular rhythm.      Pulses: Normal pulses.      Heart sounds: Normal heart sounds. No murmur heard.     No friction rub. No gallop.   Pulmonary:      Effort: Pulmonary effort is normal.      Breath sounds: Normal breath sounds. No wheezing, rhonchi or rales.   Chest:      Chest wall: No tenderness.   Abdominal:      General: Bowel sounds are normal. There is no distension.      Palpations: Abdomen is soft.      Tenderness: There is no abdominal tenderness.   Musculoskeletal:      Cervical back: Neck supple.      Right lower leg: No edema.      Left lower leg: No edema.   Skin:     General: Skin is warm and dry.      Coloration: Skin is not pale.      Findings: No erythema or rash.      Nails: There is no clubbing.   Neurological:      Mental Status: He is alert and oriented to person, place, and time.   Psychiatric:         " Mood and Affect: Mood and affect normal.         Behavior: Behavior normal.          Labs:     Lab Results   Component Value Date     (L) 07/01/2024    K 5.4 (H) 07/01/2024     07/01/2024    CO2 23 07/01/2024    BUN 34 (H) 07/01/2024    CREATININE 2.1 (H) 07/01/2024    ANIONGAP 8 07/01/2024     Lab Results   Component Value Date    HGBA1C 6.8 (H) 05/02/2024     Lab Results   Component Value Date     (H) 06/25/2024    BNP 3,203 (H) 06/15/2024    BNP 3,313 (H) 06/04/2024    Lab Results   Component Value Date    WBC 6.35 06/16/2024    HGB 7.9 (L) 06/16/2024    HCT 29.4 (L) 06/16/2024    HCT 30 (L) 06/15/2024     (L) 06/16/2024    GRAN 4.4 06/16/2024    GRAN 69.7 06/16/2024     Lab Results   Component Value Date    CHOL 112 (L) 07/01/2024    HDL 26 (L) 07/01/2024    LDLCALC 48.6 (L) 07/01/2024    TRIG 187 (H) 07/01/2024          Imaging:   Echocardiograms:   TTE 6/16/2024    Left Ventricle: The left ventricle is normal in size. Ventricular mass is normal. Mild septal thickening. Regional wall motion abnormalities present. See diagram for wall motion findings. Septal motion is abnormal. There is normal systolic function with a visually estimated ejection fraction of 55 - 60%. Ejection fraction by visual approximation is 58%.    Right Ventricle: Normal right ventricular cavity size. Wall thickness is normal. Systolic function is normal.    Tricuspid Valve: There is mild regurgitation.    Pulmonary Artery: There is severe pulmonary hypertension. The estimated pulmonary artery systolic pressure is 72 mmHg.    IVC/SVC: Normal venous pressure at 3 mmHg.    Pericardium: There is a small posterior effusion at base and none to trivial otherwise.    TTE 5/3/2024    Irregularly irregular rhythm was present during the study    Left Ventricle: The left ventricle is normal in size. Ventricular mass is normal. Normal wall thickness. Normal wall motion. There is low normal systolic function with a visually  estimated ejection fraction of 50 - 55%. Unable to assess diastolic function due to atrial fibrillation. Singnificant beat to beat variabilty due  to AF.    Left Ventricle: Unable to assess diastolic function due to atrial fibrillation. Normal left ventricular filling pressure.    Right Ventricle: Normal right ventricular cavity size. Wall thickness is normal. Systolic function is normal.    Left Atrium: Left atrium is severely dilated.    Right Atrium: Right atrium is mildly dilated.    Aortic Valve: The aortic valve is a bicuspid valve. There is mild aortic valve sclerosis. There is mild annular calcification present. There is mild aortic regurgitation.    Mitral Valve: There is mild regurgitation.    Aorta: Aortic root is mildly dilated measuring 3.69 cm. Ascending aorta is normal measuring 3.44 cm.    Pulmonary Artery: The estimated pulmonary artery systolic pressure is 42 mmHg.    IVC/SVC: Intermediate venous pressure at 8 mmHg.    Pericardium: There is a small posterior effusion. No indication of cardiac tamponade. Left pleural effusion.    TTE 7/11/2022  The left ventricle is normal in size with eccentric hypertrophy and normal systolic function. The estimated ejection fraction is 65%.  Normal right ventricular size with normal right ventricular systolic function.  Left ventricular diastolic dysfunction.  Biatrial enlargement.  Mild aortic regurgitation.  PA systolic pressure is at elast 39 mmHg. The IVC is not visualized.    Stress Tests:   Nuclear stress test 5/15/2023    Normal myocardial perfusion scan. There is no evidence of myocardial ischemia or infarction.    The visually estimated ejection fraction is normal at rest and normal during stress.    There is normal wall motion at rest and post stress.    LV cavity size is normal at rest and normal at stress.    The ECG portion of the study is abnormal but not diagnostic due to resting ST-T abnormalities.    The patient reported no chest pain during the  stress test.    There were no arrhythmias during stress.    There are no prior studies for comparison.    Caths:   None    Other:  Lower extremity arterial ultrasound 2/1/2024    Right resting WENDI 0.65, is suggestive of moderate right lower extremity arterial disease.    There is a short-segment occlusion at the mid SFA, followed by high-grade stenosis in the distal segment.    The right anterior tibial artery is occluded in the mid segment.    Left resting WENDI 0.70, is suggestive of moderate left lower extremity arterial disease.    The left SFA is occluded in the proximal/mid segment, with distal reconstitution.    The left anterior tibial artery is occluded in the mid segment.    48 Hour Holter Monitor 4/17/2023  Baseline rhythm was sinus bradycardia with first degree AV block and an average heart rate of 55 bpm.  There were no reported symptoms.  There were very rare PVCs with an overall PVC burden of 0.1%. There were frequent PACs with an overall PAC burden of 1.4%.  There were asymptomatic pauses of up to 2.1 seconds in duration during sleep with no evidence of high-degree AV block.  There were no atrial or ventricular arrhythmias.    ABIs 12/9/2022  Moderately decreased right WENDI, 0.69.    Mildly decreased left WENDI, 0.71.    Moderately dampened PVR waveforms bilaterally.      Assessment:     1. Chronic diastolic heart failure    2. Persistent atrial fibrillation    3. Essential (primary) hypertension    4. Mixed hyperlipidemia    5. PAD (peripheral artery disease)    6. S/P cadaveric kidney transplant 11/5/2016. ESRD secondary to HTN/DMII    7. Stage 3a chronic kidney disease    8. Type 2 diabetes mellitus with stage 3a chronic kidney disease, with long-term current use of insulin    9. Claudication of both lower extremities      Plan:     Chronic diastolic CHF  Compensated   Continue Lasix 40 mg daily   Continue Spironolactone 25 mg daily   Continue Jardiance 10 mg daily     Persistent Afib s/p RFA  Continue  Eduardo, Coreg  He states he cannot afford the Eliquis anymore. Will switch to Coumadin.   Following with EP     Hypertension  Uncontrolled  Increase Hydralazine to 50 mg tid.    Continue Lasix   Continue Valsartan 320 mg daily   Continue Coreg 25 mg bid  Continue Spironolactone 25 mg daily   Has allergy to Nifedipine. Caused gingival hyperplasia.     Hyperlipidemia   LDL at goal. Continue Lipitor 40 mg daily     PAD  Following with Dr. Gottlieb    S/p kidney transplant  CKD Stage 3  Stable    Type 2 DM   Well controlled.     Follow up in 3 months    Signed:  Nayely Vital PA-C  Cardiology   [phone] - General

## 2024-07-10 NOTE — PROGRESS NOTES
"GENERAL GI PATIENT INTAKE:    COVID symptoms in the last 7 days (runny nose, sore throat, congestion, cough, fever): No  PCP: Mima Mack  If not PCP-  number given to establish [phone]: No    ALLERGIES REVIEWED:  Yes    CHIEF COMPLAINT:    Chief Complaint   Patient presents with    Initial Visit     Follow up on Hpylori        VITAL SIGNS:  Ht 6' 1" (1.854 m)   Wt 86.2 kg (190 lb 0.6 oz)   BMI 25.07 kg/m²      Change in medical, surgical, family or social history: No      REVIEWED MEDICATION LIST RECONCILED INCLUDING ABOVE MEDS:  Yes     "

## 2024-07-10 NOTE — PROGRESS NOTES
Spoke to patient regarding enrollment in the [facility]madin [facility]. Per patient, he is concerned about all the changes required while taking warfarin. He will contact his MD regarding alternatives to warfarin, and contact the CC with a decision regarding warfarin therapy.

## 2024-07-10 NOTE — TELEPHONE ENCOUNTER
We have reached out to Maxwell Jaun to inform him of the [employer]  application process for Eliquis and whats required to apply.  He did not answer. Voicemail was left,  voicemail left .   Follow-up will be made in 5 business days.    Eleanor Prasad  Pharmacy Patient Assistance Team

## 2024-07-10 NOTE — PROGRESS NOTES
[facility] Consultation Note    Reason for Consult:  The encounter diagnosis was H. pylori infection.    PCP:   Mima Mack   [address] / [place] [id number]    Referring MD:  Madonna Fuller Md  [id number] [id number]    Initial History of Present Illness (HPI):  This is a 68 y.o. male here for evaluation of history of H pylori patient had a stool antigen for H pylori those positive treated with H pylori medication he has not had a confirmation test he says he completed his therapy over 6 weeks ago he has only on an H2 blocker he knows he has to be off all antibiotics and Pepto-Bismol for 4 weeks and off all H2 blockers for 2 weeks and off PPIs for 2 weeks prior to stool for H pylori antigen test can do that in 2 weeks from today once he gets off of his H2 blocker no family history of any GI malignancies no abdominal pain no diarrhea no constipation no change in bowel habits colonoscopy is up-to-date no dysphagia no odynophagia no early satiety nobody in the family with stomach cancer esophageal or small-bowel cancer nobody with colon cancer nobody with pancreatitis or pancreatic cancer nobody with small-bowel cancer no early satiety no abdominal pain no blood in his stool no change in bowel habits no early satiety no dysphagia odynophagia or weight loss.  He feels good.  He has had a kidney transplant.  He does have atrial fib and pulmonary hypertension.    Abdominal pain - no  Reflux - no  Dysphagia - no   Bowel habits - normal  GI bleeding - none  NSAID usage - none, on blood thinner for atrial fib    Interval HPI 07/10/2024:  The patient's last visit with me was on Visit date not found.      ROS:  Constitutional: No fevers, chills, No weight loss  ENT:  No heartburn no dysphagia no odynophagia no hoarseness  CV: No chest pain, no palpitation, history of atrial fib  Pulm: No cough, No shortness of breath, no wheezing  Ophtho: No vision changes  GI:  see HPI  Derm: No rash, no itching  Heme: No lymphadenopathy, No easy bruising  MSK:  Chronic arthritis  : No dysuria, No hematuria, renal transplant  Endo: No hot or cold intolerance  Neuro: No syncope, No seizure, no strokes  Psych: No uncontrolled anxiety, No uncontrolled depression    Medical History:  has a past medical history of Anticoagulant long-term use, Anxiety (07/29/2014), Arthritis, atrial fibrillation, Bilateral diabetic retinopathy (2017), CKD (chronic kidney disease) stage 2, GFR 60-89 ml/min (12/28/2016), CKD (chronic kidney disease) stage 4, GFR 15-29 ml/min (07/29/2014), Colon polyps (2014), Depression - situational (07/29/2014), Diabetes mellitus, Diabetes type 2 since 2000 (07/29/2014), Diabetic neuropathy (07/29/2014), Encounter for blood transfusion, History of cardioversion (10/03/2019), History of hepatitis C, s/p successful Rx w/ SVR24 - 9/2017 (07/29/2014), Hyperlipidemia (07/29/2014), Hypertension, Neuropathy, Organ transplant candidate (07/29/2014), Pancreatitis, S/P cadaveric kidney transplant 11/5/2016. ESRD secondary to HTN/DMII (11/05/2016), Stage 3a chronic kidney disease (12/28/2016), and Type 2 diabetes mellitus with stage 3a chronic kidney disease, with long-term current use of insulin (07/29/2014).    Surgical History:  has a past surgical history that includes Appendectomy; Kidney transplant; Treatment of cardiac arrhythmia (N/A, 8/26/2019); Transesophageal echocardiography (N/A, 8/26/2019); Colonoscopy (N/A, 12/21/2020); Cataract extraction w/  intraocular lens implant (Right, 3/13/2024); Cataract extraction w/  intraocular lens implant (Left, 4/10/2024); Ablation of arrhythmogenic focus for atrial fibrillation (N/A, 6/11/2024); Cardioversion (6/11/2024); ablation, atrial flutter, typical (6/11/2024); Transesophageal echocardiography (N/A, 6/11/2024); and Bone marrow biopsy (Left, 6/26/2024).    Family History: family history includes Cancer in his brother; Diabetes in his  mother; Heart disease in his father and mother; Hypertension in his brother and mother..     Social History:  reports that he quit smoking about 7 years ago. His smoking use included cigarettes. He started smoking about 39 years ago. He has a 16 pack-year smoking history. He has never used smokeless tobacco. He reports current alcohol use. He reports that he does not use drugs.    Review of patient's allergies indicates:   Allergen Reactions    Nifedipine Other (See Comments)     Gingival hyperplasia       Medication List with Changes/Refills   Current Medications    APIXABAN (ELIQUIS) 5 MG TAB    Take 1 tablet (5 mg total) by mouth 2 (two) times daily.    ASPIRIN 81 MG CHEW    Take 81 mg by mouth once daily.    ATORVASTATIN (LIPITOR) 40 MG TABLET    Take 1 tablet (40 mg total) by mouth once daily.    BLOOD SUGAR DIAGNOSTIC STRP    Use to check blood glucose 1 times daily, to use with insurance preferred meter    BLOOD-GLUCOSE METER MISC    Use to check blood glucose 1 times daily, to use with insurance preferred meter    BLOOD-GLUCOSE SENSOR SHIKHA    Gin 3, use as directed, change every 14 days , e 11.65    CARVEDILOL (COREG) 25 MG TABLET    Take 1 tablet (25 mg total) by mouth 2 (two) times daily.    DRONEDARONE (MULTAQ) 400 MG TAB    Take 1 tablet (400 mg total) by mouth 2 (two) times daily with meals.    EMPAGLIFLOZIN (JARDIANCE) 10 MG TABLET    Take 1 tablet (10 mg total) by mouth once daily.    FUROSEMIDE (LASIX) 40 MG TABLET    Take 1 tablet (40 mg total) by mouth once daily.    GABAPENTIN (NEURONTIN) 300 MG CAPSULE    Take 1 capsule by mouth in the morning, 1 capsule midday, and 3 capsules at night.    HYDRALAZINE (APRESOLINE) 25 MG TABLET    Take 1 tablet (25 mg total) by mouth every 12 (twelve) hours.    INSULIN ASPART U-100 (NOVOLOG) 100 UNIT/ML (3 ML) INPN PEN    Inject 16 units w/ breakfast, 10 units at lunch and dinner scale 180-230+2, 231-280+4, 281-330+6, 331-380+8, >380+10.  *Max daily 66 units.*  "   INSULIN GLARGINE U-100, LANTUS, (LANTUS SOLOSTAR U-100 INSULIN) 100 UNIT/ML (3 ML) INPN PEN    Inject 20 Units into the skin every morning.    LANCETS 30 GAUGE MISC    Use to check blood glucose 1 times daily, to use with insurance preferred meter    MAGNESIUM OXIDE (MAG-OX) 400 MG (241.3 MG MAGNESIUM) TABLET    Take 1 tablet (400 mg total) by mouth 2 (two) times daily.    MECLIZINE (ANTIVERT) 25 MG TABLET    Take 1 tablet (25 mg total) by mouth 3 (three) times daily as needed for Dizziness.    MYCOPHENOLATE (CELLCEPT) 250 MG CAP    Take 2 capsules (500 mg total) by mouth 2 (two) times daily.    PANTOPRAZOLE (PROTONIX) 40 MG TABLET    Take 1 tablet (40 mg total) by mouth once daily.    PEN NEEDLE, DIABETIC (BD ULTRA-FINE LO PEN NEEDLE) 32 GAUGE X 5/32" NDLE    USE TO ADMINISTER INSULIN 4 TIMES DAILY.    PEN NEEDLE, DIABETIC (BD ULTRA-FINE LO PEN NEEDLE) 32 GAUGE X 5/32" NDLE    use four times a day    PREDNISONE (DELTASONE) 5 MG TABLET    Take 1 tablet (5 mg total) by mouth once daily.    TACROLIMUS (PROGRAF) 0.5 MG CAP    Take 1 capsule (0.5 mg total) by mouth every OTHER day.    VALSARTAN (DIOVAN) 320 MG TABLET    Take 1 tablet (320 mg total) by mouth once daily.         Objective Findings:    Vital Signs:  BP (!) 162/77   Pulse (!) 49   Ht 6' 1" (1.854 m)   Wt 86.2 kg (190 lb 0.6 oz)   BMI 25.07 kg/m²   Body mass index is 25.07 kg/m².    Physical Exam:  General Appearance: Well appearing in no acute distress  Eyes:    No scleral icterus  Skin: No petechiae or rash on exposed skin areas  Neurologic:  Alert and oriented x4  Psychiatric:  Normal speech mentation and affect    Labs:  Lab Results   Component Value Date    WBC 6.35 06/16/2024    HGB 7.9 (L) 06/16/2024    HCT 29.4 (L) 06/16/2024     (L) 06/16/2024    CHOL 112 (L) 07/01/2024    TRIG 187 (H) 07/01/2024    HDL 26 (L) 07/01/2024    ALT <5 (L) 06/16/2024    AST 14 06/16/2024     (L) 07/01/2024    K 5.4 (H) 07/01/2024     " 07/01/2024    CREATININE 2.1 (H) 07/01/2024    BUN 34 (H) 07/01/2024    CO2 23 07/01/2024    TSH 0.924 04/01/2024    PSA 2.4 04/01/2024    INR 1.2 06/04/2024    HGBA1C 6.8 (H) 05/02/2024         Medical Decision Making:  Total time with patient taking history discuss an H pylori has been 30 minutes  With 50% of the time face-to-face in room with patient today  Stool for H pylori antigen reviewed  Symptoms reviewed  H pylori talk given up-to-date information on H pylori  Stool antigen for H pylori test handout given  GI MA team - please order stool for H. Pylori Antigen in 4 weeks.    Please tell patient to check for H. Pylori eradiation we would like to check stool sample in 4 weeks which tells us if H. Pylori has been successfully eradicated with the prior treatment medications.      H. Pylori antigen test which tells us if H. Pylori has been successfully eradicated with the prior treatment medications, and please tell patient that Stool H. Pylori antigen needs to be done off the following medications for the following amount of time:    1. Off all Antibiotics for 4 (Four) weeks before stool collection.      2. Off all Proton Pump Inhibitors medications for 2 (Two) weeks before collecting stool for H. Pylori Antigen:    Nexium (esomeprazole)  Prilosec (omeprazole)   Protonix (pantoprazole)  Prevacid (lansoprazole)  Aciphex (rabeprazole)  Dexilant (dexlansoprazole)    Zegerid     3. Off all H2 blockers medications for 2 (Two) weeks before collecting stool for H. Pylori Antigen:    Zantac (ranitidine)  Tagamet (cimetadine)  Axid (nizatidine)   Pepcid (famotidine)    4. Off Pepto-Bismol for 4 (four) weeks prior to collecting stool for H. Pylori Antigen.    Assessment:  1. H. pylori infection         Recommendations:  1. Patient has completed his H pylori treatment he has not on any PPIs he is taking an H2 blocker he has not been on antibiotics for over 4 weeks it has been over 6 week since she completed his H pylori  therapy he has not having any GI symptoms will recommend a stool for H pylori antigen off all antibiotics and off Pepto-Bismol for 4 weeks and off all H2 blockers PPIs for at least 2 weeks he will be able turn in a stool for H pylori antigen in 2 weeks once he gets off his H2 blocker today.  2. Return GI clinic 8 weeks for follow-up okay for telemedicine video visit    Follow up in about 8 weeks (around 9/4/2024).      Order summary:  Orders Placed This Encounter    H. pylori antigen, stool         Thank you so much for allowing me to participate in the care of Ravi Obrien MD    DISCLAIMER: This note was prepared with [employer]. voice recognition transcription software. Garbled syntax, mangled or inadvertent pronouns, and other bizarre constructions may be attributed to that software system. While efforts were made to correct any mistakes made by this voice recognition program, some errors and/or omissions may remain in the note that were missed when the note was originally created.

## 2024-07-10 NOTE — PROGRESS NOTES
69 y/o male starting on warfarin due to stating he can no longer afford Eliquis    PMH includes:  Chronic diastolic heart failue  Persistent atrial fibrillation s/p CTI/PVI/PWI 6/11/2024   Hypertension  Mixed hyperlipidemia  PAD (peripheral artery disease)  S/P cadaveric kidney transplant 11/5/2016. ESRD secondary to HTN/DMII   Type 2 diabetes mellitus with stage 3a chronic kidney disease, with long-term current use of insulin   Claudication of both lower extremities    Patient has been prescribed Warfarin 5 mg daily.

## 2024-07-10 NOTE — PATIENT INSTRUCTIONS
GI MA team - please order stool for H. Pylori Antigen in 4 weeks.    Please tell patient to check for H. Pylori eradiation we would like to check stool sample in 4 weeks which tells us if H. Pylori has been successfully eradicated with the prior treatment medications.      H. Pylori antigen test which tells us if H. Pylori has been successfully eradicated with the prior treatment medications, and please tell patient that Stool H. Pylori antigen needs to be done off the following medications for the following amount of time:    1. Off all Antibiotics for 4 (Four) weeks before stool collection.      2. Off all Proton Pump Inhibitors medications for 2 (Two) weeks before collecting stool for H. Pylori Antigen:    Nexium (esomeprazole)  Prilosec (omeprazole)   Protonix (pantoprazole)  Prevacid (lansoprazole)  Aciphex (rabeprazole)  Dexilant (dexlansoprazole)    Zegerid     3. Off all H2 blockers medications for 2 (Two) weeks before collecting stool for H. Pylori Antigen:    Zantac (ranitidine)  Tagamet (cimetadine)  Axid (nizatidine)   Pepcid (famotidine)    4. Off Pepto-Bismol for 4 (four) weeks prior to collecting stool for H. Pylori Antigen.

## 2024-07-10 NOTE — Clinical Note
Please schedule patient for stool for H pylori antigen in 4 weeks  Return GI clinic 3 months for follow-up okay for telemedicine video visit

## 2024-07-10 NOTE — PROGRESS NOTES
Please schedule patient for stool for H pylori antigen in 2 weeks     Return GI clinic 3 months for follow-up okay for telemedicine video visit.   Melanie please contact the patient to arrange appt.   Thanks

## 2024-07-11 ENCOUNTER — LAB VISIT (OUTPATIENT)
Dept: LAB | Facility: HOSPITAL | Age: 69
End: 2024-07-11
Payer: MEDICARE

## 2024-07-11 ENCOUNTER — OFFICE VISIT (OUTPATIENT)
Dept: NEPHROLOGY | Facility: CLINIC | Age: 69
End: 2024-07-11
Payer: MEDICARE

## 2024-07-11 VITALS
OXYGEN SATURATION: 96 % | DIASTOLIC BLOOD PRESSURE: 71 MMHG | HEART RATE: 54 BPM | SYSTOLIC BLOOD PRESSURE: 142 MMHG | BODY MASS INDEX: 25.01 KG/M2 | WEIGHT: 189.63 LBS

## 2024-07-11 DIAGNOSIS — Z79.60 LONG-TERM USE OF IMMUNOSUPPRESSANT MEDICATION: ICD-10-CM

## 2024-07-11 DIAGNOSIS — N18.32 CKD STAGE 3B, GFR 30-44 ML/MIN: Primary | ICD-10-CM

## 2024-07-11 DIAGNOSIS — E11.3393 TYPE 2 DIABETES MELLITUS WITH BOTH EYES AFFECTED BY MODERATE NONPROLIFERATIVE RETINOPATHY WITHOUT MACULAR EDEMA, WITHOUT LONG-TERM CURRENT USE OF INSULIN: ICD-10-CM

## 2024-07-11 DIAGNOSIS — N18.32 CKD STAGE 3B, GFR 30-44 ML/MIN: ICD-10-CM

## 2024-07-11 DIAGNOSIS — E87.5 HYPERKALEMIA: ICD-10-CM

## 2024-07-11 DIAGNOSIS — Z94.0 S/P KIDNEY TRANSPLANT: ICD-10-CM

## 2024-07-11 DIAGNOSIS — R80.9 PROTEINURIA, UNSPECIFIED TYPE: ICD-10-CM

## 2024-07-11 DIAGNOSIS — I48.19 PERSISTENT ATRIAL FIBRILLATION: ICD-10-CM

## 2024-07-11 DIAGNOSIS — I10 HYPERTENSION, UNSPECIFIED TYPE: ICD-10-CM

## 2024-07-11 DIAGNOSIS — D63.8 ANEMIA OF CHRONIC DISEASE: ICD-10-CM

## 2024-07-11 LAB
25(OH)D3+25(OH)D2 SERPL-MCNC: 17 NG/ML (ref 30–96)
ALBUMIN SERPL BCP-MCNC: 3.6 G/DL (ref 3.5–5.2)
ANION GAP SERPL CALC-SCNC: 13 MMOL/L (ref 8–16)
BASOPHILS # BLD AUTO: 0.03 K/UL (ref 0–0.2)
BASOPHILS NFR BLD: 0.5 % (ref 0–1.9)
BUN SERPL-MCNC: 35 MG/DL (ref 8–23)
CALCIUM SERPL-MCNC: 9.5 MG/DL (ref 8.7–10.5)
CHLORIDE SERPL-SCNC: 100 MMOL/L (ref 95–110)
CO2 SERPL-SCNC: 22 MMOL/L (ref 23–29)
CREAT SERPL-MCNC: 2.2 MG/DL (ref 0.5–1.4)
DIFFERENTIAL METHOD BLD: ABNORMAL
EOSINOPHIL # BLD AUTO: 0 K/UL (ref 0–0.5)
EOSINOPHIL NFR BLD: 0.4 % (ref 0–8)
ERYTHROCYTE [DISTWIDTH] IN BLOOD BY AUTOMATED COUNT: 14.9 % (ref 11.5–14.5)
EST. GFR  (NO RACE VARIABLE): 32.2 ML/MIN/1.73 M^2
ESTIMATED AVG GLUCOSE: 146 MG/DL (ref 68–131)
GLUCOSE SERPL-MCNC: 109 MG/DL (ref 70–110)
HBA1C MFR BLD: 6.7 % (ref 4–5.6)
HBV CORE AB SERPL QL IA: NORMAL
HBV CORE IGM SERPL QL IA: NORMAL
HBV SURFACE AB SER-ACNC: 12.22 MIU/ML
HBV SURFACE AB SER-ACNC: REACTIVE M[IU]/ML
HBV SURFACE AG SERPL QL IA: NORMAL
HCT VFR BLD AUTO: 37.2 % (ref 40–54)
HCV AB SERPL QL IA: REACTIVE
HGB BLD-MCNC: 10.1 G/DL (ref 14–18)
HIV 1+2 AB+HIV1 P24 AG SERPL QL IA: NORMAL
IMM GRANULOCYTES # BLD AUTO: 0.08 K/UL (ref 0–0.04)
IMM GRANULOCYTES NFR BLD AUTO: 1.4 % (ref 0–0.5)
LYMPHOCYTES # BLD AUTO: 0.5 K/UL (ref 1–4.8)
LYMPHOCYTES NFR BLD: 8.6 % (ref 18–48)
MCH RBC QN AUTO: 23.3 PG (ref 27–31)
MCHC RBC AUTO-ENTMCNC: 27.2 G/DL (ref 32–36)
MCV RBC AUTO: 86 FL (ref 82–98)
MONOCYTES # BLD AUTO: 1.1 K/UL (ref 0.3–1)
MONOCYTES NFR BLD: 18.9 % (ref 4–15)
NEUTROPHILS # BLD AUTO: 3.9 K/UL (ref 1.8–7.7)
NEUTROPHILS NFR BLD: 70.2 % (ref 38–73)
NRBC BLD-RTO: 0 /100 WBC
PHOSPHATE SERPL-MCNC: 4.3 MG/DL (ref 2.7–4.5)
PLATELET # BLD AUTO: 135 K/UL (ref 150–450)
PMV BLD AUTO: ABNORMAL FL (ref 9.2–12.9)
POTASSIUM SERPL-SCNC: 3.6 MMOL/L (ref 3.5–5.1)
PTH-INTACT SERPL-MCNC: 163.2 PG/ML (ref 9–77)
RBC # BLD AUTO: 4.33 M/UL (ref 4.6–6.2)
SODIUM SERPL-SCNC: 135 MMOL/L (ref 136–145)
WBC # BLD AUTO: 5.57 K/UL (ref 3.9–12.7)

## 2024-07-11 PROCEDURE — 86832 HLA CLASS I HIGH DEFIN QUAL: CPT | Mod: HCNC | Performed by: STUDENT IN AN ORGANIZED HEALTH CARE EDUCATION/TRAINING PROGRAM

## 2024-07-11 PROCEDURE — 1126F AMNT PAIN NOTED NONE PRSNT: CPT | Mod: HCNC,CPTII,GC,S$GLB | Performed by: STUDENT IN AN ORGANIZED HEALTH CARE EDUCATION/TRAINING PROGRAM

## 2024-07-11 PROCEDURE — 80069 RENAL FUNCTION PANEL: CPT | Mod: HCNC | Performed by: STUDENT IN AN ORGANIZED HEALTH CARE EDUCATION/TRAINING PROGRAM

## 2024-07-11 PROCEDURE — 99215 OFFICE O/P EST HI 40 MIN: CPT | Mod: HCNC,GC,S$GLB, | Performed by: STUDENT IN AN ORGANIZED HEALTH CARE EDUCATION/TRAINING PROGRAM

## 2024-07-11 PROCEDURE — 99999 PR PBB SHADOW E&M-EST. PATIENT-LVL V: CPT | Mod: PBBFAC,HCNC,GC, | Performed by: STUDENT IN AN ORGANIZED HEALTH CARE EDUCATION/TRAINING PROGRAM

## 2024-07-11 PROCEDURE — 83036 HEMOGLOBIN GLYCOSYLATED A1C: CPT | Mod: HCNC | Performed by: STUDENT IN AN ORGANIZED HEALTH CARE EDUCATION/TRAINING PROGRAM

## 2024-07-11 PROCEDURE — 4010F ACE/ARB THERAPY RXD/TAKEN: CPT | Mod: HCNC,CPTII,GC,S$GLB | Performed by: STUDENT IN AN ORGANIZED HEALTH CARE EDUCATION/TRAINING PROGRAM

## 2024-07-11 PROCEDURE — 3077F SYST BP >= 140 MM HG: CPT | Mod: HCNC,CPTII,GC,S$GLB | Performed by: STUDENT IN AN ORGANIZED HEALTH CARE EDUCATION/TRAINING PROGRAM

## 2024-07-11 PROCEDURE — 86705 HEP B CORE ANTIBODY IGM: CPT | Mod: HCNC | Performed by: STUDENT IN AN ORGANIZED HEALTH CARE EDUCATION/TRAINING PROGRAM

## 2024-07-11 PROCEDURE — 3288F FALL RISK ASSESSMENT DOCD: CPT | Mod: HCNC,CPTII,GC,S$GLB | Performed by: STUDENT IN AN ORGANIZED HEALTH CARE EDUCATION/TRAINING PROGRAM

## 2024-07-11 PROCEDURE — 83970 ASSAY OF PARATHORMONE: CPT | Mod: HCNC | Performed by: STUDENT IN AN ORGANIZED HEALTH CARE EDUCATION/TRAINING PROGRAM

## 2024-07-11 PROCEDURE — 85025 COMPLETE CBC W/AUTO DIFF WBC: CPT | Mod: HCNC | Performed by: STUDENT IN AN ORGANIZED HEALTH CARE EDUCATION/TRAINING PROGRAM

## 2024-07-11 PROCEDURE — 86706 HEP B SURFACE ANTIBODY: CPT | Mod: 91,HCNC | Performed by: STUDENT IN AN ORGANIZED HEALTH CARE EDUCATION/TRAINING PROGRAM

## 2024-07-11 PROCEDURE — 86833 HLA CLASS II HIGH DEFIN QUAL: CPT | Mod: HCNC | Performed by: STUDENT IN AN ORGANIZED HEALTH CARE EDUCATION/TRAINING PROGRAM

## 2024-07-11 PROCEDURE — 3044F HG A1C LEVEL LT 7.0%: CPT | Mod: HCNC,CPTII,GC,S$GLB | Performed by: STUDENT IN AN ORGANIZED HEALTH CARE EDUCATION/TRAINING PROGRAM

## 2024-07-11 PROCEDURE — 87340 HEPATITIS B SURFACE AG IA: CPT | Mod: HCNC | Performed by: STUDENT IN AN ORGANIZED HEALTH CARE EDUCATION/TRAINING PROGRAM

## 2024-07-11 PROCEDURE — 1159F MED LIST DOCD IN RCRD: CPT | Mod: HCNC,CPTII,GC,S$GLB | Performed by: STUDENT IN AN ORGANIZED HEALTH CARE EDUCATION/TRAINING PROGRAM

## 2024-07-11 PROCEDURE — 86704 HEP B CORE ANTIBODY TOTAL: CPT | Mod: HCNC | Performed by: STUDENT IN AN ORGANIZED HEALTH CARE EDUCATION/TRAINING PROGRAM

## 2024-07-11 PROCEDURE — 3078F DIAST BP <80 MM HG: CPT | Mod: HCNC,CPTII,GC,S$GLB | Performed by: STUDENT IN AN ORGANIZED HEALTH CARE EDUCATION/TRAINING PROGRAM

## 2024-07-11 PROCEDURE — 3008F BODY MASS INDEX DOCD: CPT | Mod: HCNC,CPTII,GC,S$GLB | Performed by: STUDENT IN AN ORGANIZED HEALTH CARE EDUCATION/TRAINING PROGRAM

## 2024-07-11 PROCEDURE — 86803 HEPATITIS C AB TEST: CPT | Mod: HCNC | Performed by: STUDENT IN AN ORGANIZED HEALTH CARE EDUCATION/TRAINING PROGRAM

## 2024-07-11 PROCEDURE — 86977 RBC SERUM PRETX INCUBJ/INHIB: CPT | Mod: 59,HCNC | Performed by: STUDENT IN AN ORGANIZED HEALTH CARE EDUCATION/TRAINING PROGRAM

## 2024-07-11 PROCEDURE — 82306 VITAMIN D 25 HYDROXY: CPT | Mod: HCNC | Performed by: STUDENT IN AN ORGANIZED HEALTH CARE EDUCATION/TRAINING PROGRAM

## 2024-07-11 PROCEDURE — 87389 HIV-1 AG W/HIV-1&-2 AB AG IA: CPT | Mod: HCNC | Performed by: STUDENT IN AN ORGANIZED HEALTH CARE EDUCATION/TRAINING PROGRAM

## 2024-07-11 PROCEDURE — 3066F NEPHROPATHY DOC TX: CPT | Mod: HCNC,CPTII,GC,S$GLB | Performed by: STUDENT IN AN ORGANIZED HEALTH CARE EDUCATION/TRAINING PROGRAM

## 2024-07-11 PROCEDURE — 1101F PT FALLS ASSESS-DOCD LE1/YR: CPT | Mod: HCNC,CPTII,GC,S$GLB | Performed by: STUDENT IN AN ORGANIZED HEALTH CARE EDUCATION/TRAINING PROGRAM

## 2024-07-11 PROCEDURE — 36415 COLL VENOUS BLD VENIPUNCTURE: CPT | Mod: HCNC | Performed by: STUDENT IN AN ORGANIZED HEALTH CARE EDUCATION/TRAINING PROGRAM

## 2024-07-11 NOTE — PROGRESS NOTES
Subjective     Chief Complaint: CKD 3    History of Present Illness:  Mr. Ravi Zamorano is a 68 y.o. male with known ESRD 2/2 DM & HTN and who was previously on HD and then obtained a DBD kidney transplant (), post-kidney transplant CKD 3b, Hepatitis C (treated ), afib s/p cardioversion (), chronic diastolic heart failure, diabetic neuropathy & retinopathy, BPH, erectile dysfunction, and anxiety/depression.             Kidney transplant ()  Donor Type:   - Brain Death  Donor CMV Status:  Positive  Donor HBcAB:  Negative  Donor HCV Status:  Positive  Transplant nephrologist: Dr. Sosa   Transplant medications: Tacrolimus 0.5mg twice daily, Prednisone 5mg daily, and Mycophenolate 250mg 4 caps twice daily   Last Tacrolimus level was 4.5 on 2022 at 0800         Office Visit 24-- Patient last saw me in Dec 2023, at that time he was advised to follow up in three months however did not make an appointment. At that time, his creatinine was at his baseline of 1.7. Since that visit, he has had two hospitalizations, the first one was in May for symptomatic anemia, and the second one was in  for edema and volume overload. On  he had an acute rise in his serum creatinine to 2.7, was given IV diuresis with improvement in his creatinine to 2.1. He reports compliance with his immunosuppressive medications of  mg BID< prednisone 0.5 mg daily, and Tacrolimus 0.5 mg every other day (recently decreased from daily dosing for high troughs). He reports foam in his urine, but denies any pain, hematuria, fevers, urinary retention, or ongoing edema. His potassium is elevated on most recent check at 5.4, he was started on aldactone at hospital discharge but this was discontinued recently this week.      Review of Systems   Constitutional:  Negative for chills, diaphoresis, fever and weight loss.   Respiratory:  Negative for cough, shortness of breath and wheezing.     Cardiovascular:  Positive for claudication. Negative for chest pain, palpitations, orthopnea, leg swelling and PND.   Gastrointestinal:  Negative for abdominal pain, constipation, diarrhea, heartburn, nausea and vomiting.   Genitourinary:  Negative for dysuria and urgency.        Foamy urine   Musculoskeletal:  Negative for back pain, falls and neck pain.   Skin:  Negative for itching and rash.   Neurological:  Negative for dizziness, tremors, focal weakness, loss of consciousness, weakness and headaches.   Endo/Heme/Allergies:  Negative for polydipsia.       PAST HISTORY:     Past Medical History:   Diagnosis Date    Anticoagulant long-term use     Anxiety 07/29/2014    Arthritis     atrial fibrillation     Bilateral diabetic retinopathy 2017    CKD (chronic kidney disease) stage 2, GFR 60-89 ml/min 12/28/2016    CKD (chronic kidney disease) stage 4, GFR 15-29 ml/min 07/29/2014    Colon polyps 2014    Depression - situational 07/29/2014    Diabetes mellitus     Diabetes type 2 since 2000 07/29/2014    Diabetic neuropathy 07/29/2014    Encounter for blood transfusion     History of cardioversion 10/03/2019    History of hepatitis C, s/p successful Rx w/ SVR24 - 9/2017 07/29/2014    Genotype 1a, treatment naive 10/2014 liver biopsy - grade 1 / stage 1 Completed Harvoni w/ SVR    Hyperlipidemia 07/29/2014    Hypertension     Neuropathy     Organ transplant candidate 07/29/2014    Pancreatitis     S/P cadaveric kidney transplant 11/5/2016. ESRD secondary to HTN/DMII 11/05/2016    Stage 3a chronic kidney disease 12/28/2016    Type 2 diabetes mellitus with stage 3a chronic kidney disease, with long-term current use of insulin 07/29/2014       Past Surgical History:   Procedure Laterality Date    ABLATION OF ARRHYTHMOGENIC FOCUS FOR ATRIAL FIBRILLATION N/A 6/11/2024    Procedure: Ablation atrial fibrillation;  Surgeon: Bronson Bowden MD;  Location: [facility] EP LAB;  Service: Cardiology;  Laterality: N/A;  AF, FELICIANO (cx if  SR), PVI, RFA, Carto, Gen, GP, 3 Prep    ABLATION, ATRIAL FLUTTER, TYPICAL  6/11/2024    Procedure: Ablation, Atrial Flutter, Typical;  Surgeon: Bronson Bowden MD;  Location: [facility] EP LAB;  Service: Cardiology;;    APPENDECTOMY      BONE MARROW BIOPSY Left 6/26/2024    Procedure: Biopsy-bone marrow;  Surgeon: Bridger Zapata MD;  Location: [facility] ENDO (4TH FLR);  Service: Oncology;  Laterality: Left;  6/24-pt knows to hold aspirin and eliquis as instructed by hem/onc, precall complete-Kpvt    CARDIOVERSION  6/11/2024    Procedure: Cardioversion;  Surgeon: Bronson Bowden MD;  Location: [facility] EP LAB;  Service: Cardiology;;    CATARACT EXTRACTION W/  INTRAOCULAR LENS IMPLANT Right 3/13/2024    Procedure: EXTRACTION, CATARACT, WITH IOL INSERTION;  Surgeon: Jennifer Palacio MD;  Location: [facility] OR;  Service: Ophthalmology;  Laterality: Right;    CATARACT EXTRACTION W/  INTRAOCULAR LENS IMPLANT Left 4/10/2024    Procedure: EXTRACTION, CATARACT, WITH IOL INSERTION;  Surgeon: Jennifer Palacio MD;  Location: [facility] OR;  Service: Ophthalmology;  Laterality: Left;    COLONOSCOPY N/A 12/21/2020    Procedure: COLONOSCOPY;  Surgeon: Tico Bell MD;  Location: [facility] ENDO (4TH FLR);  Service: Endoscopy;  Laterality: N/A;  ok to hold eliquis per Dr. Valadez, see telephone encounter 11/13/2020-MS  covid test 12/[address]    KIDNEY TRANSPLANT      TRANSESOPHAGEAL ECHOCARDIOGRAPHY N/A 8/26/2019    Procedure: ECHOCARDIOGRAM, TRANSESOPHAGEAL;  Surgeon: Dolores Diagnostic Provider;  Location: [facility] EP LAB;  Service: Cardiology;  Laterality: N/A;    TRANSESOPHAGEAL ECHOCARDIOGRAPHY N/A 6/11/2024    Procedure: ECHOCARDIOGRAM, TRANSESOPHAGEAL;  Surgeon: Grayson Lin III, MD;  Location: [facility] EP LAB;  Service: Cardiology;  Laterality: N/A;    TREATMENT OF CARDIAC ARRHYTHMIA N/A 8/26/2019    Procedure: CARDIOVERSION;  Surgeon: Bronson Bowden MD;  Location: [facility] EP LAB;  Service: Cardiology;  Laterality: N/A;  AF, FELICIANO, DCCV, MAC, GP,  3 PREP       Family History   Problem Relation Name Age of Onset    Diabetes Mother      Hypertension Mother      Heart disease Mother          CAD with PCI    Heart disease Father      Cancer Brother 2         one sister with breast cancer    Hypertension Brother 2         one sister with HTN and borderline DM    Stroke Neg Hx      Kidney disease Neg Hx      Colon cancer Neg Hx      Esophageal cancer Neg Hx         Social History     Socioeconomic History    Marital status:    Occupational History     Employer: [employer]   Tobacco Use    Smoking status: Former     Current packs/day: 0.00     Average packs/day: 0.5 packs/day for 32.0 years (16.0 ttl pk-yrs)     Types: Cigarettes     Start date: 1984     Quit date: 2016     Years since quittin.6    Smokeless tobacco: Never    Tobacco comments:     Pt reports that he quit in , but started up again in . pt reports he is currently working on quitting again   Substance and Sexual Activity    Alcohol use: Yes     Comment: Pt reports occasional beers on Sundays. Pt reports drinking daily prior to ESRD diagnosis.    Drug use: No    Sexual activity: Yes     Partners: Female   Social History Narrative     for 14 years     for 38 years    2 children ages 41, 42     Social Determinants of Health     Financial Resource Strain: Low Risk  (5/3/2024)    Overall Financial Resource Strain (CARDIA)     Difficulty of Paying Living Expenses: Not hard at all   Food Insecurity: No Food Insecurity (5/3/2024)    Hunger Vital Sign     Worried About Running Out of Food in the Last Year: Never true     Ran Out of Food in the Last Year: Never true   Transportation Needs: No Transportation Needs (5/3/2024)    PRAPARE - Transportation     Lack of Transportation (Medical): No     Lack of Transportation (Non-Medical): No   Physical Activity: Inactive (5/3/2024)    Exercise Vital Sign     Days of Exercise per Week: 0 days     Minutes of  Exercise per Session: 0 min   Stress: No Stress Concern Present (5/3/2024)    [employer] [place] [employer] - Occupational Stress Questionnaire     Feeling of Stress : Not at all   Housing Stability: Low Risk  (5/3/2024)    Housing Stability Vital Sign     Unable to Pay for Housing in the Last Year: No     Homeless in the Last Year: No       MEDICATIONS & ALLERGIES:     Current Outpatient Medications on File Prior to Visit   Medication Sig    aspirin 81 MG Chew Take 81 mg by mouth once daily.    atorvastatin (LIPITOR) 40 MG tablet Take 1 tablet (40 mg total) by mouth once daily.    blood sugar diagnostic Strp Use to check blood glucose 1 times daily, to use with insurance preferred meter    blood-glucose meter Misc Use to check blood glucose 1 times daily, to use with insurance preferred meter    blood-glucose sensor Kayla Gin 3, use as directed, change every 14 days , e 11.65    carvediloL (COREG) 25 MG tablet Take 1 tablet (25 mg total) by mouth 2 (two) times daily.    dronedarone (MULTAQ) 400 mg Tab Take 1 tablet (400 mg total) by mouth 2 (two) times daily with meals.    empagliflozin (JARDIANCE) 10 mg tablet Take 1 tablet (10 mg total) by mouth once daily.    furosemide (LASIX) 40 MG tablet Take 1 tablet (40 mg total) by mouth once daily.    gabapentin (NEURONTIN) 300 MG capsule Take 1 capsule by mouth in the morning, 1 capsule midday, and 3 capsules at night.    hydrALAZINE (APRESOLINE) 50 MG tablet Take 1 tablet (50 mg total) by mouth every 8 (eight) hours.    insulin aspart U-100 (NOVOLOG) 100 unit/mL (3 mL) InPn pen Inject 16 units w/ breakfast, 10 units at lunch and dinner scale 180-230+2, 231-280+4, 281-330+6, 331-380+8, >380+10.  *Max daily 66 units.* (Patient taking differently: Inject 15 Units into the skin 3 (three) times daily with meals. Inject 16 units w/ breakfast, 10 units at lunch and dinner scale 180-230+2, 231-280+4, 281-330+6, 331-380+8, >380+10.  *Max daily 66 units.*)    insulin  "glargine U-100, Lantus, (LANTUS SOLOSTAR U-100 INSULIN) 100 unit/mL (3 mL) InPn pen Inject 20 Units into the skin every morning.    lancets 30 gauge Misc Use to check blood glucose 1 times daily, to use with insurance preferred meter    magnesium oxide (MAG-OX) 400 mg (241.3 mg magnesium) tablet Take 1 tablet (400 mg total) by mouth 2 (two) times daily.    meclizine (ANTIVERT) 25 mg tablet Take 1 tablet (25 mg total) by mouth 3 (three) times daily as needed for Dizziness.    mycophenolate (CELLCEPT) 250 mg Cap Take 2 capsules (500 mg total) by mouth 2 (two) times daily.    pen needle, diabetic (BD ULTRA-FINE LO PEN NEEDLE) 32 gauge x 5/32" Ndle USE TO ADMINISTER INSULIN 4 TIMES DAILY.    pen needle, diabetic (BD ULTRA-FINE LO PEN NEEDLE) 32 gauge x 5/32" Ndle use four times a day    predniSONE (DELTASONE) 5 MG tablet Take 1 tablet (5 mg total) by mouth once daily.    tacrolimus (PROGRAF) 0.5 MG Cap Take 1 capsule (0.5 mg total) by mouth every OTHER day.    valsartan (DIOVAN) 320 MG tablet Take 1 tablet (320 mg total) by mouth once daily.    pantoprazole (PROTONIX) 40 MG tablet Take 1 tablet (40 mg total) by mouth once daily. (Patient not taking: Reported on 7/11/2024)    warfarin (COUMADIN) 5 MG tablet Take 1 tablet (5 mg total) by mouth Daily. (Patient not taking: Reported on 7/11/2024)    [DISCONTINUED] insulin lispro (HUMALOG KWIKPEN INSULIN) 100 unit/mL pen Inject 18 units w/ breakfast, 16 units w/ lunch and dinner plus scale 150-200 +2, 201-250 +4, 251-300 +6, 301-350 +8.     Current Facility-Administered Medications on File Prior to Visit   Medication    balanced salt irrigation intra-ocular solution 1 drop    phenylephrine HCL 10% ophthalmic solution 1 drop    proparacaine 0.5 % ophthalmic solution 1 drop    sodium chloride 0.9% flush 10 mL    TETRAcaine HCl (PF) 0.5 % Drop 1 drop       Review of patient's allergies indicates:   Allergen Reactions    Nifedipine Other (See Comments)     Gingival " hyperplasia       OBJECTIVE:     Vital Signs:  Vitals:    07/11/24 1420   BP: (!) 142/71   Pulse: (!) 54   SpO2: 96%   Weight: 86 kg (189 lb 9.5 oz)       Body mass index is 25.01 kg/m².     Physical Exam  Constitutional:       General: He is not in acute distress.     Appearance: Normal appearance. He is not ill-appearing or toxic-appearing.   HENT:      Head: Atraumatic.      Right Ear: External ear normal.      Left Ear: External ear normal.      Nose: Nose normal.      Mouth/Throat:      Mouth: Mucous membranes are moist.   Eyes:      Extraocular Movements: Extraocular movements intact.   Cardiovascular:      Rate and Rhythm: Normal rate and regular rhythm.      Pulses: Normal pulses.      Heart sounds: Normal heart sounds. No murmur heard.  Pulmonary:      Effort: Pulmonary effort is normal. No respiratory distress.      Breath sounds: Normal breath sounds. No wheezing or rales.   Chest:      Chest wall: No tenderness.   Abdominal:      General: Abdomen is flat.      Palpations: Abdomen is soft.   Musculoskeletal:         General: No swelling. Normal range of motion.      Cervical back: Normal range of motion.      Right lower leg: No edema.      Left lower leg: No edema.   Skin:     General: Skin is warm.      Capillary Refill: Capillary refill takes less than 2 seconds.      Coloration: Skin is not jaundiced.      Findings: No lesion or rash.   Neurological:      General: No focal deficit present.      Mental Status: He is alert and oriented to person, place, and time. Mental status is at baseline.      Cranial Nerves: No cranial nerve deficit.      Motor: No weakness.   Psychiatric:         Mood and Affect: Mood normal.         Behavior: Behavior normal.         Laboratory  No results found for this or any previous visit (from the past 24 hour(s)).    Diagnostic Results:      Health Maintenance Due   Topic Date Due    Foot Exam  12/02/2021    COVID-19 Vaccine (7 - 2023-24 season) 01/03/2024    Diabetes Urine  Screening  03/01/2024         ASSESSMENT & PLAN:   Mr. Ravi Zamorano is a 68 y.o. male post renal transplant in 2016 who presents to the office for follow up of CKD. He has had stable renal function over the past 18 months, with his creatinine at 1.7, however in June 2024 he had an acute rise in creatinine to 2.7 in the setting of volume overload. He reports worsening foam in his urine. He denies missing any immunosuppressive medications but did have an elevated trough and his tacrolimus was reduced to every other day dosing. A1C is elevated at 6.8 in May and he reports fluctuations in his blood pressures ranging from 110s systolic to 150s systolic.    At this time, I am concerned for potential rejection due to the decrease in renal function. I have ordered HIV, hepatitis labs, as well as DSA and allosures. Ultrasound of the transplanted kidney and the native kidneys (previous cyst) has been ordered. I have also reached out to Dr. Sosa to assist in arranging a biopsy of his transplanted kidney. I have asked him to limit his protein and animal protein intake, we will recheck renal function panel now, if potassium remains elevated then I will plan to start him on Lokelma or Valtassa (pending insurance preference). Start on MARCIAL for his anemia. I have advised the patient to increase his daily exercise as well as try to lose some weight. I have also advised that he adopt a low salt (less than 2g/day) as well as low protein diet.     I have advised that he check his blood pressures three tiems daily and to alert me if his blood pressure is elevated above 130/80 mmHg. I offered him digital blood pressure monitoring, which he is not interested in at this time and would prefer to use his own machine. Please bring in your home blood pressure machine during your next visit for comparison to our readings.     I have asked the patient to follow up with his endocrinologist for diabetes management.    CKD stage 3b, GFR 30-44  ml/min  -     US Transplant Kidney With Doppler; Future; Expected date: 07/11/2024  -     Vitamin D; Future; Expected date: 07/11/2024  -     PTH, intact; Future; Expected date: 07/11/2024  -     Renal Function Panel; Future; Expected date: 07/11/2024  -     CBC Auto Differential; Future; Expected date: 07/11/2024  -     Urinalysis; Future; Expected date: 07/11/2024  -     HIV 1/2 Ag/Ab (4th Gen); Future; Expected date: 07/11/2024  -     Hepatitis C Antibody; Future; Expected date: 07/11/2024  -     Hepatitis B Surface Antigen; Future; Expected date: 07/11/2024  -     HEPATITIS B CORE ANTIBODY, TOTAL; Future; Expected date: 07/11/2024  -     HEPATITIS B CORE ANTIBODY, IGM; Future; Expected date: 07/11/2024  -     Hepatitis B Surface Ab, Qualitative; Future; Expected date: 07/11/2024  -     HLA Donor Specific Antibodies; Future; Expected date: 07/11/2024  -     AlloSure Kidney; Future; Expected date: 07/11/2024    S/P cadaveric kidney transplant 11/5/2016. ESRD secondary to HTN/DMII  -     US Retroperitoneal Complete; Future; Expected date: 07/11/2024  -     HLA Donor Specific Antibodies; Future; Expected date: 07/11/2024  -     AlloSure Kidney; Future; Expected date: 07/11/2024    Type 2 diabetes mellitus with both eyes affected by moderate nonproliferative retinopathy without macular edema, without long-term current use of insulin  -     Hemoglobin A1c; Future; Expected date: 07/11/2024    Hyperkalemia    Anemia of chronic disease  -     epoetin tanya injection 4,000 Units  -     Prior authorization Order    Proteinuria, unspecified type  -     Protein / creatinine ratio, urine; Future; Expected date: 07/11/2024  -     Microalbumin/creatinine urine ratio; Future; Expected date: 07/11/2024    Hypertension, unspecified type    Persistent atrial fibrillation    Long-term use of immunosuppressant medication  -     Ambulatory referral/consult to Dermatology; Future; Expected date: 07/18/2024          RTC in 3  months    Discussed with Dr. Horne  - staff attestation to follow      Francesco Lopez MD  Nephrology PGY-4    [address], [place] [id number] [phone]

## 2024-07-12 NOTE — PROCEDURES
Nail debridement    Date/Time: 7/3/2024 2:00 PM    Performed by: Syl Dave DPM  Authorized by: Syl Dave DPM    Consent Done?:  Yes (Verbal)    Nail Care Type:  Debride(Left 1st Toe, Right 1st Toe and Right 2nd Toe)  Patient tolerance:  Patient tolerated the procedure well with no immediate complications  DM nail trimming    Date/Time: 7/3/2024 2:00 PM    Performed by: Syl Dave DPM  Authorized by: Syl Dave DPM    Consent Done?:  Yes (Verbal)    Nail Care Type:  Trim(Left 2nd Toe, Left 3rd Toe, Left 4th Toe, Left 5th Toe, Right 3rd Toe, Right 4th Toe and Right 5th Toe)  Patient tolerance:  Patient tolerated the procedure well with no immediate complications

## 2024-07-12 NOTE — PROGRESS NOTES
Spoke with pt. He has been unable to contact his physicians regarding starting warfarin or not. I provided him with phone numbers for Dr. Bowden and Dr Mack's clinics. Pt states he will try to reach them today. He says he has enough Eliquis to last until 7/17.

## 2024-07-12 NOTE — PROGRESS NOTES
I have reviewed the notes, assessments performed by the fellow, I concur with his documentation of Ravi Zamorano.  Date of Service: 7/11/2024    I have seen the patient and discussed with him updating the lifestyle and diet with the home monitoring of the BP.     Work up for possible causes of proteinuria is sent.     We communicated with the transplant team regarding ruling out rejection as a cause of his HTN, Hyperkalemia, anasarca and decreased renal function.     LUCIO DEL VALLE.Becka. MD. SUSANA. EZIO.  , [employer]  Nephrology Consultant. [facility].   [address] [place] [address]. [address]th [place] [address].    email: [email or web address]  Tel: Office: [phone]

## 2024-07-15 NOTE — PROGRESS NOTES
Pt confirmed that he has decided that he does not want to take the warfarin due to the side effects and strict diet. He states he has reached out to both his PCP and Dr Bowden, but has not heard back yet.

## 2024-07-16 ENCOUNTER — OFFICE VISIT (OUTPATIENT)
Dept: HEMATOLOGY/ONCOLOGY | Facility: CLINIC | Age: 69
End: 2024-07-16
Payer: MEDICARE

## 2024-07-16 ENCOUNTER — TELEPHONE (OUTPATIENT)
Dept: CARDIOLOGY | Facility: CLINIC | Age: 69
End: 2024-07-16
Payer: MEDICARE

## 2024-07-16 VITALS
HEIGHT: 73 IN | WEIGHT: 190.06 LBS | BODY MASS INDEX: 25.19 KG/M2 | HEART RATE: 59 BPM | TEMPERATURE: 98 F | OXYGEN SATURATION: 100 % | DIASTOLIC BLOOD PRESSURE: 81 MMHG | SYSTOLIC BLOOD PRESSURE: 189 MMHG

## 2024-07-16 DIAGNOSIS — D69.6 THROMBOCYTOPENIA, UNSPECIFIED: Primary | ICD-10-CM

## 2024-07-16 DIAGNOSIS — D61.818 PANCYTOPENIA: ICD-10-CM

## 2024-07-16 DIAGNOSIS — Z79.01 LONG TERM (CURRENT) USE OF ANTICOAGULANTS: Primary | ICD-10-CM

## 2024-07-16 DIAGNOSIS — D63.8 ANEMIA OF CHRONIC DISEASE: Primary | ICD-10-CM

## 2024-07-16 DIAGNOSIS — I48.19 PERSISTENT ATRIAL FIBRILLATION: ICD-10-CM

## 2024-07-16 LAB
CLASS I ANTIBODY COMMENTS - LUMINEX: NORMAL
CLASS II ANTIBODY COMMENTS - LUMINEX: NORMAL
DSA1 TESTING DATE: NORMAL
DSA12 TESTING DATE: NORMAL
DSA2 TESTING DATE: NORMAL
SERUM COLLECTION DT - LUMINEX CLASS I: NORMAL
SERUM COLLECTION DT - LUMINEX CLASS II: NORMAL

## 2024-07-16 PROCEDURE — 3008F BODY MASS INDEX DOCD: CPT | Mod: HCNC,CPTII,S$GLB, | Performed by: INTERNAL MEDICINE

## 2024-07-16 PROCEDURE — 3044F HG A1C LEVEL LT 7.0%: CPT | Mod: HCNC,CPTII,S$GLB, | Performed by: INTERNAL MEDICINE

## 2024-07-16 PROCEDURE — 4010F ACE/ARB THERAPY RXD/TAKEN: CPT | Mod: HCNC,CPTII,S$GLB, | Performed by: INTERNAL MEDICINE

## 2024-07-16 PROCEDURE — 1101F PT FALLS ASSESS-DOCD LE1/YR: CPT | Mod: HCNC,CPTII,S$GLB, | Performed by: INTERNAL MEDICINE

## 2024-07-16 PROCEDURE — 3288F FALL RISK ASSESSMENT DOCD: CPT | Mod: HCNC,CPTII,S$GLB, | Performed by: INTERNAL MEDICINE

## 2024-07-16 PROCEDURE — 3077F SYST BP >= 140 MM HG: CPT | Mod: HCNC,CPTII,S$GLB, | Performed by: INTERNAL MEDICINE

## 2024-07-16 PROCEDURE — 99212 OFFICE O/P EST SF 10 MIN: CPT | Mod: HCNC,S$GLB,, | Performed by: INTERNAL MEDICINE

## 2024-07-16 PROCEDURE — 1159F MED LIST DOCD IN RCRD: CPT | Mod: HCNC,CPTII,S$GLB, | Performed by: INTERNAL MEDICINE

## 2024-07-16 PROCEDURE — 3066F NEPHROPATHY DOC TX: CPT | Mod: HCNC,CPTII,S$GLB, | Performed by: INTERNAL MEDICINE

## 2024-07-16 PROCEDURE — 1126F AMNT PAIN NOTED NONE PRSNT: CPT | Mod: HCNC,CPTII,S$GLB, | Performed by: INTERNAL MEDICINE

## 2024-07-16 PROCEDURE — 99999 PR PBB SHADOW E&M-EST. PATIENT-LVL IV: CPT | Mod: PBBFAC,HCNC,, | Performed by: INTERNAL MEDICINE

## 2024-07-16 PROCEDURE — 3079F DIAST BP 80-89 MM HG: CPT | Mod: HCNC,CPTII,S$GLB, | Performed by: INTERNAL MEDICINE

## 2024-07-16 NOTE — TELEPHONE ENCOUNTER
Pt updated on Xarelto 15 ordered, teaching done. Also updated on coupon called to pharmacy and one free month available. Pt stated that he spoke w. Ms Givens today about assistance. Teaching done on the [employer] assistance program and info given for pt to enroll. Pt is supposed to call me back once enrolled so that we can send his prescription to the [facility] pharmacy.  Pt verbalized understanding. Coumadin clinic updated.

## 2024-07-16 NOTE — TELEPHONE ENCOUNTER
----- Message from Noemi Grimaldo RN sent at 7/16/2024  1:50 PM CDT -----  Regarding: RE: medication  Yes please go ahead and send it to the local pharmacy. I will give them a one month coupon. I just messaged Pharmacy Patient Assistance Department as he missed their phone call when you referred him to them.  Noemi  ----- Message -----  From: Nayely Vital PA-C  Sent: 7/16/2024   1:50 PM CDT  To: Noemi Grimaldo RN  Subject: RE: medication                                   Hi,     Yes, he can switch to Xarelto if more affordable. Should I go ahead and send in the Rx?  ----- Message -----  From: Noemi Grimaldo RN  Sent: 7/16/2024   1:43 PM CDT  To: Noemi Grimaldo RN; #  Subject: FW: medication                                   Pt does not want to switch to warfarin (did not start) due to frequent blood work and restrictions. He only has a few pills left of Eliquis. If meets the criteria for the med, he is interested in getting on Xarelto and their $85 Xarelto and Me program. He is also interested in applying for pt assistance and a referral was sent.     Please advise on switching to Xarelto and if does not qualify, switching back to Eliquis.  ----- Message -----  From: Noemi Grimaldo RN  Sent: 7/15/2024   9:16 AM CDT  To: Noemi Grimaldo RN  Subject: FW: medication                                     ----- Message -----  From: Laura Lazo  Sent: 7/12/2024  11:12 AM CDT  To: Amanuel Adler Staff  Subject: medication                                       Pt was prescribed   warfarin (COUMADIN) 5 MG tablet and the pharmacist was explaining the side effects and pt would rather try a different medication and can be reached at [phone].    Thank you

## 2024-07-16 NOTE — TELEPHONE ENCOUNTER
----- Message from Noemi Grimaldo, RN sent at 7/16/2024  3:50 PM CDT -----  Regarding: off warfarin  Pt decided that he did not want to be on warfarin after all. Med was discontinued.    Thanks for your help.      Noemi,  General Cardiology Triage RN

## 2024-07-16 NOTE — PROGRESS NOTES
Subjective     Patient ID: Ravi Zamorano is a 68 y.o. male.    Chief Complaint: No chief complaint on file.    HPI 68-year-old man who returns for F/U of chronic anemia with recent worsening.      He underwent bone marrow exam 6/26/24 with the following result:  BONE MARROW, RIGHT ILIAC CREST (ASPIRATE SMEAR, TOUCH IMPRINT, CLOT SECTION, AND CORE BIOPSY):   -- HYPERCELLULAR MARROW FOR AGE (60%) WITH TRILINEAGE HEMATOPOIESIS, ERYTHROID HYPERPLASIA AND MILD MEGAKARYOCYTIC HYPERPLASIA.   -- ADEQUATE STORAGE IRON     Cytogenetics were normal    Anemia History:  He had microcytosis without anemia in 2006 with a mild decrease in platelets of [id number].  White blood cell count was normal at that time.    In 2014 hemoglobin was 10 normal white blood cell count and platelet count.  In 2016 iron studies were unremarkable.  His hemoglobin ranged from 12-13 with platelet count in the [id number] range in 2016.  He had normal iron studies in January 2017.  In hemoglobin ranged from the 11-12 grams/deciliter range up until August 2022.  In November 2022 hemoglobin was 10 that declined to 9.1 in November 2023.    Iron studies in July 2022 showed iron saturation of 6% with an iron of 14 TIBC of 222 and ferritin of 150.    He presented to the emergency room on April 1, 2024 with a hemoglobin of 6.  He received 2 units of packed red blood cells.  Other studies included iron saturation of 56%, ferritin 464, B12 and folate were normal. Reticulocyte count was 0.6%, haptoglobin was normal. TSH normal.    On May 14, 2024 hemoglobin was 8.8 with an SUV of 90 white blood cell count 5350 and platelets 861964.  Metabolic profile showed a creatinine of 1.7.    Patient has multiple medical problems, most importantly he is status post renal transplant in 2016 and is on immunosuppression with tacrolimus and mycophenolate.    Other medical problems include hyperlipidemia hypertension diabetes type 2 and atrial fibrillation, CHF  .        Review of  Systems   Musculoskeletal:         Left leg pain over the last week          Objective     Physical Exam  Vitals reviewed.   Constitutional:       General: He is not in acute distress.     Appearance: Normal appearance.   Neurological:      Mental Status: He is alert and oriented to person, place, and time.   Psychiatric:         Mood and Affect: Mood normal.         Behavior: Behavior normal.         Thought Content: Thought content normal.         Judgment: Judgment normal.          Assessment and Plan     1. Thrombocytopenia, unspecified    2. Pancytopenia      I discussed the bone marrow results - no significant findings.  The labs are consistent with anemia of chronic disease  - related to his kidney disease and possibly some medication effect from his immunosuppressants.    He has intermittent mild thrombocytopenia which may be immune mediated, fortunately this has not been a persistent or progressive problem.    No further workup is indicated.     Route Chart for Scheduling    Med Onc Chart Routing      Follow up with physician No follow up needed.   Follow up with KYLE    Infusion scheduling note    Injection scheduling note    Labs None   Scheduling:  Preferred lab:  Lab interval:     Imaging None      Pharmacy appointment No pharmacy appointment needed      Other referrals no referral to [facility] needed -  no Massage appointment needed    No additional referrals needed           Therapy Plan Information  INF PENICILLIN G & THYMOGLOBULIN OUTPATIENT INFUSION  Flushes  sodium chloride 0.9% flush 10 mL  10 mL, Intravenous, PRN  heparin lock flush (porcine) injection 100 Units  100 Units, Intravenous, PRN    INF EPOETIN TANYA (RETACRIT) INITIAL DOSES  epoetin tanya-epbx injection 4,000 Units  4,000 Units, Subcutaneous, 1 time a week      No follow-ups on file.

## 2024-07-17 ENCOUNTER — TELEPHONE (OUTPATIENT)
Dept: CARDIOLOGY | Facility: CLINIC | Age: 69
End: 2024-07-17
Payer: MEDICARE

## 2024-07-17 ENCOUNTER — TELEPHONE (OUTPATIENT)
Dept: INTERNAL MEDICINE | Facility: CLINIC | Age: 69
End: 2024-07-17
Payer: MEDICARE

## 2024-07-17 DIAGNOSIS — R06.09 DOE (DYSPNEA ON EXERTION): ICD-10-CM

## 2024-07-17 DIAGNOSIS — I48.91 NEW ONSET A-FIB: ICD-10-CM

## 2024-07-17 NOTE — TELEPHONE ENCOUNTER
----- Message from Eleanor Prasad sent at 7/17/2024 12:03 PM CDT -----  Regarding: RE: assistance  I spoke with the patient on yesterday he has to spend 1750.00 on medications for the year to qualify for the program.  I was able to print his expense report and he is almost at 800.00 so I am waiting on the patient wife to get her expense report to see what she has spent.  If the patient is prescribed Xalreto the company has a mail order pharmacy that charges the patient 99.00 a month or 240.00 for a 90 day supply.    Eleanor  ----- Message -----  From: Noemi Grimaldo, RN  Sent: 7/16/2024   1:50 PM CDT  To: Noemi Grimaldo RN; Eleanor Prasad  Subject: assistance                                       Spoke w. pt today and updated him on having missed your call about assistance. He still needs to apply for assistance for Eliquis. He is about to be taken off warfarin and might get started on Xarelto until approved for Eliquis assistance if applies.    You might want to call him or provide me with the number he needs to reach you.    Noemi,  General Cardiology Triage RN

## 2024-07-17 NOTE — TELEPHONE ENCOUNTER
----- Message from Priya Calvin sent at 7/16/2024  2:01 PM CDT -----  Contact: Mobile  [phone]  Patient would like for you to transfer his Eliquis from [facility] and he would like for you to send a thirty day supply of the Eliquis to ...     [facility] [address] [place] [address]  Phone: [phone] Fax: [phone]

## 2024-07-18 ENCOUNTER — TELEPHONE (OUTPATIENT)
Dept: NEPHROLOGY | Facility: CLINIC | Age: 69
End: 2024-07-18
Payer: MEDICARE

## 2024-07-18 NOTE — TELEPHONE ENCOUNTER
Informed pt     ----- Message from Mima Galicia LPN sent at 7/16/2024  4:50 PM CDT -----  Francesco Lopez MD Joseph, Jennifer A., LPN  Caller: Unspecified (Today, 10:15 AM)  He should make an effort to limit animal products as much as he can. If he eats any meat then it should be eaten sparingly and in moderation.        Previous Messages       ----- Message -----  From: Mima Galicia LPN  Sent: 7/16/2024  10:38 AM CDT  To: Francesco Lopez MD    Pt would like to know can he eat meat like chicken, fish and shrimp or should he not eat any meat ?

## 2024-07-19 DIAGNOSIS — Z94.0 KIDNEY REPLACED BY TRANSPLANT: Primary | ICD-10-CM

## 2024-07-23 ENCOUNTER — OFFICE VISIT (OUTPATIENT)
Dept: INTERNAL MEDICINE | Facility: CLINIC | Age: 69
End: 2024-07-23
Payer: MEDICARE

## 2024-07-23 VITALS
WEIGHT: 191.81 LBS | HEIGHT: 73 IN | SYSTOLIC BLOOD PRESSURE: 150 MMHG | BODY MASS INDEX: 25.42 KG/M2 | OXYGEN SATURATION: 99 % | DIASTOLIC BLOOD PRESSURE: 64 MMHG | HEART RATE: 55 BPM

## 2024-07-23 DIAGNOSIS — E11.22 TYPE 2 DIABETES MELLITUS WITH STAGE 3A CHRONIC KIDNEY DISEASE, WITH LONG-TERM CURRENT USE OF INSULIN: Primary | ICD-10-CM

## 2024-07-23 DIAGNOSIS — N18.31 STAGE 3A CHRONIC KIDNEY DISEASE: ICD-10-CM

## 2024-07-23 DIAGNOSIS — I10 ESSENTIAL (PRIMARY) HYPERTENSION: ICD-10-CM

## 2024-07-23 DIAGNOSIS — E11.42 DIABETIC POLYNEUROPATHY ASSOCIATED WITH TYPE 2 DIABETES MELLITUS: ICD-10-CM

## 2024-07-23 DIAGNOSIS — N18.31 TYPE 2 DIABETES MELLITUS WITH STAGE 3A CHRONIC KIDNEY DISEASE, WITH LONG-TERM CURRENT USE OF INSULIN: Primary | ICD-10-CM

## 2024-07-23 DIAGNOSIS — Z94.0 STATUS POST DECEASED-DONOR KIDNEY TRANSPLANTATION: ICD-10-CM

## 2024-07-23 DIAGNOSIS — Z79.4 TYPE 2 DIABETES MELLITUS WITH STAGE 3A CHRONIC KIDNEY DISEASE, WITH LONG-TERM CURRENT USE OF INSULIN: Primary | ICD-10-CM

## 2024-07-23 DIAGNOSIS — I73.9 CLAUDICATION OF BOTH LOWER EXTREMITIES: ICD-10-CM

## 2024-07-23 DIAGNOSIS — D69.6 THROMBOCYTOPENIA, UNSPECIFIED: ICD-10-CM

## 2024-07-23 DIAGNOSIS — T45.1X5D ADVERSE EFFECT OF IMMUNOSUPPRESSIVE DRUG, SUBSEQUENT ENCOUNTER: ICD-10-CM

## 2024-07-23 DIAGNOSIS — I48.0 PAROXYSMAL A-FIB: ICD-10-CM

## 2024-07-23 DIAGNOSIS — I73.9 PAD (PERIPHERAL ARTERY DISEASE): ICD-10-CM

## 2024-07-23 DIAGNOSIS — Z79.01 ANTICOAGULANT LONG-TERM USE: ICD-10-CM

## 2024-07-23 DIAGNOSIS — T38.0X5D ADVERSE EFFECT OF GLUCOCORTICOID OR SYNTHETIC ANALOGUE, SUBSEQUENT ENCOUNTER: ICD-10-CM

## 2024-07-23 DIAGNOSIS — E78.2 MIXED HYPERLIPIDEMIA: ICD-10-CM

## 2024-07-23 PROCEDURE — 3008F BODY MASS INDEX DOCD: CPT | Mod: HCNC,CPTII,S$GLB, | Performed by: NURSE PRACTITIONER

## 2024-07-23 PROCEDURE — 1159F MED LIST DOCD IN RCRD: CPT | Mod: HCNC,CPTII,S$GLB, | Performed by: NURSE PRACTITIONER

## 2024-07-23 PROCEDURE — 3288F FALL RISK ASSESSMENT DOCD: CPT | Mod: HCNC,CPTII,S$GLB, | Performed by: NURSE PRACTITIONER

## 2024-07-23 PROCEDURE — 1101F PT FALLS ASSESS-DOCD LE1/YR: CPT | Mod: HCNC,CPTII,S$GLB, | Performed by: NURSE PRACTITIONER

## 2024-07-23 PROCEDURE — 3078F DIAST BP <80 MM HG: CPT | Mod: HCNC,CPTII,S$GLB, | Performed by: NURSE PRACTITIONER

## 2024-07-23 PROCEDURE — 3044F HG A1C LEVEL LT 7.0%: CPT | Mod: HCNC,CPTII,S$GLB, | Performed by: NURSE PRACTITIONER

## 2024-07-23 PROCEDURE — 99214 OFFICE O/P EST MOD 30 MIN: CPT | Mod: HCNC,S$GLB,, | Performed by: NURSE PRACTITIONER

## 2024-07-23 PROCEDURE — 3066F NEPHROPATHY DOC TX: CPT | Mod: HCNC,CPTII,S$GLB, | Performed by: NURSE PRACTITIONER

## 2024-07-23 PROCEDURE — 3077F SYST BP >= 140 MM HG: CPT | Mod: HCNC,CPTII,S$GLB, | Performed by: NURSE PRACTITIONER

## 2024-07-23 PROCEDURE — 4010F ACE/ARB THERAPY RXD/TAKEN: CPT | Mod: HCNC,CPTII,S$GLB, | Performed by: NURSE PRACTITIONER

## 2024-07-23 PROCEDURE — 1160F RVW MEDS BY RX/DR IN RCRD: CPT | Mod: HCNC,CPTII,S$GLB, | Performed by: NURSE PRACTITIONER

## 2024-07-23 PROCEDURE — 99999 PR PBB SHADOW E&M-EST. PATIENT-LVL V: CPT | Mod: PBBFAC,HCNC,, | Performed by: NURSE PRACTITIONER

## 2024-07-23 PROCEDURE — 1126F AMNT PAIN NOTED NONE PRSNT: CPT | Mod: HCNC,CPTII,S$GLB, | Performed by: NURSE PRACTITIONER

## 2024-07-23 RX ORDER — GLUCAGON INJECTION, SOLUTION 1 MG/.2ML
0.2 INJECTION, SOLUTION SUBCUTANEOUS
Qty: 0.4 ML | Refills: 2 | Status: SHIPPED | OUTPATIENT
Start: 2024-07-23

## 2024-07-23 NOTE — PATIENT INSTRUCTIONS
Gvoke - emergency kit--- for severe lows    Gin 3- [facility]     Lantus 20 units in am    Novolog 8-15 units before meals , scale 180-230+2, etc.     Lab Results   Component Value Date    HGBA1C 6.7 (H) 07/11/2024      Goal less than 7.5%    [email or web address]  Eat fit marion  Stratiopal marion  [email or web address]    [email or web address] marion       Goal  no higher than 200  Fasting < 140     Follow up in 4 months w/Irielle   A1c urine mac in 4 months

## 2024-07-23 NOTE — Clinical Note
Advisement to cut back meal insulin w/ smaller meals, using maycol 3 , sent gvoke, had a scare this week w/ hypo

## 2024-07-23 NOTE — PROGRESS NOTES
CHIEF COMPLAINT: Type 2 Diabetes     HPI: Mr. Ravi Zamorano is a 68 y.o. male who was diagnosed with Type 2 DM in 2000.  S/p kidney transplant 2016  Last seen by me in march 2024.  Being seen by me again today.     Accompanied by wife.  F/u with cards, had ablation in 2024.   F/u with hem/onc, thrombocytopenia   Had bone marrow in summer 2024.   Having false lows.  6/2024, PHUONG, edema.  Still having elevated bp, had hydralazine tid added.  F/ u w/ nephrology.   Had 1st severe this week, bg < 38 mg/dl     Not wearing sensor  Discrepancy with sensor vs fingerstick.   140 mg/dl, < 65 mg/dl   On stable prograf does, pred 5 mg daily   +PN     A1c trending down over the past 6 months     Lab Results   Component Value Date    HGBA1C 6.7 (H) 07/11/2024       Past Medical History:   Diagnosis Date    Anticoagulant long-term use     Anxiety 07/29/2014    Arthritis     atrial fibrillation     Bilateral diabetic retinopathy 2017    CKD (chronic kidney disease) stage 2, GFR 60-89 ml/min 12/28/2016    CKD (chronic kidney disease) stage 4, GFR 15-29 ml/min 07/29/2014    Colon polyps 2014    Depression - situational 07/29/2014    Diabetes mellitus     Diabetes type 2 since 2000 07/29/2014    Diabetic neuropathy 07/29/2014    Encounter for blood transfusion     History of cardioversion 10/03/2019    History of hepatitis C, s/p successful Rx w/ SVR24 - 9/2017 07/29/2014    Genotype 1a, treatment naive 10/2014 liver biopsy - grade 1 / stage 1 Completed Harvoni w/ SVR    Hyperlipidemia 07/29/2014    Hypertension     Neuropathy     Organ transplant candidate 07/29/2014    Pancreatitis     S/P cadaveric kidney transplant 11/5/2016. ESRD secondary to HTN/DMII 11/05/2016    Stage 3a chronic kidney disease 12/28/2016    Type 2 diabetes mellitus with stage 3a chronic kidney disease, with long-term current use of insulin 07/29/2014     Remains on MDI---lantus and humalog     PREVIOUS DIABETES MEDICATIONS  TRIED  lantus  humalog  novolog  levemir  trulicity -wt loss   tradjenta  Metformin   ozempic   Jardiance     CURRENT DIABETIC MEDS: lantus 20 units in the am, novolog 15-15-15 units ac w/ scale 180-230+2, etc, jardiance 10 mg daily   Breakfast, skips lunch, eats dinner  Smoothie-vegan   On MDI (injections 4 x a day)   Makes frequent changes to his/her insulin regimen on the basis of blood glucose data  Testing 4 x a day max   Patient is willing and able to use the device  Demonstrated an understanding of the technology and is motivated to use CGM  Patient expected to adhere to a comprehensive diabetes treatment plan and patient has adequate medical supervision    Has multiple impaired awareness of hypoglycemia (hypoglycemia unawareness)  Needs work with dietary habits    Diabetes Management Status    Statin: Taking  ACE/ARB: Taking    Screening or Prevention Patient's value Goal Complete/Controlled?   HgA1C Testing and Control   Lab Results   Component Value Date    HGBA1C 6.7 (H) 07/11/2024      Annually/Less than 8% Yes   Lipid profile : 07/01/2024 Annually Yes   LDL control Lab Results   Component Value Date    LDLCALC 48.6 (L) 07/01/2024    Annually/Less than 100 mg/dl  Yes   Nephropathy screening Lab Results   Component Value Date    LABMICR 544.0 03/01/2023     Lab Results   Component Value Date    PROTEINUA 1+ (A) 07/11/2024    Annually Yes   Blood pressure BP Readings from Last 1 Encounters:   07/23/24 (!) 150/64    Less than 140/90 No   Dilated retinal exam : 02/09/2024 Annually Yes   Foot exam   : 07/03/2024 Annually No     REVIEW OF SYSTEMS  General: no weakness, fatigue, + weight changes 15# loss   Eyes: no double or blurred vision, eye pain, or redness  Cardiovascular: no chest pain, palpitations, + ankle edema, or murmurs.   Respiratory: no cough or dyspnea.   GI: no heartburn, nausea, or changes in bowel patterns; good appetite.   Skin: no rashes, dryness, itching, or reactions at insulin injection  "sites.  Neuro: + numbness, tingling-gabapentin,RLS, tremors, or vertigo. +loss of strength in legs  Endocrine: no polyuria, polydipsia, polyphagia, heat or cold intolerance.     Vital Signs  BP (!) 150/64 (BP Location: Left arm, Patient Position: Sitting, BP Method: Medium (Manual))   Pulse (!) 55   Ht 6' 1" (1.854 m)   Wt 87 kg (191 lb 12.8 oz)   SpO2 99%   BMI 25.30 kg/m²     Hemoglobin A1C   Date Value Ref Range Status   07/11/2024 6.7 (H) 4.0 - 5.6 % Final     Comment:     ADA Screening Guidelines:  5.7-6.4%  Consistent with prediabetes  >or=6.5%  Consistent with diabetes    High levels of fetal hemoglobin interfere with the HbA1C  assay. Heterozygous hemoglobin variants (HbS, HgC, etc)do  not significantly interfere with this assay.   However, presence of multiple variants may affect accuracy.     05/02/2024 6.8 (H) 4.0 - 5.6 % Final     Comment:     ADA Screening Guidelines:  5.7-6.4%  Consistent with prediabetes  >or=6.5%  Consistent with diabetes    High levels of fetal hemoglobin interfere with the HbA1C  assay. Heterozygous hemoglobin variants (HbS, HgC, etc)do  not significantly interfere with this assay.   However, presence of multiple variants may affect accuracy.     03/11/2024 8.3 (H) 4.0 - 5.6 % Final     Comment:     ADA Screening Guidelines:  5.7-6.4%  Consistent with prediabetes  >or=6.5%  Consistent with diabetes    High levels of fetal hemoglobin interfere with the HbA1C  assay. Heterozygous hemoglobin variants (HbS, HgC, etc)do  not significantly interfere with this assay.   However, presence of multiple variants may affect accuracy.          Chemistry        Component Value Date/Time     (L) 07/11/2024 1644    K 3.6 07/11/2024 1644     07/11/2024 1644    CO2 22 (L) 07/11/2024 1644    BUN 35 (H) 07/11/2024 1644    CREATININE 2.2 (H) 07/11/2024 1644     07/11/2024 1644        Component Value Date/Time    CALCIUM 9.5 07/11/2024 1644    ALKPHOS 45 (L) 06/16/2024 0408    AST " 14 06/16/2024 0408    ALT <5 (L) 06/16/2024 0408    BILITOT 0.6 06/16/2024 0408    ESTGFRAFRICA 51.9 (A) 07/19/2022 0723    EGFRNONAA 44.9 (A) 07/19/2022 0723           Lab Results   Component Value Date    TSH 0.924 04/01/2024      Lab Results   Component Value Date    CHOL 112 (L) 07/01/2024    CHOL 131 06/07/2023    CHOL 132 03/01/2023     Lab Results   Component Value Date    HDL 26 (L) 07/01/2024    HDL 31 (L) 06/07/2023    HDL 33 (L) 03/01/2023     Lab Results   Component Value Date    LDLCALC 48.6 (L) 07/01/2024    LDLCALC 61.4 (L) 06/07/2023    LDLCALC 71.4 03/01/2023     Lab Results   Component Value Date    TRIG 187 (H) 07/01/2024    TRIG 193 (H) 06/07/2023    TRIG 138 03/01/2023     Lab Results   Component Value Date    CHOLHDL 23.2 07/01/2024    CHOLHDL 23.7 06/07/2023    CHOLHDL 25.0 03/01/2023       PHYSICAL EXAMINATION  Constitutional: Appears well, no distress. Reviewed vitals above.  Eyes: conjunctivae & lids intact; PERRLA, EOMs intact; optic discs   Neck: Supple, trachea midline.   Respiratory: no wheezes, even and unlabored  Cardiovascular: RRR; s1s2   Lymph: deferred   Skin: warm and dry; no injection site reactions, no acanthosis nigracans observed.  Neuro:patient alert and cooperative, normal affect; steady gait.  Psychiatric: judgement & insight intact, orientation of time, place & person intact, memory; mood & affect wnl     Diabetes Foot Exam:   Deferred     Assessment/Plan    1. Type 2 diabetes mellitus with stage 3a chronic kidney disease, with long-term current use of insulin  Hemoglobin A1C next time   A1c goal less than 7.5%  Watch prandial, cut to 8 units for smaller meals   Continue 15 units ac w/ scale 180-230+2 ,etc.  Continue lantus 20 units in am  Jardiance 10 mg daily   64 oz of less for water intake  Watching salt intake, doing substitutions  F/u with KTS   A1c at goal  Using GlySens 3-ccs medical       2. Adverse effect of glucocorticoid or synthetic analogue, subsequent  encounter  On stable dose, may affect insulin resistance       3. Adverse effect of immunosuppressive drug, subsequent encounter  On tacrolimus, managed per KTS  May increase insulin resistance      4. Claudication of both lower extremities  F/u with vascular, stable      5. Anticoagulant long-term use  On xarelto, off eliquis      6. Diabetic polyneuropathy associated with type 2 diabetes mellitus  F/u with podiatry, on gabapentin      7. Essential (primary) hypertension  Bp elevated, managed per nephrology/kts , on arb, hydralazine       8. Mixed hyperlipidemia  On statin, stable      9. PAD (peripheral artery disease)  F/u with vascular, stable       10. Paroxysmal A-fib  Had ablation in  , stable, on xarelto      11. Stage 3a chronic kidney disease  F/u with nephrology, on jardiance       12. Status post -donor kidney transplantation  See above       13. Thrombocytopenia, unspecified  F/u with hem/onc        FOLLOW UP  In 4 months

## 2024-07-24 ENCOUNTER — TELEPHONE (OUTPATIENT)
Dept: ELECTROPHYSIOLOGY | Facility: CLINIC | Age: 69
End: 2024-07-24
Payer: MEDICARE

## 2024-07-31 ENCOUNTER — LAB VISIT (OUTPATIENT)
Dept: LAB | Facility: HOSPITAL | Age: 69
End: 2024-07-31
Attending: INTERNAL MEDICINE
Payer: MEDICARE

## 2024-07-31 DIAGNOSIS — Z94.0 KIDNEY REPLACED BY TRANSPLANT: ICD-10-CM

## 2024-07-31 LAB — TACROLIMUS BLD-MCNC: 4 NG/ML (ref 5–15)

## 2024-07-31 PROCEDURE — 80197 ASSAY OF TACROLIMUS: CPT | Mod: HCNC | Performed by: INTERNAL MEDICINE

## 2024-07-31 PROCEDURE — 36415 COLL VENOUS BLD VENIPUNCTURE: CPT | Mod: HCNC | Performed by: INTERNAL MEDICINE

## 2024-08-06 ENCOUNTER — OFFICE VISIT (OUTPATIENT)
Dept: OPTOMETRY | Facility: CLINIC | Age: 69
End: 2024-08-06
Payer: MEDICARE

## 2024-08-06 ENCOUNTER — LAB VISIT (OUTPATIENT)
Dept: LAB | Facility: HOSPITAL | Age: 69
End: 2024-08-06
Attending: INTERNAL MEDICINE
Payer: MEDICARE

## 2024-08-06 DIAGNOSIS — E11.3393 TYPE 2 DIABETES MELLITUS WITH BOTH EYES AFFECTED BY MODERATE NONPROLIFERATIVE RETINOPATHY WITHOUT MACULAR EDEMA, WITHOUT LONG-TERM CURRENT USE OF INSULIN: ICD-10-CM

## 2024-08-06 DIAGNOSIS — H47.393 OPTIC NERVE CUPPING OF BOTH EYES: ICD-10-CM

## 2024-08-06 DIAGNOSIS — Z98.42 STATUS POST CATARACT EXTRACTION OF BOTH EYES WITH INSERTION OF INTRAOCULAR LENS: ICD-10-CM

## 2024-08-06 DIAGNOSIS — Z96.1 PSEUDOPHAKIA OF BOTH EYES: ICD-10-CM

## 2024-08-06 DIAGNOSIS — N18.31 TYPE 2 DIABETES MELLITUS WITH STAGE 3A CHRONIC KIDNEY DISEASE, WITH LONG-TERM CURRENT USE OF INSULIN: Primary | ICD-10-CM

## 2024-08-06 DIAGNOSIS — Z79.4 TYPE 2 DIABETES MELLITUS WITH STAGE 3A CHRONIC KIDNEY DISEASE, WITH LONG-TERM CURRENT USE OF INSULIN: Primary | ICD-10-CM

## 2024-08-06 DIAGNOSIS — E13.319 RETINAL HEMORRHAGE DUE TO SECONDARY DIABETES: ICD-10-CM

## 2024-08-06 DIAGNOSIS — A04.8 H. PYLORI INFECTION: ICD-10-CM

## 2024-08-06 DIAGNOSIS — H35.049 RETINAL HEMORRHAGE DUE TO SECONDARY DIABETES: ICD-10-CM

## 2024-08-06 DIAGNOSIS — I10 ESSENTIAL (PRIMARY) HYPERTENSION: ICD-10-CM

## 2024-08-06 DIAGNOSIS — E11.22 TYPE 2 DIABETES MELLITUS WITH STAGE 3A CHRONIC KIDNEY DISEASE, WITH LONG-TERM CURRENT USE OF INSULIN: Primary | ICD-10-CM

## 2024-08-06 DIAGNOSIS — Z96.1 STATUS POST CATARACT EXTRACTION OF BOTH EYES WITH INSERTION OF INTRAOCULAR LENS: ICD-10-CM

## 2024-08-06 DIAGNOSIS — Z98.41 STATUS POST CATARACT EXTRACTION OF BOTH EYES WITH INSERTION OF INTRAOCULAR LENS: ICD-10-CM

## 2024-08-06 PROCEDURE — 99999 PR PBB SHADOW E&M-EST. PATIENT-LVL III: CPT | Mod: PBBFAC,HCNC,, | Performed by: OPTOMETRIST

## 2024-08-06 PROCEDURE — 87338 HPYLORI STOOL AG IA: CPT | Mod: HCNC | Performed by: INTERNAL MEDICINE

## 2024-08-07 ENCOUNTER — LAB VISIT (OUTPATIENT)
Dept: LAB | Facility: HOSPITAL | Age: 69
End: 2024-08-07
Payer: MEDICARE

## 2024-08-07 DIAGNOSIS — D63.8 ANEMIA OF CHRONIC DISEASE: ICD-10-CM

## 2024-08-07 DIAGNOSIS — E11.42 DIABETIC POLYNEUROPATHY ASSOCIATED WITH TYPE 2 DIABETES MELLITUS: ICD-10-CM

## 2024-08-07 LAB
HCT VFR BLD AUTO: 37.7 % (ref 40–54)
HGB BLD-MCNC: 10.1 G/DL (ref 14–18)
IRON SERPL-MCNC: 77 UG/DL (ref 45–160)
SATURATED IRON: 34 % (ref 20–50)
TOTAL IRON BINDING CAPACITY: 226 UG/DL (ref 250–450)
TRANSFERRIN SERPL-MCNC: 153 MG/DL (ref 200–375)

## 2024-08-07 PROCEDURE — 85014 HEMATOCRIT: CPT | Mod: HCNC | Performed by: STUDENT IN AN ORGANIZED HEALTH CARE EDUCATION/TRAINING PROGRAM

## 2024-08-07 PROCEDURE — 85018 HEMOGLOBIN: CPT | Mod: HCNC | Performed by: STUDENT IN AN ORGANIZED HEALTH CARE EDUCATION/TRAINING PROGRAM

## 2024-08-07 PROCEDURE — 83540 ASSAY OF IRON: CPT | Mod: HCNC | Performed by: STUDENT IN AN ORGANIZED HEALTH CARE EDUCATION/TRAINING PROGRAM

## 2024-08-07 PROCEDURE — 36415 COLL VENOUS BLD VENIPUNCTURE: CPT | Mod: HCNC | Performed by: STUDENT IN AN ORGANIZED HEALTH CARE EDUCATION/TRAINING PROGRAM

## 2024-08-07 RX ORDER — GABAPENTIN 300 MG/1
CAPSULE ORAL
Qty: 450 CAPSULE | Refills: 3 | Status: SHIPPED | OUTPATIENT
Start: 2024-08-07

## 2024-08-13 LAB — H PYLORI AG STL QL IA: NOT DETECTED

## 2024-08-21 ENCOUNTER — LAB VISIT (OUTPATIENT)
Dept: LAB | Facility: HOSPITAL | Age: 69
End: 2024-08-21
Payer: MEDICARE

## 2024-08-21 ENCOUNTER — OFFICE VISIT (OUTPATIENT)
Dept: INTERNAL MEDICINE | Facility: CLINIC | Age: 69
End: 2024-08-21
Payer: MEDICARE

## 2024-08-21 VITALS
DIASTOLIC BLOOD PRESSURE: 70 MMHG | OXYGEN SATURATION: 97 % | WEIGHT: 195.75 LBS | HEART RATE: 52 BPM | BODY MASS INDEX: 25.94 KG/M2 | SYSTOLIC BLOOD PRESSURE: 132 MMHG | HEIGHT: 73 IN

## 2024-08-21 DIAGNOSIS — I10 ESSENTIAL (PRIMARY) HYPERTENSION: Primary | ICD-10-CM

## 2024-08-21 DIAGNOSIS — E78.2 MIXED HYPERLIPIDEMIA: ICD-10-CM

## 2024-08-21 DIAGNOSIS — E11.3393 TYPE 2 DIABETES MELLITUS WITH BOTH EYES AFFECTED BY MODERATE NONPROLIFERATIVE RETINOPATHY WITHOUT MACULAR EDEMA, WITHOUT LONG-TERM CURRENT USE OF INSULIN: ICD-10-CM

## 2024-08-21 DIAGNOSIS — Z94.0 KIDNEY REPLACED BY TRANSPLANT: ICD-10-CM

## 2024-08-21 DIAGNOSIS — N18.32 STAGE 3B CHRONIC KIDNEY DISEASE: ICD-10-CM

## 2024-08-21 DIAGNOSIS — D63.8 ANEMIA OF CHRONIC DISEASE: ICD-10-CM

## 2024-08-21 DIAGNOSIS — I48.0 PAROXYSMAL A-FIB: ICD-10-CM

## 2024-08-21 DIAGNOSIS — I73.9 PAD (PERIPHERAL ARTERY DISEASE): ICD-10-CM

## 2024-08-21 LAB
HCT VFR BLD AUTO: 35.2 % (ref 40–54)
HGB BLD-MCNC: 9.6 G/DL (ref 14–18)
IRON SERPL-MCNC: 57 UG/DL (ref 45–160)
SATURATED IRON: 28 % (ref 20–50)
TOTAL IRON BINDING CAPACITY: 207 UG/DL (ref 250–450)
TRANSFERRIN SERPL-MCNC: 140 MG/DL (ref 200–375)

## 2024-08-21 PROCEDURE — 1160F RVW MEDS BY RX/DR IN RCRD: CPT | Mod: HCNC,CPTII,S$GLB, | Performed by: INTERNAL MEDICINE

## 2024-08-21 PROCEDURE — 1159F MED LIST DOCD IN RCRD: CPT | Mod: HCNC,CPTII,S$GLB, | Performed by: INTERNAL MEDICINE

## 2024-08-21 PROCEDURE — 1101F PT FALLS ASSESS-DOCD LE1/YR: CPT | Mod: HCNC,CPTII,S$GLB, | Performed by: INTERNAL MEDICINE

## 2024-08-21 PROCEDURE — 99214 OFFICE O/P EST MOD 30 MIN: CPT | Mod: HCNC,S$GLB,, | Performed by: INTERNAL MEDICINE

## 2024-08-21 PROCEDURE — 83540 ASSAY OF IRON: CPT | Mod: HCNC | Performed by: STUDENT IN AN ORGANIZED HEALTH CARE EDUCATION/TRAINING PROGRAM

## 2024-08-21 PROCEDURE — 1126F AMNT PAIN NOTED NONE PRSNT: CPT | Mod: HCNC,CPTII,S$GLB, | Performed by: INTERNAL MEDICINE

## 2024-08-21 PROCEDURE — 36415 COLL VENOUS BLD VENIPUNCTURE: CPT | Mod: HCNC | Performed by: STUDENT IN AN ORGANIZED HEALTH CARE EDUCATION/TRAINING PROGRAM

## 2024-08-21 PROCEDURE — 3288F FALL RISK ASSESSMENT DOCD: CPT | Mod: HCNC,CPTII,S$GLB, | Performed by: INTERNAL MEDICINE

## 2024-08-21 PROCEDURE — 99999 PR PBB SHADOW E&M-EST. PATIENT-LVL V: CPT | Mod: PBBFAC,HCNC,, | Performed by: INTERNAL MEDICINE

## 2024-08-21 PROCEDURE — 85018 HEMOGLOBIN: CPT | Mod: HCNC | Performed by: STUDENT IN AN ORGANIZED HEALTH CARE EDUCATION/TRAINING PROGRAM

## 2024-08-21 PROCEDURE — 3066F NEPHROPATHY DOC TX: CPT | Mod: HCNC,CPTII,S$GLB, | Performed by: INTERNAL MEDICINE

## 2024-08-21 PROCEDURE — 3008F BODY MASS INDEX DOCD: CPT | Mod: HCNC,CPTII,S$GLB, | Performed by: INTERNAL MEDICINE

## 2024-08-21 PROCEDURE — 3044F HG A1C LEVEL LT 7.0%: CPT | Mod: HCNC,CPTII,S$GLB, | Performed by: INTERNAL MEDICINE

## 2024-08-21 PROCEDURE — 4010F ACE/ARB THERAPY RXD/TAKEN: CPT | Mod: HCNC,CPTII,S$GLB, | Performed by: INTERNAL MEDICINE

## 2024-08-21 PROCEDURE — 3078F DIAST BP <80 MM HG: CPT | Mod: HCNC,CPTII,S$GLB, | Performed by: INTERNAL MEDICINE

## 2024-08-21 PROCEDURE — 3075F SYST BP GE 130 - 139MM HG: CPT | Mod: HCNC,CPTII,S$GLB, | Performed by: INTERNAL MEDICINE

## 2024-08-21 PROCEDURE — 85014 HEMATOCRIT: CPT | Mod: HCNC | Performed by: STUDENT IN AN ORGANIZED HEALTH CARE EDUCATION/TRAINING PROGRAM

## 2024-08-21 NOTE — PROGRESS NOTES
"Subjective:       Patient ID: Ravi Zamorano is a 68 y.o. male.    Chief Complaint: Follow-up    HPI  68 y.o. male here for follow up of      Atrial fibrillation s/p CTI/PVI/PWI 6/11/2024  HFpEF  Hypertension  Hyperlipidemia - lipitor  pAF (on eliquis)  PAD   IDDM  ESRD s/p kidney transplant 2016 now with CKD 3  Anemia  DM2        Underwent RFA 6/11/24; started on multaq. Admitted 6/15-6/15 for acute diastolic HF.   Lasix 40mg daily  Added hydralazine 25mg q12h on 6/19. Increased to 50mg q8h on 7/10/24.   Continue Valsartan 320 mg daily   Continue Coreg 25 mg bid  Continue Spironolactone 25 mg daily   Has allergy to Nifedipine. Caused gingival hyperplasia.   No cilostazole due to possible interaction w flecainide.     Lab Results   Component Value Date    HGBA1C 6.7 (H) 07/11/2024    HGBA1C 6.8 (H) 05/02/2024    HGBA1C 8.3 (H) 03/11/2024       Novolog 15 u tid meals  Lantus 20 u qam     CKD 3b post renal transplant 2016  Most recent GFR 29 - improved from 25 (lasix was held at that time)  Last nephrology visit  7/11/24    Bone marrow biopsy on 6/26 for anemia.         BONE MARROW, RIGHT ILIAC CREST (ASPIRATE SMEAR, TOUCH IMPRINT, CLOT SECTION, AND CORE BIOPSY):  -- HYPERCELLULAR MARROW FOR AGE (60%) WITH TRILINEAGE HEMATOPOIESIS, ERYTHROID HYPERPLASIA AND MILD MEGAKARYOCYTIC HYPERPLASIA.  -- ADEQUATE STORAGE IRON.           Cost issues with multaq and xarelto. On last month of multaq.   Review of Systems   Constitutional:  Negative for fever.   Respiratory:  Negative for shortness of breath.    Cardiovascular:  Negative for chest pain.   Musculoskeletal: Negative.    Skin: Negative.        Objective:   /70 (BP Location: Left arm, Patient Position: Sitting, BP Method: Medium (Manual))   Pulse (!) 52   Ht 6' 1" (1.854 m)   Wt 88.8 kg (195 lb 12.3 oz)   SpO2 97%   BMI 25.83 kg/m²      Physical Exam  Constitutional:       General: He is not in acute distress.     Appearance: He is well-developed. He is not " diaphoretic.   HENT:      Head: Normocephalic and atraumatic.   Cardiovascular:      Rate and Rhythm: Normal rate and regular rhythm.      Heart sounds: No murmur heard.  Pulmonary:      Effort: Pulmonary effort is normal. No respiratory distress.      Breath sounds: No wheezing or rales.   Skin:     General: Skin is warm and dry.   Neurological:      Mental Status: He is alert and oriented to person, place, and time.   Psychiatric:         Behavior: Behavior normal.         Assessment:       1. Essential (primary) hypertension    2. Paroxysmal A-fib    3. Mixed hyperlipidemia    4. PAD (peripheral artery disease)    5. Stage 3b chronic kidney disease    6. Kidney replaced by transplant    7. Type 2 diabetes mellitus with both eyes affected by moderate nonproliferative retinopathy without macular edema, without long-term current use of insulin        Plan:       Essential (primary) hypertension  - stable and controlled  - continue current regimen    Paroxysmal A-fib  -     Ambulatory referral/consult to Pharmacy Assistance; Future; Expected date: 08/28/2024  -     rivaroxaban (XARELTO) 15 mg Tab; Take 1 tablet (15 mg total) by mouth daily with dinner or evening meal.  Dispense: 30 tablet; Refill: 11    Mixed hyperlipidemia  PAD (peripheral artery disease)  Stage 3b chronic kidney disease  Kidney replaced by transplant  Type 2 diabetes mellitus with both eyes affected by moderate nonproliferative retinopathy without macular edema, without long-term current use of insulin  - stable and controlled  - continue current regimen          You are up to date for your primary preventive health care, and there are no reminders at this time.

## 2024-08-22 ENCOUNTER — TELEPHONE (OUTPATIENT)
Dept: PHARMACY | Facility: CLINIC | Age: 69
End: 2024-08-22
Payer: MEDICARE

## 2024-08-22 NOTE — TELEPHONE ENCOUNTER
July 10, 2024  Eleanor Prasad    7/10/24  4:30 PM  Note  We have reached out to Maxwell Jaun to inform him of the [employer]  application process for Eliquis and whats required to apply.  He did not answer. Voicemail was left,  voicemail left .   Follow-up will be made in 5 business days.     Eleanor Prasad  Pharmacy Patient Assistance Team

## 2024-08-23 ENCOUNTER — CLINICAL SUPPORT (OUTPATIENT)
Dept: NEPHROLOGY | Facility: CLINIC | Age: 69
End: 2024-08-23
Payer: MEDICARE

## 2024-08-23 DIAGNOSIS — D64.9 SYMPTOMATIC ANEMIA: ICD-10-CM

## 2024-08-23 DIAGNOSIS — N18.30 STAGE 3 CHRONIC KIDNEY DISEASE, UNSPECIFIED WHETHER STAGE 3A OR 3B CKD: Primary | ICD-10-CM

## 2024-08-23 PROCEDURE — 99999 PR PBB SHADOW E&M-EST. PATIENT-LVL II: CPT | Mod: PBBFAC,HCNC,,

## 2024-08-29 ENCOUNTER — OFFICE VISIT (OUTPATIENT)
Dept: NEPHROLOGY | Facility: CLINIC | Age: 69
End: 2024-08-29
Payer: MEDICARE

## 2024-08-29 ENCOUNTER — LAB VISIT (OUTPATIENT)
Dept: LAB | Facility: HOSPITAL | Age: 69
End: 2024-08-29
Payer: MEDICARE

## 2024-08-29 VITALS
WEIGHT: 198 LBS | SYSTOLIC BLOOD PRESSURE: 162 MMHG | OXYGEN SATURATION: 99 % | DIASTOLIC BLOOD PRESSURE: 75 MMHG | HEART RATE: 55 BPM | BODY MASS INDEX: 26.12 KG/M2

## 2024-08-29 DIAGNOSIS — D84.821 IMMUNOCOMPROMISED STATE DUE TO DRUG THERAPY: ICD-10-CM

## 2024-08-29 DIAGNOSIS — R80.9 PROTEINURIA, UNSPECIFIED TYPE: ICD-10-CM

## 2024-08-29 DIAGNOSIS — N18.32 STAGE 3B CHRONIC KIDNEY DISEASE: Primary | ICD-10-CM

## 2024-08-29 DIAGNOSIS — I15.0 RENOVASCULAR HYPERTENSION: ICD-10-CM

## 2024-08-29 DIAGNOSIS — Z79.899 IMMUNOCOMPROMISED STATE DUE TO DRUG THERAPY: ICD-10-CM

## 2024-08-29 DIAGNOSIS — N18.32 STAGE 3B CHRONIC KIDNEY DISEASE: ICD-10-CM

## 2024-08-29 DIAGNOSIS — E83.9 CHRONIC KIDNEY DISEASE-MINERAL BONE DISORDER (CKD-MBD) WITH STAGE 3B CHRONIC KIDNEY DISEASE: ICD-10-CM

## 2024-08-29 DIAGNOSIS — Z94.0 S/P KIDNEY TRANSPLANT: ICD-10-CM

## 2024-08-29 DIAGNOSIS — N18.32 CHRONIC KIDNEY DISEASE-MINERAL BONE DISORDER (CKD-MBD) WITH STAGE 3B CHRONIC KIDNEY DISEASE: ICD-10-CM

## 2024-08-29 DIAGNOSIS — M89.9 CHRONIC KIDNEY DISEASE-MINERAL BONE DISORDER (CKD-MBD) WITH STAGE 3B CHRONIC KIDNEY DISEASE: ICD-10-CM

## 2024-08-29 LAB
ALBUMIN SERPL BCP-MCNC: 3 G/DL (ref 3.5–5.2)
ANION GAP SERPL CALC-SCNC: 8 MMOL/L (ref 8–16)
BUN SERPL-MCNC: 22 MG/DL (ref 8–23)
CALCIUM SERPL-MCNC: 9.1 MG/DL (ref 8.7–10.5)
CHLORIDE SERPL-SCNC: 102 MMOL/L (ref 95–110)
CO2 SERPL-SCNC: 24 MMOL/L (ref 23–29)
CREAT SERPL-MCNC: 1.9 MG/DL (ref 0.5–1.4)
ERYTHROCYTE [DISTWIDTH] IN BLOOD BY AUTOMATED COUNT: 15.9 % (ref 11.5–14.5)
EST. GFR  (NO RACE VARIABLE): 38.4 ML/MIN/1.73 M^2
GLUCOSE SERPL-MCNC: 69 MG/DL (ref 70–110)
HCT VFR BLD AUTO: 35.8 % (ref 40–54)
HGB BLD-MCNC: 10 G/DL (ref 14–18)
MCH RBC QN AUTO: 22.9 PG (ref 27–31)
MCHC RBC AUTO-ENTMCNC: 27.9 G/DL (ref 32–36)
MCV RBC AUTO: 82 FL (ref 82–98)
PHOSPHATE SERPL-MCNC: 3.5 MG/DL (ref 2.7–4.5)
PLATELET # BLD AUTO: 140 K/UL (ref 150–450)
PMV BLD AUTO: ABNORMAL FL (ref 9.2–12.9)
POTASSIUM SERPL-SCNC: 4.4 MMOL/L (ref 3.5–5.1)
RBC # BLD AUTO: 4.36 M/UL (ref 4.6–6.2)
SODIUM SERPL-SCNC: 134 MMOL/L (ref 136–145)
WBC # BLD AUTO: 6.04 K/UL (ref 3.9–12.7)

## 2024-08-29 PROCEDURE — 4010F ACE/ARB THERAPY RXD/TAKEN: CPT | Mod: HCNC,CPTII,GC,S$GLB | Performed by: STUDENT IN AN ORGANIZED HEALTH CARE EDUCATION/TRAINING PROGRAM

## 2024-08-29 PROCEDURE — 36415 COLL VENOUS BLD VENIPUNCTURE: CPT | Mod: HCNC | Performed by: STUDENT IN AN ORGANIZED HEALTH CARE EDUCATION/TRAINING PROGRAM

## 2024-08-29 PROCEDURE — 3066F NEPHROPATHY DOC TX: CPT | Mod: HCNC,CPTII,GC,S$GLB | Performed by: STUDENT IN AN ORGANIZED HEALTH CARE EDUCATION/TRAINING PROGRAM

## 2024-08-29 PROCEDURE — 1101F PT FALLS ASSESS-DOCD LE1/YR: CPT | Mod: HCNC,CPTII,GC,S$GLB | Performed by: STUDENT IN AN ORGANIZED HEALTH CARE EDUCATION/TRAINING PROGRAM

## 2024-08-29 PROCEDURE — 3077F SYST BP >= 140 MM HG: CPT | Mod: HCNC,CPTII,GC,S$GLB | Performed by: STUDENT IN AN ORGANIZED HEALTH CARE EDUCATION/TRAINING PROGRAM

## 2024-08-29 PROCEDURE — 80069 RENAL FUNCTION PANEL: CPT | Mod: HCNC | Performed by: STUDENT IN AN ORGANIZED HEALTH CARE EDUCATION/TRAINING PROGRAM

## 2024-08-29 PROCEDURE — 1159F MED LIST DOCD IN RCRD: CPT | Mod: HCNC,CPTII,GC,S$GLB | Performed by: STUDENT IN AN ORGANIZED HEALTH CARE EDUCATION/TRAINING PROGRAM

## 2024-08-29 PROCEDURE — 85027 COMPLETE CBC AUTOMATED: CPT | Mod: HCNC | Performed by: STUDENT IN AN ORGANIZED HEALTH CARE EDUCATION/TRAINING PROGRAM

## 2024-08-29 PROCEDURE — 3288F FALL RISK ASSESSMENT DOCD: CPT | Mod: HCNC,CPTII,GC,S$GLB | Performed by: STUDENT IN AN ORGANIZED HEALTH CARE EDUCATION/TRAINING PROGRAM

## 2024-08-29 PROCEDURE — 99214 OFFICE O/P EST MOD 30 MIN: CPT | Mod: HCNC,GC,S$GLB, | Performed by: STUDENT IN AN ORGANIZED HEALTH CARE EDUCATION/TRAINING PROGRAM

## 2024-08-29 PROCEDURE — 99999 PR PBB SHADOW E&M-EST. PATIENT-LVL IV: CPT | Mod: PBBFAC,HCNC,GC, | Performed by: STUDENT IN AN ORGANIZED HEALTH CARE EDUCATION/TRAINING PROGRAM

## 2024-08-29 PROCEDURE — 80197 ASSAY OF TACROLIMUS: CPT | Mod: HCNC | Performed by: STUDENT IN AN ORGANIZED HEALTH CARE EDUCATION/TRAINING PROGRAM

## 2024-08-29 PROCEDURE — 3044F HG A1C LEVEL LT 7.0%: CPT | Mod: HCNC,CPTII,GC,S$GLB | Performed by: STUDENT IN AN ORGANIZED HEALTH CARE EDUCATION/TRAINING PROGRAM

## 2024-08-29 PROCEDURE — 3008F BODY MASS INDEX DOCD: CPT | Mod: HCNC,CPTII,GC,S$GLB | Performed by: STUDENT IN AN ORGANIZED HEALTH CARE EDUCATION/TRAINING PROGRAM

## 2024-08-29 PROCEDURE — 3078F DIAST BP <80 MM HG: CPT | Mod: HCNC,CPTII,GC,S$GLB | Performed by: STUDENT IN AN ORGANIZED HEALTH CARE EDUCATION/TRAINING PROGRAM

## 2024-08-29 PROCEDURE — 1126F AMNT PAIN NOTED NONE PRSNT: CPT | Mod: HCNC,CPTII,GC,S$GLB | Performed by: STUDENT IN AN ORGANIZED HEALTH CARE EDUCATION/TRAINING PROGRAM

## 2024-08-29 RX ORDER — ERGOCALCIFEROL 1.25 MG/1
50000 CAPSULE ORAL
Qty: 4 CAPSULE | Refills: 2 | Status: SHIPPED | OUTPATIENT
Start: 2024-08-29 | End: 2024-10-29

## 2024-08-29 NOTE — PROGRESS NOTES
I have reviewed the notes, assessments, and/or procedures performed by Dr. Lopez, I concur with her/his documentation of Ravi Zamorano.  Date of Service: 8/29/2024    67 y/o male with PMH of ESRD likely 2/2 HTN, DM s/p DBD kidney txp in 2016. Stable Cr. Will give vit D 50,000 units weekly for a total 12 doses for vit D deficiency.

## 2024-08-29 NOTE — PROGRESS NOTES
Subjective     Chief Complaint: CKD 3    History of Present Illness:  Mr. Ravi Zamorano is a 68 y.o. male with known ESRD 2/2 DM & HTN and who was previously on HD and then obtained a DBD kidney transplant (), post-kidney transplant CKD 3b, Hepatitis C (treated ), afib s/p cardioversion (), chronic diastolic heart failure, diabetic neuropathy & retinopathy, BPH, erectile dysfunction, and anxiety/depression.             Kidney transplant ()  Donor Type:   - Brain Death  Donor CMV Status:  Positive  Donor HBcAB:  Negative  Donor HCV Status:  Positive  Transplant nephrologist: Dr. Sosa   Transplant medications: Tacrolimus 0.5mg twice daily, Prednisone 5mg daily, and Mycophenolate 250mg 4 caps twice daily   Last Tacrolimus level was 4.5 on 2022 at 0800         Office Visit 24--  He reports compliance with his immunosuppressive medications of  mg BID, prednisone 5 mg daily, and Tacrolimus 0.5 mg every other day (recently decreased from daily dosing for high troughs). He reports foam in his urine, but denies any pain, hematuria, fevers, urinary retention, or ongoing edema. Started on EPO last office visit, received his first dose last week.     #CKD 3b-- Previously stable with creatinine in the 1.7 range, however he was hospitalized in May (symptomatic anemia and 2024 for fluid overload). After these hospitalizations, his creatinine increased to 2.2  Meds; Jardiance 10 mg Daily, Valsartan 320 mg Daily.     #Anemia in chronic renal disease-- On EPO, iron studies within desired range.     #DBD Kidney transplant -- Maintained on Tacrlimus 0.5mg Q48H, Prednisone 5mg QD, and  mg BID.     #Bone Mineral Disease-- Calcium 9.5, Phosphorus 4.3, , Vitamin D 17    #HTN-- Blood pressures under 120/70 at home  Meds; Valsartan 320 mg QD, Hydralazine 50 mg Q8H, Lasix 40 QD, Jardiance 10 mg QD, Coreg 25 mg BID.     #HLD-- Most recent lipid profile showed LDL of 48,  HDL 26, Triglycerides 187  Meds; Lipitor 40 mg QD    #Diabetes-- On insulin, A1C at 6.7    Review of Systems   Constitutional:  Negative for chills, diaphoresis, fever and weight loss.   Respiratory:  Negative for cough, shortness of breath and wheezing.    Cardiovascular:  Positive for claudication. Negative for chest pain, palpitations, orthopnea, leg swelling and PND.   Gastrointestinal:  Negative for abdominal pain, constipation, diarrhea, heartburn, nausea and vomiting.   Genitourinary:  Negative for dysuria and urgency.        Foamy urine   Musculoskeletal:  Negative for back pain, falls and neck pain.   Skin:  Negative for itching and rash.   Neurological:  Negative for dizziness, tremors, focal weakness, loss of consciousness, weakness and headaches.   Endo/Heme/Allergies:  Negative for polydipsia.       PAST HISTORY:     Past Medical History:   Diagnosis Date    Anticoagulant long-term use     Anxiety 07/29/2014    Arthritis     atrial fibrillation     Bilateral diabetic retinopathy 2017    CKD (chronic kidney disease) stage 2, GFR 60-89 ml/min 12/28/2016    CKD (chronic kidney disease) stage 4, GFR 15-29 ml/min 07/29/2014    Colon polyps 2014    Depression - situational 07/29/2014    Diabetes mellitus     Diabetes type 2 since 2000 07/29/2014    Diabetic neuropathy 07/29/2014    Encounter for blood transfusion     History of cardioversion 10/03/2019    History of hepatitis C, s/p successful Rx w/ SVR24 - 9/2017 07/29/2014    Genotype 1a, treatment naive 10/2014 liver biopsy - grade 1 / stage 1 Completed Harvoni w/ SVR    Hyperlipidemia 07/29/2014    Hypertension     Neuropathy     Organ transplant candidate 07/29/2014    Pancreatitis     S/P cadaveric kidney transplant 11/5/2016. ESRD secondary to HTN/DMII 11/05/2016    Stage 3a chronic kidney disease 12/28/2016    Type 2 diabetes mellitus with stage 3a chronic kidney disease, with long-term current use of insulin 07/29/2014       Past Surgical History:    Procedure Laterality Date    ABLATION OF ARRHYTHMOGENIC FOCUS FOR ATRIAL FIBRILLATION N/A 6/11/2024    Procedure: Ablation atrial fibrillation;  Surgeon: Bronson Bowden MD;  Location: [facility] EP LAB;  Service: Cardiology;  Laterality: N/A;  AF, FELICIANO (cx if SR), PVI, RFA, Carto, Gen, GP, 3 Prep    ABLATION, ATRIAL FLUTTER, TYPICAL  6/11/2024    Procedure: Ablation, Atrial Flutter, Typical;  Surgeon: Bronson Bowden MD;  Location: [facility] EP LAB;  Service: Cardiology;;    APPENDECTOMY      BONE MARROW BIOPSY Left 6/26/2024    Procedure: Biopsy-bone marrow;  Surgeon: Bridger Zapata MD;  Location: [facility] ENDO (4TH FLR);  Service: Oncology;  Laterality: Left;  6/24-pt knows to hold aspirin and eliquis as instructed by hem/onc, precall complete-Kpvt    CARDIOVERSION  6/11/2024    Procedure: Cardioversion;  Surgeon: Bronson Bowden MD;  Location: [facility] EP LAB;  Service: Cardiology;;    CATARACT EXTRACTION W/  INTRAOCULAR LENS IMPLANT Right 3/13/2024    Procedure: EXTRACTION, CATARACT, WITH IOL INSERTION;  Surgeon: Jennifer Palacio MD;  Location: [facility] OR;  Service: Ophthalmology;  Laterality: Right;    CATARACT EXTRACTION W/  INTRAOCULAR LENS IMPLANT Left 4/10/2024    Procedure: EXTRACTION, CATARACT, WITH IOL INSERTION;  Surgeon: Jennifer Palacio MD;  Location: [facility] OR;  Service: Ophthalmology;  Laterality: Left;    COLONOSCOPY N/A 12/21/2020    Procedure: COLONOSCOPY;  Surgeon: Tico Bell MD;  Location: [facility] ENDO (4TH FLR);  Service: Endoscopy;  Laterality: N/A;  ok to hold eliquis per Dr. Valadez, see telephone encounter 11/13/2020-MS  covid test 12/18 [place]    KIDNEY TRANSPLANT      TRANSESOPHAGEAL ECHOCARDIOGRAPHY N/A 8/26/2019    Procedure: ECHOCARDIOGRAM, TRANSESOPHAGEAL;  Surgeon: Dolores Diagnostic Provider;  Location: [facility] EP LAB;  Service: Cardiology;  Laterality: N/A;    TRANSESOPHAGEAL ECHOCARDIOGRAPHY N/A 6/11/2024    Procedure: ECHOCARDIOGRAM, TRANSESOPHAGEAL;  Surgeon: Grayson Lin III, MD;   Location: [facility] EP LAB;  Service: Cardiology;  Laterality: N/A;    TREATMENT OF CARDIAC ARRHYTHMIA N/A 2019    Procedure: CARDIOVERSION;  Surgeon: Bronson Bowden MD;  Location: [facility] EP LAB;  Service: Cardiology;  Laterality: N/A;  AF, FELICIANO, DCCV, MAC, GP, 3 PREP       Family History   Problem Relation Name Age of Onset    Diabetes Mother      Hypertension Mother      Heart disease Mother          CAD with PCI    Heart disease Father      Cancer Brother 2         one sister with breast cancer    Hypertension Brother 2         one sister with HTN and borderline DM    Stroke Neg Hx      Kidney disease Neg Hx      Colon cancer Neg Hx      Esophageal cancer Neg Hx         Social History     Socioeconomic History    Marital status:    Occupational History     Employer: [employer]   Tobacco Use    Smoking status: Former     Current packs/day: 0.00     Average packs/day: 0.5 packs/day for 32.0 years (16.0 ttl pk-yrs)     Types: Cigarettes     Start date: 1984     Quit date: 2016     Years since quittin.7    Smokeless tobacco: Never    Tobacco comments:     Pt reports that he quit in , but started up again in . pt reports he is currently working on quitting again   Substance and Sexual Activity    Alcohol use: Yes     Comment: Pt reports occasional beers on Sundays. Pt reports drinking daily prior to ESRD diagnosis.    Drug use: No    Sexual activity: Yes     Partners: Female   Social History Narrative     for 14 years     for 38 years    2 children ages 41, 42     Social Determinants of Health     Financial Resource Strain: Low Risk  (5/3/2024)    Overall Financial Resource Strain (CARDIA)     Difficulty of Paying Living Expenses: Not hard at all   Food Insecurity: No Food Insecurity (5/3/2024)    Hunger Vital Sign     Worried About Running Out of Food in the Last Year: Never true     Ran Out of Food in the Last Year: Never true   Transportation Needs: No  Transportation Needs (5/3/2024)    PRAPARE - Transportation     Lack of Transportation (Medical): No     Lack of Transportation (Non-Medical): No   Physical Activity: Inactive (5/3/2024)    Exercise Vital Sign     Days of Exercise per Week: 0 days     Minutes of Exercise per Session: 0 min   Stress: No Stress Concern Present (5/3/2024)    [employer] - Occupational Stress Questionnaire     Feeling of Stress : Not at all   Housing Stability: Low Risk  (5/3/2024)    Housing Stability Vital Sign     Unable to Pay for Housing in the Last Year: No     Homeless in the Last Year: No       MEDICATIONS & ALLERGIES:     Current Outpatient Medications on File Prior to Visit   Medication Sig    aspirin 81 MG Chew Take 81 mg by mouth once daily.    atorvastatin (LIPITOR) 40 MG tablet Take 1 tablet (40 mg total) by mouth once daily.    blood sugar diagnostic Strp Use to check blood glucose 1 times daily, to use with insurance preferred meter    blood-glucose meter Misc Use to check blood glucose 1 times daily, to use with insurance preferred meter    blood-glucose sensor Kayla Gin 3, use as directed, change every 14 days , e 11.65    carvediloL (COREG) 25 MG tablet Take 1 tablet (25 mg total) by mouth 2 (two) times daily.    dronedarone (MULTAQ) 400 mg Tab Take 1 tablet (400 mg total) by mouth 2 (two) times daily with meals.    empagliflozin (JARDIANCE) 10 mg tablet Take 1 tablet (10 mg total) by mouth once daily.    furosemide (LASIX) 40 MG tablet Take 1 tablet (40 mg total) by mouth once daily.    gabapentin (NEURONTIN) 300 MG capsule Take 1 capsule by mouth in the morning, 1 capsule midday, and 3 capsules at night.    glucagon (GVOKE HYPOPEN 2-PACK) 1 mg/0.2 mL AtIn Inject 0.2 mLs into the skin as needed (severe hypoglycemia).    hydrALAZINE (APRESOLINE) 50 MG tablet Take 1 tablet (50 mg total) by mouth every 8 (eight) hours.    insulin aspart U-100 (NOVOLOG) 100 unit/mL (3 mL) InPn pen Inject 16  "units w/ breakfast, 10 units at lunch and dinner scale 180-230+2, 231-280+4, 281-330+6, 331-380+8, >380+10.  *Max daily 66 units.* (Patient taking differently: Inject 15 Units into the skin 3 (three) times daily with meals. Inject 16 units w/ breakfast, 10 units at lunch and dinner scale 180-230+2, 231-280+4, 281-330+6, 331-380+8, >380+10.  *Max daily 66 units.*)    insulin glargine U-100, Lantus, (LANTUS SOLOSTAR U-100 INSULIN) 100 unit/mL (3 mL) InPn pen Inject 20 Units into the skin every morning.    lancets 30 gauge Misc Use to check blood glucose 1 times daily, to use with insurance preferred meter    magnesium oxide (MAG-OX) 400 mg (241.3 mg magnesium) tablet Take 1 tablet (400 mg total) by mouth 2 (two) times daily.    meclizine (ANTIVERT) 25 mg tablet Take 1 tablet (25 mg total) by mouth 3 (three) times daily as needed for Dizziness.    mycophenolate (CELLCEPT) 250 mg Cap Take 2 capsules (500 mg total) by mouth 2 (two) times daily.    pen needle, diabetic (BD ULTRA-FINE LO PEN NEEDLE) 32 gauge x 5/32" Ndle USE TO ADMINISTER INSULIN 4 TIMES DAILY.    pen needle, diabetic (BD ULTRA-FINE LO PEN NEEDLE) 32 gauge x 5/32" Ndle use four times a day    predniSONE (DELTASONE) 5 MG tablet Take 1 tablet (5 mg total) by mouth once daily.    rivaroxaban (XARELTO) 15 mg Tab Take 1 tablet (15 mg total) by mouth daily with dinner or evening meal.    tacrolimus (PROGRAF) 0.5 MG Cap Take 1 capsule (0.5 mg total) by mouth every OTHER day.    valsartan (DIOVAN) 320 MG tablet Take 1 tablet (320 mg total) by mouth once daily.    pantoprazole (PROTONIX) 40 MG tablet Take 1 tablet (40 mg total) by mouth once daily. (Patient not taking: Reported on 7/11/2024)    [DISCONTINUED] insulin lispro (HUMALOG KWIKPEN INSULIN) 100 unit/mL pen Inject 18 units w/ breakfast, 16 units w/ lunch and dinner plus scale 150-200 +2, 201-250 +4, 251-300 +6, 301-350 +8.     Current Facility-Administered Medications on File Prior to Visit   Medication "    balanced salt irrigation intra-ocular solution 1 drop    epoetin tanya injection 4,000 Units    phenylephrine HCL 10% ophthalmic solution 1 drop    proparacaine 0.5 % ophthalmic solution 1 drop    sodium chloride 0.9% flush 10 mL    TETRAcaine HCl (PF) 0.5 % Drop 1 drop       Review of patient's allergies indicates:   Allergen Reactions    Nifedipine Other (See Comments)     Gingival hyperplasia       OBJECTIVE:     Vital Signs:  Vitals:    08/29/24 1401   BP: (!) 162/75   Pulse: (!) 55   SpO2: 99%   Weight: 89.8 kg (198 lb)         Body mass index is 26.12 kg/m².     Physical Exam  Constitutional:       General: He is not in acute distress.     Appearance: Normal appearance. He is not ill-appearing or toxic-appearing.   HENT:      Head: Atraumatic.      Right Ear: External ear normal.      Left Ear: External ear normal.      Nose: Nose normal.      Mouth/Throat:      Mouth: Mucous membranes are moist.   Eyes:      Extraocular Movements: Extraocular movements intact.   Cardiovascular:      Rate and Rhythm: Normal rate and regular rhythm.      Pulses: Normal pulses.      Heart sounds: Normal heart sounds. No murmur heard.  Pulmonary:      Effort: Pulmonary effort is normal. No respiratory distress.      Breath sounds: Normal breath sounds. No wheezing or rales.   Chest:      Chest wall: No tenderness.   Abdominal:      General: Abdomen is flat.      Palpations: Abdomen is soft.   Musculoskeletal:         General: No swelling. Normal range of motion.      Cervical back: Normal range of motion.      Right lower leg: No edema.      Left lower leg: No edema.   Skin:     General: Skin is warm.      Capillary Refill: Capillary refill takes less than 2 seconds.      Coloration: Skin is not jaundiced.      Findings: No lesion or rash.   Neurological:      General: No focal deficit present.      Mental Status: He is alert and oriented to person, place, and time. Mental status is at baseline.      Cranial Nerves: No cranial  nerve deficit.      Motor: No weakness.   Psychiatric:         Mood and Affect: Mood normal.         Behavior: Behavior normal.         Laboratory  No results found for this or any previous visit (from the past 24 hour(s)).    Diagnostic Results:      Health Maintenance Due   Topic Date Due    COVID-19 Vaccine (7 - 2023-24 season) 01/03/2024         ASSESSMENT & PLAN:   Mr. Ravi Zamorano is a 68 y.o. male post renal transplant in 2016 who presents to the office for follow up of CKD. He has had stable renal function over the past 18 months, with his creatinine at 1.7, however in June 2024 he had an acute rise in creatinine to 2.7 in the setting of volume overload. He reports worsening foam in his urine. He denies missing any immunosuppressive medications but did have an elevated trough and his tacrolimus was reduced to every other day dosing. A1C is elevated at 6.8 in May and he reports fluctuations in his blood pressures ranging from 110s systolic to 150s systolic.    I have asked him to limit his protein and animal protein intake, we will recheck renal function panel now. Continue on MARCIAL for his anemia. I have advised the patient to increase his daily exercise as well as try to lose some weight. I have also advised that he adopt a low salt (less than 2g/day) as well as low protein diet.     I have advised that he check his blood pressures three tiems daily and to alert me if his blood pressure is elevated above 130/80 mmHg. I offered him digital blood pressure monitoring, which he is not interested in at this time and would prefer to use his own machine. Please bring in your home blood pressure machine during your next visit for comparison to our readings.     I have asked the patient to follow up with his endocrinologist for diabetes management.    Stage 3b chronic kidney disease  -     Renal Function Panel; Standing  -     CBC Without Differential; Standing    Proteinuria, unspecified type    Renovascular  hypertension    S/P cadaveric kidney transplant 11/5/2016. ESRD secondary to HTN/DMII    Immunocompromised state due to drug therapy  -     Tacrolimus Level; Future; Expected date: 08/29/2024    Chronic kidney disease-mineral bone disorder (CKD-MBD) with stage 3b chronic kidney disease  -     ergocalciferol (ERGOCALCIFEROL) 50,000 unit Cap; Take 1 capsule (50,000 Units total) by mouth every 7 days.  Dispense: 4 capsule; Refill: 2            RTC in 3 months    Discussed with Dr. Amin  - staff attestation to follow      Francesco Lopez MD  Nephrology PGY-4    [address] [place] [address]  [phone]

## 2024-08-30 VITALS
BODY MASS INDEX: 26.12 KG/M2 | SYSTOLIC BLOOD PRESSURE: 119 MMHG | DIASTOLIC BLOOD PRESSURE: 58 MMHG | WEIGHT: 198 LBS | OXYGEN SATURATION: 100 % | HEART RATE: 50 BPM

## 2024-08-30 LAB — TACROLIMUS BLD-MCNC: 8.2 NG/ML (ref 5–15)

## 2024-08-30 NOTE — PROGRESS NOTES
Hgb  10.0/ Hct 35.8    1 st Dose Retacrit 4000 units .Per Form # [id number]. Pt was educated on mechanism of action of medication and possible side effects. Handout was given also.All concerns and questions was addressed. Pt was ask to wait 15 min after administration ,tolerated well, no reactions noted. Pt was given 1 week appt.     GFR 38.4

## 2024-09-17 ENCOUNTER — LAB VISIT (OUTPATIENT)
Dept: LAB | Facility: HOSPITAL | Age: 69
End: 2024-09-17
Payer: MEDICARE

## 2024-09-17 DIAGNOSIS — D63.8 ANEMIA OF CHRONIC DISEASE: ICD-10-CM

## 2024-09-17 LAB
HCT VFR BLD AUTO: 37.7 % (ref 40–54)
HGB BLD-MCNC: 10.8 G/DL (ref 14–18)
IRON SERPL-MCNC: 55 UG/DL (ref 45–160)
SATURATED IRON: 28 % (ref 20–50)
TOTAL IRON BINDING CAPACITY: 200 UG/DL (ref 250–450)
TRANSFERRIN SERPL-MCNC: 135 MG/DL (ref 200–375)

## 2024-09-17 PROCEDURE — 36415 COLL VENOUS BLD VENIPUNCTURE: CPT | Mod: HCNC | Performed by: STUDENT IN AN ORGANIZED HEALTH CARE EDUCATION/TRAINING PROGRAM

## 2024-09-17 PROCEDURE — 85018 HEMOGLOBIN: CPT | Mod: HCNC | Performed by: STUDENT IN AN ORGANIZED HEALTH CARE EDUCATION/TRAINING PROGRAM

## 2024-09-17 PROCEDURE — 85014 HEMATOCRIT: CPT | Mod: HCNC | Performed by: STUDENT IN AN ORGANIZED HEALTH CARE EDUCATION/TRAINING PROGRAM

## 2024-09-17 PROCEDURE — 83540 ASSAY OF IRON: CPT | Mod: HCNC | Performed by: STUDENT IN AN ORGANIZED HEALTH CARE EDUCATION/TRAINING PROGRAM

## 2024-09-17 NOTE — PROGRESS NOTES
Mr. Zamorano is a patient of Dr. Bowden and was last seen in clinic 5/6/2024.      Subjective:   Patient ID:  Ravi Zamorano is a 68 y.o. male who presents for follow up of Atrial Fibrillation  .     HPI:    Mr. Zamorano is a 68 y.o. male with DM, kidney tx (2016), HFpEF, AF/AFL (PVI/PWI/CTI 6/2024) here for follow up after ablation.     Background:    Ravi Zamorano has a history of DM on insulin, CKD2, history of kidney transplant in 2016 (Cr 1.5 in 2/2021), and diastolic dysfunction, who was diagnosed with rate controlled AF in 8/2019, manifesting as worsening DOSS. An echo done in 8/2019 showed an EF of 55% and severe LAE (MINA 54.3 mL/m2). He underwent DCCV in 8/2010, and was started on flecainide 100 mg bid post-procedure.    An echo in 6/2021 showed an EF of 55%. A 2 week Chartboost DX monitor done in 7/2019 showed no arrhythmias.    At his 4/2023 visit, he was maintaining sinus rhythm but reporting more LH and DOSS. QRS wide on ECG--flecainide reduced to 50 mg bid. By 5/2023 QRS had narrowed, although he had an episode of syncope in 5/2023 thought to be due to hypotension 2/2 chlorthalidone.      SPECT 5/15/2023 negative for ischemia or scar. Event monitor 1/2024--one episode of AF noted 12/24/2023, and episodes of SR and junctional rhythm with HR ranging from 54-74.     At 2/2024 pt was in sinus rhythm. Reported chronic DOSS. Episodes of LH when hypotensive. Creatinine of 1.7 in 11/2023.  ms.    Pt seen by cardiology in 4/2024--back in AF. Presented to ED in 5/2024 with rate controlled AF and worsening HF sx..     Admitted in 4/2024 for anemia (Hg 5.7). GI thought it was low likelihood this was 2/2 GIB. Awaiting outpatient hematology input. Back on eliquis. Hg stable over the past month or so.    ADHF admission on 5/2024.     5/6/2024: In summary, Ravi Zamorano is a 68 year old male with persistent AF, who underwent DCCV and flecainide initiation in 8/2019. Over the past several months he has had  recurrent symptomatic AF.  Plan for PVI. Hold eliquis AM of procedure. FELICIANO--cx if sinus. Post-procedure will initiate a brief course of antiarrhythmics. Will also check for stable Hg in the weeks leading up to procedure, and postpone if Hg drifts down or if work-up identifies a source/problem that needs to be addressed.    Update (09/19/2024):    6/11/2024: DCCV prior to ablation. PVI. LA posterior wall isolation. CTI-line.    Today he says he has noticed a little more SOB and abdominal bloating in the past few days but he is taking lasix. Said his BP was low this morning. Has not taken his meds at all yet today. No CP, palpitations, LH, syncope reported.    Not on xarelto due to financial constraints. Stopped multaq recently when his prescription ended.  He is taking carvedilol 25mg BID for HR control.  Kidney function is low, with a creatinine of 1.9 on 8/29/2024.    I have personally reviewed the patient's EKG today, which shows sinus rhythm at 62bpm. WI interval is 202. QRS is 118. QTc is 450.    Relevant Cardiac Test Results:    2D Echo (6/16/2024):    Left Ventricle: The left ventricle is normal in size. Ventricular mass is normal. Mild septal thickening. Regional wall motion abnormalities present. See diagram for wall motion findings. Septal motion is abnormal. There is normal systolic function with a visually estimated ejection fraction of 55 - 60%. Ejection fraction by visual approximation is 58%.    Right Ventricle: Normal right ventricular cavity size. Wall thickness is normal. Systolic function is normal.    Tricuspid Valve: There is mild regurgitation.    Pulmonary Artery: There is severe pulmonary hypertension. The estimated pulmonary artery systolic pressure is 72 mmHg.    IVC/SVC: Normal venous pressure at 3 mmHg.    Pericardium: There is a small posterior effusion at base and none to trivial otherwise.    Current Outpatient Medications   Medication Sig    aspirin 81 MG Chew Take 81 mg by mouth once  daily.    atorvastatin (LIPITOR) 40 MG tablet Take 1 tablet (40 mg total) by mouth once daily.    blood sugar diagnostic Strp Use to check blood glucose 1 times daily, to use with insurance preferred meter    blood-glucose meter Misc Use to check blood glucose 1 times daily, to use with insurance preferred meter    blood-glucose sensor Kayla Gin 3, use as directed, change every 14 days , e 11.65    carvediloL (COREG) 25 MG tablet Take 1 tablet (25 mg total) by mouth 2 (two) times daily.    dronedarone (MULTAQ) 400 mg Tab Take 1 tablet (400 mg total) by mouth 2 (two) times daily with meals.    empagliflozin (JARDIANCE) 10 mg tablet Take 1 tablet (10 mg total) by mouth once daily.    ergocalciferol (ERGOCALCIFEROL) 50,000 unit Cap Take 1 capsule (50,000 Units total) by mouth every 7 days.    furosemide (LASIX) 40 MG tablet Take 1 tablet (40 mg total) by mouth once daily.    gabapentin (NEURONTIN) 300 MG capsule Take 1 capsule by mouth in the morning, 1 capsule midday, and 3 capsules at night.    glucagon (GVOKE HYPOPEN 2-PACK) 1 mg/0.2 mL AtIn Inject 0.2 mLs into the skin as needed (severe hypoglycemia).    hydrALAZINE (APRESOLINE) 50 MG tablet Take 1 tablet (50 mg total) by mouth every 8 (eight) hours.    insulin aspart U-100 (NOVOLOG) 100 unit/mL (3 mL) InPn pen Inject 16 units w/ breakfast, 10 units at lunch and dinner scale 180-230+2, 231-280+4, 281-330+6, 331-380+8, >380+10.  *Max daily 66 units.* (Patient taking differently: Inject 15 Units into the skin 3 (three) times daily with meals. Inject 16 units w/ breakfast, 10 units at lunch and dinner scale 180-230+2, 231-280+4, 281-330+6, 331-380+8, >380+10.  *Max daily 66 units.*)    insulin glargine U-100, Lantus, (LANTUS SOLOSTAR U-100 INSULIN) 100 unit/mL (3 mL) InPn pen Inject 20 Units into the skin every morning.    lancets 30 gauge Misc Use to check blood glucose 1 times daily, to use with insurance preferred meter    magnesium oxide (MAG-OX) 400 mg (241.3 mg  "magnesium) tablet Take 1 tablet (400 mg total) by mouth 2 (two) times daily.    meclizine (ANTIVERT) 25 mg tablet Take 1 tablet (25 mg total) by mouth 3 (three) times daily as needed for Dizziness.    mycophenolate (CELLCEPT) 250 mg Cap Take 2 capsules (500 mg total) by mouth 2 (two) times daily.    pantoprazole (PROTONIX) 40 MG tablet Take 1 tablet (40 mg total) by mouth once daily. (Patient not taking: Reported on 7/11/2024)    pen needle, diabetic (BD ULTRA-FINE LO PEN NEEDLE) 32 gauge x 5/32" Ndle USE TO ADMINISTER INSULIN 4 TIMES DAILY.    pen needle, diabetic (BD ULTRA-FINE LO PEN NEEDLE) 32 gauge x 5/32" Ndle use four times a day    predniSONE (DELTASONE) 5 MG tablet Take 1 tablet (5 mg total) by mouth once daily.    rivaroxaban (XARELTO) 15 mg Tab Take 1 tablet (15 mg total) by mouth daily with dinner or evening meal.    tacrolimus (PROGRAF) 0.5 MG Cap Take 1 capsule (0.5 mg total) by mouth every OTHER day.    valsartan (DIOVAN) 320 MG tablet Take 1 tablet (320 mg total) by mouth once daily.     Current Facility-Administered Medications   Medication    epoetin tanya injection 4,000 Units     Facility-Administered Medications Ordered in Other Visits   Medication    balanced salt irrigation intra-ocular solution 1 drop    phenylephrine HCL 10% ophthalmic solution 1 drop    proparacaine 0.5 % ophthalmic solution 1 drop    sodium chloride 0.9% flush 10 mL    TETRAcaine HCl (PF) 0.5 % Drop 1 drop       Review of Systems   Constitutional: Negative for malaise/fatigue.   Cardiovascular:  Positive for dyspnea on exertion. Negative for chest pain, irregular heartbeat, leg swelling and palpitations.   Respiratory:  Positive for shortness of breath.    Hematologic/Lymphatic: Negative for bleeding problem.   Skin:  Negative for rash.   Musculoskeletal:  Negative for myalgias.   Gastrointestinal:  Positive for bloating. Negative for hematemesis, hematochezia and nausea.   Genitourinary:  Negative for hematuria. " "  Neurological:  Negative for light-headedness.   Psychiatric/Behavioral:  Negative for altered mental status.    Allergic/Immunologic: Negative for persistent infections.       Objective:          BP (!) 188/70   Pulse 62   Ht 6' 1" (1.854 m)   Wt 92.9 kg (204 lb 12.9 oz)   BMI 27.02 kg/m²     Physical Exam  Vitals and nursing note reviewed.   Constitutional:       Appearance: Normal appearance. He is well-developed.   HENT:      Head: Normocephalic.      Nose: Nose normal.   Eyes:      Pupils: Pupils are equal, round, and reactive to light.   Cardiovascular:      Rate and Rhythm: Normal rate and regular rhythm.   Pulmonary:      Effort: No respiratory distress.   Musculoskeletal:         General: Normal range of motion.   Skin:     General: Skin is warm and dry.      Findings: No erythema.   Neurological:      Mental Status: He is alert and oriented to person, place, and time.   Psychiatric:         Speech: Speech normal.         Behavior: Behavior normal.           Lab Results   Component Value Date     (L) 2024    K 4.4 2024    MG 1.9 2024    BUN 22 2024    CREATININE 1.9 (H) 2024    ALT <5 (L) 2024    AST 14 2024    HGB 10.8 (L) 2024    HCT 37.7 (L) 2024    HCT 30 (L) 06/15/2024    TSH 0.924 2024    LDLCALC 48.6 (L) 2024       Recent Labs   Lab 24  1800 24  0901   INR 1.1 1.2       Assessment:     1. Persistent atrial fibrillation    2. Essential (primary) hypertension    3. Anticoagulant long-term use    4. Status post -donor kidney transplantation      Plan:     In summary, Mr. Zamorano is a 68 y.o. male with DM, kidney tx (), HFpEF, AF/AFL (PVI/PWI/CTI 2024), HTN here for follow up after ablation.   Pt is 3 mo s/p PVI/PWI/CTI-line. He is doing well from a rhythm standpoint, with no documented or symptomatic recurrence of arrhythmia since procedure.  He recently stopped his multaq. Not on xarelto due to " expense. Discussed that his CHADSVASc is 4 and it is important to restart OAC for CVA prophylaxis. He does not want to start warfarin.  Xarelto prescription is at specialty pharmacy. He and his wife agreed to go downstairs and discuss available options to get him through the donut hole. Alternative he would need to go on warfarin.  His BP is very high (188/70). He says it was low this morning. Has not taken any of his BP meds today. Advised him to take his meds as soon as he gets home and monitor for appropriate BP decrease. ED if SBP>185 despite meds. He follows with nephrology.    Restart xarelto  Take BP meds as soon as you are home  RTC 6 mo, sooner if needed    *A copy of this note has been sent to Dr. Bowden*    Follow up in about 3 months (around 12/19/2024).    ------------------------------------------------------------------    REYES Flynn, NP-C  Cardiac Electrophysiology

## 2024-09-19 ENCOUNTER — OFFICE VISIT (OUTPATIENT)
Dept: ELECTROPHYSIOLOGY | Facility: CLINIC | Age: 69
End: 2024-09-19
Payer: MEDICARE

## 2024-09-19 ENCOUNTER — LAB VISIT (OUTPATIENT)
Dept: LAB | Facility: HOSPITAL | Age: 69
End: 2024-09-19
Attending: INTERNAL MEDICINE
Payer: MEDICARE

## 2024-09-19 VITALS
HEIGHT: 73 IN | BODY MASS INDEX: 27.14 KG/M2 | DIASTOLIC BLOOD PRESSURE: 70 MMHG | HEART RATE: 62 BPM | WEIGHT: 204.81 LBS | SYSTOLIC BLOOD PRESSURE: 188 MMHG

## 2024-09-19 DIAGNOSIS — Z94.0 KIDNEY REPLACED BY TRANSPLANT: ICD-10-CM

## 2024-09-19 DIAGNOSIS — I48.19 PERSISTENT ATRIAL FIBRILLATION: Primary | ICD-10-CM

## 2024-09-19 DIAGNOSIS — Z79.01 ANTICOAGULANT LONG-TERM USE: ICD-10-CM

## 2024-09-19 DIAGNOSIS — I10 ESSENTIAL (PRIMARY) HYPERTENSION: ICD-10-CM

## 2024-09-19 DIAGNOSIS — Z94.0 STATUS POST DECEASED-DONOR KIDNEY TRANSPLANTATION: ICD-10-CM

## 2024-09-19 LAB
OHS QRS DURATION: 118 MS
OHS QTC CALCULATION: 450 MS
TACROLIMUS BLD-MCNC: 4.8 NG/ML (ref 5–15)

## 2024-09-19 PROCEDURE — 80197 ASSAY OF TACROLIMUS: CPT | Mod: HCNC | Performed by: INTERNAL MEDICINE

## 2024-09-19 PROCEDURE — 36415 COLL VENOUS BLD VENIPUNCTURE: CPT | Mod: HCNC | Performed by: INTERNAL MEDICINE

## 2024-09-19 PROCEDURE — 99999 PR PBB SHADOW E&M-EST. PATIENT-LVL V: CPT | Mod: PBBFAC,HCNC,, | Performed by: NURSE PRACTITIONER

## 2024-09-19 PROCEDURE — 93010 ELECTROCARDIOGRAM REPORT: CPT | Mod: HCNC,S$GLB,, | Performed by: INTERNAL MEDICINE

## 2024-09-19 RX ORDER — TACROLIMUS 0.5 MG/1
0.5 CAPSULE ORAL DAILY
Qty: 30 CAPSULE | Refills: 11 | Status: SHIPPED | OUTPATIENT
Start: 2024-09-19

## 2024-09-19 NOTE — TELEPHONE ENCOUNTER
Received written order from Dr. Owens.  Instructed pt to increase Prograf to 0.5 mg once a day and repeat a level next Thursday.  Pt verbalized understanding.       ----- Message from Osmin Owens Jr., MD sent at 9/19/2024 11:15 AM CDT -----  Since this is a 24h trough, he should increase dose from 0.5 mg q48h to 0.5 mg daily.

## 2024-09-26 ENCOUNTER — LAB VISIT (OUTPATIENT)
Dept: LAB | Facility: HOSPITAL | Age: 69
End: 2024-09-26
Attending: INTERNAL MEDICINE
Payer: MEDICARE

## 2024-09-26 DIAGNOSIS — Z94.0 KIDNEY REPLACED BY TRANSPLANT: ICD-10-CM

## 2024-09-26 LAB — TACROLIMUS BLD-MCNC: 4.7 NG/ML (ref 5–15)

## 2024-09-26 PROCEDURE — 80197 ASSAY OF TACROLIMUS: CPT | Mod: HCNC | Performed by: INTERNAL MEDICINE

## 2024-10-08 ENCOUNTER — OFFICE VISIT (OUTPATIENT)
Dept: CARDIOLOGY | Facility: CLINIC | Age: 69
End: 2024-10-08
Payer: MEDICARE

## 2024-10-08 VITALS
OXYGEN SATURATION: 100 % | DIASTOLIC BLOOD PRESSURE: 80 MMHG | HEIGHT: 73 IN | BODY MASS INDEX: 27.17 KG/M2 | HEART RATE: 68 BPM | SYSTOLIC BLOOD PRESSURE: 170 MMHG | WEIGHT: 205 LBS

## 2024-10-08 DIAGNOSIS — E11.22 TYPE 2 DIABETES MELLITUS WITH STAGE 3A CHRONIC KIDNEY DISEASE, WITH LONG-TERM CURRENT USE OF INSULIN: ICD-10-CM

## 2024-10-08 DIAGNOSIS — Z94.0 S/P KIDNEY TRANSPLANT: ICD-10-CM

## 2024-10-08 DIAGNOSIS — N18.31 TYPE 2 DIABETES MELLITUS WITH STAGE 3A CHRONIC KIDNEY DISEASE, WITH LONG-TERM CURRENT USE OF INSULIN: ICD-10-CM

## 2024-10-08 DIAGNOSIS — I48.19 PERSISTENT ATRIAL FIBRILLATION: ICD-10-CM

## 2024-10-08 DIAGNOSIS — I10 ESSENTIAL (PRIMARY) HYPERTENSION: ICD-10-CM

## 2024-10-08 DIAGNOSIS — I48.0 PAROXYSMAL A-FIB: ICD-10-CM

## 2024-10-08 DIAGNOSIS — I50.32 CHRONIC DIASTOLIC CONGESTIVE HEART FAILURE: ICD-10-CM

## 2024-10-08 DIAGNOSIS — I50.32 CHRONIC DIASTOLIC HEART FAILURE: Primary | ICD-10-CM

## 2024-10-08 DIAGNOSIS — E78.2 MIXED HYPERLIPIDEMIA: ICD-10-CM

## 2024-10-08 DIAGNOSIS — I73.9 PAD (PERIPHERAL ARTERY DISEASE): ICD-10-CM

## 2024-10-08 DIAGNOSIS — N18.31 STAGE 3A CHRONIC KIDNEY DISEASE: ICD-10-CM

## 2024-10-08 DIAGNOSIS — Z79.4 TYPE 2 DIABETES MELLITUS WITH STAGE 3A CHRONIC KIDNEY DISEASE, WITH LONG-TERM CURRENT USE OF INSULIN: ICD-10-CM

## 2024-10-08 DIAGNOSIS — R00.2 PALPITATIONS: ICD-10-CM

## 2024-10-08 PROCEDURE — 99999 PR PBB SHADOW E&M-EST. PATIENT-LVL V: CPT | Mod: PBBFAC,HCNC,, | Performed by: PHYSICIAN ASSISTANT

## 2024-10-08 PROCEDURE — 93010 ELECTROCARDIOGRAM REPORT: CPT | Mod: HCNC,S$GLB,, | Performed by: INTERNAL MEDICINE

## 2024-10-08 RX ORDER — HYDRALAZINE HYDROCHLORIDE 100 MG/1
100 TABLET, FILM COATED ORAL EVERY 8 HOURS
Qty: 270 TABLET | Refills: 3 | Status: SHIPPED | OUTPATIENT
Start: 2024-10-08 | End: 2025-10-08

## 2024-10-08 RX ORDER — FUROSEMIDE 40 MG/1
40 TABLET ORAL 2 TIMES DAILY
Qty: 180 TABLET | Refills: 3 | Status: SHIPPED | OUTPATIENT
Start: 2024-10-08 | End: 2025-10-08

## 2024-10-08 NOTE — PROGRESS NOTES
General Cardiology Clinic Note  Reason for Visit: 3 month follow up   Last Clinic Visit: 7/10/2024    HPI:   Ravi Zamorano is a 68 y.o. male who presents for 3 month follow up     Problems:  Atrial fibrillation s/p CTI/PVI/PWI 6/11/2024  HFpEF  Hypertension  Hyperlipidemia   PAD  IDDM  s/p kidney transplant 2016   CKD Stage 3  Anemia    Interval HPI  Patient presents for 3 month follow up. He is feeling okay overall. He may have some mild DOSS, but not limiting. He still has edema and is taking a second dose of Lasix sometimes. He is on a low sodium diet. He denies orthopnea, PND. He denies syncope, chest pain, HA, vision changes, palpitations, syncope, lightheadedness. He is not exercising. His BP cuff at home does not seem accurate.     7/10/2024 HPI  Patient presents for 3 week follow up. He is feeling better since last visit. Swelling has improved significantly. He will still get some mild edema in ankles on and off, but no longer staying swollen. He denies DOSS, orthopnea, PND, CP, palpitations, lightheadedness, syncope. He does have some claudication in L calf with ambulation, but can walk at least 2 blocks.     6/19/2024 HPI  Patient underwent RFA 6/11/2024. He was started on Multaq. He was then admitted 6/15-6/16 for ADHF. He was diuresed with IV Lasix. He was discharged on higher dose of Lasix and restarted on Spironolactone.     Patient presents for follow up. States he is not feeling any better. He still has leg swelling. He has been very sedentary since ablation, so unclear if he is still having dyspnea. He denies orthopnea and PND. His weight at home is up a couple pounds since hospital discharge. He reports being on a low sodium diet.     5/21/2024  Patient was admitted 5/2-5/3 for ADHF. Presented to the ED with DOSS, orthopnea, edema. In ED, /96. CXR with left sided pleural effusion. BNP >4,900. Troponin 0.049. Hgb 7.9 (at baseline for April). Required brief IV diuresis with improvement.  BP was uncontrolled during the hospital possibly contributing to volume overload.  Changes were made to his medications including up titration of valsartan and Coreg, HCTZ was discontinued.  TTE was obtained.  Was in atrial fibrillation during study, which showed EF 50-55%. Unable to assess diastolic function due to atrial fibrillation.      Patient presents for follow up. He still has mild edema. He reports DOSS with mild exertion, such as walking short distances or taking out the trash. He has to walk very slowly. Symptoms are significantly improved from when he presented to the ED, but not normal for him. He denies chest pain, orthopnea, PND, lightheadedness, syncope. He reports palpitations. He is not doing daily weights or checking BP consistently at home. He has two BP cuffs but they give completely different readings. He is on a low sodium diet. He was not discharged on Lasix after the hospitalization, so he was just prescribed it about a week ago. He is also supposed to be taking Coreg 50 mg bid, but is on 25 mg bid.     ROS:      Review of Systems   Constitutional: Negative for diaphoresis, malaise/fatigue, weight gain and weight loss.   HENT:  Negative for nosebleeds.    Eyes:  Negative for vision loss in left eye, vision loss in right eye and visual disturbance.   Cardiovascular:  Positive for dyspnea on exertion and leg swelling. Negative for chest pain, claudication, irregular heartbeat, near-syncope, orthopnea, palpitations, paroxysmal nocturnal dyspnea and syncope.   Respiratory:  Negative for cough, shortness of breath, sleep disturbances due to breathing, snoring and wheezing.    Hematologic/Lymphatic: Negative for bleeding problem. Does not bruise/bleed easily.   Skin:  Negative for poor wound healing and rash.   Musculoskeletal:  Negative for muscle cramps and myalgias.   Gastrointestinal:  Negative for bloating, abdominal pain, diarrhea, heartburn, melena, nausea and vomiting.   Genitourinary:   Negative for hematuria and nocturia.   Neurological:  Negative for brief paralysis, dizziness, headaches, light-headedness, numbness and weakness.   Psychiatric/Behavioral:  Negative for depression.    Allergic/Immunologic: Negative for hives.       PMH:     Past Medical History:   Diagnosis Date    Anticoagulant long-term use     Anxiety 07/29/2014    Arthritis     atrial fibrillation     Bilateral diabetic retinopathy 2017    CKD (chronic kidney disease) stage 2, GFR 60-89 ml/min 12/28/2016    CKD (chronic kidney disease) stage 4, GFR 15-29 ml/min 07/29/2014    Colon polyps 2014    Depression - situational 07/29/2014    Diabetes mellitus     Diabetes type 2 since 2000 07/29/2014    Diabetic neuropathy 07/29/2014    Encounter for blood transfusion     History of cardioversion 10/03/2019    History of hepatitis C, s/p successful Rx w/ SVR24 - 9/2017 07/29/2014    Genotype 1a, treatment naive 10/2014 liver biopsy - grade 1 / stage 1 Completed Harvoni w/ SVR    Hyperlipidemia 07/29/2014    Hypertension     Neuropathy     Organ transplant candidate 07/29/2014    Pancreatitis     S/P cadaveric kidney transplant 11/5/2016. ESRD secondary to HTN/DMII 11/05/2016    Stage 3a chronic kidney disease 12/28/2016    Type 2 diabetes mellitus with stage 3a chronic kidney disease, with long-term current use of insulin 07/29/2014     Past Surgical History:   Procedure Laterality Date    ABLATION OF ARRHYTHMOGENIC FOCUS FOR ATRIAL FIBRILLATION N/A 6/11/2024    Procedure: Ablation atrial fibrillation;  Surgeon: Bronson Bowden MD;  Location: [facility] EP LAB;  Service: Cardiology;  Laterality: N/A;  AF, FELICIANO (cx if SR), PVI, RFA, Carto, Gen, GP, 3 Prep    ABLATION, ATRIAL FLUTTER, TYPICAL  6/11/2024    Procedure: Ablation, Atrial Flutter, Typical;  Surgeon: Bronson Bowden MD;  Location: [facility] EP LAB;  Service: Cardiology;;    APPENDECTOMY      BONE MARROW BIOPSY Left 6/26/2024    Procedure: Biopsy-bone marrow;  Surgeon: Bridger Zapata  MD KIM;  Location: [facility] ENDO (4TH FLR);  Service: Oncology;  Laterality: Left;  6/24-pt knows to hold aspirin and eliquis as instructed by hem/onc, precall complete-Kpvt    CARDIOVERSION  6/11/2024    Procedure: Cardioversion;  Surgeon: Bronson Bowden MD;  Location: [facility] EP LAB;  Service: Cardiology;;    CATARACT EXTRACTION W/  INTRAOCULAR LENS IMPLANT Right 3/13/2024    Procedure: EXTRACTION, CATARACT, WITH IOL INSERTION;  Surgeon: Jennifer Palacio MD;  Location: [facility] OR;  Service: Ophthalmology;  Laterality: Right;    CATARACT EXTRACTION W/  INTRAOCULAR LENS IMPLANT Left 4/10/2024    Procedure: EXTRACTION, CATARACT, WITH IOL INSERTION;  Surgeon: Jennifer Palacio MD;  Location: [facility] OR;  Service: Ophthalmology;  Laterality: Left;    COLONOSCOPY N/A 12/21/2020    Procedure: COLONOSCOPY;  Surgeon: Tico Bell MD;  Location: [facility] ENDO (4TH FLR);  Service: Endoscopy;  Laterality: N/A;  ok to hold eliquis per Dr. Valadez, see telephone encounter 11/13/2020-MS  covid test 12/18 Guys    KIDNEY TRANSPLANT      TRANSESOPHAGEAL ECHOCARDIOGRAPHY N/A 8/26/2019    Procedure: ECHOCARDIOGRAM, TRANSESOPHAGEAL;  Surgeon: Dolores Diagnostic Provider;  Location: [facility] EP LAB;  Service: Cardiology;  Laterality: N/A;    TRANSESOPHAGEAL ECHOCARDIOGRAPHY N/A 6/11/2024    Procedure: ECHOCARDIOGRAM, TRANSESOPHAGEAL;  Surgeon: Grayson Lin III, MD;  Location: [facility] EP LAB;  Service: Cardiology;  Laterality: N/A;    TREATMENT OF CARDIAC ARRHYTHMIA N/A 8/26/2019    Procedure: CARDIOVERSION;  Surgeon: Bronson Bowden MD;  Location: [facility] EP LAB;  Service: Cardiology;  Laterality: N/A;  AF, FELICAINO, DCCV, MAC, GP, 3 PREP     Allergies:     Review of patient's allergies indicates:   Allergen Reactions    Nifedipine Other (See Comments)     Gingival hyperplasia     Medications:     Current Outpatient Medications on File Prior to Visit   Medication Sig Dispense Refill    aspirin 81 MG Chew Take 81 mg by mouth once daily.      atorvastatin  (LIPITOR) 40 MG tablet Take 1 tablet (40 mg total) by mouth once daily. 90 tablet 3    blood sugar diagnostic Strp Use to check blood glucose 1 times daily, to use with insurance preferred meter 100 each 11    blood-glucose meter Misc Use to check blood glucose 1 times daily, to use with insurance preferred meter 1 each 0    blood-glucose sensor Kayla Gin 3, use as directed, change every 14 days , e 11.65 2 each 11    carvediloL (COREG) 25 MG tablet Take 1 tablet (25 mg total) by mouth 2 (two) times daily. 180 tablet 3    empagliflozin (JARDIANCE) 10 mg tablet Take 1 tablet (10 mg total) by mouth once daily. 30 tablet 11    ergocalciferol (ERGOCALCIFEROL) 50,000 unit Cap Take 1 capsule (50,000 Units total) by mouth every 7 days. 4 capsule 2    furosemide (LASIX) 40 MG tablet Take 1 tablet (40 mg total) by mouth once daily. 90 tablet 3    gabapentin (NEURONTIN) 300 MG capsule Take 1 capsule by mouth in the morning, 1 capsule midday, and 3 capsules at night. 450 capsule 3    glucagon (GVOKE HYPOPEN 2-PACK) 1 mg/0.2 mL AtIn Inject 0.2 mLs into the skin as needed (severe hypoglycemia). 0.4 mL 2    hydrALAZINE (APRESOLINE) 50 MG tablet Take 1 tablet (50 mg total) by mouth every 8 (eight) hours. 90 tablet 11    influenza, adjuvanted, (FLUAD TRIV 2024-25,65Y UP,,PF,) 45 mcg (15 mcg x 3)/0.5 mL IM vaccine (> or = 66 yo) Inject into the muscle. 0.5 mL 0    insulin aspart U-100 (NOVOLOG) 100 unit/mL (3 mL) InPn pen Inject 16 units w/ breakfast, 10 units at lunch and dinner scale 180-230+2, 231-280+4, 281-330+6, 331-380+8, >380+10.  *Max daily 66 units.* (Patient taking differently: Inject 15 Units into the skin 3 (three) times daily with meals. Inject 16 units w/ breakfast, 10 units at lunch and dinner scale 180-230+2, 231-280+4, 281-330+6, 331-380+8, >380+10.  *Max daily 66 units.*) 30 mL 6    insulin glargine U-100, Lantus, (LANTUS SOLOSTAR U-100 INSULIN) 100 unit/mL (3 mL) InPn pen Inject 20 Units into the skin every  "morning. 15 mL 6    lancets 30 gauge Misc Use to check blood glucose 1 times daily, to use with insurance preferred meter 100 each 3    magnesium oxide (MAG-OX) 400 mg (241.3 mg magnesium) tablet Take 1 tablet (400 mg total) by mouth 2 (two) times daily. 60 tablet 6    meclizine (ANTIVERT) 25 mg tablet Take 1 tablet (25 mg total) by mouth 3 (three) times daily as needed for Dizziness. 21 tablet 3    mycophenolate (CELLCEPT) 250 mg Cap Take 2 capsules (500 mg total) by mouth 2 (two) times daily. 120 capsule 11    pantoprazole (PROTONIX) 40 MG tablet Take 1 tablet (40 mg total) by mouth once daily. (Patient not taking: Reported on 7/11/2024) 30 tablet 0    pen needle, diabetic (BD ULTRA-FINE LO PEN NEEDLE) 32 gauge x 5/32" Ndle USE TO ADMINISTER INSULIN 4 TIMES DAILY. 400 each 3    predniSONE (DELTASONE) 5 MG tablet Take 1 tablet (5 mg total) by mouth once daily. 30 tablet 11    rivaroxaban (XARELTO) 15 mg Tab Take 1 tablet (15 mg total) by mouth daily with dinner or evening meal. 30 tablet 11    tacrolimus (PROGRAF) 0.5 MG Cap Take 1 capsule (0.5 mg total) by mouth once daily. 30 capsule 11    valsartan (DIOVAN) 320 MG tablet Take 1 tablet (320 mg total) by mouth once daily. 90 tablet 3    [DISCONTINUED] insulin lispro (HUMALOG KWIKPEN INSULIN) 100 unit/mL pen Inject 18 units w/ breakfast, 16 units w/ lunch and dinner plus scale 150-200 +2, 201-250 +4, 251-300 +6, 301-350 +8. 30 mL 4     Current Facility-Administered Medications on File Prior to Visit   Medication Dose Route Frequency Provider Last Rate Last Admin    balanced salt irrigation intra-ocular solution 1 drop  1 drop Right Eye On Call Procedure Jennifer Palacio MD        epoetin tanya injection 4,000 Units  4,000 Units Subcutaneous Q7 Days         phenylephrine HCL 10% ophthalmic solution 1 drop  1 drop Right Eye PRN Jennifer Palacio MD        proparacaine 0.5 % ophthalmic solution 1 drop  1 drop Right Eye Daily PRN Jennifer Palacio MD        sodium " "chloride 0.9% flush 10 mL  10 mL Intravenous PRN Jennifer Palacio MD        TETRAcaine HCl (PF) 0.5 % Drop 1 drop  1 drop Right Eye PRN Jennifer Palacio MD         Social History:     Social History     Tobacco Use    Smoking status: Former     Current packs/day: 0.00     Average packs/day: 0.5 packs/day for 32.0 years (16.0 ttl pk-yrs)     Types: Cigarettes     Start date: 1984     Quit date: 2016     Years since quittin.8    Smokeless tobacco: Never    Tobacco comments:     Pt reports that he quit in , but started up again in . pt reports he is currently working on quitting again   Substance Use Topics    Alcohol use: Yes     Comment: Pt reports occasional beers on Sundays. Pt reports drinking daily prior to ESRD diagnosis.     Family History:     Family History   Problem Relation Name Age of Onset    Diabetes Mother      Hypertension Mother      Heart disease Mother          CAD with PCI    Heart disease Father      Cancer Brother 2         one sister with breast cancer    Hypertension Brother 2         one sister with HTN and borderline DM    Stroke Neg Hx      Kidney disease Neg Hx      Colon cancer Neg Hx      Esophageal cancer Neg Hx       Physical Exam:   BP (!) 170/80   Pulse 68   Ht 6' 1" (1.854 m)   Wt 93 kg (205 lb 0.4 oz)   SpO2 100%   BMI 27.05 kg/m²        Physical Exam  Vitals and nursing note reviewed.   Constitutional:       General: He is not in acute distress.     Appearance: Normal appearance.   HENT:      Head: Normocephalic and atraumatic.      Nose: Nose normal.   Eyes:      General: No scleral icterus.     Extraocular Movements: Extraocular movements intact.      Conjunctiva/sclera: Conjunctivae normal.   Neck:      Thyroid: No thyromegaly.      Vascular: Hepatojugular reflux present. No carotid bruit or JVD.   Cardiovascular:      Rate and Rhythm: Normal rate and regular rhythm. Frequent Extrasystoles are present.     Pulses: Normal pulses.      Heart " sounds: Normal heart sounds. No murmur heard.     No friction rub. No gallop.   Pulmonary:      Effort: Pulmonary effort is normal.      Breath sounds: Normal breath sounds. No wheezing, rhonchi or rales.   Chest:      Chest wall: No tenderness.   Abdominal:      General: Bowel sounds are normal. There is no distension.      Palpations: Abdomen is soft.      Tenderness: There is no abdominal tenderness.   Musculoskeletal:      Cervical back: Neck supple.      Right lower le+ Pitting Edema (to ankles bilaterally) present.      Left lower le+ Pitting Edema present.   Skin:     General: Skin is warm and dry.      Coloration: Skin is not pale.      Findings: No erythema or rash.      Nails: There is no clubbing.   Neurological:      Mental Status: He is alert and oriented to person, place, and time.   Psychiatric:         Mood and Affect: Mood and affect normal.         Behavior: Behavior normal.          Labs:     Lab Results   Component Value Date     (L) 2024    K 4.4 2024     2024    CO2 24 2024    BUN 22 2024    CREATININE 1.9 (H) 2024    ANIONGAP 8 2024     Lab Results   Component Value Date    HGBA1C 6.7 (H) 2024     Lab Results   Component Value Date     (H) 2024    BNP 3,203 (H) 06/15/2024    BNP 3,313 (H) 2024    Lab Results   Component Value Date    WBC 6.04 2024    HGB 11.6 (L) 2024    HCT 40.9 2024    HCT 30 (L) 06/15/2024     (L) 2024    GRAN 3.9 2024    GRAN 70.2 2024     Lab Results   Component Value Date    CHOL 112 (L) 2024    HDL 26 (L) 2024    LDLCALC 48.6 (L) 2024    TRIG 187 (H) 2024          Imaging:   Echocardiograms:   TTE 2024    Left Ventricle: The left ventricle is normal in size. Ventricular mass is normal. Mild septal thickening. Regional wall motion abnormalities present. See diagram for wall motion findings. Septal motion is  abnormal. There is normal systolic function with a visually estimated ejection fraction of 55 - 60%. Ejection fraction by visual approximation is 58%.    Right Ventricle: Normal right ventricular cavity size. Wall thickness is normal. Systolic function is normal.    Tricuspid Valve: There is mild regurgitation.    Pulmonary Artery: There is severe pulmonary hypertension. The estimated pulmonary artery systolic pressure is 72 mmHg.    IVC/SVC: Normal venous pressure at 3 mmHg.    Pericardium: There is a small posterior effusion at base and none to trivial otherwise.    TTE 5/3/2024    Irregularly irregular rhythm was present during the study    Left Ventricle: The left ventricle is normal in size. Ventricular mass is normal. Normal wall thickness. Normal wall motion. There is low normal systolic function with a visually estimated ejection fraction of 50 - 55%. Unable to assess diastolic function due to atrial fibrillation. Singnificant beat to beat variabilty due  to AF.    Left Ventricle: Unable to assess diastolic function due to atrial fibrillation. Normal left ventricular filling pressure.    Right Ventricle: Normal right ventricular cavity size. Wall thickness is normal. Systolic function is normal.    Left Atrium: Left atrium is severely dilated.    Right Atrium: Right atrium is mildly dilated.    Aortic Valve: The aortic valve is a bicuspid valve. There is mild aortic valve sclerosis. There is mild annular calcification present. There is mild aortic regurgitation.    Mitral Valve: There is mild regurgitation.    Aorta: Aortic root is mildly dilated measuring 3.69 cm. Ascending aorta is normal measuring 3.44 cm.    Pulmonary Artery: The estimated pulmonary artery systolic pressure is 42 mmHg.    IVC/SVC: Intermediate venous pressure at 8 mmHg.    Pericardium: There is a small posterior effusion. No indication of cardiac tamponade. Left pleural effusion.    TTE 7/11/2022  The left ventricle is normal in size  with eccentric hypertrophy and normal systolic function. The estimated ejection fraction is 65%.  Normal right ventricular size with normal right ventricular systolic function.  Left ventricular diastolic dysfunction.  Biatrial enlargement.  Mild aortic regurgitation.  PA systolic pressure is at elast 39 mmHg. The IVC is not visualized.    Stress Tests:   Nuclear stress test 5/15/2023    Normal myocardial perfusion scan. There is no evidence of myocardial ischemia or infarction.    The visually estimated ejection fraction is normal at rest and normal during stress.    There is normal wall motion at rest and post stress.    LV cavity size is normal at rest and normal at stress.    The ECG portion of the study is abnormal but not diagnostic due to resting ST-T abnormalities.    The patient reported no chest pain during the stress test.    There were no arrhythmias during stress.    There are no prior studies for comparison.    Caths:   None    Other:  Lower extremity arterial ultrasound 2/1/2024    Right resting WENDI 0.65, is suggestive of moderate right lower extremity arterial disease.    There is a short-segment occlusion at the mid SFA, followed by high-grade stenosis in the distal segment.    The right anterior tibial artery is occluded in the mid segment.    Left resting WENDI 0.70, is suggestive of moderate left lower extremity arterial disease.    The left SFA is occluded in the proximal/mid segment, with distal reconstitution.    The left anterior tibial artery is occluded in the mid segment.    48 Hour Holter Monitor 4/17/2023  Baseline rhythm was sinus bradycardia with first degree AV block and an average heart rate of 55 bpm.  There were no reported symptoms.  There were very rare PVCs with an overall PVC burden of 0.1%. There were frequent PACs with an overall PAC burden of 1.4%.  There were asymptomatic pauses of up to 2.1 seconds in duration during sleep with no evidence of high-degree AV block.  There  were no atrial or ventricular arrhythmias.    ABIs 12/9/2022  Moderately decreased right WENDI, 0.69.    Mildly decreased left WENDI, 0.71.    Moderately dampened PVR waveforms bilaterally.      Assessment:     1. Chronic diastolic heart failure    2. Persistent atrial fibrillation    3. Essential (primary) hypertension    4. Mixed hyperlipidemia    5. PAD (peripheral artery disease)    6. Paroxysmal A-fib    7. S/P cadaveric kidney transplant 11/5/2016. ESRD secondary to HTN/DMII    8. Stage 3a chronic kidney disease    9. Type 2 diabetes mellitus with stage 3a chronic kidney disease, with long-term current use of insulin        Plan:     Chronic diastolic CHF  NYHA Class 2. Mildly volume overloaded  Increase Lasix to 40 mg bid. Repeat BMP in 1 week  Continue Jardiance 10 mg daily   Continue low sodium diet    Persistent Afib s/p RFA  Maintaining NSR since ablation procedure. No longer on Multaq.  Continue Coreg  Continue Xarelto   Following with EP     Hypertension  Uncontrolled  Increase Hydralazine to 100 mg tid.    Increase Lasix to 40 mg bid  Continue Valsartan 320 mg daily   Continue Coreg 25 mg bid  He was on Spironolactone previously but discontinued after [facility]  Has allergy to Nifedipine. Caused gingival hyperplasia.     Hyperlipidemia   LDL at goal. Continue Lipitor 40 mg daily     PAD  Following with Dr. Gottlieb    S/p kidney transplant  CKD Stage 3  Stable    Type 2 DM   Well controlled.     Follow up in 3 months    Signed:  Nayely Vital PA-C  Cardiology   [phone] - General

## 2024-10-09 DIAGNOSIS — R00.2 PALPITATIONS: Primary | ICD-10-CM

## 2024-10-09 LAB
OHS QRS DURATION: 106 MS
OHS QTC CALCULATION: 427 MS

## 2024-10-15 ENCOUNTER — LAB VISIT (OUTPATIENT)
Dept: LAB | Facility: HOSPITAL | Age: 69
End: 2024-10-15
Attending: STUDENT IN AN ORGANIZED HEALTH CARE EDUCATION/TRAINING PROGRAM
Payer: MEDICARE

## 2024-10-15 DIAGNOSIS — D63.8 ANEMIA OF CHRONIC DISEASE: ICD-10-CM

## 2024-10-15 LAB
HCT VFR BLD AUTO: 42.9 % (ref 40–54)
HGB BLD-MCNC: 11.9 G/DL (ref 14–18)
IRON SERPL-MCNC: 77 UG/DL (ref 45–160)
SATURATED IRON: 41 % (ref 20–50)
TOTAL IRON BINDING CAPACITY: 186 UG/DL (ref 250–450)
TRANSFERRIN SERPL-MCNC: 126 MG/DL (ref 200–375)

## 2024-10-15 PROCEDURE — 85014 HEMATOCRIT: CPT | Mod: HCNC | Performed by: STUDENT IN AN ORGANIZED HEALTH CARE EDUCATION/TRAINING PROGRAM

## 2024-10-15 PROCEDURE — 36415 COLL VENOUS BLD VENIPUNCTURE: CPT | Mod: HCNC,PN | Performed by: STUDENT IN AN ORGANIZED HEALTH CARE EDUCATION/TRAINING PROGRAM

## 2024-10-15 PROCEDURE — 83540 ASSAY OF IRON: CPT | Mod: HCNC | Performed by: STUDENT IN AN ORGANIZED HEALTH CARE EDUCATION/TRAINING PROGRAM

## 2024-10-15 PROCEDURE — 85018 HEMOGLOBIN: CPT | Mod: HCNC | Performed by: STUDENT IN AN ORGANIZED HEALTH CARE EDUCATION/TRAINING PROGRAM

## 2024-10-22 ENCOUNTER — LAB VISIT (OUTPATIENT)
Dept: LAB | Facility: HOSPITAL | Age: 69
End: 2024-10-22
Payer: MEDICARE

## 2024-10-22 DIAGNOSIS — N18.32 STAGE 3B CHRONIC KIDNEY DISEASE: ICD-10-CM

## 2024-10-22 DIAGNOSIS — N18.31 STAGE 3A CHRONIC KIDNEY DISEASE: ICD-10-CM

## 2024-10-22 DIAGNOSIS — D63.8 ANEMIA OF CHRONIC DISEASE: ICD-10-CM

## 2024-10-22 LAB
ALBUMIN SERPL BCP-MCNC: 2.4 G/DL (ref 3.5–5.2)
ANION GAP SERPL CALC-SCNC: 7 MMOL/L (ref 8–16)
ANION GAP SERPL CALC-SCNC: 9 MMOL/L (ref 8–16)
BUN SERPL-MCNC: 20 MG/DL (ref 8–23)
BUN SERPL-MCNC: 21 MG/DL (ref 8–23)
CALCIUM SERPL-MCNC: 8.5 MG/DL (ref 8.7–10.5)
CALCIUM SERPL-MCNC: 8.8 MG/DL (ref 8.7–10.5)
CHLORIDE SERPL-SCNC: 107 MMOL/L (ref 95–110)
CHLORIDE SERPL-SCNC: 108 MMOL/L (ref 95–110)
CO2 SERPL-SCNC: 25 MMOL/L (ref 23–29)
CO2 SERPL-SCNC: 26 MMOL/L (ref 23–29)
CREAT SERPL-MCNC: 2 MG/DL (ref 0.5–1.4)
CREAT SERPL-MCNC: 2 MG/DL (ref 0.5–1.4)
ERYTHROCYTE [DISTWIDTH] IN BLOOD BY AUTOMATED COUNT: 16.8 % (ref 11.5–14.5)
EST. GFR  (NO RACE VARIABLE): 36.1 ML/MIN/1.73 M^2
EST. GFR  (NO RACE VARIABLE): 36.1 ML/MIN/1.73 M^2
GLUCOSE SERPL-MCNC: 138 MG/DL (ref 70–110)
GLUCOSE SERPL-MCNC: 140 MG/DL (ref 70–110)
HCT VFR BLD AUTO: 40.6 % (ref 40–54)
HCT VFR BLD AUTO: 41 % (ref 40–54)
HGB BLD-MCNC: 11.3 G/DL (ref 14–18)
HGB BLD-MCNC: 11.3 G/DL (ref 14–18)
IRON SERPL-MCNC: 59 UG/DL (ref 45–160)
MCH RBC QN AUTO: 22.7 PG (ref 27–31)
MCHC RBC AUTO-ENTMCNC: 27.6 G/DL (ref 32–36)
MCV RBC AUTO: 83 FL (ref 82–98)
PHOSPHATE SERPL-MCNC: 3.2 MG/DL (ref 2.7–4.5)
PLATELET # BLD AUTO: 120 K/UL (ref 150–450)
PMV BLD AUTO: ABNORMAL FL (ref 9.2–12.9)
POTASSIUM SERPL-SCNC: 4.1 MMOL/L (ref 3.5–5.1)
POTASSIUM SERPL-SCNC: 4.3 MMOL/L (ref 3.5–5.1)
RBC # BLD AUTO: 4.97 M/UL (ref 4.6–6.2)
SATURATED IRON: 31 % (ref 20–50)
SODIUM SERPL-SCNC: 141 MMOL/L (ref 136–145)
SODIUM SERPL-SCNC: 141 MMOL/L (ref 136–145)
TOTAL IRON BINDING CAPACITY: 188 UG/DL (ref 250–450)
TRANSFERRIN SERPL-MCNC: 127 MG/DL (ref 200–375)
WBC # BLD AUTO: 4.55 K/UL (ref 3.9–12.7)

## 2024-10-22 PROCEDURE — 85014 HEMATOCRIT: CPT | Mod: HCNC | Performed by: STUDENT IN AN ORGANIZED HEALTH CARE EDUCATION/TRAINING PROGRAM

## 2024-10-22 PROCEDURE — 83540 ASSAY OF IRON: CPT | Mod: HCNC | Performed by: STUDENT IN AN ORGANIZED HEALTH CARE EDUCATION/TRAINING PROGRAM

## 2024-10-22 PROCEDURE — 84466 ASSAY OF TRANSFERRIN: CPT | Mod: HCNC | Performed by: STUDENT IN AN ORGANIZED HEALTH CARE EDUCATION/TRAINING PROGRAM

## 2024-10-22 PROCEDURE — 85027 COMPLETE CBC AUTOMATED: CPT | Mod: HCNC | Performed by: STUDENT IN AN ORGANIZED HEALTH CARE EDUCATION/TRAINING PROGRAM

## 2024-10-22 PROCEDURE — 85018 HEMOGLOBIN: CPT | Mod: HCNC | Performed by: STUDENT IN AN ORGANIZED HEALTH CARE EDUCATION/TRAINING PROGRAM

## 2024-10-22 PROCEDURE — 80048 BASIC METABOLIC PNL TOTAL CA: CPT | Mod: HCNC | Performed by: PHYSICIAN ASSISTANT

## 2024-10-22 PROCEDURE — 80069 RENAL FUNCTION PANEL: CPT | Mod: HCNC | Performed by: STUDENT IN AN ORGANIZED HEALTH CARE EDUCATION/TRAINING PROGRAM

## 2024-10-31 ENCOUNTER — OFFICE VISIT (OUTPATIENT)
Dept: NEPHROLOGY | Facility: CLINIC | Age: 69
End: 2024-10-31
Payer: MEDICARE

## 2024-10-31 VITALS
SYSTOLIC BLOOD PRESSURE: 164 MMHG | WEIGHT: 200.19 LBS | HEIGHT: 73 IN | OXYGEN SATURATION: 98 % | BODY MASS INDEX: 26.53 KG/M2 | DIASTOLIC BLOOD PRESSURE: 76 MMHG | HEART RATE: 57 BPM

## 2024-10-31 DIAGNOSIS — N18.32 CHRONIC KIDNEY DISEASE-MINERAL BONE DISORDER (CKD-MBD) WITH STAGE 3B CHRONIC KIDNEY DISEASE: ICD-10-CM

## 2024-10-31 DIAGNOSIS — Z79.4 TYPE 2 DIABETES MELLITUS WITH STAGE 3B CHRONIC KIDNEY DISEASE, WITH LONG-TERM CURRENT USE OF INSULIN: ICD-10-CM

## 2024-10-31 DIAGNOSIS — E11.22 TYPE 2 DIABETES MELLITUS WITH STAGE 3B CHRONIC KIDNEY DISEASE, WITH LONG-TERM CURRENT USE OF INSULIN: ICD-10-CM

## 2024-10-31 DIAGNOSIS — M89.9 CHRONIC KIDNEY DISEASE-MINERAL BONE DISORDER (CKD-MBD) WITH STAGE 3B CHRONIC KIDNEY DISEASE: ICD-10-CM

## 2024-10-31 DIAGNOSIS — Z79.899 IMMUNOCOMPROMISED STATE DUE TO DRUG THERAPY: ICD-10-CM

## 2024-10-31 DIAGNOSIS — I15.0 RENOVASCULAR HYPERTENSION: ICD-10-CM

## 2024-10-31 DIAGNOSIS — N18.32 TYPE 2 DIABETES MELLITUS WITH STAGE 3B CHRONIC KIDNEY DISEASE, WITH LONG-TERM CURRENT USE OF INSULIN: ICD-10-CM

## 2024-10-31 DIAGNOSIS — I50.32 CHRONIC DIASTOLIC CONGESTIVE HEART FAILURE: ICD-10-CM

## 2024-10-31 DIAGNOSIS — D84.821 IMMUNOCOMPROMISED STATE DUE TO DRUG THERAPY: ICD-10-CM

## 2024-10-31 DIAGNOSIS — Z94.0 S/P KIDNEY TRANSPLANT: ICD-10-CM

## 2024-10-31 DIAGNOSIS — R80.9 PROTEINURIA, UNSPECIFIED TYPE: ICD-10-CM

## 2024-10-31 DIAGNOSIS — D63.8 ANEMIA OF CHRONIC DISEASE: ICD-10-CM

## 2024-10-31 DIAGNOSIS — N18.32 STAGE 3B CHRONIC KIDNEY DISEASE: Primary | ICD-10-CM

## 2024-10-31 DIAGNOSIS — E83.9 CHRONIC KIDNEY DISEASE-MINERAL BONE DISORDER (CKD-MBD) WITH STAGE 3B CHRONIC KIDNEY DISEASE: ICD-10-CM

## 2024-10-31 PROCEDURE — 99999 PR PBB SHADOW E&M-EST. PATIENT-LVL V: CPT | Mod: PBBFAC,HCNC,GC, | Performed by: STUDENT IN AN ORGANIZED HEALTH CARE EDUCATION/TRAINING PROGRAM

## 2024-10-31 RX ORDER — DOXAZOSIN 1 MG/1
1 TABLET ORAL NIGHTLY
Qty: 30 TABLET | Refills: 11 | Status: SHIPPED | OUTPATIENT
Start: 2024-10-31 | End: 2025-10-31

## 2024-10-31 RX ORDER — FUROSEMIDE 40 MG/1
80 TABLET ORAL 2 TIMES DAILY
Qty: 360 TABLET | Refills: 3 | Status: SHIPPED | OUTPATIENT
Start: 2024-10-31 | End: 2025-10-31

## 2024-11-01 DIAGNOSIS — Z94.0 KIDNEY REPLACED BY TRANSPLANT: Primary | ICD-10-CM

## 2024-11-05 DIAGNOSIS — Z94.0 KIDNEY REPLACED BY TRANSPLANT: Primary | ICD-10-CM

## 2024-11-06 ENCOUNTER — LAB VISIT (OUTPATIENT)
Dept: LAB | Facility: HOSPITAL | Age: 69
End: 2024-11-06
Payer: MEDICARE

## 2024-11-06 ENCOUNTER — OFFICE VISIT (OUTPATIENT)
Dept: DERMATOLOGY | Facility: CLINIC | Age: 69
End: 2024-11-06
Payer: MEDICARE

## 2024-11-06 DIAGNOSIS — Z94.0 KIDNEY REPLACED BY TRANSPLANT: ICD-10-CM

## 2024-11-06 DIAGNOSIS — D22.60: ICD-10-CM

## 2024-11-06 DIAGNOSIS — D17.9 LIPOMA, UNSPECIFIED SITE: ICD-10-CM

## 2024-11-06 DIAGNOSIS — L82.1 SEBORRHEIC KERATOSES: Primary | ICD-10-CM

## 2024-11-06 DIAGNOSIS — Z79.60 LONG-TERM USE OF IMMUNOSUPPRESSANT MEDICATION: ICD-10-CM

## 2024-11-06 DIAGNOSIS — N18.32 STAGE 3B CHRONIC KIDNEY DISEASE: ICD-10-CM

## 2024-11-06 DIAGNOSIS — Z12.83 SCREENING EXAM FOR SKIN CANCER: ICD-10-CM

## 2024-11-06 PROCEDURE — 3066F NEPHROPATHY DOC TX: CPT | Mod: HCNC,CPTII,S$GLB, | Performed by: STUDENT IN AN ORGANIZED HEALTH CARE EDUCATION/TRAINING PROGRAM

## 2024-11-06 PROCEDURE — 86833 HLA CLASS II HIGH DEFIN QUAL: CPT | Mod: HCNC | Performed by: STUDENT IN AN ORGANIZED HEALTH CARE EDUCATION/TRAINING PROGRAM

## 2024-11-06 PROCEDURE — 1159F MED LIST DOCD IN RCRD: CPT | Mod: HCNC,CPTII,S$GLB, | Performed by: STUDENT IN AN ORGANIZED HEALTH CARE EDUCATION/TRAINING PROGRAM

## 2024-11-06 PROCEDURE — 99999 PR PBB SHADOW E&M-EST. PATIENT-LVL II: CPT | Mod: PBBFAC,HCNC,, | Performed by: STUDENT IN AN ORGANIZED HEALTH CARE EDUCATION/TRAINING PROGRAM

## 2024-11-06 PROCEDURE — 1101F PT FALLS ASSESS-DOCD LE1/YR: CPT | Mod: HCNC,CPTII,S$GLB, | Performed by: STUDENT IN AN ORGANIZED HEALTH CARE EDUCATION/TRAINING PROGRAM

## 2024-11-06 PROCEDURE — 3044F HG A1C LEVEL LT 7.0%: CPT | Mod: HCNC,CPTII,S$GLB, | Performed by: STUDENT IN AN ORGANIZED HEALTH CARE EDUCATION/TRAINING PROGRAM

## 2024-11-06 PROCEDURE — 86832 HLA CLASS I HIGH DEFIN QUAL: CPT | Mod: HCNC | Performed by: STUDENT IN AN ORGANIZED HEALTH CARE EDUCATION/TRAINING PROGRAM

## 2024-11-06 PROCEDURE — 36415 COLL VENOUS BLD VENIPUNCTURE: CPT | Mod: HCNC | Performed by: INTERNAL MEDICINE

## 2024-11-06 PROCEDURE — 86977 RBC SERUM PRETX INCUBJ/INHIB: CPT | Mod: HCNC | Performed by: STUDENT IN AN ORGANIZED HEALTH CARE EDUCATION/TRAINING PROGRAM

## 2024-11-06 PROCEDURE — 1160F RVW MEDS BY RX/DR IN RCRD: CPT | Mod: HCNC,CPTII,S$GLB, | Performed by: STUDENT IN AN ORGANIZED HEALTH CARE EDUCATION/TRAINING PROGRAM

## 2024-11-06 PROCEDURE — 4010F ACE/ARB THERAPY RXD/TAKEN: CPT | Mod: HCNC,CPTII,S$GLB, | Performed by: STUDENT IN AN ORGANIZED HEALTH CARE EDUCATION/TRAINING PROGRAM

## 2024-11-06 PROCEDURE — 99203 OFFICE O/P NEW LOW 30 MIN: CPT | Mod: HCNC,S$GLB,, | Performed by: STUDENT IN AN ORGANIZED HEALTH CARE EDUCATION/TRAINING PROGRAM

## 2024-11-06 PROCEDURE — 1126F AMNT PAIN NOTED NONE PRSNT: CPT | Mod: HCNC,CPTII,S$GLB, | Performed by: STUDENT IN AN ORGANIZED HEALTH CARE EDUCATION/TRAINING PROGRAM

## 2024-11-06 PROCEDURE — 3288F FALL RISK ASSESSMENT DOCD: CPT | Mod: HCNC,CPTII,S$GLB, | Performed by: STUDENT IN AN ORGANIZED HEALTH CARE EDUCATION/TRAINING PROGRAM

## 2024-11-06 NOTE — PROGRESS NOTES
Subjective:      Patient ID:  Ravi Zamorano is a 68 y.o. male who presents for No chief complaint on file.    Ravi Zamorano is a 68 y.o. male who presents for: UBSE screening exam for skin cancer (full skin check was recommended due to transplant status and patient declines)    New patient    The patient has the following lesions of concern:  Location: left shoulder   Duration: years    Symptoms: none   Relieving factors/Previous treatments: none     Pertinent history:  History of blistering sunburns: No  History of tanning bed use: No  Family history of melanoma: No  Personal history of mole removal: No  Personal history of skin cancer: No  Hx of kidney transplant 2016 - Prograf, CellCept      Review of Systems   Skin:  Positive for wears hat. Negative for daily sunscreen use, activity-related sunscreen use and recent sunburn.   Hematologic/Lymphatic: Bruises/bleeds easily (blood thinners daily).       Objective:   Physical Exam   Constitutional: He appears well-developed and well-nourished. No distress.   Neurological: He is alert and oriented to person, place, and time. He is not disoriented.   Psychiatric: He has a normal mood and affect.   Skin:   Areas Examined (abnormalities noted in diagram):   Scalp / Hair Palpated and Inspected  Head / Face Inspection Performed  Neck Inspection Performed  Chest / Axilla Inspection Performed  Abdomen Inspection Performed  Genitals / Buttocks / Groin Inspection Performed  Back Inspection Performed  RUE Inspected  LUE Inspection Performed            Diagram Legend     Erythematous scaling macule/papule c/w actinic keratosis       Vascular papule c/w angioma      Pigmented verrucoid papule/plaque c/w seborrheic keratosis      Yellow umbilicated papule c/w sebaceous hyperplasia      Irregularly shaped tan macule c/w lentigo     1-2 mm smooth white papules consistent with Milia      Movable subcutaneous cyst with punctum c/w epidermal inclusion cyst      Subcutaneous  movable cyst c/w pilar cyst      Firm pink to brown papule c/w dermatofibroma      Pedunculated fleshy papule(s) c/w skin tag(s)      Evenly pigmented macule c/w junctional nevus     Mildly variegated pigmented, slightly irregular-bordered macule c/w mildly atypical nevus      Flesh colored to evenly pigmented papule c/w intradermal nevus       Pink pearly papule/plaque c/w basal cell carcinoma      Erythematous hyperkeratotic cursted plaque c/w SCC      Surgical scar with no sign of skin cancer recurrence      Open and closed comedones      Inflammatory papules and pustules      Verrucoid papule consistent consistent with wart     Erythematous eczematous patches and plaques     Dystrophic onycholytic nail with subungual debris c/w onychomycosis     Umbilicated papule    Erythematous-base heme-crusted tan verrucoid plaque consistent with inflamed seborrheic keratosis     Erythematous Silvery Scaling Plaque c/w Psoriasis     See annotation      Assessment / Plan:        Seborrheic keratoses  These are benign inherited growths without a malignant potential. Reassurance given to patient. No treatment is necessary.   L shoulder reassurance    Castrejon's nevus of shoulder, unspecified laterality  Reassurance    Lipoma, unspecified site  Reassurance  He has had a few excised before  Recommend to return to care for growth or pain    Long-term use of immunosuppressant medication  Recommend annual skin exam with dermatology, reviewed increased risk skin cancer  Pt declines FBSE exam today wants UBSE  I discussed ABCDE and counseled him to check his entire body including feet monthly and return for any concerning lesions, provided handout    Screening exam for skin cancer  Upper body skin examination performed today including at least 6 points as noted in physical examination. No lesions suspicious for malignancy noted.    Recommend daily sun protection/avoidance and use of at least SPF 30, broad spectrum sunscreen (OTC drug).      RTC 1 year, sooner prn

## 2024-11-08 LAB
ALLOSURE COMMENT: NORMAL
ALLOSURE SCORE KIDNEY: 0.22 %
CLASS I ANTIBODY COMMENTS - LUMINEX: NORMAL
CLASS II ANTIBODY COMMENTS - LUMINEX: NORMAL
DSA1 TESTING DATE: NORMAL
DSA12 TESTING DATE: NORMAL
DSA2 TESTING DATE: NORMAL
INFORMATION: NORMAL
SERUM COLLECTION DT - LUMINEX CLASS I: NORMAL
SERUM COLLECTION DT - LUMINEX CLASS II: NORMAL

## 2024-11-19 ENCOUNTER — LAB VISIT (OUTPATIENT)
Dept: LAB | Facility: HOSPITAL | Age: 69
End: 2024-11-19
Payer: MEDICARE

## 2024-11-19 DIAGNOSIS — Z79.4 TYPE 2 DIABETES MELLITUS WITH STAGE 3A CHRONIC KIDNEY DISEASE, WITH LONG-TERM CURRENT USE OF INSULIN: ICD-10-CM

## 2024-11-19 DIAGNOSIS — N18.32 STAGE 3B CHRONIC KIDNEY DISEASE: ICD-10-CM

## 2024-11-19 DIAGNOSIS — D63.8 ANEMIA OF CHRONIC DISEASE: ICD-10-CM

## 2024-11-19 DIAGNOSIS — N18.31 TYPE 2 DIABETES MELLITUS WITH STAGE 3A CHRONIC KIDNEY DISEASE, WITH LONG-TERM CURRENT USE OF INSULIN: ICD-10-CM

## 2024-11-19 DIAGNOSIS — E11.22 TYPE 2 DIABETES MELLITUS WITH STAGE 3A CHRONIC KIDNEY DISEASE, WITH LONG-TERM CURRENT USE OF INSULIN: ICD-10-CM

## 2024-11-19 LAB
ALBUMIN SERPL BCP-MCNC: 2.7 G/DL (ref 3.5–5.2)
ANION GAP SERPL CALC-SCNC: 8 MMOL/L (ref 8–16)
BUN SERPL-MCNC: 30 MG/DL (ref 8–23)
CALCIUM SERPL-MCNC: 8.9 MG/DL (ref 8.7–10.5)
CHLORIDE SERPL-SCNC: 102 MMOL/L (ref 95–110)
CO2 SERPL-SCNC: 28 MMOL/L (ref 23–29)
CREAT SERPL-MCNC: 2.4 MG/DL (ref 0.5–1.4)
ERYTHROCYTE [DISTWIDTH] IN BLOOD BY AUTOMATED COUNT: 17.1 % (ref 11.5–14.5)
EST. GFR  (NO RACE VARIABLE): 28.9 ML/MIN/1.73 M^2
ESTIMATED AVG GLUCOSE: 166 MG/DL (ref 68–131)
GLUCOSE SERPL-MCNC: 157 MG/DL (ref 70–110)
HBA1C MFR BLD: 7.4 % (ref 4–5.6)
HCT VFR BLD AUTO: 36.5 % (ref 40–54)
HCT VFR BLD AUTO: 36.5 % (ref 40–54)
HGB BLD-MCNC: 10.8 G/DL (ref 14–18)
HGB BLD-MCNC: 10.8 G/DL (ref 14–18)
IRON SERPL-MCNC: 56 UG/DL (ref 45–160)
MCH RBC QN AUTO: 23 PG (ref 27–31)
MCHC RBC AUTO-ENTMCNC: 29.6 G/DL (ref 32–36)
MCV RBC AUTO: 78 FL (ref 82–98)
PHOSPHATE SERPL-MCNC: 4 MG/DL (ref 2.7–4.5)
PLATELET # BLD AUTO: 123 K/UL (ref 150–450)
PMV BLD AUTO: ABNORMAL FL (ref 9.2–12.9)
POTASSIUM SERPL-SCNC: 4.3 MMOL/L (ref 3.5–5.1)
RBC # BLD AUTO: 4.69 M/UL (ref 4.6–6.2)
SATURATED IRON: 29 % (ref 20–50)
SODIUM SERPL-SCNC: 138 MMOL/L (ref 136–145)
TOTAL IRON BINDING CAPACITY: 195 UG/DL (ref 250–450)
TRANSFERRIN SERPL-MCNC: 132 MG/DL (ref 200–375)
WBC # BLD AUTO: 4.84 K/UL (ref 3.9–12.7)

## 2024-11-19 PROCEDURE — 83540 ASSAY OF IRON: CPT | Mod: HCNC | Performed by: STUDENT IN AN ORGANIZED HEALTH CARE EDUCATION/TRAINING PROGRAM

## 2024-11-19 PROCEDURE — 85027 COMPLETE CBC AUTOMATED: CPT | Mod: HCNC | Performed by: STUDENT IN AN ORGANIZED HEALTH CARE EDUCATION/TRAINING PROGRAM

## 2024-11-19 PROCEDURE — 83036 HEMOGLOBIN GLYCOSYLATED A1C: CPT | Mod: HCNC | Performed by: NURSE PRACTITIONER

## 2024-11-19 PROCEDURE — 80069 RENAL FUNCTION PANEL: CPT | Mod: HCNC | Performed by: STUDENT IN AN ORGANIZED HEALTH CARE EDUCATION/TRAINING PROGRAM

## 2024-11-19 PROCEDURE — 36415 COLL VENOUS BLD VENIPUNCTURE: CPT | Mod: HCNC,PN | Performed by: STUDENT IN AN ORGANIZED HEALTH CARE EDUCATION/TRAINING PROGRAM

## 2024-11-20 ENCOUNTER — COMMITTEE REVIEW (OUTPATIENT)
Dept: TRANSPLANT | Facility: CLINIC | Age: 69
End: 2024-11-20
Payer: MEDICARE

## 2024-11-20 NOTE — PROGRESS NOTES
CHIEF COMPLAINT: Type 2 Diabetes     HPI: Mr. Ravi Zamorano is a 69 y.o. male who was diagnosed with Type 2 DM in 2000.  S/p kidney transplant 2016    F/u with cards, had ablation in 2024.   F/u with hem/onc, thrombocytopenia   Had bone marrow in summer 2024.   Having false lows.  6/2024, PHUONG, edema.  Still dealing with ankle edema.   Weight in summer 2024 187-191#  Today 209#   Has f/u with nephrology this month and cardiology in 2025-jan.   H/o severe hypoglycemia < 38 mg/dl    Using maycol 2.  On maintenance w/ pred.                    Lab Results   Component Value Date    HGBA1C 7.4 (H) 11/19/2024       Past Medical History:   Diagnosis Date    Anticoagulant long-term use     Anxiety 07/29/2014    Arthritis     atrial fibrillation     Bilateral diabetic retinopathy 2017    CKD (chronic kidney disease) stage 2, GFR 60-89 ml/min 12/28/2016    CKD (chronic kidney disease) stage 4, GFR 15-29 ml/min 07/29/2014    Colon polyps 2014    Depression - situational 07/29/2014    Diabetes mellitus     Diabetes type 2 since 2000 07/29/2014    Diabetic neuropathy 07/29/2014    Encounter for blood transfusion     History of cardioversion 10/03/2019    History of hepatitis C, s/p successful Rx w/ SVR24 - 9/2017 07/29/2014    Genotype 1a, treatment naive 10/2014 liver biopsy - grade 1 / stage 1 Completed Harvoni w/ SVR    Hyperlipidemia 07/29/2014    Hypertension     Neuropathy     Organ transplant candidate 07/29/2014    Pancreatitis     S/P cadaveric kidney transplant 11/5/2016. ESRD secondary to HTN/DMII 11/05/2016    Stage 3a chronic kidney disease 12/28/2016    Type 2 diabetes mellitus with stage 3a chronic kidney disease, with long-term current use of insulin 07/29/2014     Remains on MDI---lantus and humalog     PREVIOUS DIABETES MEDICATIONS TRIED  lantus  humalog  novolog  levemir  trulicity -wt loss   tradjenta  Metformin   ozempic   Jardiance     CURRENT DIABETIC MEDS: lantus 20 units in am, novolog 10-10-5 units  ac w/ scale 180-230+2, etc, jardiance 10 mg daily     On MDI (injections 4 x a day)   Makes frequent changes to his/her insulin regimen on the basis of blood glucose data  Testing 4 x a day max   Patient is willing and able to use the device  Demonstrated an understanding of the technology and is motivated to use CGM  Patient expected to adhere to a comprehensive diabetes treatment plan and patient has adequate medical supervision    Has multiple impaired awareness of hypoglycemia (hypoglycemia unawareness)  Needs work with dietary habits    Diabetes Management Status    Statin: Taking  ACE/ARB: Taking    Screening or Prevention Patient's value Goal Complete/Controlled?   HgA1C Testing and Control   Lab Results   Component Value Date    HGBA1C 7.4 (H) 11/19/2024      Annually/Less than 8% Yes   Lipid profile : 07/01/2024 Annually Yes   LDL control Lab Results   Component Value Date    LDLCALC 48.6 (L) 07/01/2024    Annually/Less than 100 mg/dl  Yes   Nephropathy screening Lab Results   Component Value Date    LABMICR 544.0 03/01/2023     Lab Results   Component Value Date    PROTEINUA 1+ (A) 07/11/2024    Annually Yes   Blood pressure BP Readings from Last 1 Encounters:   11/21/24 (!) 144/72    Less than 140/90 No   Dilated retinal exam : 08/08/2024 Annually Yes   Foot exam   : 07/03/2024 Annually No     REVIEW OF SYSTEMS  General: no weakness, fatigue, + weight changes 20# gain   Eyes: no double or blurred vision, eye pain, or redness  Cardiovascular: no chest pain, palpitations, + ankle edema, or murmurs.   Respiratory: no cough or dyspnea.   GI: no heartburn, nausea, or changes in bowel patterns; good appetite.   Skin: no rashes, dryness, itching, or reactions at insulin injection sites.  Neuro: + numbness, tingling-gabapentin,RLS, tremors, or vertigo. +loss of strength in legs  Endocrine: no polyuria, polydipsia, polyphagia, heat or cold intolerance.     Vital Signs  BP (!) 142/62 (BP Location: Left arm, Patient  "Position: Sitting)   Pulse (!) 54   Ht 6' 1" (1.854 m)   Wt 95.1 kg (209 lb 10.5 oz)   SpO2 99%   BMI 27.66 kg/m²     Hemoglobin A1C   Date Value Ref Range Status   11/19/2024 7.4 (H) 4.0 - 5.6 % Final     Comment:     ADA Screening Guidelines:  5.7-6.4%  Consistent with prediabetes  >or=6.5%  Consistent with diabetes    High levels of fetal hemoglobin interfere with the HbA1C  assay. Heterozygous hemoglobin variants (HbS, HgC, etc)do  not significantly interfere with this assay.   However, presence of multiple variants may affect accuracy.     07/11/2024 6.7 (H) 4.0 - 5.6 % Final     Comment:     ADA Screening Guidelines:  5.7-6.4%  Consistent with prediabetes  >or=6.5%  Consistent with diabetes    High levels of fetal hemoglobin interfere with the HbA1C  assay. Heterozygous hemoglobin variants (HbS, HgC, etc)do  not significantly interfere with this assay.   However, presence of multiple variants may affect accuracy.     05/02/2024 6.8 (H) 4.0 - 5.6 % Final     Comment:     ADA Screening Guidelines:  5.7-6.4%  Consistent with prediabetes  >or=6.5%  Consistent with diabetes    High levels of fetal hemoglobin interfere with the HbA1C  assay. Heterozygous hemoglobin variants (HbS, HgC, etc)do  not significantly interfere with this assay.   However, presence of multiple variants may affect accuracy.          Chemistry        Component Value Date/Time     11/19/2024 0723    K 4.3 11/19/2024 0723     11/19/2024 0723    CO2 28 11/19/2024 0723    BUN 30 (H) 11/19/2024 0723    CREATININE 2.4 (H) 11/19/2024 0723     (H) 11/19/2024 0723        Component Value Date/Time    CALCIUM 8.9 11/19/2024 0723    ALKPHOS 45 (L) 06/16/2024 0408    AST 14 06/16/2024 0408    ALT <5 (L) 06/16/2024 0408    BILITOT 0.6 06/16/2024 0408    ESTGFRAFRICA 51.9 (A) 07/19/2022 0723    EGFRNONAA 44.9 (A) 07/19/2022 0723           Lab Results   Component Value Date    TSH 0.924 04/01/2024      Lab Results   Component Value " Date    CHOL 112 (L) 07/01/2024    CHOL 131 06/07/2023    CHOL 132 03/01/2023     Lab Results   Component Value Date    HDL 26 (L) 07/01/2024    HDL 31 (L) 06/07/2023    HDL 33 (L) 03/01/2023     Lab Results   Component Value Date    LDLCALC 48.6 (L) 07/01/2024    LDLCALC 61.4 (L) 06/07/2023    LDLCALC 71.4 03/01/2023     Lab Results   Component Value Date    TRIG 187 (H) 07/01/2024    TRIG 193 (H) 06/07/2023    TRIG 138 03/01/2023     Lab Results   Component Value Date    CHOLHDL 23.2 07/01/2024    CHOLHDL 23.7 06/07/2023    CHOLHDL 25.0 03/01/2023       PHYSICAL EXAMINATION  Constitutional: Appears well, no distress. Reviewed vitals above.  Eyes: conjunctivae & lids intact; PERRLA, EOMs intact; optic discs   Neck: Supple, trachea midline.   Respiratory: no wheezes, even and unlabored  Cardiovascular: RRR; s1s2   Lymph: deferred   Skin: warm and dry; no injection site reactions, no acanthosis nigracans observed.  Neuro:patient alert and cooperative, normal affect; steady gait.  Psychiatric: judgement & insight intact, orientation of time, place & person intact, memory; mood & affect wnl     Diabetes Foot Exam:   Deferred     Assessment/Plan    1. Type 2 diabetes mellitus with stage 3a chronic kidney disease, with long-term current use of insulin  Hemoglobin A1C next time   F/u in 4 months w/ me   Continue sglt2i   A1c goal less than 7.5%  Continue regimen above   Cut novolog 10-7-7 units before meals scale 180-230+2, etc.   Continue lantus 20 units in am   Cut in 1/2 to 4-5 units before meals for smaller meals         2. Diabetic polyneuropathy associated with type 2 diabetes mellitus  F/u with podiatry  Stable   On gabapentin      3. Essential (primary) hypertension  Managed per nephrology, cards  On arb       4. Mixed hyperlipidemia  Lab Results   Component Value Date    LDLCALC 48.6 (L) 07/01/2024     A goal       5. Depression, unspecified depression type  Stable, no issues at this time      6. Chronic  diastolic heart failure  F/u with cards   Stable       7. Persistent atrial fibrillation  F/u with cards , on bb      8. PAD (peripheral artery disease)  F/u with vascular   Stable      9. Claudication of both lower extremities  See above       10. 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 Type 2 diabetes mellitus with both eyes affected by moderate nonproliferative retinopathy without macular edema, without long-term current use of insulin  insulin aspart U-100 (NOVOLOG) 100 unit/mL (3 mL) InPn pen   12.    CKD 4                                                                    f/u with nephrology  13.    Ankle edema-bilateral                                            f/u w/ cards, nephrology, on lasix 80 mg bid

## 2024-11-20 NOTE — COMMITTEE REVIEW
11/2024 Committee Discussion  Kidney Txp Date: 11/5/2016  Dr. Sosa requested to discuss DSA results from 11/7/24.  New ClassII DSA detected: DP1 (9375).    Plan Please obtain a Kidney Transplant U/S.     I was present at the meeting and attest to the decision of the committee.   Pt called in to let us know that she will not be getting the EGD done that Dr. Emery ordered.

## 2024-11-21 ENCOUNTER — OFFICE VISIT (OUTPATIENT)
Dept: INTERNAL MEDICINE | Facility: CLINIC | Age: 69
End: 2024-11-21
Payer: MEDICARE

## 2024-11-21 VITALS
HEART RATE: 54 BPM | OXYGEN SATURATION: 99 % | HEIGHT: 73 IN | BODY MASS INDEX: 27.79 KG/M2 | WEIGHT: 209.69 LBS | SYSTOLIC BLOOD PRESSURE: 144 MMHG | DIASTOLIC BLOOD PRESSURE: 72 MMHG

## 2024-11-21 DIAGNOSIS — Z79.4 TYPE 2 DIABETES MELLITUS WITH STAGE 3A CHRONIC KIDNEY DISEASE, WITH LONG-TERM CURRENT USE OF INSULIN: Primary | ICD-10-CM

## 2024-11-21 DIAGNOSIS — M89.9 CHRONIC KIDNEY DISEASE-MINERAL BONE DISORDER (CKD-MBD) WITH STAGE 3B CHRONIC KIDNEY DISEASE: ICD-10-CM

## 2024-11-21 DIAGNOSIS — E78.2 MIXED HYPERLIPIDEMIA: ICD-10-CM

## 2024-11-21 DIAGNOSIS — I10 ESSENTIAL (PRIMARY) HYPERTENSION: ICD-10-CM

## 2024-11-21 DIAGNOSIS — M25.471 ANKLE EDEMA, BILATERAL: ICD-10-CM

## 2024-11-21 DIAGNOSIS — I73.9 PAD (PERIPHERAL ARTERY DISEASE): ICD-10-CM

## 2024-11-21 DIAGNOSIS — N18.32 CHRONIC KIDNEY DISEASE-MINERAL BONE DISORDER (CKD-MBD) WITH STAGE 3B CHRONIC KIDNEY DISEASE: ICD-10-CM

## 2024-11-21 DIAGNOSIS — E11.22 TYPE 2 DIABETES MELLITUS WITH STAGE 3A CHRONIC KIDNEY DISEASE, WITH LONG-TERM CURRENT USE OF INSULIN: Primary | ICD-10-CM

## 2024-11-21 DIAGNOSIS — I73.9 CLAUDICATION OF BOTH LOWER EXTREMITIES: ICD-10-CM

## 2024-11-21 DIAGNOSIS — I48.19 PERSISTENT ATRIAL FIBRILLATION: ICD-10-CM

## 2024-11-21 DIAGNOSIS — E11.3393 TYPE 2 DIABETES MELLITUS WITH BOTH EYES AFFECTED BY MODERATE NONPROLIFERATIVE RETINOPATHY WITHOUT MACULAR EDEMA, WITHOUT LONG-TERM CURRENT USE OF INSULIN: ICD-10-CM

## 2024-11-21 DIAGNOSIS — E83.9 CHRONIC KIDNEY DISEASE-MINERAL BONE DISORDER (CKD-MBD) WITH STAGE 3B CHRONIC KIDNEY DISEASE: ICD-10-CM

## 2024-11-21 DIAGNOSIS — I50.32 CHRONIC DIASTOLIC HEART FAILURE: ICD-10-CM

## 2024-11-21 DIAGNOSIS — F32.A DEPRESSION, UNSPECIFIED DEPRESSION TYPE: ICD-10-CM

## 2024-11-21 DIAGNOSIS — N18.4 CHRONIC KIDNEY DISEASE (CKD), STAGE IV (SEVERE): ICD-10-CM

## 2024-11-21 DIAGNOSIS — N18.31 TYPE 2 DIABETES MELLITUS WITH STAGE 3A CHRONIC KIDNEY DISEASE, WITH LONG-TERM CURRENT USE OF INSULIN: Primary | ICD-10-CM

## 2024-11-21 DIAGNOSIS — E11.42 DIABETIC POLYNEUROPATHY ASSOCIATED WITH TYPE 2 DIABETES MELLITUS: ICD-10-CM

## 2024-11-21 DIAGNOSIS — M25.472 ANKLE EDEMA, BILATERAL: ICD-10-CM

## 2024-11-21 PROCEDURE — 99999 PR PBB SHADOW E&M-EST. PATIENT-LVL V: CPT | Mod: PBBFAC,HCNC,, | Performed by: NURSE PRACTITIONER

## 2024-11-21 RX ORDER — ERGOCALCIFEROL 1.25 MG/1
50000 CAPSULE ORAL
Qty: 4 CAPSULE | Refills: 6 | Status: SHIPPED | OUTPATIENT
Start: 2024-11-21 | End: 2025-01-21

## 2024-11-21 RX ORDER — INSULIN ASPART 100 [IU]/ML
24-57 INJECTION, SOLUTION INTRAVENOUS; SUBCUTANEOUS DAILY
Qty: 30 ML | Refills: 11 | Status: SHIPPED | OUTPATIENT
Start: 2024-11-21

## 2024-11-21 RX ORDER — INSULIN ASPART 100 [IU]/ML
INJECTION, SOLUTION INTRAVENOUS; SUBCUTANEOUS
Start: 2024-11-21 | End: 2024-11-21 | Stop reason: SDUPTHER

## 2024-11-21 NOTE — PATIENT INSTRUCTIONS
Cut novolog 10-7-7 units before bqqpicetq-awlwz-xbpqgv scale 180-230+2, etc.   Continue lantus 20 units in am   Cut in 1/2 to 4-5 units before meals for smaller meals   Continue jardiance 10 mg daily     Lab Results   Component Value Date    HGBA1C 7.4 (H) 11/19/2024     Goal less than 7.5%    [email or web address]  Eat fit marion  MyBeiZnesspal marion  [email or web address]    mySugr marion       Goal  no higher than 180     Snacks can be an important part of a balanced, healthy meal plan. They allow you to eat more frequently, feeling full and satisfied throughout the day. Also, they allow you to spread carbohydrates evenly, which may stabilize blood sugars.  Plus, snacks are enjoyable!     The amount of carbohydrate needed at snacks varies. Generally, about 15-30 grams of carbohydrate per snack is recommended.  Below you will find some tasty treats.       0-5 gm carb  Crystal Light  Vitamin Water Zero  Herbal tea, unsweetened  2 tsp peanut butter on celery  1./2 cup sugar-free jell-o  1 sugar-free popsicle  ¼ cup blueberries  8oz Blue Nancy unsweetened almond milk  5 baby carrots & celery sticks, cucumbers, bell peppers dipped in ¼ cup salsa, 2Tbsp light ranch dressing or 2Tbsp plain Greek yogurt  10 Goldfish crackers  ½ oz low-fat cheese or string cheese  1 closed handful of nuts, unsalted  1 Tbsp of sunflower seeds, unsalted  1 cup Smart Pop popcorn  1 whole grain brown rice cake        15 gm carb  1 small piece of fruit or ½ banana or 1/2 cup lite canned fruit  3 ismael cracker squares  3 cups Smart Pop popcorn, top spray butter, Rosales lite salt or cinnamon and Truvia  5 Vanilla Wafers  ½ cup low fat, no added sugar ice cream or frozen yogurt (Blue bell, Blue Bunny, Weight Watchers, Skinny Cow)  ½ turkey, ham, or chicken sandwich  ½ c fruit with ½ c Cottage cheese  4-6 unsalted wheat crackers with 1 oz low fat cheese or 1 tbsp peanut butter   30-45 goldfish crackers (depending on flavor)   7-8 Restoration mini  brown rice cakes (caramel, apple cinnamon, chocolate)   12 Druze mini brown rice cakes (cheddar, bbq, ranch)   1/3 cup hummus dip with raw veg  1/2 whole wheat stephanie, 1Tbsp hummus  Mini Pizza (1/2 whole wheat English muffin, low-fat  cheese, tomato sauce)  100 calorie snack pack (Oreo, Chips Ahoy, Ritz Mix, Baked Cheetos)  4-6 oz. light or Greek Style yogurt (Chobani, Yoplait, Okios, [place])  ½ cup sugar-free pudding    6 in. wheat tortilla or stephanie oven toasted chips (topped with spray butter flavoring, cinnamon, Truvia OR spray butter, garlic powder, chili powder)   18 BBQ Popchips (available at [employer], [employer], Fresh Market)

## 2024-11-25 DIAGNOSIS — Z94.0 KIDNEY TRANSPLANT STATUS: Primary | ICD-10-CM

## 2024-11-26 DIAGNOSIS — N18.32 STAGE 3B CHRONIC KIDNEY DISEASE: Primary | ICD-10-CM

## 2024-11-26 DIAGNOSIS — D84.821 IMMUNOCOMPROMISED STATE DUE TO DRUG THERAPY: ICD-10-CM

## 2024-11-26 DIAGNOSIS — Z94.0 KIDNEY TRANSPLANT STATUS: ICD-10-CM

## 2024-11-26 DIAGNOSIS — Z94.0 S/P KIDNEY TRANSPLANT: ICD-10-CM

## 2024-11-26 DIAGNOSIS — Z79.899 IMMUNOCOMPROMISED STATE DUE TO DRUG THERAPY: ICD-10-CM

## 2024-11-26 DIAGNOSIS — Z94.0 KIDNEY REPLACED BY TRANSPLANT: Primary | ICD-10-CM

## 2024-11-27 DIAGNOSIS — Z94.0 KIDNEY REPLACED BY TRANSPLANT: ICD-10-CM

## 2024-11-27 DIAGNOSIS — E11.22 TYPE 2 DIABETES MELLITUS WITH STAGE 3A CHRONIC KIDNEY DISEASE, WITH LONG-TERM CURRENT USE OF INSULIN: ICD-10-CM

## 2024-11-27 DIAGNOSIS — N18.31 TYPE 2 DIABETES MELLITUS WITH STAGE 3A CHRONIC KIDNEY DISEASE, WITH LONG-TERM CURRENT USE OF INSULIN: ICD-10-CM

## 2024-11-27 DIAGNOSIS — Z79.4 TYPE 2 DIABETES MELLITUS WITH STAGE 3A CHRONIC KIDNEY DISEASE, WITH LONG-TERM CURRENT USE OF INSULIN: ICD-10-CM

## 2024-11-27 RX ORDER — PREDNISONE 5 MG/1
5 TABLET ORAL DAILY
Qty: 30 TABLET | Refills: 11 | Status: CANCELLED | OUTPATIENT
Start: 2024-11-27

## 2024-11-27 NOTE — TELEPHONE ENCOUNTER
Refill Routing Note   Medication(s) are not appropriate for processing by [facility] for the following reason(s):        Required labs abnormal    [facility] action(s):  Defer             Appointments  past 12m or future 3m with PCP    Date Provider   Last Visit   8/21/2024 Mima Mack MD   Next Visit   3/7/2025 Mima Mack MD   ED visits in past 90 days: 0        Note composed:4:38 PM 11/27/2024

## 2024-11-27 NOTE — TELEPHONE ENCOUNTER
No care due was identified.  [facility] Embedded Care Due Messages. Reference number: [id number].   11/27/2024 4:37:28 PM CST

## 2024-11-29 DIAGNOSIS — Z94.0 KIDNEY REPLACED BY TRANSPLANT: ICD-10-CM

## 2024-11-29 RX ORDER — PREDNISONE 5 MG/1
5 TABLET ORAL DAILY
Qty: 30 TABLET | Refills: 11 | Status: CANCELLED | OUTPATIENT
Start: 2024-11-27

## 2024-12-02 DIAGNOSIS — Z94.0 KIDNEY REPLACED BY TRANSPLANT: ICD-10-CM

## 2024-12-02 RX ORDER — PREDNISONE 5 MG/1
5 TABLET ORAL DAILY
Qty: 30 TABLET | Refills: 11 | Status: CANCELLED | OUTPATIENT
Start: 2024-11-27

## 2024-12-03 DIAGNOSIS — Z94.0 KIDNEY REPLACED BY TRANSPLANT: ICD-10-CM

## 2024-12-03 RX ORDER — PREDNISONE 5 MG/1
5 TABLET ORAL DAILY
Qty: 30 TABLET | Refills: 11 | Status: ON HOLD | OUTPATIENT
Start: 2024-12-03

## 2024-12-04 ENCOUNTER — HOSPITAL ENCOUNTER (OUTPATIENT)
Dept: RADIOLOGY | Facility: HOSPITAL | Age: 69
Discharge: HOME | End: 2024-12-04
Attending: INTERNAL MEDICINE
Payer: MEDICARE

## 2024-12-04 DIAGNOSIS — Z94.0 KIDNEY REPLACED BY TRANSPLANT: ICD-10-CM

## 2024-12-04 PROCEDURE — 76776 US EXAM K TRANSPL W/DOPPLER: CPT | Mod: TC,HCNC

## 2024-12-04 PROCEDURE — 76776 US EXAM K TRANSPL W/DOPPLER: CPT | Mod: 26,HCNC,, | Performed by: RADIOLOGY

## 2024-12-06 ENCOUNTER — LAB VISIT (OUTPATIENT)
Dept: LAB | Facility: HOSPITAL | Age: 69
End: 2024-12-06
Payer: MEDICARE

## 2024-12-06 DIAGNOSIS — D63.8 ANEMIA OF CHRONIC DISEASE: ICD-10-CM

## 2024-12-06 DIAGNOSIS — Z94.0 KIDNEY REPLACED BY TRANSPLANT: ICD-10-CM

## 2024-12-06 DIAGNOSIS — N18.32 STAGE 3B CHRONIC KIDNEY DISEASE: ICD-10-CM

## 2024-12-06 DIAGNOSIS — Z94.0 KIDNEY TRANSPLANT STATUS: ICD-10-CM

## 2024-12-06 LAB
ALBUMIN SERPL BCP-MCNC: 2.7 G/DL (ref 3.5–5.2)
ALBUMIN/CREAT UR: NORMAL UG/MG (ref 0–30)
ANION GAP SERPL CALC-SCNC: 7 MMOL/L (ref 8–16)
BACTERIA #/AREA URNS AUTO: ABNORMAL /HPF
BACTERIA #/AREA URNS AUTO: ABNORMAL /HPF
BILIRUB UR QL STRIP: NEGATIVE
BILIRUB UR QL STRIP: NEGATIVE
BUN SERPL-MCNC: 37 MG/DL (ref 8–23)
CALCIUM SERPL-MCNC: 8.5 MG/DL (ref 8.7–10.5)
CHLORIDE SERPL-SCNC: 100 MMOL/L (ref 95–110)
CLARITY UR REFRACT.AUTO: CLEAR
CLARITY UR REFRACT.AUTO: CLEAR
CO2 SERPL-SCNC: 26 MMOL/L (ref 23–29)
COLOR UR AUTO: YELLOW
COLOR UR AUTO: YELLOW
CREAT SERPL-MCNC: 2.4 MG/DL (ref 0.5–1.4)
CREAT UR-MCNC: 110 MG/DL (ref 23–375)
CREAT UR-MCNC: 110 MG/DL (ref 23–375)
EST. GFR  (NO RACE VARIABLE): 28.9 ML/MIN/1.73 M^2
GLUCOSE SERPL-MCNC: 245 MG/DL (ref 70–110)
GLUCOSE UR QL STRIP: ABNORMAL
GLUCOSE UR QL STRIP: ABNORMAL
HCT VFR BLD AUTO: 36.4 % (ref 40–54)
HGB BLD-MCNC: 10.4 G/DL (ref 14–18)
HGB UR QL STRIP: ABNORMAL
HGB UR QL STRIP: ABNORMAL
HYALINE CASTS UR QL AUTO: 0 /LPF
HYALINE CASTS UR QL AUTO: 1 /LPF
IRON SERPL-MCNC: 73 UG/DL (ref 45–160)
KETONES UR QL STRIP: NEGATIVE
KETONES UR QL STRIP: NEGATIVE
LEUKOCYTE ESTERASE UR QL STRIP: NEGATIVE
LEUKOCYTE ESTERASE UR QL STRIP: NEGATIVE
MICROALBUMIN UR DL<=1MG/L-MCNC: >5000 UG/ML
MICROSCOPIC COMMENT: ABNORMAL
MICROSCOPIC COMMENT: ABNORMAL
NITRITE UR QL STRIP: NEGATIVE
NITRITE UR QL STRIP: NEGATIVE
NON-SQ EPI CELLS #/AREA URNS AUTO: 0 /HPF
PH UR STRIP: 6 [PH] (ref 5–8)
PH UR STRIP: 7 [PH] (ref 5–8)
PHOSPHATE SERPL-MCNC: 3.1 MG/DL (ref 2.7–4.5)
POTASSIUM SERPL-SCNC: 4.2 MMOL/L (ref 3.5–5.1)
PROT UR QL STRIP: ABNORMAL
PROT UR QL STRIP: ABNORMAL
PROT UR-MCNC: 836 MG/DL (ref 0–15)
PROT/CREAT UR: 7.6 MG/G{CREAT} (ref 0–0.2)
RBC #/AREA URNS AUTO: 2 /HPF (ref 0–4)
RBC #/AREA URNS AUTO: 2 /HPF (ref 0–4)
SATURATED IRON: 37 % (ref 20–50)
SODIUM SERPL-SCNC: 133 MMOL/L (ref 136–145)
SP GR UR STRIP: 1.02 (ref 1–1.03)
SP GR UR STRIP: 1.02 (ref 1–1.03)
SQUAMOUS #/AREA URNS AUTO: 0 /HPF
SQUAMOUS #/AREA URNS AUTO: 0 /HPF
TACROLIMUS BLD-MCNC: 24 NG/ML (ref 5–15)
TACROLIMUS BLD-MCNC: 24 NG/ML (ref 5–15)
TOTAL IRON BINDING CAPACITY: 195 UG/DL (ref 250–450)
TRANSFERRIN SERPL-MCNC: 132 MG/DL (ref 200–375)
URN SPEC COLLECT METH UR: ABNORMAL
URN SPEC COLLECT METH UR: ABNORMAL
WBC #/AREA URNS AUTO: 1 /HPF (ref 0–5)
WBC #/AREA URNS AUTO: 1 /HPF (ref 0–5)
YEAST UR QL AUTO: ABNORMAL
YEAST UR QL AUTO: ABNORMAL

## 2024-12-06 PROCEDURE — 81001 URINALYSIS AUTO W/SCOPE: CPT | Mod: HCNC | Performed by: STUDENT IN AN ORGANIZED HEALTH CARE EDUCATION/TRAINING PROGRAM

## 2024-12-06 PROCEDURE — 82043 UR ALBUMIN QUANTITATIVE: CPT | Mod: HCNC | Performed by: STUDENT IN AN ORGANIZED HEALTH CARE EDUCATION/TRAINING PROGRAM

## 2024-12-06 PROCEDURE — 80197 ASSAY OF TACROLIMUS: CPT | Mod: HCNC | Performed by: INTERNAL MEDICINE

## 2024-12-06 PROCEDURE — 36415 COLL VENOUS BLD VENIPUNCTURE: CPT | Mod: HCNC,PN | Performed by: STUDENT IN AN ORGANIZED HEALTH CARE EDUCATION/TRAINING PROGRAM

## 2024-12-06 PROCEDURE — 85014 HEMATOCRIT: CPT | Mod: HCNC | Performed by: STUDENT IN AN ORGANIZED HEALTH CARE EDUCATION/TRAINING PROGRAM

## 2024-12-06 PROCEDURE — 83540 ASSAY OF IRON: CPT | Mod: HCNC | Performed by: STUDENT IN AN ORGANIZED HEALTH CARE EDUCATION/TRAINING PROGRAM

## 2024-12-06 PROCEDURE — 81001 URINALYSIS AUTO W/SCOPE: CPT | Mod: 91,HCNC | Performed by: INTERNAL MEDICINE

## 2024-12-06 PROCEDURE — 85018 HEMOGLOBIN: CPT | Mod: HCNC | Performed by: STUDENT IN AN ORGANIZED HEALTH CARE EDUCATION/TRAINING PROGRAM

## 2024-12-06 PROCEDURE — 80069 RENAL FUNCTION PANEL: CPT | Mod: HCNC | Performed by: STUDENT IN AN ORGANIZED HEALTH CARE EDUCATION/TRAINING PROGRAM

## 2024-12-06 PROCEDURE — 84156 ASSAY OF PROTEIN URINE: CPT | Mod: HCNC | Performed by: STUDENT IN AN ORGANIZED HEALTH CARE EDUCATION/TRAINING PROGRAM

## 2024-12-07 ENCOUNTER — HOSPITAL ENCOUNTER (OUTPATIENT)
Facility: HOSPITAL | Age: 69
Discharge: HOME | End: 2024-12-08
Attending: EMERGENCY MEDICINE | Admitting: STUDENT IN AN ORGANIZED HEALTH CARE EDUCATION/TRAINING PROGRAM
Payer: MEDICARE

## 2024-12-07 DIAGNOSIS — Z94.0 KIDNEY REPLACED BY TRANSPLANT: ICD-10-CM

## 2024-12-07 DIAGNOSIS — R29.898 UPPER EXTREMITY WEAKNESS: ICD-10-CM

## 2024-12-07 DIAGNOSIS — R29.818 ACUTE FOCAL NEUROLOGICAL DEFICIT: ICD-10-CM

## 2024-12-07 DIAGNOSIS — J90 PLEURAL EFFUSION: ICD-10-CM

## 2024-12-07 DIAGNOSIS — I50.32 CHRONIC DIASTOLIC CONGESTIVE HEART FAILURE: ICD-10-CM

## 2024-12-07 DIAGNOSIS — I63.9 STROKE: ICD-10-CM

## 2024-12-07 DIAGNOSIS — I63.411 EMBOLIC STROKE INVOLVING RIGHT MIDDLE CEREBRAL ARTERY: Primary | ICD-10-CM

## 2024-12-07 PROBLEM — G62.9 PERIPHERAL NEUROPATHY: Status: ACTIVE | Noted: 2024-12-07

## 2024-12-07 PROBLEM — I50.9 CHF (CONGESTIVE HEART FAILURE): Status: ACTIVE | Noted: 2024-12-07

## 2024-12-07 LAB
ALBUMIN SERPL BCP-MCNC: 2.8 G/DL (ref 3.5–5.2)
ALP SERPL-CCNC: 53 U/L (ref 40–150)
ALT SERPL W/O P-5'-P-CCNC: <5 U/L (ref 10–44)
ANION GAP SERPL CALC-SCNC: 9 MMOL/L (ref 8–16)
AST SERPL-CCNC: 15 U/L (ref 10–40)
BASOPHILS # BLD AUTO: 0.03 K/UL (ref 0–0.2)
BASOPHILS NFR BLD: 0.6 % (ref 0–1.9)
BILIRUB SERPL-MCNC: 0.3 MG/DL (ref 0.1–1)
BUN SERPL-MCNC: 35 MG/DL (ref 8–23)
CALCIUM SERPL-MCNC: 8.5 MG/DL (ref 8.7–10.5)
CHLORIDE SERPL-SCNC: 103 MMOL/L (ref 95–110)
CHOLEST SERPL-MCNC: 172 MG/DL (ref 120–199)
CHOLEST/HDLC SERPL: 5.7 {RATIO} (ref 2–5)
CO2 SERPL-SCNC: 22 MMOL/L (ref 23–29)
CREAT SERPL-MCNC: 2.4 MG/DL (ref 0.5–1.4)
DIFFERENTIAL METHOD BLD: ABNORMAL
EOSINOPHIL # BLD AUTO: 0.1 K/UL (ref 0–0.5)
EOSINOPHIL NFR BLD: 1.2 % (ref 0–8)
ERYTHROCYTE [DISTWIDTH] IN BLOOD BY AUTOMATED COUNT: 16.9 % (ref 11.5–14.5)
EST. GFR  (NO RACE VARIABLE): 28.9 ML/MIN/1.73 M^2
ESTIMATED AVG GLUCOSE: 186 MG/DL (ref 68–131)
GLUCOSE SERPL-MCNC: 159 MG/DL (ref 70–110)
HBA1C MFR BLD: 8.1 % (ref 4–5.6)
HCT VFR BLD AUTO: 35.8 % (ref 40–54)
HDLC SERPL-MCNC: 30 MG/DL (ref 40–75)
HDLC SERPL: 17.4 % (ref 20–50)
HGB BLD-MCNC: 10.4 G/DL (ref 14–18)
IMM GRANULOCYTES # BLD AUTO: 0.02 K/UL (ref 0–0.04)
IMM GRANULOCYTES NFR BLD AUTO: 0.4 % (ref 0–0.5)
INR PPP: 1.3 (ref 0.8–1.2)
LDLC SERPL CALC-MCNC: 105.4 MG/DL (ref 63–159)
LYMPHOCYTES # BLD AUTO: 0.6 K/UL (ref 1–4.8)
LYMPHOCYTES NFR BLD: 12 % (ref 18–48)
MCH RBC QN AUTO: 22.9 PG (ref 27–31)
MCHC RBC AUTO-ENTMCNC: 29.1 G/DL (ref 32–36)
MCV RBC AUTO: 79 FL (ref 82–98)
MONOCYTES # BLD AUTO: 1.1 K/UL (ref 0.3–1)
MONOCYTES NFR BLD: 21.8 % (ref 4–15)
NEUTROPHILS # BLD AUTO: 3.3 K/UL (ref 1.8–7.7)
NEUTROPHILS NFR BLD: 64 % (ref 38–73)
NONHDLC SERPL-MCNC: 142 MG/DL
NRBC BLD-RTO: 0 /100 WBC
OHS QRS DURATION: 108 MS
OHS QTC CALCULATION: 436 MS
PLATELET # BLD AUTO: 114 K/UL (ref 150–450)
PMV BLD AUTO: ABNORMAL FL (ref 9.2–12.9)
POCT GLUCOSE: 145 MG/DL (ref 70–110)
POCT GLUCOSE: 166 MG/DL (ref 70–110)
POCT GLUCOSE: 273 MG/DL (ref 70–110)
POTASSIUM SERPL-SCNC: 4.2 MMOL/L (ref 3.5–5.1)
PROT SERPL-MCNC: 5.8 G/DL (ref 6–8.4)
PROTHROMBIN TIME: 14.4 SEC (ref 9–12.5)
RBC # BLD AUTO: 4.55 M/UL (ref 4.6–6.2)
SARS-COV-2 RDRP RESP QL NAA+PROBE: NEGATIVE
SODIUM SERPL-SCNC: 134 MMOL/L (ref 136–145)
TRIGL SERPL-MCNC: 183 MG/DL (ref 30–150)
TSH SERPL DL<=0.005 MIU/L-ACNC: 1.74 UIU/ML (ref 0.4–4)
WBC # BLD AUTO: 5.18 K/UL (ref 3.9–12.7)

## 2024-12-07 PROCEDURE — 85025 COMPLETE CBC W/AUTO DIFF WBC: CPT | Mod: HCNC | Performed by: EMERGENCY MEDICINE

## 2024-12-07 PROCEDURE — 63600175 PHARM REV CODE 636 W HCPCS: Mod: HCNC

## 2024-12-07 PROCEDURE — 84443 ASSAY THYROID STIM HORMONE: CPT | Mod: HCNC | Performed by: EMERGENCY MEDICINE

## 2024-12-07 PROCEDURE — 25000003 PHARM REV CODE 250: Mod: HCNC

## 2024-12-07 PROCEDURE — 36415 COLL VENOUS BLD VENIPUNCTURE: CPT | Mod: HCNC

## 2024-12-07 PROCEDURE — 83036 HEMOGLOBIN GLYCOSYLATED A1C: CPT | Mod: HCNC

## 2024-12-07 PROCEDURE — 87635 SARS-COV-2 COVID-19 AMP PRB: CPT | Mod: HCNC | Performed by: EMERGENCY MEDICINE

## 2024-12-07 PROCEDURE — 82962 GLUCOSE BLOOD TEST: CPT | Mod: HCNC

## 2024-12-07 PROCEDURE — 80061 LIPID PANEL: CPT | Mod: HCNC | Performed by: EMERGENCY MEDICINE

## 2024-12-07 PROCEDURE — G0378 HOSPITAL OBSERVATION PER HR: HCPCS | Mod: HCNC

## 2024-12-07 PROCEDURE — 25000003 PHARM REV CODE 250: Mod: HCNC | Performed by: STUDENT IN AN ORGANIZED HEALTH CARE EDUCATION/TRAINING PROGRAM

## 2024-12-07 PROCEDURE — 99285 EMERGENCY DEPT VISIT HI MDM: CPT | Mod: 25,HCNC

## 2024-12-07 PROCEDURE — 93010 ELECTROCARDIOGRAM REPORT: CPT | Mod: HCNC,,, | Performed by: INTERNAL MEDICINE

## 2024-12-07 PROCEDURE — 99223 1ST HOSP IP/OBS HIGH 75: CPT | Mod: AI,HCNC,GC, | Performed by: STUDENT IN AN ORGANIZED HEALTH CARE EDUCATION/TRAINING PROGRAM

## 2024-12-07 PROCEDURE — 96374 THER/PROPH/DIAG INJ IV PUSH: CPT | Mod: HCNC

## 2024-12-07 PROCEDURE — 25000003 PHARM REV CODE 250: Mod: HCNC | Performed by: EMERGENCY MEDICINE

## 2024-12-07 PROCEDURE — 63600175 PHARM REV CODE 636 W HCPCS: Mod: HCNC | Performed by: STUDENT IN AN ORGANIZED HEALTH CARE EDUCATION/TRAINING PROGRAM

## 2024-12-07 PROCEDURE — 85610 PROTHROMBIN TIME: CPT | Mod: HCNC | Performed by: EMERGENCY MEDICINE

## 2024-12-07 PROCEDURE — 63600175 PHARM REV CODE 636 W HCPCS: Mod: HCNC | Performed by: EMERGENCY MEDICINE

## 2024-12-07 PROCEDURE — 96376 TX/PRO/DX INJ SAME DRUG ADON: CPT | Mod: HCNC

## 2024-12-07 PROCEDURE — 93005 ELECTROCARDIOGRAM TRACING: CPT | Mod: HCNC

## 2024-12-07 PROCEDURE — 80053 COMPREHEN METABOLIC PANEL: CPT | Mod: HCNC | Performed by: EMERGENCY MEDICINE

## 2024-12-07 RX ORDER — HYDRALAZINE HYDROCHLORIDE 25 MG/1
100 TABLET, FILM COATED ORAL EVERY 8 HOURS
Status: DISCONTINUED | OUTPATIENT
Start: 2024-12-07 | End: 2024-12-07

## 2024-12-07 RX ORDER — HYDRALAZINE HYDROCHLORIDE 25 MG/1
25 TABLET, FILM COATED ORAL EVERY 8 HOURS
Status: DISCONTINUED | OUTPATIENT
Start: 2024-12-07 | End: 2024-12-07

## 2024-12-07 RX ORDER — ATORVASTATIN CALCIUM 40 MG/1
40 TABLET, FILM COATED ORAL DAILY
Status: DISCONTINUED | OUTPATIENT
Start: 2024-12-08 | End: 2024-12-08 | Stop reason: HOSPADM

## 2024-12-07 RX ORDER — HYDRALAZINE HYDROCHLORIDE 25 MG/1
100 TABLET, FILM COATED ORAL EVERY 8 HOURS
Status: DISCONTINUED | OUTPATIENT
Start: 2024-12-07 | End: 2024-12-08 | Stop reason: HOSPADM

## 2024-12-07 RX ORDER — GLUCAGON 1 MG
1 KIT INJECTION
Status: DISCONTINUED | OUTPATIENT
Start: 2024-12-07 | End: 2024-12-08 | Stop reason: HOSPADM

## 2024-12-07 RX ORDER — VALSARTAN 160 MG/1
160 TABLET ORAL DAILY
Status: DISCONTINUED | OUTPATIENT
Start: 2024-12-07 | End: 2024-12-07

## 2024-12-07 RX ORDER — GABAPENTIN 300 MG/1
300 CAPSULE ORAL NIGHTLY
Status: DISCONTINUED | OUTPATIENT
Start: 2024-12-07 | End: 2024-12-08 | Stop reason: HOSPADM

## 2024-12-07 RX ORDER — ONDANSETRON 8 MG/1
8 TABLET, ORALLY DISINTEGRATING ORAL EVERY 8 HOURS PRN
Status: DISCONTINUED | OUTPATIENT
Start: 2024-12-07 | End: 2024-12-08 | Stop reason: HOSPADM

## 2024-12-07 RX ORDER — VALSARTAN 160 MG/1
320 TABLET ORAL DAILY
Status: DISCONTINUED | OUTPATIENT
Start: 2024-12-07 | End: 2024-12-07

## 2024-12-07 RX ORDER — LABETALOL HCL 20 MG/4 ML
10 SYRINGE (ML) INTRAVENOUS
Status: DISCONTINUED | OUTPATIENT
Start: 2024-12-07 | End: 2024-12-08 | Stop reason: HOSPADM

## 2024-12-07 RX ORDER — TACROLIMUS 0.5 MG/1
0.5 CAPSULE ORAL EVERY MORNING
Status: DISCONTINUED | OUTPATIENT
Start: 2024-12-07 | End: 2024-12-07

## 2024-12-07 RX ORDER — BISACODYL 10 MG/1
10 SUPPOSITORY RECTAL DAILY PRN
Status: DISCONTINUED | OUTPATIENT
Start: 2024-12-07 | End: 2024-12-08

## 2024-12-07 RX ORDER — NAPROXEN SODIUM 220 MG/1
324 TABLET, FILM COATED ORAL
Status: COMPLETED | OUTPATIENT
Start: 2024-12-07 | End: 2024-12-07

## 2024-12-07 RX ORDER — HEPARIN SODIUM 5000 [USP'U]/ML
5000 INJECTION, SOLUTION INTRAVENOUS; SUBCUTANEOUS EVERY 8 HOURS
Status: DISCONTINUED | OUTPATIENT
Start: 2024-12-07 | End: 2024-12-07

## 2024-12-07 RX ORDER — POLYETHYLENE GLYCOL 3350 17 G/17G
17 POWDER, FOR SOLUTION ORAL DAILY PRN
Status: DISCONTINUED | OUTPATIENT
Start: 2024-12-07 | End: 2024-12-08 | Stop reason: HOSPADM

## 2024-12-07 RX ORDER — VALSARTAN 160 MG/1
320 TABLET ORAL NIGHTLY
Status: DISCONTINUED | OUTPATIENT
Start: 2024-12-07 | End: 2024-12-08 | Stop reason: HOSPADM

## 2024-12-07 RX ORDER — SODIUM CHLORIDE 0.9 % (FLUSH) 0.9 %
10 SYRINGE (ML) INJECTION
Status: DISCONTINUED | OUTPATIENT
Start: 2024-12-07 | End: 2024-12-08 | Stop reason: HOSPADM

## 2024-12-07 RX ORDER — INSULIN ASPART 100 [IU]/ML
0-10 INJECTION, SOLUTION INTRAVENOUS; SUBCUTANEOUS EVERY 6 HOURS PRN
Status: DISCONTINUED | OUTPATIENT
Start: 2024-12-07 | End: 2024-12-08 | Stop reason: HOSPADM

## 2024-12-07 RX ORDER — CARVEDILOL 12.5 MG/1
25 TABLET ORAL 2 TIMES DAILY
Status: DISCONTINUED | OUTPATIENT
Start: 2024-12-07 | End: 2024-12-07

## 2024-12-07 RX ORDER — MYCOPHENOLATE MOFETIL 250 MG/1
500 CAPSULE ORAL 2 TIMES DAILY
Status: DISCONTINUED | OUTPATIENT
Start: 2024-12-07 | End: 2024-12-08 | Stop reason: HOSPADM

## 2024-12-07 RX ORDER — PREDNISONE 5 MG/1
5 TABLET ORAL DAILY
Status: DISCONTINUED | OUTPATIENT
Start: 2024-12-07 | End: 2024-12-08 | Stop reason: HOSPADM

## 2024-12-07 RX ORDER — TACROLIMUS 0.5 MG/1
0.5 CAPSULE ORAL EVERY MORNING
Status: DISCONTINUED | OUTPATIENT
Start: 2024-12-08 | End: 2024-12-07

## 2024-12-07 RX ORDER — LABETALOL HYDROCHLORIDE 5 MG/ML
10 INJECTION, SOLUTION INTRAVENOUS
Status: COMPLETED | OUTPATIENT
Start: 2024-12-07 | End: 2024-12-07

## 2024-12-07 RX ORDER — VALSARTAN 160 MG/1
160 TABLET ORAL 2 TIMES DAILY
Status: DISCONTINUED | OUTPATIENT
Start: 2024-12-07 | End: 2024-12-07

## 2024-12-07 RX ADMIN — HYDRALAZINE HYDROCHLORIDE 100 MG: 25 TABLET ORAL at 03:12

## 2024-12-07 RX ADMIN — RIVAROXABAN 15 MG: 15 TABLET, FILM COATED ORAL at 03:12

## 2024-12-07 RX ADMIN — LABETALOL HYDROCHLORIDE 10 MG: 5 INJECTION INTRAVENOUS at 11:12

## 2024-12-07 RX ADMIN — PREDNISONE 5 MG: 5 TABLET ORAL at 03:12

## 2024-12-07 RX ADMIN — GABAPENTIN 300 MG: 300 CAPSULE ORAL at 09:12

## 2024-12-07 RX ADMIN — ASPIRIN 81 MG CHEWABLE TABLET 324 MG: 81 TABLET CHEWABLE at 11:12

## 2024-12-07 RX ADMIN — LABETALOL HYDROCHLORIDE 10 MG: 5 INJECTION INTRAVENOUS at 01:12

## 2024-12-07 RX ADMIN — MYCOPHENOLATE MOFETIL 500 MG: 250 CAPSULE ORAL at 09:12

## 2024-12-07 RX ADMIN — VALSARTAN 320 MG: 160 TABLET, FILM COATED ORAL at 09:12

## 2024-12-07 RX ADMIN — HYDRALAZINE HYDROCHLORIDE 100 MG: 25 TABLET ORAL at 09:12

## 2024-12-07 RX ADMIN — LABETALOL HYDROCHLORIDE 10 MG: 5 INJECTION INTRAVENOUS at 09:12

## 2024-12-07 NOTE — ASSESSMENT & PLAN NOTE
Mr. Ravi Zamorano is a 69 y.o. patient admitted for LSW. History of afib on Xarelto, HTN closely managed by cardiology, DM2, CKD3 s/p kidney transplant in 2016. Initially presented with acute LSW without any falls. LKN ~11PM PTA. NIHSS 1 with minor left sided facial paralysis (at baseline), CTH negative. MRA brain, head and neck negative. MRI brain with right cortical hyperintensity on DWI. No indications of any acute intervention. Patient back at baseline.     Current etiology of symptoms suspected to be from embolic/cardioembolic in the setting of afib and LA dilation.     Plan   -Admit to Obs with VN   -Continue home xarelto   - Q4H Neuro checks, VS  - SBP goal <220  - PRN labetalol with parameters   - Na goal >135  - Maintain euvolemia  - Hgb A1c, TSH, lipid panel  - Daily CBC, CMP, Mg, Phos  - PT/OT consulted for evaluation    Antithrombotics for secondary stroke prevention: Antiplatelets: None: Restarted on home Xarelto. Loaded with  in the ED.     Statins for secondary stroke prevention and hyperlipidemia, if present:   Statins: Atorvastatin- 40 mg daily    Aggressive risk factor modification: HTN, DM, HLD, A-Fib     Rehab efforts: The patient has been evaluated by a stroke team provider and the therapy needs have been fully considered based off the presenting complaints and exam findings. The following therapy evaluations are needed: PT evaluate and treat, OT evaluate and treat, SLP evaluate and treat, PM&R evaluate for appropriate placement    Diagnostics ordered/pending: HgbA1C to assess blood glucose levels, Lipid Profile to assess cholesterol levels, TTE to assess cardiac function/status , TSH to assess thyroid function    VTE prophylaxis: None: Reason for No Pharmacological VTE Prophylaxis: Currently on anticoagulation    BP parameters: Infarct: No intervention, SBP <220

## 2024-12-07 NOTE — CONSULTS
Ravi Zamorano is a 69 y.o. male for whom KTM consult has been requested to evaluate and give opinion.     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  He has CKD stage 3 - GFR 30-59 and his baseline creatinine is between 1.7-1.9. He takes mycophenolate mofetil, prednisone and tacrolimus for maintenance . Patient presented to the ER due to new onset yesterday afternoon of left side weakness and slurred speech which are resolving.  He has h/o  Chronic diastolic heart failure  Persistent A Fib.  HTN uncontrolled  Hyperlipidemia  PAD  Type 2 DM    Hid kidney function was discussed in our transplant meeting due to elevation over baseline in the last 6 months. He received a high KDPI kidney, he has several substantial co morbid conditions and the decision was made to suggest to provide supportive care. No need for a kidney biopsy. He had a free DNA cori t which i0.22 which goes against rejection, also has a weak class @ DSA. There is no convincing data to ashtyn invasive procedure to the kidney. His kidney US was negative for HAMZAH.    KTM called today to address immunosuppression. Patient had a high level which was not accurate. However I advised the stroke team to hold prograf tonight and get a level in am. I also spoke with patient and care giver in the ER room 29.        PAST MEDICAL HISTORY:  He  has a past medical history of Anticoagulant long-term use, Anxiety (07/29/2014), Arthritis, atrial fibrillation, Bilateral diabetic retinopathy (2017), CKD (chronic kidney disease) stage 2, GFR 60-89 ml/min (12/28/2016), CKD (chronic kidney disease) stage 4, GFR 15-29 ml/min (07/29/2014), Colon polyps (2014), Depression - situational (07/29/2014), Diabetes mellitus, Diabetes type 2 since 2000 (07/29/2014), Diabetic neuropathy (07/29/2014), Encounter for blood transfusion, History of cardioversion (10/03/2019), History of hepatitis C, s/p successful Rx w/ SVR24 - 9/2017 (07/29/2014),  Hyperlipidemia (07/29/2014), Hypertension, Neuropathy, Organ transplant candidate (07/29/2014), Pancreatitis, S/P cadaveric kidney transplant 11/5/2016. ESRD secondary to HTN/DMII (11/05/2016), Stage 3a chronic kidney disease (12/28/2016), and Type 2 diabetes mellitus with stage 3a chronic kidney disease, with long-term current use of insulin (07/29/2014).    PAST SURGICAL HISTORY:  He  has a past surgical history that includes Appendectomy; Kidney transplant; Treatment of cardiac arrhythmia (N/A, 8/26/2019); Transesophageal echocardiography (N/A, 8/26/2019); Colonoscopy (N/A, 12/21/2020); Cataract extraction w/  intraocular lens implant (Right, 3/13/2024); Cataract extraction w/  intraocular lens implant (Left, 4/10/2024); Ablation of arrhythmogenic focus for atrial fibrillation (N/A, 6/11/2024); Cardioversion (6/11/2024); ablation, atrial flutter, typical (6/11/2024); Transesophageal echocardiography (N/A, 6/11/2024); and Bone marrow biopsy (Left, 6/26/2024).    SOCIAL HISTORY:  He  reports that he quit smoking about 8 years ago. His smoking use included cigarettes. He started smoking about 40 years ago. He has a 16 pack-year smoking history. He has never used smokeless tobacco. He reports current alcohol use. He reports that he does not use drugs.    FAMILY MEDICAL HISTORY:  His family history includes Cancer in his brother; Diabetes in his mother; Heart disease in his father and mother; Hypertension in his brother and mother.    Review of patient's allergies indicates:   Allergen Reactions    Nifedipine Other (See Comments)     Gingival hyperplasia        [START ON 12/8/2024] atorvastatin  40 mg Oral Daily    epoetin tanya  4,000 Units Subcutaneous Q7 Days    mycophenolate  500 mg Oral BID    predniSONE  5 mg Oral Daily    rivaroxaban  15 mg Oral Daily with dinner       Prior to Admission medications    Medication Sig Start Date End Date Taking? Authorizing Provider   aspirin 81 MG Chew Take 81 mg by mouth once  daily.    Provider, Historical   atorvastatin (LIPITOR) 40 MG tablet Take 1 tablet (40 mg total) by mouth once daily. 4/1/24   Mima Mack MD   blood sugar diagnostic Strp Use to check blood glucose 1 times daily, to use with insurance preferred meter 5/21/24   Mima Mack MD   blood-glucose meter Misc Use to check blood glucose 1 times daily, to use with insurance preferred meter 5/21/24   Mima Mack MD   blood-glucose sensor Kayla Gin 3, use as directed, change every 14 days , e 11.65 3/15/23   Karan Lopez, REYES, FNP   carvediloL (COREG) 25 MG tablet Take 1 tablet (25 mg total) by mouth 2 (two) times daily. 6/11/24 6/6/25  Lisandro Jauregui MD   doxazosin (CARDURA) 1 MG tablet Take 1 tablet (1 mg total) by mouth every evening. 10/31/24 10/31/25  Francesco Lopez MD   empagliflozin (JARDIANCE) 10 mg tablet Take 1 tablet (10 mg total) by mouth once daily. 11/27/24   Mima Mack MD   ergocalciferol (ERGOCALCIFEROL) 50,000 unit Cap Take 1 capsule (50,000 Units total) by mouth every 7 days. 11/21/24 1/21/25  Karan Lopez APRN, KEO   furosemide (LASIX) 40 MG tablet Take 2 tablets (80 mg total) by mouth 2 (two) times a day. 10/31/24 10/31/25  Francesco Lopez MD   gabapentin (NEURONTIN) 300 MG capsule Take 1 capsule by mouth in the morning, 1 capsule midday, and 3 capsules at night. 8/7/24   Mima Mack MD   glucagon (GVOKE HYPOPEN 2-PACK) 1 mg/0.2 mL AtIn Inject 0.2 mLs into the skin as needed (severe hypoglycemia). 7/23/24   Karan Lopez APRN, SALOMEP   hydrALAZINE (APRESOLINE) 100 MG tablet Take 1 tablet (100 mg total) by mouth every 8 (eight) hours. 10/8/24 10/8/25  Nayely Vital, PALuis AntonioC   influenza, adjuvanted, (FLUAD TRIV 2024-25,65Y UP,,PF,) 45 mcg (15 mcg x 3)/0.5 mL IM vaccine (> or = 64 yo) Inject into the muscle. 9/19/24   Eli Sims, JarodD   insulin aspart U-100 (NOVOLOG) 100 unit/mL (3 mL) InPn pen Inject 10 units under the  "skin w/ breakfast, 7 units at lunch and dinner. Scale 180-230+2, 231-280+4, 281-330+6, 331-380+8, >380+10.  Max daily 57 units. 11/21/24   Karan Lopez APRN, FNP   insulin glargine U-100, Lantus, (LANTUS SOLOSTAR U-100 INSULIN) 100 unit/mL (3 mL) InPn pen Inject 20 Units into the skin every morning. 3/14/24   Karan Lopez APRN, FNP   lancets 30 gauge Misc Use to check blood glucose 1 times daily, to use with insurance preferred meter 5/21/24   Mima Mack MD   magnesium oxide (MAG-OX) 400 mg (241.3 mg magnesium) tablet Take 1 tablet (400 mg total) by mouth 2 (two) times daily. 5/31/24 5/31/25  Paris Lujan PA-C   meclizine (ANTIVERT) 25 mg tablet Take 1 tablet (25 mg total) by mouth 3 (three) times daily as needed for Dizziness. 3/14/24   Karan Lopez APRN, FNP   mycophenolate (CELLCEPT) 250 mg Cap Take 2 capsules (500 mg total) by mouth 2 (two) times daily. 6/16/24 6/16/25  Quin Smith MD   pen needle, diabetic (BD ULTRA-FINE LO PEN NEEDLE) 32 gauge x 5/32" Ndle USE TO ADMINISTER INSULIN 4 TIMES DAILY. 7/19/22   Karan Lopez APRN, FNP   predniSONE (DELTASONE) 5 MG tablet Take 1 tablet (5 mg total) by mouth once daily. 12/3/24   Emilia Abreu NP   rivaroxaban (XARELTO) 15 mg Tab Take 1 tablet (15 mg total) by mouth daily with dinner or evening meal. 8/21/24 8/16/25  Mima Mack MD   tacrolimus (PROGRAF) 0.5 MG Cap Take 1 capsule (0.5 mg total) by mouth once daily. 9/19/24   Osmin Owens Jr., MD   valsartan (DIOVAN) 320 MG tablet Take 1 tablet (320 mg total) by mouth once daily. 7/10/24 7/5/25  Nayely Vital, MARK   insulin lispro (HUMALOG KWIKPEN INSULIN) 100 unit/mL pen Inject 18 units w/ breakfast, 16 units w/ lunch and dinner plus scale 150-200 +2, 201-250 +4, 251-300 +6, 301-350 +8. 1/25/22 1/2/23  Karan Lopez, APRN, FNP        REVIEW OF SYSTEMS:  Patient has no fever, fatigue, visual changes, chest pain, edema, cough, dyspnea, " "nausea, vomiting, constipation, diarrhea, arthralgias, pruritis, dizziness, weakness, depression, confusion.    No intake or output data in the 24 hours ending 12/07/24 1405    PHYSICAL EXAM:   height is 6' 1" (1.854 m) and weight is 86.2 kg (190 lb). His oral temperature is 97.9 °F (36.6 °C). His blood pressure is 210/99 (abnormal) and his pulse is 59 (abnormal). His respiration is 17 and oxygen saturation is 96%.   Gen: WDWN male in no apparent distress  Psych: Normal mood and affect  Skin: No rashes or ulcers  Eyes: Normal conjunctiva and lids, PERRLA  ENT: Normal hearing with no oropharyngeal lesions  Neck: No JVD  Chest: Clear with no rales, rhonchi, wheezing with normal effort  CV: Regular with no murmurs, gallops or rubs  Abd: Soft, nontender, no distension, positive bowel sounds  Ext: No cyanosis, clubbing or edema    Recent Labs   Lab 12/06/24  0815 12/07/24  0629   * 134*   K 4.2 4.2    103   CO2 26 22*   BUN 37* 35*   CREATININE 2.4* 2.4*   CALCIUM 8.5* 8.5*   PHOS 3.1  --      Recent Labs   Lab 12/06/24  0815 12/07/24  0629   WBC  --  5.18   HGB 10.4* 10.4*   HCT 36.4* 35.8*   PLT  --  114*       IMPRESSION AND RECOMMENDATIONS:    Ckd stage 3  Proteinuria  DM  CAD  A Fib  Encounter for management of immunosuppression after kidney transplant    Monitor creatinine/electrolytes  Tacrolimus level in am  Acute stroke per Neurovascular team  BP management per Neurovascular team. Fellow explained to me that will allo BP to get in the 200's range but after 24 hrs will start introducing meds to obtain better BP control with management in house.  Will advise his nephrologist to repeat pc ratio in a few weeks  I spoke with patient and care giver  Please call me if any questions    Bola Sosa MD  Transplant Nephrology               "

## 2024-12-07 NOTE — ED TRIAGE NOTES
"Ravi Zamorano, a 69 y.o. male presents to the ED w/ complaint of left side extremity weakness since yesterday am, "I can't  nothing". Hx of kidney transplant     Triage note:  Chief Complaint   Patient presents with    Extremity Weakness     Reports Left arm weakness since 10am yesterday morning. "I can't  nothing"   in triage. Hx of kidney transplant      Review of patient's allergies indicates:   Allergen Reactions    Nifedipine Other (See Comments)     Gingival hyperplasia     Past Medical History:   Diagnosis Date    Anticoagulant long-term use     Anxiety 07/29/2014    Arthritis     atrial fibrillation     Bilateral diabetic retinopathy 2017    CKD (chronic kidney disease) stage 2, GFR 60-89 ml/min 12/28/2016    CKD (chronic kidney disease) stage 4, GFR 15-29 ml/min 07/29/2014    Colon polyps 2014    Depression - situational 07/29/2014    Diabetes mellitus     Diabetes type 2 since 2000 07/29/2014    Diabetic neuropathy 07/29/2014    Encounter for blood transfusion     History of cardioversion 10/03/2019    History of hepatitis C, s/p successful Rx w/ SVR24 - 9/2017 07/29/2014    Genotype 1a, treatment naive 10/2014 liver biopsy - grade 1 / stage 1 Completed Harvoni w/ SVR    Hyperlipidemia 07/29/2014    Hypertension     Neuropathy     Organ transplant candidate 07/29/2014    Pancreatitis     S/P cadaveric kidney transplant 11/5/2016. ESRD secondary to HTN/DMII 11/05/2016    Stage 3a chronic kidney disease 12/28/2016    Type 2 diabetes mellitus with stage 3a chronic kidney disease, with long-term current use of insulin 07/29/2014     "

## 2024-12-07 NOTE — ED PROVIDER NOTES
"Encounter Date: 12/7/2024       History     Chief Complaint   Patient presents with    Extremity Weakness     Reports Left arm weakness since 10am yesterday morning. "I can't  nothing"   in triage. Hx of kidney transplant      Pt is a 68 yo M with PMH of atrial fibrillation on AC, DM, Hep C, CKD who presents with L arm weakness that started suddenly at 10AM when he woke up, went to bed about 11pm last night. Patient reporting heaviness to L side.       Review of patient's allergies indicates:   Allergen Reactions    Nifedipine Other (See Comments)     Gingival hyperplasia     Past Medical History:   Diagnosis Date    Anticoagulant long-term use     Anxiety 07/29/2014    Arthritis     atrial fibrillation     Bilateral diabetic retinopathy 2017    Cardioembolic stroke 12/7/2024    CKD (chronic kidney disease) stage 2, GFR 60-89 ml/min 12/28/2016    CKD (chronic kidney disease) stage 4, GFR 15-29 ml/min 07/29/2014    Colon polyps 2014    Depression - situational 07/29/2014    Diabetes mellitus     Diabetes type 2 since 2000 07/29/2014    Diabetic neuropathy 07/29/2014    Encounter for blood transfusion     History of cardioversion 10/03/2019    History of hepatitis C, s/p successful Rx w/ SVR24 - 9/2017 07/29/2014    Genotype 1a, treatment naive 10/2014 liver biopsy - grade 1 / stage 1 Completed Harvoni w/ SVR    Hyperlipidemia 07/29/2014    Hypertension     Neuropathy     Organ transplant candidate 07/29/2014    Pancreatitis     S/P cadaveric kidney transplant 11/5/2016. ESRD secondary to HTN/DMII 11/05/2016    Stage 3a chronic kidney disease 12/28/2016    Type 2 diabetes mellitus with stage 3a chronic kidney disease, with long-term current use of insulin 07/29/2014     Past Surgical History:   Procedure Laterality Date    ABLATION OF ARRHYTHMOGENIC FOCUS FOR ATRIAL FIBRILLATION N/A 6/11/2024    Procedure: Ablation atrial fibrillation;  Surgeon: Bronson Bowden MD;  Location: [facility] LAB;  Service: " Cardiology;  Laterality: N/A;  AF, FELICIANO (cx if SR), PVI, RFA, Carto, Gen, GP, 3 Prep    ABLATION, ATRIAL FLUTTER, TYPICAL  6/11/2024    Procedure: Ablation, Atrial Flutter, Typical;  Surgeon: Bronson Bowden MD;  Location: [facility] EP LAB;  Service: Cardiology;;    APPENDECTOMY      BONE MARROW BIOPSY Left 6/26/2024    Procedure: Biopsy-bone marrow;  Surgeon: Bridger Zapata MD;  Location: [facility] ENDO (4TH FLR);  Service: Oncology;  Laterality: Left;  6/24-pt knows to hold aspirin and eliquis as instructed by hem/onc, precall complete-Kpvt    CARDIOVERSION  6/11/2024    Procedure: Cardioversion;  Surgeon: Bronson Bowden MD;  Location: [facility] EP LAB;  Service: Cardiology;;    CATARACT EXTRACTION W/  INTRAOCULAR LENS IMPLANT Right 3/13/2024    Procedure: EXTRACTION, CATARACT, WITH IOL INSERTION;  Surgeon: Jennifer Palacio MD;  Location: [facility] OR;  Service: Ophthalmology;  Laterality: Right;    CATARACT EXTRACTION W/  INTRAOCULAR LENS IMPLANT Left 4/10/2024    Procedure: EXTRACTION, CATARACT, WITH IOL INSERTION;  Surgeon: Jennifer Palacio MD;  Location: [facility] OR;  Service: Ophthalmology;  Laterality: Left;    COLONOSCOPY N/A 12/21/2020    Procedure: COLONOSCOPY;  Surgeon: Tico Bell MD;  Location: [facility] ENDO (4TH FLR);  Service: Endoscopy;  Laterality: N/A;  ok to hold eliquis per Dr. Valadez, see telephone encounter 11/13/2020-MS  covid test 12/18 [place]    KIDNEY TRANSPLANT      TRANSESOPHAGEAL ECHOCARDIOGRAPHY N/A 8/26/2019    Procedure: ECHOCARDIOGRAM, TRANSESOPHAGEAL;  Surgeon: Dolores Diagnostic Provider;  Location: [facility] EP LAB;  Service: Cardiology;  Laterality: N/A;    TRANSESOPHAGEAL ECHOCARDIOGRAPHY N/A 6/11/2024    Procedure: ECHOCARDIOGRAM, TRANSESOPHAGEAL;  Surgeon: Grayson Lin III, MD;  Location: [facility] EP LAB;  Service: Cardiology;  Laterality: N/A;    TREATMENT OF CARDIAC ARRHYTHMIA N/A 8/26/2019    Procedure: CARDIOVERSION;  Surgeon: Bronson Bowden MD;  Location: [facility] EP LAB;  Service:  Cardiology;  Laterality: N/A;  AF, FELICIANO, DCCV, MAC, GP, 3 PREP     Family History   Problem Relation Name Age of Onset    Diabetes Mother      Hypertension Mother      Heart disease Mother          CAD with PCI    Heart disease Father      Cancer Brother 2         one sister with breast cancer    Hypertension Brother 2         one sister with HTN and borderline DM    Stroke Neg Hx      Kidney disease Neg Hx      Colon cancer Neg Hx      Esophageal cancer Neg Hx       Social History     Tobacco Use    Smoking status: Former     Current packs/day: 0.00     Average packs/day: 0.5 packs/day for 32.0 years (16.0 ttl pk-yrs)     Types: Cigarettes     Start date: 1984     Quit date: 2016     Years since quittin.0    Smokeless tobacco: Never    Tobacco comments:     Pt reports that he quit in , but started up again in . pt reports he is currently working on quitting again   Substance Use Topics    Alcohol use: Yes     Comment: Pt reports occasional beers on Sundays. Pt reports drinking daily prior to ESRD diagnosis.    Drug use: No       Physical Exam     Initial Vitals [24 0525]   BP Pulse Resp Temp SpO2   (!) 206/96 65 16 97.9 °F (36.6 °C) 96 %      MAP       --         Physical Exam    Nursing note and vitals reviewed.  Constitutional: He appears well-developed and well-nourished. He is not diaphoretic. No distress.   HENT:   Head: Normocephalic and atraumatic.   Eyes: EOM are normal.   Neck: Neck supple.   Normal range of motion.  Cardiovascular:  Normal rate and regular rhythm.           Pulmonary/Chest: No respiratory distress.   Abdominal: He exhibits no distension.   Musculoskeletal:         General: Normal range of motion.      Cervical back: Normal range of motion and neck supple.     Neurological: He is alert and oriented to person, place, and time. GCS score is 15. GCS eye subscore is 4. GCS verbal subscore is 5. GCS motor subscore is 6.   Skin: Skin is warm and dry.   Psychiatric:  He has a normal mood and affect. His behavior is normal. Judgment and thought content normal.         ED Course   Procedures  Labs Reviewed   COMPREHENSIVE METABOLIC PANEL - Abnormal       Result Value    Sodium 134 (*)     Potassium 4.2      Chloride 103      CO2 22 (*)     Glucose 159 (*)     BUN 35 (*)     Creatinine 2.4 (*)     Calcium 8.5 (*)     Total Protein 5.8 (*)     Albumin 2.8 (*)     Total Bilirubin 0.3      Alkaline Phosphatase 53      AST 15      ALT <5 (*)     eGFR 28.9 (*)     Anion Gap 9     CBC W/ AUTO DIFFERENTIAL - Abnormal    WBC 5.18      RBC 4.55 (*)     Hemoglobin 10.4 (*)     Hematocrit 35.8 (*)     MCV 79 (*)     MCH 22.9 (*)     MCHC 29.1 (*)     RDW 16.9 (*)     Platelets 114 (*)     MPV SEE COMMENT      Immature Granulocytes 0.4      Gran # (ANC) 3.3      Immature Grans (Abs) 0.02      Lymph # 0.6 (*)     Mono # 1.1 (*)     Eos # 0.1      Baso # 0.03      nRBC 0      Gran % 64.0      Lymph % 12.0 (*)     Mono % 21.8 (*)     Eosinophil % 1.2      Basophil % 0.6      Differential Method Automated     PROTIME-INR - Abnormal    Prothrombin Time 14.4 (*)     INR 1.3 (*)    LIPID PANEL - Abnormal    Cholesterol 172      Triglycerides 183 (*)     HDL 30 (*)     LDL Cholesterol 105.4      HDL/Cholesterol Ratio 17.4 (*)     Total Cholesterol/HDL Ratio 5.7 (*)     Non-HDL Cholesterol 142     POCT GLUCOSE - Abnormal    POCT Glucose 166 (*)    TSH    TSH 1.738     SARS-COV-2 RNA AMPLIFICATION, QUAL    SARS-CoV-2 RNA, Amplification, Qual Negative       EKG Readings: (Independently Interpreted)   Initial Reading: No STEMI. Rhythm: Normal Sinus Rhythm. Heart Rate: 62. Ectopy: PACs.     ECG Results              EKG 12-lead (Final result)        Collection Time Result Time QRS Duration OHS QTC Calculation    12/07/24 05:33:35 12/07/24 08:56:57 108 436                     Final result by Interface, Lab In [facility] (12/07/24 08:57:00)                   Narrative:    Test Reason : R29.898,    Vent. Rate :   62 BPM     Atrial Rate :    BPM     P-R Int :    ms          QRS Dur : 108 ms      QT Int : 430 ms       P-R-T Axes :     92  36 degrees    QTcB Int : 436 ms    NSR with PACs  Possibly Lateral infarct ,age undetermined vs IVCD  Abnormal ECG  When compared with ECG of 08-Oct-2024 10:30,  Lateral infarct is now possibly  ST now depressed in Lateral leads  Nonspecific T wave abnormality no longer evident in Inferior leads  T wave inversion now evident in Lateral leads  Confirmed by Tejas Carter ([id number]) on 12/7/2024 8:56:54 AM    Referred By: AAAREFERRAL SELF           Confirmed By: Tejas Carter                                  Imaging Results              US Upper Extremity Veins Left (Final result)  Result time 12/07/24 12:55:17      Final result by Keon Parker MD (12/07/24 12:55:17)                   Impression:      No thrombus in central veins of the left upper extremity.      Electronically signed by: Keon Parker MD  Date:    12/07/2024  Time:    12:55               Narrative:    EXAMINATION:  US UPPER EXTREMITY VEINS LEFT    CLINICAL HISTORY:  L arm swelling, concern for DVT;    TECHNIQUE:  Duplex and color flow Doppler evaluation and dynamic compression was performed of the left upper extremity veins.    COMPARISON:  None    FINDINGS:  Central veins: The internal jugular, subclavian, and axillary veins are patent and free of thrombus.    Arm veins: The brachial, basilic, and cephalic veins are patent and compressible.    Contralateral subclavian/internal jugular veins: The right subclavian and internal jugular veins are patent and free of thrombus.                                       MRA Brain without contrast (Final result)  Result time 12/07/24 10:13:18      Final result by Gabriel Diallo MD (12/07/24 10:13:18)                   Impression:    FINDINGS/  MRA neck:    There is no significant stenosis at the carotid bifurcations by NASCET criteria.  The vertebral arteries are patent bilaterally and  are Co dominant.    MRA head:    There is no major branch advanced stenosis/occlusion at the [id number].    No evidence of aneurysm or AVM.      Electronically signed by: Gabriel Diallo  Date:    12/07/2024  Time:    10:13               Narrative:    EXAMINATION:  MRA BRAIN WITHOUT CONTRAST; MRA NECK WITHOUT CONTRAST    CLINICAL HISTORY:  Transient ischemic attack (TIA);Stroke, follow up;    TECHNIQUE:  Non-contrast 3-D time-of-flight intracranial MR angiography was performed through the [facility] [place] with MIP reformatting.    COMPARISON:  None                                       MRA Neck without contrast (Final result)  Result time 12/07/24 10:13:18      Final result by Gabriel Diallo MD (12/07/24 10:13:18)                   Impression:    FINDINGS/  MRA neck:    There is no significant stenosis at the carotid bifurcations by NASCET criteria.  The vertebral arteries are patent bilaterally and are Co dominant.    MRA head:    There is no major branch advanced stenosis/occlusion at the [id number].    No evidence of aneurysm or AVM.      Electronically signed by: Gabriel Diallo  Date:    12/07/2024  Time:    10:13               Narrative:    EXAMINATION:  MRA BRAIN WITHOUT CONTRAST; MRA NECK WITHOUT CONTRAST    CLINICAL HISTORY:  Transient ischemic attack (TIA);Stroke, follow up;    TECHNIQUE:  Non-contrast 3-D time-of-flight intracranial MR angiography was performed through the [facility] [facility] [place] with MIP reformatting.    COMPARISON:  None                                       MRI Brain Without Contrast (Final result)  Result time 12/07/24 10:09:31      Final result by Gabriel Diallo MD (12/07/24 10:09:31)                   Impression:      Subcentimeter acute infarct subcortical white matter right frontal lobe.  No intracranial hemorrhage or mass effect.      Electronically signed by: Gabriel Diallo  Date:    12/07/2024  Time:    10:09               Narrative:    EXAMINATION:  MRI BRAIN  WITHOUT CONTRAST    CLINICAL HISTORY:  Transient ischemic attack (TIA);Stroke, follow up;    TECHNIQUE:  Multiplanar multisequence MR imaging of the brain was performed without contrast.    COMPARISON:  CT 12-7-2024    FINDINGS:  Element of volume loss.    There is a 8 mm focus of restricted diffusion within the white matter of the right frontal lobe subcortically consistent with an acute infarct.  No other acute infarct is identified within the remainder of the brain parenchyma.    No hydrocephalus mass effect or intracranial hemorrhage.    Increased signal within the periventricular and subcortical white matter is nonspecific but may reflect mild approaching moderate chronic small vessel ischemic change.  Chronic right thalamic lacunar infarct.    Punctate chronic hemosiderin staining left frontal subcortical white matter.    Minimal right maxillary mucosal thickening.  The mastoid air cells are essentially clear.                                       X-Ray Chest AP Portable (Final result)  Result time 12/07/24 08:22:31      Final result by Ashly Orr MD (12/07/24 08:22:31)                   Impression:      Possible right basilar airspace disease versus artifact.  If real, this would be concerning for pneumonia.  Clinical correlation.  Repeat PA and lateral chest radiographs at full inspiration could be considered for further evaluation.      Electronically signed by: Ashly Orr  Date:    12/07/2024  Time:    08:22               Narrative:    EXAMINATION:  XR CHEST AP PORTABLE    CLINICAL HISTORY:  Pleural effusion, not elsewhere classified    TECHNIQUE:  Single frontal view of the chest was performed.    COMPARISON:  06/15/2024    FINDINGS:  Ill-defined airspace opacity is present in the right lung base, new when compared to 06/15/2024.  Alternatively, this could be secondary to overlapping soft tissue structures.  The lungs are otherwise clear.  No pleural effusion.  Heart size is unchanged.                                        CT Head Without Contrast (Final result)  Result time 12/07/24 07:19:17      Final result by Ashly Orr MD (12/07/24 07:19:17)                   Impression:      1. No acute intracranial abnormality.  2. No fracture or subluxation of the cervical spine.  3. Bilateral pleural effusions.      Electronically signed by: Ashly Orr  Date:    12/07/2024  Time:    07:19               Narrative:    EXAMINATION:  CT HEAD WITHOUT CONTRAST; CT CERVICAL SPINE WITHOUT CONTRAST    CLINICAL HISTORY:  Neuro deficit, acute, stroke suspected;; Cervical radiculopathy, no red flags;    TECHNIQUE:  Low dose axial images were obtained through the head.  Coronal and sagittal reformations were also performed. Contrast was not administered.    COMPARISON:  None.    FINDINGS:  Head:    Blood: No acute intracranial hemorrhage.    Parenchyma: No definite loss of gray-white differentiation to suggest acute or subacute transcortical infarct. There is supratentorial primarily periventricular hypoattenuation, nonspecific.    Ventricles/Extra-axial spaces: No abnormal extra-axial fluid collection. Basal cisterns are patent.    Vessels: Severe intracranial vascular calcifications    Orbits: Unremarkable.    Scalp: Unremarkable.    Skull: There are no depressed skull fractures or destructive bone lesions.    Sinuses and mastoids: Essentially clear.    Other findings: None    Cervical spine:    Fractures: No acute fractures    Alignment: There is no significant vertebral subluxation  Atlanto-axial and atlanto-occipital joints: Atlanto-axial and atlanto-occipital intervals are not widened.  Facet joints: There is no traumatic facet joint widening.  Vertebral bodies: Normal  Discs:  Spinal canal and foraminal narrowing: Although CT does not optimally evaluate the soft tissue contents of the spinal canal and foramina, no critical stenosis is suggested.  Paraspinal soft tissues: Unremarkable.    Upper  Lungs:Bilateral pleural effusions are present.                                       CT Cervical Spine Without Contrast (Final result)  Result time 12/07/24 07:19:17      Final result by Ashly Orr MD (12/07/24 07:19:17)                   Impression:      1. No acute intracranial abnormality.  2. No fracture or subluxation of the cervical spine.  3. Bilateral pleural effusions.      Electronically signed by: Ashly Orr  Date:    12/07/2024  Time:    07:19               Narrative:    EXAMINATION:  CT HEAD WITHOUT CONTRAST; CT CERVICAL SPINE WITHOUT CONTRAST    CLINICAL HISTORY:  Neuro deficit, acute, stroke suspected;; Cervical radiculopathy, no red flags;    TECHNIQUE:  Low dose axial images were obtained through the head.  Coronal and sagittal reformations were also performed. Contrast was not administered.    COMPARISON:  None.    FINDINGS:  Head:    Blood: No acute intracranial hemorrhage.    Parenchyma: No definite loss of gray-white differentiation to suggest acute or subacute transcortical infarct. There is supratentorial primarily periventricular hypoattenuation, nonspecific.    Ventricles/Extra-axial spaces: No abnormal extra-axial fluid collection. Basal cisterns are patent.    Vessels: Severe intracranial vascular calcifications    Orbits: Unremarkable.    Scalp: Unremarkable.    Skull: There are no depressed skull fractures or destructive bone lesions.    Sinuses and mastoids: Essentially clear.    Other findings: None    Cervical spine:    Fractures: No acute fractures    Alignment: There is no significant vertebral subluxation  Atlanto-axial and atlanto-occipital joints: Atlanto-axial and atlanto-occipital intervals are not widened.  Facet joints: There is no traumatic facet joint widening.  Vertebral bodies: Normal  Discs:  Spinal canal and foraminal narrowing: Although CT does not optimally evaluate the soft tissue contents of the spinal canal and foramina, no critical stenosis is  suggested.  Paraspinal soft tissues: Unremarkable.    Upper Lungs:Bilateral pleural effusions are present.                                       Medications   labetaloL injection 10 mg (10 mg Intravenous Given 12/7/24 0935)   labetaloL injection 10 mg (10 mg Intravenous Given 12/7/24 1146)   aspirin chewable tablet 324 mg (324 mg Oral Given 12/7/24 1149)     Medical Decision Making  DDX: stroke, cervical stenosis, myelopathy, radiculopathy  Code stroke initiated upon arrival  CTA avoided bc of CKD of transplanted kidney    Patient out of window for tpa and on AC  MRI with acute CVA, patient admitted to vascular neurology              Attending Attestation:         Attending Critical Care:   Critical Care Times:   ==============================================================  Total Critical Care Time - exclusive of procedural time: 35 minutes.  ==============================================================  Critical care was necessary to treat or prevent imminent or life-threatening deterioration of the following conditions: stroke.   Critical care was time spent personally by me on the following activities: obtaining history from patient or relative, examination of patient, review of old charts, ordering lab, x-rays, and/or EKG, development of treatment plan with patient or relative, ordering and performing treatments and interventions, evaluation of patient's response to treatment, discussion with consultants and re-evaluation of patient's conition.   Critical Care Condition: potentially life-threatening                                  Clinical Impression:  Final diagnoses:  [R29.898] Upper extremity weakness  [R29.818] Acute focal neurological deficit  [J90] Pleural effusion          ED Disposition Condition    Observation

## 2024-12-07 NOTE — ASSESSMENT & PLAN NOTE
Hypertensive in the ED with SBP on the 220s. Received labetalol 10 x 2 while in the ED.   will allow BP to get in the 200's range but after 24 hrs will start introducing meds to obtain better BP control with management in house.     -Resumed home valsartan 320   -resumed home hydralazine 100mg PO Q8H   -PRN labetalol with parameters  SBP < 220

## 2024-12-07 NOTE — HPI
Mr. Ravi Zamorano is a 69 y.o. patient presenting to the ED with LSW. History of CHF, HTN, HLD, CKD3 status post kidney transplant in 2016, PAD, afib on xarelto (~1 year) was on eliquis before but switched due to insurance unable to pay. Presented with LSW. LKN 11PM 12/6/24. Woke up around 10AM with LSW (UE > LE) was unable to have full  on his left hand. According to patient he took a nap and then woke up 30 minutes later with LSW. Wife at bedside complementing history.     SBP 220s. Received Labetalol x 1 in the ED and loaded with  once in the ED.

## 2024-12-07 NOTE — ASSESSMENT & PLAN NOTE
S/p kidney transplant on 2016.     -resumed home micophenolate   -continue home prednisone   -KTM consulted and recommending holing tacrolimus (Prograf) tonight and get levels in the morning

## 2024-12-08 VITALS
TEMPERATURE: 98 F | BODY MASS INDEX: 25.18 KG/M2 | DIASTOLIC BLOOD PRESSURE: 80 MMHG | SYSTOLIC BLOOD PRESSURE: 186 MMHG | HEIGHT: 73 IN | HEART RATE: 69 BPM | RESPIRATION RATE: 16 BRPM | WEIGHT: 190 LBS | OXYGEN SATURATION: 97 %

## 2024-12-08 PROBLEM — I63.411 EMBOLIC STROKE INVOLVING RIGHT MIDDLE CEREBRAL ARTERY: Status: ACTIVE | Noted: 2024-12-07

## 2024-12-08 LAB
ALBUMIN SERPL BCP-MCNC: 2.5 G/DL (ref 3.5–5.2)
ALP SERPL-CCNC: 51 U/L (ref 40–150)
ALT SERPL W/O P-5'-P-CCNC: <5 U/L (ref 10–44)
ANION GAP SERPL CALC-SCNC: 11 MMOL/L (ref 8–16)
ASCENDING AORTA: 3.65 CM
AST SERPL-CCNC: 9 U/L (ref 10–40)
AV AREA BY CONTINUOUS VTI: 3.4 CM2
AV INDEX (PROSTH): 0.9
AV LVOT MEAN GRADIENT: 2 MMHG
AV LVOT PEAK GRADIENT: 3 MMHG
AV MEAN GRADIENT: 2.6 MMHG
AV PEAK GRADIENT: 4.8 MMHG
AV REGURGITATION PRESSURE HALF TIME: 970.58 MS
AV VALVE AREA BY VELOCITY RATIO: 3.1 CM²
AV VALVE AREA: 3.4 CM2
AV VELOCITY RATIO: 0.82
BASOPHILS # BLD AUTO: 0.04 K/UL (ref 0–0.2)
BASOPHILS NFR BLD: 0.7 % (ref 0–1.9)
BILIRUB SERPL-MCNC: 0.3 MG/DL (ref 0.1–1)
BSA FOR ECHO PROCEDURE: 2.11 M2
BUN SERPL-MCNC: 31 MG/DL (ref 8–23)
CALCIUM SERPL-MCNC: 8.4 MG/DL (ref 8.7–10.5)
CHLORIDE SERPL-SCNC: 103 MMOL/L (ref 95–110)
CO2 SERPL-SCNC: 22 MMOL/L (ref 23–29)
CREAT SERPL-MCNC: 2.2 MG/DL (ref 0.5–1.4)
CV ECHO LV RWT: 0.36 CM
DIFFERENTIAL METHOD BLD: ABNORMAL
DOP CALC AO PEAK VEL: 1.1 M/S
DOP CALC AO VTI: 20.3 CM
DOP CALC LVOT AREA: 3.8 CM2
DOP CALC LVOT DIAMETER: 2.2 CM
DOP CALC LVOT PEAK VEL: 0.9 M/S
DOP CALC LVOT STROKE VOLUME: 69.1 CM3
DOP CALCLVOT PEAK VEL VTI: 18.2 CM
E WAVE DECELERATION TIME: 98.85 MS
E/A RATIO: 2.79
E/E' RATIO: 14.15 M/S
ECHO EF ESTIMATED: 52 %
ECHO LV POSTERIOR WALL: 1 CM (ref 0.6–1.1)
EOSINOPHIL # BLD AUTO: 0 K/UL (ref 0–0.5)
EOSINOPHIL NFR BLD: 0.7 % (ref 0–8)
ERYTHROCYTE [DISTWIDTH] IN BLOOD BY AUTOMATED COUNT: 16.8 % (ref 11.5–14.5)
EST. GFR  (NO RACE VARIABLE): 32 ML/MIN/1.73 M^2
FRACTIONAL SHORTENING: 26.8 % (ref 28–44)
GLUCOSE SERPL-MCNC: 186 MG/DL (ref 70–110)
HCT VFR BLD AUTO: 36.1 % (ref 40–54)
HGB BLD-MCNC: 10.5 G/DL (ref 14–18)
IMM GRANULOCYTES # BLD AUTO: 0.02 K/UL (ref 0–0.04)
IMM GRANULOCYTES NFR BLD AUTO: 0.4 % (ref 0–0.5)
INTERVENTRICULAR SEPTUM: 1 CM (ref 0.6–1.1)
IVRT: 108.47 MS
LA MAJOR: 6.65 CM
LA MINOR: 6.74 CM
LA WIDTH: 4.41 CM
LEFT ATRIUM SIZE: 4.12 CM
LEFT ATRIUM VOLUME INDEX MOD: 36.3 ML/M2
LEFT ATRIUM VOLUME INDEX: 49 ML/M2
LEFT ATRIUM VOLUME MOD: 76.6 ML
LEFT ATRIUM VOLUME: 103.39 CM3
LEFT INTERNAL DIMENSION IN SYSTOLE: 4.1 CM (ref 2.1–4)
LEFT VENTRICLE DIASTOLIC VOLUME INDEX: 72.6 ML/M2
LEFT VENTRICLE DIASTOLIC VOLUME: 153.19 ML
LEFT VENTRICLE MASS INDEX: 104.1 G/M2
LEFT VENTRICLE SYSTOLIC VOLUME INDEX: 34.7 ML/M2
LEFT VENTRICLE SYSTOLIC VOLUME: 73.31 ML
LEFT VENTRICULAR INTERNAL DIMENSION IN DIASTOLE: 5.6 CM (ref 3.5–6)
LEFT VENTRICULAR MASS: 219.7 G
LV LATERAL E/E' RATIO: 11.5
LV SEPTAL E/E' RATIO: 18.4
LYMPHOCYTES # BLD AUTO: 0.6 K/UL (ref 1–4.8)
LYMPHOCYTES NFR BLD: 11.6 % (ref 18–48)
MAGNESIUM SERPL-MCNC: 2.2 MG/DL (ref 1.6–2.6)
MCH RBC QN AUTO: 22.8 PG (ref 27–31)
MCHC RBC AUTO-ENTMCNC: 29.1 G/DL (ref 32–36)
MCV RBC AUTO: 78 FL (ref 82–98)
MONOCYTES # BLD AUTO: 1.1 K/UL (ref 0.3–1)
MONOCYTES NFR BLD: 19.7 % (ref 4–15)
MV PEAK A VEL: 0.33 M/S
MV PEAK E VEL: 0.92 M/S
NEUTROPHILS # BLD AUTO: 3.6 K/UL (ref 1.8–7.7)
NEUTROPHILS NFR BLD: 66.9 % (ref 38–73)
NRBC BLD-RTO: 0 /100 WBC
OHS CV RV/LV RATIO: 0.64 CM
PHOSPHATE SERPL-MCNC: 3.1 MG/DL (ref 2.7–4.5)
PISA TR MAX VEL: 4.1 M/S
PLATELET # BLD AUTO: 120 K/UL (ref 150–450)
PMV BLD AUTO: ABNORMAL FL (ref 9.2–12.9)
POCT GLUCOSE: 170 MG/DL (ref 70–110)
POCT GLUCOSE: 212 MG/DL (ref 70–110)
POCT GLUCOSE: 216 MG/DL (ref 70–110)
POTASSIUM SERPL-SCNC: 4.5 MMOL/L (ref 3.5–5.1)
PROT SERPL-MCNC: 5 G/DL (ref 6–8.4)
RA MAJOR: 5.08 CM
RA PRESSURE ESTIMATED: 3 MMHG
RA WIDTH: 4.47 CM
RBC # BLD AUTO: 4.61 M/UL (ref 4.6–6.2)
RIGHT VENTRICLE DIASTOLIC BASEL DIMENSION: 3.6 CM
RV TB RVSP: 7 MMHG
RV TISSUE DOPPLER FREE WALL SYSTOLIC VELOCITY 1 (APICAL 4 CHAMBER VIEW): 12.15 CM/S
SINUS: 3.57 CM
SODIUM SERPL-SCNC: 136 MMOL/L (ref 136–145)
STJ: 2.85 CM
TACROLIMUS BLD-MCNC: 12.1 NG/ML (ref 5–15)
TDI LATERAL: 0.08 M/S
TDI SEPTAL: 0.05 M/S
TDI: 0.07 M/S
TR MAX PG: 67 MMHG
TRICUSPID ANNULAR PLANE SYSTOLIC EXCURSION: 1.69 CM
TV PEAK GRADIENT: 71 MMHG
TV REST PULMONARY ARTERY PRESSURE: 70 MMHG
WBC # BLD AUTO: 5.44 K/UL (ref 3.9–12.7)
Z-SCORE OF LEFT VENTRICULAR DIMENSION IN END DIASTOLE: -1.66
Z-SCORE OF LEFT VENTRICULAR DIMENSION IN END SYSTOLE: 0.12

## 2024-12-08 PROCEDURE — 92610 EVALUATE SWALLOWING FUNCTION: CPT | Mod: HCNC

## 2024-12-08 PROCEDURE — 97165 OT EVAL LOW COMPLEX 30 MIN: CPT | Mod: HCNC

## 2024-12-08 PROCEDURE — 80053 COMPREHEN METABOLIC PANEL: CPT | Mod: HCNC

## 2024-12-08 PROCEDURE — 63600175 PHARM REV CODE 636 W HCPCS: Mod: HCNC | Performed by: STUDENT IN AN ORGANIZED HEALTH CARE EDUCATION/TRAINING PROGRAM

## 2024-12-08 PROCEDURE — 25000003 PHARM REV CODE 250: Mod: HCNC | Performed by: STUDENT IN AN ORGANIZED HEALTH CARE EDUCATION/TRAINING PROGRAM

## 2024-12-08 PROCEDURE — 36415 COLL VENOUS BLD VENIPUNCTURE: CPT | Mod: HCNC

## 2024-12-08 PROCEDURE — 96372 THER/PROPH/DIAG INJ SC/IM: CPT | Performed by: STUDENT IN AN ORGANIZED HEALTH CARE EDUCATION/TRAINING PROGRAM

## 2024-12-08 PROCEDURE — 83735 ASSAY OF MAGNESIUM: CPT | Mod: HCNC

## 2024-12-08 PROCEDURE — 80197 ASSAY OF TACROLIMUS: CPT | Mod: HCNC | Performed by: STUDENT IN AN ORGANIZED HEALTH CARE EDUCATION/TRAINING PROGRAM

## 2024-12-08 PROCEDURE — 84100 ASSAY OF PHOSPHORUS: CPT | Mod: HCNC

## 2024-12-08 PROCEDURE — 85025 COMPLETE CBC W/AUTO DIFF WBC: CPT | Mod: HCNC

## 2024-12-08 PROCEDURE — 97530 THERAPEUTIC ACTIVITIES: CPT | Mod: HCNC

## 2024-12-08 PROCEDURE — 99233 SBSQ HOSP IP/OBS HIGH 50: CPT | Mod: HCNC,GC,, | Performed by: STUDENT IN AN ORGANIZED HEALTH CARE EDUCATION/TRAINING PROGRAM

## 2024-12-08 PROCEDURE — 97161 PT EVAL LOW COMPLEX 20 MIN: CPT | Mod: HCNC

## 2024-12-08 PROCEDURE — 25000003 PHARM REV CODE 250: Mod: HCNC

## 2024-12-08 PROCEDURE — G0378 HOSPITAL OBSERVATION PER HR: HCPCS | Mod: HCNC

## 2024-12-08 RX ORDER — CARVEDILOL 25 MG/1
25 TABLET ORAL 2 TIMES DAILY
Status: DISCONTINUED | OUTPATIENT
Start: 2024-12-08 | End: 2024-12-08 | Stop reason: HOSPADM

## 2024-12-08 RX ORDER — TACROLIMUS 0.5 MG/1
0.5 CAPSULE ORAL DAILY
Qty: 30 CAPSULE | Refills: 11 | Status: SHIPPED | OUTPATIENT
Start: 2024-12-08

## 2024-12-08 RX ORDER — BISACODYL 10 MG/1
10 SUPPOSITORY RECTAL DAILY PRN
Status: DISCONTINUED | OUTPATIENT
Start: 2024-12-09 | End: 2024-12-08 | Stop reason: HOSPADM

## 2024-12-08 RX ORDER — FUROSEMIDE 40 MG/1
80 TABLET ORAL 2 TIMES DAILY
Status: DISCONTINUED | OUTPATIENT
Start: 2024-12-08 | End: 2024-12-08 | Stop reason: HOSPADM

## 2024-12-08 RX ADMIN — INSULIN ASPART 3 UNITS: 100 INJECTION, SOLUTION INTRAVENOUS; SUBCUTANEOUS at 12:12

## 2024-12-08 RX ADMIN — INSULIN ASPART 4 UNITS: 100 INJECTION, SOLUTION INTRAVENOUS; SUBCUTANEOUS at 06:12

## 2024-12-08 RX ADMIN — HYDRALAZINE HYDROCHLORIDE 100 MG: 25 TABLET ORAL at 01:12

## 2024-12-08 RX ADMIN — INSULIN ASPART 2 UNITS: 100 INJECTION, SOLUTION INTRAVENOUS; SUBCUTANEOUS at 10:12

## 2024-12-08 RX ADMIN — FUROSEMIDE 80 MG: 40 TABLET ORAL at 10:12

## 2024-12-08 RX ADMIN — PREDNISONE 5 MG: 5 TABLET ORAL at 10:12

## 2024-12-08 RX ADMIN — MYCOPHENOLATE MOFETIL 500 MG: 250 CAPSULE ORAL at 10:12

## 2024-12-08 RX ADMIN — CARVEDILOL 25 MG: 25 TABLET, FILM COATED ORAL at 11:12

## 2024-12-08 RX ADMIN — INSULIN ASPART 4 UNITS: 100 INJECTION, SOLUTION INTRAVENOUS; SUBCUTANEOUS at 12:12

## 2024-12-08 RX ADMIN — ATORVASTATIN CALCIUM 40 MG: 40 TABLET, FILM COATED ORAL at 10:12

## 2024-12-08 RX ADMIN — HYDRALAZINE HYDROCHLORIDE 100 MG: 25 TABLET ORAL at 05:12

## 2024-12-08 NOTE — ASSESSMENT & PLAN NOTE
Continue home gabapentin; will monitor kidney function daily while on the medication as well since PHUONG on CKD.

## 2024-12-08 NOTE — CONSULTS
Inpatient consult to Physical Medicine Rehab  Consult performed by: Jena Julien NP  Consult ordered by: Candelario Bower MD      Consult received.      Jena Julien NP  Physical Medicine & Rehabilitation   12/08/2024

## 2024-12-08 NOTE — H&P
Arvind Jay - Neurosurgery ([facility])  Vascular Neurology  [facility]  History & Physical    Inpatient consult to Vascular (Stroke) Neurology  Consult performed by: Candelario Bower MD  Consult ordered by: Travis Ramírez MD  Reason for consult: acute MRI infarct        Assessment/Plan:     Patient is a 69 y.o. year old male with:    * Cardioembolic stroke  Mr. Ravi Zamorano is a 69 y.o. patient admitted for LSW. History of afib on Xarelto, HTN closely managed by cardiology, DM2, CKD3 s/p kidney transplant in 2016. Initially presented with acute LSW without any falls. LKN ~11PM PTA. NIHSS 1 with minor left sided facial paralysis (at baseline), CTH negative. MRA brain, head and neck negative. MRI brain with right cortical hyperintensity over motor strip on DWI. No indications of any acute intervention. Patient back at baseline.     Current etiology of symptoms suspected to be from embolic/cardioembolic in the setting of afib and LA dilation.     Plan   -Admit to Obs with VN   -Continue home xarelto   - Q4H Neuro checks, VS  - SBP goal <220  - PRN labetalol with parameters   - Na goal >135  - Maintain euvolemia  - Hgb A1c, TSH, lipid panel  - Daily CBC, CMP, Mg, Phos  - PT/OT consulted for evaluation    Antithrombotics for secondary stroke prevention: Antiplatelets: None: Restarted on home Xarelto. Loaded with  in the ED.     Statins for secondary stroke prevention and hyperlipidemia, if present:   Statins: Atorvastatin- 40 mg daily    Aggressive risk factor modification: HTN, DM, HLD, A-Fib     Rehab efforts: The patient has been evaluated by a stroke team provider and the therapy needs have been fully considered based off the presenting complaints and exam findings. The following therapy evaluations are needed: PT evaluate and treat, OT evaluate and treat, SLP evaluate and treat, PM&R evaluate for appropriate placement    Diagnostics ordered/pending: HgbA1C to assess blood glucose  levels, Lipid Profile to assess cholesterol levels, TTE to assess cardiac function/status , TSH to assess thyroid function    VTE prophylaxis: None: Reason for No Pharmacological VTE Prophylaxis: Currently on anticoagulation    BP parameters: Infarct: No intervention, SBP <220    CHF (congestive heart failure)    Echo    Result Date: 6/16/2024    Left Ventricle: The left ventricle is normal in size. Ventricular mass   is normal. Mild septal thickening. Regional wall motion abnormalities   present. See diagram for wall motion findings. Septal motion is abnormal.   There is normal systolic function with a visually estimated ejection   fraction of 55 - 60%. Ejection fraction by visual approximation is 58%.    Right Ventricle: Normal right ventricular cavity size. Wall thickness   is normal. Systolic function is normal.    Tricuspid Valve: There is mild regurgitation.    Pulmonary Artery: There is severe pulmonary hypertension. The estimated   pulmonary artery systolic pressure is 72 mmHg.    IVC/SVC: Normal venous pressure at 3 mmHg.    Pericardium: There is a small posterior effusion at base and none to   trivial otherwise.        Transesophageal echo (FELICIANO)    Result Date: 6/11/2024    FELICIANO prior to ablation. No evidence of intracardiac thrombus.    Left Ventricle: The left ventricle is normal in size. Normal wall   thickness. There is low normal systolic function with a visually estimated   ejection fraction of 50 - 55%.    Right Ventricle: Normal right ventricular cavity size. Wall thickness   is normal. Systolic function is normal.    Left Atrium: Left atrium is dilated. The left atrial appendage appears   normal. The left atrial appendage has a windsock morphology. Appendage   velocity is reduced at less than 40 cm/sec. The pulmonary veins have   systolic blunting. There is no thrombus in the cavity.    Right Atrium: Right atrium is dilated.    Mitral Valve: There is mild regurgitation.    Tricuspid Valve: There is  mild regurgitation.    Aorta: Grade 3 atherosclerosis of the descending aorta and in the   aortic arch with moderate thickening.    Pericardium: There is a small lateral effusion.        Echo    Result Date: 5/3/2024    Irregularly irregular rhythm was present during the study    Left Ventricle: The left ventricle is normal in size. Ventricular mass   is normal. Normal wall thickness. Normal wall motion. There is low normal   systolic function with a visually estimated ejection fraction of 50 - 55%.   Unable to assess diastolic function due to atrial fibrillation.   Singnificant beat to beat variabilty due  to AF.    Left Ventricle: Unable to assess diastolic function due to atrial   fibrillation. Normal left ventricular filling pressure.    Right Ventricle: Normal right ventricular cavity size. Wall thickness   is normal. Systolic function is normal.    Left Atrium: Left atrium is severely dilated.    Right Atrium: Right atrium is mildly dilated.    Aortic Valve: The aortic valve is a bicuspid valve. There is mild   aortic valve sclerosis. There is mild annular calcification present. There   is mild aortic regurgitation.    Mitral Valve: There is mild regurgitation.    Aorta: Aortic root is mildly dilated measuring 3.69 cm. Ascending aorta   is normal measuring 3.44 cm.    Pulmonary Artery: The estimated pulmonary artery systolic pressure is   42 mmHg.    IVC/SVC: Intermediate venous pressure at 8 mmHg.    Pericardium: There is a small posterior effusion. No indication of   cardiac tamponade. Left pleural effusion.        On lasix 40 BID at home. Will hold in the acute setting while on permissive hypertension.     Peripheral neuropathy  Resume home gabapentin     Paroxysmal A-fib  Resumed home xarelto.    S/P cadaveric kidney transplant 11/5/2016. ESRD secondary to HTN/DMII  S/p kidney transplant on 2016.     -resumed home micophenolate   -continue home prednisone   -KTM consulted and recommending holing tacrolimus  (Prograf) tonight and get levels in the morning     Hyperlipidemia  Resumed home atorvastatin 40     Hypertension  Hypertensive in the ED with SBP on the 220s. Received labetalol 10 x 2 while in the ED.   will allow BP to get in the 200's range but after 24 hrs will start introducing meds to obtain better BP control with management in house.     -Resumed home valsartan 320   -resumed home hydralazine 100mg PO Q8H   -PRN labetalol with parameters  SBP < 220           Type 2 diabetes mellitus with stage 3a chronic kidney disease, with long-term current use of insulin  -MDDSI   -POCG Q6H          STROKE DOCUMENTATION          NIH Scale:  1a. Level of Consciousness: 0-->Alert, keenly responsive  1b. LOC Questions: 0-->Answers both questions correctly  1c. LOC Commands: 0-->Performs both tasks correctly  2. Best Gaze: 0-->Normal  3. Visual: 0-->No visual loss  4. Facial Palsy: 1-->Minor paralysis (flattened nasolabial fold, asymmetry on smiling) (Left facial palsy (at baseline))  5a. Motor Arm, Left: 0-->No drift, limb holds 90 (or 45) degrees for full 10 secs (No drift. But weakness, see neurological exam.)  5b. Motor Arm, Right: 0-->No drift, limb holds 90 (or 45) degrees for full 10 secs  6a. Motor Leg, Left: 0-->No drift, leg holds 30 degree position for full 5 secs  6b. Motor Leg, Right: 0-->No drift, leg holds 30 degree position for full 5 secs  7. Limb Ataxia: 0-->Absent  8. Sensory: 0-->Normal, no sensory loss  9. Best Language: 0-->No aphasia, normal  10. Dysarthria: 0-->Normal  11. Extinction and Inattention (formerly Neglect): 0-->No abnormality  Total (NIH Stroke Scale): 1     Modified Shane Score: 0  Boris Coma Scale:    ABCD2 Score:    [id number] Score:   HAS -BLED Score:   ICH Score:   Hunt & Bustillo Classification:      Thrombolysis Candidate? No, Out of window - Symptom onset > 4.5 hours    Delays to Thrombolysis?  Not Applicable    Interventional Revascularization Candidate?   Is the patient eligible  for mechanical endovascular reperfusion (MICHELLE)?   Not applicable    Delays to Thrombectomy? Not Applicable    Hemorrhagic change of an Ischemic Stroke: Does this patient have an ischemic stroke with hemorrhagic changes? No         Subjective:     History of Present Illness:  Mr. Ravi Zamorano is a 69 y.o. patient presenting to the ED with LSW. History of CHF, HTN, HLD, CKD3 status post kidney transplant in 2016, PAD, afib on xarelto (~1 year) was on eliquis before but switched due to insurance unable to pay. Presented with LSW. LKN 11PM 12/6/24. Woke up around 10AM with LSW (UE > LE) was unable to have full  on his left hand. According to patient he took a nap and then woke up 30 minutes later with LSW. Wife at bedside complementing history.     SBP 220s. Received Labetalol x 1 in the ED and loaded with  once in the ED.           Past Medical History:   Diagnosis Date    Anticoagulant long-term use     Anxiety 07/29/2014    Arthritis     atrial fibrillation     Bilateral diabetic retinopathy 2017    Cardioembolic stroke 12/7/2024    CKD (chronic kidney disease) stage 2, GFR 60-89 ml/min 12/28/2016    CKD (chronic kidney disease) stage 4, GFR 15-29 ml/min 07/29/2014    Colon polyps 2014    Depression - situational 07/29/2014    Diabetes mellitus     Diabetes type 2 since 2000 07/29/2014    Diabetic neuropathy 07/29/2014    Encounter for blood transfusion     History of cardioversion 10/03/2019    History of hepatitis C, s/p successful Rx w/ SVR24 - 9/2017 07/29/2014    Genotype 1a, treatment naive 10/2014 liver biopsy - grade 1 / stage 1 Completed Harvoni w/ SVR    Hyperlipidemia 07/29/2014    Hypertension     Neuropathy     Organ transplant candidate 07/29/2014    Pancreatitis     S/P cadaveric kidney transplant 11/5/2016. ESRD secondary to HTN/DMII 11/05/2016    Stage 3a chronic kidney disease 12/28/2016    Type 2 diabetes mellitus with stage 3a chronic kidney disease, with long-term current use of  insulin 07/29/2014     Past Surgical History:   Procedure Laterality Date    ABLATION OF ARRHYTHMOGENIC FOCUS FOR ATRIAL FIBRILLATION N/A 6/11/2024    Procedure: Ablation atrial fibrillation;  Surgeon: Bronson Bowden MD;  Location: [facility] EP LAB;  Service: Cardiology;  Laterality: N/A;  AF, FELICIANO (cx if SR), PVI, RFA, Carto, Gen, GP, 3 Prep    ABLATION, ATRIAL FLUTTER, TYPICAL  6/11/2024    Procedure: Ablation, Atrial Flutter, Typical;  Surgeon: Bronson Bowden MD;  Location: [facility] EP LAB;  Service: Cardiology;;    APPENDECTOMY      BONE MARROW BIOPSY Left 6/26/2024    Procedure: Biopsy-bone marrow;  Surgeon: Bridger Zapata MD;  Location: [facility] ENDO (4TH FLR);  Service: Oncology;  Laterality: Left;  6/24-pt knows to hold aspirin and eliquis as instructed by hem/onc, precall complete-Kpvt    CARDIOVERSION  6/11/2024    Procedure: Cardioversion;  Surgeon: Bronson Bowden MD;  Location: [facility] EP LAB;  Service: Cardiology;;    CATARACT EXTRACTION W/  INTRAOCULAR LENS IMPLANT Right 3/13/2024    Procedure: EXTRACTION, CATARACT, WITH IOL INSERTION;  Surgeon: Jennifer Palacio MD;  Location: [facility] OR;  Service: Ophthalmology;  Laterality: Right;    CATARACT EXTRACTION W/  INTRAOCULAR LENS IMPLANT Left 4/10/2024    Procedure: EXTRACTION, CATARACT, WITH IOL INSERTION;  Surgeon: Jennifer Palacio MD;  Location: [facility] OR;  Service: Ophthalmology;  Laterality: Left;    COLONOSCOPY N/A 12/21/2020    Procedure: COLONOSCOPY;  Surgeon: Tico Bell MD;  Location: [facility] ENDO (4TH FLR);  Service: Endoscopy;  Laterality: N/A;  ok to hold eliquis per Dr. Valadez, see telephone encounter 11/13/2020-MS  covid test 12/18 [place]    KIDNEY TRANSPLANT      TRANSESOPHAGEAL ECHOCARDIOGRAPHY N/A 8/26/2019    Procedure: ECHOCARDIOGRAM, TRANSESOPHAGEAL;  Surgeon: Dolores Diagnostic Provider;  Location: [facility] EP LAB;  Service: Cardiology;  Laterality: N/A;    TRANSESOPHAGEAL ECHOCARDIOGRAPHY N/A 6/11/2024    Procedure: ECHOCARDIOGRAM,  TRANSESOPHAGEAL;  Surgeon: Grayson Lin III, MD;  Location: [facility] EP LAB;  Service: Cardiology;  Laterality: N/A;    TREATMENT OF CARDIAC ARRHYTHMIA N/A 2019    Procedure: CARDIOVERSION;  Surgeon: Bronosn Bowden MD;  Location: [facility] EP LAB;  Service: Cardiology;  Laterality: N/A;  AF, FELICIANO, DCCV, MAC, GP, 3 PREP     Social History     Tobacco Use    Smoking status: Former     Current packs/day: 0.00     Average packs/day: 0.5 packs/day for 32.0 years (16.0 ttl pk-yrs)     Types: Cigarettes     Start date: 1984     Quit date: 2016     Years since quittin.0    Smokeless tobacco: Never    Tobacco comments:     Pt reports that he quit in , but started up again in . pt reports he is currently working on quitting again   Substance Use Topics    Alcohol use: Yes     Comment: Pt reports occasional beers on Sundays. Pt reports drinking daily prior to ESRD diagnosis.    Drug use: No     Review of patient's allergies indicates:   Allergen Reactions    Nifedipine Other (See Comments)     Gingival hyperplasia       Medications: I have reviewed the current medication administration record.    Facility-Administered Medications Prior to Admission   Medication Dose Route Frequency Provider Last Rate Last Admin    epoetin tanya injection 4,000 Units  4,000 Units Subcutaneous Q7 Days          Medications Prior to Admission   Medication Sig Dispense Refill Last Dose/Taking    aspirin 81 MG Chew Take 81 mg by mouth once daily.       atorvastatin (LIPITOR) 40 MG tablet Take 1 tablet (40 mg total) by mouth once daily. 90 tablet 3     blood sugar diagnostic Strp Use to check blood glucose 1 times daily, to use with insurance preferred meter 100 each 11     blood-glucose meter Misc Use to check blood glucose 1 times daily, to use with insurance preferred meter 1 each 0     blood-glucose sensor Kayla Gin 3, use as directed, change every 14 days , e 11.65 2 each 11     carvediloL (COREG) 25 MG tablet Take 1  tablet (25 mg total) by mouth 2 (two) times daily. 180 tablet 3     doxazosin (CARDURA) 1 MG tablet Take 1 tablet (1 mg total) by mouth every evening. 30 tablet 11     empagliflozin (JARDIANCE) 10 mg tablet Take 1 tablet (10 mg total) by mouth once daily. 90 tablet 0     ergocalciferol (ERGOCALCIFEROL) 50,000 unit Cap Take 1 capsule (50,000 Units total) by mouth every 7 days. 4 capsule 6     furosemide (LASIX) 40 MG tablet Take 2 tablets (80 mg total) by mouth 2 (two) times a day. 360 tablet 3     gabapentin (NEURONTIN) 300 MG capsule Take 1 capsule by mouth in the morning, 1 capsule midday, and 3 capsules at night. 450 capsule 3     glucagon (GVOKE HYPOPEN 2-PACK) 1 mg/0.2 mL AtIn Inject 0.2 mLs into the skin as needed (severe hypoglycemia). 0.4 mL 2     hydrALAZINE (APRESOLINE) 100 MG tablet Take 1 tablet (100 mg total) by mouth every 8 (eight) hours. 270 tablet 3     influenza, adjuvanted, (FLUAD TRIV 2024-25,65Y UP,,PF,) 45 mcg (15 mcg x 3)/0.5 mL IM vaccine (> or = 64 yo) Inject into the muscle. 0.5 mL 0     insulin aspart U-100 (NOVOLOG) 100 unit/mL (3 mL) InPn pen Inject 10 units under the skin w/ breakfast, 7 units at lunch and dinner. Scale 180-230+2, 231-280+4, 281-330+6, 331-380+8, >380+10.  Max daily 57 units. 30 mL 11     insulin glargine U-100, Lantus, (LANTUS SOLOSTAR U-100 INSULIN) 100 unit/mL (3 mL) InPn pen Inject 20 Units into the skin every morning. 15 mL 6     lancets 30 gauge Misc Use to check blood glucose 1 times daily, to use with insurance preferred meter 100 each 3     magnesium oxide (MAG-OX) 400 mg (241.3 mg magnesium) tablet Take 1 tablet (400 mg total) by mouth 2 (two) times daily. 60 tablet 6     meclizine (ANTIVERT) 25 mg tablet Take 1 tablet (25 mg total) by mouth 3 (three) times daily as needed for Dizziness. 21 tablet 3     mycophenolate (CELLCEPT) 250 mg Cap Take 2 capsules (500 mg total) by mouth 2 (two) times daily. 120 capsule 11     pen needle, diabetic (BD ULTRA-FINE LO  "PEN NEEDLE) 32 gauge x 5/32" Ndle USE TO ADMINISTER INSULIN 4 TIMES DAILY. 400 each 3     predniSONE (DELTASONE) 5 MG tablet Take 1 tablet (5 mg total) by mouth once daily. 30 tablet 11     rivaroxaban (XARELTO) 15 mg Tab Take 1 tablet (15 mg total) by mouth daily with dinner or evening meal. 30 tablet 11     tacrolimus (PROGRAF) 0.5 MG Cap Take 1 capsule (0.5 mg total) by mouth once daily. 30 capsule 11     valsartan (DIOVAN) 320 MG tablet Take 1 tablet (320 mg total) by mouth once daily. 90 tablet 3        Review of Systems   Neurological:  Positive for facial asymmetry and weakness. Negative for numbness and headaches.     Objective:     Vital Signs (Most Recent):  Temp: 97.4 °F (36.3 °C) (12/07/24 1647)  Pulse: 68 (12/07/24 1647)  Resp: 18 (12/07/24 1647)  BP: (!) 204/93 (12/07/24 1647)  SpO2: 95 % (12/07/24 1647)    Vital Signs Range (Last 24H):  Temp:  [97.4 °F (36.3 °C)-97.9 °F (36.6 °C)]   Pulse:  [59-77]   Resp:  [15-20]   BP: (198-234)/()   SpO2:  [94 %-96 %]        Physical Exam  Vitals reviewed.   Neurological:      Mental Status: He is alert.      Cranial Nerves: No cranial nerve deficit.      Sensory: No sensory deficit.      Motor: Weakness present.      Comments: See neurological exam               Neurological Exam:   LOC: alert  Attention Span: Good   Language: No aphasia  Articulation: No dysarthria  Orientation: Person, Place, Time   Visual Fields: Full  EOM (CN III, IV, VI): Full/intact  Pupils (CN II, III): PERRL  Facial Sensation (CN V): Normal  Facial Movement (CN VII): Lower facial weakness on the Left  Motor: Arm left  Paresis: 3/5  Leg left  Normal 5/5  Arm right  Normal 5/5  Leg right Normal 5/5  Cerebellum: No evidence of appendicular or axial ataxia  Tone: Normal tone throughout      Laboratory:  CMP:   Recent Labs   Lab 12/07/24  0629   CALCIUM 8.5*   ALBUMIN 2.8*   PROT 5.8*   *   K 4.2   CO2 22*      BUN 35*   CREATININE 2.4*   ALKPHOS 53   ALT <5*   AST 15 "   BILITOT 0.3     CBC:   Recent Labs   Lab 12/07/24  0629   WBC 5.18   RBC 4.55*   HGB 10.4*   HCT 35.8*   *   MCV 79*   MCH 22.9*   MCHC 29.1*     Lipid Panel:   Recent Labs   Lab 12/07/24  0629   CHOL 172   LDLCALC 105.4   HDL 30*   TRIG 183*       Diagnostic Results:      Brain imaging:  MRA Brain  no major branch advanced stenosis/occlusion at the [id number].     No evidence of aneurysm or AVM.      MRI Brain   8 mm focus of restricted diffusion within the white matter of the right frontal lobe subcortically consistent with an acute infarct.  No other acute infarct is identified within the remainder of the brain parenchyma.     No hydrocephalus mass effect or intracranial hemorrhage.     Increased signal within the periventricular and subcortical white matter is nonspecific but may reflect mild approaching moderate chronic small vessel ischemic change.  Chronic right thalamic lacunar infarct.     Punctate chronic hemosiderin staining left frontal subcortical white matter.     Minimal right maxillary mucosal thickening.  The mastoid air cells are essentially clear.     Impression:     Subcentimeter acute infarct subcortical white matter right frontal lobe.  No intracranial hemorrhage or mass effect.      CTH    Blood: No acute intracranial hemorrhage.     Parenchyma: No definite loss of gray-white differentiation to suggest acute or subacute transcortical infarct. There is supratentorial primarily periventricular hypoattenuation, nonspecific.     Ventricles/Extra-axial spaces: No abnormal extra-axial fluid collection. Basal cisterns are patent.     Vessels: Severe intracranial vascular calcifications     Orbits: Unremarkable.     Scalp: Unremarkable.     Skull: There are no depressed skull fractures or destructive bone lesions.     Sinuses and mastoids: Essentially clear.     Other findings: None     Cervical spine:     Fractures: No acute fractures     Alignment: There is no significant vertebral  subluxation  Atlanto-axial and atlanto-occipital joints: Atlanto-axial and atlanto-occipital intervals are not widened.  Facet joints: There is no traumatic facet joint widening.  Vertebral bodies: Normal  Discs:  Spinal canal and foraminal narrowing: Although CT does not optimally evaluate the soft tissue contents of the spinal canal and foramina, no critical stenosis is suggested.  Paraspinal soft tissues: Unremarkable.     Upper Lungs:Bilateral pleural effusions are present.     Impression:     1. No acute intracranial abnormality.  2. No fracture or subluxation of the cervical spine.    Cardiac Evaluation:   Pending Echo        Candelario Bower MD  Com[facility] St[facility] Center  Department of Vascular Neurology   [facility])

## 2024-12-08 NOTE — SUBJECTIVE & OBJECTIVE
Past Medical History:   Diagnosis Date    Anticoagulant long-term use     Anxiety 07/29/2014    Arthritis     atrial fibrillation     Bilateral diabetic retinopathy 2017    Cardioembolic stroke 12/7/2024    CKD (chronic kidney disease) stage 2, GFR 60-89 ml/min 12/28/2016    CKD (chronic kidney disease) stage 4, GFR 15-29 ml/min 07/29/2014    Colon polyps 2014    Depression - situational 07/29/2014    Diabetes mellitus     Diabetes type 2 since 2000 07/29/2014    Diabetic neuropathy 07/29/2014    Encounter for blood transfusion     History of cardioversion 10/03/2019    History of hepatitis C, s/p successful Rx w/ SVR24 - 9/2017 07/29/2014    Genotype 1a, treatment naive 10/2014 liver biopsy - grade 1 / stage 1 Completed Harvoni w/ SVR    Hyperlipidemia 07/29/2014    Hypertension     Neuropathy     Organ transplant candidate 07/29/2014    Pancreatitis     S/P cadaveric kidney transplant 11/5/2016. ESRD secondary to HTN/DMII 11/05/2016    Stage 3a chronic kidney disease 12/28/2016    Type 2 diabetes mellitus with stage 3a chronic kidney disease, with long-term current use of insulin 07/29/2014     Past Surgical History:   Procedure Laterality Date    ABLATION OF ARRHYTHMOGENIC FOCUS FOR ATRIAL FIBRILLATION N/A 6/11/2024    Procedure: Ablation atrial fibrillation;  Surgeon: Bronson Bowden MD;  Location: [facility] EP LAB;  Service: Cardiology;  Laterality: N/A;  AF, FELICIANO (cx if SR), PVI, RFA, Carto, Gen, GP, 3 Prep    ABLATION, ATRIAL FLUTTER, TYPICAL  6/11/2024    Procedure: Ablation, Atrial Flutter, Typical;  Surgeon: Bronson Bowden MD;  Location: [facility] EP LAB;  Service: Cardiology;;    APPENDECTOMY      BONE MARROW BIOPSY Left 6/26/2024    Procedure: Biopsy-bone marrow;  Surgeon: Bridger Zapata MD;  Location: [facility] ENDO (4TH FLR);  Service: Oncology;  Laterality: Left;  6/24-pt knows to hold aspirin and eliquis as instructed by hem/onc, precall complete-Kpvt    CARDIOVERSION  6/11/2024    Procedure:  Cardioversion;  Surgeon: Bronson Bowden MD;  Location: [facility] EP LAB;  Service: Cardiology;;    CATARACT EXTRACTION W/  INTRAOCULAR LENS IMPLANT Right 3/13/2024    Procedure: EXTRACTION, CATARACT, WITH IOL INSERTION;  Surgeon: Jennifer Palacio MD;  Location: [facility] OR;  Service: Ophthalmology;  Laterality: Right;    CATARACT EXTRACTION W/  INTRAOCULAR LENS IMPLANT Left 4/10/2024    Procedure: EXTRACTION, CATARACT, WITH IOL INSERTION;  Surgeon: Jennifer Palacio MD;  Location: [facility] OR;  Service: Ophthalmology;  Laterality: Left;    COLONOSCOPY N/A 2020    Procedure: COLONOSCOPY;  Surgeon: Tico Bell MD;  Location: [facility] ENDO (4TH FLR);  Service: Endoscopy;  Laterality: N/A;  ok to hold eliquis per Dr. Valadez, see telephone encounter 2020-[place]  covid test  [place]    KIDNEY TRANSPLANT      TRANSESOPHAGEAL ECHOCARDIOGRAPHY N/A 2019    Procedure: ECHOCARDIOGRAM, TRANSESOPHAGEAL;  Surgeon: Dolores Diagnostic Provider;  Location: [facility] EP LAB;  Service: Cardiology;  Laterality: N/A;    TRANSESOPHAGEAL ECHOCARDIOGRAPHY N/A 2024    Procedure: ECHOCARDIOGRAM, TRANSESOPHAGEAL;  Surgeon: Grayson Lin III, MD;  Location: [facility] EP LAB;  Service: Cardiology;  Laterality: N/A;    TREATMENT OF CARDIAC ARRHYTHMIA N/A 2019    Procedure: CARDIOVERSION;  Surgeon: Bronson Bowden MD;  Location: [facility] EP LAB;  Service: Cardiology;  Laterality: N/A;  AF, FELICIANO, DCCV, MAC, GP, 3 PREP     Social History     Tobacco Use    Smoking status: Former     Current packs/day: 0.00     Average packs/day: 0.5 packs/day for 32.0 years (16.0 ttl pk-yrs)     Types: Cigarettes     Start date: 1984     Quit date: 2016     Years since quittin.0    Smokeless tobacco: Never    Tobacco comments:     Pt reports that he quit in , but started up again in . pt reports he is currently working on quitting again   Substance Use Topics    Alcohol use: Yes     Comment: Pt reports occasional beers on Sundays. Pt  reports drinking daily prior to ESRD diagnosis.    Drug use: No     Review of patient's allergies indicates:   Allergen Reactions    Nifedipine Other (See Comments)     Gingival hyperplasia       Medications: I have reviewed the current medication administration record.    Facility-Administered Medications Prior to Admission   Medication Dose Route Frequency Provider Last Rate Last Admin    epoetin tanya injection 4,000 Units  4,000 Units Subcutaneous Q7 Days          Medications Prior to Admission   Medication Sig Dispense Refill Last Dose/Taking    aspirin 81 MG Chew Take 81 mg by mouth once daily.       atorvastatin (LIPITOR) 40 MG tablet Take 1 tablet (40 mg total) by mouth once daily. 90 tablet 3     blood sugar diagnostic Strp Use to check blood glucose 1 times daily, to use with insurance preferred meter 100 each 11     blood-glucose meter Misc Use to check blood glucose 1 times daily, to use with insurance preferred meter 1 each 0     blood-glucose sensor Kayla Gin 3, use as directed, change every 14 days , e 11.65 2 each 11     carvediloL (COREG) 25 MG tablet Take 1 tablet (25 mg total) by mouth 2 (two) times daily. 180 tablet 3     doxazosin (CARDURA) 1 MG tablet Take 1 tablet (1 mg total) by mouth every evening. 30 tablet 11     empagliflozin (JARDIANCE) 10 mg tablet Take 1 tablet (10 mg total) by mouth once daily. 90 tablet 0     ergocalciferol (ERGOCALCIFEROL) 50,000 unit Cap Take 1 capsule (50,000 Units total) by mouth every 7 days. 4 capsule 6     furosemide (LASIX) 40 MG tablet Take 2 tablets (80 mg total) by mouth 2 (two) times a day. 360 tablet 3     gabapentin (NEURONTIN) 300 MG capsule Take 1 capsule by mouth in the morning, 1 capsule midday, and 3 capsules at night. 450 capsule 3     glucagon (GVOKE HYPOPEN 2-PACK) 1 mg/0.2 mL AtIn Inject 0.2 mLs into the skin as needed (severe hypoglycemia). 0.4 mL 2     hydrALAZINE (APRESOLINE) 100 MG tablet Take 1 tablet (100 mg total) by mouth every 8  "(eight) hours. 270 tablet 3     influenza, adjuvanted, (FLUAD TRIV 2024-25,65Y UP,,PF,) 45 mcg (15 mcg x 3)/0.5 mL IM vaccine (> or = 66 yo) Inject into the muscle. 0.5 mL 0     insulin aspart U-100 (NOVOLOG) 100 unit/mL (3 mL) InPn pen Inject 10 units under the skin w/ breakfast, 7 units at lunch and dinner. Scale 180-230+2, 231-280+4, 281-330+6, 331-380+8, >380+10.  Max daily 57 units. 30 mL 11     insulin glargine U-100, Lantus, (LANTUS SOLOSTAR U-100 INSULIN) 100 unit/mL (3 mL) InPn pen Inject 20 Units into the skin every morning. 15 mL 6     lancets 30 gauge Misc Use to check blood glucose 1 times daily, to use with insurance preferred meter 100 each 3     magnesium oxide (MAG-OX) 400 mg (241.3 mg magnesium) tablet Take 1 tablet (400 mg total) by mouth 2 (two) times daily. 60 tablet 6     meclizine (ANTIVERT) 25 mg tablet Take 1 tablet (25 mg total) by mouth 3 (three) times daily as needed for Dizziness. 21 tablet 3     mycophenolate (CELLCEPT) 250 mg Cap Take 2 capsules (500 mg total) by mouth 2 (two) times daily. 120 capsule 11     pen needle, diabetic (BD ULTRA-FINE LO PEN NEEDLE) 32 gauge x 5/32" Ndle USE TO ADMINISTER INSULIN 4 TIMES DAILY. 400 each 3     predniSONE (DELTASONE) 5 MG tablet Take 1 tablet (5 mg total) by mouth once daily. 30 tablet 11     rivaroxaban (XARELTO) 15 mg Tab Take 1 tablet (15 mg total) by mouth daily with dinner or evening meal. 30 tablet 11     tacrolimus (PROGRAF) 0.5 MG Cap Take 1 capsule (0.5 mg total) by mouth once daily. 30 capsule 11     valsartan (DIOVAN) 320 MG tablet Take 1 tablet (320 mg total) by mouth once daily. 90 tablet 3        Review of Systems   Neurological:  Positive for facial asymmetry and weakness. Negative for numbness and headaches.     Objective:     Vital Signs (Most Recent):  Temp: 97.4 °F (36.3 °C) (12/07/24 1647)  Pulse: 68 (12/07/24 1647)  Resp: 18 (12/07/24 1647)  BP: (!) 204/93 (12/07/24 1647)  SpO2: 95 % (12/07/24 1647)    Vital Signs Range " (Last 24H):  Temp:  [97.4 °F (36.3 °C)-97.9 °F (36.6 °C)]   Pulse:  [59-77]   Resp:  [15-20]   BP: (198-234)/()   SpO2:  [94 %-96 %]        Physical Exam  Vitals reviewed.   Neurological:      Mental Status: He is alert.      Cranial Nerves: No cranial nerve deficit.      Sensory: No sensory deficit.      Motor: Weakness present.      Comments: See neurological exam               Neurological Exam:   LOC: alert  Attention Span: Good   Language: No aphasia  Articulation: No dysarthria  Orientation: Person, Place, Time   Visual Fields: Full  EOM (CN III, IV, VI): Full/intact  Pupils (CN II, III): PERRL  Facial Sensation (CN V): Normal  Facial Movement (CN VII): Lower facial weakness on the Left  Motor: Arm left  Paresis: 3/5  Leg left  Normal 5/5  Arm right  Normal 5/5  Leg right Normal 5/5  Cerebellum: No evidence of appendicular or axial ataxia  Tone: Normal tone throughout      Laboratory:  CMP:   Recent Labs   Lab 12/07/24  0629   CALCIUM 8.5*   ALBUMIN 2.8*   PROT 5.8*   *   K 4.2   CO2 22*      BUN 35*   CREATININE 2.4*   ALKPHOS 53   ALT <5*   AST 15   BILITOT 0.3     CBC:   Recent Labs   Lab 12/07/24  0629   WBC 5.18   RBC 4.55*   HGB 10.4*   HCT 35.8*   *   MCV 79*   MCH 22.9*   MCHC 29.1*     Lipid Panel:   Recent Labs   Lab 12/07/24  0629   CHOL 172   LDLCALC 105.4   HDL 30*   TRIG 183*       Diagnostic Results:      Brain imaging:  MRA Brain  no major branch advanced stenosis/occlusion at the [id number]-[id number]-[id number].     No evidence of aneurysm or AVM.      MRI Brain   8 mm focus of restricted diffusion within the white matter of the right frontal lobe subcortically consistent with an acute infarct.  No other acute infarct is identified within the remainder of the brain parenchyma.     No hydrocephalus mass effect or intracranial hemorrhage.     Increased signal within the periventricular and subcortical white matter is nonspecific but may reflect mild approaching moderate chronic small  vessel ischemic change.  Chronic right thalamic lacunar infarct.     Punctate chronic hemosiderin staining left frontal subcortical white matter.     Minimal right maxillary mucosal thickening.  The mastoid air cells are essentially clear.     Impression:     Subcentimeter acute infarct subcortical white matter right frontal lobe.  No intracranial hemorrhage or mass effect.      CTH    Blood: No acute intracranial hemorrhage.     Parenchyma: No definite loss of gray-white differentiation to suggest acute or subacute transcortical infarct. There is supratentorial primarily periventricular hypoattenuation, nonspecific.     Ventricles/Extra-axial spaces: No abnormal extra-axial fluid collection. Basal cisterns are patent.     Vessels: Severe intracranial vascular calcifications     Orbits: Unremarkable.     Scalp: Unremarkable.     Skull: There are no depressed skull fractures or destructive bone lesions.     Sinuses and mastoids: Essentially clear.     Other findings: None     Cervical spine:     Fractures: No acute fractures     Alignment: There is no significant vertebral subluxation  Atlanto-axial and atlanto-occipital joints: Atlanto-axial and atlanto-occipital intervals are not widened.  Facet joints: There is no traumatic facet joint widening.  Vertebral bodies: Normal  Discs:  Spinal canal and foraminal narrowing: Although CT does not optimally evaluate the soft tissue contents of the spinal canal and foramina, no critical stenosis is suggested.  Paraspinal soft tissues: Unremarkable.     Upper Lungs:Bilateral pleural effusions are present.     Impression:     1. No acute intracranial abnormality.  2. No fracture or subluxation of the cervical spine.    Cardiac Evaluation:   Pending Echo

## 2024-12-08 NOTE — PT/OT/SLP EVAL
"Physical Therapy Evaluation and Discharge Note    Patient Name:  Ravi Zamorano   MRN:  [id number]    Recommendations:     Discharge Recommendations: No Therapy Indicated  Discharge Equipment Recommendations: none   Barriers to discharge: None    Assessment:     Ravi Zamorano is a 69 y.o. male admitted with a medical diagnosis of Cardioembolic stroke. .  At this time, patient is functioning at their prior level of function and does not require further acute PT services.     Recent Surgery: * No surgery found *      Plan:     During this hospitalization, patient does not require further acute PT services.  Please re-consult if situation changes.      Subjective     Chief Complaint: Stroke  Patient/Family Comments/goals: "I'm ready to go home"  Pain/Comfort:  Pain Rating 1: 0/10  Pain Rating Post-Intervention 1: 0/10    Patients cultural, spiritual, Nondenominational conflicts given the current situation: no    Living Environment:  Pt lives with spouse in [facility] w/ [address] [facility]. He has a t/s combo for bathing.  Prior to admission, patients level of function was (I) with functional mobility and ADLs using no SPC prn. Equipment used at home: cane, straight  DME owned (not currently used): none.  Upon discharge, patient will have assistance from Spouse.    Objective:     Communicated with RN prior to session.  Patient found HOB elevated with  (no active lines) upon PT entry to room.    General Precautions: Standard, fall    Orthopedic Precautions:N/A   Braces: N/A  Respiratory Status: Room air    Exams:  Cognitive Exam:  Patient is oriented to Person, Place, Time, and Situation  Gross Motor Coordination:  WFL  Postural Exam:  Patient presented with the following abnormalities:    -       No postural abnormalities identified  Sensation:    -       Intact  light/touch B LEs  RLE ROM: WNL  RLE Strength: WFL  LLE ROM: WNL  LLE Strength: WFL    Functional Mobility:  Bed Mobility:     Supine to Sit: independence  Sit to Supine: " independence  Transfers:     Sit to Stand:  independence with no AD  Gait: Pt ambulated in hallway ~70' with supervision and no AD. Pt demos no overt LOB and reports is ambulating at his baseline    AM-PAC 6 CLICK MOBILITY  Total Score:24     Treatment and Education:  PT assisted with functional mobility as noted above.  Discussed d/c from acute PT services, pt agreeable.  PT instructed pt to ambulate with staff at least 3x daily to prevent functional decline during hospital stay.    AM-PAC 6 CLICK MOBILITY  Total Score:24     Patient left HOB elevated with all lines intact, call button in reach, RN notified, and spouse present.    GOALS:   Multidisciplinary Problems       Physical Therapy Goals       Not on file              Multidisciplinary Problems (Resolved)          Problem: Physical Therapy    Goal Priority Disciplines Outcome Interventions   Physical Therapy Goal   (Resolved)     PT, PT/OT Met    Description: No goals established at this time- see eval 12/8                       History:     Past Medical History:   Diagnosis Date    Anticoagulant long-term use     Anxiety 07/29/2014    Arthritis     atrial fibrillation     Bilateral diabetic retinopathy 2017    Cardioembolic stroke 12/7/2024    CKD (chronic kidney disease) stage 2, GFR 60-89 ml/min 12/28/2016    CKD (chronic kidney disease) stage 4, GFR 15-29 ml/min 07/29/2014    Colon polyps 2014    Depression - situational 07/29/2014    Diabetes mellitus     Diabetes type 2 since 2000 07/29/2014    Diabetic neuropathy 07/29/2014    Encounter for blood transfusion     History of cardioversion 10/03/2019    History of hepatitis C, s/p successful Rx w/ SVR24 - 9/2017 07/29/2014    Genotype 1a, treatment naive 10/2014 liver biopsy - grade 1 / stage 1 Completed Harvoni w/ SVR    Hyperlipidemia 07/29/2014    Hypertension     Neuropathy     Organ transplant candidate 07/29/2014    Pancreatitis     S/P cadaveric kidney transplant 11/5/2016. ESRD secondary to  HTN/DMII 11/05/2016    Stage 3a chronic kidney disease 12/28/2016    Type 2 diabetes mellitus with stage 3a chronic kidney disease, with long-term current use of insulin 07/29/2014       Past Surgical History:   Procedure Laterality Date    ABLATION OF ARRHYTHMOGENIC FOCUS FOR ATRIAL FIBRILLATION N/A 6/11/2024    Procedure: Ablation atrial fibrillation;  Surgeon: Bronson Bowden MD;  Location: [facility] EP LAB;  Service: Cardiology;  Laterality: N/A;  AF, FELICIANO (cx if SR), PVI, RFA, Carto, Gen, GP, 3 Prep    ABLATION, ATRIAL FLUTTER, TYPICAL  6/11/2024    Procedure: Ablation, Atrial Flutter, Typical;  Surgeon: Bronson Bowden MD;  Location: [facility] EP LAB;  Service: Cardiology;;    APPENDECTOMY      BONE MARROW BIOPSY Left 6/26/2024    Procedure: Biopsy-bone marrow;  Surgeon: Bridger Zapata MD;  Location: [facility] ENDO (4TH FLR);  Service: Oncology;  Laterality: Left;  6/24-pt knows to hold aspirin and eliquis as instructed by hem/onc, precall complete-Kpvt    CARDIOVERSION  6/11/2024    Procedure: Cardioversion;  Surgeon: Bronson Bowden MD;  Location: [facility] EP LAB;  Service: Cardiology;;    CATARACT EXTRACTION W/  INTRAOCULAR LENS IMPLANT Right 3/13/2024    Procedure: EXTRACTION, CATARACT, WITH IOL INSERTION;  Surgeon: Jennifer Palacio MD;  Location: [facility] OR;  Service: Ophthalmology;  Laterality: Right;    CATARACT EXTRACTION W/  INTRAOCULAR LENS IMPLANT Left 4/10/2024    Procedure: EXTRACTION, CATARACT, WITH IOL INSERTION;  Surgeon: Jennifer Palacio MD;  Location: [facility] OR;  Service: Ophthalmology;  Laterality: Left;    COLONOSCOPY N/A 12/21/2020    Procedure: COLONOSCOPY;  Surgeon: Tico Bell MD;  Location: [facility] ENDO (4TH FLR);  Service: Endoscopy;  Laterality: N/A;  ok to hold eliquis per Dr. Valadez, see telephone encounter 11/13/2020-[place]  covid test 12/18 [facility]    KIDNEY TRANSPLANT      TRANSESOPHAGEAL ECHOCARDIOGRAPHY N/A 8/26/2019    Procedure: ECHOCARDIOGRAM, TRANSESOPHAGEAL;  Surgeon: Dosc Diagnostic  Provider;  Location: [facility] EP LAB;  Service: Cardiology;  Laterality: N/A;    TRANSESOPHAGEAL ECHOCARDIOGRAPHY N/A 6/11/2024    Procedure: ECHOCARDIOGRAM, TRANSESOPHAGEAL;  Surgeon: Grayson Lin III, MD;  Location: [facility] EP LAB;  Service: Cardiology;  Laterality: N/A;    TREATMENT OF CARDIAC ARRHYTHMIA N/A 8/26/2019    Procedure: CARDIOVERSION;  Surgeon: Bronson Bowden MD;  Location: [facility] EP LAB;  Service: Cardiology;  Laterality: N/A;  AF, FELICIANO, DCCV, MAC, GP, 3 PREP       Time Tracking:     PT Received On: 12/08/24  PT Start Time: 1249     PT Stop Time: 1258  PT Total Time (min): 9 min     Billable Minutes: Evaluation 9      12/08/2024

## 2024-12-08 NOTE — ASSESSMENT & PLAN NOTE
Echo    Result Date: 6/16/2024    Left Ventricle: The left ventricle is normal in size. Ventricular mass   is normal. Mild septal thickening. Regional wall motion abnormalities   present. See diagram for wall motion findings. Septal motion is abnormal.   There is normal systolic function with a visually estimated ejection   fraction of 55 - 60%. Ejection fraction by visual approximation is 58%.    Right Ventricle: Normal right ventricular cavity size. Wall thickness   is normal. Systolic function is normal.    Tricuspid Valve: There is mild regurgitation.    Pulmonary Artery: There is severe pulmonary hypertension. The estimated   pulmonary artery systolic pressure is 72 mmHg.    IVC/SVC: Normal venous pressure at 3 mmHg.    Pericardium: There is a small posterior effusion at base and none to   trivial otherwise.        Transesophageal echo (FELICIANO)    Result Date: 6/11/2024    FELICIANO prior to ablation. No evidence of intracardiac thrombus.    Left Ventricle: The left ventricle is normal in size. Normal wall   thickness. There is low normal systolic function with a visually estimated   ejection fraction of 50 - 55%.    Right Ventricle: Normal right ventricular cavity size. Wall thickness   is normal. Systolic function is normal.    Left Atrium: Left atrium is dilated. The left atrial appendage appears   normal. The left atrial appendage has a windsock morphology. Appendage   velocity is reduced at less than 40 cm/sec. The pulmonary veins have   systolic blunting. There is no thrombus in the cavity.    Right Atrium: Right atrium is dilated.    Mitral Valve: There is mild regurgitation.    Tricuspid Valve: There is mild regurgitation.    Aorta: Grade 3 atherosclerosis of the descending aorta and in the   aortic arch with moderate thickening.    Pericardium: There is a small lateral effusion.        Echo    Result Date: 5/3/2024    Irregularly irregular rhythm was present during the study    Left Ventricle: The left  ventricle is normal in size. Ventricular mass   is normal. Normal wall thickness. Normal wall motion. There is low normal   systolic function with a visually estimated ejection fraction of 50 - 55%.   Unable to assess diastolic function due to atrial fibrillation.   Singnificant beat to beat variabilty due  to AF.    Left Ventricle: Unable to assess diastolic function due to atrial   fibrillation. Normal left ventricular filling pressure.    Right Ventricle: Normal right ventricular cavity size. Wall thickness   is normal. Systolic function is normal.    Left Atrium: Left atrium is severely dilated.    Right Atrium: Right atrium is mildly dilated.    Aortic Valve: The aortic valve is a bicuspid valve. There is mild   aortic valve sclerosis. There is mild annular calcification present. There   is mild aortic regurgitation.    Mitral Valve: There is mild regurgitation.    Aorta: Aortic root is mildly dilated measuring 3.69 cm. Ascending aorta   is normal measuring 3.44 cm.    Pulmonary Artery: The estimated pulmonary artery systolic pressure is   42 mmHg.    IVC/SVC: Intermediate venous pressure at 8 mmHg.    Pericardium: There is a small posterior effusion. No indication of   cardiac tamponade. Left pleural effusion.        -Resumed home Lasix 80 BID  -Strict I/Os

## 2024-12-08 NOTE — DISCHARGE INSTRUCTIONS
Continue taking your BP medications.   See your PCP 1 week from today to make sure your blood pressure is under control.   Ask about the digital medicine BP program     Discontinue aspirin and continue xarelto as you are taking   Resume your tacrolimus tomorrow and you will be called to get a repeat level on Wednesday.     You will also need to see your cardiologist to follow up on your heart failure as you may need medication adjustments.

## 2024-12-08 NOTE — PT/OT/SLP EVAL
Occupational Therapy   Evaluation and Discharge Note    Name: Ravi Zamorano  MRN: [id number]  Admitting Diagnosis: Cardioembolic stroke  Recent Surgery: * No surgery found *      Recommendations:     Discharge Recommendations: No Therapy Indicated  Discharge Equipment Recommendations: none  Barriers to discharge:  None    Assessment:     Ravi Zamorano is a 69 y.o. male with a medical diagnosis of Cardioembolic stroke. Pt was agreeable to and participated in OT evaluation.  Pt reports that he lives with his wife and was independent with mobility and ADLS at [facility].  Pt completed his evaluation and noted to perform all tasks with mod I.  Some mild fine motor coordination deficits were noted during his evaluation, but did not impede on his performance level with ADLS as he is right hand dominant.  Pt and wife were provided with education on exercises / activities he may work on to improve the overall fine motor coordination and speed in his L hand at discharge.  Pt will have assistance/supervision as needed once discharged home.  Due to these factors, pt is discharged from OT services.  No DME or post acute OT required at this time.     Plan:     During this hospitalization, patient does not require further acute OT services.  Please re-consult if situation changes.    Plan of Care Reviewed with: spouse, patient    Subjective     Chief Complaint: none given  Patient/Family Comments/goals: return home    Occupational Profile:  Living Environment: pt lives with his wife in a H with THE entrance - T/S combo for bathing  Previous level of function: mod I with mobility and ADLS - occasionally uses SPC due to pain in knee  Equipment Used at home: cane, straight  Assistance upon Discharge: from wife    Pain/Comfort:  Pain Rating 1: 0/10  Pain Rating Post-Intervention 1: 0/10    Patients cultural, spiritual, Alevism conflicts given the current situation: no    Objective:     Communicated with: nurse prior to session.   Patient found HOB elevated with bed alarm upon OT entry to room.    General Precautions: Standard, fall  Orthopedic Precautions: N/A  Braces: N/A  Respiratory Status: Room air     Occupational Performance:    Bed Mobility:    Patient completed Scooting/Bridging with modified independence  Patient completed Supine to Sit with modified independence    Functional Mobility/Transfers:  Patient completed Sit <> Stand Transfer with modified independence  with  no assistive device   Patient completed Bed <> Chair Transfer using Step Transfer technique with modified independence with no assistive device  Functional Mobility: pt able to walk within room with no DME with mod I    Activities of Daily Living:  Feeding:  modified independence    Grooming: modified independence    Lower Body Dressing: modified independence      Cognitive/Visual Perceptual:  Cognitive/Psychosocial Skills:     -       Oriented to: Person, Place, Time, and Situation   -       Follows Commands/attention:Follows two-step commands  -       Communication: clear/fluent  -       Memory: No Deficits noted  -       Safety awareness/insight to disability: intact   -       Mood/Affect/Coping skills/emotional control: Appropriate to situation    Physical Exam:  Balance: -       sitting = good;  standing with no DME = good  Postural examination/scapula alignment:    -       No postural abnormalities identified  Skin integrity: Visible skin intact  Edema:  None noted  Sensation: -       Intact  Motor Planning: -       fine motor slightly delayed in L hand - improved with repetition  Dominant hand: -       right  Upper Extremity Range of Motion:     Upper Extremity Strength:     Strength:    Fine Motor Coordination: -       Impaired  Left hand, finger to nose slightly off center (Right = good) and Left hand thumb/finger opposition skills delayed on digits 3-5  Gross motor coordination: drift on LUE after 20 seconds    AMPAC 6 Click ADL:  AMPAC Total Score:  22    Treatment & Education:  Pt completed ADLs and func mobility activities for tx session as noted above  Pt noted with some difficulty with L FM coordination/speed, but this did not interfere with ADLS - instructed pt and wife on exercises and activities to improve fluidity in LUE  Pt educated on role of OT and POC      Patient left up in chair with call button in reach, nurse notified, and wife present    GOALS:   Multidisciplinary Problems       Occupational Therapy Goals       Not on file              Multidisciplinary Problems (Resolved)          Problem: Occupational Therapy    Goal Priority Disciplines Outcome Interventions   Occupational Therapy Goal   (Resolved)     OT, PT/OT Met                        History:     Past Medical History:   Diagnosis Date    Anticoagulant long-term use     Anxiety 07/29/2014    Arthritis     atrial fibrillation     Bilateral diabetic retinopathy 2017    Cardioembolic stroke 12/7/2024    CKD (chronic kidney disease) stage 2, GFR 60-89 ml/min 12/28/2016    CKD (chronic kidney disease) stage 4, GFR 15-29 ml/min 07/29/2014    Colon polyps 2014    Depression - situational 07/29/2014    Diabetes mellitus     Diabetes type 2 since 2000 07/29/2014    Diabetic neuropathy 07/29/2014    Encounter for blood transfusion     History of cardioversion 10/03/2019    History of hepatitis C, s/p successful Rx w/ SVR24 - 9/2017 07/29/2014    Genotype 1a, treatment naive 10/2014 liver biopsy - grade 1 / stage 1 Completed Harvoni w/ SVR    Hyperlipidemia 07/29/2014    Hypertension     Neuropathy     Organ transplant candidate 07/29/2014    Pancreatitis     S/P cadaveric kidney transplant 11/5/2016. ESRD secondary to HTN/DMII 11/05/2016    Stage 3a chronic kidney disease 12/28/2016    Type 2 diabetes mellitus with stage 3a chronic kidney disease, with long-term current use of insulin 07/29/2014         Past Surgical History:   Procedure Laterality Date    ABLATION OF ARRHYTHMOGENIC FOCUS FOR  ATRIAL FIBRILLATION N/A 6/11/2024    Procedure: Ablation atrial fibrillation;  Surgeon: Bronson Bowden MD;  Location: [facility] EP LAB;  Service: Cardiology;  Laterality: N/A;  AF, FELICIANO (cx if SR), PVI, RFA, Carto, Gen, GP, 3 Prep    ABLATION, ATRIAL FLUTTER, TYPICAL  6/11/2024    Procedure: Ablation, Atrial Flutter, Typical;  Surgeon: Bronson Bowden MD;  Location: [facility] EP LAB;  Service: Cardiology;;    APPENDECTOMY      BONE MARROW BIOPSY Left 6/26/2024    Procedure: Biopsy-bone marrow;  Surgeon: Bridger Zapata MD;  Location: [facility] ENDO (4TH FLR);  Service: Oncology;  Laterality: Left;  6/24-pt knows to hold aspirin and eliquis as instructed by hem/onc, precall complete-Kpvt    CARDIOVERSION  6/11/2024    Procedure: Cardioversion;  Surgeon: Bronson Bowden MD;  Location: [facility] EP LAB;  Service: Cardiology;;    CATARACT EXTRACTION W/  INTRAOCULAR LENS IMPLANT Right 3/13/2024    Procedure: EXTRACTION, CATARACT, WITH IOL INSERTION;  Surgeon: Jennifer Palacio MD;  Location: [facility] OR;  Service: Ophthalmology;  Laterality: Right;    CATARACT EXTRACTION W/  INTRAOCULAR LENS IMPLANT Left 4/10/2024    Procedure: EXTRACTION, CATARACT, WITH IOL INSERTION;  Surgeon: Jennifer Palacio MD;  Location: [facility] OR;  Service: Ophthalmology;  Laterality: Left;    COLONOSCOPY N/A 12/21/2020    Procedure: COLONOSCOPY;  Surgeon: Tico Bell MD;  Location: [facility] ENDO (4TH FLR);  Service: Endoscopy;  Laterality: N/A;  ok to hold eliquis per Dr. Valadez, see telephone encounter 11/13/2020-MS  covid test 12/18 [place]    KIDNEY TRANSPLANT      TRANSESOPHAGEAL ECHOCARDIOGRAPHY N/A 8/26/2019    Procedure: ECHOCARDIOGRAM, TRANSESOPHAGEAL;  Surgeon: [facility];  Location: [facility] EP LAB;  Service: Cardiology;  Laterality: N/A;    TRANSESOPHAGEAL ECHOCARDIOGRAPHY N/A 6/11/2024    Procedure: ECHOCARDIOGRAM, TRANSESOPHAGEAL;  Surgeon: Grayson Lin III, MD;  Location: [facility] EP LAB;  Service: Cardiology;  Laterality: N/A;     TREATMENT OF CARDIAC ARRHYTHMIA N/A 8/26/2019    Procedure: CARDIOVERSION;  Surgeon: Bronson Bowden MD;  Location: [facility] LAB;  Service: Cardiology;  Laterality: N/A;  AF, FELICIANO, DCCV, MAC, GP, 3 PREP       Time Tracking:     OT Date of Treatment: 12/08/24  OT Start Time: 0908  OT Stop Time: 0930  OT Total Time (min): 22 min    Billable Minutes:Evaluation 10  Therapeutic Activity 12    12/8/2024

## 2024-12-08 NOTE — ASSESSMENT & PLAN NOTE
A1C 8.1 (compared to previous from 4 weeks ago on 7.4). patient educated on the importance of medication/insulin compliance at home.     -MDDSI   -POCG Q6H    -Recommend outpatient diabetic education when medically ready

## 2024-12-08 NOTE — HOSPITAL COURSE
Admitted on 12/7/2 for Embolic R MCA infarct. He was hypertensive on arrival up to , and he was started on he was gradually started on his home BP. His only deficit since admission was LUE weakness and was seen by PT/OT who did not recommend any therapy. He was started back on his coreg and lasix on 12/7 and BP down to , that per chart review is his normal range on his OP visits. His tacrolimus was held DOA given supra therapeutic level 12/6, that on repeat was normal. Plan to resume tomorrow and get repeat level on Wednesday. ECHO completed that showed worsening EF 45-50% from 50-55% on last ECHO on 6/2024. Patient is not in decompensated heart failure at the moment and he is already on a BB and lasix. He has appointment scheduled with cardiology on 1/8/2025 but message sent to try to move it up sooner as well as PCP follow up in 1 week. We discussed the digital medicine BP program, that he will discuss with his PCP as well. Patient is medically stable to discharge home.

## 2024-12-08 NOTE — ASSESSMENT & PLAN NOTE
Continue home xarelto. Etiology of symptoms suspected to be cardioembolic.   -Pending official echo report

## 2024-12-08 NOTE — ASSESSMENT & PLAN NOTE
Mr. Ravi Zamorano is a 69 y.o. patient admitted for LSW. History of afib on Xarelto, HTN closely managed by cardiology, DM2, CKD3 s/p kidney transplant in 2016. Initially presented with acute LSW without any falls. LKN ~11PM PTA. NIHSS 1 with minor left sided facial paralysis (at baseline), CTH negative. MRA brain, head and neck negative. MRI brain with right cortical hyperintensity on DWI. No indications of any acute intervention. Patient back at baseline. Persisted hypertensive overnight but within goal. Resumed home coreg in addition to lasix to improve volume status.     Current etiology of symptoms suspected to be from embolic/cardioembolic in the setting of afib and LA dilation; pending final echo report.     Plan   -home xarelto   - Q4H Neuro checks, VS  - SBP goal <220  - PRN labetalol with parameters   - Na goal >135  - Maintain euvolemia  - Hgb A1c, TSH, lipid panel  - Daily CBC, CMP  - PT/OT consulted for evaluation    Antithrombotics for secondary stroke prevention: Antiplatelets: None: Restarted on home Xarelto. Loaded with  in the ED.     Statins for secondary stroke prevention and hyperlipidemia, if present:   Statins: Atorvastatin- 40 mg daily    Aggressive risk factor modification: HTN, DM, HLD, A-Fib     Rehab efforts: The patient has been evaluated by a stroke team provider and the therapy needs have been fully considered based off the presenting complaints and exam findings. The following therapy evaluations are needed: PT evaluate and treat, OT evaluate and treat, SLP evaluate and treat, PM&R evaluate for appropriate placement    Diagnostics ordered/pending: Pending official echo report     VTE prophylaxis: None: Reason for No Pharmacological VTE Prophylaxis: Currently on anticoagulation    BP parameters: Infarct: No intervention, SBP <220

## 2024-12-08 NOTE — ASSESSMENT & PLAN NOTE
S/p kidney transplant on 2016. KTM consulted and recommend to continue holding tacro on 12/8/24 and repeat levels the next day   -continue home mycophenolate   -continue home prednisone   -AM tacro levels on 12/9/24  -KTM following

## 2024-12-08 NOTE — PROGRESS NOTES
Arvind Jay - Neurosurgery ([facility]  Vascular Neurology  Comprehensive Stroke Center  Progress Note    Assessment/Plan:     * Cardioembolic stroke  Mr. Ravi Zamorano is a 69 y.o. patient admitted for LSW. History of afib on Xarelto, HTN closely managed by cardiology, DM2, CKD3 s/p kidney transplant in 2016. Initially presented with acute LSW without any falls. LKN ~11PM PTA. NIHSS 1 with minor left sided facial paralysis (at baseline), CTH negative. MRA brain, head and neck negative. MRI brain with right cortical hyperintensity on DWI. No indications of any acute intervention. Patient back at baseline. Persisted hypertensive overnight but within goal. Resumed home coreg in addition to lasix to improve volume status.     Current etiology of symptoms suspected to be from embolic/cardioembolic in the setting of afib and LA dilation; pending final echo report.     Plan   -home xarelto   - Q4H Neuro checks, VS  - SBP goal <220  - PRN labetalol with parameters   - Na goal >135  - Maintain euvolemia  - Hgb A1c, TSH, lipid panel  - Daily CBC, CMP  - PT/OT consulted for evaluation    Antithrombotics for secondary stroke prevention: Antiplatelets: None: Restarted on home Xarelto. Loaded with  in the ED.     Statins for secondary stroke prevention and hyperlipidemia, if present:   Statins: Atorvastatin- 40 mg daily    Aggressive risk factor modification: HTN, DM, HLD, A-Fib     Rehab efforts: The patient has been evaluated by a stroke team provider and the therapy needs have been fully considered based off the presenting complaints and exam findings. The following therapy evaluations are needed: PT evaluate and treat, OT evaluate and treat, SLP evaluate and treat, PM&R evaluate for appropriate placement    Diagnostics ordered/pending: Pending official echo report     VTE prophylaxis: None: Reason for No Pharmacological VTE Prophylaxis: Currently on anticoagulation    BP parameters: Infarct: No intervention, SBP  <220    CHF (congestive heart failure)    Echo    Result Date: 6/16/2024    Left Ventricle: The left ventricle is normal in size. Ventricular mass   is normal. Mild septal thickening. Regional wall motion abnormalities   present. See diagram for wall motion findings. Septal motion is abnormal.   There is normal systolic function with a visually estimated ejection   fraction of 55 - 60%. Ejection fraction by visual approximation is 58%.    Right Ventricle: Normal right ventricular cavity size. Wall thickness   is normal. Systolic function is normal.    Tricuspid Valve: There is mild regurgitation.    Pulmonary Artery: There is severe pulmonary hypertension. The estimated   pulmonary artery systolic pressure is 72 mmHg.    IVC/SVC: Normal venous pressure at 3 mmHg.    Pericardium: There is a small posterior effusion at base and none to   trivial otherwise.        Transesophageal echo (FELICIANO)    Result Date: 6/11/2024    FELICIANO prior to ablation. No evidence of intracardiac thrombus.    Left Ventricle: The left ventricle is normal in size. Normal wall   thickness. There is low normal systolic function with a visually estimated   ejection fraction of 50 - 55%.    Right Ventricle: Normal right ventricular cavity size. Wall thickness   is normal. Systolic function is normal.    Left Atrium: Left atrium is dilated. The left atrial appendage appears   normal. The left atrial appendage has a windsock morphology. Appendage   velocity is reduced at less than 40 cm/sec. The pulmonary veins have   systolic blunting. There is no thrombus in the cavity.    Right Atrium: Right atrium is dilated.    Mitral Valve: There is mild regurgitation.    Tricuspid Valve: There is mild regurgitation.    Aorta: Grade 3 atherosclerosis of the descending aorta and in the   aortic arch with moderate thickening.    Pericardium: There is a small lateral effusion.        Echo    Result Date: 5/3/2024    Irregularly irregular rhythm was present during the  study    Left Ventricle: The left ventricle is normal in size. Ventricular mass   is normal. Normal wall thickness. Normal wall motion. There is low normal   systolic function with a visually estimated ejection fraction of 50 - 55%.   Unable to assess diastolic function due to atrial fibrillation.   Singnificant beat to beat variabilty due  to AF.    Left Ventricle: Unable to assess diastolic function due to atrial   fibrillation. Normal left ventricular filling pressure.    Right Ventricle: Normal right ventricular cavity size. Wall thickness   is normal. Systolic function is normal.    Left Atrium: Left atrium is severely dilated.    Right Atrium: Right atrium is mildly dilated.    Aortic Valve: The aortic valve is a bicuspid valve. There is mild   aortic valve sclerosis. There is mild annular calcification present. There   is mild aortic regurgitation.    Mitral Valve: There is mild regurgitation.    Aorta: Aortic root is mildly dilated measuring 3.69 cm. Ascending aorta   is normal measuring 3.44 cm.    Pulmonary Artery: The estimated pulmonary artery systolic pressure is   42 mmHg.    IVC/SVC: Intermediate venous pressure at 8 mmHg.    Pericardium: There is a small posterior effusion. No indication of   cardiac tamponade. Left pleural effusion.        -Resumed home Lasix 80 BID  -Strict I/Os    Peripheral neuropathy  Continue home gabapentin; will monitor kidney function daily while on the medication as well since PHUONG on CKD.    Paroxysmal A-fib  Continue home xarelto. Etiology of symptoms suspected to be cardioembolic.   -Pending official echo report    S/P cadaveric kidney transplant 11/5/2016. ESRD secondary to HTN/DMII  S/p kidney transplant on 2016. KTM consulted and recommend to continue holding tacro on 12/8/24 and repeat levels the next day   -continue home mycophenolate   -continue home prednisone   -AM tacro levels on 12/9/24  -KTM following     Hyperlipidemia  continue home atorvastatin 40      Hypertension  Hypertensive in the ED with SBP on the 220s. Received labetalol 10 x 2 while in the ED.   will allow BP to get in the 200's range but after 24 hrs will start introducing meds to obtain better BP control with management in house.     -continue home valsartan 320   -continue home hydralazine 100mg PO Q8H   -resumed home coreg 25 on 12/8/24  -PRN labetalol with parameters  -SBP < 220  -Recommend digital hypertension program as an outpatient once medically ready for discharge       Type 2 diabetes mellitus with stage 3a chronic kidney disease, with long-term current use of insulin  A1C 8.1 (compared to previous from 4 weeks ago on 7.4). patient educated on the importance of medication/insulin compliance at home.     -MDDSI   -POCG Q6H    -Recommend outpatient diabetic education when medically ready          12/08/2024 persists hypertensive since admission, now on the 180s. No PRNs given overnight. Resumed home coreg to optimize BP control. KTM following with regular tacro levels, which were high on admission, recommend to continue holding tacro. Hb1Ac not controlled (now on 8.1) compared from prior of 7.4 (4 weeks ago). Educated patient on diabetes management at home. Will consider outpatient digital HTN program follow up and outpatient diabetic education once medically ready.     STROKE DOCUMENTATION        NIH Scale:  1a. Level of Consciousness: 0-->Alert, keenly responsive  1b. LOC Questions: 0-->Answers both questions correctly  1c. LOC Commands: 0-->Performs both tasks correctly  2. Best Gaze: 0-->Normal  3. Visual: 0-->No visual loss  4. Facial Palsy: 1-->Minor paralysis (flattened nasolabial fold, asymmetry on smiling) (baseline left facial weakness)  5a. Motor Arm, Left: 0-->No drift, limb holds 90 (or 45) degrees for full 10 secs  5b. Motor Arm, Right: 0-->No drift, limb holds 90 (or 45) degrees for full 10 secs  6a. Motor Leg, Left: 0-->No drift, leg holds 30 degree position for full 5  secs  6b. Motor Leg, Right: 0-->No drift, leg holds 30 degree position for full 5 secs  7. Limb Ataxia: 0-->Absent  8. Sensory: 0-->Normal, no sensory loss  9. Best Language: 0-->No aphasia, normal  10. Dysarthria: 0-->Normal  11. Extinction and Inattention (formerly Neglect): 0-->No abnormality  Total (NIH Stroke Scale): 1       Modified Shane Score: 0  Boris Coma Scale:    ABCD2 Score:    [id number] Score:   HAS -BLED Score:   ICH Score:   Hunt & Bustillo Classification:      Hemorrhagic change of an Ischemic Stroke: Does this patient have an ischemic stroke with hemorrhagic changes? No     Neurologic Chief Complaint: R cortical infarct/cardioembolic source     Subjective:     Interval History: Patient is seen for follow-up neurological assessment and treatment recommendations: See hospital course     HPI, Past Medical, Family, and Social History remains the same as documented in the initial encounter.     Review of Systems   Neurological:  Positive for facial asymmetry and weakness. Negative for numbness and headaches.     Scheduled Meds:   atorvastatin  40 mg Oral Daily    carvediloL  25 mg Oral BID    furosemide  80 mg Oral BID    gabapentin  300 mg Oral QHS    hydrALAZINE  100 mg Oral Q8H    mycophenolate  500 mg Oral BID    predniSONE  5 mg Oral Daily    rivaroxaban  15 mg Oral Daily with dinner    valsartan  320 mg Oral QHS     Continuous Infusions:  PRN Meds:  Current Facility-Administered Medications:     bisacodyL, 10 mg, Rectal, Daily PRN    dextrose 10%, 12.5 g, Intravenous, PRN    dextrose 10%, 25 g, Intravenous, PRN    glucagon (human recombinant), 1 mg, Intramuscular, PRN    insulin aspart U-100, 0-10 Units, Subcutaneous, Q6H PRN    labetalol, 10 mg, Intravenous, Q15 Min PRN    ondansetron, 8 mg, Oral, Q8H PRN    polyethylene glycol, 17 g, Oral, Daily PRN    sodium chloride 0.9%, 500 mL, Intravenous, PRN    sodium chloride 0.9%, 10 mL, Intravenous, PRN    Objective:     Vital Signs (Most  Recent):  Temp: 97.6 °F (36.4 °C) (12/08/24 1149)  Pulse: (!) 53 (12/08/24 1149)  Resp: 16 (12/08/24 1149)  BP: (!) 186/80 (12/08/24 1149)  SpO2: 97 % (12/08/24 1149)  BP Location: Right arm    Vital Signs Range (Last 24H):  Temp:  [97.4 °F (36.3 °C)-98.2 °F (36.8 °C)]   Pulse:  [53-75]   Resp:  [16-18]   BP: (185-228)/()   SpO2:  [94 %-97 %]   BP Location: Right arm       Physical Exam  Vitals reviewed.   Neurological:      Mental Status: He is alert.      Cranial Nerves: No cranial nerve deficit.      Sensory: No sensory deficit.      Motor: Weakness present.      Comments: See neurological exam               Neurological Exam:   LOC: alert  Attention Span: Good   Language: No aphasia  Articulation: No dysarthria  Orientation: Person, Place, Time   Visual Fields: Full  EOM (CN III, IV, VI): Full/intact  Facial Movement (CN VII): Lower facial weakness on the Left  Motor: Arm left  Paresis: 3/5  Leg left  Paresis: 4/5  Arm right  Normal 5/5  Leg right Normal 5/5  Tone: Normal tone throughout    Laboratory:  CMP:   Recent Labs   Lab 12/08/24  0551   CALCIUM 8.4*   ALBUMIN 2.5*   PROT 5.0*      K 4.5   CO2 22*      BUN 31*   CREATININE 2.2*   ALKPHOS 51   ALT <5*   AST 9*   BILITOT 0.3     CBC:   Recent Labs   Lab 12/08/24  0550   WBC 5.44   RBC 4.61   HGB 10.5*   HCT 36.1*   *   MCV 78*   MCH 22.8*   MCHC 29.1*     Hgb A1C:   Recent Labs   Lab 12/07/24  1732   HGBA1C 8.1*     TSH:   Recent Labs   Lab 12/07/24  0629   TSH 1.738       Diagnostic Results      Brain Imaging   MRI Brain   8 mm focus of restricted diffusion within the white matter of the right frontal lobe subcortically consistent with an acute infarct.  No other acute infarct is identified within the remainder of the brain parenchyma.     No hydrocephalus mass effect or intracranial hemorrhage.     Increased signal within the periventricular and subcortical white matter is nonspecific but may reflect mild approaching moderate  chronic small vessel ischemic change.  Chronic right thalamic lacunar infarct.     Punctate chronic hemosiderin staining left frontal subcortical white matter.     Minimal right maxillary mucosal thickening.  The mastoid air cells are essentially clear.     Impression:     Subcentimeter acute infarct subcortical white matter right frontal lobe.  No intracranial hemorrhage or mass effect.     CTH     Blood: No acute intracranial hemorrhage.     Parenchyma: No definite loss of gray-white differentiation to suggest acute or subacute transcortical infarct. There is supratentorial primarily periventricular hypoattenuation, nonspecific.     Ventricles/Extra-axial spaces: No abnormal extra-axial fluid collection. Basal cisterns are patent.     Vessels: Severe intracranial vascular calcifications     Orbits: Unremarkable.     Scalp: Unremarkable.     Skull: There are no depressed skull fractures or destructive bone lesions.     Sinuses and mastoids: Essentially clear.     Other findings: None     Cervical spine:     Fractures: No acute fractures     Alignment: There is no significant vertebral subluxation  Atlanto-axial and atlanto-occipital joints: Atlanto-axial and atlanto-occipital intervals are not widened.  Facet joints: There is no traumatic facet joint widening.  Vertebral bodies: Normal  Discs:  Spinal canal and foraminal narrowing: Although CT does not optimally evaluate the soft tissue contents of the spinal canal and foramina, no critical stenosis is suggested.  Paraspinal soft tissues: Unremarkable.     Upper Lungs:Bilateral pleural effusions are present.     Impression:     1. No acute intracranial abnormality.  2. No fracture or subluxation of the cervical spine.    Vessel Imaging   MRA Brain  no major branch advanced stenosis/occlusion at the [id number].     No evidence of aneurysm or AVM.     Cardiac Evaluation:   Done. Pending official report.     Candelario Bower MD  Comprehensive Stroke  [place]  Department of Vascular Neurology   Arvind Jay - Neurosurgery ([facility])

## 2024-12-08 NOTE — ASSESSMENT & PLAN NOTE
Hypertensive in the ED with SBP on the 220s. Received labetalol 10 x 2 while in the ED.   will allow BP to get in the 200's range but after 24 hrs will start introducing meds to obtain better BP control with management in house.     -continue home valsartan 320   -continue home hydralazine 100mg PO Q8H   -resumed home coreg 25 on 12/8/24  -PRN labetalol with parameters  -SBP < 220  -Recommend digital hypertension program as an outpatient once medically ready for discharge

## 2024-12-08 NOTE — PLAN OF CARE
Problem: Occupational Therapy  Goal: Occupational Therapy Goal  Outcome: Met       Pt was agreeable to and participated in OT evaluation.  Pt reports that he lives with his wife and was independent with mobility and ADLS at [facility].  Pt completed his evaluation and noted to perform all tasks with mod I.  Some mild fine motor coordination deficits were noted during his evaluation, but did not impede on his performance level with ADLS as he is right hand dominant.  Pt and wife were provided with education on exercises / activities he may work on to improve the overall fine motor coordination and speed in his L hand at discharge.  Pt will have assistance/supervision as needed once discharged home.  Due to these factors, pt is discharged from OT services.  No DME or post acute OT required at this time.     Megan Bernal, OT  12/8/2024

## 2024-12-08 NOTE — CONSULTS
Food & Nutrition  Education    Diet Education: Stroke  Time Spent: 10 min   Learners: Patient      Nutrition Education provided with handouts: Low-Sodium, Heart Healthy      Comments: Discussed importance of eating a balanced diet with whole grains, fruits and vegetables, and lean protein sources. Encouraged heart-healthy unsaturated fats while limiting saturated fats, trans fats, and cholesterol intake. Reviewed high sodium foods that should be avoided. Food labels, salt free seasonings, and recommended sodium intake reviewed. Educated patient on managing blood pressure and eating out. All questions/concerns were addressed.       All questions and concerns answered. Dietitian's contact information provided.       Follow-Up: 12/10/24       Please Re-consult as needed        Thanks!    Argelia Fam, Registration Eligible, Provisional LDN

## 2024-12-08 NOTE — PT/OT/SLP EVAL
Speech Language Pathology Evaluation  Bedside Swallow  And Discharge Summary    Patient Name:  Ravi Zamorano   MRN:  [id number]  Admitting Diagnosis: Cardioembolic stroke    Recommendations:                 General Recommendations:  Follow-up not indicated  Diet recommendations:  Regular Diet - IDDSI Level 7, Thin liquids - IDDSI Level 0   Aspiration Precautions: Standard aspiration precautions   General Precautions: Standard,    Communication strategies:  none    Assessment:     Ravi Zamorano is a 69 y.o. male with an SLP diagnosis of  functional swallow and cognitive communication skills at baseline, no deficits identified .  ST to sign off at this time.    Past Medical History:   Diagnosis Date    Anticoagulant long-term use     Anxiety 07/29/2014    Arthritis     atrial fibrillation     Bilateral diabetic retinopathy 2017    Cardioembolic stroke 12/7/2024    CKD (chronic kidney disease) stage 2, GFR 60-89 ml/min 12/28/2016    CKD (chronic kidney disease) stage 4, GFR 15-29 ml/min 07/29/2014    Colon polyps 2014    Depression - situational 07/29/2014    Diabetes mellitus     Diabetes type 2 since 2000 07/29/2014    Diabetic neuropathy 07/29/2014    Encounter for blood transfusion     History of cardioversion 10/03/2019    History of hepatitis C, s/p successful Rx w/ SVR24 - 9/2017 07/29/2014    Genotype 1a, treatment naive 10/2014 liver biopsy - grade 1 / stage 1 Completed Harvoni w/ SVR    Hyperlipidemia 07/29/2014    Hypertension     Neuropathy     Organ transplant candidate 07/29/2014    Pancreatitis     S/P cadaveric kidney transplant 11/5/2016. ESRD secondary to HTN/DMII 11/05/2016    Stage 3a chronic kidney disease 12/28/2016    Type 2 diabetes mellitus with stage 3a chronic kidney disease, with long-term current use of insulin 07/29/2014       Past Surgical History:   Procedure Laterality Date    ABLATION OF ARRHYTHMOGENIC FOCUS FOR ATRIAL FIBRILLATION N/A 6/11/2024    Procedure: Ablation atrial  fibrillation;  Surgeon: Bronson Bowden MD;  Location: [facility] EP LAB;  Service: Cardiology;  Laterality: N/A;  AF, FELICIANO (cx if SR), PVI, RFA, Carto, Gen, GP, 3 Prep    ABLATION, ATRIAL FLUTTER, TYPICAL  6/11/2024    Procedure: Ablation, Atrial Flutter, Typical;  Surgeon: Bronson Bowden MD;  Location: [facility] EP LAB;  Service: Cardiology;;    APPENDECTOMY      BONE MARROW BIOPSY Left 6/26/2024    Procedure: Biopsy-bone marrow;  Surgeon: Bridger Zapata MD;  Location: [facility] ENDO (4TH FLR);  Service: Oncology;  Laterality: Left;  6/24-pt knows to hold aspirin and eliquis as instructed by hem/onc, precall complete-Kpvt    CARDIOVERSION  6/11/2024    Procedure: Cardioversion;  Surgeon: Bronson Bowden MD;  Location: [facility] EP LAB;  Service: Cardiology;;    CATARACT EXTRACTION W/  INTRAOCULAR LENS IMPLANT Right 3/13/2024    Procedure: EXTRACTION, CATARACT, WITH IOL INSERTION;  Surgeon: Jennifer Palacio MD;  Location: [facility] OR;  Service: Ophthalmology;  Laterality: Right;    CATARACT EXTRACTION W/  INTRAOCULAR LENS IMPLANT Left 4/10/2024    Procedure: EXTRACTION, CATARACT, WITH IOL INSERTION;  Surgeon: Jennifer Palacio MD;  Location: [facility] OR;  Service: Ophthalmology;  Laterality: Left;    COLONOSCOPY N/A 12/21/2020    Procedure: COLONOSCOPY;  Surgeon: Tico Bell MD;  Location: [facility] ENDO (4TH FLR);  Service: Endoscopy;  Laterality: N/A;  ok to hold eliquis per Dr. Valadez, see telephone encounter 11/13/2020-MS  covid test 12/18 [place]    KIDNEY TRANSPLANT      TRANSESOPHAGEAL ECHOCARDIOGRAPHY N/A 8/26/2019    Procedure: ECHOCARDIOGRAM, TRANSESOPHAGEAL;  Surgeon: Dolores Diagnostic Provider;  Location: [facility] EP LAB;  Service: Cardiology;  Laterality: N/A;    TRANSESOPHAGEAL ECHOCARDIOGRAPHY N/A 6/11/2024    Procedure: ECHOCARDIOGRAM, TRANSESOPHAGEAL;  Surgeon: Grayson Lin III, MD;  Location: [facility] EP LAB;  Service: Cardiology;  Laterality: N/A;    TREATMENT OF CARDIAC ARRHYTHMIA N/A 8/26/2019    Procedure:  CARDIOVERSION;  Surgeon: Bronson Bowden MD;  Location: [facility] EP LAB;  Service: Cardiology;  Laterality: N/A;  AF, FELICIANO, DCCV, MAC, GP, 3 PREP       Social History: Patient lives with spouse.    Prior Intubation HX:  none this admission    Modified Barium Swallow: n/a    Chest X-Rays: 12/7- Possible right basilar airspace disease versus artifact. If real, this would be concerning for pneumonia. Clinical correlation. Repeat PA and lateral chest radiographs at full inspiration could be considered for further evaluation.     Prior diet: regular    Subjective     Spoke with RN prior to entering pt's room . Pt seen bedside for session. Alert and agreeable to ST. Pt Ox4 IND. Expressed complex thoughts and ideas without difficulty. Followed commands with 100% acc. Recalled recent events without cues.       Pain/Comfort:  Pain Rating 1: 0/10  Pain Rating Post-Intervention 1: 0/10    Respiratory Status: Room air    Objective:     Oral Musculature Evaluation  Oral Musculature: WFL  Dentition: present and adequate  Secretion Management: adequate  Mucosal Quality: adequate  Oral Labial Strength and Mobility: WFL  Lingual Strength and Mobility: WFL  Volitional Cough: WFL  Volitional Swallow: WFL  Voice Prior to PO Intake: WFL    Bedside Swallow Eval:   Consistencies Assessed:  Thin liquids via consecutive straw sips  Solids crackers      Oral Phase:   WFL    Pharyngeal Phase:   no overt clinical signs/symptoms of aspiration  no overt clinical signs/symptoms of pharyngeal dysphagia    Compensatory Strategies  None    Treatment: Provided education to patient and spouse re: role of ST,  POC, swallow precautions, recommendations for regular diet with  thin  liquids, and plan to d/c ST services as pt at baseline. They verbalized understanding. White board updated. RN and MD notified of results and recs. At end of session, pt remained bedside with call light and all needs in reach.       Goals:   Multidisciplinary Problems       SLP  Goals       Not on file                    Plan:     Patient to be seen:      Plan of Care expires:     Plan of Care reviewed with:  patient, spouse   SLP Follow-Up:  No       Discharge recommendations:  No Therapy Indicated   Barriers to Discharge:  None    Time Tracking:     SLP Treatment Date:   12/08/24  Speech Start Time:  0853  Speech Stop Time:  0901     Speech Total Time (min):  8 min    Billable Minutes: Eval Swallow and Oral Function 8    12/08/2024

## 2024-12-08 NOTE — ASSESSMENT & PLAN NOTE
Mr. Ravi Zamorano is a 69 y.o. patient admitted for LSW. History of afib on Xarelto, HTN closely managed by cardiology, DM2, CKD3 s/p kidney transplant in 2016. Initially presented with acute LSW without any falls. LKN ~11PM PTA. NIHSS 1 with minor left sided facial paralysis (at baseline), CTH negative. MRA brain, head and neck negative. MRI brain with right cortical hyperintensity on DWI. No indications of any acute intervention. Patient back at baseline. Persisted hypertensive overnight but within goal. Resumed home coreg in addition to lasix to improve volume status.     Current etiology of symptoms suspected to be from embolic/cardioembolic in the setting of afib. No need to change anticoagulation. ECHO w worsening EF 45-50%. Patient is not in decompensated HF, will need closer OP follow up with cardiology.       Antithrombotics for secondary stroke prevention: Antiplatelets: None: Restarted on home Xarelto. Loaded with  in the ED.     Statins for secondary stroke prevention and hyperlipidemia, if present:   Statins: Atorvastatin- 40 mg daily    Aggressive risk factor modification: HTN, DM, HLD, A-Fib     Rehab efforts: The patient has been evaluated by a stroke team provider and the therapy needs have been fully considered based off the presenting complaints and exam findings. The following therapy evaluations are needed: PT evaluate and treat, OT evaluate and treat, SLP evaluate and treat, PM&R evaluate for appropriate placement    Diagnostics ordered/pending: Pending official echo report     VTE prophylaxis: None: Reason for No Pharmacological VTE Prophylaxis: Currently on anticoagulation    BP parameters: Infarct: No intervention, SBP <220

## 2024-12-08 NOTE — ASSESSMENT & PLAN NOTE
Echo    Result Date: 6/16/2024    Left Ventricle: The left ventricle is normal in size. Ventricular mass   is normal. Mild septal thickening. Regional wall motion abnormalities   present. See diagram for wall motion findings. Septal motion is abnormal.   There is normal systolic function with a visually estimated ejection   fraction of 55 - 60%. Ejection fraction by visual approximation is 58%.    Right Ventricle: Normal right ventricular cavity size. Wall thickness   is normal. Systolic function is normal.    Tricuspid Valve: There is mild regurgitation.    Pulmonary Artery: There is severe pulmonary hypertension. The estimated   pulmonary artery systolic pressure is 72 mmHg.    IVC/SVC: Normal venous pressure at 3 mmHg.    Pericardium: There is a small posterior effusion at base and none to   trivial otherwise.        Transesophageal echo (FELICIANO)    Result Date: 6/11/2024    FELICIANO prior to ablation. No evidence of intracardiac thrombus.    Left Ventricle: The left ventricle is normal in size. Normal wall   thickness. There is low normal systolic function with a visually estimated   ejection fraction of 50 - 55%.    Right Ventricle: Normal right ventricular cavity size. Wall thickness   is normal. Systolic function is normal.    Left Atrium: Left atrium is dilated. The left atrial appendage appears   normal. The left atrial appendage has a windsock morphology. Appendage   velocity is reduced at less than 40 cm/sec. The pulmonary veins have   systolic blunting. There is no thrombus in the cavity.    Right Atrium: Right atrium is dilated.    Mitral Valve: There is mild regurgitation.    Tricuspid Valve: There is mild regurgitation.    Aorta: Grade 3 atherosclerosis of the descending aorta and in the   aortic arch with moderate thickening.    Pericardium: There is a small lateral effusion.        Echo    Result Date: 5/3/2024    Irregularly irregular rhythm was present during the study    Left Ventricle: The left  ventricle is normal in size. Ventricular mass   is normal. Normal wall thickness. Normal wall motion. There is low normal   systolic function with a visually estimated ejection fraction of 50 - 55%.   Unable to assess diastolic function due to atrial fibrillation.   Singnificant beat to beat variabilty due  to AF.    Left Ventricle: Unable to assess diastolic function due to atrial   fibrillation. Normal left ventricular filling pressure.    Right Ventricle: Normal right ventricular cavity size. Wall thickness   is normal. Systolic function is normal.    Left Atrium: Left atrium is severely dilated.    Right Atrium: Right atrium is mildly dilated.    Aortic Valve: The aortic valve is a bicuspid valve. There is mild   aortic valve sclerosis. There is mild annular calcification present. There   is mild aortic regurgitation.    Mitral Valve: There is mild regurgitation.    Aorta: Aortic root is mildly dilated measuring 3.69 cm. Ascending aorta   is normal measuring 3.44 cm.    Pulmonary Artery: The estimated pulmonary artery systolic pressure is   42 mmHg.    IVC/SVC: Intermediate venous pressure at 8 mmHg.    Pericardium: There is a small posterior effusion. No indication of   cardiac tamponade. Left pleural effusion.        On lasix 40 BID at home. Will hold in the acute setting while on permissive hypertension.

## 2024-12-08 NOTE — PLAN OF CARE
Problem: Stroke, Ischemic (Includes Transient Ischemic Attack)  Goal: Optimal Coping  Outcome: Progressing  Goal: Effective Bowel Elimination  Outcome: Progressing  Goal: Optimal Cerebral Tissue Perfusion  Outcome: Progressing  Goal: Optimal Cognitive Function  Outcome: Progressing  Goal: Improved Communication Skills  Outcome: Progressing  Goal: Optimal Functional Ability  Outcome: Progressing  Goal: Optimal Nutrition Intake  Outcome: Progressing  Goal: Effective Oxygenation and Ventilation  Outcome: Progressing  Goal: Improved Sensorimotor Function  Outcome: Progressing  Goal: Safe and Effective Swallow  Outcome: Progressing  Goal: Effective Urinary Elimination  Outcome: Progressing     Problem: Fall Injury Risk  Goal: Absence of Fall and Fall-Related Injury  Outcome: Progressing     Problem: Adult Inpatient Plan of Care  Goal: Plan of Care Review  Outcome: Progressing  Goal: Patient-Specific Goal (Individualized)  Outcome: Progressing  Goal: Absence of Hospital-Acquired Illness or Injury  Outcome: Progressing  Goal: Optimal Comfort and Wellbeing  Outcome: Progressing  Goal: Readiness for Transition of Care  Outcome: Progressing     Problem: Infection  Goal: Absence of Infection Signs and Symptoms  Outcome: Progressing     Problem: Diabetes Comorbidity  Goal: Blood Glucose Level Within Targeted Range  Outcome: Progressing     Problem: Wound  Goal: Optimal Coping  Outcome: Progressing  Goal: Optimal Functional Ability  Outcome: Progressing  Goal: Absence of Infection Signs and Symptoms  Outcome: Progressing  Goal: Improved Oral Intake  Outcome: Progressing  Goal: Optimal Pain Control and Function  Outcome: Progressing  Goal: Skin Health and Integrity  Outcome: Progressing  Goal: Optimal Wound Healing  Outcome: Progressing

## 2024-12-08 NOTE — PROGRESS NOTES
"KIDNEY TRANSPLANT MEDICINE PROGRESS NOTE    Please refer to detailed progress note from 12/7 for complete details of this admission.    Interval history: patient feels better, still some weakness on the left limbs     Past medical, surgical, family, social history reviewed & unchanged since admission.     I/O last 3 completed shifts:  In: -   Out: 250 [Urine:250]      Bp in the 180 to 220 in the last 18 hrs   VITALS:  height is 6' 1" (1.854 m) and weight is 86.2 kg (190 lb). His oral temperature is 97.8 °F (36.6 °C). His blood pressure is 187/90 (abnormal) and his pulse is 62. His respiration is 18 and oxygen saturation is 95%.       A&O x3, NAD.  Lungs CTA, unlabored.  Heart regular, no rub.  Abdomen soft, nontender, no masses.  Allograft nontender.  Edema: none.    Recent Labs   Lab 12/06/24  0815 12/07/24  0629 12/08/24  0550   WBC  --  5.18 5.44   HGB 10.4* 10.4* 10.5*   HCT 36.4* 35.8* 36.1*   PLT  --  114* 120*       Recent Labs   Lab 12/06/24  0815 12/07/24  0629 12/08/24  0551   * 134* 136   K 4.2 4.2 4.5    103 103   CO2 26 22* 22*   BUN 37* 35* 31*   CREATININE 2.4* 2.4* 2.2*   CALCIUM 8.5* 8.5* 8.4*   PHOS 3.1  --  3.1     Recent Labs   Lab 09/19/24  0703 09/26/24  0737 12/06/24  0815   Tacrolimus Lvl 4.8 L 4.7 L 24.0 H  24.0 H       ASSESSMENT/PLAN:    Problems/plans as noted in the referenced progress note. Additional pertinent findings:  Ckd stage 3  Creatinine improved / stable  Mild acidosis  Uncontrolled hypertension (will defer to neuro team management)   Immunosuppression : tacro level in process will address dose when level is resulted   I spoke with patient and provide update about kidney function labs      CKD post kidney transplant  -Follow with strict I/Os, daily weights, BP (goal <140/90), daily labs.    -Avoid nephrotoxic agents when feasible (NSAIDs, IV contrast dye, Aminoglycoside-containing antibiotics)  -Renally dose all appropriate medications, including antibiotics  "     Encounter for Monitoring Immunosuppression post Transplant  -Continue to trend immunosuppression levels.   -Monitor for med-related side effects or drug toxicity given immunosuppressant med with narrow therapeutic window.

## 2024-12-08 NOTE — PLAN OF CARE
[facility] - Neurosurgery (Hospital)  Discharge Final Note    Primary Care Provider: Mima Mack MD    Expected Discharge Date: 12/8/2024    Final Discharge Note (most recent)       Final Note - 12/08/24 1634          Final Note    Assessment Type Final Discharge Note     Anticipated Discharge Disposition Home or Self Care     What phone number can be called within the next 1-3 days to see how you are doing after discharge? [phone]     Hospital Resources/Appts/Education Provided Provided patient/caregiver with written discharge plan information;Appointments scheduled and added to AVS        Post-Acute Status    Coverage Payor: [employer] MEDICARE - [employer] MEDICARE HMO - CAPITATED     Patient choice form signed by patient/caregiver List with quality metrics by geographic area provided     Discharge Delays None known at this time                     Important Message from Medicare             Contact Info       Mima Mack MD   Specialty: Internal Medicine   Relationship: PCP - General    [address]  [facility] [address]   Phone: [phone]       Next Steps: Follow up in 1 week(s)          Patient marked medically ready and is agreeable to discharge. Patient to discharge to home today with no needs from case management. Patient has transportation home. Vascular Neurology scheduled all necessary follow up appointments for patient.    Future Appointments   Date Time Provider Department Center   12/9/2024  1:30 PM EPO INJECTION SCHEDULE [facility] NEPHRO [facility]   12/11/2024  8:15 AM [facility] LAB [facility]   12/19/2024  1:00 PM Francesco Lopez MD [facility] NEPHRO [facility]   1/8/2025 10:30 AM Nayely Vital PA-C [facility] CARDIO [facility]   3/7/2025 10:00 AM Mima Mack MD [facility] IM [facility]   3/27/2025  9:00 AM LAB, [facility] LTR LAB [facility]   4/3/2025  9:00 AM Karan Lopez APRN, FNP [facility] IM [facility] Hwmarcus YEEW     Kerry Bar MSW [facility]   Ext: [phone]

## 2024-12-08 NOTE — PLAN OF CARE
Problem: Physical Therapy  Goal: Physical Therapy Goal  Description: No goals established at this time- see sheyla 12/8  Outcome: Met

## 2024-12-08 NOTE — DISCHARGE SUMMARY
[facility]vind Jay - Neurosurgery ([facility])  Vascular Neurology  Comprehensive Stroke Center  Discharge Summary     Summary:     Admit Date: 12/7/2024  5:49 AM    Discharge Date and Time:  12/08/2024 4:12 PM    Attending Physician: Charleen Olmedo MD     Discharge Provider: Lucía Sharpe MD    History of Present Illness: Mr. Ravi Zamorano is a 69 y.o. patient presenting to the ED with LSW. History of CHF, HTN, HLD, CKD3 status post kidney transplant in 2016, PAD, afib on xarelto (~1 year) was on eliquis before but switched due to insurance unable to pay. Presented with LSW. LKN 11PM 12/6/24. Woke up around 10AM with LSW (UE > LE) was unable to have full  on his left hand. According to patient he took a nap and then woke up 30 minutes later with LSW. Wife at bedside complementing history.     SBP 220s. Received Labetalol x 1 in the ED and loaded with  once in the ED.               Hospital Course (synopsis of major diagnoses, care, treatment, and services provided during the course of the hospital stay): Admitted on 12/7/2 for Embolic R MCA infarct. He was hypertensive on arrival up to , and he was started on he was gradually started on his home BP. His only deficit since admission was LUE weakness and was seen by PT/OT who did not recommend any therapy. He was started back on his coreg and lasix on 12/7 and BP down to , that per chart review is his normal range on his OP visits. His tacrolimus was held DOA given supra therapeutic level 12/6, that on repeat was normal. Plan to resume tomorrow and get repeat level on Wednesday. ECHO completed that showed worsening EF 45-50% from 50-55% on last ECHO on 6/2024. Patient is not in decompensated heart failure at the moment and he is already on a BB and lasix. He has appointment scheduled with cardiology on 1/8/2025 but message sent to try to move it up sooner as well as PCP follow up in 1 week. We discussed the digital medicine BP program,  that he will discuss with his PCP as well. Patient is medically stable to discharge home.     Goals of Care Treatment Preferences:  Code Status: Full Code      Stroke Etiology: Ischemic Cardioembolic Atrial fibrillation    STROKE DOCUMENTATION         NIH Scale:  1a. Level of Consciousness: 0-->Alert, keenly responsive  1b. LOC Questions: 0-->Answers both questions correctly  1c. LOC Commands: 0-->Performs both tasks correctly  2. Best Gaze: 0-->Normal  3. Visual: 0-->No visual loss  4. Facial Palsy: 0-->Normal symmetrical movements  5a. Motor Arm, Left: 1-->Drift, limb holds 90 (or 45) degrees, but drifts down before full 10 seconds, does not hit bed or other support  5b. Motor Arm, Right: 0-->No drift, limb holds 90 (or 45) degrees for full 10 secs  6a. Motor Leg, Left: 0-->No drift, leg holds 30 degree position for full 5 secs  6b. Motor Leg, Right: 0-->No drift, leg holds 30 degree position for full 5 secs  7. Limb Ataxia: 0-->Absent  8. Sensory: 0-->Normal, no sensory loss  9. Best Language: 0-->No aphasia, normal  10. Dysarthria: 0-->Normal  11. Extinction and Inattention (formerly Neglect): 0-->No abnormality  Total (NIH Stroke Scale): 1        Modified Hannibal Score: 0  Weare Coma Scale:    ABCD2 Score:    [id number] Score:   HAS -BLED Score:   ICH Score:   Hunt & Bustillo Classification:       Assessment/Plan:     Diagnostic Results:      Brain Imaging:   MRI brain   Subcentimeter acute infarct subcortical white matter right frontal lobe.  No intracranial hemorrhage or mass effect.     Vessel Imaging   MRA Brain  no major branch advanced stenosis/occlusion at the [id number].  No evidence of aneurysm or AVM.    Cardiac Evaluation:   ECHO     Left Ventricle: The left ventricle is normal in size. Ventricular mass is normal. Normal wall thickness. There is mildly reduced systolic function with a visually estimated ejection fraction of 45 - 50%. Grade III diastolic dysfunction. LV function may be  undererstimated in the setting of frequent ectopy    Right Ventricle: Normal right ventricular cavity size. Wall thickness is normal. Right ventricle wall motion  is normal. Systolic function is mildly reduced.    Left Atrium: Left atrium is mildly dilated.    Aortic Valve: There is mild to moderate aortic regurgitation with a centrally directed jet.    Mitral Valve: There is moderate regurgitation with a centrally directed jet.    Tricuspid Valve: There is mild to moderate regurgitation with a centrally directed jet.    Pulmonary Artery: There is severe pulmonary hypertension. The estimated pulmonary artery systolic pressure is 70 mmHg.    IVC/SVC: Normal venous pressure at 3 mmHg.    Pericardium: There is no pericardial effusion. Left pleural effusion.    Interventions: None    Complications: None    Disposition: Home or Self Care    Final Active Diagnoses:    Diagnosis Date Noted POA    PRINCIPAL PROBLEM:  Embolic stroke involving right middle cerebral artery [I63.411] 12/07/2024 Yes    Peripheral neuropathy [G62.9] 12/07/2024 Yes    CHF (congestive heart failure) [I50.9] 12/07/2024 Yes    Paroxysmal A-fib [I48.0] 12/19/2023 Yes    S/P cadaveric kidney transplant 11/5/2016. ESRD secondary to HTN/DMII [Z94.0] 11/05/2016 Not Applicable    Hypertension [I10] 07/29/2014 Yes    Type 2 diabetes mellitus with stage 3a chronic kidney disease, with long-term current use of insulin [E11.22, N18.31, Z79.4] 07/29/2014 Not Applicable    Hyperlipidemia [E78.5] 07/29/2014 Yes      Problems Resolved During this Admission:     Neuro  * Embolic stroke involving right middle cerebral artery  Mr. Ravi Zamorano is a 69 y.o. patient admitted for LSW. History of afib on Xarelto, HTN closely managed by cardiology, DM2, CKD3 s/p kidney transplant in 2016. Initially presented with acute LSW without any falls. LKN ~11PM PTA. NIHSS 1 with minor left sided facial paralysis (at baseline), CTH negative. MRA brain, head and neck negative.  MRI brain with right cortical hyperintensity on DWI. No indications of any acute intervention. Patient back at baseline. Persisted hypertensive overnight but within goal. Resumed home coreg in addition to lasix to improve volume status.     Current etiology of symptoms suspected to be from embolic/cardioembolic in the setting of afib. No need to change anticoagulation. ECHO w worsening EF 45-50%. Patient is not in decompensated HF, will need closer OP follow up with cardiology.       Antithrombotics for secondary stroke prevention: Antiplatelets: None: Restarted on home Xarelto. Loaded with  in the ED.     Statins for secondary stroke prevention and hyperlipidemia, if present:   Statins: Atorvastatin- 40 mg daily    Aggressive risk factor modification: HTN, DM, HLD, A-Fib     Rehab efforts: The patient has been evaluated by a stroke team provider and the therapy needs have been fully considered based off the presenting complaints and exam findings. The following therapy evaluations are needed: PT evaluate and treat, OT evaluate and treat, SLP evaluate and treat, PM&R evaluate for appropriate placement    Diagnostics ordered/pending: Pending official echo report     VTE prophylaxis: None: Reason for No Pharmacological VTE Prophylaxis: Currently on anticoagulation    BP parameters: Infarct: No intervention, SBP <220        Recommendations:     Post-discharge complication risks: None    Stroke Education given to: patient    Follow-up in Stroke Clinic in 4-6 weeks.     Discharge Plan:  Statin: Atorvastatin 40mg  Anticoagulant: Rivaroxaban  Aggresive risk factor modification:  Hypertension  Diabetes  Atrial Fibrillation    Follow Up:   Follow-up Information       Mima Mack MD Follow up in 1 week(s).    Specialty: Internal Medicine  Contact information:  [address]  [place] [id number] [phone]                             Patient Instructions:      Ambulatory referral/consult to Vascular  Neurology   Standing Status: Future   Referral Priority: Routine Referral Type: Consultation   Referral Reason: Specialty Services Required   Requested Specialty: Vascular Neurology   Number of Visits Requested: 1     Ambulatory referral/consult to Cardiology   Standing Status: Future   Referral Priority: Routine Referral Type: Consultation   Referral Reason: Specialty Services Required   Requested Specialty: Cardiology   Number of Visits Requested: 1     Diet Cardiac   Order Comments: See Stroke Patient Education Guide Booklet for details.     Call 911 for any of the following:   Order Comments: Call 911  right away if any of the following warning signs come on suddenly, even if the symptoms only last for a few minutes. With stroke, timing is very important.   - Warning Signs of Stroke:  - Weakness: You may feel a sudden weakness, tingling or loss of feeling on one side of your face or body.  - Vision Problems: You may have sudden double vision or trouble seeing in one or both eyes.  - Speech Problems: You may have sudden trouble talking, slured speech, or problems understanding others.  - Headache: You may have sudden, severe headache.  - Movement Problems: You may experience dizziness, a feeling of spinning, a loss of balance, a feeling of falling or blackouts.     Activity as tolerated     Call MD for:  persistent dizziness, light-headedness, or visual disturbances     Call MD for:  severe persistent headache     Call MD for:  increased confusion or weakness       Medications:  Reconciled Home Medications:      Medication List        CHANGE how you take these medications      tacrolimus 0.5 MG Cap  Commonly known as: PROGRAF  Take 1 capsule (0.5 mg total) by mouth once daily. Start tomorrow  What changed: additional instructions            CONTINUE taking these medications      atorvastatin 40 MG tablet  Commonly known as: LIPITOR  Take 1 tablet (40 mg total) by mouth once daily.     BD ULTRA-FINE LO PEN  "NEEDLE 32 gauge x 5/32" Ndle  Generic drug: pen needle, diabetic  USE TO ADMINISTER INSULIN 4 TIMES DAILY.     blood-glucose sensor Kayla  Gin 3, use as directed, change every 14 days , e 11.65     carvediloL 25 MG tablet  Commonly known as: COREG  Take 1 tablet (25 mg total) by mouth 2 (two) times daily.     doxazosin 1 MG tablet  Commonly known as: CARDURA  Take 1 tablet (1 mg total) by mouth every evening.     FLUAD TRIV 2024-25(65Y UP)(PF) 45 mcg (15 mcg x 3)/0.5 mL IM vaccine (> or = 66 yo)  Generic drug: influenza (adjuvanted)  Inject into the muscle.     furosemide 40 MG tablet  Commonly known as: LASIX  Take 2 tablets (80 mg total) by mouth 2 (two) times a day.     gabapentin 300 MG capsule  Commonly known as: NEURONTIN  Take 1 capsule by mouth in the morning, 1 capsule midday, and 3 capsules at night.     GVOKE HYPOPEN 2-PACK 1 mg/0.2 mL Atin  Generic drug: glucagon  Inject 0.2 mLs into the skin as needed (severe hypoglycemia).     hydrALAZINE 100 MG tablet  Commonly known as: APRESOLINE  Take 1 tablet (100 mg total) by mouth every 8 (eight) hours.     JARDIANCE 10 mg tablet  Generic drug: empagliflozin  Take 1 tablet (10 mg total) by mouth once daily.     LANTUS SOLOSTAR U-100 INSULIN 100 unit/mL (3 mL) Inpn pen  Generic drug: insulin glargine U-100 (Lantus)  Inject 20 Units into the skin every morning.     magnesium oxide 400 mg (241.3 mg magnesium) tablet  Commonly known as: MAG-OX  Take 1 tablet (400 mg total) by mouth 2 (two) times daily.     meclizine 25 mg tablet  Commonly known as: ANTIVERT  Take 1 tablet (25 mg total) by mouth 3 (three) times daily as needed for Dizziness.     mycophenolate 250 mg Cap  Commonly known as: CELLCEPT  Take 2 capsules (500 mg total) by mouth 2 (two) times daily.     NovoLOG Flexpen U-100 Insulin 100 unit/mL (3 mL) Inpn pen  Generic drug: insulin aspart U-100  Inject 10 units under the skin w/ breakfast, 7 units at lunch and dinner. Scale 180-230+2, 231-280+4, " 281-330+6, 331-380+8, >380+10.  Max daily 57 units.     predniSONE 5 MG tablet  Commonly known as: DELTASONE  Take 1 tablet (5 mg total) by mouth once daily.     TRUE METRIX GLUCOSE METER Misc  Generic drug: blood-glucose meter  Use to check blood glucose 1 times daily, to use with insurance preferred meter     TRUE METRIX GLUCOSE TEST STRIP Strp  Generic drug: blood sugar diagnostic  Use to check blood glucose 1 times daily, to use with insurance preferred meter     TRUEPLUS LANCETS 30 gauge Misc  Generic drug: lancets  Use to check blood glucose 1 times daily, to use with insurance preferred meter     valsartan 320 MG tablet  Commonly known as: DIOVAN  Take 1 tablet (320 mg total) by mouth once daily.     VITAMIN D2 1,250 mcg (50,000 unit) capsule  Generic drug: ergocalciferol  Take 1 capsule (50,000 Units total) by mouth every 7 days.     XARELTO 15 mg Tab  Generic drug: rivaroxaban  Take 1 tablet (15 mg total) by mouth daily with dinner or evening meal.            STOP taking these medications      aspirin 81 MG Chew              Lucía Sharpe MD  Comprehensive Stroke Center  Department of Vascular Neurology   [facility] Neurosurgery [facility])

## 2024-12-08 NOTE — SUBJECTIVE & OBJECTIVE
Neurologic Chief Complaint: R cortical infarct/cardioembolic source     Subjective:     Interval History: Patient is seen for follow-up neurological assessment and treatment recommendations: See hospital course     HPI, Past Medical, Family, and Social History remains the same as documented in the initial encounter.     Review of Systems   Neurological:  Positive for facial asymmetry and weakness. Negative for numbness and headaches.     Scheduled Meds:   atorvastatin  40 mg Oral Daily    carvediloL  25 mg Oral BID    furosemide  80 mg Oral BID    gabapentin  300 mg Oral QHS    hydrALAZINE  100 mg Oral Q8H    mycophenolate  500 mg Oral BID    predniSONE  5 mg Oral Daily    rivaroxaban  15 mg Oral Daily with dinner    valsartan  320 mg Oral QHS     Continuous Infusions:  PRN Meds:  Current Facility-Administered Medications:     bisacodyL, 10 mg, Rectal, Daily PRN    dextrose 10%, 12.5 g, Intravenous, PRN    dextrose 10%, 25 g, Intravenous, PRN    glucagon (human recombinant), 1 mg, Intramuscular, PRN    insulin aspart U-100, 0-10 Units, Subcutaneous, Q6H PRN    labetalol, 10 mg, Intravenous, Q15 Min PRN    ondansetron, 8 mg, Oral, Q8H PRN    polyethylene glycol, 17 g, Oral, Daily PRN    sodium chloride 0.9%, 500 mL, Intravenous, PRN    sodium chloride 0.9%, 10 mL, Intravenous, PRN    Objective:     Vital Signs (Most Recent):  Temp: 97.6 °F (36.4 °C) (12/08/24 1149)  Pulse: (!) 53 (12/08/24 1149)  Resp: 16 (12/08/24 1149)  BP: (!) 186/80 (12/08/24 1149)  SpO2: 97 % (12/08/24 1149)  BP Location: Right arm    Vital Signs Range (Last 24H):  Temp:  [97.4 °F (36.3 °C)-98.2 °F (36.8 °C)]   Pulse:  [53-75]   Resp:  [16-18]   BP: (185-228)/()   SpO2:  [94 %-97 %]   BP Location: Right arm       Physical Exam  Vitals reviewed.   Neurological:      Mental Status: He is alert.      Cranial Nerves: No cranial nerve deficit.      Sensory: No sensory deficit.      Motor: Weakness present.      Comments: See neurological exam                Neurological Exam:   LOC: alert  Attention Span: Good   Language: No aphasia  Articulation: No dysarthria  Orientation: Person, Place, Time   Visual Fields: Full  EOM (CN III, IV, VI): Full/intact  Facial Movement (CN VII): Lower facial weakness on the Left  Motor: Arm left  Paresis: 3/5  Leg left  Paresis: 4/5  Arm right  Normal 5/5  Leg right Normal 5/5  Tone: Normal tone throughout    Laboratory:  CMP:   Recent Labs   Lab 12/08/24  0551   CALCIUM 8.4*   ALBUMIN 2.5*   PROT 5.0*      K 4.5   CO2 22*      BUN 31*   CREATININE 2.2*   ALKPHOS 51   ALT <5*   AST 9*   BILITOT 0.3     CBC:   Recent Labs   Lab 12/08/24  0550   WBC 5.44   RBC 4.61   HGB 10.5*   HCT 36.1*   *   MCV 78*   MCH 22.8*   MCHC 29.1*     Hgb A1C:   Recent Labs   Lab 12/07/24  1732   HGBA1C 8.1*     TSH:   Recent Labs   Lab 12/07/24  0629   TSH 1.738       Diagnostic Results      Brain Imaging   MRI Brain   8 mm focus of restricted diffusion within the white matter of the right frontal lobe subcortically consistent with an acute infarct.  No other acute infarct is identified within the remainder of the brain parenchyma.     No hydrocephalus mass effect or intracranial hemorrhage.     Increased signal within the periventricular and subcortical white matter is nonspecific but may reflect mild approaching moderate chronic small vessel ischemic change.  Chronic right thalamic lacunar infarct.     Punctate chronic hemosiderin staining left frontal subcortical white matter.     Minimal right maxillary mucosal thickening.  The mastoid air cells are essentially clear.     Impression:     Subcentimeter acute infarct subcortical white matter right frontal lobe.  No intracranial hemorrhage or mass effect.     CTH     Blood: No acute intracranial hemorrhage.     Parenchyma: No definite loss of gray-white differentiation to suggest acute or subacute transcortical infarct. There is supratentorial primarily periventricular  hypoattenuation, nonspecific.     Ventricles/Extra-axial spaces: No abnormal extra-axial fluid collection. Basal cisterns are patent.     Vessels: Severe intracranial vascular calcifications     Orbits: Unremarkable.     Scalp: Unremarkable.     Skull: There are no depressed skull fractures or destructive bone lesions.     Sinuses and mastoids: Essentially clear.     Other findings: None     Cervical spine:     Fractures: No acute fractures     Alignment: There is no significant vertebral subluxation  Atlanto-axial and atlanto-occipital joints: Atlanto-axial and atlanto-occipital intervals are not widened.  Facet joints: There is no traumatic facet joint widening.  Vertebral bodies: Normal  Discs:  Spinal canal and foraminal narrowing: Although CT does not optimally evaluate the soft tissue contents of the spinal canal and foramina, no critical stenosis is suggested.  Paraspinal soft tissues: Unremarkable.     Upper Lungs:Bilateral pleural effusions are present.     Impression:     1. No acute intracranial abnormality.  2. No fracture or subluxation of the cervical spine.    Vessel Imaging   MRA Brain  no major branch advanced stenosis/occlusion at the [id number]-[id number]-Cpnbhp.     No evidence of aneurysm or AVM.     Cardiac Evaluation:   Done. Pending official report.

## 2024-12-08 NOTE — PLAN OF CARE
[facility] - Neurosurgery (Hospital)  Initial Discharge Assessment       Primary Care Provider: Mima Mack MD    Admission Diagnosis: Pleural effusion [J90]  Stroke [I63.9]  Upper extremity weakness [R29.898]  Acute focal neurological deficit [R29.818]    Admission Date: 12/7/2024  Expected Discharge Date:     Transition of Care Barriers: None    Payor: HUMA[employer] MANAGED MEDICARE / Plan: HU[employer]NA MEDICARE HMO / Product Type: Capitation /     Extended Emergency Contact Information  Primary Emergency Contact: Erika Zamorano  Address: [address] [place] [address] [facility]  Home Phone: [phone]  Mobile Phone: [phone]  Relation: Spouse    Discharge Plan A: Home Health  Discharge Plan B: Home with family      [facility]  [address] [facility] [address]  Phone: [phone] Fax: [phone]    [facility] [employer] [id number] [address] [address]  Phone: [phone] Fax: [phone]      Initial Assessment (most recent)       Adult Discharge Assessment - 12/08/24 1407          Discharge Assessment    Assessment Type Discharge Planning Assessment     Confirmed/corrected address, phone number and insurance Yes     Confirmed Demographics Correct on Facesheet     Source of Information patient     Does patient/caregiver understand observation status Yes     Communicated UBALDO with patient/caregiver Yes;Date not available/Unable to determine     Reason For Admission Cardioembolic stroke     People in Home spouse     Do you expect to return to your current living situation? Yes     Do you have help at home or someone to help you manage your care at home? Yes     Who are your caregiver(s) and their phone number(s)? Erika Zamorano Spouse [phone]     Prior to hospitilization cognitive status: Alert/Oriented     Current cognitive status:  Alert/Oriented     Walking or Climbing Stairs Difficulty yes     Walking or Climbing Stairs ambulation difficulty, requires equipment     Dressing/Bathing Difficulty no     Equipment Currently Used at Home cane, straight     Readmission within 30 days? No     Patient currently being followed by outpatient case management? No     Do you currently have service(s) that help you manage your care at home? No     Do you take prescription medications? Yes     Do you have prescription coverage? Yes     Coverage Payor: HUMANA MANAGED MEDICARE - HUMANA MEDICARE HMO - CAPITATED     Do you have any problems affording any of your prescribed medications? No     Is the patient taking medications as prescribed? yes     How do you get to doctors appointments? car, drives self;family or friend will provide     Are you on dialysis? No     Do you take coumadin? No     Discharge Plan A Home Health     Discharge Plan B Home with family     DME Needed Upon Discharge  other (see comments)   TBD    Discharge Plan discussed with: Patient     Transition of Care Barriers None                   Spoke to pt. Pt lives at home with his wife. Post hospital  stay patients wife will be pt support person and pt. has transportation at d/c with his wife. There have been no hospitalizations within the last 30 days per pt. Verified pt PCP and preferred pharmacy. Pt stated not on Coumadin and is not receiving dialysis. All questions answered regarding case management/ discharge planning , pt verbalized understanding. Discharge booklet with SW contact information given to pt.     Discharge Plan A and Plan B have been determined by review of patient's clinical status, future medical and therapeutic needs, and coverage/benefits for post-acute care in coordination with multidisciplinary team members.      LYNNE Marie  [facility]   Ext: [phone]

## 2024-12-10 ENCOUNTER — TELEPHONE (OUTPATIENT)
Dept: CARDIOLOGY | Facility: CLINIC | Age: 69
End: 2024-12-10
Payer: MEDICARE

## 2024-12-10 DIAGNOSIS — I50.32 CHRONIC DIASTOLIC HEART FAILURE: Primary | ICD-10-CM

## 2024-12-10 DIAGNOSIS — I48.19 PERSISTENT ATRIAL FIBRILLATION: ICD-10-CM

## 2024-12-10 DIAGNOSIS — I10 ESSENTIAL (PRIMARY) HYPERTENSION: ICD-10-CM

## 2024-12-10 NOTE — TELEPHONE ENCOUNTER
----- Message from Nayely Vital PA-C sent at 12/10/2024 11:54 AM CST -----  Hi,    This patient is scheduled with me for 12/30. Is there any staff cardiologist with availability prior? I have seen him the past 4 times he has been here, and he is fairly complicated.     Thanks!  Maira  ----- Message -----  From: Lucía Wyatt MD  Sent: 12/8/2024   4:04 PM CST  To: Nayely Vital PA-C; #    Hello,   Patient admitted for embolic R MCA infarct and had repeat ECHO w worsening heart function since 6/2024.   Wanted to make sure he gets followed up sooner if able, has appointment for 1/8/2025.   Thank you!     Lucía Sharpe MD   Vascular Neurology Fellow- PGY5  [facility]

## 2024-12-10 NOTE — TELEPHONE ENCOUNTER
Pt rescheduled from appointment w. Ms Vital, at her request,  to appointment with dr Gillies + EKG due to complex medical hx. Note pt w. recent hosp d/c and no md visit in cards for a while. Pt  agreed to date/time of appointment(s). He was told to bring meds and he verbalized understanding. He did mention that he was having SOB edema etc issues.

## 2024-12-11 ENCOUNTER — LAB VISIT (OUTPATIENT)
Dept: LAB | Facility: HOSPITAL | Age: 69
End: 2024-12-11
Payer: MEDICARE

## 2024-12-11 DIAGNOSIS — Z94.0 KIDNEY REPLACED BY TRANSPLANT: ICD-10-CM

## 2024-12-11 PROCEDURE — 80197 ASSAY OF TACROLIMUS: CPT | Mod: HCNC | Performed by: INTERNAL MEDICINE

## 2024-12-11 PROCEDURE — 36415 COLL VENOUS BLD VENIPUNCTURE: CPT | Mod: HCNC,PN | Performed by: INTERNAL MEDICINE

## 2024-12-12 ENCOUNTER — TELEPHONE (OUTPATIENT)
Dept: CARDIOLOGY | Facility: CLINIC | Age: 69
End: 2024-12-12
Payer: MEDICARE

## 2024-12-12 ENCOUNTER — PATIENT OUTREACH (OUTPATIENT)
Dept: ADMINISTRATIVE | Facility: CLINIC | Age: 69
End: 2024-12-12
Payer: MEDICARE

## 2024-12-12 DIAGNOSIS — R00.2 PALPITATIONS: Primary | ICD-10-CM

## 2024-12-12 LAB — TACROLIMUS BLD-MCNC: 5.5 NG/ML (ref 5–15)

## 2024-12-12 NOTE — PROGRESS NOTES
C3 nurse spoke with Ravi Zamorano (spouse, Erika) for a TCC post hospital discharge follow up call. The patient has a scheduled HOSFU appointment with Mima Mack MD on 01/03/2025 @ 1030.    The patient's spouse declined an NP care at home appointment at this time.

## 2024-12-16 ENCOUNTER — OFFICE VISIT (OUTPATIENT)
Dept: CARDIOLOGY | Facility: CLINIC | Age: 69
End: 2024-12-16
Payer: MEDICARE

## 2024-12-16 ENCOUNTER — HOSPITAL ENCOUNTER (OUTPATIENT)
Dept: CARDIOLOGY | Facility: CLINIC | Age: 69
Discharge: HOME | End: 2024-12-16
Payer: MEDICARE

## 2024-12-16 VITALS
DIASTOLIC BLOOD PRESSURE: 75 MMHG | WEIGHT: 204.13 LBS | HEIGHT: 73 IN | BODY MASS INDEX: 27.05 KG/M2 | HEART RATE: 76 BPM | SYSTOLIC BLOOD PRESSURE: 158 MMHG | OXYGEN SATURATION: 99 %

## 2024-12-16 DIAGNOSIS — I50.32 CHRONIC DIASTOLIC HEART FAILURE: Primary | ICD-10-CM

## 2024-12-16 DIAGNOSIS — N18.32 TYPE 2 DIABETES MELLITUS WITH STAGE 3B CHRONIC KIDNEY DISEASE, WITH LONG-TERM CURRENT USE OF INSULIN: ICD-10-CM

## 2024-12-16 DIAGNOSIS — E11.22 TYPE 2 DIABETES MELLITUS WITH STAGE 3B CHRONIC KIDNEY DISEASE, WITH LONG-TERM CURRENT USE OF INSULIN: ICD-10-CM

## 2024-12-16 DIAGNOSIS — I63.411 EMBOLIC STROKE INVOLVING RIGHT MIDDLE CEREBRAL ARTERY: ICD-10-CM

## 2024-12-16 DIAGNOSIS — I48.4 ATYPICAL ATRIAL FLUTTER: ICD-10-CM

## 2024-12-16 DIAGNOSIS — E78.2 MIXED HYPERLIPIDEMIA: ICD-10-CM

## 2024-12-16 DIAGNOSIS — R00.2 PALPITATIONS: ICD-10-CM

## 2024-12-16 DIAGNOSIS — I73.9 PAD (PERIPHERAL ARTERY DISEASE): ICD-10-CM

## 2024-12-16 DIAGNOSIS — Z79.4 TYPE 2 DIABETES MELLITUS WITH STAGE 3B CHRONIC KIDNEY DISEASE, WITH LONG-TERM CURRENT USE OF INSULIN: ICD-10-CM

## 2024-12-16 DIAGNOSIS — I48.0 PAROXYSMAL A-FIB: ICD-10-CM

## 2024-12-16 DIAGNOSIS — I50.32 CHRONIC DIASTOLIC CONGESTIVE HEART FAILURE: ICD-10-CM

## 2024-12-16 DIAGNOSIS — E78.5 HYPERLIPIDEMIA, UNSPECIFIED HYPERLIPIDEMIA TYPE: ICD-10-CM

## 2024-12-16 DIAGNOSIS — I10 PRIMARY HYPERTENSION: ICD-10-CM

## 2024-12-16 LAB
OHS QRS DURATION: 108 MS
OHS QTC CALCULATION: 433 MS

## 2024-12-16 PROCEDURE — 3288F FALL RISK ASSESSMENT DOCD: CPT | Mod: HCNC,CPTII,GC,S$GLB | Performed by: STUDENT IN AN ORGANIZED HEALTH CARE EDUCATION/TRAINING PROGRAM

## 2024-12-16 PROCEDURE — 99999 PR PBB SHADOW E&M-EST. PATIENT-LVL V: CPT | Mod: PBBFAC,HCNC,GC, | Performed by: STUDENT IN AN ORGANIZED HEALTH CARE EDUCATION/TRAINING PROGRAM

## 2024-12-16 PROCEDURE — 3066F NEPHROPATHY DOC TX: CPT | Mod: HCNC,CPTII,GC,S$GLB | Performed by: STUDENT IN AN ORGANIZED HEALTH CARE EDUCATION/TRAINING PROGRAM

## 2024-12-16 PROCEDURE — 93010 ELECTROCARDIOGRAM REPORT: CPT | Mod: HCNC,S$GLB,, | Performed by: STUDENT IN AN ORGANIZED HEALTH CARE EDUCATION/TRAINING PROGRAM

## 2024-12-16 PROCEDURE — 1159F MED LIST DOCD IN RCRD: CPT | Mod: HCNC,CPTII,GC,S$GLB | Performed by: STUDENT IN AN ORGANIZED HEALTH CARE EDUCATION/TRAINING PROGRAM

## 2024-12-16 PROCEDURE — 3052F HG A1C>EQUAL 8.0%<EQUAL 9.0%: CPT | Mod: HCNC,CPTII,GC,S$GLB | Performed by: STUDENT IN AN ORGANIZED HEALTH CARE EDUCATION/TRAINING PROGRAM

## 2024-12-16 PROCEDURE — 3061F NEG MICROALBUMINURIA REV: CPT | Mod: HCNC,CPTII,GC,S$GLB | Performed by: STUDENT IN AN ORGANIZED HEALTH CARE EDUCATION/TRAINING PROGRAM

## 2024-12-16 PROCEDURE — 3077F SYST BP >= 140 MM HG: CPT | Mod: HCNC,CPTII,GC,S$GLB | Performed by: STUDENT IN AN ORGANIZED HEALTH CARE EDUCATION/TRAINING PROGRAM

## 2024-12-16 PROCEDURE — 99215 OFFICE O/P EST HI 40 MIN: CPT | Mod: HCNC,GC,S$GLB, | Performed by: STUDENT IN AN ORGANIZED HEALTH CARE EDUCATION/TRAINING PROGRAM

## 2024-12-16 PROCEDURE — 3078F DIAST BP <80 MM HG: CPT | Mod: HCNC,CPTII,GC,S$GLB | Performed by: STUDENT IN AN ORGANIZED HEALTH CARE EDUCATION/TRAINING PROGRAM

## 2024-12-16 PROCEDURE — 3008F BODY MASS INDEX DOCD: CPT | Mod: HCNC,CPTII,GC,S$GLB | Performed by: STUDENT IN AN ORGANIZED HEALTH CARE EDUCATION/TRAINING PROGRAM

## 2024-12-16 PROCEDURE — 4010F ACE/ARB THERAPY RXD/TAKEN: CPT | Mod: HCNC,CPTII,GC,S$GLB | Performed by: STUDENT IN AN ORGANIZED HEALTH CARE EDUCATION/TRAINING PROGRAM

## 2024-12-16 PROCEDURE — 1101F PT FALLS ASSESS-DOCD LE1/YR: CPT | Mod: HCNC,CPTII,GC,S$GLB | Performed by: STUDENT IN AN ORGANIZED HEALTH CARE EDUCATION/TRAINING PROGRAM

## 2024-12-16 PROCEDURE — 93005 ELECTROCARDIOGRAM TRACING: CPT | Mod: HCNC,S$GLB,, | Performed by: STUDENT IN AN ORGANIZED HEALTH CARE EDUCATION/TRAINING PROGRAM

## 2024-12-16 RX ORDER — ATORVASTATIN CALCIUM 80 MG/1
80 TABLET, FILM COATED ORAL DAILY
Qty: 90 TABLET | Refills: 3 | Status: SHIPPED | OUTPATIENT
Start: 2024-12-16 | End: 2025-12-16

## 2024-12-16 RX ORDER — HYDROCHLOROTHIAZIDE 12.5 MG/1
12.5 TABLET ORAL DAILY
Qty: 30 TABLET | Refills: 11 | Status: SHIPPED | OUTPATIENT
Start: 2024-12-16 | End: 2025-12-16

## 2024-12-16 NOTE — PROGRESS NOTES
PCP - Mima Mack MD  Referring Physician:     Subjective:   Patient ID:  Ravi Zamorano is a 69 y.o. 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 He previously had low normal EF (55-60%) and was found to have an EF of 45-50% during the admission. There was frequent ectopy during the echo. He was not in decompensated HF during his admission. He was discharged from the hospital on 24.     He still has some left upper extremity weakness.     His only other symptom he complains of is shortness of breath. He is short of breath after walking 1/2-1 block on flat ground or very short distance with incline. He is limited with many physical activities due to DOSS. He has some leg swelling as well.     The patient denies any chest discomfort including with exertion, SOB at rest, orthopnea, PND, palpitations, lightheadedness, pre syncope or syncope.     He was previously on Eliquis but switched to Xarelto due to insurance coverage issues (not a recent change). His CrCl is consistently below 50 (best was 46, 3mos ago) and Xarelto is appropriately dosed.    He is compliant with his meds including Xarelto with meals every day.     History:     Social History     Tobacco Use    Smoking status: Former     Current packs/day: 0.00     Average packs/day: 0.5 packs/day for 32.0 years (16.0 ttl pk-yrs)     Types: Cigarettes     Start date: 1984     Quit date: 2016     Years since quittin.0    Smokeless tobacco: Never    Tobacco comments:     Pt reports that he quit in , but started up again in . pt reports he is currently working on quitting again   Substance Use Topics    Alcohol use: Yes     Comment: Pt reports  occasional beers on Sundays. Pt reports drinking daily prior to ESRD diagnosis.     Family History   Problem Relation Name Age of Onset    Diabetes Mother      Hypertension Mother      Heart disease Mother          CAD with PCI    Heart disease Father      Cancer Brother 2         one sister with breast cancer    Hypertension Brother 2         one sister with HTN and borderline DM    Stroke Neg Hx      Kidney disease Neg Hx      Colon cancer Neg Hx      Esophageal cancer Neg Hx         Meds:     Review of patient's allergies indicates:   Allergen Reactions    Nifedipine Other (See Comments)     Gingival hyperplasia       Current Outpatient Medications:     atorvastatin (LIPITOR) 40 MG tablet, Take 1 tablet (40 mg total) by mouth once daily., Disp: 90 tablet, Rfl: 3    blood sugar diagnostic Strp, Use to check blood glucose 1 times daily, to use with insurance preferred meter, Disp: 100 each, Rfl: 11    blood-glucose meter Misc, Use to check blood glucose 1 times daily, to use with insurance preferred meter, Disp: 1 each, Rfl: 0    blood-glucose sensor Kayla, Gin 3, use as directed, change every 14 days , e 11.65, Disp: 2 each, Rfl: 11    carvediloL (COREG) 25 MG tablet, Take 1 tablet (25 mg total) by mouth 2 (two) times daily., Disp: 180 tablet, Rfl: 3    doxazosin (CARDURA) 1 MG tablet, Take 1 tablet (1 mg total) by mouth every evening., Disp: 30 tablet, Rfl: 11    empagliflozin (JARDIANCE) 10 mg tablet, Take 1 tablet (10 mg total) by mouth once daily., Disp: 90 tablet, Rfl: 0    ergocalciferol (ERGOCALCIFEROL) 50,000 unit Cap, Take 1 capsule (50,000 Units total) by mouth every 7 days. (Patient not taking: Reported on 12/12/2024), Disp: 4 capsule, Rfl: 6    furosemide (LASIX) 40 MG tablet, Take 2 tablets (80 mg total) by mouth 2 (two) times a day., Disp: 360 tablet, Rfl: 3    gabapentin (NEURONTIN) 300 MG capsule, Take 1 capsule by mouth in the morning, 1 capsule midday, and 3 capsules at night., Disp: 450  "capsule, Rfl: 3    glucagon (GVOKE HYPOPEN 2-PACK) 1 mg/0.2 mL AtIn, Inject 0.2 mLs into the skin as needed (severe hypoglycemia). (Patient not taking: Reported on 12/12/2024), Disp: 0.4 mL, Rfl: 2    hydrALAZINE (APRESOLINE) 100 MG tablet, Take 1 tablet (100 mg total) by mouth every 8 (eight) hours., Disp: 270 tablet, Rfl: 3    influenza, adjuvanted, (FLUAD TRIV 2024-25,65Y UP,,PF,) 45 mcg (15 mcg x 3)/0.5 mL IM vaccine (> or = 64 yo), Inject into the muscle., Disp: 0.5 mL, Rfl: 0    insulin aspart U-100 (NOVOLOG) 100 unit/mL (3 mL) InPn pen, Inject 10 units under the skin w/ breakfast, 7 units at lunch and dinner. Scale 180-230+2, 231-280+4, 281-330+6, 331-380+8, >380+10.  Max daily 57 units., Disp: 30 mL, Rfl: 11    insulin glargine U-100, Lantus, (LANTUS SOLOSTAR U-100 INSULIN) 100 unit/mL (3 mL) InPn pen, Inject 20 Units into the skin every morning., Disp: 15 mL, Rfl: 6    lancets 30 gauge Misc, Use to check blood glucose 1 times daily, to use with insurance preferred meter, Disp: 100 each, Rfl: 3    magnesium oxide (MAG-OX) 400 mg (241.3 mg magnesium) tablet, Take 1 tablet (400 mg total) by mouth 2 (two) times daily., Disp: 60 tablet, Rfl: 6    meclizine (ANTIVERT) 25 mg tablet, Take 1 tablet (25 mg total) by mouth 3 (three) times daily as needed for Dizziness., Disp: 21 tablet, Rfl: 3    mycophenolate (CELLCEPT) 250 mg Cap, Take 2 capsules (500 mg total) by mouth 2 (two) times daily., Disp: 120 capsule, Rfl: 11    pen needle, diabetic (BD ULTRA-FINE LO PEN NEEDLE) 32 gauge x 5/32" Ndle, USE TO ADMINISTER INSULIN 4 TIMES DAILY., Disp: 400 each, Rfl: 3    predniSONE (DELTASONE) 5 MG tablet, Take 1 tablet (5 mg total) by mouth once daily., Disp: 30 tablet, Rfl: 11    rivaroxaban (XARELTO) 15 mg Tab, Take 1 tablet (15 mg total) by mouth daily with dinner or evening meal., Disp: 30 tablet, Rfl: 11    tacrolimus (PROGRAF) 0.5 MG Cap, Take 1 capsule (0.5 mg total) by mouth once daily. Start tomorrow, Disp: 30 " "capsule, Rfl: 11    valsartan (DIOVAN) 320 MG tablet, Take 1 tablet (320 mg total) by mouth once daily., Disp: 90 tablet, Rfl: 3    Current Facility-Administered Medications:     epoetin tanya injection 4,000 Units, 4,000 Units, Subcutaneous, Q7 Days,     Facility-Administered Medications Ordered in Other Visits:     balanced salt irrigation intra-ocular solution 1 drop, 1 drop, Right Eye, On Call Procedure, Jennifer Palacio MD    phenylephrine HCL 10% ophthalmic solution 1 drop, 1 drop, Right Eye, PRN, Jennifer Palacio MD    proparacaine 0.5 % ophthalmic solution 1 drop, 1 drop, Right Eye, Daily PRN, Jennifer Palacio MD    sodium chloride 0.9% flush 10 mL, 10 mL, Intravenous, PRN, Jennifer Palacio MD    TETRAcaine HCl (PF) 0.5 % Drop 1 drop, 1 drop, Right Eye, PRN, Jennifer Palacio MD    Constitution: Negative for fever, chills, weight loss or gain.   HENT: Negative for sore throat, rhinorrhea, or headache.  Eyes: Negative for blurred or double vision.   Cardiovascular: See above  Pulmonary: Negative for SOB   Gastrointestinal: Negative for abdominal pain, nausea, vomiting, or diarrhea.   : Negative for dysuria.   Neurological: Negative for focal weakness or sensory changes.    Objective:   BP (!) 158/75 (Patient Position: Sitting)   Pulse 76   Ht 6' 1" (1.854 m)   Wt 92.6 kg (204 lb 2.3 oz)   SpO2 99%   BMI 26.93 kg/m²   Constitutional: No distress, appropriately conversant  HEENT: Sclera anicteric, PERRLA  Neck: No JVD, no masses, good movement. HJR to mid neck at 45 degrees that improves while continuing to apply pressure.  CV: RRR, S1 and S2 normal, no additional heart sounds or murmurs  Pulm: Lung sounds clear bilaterally without respiratory distress   GI: Abdomen soft, non-tender, good bowel sounds  Extremities: Extremities grossly normal, warm, well perfused. Minimal pitting edema.  Skin: No ecchymosis, erythema, or ulcers  Psych: AOx3, appropriate affect    Most Recent Lab Data:     CBC: "   Lab Results   Component Value Date    WBC 5.44 12/08/2024    HGB 10.5 (L) 12/08/2024    HCT 36.1 (L) 12/08/2024     (L) 12/08/2024    MCV 78 (L) 12/08/2024    RDW 16.8 (H) 12/08/2024     BMP:   Lab Results   Component Value Date     12/08/2024    K 4.5 12/08/2024     12/08/2024    CO2 22 (L) 12/08/2024    BUN 31 (H) 12/08/2024    CREATININE 2.2 (H) 12/08/2024     (H) 12/08/2024    CALCIUM 8.4 (L) 12/08/2024    MG 2.2 12/08/2024    PHOS 3.1 12/08/2024     LFTS;   Lab Results   Component Value Date    PROT 5.0 (L) 12/08/2024    ALBUMIN 2.5 (L) 12/08/2024    BILITOT 0.3 12/08/2024    AST 9 (L) 12/08/2024    ALKPHOS 51 12/08/2024    ALT <5 (L) 12/08/2024     COAGS:   Lab Results   Component Value Date    INR 1.3 (H) 12/07/2024     FLP:   Lab Results   Component Value Date    CHOL 172 12/07/2024    HDL 30 (L) 12/07/2024    LDLCALC 105.4 12/07/2024    TRIG 183 (H) 12/07/2024    CHOLHDL 17.4 (L) 12/07/2024     CARDIAC:   Lab Results   Component Value Date    TROPONINI 0.812 (H) 06/15/2024     (H) 06/25/2024         Cardiovascular Imaging:     Echo 12/7/24:    Left Ventricle: The left ventricle is normal in size. Ventricular mass is normal. Normal wall thickness. There is mildly reduced systolic function with a visually estimated ejection fraction of 45 - 50%. Grade III diastolic dysfunction. LV function may be undererstimated in the setting of frequent ectopy    Right Ventricle: Normal right ventricular cavity size. Wall thickness is normal. Right ventricle wall motion  is normal. Systolic function is mildly reduced.    Left Atrium: Left atrium is mildly dilated.    Aortic Valve: There is mild to moderate aortic regurgitation with a centrally directed jet.    Mitral Valve: There is moderate regurgitation with a centrally directed jet.    Tricuspid Valve: There is mild to moderate regurgitation with a centrally directed jet.    Pulmonary Artery: There is severe pulmonary hypertension. The  estimated pulmonary artery systolic pressure is 70 mmHg.    IVC/SVC: Normal venous pressure at 3 mmHg.    Pericardium: There is no pericardial effusion. Left pleural effusion.    Echo 6/16/24:    Left Ventricle: The left ventricle is normal in size. Ventricular mass is normal. Mild septal thickening. Regional wall motion abnormalities present. See diagram for wall motion findings. Septal motion is abnormal. There is normal systolic function with a visually estimated ejection fraction of 55 - 60%. Ejection fraction by visual approximation is 58%.    Right Ventricle: Normal right ventricular cavity size. Wall thickness is normal. Systolic function is normal.    Tricuspid Valve: There is mild regurgitation.    Pulmonary Artery: There is severe pulmonary hypertension. The estimated pulmonary artery systolic pressure is 72 mmHg.    IVC/SVC: Normal venous pressure at 3 mmHg.    Pericardium: There is a small posterior effusion at base and none to trivial otherwise    Pre-ablation FELICIANO 6/11/24:    FELICIANO prior to ablation. No evidence of intracardiac thrombus.    Left Ventricle: The left ventricle is normal in size. Normal wall thickness. There is low normal systolic function with a visually estimated ejection fraction of 50 - 55%.    Right Ventricle: Normal right ventricular cavity size. Wall thickness is normal. Systolic function is normal.    Left Atrium: Left atrium is dilated. The left atrial appendage appears normal. The left atrial appendage has a windsock morphology. Appendage velocity is reduced at less than 40 cm/sec. The pulmonary veins have systolic blunting. There is no thrombus in the cavity.    Right Atrium: Right atrium is dilated.    Mitral Valve: There is mild regurgitation.    Tricuspid Valve: There is mild regurgitation.    Aorta: Grade 3 atherosclerosis of the descending aorta and in the aortic arch with moderate thickening.    Pericardium: There is a small lateral effusion.    Cardiac event monitor  1/19/24:  Event Monitor    The baseline rhythm was sinus ith a rate of 69 bpm.    There were 32 patient activations without a specific symptom entered corresponding to sinus rhythm with an overall rate range of 51-74 bpm with occasional PACs.    There were 3 auto-activations. 2 corresponded to sinus rhythm with rates of 53-54 bpm. The other was consistent with sinus rhythm with a brief paroxysm of atrial fibrillation (nonsustained).       Assessment & Plan:     1. Chronic diastolic heart failure    2. Embolic stroke involving right middle cerebral artery    3. Paroxysmal A-fib    4. Atypical atrial flutter    5. Primary hypertension    6. Mixed hyperlipidemia    7. PAD (peripheral artery disease)    8. Type 2 diabetes mellitus with stage 3b chronic kidney disease, with long-term current use of insulin    9. Chronic diastolic congestive heart failure        #HFpEF  Does not appear to be in acute decompensated heart failure today. More likely symptomatic atrial flutter.  Most recent echo prior to this admission showed normal EF 55-60%  EF on this admission was 45-50%, but with frequent ectopy, so possibly underestimated  Prior 30 day event monitor showed occasional PACs    #Embolic CVA  #pAF  #Atrial flutter  EKG today shows Atrial flutter, controlled ventricular rate  Likely symptomatic with his atypical AFL  S/p CTI/PVI/LA PWI with Dr. Bowden in June 2024  Admitted for embolic R MCA stroke this month  Currently on Xarelto, appropriately renally dosed (CrCl < 50 on metabolic panels for months, on 15mg qd with Dinner)  Will send to EP and plan for cardioversion with follow up with Dr. Bowden    #HTN  Not controlled on valsartan 320 qd, Coreg 25 BID, hydralazine 100 q8h  Goal < 130/90  He was previously on HCTZ and Aldactone, but was reportedly non-compliant with them  Add HCTZ, continue BP diary    #HLD  #PAD   on atorvastatin 40 qd  Goal < 70  Increase lipitor to 80mg qd    #T2DM  Managed by PCP  Goal A1c <  7    #CKD3b  Stable, follows with txp nephrology      Discussed mediterranean diet  Discussed exercise therapy based on 150-min per week AHA recommendations for moderate intensity exercise/75 min for high-intensity exercise or as able    2-3 month follow up    This plan was formulated after discussion with Dr. Qamruddin Connor Gillies, DO, PGY-V  [facility]

## 2024-12-17 ENCOUNTER — TELEPHONE (OUTPATIENT)
Dept: ELECTROPHYSIOLOGY | Facility: CLINIC | Age: 69
End: 2024-12-17
Payer: MEDICARE

## 2024-12-17 DIAGNOSIS — I48.19 PERSISTENT ATRIAL FIBRILLATION: Primary | ICD-10-CM

## 2024-12-17 NOTE — TELEPHONE ENCOUNTER
Patient returning my call, offered his office visit with LELAND Lyons on 12/31 starting with EKG @ 8:00am, patient agreed to date and time.

## 2024-12-17 NOTE — TELEPHONE ENCOUNTER
----- Message from Med Assistant Sujatha sent at 12/16/2024  4:49 PM CST -----  Regarding: RE: Symptomatic Atrial Flutter  FYI I sent it to Pamela as well to help assist with the patient symptoms.    Thanks  ----- Message -----  From: Gillies, Connor M, DO  Sent: 12/16/2024   4:48 PM CST  To: [facility] Staff  Subject: Symptomatic Atrial Flutter                       Hi, This patient got a PVI/CTI/PWI with Dr. Bowden this summer and saw me in clinic today and we feel has symptomatic atypical atrial flutter. I'd like to get him in to see Dr. Bowden and if he agrees, schedule him for a cardioversion as an outpatient. He's compliant with his Xarelto.     Thanks,  Connor Gillies

## 2024-12-19 ENCOUNTER — OFFICE VISIT (OUTPATIENT)
Dept: NEPHROLOGY | Facility: CLINIC | Age: 69
End: 2024-12-19
Payer: MEDICARE

## 2024-12-19 VITALS
HEART RATE: 80 BPM | OXYGEN SATURATION: 99 % | HEIGHT: 73 IN | BODY MASS INDEX: 26.65 KG/M2 | WEIGHT: 201.06 LBS | SYSTOLIC BLOOD PRESSURE: 154 MMHG | DIASTOLIC BLOOD PRESSURE: 83 MMHG

## 2024-12-19 DIAGNOSIS — R80.9 PROTEINURIA, UNSPECIFIED TYPE: ICD-10-CM

## 2024-12-19 DIAGNOSIS — D63.8 ANEMIA OF CHRONIC DISEASE: ICD-10-CM

## 2024-12-19 DIAGNOSIS — N18.32 STAGE 3B CHRONIC KIDNEY DISEASE: Primary | ICD-10-CM

## 2024-12-19 DIAGNOSIS — Z79.899 IMMUNOCOMPROMISED STATE DUE TO DRUG THERAPY: ICD-10-CM

## 2024-12-19 DIAGNOSIS — I15.0 RENOVASCULAR HYPERTENSION: ICD-10-CM

## 2024-12-19 DIAGNOSIS — D84.821 IMMUNOCOMPROMISED STATE DUE TO DRUG THERAPY: ICD-10-CM

## 2024-12-19 DIAGNOSIS — Z94.0 S/P KIDNEY TRANSPLANT: ICD-10-CM

## 2024-12-19 PROCEDURE — 99999 PR PBB SHADOW E&M-EST. PATIENT-LVL IV: CPT | Mod: PBBFAC,HCNC,GC, | Performed by: STUDENT IN AN ORGANIZED HEALTH CARE EDUCATION/TRAINING PROGRAM

## 2024-12-19 NOTE — PROGRESS NOTES
Subjective     Chief Complaint: CKD 3    History of Present Illness:  Mr. Ravi Zamorano is a 69 y.o. male with known ESRD 2/2 DM & HTN and who was previously on HD and then obtained a DBD kidney transplant (), post-kidney transplant CKD 3b, Hepatitis C (treated ), afib s/p cardioversion (), chronic diastolic heart failure, diabetic neuropathy & retinopathy, BPH, erectile dysfunction, and anxiety/depression.             Kidney transplant (2016)  Donor Type:   - Brain Death  Donor CMV Status:  Positive  Donor HBcAB:  Negative  Donor HCV Status:  Positive  Transplant nephrologist: Dr. Sosa   Transplant medications: Tacrolimus 0.5mg daily daily, Prednisone 5mg daily, and Mycophenolate 250mg 2 caps twice daily   Last Tacrolimus level was 4.5 on 2022 at 0800         Office Visit 10/31/24--  He reports compliance with his immunosuppressive medications of  mg BID, prednisone 5 mg daily, and Tacrolimus 0.5 mg everyday. He reports continued foam in his urine, but denies any pain, hematuria, fevers, urinary retention, or ongoing edema. Admitted on  for Embolic R MCA infarct has some residual left hand weakness but no other deficits. Renal function remains stable, most recent tacrolimus levels is within desired range.     #CKD 3b-- Previously stable with creatinine in the 1.7 range, however he was hospitalized in May (symptomatic anemia and 2024 for fluid overload). After these hospitalizations, his creatinine increased to 2.2 and has stabilized around this 2.0 range. Repeat of DSA levels in  showed detection of Class 2 DSA.   Meds; Jardiance 10 mg Daily, Valsartan 320 mg Daily.     #Anemia in chronic renal disease-- On EPO, Iron studies in desired range, Hgb stable.    #DBD Kidney transplant -- Maintained on Tacrlimus 0.5mg Daily, Prednisone 5mg QD, and  mg BID.    #Bone Mineral Disease-- , Vitamin D 17, and 50,000 units of vitamin D through November  2024    #HTN-- Blood pressures at home in the 154s SBP. Allergy to CCBs due to tooth loss in the past with Nifedipine.   Meds; Valsartan 320 mg QD, Hydralazine 100 mg Q8H, Lasix 40 BID, HCTZ 12.5 mg QD, Jardiance 10 mg QD, Coreg 25 mg BID.     #HLD-- Most recent lipid profile showed LDL of 48, HDL 26, Triglycerides 187  Meds; Lipitor 40 mg QD    #Diabetes-- On insulin and Jardiance, A1C at 6.7    Review of Systems   Constitutional:  Negative for chills, diaphoresis, fever and weight loss.   Respiratory:  Negative for cough, shortness of breath and wheezing.    Cardiovascular:  Positive for claudication. Negative for chest pain, palpitations, orthopnea, leg swelling and PND.   Gastrointestinal:  Negative for abdominal pain, constipation, diarrhea, heartburn, nausea and vomiting.   Genitourinary:  Negative for dysuria and urgency.        Foamy urine   Musculoskeletal:  Negative for back pain, falls and neck pain.   Skin:  Negative for itching and rash.   Neurological:  Negative for dizziness, tremors, focal weakness, loss of consciousness, weakness and headaches.   Endo/Heme/Allergies:  Negative for polydipsia.       PAST HISTORY:     Past Medical History:   Diagnosis Date    Anticoagulant long-term use     Anxiety 07/29/2014    Arthritis     atrial fibrillation     Bilateral diabetic retinopathy 2017    Cardioembolic stroke 12/7/2024    CKD (chronic kidney disease) stage 2, GFR 60-89 ml/min 12/28/2016    CKD (chronic kidney disease) stage 4, GFR 15-29 ml/min 07/29/2014    Colon polyps 2014    Depression - situational 07/29/2014    Diabetes mellitus     Diabetes type 2 since 2000 07/29/2014    Diabetic neuropathy 07/29/2014    Encounter for blood transfusion     History of cardioversion 10/03/2019    History of hepatitis C, s/p successful Rx w/ SVR24 - 9/2017 07/29/2014    Genotype 1a, treatment naive 10/2014 liver biopsy - grade 1 / stage 1 Completed Harvoni w/ SVR    Hyperlipidemia 07/29/2014    Hypertension      Neuropathy     Organ transplant candidate 07/29/2014    Pancreatitis     S/P cadaveric kidney transplant 11/5/2016. ESRD secondary to HTN/DMII 11/05/2016    Stage 3a chronic kidney disease 12/28/2016    Type 2 diabetes mellitus with stage 3a chronic kidney disease, with long-term current use of insulin 07/29/2014       Past Surgical History:   Procedure Laterality Date    ABLATION OF ARRHYTHMOGENIC FOCUS FOR ATRIAL FIBRILLATION N/A 6/11/2024    Procedure: Ablation atrial fibrillation;  Surgeon: Bronson Bowden MD;  Location: [facility] EP LAB;  Service: Cardiology;  Laterality: N/A;  AF, FELICIANO (cx if SR), PVI, RFA, Carto, Gen, GP, 3 Prep    ABLATION, ATRIAL FLUTTER, TYPICAL  6/11/2024    Procedure: Ablation, Atrial Flutter, Typical;  Surgeon: Bronson Bowden MD;  Location: [facility] EP LAB;  Service: Cardiology;;    APPENDECTOMY      BONE MARROW BIOPSY Left 6/26/2024    Procedure: Biopsy-bone marrow;  Surgeon: Bridger Zapata MD;  Location: [facility] ENDO (4TH FLR);  Service: Oncology;  Laterality: Left;  6/24-pt knows to hold aspirin and eliquis as instructed by hem/onc, precall complete-Kpvt    CARDIOVERSION  6/11/2024    Procedure: Cardioversion;  Surgeon: Bronson Bowden MD;  Location: [facility] EP LAB;  Service: Cardiology;;    CATARACT EXTRACTION W/  INTRAOCULAR LENS IMPLANT Right 3/13/2024    Procedure: EXTRACTION, CATARACT, WITH IOL INSERTION;  Surgeon: Jennifer Palacio MD;  Location: [facility] OR;  Service: Ophthalmology;  Laterality: Right;    CATARACT EXTRACTION W/  INTRAOCULAR LENS IMPLANT Left 4/10/2024    Procedure: EXTRACTION, CATARACT, WITH IOL INSERTION;  Surgeon: Jennifer Palacio MD;  Location: [facility] OR;  Service: Ophthalmology;  Laterality: Left;    COLONOSCOPY N/A 12/21/2020    Procedure: COLONOSCOPY;  Surgeon: Tico Bell MD;  Location: [facility] ENDO (4TH FLR);  Service: Endoscopy;  Laterality: N/A;  ok to hold eliquis per Dr. Valadez, see telephone encounter 11/13/2020-MS  covid test 12/18 [place]    KIDNEY  TRANSPLANT      TRANSESOPHAGEAL ECHOCARDIOGRAPHY N/A 2019    Procedure: ECHOCARDIOGRAM, TRANSESOPHAGEAL;  Surgeon: Dolores Diagnostic Provider;  Location: [facility] EP LAB;  Service: Cardiology;  Laterality: N/A;    TRANSESOPHAGEAL ECHOCARDIOGRAPHY N/A 2024    Procedure: ECHOCARDIOGRAM, TRANSESOPHAGEAL;  Surgeon: Grayson Lin III, MD;  Location: [facility] EP LAB;  Service: Cardiology;  Laterality: N/A;    TREATMENT OF CARDIAC ARRHYTHMIA N/A 2019    Procedure: CARDIOVERSION;  Surgeon: Bronson Bowden MD;  Location: [facility] EP LAB;  Service: Cardiology;  Laterality: N/A;  AF, FELICIANO, DCCV, MAC, GP, 3 PREP       Family History   Problem Relation Name Age of Onset    Diabetes Mother      Hypertension Mother      Heart disease Mother          CAD with PCI    Heart disease Father      Cancer Brother 2         one sister with breast cancer    Hypertension Brother 2         one sister with HTN and borderline DM    Stroke Neg Hx      Kidney disease Neg Hx      Colon cancer Neg Hx      Esophageal cancer Neg Hx         Social History     Socioeconomic History    Marital status:    Occupational History     Employer: [employer]   Tobacco Use    Smoking status: Former     Current packs/day: 0.00     Average packs/day: 0.5 packs/day for 32.0 years (16.0 ttl pk-yrs)     Types: Cigarettes     Start date: 1984     Quit date: 2016     Years since quittin.0    Smokeless tobacco: Never    Tobacco comments:     Pt reports that he quit in , but started up again in . pt reports he is currently working on quitting again   Substance and Sexual Activity    Alcohol use: Yes     Comment: Pt reports occasional beers on Sundays. Pt reports drinking daily prior to ESRD diagnosis.    Drug use: No    Sexual activity: Yes     Partners: Female   Social History Narrative     for 14 years     for 38 years    2 children ages 41, 42     Social Drivers of Health     Financial Resource Strain:  Low Risk  (12/7/2024)    Overall Financial Resource Strain (CARDIA)     Difficulty of Paying Living Expenses: Not hard at all   Food Insecurity: No Food Insecurity (12/7/2024)    Hunger Vital Sign     Worried About Running Out of Food in the Last Year: Never true     Ran Out of Food in the Last Year: Never true   Transportation Needs: No Transportation Needs (12/7/2024)    TRANSPORTATION NEEDS     Transportation : No   Physical Activity: Inactive (5/3/2024)    Exercise Vital Sign     Days of Exercise per Week: 0 days     Minutes of Exercise per Session: 0 min   Stress: No Stress Concern Present (12/7/2024)    [employer] [place] [employer] - Occupational Stress Questionnaire     Feeling of Stress : Not at all   Housing Stability: Low Risk  (12/7/2024)    Housing Stability Vital Sign     Unable to Pay for Housing in the Last Year: No     Homeless in the Last Year: No       MEDICATIONS & ALLERGIES:     Current Outpatient Medications on File Prior to Visit   Medication Sig    atorvastatin (LIPITOR) 80 MG tablet Take 1 tablet (80 mg total) by mouth once daily.    blood sugar diagnostic Strp Use to check blood glucose 1 times daily, to use with insurance preferred meter    blood-glucose meter Misc Use to check blood glucose 1 times daily, to use with insurance preferred meter    blood-glucose sensor Kayla Gin 3, use as directed, change every 14 days , e 11.65    carvediloL (COREG) 25 MG tablet Take 1 tablet (25 mg total) by mouth 2 (two) times daily.    doxazosin (CARDURA) 1 MG tablet Take 1 tablet (1 mg total) by mouth every evening.    empagliflozin (JARDIANCE) 10 mg tablet Take 1 tablet (10 mg total) by mouth once daily.    ergocalciferol (ERGOCALCIFEROL) 50,000 unit Cap Take 1 capsule (50,000 Units total) by mouth every 7 days.    furosemide (LASIX) 40 MG tablet Take 2 tablets (80 mg total) by mouth 2 (two) times a day.    gabapentin (NEURONTIN) 300 MG capsule Take 1 capsule by mouth in the morning, 1  "capsule midday, and 3 capsules at night.    glucagon (GVOKE HYPOPEN 2-PACK) 1 mg/0.2 mL AtIn Inject 0.2 mLs into the skin as needed (severe hypoglycemia).    hydrALAZINE (APRESOLINE) 100 MG tablet Take 1 tablet (100 mg total) by mouth every 8 (eight) hours.    hydroCHLOROthiazide (HYDRODIURIL) 12.5 MG Tab Take 1 tablet (12.5 mg total) by mouth once daily.    influenza, adjuvanted, (FLUAD TRIV 2024-25,65Y UP,,PF,) 45 mcg (15 mcg x 3)/0.5 mL IM vaccine (> or = 64 yo) Inject into the muscle.    insulin aspart U-100 (NOVOLOG) 100 unit/mL (3 mL) InPn pen Inject 10 units under the skin w/ breakfast, 7 units at lunch and dinner. Scale 180-230+2, 231-280+4, 281-330+6, 331-380+8, >380+10.  Max daily 57 units.    insulin glargine U-100, Lantus, (LANTUS SOLOSTAR U-100 INSULIN) 100 unit/mL (3 mL) InPn pen Inject 20 Units into the skin every morning.    lancets 30 gauge Misc Use to check blood glucose 1 times daily, to use with insurance preferred meter    magnesium oxide (MAG-OX) 400 mg (241.3 mg magnesium) tablet Take 1 tablet (400 mg total) by mouth 2 (two) times daily.    meclizine (ANTIVERT) 25 mg tablet Take 1 tablet (25 mg total) by mouth 3 (three) times daily as needed for Dizziness.    mycophenolate (CELLCEPT) 250 mg Cap Take 2 capsules (500 mg total) by mouth 2 (two) times daily.    pen needle, diabetic (BD ULTRA-FINE LO PEN NEEDLE) 32 gauge x 5/32" Ndle USE TO ADMINISTER INSULIN 4 TIMES DAILY.    predniSONE (DELTASONE) 5 MG tablet Take 1 tablet (5 mg total) by mouth once daily.    rivaroxaban (XARELTO) 15 mg Tab Take 1 tablet (15 mg total) by mouth daily with dinner or evening meal.    tacrolimus (PROGRAF) 0.5 MG Cap Take 1 capsule (0.5 mg total) by mouth once daily. Start tomorrow    valsartan (DIOVAN) 320 MG tablet Take 1 tablet (320 mg total) by mouth once daily.    [DISCONTINUED] insulin lispro (HUMALOG KWIKPEN INSULIN) 100 unit/mL pen Inject 18 units w/ breakfast, 16 units w/ lunch and dinner plus scale 150-200 " "+2, 201-250 +4, 251-300 +6, 301-350 +8.     Current Facility-Administered Medications on File Prior to Visit   Medication    balanced salt irrigation intra-ocular solution 1 drop    epoetin tanya injection 4,000 Units    phenylephrine HCL 10% ophthalmic solution 1 drop    proparacaine 0.5 % ophthalmic solution 1 drop    sodium chloride 0.9% flush 10 mL    TETRAcaine HCl (PF) 0.5 % Drop 1 drop       Review of patient's allergies indicates:   Allergen Reactions    Nifedipine Other (See Comments)     Gingival hyperplasia       OBJECTIVE:     Vital Signs:  Vitals:    12/19/24 1324   BP: (!) 154/83   Patient Position: Sitting   Pulse: 80   SpO2: 99%   Weight: 91.2 kg (201 lb 1 oz)   Height: 6' 1" (1.854 m)           Body mass index is 26.53 kg/m².     Physical Exam  Constitutional:       General: He is not in acute distress.     Appearance: Normal appearance. He is not ill-appearing or toxic-appearing.   HENT:      Head: Atraumatic.      Right Ear: External ear normal.      Left Ear: External ear normal.      Nose: Nose normal.      Mouth/Throat:      Mouth: Mucous membranes are moist.   Eyes:      Extraocular Movements: Extraocular movements intact.   Cardiovascular:      Rate and Rhythm: Normal rate and regular rhythm.      Pulses: Normal pulses.      Heart sounds: Normal heart sounds. No murmur heard.  Pulmonary:      Effort: Pulmonary effort is normal. No respiratory distress.      Breath sounds: Normal breath sounds. No wheezing or rales.   Chest:      Chest wall: No tenderness.   Abdominal:      General: Abdomen is flat.      Palpations: Abdomen is soft.   Musculoskeletal:         General: No swelling. Normal range of motion.      Cervical back: Normal range of motion.      Right lower leg: No edema.      Left lower leg: No edema.   Skin:     General: Skin is warm.      Capillary Refill: Capillary refill takes less than 2 seconds.      Coloration: Skin is not jaundiced.      Findings: No lesion or rash. "   Neurological:      General: No focal deficit present.      Mental Status: He is alert and oriented to person, place, and time. Mental status is at baseline.      Cranial Nerves: No cranial nerve deficit.      Motor: No weakness.   Psychiatric:         Mood and Affect: Mood normal.         Behavior: Behavior normal.         Laboratory  No results found for this or any previous visit (from the past 24 hours).    Diagnostic Results:      Health Maintenance Due   Topic Date Due    COVID-19 Vaccine (8 - 2024-25 season) 11/14/2024         ASSESSMENT & PLAN:   Mr. Ravi Zamorano is a 69 y.o. male post renal transplant in 2016 who presents to the office for follow up of CKD. He has had stable renal function over the past 18 months, with his creatinine at 1.7, however in June 2024 he had an acute rise in creatinine to 2.7 in the setting of volume overload and symptomatic anemia. Proteinuria gradually increasing. He denies missing any immunosuppressive medications but did have an elevated trough and his tacrolimus was reduced to every other day dosing before increasing again to 0.5 mg daily dosing. A1C is elevated at 6.8 in May and he reports his blood pressures mostly in the 150's systolic.    Stage 3b chronic kidney disease  -     Protein / creatinine ratio, urine; Future; Expected date: 12/19/2024  -     Renal Function Panel; Future; Expected date: 12/19/2024  -     CBC Without Differential; Future; Expected date: 12/19/2024  -     Urinalysis, Reflex to Urine Culture Urine, Clean Catch    S/P cadaveric kidney transplant 11/5/2016. ESRD secondary to HTN/DMII    Immunocompromised state due to drug therapy  -Continue current dose of tacrolimus, MMF, and prednisone.     Anemia of chronic disease  -Continue EPO to maint Hgb above 10.0  -Iron studies are reviewed and acceptable.     Renovascular hypertension  -BP is elevated at home, 140s/80s, however patient also recently suffered a TIA. In the setting of acute stroke/TIA  will allow mild permissive hypertension and attempt to normalize his BP over the next 1-2 months.  -I have asked him send me weekly readings of his blood pressures at home.     Proteinuria, unspecified type  -Rechecking UPCR now, discussed starting GLP-1 RA now. Patient was previously on Ozempic and reports weight loss on Ozempic and is concerned about further weight loss if GLP-1 RA is resumed.         RTC in 3 months    Discussed with Dr. Horne - staff attestation to follow      Francesco Lopez MD  Nephrology PGY-5    [address] [place] [address]  [phone]

## 2024-12-20 ENCOUNTER — LAB VISIT (OUTPATIENT)
Dept: LAB | Facility: HOSPITAL | Age: 69
End: 2024-12-20
Attending: STUDENT IN AN ORGANIZED HEALTH CARE EDUCATION/TRAINING PROGRAM
Payer: MEDICARE

## 2024-12-20 DIAGNOSIS — N18.32 STAGE 3B CHRONIC KIDNEY DISEASE: ICD-10-CM

## 2024-12-20 LAB
ALBUMIN SERPL BCP-MCNC: 2.8 G/DL (ref 3.5–5.2)
ANION GAP SERPL CALC-SCNC: 9 MMOL/L (ref 8–16)
BUN SERPL-MCNC: 30 MG/DL (ref 8–23)
CALCIUM SERPL-MCNC: 9 MG/DL (ref 8.7–10.5)
CHLORIDE SERPL-SCNC: 102 MMOL/L (ref 95–110)
CO2 SERPL-SCNC: 25 MMOL/L (ref 23–29)
CREAT SERPL-MCNC: 2.3 MG/DL (ref 0.5–1.4)
ERYTHROCYTE [DISTWIDTH] IN BLOOD BY AUTOMATED COUNT: 16.7 % (ref 11.5–14.5)
EST. GFR  (NO RACE VARIABLE): 30.4 ML/MIN/1.73 M^2
GLUCOSE SERPL-MCNC: 137 MG/DL (ref 70–110)
HCT VFR BLD AUTO: 37 % (ref 40–54)
HGB BLD-MCNC: 10.5 G/DL (ref 14–18)
MCH RBC QN AUTO: 22.6 PG (ref 27–31)
MCHC RBC AUTO-ENTMCNC: 28.4 G/DL (ref 32–36)
MCV RBC AUTO: 80 FL (ref 82–98)
PHOSPHATE SERPL-MCNC: 3.3 MG/DL (ref 2.7–4.5)
PLATELET # BLD AUTO: 154 K/UL (ref 150–450)
PMV BLD AUTO: ABNORMAL FL (ref 9.2–12.9)
POTASSIUM SERPL-SCNC: 3.7 MMOL/L (ref 3.5–5.1)
RBC # BLD AUTO: 4.64 M/UL (ref 4.6–6.2)
SODIUM SERPL-SCNC: 136 MMOL/L (ref 136–145)
WBC # BLD AUTO: 4.71 K/UL (ref 3.9–12.7)

## 2024-12-20 PROCEDURE — 36415 COLL VENOUS BLD VENIPUNCTURE: CPT | Mod: HCNC,PN | Performed by: STUDENT IN AN ORGANIZED HEALTH CARE EDUCATION/TRAINING PROGRAM

## 2024-12-20 PROCEDURE — 80069 RENAL FUNCTION PANEL: CPT | Mod: HCNC | Performed by: STUDENT IN AN ORGANIZED HEALTH CARE EDUCATION/TRAINING PROGRAM

## 2024-12-20 PROCEDURE — 85027 COMPLETE CBC AUTOMATED: CPT | Mod: HCNC | Performed by: STUDENT IN AN ORGANIZED HEALTH CARE EDUCATION/TRAINING PROGRAM

## 2024-12-30 ENCOUNTER — LAB VISIT (OUTPATIENT)
Dept: LAB | Facility: HOSPITAL | Age: 69
End: 2024-12-30
Attending: STUDENT IN AN ORGANIZED HEALTH CARE EDUCATION/TRAINING PROGRAM
Payer: MEDICARE

## 2024-12-30 DIAGNOSIS — I10 PRIMARY HYPERTENSION: ICD-10-CM

## 2024-12-30 DIAGNOSIS — D63.8 ANEMIA OF CHRONIC DISEASE: ICD-10-CM

## 2024-12-30 DIAGNOSIS — I50.32 CHRONIC DIASTOLIC HEART FAILURE: ICD-10-CM

## 2024-12-30 LAB
ANION GAP SERPL CALC-SCNC: 9 MMOL/L (ref 8–16)
BUN SERPL-MCNC: 30 MG/DL (ref 8–23)
CALCIUM SERPL-MCNC: 8.7 MG/DL (ref 8.7–10.5)
CHLORIDE SERPL-SCNC: 99 MMOL/L (ref 95–110)
CO2 SERPL-SCNC: 27 MMOL/L (ref 23–29)
CREAT SERPL-MCNC: 2.3 MG/DL (ref 0.5–1.4)
EST. GFR  (NO RACE VARIABLE): 30.4 ML/MIN/1.73 M^2
GLUCOSE SERPL-MCNC: 148 MG/DL (ref 70–110)
HCT VFR BLD AUTO: 35.9 % (ref 40–54)
HGB BLD-MCNC: 10.1 G/DL (ref 14–18)
IRON SERPL-MCNC: 65 UG/DL (ref 45–160)
POTASSIUM SERPL-SCNC: 4 MMOL/L (ref 3.5–5.1)
SATURATED IRON: 33 % (ref 20–50)
SODIUM SERPL-SCNC: 135 MMOL/L (ref 136–145)
TOTAL IRON BINDING CAPACITY: 197 UG/DL (ref 250–450)
TRANSFERRIN SERPL-MCNC: 133 MG/DL (ref 200–375)

## 2024-12-30 PROCEDURE — 36415 COLL VENOUS BLD VENIPUNCTURE: CPT | Mod: HCNC,PN | Performed by: STUDENT IN AN ORGANIZED HEALTH CARE EDUCATION/TRAINING PROGRAM

## 2024-12-30 PROCEDURE — 85014 HEMATOCRIT: CPT | Mod: HCNC | Performed by: STUDENT IN AN ORGANIZED HEALTH CARE EDUCATION/TRAINING PROGRAM

## 2024-12-30 PROCEDURE — 80048 BASIC METABOLIC PNL TOTAL CA: CPT | Mod: HCNC | Performed by: STUDENT IN AN ORGANIZED HEALTH CARE EDUCATION/TRAINING PROGRAM

## 2024-12-30 PROCEDURE — 83540 ASSAY OF IRON: CPT | Mod: HCNC | Performed by: STUDENT IN AN ORGANIZED HEALTH CARE EDUCATION/TRAINING PROGRAM

## 2024-12-30 PROCEDURE — 85018 HEMOGLOBIN: CPT | Mod: HCNC | Performed by: STUDENT IN AN ORGANIZED HEALTH CARE EDUCATION/TRAINING PROGRAM

## 2025-01-02 ENCOUNTER — TELEPHONE (OUTPATIENT)
Dept: CARDIOLOGY | Facility: CLINIC | Age: 70
End: 2025-01-02
Payer: MEDICARE

## 2025-01-02 NOTE — TELEPHONE ENCOUNTER
Pt was rescheduled from a 3 mths f/u to a one month f/u apparently erroneously w. ms Jung. Spoke w. pt. He denied any issue and actually did not know about the appt. I talked to him about his BP and he said that his BP was up and down. He does not know what his BP is as he has 2 bp cuffs that don't work on him (but one works on his wife)> As he is scheduled in Am w. dr Mack, I told him to bring both cuffs to appt and address bp issue w. her. He should contact us if dr Mack wants him to f/u w. cards. Pt verbalized understanding. I rescheduled him in March with Ms Vital and he agreed to date/time of appointment(s). Reminder mailed as requested.

## 2025-01-03 ENCOUNTER — OFFICE VISIT (OUTPATIENT)
Dept: INTERNAL MEDICINE | Facility: CLINIC | Age: 70
End: 2025-01-03
Payer: MEDICARE

## 2025-01-03 VITALS
WEIGHT: 200.5 LBS | BODY MASS INDEX: 26.57 KG/M2 | HEART RATE: 76 BPM | OXYGEN SATURATION: 100 % | HEIGHT: 73 IN | DIASTOLIC BLOOD PRESSURE: 78 MMHG | SYSTOLIC BLOOD PRESSURE: 148 MMHG

## 2025-01-03 DIAGNOSIS — I48.4 ATYPICAL ATRIAL FLUTTER: ICD-10-CM

## 2025-01-03 DIAGNOSIS — E11.22 TYPE 2 DIABETES MELLITUS WITH STAGE 3A CHRONIC KIDNEY DISEASE, WITH LONG-TERM CURRENT USE OF INSULIN: ICD-10-CM

## 2025-01-03 DIAGNOSIS — E11.22 TYPE 2 DIABETES MELLITUS WITH STAGE 3B CHRONIC KIDNEY DISEASE, WITH LONG-TERM CURRENT USE OF INSULIN: ICD-10-CM

## 2025-01-03 DIAGNOSIS — E78.2 MIXED HYPERLIPIDEMIA: ICD-10-CM

## 2025-01-03 DIAGNOSIS — I63.411 EMBOLIC STROKE INVOLVING RIGHT MIDDLE CEREBRAL ARTERY: Primary | ICD-10-CM

## 2025-01-03 DIAGNOSIS — Z94.0 S/P KIDNEY TRANSPLANT: ICD-10-CM

## 2025-01-03 DIAGNOSIS — N18.31 TYPE 2 DIABETES MELLITUS WITH STAGE 3A CHRONIC KIDNEY DISEASE, WITH LONG-TERM CURRENT USE OF INSULIN: ICD-10-CM

## 2025-01-03 DIAGNOSIS — I10 PRIMARY HYPERTENSION: ICD-10-CM

## 2025-01-03 DIAGNOSIS — N18.32 TYPE 2 DIABETES MELLITUS WITH STAGE 3B CHRONIC KIDNEY DISEASE, WITH LONG-TERM CURRENT USE OF INSULIN: ICD-10-CM

## 2025-01-03 DIAGNOSIS — Z79.4 TYPE 2 DIABETES MELLITUS WITH STAGE 3B CHRONIC KIDNEY DISEASE, WITH LONG-TERM CURRENT USE OF INSULIN: ICD-10-CM

## 2025-01-03 DIAGNOSIS — Z79.4 TYPE 2 DIABETES MELLITUS WITH STAGE 3A CHRONIC KIDNEY DISEASE, WITH LONG-TERM CURRENT USE OF INSULIN: ICD-10-CM

## 2025-01-03 PROBLEM — N18.4 CHRONIC KIDNEY DISEASE (CKD), STAGE IV (SEVERE): Status: RESOLVED | Noted: 2024-11-21 | Resolved: 2025-01-03

## 2025-01-03 PROBLEM — I50.9 CHF (CONGESTIVE HEART FAILURE): Status: RESOLVED | Noted: 2024-12-07 | Resolved: 2025-01-03

## 2025-01-03 PROCEDURE — 99999 PR PBB SHADOW E&M-EST. PATIENT-LVL V: CPT | Mod: PBBFAC,HCNC,, | Performed by: INTERNAL MEDICINE

## 2025-01-03 NOTE — PROGRESS NOTES
"Subjective:       Patient ID: Ravi Zamorano is a 69 y.o. male.    Chief Complaint: Hospital Follow Up (Minor stroke)    HPI  69 y.o. male presents for a hospital follow up visit. I have reviewed discharge summary as well as all relevant laboratory and pathology results and imaging.     Patient was admitted 12/7/24 to 12/8/24 with left sided weakness. Found to have subcentimeter embolic right MCA infarct.   Echo w worsening EF 45-50%, compensated.   Xarelto, atorvastatin 40mg -> 80mg  He has HTN, DM, and a fib.     Valsartan 320mg  Hctz 12.5mg - added 2 weeks ago  Hydralazine 100mg q8h  Carvedilol 25mg bid  Lasix 80mg bid  Nifedipine stopped due to gingival hyperplasia  Never misses am or pm meds; occasional misses midday hydralazine.    [facility]  Lab Results   Component Value Date    HGBA1C 8.1 (H) 12/07/2024    HGBA1C 7.4 (H) 11/19/2024    HGBA1C 6.7 (H) 07/11/2024     Lantus 20 u qam - lately doing 15 un due to low BG in am.   Novolog 10bf; 7u lunch and dinner + sliding scale  Jardiance    P a fib and a flutter  plan for cardioversion with follow up with Dr. Bowden   CKD3a  Gfr 30  Cr 2.3    Review of Systems   Constitutional:  Negative for fever.   Respiratory:  Negative for shortness of breath.    Cardiovascular:  Negative for chest pain.   Musculoskeletal: Negative.    Skin: Negative.        Objective:   BP (!) 148/78   Pulse 76   Ht 6' 1" (1.854 m)   Wt 90.9 kg (200 lb 8.1 oz)   SpO2 100%   BMI 26.45 kg/m²      Physical Exam  Constitutional:       General: He is not in acute distress.     Appearance: He is well-developed. He is not diaphoretic.   HENT:      Head: Normocephalic and atraumatic.   Cardiovascular:      Rate and Rhythm: Normal rate and regular rhythm.      Heart sounds: No murmur heard.  Pulmonary:      Effort: Pulmonary effort is normal. No respiratory distress.      Breath sounds: No wheezing or rales.   Skin:     General: Skin is warm and dry.   Neurological:      Mental " Status: He is alert and oriented to person, place, and time.      Comments: Very slight left handed weakness   Psychiatric:         Behavior: Behavior normal.         Assessment:       1. Embolic stroke involving right middle cerebral artery    2. Type 2 diabetes mellitus with stage 3a chronic kidney disease, with long-term current use of insulin    3. Type 2 diabetes mellitus with stage 3b chronic kidney disease, with long-term current use of insulin    4. Atypical atrial flutter    5. S/P cadaveric kidney transplant 11/5/2016. ESRD secondary to HTN/DMII    6. Primary hypertension    7. Mixed hyperlipidemia        Plan:       Embolic stroke involving right middle cerebral artery  Continue current regimen  Deficit has almost completely resolved - very mild left hand weakness    Type 2 diabetes mellitus with stage 3a chronic kidney disease, with long-term current use of insulin  -     empagliflozin (JARDIANCE) 10 mg tablet; Take 1 tablet (10 mg total) by mouth once daily.  Dispense: 90 tablet; Refill: 0    Type 2 diabetes mellitus with stage 3b chronic kidney disease, with long-term current use of insulin  Atypical atrial flutter  S/P cadaveric kidney transplant 11/5/2016. ESRD secondary to HTN/DMII  Primary hypertension  Mixed hyperlipidemia  - stable and controlled  - continue current regimen          You are up to date for your primary preventive health care, and there are no reminders at this time.       Sooner appt dom Lopez? Has been titrating his insulin

## 2025-01-03 NOTE — Clinical Note
Douglas Russo, Can Mr. Zamorano possibly see you sooner than currently scheduled? He reports he was having some low BG and titrated his lantus from 20u to 15 u though his last A1c was 8%. He recently had a small stroke (thankfully only very mild left hand weakness residually.) Thanks!

## 2025-01-06 NOTE — PROGRESS NOTES
Mr. Zamorano is a patient of Dr. Bowden and was last seen in clinic 9/19/2024.      Subjective:   Patient ID:  Ravi Zamorano is a 69 y.o. male who presents for follow up of Atrial Fibrillation  .     HPI:    Mr. Zamorano is a 69 y.o. male with DM, kidney tx (2016), HFpEF, AF/AFL (PVI/PWI/CTI 6/2024), CVA (12/7/2024) here for follow up.    Background:    Ravi Zamorano has a history of DM on insulin, CKD2, history of kidney transplant in 2016 (Cr 1.5 in 2/2021), and diastolic dysfunction, who was diagnosed with rate controlled AF in 8/2019, manifesting as worsening DOSS. An echo done in 8/2019 showed an EF of 55% and severe LAE (MINA 54.3 mL/m2). He underwent DCCV in 8/2010, and was started on flecainide 100 mg bid post-procedure.    An echo in 6/2021 showed an EF of 55%. A 2 week Gigabit Squaredy DX monitor done in 7/2019 showed no arrhythmias.    At his 4/2023 visit, he was maintaining sinus rhythm but reporting more LH and DOSS. QRS wide on ECG--flecainide reduced to 50 mg bid. By 5/2023 QRS had narrowed, although he had an episode of syncope in 5/2023 thought to be due to hypotension 2/2 chlorthalidone.      SPECT 5/15/2023 negative for ischemia or scar. Event monitor 1/2024--one episode of AF noted 12/24/2023, and episodes of SR and junctional rhythm with HR ranging from 54-74.     At 2/2024 pt was in sinus rhythm. Reported chronic DOSS. Episodes of LH when hypotensive. Creatinine of 1.7 in 11/2023.  ms.    Pt seen by cardiology in 4/2024--back in AF. Presented to ED in 5/2024 with rate controlled AF and worsening HF sx..     Admitted in 4/2024 for anemia (Hg 5.7). GI thought it was low likelihood this was 2/2 GIB. Awaiting outpatient hematology input. Back on eliquis. Hg stable over the past month or so.    ADHF admission on 5/2024.     5/6/2024: In summary, Ravi Zamorano is a 68 year old male with persistent AF, who underwent DCCV and flecainide initiation in 8/2019. Over the past several months he has had  recurrent symptomatic AF.  Plan for PVI. Hold eliquis AM of procedure. FELICIANO--cx if sinus. Post-procedure will initiate a brief course of antiarrhythmics. Will also check for stable Hg in the weeks leading up to procedure, and postpone if Hg drifts down or if work-up identifies a source/problem that needs to be addressed.    6/11/2024: DCCV prior to ablation. PVI. LA posterior wall isolation. CTI-line.    9/19/2024: Pt is 3 mo s/p PVI/PWI/CTI-line. He is doing well from a rhythm standpoint, with no documented or symptomatic recurrence of arrhythmia since procedure.  He recently stopped his multaq. Not on xarelto due to expense. Discussed that his CHADSVASc is 4 and it is important to restart OAC for CVA prophylaxis. He does not want to start warfarin.  Xarelto prescription is at specialty pharmacy. He and his wife agreed to go downstairs and discuss available options to get him through the donut hole. Alternative he would need to go on warfarin.  His BP is very high (188/70). He says it was low this morning. Has not taken any of his BP meds today. Advised him to take his meds as soon as he gets home and monitor for appropriate BP decrease. ED if SBP>185 despite meds. He follows with nephrology.    Update (01/07/2025):    Patient was admitted 12/7/24 to 12/8/24 with left sided weakness. Found to have subcentimeter embolic right MCA infarct.     12/17/2024: Hi, This patient got a PVI/CTI/PWI with Dr. Bowden this summer and saw me in clinic today and we feel has symptomatic atypical atrial flutter. I'd like to get him in to see Dr. Bowden and if he agrees, schedule him for a cardioversion as an outpatient. He's compliant with his Xarelto.     Today he says is is recovering from stroke. Mild DOSS. No CP, palps. Edema relatively controlled currently.    Pt is on xarelto 15mg daily (CKD). On carvedilol 25mg BID. Creatinine 2.3 on 12/30/2024.    I have personally reviewed the patient's EKG today, which shows AFL at 75bpm. QRS  is 112. QTc is 431.    Relevant Cardiac Test Results:    2D Echo (12/7/2024):    Left Ventricle: The left ventricle is normal in size. Ventricular mass is normal. Normal wall thickness. There is mildly reduced systolic function with a visually estimated ejection fraction of 45 - 50%. Grade III diastolic dysfunction. LV function may be undererstimated in the setting of frequent ectopy    Right Ventricle: Normal right ventricular cavity size. Wall thickness is normal. Right ventricle wall motion  is normal. Systolic function is mildly reduced.    Left Atrium: Left atrium is mildly dilated.    Aortic Valve: There is mild to moderate aortic regurgitation with a centrally directed jet.    Mitral Valve: There is moderate regurgitation with a centrally directed jet.    Tricuspid Valve: There is mild to moderate regurgitation with a centrally directed jet.    Pulmonary Artery: There is severe pulmonary hypertension. The estimated pulmonary artery systolic pressure is 70 mmHg.    IVC/SVC: Normal venous pressure at 3 mmHg.    Pericardium: There is no pericardial effusion. Left pleural effusion.    Current Outpatient Medications   Medication Sig    atorvastatin (LIPITOR) 80 MG tablet Take 1 tablet (80 mg total) by mouth once daily.    blood sugar diagnostic Strp Use to check blood glucose 1 times daily, to use with insurance preferred meter    blood-glucose meter Misc Use to check blood glucose 1 times daily, to use with insurance preferred meter    blood-glucose sensor Kayla Gin 3, use as directed, change every 14 days , e 11.65    carvediloL (COREG) 25 MG tablet Take 1 tablet (25 mg total) by mouth 2 (two) times daily.    doxazosin (CARDURA) 1 MG tablet Take 1 tablet (1 mg total) by mouth every evening.    empagliflozin (JARDIANCE) 10 mg tablet Take 1 tablet (10 mg total) by mouth once daily.    ergocalciferol (ERGOCALCIFEROL) 50,000 unit Cap Take 1 capsule (50,000 Units total) by mouth every 7 days.    furosemide (LASIX)  "40 MG tablet Take 2 tablets (80 mg total) by mouth 2 (two) times a day.    gabapentin (NEURONTIN) 300 MG capsule Take 1 capsule by mouth in the morning, 1 capsule midday, and 3 capsules at night.    glucagon (GVOKE HYPOPEN 2-PACK) 1 mg/0.2 mL AtIn Inject 0.2 mLs into the skin as needed (severe hypoglycemia).    hydrALAZINE (APRESOLINE) 100 MG tablet Take 1 tablet (100 mg total) by mouth every 8 (eight) hours.    hydroCHLOROthiazide (HYDRODIURIL) 12.5 MG Tab Take 1 tablet (12.5 mg total) by mouth once daily.    insulin aspart U-100 (NOVOLOG) 100 unit/mL (3 mL) InPn pen Inject 10 units under the skin w/ breakfast, 7 units at lunch and dinner. Scale 180-230+2, 231-280+4, 281-330+6, 331-380+8, >380+10.  Max daily 57 units.    insulin glargine U-100, Lantus, (LANTUS SOLOSTAR U-100 INSULIN) 100 unit/mL (3 mL) InPn pen Inject 20 Units into the skin every morning.    lancets 30 gauge Misc Use to check blood glucose 1 times daily, to use with insurance preferred meter    magnesium oxide (MAG-OX) 400 mg (241.3 mg magnesium) tablet Take 1 tablet (400 mg total) by mouth 2 (two) times daily.    meclizine (ANTIVERT) 25 mg tablet Take 1 tablet (25 mg total) by mouth 3 (three) times daily as needed for Dizziness.    mycophenolate (CELLCEPT) 250 mg Cap Take 2 capsules (500 mg total) by mouth 2 (two) times daily.    pen needle, diabetic (BD ULTRA-FINE LO PEN NEEDLE) 32 gauge x 5/32" Ndle USE TO ADMINISTER INSULIN 4 TIMES DAILY.    predniSONE (DELTASONE) 5 MG tablet Take 1 tablet (5 mg total) by mouth once daily.    rivaroxaban (XARELTO) 15 mg Tab Take 1 tablet (15 mg total) by mouth daily with dinner or evening meal.    tacrolimus (PROGRAF) 0.5 MG Cap Take 1 capsule (0.5 mg total) by mouth once daily. Start tomorrow    valsartan (DIOVAN) 320 MG tablet Take 1 tablet (320 mg total) by mouth once daily.     Current Facility-Administered Medications   Medication    epoetin tanya injection 4,000 Units     Facility-Administered " "Medications Ordered in Other Visits   Medication    balanced salt irrigation intra-ocular solution 1 drop    phenylephrine HCL 10% ophthalmic solution 1 drop    proparacaine 0.5 % ophthalmic solution 1 drop    sodium chloride 0.9% flush 10 mL    TETRAcaine HCl (PF) 0.5 % Drop 1 drop       Review of Systems   Constitutional: Negative for malaise/fatigue.   Cardiovascular:  Positive for dyspnea on exertion. Negative for chest pain, irregular heartbeat, leg swelling and palpitations.   Respiratory:  Negative for shortness of breath.    Hematologic/Lymphatic: Negative for bleeding problem.   Skin:  Negative for rash.   Musculoskeletal:  Negative for myalgias.   Gastrointestinal:  Negative for hematemesis, hematochezia and nausea.   Genitourinary:  Negative for hematuria.   Neurological:  Negative for light-headedness.   Psychiatric/Behavioral:  Negative for altered mental status.    Allergic/Immunologic: Negative for persistent infections.       Objective:          /79 (Patient Position: Sitting)   Pulse 75   Ht 6' 1" (1.854 m)   Wt 93.2 kg (205 lb 7.5 oz)   BMI 27.11 kg/m²     Physical Exam  Vitals and nursing note reviewed.   Constitutional:       Appearance: Normal appearance. He is well-developed.   HENT:      Head: Normocephalic.      Nose: Nose normal.   Eyes:      Pupils: Pupils are equal, round, and reactive to light.   Cardiovascular:      Rate and Rhythm: Normal rate. Rhythm irregularly irregular.   Pulmonary:      Effort: No respiratory distress.   Musculoskeletal:         General: Normal range of motion.   Skin:     General: Skin is warm and dry.      Findings: No erythema.   Neurological:      Mental Status: He is alert and oriented to person, place, and time.   Psychiatric:         Speech: Speech normal.         Behavior: Behavior normal.           Lab Results   Component Value Date     (L) 12/30/2024    K 4.0 12/30/2024    MG 2.2 12/08/2024    BUN 30 (H) 12/30/2024    CREATININE 2.3 (H) " 12/30/2024    ALT <5 (L) 12/08/2024    AST 9 (L) 12/08/2024    HGB 10.1 (L) 12/30/2024    HCT 35.9 (L) 12/30/2024    HCT 30 (L) 06/15/2024    TSH 1.738 12/07/2024    LDLCALC 105.4 12/07/2024       Recent Labs   Lab 04/01/24  1800 06/04/24  0901 12/07/24  0629   INR 1.1 1.2 1.3 H       Assessment:     1. Persistent atrial fibrillation    2. Primary hypertension    3. Anticoagulant long-term use    4. Long term (current) use of anticoagulants    5. Atypical atrial flutter      Plan:     In summary, Mr. Zamorano is a 69 y.o. male with DM, kidney tx (2016), HFpEF, AF/AFL (PVI/PWI/CTI 6/2024), CVA (12/7/2024) here for follow up.  Pt back in AFL identified in cardiology clinic 12/17/24. Possibly symptomatic reporting DOSS. Had to stop eliquis due to cost but is compliant with xarelto (renal dose). Recent hospitalization for CVA with minimal-to-no residual deficits.  Discussed FELICIANO/DCCV with possible redo ablation as long term strategy. Pt would like to proceed.    Confirm FELICIANO/DCCV (UPDATE: Will proceed with FELICIANO/DCCV with longer term plan for ablation. Do not sedate for 6 weeks following CVA).  Continue other meds  RTC as scheduled    *A copy of this note has been sent to Dr. Bowden*    Follow up as scheduled.      ------------------------------------------------------------------    REYES Flynn, NP-C  Cardiac Electrophysiology

## 2025-01-07 ENCOUNTER — OFFICE VISIT (OUTPATIENT)
Dept: ELECTROPHYSIOLOGY | Facility: CLINIC | Age: 70
End: 2025-01-07
Payer: MEDICARE

## 2025-01-07 ENCOUNTER — HOSPITAL ENCOUNTER (OUTPATIENT)
Dept: CARDIOLOGY | Facility: CLINIC | Age: 70
Discharge: HOME | End: 2025-01-07
Payer: MEDICARE

## 2025-01-07 VITALS
HEIGHT: 73 IN | HEART RATE: 75 BPM | DIASTOLIC BLOOD PRESSURE: 79 MMHG | BODY MASS INDEX: 27.23 KG/M2 | WEIGHT: 205.5 LBS | SYSTOLIC BLOOD PRESSURE: 139 MMHG

## 2025-01-07 DIAGNOSIS — Z79.01 LONG TERM (CURRENT) USE OF ANTICOAGULANTS: ICD-10-CM

## 2025-01-07 DIAGNOSIS — I48.4 ATYPICAL ATRIAL FLUTTER: ICD-10-CM

## 2025-01-07 DIAGNOSIS — Z79.01 ANTICOAGULANT LONG-TERM USE: ICD-10-CM

## 2025-01-07 DIAGNOSIS — I48.19 PERSISTENT ATRIAL FIBRILLATION: Primary | ICD-10-CM

## 2025-01-07 DIAGNOSIS — I10 PRIMARY HYPERTENSION: ICD-10-CM

## 2025-01-07 DIAGNOSIS — I48.19 PERSISTENT ATRIAL FIBRILLATION: ICD-10-CM

## 2025-01-07 LAB
OHS QRS DURATION: 112 MS
OHS QTC CALCULATION: 431 MS

## 2025-01-07 PROCEDURE — 99214 OFFICE O/P EST MOD 30 MIN: CPT | Mod: HCNC,S$GLB,, | Performed by: NURSE PRACTITIONER

## 2025-01-07 PROCEDURE — 99999 PR PBB SHADOW E&M-EST. PATIENT-LVL V: CPT | Mod: PBBFAC,HCNC,, | Performed by: NURSE PRACTITIONER

## 2025-01-07 PROCEDURE — 1159F MED LIST DOCD IN RCRD: CPT | Mod: HCNC,CPTII,S$GLB, | Performed by: NURSE PRACTITIONER

## 2025-01-07 PROCEDURE — 93005 ELECTROCARDIOGRAM TRACING: CPT | Mod: HCNC,S$GLB,, | Performed by: INTERNAL MEDICINE

## 2025-01-07 PROCEDURE — 3078F DIAST BP <80 MM HG: CPT | Mod: HCNC,CPTII,S$GLB, | Performed by: NURSE PRACTITIONER

## 2025-01-07 PROCEDURE — 3288F FALL RISK ASSESSMENT DOCD: CPT | Mod: HCNC,CPTII,S$GLB, | Performed by: NURSE PRACTITIONER

## 2025-01-07 PROCEDURE — 93010 ELECTROCARDIOGRAM REPORT: CPT | Mod: HCNC,S$GLB,, | Performed by: INTERNAL MEDICINE

## 2025-01-07 PROCEDURE — 1126F AMNT PAIN NOTED NONE PRSNT: CPT | Mod: HCNC,CPTII,S$GLB, | Performed by: NURSE PRACTITIONER

## 2025-01-07 PROCEDURE — 1160F RVW MEDS BY RX/DR IN RCRD: CPT | Mod: HCNC,CPTII,S$GLB, | Performed by: NURSE PRACTITIONER

## 2025-01-07 PROCEDURE — 1101F PT FALLS ASSESS-DOCD LE1/YR: CPT | Mod: HCNC,CPTII,S$GLB, | Performed by: NURSE PRACTITIONER

## 2025-01-07 PROCEDURE — 3008F BODY MASS INDEX DOCD: CPT | Mod: HCNC,CPTII,S$GLB, | Performed by: NURSE PRACTITIONER

## 2025-01-07 PROCEDURE — 3075F SYST BP GE 130 - 139MM HG: CPT | Mod: HCNC,CPTII,S$GLB, | Performed by: NURSE PRACTITIONER

## 2025-01-07 NOTE — Clinical Note
Can we schedule patient for FELICIANO/DCCV in 2 weeks? Dr. Bowden wants to wait 6 weeks s/p his CVA which was 12/7/24. Thanks!

## 2025-01-07 NOTE — Clinical Note
Pt with hx of AF/AFL (PVI/PWI/CTI 6/2024). Back in AFL since at least [facility] clinic 12/17/2024. Reports DOSS and hx of CHF worse when in AF. He is open to redo ablation but had recent CVA 12/7/24 (is on xarelto, could not afford eliquis and he declined coumadin). Would you be ok with FELICIANO/DCCV short term with redo ablation if he has recurrence after that? thx

## 2025-01-08 ENCOUNTER — TELEPHONE (OUTPATIENT)
Dept: INTERNAL MEDICINE | Facility: CLINIC | Age: 70
End: 2025-01-08
Payer: MEDICARE

## 2025-01-08 NOTE — TELEPHONE ENCOUNTER
----- Message from REYES Monroy FNP sent at 1/5/2025  8:02 AM CST -----  Regarding: RE:  Oh no, Dr. Mack! Ill have Jacinda reach out to him! Yes, thankfully milder residual!  Karan  ----- Message -----  From: Mima Mack MD  Sent: 1/3/2025   5:53 PM CST  To: REYES Rai FNP Hi Irielle,  Can Mr. Zamorano possibly see you sooner than currently scheduled? He reports he was having some low BG and titrated his lantus from 20u to 15 u though his last A1c was 8%. He recently had a small stroke (thankfully only very mild left hand weakness residually.)  Thanks!

## 2025-01-09 ENCOUNTER — TELEPHONE (OUTPATIENT)
Dept: INTERNAL MEDICINE | Facility: CLINIC | Age: 70
End: 2025-01-09
Payer: MEDICARE

## 2025-01-09 ENCOUNTER — TELEPHONE (OUTPATIENT)
Dept: ELECTROPHYSIOLOGY | Facility: CLINIC | Age: 70
End: 2025-01-09
Payer: MEDICARE

## 2025-01-09 DIAGNOSIS — I48.19 PERSISTENT ATRIAL FIBRILLATION: Primary | ICD-10-CM

## 2025-01-09 NOTE — TELEPHONE ENCOUNTER
Spoke to patient to schedule FELICIANO/ Cardioversion with Dr. Bowden. Patient agreed to schedule on 2/3/2025 with an 8:00 AM arrival time. Advised patient instructions will be sent via mail with further instructions about procedure and pre-op lab work. Patient verbalized understanding and appreciated the call.

## 2025-01-09 NOTE — TELEPHONE ENCOUNTER
----- Message from Galina sent at 1/9/2025  8:16 AM CST -----  Contact: [phone]  [email or web address]     Patient is calling for Medical Advice regarding:Patient would like to r/s his appointment that is tomorrow 1/10/25 due to the weather.       Comments:Please call patient to r/s.

## 2025-01-11 RX ORDER — PEN NEEDLE, DIABETIC 30 GX3/16"
NEEDLE, DISPOSABLE MISCELLANEOUS
Qty: 400 EACH | Refills: 3 | Status: CANCELLED | OUTPATIENT
Start: 2025-01-11

## 2025-01-13 RX ORDER — PEN NEEDLE, DIABETIC 30 GX3/16"
NEEDLE, DISPOSABLE MISCELLANEOUS
Qty: 400 EACH | Refills: 3 | Status: SHIPPED | OUTPATIENT
Start: 2025-01-13

## 2025-01-14 DIAGNOSIS — Z00.00 ENCOUNTER FOR MEDICARE ANNUAL WELLNESS EXAM: ICD-10-CM

## 2025-01-15 DIAGNOSIS — D63.8 ANEMIA OF CHRONIC DISEASE: Primary | ICD-10-CM

## 2025-01-27 ENCOUNTER — LAB VISIT (OUTPATIENT)
Dept: LAB | Facility: HOSPITAL | Age: 70
End: 2025-01-27
Attending: INTERNAL MEDICINE
Payer: MEDICARE

## 2025-01-27 DIAGNOSIS — N18.31 TYPE 2 DIABETES MELLITUS WITH STAGE 3A CHRONIC KIDNEY DISEASE, WITH LONG-TERM CURRENT USE OF INSULIN: ICD-10-CM

## 2025-01-27 DIAGNOSIS — E11.22 TYPE 2 DIABETES MELLITUS WITH STAGE 3A CHRONIC KIDNEY DISEASE, WITH LONG-TERM CURRENT USE OF INSULIN: ICD-10-CM

## 2025-01-27 DIAGNOSIS — D63.8 ANEMIA OF CHRONIC DISEASE: ICD-10-CM

## 2025-01-27 DIAGNOSIS — Z79.4 TYPE 2 DIABETES MELLITUS WITH STAGE 3A CHRONIC KIDNEY DISEASE, WITH LONG-TERM CURRENT USE OF INSULIN: ICD-10-CM

## 2025-01-27 DIAGNOSIS — I48.19 PERSISTENT ATRIAL FIBRILLATION: ICD-10-CM

## 2025-01-27 LAB
ANION GAP SERPL CALC-SCNC: 11 MMOL/L (ref 8–16)
APTT PPP: 36 SEC (ref 21–32)
BASOPHILS # BLD AUTO: 0.04 K/UL (ref 0–0.2)
BASOPHILS NFR BLD: 0.7 % (ref 0–1.9)
BUN SERPL-MCNC: 37 MG/DL (ref 8–23)
CALCIUM SERPL-MCNC: 8.6 MG/DL (ref 8.7–10.5)
CHLORIDE SERPL-SCNC: 103 MMOL/L (ref 95–110)
CO2 SERPL-SCNC: 23 MMOL/L (ref 23–29)
CREAT SERPL-MCNC: 2.2 MG/DL (ref 0.5–1.4)
DIFFERENTIAL METHOD BLD: ABNORMAL
EOSINOPHIL # BLD AUTO: 0.1 K/UL (ref 0–0.5)
EOSINOPHIL NFR BLD: 1.3 % (ref 0–8)
ERYTHROCYTE [DISTWIDTH] IN BLOOD BY AUTOMATED COUNT: 17.1 % (ref 11.5–14.5)
EST. GFR  (NO RACE VARIABLE): 32 ML/MIN/1.73 M^2
ESTIMATED AVG GLUCOSE: 200 MG/DL (ref 68–131)
GLUCOSE SERPL-MCNC: 159 MG/DL (ref 70–110)
HBA1C MFR BLD: 8.6 % (ref 4–5.6)
HCT VFR BLD AUTO: 34.2 % (ref 40–54)
HGB BLD-MCNC: 9.6 G/DL (ref 14–18)
IMM GRANULOCYTES # BLD AUTO: 0.03 K/UL (ref 0–0.04)
IMM GRANULOCYTES NFR BLD AUTO: 0.6 % (ref 0–0.5)
INR PPP: 1.3 (ref 0.8–1.2)
IRON SERPL-MCNC: 92 UG/DL (ref 45–160)
LYMPHOCYTES # BLD AUTO: 0.7 K/UL (ref 1–4.8)
LYMPHOCYTES NFR BLD: 12.3 % (ref 18–48)
MCH RBC QN AUTO: 22.6 PG (ref 27–31)
MCHC RBC AUTO-ENTMCNC: 28.1 G/DL (ref 32–36)
MCV RBC AUTO: 81 FL (ref 82–98)
MONOCYTES # BLD AUTO: 1 K/UL (ref 0.3–1)
MONOCYTES NFR BLD: 18.8 % (ref 4–15)
NEUTROPHILS # BLD AUTO: 3.6 K/UL (ref 1.8–7.7)
NEUTROPHILS NFR BLD: 66.3 % (ref 38–73)
NRBC BLD-RTO: 0 /100 WBC
PLATELET # BLD AUTO: 112 K/UL (ref 150–450)
PMV BLD AUTO: ABNORMAL FL (ref 9.2–12.9)
POTASSIUM SERPL-SCNC: 4.3 MMOL/L (ref 3.5–5.1)
PROTHROMBIN TIME: 13.7 SEC (ref 9–12.5)
RBC # BLD AUTO: 4.25 M/UL (ref 4.6–6.2)
SATURATED IRON: 45 % (ref 20–50)
SODIUM SERPL-SCNC: 137 MMOL/L (ref 136–145)
TOTAL IRON BINDING CAPACITY: 203 UG/DL (ref 250–450)
TRANSFERRIN SERPL-MCNC: 137 MG/DL (ref 200–375)
WBC # BLD AUTO: 5.44 K/UL (ref 3.9–12.7)

## 2025-01-27 PROCEDURE — 85730 THROMBOPLASTIN TIME PARTIAL: CPT | Mod: HCNC | Performed by: INTERNAL MEDICINE

## 2025-01-27 PROCEDURE — 80048 BASIC METABOLIC PNL TOTAL CA: CPT | Mod: HCNC | Performed by: INTERNAL MEDICINE

## 2025-01-27 PROCEDURE — 84466 ASSAY OF TRANSFERRIN: CPT | Mod: HCNC | Performed by: STUDENT IN AN ORGANIZED HEALTH CARE EDUCATION/TRAINING PROGRAM

## 2025-01-27 PROCEDURE — 85025 COMPLETE CBC W/AUTO DIFF WBC: CPT | Mod: HCNC | Performed by: INTERNAL MEDICINE

## 2025-01-27 PROCEDURE — 83036 HEMOGLOBIN GLYCOSYLATED A1C: CPT | Mod: HCNC | Performed by: NURSE PRACTITIONER

## 2025-01-27 PROCEDURE — 36415 COLL VENOUS BLD VENIPUNCTURE: CPT | Mod: HCNC,PN | Performed by: INTERNAL MEDICINE

## 2025-01-27 PROCEDURE — 85610 PROTHROMBIN TIME: CPT | Mod: HCNC | Performed by: INTERNAL MEDICINE

## 2025-01-27 PROCEDURE — 83540 ASSAY OF IRON: CPT | Mod: HCNC | Performed by: STUDENT IN AN ORGANIZED HEALTH CARE EDUCATION/TRAINING PROGRAM

## 2025-01-29 ENCOUNTER — TELEPHONE (OUTPATIENT)
Dept: ELECTROPHYSIOLOGY | Facility: CLINIC | Age: 70
End: 2025-01-29
Payer: MEDICARE

## 2025-01-29 NOTE — TELEPHONE ENCOUNTER
Called patient due to reviewing pre op lab work for upcoming procedure. Hgb 9.6 Hct 34.2. Patient reports no signs of active bleeding. Will notify Dr. Bowden if okay to proceed with procedure.

## 2025-01-30 ENCOUNTER — TELEPHONE (OUTPATIENT)
Dept: INTERNAL MEDICINE | Facility: CLINIC | Age: 70
End: 2025-01-30
Payer: MEDICARE

## 2025-01-30 ENCOUNTER — TELEPHONE (OUTPATIENT)
Dept: ELECTROPHYSIOLOGY | Facility: CLINIC | Age: 70
End: 2025-01-30
Payer: MEDICARE

## 2025-01-30 PROBLEM — Z86.73 HISTORY OF CVA (CEREBROVASCULAR ACCIDENT): Status: ACTIVE | Noted: 2025-01-30

## 2025-01-30 NOTE — TELEPHONE ENCOUNTER
Spoke to patient    CONFIRMED procedure arrival time of 8:00 AM on 2/3/2025    Reiterated instructions including:  -Directions to check in desk  -NPO after midnight night prior to procedure  -High importance of HOLDING Jardiance, Insulin, and Lantus the morning of the procedure. Patient verbalized understanding.   -Confirmed compliance of Xarelto. Instructed patient to continue to take his Xarelto as prescribed and do not miss any doses. Patient verbalized understanding.   -Pre-procedure LABS Reviewed. Hgb 9.6 Hct 34.2. Dr. Cho notified. Okay to proceed with procedure. Patient reports no signs of active bleeding.   -Confirmed absence  of implanted device/stimulator   -Confirmed no fever, cough. Patient reports shortness of breath in the past 30 days but due to the afib per patient.   -Confirmed no redness, rash, irritation, or yeast infection to chest area.   -Reviewed current visitor policy    Patient verbalized understanding of above and appreciated the call.

## 2025-01-30 NOTE — TELEPHONE ENCOUNTER
Pt was notified of message from provider re:A1c  8.6.  pt has limited mobility now d/t A-fib.  He is set for Cardioversion within the next 2 weeks but has agreed to walk short distance inside of house, keep water intake to 50 oz/ 1.5L a day watch carb intake and try staying calm.    ----- Message from REYES Monroy FNP sent at 1/28/2025  8:08 AM CST -----  Regarding: a1c  A1c is too high  Let pt know  A1c 8.6%

## 2025-01-31 ENCOUNTER — OFFICE VISIT (OUTPATIENT)
Dept: INTERNAL MEDICINE | Facility: CLINIC | Age: 70
End: 2025-01-31
Payer: MEDICARE

## 2025-01-31 VITALS
WEIGHT: 202.19 LBS | BODY MASS INDEX: 26.8 KG/M2 | OXYGEN SATURATION: 97 % | SYSTOLIC BLOOD PRESSURE: 126 MMHG | DIASTOLIC BLOOD PRESSURE: 70 MMHG | HEART RATE: 76 BPM | HEIGHT: 73 IN

## 2025-01-31 DIAGNOSIS — I73.9 CLAUDICATION OF BOTH LOWER EXTREMITIES: ICD-10-CM

## 2025-01-31 DIAGNOSIS — E11.42 DIABETIC POLYNEUROPATHY ASSOCIATED WITH TYPE 2 DIABETES MELLITUS: ICD-10-CM

## 2025-01-31 DIAGNOSIS — Z94.0 S/P KIDNEY TRANSPLANT: ICD-10-CM

## 2025-01-31 DIAGNOSIS — Z79.4 TYPE 2 DIABETES MELLITUS WITH STAGE 3A CHRONIC KIDNEY DISEASE, WITH LONG-TERM CURRENT USE OF INSULIN: Primary | ICD-10-CM

## 2025-01-31 DIAGNOSIS — F32.A DEPRESSION, UNSPECIFIED DEPRESSION TYPE: ICD-10-CM

## 2025-01-31 DIAGNOSIS — N18.31 TYPE 2 DIABETES MELLITUS WITH STAGE 3A CHRONIC KIDNEY DISEASE, WITH LONG-TERM CURRENT USE OF INSULIN: Primary | ICD-10-CM

## 2025-01-31 DIAGNOSIS — I73.9 PAD (PERIPHERAL ARTERY DISEASE): ICD-10-CM

## 2025-01-31 DIAGNOSIS — E78.2 MIXED HYPERLIPIDEMIA: ICD-10-CM

## 2025-01-31 DIAGNOSIS — I48.19 PERSISTENT ATRIAL FIBRILLATION: ICD-10-CM

## 2025-01-31 DIAGNOSIS — E11.22 TYPE 2 DIABETES MELLITUS WITH STAGE 3A CHRONIC KIDNEY DISEASE, WITH LONG-TERM CURRENT USE OF INSULIN: Primary | ICD-10-CM

## 2025-01-31 DIAGNOSIS — N18.32 STAGE 3B CHRONIC KIDNEY DISEASE: ICD-10-CM

## 2025-01-31 DIAGNOSIS — Z86.73 HISTORY OF CVA (CEREBROVASCULAR ACCIDENT): ICD-10-CM

## 2025-01-31 DIAGNOSIS — T38.0X5D ADVERSE EFFECT OF GLUCOCORTICOID OR SYNTHETIC ANALOGUE, SUBSEQUENT ENCOUNTER: ICD-10-CM

## 2025-01-31 DIAGNOSIS — I10 PRIMARY HYPERTENSION: ICD-10-CM

## 2025-01-31 DIAGNOSIS — D64.9 SYMPTOMATIC ANEMIA: ICD-10-CM

## 2025-01-31 PROCEDURE — 3288F FALL RISK ASSESSMENT DOCD: CPT | Mod: HCNC,CPTII,S$GLB, | Performed by: NURSE PRACTITIONER

## 2025-01-31 PROCEDURE — 1101F PT FALLS ASSESS-DOCD LE1/YR: CPT | Mod: HCNC,CPTII,S$GLB, | Performed by: NURSE PRACTITIONER

## 2025-01-31 PROCEDURE — 3078F DIAST BP <80 MM HG: CPT | Mod: HCNC,CPTII,S$GLB, | Performed by: NURSE PRACTITIONER

## 2025-01-31 PROCEDURE — 99214 OFFICE O/P EST MOD 30 MIN: CPT | Mod: HCNC,S$GLB,, | Performed by: NURSE PRACTITIONER

## 2025-01-31 PROCEDURE — 3008F BODY MASS INDEX DOCD: CPT | Mod: HCNC,CPTII,S$GLB, | Performed by: NURSE PRACTITIONER

## 2025-01-31 PROCEDURE — 3074F SYST BP LT 130 MM HG: CPT | Mod: HCNC,CPTII,S$GLB, | Performed by: NURSE PRACTITIONER

## 2025-01-31 PROCEDURE — 1126F AMNT PAIN NOTED NONE PRSNT: CPT | Mod: HCNC,CPTII,S$GLB, | Performed by: NURSE PRACTITIONER

## 2025-01-31 PROCEDURE — 1160F RVW MEDS BY RX/DR IN RCRD: CPT | Mod: HCNC,CPTII,S$GLB, | Performed by: NURSE PRACTITIONER

## 2025-01-31 PROCEDURE — 99999 PR PBB SHADOW E&M-EST. PATIENT-LVL V: CPT | Mod: PBBFAC,HCNC,, | Performed by: NURSE PRACTITIONER

## 2025-01-31 PROCEDURE — 3052F HG A1C>EQUAL 8.0%<EQUAL 9.0%: CPT | Mod: HCNC,CPTII,S$GLB, | Performed by: NURSE PRACTITIONER

## 2025-01-31 PROCEDURE — 1159F MED LIST DOCD IN RCRD: CPT | Mod: HCNC,CPTII,S$GLB, | Performed by: NURSE PRACTITIONER

## 2025-01-31 RX ORDER — INSULIN PMP CART,AUT,G6/7,CNTR
EACH SUBCUTANEOUS
Start: 2025-01-31

## 2025-01-31 RX ORDER — INSULIN ASPART 100 [IU]/ML
INJECTION, SOLUTION INTRAVENOUS; SUBCUTANEOUS
Start: 2025-01-31

## 2025-01-31 NOTE — PROGRESS NOTES
CHIEF COMPLAINT: Type 2 Diabetes     HPI: Mr. Ravi Zamorano is a 69 y.o. male who was diagnosed with Type 2 DM in 2000.  S/p kidney transplant 2016  Accompanied by wife.    F/u with cards, had ablation in 2024.   F/u with hem/onc, thrombocytopenia   Had bone marrow in summer 2024.   Having false lows.  6/2024, PHUONG, edema.  Still dealing with ankle edema.    Has f/u with nephrology this month and cardiology in 2025-jan.   H/o severe hypoglycemia < 38 mg/dl  Wt loss 7#   Using maycol 2.  On maintenance w/ pred.    Eggs (2), toasts or muffin wheat , turkey sausage   Skips lunch at times  Potato salad, sweet peas, meat, rice    A1c elevated.  12/7/2024 embolic stroke , (L) weakness    Lab Results   Component Value Date    HGBA1C 8.6 (H) 01/27/2025       Past Medical History:   Diagnosis Date    Anticoagulant long-term use     Anxiety 07/29/2014    Arthritis     atrial fibrillation     Bilateral diabetic retinopathy 2017    Cardioembolic stroke 12/7/2024    CKD (chronic kidney disease) stage 2, GFR 60-89 ml/min 12/28/2016    CKD (chronic kidney disease) stage 4, GFR 15-29 ml/min 07/29/2014    Colon polyps 2014    Depression - situational 07/29/2014    Diabetes mellitus     Diabetes type 2 since 2000 07/29/2014    Diabetic neuropathy 07/29/2014    Encounter for blood transfusion     History of cardioversion 10/03/2019    History of hepatitis C, s/p successful Rx w/ SVR24 - 9/2017 07/29/2014    Genotype 1a, treatment naive 10/2014 liver biopsy - grade 1 / stage 1 Completed Harvoni w/ SVR    Hyperlipidemia 07/29/2014    Hypertension     Neuropathy     Organ transplant candidate 07/29/2014    Pancreatitis     S/P cadaveric kidney transplant 11/5/2016. ESRD secondary to HTN/DMII 11/05/2016    Stage 3a chronic kidney disease 12/28/2016    Type 2 diabetes mellitus with stage 3a chronic kidney disease, with long-term current use of insulin 07/29/2014     Remains on MDI---lantus and humalog     PREVIOUS DIABETES MEDICATIONS  TRIED  lantus  humalog  novolog  levemir  trulicity -wt loss   tradjenta  Metformin   ozempic   Jardiance     CURRENT DIABETIC MEDS: lantus 18 units in am, novolog 12-15 units ac w/ scale 180-230+2, etc, jardiance 10 mg daily     On MDI (injections 4 x a day)   Makes frequent changes to his/her insulin regimen on the basis of blood glucose data  Testing 4 x a day max   Patient is willing and able to use the device  Demonstrated an understanding of the technology and is motivated to use CGM  Patient expected to adhere to a comprehensive diabetes treatment plan and patient has adequate medical supervision    Has multiple impaired awareness of hypoglycemia (hypoglycemia unawareness)  Needs work with dietary habits    Diabetes Management Status    Statin: Taking  ACE/ARB: Taking    Screening or Prevention Patient's value Goal Complete/Controlled?   HgA1C Testing and Control   Lab Results   Component Value Date    HGBA1C 8.6 (H) 01/27/2025      Annually/Less than 8% Yes   Lipid profile : 12/07/2024 Annually Yes   LDL control Lab Results   Component Value Date    LDLCALC 105.4 12/07/2024    Annually/Less than 100 mg/dl  Yes   Nephropathy screening Lab Results   Component Value Date    LABMICR >5000.0 12/06/2024     Lab Results   Component Value Date    PROTEINUA 3+ (A) 12/06/2024    Annually Yes   Blood pressure BP Readings from Last 1 Encounters:   01/07/25 139/79    Less than 140/90 No   Dilated retinal exam : 08/08/2024 Annually Yes   Foot exam   : 07/03/2024 Annually No     REVIEW OF SYSTEMS  General: no weakness, fatigue, + weight changes 7# loss   Eyes: no double or blurred vision, eye pain, or redness  Cardiovascular: no chest pain, palpitations, + ankle edema, or murmurs.   Respiratory: no cough or dyspnea.   GI: no heartburn, nausea, or changes in bowel patterns; good appetite.   Skin: no rashes, dryness, itching, or reactions at insulin injection sites.  Neuro: + numbness, tingling-gabapentin,RLS, tremors, or  "vertigo. +loss of strength in legs  Endocrine: no polyuria, polydipsia, polyphagia, heat or cold intolerance.     Vital Signs  /70 (BP Location: Left arm, Patient Position: Sitting)   Pulse 76   Ht 6' 1" (1.854 m)   Wt 91.7 kg (202 lb 2.6 oz)   SpO2 97%   BMI 26.67 kg/m²     Hemoglobin A1C   Date Value Ref Range Status   01/27/2025 8.6 (H) 4.0 - 5.6 % Final     Comment:     ADA Screening Guidelines:  5.7-6.4%  Consistent with prediabetes  >or=6.5%  Consistent with diabetes    High levels of fetal hemoglobin interfere with the HbA1C  assay. Heterozygous hemoglobin variants (HbS, HgC, etc)do  not significantly interfere with this assay.   However, presence of multiple variants may affect accuracy.     12/07/2024 8.1 (H) 4.0 - 5.6 % Final     Comment:     ADA Screening Guidelines:  5.7-6.4%  Consistent with prediabetes  >or=6.5%  Consistent with diabetes    High levels of fetal hemoglobin interfere with the HbA1C  assay. Heterozygous hemoglobin variants (HbS, HgC, etc)do  not significantly interfere with this assay.   However, presence of multiple variants may affect accuracy.     11/19/2024 7.4 (H) 4.0 - 5.6 % Final     Comment:     ADA Screening Guidelines:  5.7-6.4%  Consistent with prediabetes  >or=6.5%  Consistent with diabetes    High levels of fetal hemoglobin interfere with the HbA1C  assay. Heterozygous hemoglobin variants (HbS, HgC, etc)do  not significantly interfere with this assay.   However, presence of multiple variants may affect accuracy.          Chemistry        Component Value Date/Time     01/27/2025 1005    K 4.3 01/27/2025 1005     01/27/2025 1005    CO2 23 01/27/2025 1005    BUN 37 (H) 01/27/2025 1005    CREATININE 2.2 (H) 01/27/2025 1005     (H) 01/27/2025 1005        Component Value Date/Time    CALCIUM 8.6 (L) 01/27/2025 1005    ALKPHOS 51 12/08/2024 0551    AST 9 (L) 12/08/2024 0551    ALT <5 (L) 12/08/2024 0551    BILITOT 0.3 12/08/2024 0551    ESTGFRAFRICA " 51.9 (A) 07/19/2022 0723    EGFRNONAA 44.9 (A) 07/19/2022 0723           Lab Results   Component Value Date    TSH 1.738 12/07/2024      Lab Results   Component Value Date    CHOL 172 12/07/2024    CHOL 112 (L) 07/01/2024    CHOL 131 06/07/2023     Lab Results   Component Value Date    HDL 30 (L) 12/07/2024    HDL 26 (L) 07/01/2024    HDL 31 (L) 06/07/2023     Lab Results   Component Value Date    LDLCALC 105.4 12/07/2024    LDLCALC 48.6 (L) 07/01/2024    LDLCALC 61.4 (L) 06/07/2023     Lab Results   Component Value Date    TRIG 183 (H) 12/07/2024    TRIG 187 (H) 07/01/2024    TRIG 193 (H) 06/07/2023     Lab Results   Component Value Date    CHOLHDL 17.4 (L) 12/07/2024    CHOLHDL 23.2 07/01/2024    CHOLHDL 23.7 06/07/2023       PHYSICAL EXAMINATION  Constitutional: Appears well, no distress. Reviewed vitals above.  Eyes: conjunctivae & lids intact; PERRLA, EOMs intact; optic discs   Neck: Supple, trachea midline.   Respiratory: no wheezes, even and unlabored  Cardiovascular: RRR; s1s2   Lymph: deferred   Skin: warm and dry; no injection site reactions, no acanthosis nigracans observed.  Neuro:patient alert and cooperative, normal affect; steady gait.  Psychiatric: judgement & insight intact, orientation of time, place & person intact, memory; mood & affect wnl     Diabetes Foot Exam:   Deferred     Assessment/Plan  1. Type 2 diabetes mellitus with stage 3a chronic kidney disease, with long-term current use of insulin  Hemoglobin A1C    Ambulatory referral/consult to Diabetes Education    F/u in 3 months  Labs in 3 months   Discussed omnipod 5   Pump eval first, novolog vials   Mn -mn 0.65 u/hr  Target 120-130 mg/dl   Max bolus 20 units   Max basal 2 u/hr   ICR 1 to 5          2. Diabetic polyneuropathy associated with type 2 diabetes mellitus  F/u with podiatry   Stable       3. Primary hypertension  Microalbumin/Creatinine Ratio, Urine  Bp controlled  On arb/acei       4. Mixed hyperlipidemia  Lab Results    Component Value Date    LDLCALC 105.4 12/07/2024     Above goal  On statin   F/u with cards, [id number] , zetia will be discussed       5. Depression, unspecified depression type  Stable   No med(s)      6. S/P cadaveric kidney transplant 11/5/2016. ESRD secondary to HTN/DMII  F/u with KTS   stable      7. Stage 3b chronic kidney disease  Avoid hypoglycemia  F/u with KTS  On arb, cardura       8. Adverse effect of glucocorticoid or synthetic analogue, subsequent encounter  On pred  Stable   May affect insulin resistance       9. Persistent atrial fibrillation  On xarelto  Stable       10. PAD (peripheral artery disease)  F/u with vascular  Stable       11. Claudication of both lower extremities  F/u with vascular   Stable       12. Symptomatic anemia  May affect A1c, lowering affect       13. History of CVA (cerebrovascular accident)  Discussed bp, A1c, ldl-better levels

## 2025-01-31 NOTE — PATIENT INSTRUCTIONS
Follow up in 3 months w/Karan   DE education omnipod eval  A1c urine mac in 3 months     Lab Results   Component Value Date    HGBA1C 8.6 (H) 01/27/2025     Goal less than 7.5%    [email or web address]  Eat fit marion  Myfitnesspal marion  [email or web address]    Mysugr marion     Lantus 18 units at night   Novolog 10-15 units before meals   Scale 180-230+2, etc.     Omnipod 5

## 2025-02-03 ENCOUNTER — ANESTHESIA (OUTPATIENT)
Dept: MEDSURG UNIT | Facility: HOSPITAL | Age: 70
End: 2025-02-03
Payer: MEDICARE

## 2025-02-03 ENCOUNTER — HOSPITAL ENCOUNTER (OUTPATIENT)
Facility: HOSPITAL | Age: 70
Discharge: HOME | End: 2025-02-03
Attending: INTERNAL MEDICINE | Admitting: INTERNAL MEDICINE
Payer: MEDICARE

## 2025-02-03 ENCOUNTER — ANESTHESIA EVENT (OUTPATIENT)
Dept: MEDSURG UNIT | Facility: HOSPITAL | Age: 70
End: 2025-02-03
Payer: MEDICARE

## 2025-02-03 ENCOUNTER — HOSPITAL ENCOUNTER (OUTPATIENT)
Dept: CARDIOLOGY | Facility: HOSPITAL | Age: 70
Discharge: HOME | End: 2025-02-03
Attending: INTERNAL MEDICINE | Admitting: INTERNAL MEDICINE
Payer: MEDICARE

## 2025-02-03 VITALS
HEART RATE: 68 BPM | RESPIRATION RATE: 15 BRPM | OXYGEN SATURATION: 99 % | DIASTOLIC BLOOD PRESSURE: 77 MMHG | SYSTOLIC BLOOD PRESSURE: 168 MMHG | BODY MASS INDEX: 26.51 KG/M2 | TEMPERATURE: 97 F | HEIGHT: 73 IN | WEIGHT: 200 LBS

## 2025-02-03 VITALS
HEIGHT: 73 IN | DIASTOLIC BLOOD PRESSURE: 82 MMHG | BODY MASS INDEX: 26.51 KG/M2 | WEIGHT: 200 LBS | SYSTOLIC BLOOD PRESSURE: 171 MMHG

## 2025-02-03 DIAGNOSIS — I48.91 ATRIAL FIBRILLATION: ICD-10-CM

## 2025-02-03 DIAGNOSIS — I48.19 PERSISTENT ATRIAL FIBRILLATION: ICD-10-CM

## 2025-02-03 LAB
ASCENDING AORTA: 3.9 CM
BSA FOR ECHO PROCEDURE: 2.16 M2
LAA PV: 20 CM/S
OHS QRS DURATION: 118 MS
OHS QTC CALCULATION: 440 MS
POCT GLUCOSE: 262 MG/DL (ref 70–110)
POCT GLUCOSE: 295 MG/DL (ref 70–110)
SINUS: 3.6 CM
STJ: 3.3 CM

## 2025-02-03 PROCEDURE — 93320 DOPPLER ECHO COMPLETE: CPT | Mod: HCNC

## 2025-02-03 PROCEDURE — 93320 DOPPLER ECHO COMPLETE: CPT | Mod: 26,HCNC,, | Performed by: INTERNAL MEDICINE

## 2025-02-03 PROCEDURE — 93010 ELECTROCARDIOGRAM REPORT: CPT | Mod: HCNC,,, | Performed by: STUDENT IN AN ORGANIZED HEALTH CARE EDUCATION/TRAINING PROGRAM

## 2025-02-03 PROCEDURE — 25000003 PHARM REV CODE 250: Mod: HCNC | Performed by: NURSE ANESTHETIST, CERTIFIED REGISTERED

## 2025-02-03 PROCEDURE — 93005 ELECTROCARDIOGRAM TRACING: CPT | Mod: HCNC

## 2025-02-03 PROCEDURE — 92960 CARDIOVERSION ELECTRIC EXT: CPT | Mod: HCNC,,, | Performed by: INTERNAL MEDICINE

## 2025-02-03 PROCEDURE — 93325 DOPPLER ECHO COLOR FLOW MAPG: CPT | Mod: 26,HCNC,, | Performed by: INTERNAL MEDICINE

## 2025-02-03 PROCEDURE — 63600175 PHARM REV CODE 636 W HCPCS: Mod: HCNC | Performed by: NURSE ANESTHETIST, CERTIFIED REGISTERED

## 2025-02-03 PROCEDURE — D9220A PRA ANESTHESIA: Mod: HCNC,CRNA,, | Performed by: NURSE ANESTHETIST, CERTIFIED REGISTERED

## 2025-02-03 PROCEDURE — 93010 ELECTROCARDIOGRAM REPORT: CPT | Mod: HCNC,,, | Performed by: INTERNAL MEDICINE

## 2025-02-03 PROCEDURE — 37000009 HC ANESTHESIA EA ADD 15 MINS: Mod: HCNC | Performed by: INTERNAL MEDICINE

## 2025-02-03 PROCEDURE — 37000008 HC ANESTHESIA 1ST 15 MINUTES: Mod: HCNC | Performed by: INTERNAL MEDICINE

## 2025-02-03 PROCEDURE — D9220A PRA ANESTHESIA: Mod: HCNC,ANES,, | Performed by: ANESTHESIOLOGY

## 2025-02-03 PROCEDURE — 82962 GLUCOSE BLOOD TEST: CPT | Mod: HCNC | Performed by: INTERNAL MEDICINE

## 2025-02-03 PROCEDURE — 92960 CARDIOVERSION ELECTRIC EXT: CPT | Mod: HCNC | Performed by: INTERNAL MEDICINE

## 2025-02-03 PROCEDURE — 93312 ECHO TRANSESOPHAGEAL: CPT | Mod: 26,HCNC,, | Performed by: INTERNAL MEDICINE

## 2025-02-03 RX ORDER — ETOMIDATE 2 MG/ML
INJECTION INTRAVENOUS
Status: DISCONTINUED | OUTPATIENT
Start: 2025-02-03 | End: 2025-02-03

## 2025-02-03 RX ORDER — PROPOFOL 10 MG/ML
VIAL (ML) INTRAVENOUS
Status: DISCONTINUED | OUTPATIENT
Start: 2025-02-03 | End: 2025-02-03

## 2025-02-03 RX ORDER — HYDROMORPHONE HYDROCHLORIDE 1 MG/ML
0.2 INJECTION, SOLUTION INTRAMUSCULAR; INTRAVENOUS; SUBCUTANEOUS EVERY 5 MIN PRN
Status: CANCELLED | OUTPATIENT
Start: 2025-02-03

## 2025-02-03 RX ORDER — GLUCAGON 1 MG
1 KIT INJECTION
Status: CANCELLED | OUTPATIENT
Start: 2025-02-03

## 2025-02-03 RX ORDER — SODIUM CHLORIDE 0.9 % (FLUSH) 0.9 %
3 SYRINGE (ML) INJECTION
Status: CANCELLED | OUTPATIENT
Start: 2025-02-03

## 2025-02-03 RX ORDER — HALOPERIDOL 5 MG/ML
0.5 INJECTION INTRAMUSCULAR EVERY 10 MIN PRN
Status: CANCELLED | OUTPATIENT
Start: 2025-02-03

## 2025-02-03 RX ORDER — SILVER SULFADIAZINE 10 G/1000G
CREAM TOPICAL DAILY
Status: DISCONTINUED | OUTPATIENT
Start: 2025-02-03 | End: 2025-02-03 | Stop reason: HOSPADM

## 2025-02-03 RX ORDER — LIDOCAINE HYDROCHLORIDE 20 MG/ML
INJECTION INTRAVENOUS
Status: DISCONTINUED | OUTPATIENT
Start: 2025-02-03 | End: 2025-02-03

## 2025-02-03 RX ORDER — ONDANSETRON HYDROCHLORIDE 2 MG/ML
4 INJECTION, SOLUTION INTRAVENOUS DAILY PRN
Status: CANCELLED | OUTPATIENT
Start: 2025-02-03

## 2025-02-03 RX ORDER — OXYCODONE AND ACETAMINOPHEN 5; 325 MG/1; MG/1
1 TABLET ORAL
Status: CANCELLED | OUTPATIENT
Start: 2025-02-03

## 2025-02-03 RX ADMIN — ETOMIDATE 8 MG: 2 INJECTION, SOLUTION INTRAVENOUS at 10:02

## 2025-02-03 RX ADMIN — LIDOCAINE HYDROCHLORIDE 100 MG: 20 INJECTION INTRAVENOUS at 10:02

## 2025-02-03 RX ADMIN — PROPOFOL 100 MCG/KG/MIN: 10 INJECTION, EMULSION INTRAVENOUS at 10:02

## 2025-02-03 RX ADMIN — SODIUM CHLORIDE: 0.9 INJECTION, SOLUTION INTRAVENOUS at 10:02

## 2025-02-03 RX ADMIN — PROPOFOL 80 MG: 10 INJECTION, EMULSION INTRAVENOUS at 10:02

## 2025-02-03 NOTE — DISCHARGE SUMMARY
[facility] Short Stay Cardiac [facility]  Cardiology  Discharge Summary      Patient Name: Ravi Zamorano  MRN: [id number]  Admission Date: 2/3/2025  Hospital Length of Stay: 0 days  Discharge Date and Time: 2/3/2025 11:48 AM  Attending Physician: Bronson Bowden MD    Discharging Provider: Christi Ennis PA-C  Primary Care Physician: Mima Mack MD    HPI:   Last OV with Mima Lyons NP on 01/07/25.   Mr. Zamorano is a 69 y.o. male with DM, kidney tx (2016), HFpEF, AF/AFL (PVI/PWI/CTI 6/2024), CVA (12/7/2024) here for follow up.     Background:  Ravi Zamorano has a history of DM on insulin, CKD2, history of kidney transplant in 2016 (Cr 1.5 in 2/2021), and diastolic dysfunction, who was diagnosed with rate controlled AF in 8/2019, manifesting as worsening DOSS. An echo done in 8/2019 showed an EF of 55% and severe LAE (MINA 54.3 mL/m2). He underwent DCCV in 8/2010, and was started on flecainide 100 mg bid post-procedure.     An echo in 6/2021 showed an EF of 55%. A 2 week Bardy DX monitor done in 7/2019 showed no arrhythmias.     At his 4/2023 visit, he was maintaining sinus rhythm but reporting more LH and DOSS. QRS wide on ECG--flecainide reduced to 50 mg bid. By 5/2023 QRS had narrowed, although he had an episode of syncope in 5/2023 thought to be due to hypotension 2/2 chlorthalidone.      SPECT 5/15/2023 negative for ischemia or scar. Event monitor 1/2024--one episode of AF noted 12/24/2023, and episodes of SR and junctional rhythm with HR ranging from 54-74.     At 2/2024 pt was in sinus rhythm. Reported chronic DOSS. Episodes of LH when hypotensive. Creatinine of 1.7 in 11/2023.  ms.     Pt seen by cardiology in 4/2024--back in AF. Presented to ED in 5/2024 with rate controlled AF and worsening HF sx..      Admitted in 4/2024 for anemia (Hg 5.7). GI thought it was low likelihood this was 2/2 GIB. Awaiting outpatient hematology input. Back on eliquis. Hg stable over the past month or so.      ADHF admission on 5/2024.      5/6/2024: In summary, Ravi Zamorano is a 68 year old male with persistent AF, who underwent DCCV and flecainide initiation in 8/2019. Over the past several months he has had recurrent symptomatic AF.  Plan for PVI. Hold eliquis AM of procedure. FELICIANO--cx if sinus. Post-procedure will initiate a brief course of antiarrhythmics. Will also check for stable Hg in the weeks leading up to procedure, and postpone if Hg drifts down or if work-up identifies a source/problem that needs to be addressed.     6/11/2024: DCCV prior to ablation. PVI. LA posterior wall isolation. CTI-line.     9/19/2024: Pt is 3 mo s/p PVI/PWI/CTI-line. He is doing well from a rhythm standpoint, with no documented or symptomatic recurrence of arrhythmia since procedure.  He recently stopped his multaq. Not on xarelto due to expense. Discussed that his CHADSVASc is 4 and it is important to restart OAC for CVA prophylaxis. He does not want to start warfarin.  Xarelto prescription is at specialty pharmacy. He and his wife agreed to go downstairs and discuss available options to get him through the donut hole. Alternative he would need to go on warfarin.  His BP is very high (188/70). He says it was low this morning. Has not taken any of his BP meds today. Advised him to take his meds as soon as he gets home and monitor for appropriate BP decrease. ED if SBP>185 despite meds. He follows with nephrology.     Update (01/07/2025):  Patient was admitted 12/7/24 to 12/8/24 with left sided weakness. Found to have subcentimeter embolic right MCA infarct.      12/17/2024: Hi, This patient got a PVI/CTI/PWI with Dr. Bowden this summer and saw me in clinic today and we feel has symptomatic atypical atrial flutter. I'd like to get him in to see Dr. Bowden and if he agrees, schedule him for a cardioversion as an outpatient. He's compliant with his Xarelto.      Today he says is is recovering from stroke. Mild DOSS. No CP, palps.  Edema relatively controlled currently.     Pt is on xarelto 15mg daily (CKD). On carvedilol 25mg BID. Creatinine 2.3 on 12/30/2024.     I have personally reviewed the patient's EKG today, which shows AFL at 75bpm. QRS is 112. QTc is 431.        Today, in good spirits. He denies any active cardiac complaints. Accompanied by his wife.     Procedure(s) (LRB):  Cardioversion or Defibrillation (N/A)  Transesophageal echo (FELICIANO) intra-procedure log documentation (N/A)     Indwelling Lines/Drains at time of discharge:  Lines/Drains/Airways       None     Hospital Course:  Patient underwent FELICIANO without evidence of SHERRILL thrombus. Proceeded with DCCV, converting from atrial fibrillation to sinus rhythm. Patient tolerated the procedure without any acute complications. Post-DCCV ECG revealed sinus rhythm at 68 bpm. Plan to continue all home medications including carvedilol 25 mg BID, Xarelto 20 gm qd. Instructed to return in 1 week for follow up ECG and in 4 weeks for clinic follow up with Dr. Bowden or Mima Lyons NP.   Patient was assessed at bedside prior to discharge, they reported feeling well and denied chest discomfort, shortness of breath, palpitations, lightheadedness, or any other acute symptoms. Discharge instructions were discussed with patient and all questions were answered. Patient was discharged home in stable condition.       Goals of Care Treatment Preferences:  Code Status: Full Code      Procedures: Transesophageal echo (FELICIANO)    Result Date: 2/3/2025    Performed for Atrial flutter FELICIANO/DCCV   Left Atrium: The left atrial appendage has a cauliflower morphology. Appendage velocity is reduced at less than 40 cm/sec. There is no thrombus in the left atrial appendage.   Left Ventricle: The left ventricle is normal in size. Normal wall thickness. There is mildly reduced systolic function with a visually estimated ejection fraction of 45 - 50%.   Right Ventricle: Normal right ventricular cavity size. Systolic  "function is normal.   Aortic Valve: There is mild aortic regurgitation.   Pericardium: There is no pericardial effusion.     Electrophysiology Procedure  Result Date: 2/3/2025    Cardioversion was successfully performed with restoration of normal sinus rhythm. I certify that I was present for the critical steps of the procedure including the diagnostic, surgical and/or interventional portions. Procedure Log documented by No documenter listed and verified by Bronson Bowden MD. Date: 2/3/2025  Time: 10:27 AM       Significant Diagnostic Studies: Cardiac Graphics: ECG: Sinus rhythm with 1st degree A-V block with PACs at 68 bpm     Pending Diagnostic Studies:       Procedure Component Value Units Date/Time    EKG 12-lead [[id number]]     Order Status: Sent Lab Status: No result             There are no hospital problems to display for this patient.    No new Assessment & Plan notes have been filed under this hospital service since the last note was generated.  Service: Cardiology      Discharged Condition: stable    Disposition: Home or Self Care    Follow Up:   Follow-up Information       Mima Lyons NP. Schedule an appointment as soon as possible for a visit in 1 month(s).    Specialty: Cardiology  Contact information:  [address] [facility] [address]  [phone]               Bronson Bowden MD. Schedule an appointment as soon as possible for a visit in 1 month(s).    Specialties: Electrophysiology, Cardiovascular Disease, Cardiology  Contact information:  [address] [facility] [address]  [phone]                           Patient Instructions:   No discharge procedures on file.  Medications:  Reconciled Home Medications:      Medication List        ASK your doctor about these medications      atorvastatin 80 MG tablet  Commonly known as: LIPITOR  Take 1 tablet (80 mg total) by mouth once daily.     BD ULTRA-FINE LO PEN NEEDLE 32 gauge x 5/32" Ndle  Generic drug: pen needle, " diabetic  USE TO ADMINISTER INSULIN 4 TIMES DAILY.     blood-glucose sensor Kayla  Gin 3, use as directed, change every 14 days , e 11.65     carvediloL 25 MG tablet  Commonly known as: COREG  Take 1 tablet (25 mg total) by mouth 2 (two) times daily.     doxazosin 1 MG tablet  Commonly known as: CARDURA  Take 1 tablet (1 mg total) by mouth every evening.     empagliflozin 10 mg tablet  Commonly known as: Jardiance  Take 1 tablet (10 mg total) by mouth once daily.     furosemide 40 MG tablet  Commonly known as: LASIX  Take 2 tablets (80 mg total) by mouth 2 (two) times a day.     gabapentin 300 MG capsule  Commonly known as: NEURONTIN  Take 1 capsule by mouth in the morning, 1 capsule midday, and 3 capsules at night.     GVOKE HYPOPEN 2-PACK 1 mg/0.2 mL Atin  Generic drug: glucagon  Inject 0.2 mLs into the skin as needed (severe hypoglycemia).     hydrALAZINE 100 MG tablet  Commonly known as: APRESOLINE  Take 1 tablet (100 mg total) by mouth every 8 (eight) hours.     hydroCHLOROthiazide 12.5 MG Tab  Take 1 tablet (12.5 mg total) by mouth once daily.     LANTUS SOLOSTAR U-100 INSULIN 100 unit/mL (3 mL) Inpn pen  Generic drug: insulin glargine U-100 (Lantus)  Inject 20 Units into the skin every morning.     magnesium oxide 400 mg (241.3 mg magnesium) tablet  Commonly known as: MAG-OX  Take 1 tablet (400 mg total) by mouth 2 (two) times daily.     meclizine 25 mg tablet  Commonly known as: ANTIVERT  Take 1 tablet (25 mg total) by mouth 3 (three) times daily as needed for Dizziness.     mycophenolate 250 mg Cap  Commonly known as: CELLCEPT  Take 2 capsules (500 mg total) by mouth 2 (two) times daily.     * NovoLOG Flexpen U-100 Insulin 100 unit/mL (3 mL) Inpn pen  Generic drug: insulin aspart U-100  Inject 10 units under the skin w/ breakfast, 7 units at lunch and dinner. Scale 180-230+2, 231-280+4, 281-330+6, 331-380+8, >380+10.  Max daily 57 units.     * insulin aspart U-100 100 unit/mL injection  Commonly known as:  NovoLOG U-100 Insulin aspart  Use in pump, max daily 100 units.     OMNIPOD 5 G6-G7 INTRO KT(GEN5) Crtg  Generic drug: insulin  cart,aut,G6/7,cntr  Use as directed.     OMNIPOD 5 G6-G7 PODS (GEN 5) Crtg  Generic drug: insulin pump cart,auto,BT,G6/7  Change every 48 hours.     predniSONE 5 MG tablet  Commonly known as: DELTASONE  Take 1 tablet (5 mg total) by mouth once daily.     tacrolimus 0.5 MG Cap  Commonly known as: PROGRAF  Take 1 capsule (0.5 mg total) by mouth once daily. Start tomorrow     TRUE METRIX GLUCOSE METER Misc  Generic drug: blood-glucose meter  Use to check blood glucose 1 times daily, to use with insurance preferred meter     TRUE METRIX GLUCOSE TEST STRIP Strp  Generic drug: blood sugar diagnostic  Use to check blood glucose 1 times daily, to use with insurance preferred meter     TRUEPLUS LANCETS 30 gauge Misc  Generic drug: lancets  Use to check blood glucose 1 times daily, to use with insurance preferred meter     valsartan 320 MG tablet  Commonly known as: DIOVAN  Take 1 tablet (320 mg total) by mouth once daily.     VITAMIN D2 1,250 mcg (50,000 unit) capsule  Generic drug: ergocalciferol  Take 1 capsule (50,000 Units total) by mouth every 7 days.     XARELTO 15 mg Tab  Generic drug: rivaroxaban  Take 1 tablet (15 mg total) by mouth daily with dinner or evening meal.           * This list has 2 medication(s) that are the same as other medications prescribed for you. Read the directions carefully, and ask your doctor or other care provider to review them with you.                  Time spent on the discharge of patient: 35 minutes    Christi Ennis PA-C  Cardiology  [facility] - Cardiology

## 2025-02-03 NOTE — ANESTHESIA POSTPROCEDURE EVALUATION
Anesthesia Post Evaluation    Patient: Ravi Zamorano    Procedure(s) Performed: Procedure(s) (LRB):  Cardioversion or Defibrillation (N/A)  Transesophageal echo (FELICIANO) intra-procedure log documentation (N/A)    Final Anesthesia Type: general      Patient location during evaluation: PACU  Patient participation: Yes- Able to Participate  Level of consciousness: awake and alert and oriented  Post-procedure vital signs: reviewed and stable  Pain management: adequate  Airway patency: patent    PONV status at discharge: No PONV  Anesthetic complications: no      Cardiovascular status: stable  Respiratory status: unassisted, spontaneous ventilation and room air  Hydration status: euvolemic  Follow-up not needed.              Vitals Value Taken Time   /78 02/03/25 1134   Temp 36.3 °C (97.3 °F) 02/03/25 1040   Pulse 69 02/03/25 1137   Resp 19 02/03/25 1137   SpO2 99 % 02/03/25 1137   Vitals shown include unfiled device data.      No case tracking events are documented in the log.      Pain/Gurmeet Score: Gurmeet Score: 10 (2/3/2025 11:00 AM)

## 2025-02-03 NOTE — ANESTHESIA PREPROCEDURE EVALUATION
02/03/2025  Ravi Zamorano is a 69 y.o., male.    Procedures:      Cardioversion or Defibrillation (Chest) - AF, FELICIANO, DCCV, MAC, GP, 3 Prep      Transesophageal echo (FELICIANO) intra-procedure log documentation   Anesthesia type: Monitor Anesthesia Care   Diagnosis: Persistent atrial fibrillation [I48.19]       Pre-operative evaluation for Procedure(s) (LRB):  Cardioversion or Defibrillation (N/A)  Transesophageal echo (FELICIANO) intra-procedure log documentation (N/A)      Encounter Diagnoses   Name Primary?    Persistent atrial fibrillation     Atrial fibrillation        Review of patient's allergies indicates:   Allergen Reactions    Nifedipine Other (See Comments)     Gingival hyperplasia       Facility-Administered Medications Prior to Admission   Medication Dose Route Frequency Provider Last Rate Last Admin    epoetin tanya injection 4,000 Units  4,000 Units Subcutaneous Q7 Days          Medications Prior to Admission   Medication Sig Dispense Refill Last Dose/Taking    atorvastatin (LIPITOR) 80 MG tablet Take 1 tablet (80 mg total) by mouth once daily. 90 tablet 3     blood sugar diagnostic Strp Use to check blood glucose 1 times daily, to use with insurance preferred meter 100 each 11     blood-glucose meter Misc Use to check blood glucose 1 times daily, to use with insurance preferred meter 1 each 0     blood-glucose sensor Kayla Gin 3, use as directed, change every 14 days , e 11.65 2 each 11     carvediloL (COREG) 25 MG tablet Take 1 tablet (25 mg total) by mouth 2 (two) times daily. 180 tablet 3     doxazosin (CARDURA) 1 MG tablet Take 1 tablet (1 mg total) by mouth every evening. 30 tablet 11     empagliflozin (JARDIANCE) 10 mg tablet Take 1 tablet (10 mg total) by mouth once daily. 90 tablet 0     ergocalciferol (ERGOCALCIFEROL) 50,000 unit Cap Take 1 capsule (50,000 Units total) by mouth every 7 days.  "4 capsule 6     furosemide (LASIX) 40 MG tablet Take 2 tablets (80 mg total) by mouth 2 (two) times a day. 360 tablet 3     gabapentin (NEURONTIN) 300 MG capsule Take 1 capsule by mouth in the morning, 1 capsule midday, and 3 capsules at night. 450 capsule 3     glucagon (GVOKE HYPOPEN 2-PACK) 1 mg/0.2 mL AtIn Inject 0.2 mLs into the skin as needed (severe hypoglycemia). 0.4 mL 2     hydrALAZINE (APRESOLINE) 100 MG tablet Take 1 tablet (100 mg total) by mouth every 8 (eight) hours. 270 tablet 3     hydroCHLOROthiazide (HYDRODIURIL) 12.5 MG Tab Take 1 tablet (12.5 mg total) by mouth once daily. 30 tablet 11     insulin aspart U-100 (NOVOLOG U-100 INSULIN ASPART) 100 unit/mL injection Use in pump, max daily 100 units.       insulin aspart U-100 (NOVOLOG) 100 unit/mL (3 mL) InPn pen Inject 10 units under the skin w/ breakfast, 7 units at lunch and dinner. Scale 180-230+2, 231-280+4, 281-330+6, 331-380+8, >380+10.  Max daily 57 units. 30 mL 11     insulin glargine U-100, Lantus, (LANTUS SOLOSTAR U-100 INSULIN) 100 unit/mL (3 mL) InPn pen Inject 20 Units into the skin every morning. 15 mL 6     insulin  cart,aut,G6/7,cntr (OMNIPOD 5 G6-G7 INTRO KT,GEN5,) Crtg Use as directed.       insulin pump cart,auto,BT,G6/7 (OMNIPOD 5 G6-G7 PODS, GEN 5,) Crtg Change every 48 hours.       lancets 30 gauge Misc Use to check blood glucose 1 times daily, to use with insurance preferred meter 100 each 3     magnesium oxide (MAG-OX) 400 mg (241.3 mg magnesium) tablet Take 1 tablet (400 mg total) by mouth 2 (two) times daily. 60 tablet 6     meclizine (ANTIVERT) 25 mg tablet Take 1 tablet (25 mg total) by mouth 3 (three) times daily as needed for Dizziness. 21 tablet 3     mycophenolate (CELLCEPT) 250 mg Cap Take 2 capsules (500 mg total) by mouth 2 (two) times daily. 120 capsule 11     pen needle, diabetic (BD ULTRA-FINE LO PEN NEEDLE) 32 gauge x 5/32" Ndle USE TO ADMINISTER INSULIN 4 TIMES DAILY. 400 each 3     predniSONE " (DELTASONE) 5 MG tablet Take 1 tablet (5 mg total) by mouth once daily. 30 tablet 11     rivaroxaban (XARELTO) 15 mg Tab Take 1 tablet (15 mg total) by mouth daily with dinner or evening meal. 30 tablet 11     tacrolimus (PROGRAF) 0.5 MG Cap Take 1 capsule (0.5 mg total) by mouth once daily. Start tomorrow 30 capsule 11     valsartan (DIOVAN) 320 MG tablet Take 1 tablet (320 mg total) by mouth once daily. 90 tablet 3             Current Facility-Administered Medications on File Prior to Encounter   Medication Dose Route Frequency Provider Last Rate Last Admin    balanced salt irrigation intra-ocular solution 1 drop  1 drop Right Eye On Call Procedure Jennifer Palacio MD        phenylephrine HCL 10% ophthalmic solution 1 drop  1 drop Right Eye PRN Jennifer Palacio MD        proparacaine 0.5 % ophthalmic solution 1 drop  1 drop Right Eye Daily PRN Jennifer Palacio MD        sodium chloride 0.9% flush 10 mL  10 mL Intravenous PRN Jeninfer Palacio MD        TETRAcaine HCl (PF) 0.5 % Drop 1 drop  1 drop Right Eye PRN Jennifer Palacio MD         Current Outpatient Medications on File Prior to Encounter   Medication Sig Dispense Refill    atorvastatin (LIPITOR) 80 MG tablet Take 1 tablet (80 mg total) by mouth once daily. 90 tablet 3    blood sugar diagnostic Strp Use to check blood glucose 1 times daily, to use with insurance preferred meter 100 each 11    blood-glucose meter Misc Use to check blood glucose 1 times daily, to use with insurance preferred meter 1 each 0    blood-glucose sensor Kayla Gin 3, use as directed, change every 14 days , e 11.65 2 each 11    carvediloL (COREG) 25 MG tablet Take 1 tablet (25 mg total) by mouth 2 (two) times daily. 180 tablet 3    doxazosin (CARDURA) 1 MG tablet Take 1 tablet (1 mg total) by mouth every evening. 30 tablet 11    empagliflozin (JARDIANCE) 10 mg tablet Take 1 tablet (10 mg total) by mouth once daily. 90 tablet 0    ergocalciferol (ERGOCALCIFEROL) 50,000 unit  Cap Take 1 capsule (50,000 Units total) by mouth every 7 days. 4 capsule 6    furosemide (LASIX) 40 MG tablet Take 2 tablets (80 mg total) by mouth 2 (two) times a day. 360 tablet 3    gabapentin (NEURONTIN) 300 MG capsule Take 1 capsule by mouth in the morning, 1 capsule midday, and 3 capsules at night. 450 capsule 3    glucagon (GVOKE HYPOPEN 2-PACK) 1 mg/0.2 mL AtIn Inject 0.2 mLs into the skin as needed (severe hypoglycemia). 0.4 mL 2    hydrALAZINE (APRESOLINE) 100 MG tablet Take 1 tablet (100 mg total) by mouth every 8 (eight) hours. 270 tablet 3    hydroCHLOROthiazide (HYDRODIURIL) 12.5 MG Tab Take 1 tablet (12.5 mg total) by mouth once daily. 30 tablet 11    insulin aspart U-100 (NOVOLOG) 100 unit/mL (3 mL) InPn pen Inject 10 units under the skin w/ breakfast, 7 units at lunch and dinner. Scale 180-230+2, 231-280+4, 281-330+6, 331-380+8, >380+10.  Max daily 57 units. 30 mL 11    insulin glargine U-100, Lantus, (LANTUS SOLOSTAR U-100 INSULIN) 100 unit/mL (3 mL) InPn pen Inject 20 Units into the skin every morning. 15 mL 6    lancets 30 gauge Misc Use to check blood glucose 1 times daily, to use with insurance preferred meter 100 each 3    magnesium oxide (MAG-OX) 400 mg (241.3 mg magnesium) tablet Take 1 tablet (400 mg total) by mouth 2 (two) times daily. 60 tablet 6    meclizine (ANTIVERT) 25 mg tablet Take 1 tablet (25 mg total) by mouth 3 (three) times daily as needed for Dizziness. 21 tablet 3    mycophenolate (CELLCEPT) 250 mg Cap Take 2 capsules (500 mg total) by mouth 2 (two) times daily. 120 capsule 11    predniSONE (DELTASONE) 5 MG tablet Take 1 tablet (5 mg total) by mouth once daily. 30 tablet 11    rivaroxaban (XARELTO) 15 mg Tab Take 1 tablet (15 mg total) by mouth daily with dinner or evening meal. 30 tablet 11    tacrolimus (PROGRAF) 0.5 MG Cap Take 1 capsule (0.5 mg total) by mouth once daily. Start tomorrow 30 capsule 11    valsartan (DIOVAN) 320 MG tablet Take 1 tablet (320 mg total) by  mouth once daily. 90 tablet 3    [DISCONTINUED] insulin lispro (HUMALOG KWIKPEN INSULIN) 100 unit/mL pen Inject 18 units w/ breakfast, 16 units w/ lunch and dinner plus scale 150-200 +2, 201-250 +4, 251-300 +6, 301-350 +8. 30 mL 4       Past Medical History:  No date: Anticoagulant long-term use  07/29/2014: Anxiety  No date: Arthritis  No date: atrial fibrillation  2017: Bilateral diabetic retinopathy  12/7/2024: Cardioembolic stroke  12/28/2016: CKD (chronic kidney disease) stage 2, GFR 60-89 ml/min  07/29/2014: CKD (chronic kidney disease) stage 4, GFR 15-29 ml/min  2014: Colon polyps  07/29/2014: Depression - situational  No date: Diabetes mellitus  07/29/2014: Diabetes type 2 since 2000 07/29/2014: Diabetic neuropathy  No date: Encounter for blood transfusion  10/03/2019: History of cardioversion  07/29/2014: History of hepatitis C, s/p successful Rx w/ [id number] - 9/ 2017      Comment:  Genotype 1a, treatment naive 10/2014 liver biopsy -                grade 1 / stage 1 Completed Harvoni w/ SVR  07/29/2014: Hyperlipidemia  No date: Hypertension  No date: Neuropathy  07/29/2014: Organ transplant candidate  No date: Pancreatitis  11/05/2016: S/P cadaveric kidney transplant 11/5/2016. ESRD secondary   to HTN/DMII  12/28/2016: Stage 3a chronic kidney disease  07/29/2014: Type 2 diabetes mellitus with stage 3a chronic kidney   disease, with long-term current use of insulin    Past Surgical History:   Procedure Laterality Date    ABLATION OF ARRHYTHMOGENIC FOCUS FOR ATRIAL FIBRILLATION N/A 6/11/2024    Procedure: Ablation atrial fibrillation;  Surgeon: Bronson Bowden MD;  Location: [facility] EP LAB;  Service: Cardiology;  Laterality: N/A;  AF, FELICIANO (cx if SR), PVI, RFA, Carto, Gen, GP, 3 Prep    ABLATION, ATRIAL FLUTTER, TYPICAL  6/11/2024    Procedure: Ablation, Atrial Flutter, Typical;  Surgeon: Bronson Bowden MD;  Location: [facility] EP LAB;  Service: Cardiology;;    APPENDECTOMY      BONE MARROW BIOPSY Left 6/26/2024     Procedure: Biopsy-bone marrow;  Surgeon: Bridger Zapata MD;  Location: [facility] ENDO (4TH FLR);  Service: Oncology;  Laterality: Left;  -pt knows to hold aspirin and eliquis as instructed by hem/onc, precall complete-Kpvt    CARDIOVERSION  2024    Procedure: Cardioversion;  Surgeon: Bronson Bowden MD;  Location: [facility] EP LAB;  Service: Cardiology;;    CATARACT EXTRACTION W/  INTRAOCULAR LENS IMPLANT Right 3/13/2024    Procedure: EXTRACTION, CATARACT, WITH IOL INSERTION;  Surgeon: Jennifer Palacio MD;  Location: [facility] OR;  Service: Ophthalmology;  Laterality: Right;    CATARACT EXTRACTION W/  INTRAOCULAR LENS IMPLANT Left 4/10/2024    Procedure: EXTRACTION, CATARACT, WITH IOL INSERTION;  Surgeon: Jennifer Palacio MD;  Location: [facility] OR;  Service: Ophthalmology;  Laterality: Left;    COLONOSCOPY N/A 2020    Procedure: COLONOSCOPY;  Surgeon: Tico Bell MD;  Location: [facility] ENDO (4TH FLR);  Service: Endoscopy;  Laterality: N/A;  ok to hold eliquis per Dr. Valadez, see telephone encounter 2020-[place]  covid test  [place]    KIDNEY TRANSPLANT      TRANSESOPHAGEAL ECHOCARDIOGRAPHY N/A 2019    Procedure: ECHOCARDIOGRAM, TRANSESOPHAGEAL;  Surgeon: Dolores Diagnostic Provider;  Location: [facility] EP LAB;  Service: Cardiology;  Laterality: N/A;    TRANSESOPHAGEAL ECHOCARDIOGRAPHY N/A 2024    Procedure: ECHOCARDIOGRAM, TRANSESOPHAGEAL;  Surgeon: Grayson Lin III, MD;  Location: [facility] EP LAB;  Service: Cardiology;  Laterality: N/A;    TREATMENT OF CARDIAC ARRHYTHMIA N/A 2019    Procedure: CARDIOVERSION;  Surgeon: Bronson Bowden MD;  Location: [facility] EP LAB;  Service: Cardiology;  Laterality: N/A;  AF, FELICIANO, DCCV, MAC, GP, 3 PREP       Social History     Tobacco Use   Smoking Status Former    Current packs/day: 0.00    Average packs/day: 0.5 packs/day for 32.0 years (16.0 ttl pk-yrs)    Types: Cigarettes    Start date: 1984    Quit date: 2016    Years since quittin.1  "  Smokeless Tobacco Never   Tobacco Comments    Pt reports that he quit in 2013, but started up again in 2015. pt reports he is currently working on quitting again       Social History     Substance and Sexual Activity   Alcohol Use Yes    Comment: Pt reports occasional beers on Sundays. Pt reports drinking daily prior to ESRD diagnosis.       Physical Activity: Inactive (5/3/2024)    Exercise Vital Sign     Days of Exercise per Week: 0 days     Minutes of Exercise per Session: 0 min       No birth history on file.  No results for input(s): "HCT" in the last 72 hours.  No results for input(s): "PLT" in the last 72 hours.  No results for input(s): "K" in the last 72 hours.  No results for input(s): "CREATININE" in the last 72 hours.  No results for input(s): "GLU" in the last 72 hours.  No results for input(s): "PT" in the last 72 hours.  There were no vitals filed for this visit.                    Pre-op Assessment          Review of Systems  Anesthesia Hx:  No problems with previous Anesthesia                Hematology/Oncology:  Hematology Normal   Oncology Normal                Hematology Comments: Last xarelto for afib last night                    Cardiovascular:     Hypertension (occ high)   Denies MI.     Dysrhythmias atrial fibrillation  Denies Angina.         PASP 70                           Pulmonary:  Pulmonary Normal   Denies COPD.  Denies Asthma.   Denies Shortness of breath.                  Renal/:  Chronic Renal Disease, CKD   11/05/2016: S/P cadaveric kidney transplant 11/5/2016. ESRD secondary   to HTN/DMII             Hepatic/GI:       Hepatitis (treated with harvoni succesfully), C              Neurological:  Denies TIA. CVA (12/7/2024: Cardioembolic stroke, mild LUE weakness, kept overnight), residual symptoms    Denies Seizures.                                Endocrine:  Diabetes, type 2, using insulin   Lantus 20 am, none this am, 5-10 prandial doses            Physical Exam  General: Well " nourished, Cooperative, Alert and Oriented    Airway:  Mallampati: II   Mouth Opening: Normal  TM Distance: Normal  Tongue: Normal  Neck ROM: Normal ROM        Anesthesia Plan  Type of Anesthesia, risks & benefits discussed:    Anesthesia Type: Gen Natural Airway  Intra-op Monitoring Plan: Standard ASA Monitors  Post Op Pain Control Plan: IV/PO Opioids PRN  Induction:  IV  Informed Consent: Informed consent signed with the Patient and all parties understand the risks and agree with anesthesia plan.  All questions answered.   ASA Score: 3  Day of Surgery Review of History & Physical: H&P Update referred to the surgeon/provider.    Ready For Surgery From Anesthesia Perspective.     .    Date/Time: 6/11/2024 7:28 AM     Performed by: Wero Atkins III, CRNA  Authorized by: Wero Atkins III, CRNA    Intubation:     Induction:  Inhalational - mask    Intubated:  Postinduction    Mask Ventilation:  Easy mask    Attempts:  1    Attempted By:  CRNA    Method of Intubation:  Video laryngoscopy    Blade:  Faye 4    Laryngeal View Grade: Grade IIA - cords partially seen      Difficult Airway Encountered?: No      Complications:  None    Airway Device:  Oral endotracheal tube    Airway Device Size:  7.5    Style/Cuff Inflation:  Cuffed (inflated to minimal occlusive pressure)    Tube secured:  23    Secured at:  The lips    Placement Verified By:  Capnometry    Complicating Factors:  None    Findings Post-Intubation:  BS equal bilateral       Latest Reference Range & Units 01/27/25 10:05   Creatinine 0.5 - 1.4 mg/dL 2.2 (H)   (H): Data is abnormally high     Latest Reference Range & Units 06/25/24 14:43   BNP 0 - 99 pg/mL 992 (H)   (H): Data is abnormally high      Vent. Rate :  75 BPM     Atrial Rate : 300 BPM      P-R Int :    ms          QRS Dur : 108 ms       QT Int : 388 ms       P-R-T Axes :  94  74 197 degrees     QTcB Int : 433 ms     Atrial flutter   Incomplete left bundle branch block   LVH with secondary  ST/T changes   Abnormal ECG   When compared with ECG of 07-Dec-2024 05:33,   Atrial flutter has replaced Atrial fibrillation   Confirmed by Chris Barron ([id number]) on 12/16/2024 3:00:12 PM           12/2024  Left Ventricle The left ventricle is normal in size. Ventricular mass is normal. Normal wall thickness. Normal wall motion. There is mildly reduced systolic function with a visually estimated ejection fraction of 45 - 50%. Grade III diastolic dysfunction.   Right Ventricle Normal right ventricular cavity size. Wall thickness is normal. Right ventricle wall motion  is normal. Systolic function is mildly reduced.   Left Atrium Left atrium is mildly dilated.   Right Atrium Normal right atrial size.   Aortic Valve The aortic valve is structurally normal. There is normal leaflet mobility. Aortic valve peak velocity is 1.1 m/s. Mean gradient is 2.6 mmHg. There is mild to moderate aortic regurgitation with a centrally directed jet.   Mitral Valve The mitral valve is structurally normal. There is normal leaflet mobility. There is moderate regurgitation with a centrally directed jet.   Tricuspid Valve The tricuspid valve is structurally normal. There is normal leaflet mobility. There is mild to moderate regurgitation with a centrally directed jet.   Pulmonic Valve The pulmonic valve is structurally normal. There is normal leaflet mobility.   IVC/SVC Normal venous pressure at 3 mmHg.   Ascending Aorta Aortic root is normal in size measuring 3.57 cm. Ascending aorta is normal measuring 3.65 cm.   Pericardium and Other Findings There is no pericardial effusion. Left pleural effusion.   Pulmonary Artery There is severe pulmonary hypertension. The estimated pulmonary artery systolic pressure is 70 mmHg.

## 2025-02-03 NOTE — HOSPITAL COURSE
Patient underwent FELICIANO without evidence of SHERRILL thrombus. Proceeded with DCCV, converting from atrial fibrillation to sinus rhythm. Patient tolerated the procedure without any acute complications. Post-DCCV ECG revealed sinus rhythm at 68 bpm. Plan to continue all home medications including carvedilol 25 mg BID, Xarelto 20 gm qd. Instructed to return in 1 week for follow up ECG and in 4 weeks for clinic follow up with Dr. Bowden or Mima Lyons NP.   Patient was assessed at bedside prior to discharge, they reported feeling well and denied chest discomfort, shortness of breath, palpitations, lightheadedness, or any other acute symptoms. Discharge instructions were discussed with patient and all questions were answered. Patient was discharged home in stable condition.

## 2025-02-03 NOTE — TRANSFER OF CARE
"Anesthesia Transfer of Care Note    Patient: Ravi Zamorano    Procedure(s) Performed: Procedure(s) (LRB):  Cardioversion or Defibrillation (N/A)  Transesophageal echo (FELICIANO) intra-procedure log documentation (N/A)    Patient location: Cath Lab    Anesthesia Type: general    Transport from OR: Transported from OR on 2-3 L/min O2 by NC with adequate spontaneous ventilation    Post pain: adequate analgesia    Post assessment: no apparent anesthetic complications    Post vital signs: stable    Level of consciousness: awake    Nausea/Vomiting: no nausea/vomiting    Transfer of care protocol was followed      Last vitals: Visit Vitals  BP (!) 171/82 (BP Location: Left arm, Patient Position: Lying)   Pulse 73   Temp 36.4 °C (97.5 °F) (Temporal)   Resp 16   Ht 6' 1" (1.854 m)   Wt 90.7 kg (200 lb)   SpO2 100%   BMI 26.39 kg/m²     "

## 2025-02-03 NOTE — DISCHARGE INSTRUCTIONS
Medications:  -Continue to take your home medications as listed on your medication list after you are discharged.    New Medications:  -None     Diet  -You may resume oral intake after you are discharged, as long you have no swallowing difficulties.    Because you have received sedation for this procedure:  -Limit activity for the remainder of the day.  -Do not smoke for at least 6 hours and until you are fully awake and alert.  -Do not drink alcoholic beverage for 24 hours.  -Do not drive for 24 hours.  -Defer important decision making until the following day.     Go to the Emergency Department if you develop:   -Bleeding  -Weakness or numbness  -Visual, gait or speech disturbance  -New chest pain, palpitations, shortness of breath, rapid heart beat, or fainting  -Fever    Follow up:  -EKG in 1 week.  -Dr. Bowden or Mima Lyons NP in 1 month. Call or message the office to schedule.    Any need to reschedule or cancel procedures, or any questions regarding your procedures should be addressed directly with the Arrhythmia Department Nurses at the following phone number: [phone].

## 2025-02-03 NOTE — NURSING
[facility] RN Kareen at bedside for handoff. Pt is AAOx4 and follows commands appropriately Vs taken and wnl and pt is free from s/s of pain/distress. EKG tech at bedside to obtain 12 lead. ANN-MARIE Barraza came to bedside to review plan of care with pt and pt's wife. Pt given discharge paperwork and education reiterated. Pt able to drink, eat, walk, and use restroom without issues. Pt's iv and tele removed. Pt is waiting to wheel out with transporter and wife.

## 2025-02-03 NOTE — H&P
[facility]  Cardiology  FELICIANO/DCCV History & Physical      Ravi Zamorano  YOB: 1955  Medical Record Number:  [id number]  Attending Physician:  Bronson Bowden MD   Date of Admission: 2/3/2025       Hospital Day:  0  Current Principal Problem:  <principal problem not specified>    Patient information was obtained from patient and past medical records.  History     Cc: FELICIANO/DCCV for AFL    HPI  Last OV with Mima Lyons NP on 01/07/25.   Mr. Zamorano is a 69 y.o. male with DM, kidney tx (2016), HFpEF, AF/AFL (PVI/PWI/CTI 6/2024), CVA (12/7/2024) here for follow up.     Background:  Ravi Zamorano has a history of DM on insulin, CKD2, history of kidney transplant in 2016 (Cr 1.5 in 2/2021), and diastolic dysfunction, who was diagnosed with rate controlled AF in 8/2019, manifesting as worsening DOSS. An echo done in 8/2019 showed an EF of 55% and severe LAE (MINA 54.3 mL/m2). He underwent DCCV in 8/2010, and was started on flecainide 100 mg bid post-procedure.     An echo in 6/2021 showed an EF of 55%. A 2 week [employer] DX monitor done in 7/2019 showed no arrhythmias.     At his 4/2023 visit, he was maintaining sinus rhythm but reporting more LH and DOSS. QRS wide on ECG--flecainide reduced to 50 mg bid. By 5/2023 QRS had narrowed, although he had an episode of syncope in 5/2023 thought to be due to hypotension 2/2 chlorthalidone.      SPECT 5/15/2023 negative for ischemia or scar. Event monitor 1/2024--one episode of AF noted 12/24/2023, and episodes of SR and junctional rhythm with HR ranging from 54-74.     At 2/2024 pt was in sinus rhythm. Reported chronic DOSS. Episodes of LH when hypotensive. Creatinine of 1.7 in 11/2023.  ms.     Pt seen by cardiology in 4/2024--back in AF. Presented to ED in 5/2024 with rate controlled AF and worsening HF sx..      Admitted in 4/2024 for anemia (Hg 5.7). GI thought it was low likelihood this was 2/2 GIB. Awaiting outpatient  hematology input. Back on eliquis. Hg stable over the past month or so.     ADHF admission on 5/2024.      5/6/2024: In summary, Ravi Zamorano is a 68 year old male with persistent AF, who underwent DCCV and flecainide initiation in 8/2019. Over the past several months he has had recurrent symptomatic AF.  Plan for PVI. Hold eliquis AM of procedure. FELICIANO--cx if sinus. Post-procedure will initiate a brief course of antiarrhythmics. Will also check for stable Hg in the weeks leading up to procedure, and postpone if Hg drifts down or if work-up identifies a source/problem that needs to be addressed.     6/11/2024: DCCV prior to ablation. PVI. LA posterior wall isolation. CTI-line.     9/19/2024: Pt is 3 mo s/p PVI/PWI/CTI-line. He is doing well from a rhythm standpoint, with no documented or symptomatic recurrence of arrhythmia since procedure.  He recently stopped his multaq. Not on xarelto due to expense. Discussed that his CHADSVASc is 4 and it is important to restart OAC for CVA prophylaxis. He does not want to start warfarin.  Xarelto prescription is at specialty pharmacy. He and his wife agreed to go downstairs and discuss available options to get him through the donut hole. Alternative he would need to go on warfarin.  His BP is very high (188/70). He says it was low this morning. Has not taken any of his BP meds today. Advised him to take his meds as soon as he gets home and monitor for appropriate BP decrease. ED if SBP>185 despite meds. He follows with nephrology.     Update (01/07/2025):  Patient was admitted 12/7/24 to 12/8/24 with left sided weakness. Found to have subcentimeter embolic right MCA infarct.      12/17/2024: Hi, This patient got a PVI/CTI/PWI with Dr. Bowden this summer and saw me in clinic today and we feel has symptomatic atypical atrial flutter. I'd like to get him in to see Dr. Bowden and if he agrees, schedule him for a cardioversion as an outpatient. He's compliant with his Xarelto.       Today he says is is recovering from stroke. Mild DOSS. No CP, palps. Edema relatively controlled currently.     Pt is on xarelto 15mg daily (CKD). On carvedilol 25mg BID. Creatinine 2.3 on 12/30/2024.     I have personally reviewed the patient's EKG today, which shows AFL at 75bpm. QRS is 112. QTc is 431.      Today, in good spirits. He denies any active cardiac complaints. Accompanied by his wife.     Rate/rhythm control: Carvedilol 25 mg BID   Anticoagulant/antiplatelets: Xarelto 15 mg qd   ECG: Atrial flutter at 75 bpm   Platelet count: 112  INR: 1.3    History of stroke:  CVA (12/07/24)  Dysphagia or odynophagia:  no  Liver Disease, esophageal disease, or known varices:  no  Upper GI Bleeding:  no  Snoring:  no   Sleep Apnea:  no  Prior neck surgery or radiation:  no  History of anesthetic difficulties:  no  Family history of anesthetic difficulties:  no  Last oral intake: last pm   Able to move neck in all directions:  yes  GLP-1 Use: no      Medications - Outpatient  Prior to Admission medications    Medication Sig Start Date End Date Taking? Authorizing Provider   atorvastatin (LIPITOR) 80 MG tablet Take 1 tablet (80 mg total) by mouth once daily. 12/16/24 12/16/25 Yes Gillies, Connor M, DO   blood-glucose sensor Kayla Gin 3, use as directed, change every 14 days , e 11.65 3/15/23  Yes Karan Lopez, APRN, FNP   carvediloL (COREG) 25 MG tablet Take 1 tablet (25 mg total) by mouth 2 (two) times daily. 6/11/24 6/6/25 Yes Lisandro Jauregui MD   doxazosin (CARDURA) 1 MG tablet Take 1 tablet (1 mg total) by mouth every evening. 10/31/24 10/31/25 Yes Francesco Lopez MD   empagliflozin (JARDIANCE) 10 mg tablet Take 1 tablet (10 mg total) by mouth once daily. 1/3/25  Yes Mima Mack MD   ergocalciferol (ERGOCALCIFEROL) 50,000 unit Cap Take 1 capsule (50,000 Units total) by mouth every 7 days. 11/21/24 2/16/25 Yes Karan Lopez, APRN, FNP   furosemide (LASIX) 40 MG tablet Take 2 tablets (80  mg total) by mouth 2 (two) times a day. 10/31/24 10/31/25 Yes Francesco Lopez MD   gabapentin (NEURONTIN) 300 MG capsule Take 1 capsule by mouth in the morning, 1 capsule midday, and 3 capsules at night. 8/7/24  Yes Mima Mack MD   hydrALAZINE (APRESOLINE) 100 MG tablet Take 1 tablet (100 mg total) by mouth every 8 (eight) hours. 10/8/24 10/8/25 Yes Nayely Vital PA-C   hydroCHLOROthiazide (HYDRODIURIL) 12.5 MG Tab Take 1 tablet (12.5 mg total) by mouth once daily. 12/16/24 12/16/25 Yes Gillies, Connor M, DO   insulin aspart U-100 (NOVOLOG) 100 unit/mL (3 mL) InPn pen Inject 10 units under the skin w/ breakfast, 7 units at lunch and dinner. Scale 180-230+2, 231-280+4, 281-330+6, 331-380+8, >380+10.  Max daily 57 units. 11/21/24  Yes Karan Lopez APRN, SALOMEP   insulin glargine U-100, Lantus, (LANTUS SOLOSTAR U-100 INSULIN) 100 unit/mL (3 mL) InPn pen Inject 20 Units into the skin every morning. 3/14/24  Yes Karan Lopez APRN, FNP   magnesium oxide (MAG-OX) 400 mg (241.3 mg magnesium) tablet Take 1 tablet (400 mg total) by mouth 2 (two) times daily. 5/31/24 5/31/25 Yes Paris Lujan PA-C   mycophenolate (CELLCEPT) 250 mg Cap Take 2 capsules (500 mg total) by mouth 2 (two) times daily. 6/16/24 6/16/25 Yes Quin Smith MD   predniSONE (DELTASONE) 5 MG tablet Take 1 tablet (5 mg total) by mouth once daily. 12/3/24  Yes Emilia Abreu NP   rivaroxaban (XARELTO) 15 mg Tab Take 1 tablet (15 mg total) by mouth daily with dinner or evening meal. 8/21/24 8/16/25 Yes Mima Mack MD   tacrolimus (PROGRAF) 0.5 MG Cap Take 1 capsule (0.5 mg total) by mouth once daily. Start tomorrow 12/8/24  Yes Lucía Wyatt MD   valsartan (DIOVAN) 320 MG tablet Take 1 tablet (320 mg total) by mouth once daily. 7/10/24 7/5/25 Yes Nayely Vital PA-C   blood sugar diagnostic Strp Use to check blood glucose 1 times daily, to use with insurance preferred meter 5/21/24    "Mima Mack MD   blood-glucose meter Misc Use to check blood glucose 1 times daily, to use with insurance preferred meter 5/21/24   Mima Mack MD   glucagon (GVOKE HYPOPEN 2-PACK) 1 mg/0.2 mL AtIn Inject 0.2 mLs into the skin as needed (severe hypoglycemia). 7/23/24   Karan Lopez APRN, FNP   insulin aspart U-100 (NOVOLOG U-100 INSULIN ASPART) 100 unit/mL injection Use in pump, max daily 100 units. 1/31/25   Karan Lopez APRN, FNP   insulin  cart,aut,G6/7,cntr (OMNIPOD 5 G6-G7 INTRO KT,GEN5,) Crtg Use as directed. 1/31/25   Karan Lopez APRN, FNP   insulin pump cart,auto,BT,G6/7 (OMNIPOD 5 G6-G7 PODS, GEN 5,) Crtg Change every 48 hours. 1/31/25   Karan Lopez APRN, FNP   lancets 30 gauge Misc Use to check blood glucose 1 times daily, to use with insurance preferred meter 5/21/24   Mima Mack MD   meclizine (ANTIVERT) 25 mg tablet Take 1 tablet (25 mg total) by mouth 3 (three) times daily as needed for Dizziness. 3/14/24   Karan Lopez APRN, FNP   pen needle, diabetic (BD ULTRA-FINE LO PEN NEEDLE) 32 gauge x 5/32" Ndle USE TO ADMINISTER INSULIN 4 TIMES DAILY. 1/13/25   Karan Lopez APRN, FNP   insulin lispro (HUMALOG KWIKPEN INSULIN) 100 unit/mL pen Inject 18 units w/ breakfast, 16 units w/ lunch and dinner plus scale 150-200 +2, 201-250 +4, 251-300 +6, 301-350 +8. 1/25/22 1/2/23  Karan Lopez APRN, FNP       Medications - Current  Scheduled Meds:  Continuous Infusions:  PRN Meds:.      Allergies  Review of patient's allergies indicates:   Allergen Reactions    Nifedipine Other (See Comments)     Gingival hyperplasia       Past Medical History  Past Medical History:   Diagnosis Date    Anticoagulant long-term use     Anxiety 07/29/2014    Arthritis     atrial fibrillation     Bilateral diabetic retinopathy 2017    Cardioembolic stroke 12/7/2024    CKD (chronic kidney disease) stage 2, GFR 60-89 ml/min 12/28/2016    CKD (chronic kidney " disease) stage 4, GFR 15-29 ml/min 07/29/2014    Colon polyps 2014    Depression - situational 07/29/2014    Diabetes mellitus     Diabetes type 2 since 2000 07/29/2014    Diabetic neuropathy 07/29/2014    Encounter for blood transfusion     History of cardioversion 10/03/2019    History of hepatitis C, s/p successful Rx w/ SVR24 - 9/2017 07/29/2014    Genotype 1a, treatment naive 10/2014 liver biopsy - grade 1 / stage 1 Completed Harvoni w/ SVR    Hyperlipidemia 07/29/2014    Hypertension     Neuropathy     Organ transplant candidate 07/29/2014    Pancreatitis     S/P cadaveric kidney transplant 11/5/2016. ESRD secondary to HTN/DMII 11/05/2016    Stage 3a chronic kidney disease 12/28/2016    Type 2 diabetes mellitus with stage 3a chronic kidney disease, with long-term current use of insulin 07/29/2014       Past Surgical History  Past Surgical History:   Procedure Laterality Date    ABLATION OF ARRHYTHMOGENIC FOCUS FOR ATRIAL FIBRILLATION N/A 6/11/2024    Procedure: Ablation atrial fibrillation;  Surgeon: Bronson Bowden MD;  Location: [facility] EP LAB;  Service: Cardiology;  Laterality: N/A;  AF, FELICIANO (cx if SR), PVI, RFA, Carto, Gen, GP, 3 Prep    ABLATION, ATRIAL FLUTTER, TYPICAL  6/11/2024    Procedure: Ablation, Atrial Flutter, Typical;  Surgeon: Bronson Bowden MD;  Location: [facility] EP LAB;  Service: Cardiology;;    APPENDECTOMY      BONE MARROW BIOPSY Left 6/26/2024    Procedure: Biopsy-bone marrow;  Surgeon: Bridger Zapata MD;  Location: [facility] ENDO ([address] [place] [address]);  Service: Oncology;  Laterality: Left;  6/24-pt knows to hold aspirin and eliquis as instructed by hem/onc, precall complete-Kpvt    CARDIOVERSION  6/11/2024    Procedure: Cardioversion;  Surgeon: Bronson Bowden MD;  Location: [facility] EP LAB;  Service: Cardiology;;    CATARACT EXTRACTION W/  INTRAOCULAR LENS IMPLANT Right 3/13/2024    Procedure: EXTRACTION, CATARACT, WITH IOL INSERTION;  Surgeon: Jennifer Palacio MD;  Location: [facility] OR;  Service:  Ophthalmology;  Laterality: Right;    CATARACT EXTRACTION W/  INTRAOCULAR LENS IMPLANT Left 4/10/2024    Procedure: EXTRACTION, CATARACT, WITH IOL INSERTION;  Surgeon: Jennifer Palacio MD;  Location: [facility] OR;  Service: Ophthalmology;  Laterality: Left;    COLONOSCOPY N/A 2020    Procedure: COLONOSCOPY;  Surgeon: Tico Bell MD;  Location: [facility] ENDO (4TH FLR);  Service: Endoscopy;  Laterality: N/A;  ok to hold eliquis per Dr. Valadez, see telephone encounter 2020-[place]  covid test  [place]    KIDNEY TRANSPLANT      TRANSESOPHAGEAL ECHOCARDIOGRAPHY N/A 2019    Procedure: ECHOCARDIOGRAM, TRANSESOPHAGEAL;  Surgeon: Dosc Diagnostic Provider;  Location: [facility] EP LAB;  Service: Cardiology;  Laterality: N/A;    TRANSESOPHAGEAL ECHOCARDIOGRAPHY N/A 2024    Procedure: ECHOCARDIOGRAM, TRANSESOPHAGEAL;  Surgeon: Grayson Lin III, MD;  Location: [facility] EP LAB;  Service: Cardiology;  Laterality: N/A;    TREATMENT OF CARDIAC ARRHYTHMIA N/A 2019    Procedure: CARDIOVERSION;  Surgeon: Bronson Bowden MD;  Location: [facility] EP LAB;  Service: Cardiology;  Laterality: N/A;  AF, FELICIANO, DCCV, MAC, GP, 3 PREP       Social History  Social History     Socioeconomic History    Marital status:    Occupational History     Employer: [employer]   Tobacco Use    Smoking status: Former     Current packs/day: 0.00     Average packs/day: 0.5 packs/day for 32.0 years (16.0 ttl pk-yrs)     Types: Cigarettes     Start date: 1984     Quit date: 2016     Years since quittin.1    Smokeless tobacco: Never    Tobacco comments:     Pt reports that he quit in , but started up again in . pt reports he is currently working on quitting again   Substance and Sexual Activity    Alcohol use: Yes     Comment: Pt reports occasional beers on Sundays. Pt reports drinking daily prior to ESRD diagnosis.    Drug use: No    Sexual activity: Yes     Partners: Female   Social History Narrative      for 14 years     for 38 years    2 children ages 41, 42     Social Drivers of Health     Financial Resource Strain: Low Risk  (12/7/2024)    Overall Financial Resource Strain (CARDIA)     Difficulty of Paying Living Expenses: Not hard at all   Food Insecurity: No Food Insecurity (12/7/2024)    Hunger Vital Sign     Worried About Running Out of Food in the Last Year: Never true     Ran Out of Food in the Last Year: Never true   Transportation Needs: No Transportation Needs (12/7/2024)    TRANSPORTATION NEEDS     Transportation : No   Physical Activity: Inactive (5/3/2024)    Exercise Vital Sign     Days of Exercise per Week: 0 days     Minutes of Exercise per Session: 0 min   Stress: No Stress Concern Present (12/7/2024)    [place] [employer] - Occupational Stress Questionnaire     Feeling of Stress : Not at all   Housing Stability: Low Risk  (12/7/2024)    Housing Stability Vital Sign     Unable to Pay for Housing in the Last Year: No     Homeless in the Last Year: No       ROS  Review of Systems   Constitutional: Negative for chills.   HENT: Negative.     Eyes: Negative.    Cardiovascular:  Negative for chest pain, dyspnea on exertion and palpitations.   Respiratory: Negative.  Negative for shortness of breath and sleep disturbances due to breathing.    Endocrine: Negative.    Musculoskeletal: Negative.    Gastrointestinal:  Negative for hematemesis, melena, nausea and vomiting.   Genitourinary: Negative.    Neurological: Negative.    Psychiatric/Behavioral: Negative.  Negative for altered mental status.    Allergic/Immunologic: Negative.    Physical Examination     Vital Signs  24 Hour VS Range    Temp:  [97.5 °F (36.4 °C)]   Pulse:  [73]   Resp:  [16]   BP: (166-171)/(82-87)   SpO2:  [100 %]   No intake or output data in the 24 hours ending 02/03/25 0842      Physical Exam:   Physical Exam  Constitutional:       General: He is not in acute distress.     Appearance: Normal  "appearance. He is normal weight. He is not ill-appearing.   HENT:      Head: Normocephalic and atraumatic.      Nose: Nose normal. No congestion or rhinorrhea.      Mouth/Throat:      Mouth: Mucous membranes are moist.      Pharynx: Oropharynx is clear. No oropharyngeal exudate or posterior oropharyngeal erythema.   Eyes:      General:         Right eye: No discharge.         Left eye: No discharge.      Extraocular Movements: Extraocular movements intact.      Conjunctiva/sclera: Conjunctivae normal.   Cardiovascular:      Rate and Rhythm: Normal rate. Rhythm irregular.   Pulmonary:      Effort: Pulmonary effort is normal. No respiratory distress.      Breath sounds: Normal breath sounds. No wheezing or rales.   Musculoskeletal:         General: No swelling. Normal range of motion.      Cervical back: Normal range of motion. No rigidity or tenderness.      Right lower leg: No edema.      Left lower leg: No edema.   Skin:     General: Skin is warm and dry.      Coloration: Skin is not jaundiced.      Findings: No bruising or lesion.   Neurological:      General: No focal deficit present.      Mental Status: He is alert and oriented to person, place, and time. Mental status is at baseline.   Psychiatric:         Mood and Affect: Mood normal.         Behavior: Behavior normal.         Thought Content: Thought content normal.         Judgment: Judgment normal.         Data       Recent Labs   Lab 01/27/25  1005   WBC 5.44   HGB 9.6*   HCT 34.2*   *        Recent Labs   Lab 01/27/25  1005   INR 1.3*        Recent Labs   Lab 01/27/25  1005      K 4.3      CO2 23   BUN 37*   CREATININE 2.2*   ANIONGAP 11   CALCIUM 8.6*        No results for input(s): "PROT", "ALBUMIN", "BILITOT", "ALKPHOS", "AST", "ALT" in the last 168 hours.     No results for input(s): "TROPONINI" in the last 168 hours.     BNP (pg/mL)   Date Value   06/25/2024 992 (H)   06/15/2024 3,203 (H)   06/04/2024 3,313 (H)   05/31/2024 3,646 " "(H)   05/17/2024 4,426 (H)       No results for input(s): "LABBLOO" in the last 168 hours.     Assessment & Plan     #Atrial fibrillation   -patient presents for FELICIANO/DCCV  -on Xarelto for CVA prophylaxis, last dose yesterday evening.     Dr. Bowden Plan from 01/07/25:   Will proceed with FELICIANO/DCCV with longer term plan for ablation. Do not sedate for 6 weeks following CVA     TTE 12/07/24    Left Ventricle: The left ventricle is normal in size. Ventricular mass is normal. Normal wall thickness. There is mildly reduced systolic function with a visually estimated ejection fraction of 45 - 50%. Grade III diastolic dysfunction. LV function may be undererstimated in the setting of frequent ectopy    Right Ventricle: Normal right ventricular cavity size. Wall thickness is normal. Right ventricle wall motion  is normal. Systolic function is mildly reduced.    Left Atrium: Left atrium is mildly dilated.    Aortic Valve: There is mild to moderate aortic regurgitation with a centrally directed jet.    Mitral Valve: There is moderate regurgitation with a centrally directed jet.    Tricuspid Valve: There is mild to moderate regurgitation with a centrally directed jet.    Pulmonary Artery: There is severe pulmonary hypertension. The estimated pulmonary artery systolic pressure is 70 mmHg.    IVC/SVC: Normal venous pressure at 3 mmHg.    Pericardium: There is no pericardial effusion. Left pleural effusion.      TTE 06/16/24     Left Ventricle: The left ventricle is normal in size. Ventricular mass is normal. Mild septal thickening. Regional wall motion abnormalities present. See diagram for wall motion findings. Septal motion is abnormal. There is normal systolic function with a visually estimated ejection fraction of 55 - 60%. Ejection fraction by visual approximation is 58%.    Right Ventricle: Normal right ventricular cavity size. Wall thickness is normal. Systolic function is normal.    Tricuspid Valve: There is mild " regurgitation.    Pulmonary Artery: There is severe pulmonary hypertension. The estimated pulmonary artery systolic pressure is 72 mmHg.    IVC/SVC: Normal venous pressure at 3 mmHg.    Pericardium: There is a small posterior effusion at base and none to trivial otherwise.    -No absolute contraindications of esophageal stricture, tumor, perforation, laceration,or diverticulum and/or active GI bleed.  -The risks, benefits & alternatives of the procedure were explained to the patient.   -The risks of transesophageal echo include but are not limited to:  Dental trauma, esophageal trauma/perforation, bleeding, laryngospasm/brochospasm, aspiration, sore throat/hoarseness, & dislodgement of the endotracheal tube/nasogastric tube (where applicable).    -The risks of moderate sedation include hypotension, respiratory depression, arrhythmias, bronchospasm, & death.    -Prior to procedure, extensive discussion with patient regarding risks and benefits of DCCV at bedside today. The patient voices understanding, all questions have been answered, and patient would like to proceed.  -Informed consent was obtained. The patient is agreeable to proceed with the procedure and all questions and concerns addressed.    Case was discussed with an attending physician prior to procedure.    Christi Ennis PA-C  [facility]

## 2025-02-04 LAB
OHS QRS DURATION: 122 MS
OHS QTC CALCULATION: 444 MS

## 2025-02-07 ENCOUNTER — CLINICAL SUPPORT (OUTPATIENT)
Dept: NEPHROLOGY | Facility: CLINIC | Age: 70
End: 2025-02-07
Payer: MEDICARE

## 2025-02-07 VITALS
WEIGHT: 199.94 LBS | HEART RATE: 76 BPM | BODY MASS INDEX: 26.38 KG/M2 | OXYGEN SATURATION: 97 % | SYSTOLIC BLOOD PRESSURE: 126 MMHG | DIASTOLIC BLOOD PRESSURE: 64 MMHG

## 2025-02-07 DIAGNOSIS — N18.30 STAGE 3 CHRONIC KIDNEY DISEASE, UNSPECIFIED WHETHER STAGE 3A OR 3B CKD: Primary | ICD-10-CM

## 2025-02-07 DIAGNOSIS — D64.9 SYMPTOMATIC ANEMIA: ICD-10-CM

## 2025-02-07 PROCEDURE — 99999 PR PBB SHADOW E&M-EST. PATIENT-LVL II: CPT | Mod: PBBFAC,HCNC,,

## 2025-02-10 ENCOUNTER — HOSPITAL ENCOUNTER (OUTPATIENT)
Dept: CARDIOLOGY | Facility: CLINIC | Age: 70
Discharge: HOME | End: 2025-02-10
Payer: MEDICARE

## 2025-02-10 ENCOUNTER — CLINICAL SUPPORT (OUTPATIENT)
Dept: DIABETES | Facility: CLINIC | Age: 70
End: 2025-02-10
Payer: MEDICARE

## 2025-02-10 DIAGNOSIS — E11.3393 TYPE 2 DIABETES MELLITUS WITH BOTH EYES AFFECTED BY MODERATE NONPROLIFERATIVE RETINOPATHY WITHOUT MACULAR EDEMA, WITHOUT LONG-TERM CURRENT USE OF INSULIN: ICD-10-CM

## 2025-02-10 DIAGNOSIS — E11.22 TYPE 2 DIABETES MELLITUS WITH STAGE 3A CHRONIC KIDNEY DISEASE, WITH LONG-TERM CURRENT USE OF INSULIN: ICD-10-CM

## 2025-02-10 DIAGNOSIS — Z79.4 TYPE 2 DIABETES MELLITUS WITH STAGE 3A CHRONIC KIDNEY DISEASE, WITH LONG-TERM CURRENT USE OF INSULIN: ICD-10-CM

## 2025-02-10 DIAGNOSIS — N18.31 TYPE 2 DIABETES MELLITUS WITH STAGE 3A CHRONIC KIDNEY DISEASE, WITH LONG-TERM CURRENT USE OF INSULIN: ICD-10-CM

## 2025-02-10 DIAGNOSIS — I48.19 PERSISTENT ATRIAL FIBRILLATION: ICD-10-CM

## 2025-02-10 LAB
OHS QRS DURATION: 118 MS
OHS QTC CALCULATION: 437 MS

## 2025-02-10 PROCEDURE — 93010 ELECTROCARDIOGRAM REPORT: CPT | Mod: HCNC,S$GLB,, | Performed by: INTERNAL MEDICINE

## 2025-02-10 PROCEDURE — 93005 ELECTROCARDIOGRAM TRACING: CPT | Mod: HCNC,S$GLB,, | Performed by: INTERNAL MEDICINE

## 2025-02-10 RX ORDER — INSULIN PMP CART,AUT,G6/7,CNTR
EACH SUBCUTANEOUS
Qty: 45 EACH | Refills: 3 | Status: SHIPPED | OUTPATIENT
Start: 2025-02-10

## 2025-02-10 RX ORDER — INSULIN PMP CART,AUT,G6/7,CNTR
EACH SUBCUTANEOUS
Qty: 1 EACH | Refills: 1 | Status: SHIPPED | OUTPATIENT
Start: 2025-02-10

## 2025-02-10 RX ORDER — INSULIN GLARGINE 100 [IU]/ML
20 INJECTION, SOLUTION SUBCUTANEOUS EVERY MORNING
Qty: 15 ML | Refills: 6 | Status: SHIPPED | OUTPATIENT
Start: 2025-02-10

## 2025-02-10 NOTE — PROGRESS NOTES
Diabetes Care Specialist Progress Note  Author: Argelia Bridges RD  Date: 2/10/2025    Intake    Program Intake  Reason for Diabetes Program Visit:: Intervention  Type of Intervention:: Individual  Individual: Education  Education: Insulin Pump Evaluation  Current diabetes risk level:: moderate  In the last month, have you used the ER or been admitted to the hospital: Yes  Was the ER or hospital admission related to diabetes?: No  Permission to speak with others about care:: yes (spouse present at visit)    Current Diabetes Treatment: Insulin  Method of insulin delivery?: Injections    Continuous Glucose Monitoring  Patient has CGM: Yes  Personal CGM type:: Freestyle Gin    Lab Results   Component Value Date    HGBA1C 8.6 (H) 01/27/2025       There is no height or weight on file to calculate BMI.    Lifestyle Coping Support & Clinical    Lifestyle/Coping/Support  Learning Barriers:: None  Psychosocial/Coping Skills Assessment Completed: : No  Deffered due to:: Time  Area of need?: Deferred    Problem Review  Active Comorbidities: Neuropathy, Hypertension, Hyperlipidemia/Dyslipidemia, Cardiovascular Disease, Chronic Kidney Disease    Diabetes Self-Management Skills Assessment    Medication Skills Assessment  Patient is able to identify current diabetes medications, dosages, and appropriate timing of medications.: yes  Patient is  aware that some diabetes medications can cause low blood sugar?: Yes  Medication Skills Assessment Completed:: Yes  Assessment indicates:: Knowledge deficit, Instruction Needed  Area of need?: Yes    Diabetes Disease Process/Treatment Options  Diabetes Disease Process/Treatment Options: Skills Assessment Completed: No  Deferred due to:: Time  Area of need?: Deferred    Nutrition/Healthy Eating  Meal Plan 24 Hour Recall - Breakfast: 2 biscuits with butter and SF syrup  Meal Plan 24 Hour Recall - Lunch: sometimes skips  Meal Plan 24 Hour Recall - Dinner: 1/2 chicken sandwich, onion  rings  Meal Plan 24 Hour Recall - Snack: minimal, sometimes oreo thins  Nutrition/Healthy Eating Skills Assessment Completed:: Yes  Assessment indicates:: Knowledge deficit, Instruction Needed  Area of need?: Yes    Physical Activity/Exercise  Physical Activity/Exercise Skills Assessment Completed: : No  Deffered due to:: Time  Area of need?: Deferred    Home Blood Glucose Monitoring  Patient states that blood sugar is checked at home daily.: yes  Monitoring Method:: personal continuous glucose monitor  Personal CGM type:: Freestyle Gin  Home Blood Glucose Monitoring Skills Assessment Completed: : Yes  Assessment indicates:: Adequate understanding  Area of need?: No    Acute Complications  Acute Complications Skills Assessment Completed: : No  Deffered due to:: Time  Area of need?: Deferred    Chronic Complications  Reviewed health maintenance: yes  Chronic Complications Skills Assessment Completed: : No  Deferred due to:: Time  Area of need?: Deferred    Assessment Summary and Plan    Based on today's diabetes care assessment, the following areas of need were identified:      Identified Areas of Need      Medication/Current Diabetes Treatment: Yes, see care plan.   Lifestyle Coping/Support: Deferred   Diabetes Disease Process/Treatment Options: Deferred   Nutrition/Healthy Eating: Yes , see care plan.   Physical Activity/Exercise: Deferred    Home Blood Glucose Monitoring: No    Acute Complications: Deferred    Chronic Complications: Deferred     Today's interventions were provided through individual discussion, instruction, and written materials were provided.      Patient verbalized understanding of instruction and written materials.  Pt was able to return back demonstration of instructions today. Patient understood key points, needs reinforcement and further instruction.     Diabetes Self-Management Care Plan:    Today's Diabetes Self-Management Care Plan was developed with Ravi's input. Ravi has agreed to  work toward the following goal(s) to improve his/her overall diabetes control.      Care Plan: Diabetes Management   Updates made since 2/11/2024 12:00 AM        Problem: Medications         Goal: Pt agrees to review insulin pumps discussed during appt to determine which insulin pump suits pt leee best.    Start Date: 2/10/2025   Expected End Date: 3/10/2025   This Visit's Progress: Deferred   Priority: High   Barriers: Knowledge deficit   Note:    Patient is interested in insulin pump therapy.     Insulin Pump Evaluation Check List:      Introduction to Insulin Pump Therapy:  Reviewed basics of insulin pump therapy    Encouraged patient to check with their insurance provider regarding information such as possible costs associated with initial and monthly pump costs.      Carbohydrate Counting Skills Evaluation:   Reviewed basic Carbohydrate Counting principles--Food groups, label reading, use of dry measuring cups for accurate portion sizes.      Insulin Pump Options:   Reviewed major insulin pump types:   Medtronic  Tandem: T-slim X 2, Mobi  Insulet:  Omni Pod 5  iLet pump  Discussed unique features of each pump.   Patient was able to view and practice actual device use--using demo pumps and virtual pumps  Discussed the integration of CGM into the pumps to provide automatic adjustments based on the CGM data         Problem: Healthy Eating         Goal: Pt agrees to look at food labels for total grams of carbohydrates and serving size to prepare for carb counting with insulin pump.    Start Date: 2/10/2025   Expected End Date: 5/10/2025   This Visit's Progress: Deferred   Priority: Medium   Barriers: Knowledge deficit   Note:    Reviewed basic Carbohydrate Counting principles--Food groups, label reading, use of dry measuring cups for accurate portion sizes       Task: Reviewed the sources and role of Carbohydrate, Protein, and Fat and how each nutrient impacts blood sugar. Completed 2/10/2025        Task:  Explained how to count carbohydrates using the food label and the use of dry measuring cups for accurate carb counting. Completed 2/10/2025        Task: Review the importance of balancing carbohydrates with each meal using portion control techniques to count servings of carbohydrate and label reading to identify serving size and amount of total carbs per serving. Completed 2/10/2025        Follow Up Plan     Follow up in about 4 weeks (around 3/10/2025) for Omnipod 5 start.    Today's care plan and follow up schedule was discussed with patient.  Ravi verbalized understanding of the care plan, goals, and agrees to follow up plan.        The patient was encouraged to communicate with his/her health care provider/physician and care team regarding his/her condition(s) and treatment.  I provided the patient with my contact information today and encouraged to contact me via phone or [facility] Portal as needed.     Length of Visit   Total Time: 60 Minutes

## 2025-02-13 ENCOUNTER — TELEPHONE (OUTPATIENT)
Dept: INTERNAL MEDICINE | Facility: CLINIC | Age: 70
End: 2025-02-13
Payer: MEDICARE

## 2025-02-13 NOTE — TELEPHONE ENCOUNTER
----- Message from Mendy sent at 2/12/2025  2:57 PM CST -----  Contact: [phone]  [email or web address]     Patient is calling for Medical Advice regarding:pt is calling regards to a VERY important messages and needs a call as soon as possible.    How long has patient had these symptoms:    Pharmacy name and phone#:    Patient wants a call back or thru [facility]:call back     Comments:    Please advise patient replies from provider may take up to 48 hours.

## 2025-02-13 NOTE — TELEPHONE ENCOUNTER
----- Message from Syl sent at 2/13/2025  9:35 AM CST -----  Contact: [phone]  Patient is returning a phone call.    Who left a message for the patient: Dr Mack's office    Does patient know what this is regarding:  no    Would you like a call back, or a response through your [facility] portal?:   phone    Comments:

## 2025-02-14 NOTE — PROGRESS NOTES
Hgb 9.6/ Hct 34.2     2nd Dose Retacrit 4000 units .Per Form # [id number]. Pt was educated on mechanism of action of medication and possible side effects. Handout was given also.All concerns and questions was addressed. Pt was ask to wait 15 min after administration ,tolerated well, no reactions noted. Pt was given 2 week appt.      GFR 32.0

## 2025-02-18 ENCOUNTER — TELEPHONE (OUTPATIENT)
Dept: INTERNAL MEDICINE | Facility: CLINIC | Age: 70
End: 2025-02-18
Payer: MEDICARE

## 2025-02-18 NOTE — TELEPHONE ENCOUNTER
----- Message from Brigette sent at 2/18/2025 11:49 AM CST -----  Contact: pt @ [phone]  Ravi Zamorano calling regarding Patient Advice (message) for #pt is calling to speak with nurse that he receive his omnipod on yesterday, asking for call back

## 2025-02-20 ENCOUNTER — HOSPITAL ENCOUNTER (INPATIENT)
Facility: HOSPITAL | Age: 70
LOS: 1 days | Discharge: HOME | DRG: 308 | End: 2025-02-22
Attending: STUDENT IN AN ORGANIZED HEALTH CARE EDUCATION/TRAINING PROGRAM | Admitting: INTERNAL MEDICINE
Payer: MEDICARE

## 2025-02-20 DIAGNOSIS — R00.1 BRADYCARDIA: ICD-10-CM

## 2025-02-20 DIAGNOSIS — R55 SYNCOPE: ICD-10-CM

## 2025-02-20 DIAGNOSIS — R55 SYNCOPE AND COLLAPSE: ICD-10-CM

## 2025-02-20 DIAGNOSIS — I50.32 CHRONIC DIASTOLIC CONGESTIVE HEART FAILURE: ICD-10-CM

## 2025-02-20 DIAGNOSIS — R07.9 CHEST PAIN: ICD-10-CM

## 2025-02-20 PROBLEM — I48.92 ATRIAL FLUTTER: Status: ACTIVE | Noted: 2025-02-20

## 2025-02-20 PROBLEM — E87.20 METABOLIC ACIDOSIS: Status: ACTIVE | Noted: 2025-02-20

## 2025-02-20 PROBLEM — N17.9 ACUTE KIDNEY INJURY SUPERIMPOSED ON CHRONIC KIDNEY DISEASE: Status: ACTIVE | Noted: 2025-02-20

## 2025-02-20 PROBLEM — E87.6 HYPOKALEMIA: Status: ACTIVE | Noted: 2025-02-20

## 2025-02-20 PROBLEM — N18.9 ACUTE KIDNEY INJURY SUPERIMPOSED ON CHRONIC KIDNEY DISEASE: Status: ACTIVE | Noted: 2025-02-20

## 2025-02-20 PROBLEM — E87.1 HYPONATREMIA: Status: ACTIVE | Noted: 2025-02-20

## 2025-02-20 LAB
ALBUMIN SERPL BCP-MCNC: 3.5 G/DL (ref 3.5–5.2)
ALP SERPL-CCNC: 45 U/L (ref 40–150)
ALT SERPL W/O P-5'-P-CCNC: <5 U/L (ref 10–44)
ANION GAP SERPL CALC-SCNC: 16 MMOL/L (ref 8–16)
AST SERPL-CCNC: 14 U/L (ref 10–40)
BASOPHILS # BLD AUTO: 0.03 K/UL (ref 0–0.2)
BASOPHILS NFR BLD: 0.5 % (ref 0–1.9)
BILIRUB SERPL-MCNC: 0.3 MG/DL (ref 0.1–1)
BNP SERPL-MCNC: 1055 PG/ML (ref 0–99)
BUN SERPL-MCNC: 83 MG/DL (ref 8–23)
CALCIUM SERPL-MCNC: 9.5 MG/DL (ref 8.7–10.5)
CHLORIDE SERPL-SCNC: 95 MMOL/L (ref 95–110)
CO2 SERPL-SCNC: 18 MMOL/L (ref 23–29)
CREAT SERPL-MCNC: 3 MG/DL (ref 0.5–1.4)
DIFFERENTIAL METHOD BLD: ABNORMAL
EOSINOPHIL # BLD AUTO: 0 K/UL (ref 0–0.5)
EOSINOPHIL NFR BLD: 0.5 % (ref 0–8)
ERYTHROCYTE [DISTWIDTH] IN BLOOD BY AUTOMATED COUNT: 16.6 % (ref 11.5–14.5)
EST. GFR  (NO RACE VARIABLE): 22.1 ML/MIN/1.73 M^2
GLUCOSE SERPL-MCNC: 159 MG/DL (ref 70–110)
HCT VFR BLD AUTO: 33.6 % (ref 40–54)
HCV AB SERPL QL IA: REACTIVE
HGB BLD-MCNC: 10.1 G/DL (ref 14–18)
HIV 1+2 AB+HIV1 P24 AG SERPL QL IA: NORMAL
IMM GRANULOCYTES # BLD AUTO: 0.03 K/UL (ref 0–0.04)
IMM GRANULOCYTES NFR BLD AUTO: 0.5 % (ref 0–0.5)
LYMPHOCYTES # BLD AUTO: 0.4 K/UL (ref 1–4.8)
LYMPHOCYTES NFR BLD: 7.5 % (ref 18–48)
MAGNESIUM SERPL-MCNC: 2.6 MG/DL (ref 1.6–2.6)
MCH RBC QN AUTO: 23.2 PG (ref 27–31)
MCHC RBC AUTO-ENTMCNC: 30.1 G/DL (ref 32–36)
MCV RBC AUTO: 77 FL (ref 82–98)
MONOCYTES # BLD AUTO: 1.2 K/UL (ref 0.3–1)
MONOCYTES NFR BLD: 21.7 % (ref 4–15)
NEUTROPHILS # BLD AUTO: 3.9 K/UL (ref 1.8–7.7)
NEUTROPHILS NFR BLD: 69.3 % (ref 38–73)
NRBC BLD-RTO: 0 /100 WBC
PLATELET # BLD AUTO: 133 K/UL (ref 150–450)
PMV BLD AUTO: ABNORMAL FL (ref 9.2–12.9)
POTASSIUM SERPL-SCNC: 3 MMOL/L (ref 3.5–5.1)
PROT SERPL-MCNC: 6.6 G/DL (ref 6–8.4)
RBC # BLD AUTO: 4.36 M/UL (ref 4.6–6.2)
SODIUM SERPL-SCNC: 129 MMOL/L (ref 136–145)
TROPONIN I SERPL DL<=0.01 NG/ML-MCNC: 102 NG/L (ref 0–35)
TROPONIN I SERPL DL<=0.01 NG/ML-MCNC: 170 NG/L (ref 0–35)
TSH SERPL DL<=0.005 MIU/L-ACNC: 1.77 UIU/ML (ref 0.4–4)
WBC # BLD AUTO: 5.58 K/UL (ref 3.9–12.7)

## 2025-02-20 PROCEDURE — 63600175 PHARM REV CODE 636 W HCPCS: Mod: HCNC | Performed by: FAMILY MEDICINE

## 2025-02-20 PROCEDURE — 25000003 PHARM REV CODE 250: Mod: HCNC | Performed by: FAMILY MEDICINE

## 2025-02-20 PROCEDURE — 84443 ASSAY THYROID STIM HORMONE: CPT | Mod: HCNC

## 2025-02-20 PROCEDURE — 85025 COMPLETE CBC W/AUTO DIFF WBC: CPT | Mod: HCNC

## 2025-02-20 PROCEDURE — G0378 HOSPITAL OBSERVATION PER HR: HCPCS | Mod: HCNC

## 2025-02-20 PROCEDURE — 84484 ASSAY OF TROPONIN QUANT: CPT | Mod: 91,HCNC

## 2025-02-20 PROCEDURE — 93005 ELECTROCARDIOGRAM TRACING: CPT | Mod: HCNC

## 2025-02-20 PROCEDURE — 25000003 PHARM REV CODE 250: Mod: HCNC

## 2025-02-20 PROCEDURE — 83880 ASSAY OF NATRIURETIC PEPTIDE: CPT | Mod: HCNC

## 2025-02-20 PROCEDURE — 86803 HEPATITIS C AB TEST: CPT | Mod: HCNC | Performed by: PHYSICIAN ASSISTANT

## 2025-02-20 PROCEDURE — 99285 EMERGENCY DEPT VISIT HI MDM: CPT | Mod: 25,HCNC

## 2025-02-20 PROCEDURE — 80053 COMPREHEN METABOLIC PANEL: CPT | Mod: HCNC

## 2025-02-20 PROCEDURE — 96360 HYDRATION IV INFUSION INIT: CPT | Mod: HCNC

## 2025-02-20 PROCEDURE — 87389 HIV-1 AG W/HIV-1&-2 AB AG IA: CPT | Mod: HCNC | Performed by: PHYSICIAN ASSISTANT

## 2025-02-20 PROCEDURE — 93010 ELECTROCARDIOGRAM REPORT: CPT | Mod: HCNC,,, | Performed by: INTERNAL MEDICINE

## 2025-02-20 PROCEDURE — 83735 ASSAY OF MAGNESIUM: CPT | Mod: HCNC

## 2025-02-20 RX ORDER — TALC
6 POWDER (GRAM) TOPICAL NIGHTLY PRN
Status: DISCONTINUED | OUTPATIENT
Start: 2025-02-20 | End: 2025-02-22 | Stop reason: HOSPADM

## 2025-02-20 RX ORDER — POTASSIUM CHLORIDE 20 MEQ/1
40 TABLET, EXTENDED RELEASE ORAL
Status: DISPENSED | OUTPATIENT
Start: 2025-02-21 | End: 2025-02-21

## 2025-02-20 RX ORDER — SIMETHICONE 80 MG
1 TABLET,CHEWABLE ORAL 4 TIMES DAILY PRN
Status: DISCONTINUED | OUTPATIENT
Start: 2025-02-20 | End: 2025-02-22 | Stop reason: HOSPADM

## 2025-02-20 RX ORDER — INSULIN GLARGINE 100 [IU]/ML
10 INJECTION, SOLUTION SUBCUTANEOUS DAILY
Status: DISCONTINUED | OUTPATIENT
Start: 2025-02-21 | End: 2025-02-22 | Stop reason: HOSPADM

## 2025-02-20 RX ORDER — PROCHLORPERAZINE EDISYLATE 5 MG/ML
5 INJECTION INTRAMUSCULAR; INTRAVENOUS EVERY 6 HOURS PRN
Status: DISCONTINUED | OUTPATIENT
Start: 2025-02-20 | End: 2025-02-22 | Stop reason: HOSPADM

## 2025-02-20 RX ORDER — ELECTROLYTES/DEXTROSE
400 SOLUTION, ORAL ORAL ONCE
Status: DISCONTINUED | OUTPATIENT
Start: 2025-02-21 | End: 2025-02-22 | Stop reason: HOSPADM

## 2025-02-20 RX ORDER — DOXAZOSIN 1 MG/1
1 TABLET ORAL ONCE
Status: DISCONTINUED | OUTPATIENT
Start: 2025-02-20 | End: 2025-02-22

## 2025-02-20 RX ORDER — ACETAMINOPHEN 325 MG/1
650 TABLET ORAL EVERY 4 HOURS PRN
Status: DISCONTINUED | OUTPATIENT
Start: 2025-02-20 | End: 2025-02-22 | Stop reason: HOSPADM

## 2025-02-20 RX ORDER — IBUPROFEN 200 MG
24 TABLET ORAL
Status: DISCONTINUED | OUTPATIENT
Start: 2025-02-20 | End: 2025-02-22 | Stop reason: HOSPADM

## 2025-02-20 RX ORDER — ATORVASTATIN CALCIUM 40 MG/1
80 TABLET, FILM COATED ORAL DAILY
Status: DISCONTINUED | OUTPATIENT
Start: 2025-02-21 | End: 2025-02-22 | Stop reason: HOSPADM

## 2025-02-20 RX ORDER — PREDNISONE 5 MG/1
5 TABLET ORAL DAILY
Status: DISCONTINUED | OUTPATIENT
Start: 2025-02-21 | End: 2025-02-22 | Stop reason: HOSPADM

## 2025-02-20 RX ORDER — POLYETHYLENE GLYCOL 3350 17 G/17G
17 POWDER, FOR SOLUTION ORAL DAILY PRN
Status: DISCONTINUED | OUTPATIENT
Start: 2025-02-20 | End: 2025-02-22 | Stop reason: HOSPADM

## 2025-02-20 RX ORDER — IBUPROFEN 200 MG
16 TABLET ORAL
Status: DISCONTINUED | OUTPATIENT
Start: 2025-02-20 | End: 2025-02-22 | Stop reason: HOSPADM

## 2025-02-20 RX ORDER — HYDRALAZINE HYDROCHLORIDE 50 MG/1
100 TABLET, FILM COATED ORAL EVERY 8 HOURS
Status: DISCONTINUED | OUTPATIENT
Start: 2025-02-20 | End: 2025-02-22

## 2025-02-20 RX ORDER — GABAPENTIN 100 MG/1
100 CAPSULE ORAL
Status: DISCONTINUED | OUTPATIENT
Start: 2025-02-21 | End: 2025-02-22 | Stop reason: HOSPADM

## 2025-02-20 RX ORDER — GABAPENTIN 300 MG/1
300 CAPSULE ORAL NIGHTLY
Status: DISCONTINUED | OUTPATIENT
Start: 2025-02-21 | End: 2025-02-22 | Stop reason: HOSPADM

## 2025-02-20 RX ORDER — ACETAMINOPHEN 500 MG
1000 TABLET ORAL EVERY 8 HOURS PRN
Status: DISCONTINUED | OUTPATIENT
Start: 2025-02-20 | End: 2025-02-22 | Stop reason: HOSPADM

## 2025-02-20 RX ORDER — INSULIN ASPART 100 [IU]/ML
0-5 INJECTION, SOLUTION INTRAVENOUS; SUBCUTANEOUS
Status: DISCONTINUED | OUTPATIENT
Start: 2025-02-20 | End: 2025-02-22 | Stop reason: HOSPADM

## 2025-02-20 RX ORDER — MYCOPHENOLATE MOFETIL 250 MG/1
500 CAPSULE ORAL 2 TIMES DAILY
Status: DISCONTINUED | OUTPATIENT
Start: 2025-02-20 | End: 2025-02-22 | Stop reason: HOSPADM

## 2025-02-20 RX ORDER — ALUMINUM HYDROXIDE, MAGNESIUM HYDROXIDE, AND SIMETHICONE 1200; 120; 1200 MG/30ML; MG/30ML; MG/30ML
30 SUSPENSION ORAL 4 TIMES DAILY PRN
Status: DISCONTINUED | OUTPATIENT
Start: 2025-02-20 | End: 2025-02-22 | Stop reason: HOSPADM

## 2025-02-20 RX ORDER — ASPIRIN 325 MG
325 TABLET ORAL
Status: COMPLETED | OUTPATIENT
Start: 2025-02-20 | End: 2025-02-20

## 2025-02-20 RX ORDER — SODIUM CHLORIDE 0.9 % (FLUSH) 0.9 %
5 SYRINGE (ML) INJECTION
Status: DISCONTINUED | OUTPATIENT
Start: 2025-02-20 | End: 2025-02-22 | Stop reason: HOSPADM

## 2025-02-20 RX ORDER — GLUCAGON 1 MG
1 KIT INJECTION
Status: DISCONTINUED | OUTPATIENT
Start: 2025-02-20 | End: 2025-02-22 | Stop reason: HOSPADM

## 2025-02-20 RX ORDER — ONDANSETRON 4 MG/1
4 TABLET, ORALLY DISINTEGRATING ORAL EVERY 6 HOURS PRN
Status: DISCONTINUED | OUTPATIENT
Start: 2025-02-20 | End: 2025-02-22 | Stop reason: HOSPADM

## 2025-02-20 RX ORDER — NALOXONE HCL 0.4 MG/ML
0.02 VIAL (ML) INJECTION
Status: DISCONTINUED | OUTPATIENT
Start: 2025-02-20 | End: 2025-02-22 | Stop reason: HOSPADM

## 2025-02-20 RX ORDER — DOXAZOSIN 2 MG/1
2 TABLET ORAL DAILY
Status: DISCONTINUED | OUTPATIENT
Start: 2025-02-21 | End: 2025-02-22 | Stop reason: HOSPADM

## 2025-02-20 RX ADMIN — RIVAROXABAN 15 MG: 15 TABLET, FILM COATED ORAL at 11:02

## 2025-02-20 RX ADMIN — MYCOPHENOLATE MOFETIL 500 MG: 250 CAPSULE ORAL at 11:02

## 2025-02-20 RX ADMIN — ASPIRIN 325 MG ORAL TABLET 325 MG: 325 PILL ORAL at 09:02

## 2025-02-20 RX ADMIN — SODIUM CHLORIDE 500 ML: 9 INJECTION, SOLUTION INTRAVENOUS at 09:02

## 2025-02-20 RX ADMIN — POTASSIUM BICARBONATE 40 MEQ: 391 TABLET, EFFERVESCENT ORAL at 09:02

## 2025-02-20 RX ADMIN — POTASSIUM CHLORIDE 40 MEQ: 1500 TABLET, EXTENDED RELEASE ORAL at 11:02

## 2025-02-20 RX ADMIN — HYDRALAZINE HYDROCHLORIDE 100 MG: 50 TABLET ORAL at 10:02

## 2025-02-21 ENCOUNTER — CLINICAL SUPPORT (OUTPATIENT)
Dept: CARDIOLOGY | Facility: HOSPITAL | Age: 70
DRG: 308 | End: 2025-02-21
Attending: STUDENT IN AN ORGANIZED HEALTH CARE EDUCATION/TRAINING PROGRAM
Payer: MEDICARE

## 2025-02-21 PROBLEM — R79.89 ELEVATED TROPONIN: Status: RESOLVED | Noted: 2022-07-10 | Resolved: 2025-02-21

## 2025-02-21 PROBLEM — E87.6 HYPOKALEMIA: Status: RESOLVED | Noted: 2025-02-20 | Resolved: 2025-02-21

## 2025-02-21 PROBLEM — D63.8 ANEMIA OF CHRONIC DISEASE: Status: ACTIVE | Noted: 2024-06-26

## 2025-02-21 PROBLEM — D69.6 THROMBOCYTOPENIA: Status: ACTIVE | Noted: 2025-02-21

## 2025-02-21 LAB
ALBUMIN SERPL BCP-MCNC: 3.1 G/DL (ref 3.5–5.2)
ALP SERPL-CCNC: 43 U/L (ref 40–150)
ALT SERPL W/O P-5'-P-CCNC: <5 U/L (ref 10–44)
ANION GAP SERPL CALC-SCNC: 11 MMOL/L (ref 8–16)
ASCENDING AORTA: 3.49 CM
AST SERPL-CCNC: 16 U/L (ref 10–40)
AV AREA BY CONTINUOUS VTI: 4.5 CM2
AV INDEX (PROSTH): 0.85
AV LVOT MEAN GRADIENT: 2 MMHG
AV LVOT PEAK GRADIENT: 4 MMHG
AV MEAN GRADIENT: 3 MMHG
AV PEAK GRADIENT: 6 MMHG
AV VALVE AREA BY VELOCITY RATIO: 4.4 CM²
AV VALVE AREA: 4.5 CM2
AV VELOCITY RATIO: 0.83
BILIRUB SERPL-MCNC: 0.3 MG/DL (ref 0.1–1)
BSA FOR ECHO PROCEDURE: 2.15 M2
BUN SERPL-MCNC: 78 MG/DL (ref 8–23)
CALCIUM SERPL-MCNC: 9.3 MG/DL (ref 8.7–10.5)
CHLORIDE SERPL-SCNC: 94 MMOL/L (ref 95–110)
CO2 SERPL-SCNC: 26 MMOL/L (ref 23–29)
CREAT SERPL-MCNC: 2.6 MG/DL (ref 0.5–1.4)
CV ECHO LV RWT: 0.5 CM
DOP CALC AO PEAK VEL: 1.2 M/S
DOP CALC AO VTI: 23.8 CM
DOP CALC LVOT AREA: 5.3 CM2
DOP CALC LVOT DIAMETER: 2.6 CM
DOP CALC LVOT PEAK VEL: 1 M/S
DOP CALC LVOT STROKE VOLUME: 107.2 CM3
DOP CALCLVOT PEAK VEL VTI: 20.2 CM
E WAVE DECELERATION TIME: 219 MS
E/A RATIO: 2.17
E/E' RATIO: 13 M/S
ECHO EF ESTIMATED: 41 %
ECHO LV POSTERIOR WALL: 1.4 CM (ref 0.6–1.1)
EJECTION FRACTION: 53 %
ERYTHROCYTE [DISTWIDTH] IN BLOOD BY AUTOMATED COUNT: 16.4 % (ref 11.5–14.5)
EST. GFR  (NO RACE VARIABLE): 26.2 ML/MIN/1.73 M^2
FRACTIONAL SHORTENING: 28.6 % (ref 28–44)
GLUCOSE SERPL-MCNC: 207 MG/DL (ref 70–110)
HCT VFR BLD AUTO: 32.1 % (ref 40–54)
HGB BLD-MCNC: 9.8 G/DL (ref 14–18)
INTERVENTRICULAR SEPTUM: 1.2 CM (ref 0.6–1.1)
IVC DIAMETER: 1.17 CM
IVRT: 146 MS
LA MAJOR: 6.5 CM
LA MINOR: 6.6 CM
LA WIDTH: 5 CM
LEFT ATRIUM SIZE: 5 CM
LEFT ATRIUM VOLUME INDEX MOD: 43 ML/M2
LEFT ATRIUM VOLUME INDEX: 65 ML/M2
LEFT ATRIUM VOLUME MOD: 93 ML
LEFT ATRIUM VOLUME: 139 CM3
LEFT INTERNAL DIMENSION IN SYSTOLE: 4 CM (ref 2.1–4)
LEFT VENTRICLE DIASTOLIC VOLUME INDEX: 55.27 ML/M2
LEFT VENTRICLE DIASTOLIC VOLUME: 118.27 ML
LEFT VENTRICLE MASS INDEX: 146.4 G/M2
LEFT VENTRICLE SYSTOLIC VOLUME INDEX: 32.6 ML/M2
LEFT VENTRICLE SYSTOLIC VOLUME: 69.74 ML
LEFT VENTRICULAR INTERNAL DIMENSION IN DIASTOLE: 5.6 CM (ref 3.5–6)
LEFT VENTRICULAR MASS: 313.2 G
LV LATERAL E/E' RATIO: 9.5 M/S
LV SEPTAL E/E' RATIO: 19 M/S
MAGNESIUM SERPL-MCNC: 2.5 MG/DL (ref 1.6–2.6)
MCH RBC QN AUTO: 23.4 PG (ref 27–31)
MCHC RBC AUTO-ENTMCNC: 30.5 G/DL (ref 32–36)
MCV RBC AUTO: 77 FL (ref 82–98)
MV PEAK A VEL: 0.35 M/S
MV PEAK E VEL: 0.76 M/S
OHS CV RV/LV RATIO: 0.68 CM
OHS QRS DURATION: 106 MS
OHS QTC CALCULATION: 384 MS
PHOSPHATE SERPL-MCNC: 3.9 MG/DL (ref 2.7–4.5)
PISA TR MAX VEL: 2.6 M/S
PLATELET # BLD AUTO: 122 K/UL (ref 150–450)
PMV BLD AUTO: ABNORMAL FL (ref 9.2–12.9)
POCT GLUCOSE: 177 MG/DL (ref 70–110)
POCT GLUCOSE: 178 MG/DL (ref 70–110)
POCT GLUCOSE: 263 MG/DL (ref 70–110)
POCT GLUCOSE: 293 MG/DL (ref 70–110)
POTASSIUM SERPL-SCNC: 4 MMOL/L (ref 3.5–5.1)
PROT SERPL-MCNC: 5.7 G/DL (ref 6–8.4)
PULM VEIN S/D RATIO: 0.29
PV PEAK D VEL: 0.73 M/S
PV PEAK S VEL: 0.21 M/S
RA MAJOR: 5.33 CM
RA PRESSURE ESTIMATED: 3 MMHG
RA WIDTH: 4.74 CM
RBC # BLD AUTO: 4.18 M/UL (ref 4.6–6.2)
RIGHT ATRIAL AREA: 20.4 CM2
RIGHT VENTRICLE DIASTOLIC BASEL DIMENSION: 3.8 CM
RV TB RVSP: 6 MMHG
RV TISSUE DOPPLER FREE WALL SYSTOLIC VELOCITY 1 (APICAL 4 CHAMBER VIEW): 8.9 CM/S
SINUS: 3.58 CM
SODIUM SERPL-SCNC: 131 MMOL/L (ref 136–145)
STJ: 2.86 CM
TACROLIMUS BLD-MCNC: 3.9 NG/ML (ref 5–15)
TDI LATERAL: 0.08 M/S
TDI SEPTAL: 0.04 M/S
TDI: 0.06 M/S
TR MAX PG: 27 MMHG
TRICUSPID ANNULAR PLANE SYSTOLIC EXCURSION: 0.89 CM
TV PEAK GRADIENT: 27 MMHG
TV REST PULMONARY ARTERY PRESSURE: 30 MMHG
WBC # BLD AUTO: 4.6 K/UL (ref 3.9–12.7)
Z-SCORE OF LEFT VENTRICULAR DIMENSION IN END DIASTOLE: -2.06
Z-SCORE OF LEFT VENTRICULAR DIMENSION IN END SYSTOLE: -0.38

## 2025-02-21 PROCEDURE — 36415 COLL VENOUS BLD VENIPUNCTURE: CPT | Mod: HCNC | Performed by: FAMILY MEDICINE

## 2025-02-21 PROCEDURE — 99499 UNLISTED E&M SERVICE: CPT | Mod: HCNC,,, | Performed by: INTERNAL MEDICINE

## 2025-02-21 PROCEDURE — 80053 COMPREHEN METABOLIC PANEL: CPT | Mod: HCNC | Performed by: FAMILY MEDICINE

## 2025-02-21 PROCEDURE — 83735 ASSAY OF MAGNESIUM: CPT | Mod: HCNC | Performed by: FAMILY MEDICINE

## 2025-02-21 PROCEDURE — 20600001 HC STEP DOWN PRIVATE ROOM: Mod: HCNC

## 2025-02-21 PROCEDURE — 93270 REMOTE 30 DAY ECG REV/REPORT: CPT | Mod: HCNC

## 2025-02-21 PROCEDURE — 96372 THER/PROPH/DIAG INJ SC/IM: CPT | Performed by: FAMILY MEDICINE

## 2025-02-21 PROCEDURE — 25000003 PHARM REV CODE 250: Mod: HCNC | Performed by: FAMILY MEDICINE

## 2025-02-21 PROCEDURE — 80197 ASSAY OF TACROLIMUS: CPT | Mod: HCNC | Performed by: FAMILY MEDICINE

## 2025-02-21 PROCEDURE — 85027 COMPLETE CBC AUTOMATED: CPT | Mod: HCNC | Performed by: FAMILY MEDICINE

## 2025-02-21 PROCEDURE — 94761 N-INVAS EAR/PLS OXIMETRY MLT: CPT | Mod: HCNC

## 2025-02-21 PROCEDURE — 27000207 HC ISOLATION: Mod: HCNC

## 2025-02-21 PROCEDURE — 84484 ASSAY OF TROPONIN QUANT: CPT | Mod: HCNC | Performed by: FAMILY MEDICINE

## 2025-02-21 PROCEDURE — 63600175 PHARM REV CODE 636 W HCPCS: Mod: HCNC | Performed by: FAMILY MEDICINE

## 2025-02-21 PROCEDURE — 84100 ASSAY OF PHOSPHORUS: CPT | Mod: HCNC | Performed by: FAMILY MEDICINE

## 2025-02-21 RX ORDER — POTASSIUM CHLORIDE 7.45 MG/ML
10 INJECTION INTRAVENOUS
Status: DISCONTINUED | OUTPATIENT
Start: 2025-02-21 | End: 2025-02-21

## 2025-02-21 RX ORDER — TACROLIMUS 0.5 MG/1
0.5 CAPSULE ORAL EVERY MORNING
Status: DISCONTINUED | OUTPATIENT
Start: 2025-02-22 | End: 2025-02-22 | Stop reason: HOSPADM

## 2025-02-21 RX ADMIN — MYCOPHENOLATE MOFETIL 500 MG: 250 CAPSULE ORAL at 08:02

## 2025-02-21 RX ADMIN — MYCOPHENOLATE MOFETIL 500 MG: 250 CAPSULE ORAL at 09:02

## 2025-02-21 RX ADMIN — GABAPENTIN 100 MG: 100 CAPSULE ORAL at 07:02

## 2025-02-21 RX ADMIN — RIVAROXABAN 15 MG: 15 TABLET, FILM COATED ORAL at 05:02

## 2025-02-21 RX ADMIN — PREDNISONE 5 MG: 5 TABLET ORAL at 09:02

## 2025-02-21 RX ADMIN — GABAPENTIN 300 MG: 300 CAPSULE ORAL at 08:02

## 2025-02-21 RX ADMIN — INSULIN GLARGINE 10 UNITS: 100 INJECTION, SOLUTION SUBCUTANEOUS at 09:02

## 2025-02-21 RX ADMIN — HYDRALAZINE HYDROCHLORIDE 100 MG: 50 TABLET ORAL at 09:02

## 2025-02-21 RX ADMIN — ATORVASTATIN CALCIUM 80 MG: 40 TABLET, FILM COATED ORAL at 09:02

## 2025-02-21 RX ADMIN — HYDRALAZINE HYDROCHLORIDE 100 MG: 50 TABLET ORAL at 07:02

## 2025-02-21 RX ADMIN — HYDRALAZINE HYDROCHLORIDE 100 MG: 50 TABLET ORAL at 02:02

## 2025-02-21 RX ADMIN — DOXAZOSIN 2 MG: 2 TABLET ORAL at 09:02

## 2025-02-21 RX ADMIN — GABAPENTIN 100 MG: 100 CAPSULE ORAL at 11:02

## 2025-02-21 NOTE — PLAN OF CARE
[facility] - Cardiology Stepdown  Initial Discharge Assessment       Primary Care Provider: Mima Mack MD    Admission Diagnosis: Bradycardia [R00.1]  Syncope [R55]  Chest pain [R07.9]    Admission Date: 2/20/2025  Expected Discharge Date: 2/23/2025    Transition of Care Barriers: (P) None    Payor: HU[employer] MANAGED MEDICARE / Plan: HU[employer] MEDICARE HMO / Product Type: Capitation /     Extended Emergency Contact Information  Primary Emergency Contact: Erika Zamorano  Address: [address] [place] [address] [facility]  Home Phone: [phone]  Mobile Phone: [phone]  Relation: Spouse    Discharge Plan A: (P) Home with family  Discharge Plan B: (P) Home      [facility]  [address] [facility] [address]  Phone: [phone] Fax: [phone]    "[employer] #[id number] [address] AT [address] [address]  Phone: [phone] Fax: [phone]    CM met with the pt at bedside. Pt answered all dc questions and given dc pamphlet. Family will provide transportation home when discharged. DC plan A is home. DC plan B is home.     Discharge Plan A and Plan B have been determined by review of patient's clinical status, future medical and therapeutic needs, and coverage/benefits for post-acute care in coordination with multidisciplinary team members.     Initial Assessment (most recent)       Adult Discharge Assessment - 02/21/25 1254          Discharge Assessment    Assessment Type Discharge Planning Assessment (P)      Confirmed/corrected address, phone number and insurance Yes (P)      Confirmed Demographics Correct on Facesheet (P)      Source of Information patient (P)      When was your last doctors appointment? -- (P)    unsure    Does patient/caregiver understand observation status Yes (P)      Communicated UBALDO with patient/caregiver Yes (P)      Reason For  Admission syncope (P)      People in Home spouse (P)      Facility Arrived From: home (P)      Do you expect to return to your current living situation? Yes (P)      Do you have help at home or someone to help you manage your care at home? Yes (P)      Who are your caregiver(s) and their phone number(s)? Erika Zamorano (Spouse)  [phone] (P)      Prior to hospitilization cognitive status: Alert/Oriented (P)      Current cognitive status: Alert/Oriented (P)      Walking or Climbing Stairs Difficulty yes (P)      Walking or Climbing Stairs ambulation difficulty, requires equipment;stair climbing difficulty, requires equipment;transferring difficulty, requires equipment (P)      Mobility Management cane (P)      Dressing/Bathing Difficulty no (P)      Home Accessibility wheelchair accessible (P)      Equipment Currently Used at Home cane, straight (P)      Readmission within 30 days? No (P)      Patient currently being followed by outpatient case management? No (P)      Do you currently have service(s) that help you manage your care at home? No (P)      Do you take prescription medications? Yes (P)      Do you have prescription coverage? Yes (P)      Coverage HU[employer] MANAGED MEDICARE - HU[employer]NA MEDICARE HMO - CAPITATED (P)      Do you have any problems affording any of your prescribed medications? No (P)      Is the patient taking medications as prescribed? yes (P)      Who is going to help you get home at discharge? Erika Zamorano (Spouse)  [phone] (P)      How do you get to doctors appointments? car, drives self;family or friend will provide (P)      Are you on dialysis? No (P)      Do you take coumadin? No (P)      Discharge Plan A Home with family (P)      Discharge Plan B Home (P)      DME Needed Upon Discharge  none (P)      Discharge Plan discussed with: Patient (P)      Name(s) and Number(s) Erika Zamorano (Spouse)  [phone] (P)      Transition of Care Barriers None (P)         Physical Activity     On average, how many days per week do you engage in moderate to strenuous exercise (like a brisk walk)? 0 days (P)      On average, how many minutes do you engage in exercise at this level? 0 min (P)         Financial Resource Strain    How hard is it for you to pay for the very basics like food, housing, medical care, and heating? Not very hard (P)         Housing Stability    In the last 12 months, was there a time when you were not able to pay the mortgage or rent on time? No (P)      At any time in the past 12 months, were you homeless or living in a shelter (including now)? No (P)         Transportation Needs    Has the lack of transportation kept you from medical appointments, meetings, work or from getting things needed for daily living? No (P)         Food Insecurity    Within the past 12 months, you worried that your food would run out before you got the money to buy more. Never true (P)      Within the past 12 months, the food you bought just didn't last and you didn't have money to get more. Never true (P)         Stress    Do you feel stress - tense, restless, nervous, or anxious, or unable to sleep at night because your mind is troubled all the time - these days? Not at all (P)         Social Isolation    How often do you feel lonely or isolated from those around you?  Never (P)         Alcohol Use    Q1: How often do you have a drink containing alcohol? 4 or more times a week (P)      Q2: How many drinks containing alcohol do you have on a typical day when you are drinking? 1 or 2 (P)      Q3: How often do you have six or more drinks on one occasion? Never (P)         Utilities    In the past 12 months has the electric, gas, oil, or water company threatened to shut off services in your home? No (P)         Health Literacy    How often do you need to have someone help you when you read instructions, pamphlets, or other written material from your doctor or pharmacy? Sometimes (P)         OTHER     Name(s) of People in Home Erika Zamorano (Spouse)  [phone] (P)         Transportation Needs    In the past 12 months, has lack of transportation kept you from medical appointments or from getting medications? No (P)      In the past 12 months, has lack of transportation kept you from meetings, work, or from getting things needed for daily living? No (P)         Social Connections    In a typical week, how many times do you talk on the phone with family, friends, or neighbors? Never (P)      How often do you get together with friends or relatives? Never (P)      How often do you attend Congregational or Latter-day services? Never (P)      Do you belong to any clubs or organizations such as Congregational groups, unions, fraternal or athletic groups, or school groups? No (P)      How often do you attend meetings of the clubs or organizations you belong to? Never (P)      Are you , , , , never , or living with a partner?  (P)                    Erma Maxwell, MSN  RN Case Management  [phone]

## 2025-02-21 NOTE — ASSESSMENT & PLAN NOTE
Bradycardia  Atrial flutter/AFib  Bradycardic down to the 20s reported by EMS.  No reports of atropine administration.  Recent cardioversion 02/03/2025 with Dr. Bowden.  HR 54 on admission, now stable 60-70s.  Orthostasis possibly contributing to syncopal event with PHUONG on CKD, hyponatremia, hypokalemia while on HCTZ, Lasix, Jardiance.  CT head no acute intracranial process  Echo significant for Grade III Diastolic dysfunction with EF of 50-55%     Plan   Continue home Xarelto  Continue to monitor on telemetry  Holding home Coreg per EP recs   Electrophysiology consulted and following   Monitor lytes, maintain K > 4 and Mag > 2  Pacer Pads in place

## 2025-02-21 NOTE — ASSESSMENT & PLAN NOTE
Holding HCTZ, Lasix, valsartan d/t PHUONG.  Holding Coreg d/t bradycardia.  Increased doxazosin to 2 mg q.d., continue home hydralazine 100 mg q.8 hours.  Allergy to CCB

## 2025-02-21 NOTE — ASSESSMENT & PLAN NOTE
The likely etiology of thrombocytopenia is bone marrow suppression due to immunosuppresion . The patients 3 most recent labs are listed below.  Recent Labs     02/20/25  1927 02/21/25  0458   * 122*     Plan  - Will transfuse if platelet count is <10k.  -

## 2025-02-21 NOTE — ASSESSMENT & PLAN NOTE
No signs of acute exacerbation.  BNP chronically elevated.  Holding Coreg for bradycardia  Holding valsartan and diuretics for PHUONG on CKD  Monitor clinically for volume overload  Resume GDMT with improved kidney function and if no concerns for continued bradycardia.

## 2025-02-21 NOTE — ASSESSMENT & PLAN NOTE
Possibly associated with over-diuresis and hypokalemia  Hold diuretics, s/p IVF in ED  Replace K/Mag  Cautious PO intake monitoring closely for fluid overload.

## 2025-02-21 NOTE — PROGRESS NOTES
[facility] - Cardiology [facility] Medicine  Progress Note    Patient Name: Ravi Zamorano  MRN: [id number]  Patient Class: IP- Inpatient   Admission Date: 2/20/2025  Length of Stay: 0 days  Attending Physician: Feliciano Malhotra MD  Primary Care Provider: Mima Mack MD        Subjective     Principal Problem:Syncope        HPI:  70 yo M with extensive PMH as below, notably CKD 3-4, history of kidney transplant, chronic combined CHF (FELICIANO 2/3/25 EF 40-45%, TTE 12/8/24 G3DD), CVA right MCA 12/24 with minimal residual LLE and left hand weakness, DM2 A1c 8.6 on 01/27, atrial fib/flutter s/p DCCV 02/03/2025 who presents to the ED with chief complaint of 3 syncopal episodes.  Pt unaware of these episodes and denies any prodrome.  Wife at bedside says that at approx 5:30-6 p.m. day of admission, pt was sitting down talking to his wife when suddenly his head lowered, eyes closed, and pt was unresponsive for 20-30 seconds.  No convulsions, no bowel or bladder incontinence, no tongue biting.  Pt denies prior dizziness, change to vision, diaphoresis, palpitations.  Wife does report significant diaphoresis noted after 1st syncopal event.  Wife says she called EMS during 1st episode.  Pt was awake for a few seconds and had another very similar episode.  Pt recovered after 20-30 seconds and seemed fine.  He stood up and went to the restroom with no problems.  EMS arrived and ultimately, pt had a 3rd episode.  According to ED physician:  EMS reported witnessed pink frothy sputum, intense sweating, dilated pupils, and bradycardia to the 20s.  Transcutaneous paced by EMS at a rate of 60.  Responsive within 25 seconds .  Pt says that he does remember vomiting in the ambulance but denies any associated abdominal pain or nausea.  He also reports intermittent palpitations since cardioversion on 02/03.  Currently, pt feels at his usual state of health and denies any headache, change to vision, cough, fever, chills,  chest pain, palpitations, abdominal pain, nausea, vomiting, diarrhea, constipation, blood in stool or urine, pain with urination, or lower extremity pain or swelling.    Overview/Hospital Course:  2/21: EP cards evaluated. Plan to hold all BB and discharge with 30 day event monitor. Recs 24 hours of inpatient cardiac monitoring.     Interval History: No acute events overnight. Pt appears in good spirits this AM. Denies chest pain, dyspnea, or lightheadedness. Wishes to eat but is currently NPO     Review of Systems   Constitutional:  Negative for chills, fatigue and fever.   HENT:  Negative for congestion, rhinorrhea, sinus pressure, sinus pain, tinnitus, trouble swallowing and voice change.    Eyes:  Negative for pain and visual disturbance.   Respiratory:  Negative for cough and shortness of breath.    Cardiovascular:  Negative for chest pain.   Gastrointestinal:  Negative for abdominal pain, constipation, diarrhea, nausea and vomiting.   Genitourinary:  Negative for difficulty urinating.   Neurological:  Negative for dizziness, light-headedness and headaches.   Psychiatric/Behavioral:  Negative for agitation and behavioral problems.      Objective:     Vital Signs (Most Recent):  Temp: 97.6 °F (36.4 °C) (02/21/25 1525)  Pulse: 68 (02/21/25 1525)  Resp: 18 (02/21/25 1525)  BP: 136/69 (02/21/25 1525)  SpO2: 98 % (02/21/25 1525) Vital Signs (24h Range):  Temp:  [96.5 °F (35.8 °C)-98.4 °F (36.9 °C)] 97.6 °F (36.4 °C)  Pulse:  [54-77] 68  Resp:  [15-22] 18  SpO2:  [98 %-100 %] 98 %  BP: (121-220)/() 136/69     Weight: 90 kg (198 lb 6.6 oz)  Body mass index is 26.3 kg/m².  No intake or output data in the 24 hours ending 02/21/25 1529      Physical Exam  Vitals and nursing note reviewed.   Constitutional:       General: He is not in acute distress.  HENT:      Head: Normocephalic and atraumatic.      Nose: Nose normal.      Mouth/Throat:      Mouth: Mucous membranes are moist.   Eyes:      General: No scleral  icterus.     Extraocular Movements: Extraocular movements intact.   Cardiovascular:      Rate and Rhythm: Normal rate. Rhythm irregular.      Heart sounds: No murmur heard.     No friction rub. No gallop.   Pulmonary:      Effort: Pulmonary effort is normal. No respiratory distress.      Breath sounds: No wheezing, rhonchi or rales.   Abdominal:      General: There is no distension.      Palpations: Abdomen is soft.      Tenderness: There is no abdominal tenderness. There is no guarding or rebound.   Musculoskeletal:         General: Normal range of motion.      Cervical back: Neck supple. No rigidity.   Skin:     General: Skin is warm and dry.      Capillary Refill: Capillary refill takes less than 2 seconds.   Neurological:      General: No focal deficit present.      Mental Status: He is alert.   Psychiatric:         Mood and Affect: Mood normal.             Significant Labs: All pertinent labs within the past 24 hours have been reviewed.    Significant Imaging: I have reviewed all pertinent imaging results/findings within the past 24 hours.    Assessment and Plan     * Syncope  Bradycardia  Atrial flutter/AFib  Bradycardic down to the 20s reported by EMS.  No reports of atropine administration.  Recent cardioversion 02/03/2025 with Dr. Bowden.  HR 54 on admission, now stable 60-70s.  Orthostasis possibly contributing to syncopal event with PHUONG on CKD, hyponatremia, hypokalemia while on HCTZ, Lasix, Jardiance.  CT head no acute intracranial process  Echo significant for Grade III Diastolic dysfunction with EF of 50-55%     Plan   Continue home Xarelto  Continue to monitor on telemetry  Holding home Coreg per EP recs   Electrophysiology consulted and following   Monitor lytes, maintain K > 4 and Mag > 2  Pacer Pads in place     Thrombocytopenia  The likely etiology of thrombocytopenia is bone marrow suppression due to immunosuppresion . The patients 3 most recent labs are listed below.  Recent Labs      25  1927 25  0458   * 122*     Plan  - Will transfuse if platelet count is <10k.  -      Metabolic acidosis  Possibly associated with over-diuresis and hypokalemia  Hold diuretics, s/p IVF in ED  Replace K/Mag  Cautious PO intake monitoring closely for fluid overload.    Hyponatremia  Possibly associated with over-diuresis.      Plan   Hold Lasix and HCTZ.  Mild and asymptomatic, expect to improve with PO fluids, s/p  mL.  If no improvement, consider urine/serum Osm and urine Na.    Acute kidney injury superimposed on chronic kidney disease  History of transplanted kidney  Cr 3.0 on admission, baseline 2-2.5 (since 2024)  Possibly over-diuresis, s/p 500 mL IVF in ED    Plan   Holding HCTZ, Lasix, Jardiance, valsartan  Continue MMF, tacrolimus, and prednisone  If no improvement, consider urine studies and renal U/S.  KTM consulted    Status post -donor kidney transplantation  Complicates overall status.     Persistent atrial fibrillation  Patient has long standing persistent (>12 months) atrial fibrillation. Patient is currently in atrial fibrillation. [id number] Score: 3. The patients heart rate in the last 24 hours is as follows:  Pulse  Min: 54  Max: 77     Antiarrhythmics       Anticoagulants  rivaroxaban tablet 15 mg, With dinner, Oral    Plan  - Replete lytes with a goal of K>4, Mg >2  - Patient is anticoagulated, see medications listed above.  -Hold BB in setting of bradycardia  -EP cards plans outpatient follow up for possible ablation         Type 2 diabetes mellitus  Patient's FSGs are uncontrolled due to hyperglycemia on current medication regimen.  Last A1c reviewed-   Lab Results   Component Value Date    HGBA1C 8.6 (H) 2025     Most recent fingerstick glucose reviewed-   Recent Labs   Lab 25  0640 25  1044 25  1521   POCTGLUCOSE 178* 177* 293*     Current correctional scale  Low  Maintain anti-hyperglycemic dose as follows-   Antihyperglycemics  (From admission, onward)      Start     Stop Route Frequency Ordered    02/21/25 0900  insulin glargine U-100 (Lantus) pen 10 Units         -- SubQ Daily 02/20/25 2248    02/20/25 2347  insulin aspart U-100 pen 0-5 Units         -- SubQ Before meals & nightly PRN 02/20/25 2248          Hold Oral hypoglycemics while patient is in the hospital.     Chronic combined systolic (congestive) and diastolic (congestive) heart failure  No signs of acute exacerbation.  BNP chronically elevated.  Holding Coreg for bradycardia  Holding valsartan and diuretics for PHUONG on CKD  Monitor clinically for volume overload  Resume GDMT with improved kidney function and if no concerns for continued bradycardia.    Uncontrolled hypertension  Holding HCTZ, Lasix, valsartan d/t PHUONG.  Holding Coreg d/t bradycardia.  Increased doxazosin to 2 mg q.d., continue home hydralazine 100 mg q.8 hours.  Allergy to CCB      VTE Risk Mitigation (From admission, onward)           Ordered     rivaroxaban tablet 15 mg  With dinner         02/20/25 2301     Reason for No Pharmacological VTE Prophylaxis  Once        Question:  Reasons:  Answer:  Already adequately anticoagulated on oral Anticoagulants    02/20/25 2248                    Discharge Planning   UBALDO: 2/23/2025     Code Status: Full Code   Medical Readiness for Discharge Date:   Discharge Plan A: Home with family                        Feliciano Malhotra MD  Department of Hospital Medicine   [facility] - Cardiology Stepdown

## 2025-02-21 NOTE — ASSESSMENT & PLAN NOTE
Pt with persistent AF. With multiple FELICIANO/DCCV (8/2019, 5/2023, 2/2025).   - S/p PVI with LA posterior wall isolation, CTI/PWI ablation 6/11/2024 Dr. Meade. HV interval post ablation 53ms.  - Tele: AF rate controlled     Recommendations   - Continue with xarelto 15 mg qd   - STOP coreg 25 mg BID   - Upon discharge; discharge with cardiac 30 day event monitor   - Follow up EP clinic outpt for evaluation of ablation

## 2025-02-21 NOTE — MEDICAL/APP STUDENT
History     Chief Complaint   Patient presents with    Loss of Consciousness     Syncope episode x 3 times, went unconscious, bradycardic in the 30s.       69yoM w/ PMHx HTN, HLD, IDT2DM, CKD2, s/p kidney transplant in 2016 (Cr baseline 1.5), diastolic dysfunction, EF 55% as of 2019, and persistent Afib (s/p ablation 2024, scheduled cardioversion in clinic 2/3/25), presenting to ED via EMS after syncopal event and severe bradycardia at home this afternoon. No fall, no headstrike. Family witnessed 2 back to back episodes of unexplained syncope of 25-30 seconds duration this afternoon at home and called EMS. He appeared to be improving, returned to baseline and EMS was waved off when he had a sudden 3rd episode. Per EMS, witnessed pink frothy sputum, intense sweating, dilated pupils, and bradycardia to the 20s. Transcutaneously paced by EMS at rate of 60. Responsive within 25 seconds. Glucose 230.     A&Ox4 on evaluation in the ED. Afebrile, denies headache, chest pain, shortness of breath, abdominal pain, n/v/d/dysuria. Normal BM and urination. Pacing halted and patient's native rhythm in 70s. States he was at home watching TV when episodes began and felt his normal self. No recent stressors; denies drug or alcohol ingestion today; no recent illness. Eating and drinking well. Family mentions mildly increased abdominal swelling over the last few days, but patient feels this is his baseline. Anticoagulated on Xarelto and aspirin. No implanted devices.       The history is provided by the patient, the EMS personnel, medical records and the spouse.     Past Medical History:   Diagnosis Date    Anticoagulant long-term use     Anxiety 07/29/2014    Arthritis     atrial fibrillation     Bilateral diabetic retinopathy 2017    Cardioembolic stroke 12/7/2024    CKD (chronic kidney disease) stage 2, GFR 60-89 ml/min 12/28/2016    CKD (chronic kidney disease) stage 4, GFR 15-29 ml/min 07/29/2014    Colon polyps 2014     Depression - situational 07/29/2014    Diabetes mellitus     Diabetes type 2 since 2000 07/29/2014    Diabetic neuropathy 07/29/2014    Encounter for blood transfusion     History of cardioversion 10/03/2019    History of hepatitis C, s/p successful Rx w/ SVR24 - 9/2017 07/29/2014    Genotype 1a, treatment naive 10/2014 liver biopsy - grade 1 / stage 1 Completed Harvoni w/ SVR    Hyperlipidemia 07/29/2014    Hypertension     Neuropathy     Organ transplant candidate 07/29/2014    Pancreatitis     S/P cadaveric kidney transplant 11/5/2016. ESRD secondary to HTN/DMII 11/05/2016    Stage 3a chronic kidney disease 12/28/2016    Type 2 diabetes mellitus with stage 3a chronic kidney disease, with long-term current use of insulin 07/29/2014       Past Surgical History:   Procedure Laterality Date    ABLATION OF ARRHYTHMOGENIC FOCUS FOR ATRIAL FIBRILLATION N/A 6/11/2024    Procedure: Ablation atrial fibrillation;  Surgeon: Bornson Bowden MD;  Location: [facility] EP LAB;  Service: Cardiology;  Laterality: N/A;  AF, FELICIANO (cx if SR), PVI, RFA, Carto, Gen, GP, 3 Prep    ABLATION, ATRIAL FLUTTER, TYPICAL  6/11/2024    Procedure: Ablation, Atrial Flutter, Typical;  Surgeon: Bronson Bowden MD;  Location: [facility] EP LAB;  Service: Cardiology;;    APPENDECTOMY      BONE MARROW BIOPSY Left 6/26/2024    Procedure: Biopsy-bone marrow;  Surgeon: Bridger Zapata MD;  Location: [facility] ENDO ([facility] FLR);  Service: Oncology;  Laterality: Left;  6/24-pt knows to hold aspirin and eliquis as instructed by hem/onc, precall complete-Kpvt    CARDIOVERSION  6/11/2024    Procedure: Cardioversion;  Surgeon: Bronson Bowedn MD;  Location: [facility] EP LAB;  Service: Cardiology;;    CATARACT EXTRACTION W/  INTRAOCULAR LENS IMPLANT Right 3/13/2024    Procedure: EXTRACTION, CATARACT, WITH IOL INSERTION;  Surgeon: Jennifer Palacio MD;  Location: [facility] OR;  Service: Ophthalmology;  Laterality: Right;    CATARACT EXTRACTION W/  INTRAOCULAR LENS IMPLANT Left  4/10/2024    Procedure: EXTRACTION, CATARACT, WITH IOL INSERTION;  Surgeon: Jennifer Palacio MD;  Location: [facility] OR;  Service: Ophthalmology;  Laterality: Left;    COLONOSCOPY N/A 12/21/2020    Procedure: COLONOSCOPY;  Surgeon: Tico Bell MD;  Location: [facility] ENDO (4TH FLR);  Service: Endoscopy;  Laterality: N/A;  ok to hold eliquis per Dr. Valadez, see telephone encounter 11/13/2020-MS  covid test 12/18 [place]    ECHOCARDIOGRAM,TRANSESOPHAGEAL N/A 2/3/2025    Procedure: Transesophageal echo (FELICIANO) intra-procedure log documentation;  Surgeon: Grayson Lin III, MD;  Location: [facility] EP LAB;  Service: Cardiology;  Laterality: N/A;    KIDNEY TRANSPLANT      TRANSESOPHAGEAL ECHOCARDIOGRAPHY N/A 8/26/2019    Procedure: ECHOCARDIOGRAM, TRANSESOPHAGEAL;  Surgeon: Dolores Diagnostic Provider;  Location: [facility] EP LAB;  Service: Cardiology;  Laterality: N/A;    TRANSESOPHAGEAL ECHOCARDIOGRAPHY N/A 6/11/2024    Procedure: ECHOCARDIOGRAM, TRANSESOPHAGEAL;  Surgeon: Grayson Lin III, MD;  Location: [facility] EP LAB;  Service: Cardiology;  Laterality: N/A;    TREATMENT OF CARDIAC ARRHYTHMIA N/A 8/26/2019    Procedure: CARDIOVERSION;  Surgeon: Bronson Bowden MD;  Location: [facility] EP LAB;  Service: Cardiology;  Laterality: N/A;  AF, FELICIANO, DCCV, MAC, GP, 3 PREP    TREATMENT OF CARDIAC ARRHYTHMIA N/A 2/3/2025    Procedure: Cardioversion or Defibrillation;  Surgeon: Bronson Bowden MD;  Location: [facility] EP LAB;  Service: Cardiology;  Laterality: N/A;  AF, FELICIANO, DCCV, MAC, GP, 3 Prep       Family History   Problem Relation Name Age of Onset    Diabetes Mother      Hypertension Mother      Heart disease Mother          CAD with PCI    Heart disease Father      Cancer Brother 2         one sister with breast cancer    Hypertension Brother 2         one sister with HTN and borderline DM    Stroke Neg Hx      Kidney disease Neg Hx      Colon cancer Neg Hx      Esophageal cancer Neg Hx         Social History[1]    Review of  Systems    Physical Exam   BP (!) 177/82   Pulse 65   Temp 97.8 °F (36.6 °C) (Oral)   Resp 16   SpO2 100%     Physical Exam    Constitutional: He appears well-developed and well-nourished. He is diaphoretic. No distress.   HENT:   Head: Atraumatic.   Eyes: EOM are normal. Scleral icterus is present.   Neck:   Normal range of motion.  Cardiovascular:  Intact distal pulses.           Transcutaneous pacing ongoing on first exam. Irregular rhythm.     Pacing halted with native HR in 70s,    Pulmonary/Chest: Breath sounds normal. No respiratory distress. He has no rhonchi. He exhibits no tenderness.   Abdominal: Abdomen is soft. Bowel sounds are normal. He exhibits distension. There is no abdominal tenderness. There is no rebound and no guarding.   Musculoskeletal:         General: No edema. Normal range of motion.      Cervical back: Normal range of motion.     Neurological: He is alert and oriented to person, place, and time. He has normal strength. No sensory deficit.   Skin: Skin is warm. No rash noted. No cyanosis or erythema. Nails show clubbing.   Psychiatric: He has a normal mood and affect. His behavior is normal.         ED Course                [1]   Social History  Tobacco Use    Smoking status: Former     Current packs/day: 0.00     Average packs/day: 0.5 packs/day for 32.0 years (16.0 ttl pk-yrs)     Types: Cigarettes     Start date: 1984     Quit date: 2016     Years since quittin.2    Smokeless tobacco: Never    Tobacco comments:     Pt reports that he quit in , but started up again in . pt reports he is currently working on quitting again   Substance Use Topics    Alcohol use: Yes     Comment: Pt reports occasional beers on Sundays. Pt reports drinking daily prior to ESRD diagnosis.    Drug use: No

## 2025-02-21 NOTE — ASSESSMENT & PLAN NOTE
Bradycardia  Atrial flutter/AFib  Bradycardic down to the 20s reported by EMS.  No reports of atropine administration.  Recent cardioversion 02/03/2025 with Dr. Bowden.  HR 54 on admission, now stable 60-70s.  Orthostasis possibly contributing to syncopal event with PHUONG on CKD, hyponatremia, hypokalemia while on HCTZ, Lasix, Jardiance.  CT head no acute intracranial process  Continue home Xarelto  Monitor on telemetry  Hold home Coreg for now.  Orthostatic vital signs pending  Echo pending  Electrophysiology consulted  Monitor lytes, maintain K > 4 and Mag > 2

## 2025-02-21 NOTE — ASSESSMENT & PLAN NOTE
Pt with Hx of persistent AF on coreg 25 mg BID. S/p FELICIANO/DCCV 2/3/25. Reported episode of syncope x3 prompting EMS call. EMS reported witnessed pink frothy sputum, intense sweating, dilated pupils, and bradycardia to the 20s. Transcutaneous paced by EMS at a rate of 60.    Tele reviewed: AF with slow ventricular rate.    Recommendations  - Syncope with notable bradycardia likely iatrogenic, from BB  - Hold all BB for now   - Continue with tele monitor  - Will consider 30 days cardiac event monitor prior to discharge

## 2025-02-21 NOTE — SUBJECTIVE & OBJECTIVE
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    CKD (chronic kidney disease) stage 4, GFR 15-29 ml/min 07/29/2014    Colon polyps 2014    Depression - situational 07/29/2014    Diabetes type 2 since 2000 07/29/2014    Diabetic neuropathy 07/29/2014    Encounter for blood transfusion     History of cardioversion 10/03/2019    History of hepatitis C, s/p successful Rx w/ SVR24 - 9/2017 07/29/2014    Genotype 1a, treatment naive 10/2014 liver biopsy - grade 1 / stage 1 Completed Harvoni w/ SVR    Hyperlipidemia 07/29/2014    Hypertension     Neuropathy     Organ transplant candidate 07/29/2014    Pancreatitis     S/P cadaveric kidney transplant 11/5/2016. ESRD secondary to HTN/DMII 11/05/2016    Type 2 diabetes mellitus with stage 3a chronic kidney disease, with long-term current use of insulin 07/29/2014       Past Surgical History:   Procedure Laterality Date    ABLATION OF ARRHYTHMOGENIC FOCUS FOR ATRIAL FIBRILLATION N/A 6/11/2024    Procedure: Ablation atrial fibrillation;  Surgeon: Bronson Bowden MD;  Location: [facility] EP LAB;  Service: Cardiology;  Laterality: N/A;  AF, FLEICIANO (cx if SR), PVI, RFA, Carto, Gen, GP, 3 Prep    ABLATION, ATRIAL FLUTTER, TYPICAL  6/11/2024    Procedure: Ablation, Atrial Flutter, Typical;  Surgeon: Bronson Bowden MD;  Location: [facility] EP LAB;  Service: Cardiology;;    APPENDECTOMY      BONE MARROW BIOPSY Left 6/26/2024    Procedure: Biopsy-bone marrow;  Surgeon: Bridger Zapata MD;  Location: [facility] ENDO ([address] [place] [address]);  Service: Oncology;  Laterality: Left;  6/24-pt knows to hold aspirin and eliquis as instructed by hem/onc, precall complete-Kpvt    CARDIOVERSION  6/11/2024    Procedure: Cardioversion;  Surgeon: Bronson Bowden MD;  Location: [facility] EP LAB;  Service: Cardiology;;    CATARACT EXTRACTION W/  INTRAOCULAR LENS IMPLANT  Right 3/13/2024    Procedure: EXTRACTION, CATARACT, WITH IOL INSERTION;  Surgeon: Jennifer Palacio MD;  Location: [facility] OR;  Service: Ophthalmology;  Laterality: Right;    CATARACT EXTRACTION W/  INTRAOCULAR LENS IMPLANT Left 4/10/2024    Procedure: EXTRACTION, CATARACT, WITH IOL INSERTION;  Surgeon: Jennifer Palacio MD;  Location: [facility] OR;  Service: Ophthalmology;  Laterality: Left;    COLONOSCOPY N/A 12/21/2020    Procedure: COLONOSCOPY;  Surgeon: Tico Bell MD;  Location: [facility] ENDO (4TH FLR);  Service: Endoscopy;  Laterality: N/A;  ok to hold eliquis per Dr. Valadez, see telephone encounter 11/13/2020-MS  covid test 12/18 [place]    ECHOCARDIOGRAM,TRANSESOPHAGEAL N/A 2/3/2025    Procedure: Transesophageal echo (FELICIANO) intra-procedure log documentation;  Surgeon: Grayson iLn III, MD;  Location: [facility] EP LAB;  Service: Cardiology;  Laterality: N/A;    KIDNEY TRANSPLANT      TRANSESOPHAGEAL ECHOCARDIOGRAPHY N/A 8/26/2019    Procedure: ECHOCARDIOGRAM, TRANSESOPHAGEAL;  Surgeon: [facility];  Location: [facility] EP LAB;  Service: Cardiology;  Laterality: N/A;    TRANSESOPHAGEAL ECHOCARDIOGRAPHY N/A 6/11/2024    Procedure: ECHOCARDIOGRAM, TRANSESOPHAGEAL;  Surgeon: Grayson Lin III, MD;  Location: [facility] EP LAB;  Service: Cardiology;  Laterality: N/A;    TREATMENT OF CARDIAC ARRHYTHMIA N/A 8/26/2019    Procedure: CARDIOVERSION;  Surgeon: Bronson Bowden MD;  Location: [facility] EP LAB;  Service: Cardiology;  Laterality: N/A;  AF, FELICIANO, DCCV, MAC, GP, 3 PREP    TREATMENT OF CARDIAC ARRHYTHMIA N/A 2/3/2025    Procedure: Cardioversion or Defibrillation;  Surgeon: Bronson Bowden MD;  Location: [facility] EP LAB;  Service: Cardiology;  Laterality: N/A;  AF, FELICIANO, DCCV, MAC, GP, 3 Prep       Review of patient's allergies indicates:   Allergen Reactions    Nifedipine Other (See Comments)     Gingival hyperplasia       Current Facility-Administered Medications on File Prior to Encounter   Medication    balanced salt  irrigation intra-ocular solution 1 drop    phenylephrine HCL 10% ophthalmic solution 1 drop    proparacaine 0.5 % ophthalmic solution 1 drop    sodium chloride 0.9% flush 10 mL    TETRAcaine HCl (PF) 0.5 % Drop 1 drop     Current Outpatient Medications on File Prior to Encounter   Medication Sig    atorvastatin (LIPITOR) 80 MG tablet Take 1 tablet (80 mg total) by mouth once daily.    blood sugar diagnostic Strp Use to check blood glucose 1 times daily, to use with insurance preferred meter    blood-glucose meter Misc Use to check blood glucose 1 times daily, to use with insurance preferred meter    blood-glucose sensor Kayla Gin 3, use as directed, change every 14 days , e 11.65    carvediloL (COREG) 25 MG tablet Take 1 tablet (25 mg total) by mouth 2 (two) times daily.    doxazosin (CARDURA) 1 MG tablet Take 1 tablet (1 mg total) by mouth every evening.    empagliflozin (JARDIANCE) 10 mg tablet Take 1 tablet (10 mg total) by mouth once daily.    ergocalciferol (ERGOCALCIFEROL) 50,000 unit Cap Take 1 capsule (50,000 Units total) by mouth every 7 days.    furosemide (LASIX) 40 MG tablet Take 2 tablets (80 mg total) by mouth 2 (two) times a day.    gabapentin (NEURONTIN) 300 MG capsule Take 1 capsule by mouth in the morning, 1 capsule midday, and 3 capsules at night.    glucagon (GVOKE HYPOPEN 2-PACK) 1 mg/0.2 mL AtIn Inject 0.2 mLs into the skin as needed (severe hypoglycemia).    hydrALAZINE (APRESOLINE) 100 MG tablet Take 1 tablet (100 mg total) by mouth every 8 (eight) hours.    hydroCHLOROthiazide (HYDRODIURIL) 12.5 MG Tab Take 1 tablet (12.5 mg total) by mouth once daily.    insulin aspart U-100 (NOVOLOG U-100 INSULIN ASPART) 100 unit/mL injection Use in pump, max daily 100 units.    insulin aspart U-100 (NOVOLOG) 100 unit/mL (3 mL) InPn pen Inject 10 units under the skin w/ breakfast, 7 units at lunch and dinner. Scale 180-230+2, 231-280+4, 281-330+6, 331-380+8, >380+10.  Max daily 57 units.    insulin  "glargine U-100, Lantus, (LANTUS SOLOSTAR U-100 INSULIN) 100 unit/mL (3 mL) InPn pen Inject 20 Units into the skin every morning.    insulin  cart,aut,G6/7,cntr (OMNIPOD 5 G6-G7 INTRO KT,GEN5,) Crtg Use as directed.    insulin pump cart,auto,BT,G6/7 (OMNIPOD 5 G6-G7 PODS, GEN 5,) Crtg Change every 48 hours.    lancets 30 gauge Misc Use to check blood glucose 1 times daily, to use with insurance preferred meter    magnesium oxide (MAG-OX) 400 mg (241.3 mg magnesium) tablet Take 1 tablet (400 mg total) by mouth 2 (two) times daily.    meclizine (ANTIVERT) 25 mg tablet Take 1 tablet (25 mg total) by mouth 3 (three) times daily as needed for Dizziness.    mycophenolate (CELLCEPT) 250 mg Cap Take 2 capsules (500 mg total) by mouth 2 (two) times daily.    pen needle, diabetic (BD ULTRA-FINE LO PEN NEEDLE) 32 gauge x 5/32" Ndle USE TO ADMINISTER INSULIN 4 TIMES DAILY.    predniSONE (DELTASONE) 5 MG tablet Take 1 tablet (5 mg total) by mouth once daily.    rivaroxaban (XARELTO) 15 mg Tab Take 1 tablet (15 mg total) by mouth daily with dinner or evening meal.    tacrolimus (PROGRAF) 0.5 MG Cap Take 1 capsule (0.5 mg total) by mouth once daily. Start tomorrow    valsartan (DIOVAN) 320 MG tablet Take 1 tablet (320 mg total) by mouth once daily.    [DISCONTINUED] insulin lispro (HUMALOG KWIKPEN INSULIN) 100 unit/mL pen Inject 18 units w/ breakfast, 16 units w/ lunch and dinner plus scale 150-200 +2, 201-250 +4, 251-300 +6, 301-350 +8.     Family History       Problem Relation (Age of Onset)    Cancer Brother    Diabetes Mother    Heart disease Mother, Father    Hypertension Mother, Brother          Tobacco Use    Smoking status: Former     Current packs/day: 0.00     Average packs/day: 0.5 packs/day for 32.0 years (16.0 ttl pk-yrs)     Types: Cigarettes     Start date: 1984     Quit date: 2016     Years since quittin.2    Smokeless tobacco: Never   Substance and Sexual Activity    Alcohol use: Yes     " Comment: Pt reports occasional beers on Sundays. Pt reports drinking daily prior to ESRD diagnosis.    Drug use: No    Sexual activity: Yes     Partners: Female     Review of Systems   Constitutional: Negative.   Cardiovascular:  Positive for irregular heartbeat.   Respiratory: Negative.     Neurological: Negative.      Objective:     Vital Signs (Most Recent):  Temp: 97.8 °F (36.6 °C) (02/21/25 1048)  Pulse: 61 (02/21/25 1048)  Resp: 18 (02/21/25 1048)  BP: (!) 153/74 (02/21/25 1048)  SpO2: 98 % (02/21/25 1048) Vital Signs (24h Range):  Temp:  [96.5 °F (35.8 °C)-98.4 °F (36.9 °C)] 97.8 °F (36.6 °C)  Pulse:  [54-77] 61  Resp:  [15-22] 18  SpO2:  [98 %-100 %] 98 %  BP: (121-220)/() 153/74       Weight: 90 kg (198 lb 6.6 oz)  Body mass index is 26.3 kg/m².    SpO2: 98 %        Physical Exam  Constitutional:       Appearance: Normal appearance.   HENT:      Head: Atraumatic.   Cardiovascular:      Rate and Rhythm: Rhythm irregular.      Heart sounds: Normal heart sounds.   Pulmonary:      Effort: Pulmonary effort is normal.      Breath sounds: Normal breath sounds.   Skin:     General: Skin is warm.      Capillary Refill: Capillary refill takes less than 2 seconds.   Neurological:      Mental Status: He is alert and oriented to person, place, and time.   Psychiatric:         Mood and Affect: Mood normal.         Behavior: Behavior normal.            Significant Labs:   Recent Labs   Lab 02/20/25  1036 02/20/25 1927 02/21/25 0458   WBC  --  5.58 4.60   HGB 10.7* 10.1* 9.8*   HCT 37.6* 33.6* 32.1*   PLT  --  133* 122*       Recent Labs   Lab 02/20/25 1927 02/21/25 0458   * 131*   K 3.0* 4.0   CL 95 94*   CO2 18* 26   BUN 83* 78*   CREATININE 3.0* 2.6*   CALCIUM 9.5 9.3   PHOS  --  3.9       Recent Labs   Lab 02/20/25 1927 02/21/25  0458   ALKPHOS 45 43   BILITOT 0.3 0.3   PROT 6.6 5.7*   ALT <5* <5*   AST 14 16     Lab Results   Component Value Date    CHOL 172 12/07/2024    HDL 30 (L) 12/07/2024     LDLCALC 105.4 12/07/2024    TRIG 183 (H) 12/07/2024     Lab Results   Component Value Date    HGBA1C 8.6 (H) 01/27/2025       Lab Results   Component Value Date    BNP 1,055 (H) 02/20/2025     (H) 06/25/2024    BNP 3,203 (H) 06/15/2024         Significant Imaging:   ECHO 2/20/25     Left Ventricle: There is mild concentric hypertrophy. There is low normal systolic function with a visually estimated ejection fraction of 50 - 55%. Grade III diastolic dysfunction.    Left Ventricle: The left ventricle is at the upper limits of normal in size. Mildly increased wall thickness. There is mild concentric hypertrophy. Regional wall motion abnormalities present. See diagram for wall motion findings. There is low normal systolic function with a visually estimated ejection fraction of 50 - 55%. Ejection fraction is approximately 53%. Grade III diastolic dysfunction.    Right Ventricle: Systolic function is borderline low despite the low TAPSE.    Left Atrium: Left atrium is moderately dilated.    Aortic Valve: The aortic valve is a trileaflet valve. There is mild aortic valve sclerosis. There is moderate annular calcification present. There is mild aortic regurgitation with a centrally directed jet.    Mitral Valve: There is mild regurgitation with a centrally directed jet.    Pericardium: There is a trivial posterior effusion and under the RA.    Tricuspid Valve: There is mild regurgitation.    Pulmonary Artery: The estimated pulmonary artery systolic pressure is 30 mmHg.

## 2025-02-21 NOTE — ASSESSMENT & PLAN NOTE
Patient has long standing persistent (>12 months) atrial fibrillation. Patient is currently in atrial fibrillation. [id number] Score: 3. The patients heart rate in the last 24 hours is as follows:  Pulse  Min: 54  Max: 77     Antiarrhythmics       Anticoagulants  rivaroxaban tablet 15 mg, With dinner, Oral    Plan  - Replete lytes with a goal of K>4, Mg >2  - Patient is anticoagulated, see medications listed above.  -Hold BB in setting of bradycardia  -EP cards plans outpatient follow up for possible ablation

## 2025-02-21 NOTE — ED TRIAGE NOTES
Arrives EMS from home. 2 syncopal episodes at home back to back approximately 1 hour ago. Lasting 30 seconds each. Denies NVD, chest pain. . Abdominal distention noted. Upon EMS arrival patient had syncopal episode, HR 28 with diaphoresis and pink frothy sputum. Arrives being paced at 60bpm; power 75. AAOx4. Recent cardioversion for a-fib on 02/03

## 2025-02-21 NOTE — SUBJECTIVE & OBJECTIVE
Interval History: No acute events overnight. Pt appears in good spirits this AM. Denies chest pain, dyspnea, or lightheadedness. Wishes to eat but is currently NPO     Review of Systems   Constitutional:  Negative for chills, fatigue and fever.   HENT:  Negative for congestion, rhinorrhea, sinus pressure, sinus pain, tinnitus, trouble swallowing and voice change.    Eyes:  Negative for pain and visual disturbance.   Respiratory:  Negative for cough and shortness of breath.    Cardiovascular:  Negative for chest pain.   Gastrointestinal:  Negative for abdominal pain, constipation, diarrhea, nausea and vomiting.   Genitourinary:  Negative for difficulty urinating.   Neurological:  Negative for dizziness, light-headedness and headaches.   Psychiatric/Behavioral:  Negative for agitation and behavioral problems.      Objective:     Vital Signs (Most Recent):  Temp: 97.6 °F (36.4 °C) (02/21/25 1525)  Pulse: 68 (02/21/25 1525)  Resp: 18 (02/21/25 1525)  BP: 136/69 (02/21/25 1525)  SpO2: 98 % (02/21/25 1525) Vital Signs (24h Range):  Temp:  [96.5 °F (35.8 °C)-98.4 °F (36.9 °C)] 97.6 °F (36.4 °C)  Pulse:  [54-77] 68  Resp:  [15-22] 18  SpO2:  [98 %-100 %] 98 %  BP: (121-220)/() 136/69     Weight: 90 kg (198 lb 6.6 oz)  Body mass index is 26.3 kg/m².  No intake or output data in the 24 hours ending 02/21/25 1529      Physical Exam  Vitals and nursing note reviewed.   Constitutional:       General: He is not in acute distress.  HENT:      Head: Normocephalic and atraumatic.      Nose: Nose normal.      Mouth/Throat:      Mouth: Mucous membranes are moist.   Eyes:      General: No scleral icterus.     Extraocular Movements: Extraocular movements intact.   Cardiovascular:      Rate and Rhythm: Normal rate. Rhythm irregular.      Heart sounds: No murmur heard.     No friction rub. No gallop.   Pulmonary:      Effort: Pulmonary effort is normal. No respiratory distress.      Breath sounds: No wheezing, rhonchi or rales.    Abdominal:      General: There is no distension.      Palpations: Abdomen is soft.      Tenderness: There is no abdominal tenderness. There is no guarding or rebound.   Musculoskeletal:         General: Normal range of motion.      Cervical back: Neck supple. No rigidity.   Skin:     General: Skin is warm and dry.      Capillary Refill: Capillary refill takes less than 2 seconds.   Neurological:      General: No focal deficit present.      Mental Status: He is alert.   Psychiatric:         Mood and Affect: Mood normal.             Significant Labs: All pertinent labs within the past 24 hours have been reviewed.    Significant Imaging: I have reviewed all pertinent imaging results/findings within the past 24 hours.

## 2025-02-21 NOTE — H&P
[facility] - Emergency Dept  [facility] Medicine  History & Physical    Patient Name: Ravi Zamorano  MRN: [id number]  Patient Class: OP- Observation  Admission Date: 2/20/2025  Attending Physician: Feliciano Malhotra MD   Primary Care Provider: Mima Mack MD         Patient information was obtained from patient, spouse/SO, past medical records, and ER records.     Subjective:     Principal Problem:Syncope      Chief Complaint   Patient presents with    Loss of Consciousness     Syncope episode x 3 times, went unconscious, bradycardic in the 20s per EMS.          HPI: 68 yo M with extensive PMH as below, notably CKD 3-4, history of kidney transplant, chronic combined CHF (FELICIANO 2/3/25 EF 40-45%, TTE 12/8/24 G3DD), CVA right MCA 12/24 with minimal residual LLE and left hand weakness, DM2 A1c 8.6 on 01/27, atrial fib/flutter s/p DCCV 02/03/2025 who presents to the ED with chief complaint of 3 syncopal episodes.  Pt unaware of these episodes and denies any prodrome.  Wife at bedside says that at approx 5:30-6 p.m. day of admission, pt was sitting down talking to his wife when suddenly his head lowered, eyes closed, and pt was unresponsive for 20-30 seconds.  No convulsions, no bowel or bladder incontinence, no tongue biting.  Pt denies prior dizziness, change to vision, diaphoresis, palpitations.  Wife does report significant diaphoresis noted after 1st syncopal event.  Wife says she called EMS during 1st episode.  Pt was awake for a few seconds and had another very similar episode.  Pt recovered after 20-30 seconds and seemed fine.  He stood up and went to the restroom with no problems.  EMS arrived and ultimately, pt had a 3rd episode.  According to ED physician:  EMS reported witnessed pink frothy sputum, intense sweating, dilated pupils, and bradycardia to the 20s.  Transcutaneous paced by EMS at a rate of 60.  Responsive within 25 seconds .  Pt says that he does remember vomiting in the ambulance but denies  any associated abdominal pain or nausea.  He also reports intermittent palpitations since cardioversion on 02/03.  Currently, pt feels at his usual state of health and denies any headache, change to vision, cough, fever, chills, chest pain, palpitations, abdominal pain, nausea, vomiting, diarrhea, constipation, blood in stool or urine, pain with urination, or lower extremity pain or swelling.    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    CKD (chronic kidney disease) stage 4, GFR 15-29 ml/min 07/29/2014    Colon polyps 2014    Depression - situational 07/29/2014    Diabetes type 2 since 2000 07/29/2014    Diabetic neuropathy 07/29/2014    Encounter for blood transfusion     History of cardioversion 10/03/2019    History of hepatitis C, s/p successful Rx w/ SVR24 - 9/2017 07/29/2014    Genotype 1a, treatment naive 10/2014 liver biopsy - grade 1 / stage 1 Completed Harvoni w/ SVR    Hyperlipidemia 07/29/2014    Hypertension     Neuropathy     Organ transplant candidate 07/29/2014    Pancreatitis     S/P cadaveric kidney transplant 11/5/2016. ESRD secondary to HTN/DMII 11/05/2016    Type 2 diabetes mellitus with stage 3a chronic kidney disease, with long-term current use of insulin 07/29/2014       Past Surgical History:   Procedure Laterality Date    ABLATION OF ARRHYTHMOGENIC FOCUS FOR ATRIAL FIBRILLATION N/A 6/11/2024    Procedure: Ablation atrial fibrillation;  Surgeon: Bronson Bowden MD;  Location: [facility] EP LAB;  Service: Cardiology;  Laterality: N/A;  AF, FELICIANO (cx if SR), PVI, RFA, Carto, Gen, GP, 3 Prep    ABLATION, ATRIAL FLUTTER, TYPICAL  6/11/2024    Procedure: Ablation, Atrial Flutter, Typical;  Surgeon: Bronson Bowden MD;  Location: [facility] EP LAB;  Service: Cardiology;;    APPENDECTOMY      BONE MARROW BIOPSY Left 6/26/2024    Procedure:  Biopsy-bone marrow;  Surgeon: Bridger Zapata MD;  Location: [facility] ENDO (4TH FLR);  Service: Oncology;  Laterality: Left;  6/24-pt knows to hold aspirin and eliquis as instructed by hem/onc, precall complete-Kpvt    CARDIOVERSION  6/11/2024    Procedure: Cardioversion;  Surgeon: Bronson Bowden MD;  Location: [facility] EP LAB;  Service: Cardiology;;    CATARACT EXTRACTION W/  INTRAOCULAR LENS IMPLANT Right 3/13/2024    Procedure: EXTRACTION, CATARACT, WITH IOL INSERTION;  Surgeon: Jennifer Palacio MD;  Location: [facility] OR;  Service: Ophthalmology;  Laterality: Right;    CATARACT EXTRACTION W/  INTRAOCULAR LENS IMPLANT Left 4/10/2024    Procedure: EXTRACTION, CATARACT, WITH IOL INSERTION;  Surgeon: Jennifer Palacio MD;  Location: [facility] OR;  Service: Ophthalmology;  Laterality: Left;    COLONOSCOPY N/A 12/21/2020    Procedure: COLONOSCOPY;  Surgeon: Tico Bell MD;  Location: [facility] ENDO (4TH FLR);  Service: Endoscopy;  Laterality: N/A;  ok to hold eliquis per Dr. Valadez, see telephone encounter 11/13/2020-MS  covid test 12/18 [facility]    ECHOCARDIOGRAM,TRANSESOPHAGEAL N/A 2/3/2025    Procedure: Transesophageal echo (FELICIANO) intra-procedure log documentation;  Surgeon: Grayson Lin III, MD;  Location: [facility] EP LAB;  Service: Cardiology;  Laterality: N/A;    KIDNEY TRANSPLANT      TRANSESOPHAGEAL ECHOCARDIOGRAPHY N/A 8/26/2019    Procedure: ECHOCARDIOGRAM, TRANSESOPHAGEAL;  Surgeon: [facility] Provider;  Location: [facility] EP LAB;  Service: Cardiology;  Laterality: N/A;    TRANSESOPHAGEAL ECHOCARDIOGRAPHY N/A 6/11/2024    Procedure: ECHOCARDIOGRAM, TRANSESOPHAGEAL;  Surgeon: Grayson Lin III, MD;  Location: [facility] EP LAB;  Service: Cardiology;  Laterality: N/A;    TREATMENT OF CARDIAC ARRHYTHMIA N/A 8/26/2019    Procedure: CARDIOVERSION;  Surgeon: Bronson Bowden MD;  Location: [facility] EP LAB;  Service: Cardiology;  Laterality: N/A;  AF, FELICIANO, DCCV, MAC, GP, 3 PREP    TREATMENT OF CARDIAC ARRHYTHMIA N/A 2/3/2025     Procedure: Cardioversion or Defibrillation;  Surgeon: Bronson Bowden MD;  Location: [facility] EP LAB;  Service: Cardiology;  Laterality: N/A;  AF, FELICIANO, DCCV, MAC, GP, 3 Prep       Review of patient's allergies indicates:   Allergen Reactions    Nifedipine Other (See Comments)     Gingival hyperplasia       Current Facility-Administered Medications on File Prior to Encounter   Medication    balanced salt irrigation intra-ocular solution 1 drop    phenylephrine HCL 10% ophthalmic solution 1 drop    proparacaine 0.5 % ophthalmic solution 1 drop    sodium chloride 0.9% flush 10 mL    TETRAcaine HCl (PF) 0.5 % Drop 1 drop     Current Outpatient Medications on File Prior to Encounter   Medication Sig    atorvastatin (LIPITOR) 80 MG tablet Take 1 tablet (80 mg total) by mouth once daily.    blood sugar diagnostic Strp Use to check blood glucose 1 times daily, to use with insurance preferred meter    blood-glucose meter Misc Use to check blood glucose 1 times daily, to use with insurance preferred meter    blood-glucose sensor Kayla Gin 3, use as directed, change every 14 days , e 11.65    carvediloL (COREG) 25 MG tablet Take 1 tablet (25 mg total) by mouth 2 (two) times daily.    doxazosin (CARDURA) 1 MG tablet Take 1 tablet (1 mg total) by mouth every evening.    empagliflozin (JARDIANCE) 10 mg tablet Take 1 tablet (10 mg total) by mouth once daily.    ergocalciferol (ERGOCALCIFEROL) 50,000 unit Cap Take 1 capsule (50,000 Units total) by mouth every 7 days.    furosemide (LASIX) 40 MG tablet Take 2 tablets (80 mg total) by mouth 2 (two) times a day.    gabapentin (NEURONTIN) 300 MG capsule Take 1 capsule by mouth in the morning, 1 capsule midday, and 3 capsules at night.    glucagon (GVOKE HYPOPEN 2-PACK) 1 mg/0.2 mL AtIn Inject 0.2 mLs into the skin as needed (severe hypoglycemia).    hydrALAZINE (APRESOLINE) 100 MG tablet Take 1 tablet (100 mg total) by mouth every 8 (eight) hours.    hydroCHLOROthiazide (HYDRODIURIL)  "12.5 MG Tab Take 1 tablet (12.5 mg total) by mouth once daily.    insulin aspart U-100 (NOVOLOG U-100 INSULIN ASPART) 100 unit/mL injection Use in pump, max daily 100 units.    insulin aspart U-100 (NOVOLOG) 100 unit/mL (3 mL) InPn pen Inject 10 units under the skin w/ breakfast, 7 units at lunch and dinner. Scale 180-230+2, 231-280+4, 281-330+6, 331-380+8, >380+10.  Max daily 57 units.    insulin glargine U-100, Lantus, (LANTUS SOLOSTAR U-100 INSULIN) 100 unit/mL (3 mL) InPn pen Inject 20 Units into the skin every morning.    insulin  cart,aut,G6/7,cntr (OMNIPOD 5 G6-G7 INTRO KT,GEN5,) Crtg Use as directed.    insulin pump cart,auto,BT,G6/7 (OMNIPOD 5 G6-G7 PODS, GEN 5,) Crtg Change every 48 hours.    lancets 30 gauge Misc Use to check blood glucose 1 times daily, to use with insurance preferred meter    magnesium oxide (MAG-OX) 400 mg (241.3 mg magnesium) tablet Take 1 tablet (400 mg total) by mouth 2 (two) times daily.    meclizine (ANTIVERT) 25 mg tablet Take 1 tablet (25 mg total) by mouth 3 (three) times daily as needed for Dizziness.    mycophenolate (CELLCEPT) 250 mg Cap Take 2 capsules (500 mg total) by mouth 2 (two) times daily.    pen needle, diabetic (BD ULTRA-FINE LO PEN NEEDLE) 32 gauge x 5/32" Ndle USE TO ADMINISTER INSULIN 4 TIMES DAILY.    predniSONE (DELTASONE) 5 MG tablet Take 1 tablet (5 mg total) by mouth once daily.    rivaroxaban (XARELTO) 15 mg Tab Take 1 tablet (15 mg total) by mouth daily with dinner or evening meal.    tacrolimus (PROGRAF) 0.5 MG Cap Take 1 capsule (0.5 mg total) by mouth once daily. Start tomorrow    valsartan (DIOVAN) 320 MG tablet Take 1 tablet (320 mg total) by mouth once daily.    [DISCONTINUED] insulin lispro (HUMALOG KWIKPEN INSULIN) 100 unit/mL pen Inject 18 units w/ breakfast, 16 units w/ lunch and dinner plus scale 150-200 +2, 201-250 +4, 251-300 +6, 301-350 +8.     Family History       Problem Relation (Age of Onset)    Cancer Brother    Diabetes Mother    " Heart disease Mother, Father    Hypertension Mother, Brother          Tobacco Use    Smoking status: Former     Current packs/day: 0.00     Average packs/day: 0.5 packs/day for 32.0 years (16.0 ttl pk-yrs)     Types: Cigarettes     Start date: 1984     Quit date: 2016     Years since quittin.2    Smokeless tobacco: Never   Substance and Sexual Activity    Alcohol use: Yes     Comment: Pt reports occasional beers on Sundays. Pt reports drinking daily prior to ESRD diagnosis.    Drug use: No    Sexual activity: Yes     Partners: Female     Review of Systems: See HPI  Objective:     Vital Signs (Most Recent):  Temp: 98.4 °F (36.9 °C) (25)  Pulse: 69 (25)  Resp: 18 (25)  BP: (!) 166/77 (25)  SpO2: 98 % (25) Vital Signs (24h Range):  Temp:  [97.7 °F (36.5 °C)-98.4 °F (36.9 °C)] 98.4 °F (36.9 °C)  Pulse:  [54-77] 69  Resp:  [15-22] 18  SpO2:  [98 %-100 %] 98 %  BP: (121-220)/() 166/77     Weight: 90.7 kg (199 lb 15.3 oz)  Body mass index is 26.38 kg/m².     Physical Exam  Vitals reviewed.   Constitutional:       General: He is not in acute distress.     Appearance: He is not toxic-appearing.   HENT:      Mouth/Throat:      Mouth: Mucous membranes are moist.      Comments: Poor dentition  Eyes:      Extraocular Movements: Extraocular movements intact.      Pupils: Pupils are equal, round, and reactive to light.   Cardiovascular:      Rate and Rhythm: Normal rate and regular rhythm.      Heart sounds: Normal heart sounds. No murmur heard.  Pulmonary:      Effort: Pulmonary effort is normal. No respiratory distress.      Breath sounds: Normal breath sounds.   Abdominal:      General: Bowel sounds are normal. There is no distension.      Palpations: Abdomen is soft.      Tenderness: There is no abdominal tenderness.   Musculoskeletal:      Right lower leg: No edema.      Left lower leg: No edema.   Skin:     General: Skin is warm and dry.    Neurological:      General: No focal deficit present.      Mental Status: He is alert and oriented to person, place, and time.      Cranial Nerves: No cranial nerve deficit.   Psychiatric:         Mood and Affect: Mood normal.         Behavior: Behavior normal.              CRANIAL NERVES     CN III, IV, VI   Pupils are equal, round, and reactive to light.       Significant Labs personally reviewed:  WBC 5.58, Hgb 10.1, Na 129, K 3.0, CO2 18, BUN 83, Cr 3.0, troponin 170 from 102    CXR personally reviewed:  No obvious consolidations, edema, or effusion    Assessment/Plan:     * Syncope  Bradycardia  Atrial flutter/AFib  Bradycardic down to the 20s reported by EMS.  No reports of atropine administration.  Recent cardioversion 02/03/2025 with Dr. Bowden.  HR 54 on admission, now stable 60-70s.  Orthostasis possibly contributing to syncopal event with PHUONG on CKD, hyponatremia, hypokalemia while on HCTZ, Lasix, Jardiance.  CT head no acute intracranial process  Continue home Xarelto  Monitor on telemetry  Hold home Coreg for now.  Orthostatic vital signs pending  Echo pending  Electrophysiology consulted  Monitor lytes, maintain K > 4 and Mag > 2    Acute kidney injury superimposed on chronic kidney disease  History of transplanted kidney  Cr 3.0 on admission, baseline 2-2.5 (since 07/2024)  Possibly over-diuresis, s/p 500 mL IVF in ED  Pedialyte 400 mL ordered x1  Holding HCTZ, Lasix, Jardiance, valsartan  Start home tacrolimus pending level  Continue MMF and prednisone  If no improvement, consider urine studies and renal U/S.  KTM consulted    Hypokalemia  Possibly associated with Lasix and HCTZ, hold.  Monitor K/Mag and replace as needed    Hyponatremia  Possibly associated with over-diuresis.    Hold Lasix and HCTZ.  Mild and asymptomatic, expect to improve with PO fluids, s/p  mL.  If no improvement, consider urine/serum Osm and urine Na.    Metabolic acidosis  Possibly associated with over-diuresis and  hypokalemia  Hold diuretics, s/p IVF in ED  Replace K/Mag  Cautious PO intake monitoring closely for fluid overload.    Elevated troponin  Chronically elevated, possibly associated with PHUONG/CKD and bradycardia.  No chest pain  Trend trops to plateau  Echo pending  Stat EKG for new chest pain    Chronic combined systolic (congestive) and diastolic (congestive) heart failure  No signs of acute exacerbation.  BNP chronically elevated.  Holding Coreg for bradycardia  Holding valsartan and diuretics for PHUONG on CKD  Monitor clinically for volume overload  Resume GDMT with improved kidney function and if no concerns for continued bradycardia.    Type 2 diabetes mellitus  A1c 8.6 on 01/27/2025  Home regimen: Lantus 20 units q.d., NovoLog SSI  Started Lantus 10 units q.d., low-dose SSI, titrate as needed    Uncontrolled hypertension  Holding HCTZ, Lasix, valsartan d/t PHUONG.  Holding Coreg d/t bradycardia.  Increased doxazosin to 2 mg q.d., continue home hydralazine 100 mg q.8 hours.  Allergy to CCB      VTE Risk Mitigation (From admission, onward)           Ordered     rivaroxaban tablet 15 mg  With dinner         02/20/25 2301     Reason for No Pharmacological VTE Prophylaxis  Once        Question:  Reasons:  Answer:  Already adequately anticoagulated on oral Anticoagulants    02/20/25 2248                       On 02/20/2025, patient should be placed in hospital observation services under my care.             Johny Barahona III, MD  Department of Hospital Medicine  [facility] - Emergency Dept

## 2025-02-21 NOTE — PLAN OF CARE
Inpatient Upgrade Note    Ravi Zamorano has warranted treatment spanning two or more midnights of hospital level care for the management of  Syncope, Bradycardia, PHUONG . He continues to require daily labs, further testing/imaging, monitoring of vital signs, medication adjustments, and further evaluation by consultants. His condition is also complicated by the following comorbidities:  kidney transplant, chronic combined CHF (FELICIANO 2/3/25 EF 40-45%, TTE 12/8/24 G3DD), CVA right MCA 12/24 with minimal residual LLE and left hand weakness, DM2 A1c 8.6 on 01/27, atrial fib/flutter s/p DCCV 02/03/2025  .

## 2025-02-21 NOTE — ASSESSMENT & PLAN NOTE
A1c 8.6 on 01/27/2025  Home regimen: Lantus 20 units q.d., NovoLog SSI  Started Lantus 10 units q.d., low-dose SSI, titrate as needed

## 2025-02-21 NOTE — ED PROVIDER NOTES
Encounter Date: 2/20/2025       History     Chief Complaint   Patient presents with    Loss of Consciousness     Syncope episode x 3 times, went unconscious, bradycardic in the 30s.       The history is provided by the patient, the EMS personnel and medical records.     Patient is a 69yoM w/ PMHx HTN, HLD, IDT2DM, CKD2, s/p kidney transplant in 2016 (Cr baseline 1.5), diastolic dysfunction, EF 55% as of 2019, and persistent Afib (s/p ablation 2024, scheduled cardioversion in clinic 2/3/25), presenting to ED via EMS after multiple syncopal event and severe bradycardia at home this afternoon. Family witnessed 2 back to back episodes of unexplained syncope of 25-30 seconds duration this afternoon at home and called EMS. He appeared to be improving, returned to baseline and EMS was waved off when he had a sudden 3rd episode. Per EMS, witnessed pink frothy sputum, intense sweating, dilated pupils, and bradycardia to the 20s. Transcutaneously paced by EMS at rate of 60. Responsive within 25 seconds. Glucose 230.  Here in the emergency department, patient states that he feels like his normal self and currently not experiencing any symptoms.  Does not remember details surrounding the syncopal events.    Review of patient's allergies indicates:   Allergen Reactions    Nifedipine Other (See Comments)     Gingival hyperplasia     Past Medical History:   Diagnosis Date    Anticoagulant long-term use     Anxiety 07/29/2014    Arthritis     atrial fibrillation     Bilateral diabetic retinopathy 2017    Cardioembolic stroke 12/7/2024    CKD (chronic kidney disease) stage 2, GFR 60-89 ml/min 12/28/2016    CKD (chronic kidney disease) stage 4, GFR 15-29 ml/min 07/29/2014    Colon polyps 2014    Depression - situational 07/29/2014    Diabetes mellitus     Diabetes type 2 since 2000 07/29/2014    Diabetic neuropathy 07/29/2014    Encounter for blood transfusion     History of cardioversion 10/03/2019    History of hepatitis C, s/p  successful Rx w/ SVR24 - 9/2017 07/29/2014    Genotype 1a, treatment naive 10/2014 liver biopsy - grade 1 / stage 1 Completed Harvoni w/ SVR    Hyperlipidemia 07/29/2014    Hypertension     Neuropathy     Organ transplant candidate 07/29/2014    Pancreatitis     S/P cadaveric kidney transplant 11/5/2016. ESRD secondary to HTN/DMII 11/05/2016    Stage 3a chronic kidney disease 12/28/2016    Type 2 diabetes mellitus with stage 3a chronic kidney disease, with long-term current use of insulin 07/29/2014     Past Surgical History:   Procedure Laterality Date    ABLATION OF ARRHYTHMOGENIC FOCUS FOR ATRIAL FIBRILLATION N/A 6/11/2024    Procedure: Ablation atrial fibrillation;  Surgeon: Bronson Bowden MD;  Location: [facility] EP LAB;  Service: Cardiology;  Laterality: N/A;  AF, FELICIANO (cx if SR), PVI, RFA, Carto, Gen, GP, 3 Prep    ABLATION, ATRIAL FLUTTER, TYPICAL  6/11/2024    Procedure: Ablation, Atrial Flutter, Typical;  Surgeon: Bronson Bowden MD;  Location: [facility] EP LAB;  Service: Cardiology;;    APPENDECTOMY      BONE MARROW BIOPSY Left 6/26/2024    Procedure: Biopsy-bone marrow;  Surgeon: Bridger Zapata MD;  Location: [facility] ENDO (4TH FLR);  Service: Oncology;  Laterality: Left;  6/24-pt knows to hold aspirin and eliquis as instructed by hem/onc, precall complete-Kpvt    CARDIOVERSION  6/11/2024    Procedure: Cardioversion;  Surgeon: Bronson Bowden MD;  Location: [facility] EP LAB;  Service: Cardiology;;    CATARACT EXTRACTION W/  INTRAOCULAR LENS IMPLANT Right 3/13/2024    Procedure: EXTRACTION, CATARACT, WITH IOL INSERTION;  Surgeon: Jennifer Palacio MD;  Location: [facility] OR;  Service: Ophthalmology;  Laterality: Right;    CATARACT EXTRACTION W/  INTRAOCULAR LENS IMPLANT Left 4/10/2024    Procedure: EXTRACTION, CATARACT, WITH IOL INSERTION;  Surgeon: Jennifer Palacio MD;  Location: [facility] OR;  Service: Ophthalmology;  Laterality: Left;    COLONOSCOPY N/A 12/21/2020    Procedure: COLONOSCOPY;  Surgeon: Tico Bell,  MD;  Location: [facility] ENDO (4TH FLR);  Service: Endoscopy;  Laterality: N/A;  ok to hold katyquedith per Dr. Valadez, see telephone encounter 11/13/2020-MS  covid test 12/18 [place]    ECHOCARDIOGRAM,TRANSESOPHAGEAL N/A 2/3/2025    Procedure: Transesophageal echo (FELICIANO) intra-procedure log documentation;  Surgeon: Grayson Lin III, MD;  Location: [facility] EP LAB;  Service: Cardiology;  Laterality: N/A;    KIDNEY TRANSPLANT      TRANSESOPHAGEAL ECHOCARDIOGRAPHY N/A 8/26/2019    Procedure: ECHOCARDIOGRAM, TRANSESOPHAGEAL;  Surgeon: [facility];  Location: [facility] EP LAB;  Service: Cardiology;  Laterality: N/A;    TRANSESOPHAGEAL ECHOCARDIOGRAPHY N/A 6/11/2024    Procedure: ECHOCARDIOGRAM, TRANSESOPHAGEAL;  Surgeon: Grayson Lin III, MD;  Location: [facility] EP LAB;  Service: Cardiology;  Laterality: N/A;    TREATMENT OF CARDIAC ARRHYTHMIA N/A 8/26/2019    Procedure: CARDIOVERSION;  Surgeon: Bronson Bowden MD;  Location: [facility] EP LAB;  Service: Cardiology;  Laterality: N/A;  AF, FELICIANO, DCCV, MAC, GP, 3 PREP    TREATMENT OF CARDIAC ARRHYTHMIA N/A 2/3/2025    Procedure: Cardioversion or Defibrillation;  Surgeon: Bronson Bowden MD;  Location: [facility] EP LAB;  Service: Cardiology;  Laterality: N/A;  AF, FELICIANO, DCCV, MAC, GP, 3 Prep     Family History   Problem Relation Name Age of Onset    Diabetes Mother      Hypertension Mother      Heart disease Mother          CAD with PCI    Heart disease Father      Cancer Brother 2         one sister with breast cancer    Hypertension Brother 2         one sister with HTN and borderline DM    Stroke Neg Hx      Kidney disease Neg Hx      Colon cancer Neg Hx      Esophageal cancer Neg Hx       Social History[1]      Physical Exam     Initial Vitals [02/20/25 1844]   BP Pulse Resp Temp SpO2   121/68 (!) 54 16 97.8 °F (36.6 °C) 99 %      MAP       --         Physical Exam    Nursing note and vitals reviewed.  Constitutional: He appears well-developed. No distress.   Patient comfortably  resting on the bed.  Not in any acute distress.   HENT:   Head: Normocephalic and atraumatic. Mouth/Throat: Oropharynx is clear and moist and mucous membranes are normal.   Eyes: EOM are normal. Pupils are equal, round, and reactive to light.   Neck:   Normal range of motion.  Cardiovascular:  Normal rate and regular rhythm.           Pulmonary/Chest: No respiratory distress.   Abdominal: Abdomen is soft. He exhibits no distension. There is no abdominal tenderness.   Mild fluid wave noted with palpation to the abdomen, however it is nontender There is no rebound and no guarding.   Musculoskeletal:      Cervical back: Normal range of motion.      Comments: 2+ pitting edema noted in the bilateral lower extremities     Neurological: He is alert and oriented to person, place, and time.   Skin: Skin is warm.   Psychiatric: He has a normal mood and affect. His behavior is normal. Thought content normal.         ED Course   Procedures  Labs Reviewed   HEPATITIS C ANTIBODY - Abnormal       Result Value    Hepatitis C Ab Reactive (*)     Narrative:     Release to patient->Immediate   CBC W/ AUTO DIFFERENTIAL - Abnormal    WBC 5.58      RBC 4.36 (*)     Hemoglobin 10.1 (*)     Hematocrit 33.6 (*)     MCV 77 (*)     MCH 23.2 (*)     MCHC 30.1 (*)     RDW 16.6 (*)     Platelets 133 (*)     MPV SEE COMMENT      Immature Granulocytes 0.5      Gran # (ANC) 3.9      Immature Grans (Abs) 0.03      Lymph # 0.4 (*)     Mono # 1.2 (*)     Eos # 0.0      Baso # 0.03      nRBC 0      Gran % 69.3      Lymph % 7.5 (*)     Mono % 21.7 (*)     Eosinophil % 0.5      Basophil % 0.5      Differential Method Automated      Narrative:     Release to patient->Immediate   COMPREHENSIVE METABOLIC PANEL - Abnormal    Sodium 129 (*)     Potassium 3.0 (*)     Chloride 95      CO2 18 (*)     Glucose 159 (*)     BUN 83 (*)     Creatinine 3.0 (*)     Calcium 9.5      Total Protein 6.6      Albumin 3.5      Total Bilirubin 0.3      Alkaline Phosphatase 45       AST 14      ALT <5 (*)     eGFR 22.1 (*)     Anion Gap 16      Narrative:     Release to patient->Immediate   TROPONIN I HIGH SENSITIVITY - Abnormal    Troponin I High Sensitivity 102 (*)     Narrative:     Release to patient->Immediate   TROPONIN I HIGH SENSITIVITY - Abnormal    Troponin I High Sensitivity 170 (*)    B-TYPE NATRIURETIC PEPTIDE - Abnormal    BNP 1,055 (*)     Narrative:     Release to patient->Immediate   HIV 1 / 2 ANTIBODY    HIV 1/2 Ag/Ab Non-reactive      Narrative:     Release to patient->Immediate   MAGNESIUM    Magnesium 2.6      Narrative:     Release to patient->Immediate   TSH    TSH 1.767      Narrative:     Release to patient->Immediate   HEPATITIS C RNA, QUANTITATIVE, PCR   TOXICOLOGY SCREEN, URINE, RANDOM (COMPLIANCE)          Imaging Results              CT Head Without Contrast (Final result)  Result time 02/20/25 22:03:39      Final result by Ranjana Hdez MD (02/20/25 22:03:39)                   Impression:      1. No CT evidence of acute intracranial abnormality. Clinical correlation and further evaluation as warranted.  Specifically, if there is high clinical concern for acute ischemia, further assessment with MRI is advised if there are no clinical contraindications.  2. Generalized cerebral volume loss and findings suggestive of chronic microvascular ischemic change.      Electronically signed by: Ranjana Hdez MD  Date:    02/20/2025  Time:    22:03               Narrative:    EXAMINATION:  CT HEAD WITHOUT CONTRAST    CLINICAL HISTORY:  Syncope, recurrent;Multiple episodes of unexplained syncope;    TECHNIQUE:  Low dose axial images were obtained through the head.  Coronal and sagittal reformations were also performed. Contrast was not administered.    COMPARISON:  Head CT and MRI brain 12/07/2024    FINDINGS:  There is no acute intracranial hemorrhage, hydrocephalus, midline shift or mass effect. There is mild generalized cerebral volume loss.  There is hypoattenuation  within the supratentorial white matter, nonspecific but likely reflecting sequela of chronic microvascular ischemic change.  More focal hypodensity in the right frontal lobe subcortical white matter corresponding to remote infarct seen on prior MRI exam.  The basal cisterns are patent. The visualized paranasal sinuses and mastoid air cells are clear of acute process.  The visualized bones of the calvarium demonstrate no acute osseous abnormality.                                       X-Ray Chest AP Portable (Final result)  Result time 02/20/25 22:17:55      Final result by Neftaly Wood DO (02/20/25 22:17:55)                   Impression:      No acute abnormality.      Electronically signed by: Neftaly Wood  Date:    02/20/2025  Time:    22:17               Narrative:    EXAMINATION:  XR CHEST AP PORTABLE    CLINICAL HISTORY:  Syncope and collapse    TECHNIQUE:  Single frontal view of the chest was performed.    COMPARISON:  12/07/2024.    FINDINGS:  There are overlying defibrillator pads and overlying leads.  The lungs are well expanded and clear. No focal opacities are seen. The pleural spaces are clear. The cardiac silhouette is unremarkable.  There are calcifications of the aortic arch.  The visualized osseous structures are unremarkable.                                       Medications   hydrALAZINE tablet 100 mg (100 mg Oral Given 2/20/25 2238)   sodium chloride 0.9% flush 5 mL (has no administration in time range)   melatonin tablet 6 mg (has no administration in time range)   ondansetron disintegrating tablet 4 mg (has no administration in time range)   prochlorperazine injection Soln 5 mg (has no administration in time range)   polyethylene glycol packet 17 g (has no administration in time range)   simethicone chewable tablet 80 mg (has no administration in time range)   aluminum-magnesium hydroxide-simethicone 200-200-20 mg/5 mL suspension 30 mL (has no administration in time range)   acetaminophen  tablet 650 mg (has no administration in time range)   acetaminophen tablet 1,000 mg (has no administration in time range)   naloxone 0.4 mg/mL injection 0.02 mg (has no administration in time range)   glucose chewable tablet 16 g (has no administration in time range)   glucose chewable tablet 24 g (has no administration in time range)   dextrose 50% injection 12.5 g (has no administration in time range)   dextrose 50% injection 25 g (has no administration in time range)   glucagon (human recombinant) injection 1 mg (has no administration in time range)   insulin aspart U-100 pen 0-5 Units (has no administration in time range)   insulin glargine U-100 (Lantus) pen 10 Units (has no administration in time range)   potassium chloride SA CR tablet 40 mEq (40 mEq Oral Given 2/20/25 2345)   electrolytes-dextrose (Pedialyte) oral solution 400 mL (0 mLs Oral Hold 2/21/25 0000)   atorvastatin tablet 80 mg (has no administration in time range)   doxazosin tablet 1 mg (has no administration in time range)   gabapentin capsule 100 mg (has no administration in time range)     And   gabapentin capsule 100 mg (has no administration in time range)     And   gabapentin capsule 300 mg (has no administration in time range)   mycophenolate capsule 500 mg (500 mg Oral Given 2/20/25 2346)   predniSONE tablet 5 mg (has no administration in time range)   rivaroxaban tablet 15 mg (15 mg Oral Given 2/20/25 2345)   doxazosin tablet 2 mg (has no administration in time range)   aspirin tablet 325 mg (325 mg Oral Given 2/20/25 2104)   potassium bicarbonate disintegrating tablet 40 mEq (40 mEq Oral Given 2/20/25 2104)   sodium chloride 0.9% bolus 500 mL 500 mL (0 mLs Intravenous Stopped 2/20/25 2236)     Medical Decision Making  Patient is a 69-year-old male who presents due to repeated multiple episodes of syncope.  Differential diagnosis includes but is not limited to dysrhythmia, electrolyte abnormality, ACS, CVA, ICH, UTI.  Did to the  transcutaneous pacer belong into the EMS.  He was comfortably resting in the bed and did not appear to be in any acute distress.  I turned off the pacer and he was able to maintain a normal heart rate on his own.  He was completely asymptomatic, however given his cardiac history we will perform a full cardiac workup here in the emergency department.    Troponin noted to be elevated to 102, so we will give him a dose of aspirin.  Potassium noted to be mildly decreased to 3.0, so will replete orally.    He remained asymptomatic here in the emergency department and no longer required any transcutaneous pacing.  Given the syncopal events as well as his past cardiac medical history, we will admit to hospital medicine for an EP evaluation/further observation and workup.    He was continued to be monitored here in the emergency department for a few hours, during which time he did not require any transcutaneous pacing.  Given his significant cardiac history as well as the findings of the significant bradycardia, he will benefit from having a cardiology evaluation.  For this reason, we will admit him to Hospital Medicine.    Amount and/or Complexity of Data Reviewed  External Data Reviewed: notes.  Labs: ordered.  Radiology: ordered.    Risk  OTC drugs.  Prescription drug management.                                      Clinical Impression:  Final diagnoses:  [R00.1] Bradycardia  [R55] Syncope          ED Disposition Condition    Observation                     [1]   Social History  Tobacco Use    Smoking status: Former     Current packs/day: 0.00     Average packs/day: 0.5 packs/day for 32.0 years (16.0 ttl pk-yrs)     Types: Cigarettes     Start date: 1984     Quit date: 2016     Years since quittin.2    Smokeless tobacco: Never    Tobacco comments:     Pt reports that he quit in , but started up again in . pt reports he is currently working on quitting again   Substance Use Topics    Alcohol use:  Yes     Comment: Pt reports occasional beers on Sundays. Pt reports drinking daily prior to ESRD diagnosis.    Drug use: No        Cecil Shaw MD  Resident  02/21/25 0005

## 2025-02-21 NOTE — CONSULTS
[facility] - Cardiology Stepdown  Cardiac Electrophysiology  Consult Note    Admission Date: 2/20/2025  Code Status: Full Code   Attending Provider: Feliciano Malhotra MD  Consulting Provider: Thanh Sanchez MD  Principal Problem:Syncope    Inpatient consult to Electrophysiology  Consult performed by: Thanh Sanchez MD  Consult ordered by: Johny Barahona III, MD        Subjective:     EP consulted for Syncope with bradycardia     HPI:   68 yo M with PMH of CKD 3-4, history of kidney transplant, chronic combined CHF (FELICIANO 2/3/25 EF 40-45%, TTE 12/8/24 G3DD), CVA right MCA 12/24 with minimal residual LLE and left hand weakness, DM2 A1c 8.6 on 01/27, atrial fib/flutter s/p DCCV 02/03/2025 who presents to the ED with chief complaint of 3 syncopal episodes. As per chart review;  Pt unaware of these episodes and denies any prodrome.  Wife at bedside says that at approx 5:30-6 p.m. day of admission, pt was sitting down talking to his wife when suddenly his head lowered, eyes closed, and pt was unresponsive for 20-30 seconds.  No convulsions, no bowel or bladder incontinence, no tongue biting.  Pt denied prior dizziness, change to vision, diaphoresis, palpitations.  Wife does report significant diaphoresis noted after 1st syncopal event.  Wife says she called EMS during 1st episode.  Pt was awake for a few seconds and had another very similar episode.  Pt recovered after 20-30 seconds and seemed fine.  He stood up and went to the restroom with no problems.  EMS arrived and ultimately, pt had a 3rd episode.  According to ED physician:  EMS reported witnessed pink frothy sputum, intense sweating, dilated pupils, and bradycardia to the 20s.  Transcutaneous paced by EMS at a rate of 60.  Responsive within 25 seconds .  Pt says that he does remember vomiting in the ambulance but denies any associated abdominal pain or nausea.     EP consulted for symptomatic bradycardia.     EP background   Patient with Hx of persistent AF,  underwent DCCV and flecainide initiation in 8/2019. Over the past several months he has had recurrent symptomatic AF. He underwent DCCV in 8/2019, and was started on flecainide 100 mg bid post-procedure.  QRS wide on ECG--flecainide reduced to 50 mg bid. By 5/2023 QRS had narrowed, although he had an episode of syncope in 5/2023 thought to be due to hypotension 2/2 chlorthalidone. SPECT 5/15/2023 negative for ischemia or scar. Event monitor 1/2024--one episode of AF noted 12/24/2023, and episodes of SR and junctional rhythm with HR ranging from 54-74. S/p CTI/PVI/LA PWI with Dr. Bowden in June 2024 (PVI with LA posterior wall isolation). 6/11/2024 Dr. Meade. HV interval post ablation 53ms.  Multaq discontinue as pt remained NSR after the procedure. Only on coreg 25 BID. Had another FELICIANO/cardioversion 2/23/25 with plan to redo ablation outpatient.     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    CKD (chronic kidney disease) stage 4, GFR 15-29 ml/min 07/29/2014    Colon polyps 2014    Depression - situational 07/29/2014    Diabetes type 2 since 2000 07/29/2014    Diabetic neuropathy 07/29/2014    Encounter for blood transfusion     History of cardioversion 10/03/2019    History of hepatitis C, s/p successful Rx w/ SVR24 - 9/2017 07/29/2014    Genotype 1a, treatment naive 10/2014 liver biopsy - grade 1 / stage 1 Completed Harvoni w/ SVR    Hyperlipidemia 07/29/2014    Hypertension     Neuropathy     Organ transplant candidate 07/29/2014    Pancreatitis     S/P cadaveric kidney transplant 11/5/2016. ESRD secondary to HTN/DMII 11/05/2016    Type 2 diabetes mellitus with stage 3a chronic kidney disease, with long-term current use of insulin 07/29/2014       Past Surgical History:   Procedure Laterality Date    ABLATION OF ARRHYTHMOGENIC FOCUS FOR ATRIAL  FIBRILLATION N/A 6/11/2024    Procedure: Ablation atrial fibrillation;  Surgeon: Bronson Bowden MD;  Location: [facility] EP LAB;  Service: Cardiology;  Laterality: N/A;  AF, FELICIANO (cx if SR), PVI, RFA, Carto, Gen, GP, 3 Prep    ABLATION, ATRIAL FLUTTER, TYPICAL  6/11/2024    Procedure: Ablation, Atrial Flutter, Typical;  Surgeon: Bronson Bowden MD;  Location: [facility] EP LAB;  Service: Cardiology;;    APPENDECTOMY      BONE MARROW BIOPSY Left 6/26/2024    Procedure: Biopsy-bone marrow;  Surgeon: Bridger Zapata MD;  Location: [facility] ENDO (4TH FLR);  Service: Oncology;  Laterality: Left;  6/24-pt knows to hold aspirin and eliquis as instructed by hem/onc, precall complete-Kpvt    CARDIOVERSION  6/11/2024    Procedure: Cardioversion;  Surgeon: Bronson Bowden MD;  Location: [facility] EP LAB;  Service: Cardiology;;    CATARACT EXTRACTION W/  INTRAOCULAR LENS IMPLANT Right 3/13/2024    Procedure: EXTRACTION, CATARACT, WITH IOL INSERTION;  Surgeon: Jennifer Palacio MD;  Location: [facility] OR;  Service: Ophthalmology;  Laterality: Right;    CATARACT EXTRACTION W/  INTRAOCULAR LENS IMPLANT Left 4/10/2024    Procedure: EXTRACTION, CATARACT, WITH IOL INSERTION;  Surgeon: Jennifer Palacio MD;  Location: [facility] OR;  Service: Ophthalmology;  Laterality: Left;    COLONOSCOPY N/A 12/21/2020    Procedure: COLONOSCOPY;  Surgeon: Tico Bell MD;  Location: [facility] ENDO (4TH FLR);  Service: Endoscopy;  Laterality: N/A;  ok to hold eliquis per Dr. Valadez, see telephone encounter 11/13/2020-MS  covid test 12/18 Emanuel    ECHOCARDIOGRAM,TRANSESOPHAGEAL N/A 2/3/2025    Procedure: Transesophageal echo (FELICIANO) intra-procedure log documentation;  Surgeon: Graysno Lin III, MD;  Location: [facility] EP LAB;  Service: Cardiology;  Laterality: N/A;    KIDNEY TRANSPLANT      TRANSESOPHAGEAL ECHOCARDIOGRAPHY N/A 8/26/2019    Procedure: ECHOCARDIOGRAM, TRANSESOPHAGEAL;  Surgeon: [facility] Diagnostic Provider;  Location: [facility] EP LAB;  Service: Cardiology;   Laterality: N/A;    TRANSESOPHAGEAL ECHOCARDIOGRAPHY N/A 6/11/2024    Procedure: ECHOCARDIOGRAM, TRANSESOPHAGEAL;  Surgeon: Grayson Lin III, MD;  Location: [facility] EP LAB;  Service: Cardiology;  Laterality: N/A;    TREATMENT OF CARDIAC ARRHYTHMIA N/A 8/26/2019    Procedure: CARDIOVERSION;  Surgeon: Bronson Bowden MD;  Location: [facility] EP LAB;  Service: Cardiology;  Laterality: N/A;  AF, FELICIANO, DCCV, MAC, GP, 3 PREP    TREATMENT OF CARDIAC ARRHYTHMIA N/A 2/3/2025    Procedure: Cardioversion or Defibrillation;  Surgeon: Bronson Bowden MD;  Location: [facility] EP LAB;  Service: Cardiology;  Laterality: N/A;  AF, FELICIANO, DCCV, MAC, GP, 3 Prep       Review of patient's allergies indicates:   Allergen Reactions    Nifedipine Other (See Comments)     Gingival hyperplasia       Current Facility-Administered Medications on File Prior to Encounter   Medication    balanced salt irrigation intra-ocular solution 1 drop    phenylephrine HCL 10% ophthalmic solution 1 drop    proparacaine 0.5 % ophthalmic solution 1 drop    sodium chloride 0.9% flush 10 mL    TETRAcaine HCl (PF) 0.5 % Drop 1 drop     Current Outpatient Medications on File Prior to Encounter   Medication Sig    atorvastatin (LIPITOR) 80 MG tablet Take 1 tablet (80 mg total) by mouth once daily.    blood sugar diagnostic Strp Use to check blood glucose 1 times daily, to use with insurance preferred meter    blood-glucose meter Misc Use to check blood glucose 1 times daily, to use with insurance preferred meter    blood-glucose sensor Kayla Gin 3, use as directed, change every 14 days , e 11.65    carvediloL (COREG) 25 MG tablet Take 1 tablet (25 mg total) by mouth 2 (two) times daily.    doxazosin (CARDURA) 1 MG tablet Take 1 tablet (1 mg total) by mouth every evening.    empagliflozin (JARDIANCE) 10 mg tablet Take 1 tablet (10 mg total) by mouth once daily.    ergocalciferol (ERGOCALCIFEROL) 50,000 unit Cap Take 1 capsule (50,000 Units total) by mouth every 7 days.     "furosemide (LASIX) 40 MG tablet Take 2 tablets (80 mg total) by mouth 2 (two) times a day.    gabapentin (NEURONTIN) 300 MG capsule Take 1 capsule by mouth in the morning, 1 capsule midday, and 3 capsules at night.    glucagon (GVOKE HYPOPEN 2-PACK) 1 mg/0.2 mL AtIn Inject 0.2 mLs into the skin as needed (severe hypoglycemia).    hydrALAZINE (APRESOLINE) 100 MG tablet Take 1 tablet (100 mg total) by mouth every 8 (eight) hours.    hydroCHLOROthiazide (HYDRODIURIL) 12.5 MG Tab Take 1 tablet (12.5 mg total) by mouth once daily.    insulin aspart U-100 (NOVOLOG U-100 INSULIN ASPART) 100 unit/mL injection Use in pump, max daily 100 units.    insulin aspart U-100 (NOVOLOG) 100 unit/mL (3 mL) InPn pen Inject 10 units under the skin w/ breakfast, 7 units at lunch and dinner. Scale 180-230+2, 231-280+4, 281-330+6, 331-380+8, >380+10.  Max daily 57 units.    insulin glargine U-100, Lantus, (LANTUS SOLOSTAR U-100 INSULIN) 100 unit/mL (3 mL) InPn pen Inject 20 Units into the skin every morning.    insulin  cart,aut,G6/7,cntr (OMNIPOD 5 G6-G7 INTRO KT,GEN5,) Crtg Use as directed.    insulin pump cart,auto,BT,G6/7 (OMNIPOD 5 G6-G7 PODS, GEN 5,) Crtg Change every 48 hours.    lancets 30 gauge Misc Use to check blood glucose 1 times daily, to use with insurance preferred meter    magnesium oxide (MAG-OX) 400 mg (241.3 mg magnesium) tablet Take 1 tablet (400 mg total) by mouth 2 (two) times daily.    meclizine (ANTIVERT) 25 mg tablet Take 1 tablet (25 mg total) by mouth 3 (three) times daily as needed for Dizziness.    mycophenolate (CELLCEPT) 250 mg Cap Take 2 capsules (500 mg total) by mouth 2 (two) times daily.    pen needle, diabetic (BD ULTRA-FINE LO PEN NEEDLE) 32 gauge x 5/32" Ndle USE TO ADMINISTER INSULIN 4 TIMES DAILY.    predniSONE (DELTASONE) 5 MG tablet Take 1 tablet (5 mg total) by mouth once daily.    rivaroxaban (XARELTO) 15 mg Tab Take 1 tablet (15 mg total) by mouth daily with dinner or evening meal.    " tacrolimus (PROGRAF) 0.5 MG Cap Take 1 capsule (0.5 mg total) by mouth once daily. Start tomorrow    valsartan (DIOVAN) 320 MG tablet Take 1 tablet (320 mg total) by mouth once daily.    [DISCONTINUED] insulin lispro (HUMALOG KWIKPEN INSULIN) 100 unit/mL pen Inject 18 units w/ breakfast, 16 units w/ lunch and dinner plus scale 150-200 +2, 201-250 +4, 251-300 +6, 301-350 +8.     Family History       Problem Relation (Age of Onset)    Cancer Brother    Diabetes Mother    Heart disease Mother, Father    Hypertension Mother, Brother          Tobacco Use    Smoking status: Former     Current packs/day: 0.00     Average packs/day: 0.5 packs/day for 32.0 years (16.0 ttl pk-yrs)     Types: Cigarettes     Start date: 1984     Quit date: 2016     Years since quittin.2    Smokeless tobacco: Never   Substance and Sexual Activity    Alcohol use: Yes     Comment: Pt reports occasional beers on Sundays. Pt reports drinking daily prior to ESRD diagnosis.    Drug use: No    Sexual activity: Yes     Partners: Female     Review of Systems   Constitutional: Negative.   Cardiovascular:  Positive for irregular heartbeat.   Respiratory: Negative.     Neurological: Negative.      Objective:     Vital Signs (Most Recent):  Temp: 97.8 °F (36.6 °C) (25 1048)  Pulse: 61 (25 1048)  Resp: 18 (25 1048)  BP: (!) 153/74 (25 1048)  SpO2: 98 % (25 1048) Vital Signs (24h Range):  Temp:  [96.5 °F (35.8 °C)-98.4 °F (36.9 °C)] 97.8 °F (36.6 °C)  Pulse:  [54-77] 61  Resp:  [15-22] 18  SpO2:  [98 %-100 %] 98 %  BP: (121-220)/() 153/74       Weight: 90 kg (198 lb 6.6 oz)  Body mass index is 26.3 kg/m².    SpO2: 98 %        Physical Exam  Constitutional:       Appearance: Normal appearance.   HENT:      Head: Atraumatic.   Cardiovascular:      Rate and Rhythm: Rhythm irregular.      Heart sounds: Normal heart sounds.   Pulmonary:      Effort: Pulmonary effort is normal.      Breath sounds: Normal breath  sounds.   Skin:     General: Skin is warm.      Capillary Refill: Capillary refill takes less than 2 seconds.   Neurological:      Mental Status: He is alert and oriented to person, place, and time.   Psychiatric:         Mood and Affect: Mood normal.         Behavior: Behavior normal.            Significant Labs:   Recent Labs   Lab 02/20/25  1036 02/20/25 1927 02/21/25  0458   WBC  --  5.58 4.60   HGB 10.7* 10.1* 9.8*   HCT 37.6* 33.6* 32.1*   PLT  --  133* 122*       Recent Labs   Lab 02/20/25 1927 02/21/25  0458   * 131*   K 3.0* 4.0   CL 95 94*   CO2 18* 26   BUN 83* 78*   CREATININE 3.0* 2.6*   CALCIUM 9.5 9.3   PHOS  --  3.9       Recent Labs   Lab 02/20/25 1927 02/21/25  0458   ALKPHOS 45 43   BILITOT 0.3 0.3   PROT 6.6 5.7*   ALT <5* <5*   AST 14 16     Lab Results   Component Value Date    CHOL 172 12/07/2024    HDL 30 (L) 12/07/2024    LDLCALC 105.4 12/07/2024    TRIG 183 (H) 12/07/2024     Lab Results   Component Value Date    HGBA1C 8.6 (H) 01/27/2025       Lab Results   Component Value Date    BNP 1,055 (H) 02/20/2025     (H) 06/25/2024    BNP 3,203 (H) 06/15/2024         Significant Imaging:   ECHO 2/20/25     Left Ventricle: There is mild concentric hypertrophy. There is low normal systolic function with a visually estimated ejection fraction of 50 - 55%. Grade III diastolic dysfunction.    Left Ventricle: The left ventricle is at the upper limits of normal in size. Mildly increased wall thickness. There is mild concentric hypertrophy. Regional wall motion abnormalities present. See diagram for wall motion findings. There is low normal systolic function with a visually estimated ejection fraction of 50 - 55%. Ejection fraction is approximately 53%. Grade III diastolic dysfunction.    Right Ventricle: Systolic function is borderline low despite the low TAPSE.    Left Atrium: Left atrium is moderately dilated.    Aortic Valve: The aortic valve is a trileaflet valve. There is mild aortic  valve sclerosis. There is moderate annular calcification present. There is mild aortic regurgitation with a centrally directed jet.    Mitral Valve: There is mild regurgitation with a centrally directed jet.    Pericardium: There is a trivial posterior effusion and under the RA.    Tricuspid Valve: There is mild regurgitation.    Pulmonary Artery: The estimated pulmonary artery systolic pressure is 30 mmHg.                Assessment and Plan:     * Syncope  Pt with Hx of persistent AF on coreg 25 mg BID. S/p FELICIANO/DCCV 2/3/25. Reported episode of syncope x3 prompting EMS call. EMS reported witnessed pink frothy sputum, intense sweating, dilated pupils, and bradycardia to the 20s. Transcutaneous paced by EMS at a rate of 60.    Tele reviewed: AF with slow ventricular rate.    Recommendations  - Syncope with notable bradycardia likely iatrogenic, from BB  - Hold all BB for now   - Continue with tele monitor 24 hrs   - Will consider 30 days cardiac event monitor prior to discharge       Persistent atrial fibrillation  Pt with persistent AF. With multiple FELICIANO/DCCV (8/2019, 5/2023, 2/2025).   - S/p PVI with LA posterior wall isolation, CTI/PWI ablation 6/11/2024 Dr. Meade. HV interval post ablation 53ms.  - Tele: AF rate controlled     Recommendations   - Continue with xarelto 15 mg qd   - STOP coreg 25 mg BID   - Upon discharge; discharge with cardiac 30 day event monitor   - Follow up EP clinic outpt for evaluation of ablation           Thank you for your consult. I will follow-up with patient. Please contact us if you have any additional questions. Rest of the care as per primary team.     Thanh Sanchez MD  Cardiac Electrophysiology  [facility] - Cardiology Stepdown

## 2025-02-21 NOTE — ASSESSMENT & PLAN NOTE
Possibly associated with over-diuresis.    Hold Lasix and HCTZ.  Mild and asymptomatic, expect to improve with PO fluids, s/p  mL.  If no improvement, consider urine/serum Osm and urine Na.

## 2025-02-21 NOTE — SUBJECTIVE & OBJECTIVE
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    CKD (chronic kidney disease) stage 4, GFR 15-29 ml/min 07/29/2014    Colon polyps 2014    Depression - situational 07/29/2014    Diabetes type 2 since 2000 07/29/2014    Diabetic neuropathy 07/29/2014    Encounter for blood transfusion     History of cardioversion 10/03/2019    History of hepatitis C, s/p successful Rx w/ SVR24 - 9/2017 07/29/2014    Genotype 1a, treatment naive 10/2014 liver biopsy - grade 1 / stage 1 Completed Harvoni w/ SVR    Hyperlipidemia 07/29/2014    Hypertension     Neuropathy     Organ transplant candidate 07/29/2014    Pancreatitis     S/P cadaveric kidney transplant 11/5/2016. ESRD secondary to HTN/DMII 11/05/2016    Type 2 diabetes mellitus with stage 3a chronic kidney disease, with long-term current use of insulin 07/29/2014       Past Surgical History:   Procedure Laterality Date    ABLATION OF ARRHYTHMOGENIC FOCUS FOR ATRIAL FIBRILLATION N/A 6/11/2024    Procedure: Ablation atrial fibrillation;  Surgeon: Bronson Bowden MD;  Location: [facility] EP LAB;  Service: Cardiology;  Laterality: N/A;  AF, FELICIANO (cx if SR), PVI, RFA, Carto, Gen, GP, 3 Prep    ABLATION, ATRIAL FLUTTER, TYPICAL  6/11/2024    Procedure: Ablation, Atrial Flutter, Typical;  Surgeon: Bronson Bowden MD;  Location: [facility] EP LAB;  Service: Cardiology;;    APPENDECTOMY      BONE MARROW BIOPSY Left 6/26/2024    Procedure: Biopsy-bone marrow;  Surgeon: Bridger Zapata MD;  Location: [facility] ENDO ([address] [place] [address]);  Service: Oncology;  Laterality: Left;  6/24-pt knows to hold aspirin and eliquis as instructed by hem/onc, precall complete-Kpvt    CARDIOVERSION  6/11/2024    Procedure: Cardioversion;  Surgeon: Bronson Bowden MD;  Location: [facility] EP LAB;  Service: Cardiology;;    CATARACT EXTRACTION W/  INTRAOCULAR LENS IMPLANT  Right 3/13/2024    Procedure: EXTRACTION, CATARACT, WITH IOL INSERTION;  Surgeon: Jennifer Palacio MD;  Location: [facility] OR;  Service: Ophthalmology;  Laterality: Right;    CATARACT EXTRACTION W/  INTRAOCULAR LENS IMPLANT Left 4/10/2024    Procedure: EXTRACTION, CATARACT, WITH IOL INSERTION;  Surgeon: Jennifer Palacio MD;  Location: [facility] OR;  Service: Ophthalmology;  Laterality: Left;    COLONOSCOPY N/A 12/21/2020    Procedure: COLONOSCOPY;  Surgeon: Tico Bell MD;  Location: [facility] ENDO (4TH FLR);  Service: Endoscopy;  Laterality: N/A;  ok to hold eliquis per Dr. Valadez, see telephone encounter 11/13/2020-MS  covid test 12/18 [place]    ECHOCARDIOGRAM,TRANSESOPHAGEAL N/A 2/3/2025    Procedure: Transesophageal echo (FELICIANO) intra-procedure log documentation;  Surgeon: Grayson Lin III, MD;  Location: [facility] EP LAB;  Service: Cardiology;  Laterality: N/A;    KIDNEY TRANSPLANT      TRANSESOPHAGEAL ECHOCARDIOGRAPHY N/A 8/26/2019    Procedure: ECHOCARDIOGRAM, TRANSESOPHAGEAL;  Surgeon: [facility] Diagnostic Provider;  Location: [facility] EP LAB;  Service: Cardiology;  Laterality: N/A;    TRANSESOPHAGEAL ECHOCARDIOGRAPHY N/A 6/11/2024    Procedure: ECHOCARDIOGRAM, TRANSESOPHAGEAL;  Surgeon: Grayson Lin III, MD;  Location: [facility] EP LAB;  Service: Cardiology;  Laterality: N/A;    TREATMENT OF CARDIAC ARRHYTHMIA N/A 8/26/2019    Procedure: CARDIOVERSION;  Surgeon: Bronson Bowden MD;  Location: [facility] EP LAB;  Service: Cardiology;  Laterality: N/A;  AF, FELICIANO, DCCV, MAC, GP, 3 PREP    TREATMENT OF CARDIAC ARRHYTHMIA N/A 2/3/2025    Procedure: Cardioversion or Defibrillation;  Surgeon: Bronson Bowden MD;  Location: [facility] EP LAB;  Service: Cardiology;  Laterality: N/A;  AF, FELICIANO, DCCV, MAC, GP, 3 Prep       Review of patient's allergies indicates:   Allergen Reactions    Nifedipine Other (See Comments)     Gingival hyperplasia       Current Facility-Administered Medications on File Prior to Encounter   Medication    balanced salt  irrigation intra-ocular solution 1 drop    phenylephrine HCL 10% ophthalmic solution 1 drop    proparacaine 0.5 % ophthalmic solution 1 drop    sodium chloride 0.9% flush 10 mL    TETRAcaine HCl (PF) 0.5 % Drop 1 drop     Current Outpatient Medications on File Prior to Encounter   Medication Sig    atorvastatin (LIPITOR) 80 MG tablet Take 1 tablet (80 mg total) by mouth once daily.    blood sugar diagnostic Strp Use to check blood glucose 1 times daily, to use with insurance preferred meter    blood-glucose meter Misc Use to check blood glucose 1 times daily, to use with insurance preferred meter    blood-glucose sensor Kayla Gin 3, use as directed, change every 14 days , e 11.65    carvediloL (COREG) 25 MG tablet Take 1 tablet (25 mg total) by mouth 2 (two) times daily.    doxazosin (CARDURA) 1 MG tablet Take 1 tablet (1 mg total) by mouth every evening.    empagliflozin (JARDIANCE) 10 mg tablet Take 1 tablet (10 mg total) by mouth once daily.    ergocalciferol (ERGOCALCIFEROL) 50,000 unit Cap Take 1 capsule (50,000 Units total) by mouth every 7 days.    furosemide (LASIX) 40 MG tablet Take 2 tablets (80 mg total) by mouth 2 (two) times a day.    gabapentin (NEURONTIN) 300 MG capsule Take 1 capsule by mouth in the morning, 1 capsule midday, and 3 capsules at night.    glucagon (GVOKE HYPOPEN 2-PACK) 1 mg/0.2 mL AtIn Inject 0.2 mLs into the skin as needed (severe hypoglycemia).    hydrALAZINE (APRESOLINE) 100 MG tablet Take 1 tablet (100 mg total) by mouth every 8 (eight) hours.    hydroCHLOROthiazide (HYDRODIURIL) 12.5 MG Tab Take 1 tablet (12.5 mg total) by mouth once daily.    insulin aspart U-100 (NOVOLOG U-100 INSULIN ASPART) 100 unit/mL injection Use in pump, max daily 100 units.    insulin aspart U-100 (NOVOLOG) 100 unit/mL (3 mL) InPn pen Inject 10 units under the skin w/ breakfast, 7 units at lunch and dinner. Scale 180-230+2, 231-280+4, 281-330+6, 331-380+8, >380+10.  Max daily 57 units.    insulin  "glargine U-100, Lantus, (LANTUS SOLOSTAR U-100 INSULIN) 100 unit/mL (3 mL) InPn pen Inject 20 Units into the skin every morning.    insulin  cart,aut,G6/7,cntr (OMNIPOD 5 G6-G7 INTRO KT,GEN5,) Crtg Use as directed.    insulin pump cart,auto,BT,G6/7 (OMNIPOD 5 G6-G7 PODS, GEN 5,) Crtg Change every 48 hours.    lancets 30 gauge Misc Use to check blood glucose 1 times daily, to use with insurance preferred meter    magnesium oxide (MAG-OX) 400 mg (241.3 mg magnesium) tablet Take 1 tablet (400 mg total) by mouth 2 (two) times daily.    meclizine (ANTIVERT) 25 mg tablet Take 1 tablet (25 mg total) by mouth 3 (three) times daily as needed for Dizziness.    mycophenolate (CELLCEPT) 250 mg Cap Take 2 capsules (500 mg total) by mouth 2 (two) times daily.    pen needle, diabetic (BD ULTRA-FINE LO PEN NEEDLE) 32 gauge x 5/32" Ndle USE TO ADMINISTER INSULIN 4 TIMES DAILY.    predniSONE (DELTASONE) 5 MG tablet Take 1 tablet (5 mg total) by mouth once daily.    rivaroxaban (XARELTO) 15 mg Tab Take 1 tablet (15 mg total) by mouth daily with dinner or evening meal.    tacrolimus (PROGRAF) 0.5 MG Cap Take 1 capsule (0.5 mg total) by mouth once daily. Start tomorrow    valsartan (DIOVAN) 320 MG tablet Take 1 tablet (320 mg total) by mouth once daily.    [DISCONTINUED] insulin lispro (HUMALOG KWIKPEN INSULIN) 100 unit/mL pen Inject 18 units w/ breakfast, 16 units w/ lunch and dinner plus scale 150-200 +2, 201-250 +4, 251-300 +6, 301-350 +8.     Family History       Problem Relation (Age of Onset)    Cancer Brother    Diabetes Mother    Heart disease Mother, Father    Hypertension Mother, Brother          Tobacco Use    Smoking status: Former     Current packs/day: 0.00     Average packs/day: 0.5 packs/day for 32.0 years (16.0 ttl pk-yrs)     Types: Cigarettes     Start date: 1984     Quit date: 2016     Years since quittin.2    Smokeless tobacco: Never   Substance and Sexual Activity    Alcohol use: Yes     " Comment: Pt reports occasional beers on Sundays. Pt reports drinking daily prior to ESRD diagnosis.    Drug use: No    Sexual activity: Yes     Partners: Female     Review of Systems: See HPI  Objective:     Vital Signs (Most Recent):  Temp: 98.4 °F (36.9 °C) (02/21/25 0429)  Pulse: 69 (02/21/25 0429)  Resp: 18 (02/21/25 0429)  BP: (!) 166/77 (02/21/25 0429)  SpO2: 98 % (02/21/25 0429) Vital Signs (24h Range):  Temp:  [97.7 °F (36.5 °C)-98.4 °F (36.9 °C)] 98.4 °F (36.9 °C)  Pulse:  [54-77] 69  Resp:  [15-22] 18  SpO2:  [98 %-100 %] 98 %  BP: (121-220)/() 166/77     Weight: 90.7 kg (199 lb 15.3 oz)  Body mass index is 26.38 kg/m².     Physical Exam  Vitals reviewed.   Constitutional:       General: He is not in acute distress.     Appearance: He is not toxic-appearing.   HENT:      Mouth/Throat:      Mouth: Mucous membranes are moist.      Comments: Poor dentition  Eyes:      Extraocular Movements: Extraocular movements intact.      Pupils: Pupils are equal, round, and reactive to light.   Cardiovascular:      Rate and Rhythm: Normal rate and regular rhythm.      Heart sounds: Normal heart sounds. No murmur heard.  Pulmonary:      Effort: Pulmonary effort is normal. No respiratory distress.      Breath sounds: Normal breath sounds.   Abdominal:      General: Bowel sounds are normal. There is no distension.      Palpations: Abdomen is soft.      Tenderness: There is no abdominal tenderness.   Musculoskeletal:      Right lower leg: No edema.      Left lower leg: No edema.   Skin:     General: Skin is warm and dry.   Neurological:      General: No focal deficit present.      Mental Status: He is alert and oriented to person, place, and time.      Cranial Nerves: No cranial nerve deficit.   Psychiatric:         Mood and Affect: Mood normal.         Behavior: Behavior normal.              CRANIAL NERVES     CN III, IV, VI   Pupils are equal, round, and reactive to light.       Significant Labs personally reviewed:   WBC 5.58, Hgb 10.1, Na 129, K 3.0, CO2 18, BUN 83, Cr 3.0, troponin 170 from 102    CXR personally reviewed:  No obvious consolidations, edema, or effusion

## 2025-02-21 NOTE — ASSESSMENT & PLAN NOTE
Chronically elevated, possibly associated with PHUONG/CKD and bradycardia.  No chest pain  Trend trops to plateau  Echo pending  Stat EKG for new chest pain

## 2025-02-21 NOTE — HPI
70 yo M with PMH of CKD 3-4, history of kidney transplant, chronic combined CHF (FELICIANO 2/3/25 EF 40-45%, TTE 12/8/24 G3DD), CVA right MCA 12/24 with minimal residual LLE and left hand weakness, DM2 A1c 8.6 on 01/27, atrial fib/flutter s/p DCCV 02/03/2025 who presents to the ED with chief complaint of 3 syncopal episodes. As per chart review;  Pt unaware of these episodes and denies any prodrome.  Wife at bedside says that at approx 5:30-6 p.m. day of admission, pt was sitting down talking to his wife when suddenly his head lowered, eyes closed, and pt was unresponsive for 20-30 seconds.  No convulsions, no bowel or bladder incontinence, no tongue biting.  Pt denied prior dizziness, change to vision, diaphoresis, palpitations.  Wife does report significant diaphoresis noted after 1st syncopal event.  Wife says she called EMS during 1st episode.  Pt was awake for a few seconds and had another very similar episode.  Pt recovered after 20-30 seconds and seemed fine.  He stood up and went to the restroom with no problems.  EMS arrived and ultimately, pt had a 3rd episode.  According to ED physician:  EMS reported witnessed pink frothy sputum, intense sweating, dilated pupils, and bradycardia to the 20s.  Transcutaneous paced by EMS at a rate of 60.  Responsive within 25 seconds .  Pt says that he does remember vomiting in the ambulance but denies any associated abdominal pain or nausea.     EP consulted for symptomatic bradycardia.     EP background   Patient with Hx of persistent AF, underwent DCCV and flecainide initiation in 8/2019. Over the past several months he has had recurrent symptomatic AF. He underwent DCCV in 8/2019, and was started on flecainide 100 mg bid post-procedure.  QRS wide on ECG--flecainide reduced to 50 mg bid. By 5/2023 QRS had narrowed, although he had an episode of syncope in 5/2023 thought to be due to hypotension 2/2 chlorthalidone. SPECT 5/15/2023 negative for ischemia or scar. Event monitor  1/2024--one episode of AF noted 12/24/2023, and episodes of SR and junctional rhythm with HR ranging from 54-74. S/p CTI/PVI/LA PWI with Dr. Bowden in June 2024 (PVI with LA posterior wall isolation). 6/11/2024 Dr. Meade. HV interval post ablation 53ms.  Multaq discontinue as pt remained NSR after the procedure. Only on coreg 25 BID. Had another FELICIANO/cardioversion 2/23/25 with plan to redo ablation outpatient.

## 2025-02-21 NOTE — NURSING
Patient arrived to floor from ED with Med J, via stretcher with wife. Patient ambulated without difficulty to bed. VVS, RA, AAOx4. NPO

## 2025-02-21 NOTE — ASSESSMENT & PLAN NOTE
History of transplanted kidney  Cr 3.0 on admission, baseline 2-2.5 (since 07/2024)  Possibly over-diuresis, s/p 500 mL IVF in ED    Plan   Holding HCTZ, Lasix, Jardiance, valsartan  Continue MMF, tacrolimus, and prednisone  If no improvement, consider urine studies and renal U/S.  KTM consulted

## 2025-02-21 NOTE — ASSESSMENT & PLAN NOTE
History of transplanted kidney  Cr 3.0 on admission, baseline 2-2.5 (since 07/2024)  Possibly over-diuresis, s/p 500 mL IVF in ED  Pedialyte 400 mL ordered x1  Holding HCTZ, Lasix, Jardiance, valsartan  Tacrolimus level pending  Continue MMF and prednisone  If no improvement, consider urine studies and renal U/S.  KTM consulted

## 2025-02-21 NOTE — ASSESSMENT & PLAN NOTE
Patient's FSGs are uncontrolled due to hyperglycemia on current medication regimen.  Last A1c reviewed-   Lab Results   Component Value Date    HGBA1C 8.6 (H) 01/27/2025     Most recent fingerstick glucose reviewed-   Recent Labs   Lab 02/21/25  0640 02/21/25  1044 02/21/25  1521   POCTGLUCOSE 178* 177* 293*     Current correctional scale  Low  Maintain anti-hyperglycemic dose as follows-   Antihyperglycemics (From admission, onward)      Start     Stop Route Frequency Ordered    02/21/25 0900  insulin glargine U-100 (Lantus) pen 10 Units         -- SubQ Daily 02/20/25 2248    02/20/25 2347  insulin aspart U-100 pen 0-5 Units         -- SubQ Before meals & nightly PRN 02/20/25 2248          Hold Oral hypoglycemics while patient is in the hospital.

## 2025-02-21 NOTE — ASSESSMENT & PLAN NOTE
Possibly associated with over-diuresis.      Plan   Hold Lasix and HCTZ.  Mild and asymptomatic, expect to improve with PO fluids, s/p  mL.  If no improvement, consider urine/serum Osm and urine Na.

## 2025-02-21 NOTE — PLAN OF CARE
Plan of care discussed with patient. Patient is free of fall/trauma/injury. Denies CP, SOB, or pain/discomfort. Scheduled BP medication given for elevated BP. Afib on telemetry, currently NPO for possible intervention. All questions addressed. Will continue to monitor

## 2025-02-21 NOTE — HOSPITAL COURSE
68 yo M with extensive PMH as below, notably CKD 3-4, history of kidney transplant, chronic combined CHF (FELICIANO 2/3/25 EF 40-45%, TTE 12/8/24 G3DD), CVA right MCA 12/24 with minimal residual LLE and left hand weakness, DM2 A1c 8.6 on 01/27, atrial fib/flutter s/p DCCV 02/03/2025 admitted to Hospital Medicine on 2/20 with syncope and bradycardia. EP consulted; recommending holding BB. Hospital course complicated by PHUONG, possibly ATN. KTM consulted, ARB held and he was started on IVFs with stabilization of Cr. KTM okay with discharging patient home, as well as resuming HCTZ and lasix prn. Plan for cardiac event monitor as an outpatient along with EP follow-up. He became hypertensive, hydralazine and doxazosin resumed, still holding ARB. Will need further optimization as an outpatient as BB and ARB are being held. Can consider procardia to be started in the outpatient setting after discussion with PCP. He is medically ready for discharge.    Physical Exam  Cardiovascular:      Rate and Rhythm: Normal rate and regular rhythm.   Pulmonary:      Effort: Pulmonary effort is normal.   Abdominal:      General: Abdomen is flat.   Musculoskeletal:         General: Normal range of motion.   Skin:     General: Skin is warm.   Neurological:      Mental Status: He is alert.

## 2025-02-21 NOTE — CARE UPDATE
I have reviewed the chart of Ravi Zamorano who is hospitalized for the following:    Active Hospital Problems    Diagnosis    *Syncope    Bradycardia    Acute kidney injury superimposed on chronic kidney disease    Atrial flutter    Hyponatremia    Hypokalemia    Metabolic acidosis    Anemia of chronic disease     H/H stable, continue to monitor       Status post -donor kidney transplantation    Immunosuppressed status     S/p kidney transplant, on MMF, pred, tacro (held currently)   Enhanced respiratory precautions      Elevated troponin    Persistent atrial fibrillation    Type 2 diabetes mellitus    Chronic combined systolic (congestive) and diastolic (congestive) heart failure    Uncontrolled hypertension        Kaya Chambers PA-C  [facility]

## 2025-02-21 NOTE — PLAN OF CARE
Plan of care discussed with patient. Patient ambulating with assist, fall precautions in place. Patient has no complaints of pain. Discussed medications and care. Patient has no questions at this time. Call light and personal belongings within reach. Patient's wife to stay at bedside. Patient and wife expressed understanding of necessity for staff to be present every time patient ambulates.

## 2025-02-22 VITALS
HEART RATE: 75 BPM | OXYGEN SATURATION: 97 % | TEMPERATURE: 97 F | BODY MASS INDEX: 26.3 KG/M2 | RESPIRATION RATE: 18 BRPM | SYSTOLIC BLOOD PRESSURE: 180 MMHG | HEIGHT: 73 IN | WEIGHT: 198.44 LBS | DIASTOLIC BLOOD PRESSURE: 90 MMHG

## 2025-02-22 LAB
ALBUMIN SERPL BCP-MCNC: 3.1 G/DL (ref 3.5–5.2)
ALP SERPL-CCNC: 42 U/L (ref 40–150)
ALT SERPL W/O P-5'-P-CCNC: <5 U/L (ref 10–44)
ANION GAP SERPL CALC-SCNC: 12 MMOL/L (ref 8–16)
ANION GAP SERPL CALC-SCNC: 9 MMOL/L (ref 8–16)
AST SERPL-CCNC: 13 U/L (ref 10–40)
BILIRUB SERPL-MCNC: 0.3 MG/DL (ref 0.1–1)
BUN SERPL-MCNC: 71 MG/DL (ref 8–23)
BUN SERPL-MCNC: 73 MG/DL (ref 8–23)
CALCIUM SERPL-MCNC: 9.1 MG/DL (ref 8.7–10.5)
CALCIUM SERPL-MCNC: 9.1 MG/DL (ref 8.7–10.5)
CHLORIDE SERPL-SCNC: 99 MMOL/L (ref 95–110)
CHLORIDE SERPL-SCNC: 99 MMOL/L (ref 95–110)
CO2 SERPL-SCNC: 22 MMOL/L (ref 23–29)
CO2 SERPL-SCNC: 24 MMOL/L (ref 23–29)
CREAT SERPL-MCNC: 2.4 MG/DL (ref 0.5–1.4)
CREAT SERPL-MCNC: 2.6 MG/DL (ref 0.5–1.4)
ERYTHROCYTE [DISTWIDTH] IN BLOOD BY AUTOMATED COUNT: 16.4 % (ref 11.5–14.5)
EST. GFR  (NO RACE VARIABLE): 26.2 ML/MIN/1.73 M^2
EST. GFR  (NO RACE VARIABLE): 28.9 ML/MIN/1.73 M^2
GLUCOSE SERPL-MCNC: 191 MG/DL (ref 70–110)
GLUCOSE SERPL-MCNC: 291 MG/DL (ref 70–110)
HCT VFR BLD AUTO: 34 % (ref 40–54)
HGB BLD-MCNC: 9.9 G/DL (ref 14–18)
MAGNESIUM SERPL-MCNC: 2.4 MG/DL (ref 1.6–2.6)
MCH RBC QN AUTO: 22.2 PG (ref 27–31)
MCHC RBC AUTO-ENTMCNC: 29.1 G/DL (ref 32–36)
MCV RBC AUTO: 76 FL (ref 82–98)
PHOSPHATE SERPL-MCNC: 4 MG/DL (ref 2.7–4.5)
PLATELET # BLD AUTO: 117 K/UL (ref 150–450)
PMV BLD AUTO: ABNORMAL FL (ref 9.2–12.9)
POCT GLUCOSE: 176 MG/DL (ref 70–110)
POCT GLUCOSE: 317 MG/DL (ref 70–110)
POTASSIUM SERPL-SCNC: 3.5 MMOL/L (ref 3.5–5.1)
POTASSIUM SERPL-SCNC: 3.7 MMOL/L (ref 3.5–5.1)
PROT SERPL-MCNC: 5.7 G/DL (ref 6–8.4)
RBC # BLD AUTO: 4.46 M/UL (ref 4.6–6.2)
SODIUM SERPL-SCNC: 132 MMOL/L (ref 136–145)
SODIUM SERPL-SCNC: 133 MMOL/L (ref 136–145)
TACROLIMUS BLD-MCNC: 2.5 NG/ML (ref 5–15)
TROPONIN I SERPL DL<=0.01 NG/ML-MCNC: 144 NG/L (ref 0–35)
TROPONIN I SERPL DL<=0.01 NG/ML-MCNC: 153 NG/L (ref 0–35)
TROPONIN I SERPL DL<=0.01 NG/ML-MCNC: 155 NG/L (ref 0–35)
WBC # BLD AUTO: 3.47 K/UL (ref 3.9–12.7)

## 2025-02-22 PROCEDURE — 36415 COLL VENOUS BLD VENIPUNCTURE: CPT | Mod: HCNC | Performed by: FAMILY MEDICINE

## 2025-02-22 PROCEDURE — 84100 ASSAY OF PHOSPHORUS: CPT | Mod: HCNC | Performed by: FAMILY MEDICINE

## 2025-02-22 PROCEDURE — 80048 BASIC METABOLIC PNL TOTAL CA: CPT | Mod: HCNC | Performed by: STUDENT IN AN ORGANIZED HEALTH CARE EDUCATION/TRAINING PROGRAM

## 2025-02-22 PROCEDURE — 85027 COMPLETE CBC AUTOMATED: CPT | Mod: HCNC | Performed by: FAMILY MEDICINE

## 2025-02-22 PROCEDURE — 25000003 PHARM REV CODE 250: Mod: HCNC | Performed by: STUDENT IN AN ORGANIZED HEALTH CARE EDUCATION/TRAINING PROGRAM

## 2025-02-22 PROCEDURE — 99233 SBSQ HOSP IP/OBS HIGH 50: CPT | Mod: HCNC,,, | Performed by: STUDENT IN AN ORGANIZED HEALTH CARE EDUCATION/TRAINING PROGRAM

## 2025-02-22 PROCEDURE — 80053 COMPREHEN METABOLIC PANEL: CPT | Mod: HCNC | Performed by: FAMILY MEDICINE

## 2025-02-22 PROCEDURE — 36415 COLL VENOUS BLD VENIPUNCTURE: CPT | Mod: HCNC,XB | Performed by: STUDENT IN AN ORGANIZED HEALTH CARE EDUCATION/TRAINING PROGRAM

## 2025-02-22 PROCEDURE — 25000003 PHARM REV CODE 250: Mod: HCNC | Performed by: FAMILY MEDICINE

## 2025-02-22 PROCEDURE — 99223 1ST HOSP IP/OBS HIGH 75: CPT | Mod: HCNC,GC,, | Performed by: STUDENT IN AN ORGANIZED HEALTH CARE EDUCATION/TRAINING PROGRAM

## 2025-02-22 PROCEDURE — 80197 ASSAY OF TACROLIMUS: CPT | Mod: HCNC | Performed by: FAMILY MEDICINE

## 2025-02-22 PROCEDURE — 83735 ASSAY OF MAGNESIUM: CPT | Mod: HCNC | Performed by: FAMILY MEDICINE

## 2025-02-22 PROCEDURE — 84484 ASSAY OF TROPONIN QUANT: CPT | Mod: 91,HCNC | Performed by: FAMILY MEDICINE

## 2025-02-22 PROCEDURE — 63600175 PHARM REV CODE 636 W HCPCS: Mod: HCNC | Performed by: INTERNAL MEDICINE

## 2025-02-22 PROCEDURE — 63600175 PHARM REV CODE 636 W HCPCS: Mod: HCNC | Performed by: FAMILY MEDICINE

## 2025-02-22 RX ORDER — FUROSEMIDE 40 MG/1
80 TABLET ORAL DAILY PRN
Start: 2025-02-22 | End: 2026-02-22

## 2025-02-22 RX ORDER — HYDRALAZINE HYDROCHLORIDE 50 MG/1
100 TABLET, FILM COATED ORAL EVERY 8 HOURS
Status: DISCONTINUED | OUTPATIENT
Start: 2025-02-22 | End: 2025-02-22 | Stop reason: HOSPADM

## 2025-02-22 RX ORDER — VALSARTAN 320 MG/1
320 TABLET ORAL DAILY
Qty: 90 TABLET | Refills: 3 | Status: SHIPPED | OUTPATIENT
Start: 2025-02-22 | End: 2026-02-17

## 2025-02-22 RX ORDER — DOXAZOSIN 2 MG/1
2 TABLET ORAL DAILY
Qty: 90 TABLET | Refills: 3 | Status: SHIPPED | OUTPATIENT
Start: 2025-02-23 | End: 2026-02-23

## 2025-02-22 RX ADMIN — SODIUM CHLORIDE 500 ML: 9 INJECTION, SOLUTION INTRAVENOUS at 01:02

## 2025-02-22 RX ADMIN — PREDNISONE 5 MG: 5 TABLET ORAL at 08:02

## 2025-02-22 RX ADMIN — DOXAZOSIN 2 MG: 2 TABLET ORAL at 08:02

## 2025-02-22 RX ADMIN — TACROLIMUS 0.5 MG: 0.5 CAPSULE ORAL at 08:02

## 2025-02-22 RX ADMIN — GABAPENTIN 100 MG: 100 CAPSULE ORAL at 05:02

## 2025-02-22 RX ADMIN — HYDRALAZINE HYDROCHLORIDE 100 MG: 50 TABLET ORAL at 11:02

## 2025-02-22 RX ADMIN — GABAPENTIN 100 MG: 100 CAPSULE ORAL at 11:02

## 2025-02-22 RX ADMIN — MYCOPHENOLATE MOFETIL 500 MG: 250 CAPSULE ORAL at 08:02

## 2025-02-22 RX ADMIN — HYDRALAZINE HYDROCHLORIDE 100 MG: 50 TABLET ORAL at 05:02

## 2025-02-22 RX ADMIN — INSULIN ASPART 4 UNITS: 100 INJECTION, SOLUTION INTRAVENOUS; SUBCUTANEOUS at 01:02

## 2025-02-22 RX ADMIN — INSULIN GLARGINE 10 UNITS: 100 INJECTION, SOLUTION SUBCUTANEOUS at 08:02

## 2025-02-22 RX ADMIN — ATORVASTATIN CALCIUM 80 MG: 40 TABLET, FILM COATED ORAL at 08:02

## 2025-02-22 NOTE — NURSING
Patient's bp 197/95 map 136. Team notified as order states, to notify MD for a SPB > 180. MD informed patient has nothing ordered to treat Bp. Hydralazine was given at 0548 and patient is not due to receive anything else for bp until 2pm. Per CODIE Constantino MD she will not be treating patient's bp because he is asymptomatic and implies give the Hydralazine time to work, although its been 3 hours.No orders given, plan of care ongoing.

## 2025-02-22 NOTE — NURSING
Bp taken manually,  180/90.Patient asymptomatic, denies headache, dizziness, lightheadedness, or blurred vision. MD Joe notified. MD aware and comfortable with discharging patient. No orders given at this time, plan of care ongoing.

## 2025-02-22 NOTE — NURSING
Patients bp reassessed 185/55 map 127, Hr 74. Manual bp done,  190/84.Patient asymptomatic. Denies any headache, dizziness, or blurred vision. Team notified. Per CODIE Constantino MD next dose of Hydralazine ordered to be administered now. MD informed patient stated his bp is always elevated at home on his normal regimen of Hydralazine 100mg t.I.d. suggesting patient may need a bp regimen adjustment. Per MD patient is on additional bp meds that he cannot receive due to his PHUONG  and trying to tightly control the bp in a hospital setting is not the goal implying we can cause more harm to the patient trying to lower bp. MD plan is to continue treating patient's bp with Hydralazine and 2mg Doxazosin.MD informed patients order parameters are to notify MD for a SBP >180. Suggested to MD an order adjustment be made if plan is not to treat sbp>180. No additional orders given at this time. Will continue monitoring bp. Plan of care ongoing.

## 2025-02-22 NOTE — PLAN OF CARE
AAOX4,VSS,O2 sats 97% on room air. Fall precaution in place. Patient remained free of any falls/ injury. Plan of care discussed with patient. Patient being discharged today. BP elevated throughout admission, patient remains asymptomatic. MD aware and do not wish to treat until SBP> or = to 200. Creatinine elevated, but improved 2.4. 500 ml bolus of NS administered per Nephrology, then patient being discharged. Patient has no complaints of chest pain/SOB, palpitations, dizziness, lightheadedness, headache, blurred vision, or n/v. Discussed medications and care.Hydralazine t.I.d being administered. Beta blockers held due to bradycardia and PHUONG. Patient has no questions at this time.Pt visualised and stable, with no acute distress noted.Call light within reach.Pt resting,bed at lowest position.Pt's spouse by the bedside.Will continue to monitor through the shift, plan of care ongoing.      Problem: Adult Inpatient Plan of Care  Goal: Plan of Care Review  Outcome: Progressing  Goal: Patient-Specific Goal (Individualized)  Outcome: Progressing  Goal: Absence of Hospital-Acquired Illness or Injury  Outcome: Met  Goal: Optimal Comfort and Wellbeing  Outcome: Progressing  Goal: Readiness for Transition of Care  Outcome: met     Problem: Infection  Goal: Absence of Infection Signs and Symptoms  Outcome: Met    Problem: Diabetes Comorbidity  Goal: Blood Glucose Level Within Targeted Range  Outcome: Progressing     Problem: Acute Kidney Injury/Impairment  Goal: Fluid and Electrolyte Balance  Outcome: Progressing  Goal: Improved Oral Intake  Outcome: Progressing  Goal: Effective Renal Function  Outcome: Progressing     Problem: Fall Injury Risk  Goal: Absence of Fall and Fall-Related Injury  Outcome: Met     Problem: Neutropenia  Goal: Absence of Infection  Outcome: Progressing

## 2025-02-22 NOTE — NURSING
Order parameters changed to notify MD of a SBP equal to or greater than 200. Plan of care ongoing.

## 2025-02-22 NOTE — SUBJECTIVE & OBJECTIVE
Subjective:   History of Present Illness:  No notes on file    Mr. Zamorano is a 69 y.o. year old male who is status post Kidney Transplant - 11/5/2016  (#1).    His maintenance immunosuppression consists of:   Immunosuppressants (From admission, onward)      Start     Stop Route Frequency Ordered    02/22/25 0800  tacrolimus (PROGRAF) capsule (SUBLINGUAL) 0.5 mg         -- SL Every morning 02/21/25 1538    02/20/25 2315  mycophenolate capsule 500 mg         -- Oral 2 times daily 02/20/25 2301            Hospital Course:  No notes on file    Interval History:  Creatinine improved. Patient feels well. Workup for arrhythmia syncope on continuing discharge.    Past Medical, Surgical, Family, and Social History:   Unchanged from H&P.    Scheduled Meds:   atorvastatin  80 mg Oral Daily    doxazosin  2 mg Oral Daily    electrolytes-dextrose  400 mL Oral Once    gabapentin  100 mg Oral Before breakfast    And    gabapentin  100 mg Oral Daily with lunch    And    gabapentin  300 mg Oral QHS    hydrALAZINE  100 mg Oral Q8H    insulin glargine U-100  10 Units Subcutaneous Daily    mycophenolate  500 mg Oral BID    predniSONE  5 mg Oral Daily    rivaroxaban  15 mg Oral Daily with dinner    sodium chloride 0.9%  500 mL Intravenous Once    tacrolimus  0.5 mg Sublingual Daily AM     Continuous Infusions:  PRN Meds:  Current Facility-Administered Medications:     acetaminophen, 1,000 mg, Oral, Q8H PRN    acetaminophen, 650 mg, Oral, Q4H PRN    aluminum-magnesium hydroxide-simethicone, 30 mL, Oral, QID PRN    dextrose 50%, 12.5 g, Intravenous, PRN    dextrose 50%, 25 g, Intravenous, PRN    glucagon (human recombinant), 1 mg, Intramuscular, PRN    glucose, 16 g, Oral, PRN    glucose, 24 g, Oral, PRN    insulin aspart U-100, 0-5 Units, Subcutaneous, QID (AC + HS) PRN    melatonin, 6 mg, Oral, Nightly PRN    naloxone, 0.02 mg, Intravenous, PRN    ondansetron, 4 mg, Oral, Q6H PRN    polyethylene glycol, 17 g, Oral, Daily PRN     "prochlorperazine, 5 mg, Intravenous, Q6H PRN    simethicone, 1 tablet, Oral, QID PRN    sodium chloride 0.9%, 5 mL, Intravenous, PRN    Intake/Output - Last 3 Shifts         02/20 0700  02/21 0659 02/21 0700  02/22 0659 02/22 0700  02/23 0659    P.O.  684 462    Total Intake(mL/kg)  684 (7.6) 462 (5.1)    Urine (mL/kg/hr)   225 (0.4)    Total Output   225    Net  +684 +237           Urine Occurrence  2 x              Review of Systems   Constitutional:  Negative for fever.   Respiratory:  Negative for shortness of breath.    Cardiovascular:  Negative for leg swelling.   Gastrointestinal:  Negative for abdominal pain.   Genitourinary:  Negative for decreased urine volume.   Neurological:  Negative for weakness.      Objective:     Vital Signs (Most Recent):  Temp: 97.1 °F (36.2 °C) (02/22/25 1153)  Pulse: 75 (02/22/25 1153)  Resp: 18 (02/22/25 1153)  BP: (!) 180/81 (02/22/25 1153)  SpO2: 97 % (02/22/25 1153) Vital Signs (24h Range):  Temp:  [97.1 °F (36.2 °C)-98.2 °F (36.8 °C)] 97.1 °F (36.2 °C)  Pulse:  [58-77] 75  Resp:  [18] 18  SpO2:  [97 %-100 %] 97 %  BP: (136-197)/(69-95) 180/81     Weight: 90 kg (198 lb 6.6 oz)  Height: 6' 0.84" (185 cm)  Body mass index is 26.3 kg/m².     Physical Exam  Vitals and nursing note reviewed.   Constitutional:       General: He is not in acute distress.     Appearance: He is obese.   Eyes:      General: No scleral icterus.  Pulmonary:      Effort: Pulmonary effort is normal. No respiratory distress.   Musculoskeletal:      Right lower leg: No edema.      Left lower leg: No edema.   Skin:     Coloration: Skin is not jaundiced.   Neurological:      Mental Status: He is alert.          Laboratory:  Labs within the past 24 hours have been reviewed.    Diagnostic Results:  None  "

## 2025-02-22 NOTE — ASSESSMENT & PLAN NOTE
At home on tac, MMF, pred  No change to home IS at this time, continue prior IS on discharge  Monitoring for toxicity

## 2025-02-22 NOTE — NURSING
Patient is ready for discharge. Patient stable alert and oriented X4. IVs removed. No complaints of chest pain, sob, palpitations, dizziness, lightheadedness, headache, or n/v. Discussed discharge plan. Reviewed medications and side effects, appointments, and answered questions with patient and spouse __ RX delivered to patient at bedside by pharmacy.Patient and spouse awaiting patient escort to leave.

## 2025-02-22 NOTE — CONSULTS
[facility] - Kidney Transplant         Kidney Transplant         Consult Note      Impression and recommendations  69 y.o. [place] [facility] [place], also with acute kidney injury    PHUONG on CKD3b in renal transplant allograft  Type of transplant: DDRT 11/2016  Complicated by CKD progress in transplant kidney; low suspicion rejection  Acute kidney injury due to hypotension/hypoperfusion ATI due to syncope  Baseline Cr CKD3, admission Cr 3.0   Urinalysis notable for pending.   Rejection testing and consideration for bx done 12/24, no bx indicated as decline likely due to longstanding uncontrolled HTN (per transplant clinic)  Home IS consist of tacrolimus daily, MMR, prednisone    - PHUONG with possible ATN due to hypotension with syncope, reversed and CV stable currently  - medications reviewed:   -continue home prednisone, tacrolimus, MMF at current dose unless suspicion for infection arises (tacrolimus only 0.5mg DAILY)  -Hold ARB acutely while PHUONG  -antihypertensives per primary; appears euvolemic or close thereto, can hold diuretics for a short period unless fluid overload - per cardiology as well  - monitor tacrolimus 12 hour trough level   - suspect slow decrease in Cr over next several days given hypoperfusion and chronic HF  - Recommend strict intake and output measurement  - daily renal function panel, at primary team discretion  - PHUONG lab studies to include UA, protein microalbumin/cr ratios  - PHUONG imaging: none required unless worsening renal function  - PHUONG renal biopsy indicated?: not at this time  - Echo and EP study per primary given arrhythmia/syncope  - avoid NSAIDs, suggest acetaminophen for pain control  - avoid morphine due to renally cleared metabolites, if opioids required suggest oxycodone or hydromorphone  - avoid hyperglycemia/hypoglycemia  - minimize exposure to iodinated contrast studies and procedures unless emergently indicated; if required suggest prophylaxis with  fluids prior to and following study  - will monitor      Prophylactic Immunotherapy  Drug induced Immunosuppressed State  - this patient remains in immunosuppressed state, at risk of infections, rejection, and toxicity  - monitor for side effects and toxicities given narrow therapeutic window and significant risk of AE. Thus, ongoing monitoring is needed to assess for toxicities and other adverse effects.      Salvatore Fam MD  Nephro PGY4      History of present illness  70 yo M with extensive PMH as below, notably CKD 3-4, history of kidney transplant, chronic combined CHF (FELICIANO 2/3/25 EF 40-45%, TTE 12/8/24 G3DD), CVA right MCA 12/24 with minimal residual LLE and left hand weakness, DM2 A1c 8.6 on 01/27, atrial fib/flutter s/p DCCV 02/03/2025 who presents to the ED with chief complaint of 3 syncopal episodes.     ROS Negative except as indicated in HPI.    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    CKD (chronic kidney disease) stage 4, GFR 15-29 ml/min 07/29/2014    Colon polyps 2014    Depression - situational 07/29/2014    Diabetes type 2 since 2000 07/29/2014    Diabetic neuropathy 07/29/2014    Encounter for blood transfusion     History of cardioversion 10/03/2019    History of hepatitis C, s/p successful Rx w/ SVR24 - 9/2017 07/29/2014    Genotype 1a, treatment naive 10/2014 liver biopsy - grade 1 / stage 1 Completed Harvoni w/ SVR    Hyperlipidemia 07/29/2014    Hypertension     Neuropathy     Organ transplant candidate 07/29/2014    Pancreatitis     S/P cadaveric kidney transplant 11/5/2016. ESRD secondary to HTN/DMII 11/05/2016    Type 2 diabetes mellitus with stage 3a chronic kidney disease, with long-term current use of insulin 07/29/2014     Past Surgical History:   Procedure Laterality Date    ABLATION OF ARRHYTHMOGENIC FOCUS FOR ATRIAL  FIBRILLATION N/A 6/11/2024    Procedure: Ablation atrial fibrillation;  Surgeon: Bronson Bowden MD;  Location: [facility] EP LAB;  Service: Cardiology;  Laterality: N/A;  AF, FELICIANO (cx if SR), PVI, RFA, Carto, Gen, GP, 3 Prep    ABLATION, ATRIAL FLUTTER, TYPICAL  6/11/2024    Procedure: Ablation, Atrial Flutter, Typical;  Surgeon: Bronson Bowden MD;  Location: [facility] EP LAB;  Service: Cardiology;;    APPENDECTOMY      BONE MARROW BIOPSY Left 6/26/2024    Procedure: Biopsy-bone marrow;  Surgeon: Bridger Zapata MD;  Location: [facility] ENDO (4TH FLR);  Service: Oncology;  Laterality: Left;  6/24-pt knows to hold aspirin and eliquis as instructed by hem/onc, precall complete-Kpvt    CARDIOVERSION  6/11/2024    Procedure: Cardioversion;  Surgeon: Bronson Bowden MD;  Location: [facility] EP LAB;  Service: Cardiology;;    CATARACT EXTRACTION W/  INTRAOCULAR LENS IMPLANT Right 3/13/2024    Procedure: EXTRACTION, CATARACT, WITH IOL INSERTION;  Surgeon: Jennifer Palacio MD;  Location: [facility] OR;  Service: Ophthalmology;  Laterality: Right;    CATARACT EXTRACTION W/  INTRAOCULAR LENS IMPLANT Left 4/10/2024    Procedure: EXTRACTION, CATARACT, WITH IOL INSERTION;  Surgeon: Jennifer Palacio MD;  Location: [facility] OR;  Service: Ophthalmology;  Laterality: Left;    COLONOSCOPY N/A 12/21/2020    Procedure: COLONOSCOPY;  Surgeon: Tico Bell MD;  Location: [facility] ENDO (4TH FLR);  Service: Endoscopy;  Laterality: N/A;  ok to hold eliquis per Dr. Valadez, see telephone encounter 11/13/2020-MS  covid test 12/18 [place]    ECHOCARDIOGRAM,TRANSESOPHAGEAL N/A 2/3/2025    Procedure: Transesophageal echo (FELICIANO) intra-procedure log documentation;  Surgeon: Grayson Lin III, MD;  Location: [facility] EP LAB;  Service: Cardiology;  Laterality: N/A;    KIDNEY TRANSPLANT      TRANSESOPHAGEAL ECHOCARDIOGRAPHY N/A 8/26/2019    Procedure: ECHOCARDIOGRAM, TRANSESOPHAGEAL;  Surgeon: [facility] Diagnostic Provider;  Location: [facility] EP LAB;  Service: Cardiology;   Laterality: N/A;    TRANSESOPHAGEAL ECHOCARDIOGRAPHY N/A 6/11/2024    Procedure: ECHOCARDIOGRAM, TRANSESOPHAGEAL;  Surgeon: Grayson Lin III, MD;  Location: [facility] EP LAB;  Service: Cardiology;  Laterality: N/A;    TREATMENT OF CARDIAC ARRHYTHMIA N/A 8/26/2019    Procedure: CARDIOVERSION;  Surgeon: Bronson Bowden MD;  Location: [facility] EP LAB;  Service: Cardiology;  Laterality: N/A;  AF, FELICIANO, DCCV, MAC, GP, 3 PREP    TREATMENT OF CARDIAC ARRHYTHMIA N/A 2/3/2025    Procedure: Cardioversion or Defibrillation;  Surgeon: Bronson Bowden MD;  Location: [facility] EP LAB;  Service: Cardiology;  Laterality: N/A;  AF, FELICIANO, DCCV, MAC, GP, 3 Prep       Family History   Problem Relation Name Age of Onset    Diabetes Mother      Hypertension Mother      Heart disease Mother          CAD with PCI    Heart disease Father      Cancer Brother 2         one sister with breast cancer    Hypertension Brother 2         one sister with HTN and borderline DM    Stroke Neg Hx      Kidney disease Neg Hx      Colon cancer Neg Hx      Esophageal cancer Neg Hx       Social History[1]    Medications  Home:   Prior to Admission medications    Medication Sig Start Date End Date Taking? Authorizing Provider   atorvastatin (LIPITOR) 80 MG tablet Take 1 tablet (80 mg total) by mouth once daily. 12/16/24 12/16/25  Gillies, Connor M, DO   blood sugar diagnostic Strp Use to check blood glucose 1 times daily, to use with insurance preferred meter 5/21/24   Mima Mack MD   blood-glucose meter Misc Use to check blood glucose 1 times daily, to use with insurance preferred meter 5/21/24   Mima Mack MD   blood-glucose sensor Kayla Gin 3, use as directed, change every 14 days , e 11.65 3/15/23   Karan Lopez, APRN, FNP   carvediloL (COREG) 25 MG tablet Take 1 tablet (25 mg total) by mouth 2 (two) times daily. 6/11/24 6/6/25  Lisandro Jauregui MD   doxazosin (CARDURA) 1 MG tablet Take 1 tablet (1 mg total) by mouth every evening.  10/31/24 10/31/25  Francesco Lopez MD   empagliflozin (JARDIANCE) 10 mg tablet Take 1 tablet (10 mg total) by mouth once daily. 1/3/25   Mima Mack MD   ergocalciferol (ERGOCALCIFEROL) 50,000 unit Cap Take 1 capsule (50,000 Units total) by mouth every 7 days. 11/21/24 3/17/25  Karan Lopez APRN, FNP   furosemide (LASIX) 40 MG tablet Take 2 tablets (80 mg total) by mouth 2 (two) times a day. 10/31/24 10/31/25  Francesco Lopez MD   gabapentin (NEURONTIN) 300 MG capsule Take 1 capsule by mouth in the morning, 1 capsule midday, and 3 capsules at night. 8/7/24   Mima Mack MD   glucagon (GVOKE HYPOPEN 2-PACK) 1 mg/0.2 mL AtIn Inject 0.2 mLs into the skin as needed (severe hypoglycemia). 7/23/24   Karan Lopez APRN, FNP   hydrALAZINE (APRESOLINE) 100 MG tablet Take 1 tablet (100 mg total) by mouth every 8 (eight) hours. 10/8/24 10/8/25  Nayely Vital PA-C   hydroCHLOROthiazide (HYDRODIURIL) 12.5 MG Tab Take 1 tablet (12.5 mg total) by mouth once daily. 12/16/24 12/16/25  Gillies, Connor M,    insulin aspart U-100 (NOVOLOG U-100 INSULIN ASPART) 100 unit/mL injection Use in pump, max daily 100 units. 1/31/25   Karan Lopez APRN, FNP   insulin aspart U-100 (NOVOLOG) 100 unit/mL (3 mL) InPn pen Inject 10 units under the skin w/ breakfast, 7 units at lunch and dinner. Scale 180-230+2, 231-280+4, 281-330+6, 331-380+8, >380+10.  Max daily 57 units. 11/21/24   Karan Lopez APRN, FNP   insulin glargine U-100, Lantus, (LANTUS SOLOSTAR U-100 INSULIN) 100 unit/mL (3 mL) InPn pen Inject 20 Units into the skin every morning. 2/10/25   Karan Lopez, REYES, KEO   insulin  cart,aut,G6/7,cntr (OMNIPOD 5 G6-G7 INTRO KT,GEN5,) Crtg Use as directed. 2/10/25   Karan Lopez, REYES, FNP   insulin pump cart,auto,BT,G6/7 (OMNIPOD 5 G6-G7 PODS, GEN 5,) Crtg Change every 48 hours. 2/10/25   Karan Lpoez, REYES, SALOMEP   lancets 30 gauge Misc Use to check blood glucose 1  "times daily, to use with insurance preferred meter 5/21/24   Mima Mack MD   magnesium oxide (MAG-OX) 400 mg (241.3 mg magnesium) tablet Take 1 tablet (400 mg total) by mouth 2 (two) times daily. 5/31/24 5/31/25  Paris Lujan PA-C   meclizine (ANTIVERT) 25 mg tablet Take 1 tablet (25 mg total) by mouth 3 (three) times daily as needed for Dizziness. 3/14/24   Karan Lopez, APRN, FNP   mycophenolate (CELLCEPT) 250 mg Cap Take 2 capsules (500 mg total) by mouth 2 (two) times daily. 6/16/24 6/16/25  Quin Smith MD   pen needle, diabetic (BD ULTRA-FINE LO PEN NEEDLE) 32 gauge x 5/32" Ndle USE TO ADMINISTER INSULIN 4 TIMES DAILY. 1/13/25   Karan Lopez APRN, FNP   predniSONE (DELTASONE) 5 MG tablet Take 1 tablet (5 mg total) by mouth once daily. 12/3/24   Emilia Abreu NP   rivaroxaban (XARELTO) 15 mg Tab Take 1 tablet (15 mg total) by mouth daily with dinner or evening meal. 8/21/24 8/16/25  Mima Mack MD   tacrolimus (PROGRAF) 0.5 MG Cap Take 1 capsule (0.5 mg total) by mouth once daily. Start tomorrow 12/8/24   Lucía Wyatt MD   valsartan (DIOVAN) 320 MG tablet Take 1 tablet (320 mg total) by mouth once daily. 7/10/24 7/5/25  Nayely Vital PA-C   insulin lispro (HUMALOG KWIKPEN INSULIN) 100 unit/mL pen Inject 18 units w/ breakfast, 16 units w/ lunch and dinner plus scale 150-200 +2, 201-250 +4, 251-300 +6, 301-350 +8. 1/25/22 1/2/23  Karan Lopez, APRN, FNP     Scheduled:   atorvastatin  80 mg Oral Daily    doxazosin  1 mg Oral Once    doxazosin  2 mg Oral Daily    electrolytes-dextrose  400 mL Oral Once    gabapentin  100 mg Oral Before breakfast    And    gabapentin  100 mg Oral Daily with lunch    And    gabapentin  300 mg Oral QHS    hydrALAZINE  100 mg Oral Q8H    insulin glargine U-100  10 Units Subcutaneous Daily    mycophenolate  500 mg Oral BID    predniSONE  5 mg Oral Daily    rivaroxaban  15 mg Oral Daily with dinner    [START ON " 2/22/2025] tacrolimus  0.5 mg Sublingual Daily AM     Continuous:  PRN:   Current Facility-Administered Medications:     acetaminophen, 1,000 mg, Oral, Q8H PRN    acetaminophen, 650 mg, Oral, Q4H PRN    aluminum-magnesium hydroxide-simethicone, 30 mL, Oral, QID PRN    dextrose 50%, 12.5 g, Intravenous, PRN    dextrose 50%, 25 g, Intravenous, PRN    glucagon (human recombinant), 1 mg, Intramuscular, PRN    glucose, 16 g, Oral, PRN    glucose, 24 g, Oral, PRN    insulin aspart U-100, 0-5 Units, Subcutaneous, QID (AC + HS) PRN    melatonin, 6 mg, Oral, Nightly PRN    naloxone, 0.02 mg, Intravenous, PRN    ondansetron, 4 mg, Oral, Q6H PRN    polyethylene glycol, 17 g, Oral, Daily PRN    prochlorperazine, 5 mg, Intravenous, Q6H PRN    simethicone, 1 tablet, Oral, QID PRN    sodium chloride 0.9%, 5 mL, Intravenous, PRN    Physical Exam  Vitals  Temp:  [96.5 °F (35.8 °C)-98.4 °F (36.9 °C)] 97.6 °F (36.4 °C)  Pulse:  [60-77] 60  Resp:  [15-22] 18  SpO2:  [98 %-100 %] 98 %  BP: (136-220)/() 136/69    Intake/Output Summary (Last 24 hours) at 2/21/2025 1923  Last data filed at 2/21/2025 1736  Gross per 24 hour   Intake 444 ml   Output --   Net 444 ml       Constitutional: well developed, alert, no acute distress  HENT: NC/AT, mucous membranes moist  Eyes: EOMI, no icterus  Neck: supple, midline  Cardiovascular: regular currently no appreciable murmur, rub, gallop, pulses equal  Pulmonary: Clear bilaterally, no wheezing or rales, normal effort  Abdomen: soft, non distended  MSK: no deformity  Neuro: no focal deficit    Results  Relevant laboratory results, imaging, and diagnostic studies reviewed    Recent Labs     02/20/25  1036 02/20/25  1927 02/21/25  0458   WBC  --  5.58 4.60   HGB 10.7* 10.1* 9.8*   HCT 37.6* 33.6* 32.1*   MCV  --  77* 77*   PLT  --  133* 122*     Recent Labs     02/20/25 1927 02/21/25  0458   * 131*   K 3.0* 4.0   CL 95 94*   CO2 18* 26   BUN 83* 78*   CREATININE 3.0* 2.6*   * 207*  "    Recent Labs     25  0458   CALCIUM 9.5 9.3   MG 2.6 2.5   PHOS  --  3.9     Recent Labs     25  0458   AST 14 16   ALT <5* <5*   ALKPHOS 45 43   BILITOT 0.3 0.3   ALBUMIN 3.5 3.1*     No results for input(s): "PT", "INR", "PTT" in the last 72 hours.  No results for input(s): "TROPONINI" in the last 72 hours.  No results for input(s): "PHART", "GST6WTS" in the last 72 hours.    Invalid input(s): "PO2ABG", "YIT5TYK", "CALCIUMABG", "LACTATEABG"   No results for input(s): "MDC7DCY" in the last 72 hours.    Invalid input(s): "PHVBG", "PO2VBG", "GQA5VHD", "CAVBG", "LACTATEVBG"     Lab Results   Component Value Date    COLORU Yellow 2024    APPEARANCEUA Clear 2024    SPECGRAV 1.020 2024    PROTEINUA 3+ (A) 2024    KETONESU Negative 2024    UROBILINOGEN normal 10/15/2020    NITRITE Negative 2024            [1]   Social History  Socioeconomic History    Marital status:    Occupational History     Employer: [employer]   Tobacco Use    Smoking status: Former     Current packs/day: 0.00     Average packs/day: 0.5 packs/day for 32.0 years (16.0 ttl pk-yrs)     Types: Cigarettes     Start date: 1984     Quit date: 2016     Years since quittin.2    Smokeless tobacco: Never   Substance and Sexual Activity    Alcohol use: Yes     Comment: Pt reports occasional beers on Sundays. Pt reports drinking daily prior to ESRD diagnosis.    Drug use: No    Sexual activity: Yes     Partners: Female   Social History Narrative     for 14 years     for 38 years    2 children ages 41, 42     Social Drivers of Health     Financial Resource Strain: Low Risk  (2025)    Overall Financial Resource Strain (CARDIA)     Difficulty of Paying Living Expenses: Not very hard   Food Insecurity: No Food Insecurity (2025)    Hunger Vital Sign     Worried About Running Out of Food in the Last Year: Never true     Ran Out " of Food in the Last Year: Never true   Transportation Needs: No Transportation Needs (2/21/2025)    PRAPARE - Transportation     Lack of Transportation (Medical): No     Lack of Transportation (Non-Medical): No   Physical Activity: Inactive (2/21/2025)    Exercise Vital Sign     Days of Exercise per Week: 0 days     Minutes of Exercise per Session: 0 min   Stress: No Stress Concern Present (2/21/2025)    [employer] [place] [employer] - Occupational Stress Questionnaire     Feeling of Stress : Not at all   Housing Stability: Low Risk  (2/21/2025)    Housing Stability Vital Sign     Unable to Pay for Housing in the Last Year: No     Homeless in the Last Year: No

## 2025-02-22 NOTE — DISCHARGE SUMMARY
[facility] - Cardiology [facility] Medicine  Discharge Summary      Patient Name: Ravi Zamorano  MRN: [id number]  MARISOL: [id number]  Patient Class: IP- Inpatient  Admission Date: 2/20/2025  Hospital Length of Stay: 1 days  Discharge Date and Time:  02/22/2025 12:47 PM  Attending Physician: Marlee Constantino MD   Discharging Provider: Marlee Andrews MD  Primary Care Provider: Mima Mack MD  [facility] Medicine Team: [facility] Marlee Andrews MD  Primary Care Team: [facility]    HPI:   68 yo M with extensive PMH as below, notably CKD 3-4, history of kidney transplant, chronic combined CHF (FELICIANO 2/3/25 EF 40-45%, TTE 12/8/24 G3DD), CVA right MCA 12/24 with minimal residual LLE and left hand weakness, DM2 A1c 8.6 on 01/27, atrial fib/flutter s/p DCCV 02/03/2025 who presents to the ED with chief complaint of 3 syncopal episodes.  Pt unaware of these episodes and denies any prodrome.  Wife at bedside says that at approx 5:30-6 p.m. day of admission, pt was sitting down talking to his wife when suddenly his head lowered, eyes closed, and pt was unresponsive for 20-30 seconds.  No convulsions, no bowel or bladder incontinence, no tongue biting.  Pt denies prior dizziness, change to vision, diaphoresis, palpitations.  Wife does report significant diaphoresis noted after 1st syncopal event.  Wife says she called EMS during 1st episode.  Pt was awake for a few seconds and had another very similar episode.  Pt recovered after 20-30 seconds and seemed fine.  He stood up and went to the restroom with no problems.  EMS arrived and ultimately, pt had a 3rd episode.  According to ED physician:  EMS reported witnessed pink frothy sputum, intense sweating, dilated pupils, and bradycardia to the 20s.  Transcutaneous paced by EMS at a rate of 60.  Responsive within 25 seconds .  Pt says that he does remember vomiting in the ambulance but denies any associated abdominal pain or nausea.  He also reports intermittent  palpitations since cardioversion on 02/03.  Currently, pt feels at his usual state of health and denies any headache, change to vision, cough, fever, chills, chest pain, palpitations, abdominal pain, nausea, vomiting, diarrhea, constipation, blood in stool or urine, pain with urination, or lower extremity pain or swelling.    * No surgery found *      Hospital Course:   68 yo M with extensive PMH as below, notably CKD 3-4, history of kidney transplant, chronic combined CHF (FELICIANO 2/3/25 EF 40-45%, TTE 12/8/24 G3DD), CVA right MCA 12/24 with minimal residual LLE and left hand weakness, DM2 A1c 8.6 on 01/27, atrial fib/flutter s/p DCCV 02/03/2025 admitted to Hospital Medicine on 2/20 with syncope and bradycardia. EP consulted; recommending holding BB. Hospital course complicated by PHUONG, possibly ATN. KTM consulted, ARB held and he was started on IVFs with stabilization of Cr. KTM okay with discharging patient home, as well as resuming HCTZ and lasix prn. Plan for cardiac event monitor as an outpatient along with EP follow-up. He became hypertensive, hydralazine and doxazosin resumed, still holding ARB. Will need further optimization as an outpatient as BB and ARB are being held. He is medically ready for discharge.    Physical Exam  Cardiovascular:      Rate and Rhythm: Normal rate and regular rhythm.   Pulmonary:      Effort: Pulmonary effort is normal.   Abdominal:      General: Abdomen is flat.   Musculoskeletal:         General: Normal range of motion.   Skin:     General: Skin is warm.   Neurological:      Mental Status: He is alert.         Goals of Care Treatment Preferences:  Code Status: Full Code      SDOH Screening:  The patient was screened for utility difficulties, food insecurity, transport difficulties, housing insecurity, and interpersonal safety and there were no concerns identified this admission.     Consults:   Consults (From admission, onward)          Status Ordering Provider     Inpatient consult  to Kidney/Pancreas Transplant Medicine  Once        Provider:  (Not yet assigned)    Completed LOGAN AGOSTO III     Inpatient consult to Electrophysiology  Once        Provider:  (Not yet assigned)    Completed LOGAN AGOSTO III              Final Active Diagnoses:    Diagnosis Date Noted POA    PRINCIPAL PROBLEM:  Syncope [R55] 2025 Yes    Thrombocytopenia [D69.6] 2025 Yes    Bradycardia [R00.1] 2025 Yes    Acute kidney injury superimposed on chronic kidney disease [N17.9, N18.9] 2025 Yes    Atrial flutter [I48.92] 2025 Yes    Hyponatremia [E87.1] 2025 Yes    Metabolic acidosis [E87.20] 2025 Yes    Anemia of chronic disease [D63.8] 2024 Yes    Status post -donor kidney transplantation [Z94.0] 2024 Not Applicable    Immunosuppressed status [D84.9] 2024 Yes    Persistent atrial fibrillation [I48.19] 2019 Yes    Type 2 diabetes mellitus [E11.9] 2017 Yes    Chronic combined systolic (congestive) and diastolic (congestive) heart failure [I50.42] 10/24/2014 Yes    Uncontrolled hypertension [I10] 2014 Yes      Problems Resolved During this Admission:    Diagnosis Date Noted Date Resolved POA    Hypokalemia [E87.6] 2025 Yes    Elevated troponin [R79.89] 07/10/2022 2025 Yes       Discharged Condition: stable    Disposition: Home or Self Care    Follow Up:    Patient Instructions:      Ambulatory referral/consult to Electrophysiology   Standing Status: Future   Referral Priority: Routine Referral Type: Consultation   Referral Reason: Specialty Services Required   Requested Specialty: Electrophysiology   Number of Visits Requested: 1     Ambulatory referral/consult to Transplant, Kidney   Standing Status: Future   Referral Priority: Routine Referral Type: Transplants   Number of Visits Requested: 1     Ambulatory referral/consult to Internal Medicine   Standing Status: Future   Referral Priority: Routine  Referral Type: Consultation   Referral Reason: Specialty Services Required   Requested Specialty: Internal Medicine   Number of Visits Requested: 1     Diet renal     Diet diabetic     Diet Cardiac     Notify your health care provider if you experience any of the following:  persistent dizziness, light-headedness, or visual disturbances     Notify your health care provider if you experience any of the following:  increased confusion or weakness     Cardiac event monitor   Standing Status: Future Standing Exp. Date: 02/22/26     Order Specific Question Answer Comments   Cardiac Event Monitor Auto Trigger    Release to patient Immediate      Activity as tolerated       Significant Diagnostic Studies: Labs: BMP:   Recent Labs   Lab 02/20/25 1927 02/21/25 0458 02/22/25 0256   * 207* 191*   * 131* 133*   K 3.0* 4.0 3.7   CL 95 94* 99   CO2 18* 26 22*   BUN 83* 78* 73*   CREATININE 3.0* 2.6* 2.6*   CALCIUM 9.5 9.3 9.1   MG 2.6 2.5 2.4    and CMP   Recent Labs   Lab 02/20/25 1927 02/21/25 0458 02/22/25  0256   * 131* 133*   K 3.0* 4.0 3.7   CL 95 94* 99   CO2 18* 26 22*   * 207* 191*   BUN 83* 78* 73*   CREATININE 3.0* 2.6* 2.6*   CALCIUM 9.5 9.3 9.1   PROT 6.6 5.7* 5.7*   ALBUMIN 3.5 3.1* 3.1*   BILITOT 0.3 0.3 0.3   ALKPHOS 45 43 42   AST 14 16 13   ALT <5* <5* <5*   ANIONGAP 16 11 12       Pending Diagnostic Studies:       Procedure Component Value Units Date/Time    Basic metabolic panel [[id number]] Collected: 02/22/25 1204    Order Status: Sent Lab Status: In process Updated: 02/22/25 1235    Specimen: Blood            Medications:  Reconciled Home Medications:      Medication List        CHANGE how you take these medications      doxazosin 2 MG tablet  Commonly known as: CARDURA  Take 1 tablet (2 mg total) by mouth once daily.  Start taking on: February 23, 2025  What changed:   medication strength  how much to take  when to take this     furosemide 40 MG tablet  Commonly known as:  "LASIX  Take 2 tablets (80 mg total) by mouth daily as needed (for swelling).  What changed:   when to take this  reasons to take this     valsartan 320 MG tablet  Commonly known as: DIOVAN  Take 1 tablet (320 mg total) by mouth once daily. HOLD until follow up with kidney doctor  What changed: additional instructions            CONTINUE taking these medications      atorvastatin 80 MG tablet  Commonly known as: LIPITOR  Take 1 tablet (80 mg total) by mouth once daily.     BD ULTRA-FINE LO PEN NEEDLE 32 gauge x 5/32" Ndle  Generic drug: pen needle, diabetic  USE TO ADMINISTER INSULIN 4 TIMES DAILY.     blood-glucose sensor Kayla  Gin 3, use as directed, change every 14 days , e 11.65     empagliflozin 10 mg tablet  Commonly known as: Jardiance  Take 1 tablet (10 mg total) by mouth once daily.     gabapentin 300 MG capsule  Commonly known as: NEURONTIN  Take 1 capsule by mouth in the morning, 1 capsule midday, and 3 capsules at night.     GVOKE HYPOPEN 2-PACK 1 mg/0.2 mL Atin  Generic drug: glucagon  Inject 0.2 mLs into the skin as needed (severe hypoglycemia).     hydrALAZINE 100 MG tablet  Commonly known as: APRESOLINE  Take 1 tablet (100 mg total) by mouth every 8 (eight) hours.     hydroCHLOROthiazide 12.5 MG Tab  Take 1 tablet (12.5 mg total) by mouth once daily.     LANTUS SOLOSTAR U-100 INSULIN 100 unit/mL (3 mL) Inpn pen  Generic drug: insulin glargine U-100 (Lantus)  Inject 20 Units into the skin every morning.     magnesium oxide 400 mg (241.3 mg magnesium) tablet  Commonly known as: MAG-OX  Take 1 tablet (400 mg total) by mouth 2 (two) times daily.     meclizine 25 mg tablet  Commonly known as: ANTIVERT  Take 1 tablet (25 mg total) by mouth 3 (three) times daily as needed for Dizziness.     mycophenolate 250 mg Cap  Commonly known as: CELLCEPT  Take 2 capsules (500 mg total) by mouth 2 (two) times daily.     * NovoLOG Flexpen U-100 Insulin 100 unit/mL (3 mL) Inpn pen  Generic drug: insulin aspart " U-100  Inject 10 units under the skin w/ breakfast, 7 units at lunch and dinner. Scale 180-230+2, 231-280+4, 281-330+6, 331-380+8, >380+10.  Max daily 57 units.     * insulin aspart U-100 100 unit/mL injection  Commonly known as: NovoLOG U-100 Insulin aspart  Use in pump, max daily 100 units.     OMNIPOD 5 G6-G7 INTRO KT(GEN5) Crtg  Generic drug: insulin  cart,aut,G6/7,cntr  Use as directed.     OMNIPOD 5 G6-G7 PODS (GEN 5) Crtg  Generic drug: insulin pump cart,auto,BT,G6/7  Change every 48 hours.     predniSONE 5 MG tablet  Commonly known as: DELTASONE  Take 1 tablet (5 mg total) by mouth once daily.     tacrolimus 0.5 MG Cap  Commonly known as: PROGRAF  Take 1 capsule (0.5 mg total) by mouth once daily. Start tomorrow     TRUE METRIX GLUCOSE METER Misc  Generic drug: blood-glucose meter  Use to check blood glucose 1 times daily, to use with insurance preferred meter     TRUE METRIX GLUCOSE TEST STRIP Strp  Generic drug: blood sugar diagnostic  Use to check blood glucose 1 times daily, to use with insurance preferred meter     TRUEPLUS LANCETS 30 gauge Misc  Generic drug: lancets  Use to check blood glucose 1 times daily, to use with insurance preferred meter     VITAMIN D2 1,250 mcg (50,000 unit) capsule  Generic drug: ergocalciferol  Take 1 capsule (50,000 Units total) by mouth every 7 days.     XARELTO 15 mg Tab  Generic drug: rivaroxaban  Take 1 tablet (15 mg total) by mouth daily with dinner or evening meal.           * This list has 2 medication(s) that are the same as other medications prescribed for you. Read the directions carefully, and ask your doctor or other care provider to review them with you.                STOP taking these medications      carvediloL 25 MG tablet  Commonly known as: COREG              Indwelling Lines/Drains at time of discharge:   Lines/Drains/Airways       None                   Time spent on the discharge of patient: 35 minutes       Marlee Constantino MD  Department of  [facility] Medicine  [facility] - Cardiology Stepdown

## 2025-02-22 NOTE — SUBJECTIVE & OBJECTIVE
Interval History: NAEON  Telemetry with no arrhythmia     Review of Systems   All other systems reviewed and are negative.    Objective:     Vital Signs (Most Recent):  Temp: 97.9 °F (36.6 °C) (02/22/25 0803)  Pulse: 74 (02/22/25 0806)  Resp: 18 (02/22/25 0803)  BP: (!) 185/91 (02/22/25 0806)  SpO2: 99 % (02/22/25 0803) Vital Signs (24h Range):  Temp:  [97.6 °F (36.4 °C)-98.2 °F (36.8 °C)] 97.9 °F (36.6 °C)  Pulse:  [58-77] 74  Resp:  [18] 18  SpO2:  [98 %-100 %] 99 %  BP: (136-197)/(69-95) 185/91     Weight: 90 kg (198 lb 6.6 oz)  Body mass index is 26.3 kg/m².     SpO2: 99 %        Physical Exam  HENT:      Head: Normocephalic.      Mouth/Throat:      Mouth: Mucous membranes are moist.   Eyes:      Pupils: Pupils are equal, round, and reactive to light.   Cardiovascular:      Rate and Rhythm: Normal rate and regular rhythm.      Heart sounds: Normal heart sounds, S1 normal and S2 normal.   Pulmonary:      Effort: Pulmonary effort is normal.   Abdominal:      General: Abdomen is flat.   Musculoskeletal:         General: Normal range of motion.      Right lower leg: No edema.      Left lower leg: No edema.   Skin:     General: Skin is warm.   Neurological:      Mental Status: He is alert.            Significant Labs: EP:   Recent Labs   Lab 02/20/25  1927 02/21/25  0458 02/22/25  0256   * 131* 133*   K 3.0* 4.0 3.7   CL 95 94* 99   CO2 18* 26 22*   * 207* 191*   BUN 83* 78* 73*   CREATININE 3.0* 2.6* 2.6*   CALCIUM 9.5 9.3 9.1   PROT 6.6 5.7* 5.7*   ALBUMIN 3.5 3.1* 3.1*   BILITOT 0.3 0.3 0.3   ALKPHOS 45 43 42   AST 14 16 13   ALT <5* <5* <5*   ANIONGAP 16 11 12   WBC 5.58 4.60 3.47*   HGB 10.1* 9.8* 9.9*   HCT 33.6* 32.1* 34.0*   * 122* 117*

## 2025-02-22 NOTE — PROGRESS NOTES
[facility] - Cardiology Stepdown  Cardiac Electrophysiology  Progress Note    Admission Date: 2/20/2025  Code Status: Full Code   Attending Physician: Marlee Constantino MD   Expected Discharge Date: 2/23/2025  Principal Problem:Syncope    Subjective:     Interval History: NAEON  Telemetry with no arrhythmia     Review of Systems   All other systems reviewed and are negative.    Objective:     Vital Signs (Most Recent):  Temp: 97.9 °F (36.6 °C) (02/22/25 0803)  Pulse: 74 (02/22/25 0806)  Resp: 18 (02/22/25 0803)  BP: (!) 185/91 (02/22/25 0806)  SpO2: 99 % (02/22/25 0803) Vital Signs (24h Range):  Temp:  [97.6 °F (36.4 °C)-98.2 °F (36.8 °C)] 97.9 °F (36.6 °C)  Pulse:  [58-77] 74  Resp:  [18] 18  SpO2:  [98 %-100 %] 99 %  BP: (136-197)/(69-95) 185/91     Weight: 90 kg (198 lb 6.6 oz)  Body mass index is 26.3 kg/m².     SpO2: 99 %        Physical Exam  HENT:      Head: Normocephalic.      Mouth/Throat:      Mouth: Mucous membranes are moist.   Eyes:      Pupils: Pupils are equal, round, and reactive to light.   Cardiovascular:      Rate and Rhythm: Normal rate and regular rhythm.      Heart sounds: Normal heart sounds, S1 normal and S2 normal.   Pulmonary:      Effort: Pulmonary effort is normal.   Abdominal:      General: Abdomen is flat.   Musculoskeletal:         General: Normal range of motion.      Right lower leg: No edema.      Left lower leg: No edema.   Skin:     General: Skin is warm.   Neurological:      Mental Status: He is alert.            Significant Labs: EP:   Recent Labs   Lab 02/20/25  1927 02/21/25  0458 02/22/25  0256   * 131* 133*   K 3.0* 4.0 3.7   CL 95 94* 99   CO2 18* 26 22*   * 207* 191*   BUN 83* 78* 73*   CREATININE 3.0* 2.6* 2.6*   CALCIUM 9.5 9.3 9.1   PROT 6.6 5.7* 5.7*   ALBUMIN 3.5 3.1* 3.1*   BILITOT 0.3 0.3 0.3   ALKPHOS 45 43 42   AST 14 16 13   ALT <5* <5* <5*   ANIONGAP 16 11 12   WBC 5.58 4.60 3.47*   HGB 10.1* 9.8* 9.9*   HCT 33.6* 32.1* 34.0*   * 122* 117*          Assessment and Plan:     * Syncope  Pt with Hx of persistent AF on coreg 25 mg BID. S/p FELICIANO/DCCV 2/3/25. Reported episode of syncope x3 prompting EMS call. EMS reported witnessed pink frothy sputum, intense sweating, dilated pupils, and bradycardia to the 20s. Transcutaneous paced by EMS at a rate of 60.    Tele reviewed: AF with slow ventricular rate.    Recommendations  - Syncope with notable bradycardia likely iatrogenic, from BB  - Hold all BB for now   - Continue with tele monitor  - Will consider 30 days cardiac event monitor prior to discharge       Persistent atrial fibrillation  Pt with persistent AF. With multiple FELICIANO/DCCV (8/2019, 5/2023, 2/2025).   - S/p PVI with LA posterior wall isolation, CTI/PWI ablation 6/11/2024 Dr. Meade. HV interval post ablation 53ms.  - Tele: AF rate controlled     Recommendations   - Continue with xarelto 15 mg qd   - hold coreg 25 mg BID   - DC with cardiac 30 day event monitor   - Follow up EP clinic outpt for evaluation of ablation    Will sign off             Rg Mcginnis MD  Cardiac Electrophysiology  [facility] - Cardiology Stepdown

## 2025-02-22 NOTE — PROGRESS NOTES
[facility] - Cardiology Stepdown  Kidney Transplant  Progress Note      Reason for Follow-up: Reassessment of Kidney Transplant - 11/5/2016  (#1) recipient and management of immunosuppression.     ORGAN:   RIGHT KIDNEY          Subjective:   History of Present Illness:  No notes on file    Mr. Zamorano is a 69 y.o. year old male who is status post Kidney Transplant - 11/5/2016  (#1).    His maintenance immunosuppression consists of:   Immunosuppressants (From admission, onward)      Start     Stop Route Frequency Ordered    02/22/25 0800  tacrolimus (PROGRAF) capsule (SUBLINGUAL) 0.5 mg         -- SL Every morning 02/21/25 1538    02/20/25 2315  mycophenolate capsule 500 mg         -- Oral 2 times daily 02/20/25 2301            Hospital Course:  No notes on file    Interval History:  Creatinine improved. Patient feels well. Workup for arrhythmia syncope on continuing discharge.    Past Medical, Surgical, Family, and Social History:   Unchanged from H&P.    Scheduled Meds:   atorvastatin  80 mg Oral Daily    doxazosin  2 mg Oral Daily    electrolytes-dextrose  400 mL Oral Once    gabapentin  100 mg Oral Before breakfast    And    gabapentin  100 mg Oral Daily with lunch    And    gabapentin  300 mg Oral QHS    hydrALAZINE  100 mg Oral Q8H    insulin glargine U-100  10 Units Subcutaneous Daily    mycophenolate  500 mg Oral BID    predniSONE  5 mg Oral Daily    rivaroxaban  15 mg Oral Daily with dinner    sodium chloride 0.9%  500 mL Intravenous Once    tacrolimus  0.5 mg Sublingual Daily AM     Continuous Infusions:  PRN Meds:  Current Facility-Administered Medications:     acetaminophen, 1,000 mg, Oral, Q8H PRN    acetaminophen, 650 mg, Oral, Q4H PRN    aluminum-magnesium hydroxide-simethicone, 30 mL, Oral, QID PRN    dextrose 50%, 12.5 g, Intravenous, PRN    dextrose 50%, 25 g, Intravenous, PRN    glucagon (human recombinant), 1 mg, Intramuscular, PRN    glucose, 16 g, Oral, PRN    glucose, 24 g, Oral, PRN    insulin  "aspart U-100, 0-5 Units, Subcutaneous, QID (AC + HS) PRN    melatonin, 6 mg, Oral, Nightly PRN    naloxone, 0.02 mg, Intravenous, PRN    ondansetron, 4 mg, Oral, Q6H PRN    polyethylene glycol, 17 g, Oral, Daily PRN    prochlorperazine, 5 mg, Intravenous, Q6H PRN    simethicone, 1 tablet, Oral, QID PRN    sodium chloride 0.9%, 5 mL, Intravenous, PRN    Intake/Output - Last 3 Shifts         02/20 0700  02/21 0659 02/21 0700 02/22 0659 02/22 0700  02/23 0659    P.O.  684 462    Total Intake(mL/kg)  684 (7.6) 462 (5.1)    Urine (mL/kg/hr)   225 (0.4)    Total Output   225    Net  +684 +237           Urine Occurrence  2 x              Review of Systems   Constitutional:  Negative for fever.   Respiratory:  Negative for shortness of breath.    Cardiovascular:  Negative for leg swelling.   Gastrointestinal:  Negative for abdominal pain.   Genitourinary:  Negative for decreased urine volume.   Neurological:  Negative for weakness.      Objective:     Vital Signs (Most Recent):  Temp: 97.1 °F (36.2 °C) (02/22/25 1153)  Pulse: 75 (02/22/25 1153)  Resp: 18 (02/22/25 1153)  BP: (!) 180/81 (02/22/25 1153)  SpO2: 97 % (02/22/25 1153) Vital Signs (24h Range):  Temp:  [97.1 °F (36.2 °C)-98.2 °F (36.8 °C)] 97.1 °F (36.2 °C)  Pulse:  [58-77] 75  Resp:  [18] 18  SpO2:  [97 %-100 %] 97 %  BP: (136-197)/(69-95) 180/81     Weight: 90 kg (198 lb 6.6 oz)  Height: 6' 0.84" (185 cm)  Body mass index is 26.3 kg/m².     Physical Exam  Vitals and nursing note reviewed.   Constitutional:       General: He is not in acute distress.     Appearance: He is obese.   Eyes:      General: No scleral icterus.  Pulmonary:      Effort: Pulmonary effort is normal. No respiratory distress.   Musculoskeletal:      Right lower leg: No edema.      Left lower leg: No edema.   Skin:     Coloration: Skin is not jaundiced.   Neurological:      Mental Status: He is alert.          Laboratory:  Labs within the past 24 hours have been reviewed.    Diagnostic " Results:  None  Assessment/Plan:     * Syncope  Reflex vs cardiac  Per primary      Acute kidney injury superimposed on chronic kidney disease  Likely from LECV from combined use of loop and thiazide diuretic for extended period of time at the same time as having a syncopal even likely triggered from reflex or cardiac arrhythmia  Unlikely rejection  improving      Status post -donor kidney transplantation  2016  Current PHUONG likely from LECV from the same cause as the syncope and is improving  Unlikely rejection  Keep on home IS      Immunosuppressed status  At home on tac, MMF, pred  No change to home IS at this time, continue prior IS on discharge  Monitoring for toxicity       Chronic combined systolic (congestive) and diastolic (congestive) heart failure  Improved and no edema currently and no shortness of breat    Uncontrolled hypertension  Recommend patient use furosemide prn instead of scheduled on discharge  Recommend resumption of schedule thiazide on discharge              Osmin Owens Jr., MD  Kidney Transplant  [facility] - Cardiology Stepdown

## 2025-02-22 NOTE — ASSESSMENT & PLAN NOTE
Likely from LECV from combined use of loop and thiazide diuretic for extended period of time at the same time as having a syncopal even likely triggered from reflex or cardiac arrhythmia  Unlikely rejection  improving

## 2025-02-22 NOTE — PLAN OF CARE
Plan of care discussed with patient. Patient ambulating with assist, fall precautions in place. Patient has no complaints of pain. Discussed medications and care. Patient has no questions at this time. Call light and personal belongings within reach. Wife at bedside.

## 2025-02-22 NOTE — ASSESSMENT & PLAN NOTE
2016  Current PHUONG likely from LECV from the same cause as the syncope and is improving  Unlikely rejection  Keep on home IS

## 2025-02-22 NOTE — ASSESSMENT & PLAN NOTE
Pt with persistent AF. With multiple FELICIANO/DCCV (8/2019, 5/2023, 2/2025).   - S/p PVI with LA posterior wall isolation, CTI/PWI ablation 6/11/2024 Dr. Meade. HV interval post ablation 53ms.  - Tele: AF rate controlled     Recommendations   - Continue with xarelto 15 mg qd   - hold coreg 25 mg BID   - DC with cardiac 30 day event monitor   - Follow up EP clinic outpt for evaluation of ablation    Will sign off

## 2025-02-22 NOTE — ASSESSMENT & PLAN NOTE
Recommend patient use furosemide prn instead of scheduled on discharge  Recommend resumption of schedule thiazide on discharge

## 2025-02-24 ENCOUNTER — DOCUMENTATION ONLY (OUTPATIENT)
Dept: CARDIOLOGY | Facility: HOSPITAL | Age: 70
End: 2025-02-24
Payer: MEDICARE

## 2025-02-24 NOTE — PROGRESS NOTES
Patient wearing 30 day event monitor for diagnosis syncope     Received auto-triggered alert notification for new onset AF on February 23, 2025 at 12: 41 PM     Pt has hx of persistent AF and is taking Xarelto.    Will continue to monitor until 3/25/2025.

## 2025-02-25 ENCOUNTER — PATIENT OUTREACH (OUTPATIENT)
Dept: ADMINISTRATIVE | Facility: CLINIC | Age: 70
End: 2025-02-25
Payer: MEDICARE

## 2025-02-25 NOTE — PROGRESS NOTES
C3 nurse spoke with Ravi Zamorano,  for a TCC post hospital discharge follow up call. The patient has a scheduled HOSFU appointment with Mima Mack MD on 3/7/2025 @ 10AM.

## 2025-02-26 ENCOUNTER — RESULTS FOLLOW-UP (OUTPATIENT)
Dept: EMERGENCY MEDICINE | Facility: HOSPITAL | Age: 70
End: 2025-02-26

## 2025-02-26 DIAGNOSIS — Z78.9 HEPATITIS C ANTIBODY TEST NEGATIVE: Primary | ICD-10-CM

## 2025-03-01 DIAGNOSIS — E83.42 HYPOMAGNESEMIA: ICD-10-CM

## 2025-03-01 RX ORDER — LANOLIN ALCOHOL/MO/W.PET/CERES
400 CREAM (GRAM) TOPICAL 2 TIMES DAILY
Qty: 60 TABLET | Refills: 6 | Status: CANCELLED | OUTPATIENT
Start: 2025-03-01 | End: 2026-03-01

## 2025-03-07 ENCOUNTER — LAB VISIT (OUTPATIENT)
Dept: LAB | Facility: HOSPITAL | Age: 70
End: 2025-03-07
Attending: INTERNAL MEDICINE
Payer: MEDICARE

## 2025-03-07 ENCOUNTER — OFFICE VISIT (OUTPATIENT)
Dept: INTERNAL MEDICINE | Facility: CLINIC | Age: 70
End: 2025-03-07
Payer: MEDICARE

## 2025-03-07 VITALS
WEIGHT: 200.94 LBS | HEIGHT: 73 IN | DIASTOLIC BLOOD PRESSURE: 80 MMHG | SYSTOLIC BLOOD PRESSURE: 144 MMHG | OXYGEN SATURATION: 100 % | HEART RATE: 74 BPM | BODY MASS INDEX: 26.63 KG/M2

## 2025-03-07 DIAGNOSIS — I10 ESSENTIAL HYPERTENSION: ICD-10-CM

## 2025-03-07 DIAGNOSIS — Z79.4 TYPE 2 DIABETES MELLITUS WITH STAGE 3B CHRONIC KIDNEY DISEASE, WITH LONG-TERM CURRENT USE OF INSULIN: ICD-10-CM

## 2025-03-07 DIAGNOSIS — I48.0 PAROXYSMAL A-FIB: ICD-10-CM

## 2025-03-07 DIAGNOSIS — I50.32 CHRONIC DIASTOLIC CONGESTIVE HEART FAILURE: ICD-10-CM

## 2025-03-07 DIAGNOSIS — N18.32 STAGE 3B CHRONIC KIDNEY DISEASE: ICD-10-CM

## 2025-03-07 DIAGNOSIS — R00.1 BRADYCARDIA: ICD-10-CM

## 2025-03-07 DIAGNOSIS — N18.32 STAGE 3B CHRONIC KIDNEY DISEASE: Primary | ICD-10-CM

## 2025-03-07 DIAGNOSIS — N18.32 TYPE 2 DIABETES MELLITUS WITH STAGE 3B CHRONIC KIDNEY DISEASE, WITH LONG-TERM CURRENT USE OF INSULIN: ICD-10-CM

## 2025-03-07 DIAGNOSIS — E11.22 TYPE 2 DIABETES MELLITUS WITH STAGE 3B CHRONIC KIDNEY DISEASE, WITH LONG-TERM CURRENT USE OF INSULIN: ICD-10-CM

## 2025-03-07 LAB
ALBUMIN SERPL BCP-MCNC: 3.4 G/DL (ref 3.5–5.2)
ANION GAP SERPL CALC-SCNC: 12 MMOL/L (ref 8–16)
BUN SERPL-MCNC: 39 MG/DL (ref 8–23)
CALCIUM SERPL-MCNC: 8.8 MG/DL (ref 8.7–10.5)
CHLORIDE SERPL-SCNC: 98 MMOL/L (ref 95–110)
CO2 SERPL-SCNC: 24 MMOL/L (ref 23–29)
CREAT SERPL-MCNC: 2.5 MG/DL (ref 0.5–1.4)
EST. GFR  (NO RACE VARIABLE): 27.5 ML/MIN/1.73 M^2
GLUCOSE SERPL-MCNC: 160 MG/DL (ref 70–110)
PHOSPHATE SERPL-MCNC: 3.2 MG/DL (ref 2.7–4.5)
POTASSIUM SERPL-SCNC: 3.3 MMOL/L (ref 3.5–5.1)
SODIUM SERPL-SCNC: 134 MMOL/L (ref 136–145)

## 2025-03-07 PROCEDURE — 99999 PR PBB SHADOW E&M-EST. PATIENT-LVL III: CPT | Mod: PBBFAC,HCNC,, | Performed by: INTERNAL MEDICINE

## 2025-03-07 PROCEDURE — 80069 RENAL FUNCTION PANEL: CPT | Mod: HCNC | Performed by: INTERNAL MEDICINE

## 2025-03-07 PROCEDURE — 36415 COLL VENOUS BLD VENIPUNCTURE: CPT | Mod: HCNC | Performed by: INTERNAL MEDICINE

## 2025-03-07 NOTE — PROGRESS NOTES
"Subjective:        Chief Complaint   Patient presents with    Follow-up     Hopsfu heart rate low         Patient ID: Ravi Zamorano is a 69 y.o. male.    HPI  Ravi Zamorano is a 69 y.o. male with medical diagnoses as listed in the medical history and problem list that presents for above complaint(s). He was admitted to the hospital on 2/20 after four syncopal episodes where he was found to have a heart rate in the 20s. He was transcutaneously paced by EMS to the 60s. He was discharged from the hospital on 2/22 with a Holter monitor in place for 30 days. He is scheduled to follow up with cardiology. Whilst inpatient, his carvedilol and valsartan were stopped. He also complained of a painless lump on his left chest wall.     ROS   Constitutional:  Negative for fever.   Respiratory:  Negative for shortness of breath.    Cardiovascular:  Negative for chest pain.   Musculoskeletal: Negative.    Skin: Negative.     The following sections were updated/reviewed and documented in the electronic medical record: Past Medical History, Past Surgical History, Social History, Family History, Allergies and Medications    Objective:     Physical Exam  Vitals:    03/07/25 0959   BP: (!) 144/80   BP Location: Left arm   Patient Position: Sitting   Pulse: 74   SpO2: 100%   Weight: 91.2 kg (200 lb 15.2 oz)   Height: 6' 0.84" (1.85 m)    Body mass index is 26.63 kg/m².  Weight: 91.2 kg (200 lb 15.2 oz)   Height: 6' 0.84" (185 cm)   Physical Exam    Physical Exam  Constitutional:       General: She is not in acute distress.     Appearance: She is well-developed. She is not diaphoretic.   HENT:      Head: Normocephalic and atraumatic.   Cardiovascular:      Rate and Rhythm: Normal rate and regular rhythm.   Pulmonary:      Effort: Pulmonary effort is normal. No respiratory distress.      Breath sounds: No wheezing or rales.   Skin:     General: Skin is warm and dry.   Neurological:      Mental Status: She is alert and oriented to " person, place, and time.   Psychiatric:         Behavior: Behavior normal.   3cm soft rubbery left upper chest consistent with lipoma  Assessment and Plan:     1. Stage 3b chronic kidney disease    2. Chronic diastolic congestive heart failure    3. Paroxysmal A-fib    4. Bradycardia    5. Type 2 diabetes mellitus with stage 3b chronic kidney disease, with long-term current use of insulin    6. Essential hypertension        Stage 3b chronic kidney disease  -     RENAL FUNCTION PANEL; Future  Follow up with nephrology  Consult nephrology about restarting valsartan    2. Chronic diastolic congestive heart failure  -     Ambulatory referral/consult to Internal Medicine  -     Continue Lasix as needed, Jardiance,    3. Paroxysmal A-fib  -    Carvedilol stopped due to bradycardia  -    Still on Rivaroxaban 15 mg     4. Bradycardia  -    Wearing Holter for 30 days  -    Follow up with cardiology, appreciate their input  -    Carvedilol stopped    5. Type 2 diabetes mellitus with stage 3b chronic kidney disease, with long-term current use of insulin  -     RENAL FUNCTION PANEL; Future  -     Continue Novolog sliding scale and Lantus 20 mg     6. Essential hypertension  -     Continue to monitor at home, elevated today in clinic  -     Follow up with nephrology about restarting valsartan  -     Continue Cardura and hydrochlorothiazide    7. Lipoma on left chest wall  -      Patient advised that it is benign and requires not treatment  -      Cautioned that if the lump appears to grow or becomes painful, to see medical attention  -      Surgery is an option if desired  -      Ultrasound offered but declined    Health Maintenance reviewed, addressed as per orders    The patient expressed understanding and no barriers to adherence were identified.     Ledy Betancourt  [employer] MS-4    I hereby acknowledge that I am relying upon documentation authored by a medical student working under my  supervision and further I hereby attest that I have verified the student documentation or findings by personally performing the physical exam and medical decision making activities of the Evaluation and Management service to be billed.      Mima Mack MD

## 2025-03-07 NOTE — PROGRESS NOTES
Lump on left chest    Cr 2.4 on 2/22/25    Lab Results   Component Value Date    HGBA1C 8.6 (H) 01/27/2025    HGBA1C 8.1 (H) 12/07/2024    HGBA1C 7.4 (H) 11/19/2024       3cm soft rubbery left upper chest lipoma

## 2025-03-08 ENCOUNTER — RESULTS FOLLOW-UP (OUTPATIENT)
Dept: INTERNAL MEDICINE | Facility: CLINIC | Age: 70
End: 2025-03-08

## 2025-03-11 ENCOUNTER — OFFICE VISIT (OUTPATIENT)
Dept: CARDIOLOGY | Facility: CLINIC | Age: 70
End: 2025-03-11
Payer: MEDICARE

## 2025-03-11 VITALS
WEIGHT: 202.38 LBS | HEIGHT: 73 IN | DIASTOLIC BLOOD PRESSURE: 70 MMHG | HEART RATE: 90 BPM | OXYGEN SATURATION: 100 % | BODY MASS INDEX: 26.82 KG/M2 | SYSTOLIC BLOOD PRESSURE: 180 MMHG

## 2025-03-11 DIAGNOSIS — N18.32 STAGE 3B CHRONIC KIDNEY DISEASE: ICD-10-CM

## 2025-03-11 DIAGNOSIS — E78.2 MIXED HYPERLIPIDEMIA: ICD-10-CM

## 2025-03-11 DIAGNOSIS — I48.19 PERSISTENT ATRIAL FIBRILLATION: ICD-10-CM

## 2025-03-11 DIAGNOSIS — N18.31 TYPE 2 DIABETES MELLITUS WITH STAGE 3A CHRONIC KIDNEY DISEASE, WITH LONG-TERM CURRENT USE OF INSULIN: ICD-10-CM

## 2025-03-11 DIAGNOSIS — Z86.73 HISTORY OF CVA (CEREBROVASCULAR ACCIDENT): ICD-10-CM

## 2025-03-11 DIAGNOSIS — E11.22 TYPE 2 DIABETES MELLITUS WITH STAGE 3A CHRONIC KIDNEY DISEASE, WITH LONG-TERM CURRENT USE OF INSULIN: ICD-10-CM

## 2025-03-11 DIAGNOSIS — Z79.4 TYPE 2 DIABETES MELLITUS WITH STAGE 3A CHRONIC KIDNEY DISEASE, WITH LONG-TERM CURRENT USE OF INSULIN: ICD-10-CM

## 2025-03-11 DIAGNOSIS — I73.9 PAD (PERIPHERAL ARTERY DISEASE): ICD-10-CM

## 2025-03-11 DIAGNOSIS — I50.42 CHRONIC COMBINED SYSTOLIC (CONGESTIVE) AND DIASTOLIC (CONGESTIVE) HEART FAILURE: Primary | ICD-10-CM

## 2025-03-11 DIAGNOSIS — Z94.0 STATUS POST DECEASED-DONOR KIDNEY TRANSPLANTATION: ICD-10-CM

## 2025-03-11 DIAGNOSIS — I10 ESSENTIAL HYPERTENSION: ICD-10-CM

## 2025-03-11 DIAGNOSIS — I48.92 ATRIAL FLUTTER, UNSPECIFIED TYPE: ICD-10-CM

## 2025-03-11 PROBLEM — I48.0 PAROXYSMAL A-FIB: Status: RESOLVED | Noted: 2023-12-19 | Resolved: 2025-03-11

## 2025-03-11 PROCEDURE — 1101F PT FALLS ASSESS-DOCD LE1/YR: CPT | Mod: HCNC,CPTII,S$GLB, | Performed by: PHYSICIAN ASSISTANT

## 2025-03-11 PROCEDURE — 4010F ACE/ARB THERAPY RXD/TAKEN: CPT | Mod: HCNC,CPTII,S$GLB, | Performed by: PHYSICIAN ASSISTANT

## 2025-03-11 PROCEDURE — 1159F MED LIST DOCD IN RCRD: CPT | Mod: HCNC,CPTII,S$GLB, | Performed by: PHYSICIAN ASSISTANT

## 2025-03-11 PROCEDURE — 1160F RVW MEDS BY RX/DR IN RCRD: CPT | Mod: HCNC,CPTII,S$GLB, | Performed by: PHYSICIAN ASSISTANT

## 2025-03-11 PROCEDURE — 3052F HG A1C>EQUAL 8.0%<EQUAL 9.0%: CPT | Mod: HCNC,CPTII,S$GLB, | Performed by: PHYSICIAN ASSISTANT

## 2025-03-11 PROCEDURE — 3008F BODY MASS INDEX DOCD: CPT | Mod: HCNC,CPTII,S$GLB, | Performed by: PHYSICIAN ASSISTANT

## 2025-03-11 PROCEDURE — 3078F DIAST BP <80 MM HG: CPT | Mod: HCNC,CPTII,S$GLB, | Performed by: PHYSICIAN ASSISTANT

## 2025-03-11 PROCEDURE — 3077F SYST BP >= 140 MM HG: CPT | Mod: HCNC,CPTII,S$GLB, | Performed by: PHYSICIAN ASSISTANT

## 2025-03-11 PROCEDURE — 99214 OFFICE O/P EST MOD 30 MIN: CPT | Mod: HCNC,S$GLB,, | Performed by: PHYSICIAN ASSISTANT

## 2025-03-11 PROCEDURE — 99999 PR PBB SHADOW E&M-EST. PATIENT-LVL V: CPT | Mod: PBBFAC,HCNC,, | Performed by: PHYSICIAN ASSISTANT

## 2025-03-11 PROCEDURE — 1126F AMNT PAIN NOTED NONE PRSNT: CPT | Mod: HCNC,CPTII,S$GLB, | Performed by: PHYSICIAN ASSISTANT

## 2025-03-11 PROCEDURE — 1111F DSCHRG MED/CURRENT MED MERGE: CPT | Mod: HCNC,CPTII,S$GLB, | Performed by: PHYSICIAN ASSISTANT

## 2025-03-11 PROCEDURE — 3288F FALL RISK ASSESSMENT DOCD: CPT | Mod: HCNC,CPTII,S$GLB, | Performed by: PHYSICIAN ASSISTANT

## 2025-03-11 RX ORDER — DOXAZOSIN 4 MG/1
4 TABLET ORAL DAILY
Qty: 90 TABLET | Refills: 3 | Status: SHIPPED | OUTPATIENT
Start: 2025-03-11 | End: 2026-03-11

## 2025-03-11 NOTE — PATIENT INSTRUCTIONS
Increase Doxazosin to 4 mg daily     -Limiting your sodium (salt) intake is a key part of your treatment. You should eat no more than 2 grams (2000 mgm) of salt a day. DO NOT USE TABLE SALT! JUST ONE TEASPOON OF TABLE SALT CONTAINS ABOUT 2 GRAMS OF SODIUM. Sodium is found in many canned, pickled foods. Look at the food label. Use caution when eating out. Read the menu carefully and ask if food can be cooked without salt.     -Restrict your fluids to 1500 ml/day. This is about 48 ounces. This INCLUDES all fluids taken with your medications, at mealtime and between Meals. Include things like ice cream, pudding, soup, ice, jello and even fruits in your daily fluid total. Keep a record of your daily fluid intake and bring to your next appointment with you.     -Avoid all alcohol and use of tobacco products.     -Weigh yourself every morning using the same scale around the same time. A sudden weight gain is one sign that you might be retaining fluids. If you notice you have a WEIGHT GAIN of TWO or THREE pounds overnight or FIVE pounds in a week, take an extra fluid pill and contact your cardiologist.    -Regular exercise is important; spending long periods of time in bed can weaken you. Start activity slowly and progress little by little. Allow time to adjust. Don't overdo it. Take rest from becoming too tired.    -Exercise at the time of day when you are at your best energy level. Exercise only when feeling well. Wait 2 days after a cold or flu. Don't exercise vigorously after eating. Wait at least 2 hours. Choose appropriate exercise. Aerobic exercise should be included. Flexibility and strengthening exercises should also be considered for a well-rounded program.     -Call your doctor if you:   1. Have problems breathing at night.  2. Need to use more pillows or have to sit up to breathe at night

## 2025-03-11 NOTE — PROGRESS NOTES
General Cardiology Clinic Note  Reason for Visit: Hospital follow up   Last Clinic Visit: 12/16/2024 with Dr. Gillies    HPI:   Ravi Zamorano is a 69 y.o. male who presents for hospital follow up     Problems:  Atrial fibrillation s/p CTI/PVI/PWI 6/11/2024  HFmrEF  Hypertension  Hyperlipidemia   PAD  IDDM  Hx of CVA  s/p kidney transplant 2016   CKD Stage 3b  Anemia    Interval HPI  Since his last visit, he underwent DCCV on 2/3/25. He was then admitted 2/20-2/22 after four syncopal episodes where he was found to be in AF with slow vent rate in the 20s. He was transcutaneously paced by EMS to the 60s.EP consulted; recommending holding BB. Hospital course complicated by PHUONG. KTM consulted, ARB held and he was started on IVFs with stabilization of Cr. He was restarted on HCTZ and lasix prn. Plan for cardiac event monitor as an outpatient along with EP follow-up. He became hypertensive, hydralazine and doxazosin resumed, still holding ARB and BB.     He presents for follow up. Feeling okay since hospitalization. He has minimal dependent edema. He denies CP, DOSS, orthopnea, PND, syncope, near syncope, palpitations. Weight stable at 180 lbs at home, although scale seems wrong since his weight in clinic has been stable at 200 lbs. He is on a low sodium diet. He is checking his BP at home and it is running 120s-140s systolic.     12/16/2024 HPI (Dr. Gillies)  Ravi Zamorano is a 69 y.o. male with a hx of HTN, HLD, paroxysmal AF on Xarelto (renally dosed), s/p CTI/PVI/PWI on 6/11/24, HFpEF, PAD, T2DM c/b peripheral neuropathy, s/p kidney txp on 11/5/2016, now with CKD3b of his transplanted kidney, anemia, and recent admission for embolic stroke of the R MCA who is here for follow up after hospitalization. He previously had low normal EF (55-60%) and was found to have an EF of 45-50% during the admission. There was frequent ectopy during the echo. He was not in decompensated HF during his admission. He was  discharged from the hospital on 12/8/24.      He still has some left upper extremity weakness.      His only other symptom he complains of is shortness of breath. He is short of breath after walking 1/2-1 block on flat ground or very short distance with incline. He is limited with many physical activities due to DOSS. He has some leg swelling as well.      The patient denies any chest discomfort including with exertion, SOB at rest, orthopnea, PND, palpitations, lightheadedness, pre syncope or syncope.      He was previously on Eliquis but switched to Xarelto due to insurance coverage issues (not a recent change). His CrCl is consistently below 50 (best was 46, 3mos ago) and Xarelto is appropriately dosed.     He is compliant with his meds including Xarelto with meals every day.     10/8/2024 hospitals  Patient presents for 3 month follow up. He is feeling okay overall. He may have some mild DOSS, but not limiting. He still has edema and is taking a second dose of Lasix sometimes. He is on a low sodium diet. He denies orthopnea, PND. He denies syncope, chest pain, HA, vision changes, palpitations, syncope, lightheadedness. He is not exercising. His BP cuff at home does not seem accurate.     7/10/2024 hospitals  Patient presents for 3 week follow up. He is feeling better since last visit. Swelling has improved significantly. He will still get some mild edema in ankles on and off, but no longer staying swollen. He denies DOSS, orthopnea, PND, CP, palpitations, lightheadedness, syncope. He does have some claudication in L calf with ambulation, but can walk at least 2 blocks.     6/19/2024 hospitals  Patient underwent RFA 6/11/2024. He was started on Multaq. He was then admitted 6/15-6/16 for ADHF. He was diuresed with IV Lasix. He was discharged on higher dose of Lasix and restarted on Spironolactone.     Patient presents for follow up. States he is not feeling any better. He still has leg swelling. He has been very sedentary since  ablation, so unclear if he is still having dyspnea. He denies orthopnea and PND. His weight at home is up a couple pounds since hospital discharge. He reports being on a low sodium diet.     5/21/2024  Patient was admitted 5/2-5/3 for ADHF. Presented to the ED with DOSS, orthopnea, edema. In ED, /96. CXR with left sided pleural effusion. BNP >4,900. Troponin 0.049. Hgb 7.9 (at baseline for April). Required brief IV diuresis with improvement. BP was uncontrolled during the hospital possibly contributing to volume overload.  Changes were made to his medications including up titration of valsartan and Coreg, HCTZ was discontinued.  TTE was obtained.  Was in atrial fibrillation during study, which showed EF 50-55%. Unable to assess diastolic function due to atrial fibrillation.      Patient presents for follow up. He still has mild edema. He reports DOSS with mild exertion, such as walking short distances or taking out the trash. He has to walk very slowly. Symptoms are significantly improved from when he presented to the ED, but not normal for him. He denies chest pain, orthopnea, PND, lightheadedness, syncope. He reports palpitations. He is not doing daily weights or checking BP consistently at home. He has two BP cuffs but they give completely different readings. He is on a low sodium diet. He was not discharged on Lasix after the hospitalization, so he was just prescribed it about a week ago. He is also supposed to be taking Coreg 50 mg bid, but is on 25 mg bid.     ROS:      Review of Systems   Constitutional: Negative for diaphoresis, malaise/fatigue, weight gain and weight loss.   HENT:  Negative for nosebleeds.    Eyes:  Negative for vision loss in left eye, vision loss in right eye and visual disturbance.   Cardiovascular:  Positive for leg swelling. Negative for chest pain, claudication, dyspnea on exertion, irregular heartbeat, near-syncope, orthopnea, palpitations, paroxysmal nocturnal dyspnea and  syncope.   Respiratory:  Negative for cough, shortness of breath, sleep disturbances due to breathing, snoring and wheezing.    Hematologic/Lymphatic: Negative for bleeding problem. Does not bruise/bleed easily.   Skin:  Negative for poor wound healing and rash.   Musculoskeletal:  Negative for muscle cramps and myalgias.   Gastrointestinal:  Negative for bloating, abdominal pain, diarrhea, heartburn, melena, nausea and vomiting.   Genitourinary:  Negative for hematuria and nocturia.   Neurological:  Negative for brief paralysis, dizziness, headaches, light-headedness, numbness and weakness.   Psychiatric/Behavioral:  Negative for depression.    Allergic/Immunologic: Negative for hives.       PMH:     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    CKD (chronic kidney disease) stage 4, GFR 15-29 ml/min 07/29/2014    Colon polyps 2014    Depression - situational 07/29/2014    Diabetes type 2 since 2000 07/29/2014    Diabetic neuropathy 07/29/2014    Encounter for blood transfusion     History of cardioversion 10/03/2019    History of hepatitis C, s/p successful Rx w/ SVR24 - 9/2017 07/29/2014    Genotype 1a, treatment naive 10/2014 liver biopsy - grade 1 / stage 1 Completed Harvoni w/ SVR    Hyperlipidemia 07/29/2014    Hypertension     Neuropathy     Organ transplant candidate 07/29/2014    Pancreatitis     S/P cadaveric kidney transplant 11/5/2016. ESRD secondary to HTN/DMII 11/05/2016    Type 2 diabetes mellitus with stage 3a chronic kidney disease, with long-term current use of insulin 07/29/2014     Past Surgical History:   Procedure Laterality Date    ABLATION OF ARRHYTHMOGENIC FOCUS FOR ATRIAL FIBRILLATION N/A 6/11/2024    Procedure: Ablation atrial fibrillation;  Surgeon: Bronson Bowden MD;  Location: [facility];  Service: Cardiology;  Laterality:  N/A;  AF, FELICIANO (cx if SR), PVI, RFA, Carto, Gen, GP, 3 Prep    ABLATION, ATRIAL FLUTTER, TYPICAL  6/11/2024    Procedure: Ablation, Atrial Flutter, Typical;  Surgeon: Bronson Bowden MD;  Location: [facility] EP LAB;  Service: Cardiology;;    APPENDECTOMY      BONE MARROW BIOPSY Left 6/26/2024    Procedure: Biopsy-bone marrow;  Surgeon: Bridger Zapata MD;  Location: [facility] ENDO (4TH FLR);  Service: Oncology;  Laterality: Left;  6/24-pt knows to hold aspirin and eliquis as instructed by hem/onc, precall complete-Kpvt    CARDIOVERSION  6/11/2024    Procedure: Cardioversion;  Surgeon: Bronson Bowden MD;  Location: [facility] EP LAB;  Service: Cardiology;;    CATARACT EXTRACTION W/  INTRAOCULAR LENS IMPLANT Right 3/13/2024    Procedure: EXTRACTION, CATARACT, WITH IOL INSERTION;  Surgeon: Jennifer Palacio MD;  [facility];  Service: Ophthalmology;  Laterality: Right;    CATARACT EXTRACTION W/  INTRAOCULAR LENS IMPLANT Left 4/10/2024    Procedure: EXTRACTION, CATARACT, WITH IOL INSERTION;  Surgeon: Jennifer Palacio MD;  [facility];  Service: Ophthalmology;  Laterality: Left;    COLONOSCOPY N/A 12/21/2020    Procedure: COLONOSCOPY;  Surgeon: Tico Bell MD;  Location: [facility] ENDO (4TH FLR);  Service: Endoscopy;  Laterality: N/A;  ok to hold eliquis per Dr. Valadez, see telephone encounter 11/13/2020-MS  covid test 12/18 [place]    ECHOCARDIOGRAM,TRANSESOPHAGEAL N/A 2/3/2025    Procedure: Transesophageal echo (FELICIANO) intra-procedure log documentation;  Surgeon: Grayson Lin III, MD;  Location: [facility] EP LAB;  Service: Cardiology;  Laterality: N/A;    KIDNEY TRANSPLANT      TRANSESOPHAGEAL ECHOCARDIOGRAPHY N/A 8/26/2019    Procedure: ECHOCARDIOGRAM, TRANSESOPHAGEAL;  Surgeon: [facility];  Location: [facility] EP LAB;  Service: Cardiology;  Laterality: N/A;    TRANSESOPHAGEAL ECHOCARDIOGRAPHY N/A 6/11/2024    Procedure: ECHOCARDIOGRAM, TRANSESOPHAGEAL;  Surgeon: Grayson Lin III, MD;  Location: [facility]  EP LAB;  Service: Cardiology;  Laterality: N/A;    TREATMENT OF CARDIAC ARRHYTHMIA N/A 8/26/2019    Procedure: CARDIOVERSION;  Surgeon: Bronson Bowden MD;  Location: [facility] EP LAB;  Service: Cardiology;  Laterality: N/A;  AF, FELICIANO, DCCV, MAC, GP, 3 PREP    TREATMENT OF CARDIAC ARRHYTHMIA N/A 2/3/2025    Procedure: Cardioversion or Defibrillation;  Surgeon: Bronson Bowden MD;  Location: [facility] EP LAB;  Service: Cardiology;  Laterality: N/A;  AF, FELICIANO, DCCV, MAC, GP, 3 Prep     Allergies:     Review of patient's allergies indicates:   Allergen Reactions    Nifedipine Other (See Comments)     Gingival hyperplasia     Medications:     Current Outpatient Medications on File Prior to Visit   Medication Sig Dispense Refill    atorvastatin (LIPITOR) 80 MG tablet Take 1 tablet (80 mg total) by mouth once daily. 90 tablet 3    blood sugar diagnostic Strp Use to check blood glucose 1 times daily, to use with insurance preferred meter 100 each 11    blood-glucose meter Misc Use to check blood glucose 1 times daily, to use with insurance preferred meter 1 each 0    blood-glucose sensor Kayla Gin 3, use as directed, change every 14 days , e 11.65 2 each 11    doxazosin (CARDURA) 2 MG tablet Take 1 tablet (2 mg total) by mouth once daily. 90 tablet 3    empagliflozin (JARDIANCE) 10 mg tablet Take 1 tablet (10 mg total) by mouth once daily. 90 tablet 0    ergocalciferol (ERGOCALCIFEROL) 50,000 unit Cap Take 1 capsule (50,000 Units total) by mouth every 7 days. 4 capsule 6    furosemide (LASIX) 40 MG tablet Take 2 tablets (80 mg total) by mouth daily as needed (for swelling).      gabapentin (NEURONTIN) 300 MG capsule Take 1 capsule by mouth in the morning, 1 capsule midday, and 3 capsules at night. 450 capsule 3    glucagon (GVOKE HYPOPEN 2-PACK) 1 mg/0.2 mL AtIn Inject 0.2 mLs into the skin as needed (severe hypoglycemia). 0.4 mL 2    hydrALAZINE (APRESOLINE) 100 MG tablet Take 1 tablet (100 mg total) by mouth every 8 (eight) hours.  "270 tablet 3    hydroCHLOROthiazide (HYDRODIURIL) 12.5 MG Tab Take 1 tablet (12.5 mg total) by mouth once daily. 30 tablet 11    insulin aspart U-100 (NOVOLOG U-100 INSULIN ASPART) 100 unit/mL injection Use in pump, max daily 100 units.      insulin aspart U-100 (NOVOLOG) 100 unit/mL (3 mL) InPn pen Inject 10 units under the skin w/ breakfast, 7 units at lunch and dinner. Scale 180-230+2, 231-280+4, 281-330+6, 331-380+8, >380+10.  Max daily 57 units. 30 mL 11    insulin glargine U-100, Lantus, (LANTUS SOLOSTAR U-100 INSULIN) 100 unit/mL (3 mL) InPn pen Inject 20 Units into the skin every morning. 15 mL 6    insulin  cart,aut,G6/7,cntr (OMNIPOD 5 G6-G7 INTRO KT,GEN5,) Crtg Use as directed. 1 each 1    insulin pump cart,auto,BT,G6/7 (OMNIPOD 5 G6-G7 PODS, GEN 5,) Crtg Change every 48 hours. 45 each 3    lancets 30 gauge Misc Use to check blood glucose 1 times daily, to use with insurance preferred meter 100 each 3    magnesium oxide (MAG-OX) 400 mg (241.3 mg magnesium) tablet Take 1 tablet (400 mg total) by mouth 2 (two) times daily. 60 tablet 6    meclizine (ANTIVERT) 25 mg tablet Take 1 tablet (25 mg total) by mouth 3 (three) times daily as needed for Dizziness. 21 tablet 3    mycophenolate (CELLCEPT) 250 mg Cap Take 2 capsules (500 mg total) by mouth 2 (two) times daily. 120 capsule 11    pen needle, diabetic (BD ULTRA-FINE LO PEN NEEDLE) 32 gauge x 5/32" Ndle USE TO ADMINISTER INSULIN 4 TIMES DAILY. 400 each 3    predniSONE (DELTASONE) 5 MG tablet Take 1 tablet (5 mg total) by mouth once daily. 30 tablet 11    rivaroxaban (XARELTO) 15 mg Tab Take 1 tablet (15 mg total) by mouth daily with dinner or evening meal. 30 tablet 11    tacrolimus (PROGRAF) 0.5 MG Cap Take 1 capsule (0.5 mg total) by mouth once daily. Start tomorrow 30 capsule 11    valsartan (DIOVAN) 320 MG tablet Take 1 tablet (320 mg total) by mouth once daily. HOLD until follow up with kidney doctor 90 tablet 3    [DISCONTINUED] insulin lispro " "(HUMALOG KWIKPEN INSULIN) 100 unit/mL pen Inject 18 units w/ breakfast, 16 units w/ lunch and dinner plus scale 150-200 +2, 201-250 +4, 251-300 +6, 301-350 +8. 30 mL 4     Current Facility-Administered Medications on File Prior to Visit   Medication Dose Route Frequency Provider Last Rate Last Admin    balanced salt irrigation intra-ocular solution 1 drop  1 drop Right Eye On Call Procedure Jennifer Palacio MD        epoetin tanya injection 4,000 Units  4,000 Units Subcutaneous Q7 Days         phenylephrine HCL 10% ophthalmic solution 1 drop  1 drop Right Eye PRN Jennifer Palacio MD        proparacaine 0.5 % ophthalmic solution 1 drop  1 drop Right Eye Daily PRN Jennifer Palacio MD        sodium chloride 0.9% flush 10 mL  10 mL Intravenous PRN Jennifer Palacio MD        TETRAcaine HCl (PF) 0.5 % Drop 1 drop  1 drop Right Eye PRN Jennifer Palacio MD         Social History:     Social History     Tobacco Use    Smoking status: Former     Current packs/day: 0.00     Average packs/day: 0.5 packs/day for 32.0 years (16.0 ttl pk-yrs)     Types: Cigarettes     Start date: 1984     Quit date: 2016     Years since quittin.2    Smokeless tobacco: Never   Substance Use Topics    Alcohol use: Yes     Comment: Pt reports occasional beers on Sundays. Pt reports drinking daily prior to ESRD diagnosis.     Family History:     Family History   Problem Relation Name Age of Onset    Diabetes Mother      Hypertension Mother      Heart disease Mother          CAD with PCI    Heart disease Father      Cancer Brother 2         one sister with breast cancer    Hypertension Brother 2         one sister with HTN and borderline DM    Stroke Neg Hx      Kidney disease Neg Hx      Colon cancer Neg Hx      Esophageal cancer Neg Hx       Physical Exam:   BP (!) 180/70   Pulse 90   Ht 6' 0.84" (1.85 m)   Wt 91.8 kg (202 lb 6.1 oz)   SpO2 100%   BMI 26.82 kg/m²        Physical Exam  Vitals and nursing note reviewed. "   Constitutional:       General: He is not in acute distress.     Appearance: Normal appearance.   HENT:      Head: Normocephalic and atraumatic.      Nose: Nose normal.   Eyes:      General: No scleral icterus.     Extraocular Movements: Extraocular movements intact.      Conjunctiva/sclera: Conjunctivae normal.   Neck:      Thyroid: No thyromegaly.      Vascular: No carotid bruit, hepatojugular reflux or JVD.   Cardiovascular:      Rate and Rhythm: Normal rate and regular rhythm. Occasional Extrasystoles are present.     Pulses: Normal pulses.      Heart sounds: Normal heart sounds. No murmur heard.     No friction rub. No gallop.   Pulmonary:      Effort: Pulmonary effort is normal.      Breath sounds: Normal breath sounds. No wheezing, rhonchi or rales.   Chest:      Chest wall: No tenderness.   Abdominal:      General: Bowel sounds are normal. There is no distension.      Palpations: Abdomen is soft.      Tenderness: There is no abdominal tenderness.   Musculoskeletal:      Cervical back: Neck supple.      Right lower leg: Pitting Edema (trace) present.      Left lower leg: Pitting Edema (trace) present.   Skin:     General: Skin is warm and dry.      Coloration: Skin is not pale.      Findings: No erythema or rash.      Nails: There is no clubbing.   Neurological:      Mental Status: He is alert and oriented to person, place, and time.   Psychiatric:         Mood and Affect: Mood and affect normal.         Behavior: Behavior normal.          Labs:     Lab Results   Component Value Date     (L) 03/07/2025    K 3.3 (L) 03/07/2025    CL 98 03/07/2025    CO2 24 03/07/2025    BUN 39 (H) 03/07/2025    CREATININE 2.5 (H) 03/07/2025    ANIONGAP 12 03/07/2025     Lab Results   Component Value Date    HGBA1C 8.6 (H) 01/27/2025     Lab Results   Component Value Date    BNP 1,055 (H) 02/20/2025     (H) 06/25/2024    BNP 3,203 (H) 06/15/2024    Lab Results   Component Value Date    WBC 3.47 (L) 02/22/2025     HGB 9.9 (L) 02/22/2025    HCT 34.0 (L) 02/22/2025    HCT 30 (L) 06/15/2024     (L) 02/22/2025    GRAN 3.9 02/20/2025    GRAN 69.3 02/20/2025     Lab Results   Component Value Date    CHOL 172 12/07/2024    HDL 30 (L) 12/07/2024    LDLCALC 105.4 12/07/2024    TRIG 183 (H) 12/07/2024          Imaging:   Echocardiograms:   TTE 2/21/25    Left Ventricle: There is mild concentric hypertrophy. There is low normal systolic function with a visually estimated ejection fraction of 50 - 55%. Grade III diastolic dysfunction.    Left Ventricle: The left ventricle is at the upper limits of normal in size. Mildly increased wall thickness. There is mild concentric hypertrophy. Regional wall motion abnormalities present. See diagram for wall motion findings. There is low normal systolic function with a visually estimated ejection fraction of 50 - 55%. Ejection fraction is approximately 53%. Grade III diastolic dysfunction.    Right Ventricle: Systolic function is borderline low despite the low TAPSE.    Left Atrium: Left atrium is moderately dilated.    Aortic Valve: The aortic valve is a trileaflet valve. There is mild aortic valve sclerosis. There is moderate annular calcification present. There is mild aortic regurgitation with a centrally directed jet.    Mitral Valve: There is mild regurgitation with a centrally directed jet.    Pericardium: There is a trivial posterior effusion and under the RA.    Tricuspid Valve: There is mild regurgitation.    Pulmonary Artery: The estimated pulmonary artery systolic pressure is 30 mmHg.    FELICIANO 2/3/25    FELICIANO performed prior to DCCV.    Left Atrium: The left atrial appendage has a cauliflower morphology. Appendage velocity is reduced at less than 40 cm/sec. There is no thrombus in the left atrial appendage. The pulmonary veins are normal in size with systolic blunting.    Left Ventricle: The left ventricle is normal in size. Normal wall thickness. There is mildly reduced systolic  function with a visually estimated ejection fraction of 40 - 45%.    Right Ventricle: Normal right ventricular cavity size. Systolic function is normal.    Aortic Valve: There is mild aortic regurgitation.    Mitral Valve: There is mild regurgitation.    Tricuspid Valve: There is mild regurgitation.    Aorta: Ascending aorta is mildly dilated measuring 3.9 cm.    TTE 12/7/2024    Left Ventricle: The left ventricle is normal in size. Ventricular mass is normal. Normal wall thickness. There is mildly reduced systolic function with a visually estimated ejection fraction of 45 - 50%. Grade III diastolic dysfunction. LV function may be undererstimated in the setting of frequent ectopy    Right Ventricle: Normal right ventricular cavity size. Wall thickness is normal. Right ventricle wall motion  is normal. Systolic function is mildly reduced.    Left Atrium: Left atrium is mildly dilated.    Aortic Valve: There is mild to moderate aortic regurgitation with a centrally directed jet.    Mitral Valve: There is moderate regurgitation with a centrally directed jet.    Tricuspid Valve: There is mild to moderate regurgitation with a centrally directed jet.    Pulmonary Artery: There is severe pulmonary hypertension. The estimated pulmonary artery systolic pressure is 70 mmHg.    IVC/SVC: Normal venous pressure at 3 mmHg.    Pericardium: There is no pericardial effusion. Left pleural effusion.    TTE 6/16/2024    Left Ventricle: The left ventricle is normal in size. Ventricular mass is normal. Mild septal thickening. Regional wall motion abnormalities present. See diagram for wall motion findings. Septal motion is abnormal. There is normal systolic function with a visually estimated ejection fraction of 55 - 60%. Ejection fraction by visual approximation is 58%.    Right Ventricle: Normal right ventricular cavity size. Wall thickness is normal. Systolic function is normal.    Tricuspid Valve: There is mild regurgitation.     Pulmonary Artery: There is severe pulmonary hypertension. The estimated pulmonary artery systolic pressure is 72 mmHg.    IVC/SVC: Normal venous pressure at 3 mmHg.    Pericardium: There is a small posterior effusion at base and none to trivial otherwise.    TTE 5/3/2024    Irregularly irregular rhythm was present during the study    Left Ventricle: The left ventricle is normal in size. Ventricular mass is normal. Normal wall thickness. Normal wall motion. There is low normal systolic function with a visually estimated ejection fraction of 50 - 55%. Unable to assess diastolic function due to atrial fibrillation. Singnificant beat to beat variabilty due  to AF.    Left Ventricle: Unable to assess diastolic function due to atrial fibrillation. Normal left ventricular filling pressure.    Right Ventricle: Normal right ventricular cavity size. Wall thickness is normal. Systolic function is normal.    Left Atrium: Left atrium is severely dilated.    Right Atrium: Right atrium is mildly dilated.    Aortic Valve: The aortic valve is a bicuspid valve. There is mild aortic valve sclerosis. There is mild annular calcification present. There is mild aortic regurgitation.    Mitral Valve: There is mild regurgitation.    Aorta: Aortic root is mildly dilated measuring 3.69 cm. Ascending aorta is normal measuring 3.44 cm.    Pulmonary Artery: The estimated pulmonary artery systolic pressure is 42 mmHg.    IVC/SVC: Intermediate venous pressure at 8 mmHg.    Pericardium: There is a small posterior effusion. No indication of cardiac tamponade. Left pleural effusion.    TTE 7/11/2022  The left ventricle is normal in size with eccentric hypertrophy and normal systolic function. The estimated ejection fraction is 65%.  Normal right ventricular size with normal right ventricular systolic function.  Left ventricular diastolic dysfunction.  Biatrial enlargement.  Mild aortic regurgitation.  PA systolic pressure is at elast 39 mmHg. The IVC  is not visualized.    Stress Tests:   Nuclear stress test 5/15/2023    Normal myocardial perfusion scan. There is no evidence of myocardial ischemia or infarction.    The visually estimated ejection fraction is normal at rest and normal during stress.    There is normal wall motion at rest and post stress.    LV cavity size is normal at rest and normal at stress.    The ECG portion of the study is abnormal but not diagnostic due to resting ST-T abnormalities.    The patient reported no chest pain during the stress test.    There were no arrhythmias during stress.    There are no prior studies for comparison.    Caths:   None    Other:  Lower extremity arterial ultrasound 2/1/2024    Right resting WENDI 0.65, is suggestive of moderate right lower extremity arterial disease.    There is a short-segment occlusion at the mid SFA, followed by high-grade stenosis in the distal segment.    The right anterior tibial artery is occluded in the mid segment.    Left resting WENDI 0.70, is suggestive of moderate left lower extremity arterial disease.    The left SFA is occluded in the proximal/mid segment, with distal reconstitution.    The left anterior tibial artery is occluded in the mid segment.    48 Hour Holter Monitor 4/17/2023  Baseline rhythm was sinus bradycardia with first degree AV block and an average heart rate of 55 bpm.  There were no reported symptoms.  There were very rare PVCs with an overall PVC burden of 0.1%. There were frequent PACs with an overall PAC burden of 1.4%.  There were asymptomatic pauses of up to 2.1 seconds in duration during sleep with no evidence of high-degree AV block.  There were no atrial or ventricular arrhythmias.    ABIs 12/9/2022  Moderately decreased right WENDI, 0.69.    Mildly decreased left WENDI, 0.71.    Moderately dampened PVR waveforms bilaterally.      Assessment:     1. Chronic combined systolic (congestive) and diastolic (congestive) heart failure    2. Persistent atrial  fibrillation    3. Atrial flutter, unspecified type    4. Essential hypertension    5. Mixed hyperlipidemia    6. PAD (peripheral artery disease)    7. Type 2 diabetes mellitus with stage 3a chronic kidney disease, with long-term current use of insulin    8. Status post -donor kidney transplantation    9. Stage 3b chronic kidney disease    10. History of CVA (cerebrovascular accident)      Plan:     Chronic CHF with mildly reduced EF  NYHA Class 1-2 symptoms. Appears compensated on exam.   Continue Jardiance 10 mg daily   Continue Lasix 80 mg PRN   Continue low sodium diet    Persistent Afib s/p RFA  He is back in AF/AFL on recent EKGs. He is currently wearing an event monitor for recent episodes of syncope. He has a f/u with EP next month.   Coreg on hold given concern for syncope due to bradycardia  Continue Xarelto 15 mg with dinner. He has a hx of cardioembolic CVA despite compliance with Xarelto. He may benefit from Watchman?    Hypertension  Uncontrolled  Increase Doxazosin to 4 mg daily   Continue Hydralazine 100 mg tid  Continue HCTZ 12.5 mg daily   ARB on hold due to PHUONG. He needs to f/u with Nephrology to see if he can resume it    BB on hold due to latha arrhythmia and syncope  Has allergy to Nifedipine. Caused gingival hyperplasia.     Hyperlipidemia   Continue Lipitor 40 mg daily     PAD  Following with Dr. Gottlieb    S/p kidney transplant  CKD Stage 3/4  Cr 2.5 on recent labs. Continue to hold ARB for now.     Type 2 DM   Uncontrolled. A1c 8.6%. Management per [facility]    Follow up in 3 months    Signed:  Nayely Vital PA-C  Cardiology   [phone] - General

## 2025-03-16 ENCOUNTER — HOSPITAL ENCOUNTER (INPATIENT)
Facility: HOSPITAL | Age: 70
LOS: 3 days | Discharge: HOME | DRG: 194 | End: 2025-03-19
Attending: EMERGENCY MEDICINE | Admitting: HOSPITALIST
Payer: MEDICARE
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
## 2025-03-16 DIAGNOSIS — Z94.0 HISTORY OF RENAL TRANSPLANTATION: ICD-10-CM

## 2025-03-16 DIAGNOSIS — J18.9 HOSPITAL-ACQUIRED PNEUMONIA: ICD-10-CM

## 2025-03-16 DIAGNOSIS — Z13.6 SCREENING FOR CARDIOVASCULAR CONDITION: ICD-10-CM

## 2025-03-16 DIAGNOSIS — R07.9 CHEST PAIN: ICD-10-CM

## 2025-03-16 DIAGNOSIS — Z94.0 KIDNEY REPLACED BY TRANSPLANT: ICD-10-CM

## 2025-03-16 DIAGNOSIS — D84.9 IMMUNOSUPPRESSED STATUS: ICD-10-CM

## 2025-03-16 DIAGNOSIS — J18.9 PNEUMONIA OF RIGHT UPPER LOBE DUE TO INFECTIOUS ORGANISM: Primary | ICD-10-CM

## 2025-03-16 DIAGNOSIS — E83.42 HYPOMAGNESEMIA: ICD-10-CM

## 2025-03-16 PROBLEM — R00.0 WIDE-COMPLEX TACHYCARDIA: Status: ACTIVE | Noted: 2025-03-16

## 2025-03-16 LAB
ADENOVIRUS: NOT DETECTED
ALBUMIN SERPL BCP-MCNC: 3.1 G/DL (ref 3.5–5.2)
ALP SERPL-CCNC: 43 U/L (ref 40–150)
ALT SERPL W/O P-5'-P-CCNC: <5 U/L (ref 10–44)
ANION GAP SERPL CALC-SCNC: 12 MMOL/L (ref 8–16)
ANION GAP SERPL CALC-SCNC: 12 MMOL/L (ref 8–16)
AST SERPL-CCNC: 9 U/L (ref 10–40)
BACTERIA #/AREA URNS AUTO: NORMAL /HPF
BASOPHILS # BLD AUTO: 0.02 K/UL (ref 0–0.2)
BASOPHILS NFR BLD: 0.1 % (ref 0–1.9)
BILIRUB SERPL-MCNC: 0.3 MG/DL (ref 0.1–1)
BILIRUB UR QL STRIP: NEGATIVE
BIPAP: 0
BNP SERPL-MCNC: 1766 PG/ML (ref 0–99)
BORDETELLA PARAPERTUSSIS (IS1001): NOT DETECTED
BORDETELLA PERTUSSIS (PTXP): NOT DETECTED
BUN SERPL-MCNC: 33 MG/DL (ref 8–23)
BUN SERPL-MCNC: 39 MG/DL (ref 8–23)
CALCIUM SERPL-MCNC: 7.3 MG/DL (ref 8.7–10.5)
CALCIUM SERPL-MCNC: 8.3 MG/DL (ref 8.7–10.5)
CHLAMYDIA PNEUMONIAE: NOT DETECTED
CHLORIDE SERPL-SCNC: 103 MMOL/L (ref 95–110)
CHLORIDE SERPL-SCNC: 99 MMOL/L (ref 95–110)
CLARITY UR REFRACT.AUTO: CLEAR
CO2 SERPL-SCNC: 17 MMOL/L (ref 23–29)
CO2 SERPL-SCNC: 20 MMOL/L (ref 23–29)
COLOR UR AUTO: YELLOW
CORONAVIRUS 229E, COMMON COLD VIRUS: NOT DETECTED
CORONAVIRUS HKU1, COMMON COLD VIRUS: NOT DETECTED
CORONAVIRUS NL63, COMMON COLD VIRUS: NOT DETECTED
CORONAVIRUS OC43, COMMON COLD VIRUS: NOT DETECTED
CORRECTED TEMPERATURE (PCO2): 33.6 MMHG
CORRECTED TEMPERATURE (PH): 7.48
CORRECTED TEMPERATURE (PO2): 35.4 MMHG
CREAT SERPL-MCNC: 2.2 MG/DL (ref 0.5–1.4)
CREAT SERPL-MCNC: 2.5 MG/DL (ref 0.5–1.4)
DIFFERENTIAL METHOD BLD: ABNORMAL
EOSINOPHIL # BLD AUTO: 0 K/UL (ref 0–0.5)
EOSINOPHIL NFR BLD: 0.1 % (ref 0–8)
ERYTHROCYTE [DISTWIDTH] IN BLOOD BY AUTOMATED COUNT: 16.2 % (ref 11.5–14.5)
EST. GFR  (NO RACE VARIABLE): 27.5 ML/MIN/1.73 M^2
EST. GFR  (NO RACE VARIABLE): 32 ML/MIN/1.73 M^2
FIO2: 21 %
FLUBV RNA NPH QL NAA+NON-PROBE: NOT DETECTED
GLUCOSE SERPL-MCNC: 197 MG/DL (ref 70–110)
GLUCOSE SERPL-MCNC: 222 MG/DL (ref 70–110)
GLUCOSE SERPL-MCNC: 274 MG/DL (ref 70–110)
GLUCOSE SERPL-MCNC: 309 MG/DL (ref 70–110)
GLUCOSE UR QL STRIP: ABNORMAL
HCT VFR BLD AUTO: 32.7 % (ref 40–54)
HCT VFR BLD CALC: 31.5 %
HGB BLD-MCNC: 9.9 G/DL (ref 14–18)
HGB UR QL STRIP: NEGATIVE
HPIV1 RNA NPH QL NAA+NON-PROBE: NOT DETECTED
HPIV2 RNA NPH QL NAA+NON-PROBE: NOT DETECTED
HPIV3 RNA NPH QL NAA+NON-PROBE: NOT DETECTED
HPIV4 RNA NPH QL NAA+NON-PROBE: NOT DETECTED
HUMAN METAPNEUMOVIRUS: NOT DETECTED
HYALINE CASTS UR QL AUTO: 0 /LPF
IMM GRANULOCYTES # BLD AUTO: 0.21 K/UL (ref 0–0.04)
IMM GRANULOCYTES NFR BLD AUTO: 1.5 % (ref 0–0.5)
INFLUENZA A, MOLECULAR: NEGATIVE
INFLUENZA A: NOT DETECTED
INFLUENZA B, MOLECULAR: NEGATIVE
KETONES UR QL STRIP: NEGATIVE
LDH SERPL L TO P-CCNC: 1.4 MMOL/L (ref 0.5–2.2)
LEUKOCYTE ESTERASE UR QL STRIP: NEGATIVE
LYMPHOCYTES # BLD AUTO: 0.3 K/UL (ref 1–4.8)
LYMPHOCYTES NFR BLD: 1.8 % (ref 18–48)
MAGNESIUM SERPL-MCNC: 1.8 MG/DL (ref 1.6–2.6)
MCH RBC QN AUTO: 23.1 PG (ref 27–31)
MCHC RBC AUTO-ENTMCNC: 30.3 G/DL (ref 32–36)
MCV RBC AUTO: 76 FL (ref 82–98)
MICROSCOPIC COMMENT: NORMAL
MONOCYTES # BLD AUTO: 1.6 K/UL (ref 0.3–1)
MONOCYTES NFR BLD: 11.1 % (ref 4–15)
MRSA SCREEN BY PCR: NOT DETECTED
MYCOPLASMA PNEUMONIAE: NOT DETECTED
NEUTROPHILS # BLD AUTO: 11.9 K/UL (ref 1.8–7.7)
NEUTROPHILS NFR BLD: 85.4 % (ref 38–73)
NITRITE UR QL STRIP: NEGATIVE
NRBC BLD-RTO: 0 /100 WBC
OHS QRS DURATION: 112 MS
OHS QRS DURATION: 116 MS
OHS QTC CALCULATION: 452 MS
OHS QTC CALCULATION: 454 MS
PCO2 BLDA: 33.6 MMHG
PH SMN: 7.48 [PH]
PH UR STRIP: 6 [PH] (ref 5–8)
PHOSPHATE SERPL-MCNC: 2 MG/DL (ref 2.7–4.5)
PHOSPHATE SERPL-MCNC: 2.4 MG/DL (ref 2.7–4.5)
PLATELET # BLD AUTO: 153 K/UL (ref 150–450)
PMV BLD AUTO: ABNORMAL FL (ref 9.2–12.9)
PO2 BLDA: 35.4 MMHG
POC BASE DEFICIT: 1.9 MMOL/L
POC HCO3: 25.6 MMOL/L
POC PERFORMED BY: NORMAL
POC TEMPERATURE: 37 C
POCT GLUCOSE: 200 MG/DL (ref 70–110)
POCT GLUCOSE: 210 MG/DL (ref 70–110)
POCT GLUCOSE: 222 MG/DL (ref 70–110)
POCT GLUCOSE: 309 MG/DL (ref 70–110)
POCT GLUCOSE: 337 MG/DL (ref 70–110)
POTASSIUM SERPL-SCNC: 2.8 MMOL/L (ref 3.5–5.1)
POTASSIUM SERPL-SCNC: 3.5 MMOL/L (ref 3.5–5.1)
POTASSIUM SERPL-SCNC: 3.7 MMOL/L (ref 3.5–5.1)
POTASSIUM UR-SCNC: 30 MMOL/L (ref 15–95)
PROT SERPL-MCNC: 6 G/DL (ref 6–8.4)
PROT UR QL STRIP: ABNORMAL
RBC # BLD AUTO: 4.29 M/UL (ref 4.6–6.2)
RBC #/AREA URNS AUTO: 2 /HPF (ref 0–4)
RESPIRATORY INFECTION PANEL SOURCE: NORMAL
RSV RNA NPH QL NAA+NON-PROBE: NOT DETECTED
RV+EV RNA NPH QL NAA+NON-PROBE: NOT DETECTED
SARS-COV-2 RDRP RESP QL NAA+PROBE: NEGATIVE
SARS-COV-2 RNA RESP QL NAA+PROBE: NOT DETECTED
SITE: NORMAL
SODIUM SERPL-SCNC: 131 MMOL/L (ref 136–145)
SODIUM SERPL-SCNC: 132 MMOL/L (ref 136–145)
SODIUM UR-SCNC: 10 MMOL/L (ref 20–250)
SP GR UR STRIP: 1.01 (ref 1–1.03)
SPECIMEN SOURCE: NORMAL
SPECIMEN SOURCE: NORMAL
TACROLIMUS BLD-MCNC: 4.3 NG/ML (ref 5–15)
TROPONIN I SERPL DL<=0.01 NG/ML-MCNC: 178 NG/L (ref 0–35)
TROPONIN I SERPL DL<=0.01 NG/ML-MCNC: 186 NG/L (ref 0–35)
TROPONIN I SERPL DL<=0.01 NG/ML-MCNC: 196 NG/L (ref 0–35)
TROPONIN I SERPL DL<=0.01 NG/ML-MCNC: 203 NG/L (ref 0–35)
URN SPEC COLLECT METH UR: ABNORMAL
WBC # BLD AUTO: 13.98 K/UL (ref 3.9–12.7)
WBC #/AREA URNS AUTO: 1 /HPF (ref 0–5)
YEAST UR QL AUTO: NORMAL

## 2025-03-16 PROCEDURE — 96375 TX/PRO/DX INJ NEW DRUG ADDON: CPT | Mod: HCNC

## 2025-03-16 PROCEDURE — 84100 ASSAY OF PHOSPHORUS: CPT | Mod: HCNC

## 2025-03-16 PROCEDURE — 87502 INFLUENZA DNA AMP PROBE: CPT | Mod: HCNC

## 2025-03-16 PROCEDURE — 63600175 PHARM REV CODE 636 W HCPCS: Mod: HCNC

## 2025-03-16 PROCEDURE — 21400001 HC TELEMETRY ROOM: Mod: HCNC

## 2025-03-16 PROCEDURE — 84133 ASSAY OF URINE POTASSIUM: CPT | Mod: HCNC

## 2025-03-16 PROCEDURE — 80053 COMPREHEN METABOLIC PANEL: CPT | Mod: HCNC

## 2025-03-16 PROCEDURE — 25000003 PHARM REV CODE 250: Mod: HCNC

## 2025-03-16 PROCEDURE — 96365 THER/PROPH/DIAG IV INF INIT: CPT | Mod: HCNC

## 2025-03-16 PROCEDURE — 63600175 PHARM REV CODE 636 W HCPCS: Mod: HCNC | Performed by: INTERNAL MEDICINE

## 2025-03-16 PROCEDURE — 83880 ASSAY OF NATRIURETIC PEPTIDE: CPT | Mod: HCNC

## 2025-03-16 PROCEDURE — 93005 ELECTROCARDIOGRAM TRACING: CPT | Mod: HCNC

## 2025-03-16 PROCEDURE — 87040 BLOOD CULTURE FOR BACTERIA: CPT | Mod: HCNC

## 2025-03-16 PROCEDURE — 93010 ELECTROCARDIOGRAM REPORT: CPT | Mod: HCNC,,, | Performed by: INTERNAL MEDICINE

## 2025-03-16 PROCEDURE — 87635 SARS-COV-2 COVID-19 AMP PRB: CPT | Mod: HCNC

## 2025-03-16 PROCEDURE — 84100 ASSAY OF PHOSPHORUS: CPT | Mod: 91,HCNC

## 2025-03-16 PROCEDURE — 84300 ASSAY OF URINE SODIUM: CPT | Mod: HCNC

## 2025-03-16 PROCEDURE — 84132 ASSAY OF SERUM POTASSIUM: CPT | Mod: HCNC | Performed by: INTERNAL MEDICINE

## 2025-03-16 PROCEDURE — 87641 MR-STAPH DNA AMP PROBE: CPT | Mod: HCNC

## 2025-03-16 PROCEDURE — 80197 ASSAY OF TACROLIMUS: CPT | Mod: HCNC

## 2025-03-16 PROCEDURE — 80048 BASIC METABOLIC PNL TOTAL CA: CPT | Mod: HCNC,XB

## 2025-03-16 PROCEDURE — 85025 COMPLETE CBC W/AUTO DIFF WBC: CPT | Mod: HCNC

## 2025-03-16 PROCEDURE — 82803 BLOOD GASES ANY COMBINATION: CPT | Mod: HCNC

## 2025-03-16 PROCEDURE — 83735 ASSAY OF MAGNESIUM: CPT | Mod: HCNC

## 2025-03-16 PROCEDURE — 84484 ASSAY OF TROPONIN QUANT: CPT | Mod: HCNC

## 2025-03-16 PROCEDURE — 84484 ASSAY OF TROPONIN QUANT: CPT | Mod: 91,HCNC | Performed by: INTERNAL MEDICINE

## 2025-03-16 PROCEDURE — 99285 EMERGENCY DEPT VISIT HI MDM: CPT | Mod: 25,HCNC

## 2025-03-16 PROCEDURE — 83605 ASSAY OF LACTIC ACID: CPT | Mod: HCNC

## 2025-03-16 PROCEDURE — 0202U NFCT DS 22 TRGT SARS-COV-2: CPT | Mod: HCNC | Performed by: INTERNAL MEDICINE

## 2025-03-16 PROCEDURE — 99900035 HC TECH TIME PER 15 MIN (STAT): Mod: HCNC

## 2025-03-16 PROCEDURE — 11000001 HC ACUTE MED/SURG PRIVATE ROOM: Mod: HCNC

## 2025-03-16 PROCEDURE — 99223 1ST HOSP IP/OBS HIGH 75: CPT | Mod: HCNC,,, | Performed by: INTERNAL MEDICINE

## 2025-03-16 PROCEDURE — 81001 URINALYSIS AUTO W/SCOPE: CPT | Mod: HCNC

## 2025-03-16 PROCEDURE — 84484 ASSAY OF TROPONIN QUANT: CPT | Mod: 91,HCNC

## 2025-03-16 RX ORDER — HYDROCHLOROTHIAZIDE 12.5 MG/1
12.5 TABLET ORAL DAILY
Status: DISCONTINUED | OUTPATIENT
Start: 2025-03-16 | End: 2025-03-16

## 2025-03-16 RX ORDER — MAGNESIUM SULFATE HEPTAHYDRATE 40 MG/ML
2 INJECTION, SOLUTION INTRAVENOUS ONCE
Status: COMPLETED | OUTPATIENT
Start: 2025-03-16 | End: 2025-03-16

## 2025-03-16 RX ORDER — ACETAMINOPHEN 325 MG/1
650 TABLET ORAL EVERY 6 HOURS PRN
Status: DISCONTINUED | OUTPATIENT
Start: 2025-03-16 | End: 2025-03-19 | Stop reason: HOSPADM

## 2025-03-16 RX ORDER — ERGOCALCIFEROL 1.25 MG/1
50000 CAPSULE ORAL
Status: DISCONTINUED | OUTPATIENT
Start: 2025-03-16 | End: 2025-03-19 | Stop reason: HOSPADM

## 2025-03-16 RX ORDER — ACETAMINOPHEN 500 MG
1000 TABLET ORAL
Status: COMPLETED | OUTPATIENT
Start: 2025-03-16 | End: 2025-03-16

## 2025-03-16 RX ORDER — POTASSIUM CHLORIDE 7.45 MG/ML
10 INJECTION INTRAVENOUS
Status: DISPENSED | OUTPATIENT
Start: 2025-03-16 | End: 2025-03-16

## 2025-03-16 RX ORDER — INSULIN ASPART 100 [IU]/ML
0-5 INJECTION, SOLUTION INTRAVENOUS; SUBCUTANEOUS
Status: DISCONTINUED | OUTPATIENT
Start: 2025-03-16 | End: 2025-03-19 | Stop reason: HOSPADM

## 2025-03-16 RX ORDER — NALOXONE HCL 0.4 MG/ML
0.02 VIAL (ML) INJECTION
Status: DISCONTINUED | OUTPATIENT
Start: 2025-03-16 | End: 2025-03-19 | Stop reason: HOSPADM

## 2025-03-16 RX ORDER — POTASSIUM CHLORIDE 7.45 MG/ML
10 INJECTION INTRAVENOUS ONCE
Status: COMPLETED | OUTPATIENT
Start: 2025-03-16 | End: 2025-03-16

## 2025-03-16 RX ORDER — GLUCAGON 1 MG
1 KIT INJECTION
Status: DISCONTINUED | OUTPATIENT
Start: 2025-03-16 | End: 2025-03-19 | Stop reason: HOSPADM

## 2025-03-16 RX ORDER — INSULIN GLARGINE 100 [IU]/ML
20 INJECTION, SOLUTION SUBCUTANEOUS EVERY MORNING
Status: DISCONTINUED | OUTPATIENT
Start: 2025-03-16 | End: 2025-03-16

## 2025-03-16 RX ORDER — INSULIN GLARGINE 100 [IU]/ML
15 INJECTION, SOLUTION SUBCUTANEOUS EVERY MORNING
Status: DISCONTINUED | OUTPATIENT
Start: 2025-03-16 | End: 2025-03-19 | Stop reason: HOSPADM

## 2025-03-16 RX ORDER — IBUPROFEN 200 MG
16 TABLET ORAL
Status: DISCONTINUED | OUTPATIENT
Start: 2025-03-16 | End: 2025-03-19 | Stop reason: HOSPADM

## 2025-03-16 RX ORDER — SODIUM CHLORIDE 0.9 % (FLUSH) 0.9 %
10 SYRINGE (ML) INJECTION
Status: DISCONTINUED | OUTPATIENT
Start: 2025-03-16 | End: 2025-03-16

## 2025-03-16 RX ORDER — SODIUM CHLORIDE 0.9 % (FLUSH) 0.9 %
10 SYRINGE (ML) INJECTION EVERY 12 HOURS PRN
Status: DISCONTINUED | OUTPATIENT
Start: 2025-03-16 | End: 2025-03-19 | Stop reason: HOSPADM

## 2025-03-16 RX ORDER — TALC
6 POWDER (GRAM) TOPICAL NIGHTLY PRN
Status: DISCONTINUED | OUTPATIENT
Start: 2025-03-16 | End: 2025-03-19 | Stop reason: HOSPADM

## 2025-03-16 RX ORDER — INSULIN ASPART 100 [IU]/ML
10 INJECTION, SOLUTION INTRAVENOUS; SUBCUTANEOUS
Status: DISCONTINUED | OUTPATIENT
Start: 2025-03-16 | End: 2025-03-17

## 2025-03-16 RX ORDER — GABAPENTIN 100 MG/1
100 CAPSULE ORAL 3 TIMES DAILY
Status: DISCONTINUED | OUTPATIENT
Start: 2025-03-16 | End: 2025-03-19 | Stop reason: HOSPADM

## 2025-03-16 RX ORDER — IBUPROFEN 200 MG
24 TABLET ORAL
Status: DISCONTINUED | OUTPATIENT
Start: 2025-03-16 | End: 2025-03-19 | Stop reason: HOSPADM

## 2025-03-16 RX ORDER — TACROLIMUS 0.5 MG/1
0.5 CAPSULE ORAL EVERY MORNING
Status: DISCONTINUED | OUTPATIENT
Start: 2025-03-16 | End: 2025-03-17

## 2025-03-16 RX ORDER — POLYETHYLENE GLYCOL 3350 17 G/17G
POWDER, FOR SOLUTION ORAL
COMMUNITY

## 2025-03-16 RX ORDER — ATORVASTATIN CALCIUM 40 MG/1
80 TABLET, FILM COATED ORAL DAILY
Status: DISCONTINUED | OUTPATIENT
Start: 2025-03-16 | End: 2025-03-19 | Stop reason: HOSPADM

## 2025-03-16 RX ORDER — HYDRALAZINE HYDROCHLORIDE 25 MG/1
100 TABLET, FILM COATED ORAL EVERY 8 HOURS
Status: DISCONTINUED | OUTPATIENT
Start: 2025-03-16 | End: 2025-03-19 | Stop reason: HOSPADM

## 2025-03-16 RX ORDER — DOXAZOSIN 2 MG/1
4 TABLET ORAL DAILY
Status: DISCONTINUED | OUTPATIENT
Start: 2025-03-16 | End: 2025-03-19 | Stop reason: HOSPADM

## 2025-03-16 RX ADMIN — ACETAMINOPHEN 650 MG: 325 TABLET ORAL at 03:03

## 2025-03-16 RX ADMIN — POTASSIUM CHLORIDE 10 MEQ: 7.46 INJECTION, SOLUTION INTRAVENOUS at 06:03

## 2025-03-16 RX ADMIN — PIPERACILLIN SODIUM AND TAZOBACTAM SODIUM 4.5 G: 4; .5 INJECTION, POWDER, FOR SOLUTION INTRAVENOUS at 05:03

## 2025-03-16 RX ADMIN — RIVAROXABAN 15 MG: 10 TABLET, FILM COATED ORAL at 04:03

## 2025-03-16 RX ADMIN — EMPAGLIFLOZIN 10 MG: 10 TABLET, FILM COATED ORAL at 10:03

## 2025-03-16 RX ADMIN — INSULIN ASPART 10 UNITS: 100 INJECTION, SOLUTION INTRAVENOUS; SUBCUTANEOUS at 05:03

## 2025-03-16 RX ADMIN — TACROLIMUS 0.5 MG: 0.5 CAPSULE ORAL at 07:03

## 2025-03-16 RX ADMIN — PIPERACILLIN SODIUM AND TAZOBACTAM SODIUM 4.5 G: 4; .5 INJECTION, POWDER, FOR SOLUTION INTRAVENOUS at 10:03

## 2025-03-16 RX ADMIN — ATORVASTATIN CALCIUM 80 MG: 40 TABLET, FILM COATED ORAL at 07:03

## 2025-03-16 RX ADMIN — INSULIN ASPART 4 UNITS: 100 INJECTION, SOLUTION INTRAVENOUS; SUBCUTANEOUS at 11:03

## 2025-03-16 RX ADMIN — INSULIN ASPART 2 UNITS: 100 INJECTION, SOLUTION INTRAVENOUS; SUBCUTANEOUS at 08:03

## 2025-03-16 RX ADMIN — ERGOCALCIFEROL 50000 UNITS: 1.25 CAPSULE ORAL at 08:03

## 2025-03-16 RX ADMIN — DIBASIC SODIUM PHOSPHATE, MONOBASIC POTASSIUM PHOSPHATE AND MONOBASIC SODIUM PHOSPHATE 2 TABLET: 852; 155; 130 TABLET ORAL at 08:03

## 2025-03-16 RX ADMIN — HYDRALAZINE HYDROCHLORIDE 100 MG: 25 TABLET ORAL at 06:03

## 2025-03-16 RX ADMIN — HYDRALAZINE HYDROCHLORIDE 100 MG: 25 TABLET ORAL at 10:03

## 2025-03-16 RX ADMIN — POTASSIUM CHLORIDE 10 MEQ: 7.46 INJECTION, SOLUTION INTRAVENOUS at 04:03

## 2025-03-16 RX ADMIN — HYDRALAZINE HYDROCHLORIDE 100 MG: 25 TABLET ORAL at 01:03

## 2025-03-16 RX ADMIN — DOXAZOSIN 4 MG: 2 TABLET ORAL at 07:03

## 2025-03-16 RX ADMIN — INSULIN GLARGINE 15 UNITS: 100 INJECTION, SOLUTION SUBCUTANEOUS at 07:03

## 2025-03-16 RX ADMIN — GABAPENTIN 100 MG: 100 CAPSULE ORAL at 02:03

## 2025-03-16 RX ADMIN — ACETAMINOPHEN 1000 MG: 500 TABLET ORAL at 02:03

## 2025-03-16 RX ADMIN — PIPERACILLIN SODIUM AND TAZOBACTAM SODIUM 4.5 G: 4; .5 INJECTION, POWDER, FOR SOLUTION INTRAVENOUS at 02:03

## 2025-03-16 RX ADMIN — GABAPENTIN 100 MG: 100 CAPSULE ORAL at 08:03

## 2025-03-16 RX ADMIN — HYDROCHLOROTHIAZIDE 12.5 MG: 12.5 TABLET ORAL at 07:03

## 2025-03-16 RX ADMIN — POTASSIUM CHLORIDE 10 MEQ: 7.46 INJECTION, SOLUTION INTRAVENOUS at 10:03

## 2025-03-16 RX ADMIN — POTASSIUM BICARBONATE 25 MEQ: 978 TABLET, EFFERVESCENT ORAL at 07:03

## 2025-03-16 RX ADMIN — MAGNESIUM SULFATE HEPTAHYDRATE 2 G: 40 INJECTION, SOLUTION INTRAVENOUS at 03:03

## 2025-03-16 RX ADMIN — POTASSIUM BICARBONATE 40 MEQ: 391 TABLET, EFFERVESCENT ORAL at 03:03

## 2025-03-16 RX ADMIN — SODIUM CHLORIDE 2250 MG: 9 INJECTION, SOLUTION INTRAVENOUS at 03:03

## 2025-03-16 RX ADMIN — DIBASIC SODIUM PHOSPHATE, MONOBASIC POTASSIUM PHOSPHATE AND MONOBASIC SODIUM PHOSPHATE 2 TABLET: 852; 155; 130 TABLET ORAL at 02:03

## 2025-03-16 NOTE — ASSESSMENT & PLAN NOTE
History of kidney transplant on immunosuppression  KTM consulted  Hold MMF & prednisone in s/o infxn  Continue home tacro dose  Daily tacro level

## 2025-03-16 NOTE — HPI
70 yo M with extensive history notable for kidney transplant (2016) on immunosuppression with CKD, chronic combined CHF (FELICIANO 2/3/25 EF 40-45%, TTE 12/8/24 G3DD), CVA right MCA 12/24 with minimal residual LLE and left hand weakness, DM2, atrial fib/flutter s/p DCCV 02/03/2025 who presents to the ED for a 102F fever at home. He reports a wet cough that started the night of his admission. He was recently hospitalized for syncope last month and discharged on a 30 day cardiac monitor. Beta blocker held then due to iatrogenic bradycardia to the 20s. He is on MMF, Tacrolimus and Prednisone. He denies dysuria or suprapubic pain. He does not have any open wounds.     In the ED, he was afebrile, HR 70, /70. Labs remarkable for WBC 14, Hg 10 (baseline), Na 131, K 2.8, BUN 39, Cr 2.5 (baseline). At 3:13AM, he had an 18 beat run of Vtach before he had electrolyte repletion. He reported palpitations during the event that resolved. He denied chest pain, abdominal pain, shortness of breath and nausea.  CXR was revealing for RUL consolidation concerning for hospital acquired pneumonia given his recent hospitalization. He was admitted for pneumonia and placed on broad spectrum antibiotics.

## 2025-03-16 NOTE — ASSESSMENT & PLAN NOTE
18 beats of monomorphic, wide complex tachycardia noted on 3/16/25 around 3AM    -replete K, Mg, Phos  -follow up repeat BMP @ 10AM  -continue telemetry

## 2025-03-16 NOTE — ASSESSMENT & PLAN NOTE
Patient has persistent (7 days or more) atrial fibrillation. Patient is currently in sinus rhythm. [id number] Score: 3. The patients heart rate in the last 24 hours is as follows:  Pulse  Min: 70  Max: 211     Antiarrhythmics       Anticoagulants       Plan  - Replete lytes with a goal of K>4, Mg >2  - Patient is anticoagulated, see medications listed above.  - Patient's afib is currently controlled  - cont home Xarelto   - BB held due to iatrogenic bradycardia  - Continuous telemetry

## 2025-03-16 NOTE — PHARMACY MED REC
"Admission Medication History     The home medication history was taken by Veronique Harris.    You may go to "Admission" then "Reconcile Home Medications" tabs to review and/or act upon these items.     The home medication list has been updated by the Pharmacy department.   Please read ALL comments highlighted in yellow.   Please address this information as you see fit.    Feel free to contact us if you have any questions or require assistance.      The medications listed below were removed from the home medication list. Please reorder if appropriate:  Patient reports no longer taking the following medication(s):  GLUCAGON 1 MG/0.2 ML ATLN    Medications listed below were obtained from: Patient/family and Analytic software- [employer]  Current Outpatient Medications on File Prior to Encounter   Medication Sig    atorvastatin (LIPITOR) 80 MG tablet Take 1 tablet (80 mg total) by mouth once daily.    doxazosin (CARDURA) 4 MG tablet Take 1 tablet (4 mg total) by mouth once daily.    empagliflozin (JARDIANCE) 10 mg tablet Take 1 tablet (10 mg total) by mouth once daily.    ergocalciferol (ERGOCALCIFEROL) 50,000 unit Cap Take 1 capsule (50,000 Units total) by mouth every 7 days.    furosemide (LASIX) 40 MG tablet Take 2 tablets (80 mg total) by mouth daily as needed (for swelling).    gabapentin (NEURONTIN) 300 MG capsule Take 1 capsule by mouth in the morning, 1 capsule midday, and 3 capsules at night.    hydrALAZINE (APRESOLINE) 100 MG tablet Take 1 tablet (100 mg total) by mouth every 8 (eight) hours.    hydroCHLOROthiazide 12.5 MG Tab Take 1 tablet (12.5 mg total) by mouth once daily.    insulin aspart U-100 (NOVOLOG U-100 INSULIN ASPART) 100 unit/mL injection Use in pump, max daily 100 units.    insulin aspart U-100 (NOVOLOG) 100 unit/mL (3 mL) InPn pen Inject 10 units under the skin w/ breakfast, 7 units at lunch and dinner. Scale 180-230+2, 231-280+4, 281-330+6, 331-380+8, >380+10.  Max daily 57 units.    insulin " "glargine U-100, Lantus, (LANTUS SOLOSTAR U-100 INSULIN) 100 unit/mL (3 mL) InPn pen Inject 20 Units into the skin every morning.    mycophenolate (CELLCEPT) 250 mg Cap Take 2 capsules (500 mg total) by mouth 2 (two) times daily.    polyethylene glycol (MIRALAX) 17 gram PwPk Take 17 gm (mixed in liquid) by mouth every day.    predniSONE (DELTASONE) 5 MG tablet Take 1 tablet (5 mg total) by mouth once daily.    rivaroxaban (XARELTO) 15 mg Tab Take 1 tablet (15 mg total) by mouth daily with dinner or evening meal.    tacrolimus (PROGRAF) 0.5 MG Cap Take 1 capsule (0.5 mg total) by mouth once daily. Start tomorrow    blood sugar diagnostic Strp Use to check blood glucose 1 times daily, to use with insurance preferred meter    blood-glucose meter Misc Use to check blood glucose 1 times daily, to use with insurance preferred meter    blood-glucose sensor Kayla Gin 3, use as directed, change every 14 days , e 11.65    insulin  cart,aut,G6/7,cntr (OMNIPOD 5 G6-G7 INTRO KT,GEN5,) Crtg Use as directed.    lancets 30 gauge Misc Use to check blood glucose 1 times daily, to use with insurance preferred meter    magnesium oxide (MAG-OX) 400 mg (241.3 mg magnesium) tablet Take 1 tablet (400 mg total) by mouth 2 (two) times daily.    meclizine (ANTIVERT) 25 mg tablet Take 1 tablet (25 mg total) by mouth 3 (three) times daily as needed for Dizziness.    pen needle, diabetic (BD ULTRA-FINE LO PEN NEEDLE) 32 gauge x 5/32" Ndle USE TO ADMINISTER INSULIN 4 TIMES DAILY.    valsartan (DIOVAN) 320 MG tablet Take 1 tablet (320 mg total) by mouth once daily. HOLD until follow up with kidney doctor (Patient not taking: Reported on 3/16/2025). On hold at last hospital admission.       Potential issues to be addressed PRIOR TO DISCHARGE  Patient requested refills for the following medications: (MAG-OX)            Veronique Harris  EXT [id number]                  .          "

## 2025-03-16 NOTE — CARE UPDATE
"Hospital Medicine Care Update Note    Evaluated pt & rounded w/ staff attending Dr Marte this AM.    Briefly, this is 69 y.o. male p/w fever & cough, c/f HCAP  Assessment & Plan  Hospital-acquired pneumonia  Patient has a diagnosis of pneumonia. The cause of the pneumonia is suspected to be bacterial in etiology but organism is not known. The pneumonia is stable. The patient has the following signs/symptoms of pneumonia: cough and sputum production. The patient does not have a current oxygen requirement and the patient does not have a home oxygen requirement. I have reviewed the pertinent imaging. The following cultures have been collected: Blood cultures and Sputum culture The culture results are listed below.     Current antimicrobial regimen consists of the antibiotics listed below. Will monitor patient closely and Adjust treatment plan as follows      Antibiotics (From admission, onward)      Start     Stop Route Frequency Ordered    03/17/25 0300  vancomycin 1,250 mg in 0.9% NaCl 250 mL IVPB (admixture device)         -- IV Every 24 hours (non-standard times) 03/16/25 0450    03/16/25 0534  vancomycin - pharmacy to dose  (vancomycin IVPB (PEDS and ADULTS))        Placed in "And" Linked Group    -- IV pharmacy to manage frequency 03/16/25 0437    03/16/25 0200  piperacillin-tazobactam (ZOSYN) 4.5 g in D5W 100 mL IVPB (MB+)  (ED Adult Sepsis Treatment)         03/17/25 0159 IV Every 8 hours (non-standard times) 03/16/25 0158          Microbiology Results (last 7 days)       Procedure Component Value Units Date/Time    Respiratory Infection Panel (PCR), Nasopharyngeal [[id number]] Collected: 03/16/25 1211    Order Status: Completed Specimen: Nasopharyngeal Swab Updated: 03/16/25 1236     Respiratory Infection Panel Source NP Swab    Narrative:      Assay not valid for lower respiratory specimens, alternate  testing required.    Culture, Respiratory with Gram Stain [[id number]]     Order Status: No result " Specimen: Respiratory     MRSA Screen by PCR [[id number]]     Order Status: No result Specimen: Nasal Swab     Blood culture x two cultures. Draw prior to antibiotics. [[id number]] Collected: 03/16/25 0217    Order Status: Sent Specimen: Blood from Peripheral, Hand, Right Updated: 03/16/25 0225    Influenza A & B by Molecular [[id number]] Collected: 03/16/25 0142    Order Status: Completed Specimen: Nasopharyngeal Swab Updated: 03/16/25 0222     Influenza A, Molecular Negative     Influenza B, Molecular Negative     Flu A & B Source Nasal swab    Blood culture x two cultures. Draw prior to antibiotics. [[id number]] Collected: 03/16/25 0139    Order Status: Sent Specimen: Blood from Peripheral, Upper Arm, Left Updated: 03/16/25 0200        COVID, flu -ve  RUL consolidation on CXR  MRSA PCR ordered; d/c vanc if -ve  Rcx, RIP ordered  Continue BSA (vanc, pip-tazo); add azithro if clinically worsens  Type 2 diabetes mellitus with stage 3a chronic kidney disease, with long-term current use of insulin  Diabetic polyneuropathy associated with type 2 diabetes mellitus  Lab Results   Component Value Date    HGBA1C 8.6 (H) 01/27/2025    HGBA1C 8.1 (H) 12/07/2024    HGBA1C 7.4 (H) 11/19/2024     Recent Labs     03/16/25  0140 03/16/25  0733 03/16/25  1050 03/16/25  1104 03/16/25  1107   POCTGLUCOSE  --    < >  --    < > 309*   *  --  274*  --   --     < > = values in this interval not displayed.     Antihyperglycemics (From admission, onward)      Start     Stop Route Frequency Ordered    03/16/25 0900  empagliflozin (Jardiance) tablet 10 mg        Question Answer Comment   Does this patient have a diagnosis of heart failure? Yes    Does this patient have type 1 diabetes or diabetic ketoacidosis? No    Does this patient have symptomatic hypotension? No    Is the patient NPO or pending major surgery in next 3 days or less? No        -- Oral Daily 03/16/25 0447    03/16/25 0700  insulin glargine U-100 (Lantus) pen 15  Units         -- SubQ Every morning 03/16/25 0517    03/16/25 0549  insulin aspart U-100 pen 0-5 Units         -- SubQ Before meals & nightly PRN 03/16/25 0450        Basal/prandial/SSI titrated PRN goal Glc 140-180  Continue home gabapentin for neuropathy  Hypoglycemia protocol  Hold PO antiglycemics while hospitalized  Diet diabetic   Essential hypertension  Most recent BP: (!) 180/80 (03/16/25 1202); 24 hr range BP  Min: 140/63  Max: 180/80  Current meds: doxazosin tablet 4 mg, Daily, Oral  hydrALAZINE tablet 100 mg, Every 8 hours, Oral  Adjust regimen PRN goal SBP <180  Hold ARB, diuretic in s/o PHUONG   Hyperlipidemia  Continue statin  Persistent atrial fibrillation  Patient has paroxysmal (<7 days) atrial fibrillation. Patient is currently in sinus rhythm. [id number] Score: 3. The patients heart rate in the last 24 hours is as follows: Pulse  Min: 70  Max: 211  Antiarrhythmics:    Anticoagulants: rivaroxaban tablet 15 mg, With dinner, Oral  Replete lytes with a goal of K>4, Mg >2  Patient is anticoagulated, see medications listed above.  Patient's afib is currently controlled  Elevated troponin  Elevated troponin on admission with no chest pressure/pain, sob, abdominal pain, nausea. It could be related to his underlying infection. Continue to monitor for symptoms. No indication for ACS at this time. ECG w/ some V5-6 changes; ischemia?  Repeat ECG  Cardiac tele  Replace K & Mg PRN  Consider Cards consult  Immunosuppressed status  History of kidney transplant on immunosuppression  KTM consulted  Hold MMF & prednisone in s/o infxn  Continue home tacro dose  Daily tacro level  History of CVA (cerebrovascular accident)  Stable  On DOAC & statin; not on DAPT  Wide-complex tachycardia  18 beats of monomorphic, wide complex tachycardia noted on 3/16/25 around 3AM  Repeat ECG  Cardiac tele  Replace K & Mg PRN  Consider Cards consult  Darwin Leroy MD  [facility] Medicine, [facility], PGY-3

## 2025-03-16 NOTE — HPI
"Referring Physician: Alexi Glasgow  Current Nephrologist: Angélica Munoz    ORGAN: RIGHT KIDNEY  Donor Type: donation after brain death  PHS Increased Risk: yes  Cold Ischemia: 314 mins  Induction Medications: thymoglobulin    Renal Allograft function: CKD stage 4. Functioning well.  Good urine output.  Baseline Creatinine of allograft: 2.3-2.6  Creatinine at time of consult: 2.4  Tests done: UA (3+ protein, 4+ glucose), UPCR 5.57 in 12/24.       SUBJECTIVE   [place]  HPI: 68 yo M with extensive history notable for kidney transplant (2016) on immunosuppression with CKD 3-4, chronic combined CHF (FELICIANO 2/3/25 EF 40-45%, CVA right MCA 12/24, atrial fib/flutter s/p DCCV 02/03/2025 who presents to the ED for a 102F fever at home. He reports a wet cough that started the night of his admission. He was recently hospitalized for syncope last month and discharged on a 30 day cardiac monitor.        Na 131, K 2.8, BUN 39, Cr 2.5 (baseline). At 3:13AM,   CXR was revealing for RUL consolidation concerning for hospital acquired pneumonia given his recent hospitalization. He was admitted for pneumonia and placed on broad spectrum antibiotics.       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 2 day de los santos, KDPI 85%, induction through thymoglobulin, PRA range 24%)    Was on PD for 1 and half years, with access peritoneal catheter.    EF on last echo was 50-55% and PAS 30 mmHg    Donor/Recipient: CMV ++ , HCV ++    Donor was also +RPR    Tx Surgery:  Tx surgery was done at [facility] [place]. Had de los santos for 3 days, 1 drain place, Stent were required.      BK virus infection screening:   No results found for: "BKVIRUSDNAUR", "BKQUANTURINE", "BKVIRUSLOG", "BKVIRUSURINE"      Lab " Results   Component Value Date    MICALBCREAT Unable to calculate 12/06/2024    CREATININE 2.4 (H) 03/17/2025

## 2025-03-16 NOTE — ED NOTES
Pt run 18 beat of [facility] ,Dr Ajit Sharma notified.Pt placed on Electrodes pads ,Code Card at bedside.

## 2025-03-16 NOTE — ASSESSMENT & PLAN NOTE
Lab Results   Component Value Date    HGBA1C 8.6 (H) 01/27/2025    HGBA1C 8.1 (H) 12/07/2024    HGBA1C 7.4 (H) 11/19/2024     Recent Labs     03/16/25  0140 03/16/25  0733 03/16/25  1050 03/16/25  1104 03/16/25  1107   POCTGLUCOSE  --    < >  --    < > 309*   *  --  274*  --   --     < > = values in this interval not displayed.     Antihyperglycemics (From admission, onward)      Start     Stop Route Frequency Ordered    03/16/25 0900  empagliflozin (Jardiance) tablet 10 mg        Question Answer Comment   Does this patient have a diagnosis of heart failure? Yes    Does this patient have type 1 diabetes or diabetic ketoacidosis? No    Does this patient have symptomatic hypotension? No    Is the patient NPO or pending major surgery in next 3 days or less? No        -- Oral Daily 03/16/25 0447    03/16/25 0700  insulin glargine U-100 (Lantus) pen 15 Units         -- SubQ Every morning 03/16/25 0517    03/16/25 0549  insulin aspart U-100 pen 0-5 Units         -- SubQ Before meals & nightly PRN 03/16/25 0450        Basal/prandial/SSI titrated PRN goal Glc 140-180  Continue home gabapentin for neuropathy  Hypoglycemia protocol  Hold PO antiglycemics while hospitalized  Diet diabetic

## 2025-03-16 NOTE — ASSESSMENT & PLAN NOTE
Patient has paroxysmal (<7 days) atrial fibrillation. Patient is currently in sinus rhythm. [id number] Score: 3. The patients heart rate in the last 24 hours is as follows: Pulse  Min: 70  Max: 211  Antiarrhythmics:    Anticoagulants: rivaroxaban tablet 15 mg, With dinner, Oral  Replete lytes with a goal of K>4, Mg >2  Patient is anticoagulated, see medications listed above.  Patient's afib is currently controlled

## 2025-03-16 NOTE — ED NOTES
Received report from HE Doshi    LOC: The patient is awake, alert and aware of environment with an appropriate affect, the patient is oriented x 3 and speaking appropriately.   APPEARANCE: Patient appears comfortable and in no acute distress, patient is clean and well groomed.  SKIN: The skin is warm and dry, color consistent with ethnicity, patient has normal skin turgor and moist mucus membranes, skin intact, no breakdown or bruising noted.   MUSCULOSKELETAL: Patient moving all extremities spontaneously, no swelling noted. Reports generalized weakness, states he feels better than he did yesterday.  RESPIRATORY: Airway is open and patent, respirations are spontaneous, patient has a normal effort and rate, no accessory muscle. Denies SOB, currently on RA  CARDIAC: Pt placed on cardiac monitor. Patient has a normal rate and regular rhythm, no edema noted, capillary refill < 3 seconds. Denies CP.  GASTRO: Soft and non tender to palpation, no distention noted. Denies N/V.  : Pt denies any pain or frequency with urination.  NEURO: Pt opens eyes spontaneously, behavior appropriate to situation, follows commands, facial expression symmetrical, bilateral hand grasp equal and even, purposeful motor response noted, normal sensation in all extremities when touched with a finger.

## 2025-03-16 NOTE — ASSESSMENT & PLAN NOTE
"Patient has a diagnosis of pneumonia. The cause of the pneumonia is suspected to be bacterial in etiology but organism is not known. The pneumonia is stable. The patient has the following signs/symptoms of pneumonia: cough and sputum production. The patient does not have a current oxygen requirement and the patient does not have a home oxygen requirement. I have reviewed the pertinent imaging. The following cultures have been collected: Blood cultures and Sputum culture The culture results are listed below.     Current antimicrobial regimen consists of the antibiotics listed below. Will monitor patient closely and Adjust treatment plan as follows      Antibiotics (From admission, onward)      Start     Stop Route Frequency Ordered    03/17/25 0300  vancomycin 1,250 mg in 0.9% NaCl 250 mL IVPB (admixture device)         -- IV Every 24 hours (non-standard times) 03/16/25 0450    03/16/25 0534  vancomycin - pharmacy to dose  (vancomycin IVPB (PEDS and ADULTS))        Placed in "And" Linked Group    -- IV pharmacy to manage frequency 03/16/25 0437    03/16/25 0200  piperacillin-tazobactam (ZOSYN) 4.5 g in D5W 100 mL IVPB (MB+)  (ED Adult Sepsis Treatment)         03/17/25 0159 IV Every 8 hours (non-standard times) 03/16/25 0158          Microbiology Results (last 7 days)       Procedure Component Value Units Date/Time    Respiratory Infection Panel (PCR), Nasopharyngeal [[id number]] Collected: 03/16/25 1211    Order Status: Completed Specimen: Nasopharyngeal Swab Updated: 03/16/25 1236     Respiratory Infection Panel Source NP Swab    Narrative:      Assay not valid for lower respiratory specimens, alternate  testing required.    Culture, Respiratory with Gram Stain [[id number]]     Order Status: No result Specimen: Respiratory     MRSA Screen by PCR [[id number]]     Order Status: No result Specimen: Nasal Swab     Blood culture x two cultures. Draw prior to antibiotics. [[id number]] Collected: 03/16/25 0217    Order " Status: Sent Specimen: Blood from Peripheral, Hand, Right Updated: 03/16/25 0225    Influenza A & B by Molecular [[id number]] Collected: 03/16/25 0142    Order Status: Completed Specimen: Nasopharyngeal Swab Updated: 03/16/25 0222     Influenza A, Molecular Negative     Influenza B, Molecular Negative     Flu A & B Source Nasal swab    Blood culture x two cultures. Draw prior to antibiotics. [[id number]] Collected: 03/16/25 0139    Order Status: Sent Specimen: Blood from Peripheral, Upper Arm, Left Updated: 03/16/25 0200        COVID, flu -ve  RUL consolidation on CXR  MRSA PCR ordered; d/c vanc if -ve  Rcx, RIP ordered  Continue BSA (vanc, pip-tazo); add azithro if clinically worsens

## 2025-03-16 NOTE — ASSESSMENT & PLAN NOTE
History of kidney transplant on immunosuppression    -KTM consulted  -hold MMF and prednisone  -continue home tacro dose  -daily tacro level

## 2025-03-16 NOTE — ASSESSMENT & PLAN NOTE
Elevated troponin on admission with no chest pressure/pain, sob, abdominal pain, nausea. It could be related to his underlying infection. Continue to monitor for symptoms. No indication for ACS at this time.       -trend troponin  -stat EKG for chest pain

## 2025-03-16 NOTE — SUBJECTIVE & OBJECTIVE
Subjective:   History of Present Illness:  HPI: 68 yo M with extensive history notable for kidney transplant (2016) on immunosuppression with CKD 3-4, chronic combined CHF (FELICIANO 2/3/25 EF 40-45%, CVA right MCA 12/24, atrial fib/flutter s/p DCCV 02/03/2025 who presents to the ED for a 102F fever at home. He reports a wet cough that started the night of his admission. He was recently hospitalized for syncope last month and discharged on a 30 day cardiac monitor.        Na 131, K 2.8, BUN 39, Cr 2.5 (baseline). At 3:13AM,   CXR was revealing for RUL consolidation concerning for hospital acquired pneumonia given his recent hospitalization. He was admitted for pneumonia and placed on broad spectrum antibiotics.         Scheduled Meds:   atorvastatin  80 mg Oral Daily    doxazosin  4 mg Oral Daily    empagliflozin  10 mg Oral Daily    epoetin tanya  4,000 Units Subcutaneous Q7 Days    ergocalciferol  50,000 Units Oral Q7 Days    gabapentin  100 mg Oral TID    hydrALAZINE  100 mg Oral Q8H    insulin glargine U-100 (Lantus)  15 Units Subcutaneous QAM    k phos di & mono-sod phos mono  500 mg Oral TID    piperacillin-tazobactam (Zosyn) IV (PEDS and ADULTS) (extended infusion is not appropriate)  4.5 g Intravenous Q8H    rivaroxaban  15 mg Oral Daily with dinner    tacrolimus  0.5 mg Oral Daily AM    [START ON 3/17/2025] vancomycin (VANCOCIN) IV (PEDS and ADULTS)  15 mg/kg Intravenous Q24H     Continuous Infusions:  PRN Meds:  Current Facility-Administered Medications:     dextrose 50%, 12.5 g, Intravenous, PRN    dextrose 50%, 25 g, Intravenous, PRN    glucagon (human recombinant), 1 mg, Intramuscular, PRN    glucose, 16 g, Oral, PRN    glucose, 24 g, Oral, PRN    insulin aspart U-100, 0-5 Units, Subcutaneous, QID (AC + HS) PRN    melatonin, 6 mg, Oral, Nightly PRN    naloxone, 0.02 mg, Intravenous, PRN    sodium chloride 0.9%, 10 mL, Intravenous, Q12H PRN    Pharmacy to dose Vancomycin consult, , , Once **AND** vancomycin -  pharmacy to dose, , Intravenous, pharmacy to manage frequency    Intake/Output - Last 3 Shifts         03/14 0700  03/15 0659 03/15 0700  03/16 0659 03/16 0700  03/17 0659    IV Piggyback   198.8    Total Intake(mL/kg)   198.8 (2.2)    Urine (mL/kg/hr)   400 (0.8)    Total Output   400    Net   -201.2                    Review of Systems   Constitutional:  Positive for chills, fatigue and fever.   Respiratory:  Positive for cough. Negative for chest tightness and shortness of breath.    Cardiovascular:  Negative for chest pain.   Gastrointestinal:  Negative for abdominal pain and constipation.   Genitourinary:  Negative for dysuria and flank pain.     Objective:     Vital Signs (Most Recent):  Temp: 97.8 °F (36.6 °C) (03/16/25 1104)  Pulse: 77 (03/16/25 1202)  Resp: 20 (03/16/25 1202)  BP: (!) 180/80 (03/16/25 1202)  SpO2: 98 % (03/16/25 1202) Vital Signs (24h Range):  Temp:  [97.8 °F (36.6 °C)-99.9 °F (37.7 °C)] 97.8 °F (36.6 °C)  Pulse:  [] 77  Resp:  [16-20] 20  SpO2:  [96 %-98 %] 98 %  BP: (140-180)/(63-85) 180/80     Weight: 91.6 kg (202 lb)  Height: 6' (182.9 cm)  Body mass index is 27.4 kg/m².     Physical Exam    Cardiovascular:      Rate and Rhythm: Normal rate and regular rhythm.   Pulmonary:      Effort: Pulmonary effort is normal. No respiratory distress.      Breath sounds: Rales present. No wheezing.   Abdominal:      General: There is distension.      Palpations: Abdomen is soft.      Tenderness: There is no abdominal tenderness. There is no guarding or rebound.   Neurological:      Mental Status: He is alert. Mental status is at baseline.     Laboratory:  Labs within the past 24 hours have been reviewed.

## 2025-03-16 NOTE — PROGRESS NOTES
"Pharmacokinetic Initial Assessment: IV Vancomycin    Assessment/Plan:    Initiate intravenous vancomycin with loading dose of 2250 mg once followed by a maintenance dose of vancomycin 1250 mg IV every 24 hours  Desired empiric serum trough concentration is 15 to 20 mcg/mL  Draw vancomycin trough level 60 min prior to fourth dose on 3/19 at approximately 0200  Pharmacy will continue to follow and monitor vancomycin.      Please contact pharmacy at extension [phone] with any questions regarding this assessment.     Thank you for the consult,   Lacey Dias PharmD       Patient brief summary:  Ravi Zamorano is a 69 y.o. male initiated on antimicrobial therapy with IV Vancomycin for treatment of suspected lower respiratory infection    Drug Allergies:   Review of patient's allergies indicates:   Allergen Reactions    Nifedipine Other (See Comments)     Gingival hyperplasia       Actual Body Weight:   91.6 kg    Renal Function:   Estimated Creatinine Clearance: 31.5 mL/min (A) (based on SCr of 2.5 mg/dL (H)).    Dialysis Method (if applicable):  N/A    CBC (last 72 hours):  Recent Labs   Lab Result Units 03/16/25  0140   WBC K/uL 13.98*   Hemoglobin g/dL 9.9*   Hematocrit % 32.7*   Platelets K/uL 153   Gran % % 85.4*   Lymph % % 1.8*   Mono % % 11.1   Eosinophil % % 0.1   Basophil % % 0.1   Differential Method  Automated       Metabolic Panel (last 72 hours):  Recent Labs   Lab Result Units 03/16/25  0140 03/16/25  0357   Sodium mmol/L 131*  --    Potassium mmol/L 2.8*  --    Chloride mmol/L 99  --    CO2 mmol/L 20*  --    Glucose mg/dL 197*  --    Glucose, UA   --  4+*   BUN mg/dL 39*  --    Creatinine mg/dL 2.5*  --    Albumin g/dL 3.1*  --    Total Bilirubin mg/dL 0.3  --    Alkaline Phosphatase U/L 43  --    AST U/L 9*  --    ALT U/L <5*  --    Magnesium mg/dL 1.8  --    Phosphorus mg/dL 2.0*  --        Drug levels (last 3 results):  No results for input(s): "VANCOMYCINRA", "VANCORANDOM", "VANCOMYCINPE", " ""VANCOPEAK", "VANCOMYCINTR", "VANCOTROUGH" in the last 72 hours.    Microbiologic Results:  Microbiology Results (last 7 days)       Procedure Component Value Units Date/Time    Culture, Respiratory with Gram Stain [[id number]]     Order Status: No result Specimen: Respiratory     MRSA Screen by PCR [[id number]]     Order Status: No result Specimen: Nasal Swab     Blood culture x two cultures. Draw prior to antibiotics. [[id number]] Collected: 03/16/25 0217    Order Status: Sent Specimen: Blood from Peripheral, Hand, Right Updated: 03/16/25 0225    Influenza A & B by Molecular [[id number]] Collected: 03/16/25 0142    Order Status: Completed Specimen: Nasopharyngeal Swab Updated: 03/16/25 0222     Influenza A, Molecular Negative     Influenza B, Molecular Negative     Flu A & B Source Nasal swab    Blood culture x two cultures. Draw prior to antibiotics. [[id number]] Collected: 03/16/25 0139    Order Status: Sent Specimen: Blood from Peripheral, Upper Arm, Left Updated: 03/16/25 0200            "

## 2025-03-16 NOTE — ED PROVIDER NOTES
Encounter Date: 3/16/2025       History     Chief Complaint   Patient presents with    Fever     Kidney transplant 2016. Fever 102 at home yesterday     69-year-old male with a past medical history of a flutter status post ablation and on blood thinners, cardioembolic stroke, CKD status post kidney transplant, hypertension, arthritis and type 2 diabetes presents with a chief complaint of fever.  The patient's wife who is with him says that she recorded a fever of 102 at home before coming into the hospital.  The patient says that he has felt generally weak but otherwise denies any symptoms.  He says that he has not had any cough, shortness of breath, abdominal pain, nausea, vomiting, diarrhea, rash or dysuria.  He says that he was recently hospitalized in the last couple of months and has been wearing a cardiac monitor.  He denies any sick contacts.  He says that he has been compliant with all of his medications.    The history is provided by the patient. No  was used.     Review of patient's allergies indicates:   Allergen Reactions    Nifedipine Other (See Comments)     Gingival hyperplasia     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    CKD (chronic kidney disease) stage 4, GFR 15-29 ml/min 07/29/2014    Colon polyps 2014    Depression - situational 07/29/2014    Diabetes type 2 since 2000 07/29/2014    Diabetic neuropathy 07/29/2014    Encounter for blood transfusion     History of cardioversion 10/03/2019    History of hepatitis C, s/p successful Rx w/ SVR24 - 9/2017 07/29/2014    Genotype 1a, treatment naive 10/2014 liver biopsy - grade 1 / stage 1 Completed Harvoni w/ SVR    Hyperlipidemia 07/29/2014    Hypertension     Neuropathy     Organ transplant candidate 07/29/2014    Pancreatitis     S/P cadaveric kidney transplant  11/5/2016. ESRD secondary to HTN/DMII 11/05/2016    Type 2 diabetes mellitus with stage 3a chronic kidney disease, with long-term current use of insulin 07/29/2014     Past Surgical History:   Procedure Laterality Date    ABLATION OF ARRHYTHMOGENIC FOCUS FOR ATRIAL FIBRILLATION N/A 6/11/2024    Procedure: Ablation atrial fibrillation;  Surgeon: Bronson Bowden MD;  Location: [facility] EP LAB;  Service: Cardiology;  Laterality: N/A;  AF, FELICIANO (cx if SR), PVI, RFA, Carto, Gen, GP, 3 Prep    ABLATION, ATRIAL FLUTTER, TYPICAL  6/11/2024    Procedure: Ablation, Atrial Flutter, Typical;  Surgeon: Bronson Bowden MD;  Location: [facility] EP LAB;  Service: Cardiology;;    APPENDECTOMY      BONE MARROW BIOPSY Left 6/26/2024    Procedure: Biopsy-bone marrow;  Surgeon: Bridger Zapata MD;  Location: [facility] ENDO (4TH FLR);  Service: Oncology;  Laterality: Left;  6/24-pt knows to hold aspirin and eliquis as instructed by hem/onc, precall complete-Kpvt    CARDIOVERSION  6/11/2024    Procedure: Cardioversion;  Surgeon: Bronson Bowden MD;  Location: [facility] EP LAB;  Service: Cardiology;;    CATARACT EXTRACTION W/  INTRAOCULAR LENS IMPLANT Right 3/13/2024    Procedure: EXTRACTION, CATARACT, WITH IOL INSERTION;  Surgeon: Jennifer Palacio MD;  Location: [facility] OR;  Service: Ophthalmology;  Laterality: Right;    CATARACT EXTRACTION W/  INTRAOCULAR LENS IMPLANT Left 4/10/2024    Procedure: EXTRACTION, CATARACT, WITH IOL INSERTION;  Surgeon: Jennifer Palacio MD;  Location: [facility] OR;  Service: Ophthalmology;  Laterality: Left;    COLONOSCOPY N/A 12/21/2020    Procedure: COLONOSCOPY;  Surgeon: Tico Bell MD;  Location: [facility] ENDO (4TH FLR);  Service: Endoscopy;  Laterality: N/A;  ok to hold eliquis per Dr. Valadez, see telephone encounter 11/13/2020-MS  covid test 12/18 [place]    ECHOCARDIOGRAM,TRANSESOPHAGEAL N/A 2/3/2025    Procedure: Transesophageal echo (FELICIANO) intra-procedure log documentation;  Surgeon: Grayson Lin III, MD;   Location: [facility] EP LAB;  Service: Cardiology;  Laterality: N/A;    KIDNEY TRANSPLANT      TRANSESOPHAGEAL ECHOCARDIOGRAPHY N/A 8/26/2019    Procedure: ECHOCARDIOGRAM, TRANSESOPHAGEAL;  Surgeon: Dolores Diagnostic Provider;  Location: [facility] EP LAB;  Service: Cardiology;  Laterality: N/A;    TRANSESOPHAGEAL ECHOCARDIOGRAPHY N/A 6/11/2024    Procedure: ECHOCARDIOGRAM, TRANSESOPHAGEAL;  Surgeon: Grayson Lin III, MD;  Location: [facility] EP LAB;  Service: Cardiology;  Laterality: N/A;    TREATMENT OF CARDIAC ARRHYTHMIA N/A 8/26/2019    Procedure: CARDIOVERSION;  Surgeon: Bronson Bowden MD;  Location: [facility] EP LAB;  Service: Cardiology;  Laterality: N/A;  AF, FELICIANO, DCCV, MAC, GP, 3 PREP    TREATMENT OF CARDIAC ARRHYTHMIA N/A 2/3/2025    Procedure: Cardioversion or Defibrillation;  Surgeon: Bronson Bowden MD;  Location: [facility] EP LAB;  Service: Cardiology;  Laterality: N/A;  AF, FELICIANO, DCCV, MAC, GP, 3 Prep     Family History   Problem Relation Name Age of Onset    Diabetes Mother      Hypertension Mother      Heart disease Mother          CAD with PCI    Heart disease Father      Cancer Brother 2         one sister with breast cancer    Hypertension Brother 2         one sister with HTN and borderline DM    Stroke Neg Hx      Kidney disease Neg Hx      Colon cancer Neg Hx      Esophageal cancer Neg Hx       Social History[1]  Review of Systems    Physical Exam     Initial Vitals [03/16/25 0056]   BP Pulse Resp Temp SpO2   (!) 140/63 70 16 98.8 °F (37.1 °C) 96 %      MAP       --         Physical Exam    Nursing note and vitals reviewed.  Constitutional: He appears well-developed and well-nourished. He is not diaphoretic. No distress.   HENT:   Head: Normocephalic and atraumatic.   Eyes: Pupils are equal, round, and reactive to light.   Neck: Neck supple.   Cardiovascular:  Normal rate, regular rhythm, normal heart sounds and intact distal pulses.           Pulmonary/Chest: Breath sounds normal. He has no wheezes. He has no  rhonchi. He has no rales.   Abdominal: Abdomen is soft. There is no abdominal tenderness. There is no rebound and no guarding.   Musculoskeletal:         General: No tenderness or edema. Normal range of motion.      Cervical back: Neck supple.     Neurological: He is alert and oriented to person, place, and time. He has normal strength.   Skin: Skin is warm and dry. Capillary refill takes less than 2 seconds. No rash noted. No erythema. No pallor.         ED Course   Procedures  Labs Reviewed   CBC W/ AUTO DIFFERENTIAL - Abnormal       Result Value    WBC 13.98 (*)     RBC 4.29 (*)     Hemoglobin 9.9 (*)     Hematocrit 32.7 (*)     MCV 76 (*)     MCH 23.1 (*)     MCHC 30.3 (*)     RDW 16.2 (*)     Platelets 153      MPV SEE COMMENT      Immature Granulocytes 1.5 (*)     Gran # (ANC) 11.9 (*)     Immature Grans (Abs) 0.21 (*)     Lymph # 0.3 (*)     Mono # 1.6 (*)     Eos # 0.0      Baso # 0.02      nRBC 0      Gran % 85.4 (*)     Lymph % 1.8 (*)     Mono % 11.1      Eosinophil % 0.1      Basophil % 0.1      Differential Method Automated     COMPREHENSIVE METABOLIC PANEL - Abnormal    Sodium 131 (*)     Potassium 2.8 (*)     Chloride 99      CO2 20 (*)     Glucose 197 (*)     BUN 39 (*)     Creatinine 2.5 (*)     Calcium 8.3 (*)     Total Protein 6.0      Albumin 3.1 (*)     Total Bilirubin 0.3      Alkaline Phosphatase 43      AST 9 (*)     ALT <5 (*)     eGFR 27.5 (*)     Anion Gap 12     URINALYSIS, REFLEX TO URINE CULTURE - Abnormal    Specimen UA Urine, Clean Catch      Color, UA Yellow      Appearance, UA Clear      pH, UA 6.0      Specific Gravity, UA 1.015      Protein, UA 3+ (*)     Glucose, UA 4+ (*)     Ketones, UA Negative      Bilirubin (UA) Negative      Occult Blood UA Negative      Nitrite, UA Negative      Leukocytes, UA Negative      Narrative:     Specimen Source->Urine   B-TYPE NATRIURETIC PEPTIDE - Abnormal    BNP 1,766 (*)    TROPONIN I HIGH SENSITIVITY - Abnormal    Troponin I High Sensitivity  "178 (*)    PHOSPHORUS - Abnormal    Phosphorus 2.0 (*)    TROPONIN I HIGH SENSITIVITY - Abnormal    Troponin I High Sensitivity 186 (*)    POCT GLUCOSE, HAND-HELD DEVICE - Abnormal    POC Glucose 222 (*)    POCT GLUCOSE - Abnormal    POCT Glucose 222 (*)    INFLUENZA A & B BY MOLECULAR    Influenza A, Molecular Negative      Influenza B, Molecular Negative      Flu A & B Source Nasal swab     CULTURE, BLOOD   CULTURE, BLOOD   MRSA SCREEN BY PCR   CULTURE, RESPIRATORY   MAGNESIUM    Magnesium 1.8     SARS-COV-2 RNA AMPLIFICATION, QUAL    SARS-CoV-2 RNA, Amplification, Qual Negative     URINALYSIS MICROSCOPIC    RBC, UA 2      WBC, UA 1      Bacteria Rare      Yeast, UA None      Hyaline Casts, UA 0      Microscopic Comment SEE COMMENT      Narrative:     Specimen Source->Urine   TROPONIN I HIGH SENSITIVITY   BASIC METABOLIC PANEL   PHOSPHORUS   TACROLIMUS LEVEL   POCT GLUCOSE, HAND-HELD DEVICE          Imaging Results              X-Ray Chest AP Portable (Final result)  Result time 03/16/25 06:46:09      Final result by Alex Morton MD (03/16/25 06:46:09)                   Impression:      New right upper lobe airspace disease suggestive of pneumonia or aspiration.  Suggest correlation with clinical findings and follow-up radiographs in 4-6 weeks to ensure resolution.    Mild cardiomegaly and central vascular congestion.      Electronically signed by: Alex Morton MD  Date:    03/16/2025  Time:    06:46               Narrative:    EXAMINATION:  XR CHEST AP PORTABLE    CLINICAL HISTORY:  Provided history is "Sepsis;  ".    TECHNIQUE:  One view of the chest.    COMPARISON:  02/20/2025.    FINDINGS:  Cardiac wires and other support devices overlie the chest.  Cardiomediastinal silhouette is enlarged.  New airspace disease and consolidation in the right upper lobe, partially obscured by overlapping monitoring device.  Central vascular congestion and coarse interstitial lung markings.  No additional confluent " area of consolidation.  No large pleural effusion.  No pneumothorax.                                       Medications   piperacillin-tazobactam (ZOSYN) 4.5 g in D5W 100 mL IVPB (MB+) (0 g Intravenous Stopped 3/16/25 0300)   sodium chloride 0.9% flush 10 mL (has no administration in time range)   melatonin tablet 6 mg (has no administration in time range)   potassium chloride 10 mEq in 100 mL IVPB (10 mEq Intravenous New Bag 3/16/25 0614)   vancomycin - pharmacy to dose (has no administration in time range)   atorvastatin tablet 80 mg (80 mg Oral Given 3/16/25 0727)   doxazosin tablet 4 mg (4 mg Oral Given 3/16/25 0725)   empagliflozin (Jardiance) tablet 10 mg (has no administration in time range)   ergocalciferol capsule 50,000 Units (50,000 Units Oral Given 3/16/25 0804)   gabapentin capsule 100 mg (100 mg Oral Given 3/16/25 0805)   hydroCHLOROthiazide tablet 12.5 mg (12.5 mg Oral Given 3/16/25 0727)   hydrALAZINE tablet 100 mg (100 mg Oral Given 3/16/25 0617)   rivaroxaban tablet 15 mg (has no administration in time range)   tacrolimus capsule 0.5 mg (0.5 mg Oral Given 3/16/25 0726)   vancomycin 1,250 mg in 0.9% NaCl 250 mL IVPB (admixture device) (has no administration in time range)   sodium chloride 0.9% flush 10 mL (has no administration in time range)   naloxone 0.4 mg/mL injection 0.02 mg (has no administration in time range)   glucose chewable tablet 16 g (has no administration in time range)   glucose chewable tablet 24 g (has no administration in time range)   dextrose 50% injection 12.5 g (has no administration in time range)   dextrose 50% injection 25 g (has no administration in time range)   glucagon (human recombinant) injection 1 mg (has no administration in time range)   insulin aspart U-100 pen 0-5 Units (has no administration in time range)   k phos di & mono-sod phos mono 250 mg tablet 2 tablet (2 tablets Oral Given 3/16/25 0804)   insulin glargine U-100 (Lantus) pen 15 Units (15 Units  Subcutaneous Given 3/16/25 0733)   vancomycin (VANCOCIN) 2,250 mg in 0.9% NaCl 500 mL IVPB (0 mg Intravenous Stopped 3/16/25 0536)   acetaminophen tablet 1,000 mg (1,000 mg Oral Given 3/16/25 0225)   potassium bicarbonate disintegrating tablet 40 mEq (40 mEq Oral Given 3/16/25 0354)   magnesium sulfate 2g in water 50mL IVPB (premix) (0 g Intravenous Stopped 3/16/25 0605)   potassium bicarbonate disintegrating tablet 25 mEq (25 mEq Oral Given 3/16/25 0726)     Medical Decision Making  See ED course for remainder of care    Amount and/or Complexity of Data Reviewed  Labs: ordered.  Radiology: ordered. Decision-making details documented in ED Course.  ECG/medicine tests:  Decision-making details documented in ED Course.    Risk  OTC drugs.  Prescription drug management.  Decision regarding hospitalization.               ED Course as of 03/16/25 0806   Sun Mar 16, 2025   0056 69-year-old male in no acute distress.  Patient is mildly hypertensive, vital signs are otherwise within normal limits.  Physical exam is unremarkable.  Differential includes but is not limited to sepsis versus pneumonia versus URI versus UTI versus ACS [BP]   0206 X-Ray Chest AP Portable  Right upper lobe infiltrate that has new [BP]   0320 Troponin I High Sensitivity(!): 186  Mildly increased from the patient's baseline, patient is denying any current chest pain or equivalent [BP]   0451 EKG 12-lead  Underlying rhythm appears to be atrial flutter, ventricular rate of 106 beats per minute with bigeminal PVCs versus possible left bundle-branch morphology, no STEMI on my independent review [BP]   0700 The patient was ultimately admitted to Hospital Medicine for pneumonia in an immunocompromised patient.  Patient was agreeable to admission [BP]      ED Course User Index  [BP] Peter Cleveland MD                           Clinical Impression:  Final diagnoses:  [Z13.6] Screening for cardiovascular condition  [J18.9] Pneumonia of right upper lobe due to  infectious organism (Primary)  [Z94.0] History of renal transplantation          ED Disposition Condition    Admit Stable                  [1]   Social History  Tobacco Use    Smoking status: Former     Current packs/day: 0.00     Average packs/day: 0.5 packs/day for 32.0 years (16.0 ttl pk-yrs)     Types: Cigarettes     Start date: 1984     Quit date: 2016     Years since quittin.3    Smokeless tobacco: Never   Substance Use Topics    Alcohol use: Yes     Comment: Pt reports occasional beers on Sundays. Pt reports drinking daily prior to ESRD diagnosis.    Drug use: No        Peter Cleveland MD  Resident  [phone]

## 2025-03-16 NOTE — ED NOTES
Assumed care of patient at this time. Pt arrive to ED with a complaint of weakness, fever.Pt placed in hospital gown and currently lying in stretcher. Vital signs are stable, provided pt with warm blanket. Pt denied restroom use. No other complaints from pt at this time.     Review of patient's allergies indicates:   Allergen Reactions    Nifedipine Other (See Comments)     Gingival hyperplasia     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    CKD (chronic kidney disease) stage 4, GFR 15-29 ml/min 07/29/2014    Colon polyps 2014    Depression - situational 07/29/2014    Diabetes type 2 since 2000 07/29/2014    Diabetic neuropathy 07/29/2014    Encounter for blood transfusion     History of cardioversion 10/03/2019    History of hepatitis C, s/p successful Rx w/ SVR24 - 9/2017 07/29/2014    Genotype 1a, treatment naive 10/2014 liver biopsy - grade 1 / stage 1 Completed Harvoni w/ SVR    Hyperlipidemia 07/29/2014    Hypertension     Neuropathy     Organ transplant candidate 07/29/2014    Pancreatitis     S/P cadaveric kidney transplant 11/5/2016. ESRD secondary to HTN/DMII 11/05/2016    Type 2 diabetes mellitus with stage 3a chronic kidney disease, with long-term current use of insulin 07/29/2014

## 2025-03-16 NOTE — PROGRESS NOTES
Kidney Transplant  Consult       Reason for Follow-up: Reassessment of Kidney Transplant - 11/5/2016  (#1) recipient and management of immunosuppression.         Subjective:   History of Present Illness:  HPI: 70 yo M with extensive history notable for kidney transplant (2016) with CKD 3-4, chronic combined CHF (FELICIANO 2/3/25 EF 40-45%, CVA right MCA 12/24, atrial fib/flutter s/p DCCV 02/03/2025 who presents to the ED for a 102F fever at home. He reports a wet cough that started the night of his admission. He was recently hospitalized for syncope last month and discharged on a 30 day cardiac monitor.        Na 131, K 2.8, BUN 39, Cr 2.5 (baseline). At 3:13AM,   CXR was revealing for RUL consolidation concerning for hospital acquired pneumonia given his recent hospitalization. He was admitted for pneumonia and placed on broad spectrum antibiotics.         Scheduled Meds:   atorvastatin  80 mg Oral Daily    doxazosin  4 mg Oral Daily    empagliflozin  10 mg Oral Daily    epoetin tanya  4,000 Units Subcutaneous Q7 Days    ergocalciferol  50,000 Units Oral Q7 Days    gabapentin  100 mg Oral TID    hydrALAZINE  100 mg Oral Q8H    insulin glargine U-100 (Lantus)  15 Units Subcutaneous QAM    k phos di & mono-sod phos mono  500 mg Oral TID    piperacillin-tazobactam (Zosyn) IV (PEDS and ADULTS) (extended infusion is not appropriate)  4.5 g Intravenous Q8H    rivaroxaban  15 mg Oral Daily with dinner    tacrolimus  0.5 mg Oral Daily AM    [START ON 3/17/2025] vancomycin (VANCOCIN) IV (PEDS and ADULTS)  15 mg/kg Intravenous Q24H     Continuous Infusions:  PRN Meds:  Current Facility-Administered Medications:     dextrose 50%, 12.5 g, Intravenous, PRN    dextrose 50%, 25 g, Intravenous, PRN    glucagon (human recombinant), 1 mg, Intramuscular, PRN    glucose, 16 g, Oral, PRN    glucose, 24 g, Oral, PRN    insulin aspart U-100, 0-5 Units, Subcutaneous, QID (AC + HS) PRN    melatonin, 6 mg, Oral, Nightly PRN    naloxone, 0.02 mg,  Intravenous, PRN    sodium chloride 0.9%, 10 mL, Intravenous, Q12H PRN    Pharmacy to dose Vancomycin consult, , , Once **AND** vancomycin - pharmacy to dose, , Intravenous, pharmacy to manage frequency    Intake/Output - Last 3 Shifts         03/14 0700  03/15 0659 03/15 0700  03/16 0659 03/16 0700  03/17 0659    IV Piggyback   198.8    Total Intake(mL/kg)   198.8 (2.2)    Urine (mL/kg/hr)   400 (0.8)    Total Output   400    Net   -201.2                    Review of Systems   Constitutional:  Positive for chills, fatigue and fever.   Respiratory:  Positive for cough. Negative for chest tightness and shortness of breath.    Cardiovascular:  Negative for chest pain.   Gastrointestinal:  Negative for abdominal pain and constipation.   Genitourinary:  Negative for dysuria and flank pain.     Objective:     Vital Signs (Most Recent):  Temp: 97.8 °F (36.6 °C) (03/16/25 1104)  Pulse: 77 (03/16/25 1202)  Resp: 20 (03/16/25 1202)  BP: (!) 180/80 (03/16/25 1202)  SpO2: 98 % (03/16/25 1202) Vital Signs (24h Range):  Temp:  [97.8 °F (36.6 °C)-99.9 °F (37.7 °C)] 97.8 °F (36.6 °C)  Pulse:  [] 77  Resp:  [16-20] 20  SpO2:  [96 %-98 %] 98 %  BP: (140-180)/(63-85) 180/80     Weight: 91.6 kg (202 lb)  Height: 6' (182.9 cm)  Body mass index is 27.4 kg/m².     Physical Exam    Cardiovascular:      Rate and Rhythm: Normal rate and regular rhythm.   Pulmonary:      Effort: Pulmonary effort is normal. No respiratory distress.      Breath sounds: Rales present. No wheezing.   Abdominal:      General: There is distension.      Palpations: Abdomen is soft.      Tenderness: There is no abdominal tenderness. There is no guarding or rebound.   Neurological:      Mental Status: He is alert. Mental status is at baseline.     Laboratory:  Labs within the past 24 hours have been reviewed.      Assessment/Plan:     S/P cadaveric kidney transplant 11/5/2016. ESRD secondary to HTN/DMII  - Baseline Cr 1.9-2.5  - Home IS meds:  mycophenolate  mofetil, prednisone and tacrolimus       - Cr at baseline  - will hold MMF due to infection  - Continue prograf and prednisone  - Check tac level daily. Target level 4-6   - Avoid nephrotoxic med   - renally dose antibiotic           Radha Kennedy MD  Kidney Transplant

## 2025-03-16 NOTE — ASSESSMENT & PLAN NOTE
18 beats of monomorphic, wide complex tachycardia noted on 3/16/25 around 3AM  Repeat ECG  Cardiac tele  Replace K & Mg PRN  Consider Cards consult

## 2025-03-16 NOTE — ASSESSMENT & PLAN NOTE
Creatine stable for now. BMP reviewed- noted Estimated Creatinine Clearance: 31.5 mL/min (A) (based on SCr of 2.5 mg/dL (H)). according to latest data. Based on current GFR, CKD stage is stage 3 - GFR 30-59.  Monitor UOP and serial BMP and adjust therapy as needed. Renally dose meds. Avoid nephrotoxic medications and procedures.    -cont home Jardiance  -glargine 15 units in AM  -low dose sliding scale insulin  -diabetic diet  -BG goal 140-180

## 2025-03-16 NOTE — ASSESSMENT & PLAN NOTE
- Baseline Cr 1.9-2.5  - Home IS meds:  mycophenolate mofetil, prednisone and tacrolimus       - Cr at baseline  - will hold MMF due to infection  - Continue prograf and prednisone  - Check tac level daily. Target level 4-6   - Avoid nephrotoxic med   - renally dose antibiotic

## 2025-03-16 NOTE — ASSESSMENT & PLAN NOTE
Most recent BP: (!) 180/80 (03/16/25 1202); 24 hr range BP  Min: 140/63  Max: 180/80  Current meds: doxazosin tablet 4 mg, Daily, Oral  hydrALAZINE tablet 100 mg, Every 8 hours, Oral  Adjust regimen PRN goal SBP <180  Hold ARB, diuretic in s/o PHUONG

## 2025-03-16 NOTE — SUBJECTIVE & OBJECTIVE
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    CKD (chronic kidney disease) stage 4, GFR 15-29 ml/min 07/29/2014    Colon polyps 2014    Depression - situational 07/29/2014    Diabetes type 2 since 2000 07/29/2014    Diabetic neuropathy 07/29/2014    Encounter for blood transfusion     History of cardioversion 10/03/2019    History of hepatitis C, s/p successful Rx w/ SVR24 - 9/2017 07/29/2014    Genotype 1a, treatment naive 10/2014 liver biopsy - grade 1 / stage 1 Completed Harvoni w/ SVR    Hyperlipidemia 07/29/2014    Hypertension     Neuropathy     Organ transplant candidate 07/29/2014    Pancreatitis     S/P cadaveric kidney transplant 11/5/2016. ESRD secondary to HTN/DMII 11/05/2016    Type 2 diabetes mellitus with stage 3a chronic kidney disease, with long-term current use of insulin 07/29/2014       Past Surgical History:   Procedure Laterality Date    ABLATION OF ARRHYTHMOGENIC FOCUS FOR ATRIAL FIBRILLATION N/A 6/11/2024    Procedure: Ablation atrial fibrillation;  Surgeon: Bronson Bowden MD;  Location: [facility] EP LAB;  Service: Cardiology;  Laterality: N/A;  AF, FELICIANO (cx if SR), PVI, RFA, Carto, Gen, GP, 3 Prep    ABLATION, ATRIAL FLUTTER, TYPICAL  6/11/2024    Procedure: Ablation, Atrial Flutter, Typical;  Surgeon: Bronson Bowden MD;  Location: [facility] EP LAB;  Service: Cardiology;;    APPENDECTOMY      BONE MARROW BIOPSY Left 6/26/2024    Procedure: Biopsy-bone marrow;  Surgeon: Bridger Zapata MD;  Location: [facility] ENDO ([address] [place] [address]);  Service: Oncology;  Laterality: Left;  6/24-pt knows to hold aspirin and eliquis as instructed by hem/onc, precall complete-Kpvt    CARDIOVERSION  6/11/2024    Procedure: Cardioversion;  Surgeon: Bronson Bowden MD;  Location: [facility] EP LAB;  Service: Cardiology;;    CATARACT EXTRACTION W/  INTRAOCULAR LENS IMPLANT  Right 3/13/2024    Procedure: EXTRACTION, CATARACT, WITH IOL INSERTION;  Surgeon: Jennifer Palacio MD;  Location: [facility] OR;  Service: Ophthalmology;  Laterality: Right;    CATARACT EXTRACTION W/  INTRAOCULAR LENS IMPLANT Left 4/10/2024    Procedure: EXTRACTION, CATARACT, WITH IOL INSERTION;  Surgeon: Jennifer Palacio MD;  Location: [facility] OR;  Service: Ophthalmology;  Laterality: Left;    COLONOSCOPY N/A 12/21/2020    Procedure: COLONOSCOPY;  Surgeon: Tico Bell MD;  Location: [facility] ENDO (4TH FLR);  Service: Endoscopy;  Laterality: N/A;  ok to hold eliquis per Dr. Valadez, see telephone encounter 11/13/2020-MS  covid test 12/18 [place]    ECHOCARDIOGRAM,TRANSESOPHAGEAL N/A 2/3/2025    Procedure: Transesophageal echo (FELICIANO) intra-procedure log documentation;  Surgeon: Grayson Lin III, MD;  Location: [facility] EP LAB;  Service: Cardiology;  Laterality: N/A;    KIDNEY TRANSPLANT      TRANSESOPHAGEAL ECHOCARDIOGRAPHY N/A 8/26/2019    Procedure: ECHOCARDIOGRAM, TRANSESOPHAGEAL;  Surgeon: [facility];  Location: [facility] EP LAB;  Service: Cardiology;  Laterality: N/A;    TRANSESOPHAGEAL ECHOCARDIOGRAPHY N/A 6/11/2024    Procedure: ECHOCARDIOGRAM, TRANSESOPHAGEAL;  Surgeon: Grayson Lin III, MD;  Location: [facility] EP LAB;  Service: Cardiology;  Laterality: N/A;    TREATMENT OF CARDIAC ARRHYTHMIA N/A 8/26/2019    Procedure: CARDIOVERSION;  Surgeon: Bronson Bowden MD;  Location: [facility] EP LAB;  Service: Cardiology;  Laterality: N/A;  AF, FELICIANO, DCCV, MAC, GP, 3 PREP    TREATMENT OF CARDIAC ARRHYTHMIA N/A 2/3/2025    Procedure: Cardioversion or Defibrillation;  Surgeon: Bronson Bowden MD;  Location: [facility] EP LAB;  Service: Cardiology;  Laterality: N/A;  AF, FELICIANO, DCCV, MAC, GP, 3 Prep       Review of patient's allergies indicates:   Allergen Reactions    Nifedipine Other (See Comments)     Gingival hyperplasia       Current Facility-Administered Medications on File Prior to Encounter   Medication    balanced salt  irrigation intra-ocular solution 1 drop    epoetin tanya injection 4,000 Units    phenylephrine HCL 10% ophthalmic solution 1 drop    proparacaine 0.5 % ophthalmic solution 1 drop    sodium chloride 0.9% flush 10 mL    TETRAcaine HCl (PF) 0.5 % Drop 1 drop     Current Outpatient Medications on File Prior to Encounter   Medication Sig    atorvastatin (LIPITOR) 80 MG tablet Take 1 tablet (80 mg total) by mouth once daily.    blood sugar diagnostic Strp Use to check blood glucose 1 times daily, to use with insurance preferred meter    blood-glucose meter Misc Use to check blood glucose 1 times daily, to use with insurance preferred meter    blood-glucose sensor Kayla Gin 3, use as directed, change every 14 days , e 11.65    doxazosin (CARDURA) 4 MG tablet Take 1 tablet (4 mg total) by mouth once daily.    empagliflozin (JARDIANCE) 10 mg tablet Take 1 tablet (10 mg total) by mouth once daily.    ergocalciferol (ERGOCALCIFEROL) 50,000 unit Cap Take 1 capsule (50,000 Units total) by mouth every 7 days.    furosemide (LASIX) 40 MG tablet Take 2 tablets (80 mg total) by mouth daily as needed (for swelling).    gabapentin (NEURONTIN) 300 MG capsule Take 1 capsule by mouth in the morning, 1 capsule midday, and 3 capsules at night.    glucagon (GVOKE HYPOPEN 2-PACK) 1 mg/0.2 mL AtIn Inject 0.2 mLs into the skin as needed (severe hypoglycemia).    hydrALAZINE (APRESOLINE) 100 MG tablet Take 1 tablet (100 mg total) by mouth every 8 (eight) hours.    hydroCHLOROthiazide 12.5 MG Tab Take 1 tablet (12.5 mg total) by mouth once daily.    insulin aspart U-100 (NOVOLOG U-100 INSULIN ASPART) 100 unit/mL injection Use in pump, max daily 100 units.    insulin aspart U-100 (NOVOLOG) 100 unit/mL (3 mL) InPn pen Inject 10 units under the skin w/ breakfast, 7 units at lunch and dinner. Scale 180-230+2, 231-280+4, 281-330+6, 331-380+8, >380+10.  Max daily 57 units.    insulin glargine U-100, Lantus, (LANTUS SOLOSTAR U-100 INSULIN) 100 unit/mL  "(3 mL) InPn pen Inject 20 Units into the skin every morning.    insulin  cart,aut,G6/7,cntr (OMNIPOD 5 G6-G7 INTRO KT,GEN5,) Crtg Use as directed.    insulin pump cart,auto,BT,G6/7 (OMNIPOD 5 G6-G7 PODS, GEN 5,) Crtg Change every 48 hours.    lancets 30 gauge Misc Use to check blood glucose 1 times daily, to use with insurance preferred meter    magnesium oxide (MAG-OX) 400 mg (241.3 mg magnesium) tablet Take 1 tablet (400 mg total) by mouth 2 (two) times daily.    meclizine (ANTIVERT) 25 mg tablet Take 1 tablet (25 mg total) by mouth 3 (three) times daily as needed for Dizziness.    mycophenolate (CELLCEPT) 250 mg Cap Take 2 capsules (500 mg total) by mouth 2 (two) times daily.    pen needle, diabetic (BD ULTRA-FINE LO PEN NEEDLE) 32 gauge x 5/32" Ndle USE TO ADMINISTER INSULIN 4 TIMES DAILY.    predniSONE (DELTASONE) 5 MG tablet Take 1 tablet (5 mg total) by mouth once daily.    rivaroxaban (XARELTO) 15 mg Tab Take 1 tablet (15 mg total) by mouth daily with dinner or evening meal.    tacrolimus (PROGRAF) 0.5 MG Cap Take 1 capsule (0.5 mg total) by mouth once daily. Start tomorrow    valsartan (DIOVAN) 320 MG tablet Take 1 tablet (320 mg total) by mouth once daily. HOLD until follow up with kidney doctor    [DISCONTINUED] insulin lispro (HUMALOG KWIKPEN INSULIN) 100 unit/mL pen Inject 18 units w/ breakfast, 16 units w/ lunch and dinner plus scale 150-200 +2, 201-250 +4, 251-300 +6, 301-350 +8.     Family History       Problem Relation (Age of Onset)    Cancer Brother    Diabetes Mother    Heart disease Mother, Father    Hypertension Mother, Brother          Tobacco Use    Smoking status: Former     Current packs/day: 0.00     Average packs/day: 0.5 packs/day for 32.0 years (16.0 ttl pk-yrs)     Types: Cigarettes     Start date: 1984     Quit date: 2016     Years since quittin.3    Smokeless tobacco: Never   Substance and Sexual Activity    Alcohol use: Yes     Comment: Pt reports occasional " beers on Sundays. Pt reports drinking daily prior to ESRD diagnosis.    Drug use: No    Sexual activity: Yes     Partners: Female     Review of Systems   Constitutional:  Positive for chills, fatigue and fever.   Respiratory:  Positive for cough. Negative for chest tightness and shortness of breath.    Cardiovascular:  Negative for chest pain.   Gastrointestinal:  Negative for abdominal pain and constipation.   Genitourinary:  Negative for dysuria and flank pain.     Objective:     Vital Signs (Most Recent):  Temp: 99.9 °F (37.7 °C) (03/16/25 0225)  Pulse: 89 (03/16/25 0316)  Resp: 20 (03/16/25 0316)  BP: (!) 153/70 (03/16/25 0200)  SpO2: 96 % (03/16/25 0316) Vital Signs (24h Range):  Temp:  [98.8 °F (37.1 °C)-99.9 °F (37.7 °C)] 99.9 °F (37.7 °C)  Pulse:  [] 89  Resp:  [16-20] 20  SpO2:  [96 %] 96 %  BP: (140-153)/(63-70) 153/70     Weight: 91.6 kg (202 lb)  Body mass index is 27.4 kg/m².     Physical Exam  Constitutional:       General: He is not in acute distress.  HENT:      Head: Normocephalic and atraumatic.   Eyes:      Extraocular Movements: Extraocular movements intact.   Cardiovascular:      Rate and Rhythm: Normal rate and regular rhythm.   Pulmonary:      Effort: Pulmonary effort is normal. No respiratory distress.      Breath sounds: Rales present. No wheezing.      Comments: Faint RUL rales  Abdominal:      General: There is distension.      Palpations: Abdomen is soft.      Tenderness: There is no abdominal tenderness. There is no guarding or rebound.   Musculoskeletal:      Right lower leg: No edema.      Left lower leg: No edema.   Skin:     General: Skin is warm and dry.   Neurological:      Mental Status: He is alert. Mental status is at baseline.   Psychiatric:         Mood and Affect: Mood normal.         Behavior: Behavior normal.                Significant Labs: All pertinent labs within the past 24 hours have been reviewed.    Significant Imaging: I have reviewed all pertinent imaging  results/findings within the past 24 hours.

## 2025-03-16 NOTE — ASSESSMENT & PLAN NOTE
Patient's blood pressure range in the last 24 hours was: BP  Min: 140/63  Max: 153/70.The patient's inpatient anti-hypertensive regimen is listed below:  Current Antihypertensives       Plan  - BP is uncontrolled, will adjust as follows:    - cont home Hctz 12.5, hydralazine 100 q8, doxazosin 4

## 2025-03-16 NOTE — ASSESSMENT & PLAN NOTE
Continue home Gabapentin regimen as follows:   - morning and afternoon: 100 mg   - evenin mg qhs

## 2025-03-16 NOTE — ASSESSMENT & PLAN NOTE
Patient has a diagnosis of pneumonia. The cause of the pneumonia is suspected to be bacterial in etiology but organism is not known. The pneumonia is stable. The patient has the following signs/symptoms of pneumonia: cough and sputum production. The patient does not have a current oxygen requirement and the patient does not have a home oxygen requirement. I have reviewed the pertinent imaging. The following cultures have been collected: Blood cultures and Sputum culture The culture results are listed below.     Current antimicrobial regimen consists of the antibiotics listed below. Will monitor patient closely and Adjust treatment plan as follows      Antibiotics (From admission, onward)      Start     Stop Route Frequency Ordered    03/16/25 0230  vancomycin (VANCOCIN) 2,250 mg in 0.9% NaCl 500 mL IVPB  (ED Adult Sepsis Treatment)         -- IV Once 03/16/25 0158    03/16/25 0200  piperacillin-tazobactam (ZOSYN) 4.5 g in D5W 100 mL IVPB (MB+)  (ED Adult Sepsis Treatment)         03/17/25 0159 IV Every 8 hours (non-standard times) 03/16/25 0158            Microbiology Results (last 7 days)       Procedure Component Value Units Date/Time    MRSA Screen by PCR [[id number]]     Order Status: No result Specimen: Nasal Swab     Blood culture x two cultures. Draw prior to antibiotics. [[id number]] Collected: 03/16/25 0217    Order Status: Sent Specimen: Blood from Peripheral, Hand, Right Updated: 03/16/25 0225    Influenza A & B by Molecular [[id number]] Collected: 03/16/25 0142    Order Status: Completed Specimen: Nasopharyngeal Swab Updated: 03/16/25 0222     Influenza A, Molecular Negative     Influenza B, Molecular Negative     Flu A & B Source Nasal swab    Blood culture x two cultures. Draw prior to antibiotics. [[id number]] Collected: 03/16/25 0139    Order Status: Sent Specimen: Blood from Peripheral, Upper Arm, Left Updated: 03/16/25 0200          -flu negative  -RUL consolidation on CXR  -follow up MRSA  PCR  -follow up sputum cx  -continue Vanc/Zosyn  -discontinue Vanc if PCR neg

## 2025-03-16 NOTE — ASSESSMENT & PLAN NOTE
Elevated troponin on admission with no chest pressure/pain, sob, abdominal pain, nausea. It could be related to his underlying infection. Continue to monitor for symptoms. No indication for ACS at this time. ECG w/ some V5-6 changes; ischemia?  Repeat ECG  Cardiac tele  Replace K & Mg PRN  Consider Cards consult

## 2025-03-16 NOTE — Clinical Note
Diagnosis: Screening for cardiovascular condition [964839]   Future Attending Provider: DIANE FERNANDEZ [[id number]]   Reason for IP Medical Treatment  (Clinical interventions that can only be accomplished in the IP setting? ) :: IV antibiotics   Special Needs:: No Special Needs [1]

## 2025-03-16 NOTE — H&P
[facility] - Emergency Dept  [facility] Medicine  History & Physical    Patient Name: Ravi Zamorano  MRN: [id number]  Patient Class: IP- Inpatient  Admission Date: 3/16/2025  Attending Physician: Sandra Kern*   Primary Care Provider: Mima Mack MD         Patient information was obtained from patient and ER records.     Subjective:     Principal Problem:Hospital-acquired pneumonia    Chief Complaint:   Chief Complaint   Patient presents with    Fever     Kidney transplant 2016. Fever 102 at home yesterday        HPI: 70 yo M with extensive history notable for kidney transplant (2016) on immunosuppression with CKD, chronic combined CHF (FELICIANO 2/3/25 EF 40-45%, TTE 12/8/24 G3DD), CVA right MCA 12/24 with minimal residual LLE and left hand weakness, DM2, atrial fib/flutter s/p DCCV 02/03/2025 who presents to the ED for a 102F fever at home. He reports a wet cough that started the night of his admission. He was recently hospitalized for syncope last month and discharged on a 30 day cardiac monitor. Beta blocker held then due to iatrogenic bradycardia to the 20s. He is on MMF, Tacrolimus and Prednisone at home. He denies dysuria or suprapubic pain. He does not have any open wounds.     In the ED, he was afebrile, HR 70, /70. Labs remarkable for WBC 14, Hg 10 (baseline), Na 131, K 2.8, BUN 39, Cr 2.5 (baseline). At 3:13AM, he had an 18 beats of monomorphic, wide complex tachycardia before he had electrolyte repletion. He reported palpitations during the event that quickly resolved. He denied chest pressure/pain, abdominal pain, shortness of breath and nausea.  CXR was revealing for RUL consolidation concerning for hospital acquired pneumonia given his recent hospitalization. He was admitted for pneumonia and placed on broad spectrum antibiotics.     Past Medical History:   Diagnosis Date    Anticoagulant long-term use     Anxiety 07/29/2014    Arthritis     atrial fibrillation     Bilateral  diabetic retinopathy 2017    Cardioembolic stroke 12/07/2024    CKD (chronic kidney disease) stage 4, GFR 15-29 ml/min 07/29/2014    Colon polyps 2014    Depression - situational 07/29/2014    Diabetes type 2 since 2000 07/29/2014    Diabetic neuropathy 07/29/2014    Encounter for blood transfusion     History of cardioversion 10/03/2019    History of hepatitis C, s/p successful Rx w/ SVR24 - 9/2017 07/29/2014    Genotype 1a, treatment naive 10/2014 liver biopsy - grade 1 / stage 1 Completed Harvoni w/ SVR    Hyperlipidemia 07/29/2014    Hypertension     Neuropathy     Organ transplant candidate 07/29/2014    Pancreatitis     S/P cadaveric kidney transplant 11/5/2016. ESRD secondary to HTN/DMII 11/05/2016    Type 2 diabetes mellitus with stage 3a chronic kidney disease, with long-term current use of insulin 07/29/2014       Past Surgical History:   Procedure Laterality Date    ABLATION OF ARRHYTHMOGENIC FOCUS FOR ATRIAL FIBRILLATION N/A 6/11/2024    Procedure: Ablation atrial fibrillation;  Surgeon: Bronson Bowden MD;  Location: [facility] EP LAB;  Service: Cardiology;  Laterality: N/A;  AF, FELICIANO (cx if SR), PVI, RFA, Carto, Gen, GP, 3 Prep    ABLATION, ATRIAL FLUTTER, TYPICAL  6/11/2024    Procedure: Ablation, Atrial Flutter, Typical;  Surgeon: Bronson Bowden MD;  Location: [facility] EP LAB;  Service: Cardiology;;    APPENDECTOMY      BONE MARROW BIOPSY Left 6/26/2024    Procedure: Biopsy-bone marrow;  Surgeon: Bridger Zapata MD;  Location: [facility] ENDO ([address] [place] [address]);  Service: Oncology;  Laterality: Left;  6/24-pt knows to hold aspirin and eliquis as instructed by hem/onc, precall complete-Kpvt    CARDIOVERSION  6/11/2024    Procedure: Cardioversion;  Surgeon: Bronson Bowden MD;  Location: [facility] EP LAB;  Service: Cardiology;;    CATARACT EXTRACTION W/  INTRAOCULAR LENS IMPLANT Right 3/13/2024    Procedure: EXTRACTION, CATARACT, WITH IOL INSERTION;  Surgeon: Jennifer Palacio MD;  Location: [facility] OR;  Service:  Ophthalmology;  Laterality: Right;    CATARACT EXTRACTION W/  INTRAOCULAR LENS IMPLANT Left 4/10/2024    Procedure: EXTRACTION, CATARACT, WITH IOL INSERTION;  Surgeon: Jennifer Palacio MD;  Location: [facility] OR;  Service: Ophthalmology;  Laterality: Left;    COLONOSCOPY N/A 12/21/2020    Procedure: COLONOSCOPY;  Surgeon: Tico Bell MD;  [facility]O [facility]  Service: Endoscopy;  Laterality: N/A;  ok to hold eliquis per Dr. Valadez, see telephone encounter 11/13/2020-MS  covid test 12/18 Chase    ECHOCARDIOGRAM,TRANSESOPHAGEAL N/A 2/3/2025    Procedure: Transesophageal echo (FELICIANO) intra-procedure log documentation;  Surgeon: Grayson Lin III, MD;  Location: [facility] EP LAB;  Service: Cardiology;  Laterality: N/A;    KIDNEY TRANSPLANT      TRANSESOPHAGEAL ECHOCARDIOGRAPHY N/A 8/26/2019    Procedure: ECHOCARDIOGRAM, TRANSESOPHAGEAL;  Surgeon: Dos Diagnostic Provider;  Location: [facility] EP LAB;  Service: Cardiology;  Laterality: N/A;    TRANSESOPHAGEAL ECHOCARDIOGRAPHY N/A 6/11/2024    Procedure: ECHOCARDIOGRAM, TRANSESOPHAGEAL;  Surgeon: Grayson Lin III, MD;  Location: [facility] EP LAB;  Service: Cardiology;  Laterality: N/A;    TREATMENT OF CARDIAC ARRHYTHMIA N/A 8/26/2019    Procedure: CARDIOVERSION;  Surgeon: Bronson Bowden MD;  Location: [facility] EP LAB;  Service: Cardiology;  Laterality: N/A;  AF, FELICIANO, DCCV, MAC, GP, 3 PREP    TREATMENT OF CARDIAC ARRHYTHMIA N/A 2/3/2025    Procedure: Cardioversion or Defibrillation;  Surgeon: Bronson Bowden MD;  Location: [facility] EP LAB;  Service: Cardiology;  Laterality: N/A;  AF, FELICIANO, DCCV, MAC, GP, 3 Prep       Review of patient's allergies indicates:   Allergen Reactions    Nifedipine Other (See Comments)     Gingival hyperplasia       Current Facility-Administered Medications on File Prior to Encounter   Medication    balanced salt irrigation intra-ocular solution 1 drop    epoetin tanya injection 4,000 Units    phenylephrine HCL 10% ophthalmic solution 1 drop     proparacaine 0.5 % ophthalmic solution 1 drop    sodium chloride 0.9% flush 10 mL    TETRAcaine HCl (PF) 0.5 % Drop 1 drop     Current Outpatient Medications on File Prior to Encounter   Medication Sig    atorvastatin (LIPITOR) 80 MG tablet Take 1 tablet (80 mg total) by mouth once daily.    blood sugar diagnostic Strp Use to check blood glucose 1 times daily, to use with insurance preferred meter    blood-glucose meter Misc Use to check blood glucose 1 times daily, to use with insurance preferred meter    blood-glucose sensor Kayla Gin 3, use as directed, change every 14 days , e 11.65    doxazosin (CARDURA) 4 MG tablet Take 1 tablet (4 mg total) by mouth once daily.    empagliflozin (JARDIANCE) 10 mg tablet Take 1 tablet (10 mg total) by mouth once daily.    ergocalciferol (ERGOCALCIFEROL) 50,000 unit Cap Take 1 capsule (50,000 Units total) by mouth every 7 days.    furosemide (LASIX) 40 MG tablet Take 2 tablets (80 mg total) by mouth daily as needed (for swelling).    gabapentin (NEURONTIN) 300 MG capsule Take 1 capsule by mouth in the morning, 1 capsule midday, and 3 capsules at night.    glucagon (GVOKE HYPOPEN 2-PACK) 1 mg/0.2 mL AtIn Inject 0.2 mLs into the skin as needed (severe hypoglycemia).    hydrALAZINE (APRESOLINE) 100 MG tablet Take 1 tablet (100 mg total) by mouth every 8 (eight) hours.    hydroCHLOROthiazide 12.5 MG Tab Take 1 tablet (12.5 mg total) by mouth once daily.    insulin aspart U-100 (NOVOLOG U-100 INSULIN ASPART) 100 unit/mL injection Use in pump, max daily 100 units.    insulin aspart U-100 (NOVOLOG) 100 unit/mL (3 mL) InPn pen Inject 10 units under the skin w/ breakfast, 7 units at lunch and dinner. Scale 180-230+2, 231-280+4, 281-330+6, 331-380+8, >380+10.  Max daily 57 units.    insulin glargine U-100, Lantus, (LANTUS SOLOSTAR U-100 INSULIN) 100 unit/mL (3 mL) InPn pen Inject 20 Units into the skin every morning.    insulin  cart,aut,G6/7,cntr (OMNIPOD 5 G6-G7 INTRO KT,GEN5,) Crtg  "Use as directed.    insulin pump cart,auto,BT,G6/7 (OMNIPOD 5 G6-G7 PODS, GEN 5,) Crtg Change every 48 hours.    lancets 30 gauge Misc Use to check blood glucose 1 times daily, to use with insurance preferred meter    magnesium oxide (MAG-OX) 400 mg (241.3 mg magnesium) tablet Take 1 tablet (400 mg total) by mouth 2 (two) times daily.    meclizine (ANTIVERT) 25 mg tablet Take 1 tablet (25 mg total) by mouth 3 (three) times daily as needed for Dizziness.    mycophenolate (CELLCEPT) 250 mg Cap Take 2 capsules (500 mg total) by mouth 2 (two) times daily.    pen needle, diabetic (BD ULTRA-FINE LO PEN NEEDLE) 32 gauge x 5/32" Ndle USE TO ADMINISTER INSULIN 4 TIMES DAILY.    predniSONE (DELTASONE) 5 MG tablet Take 1 tablet (5 mg total) by mouth once daily.    rivaroxaban (XARELTO) 15 mg Tab Take 1 tablet (15 mg total) by mouth daily with dinner or evening meal.    tacrolimus (PROGRAF) 0.5 MG Cap Take 1 capsule (0.5 mg total) by mouth once daily. Start tomorrow    valsartan (DIOVAN) 320 MG tablet Take 1 tablet (320 mg total) by mouth once daily. HOLD until follow up with kidney doctor    [DISCONTINUED] insulin lispro (HUMALOG KWIKPEN INSULIN) 100 unit/mL pen Inject 18 units w/ breakfast, 16 units w/ lunch and dinner plus scale 150-200 +2, 201-250 +4, 251-300 +6, 301-350 +8.     Family History       Problem Relation (Age of Onset)    Cancer Brother    Diabetes Mother    Heart disease Mother, Father    Hypertension Mother, Brother          Tobacco Use    Smoking status: Former     Current packs/day: 0.00     Average packs/day: 0.5 packs/day for 32.0 years (16.0 ttl pk-yrs)     Types: Cigarettes     Start date: 1984     Quit date: 2016     Years since quittin.3    Smokeless tobacco: Never   Substance and Sexual Activity    Alcohol use: Yes     Comment: Pt reports occasional beers on Sundays. Pt reports drinking daily prior to ESRD diagnosis.    Drug use: No    Sexual activity: Yes     Partners: Female "     Review of Systems   Constitutional:  Positive for chills, fatigue and fever.   Respiratory:  Positive for cough. Negative for chest tightness and shortness of breath.    Cardiovascular:  Negative for chest pain.   Gastrointestinal:  Negative for abdominal pain and constipation.   Genitourinary:  Negative for dysuria and flank pain.     Objective:     Vital Signs (Most Recent):  Temp: 99.9 °F (37.7 °C) (03/16/25 0225)  Pulse: 89 (03/16/25 0316)  Resp: 20 (03/16/25 0316)  BP: (!) 153/70 (03/16/25 0200)  SpO2: 96 % (03/16/25 0316) Vital Signs (24h Range):  Temp:  [98.8 °F (37.1 °C)-99.9 °F (37.7 °C)] 99.9 °F (37.7 °C)  Pulse:  [] 89  Resp:  [16-20] 20  SpO2:  [96 %] 96 %  BP: (140-153)/(63-70) 153/70     Weight: 91.6 kg (202 lb)  Body mass index is 27.4 kg/m².     Physical Exam  Constitutional:       General: He is not in acute distress.  HENT:      Head: Normocephalic and atraumatic.   Eyes:      Extraocular Movements: Extraocular movements intact.   Cardiovascular:      Rate and Rhythm: Normal rate and regular rhythm.   Pulmonary:      Effort: Pulmonary effort is normal. No respiratory distress.      Breath sounds: Rales present. No wheezing.      Comments: Faint RUL rales  Abdominal:      General: There is distension.      Palpations: Abdomen is soft.      Tenderness: There is no abdominal tenderness. There is no guarding or rebound.   Musculoskeletal:      Right lower leg: No edema.      Left lower leg: No edema.   Skin:     General: Skin is warm and dry.   Neurological:      Mental Status: He is alert. Mental status is at baseline.   Psychiatric:         Mood and Affect: Mood normal.         Behavior: Behavior normal.                Significant Labs: All pertinent labs within the past 24 hours have been reviewed.    Significant Imaging: I have reviewed all pertinent imaging results/findings within the past 24 hours.  Assessment/Plan:     * Hospital-acquired pneumonia  Patient has a diagnosis of  pneumonia. The cause of the pneumonia is suspected to be bacterial in etiology but organism is not known. The pneumonia is stable. The patient has the following signs/symptoms of pneumonia: cough and sputum production. The patient does not have a current oxygen requirement and the patient does not have a home oxygen requirement. I have reviewed the pertinent imaging. The following cultures have been collected: Blood cultures and Sputum culture The culture results are listed below.     Current antimicrobial regimen consists of the antibiotics listed below. Will monitor patient closely and Adjust treatment plan as follows      Antibiotics (From admission, onward)      Start     Stop Route Frequency Ordered    03/16/25 0230  vancomycin (VANCOCIN) 2,250 mg in 0.9% NaCl 500 mL IVPB  (ED Adult Sepsis Treatment)         -- IV Once 03/16/25 0158    03/16/25 0200  piperacillin-tazobactam (ZOSYN) 4.5 g in D5W 100 mL IVPB (MB+)  (ED Adult Sepsis Treatment)         03/17/25 0159 IV Every 8 hours (non-standard times) 03/16/25 0158            Microbiology Results (last 7 days)       Procedure Component Value Units Date/Time    MRSA Screen by PCR [[id number]]     Order Status: No result Specimen: Nasal Swab     Blood culture x two cultures. Draw prior to antibiotics. [[id number]] Collected: 03/16/25 0217    Order Status: Sent Specimen: Blood from Peripheral, Hand, Right Updated: 03/16/25 0225    Influenza A & B by Molecular [[id number]] Collected: 03/16/25 0142    Order Status: Completed Specimen: Nasopharyngeal Swab Updated: 03/16/25 0222     Influenza A, Molecular Negative     Influenza B, Molecular Negative     Flu A & B Source Nasal swab    Blood culture x two cultures. Draw prior to antibiotics. [[id number]] Collected: 03/16/25 0139    Order Status: Sent Specimen: Blood from Peripheral, Upper Arm, Left Updated: 03/16/25 0200          -flu negative  -RUL consolidation on CXR  -follow up MRSA PCR  -follow up sputum  cx  -continue Vanc/Zosyn  -discontinue Vanc if PCR neg      Wide-complex tachycardia  18 beats of monomorphic, wide complex tachycardia noted on 3/16/25 around 3AM    -replete K, Mg, Phos  -follow up repeat BMP @ 10AM  -continue telemetry        Immunosuppressed status  History of kidney transplant on immunosuppression    -KTM consulted  -hold MMF and prednisone  -continue home tacro dose  -daily tacro level      Elevated troponin  Elevated troponin on admission with no chest pressure/pain, sob, abdominal pain, nausea. It could be related to his underlying infection. Continue to monitor for symptoms. No indication for ACS at this time.       -trend troponin  -stat EKG for chest pain       Persistent atrial fibrillation  Patient has persistent (7 days or more) atrial fibrillation. Patient is currently in sinus rhythm. [id number] Score: 3. The patients heart rate in the last 24 hours is as follows:  Pulse  Min: 70  Max: 211     Antiarrhythmics       Anticoagulants       Plan  - Replete lytes with a goal of K>4, Mg >2  - Patient is anticoagulated, see medications listed above.  - Patient's afib is currently controlled  - cont home Xarelto   - BB held due to iatrogenic bradycardia  - Continuous telemetry        Hyperlipidemia  Continue home lipitor 80      Essential hypertension  Patient's blood pressure range in the last 24 hours was: BP  Min: 140/63  Max: 153/70.The patient's inpatient anti-hypertensive regimen is listed below:  Current Antihypertensives       Plan  - BP is uncontrolled, will adjust as follows:    - cont home Hctz 12.5, hydralazine 100 q8, doxazosin 4      Diabetic polyneuropathy associated with type 2 diabetes mellitus  Continue home Gabapentin regimen as follows:   - morning and afternoon: 100 mg   - evenin mg qhs      Type 2 diabetes mellitus with stage 3a chronic kidney disease, with long-term current use of insulin  Creatine stable for now. BMP reviewed- noted Estimated Creatinine  Clearance: 31.5 mL/min (A) (based on SCr of 2.5 mg/dL (H)). according to latest data. Based on current GFR, CKD stage is stage 3 - GFR 30-59.  Monitor UOP and serial BMP and adjust therapy as needed. Renally dose meds. Avoid nephrotoxic medications and procedures.    -cont home Jardiance  -glargine 15 units in AM  -low dose sliding scale insulin  -diabetic diet  -BG goal 140-180      VTE Risk Mitigation (From admission, onward)           Ordered     rivaroxaban tablet 15 mg  With dinner         03/16/25 0447     IP VTE HIGH RISK PATIENT  Once         03/16/25 0447     Place sequential compression device  Until discontinued         03/16/25 0447                               Pharmacokinetic Initial Assessment: IV Vancomycin    Assessment/Plan:    Initiate intravenous vancomycin with loading dose of 2250 mg once followed by a maintenance dose of vancomycin 1250 mg IV every 24 hours  Desired empiric serum trough concentration is 15 to 20 mcg/mL  Draw vancomycin trough level 60 min prior to fourth dose on 3/19 at approximately 0200  Pharmacy will continue to follow and monitor vancomycin.      Please contact pharmacy at extension [phone] with any questions regarding this assessment.     Thank you for the consult,   Lacey Dias, PharmD       Patient brief summary:  Ravi Zamorano is a 69 y.o. male initiated on antimicrobial therapy with IV Vancomycin for treatment of suspected lower respiratory infection    Drug Allergies:   Review of patient's allergies indicates:   Allergen Reactions    Nifedipine Other (See Comments)     Gingival hyperplasia       Actual Body Weight:   91.6 kg    Renal Function:   Estimated Creatinine Clearance: 31.5 mL/min (A) (based on SCr of 2.5 mg/dL (H)).    Dialysis Method (if applicable):  N/A    CBC (last 72 hours):  Recent Labs   Lab Result Units 03/16/25  0140   WBC K/uL 13.98*   Hemoglobin g/dL 9.9*   Hematocrit % 32.7*   Platelets K/uL 153   Gran % % 85.4*   Lymph % % 1.8*   Mono % %  "11.1   Eosinophil % % 0.1   Basophil % % 0.1   Differential Method  Automated       Metabolic Panel (last 72 hours):  Recent Labs   Lab Result Units 03/16/25  0140 03/16/25  0357   Sodium mmol/L 131*  --    Potassium mmol/L 2.8*  --    Chloride mmol/L 99  --    CO2 mmol/L 20*  --    Glucose mg/dL 197*  --    Glucose, UA   --  4+*   BUN mg/dL 39*  --    Creatinine mg/dL 2.5*  --    Albumin g/dL 3.1*  --    Total Bilirubin mg/dL 0.3  --    Alkaline Phosphatase U/L 43  --    AST U/L 9*  --    ALT U/L <5*  --    Magnesium mg/dL 1.8  --    Phosphorus mg/dL 2.0*  --        Drug levels (last 3 results):  No results for input(s): "VANCOMYCINRA", "VANCORANDOM", "VANCOMYCINPE", "VANCOPEAK", "VANCOMYCINTR", "VANCOTROUGH" in the last 72 hours.    Microbiologic Results:  Microbiology Results (last 7 days)       Procedure Component Value Units Date/Time    Culture, Respiratory with Gram Stain [[id number]]     Order Status: No result Specimen: Respiratory     MRSA Screen by PCR [[id number]]     Order Status: No result Specimen: Nasal Swab     Blood culture x two cultures. Draw prior to antibiotics. [[id number]] Collected: 03/16/25 0217    Order Status: Sent Specimen: Blood from Peripheral, Hand, Right Updated: 03/16/25 0225    Influenza A & B by Molecular [[id number]] Collected: 03/16/25 0142    Order Status: Completed Specimen: Nasopharyngeal Swab Updated: 03/16/25 0222     Influenza A, Molecular Negative     Influenza B, Molecular Negative     Flu A & B Source Nasal swab    Blood culture x two cultures. Draw prior to antibiotics. [[id number]] Collected: 03/16/25 0139    Order Status: Sent Specimen: Blood from Peripheral, Upper Arm, Left Updated: 03/16/25 0200              Jocelyn Obrien DO  Department of Hospital Medicine  [facility] - Emergency Dept          "

## 2025-03-17 ENCOUNTER — DOCUMENTATION ONLY (OUTPATIENT)
Dept: CARDIOLOGY | Facility: HOSPITAL | Age: 70
End: 2025-03-17
Payer: MEDICARE

## 2025-03-17 LAB
ALBUMIN SERPL BCP-MCNC: 2.5 G/DL (ref 3.5–5.2)
ALP SERPL-CCNC: 43 U/L (ref 40–150)
ALT SERPL W/O P-5'-P-CCNC: <5 U/L (ref 10–44)
ANION GAP SERPL CALC-SCNC: 11 MMOL/L (ref 8–16)
ANION GAP SERPL CALC-SCNC: 11 MMOL/L (ref 8–16)
AST SERPL-CCNC: 9 U/L (ref 10–40)
BASOPHILS # BLD AUTO: 0.03 K/UL (ref 0–0.2)
BASOPHILS NFR BLD: 0.3 % (ref 0–1.9)
BILIRUB SERPL-MCNC: 0.7 MG/DL (ref 0.1–1)
BUN SERPL-MCNC: 38 MG/DL (ref 8–23)
BUN SERPL-MCNC: 39 MG/DL (ref 8–23)
CALCIUM SERPL-MCNC: 7.8 MG/DL (ref 8.7–10.5)
CALCIUM SERPL-MCNC: 7.9 MG/DL (ref 8.7–10.5)
CHLORIDE SERPL-SCNC: 98 MMOL/L (ref 95–110)
CHLORIDE SERPL-SCNC: 98 MMOL/L (ref 95–110)
CO2 SERPL-SCNC: 22 MMOL/L (ref 23–29)
CO2 SERPL-SCNC: 24 MMOL/L (ref 23–29)
CREAT SERPL-MCNC: 2.4 MG/DL (ref 0.5–1.4)
CREAT SERPL-MCNC: 2.5 MG/DL (ref 0.5–1.4)
DIFFERENTIAL METHOD BLD: ABNORMAL
EOSINOPHIL # BLD AUTO: 0 K/UL (ref 0–0.5)
EOSINOPHIL NFR BLD: 0.3 % (ref 0–8)
ERYTHROCYTE [DISTWIDTH] IN BLOOD BY AUTOMATED COUNT: 16.2 % (ref 11.5–14.5)
EST. GFR  (NO RACE VARIABLE): 27.5 ML/MIN/1.73 M^2
EST. GFR  (NO RACE VARIABLE): 28.9 ML/MIN/1.73 M^2
GLUCOSE SERPL-MCNC: 126 MG/DL (ref 70–110)
GLUCOSE SERPL-MCNC: 133 MG/DL (ref 70–110)
HCT VFR BLD AUTO: 29.1 % (ref 40–54)
HGB BLD-MCNC: 8.8 G/DL (ref 14–18)
IMM GRANULOCYTES # BLD AUTO: 0.11 K/UL (ref 0–0.04)
IMM GRANULOCYTES NFR BLD AUTO: 0.9 % (ref 0–0.5)
LYMPHOCYTES # BLD AUTO: 0.4 K/UL (ref 1–4.8)
LYMPHOCYTES NFR BLD: 3.4 % (ref 18–48)
MAGNESIUM SERPL-MCNC: 2.1 MG/DL (ref 1.6–2.6)
MCH RBC QN AUTO: 22.4 PG (ref 27–31)
MCHC RBC AUTO-ENTMCNC: 30.2 G/DL (ref 32–36)
MCV RBC AUTO: 74 FL (ref 82–98)
MONOCYTES # BLD AUTO: 1.2 K/UL (ref 0.3–1)
MONOCYTES NFR BLD: 10.3 % (ref 4–15)
NEUTROPHILS # BLD AUTO: 10 K/UL (ref 1.8–7.7)
NEUTROPHILS NFR BLD: 84.8 % (ref 38–73)
NRBC BLD-RTO: 0 /100 WBC
OSMOLALITY SERPL: 293 MOSM/KG (ref 280–300)
OSMOLALITY UR: 352 MOSM/KG (ref 50–1200)
PHOSPHATE SERPL-MCNC: 2.6 MG/DL (ref 2.7–4.5)
PLATELET # BLD AUTO: 136 K/UL (ref 150–450)
PMV BLD AUTO: ABNORMAL FL (ref 9.2–12.9)
POCT GLUCOSE: 120 MG/DL (ref 70–110)
POCT GLUCOSE: 140 MG/DL (ref 70–110)
POCT GLUCOSE: 164 MG/DL (ref 70–110)
POCT GLUCOSE: 92 MG/DL (ref 70–110)
POTASSIUM SERPL-SCNC: 3.2 MMOL/L (ref 3.5–5.1)
POTASSIUM SERPL-SCNC: 3.5 MMOL/L (ref 3.5–5.1)
PROT SERPL-MCNC: 5.5 G/DL (ref 6–8.4)
RBC # BLD AUTO: 3.92 M/UL (ref 4.6–6.2)
SODIUM SERPL-SCNC: 131 MMOL/L (ref 136–145)
SODIUM SERPL-SCNC: 133 MMOL/L (ref 136–145)
TACROLIMUS BLD-MCNC: <2 NG/ML (ref 5–15)
TACROLIMUS BLD-MCNC: <2 NG/ML (ref 5–15)
TSH SERPL DL<=0.005 MIU/L-ACNC: 1 UIU/ML (ref 0.4–4)
WBC # BLD AUTO: 11.82 K/UL (ref 3.9–12.7)

## 2025-03-17 PROCEDURE — 63600175 PHARM REV CODE 636 W HCPCS: Mod: HCNC

## 2025-03-17 PROCEDURE — 63600175 PHARM REV CODE 636 W HCPCS: Mod: HCNC | Performed by: HOSPITALIST

## 2025-03-17 PROCEDURE — 63600175 PHARM REV CODE 636 W HCPCS: Mod: HCNC | Performed by: INTERNAL MEDICINE

## 2025-03-17 PROCEDURE — 36415 COLL VENOUS BLD VENIPUNCTURE: CPT | Mod: HCNC

## 2025-03-17 PROCEDURE — 84100 ASSAY OF PHOSPHORUS: CPT | Mod: HCNC

## 2025-03-17 PROCEDURE — 85025 COMPLETE CBC W/AUTO DIFF WBC: CPT | Mod: HCNC

## 2025-03-17 PROCEDURE — 21400001 HC TELEMETRY ROOM: Mod: HCNC

## 2025-03-17 PROCEDURE — 25000003 PHARM REV CODE 250: Mod: HCNC

## 2025-03-17 PROCEDURE — 84443 ASSAY THYROID STIM HORMONE: CPT | Mod: HCNC | Performed by: INTERNAL MEDICINE

## 2025-03-17 PROCEDURE — 80197 ASSAY OF TACROLIMUS: CPT | Mod: HCNC

## 2025-03-17 PROCEDURE — 25000003 PHARM REV CODE 250: Mod: HCNC | Performed by: HOSPITALIST

## 2025-03-17 PROCEDURE — 83735 ASSAY OF MAGNESIUM: CPT | Mod: HCNC

## 2025-03-17 PROCEDURE — 99233 SBSQ HOSP IP/OBS HIGH 50: CPT | Mod: HCNC,GC,, | Performed by: STUDENT IN AN ORGANIZED HEALTH CARE EDUCATION/TRAINING PROGRAM

## 2025-03-17 PROCEDURE — 63600175 PHARM REV CODE 636 W HCPCS: Mod: HCNC | Performed by: STUDENT IN AN ORGANIZED HEALTH CARE EDUCATION/TRAINING PROGRAM

## 2025-03-17 PROCEDURE — 36415 COLL VENOUS BLD VENIPUNCTURE: CPT | Mod: HCNC | Performed by: INTERNAL MEDICINE

## 2025-03-17 PROCEDURE — 83935 ASSAY OF URINE OSMOLALITY: CPT | Mod: HCNC | Performed by: INTERNAL MEDICINE

## 2025-03-17 PROCEDURE — 25000003 PHARM REV CODE 250: Mod: HCNC | Performed by: INTERNAL MEDICINE

## 2025-03-17 PROCEDURE — 80048 BASIC METABOLIC PNL TOTAL CA: CPT | Mod: HCNC | Performed by: INTERNAL MEDICINE

## 2025-03-17 PROCEDURE — 83930 ASSAY OF BLOOD OSMOLALITY: CPT | Mod: HCNC | Performed by: INTERNAL MEDICINE

## 2025-03-17 PROCEDURE — 80053 COMPREHEN METABOLIC PANEL: CPT | Mod: HCNC

## 2025-03-17 RX ORDER — VALSARTAN 160 MG/1
320 TABLET ORAL DAILY
Status: DISCONTINUED | OUTPATIENT
Start: 2025-03-17 | End: 2025-03-19 | Stop reason: HOSPADM

## 2025-03-17 RX ORDER — LEVOFLOXACIN 750 MG/1
750 TABLET ORAL EVERY OTHER DAY
Qty: 2 TABLET | Refills: 0 | Status: SHIPPED | OUTPATIENT
Start: 2025-03-19 | End: 2025-03-19

## 2025-03-17 RX ORDER — TACROLIMUS 0.5 MG/1
0.5 CAPSULE ORAL EVERY 12 HOURS
Start: 2025-03-17 | End: 2025-03-19 | Stop reason: HOSPADM

## 2025-03-17 RX ORDER — PREDNISONE 5 MG/1
5 TABLET ORAL DAILY
Status: DISCONTINUED | OUTPATIENT
Start: 2025-03-17 | End: 2025-03-19 | Stop reason: HOSPADM

## 2025-03-17 RX ORDER — LEVOFLOXACIN 750 MG/1
750 TABLET ORAL EVERY OTHER DAY
Status: DISCONTINUED | OUTPATIENT
Start: 2025-03-17 | End: 2025-03-19 | Stop reason: HOSPADM

## 2025-03-17 RX ORDER — TACROLIMUS 0.5 MG/1
0.5 CAPSULE ORAL 2 TIMES DAILY
Status: DISCONTINUED | OUTPATIENT
Start: 2025-03-17 | End: 2025-03-18

## 2025-03-17 RX ORDER — INSULIN ASPART 100 [IU]/ML
5 INJECTION, SOLUTION INTRAVENOUS; SUBCUTANEOUS
Status: DISCONTINUED | OUTPATIENT
Start: 2025-03-17 | End: 2025-03-19 | Stop reason: HOSPADM

## 2025-03-17 RX ADMIN — RIVAROXABAN 15 MG: 10 TABLET, FILM COATED ORAL at 05:03

## 2025-03-17 RX ADMIN — VANCOMYCIN HYDROCHLORIDE 1250 MG: 1.25 INJECTION, POWDER, LYOPHILIZED, FOR SOLUTION INTRAVENOUS at 04:03

## 2025-03-17 RX ADMIN — HYDRALAZINE HYDROCHLORIDE 100 MG: 25 TABLET ORAL at 09:03

## 2025-03-17 RX ADMIN — ACETAMINOPHEN 650 MG: 325 TABLET ORAL at 04:03

## 2025-03-17 RX ADMIN — ACETAMINOPHEN 650 MG: 325 TABLET ORAL at 08:03

## 2025-03-17 RX ADMIN — PREDNISONE 5 MG: 5 TABLET ORAL at 01:03

## 2025-03-17 RX ADMIN — PIPERACILLIN SODIUM AND TAZOBACTAM SODIUM 4.5 G: 4; .5 INJECTION, POWDER, FOR SOLUTION INTRAVENOUS at 09:03

## 2025-03-17 RX ADMIN — INSULIN ASPART 10 UNITS: 100 INJECTION, SOLUTION INTRAVENOUS; SUBCUTANEOUS at 08:03

## 2025-03-17 RX ADMIN — POTASSIUM BICARBONATE 40 MEQ: 391 TABLET, EFFERVESCENT ORAL at 10:03

## 2025-03-17 RX ADMIN — LEVOFLOXACIN 750 MG: 750 TABLET, FILM COATED ORAL at 01:03

## 2025-03-17 RX ADMIN — TACROLIMUS 0.5 MG: 0.5 CAPSULE ORAL at 05:03

## 2025-03-17 RX ADMIN — TACROLIMUS 0.5 MG: 0.5 CAPSULE ORAL at 08:03

## 2025-03-17 RX ADMIN — INSULIN ASPART 2 UNITS: 100 INJECTION, SOLUTION INTRAVENOUS; SUBCUTANEOUS at 01:03

## 2025-03-17 RX ADMIN — DOXAZOSIN 4 MG: 2 TABLET ORAL at 09:03

## 2025-03-17 RX ADMIN — DIBASIC SODIUM PHOSPHATE, MONOBASIC POTASSIUM PHOSPHATE AND MONOBASIC SODIUM PHOSPHATE 1 TABLET: 852; 155; 130 TABLET ORAL at 08:03

## 2025-03-17 RX ADMIN — VALSARTAN 320 MG: 160 TABLET, FILM COATED ORAL at 01:03

## 2025-03-17 RX ADMIN — GABAPENTIN 100 MG: 100 CAPSULE ORAL at 08:03

## 2025-03-17 RX ADMIN — EMPAGLIFLOZIN 10 MG: 10 TABLET, FILM COATED ORAL at 08:03

## 2025-03-17 RX ADMIN — HYDRALAZINE HYDROCHLORIDE 100 MG: 25 TABLET ORAL at 01:03

## 2025-03-17 RX ADMIN — ACETAMINOPHEN 650 MG: 325 TABLET ORAL at 10:03

## 2025-03-17 RX ADMIN — INSULIN ASPART 5 UNITS: 100 INJECTION, SOLUTION INTRAVENOUS; SUBCUTANEOUS at 05:03

## 2025-03-17 RX ADMIN — ATORVASTATIN CALCIUM 80 MG: 40 TABLET, FILM COATED ORAL at 08:03

## 2025-03-17 RX ADMIN — GABAPENTIN 100 MG: 100 CAPSULE ORAL at 05:03

## 2025-03-17 RX ADMIN — HYDRALAZINE HYDROCHLORIDE 100 MG: 25 TABLET ORAL at 06:03

## 2025-03-17 RX ADMIN — DIBASIC SODIUM PHOSPHATE, MONOBASIC POTASSIUM PHOSPHATE AND MONOBASIC SODIUM PHOSPHATE 1 TABLET: 852; 155; 130 TABLET ORAL at 10:03

## 2025-03-17 RX ADMIN — INSULIN ASPART 5 UNITS: 100 INJECTION, SOLUTION INTRAVENOUS; SUBCUTANEOUS at 01:03

## 2025-03-17 RX ADMIN — INSULIN GLARGINE 15 UNITS: 100 INJECTION, SOLUTION SUBCUTANEOUS at 08:03

## 2025-03-17 RX ADMIN — PIPERACILLIN SODIUM AND TAZOBACTAM SODIUM 4.5 G: 4; .5 INJECTION, POWDER, FOR SOLUTION INTRAVENOUS at 12:03

## 2025-03-17 RX ADMIN — POTASSIUM BICARBONATE 40 MEQ: 391 TABLET, EFFERVESCENT ORAL at 08:03

## 2025-03-17 NOTE — ASSESSMENT & PLAN NOTE
Most recent BP: (!) 158/82 (03/17/25 1137); 24 hr range BP  Min: 150/70  Max: 188/93  Current meds: doxazosin tablet 4 mg, Daily, Oral  hydrALAZINE tablet 100 mg, Every 8 hours, Oral  valsartan tablet 320 mg, Daily, Oral  Adjust regimen PRN goal SBP <180  Resume ARB  Hold diuretic in s/o PHUONG

## 2025-03-17 NOTE — SUBJECTIVE & OBJECTIVE
Interval History: NAEON; clinically improved; vanc d/c'd d/t -ve MRSA PCR. Transitioned pip-tazo to levofloxacin (EED 03/22). KTM following; adjusting tacro dose; resumed pred, holding MMF. HTN, resumed ARB. Plan for d/c tmr    Review of Systems  Objective:     Vital Signs (Most Recent):  Temp: 98.3 °F (36.8 °C) (03/17/25 1137)  Pulse: 100 (03/17/25 1137)  Resp: 17 (03/17/25 1137)  BP: (!) 158/82 (03/17/25 1137)  SpO2: 97 % (03/17/25 1137) Vital Signs (24h Range):  Temp:  [98 °F (36.7 °C)-99.6 °F (37.6 °C)] 98.3 °F (36.8 °C)  Pulse:  [] 100  Resp:  [17-20] 17  SpO2:  [95 %-99 %] 97 %  BP: (150-188)/(70-93) 158/82     Weight: 91.6 kg (201 lb 15.1 oz)  Body mass index is 27.39 kg/m².    Intake/Output Summary (Last 24 hours) at 3/17/2025 1312  Last data filed at 3/17/2025 0745  Gross per 24 hour   Intake 596.46 ml   Output 700 ml   Net -103.54 ml         Physical Exam  Constitutional:       General: He is not in acute distress.  HENT:      Head: Normocephalic and atraumatic.   Eyes:      Extraocular Movements: Extraocular movements intact.   Cardiovascular:      Rate and Rhythm: Normal rate and regular rhythm.   Pulmonary:      Effort: Pulmonary effort is normal. No respiratory distress.      Breath sounds: Rales present. No wheezing.      Comments: Faint RUL rales  Abdominal:      General: There is distension.      Palpations: Abdomen is soft.      Tenderness: There is no abdominal tenderness. There is no guarding or rebound.   Musculoskeletal:      Right lower leg: No edema.      Left lower leg: No edema.   Skin:     General: Skin is warm and dry.   Neurological:      Mental Status: He is alert. Mental status is at baseline.   Psychiatric:         Mood and Affect: Mood normal.         Behavior: Behavior normal.               Significant Labs: All pertinent labs within the past 24 hours have been reviewed.    Significant Imaging: I have reviewed all pertinent imaging results/findings within the past 24 hours.

## 2025-03-17 NOTE — ASSESSMENT & PLAN NOTE
History of kidney transplant on immunosuppression  KTM consulted  Hold MMF in s/o infxn  Titrate tacro dose per KTM  Resume prednisone  Daily tacro level

## 2025-03-17 NOTE — PLAN OF CARE
Initial Discharge Planning Assessment:  Patient admitted on: 3/16/25     Chart reviewed, Care plan discussed with treatment team,  attending Dr. Kern     PCP updated in Epic: Correct in Epic   Pharmacy, updated in Epic: Bedside      DME at home: Cane       Current dispo: Home with family & home Health       Transportation: Family can provide      Power of  or Living Will: wife      Anticipated DC needs from CM perspective:  TBD        [facility] - Emergency Dept  Initial Discharge Assessment       Primary Care Provider: Mima Mack MD    Admission Diagnosis: Screening for cardiovascular condition [Z13.6]    Admission Date: 3/16/2025  Expected Discharge Date: 3/18/2025    Transition of Care Barriers: (P) None    Payor: HU[employer] MANAGED MEDICARE / Plan: HU[employer] MEDICARE HMO / Product Type: Capitation /     Extended Emergency Contact Information  Primary Emergency Contact: Erika Zamorano  Address: [address] [place] [id number] [facility]  Home Phone: [phone]  Mobile Phone: [phone]  Relation: Spouse    Discharge Plan A: (P) Home with family, Home Health  Discharge Plan B: (P) Home with family, Home Health      [facility]  [address] [facility] [address]  Phone: [phone] Fax: [phone]    [id number] [address] AT [address] [address]  Phone: [phone] Fax: [phone]      Initial Assessment (most recent)       Adult Discharge Assessment - 03/16/25 1911          Discharge Assessment    Assessment Type Discharge Planning Assessment (P)      Confirmed/corrected address, phone number and insurance Yes (P)      Confirmed Demographics Correct on Facesheet (P)      Source of Information patient;health record;family (P)      People in Home spouse (P)      Prior to hospitilization cognitive status: Alert/Oriented (P)       Current cognitive status: Alert/Oriented (P)      Walking or Climbing Stairs Difficulty yes (P)      Walking or Climbing Stairs ambulation difficulty, requires equipment (P)      Equipment Currently Used at Home cane, straight (P)      Readmission within 30 days? No (P)      Patient currently being followed by outpatient case management? No (P)      Do you currently have service(s) that help you manage your care at home? No (P)      Do you take prescription medications? Yes (P)      Do you have prescription coverage? Yes (P)      Do you have any problems affording any of your prescribed medications? No (P)      Is the patient taking medications as prescribed? yes (P)      How do you get to doctors appointments? car, drives self;family or friend will provide (P)      Are you on dialysis? No (P)      Do you take coumadin? No (P)      Discharge Plan A Home with family;Home Health (P)      Discharge Plan B Home with family;Home Health (P)      DME Needed Upon Discharge  none (P)      Discharge Plan discussed with: Patient;Spouse/sig other (P)      Transition of Care Barriers None (P)         Physical Activity    On average, how many days per week do you engage in moderate to strenuous exercise (like a brisk walk)? 0 days (P)      On average, how many minutes do you engage in exercise at this level? 0 min (P)         Financial Resource Strain    How hard is it for you to pay for the very basics like food, housing, medical care, and heating? Not hard at all (P)         Housing Stability    In the last 12 months, was there a time when you were not able to pay the mortgage or rent on time? No (P)      At any time in the past 12 months, were you homeless or living in a shelter (including now)? No (P)         Transportation Needs    Has the lack of transportation kept you from medical appointments, meetings, work or from getting things needed for daily living? No (P)         Food Insecurity    Within the past 12 months, you  worried that your food would run out before you got the money to buy more. Never true (P)      Within the past 12 months, the food you bought just didn't last and you didn't have money to get more. Never true (P)         Stress    Do you feel stress - tense, restless, nervous, or anxious, or unable to sleep at night because your mind is troubled all the time - these days? Not at all (P)         Social Isolation    How often do you feel lonely or isolated from those around you?  Never (P)         Alcohol Use    Q1: How often do you have a drink containing alcohol? Never (P)      Q2: How many drinks containing alcohol do you have on a typical day when you are drinking? Patient does not drink (P)      Q3: How often do you have six or more drinks on one occasion? Never (P)         [employer]    In the past 12 months has the electric, gas, oil, or water company threatened to shut off services in your home? No (P)         Health Literacy    How often do you need to have someone help you when you read instructions, pamphlets, or other written material from your doctor or pharmacy? Never (P)         Transportation Needs    In the past 12 months, has lack of transportation kept you from medical appointments or from getting medications? No (P)      In the past 12 months, has lack of transportation kept you from meetings, work, or from getting things needed for daily living? No (P)         Social Connections    In a typical week, how many times do you talk on the phone with family, friends, or neighbors? Never (P)      How often do you get together with friends or relatives? Never (P)      How often do you attend Religion or Evangelical services? Never (P)      Do you belong to any clubs or organizations such as Religion groups, unions, fraternal or athletic groups, or school groups? No (P)      How often do you attend meetings of the clubs or organizations you belong to? Never (P)      Are you , , , ,  never , or living with a partner?  (P)

## 2025-03-17 NOTE — ASSESSMENT & PLAN NOTE
Patient has a diagnosis of pneumonia. The cause of the pneumonia is suspected to be bacterial in etiology but organism is not known. The pneumonia is stable. The patient has the following signs/symptoms of pneumonia: cough and sputum production. The patient does not have a current oxygen requirement and the patient does not have a home oxygen requirement. I have reviewed the pertinent imaging. The following cultures have been collected: Blood cultures and Sputum culture The culture results are listed below.     Current antimicrobial regimen consists of the antibiotics listed below. Will monitor patient closely and Adjust treatment plan as follows      Antibiotics (From admission, onward)      Start     Stop Route Frequency Ordered    03/17/25 1315  levoFLOXacin tablet 750 mg         03/23/25 0859 Oral Every other day 03/17/25 1303          Microbiology Results (last 7 days)       Procedure Component Value Units Date/Time    Blood culture x two cultures. Draw prior to antibiotics. [[id number]] Collected: 03/16/25 0217    Order Status: Completed Specimen: Blood from Peripheral, Hand, Right Updated: 03/17/25 0614     Blood Culture, Routine No Growth to date      No Growth to date    Narrative:      Aerobic and anaerobic    Blood culture x two cultures. Draw prior to antibiotics. [[id number]] Collected: 03/16/25 0139    Order Status: Completed Specimen: Blood from Peripheral, Upper Arm, Left Updated: 03/17/25 0614     Blood Culture, Routine No Growth to date      No Growth to date    Narrative:      Aerobic and anaerobic    MRSA Screen by PCR [[id number]] Collected: 03/16/25 2019    Order Status: Completed Specimen: Nasal Swab Updated: 03/16/25 2228     MRSA SCREEN BY PCR Not Detected    Respiratory Infection Panel (PCR), Nasopharyngeal [[id number]] Collected: 03/16/25 1211    Order Status: Completed Specimen: Nasopharyngeal Swab Updated: 03/16/25 1344     Respiratory Infection Panel Source NP Swab     Adenovirus Not  Detected     Coronavirus 229E, Common Cold Virus Not Detected     Coronavirus HKU1, Common Cold Virus Not Detected     Coronavirus NL63, Common Cold Virus Not Detected     Coronavirus OC43, Common Cold Virus Not Detected     Comment: The Coronavirus strains detected in this test cause the common cold.  These strains are not the COVID-19 (novel Coronavirus)strain   associated with the respiratory disease outbreak.          SARS-CoV2 (COVID-19) Qualitative PCR Not Detected     Human Metapneumovirus Not Detected     Human Rhinovirus/Enterovirus Not Detected     Influenza A Not Detected     Influenza B Not Detected     Parainfluenza Virus 1 Not Detected     Parainfluenza Virus 2 Not Detected     Parainfluenza Virus 3 Not Detected     Parainfluenza Virus 4 Not Detected     Respiratory Syncytial Virus Not Detected     Bordetella Parapertussis ([id number]) Not Detected     Bordetella pertussis (ptxP) Not Detected     Chlamydia pneumoniae Not Detected     Mycoplasma pneumoniae Not Detected    Narrative:      Assay not valid for lower respiratory specimens, alternate  testing required.    Culture, Respiratory with Gram Stain [[id number]]     Order Status: No result Specimen: Respiratory     Influenza A & B by Molecular [[id number]] Collected: 03/16/25 0142    Order Status: Completed Specimen: Nasopharyngeal Swab Updated: 03/16/25 0222     Influenza A, Molecular Negative     Influenza B, Molecular Negative     Flu A & B Source Nasal swab        COVID, flu -ve  RUL consolidation on CXR  MRSA PCR -ve; d/c'd vanc  Rcx, RIP -ve  Continue BSA (vanc, pip-tazo); add azithro if clinically worsens  Transitioned to PO levofloxacin 750 mg QOD (renally dosed) to complete 7 day course (EED 03/22)

## 2025-03-17 NOTE — ASSESSMENT & PLAN NOTE
"Status post kidney transplant - CKD 3b/4   Baseline Creatinine: 2.3-2.6  Creatinine at time of consult: 2.4  Tests done: UA (3+ protein, 4+ glucose) , UPCR 5.57 in 12/24 ,     Hypokalemia (3.2) and hyponatremia (131):  - Replace potassium    Long term current use of immunosuppressive drug  - Home:  BID, Pred 5, Tac 0.5 QD  - Current: Tac 0.5 QID  - Resume pred 5 home dose  - Increase the Tacrolimus to 0.5 BID  - Daily Tacrolimus level , 12 hours after the dose  - Continue to suspend the MMF. Resume once the infection revolves and antibiotics are not required.     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   - He remains in immunosuppressed state, at risk of infections and, rejection, and toxicity.    - Monitor for side effects and toxicities, given narrow therapeutic window and significant risk of AE.        Monitor serum chemistries daily, strict intake and output Qshift , daily weights if able.  Renal protective measures: Please adjust medications for reduced clearance  Maintain MAP > 65  Transfuse for Hb <7    "

## 2025-03-17 NOTE — PROGRESS NOTES
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 He reports a wet cough that started the night of his admission. He was recently hospitalized for syncope last month and discharged on a 30 day cardiac monitor. Beta blocker held then due to iatrogenic bradycardia to the 20s. He is on MMF, Tacrolimus and Prednisone. He denies dysuria or suprapubic pain. He does not have any open wounds.     In the ED, he was afebrile, HR 70, /70. Labs remarkable for WBC 14, Hg 10 (baseline), Na 131, K 2.8, BUN 39, Cr 2.5 (baseline). At 3:13AM, he had an 18 beat run of Vtach before he had electrolyte repletion. He reported palpitations during the event that resolved. He denied chest pain, abdominal pain, shortness of breath and nausea.  CXR was revealing for RUL consolidation concerning for hospital acquired pneumonia given his recent hospitalization. He was admitted for pneumonia and placed on broad spectrum antibiotics.     Overview/Hospital Course:  Admitted for RUL HAP. KTM consulted for tacro dosing as well as PHUONG; resumed prednisone & held MMF. Started on empiric vanc & pip-tazo. MRSA -ve; d/c'd vanc. Clinically improved; transitioned pip-tazo to PO levofloxacin (renally dosed) to complete 7 day course (EED 03/22).    Objective    Interval History: NAEON; clinically improved; vanc d/c'd  d/t -ve MRSA PCR. Transitioned pip-tazo to levofloxacin (EED 03/22). KTM following; adjusting tacro dose; resumed pred, holding MMF. HTN, resumed ARB. Plan for d/c tmr    Review of Systems  Objective:     Vital Signs (Most Recent):  Temp: 98.3 °F (36.8 °C) (03/17/25 1137)  Pulse: 100 (03/17/25 1137)  Resp: 17 (03/17/25 1137)  BP: (!) 158/82 (03/17/25 1137)  SpO2: 97 % (03/17/25 1137) Vital Signs (24h Range):  Temp:  [98 °F (36.7 °C)-99.6 °F (37.6 °C)] 98.3 °F (36.8 °C)  Pulse:  [] 100  Resp:  [17-20] 17  SpO2:  [95 %-99 %] 97 %  BP: (150-188)/(70-93) 158/82     Weight: 91.6 kg (201 lb 15.1 oz)  Body mass index is 27.39 kg/m².    Intake/Output Summary (Last 24 hours) at 3/17/2025 1312  Last data filed at 3/17/2025 0745  Gross per 24 hour   Intake 596.46 ml   Output 700 ml   Net -103.54 ml         Physical Exam  Constitutional:       General: He is not in acute distress.  HENT:      Head: Normocephalic and atraumatic.   Eyes:      Extraocular Movements: Extraocular movements intact.   Cardiovascular:      Rate and Rhythm: Normal rate and regular rhythm.   Pulmonary:      Effort: Pulmonary effort is normal. No respiratory distress.      Breath sounds: Rales present. No wheezing.      Comments: Faint RUL rales  Abdominal:      General: There is distension.      Palpations: Abdomen is soft.      Tenderness: There is no abdominal tenderness. There is no guarding or rebound.   Musculoskeletal:      Right lower leg: No edema.      Left lower leg: No edema.   Skin:     General: Skin is warm and dry.   Neurological:      Mental Status: He is alert. Mental status is at baseline.   Psychiatric:         Mood and Affect: Mood normal.         Behavior: Behavior normal.               Significant Labs: All pertinent labs within the past 24 hours have been reviewed.    Significant Imaging: I have reviewed all pertinent imaging results/findings within the past 24 hours.    Assessment and Plan    Assessment &  Plan  Hospital-acquired pneumonia  Patient has a diagnosis of pneumonia. The cause of the pneumonia is suspected to be bacterial in etiology but organism is not known. The pneumonia is stable. The patient has the following signs/symptoms of pneumonia: cough and sputum production. The patient does not have a current oxygen requirement and the patient does not have a home oxygen requirement. I have reviewed the pertinent imaging. The following cultures have been collected: Blood cultures and Sputum culture The culture results are listed below.     Current antimicrobial regimen consists of the antibiotics listed below. Will monitor patient closely and Adjust treatment plan as follows      Antibiotics (From admission, onward)      Start     Stop Route Frequency Ordered    03/17/25 1315  levoFLOXacin tablet 750 mg         03/23/25 0859 Oral Every other day 03/17/25 1303          Microbiology Results (last 7 days)       Procedure Component Value Units Date/Time    Blood culture x two cultures. Draw prior to antibiotics. [[id number]] Collected: 03/16/25 0217    Order Status: Completed Specimen: Blood from Peripheral, Hand, Right Updated: 03/17/25 0614     Blood Culture, Routine No Growth to date      No Growth to date    Narrative:      Aerobic and anaerobic    Blood culture x two cultures. Draw prior to antibiotics. [[id number]] Collected: 03/16/25 0139    Order Status: Completed Specimen: Blood from Peripheral, Upper Arm, Left Updated: 03/17/25 0614     Blood Culture, Routine No Growth to date      No Growth to date    Narrative:      Aerobic and anaerobic    MRSA Screen by PCR [[id number]] Collected: 03/16/25 2019    Order Status: Completed Specimen: Nasal Swab Updated: 03/16/25 2228     MRSA SCREEN BY PCR Not Detected    Respiratory Infection Panel (PCR), Nasopharyngeal [[id number]] Collected: 03/16/25 1211    Order Status: Completed Specimen: Nasopharyngeal Swab Updated: 03/16/25 1344     Respiratory Infection Panel  Source NP Swab     Adenovirus Not Detected     Coronavirus 229E, Common Cold Virus Not Detected     Coronavirus HKU1, Common Cold Virus Not Detected     Coronavirus NL63, Common Cold Virus Not Detected     Coronavirus OC43, Common Cold Virus Not Detected     Comment: The Coronavirus strains detected in this test cause the common cold.  These strains are not the COVID-19 (novel Coronavirus)strain   associated with the respiratory disease outbreak.          SARS-CoV2 (COVID-19) Qualitative PCR Not Detected     Human Metapneumovirus Not Detected     Human Rhinovirus/Enterovirus Not Detected     Influenza A Not Detected     Influenza B Not Detected     Parainfluenza Virus 1 Not Detected     Parainfluenza Virus 2 Not Detected     Parainfluenza Virus 3 Not Detected     Parainfluenza Virus 4 Not Detected     Respiratory Syncytial Virus Not Detected     Bordetella Parapertussis (HQ0894) Not Detected     Bordetella pertussis (ptxP) Not Detected     Chlamydia pneumoniae Not Detected     Mycoplasma pneumoniae Not Detected    Narrative:      Assay not valid for lower respiratory specimens, alternate  testing required.    Culture, Respiratory with Gram Stain [[id number]]     Order Status: No result Specimen: Respiratory     Influenza A & B by Molecular [[id number]] Collected: 03/16/25 0142    Order Status: Completed Specimen: Nasopharyngeal Swab Updated: 03/16/25 0222     Influenza A, Molecular Negative     Influenza B, Molecular Negative     Flu A & B Source Nasal swab        COVID, flu -ve  RUL consolidation on CXR  MRSA PCR -ve; d/c'd vanc  Rcx, RIP -ve  Continue BSA (vanc, pip-tazo); add azithro if clinically worsens  Transitioned to PO levofloxacin 750 mg QOD (renally dosed) to complete 7 day course (EED 03/22)  Type 2 diabetes mellitus with stage 3a chronic kidney disease, with long-term current use of insulin  Diabetic polyneuropathy associated with type 2 diabetes mellitus  Lab Results   Component Value Date    HGBA1C  8.6 (H) 01/27/2025    HGBA1C 8.1 (H) 12/07/2024    HGBA1C 7.4 (H) 11/19/2024     Recent Labs     03/16/25  0140 03/16/25  0733 03/16/25  1050 03/16/25  1104 03/17/25  0510 03/17/25  0737   POCTGLUCOSE  --    < >  --    < >  --  120*   *  --  274*  --  126*  --     < > = values in this interval not displayed.     Antihyperglycemics (From admission, onward)      Start     Stop Route Frequency Ordered    03/17/25 1130  insulin aspart U-100 pen 5 Units         -- SubQ 3 times daily with meals 03/17/25 1020    03/16/25 0900  empagliflozin (Jardiance) tablet 10 mg        Question Answer Comment   Does this patient have a diagnosis of heart failure? Yes    Does this patient have type 1 diabetes or diabetic ketoacidosis? No    Does this patient have symptomatic hypotension? No    Is the patient NPO or pending major surgery in next 3 days or less? No        -- Oral Daily 03/16/25 0447    03/16/25 0700  insulin glargine U-100 (Lantus) pen 15 Units         -- SubQ Every morning 03/16/25 0517    03/16/25 0549  insulin aspart U-100 pen 0-5 Units         -- SubQ Before meals & nightly PRN 03/16/25 0450        Basal/prandial/SSI titrated PRN goal Glc 140-180  Continue home gabapentin for neuropathy  Hypoglycemia protocol  Hold PO antiglycemics while hospitalized  Diet diabetic   Essential hypertension  Most recent BP: (!) 158/82 (03/17/25 1137); 24 hr range BP  Min: 150/70  Max: 188/93  Current meds: doxazosin tablet 4 mg, Daily, Oral  hydrALAZINE tablet 100 mg, Every 8 hours, Oral  valsartan tablet 320 mg, Daily, Oral  Adjust regimen PRN goal SBP <180  Resume ARB  Hold diuretic in s/o PHUONG   Hyperlipidemia  Continue statin  Persistent atrial fibrillation  Patient has paroxysmal (<7 days) atrial fibrillation. Patient is currently in sinus rhythm. [id number] Score: 3. The patients heart rate in the last 24 hours is as follows: Pulse  Min: 74  Max: 100  Antiarrhythmics:    Anticoagulants: rivaroxaban tablet 15 mg, With dinner,  Oral  Replete lytes with a goal of K>4, Mg >2  Patient is anticoagulated, see medications listed above.  Patient's afib is currently controlled  Elevated troponin  Elevated troponin on admission with no chest pressure/pain, sob, abdominal pain, nausea. It could be related to his underlying infection. Continue to monitor for symptoms. No indication for ACS at this time. ECG w/ some V5-6 changes; ischemia?  Repeat ECG  Cardiac tele  Replace K & Mg PRN  Consider Cards consult  History of CVA (cerebrovascular accident)  Stable  On DOAC & statin; not on DAPT  Wide-complex tachycardia  18 beats of monomorphic, wide complex tachycardia noted on 3/16/25 around 3AM  Repeat ECG  Cardiac tele  Replace K & Mg PRN  Consider Cards consult  S/P cadaveric kidney transplant 11/5/2016. ESRD secondary to HTN/DMII  Immunosuppressed status  History of kidney transplant on immunosuppression  KTM consulted  Hold MMF in s/o infxn  Titrate tacro dose per KTM  Resume prednisone  Daily tacro level     VTE Risk Mitigation (From admission, onward)           Ordered     rivaroxaban tablet 15 mg  With dinner         03/16/25 0447     IP VTE HIGH RISK PATIENT  Once         03/16/25 0447     Place sequential compression device  Until discontinued         03/16/25 0447                Discharge Planning   UBALDO: 3/18/2025     Code Status: Full Code   Medical Readiness for Discharge Date:   Discharge Plan A: Home with family, Home Health            Darwin Leroy MD  Department of Hospital Medicine   [facility] - Internal Medicine Telemetry

## 2025-03-17 NOTE — ASSESSMENT & PLAN NOTE
Lab Results   Component Value Date    HGBA1C 8.6 (H) 01/27/2025    HGBA1C 8.1 (H) 12/07/2024    HGBA1C 7.4 (H) 11/19/2024     Recent Labs     03/16/25  0140 03/16/25  0733 03/16/25  1050 03/16/25  1104 03/17/25  0510 03/17/25  0737   POCTGLUCOSE  --    < >  --    < >  --  120*   *  --  274*  --  126*  --     < > = values in this interval not displayed.     Antihyperglycemics (From admission, onward)      Start     Stop Route Frequency Ordered    03/17/25 1130  insulin aspart U-100 pen 5 Units         -- SubQ 3 times daily with meals 03/17/25 1020    03/16/25 0900  empagliflozin (Jardiance) tablet 10 mg        Question Answer Comment   Does this patient have a diagnosis of heart failure? Yes    Does this patient have type 1 diabetes or diabetic ketoacidosis? No    Does this patient have symptomatic hypotension? No    Is the patient NPO or pending major surgery in next 3 days or less? No        -- Oral Daily 03/16/25 0447    03/16/25 0700  insulin glargine U-100 (Lantus) pen 15 Units         -- SubQ Every morning 03/16/25 0517    03/16/25 0549  insulin aspart U-100 pen 0-5 Units         -- SubQ Before meals & nightly PRN 03/16/25 0450        Basal/prandial/SSI titrated PRN goal Glc 140-180  Continue home gabapentin for neuropathy  Hypoglycemia protocol  Hold PO antiglycemics while hospitalized  Diet diabetic

## 2025-03-17 NOTE — ASSESSMENT & PLAN NOTE
Patient has paroxysmal (<7 days) atrial fibrillation. Patient is currently in sinus rhythm. [id number] Score: 3. The patients heart rate in the last 24 hours is as follows: Pulse  Min: 74  Max: 100  Antiarrhythmics:    Anticoagulants: rivaroxaban tablet 15 mg, With dinner, Oral  Replete lytes with a goal of K>4, Mg >2  Patient is anticoagulated, see medications listed above.  Patient's afib is currently controlled

## 2025-03-17 NOTE — PROGRESS NOTES
Patient wearing 30 day event monitor for diagnosis syncope    [place] [facility] [place]    Strips placed under this encounter for review. Message sent to ordering provider. Will continue to monitor until 3/25/25     Received auto-triggered alert notification for  VT  on March 14, 2025 at 7: 26 AM  Dr. Mo Sierra notified by [employer] on 3/15/25 @ 5:55 AM.           Received auto-triggered alert notification for VT on March 16, 2025 at 12:01 AM, 12:42 AM & 3:04AM  Dr. Branch notified by [employer] on 3/16/25 @ 5:03 AM.

## 2025-03-17 NOTE — HOSPITAL COURSE
Admitted for RUL HAP. KTM consulted for tacro dosing as well as PHUONG; resumed prednisone & held MMF. Started on empiric vanc & pip-tazo. MRSA -ve; d/c'd vanc. Clinically improved; transitioned pip-tazo to PO levofloxacin (renally dosed) to complete 7 day course (EED 03/22). Kidney transplant team monitoring and adjusting tacro levels- will set up follow-up appt upon d/c. Hospital course c/b about 5 watery stools- Cdiff PCR/Ag positive; toxins negative. Patient is AF and WBC wnl.  No further episodes of diarrhea in over 12 hours. However, since patient is immunocompromised and on antibiotic treatment for pneumonia,  we will treat with 10 day course of oral vancomycin. Recommend close f/u with PCP.

## 2025-03-17 NOTE — SUBJECTIVE & OBJECTIVE
"  Subjective:   History of Present Illness:  Referring Physician: Alexi Glasgow  Current Nephrologist: Angélica Munoz    ORGAN: RIGHT KIDNEY  Donor Type: donation after brain death  PHS Increased Risk: yes  Cold Ischemia: 314 mins  Induction Medications: thymoglobulin    Renal Allograft function: CKD stage 4. Functioning well.  Good urine output.  Baseline Creatinine of allograft: 2.3-2.6  Creatinine at time of consult: 2.4  Tests done: UA (3+ protein, 4+ glucose), UPCR 5.57 in 12/24.       SUBJECTIVE   [place]  HPI: 70 yo M with extensive history notable for kidney transplant (2016) on immunosuppression with CKD 3-4, chronic combined CHF (FELICIANO 2/3/25 EF 40-45%, CVA right MCA 12/24, atrial fib/flutter s/p DCCV 02/03/2025 who presents to the ED for a 102F fever at home. He reports a wet cough that started the night of his admission. He was recently hospitalized for syncope last month and discharged on a 30 day cardiac monitor.        Na 131, K 2.8, BUN 39, Cr 2.5 (baseline). At 3:13AM,   CXR was revealing for RUL consolidation concerning for hospital acquired pneumonia given his recent hospitalization. He was admitted for pneumonia and placed on broad spectrum antibiotics.       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 2 day de los santos, KDPI 85%, induction through thymoglobulin, PRA range 24%)    Was on PD for 1 and half years, with access peritoneal catheter.    EF on last echo was 50-55% and PAS 30 mmHg    Donor/Recipient: CMV ++ , HCV ++    Donor was also +RPR    Tx Surgery:  Tx [place] was done at [facility] [place]. Had de los santos for 3 days, 1 drain place, Stent were required.      BK virus infection screening:   No results found for: "BKVIRUSDNAUR", " ""BKQUANTURINE", "BKVIRUSLOG", "BKVIRUSURINE"      Lab Results   Component Value Date    MICALBCREAT Unable to calculate 12/06/2024    CREATININE 2.4 (H) 03/17/2025         Hospital Course:  No notes on file    Interval History:  Sitting in bed. Had an episode of diarrhea this morning. Feeling sluggish due to that. On room air. Alert. He said that he is running low grade fever. Having headaches. UOP is good. Denied blood in stool and urine.    Past Medical, Surgical, Family, and Social History:   Unchanged from H&P.    Scheduled Meds:   atorvastatin  80 mg Oral Daily    doxazosin  4 mg Oral Daily    empagliflozin  10 mg Oral Daily    ergocalciferol  50,000 Units Oral Q7 Days    gabapentin  100 mg Oral TID    hydrALAZINE  100 mg Oral Q8H    insulin aspart U-100  5 Units Subcutaneous TIDWM    insulin glargine U-100 (Lantus)  15 Units Subcutaneous QAM    k phos di & mono-sod phos mono  1 tablet Oral BID    piperacillin-tazobactam (Zosyn) IV (PEDS and ADULTS) (extended infusion is not appropriate)  4.5 g Intravenous Q8H    rivaroxaban  15 mg Oral Daily with dinner    tacrolimus  0.5 mg Oral Daily AM     Continuous Infusions:  PRN Meds:  Current Facility-Administered Medications:     acetaminophen, 650 mg, Oral, Q6H PRN    dextrose 50%, 12.5 g, Intravenous, PRN    dextrose 50%, 25 g, Intravenous, PRN    glucagon (human recombinant), 1 mg, Intramuscular, PRN    glucose, 16 g, Oral, PRN    glucose, 24 g, Oral, PRN    insulin aspart U-100, 0-5 Units, Subcutaneous, QID (AC + HS) PRN    melatonin, 6 mg, Oral, Nightly PRN    naloxone, 0.02 mg, Intravenous, PRN    sodium chloride 0.9%, 10 mL, Intravenous, Q12H PRN    Intake/Output - Last 3 Shifts         03/15 0700  03/16 0659 03/16 0700 03/17 0659 03/17 0700 03/18 0659    P.O.  360     I.V. (mL/kg)   47.4 (0.5)    IV Piggyback  296.9 91    Total Intake(mL/kg)  656.9 (7.2) 138.4 (1.5)    Urine (mL/kg/hr)  1100 (0.5)     Emesis/NG output  0     Stool  0     Total Output  1100  "    Net  -443.1 +138.4                    Review of Systems   Constitutional: Negative.       Objective:     Vital Signs (Most Recent):  Temp: 98.3 °F (36.8 °C) (03/17/25 1137)  Pulse: 100 (03/17/25 1137)  Resp: 17 (03/17/25 1137)  BP: (!) 158/82 (03/17/25 1137)  SpO2: 97 % (03/17/25 1137) Vital Signs (24h Range):  Temp:  [98 °F (36.7 °C)-99.6 °F (37.6 °C)] 98.3 °F (36.8 °C)  Pulse:  [] 100  Resp:  [17-20] 17  SpO2:  [95 %-99 %] 97 %  BP: (150-188)/(70-93) 158/82     Weight: 91.6 kg (201 lb 15.1 oz)  Height: 6' (182.9 cm)  Body mass index is 27.39 kg/m².     Physical Exam  HENT:      Head: Normocephalic and atraumatic.      Mouth/Throat:      Mouth: Mucous membranes are moist.   Cardiovascular:      Rate and Rhythm: Normal rate.   Pulmonary:      Effort: Pulmonary effort is normal.   Abdominal:      General: There is distension.      Palpations: Abdomen is soft.   Musculoskeletal:      Right lower leg: No edema.      Left lower leg: No edema.          Laboratory:  CBC:   Recent Labs   Lab 03/16/25  0140 03/16/25  0151 03/17/25  0510   WBC 13.98*  --  11.82   RBC 4.29*  --  3.92*   HGB 9.9*  --  8.8*   HCT 32.7* 31.5 29.1*     --  136*   MCV 76*  --  74*   MCH 23.1*  --  22.4*   MCHC 30.3*  --  30.2*     BMP:   Recent Labs   Lab 03/16/25  0140 03/16/25  1050 03/16/25  1335 03/17/25  0510   * 274*  --  126*   * 132*  --  131*   K 2.8* 3.7 3.5 3.2*   CL 99 103  --  98   CO2 20* 17*  --  22*   BUN 39* 33*  --  38*   CREATININE 2.5* 2.2*  --  2.4*   CALCIUM 8.3* 7.3*  --  7.8*     Labs within the past 24 hours have been reviewed.

## 2025-03-17 NOTE — PROGRESS NOTES
[facility] - Internal Medicine Telemetry  Kidney Transplant  Progress Note      Reason for Follow-up: Reassessment of Kidney Transplant - 11/5/2016  (#1) recipient and management of immunosuppression.         Subjective:   History of Present Illness:  Referring Physician: Alexi Glasgow  Current Nephrologist: Angélica Munoz    ORGAN: RIGHT KIDNEY  Donor Type: donation after brain death  PHS Increased Risk: yes  Cold Ischemia: 314 mins  Induction Medications: thymoglobulin    Renal Allograft function: CKD stage 4. Functioning well.  Good urine output.  Baseline Creatinine of allograft: 2.3-2.6  Creatinine at time of consult: 2.4  Tests done: UA (3+ protein, 4+ glucose), UPCR 5.57 in 12/24.       SUBJECTIVE   [place]  HPI: 68 yo M with extensive history notable for kidney transplant (2016) on immunosuppression with CKD 3-4, chronic combined CHF (FELICIANO 2/3/25 EF 40-45%, CVA right MCA 12/24, atrial fib/flutter s/p DCCV 02/03/2025 who presents to the ED for a 102F fever at home. He reports a wet cough that started the night of his admission. He was recently hospitalized for syncope last month and discharged on a 30 day cardiac monitor.        Na 131, K 2.8, BUN 39, Cr 2.5 (baseline). At 3:13AM,   CXR was revealing for RUL consolidation concerning for hospital acquired pneumonia given his recent hospitalization. He was admitted for pneumonia and placed on broad spectrum antibiotics.       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 2 day de los santos, KDPI 85%, induction through thymoglobulin, PRA range 24%)    Was on PD for 1 and half years, with access peritoneal catheter.    EF on last echo was 50-55% and PAS 30 mmHg    Donor/Recipient: CMV ++ , HCV ++   "  Donor was also +RPR    Tx Surgery:  Tx surgery was done at [facility] [place]. Had de los santos for 3 days, 1 drain place, Stent were required.      BK virus infection screening:   No results found for: "BKVIRUSDNAUR", "BKQUANTURINE", "BKVIRUSLOG", "BKVIRUSURINE"      Lab Results   Component Value Date    MICALBCREAT Unable to calculate 12/06/2024    CREATININE 2.4 (H) 03/17/2025         Hospital Course:  No notes on file    Interval History:  Sitting in bed. Had an episode of diarrhea this morning. Feeling sluggish due to that. On room air. Alert. He said that he is running low grade fever. Having headaches. UOP is good. Denied blood in stool and urine.    Past Medical, Surgical, Family, and Social History:   Unchanged from H&P.    Scheduled Meds:   atorvastatin  80 mg Oral Daily    doxazosin  4 mg Oral Daily    empagliflozin  10 mg Oral Daily    ergocalciferol  50,000 Units Oral Q7 Days    gabapentin  100 mg Oral TID    hydrALAZINE  100 mg Oral Q8H    insulin aspart U-100  5 Units Subcutaneous TIDWM    insulin glargine U-100 (Lantus)  15 Units Subcutaneous QAM    k phos di & mono-sod phos mono  1 tablet Oral BID    piperacillin-tazobactam (Zosyn) IV (PEDS and ADULTS) (extended infusion is not appropriate)  4.5 g Intravenous Q8H    rivaroxaban  15 mg Oral Daily with dinner    tacrolimus  0.5 mg Oral Daily AM     Continuous Infusions:  PRN Meds:  Current Facility-Administered Medications:     acetaminophen, 650 mg, Oral, Q6H PRN    dextrose 50%, 12.5 g, Intravenous, PRN    dextrose 50%, 25 g, Intravenous, PRN    glucagon (human recombinant), 1 mg, Intramuscular, PRN    glucose, 16 g, Oral, PRN    glucose, 24 g, Oral, PRN    insulin aspart U-100, 0-5 Units, Subcutaneous, QID (AC + HS) PRN    melatonin, 6 mg, Oral, Nightly PRN    naloxone, 0.02 mg, Intravenous, PRN    sodium chloride 0.9%, 10 mL, Intravenous, Q12H PRN    Intake/Output - Last 3 Shifts         03/15 0700 03/16 0659 03/16 0700 03/17 0659 03/17 0700 03/18 0659    P.O.  " 360     I.V. (mL/kg)   47.4 (0.5)    IV Piggyback  296.9 91    Total Intake(mL/kg)  656.9 (7.2) 138.4 (1.5)    Urine (mL/kg/hr)  1100 (0.5)     Emesis/NG output  0     Stool  0     Total Output  1100     Net  -443.1 +138.4                    Review of Systems   Constitutional: Negative.       Objective:     Vital Signs (Most Recent):  Temp: 98.3 °F (36.8 °C) (03/17/25 1137)  Pulse: 100 (03/17/25 1137)  Resp: 17 (03/17/25 1137)  BP: (!) 158/82 (03/17/25 1137)  SpO2: 97 % (03/17/25 1137) Vital Signs (24h Range):  Temp:  [98 °F (36.7 °C)-99.6 °F (37.6 °C)] 98.3 °F (36.8 °C)  Pulse:  [] 100  Resp:  [17-20] 17  SpO2:  [95 %-99 %] 97 %  BP: (150-188)/(70-93) 158/82     Weight: 91.6 kg (201 lb 15.1 oz)  Height: 6' (182.9 cm)  Body mass index is 27.39 kg/m².     Physical Exam  HENT:      Head: Normocephalic and atraumatic.      Mouth/Throat:      Mouth: Mucous membranes are moist.   Cardiovascular:      Rate and Rhythm: Normal rate.   Pulmonary:      Effort: Pulmonary effort is normal.   Abdominal:      General: There is distension.      Palpations: Abdomen is soft.   Musculoskeletal:      Right lower leg: No edema.      Left lower leg: No edema.          Laboratory:  CBC:   Recent Labs   Lab 03/16/25  0140 03/16/25  0151 03/17/25  0510   WBC 13.98*  --  11.82   RBC 4.29*  --  3.92*   HGB 9.9*  --  8.8*   HCT 32.7* 31.5 29.1*     --  136*   MCV 76*  --  74*   MCH 23.1*  --  22.4*   MCHC 30.3*  --  30.2*     BMP:   Recent Labs   Lab 03/16/25  0140 03/16/25  1050 03/16/25  1335 03/17/25  0510   * 274*  --  126*   * 132*  --  131*   K 2.8* 3.7 3.5 3.2*   CL 99 103  --  98   CO2 20* 17*  --  22*   BUN 39* 33*  --  38*   CREATININE 2.5* 2.2*  --  2.4*   CALCIUM 8.3* 7.3*  --  7.8*     Labs within the past 24 hours have been reviewed.    Assessment/Plan:     S/P cadaveric kidney transplant 11/5/2016. ESRD secondary to HTN/DMII  Status post kidney transplant - CKD 3b/4   Baseline Creatinine:  "2.3-2.6  Creatinine at time of consult: 2.4  Tests done: UA (3+ protein, 4+ glucose) , UPCR 5.57 in 12/24 ,     Hypokalemia (3.2) and hyponatremia (131):  - Replace potassium    Long term current use of immunosuppressive drug  - Home:  BID, Pred 5, Tac 0.5 QD  - Current: Tac 0.5 QID  - Resume pred 5 home dose  - Increase the Tacrolimus to 0.5 BID  - Daily Tacrolimus level , 12 hours after the dose  - Continue to suspend the MMF. Resume once the infection revolves and antibiotics are not required.     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   - He remains in immunosuppressed state, at risk of infections and, rejection, and toxicity.    - Monitor for side effects and toxicities, given narrow therapeutic window and significant risk of AE.        Monitor serum chemistries daily, strict intake and output Qshift , daily weights if able.  Renal protective measures: Please adjust medications for reduced clearance  Maintain MAP > 65  Transfuse for Hb <7          Due to above pt is at high risk of mortality.    Harpreet Casatno MD  Kidney Transplant  [facility] - Internal Medicine Telemetry    "

## 2025-03-17 NOTE — ED NOTES
Telemetry Verification   Patient placed on Telemetry Box  Verified with War Room  Box # [id number]   Rate 75   Rhythm Normal sinus

## 2025-03-17 NOTE — CARE UPDATE
Unit [facility] Care Support Interaction      I have reviewed the chart of Ravi Zamorano who is hospitalized for Hospital-acquired pneumonia. The patient is currently located in the following unit: IMTA    I have seen and examined the patient and provided the following support:     Readmission Reduction - Acute Care at Home Referral       Laurence Jett PA-C  Unit Based [facility]

## 2025-03-18 LAB
ALBUMIN SERPL BCP-MCNC: 2.5 G/DL (ref 3.5–5.2)
ALP SERPL-CCNC: 44 U/L (ref 40–150)
ALT SERPL W/O P-5'-P-CCNC: <5 U/L (ref 10–44)
ANION GAP SERPL CALC-SCNC: 12 MMOL/L (ref 8–16)
AST SERPL-CCNC: 10 U/L (ref 10–40)
BASOPHILS # BLD AUTO: 0.01 K/UL (ref 0–0.2)
BASOPHILS NFR BLD: 0.1 % (ref 0–1.9)
BILIRUB SERPL-MCNC: 0.4 MG/DL (ref 0.1–1)
BUN SERPL-MCNC: 37 MG/DL (ref 8–23)
C DIFF GDH STL QL: POSITIVE
C DIFF TOX A+B STL QL IA: NEGATIVE
C DIFF TOX GENS STL QL NAA+PROBE: POSITIVE
CALCIUM SERPL-MCNC: 8.2 MG/DL (ref 8.7–10.5)
CHLORIDE SERPL-SCNC: 100 MMOL/L (ref 95–110)
CO2 SERPL-SCNC: 22 MMOL/L (ref 23–29)
CREAT SERPL-MCNC: 2.5 MG/DL (ref 0.5–1.4)
DIFFERENTIAL METHOD BLD: ABNORMAL
EOSINOPHIL # BLD AUTO: 0 K/UL (ref 0–0.5)
EOSINOPHIL NFR BLD: 0.3 % (ref 0–8)
ERYTHROCYTE [DISTWIDTH] IN BLOOD BY AUTOMATED COUNT: 16.2 % (ref 11.5–14.5)
EST. GFR  (NO RACE VARIABLE): 27.5 ML/MIN/1.73 M^2
GLUCOSE SERPL-MCNC: 124 MG/DL (ref 70–110)
HCT VFR BLD AUTO: 28.2 % (ref 40–54)
HGB BLD-MCNC: 8.5 G/DL (ref 14–18)
IMM GRANULOCYTES # BLD AUTO: 0.05 K/UL (ref 0–0.04)
IMM GRANULOCYTES NFR BLD AUTO: 0.7 % (ref 0–0.5)
LYMPHOCYTES # BLD AUTO: 0.4 K/UL (ref 1–4.8)
LYMPHOCYTES NFR BLD: 4.8 % (ref 18–48)
MAGNESIUM SERPL-MCNC: 2.3 MG/DL (ref 1.6–2.6)
MCH RBC QN AUTO: 22.3 PG (ref 27–31)
MCHC RBC AUTO-ENTMCNC: 30.1 G/DL (ref 32–36)
MCV RBC AUTO: 74 FL (ref 82–98)
MONOCYTES # BLD AUTO: 1.1 K/UL (ref 0.3–1)
MONOCYTES NFR BLD: 14.5 % (ref 4–15)
NEUTROPHILS # BLD AUTO: 5.8 K/UL (ref 1.8–7.7)
NEUTROPHILS NFR BLD: 79.6 % (ref 38–73)
NRBC BLD-RTO: 0 /100 WBC
PHOSPHATE SERPL-MCNC: 3.3 MG/DL (ref 2.7–4.5)
PLATELET # BLD AUTO: 120 K/UL (ref 150–450)
PMV BLD AUTO: ABNORMAL FL (ref 9.2–12.9)
POCT GLUCOSE: 128 MG/DL (ref 70–110)
POCT GLUCOSE: 152 MG/DL (ref 70–110)
POCT GLUCOSE: 173 MG/DL (ref 70–110)
POCT GLUCOSE: 193 MG/DL (ref 70–110)
POCT GLUCOSE: 237 MG/DL (ref 70–110)
POTASSIUM SERPL-SCNC: 3.4 MMOL/L (ref 3.5–5.1)
PROT SERPL-MCNC: 5.8 G/DL (ref 6–8.4)
RBC # BLD AUTO: 3.81 M/UL (ref 4.6–6.2)
SODIUM SERPL-SCNC: 134 MMOL/L (ref 136–145)
TACROLIMUS BLD-MCNC: 2.8 NG/ML (ref 5–15)
TACROLIMUS BLD-MCNC: 2.8 NG/ML (ref 5–15)
WBC # BLD AUTO: 7.33 K/UL (ref 3.9–12.7)

## 2025-03-18 PROCEDURE — 25000003 PHARM REV CODE 250: Mod: HCNC

## 2025-03-18 PROCEDURE — 99233 SBSQ HOSP IP/OBS HIGH 50: CPT | Mod: HCNC,GC,, | Performed by: STUDENT IN AN ORGANIZED HEALTH CARE EDUCATION/TRAINING PROGRAM

## 2025-03-18 PROCEDURE — 87493 C DIFF AMPLIFIED PROBE: CPT | Mod: HCNC

## 2025-03-18 PROCEDURE — 63600175 PHARM REV CODE 636 W HCPCS: Mod: HCNC | Performed by: STUDENT IN AN ORGANIZED HEALTH CARE EDUCATION/TRAINING PROGRAM

## 2025-03-18 PROCEDURE — 83735 ASSAY OF MAGNESIUM: CPT | Mod: HCNC

## 2025-03-18 PROCEDURE — 80053 COMPREHEN METABOLIC PANEL: CPT | Mod: HCNC

## 2025-03-18 PROCEDURE — 84100 ASSAY OF PHOSPHORUS: CPT | Mod: HCNC

## 2025-03-18 PROCEDURE — 85025 COMPLETE CBC W/AUTO DIFF WBC: CPT | Mod: HCNC

## 2025-03-18 PROCEDURE — 27000207 HC ISOLATION: Mod: HCNC

## 2025-03-18 PROCEDURE — 87324 CLOSTRIDIUM AG IA: CPT | Mod: HCNC

## 2025-03-18 PROCEDURE — 21400001 HC TELEMETRY ROOM: Mod: HCNC

## 2025-03-18 PROCEDURE — 80197 ASSAY OF TACROLIMUS: CPT | Mod: HCNC

## 2025-03-18 PROCEDURE — 25000003 PHARM REV CODE 250: Mod: HCNC | Performed by: INTERNAL MEDICINE

## 2025-03-18 PROCEDURE — 36415 COLL VENOUS BLD VENIPUNCTURE: CPT | Mod: HCNC

## 2025-03-18 RX ORDER — LOPERAMIDE HYDROCHLORIDE 2 MG/1
2 CAPSULE ORAL 4 TIMES DAILY PRN
Status: DISCONTINUED | OUTPATIENT
Start: 2025-03-18 | End: 2025-03-18

## 2025-03-18 RX ORDER — GABAPENTIN 100 MG/1
200 CAPSULE ORAL ONCE
Status: COMPLETED | OUTPATIENT
Start: 2025-03-18 | End: 2025-03-18

## 2025-03-18 RX ORDER — POTASSIUM CHLORIDE 20 MEQ/1
40 TABLET, EXTENDED RELEASE ORAL
Status: COMPLETED | OUTPATIENT
Start: 2025-03-18 | End: 2025-03-18

## 2025-03-18 RX ORDER — TACROLIMUS 1 MG/1
1 CAPSULE ORAL 2 TIMES DAILY
Status: DISCONTINUED | OUTPATIENT
Start: 2025-03-18 | End: 2025-03-19 | Stop reason: HOSPADM

## 2025-03-18 RX ORDER — LOPERAMIDE HYDROCHLORIDE 2 MG/1
2 CAPSULE ORAL 4 TIMES DAILY PRN
Qty: 40 CAPSULE | Refills: 0 | Status: SHIPPED | OUTPATIENT
Start: 2025-03-18 | End: 2025-03-19 | Stop reason: HOSPADM

## 2025-03-18 RX ORDER — LANOLIN ALCOHOL/MO/W.PET/CERES
400 CREAM (GRAM) TOPICAL 2 TIMES DAILY
Qty: 60 TABLET | Refills: 11 | Status: SHIPPED | OUTPATIENT
Start: 2025-03-18 | End: 2026-03-18

## 2025-03-18 RX ADMIN — GABAPENTIN 100 MG: 100 CAPSULE ORAL at 03:03

## 2025-03-18 RX ADMIN — HYDRALAZINE HYDROCHLORIDE 100 MG: 25 TABLET ORAL at 01:03

## 2025-03-18 RX ADMIN — INSULIN ASPART 5 UNITS: 100 INJECTION, SOLUTION INTRAVENOUS; SUBCUTANEOUS at 06:03

## 2025-03-18 RX ADMIN — TACROLIMUS 0.5 MG: 0.5 CAPSULE ORAL at 08:03

## 2025-03-18 RX ADMIN — PREDNISONE 5 MG: 5 TABLET ORAL at 08:03

## 2025-03-18 RX ADMIN — ATORVASTATIN CALCIUM 80 MG: 40 TABLET, FILM COATED ORAL at 08:03

## 2025-03-18 RX ADMIN — POTASSIUM CHLORIDE 40 MEQ: 1500 TABLET, EXTENDED RELEASE ORAL at 01:03

## 2025-03-18 RX ADMIN — TACROLIMUS 1 MG: 1 CAPSULE ORAL at 06:03

## 2025-03-18 RX ADMIN — HYDRALAZINE HYDROCHLORIDE 100 MG: 25 TABLET ORAL at 05:03

## 2025-03-18 RX ADMIN — GABAPENTIN 100 MG: 100 CAPSULE ORAL at 09:03

## 2025-03-18 RX ADMIN — POTASSIUM CHLORIDE 40 MEQ: 1500 TABLET, EXTENDED RELEASE ORAL at 09:03

## 2025-03-18 RX ADMIN — DOXAZOSIN 4 MG: 2 TABLET ORAL at 08:03

## 2025-03-18 RX ADMIN — Medication 1 CAPSULE: at 01:03

## 2025-03-18 RX ADMIN — GABAPENTIN 200 MG: 100 CAPSULE ORAL at 09:03

## 2025-03-18 RX ADMIN — RIVAROXABAN 15 MG: 10 TABLET, FILM COATED ORAL at 06:03

## 2025-03-18 RX ADMIN — INSULIN ASPART 5 UNITS: 100 INJECTION, SOLUTION INTRAVENOUS; SUBCUTANEOUS at 08:03

## 2025-03-18 RX ADMIN — INSULIN ASPART 5 UNITS: 100 INJECTION, SOLUTION INTRAVENOUS; SUBCUTANEOUS at 01:03

## 2025-03-18 RX ADMIN — EMPAGLIFLOZIN 10 MG: 10 TABLET, FILM COATED ORAL at 08:03

## 2025-03-18 RX ADMIN — GABAPENTIN 200 MG: 100 CAPSULE ORAL at 12:03

## 2025-03-18 RX ADMIN — VALSARTAN 320 MG: 160 TABLET, FILM COATED ORAL at 08:03

## 2025-03-18 RX ADMIN — HYDRALAZINE HYDROCHLORIDE 100 MG: 25 TABLET ORAL at 09:03

## 2025-03-18 RX ADMIN — INSULIN GLARGINE 15 UNITS: 100 INJECTION, SOLUTION SUBCUTANEOUS at 08:03

## 2025-03-18 RX ADMIN — GABAPENTIN 100 MG: 100 CAPSULE ORAL at 08:03

## 2025-03-18 NOTE — ASSESSMENT & PLAN NOTE
Patient's most recent potassium results are listed below.   Recent Labs     03/17/25  0510 03/17/25  1623 03/18/25  0515   K 3.2* 3.5 3.4*     Plan  - Replete potassium per protocol  - Monitor potassium Daily  - Patient's hypokalemia is stable

## 2025-03-18 NOTE — ASSESSMENT & PLAN NOTE
"*Diarrhea:  Likely due to medications - RO infection  Consider Imodium if required    Status post kidney transplant - CKD 3b/4   Baseline Creatinine: 2.3-2.6  Creatinine at time of consult: 2.4  Tests done: UA (3+ protein, 4+ glucose) , UPCR 5.57 in 12/24 ,     Long term current use of immunosuppressive drug  - Home:  BID, Pred 5, Tac 0.5 QD  - Current: Tac 0.5 QID  - Increased Tacrolimus to 1/1  - Daily Tacrolimus level , 12 hours after the dose  - Continue to suspend the MMF. Resume once the infection revolves and antibiotics are not required.     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   - He remains in immunosuppressed state, at risk of infections and, rejection, and toxicity.    - Monitor for side effects and toxicities, given narrow therapeutic window and significant risk of AE.        Monitor serum chemistries daily, strict intake and output Qshift , daily weights if able.  Renal protective measures: Please adjust medications for reduced clearance  Maintain MAP > 65  Transfuse for Hb <7    "

## 2025-03-18 NOTE — ASSESSMENT & PLAN NOTE
Lab Results   Component Value Date    HGBA1C 8.6 (H) 01/27/2025    HGBA1C 8.1 (H) 12/07/2024    HGBA1C 7.4 (H) 11/19/2024     Recent Labs     03/17/25  0510 03/17/25  0737 03/17/25  1623 03/17/25  2116 03/18/25  0515 03/18/25  0748 03/18/25  1204   POCTGLUCOSE  --    < >  --    < >  --    < > 193*   *  --  133*  --  124*  --   --     < > = values in this interval not displayed.     Antihyperglycemics (From admission, onward)      Start     Stop Route Frequency Ordered    03/17/25 1130  insulin aspart U-100 pen 5 Units         -- SubQ 3 times daily with meals 03/17/25 1020    03/16/25 0900  empagliflozin (Jardiance) tablet 10 mg        Question Answer Comment   Does this patient have a diagnosis of heart failure? Yes    Does this patient have type 1 diabetes or diabetic ketoacidosis? No    Does this patient have symptomatic hypotension? No    Is the patient NPO or pending major surgery in next 3 days or less? No        -- Oral Daily 03/16/25 0447    03/16/25 0700  insulin glargine U-100 (Lantus) pen 15 Units         -- SubQ Every morning 03/16/25 0517    03/16/25 0549  insulin aspart U-100 pen 0-5 Units         -- SubQ Before meals & nightly PRN 03/16/25 0450        Basal/prandial/SSI titrated PRN goal Glc 140-180  Continue home gabapentin for neuropathy  Hypoglycemia protocol  Hold PO antiglycemics while hospitalized  Diet diabetic

## 2025-03-18 NOTE — ASSESSMENT & PLAN NOTE
Hyponatremia is likely due to renal insufficiency. The patient's most recent sodium results are listed below.  Recent Labs     03/17/25  0510 03/17/25  1623 03/18/25  0515   * 133* 134*     Plan  - Correct the sodium by 4-6mEq in 24 hours.   - Monitor sodium Daily.   - Patient hyponatremia is improving

## 2025-03-18 NOTE — ASSESSMENT & PLAN NOTE
Most recent BP: (!) 169/71 (03/18/25 1203); 24 hr range BP  Min: 129/63  Max: 181/82  Current meds: doxazosin tablet 4 mg, Daily, Oral  hydrALAZINE tablet 100 mg, Every 8 hours, Oral  valsartan tablet 320 mg, Daily, Oral  Adjust regimen PRN goal SBP <180  Resume ARB  Hold diuretic in s/o PHUONG

## 2025-03-18 NOTE — ASSESSMENT & PLAN NOTE
Patient has a diagnosis of pneumonia. The cause of the pneumonia is suspected to be bacterial in etiology but organism is not known. The pneumonia is stable. The patient has the following signs/symptoms of pneumonia: cough and sputum production. The patient does not have a current oxygen requirement and the patient does not have a home oxygen requirement. I have reviewed the pertinent imaging. The following cultures have been collected: Blood cultures and Sputum culture The culture results are listed below.     Current antimicrobial regimen consists of the antibiotics listed below. Will monitor patient closely and Adjust treatment plan as follows      Antibiotics (From admission, onward)      Start     Stop Route Frequency Ordered    03/17/25 1315  levoFLOXacin tablet 750 mg         03/23/25 0859 Oral Every other day 03/17/25 1303          Microbiology Results (last 7 days)       Procedure Component Value Units Date/Time    Clostridium difficile EIA [[id number]]     Order Status: No result Specimen: Stool     Blood culture x two cultures. Draw prior to antibiotics. [[id number]] Collected: 03/16/25 0139    Order Status: Completed Specimen: Blood from Peripheral, Upper Arm, Left Updated: 03/18/25 0614     Blood Culture, Routine No Growth to date      No Growth to date      No Growth to date    Narrative:      Aerobic and anaerobic    Blood culture x two cultures. Draw prior to antibiotics. [[id number]] Collected: 03/16/25 0217    Order Status: Completed Specimen: Blood from Peripheral, Hand, Right Updated: 03/18/25 0614     Blood Culture, Routine No Growth to date      No Growth to date      No Growth to date    Narrative:      Aerobic and anaerobic    MRSA Screen by PCR [[id number]] Collected: 03/16/25 2019    Order Status: Completed Specimen: Nasal Swab Updated: 03/16/25 2228     MRSA SCREEN BY PCR Not Detected    Respiratory Infection Panel (PCR), Nasopharyngeal [[id number]] Collected: 03/16/25 1211    Order  Status: Completed Specimen: Nasopharyngeal Swab Updated: 03/16/25 1344     Respiratory Infection Panel Source NP Swab     Adenovirus Not Detected     Coronavirus 229E, Common Cold Virus Not Detected     Coronavirus HKU1, Common Cold Virus Not Detected     Coronavirus NL63, Common Cold Virus Not Detected     Coronavirus OC43, Common Cold Virus Not Detected     Comment: The Coronavirus strains detected in this test cause the common cold.  These strains are not the COVID-19 (novel Coronavirus)strain   associated with the respiratory disease outbreak.          SARS-CoV2 (COVID-19) Qualitative PCR Not Detected     Human Metapneumovirus Not Detected     Human Rhinovirus/Enterovirus Not Detected     Influenza A Not Detected     Influenza B Not Detected     Parainfluenza Virus 1 Not Detected     Parainfluenza Virus 2 Not Detected     Parainfluenza Virus 3 Not Detected     Parainfluenza Virus 4 Not Detected     Respiratory Syncytial Virus Not Detected     Bordetella Parapertussis (EP1141) Not Detected     Bordetella pertussis (ptxP) Not Detected     Chlamydia pneumoniae Not Detected     Mycoplasma pneumoniae Not Detected    Narrative:      Assay not valid for lower respiratory specimens, alternate  testing required.    Culture, Respiratory with Gram Stain [[id number]]     Order Status: No result Specimen: Respiratory     Influenza A & B by Molecular [[id number]] Collected: 03/16/25 0142    Order Status: Completed Specimen: Nasopharyngeal Swab Updated: 03/16/25 0222     Influenza A, Molecular Negative     Influenza B, Molecular Negative     Flu A & B Source Nasal swab        COVID, flu -ve  RUL consolidation on CXR  MRSA PCR -ve; d/c'd vanc  Rcx, RIP -ve  Continue BSA (vanc, pip-tazo); add azithro if clinically worsens  Transitioned to PO levofloxacin 750 mg QOD (renally dosed) to complete 7 day course (EED 03/22)

## 2025-03-18 NOTE — PROGRESS NOTES
[facility] - Internal Medicine Telemetry  Kidney Transplant  Progress Note      Reason for Follow-up: Reassessment of Kidney Transplant - 11/5/2016  (#1) recipient and management of immunosuppression.     ORGAN:   RIGHT KIDNEY    Donor Type:   Donation after Brain Death          Subjective:   History of Present Illness:  Referring Physician: Alexi Glasgow  Current Nephrologist: Angélica Munoz    ORGAN: RIGHT KIDNEY  Donor Type: donation after brain death  PHS Increased Risk: yes  Cold Ischemia: 314 mins  Induction Medications: thymoglobulin    Renal Allograft function: CKD stage 4. Functioning well.  Good urine output.  Baseline Creatinine of allograft: 2.3-2.6  Creatinine at time of consult: 2.4  Tests done: UA (3+ protein, 4+ glucose), UPCR 5.57 in 12/24.       SUBJECTIVE   [place]  HPI: 68 yo M with extensive history notable for kidney transplant (2016) on immunosuppression with CKD 3-4, chronic combined CHF (FELICIANO 2/3/25 EF 40-45%, CVA right MCA 12/24, atrial fib/flutter s/p DCCV 02/03/2025 who presents to the ED for a 102F fever at home. He reports a wet cough that started the night of his admission. He was recently hospitalized for syncope last month and discharged on a 30 day cardiac monitor.        Na 131, K 2.8, BUN 39, Cr 2.5 (baseline). At 3:13AM,   CXR was revealing for RUL consolidation concerning for hospital acquired pneumonia given his recent hospitalization. He was admitted for pneumonia and placed on broad spectrum antibiotics.       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 2 day de los santos, KDPI 85%, induction through thymoglobulin, PRA range 24%)    Was on PD for 1 and half years, with access peritoneal catheter.    EF on last  "echo was 50-55% and PAS 30 mmHg    Donor/Recipient: CMV ++ , HCV ++    Donor was also +RPR    Tx Surgery:  Tx surgery was done at [facility]. Had de los santos for 3 days, 1 drain place, Stent were required.      BK virus infection screening:   No results found for: "BKVIRUSDNAUR", "BKQUANTURINE", "BKVIRUSLOG", "BKVIRUSURINE"      Lab Results   Component Value Date    MICALBCREAT Unable to calculate 12/06/2024    CREATININE 2.4 (H) 03/17/2025         Hospital Course:  No notes on file    Interval History:  He has soft stools since yesterday. 3 episode yesterday and 2 this morning. He is complaining of SOB when he lays in R lateral position. No cough or fever.     Past Medical, Surgical, Family, and Social History:   Unchanged from H&P.    Scheduled Meds:   atorvastatin  80 mg Oral Daily    doxazosin  4 mg Oral Daily    empagliflozin  10 mg Oral Daily    ergocalciferol  50,000 Units Oral Q7 Days    gabapentin  100 mg Oral TID    hydrALAZINE  100 mg Oral Q8H    insulin aspart U-100  5 Units Subcutaneous TIDWM    insulin glargine U-100 (Lantus)  15 Units Subcutaneous QAM    Lactobacillus rhamnosus GG  1 capsule Oral Daily    levoFLOXacin  750 mg Oral Every other day    potassium chloride  40 mEq Oral Q2H    predniSONE  5 mg Oral Daily    rivaroxaban  15 mg Oral Daily with dinner    tacrolimus  1 mg Oral BID    valsartan  320 mg Oral Daily     Continuous Infusions:  PRN Meds:  Current Facility-Administered Medications:     acetaminophen, 650 mg, Oral, Q6H PRN    dextrose 50%, 12.5 g, Intravenous, PRN    dextrose 50%, 25 g, Intravenous, PRN    glucagon (human recombinant), 1 mg, Intramuscular, PRN    glucose, 16 g, Oral, PRN    glucose, 24 g, Oral, PRN    insulin aspart U-100, 0-5 Units, Subcutaneous, QID (AC + HS) PRN    loperamide, 2 mg, Oral, QID PRN    melatonin, 6 mg, Oral, Nightly PRN    naloxone, 0.02 mg, Intravenous, PRN    sodium chloride 0.9%, 10 mL, Intravenous, Q12H PRN    Intake/Output - Last 3 Shifts         03/16 " 0700  03/17 0659 03/17 0700  03/18 0659 03/18 0700 03/19 0659    P.O. 360 960 355    I.V. (mL/kg)  47.4 (0.5)     IV Piggyback 296.9 91     Total Intake(mL/kg) 656.9 (7.2) 1098.4 (12) 355 (3.9)    Urine (mL/kg/hr) 1100 (0.5) 700 (0.3)     Emesis/NG output 0 0     Stool 0 0     Total Output 1100 700     Net -443.1 +398.4 +355           Urine Occurrence  4 x 1 x    Stool Occurrence  2 x 2 x             Review of Systems   Objective:     Vital Signs (Most Recent):  Temp: 98.8 °F (37.1 °C) (03/18/25 0746)  Pulse: 75 (03/18/25 0815)  Resp: 18 (03/18/25 0746)  BP: (!) 157/90 (03/18/25 0815)  SpO2: 97 % (03/18/25 0746) Vital Signs (24h Range):  Temp:  [98.3 °F (36.8 °C)-99 °F (37.2 °C)] 98.8 °F (37.1 °C)  Pulse:  [] 75  Resp:  [17-18] 18  SpO2:  [96 %-99 %] 97 %  BP: (129-181)/(63-90) 157/90     Weight: 91.6 kg (201 lb 15.1 oz)  Height: 6' (182.9 cm)  Body mass index is 27.39 kg/m².     Physical Exam  HENT:      Head: Normocephalic and atraumatic.      Mouth/Throat:      Mouth: Mucous membranes are moist.   Cardiovascular:      Rate and Rhythm: Normal rate.   Pulmonary:      Effort: Pulmonary effort is normal.   Abdominal:      General: Abdomen is flat.      Palpations: Abdomen is soft.   Musculoskeletal:      Right lower leg: No edema.      Left lower leg: No edema.   Neurological:      Mental Status: He is alert.          Laboratory:  CBC:   Recent Labs   Lab 03/16/25  0140 03/16/25  0151 03/17/25  0510 03/18/25  0515   WBC 13.98*  --  11.82 7.33   RBC 4.29*  --  3.92* 3.81*   HGB 9.9*  --  8.8* 8.5*   HCT 32.7* 31.5 29.1* 28.2*     --  136* 120*   MCV 76*  --  74* 74*   MCH 23.1*  --  22.4* 22.3*   MCHC 30.3*  --  30.2* 30.1*     BMP:   Recent Labs   Lab 03/17/25  0510 03/17/25  1623 03/18/25  0515   * 133* 124*   * 133* 134*   K 3.2* 3.5 3.4*   CL 98 98 100   CO2 22* 24 22*   BUN 38* 39* 37*   CREATININE 2.4* 2.5* 2.5*   CALCIUM 7.8* 7.9* 8.2*     Labs within the past 24 hours have been  "reviewed.  Assessment/Plan:     S/P cadaveric kidney transplant 11/5/2016. ESRD secondary to HTN/DMII  *Diarrhea:  Likely due to medications - RO infection  Consider Imodium if required    Status post kidney transplant - CKD 3b/4   Baseline Creatinine: 2.3-2.6  Creatinine at time of consult: 2.4  Tests done: UA (3+ protein, 4+ glucose) , UPCR 5.57 in 12/24 ,     Long term current use of immunosuppressive drug  - Home:  BID, Pred 5, Tac 0.5 QD  - Current: Tac 0.5 QID  - Increased Tacrolimus to 1/1  - Daily Tacrolimus level , 12 hours after the dose  - Continue to suspend the MMF. Resume once the infection revolves and antibiotics are not required.     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   - He remains in immunosuppressed state, at risk of infections and, rejection, and toxicity.    - Monitor for side effects and toxicities, given narrow therapeutic window and significant risk of AE.        Monitor serum chemistries daily, strict intake and output Qshift , daily weights if able.  Renal protective measures: Please adjust medications for reduced clearance  Maintain MAP > 65  Transfuse for Hb <7        Due to above, pt is at high risk of mortality.    Harpreet Castano MD  Kidney Transplant  [facility] - Internal Medicine Telemetry    "

## 2025-03-18 NOTE — PROGRESS NOTES
[facility] - Internal Medicine [facility] Medicine  Progress Note    Patient Name: Ravi Zamorano  MRN: [id number]  Patient Class: IP- Inpatient   Admission Date: 3/16/2025  Length of Stay: 2 days  Attending Physician: Sandra Kern*  Primary Care Provider: Mima Mack MD        Subjective     Principal Problem:Hospital-acquired pneumonia        HPI:  70 yo M with extensive history notable for kidney transplant (2016) on immunosuppression with CKD, chronic combined CHF (FELICIANO 2/3/25 EF 40-45%, TTE 12/8/24 G3DD), CVA right MCA 12/24 with minimal residual LLE and left hand weakness, DM2, atrial fib/flutter s/p DCCV 02/03/2025 who presents to the ED for a 102F fever at home. He reports a wet cough that started the night of his admission. He was recently hospitalized for syncope last month and discharged on a 30 day cardiac monitor. Beta blocker held then due to iatrogenic bradycardia to the 20s. He is on MMF, Tacrolimus and Prednisone. He denies dysuria or suprapubic pain. He does not have any open wounds.     In the ED, he was afebrile, HR 70, /70. Labs remarkable for WBC 14, Hg 10 (baseline), Na 131, K 2.8, BUN 39, Cr 2.5 (baseline). At 3:13AM, he had an 18 beat run of Vtach before he had electrolyte repletion. He reported palpitations during the event that resolved. He denied chest pain, abdominal pain, shortness of breath and nausea.  CXR was revealing for RUL consolidation concerning for hospital acquired pneumonia given his recent hospitalization. He was admitted for pneumonia and placed on broad spectrum antibiotics.     Overview/Hospital Course:  Admitted for RUL HAP. KTM consulted for tacro dosing as well as PHUONG; resumed prednisone & held MMF. Started on empiric vanc & pip-tazo. MRSA -ve; d/c'd vanc. Clinically improved; transitioned pip-tazo to PO levofloxacin (renally dosed) to complete 7 day course (EED 03/22). Imodium and probiotic added for watery stools. Patient stable for  discharge on PO abx. Recommend close f/u with PCP and tx nephrologist.     Interval History: NAEON; clinically improved; Continue levofloxacin (EED 03/22). KTM following; adjusting tacro dose; resumed pred, holding MMF. HTN, resumed ARB, more controlled today. Patient complaining fo a few episodes of non-bloody diarrhea. Probiotic and imodium added. Will continue to monitor. Plan for d/c later today if improved.    Review of Systems  Objective:     Vital Signs (Most Recent):  Temp: 98.3 °F (36.8 °C) (03/17/25 1137)  Pulse: 100 (03/17/25 1137)  Resp: 17 (03/17/25 1137)  BP: (!) 158/82 (03/17/25 1137)  SpO2: 97 % (03/17/25 1137) Vital Signs (24h Range):  Temp:  [98 °F (36.7 °C)-99.6 °F (37.6 °C)] 98.3 °F (36.8 °C)  Pulse:  [] 100  Resp:  [17-20] 17  SpO2:  [95 %-99 %] 97 %  BP: (150-188)/(70-93) 158/82     Weight: 91.6 kg (201 lb 15.1 oz)  Body mass index is 27.39 kg/m².    Intake/Output Summary (Last 24 hours) at 3/17/2025 1312  Last data filed at 3/17/2025 0745  Gross per 24 hour   Intake 596.46 ml   Output 700 ml   Net -103.54 ml         Physical Exam  Constitutional:       General: He is not in acute distress.  HENT:      Head: Normocephalic and atraumatic.   Eyes:      Extraocular Movements: Extraocular movements intact.   Cardiovascular:      Rate and Rhythm: Normal rate and regular rhythm.   Pulmonary:      Effort: Pulmonary effort is normal. No respiratory distress.      Breath sounds: Rales present. No wheezing.      Comments: Faint RUL rales  Abdominal:      General: There is distension.      Palpations: Abdomen is soft.      Tenderness: There is no abdominal tenderness. There is no guarding or rebound.   Musculoskeletal:      Right lower leg: No edema.      Left lower leg: No edema.   Skin:     General: Skin is warm and dry.   Neurological:      Mental Status: He is alert. Mental status is at baseline.   Psychiatric:         Mood and Affect: Mood normal.         Behavior: Behavior normal.                Significant Labs: All pertinent labs within the past 24 hours have been reviewed.    Significant Imaging: I have reviewed all pertinent imaging results/findings within the past 24 hours.      Assessment & Plan  Hospital-acquired pneumonia  Patient has a diagnosis of pneumonia. The cause of the pneumonia is suspected to be bacterial in etiology but organism is not known. The pneumonia is stable. The patient has the following signs/symptoms of pneumonia: cough and sputum production. The patient does not have a current oxygen requirement and the patient does not have a home oxygen requirement. I have reviewed the pertinent imaging. The following cultures have been collected: Blood cultures and Sputum culture The culture results are listed below.     Current antimicrobial regimen consists of the antibiotics listed below. Will monitor patient closely and Adjust treatment plan as follows      Antibiotics (From admission, onward)      Start     Stop Route Frequency Ordered    03/17/25 1315  levoFLOXacin tablet 750 mg         03/23/25 0859 Oral Every other day 03/17/25 1303          Microbiology Results (last 7 days)       Procedure Component Value Units Date/Time    Clostridium difficile EIA [[id number]]     Order Status: No result Specimen: Stool     Blood culture x two cultures. Draw prior to antibiotics. [[id number]] Collected: 03/16/25 0139    Order Status: Completed Specimen: Blood from Peripheral, Upper Arm, Left Updated: 03/18/25 0614     Blood Culture, Routine No Growth to date      No Growth to date      No Growth to date    Narrative:      Aerobic and anaerobic    Blood culture x two cultures. Draw prior to antibiotics. [[id number]] Collected: 03/16/25 0217    Order Status: Completed Specimen: Blood from Peripheral, Hand, Right Updated: 03/18/25 0614     Blood Culture, Routine No Growth to date      No Growth to date      No Growth to date    Narrative:      Aerobic and anaerobic    MRSA Screen by PCR  [[id number]] Collected: 03/16/25 2019    Order Status: Completed Specimen: Nasal Swab Updated: 03/16/25 2228     MRSA SCREEN BY PCR Not Detected    Respiratory Infection Panel (PCR), Nasopharyngeal [[id number]] Collected: 03/16/25 1211    Order Status: Completed Specimen: Nasopharyngeal Swab Updated: 03/16/25 1344     Respiratory Infection Panel Source NP Swab     Adenovirus Not Detected     Coronavirus 229E, Common Cold Virus Not Detected     Coronavirus HKU1, Common Cold Virus Not Detected     Coronavirus NL63, Common Cold Virus Not Detected     Coronavirus OC43, Common Cold Virus Not Detected     Comment: The Coronavirus strains detected in this test cause the common cold.  These strains are not the COVID-19 (novel Coronavirus)strain   associated with the respiratory disease outbreak.          SARS-CoV2 (COVID-19) Qualitative PCR Not Detected     Human Metapneumovirus Not Detected     Human Rhinovirus/Enterovirus Not Detected     Influenza A Not Detected     Influenza B Not Detected     Parainfluenza Virus 1 Not Detected     Parainfluenza Virus 2 Not Detected     Parainfluenza Virus 3 Not Detected     Parainfluenza Virus 4 Not Detected     Respiratory Syncytial Virus Not Detected     Bordetella Parapertussis (SW3836) Not Detected     Bordetella pertussis (ptxP) Not Detected     Chlamydia pneumoniae Not Detected     Mycoplasma pneumoniae Not Detected    Narrative:      Assay not valid for lower respiratory specimens, alternate  testing required.    Culture, Respiratory with Gram Stain [[id number]]     Order Status: No result Specimen: Respiratory     Influenza A & B by Molecular [[id number]] Collected: 03/16/25 0142    Order Status: Completed Specimen: Nasopharyngeal Swab Updated: 03/16/25 0222     Influenza A, Molecular Negative     Influenza B, Molecular Negative     Flu A & B Source Nasal swab        COVID, flu -ve  RUL consolidation on CXR  MRSA PCR -ve; d/c'd vanc  Rcx, RIP -ve  Continue BSA (vanc, pip-tazo);  add azithro if clinically worsens  Transitioned to PO levofloxacin 750 mg QOD (renally dosed) to complete 7 day course (EED 03/22)  Type 2 diabetes mellitus with stage 3a chronic kidney disease, with long-term current use of insulin  Diabetic polyneuropathy associated with type 2 diabetes mellitus  Lab Results   Component Value Date    HGBA1C 8.6 (H) 01/27/2025    HGBA1C 8.1 (H) 12/07/2024    HGBA1C 7.4 (H) 11/19/2024     Recent Labs     03/17/25  0510 03/17/25  0737 03/17/25  1623 03/17/25  2116 03/18/25  0515 03/18/25  0748 03/18/25  1204   POCTGLUCOSE  --    < >  --    < >  --    < > 193*   *  --  133*  --  124*  --   --     < > = values in this interval not displayed.     Antihyperglycemics (From admission, onward)      Start     Stop Route Frequency Ordered    03/17/25 1130  insulin aspart U-100 pen 5 Units         -- SubQ 3 times daily with meals 03/17/25 1020    03/16/25 0900  empagliflozin (Jardiance) tablet 10 mg        Question Answer Comment   Does this patient have a diagnosis of heart failure? Yes    Does this patient have type 1 diabetes or diabetic ketoacidosis? No    Does this patient have symptomatic hypotension? No    Is the patient NPO or pending major surgery in next 3 days or less? No        -- Oral Daily 03/16/25 0447    03/16/25 0700  insulin glargine U-100 (Lantus) pen 15 Units         -- SubQ Every morning 03/16/25 0517    03/16/25 0549  insulin aspart U-100 pen 0-5 Units         -- SubQ Before meals & nightly PRN 03/16/25 0450        Basal/prandial/SSI titrated PRN goal Glc 140-180  Continue home gabapentin for neuropathy  Hypoglycemia protocol  Hold PO antiglycemics while hospitalized  Diet diabetic   Essential hypertension  Most recent BP: (!) 169/71 (03/18/25 1203); 24 hr range BP  Min: 129/63  Max: 181/82  Current meds: doxazosin tablet 4 mg, Daily, Oral  hydrALAZINE tablet 100 mg, Every 8 hours, Oral  valsartan tablet 320 mg, Daily, Oral  Adjust regimen PRN goal SBP <180  Resume  ARB  Hold diuretic in s/o PHUONG   Hyperlipidemia  Continue statin  Persistent atrial fibrillation  Patient has paroxysmal (<7 days) atrial fibrillation. Patient is currently in sinus rhythm. [id number] Score: 3. The patients heart rate in the last 24 hours is as follows: Pulse  Min: 72  Max: 102  Antiarrhythmics:    Anticoagulants: rivaroxaban tablet 15 mg, With dinner, Oral  Replete lytes with a goal of K>4, Mg >2  Patient is anticoagulated, see medications listed above.  Patient's afib is currently controlled  Elevated troponin  Elevated troponin on admission with no chest pressure/pain, sob, abdominal pain, nausea. It could be related to his underlying infection. Continue to monitor for symptoms. No indication for ACS at this time. ECG w/ some V5-6 changes; ischemia?  Repeat ECG  Cardiac tele  Replace K & Mg PRN  Consider Cards consult  History of CVA (cerebrovascular accident)  Stable  On DOAC & statin; not on DAPT  Wide-complex tachycardia  18 beats of monomorphic, wide complex tachycardia noted on 3/16/25 around 3AM  Repeat ECG  Cardiac tele  Replace K & Mg PRN  Consider Cards consult  S/P cadaveric kidney transplant 11/5/2016. ESRD secondary to HTN/DMII  Immunosuppressed status  History of kidney transplant on immunosuppression  KTM consulted  Hold MMF in s/o infxn  Titrate tacro dose per KTM  Resume prednisone  Daily tacro level  Hyponatremia  Hyponatremia is likely due to renal insufficiency. The patient's most recent sodium results are listed below.  Recent Labs     03/17/25  0510 03/17/25  1623 03/18/25  0515   * 133* 134*     Plan  - Correct the sodium by 4-6mEq in 24 hours.   - Monitor sodium Daily.   - Patient hyponatremia is improving    Hypokalemia  Patient's most recent potassium results are listed below.   Recent Labs     03/17/25  0510 03/17/25  1623 03/18/25  0515   K 3.2* 3.5 3.4*     Plan  - Replete potassium per protocol  - Monitor potassium Daily  - Patient's hypokalemia is stable    VTE  Risk Mitigation (From admission, onward)           Ordered     rivaroxaban tablet 15 mg  With dinner         03/16/25 0447     IP VTE HIGH RISK PATIENT  Once         03/16/25 0447     Place sequential compression device  Until discontinued         03/16/25 0447                    Discharge Planning   UBALDO: 3/18/2025     Code Status: Full Code   Medical Readiness for Discharge Date:   Discharge Plan A: Home with family, Home Health                        Sandra Cook MD  Department of Hospital Medicine   [facility] - Internal Medicine Telemetry

## 2025-03-18 NOTE — ASSESSMENT & PLAN NOTE
Patient has paroxysmal (<7 days) atrial fibrillation. Patient is currently in sinus rhythm. [id number] Score: 3. The patients heart rate in the last 24 hours is as follows: Pulse  Min: 72  Max: 102  Antiarrhythmics:    Anticoagulants: rivaroxaban tablet 15 mg, With dinner, Oral  Replete lytes with a goal of K>4, Mg >2  Patient is anticoagulated, see medications listed above.  Patient's afib is currently controlled

## 2025-03-18 NOTE — SUBJECTIVE & OBJECTIVE
"  Subjective:   History of Present Illness:  Referring Physician: Alexi Glasgow  Current Nephrologist: Angélica Munoz    ORGAN: RIGHT KIDNEY  Donor Type: donation after brain death  PHS Increased Risk: yes  Cold Ischemia: 314 mins  Induction Medications: thymoglobulin    Renal Allograft function: CKD stage 4. Functioning well.  Good urine output.  Baseline Creatinine of allograft: 2.3-2.6  Creatinine at time of consult: 2.4  Tests done: UA (3+ protein, 4+ glucose), UPCR 5.57 in 12/24.       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 He reports a wet cough that started the night of his admission. He was recently hospitalized for syncope last month and discharged on a 30 day cardiac monitor.        Na 131, K 2.8, BUN 39, Cr 2.5 (baseline). At 3:13AM,   CXR was revealing for RUL consolidation concerning for hospital acquired pneumonia given his recent hospitalization. He was admitted for pneumonia and placed on broad spectrum antibiotics.       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 2 day de los santos, KDPI 85%, induction through thymoglobulin, PRA range 24%)    Was on PD for 1 and half years, with access peritoneal catheter.    EF on last echo was 50-55% and PAS 30 mmHg    Donor/Recipient: CMV ++ , HCV ++    Donor was also +RPR    Tx Surgery:  Tx surgery was done at [facility] [place]. Had de los santos for 3 days, 1 drain place, Stent were required.      BK virus infection screening:   No results found for: "BKVIRUSDNAUR", " ""BKQUANTURINE", "BKVIRUSLOG", "BKVIRUSURINE"      Lab Results   Component Value Date    MICALBCREAT Unable to calculate 12/06/2024    CREATININE 2.4 (H) 03/17/2025         Hospital Course:  No notes on file    Interval History:  He has soft stools since yesterday. 3 episode yesterday and 2 this morning. He is complaining of SOB when he lays in R lateral position. No cough or fever.     Past Medical, Surgical, Family, and Social History:   Unchanged from H&P.    Scheduled Meds:   atorvastatin  80 mg Oral Daily    doxazosin  4 mg Oral Daily    empagliflozin  10 mg Oral Daily    ergocalciferol  50,000 Units Oral Q7 Days    gabapentin  100 mg Oral TID    hydrALAZINE  100 mg Oral Q8H    insulin aspart U-100  5 Units Subcutaneous TIDWM    insulin glargine U-100 (Lantus)  15 Units Subcutaneous QAM    Lactobacillus rhamnosus GG  1 capsule Oral Daily    levoFLOXacin  750 mg Oral Every other day    potassium chloride  40 mEq Oral Q2H    predniSONE  5 mg Oral Daily    rivaroxaban  15 mg Oral Daily with dinner    tacrolimus  1 mg Oral BID    valsartan  320 mg Oral Daily     Continuous Infusions:  PRN Meds:  Current Facility-Administered Medications:     acetaminophen, 650 mg, Oral, Q6H PRN    dextrose 50%, 12.5 g, Intravenous, PRN    dextrose 50%, 25 g, Intravenous, PRN    glucagon (human recombinant), 1 mg, Intramuscular, PRN    glucose, 16 g, Oral, PRN    glucose, 24 g, Oral, PRN    insulin aspart U-100, 0-5 Units, Subcutaneous, QID (AC + HS) PRN    loperamide, 2 mg, Oral, QID PRN    melatonin, 6 mg, Oral, Nightly PRN    naloxone, 0.02 mg, Intravenous, PRN    sodium chloride 0.9%, 10 mL, Intravenous, Q12H PRN    Intake/Output - Last 3 Shifts         03/16 0700  03/17 0659 03/17 0700 03/18 0659 03/18 0700 03/19 0659    P.O. 360 960 355    I.V. (mL/kg)  47.4 (0.5)     IV Piggyback 296.9 91     Total Intake(mL/kg) 656.9 (7.2) 1098.4 (12) 355 (3.9)    Urine (mL/kg/hr) 1100 (0.5) 700 (0.3)     Emesis/NG output 0 0     Stool 0 0  "    Total Output 1100 700     Net -443.1 +398.4 +355           Urine Occurrence  4 x 1 x    Stool Occurrence  2 x 2 x             Review of Systems   Objective:     Vital Signs (Most Recent):  Temp: 98.8 °F (37.1 °C) (03/18/25 0746)  Pulse: 75 (03/18/25 0815)  Resp: 18 (03/18/25 0746)  BP: (!) 157/90 (03/18/25 0815)  SpO2: 97 % (03/18/25 0746) Vital Signs (24h Range):  Temp:  [98.3 °F (36.8 °C)-99 °F (37.2 °C)] 98.8 °F (37.1 °C)  Pulse:  [] 75  Resp:  [17-18] 18  SpO2:  [96 %-99 %] 97 %  BP: (129-181)/(63-90) 157/90     Weight: 91.6 kg (201 lb 15.1 oz)  Height: 6' (182.9 cm)  Body mass index is 27.39 kg/m².     Physical Exam  HENT:      Head: Normocephalic and atraumatic.      Mouth/Throat:      Mouth: Mucous membranes are moist.   Cardiovascular:      Rate and Rhythm: Normal rate.   Pulmonary:      Effort: Pulmonary effort is normal.   Abdominal:      General: Abdomen is flat.      Palpations: Abdomen is soft.   Musculoskeletal:      Right lower leg: No edema.      Left lower leg: No edema.   Neurological:      Mental Status: He is alert.          Laboratory:  CBC:   Recent Labs   Lab 03/16/25  0140 03/16/25  0151 03/17/25  0510 03/18/25  0515   WBC 13.98*  --  11.82 7.33   RBC 4.29*  --  3.92* 3.81*   HGB 9.9*  --  8.8* 8.5*   HCT 32.7* 31.5 29.1* 28.2*     --  136* 120*   MCV 76*  --  74* 74*   MCH 23.1*  --  22.4* 22.3*   MCHC 30.3*  --  30.2* 30.1*     BMP:   Recent Labs   Lab 03/17/25  0510 03/17/25  1623 03/18/25  0515   * 133* 124*   * 133* 134*   K 3.2* 3.5 3.4*   CL 98 98 100   CO2 22* 24 22*   BUN 38* 39* 37*   CREATININE 2.4* 2.5* 2.5*   CALCIUM 7.8* 7.9* 8.2*     Labs within the past 24 hours have been reviewed.

## 2025-03-18 NOTE — PLAN OF CARE
Problem: Adult Inpatient Plan of Care  Goal: Plan of Care Review  Outcome: Not Progressing  Goal: Optimal Comfort and Wellbeing  Outcome: Not Progressing     Problem: Pneumonia  Goal: Fluid Balance  Outcome: Progressing

## 2025-03-19 VITALS
OXYGEN SATURATION: 99 % | SYSTOLIC BLOOD PRESSURE: 168 MMHG | TEMPERATURE: 98 F | HEIGHT: 72 IN | RESPIRATION RATE: 16 BRPM | BODY MASS INDEX: 27.35 KG/M2 | DIASTOLIC BLOOD PRESSURE: 72 MMHG | HEART RATE: 76 BPM | WEIGHT: 201.94 LBS

## 2025-03-19 LAB
ALBUMIN SERPL BCP-MCNC: 2.5 G/DL (ref 3.5–5.2)
ALP SERPL-CCNC: 42 U/L (ref 40–150)
ALT SERPL W/O P-5'-P-CCNC: <5 U/L (ref 10–44)
ANION GAP SERPL CALC-SCNC: 12 MMOL/L (ref 8–16)
AST SERPL-CCNC: 11 U/L (ref 10–40)
BASOPHILS # BLD AUTO: 0.01 K/UL (ref 0–0.2)
BASOPHILS NFR BLD: 0.2 % (ref 0–1.9)
BILIRUB SERPL-MCNC: 0.4 MG/DL (ref 0.1–1)
BUN SERPL-MCNC: 34 MG/DL (ref 8–23)
CALCIUM SERPL-MCNC: 8 MG/DL (ref 8.7–10.5)
CHLORIDE SERPL-SCNC: 102 MMOL/L (ref 95–110)
CO2 SERPL-SCNC: 19 MMOL/L (ref 23–29)
CREAT SERPL-MCNC: 2.2 MG/DL (ref 0.5–1.4)
DIFFERENTIAL METHOD BLD: ABNORMAL
EOSINOPHIL # BLD AUTO: 0 K/UL (ref 0–0.5)
EOSINOPHIL NFR BLD: 0.9 % (ref 0–8)
ERYTHROCYTE [DISTWIDTH] IN BLOOD BY AUTOMATED COUNT: 16.3 % (ref 11.5–14.5)
EST. GFR  (NO RACE VARIABLE): 32 ML/MIN/1.73 M^2
GLUCOSE SERPL-MCNC: 129 MG/DL (ref 70–110)
HCT VFR BLD AUTO: 29.4 % (ref 40–54)
HGB BLD-MCNC: 8.7 G/DL (ref 14–18)
IMM GRANULOCYTES # BLD AUTO: 0.02 K/UL (ref 0–0.04)
IMM GRANULOCYTES NFR BLD AUTO: 0.4 % (ref 0–0.5)
LYMPHOCYTES # BLD AUTO: 0.4 K/UL (ref 1–4.8)
LYMPHOCYTES NFR BLD: 8 % (ref 18–48)
MAGNESIUM SERPL-MCNC: 2.2 MG/DL (ref 1.6–2.6)
MCH RBC QN AUTO: 22.3 PG (ref 27–31)
MCHC RBC AUTO-ENTMCNC: 29.6 G/DL (ref 32–36)
MCV RBC AUTO: 75 FL (ref 82–98)
MONOCYTES # BLD AUTO: 0.9 K/UL (ref 0.3–1)
MONOCYTES NFR BLD: 18.9 % (ref 4–15)
NEUTROPHILS # BLD AUTO: 3.3 K/UL (ref 1.8–7.7)
NEUTROPHILS NFR BLD: 71.6 % (ref 38–73)
NRBC BLD-RTO: 0 /100 WBC
PHOSPHATE SERPL-MCNC: 2.4 MG/DL (ref 2.7–4.5)
PLATELET # BLD AUTO: 129 K/UL (ref 150–450)
PMV BLD AUTO: ABNORMAL FL (ref 9.2–12.9)
POCT GLUCOSE: 109 MG/DL (ref 70–110)
POCT GLUCOSE: 154 MG/DL (ref 70–110)
POCT GLUCOSE: 173 MG/DL (ref 70–110)
POTASSIUM SERPL-SCNC: 3.6 MMOL/L (ref 3.5–5.1)
PROT SERPL-MCNC: 5.6 G/DL (ref 6–8.4)
RBC # BLD AUTO: 3.91 M/UL (ref 4.6–6.2)
SODIUM SERPL-SCNC: 133 MMOL/L (ref 136–145)
TACROLIMUS BLD-MCNC: 4.8 NG/ML (ref 5–15)
TACROLIMUS BLD-MCNC: 4.8 NG/ML (ref 5–15)
WBC # BLD AUTO: 4.61 K/UL (ref 3.9–12.7)

## 2025-03-19 PROCEDURE — 25000003 PHARM REV CODE 250: Mod: HCNC | Performed by: INTERNAL MEDICINE

## 2025-03-19 PROCEDURE — 63600175 PHARM REV CODE 636 W HCPCS: Mod: HCNC | Performed by: STUDENT IN AN ORGANIZED HEALTH CARE EDUCATION/TRAINING PROGRAM

## 2025-03-19 PROCEDURE — 80197 ASSAY OF TACROLIMUS: CPT | Mod: HCNC

## 2025-03-19 PROCEDURE — 85025 COMPLETE CBC W/AUTO DIFF WBC: CPT | Mod: HCNC

## 2025-03-19 PROCEDURE — 84100 ASSAY OF PHOSPHORUS: CPT | Mod: HCNC

## 2025-03-19 PROCEDURE — 25000003 PHARM REV CODE 250: Mod: HCNC

## 2025-03-19 PROCEDURE — 83735 ASSAY OF MAGNESIUM: CPT | Mod: HCNC

## 2025-03-19 PROCEDURE — 80053 COMPREHEN METABOLIC PANEL: CPT | Mod: HCNC

## 2025-03-19 PROCEDURE — 36415 COLL VENOUS BLD VENIPUNCTURE: CPT | Mod: HCNC

## 2025-03-19 RX ORDER — VANCOMYCIN HYDROCHLORIDE 125 MG/1
125 CAPSULE ORAL 4 TIMES DAILY
Qty: 40 CAPSULE | Refills: 0 | Status: SHIPPED | OUTPATIENT
Start: 2025-03-19 | End: 2025-03-29

## 2025-03-19 RX ORDER — VALSARTAN 320 MG/1
320 TABLET ORAL DAILY
Qty: 90 TABLET | Refills: 3 | Status: SHIPPED | OUTPATIENT
Start: 2025-03-20 | End: 2026-03-20

## 2025-03-19 RX ORDER — VALSARTAN 320 MG/1
320 TABLET ORAL DAILY
Qty: 90 TABLET | Refills: 3 | Status: SHIPPED | OUTPATIENT
Start: 2025-03-19 | End: 2025-03-19 | Stop reason: HOSPADM

## 2025-03-19 RX ORDER — LEVOFLOXACIN 750 MG/1
750 TABLET ORAL EVERY OTHER DAY
Qty: 1 TABLET | Refills: 0 | Status: SHIPPED | OUTPATIENT
Start: 2025-03-21 | End: 2025-04-03

## 2025-03-19 RX ORDER — TACROLIMUS 1 MG/1
1 CAPSULE ORAL EVERY 12 HOURS
Qty: 60 CAPSULE | Refills: 11 | Status: SHIPPED | OUTPATIENT
Start: 2025-03-19 | End: 2026-03-19

## 2025-03-19 RX ADMIN — HYDRALAZINE HYDROCHLORIDE 100 MG: 25 TABLET ORAL at 05:03

## 2025-03-19 RX ADMIN — ATORVASTATIN CALCIUM 80 MG: 40 TABLET, FILM COATED ORAL at 08:03

## 2025-03-19 RX ADMIN — TACROLIMUS 1 MG: 1 CAPSULE ORAL at 08:03

## 2025-03-19 RX ADMIN — HYDRALAZINE HYDROCHLORIDE 100 MG: 25 TABLET ORAL at 03:03

## 2025-03-19 RX ADMIN — INSULIN ASPART 5 UNITS: 100 INJECTION, SOLUTION INTRAVENOUS; SUBCUTANEOUS at 08:03

## 2025-03-19 RX ADMIN — VANCOMYCIN HYDROCHLORIDE 125 MG: KIT at 03:03

## 2025-03-19 RX ADMIN — GABAPENTIN 100 MG: 100 CAPSULE ORAL at 03:03

## 2025-03-19 RX ADMIN — Medication 1 CAPSULE: at 08:03

## 2025-03-19 RX ADMIN — VALSARTAN 320 MG: 160 TABLET, FILM COATED ORAL at 08:03

## 2025-03-19 RX ADMIN — LEVOFLOXACIN 750 MG: 750 TABLET, FILM COATED ORAL at 08:03

## 2025-03-19 RX ADMIN — INSULIN GLARGINE 15 UNITS: 100 INJECTION, SOLUTION SUBCUTANEOUS at 08:03

## 2025-03-19 RX ADMIN — DOXAZOSIN 4 MG: 2 TABLET ORAL at 08:03

## 2025-03-19 RX ADMIN — GABAPENTIN 100 MG: 100 CAPSULE ORAL at 08:03

## 2025-03-19 RX ADMIN — EMPAGLIFLOZIN 10 MG: 10 TABLET, FILM COATED ORAL at 08:03

## 2025-03-19 RX ADMIN — PREDNISONE 5 MG: 5 TABLET ORAL at 08:03

## 2025-03-19 NOTE — SUBJECTIVE & OBJECTIVE
Interval History: NAEON;  AF; VSS. BP elevated overnight- patient remains asymptomatic. Patient positive for Cdiff PCR and antitgen; toxins negative. No further episodes of diarrhea since yesterday. Kidney transplant monitoring tacro levels.      Review of Systems  Objective:     Vital Signs (Most Recent):  Temp: 98.8 °F (37.1 °C) (03/19/25 0448)  Pulse: 77 (03/19/25 0448)  Resp: 19 (03/19/25 0448)  BP: (!) 187/87 (03/19/25 0448)  SpO2: 96 % (03/19/25 0448) Vital Signs (24h Range):  Temp:  [98.2 °F (36.8 °C)-99.2 °F (37.3 °C)] 98.8 °F (37.1 °C)  Pulse:  [] 77  Resp:  [18-19] 19  SpO2:  [96 %-99 %] 96 %  BP: (152-191)/(71-90) 187/87     Weight: 91.6 kg (201 lb 15.1 oz)  Body mass index is 27.39 kg/m².    Intake/Output Summary (Last 24 hours) at 3/19/2025 0630  Last data filed at 3/18/2025 1300  Gross per 24 hour   Intake 595 ml   Output --   Net 595 ml         Physical Exam  Constitutional:       General: He is not in acute distress.  HENT:      Head: Normocephalic and atraumatic.   Eyes:      Extraocular Movements: Extraocular movements intact.   Cardiovascular:      Rate and Rhythm: Normal rate and regular rhythm.   Pulmonary:      Effort: Pulmonary effort is normal. No respiratory distress.      Breath sounds: Rales present. No wheezing.      Comments: Faint RUL rales  Abdominal:      General: There is distension.      Palpations: Abdomen is soft.      Tenderness: There is no abdominal tenderness. There is no guarding or rebound.   Musculoskeletal:      Right lower leg: No edema.      Left lower leg: No edema.   Skin:     General: Skin is warm and dry.   Neurological:      Mental Status: He is alert. Mental status is at baseline.   Psychiatric:         Mood and Affect: Mood normal.         Behavior: Behavior normal.               Significant Labs: All pertinent labs within the past 24 hours have been reviewed.    Significant Imaging: I have reviewed all pertinent imaging results/findings within the past 24  hours.

## 2025-03-19 NOTE — ASSESSMENT & PLAN NOTE
Lab Results   Component Value Date    HGBA1C 8.6 (H) 01/27/2025    HGBA1C 8.1 (H) 12/07/2024    HGBA1C 7.4 (H) 11/19/2024     Recent Labs     03/17/25  1623 03/17/25  2116 03/18/25  0515 03/18/25  0748 03/19/25  0228 03/19/25  0721   POCTGLUCOSE  --    < >  --    < >  --  154*   *  --  124*  --  129*  --     < > = values in this interval not displayed.     Antihyperglycemics (From admission, onward)      Start     Stop Route Frequency Ordered    03/17/25 1130  insulin aspart U-100 pen 5 Units         -- SubQ 3 times daily with meals 03/17/25 1020    03/16/25 0900  empagliflozin (Jardiance) tablet 10 mg        Question Answer Comment   Does this patient have a diagnosis of heart failure? Yes    Does this patient have type 1 diabetes or diabetic ketoacidosis? No    Does this patient have symptomatic hypotension? No    Is the patient NPO or pending major surgery in next 3 days or less? No        -- Oral Daily 03/16/25 0447    03/16/25 0700  insulin glargine U-100 (Lantus) pen 15 Units         -- SubQ Every morning 03/16/25 0517    03/16/25 0549  insulin aspart U-100 pen 0-5 Units         -- SubQ Before meals & nightly PRN 03/16/25 0450        Basal/prandial/SSI titrated PRN goal Glc 140-180  Continue home gabapentin for neuropathy  Hypoglycemia protocol  Hold PO antiglycemics while hospitalized  Diet diabetic

## 2025-03-19 NOTE — DISCHARGE SUMMARY
[facility] - Internal Medicine [facility]  Discharge Summary      Patient Name: Ravi Zamorano  MRN: [id number]  MARISOL: [id number]  Patient Class: IP- Inpatient  Admission Date: 3/16/2025  Hospital Length of Stay: 3 days  Discharge Date and Time:  03/19/2025 10:45 AM  Attending Physician: Sandra Kern*   Discharging Provider: Sandra Cook MD  Primary Care Provider: Mima Mack MD  Hospital Medicine Team: [facility] MED 3 Sandra Cook MD  Primary Care Team: [facility] 3    HPI:   70 yo M with extensive history notable for kidney transplant (2016) on immunosuppression with CKD, chronic combined CHF (FELICIANO 2/3/25 EF 40-45%, TTE 12/8/24 G3DD), CVA right MCA 12/24 with minimal residual LLE and left hand weakness, DM2, atrial fib/flutter s/p DCCV 02/03/2025 who presents to the ED for a 102F fever at home. He reports a wet cough that started the night of his admission. He was recently hospitalized for syncope last month and discharged on a 30 day cardiac monitor. Beta blocker held then due to iatrogenic bradycardia to the 20s. He is on MMF, Tacrolimus and Prednisone. He denies dysuria or suprapubic pain. He does not have any open wounds.     In the ED, he was afebrile, HR 70, /70. Labs remarkable for WBC 14, Hg 10 (baseline), Na 131, K 2.8, BUN 39, Cr 2.5 (baseline). At 3:13AM, he had an 18 beat run of Vtach before he had electrolyte repletion. He reported palpitations during the event that resolved. He denied chest pain, abdominal pain, shortness of breath and nausea.  CXR was revealing for RUL consolidation concerning for hospital acquired pneumonia given his recent hospitalization. He was admitted for pneumonia and placed on broad spectrum antibiotics.     * No surgery found *      Hospital Course:   Admitted for RUL HAP. KTM consulted for tacro dosing as well as PHUONG; resumed prednisone & held MMF. Started on empiric vanc & pip-tazo. MRSA -ve; d/c'd vanc. Clinically  improved; transitioned pip-tazo to PO levofloxacin (renally dosed) to complete 7 day course (EED 03/22). Kidney transplant team monitoring and adjusting tacro levels- will set up follow-up appt upon d/c. Hospital course c/b about 5 watery stools- Cdiff PCR/Ag positive; toxins negative. Patient is AF and WBC wnl.  No further episodes of diarrhea in over 12 hours. However, since patient is immunocompromised and on antibiotic treatment for pneumonia,  we will treat with 10 day course of oral vancomycin. Recommend close f/u with PCP.      Goals of Care Treatment Preferences:  Code Status: Full Code      SDOH Screening:  The patient was screened for utility difficulties, food insecurity, transport difficulties, housing insecurity, and interpersonal safety and there were no concerns identified this admission.     Consults:   Consults (From admission, onward)          Status Ordering Provider     Inpatient consult to Kidney/Pancreas Transplant Medicine  Once        Provider:  (Not yet assigned)    Acknowledged MADDIE HEWITT            Assessment & Plan  Hospital-acquired pneumonia  Patient has a diagnosis of pneumonia. The cause of the pneumonia is suspected to be bacterial in etiology but organism is not known. The pneumonia is stable. The patient has the following signs/symptoms of pneumonia: cough and sputum production. The patient does not have a current oxygen requirement and the patient does not have a home oxygen requirement. I have reviewed the pertinent imaging. The following cultures have been collected: Blood cultures and Sputum culture The culture results are listed below.     Current antimicrobial regimen consists of the antibiotics listed below. Will monitor patient closely and Adjust treatment plan as follows      Antibiotics (From admission, onward)      Start     Stop Route Frequency Ordered    03/19/25 1200  vancomycin 125 mg/5 mL oral solution 125 mg  (C. difficile Infection (CDI) Treatment Order  Panel)         03/29/25 1159 Oral Every 6 hours 03/19/25 1023    03/17/25 1315  levoFLOXacin tablet 750 mg         03/23/25 0859 Oral Every other day 03/17/25 1303          Microbiology Results (last 7 days)       Procedure Component Value Units Date/Time    Blood culture x two cultures. Draw prior to antibiotics. [[id number]] Collected: 03/16/25 0139    Order Status: Completed Specimen: Blood from Peripheral, Upper Arm, Left Updated: 03/19/25 0613     Blood Culture, Routine No Growth to date      No Growth to date      No Growth to date      No Growth to date    Narrative:      Aerobic and anaerobic    Blood culture x two cultures. Draw prior to antibiotics. [[id number]] Collected: 03/16/25 0217    Order Status: Completed Specimen: Blood from Peripheral, Hand, Right Updated: 03/19/25 0613     Blood Culture, Routine No Growth to date      No Growth to date      No Growth to date      No Growth to date    Narrative:      Aerobic and anaerobic    C Diff Toxin by PCR [[id number]]  (Abnormal) Collected: 03/18/25 1550    Order Status: Completed Updated: 03/18/25 2333     C. diff PCR Positive    Narrative:      A positive result will be a Hospital Onset CDIFF case. Review  the  Diarrhea Decision Tree to ensure testing is  appropriate. If liquid stool unable to be collected within 24  hours, this order will automatically be discontinued.  [email or web address]  [email or web address]    Clostridium difficile EIA [[id number]]  (Abnormal) Collected: 03/18/25 1550    Order Status: Completed Specimen: Stool Updated: 03/18/25 2234     C. diff Antigen Positive     C difficile Toxins A+B, EIA Negative     Comment: Testing not recommended for children <24 months old.       Narrative:      A positive result will be a Hospital Onset CDIFF case. Review  the  Diarrhea Decision Tree to ensure testing is  appropriate. If liquid stool unable to be collected within 24  hours, this order will  automatically be discontinued.  [email or web address]  [email or web address]    MRSA Screen by PCR [[id number]] Collected: 03/16/25 2019    Order Status: Completed Specimen: Nasal Swab Updated: 03/16/25 2228     MRSA SCREEN BY PCR Not Detected    Respiratory Infection Panel (PCR), Nasopharyngeal [[id number]] Collected: 03/16/25 1211    Order Status: Completed Specimen: Nasopharyngeal Swab Updated: 03/16/25 1344     Respiratory Infection Panel Source NP Swab     Adenovirus Not Detected     Coronavirus 229E, Common Cold Virus Not Detected     Coronavirus HKU1, Common Cold Virus Not Detected     Coronavirus NL63, Common Cold Virus Not Detected     Coronavirus OC43, Common Cold Virus Not Detected     Comment: The Coronavirus strains detected in this test cause the common cold.  These strains are not the COVID-19 (novel Coronavirus)strain   associated with the respiratory disease outbreak.          SARS-CoV2 (COVID-19) Qualitative PCR Not Detected     Human Metapneumovirus Not Detected     Human Rhinovirus/Enterovirus Not Detected     Influenza A Not Detected     Influenza B Not Detected     Parainfluenza Virus 1 Not Detected     Parainfluenza Virus 2 Not Detected     Parainfluenza Virus 3 Not Detected     Parainfluenza Virus 4 Not Detected     Respiratory Syncytial Virus Not Detected     Bordetella Parapertussis (SC7855) Not Detected     Bordetella pertussis (ptxP) Not Detected     Chlamydia pneumoniae Not Detected     Mycoplasma pneumoniae Not Detected    Narrative:      Assay not valid for lower respiratory specimens, alternate  testing required.    Culture, Respiratory with Gram Stain [[id number]]     Order Status: No result Specimen: Respiratory     Influenza A & B by Molecular [[id number]] Collected: 03/16/25 0142    Order Status: Completed Specimen: Nasopharyngeal Swab Updated: 03/16/25 0222     Influenza A, Molecular Negative     Influenza B, Molecular Negative     Flu A & B  Source Nasal swab        COVID, flu -ve  RUL consolidation on CXR  MRSA PCR -ve; d/c'd vanc  Rcx, RIP -ve  Continue BSA (vanc, pip-tazo); add azithro if clinically worsens  Transitioned to PO levofloxacin 750 mg QOD (renally dosed) to complete 7 day course (EED 03/22)  Type 2 diabetes mellitus with stage 3a chronic kidney disease, with long-term current use of insulin  Diabetic polyneuropathy associated with type 2 diabetes mellitus  Lab Results   Component Value Date    HGBA1C 8.6 (H) 01/27/2025    HGBA1C 8.1 (H) 12/07/2024    HGBA1C 7.4 (H) 11/19/2024     Recent Labs     03/17/25  1623 03/17/25  2116 03/18/25  0515 03/18/25  0748 03/19/25  0228 03/19/25  0721   POCTGLUCOSE  --    < >  --    < >  --  154*   *  --  124*  --  129*  --     < > = values in this interval not displayed.     Antihyperglycemics (From admission, onward)      Start     Stop Route Frequency Ordered    03/17/25 1130  insulin aspart U-100 pen 5 Units         -- SubQ 3 times daily with meals 03/17/25 1020    03/16/25 0900  empagliflozin (Jardiance) tablet 10 mg        Question Answer Comment   Does this patient have a diagnosis of heart failure? Yes    Does this patient have type 1 diabetes or diabetic ketoacidosis? No    Does this patient have symptomatic hypotension? No    Is the patient NPO or pending major surgery in next 3 days or less? No        -- Oral Daily 03/16/25 0447    03/16/25 0700  insulin glargine U-100 (Lantus) pen 15 Units         -- SubQ Every morning 03/16/25 0517    03/16/25 0549  insulin aspart U-100 pen 0-5 Units         -- SubQ Before meals & nightly PRN 03/16/25 0450        Basal/prandial/SSI titrated PRN goal Glc 140-180  Continue home gabapentin for neuropathy  Hypoglycemia protocol  Hold PO antiglycemics while hospitalized  Diet diabetic   Essential hypertension  Most recent BP: (!) 156/70 (03/19/25 0800); 24 hr range BP  Min: 152/87  Max: 191/90  Current meds: doxazosin tablet 4 mg, Daily, Oral  hydrALAZINE  tablet 100 mg, Every 8 hours, Oral  valsartan tablet 320 mg, Daily, Oral  valsartan (DIOVAN) tablet, Daily, Oral  Adjust regimen PRN goal SBP <180  Resume ARB  Hold diuretic in s/o PHUONG   Hyperlipidemia  Continue statin  Persistent atrial fibrillation  Patient has paroxysmal (<7 days) atrial fibrillation. Patient is currently in sinus rhythm. [id number] Score: 3. The patients heart rate in the last 24 hours is as follows: Pulse  Min: 74  Max: 102  Antiarrhythmics:    Anticoagulants: rivaroxaban tablet 15 mg, With dinner, Oral  Replete lytes with a goal of K>4, Mg >2  Patient is anticoagulated, see medications listed above.  Patient's afib is currently controlled  Elevated troponin  Elevated troponin on admission with no chest pressure/pain, sob, abdominal pain, nausea. It could be related to his underlying infection. Continue to monitor for symptoms. No indication for ACS at this time. ECG w/ some V5-6 changes; ischemia?  Repeat ECG  Cardiac tele  Replace K & Mg PRN  Consider Cards consult  History of CVA (cerebrovascular accident)  Stable  On DOAC & statin; not on DAPT  Wide-complex tachycardia  18 beats of monomorphic, wide complex tachycardia noted on 3/16/25 around 3AM  Repeat ECG  Cardiac tele  Replace K & Mg PRN  Consider Cards consult  S/P cadaveric kidney transplant 11/5/2016. ESRD secondary to HTN/DMII  Immunosuppressed status  History of kidney transplant on immunosuppression  KTM consulted  Hold MMF in s/o infxn  Titrate tacro dose per KTM  Resume prednisone  Daily tacro level  Hyponatremia  Hyponatremia is likely due to renal insufficiency. The patient's most recent sodium results are listed below.  Recent Labs     03/17/25  1623 03/18/25  0515 03/19/25  0228   * 134* 133*     Plan  - Correct the sodium by 4-6mEq in 24 hours.   - Monitor sodium Daily.   - Patient hyponatremia is improving    Hypokalemia  Patient's most recent potassium results are listed below.   Recent Labs     03/17/25  1623  03/18/25  0515 03/19/25  0228   K 3.5 3.4* 3.6     Plan  - Replete potassium per protocol  - Monitor potassium Daily  - Patient's hypokalemia is stable      Final Active Diagnoses:    Diagnosis Date Noted POA    PRINCIPAL PROBLEM:  Hospital-acquired pneumonia [J18.9, Y95] 03/16/2025 Yes    Wide-complex tachycardia [R00.0] 03/16/2025 Yes    Hyponatremia [E87.1] 02/20/2025 Yes    Hypokalemia [E87.6] 02/20/2025 Yes    History of CVA (cerebrovascular accident) [Z86.73] 01/30/2025 Not Applicable    Immunosuppressed status [D84.9] 04/01/2024 Yes    Elevated troponin [R79.89] 07/10/2022 Yes    Persistent atrial fibrillation [I48.19] 08/09/2019 Yes    S/P cadaveric kidney transplant 11/5/2016. ESRD secondary to HTN/DMII [Z94.0] 11/05/2016 Not Applicable    Type 2 diabetes mellitus with stage 3a chronic kidney disease, with long-term current use of insulin [E11.22, N18.31, Z79.4] 07/29/2014 Not Applicable    Essential hypertension [I10] 07/29/2014 Yes    Diabetic polyneuropathy associated with type 2 diabetes mellitus [E11.42] 07/29/2014 Yes    Hyperlipidemia [E78.5] 07/29/2014 Yes      Problems Resolved During this Admission:       Discharged Condition: stable    Disposition:     Follow Up:   Follow-up Information       Mima Mack MD. Schedule an appointment as soon as possible for a visit in 1 week(s).    Specialty: Internal Medicine  Why: Please follow-up with primary care physician for repeat chest x ray and blood work. Follow-up about resolution of watery stools.  Contact information:  [facility] [address] [phone]               KIDNEY, TRANSPLANT. Schedule an appointment as soon as possible for a visit in 1 week(s).    Why: Please follow-up with Kidney Transplant Doctor for hospital follow-up. Please discuss when to resume medications such as mycophenylate, hydrochlorothizde. Please discuss new tacrolimus dosing.                         Patient Instructions:      Ambulatory  "referral/consult to Acute Care at Home   Standing Status: Future   Referral Priority: Routine Referral Type: Consultation   Referred to Provider: PRABHU PROVIDER Requested Specialty: Internal Medicine   Number of Visits Requested: 1       Significant Diagnostic Studies: N/A    Pending Diagnostic Studies:       None           Medications:  Reconciled Home Medications:      Medication List        PAUSE taking these medications      hydroCHLOROthiazide 12.5 MG Tab  Wait to take this until your doctor or other care provider tells you to start again.  Take 1 tablet (12.5 mg total) by mouth once daily.     mycophenolate 250 mg Cap  Wait to take this until your doctor or other care provider tells you to start again.  Commonly known as: CELLCEPT  Take 2 capsules (500 mg total) by mouth 2 (two) times daily.            START taking these medications      Lactobacillus rhamnosus GG 10 billion cell capsule  Commonly known as: CULTURELLE  Take 1 capsule by mouth once daily.     levoFLOXacin 750 MG tablet  Commonly known as: LEVAQUIN  Take 1 tablet (750 mg total) by mouth every other day. Take 1 dose on 3/21/24  Start taking on: March 21, 2025     vancomycin 125 MG capsule  Commonly known as: VANCOCIN  Take 1 capsule (125 mg total) by mouth 4 (four) times daily. for 10 days            CHANGE how you take these medications      tacrolimus 1 MG Cap  Commonly known as: PROGRAF  Take 1 capsule (1 mg total) by mouth every 12 (twelve) hours.  What changed:   medication strength  how much to take  when to take this  additional instructions     valsartan 320 MG tablet  Commonly known as: DIOVAN  Take 1 tablet (320 mg total) by mouth once daily.  Start taking on: March 20, 2025  What changed: additional instructions            CONTINUE taking these medications      atorvastatin 80 MG tablet  Commonly known as: LIPITOR  Take 1 tablet (80 mg total) by mouth once daily.     BD ULTRA-FINE LO PEN NEEDLE 32 gauge x 5/32" Ndle  Generic drug: " pen needle, diabetic  USE TO ADMINISTER INSULIN 4 TIMES DAILY.     blood-glucose sensor Kayla  Gin 3, use as directed, change every 14 days , e 11.65     doxazosin 4 MG tablet  Commonly known as: CARDURA  Take 1 tablet (4 mg total) by mouth once daily.     furosemide 40 MG tablet  Commonly known as: LASIX  Take 2 tablets (80 mg total) by mouth daily as needed (for swelling).     gabapentin 300 MG capsule  Commonly known as: NEURONTIN  Take 1 capsule by mouth in the morning, 1 capsule midday, and 3 capsules at night.     hydrALAZINE 100 MG tablet  Commonly known as: APRESOLINE  Take 1 tablet (100 mg total) by mouth every 8 (eight) hours.     JARDIANCE 10 mg tablet  Generic drug: empagliflozin  Take 1 tablet (10 mg total) by mouth once daily.     LANTUS SOLOSTAR U-100 INSULIN 100 unit/mL (3 mL) Inpn pen  Generic drug: insulin glargine U-100 (Lantus)  Inject 20 Units into the skin every morning.     magnesium oxide 400 mg (241.3 mg magnesium) tablet  Commonly known as: MAG-OX  Take 1 tablet (400 mg total) by mouth 2 (two) times daily.     meclizine 25 mg tablet  Commonly known as: ANTIVERT  Take 1 tablet (25 mg total) by mouth 3 (three) times daily as needed for Dizziness.     MIRALAX 17 gram Pwpk  Generic drug: polyethylene glycol  Take 17 gm (mixed in liquid) by mouth every day.     * NovoLOG Flexpen U-100 Insulin 100 unit/mL (3 mL) Inpn pen  Generic drug: insulin aspart U-100  Inject 10 units under the skin w/ breakfast, 7 units at lunch and dinner. Scale 180-230+2, 231-280+4, 281-330+6, 331-380+8, >380+10.  Max daily 57 units.     * insulin aspart U-100 100 unit/mL injection  Commonly known as: NovoLOG U-100 Insulin aspart  Use in pump, max daily 100 units.     OMNIPOD 5 G6-G7 INTRO KT(GEN5) Crtg  Generic drug: insulin  cart,aut,G6/7,cntr  Use as directed.     predniSONE 5 MG tablet  Commonly known as: DELTASONE  Take 1 tablet (5 mg total) by mouth once daily.     TRUE METRIX GLUCOSE METER Misc  Generic drug:  blood-glucose meter  Use to check blood glucose 1 times daily, to use with insurance preferred meter     TRUE METRIX GLUCOSE TEST STRIP Strp  Generic drug: blood sugar diagnostic  Use to check blood glucose 1 times daily, to use with insurance preferred meter     TRUEPLUS LANCETS 30 gauge Misc  Generic drug: lancets  Use to check blood glucose 1 times daily, to use with insurance preferred meter     VITAMIN D2 1,250 mcg (50,000 unit) capsule  Generic drug: ergocalciferol  Take 1 capsule (50,000 Units total) by mouth every 7 days.     XARELTO 15 mg Tab  Generic drug: rivaroxaban  Take 1 tablet (15 mg total) by mouth daily with dinner or evening meal.           * This list has 2 medication(s) that are the same as other medications prescribed for you. Read the directions carefully, and ask your doctor or other care provider to review them with you.                  Indwelling Lines/Drains at time of discharge:   Lines/Drains/Airways       None                   Time spent on the discharge of patient: 30 minutes         Sandra Cook MD  Department of Hospital Medicine  [facility] - Internal Medicine Telemetry

## 2025-03-19 NOTE — NURSING
AAOx4. Discharged home by personal transportation. Educated on discharge, medication, and follow up appts; voiced understanding. Prescription medication delivered to bedside.

## 2025-03-19 NOTE — DISCHARGE INSTRUCTIONS
.Our goal at [facility] is to always give you outstanding care and exceptional service. You may receive a survey from [employer] by mail, text or e-mail in the next 24-48 hours asking about the care you received with us. The survey should only take 5-10 minutes to complete and is very important to us.     Your feedback provides us with a way to recognize our staff who work tirelessly to provide the best care! Also, your responses help us learn how to improve when your experience was below our aspiration of excellence. We are always looking for ways to improve your stay. We WILL use your feedback to continue making improvements to help us provide the highest quality care. We keep your personal information and feedback confidential. We appreciate your time completing this survey and can't wait to hear from you!!!    We look forward to your continued care with us! Thanks so much for choosing [facility] for your healthcare needs!

## 2025-03-19 NOTE — PLAN OF CARE
Problem: Adult Inpatient Plan of Care  Goal: Plan of Care Review  Outcome: Not Progressing  Goal: Optimal Comfort and Wellbeing  Outcome: Not Progressing     Problem: Pneumonia  Goal: Fluid Balance  Outcome: Progressing     Problem: Infection  Goal: Absence of Infection Signs and Symptoms  Outcome: Progressing

## 2025-03-19 NOTE — ASSESSMENT & PLAN NOTE
Patient has a diagnosis of pneumonia. The cause of the pneumonia is suspected to be bacterial in etiology but organism is not known. The pneumonia is stable. The patient has the following signs/symptoms of pneumonia: cough and sputum production. The patient does not have a current oxygen requirement and the patient does not have a home oxygen requirement. I have reviewed the pertinent imaging. The following cultures have been collected: Blood cultures and Sputum culture The culture results are listed below.     Current antimicrobial regimen consists of the antibiotics listed below. Will monitor patient closely and Adjust treatment plan as follows      Antibiotics (From admission, onward)      Start     Stop Route Frequency Ordered    03/19/25 1200  vancomycin 125 mg/5 mL oral solution 125 mg  (C. difficile Infection (CDI) Treatment Order Panel)         03/29/25 1159 Oral Every 6 hours 03/19/25 1023    03/17/25 1315  levoFLOXacin tablet 750 mg         03/23/25 0859 Oral Every other day 03/17/25 1303          Microbiology Results (last 7 days)       Procedure Component Value Units Date/Time    Blood culture x two cultures. Draw prior to antibiotics. [[id number]] Collected: 03/16/25 0139    Order Status: Completed Specimen: Blood from Peripheral, Upper Arm, Left Updated: 03/19/25 0613     Blood Culture, Routine No Growth to date      No Growth to date      No Growth to date      No Growth to date    Narrative:      Aerobic and anaerobic    Blood culture x two cultures. Draw prior to antibiotics. [[id number]] Collected: 03/16/25 0217    Order Status: Completed Specimen: Blood from Peripheral, Hand, Right Updated: 03/19/25 0613     Blood Culture, Routine No Growth to date      No Growth to date      No Growth to date      No Growth to date    Narrative:      Aerobic and anaerobic    C Diff Toxin by PCR [[id number]]  (Abnormal) Collected: 03/18/25 1550    Order Status: Completed Updated: 03/18/25 2333     C. diff PCR  Positive    Narrative:      A positive result will be a Hospital Onset CDIFF case. Review  the  Diarrhea Decision Tree to ensure testing is  appropriate. If liquid stool unable to be collected within 24  hours, this order will automatically be discontinued.  [email or web address]  [email or web address]    Clostridium difficile EIA [[id number]]  (Abnormal) Collected: 03/18/25 1550    Order Status: Completed Specimen: Stool Updated: 03/18/25 2234     C. diff Antigen Positive     C difficile Toxins A+B, EIA Negative     Comment: Testing not recommended for children <24 months old.       Narrative:      A positive result will be a Hospital Onset CDIFF case. Review  the  Diarrhea Decision Tree to ensure testing is  appropriate. If liquid stool unable to be collected within 24  hours, this order will automatically be discontinued.  [email or web address]  [email or web address]    MRSA Screen by PCR [[id number]] Collected: 03/16/25 2019    Order Status: Completed Specimen: Nasal Swab Updated: 03/16/25 2228     MRSA SCREEN BY PCR Not Detected    Respiratory Infection Panel (PCR), Nasopharyngeal [[id number]] Collected: 03/16/25 1211    Order Status: Completed Specimen: Nasopharyngeal Swab Updated: 03/16/25 1344     Respiratory Infection Panel Source NP Swab     Adenovirus Not Detected     Coronavirus 229E, Common Cold Virus Not Detected     Coronavirus HKU1, Common Cold Virus Not Detected     Coronavirus NL63, Common Cold Virus Not Detected     Coronavirus OC43, Common Cold Virus Not Detected     Comment: The Coronavirus strains detected in this test cause the common cold.  These strains are not the COVID-19 (novel Coronavirus)strain   associated with the respiratory disease outbreak.          SARS-CoV2 (COVID-19) Qualitative PCR Not Detected     Human Metapneumovirus Not Detected     Human Rhinovirus/Enterovirus Not Detected     Influenza A Not  Detected     Influenza B Not Detected     Parainfluenza Virus 1 Not Detected     Parainfluenza Virus 2 Not Detected     Parainfluenza Virus 3 Not Detected     Parainfluenza Virus 4 Not Detected     Respiratory Syncytial Virus Not Detected     Bordetella Parapertussis (RI1408) Not Detected     Bordetella pertussis (ptxP) Not Detected     Chlamydia pneumoniae Not Detected     Mycoplasma pneumoniae Not Detected    Narrative:      Assay not valid for lower respiratory specimens, alternate  testing required.    Culture, Respiratory with Gram Stain [[id number]]     Order Status: No result Specimen: Respiratory     Influenza A & B by Molecular [[id number]] Collected: 03/16/25 0142    Order Status: Completed Specimen: Nasopharyngeal Swab Updated: 03/16/25 0222     Influenza A, Molecular Negative     Influenza B, Molecular Negative     Flu A & B Source Nasal swab        COVID, flu -ve  RUL consolidation on CXR  MRSA PCR -ve; d/c'd vanc  Rcx, RIP -ve  Continue BSA (vanc, pip-tazo); add azithro if clinically worsens  Transitioned to PO levofloxacin 750 mg QOD (renally dosed) to complete 7 day course (EED 03/22)

## 2025-03-19 NOTE — ASSESSMENT & PLAN NOTE
Patient's most recent potassium results are listed below.   Recent Labs     03/17/25  1623 03/18/25  0515 03/19/25  0228   K 3.5 3.4* 3.6     Plan  - Replete potassium per protocol  - Monitor potassium Daily  - Patient's hypokalemia is stable

## 2025-03-19 NOTE — CARE UPDATE
Unit [facility] Care Support Interaction      I have reviewed the chart of Ravi Zamorano who is hospitalized for Hospital-acquired pneumonia. The patient is currently located in the following unit: IMTA      I have seen and examined the patient and provided the following support:     Patient experience rounds - positive experience reported       Laurence Jett PA-C  Unit Based [facility]

## 2025-03-19 NOTE — CARE UPDATE
[facility] - Medical ICU  Nephrology        Patient Name: Ravi Zamorano   MRN: [id number]   Current Provider: Sandra Kern*  Primary Care Provider: Mima Mack MD   Admission Date: 3/16/2025   Hospital Day: 3  Bed: 1131/1131 A  Principal Problem: Hospital-acquired pneumonia            Nephrology Chart Review      Intake/Output Summary (Last 24 hours) at 3/19/2025 1426  Last data filed at 3/19/2025 0826  Gross per 24 hour   Intake 480 ml   Output 800 ml   Net -320 ml       Vitals:    03/19/25 0723 03/19/25 0800 03/19/25 1129 03/19/25 1154   BP:  (!) 156/70     BP Location:  Left arm     Patient Position:  Lying     Pulse: 79  75 97   Resp:       Temp: 98.9 °F (37.2 °C)  99 °F (37.2 °C)    TempSrc: Oral  Oral    SpO2: 96%  95%    Weight:       Height:           Recent Labs   Lab 03/17/25  0510 03/17/25  1623 03/18/25  0515 03/19/25  0228   * 133* 134* 133*   K 3.2* 3.5 3.4* 3.6   CL 98 98 100 102   CO2 22* 24 22* 19*   BUN 38* 39* 37* 34*   CREATININE 2.4* 2.5* 2.5* 2.2*   CALCIUM 7.8* 7.9* 8.2* 8.0*   PHOS 2.6*  --  3.3 2.4*       - Tacrolimus levels in good range  - Continue with same dose  - Restart the MMF once the infection resolves or a day after the antibiotics course completes    No other related issues identified. Please call Nephrology as needed; We will continue to follow.      Harpreet Castano MD.  Clinical Nephrology Fellow, PGY-4  [facility], [address]

## 2025-03-19 NOTE — ASSESSMENT & PLAN NOTE
Patient has paroxysmal (<7 days) atrial fibrillation. Patient is currently in sinus rhythm. [id number] Score: 3. The patients heart rate in the last 24 hours is as follows: Pulse  Min: 74  Max: 102  Antiarrhythmics:    Anticoagulants: rivaroxaban tablet 15 mg, With dinner, Oral  Replete lytes with a goal of K>4, Mg >2  Patient is anticoagulated, see medications listed above.  Patient's afib is currently controlled

## 2025-03-19 NOTE — PLAN OF CARE
Problem: Adult Inpatient Plan of Care  Goal: Plan of Care Review  Outcome: Progressing  Goal: Patient-Specific Goal (Individualized)  Outcome: Progressing  Goal: Absence of Hospital-Acquired Illness or Injury  Outcome: Progressing  Goal: Optimal Comfort and Wellbeing  Outcome: Progressing  Goal: Readiness for Transition of Care  Outcome: Progressing     Problem: Diabetes Comorbidity  Goal: Blood Glucose Level Within Targeted Range  Outcome: Progressing     Problem: Acute Kidney Injury/Impairment  Goal: Fluid and Electrolyte Balance  Outcome: Progressing  Goal: Improved Oral Intake  Outcome: Progressing  Goal: Effective Renal Function  Outcome: Progressing     Problem: Pneumonia  Goal: Fluid Balance  Outcome: Progressing  Goal: Resolution of Infection Signs and Symptoms  Outcome: Progressing  Goal: Effective Oxygenation and Ventilation  Outcome: Progressing     Problem: Fall Injury Risk  Goal: Absence of Fall and Fall-Related Injury  Outcome: Progressing     Problem: Infection  Goal: Absence of Infection Signs and Symptoms  Outcome: Progressing

## 2025-03-19 NOTE — ASSESSMENT & PLAN NOTE
Most recent BP: (!) 156/70 (03/19/25 0800); 24 hr range BP  Min: 152/87  Max: 191/90  Current meds: doxazosin tablet 4 mg, Daily, Oral  hydrALAZINE tablet 100 mg, Every 8 hours, Oral  valsartan tablet 320 mg, Daily, Oral  valsartan (DIOVAN) tablet, Daily, Oral  Adjust regimen PRN goal SBP <180  Resume ARB  Hold diuretic in s/o PHUONG

## 2025-03-19 NOTE — ASSESSMENT & PLAN NOTE
Hyponatremia is likely due to renal insufficiency. The patient's most recent sodium results are listed below.  Recent Labs     03/17/25  1623 03/18/25  0515 03/19/25  0228   * 134* 133*     Plan  - Correct the sodium by 4-6mEq in 24 hours.   - Monitor sodium Daily.   - Patient hyponatremia is improving

## 2025-03-21 LAB
BACTERIA BLD CULT: NORMAL
BACTERIA BLD CULT: NORMAL

## 2025-03-21 NOTE — PLAN OF CARE
[facility] - Internal Medicine Telemetry  Discharge Final Note    Primary Care Provider: Mima Mack MD    Expected Discharge Date: 3/19/2025    Patient medically cleared for discharge. Patient family/friends to provide transportation. Patient cleared from CM standpoint.    Final Discharge Note (most recent)       Final Note - 03/21/25 0855          Final Note    Assessment Type Final Discharge Note (P)      Anticipated Discharge Disposition Home or Self Care (P)      Hospital Resources/Appts/Education Provided Appointments scheduled and added to AVS;Provided patient/caregiver with written discharge plan information (P)         Post-Acute Status    Post-Acute Authorization Other (P)      Coverage [employer] (P)      Other Status No Post-Acute Service Needs (P)      Patient choice form signed by patient/caregiver List with quality metrics by geographic area provided (P)      Discharge Delays None known at this time (P)                      Important Message from Medicare  Important Message from Medicare regarding Discharge Appeal Rights: Given to patient/caregiver, Explained to patient/caregiver, Signed/date by patient/caregiver     Date IMM was signed: 03/18/25  Time IMM was signed: 1314    Contact Info       Mima Mack MD   Specialty: Internal Medicine   Relationship: PCP - General    [address] [facility] [address]   Phone: [phone]       Next Steps: Schedule an appointment as soon as possible for a visit in 1 week(s)    Instructions: Please follow-up with primary care physician for repeat chest x ray and blood work. Follow-up about resolution of watery stools.    Kidney, Transplant        Next Steps: Schedule an appointment as soon as possible for a visit in 1 week(s)    Instructions: Please follow-up with Kidney Transplant Doctor for hospital follow-up. Please discuss when to resume medications such as mycophenylate, hydrochlorothizde. Please discuss new tacrolimus dosing.           Future Appointments   Date Time Provider Department Center   3/21/2025 11:00 AM EPO INJECTION SCHEDULE [facility] NEPHRO [facility]   3/27/2025  9:00 AM [facility] LAB [facility]   4/3/2025  9:00 AM Karan Lopez, APRN, FNP [facility] IM [facility]   4/4/2025  9:00 AM Argelia Bridges RD [facility]   4/16/2025  2:00 PM EKG, APPT [facility] EKG [facility]   4/16/2025  2:30 PM Mima Lyons NP [facility] ARRHYTH [facility]   5/12/2025  8:30 AM [facility] LAB [facility]   5/15/2025 11:00 AM Karan Lopez, APRN, FNP [facility]   6/16/2025 10:30 AM Nayely Vital, PALuis AntonioC [facility] CARDIO [facility]       Discharge Plan A and Plan B have been determined by review of patient's clinical status, future medical and therapeutic needs, and coverage/benefits for post-acute care in coordination with multidisciplinary team members.    Hong Morris RN-CM  Case Management  [facility]  [phone]

## 2025-03-24 ENCOUNTER — TELEPHONE (OUTPATIENT)
Dept: ELECTROPHYSIOLOGY | Facility: CLINIC | Age: 70
End: 2025-03-24
Payer: MEDICARE

## 2025-03-24 NOTE — TELEPHONE ENCOUNTER
Spoke with patient regarding patient activated alert on 30 day monitor on 3/21/25 at 1231.  States he did not press the button and did not feel symptomatic that day.  Monitor showing pAF, on OAC.  Reminded patient of EP follow up 4/16/25 to review monitor results.  Pt verbalized understanding.

## 2025-03-27 ENCOUNTER — LAB VISIT (OUTPATIENT)
Dept: LAB | Facility: HOSPITAL | Age: 70
End: 2025-03-27
Attending: NURSE PRACTITIONER
Payer: MEDICARE

## 2025-03-27 DIAGNOSIS — N18.31 TYPE 2 DIABETES MELLITUS WITH STAGE 3A CHRONIC KIDNEY DISEASE, WITH LONG-TERM CURRENT USE OF INSULIN: ICD-10-CM

## 2025-03-27 DIAGNOSIS — Z78.9 HEPATITIS C ANTIBODY TEST NEGATIVE: ICD-10-CM

## 2025-03-27 DIAGNOSIS — E11.22 TYPE 2 DIABETES MELLITUS WITH STAGE 3A CHRONIC KIDNEY DISEASE, WITH LONG-TERM CURRENT USE OF INSULIN: ICD-10-CM

## 2025-03-27 DIAGNOSIS — Z79.4 TYPE 2 DIABETES MELLITUS WITH STAGE 3A CHRONIC KIDNEY DISEASE, WITH LONG-TERM CURRENT USE OF INSULIN: ICD-10-CM

## 2025-03-27 LAB
EAG (OHS): 192 MG/DL (ref 68–131)
HBA1C MFR BLD: 8.3 % (ref 4–5.6)

## 2025-03-27 PROCEDURE — 87522 HEPATITIS C REVRS TRNSCRPJ: CPT | Mod: HCNC

## 2025-03-27 PROCEDURE — 83036 HEMOGLOBIN GLYCOSYLATED A1C: CPT | Mod: HCNC

## 2025-03-27 PROCEDURE — 36415 COLL VENOUS BLD VENIPUNCTURE: CPT | Mod: HCNC,PN

## 2025-03-28 ENCOUNTER — CLINICAL SUPPORT (OUTPATIENT)
Dept: NEPHROLOGY | Facility: CLINIC | Age: 70
End: 2025-03-28
Payer: MEDICARE

## 2025-03-28 VITALS
DIASTOLIC BLOOD PRESSURE: 76 MMHG | SYSTOLIC BLOOD PRESSURE: 159 MMHG | BODY MASS INDEX: 27.39 KG/M2 | HEART RATE: 79 BPM | WEIGHT: 201.94 LBS | OXYGEN SATURATION: 97 %

## 2025-03-28 DIAGNOSIS — N18.30 STAGE 3 CHRONIC KIDNEY DISEASE, UNSPECIFIED WHETHER STAGE 3A OR 3B CKD: Primary | ICD-10-CM

## 2025-03-28 DIAGNOSIS — D64.9 SYMPTOMATIC ANEMIA: ICD-10-CM

## 2025-03-28 LAB — HCV RNA SERPL NAA+PROBE-LOG IU: NOT DETECTED {LOG_IU}/ML

## 2025-03-28 PROCEDURE — 99999 PR PBB SHADOW E&M-EST. PATIENT-LVL II: CPT | Mod: PBBFAC,HCNC,,

## 2025-04-01 NOTE — PHYSICIAN QUERY
Provider, due to the conflicting clinical picture, please clinically validate the diagnosis of PHUONG. If validated, please provide additional clinical support for the diagnosis.   The condition is not confirmed and/or it has been ruled out

## 2025-04-01 NOTE — PHYSICIAN QUERY
Given the conflicting documentation, please clarify the stage of CKD.  Chronic kidney disease (CKD) stage 3b

## 2025-04-02 ENCOUNTER — TELEPHONE (OUTPATIENT)
Dept: INTERNAL MEDICINE | Facility: CLINIC | Age: 70
End: 2025-04-02
Payer: MEDICARE

## 2025-04-02 NOTE — TELEPHONE ENCOUNTER
Pt was notified of provider message.  He has no questions.    ----- Message from REYES Monroy FNP sent at 4/2/2025  8:09 AM CDT -----  Regarding: a1c  A1c is slowly coming down8.6 now 8.3%Let pt knowThdesireesIribianka

## 2025-04-02 NOTE — PROGRESS NOTES
CHIEF COMPLAINT: Type 2 Diabetes     HPI: Mr. Ravi Zamorano is a 69 y.o. male who was diagnosed with Type 2 DM in 2000.  S/p kidney transplant 2016  Accompanied by niece.  Recent hospitalization for pneumonia 3/2025.   Has not start omnipod 5.   On maycol 2.     See media.   TIR 62%   Hyperglycemia 12n-6p  Hypoglycemia- at night    F/u with cards, had ablation in 2024.   F/u with hem/onc, thrombocytopenia   Had bone marrow in summer 2024.   Having false lows.  6/2024, PHUONG, edema.  Still dealing with ankle edema.    Has f/u with nephrology this month and cardiology in 2025-jan.   H/o severe hypoglycemia < 38 mg/dl  On maintenance w/ pred.    Dietary habits:   Eggs (2), toasts or muffin wheat , turkey sausage   Skips lunch at times  Potato salad, sweet peas, meat, rice  A1c improved 8.6% to 8.3%     12/7/2024 embolic stroke , (L) weakness    Lab Results   Component Value Date    HGBA1C 8.3 (H) 03/27/2025       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    CKD (chronic kidney disease) stage 4, GFR 15-29 ml/min 07/29/2014    Colon polyps 2014    Depression - situational 07/29/2014    Diabetes type 2 since 2000 07/29/2014    Diabetic neuropathy 07/29/2014    Encounter for blood transfusion     History of cardioversion 10/03/2019    History of hepatitis C, s/p successful Rx w/ SVR24 - 9/2017 07/29/2014    Genotype 1a, treatment naive 10/2014 liver biopsy - grade 1 / stage 1 Completed Harvoni w/ SVR    Hyperlipidemia 07/29/2014    Hypertension     Neuropathy     Organ transplant candidate 07/29/2014    Pancreatitis     S/P cadaveric kidney transplant 11/5/2016. ESRD secondary to HTN/DMII 11/05/2016    Type 2 diabetes mellitus with stage 3a chronic kidney disease, with long-term current use of insulin 07/29/2014     Remains on MDI---lantus and humalog      PREVIOUS DIABETES MEDICATIONS TRIED  lantus  humalog  novolog  levemir  trulicity -wt loss   tradjenta  Metformin   ozempic   Jardiance     CURRENT DIABETIC MEDS: lantus 15 units in am, novolog 5-10 units ac w/ scale 180-230+2, etc, jardiance 10 mg daily     On MDI (injections 4 x a day)   Makes frequent changes to his/her insulin regimen on the basis of blood glucose data  Testing 4 x a day max   Patient is willing and able to use the device  Demonstrated an understanding of the technology and is motivated to use CGM  Patient expected to adhere to a comprehensive diabetes treatment plan and patient has adequate medical supervision    Has multiple impaired awareness of hypoglycemia (hypoglycemia unawareness)  Needs work with dietary habits    Diabetes Management Status    Statin: Taking  ACE/ARB: Taking    Screening or Prevention Patient's value Goal Complete/Controlled?   HgA1C Testing and Control   Lab Results   Component Value Date    HGBA1C 8.3 (H) 03/27/2025      Annually/Less than 8% Yes   Lipid profile : 12/07/2024 Annually Yes   LDL control Lab Results   Component Value Date    LDLCALC 105.4 12/07/2024    Annually/Less than 100 mg/dl  Yes   Nephropathy screening Lab Results   Component Value Date    LABMICR >5000.0 12/06/2024     Lab Results   Component Value Date    PROTEINUA 3+ (A) 03/16/2025    Annually Yes   Blood pressure BP Readings from Last 1 Encounters:   03/28/25 (!) 159/76    Less than 140/90 No   Dilated retinal exam : 08/08/2024 Annually Yes   Foot exam   : 07/03/2024 Annually No     REVIEW OF SYSTEMS  General: no weakness, fatigue, + weight loss  Eyes: no double or blurred vision, eye pain, or redness  Cardiovascular: no chest pain, palpitations, + ankle edema, or murmurs.   Respiratory: no cough or dyspnea.   GI: no heartburn, nausea, or changes in bowel patterns; good appetite.   Skin: no rashes, dryness, itching, or reactions at insulin injection sites.  Neuro: + numbness,  tingling-gabapentin,RLS, tremors, or vertigo. +loss of strength in legs  Endocrine: no polyuria, polydipsia, polyphagia, heat or cold intolerance.     Vital Signs  BP (!) 180/92 (BP Location: Left arm, Patient Position: Sitting)   Pulse 70   Ht 6' (1.829 m)   Wt 91.7 kg (202 lb 2.6 oz)   SpO2 99%   BMI 27.42 kg/m²     Hemoglobin A1C   Date Value Ref Range Status   01/27/2025 8.6 (H) 4.0 - 5.6 % Final     Comment:     ADA Screening Guidelines:  5.7-6.4%  Consistent with prediabetes  >or=6.5%  Consistent with diabetes    High levels of fetal hemoglobin interfere with the HbA1C  assay. Heterozygous hemoglobin variants (HbS, HgC, etc)do  not significantly interfere with this assay.   However, presence of multiple variants may affect accuracy.     12/07/2024 8.1 (H) 4.0 - 5.6 % Final     Comment:     ADA Screening Guidelines:  5.7-6.4%  Consistent with prediabetes  >or=6.5%  Consistent with diabetes    High levels of fetal hemoglobin interfere with the HbA1C  assay. Heterozygous hemoglobin variants (HbS, HgC, etc)do  not significantly interfere with this assay.   However, presence of multiple variants may affect accuracy.     11/19/2024 7.4 (H) 4.0 - 5.6 % Final     Comment:     ADA Screening Guidelines:  5.7-6.4%  Consistent with prediabetes  >or=6.5%  Consistent with diabetes    High levels of fetal hemoglobin interfere with the HbA1C  assay. Heterozygous hemoglobin variants (HbS, HgC, etc)do  not significantly interfere with this assay.   However, presence of multiple variants may affect accuracy.       Hemoglobin A1c   Date Value Ref Range Status   03/27/2025 8.3 (H) 4.0 - 5.6 % Final     Comment:     ADA Screening Guidelines:  5.7-6.4%  Consistent with prediabetes  >=6.5%  Consistent with diabetes    High levels of fetal hemoglobin interfere with the HbA1C  assay. Heterozygous hemoglobin variants (HbS, HgC, etc)do  not significantly interfere with this assay.   However, presence of multiple variants may affect  accuracy.        Chemistry        Component Value Date/Time     (L) 03/19/2025 0228    K 3.6 03/19/2025 0228     03/19/2025 0228    CO2 19 (L) 03/19/2025 0228    BUN 34 (H) 03/19/2025 0228    CREATININE 2.2 (H) 03/19/2025 0228     (H) 03/19/2025 0228        Component Value Date/Time    CALCIUM 8.0 (L) 03/19/2025 0228    ALKPHOS 42 03/19/2025 0228    AST 11 03/19/2025 0228    ALT <5 (L) 03/19/2025 0228    BILITOT 0.4 03/19/2025 0228    ESTGFRAFRICA 51.9 (A) 07/19/2022 0723    EGFRNONAA 44.9 (A) 07/19/2022 0723           Lab Results   Component Value Date    TSH 0.999 03/17/2025      Lab Results   Component Value Date    CHOL 172 12/07/2024    CHOL 112 (L) 07/01/2024    CHOL 131 06/07/2023     Lab Results   Component Value Date    HDL 30 (L) 12/07/2024    HDL 26 (L) 07/01/2024    HDL 31 (L) 06/07/2023     Lab Results   Component Value Date    LDLCALC 105.4 12/07/2024    LDLCALC 48.6 (L) 07/01/2024    LDLCALC 61.4 (L) 06/07/2023     Lab Results   Component Value Date    TRIG 183 (H) 12/07/2024    TRIG 187 (H) 07/01/2024    TRIG 193 (H) 06/07/2023     Lab Results   Component Value Date    CHOLHDL 17.4 (L) 12/07/2024    CHOLHDL 23.2 07/01/2024    CHOLHDL 23.7 06/07/2023       PHYSICAL EXAMINATION  Constitutional: Appears well, no distress. Reviewed vitals above.  Eyes: conjunctivae & lids intact; PERRLA, EOMs intact; optic discs   Neck: Supple, trachea midline.   Respiratory: no wheezes, even and unlabored  Cardiovascular: RRR; s1s2   Lymph: deferred   Skin: warm and dry; no injection site reactions, no acanthosis nigracans observed.  Neuro:patient alert and cooperative, normal affect; steady gait.  Psychiatric: judgement & insight intact, orientation of time, place & person intact, memory; mood & affect wnl     Diabetes Foot Exam:   Deferred     Assessment/Plan  1. Type 2 diabetes mellitus with stage 3a chronic kidney disease, with long-term current use of insulin  Hemoglobin A1C standing orders   F/u  in May   DE w/ Argelia   Settings sent   0.6 u/hr mn-0600 , 9p-mn, 0.7 u/hr 6a-9p   Target 120-130 mg/dl   Iob 3 hours  Max bolus 20 units   Max basal 2 u/hr   Isf 40 mn-mn  Icr 1 to 7.5   Customized foods/presets needed   Has kit, pods, needs dexcom g7, novolog vials       2. Diabetic polyneuropathy associated with type 2 diabetes mellitus  F/u with podiatry  Stable   On gabapentin         3. Persistent atrial fibrillation  F/u with cards  Stable   On xarelto       4. PAD (peripheral artery disease)  F/u with vascular   Stable       5. Mixed hyperlipidemia  Lab Results   Component Value Date    LDLCALC 105.4 12/07/2024     Above goal   On statin       6. History of CVA (cerebrovascular accident)  Stable   F/u with neuro prn       7. Essential hypertension  Microalbumin/Creatinine Ratio, Urine  On arb  Hydralazine taken this am   Messaged pcp today      8. Claudication of both lower extremities  F/u with vascular   Stable       9. Chronic combined systolic (congestive) and diastolic (congestive) heart failure  F/u with cards   On jardiance   Stable

## 2025-04-03 ENCOUNTER — OFFICE VISIT (OUTPATIENT)
Dept: INTERNAL MEDICINE | Facility: CLINIC | Age: 70
End: 2025-04-03
Payer: MEDICARE

## 2025-04-03 VITALS
DIASTOLIC BLOOD PRESSURE: 88 MMHG | WEIGHT: 202.19 LBS | HEART RATE: 70 BPM | HEIGHT: 72 IN | SYSTOLIC BLOOD PRESSURE: 178 MMHG | OXYGEN SATURATION: 99 % | BODY MASS INDEX: 27.39 KG/M2

## 2025-04-03 DIAGNOSIS — I73.9 PAD (PERIPHERAL ARTERY DISEASE): ICD-10-CM

## 2025-04-03 DIAGNOSIS — I10 ESSENTIAL HYPERTENSION: ICD-10-CM

## 2025-04-03 DIAGNOSIS — Z79.4 TYPE 2 DIABETES MELLITUS WITH STAGE 3A CHRONIC KIDNEY DISEASE, WITH LONG-TERM CURRENT USE OF INSULIN: Primary | ICD-10-CM

## 2025-04-03 DIAGNOSIS — I50.42 CHRONIC COMBINED SYSTOLIC (CONGESTIVE) AND DIASTOLIC (CONGESTIVE) HEART FAILURE: ICD-10-CM

## 2025-04-03 DIAGNOSIS — E11.42 DIABETIC POLYNEUROPATHY ASSOCIATED WITH TYPE 2 DIABETES MELLITUS: ICD-10-CM

## 2025-04-03 DIAGNOSIS — I73.9 CLAUDICATION OF BOTH LOWER EXTREMITIES: ICD-10-CM

## 2025-04-03 DIAGNOSIS — Z86.73 HISTORY OF CVA (CEREBROVASCULAR ACCIDENT): ICD-10-CM

## 2025-04-03 DIAGNOSIS — I48.19 PERSISTENT ATRIAL FIBRILLATION: ICD-10-CM

## 2025-04-03 DIAGNOSIS — E11.22 TYPE 2 DIABETES MELLITUS WITH STAGE 3A CHRONIC KIDNEY DISEASE, WITH LONG-TERM CURRENT USE OF INSULIN: Primary | ICD-10-CM

## 2025-04-03 DIAGNOSIS — E78.2 MIXED HYPERLIPIDEMIA: ICD-10-CM

## 2025-04-03 DIAGNOSIS — N18.31 TYPE 2 DIABETES MELLITUS WITH STAGE 3A CHRONIC KIDNEY DISEASE, WITH LONG-TERM CURRENT USE OF INSULIN: Primary | ICD-10-CM

## 2025-04-03 PROCEDURE — 99999 PR PBB SHADOW E&M-EST. PATIENT-LVL V: CPT | Mod: PBBFAC,HCNC,, | Performed by: NURSE PRACTITIONER

## 2025-04-03 RX ORDER — BLOOD-GLUCOSE SENSOR
1 EACH MISCELLANEOUS
Qty: 3 EACH | Refills: 11 | Status: SHIPPED | OUTPATIENT
Start: 2025-04-03

## 2025-04-03 RX ORDER — INSULIN ASPART 100 [IU]/ML
INJECTION, SOLUTION INTRAVENOUS; SUBCUTANEOUS
Qty: 30 ML | Refills: 11 | Status: SHIPPED | OUTPATIENT
Start: 2025-04-03

## 2025-04-03 NOTE — PATIENT INSTRUCTIONS
Keep appointment in May 2025    Lantus  15 units in am   Novolog 5-10 units before meals   Scale 180-230+2, etc.   Jardiance 10 mg daily    [email or web address]  Eat fit marion  [email or web address] marion  [email or web address]    mySugr marion    Goal  no higher than 180      Dexcom g7   Stop maycol 2 once you see Argelia   Omnipod 5 kit  Omnipod 5 pods   Novolog vials bring

## 2025-04-03 NOTE — Clinical Note
Dexcom g7 Novolog vials Have omnipod kit, pods Noted trends of 12n-6p high Lower at night   Mn-06 0.6 u/hr, 9p-mn 0.6, 6a-9p 0.7 u/hr Target 120-130 mg/dl  Max bolus 20 units  Max basal 2 u/hr  ICR 1 to 6 Isf 1 to 40 Iob 3 hours Sees you next week Gin 3, switching to dexcom g7 Makes sure he brings everything

## 2025-04-09 ENCOUNTER — LAB VISIT (OUTPATIENT)
Dept: LAB | Facility: HOSPITAL | Age: 70
End: 2025-04-09
Payer: MEDICARE

## 2025-04-09 DIAGNOSIS — N18.32 STAGE 3B CHRONIC KIDNEY DISEASE: ICD-10-CM

## 2025-04-09 LAB
ALBUMIN SERPL BCP-MCNC: 3.1 G/DL (ref 3.5–5.2)
ANION GAP (OHS): 11 MMOL/L (ref 8–16)
BUN SERPL-MCNC: 37 MG/DL (ref 8–23)
CALCIUM SERPL-MCNC: 8.9 MG/DL (ref 8.7–10.5)
CHLORIDE SERPL-SCNC: 101 MMOL/L (ref 95–110)
CO2 SERPL-SCNC: 25 MMOL/L (ref 23–29)
CREAT SERPL-MCNC: 2.7 MG/DL (ref 0.5–1.4)
ERYTHROCYTE [DISTWIDTH] IN BLOOD BY AUTOMATED COUNT: 16.9 % (ref 11.5–14.5)
GFR SERPLBLD CREATININE-BSD FMLA CKD-EPI: 25 ML/MIN/1.73/M2
GLUCOSE SERPL-MCNC: 183 MG/DL (ref 70–110)
HCT VFR BLD AUTO: 35.3 % (ref 40–54)
HGB BLD-MCNC: 9.9 GM/DL (ref 14–18)
MCH RBC QN AUTO: 21.4 PG (ref 27–31)
MCHC RBC AUTO-ENTMCNC: 28 G/DL (ref 32–36)
MCV RBC AUTO: 76 FL (ref 82–98)
PHOSPHATE SERPL-MCNC: 3.5 MG/DL (ref 2.7–4.5)
PLATELET # BLD AUTO: 155 K/UL (ref 150–450)
PMV BLD AUTO: ABNORMAL FL
POTASSIUM SERPL-SCNC: 3.7 MMOL/L (ref 3.5–5.1)
RBC # BLD AUTO: 4.63 M/UL (ref 4.6–6.2)
SODIUM SERPL-SCNC: 137 MMOL/L (ref 136–145)
WBC # BLD AUTO: 5.92 K/UL (ref 3.9–12.7)

## 2025-04-09 PROCEDURE — 80069 RENAL FUNCTION PANEL: CPT | Mod: HCNC

## 2025-04-09 PROCEDURE — 80197 ASSAY OF TACROLIMUS: CPT | Mod: HCNC

## 2025-04-09 PROCEDURE — 36415 COLL VENOUS BLD VENIPUNCTURE: CPT | Mod: HCNC

## 2025-04-09 PROCEDURE — 85027 COMPLETE CBC AUTOMATED: CPT | Mod: HCNC

## 2025-04-10 ENCOUNTER — CLINICAL SUPPORT (OUTPATIENT)
Dept: NEPHROLOGY | Facility: CLINIC | Age: 70
End: 2025-04-10
Payer: MEDICARE

## 2025-04-10 ENCOUNTER — DOCUMENTATION ONLY (OUTPATIENT)
Dept: INTERNAL MEDICINE | Facility: CLINIC | Age: 70
End: 2025-04-10
Payer: MEDICARE

## 2025-04-10 ENCOUNTER — NUTRITION (OUTPATIENT)
Dept: DIABETES | Facility: CLINIC | Age: 70
End: 2025-04-10
Payer: MEDICARE

## 2025-04-10 VITALS
OXYGEN SATURATION: 97 % | HEART RATE: 76 BPM | DIASTOLIC BLOOD PRESSURE: 83 MMHG | SYSTOLIC BLOOD PRESSURE: 140 MMHG | BODY MASS INDEX: 27.42 KG/M2 | WEIGHT: 202.19 LBS

## 2025-04-10 DIAGNOSIS — D63.8 ANEMIA OF CHRONIC DISEASE: Primary | ICD-10-CM

## 2025-04-10 DIAGNOSIS — N18.31 TYPE 2 DIABETES MELLITUS WITH STAGE 3A CHRONIC KIDNEY DISEASE, WITH LONG-TERM CURRENT USE OF INSULIN: Primary | ICD-10-CM

## 2025-04-10 DIAGNOSIS — Z79.4 TYPE 2 DIABETES MELLITUS WITH STAGE 3A CHRONIC KIDNEY DISEASE, WITH LONG-TERM CURRENT USE OF INSULIN: Primary | ICD-10-CM

## 2025-04-10 DIAGNOSIS — E11.22 TYPE 2 DIABETES MELLITUS WITH STAGE 3A CHRONIC KIDNEY DISEASE, WITH LONG-TERM CURRENT USE OF INSULIN: Primary | ICD-10-CM

## 2025-04-10 LAB — TACROLIMUS BLD-MCNC: 7.6 NG/ML (ref 5–15)

## 2025-04-10 PROCEDURE — 99999 PR PBB SHADOW E&M-EST. PATIENT-LVL II: CPT | Mod: PBBFAC,HCNC,,

## 2025-04-10 RX ORDER — INSULIN PMP CART,AUT,G6/7,CNTR
EACH SUBCUTANEOUS
Qty: 45 EACH | Refills: 3 | Status: SHIPPED | OUTPATIENT
Start: 2025-04-10

## 2025-04-10 NOTE — PROGRESS NOTES
Mr. Zamorano is a patient of Dr. Bowden and was last seen in clinic 1/7/2025.      Subjective:   Patient ID:  Ravi Zamorano is a 69 y.o. male who presents for follow up of Atrial Fibrillation  .     HPI:    Mr. Zamorano is a 69 y.o. male with DM, kidney tx (2016), HFpEF, AF/AFL (PVI/PWI/CTI 6/2024), CVA (12/7/2024) here for follow up after cardioversion.    Background:    Ravi Zamorano has a history of DM on insulin, CKD2, history of kidney transplant in 2016 (Cr 1.5 in 2/2021), and diastolic dysfunction, who was diagnosed with rate controlled AF in 8/2019, manifesting as worsening DOSS. An echo done in 8/2019 showed an EF of 55% and severe LAE (MINA 54.3 mL/m2). He underwent DCCV in 8/2010, and was started on flecainide 100 mg bid post-procedure.    An echo in 6/2021 showed an EF of 55%. A 2 week [place] DX monitor done in 7/2019 showed no arrhythmias.    At his 4/2023 visit, he was maintaining sinus rhythm but reporting more LH and DOSS. QRS wide on ECG--flecainide reduced to 50 mg bid. By 5/2023 QRS had narrowed, although he had an episode of syncope in 5/2023 thought to be due to hypotension 2/2 chlorthalidone.      SPECT 5/15/2023 negative for ischemia or scar. Event monitor 1/2024--one episode of AF noted 12/24/2023, and episodes of SR and junctional rhythm with HR ranging from 54-74.     At 2/2024 pt was in sinus rhythm. Reported chronic DOSS. Episodes of LH when hypotensive. Creatinine of 1.7 in 11/2023.  ms.    Pt seen by cardiology in 4/2024--back in AF. Presented to ED in 5/2024 with rate controlled AF and worsening HF sx..     Admitted in 4/2024 for anemia (Hg 5.7). GI thought it was low likelihood this was 2/2 GIB. Awaiting outpatient hematology input. Back on eliquis. Hg stable over the past month or so.    ADHF admission on 5/2024.     5/6/2024: In summary, Ravi Zamorano is a 68 year old male with persistent AF, who underwent DCCV and flecainide initiation in 8/2019. Over the past several  months he has had recurrent symptomatic AF.  Plan for PVI. Hold eliquis AM of procedure. FELICIANO--cx if sinus. Post-procedure will initiate a brief course of antiarrhythmics. Will also check for stable Hg in the weeks leading up to procedure, and postpone if Hg drifts down or if work-up identifies a source/problem that needs to be addressed.    6/11/2024: DCCV prior to ablation. PVI. LA posterior wall isolation. CTI-line.    9/19/2024: Pt is 3 mo s/p PVI/PWI/CTI-line. He is doing well from a rhythm standpoint, with no documented or symptomatic recurrence of arrhythmia since procedure.  He recently stopped his multaq. Not on xarelto due to expense. Discussed that his CHADSVASc is 4 and it is important to restart OAC for CVA prophylaxis. He does not want to start warfarin.  Xarelto prescription is at specialty pharmacy. He and his wife agreed to go downstairs and discuss available options to get him through the donut hole. Alternative he would need to go on warfarin.  His BP is very high (188/70). He says it was low this morning. Has not taken any of his BP meds today. Advised him to take his meds as soon as he gets home and monitor for appropriate BP decrease. ED if SBP>185 despite meds. He follows with nephrology.    Patient was admitted 12/7/24 to 12/8/24 with left sided weakness. Found to have subcentimeter embolic right MCA infarct.     1/7/2025: Pt back in AFL identified in cardiology clinic 12/17/24. Possibly symptomatic reporting DOSS. Had to stop eliquis due to cost but is compliant with xarelto (renal dose). Recent hospitalization for CVA with minimal-to-no residual deficits.  Will proceed with FELICIANO/DCCV with longer term plan for ablation. Do not sedate for 6 weeks following CVA      Update (04/16/2025):    2/3/2025: Cardioversion was successfully performed with restoration of normal sinus rhythm.     2/20/2025: Admitted to Hospital Medicine on 2/20 with syncope and bradycardia. EP consulted; recommending holding  BB. Hospital course complicated by PHUONG, possibly ATN. KTM consulted, ARB held and he was started on IVFs with   stabilization of Cr. KTM okay with discharging patient home, as well as resuming HCTZ and lasix prn. Plan for cardiac event monitor as an outpatient along with EP follow-up.     2/21/2025: Event Monitor  There were 22 auto triggered events and 4 patient triggered events (no sx specified) over the 30 day monitoring time. Corresponding strips show AF  bpm. On 3/14/2025 at 826 AM pt had several runs of VT up to 216 bpm, longest 125 seconds. Runs of NSVT on 2/25/2025 and 3/16/2025.     3/16/2025: Admitted for PNA. KTM consulted for tacro dosing as well as PHUONG; resumed prednisone & held MMF. Started on empiric vanc & pip-tazo. MRSA -ve; d/c'd vanc. Clinically improved; transitioned pip-tazo to PO levofloxacin (renally dosed) to complete 7 day course (EED 03/22). Kidney transplant team monitoring and adjusting tacro levels- will set up follow-up appt upon d/c. Hospital course c/b about 5 watery stools- Cdiff PCR/Ag positive; toxins negative. Patient is AF and WBC wnl. No further episodes of diarrhea in over 12 hours. However, since patient is immunocompromised and on antibiotic treatment for pneumonia, we will treat with 10 day course of oral vancomycin.     3/24/2025: Spoke with patient regarding patient activated alert on 30 day monitor on 3/21/25 at 1231.  States he did not press the button and did not feel symptomatic that day.      Today he says he is feeling significantly better. No LH or syncope. No significant DOSS. No palps. No CP.    He is currently taking xarelto 15mg daily for stroke prophylaxis and denies significant bleeding episodes.  Kidney function is low, with a creatinine of 2.7 on 4/9/2025.    I have personally reviewed the patient's EKG today, which shows likely atypical AFL with LVH and occ PVC at 79bpm. QRS is 128. QTc is 442.    Relevant Cardiac Test Results:    2D Echo  (2/21/2025):    Left Ventricle: There is mild concentric hypertrophy. There is low normal systolic function with a visually estimated ejection fraction of 50 - 55%. Grade III diastolic dysfunction.    Left Ventricle: The left ventricle is at the upper limits of normal in size. Mildly increased wall thickness. There is mild concentric hypertrophy. Regional wall motion abnormalities present. See diagram for wall motion findings. There is low normal systolic function with a visually estimated ejection fraction of 50 - 55%. Ejection fraction is approximately 53%. Grade III diastolic dysfunction.    Right Ventricle: Systolic function is borderline low despite the low TAPSE.    Left Atrium: Left atrium is moderately dilated.    Aortic Valve: The aortic valve is a trileaflet valve. There is mild aortic valve sclerosis. There is moderate annular calcification present. There is mild aortic regurgitation with a centrally directed jet.    Mitral Valve: There is mild regurgitation with a centrally directed jet.    Pericardium: There is a trivial posterior effusion and under the RA.    Tricuspid Valve: There is mild regurgitation.    Pulmonary Artery: The estimated pulmonary artery systolic pressure is 30 mmHg.    Current Outpatient Medications   Medication Sig    atorvastatin (LIPITOR) 80 MG tablet Take 1 tablet (80 mg total) by mouth once daily.    blood sugar diagnostic Strp Use to check blood glucose 1 times daily, to use with insurance preferred meter    blood-glucose meter Misc Use to check blood glucose 1 times daily, to use with insurance preferred meter    blood-glucose sensor (DEXCOM G7 SENSOR) Kayla Change sensor every 10 days.    doxazosin (CARDURA) 4 MG tablet Take 1 tablet (4 mg total) by mouth once daily.    empagliflozin (JARDIANCE) 10 mg tablet Take 1 tablet (10 mg total) by mouth once daily.    ergocalciferol (ERGOCALCIFEROL) 50,000 unit Cap Take 1 capsule (50,000 Units total) by mouth every 7 days.     "furosemide (LASIX) 40 MG tablet Take 2 tablets (80 mg total) by mouth daily as needed (for swelling).    gabapentin (NEURONTIN) 300 MG capsule Take 1 capsule by mouth in the morning, 1 capsule midday, and 3 capsules at night.    hydrALAZINE (APRESOLINE) 100 MG tablet Take 1 tablet (100 mg total) by mouth every 8 (eight) hours.    hydroCHLOROthiazide 12.5 MG Tab Take 1 tablet (12.5 mg total) by mouth once daily.    insulin aspart U-100 (NOVOLOG U-100 INSULIN ASPART) 100 unit/mL injection Use in pump, max daily 100 units.    insulin aspart U-100 (NOVOLOG) 100 unit/mL (3 mL) InPn pen Inject 10 units under the skin w/ breakfast, 7 units at lunch and dinner. Scale 180-230+2, 231-280+4, 281-330+6, 331-380+8, >380+10.  Max daily 57 units.    insulin glargine U-100, Lantus, (LANTUS SOLOSTAR U-100 INSULIN) 100 unit/mL (3 mL) InPn pen Inject 20 Units into the skin every morning.    insulin  cart,aut,G6/7,cntr (OMNIPOD 5 G6-G7 INTRO KT,GEN5,) Crtg Use as directed.    insulin pump cart,auto,BT,G6/7 (OMNIPOD 5 G6-G7 PODS, GEN 5,) Crtg Change every 48 hours.    Lactobacillus rhamnosus GG (CULTURELLE) 10 billion cell capsule Take 1 capsule by mouth once daily.    lancets 30 gauge Misc Use to check blood glucose 1 times daily, to use with insurance preferred meter    magnesium oxide (MAG-OX) 400 mg (241.3 mg magnesium) tablet Take 1 tablet (400 mg total) by mouth 2 (two) times daily.    meclizine (ANTIVERT) 25 mg tablet Take 1 tablet (25 mg total) by mouth 3 (three) times daily as needed for Dizziness.    [Paused] mycophenolate (CELLCEPT) 250 mg Cap Take 2 capsules (500 mg total) by mouth 2 (two) times daily.    pen needle, diabetic (BD ULTRA-FINE LO PEN NEEDLE) 32 gauge x 5/32" Ndle USE TO ADMINISTER INSULIN 4 TIMES DAILY.    polyethylene glycol (MIRALAX) 17 gram PwPk Take 17 gm (mixed in liquid) by mouth every day.    predniSONE (DELTASONE) 5 MG tablet Take 1 tablet (5 mg total) by mouth once daily.    rivaroxaban (XARELTO) " 15 mg Tab Take 1 tablet (15 mg total) by mouth daily with dinner or evening meal.    tacrolimus (PROGRAF) 1 MG Cap Take 1 capsule (1 mg total) by mouth every 12 (twelve) hours.    valsartan (DIOVAN) 320 MG tablet Take 1 tablet (320 mg total) by mouth once daily.     Current Facility-Administered Medications   Medication    epoetin tanya injection 4,000 Units     Facility-Administered Medications Ordered in Other Visits   Medication    balanced salt irrigation intra-ocular solution 1 drop    phenylephrine HCL 10% ophthalmic solution 1 drop    proparacaine 0.5 % ophthalmic solution 1 drop    sodium chloride 0.9% flush 10 mL    TETRAcaine HCl (PF) 0.5 % Drop 1 drop       Review of Systems   Constitutional: Negative for malaise/fatigue.   Cardiovascular:  Positive for syncope. Negative for chest pain, dyspnea on exertion, irregular heartbeat, leg swelling and palpitations.   Respiratory:  Negative for shortness of breath.    Hematologic/Lymphatic: Negative for bleeding problem.   Skin:  Negative for rash.   Musculoskeletal:  Negative for myalgias.   Gastrointestinal:  Negative for hematemesis, hematochezia and nausea.   Genitourinary:  Negative for hematuria.   Neurological:  Negative for light-headedness.   Psychiatric/Behavioral:  Negative for altered mental status.    Allergic/Immunologic: Negative for persistent infections.       Objective:          /80 (Patient Position: Sitting)   Pulse 79   Ht 6' (1.829 m)   Wt 91.9 kg (202 lb 9.6 oz)   BMI 27.48 kg/m²     Physical Exam  Vitals and nursing note reviewed.   Constitutional:       Appearance: Normal appearance. He is well-developed.   HENT:      Head: Normocephalic.      Nose: Nose normal.   Eyes:      Pupils: Pupils are equal, round, and reactive to light.   Cardiovascular:      Rate and Rhythm: Normal rate. Rhythm irregularly irregular.   Pulmonary:      Effort: No respiratory distress.   Musculoskeletal:         General: Normal range of motion.   Skin:      General: Skin is warm and dry.      Findings: No erythema.   Neurological:      Mental Status: He is alert and oriented to person, place, and time.   Psychiatric:         Speech: Speech normal.         Behavior: Behavior normal.           Lab Results   Component Value Date     04/09/2025     (L) 03/19/2025    K 3.7 04/09/2025    K 3.6 03/19/2025    MG 2.2 03/19/2025    BUN 37 (H) 04/09/2025    CREATININE 2.7 (H) 04/09/2025    ALT <5 (L) 03/19/2025    AST 11 03/19/2025    HGB 9.9 (L) 04/09/2025    HGB 8.7 (L) 03/19/2025    HCT 35.3 (L) 04/09/2025    HCT 29.4 (L) 03/19/2025    HCT 31.5 03/16/2025    TSH 0.999 03/17/2025    LDLCALC 105.4 12/07/2024       Recent Labs   Lab 04/01/24  1800 06/04/24  0901 12/07/24  0629 01/27/25  1005   INR 1.1 1.2 1.3 H 1.3 H       Assessment:     1. Persistent atrial fibrillation    2. Chronic combined systolic (congestive) and diastolic (congestive) heart failure    3. Anticoagulant long-term use    4. History of CVA (cerebrovascular accident)    5. Long term (current) use of anticoagulants    6. Syncope and collapse      Plan:     In summary, Mr. Zamorano is a 69 y.o. male with DM, kidney tx (2016), HFpEF, AF/AFL (PVI/PWI/CTI 6/2024), CVA (12/7/2024) here for follow up after cardioversion.  He is 2 mo s/p DCCV. Admitted to hospital a few weeks later with syncope, bradycardia, and PHUONG. Carvedilol 25mg BID stopped. Event monitor shows pAF/AFL and NSVT and several runs of VT, longest 125 seconds. Sustained VT occurred 3/14/25 and he was admitted to the hospital with PNA 3/16/25. He denies any syncope while wearing loop. Suspect VT secondary to electrolyte derangement (K 2.8 on 3/16/25) + acute infection. ECG today shows likely atypical AFL with controlled rate. Recommend redo ablation. Risks, benefits, and alternatives discussed. He would like to proceed. He remains on xarelto at renal dose for CVA prophylaxis.  We also discussed possibility of loop recorder in the future  given VT seen on event monitor and hx of syncope. However, his VT was likely provoked and he denies LH or syncope during longest VT episode. Will discuss with Dr. Bowden. (UPDATE: OK to defer ILR for now)    Redo PVI/AFL RFA  Continue current meds  RTC as scheduled    *A copy of this note has been sent to Dr. Bowden*    Follow up as scheduled.      ------------------------------------------------------------------    REYES Flynn, NP-C  Cardiac Electrophysiology

## 2025-04-15 NOTE — PROGRESS NOTES
Diabetes Care Specialist Progress Note  Author: Argelia Bridges RD  Date: 4/15/2025    Intake    Program Intake  Reason for Diabetes Program Visit:: Intervention  Type of Intervention:: Individual  Individual: Device Training  Device Training: Insulin Pump Start  Current diabetes risk level:: moderate  In the last month, have you used the ER or been admitted to the hospital: Yes  Was the ER or hospital admission related to diabetes?: No  Permission to speak with others about care:: yes (spouse present at visit)    Current Diabetes Treatment: Insulin  Method of insulin delivery?: Injections, Insulin Pump  Type of Pump: Pt trained on Omnipod 5 during appt  Any problems obtaining supplies?: No    Continuous Glucose Monitoring  Patient has CGM: Yes  Personal CGM type:: Dexcom G7    Lab Results   Component Value Date    HGBA1C 8.3 (H) 03/27/2025       There is no height or weight on file to calculate BMI.    Lifestyle Coping Support & Clinical    Lifestyle/Coping/Support  Psychosocial/Coping Skills Assessment Completed: : No  Deffered due to:: Time  Area of need?: Deferred    Problem Review  Active Comorbidities: Neuropathy, Hypertension, Hyperlipidemia/Dyslipidemia, Cardiovascular Disease, Chronic Kidney Disease    Diabetes Self-Management Skills Assessment    Medication Skills Assessment  Patient is able to identify current diabetes medications, dosages, and appropriate timing of medications.: yes  Medication Skills Assessment Completed:: Yes  Assessment indicates:: Knowledge deficit, Instruction Needed  Area of need?: Yes    Diabetes Disease Process/Treatment Options  Diabetes Disease Process/Treatment Options: Skills Assessment Completed: No  Deferred due to:: Time  Area of need?: Deferred    Nutrition/Healthy Eating  Meal Plan 24 Hour Recall - Breakfast: 2 biscuits with butter and SF syrup  Meal Plan 24 Hour Recall - Lunch: sometimes skips  Meal Plan 24 Hour Recall - Dinner: 1/2 chicken sandwich, onion rings  Meal  Plan 24 Hour Recall - Snack: minimal, sometimes oreo thins  Assessment indicates:: Knowledge deficit, Instruction Needed  Area of need?: Yes    Physical Activity/Exercise  Physical Activity/Exercise Skills Assessment Completed: : No  Deffered due to:: Time  Area of need?: Deferred    Home Blood Glucose Monitoring  Patient states that blood sugar is checked at home daily.: yes  Monitoring Method:: personal continuous glucose monitor  Personal CGM type:: Dexcom G7  Home Blood Glucose Monitoring Skills Assessment Completed: : Yes  Assessment indicates:: Instruction Needed  Area of need?: Yes    Acute Complications  Acute Complications Skills Assessment Completed: : No  Deffered due to:: Time  Area of need?: Deferred    Chronic Complications  Reviewed health maintenance: yes  Chronic Complications Skills Assessment Completed: : No  Deferred due to:: Time  Area of need?: Deferred    Assessment Summary and Plan    Based on today's diabetes care assessment, the following areas of need were identified:      Identified Areas of Need      Medication/Current Diabetes Treatment: Yes, see care plan.   Lifestyle Coping/Support: Deferred   Diabetes Disease Process/Treatment Options: Deferred   Nutrition/Healthy Eating: Yes , pt previously educated on.    Physical Activity/Exercise: Deferred    Home Blood Glucose Monitoring: Yes , see care plan.   Acute Complications: Deferred    Chronic Complications: Deferred     Today's interventions were provided through individual discussion, instruction, and written materials were provided.      Patient verbalized understanding of instruction and written materials.  Pt was able to return back demonstration of instructions today. Patient understood key points, needs reinforcement and further instruction.     Diabetes Self-Management Care Plan:    Today's Diabetes Self-Management Care Plan was developed with Ravi's input. Ravi has agreed to work toward the following goal(s) to improve  his/her overall diabetes control.      Care Plan: Diabetes Management   Updates made since 4/15/2024 12:00 AM        Problem: Medications         Goal: Pt agrees to review insulin pumps discussed during appt to determine which insulin pump suits pt heri best. Completed 4/10/2025   Start Date: 2/10/2025   Expected End Date: 3/10/2025   This Visit's Progress: Met   Recent Progress: Deferred   Priority: High   Barriers: Knowledge deficit   Note:    Patient is interested in insulin pump therapy.     Insulin Pump Evaluation Check List:      Introduction to Insulin Pump Therapy:  Reviewed basics of insulin pump therapy    Encouraged patient to check with their insurance provider regarding information such as possible costs associated with initial and monthly pump costs.      Carbohydrate Counting Skills Evaluation:   Reviewed basic Carbohydrate Counting principles--Food groups, label reading, use of dry measuring cups for accurate portion sizes.      Insulin Pump Options:   Reviewed major insulin pump types:   Medtronic  Tandem: T-slim X 2, Mobi  Insulet:  Omni Pod 5  iLet pump  Discussed unique features of each pump.   Patient was able to view and practice actual device use--using demo pumps and virtual pumps  Discussed the integration of CGM into the pumps to provide automatic adjustments based on the CGM data         Problem: Healthy Eating         Goal: Pt agrees to look at food labels for total grams of carbohydrates and serving size to prepare for carb counting with insulin pump.    Start Date: 2/10/2025   Expected End Date: 5/10/2025   This Visit's Progress: Deferred   Priority: Medium   Barriers: Knowledge deficit   Note:    Reviewed basic Carbohydrate Counting principles--Food groups, label reading, use of dry measuring cups for accurate portion sizes       Task: Reviewed the sources and role of Carbohydrate, Protein, and Fat and how each nutrient impacts blood sugar. Completed 2/10/2025        Task:  Explained how to count carbohydrates using the food label and the use of dry measuring cups for accurate carb counting. Completed 2/10/2025        Task: Review the importance of balancing carbohydrates with each meal using portion control techniques to count servings of carbohydrate and label reading to identify serving size and amount of total carbs per serving. Completed 2/10/2025        Problem: Medications         Goal: Patient Agrees to use Omnipod 5 as prescribed.    Start Date: 4/10/2025   Expected End Date: 7/15/2025   This Visit's Progress: Deferred   Priority: High   Barriers: Knowledge deficit   Note:    OMNIPOD 5 INSULIN PUMP START Patient is here today for insulin pump training and will be starting on an Omni Pod 5 Insulin pump.     Patient demonstrated the ability to fill pod reservoir with 200 units, prime infusion set and insert pod per sterile technique.  Instructed pt on use of basic pump features. Reviewed site selection of pods, rotation of sites and hard stop on controller to change every 72 hrs.  Reviewed treatment of hypoglycemia, hyperglycemia.  Reviewed features available in manual mode verses automated mode.   Educated pt on how to switch from automated mode to manual mode.  Patient demonstrated ability to program Dexcom transmitter into controller and start automated limited mode.  Pt Dexcom connected to controller during appt.     INITIAL SETTINGS :     Bolus Menu:  Pump Settings per NP note  0.6 u/hr mn-0600 , 9p-mn, 0.7 u/hr 6a-9p   Target 120-130 mg/dl   Iob 3 hours  Max bolus 20 units   Max basal 2 u/hr   Isf 40 mn-mn  Icr 1 to 7.5     Presets:  Snack: 4 units (28g)  Small Meal: 8 units (56g)  Medium Meal: 12 units (90g)  Large Meal: 15 units (114g)  Super Large Meal: 18 units (130g)     Patient was given time to practice on simulator entering carbs and glucose into pump     Podder/Glooko account information added in specialty comments.       Problem: Blood Glucose Self-Monitoring          Goal: Patient agrees to check and record blood sugars using Dexcom G7.    Start Date: 4/10/2025   Expected End Date: 5/15/2025   This Visit's Progress: Deferred   Priority: Medium   Barriers: Knowledge deficit   Note:    DEXCOM G7 CGM TRAINING     Patient referred to clinic today for Dexcom G7 continuous glucose sensor system training.  Dexcom G7 mobile marion was downloaded to phone.     Pt will be using his phone as the primary .  Overview:  5min glucose reading updates, trending arrows, BG graph screens, battery life indicator, Blue Tooth Symbol.  Menus: trend Graph, start sensor, enter BG, events, Alerts, Settings, Shutdown, Stop Sensor   Settings:                          * Urgent Low: 55 mg/dL                          * Urgent Low Soon: on                          * Low alert: 70 mg/dl                           * High alert: 250 mg/dl                          * Rise rate: off                          * Fall Rate: off                          * Signal Loss: on repeat 20 min                          * No Reading: on repeat 20 min                          * Always sound: on                          * Alert Schedule:  (instructed on how to set up alert schedule if desired)     Reviewed additional setting options with patient, including Graph Height.  Discussed no need to calibrate sensor during the entirety of the 10 day wear.  Reviewed sensor site selection. Site selected and prepped using aseptic technique.  Patient able to demonstrate without difficulty.  Encouraged to review manual prior to starting another sensor.   Dexcom technical support contact number given and examples of when to contact them discussed.    Advised to remove for CT, MRI, xrays  Advised sensor can get wet for bathing etc.  Should readings do not match symptoms to double check with glucometer.        Follow Up Plan     Follow up in about 1 week (around 4/17/2025) for Omnipod 5 F/U.    Today's care plan and follow up  schedule was discussed with patient.  Ravi verbalized understanding of the care plan, goals, and agrees to follow up plan.        The patient was encouraged to communicate with his/her health care provider/physician and care team regarding his/her condition(s) and treatment.  I provided the patient with my contact information today and encouraged to contact me via phone or [facility] Patient Portal as needed.     Length of Visit   Total Time: 90 Minutes

## 2025-04-16 ENCOUNTER — OFFICE VISIT (OUTPATIENT)
Dept: ELECTROPHYSIOLOGY | Facility: CLINIC | Age: 70
End: 2025-04-16
Payer: MEDICARE

## 2025-04-16 ENCOUNTER — HOSPITAL ENCOUNTER (OUTPATIENT)
Dept: CARDIOLOGY | Facility: CLINIC | Age: 70
Discharge: HOME | End: 2025-04-16
Payer: MEDICARE

## 2025-04-16 ENCOUNTER — NUTRITION (OUTPATIENT)
Dept: DIABETES | Facility: CLINIC | Age: 70
End: 2025-04-16
Payer: MEDICARE

## 2025-04-16 VITALS
WEIGHT: 202.63 LBS | DIASTOLIC BLOOD PRESSURE: 80 MMHG | HEIGHT: 72 IN | BODY MASS INDEX: 27.44 KG/M2 | SYSTOLIC BLOOD PRESSURE: 138 MMHG | HEART RATE: 79 BPM

## 2025-04-16 DIAGNOSIS — I48.19 PERSISTENT ATRIAL FIBRILLATION: Primary | ICD-10-CM

## 2025-04-16 DIAGNOSIS — Z79.01 LONG TERM (CURRENT) USE OF ANTICOAGULANTS: ICD-10-CM

## 2025-04-16 DIAGNOSIS — E11.22 TYPE 2 DIABETES MELLITUS WITH STAGE 3A CHRONIC KIDNEY DISEASE, WITH LONG-TERM CURRENT USE OF INSULIN: Primary | ICD-10-CM

## 2025-04-16 DIAGNOSIS — N18.31 TYPE 2 DIABETES MELLITUS WITH STAGE 3A CHRONIC KIDNEY DISEASE, WITH LONG-TERM CURRENT USE OF INSULIN: Primary | ICD-10-CM

## 2025-04-16 DIAGNOSIS — I50.42 CHRONIC COMBINED SYSTOLIC (CONGESTIVE) AND DIASTOLIC (CONGESTIVE) HEART FAILURE: ICD-10-CM

## 2025-04-16 DIAGNOSIS — Z86.73 HISTORY OF CVA (CEREBROVASCULAR ACCIDENT): ICD-10-CM

## 2025-04-16 DIAGNOSIS — Z79.01 ANTICOAGULANT LONG-TERM USE: ICD-10-CM

## 2025-04-16 DIAGNOSIS — R55 SYNCOPE AND COLLAPSE: ICD-10-CM

## 2025-04-16 DIAGNOSIS — Z79.4 TYPE 2 DIABETES MELLITUS WITH STAGE 3A CHRONIC KIDNEY DISEASE, WITH LONG-TERM CURRENT USE OF INSULIN: Primary | ICD-10-CM

## 2025-04-16 DIAGNOSIS — I48.19 PERSISTENT ATRIAL FIBRILLATION: ICD-10-CM

## 2025-04-16 LAB
OHS QRS DURATION: 128 MS
OHS QTC CALCULATION: 442 MS

## 2025-04-16 PROCEDURE — 1159F MED LIST DOCD IN RCRD: CPT | Mod: HCNC,CPTII,S$GLB, | Performed by: NURSE PRACTITIONER

## 2025-04-16 PROCEDURE — 1101F PT FALLS ASSESS-DOCD LE1/YR: CPT | Mod: HCNC,CPTII,S$GLB, | Performed by: NURSE PRACTITIONER

## 2025-04-16 PROCEDURE — 3075F SYST BP GE 130 - 139MM HG: CPT | Mod: HCNC,CPTII,S$GLB, | Performed by: NURSE PRACTITIONER

## 2025-04-16 PROCEDURE — G0108 DIAB MANAGE TRN  PER INDIV: HCPCS | Mod: HCNC,S$GLB,,

## 2025-04-16 PROCEDURE — 3052F HG A1C>EQUAL 8.0%<EQUAL 9.0%: CPT | Mod: HCNC,CPTII,S$GLB, | Performed by: NURSE PRACTITIONER

## 2025-04-16 PROCEDURE — 3288F FALL RISK ASSESSMENT DOCD: CPT | Mod: HCNC,CPTII,S$GLB, | Performed by: NURSE PRACTITIONER

## 2025-04-16 PROCEDURE — 1111F DSCHRG MED/CURRENT MED MERGE: CPT | Mod: HCNC,CPTII,S$GLB, | Performed by: NURSE PRACTITIONER

## 2025-04-16 PROCEDURE — 1160F RVW MEDS BY RX/DR IN RCRD: CPT | Mod: HCNC,CPTII,S$GLB, | Performed by: NURSE PRACTITIONER

## 2025-04-16 PROCEDURE — 93005 ELECTROCARDIOGRAM TRACING: CPT | Mod: HCNC,S$GLB,, | Performed by: INTERNAL MEDICINE

## 2025-04-16 PROCEDURE — 1126F AMNT PAIN NOTED NONE PRSNT: CPT | Mod: HCNC,CPTII,S$GLB, | Performed by: NURSE PRACTITIONER

## 2025-04-16 PROCEDURE — 93010 ELECTROCARDIOGRAM REPORT: CPT | Mod: HCNC,S$GLB,, | Performed by: INTERNAL MEDICINE

## 2025-04-16 PROCEDURE — 99999 PR PBB SHADOW E&M-EST. PATIENT-LVL I: CPT | Mod: PBBFAC,HCNC,,

## 2025-04-16 PROCEDURE — 99214 OFFICE O/P EST MOD 30 MIN: CPT | Mod: HCNC,S$GLB,, | Performed by: NURSE PRACTITIONER

## 2025-04-16 PROCEDURE — 99999 PR PBB SHADOW E&M-EST. PATIENT-LVL V: CPT | Mod: PBBFAC,HCNC,, | Performed by: NURSE PRACTITIONER

## 2025-04-16 PROCEDURE — 3079F DIAST BP 80-89 MM HG: CPT | Mod: HCNC,CPTII,S$GLB, | Performed by: NURSE PRACTITIONER

## 2025-04-16 PROCEDURE — 3008F BODY MASS INDEX DOCD: CPT | Mod: HCNC,CPTII,S$GLB, | Performed by: NURSE PRACTITIONER

## 2025-04-16 PROCEDURE — 4010F ACE/ARB THERAPY RXD/TAKEN: CPT | Mod: HCNC,CPTII,S$GLB, | Performed by: NURSE PRACTITIONER

## 2025-04-16 NOTE — PROGRESS NOTES
Hgb 9.9/ Hct 35.3     3 rd Dose Retacrit 4000 units .Per Form # [id number]. Pt was educated on mechanism of action of medication and possible side effects. Handout was given also.All concerns and questions was addressed. Pt was ask to wait 15 min after administration ,tolerated well, no reactions noted. Pt was given 2 week appt.      GFR 25

## 2025-04-16 NOTE — Clinical Note
Can we schedule pt for redo ablation? (If they ask about a loop you can say Dr. Bowden is ok deferring that for now.)Thanks!

## 2025-04-16 NOTE — Clinical Note
Pt s/p PVI/PWI/CTI 6/2024. Underwent DCCV in Feb. Recurrent AF/AFL on event monitor. Carvedilol stopped secondary to bradycardia/hypotension/syncope. Pt would like to proceed with redo ablation if you agree.  (ALSO, sustained VT (longest 2 minutes) identified on event monitor but this was in the context of electrolyte derangement and PNA, and he denies syncope while wearing monitor. Just wanted to confirm you are ok deferring loop. Thx)

## 2025-04-17 NOTE — PROGRESS NOTES
Hgb 9.9/ Hct 35.3     Retacrit 4000 units .Per Form # [id number]. Pt was educated on mechanism of action of medication and possible side effects. Handout was given also.All concerns and questions was addressed. Pt was ask to wait 15 min after administration ,tolerated well, no reactions noted. Pt was given 2 week appt.      GFR 25

## 2025-04-21 NOTE — PROGRESS NOTES
Diabetes Care Specialist Progress Note  Author: Argelia Bridges RD  Date: 4/21/2025    Intake    Program Intake  Reason for Diabetes Program Visit:: Intervention  Type of Intervention:: Individual  Individual: Education  Education: Self-Management Skill Review, Pattern Management  Current diabetes risk level:: moderate  In the last month, have you used the ER or been admitted to the hospital: Yes  Was the ER or hospital admission related to diabetes?: No  Permission to speak with others about care:: yes (spouse present at visit)    Current Diabetes Treatment: Insulin  Method of insulin delivery?: Injections, Insulin Pump  Type of Pump: Omnipod 5  Any problems obtaining supplies?: No    Continuous Glucose Monitoring  Patient has CGM: Yes  Personal CGM type:: Dexcom G7    Lab Results   Component Value Date    HGBA1C 8.3 (H) 03/27/2025       There is no height or weight on file to calculate BMI.    Lifestyle Coping Support & Clinical    Lifestyle/Coping/Support  Psychosocial/Coping Skills Assessment Completed: : No  Deffered due to:: Time  Area of need?: Deferred    Problem Review  Active Comorbidities: Neuropathy, Hypertension, Hyperlipidemia/Dyslipidemia, Cardiovascular Disease, Chronic Kidney Disease    Diabetes Self-Management Skills Assessment    Medication Skills Assessment  Patient is able to identify current diabetes medications, dosages, and appropriate timing of medications.: yes  Medication Skills Assessment Completed:: Yes  Assessment indicates:: Instruction Needed  Area of need?: Yes    Diabetes Disease Process/Treatment Options  Diabetes Disease Process/Treatment Options: Skills Assessment Completed: No  Deferred due to:: Time  Area of need?: Deferred    Nutrition/Healthy Eating  Meal Plan 24 Hour Recall - Breakfast: 2 biscuits with butter and SF syrup  Meal Plan 24 Hour Recall - Lunch: sometimes skips  Meal Plan 24 Hour Recall - Dinner: 1/2 chicken sandwich, onion rings  Meal Plan 24 Hour Recall - Snack:  minimal, sometimes oreo thins  Assessment indicates:: Knowledge deficit, Instruction Needed  Area of need?: Yes    Physical Activity/Exercise  Physical Activity/Exercise Skills Assessment Completed: : No  Deffered due to:: Time  Area of need?: Deferred    Home Blood Glucose Monitoring  Patient states that blood sugar is checked at home daily.: yes  Monitoring Method:: personal continuous glucose monitor  Personal CGM type:: Dexcom G7   What is your current Time in Range?: 72%  Home Blood Glucose Monitoring Skills Assessment Completed: : Yes  Assessment indicates:: Instruction Needed  Area of need?: Yes    Acute Complications  Acute Complications Skills Assessment Completed: : No  Deffered due to:: Time  Area of need?: Deferred    Chronic Complications  Reviewed health maintenance: yes  Chronic Complications Skills Assessment Completed: : No  Deferred due to:: Time  Area of need?: Deferred    Assessment Summary and Plan    Based on today's diabetes care assessment, the following areas of need were identified:      Identified Areas of Need      Medication/Current Diabetes Treatment: Yes, see care plan.   Lifestyle Coping/Support: Deferred   Diabetes Disease Process/Treatment Options: Deferred   Nutrition/Healthy Eating: Yes , see care plan.   Physical Activity/Exercise: Deferred    Home Blood Glucose Monitoring: Yes , see care plan.   Acute Complications: Deferred    Chronic Complications: Deferred     Today's interventions were provided through individual discussion, instruction, and written materials were provided.      Patient verbalized understanding of instruction and written materials.  Pt was able to return back demonstration of instructions today. Patient understood key points, needs reinforcement and further instruction.     Diabetes Self-Management Care Plan:    Today's Diabetes Self-Management Care Plan was developed with Ravi's input. Ravi has agreed to work toward the following goal(s) to improve  his/her overall diabetes control.      Care Plan: Diabetes Management   Updates made since 4/21/2024 12:00 AM        Problem: Medications         Goal: Pt agrees to review insulin pumps discussed during appt to determine which insulin pump suits pt heri best. Completed 4/10/2025   Start Date: 2/10/2025   Expected End Date: 3/10/2025   This Visit's Progress: Met   Recent Progress: Deferred   Priority: High   Barriers: Knowledge deficit   Note:    Patient is interested in insulin pump therapy.     Insulin Pump Evaluation Check List:      Introduction to Insulin Pump Therapy:  Reviewed basics of insulin pump therapy    Encouraged patient to check with their insurance provider regarding information such as possible costs associated with initial and monthly pump costs.      Carbohydrate Counting Skills Evaluation:   Reviewed basic Carbohydrate Counting principles--Food groups, label reading, use of dry measuring cups for accurate portion sizes.      Insulin Pump Options:   Reviewed major insulin pump types:   Medtronic  Tandem: T-slim X 2, Mobi  Insulet:  Omni Pod 5  iLet pump  Discussed unique features of each pump.   Patient was able to view and practice actual device use--using demo pumps and virtual pumps  Discussed the integration of CGM into the pumps to provide automatic adjustments based on the CGM data         Problem: Healthy Eating         Goal: Pt agrees to look at food labels for total grams of carbohydrates and serving size to prepare for carb counting with insulin pump.    Start Date: 2/10/2025   Expected End Date: 5/10/2025   This Visit's Progress: Deferred   Priority: Medium   Barriers: Knowledge deficit   Note:    Reviewed basic Carbohydrate Counting principles--Food groups, label reading, use of dry measuring cups for accurate portion sizes       Task: Reviewed the sources and role of Carbohydrate, Protein, and Fat and how each nutrient impacts blood sugar. Completed 2/10/2025        Task:  Explained how to count carbohydrates using the food label and the use of dry measuring cups for accurate carb counting. Completed 2/10/2025        Task: Review the importance of balancing carbohydrates with each meal using portion control techniques to count servings of carbohydrate and label reading to identify serving size and amount of total carbs per serving. Completed 2/10/2025        Problem: Medications         Goal: Patient Agrees to use Omnipod 5 as prescribed.    Start Date: 4/10/2025   Expected End Date: 7/15/2025   This Visit's Progress: On track   Recent Progress: Deferred   Priority: High   Barriers: Knowledge deficit   Note:    OMNIPOD 5 INSULIN PUMP START Patient is here today for insulin pump training and will be starting on an Omni Pod 5 Insulin pump.     Patient demonstrated the ability to fill pod reservoir with 200 units, prime infusion set and insert pod per sterile technique.  Instructed pt on use of basic pump features. Reviewed site selection of pods, rotation of sites and hard stop on controller to change every 72 hrs.  Reviewed treatment of hypoglycemia, hyperglycemia.  Reviewed features available in manual mode verses automated mode.   Educated pt on how to switch from automated mode to manual mode.  Patient demonstrated ability to program Dexcom transmitter into controller and start automated limited mode.  Pt Dexcom connected to controller during appt.     INITIAL SETTINGS :     Bolus Menu:  Pump Settings per NP note  0.6 u/hr mn-0600 , 9p-mn, 0.7 u/hr 6a-9p   Target 120-130 mg/dl   Iob 3 hours  Max bolus 20 units   Max basal 2 u/hr   Isf 40 mn-mn  Icr 1 to 7.5     Presets:  Snack: 4 units (28g)  Small Meal: 8 units (56g)  Medium Meal: 12 units (90g)  Large Meal: 15 units (114g)  Super Large Meal: 18 units (130g)     Patient was given time to practice on simulator entering carbs and glucose into pump     [employer]der/Glooko account information added in specialty comments.    4/16/25: Educator  encouraged pt to use higher preset if pt consuming higher carbohydrate meal.       Problem: Blood Glucose Self-Monitoring         Goal: Patient agrees to check and record blood sugars using Dexcom G7.    Start Date: 4/10/2025   Expected End Date: 5/15/2025   This Visit's Progress: On track   Recent Progress: Deferred   Priority: Medium   Barriers: No Barriers Identified   Note:    DEXCOM G7 CGM TRAINING     Patient referred to clinic today for Dexcom G7 continuous glucose sensor system training.  Dexcom G7 mobile marion was downloaded to phone.     Pt will be using his phone as the primary .  Overview:  5min glucose reading updates, trending arrows, BG graph screens, battery life indicator, Blue Tooth Symbol.  Menus: trend Graph, start sensor, enter BG, events, Alerts, Settings, Shutdown, Stop Sensor   Settings:                          * Urgent Low: 55 mg/dL                          * Urgent Low Soon: on                          * Low alert: 70 mg/dl                           * High alert: 250 mg/dl                          * Rise rate: off                          * Fall Rate: off                          * Signal Loss: on repeat 20 min                          * No Reading: on repeat 20 min                          * Always sound: on                          * Alert Schedule:  (instructed on how to set up alert schedule if desired)     Reviewed additional setting options with patient, including Graph Height.  Discussed no need to calibrate sensor during the entirety of the 10 day wear.  Reviewed sensor site selection. Site selected and prepped using aseptic technique.  Patient able to demonstrate without difficulty.  Encouraged to review manual prior to starting another sensor.   Dexcom technical support contact number given and examples of when to contact them discussed.    Advised to remove for CT, MRI, xrays  Advised sensor can get wet for bathing etc.  Should readings do not match symptoms to  double check with glucometer.     4/16/25: Pt states using Dexcom G7 as recommended.       Dexcom download/Glooko report uploaded into media manager.        Follow Up Plan     Follow up in about 2 months (around 6/16/2025) for 2-month F/U.    Today's care plan and follow up schedule was discussed with patient.  Ravi verbalized understanding of the care plan, goals, and agrees to follow up plan.        The patient was encouraged to communicate with his/her health care provider/physician and care team regarding his/her condition(s) and treatment.  I provided the patient with my contact information today and encouraged to contact me via phone or [facility] Patient Portal as needed.     Length of Visit   Total Time: 40 Minutes

## 2025-04-22 ENCOUNTER — HOSPITAL ENCOUNTER (OUTPATIENT)
Dept: RADIOLOGY | Facility: HOSPITAL | Age: 70
Discharge: HOME | End: 2025-04-22
Attending: PHYSICIAN ASSISTANT
Payer: MEDICARE

## 2025-04-22 ENCOUNTER — OFFICE VISIT (OUTPATIENT)
Dept: INTERNAL MEDICINE | Facility: CLINIC | Age: 70
End: 2025-04-22
Payer: MEDICARE

## 2025-04-22 VITALS
DIASTOLIC BLOOD PRESSURE: 82 MMHG | HEIGHT: 73 IN | OXYGEN SATURATION: 96 % | BODY MASS INDEX: 27.11 KG/M2 | WEIGHT: 204.56 LBS | HEART RATE: 105 BPM | RESPIRATION RATE: 18 BRPM | SYSTOLIC BLOOD PRESSURE: 132 MMHG

## 2025-04-22 DIAGNOSIS — E78.2 MIXED HYPERLIPIDEMIA: ICD-10-CM

## 2025-04-22 DIAGNOSIS — Z79.4 TYPE 2 DIABETES MELLITUS WITH STAGE 3A CHRONIC KIDNEY DISEASE, WITH LONG-TERM CURRENT USE OF INSULIN: ICD-10-CM

## 2025-04-22 DIAGNOSIS — E11.22 TYPE 2 DIABETES MELLITUS WITH STAGE 3A CHRONIC KIDNEY DISEASE, WITH LONG-TERM CURRENT USE OF INSULIN: ICD-10-CM

## 2025-04-22 DIAGNOSIS — Z87.01 HISTORY OF PNEUMONIA: ICD-10-CM

## 2025-04-22 DIAGNOSIS — I10 ESSENTIAL HYPERTENSION: ICD-10-CM

## 2025-04-22 DIAGNOSIS — Z12.5 SCREENING PSA (PROSTATE SPECIFIC ANTIGEN): Primary | ICD-10-CM

## 2025-04-22 DIAGNOSIS — Z94.0 STATUS POST DECEASED-DONOR KIDNEY TRANSPLANTATION: ICD-10-CM

## 2025-04-22 DIAGNOSIS — N18.31 TYPE 2 DIABETES MELLITUS WITH STAGE 3A CHRONIC KIDNEY DISEASE, WITH LONG-TERM CURRENT USE OF INSULIN: ICD-10-CM

## 2025-04-22 PROCEDURE — 4010F ACE/ARB THERAPY RXD/TAKEN: CPT | Mod: HCNC,CPTII,S$GLB, | Performed by: PHYSICIAN ASSISTANT

## 2025-04-22 PROCEDURE — 3079F DIAST BP 80-89 MM HG: CPT | Mod: HCNC,CPTII,S$GLB, | Performed by: PHYSICIAN ASSISTANT

## 2025-04-22 PROCEDURE — 3008F BODY MASS INDEX DOCD: CPT | Mod: HCNC,CPTII,S$GLB, | Performed by: PHYSICIAN ASSISTANT

## 2025-04-22 PROCEDURE — G2211 COMPLEX E/M VISIT ADD ON: HCPCS | Mod: HCNC,S$GLB,, | Performed by: PHYSICIAN ASSISTANT

## 2025-04-22 PROCEDURE — 3075F SYST BP GE 130 - 139MM HG: CPT | Mod: HCNC,CPTII,S$GLB, | Performed by: PHYSICIAN ASSISTANT

## 2025-04-22 PROCEDURE — 3052F HG A1C>EQUAL 8.0%<EQUAL 9.0%: CPT | Mod: HCNC,CPTII,S$GLB, | Performed by: PHYSICIAN ASSISTANT

## 2025-04-22 PROCEDURE — 1160F RVW MEDS BY RX/DR IN RCRD: CPT | Mod: HCNC,CPTII,S$GLB, | Performed by: PHYSICIAN ASSISTANT

## 2025-04-22 PROCEDURE — 71046 X-RAY EXAM CHEST 2 VIEWS: CPT | Mod: 26,HCNC,, | Performed by: RADIOLOGY

## 2025-04-22 PROCEDURE — 99999 PR PBB SHADOW E&M-EST. PATIENT-LVL V: CPT | Mod: PBBFAC,HCNC,, | Performed by: PHYSICIAN ASSISTANT

## 2025-04-22 PROCEDURE — 99214 OFFICE O/P EST MOD 30 MIN: CPT | Mod: HCNC,S$GLB,, | Performed by: PHYSICIAN ASSISTANT

## 2025-04-22 PROCEDURE — 71046 X-RAY EXAM CHEST 2 VIEWS: CPT | Mod: TC,HCNC

## 2025-04-22 PROCEDURE — 1159F MED LIST DOCD IN RCRD: CPT | Mod: HCNC,CPTII,S$GLB, | Performed by: PHYSICIAN ASSISTANT

## 2025-04-22 PROCEDURE — 1126F AMNT PAIN NOTED NONE PRSNT: CPT | Mod: HCNC,CPTII,S$GLB, | Performed by: PHYSICIAN ASSISTANT

## 2025-04-22 NOTE — PROGRESS NOTES
Subjective:       Patient ID: Ravi Zamorano is a 69 y.o. male.        Chief Complaint: Follow-up    Ravi Zamorano is an established patient of Mima Mack MD here today for follow up visit.    Presented to ED 3/16/25 due to fever and cough.  CXR showed RUL consolidation and dx with pneumonia, hospital acquired as he was in hospital month before due to bradycardia and syncope.  He had PHUONG while admitted.  Completed outpatient course of levaquin.  Also given oral vancomycin due to diarrhea though c diff was negative.  He presents today feeling fine.  No further cough, fever, or diarrhea.    H/o h pylori tx with levaquin, protonix, amoxil.       HTN -   Valsartan 320 mg daily   Hydralazine 100 mg TID  Hctz 12.5 mg daily  Lasix 40 mg 2 daily prn swelling - uses maybe 1/week  Doxazosin 4 mg daily    Carvedilol - d/c due to bradycardia during hospitalization 2/2025  Spironolactone 25 mg daily - d/c 3/2024 due to getting LH and dizzy  Hctz 25 mg daily - d/c 4/2024 in hospital   Chlorthalidone - d/c 4/27/23 due to symptomatic hypotension  Nifedipine - d/c due to gingival hyperplasia      Anxiety - really bad while driving or riding in the car, long standing, his wife has offered to drive him     DM -   Omnipod  Has insulin pens for back up  Karan Lopez, NP follows                  S/p kidney transplant 11/5/16 secondary to ESRD from HTN and DM     pAF/AFL, NSVT, several runs of VT -  Seen on recent event monitor from 2/2025  Following closely with EP  Considering loop recorder  Will be getting another ablation with Dr. Dennise Iglesias 15 mg daily     Flecainide d/c 2/2024      CKD                    Review of Systems   Constitutional:  Negative for appetite change, chills, fatigue and fever.   HENT:  Negative for congestion and sore throat.    Eyes:  Negative for visual disturbance.   Respiratory:  Negative for cough, chest tightness and shortness of breath.    Cardiovascular:  Negative for chest pain,  palpitations and leg swelling.   Gastrointestinal:  Negative for abdominal pain, blood in stool, constipation, diarrhea, nausea and vomiting.   Genitourinary:  Negative for dysuria, frequency, hematuria and urgency.   Musculoskeletal:  Negative for arthralgias and back pain.   Skin:  Negative for rash.   Neurological:  Negative for dizziness, syncope, weakness and headaches.   Psychiatric/Behavioral:  Negative for dysphoric mood and sleep disturbance. The patient is not nervous/anxious.        Objective:      Physical Exam  Vitals and nursing note reviewed.   Constitutional:       Appearance: He is well-developed.   HENT:      Head: Normocephalic.      Right Ear: External ear normal.      Left Ear: External ear normal.   Eyes:      Pupils: Pupils are equal, round, and reactive to light.   Cardiovascular:      Rate and Rhythm: Normal rate and regular rhythm.      Heart sounds: Normal heart sounds. No murmur heard.     No friction rub. No gallop.   Pulmonary:      Effort: Pulmonary effort is normal. No respiratory distress.      Breath sounds: Normal breath sounds.   Abdominal:      Palpations: Abdomen is soft.      Tenderness: There is no abdominal tenderness.   Musculoskeletal:         General: No swelling.   Skin:     General: Skin is warm and dry.   Neurological:      General: No focal deficit present.      Mental Status: He is alert.   Psychiatric:         Mood and Affect: Mood normal.         Assessment:       1. Screening PSA (prostate specific antigen)    2. History of pneumonia    3. Essential hypertension    4. Mixed hyperlipidemia    5. Status post -donor kidney transplantation    6. Type 2 diabetes mellitus with stage 3a chronic kidney disease, with long-term current use of insulin        Plan:       Ravi was seen today for follow-up.    Diagnoses and all orders for this visit:    Screening PSA (prostate specific antigen)  -     PSA, Screening; Future    History of pneumonia - feeling well, no  "cough or fever, repeat CXR to ensure resolution  -     X-Ray Chest PA And Lateral; Future    Essential hypertension - stable and controlled, continue current regimen  BP Readings from Last 5 Encounters:   25 132/82   25 138/80   04/10/25 (!) 140/83   25 (!) 178/88   25 (!) 159/76      Mixed hyperlipidemia - stable and controlled, continue current regimen  Lab Results   Component Value Date    CHOL 172 2024    TRIG 183 (H) 2024    HDL 30 (L) 2024    LDLCALC 105.4 2024     Status post -donor kidney transplantation     Type 2 diabetes mellitus with stage 3a chronic kidney disease, with long-term current use of insulin - following closely with Karan Lopez NP  Lab Results   Component Value Date    HGBA1C 8.3 (H) 2025    HGBA1C 8.6 (H) 2025    HGBA1C 8.1 (H) 2024     Visit today included increased complexity associated with the care of the episodic problem hospital f/u addressed and managing the longitudinal care of the patient due to the serious and/or complex managed problem(s) diabetes, hypertension, lipids    F/u 6 weeks with Dr. Mack    Pt has been given instructions populated from patient instructions database and has verbalized understanding of the after visit summary and information contained wherein.    Follow up with a primary care provider. May go to ER for acute shortness of breath, lightheadedness, fever, or any other emergent complaints or changes in condition.    "This note will be shared with the patient"    Future Appointments   Date Time Provider Department Center   2025 10:15 AM EPO INJECTION SCHEDULE [facility] NEPHRO Arvind marcus   2025  8:30 AM [facility] LAB [facility]   5/15/2025 11:00 AM Karan Lopez APRN, SALOMEP [facility] IM Arvind CONKLIN   2025 10:30 AM Nayely Vital PA-C [facility] CARDIO Arvind marcus   2025 10:00 AM Argelia Bridges RD [facility] INTLDIA Arvind CONKLIN                 "

## 2025-04-23 ENCOUNTER — RESULTS FOLLOW-UP (OUTPATIENT)
Dept: INTERNAL MEDICINE | Facility: CLINIC | Age: 70
End: 2025-04-23

## 2025-04-24 DIAGNOSIS — I48.3 TYPICAL ATRIAL FLUTTER: ICD-10-CM

## 2025-04-24 DIAGNOSIS — Z86.73 HISTORY OF CVA (CEREBROVASCULAR ACCIDENT): Primary | ICD-10-CM

## 2025-04-24 DIAGNOSIS — I48.19 PERSISTENT ATRIAL FIBRILLATION: ICD-10-CM

## 2025-04-29 ENCOUNTER — HOSPITAL ENCOUNTER (INPATIENT)
Facility: HOSPITAL | Age: 70
LOS: 9 days | Discharge: HOME | DRG: 194 | End: 2025-05-08
Attending: EMERGENCY MEDICINE | Admitting: EMERGENCY MEDICINE
Payer: MEDICARE

## 2025-04-29 DIAGNOSIS — D84.9 IMMUNOCOMPROMISED: ICD-10-CM

## 2025-04-29 DIAGNOSIS — N18.9 ACUTE KIDNEY INJURY SUPERIMPOSED ON CHRONIC KIDNEY DISEASE: ICD-10-CM

## 2025-04-29 DIAGNOSIS — R06.02 SHORTNESS OF BREATH: ICD-10-CM

## 2025-04-29 DIAGNOSIS — D69.6 THROMBOCYTOPENIA: ICD-10-CM

## 2025-04-29 DIAGNOSIS — J18.9 PNEUMONIA OF LEFT LOWER LOBE DUE TO INFECTIOUS ORGANISM: Primary | ICD-10-CM

## 2025-04-29 DIAGNOSIS — Z13.6 SCREENING FOR CARDIOVASCULAR CONDITION: ICD-10-CM

## 2025-04-29 DIAGNOSIS — J15.9 COMMUNITY ACQUIRED BACTERIAL PNEUMONIA: ICD-10-CM

## 2025-04-29 DIAGNOSIS — I50.42 CHRONIC COMBINED SYSTOLIC (CONGESTIVE) AND DIASTOLIC (CONGESTIVE) HEART FAILURE: ICD-10-CM

## 2025-04-29 DIAGNOSIS — R07.9 CHEST PAIN: ICD-10-CM

## 2025-04-29 DIAGNOSIS — E11.42 DIABETIC POLYNEUROPATHY ASSOCIATED WITH TYPE 2 DIABETES MELLITUS: ICD-10-CM

## 2025-04-29 DIAGNOSIS — I73.9 PAD (PERIPHERAL ARTERY DISEASE): ICD-10-CM

## 2025-04-29 DIAGNOSIS — N17.9 ACUTE KIDNEY INJURY SUPERIMPOSED ON CHRONIC KIDNEY DISEASE: ICD-10-CM

## 2025-04-29 DIAGNOSIS — E11.3393 TYPE 2 DIABETES MELLITUS WITH BOTH EYES AFFECTED BY MODERATE NONPROLIFERATIVE RETINOPATHY WITHOUT MACULAR EDEMA, WITHOUT LONG-TERM CURRENT USE OF INSULIN: ICD-10-CM

## 2025-04-29 LAB
ABSOLUTE EOSINOPHIL (OHS): 0.01 K/UL
ABSOLUTE MONOCYTE (OHS): 1.38 K/UL (ref 0.3–1)
ABSOLUTE NEUTROPHIL COUNT (OHS): 5.65 K/UL (ref 1.8–7.7)
ALBUMIN SERPL BCP-MCNC: 2.8 G/DL (ref 3.5–5.2)
ALP SERPL-CCNC: 52 UNIT/L (ref 40–150)
ALT SERPL W/O P-5'-P-CCNC: <5 UNIT/L (ref 10–44)
ANION GAP (OHS): 11 MMOL/L (ref 8–16)
AST SERPL-CCNC: 14 UNIT/L (ref 11–45)
BACTERIA #/AREA URNS AUTO: NORMAL /HPF
BASOPHILS # BLD AUTO: 0.03 K/UL
BASOPHILS NFR BLD AUTO: 0.3 %
BILIRUB SERPL-MCNC: 0.5 MG/DL (ref 0.1–1)
BILIRUB UR QL STRIP.AUTO: NEGATIVE
BIPAP: 0
BUN SERPL-MCNC: 49 MG/DL (ref 8–23)
CALCIUM SERPL-MCNC: 8.2 MG/DL (ref 8.7–10.5)
CHLORIDE SERPL-SCNC: 99 MMOL/L (ref 95–110)
CLARITY UR: CLEAR
CO2 SERPL-SCNC: 25 MMOL/L (ref 23–29)
COLOR UR AUTO: COLORLESS
CORRECTED TEMPERATURE (PCO2): 40.5 MMHG
CORRECTED TEMPERATURE (PH): 7.44
CORRECTED TEMPERATURE (PO2): 47.3 MMHG
CREAT SERPL-MCNC: 2.7 MG/DL (ref 0.5–1.4)
ERYTHROCYTE [DISTWIDTH] IN BLOOD BY AUTOMATED COUNT: 19.4 % (ref 11.5–14.5)
FIO2: 21 %
GFR SERPLBLD CREATININE-BSD FMLA CKD-EPI: 25 ML/MIN/1.73/M2
GLUCOSE SERPL-MCNC: 123 MG/DL (ref 70–110)
GLUCOSE UR QL STRIP: ABNORMAL
HCT VFR BLD AUTO: 31 % (ref 40–54)
HCT VFR BLD CALC: 29.1 %
HGB BLD-MCNC: 9 GM/DL (ref 14–18)
HGB UR QL STRIP: NEGATIVE
HOLD SPECIMEN: NORMAL
HYALINE CASTS UR QL AUTO: 0 /LPF (ref 0–1)
IMM GRANULOCYTES # BLD AUTO: 0.05 K/UL (ref 0–0.04)
IMM GRANULOCYTES NFR BLD AUTO: 0.6 % (ref 0–0.5)
INFLUENZA A MOLECULAR (OHS): NEGATIVE
INFLUENZA B MOLECULAR (OHS): NEGATIVE
KETONES UR QL STRIP: NEGATIVE
LDH SERPL L TO P-CCNC: 1.8 MMOL/L (ref 0.5–2.2)
LEUKOCYTE ESTERASE UR QL STRIP: NEGATIVE
LYMPHOCYTES # BLD AUTO: 1.47 K/UL (ref 1–4.8)
MAGNESIUM SERPL-MCNC: 2.2 MG/DL (ref 1.6–2.6)
MCH RBC QN AUTO: 22.4 PG (ref 27–31)
MCHC RBC AUTO-ENTMCNC: 29 G/DL (ref 32–36)
MCV RBC AUTO: 77 FL (ref 82–98)
MICROSCOPIC COMMENT: NORMAL
NITRITE UR QL STRIP: NEGATIVE
NUCLEATED RBC (/100WBC) (OHS): 0 /100 WBC
OHS QRS DURATION: 102 MS
OHS QRS DURATION: 112 MS
OHS QTC CALCULATION: 453 MS
OHS QTC CALCULATION: 460 MS
PCO2 BLDA: 40.5 MMHG
PH SMN: 7.44 [PH]
PH UR STRIP: 8 [PH]
PLATELET # BLD AUTO: 115 K/UL (ref 150–450)
PMV BLD AUTO: ABNORMAL FL
PO2 BLDA: 47.3 MMHG
POC BASE DEFICIT: 3 MMOL/L
POC HCO3: 26.8 MMOL/L
POC PERFORMED BY: NORMAL
POC TEMPERATURE: 37 C
POCT GLUCOSE: 114 MG/DL (ref 70–110)
POCT GLUCOSE: 127 MG/DL (ref 70–110)
POCT GLUCOSE: 128 MG/DL (ref 70–110)
POTASSIUM SERPL-SCNC: 3.3 MMOL/L (ref 3.5–5.1)
PROT SERPL-MCNC: 5.9 GM/DL (ref 6–8.4)
PROT UR QL STRIP: ABNORMAL
RBC # BLD AUTO: 4.02 M/UL (ref 4.6–6.2)
RBC #/AREA URNS AUTO: <1 /HPF (ref 0–4)
RELATIVE EOSINOPHIL (OHS): 0.1 %
RELATIVE LYMPHOCYTE (OHS): 17.1 % (ref 18–48)
RELATIVE MONOCYTE (OHS): 16.1 % (ref 4–15)
RELATIVE NEUTROPHIL (OHS): 65.8 % (ref 38–73)
SARS-COV-2 RDRP RESP QL NAA+PROBE: NEGATIVE
SODIUM SERPL-SCNC: 135 MMOL/L (ref 136–145)
SP GR UR STRIP: 1.01
SPECIMEN SOURCE: NORMAL
SQUAMOUS #/AREA URNS AUTO: <1 /HPF
TACROLIMUS BLD-MCNC: 5.9 NG/ML (ref 5–15)
UROBILINOGEN UR STRIP-ACNC: NEGATIVE EU/DL
WBC # BLD AUTO: 8.59 K/UL (ref 3.9–12.7)
WBC #/AREA URNS AUTO: 0 /HPF (ref 0–5)

## 2025-04-29 PROCEDURE — 82803 BLOOD GASES ANY COMBINATION: CPT | Mod: HCNC

## 2025-04-29 PROCEDURE — 63600175 PHARM REV CODE 636 W HCPCS: Mod: HCNC | Performed by: STUDENT IN AN ORGANIZED HEALTH CARE EDUCATION/TRAINING PROGRAM

## 2025-04-29 PROCEDURE — 99900035 HC TECH TIME PER 15 MIN (STAT): Mod: HCNC

## 2025-04-29 PROCEDURE — 21400001 HC TELEMETRY ROOM: Mod: HCNC

## 2025-04-29 PROCEDURE — 83605 ASSAY OF LACTIC ACID: CPT | Mod: HCNC

## 2025-04-29 PROCEDURE — U0002 COVID-19 LAB TEST NON-CDC: HCPCS | Mod: HCNC | Performed by: STUDENT IN AN ORGANIZED HEALTH CARE EDUCATION/TRAINING PROGRAM

## 2025-04-29 PROCEDURE — 93005 ELECTROCARDIOGRAM TRACING: CPT | Mod: HCNC

## 2025-04-29 PROCEDURE — 96365 THER/PROPH/DIAG IV INF INIT: CPT | Mod: HCNC

## 2025-04-29 PROCEDURE — 80197 ASSAY OF TACROLIMUS: CPT | Mod: HCNC | Performed by: STUDENT IN AN ORGANIZED HEALTH CARE EDUCATION/TRAINING PROGRAM

## 2025-04-29 PROCEDURE — 11000001 HC ACUTE MED/SURG PRIVATE ROOM: Mod: HCNC

## 2025-04-29 PROCEDURE — 25000003 PHARM REV CODE 250: Mod: HCNC | Performed by: STUDENT IN AN ORGANIZED HEALTH CARE EDUCATION/TRAINING PROGRAM

## 2025-04-29 PROCEDURE — 85025 COMPLETE CBC W/AUTO DIFF WBC: CPT | Mod: HCNC | Performed by: STUDENT IN AN ORGANIZED HEALTH CARE EDUCATION/TRAINING PROGRAM

## 2025-04-29 PROCEDURE — 63600175 PHARM REV CODE 636 W HCPCS: Mod: HCNC

## 2025-04-29 PROCEDURE — 87040 BLOOD CULTURE FOR BACTERIA: CPT | Mod: HCNC | Performed by: STUDENT IN AN ORGANIZED HEALTH CARE EDUCATION/TRAINING PROGRAM

## 2025-04-29 PROCEDURE — 87502 INFLUENZA DNA AMP PROBE: CPT | Mod: HCNC | Performed by: STUDENT IN AN ORGANIZED HEALTH CARE EDUCATION/TRAINING PROGRAM

## 2025-04-29 PROCEDURE — 83735 ASSAY OF MAGNESIUM: CPT | Mod: HCNC | Performed by: STUDENT IN AN ORGANIZED HEALTH CARE EDUCATION/TRAINING PROGRAM

## 2025-04-29 PROCEDURE — 93010 ELECTROCARDIOGRAM REPORT: CPT | Mod: HCNC,,, | Performed by: INTERNAL MEDICINE

## 2025-04-29 PROCEDURE — 80053 COMPREHEN METABOLIC PANEL: CPT | Mod: HCNC | Performed by: STUDENT IN AN ORGANIZED HEALTH CARE EDUCATION/TRAINING PROGRAM

## 2025-04-29 PROCEDURE — 96367 TX/PROPH/DG ADDL SEQ IV INF: CPT | Mod: HCNC

## 2025-04-29 PROCEDURE — 81003 URINALYSIS AUTO W/O SCOPE: CPT | Mod: HCNC | Performed by: STUDENT IN AN ORGANIZED HEALTH CARE EDUCATION/TRAINING PROGRAM

## 2025-04-29 PROCEDURE — 99285 EMERGENCY DEPT VISIT HI MDM: CPT | Mod: 25,HCNC

## 2025-04-29 PROCEDURE — 25000003 PHARM REV CODE 250: Mod: HCNC

## 2025-04-29 RX ORDER — NALOXONE HCL 0.4 MG/ML
0.02 VIAL (ML) INJECTION
Status: DISCONTINUED | OUTPATIENT
Start: 2025-04-29 | End: 2025-05-08 | Stop reason: HOSPADM

## 2025-04-29 RX ORDER — ACETAMINOPHEN 325 MG/1
650 TABLET ORAL EVERY 6 HOURS PRN
Status: DISCONTINUED | OUTPATIENT
Start: 2025-04-29 | End: 2025-05-02

## 2025-04-29 RX ORDER — SODIUM CHLORIDE 0.9 % (FLUSH) 0.9 %
10 SYRINGE (ML) INJECTION EVERY 8 HOURS PRN
Status: DISCONTINUED | OUTPATIENT
Start: 2025-04-29 | End: 2025-05-08 | Stop reason: HOSPADM

## 2025-04-29 RX ORDER — PREDNISONE 2.5 MG/1
5 TABLET ORAL DAILY
Status: DISCONTINUED | OUTPATIENT
Start: 2025-04-29 | End: 2025-05-08 | Stop reason: HOSPADM

## 2025-04-29 RX ORDER — INSULIN GLARGINE 100 [IU]/ML
15 INJECTION, SOLUTION SUBCUTANEOUS NIGHTLY
Status: DISCONTINUED | OUTPATIENT
Start: 2025-04-29 | End: 2025-05-04

## 2025-04-29 RX ORDER — IBUPROFEN 200 MG
24 TABLET ORAL
Status: DISCONTINUED | OUTPATIENT
Start: 2025-04-29 | End: 2025-05-08 | Stop reason: HOSPADM

## 2025-04-29 RX ORDER — ATORVASTATIN CALCIUM 40 MG/1
80 TABLET, FILM COATED ORAL DAILY
Status: DISCONTINUED | OUTPATIENT
Start: 2025-04-29 | End: 2025-05-08 | Stop reason: HOSPADM

## 2025-04-29 RX ORDER — POLYETHYLENE GLYCOL 3350 17 G/17G
17 POWDER, FOR SOLUTION ORAL DAILY
Status: DISCONTINUED | OUTPATIENT
Start: 2025-04-29 | End: 2025-05-08 | Stop reason: HOSPADM

## 2025-04-29 RX ORDER — IBUPROFEN 200 MG
16 TABLET ORAL
Status: DISCONTINUED | OUTPATIENT
Start: 2025-04-29 | End: 2025-05-08 | Stop reason: HOSPADM

## 2025-04-29 RX ORDER — VANCOMYCIN 2 GRAM/500 ML IN 0.9 % SODIUM CHLORIDE INTRAVENOUS
2000
Status: COMPLETED | OUTPATIENT
Start: 2025-04-29 | End: 2025-04-29

## 2025-04-29 RX ORDER — IPRATROPIUM BROMIDE AND ALBUTEROL SULFATE 2.5; .5 MG/3ML; MG/3ML
3 SOLUTION RESPIRATORY (INHALATION) EVERY 6 HOURS PRN
Status: DISCONTINUED | OUTPATIENT
Start: 2025-04-29 | End: 2025-05-08 | Stop reason: HOSPADM

## 2025-04-29 RX ORDER — POLYETHYLENE GLYCOL 3350 17 G/17G
17 POWDER, FOR SOLUTION ORAL DAILY
Status: DISCONTINUED | OUTPATIENT
Start: 2025-04-29 | End: 2025-04-29

## 2025-04-29 RX ORDER — GABAPENTIN 300 MG/1
300 CAPSULE ORAL
Status: DISCONTINUED | OUTPATIENT
Start: 2025-04-29 | End: 2025-04-30

## 2025-04-29 RX ORDER — CEFEPIME HYDROCHLORIDE 1 G/1
1 INJECTION, POWDER, FOR SOLUTION INTRAMUSCULAR; INTRAVENOUS
Status: DISCONTINUED | OUTPATIENT
Start: 2025-04-29 | End: 2025-04-29

## 2025-04-29 RX ORDER — VALSARTAN 160 MG/1
320 TABLET ORAL DAILY
Status: DISCONTINUED | OUTPATIENT
Start: 2025-04-29 | End: 2025-05-03

## 2025-04-29 RX ORDER — TALC
6 POWDER (GRAM) TOPICAL NIGHTLY PRN
Status: DISCONTINUED | OUTPATIENT
Start: 2025-04-29 | End: 2025-05-08 | Stop reason: HOSPADM

## 2025-04-29 RX ORDER — HYDROCHLOROTHIAZIDE 12.5 MG/1
12.5 TABLET ORAL DAILY
Status: DISCONTINUED | OUTPATIENT
Start: 2025-04-29 | End: 2025-05-02

## 2025-04-29 RX ORDER — GLUCAGON 1 MG
1 KIT INJECTION
Status: DISCONTINUED | OUTPATIENT
Start: 2025-04-29 | End: 2025-05-08 | Stop reason: HOSPADM

## 2025-04-29 RX ORDER — HYDRALAZINE HYDROCHLORIDE 50 MG/1
100 TABLET, FILM COATED ORAL EVERY 8 HOURS
Status: DISCONTINUED | OUTPATIENT
Start: 2025-04-29 | End: 2025-05-08 | Stop reason: HOSPADM

## 2025-04-29 RX ORDER — PROCHLORPERAZINE EDISYLATE 5 MG/ML
5 INJECTION INTRAMUSCULAR; INTRAVENOUS EVERY 6 HOURS PRN
Status: DISCONTINUED | OUTPATIENT
Start: 2025-04-29 | End: 2025-05-08 | Stop reason: HOSPADM

## 2025-04-29 RX ORDER — ONDANSETRON 8 MG/1
8 TABLET, ORALLY DISINTEGRATING ORAL EVERY 8 HOURS PRN
Status: DISCONTINUED | OUTPATIENT
Start: 2025-04-29 | End: 2025-05-08 | Stop reason: HOSPADM

## 2025-04-29 RX ORDER — CEFEPIME HYDROCHLORIDE 1 G/1
1 INJECTION, POWDER, FOR SOLUTION INTRAMUSCULAR; INTRAVENOUS
Status: DISCONTINUED | OUTPATIENT
Start: 2025-04-29 | End: 2025-05-02

## 2025-04-29 RX ORDER — MECLIZINE HCL 12.5 MG 12.5 MG/1
25 TABLET ORAL 3 TIMES DAILY PRN
Status: DISCONTINUED | OUTPATIENT
Start: 2025-04-29 | End: 2025-05-08 | Stop reason: HOSPADM

## 2025-04-29 RX ORDER — DOXAZOSIN 1 MG/1
4 TABLET ORAL DAILY
Status: DISCONTINUED | OUTPATIENT
Start: 2025-04-29 | End: 2025-05-02

## 2025-04-29 RX ORDER — INSULIN ASPART 100 [IU]/ML
0-10 INJECTION, SOLUTION INTRAVENOUS; SUBCUTANEOUS
Status: DISCONTINUED | OUTPATIENT
Start: 2025-04-29 | End: 2025-05-06

## 2025-04-29 RX ORDER — HYDRALAZINE HYDROCHLORIDE 20 MG/ML
15 INJECTION INTRAMUSCULAR; INTRAVENOUS EVERY 6 HOURS PRN
Status: DISCONTINUED | OUTPATIENT
Start: 2025-04-30 | End: 2025-05-08 | Stop reason: HOSPADM

## 2025-04-29 RX ADMIN — VALSARTAN 320 MG: 160 TABLET, FILM COATED ORAL at 11:04

## 2025-04-29 RX ADMIN — SODIUM CHLORIDE 2000 MG: 9 INJECTION, SOLUTION INTRAVENOUS at 10:04

## 2025-04-29 RX ADMIN — HYDRALAZINE HYDROCHLORIDE 100 MG: 50 TABLET ORAL at 09:04

## 2025-04-29 RX ADMIN — DOXAZOSIN 4 MG: 2 TABLET ORAL at 11:04

## 2025-04-29 RX ADMIN — GABAPENTIN 300 MG: 300 CAPSULE ORAL at 04:04

## 2025-04-29 RX ADMIN — CEFEPIME 1 G: 1 INJECTION, POWDER, FOR SOLUTION INTRAMUSCULAR; INTRAVENOUS at 12:04

## 2025-04-29 RX ADMIN — ATORVASTATIN CALCIUM 80 MG: 40 TABLET, FILM COATED ORAL at 11:04

## 2025-04-29 RX ADMIN — HYDROCHLOROTHIAZIDE 12.5 MG: 12.5 TABLET ORAL at 11:04

## 2025-04-29 RX ADMIN — GABAPENTIN 300 MG: 300 CAPSULE ORAL at 12:04

## 2025-04-29 RX ADMIN — POTASSIUM BICARBONATE 40 MEQ: 391 TABLET, EFFERVESCENT ORAL at 10:04

## 2025-04-29 RX ADMIN — RIVAROXABAN 15 MG: 15 TABLET, FILM COATED ORAL at 04:04

## 2025-04-29 RX ADMIN — AZITHROMYCIN MONOHYDRATE 500 MG: 500 INJECTION, POWDER, LYOPHILIZED, FOR SOLUTION INTRAVENOUS at 09:04

## 2025-04-29 RX ADMIN — HYDRALAZINE HYDROCHLORIDE 100 MG: 50 TABLET ORAL at 01:04

## 2025-04-29 RX ADMIN — EMPAGLIFLOZIN 10 MG: 10 TABLET, FILM COATED ORAL at 11:04

## 2025-04-29 RX ADMIN — PIPERACILLIN AND TAZOBACTAM 4.5 G: 4; .5 INJECTION, POWDER, LYOPHILIZED, FOR SOLUTION INTRAVENOUS; PARENTERAL at 08:04

## 2025-04-29 RX ADMIN — SODIUM CHLORIDE, POTASSIUM CHLORIDE, SODIUM LACTATE AND CALCIUM CHLORIDE 500 ML: 600; 310; 30; 20 INJECTION, SOLUTION INTRAVENOUS at 10:04

## 2025-04-29 RX ADMIN — CEFEPIME 1 G: 1 INJECTION, POWDER, FOR SOLUTION INTRAMUSCULAR; INTRAVENOUS at 11:04

## 2025-04-29 RX ADMIN — PREDNISONE 5 MG: 5 TABLET ORAL at 11:04

## 2025-04-29 NOTE — SUBJECTIVE & OBJECTIVE
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    CKD (chronic kidney disease) stage 4, GFR 15-29 ml/min 07/29/2014    Colon polyps 2014    Depression - situational 07/29/2014    Diabetes type 2 since 2000 07/29/2014    Diabetic neuropathy 07/29/2014    Encounter for blood transfusion     History of cardioversion 10/03/2019    History of hepatitis C, s/p successful Rx w/ SVR24 - 9/2017 07/29/2014    Genotype 1a, treatment naive 10/2014 liver biopsy - grade 1 / stage 1 Completed Harvoni w/ SVR    Hyperlipidemia 07/29/2014    Hypertension     Neuropathy     Organ transplant candidate 07/29/2014    Pancreatitis     S/P cadaveric kidney transplant 11/5/2016. ESRD secondary to HTN/DMII 11/05/2016    Type 2 diabetes mellitus with stage 3a chronic kidney disease, with long-term current use of insulin 07/29/2014       Past Surgical History:   Procedure Laterality Date    ABLATION OF ARRHYTHMOGENIC FOCUS FOR ATRIAL FIBRILLATION N/A 6/11/2024    Procedure: Ablation atrial fibrillation;  Surgeon: Bronson Bowden MD;  Location: [facility] EP LAB;  Service: Cardiology;  Laterality: N/A;  AF, FELICIANO (cx if SR), PVI, RFA, Carto, Gen, GP, 3 Prep    ABLATION, ATRIAL FLUTTER, TYPICAL  6/11/2024    Procedure: Ablation, Atrial Flutter, Typical;  Surgeon: Bronson Bowden MD;  Location: [facility] EP LAB;  Service: Cardiology;;    APPENDECTOMY      BONE MARROW BIOPSY Left 6/26/2024    Procedure: Biopsy-bone marrow;  Surgeon: Bridger Zapata MD;  Location: [facility] ENDO ([address] [place] [address]);  Service: Oncology;  Laterality: Left;  6/24-pt knows to hold aspirin and eliquis as instructed by hem/onc, precall complete-Kpvt    CARDIOVERSION  6/11/2024    Procedure: Cardioversion;  Surgeon: Bronson Bowden MD;  Location: [facility] EP LAB;  Service: Cardiology;;    CATARACT EXTRACTION W/  INTRAOCULAR LENS IMPLANT  Right 3/13/2024    Procedure: EXTRACTION, CATARACT, WITH IOL INSERTION;  Surgeon: Jennifer Palacio MD;  Location: [facility] OR;  Service: Ophthalmology;  Laterality: Right;    CATARACT EXTRACTION W/  INTRAOCULAR LENS IMPLANT Left 4/10/2024    Procedure: EXTRACTION, CATARACT, WITH IOL INSERTION;  Surgeon: Jennifer Palacio MD;  Location: [facility] OR;  Service: Ophthalmology;  Laterality: Left;    COLONOSCOPY N/A 12/21/2020    Procedure: COLONOSCOPY;  Surgeon: Tico Bell MD;  Location: [facility] ENDO (4TH FLR);  Service: Endoscopy;  Laterality: N/A;  ok to hold eliquis per Dr. Valadez, see telephone encounter 11/13/2020-MS  covid test 12/18 [place]    ECHOCARDIOGRAM,TRANSESOPHAGEAL N/A 2/3/2025    Procedure: Transesophageal echo (FELICIANO) intra-procedure log documentation;  Surgeon: Grayson Lin III, MD;  Location: [facility] EP LAB;  Service: Cardiology;  Laterality: N/A;    KIDNEY TRANSPLANT      TRANSESOPHAGEAL ECHOCARDIOGRAPHY N/A 8/26/2019    Procedure: ECHOCARDIOGRAM, TRANSESOPHAGEAL;  Surgeon: [facility] Diagnostic Provider;  Location: [facility] EP LAB;  Service: Cardiology;  Laterality: N/A;    TRANSESOPHAGEAL ECHOCARDIOGRAPHY N/A 6/11/2024    Procedure: ECHOCARDIOGRAM, TRANSESOPHAGEAL;  Surgeon: Grayson Lin III, MD;  Location: [facility] EP LAB;  Service: Cardiology;  Laterality: N/A;    TREATMENT OF CARDIAC ARRHYTHMIA N/A 8/26/2019    Procedure: CARDIOVERSION;  Surgeon: Bronson Bowden MD;  Location: [facility] EP LAB;  Service: Cardiology;  Laterality: N/A;  AF, FELICIANO, DCCV, MAC, GP, 3 PREP    TREATMENT OF CARDIAC ARRHYTHMIA N/A 2/3/2025    Procedure: Cardioversion or Defibrillation;  Surgeon: Bronson Bowden MD;  Location: [facility] EP LAB;  Service: Cardiology;  Laterality: N/A;  AF, FELICIANO, DCCV, MAC, GP, 3 Prep       Review of patient's allergies indicates:   Allergen Reactions    Nifedipine Other (See Comments)     Gingival hyperplasia       Current Facility-Administered Medications on File Prior to Encounter   Medication    balanced salt  irrigation intra-ocular solution 1 drop    epoetin tanya injection 4,000 Units    phenylephrine HCL 10% ophthalmic solution 1 drop    proparacaine 0.5 % ophthalmic solution 1 drop    sodium chloride 0.9% flush 10 mL    TETRAcaine HCl (PF) 0.5 % Drop 1 drop     Current Outpatient Medications on File Prior to Encounter   Medication Sig    atorvastatin (LIPITOR) 80 MG tablet Take 1 tablet (80 mg total) by mouth once daily.    blood sugar diagnostic Strp Use to check blood glucose 1 times daily, to use with insurance preferred meter    blood-glucose meter Misc Use to check blood glucose 1 times daily, to use with insurance preferred meter    blood-glucose sensor (DEXCOM G7 SENSOR) Kayla Change sensor every 10 days.    doxazosin (CARDURA) 4 MG tablet Take 1 tablet (4 mg total) by mouth once daily.    empagliflozin (JARDIANCE) 10 mg tablet Take 1 tablet (10 mg total) by mouth once daily.    ergocalciferol (ERGOCALCIFEROL) 50,000 unit Cap Take 1 capsule (50,000 Units total) by mouth every 7 days.    furosemide (LASIX) 40 MG tablet Take 2 tablets (80 mg total) by mouth daily as needed (for swelling).    gabapentin (NEURONTIN) 300 MG capsule Take 1 capsule by mouth in the morning, 1 capsule midday, and 3 capsules at night.    hydrALAZINE (APRESOLINE) 100 MG tablet Take 1 tablet (100 mg total) by mouth every 8 (eight) hours.    hydroCHLOROthiazide 12.5 MG Tab Take 1 tablet (12.5 mg total) by mouth once daily.    insulin aspart U-100 (NOVOLOG U-100 INSULIN ASPART) 100 unit/mL injection Use in pump, max daily 100 units.    insulin aspart U-100 (NOVOLOG) 100 unit/mL (3 mL) InPn pen Inject 10 units under the skin w/ breakfast, 7 units at lunch and dinner. Scale 180-230+2, 231-280+4, 281-330+6, 331-380+8, >380+10.  Max daily 57 units. (Patient not taking: Reported on 4/22/2025)    insulin glargine U-100, Lantus, (LANTUS SOLOSTAR U-100 INSULIN) 100 unit/mL (3 mL) InPn pen Inject 20 Units into the skin every morning. (Patient not  "taking: Reported on 4/22/2025)    insulin  cart,aut,G6/7,cntr (OMNIPOD 5 G6-G7 INTRO KT,GEN5,) Crtg Use as directed.    insulin pump cart,auto,BT,G6/7 (OMNIPOD 5 G6-G7 PODS, GEN 5,) Crtg Change every 48 hours.    Lactobacillus rhamnosus GG (CULTURELLE) 10 billion cell capsule Take 1 capsule by mouth once daily.    lancets 30 gauge Misc Use to check blood glucose 1 times daily, to use with insurance preferred meter    magnesium oxide (MAG-OX) 400 mg (241.3 mg magnesium) tablet Take 1 tablet (400 mg total) by mouth 2 (two) times daily.    meclizine (ANTIVERT) 25 mg tablet Take 1 tablet (25 mg total) by mouth 3 (three) times daily as needed for Dizziness.    [Paused] mycophenolate (CELLCEPT) 250 mg Cap Take 2 capsules (500 mg total) by mouth 2 (two) times daily.    pen needle, diabetic (BD ULTRA-FINE LO PEN NEEDLE) 32 gauge x 5/32" Ndle USE TO ADMINISTER INSULIN 4 TIMES DAILY.    polyethylene glycol (MIRALAX) 17 gram PwPk Take 17 gm (mixed in liquid) by mouth every day.    predniSONE (DELTASONE) 5 MG tablet Take 1 tablet (5 mg total) by mouth once daily.    rivaroxaban (XARELTO) 15 mg Tab Take 1 tablet (15 mg total) by mouth daily with dinner or evening meal.    tacrolimus (PROGRAF) 1 MG Cap Take 1 capsule (1 mg total) by mouth every 12 (twelve) hours.    valsartan (DIOVAN) 320 MG tablet Take 1 tablet (320 mg total) by mouth once daily.    [DISCONTINUED] insulin lispro (HUMALOG KWIKPEN INSULIN) 100 unit/mL pen Inject 18 units w/ breakfast, 16 units w/ lunch and dinner plus scale 150-200 +2, 201-250 +4, 251-300 +6, 301-350 +8.     Family History       Problem Relation (Age of Onset)    Cancer Brother    Diabetes Mother    Heart disease Mother, Father    Hypertension Mother, Brother          Tobacco Use    Smoking status: Former     Current packs/day: 0.00     Average packs/day: 0.5 packs/day for 32.0 years (16.0 ttl pk-yrs)     Types: Cigarettes     Start date: 11/28/1984     Quit date: 11/28/2016     Years since " quittin.4    Smokeless tobacco: Never   Substance and Sexual Activity    Alcohol use: Yes     Comment: Pt reports occasional beers on Sundays. Pt reports drinking daily prior to ESRD diagnosis.    Drug use: No    Sexual activity: Yes     Partners: Female     Review of Systems   Constitutional:  Positive for chills and fever. Negative for activity change and appetite change.   HENT:  Negative for congestion and dental problem.    Eyes:  Negative for discharge and itching.   Respiratory:  Positive for cough. Negative for apnea, chest tightness, shortness of breath and wheezing.    Cardiovascular:  Positive for leg swelling. Negative for chest pain and palpitations.   Gastrointestinal:  Negative for abdominal distention and abdominal pain.   Endocrine: Negative for cold intolerance and heat intolerance.   Genitourinary:  Negative for difficulty urinating and dysuria.   Musculoskeletal:  Negative for arthralgias and back pain.   Skin:  Negative for color change and pallor.   Allergic/Immunologic: Negative for environmental allergies and food allergies.   Neurological:  Negative for dizziness and facial asymmetry.   Hematological:  Negative for adenopathy. Does not bruise/bleed easily.   Psychiatric/Behavioral:  Negative for agitation and behavioral problems.      Objective:     Vital Signs (Most Recent):  Temp: 98.3 °F (36.8 °C) (25 0657)  Pulse: 76 (25 1000)  Resp: 16 (25 0900)  BP: (!) 175/83 (25 1000)  SpO2: 97 % (25 1000) Vital Signs (24h Range):  Temp:  [98.3 °F (36.8 °C)] 98.3 °F (36.8 °C)  Pulse:  [76-79] 76  Resp:  [16-18] 16  SpO2:  [96 %-98 %] 97 %  BP: (128-175)/(64-83) 175/83     Weight: 90.7 kg (200 lb)  Body mass index is 26.39 kg/m².     Physical Exam  Constitutional:       General: He is not in acute distress.     Appearance: Normal appearance. He is not ill-appearing, toxic-appearing or diaphoretic.   HENT:      Head: Normocephalic and atraumatic.      Nose: Nose  normal.      Mouth/Throat:      Mouth: Mucous membranes are moist.   Eyes:      Extraocular Movements: Extraocular movements intact.      Conjunctiva/sclera: Conjunctivae normal.      Pupils: Pupils are equal, round, and reactive to light.   Cardiovascular:      Rate and Rhythm: Normal rate and regular rhythm.      Pulses: Normal pulses.      Heart sounds: Normal heart sounds. No murmur heard.     No gallop.   Pulmonary:      Effort: Pulmonary effort is normal. No respiratory distress.      Breath sounds: Rhonchi present. No wheezing or rales.   Abdominal:      General: Abdomen is flat. Bowel sounds are normal. There is no distension.      Palpations: Abdomen is soft.      Tenderness: There is no abdominal tenderness. There is no guarding.   Musculoskeletal:         General: No swelling. Normal range of motion.      Cervical back: Normal range of motion and neck supple.   Skin:     General: Skin is warm and dry.      Capillary Refill: Capillary refill takes less than 2 seconds.   Neurological:      General: No focal deficit present.      Mental Status: He is alert and oriented to person, place, and time. Mental status is at baseline.   Psychiatric:         Mood and Affect: Mood normal.         Behavior: Behavior normal.         Thought Content: Thought content normal.         Judgment: Judgment normal.              CRANIAL NERVES     CN III, IV, VI   Pupils are equal, round, and reactive to light.       Significant Labs: All pertinent labs within the past 24 hours have been reviewed.    Significant Imaging: I have reviewed all pertinent imaging results/findings within the past 24 hours.

## 2025-04-29 NOTE — HPI
This is a 69-year-old male with a history of DM, renal transplant 2016, CHFpEF, AF/AFL (PVI/PWI/CTI 6/2024), CVA, recurrent PNA who presents with fevers, chills.  States that symptoms are similar to last month when he was admitted for pneumonia.  Was in his usual state of health until last night when he started experiencing intermittent fevers, chills.  Wife states she also noticed that he has had increased intermittent cough over the past day or so.  Patient did not notice this.  Denies any production.  Also denies any shortness for breath, wheezing, chest pain, abdominal pain, diarrhea, nausea, vomiting, dysuria.  Compliant with all medications.  Trace lower extremity swelling is chronic.    ED course:  On room air.  Lab work significant for Cr 2.7, same as previous. CXR showed development of RLL consolidation.  Received IV vancomycin, azithromycin, Zosyn.  Septic workup obtained.

## 2025-04-29 NOTE — ASSESSMENT & PLAN NOTE
-Resume home tacro 1/1 and pred 5 mg Qday  -Tacro target 5-7  -Monitor for toxicities and SE, none noted

## 2025-04-29 NOTE — ASSESSMENT & PLAN NOTE
Anemia is likely due to chronic disease due to Chronic Kidney Disease. Most recent hemoglobin and hematocrit are listed below.  Recent Labs     04/29/25  0758 04/29/25  0804   HGB 9.0*  --    HCT 31.0* 29.1     Plan  - Monitor serial CBC: Daily  - Transfuse PRBC if patient becomes hemodynamically unstable, symptomatic or H/H drops below 7/21.  - Patient has not received any PRBC transfusions to date  - Patient's anemia is currently stable

## 2025-04-29 NOTE — PROGRESS NOTES
Pharmacist Renal Dose Adjustment Note    Ravi Zamorano is a 69 y.o. male being treated with the medication cefepime    Patient Data:    Vital Signs (Most Recent):  Temp: 98.3 °F (36.8 °C) (04/29/25 0657)  Pulse: 76 (04/29/25 1000)  Resp: 16 (04/29/25 0900)  BP: (!) 175/83 (04/29/25 1000)  SpO2: 97 % (04/29/25 1000) Vital Signs (72h Range):  Temp:  [98.3 °F (36.8 °C)]   Pulse:  [76-79]   Resp:  [16-18]   BP: (128-175)/(64-83)   SpO2:  [96 %-98 %]      Recent Labs   Lab 04/29/25  0758   CREATININE 2.7*     Serum creatinine: 2.7 mg/dL (H) 04/29/25 0758  Estimated creatinine clearance: 30 mL/min (A)    Cefepime 2g every 8 hr will be changed to cefepime 2g every 12 hr.      Pharmacist's Name: Meaghan Patterson  Pharmacist's Extension: [phone]

## 2025-04-29 NOTE — ASSESSMENT & PLAN NOTE
-See prophylactic immunotherapy.   -Will monitor net IS, as may be contributing to recurrent infections

## 2025-04-29 NOTE — H&P
[facility] - Emergency Dept  Hospital Medicine  History & Physical    Patient Name: Ravi Zamorano  MRN: [id number]  Patient Class: IP- Inpatient  Admission Date: 4/29/2025  Attending Physician: Franky Gibbons DO   Primary Care Provider: Mima Mack MD         Patient information was obtained from patient, spouse/SO, and ER records.     Subjective:     Principal Problem:Community acquired bacterial pneumonia    Chief Complaint:   Chief Complaint   Patient presents with    Fever     Pt reports fever of 103.2. +nonproductive cough.  Tylenol given PTA. Hx of Kidney transplant 2016        HPI: This is a 69-year-old male with a history of DM, renal transplant 2016, CHFpEF, AF/AFL (PVI/PWI/CTI 6/2024), CVA, recurrent PNA who presents with fevers, chills.  States that symptoms are similar to last month when he was admitted for pneumonia.  Was in his usual state of health until last night when he started experiencing intermittent fevers, chills.  Wife states she also noticed that he has had increased intermittent cough over the past day or so.  Patient did not notice this.  Denies any production.  Also denies any shortness for breath, wheezing, chest pain, abdominal pain, diarrhea, nausea, vomiting, dysuria.  Compliant with all medications.  Trace lower extremity swelling is chronic.    ED course:  On room air.  Lab work significant for Cr 2.7, same as previous. CXR showed development of RLL consolidation.  Received IV vancomycin, azithromycin, Zosyn.  Septic workup obtained.    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    CKD (chronic kidney disease) stage 4, GFR 15-29 ml/min 07/29/2014    Colon polyps 2014    Depression - situational 07/29/2014    Diabetes type 2 since 2000 07/29/2014    Diabetic neuropathy 07/29/2014    Encounter for blood  transfusion     History of cardioversion 10/03/2019    History of hepatitis C, s/p successful Rx w/ SVR24 - 9/2017 07/29/2014    Genotype 1a, treatment naive 10/2014 liver biopsy - grade 1 / stage 1 Completed Harvoni w/ SVR    Hyperlipidemia 07/29/2014    Hypertension     Neuropathy     Organ transplant candidate 07/29/2014    Pancreatitis     S/P cadaveric kidney transplant 11/5/2016. ESRD secondary to HTN/DMII 11/05/2016    Type 2 diabetes mellitus with stage 3a chronic kidney disease, with long-term current use of insulin 07/29/2014       Past Surgical History:   Procedure Laterality Date    ABLATION OF ARRHYTHMOGENIC FOCUS FOR ATRIAL FIBRILLATION N/A 6/11/2024    Procedure: Ablation atrial fibrillation;  Surgeon: Bronson Bowden MD;  Location: [facility] EP LAB;  Service: Cardiology;  Laterality: N/A;  AF, FELICIANO (cx if SR), PVI, RFA, Carto, Gen, GP, 3 Prep    ABLATION, ATRIAL FLUTTER, TYPICAL  6/11/2024    Procedure: Ablation, Atrial Flutter, Typical;  Surgeon: Bronson Bowden MD;  Location: [facility] EP LAB;  Service: Cardiology;;    APPENDECTOMY      BONE MARROW BIOPSY Left 6/26/2024    Procedure: Biopsy-bone marrow;  Surgeon: Bridger Zapata MD;  [facility]  Service: Oncology;  Laterality: Left;  6/24-pt knows to hold aspirin and eliquis as instructed by hem/onc, precall complete-Kpvt    CARDIOVERSION  6/11/2024    Procedure: Cardioversion;  Surgeon: Bronson Bowden MD;  Location: [facility] EP LAB;  Service: Cardiology;;    CATARACT EXTRACTION W/  INTRAOCULAR LENS IMPLANT Right 3/13/2024    Procedure: EXTRACTION, CATARACT, WITH IOL INSERTION;  Surgeon: Jennifer Palacio MD;  Location: [facility] OR;  Service: Ophthalmology;  Laterality: Right;    CATARACT EXTRACTION W/  INTRAOCULAR LENS IMPLANT Left 4/10/2024    Procedure: EXTRACTION, CATARACT, WITH IOL INSERTION;  Surgeon: Jennifer Palacio MD;  Location: [facility] OR;  Service: Ophthalmology;  Laterality: Left;    COLONOSCOPY N/A 12/21/2020    Procedure:  COLONOSCOPY;  Surgeon: Tico Bell MD;  Location: [facility] ENDO (4TH FLR);  Service: Endoscopy;  Laterality: N/A;  ok to hold holli per Dr. Valadez, see telephone encounter 11/13/2020-MS  covid test 12/18 [place]    ECHOCARDIOGRAM,TRANSESOPHAGEAL N/A 2/3/2025    Procedure: Transesophageal echo (FELICIANO) intra-procedure log documentation;  Surgeon: Grayson Lin III, MD;  Location: [facility] EP LAB;  Service: Cardiology;  Laterality: N/A;    KIDNEY TRANSPLANT      TRANSESOPHAGEAL ECHOCARDIOGRAPHY N/A 8/26/2019    Procedure: ECHOCARDIOGRAM, TRANSESOPHAGEAL;  Surgeon: [facility];  Location: [facility] EP LAB;  Service: Cardiology;  Laterality: N/A;    TRANSESOPHAGEAL ECHOCARDIOGRAPHY N/A 6/11/2024    Procedure: ECHOCARDIOGRAM, TRANSESOPHAGEAL;  Surgeon: Grayson Lin III, MD;  Location: [facility] EP LAB;  Service: Cardiology;  Laterality: N/A;    TREATMENT OF CARDIAC ARRHYTHMIA N/A 8/26/2019    Procedure: CARDIOVERSION;  Surgeon: Bronson Bowden MD;  Location: [facility] EP LAB;  Service: Cardiology;  Laterality: N/A;  AF, FELICIANO, DCCV, MAC, GP, 3 PREP    TREATMENT OF CARDIAC ARRHYTHMIA N/A 2/3/2025    Procedure: Cardioversion or Defibrillation;  Surgeon: Bronson Bowden MD;  Location: [facility] EP LAB;  Service: Cardiology;  Laterality: N/A;  AF, FELICIANO, DCCV, MAC, GP, 3 Prep       Review of patient's allergies indicates:   Allergen Reactions    Nifedipine Other (See Comments)     Gingival hyperplasia       Current Facility-Administered Medications on File Prior to Encounter   Medication    balanced salt irrigation intra-ocular solution 1 drop    epoetin tanya injection 4,000 Units    phenylephrine HCL 10% ophthalmic solution 1 drop    proparacaine 0.5 % ophthalmic solution 1 drop    sodium chloride 0.9% flush 10 mL    TETRAcaine HCl (PF) 0.5 % Drop 1 drop     Current Outpatient Medications on File Prior to Encounter   Medication Sig    atorvastatin (LIPITOR) 80 MG tablet Take 1 tablet (80 mg total) by mouth once daily.    blood  sugar diagnostic Strp Use to check blood glucose 1 times daily, to use with insurance preferred meter    blood-glucose meter Misc Use to check blood glucose 1 times daily, to use with insurance preferred meter    blood-glucose sensor (DEXCOM G7 SENSOR) Kayla Change sensor every 10 days.    doxazosin (CARDURA) 4 MG tablet Take 1 tablet (4 mg total) by mouth once daily.    empagliflozin (JARDIANCE) 10 mg tablet Take 1 tablet (10 mg total) by mouth once daily.    ergocalciferol (ERGOCALCIFEROL) 50,000 unit Cap Take 1 capsule (50,000 Units total) by mouth every 7 days.    furosemide (LASIX) 40 MG tablet Take 2 tablets (80 mg total) by mouth daily as needed (for swelling).    gabapentin (NEURONTIN) 300 MG capsule Take 1 capsule by mouth in the morning, 1 capsule midday, and 3 capsules at night.    hydrALAZINE (APRESOLINE) 100 MG tablet Take 1 tablet (100 mg total) by mouth every 8 (eight) hours.    hydroCHLOROthiazide 12.5 MG Tab Take 1 tablet (12.5 mg total) by mouth once daily.    insulin aspart U-100 (NOVOLOG U-100 INSULIN ASPART) 100 unit/mL injection Use in pump, max daily 100 units.    insulin aspart U-100 (NOVOLOG) 100 unit/mL (3 mL) InPn pen Inject 10 units under the skin w/ breakfast, 7 units at lunch and dinner. Scale 180-230+2, 231-280+4, 281-330+6, 331-380+8, >380+10.  Max daily 57 units. (Patient not taking: Reported on 4/22/2025)    insulin glargine U-100, Lantus, (LANTUS SOLOSTAR U-100 INSULIN) 100 unit/mL (3 mL) InPn pen Inject 20 Units into the skin every morning. (Patient not taking: Reported on 4/22/2025)    insulin  cart,aut,G6/7,cntr (OMNIPOD 5 G6-G7 INTRO KT,GEN5,) Crtg Use as directed.    insulin pump cart,auto,BT,G6/7 (OMNIPOD 5 G6-G7 PODS, GEN 5,) Crtg Change every 48 hours.    Lactobacillus rhamnosus GG (CULTURELLE) 10 billion cell capsule Take 1 capsule by mouth once daily.    lancets 30 gauge Misc Use to check blood glucose 1 times daily, to use with insurance preferred meter    magnesium  "oxide (MAG-OX) 400 mg (241.3 mg magnesium) tablet Take 1 tablet (400 mg total) by mouth 2 (two) times daily.    meclizine (ANTIVERT) 25 mg tablet Take 1 tablet (25 mg total) by mouth 3 (three) times daily as needed for Dizziness.    [Paused] mycophenolate (CELLCEPT) 250 mg Cap Take 2 capsules (500 mg total) by mouth 2 (two) times daily.    pen needle, diabetic (BD ULTRA-FINE LO PEN NEEDLE) 32 gauge x 5/32" Ndle USE TO ADMINISTER INSULIN 4 TIMES DAILY.    polyethylene glycol (MIRALAX) 17 gram PwPk Take 17 gm (mixed in liquid) by mouth every day.    predniSONE (DELTASONE) 5 MG tablet Take 1 tablet (5 mg total) by mouth once daily.    rivaroxaban (XARELTO) 15 mg Tab Take 1 tablet (15 mg total) by mouth daily with dinner or evening meal.    tacrolimus (PROGRAF) 1 MG Cap Take 1 capsule (1 mg total) by mouth every 12 (twelve) hours.    valsartan (DIOVAN) 320 MG tablet Take 1 tablet (320 mg total) by mouth once daily.    [DISCONTINUED] insulin lispro (HUMALOG KWIKPEN INSULIN) 100 unit/mL pen Inject 18 units w/ breakfast, 16 units w/ lunch and dinner plus scale 150-200 +2, 201-250 +4, 251-300 +6, 301-350 +8.     Family History       Problem Relation (Age of Onset)    Cancer Brother    Diabetes Mother    Heart disease Mother, Father    Hypertension Mother, Brother          Tobacco Use    Smoking status: Former     Current packs/day: 0.00     Average packs/day: 0.5 packs/day for 32.0 years (16.0 ttl pk-yrs)     Types: Cigarettes     Start date: 1984     Quit date: 2016     Years since quittin.4    Smokeless tobacco: Never   Substance and Sexual Activity    Alcohol use: Yes     Comment: Pt reports occasional beers on Sundays. Pt reports drinking daily prior to ESRD diagnosis.    Drug use: No    Sexual activity: Yes     Partners: Female     Review of Systems   Constitutional:  Positive for chills and fever. Negative for activity change and appetite change.   HENT:  Negative for congestion and dental problem.  "   Eyes:  Negative for discharge and itching.   Respiratory:  Positive for cough. Negative for apnea, chest tightness, shortness of breath and wheezing.    Cardiovascular:  Positive for leg swelling. Negative for chest pain and palpitations.   Gastrointestinal:  Negative for abdominal distention and abdominal pain.   Endocrine: Negative for cold intolerance and heat intolerance.   Genitourinary:  Negative for difficulty urinating and dysuria.   Musculoskeletal:  Negative for arthralgias and back pain.   Skin:  Negative for color change and pallor.   Allergic/Immunologic: Negative for environmental allergies and food allergies.   Neurological:  Negative for dizziness and facial asymmetry.   Hematological:  Negative for adenopathy. Does not bruise/bleed easily.   Psychiatric/Behavioral:  Negative for agitation and behavioral problems.      Objective:     Vital Signs (Most Recent):  Temp: 98.3 °F (36.8 °C) (04/29/25 0657)  Pulse: 76 (04/29/25 1000)  Resp: 16 (04/29/25 0900)  BP: (!) 175/83 (04/29/25 1000)  SpO2: 97 % (04/29/25 1000) Vital Signs (24h Range):  Temp:  [98.3 °F (36.8 °C)] 98.3 °F (36.8 °C)  Pulse:  [76-79] 76  Resp:  [16-18] 16  SpO2:  [96 %-98 %] 97 %  BP: (128-175)/(64-83) 175/83     Weight: 90.7 kg (200 lb)  Body mass index is 26.39 kg/m².     Physical Exam  Constitutional:       General: He is not in acute distress.     Appearance: Normal appearance. He is not ill-appearing, toxic-appearing or diaphoretic.   HENT:      Head: Normocephalic and atraumatic.      Nose: Nose normal.      Mouth/Throat:      Mouth: Mucous membranes are moist.   Eyes:      Extraocular Movements: Extraocular movements intact.      Conjunctiva/sclera: Conjunctivae normal.      Pupils: Pupils are equal, round, and reactive to light.   Cardiovascular:      Rate and Rhythm: Normal rate and regular rhythm.      Pulses: Normal pulses.      Heart sounds: Normal heart sounds. No murmur heard.     No gallop.   Pulmonary:      Effort:  Pulmonary effort is normal. No respiratory distress.      Breath sounds: Rhonchi present. No wheezing or rales.   Abdominal:      General: Abdomen is flat. Bowel sounds are normal. There is no distension.      Palpations: Abdomen is soft.      Tenderness: There is no abdominal tenderness. There is no guarding.   Musculoskeletal:         General: No swelling. Normal range of motion.      Cervical back: Normal range of motion and neck supple.   Skin:     General: Skin is warm and dry.      Capillary Refill: Capillary refill takes less than 2 seconds.   Neurological:      General: No focal deficit present.      Mental Status: He is alert and oriented to person, place, and time. Mental status is at baseline.   Psychiatric:         Mood and Affect: Mood normal.         Behavior: Behavior normal.         Thought Content: Thought content normal.         Judgment: Judgment normal.              CRANIAL NERVES     CN III, IV, VI   Pupils are equal, round, and reactive to light.       Significant Labs: All pertinent labs within the past 24 hours have been reviewed.    Significant Imaging: I have reviewed all pertinent imaging results/findings within the past 24 hours.  Assessment/Plan:     Assessment & Plan  Community acquired bacterial pneumonia  Patient has a diagnosis of pneumonia. The cause of the pneumonia is suspected to be bacterial in etiology but organism is not known. The pneumonia is stable. The patient has the following signs/symptoms of pneumonia: cough. The patient does not have a current oxygen requirement and the patient does not have a home oxygen requirement. I have reviewed the pertinent imaging. The following cultures have been collected: Blood cultures and Sputum culture The culture results are listed below.     Current antimicrobial regimen consists of the antibiotics listed below. Will monitor patient closely and continue current treatment plan unchanged.    -Given fevers/chills and recurrent pna,  "reasonable to continue below abx and deescalate when indicated  -f/u sputum, blood cx    Antibiotics (From admission, onward)      Start     Stop Route Frequency Ordered    04/30/25 0900  azithromycin (ZITHROMAX) 500 mg in 0.9% NaCl 250 mL IVPB (admixture device)         05/02/25 0859 IV Every 24 hours (non-standard times) 04/29/25 1016    04/29/25 1145  ceFEPIme injection 1 g         -- IV Every 12 hours (non-standard times) 04/29/25 1037    04/29/25 1114  vancomycin - pharmacy to dose  (vancomycin IVPB (PEDS and ADULTS))        Placed in "And" Linked Group    -- IV pharmacy to manage frequency 04/29/25 1016    04/29/25 0800  vancomycin 2 g in 0.9% sodium chloride 500 mL IVPB         04/29/25 1959 IV ED 1 Time 04/29/25 0753            Microbiology Results (last 7 days)       Procedure Component Value Units Date/Time    Culture, Respiratory with Gram Stain [[id number]]     Order Status: Sent Specimen: Respiratory     Influenza A & B by Molecular [[id number]]  (Normal) Collected: 04/29/25 0749    Order Status: Completed Specimen: Nasal Swab Updated: 04/29/25 0836     INFLUENZA A MOLECULAR Negative     INFLUENZA B MOLECULAR  Negative    Blood culture x two cultures. Draw prior to antibiotics. [[id number]] Collected: 04/29/25 0758    Order Status: Sent Specimen: Blood from Peripheral, Antecubital, Left Updated: 04/29/25 0811    Blood culture x two cultures. Draw prior to antibiotics. [[id number]] Collected: 04/29/25 0758    Order Status: Sent Specimen: Blood from Peripheral, Forearm, Left Updated: 04/29/25 0811          Type 2 diabetes mellitus with stage 3a chronic kidney disease, with long-term current use of insulin  Creatine stable for now. BMP reviewed- noted Estimated Creatinine Clearance: 30 mL/min (A) (based on SCr of 2.7 mg/dL (H)). according to latest data. Based on current GFR, CKD stage is stage 3 - GFR 30-59.  Monitor UOP and serial BMP and adjust therapy as needed. Renally dose meds. Avoid nephrotoxic " "medications and procedures.  Essential hypertension  Patient's blood pressure range in the last 24 hours was: BP  Min: 128/64  Max: 175/83.The patient's inpatient anti-hypertensive regimen is listed below:  Current Antihypertensives  doxazosin tablet 4 mg, Daily, Oral  hydrALAZINE tablet 100 mg, Every 8 hours, Oral  hydroCHLOROthiazide tablet 12.5 mg, Daily, Oral  valsartan tablet 320 mg, Daily, Oral    Plan  - BP is controlled, no changes needed to their regimen  Hyperlipidemia  Cont statin    Chronic combined systolic (congestive) and diastolic (congestive) heart failure  Patient has Diastolic (HFpEF) heart failure that is Chronic. On presentation their CHF was well compensated. Most recent BNP and echo results are listed below.  No results for input(s): "BNP" in the last 72 hours.  Latest ECHO  Results for orders placed during the hospital encounter of 02/20/25    Echo    Interpretation Summary    Left Ventricle: There is mild concentric hypertrophy. There is low normal systolic function with a visually estimated ejection fraction of 50 - 55%. Grade III diastolic dysfunction.    Left Ventricle: The left ventricle is at the upper limits of normal in size. Mildly increased wall thickness. There is mild concentric hypertrophy. Regional wall motion abnormalities present. See diagram for wall motion findings. There is low normal systolic function with a visually estimated ejection fraction of 50 - 55%. Ejection fraction is approximately 53%. Grade III diastolic dysfunction.    Right Ventricle: Systolic function is borderline low despite the low TAPSE.    Left Atrium: Left atrium is moderately dilated.    Aortic Valve: The aortic valve is a trileaflet valve. There is mild aortic valve sclerosis. There is moderate annular calcification present. There is mild aortic regurgitation with a centrally directed jet.    Mitral Valve: There is mild regurgitation with a centrally directed jet.    Pericardium: There is a trivial " "posterior effusion and under the RA.    Tricuspid Valve: There is mild regurgitation.    Pulmonary Artery: The estimated pulmonary artery systolic pressure is 30 mmHg.    Current Heart Failure Medications  hydrALAZINE tablet 100 mg, Every 8 hours, Oral  hydroCHLOROthiazide tablet 12.5 mg, Daily, Oral  valsartan tablet 320 mg, Daily, Oral  empagliflozin (Jardiance) tablet 10 mg, Daily, Oral    Plan  - Monitor strict I&Os and daily weights.    - Place on telemetry  - Low sodium diet  - Cardiology has not been consulted  - The patient's volume status is at their baseline  S/P cadaveric kidney transplant 11/5/2016. ESRD secondary to HTN/DMII  -KTM consulted  -daily tacro level, dosing per KTM  -Cont prednisone    Stage 3 chronic kidney disease  Creatine stable for now. BMP reviewed- noted Estimated Creatinine Clearance: 30 mL/min (A) (based on SCr of 2.7 mg/dL (H)). according to latest data. Based on current GFR, CKD stage is stage 3 - GFR 30-59.  Monitor UOP and serial BMP and adjust therapy as needed. Renally dose meds. Avoid nephrotoxic medications and procedures.  Type 2 diabetes mellitus  Patient's FSGs are controlled on current medication regimen.  Last A1c reviewed-   Lab Results   Component Value Date    HGBA1C 8.3 (H) 03/27/2025     Most recent fingerstick glucose reviewed- No results for input(s): "POCTGLUCOSE" in the last 24 hours.  Current correctional scale  Medium  Maintain anti-hyperglycemic dose as follows-   Antihyperglycemics (From admission, onward)      Start     Stop Route Frequency Ordered    04/29/25 2100  insulin glargine U-100 (Lantus) pen 15 Units         -- SubQ Nightly 04/29/25 1016    04/29/25 1115  empagliflozin (Jardiance) tablet 10 mg        Question Answer Comment   Does this patient have a diagnosis of heart failure? Yes    Does this patient have type 1 diabetes or diabetic ketoacidosis? No    Does this patient have symptomatic hypotension? No    Is the patient NPO or pending major " surgery in next 3 days or less? No        -- Oral Daily 04/29/25 1016    04/29/25 1113  insulin aspart U-100 pen 0-10 Units         -- SubQ Before meals & nightly PRN 04/29/25 1016          Hold Oral hypoglycemics while patient is in the hospital.  Persistent atrial fibrillation  Patient has persistent (7 days or more) atrial fibrillation. Patient is currently in atrial fibrillation. [id number] Score: 3. The patients heart rate in the last 24 hours is as follows:  Pulse  Min: 76  Max: 79     Antiarrhythmics       Anticoagulants  rivaroxaban tablet 15 mg, With dinner, Oral    Plan  - Replete lytes with a goal of K>4, Mg >2  - Patient is anticoagulated, see medications listed above.  - Patient's afib is currently controlled  Anticoagulant long-term use  This patient has long term use on an anticoagulant with Select Anticoagulant(s): Direct oral anticoagulant: Rivaroxaban (Xarelto). Their long term anticoagulation will be Held or Continued: continued. They are on long term anticoagulation due to Reason for Anticoagulation: Atrial fibrillation.   BPH with urinary obstruction  Doxazosin   Immunosuppressed status      Anemia of chronic disease  Anemia is likely due to chronic disease due to Chronic Kidney Disease. Most recent hemoglobin and hematocrit are listed below.  Recent Labs     04/29/25  0758 04/29/25  0804   HGB 9.0*  --    HCT 31.0* 29.1     Plan  - Monitor serial CBC: Daily  - Transfuse PRBC if patient becomes hemodynamically unstable, symptomatic or H/H drops below 7/21.  - Patient has not received any PRBC transfusions to date  - Patient's anemia is currently stable  Hypokalemia  Patient's most recent potassium results are listed below.   Recent Labs     04/29/25  0758   K 3.3*     Plan  - Replete potassium per protocol  - Monitor potassium Daily  - Patient's hypokalemia is stable  VTE Risk Mitigation (From admission, onward)           Ordered     rivaroxaban tablet 15 mg  With dinner         04/29/25 1016      IP VTE HIGH RISK PATIENT  Once         04/29/25 1016     Place sequential compression device  Until discontinued         04/29/25 1016                               Pharmacist Renal Dose Adjustment Note    Ravi Zamorano is a 69 y.o. male being treated with the medication cefepime    Patient Data:    Vital Signs (Most Recent):  Temp: 98.3 °F (36.8 °C) (04/29/25 0657)  Pulse: 76 (04/29/25 1000)  Resp: 16 (04/29/25 0900)  BP: (!) 175/83 (04/29/25 1000)  SpO2: 97 % (04/29/25 1000) Vital Signs (72h Range):  Temp:  [98.3 °F (36.8 °C)]   Pulse:  [76-79]   Resp:  [16-18]   BP: (128-175)/(64-83)   SpO2:  [96 %-98 %]      Recent Labs   Lab 04/29/25  0758   CREATININE 2.7*     Serum creatinine: 2.7 mg/dL (H) 04/29/25 0758  Estimated creatinine clearance: 30 mL/min (A)    Cefepime 2g every 8 hr will be changed to cefepime 2g every 12 hr.      Pharmacist's Name: Meaghan Patterson  Pharmacist's Extension: [phone]      Pedro Cummins DO  Department of Hospital Medicine  [facility] - Emergency Dept

## 2025-04-29 NOTE — HPI
Ravi has ESRD presumed secondary to DM/HTN post  DDRT 11/5/16. his creatinines were in the 1.9-2 ranging late 2024, peaked at 3.0 2/20/25 and  recently have been 2.2-2.5. He presents with fever and noted to have RLL PNA. sepsis protocol initiated and started on broad spectrum abx. Of note, he was recently hospitalized for PNA as well, although different location.  Home IS: tacrolimus 1/1,  mg bid, prednisone 5 mg.

## 2025-04-29 NOTE — ASSESSMENT & PLAN NOTE
"Patient has Diastolic (HFpEF) heart failure that is Chronic. On presentation their CHF was well compensated. Most recent BNP and echo results are listed below.  No results for input(s): "BNP" in the last 72 hours.  Latest ECHO  Results for orders placed during the hospital encounter of 02/20/25    Echo    Interpretation Summary    Left Ventricle: There is mild concentric hypertrophy. There is low normal systolic function with a visually estimated ejection fraction of 50 - 55%. Grade III diastolic dysfunction.    Left Ventricle: The left ventricle is at the upper limits of normal in size. Mildly increased wall thickness. There is mild concentric hypertrophy. Regional wall motion abnormalities present. See diagram for wall motion findings. There is low normal systolic function with a visually estimated ejection fraction of 50 - 55%. Ejection fraction is approximately 53%. Grade III diastolic dysfunction.    Right Ventricle: Systolic function is borderline low despite the low TAPSE.    Left Atrium: Left atrium is moderately dilated.    Aortic Valve: The aortic valve is a trileaflet valve. There is mild aortic valve sclerosis. There is moderate annular calcification present. There is mild aortic regurgitation with a centrally directed jet.    Mitral Valve: There is mild regurgitation with a centrally directed jet.    Pericardium: There is a trivial posterior effusion and under the RA.    Tricuspid Valve: There is mild regurgitation.    Pulmonary Artery: The estimated pulmonary artery systolic pressure is 30 mmHg.    Current Heart Failure Medications  hydrALAZINE tablet 100 mg, Every 8 hours, Oral  hydroCHLOROthiazide tablet 12.5 mg, Daily, Oral  valsartan tablet 320 mg, Daily, Oral  empagliflozin (Jardiance) tablet 10 mg, Daily, Oral    Plan  - Monitor strict I&Os and daily weights.    - Place on telemetry  - Low sodium diet  - Cardiology has not been consulted  - The patient's volume status is at their baseline  "

## 2025-04-29 NOTE — ED TRIAGE NOTES
Patient identifiers for Ravi Zamorano 69 y.o. male checked and correct.    Patient arrives to ED via POV c/o fever of 103 and chills at home last night. Treated with tylenol at home and presents afebrile to ED. Patient endorses intermittent nonproductive cough. Denies abdominal pain, NVD, dysuria. Hx of kidney transplant in 2016.     Chief Complaint   Patient presents with    Fever     Pt reports fever of 103.2. +nonproductive cough.  Tylenol given PTA. Hx of Kidney transplant 2016     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    CKD (chronic kidney disease) stage 4, GFR 15-29 ml/min 07/29/2014    Colon polyps 2014    Depression - situational 07/29/2014    Diabetes type 2 since 2000 07/29/2014    Diabetic neuropathy 07/29/2014    Encounter for blood transfusion     History of cardioversion 10/03/2019    History of hepatitis C, s/p successful Rx w/ SVR24 - 9/2017 07/29/2014    Genotype 1a, treatment naive 10/2014 liver biopsy - grade 1 / stage 1 Completed Harvoni w/ SVR    Hyperlipidemia 07/29/2014    Hypertension     Neuropathy     Organ transplant candidate 07/29/2014    Pancreatitis     S/P cadaveric kidney transplant 11/5/2016. ESRD secondary to HTN/DMII 11/05/2016    Type 2 diabetes mellitus with stage 3a chronic kidney disease, with long-term current use of insulin 07/29/2014     Allergies reported:   Review of patient's allergies indicates:   Allergen Reactions    Nifedipine Other (See Comments)     Gingival hyperplasia

## 2025-04-29 NOTE — ED PROVIDER NOTES
Encounter Date: 4/29/2025       History     Chief Complaint   Patient presents with    Fever     Pt reports fever of 103.2. +nonproductive cough.  Tylenol given PTA. Hx of Kidney transplant 2016     HPI    69-year-old male with history of renal transplant, pancreatitis, hypertension, diabetes, hep C, AFib, atrial flutter, PHUONG, thrombocytopenia, wide complex tachycardia, anticoagulated on Xarelto when immunosuppressed on tacrolimus, presenting for fever.    Patient reports starting last night he had a temperature of 103.2° F. at the time he felt subjective fevers and chills, and had rigors.  His wife gave him Tylenol and his temperature improved to 99° F. at the time of the fever, his wife reports a dry nonproductive cough.  Here in the emergency department he denies chest pain, shortness of breath, nausea, abdominal pain, sore throat, headache, dysuria, diarrhea, polyuria.  No known sick contacts recently.      Review of patient's allergies indicates:   Allergen Reactions    Nifedipine Other (See Comments)     Gingival hyperplasia     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    CKD (chronic kidney disease) stage 4, GFR 15-29 ml/min 07/29/2014    Colon polyps 2014    Depression - situational 07/29/2014    Diabetes type 2 since 2000 07/29/2014    Diabetic neuropathy 07/29/2014    Encounter for blood transfusion     History of cardioversion 10/03/2019    History of hepatitis C, s/p successful Rx w/ SVR24 - 9/2017 07/29/2014    Genotype 1a, treatment naive 10/2014 liver biopsy - grade 1 / stage 1 Completed Harvoni w/ SVR    Hyperlipidemia 07/29/2014    Hypertension     Neuropathy     Organ transplant candidate 07/29/2014    Pancreatitis     S/P cadaveric kidney transplant 11/5/2016. ESRD secondary to HTN/DMII 11/05/2016    Type 2 diabetes mellitus with stage  3a chronic kidney disease, with long-term current use of insulin 07/29/2014     Past Surgical History:   Procedure Laterality Date    ABLATION OF ARRHYTHMOGENIC FOCUS FOR ATRIAL FIBRILLATION N/A 6/11/2024    Procedure: Ablation atrial fibrillation;  Surgeon: Bronson Bowden MD;  Location: [facility] EP LAB;  Service: Cardiology;  Laterality: N/A;  AF, FELICINAO (cx if SR), PVI, RFA, Carto, Gen, GP, 3 Prep    ABLATION, ATRIAL FLUTTER, TYPICAL  6/11/2024    Procedure: Ablation, Atrial Flutter, Typical;  Surgeon: Bronson Bowedn MD;  Location: [facility] EP LAB;  Service: Cardiology;;    APPENDECTOMY      BONE MARROW BIOPSY Left 6/26/2024    Procedure: Biopsy-bone marrow;  Surgeon: Bridger Zapata MD;  Location: [facility] ENDO (4TH FLR);  Service: Oncology;  Laterality: Left;  6/24-pt knows to hold aspirin and eliquis as instructed by hem/onc, precall complete-Kpvt    CARDIOVERSION  6/11/2024    Procedure: Cardioversion;  Surgeon: Bronson Bowden MD;  Location: [facility] EP LAB;  Service: Cardiology;;    CATARACT EXTRACTION W/  INTRAOCULAR LENS IMPLANT Right 3/13/2024    Procedure: EXTRACTION, CATARACT, WITH IOL INSERTION;  Surgeon: Jennifer Palacio MD;  Location: [facility] OR;  Service: Ophthalmology;  Laterality: Right;    CATARACT EXTRACTION W/  INTRAOCULAR LENS IMPLANT Left 4/10/2024    Procedure: EXTRACTION, CATARACT, WITH IOL INSERTION;  Surgeon: Jennifer Palacio MD;  Location: [facility] OR;  Service: Ophthalmology;  Laterality: Left;    COLONOSCOPY N/A 12/21/2020    Procedure: COLONOSCOPY;  Surgeon: Tico Bell MD;  Location: [facility] ENDO (4TH FLR);  Service: Endoscopy;  Laterality: N/A;  ok to hold eliquis per Dr. Valadez, see telephone encounter 11/13/2020-[place]  covid test 12/18 [place]    ECHOCARDIOGRAM,TRANSESOPHAGEAL N/A 2/3/2025    Procedure: Transesophageal echo (FELICIANO) intra-procedure log documentation;  Surgeon: Grayson Lin III, MD;  Location: [facility] EP LAB;  Service: Cardiology;  Laterality: N/A;    KIDNEY TRANSPLANT       TRANSESOPHAGEAL ECHOCARDIOGRAPHY N/A 8/26/2019    Procedure: ECHOCARDIOGRAM, TRANSESOPHAGEAL;  Surgeon: Dolores Diagnostic Provider;  Location: [facility] EP LAB;  Service: Cardiology;  Laterality: N/A;    TRANSESOPHAGEAL ECHOCARDIOGRAPHY N/A 6/11/2024    Procedure: ECHOCARDIOGRAM, TRANSESOPHAGEAL;  Surgeon: Grayson Lin III, MD;  Location: [facility] EP LAB;  Service: Cardiology;  Laterality: N/A;    TREATMENT OF CARDIAC ARRHYTHMIA N/A 8/26/2019    Procedure: CARDIOVERSION;  Surgeon: Bronson Bowden MD;  Location: [facility] EP LAB;  Service: Cardiology;  Laterality: N/A;  AF, FELICIANO, DCCV, MAC, GP, 3 PREP    TREATMENT OF CARDIAC ARRHYTHMIA N/A 2/3/2025    Procedure: Cardioversion or Defibrillation;  Surgeon: Bronson Bowden MD;  Location: [facility] EP LAB;  Service: Cardiology;  Laterality: N/A;  AF, FELICIANO, DCCV, MAC, GP, 3 Prep     Family History   Problem Relation Name Age of Onset    Diabetes Mother      Hypertension Mother      Heart disease Mother          CAD with PCI    Heart disease Father      Cancer Brother 2         one sister with breast cancer    Hypertension Brother 2         one sister with HTN and borderline DM    Stroke Neg Hx      Kidney disease Neg Hx      Colon cancer Neg Hx      Esophageal cancer Neg Hx       Social History[1]  Review of Systems  See HPI for pertinent ROS.      Physical Exam     Initial Vitals [04/29/25 0657]   BP Pulse Resp Temp SpO2   128/64 79 18 98.3 °F (36.8 °C) 97 %      MAP       --         Physical Exam    Constitutional: He appears well-developed and well-nourished.   HENT:   Head: Normocephalic and atraumatic.   Eyes: Pupils are equal, round, and reactive to light. No scleral icterus.   Neck: No JVD present.   Normal range of motion.  Cardiovascular:  Normal rate and regular rhythm.     Exam reveals no gallop and no friction rub.       No murmur heard.  Pulmonary/Chest: Breath sounds normal. No stridor. He has no wheezes. He has no rhonchi. He has no rales.   Abdominal: Abdomen is soft.  There is no abdominal tenderness. There is no rebound and no guarding.   Musculoskeletal:         General: No tenderness or edema.      Cervical back: Normal range of motion.     Neurological: He is alert. GCS score is 15. GCS eye subscore is 4. GCS verbal subscore is 5. GCS motor subscore is 6.   Skin: Skin is warm. Capillary refill takes less than 2 seconds.   Psychiatric: He has a normal mood and affect.         ED Course   Procedures  Labs Reviewed   COMPREHENSIVE METABOLIC PANEL - Abnormal       Result Value    Sodium 135 (*)     Potassium 3.3 (*)     Chloride 99      CO2 25      Glucose 123 (*)     BUN 49 (*)     Creatinine 2.7 (*)     Calcium 8.2 (*)     Protein Total 5.9 (*)     Albumin 2.8 (*)     Bilirubin Total 0.5      ALP 52      AST 14      ALT <5 (*)     Anion Gap 11      eGFR 25 (*)    URINALYSIS, REFLEX TO URINE CULTURE - Abnormal    Color, UA Colorless (*)     Appearance, UA Clear      pH, UA 8.0      Spec Grav UA 1.010      Protein, UA 2+ (*)     Glucose, UA 2+ (*)     Ketones, UA Negative      Bilirubin, UA Negative      Blood, UA Negative      Nitrites, UA Negative      Urobilinogen, UA Negative      Leukocyte Esterase, UA Negative     CBC WITH DIFFERENTIAL - Abnormal    WBC 8.59      RBC 4.02 (*)     HGB 9.0 (*)     HCT 31.0 (*)     MCV 77 (*)     MCH 22.4 (*)     MCHC 29.0 (*)     RDW 19.4 (*)     Platelet Count 115 (*)     MPV        Nucleated RBC 0      Neut % 65.8      Lymph % 17.1 (*)     Mono % 16.1 (*)     Eos % 0.1      Basophil % 0.3      Imm Grans % 0.6 (*)     Neut # 5.65      Lymph # 1.47      Mono # 1.38 (*)     Eos # 0.01      Baso # 0.03      Imm Grans # 0.05 (*)    POCT GLUCOSE - Abnormal    POCT Glucose 114 (*)    POCT GLUCOSE - Abnormal    POCT Glucose 127 (*)    POCT GLUCOSE - Abnormal    POCT Glucose 128 (*)    INFLUENZA A & B BY MOLECULAR - Normal    INFLUENZA A MOLECULAR Negative      INFLUENZA B MOLECULAR  Negative     MAGNESIUM - Normal    Magnesium  2.2     TACROLIMUS  LEVEL - Normal    Tacrolimus 5.9     SARS-COV-2 RNA AMPLIFICATION, QUAL - Normal    SARS COV-2 Molecular Negative     CULTURE, RESPIRATORY   CBC W/ AUTO DIFFERENTIAL    Narrative:     The following orders were created for panel order CBC auto differential.  Procedure                               Abnormality         Status                     ---------                               -----------         ------                     CBC with Differential[[id number]]       Abnormal            Final result                 Please view results for these tests on the individual orders.   GREY TOP URINE HOLD    Extra Tube Hold for add-ons.     URINALYSIS MICROSCOPIC    RBC, UA <1      WBC, UA 0      Bacteria, UA None      Squamous Epithelial Cells, UA <1      Hyaline Casts, UA 0      Microscopic Comment       POCT GLUCOSE, HAND-HELD DEVICE   POCT GLUCOSE, HAND-HELD DEVICE   POCT GLUCOSE, HAND-HELD DEVICE        ECG Results              EKG 12-lead (Final result)        Collection Time Result Time QRS Duration OHS QTC Calculation    04/29/25 07:35:29 04/29/25 07:45:23 112 460                     Final result by Interface, Lab In [facility] (04/29/25 07:45:29)                   Narrative:    Test Reason : Z13.6,    Vent. Rate :  78 BPM     Atrial Rate : 300 BPM     P-R Int :    ms          QRS Dur : 112 ms      QT Int : 404 ms       P-R-T Axes : -89 105   8 degrees    QTcB Int : 460 ms    Atrial flutter  Rightward axis  Nonspecific ST and T wave abnormality  Low septal forces ; Abnormal R wave progression in the precordial leads  -Possible anterior infarct  Possible lateral infarct vs lead reversal  Prolonged QT  Abnormal ECG  When compared with ECG of 16-Apr-2025 13:38,  The axis Shifted right  ST no longer elevated in Anterior leads  Confirmed by Tejas Carter ([id number]) on 4/29/2025 7:45:21 AM    Referred By: AAAREFERRAL SELF           Confirmed By: Tejas Carter                                  Imaging Results               X-Ray Chest  AP Portable (Final result)  Result time 04/29/25 07:45:42      Final result by Tico López MD (04/29/25 07:45:42)                   Impression:      Significant detrimental interval change in the appearance of the chest since 04/22/2025 is observed, relating to the development of airspace consolidation in the right lower lung zone.  Findings must be clinically correlated, but certainly may represent acute pneumonia given the patient's clinical history.  Continued demonstration of modest cardiomegaly.    This report was flagged in Epic as abnormal.      Electronically signed by: Tico López MD  Date:    04/29/2025  Time:    07:45               Narrative:    EXAMINATION:  XR CHEST AP PORTABLE    CLINICAL HISTORY:  Sepsis;    TECHNIQUE:  One view    COMPARISON:  Comparison is made to 04/22/2025.  Clinical information of fever.    FINDINGS:  Heart is enlarged, but allowing for magnification of the cardiomediastinal silhouette related both to projection and to a poorer inspiratory depth level than on the prior exam, I doubt that the heart has changed appreciably in size since that time.  However, there has been a detrimental interval change in the appearance of the chest, as the current examination demonstrates pulmonary parenchymal opacity in the right lower lung zone consistent with the development of airspace consolidation since the prior exam.  Given the patient's clinical history, this may represent acute pneumonia.  The left lung and the remainder of the right lung are clear.  No pleural fluid of any substantial volume is seen on either side.  No pneumothorax.                                       Medications   atorvastatin tablet 80 mg (80 mg Oral Given 5/4/25 0819)   hydrALAZINE tablet 100 mg (100 mg Oral Given 5/4/25 1356)   Lactobacillus rhamnosus GG capsule 1 capsule (1 capsule Oral Given 5/4/25 0820)   meclizine tablet 25 mg (has no administration in time range)   predniSONE tablet 5 mg (5 mg Oral Given  5/4/25 0819)   rivaroxaban tablet 15 mg (15 mg Oral Given 5/4/25 1745)   polyethylene glycol packet 17 g (17 g Oral Not Given 5/4/25 0900)   empagliflozin (Jardiance) tablet 10 mg (10 mg Oral Given 5/4/25 0820)   sodium chloride 0.9% flush 10 mL (has no administration in time range)   albuterol-ipratropium 2.5 mg-0.5 mg/3 mL nebulizer solution 3 mL (has no administration in time range)   melatonin tablet 6 mg (has no administration in time range)   ondansetron disintegrating tablet 8 mg (has no administration in time range)   prochlorperazine injection Soln 5 mg (has no administration in time range)   naloxone 0.4 mg/mL injection 0.02 mg (has no administration in time range)   glucose chewable tablet 16 g (has no administration in time range)   glucose chewable tablet 24 g (has no administration in time range)   dextrose 50% injection 12.5 g (has no administration in time range)   dextrose 50% injection 25 g (has no administration in time range)   glucagon (human recombinant) injection 1 mg (has no administration in time range)   insulin aspart U-100 pen 0-10 Units ( Subcutaneous Not Given 5/2/25 0859)   insulin glargine U-100 (Lantus) pen 15 Units (0 Units Subcutaneous Hold 5/3/25 2100)   azithromycin (ZITHROMAX) 500 mg in 0.9% NaCl 250 mL IVPB (admixture device) (0 mg Intravenous Stopped 4/30/25 1344)   hydrALAZINE injection 15 mg (15 mg Intravenous Given 5/3/25 0456)   gabapentin capsule 300 mg (300 mg Oral Given 5/4/25 0819)   gabapentin capsule 300 mg (300 mg Oral Given 5/4/25 1356)   tacrolimus capsule 1 mg (1 mg Oral Given 5/4/25 1745)   gabapentin capsule 300 mg (300 mg Oral Given 5/3/25 2147)   ceFEPIme injection 2 g (2 g Intravenous Given 5/4/25 1025)   doxazosin tablet 8 mg (8 mg Oral Given 5/4/25 0820)   acetaminophen tablet 1,000 mg (1,000 mg Oral Not Given 5/4/25 0900)   torsemide tablet 40 mg (40 mg Oral Given 5/4/25 1406)   valsartan tablet 320 mg (320 mg Oral Given 5/4/25 1024)   cloNIDine tablet  0.1 mg (has no administration in time range)   vancomycin 2 g in 0.9% sodium chloride 500 mL IVPB (0 mg Intravenous Stopped 4/29/25 1241)   piperacillin-tazobactam (ZOSYN) 4.5 g in D5W 100 mL IVPB (MB+) (0 g Intravenous Stopped 4/29/25 0855)   azithromycin (ZITHROMAX) 500 mg in 0.9% NaCl 250 mL IVPB (admixture device) (0 mg Intravenous Stopped 4/29/25 1034)   lactated ringers bolus 500 mL (0 mLs Intravenous Stopped 4/29/25 1241)   potassium bicarbonate disintegrating tablet 40 mEq (40 mEq Oral Given 4/29/25 1001)   vancomycin 750 mg in 0.9% NaCl 250 mL IVPB (admixture device) (0 mg Intravenous Stopped 4/30/25 1245)   nitroGLYCERIN 2% TD oint ointment 2 inch (2 inches Topical (Top) Given 5/2/25 1400)   potassium chloride SA CR tablet 20 mEq (20 mEq Oral Given 5/2/25 0858)   potassium chloride SA CR tablet 40 mEq (40 mEq Oral Given 5/3/25 0825)   potassium chloride SA CR tablet 40 mEq (40 mEq Oral Given 5/4/25 1024)     Medical Decision Making  Amount and/or Complexity of Data Reviewed  Labs: ordered. Decision-making details documented in ED Course.  Radiology: ordered.    Risk  Prescription drug management.  Decision regarding hospitalization.              Attending Attestation:   Physician Attestation Statement for Resident:  As the supervising MD   Physician Attestation Statement: I have personally seen and examined this patient.   I agree with the above history.  -:   As the supervising MD I agree with the above PE.     As the supervising MD I agree with the above treatment, course, plan, and disposition.                Attending ED Notes:   STAFF ATTENDING PHYSICIAN NOTE:  I have individually/jointly evaluated Ravi Zamorano and discussed their ED management with the resident physician. I have also reviewed their notes, assessments, and procedures documented.  I was present during all critical portions of any procedure(s) performed on Ravi Zamorano.   ____________________  Jose Clarke MD, [facility]  Emergency  "Medicine Staff        ED Course as of 05/04/25 1845   Tue Apr 29, 2025   1010 Comprehensive metabolic panel(!)  Mild hypokalemia, giving replacement, creatinine similar to baseline but uptrending overall, [KB]   1011 Thrombocytopenia new.  No leukocytosis, anemia baseline. [KB]   1011 POCT Venous Blood Gas  No respiratory acidosis, no acute elevated lactate.  Not fellow nightly septic. [KB]      ED Course User Index  [KB] Minnie Good MD    Sepsis Perfusion Assessment: "I attest a sepsis perfusion exam was performed within 6 hours of sepsis, severe sepsis, or septic shock presentation, following fluid resuscitation."          Medical Decision Making:   Clinical Tests:   Sepsis Perfusion Assessment: ""I attest a sepsis perfusion exam was performed within 6 hours of sepsis, severe sepsis, or septic shock presentation, following fluid resuscitation.""         Normal sinus rhythm, rate 78, right axis deviation, no QTC prolongation or ST segment elevation or STEMI equivalent.    See ED course for personal interpretation of workup/ differential.       69-year-old male with history of renal transplant on tacro (2016) presenting for fever T-max of a 103° F at home.  Tylenol given prior to arrival.  In the emergency department, he is afebrile, saturating well on room air.  Physical exam overall reassuring.  Lungs are clear to auscultation, abdomen soft and nontender, sepsis protocol followed given fever and immunosuppressed patient.  Chest x-ray consistent with acute pneumonia.  He was started on vanc, Zosyn, azithromycin given recent pneumonia and recent antibiotic course.  Creatinine 2.7 consistent with similar.  He was given 500 cc gentle IV fluid rehydration.  COVID flu negative.  ED would like to admit for pneumonia.    This patient does have evidence of infective focus  My overall impression is sepsis.  Source: Respiratory  Antibiotics given-   Antibiotics (72h ago, onward)      Start     Stop Route Frequency Ordered " "   25 1000  ceFEPIme injection 2 g         -- IV Every 12 hours (non-standard times) 25 0745          Latest lactate reviewed-  No results for input(s): "LACTATE", "POCLAC" in the last 72 hours.    Organ dysfunction indicated by Acute kidney injury    Fluid challenge Fluid Not Needed - Patient is not hypotensive and/or lactate is less than 4.0.     Post- resuscitation assessment Yes - I attest a sepsis perfusion exam was performed within 6 hours of sepsis, severe sepsis, or septic shock presentation, following fluid resuscitation.      Will Not start Pressors- Levophed for MAP of 65  Source control achieved by: abx      Clinical Impression:  Final diagnoses:  [Z13.6] Screening for cardiovascular condition  [J18.9] Pneumonia of left lower lobe due to infectious organism (Primary)  [D84.9] Immunocompromised  [D69.6] Thrombocytopenia          ED Disposition Condition    Admit                   Minnie Good MD  Resident  25 1012         [1]   Social History  Tobacco Use    Smoking status: Former     Current packs/day: 0.00     Average packs/day: 0.5 packs/day for 32.0 years (16.0 ttl pk-yrs)     Types: Cigarettes     Start date: 1984     Quit date: 2016     Years since quittin.4    Smokeless tobacco: Never   Substance Use Topics    Alcohol use: Yes     Comment: Pt reports occasional beers on Sundays. Pt reports drinking daily prior to ESRD diagnosis.    Drug use: No        Casey Clarke MD  25 [id number]    "

## 2025-04-29 NOTE — SUBJECTIVE & OBJECTIVE
Subjective:     History of Present Illness:   Ravi has ESRD presumed secondary to DM/HTN post  DDRT 11/5/16. his creatinines were in the 1.9-2 ranging late 2024, peaked at 3.0 2/20/25 and  recently have been 2.2-2.5. He presents with fever and noted to have RLL PNA. sepsis protocol initiated and started on broad spectrum abx.  Home IS: tacrolimus 1/1,  mg bid, prednisone 5 mg.    Past Medical and Surgical History: Mr. Zamorano has a past medical history of Anticoagulant long-term use, Anxiety (07/29/2014), Arthritis, atrial fibrillation, Bilateral diabetic retinopathy (2017), Cardioembolic stroke (12/07/2024), CKD (chronic kidney disease) stage 4, GFR 15-29 ml/min (07/29/2014), Colon polyps (2014), Depression - situational (07/29/2014), Diabetes type 2 since 2000 (07/29/2014), Diabetic neuropathy (07/29/2014), Encounter for blood transfusion, History of cardioversion (10/03/2019), History of hepatitis C, s/p successful Rx w/ SVR24 - 9/2017 (07/29/2014), Hyperlipidemia (07/29/2014), Hypertension, Neuropathy, Organ transplant candidate (07/29/2014), Pancreatitis, S/P cadaveric kidney transplant 11/5/2016. ESRD secondary to HTN/DMII (11/05/2016), and Type 2 diabetes mellitus with stage 3a chronic kidney disease, with long-term current use of insulin (07/29/2014).  He has a past surgical history that includes Appendectomy; Kidney transplant; Treatment of cardiac arrhythmia (N/A, 8/26/2019); Transesophageal echocardiography (N/A, 8/26/2019); Colonoscopy (N/A, 12/21/2020); Cataract extraction w/  intraocular lens implant (Right, 3/13/2024); Cataract extraction w/  intraocular lens implant (Left, 4/10/2024); Ablation of arrhythmogenic focus for atrial fibrillation (N/A, 6/11/2024); Cardioversion (6/11/2024); ablation, atrial flutter, typical (6/11/2024); Transesophageal echocardiography (N/A, 6/11/2024); Bone marrow biopsy (Left, 6/26/2024); Treatment of cardiac arrhythmia (N/A, 2/3/2025); and  "echocardiogram,transesophageal (N/A, 2/3/2025).    Past Social and Family History: Mr. Zamorano reports that he quit smoking about 8 years ago. His smoking use included cigarettes. He started smoking about 40 years ago. He has a 16 pack-year smoking history. He has never used smokeless tobacco. He reports current alcohol use. He reports that he does not use drugs.His family history includes Cancer in his brother; Diabetes in his mother; Heart disease in his father and mother; Hypertension in his brother and mother.    Intake/Output - Last 3 Shifts         04/27 0700  04/28 0659 04/28 0700  04/29 0659 04/29 0700  04/30 0659    IV Piggyback   350.2    Total Intake(mL/kg)   350.2 (3.9)    Net   +350.2                    Review of Systems   Constitutional:  Positive for fever.   Respiratory:  Negative for shortness of breath.    Cardiovascular:  Negative for chest pain and leg swelling.   Gastrointestinal:  Negative for abdominal pain.   Genitourinary:  Negative for difficulty urinating.   Skin:  Negative for rash.   Allergic/Immunologic: Positive for immunocompromised state.     Objective:     Vital Signs (Most Recent):  Temp: 98.3 °F (36.8 °C) (04/29/25 1632)  Pulse: 77 (04/29/25 1800)  Resp: 20 (04/29/25 1730)  BP: (!) 195/87 (04/29/25 1800)  SpO2: 98 % (04/29/25 1800) Vital Signs (24h Range):  Temp:  [97.6 °F (36.4 °C)-98.3 °F (36.8 °C)] 98.3 °F (36.8 °C)  Pulse:  [76-89] 77  Resp:  [16-20] 20  SpO2:  [94 %-100 %] 98 %  BP: (128-195)/(64-95) 195/87     Weight: 90.7 kg (200 lb)  Height: 6' 1" (185.4 cm)  Body mass index is 26.39 kg/m².     Physical Exam  Constitutional:       General: He is not in acute distress.     Appearance: Normal appearance. He is not ill-appearing.   Cardiovascular:      Rate and Rhythm: Normal rate and regular rhythm.   Pulmonary:      Effort: Pulmonary effort is normal.      Breath sounds: Normal breath sounds.   Abdominal:      Palpations: Abdomen is soft. There is no mass.      Tenderness: " There is no abdominal tenderness.   Musculoskeletal:      Right lower leg: No edema.      Left lower leg: No edema.   Neurological:      Mental Status: He is alert.          CBC  Recent Labs   Lab 04/09/25  0846 04/29/25  0758 04/29/25  0804   WBC 5.92 8.59  --    HGB 9.9 L 9.0 L  --    POC Hematocrit  --   --  29.1   HCT 35.3 L 31.0 L  --    Platelet Count 155 115 L  --        CHEM & E-LYTES  Recent Labs   Lab 07/11/24  1644 08/29/24  1448 04/09/25  0846 04/29/25  0758   Sodium 135 L   < > 137 135 L   Potassium 3.6   < > 3.7 3.3 L   Chloride 100   < > 101 99   CO2 22 L   < > 25 25   BUN 35 H   < > 37 H 49 H   Creatinine 2.2 H   < > 2.7 H 2.7 H   Glucose 109   < > 183 H 123 H   Calcium 9.5   < > 8.9 8.2 L   Phosphorus Level  --   --  3.5  --    Phosphorus 4.3   < >  --   --    PTH, Intact 163.2 H  --   --   --    Magnesium   --   --   --  2.2   Magnesium  --    < >  --   --    Albumin 3.6   < > 3.1 L 2.8 L   Total Bilirubin  --    < >  --   --    Bilirubin Total  --   --   --  0.5   AST  --    < >  --  14   ALT  --    < >  --  <5 L    < > = values in this interval not displayed.       IMMUNOSUPPRESSANT LEVEL  Recent Labs   Lab 03/19/25  0228 04/09/25  0846 04/29/25  0758   Tacrolimus  --  7.6 5.9   Tacrolimus Lvl 4.8 L  4.8 L  --   --        URINE  Recent Labs   Lab 12/06/24  0812 12/06/24  0926 12/20/24  0839 03/16/25  0357 04/29/25  1228   Protein, UA 3+ A   < >  --  3+ A 2+ A   Leukocyte Esterase, UA  --   --   --   --  Negative   Leukocytes, UA Negative   < >  --  Negative  --    Prot/Creat Ratio, Urine 7.60 H  --  5.57 H  --   --     < > = values in this interval not displayed.

## 2025-04-29 NOTE — ASSESSMENT & PLAN NOTE
Patient has persistent (7 days or more) atrial fibrillation. Patient is currently in atrial fibrillation. [id number] Score: 3. The patients heart rate in the last 24 hours is as follows:  Pulse  Min: 76  Max: 79     Antiarrhythmics       Anticoagulants  rivaroxaban tablet 15 mg, With dinner, Oral    Plan  - Replete lytes with a goal of K>4, Mg >2  - Patient is anticoagulated, see medications listed above.  - Patient's afib is currently controlled

## 2025-04-29 NOTE — ASSESSMENT & PLAN NOTE
This patient has long term use on an anticoagulant with Select Anticoagulant(s): Direct oral anticoagulant: Rivaroxaban (Xarelto). Their long term anticoagulation will be Held or Continued: continued. They are on long term anticoagulation due to Reason for Anticoagulation: Atrial fibrillation.

## 2025-04-29 NOTE — PROGRESS NOTES
"Pharmacokinetic Initial Assessment: IV Vancomycin    Assessment/Plan:    Initiate intravenous vancomycin with loading dose of 2000 mg once followed by a maintenance dose of vancomycin 1250mg IV every 24 hours  Desired empiric serum trough concentration is 15 to 20 mcg/mL  Draw vancomycin trough level 60 min prior to third dose on 04/01 at approximately 09:00  Pharmacy will continue to follow and monitor vancomycin.      Please contact pharmacy at extension [phone] with any questions regarding this assessment.     Thank you for the consult,   Meaghan Leonardo       Patient brief summary:  Ravi Zamorano is a 69 y.o. male initiated on antimicrobial therapy with IV Vancomycin for treatment of suspected lower respiratory infection    Drug Allergies:   Review of patient's allergies indicates:   Allergen Reactions    Nifedipine Other (See Comments)     Gingival hyperplasia       Actual Body Weight:   90.7 Kg    Renal Function:   Estimated Creatinine Clearance: 30 mL/min (A) (based on SCr of 2.7 mg/dL (H)).,     Dialysis Method (if applicable):  N/A    CBC (last 72 hours):  Recent Labs   Lab Result Units 04/29/25  0758   WBC K/uL 8.59   HGB gm/dL 9.0*   HCT % 31.0*   Platelet Count K/uL 115*   Lymph % % 17.1*   Mono % % 16.1*   Eos % % 0.1   Basophil % % 0.3       Metabolic Panel (last 72 hours):  Recent Labs   Lab Result Units 04/29/25  0758   Sodium mmol/L 135*   Potassium mmol/L 3.3*   Chloride mmol/L 99   CO2 mmol/L 25   Glucose mg/dL 123*   BUN mg/dL 49*   Creatinine mg/dL 2.7*   Albumin g/dL 2.8*   Bilirubin Total mg/dL 0.5   ALP unit/L 52   AST unit/L 14   ALT unit/L <5*   Magnesium  mg/dL 2.2       Drug levels (last 3 results):  No results for input(s): "VANCOMYCINRA", "VANCORANDOM", "VANCOMYCINPE", "VANCOPEAK", "VANCOMYCINTR", "VANCOTROUGH" in the last 72 hours.    Microbiologic Results:  Microbiology Results (last 7 days)       Procedure Component Value Units Date/Time    Culture, Respiratory with Gram Stain " [[id number]]     Order Status: Sent Specimen: Respiratory     Influenza A & B by Molecular [[id number]]  (Normal) Collected: 04/29/25 0749    Order Status: Completed Specimen: Nasal Swab Updated: 04/29/25 0836     INFLUENZA A MOLECULAR Negative     INFLUENZA B MOLECULAR  Negative    Blood culture x two cultures. Draw prior to antibiotics. [[id number]] Collected: 04/29/25 0758    Order Status: Sent Specimen: Blood from Peripheral, Antecubital, Left Updated: 04/29/25 0811    Blood culture x two cultures. Draw prior to antibiotics. [[id number]] Collected: 04/29/25 0758    Order Status: Sent Specimen: Blood from Peripheral, Forearm, Left Updated: 04/29/25 0811

## 2025-04-29 NOTE — ASSESSMENT & PLAN NOTE
Patient's blood pressure range in the last 24 hours was: BP  Min: 128/64  Max: 175/83.The patient's inpatient anti-hypertensive regimen is listed below:  Current Antihypertensives  doxazosin tablet 4 mg, Daily, Oral  hydrALAZINE tablet 100 mg, Every 8 hours, Oral  hydroCHLOROthiazide tablet 12.5 mg, Daily, Oral  valsartan tablet 320 mg, Daily, Oral    Plan  - BP is controlled, no changes needed to their regimen

## 2025-04-29 NOTE — ASSESSMENT & PLAN NOTE
Patient's most recent potassium results are listed below.   Recent Labs     04/29/25  0758   K 3.3*     Plan  - Replete potassium per protocol  - Monitor potassium Daily  - Patient's hypokalemia is stable

## 2025-04-29 NOTE — ASSESSMENT & PLAN NOTE
Creatine stable for now. BMP reviewed- noted Estimated Creatinine Clearance: 30 mL/min (A) (based on SCr of 2.7 mg/dL (H)). according to latest data. Based on current GFR, CKD stage is stage 3 - GFR 30-59.  Monitor UOP and serial BMP and adjust therapy as needed. Renally dose meds. Avoid nephrotoxic medications and procedures.

## 2025-04-29 NOTE — ASSESSMENT & PLAN NOTE
"Patient's FSGs are controlled on current medication regimen.  Last A1c reviewed-   Lab Results   Component Value Date    HGBA1C 8.3 (H) 03/27/2025     Most recent fingerstick glucose reviewed- No results for input(s): "POCTGLUCOSE" in the last 24 hours.  Current correctional scale  Medium  Maintain anti-hyperglycemic dose as follows-   Antihyperglycemics (From admission, onward)      Start     Stop Route Frequency Ordered    04/29/25 2100  insulin glargine U-100 (Lantus) pen 15 Units         -- SubQ Nightly 04/29/25 1016    04/29/25 1115  empagliflozin (Jardiance) tablet 10 mg        Question Answer Comment   Does this patient have a diagnosis of heart failure? Yes    Does this patient have type 1 diabetes or diabetic ketoacidosis? No    Does this patient have symptomatic hypotension? No    Is the patient NPO or pending major surgery in next 3 days or less? No        -- Oral Daily 04/29/25 1016    04/29/25 1113  insulin aspart U-100 pen 0-10 Units         -- SubQ Before meals & nightly PRN 04/29/25 1016          Hold Oral hypoglycemics while patient is in the hospital.  "

## 2025-04-29 NOTE — ASSESSMENT & PLAN NOTE
"Patient has a diagnosis of pneumonia. The cause of the pneumonia is suspected to be bacterial in etiology but organism is not known. The pneumonia is stable. The patient has the following signs/symptoms of pneumonia: cough. The patient does not have a current oxygen requirement and the patient does not have a home oxygen requirement. I have reviewed the pertinent imaging. The following cultures have been collected: Blood cultures and Sputum culture The culture results are listed below.     Current antimicrobial regimen consists of the antibiotics listed below. Will monitor patient closely and continue current treatment plan unchanged.    -Given fevers/chills and recurrent pna, reasonable to continue below abx and deescalate when indicated  -f/u sputum, blood cx    Antibiotics (From admission, onward)      Start     Stop Route Frequency Ordered    04/30/25 0900  azithromycin (ZITHROMAX) 500 mg in 0.9% NaCl 250 mL IVPB (admixture device)         05/02/25 0859 IV Every 24 hours (non-standard times) 04/29/25 1016    04/29/25 1145  ceFEPIme injection 1 g         -- IV Every 12 hours (non-standard times) 04/29/25 1037    04/29/25 1114  vancomycin - pharmacy to dose  (vancomycin IVPB (PEDS and ADULTS))        Placed in "And" Linked Group    -- IV pharmacy to manage frequency 04/29/25 1016    04/29/25 0800  vancomycin 2 g in 0.9% sodium chloride 500 mL IVPB         04/29/25 1959 IV ED 1 Time 04/29/25 0753            Microbiology Results (last 7 days)       Procedure Component Value Units Date/Time    Culture, Respiratory with Gram Stain [[id number]]     Order Status: Sent Specimen: Respiratory     Influenza A & B by Molecular [[id number]]  (Normal) Collected: 04/29/25 0749    Order Status: Completed Specimen: Nasal Swab Updated: 04/29/25 0836     INFLUENZA A MOLECULAR Negative     INFLUENZA B MOLECULAR  Negative    Blood culture x two cultures. Draw prior to antibiotics. [[id number]] Collected: 04/29/25 0758    Order " Status: Sent Specimen: Blood from Peripheral, Antecubital, Left Updated: 04/29/25 0811    Blood culture x two cultures. Draw prior to antibiotics. [[id number]] Collected: 04/29/25 0758    Order Status: Sent Specimen: Blood from Peripheral, Forearm, Left Updated: 04/29/25 0895

## 2025-04-30 PROBLEM — R04.2 HEMOPTYSIS: Status: ACTIVE | Noted: 2025-04-30

## 2025-04-30 LAB
ABSOLUTE EOSINOPHIL (OHS): 0.03 K/UL
ABSOLUTE MONOCYTE (OHS): 0.85 K/UL (ref 0.3–1)
ABSOLUTE NEUTROPHIL COUNT (OHS): 2.99 K/UL (ref 1.8–7.7)
ANION GAP (OHS): 10 MMOL/L (ref 8–16)
BASOPHILS # BLD AUTO: 0.02 K/UL
BASOPHILS NFR BLD AUTO: 0.4 %
BUN SERPL-MCNC: 46 MG/DL (ref 8–23)
CALCIUM SERPL-MCNC: 8 MG/DL (ref 8.7–10.5)
CHLORIDE SERPL-SCNC: 101 MMOL/L (ref 95–110)
CO2 SERPL-SCNC: 24 MMOL/L (ref 23–29)
CREAT SERPL-MCNC: 2.9 MG/DL (ref 0.5–1.4)
ERYTHROCYTE [DISTWIDTH] IN BLOOD BY AUTOMATED COUNT: 19.7 % (ref 11.5–14.5)
GFR SERPLBLD CREATININE-BSD FMLA CKD-EPI: 23 ML/MIN/1.73/M2
GLUCOSE SERPL-MCNC: 104 MG/DL (ref 70–110)
HCT VFR BLD AUTO: 29.9 % (ref 40–54)
HGB BLD-MCNC: 8.7 GM/DL (ref 14–18)
IMM GRANULOCYTES # BLD AUTO: 0.02 K/UL (ref 0–0.04)
IMM GRANULOCYTES NFR BLD AUTO: 0.4 % (ref 0–0.5)
LYMPHOCYTES # BLD AUTO: 1.12 K/UL (ref 1–4.8)
MAGNESIUM SERPL-MCNC: 2.3 MG/DL (ref 1.6–2.6)
MCH RBC QN AUTO: 22.3 PG (ref 27–31)
MCHC RBC AUTO-ENTMCNC: 29.1 G/DL (ref 32–36)
MCV RBC AUTO: 77 FL (ref 82–98)
NUCLEATED RBC (/100WBC) (OHS): 0 /100 WBC
PHOSPHATE SERPL-MCNC: 3.2 MG/DL (ref 2.7–4.5)
PLATELET # BLD AUTO: 104 K/UL (ref 150–450)
PMV BLD AUTO: ABNORMAL FL
POCT GLUCOSE: 120 MG/DL (ref 70–110)
POCT GLUCOSE: 156 MG/DL (ref 70–110)
POCT GLUCOSE: 189 MG/DL (ref 70–110)
POCT GLUCOSE: 96 MG/DL (ref 70–110)
POTASSIUM SERPL-SCNC: 3.9 MMOL/L (ref 3.5–5.1)
RBC # BLD AUTO: 3.9 M/UL (ref 4.6–6.2)
RELATIVE EOSINOPHIL (OHS): 0.6 %
RELATIVE LYMPHOCYTE (OHS): 22.3 % (ref 18–48)
RELATIVE MONOCYTE (OHS): 16.9 % (ref 4–15)
RELATIVE NEUTROPHIL (OHS): 59.4 % (ref 38–73)
SODIUM SERPL-SCNC: 135 MMOL/L (ref 136–145)
TACROLIMUS BLD-MCNC: 3.3 NG/ML (ref 5–15)
VANCOMYCIN SERPL-MCNC: 13.2 UG/ML (ref ?–80)
WBC # BLD AUTO: 5.03 K/UL (ref 3.9–12.7)

## 2025-04-30 PROCEDURE — 25000003 PHARM REV CODE 250: Mod: HCNC | Performed by: STUDENT IN AN ORGANIZED HEALTH CARE EDUCATION/TRAINING PROGRAM

## 2025-04-30 PROCEDURE — 25000003 PHARM REV CODE 250: Mod: HCNC

## 2025-04-30 PROCEDURE — 36415 COLL VENOUS BLD VENIPUNCTURE: CPT | Mod: HCNC

## 2025-04-30 PROCEDURE — 63600175 PHARM REV CODE 636 W HCPCS: Mod: HCNC | Performed by: STUDENT IN AN ORGANIZED HEALTH CARE EDUCATION/TRAINING PROGRAM

## 2025-04-30 PROCEDURE — 63600175 PHARM REV CODE 636 W HCPCS: Mod: HCNC

## 2025-04-30 PROCEDURE — 63600175 PHARM REV CODE 636 W HCPCS: Mod: HCNC | Performed by: HOSPITALIST

## 2025-04-30 PROCEDURE — 80202 ASSAY OF VANCOMYCIN: CPT | Mod: HCNC

## 2025-04-30 PROCEDURE — 99222 1ST HOSP IP/OBS MODERATE 55: CPT | Mod: HCNC,,, | Performed by: INTERNAL MEDICINE

## 2025-04-30 PROCEDURE — 85025 COMPLETE CBC W/AUTO DIFF WBC: CPT | Mod: HCNC | Performed by: STUDENT IN AN ORGANIZED HEALTH CARE EDUCATION/TRAINING PROGRAM

## 2025-04-30 PROCEDURE — 80197 ASSAY OF TACROLIMUS: CPT | Mod: HCNC | Performed by: STUDENT IN AN ORGANIZED HEALTH CARE EDUCATION/TRAINING PROGRAM

## 2025-04-30 PROCEDURE — 36415 COLL VENOUS BLD VENIPUNCTURE: CPT | Mod: HCNC | Performed by: STUDENT IN AN ORGANIZED HEALTH CARE EDUCATION/TRAINING PROGRAM

## 2025-04-30 PROCEDURE — 21400001 HC TELEMETRY ROOM: Mod: HCNC

## 2025-04-30 PROCEDURE — 80048 BASIC METABOLIC PNL TOTAL CA: CPT | Mod: HCNC | Performed by: STUDENT IN AN ORGANIZED HEALTH CARE EDUCATION/TRAINING PROGRAM

## 2025-04-30 PROCEDURE — 83735 ASSAY OF MAGNESIUM: CPT | Mod: HCNC | Performed by: STUDENT IN AN ORGANIZED HEALTH CARE EDUCATION/TRAINING PROGRAM

## 2025-04-30 PROCEDURE — 84100 ASSAY OF PHOSPHORUS: CPT | Mod: HCNC | Performed by: STUDENT IN AN ORGANIZED HEALTH CARE EDUCATION/TRAINING PROGRAM

## 2025-04-30 RX ORDER — GABAPENTIN 300 MG/1
900 CAPSULE ORAL NIGHTLY
Status: DISCONTINUED | OUTPATIENT
Start: 2025-04-30 | End: 2025-05-01

## 2025-04-30 RX ORDER — GABAPENTIN 300 MG/1
300 CAPSULE ORAL
Status: DISCONTINUED | OUTPATIENT
Start: 2025-05-01 | End: 2025-05-08 | Stop reason: HOSPADM

## 2025-04-30 RX ORDER — GABAPENTIN 300 MG/1
300 CAPSULE ORAL DAILY
Status: DISCONTINUED | OUTPATIENT
Start: 2025-05-01 | End: 2025-05-08 | Stop reason: HOSPADM

## 2025-04-30 RX ADMIN — Medication 1 CAPSULE: at 10:04

## 2025-04-30 RX ADMIN — CEFEPIME 1 G: 1 INJECTION, POWDER, FOR SOLUTION INTRAMUSCULAR; INTRAVENOUS at 10:04

## 2025-04-30 RX ADMIN — VANCOMYCIN HYDROCHLORIDE 750 MG: 750 INJECTION, POWDER, LYOPHILIZED, FOR SOLUTION INTRAVENOUS at 11:04

## 2025-04-30 RX ADMIN — POLYETHYLENE GLYCOL 3350 17 G: 17 POWDER, FOR SOLUTION ORAL at 10:04

## 2025-04-30 RX ADMIN — GABAPENTIN 300 MG: 300 CAPSULE ORAL at 10:04

## 2025-04-30 RX ADMIN — GABAPENTIN 300 MG: 300 CAPSULE ORAL at 04:04

## 2025-04-30 RX ADMIN — DOXAZOSIN 4 MG: 2 TABLET ORAL at 10:04

## 2025-04-30 RX ADMIN — ATORVASTATIN CALCIUM 80 MG: 40 TABLET, FILM COATED ORAL at 10:04

## 2025-04-30 RX ADMIN — HYDRALAZINE HYDROCHLORIDE 15 MG: 20 INJECTION, SOLUTION INTRAMUSCULAR; INTRAVENOUS at 12:04

## 2025-04-30 RX ADMIN — GABAPENTIN 300 MG: 300 CAPSULE ORAL at 12:04

## 2025-04-30 RX ADMIN — HYDRALAZINE HYDROCHLORIDE 100 MG: 50 TABLET ORAL at 06:04

## 2025-04-30 RX ADMIN — AZITHROMYCIN MONOHYDRATE 500 MG: 500 INJECTION, POWDER, LYOPHILIZED, FOR SOLUTION INTRAVENOUS at 12:04

## 2025-04-30 RX ADMIN — PREDNISONE 5 MG: 5 TABLET ORAL at 10:04

## 2025-04-30 RX ADMIN — HYDROCHLOROTHIAZIDE 12.5 MG: 12.5 TABLET ORAL at 10:04

## 2025-04-30 RX ADMIN — VALSARTAN 320 MG: 160 TABLET, FILM COATED ORAL at 10:04

## 2025-04-30 RX ADMIN — RIVAROXABAN 15 MG: 15 TABLET, FILM COATED ORAL at 04:04

## 2025-04-30 RX ADMIN — EMPAGLIFLOZIN 10 MG: 10 TABLET, FILM COATED ORAL at 10:04

## 2025-04-30 RX ADMIN — GABAPENTIN 900 MG: 300 CAPSULE ORAL at 08:04

## 2025-04-30 RX ADMIN — CEFEPIME 1 G: 1 INJECTION, POWDER, FOR SOLUTION INTRAMUSCULAR; INTRAVENOUS at 08:04

## 2025-04-30 RX ADMIN — HYDRALAZINE HYDROCHLORIDE 100 MG: 50 TABLET ORAL at 01:04

## 2025-04-30 RX ADMIN — HYDRALAZINE HYDROCHLORIDE 100 MG: 50 TABLET ORAL at 08:04

## 2025-04-30 NOTE — PLAN OF CARE
Problem: Adult Inpatient Plan of Care  Goal: Plan of Care Review  Outcome: Progressing  Goal: Patient-Specific Goal (Individualized)  Outcome: Progressing  Goal: Absence of Hospital-Acquired Illness or Injury  Outcome: Progressing  Goal: Optimal Comfort and Wellbeing  Outcome: Progressing  Goal: Readiness for Transition of Care  Outcome: Progressing     Problem: Infection  Goal: Absence of Infection Signs and Symptoms  Outcome: Progressing     Problem: Acute Kidney Injury/Impairment  Goal: Fluid and Electrolyte Balance  Outcome: Progressing  Goal: Improved Oral Intake  Outcome: Progressing  Goal: Effective Renal Function  Outcome: Progressing     Problem: Pneumonia  Goal: Fluid Balance  Outcome: Progressing  Goal: Resolution of Infection Signs and Symptoms  Outcome: Progressing  Goal: Effective Oxygenation and Ventilation  Outcome: Progressing     Problem: Diabetes Comorbidity  Goal: Blood Glucose Level Within Targeted Range  Outcome: Progressing        Pt AAO x 4. Patient rested comfortably with no significant changes during the shift, remains free from falls and injuries. Side rails up x2, bed low and locked, call light and personal belongings within reach. VS remain stable and respirations even and unlabored. Hourly rounding to ensure safety and comfort.  No grimace, cries or other signs of acute distress. Questions and concerns addressed and answered. Medication compliance. Pt remains on continuous cardiac monitoring. Family remains at bedside. HOB and pillows adjusted for comfort. Reviewed importance of safety barriers and calling for assistance prn. Verbalized understanding and states he will call prn for needs. Plan of care discussed.

## 2025-04-30 NOTE — HOSPITAL COURSE
Patient admitted due to fever, fatigue and chills at home. He has not had any cough or sob at home. He was recently treated for PNA at the end of march and was feeling a lot better since then, until he had a sudden onset of symptoms. He did describe some productive cough for several morning and the mucus he coughed op had rust colored blood as well as bright red blood sometimes. Theses episodes only happened shortly after wakening in the morning and didn't persist during the day, no associated sob or other symptoms during those times. Denies nasal drainage, congestion, or nose bleeds.       Started on broad spectrum and feeling a lot better. Still on RA and no cough, wheezes or sob     CT chest ordered to get a better look at the consolidations and possible hemoptysis, will considered pulmonology and/or ID consulted to help direct care    5/1  PMH of DM, renal transplant 2016, CHFpEF, AF/AFL (PVI/PWI/CTI 6/2024), CVA, recurrent PNA who presents with fevers, chills.  States that symptoms are similar to last month when he was admitted for pneumonia. No other pna symptoms despite CXR changes.BC x2 4/29 NGTD. vancomycins discontinued.  respiratory cultures pending.  CT chest ordered -RML GGO concerning for pneumonia possible hiatal hernia and possible anterior vertebral osteophyte near mid esophagus.  SLP consulted. MBSS and barium esophogram to assess for hiatal hernia (second episode of pneumonia since March 2025) and recurrent aspiration as a cause of repeat pneumonia. continue cefepime. completed azithromycin. denies hemoptysis this admission. last episode 2 weeks back. resumed prograf 1mg BID. continue prednisone. hold cellcept for now   5/2 s/p FEES. SLP recs regular diet/thin liquids CT chest -Bilateral multilobar consolidations and ground-glass opacities associated with bronchial thickening suggestive of infectious/inflammatory process. Right moderate and left small pleural effusions.  ID consulted for recurrent  pneumonia/ immunosuppressed/ hemoptysis.  SBP 160s-180s. coreg previously due to bradycardia during hospitalization 2/202. discontinued Spironolactone 25 mg daily -  3/2024 due to getting LH and dizzy . procardia listed as allergy. Doxazosin increased to 8mg daiy. . HCTZ discontinued. started on torsemide 40mg daily. ID eval  -  resp infection panel and urine legionella  MRSA nares, resp culture  5/3 SBP 200s overnight. started on IV hydralzine. received IV hydralazine 15mg x 2 doses. BP  132/67 . K replaced. RIP negative. EKG -Atrial fibrillation with premature ventricular or aberrantly conducted complexe. Low voltage QRS. KTM f/u - Cr elevated from his baseline - suspicion for failing allograft and approaching need for dialysis .  hold valsartan for now. monitor BP  5/4 K replaced. SBP 200s overnight. received hydralazine 15mg x 1 overnight. discussed with KTM. Cr stable. resume valsartan. Clonidine 0.1 mg PRN for SBP > 170mm HG. continue  cefepime for 7 day course (end date 5/5/25)  5/5 complete cefepime today. CR stable. SBP 180s overnight. started on clonidine patch and  eplerenone 50mg daily.   per radiology, small hiatal hernia visible on their CT chest 5/1/25. reports left LE pain with ambulation. WENDI ordered   5/6 SBP 160s. K replaced.  monitor. WENDI -Right Leg: Segmental pressures and PVR waveforms suggest moderate peripheral arterial occlusive disease. Rt Great Toe: The PPG waveform as described above. - TBI of 0.55 with a great toe pressure of 95 mmHg is noted. Left Leg: Segmental pressures and PVR waveforms suggest moderate peripheral arterial occlusive disease. Lt Great Toe: The PPG waveform as described above. - TBI of 0.59 with a great toe pressure of 101 mmHg is noted.  Endocrine consulted for insulin pump. Hold Cellcept - resume after one month. monitor blood pressure  5/7 Cr trended up to 3.4. Valsartan held. Sono transplant kidney-Mildly elevated intraparenchymal resistive indices which may be  seen with medical renal disease, rejection, or drug toxicity. . BP improved. KTM f/u  - recs 500cc bolus of LR, changing his torsemide back to HCTZ 25mg daily, and restarting his valsartan. monitor renal function in AM     Kidney transplant satisfied with patient's renal function recommended holding CellCept until follow-up given recurrent pneumonia but continuation of home tacrolimus dose at discharge.  Antihypertensive regimen adjusted while inpatient continued at discharge.      Patient deemed appropriate for discharge. I personally saw, examined, and evaluated patient prior to departure. Plan discussed with patient, who was agreeable and amenable; medications were discussed and reviewed, outpatient follow-up scheduled, ER precautions were given, all questions were answered to the patient's satisfaction, and Ravi LUANN Zamorano was subsequently discharged.

## 2025-04-30 NOTE — ASSESSMENT & PLAN NOTE
Anemia is likely due to chronic disease due to Chronic Kidney Disease. Most recent hemoglobin and hematocrit are listed below.  Recent Labs     04/29/25  0758 04/29/25  0804 04/30/25  0619   HGB 9.0*  --  8.7*   HCT 31.0* 29.1 29.9*     Plan  - Monitor serial CBC: Daily  - Transfuse PRBC if patient becomes hemodynamically unstable, symptomatic or H/H drops below 7/21.  - Patient has not received any PRBC transfusions to date  - Patient's anemia is currently stable

## 2025-04-30 NOTE — SUBJECTIVE & OBJECTIVE
Interval History: NAEO, overall he is feeling a lot better after starting antibiotics. Remains on RA without any significant respiratory symptoms    Review of Systems   Constitutional:  Negative for fatigue and fever.   Respiratory:  Negative for cough, chest tightness, shortness of breath, wheezing and stridor.    Cardiovascular:  Negative for chest pain and palpitations.   Gastrointestinal:  Negative for abdominal pain, constipation, diarrhea, nausea and vomiting.     Objective:     Vital Signs (Most Recent):  Temp: 98.3 °F (36.8 °C) (04/30/25 1059)  Pulse: 81 (04/30/25 1135)  Resp: 18 (04/30/25 1059)  BP: (!) 169/78 (04/30/25 1059)  SpO2: 96 % (04/30/25 1059) Vital Signs (24h Range):  Temp:  [97.8 °F (36.6 °C)-98.6 °F (37 °C)] 98.3 °F (36.8 °C)  Pulse:  [74-82] 81  Resp:  [18-20] 18  SpO2:  [93 %-100 %] 96 %  BP: (169-205)/() 169/78     Weight: 90.7 kg (200 lb)  Body mass index is 26.39 kg/m².  No intake or output data in the 24 hours ending 04/30/25 1333      Physical Exam  Constitutional:       Appearance: Normal appearance.   HENT:      Head: Normocephalic and atraumatic.   Cardiovascular:      Rate and Rhythm: Normal rate.      Pulses: Normal pulses.      Heart sounds: No murmur heard.  Pulmonary:      Effort: Pulmonary effort is normal. No respiratory distress.      Breath sounds: Normal breath sounds. No stridor. No wheezing, rhonchi or rales.   Abdominal:      General: Abdomen is flat. Bowel sounds are normal. There is no distension.      Tenderness: There is no abdominal tenderness.   Skin:     General: Skin is warm and dry.   Neurological:      General: No focal deficit present.      Mental Status: He is alert and oriented to person, place, and time.               Significant Labs: All pertinent labs within the past 24 hours have been reviewed.    Significant Imaging: I have reviewed all pertinent imaging results/findings within the past 24 hours.

## 2025-04-30 NOTE — PROGRESS NOTES
[facility] Medicine  Progress Note    Patient Name: Ravi Zamorano  MRN: [id number]  Patient Class: IP- Inpatient   Admission Date: 4/29/2025  Length of Stay: 1 days  Attending Physician: Franky Gibbons DO  Primary Care Provider: Mima Mack MD        Subjective     Principal Problem:Community acquired bacterial pneumonia        HPI:  This is a 69-year-old male with a history of DM, renal transplant 2016, CHFpEF, AF/AFL (PVI/PWI/CTI 6/2024), CVA, recurrent PNA who presents with fevers, chills.  States that symptoms are similar to last month when he was admitted for pneumonia.  Was in his usual state of health until last night when he started experiencing intermittent fevers, chills.  Wife states she also noticed that he has had increased intermittent cough over the past day or so.  Patient did not notice this.  Denies any production.  Also denies any shortness for breath, wheezing, chest pain, abdominal pain, diarrhea, nausea, vomiting, dysuria.  Compliant with all medications.  Trace lower extremity swelling is chronic.    ED course:  On room air.  Lab work significant for Cr 2.7, same as previous. CXR showed development of RLL consolidation.  Received IV vancomycin, azithromycin, Zosyn.  Septic workup obtained.    Overview/Hospital Course:  Patient admitted due to fever, fatigue and chills at home. He has not had any cough or sob at home. He was recently treated for PNA at the end of march and was feeling a lot better since then, until he had a sudden onset of symptoms. He did describe some productive cough for several morning and the mucus he coughed op had rust colored blood as well as bright red blood sometimes. Theses episodes only happened shortly after wakening in the morning and didn't persist during the day, no associated sob or other symptoms during those times. Denies nasal drainage, congestion, or nose bleeds.       Started on broad spectrum and feeling a lot better.  Still on RA and no cough, wheezes or sob     CT chest ordered to get a better look at the consolidations and possible hemoptysis, will considered pulmonology and/or ID consulted to help direct care    Interval History: NAEO, overall he is feeling a lot better after starting antibiotics. Remains on RA without any significant respiratory symptoms    Review of Systems   Constitutional:  Negative for fatigue and fever.   Respiratory:  Negative for cough, chest tightness, shortness of breath, wheezing and stridor.    Cardiovascular:  Negative for chest pain and palpitations.   Gastrointestinal:  Negative for abdominal pain, constipation, diarrhea, nausea and vomiting.     Objective:     Vital Signs (Most Recent):  Temp: 98.3 °F (36.8 °C) (04/30/25 1059)  Pulse: 81 (04/30/25 1135)  Resp: 18 (04/30/25 1059)  BP: (!) 169/78 (04/30/25 1059)  SpO2: 96 % (04/30/25 1059) Vital Signs (24h Range):  Temp:  [97.8 °F (36.6 °C)-98.6 °F (37 °C)] 98.3 °F (36.8 °C)  Pulse:  [74-82] 81  Resp:  [18-20] 18  SpO2:  [93 %-100 %] 96 %  BP: (169-205)/() 169/78     Weight: 90.7 kg (200 lb)  Body mass index is 26.39 kg/m².  No intake or output data in the 24 hours ending 04/30/25 1333      Physical Exam  Constitutional:       Appearance: Normal appearance.   HENT:      Head: Normocephalic and atraumatic.   Cardiovascular:      Rate and Rhythm: Normal rate.      Pulses: Normal pulses.      Heart sounds: No murmur heard.  Pulmonary:      Effort: Pulmonary effort is normal. No respiratory distress.      Breath sounds: Normal breath sounds. No stridor. No wheezing, rhonchi or rales.   Abdominal:      General: Abdomen is flat. Bowel sounds are normal. There is no distension.      Tenderness: There is no abdominal tenderness.   Skin:     General: Skin is warm and dry.   Neurological:      General: No focal deficit present.      Mental Status: He is alert and oriented to person, place, and time.               Significant Labs: All pertinent labs  "within the past 24 hours have been reviewed.    Significant Imaging: I have reviewed all pertinent imaging results/findings within the past 24 hours.      Assessment & Plan  Community acquired bacterial pneumonia  Hemoptysis    Patient has a diagnosis of pneumonia. The cause of the pneumonia is suspected to be bacterial in etiology but organism is not known. The pneumonia is stable. The patient has the following signs/symptoms of pneumonia: cough. The patient does not have a current oxygen requirement and the patient does not have a home oxygen requirement. I have reviewed the pertinent imaging. The following cultures have been collected: Blood cultures and Sputum culture The culture results are listed below.     Current antimicrobial regimen consists of the antibiotics listed below. Will monitor patient closely and continue current treatment plan unchanged.    -Given fevers/chills and recurrent pna, reasonable to continue below abx and deescalate when indicated  -f/u sputum, blood cx    Antibiotics (From admission, onward)      Start     Stop Route Frequency Ordered    04/30/25 0900  azithromycin (ZITHROMAX) 500 mg in 0.9% NaCl 250 mL IVPB (admixture device)         05/01/25 1259 IV Every 24 hours (non-standard times) 04/29/25 1016    04/29/25 1145  ceFEPIme injection 1 g         -- IV Every 12 hours (non-standard times) 04/29/25 1037    04/29/25 1114  vancomycin - pharmacy to dose  (vancomycin IVPB (PEDS and ADULTS))        Placed in "And" Linked Group    -- IV pharmacy to manage frequency 04/29/25 1016            Microbiology Results (last 7 days)       Procedure Component Value Units Date/Time    MRSA Screen by PCR [[id number]]     Order Status: Sent Specimen: Nasal Swab     Culture, Respiratory with Gram Stain [[id number]]     Order Status: Sent Specimen: Respiratory     Blood culture x two cultures. Draw prior to antibiotics. [[id number]]  (Normal) Collected: 04/29/25 075    Order Status: Completed Specimen: " Blood from Peripheral, Forearm, Left Updated: 04/30/25 0007     Blood Culture No Growth After 6 Hours    Blood culture x two cultures. Draw prior to antibiotics. [[id number]]  (Normal) Collected: 04/29/25 0758    Order Status: Completed Specimen: Blood from Peripheral, Antecubital, Left Updated: 04/30/25 0007     Blood Culture No Growth After 6 Hours    Culture, Respiratory with Gram Stain [[id number]]     Order Status: Sent Specimen: Respiratory     Influenza A & B by Molecular [[id number]]  (Normal) Collected: 04/29/25 0749    Order Status: Completed Specimen: Nasal Swab Updated: 04/29/25 0836     INFLUENZA A MOLECULAR Negative     INFLUENZA B MOLECULAR  Negative            Will continue abx - broad spectrum for now  - no cough, so likely unable to get resp cultures  - will get CT scan of the chest to get a better look at consolidation  - hemoptysis - has been ongoing for a few weeks at home. No signs of other areas of bleeding. Already on AC so doubt PE (no other symptoms either). Concern for ongoing PNA possibly chronic MAC or other chronic infection  - based on results of CT can consider pulm consult for bronch or ID consult for abx input    Continue with broad spectrum abx for now. Monitor condition     Type 2 diabetes mellitus with stage 3a chronic kidney disease, with long-term current use of insulin  Creatine stable for now. BMP reviewed- noted Estimated Creatinine Clearance: 27.9 mL/min (A) (based on SCr of 2.9 mg/dL (H)). according to latest data. Based on current GFR, CKD stage is stage 3 - GFR 30-59.  Monitor UOP and serial BMP and adjust therapy as needed. Renally dose meds. Avoid nephrotoxic medications and procedures.  Essential hypertension  Patient's blood pressure range in the last 24 hours was: BP  Min: 169/78  Max: 205/148.The patient's inpatient anti-hypertensive regimen is listed below:  Current Antihypertensives  doxazosin tablet 4 mg, Daily, Oral  hydrALAZINE tablet 100 mg, Every 8 hours,  "Oral  hydroCHLOROthiazide tablet 12.5 mg, Daily, Oral  valsartan tablet 320 mg, Daily, Oral  hydrALAZINE injection 15 mg, Every 6 hours PRN, Intravenous    Plan  - BP is controlled, no changes needed to their regimen  Hyperlipidemia  Cont statin    Chronic combined systolic (congestive) and diastolic (congestive) heart failure  Patient has Diastolic (HFpEF) heart failure that is Chronic. On presentation their CHF was well compensated. Most recent BNP and echo results are listed below.  No results for input(s): "BNP" in the last 72 hours.  Latest ECHO  Results for orders placed during the hospital encounter of 02/20/25    Echo    Interpretation Summary    Left Ventricle: There is mild concentric hypertrophy. There is low normal systolic function with a visually estimated ejection fraction of 50 - 55%. Grade III diastolic dysfunction.    Left Ventricle: The left ventricle is at the upper limits of normal in size. Mildly increased wall thickness. There is mild concentric hypertrophy. Regional wall motion abnormalities present. See diagram for wall motion findings. There is low normal systolic function with a visually estimated ejection fraction of 50 - 55%. Ejection fraction is approximately 53%. Grade III diastolic dysfunction.    Right Ventricle: Systolic function is borderline low despite the low TAPSE.    Left Atrium: Left atrium is moderately dilated.    Aortic Valve: The aortic valve is a trileaflet valve. There is mild aortic valve sclerosis. There is moderate annular calcification present. There is mild aortic regurgitation with a centrally directed jet.    Mitral Valve: There is mild regurgitation with a centrally directed jet.    Pericardium: There is a trivial posterior effusion and under the RA.    Tricuspid Valve: There is mild regurgitation.    Pulmonary Artery: The estimated pulmonary artery systolic pressure is 30 mmHg.    Current Heart Failure Medications  hydrALAZINE tablet 100 mg, Every 8 hours, " Oral  hydroCHLOROthiazide tablet 12.5 mg, Daily, Oral  valsartan tablet 320 mg, Daily, Oral  empagliflozin (Jardiance) tablet 10 mg, Daily, Oral  hydrALAZINE injection 15 mg, Every 6 hours PRN, Intravenous    Plan  - Monitor strict I&Os and daily weights.    - Place on telemetry  - Low sodium diet  - Cardiology has not been consulted  - The patient's volume status is at their baseline  S/P cadaveric kidney transplant 11/5/2016. ESRD secondary to HTN/DMII  -KTM consulted  -daily tacro level, dosing per KTM  -Cont prednisone    CKD IV (severe)  Creatine stable for now. BMP reviewed- noted Estimated Creatinine Clearance: 27.9 mL/min (A) (based on SCr of 2.9 mg/dL (H)). according to latest data. Based on current GFR, CKD stage is stage 3 - GFR 30-59.  Monitor UOP and serial BMP and adjust therapy as needed. Renally dose meds. Avoid nephrotoxic medications and procedures.  Type 2 diabetes mellitus  Patient's FSGs are controlled on current medication regimen.  Last A1c reviewed-   Lab Results   Component Value Date    HGBA1C 8.3 (H) 03/27/2025     Most recent fingerstick glucose reviewed-   Recent Labs   Lab 04/29/25  1659 04/29/25  2145 04/30/25  0805 04/30/25  1235   POCTGLUCOSE 127* 128* 96 120*     Current correctional scale  Medium  Maintain anti-hyperglycemic dose as follows-   Antihyperglycemics (From admission, onward)      Start     Stop Route Frequency Ordered    04/29/25 2100  insulin glargine U-100 (Lantus) pen 15 Units         -- SubQ Nightly 04/29/25 1016    04/29/25 1115  empagliflozin (Jardiance) tablet 10 mg        Question Answer Comment   Does this patient have a diagnosis of heart failure? Yes    Does this patient have type 1 diabetes or diabetic ketoacidosis? No    Does this patient have symptomatic hypotension? No    Is the patient NPO or pending major surgery in next 3 days or less? No        -- Oral Daily 04/29/25 1016    04/29/25 1113  insulin aspart U-100 pen 0-10 Units         -- SubQ Before  meals & nightly PRN 04/29/25 1016          Hold Oral hypoglycemics while patient is in the hospital.    Blood glucose acceptable  Persistent atrial fibrillation  Patient has persistent (7 days or more) atrial fibrillation. Patient is currently in atrial fibrillation. [id number] Score: 3. The patients heart rate in the last 24 hours is as follows:  Pulse  Min: 74  Max: 82     Antiarrhythmics       Anticoagulants  rivaroxaban tablet 15 mg, With dinner, Oral    Plan  - Replete lytes with a goal of K>4, Mg >2  - Patient is anticoagulated, see medications listed above.  - Patient's afib is currently controlled  Anticoagulant long-term use  This patient has long term use on an anticoagulant with Select Anticoagulant(s): Direct oral anticoagulant: Rivaroxaban (Xarelto). Their long term anticoagulation will be Held or Continued: continued. They are on long term anticoagulation due to Reason for Anticoagulation: Atrial fibrillation.   BPH with urinary obstruction  Doxazosin   Immunosuppressed status  Prophylactic immunotherapy  Immunocompromised state due to drug therapy  KTM consutled for assistance with management     Anemia of chronic disease  Anemia is likely due to chronic disease due to Chronic Kidney Disease. Most recent hemoglobin and hematocrit are listed below.  Recent Labs     04/29/25  0758 04/29/25  0804 04/30/25  0619   HGB 9.0*  --  8.7*   HCT 31.0* 29.1 29.9*     Plan  - Monitor serial CBC: Daily  - Transfuse PRBC if patient becomes hemodynamically unstable, symptomatic or H/H drops below 7/21.  - Patient has not received any PRBC transfusions to date  - Patient's anemia is currently stable  Hypokalemia  Patient's most recent potassium results are listed below.   Recent Labs     04/29/25  0758 04/30/25  0619   K 3.3* 3.9     Plan  - Replete potassium per protocol  - Monitor potassium Daily  - Patient's hypokalemia is stable  VTE Risk Mitigation (From admission, onward)           Ordered     rivaroxaban tablet  15 mg  With dinner         04/29/25 1016     IP VTE HIGH RISK PATIENT  Once         04/29/25 1016     Place sequential compression device  Until discontinued         04/29/25 1016                    Discharge Planning   UBALDO: 5/2/2025     Code Status: Full Code   Medical Readiness for Discharge Date:                            Franky Gibbons DO  Department of Hospital Medicine   [facility]

## 2025-04-30 NOTE — ASSESSMENT & PLAN NOTE
Patient has persistent (7 days or more) atrial fibrillation. Patient is currently in atrial fibrillation. [id number] Score: 3. The patients heart rate in the last 24 hours is as follows:  Pulse  Min: 74  Max: 82     Antiarrhythmics       Anticoagulants  rivaroxaban tablet 15 mg, With dinner, Oral    Plan  - Replete lytes with a goal of K>4, Mg >2  - Patient is anticoagulated, see medications listed above.  - Patient's afib is currently controlled

## 2025-04-30 NOTE — ASSESSMENT & PLAN NOTE
"  Patient has a diagnosis of pneumonia. The cause of the pneumonia is suspected to be bacterial in etiology but organism is not known. The pneumonia is stable. The patient has the following signs/symptoms of pneumonia: cough. The patient does not have a current oxygen requirement and the patient does not have a home oxygen requirement. I have reviewed the pertinent imaging. The following cultures have been collected: Blood cultures and Sputum culture The culture results are listed below.     Current antimicrobial regimen consists of the antibiotics listed below. Will monitor patient closely and continue current treatment plan unchanged.    -Given fevers/chills and recurrent pna, reasonable to continue below abx and deescalate when indicated  -f/u sputum, blood cx    Antibiotics (From admission, onward)      Start     Stop Route Frequency Ordered    04/30/25 0900  azithromycin (ZITHROMAX) 500 mg in 0.9% NaCl 250 mL IVPB (admixture device)         05/01/25 1259 IV Every 24 hours (non-standard times) 04/29/25 1016    04/29/25 1145  ceFEPIme injection 1 g         -- IV Every 12 hours (non-standard times) 04/29/25 1037    04/29/25 1114  vancomycin - pharmacy to dose  (vancomycin IVPB (PEDS and ADULTS))        Placed in "And" Linked Group    -- IV pharmacy to manage frequency 04/29/25 1016            Microbiology Results (last 7 days)       Procedure Component Value Units Date/Time    MRSA Screen by PCR [[id number]]     Order Status: Sent Specimen: Nasal Swab     Culture, Respiratory with Gram Stain [[id number]]     Order Status: Sent Specimen: Respiratory     Blood culture x two cultures. Draw prior to antibiotics. [[id number]]  (Normal) Collected: 04/29/25 0758    Order Status: Completed Specimen: Blood from Peripheral, Forearm, Left Updated: 04/30/25 0007     Blood Culture No Growth After 6 Hours    Blood culture x two cultures. Draw prior to antibiotics. [[id number]]  (Normal) Collected: 04/29/25 0758    Order Status: " Completed Specimen: Blood from Peripheral, Antecubital, Left Updated: 04/30/25 0007     Blood Culture No Growth After 6 Hours    Culture, Respiratory with Gram Stain [[id number]]     Order Status: Sent Specimen: Respiratory     Influenza A & B by Molecular [[id number]]  (Normal) Collected: 04/29/25 0749    Order Status: Completed Specimen: Nasal Swab Updated: 04/29/25 0836     INFLUENZA A MOLECULAR Negative     INFLUENZA B MOLECULAR  Negative            Will continue abx - broad spectrum for now  - no cough, so likely unable to get resp cultures  - will get CT scan of the chest to get a better look at consolidation  - hemoptysis - has been ongoing for a few weeks at home. No signs of other areas of bleeding. Already on AC so doubt PE (no other symptoms either). Concern for ongoing PNA possibly chronic MAC or other chronic infection  - based on results of CT can consider pulm consult for bronch or ID consult for abx input    Continue with broad spectrum abx for now. Monitor condition

## 2025-04-30 NOTE — CONSULTS
[facility]  Kidney Transplant  Consult Note    Inpatient consult to Kidney/Pancreas Transplant Medicine  Consult performed by: Risa Blackman MD  Consult ordered by: Pedro Cummins DO            Subjective:     History of Present Illness:   Ravi has ESRD presumed secondary to DM/HTN post  DDRT 11/5/16. his creatinines were in the 1.9-2 ranging late 2024, peaked at 3.0 2/20/25 and  recently have been 2.2-2.5. He presents with fever and noted to have RLL PNA. sepsis protocol initiated and started on broad spectrum abx.  Home IS: tacrolimus 1/1,  mg bid, prednisone 5 mg.    Past Medical and Surgical History: Mr. Zamorano has a past medical history of Anticoagulant long-term use, Anxiety (07/29/2014), Arthritis, atrial fibrillation, Bilateral diabetic retinopathy (2017), Cardioembolic stroke (12/07/2024), CKD (chronic kidney disease) stage 4, GFR 15-29 ml/min (07/29/2014), Colon polyps (2014), Depression - situational (07/29/2014), Diabetes type 2 since 2000 (07/29/2014), Diabetic neuropathy (07/29/2014), Encounter for blood transfusion, History of cardioversion (10/03/2019), History of hepatitis C, s/p successful Rx w/ SVR24 - 9/2017 (07/29/2014), Hyperlipidemia (07/29/2014), Hypertension, Neuropathy, Organ transplant candidate (07/29/2014), Pancreatitis, S/P cadaveric kidney transplant 11/5/2016. ESRD secondary to HTN/DMII (11/05/2016), and Type 2 diabetes mellitus with stage 3a chronic kidney disease, with long-term current use of insulin (07/29/2014).  He has a past surgical history that includes Appendectomy; Kidney transplant; Treatment of cardiac arrhythmia (N/A, 8/26/2019); Transesophageal echocardiography (N/A, 8/26/2019); Colonoscopy (N/A, 12/21/2020); Cataract extraction w/  intraocular lens implant (Right, 3/13/2024); Cataract extraction w/  intraocular lens implant (Left, 4/10/2024); Ablation of arrhythmogenic focus for atrial fibrillation (N/A, 6/11/2024); Cardioversion  "(6/11/2024); ablation, atrial flutter, typical (6/11/2024); Transesophageal echocardiography (N/A, 6/11/2024); Bone marrow biopsy (Left, 6/26/2024); Treatment of cardiac arrhythmia (N/A, 2/3/2025); and echocardiogram,transesophageal (N/A, 2/3/2025).    Past Social and Family History: Mr. Zamorano reports that he quit smoking about 8 years ago. His smoking use included cigarettes. He started smoking about 40 years ago. He has a 16 pack-year smoking history. He has never used smokeless tobacco. He reports current alcohol use. He reports that he does not use drugs.His family history includes Cancer in his brother; Diabetes in his mother; Heart disease in his father and mother; Hypertension in his brother and mother.    Intake/Output - Last 3 Shifts         04/27 0700  04/28 0659 04/28 0700  04/29 0659 04/29 0700  04/30 0659    IV Piggyback   350.2    Total Intake(mL/kg)   350.2 (3.9)    Net   +350.2                    Review of Systems   Constitutional:  Positive for fever.   Respiratory:  Negative for shortness of breath.    Cardiovascular:  Negative for chest pain and leg swelling.   Gastrointestinal:  Negative for abdominal pain.   Genitourinary:  Negative for difficulty urinating.   Skin:  Negative for rash.   Allergic/Immunologic: Positive for immunocompromised state.     Objective:     Vital Signs (Most Recent):  Temp: 98.3 °F (36.8 °C) (04/29/25 1632)  Pulse: 77 (04/29/25 1800)  Resp: 20 (04/29/25 1730)  BP: (!) 195/87 (04/29/25 1800)  SpO2: 98 % (04/29/25 1800) Vital Signs (24h Range):  Temp:  [97.6 °F (36.4 °C)-98.3 °F (36.8 °C)] 98.3 °F (36.8 °C)  Pulse:  [76-89] 77  Resp:  [16-20] 20  SpO2:  [94 %-100 %] 98 %  BP: (128-195)/(64-95) 195/87     Weight: 90.7 kg (200 lb)  Height: 6' 1" (185.4 cm)  Body mass index is 26.39 kg/m².     Physical Exam  Constitutional:       General: He is not in acute distress.     Appearance: Normal appearance. He is not ill-appearing.   Cardiovascular:      Rate and Rhythm: Normal " rate and regular rhythm.   Pulmonary:      Effort: Pulmonary effort is normal.      Breath sounds: Normal breath sounds.   Abdominal:      Palpations: Abdomen is soft. There is no mass.      Tenderness: There is no abdominal tenderness.   Musculoskeletal:      Right lower leg: No edema.      Left lower leg: No edema.   Neurological:      Mental Status: He is alert.          CBC  Recent Labs   Lab 04/09/25  0846 04/29/25  0758 04/29/25  0804   WBC 5.92 8.59  --    HGB 9.9 L 9.0 L  --    POC Hematocrit  --   --  29.1   HCT 35.3 L 31.0 L  --    Platelet Count 155 115 L  --        CHEM & E-LYTES  Recent Labs   Lab 07/11/24  1644 08/29/24  1448 04/09/25  0846 04/29/25  0758   Sodium 135 L   < > 137 135 L   Potassium 3.6   < > 3.7 3.3 L   Chloride 100   < > 101 99   CO2 22 L   < > 25 25   BUN 35 H   < > 37 H 49 H   Creatinine 2.2 H   < > 2.7 H 2.7 H   Glucose 109   < > 183 H 123 H   Calcium 9.5   < > 8.9 8.2 L   Phosphorus Level  --   --  3.5  --    Phosphorus 4.3   < >  --   --    PTH, Intact 163.2 H  --   --   --    Magnesium   --   --   --  2.2   Magnesium  --    < >  --   --    Albumin 3.6   < > 3.1 L 2.8 L   Total Bilirubin  --    < >  --   --    Bilirubin Total  --   --   --  0.5   AST  --    < >  --  14   ALT  --    < >  --  <5 L    < > = values in this interval not displayed.       IMMUNOSUPPRESSANT LEVEL  Recent Labs   Lab 03/19/25  0228 04/09/25  0846 04/29/25  0758   Tacrolimus  --  7.6 5.9   Tacrolimus Lvl 4.8 L  4.8 L  --   --        URINE  Recent Labs   Lab 12/06/24  0812 12/06/24  0926 12/20/24  0839 03/16/25  0357 04/29/25  1228   Protein, UA 3+ A   < >  --  3+ A 2+ A   Leukocyte Esterase, UA  --   --   --   --  Negative   Leukocytes, UA Negative   < >  --  Negative  --    Prot/Creat Ratio, Urine 7.60 H  --  5.57 H  --   --     < > = values in this interval not displayed.        Assessment/Plan:     Renal/  S/P cadaveric kidney transplant 11/5/2016. ESRD secondary to HTN/DMII  -See CKD      CKD IV  (severe)  -Creat slowly declining asper HPI, currently BL creat runs mid 2s, GFR <30  -Follow with strict I/Os, daily weights, BP (goal <140/90), daily labs.    -Avoid nephrotoxic agents (NSAIDs, IV contrast dye, ACEI/ARB anti-HTN medications, Aminoglycoside-containing antibiotics)  -Renally dose all appropriate medications, including antibiotics    Immunology/Multi System  Immunocompromised state due to drug therapy  -See prophylactic immunotherapy.   -Will monitor net IS, as may be contributing to recurrent infections      Prophylactic immunotherapy  -Resume home tacro 1/1 and pred 5 mg Qday  -Tacro target 5-7  -Monitor for toxicities and SE, none noted          Will follow with you      Risa Blackman MD  Kidney Transplant  [facility] - Med Surg

## 2025-04-30 NOTE — ASSESSMENT & PLAN NOTE
"  Patient has a diagnosis of pneumonia. The cause of the pneumonia is suspected to be bacterial in etiology but organism is not known. The pneumonia is stable. The patient has the following signs/symptoms of pneumonia: cough. The patient does not have a current oxygen requirement and the patient does not have a home oxygen requirement. I have reviewed the pertinent imaging. The following cultures have been collected: Blood cultures and Sputum culture The culture results are listed below.     Current antimicrobial regimen consists of the antibiotics listed below. Will monitor patient closely and continue current treatment plan unchanged.    -Given fevers/chills and recurrent pna, reasonable to continue below abx and deescalate when indicated  -f/u sputum, blood cx    Antibiotics (From admission, onward)      Start     Stop Route Frequency Ordered    04/30/25 0900  azithromycin (ZITHROMAX) 500 mg in 0.9% NaCl 250 mL IVPB (admixture device)         05/01/25 1259 IV Every 24 hours (non-standard times) 04/29/25 1016    04/29/25 1145  ceFEPIme injection 1 g         -- IV Every 12 hours (non-standard times) 04/29/25 1037    04/29/25 1114  vancomycin - pharmacy to dose  (vancomycin IVPB (PEDS and ADULTS))        Placed in "And" Linked Group    -- IV pharmacy to manage frequency 04/29/25 1016            Microbiology Results (last 7 days)       Procedure Component Value Units Date/Time    MRSA Screen by PCR [[id number]]     Order Status: Sent Specimen: Nasal Swab     Culture, Respiratory with Gram Stain [[id number]]     Order Status: Sent Specimen: Respiratory     Blood culture x two cultures. Draw prior to antibiotics. [[id number]]  (Normal) Collected: 04/29/25 0758    Order Status: Completed Specimen: Blood from Peripheral, Forearm, Left Updated: 04/30/25 0007     Blood Culture No Growth After 6 Hours    Blood culture x two cultures. Draw prior to antibiotics. [[id number]]  (Normal) Collected: 04/29/25 0758    Order Status: " Completed Specimen: Blood from Peripheral, Antecubital, Left Updated: 04/30/25 0007     Blood Culture No Growth After 6 Hours    Culture, Respiratory with Gram Stain [[id number]]     Order Status: Sent Specimen: Respiratory     Influenza A & B by Molecular [[id number]]  (Normal) Collected: 04/29/25 0749    Order Status: Completed Specimen: Nasal Swab Updated: 04/29/25 0836     INFLUENZA A MOLECULAR Negative     INFLUENZA B MOLECULAR  Negative            Will continue abx - broad spectrum for now  - no cough, so likely unable to get resp cultures  - will get CT scan of the chest to get a better look at consolidation  - hemoptysis - has been ongoing for a few weeks at home. No signs of other areas of bleeding. Already on AC so doubt PE (no other symptoms either). Concern for ongoing PNA possibly chronic MAC or other chronic infection  - based on results of CT can consider pulm consult for bronch or ID consult for abx input    Continue with broad spectrum abx for now. Monitor condition

## 2025-04-30 NOTE — ASSESSMENT & PLAN NOTE
Patient's most recent potassium results are listed below.   Recent Labs     04/29/25  0758 04/30/25  0619   K 3.3* 3.9     Plan  - Replete potassium per protocol  - Monitor potassium Daily  - Patient's hypokalemia is stable

## 2025-04-30 NOTE — ASSESSMENT & PLAN NOTE
Patient's blood pressure range in the last 24 hours was: BP  Min: 169/78  Max: 205/148.The patient's inpatient anti-hypertensive regimen is listed below:  Current Antihypertensives  doxazosin tablet 4 mg, Daily, Oral  hydrALAZINE tablet 100 mg, Every 8 hours, Oral  hydroCHLOROthiazide tablet 12.5 mg, Daily, Oral  valsartan tablet 320 mg, Daily, Oral  hydrALAZINE injection 15 mg, Every 6 hours PRN, Intravenous    Plan  - BP is controlled, no changes needed to their regimen

## 2025-04-30 NOTE — ASSESSMENT & PLAN NOTE
Creatine stable for now. BMP reviewed- noted Estimated Creatinine Clearance: 27.9 mL/min (A) (based on SCr of 2.9 mg/dL (H)). according to latest data. Based on current GFR, CKD stage is stage 3 - GFR 30-59.  Monitor UOP and serial BMP and adjust therapy as needed. Renally dose meds. Avoid nephrotoxic medications and procedures.

## 2025-04-30 NOTE — ASSESSMENT & PLAN NOTE
"Patient has Diastolic (HFpEF) heart failure that is Chronic. On presentation their CHF was well compensated. Most recent BNP and echo results are listed below.  No results for input(s): "BNP" in the last 72 hours.  Latest ECHO  Results for orders placed during the hospital encounter of 02/20/25    Echo    Interpretation Summary    Left Ventricle: There is mild concentric hypertrophy. There is low normal systolic function with a visually estimated ejection fraction of 50 - 55%. Grade III diastolic dysfunction.    Left Ventricle: The left ventricle is at the upper limits of normal in size. Mildly increased wall thickness. There is mild concentric hypertrophy. Regional wall motion abnormalities present. See diagram for wall motion findings. There is low normal systolic function with a visually estimated ejection fraction of 50 - 55%. Ejection fraction is approximately 53%. Grade III diastolic dysfunction.    Right Ventricle: Systolic function is borderline low despite the low TAPSE.    Left Atrium: Left atrium is moderately dilated.    Aortic Valve: The aortic valve is a trileaflet valve. There is mild aortic valve sclerosis. There is moderate annular calcification present. There is mild aortic regurgitation with a centrally directed jet.    Mitral Valve: There is mild regurgitation with a centrally directed jet.    Pericardium: There is a trivial posterior effusion and under the RA.    Tricuspid Valve: There is mild regurgitation.    Pulmonary Artery: The estimated pulmonary artery systolic pressure is 30 mmHg.    Current Heart Failure Medications  hydrALAZINE tablet 100 mg, Every 8 hours, Oral  hydroCHLOROthiazide tablet 12.5 mg, Daily, Oral  valsartan tablet 320 mg, Daily, Oral  empagliflozin (Jardiance) tablet 10 mg, Daily, Oral  hydrALAZINE injection 15 mg, Every 6 hours PRN, Intravenous    Plan  - Monitor strict I&Os and daily weights.    - Place on telemetry  - Low sodium diet  - Cardiology has not been " consulted  - The patient's volume status is at their baseline

## 2025-04-30 NOTE — PLAN OF CARE
Problem: Adult Inpatient Plan of Care  Goal: Plan of Care Review  Outcome: Progressing  Goal: Patient-Specific Goal (Individualized)  Outcome: Progressing  Goal: Absence of Hospital-Acquired Illness or Injury  Outcome: Progressing  Goal: Optimal Comfort and Wellbeing  Outcome: Progressing  Goal: Readiness for Transition of Care  Outcome: Progressing     Problem: Infection  Goal: Absence of Infection Signs and Symptoms  Outcome: Progressing     Problem: Acute Kidney Injury/Impairment  Goal: Fluid and Electrolyte Balance  Outcome: Progressing  Goal: Improved Oral Intake  Outcome: Progressing  Goal: Effective Renal Function  Outcome: Progressing     Problem: Pneumonia  Goal: Fluid Balance  Outcome: Progressing  Goal: Resolution of Infection Signs and Symptoms  Outcome: Progressing  Goal: Effective Oxygenation and Ventilation  Outcome: Progressing     Problem: Diabetes Comorbidity  Goal: Blood Glucose Level Within Targeted Range  Outcome: Progressing

## 2025-04-30 NOTE — PROGRESS NOTES
Pharmacokinetic Assessment Follow Up: IV Vancomycin    Vancomycin serum concentration assessment(s):    The random level was drawn correctly and can be used to guide therapy at this time. The measurement is below the desired definitive target range of 15 to 20 mcg/mL.    Vancomycin Regimen Plan:    Will give vancomycin 750 mg x1 and repeat random level in 24 hours.  Re-dose when the random level is less than 20 mcg/mL, next level to be drawn at 1100 on 5/1    Drug levels (last 3 results):  Recent Labs   Lab Result Units 04/30/25  0913   Vancomycin Random ug/ml 13.2       Pharmacy will continue to follow and monitor vancomycin.    Please contact pharmacy at extension [phone] for questions regarding this assessment.    Thank you for the consult,   Marissa Fam       Patient brief summary:  Ravi Zamorano is a 69 y.o. male initiated on antimicrobial therapy with IV Vancomycin for treatment of lower respiratory infection    The patient's current regimen is pulse dosing based on random levels    Drug Allergies:   Review of patient's allergies indicates:   Allergen Reactions    Nifedipine Other (See Comments)     Gingival hyperplasia       Actual Body Weight:   90.7 kg    Renal Function:   Estimated Creatinine Clearance: 27.9 mL/min (A) (based on SCr of 2.9 mg/dL (H)).,     Dialysis Method (if applicable):  N/A    CBC (last 72 hours):  Recent Labs   Lab Result Units 04/29/25  0758 04/30/25  0619   WBC K/uL 8.59 5.03   HGB gm/dL 9.0* 8.7*   HCT % 31.0* 29.9*   Platelet Count K/uL 115* 104*   Lymph % % 17.1* 22.3   Mono % % 16.1* 16.9*   Eos % % 0.1 0.6   Basophil % % 0.3 0.4       Metabolic Panel (last 72 hours):  Recent Labs   Lab Result Units 04/29/25  0758 04/29/25  1228 04/30/25  0619   Sodium mmol/L 135*  --  135*   Potassium mmol/L 3.3*  --  3.9   Chloride mmol/L 99  --  101   CO2 mmol/L 25  --  24   Glucose mg/dL 123*  --  104   Glucose, UA   --  2+*  --    BUN mg/dL 49*  --  46*   Creatinine mg/dL 2.7*  --  2.9*    Albumin g/dL 2.8*  --   --    Bilirubin Total mg/dL 0.5  --   --    ALP unit/L 52  --   --    AST unit/L 14  --   --    ALT unit/L <5*  --   --    Magnesium  mg/dL 2.2  --  2.3   Phosphorus Level mg/dL  --   --  3.2       Vancomycin Administrations:  vancomycin given in the last 96 hours                     vancomycin 2 g in 0.9% sodium chloride 500 mL IVPB (mg) 2,000 mg New Bag 04/29/25 1036                    Microbiologic Results:  Microbiology Results (last 7 days)       Procedure Component Value Units Date/Time    MRSA Screen by PCR [[id number]]     Order Status: Sent Specimen: Nasal Swab     Culture, Respiratory with Gram Stain [[id number]]     Order Status: Sent Specimen: Respiratory     Blood culture x two cultures. Draw prior to antibiotics. [[id number]]  (Normal) Collected: 04/29/25 0758    Order Status: Completed Specimen: Blood from Peripheral, Forearm, Left Updated: 04/30/25 0007     Blood Culture No Growth After 6 Hours    Blood culture x two cultures. Draw prior to antibiotics. [[id number]]  (Normal) Collected: 04/29/25 0758    Order Status: Completed Specimen: Blood from Peripheral, Antecubital, Left Updated: 04/30/25 0007     Blood Culture No Growth After 6 Hours    Culture, Respiratory with Gram Stain [[id number]]     Order Status: Sent Specimen: Respiratory     Influenza A & B by Molecular [[id number]]  (Normal) Collected: 04/29/25 0749    Order Status: Completed Specimen: Nasal Swab Updated: 04/29/25 0836     INFLUENZA A MOLECULAR Negative     INFLUENZA B MOLECULAR  Negative

## 2025-04-30 NOTE — ASSESSMENT & PLAN NOTE
-Creat slowly declining asper HPI, currently BL creat runs mid 2s, GFR <30  -Follow with strict I/Os, daily weights, BP (goal <140/90), daily labs.    -Avoid nephrotoxic agents (NSAIDs, IV contrast dye, ACEI/ARB anti-HTN medications, Aminoglycoside-containing antibiotics)  -Renally dose all appropriate medications, including antibiotics

## 2025-04-30 NOTE — ASSESSMENT & PLAN NOTE
Patient's FSGs are controlled on current medication regimen.  Last A1c reviewed-   Lab Results   Component Value Date    HGBA1C 8.3 (H) 03/27/2025     Most recent fingerstick glucose reviewed-   Recent Labs   Lab 04/29/25  1659 04/29/25  2145 04/30/25  0805 04/30/25  1235   POCTGLUCOSE 127* 128* 96 120*     Current correctional scale  Medium  Maintain anti-hyperglycemic dose as follows-   Antihyperglycemics (From admission, onward)      Start     Stop Route Frequency Ordered    04/29/25 2100  insulin glargine U-100 (Lantus) pen 15 Units         -- SubQ Nightly 04/29/25 1016    04/29/25 1115  empagliflozin (Jardiance) tablet 10 mg        Question Answer Comment   Does this patient have a diagnosis of heart failure? Yes    Does this patient have type 1 diabetes or diabetic ketoacidosis? No    Does this patient have symptomatic hypotension? No    Is the patient NPO or pending major surgery in next 3 days or less? No        -- Oral Daily 04/29/25 1016    04/29/25 1113  insulin aspart U-100 pen 0-10 Units         -- SubQ Before meals & nightly PRN 04/29/25 1016          Hold Oral hypoglycemics while patient is in the hospital.    Blood glucose acceptable

## 2025-05-01 LAB
ABSOLUTE EOSINOPHIL (OHS): 0.04 K/UL
ABSOLUTE MONOCYTE (OHS): 0.78 K/UL (ref 0.3–1)
ABSOLUTE NEUTROPHIL COUNT (OHS): 2.22 K/UL (ref 1.8–7.7)
ANION GAP (OHS): 8 MMOL/L (ref 8–16)
BASOPHILS # BLD AUTO: 0.03 K/UL
BASOPHILS NFR BLD AUTO: 0.7 %
BUN SERPL-MCNC: 47 MG/DL (ref 8–23)
CALCIUM SERPL-MCNC: 8 MG/DL (ref 8.7–10.5)
CHLORIDE SERPL-SCNC: 104 MMOL/L (ref 95–110)
CO2 SERPL-SCNC: 24 MMOL/L (ref 23–29)
CREAT SERPL-MCNC: 2.5 MG/DL (ref 0.5–1.4)
CREAT SERPL-MCNC: 2.6 MG/DL (ref 0.5–1.4)
ERYTHROCYTE [DISTWIDTH] IN BLOOD BY AUTOMATED COUNT: 19.5 % (ref 11.5–14.5)
GFR SERPLBLD CREATININE-BSD FMLA CKD-EPI: 26 ML/MIN/1.73/M2
GFR SERPLBLD CREATININE-BSD FMLA CKD-EPI: 27 ML/MIN/1.73/M2
GLUCOSE SERPL-MCNC: 107 MG/DL (ref 70–110)
HCT VFR BLD AUTO: 28 % (ref 40–54)
HGB BLD-MCNC: 8.3 GM/DL (ref 14–18)
IMM GRANULOCYTES # BLD AUTO: 0.02 K/UL (ref 0–0.04)
IMM GRANULOCYTES NFR BLD AUTO: 0.5 % (ref 0–0.5)
LYMPHOCYTES # BLD AUTO: 1.1 K/UL (ref 1–4.8)
MAGNESIUM SERPL-MCNC: 2.4 MG/DL (ref 1.6–2.6)
MCH RBC QN AUTO: 22.6 PG (ref 27–31)
MCHC RBC AUTO-ENTMCNC: 29.6 G/DL (ref 32–36)
MCV RBC AUTO: 76 FL (ref 82–98)
NUCLEATED RBC (/100WBC) (OHS): 0 /100 WBC
PHOSPHATE SERPL-MCNC: 3.1 MG/DL (ref 2.7–4.5)
PLATELET # BLD AUTO: 108 K/UL (ref 150–450)
PMV BLD AUTO: ABNORMAL FL
POCT GLUCOSE: 101 MG/DL (ref 70–110)
POCT GLUCOSE: 117 MG/DL (ref 70–110)
POCT GLUCOSE: 93 MG/DL (ref 70–110)
POTASSIUM SERPL-SCNC: 3.5 MMOL/L (ref 3.5–5.1)
RBC # BLD AUTO: 3.68 M/UL (ref 4.6–6.2)
RELATIVE EOSINOPHIL (OHS): 1 %
RELATIVE LYMPHOCYTE (OHS): 26.3 % (ref 18–48)
RELATIVE MONOCYTE (OHS): 18.6 % (ref 4–15)
RELATIVE NEUTROPHIL (OHS): 52.9 % (ref 38–73)
SODIUM SERPL-SCNC: 136 MMOL/L (ref 136–145)
TACROLIMUS BLD-MCNC: 2.1 NG/ML (ref 5–15)
VANCOMYCIN SERPL-MCNC: 15.2 UG/ML (ref ?–80)
WBC # BLD AUTO: 4.19 K/UL (ref 3.9–12.7)

## 2025-05-01 PROCEDURE — 25000003 PHARM REV CODE 250: Mod: HCNC | Performed by: STUDENT IN AN ORGANIZED HEALTH CARE EDUCATION/TRAINING PROGRAM

## 2025-05-01 PROCEDURE — 63600175 PHARM REV CODE 636 W HCPCS: Mod: HCNC | Performed by: HOSPITALIST

## 2025-05-01 PROCEDURE — 80197 ASSAY OF TACROLIMUS: CPT | Mod: HCNC | Performed by: STUDENT IN AN ORGANIZED HEALTH CARE EDUCATION/TRAINING PROGRAM

## 2025-05-01 PROCEDURE — 99223 1ST HOSP IP/OBS HIGH 75: CPT | Mod: HCNC,GC,, | Performed by: INTERNAL MEDICINE

## 2025-05-01 PROCEDURE — 36415 COLL VENOUS BLD VENIPUNCTURE: CPT | Mod: HCNC | Performed by: STUDENT IN AN ORGANIZED HEALTH CARE EDUCATION/TRAINING PROGRAM

## 2025-05-01 PROCEDURE — 21400001 HC TELEMETRY ROOM: Mod: HCNC

## 2025-05-01 PROCEDURE — 63600175 PHARM REV CODE 636 W HCPCS: Mod: HCNC | Performed by: INTERNAL MEDICINE

## 2025-05-01 PROCEDURE — 63600175 PHARM REV CODE 636 W HCPCS: Mod: HCNC

## 2025-05-01 PROCEDURE — 25000003 PHARM REV CODE 250: Mod: HCNC | Performed by: HOSPITALIST

## 2025-05-01 PROCEDURE — 36415 COLL VENOUS BLD VENIPUNCTURE: CPT | Mod: HCNC | Performed by: HOSPITALIST

## 2025-05-01 PROCEDURE — 84100 ASSAY OF PHOSPHORUS: CPT | Mod: HCNC | Performed by: STUDENT IN AN ORGANIZED HEALTH CARE EDUCATION/TRAINING PROGRAM

## 2025-05-01 PROCEDURE — 80202 ASSAY OF VANCOMYCIN: CPT | Mod: HCNC

## 2025-05-01 PROCEDURE — 82565 ASSAY OF CREATININE: CPT | Mod: HCNC | Performed by: HOSPITALIST

## 2025-05-01 PROCEDURE — 85025 COMPLETE CBC W/AUTO DIFF WBC: CPT | Mod: HCNC | Performed by: STUDENT IN AN ORGANIZED HEALTH CARE EDUCATION/TRAINING PROGRAM

## 2025-05-01 PROCEDURE — 25000003 PHARM REV CODE 250: Mod: HCNC

## 2025-05-01 PROCEDURE — 83735 ASSAY OF MAGNESIUM: CPT | Mod: HCNC | Performed by: STUDENT IN AN ORGANIZED HEALTH CARE EDUCATION/TRAINING PROGRAM

## 2025-05-01 PROCEDURE — 80048 BASIC METABOLIC PNL TOTAL CA: CPT | Mod: HCNC | Performed by: STUDENT IN AN ORGANIZED HEALTH CARE EDUCATION/TRAINING PROGRAM

## 2025-05-01 PROCEDURE — 99232 SBSQ HOSP IP/OBS MODERATE 35: CPT | Mod: HCNC,GC,, | Performed by: INTERNAL MEDICINE

## 2025-05-01 PROCEDURE — 63600175 PHARM REV CODE 636 W HCPCS: Mod: HCNC | Performed by: STUDENT IN AN ORGANIZED HEALTH CARE EDUCATION/TRAINING PROGRAM

## 2025-05-01 RX ORDER — TACROLIMUS 1 MG/1
1 CAPSULE ORAL 2 TIMES DAILY
Status: DISCONTINUED | OUTPATIENT
Start: 2025-05-01 | End: 2025-05-01

## 2025-05-01 RX ORDER — GABAPENTIN 300 MG/1
300 CAPSULE ORAL NIGHTLY
Status: DISCONTINUED | OUTPATIENT
Start: 2025-05-01 | End: 2025-05-08 | Stop reason: HOSPADM

## 2025-05-01 RX ORDER — TACROLIMUS 1 MG/1
1 CAPSULE ORAL 2 TIMES DAILY
Status: DISCONTINUED | OUTPATIENT
Start: 2025-05-01 | End: 2025-05-08 | Stop reason: HOSPADM

## 2025-05-01 RX ADMIN — HYDROCHLOROTHIAZIDE 12.5 MG: 12.5 TABLET ORAL at 09:05

## 2025-05-01 RX ADMIN — EMPAGLIFLOZIN 10 MG: 10 TABLET, FILM COATED ORAL at 09:05

## 2025-05-01 RX ADMIN — HYDRALAZINE HYDROCHLORIDE 100 MG: 50 TABLET ORAL at 08:05

## 2025-05-01 RX ADMIN — GABAPENTIN 300 MG: 300 CAPSULE ORAL at 08:05

## 2025-05-01 RX ADMIN — HYDRALAZINE HYDROCHLORIDE 100 MG: 50 TABLET ORAL at 06:05

## 2025-05-01 RX ADMIN — TACROLIMUS 1 MG: 1 CAPSULE ORAL at 06:05

## 2025-05-01 RX ADMIN — TACROLIMUS 1 MG: 1 CAPSULE ORAL at 10:05

## 2025-05-01 RX ADMIN — CEFEPIME 1 G: 1 INJECTION, POWDER, FOR SOLUTION INTRAMUSCULAR; INTRAVENOUS at 08:05

## 2025-05-01 RX ADMIN — RIVAROXABAN 15 MG: 15 TABLET, FILM COATED ORAL at 04:05

## 2025-05-01 RX ADMIN — CEFEPIME 1 G: 1 INJECTION, POWDER, FOR SOLUTION INTRAMUSCULAR; INTRAVENOUS at 09:05

## 2025-05-01 RX ADMIN — VALSARTAN 320 MG: 160 TABLET, FILM COATED ORAL at 09:05

## 2025-05-01 RX ADMIN — GABAPENTIN 300 MG: 300 CAPSULE ORAL at 02:05

## 2025-05-01 RX ADMIN — HYDRALAZINE HYDROCHLORIDE 100 MG: 50 TABLET ORAL at 02:05

## 2025-05-01 RX ADMIN — PREDNISONE 5 MG: 5 TABLET ORAL at 09:05

## 2025-05-01 RX ADMIN — HYDRALAZINE HYDROCHLORIDE 15 MG: 20 INJECTION, SOLUTION INTRAMUSCULAR; INTRAVENOUS at 12:05

## 2025-05-01 RX ADMIN — GABAPENTIN 300 MG: 300 CAPSULE ORAL at 09:05

## 2025-05-01 RX ADMIN — Medication 1 CAPSULE: at 09:05

## 2025-05-01 RX ADMIN — ATORVASTATIN CALCIUM 80 MG: 40 TABLET, FILM COATED ORAL at 09:05

## 2025-05-01 RX ADMIN — DOXAZOSIN 4 MG: 2 TABLET ORAL at 09:05

## 2025-05-01 RX ADMIN — POLYETHYLENE GLYCOL 3350 17 G: 17 POWDER, FOR SOLUTION ORAL at 09:05

## 2025-05-01 RX ADMIN — VANCOMYCIN HYDROCHLORIDE 750 MG: 750 INJECTION, POWDER, LYOPHILIZED, FOR SOLUTION INTRAVENOUS at 02:05

## 2025-05-01 NOTE — ASSESSMENT & PLAN NOTE
Patient's FSGs are controlled on current medication regimen.  Last A1c reviewed-   Lab Results   Component Value Date    HGBA1C 8.3 (H) 03/27/2025     Most recent fingerstick glucose reviewed-   Recent Labs   Lab 04/30/25  2111 05/01/25  0820 05/01/25  1255   POCTGLUCOSE 189* 101 93     Current correctional scale  Medium  Maintain anti-hyperglycemic dose as follows-   Antihyperglycemics (From admission, onward)      Start     Stop Route Frequency Ordered    04/29/25 2100  insulin glargine U-100 (Lantus) pen 15 Units         -- SubQ Nightly 04/29/25 1016    04/29/25 1115  empagliflozin (Jardiance) tablet 10 mg        Question Answer Comment   Does this patient have a diagnosis of heart failure? Yes    Does this patient have type 1 diabetes or diabetic ketoacidosis? No    Does this patient have symptomatic hypotension? No    Is the patient NPO or pending major surgery in next 3 days or less? No        -- Oral Daily 04/29/25 1016    04/29/25 1113  insulin aspart U-100 pen 0-10 Units         -- SubQ Before meals & nightly PRN 04/29/25 1016          Hold Oral hypoglycemics while patient is in the hospital.    Blood glucose acceptable

## 2025-05-01 NOTE — PROGRESS NOTES
Pharmacokinetic Assessment Follow Up: IV Vancomycin    Vancomycin serum concentration assessment(s):    The trough level was drawn correctly and can be used to guide therapy at this time. The measurement is within the desired definitive target range of 15 to 20 mcg/mL.    Vancomycin Regimen Plan:    Give Vancomycin 750 mg IV once.  Re-dose when the random level is less than 20 mcg/mL, next level to be drawn at 1200 on 5/2/25    Drug levels (last 3 results):  Recent Labs   Lab Result Units 04/30/25  0913 05/01/25  1159   Vancomycin Random ug/ml 13.2 15.2       Pharmacy will continue to follow and monitor vancomycin.    Please contact pharmacy for questions regarding this assessment.    Thank you for the consult,   Aditi Kumar       Patient brief summary:  Ravi Zamorano is a 69 y.o. male initiated on antimicrobial therapy with IV Vancomycin for treatment of lower respiratory infection    The patient's current regimen is pulse dose    Drug Allergies:   Review of patient's allergies indicates:   Allergen Reactions    Nifedipine Other (See Comments)     Gingival hyperplasia       Actual Body Weight:   90.7 kg    Renal Function:   Estimated Creatinine Clearance: 32.4 mL/min (A) (based on SCr of 2.5 mg/dL (H)).,     Dialysis Method (if applicable):  N/A    CBC (last 72 hours):  Recent Labs   Lab Result Units 04/29/25  0758 04/30/25 0619 05/01/25  0233   WBC K/uL 8.59 5.03 4.19   HGB gm/dL 9.0* 8.7* 8.3*   HCT % 31.0* 29.9* 28.0*   Platelet Count K/uL 115* 104* 108*   Lymph % % 17.1* 22.3 26.3   Mono % % 16.1* 16.9* 18.6*   Eos % % 0.1 0.6 1.0   Basophil % % 0.3 0.4 0.7       Metabolic Panel (last 72 hours):  Recent Labs   Lab Result Units 04/29/25  0758 04/29/25  1228 04/30/25  0619 05/01/25  0233 05/01/25  1159   Sodium mmol/L 135*  --  135* 136  --    Potassium mmol/L 3.3*  --  3.9 3.5  --    Chloride mmol/L 99  --  101 104  --    CO2 mmol/L 25  --  24 24  --    Glucose mg/dL 123*  --  104 107  --    Glucose, UA    --  2+*  --   --   --    BUN mg/dL 49*  --  46* 47*  --    Creatinine mg/dL 2.7*  --  2.9* 2.6* 2.5*   Albumin g/dL 2.8*  --   --   --   --    Bilirubin Total mg/dL 0.5  --   --   --   --    ALP unit/L 52  --   --   --   --    AST unit/L 14  --   --   --   --    ALT unit/L <5*  --   --   --   --    Magnesium  mg/dL 2.2  --  2.3 2.4  --    Phosphorus Level mg/dL  --   --  3.2 3.1  --        Vancomycin Administrations:  vancomycin given in the last 96 hours                     vancomycin 750 mg in 0.9% NaCl 250 mL IVPB (admixture device) (mg) 750 mg New Bag 04/30/25 1115    vancomycin 2 g in 0.9% sodium chloride 500 mL IVPB (mg) 2,000 mg New Bag 04/29/25 1036                    Microbiologic Results:  Microbiology Results (last 7 days)       Procedure Component Value Units Date/Time    Blood culture x two cultures. Draw prior to antibiotics. [[id number]]  (Normal) Collected: 04/29/25 0758    Order Status: Completed Specimen: Blood from Peripheral, Forearm, Left Updated: 05/01/25 0600     Blood Culture No Growth After 36 Hours    Blood culture x two cultures. Draw prior to antibiotics. [[id number]]  (Normal) Collected: 04/29/25 0758    Order Status: Completed Specimen: Blood from Peripheral, Antecubital, Left Updated: 05/01/25 0600     Blood Culture No Growth After 36 Hours    MRSA Screen by PCR [[id number]]     Order Status: Sent Specimen: Nasal Swab     Culture, Respiratory with Gram Stain [[id number]]     Order Status: Sent Specimen: Respiratory     Culture, Respiratory with Gram Stain [[id number]]     Order Status: Sent Specimen: Respiratory     Influenza A & B by Molecular [[id number]]  (Normal) Collected: 04/29/25 0749    Order Status: Completed Specimen: Nasal Swab Updated: 04/29/25 0836     INFLUENZA A MOLECULAR Negative     INFLUENZA B MOLECULAR  Negative

## 2025-05-01 NOTE — PROGRESS NOTES
Therapy with Vancomycin complete and/or consult discontinued by provider. Pharmacy will sign off, please re-consult as needed.    Thanks!   Aditi Kumar, PharmD  05/01/2025 2:04 PM

## 2025-05-01 NOTE — PLAN OF CARE
[facility] - Med Surg  Initial Discharge Assessment       Primary Care Provider: Mima Mack MD    Admission Diagnosis: Screening for cardiovascular condition [Z13.6]  Thrombocytopenia [D69.6]  Immunocompromised [D84.9]  Chest pain [R07.9]  Pneumonia of left lower lobe due to infectious organism [J18.9]    Admission Date: 4/29/2025  Expected Discharge Date: 5/2/2025    Transition of Care Barriers: (P) None    Payor: HUMANA MANAGED MEDICARE / Plan: HUMANA MEDICARE HMO / Product Type: Capitation /     Extended Emergency Contact Information  Primary Emergency Contact: Erika Zamorano  Address: [address] [place] [address] [facility]  Home Phone: [phone]  Mobile Phone: [phone]  Relation: Spouse    Discharge Plan A: (P) Home with family  Discharge Plan B: (P) Home      [facility]  [address] [facility] [address]  Phone: [phone] Fax: [phone]    [employer] [facility][id number] [address] AT [address] [address]  Phone: [phone] Fax: [phone]    Sw met with pt at bedside to discuss dc planning assessment. Pt reported that he is independent and ambulatory wit ADL's. Pt does not use any DME. Pt resides in a single story home with Erika Zamorano [phone] (Spouse). There are no needs from case management at this time.       Discharge Plan A and Plan B have been determined by review of patient's clinical status, future medical and therapeutic needs, and coverage/benefits for post-acute care in coordination with multidisciplinary team members.    Initial Assessment (most recent)       Adult Discharge Assessment - 05/01/25 1556          Discharge Assessment    Assessment Type Discharge Planning Assessment     Confirmed/corrected address, phone number and insurance Yes     Confirmed Demographics Correct on Facesheet     Source of Information  patient     When was your last doctors appointment? --   2025    Communicated UBALDO with patient/caregiver Date not available/Unable to determine     Reason For Admission Community acquired bacterial pneumonia     People in Home spouse     Do you expect to return to your current living situation? Yes     Who are your caregiver(s) and their phone number(s)? Erika Lopezille [phone] (Spouse) (P)      Prior to hospitilization cognitive status: Alert/Oriented (P)      Current cognitive status: Alert/Oriented (P)      Walking or Climbing Stairs Difficulty no (P)      Dressing/Bathing Difficulty no (P)      Home Accessibility wheelchair accessible (P)      Home Layout Able to live on 1st floor (P)      Equipment Currently Used at Home none (P)      Readmission within 30 days? No (P)      Patient currently being followed by outpatient case management? No (P)      Do you currently have service(s) that help you manage your care at home? No (P)      Do you take prescription medications? Yes (P)      Do you have prescription coverage? Yes (P)      Coverage HU[employer]NA MANAGED MEDICARE - HUMANA MEDICARE HMO - CAPITATED (P)      Do you have any problems affording any of your prescribed medications? No (P)      Is the patient taking medications as prescribed? yes (P)      Who is going to help you get home at discharge? Erika Zamorano [phone] (Spouse) (P)      How do you get to doctors appointments? car, drives self (P)      Are you on dialysis? No (P)      Do you take coumadin? No (P)      Discharge Plan A Home with family (P)      Discharge Plan B Home (P)      DME Needed Upon Discharge  none (P)      Discharge Plan discussed with: Patient;Spouse/sig other (P)      Name(s) and Number(s) Erika Zamorano [phone] (Spouse) (P)      Transition of Care Barriers None (P)         Physical Activity    On average, how many days per week do you engage in moderate to strenuous exercise (like a brisk walk)? 7 days (P)      On  average, how many minutes do you engage in exercise at this level? 10 min (P)         Financial Resource Strain    How hard is it for you to pay for the very basics like food, housing, medical care, and heating? Not hard at all (P)         Housing Stability    In the last 12 months, was there a time when you were not able to pay the mortgage or rent on time? No (P)      At any time in the past 12 months, were you homeless or living in a shelter (including now)? No (P)         Transportation Needs    In the past 12 months, has lack of transportation kept you from medical appointments or from getting medications? No (P)      In the past 12 months, has lack of transportation kept you from meetings, work, or from getting things needed for daily living? No (P)         Food Insecurity    Within the past 12 months, you worried that your food would run out before you got the money to buy more. Never true (P)      Within the past 12 months, the food you bought just didn't last and you didn't have money to get more. Never true (P)         Stress    Do you feel stress - tense, restless, nervous, or anxious, or unable to sleep at night because your mind is troubled all the time - these days? Not at all (P)         Social Isolation    How often do you feel lonely or isolated from those around you?  Never (P)         Alcohol Use    Q1: How often do you have a drink containing alcohol? 2-3 times a week (P)      Q2: How many drinks containing alcohol do you have on a typical day when you are drinking? 1 or 2 (P)      Q3: How often do you have six or more drinks on one occasion? Never (P)         CrowdRise    In the past 12 months has the electric, gas, oil, or water company threatened to shut off services in your home? No (P)         Health Literacy    How often do you need to have someone help you when you read instructions, pamphlets, or other written material from your doctor or pharmacy? Never (P)         OTHER    Name(s) of  People in Home Erika Zamorano [phone] (Spouse) (P)                    Lala Trent   Case Management  [phone]

## 2025-05-01 NOTE — ASSESSMENT & PLAN NOTE
Anemia is likely due to chronic disease due to Chronic Kidney Disease. Most recent hemoglobin and hematocrit are listed below.  Recent Labs     04/29/25  0758 04/29/25  0804 04/30/25  0619 05/01/25  0233   HGB 9.0*  --  8.7* 8.3*   HCT 31.0* 29.1 29.9* 28.0*     Plan  - Monitor serial CBC: Daily  - Transfuse PRBC if patient becomes hemodynamically unstable, symptomatic or H/H drops below 7/21.  - Patient has not received any PRBC transfusions to date  - Patient's anemia is currently stable

## 2025-05-01 NOTE — CONSULTS
[facility] Surg  Pulmonology  Consult Note    Patient Name: Ravi Zamorano  MRN: [id number]  Admission Date: 4/29/2025  Hospital Length of Stay: 2 days  Code Status: Full Code  Attending Physician: Cliff Guaman MD  Primary Care Provider: Mima Mack MD   Principal Problem: Community acquired bacterial pneumonia    Inpatient consult to Pulmonology  Consult performed by: Ezequiel Ahn MD  Consult ordered by: Cliff Guaman MD        Subjective:     HPI:  Ravi Zamorano is a 69 y.o. 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 On the night of 4/28 - 4/29, patient's wife measured fever of 103F with reported chills prompting visit to ED. Patient denied preceding cough, chest pain, sinus pain/pressure, headaches. He noted shortness of breath but says this is chronic and attributes to his diagnosis of heart failure. In regards to his reported hemoptysis, he states during his last admission for pneumonia in March 2025, he noted some scant blood-tinged sputum when he clears this throat in the morning. He states this has since resolved. Denies chary blood or epistaxis. During his recent hospitalization, he completed antibiotics course, including oral vancomycin for Cdiff PCR/Ag-positive, toxin-negative diarrhea. He was seen by primary care 4/22/25 and reported no ongoing symptoms of cough, fever, or diarrhea at that time.     He is currently admitted for community acquired bacterial pneumonia and was started on IV empiric antibiotics. He is hypertensive but afebrile and otherwise hemodynamically stable on room air. CT chest was ordered and currently in process. Anemia and thrombocytopenia appear stable. He is a  former cigarette smoker, 16 pack-year history. Quit about 8 years ago. He worked as a  but is now retired. He has gastric reflux and notes nocturnal symptoms. Denies dysphagia. Pulmonology was consulted for hemoptysis.     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    CKD (chronic kidney disease) stage 4, GFR 15-29 ml/min 07/29/2014    Colon polyps 2014    Depression - situational 07/29/2014    Diabetes type 2 since 2000 07/29/2014    Diabetic neuropathy 07/29/2014    Encounter for blood transfusion     History of cardioversion 10/03/2019    History of hepatitis C, s/p successful Rx w/ SVR24 - 9/2017 07/29/2014    Genotype 1a, treatment naive 10/2014 liver biopsy - grade 1 / stage 1 Completed Harvoni w/ SVR    Hyperlipidemia 07/29/2014    Hypertension     Neuropathy     Organ transplant candidate 07/29/2014    Pancreatitis     S/P cadaveric kidney transplant 11/5/2016. ESRD secondary to HTN/DMII 11/05/2016    Type 2 diabetes mellitus with stage 3a chronic kidney disease, with long-term current use of insulin 07/29/2014       Past Surgical History:   Procedure Laterality Date    ABLATION OF ARRHYTHMOGENIC FOCUS FOR ATRIAL FIBRILLATION N/A 6/11/2024    Procedure: Ablation atrial fibrillation;  Surgeon: Bronson Bowden MD;  Location: [facility] EP LAB;  Service: Cardiology;  Laterality: N/A;  AF, FELICIANO (cx if SR), PVI, RFA, Carto, Gen, GP, 3 Prep    ABLATION, ATRIAL FLUTTER, TYPICAL  6/11/2024    Procedure: Ablation, Atrial Flutter, Typical;  Surgeon: Bronson Bowden MD;  Location: [facility] EP LAB;  Service: Cardiology;;    APPENDECTOMY      BONE MARROW BIOPSY Left 6/26/2024    Procedure: Biopsy-bone marrow;  Surgeon: Bridger Zapata MD;  Location: [facility] ENDO ([address] [place] [address]);  Service: Oncology;  Laterality: Left;  6/24-pt knows to hold aspirin and eliquis as  instructed by hem/onc, precall complete-Kpvt    CARDIOVERSION  6/11/2024    Procedure: Cardioversion;  Surgeon: Bronson Bowden MD;  Location: [facility] EP LAB;  Service: Cardiology;;    CATARACT EXTRACTION W/  INTRAOCULAR LENS IMPLANT Right 3/13/2024    Procedure: EXTRACTION, CATARACT, WITH IOL INSERTION;  Surgeon: Jennifer Palacio MD;  Location: [facility] OR;  Service: Ophthalmology;  Laterality: Right;    CATARACT EXTRACTION W/  INTRAOCULAR LENS IMPLANT Left 4/10/2024    Procedure: EXTRACTION, CATARACT, WITH IOL INSERTION;  Surgeon: Jennifer Palacio MD;  Location: [facility] OR;  Service: Ophthalmology;  Laterality: Left;    COLONOSCOPY N/A 12/21/2020    Procedure: COLONOSCOPY;  Surgeon: Tico Bell MD;  Location: [facility] ENDO (4TH FLR);  Service: Endoscopy;  Laterality: N/A;  ok to hold eliquis per Dr. Valadez, see telephone encounter 11/13/2020-MS  covid test 12/18 [place]    ECHOCARDIOGRAM,TRANSESOPHAGEAL N/A 2/3/2025    Procedure: Transesophageal echo (FELICIANO) intra-procedure log documentation;  Surgeon: Grayson Lin III, MD;  Location: [facility] EP LAB;  Service: Cardiology;  Laterality: N/A;    KIDNEY TRANSPLANT      TRANSESOPHAGEAL ECHOCARDIOGRAPHY N/A 8/26/2019    Procedure: ECHOCARDIOGRAM, TRANSESOPHAGEAL;  Surgeon: [facility] Diagnostic Provider;  Location: [facility] EP LAB;  Service: Cardiology;  Laterality: N/A;    TRANSESOPHAGEAL ECHOCARDIOGRAPHY N/A 6/11/2024    Procedure: ECHOCARDIOGRAM, TRANSESOPHAGEAL;  Surgeon: Grayson Lin III, MD;  Location: [facility] EP LAB;  Service: Cardiology;  Laterality: N/A;    TREATMENT OF CARDIAC ARRHYTHMIA N/A 8/26/2019    Procedure: CARDIOVERSION;  Surgeon: Bronson Bowden MD;  Location: [facility] EP LAB;  Service: Cardiology;  Laterality: N/A;  AF, FELICIANO, DCCV, MAC, GP, 3 PREP    TREATMENT OF CARDIAC ARRHYTHMIA N/A 2/3/2025    Procedure: Cardioversion or Defibrillation;  Surgeon: Bronson Bowden MD;  Location: [facility] EP LAB;  Service: Cardiology;  Laterality: N/A;  AF, FELICIANO, DCCV, MAC, GP, 3 Prep        Review of patient's allergies indicates:   Allergen Reactions    Nifedipine Other (See Comments)     Gingival hyperplasia       Family History       Problem Relation (Age of Onset)    Cancer Brother    Diabetes Mother    Heart disease Mother, Father    Hypertension Mother, Brother          Tobacco Use    Smoking status: Former     Current packs/day: 0.00     Average packs/day: 0.5 packs/day for 32.0 years (16.0 ttl pk-yrs)     Types: Cigarettes     Start date: 1984     Quit date: 2016     Years since quittin.4    Smokeless tobacco: Never   Substance and Sexual Activity    Alcohol use: Yes     Comment: Pt reports occasional beers on Sundays. Pt reports drinking daily prior to ESRD diagnosis.    Drug use: No    Sexual activity: Yes     Partners: Female         Review of Systems   Constitutional:  Positive for chills and fever.   HENT:  Negative for drooling, nosebleeds and trouble swallowing.    Respiratory:  Positive for cough and shortness of breath. Negative for chest tightness.    Cardiovascular:  Negative for chest pain, palpitations and leg swelling.   Gastrointestinal:  Negative for diarrhea, nausea and vomiting.   Musculoskeletal:  Positive for myalgias (L Leg pain).     Objective:     Vital Signs (Most Recent):  Temp: 98 °F (36.7 °C) (25 08)  Pulse: 78 (25 1053)  Resp: 18 (25 08)  BP: (!) 170/76 (25 1015)  SpO2: 95 % (25 08) Vital Signs (24h Range):  Temp:  [98 °F (36.7 °C)-98.4 °F (36.9 °C)] 98 °F (36.7 °C)  Pulse:  [76-87] 78  Resp:  [18] 18  SpO2:  [95 %-99 %] 95 %  BP: (157-196)/(50-92) 170/76     Weight: 90.7 kg (200 lb)  Body mass index is 26.39 kg/m².    No intake or output data in the 24 hours ending 25 1240     Physical Exam  Vitals and nursing note reviewed.   Constitutional:       General: He is not in acute distress.     Appearance: Normal appearance. He is not ill-appearing.   HENT:      Head: Normocephalic and atraumatic.      Nose:  Nose normal.   Eyes:      Extraocular Movements: Extraocular movements intact.   Cardiovascular:      Rate and Rhythm: Normal rate and regular rhythm.   Pulmonary:      Effort: Pulmonary effort is normal. No respiratory distress.      Breath sounds: Normal breath sounds. No wheezing, rhonchi or rales.   Abdominal:      Palpations: Abdomen is soft.   Musculoskeletal:      Right lower leg: Edema (mild) present.      Left lower leg: Edema (mild) present.   Skin:     General: Skin is warm and dry.   Neurological:      Mental Status: He is alert and oriented to person, place, and time.          Vents:       Lines/Drains/Airways       Peripheral Intravenous Line  Duration                  Peripheral IV - Single Lumen 04/30/25 1112 20 G Anterior;Distal;Right Upper Arm 1 day                    Significant Labs:    CBC/Anemia Profile:  Recent Labs   Lab 04/30/25 0619 05/01/25  0233   WBC 5.03 4.19   HGB 8.7* 8.3*   HCT 29.9* 28.0*   * 108*   MCV 77* 76*   RDW 19.7* 19.5*        Chemistries:  Recent Labs   Lab 04/30/25 0619 05/01/25  0233   * 136   K 3.9 3.5    104   CO2 24 24   BUN 46* 47*   CREATININE 2.9* 2.6*   CALCIUM 8.0* 8.0*   MG 2.3 2.4   PHOS 3.2 3.1       All pertinent labs within the past 24 hours have been reviewed.    Significant Imaging:   I have reviewed all pertinent imaging results/findings within the past 24 hours.    Assessment/Plan:     Pulmonary  * Community acquired bacterial pneumonia  69 y.o. 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 On the night of 4/28 - 4/29, patient's wife measured fever of 103F with reported chills prompting visit to ED. Patient denied preceding cough, chest pain, sinus  pain/pressure, headaches. He noted shortness of breath but says this is chronic and attributes to his heart failure.     Recommendations:  -- second episode of pneumonia since March 2025  -- some history and symptoms concerning for possible aspiration pneumonia, R lung affected during both episodes  -- denies dysphagia, however did have embolic R MCA CVA in 12/2024  -- CT chest 5/1/25 images review by pulm team (awaiting formal radiology read)   -- RML GGO concerning for pneumonia   -- possible hiatal hernia and possible anterior vertebral osteophyte near mid esophagus  -- given the above, recommend SLP eval and esophogram to assess for hiatal hernia and recurrent aspiration as a cause of repeat pneumonia  -- pending infectious studies, recommend course of augmentin if otherwise consistent with aspiration pneumonia    Hemoptysis  In regards to his reported hemoptysis, he states during his last admission for pneumonia in March 2025, he noted some scant blood-tinged sputum when he clears this throat in the morning. He states this has since resolved. Denies chary blood or epistaxis.      He is currently admitted for community acquired bacterial pneumonia and was started on IV empiric antibiotics. He is hypertensive but afebrile and otherwise hemodynamically stable on room air. CT chest was ordered and currently in process. Anemia and thrombocytopenia appear stable. He is a former cigarette smoker, 16 pack-year history. Quit about 8 years ago. He worked as a  but is now retired. He has gastric reflux and notes nocturnal symptoms. Denies dysphagia. Pulmonology was consulted for hemoptysis    Recommendations:  -- denies active or ongoing hemoptysis at this time, Hgb and platelets low but stable. Benign exam  -- symptom onset correlated with pneumonia, likely 2/2 inflammation  -- f/u formal CT chest read, on CT chest images review with pulmonology team no obvious bronchiectasis or lesions concerning for  malignancy  -- outpatient referral to pulmonology as needed if recurrent symptoms         Please await attending attestation for final recommendations.    Thank you for your consult. I will sign off. Please contact us if you have any additional questions.     Ezequiel Ahn MD  Pulmonology  [facility] Surg

## 2025-05-01 NOTE — ASSESSMENT & PLAN NOTE
Creatine stable for now. BMP reviewed- noted Estimated Creatinine Clearance: 32.4 mL/min (A) (based on SCr of 2.5 mg/dL (H)). according to latest data. Based on current GFR, CKD stage is stage 3 - GFR 30-59.  Monitor UOP and serial BMP and adjust therapy as needed. Renally dose meds. Avoid nephrotoxic medications and procedures.

## 2025-05-01 NOTE — PROGRESS NOTES
[facility] Medicine  Progress Note    Patient Name: Ravi Zamorano  MRN: [id number]  Patient Class: IP- Inpatient   Admission Date: 4/29/2025  Length of Stay: 2 days  Attending Physician: Cliff Guaman MD  Primary Care Provider: Mima Mack MD        Subjective     Principal Problem:Community acquired bacterial pneumonia        HPI:  This is a 69-year-old male with a history of DM, renal transplant 2016, CHFpEF, AF/AFL (PVI/PWI/CTI 6/2024), CVA, recurrent PNA who presents with fevers, chills.  States that symptoms are similar to last month when he was admitted for pneumonia.  Was in his usual state of health until last night when he started experiencing intermittent fevers, chills.  Wife states she also noticed that he has had increased intermittent cough over the past day or so.  Patient did not notice this.  Denies any production.  Also denies any shortness for breath, wheezing, chest pain, abdominal pain, diarrhea, nausea, vomiting, dysuria.  Compliant with all medications.  Trace lower extremity swelling is chronic.    ED course:  On room air.  Lab work significant for Cr 2.7, same as previous. CXR showed development of RLL consolidation.  Received IV vancomycin, azithromycin, Zosyn.  Septic workup obtained.    Overview/Hospital Course:  Patient admitted due to fever, fatigue and chills at home. He has not had any cough or sob at home. He was recently treated for PNA at the end of march and was feeling a lot better since then, until he had a sudden onset of symptoms. He did describe some productive cough for several morning and the mucus he coughed op had rust colored blood as well as bright red blood sometimes. Theses episodes only happened shortly after wakening in the morning and didn't persist during the day, no associated sob or other symptoms during those times. Denies nasal drainage, congestion, or nose bleeds.       Started on broad spectrum and feeling a lot better.  Still on RA and no cough, wheezes or sob     CT chest ordered to get a better look at the consolidations and possible hemoptysis, will considered pulmonology and/or ID consulted to help direct care    5/1  PMH of DM, renal transplant 2016, CHFpEF, AF/AFL (PVI/PWI/CTI 6/2024), CVA, recurrent PNA who presents with fevers, chills.  States that symptoms are similar to last month when he was admitted for pneumonia. No other pna symptoms despite CXR changes.BC x2 4/29 NGTD. vancomycins discontinued.  respiratory cultures pending.  CT chest ordered -[facility] concerning for pneumonia possible hiatal hernia and possible anterior vertebral osteophyte near mid esophagus.  SLP consulted. MBSS and barium esophogram to assess for hiatal hernia (second episode of pneumonia since March 2025) and recurrent aspiration as a cause of repeat pneumonia. continue cefepime. completed azithromycin. denies hemoptysis this admission. last episode 2 weeks back. ID consulted for recurrent pneumonia/ immunosuppressed/ hemoptysis.  resumed prograf 1mg BID. continue prednisone. hold cellcept for now       Review of Systems:   Pain scale:   Constitutional:  fever,  chills, headache, vision loss, hearing loss, weight loss, Generalized weakness, falls, loss of smell, loss of taste, poor appetite,  sore throat  Respiratory: cough, shortness of breath.   Cardiovascular: chest pain, dizziness, palpitations, orthopnea, swelling of feet, syncope  Gastrointestinal: nausea, vomiting, abdominal pain, diarrhea, black stool,  blood in stool, change in bowel habits, constipation  Genitourinary: hematuria, dysuria, urgency, frequency  Integument/Breast: rash,  pruritis  Hematologic/Lymphatic: easy bruising, lymphadenopathy  Musculoskeletal: arthralgias , myalgias, back pain, neck pain, knee pain  Neurological: confusion, seizures, tremors, slurred speech  Behavioral/Psych:  depression, anxiety, auditory or visual hallucinations     OBJECTIVE:     Physical  "Exam:  Body mass index is 26.39 kg/m².    Constitutional: Appears well-developed and well-nourished.   Head: Normocephalic and atraumatic.   Neck: Normal range of motion. Neck supple.   Cardiovascular: Normal heart rate.  Regular heart rhythm.  Pulmonary/Chest: Effort normal.   Abdominal: No distension.  No tenderness  Musculoskeletal: Normal range of motion. No edema.   Neurological: Alert and oriented to person, place, and time.   Skin: Skin is warm and dry.   Psychiatric: Normal mood and affect. Behavior is normal.                  Vital Signs  Temp: 97.7 °F (36.5 °C) (05/01/25 1629)  Pulse: 86 (05/01/25 1629)  Resp: 18 (05/01/25 1629)  BP: (!) 174/74 (05/01/25 1629)  SpO2: 98 % (05/01/25 1629)     24 Hour VS Range    Temp:  [97.7 °F (36.5 °C)-98.4 °F (36.9 °C)]   Pulse:  []   Resp:  [18]   BP: (170-196)/(50-92)   SpO2:  [95 %-99 %]   No intake or output data in the 24 hours ending 05/01/25 1833      I/O This Shift:  No intake/output data recorded.    Wt Readings from Last 3 Encounters:   04/29/25 90.7 kg (200 lb)   04/22/25 92.8 kg (204 lb 9.4 oz)   04/16/25 91.9 kg (202 lb 9.6 oz)       I have personally reviewed the vitals and recorded Intake/Output     Laboratory/Diagnostic Data:    CBC/Anemia Labs: Coags:    Recent Labs   Lab 04/29/25  0758 04/29/25  0804 04/30/25  0619 05/01/25  0233   WBC 8.59  --  5.03 4.19   HGB 9.0*  --  8.7* 8.3*   HCT 31.0* 29.1 29.9* 28.0*   *  --  104* 108*   MCV 77*  --  77* 76*   RDW 19.4*  --  19.7* 19.5*    No results for input(s): "PT", "INR", "APTT" in the last 168 hours.     Chemistries: ABG:   Recent Labs   Lab 04/29/25  0758 04/30/25  0619 05/01/25  0233 05/01/25  1159   * 135* 136  --    K 3.3* 3.9 3.5  --    CL 99 101 104  --    CO2 25 24 24  --    BUN 49* 46* 47*  --    CREATININE 2.7* 2.9* 2.6* 2.5*   CALCIUM 8.2* 8.0* 8.0*  --    PROT 5.9*  --   --   --    BILITOT 0.5  --   --   --    ALKPHOS 52  --   --   --    ALT <5*  --   --   --    AST 14  --   " --   --    MG 2.2 2.3 2.4  --    PHOS  --  3.2 3.1  --     Recent Labs   Lab 04/29/25  0804   PH 7.439   PCO2 40.5   PO2 47.3   HCO3 26.8        POCT Glucose: HbA1c:    Recent Labs   Lab 04/30/25  0805 04/30/25  1235 04/30/25  1711 04/30/25  2111 05/01/25  0820 05/01/25  1255   POCTGLUCOSE 96 120* 156* 189* 101 93    Hemoglobin A1C   Date Value Ref Range Status   01/27/2025 8.6 (H) 4.0 - 5.6 % Final     Comment:     ADA Screening Guidelines:  5.7-6.4%  Consistent with prediabetes  >or=6.5%  Consistent with diabetes    High levels of fetal hemoglobin interfere with the HbA1C  assay. Heterozygous hemoglobin variants (HbS, HgC, etc)do  not significantly interfere with this assay.   However, presence of multiple variants may affect accuracy.     12/07/2024 8.1 (H) 4.0 - 5.6 % Final     Comment:     ADA Screening Guidelines:  5.7-6.4%  Consistent with prediabetes  >or=6.5%  Consistent with diabetes    High levels of fetal hemoglobin interfere with the HbA1C  assay. Heterozygous hemoglobin variants (HbS, HgC, etc)do  not significantly interfere with this assay.   However, presence of multiple variants may affect accuracy.     11/19/2024 7.4 (H) 4.0 - 5.6 % Final     Comment:     ADA Screening Guidelines:  5.7-6.4%  Consistent with prediabetes  >or=6.5%  Consistent with diabetes    High levels of fetal hemoglobin interfere with the HbA1C  assay. Heterozygous hemoglobin variants (HbS, HgC, etc)do  not significantly interfere with this assay.   However, presence of multiple variants may affect accuracy.       Hemoglobin A1c   Date Value Ref Range Status   03/27/2025 8.3 (H) 4.0 - 5.6 % Final     Comment:     ADA Screening Guidelines:  5.7-6.4%  Consistent with prediabetes  >=6.5%  Consistent with diabetes    High levels of fetal hemoglobin interfere with the HbA1C  assay. Heterozygous hemoglobin variants (HbS, HgC, etc)do  not significantly interfere with this assay.   However, presence of multiple variants may affect  "accuracy.        Cardiac Enzymes: Ejection Fractions:    No results for input(s): "CPK", "CPKMB", "MB", "TROPONINI" in the last 72 hours. EF   Date Value Ref Range Status   02/21/2025 53 % Final   06/16/2024 58 % Final          No results for input(s): "COLORU", "APPEARANCEUA", "PHUR", "SPECGRAV", "PROTEINUA", "GLUCUA", "KETONESU", "BILIRUBINUA", "OCCULTUA", "NITRITE", "UROBILINOGEN", "LEUKOCYTESUR", "RBCUA", "WBCUA", "BACTERIA", "SQUAMEPITHEL", "HYALINECASTS" in the last 48 hours.    Invalid input(s): "WRIGHTSUR"    Lactate (Lactic Acid) (mmol/L)   Date Value   07/11/2022 1.0   07/10/2022 3.0 (H)   07/10/2022 3.1 (H)   03/15/2019 1.5   03/15/2019 3.3 (H)     BNP (pg/mL)   Date Value   03/16/2025 1,766 (H)   02/20/2025 1,055 (H)   06/25/2024 992 (H)   06/15/2024 3,203 (H)   06/04/2024 3,313 (H)     CRP (mg/L)   Date Value   04/02/2024 6.9   07/10/2022 40.5 (H)     Sed Rate   Date Value   04/02/2024 62 mm/Hr (H)   03/09/2007 2 mm/hr   05/10/2006 6 mm/hr     D-Dimer (mg/L FEU)   Date Value   07/10/2022 0.45     Ferritin (ng/mL)   Date Value   04/01/2024 816 (H)   07/10/2022 148   02/07/2022 73   05/26/2021 29   01/30/2017 251   11/08/2016 389 (H)     LD (U/L)   Date Value   04/01/2024 206   07/10/2022 221   11/10/2021 220     Troponin I (ng/mL)   Date Value   06/15/2024 0.812 (H)   06/15/2024 0.948 (H)   06/15/2024 0.934 (H)   05/02/2024 0.036 (H)   05/02/2024 0.049 (H)   07/11/2022 0.090 (H)   07/10/2022 0.265 (H)   03/16/2019 0.037 (H)     CPK (U/L)   Date Value   07/10/2022 83   03/16/2019 56   05/09/2008 86   05/15/2006 87   05/10/2006 44   05/10/2006 29   05/09/2006 40     Results for orders placed or performed in visit on 07/11/24   Vitamin D    Collection Time: 07/11/24  4:44 PM   Result Value Ref Range    Vit D, 25-Hydroxy 17 (L) 30 - 96 ng/mL     *Note: Due to a large number of results and/or encounters for the requested time period, some results have not been displayed. A complete set of results can be found " in Results Review.     SARS-CoV2 (COVID-19) Qualitative PCR (no units)   Date Value   03/16/2025 Not Detected   12/18/2020 Not Detected     SARS-CoV-2 RNA, Amplification, Qual (no units)   Date Value   03/16/2025 Negative   12/07/2024 Negative     POC Rapid COVID (no units)   Date Value   07/08/2022 Positive (A)   08/02/2021 Positive (A)       Microbiology labs for the last week  Microbiology Results (last 7 days)       Procedure Component Value Units Date/Time    Blood culture x two cultures. Draw prior to antibiotics. [[id number]]  (Normal) Collected: 04/29/25 0758    Order Status: Completed Specimen: Blood from Peripheral, Forearm, Left Updated: 05/01/25 1801     Blood Culture No Growth After 48 Hours    Blood culture x two cultures. Draw prior to antibiotics. [[id number]]  (Normal) Collected: 04/29/25 0758    Order Status: Completed Specimen: Blood from Peripheral, Antecubital, Left Updated: 05/01/25 1801     Blood Culture No Growth After 48 Hours    MRSA Screen by PCR [[id number]]     Order Status: Sent Specimen: Nasal Swab     Culture, Respiratory with Gram Stain [[id number]]     Order Status: Sent Specimen: Respiratory     Culture, Respiratory with Gram Stain [[id number]]     Order Status: Sent Specimen: Respiratory     Influenza A & B by Molecular [[id number]]  (Normal) Collected: 04/29/25 0749    Order Status: Completed Specimen: Nasal Swab Updated: 04/29/25 0836     INFLUENZA A MOLECULAR Negative     INFLUENZA B MOLECULAR  Negative            Reviewed and noted in plan where applicable- Please see chart for full lab data.    Lines/Drains:       Peripheral IV - Single Lumen 04/30/25 1112 20 G Anterior;Distal;Right Upper Arm (Active)   Site Assessment Clean;Dry;Intact;No redness;No swelling;No drainage 05/01/25 0800   Line Status Saline locked;Flushed 05/01/25 0800   Dressing Status Intact;Dry;Clean 05/01/25 0800   Dressing Intervention Integrity maintained 05/01/25 0800   Number of days: 1        Imaging  ECG Results              EKG 12-lead (Final result)        Collection Time Result Time QRS Duration OHS QTC Calculation    04/29/25 07:35:29 04/29/25 07:45:23 112 460                     Final result by Interface, Lab In [facility] (04/29/25 07:45:29)                   Narrative:    Test Reason : Z13.6,    Vent. Rate :  78 BPM     Atrial Rate : 300 BPM     P-R Int :    ms          QRS Dur : 112 ms      QT Int : 404 ms       P-R-T Axes : -89 105   8 degrees    QTcB Int : 460 ms    Atrial flutter  Rightward axis  Nonspecific ST and T wave abnormality  Low septal forces ; Abnormal R wave progression in the precordial leads  -Possible anterior infarct  Possible lateral infarct vs lead reversal  Prolonged QT  Abnormal ECG  When compared with ECG of 16-Apr-2025 13:38,  The axis Shifted right  ST no longer elevated in Anterior leads  Confirmed by Tejas Carter ([id number]) on 4/29/2025 7:45:21 AM    Referred By: AAAREFERRAL SELF           Confirmed By: Tejas Carter                                    Results for orders placed during the hospital encounter of 02/20/25    Echo    Interpretation Summary    Left Ventricle: There is mild concentric hypertrophy. There is low normal systolic function with a visually estimated ejection fraction of 50 - 55%. Grade III diastolic dysfunction.    Left Ventricle: The left ventricle is at the upper limits of normal in size. Mildly increased wall thickness. There is mild concentric hypertrophy. Regional wall motion abnormalities present. See diagram for wall motion findings. There is low normal systolic function with a visually estimated ejection fraction of 50 - 55%. Ejection fraction is approximately 53%. Grade III diastolic dysfunction.    Right Ventricle: Systolic function is borderline low despite the low TAPSE.    Left Atrium: Left atrium is moderately dilated.    Aortic Valve: The aortic valve is a trileaflet valve. There is mild aortic valve sclerosis. There is moderate annular  calcification present. There is mild aortic regurgitation with a centrally directed jet.    Mitral Valve: There is mild regurgitation with a centrally directed jet.    Pericardium: There is a trivial posterior effusion and under the RA.    Tricuspid Valve: There is mild regurgitation.    Pulmonary Artery: The estimated pulmonary artery systolic pressure is 30 mmHg.      CT Chest Without Contrast  Narrative: EXAMINATION:  CT CHEST WITHOUT CONTRAST    CLINICAL HISTORY:  Pneumonia, unresolved;    TECHNIQUE:  CT chest without contrast acquiring images from above the pulmonary apices to the upper abdomen.  Data was reconstructed for multiplanar images in axial, sagittal and coronal planes and for maximal intensity projection images in the the axial plane.    COMPARISON:  None.    FINDINGS:  Base of Neck/thoracic soft tissues: No significant abnormality.    Aorta: Left-sided aortic arch. No aneurysm. Three vessel aortic arch. Moderate calcified atherosclerosis of the thoracic aorta.    Heart/pericardium: Dilated left ventricle moderate multivessel calcified atherosclerosis of the coronary arteries.  No pericardial effusion.    Airways/Lungs/Pleura:  Central airways are patent. Bilateral multifocal ground glass opacities and small irregular consolidations associated with bronchial wall thickening.  Right moderate and left small pleural effusions.  Mild apical paraseptal emphysema.  No pneumothorax.    Pulmonary vasculature: Unremarkable.    Marlen/Mediastinum: No pathologic christiano enlargement.    Esophagus: Unremarkable.    Upper Abdomen: Moderate abdominal calcified atherosclerosis involving the celiac trunk    Bones: Anterior large bridging osteophytes between T8-T9. No suspicious osseous lesion.  Impression: Bilateral multilobar consolidations and ground-glass opacities associated with bronchial thickening suggestive of infectious/inflammatory process.    Right moderate and left small pleural effusions.    Electronically  signed by: Matthew Moore  Date:    05/01/2025  Time:    16:27      Labs, Imaging, EKG and Diagnostic results from 5/1/2025 were reviewed.    Medications:  Medication list was reviewed and changes noted under Assessment/Plan.  Medications Ordered Prior to Encounter[1]  Scheduled Medications:  Current Facility-Administered Medications   Medication Dose Route Frequency    atorvastatin  80 mg Oral Daily    ceFEPime IV (PEDS and ADULTS)  1 g Intravenous Q12H    doxazosin  4 mg Oral Daily    empagliflozin  10 mg Oral Daily    gabapentin  300 mg Oral Daily    gabapentin  300 mg Oral After lunch    gabapentin  300 mg Oral QHS    hydrALAZINE  100 mg Oral Q8H    hydroCHLOROthiazide  12.5 mg Oral Daily    insulin glargine U-100  15 Units Subcutaneous QHS    Lactobacillus rhamnosus GG  1 capsule Oral Daily    polyethylene glycol  17 g Oral Daily    predniSONE  5 mg Oral Daily    rivaroxaban  15 mg Oral Daily with dinner    tacrolimus  1 mg Oral BID    valsartan  320 mg Oral Daily     PRN:   Current Facility-Administered Medications:     acetaminophen, 650 mg, Oral, Q6H PRN    albuterol-ipratropium, 3 mL, Nebulization, Q6H PRN    dextrose 50%, 12.5 g, Intravenous, PRN    dextrose 50%, 25 g, Intravenous, PRN    glucagon (human recombinant), 1 mg, Intramuscular, PRN    glucose, 16 g, Oral, PRN    glucose, 24 g, Oral, PRN    hydrALAZINE, 15 mg, Intravenous, Q6H PRN    insulin aspart U-100, 0-10 Units, Subcutaneous, QID (AC + HS) PRN    meclizine, 25 mg, Oral, TID PRN    melatonin, 6 mg, Oral, Nightly PRN    naloxone, 0.02 mg, Intravenous, PRN    ondansetron, 8 mg, Oral, Q8H PRN    prochlorperazine, 5 mg, Intravenous, Q6H PRN    sodium chloride 0.9%, 10 mL, Intravenous, Q8H PRN  Infusions:   Estimated Creatinine Clearance: 32.4 mL/min (A) (based on SCr of 2.5 mg/dL (H)).             Assessment & Plan  Community acquired bacterial pneumonia  Hemoptysis    Patient has a diagnosis of pneumonia. The cause of the pneumonia is  suspected to be bacterial in etiology but organism is not known. The pneumonia is stable. The patient has the following signs/symptoms of pneumonia: cough. The patient does not have a current oxygen requirement and the patient does not have a home oxygen requirement. I have reviewed the pertinent imaging. The following cultures have been collected: Blood cultures and Sputum culture The culture results are listed below.     Current antimicrobial regimen consists of the antibiotics listed below. Will monitor patient closely and continue current treatment plan unchanged.    -Given fevers/chills and recurrent pna, reasonable to continue below abx and deescalate when indicated  -f/u sputum, blood cx    Antibiotics (From admission, onward)      Start     Stop Route Frequency Ordered    04/30/25 0900  azithromycin (ZITHROMAX) 500 mg in 0.9% NaCl 250 mL IVPB (admixture device)         05/01/25 1259 IV Every 24 hours (non-standard times) 04/29/25 1016    04/29/25 1145  ceFEPIme injection 1 g         -- IV Every 12 hours (non-standard times) 04/29/25 1037          Microbiology Results (last 7 days)       Procedure Component Value Units Date/Time    Blood culture x two cultures. Draw prior to antibiotics. [[id number]]  (Normal) Collected: 04/29/25 0758    Order Status: Completed Specimen: Blood from Peripheral, Forearm, Left Updated: 05/01/25 1801     Blood Culture No Growth After 48 Hours    Blood culture x two cultures. Draw prior to antibiotics. [[id number]]  (Normal) Collected: 04/29/25 0758    Order Status: Completed Specimen: Blood from Peripheral, Antecubital, Left Updated: 05/01/25 1801     Blood Culture No Growth After 48 Hours    MRSA Screen by PCR [[id number]]     Order Status: Sent Specimen: Nasal Swab     Culture, Respiratory with Gram Stain [[id number]]     Order Status: Sent Specimen: Respiratory     Culture, Respiratory with Gram Stain [[id number]]     Order Status: Sent Specimen: Respiratory     Influenza A &  B by Molecular [[id number]]  (Normal) Collected: 04/29/25 0749    Order Status: Completed Specimen: Nasal Swab Updated: 04/29/25 0836     INFLUENZA A MOLECULAR Negative     INFLUENZA B MOLECULAR  Negative          5/1  BC x2 4/29 NGTD. vancomycins discontinued.  respiratory cultures pending.  CT chest ordered -[facility] concerning for pneumonia possible hiatal hernia and possible anterior vertebral osteophyte near mid esophagus.  SLP consulted  and consider esophogram to assess for hiatal hernia and recurrent aspiration as a cause of repeat pneumonia. continue cefepime. completed azithromycin. denies hemoptysis this admission. last episode 2 weeks back. resumed prograf 1mg BID. continue prednisone. hold cellcept for now     Patient has a diagnosis of pneumonia. The cause of the pneumonia is suspected to be bacterial in etiology but organism is not known. The pneumonia is stable. The patient has the following signs/symptoms of pneumonia: cough. The patient does not have a current oxygen requirement and the patient does not have a home oxygen requirement. I have reviewed the pertinent imaging. The following cultures have been collected: Blood cultures and Sputum culture The culture results are listed below.     Current antimicrobial regimen consists of the antibiotics listed below. Will monitor patient closely and continue current treatment plan unchanged.    -Given fevers/chills and recurrent pna, reasonable to continue below abx and deescalate when indicated  -f/u sputum, blood cx    Antibiotics (From admission, onward)      Start     Stop Route Frequency Ordered    04/30/25 0900  azithromycin (ZITHROMAX) 500 mg in 0.9% NaCl 250 mL IVPB (admixture device)         05/01/25 1259 IV Every 24 hours (non-standard times) 04/29/25 1016    04/29/25 1145  ceFEPIme injection 1 g         -- IV Every 12 hours (non-standard times) 04/29/25 1037          Antibiotics (From admission, onward)      Start     Stop Route Frequency  "Ordered    04/30/25 0900  azithromycin (ZITHROMAX) 500 mg in 0.9% NaCl 250 mL IVPB (admixture device)         05/01/25 1259 IV Every 24 hours (non-standard times) 04/29/25 1016    04/29/25 1145  ceFEPIme injection 1 g         -- IV Every 12 hours (non-standard times) 04/29/25 1037          Type 2 diabetes mellitus with stage 3a chronic kidney disease, with long-term current use of insulin  Creatine stable for now. BMP reviewed- noted Estimated Creatinine Clearance: 32.4 mL/min (A) (based on SCr of 2.5 mg/dL (H)). according to latest data. Based on current GFR, CKD stage is stage 3 - GFR 30-59.  Monitor UOP and serial BMP and adjust therapy as needed. Renally dose meds. Avoid nephrotoxic medications and procedures.  Essential hypertension  Patient's blood pressure range in the last 24 hours was: BP  Min: 170/76  Max: 196/90.The patient's inpatient anti-hypertensive regimen is listed below:  Current Antihypertensives  doxazosin tablet 4 mg, Daily, Oral  hydrALAZINE tablet 100 mg, Every 8 hours, Oral  hydroCHLOROthiazide tablet 12.5 mg, Daily, Oral  valsartan tablet 320 mg, Daily, Oral  hydrALAZINE injection 15 mg, Every 6 hours PRN, Intravenous    Plan  - BP is controlled, no changes needed to their regimen  Hyperlipidemia  Continue statin    Prophylactic immunotherapy  KTM consutled for assistance with management     KTM consutled for assistance with management     KTM consutled for assistance with management     Chronic combined systolic (congestive) and diastolic (congestive) heart failure  Patient has Diastolic (HFpEF) heart failure that is Chronic. On presentation their CHF was well compensated. Most recent BNP and echo results are listed below.  No results for input(s): "BNP" in the last 72 hours.  Latest ECHO  Results for orders placed during the hospital encounter of 02/20/25    Echo    Interpretation Summary    Left Ventricle: There is mild concentric hypertrophy. There is low normal systolic function with a " visually estimated ejection fraction of 50 - 55%. Grade III diastolic dysfunction.    Left Ventricle: The left ventricle is at the upper limits of normal in size. Mildly increased wall thickness. There is mild concentric hypertrophy. Regional wall motion abnormalities present. See diagram for wall motion findings. There is low normal systolic function with a visually estimated ejection fraction of 50 - 55%. Ejection fraction is approximately 53%. Grade III diastolic dysfunction.    Right Ventricle: Systolic function is borderline low despite the low TAPSE.    Left Atrium: Left atrium is moderately dilated.    Aortic Valve: The aortic valve is a trileaflet valve. There is mild aortic valve sclerosis. There is moderate annular calcification present. There is mild aortic regurgitation with a centrally directed jet.    Mitral Valve: There is mild regurgitation with a centrally directed jet.    Pericardium: There is a trivial posterior effusion and under the RA.    Tricuspid Valve: There is mild regurgitation.    Pulmonary Artery: The estimated pulmonary artery systolic pressure is 30 mmHg.    Current Heart Failure Medications  hydrALAZINE tablet 100 mg, Every 8 hours, Oral  hydroCHLOROthiazide tablet 12.5 mg, Daily, Oral  valsartan tablet 320 mg, Daily, Oral  empagliflozin (Jardiance) tablet 10 mg, Daily, Oral  hydrALAZINE injection 15 mg, Every 6 hours PRN, Intravenous    Plan  - Monitor strict I&Os and daily weights.    - Place on telemetry  - Low sodium diet  - Cardiology has not been consulted  - The patient's volume status is at their baseline  S/P cadaveric kidney transplant 11/5/2016. ESRD secondary to HTN/DMII  -KTM consulted  -daily tacro level, dosing per KTM  -Cont prednisone    Immunocompromised state due to drug therapy  KTM consutled for assistance with management     KTM consutled for assistance with management     KTM consutled for assistance with management     CKD IV (severe)  Creatine stable for now.  BMP reviewed- noted Estimated Creatinine Clearance: 32.4 mL/min (A) (based on SCr of 2.5 mg/dL (H)). according to latest data. Based on current GFR, CKD stage is stage 3 - GFR 30-59.  Monitor UOP and serial BMP and adjust therapy as needed. Renally dose meds. Avoid nephrotoxic medications and procedures.    Recent Labs   Lab 04/29/25  0758 04/30/25  0619 05/01/25  0233 05/01/25  1159   BUN 49* 46* 47*  --    CREATININE 2.7* 2.9* 2.6* 2.5*       I & O   No intake or output data in the 24 hours ending 05/01/25 1833   Type 2 diabetes mellitus  Patient's FSGs are controlled on current medication regimen.  Last A1c reviewed-   Lab Results   Component Value Date    HGBA1C 8.3 (H) 03/27/2025     Most recent fingerstick glucose reviewed-   Recent Labs   Lab 04/30/25  2111 05/01/25  0820 05/01/25  1255   POCTGLUCOSE 189* 101 93     Current correctional scale  Medium  Maintain anti-hyperglycemic dose as follows-   Antihyperglycemics (From admission, onward)      Start     Stop Route Frequency Ordered    04/29/25 2100  insulin glargine U-100 (Lantus) pen 15 Units         -- SubQ Nightly 04/29/25 1016    04/29/25 1115  empagliflozin (Jardiance) tablet 10 mg        Question Answer Comment   Does this patient have a diagnosis of heart failure? Yes    Does this patient have type 1 diabetes or diabetic ketoacidosis? No    Does this patient have symptomatic hypotension? No    Is the patient NPO or pending major surgery in next 3 days or less? No        -- Oral Daily 04/29/25 1016    04/29/25 1113  insulin aspart U-100 pen 0-10 Units         -- SubQ Before meals & nightly PRN 04/29/25 1016          Hold Oral hypoglycemics while patient is in the hospital.    Blood glucose acceptable  Persistent atrial fibrillation  Patient has persistent (7 days or more) atrial fibrillation. Patient is currently in atrial fibrillation. [id number] Score: 3. The patients heart rate in the last 24 hours is as follows:  Pulse  Min: 76  Max: 106      Antiarrhythmics       Anticoagulants  rivaroxaban tablet 15 mg, With dinner, Oral    Plan  - Replete lytes with a goal of K>4, Mg >2  - Patient is anticoagulated, see medications listed above.  - Patient's afib is currently controlled  Anticoagulant long-term use  This patient has long term use on an anticoagulant with Select Anticoagulant(s): Direct oral anticoagulant: Rivaroxaban (Xarelto). Their long term anticoagulation will be Held or Continued: continued. They are on long term anticoagulation due to Reason for Anticoagulation: Atrial fibrillation.   BPH with urinary obstruction  Doxazosin   Immunosuppressed status  KTM consutled for assistance with management     KTM consutled for assistance with management     KTM consutled for assistance with management     Anemia of chronic disease  Anemia is likely due to chronic disease due to Chronic Kidney Disease. Most recent hemoglobin and hematocrit are listed below.  Recent Labs     04/29/25  0758 04/29/25  0804 04/30/25  0619 05/01/25  0233   HGB 9.0*  --  8.7* 8.3*   HCT 31.0* 29.1 29.9* 28.0*     Plan  - Monitor serial CBC: Daily  - Transfuse PRBC if patient becomes hemodynamically unstable, symptomatic or H/H drops below 7/21.  - Patient has not received any PRBC transfusions to date  - Patient's anemia is currently stable  Hypokalemia  Patient's most recent potassium results are listed below.   Recent Labs     04/29/25  0758 04/30/25  0619 05/01/25  0233   K 3.3* 3.9 3.5     Plan  - Replete potassium per protocol  - Monitor potassium Daily  - Patient's hypokalemia is stable    VTE Risk Mitigation (From admission, onward)           Ordered     rivaroxaban tablet 15 mg  With dinner         04/29/25 1016     IP VTE HIGH RISK PATIENT  Once         04/29/25 1016     Place sequential compression device  Until discontinued         04/29/25 1016                    Discharge Planning   UBALDO: 5/2/2025     Code Status: Full Code   Medical Readiness for Discharge Date:    Discharge Plan A: Home with family                        Cliff MAYS MD Deniz  Department of Hospital Medicine   [facility]         [1]   Current Facility-Administered Medications on File Prior to Encounter   Medication Dose Route Frequency Provider Last Rate Last Admin    balanced salt irrigation intra-ocular solution 1 drop  1 drop Right Eye On Call Procedure Jennifer Palacio MD        phenylephrine HCL 10% ophthalmic solution 1 drop  1 drop Right Eye PRN Jennifer Palacio MD        proparacaine 0.5 % ophthalmic solution 1 drop  1 drop Right Eye Daily PRN Jennifer Palacio MD        sodium chloride 0.9% flush 10 mL  10 mL Intravenous PRN Jennifer Palacio MD        TETRAcaine HCl (PF) 0.5 % Drop 1 drop  1 drop Right Eye PRJennifer Guzman MD         Current Outpatient Medications on File Prior to Encounter   Medication Sig Dispense Refill    atorvastatin (LIPITOR) 80 MG tablet Take 1 tablet (80 mg total) by mouth once daily. 90 tablet 3    blood sugar diagnostic Strp Use to check blood glucose 1 times daily, to use with insurance preferred meter 100 each 11    blood-glucose meter Misc Use to check blood glucose 1 times daily, to use with insurance preferred meter 1 each 0    blood-glucose sensor (DEXCOM G7 SENSOR) Kayla Change sensor every 10 days. 3 each 11    doxazosin (CARDURA) 4 MG tablet Take 1 tablet (4 mg total) by mouth once daily. 90 tablet 3    empagliflozin (JARDIANCE) 10 mg tablet Take 1 tablet (10 mg total) by mouth once daily. 90 tablet 0    ergocalciferol (ERGOCALCIFEROL) 50,000 unit Cap Take 1 capsule (50,000 Units total) by mouth every 7 days. 4 capsule 6    furosemide (LASIX) 40 MG tablet Take 2 tablets (80 mg total) by mouth daily as needed (for swelling).      gabapentin (NEURONTIN) 300 MG capsule Take 1 capsule by mouth in the morning, 1 capsule midday, and 3 capsules at night. 450 capsule 3    hydrALAZINE (APRESOLINE) 100 MG tablet Take 1 tablet (100 mg total) by mouth  "every 8 (eight) hours. 270 tablet 3    hydroCHLOROthiazide 12.5 MG Tab Take 1 tablet (12.5 mg total) by mouth once daily. 30 tablet 11    insulin aspart U-100 (NOVOLOG U-100 INSULIN ASPART) 100 unit/mL injection Use in pump, max daily 100 units. 30 mL 11    insulin aspart U-100 (NOVOLOG) 100 unit/mL (3 mL) InPn pen Inject 10 units under the skin w/ breakfast, 7 units at lunch and dinner. Scale 180-230+2, 231-280+4, 281-330+6, 331-380+8, >380+10.  Max daily 57 units. (Patient not taking: Reported on 4/22/2025) 30 mL 11    insulin glargine U-100, Lantus, (LANTUS SOLOSTAR U-100 INSULIN) 100 unit/mL (3 mL) InPn pen Inject 20 Units into the skin every morning. (Patient not taking: Reported on 4/22/2025) 15 mL 6    insulin  cart,aut,G6/7,cntr (OMNIPOD 5 G6-G7 INTRO KT,GEN5,) Crtg Use as directed. 1 each 1    insulin pump cart,auto,BT,G6/7 (OMNIPOD 5 G6-G7 PODS, GEN 5,) Crtg Change every 48 hours. 45 each 3    Lactobacillus rhamnosus GG (CULTURELLE) 10 billion cell capsule Take 1 capsule by mouth once daily. 30 capsule 0    lancets 30 gauge Misc Use to check blood glucose 1 times daily, to use with insurance preferred meter 100 each 3    magnesium oxide (MAG-OX) 400 mg (241.3 mg magnesium) tablet Take 1 tablet (400 mg total) by mouth 2 (two) times daily. 60 tablet 11    meclizine (ANTIVERT) 25 mg tablet Take 1 tablet (25 mg total) by mouth 3 (three) times daily as needed for Dizziness. 21 tablet 3    [Paused] mycophenolate (CELLCEPT) 250 mg Cap Take 2 capsules (500 mg total) by mouth 2 (two) times daily. 120 capsule 11    pen needle, diabetic (BD ULTRA-FINE LO PEN NEEDLE) 32 gauge x 5/32" Ndle USE TO ADMINISTER INSULIN 4 TIMES DAILY. 400 each 3    polyethylene glycol (MIRALAX) 17 gram PwPk Take 17 gm (mixed in liquid) by mouth every day.      predniSONE (DELTASONE) 5 MG tablet Take 1 tablet (5 mg total) by mouth once daily. 30 tablet 11    rivaroxaban (XARELTO) 15 mg Tab Take 1 tablet (15 mg total) by mouth daily with " dinner or evening meal. 30 tablet 11    tacrolimus (PROGRAF) 1 MG Cap Take 1 capsule (1 mg total) by mouth every 12 (twelve) hours. 60 capsule 11    valsartan (DIOVAN) 320 MG tablet Take 1 tablet (320 mg total) by mouth once daily. 90 tablet 3    [DISCONTINUED] insulin lispro (HUMALOG KWIKPEN INSULIN) 100 unit/mL pen Inject 18 units w/ breakfast, 16 units w/ lunch and dinner plus scale 150-200 +2, 201-250 +4, 251-300 +6, 301-350 +8. 30 mL 4

## 2025-05-01 NOTE — ASSESSMENT & PLAN NOTE
Patient has a diagnosis of pneumonia. The cause of the pneumonia is suspected to be bacterial in etiology but organism is not known. The pneumonia is stable. The patient has the following signs/symptoms of pneumonia: cough. The patient does not have a current oxygen requirement and the patient does not have a home oxygen requirement. I have reviewed the pertinent imaging. The following cultures have been collected: Blood cultures and Sputum culture The culture results are listed below.     Current antimicrobial regimen consists of the antibiotics listed below. Will monitor patient closely and continue current treatment plan unchanged.    -Given fevers/chills and recurrent pna, reasonable to continue below abx and deescalate when indicated  -f/u sputum, blood cx    Antibiotics (From admission, onward)      Start     Stop Route Frequency Ordered    04/30/25 0900  azithromycin (ZITHROMAX) 500 mg in 0.9% NaCl 250 mL IVPB (admixture device)         05/01/25 1259 IV Every 24 hours (non-standard times) 04/29/25 1016    04/29/25 1145  ceFEPIme injection 1 g         -- IV Every 12 hours (non-standard times) 04/29/25 1037          Microbiology Results (last 7 days)       Procedure Component Value Units Date/Time    Blood culture x two cultures. Draw prior to antibiotics. [[id number]]  (Normal) Collected: 04/29/25 0758    Order Status: Completed Specimen: Blood from Peripheral, Forearm, Left Updated: 05/01/25 1801     Blood Culture No Growth After 48 Hours    Blood culture x two cultures. Draw prior to antibiotics. [[id number]]  (Normal) Collected: 04/29/25 0758    Order Status: Completed Specimen: Blood from Peripheral, Antecubital, Left Updated: 05/01/25 1801     Blood Culture No Growth After 48 Hours    MRSA Screen by PCR [[id number]]     Order Status: Sent Specimen: Nasal Swab     Culture, Respiratory with Gram Stain [[id number]]     Order Status: Sent Specimen: Respiratory     Culture, Respiratory with Gram Stain  [[id number]]     Order Status: Sent Specimen: Respiratory     Influenza A & B by Molecular [[id number]]  (Normal) Collected: 04/29/25 0749    Order Status: Completed Specimen: Nasal Swab Updated: 04/29/25 0836     INFLUENZA A MOLECULAR Negative     INFLUENZA B MOLECULAR  Negative          5/1  BC x2 4/29 NGTD. vancomycins discontinued.  respiratory cultures pending.  CT chest ordered -RML GGO concerning for pneumonia possible hiatal hernia and possible anterior vertebral osteophyte near mid esophagus.  SLP consulted  and consider esophogram to assess for hiatal hernia and recurrent aspiration as a cause of repeat pneumonia. continue cefepime. completed azithromycin. denies hemoptysis this admission. last episode 2 weeks back. resumed prograf 1mg BID. continue prednisone. hold cellcept for now     Patient has a diagnosis of pneumonia. The cause of the pneumonia is suspected to be bacterial in etiology but organism is not known. The pneumonia is stable. The patient has the following signs/symptoms of pneumonia: cough. The patient does not have a current oxygen requirement and the patient does not have a home oxygen requirement. I have reviewed the pertinent imaging. The following cultures have been collected: Blood cultures and Sputum culture The culture results are listed below.     Current antimicrobial regimen consists of the antibiotics listed below. Will monitor patient closely and continue current treatment plan unchanged.    -Given fevers/chills and recurrent pna, reasonable to continue below abx and deescalate when indicated  -f/u sputum, blood cx    Antibiotics (From admission, onward)      Start     Stop Route Frequency Ordered    04/30/25 0900  azithromycin (ZITHROMAX) 500 mg in 0.9% NaCl 250 mL IVPB (admixture device)         05/01/25 1259 IV Every 24 hours (non-standard times) 04/29/25 1016    04/29/25 1145  ceFEPIme injection 1 g         -- IV Every 12 hours (non-standard times) 04/29/25 1037           Antibiotics (From admission, onward)      Start     Stop Route Frequency Ordered    04/30/25 0900  azithromycin (ZITHROMAX) 500 mg in 0.9% NaCl 250 mL IVPB (admixture device)         05/01/25 1259 IV Every 24 hours (non-standard times) 04/29/25 1016    04/29/25 1145  ceFEPIme injection 1 g         -- IV Every 12 hours (non-standard times) 04/29/25 1037

## 2025-05-01 NOTE — ASSESSMENT & PLAN NOTE
69 y.o. 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 On the night of 4/28 - 4/29, patient's wife measured fever of 103F with reported chills prompting visit to ED. Patient denied preceding cough, chest pain, sinus pain/pressure, headaches. He noted shortness of breath but says this is chronic and attributes to his heart failure.     Recommendations:  -- second episode of pneumonia since March 2025  -- some history and symptoms concerning for possible aspiration pneumonia, R lung affected during both episodes  -- denies dysphagia, however did have embolic R MCA CVA in 12/2024  -- CT chest 5/1/25 images review by pulm team (awaiting formal radiology read)   -- RML GGO concerning for pneumonia   -- possible hiatal hernia and possible anterior vertebral osteophyte near mid esophagus  -- given the above, recommend SLP eval and esophogram to assess for hiatal hernia and recurrent aspiration as a cause of repeat pneumonia  -- pending infectious studies, recommend course of augmentin if otherwise consistent with aspiration pneumonia

## 2025-05-01 NOTE — ASSESSMENT & PLAN NOTE
Patient's blood pressure range in the last 24 hours was: BP  Min: 170/76  Max: 196/90.The patient's inpatient anti-hypertensive regimen is listed below:  Current Antihypertensives  doxazosin tablet 4 mg, Daily, Oral  hydrALAZINE tablet 100 mg, Every 8 hours, Oral  hydroCHLOROthiazide tablet 12.5 mg, Daily, Oral  valsartan tablet 320 mg, Daily, Oral  hydrALAZINE injection 15 mg, Every 6 hours PRN, Intravenous    Plan  - BP is controlled, no changes needed to their regimen

## 2025-05-01 NOTE — PROGRESS NOTES
Name: Ravi Zamorano  Medical Record Number: [id number]  Date of Service: 05/01/2025  Note By: Honey Marley MD    RENAL TRANSPLANT PROGRESS NOTE    ORGAN: RIGHT KIDNEY  Donor Type: donation after brain death  PHS Increased Risk: yes  Cold Ischemia: 314 mins  Induction Medications: thymoglobulin    Reason for Follow-up: Reassessment of kidney transplant recipient and management of immunosuppression.    Summary: Mr. Zamorano is a 69 y.o. male with history of ESRD presumably 2/2 HTN/T2DM s/p DDRT (11/5/2016) on tacrolimus/pred, who was admitted on 4/29/2025 for pneumonia.    Interval History: Patient reports feeling well today. Able to ambulate around his room without issues. Denies lightheadedness, CP, SOB, swelling. Reports urinating well.     PMHx:  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    CKD (chronic kidney disease) stage 4, GFR 15-29 ml/min 07/29/2014    Colon polyps 2014    Depression - situational 07/29/2014    Diabetes type 2 since 2000 07/29/2014    Diabetic neuropathy 07/29/2014    Encounter for blood transfusion     History of cardioversion 10/03/2019    History of hepatitis C, s/p successful Rx w/ SVR24 - 9/2017 07/29/2014    Genotype 1a, treatment naive 10/2014 liver biopsy - grade 1 / stage 1 Completed Harvoni w/ SVR    Hyperlipidemia 07/29/2014    Hypertension     Neuropathy     Organ transplant candidate 07/29/2014    Pancreatitis     S/P cadaveric kidney transplant 11/5/2016. ESRD secondary to HTN/DMII 11/05/2016    Type 2 diabetes mellitus with stage 3a chronic kidney disease, with long-term current use of insulin 07/29/2014     Medications:   Scheduled Meds:   atorvastatin  80 mg Oral Daily    ceFEPime IV (PEDS and ADULTS)  1 g Intravenous Q12H    doxazosin  4 mg Oral Daily    empagliflozin  10 mg Oral Daily    gabapentin  300 mg Oral  Daily    gabapentin  300 mg Oral After lunch    gabapentin  300 mg Oral QHS    hydrALAZINE  100 mg Oral Q8H    hydroCHLOROthiazide  12.5 mg Oral Daily    insulin glargine U-100  15 Units Subcutaneous QHS    Lactobacillus rhamnosus GG  1 capsule Oral Daily    polyethylene glycol  17 g Oral Daily    predniSONE  5 mg Oral Daily    rivaroxaban  15 mg Oral Daily with dinner    tacrolimus  1 mg Oral BID    valsartan  320 mg Oral Daily     Continuous Infusions:    PRN Meds:.  Current Facility-Administered Medications:     acetaminophen, 650 mg, Oral, Q6H PRN    albuterol-ipratropium, 3 mL, Nebulization, Q6H PRN    dextrose 50%, 12.5 g, Intravenous, PRN    dextrose 50%, 25 g, Intravenous, PRN    glucagon (human recombinant), 1 mg, Intramuscular, PRN    glucose, 16 g, Oral, PRN    glucose, 24 g, Oral, PRN    hydrALAZINE, 15 mg, Intravenous, Q6H PRN    insulin aspart U-100, 0-10 Units, Subcutaneous, QID (AC + HS) PRN    meclizine, 25 mg, Oral, TID PRN    melatonin, 6 mg, Oral, Nightly PRN    naloxone, 0.02 mg, Intravenous, PRN    ondansetron, 8 mg, Oral, Q8H PRN    prochlorperazine, 5 mg, Intravenous, Q6H PRN    sodium chloride 0.9%, 10 mL, Intravenous, Q8H PRN    Review of Systems:   Reviewed. Pertinent positives and negatives included above.    Physical Exam:  Vitals:  Vitals:    05/01/25 1245   BP: (!) 172/86   Pulse: 78   Resp: 18   Temp: 98 °F (36.7 °C)     Temp:  [98 °F (36.7 °C)-98.4 °F (36.9 °C)] 98 °F (36.7 °C)  Pulse:  [76-87] 78  Resp:  [18] 18  SpO2:  [95 %-99 %] 96 %  BP: (157-196)/(50-92) 172/86    Exam  General: alert, conversational, NAD, sitting up on side of bed  HEENT: moist mucus membranes  Respiratory: crackles in RLL, breathing comfortably on RA  Cardiovacular: RRR, no murmur appreciated  Gastrointestinal: soft, nontender  Renal allograft exam: No tenderness, no bruits   Extremities: no edema  Skin: warm and dry     Labs:  Lab Results   Component Value Date    WBC 4.19 05/01/2025    HGB 8.3 (L)  05/01/2025    HCT 28.0 (L) 05/01/2025     05/01/2025    K 3.5 05/01/2025     05/01/2025    CO2 24 05/01/2025    BUN 47 (H) 05/01/2025    CREATININE 2.5 (H) 05/01/2025    EGFRNONAA 44.9 (A) 07/19/2022    CALCIUM 8.0 (L) 05/01/2025    PHOS 3.1 05/01/2025    MG 2.4 05/01/2025    ALBUMIN 2.8 (L) 04/29/2025    AST 14 04/29/2025    ALT <5 (L) 04/29/2025     Imaging Studies:  CXR 4/29/25:  Heart is enlarged, but allowing for magnification of the cardiomediastinal silhouette related both to projection and to a poorer inspiratory depth level than on the prior exam, I doubt that the heart has changed appreciably in size since that time. However, there has been a detrimental interval change in the appearance of the chest, as the current examination demonstrates pulmonary parenchymal opacity in the right lower lung zone consistent with the development of airspace consolidation since the prior exam. Given the patient's clinical history, this may represent acute pneumonia. The left lung and the remainder of the right lung are clear. No pleural fluid of any substantial volume is seen on either side. No pneumothorax. ?    Assessment/Plan:  69 y.o. male with ESRD presumably 2/2 HTN/T2DM s/p DDRT (11/5/2016) on tacrolimus/pred, who is admitted for RLL pneumonia.     ESRD 2/2 HTN/T2DM s/p cadaveric kidney transplant (11/5/16)  CKD4  - baseline Cr ~2's with eGFR ~20s  - kidney function has been slowly worsening   - follow strict I/Os, daily weights  - avoid nephrotoxic agents like NSAIDs, IV contrast, aminoglycoside-containing Abx  - renally dose medications    Immunosuppression Management  - tac level low this morning, but had not gotten tacrolimus for previous 2 days; tacrolimus restarted this morning  - continue Prograf 1mg BID; goal [id number] trough level is 5-7ng/ml  - continue prednisone 5mg daily  - recurrent PNAs/infection may be related to IS?    Chronic anemia  - Hb currently ~8-9   - Tsat 30% (02/2025)  - last known  ferritin in system 800s in 04/2024  - patient receives EPO injections outpatient  - continue to monitor      Honey Marley MD  [facility] Nephrology Fellow, PGY-4  Kidney Transplant Medicine

## 2025-05-01 NOTE — SUBJECTIVE & OBJECTIVE
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    CKD (chronic kidney disease) stage 4, GFR 15-29 ml/min 07/29/2014    Colon polyps 2014    Depression - situational 07/29/2014    Diabetes type 2 since 2000 07/29/2014    Diabetic neuropathy 07/29/2014    Encounter for blood transfusion     History of cardioversion 10/03/2019    History of hepatitis C, s/p successful Rx w/ SVR24 - 9/2017 07/29/2014    Genotype 1a, treatment naive 10/2014 liver biopsy - grade 1 / stage 1 Completed Harvoni w/ SVR    Hyperlipidemia 07/29/2014    Hypertension     Neuropathy     Organ transplant candidate 07/29/2014    Pancreatitis     S/P cadaveric kidney transplant 11/5/2016. ESRD secondary to HTN/DMII 11/05/2016    Type 2 diabetes mellitus with stage 3a chronic kidney disease, with long-term current use of insulin 07/29/2014       Past Surgical History:   Procedure Laterality Date    ABLATION OF ARRHYTHMOGENIC FOCUS FOR ATRIAL FIBRILLATION N/A 6/11/2024    Procedure: Ablation atrial fibrillation;  Surgeon: Bronson Bowden MD;  Location: [facility] EP LAB;  Service: Cardiology;  Laterality: N/A;  AF, FELICIANO (cx if SR), PVI, RFA, Carto, Gen, GP, 3 Prep    ABLATION, ATRIAL FLUTTER, TYPICAL  6/11/2024    Procedure: Ablation, Atrial Flutter, Typical;  Surgeon: Bronson Bowden MD;  Location: [facility] EP LAB;  Service: Cardiology;;    APPENDECTOMY      BONE MARROW BIOPSY Left 6/26/2024    Procedure: Biopsy-bone marrow;  Surgeon: Bridger Zapata MD;  Location: [facility] ENDO ([address] [place] [address]);  Service: Oncology;  Laterality: Left;  6/24-pt knows to hold aspirin and eliquis as instructed by hem/onc, precall complete-Kpvt    CARDIOVERSION  6/11/2024    Procedure: Cardioversion;  Surgeon: Bronson Bowden MD;  Location: [facility] EP LAB;  Service: Cardiology;;    CATARACT EXTRACTION W/  INTRAOCULAR LENS IMPLANT  Right 3/13/2024    Procedure: EXTRACTION, CATARACT, WITH IOL INSERTION;  Surgeon: Jennifer Palacio MD;  Location: [facility] OR;  Service: Ophthalmology;  Laterality: Right;    CATARACT EXTRACTION W/  INTRAOCULAR LENS IMPLANT Left 4/10/2024    Procedure: EXTRACTION, CATARACT, WITH IOL INSERTION;  Surgeon: Jennifer Palacio MD;  Location: [facility] OR;  Service: Ophthalmology;  Laterality: Left;    COLONOSCOPY N/A 12/21/2020    Procedure: COLONOSCOPY;  Surgeon: Tico Bell MD;  Location: [facility] ENDO (4TH FLR);  Service: Endoscopy;  Laterality: N/A;  ok to hold eliquis per Dr. Valadez, see telephone encounter 11/13/2020-MS  covid test 12/18 [place]    ECHOCARDIOGRAM,TRANSESOPHAGEAL N/A 2/3/2025    Procedure: Transesophageal echo (FELICIANO) intra-procedure log documentation;  Surgeon: Grayson Lin III, MD;  Location: [facility] EP LAB;  Service: Cardiology;  Laterality: N/A;    KIDNEY TRANSPLANT      TRANSESOPHAGEAL ECHOCARDIOGRAPHY N/A 8/26/2019    Procedure: ECHOCARDIOGRAM, TRANSESOPHAGEAL;  Surgeon: [facility];  Location: [facility] EP LAB;  Service: Cardiology;  Laterality: N/A;    TRANSESOPHAGEAL ECHOCARDIOGRAPHY N/A 6/11/2024    Procedure: ECHOCARDIOGRAM, TRANSESOPHAGEAL;  Surgeon: Grayson Lin III, MD;  Location: [facility] EP LAB;  Service: Cardiology;  Laterality: N/A;    TREATMENT OF CARDIAC ARRHYTHMIA N/A 8/26/2019    Procedure: CARDIOVERSION;  Surgeon: Bronson Bowden MD;  Location: [facility] EP LAB;  Service: Cardiology;  Laterality: N/A;  AF, FELICIANO, DCCV, MAC, GP, 3 PREP    TREATMENT OF CARDIAC ARRHYTHMIA N/A 2/3/2025    Procedure: Cardioversion or Defibrillation;  Surgeon: Bronson Bowden MD;  Location: [facility] EP LAB;  Service: Cardiology;  Laterality: N/A;  AF, FELICIANO, DCCV, MAC, GP, 3 Prep       Review of patient's allergies indicates:   Allergen Reactions    Nifedipine Other (See Comments)     Gingival hyperplasia       Family History       Problem Relation (Age of Onset)    Cancer Brother    Diabetes Mother    Heart  disease Mother, Father    Hypertension Mother, Brother          Tobacco Use    Smoking status: Former     Current packs/day: 0.00     Average packs/day: 0.5 packs/day for 32.0 years (16.0 ttl pk-yrs)     Types: Cigarettes     Start date: 1984     Quit date: 2016     Years since quittin.4    Smokeless tobacco: Never   Substance and Sexual Activity    Alcohol use: Yes     Comment: Pt reports occasional beers on Sundays. Pt reports drinking daily prior to ESRD diagnosis.    Drug use: No    Sexual activity: Yes     Partners: Female         Review of Systems   Constitutional:  Positive for chills and fever.   HENT:  Negative for drooling, nosebleeds and trouble swallowing.    Respiratory:  Positive for cough and shortness of breath. Negative for chest tightness.    Cardiovascular:  Negative for chest pain, palpitations and leg swelling.   Gastrointestinal:  Negative for diarrhea, nausea and vomiting.   Musculoskeletal:  Positive for myalgias (L Leg pain).     Objective:     Vital Signs (Most Recent):  Temp: 98 °F (36.7 °C) (25 08)  Pulse: 78 (25 1053)  Resp: 18 (25 08)  BP: (!) 170/76 (25 1015)  SpO2: 95 % (25 08) Vital Signs (24h Range):  Temp:  [98 °F (36.7 °C)-98.4 °F (36.9 °C)] 98 °F (36.7 °C)  Pulse:  [76-87] 78  Resp:  [18] 18  SpO2:  [95 %-99 %] 95 %  BP: (157-196)/(50-92) 170/76     Weight: 90.7 kg (200 lb)  Body mass index is 26.39 kg/m².    No intake or output data in the 24 hours ending 25 1240     Physical Exam  Vitals and nursing note reviewed.   Constitutional:       General: He is not in acute distress.     Appearance: Normal appearance. He is not ill-appearing.   HENT:      Head: Normocephalic and atraumatic.      Nose: Nose normal.   Eyes:      Extraocular Movements: Extraocular movements intact.   Cardiovascular:      Rate and Rhythm: Normal rate and regular rhythm.   Pulmonary:      Effort: Pulmonary effort is normal. No respiratory distress.       Breath sounds: Normal breath sounds. No wheezing, rhonchi or rales.   Abdominal:      Palpations: Abdomen is soft.   Musculoskeletal:      Right lower leg: Edema (mild) present.      Left lower leg: Edema (mild) present.   Skin:     General: Skin is warm and dry.   Neurological:      Mental Status: He is alert and oriented to person, place, and time.          Vents:       Lines/Drains/Airways       Peripheral Intravenous Line  Duration                  Peripheral IV - Single Lumen 04/30/25 1112 20 G Anterior;Distal;Right Upper Arm 1 day                    Significant Labs:    CBC/Anemia Profile:  Recent Labs   Lab 04/30/25  0619 05/01/25  0233   WBC 5.03 4.19   HGB 8.7* 8.3*   HCT 29.9* 28.0*   * 108*   MCV 77* 76*   RDW 19.7* 19.5*        Chemistries:  Recent Labs   Lab 04/30/25  0619 05/01/25  0233   * 136   K 3.9 3.5    104   CO2 24 24   BUN 46* 47*   CREATININE 2.9* 2.6*   CALCIUM 8.0* 8.0*   MG 2.3 2.4   PHOS 3.2 3.1       All pertinent labs within the past 24 hours have been reviewed.    Significant Imaging:   I have reviewed all pertinent imaging results/findings within the past 24 hours.

## 2025-05-01 NOTE — ASSESSMENT & PLAN NOTE
Patient has a diagnosis of pneumonia. The cause of the pneumonia is suspected to be bacterial in etiology but organism is not known. The pneumonia is stable. The patient has the following signs/symptoms of pneumonia: cough. The patient does not have a current oxygen requirement and the patient does not have a home oxygen requirement. I have reviewed the pertinent imaging. The following cultures have been collected: Blood cultures and Sputum culture The culture results are listed below.     Current antimicrobial regimen consists of the antibiotics listed below. Will monitor patient closely and continue current treatment plan unchanged.    -Given fevers/chills and recurrent pna, reasonable to continue below abx and deescalate when indicated  -f/u sputum, blood cx    Antibiotics (From admission, onward)      Start     Stop Route Frequency Ordered    05/02/25 1000  ceFEPIme injection 2 g         -- IV Every 12 hours (non-standard times) 05/02/25 0745    04/30/25 0900  azithromycin (ZITHROMAX) 500 mg in 0.9% NaCl 250 mL IVPB (admixture device)         05/01/25 1259 IV Every 24 hours (non-standard times) 04/29/25 1016          Microbiology Results (last 7 days)       Procedure Component Value Units Date/Time    Respiratory Infection Panel (PCR), Nasopharyngeal [[id number]] Collected: 05/02/25 1027    Order Status: Sent Specimen: Nasopharyngeal Swab Updated: 05/02/25 1434    Blood culture x two cultures. Draw prior to antibiotics. [[id number]]  (Normal) Collected: 04/29/25 0758    Order Status: Completed Specimen: Blood from Peripheral, Forearm, Left Updated: 05/01/25 1801     Blood Culture No Growth After 48 Hours    Blood culture x two cultures. Draw prior to antibiotics. [[id number]]  (Normal) Collected: 04/29/25 0758    Order Status: Completed Specimen: Blood from Peripheral, Antecubital, Left Updated: 05/01/25 1801     Blood Culture No Growth After 48 Hours    MRSA Screen by PCR [[id number]]     Order Status: Sent  Specimen: Nasal Swab     Culture, Respiratory with Gram Stain [[id number]]     Order Status: Sent Specimen: Respiratory     Culture, Respiratory with Gram Stain [[id number]]     Order Status: Sent Specimen: Respiratory     Influenza A & B by Molecular [[id number]]  (Normal) Collected: 04/29/25 0749    Order Status: Completed Specimen: Nasal Swab Updated: 04/29/25 0836     INFLUENZA A MOLECULAR Negative     INFLUENZA B MOLECULAR  Negative          5/1  BC x2 4/29 NGTD. vancomycins discontinued.  respiratory cultures pending.  CT chest ordered -L GGO concerning for pneumonia possible hiatal hernia and possible anterior vertebral osteophyte near mid esophagus.  SLP consulted  and consider esophogram to assess for hiatal hernia and recurrent aspiration as a cause of repeat pneumonia. continue cefepime. completed azithromycin. denies hemoptysis this admission. last episode 2 weeks back. resumed prograf 1mg BID. continue prednisone. hold cellcept for now     Patient has a diagnosis of pneumonia. The cause of the pneumonia is suspected to be bacterial in etiology but organism is not known. The pneumonia is stable. The patient has the following signs/symptoms of pneumonia: cough. The patient does not have a current oxygen requirement and the patient does not have a home oxygen requirement. I have reviewed the pertinent imaging. The following cultures have been collected: Blood cultures and Sputum culture The culture results are listed below.     Current antimicrobial regimen consists of the antibiotics listed below. Will monitor patient closely and continue current treatment plan unchanged.    -Given fevers/chills and recurrent pna, reasonable to continue below abx and deescalate when indicated  -f/u sputum, blood cx    Antibiotics (From admission, onward)      Start     Stop Route Frequency Ordered    05/02/25 1000  ceFEPIme injection 2 g         -- IV Every 12 hours (non-standard times) 05/02/25 0745    04/30/25 0900   azithromycin (ZITHROMAX) 500 mg in 0.9% NaCl 250 mL IVPB (admixture device)         05/01/25 1259 IV Every 24 hours (non-standard times) 04/29/25 1016          Antibiotics (From admission, onward)      Start     Stop Route Frequency Ordered    05/02/25 1000  ceFEPIme injection 2 g         -- IV Every 12 hours (non-standard times) 05/02/25 0745    04/30/25 0900  azithromycin (ZITHROMAX) 500 mg in 0.9% NaCl 250 mL IVPB (admixture device)         05/01/25 1259 IV Every 24 hours (non-standard times) 04/29/25 1016          5/1  PMH of DM, renal transplant 2016, CHFpEF, AF/AFL (PVI/PWI/CTI 6/2024), CVA, recurrent PNA who presents with fevers, chills.  States that symptoms are similar to last month when he was admitted for pneumonia. No other pna symptoms despite CXR changes.BC x2 4/29 NGTD. vancomycins discontinued.  respiratory cultures pending.  CT chest ordered -L GGO concerning for pneumonia possible hiatal hernia and possible anterior vertebral osteophyte near mid esophagus.  SLP consulted  and  barium esophogram to assess for hiatal hernia (second episode of pneumonia since March 2025) and recurrent aspiration as a cause of repeat pneumonia. continue cefepime. completed azithromycin. denies hemoptysis this admission. last episode 2 weeks back. resumed prograf 1mg BID. continue prednisone. hold cellcept for now     Patient has a diagnosis of pneumonia. The cause of the pneumonia is suspected to be bacterial in etiology but organism is not known. The pneumonia is stable. The patient has the following signs/symptoms of pneumonia: cough. The patient does not have a current oxygen requirement and the patient does not have a home oxygen requirement. I have reviewed the pertinent imaging. The following cultures have been collected: Blood cultures and Sputum culture The culture results are listed below.     Current antimicrobial regimen consists of the antibiotics listed below. Will monitor patient closely and continue  current treatment plan unchanged.    -Given fevers/chills and recurrent pna, reasonable to continue below abx and deescalate when indicated  -f/u sputum, blood cx    Antibiotics (From admission, onward)      Start     Stop Route Frequency Ordered    05/02/25 1000  ceFEPIme injection 2 g         -- IV Every 12 hours (non-standard times) 05/02/25 0745    04/30/25 0900  azithromycin (ZITHROMAX) 500 mg in 0.9% NaCl 250 mL IVPB (admixture device)         05/01/25 1259 IV Every 24 hours (non-standard times) 04/29/25 1016          Microbiology Results (last 7 days)       Procedure Component Value Units Date/Time    Respiratory Infection Panel (PCR), Nasopharyngeal [[id number]] Collected: 05/02/25 1027    Order Status: Sent Specimen: Nasopharyngeal Swab Updated: 05/02/25 1434    Blood culture x two cultures. Draw prior to antibiotics. [[id number]]  (Normal) Collected: 04/29/25 0758    Order Status: Completed Specimen: Blood from Peripheral, Forearm, Left Updated: 05/01/25 1801     Blood Culture No Growth After 48 Hours    Blood culture x two cultures. Draw prior to antibiotics. [[id number]]  (Normal) Collected: 04/29/25 0758    Order Status: Completed Specimen: Blood from Peripheral, Antecubital, Left Updated: 05/01/25 1801     Blood Culture No Growth After 48 Hours    MRSA Screen by PCR [[id number]]     Order Status: Sent Specimen: Nasal Swab     Culture, Respiratory with Gram Stain [[id number]]     Order Status: Sent Specimen: Respiratory     Culture, Respiratory with Gram Stain [[id number]]     Order Status: Sent Specimen: Respiratory     Influenza A & B by Molecular [[id number]]  (Normal) Collected: 04/29/25 0749    Order Status: Completed Specimen: Nasal Swab Updated: 04/29/25 0836     INFLUENZA A MOLECULAR Negative     INFLUENZA B MOLECULAR  Negative          5/1  BC x2 4/29 NGTD. vancomycins discontinued.  respiratory cultures pending.  CT chest ordered -L GGO concerning for pneumonia possible hiatal hernia and  possible anterior vertebral osteophyte near mid esophagus.  SLP consulted  and consider esophogram to assess for hiatal hernia and recurrent aspiration as a cause of repeat pneumonia. continue cefepime. completed azithromycin. denies hemoptysis this admission. last episode 2 weeks back. resumed prograf 1mg BID. continue prednisone. hold cellcept for now     Patient has a diagnosis of pneumonia. The cause of the pneumonia is suspected to be bacterial in etiology but organism is not known. The pneumonia is stable. The patient has the following signs/symptoms of pneumonia: cough. The patient does not have a current oxygen requirement and the patient does not have a home oxygen requirement. I have reviewed the pertinent imaging. The following cultures have been collected: Blood cultures and Sputum culture The culture results are listed below.     Current antimicrobial regimen consists of the antibiotics listed below. Will monitor patient closely and continue current treatment plan unchanged.    -Given fevers/chills and recurrent pna, reasonable to continue below abx and deescalate when indicated  -f/u sputum, blood cx    Antibiotics (From admission, onward)      Start     Stop Route Frequency Ordered    05/02/25 1000  ceFEPIme injection 2 g         -- IV Every 12 hours (non-standard times) 05/02/25 0745    04/30/25 0900  azithromycin (ZITHROMAX) 500 mg in 0.9% NaCl 250 mL IVPB (admixture device)         05/01/25 1259 IV Every 24 hours (non-standard times) 04/29/25 1016          Antibiotics (From admission, onward)      Start     Stop Route Frequency Ordered    05/02/25 1000  ceFEPIme injection 2 g         -- IV Every 12 hours (non-standard times) 05/02/25 0745    04/30/25 0900  azithromycin (ZITHROMAX) 500 mg in 0.9% NaCl 250 mL IVPB (admixture device)         05/01/25 1259 IV Every 24 hours (non-standard times) 04/29/25 1016

## 2025-05-01 NOTE — ASSESSMENT & PLAN NOTE
KTM consutled for assistance with management     KTM consutled for assistance with management     KTM consutled for assistance with management

## 2025-05-01 NOTE — ASSESSMENT & PLAN NOTE
Patient has persistent (7 days or more) atrial fibrillation. Patient is currently in atrial fibrillation. [id number] Score: 3. The patients heart rate in the last 24 hours is as follows:  Pulse  Min: 76  Max: 106     Antiarrhythmics       Anticoagulants  rivaroxaban tablet 15 mg, With dinner, Oral    Plan  - Replete lytes with a goal of K>4, Mg >2  - Patient is anticoagulated, see medications listed above.  - Patient's afib is currently controlled

## 2025-05-01 NOTE — HPI
Ravi Zamorano is a 69 y.o. 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 On the night of 4/28 - 4/29, patient's wife measured fever of 103F with reported chills prompting visit to ED. Patient denied preceding cough, chest pain, sinus pain/pressure, headaches. He noted shortness of breath but says this is chronic and attributes to his diagnosis of heart failure. In regards to his reported hemoptysis, he states during his last admission for pneumonia in March 2025, he noted some scant blood-tinged sputum when he clears this throat in the morning. He states this has since resolved. Denies chary blood or epistaxis. During his recent hospitalization, he completed antibiotics course, including oral vancomycin for Cdiff PCR/Ag-positive, toxin-negative diarrhea. He was seen by primary care 4/22/25 and reported no ongoing symptoms of cough, fever, or diarrhea at that time.     He is currently admitted for community acquired bacterial pneumonia and was started on IV empiric antibiotics. He is hypertensive but afebrile and otherwise hemodynamically stable on room air. CT chest was ordered and currently in process. Anemia and thrombocytopenia appear stable. He is a former cigarette smoker, 16 pack-year history. Quit about 8 years ago. He worked as a  but is now retired. He has gastric reflux and notes nocturnal symptoms. Denies dysphagia. Pulmonology was consulted for hemoptysis.

## 2025-05-01 NOTE — ASSESSMENT & PLAN NOTE
Creatine stable for now. BMP reviewed- noted Estimated Creatinine Clearance: 32.4 mL/min (A) (based on SCr of 2.5 mg/dL (H)). according to latest data. Based on current GFR, CKD stage is stage 3 - GFR 30-59.  Monitor UOP and serial BMP and adjust therapy as needed. Renally dose meds. Avoid nephrotoxic medications and procedures.    Recent Labs   Lab 04/29/25  0758 04/30/25  0619 05/01/25  0233 05/01/25  1159   BUN 49* 46* 47*  --    CREATININE 2.7* 2.9* 2.6* 2.5*       I & O   No intake or output data in the 24 hours ending 05/01/25 6885

## 2025-05-01 NOTE — ASSESSMENT & PLAN NOTE
In regards to his reported hemoptysis, he states during his last admission for pneumonia in March 2025, he noted some scant blood-tinged sputum when he clears this throat in the morning. He states this has since resolved. Denies chary blood or epistaxis.      He is currently admitted for community acquired bacterial pneumonia and was started on IV empiric antibiotics. He is hypertensive but afebrile and otherwise hemodynamically stable on room air. CT chest was ordered and currently in process. Anemia and thrombocytopenia appear stable. He is a former cigarette smoker, 16 pack-year history. Quit about 8 years ago. He worked as a  but is now retired. He has gastric reflux and notes nocturnal symptoms. Denies dysphagia. Pulmonology was consulted for hemoptysis    Recommendations:  -- denies active or ongoing hemoptysis at this time, Hgb and platelets low but stable. Benign exam  -- symptom onset correlated with pneumonia, likely 2/2 inflammation  -- f/u formal CT chest read, on CT chest images review with pulmonology team no obvious bronchiectasis or lesions concerning for malignancy  -- outpatient referral to pulmonology as needed if recurrent symptoms

## 2025-05-01 NOTE — ASSESSMENT & PLAN NOTE
Patient has a diagnosis of pneumonia. The cause of the pneumonia is suspected to be bacterial in etiology but organism is not known. The pneumonia is stable. The patient has the following signs/symptoms of pneumonia: cough. The patient does not have a current oxygen requirement and the patient does not have a home oxygen requirement. I have reviewed the pertinent imaging. The following cultures have been collected: Blood cultures and Sputum culture The culture results are listed below.     Current antimicrobial regimen consists of the antibiotics listed below. Will monitor patient closely and continue current treatment plan unchanged.    -Given fevers/chills and recurrent pna, reasonable to continue below abx and deescalate when indicated  -f/u sputum, blood cx    Antibiotics (From admission, onward)      Start     Stop Route Frequency Ordered    04/30/25 0900  azithromycin (ZITHROMAX) 500 mg in 0.9% NaCl 250 mL IVPB (admixture device)         05/01/25 1259 IV Every 24 hours (non-standard times) 04/29/25 1016    04/29/25 1145  ceFEPIme injection 1 g         -- IV Every 12 hours (non-standard times) 04/29/25 1037          Microbiology Results (last 7 days)       Procedure Component Value Units Date/Time    Blood culture x two cultures. Draw prior to antibiotics. [[id number]]  (Normal) Collected: 04/29/25 0758    Order Status: Completed Specimen: Blood from Peripheral, Forearm, Left Updated: 05/01/25 1801     Blood Culture No Growth After 48 Hours    Blood culture x two cultures. Draw prior to antibiotics. [[id number]]  (Normal) Collected: 04/29/25 0758    Order Status: Completed Specimen: Blood from Peripheral, Antecubital, Left Updated: 05/01/25 1801     Blood Culture No Growth After 48 Hours    MRSA Screen by PCR [[id number]]     Order Status: Sent Specimen: Nasal Swab     Culture, Respiratory with Gram Stain [[id number]]     Order Status: Sent Specimen: Respiratory     Culture, Respiratory with Gram Stain  [[id number]]     Order Status: Sent Specimen: Respiratory     Influenza A & B by Molecular [[id number]]  (Normal) Collected: 04/29/25 0749    Order Status: Completed Specimen: Nasal Swab Updated: 04/29/25 0836     INFLUENZA A MOLECULAR Negative     INFLUENZA B MOLECULAR  Negative          5/1  BC x2 4/29 NGTD. vancomycins discontinued.  respiratory cultures pending.  CT chest ordered -RML GGO concerning for pneumonia possible hiatal hernia and possible anterior vertebral osteophyte near mid esophagus.  SLP consulted  and consider esophogram to assess for hiatal hernia and recurrent aspiration as a cause of repeat pneumonia. continue cefepime. completed azithromycin. denies hemoptysis this admission. last episode 2 weeks back. resumed prograf 1mg BID. continue prednisone. hold cellcept for now     Patient has a diagnosis of pneumonia. The cause of the pneumonia is suspected to be bacterial in etiology but organism is not known. The pneumonia is stable. The patient has the following signs/symptoms of pneumonia: cough. The patient does not have a current oxygen requirement and the patient does not have a home oxygen requirement. I have reviewed the pertinent imaging. The following cultures have been collected: Blood cultures and Sputum culture The culture results are listed below.     Current antimicrobial regimen consists of the antibiotics listed below. Will monitor patient closely and continue current treatment plan unchanged.    -Given fevers/chills and recurrent pna, reasonable to continue below abx and deescalate when indicated  -f/u sputum, blood cx    Antibiotics (From admission, onward)      Start     Stop Route Frequency Ordered    04/30/25 0900  azithromycin (ZITHROMAX) 500 mg in 0.9% NaCl 250 mL IVPB (admixture device)         05/01/25 1259 IV Every 24 hours (non-standard times) 04/29/25 1016    04/29/25 1145  ceFEPIme injection 1 g         -- IV Every 12 hours (non-standard times) 04/29/25 1037           Antibiotics (From admission, onward)      Start     Stop Route Frequency Ordered    04/30/25 0900  azithromycin (ZITHROMAX) 500 mg in 0.9% NaCl 250 mL IVPB (admixture device)         05/01/25 1259 IV Every 24 hours (non-standard times) 04/29/25 1016    04/29/25 1145  ceFEPIme injection 1 g         -- IV Every 12 hours (non-standard times) 04/29/25 1037

## 2025-05-02 PROBLEM — J90 PLEURAL EFFUSION: Status: ACTIVE | Noted: 2025-05-02

## 2025-05-02 PROBLEM — J18.9 PNEUMONIA: Status: ACTIVE | Noted: 2025-04-29

## 2025-05-02 LAB
ABSOLUTE EOSINOPHIL (OHS): 0.06 K/UL
ABSOLUTE MONOCYTE (OHS): 0.86 K/UL (ref 0.3–1)
ABSOLUTE NEUTROPHIL COUNT (OHS): 3.75 K/UL (ref 1.8–7.7)
ANION GAP (OHS): 10 MMOL/L (ref 8–16)
B PERT DNA NPH QL NAA+PROBE: NOT DETECTED
BASOPHILS # BLD AUTO: 0.02 K/UL
BASOPHILS NFR BLD AUTO: 0.4 %
BUN SERPL-MCNC: 48 MG/DL (ref 8–23)
C PNEUM DNA LOWER RESP QL NAA+NON-PROBE: NOT DETECTED
CALCIUM SERPL-MCNC: 8.1 MG/DL (ref 8.7–10.5)
CHLORIDE SERPL-SCNC: 104 MMOL/L (ref 95–110)
CO2 SERPL-SCNC: 21 MMOL/L (ref 23–29)
CREAT SERPL-MCNC: 2.4 MG/DL (ref 0.5–1.4)
ERYTHROCYTE [DISTWIDTH] IN BLOOD BY AUTOMATED COUNT: 19.9 % (ref 11.5–14.5)
FLUAV RNA NPH QL NAA+NON-PROBE: NOT DETECTED
FLUBV RNA NPH QL NAA+NON-PROBE: NOT DETECTED
GFR SERPLBLD CREATININE-BSD FMLA CKD-EPI: 29 ML/MIN/1.73/M2
GLUCOSE SERPL-MCNC: 158 MG/DL (ref 70–110)
HADV DNA NPH QL NAA+NON-PROBE: NOT DETECTED
HCOV 229E RNA NPH QL NAA+NON-PROBE: NOT DETECTED
HCOV HKU1 RNA NPH QL NAA+NON-PROBE: NOT DETECTED
HCOV NL63 RNA NPH QL NAA+NON-PROBE: NOT DETECTED
HCOV OC43 RNA NPH QL NAA+NON-PROBE: NOT DETECTED
HCT VFR BLD AUTO: 29 % (ref 40–54)
HGB BLD-MCNC: 8.5 GM/DL (ref 14–18)
HMPV RNA LOWER RESP QL NAA+NON-PROBE: NOT DETECTED
HMPV RNA NPH QL NAA+NON-PROBE: NOT DETECTED
HPIV1 RNA NPH QL NAA+NON-PROBE: NOT DETECTED
HPIV2 RNA NPH QL NAA+NON-PROBE: NOT DETECTED
HPIV3 RNA NPH QL NAA+NON-PROBE: NOT DETECTED
HPIV4 RNA NPH QL NAA+NON-PROBE: NOT DETECTED
IMM GRANULOCYTES # BLD AUTO: 0.02 K/UL (ref 0–0.04)
IMM GRANULOCYTES NFR BLD AUTO: 0.4 % (ref 0–0.5)
LYMPHOCYTES # BLD AUTO: 1 K/UL (ref 1–4.8)
MAGNESIUM SERPL-MCNC: 2.3 MG/DL (ref 1.6–2.6)
MCH RBC QN AUTO: 22.7 PG (ref 27–31)
MCHC RBC AUTO-ENTMCNC: 29.3 G/DL (ref 32–36)
MCV RBC AUTO: 78 FL (ref 82–98)
NUCLEATED RBC (/100WBC) (OHS): 0 /100 WBC
PHOSPHATE SERPL-MCNC: 2.8 MG/DL (ref 2.7–4.5)
PLATELET # BLD AUTO: 107 K/UL (ref 150–450)
PMV BLD AUTO: ABNORMAL FL
POCT GLUCOSE: 139 MG/DL (ref 70–110)
POCT GLUCOSE: 147 MG/DL (ref 70–110)
POCT GLUCOSE: 161 MG/DL (ref 70–110)
POCT GLUCOSE: 168 MG/DL (ref 70–110)
POCT GLUCOSE: 85 MG/DL (ref 70–110)
POTASSIUM SERPL-SCNC: 3.5 MMOL/L (ref 3.5–5.1)
RBC # BLD AUTO: 3.74 M/UL (ref 4.6–6.2)
RELATIVE EOSINOPHIL (OHS): 1.1 %
RELATIVE LYMPHOCYTE (OHS): 17.5 % (ref 18–48)
RELATIVE MONOCYTE (OHS): 15.1 % (ref 4–15)
RELATIVE NEUTROPHIL (OHS): 65.5 % (ref 38–73)
RSV RNA NPH QL NAA+NON-PROBE: NOT DETECTED
RSV RNA NPH QL NAA+NON-PROBE: NOT DETECTED
RV+EV RNA NPH QL NAA+NON-PROBE: NOT DETECTED
SARS-COV-2 RNA RESP QL NAA+PROBE: NOT DETECTED
SODIUM SERPL-SCNC: 135 MMOL/L (ref 136–145)
SPECIMEN SOURCE: NORMAL
TACROLIMUS BLD-MCNC: 4.3 NG/ML (ref 5–15)
VANCOMYCIN SERPL-MCNC: 19.2 UG/ML (ref ?–80)
WBC # BLD AUTO: 5.71 K/UL (ref 3.9–12.7)

## 2025-05-02 PROCEDURE — 80048 BASIC METABOLIC PNL TOTAL CA: CPT | Mod: HCNC | Performed by: STUDENT IN AN ORGANIZED HEALTH CARE EDUCATION/TRAINING PROGRAM

## 2025-05-02 PROCEDURE — 80197 ASSAY OF TACROLIMUS: CPT | Mod: HCNC | Performed by: STUDENT IN AN ORGANIZED HEALTH CARE EDUCATION/TRAINING PROGRAM

## 2025-05-02 PROCEDURE — 63600175 PHARM REV CODE 636 W HCPCS: Mod: HCNC | Performed by: STUDENT IN AN ORGANIZED HEALTH CARE EDUCATION/TRAINING PROGRAM

## 2025-05-02 PROCEDURE — 25000003 PHARM REV CODE 250: Mod: HCNC

## 2025-05-02 PROCEDURE — 36415 COLL VENOUS BLD VENIPUNCTURE: CPT | Mod: HCNC | Performed by: HOSPITALIST

## 2025-05-02 PROCEDURE — 92611 MOTION FLUOROSCOPY/SWALLOW: CPT | Mod: HCNC

## 2025-05-02 PROCEDURE — 84100 ASSAY OF PHOSPHORUS: CPT | Mod: HCNC | Performed by: STUDENT IN AN ORGANIZED HEALTH CARE EDUCATION/TRAINING PROGRAM

## 2025-05-02 PROCEDURE — 21400001 HC TELEMETRY ROOM: Mod: HCNC

## 2025-05-02 PROCEDURE — 63600175 PHARM REV CODE 636 W HCPCS: Mod: HCNC | Performed by: HOSPITALIST

## 2025-05-02 PROCEDURE — 25000003 PHARM REV CODE 250: Mod: HCNC | Performed by: STUDENT IN AN ORGANIZED HEALTH CARE EDUCATION/TRAINING PROGRAM

## 2025-05-02 PROCEDURE — 25000003 PHARM REV CODE 250: Mod: HCNC | Performed by: HOSPITALIST

## 2025-05-02 PROCEDURE — 85025 COMPLETE CBC W/AUTO DIFF WBC: CPT | Mod: HCNC | Performed by: STUDENT IN AN ORGANIZED HEALTH CARE EDUCATION/TRAINING PROGRAM

## 2025-05-02 PROCEDURE — 92610 EVALUATE SWALLOWING FUNCTION: CPT | Mod: HCNC

## 2025-05-02 PROCEDURE — 36415 COLL VENOUS BLD VENIPUNCTURE: CPT | Mod: HCNC | Performed by: STUDENT IN AN ORGANIZED HEALTH CARE EDUCATION/TRAINING PROGRAM

## 2025-05-02 PROCEDURE — 99223 1ST HOSP IP/OBS HIGH 75: CPT | Mod: HCNC,,, | Performed by: INTERNAL MEDICINE

## 2025-05-02 PROCEDURE — 94761 N-INVAS EAR/PLS OXIMETRY MLT: CPT | Mod: HCNC

## 2025-05-02 PROCEDURE — 87449 NOS EACH ORGANISM AG IA: CPT | Mod: HCNC | Performed by: STUDENT IN AN ORGANIZED HEALTH CARE EDUCATION/TRAINING PROGRAM

## 2025-05-02 PROCEDURE — 97535 SELF CARE MNGMENT TRAINING: CPT | Mod: HCNC

## 2025-05-02 PROCEDURE — 83735 ASSAY OF MAGNESIUM: CPT | Mod: HCNC | Performed by: STUDENT IN AN ORGANIZED HEALTH CARE EDUCATION/TRAINING PROGRAM

## 2025-05-02 PROCEDURE — 80202 ASSAY OF VANCOMYCIN: CPT | Mod: HCNC | Performed by: HOSPITALIST

## 2025-05-02 PROCEDURE — 99232 SBSQ HOSP IP/OBS MODERATE 35: CPT | Mod: HCNC,GC,, | Performed by: INTERNAL MEDICINE

## 2025-05-02 PROCEDURE — 0202U NFCT DS 22 TRGT SARS-COV-2: CPT | Mod: HCNC | Performed by: STUDENT IN AN ORGANIZED HEALTH CARE EDUCATION/TRAINING PROGRAM

## 2025-05-02 PROCEDURE — 63600175 PHARM REV CODE 636 W HCPCS: Mod: HCNC | Performed by: INTERNAL MEDICINE

## 2025-05-02 RX ORDER — DOXAZOSIN 4 MG/1
8 TABLET ORAL DAILY
Status: DISCONTINUED | OUTPATIENT
Start: 2025-05-02 | End: 2025-05-08 | Stop reason: HOSPADM

## 2025-05-02 RX ORDER — POTASSIUM CHLORIDE 20 MEQ/1
20 TABLET, EXTENDED RELEASE ORAL ONCE
Status: COMPLETED | OUTPATIENT
Start: 2025-05-02 | End: 2025-05-02

## 2025-05-02 RX ORDER — TORSEMIDE 20 MG/1
40 TABLET ORAL DAILY
Status: DISCONTINUED | OUTPATIENT
Start: 2025-05-02 | End: 2025-05-07

## 2025-05-02 RX ORDER — ACETAMINOPHEN 500 MG
1000 TABLET ORAL EVERY 12 HOURS
Status: DISCONTINUED | OUTPATIENT
Start: 2025-05-02 | End: 2025-05-08 | Stop reason: HOSPADM

## 2025-05-02 RX ORDER — CEFEPIME HYDROCHLORIDE 2 G/1
2 INJECTION, POWDER, FOR SOLUTION INTRAVENOUS
Status: DISCONTINUED | OUTPATIENT
Start: 2025-05-02 | End: 2025-05-05

## 2025-05-02 RX ORDER — NITROGLYCERIN 20 MG/G
2 OINTMENT TOPICAL EVERY 8 HOURS
Status: COMPLETED | OUTPATIENT
Start: 2025-05-02 | End: 2025-05-02

## 2025-05-02 RX ORDER — TORSEMIDE 20 MG/1
20 TABLET ORAL DAILY
Status: DISCONTINUED | OUTPATIENT
Start: 2025-05-02 | End: 2025-05-02

## 2025-05-02 RX ADMIN — RIVAROXABAN 15 MG: 15 TABLET, FILM COATED ORAL at 04:05

## 2025-05-02 RX ADMIN — EMPAGLIFLOZIN 10 MG: 10 TABLET, FILM COATED ORAL at 08:05

## 2025-05-02 RX ADMIN — ACETAMINOPHEN 1000 MG: 500 TABLET ORAL at 08:05

## 2025-05-02 RX ADMIN — NITROGLYCERIN 2 INCH: 20 OINTMENT TOPICAL at 01:05

## 2025-05-02 RX ADMIN — VALSARTAN 320 MG: 160 TABLET, FILM COATED ORAL at 08:05

## 2025-05-02 RX ADMIN — HYDRALAZINE HYDROCHLORIDE 15 MG: 20 INJECTION, SOLUTION INTRAMUSCULAR; INTRAVENOUS at 12:05

## 2025-05-02 RX ADMIN — HYDROCHLOROTHIAZIDE 12.5 MG: 12.5 TABLET ORAL at 08:05

## 2025-05-02 RX ADMIN — DOXAZOSIN 8 MG: 4 TABLET ORAL at 08:05

## 2025-05-02 RX ADMIN — HYDRALAZINE HYDROCHLORIDE 15 MG: 20 INJECTION, SOLUTION INTRAMUSCULAR; INTRAVENOUS at 08:05

## 2025-05-02 RX ADMIN — HYDRALAZINE HYDROCHLORIDE 100 MG: 50 TABLET ORAL at 08:05

## 2025-05-02 RX ADMIN — TACROLIMUS 1 MG: 1 CAPSULE ORAL at 05:05

## 2025-05-02 RX ADMIN — ACETAMINOPHEN 1000 MG: 500 TABLET ORAL at 01:05

## 2025-05-02 RX ADMIN — TACROLIMUS 1 MG: 1 CAPSULE ORAL at 08:05

## 2025-05-02 RX ADMIN — NITROGLYCERIN 2 INCH: 20 OINTMENT TOPICAL at 06:05

## 2025-05-02 RX ADMIN — HYDRALAZINE HYDROCHLORIDE 100 MG: 50 TABLET ORAL at 01:05

## 2025-05-02 RX ADMIN — CEFEPIME 2 G: 2 INJECTION, POWDER, FOR SOLUTION INTRAVENOUS at 10:05

## 2025-05-02 RX ADMIN — POTASSIUM CHLORIDE 20 MEQ: 1500 TABLET, EXTENDED RELEASE ORAL at 08:05

## 2025-05-02 RX ADMIN — GABAPENTIN 300 MG: 300 CAPSULE ORAL at 08:05

## 2025-05-02 RX ADMIN — ATORVASTATIN CALCIUM 80 MG: 40 TABLET, FILM COATED ORAL at 08:05

## 2025-05-02 RX ADMIN — PREDNISONE 5 MG: 5 TABLET ORAL at 08:05

## 2025-05-02 RX ADMIN — TORSEMIDE 40 MG: 20 TABLET ORAL at 01:05

## 2025-05-02 RX ADMIN — NITROGLYCERIN 2 INCH: 20 OINTMENT TOPICAL at 02:05

## 2025-05-02 RX ADMIN — GABAPENTIN 300 MG: 300 CAPSULE ORAL at 01:05

## 2025-05-02 RX ADMIN — Medication 1 CAPSULE: at 08:05

## 2025-05-02 RX ADMIN — HYDRALAZINE HYDROCHLORIDE 100 MG: 50 TABLET ORAL at 06:05

## 2025-05-02 NOTE — ASSESSMENT & PLAN NOTE
"Patient has Diastolic (HFpEF) heart failure that is Chronic. On presentation their CHF was well compensated. Most recent BNP and echo results are listed below.  No results for input(s): "BNP" in the last 72 hours.  Latest ECHO  Results for orders placed during the hospital encounter of 02/20/25    Echo    Interpretation Summary    Left Ventricle: There is mild concentric hypertrophy. There is low normal systolic function with a visually estimated ejection fraction of 50 - 55%. Grade III diastolic dysfunction.    Left Ventricle: The left ventricle is at the upper limits of normal in size. Mildly increased wall thickness. There is mild concentric hypertrophy. Regional wall motion abnormalities present. See diagram for wall motion findings. There is low normal systolic function with a visually estimated ejection fraction of 50 - 55%. Ejection fraction is approximately 53%. Grade III diastolic dysfunction.    Right Ventricle: Systolic function is borderline low despite the low TAPSE.    Left Atrium: Left atrium is moderately dilated.    Aortic Valve: The aortic valve is a trileaflet valve. There is mild aortic valve sclerosis. There is moderate annular calcification present. There is mild aortic regurgitation with a centrally directed jet.    Mitral Valve: There is mild regurgitation with a centrally directed jet.    Pericardium: There is a trivial posterior effusion and under the RA.    Tricuspid Valve: There is mild regurgitation.    Pulmonary Artery: The estimated pulmonary artery systolic pressure is 30 mmHg.    Current Heart Failure Medications  hydrALAZINE tablet 100 mg, Every 8 hours, Oral  valsartan tablet 320 mg, Daily, Oral  empagliflozin (Jardiance) tablet 10 mg, Daily, Oral  hydrALAZINE injection 15 mg, Every 6 hours PRN, Intravenous  torsemide tablet 40 mg, Daily, Oral    Plan  - Monitor strict I&Os and daily weights.    - Place on telemetry  - Low sodium diet  - Cardiology has not been consulted  - The " patient's volume status is at their baseline

## 2025-05-02 NOTE — ASSESSMENT & PLAN NOTE
Patient's blood pressure range in the last 24 hours was: BP  Min: 164/80  Max: 194/106.The patient's inpatient anti-hypertensive regimen is listed below:  Current Antihypertensives  hydrALAZINE tablet 100 mg, Every 8 hours, Oral  valsartan tablet 320 mg, Daily, Oral  hydrALAZINE injection 15 mg, Every 6 hours PRN, Intravenous  nitroGLYCERIN 2% TD oint ointment 2 inch, Every 8 hours, Topical (Top)  doxazosin tablet 8 mg, Daily, Oral  torsemide tablet 40 mg, Daily, Oral    Plan  - BP is controlled, no changes needed to their regimen

## 2025-05-02 NOTE — ASSESSMENT & PLAN NOTE
5/2 CT chest -Bilateral multilobar consolidations and ground-glass opacities associated with bronchial thickening suggestive of infectious/inflammatory process. Right moderate and left small pleural effusions.  ID consulted for recurrent pneumonia/ immunosuppressed/ hemoptysis.

## 2025-05-02 NOTE — PLAN OF CARE
Problem: Adult Inpatient Plan of Care  Goal: Plan of Care Review  Outcome: Progressing  Goal: Patient-Specific Goal (Individualized)  Outcome: Progressing  Goal: Absence of Hospital-Acquired Illness or Injury  Outcome: Progressing  Goal: Optimal Comfort and Wellbeing  Outcome: Progressing  Goal: Readiness for Transition of Care  Outcome: Progressing     Problem: Infection  Goal: Absence of Infection Signs and Symptoms  Outcome: Progressing     Problem: Acute Kidney Injury/Impairment  Goal: Fluid and Electrolyte Balance  Outcome: Progressing  Goal: Improved Oral Intake  Outcome: Progressing  Goal: Effective Renal Function  Outcome: Progressing     Problem: Pneumonia  Goal: Fluid Balance  Outcome: Progressing  Goal: Resolution of Infection Signs and Symptoms  Outcome: Progressing  Goal: Effective Oxygenation and Ventilation  Outcome: Progressing     Problem: Diabetes Comorbidity  Goal: Blood Glucose Level Within Targeted Range  Outcome: Progressing     Problem: Fall Injury Risk  Goal: Absence of Fall and Fall-Related Injury  Outcome: Progressing       Pt AAO x 4. Patient rested comfortably with no significant changes during the shift, remains free from falls and injuries. Side rails up x2, bed low and locked, call light and personal belongings within reach. VS remain stable and respirations even and unlabored. Hourly rounding to ensure safety and comfort.  No grimace, cries or other signs of acute distress. Questions and concerns addressed and answered. Medication compliance. Pt remains on continuous cardiac monitoring. Family remains at bedside. HOB and pillows adjusted for comfort. Reviewed importance of safety barriers and calling for assistance prn. Verbalized understanding and states he will call prn for needs. Plan of care discussed.

## 2025-05-02 NOTE — PLAN OF CARE
FEES completed at bedside. Recommend regular diet/thin liquids. No further ST warranted.   5/2/2025

## 2025-05-02 NOTE — PT/OT/SLP EVAL
Speech Language Pathology  [facility]  Fiberoptic Endoscopic Evaluation of Swallowing (FEES)  Discharge    Patient Name:  Ravi Zamorano   MRN:  [id number]    Impression:     The patient tolerated the procedure and the equipment was removed. Findings were consistent with the following swallow diagnoses following swallow diagnosis and severity: Functional oral and pharyngeal phases    Dysphagia is further characterized by intact swallow function without evidence of penetration or aspiration with all consistencies presented.      Recommendations:       General Recommendations: Follow-up not indicated  Diet Recommendations:  Regular Diet - IDDSI Level 7, Thin liquids - IDDSI Level 0   Medications: Whole with liquid wash   Aspiration Precautions: Standard aspiration precautions  General Precautions: Standard, fall  Communication Strategies: none    Referral:     Reason for Referral  Patient was referred for a Fiberoptic Endoscopic Evaluation of a Swallow (FEES) to assess the efficiency of swallow function, rule out aspiration and make recommendations regarding safe dietary consistencies, effective compensatory strategies, and safe eating environment. Please refer to initial assessments and H&P for detailed/pertinent past medical history.    Diagnosis: Pneumonia     History:           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    CKD (chronic kidney disease) stage 4, GFR 15-29 ml/min 07/29/2014    Colon polyps 2014    Depression - situational 07/29/2014    Diabetes type 2 since 2000 07/29/2014    Diabetic neuropathy 07/29/2014    Encounter for blood transfusion     History of cardioversion 10/03/2019    History of hepatitis C, s/p successful Rx w/ SVR24 - 9/2017 07/29/2014    Genotype 1a, treatment naive 10/2014 liver biopsy - grade 1 /  stage 1 Completed Harvoni w/ SVR    Hyperlipidemia 07/29/2014    Hypertension     Neuropathy     Organ transplant candidate 07/29/2014    Pancreatitis     S/P cadaveric kidney transplant 11/5/2016. ESRD secondary to HTN/DMII 11/05/2016    Type 2 diabetes mellitus with stage 3a chronic kidney disease, with long-term current use of insulin 07/29/2014       Subjective:     General Appearance:       Behavior/State: awake and cooperative     Feeding tube present: No      Tracheostomy present: No     Respiratory Status: Room air    Pain: Pain Rating 1: 0/10  Pain Rating Post-Intervention 1: 0/10    Additional Information: in bed and all lines intact    Objective:     Ravi was seen today for a fiberoptic endoscopic evaluation of swallowing (FEES). The patient was positioned upright in bed.    The patient's name and medical record number were verified via verbal consent and/or visualization of wristband. The purpose, procedure, and risks of FEES were explained to the patient. The patient expressed an understanding of potential risks and verbally consented to the procedure. Patient without known allergy to foods or synthetic food dyes offered during today's assessment.    Flexible Fiberoptic Endoscope was advanced transnasally through the most patent nasal cavity to the level of the velopharynx and larynx. A video of the examination was recorded.    Oral Mechanism Examination  Oral Musculature: WFL  Dentition: present and adequate  Secretion Management: adequate  Oral Labial Strength and Mobility: WFL  Lingual Strength and Mobility: WFL  Volitional Cough: adequate  Volitional Swallow: timely  Voice Prior to PO Intake: clear    Scope Utilized: Pentax    Nares Entry: right    Visualization of Anatomy:      Velopharynx: Structure and function of the velum, lateral pharyngeal walls, and posterior pharyngeal wall appeared WFL   Epiglottis: Intact   Arytenoids: symmetric mobility appreciated   True vocal folds: Intact and  symmetric mobility appreciated   Secretion Management: adequate   Glottis: adequate airway patency at the level of the glottis      Oral phase:  The oral phase cannot be directly observed. The following behaviors are determined by the manner in which the bolus enters the pharynx.     WFL- Pt with adequate bolus acceptance, containment, control and timely A-P transfer across consistencies     Pharyngeal Phase:     Consistency Method/Volume Observation Compensatory Strategy   Thin Straw Essentially timely swallow initiation , Adequate airway protection  , No penetration observed , and No aspiration observed  none   Solid Spoon diced peaches Essentially timely swallow initiation , Adequate airway protection  , No penetration observed , and No aspiration observed    none       During/Post-Procedure Respiratory Status: Room air    Additional Information: in bed      Vocal fold adduction      Vocal fold abduction    Prognosis:     Excellent    Prognostic Barriers: none    Education:     Results reviewed with: patient    Additional education provided regarding: recommendations and swallow anatomy and physiology    Plan:     No additional follow up warranted within acute care setting     Time Tracking:     SLP Treatment Date: 05/02/25  Speech Start Time: 1105  Speech Stop Time: 1145     Speech Total Time (min): 40 min    Billable Minutes: FEES Billable Minutes: Endoscopy Swallow Test 10 and Self Care/Home Management Training 10    05/02/2025

## 2025-05-02 NOTE — PT/OT/SLP EVAL
Speech Language Pathology Evaluation  Bedside Swallow    Patient Name:  Ravi Zamorano   MRN:  [id number]  Admitting Diagnosis: Pneumonia    Recommendations:                 General Recommendations:  objective swallow assessment via FEES  Diet recommendations:  Regular Diet - IDDSI Level 7, Thin liquids - IDDSI Level 0   Aspiration Precautions: Standard aspiration precautions   General Precautions: Standard, fall  Communication strategies:  none    Assessment:     Ravi Zamorano is a 69 y.o. male with an SLP diagnosis of appearing  intact oral feeding and swallowing skills but with recurrent respiratory illnesses warranting additional rule out of dysphagia .      History:     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    CKD (chronic kidney disease) stage 4, GFR 15-29 ml/min 07/29/2014    Colon polyps 2014    Depression - situational 07/29/2014    Diabetes type 2 since 2000 07/29/2014    Diabetic neuropathy 07/29/2014    Encounter for blood transfusion     History of cardioversion 10/03/2019    History of hepatitis C, s/p successful Rx w/ SVR24 - 9/2017 07/29/2014    Genotype 1a, treatment naive 10/2014 liver biopsy - grade 1 / stage 1 Completed Harvoni w/ SVR    Hyperlipidemia 07/29/2014    Hypertension     Neuropathy     Organ transplant candidate 07/29/2014    Pancreatitis     S/P cadaveric kidney transplant 11/5/2016. ESRD secondary to HTN/DMII 11/05/2016    Type 2 diabetes mellitus with stage 3a chronic kidney disease, with long-term current use of insulin 07/29/2014       Past Surgical History:   Procedure Laterality Date    ABLATION OF ARRHYTHMOGENIC FOCUS FOR ATRIAL FIBRILLATION N/A 6/11/2024    Procedure: Ablation atrial fibrillation;  Surgeon: Bronson Bowden MD;  Location: [facility];  Service: Cardiology;  Laterality: N/A;  AF, FELICIANO (cx if  SR), PVI, RFA, Carto, Gen, GP, 3 Prep    ABLATION, ATRIAL FLUTTER, TYPICAL  6/11/2024    Procedure: Ablation, Atrial Flutter, Typical;  Surgeon: Bronson Bowden MD;  Location: [facility] EP LAB;  Service: Cardiology;;    APPENDECTOMY      BONE MARROW BIOPSY Left 6/26/2024    Procedure: Biopsy-bone marrow;  Surgeon: Bridger Zapata MD;  Location: [facility] ENDO (4TH FLR);  Service: Oncology;  Laterality: Left;  6/24-pt knows to hold aspirin and eliquis as instructed by hem/onc, precall complete-Kpvt    CARDIOVERSION  6/11/2024    Procedure: Cardioversion;  Surgeon: Bronson Bowden MD;  Location: [facility] EP LAB;  Service: Cardiology;;    CATARACT EXTRACTION W/  INTRAOCULAR LENS IMPLANT Right 3/13/2024    Procedure: EXTRACTION, CATARACT, WITH IOL INSERTION;  Surgeon: Jennifer Palacio MD;  Location: [facility] OR;  Service: Ophthalmology;  Laterality: Right;    CATARACT EXTRACTION W/  INTRAOCULAR LENS IMPLANT Left 4/10/2024    Procedure: EXTRACTION, CATARACT, WITH IOL INSERTION;  Surgeon: Jennifer Palacio MD;  Location: [facility] OR;  Service: Ophthalmology;  Laterality: Left;    COLONOSCOPY N/A 12/21/2020    Procedure: COLONOSCOPY;  Surgeon: Tico Bell MD;  Location: [facility] ENDO (4TH FLR);  Service: Endoscopy;  Laterality: N/A;  ok to hold eliquis per Dr. Valadez, see telephone encounter 11/13/2020-MS  covid test 12/18 [place]    ECHOCARDIOGRAM,TRANSESOPHAGEAL N/A 2/3/2025    Procedure: Transesophageal echo (FELICIANO) intra-procedure log documentation;  Surgeon: Grayson Lin III, MD;  Location: [facility] EP LAB;  Service: Cardiology;  Laterality: N/A;    KIDNEY TRANSPLANT      TRANSESOPHAGEAL ECHOCARDIOGRAPHY N/A 8/26/2019    Procedure: ECHOCARDIOGRAM, TRANSESOPHAGEAL;  Surgeon: [facility] Diagnostic Provider;  Location: [facility] EP LAB;  Service: Cardiology;  Laterality: N/A;    TRANSESOPHAGEAL ECHOCARDIOGRAPHY N/A 6/11/2024    Procedure: ECHOCARDIOGRAM, TRANSESOPHAGEAL;  Surgeon: Grayson Lin III, MD;  Location: [facility] EP LAB;  Service:  Cardiology;  Laterality: N/A;    TREATMENT OF CARDIAC ARRHYTHMIA N/A 8/26/2019    Procedure: CARDIOVERSION;  Surgeon: Bronson Bowden MD;  Location: [facility] EP LAB;  Service: Cardiology;  Laterality: N/A;  AF, FELICIANO, DCCV, MAC, GP, 3 PREP    TREATMENT OF CARDIAC ARRHYTHMIA N/A 2/3/2025    Procedure: Cardioversion or Defibrillation;  Surgeon: Bronson Bowden MD;  Location: [facility] EP LAB;  Service: Cardiology;  Laterality: N/A;  AF, FELICIANO, DCCV, MAC, GP, 3 Prep   HPI:  This is a 69-year-old male with a history of DM, renal transplant 2016, CHFpEF, AF/AFL (PVI/PWI/CTI 6/2024), CVA, recurrent PNA who presents with fevers, chills.  States that symptoms are similar to last month when he was admitted for pneumonia.  Was in his usual state of health until last night when he started experiencing intermittent fevers, chills.  Wife states she also noticed that he has had increased intermittent cough over the past day or so.  Patient did not notice this.  Denies any production.  Also denies any shortness for breath, wheezing, chest pain, abdominal pain, diarrhea, nausea, vomiting, dysuria.  Compliant with all medications.  Trace lower extremity swelling is chronic.    ED course:  On room air.  Lab work significant for Cr 2.7, same as previous. CXR showed development of RLL consolidation.  Received IV vancomycin, azithromycin, Zosyn.  Septic workup obtained.     Overview/Hospital Course:  Patient admitted due to fever, fatigue and chills at home. He has not had any cough or sob at home. He was recently treated for PNA at the end of march and was feeling a lot better since then, until he had a sudden onset of symptoms. He did describe some productive cough for several morning and the mucus he coughed op had rust colored blood as well as bright red blood sometimes. Theses episodes only happened shortly after wakening in the morning and didn't persist during the day, no associated sob or other symptoms during those times. Denies nasal  drainage, congestion, or nose bleeds.         Started on broad spectrum and feeling a lot better. Still on RA and no cough, wheezes or sob      CT chest ordered to get a better look at the consolidations and possible hemoptysis, will considered pulmonology and/or ID consulted to help direct care     5/1  PMH of DM, renal transplant 2016, CHFpEF, AF/AFL (PVI/PWI/CTI 6/2024), CVA, recurrent PNA who presents with fevers, chills.  States that symptoms are similar to last month when he was admitted for pneumonia. No other pna symptoms despite CXR changes.BC x2 4/29 NGTD. vancomycins discontinued.  respiratory cultures pending.  CT chest ordered -L GGO concerning for pneumonia possible hiatal hernia and possible anterior vertebral osteophyte near mid esophagus.  SLP consulted. MBSS and barium esophogram to assess for hiatal hernia (second episode of pneumonia since March 2025) and recurrent aspiration as a cause of repeat pneumonia. continue cefepime. completed azithromycin. denies hemoptysis this admission. last episode 2 weeks back. resumed prograf 1mg BID. continue prednisone. hold cellcept for now   5/2 s/p FEES. SLP recs regular diet/thin liquids CT chest -Bilateral multilobar consolidations and ground-glass opacities associated with bronchial thickening suggestive of infectious/inflammatory process. Right moderate and left small pleural effusions.  ID consulted for recurrent pneumonia/ immunosuppressed/ hemoptysis.  SBP 160s-180s. coreg previously due to bradycardia during hospitalization 2/202. discontinued Spironolactone 25 mg daily -  3/2024 due to getting LH and dizzy . procardia listed as allergy. Doxazosin increased to 8mg daiy. . HCTZ discontinued. started on torsemide 40mg daily       Modified Barium Swallow: none prior    Chest X-Rays:   4/29  FINDINGS:  Heart is enlarged, but allowing for magnification of the cardiomediastinal silhouette related both to projection and to a poorer inspiratory depth level  than on the prior exam, I doubt that the heart has changed appreciably in size since that time.  However, there has been a detrimental interval change in the appearance of the chest, as the current examination demonstrates pulmonary parenchymal opacity in the right lower lung zone consistent with the development of airspace consolidation since the prior exam.  Given the patient's clinical history, this may represent acute pneumonia.  The left lung and the remainder of the right lung are clear.  No pleural fluid of any substantial volume is seen on either side.  No pneumothorax.     Impression:     Significant detrimental interval change in the appearance of the chest since 04/22/2025 is observed, relating to the development of airspace consolidation in the right lower lung zone.  Findings must be clinically correlated, but certainly may represent acute pneumonia given the patient's clinical history.  Continued demonstration of modest cardiomegaly.     Prior diet: Seen 4 months previously recommending regular solids and thin liquids       Subjective     Pt awake and alert     Pain/Comfort:  Pain Rating 1: 0/10  Pain Rating Post-Intervention 1: 0/10    Respiratory Status: Room air    Objective:     Oral Musculature Evaluation  Oral Musculature: WFL  Dentition: present and adequate  Secretion Management: adequate  Oral Labial Strength and Mobility: WFL  Lingual Strength and Mobility: WFL  Velar Elevation: WFL  Volitional Cough: adequate  Volitional Swallow: timely  Voice Prior to PO Intake: clear    Bedside Swallow Eval:   Pt observed to consume thin liquids no overt clinical signs of aspiration appreciated. SLP discussed with pt team requesting more objective swallow assessment to rule out contributing factor for  recurrent respiratory illnesses .  SLP discussed with pt FEES purpose and procedure. Pt ultimately in agreement with plan.     Treatment:  Education provided to Pt re: SLP role in acute care setting, overall  impressions and therapeutic goals. Whiteboard updated.      Goals:   Multidisciplinary Problems       SLP Goals       Not on file                Pt will participate objective swallow assessment     Plan:     Patient to be seen:      Plan of Care expires:     Plan of Care reviewed with:  patient   SLP Follow-Up:  No       Discharge recommendations:      Barriers to Discharge:  None    Time Tracking:     SLP Treatment Date:   05/02/25  Speech Start Time:  1040  Speech Stop Time:  1048     Speech Total Time (min):  8 min    Billable Minutes: Eval Swallow and Oral Function 8    05/02/2025

## 2025-05-02 NOTE — CONSULTS
"[facility]  Infectious Disease  Consult Note    Patient Name: Ravi Zamorano  MRN: [id number]  Admission Date: 4/29/2025  Hospital Length of Stay: 3 days  Attending Physician: Cliff Guaman MD  Primary Care Provider: Mima Mack MD     Isolation Status: No active isolations    Patient information was obtained from patient and ER records.      Inpatient consult to Infectious Diseases  Consult performed by: Liberty Olson DO  Consult ordered by: Cliff Guaman MD        Assessment/Plan:     Pulmonary  * Pneumonia  69M with PMH of kidney transplant 2016, presents with fever, chills, cough, has LINDSAY multilobar consolidations and GGO and mod R effusion on CT chest. He has been afebrile, on room air, no leukocytosis. 4/29 BCX NG. RCX and MRSA nares sent. Currently on cefepime.     Recommendations  Obtain resp infection panel and urine legionella (ordered)  Follow up MRSA nares, resp culture, blood cultures  Continue IV cefepime 2g q12h        Thank you for your consult. I will follow-up with patient. Please contact us if you have any additional questions.    Liberty Olson DO  Infectious Disease  [facility]    Subjective:     Principal Problem: Pneumonia    HPI: Mr. Zamorano is a 69M with PMH of kidney transplant 2016, DM2, HFpEF, afib/flutter, previous CVA, presents with fever, chills, cough. Infectious disease consulted for "recurrent pneumonia/ immunosuppressed/ hemoptysis".     He has been afebrile, on room air, no leukocytosis. 4/29 BCX NG. RCX and MRSA nares sent. CT chest showing LINDSAY multilobar consolidations and GGOs, and mod R effusion. Currently on cefepime.     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    CKD (chronic kidney disease) stage 4, GFR 15-29 ml/min 07/29/2014    Colon polyps 2014    Depression - " situational 07/29/2014    Diabetes type 2 since 2000 07/29/2014    Diabetic neuropathy 07/29/2014    Encounter for blood transfusion     History of cardioversion 10/03/2019    History of hepatitis C, s/p successful Rx w/ SVR24 - 9/2017 07/29/2014    Genotype 1a, treatment naive 10/2014 liver biopsy - grade 1 / stage 1 Completed Harvoni w/ SVR    Hyperlipidemia 07/29/2014    Hypertension     Neuropathy     Organ transplant candidate 07/29/2014    Pancreatitis     S/P cadaveric kidney transplant 11/5/2016. ESRD secondary to HTN/DMII 11/05/2016    Type 2 diabetes mellitus with stage 3a chronic kidney disease, with long-term current use of insulin 07/29/2014       Past Surgical History:   Procedure Laterality Date    ABLATION OF ARRHYTHMOGENIC FOCUS FOR ATRIAL FIBRILLATION N/A 6/11/2024    Procedure: Ablation atrial fibrillation;  Surgeon: Bronson Bowden MD;  Location: [facility] EP LAB;  Service: Cardiology;  Laterality: N/A;  AF, FELICIANO (cx if SR), PVI, RFA, Carto, Gen, GP, 3 Prep    ABLATION, ATRIAL FLUTTER, TYPICAL  6/11/2024    Procedure: Ablation, Atrial Flutter, Typical;  Surgeon: Bronson Bowden MD;  Location: [facility] EP LAB;  Service: Cardiology;;    APPENDECTOMY      BONE MARROW BIOPSY Left 6/26/2024    Procedure: Biopsy-bone marrow;  Surgeon: Bridger Zapata MD;  Location: [facility] ENDO ([address] [place] [address]);  Service: Oncology;  Laterality: Left;  6/24-pt knows to hold aspirin and eliquis as instructed by hem/onc, precall complete-Kpvt    CARDIOVERSION  6/11/2024    Procedure: Cardioversion;  Surgeon: Bronson Bowden MD;  Location: [facility] EP LAB;  Service: Cardiology;;    CATARACT EXTRACTION W/  INTRAOCULAR LENS IMPLANT Right 3/13/2024    Procedure: EXTRACTION, CATARACT, WITH IOL INSERTION;  Surgeon: Jennifer Palacio MD;  Location: [facility] OR;  Service: Ophthalmology;  Laterality: Right;    CATARACT EXTRACTION W/  INTRAOCULAR LENS IMPLANT Left 4/10/2024    Procedure: EXTRACTION, CATARACT, WITH IOL INSERTION;  Surgeon: Pia  Jennifer YE MD;  Location: [facility] OR;  Service: Ophthalmology;  Laterality: Left;    COLONOSCOPY N/A 12/21/2020    Procedure: COLONOSCOPY;  Surgeon: Tico Bell MD;  [facility]  Service: Endoscopy;  Laterality: N/A;  ok to hold eliquis per Dr. Valadez, see telephone encounter 11/13/2020-MS  covid test 12/18 [place]    ECHOCARDIOGRAM,TRANSESOPHAGEAL N/A 2/3/2025    Procedure: Transesophageal echo (FEILCIANO) intra-procedure log documentation;  Surgeon: Grayson Lin III, MD;  Location: [facility] EP LAB;  Service: Cardiology;  Laterality: N/A;    KIDNEY TRANSPLANT      TRANSESOPHAGEAL ECHOCARDIOGRAPHY N/A 8/26/2019    Procedure: ECHOCARDIOGRAM, TRANSESOPHAGEAL;  Surgeon: [facility] Provider;  Location: [facility] EP LAB;  Service: Cardiology;  Laterality: N/A;    TRANSESOPHAGEAL ECHOCARDIOGRAPHY N/A 6/11/2024    Procedure: ECHOCARDIOGRAM, TRANSESOPHAGEAL;  Surgeon: Grayson Lin III, MD;  Location: [facility] EP LAB;  Service: Cardiology;  Laterality: N/A;    TREATMENT OF CARDIAC ARRHYTHMIA N/A 8/26/2019    Procedure: CARDIOVERSION;  Surgeon: Bronson Bowden MD;  Location: [facility] EP LAB;  Service: Cardiology;  Laterality: N/A;  AF, FELICIANO, DCCV, MAC, GP, 3 PREP    TREATMENT OF CARDIAC ARRHYTHMIA N/A 2/3/2025    Procedure: Cardioversion or Defibrillation;  Surgeon: Bronson Bowden MD;  Location: [facility] EP LAB;  Service: Cardiology;  Laterality: N/A;  AF, FELICIANO, DCCV, MAC, GP, 3 Prep       Review of patient's allergies indicates:   Allergen Reactions    Nifedipine Other (See Comments)     Gingival hyperplasia       Medications:  Facility-Administered Medications Prior to Admission   Medication    epoetin tanya injection 4,000 Units     Medications Prior to Admission   Medication Sig    atorvastatin (LIPITOR) 80 MG tablet Take 1 tablet (80 mg total) by mouth once daily.    blood sugar diagnostic Strp Use to check blood glucose 1 times daily, to use with insurance preferred meter    blood-glucose meter Misc Use to check  blood glucose 1 times daily, to use with insurance preferred meter    blood-glucose sensor (DEXCOM G7 SENSOR) Kayla Change sensor every 10 days.    doxazosin (CARDURA) 4 MG tablet Take 1 tablet (4 mg total) by mouth once daily.    empagliflozin (JARDIANCE) 10 mg tablet Take 1 tablet (10 mg total) by mouth once daily.    ergocalciferol (ERGOCALCIFEROL) 50,000 unit Cap Take 1 capsule (50,000 Units total) by mouth every 7 days.    furosemide (LASIX) 40 MG tablet Take 2 tablets (80 mg total) by mouth daily as needed (for swelling).    gabapentin (NEURONTIN) 300 MG capsule Take 1 capsule by mouth in the morning, 1 capsule midday, and 3 capsules at night.    hydrALAZINE (APRESOLINE) 100 MG tablet Take 1 tablet (100 mg total) by mouth every 8 (eight) hours.    hydroCHLOROthiazide 12.5 MG Tab Take 1 tablet (12.5 mg total) by mouth once daily.    insulin aspart U-100 (NOVOLOG U-100 INSULIN ASPART) 100 unit/mL injection Use in pump, max daily 100 units.    insulin aspart U-100 (NOVOLOG) 100 unit/mL (3 mL) InPn pen Inject 10 units under the skin w/ breakfast, 7 units at lunch and dinner. Scale 180-230+2, 231-280+4, 281-330+6, 331-380+8, >380+10.  Max daily 57 units. (Patient not taking: Reported on 4/22/2025)    insulin glargine U-100, Lantus, (LANTUS SOLOSTAR U-100 INSULIN) 100 unit/mL (3 mL) InPn pen Inject 20 Units into the skin every morning. (Patient not taking: Reported on 4/22/2025)    insulin  cart,aut,G6/7,cntr (OMNIPOD 5 G6-G7 INTRO KT,GEN5,) Crtg Use as directed.    insulin pump cart,auto,BT,G6/7 (OMNIPOD 5 G6-G7 PODS, GEN 5,) Crtg Change every 48 hours.    Lactobacillus rhamnosus GG (CULTURELLE) 10 billion cell capsule Take 1 capsule by mouth once daily.    lancets 30 gauge Misc Use to check blood glucose 1 times daily, to use with insurance preferred meter    magnesium oxide (MAG-OX) 400 mg (241.3 mg magnesium) tablet Take 1 tablet (400 mg total) by mouth 2 (two) times daily.    meclizine (ANTIVERT) 25 mg tablet  "Take 1 tablet (25 mg total) by mouth 3 (three) times daily as needed for Dizziness.    [Paused] mycophenolate (CELLCEPT) 250 mg Cap Take 2 capsules (500 mg total) by mouth 2 (two) times daily.    pen needle, diabetic (BD ULTRA-FINE LO PEN NEEDLE) 32 gauge x 5/32" Ndle USE TO ADMINISTER INSULIN 4 TIMES DAILY.    polyethylene glycol (MIRALAX) 17 gram PwPk Take 17 gm (mixed in liquid) by mouth every day.    predniSONE (DELTASONE) 5 MG tablet Take 1 tablet (5 mg total) by mouth once daily.    rivaroxaban (XARELTO) 15 mg Tab Take 1 tablet (15 mg total) by mouth daily with dinner or evening meal.    tacrolimus (PROGRAF) 1 MG Cap Take 1 capsule (1 mg total) by mouth every 12 (twelve) hours.    valsartan (DIOVAN) 320 MG tablet Take 1 tablet (320 mg total) by mouth once daily.     Antibiotics (From admission, onward)      Start     Stop Route Frequency Ordered    05/02/25 1000  ceFEPIme injection 2 g         -- IV Every 12 hours (non-standard times) 05/02/25 0745    04/30/25 0900  azithromycin (ZITHROMAX) 500 mg in 0.9% NaCl 250 mL IVPB (admixture device)         05/01/25 1259 IV Every 24 hours (non-standard times) 04/29/25 1016          Antifungals (From admission, onward)      None          Antivirals (From admission, onward)      None             Immunization History   Administered Date(s) Administered    COVID-19, MRNA, LN-S, PF (Pfizer) (Purple Cap) 04/01/2021, 04/22/2021, 10/30/2021, 10/30/2021    COVID-19, mRNA, LNP-S, PF (Moderna) Ages 12+ 11/08/2023    COVID-19, mRNA, LNP-S, PF, naldo-sucrose, 30 mcg/0.3 mL (Pfizer Ages 12+) 09/19/2024    COVID-19, mRNA, LNP-S, bivalent booster, PF (PFIZER OMICRON) 01/04/2023    Hepatitis A, Adult 09/18/2014    Hepatitis B 09/18/2014, 10/21/2014    Hepatitis B, Adult 06/19/2000, 07/24/2000, 01/16/2001    Hepatitis B, Dialysis, 3 Dose 09/18/2014, 10/21/2014    Influenza 10/01/2017    Influenza (FLUAD) - Quadrivalent - Adjuvanted - PF *Preferred* (65+) 11/08/2023    Influenza - " Quadrivalent 2019, 2019    Influenza - Quadrivalent - PF *Preferred* (6 months and older) 09/15/2017, 2018, 2018, 2020, 2020, 10/10/2022    Influenza - Trivalent - Afluria, Fluzone MDV 10/01/2015, 2016, 2016, 10/09/2021    Influenza - Trivalent - Fluad - Adjuvanted - PF (65 years and older 2024    Influenza - Trivalent - Flucelvax - PF 08/10/2014, 08/10/2014    Influenza Split 10/09/2021    Pneumococcal Conjugate - 13 Valent 2014    Pneumococcal Conjugate - 20 Valent 2023    Pneumococcal Polysaccharide - 23 Valent 2014    RSVpreF (Arexvy) 2024    Tdap 2000, 10/21/2014, 09/15/2017    Zoster 2014    Zoster Recombinant 2018, 2018, 10/30/2019       Family History       Problem Relation (Age of Onset)    Cancer Brother    Diabetes Mother    Heart disease Mother, Father    Hypertension Mother, Brother          Social History     Socioeconomic History    Marital status:    Occupational History     Employer: [employer]   Tobacco Use    Smoking status: Former     Current packs/day: 0.00     Average packs/day: 0.5 packs/day for 32.0 years (16.0 ttl pk-yrs)     Types: Cigarettes     Start date: 1984     Quit date: 2016     Years since quittin.4    Smokeless tobacco: Never   Substance and Sexual Activity    Alcohol use: Yes     Comment: Pt reports occasional beers on Sundays. Pt reports drinking daily prior to ESRD diagnosis.    Drug use: No    Sexual activity: Yes     Partners: Female   Social History Narrative     for 14 years     for 38 years    2 children ages 41, 42     Social Drivers of Health     Financial Resource Strain: Low Risk  (2025)    Overall Financial Resource Strain (CARDIA)     Difficulty of Paying Living Expenses: Not hard at all   Food Insecurity: No Food Insecurity (2025)    Hunger Vital Sign     Worried About Running Out of Food in the Last Year:  Never true     Ran Out of Food in the Last Year: Never true   Transportation Needs: No Transportation Needs (5/1/2025)    PRAPARE - Transportation     Lack of Transportation (Medical): No     Lack of Transportation (Non-Medical): No   Physical Activity: Insufficiently Active (5/1/2025)    Exercise Vital Sign     Days of Exercise per Week: 7 days     Minutes of Exercise per Session: 10 min   Stress: No Stress Concern Present (5/1/2025)    [employer] [facility] [employer] - Occupational Stress Questionnaire     Feeling of Stress : Not at all   Housing Stability: Low Risk  (5/1/2025)    Housing Stability Vital Sign     Unable to Pay for Housing in the Last Year: No     Homeless in the Last Year: No     Review of Systems   Constitutional:  Negative for chills and fever.   Respiratory:  Positive for cough. Negative for shortness of breath.    Cardiovascular:  Negative for chest pain.   Gastrointestinal:  Negative for abdominal pain, constipation, diarrhea, nausea and vomiting.   Musculoskeletal:  Negative for arthralgias and myalgias.   Skin:  Negative for rash.   Neurological:  Negative for weakness and headaches.     Objective:     Vital Signs (Most Recent):  Temp: 98.1 °F (36.7 °C) (05/02/25 1109)  Pulse: 79 (05/02/25 1109)  Resp: 20 (05/02/25 1109)  BP: (!) 183/84 (05/02/25 1109)  SpO2: 97 % (05/02/25 1109) Vital Signs (24h Range):  Temp:  [97.7 °F (36.5 °C)-98.4 °F (36.9 °C)] 98.1 °F (36.7 °C)  Pulse:  [] 79  Resp:  [18-20] 20  SpO2:  [94 %-98 %] 97 %  BP: (164-194)/() 183/84     Weight: 90.7 kg (200 lb)  Body mass index is 26.39 kg/m².    Estimated Creatinine Clearance: 33.8 mL/min (A) (based on SCr of 2.4 mg/dL (H)).     Physical Exam  Vitals reviewed.   Constitutional:       General: He is not in acute distress.     Appearance: Normal appearance. He is not ill-appearing.   HENT:      Head: Normocephalic and atraumatic.   Eyes:      Extraocular Movements: Extraocular movements intact.       Conjunctiva/sclera: Conjunctivae normal.   Cardiovascular:      Rate and Rhythm: Normal rate and regular rhythm.      Heart sounds: No murmur heard.  Pulmonary:      Effort: Pulmonary effort is normal. No respiratory distress.      Breath sounds: Normal breath sounds.   Abdominal:      General: Abdomen is flat. Bowel sounds are normal.      Palpations: Abdomen is soft.      Tenderness: There is no abdominal tenderness.   Musculoskeletal:      Cervical back: Normal range of motion.   Skin:     General: Skin is warm and dry.   Neurological:      General: No focal deficit present.      Mental Status: He is alert and oriented to person, place, and time.   Psychiatric:         Mood and Affect: Mood normal.         Behavior: Behavior normal.         Thought Content: Thought content normal.          Significant Labs: All pertinent labs within the past 24 hours have been reviewed.  Recent Lab Results  (Last 5 results in the past 24 hours)        05/02/25  1139   05/02/25  0755   05/02/25  0215   05/01/25  2115   05/01/25  1626        Anion Gap     10           Baso #     0.02           Basophil %     0.4           BUN     48           Calcium     8.1           Chloride     104           CO2     21           Creatinine     2.4           eGFR     29  Comment: Estimated GFR calculated using the CKD-EPI creatinine (2021) equation.           Eos #     0.06           Eos %     1.1           Glucose     158           Gran # (ANC)     3.75           Hematocrit     29.0           Hemoglobin     8.5           Immature Grans (Abs)     0.02  Comment: Mild elevation in immature granulocytes is non specific and can be seen in a variety of conditions including stress response, acute inflammation, trauma and pregnancy. Correlation with other laboratory and clinical findings is essential.           Immature Granulocytes     0.4           Lymph #     1.00           Lymph %     17.5           Magnesium      2.3           MCH     22.7            MCHC     29.3           MCV     78           Mono #     0.86           Mono %     15.1           MPV       Comment: Result Not Available           Neut %     65.5           nRBC     0           Phosphorus Level     2.8           Platelet Count     107           POCT Glucose   139     168   117       Potassium     3.5           RBC     3.74           RDW     19.9           Sodium     135           Tacrolimus Lvl     4.3  Comment: Testing performed by a chemiluminescent microparticle   immunoassay on the [employer] i System.    CAUTION: No firm therapeutic range exists for tacrolimus in whole blood. The complexity of the clinical state, individual differences in sensitivity to immunosuppressive and nephrotoxic effects of tacrolimus, co-administration of other immunosuppressants, type of transplant, time post-transplant and a number of other factors contribute to different requirements for optimal blood levels of tacrolimus. Therefore, individual tacrolimus values cannot be used as the sole indicator for making changes in treatment regimen and each patient should be thoroughly evaluated clinically before changes in treatment regimens are made. Each user must establish his or her own ranges based on clinical experience.  Therapeutic ranges vary according to the commercial test used, and therefore should be established for each commercial test. Values obtained with different assay methods cannot be used interchangeably due to differences in assay methods and cross-reactivity with metabolites, nor should correction factors be applied. Therefore, consistent use of one assay for individual patients is recommended.             Vancomycin, Random 19.2               WBC     5.71                                  Significant Imaging: I have reviewed all pertinent imaging results/findings within the past 24 hours.

## 2025-05-02 NOTE — ASSESSMENT & PLAN NOTE
Patient has persistent (7 days or more) atrial fibrillation. Patient is currently in atrial fibrillation. [id number] Score: 3. The patients heart rate in the last 24 hours is as follows:  Pulse  Min: 77  Max: 106     Antiarrhythmics       Anticoagulants  rivaroxaban tablet 15 mg, With dinner, Oral    Plan  - Replete lytes with a goal of K>4, Mg >2  - Patient is anticoagulated, see medications listed above.  - Patient's afib is currently controlled

## 2025-05-02 NOTE — ASSESSMENT & PLAN NOTE
Creatine stable for now. BMP reviewed- noted Estimated Creatinine Clearance: 33.8 mL/min (A) (based on SCr of 2.4 mg/dL (H)). according to latest data. Based on current GFR, CKD stage is stage 3 - GFR 30-59.  Monitor UOP and serial BMP and adjust therapy as needed. Renally dose meds. Avoid nephrotoxic medications and procedures.    Recent Labs   Lab 04/30/25  0619 05/01/25  0233 05/01/25  1159 05/02/25  0215   BUN 46* 47*  --  48*   CREATININE 2.9* 2.6* 2.5* 2.4*       I & O     Intake/Output Summary (Last 24 hours) at 5/2/2025 1455  Last data filed at 5/2/2025 1138  Gross per 24 hour   Intake --   Output 200 ml   Net -200 ml

## 2025-05-02 NOTE — PROGRESS NOTES
[facility] Medicine  Progress Note    Patient Name: Ravi Zamorano  MRN: [id number]  Patient Class: IP- Inpatient   Admission Date: 4/29/2025  Length of Stay: 3 days  Attending Physician: Cliff Guaman MD  Primary Care Provider: Mima Mack MD        Subjective     Principal Problem:Pneumonia        HPI:  This is a 69-year-old male with a history of DM, renal transplant 2016, CHFpEF, AF/AFL (PVI/PWI/CTI 6/2024), CVA, recurrent PNA who presents with fevers, chills.  States that symptoms are similar to last month when he was admitted for pneumonia.  Was in his usual state of health until last night when he started experiencing intermittent fevers, chills.  Wife states she also noticed that he has had increased intermittent cough over the past day or so.  Patient did not notice this.  Denies any production.  Also denies any shortness for breath, wheezing, chest pain, abdominal pain, diarrhea, nausea, vomiting, dysuria.  Compliant with all medications.  Trace lower extremity swelling is chronic.    ED course:  On room air.  Lab work significant for Cr 2.7, same as previous. CXR showed development of RLL consolidation.  Received IV vancomycin, azithromycin, Zosyn.  Septic workup obtained.    Overview/Hospital Course:  Patient admitted due to fever, fatigue and chills at home. He has not had any cough or sob at home. He was recently treated for PNA at the end of march and was feeling a lot better since then, until he had a sudden onset of symptoms. He did describe some productive cough for several morning and the mucus he coughed op had rust colored blood as well as bright red blood sometimes. Theses episodes only happened shortly after wakening in the morning and didn't persist during the day, no associated sob or other symptoms during those times. Denies nasal drainage, congestion, or nose bleeds.       Started on broad spectrum and feeling a lot better. Still on RA and no cough,  wheezes or sob     CT chest ordered to get a better look at the consolidations and possible hemoptysis, will considered pulmonology and/or ID consulted to help direct care    5/1  PMH of DM, renal transplant 2016, CHFpEF, AF/AFL (PVI/PWI/CTI 6/2024), CVA, recurrent PNA who presents with fevers, chills.  States that symptoms are similar to last month when he was admitted for pneumonia. No other pna symptoms despite CXR changes.BC x2 4/29 NGTD. vancomycins discontinued.  respiratory cultures pending.  CT chest ordered -L GGO concerning for pneumonia possible hiatal hernia and possible anterior vertebral osteophyte near mid esophagus.  SLP consulted. MBSS and barium esophogram to assess for hiatal hernia (second episode of pneumonia since March 2025) and recurrent aspiration as a cause of repeat pneumonia. continue cefepime. completed azithromycin. denies hemoptysis this admission. last episode 2 weeks back. resumed prograf 1mg BID. continue prednisone. hold cellcept for now   5/2 s/p FEES. SLP recs regular diet/thin liquids CT chest -Bilateral multilobar consolidations and ground-glass opacities associated with bronchial thickening suggestive of infectious/inflammatory process. Right moderate and left small pleural effusions.  ID consulted for recurrent pneumonia/ immunosuppressed/ hemoptysis.  SBP 160s-180s. coreg previously due to bradycardia during hospitalization 2/202. discontinued Spironolactone 25 mg daily -  3/2024 due to getting LH and dizzy . procardia listed as allergy. Doxazosin increased to 8mg daiy. . HCTZ discontinued. started on torsemide 40mg daily. ID eval  -  resp infection panel and urine legionella  MRSA nares, resp culture      Review of Systems:   Pain scale:   Constitutional:  fever,  chills, headache, vision loss, hearing loss, weight loss, Generalized weakness, falls, loss of smell, loss of taste, poor appetite,  sore throat  Respiratory: cough, shortness of breath.   Cardiovascular: chest  pain, dizziness, palpitations, orthopnea, swelling of feet, syncope  Gastrointestinal: nausea, vomiting, abdominal pain, diarrhea, black stool,  blood in stool, change in bowel habits, constipation  Genitourinary: hematuria, dysuria, urgency, frequency  Integument/Breast: rash,  pruritis  Hematologic/Lymphatic: easy bruising, lymphadenopathy  Musculoskeletal: arthralgias , myalgias, back pain, neck pain, knee pain  Neurological: confusion, seizures, tremors, slurred speech  Behavioral/Psych:  depression, anxiety, auditory or visual hallucinations     OBJECTIVE:     Physical Exam:  Body mass index is 26.39 kg/m².    Constitutional: Appears well-developed and well-nourished.   Head: Normocephalic and atraumatic.   Neck: Normal range of motion. Neck supple.   Cardiovascular: Normal heart rate.  Regular heart rhythm.  Pulmonary/Chest: Effort normal.   Abdominal: No distension.  No tenderness  Musculoskeletal: Normal range of motion. No edema.   Neurological: Alert and oriented to person, place, and time.   Skin: Skin is warm and dry.   Psychiatric: Normal mood and affect. Behavior is normal.                  Vital Signs  Temp: 98.1 °F (36.7 °C) (05/02/25 1109)  Pulse: 79 (05/02/25 1109)  Resp: 20 (05/02/25 1109)  BP: (!) 183/84 (05/02/25 1109)  SpO2: 97 % (05/02/25 1109)     24 Hour VS Range    Temp:  [97.7 °F (36.5 °C)-98.4 °F (36.9 °C)]   Pulse:  []   Resp:  [18-20]   BP: (164-194)/()   SpO2:  [94 %-98 %]     Intake/Output Summary (Last 24 hours) at 5/2/2025 1455  Last data filed at 5/2/2025 1138  Gross per 24 hour   Intake --   Output 200 ml   Net -200 ml         I/O This Shift:  I/O this shift:  In: -   Out: 200 [Urine:200]    Wt Readings from Last 3 Encounters:   04/29/25 90.7 kg (200 lb)   04/22/25 92.8 kg (204 lb 9.4 oz)   04/16/25 91.9 kg (202 lb 9.6 oz)       I have personally reviewed the vitals and recorded Intake/Output     Laboratory/Diagnostic Data:    CBC/Anemia Labs: Coags:    Recent Labs  "  Lab 04/30/25  0619 05/01/25  0233 05/02/25  0215   WBC 5.03 4.19 5.71   HGB 8.7* 8.3* 8.5*   HCT 29.9* 28.0* 29.0*   * 108* 107*   MCV 77* 76* 78*   RDW 19.7* 19.5* 19.9*    No results for input(s): "PT", "INR", "APTT" in the last 168 hours.     Chemistries: ABG:   Recent Labs   Lab 04/29/25  0758 04/30/25  0619 05/01/25  0233 05/01/25  1159 05/02/25  0215   * 135* 136  --  135*   K 3.3* 3.9 3.5  --  3.5   CL 99 101 104  --  104   CO2 25 24 24  --  21*   BUN 49* 46* 47*  --  48*   CREATININE 2.7* 2.9* 2.6* 2.5* 2.4*   CALCIUM 8.2* 8.0* 8.0*  --  8.1*   PROT 5.9*  --   --   --   --    BILITOT 0.5  --   --   --   --    ALKPHOS 52  --   --   --   --    ALT <5*  --   --   --   --    AST 14  --   --   --   --    MG 2.2 2.3 2.4  --  2.3   PHOS  --  3.2 3.1  --  2.8    Recent Labs   Lab 04/29/25  0804   PH 7.439   PCO2 40.5   PO2 47.3   HCO3 26.8        POCT Glucose: HbA1c:    Recent Labs   Lab 04/30/25  2111 05/01/25  0820 05/01/25  1255 05/01/25  1626 05/01/25  2115 05/02/25  0755   POCTGLUCOSE 189* 101 93 117* 168* 139*    Hemoglobin A1C   Date Value Ref Range Status   01/27/2025 8.6 (H) 4.0 - 5.6 % Final     Comment:     ADA Screening Guidelines:  5.7-6.4%  Consistent with prediabetes  >or=6.5%  Consistent with diabetes    High levels of fetal hemoglobin interfere with the HbA1C  assay. Heterozygous hemoglobin variants (HbS, HgC, etc)do  not significantly interfere with this assay.   However, presence of multiple variants may affect accuracy.     12/07/2024 8.1 (H) 4.0 - 5.6 % Final     Comment:     ADA Screening Guidelines:  5.7-6.4%  Consistent with prediabetes  >or=6.5%  Consistent with diabetes    High levels of fetal hemoglobin interfere with the HbA1C  assay. Heterozygous hemoglobin variants (HbS, HgC, etc)do  not significantly interfere with this assay.   However, presence of multiple variants may affect accuracy.     11/19/2024 7.4 (H) 4.0 - 5.6 % Final     Comment:     ADA Screening " "Guidelines:  5.7-6.4%  Consistent with prediabetes  >or=6.5%  Consistent with diabetes    High levels of fetal hemoglobin interfere with the HbA1C  assay. Heterozygous hemoglobin variants (HbS, HgC, etc)do  not significantly interfere with this assay.   However, presence of multiple variants may affect accuracy.       Hemoglobin A1c   Date Value Ref Range Status   03/27/2025 8.3 (H) 4.0 - 5.6 % Final     Comment:     ADA Screening Guidelines:  5.7-6.4%  Consistent with prediabetes  >=6.5%  Consistent with diabetes    High levels of fetal hemoglobin interfere with the HbA1C  assay. Heterozygous hemoglobin variants (HbS, HgC, etc)do  not significantly interfere with this assay.   However, presence of multiple variants may affect accuracy.        Cardiac Enzymes: Ejection Fractions:    No results for input(s): "CPK", "CPKMB", "MB", "TROPONINI" in the last 72 hours. EF   Date Value Ref Range Status   02/21/2025 53 % Final   06/16/2024 58 % Final          No results for input(s): "COLORU", "APPEARANCEUA", "PHUR", "SPECGRAV", "PROTEINUA", "GLUCUA", "KETONESU", "BILIRUBINUA", "OCCULTUA", "NITRITE", "UROBILINOGEN", "LEUKOCYTESUR", "RBCUA", "WBCUA", "BACTERIA", "SQUAMEPITHEL", "HYALINECASTS" in the last 48 hours.    Invalid input(s): "WRIGHTSUR"    Lactate (Lactic Acid) (mmol/L)   Date Value   07/11/2022 1.0   07/10/2022 3.0 (H)   07/10/2022 3.1 (H)   03/15/2019 1.5   03/15/2019 3.3 (H)     BNP (pg/mL)   Date Value   03/16/2025 1,766 (H)   02/20/2025 1,055 (H)   06/25/2024 992 (H)   06/15/2024 3,203 (H)   06/04/2024 3,313 (H)     CRP (mg/L)   Date Value   04/02/2024 6.9   07/10/2022 40.5 (H)     Sed Rate   Date Value   04/02/2024 62 mm/Hr (H)   03/09/2007 2 mm/hr   05/10/2006 6 mm/hr     D-Dimer (mg/L FEU)   Date Value   07/10/2022 0.45     Ferritin (ng/mL)   Date Value   04/01/2024 816 (H)   07/10/2022 148   02/07/2022 73   05/26/2021 29   01/30/2017 251   11/08/2016 389 (H)     LD (U/L)   Date Value   04/01/2024 206 "   07/10/2022 221   11/10/2021 220     Troponin I (ng/mL)   Date Value   06/15/2024 0.812 (H)   06/15/2024 0.948 (H)   06/15/2024 0.934 (H)   05/02/2024 0.036 (H)   05/02/2024 0.049 (H)   07/11/2022 0.090 (H)   07/10/2022 0.265 (H)   03/16/2019 0.037 (H)     CPK (U/L)   Date Value   07/10/2022 83   03/16/2019 56   05/09/2008 86   05/15/2006 87   05/10/2006 44   05/10/2006 29   05/09/2006 40     Results for orders placed or performed in visit on 07/11/24   Vitamin D    Collection Time: 07/11/24  4:44 PM   Result Value Ref Range    Vit D, 25-Hydroxy 17 (L) 30 - 96 ng/mL     *Note: Due to a large number of results and/or encounters for the requested time period, some results have not been displayed. A complete set of results can be found in Results Review.     SARS-CoV2 (COVID-19) Qualitative PCR (no units)   Date Value   03/16/2025 Not Detected   12/18/2020 Not Detected     SARS-CoV-2 RNA, Amplification, Qual (no units)   Date Value   03/16/2025 Negative   12/07/2024 Negative     POC Rapid COVID (no units)   Date Value   07/08/2022 Positive (A)   08/02/2021 Positive (A)       Microbiology labs for the last week  Microbiology Results (last 7 days)       Procedure Component Value Units Date/Time    Respiratory Infection Panel (PCR), Nasopharyngeal [[id number]] Collected: 05/02/25 1027    Order Status: Sent Specimen: Nasopharyngeal Swab Updated: 05/02/25 1434    Blood culture x two cultures. Draw prior to antibiotics. [[id number]]  (Normal) Collected: 04/29/25 0752    Order Status: Completed Specimen: Blood from Peripheral, Forearm, Left Updated: 05/01/25 1801     Blood Culture No Growth After 48 Hours    Blood culture x two cultures. Draw prior to antibiotics. [[id number]]  (Normal) Collected: 04/29/25 0756    Order Status: Completed Specimen: Blood from Peripheral, Antecubital, Left Updated: 05/01/25 1800     Blood Culture No Growth After 48 Hours    MRSA Screen by PCR [[id number]]     Order Status: Sent Specimen: Nasal  Swab     Culture, Respiratory with Gram Stain [[id number]]     Order Status: Sent Specimen: Respiratory     Culture, Respiratory with Gram Stain [[id number]]     Order Status: Sent Specimen: Respiratory     Influenza A & B by Molecular [[id number]]  (Normal) Collected: 04/29/25 0749    Order Status: Completed Specimen: Nasal Swab Updated: 04/29/25 0836     INFLUENZA A MOLECULAR Negative     INFLUENZA B MOLECULAR  Negative            Reviewed and noted in plan where applicable- Please see chart for full lab data.    Lines/Drains:       Peripheral IV - Single Lumen 04/30/25 1112 20 G Anterior;Distal;Right Upper Arm (Active)   Site Assessment Clean;Dry;Intact;No redness;No swelling;No drainage 05/01/25 0800   Line Status Saline locked;Flushed 05/01/25 0800   Dressing Status Intact;Dry;Clean 05/01/25 0800   Dressing Intervention Integrity maintained 05/01/25 0800   Number of days: 1       Imaging  ECG Results              EKG 12-lead (Final result)        Collection Time Result Time QRS Duration OHS QTC Calculation    04/29/25 07:35:29 04/29/25 07:45:23 112 460                     Final result by Interface, Lab In [facility] (04/29/25 07:45:29)                   Narrative:    Test Reason : Z13.6,    Vent. Rate :  78 BPM     Atrial Rate : 300 BPM     P-R Int :    ms          QRS Dur : 112 ms      QT Int : 404 ms       P-R-T Axes : -89 105   8 degrees    QTcB Int : 460 ms    Atrial flutter  Rightward axis  Nonspecific ST and T wave abnormality  Low septal forces ; Abnormal R wave progression in the precordial leads  -Possible anterior infarct  Possible lateral infarct vs lead reversal  Prolonged QT  Abnormal ECG  When compared with ECG of 16-Apr-2025 13:38,  The axis Shifted right  ST no longer elevated in Anterior leads  Confirmed by Tejas Carter ([id number]) on 4/29/2025 7:45:21 AM    Referred By: AAAREFERRAL SELF           Confirmed By: Tejas Carter                                    Results for orders placed during the hospital  encounter of 02/20/25    Echo    Interpretation Summary    Left Ventricle: There is mild concentric hypertrophy. There is low normal systolic function with a visually estimated ejection fraction of 50 - 55%. Grade III diastolic dysfunction.    Left Ventricle: The left ventricle is at the upper limits of normal in size. Mildly increased wall thickness. There is mild concentric hypertrophy. Regional wall motion abnormalities present. See diagram for wall motion findings. There is low normal systolic function with a visually estimated ejection fraction of 50 - 55%. Ejection fraction is approximately 53%. Grade III diastolic dysfunction.    Right Ventricle: Systolic function is borderline low despite the low TAPSE.    Left Atrium: Left atrium is moderately dilated.    Aortic Valve: The aortic valve is a trileaflet valve. There is mild aortic valve sclerosis. There is moderate annular calcification present. There is mild aortic regurgitation with a centrally directed jet.    Mitral Valve: There is mild regurgitation with a centrally directed jet.    Pericardium: There is a trivial posterior effusion and under the RA.    Tricuspid Valve: There is mild regurgitation.    Pulmonary Artery: The estimated pulmonary artery systolic pressure is 30 mmHg.      CT Chest Without Contrast  Narrative: EXAMINATION:  CT CHEST WITHOUT CONTRAST    CLINICAL HISTORY:  Pneumonia, unresolved;    TECHNIQUE:  CT chest without contrast acquiring images from above the pulmonary apices to the upper abdomen.  Data was reconstructed for multiplanar images in axial, sagittal and coronal planes and for maximal intensity projection images in the the axial plane.    COMPARISON:  None.    FINDINGS:  Base of Neck/thoracic soft tissues: No significant abnormality.    Aorta: Left-sided aortic arch. No aneurysm. Three vessel aortic arch. Moderate calcified atherosclerosis of the thoracic aorta.    Heart/pericardium: Dilated left ventricle moderate  multivessel calcified atherosclerosis of the coronary arteries.  No pericardial effusion.    Airways/Lungs/Pleura:  Central airways are patent. Bilateral multifocal ground glass opacities and small irregular consolidations associated with bronchial wall thickening.  Right moderate and left small pleural effusions.  Mild apical paraseptal emphysema.  No pneumothorax.    Pulmonary vasculature: Unremarkable.    Marlen/Mediastinum: No pathologic christiano enlargement.    Esophagus: Unremarkable.    Upper Abdomen: Moderate abdominal calcified atherosclerosis involving the celiac trunk    Bones: Anterior large bridging osteophytes between T8-T9. No suspicious osseous lesion.  Impression: Bilateral multilobar consolidations and ground-glass opacities associated with bronchial thickening suggestive of infectious/inflammatory process.    Right moderate and left small pleural effusions.    Electronically signed by: Matthew Moore  Date:    05/01/2025  Time:    16:27      Labs, Imaging, EKG and Diagnostic results from 5/2/2025 were reviewed.    Medications:  Medication list was reviewed and changes noted under Assessment/Plan.  Medications Ordered Prior to Encounter[1]  Scheduled Medications:  Current Facility-Administered Medications   Medication Dose Route Frequency    acetaminophen  1,000 mg Oral Q12H    atorvastatin  80 mg Oral Daily    ceFEPime IV (PEDS and ADULTS)  2 g Intravenous Q12H    doxazosin  8 mg Oral Daily    empagliflozin  10 mg Oral Daily    gabapentin  300 mg Oral Daily    gabapentin  300 mg Oral After lunch    gabapentin  300 mg Oral QHS    hydrALAZINE  100 mg Oral Q8H    insulin glargine U-100  15 Units Subcutaneous QHS    Lactobacillus rhamnosus GG  1 capsule Oral Daily    nitroGLYCERIN 2% TD oint  2 inch Topical (Top) Q8H    polyethylene glycol  17 g Oral Daily    predniSONE  5 mg Oral Daily    rivaroxaban  15 mg Oral Daily with dinner    tacrolimus  1 mg Oral BID    torsemide  40 mg Oral Daily     valsartan  320 mg Oral Daily     PRN:   Current Facility-Administered Medications:     albuterol-ipratropium, 3 mL, Nebulization, Q6H PRN    dextrose 50%, 12.5 g, Intravenous, PRN    dextrose 50%, 25 g, Intravenous, PRN    glucagon (human recombinant), 1 mg, Intramuscular, PRN    glucose, 16 g, Oral, PRN    glucose, 24 g, Oral, PRN    hydrALAZINE, 15 mg, Intravenous, Q6H PRN    insulin aspart U-100, 0-10 Units, Subcutaneous, QID (AC + HS) PRN    meclizine, 25 mg, Oral, TID PRN    melatonin, 6 mg, Oral, Nightly PRN    naloxone, 0.02 mg, Intravenous, PRN    ondansetron, 8 mg, Oral, Q8H PRN    prochlorperazine, 5 mg, Intravenous, Q6H PRN    sodium chloride 0.9%, 10 mL, Intravenous, Q8H PRN  Infusions:   Estimated Creatinine Clearance: 33.8 mL/min (A) (based on SCr of 2.4 mg/dL (H)).             Assessment & Plan  Pneumonia  Hemoptysis    Patient has a diagnosis of pneumonia. The cause of the pneumonia is suspected to be bacterial in etiology but organism is not known. The pneumonia is stable. The patient has the following signs/symptoms of pneumonia: cough. The patient does not have a current oxygen requirement and the patient does not have a home oxygen requirement. I have reviewed the pertinent imaging. The following cultures have been collected: Blood cultures and Sputum culture The culture results are listed below.     Current antimicrobial regimen consists of the antibiotics listed below. Will monitor patient closely and continue current treatment plan unchanged.    -Given fevers/chills and recurrent pna, reasonable to continue below abx and deescalate when indicated  -f/u sputum, blood cx    Antibiotics (From admission, onward)      Start     Stop Route Frequency Ordered    05/02/25 1000  ceFEPIme injection 2 g         -- IV Every 12 hours (non-standard times) 05/02/25 0745    04/30/25 0900  azithromycin (ZITHROMAX) 500 mg in 0.9% NaCl 250 mL IVPB (admixture device)         05/01/25 1259 IV Every 24 hours  (non-standard times) 04/29/25 1016          Microbiology Results (last 7 days)       Procedure Component Value Units Date/Time    Respiratory Infection Panel (PCR), Nasopharyngeal [[id number]] Collected: 05/02/25 1027    Order Status: Sent Specimen: Nasopharyngeal Swab Updated: 05/02/25 1434    Blood culture x two cultures. Draw prior to antibiotics. [[id number]]  (Normal) Collected: 04/29/25 0758    Order Status: Completed Specimen: Blood from Peripheral, Forearm, Left Updated: 05/01/25 1801     Blood Culture No Growth After 48 Hours    Blood culture x two cultures. Draw prior to antibiotics. [[id number]]  (Normal) Collected: 04/29/25 0758    Order Status: Completed Specimen: Blood from Peripheral, Antecubital, Left Updated: 05/01/25 1801     Blood Culture No Growth After 48 Hours    MRSA Screen by PCR [[id number]]     Order Status: Sent Specimen: Nasal Swab     Culture, Respiratory with Gram Stain [[id number]]     Order Status: Sent Specimen: Respiratory     Culture, Respiratory with Gram Stain [[id number]]     Order Status: Sent Specimen: Respiratory     Influenza A & B by Molecular [[id number]]  (Normal) Collected: 04/29/25 0749    Order Status: Completed Specimen: Nasal Swab Updated: 04/29/25 0836     INFLUENZA A MOLECULAR Negative     INFLUENZA B MOLECULAR  Negative          5/1  BC x2 4/29 NGTD. vancomycins discontinued.  respiratory cultures pending.  CT chest ordered -L GGO concerning for pneumonia possible hiatal hernia and possible anterior vertebral osteophyte near mid esophagus.  SLP consulted  and consider esophogram to assess for hiatal hernia and recurrent aspiration as a cause of repeat pneumonia. continue cefepime. completed azithromycin. denies hemoptysis this admission. last episode 2 weeks back. resumed prograf 1mg BID. continue prednisone. hold cellcept for now     Patient has a diagnosis of pneumonia. The cause of the pneumonia is suspected to be bacterial in etiology but organism is not  known. The pneumonia is stable. The patient has the following signs/symptoms of pneumonia: cough. The patient does not have a current oxygen requirement and the patient does not have a home oxygen requirement. I have reviewed the pertinent imaging. The following cultures have been collected: Blood cultures and Sputum culture The culture results are listed below.     Current antimicrobial regimen consists of the antibiotics listed below. Will monitor patient closely and continue current treatment plan unchanged.    -Given fevers/chills and recurrent pna, reasonable to continue below abx and deescalate when indicated  -f/u sputum, blood cx    Antibiotics (From admission, onward)      Start     Stop Route Frequency Ordered    05/02/25 1000  ceFEPIme injection 2 g         -- IV Every 12 hours (non-standard times) 05/02/25 0745    04/30/25 0900  azithromycin (ZITHROMAX) 500 mg in 0.9% NaCl 250 mL IVPB (admixture device)         05/01/25 1259 IV Every 24 hours (non-standard times) 04/29/25 1016          Antibiotics (From admission, onward)      Start     Stop Route Frequency Ordered    05/02/25 1000  ceFEPIme injection 2 g         -- IV Every 12 hours (non-standard times) 05/02/25 0745    04/30/25 0900  azithromycin (ZITHROMAX) 500 mg in 0.9% NaCl 250 mL IVPB (admixture device)         05/01/25 1259 IV Every 24 hours (non-standard times) 04/29/25 1016          5/1  PMH of DM, renal transplant 2016, CHFpEF, AF/AFL (PVI/PWI/CTI 6/2024), CVA, recurrent PNA who presents with fevers, chills.  States that symptoms are similar to last month when he was admitted for pneumonia. No other pna symptoms despite CXR changes.BC x2 4/29 NGTD. vancomycins discontinued.  respiratory cultures pending.  CT chest ordered -RML GGO concerning for pneumonia possible hiatal hernia and possible anterior vertebral osteophyte near mid esophagus.  SLP consulted  and  barium esophogram to assess for hiatal hernia (second episode of pneumonia since  March 2025) and recurrent aspiration as a cause of repeat pneumonia. continue cefepime. completed azithromycin. denies hemoptysis this admission. last episode 2 weeks back. resumed prograf 1mg BID. continue prednisone. hold cellcept for now     Patient has a diagnosis of pneumonia. The cause of the pneumonia is suspected to be bacterial in etiology but organism is not known. The pneumonia is stable. The patient has the following signs/symptoms of pneumonia: cough. The patient does not have a current oxygen requirement and the patient does not have a home oxygen requirement. I have reviewed the pertinent imaging. The following cultures have been collected: Blood cultures and Sputum culture The culture results are listed below.     Current antimicrobial regimen consists of the antibiotics listed below. Will monitor patient closely and continue current treatment plan unchanged.    -Given fevers/chills and recurrent pna, reasonable to continue below abx and deescalate when indicated  -f/u sputum, blood cx    Antibiotics (From admission, onward)      Start     Stop Route Frequency Ordered    05/02/25 1000  ceFEPIme injection 2 g         -- IV Every 12 hours (non-standard times) 05/02/25 0745    04/30/25 0900  azithromycin (ZITHROMAX) 500 mg in 0.9% NaCl 250 mL IVPB (admixture device)         05/01/25 1259 IV Every 24 hours (non-standard times) 04/29/25 1016          Microbiology Results (last 7 days)       Procedure Component Value Units Date/Time    Respiratory Infection Panel (PCR), Nasopharyngeal [[id number]] Collected: 05/02/25 1027    Order Status: Sent Specimen: Nasopharyngeal Swab Updated: 05/02/25 1434    Blood culture x two cultures. Draw prior to antibiotics. [[id number]]  (Normal) Collected: 04/29/25 0758    Order Status: Completed Specimen: Blood from Peripheral, Forearm, Left Updated: 05/01/25 1801     Blood Culture No Growth After 48 Hours    Blood culture x two cultures. Draw prior to antibiotics.  [[id number]]  (Normal) Collected: 04/29/25 0758    Order Status: Completed Specimen: Blood from Peripheral, Antecubital, Left Updated: 05/01/25 1801     Blood Culture No Growth After 48 Hours    MRSA Screen by PCR [[id number]]     Order Status: Sent Specimen: Nasal Swab     Culture, Respiratory with Gram Stain [[id number]]     Order Status: Sent Specimen: Respiratory     Culture, Respiratory with Gram Stain [[id number]]     Order Status: Sent Specimen: Respiratory     Influenza A & B by Molecular [[id number]]  (Normal) Collected: 04/29/25 0749    Order Status: Completed Specimen: Nasal Swab Updated: 04/29/25 0836     INFLUENZA A MOLECULAR Negative     INFLUENZA B MOLECULAR  Negative          5/1  BC x2 4/29 NGTD. vancomycins discontinued.  respiratory cultures pending.  CT chest ordered -L GGO concerning for pneumonia possible hiatal hernia and possible anterior vertebral osteophyte near mid esophagus.  SLP consulted  and consider esophogram to assess for hiatal hernia and recurrent aspiration as a cause of repeat pneumonia. continue cefepime. completed azithromycin. denies hemoptysis this admission. last episode 2 weeks back. resumed prograf 1mg BID. continue prednisone. hold cellcept for now     Patient has a diagnosis of pneumonia. The cause of the pneumonia is suspected to be bacterial in etiology but organism is not known. The pneumonia is stable. The patient has the following signs/symptoms of pneumonia: cough. The patient does not have a current oxygen requirement and the patient does not have a home oxygen requirement. I have reviewed the pertinent imaging. The following cultures have been collected: Blood cultures and Sputum culture The culture results are listed below.     Current antimicrobial regimen consists of the antibiotics listed below. Will monitor patient closely and continue current treatment plan unchanged.    -Given fevers/chills and recurrent pna, reasonable to continue below abx and  deescalate when indicated  -f/u sputum, blood cx    Antibiotics (From admission, onward)      Start     Stop Route Frequency Ordered    05/02/25 1000  ceFEPIme injection 2 g         -- IV Every 12 hours (non-standard times) 05/02/25 0745    04/30/25 0900  azithromycin (ZITHROMAX) 500 mg in 0.9% NaCl 250 mL IVPB (admixture device)         05/01/25 1259 IV Every 24 hours (non-standard times) 04/29/25 1016          Antibiotics (From admission, onward)      Start     Stop Route Frequency Ordered    05/02/25 1000  ceFEPIme injection 2 g         -- IV Every 12 hours (non-standard times) 05/02/25 0745    04/30/25 0900  azithromycin (ZITHROMAX) 500 mg in 0.9% NaCl 250 mL IVPB (admixture device)         05/01/25 1259 IV Every 24 hours (non-standard times) 04/29/25 1016          Type 2 diabetes mellitus with stage 3a chronic kidney disease, with long-term current use of insulin  Creatine stable for now. BMP reviewed- noted Estimated Creatinine Clearance: 33.8 mL/min (A) (based on SCr of 2.4 mg/dL (H)). according to latest data. Based on current GFR, CKD stage is stage 3 - GFR 30-59.  Monitor UOP and serial BMP and adjust therapy as needed. Renally dose meds. Avoid nephrotoxic medications and procedures.  Essential hypertension  Patient's blood pressure range in the last 24 hours was: BP  Min: 164/80  Max: 194/106.The patient's inpatient anti-hypertensive regimen is listed below:  Current Antihypertensives  hydrALAZINE tablet 100 mg, Every 8 hours, Oral  valsartan tablet 320 mg, Daily, Oral  hydrALAZINE injection 15 mg, Every 6 hours PRN, Intravenous  nitroGLYCERIN 2% TD oint ointment 2 inch, Every 8 hours, Topical (Top)  doxazosin tablet 8 mg, Daily, Oral  torsemide tablet 40 mg, Daily, Oral    Plan  - BP is controlled, no changes needed to their regimen  Hyperlipidemia  Continue statin    Prophylactic immunotherapy  KTM consutled for assistance with management     KTM consutled for assistance with management     KTM  "consutled for assistance with management     Chronic combined systolic (congestive) and diastolic (congestive) heart failure  Patient has Diastolic (HFpEF) heart failure that is Chronic. On presentation their CHF was well compensated. Most recent BNP and echo results are listed below.  No results for input(s): "BNP" in the last 72 hours.  Latest ECHO  Results for orders placed during the hospital encounter of 02/20/25    Echo    Interpretation Summary    Left Ventricle: There is mild concentric hypertrophy. There is low normal systolic function with a visually estimated ejection fraction of 50 - 55%. Grade III diastolic dysfunction.    Left Ventricle: The left ventricle is at the upper limits of normal in size. Mildly increased wall thickness. There is mild concentric hypertrophy. Regional wall motion abnormalities present. See diagram for wall motion findings. There is low normal systolic function with a visually estimated ejection fraction of 50 - 55%. Ejection fraction is approximately 53%. Grade III diastolic dysfunction.    Right Ventricle: Systolic function is borderline low despite the low TAPSE.    Left Atrium: Left atrium is moderately dilated.    Aortic Valve: The aortic valve is a trileaflet valve. There is mild aortic valve sclerosis. There is moderate annular calcification present. There is mild aortic regurgitation with a centrally directed jet.    Mitral Valve: There is mild regurgitation with a centrally directed jet.    Pericardium: There is a trivial posterior effusion and under the RA.    Tricuspid Valve: There is mild regurgitation.    Pulmonary Artery: The estimated pulmonary artery systolic pressure is 30 mmHg.    Current Heart Failure Medications  hydrALAZINE tablet 100 mg, Every 8 hours, Oral  valsartan tablet 320 mg, Daily, Oral  empagliflozin (Jardiance) tablet 10 mg, Daily, Oral  hydrALAZINE injection 15 mg, Every 6 hours PRN, Intravenous  torsemide tablet 40 mg, Daily, Oral    Plan  - " Monitor strict I&Os and daily weights.    - Place on telemetry  - Low sodium diet  - Cardiology has not been consulted  - The patient's volume status is at their baseline  S/P cadaveric kidney transplant 11/5/2016. ESRD secondary to HTN/DMII  -KTM consulted  -daily tacro level, dosing per KTM  -Cont prednisone    Immunocompromised state due to drug therapy  KTM consutled for assistance with management     KTM consutled for assistance with management     KTM consutled for assistance with management     CKD IV (severe)  Creatine stable for now. BMP reviewed- noted Estimated Creatinine Clearance: 33.8 mL/min (A) (based on SCr of 2.4 mg/dL (H)). according to latest data. Based on current GFR, CKD stage is stage 3 - GFR 30-59.  Monitor UOP and serial BMP and adjust therapy as needed. Renally dose meds. Avoid nephrotoxic medications and procedures.    Recent Labs   Lab 04/30/25  0619 05/01/25  0233 05/01/25  1159 05/02/25  0215   BUN 46* 47*  --  48*   CREATININE 2.9* 2.6* 2.5* 2.4*       I & O     Intake/Output Summary (Last 24 hours) at 5/2/2025 1455  Last data filed at 5/2/2025 1138  Gross per 24 hour   Intake --   Output 200 ml   Net -200 ml      Type 2 diabetes mellitus  Patient's FSGs are controlled on current medication regimen.  Last A1c reviewed-   Lab Results   Component Value Date    HGBA1C 8.3 (H) 03/27/2025     Most recent fingerstick glucose reviewed-   Recent Labs   Lab 05/01/25  1626 05/01/25  2115 05/02/25  0755   POCTGLUCOSE 117* 168* 139*     Current correctional scale  Medium  Maintain anti-hyperglycemic dose as follows-   Antihyperglycemics (From admission, onward)      Start     Stop Route Frequency Ordered    04/29/25 2100  insulin glargine U-100 (Lantus) pen 15 Units         -- SubQ Nightly 04/29/25 1016    04/29/25 1115  empagliflozin (Jardiance) tablet 10 mg        Question Answer Comment   Does this patient have a diagnosis of heart failure? Yes    Does this patient have type 1 diabetes or  diabetic ketoacidosis? No    Does this patient have symptomatic hypotension? No    Is the patient NPO or pending major surgery in next 3 days or less? No        -- Oral Daily 04/29/25 1016    04/29/25 1113  insulin aspart U-100 pen 0-10 Units         -- SubQ Before meals & nightly PRN 04/29/25 1016          Hold Oral hypoglycemics while patient is in the hospital.    Blood glucose acceptable  Persistent atrial fibrillation  Patient has persistent (7 days or more) atrial fibrillation. Patient is currently in atrial fibrillation. [id number] Score: 3. The patients heart rate in the last 24 hours is as follows:  Pulse  Min: 77  Max: 106     Antiarrhythmics       Anticoagulants  rivaroxaban tablet 15 mg, With dinner, Oral    Plan  - Replete lytes with a goal of K>4, Mg >2  - Patient is anticoagulated, see medications listed above.  - Patient's afib is currently controlled  Anticoagulant long-term use  This patient has long term use on an anticoagulant with Select Anticoagulant(s): Direct oral anticoagulant: Rivaroxaban (Xarelto). Their long term anticoagulation will be Held or Continued: continued. They are on long term anticoagulation due to Reason for Anticoagulation: Atrial fibrillation.   BPH with urinary obstruction  Doxazosin   Immunosuppressed status  KTM consutled for assistance with management     KTM consutled for assistance with management     KTM consutled for assistance with management     Anemia of chronic disease  Anemia is likely due to chronic disease due to Chronic Kidney Disease. Most recent hemoglobin and hematocrit are listed below.  Recent Labs     04/30/25  0619 05/01/25  0233 05/02/25  0215   HGB 8.7* 8.3* 8.5*   HCT 29.9* 28.0* 29.0*     Plan  - Monitor serial CBC: Daily  - Transfuse PRBC if patient becomes hemodynamically unstable, symptomatic or H/H drops below 7/21.  - Patient has not received any PRBC transfusions to date  - Patient's anemia is currently stable  Hypokalemia  Patient's most  recent potassium results are listed below.   Recent Labs     04/30/25  0619 05/01/25  0233 05/02/25  0215   K 3.9 3.5 3.5     Plan  - Replete potassium per protocol  - Monitor potassium Daily  - Patient's hypokalemia is stable  Pleural effusion  5/2 CT chest -Bilateral multilobar consolidations and ground-glass opacities associated with bronchial thickening suggestive of infectious/inflammatory process. Right moderate and left small pleural effusions.  ID consulted for recurrent pneumonia/ immunosuppressed/ hemoptysis.      VTE Risk Mitigation (From admission, onward)           Ordered     rivaroxaban tablet 15 mg  With dinner         04/29/25 1016     IP VTE HIGH RISK PATIENT  Once         04/29/25 1016     Place sequential compression device  Until discontinued         04/29/25 1016                    Discharge Planning   UBALDO: 5/4/2025     Code Status: Full Code   Medical Readiness for Discharge Date:   Discharge Plan A: Home with family                        Cliff Guaman MD  Department of Hospital Medicine   [facility]         [1]   Current Facility-Administered Medications on File Prior to Encounter   Medication Dose Route Frequency Provider Last Rate Last Admin    balanced salt irrigation intra-ocular solution 1 drop  1 drop Right Eye On Call Procedure Jennifer Palacio MD        phenylephrine HCL 10% ophthalmic solution 1 drop  1 drop Right Eye PRN Jennifer Palacio MD        proparacaine 0.5 % ophthalmic solution 1 drop  1 drop Right Eye Daily PRN Jennifer Palacio MD        sodium chloride 0.9% flush 10 mL  10 mL Intravenous PRN Jennifer Palacio MD        TETRAcaine HCl (PF) 0.5 % Drop 1 drop  1 drop Right Eye PRN Jennifer Palacio MD         Current Outpatient Medications on File Prior to Encounter   Medication Sig Dispense Refill    atorvastatin (LIPITOR) 80 MG tablet Take 1 tablet (80 mg total) by mouth once daily. 90 tablet 3    blood sugar diagnostic Strp Use to check blood glucose  1 times daily, to use with insurance preferred meter 100 each 11    blood-glucose meter Misc Use to check blood glucose 1 times daily, to use with insurance preferred meter 1 each 0    blood-glucose sensor (DEXCOM G7 SENSOR) Kayla Change sensor every 10 days. 3 each 11    doxazosin (CARDURA) 4 MG tablet Take 1 tablet (4 mg total) by mouth once daily. 90 tablet 3    empagliflozin (JARDIANCE) 10 mg tablet Take 1 tablet (10 mg total) by mouth once daily. 90 tablet 0    ergocalciferol (ERGOCALCIFEROL) 50,000 unit Cap Take 1 capsule (50,000 Units total) by mouth every 7 days. 4 capsule 6    furosemide (LASIX) 40 MG tablet Take 2 tablets (80 mg total) by mouth daily as needed (for swelling).      gabapentin (NEURONTIN) 300 MG capsule Take 1 capsule by mouth in the morning, 1 capsule midday, and 3 capsules at night. 450 capsule 3    hydrALAZINE (APRESOLINE) 100 MG tablet Take 1 tablet (100 mg total) by mouth every 8 (eight) hours. 270 tablet 3    hydroCHLOROthiazide 12.5 MG Tab Take 1 tablet (12.5 mg total) by mouth once daily. 30 tablet 11    insulin aspart U-100 (NOVOLOG U-100 INSULIN ASPART) 100 unit/mL injection Use in pump, max daily 100 units. 30 mL 11    insulin aspart U-100 (NOVOLOG) 100 unit/mL (3 mL) InPn pen Inject 10 units under the skin w/ breakfast, 7 units at lunch and dinner. Scale 180-230+2, 231-280+4, 281-330+6, 331-380+8, >380+10.  Max daily 57 units. (Patient not taking: Reported on 4/22/2025) 30 mL 11    insulin glargine U-100, Lantus, (LANTUS SOLOSTAR U-100 INSULIN) 100 unit/mL (3 mL) InPn pen Inject 20 Units into the skin every morning. (Patient not taking: Reported on 4/22/2025) 15 mL 6    insulin  cart,aut,G6/7,cntr (OMNIPOD 5 G6-G7 INTRO KT,GEN5,) Crtg Use as directed. 1 each 1    insulin pump cart,auto,BT,G6/7 (OMNIPOD 5 G6-G7 PODS, GEN 5,) Crtg Change every 48 hours. 45 each 3    Lactobacillus rhamnosus GG (CULTURELLE) 10 billion cell capsule Take 1 capsule by mouth once daily. 30 capsule 0     "lancets 30 gauge Misc Use to check blood glucose 1 times daily, to use with insurance preferred meter 100 each 3    magnesium oxide (MAG-OX) 400 mg (241.3 mg magnesium) tablet Take 1 tablet (400 mg total) by mouth 2 (two) times daily. 60 tablet 11    meclizine (ANTIVERT) 25 mg tablet Take 1 tablet (25 mg total) by mouth 3 (three) times daily as needed for Dizziness. 21 tablet 3    [Paused] mycophenolate (CELLCEPT) 250 mg Cap Take 2 capsules (500 mg total) by mouth 2 (two) times daily. 120 capsule 11    pen needle, diabetic (BD ULTRA-FINE LO PEN NEEDLE) 32 gauge x 5/32" Ndle USE TO ADMINISTER INSULIN 4 TIMES DAILY. 400 each 3    polyethylene glycol (MIRALAX) 17 gram PwPk Take 17 gm (mixed in liquid) by mouth every day.      predniSONE (DELTASONE) 5 MG tablet Take 1 tablet (5 mg total) by mouth once daily. 30 tablet 11    rivaroxaban (XARELTO) 15 mg Tab Take 1 tablet (15 mg total) by mouth daily with dinner or evening meal. 30 tablet 11    tacrolimus (PROGRAF) 1 MG Cap Take 1 capsule (1 mg total) by mouth every 12 (twelve) hours. 60 capsule 11    valsartan (DIOVAN) 320 MG tablet Take 1 tablet (320 mg total) by mouth once daily. 90 tablet 3    [DISCONTINUED] insulin lispro (HUMALOG KWIKPEN INSULIN) 100 unit/mL pen Inject 18 units w/ breakfast, 16 units w/ lunch and dinner plus scale 150-200 +2, 201-250 +4, 251-300 +6, 301-350 +8. 30 mL 4     "

## 2025-05-02 NOTE — ASSESSMENT & PLAN NOTE
Patient's FSGs are controlled on current medication regimen.  Last A1c reviewed-   Lab Results   Component Value Date    HGBA1C 8.3 (H) 03/27/2025     Most recent fingerstick glucose reviewed-   Recent Labs   Lab 05/01/25  1626 05/01/25  2115 05/02/25  0755   POCTGLUCOSE 117* 168* 139*     Current correctional scale  Medium  Maintain anti-hyperglycemic dose as follows-   Antihyperglycemics (From admission, onward)      Start     Stop Route Frequency Ordered    04/29/25 2100  insulin glargine U-100 (Lantus) pen 15 Units         -- SubQ Nightly 04/29/25 1016    04/29/25 1115  empagliflozin (Jardiance) tablet 10 mg        Question Answer Comment   Does this patient have a diagnosis of heart failure? Yes    Does this patient have type 1 diabetes or diabetic ketoacidosis? No    Does this patient have symptomatic hypotension? No    Is the patient NPO or pending major surgery in next 3 days or less? No        -- Oral Daily 04/29/25 1016    04/29/25 1113  insulin aspart U-100 pen 0-10 Units         -- SubQ Before meals & nightly PRN 04/29/25 1016          Hold Oral hypoglycemics while patient is in the hospital.    Blood glucose acceptable

## 2025-05-02 NOTE — ASSESSMENT & PLAN NOTE
Patient has a diagnosis of pneumonia. The cause of the pneumonia is suspected to be bacterial in etiology but organism is not known. The pneumonia is stable. The patient has the following signs/symptoms of pneumonia: cough. The patient does not have a current oxygen requirement and the patient does not have a home oxygen requirement. I have reviewed the pertinent imaging. The following cultures have been collected: Blood cultures and Sputum culture The culture results are listed below.     Current antimicrobial regimen consists of the antibiotics listed below. Will monitor patient closely and continue current treatment plan unchanged.    -Given fevers/chills and recurrent pna, reasonable to continue below abx and deescalate when indicated  -f/u sputum, blood cx    Antibiotics (From admission, onward)      Start     Stop Route Frequency Ordered    05/02/25 1000  ceFEPIme injection 2 g         -- IV Every 12 hours (non-standard times) 05/02/25 0745    04/30/25 0900  azithromycin (ZITHROMAX) 500 mg in 0.9% NaCl 250 mL IVPB (admixture device)         05/01/25 1259 IV Every 24 hours (non-standard times) 04/29/25 1016          Microbiology Results (last 7 days)       Procedure Component Value Units Date/Time    Respiratory Infection Panel (PCR), Nasopharyngeal [[id number]] Collected: 05/02/25 1027    Order Status: Sent Specimen: Nasopharyngeal Swab Updated: 05/02/25 1434    Blood culture x two cultures. Draw prior to antibiotics. [[id number]]  (Normal) Collected: 04/29/25 0758    Order Status: Completed Specimen: Blood from Peripheral, Forearm, Left Updated: 05/01/25 1801     Blood Culture No Growth After 48 Hours    Blood culture x two cultures. Draw prior to antibiotics. [[id number]]  (Normal) Collected: 04/29/25 0758    Order Status: Completed Specimen: Blood from Peripheral, Antecubital, Left Updated: 05/01/25 1801     Blood Culture No Growth After 48 Hours    MRSA Screen by PCR [[id number]]     Order Status: Sent  Specimen: Nasal Swab     Culture, Respiratory with Gram Stain [[id number]]     Order Status: Sent Specimen: Respiratory     Culture, Respiratory with Gram Stain [[id number]]     Order Status: Sent Specimen: Respiratory     Influenza A & B by Molecular [[id number]]  (Normal) Collected: 04/29/25 0749    Order Status: Completed Specimen: Nasal Swab Updated: 04/29/25 0836     INFLUENZA A MOLECULAR Negative     INFLUENZA B MOLECULAR  Negative          5/1  BC x2 4/29 NGTD. vancomycins discontinued.  respiratory cultures pending.  CT chest ordered -L GGO concerning for pneumonia possible hiatal hernia and possible anterior vertebral osteophyte near mid esophagus.  SLP consulted  and consider esophogram to assess for hiatal hernia and recurrent aspiration as a cause of repeat pneumonia. continue cefepime. completed azithromycin. denies hemoptysis this admission. last episode 2 weeks back. resumed prograf 1mg BID. continue prednisone. hold cellcept for now     Patient has a diagnosis of pneumonia. The cause of the pneumonia is suspected to be bacterial in etiology but organism is not known. The pneumonia is stable. The patient has the following signs/symptoms of pneumonia: cough. The patient does not have a current oxygen requirement and the patient does not have a home oxygen requirement. I have reviewed the pertinent imaging. The following cultures have been collected: Blood cultures and Sputum culture The culture results are listed below.     Current antimicrobial regimen consists of the antibiotics listed below. Will monitor patient closely and continue current treatment plan unchanged.    -Given fevers/chills and recurrent pna, reasonable to continue below abx and deescalate when indicated  -f/u sputum, blood cx    Antibiotics (From admission, onward)      Start     Stop Route Frequency Ordered    05/02/25 1000  ceFEPIme injection 2 g         -- IV Every 12 hours (non-standard times) 05/02/25 0745    04/30/25 0900   azithromycin (ZITHROMAX) 500 mg in 0.9% NaCl 250 mL IVPB (admixture device)         05/01/25 1259 IV Every 24 hours (non-standard times) 04/29/25 1016          Antibiotics (From admission, onward)      Start     Stop Route Frequency Ordered    05/02/25 1000  ceFEPIme injection 2 g         -- IV Every 12 hours (non-standard times) 05/02/25 0745    04/30/25 0900  azithromycin (ZITHROMAX) 500 mg in 0.9% NaCl 250 mL IVPB (admixture device)         05/01/25 1259 IV Every 24 hours (non-standard times) 04/29/25 1016          5/1  PMH of DM, renal transplant 2016, CHFpEF, AF/AFL (PVI/PWI/CTI 6/2024), CVA, recurrent PNA who presents with fevers, chills.  States that symptoms are similar to last month when he was admitted for pneumonia. No other pna symptoms despite CXR changes.BC x2 4/29 NGTD. vancomycins discontinued.  respiratory cultures pending.  CT chest ordered -L GGO concerning for pneumonia possible hiatal hernia and possible anterior vertebral osteophyte near mid esophagus.  SLP consulted  and  barium esophogram to assess for hiatal hernia (second episode of pneumonia since March 2025) and recurrent aspiration as a cause of repeat pneumonia. continue cefepime. completed azithromycin. denies hemoptysis this admission. last episode 2 weeks back. resumed prograf 1mg BID. continue prednisone. hold cellcept for now     Patient has a diagnosis of pneumonia. The cause of the pneumonia is suspected to be bacterial in etiology but organism is not known. The pneumonia is stable. The patient has the following signs/symptoms of pneumonia: cough. The patient does not have a current oxygen requirement and the patient does not have a home oxygen requirement. I have reviewed the pertinent imaging. The following cultures have been collected: Blood cultures and Sputum culture The culture results are listed below.     Current antimicrobial regimen consists of the antibiotics listed below. Will monitor patient closely and continue  current treatment plan unchanged.    -Given fevers/chills and recurrent pna, reasonable to continue below abx and deescalate when indicated  -f/u sputum, blood cx    Antibiotics (From admission, onward)      Start     Stop Route Frequency Ordered    05/02/25 1000  ceFEPIme injection 2 g         -- IV Every 12 hours (non-standard times) 05/02/25 0745    04/30/25 0900  azithromycin (ZITHROMAX) 500 mg in 0.9% NaCl 250 mL IVPB (admixture device)         05/01/25 1259 IV Every 24 hours (non-standard times) 04/29/25 1016          Microbiology Results (last 7 days)       Procedure Component Value Units Date/Time    Respiratory Infection Panel (PCR), Nasopharyngeal [[id number]] Collected: 05/02/25 1027    Order Status: Sent Specimen: Nasopharyngeal Swab Updated: 05/02/25 1434    Blood culture x two cultures. Draw prior to antibiotics. [[id number]]  (Normal) Collected: 04/29/25 0758    Order Status: Completed Specimen: Blood from Peripheral, Forearm, Left Updated: 05/01/25 1801     Blood Culture No Growth After 48 Hours    Blood culture x two cultures. Draw prior to antibiotics. [[id number]]  (Normal) Collected: 04/29/25 0758    Order Status: Completed Specimen: Blood from Peripheral, Antecubital, Left Updated: 05/01/25 1801     Blood Culture No Growth After 48 Hours    MRSA Screen by PCR [[id number]]     Order Status: Sent Specimen: Nasal Swab     Culture, Respiratory with Gram Stain [[id number]]     Order Status: Sent Specimen: Respiratory     Culture, Respiratory with Gram Stain [[id number]]     Order Status: Sent Specimen: Respiratory     Influenza A & B by Molecular [[id number]]  (Normal) Collected: 04/29/25 0749    Order Status: Completed Specimen: Nasal Swab Updated: 04/29/25 0836     INFLUENZA A MOLECULAR Negative     INFLUENZA B MOLECULAR  Negative          5/1  BC x2 4/29 NGTD. vancomycins discontinued.  respiratory cultures pending.  CT chest ordered -L GGO concerning for pneumonia possible hiatal hernia and  possible anterior vertebral osteophyte near mid esophagus.  SLP consulted  and consider esophogram to assess for hiatal hernia and recurrent aspiration as a cause of repeat pneumonia. continue cefepime. completed azithromycin. denies hemoptysis this admission. last episode 2 weeks back. resumed prograf 1mg BID. continue prednisone. hold cellcept for now     Patient has a diagnosis of pneumonia. The cause of the pneumonia is suspected to be bacterial in etiology but organism is not known. The pneumonia is stable. The patient has the following signs/symptoms of pneumonia: cough. The patient does not have a current oxygen requirement and the patient does not have a home oxygen requirement. I have reviewed the pertinent imaging. The following cultures have been collected: Blood cultures and Sputum culture The culture results are listed below.     Current antimicrobial regimen consists of the antibiotics listed below. Will monitor patient closely and continue current treatment plan unchanged.    -Given fevers/chills and recurrent pna, reasonable to continue below abx and deescalate when indicated  -f/u sputum, blood cx    Antibiotics (From admission, onward)      Start     Stop Route Frequency Ordered    05/02/25 1000  ceFEPIme injection 2 g         -- IV Every 12 hours (non-standard times) 05/02/25 0745    04/30/25 0900  azithromycin (ZITHROMAX) 500 mg in 0.9% NaCl 250 mL IVPB (admixture device)         05/01/25 1259 IV Every 24 hours (non-standard times) 04/29/25 1016          Antibiotics (From admission, onward)      Start     Stop Route Frequency Ordered    05/02/25 1000  ceFEPIme injection 2 g         -- IV Every 12 hours (non-standard times) 05/02/25 0745    04/30/25 0900  azithromycin (ZITHROMAX) 500 mg in 0.9% NaCl 250 mL IVPB (admixture device)         05/01/25 1259 IV Every 24 hours (non-standard times) 04/29/25 1016

## 2025-05-02 NOTE — HPI
"Mr. Zamorano is a 69M with PMH of kidney transplant 2016, DM2, HFpEF, afib/flutter, previous CVA, presents with fever, chills, cough. Infectious disease consulted for "recurrent pneumonia/ immunosuppressed/ hemoptysis".     He has been afebrile, on room air, no leukocytosis. 4/29 BCX NG. RCX and MRSA nares sent. CT chest showing LINDSAY multilobar consolidations and GGOs, and mod R effusion. Currently on cefepime.   "

## 2025-05-02 NOTE — ASSESSMENT & PLAN NOTE
69M with PMH of kidney transplant 2016, presents with fever, chills, cough, has LINDSAY multilobar consolidations and GGO and mod R effusion on CT chest. He has been afebrile, on room air, no leukocytosis. 4/29 BCX NG. RCX and MRSA nares sent. Currently on cefepime.     Recommendations  Obtain resp infection panel and urine legionella (ordered)  Follow up MRSA nares, resp culture, blood cultures  Continue IV cefepime 2g q12h

## 2025-05-02 NOTE — SUBJECTIVE & OBJECTIVE
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    CKD (chronic kidney disease) stage 4, GFR 15-29 ml/min 07/29/2014    Colon polyps 2014    Depression - situational 07/29/2014    Diabetes type 2 since 2000 07/29/2014    Diabetic neuropathy 07/29/2014    Encounter for blood transfusion     History of cardioversion 10/03/2019    History of hepatitis C, s/p successful Rx w/ SVR24 - 9/2017 07/29/2014    Genotype 1a, treatment naive 10/2014 liver biopsy - grade 1 / stage 1 Completed Harvoni w/ SVR    Hyperlipidemia 07/29/2014    Hypertension     Neuropathy     Organ transplant candidate 07/29/2014    Pancreatitis     S/P cadaveric kidney transplant 11/5/2016. ESRD secondary to HTN/DMII 11/05/2016    Type 2 diabetes mellitus with stage 3a chronic kidney disease, with long-term current use of insulin 07/29/2014       Past Surgical History:   Procedure Laterality Date    ABLATION OF ARRHYTHMOGENIC FOCUS FOR ATRIAL FIBRILLATION N/A 6/11/2024    Procedure: Ablation atrial fibrillation;  Surgeon: Bronson Bowden MD;  Location: [facility] EP LAB;  Service: Cardiology;  Laterality: N/A;  AF, FELICIANO (cx if SR), PVI, RFA, Carto, Gen, GP, 3 Prep    ABLATION, ATRIAL FLUTTER, TYPICAL  6/11/2024    Procedure: Ablation, Atrial Flutter, Typical;  Surgeon: Bronson Bowden MD;  Location: [facility] EP LAB;  Service: Cardiology;;    APPENDECTOMY      BONE MARROW BIOPSY Left 6/26/2024    Procedure: Biopsy-bone marrow;  Surgeon: Bridger Zapata MD;  Location: [facility] ENDO ([address] [place] [address]);  Service: Oncology;  Laterality: Left;  6/24-pt knows to hold aspirin and eliquis as instructed by hem/onc, precall complete-Kpvt    CARDIOVERSION  6/11/2024    Procedure: Cardioversion;  Surgeon: Bronson Bowden MD;  Location: [facility] EP LAB;  Service: Cardiology;;    CATARACT EXTRACTION W/  INTRAOCULAR LENS IMPLANT  Right 3/13/2024    Procedure: EXTRACTION, CATARACT, WITH IOL INSERTION;  Surgeon: Jennifer Palacio MD;  Location: [facility] OR;  Service: Ophthalmology;  Laterality: Right;    CATARACT EXTRACTION W/  INTRAOCULAR LENS IMPLANT Left 4/10/2024    Procedure: EXTRACTION, CATARACT, WITH IOL INSERTION;  Surgeon: Jennifer Palacio MD;  Location: [facility] OR;  Service: Ophthalmology;  Laterality: Left;    COLONOSCOPY N/A 12/21/2020    Procedure: COLONOSCOPY;  Surgeon: Tico Bell MD;  Location: [facility] ENDO (4TH FLR);  Service: Endoscopy;  Laterality: N/A;  ok to hold eliquis per Dr. Valadez, see telephone encounter 11/13/2020-MS  covid test 12/18 [place]    ECHOCARDIOGRAM,TRANSESOPHAGEAL N/A 2/3/2025    Procedure: Transesophageal echo (FELICIANO) intra-procedure log documentation;  Surgeon: Grayson Lin III, MD;  Location: [facility] EP LAB;  Service: Cardiology;  Laterality: N/A;    KIDNEY TRANSPLANT      TRANSESOPHAGEAL ECHOCARDIOGRAPHY N/A 8/26/2019    Procedure: ECHOCARDIOGRAM, TRANSESOPHAGEAL;  Surgeon: [facility] Diagnostic Provider;  Location: [facility] EP LAB;  Service: Cardiology;  Laterality: N/A;    TRANSESOPHAGEAL ECHOCARDIOGRAPHY N/A 6/11/2024    Procedure: ECHOCARDIOGRAM, TRANSESOPHAGEAL;  Surgeon: Grayson Lin III, MD;  Location: [facility] EP LAB;  Service: Cardiology;  Laterality: N/A;    TREATMENT OF CARDIAC ARRHYTHMIA N/A 8/26/2019    Procedure: CARDIOVERSION;  Surgeon: Bronson Bowden MD;  Location: [facility] EP LAB;  Service: Cardiology;  Laterality: N/A;  AF, FELICIANO, DCCV, MAC, GP, 3 PREP    TREATMENT OF CARDIAC ARRHYTHMIA N/A 2/3/2025    Procedure: Cardioversion or Defibrillation;  Surgeon: Bronson Bowden MD;  Location: [facility] EP LAB;  Service: Cardiology;  Laterality: N/A;  AF, FELICIANO, DCCV, MAC, GP, 3 Prep       Review of patient's allergies indicates:   Allergen Reactions    Nifedipine Other (See Comments)     Gingival hyperplasia       Medications:  Facility-Administered Medications Prior to Admission   Medication    epoetin tanya  injection 4,000 Units     Medications Prior to Admission   Medication Sig    atorvastatin (LIPITOR) 80 MG tablet Take 1 tablet (80 mg total) by mouth once daily.    blood sugar diagnostic Strp Use to check blood glucose 1 times daily, to use with insurance preferred meter    blood-glucose meter Misc Use to check blood glucose 1 times daily, to use with insurance preferred meter    blood-glucose sensor (DEXCOM G7 SENSOR) Kayla Change sensor every 10 days.    doxazosin (CARDURA) 4 MG tablet Take 1 tablet (4 mg total) by mouth once daily.    empagliflozin (JARDIANCE) 10 mg tablet Take 1 tablet (10 mg total) by mouth once daily.    ergocalciferol (ERGOCALCIFEROL) 50,000 unit Cap Take 1 capsule (50,000 Units total) by mouth every 7 days.    furosemide (LASIX) 40 MG tablet Take 2 tablets (80 mg total) by mouth daily as needed (for swelling).    gabapentin (NEURONTIN) 300 MG capsule Take 1 capsule by mouth in the morning, 1 capsule midday, and 3 capsules at night.    hydrALAZINE (APRESOLINE) 100 MG tablet Take 1 tablet (100 mg total) by mouth every 8 (eight) hours.    hydroCHLOROthiazide 12.5 MG Tab Take 1 tablet (12.5 mg total) by mouth once daily.    insulin aspart U-100 (NOVOLOG U-100 INSULIN ASPART) 100 unit/mL injection Use in pump, max daily 100 units.    insulin aspart U-100 (NOVOLOG) 100 unit/mL (3 mL) InPn pen Inject 10 units under the skin w/ breakfast, 7 units at lunch and dinner. Scale 180-230+2, 231-280+4, 281-330+6, 331-380+8, >380+10.  Max daily 57 units. (Patient not taking: Reported on 4/22/2025)    insulin glargine U-100, Lantus, (LANTUS SOLOSTAR U-100 INSULIN) 100 unit/mL (3 mL) InPn pen Inject 20 Units into the skin every morning. (Patient not taking: Reported on 4/22/2025)    insulin  cart,aut,G6/7,cntr (OMNIPOD 5 G6-G7 INTRO KT,GEN5,) Crtg Use as directed.    insulin pump cart,auto,BT,G6/7 (OMNIPOD 5 G6-G7 PODS, GEN 5,) Crtg Change every 48 hours.    Lactobacillus rhamnosus GG (CULTURELLE) 10  "billion cell capsule Take 1 capsule by mouth once daily.    lancets 30 gauge Misc Use to check blood glucose 1 times daily, to use with insurance preferred meter    magnesium oxide (MAG-OX) 400 mg (241.3 mg magnesium) tablet Take 1 tablet (400 mg total) by mouth 2 (two) times daily.    meclizine (ANTIVERT) 25 mg tablet Take 1 tablet (25 mg total) by mouth 3 (three) times daily as needed for Dizziness.    [Paused] mycophenolate (CELLCEPT) 250 mg Cap Take 2 capsules (500 mg total) by mouth 2 (two) times daily.    pen needle, diabetic (BD ULTRA-FINE LO PEN NEEDLE) 32 gauge x 5/32" Ndle USE TO ADMINISTER INSULIN 4 TIMES DAILY.    polyethylene glycol (MIRALAX) 17 gram PwPk Take 17 gm (mixed in liquid) by mouth every day.    predniSONE (DELTASONE) 5 MG tablet Take 1 tablet (5 mg total) by mouth once daily.    rivaroxaban (XARELTO) 15 mg Tab Take 1 tablet (15 mg total) by mouth daily with dinner or evening meal.    tacrolimus (PROGRAF) 1 MG Cap Take 1 capsule (1 mg total) by mouth every 12 (twelve) hours.    valsartan (DIOVAN) 320 MG tablet Take 1 tablet (320 mg total) by mouth once daily.     Antibiotics (From admission, onward)      Start     Stop Route Frequency Ordered    05/02/25 1000  ceFEPIme injection 2 g         -- IV Every 12 hours (non-standard times) 05/02/25 0745    04/30/25 0900  azithromycin (ZITHROMAX) 500 mg in 0.9% NaCl 250 mL IVPB (admixture device)         05/01/25 1259 IV Every 24 hours (non-standard times) 04/29/25 1016          Antifungals (From admission, onward)      None          Antivirals (From admission, onward)      None             Immunization History   Administered Date(s) Administered    COVID-19, MRNA, LN-S, PF (Pfizer) (Purple Cap) 04/01/2021, 04/22/2021, 10/30/2021, 10/30/2021    COVID-19, mRNA, LNP-S, PF (Moderna) Ages 12+ 11/08/2023    COVID-19, mRNA, LNP-S, PF, naldo-sucrose, 30 mcg/0.3 mL (Pfizer Ages 12+) 09/19/2024    COVID-19, mRNA, LNP-S, bivalent booster, PF (PFIZER OMICRON) " 2023    Hepatitis A, Adult 2014    Hepatitis B 2014, 10/21/2014    Hepatitis B, Adult 2000, 2000, 2001    Hepatitis B, Dialysis, 3 Dose 2014, 10/21/2014    Influenza 10/01/2017    Influenza (FLUAD) - Quadrivalent - Adjuvanted - PF *Preferred* (65+) 2023    Influenza - Quadrivalent 2019, 2019    Influenza - Quadrivalent - PF *Preferred* (6 months and older) 09/15/2017, 2018, 2018, 2020, 2020, 10/10/2022    Influenza - Trivalent - Afluria, Fluzone MDV 10/01/2015, 2016, 2016, 10/09/2021    Influenza - Trivalent - Fluad - Adjuvanted - PF (65 years and older 2024    Influenza - Trivalent - Flucelvax - PF 08/10/2014, 08/10/2014    Influenza Split 10/09/2021    Pneumococcal Conjugate - 13 Valent 2014    Pneumococcal Conjugate - 20 Valent 2023    Pneumococcal Polysaccharide - 23 Valent 2014    RSVpreF (Arexvy) 2024    Tdap 2000, 10/21/2014, 09/15/2017    Zoster 2014    Zoster Recombinant 2018, 2018, 10/30/2019       Family History       Problem Relation (Age of Onset)    Cancer Brother    Diabetes Mother    Heart disease Mother, Father    Hypertension Mother, Brother          Social History     Socioeconomic History    Marital status:    Occupational History     [employer]er: [employer]   Tobacco Use    Smoking status: Former     Current packs/day: 0.00     Average packs/day: 0.5 packs/day for 32.0 years (16.0 ttl pk-yrs)     Types: Cigarettes     Start date: 1984     Quit date: 2016     Years since quittin.4    Smokeless tobacco: Never   Substance and Sexual Activity    Alcohol use: Yes     Comment: Pt reports occasional beers on Sundays. Pt reports drinking daily prior to ESRD diagnosis.    Drug use: No    Sexual activity: Yes     Partners: Female   Social History Narrative     for 14 years     for 38 years    2 children ages  41, 42     Social Drivers of Health     Financial Resource Strain: Low Risk  (5/1/2025)    Overall Financial Resource Strain (CARDIA)     Difficulty of Paying Living Expenses: Not hard at all   Food Insecurity: No Food Insecurity (5/1/2025)    Hunger Vital Sign     Worried About Running Out of Food in the Last Year: Never true     Ran Out of Food in the Last Year: Never true   Transportation Needs: No Transportation Needs (5/1/2025)    PRAPARE - Transportation     Lack of Transportation (Medical): No     Lack of Transportation (Non-Medical): No   Physical Activity: Insufficiently Active (5/1/2025)    Exercise Vital Sign     Days of Exercise per Week: 7 days     Minutes of Exercise per Session: 10 min   Stress: No Stress Concern Present (5/1/2025)    [employer] - Occupational Stress Questionnaire     Feeling of Stress : Not at all   Housing Stability: Low Risk  (5/1/2025)    Housing Stability Vital Sign     Unable to Pay for Housing in the Last Year: No     Homeless in the Last Year: No     Review of Systems   Constitutional:  Negative for chills and fever.   Respiratory:  Positive for cough. Negative for shortness of breath.    Cardiovascular:  Negative for chest pain.   Gastrointestinal:  Negative for abdominal pain, constipation, diarrhea, nausea and vomiting.   Musculoskeletal:  Negative for arthralgias and myalgias.   Skin:  Negative for rash.   Neurological:  Negative for weakness and headaches.     Objective:     Vital Signs (Most Recent):  Temp: 98.1 °F (36.7 °C) (05/02/25 1109)  Pulse: 79 (05/02/25 1109)  Resp: 20 (05/02/25 1109)  BP: (!) 183/84 (05/02/25 1109)  SpO2: 97 % (05/02/25 1109) Vital Signs (24h Range):  Temp:  [97.7 °F (36.5 °C)-98.4 °F (36.9 °C)] 98.1 °F (36.7 °C)  Pulse:  [] 79  Resp:  [18-20] 20  SpO2:  [94 %-98 %] 97 %  BP: (164-194)/() 183/84     Weight: 90.7 kg (200 lb)  Body mass index is 26.39 kg/m².    Estimated Creatinine Clearance: 33.8 mL/min  (A) (based on SCr of 2.4 mg/dL (H)).     Physical Exam  Vitals reviewed.   Constitutional:       General: He is not in acute distress.     Appearance: Normal appearance. He is not ill-appearing.   HENT:      Head: Normocephalic and atraumatic.   Eyes:      Extraocular Movements: Extraocular movements intact.      Conjunctiva/sclera: Conjunctivae normal.   Cardiovascular:      Rate and Rhythm: Normal rate and regular rhythm.      Heart sounds: No murmur heard.  Pulmonary:      Effort: Pulmonary effort is normal. No respiratory distress.      Breath sounds: Normal breath sounds.   Abdominal:      General: Abdomen is flat. Bowel sounds are normal.      Palpations: Abdomen is soft.      Tenderness: There is no abdominal tenderness.   Musculoskeletal:      Cervical back: Normal range of motion.   Skin:     General: Skin is warm and dry.   Neurological:      General: No focal deficit present.      Mental Status: He is alert and oriented to person, place, and time.   Psychiatric:         Mood and Affect: Mood normal.         Behavior: Behavior normal.         Thought Content: Thought content normal.          Significant Labs: All pertinent labs within the past 24 hours have been reviewed.  Recent Lab Results  (Last 5 results in the past 24 hours)        05/02/25  1139   05/02/25  0755   05/02/25  0215   05/01/25  2115   05/01/25  1626        Anion Gap     10           Baso #     0.02           Basophil %     0.4           BUN     48           Calcium     8.1           Chloride     104           CO2     21           Creatinine     2.4           eGFR     29  Comment: Estimated GFR calculated using the CKD-EPI creatinine (2021) equation.           Eos #     0.06           Eos %     1.1           Glucose     158           Gran # (ANC)     3.75           Hematocrit     29.0           Hemoglobin     8.5           Immature Grans (Abs)     0.02  Comment: Mild elevation in immature granulocytes is non specific and can be seen in a  variety of conditions including stress response, acute inflammation, trauma and pregnancy. Correlation with other laboratory and clinical findings is essential.           Immature Granulocytes     0.4           Lymph #     1.00           Lymph %     17.5           Magnesium      2.3           MCH     22.7           MCHC     29.3           MCV     78           Mono #     0.86           Mono %     15.1           MPV       Comment: Result Not Available           Neut %     65.5           nRBC     0           Phosphorus Level     2.8           Platelet Count     107           POCT Glucose   139     168   117       Potassium     3.5           RBC     3.74           RDW     19.9           Sodium     135           Tacrolimus Lvl     4.3  Comment: Testing performed by a chemiluminescent microparticle   immunoassay on the 1-4 All i System.    CAUTION: No firm therapeutic range exists for tacrolimus in whole blood. The complexity of the clinical state, individual differences in sensitivity to immunosuppressive and nephrotoxic effects of tacrolimus, co-administration of other immunosuppressants, type of transplant, time post-transplant and a number of other factors contribute to different requirements for optimal blood levels of tacrolimus. Therefore, individual tacrolimus values cannot be used as the sole indicator for making changes in treatment regimen and each patient should be thoroughly evaluated clinically before changes in treatment regimens are made. Each user must establish his or her own ranges based on clinical experience.  Therapeutic ranges vary according to the commercial test used, and therefore should be established for each commercial test. Values obtained with different assay methods cannot be used interchangeably due to differences in assay methods and cross-reactivity with metabolites, nor should correction factors be applied. Therefore, consistent use of one assay for individual patients is  recommended.             Vancomycin, Random 19.2               WBC     5.71                                  Significant Imaging: I have reviewed all pertinent imaging results/findings within the past 24 hours.

## 2025-05-02 NOTE — ASSESSMENT & PLAN NOTE
Anemia is likely due to chronic disease due to Chronic Kidney Disease. Most recent hemoglobin and hematocrit are listed below.  Recent Labs     04/30/25  0619 05/01/25  0233 05/02/25  0215   HGB 8.7* 8.3* 8.5*   HCT 29.9* 28.0* 29.0*     Plan  - Monitor serial CBC: Daily  - Transfuse PRBC if patient becomes hemodynamically unstable, symptomatic or H/H drops below 7/21.  - Patient has not received any PRBC transfusions to date  - Patient's anemia is currently stable

## 2025-05-02 NOTE — PROGRESS NOTES
Name: Ravi Zamorano  Medical Record Number: [id number]  Date of Service: 05/02/2025  Note By: Honey Marley MD    RENAL TRANSPLANT PROGRESS NOTE    ORGAN: RIGHT KIDNEY  Donor Type: donation after brain death  PHS Increased Risk: yes  Cold Ischemia: 314 mins  Induction Medications: thymoglobulin    Reason for Follow-up: Reassessment of kidney transplant recipient and management of immunosuppression.    Summary: Mr. Zamorano is a 69 y.o. male with history of ESRD presumably 2/2 HTN/T2DM s/p DDRT (11/5/2016) on tacrolimus/pred, who was admitted on 4/29/2025 for pneumonia.    Interval History: Patient reports feeling well today. Remains inpatient for work up of his recurrent PNA. Underwent barium swallow study today. Denies lightheadedness, CP, SOB, swelling. Reports urinating well.     PMHx:  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    CKD (chronic kidney disease) stage 4, GFR 15-29 ml/min 07/29/2014    Colon polyps 2014    Depression - situational 07/29/2014    Diabetes type 2 since 2000 07/29/2014    Diabetic neuropathy 07/29/2014    Encounter for blood transfusion     History of cardioversion 10/03/2019    History of hepatitis C, s/p successful Rx w/ SVR24 - 9/2017 07/29/2014    Genotype 1a, treatment naive 10/2014 liver biopsy - grade 1 / stage 1 Completed Harvoni w/ SVR    Hyperlipidemia 07/29/2014    Hypertension     Neuropathy     Organ transplant candidate 07/29/2014    Pancreatitis     S/P cadaveric kidney transplant 11/5/2016. ESRD secondary to HTN/DMII 11/05/2016    Type 2 diabetes mellitus with stage 3a chronic kidney disease, with long-term current use of insulin 07/29/2014     Medications:   Scheduled Meds:   atorvastatin  80 mg Oral Daily    ceFEPime IV (PEDS and ADULTS)  1 g Intravenous Q12H    doxazosin  4 mg Oral Daily    empagliflozin  10 mg  Oral Daily    gabapentin  300 mg Oral Daily    gabapentin  300 mg Oral After lunch    gabapentin  300 mg Oral QHS    hydrALAZINE  100 mg Oral Q8H    hydroCHLOROthiazide  12.5 mg Oral Daily    insulin glargine U-100  15 Units Subcutaneous QHS    Lactobacillus rhamnosus GG  1 capsule Oral Daily    nitroGLYCERIN 2% TD oint  2 inch Topical (Top) Q8H    polyethylene glycol  17 g Oral Daily    predniSONE  5 mg Oral Daily    rivaroxaban  15 mg Oral Daily with dinner    tacrolimus  1 mg Oral BID    valsartan  320 mg Oral Daily     Continuous Infusions:    PRN Meds:.  Current Facility-Administered Medications:     acetaminophen, 650 mg, Oral, Q6H PRN    albuterol-ipratropium, 3 mL, Nebulization, Q6H PRN    dextrose 50%, 12.5 g, Intravenous, PRN    dextrose 50%, 25 g, Intravenous, PRN    glucagon (human recombinant), 1 mg, Intramuscular, PRN    glucose, 16 g, Oral, PRN    glucose, 24 g, Oral, PRN    hydrALAZINE, 15 mg, Intravenous, Q6H PRN    insulin aspart U-100, 0-10 Units, Subcutaneous, QID (AC + HS) PRN    meclizine, 25 mg, Oral, TID PRN    melatonin, 6 mg, Oral, Nightly PRN    naloxone, 0.02 mg, Intravenous, PRN    ondansetron, 8 mg, Oral, Q8H PRN    prochlorperazine, 5 mg, Intravenous, Q6H PRN    sodium chloride 0.9%, 10 mL, Intravenous, Q8H PRN    Review of Systems:   Reviewed. Pertinent positives and negatives included above.    Physical Exam:  Vitals:  Vitals:    05/02/25 0556   BP: (!) 179/82   Pulse: 78   Resp: 18   Temp: 98.4 °F (36.9 °C)     Temp:  [97.7 °F (36.5 °C)-98.4 °F (36.9 °C)] 98.4 °F (36.9 °C)  Pulse:  [] 78  Resp:  [18] 18  SpO2:  [94 %-98 %] 96 %  BP: (170-194)/() 179/82    Exam  General: alert, conversational, NAD, sitting up on side of bed  HEENT: moist mucus membranes  Respiratory: CTAB, breathing comfortably on RA  Cardiovacular: RRR, no murmur appreciated  Gastrointestinal: soft, nontender  Renal allograft exam: No tenderness, no bruits   Extremities: no edema  Skin: warm and dry      Labs:  Lab Results   Component Value Date    WBC 5.71 05/02/2025    HGB 8.5 (L) 05/02/2025    HCT 29.0 (L) 05/02/2025     (L) 05/02/2025    K 3.5 05/02/2025     05/02/2025    CO2 21 (L) 05/02/2025    BUN 48 (H) 05/02/2025    CREATININE 2.4 (H) 05/02/2025    EGFRNONAA 44.9 (A) 07/19/2022    CALCIUM 8.1 (L) 05/02/2025    PHOS 2.8 05/02/2025    MG 2.3 05/02/2025    ALBUMIN 2.8 (L) 04/29/2025    AST 14 04/29/2025    ALT <5 (L) 04/29/2025     Imaging Studies:  CXR 4/29/25:  Heart is enlarged, but allowing for magnification of the cardiomediastinal silhouette related both to projection and to a poorer inspiratory depth level than on the prior exam, I doubt that the heart has changed appreciably in size since that time. However, there has been a detrimental interval change in the appearance of the chest, as the current examination demonstrates pulmonary parenchymal opacity in the right lower lung zone consistent with the development of airspace consolidation since the prior exam. Given the patient's clinical history, this may represent acute pneumonia. The left lung and the remainder of the right lung are clear. No pleural fluid of any substantial volume is seen on either side. No pneumothorax. ?    Assessment/Plan:  69 y.o. male with ESRD presumably 2/2 HTN/T2DM s/p DDRT (11/5/2016) on tacrolimus/pred, who is admitted for RLL pneumonia.     ESRD 2/2 HTN/T2DM s/p cadaveric kidney transplant (11/5/16)  CKD4  - baseline Cr ~2's with eGFR ~20s  - kidney function has been slowly worsening over the last couple of years, but currently stable this hospitalization  - follow strict I/Os, daily weights  - avoid nephrotoxic agents like NSAIDs, IV contrast, aminoglycoside-containing Abx  - renally dose medications    Immunosuppression Management  - tac level still low, but improving now that his prograf has been restarted  - continue Prograf 1mg BID; goal trough level is 5-7ng/ml  - continue prednisone 5mg  daily    Chronic anemia  - Hb currently ~8-9   - Tsat 30% (02/2025)  - last known ferritin in system 800s in 04/2024  - patient receives EPO injections outpatient  - continue to monitor    HTN  - BP has been above goal, but antihypertensive regimen is difficult given his difficulties with some antihypertensives previously  - gingival hyperplasia with nifedipine, bradycardia (down to 20s) with coreg  - primary team increased doxazosin to 8mg today  - otherwise currently on hydralazine 100mg TID, HCTZ 12.5mg, and valsartan 320mg  - HCTZ likely not having much effect on his BP at this dose given his CKD4  - can discontinue HCTZ and start torsemide 40mg daily  - if BP still uncontrolled, consider minoxidil      Honey Marley MD  [facility] Nephrology Fellow, PGY-4  Kidney Transplant Medicine

## 2025-05-02 NOTE — ASSESSMENT & PLAN NOTE
Patient's most recent potassium results are listed below.   Recent Labs     04/30/25  0619 05/01/25  0233 05/02/25  0215   K 3.9 3.5 3.5     Plan  - Replete potassium per protocol  - Monitor potassium Daily  - Patient's hypokalemia is stable

## 2025-05-02 NOTE — ASSESSMENT & PLAN NOTE
Creatine stable for now. BMP reviewed- noted Estimated Creatinine Clearance: 33.8 mL/min (A) (based on SCr of 2.4 mg/dL (H)). according to latest data. Based on current GFR, CKD stage is stage 3 - GFR 30-59.  Monitor UOP and serial BMP and adjust therapy as needed. Renally dose meds. Avoid nephrotoxic medications and procedures.

## 2025-05-02 NOTE — PROGRESS NOTES
Pharmacist Renal Dose Adjustment Note    Ravi Zamorano is a 69 y.o. male being treated with the medication cefepime    Patient Data:    Vital Signs (Most Recent):  Temp: 98.3 °F (36.8 °C) (05/02/25 0744)  Pulse: 91 (05/02/25 0744)  Resp: 18 (05/02/25 0744)  BP: (!) 164/80 (05/02/25 0744)  SpO2: 97 % (05/02/25 0744) Vital Signs (72h Range):  Temp:  [97.6 °F (36.4 °C)-98.6 °F (37 °C)]   Pulse:  []   Resp:  [16-20]   BP: (151-205)/()   SpO2:  [93 %-100 %]      Recent Labs   Lab 05/01/25  0233 05/01/25  1159 05/02/25  0215   CREATININE 2.6* 2.5* 2.4*     Serum creatinine: 2.4 mg/dL (H) 05/02/25 0215  Estimated creatinine clearance: 33.8 mL/min (A)    Medication:cefepime 1 g q12h will be changed to 2 g q12h for CrCl 30-60 ml/min.      Pharmacist's Name: Marissa Fam  Pharmacist's Extension: [phone]

## 2025-05-03 LAB
ABSOLUTE EOSINOPHIL (OHS): 0.08 K/UL
ABSOLUTE MONOCYTE (OHS): 0.72 K/UL (ref 0.3–1)
ABSOLUTE NEUTROPHIL COUNT (OHS): 2.13 K/UL (ref 1.8–7.7)
ALBUMIN SERPL BCP-MCNC: 2.6 G/DL (ref 3.5–5.2)
ANION GAP (OHS): 8 MMOL/L (ref 8–16)
ANION GAP (OHS): 9 MMOL/L (ref 8–16)
BASOPHILS # BLD AUTO: 0.02 K/UL
BASOPHILS NFR BLD AUTO: 0.5 %
BUN SERPL-MCNC: 43 MG/DL (ref 8–23)
BUN SERPL-MCNC: 45 MG/DL (ref 8–23)
CALCIUM SERPL-MCNC: 8 MG/DL (ref 8.7–10.5)
CALCIUM SERPL-MCNC: 8.5 MG/DL (ref 8.7–10.5)
CHLORIDE SERPL-SCNC: 106 MMOL/L (ref 95–110)
CHLORIDE SERPL-SCNC: 106 MMOL/L (ref 95–110)
CO2 SERPL-SCNC: 20 MMOL/L (ref 23–29)
CO2 SERPL-SCNC: 23 MMOL/L (ref 23–29)
CREAT SERPL-MCNC: 2.6 MG/DL (ref 0.5–1.4)
CREAT SERPL-MCNC: 2.8 MG/DL (ref 0.5–1.4)
ERYTHROCYTE [DISTWIDTH] IN BLOOD BY AUTOMATED COUNT: 19.7 % (ref 11.5–14.5)
GFR SERPLBLD CREATININE-BSD FMLA CKD-EPI: 24 ML/MIN/1.73/M2
GFR SERPLBLD CREATININE-BSD FMLA CKD-EPI: 26 ML/MIN/1.73/M2
GLUCOSE SERPL-MCNC: 109 MG/DL (ref 70–110)
GLUCOSE SERPL-MCNC: 130 MG/DL (ref 70–110)
HCT VFR BLD AUTO: 29.1 % (ref 40–54)
HGB BLD-MCNC: 8.4 GM/DL (ref 14–18)
IMM GRANULOCYTES # BLD AUTO: 0.02 K/UL (ref 0–0.04)
IMM GRANULOCYTES NFR BLD AUTO: 0.5 % (ref 0–0.5)
LYMPHOCYTES # BLD AUTO: 1.11 K/UL (ref 1–4.8)
MAGNESIUM SERPL-MCNC: 2.1 MG/DL (ref 1.6–2.6)
MCH RBC QN AUTO: 22.5 PG (ref 27–31)
MCHC RBC AUTO-ENTMCNC: 28.9 G/DL (ref 32–36)
MCV RBC AUTO: 78 FL (ref 82–98)
NUCLEATED RBC (/100WBC) (OHS): 0 /100 WBC
PHOSPHATE SERPL-MCNC: 3.4 MG/DL (ref 2.7–4.5)
PHOSPHATE SERPL-MCNC: 3.6 MG/DL (ref 2.7–4.5)
PLATELET # BLD AUTO: 114 K/UL (ref 150–450)
PMV BLD AUTO: ABNORMAL FL
POCT GLUCOSE: 100 MG/DL (ref 70–110)
POCT GLUCOSE: 115 MG/DL (ref 70–110)
POCT GLUCOSE: 148 MG/DL (ref 70–110)
POCT GLUCOSE: 156 MG/DL (ref 70–110)
POTASSIUM SERPL-SCNC: 3.2 MMOL/L (ref 3.5–5.1)
POTASSIUM SERPL-SCNC: 4.1 MMOL/L (ref 3.5–5.1)
RBC # BLD AUTO: 3.73 M/UL (ref 4.6–6.2)
RELATIVE EOSINOPHIL (OHS): 2 %
RELATIVE LYMPHOCYTE (OHS): 27.2 % (ref 18–48)
RELATIVE MONOCYTE (OHS): 17.6 % (ref 4–15)
RELATIVE NEUTROPHIL (OHS): 52.2 % (ref 38–73)
SODIUM SERPL-SCNC: 135 MMOL/L (ref 136–145)
SODIUM SERPL-SCNC: 137 MMOL/L (ref 136–145)
TACROLIMUS BLD-MCNC: 4.4 NG/ML (ref 5–15)
TROPONIN I SERPL HS-MCNC: 68 NG/L
TROPONIN I SERPL HS-MCNC: 78 NG/L
WBC # BLD AUTO: 4.08 K/UL (ref 3.9–12.7)

## 2025-05-03 PROCEDURE — 63600175 PHARM REV CODE 636 W HCPCS: Mod: HCNC | Performed by: STUDENT IN AN ORGANIZED HEALTH CARE EDUCATION/TRAINING PROGRAM

## 2025-05-03 PROCEDURE — 85025 COMPLETE CBC W/AUTO DIFF WBC: CPT | Mod: HCNC | Performed by: STUDENT IN AN ORGANIZED HEALTH CARE EDUCATION/TRAINING PROGRAM

## 2025-05-03 PROCEDURE — 80048 BASIC METABOLIC PNL TOTAL CA: CPT | Mod: HCNC | Performed by: STUDENT IN AN ORGANIZED HEALTH CARE EDUCATION/TRAINING PROGRAM

## 2025-05-03 PROCEDURE — 94761 N-INVAS EAR/PLS OXIMETRY MLT: CPT | Mod: HCNC

## 2025-05-03 PROCEDURE — 21400001 HC TELEMETRY ROOM: Mod: HCNC

## 2025-05-03 PROCEDURE — 84100 ASSAY OF PHOSPHORUS: CPT | Mod: HCNC | Performed by: STUDENT IN AN ORGANIZED HEALTH CARE EDUCATION/TRAINING PROGRAM

## 2025-05-03 PROCEDURE — 93010 ELECTROCARDIOGRAM REPORT: CPT | Mod: HCNC,,, | Performed by: INTERNAL MEDICINE

## 2025-05-03 PROCEDURE — 80197 ASSAY OF TACROLIMUS: CPT | Mod: HCNC | Performed by: STUDENT IN AN ORGANIZED HEALTH CARE EDUCATION/TRAINING PROGRAM

## 2025-05-03 PROCEDURE — 63600175 PHARM REV CODE 636 W HCPCS: Mod: HCNC | Performed by: HOSPITALIST

## 2025-05-03 PROCEDURE — 25000003 PHARM REV CODE 250: Mod: HCNC

## 2025-05-03 PROCEDURE — 36415 COLL VENOUS BLD VENIPUNCTURE: CPT | Mod: HCNC | Performed by: HOSPITALIST

## 2025-05-03 PROCEDURE — 83735 ASSAY OF MAGNESIUM: CPT | Mod: HCNC | Performed by: STUDENT IN AN ORGANIZED HEALTH CARE EDUCATION/TRAINING PROGRAM

## 2025-05-03 PROCEDURE — 80048 BASIC METABOLIC PNL TOTAL CA: CPT | Mod: HCNC | Performed by: HOSPITALIST

## 2025-05-03 PROCEDURE — 36415 COLL VENOUS BLD VENIPUNCTURE: CPT | Mod: HCNC | Performed by: STUDENT IN AN ORGANIZED HEALTH CARE EDUCATION/TRAINING PROGRAM

## 2025-05-03 PROCEDURE — 93005 ELECTROCARDIOGRAM TRACING: CPT | Mod: HCNC

## 2025-05-03 PROCEDURE — 25000003 PHARM REV CODE 250: Mod: HCNC | Performed by: HOSPITALIST

## 2025-05-03 PROCEDURE — 25000003 PHARM REV CODE 250: Mod: HCNC | Performed by: STUDENT IN AN ORGANIZED HEALTH CARE EDUCATION/TRAINING PROGRAM

## 2025-05-03 PROCEDURE — 63600175 PHARM REV CODE 636 W HCPCS: Mod: HCNC | Performed by: INTERNAL MEDICINE

## 2025-05-03 PROCEDURE — 84484 ASSAY OF TROPONIN QUANT: CPT | Mod: HCNC | Performed by: HOSPITALIST

## 2025-05-03 RX ORDER — POTASSIUM CHLORIDE 20 MEQ/1
40 TABLET, EXTENDED RELEASE ORAL ONCE
Status: COMPLETED | OUTPATIENT
Start: 2025-05-03 | End: 2025-05-03

## 2025-05-03 RX ADMIN — TORSEMIDE 40 MG: 20 TABLET ORAL at 08:05

## 2025-05-03 RX ADMIN — ATORVASTATIN CALCIUM 80 MG: 40 TABLET, FILM COATED ORAL at 08:05

## 2025-05-03 RX ADMIN — HYDRALAZINE HYDROCHLORIDE 100 MG: 50 TABLET ORAL at 01:05

## 2025-05-03 RX ADMIN — HYDRALAZINE HYDROCHLORIDE 100 MG: 50 TABLET ORAL at 05:05

## 2025-05-03 RX ADMIN — CEFEPIME 2 G: 2 INJECTION, POWDER, FOR SOLUTION INTRAVENOUS at 11:05

## 2025-05-03 RX ADMIN — PREDNISONE 5 MG: 5 TABLET ORAL at 08:05

## 2025-05-03 RX ADMIN — Medication 1 CAPSULE: at 08:05

## 2025-05-03 RX ADMIN — TACROLIMUS 1 MG: 1 CAPSULE ORAL at 05:05

## 2025-05-03 RX ADMIN — GABAPENTIN 300 MG: 300 CAPSULE ORAL at 01:05

## 2025-05-03 RX ADMIN — ACETAMINOPHEN 1000 MG: 500 TABLET ORAL at 08:05

## 2025-05-03 RX ADMIN — EMPAGLIFLOZIN 10 MG: 10 TABLET, FILM COATED ORAL at 08:05

## 2025-05-03 RX ADMIN — TACROLIMUS 1 MG: 1 CAPSULE ORAL at 08:05

## 2025-05-03 RX ADMIN — GABAPENTIN 300 MG: 300 CAPSULE ORAL at 09:05

## 2025-05-03 RX ADMIN — RIVAROXABAN 15 MG: 15 TABLET, FILM COATED ORAL at 05:05

## 2025-05-03 RX ADMIN — HYDRALAZINE HYDROCHLORIDE 100 MG: 50 TABLET ORAL at 09:05

## 2025-05-03 RX ADMIN — CEFEPIME 2 G: 2 INJECTION, POWDER, FOR SOLUTION INTRAVENOUS at 10:05

## 2025-05-03 RX ADMIN — HYDRALAZINE HYDROCHLORIDE 15 MG: 20 INJECTION, SOLUTION INTRAMUSCULAR; INTRAVENOUS at 04:05

## 2025-05-03 RX ADMIN — ACETAMINOPHEN 1000 MG: 500 TABLET ORAL at 09:05

## 2025-05-03 RX ADMIN — DOXAZOSIN 8 MG: 4 TABLET ORAL at 08:05

## 2025-05-03 RX ADMIN — GABAPENTIN 300 MG: 300 CAPSULE ORAL at 08:05

## 2025-05-03 RX ADMIN — VALSARTAN 320 MG: 160 TABLET, FILM COATED ORAL at 08:05

## 2025-05-03 RX ADMIN — POTASSIUM CHLORIDE 40 MEQ: 1500 TABLET, EXTENDED RELEASE ORAL at 08:05

## 2025-05-03 NOTE — ASSESSMENT & PLAN NOTE
Creatine stable for now. BMP reviewed- noted Estimated Creatinine Clearance: 30 mL/min (A) (based on SCr of 2.7 mg/dL (H)). according to latest data. Based on current GFR, CKD stage is stage 3 - GFR 30-59.  Monitor UOP and serial BMP and adjust therapy as needed. Renally dose meds. Avoid nephrotoxic medications and procedures.    Recent Labs   Lab 05/03/25  0237 05/03/25  1627 05/04/25  0233   BUN 45* 43* 43*   CREATININE 2.6* 2.8* 2.7*       I & O     Intake/Output Summary (Last 24 hours) at 5/4/2025 1232  Last data filed at 5/3/2025 1300  Gross per 24 hour   Intake 240 ml   Output --   Net 240 ml      5/3 KTM f/u - Cr elevated from his baseline - suspicion for failing allograft and approaching need for dialysis .  hold valsartan for now. monitor BP

## 2025-05-03 NOTE — PROGRESS NOTES
Kidney Transplant Medicine Service Chart Check Note:    Medications:   acetaminophen  1,000 mg Oral Q12H    atorvastatin  80 mg Oral Daily    ceFEPime IV (PEDS and ADULTS)  2 g Intravenous Q12H    doxazosin  8 mg Oral Daily    empagliflozin  10 mg Oral Daily    gabapentin  300 mg Oral Daily    gabapentin  300 mg Oral After lunch    gabapentin  300 mg Oral QHS    hydrALAZINE  100 mg Oral Q8H    insulin glargine U-100  15 Units Subcutaneous QHS    Lactobacillus rhamnosus GG  1 capsule Oral Daily    polyethylene glycol  17 g Oral Daily    predniSONE  5 mg Oral Daily    rivaroxaban  15 mg Oral Daily with dinner    tacrolimus  1 mg Oral BID    torsemide  40 mg Oral Daily    valsartan  320 mg Oral Daily       Vitals:  Temp:  [97.6 °F (36.4 °C)-98.7 °F (37.1 °C)] 98.1 °F (36.7 °C)  Pulse:  [] 105  Resp:  [18] 18  SpO2:  [95 %-100 %] 97 %  BP: (132-225)/() 132/67    I/Os:  I/O last 3 completed shifts:  In: 480 [P.O.:480]  Out: 450 [Urine:450]    Labs reviewed      Recommendations:  69 yr old AAM with ESRD secondary to DM s/p a  donor kidney transplant (bcr of 2-2.2) on 2016, HF (EF of 50-55%, grade III diastolic dysfunction), prior CVA's who presents with fever, chills cough found to have bilateral multilobar consolidation on CT. Resp panel negative.    - graft function mildly elevated from his baseline. Cont Torsemide 40 mg daily for now.     - consider holding valsartan for now   - UA unremarkable   - acceptable FK. Is a 9 hr trough.    - cont to hold Cellcept     Eulalia Giron DO  Renal Transplant Attending

## 2025-05-03 NOTE — PROGRESS NOTES
[facility] Medicine  Progress Note    Patient Name: Ravi Zamorano  MRN: [id number]  Patient Class: IP- Inpatient   Admission Date: 4/29/2025  Length of Stay: 4 days  Attending Physician: Cliff Guaman MD  Primary Care Provider: Mima Mack MD        Subjective     Principal Problem:Community acquired bacterial pneumonia        HPI:  This is a 69-year-old male with a history of DM, renal transplant 2016, CHFpEF, AF/AFL (PVI/PWI/CTI 6/2024), CVA, recurrent PNA who presents with fevers, chills.  States that symptoms are similar to last month when he was admitted for pneumonia.  Was in his usual state of health until last night when he started experiencing intermittent fevers, chills.  Wife states she also noticed that he has had increased intermittent cough over the past day or so.  Patient did not notice this.  Denies any production.  Also denies any shortness for breath, wheezing, chest pain, abdominal pain, diarrhea, nausea, vomiting, dysuria.  Compliant with all medications.  Trace lower extremity swelling is chronic.    ED course:  On room air.  Lab work significant for Cr 2.7, same as previous. CXR showed development of RLL consolidation.  Received IV vancomycin, azithromycin, Zosyn.  Septic workup obtained.    Overview/Hospital Course:  Patient admitted due to fever, fatigue and chills at home. He has not had any cough or sob at home. He was recently treated for PNA at the end of march and was feeling a lot better since then, until he had a sudden onset of symptoms. He did describe some productive cough for several morning and the mucus he coughed op had rust colored blood as well as bright red blood sometimes. Theses episodes only happened shortly after wakening in the morning and didn't persist during the day, no associated sob or other symptoms during those times. Denies nasal drainage, congestion, or nose bleeds.       Started on broad spectrum and feeling a lot better.  Still on RA and no cough, wheezes or sob     CT chest ordered to get a better look at the consolidations and possible hemoptysis, will considered pulmonology and/or ID consulted to help direct care    5/1  PMH of DM, renal transplant 2016, CHFpEF, AF/AFL (PVI/PWI/CTI 6/2024), CVA, recurrent PNA who presents with fevers, chills.  States that symptoms are similar to last month when he was admitted for pneumonia. No other pna symptoms despite CXR changes.BC x2 4/29 NGTD. vancomycins discontinued.  respiratory cultures pending.  CT chest ordered -L GGO concerning for pneumonia possible hiatal hernia and possible anterior vertebral osteophyte near mid esophagus.  SLP consulted. MBSS and barium esophogram to assess for hiatal hernia (second episode of pneumonia since March 2025) and recurrent aspiration as a cause of repeat pneumonia. continue cefepime. completed azithromycin. denies hemoptysis this admission. last episode 2 weeks back. resumed prograf 1mg BID. continue prednisone. hold cellcept for now   5/2 s/p FEES. SLP recs regular diet/thin liquids CT chest -Bilateral multilobar consolidations and ground-glass opacities associated with bronchial thickening suggestive of infectious/inflammatory process. Right moderate and left small pleural effusions.  ID consulted for recurrent pneumonia/ immunosuppressed/ hemoptysis.  SBP 160s-180s. coreg previously due to bradycardia during hospitalization 2/202. discontinued Spironolactone 25 mg daily -  3/2024 due to getting LH and dizzy . procardia listed as allergy. Doxazosin increased to 8mg daiy. . HCTZ discontinued. started on torsemide 40mg daily. ID eval  -  resp infection panel and urine legionella  MRSA nares, resp culture  5/3 SBP 200s overnight. started on IV hydralzine. received IV hydralazine 15mg x 2 doses. BP  132/67 . K replaced. RIP negative. EKG -Atrial fibrillation with premature ventricular or aberrantly conducted complexe. Low voltage QRS. KTM f/u - Cr  elevated from his baseline - suspicion for failing allograft and approaching need for dialysis .  hold valsartan for now. monitor BP      Review of Systems:   Pain scale:   Constitutional:  fever,  chills, headache, vision loss, hearing loss, weight loss, Generalized weakness, falls, loss of smell, loss of taste, poor appetite,  sore throat  Respiratory: cough, shortness of breath.   Cardiovascular: chest pain, dizziness, palpitations, orthopnea, swelling of feet, syncope  Gastrointestinal: nausea, vomiting, abdominal pain, diarrhea, black stool,  blood in stool, change in bowel habits, constipation  Genitourinary: hematuria, dysuria, urgency, frequency  Integument/Breast: rash,  pruritis  Hematologic/Lymphatic: easy bruising, lymphadenopathy  Musculoskeletal: arthralgias , myalgias, back pain, neck pain, knee pain  Neurological: confusion, seizures, tremors, slurred speech  Behavioral/Psych:  depression, anxiety, auditory or visual hallucinations     OBJECTIVE:     Physical Exam:  Body mass index is 26.39 kg/m².    Constitutional: Appears well-developed and well-nourished.   Head: Normocephalic and atraumatic.   Neck: Normal range of motion. Neck supple.   Cardiovascular: Normal heart rate.  Regular heart rhythm.  Pulmonary/Chest: Effort normal.   Abdominal: No distension.  No tenderness  Musculoskeletal: Normal range of motion. No edema.   Neurological: Alert and oriented to person, place, and time.   Skin: Skin is warm and dry.   Psychiatric: Normal mood and affect. Behavior is normal.                  Vital Signs  Temp: 97.7 °F (36.5 °C) (05/03/25 1527)  Pulse: 80 (05/03/25 1527)  Resp: 18 (05/03/25 1527)  BP: 139/71 (05/03/25 1527)  SpO2: 99 % (05/03/25 1527)     24 Hour VS Range    Temp:  [97.6 °F (36.4 °C)-98.7 °F (37.1 °C)]   Pulse:  []   Resp:  [18]   BP: (132-225)/()   SpO2:  [95 %-100 %]     Intake/Output Summary (Last 24 hours) at 5/3/2025 1821  Last data filed at 5/3/2025 1300  Gross per 24  "hour   Intake 960 ml   Output --   Net 960 ml         I/O This Shift:  I/O this shift:  In: 480 [P.O.:480]  Out: -     Wt Readings from Last 3 Encounters:   04/29/25 90.7 kg (200 lb)   04/22/25 92.8 kg (204 lb 9.4 oz)   04/16/25 91.9 kg (202 lb 9.6 oz)       I have personally reviewed the vitals and recorded Intake/Output     Laboratory/Diagnostic Data:    CBC/Anemia Labs: Coags:    Recent Labs   Lab 05/01/25  0233 05/02/25  0215 05/03/25  0237   WBC 4.19 5.71 4.08   HGB 8.3* 8.5* 8.4*   HCT 28.0* 29.0* 29.1*   * 107* 114*   MCV 76* 78* 78*   RDW 19.5* 19.9* 19.7*    No results for input(s): "PT", "INR", "APTT" in the last 168 hours.     Chemistries: ABG:   Recent Labs   Lab 04/29/25  0758 04/30/25  0619 05/01/25  0233 05/01/25  1159 05/02/25  0215 05/03/25  0237 05/03/25  1627   *   < > 136  --  135* 135* 137   K 3.3*   < > 3.5  --  3.5 3.2* 4.1   CL 99   < > 104  --  104 106 106   CO2 25   < > 24  --  21* 20* 23   BUN 49*   < > 47*  --  48* 45* 43*   CREATININE 2.7*   < > 2.6*   < > 2.4* 2.6* 2.8*   CALCIUM 8.2*   < > 8.0*  --  8.1* 8.0* 8.5*   PROT 5.9*  --   --   --   --   --   --    BILITOT 0.5  --   --   --   --   --   --    ALKPHOS 52  --   --   --   --   --   --    ALT <5*  --   --   --   --   --   --    AST 14  --   --   --   --   --   --    MG 2.2   < > 2.4  --  2.3 2.1  --    PHOS  --    < > 3.1  --  2.8 3.4 3.6    < > = values in this interval not displayed.    Recent Labs   Lab 04/29/25  0804   PH 7.439   PCO2 40.5   PO2 47.3   HCO3 26.8        POCT Glucose: HbA1c:    Recent Labs   Lab 05/02/25  1213 05/02/25  1654 05/02/25  2108 05/03/25  0802 05/03/25  1123 05/03/25  1638   POCTGLUCOSE 85 161* 147* 100 156* 148*    Hemoglobin A1C   Date Value Ref Range Status   01/27/2025 8.6 (H) 4.0 - 5.6 % Final     Comment:     ADA Screening Guidelines:  5.7-6.4%  Consistent with prediabetes  >or=6.5%  Consistent with diabetes    High levels of fetal hemoglobin interfere with the HbA1C  assay. " "Heterozygous hemoglobin variants (HbS, HgC, etc)do  not significantly interfere with this assay.   However, presence of multiple variants may affect accuracy.     12/07/2024 8.1 (H) 4.0 - 5.6 % Final     Comment:     ADA Screening Guidelines:  5.7-6.4%  Consistent with prediabetes  >or=6.5%  Consistent with diabetes    High levels of fetal hemoglobin interfere with the HbA1C  assay. Heterozygous hemoglobin variants (HbS, HgC, etc)do  not significantly interfere with this assay.   However, presence of multiple variants may affect accuracy.     11/19/2024 7.4 (H) 4.0 - 5.6 % Final     Comment:     ADA Screening Guidelines:  5.7-6.4%  Consistent with prediabetes  >or=6.5%  Consistent with diabetes    High levels of fetal hemoglobin interfere with the HbA1C  assay. Heterozygous hemoglobin variants (HbS, HgC, etc)do  not significantly interfere with this assay.   However, presence of multiple variants may affect accuracy.       Hemoglobin A1c   Date Value Ref Range Status   03/27/2025 8.3 (H) 4.0 - 5.6 % Final     Comment:     ADA Screening Guidelines:  5.7-6.4%  Consistent with prediabetes  >=6.5%  Consistent with diabetes    High levels of fetal hemoglobin interfere with the HbA1C  assay. Heterozygous hemoglobin variants (HbS, HgC, etc)do  not significantly interfere with this assay.   However, presence of multiple variants may affect accuracy.        Cardiac Enzymes: Ejection Fractions:    No results for input(s): "CPK", "CPKMB", "MB", "TROPONINI" in the last 72 hours. EF   Date Value Ref Range Status   02/21/2025 53 % Final   06/16/2024 58 % Final          No results for input(s): "COLORU", "APPEARANCEUA", "PHUR", "SPECGRAV", "PROTEINUA", "GLUCUA", "KETONESU", "BILIRUBINUA", "OCCULTUA", "NITRITE", "UROBILINOGEN", "LEUKOCYTESUR", "RBCUA", "WBCUA", "BACTERIA", "SQUAMEPITHEL", "HYALINECASTS" in the last 48 hours.    Invalid input(s): "WRIGHTSUR"    Lactate (Lactic Acid) (mmol/L)   Date Value   07/11/2022 1.0 "   07/10/2022 3.0 (H)   07/10/2022 3.1 (H)   03/15/2019 1.5   03/15/2019 3.3 (H)     BNP (pg/mL)   Date Value   03/16/2025 1,766 (H)   02/20/2025 1,055 (H)   06/25/2024 992 (H)   06/15/2024 3,203 (H)   06/04/2024 3,313 (H)     CRP (mg/L)   Date Value   04/02/2024 6.9   07/10/2022 40.5 (H)     Sed Rate   Date Value   04/02/2024 62 mm/Hr (H)   03/09/2007 2 mm/hr   05/10/2006 6 mm/hr     D-Dimer (mg/L FEU)   Date Value   07/10/2022 0.45     Ferritin (ng/mL)   Date Value   04/01/2024 816 (H)   07/10/2022 148   02/07/2022 73   05/26/2021 29   01/30/2017 251   11/08/2016 389 (H)     LD (U/L)   Date Value   04/01/2024 206   07/10/2022 221   11/10/2021 220     Troponin I (ng/mL)   Date Value   06/15/2024 0.812 (H)   06/15/2024 0.948 (H)   06/15/2024 0.934 (H)   05/02/2024 0.036 (H)   05/02/2024 0.049 (H)   07/11/2022 0.090 (H)   07/10/2022 0.265 (H)   03/16/2019 0.037 (H)     CPK (U/L)   Date Value   07/10/2022 83   03/16/2019 56   05/09/2008 86   05/15/2006 87   05/10/2006 44   05/10/2006 29   05/09/2006 40     Results for orders placed or performed in visit on 07/11/24   Vitamin D    Collection Time: 07/11/24  4:44 PM   Result Value Ref Range    Vit D, 25-Hydroxy 17 (L) 30 - 96 ng/mL     *Note: Due to a large number of results and/or encounters for the requested time period, some results have not been displayed. A complete set of results can be found in Results Review.     SARS-CoV2 (COVID-19) Qualitative PCR (no units)   Date Value   05/02/2025 Not Detected   03/16/2025 Not Detected   12/18/2020 Not Detected     SARS-CoV-2 RNA, Amplification, Qual (no units)   Date Value   03/16/2025 Negative   12/07/2024 Negative     POC Rapid COVID (no units)   Date Value   07/08/2022 Positive (A)   08/02/2021 Positive (A)       Microbiology labs for the last week  Microbiology Results (last 7 days)       Procedure Component Value Units Date/Time    Blood culture x two cultures. Draw prior to antibiotics. [[id number]]  (Normal)  Collected: 04/29/25 0758    Order Status: Completed Specimen: Blood from Peripheral, Forearm, Left Updated: 05/03/25 1801     Blood Culture No Growth After 96 hours    Blood culture x two cultures. Draw prior to antibiotics. [[id number]]  (Normal) Collected: 04/29/25 0758    Order Status: Completed Specimen: Blood from Peripheral, Antecubital, Left Updated: 05/03/25 1801     Blood Culture No Growth After 96 hours    Respiratory Infection Panel (PCR), Nasopharyngeal [[id number]] Collected: 05/02/25 1027    Order Status: Completed Specimen: Nasopharyngeal Swab Updated: 05/02/25 1545     Respiratory Infection Panel Source Nasopharyngeal Swab     Adenovirus Not Detected     Coronavirus 229E, Common Cold Virus Not Detected     Coronavirus HKU1, Common Cold Virus Not Detected     Coronavirus NL63, Common Cold Virus Not Detected     Coronavirus OC43, Common Cold Virus Not Detected     SARS-CoV2 (COVID-19) Qualitative PCR Not Detected     Human Metapneumovirus Not Detected     Human Rhinovirus/Enterovirus Not Detected     Influenza A Not Detected     Influenza B Not Detected     Parainfluenza Virus 1 Not Detected     Parainfluenza Virus 2 Not Detected     Parainfluenza Virus 3 Not Detected     Parainfluenza Virus 4 Not Detected     Respiratory Syncytial Virus Not Detected     Bordetella Parapertussis (GY1264) Not Detected     Bordetella pertussis (ptxP) Not Detected     Chlamydia pneumoniae Not Detected     Mycoplasma pneumoniae Not Detected    MRSA Screen by PCR [[id number]]     Order Status: Sent Specimen: Nasal Swab     Culture, Respiratory with Gram Stain [[id number]]     Order Status: Sent Specimen: Respiratory     Culture, Respiratory with Gram Stain [[id number]]     Order Status: Sent Specimen: Respiratory     Influenza A & B by Molecular [[id number]]  (Normal) Collected: 04/29/25 0749    Order Status: Completed Specimen: Nasal Swab Updated: 04/29/25 0836     INFLUENZA A MOLECULAR Negative     INFLUENZA B MOLECULAR   Negative            Reviewed and noted in plan where applicable- Please see chart for full lab data.    Lines/Drains:       Peripheral IV - Single Lumen 04/30/25 1112 20 G Anterior;Distal;Right Upper Arm (Active)   Site Assessment Clean;Dry;Intact;No redness;No swelling;No drainage 05/01/25 0800   Line Status Saline locked;Flushed 05/01/25 0800   Dressing Status Intact;Dry;Clean 05/01/25 0800   Dressing Intervention Integrity maintained 05/01/25 0800   Number of days: 1       Imaging  ECG Results              EKG 12-lead (Final result)        Collection Time Result Time QRS Duration OHS QTC Calculation    04/29/25 07:35:29 04/29/25 07:45:23 112 460                     Final result by Interface, Lab In [facility] (04/29/25 07:45:29)                   Narrative:    Test Reason : Z13.6,    Vent. Rate :  78 BPM     Atrial Rate : 300 BPM     P-R Int :    ms          QRS Dur : 112 ms      QT Int : 404 ms       P-R-T Axes : -89 105   8 degrees    QTcB Int : 460 ms    Atrial flutter  Rightward axis  Nonspecific ST and T wave abnormality  Low septal forces ; Abnormal R wave progression in the precordial leads  -Possible anterior infarct  Possible lateral infarct vs lead reversal  Prolonged QT  Abnormal ECG  When compared with ECG of 16-Apr-2025 13:38,  The axis Shifted right  ST no longer elevated in Anterior leads  Confirmed by Tejas Carter ([id number]) on 4/29/2025 7:45:21 AM    Referred By: AAAREFERRAL SELF           Confirmed By: Tejas Crater                                    Results for orders placed during the hospital encounter of 02/20/25    Echo    Interpretation Summary    Left Ventricle: There is mild concentric hypertrophy. There is low normal systolic function with a visually estimated ejection fraction of 50 - 55%. Grade III diastolic dysfunction.    Left Ventricle: The left ventricle is at the upper limits of normal in size. Mildly increased wall thickness. There is mild concentric hypertrophy. Regional wall motion  abnormalities present. See diagram for wall motion findings. There is low normal systolic function with a visually estimated ejection fraction of 50 - 55%. Ejection fraction is approximately 53%. Grade III diastolic dysfunction.    Right Ventricle: Systolic function is borderline low despite the low TAPSE.    Left Atrium: Left atrium is moderately dilated.    Aortic Valve: The aortic valve is a trileaflet valve. There is mild aortic valve sclerosis. There is moderate annular calcification present. There is mild aortic regurgitation with a centrally directed jet.    Mitral Valve: There is mild regurgitation with a centrally directed jet.    Pericardium: There is a trivial posterior effusion and under the RA.    Tricuspid Valve: There is mild regurgitation.    Pulmonary Artery: The estimated pulmonary artery systolic pressure is 30 mmHg.      CT Chest Without Contrast  Narrative: EXAMINATION:  CT CHEST WITHOUT CONTRAST    CLINICAL HISTORY:  Pneumonia, unresolved;    TECHNIQUE:  CT chest without contrast acquiring images from above the pulmonary apices to the upper abdomen.  Data was reconstructed for multiplanar images in axial, sagittal and coronal planes and for maximal intensity projection images in the the axial plane.    COMPARISON:  None.    FINDINGS:  Base of Neck/thoracic soft tissues: No significant abnormality.    Aorta: Left-sided aortic arch. No aneurysm. Three vessel aortic arch. Moderate calcified atherosclerosis of the thoracic aorta.    Heart/pericardium: Dilated left ventricle moderate multivessel calcified atherosclerosis of the coronary arteries.  No pericardial effusion.    Airways/Lungs/Pleura:  Central airways are patent. Bilateral multifocal ground glass opacities and small irregular consolidations associated with bronchial wall thickening.  Right moderate and left small pleural effusions.  Mild apical paraseptal emphysema.  No pneumothorax.    Pulmonary vasculature:  Unremarkable.    Marlen/Mediastinum: No pathologic christiano enlargement.    Esophagus: Unremarkable.    Upper Abdomen: Moderate abdominal calcified atherosclerosis involving the celiac trunk    Bones: Anterior large bridging osteophytes between T8-T9. No suspicious osseous lesion.  Impression: Bilateral multilobar consolidations and ground-glass opacities associated with bronchial thickening suggestive of infectious/inflammatory process.    Right moderate and left small pleural effusions.    Electronically signed by: Matthew Moore  Date:    05/01/2025  Time:    16:27      Labs, Imaging, EKG and Diagnostic results from 5/3/2025 were reviewed.    Medications:  Medication list was reviewed and changes noted under Assessment/Plan.  Medications Ordered Prior to Encounter[1]  Scheduled Medications:  Current Facility-Administered Medications   Medication Dose Route Frequency    acetaminophen  1,000 mg Oral Q12H    atorvastatin  80 mg Oral Daily    ceFEPime IV (PEDS and ADULTS)  2 g Intravenous Q12H    doxazosin  8 mg Oral Daily    empagliflozin  10 mg Oral Daily    gabapentin  300 mg Oral Daily    gabapentin  300 mg Oral After lunch    gabapentin  300 mg Oral QHS    hydrALAZINE  100 mg Oral Q8H    insulin glargine U-100  15 Units Subcutaneous QHS    Lactobacillus rhamnosus GG  1 capsule Oral Daily    polyethylene glycol  17 g Oral Daily    predniSONE  5 mg Oral Daily    rivaroxaban  15 mg Oral Daily with dinner    tacrolimus  1 mg Oral BID    torsemide  40 mg Oral Daily     PRN:   Current Facility-Administered Medications:     albuterol-ipratropium, 3 mL, Nebulization, Q6H PRN    dextrose 50%, 12.5 g, Intravenous, PRN    dextrose 50%, 25 g, Intravenous, PRN    glucagon (human recombinant), 1 mg, Intramuscular, PRN    glucose, 16 g, Oral, PRN    glucose, 24 g, Oral, PRN    hydrALAZINE, 15 mg, Intravenous, Q6H PRN    insulin aspart U-100, 0-10 Units, Subcutaneous, QID (AC + HS) PRN    meclizine, 25 mg, Oral, TID PRN     melatonin, 6 mg, Oral, Nightly PRN    naloxone, 0.02 mg, Intravenous, PRN    ondansetron, 8 mg, Oral, Q8H PRN    prochlorperazine, 5 mg, Intravenous, Q6H PRN    sodium chloride 0.9%, 10 mL, Intravenous, Q8H PRN  Infusions:   Estimated Creatinine Clearance: 28.9 mL/min (A) (based on SCr of 2.8 mg/dL (H)).             Assessment & Plan  Community acquired bacterial pneumonia  Hemoptysis    Patient has a diagnosis of pneumonia. The cause of the pneumonia is suspected to be bacterial in etiology but organism is not known. The pneumonia is stable. The patient has the following signs/symptoms of pneumonia: cough. The patient does not have a current oxygen requirement and the patient does not have a home oxygen requirement. I have reviewed the pertinent imaging. The following cultures have been collected: Blood cultures and Sputum culture The culture results are listed below.     Current antimicrobial regimen consists of the antibiotics listed below. Will monitor patient closely and continue current treatment plan unchanged.    -Given fevers/chills and recurrent pna, reasonable to continue below abx and deescalate when indicated  -f/u sputum, blood cx    Antibiotics (From admission, onward)      Start     Stop Route Frequency Ordered    05/02/25 1000  ceFEPIme injection 2 g         -- IV Every 12 hours (non-standard times) 05/02/25 0745    04/30/25 0900  azithromycin (ZITHROMAX) 500 mg in 0.9% NaCl 250 mL IVPB (admixture device)         05/01/25 1259 IV Every 24 hours (non-standard times) 04/29/25 1016          Microbiology Results (last 7 days)       Procedure Component Value Units Date/Time    Blood culture x two cultures. Draw prior to antibiotics. [[id number]]  (Normal) Collected: 04/29/25 4138    Order Status: Completed Specimen: Blood from Peripheral, Forearm, Left Updated: 05/03/25 1801     Blood Culture No Growth After 96 hours    Blood culture x two cultures. Draw prior to antibiotics. [[id number]]  (Normal)  Collected: 04/29/25 0758    Order Status: Completed Specimen: Blood from Peripheral, Antecubital, Left Updated: 05/03/25 1801     Blood Culture No Growth After 96 hours    Respiratory Infection Panel (PCR), Nasopharyngeal [[id number]] Collected: 05/02/25 1027    Order Status: Completed Specimen: Nasopharyngeal Swab Updated: 05/02/25 1545     Respiratory Infection Panel Source Nasopharyngeal Swab     Adenovirus Not Detected     Coronavirus 229E, Common Cold Virus Not Detected     Coronavirus HKU1, Common Cold Virus Not Detected     Coronavirus NL63, Common Cold Virus Not Detected     Coronavirus OC43, Common Cold Virus Not Detected     SARS-CoV2 (COVID-19) Qualitative PCR Not Detected     Human Metapneumovirus Not Detected     Human Rhinovirus/Enterovirus Not Detected     Influenza A Not Detected     Influenza B Not Detected     Parainfluenza Virus 1 Not Detected     Parainfluenza Virus 2 Not Detected     Parainfluenza Virus 3 Not Detected     Parainfluenza Virus 4 Not Detected     Respiratory Syncytial Virus Not Detected     Bordetella Parapertussis (UN8506) Not Detected     Bordetella pertussis (ptxP) Not Detected     Chlamydia pneumoniae Not Detected     Mycoplasma pneumoniae Not Detected    MRSA Screen by PCR [[id number]]     Order Status: Sent Specimen: Nasal Swab     Culture, Respiratory with Gram Stain [[id number]]     Order Status: Sent Specimen: Respiratory     Culture, Respiratory with Gram Stain [[id number]]     Order Status: Sent Specimen: Respiratory     Influenza A & B by Molecular [[id number]]  (Normal) Collected: 04/29/25 0749    Order Status: Completed Specimen: Nasal Swab Updated: 04/29/25 0836     INFLUENZA A MOLECULAR Negative     INFLUENZA B MOLECULAR  Negative          5/1  BC x2 4/29 NGTD. vancomycins discontinued.  respiratory cultures pending.  CT chest ordered -RML GGO concerning for pneumonia possible hiatal hernia and possible anterior vertebral osteophyte near mid esophagus.  SLP  consulted  and consider esophogram to assess for hiatal hernia and recurrent aspiration as a cause of repeat pneumonia. continue cefepime. completed azithromycin. denies hemoptysis this admission. last episode 2 weeks back. resumed prograf 1mg BID. continue prednisone. hold cellcept for now     Patient has a diagnosis of pneumonia. The cause of the pneumonia is suspected to be bacterial in etiology but organism is not known. The pneumonia is stable. The patient has the following signs/symptoms of pneumonia: cough. The patient does not have a current oxygen requirement and the patient does not have a home oxygen requirement. I have reviewed the pertinent imaging. The following cultures have been collected: Blood cultures and Sputum culture The culture results are listed below.     Current antimicrobial regimen consists of the antibiotics listed below. Will monitor patient closely and continue current treatment plan unchanged.    -Given fevers/chills and recurrent pna, reasonable to continue below abx and deescalate when indicated  -f/u sputum, blood cx    Antibiotics (From admission, onward)      Start     Stop Route Frequency Ordered    05/02/25 1000  ceFEPIme injection 2 g         -- IV Every 12 hours (non-standard times) 05/02/25 0745    04/30/25 0900  azithromycin (ZITHROMAX) 500 mg in 0.9% NaCl 250 mL IVPB (admixture device)         05/01/25 1259 IV Every 24 hours (non-standard times) 04/29/25 1016          Antibiotics (From admission, onward)      Start     Stop Route Frequency Ordered    05/02/25 1000  ceFEPIme injection 2 g         -- IV Every 12 hours (non-standard times) 05/02/25 0745    04/30/25 0900  azithromycin (ZITHROMAX) 500 mg in 0.9% NaCl 250 mL IVPB (admixture device)         05/01/25 1259 IV Every 24 hours (non-standard times) 04/29/25 1016          5/1  PMH of DM, renal transplant 2016, CHFpEF, AF/AFL (PVI/PWI/CTI 6/2024), CVA, recurrent PNA who presents with fevers, chills.  States that symptoms  are similar to last month when he was admitted for pneumonia. No other pna symptoms despite CXR changes.BC x2 4/29 NGTD. vancomycins discontinued.  respiratory cultures pending.  CT chest ordered -L GGO concerning for pneumonia possible hiatal hernia and possible anterior vertebral osteophyte near mid esophagus.  SLP consulted  and  barium esophogram to assess for hiatal hernia (second episode of pneumonia since March 2025) and recurrent aspiration as a cause of repeat pneumonia. continue cefepime. completed azithromycin. denies hemoptysis this admission. last episode 2 weeks back. resumed prograf 1mg BID. continue prednisone. hold cellcept for now     Patient has a diagnosis of pneumonia. The cause of the pneumonia is suspected to be bacterial in etiology but organism is not known. The pneumonia is stable. The patient has the following signs/symptoms of pneumonia: cough. The patient does not have a current oxygen requirement and the patient does not have a home oxygen requirement. I have reviewed the pertinent imaging. The following cultures have been collected: Blood cultures and Sputum culture The culture results are listed below.     Current antimicrobial regimen consists of the antibiotics listed below. Will monitor patient closely and continue current treatment plan unchanged.    -Given fevers/chills and recurrent pna, reasonable to continue below abx and deescalate when indicated  -f/u sputum, blood cx    Antibiotics (From admission, onward)      Start     Stop Route Frequency Ordered    05/02/25 1000  ceFEPIme injection 2 g         -- IV Every 12 hours (non-standard times) 05/02/25 0745    04/30/25 0900  azithromycin (ZITHROMAX) 500 mg in 0.9% NaCl 250 mL IVPB (admixture device)         05/01/25 1259 IV Every 24 hours (non-standard times) 04/29/25 1016          Microbiology Results (last 7 days)       Procedure Component Value Units Date/Time    Blood culture x two cultures. Draw prior to antibiotics.  [[id number]]  (Normal) Collected: 04/29/25 0758    Order Status: Completed Specimen: Blood from Peripheral, Forearm, Left Updated: 05/03/25 1801     Blood Culture No Growth After 96 hours    Blood culture x two cultures. Draw prior to antibiotics. [[id number]]  (Normal) Collected: 04/29/25 0758    Order Status: Completed Specimen: Blood from Peripheral, Antecubital, Left Updated: 05/03/25 1801     Blood Culture No Growth After 96 hours    Respiratory Infection Panel (PCR), Nasopharyngeal [[id number]] Collected: 05/02/25 1027    Order Status: Completed Specimen: Nasopharyngeal Swab Updated: 05/02/25 1545     Respiratory Infection Panel Source Nasopharyngeal Swab     Adenovirus Not Detected     Coronavirus 229E, Common Cold Virus Not Detected     Coronavirus HKU1, Common Cold Virus Not Detected     Coronavirus NL63, Common Cold Virus Not Detected     Coronavirus OC43, Common Cold Virus Not Detected     SARS-CoV2 (COVID-19) Qualitative PCR Not Detected     Human Metapneumovirus Not Detected     Human Rhinovirus/Enterovirus Not Detected     Influenza A Not Detected     Influenza B Not Detected     Parainfluenza Virus 1 Not Detected     Parainfluenza Virus 2 Not Detected     Parainfluenza Virus 3 Not Detected     Parainfluenza Virus 4 Not Detected     Respiratory Syncytial Virus Not Detected     Bordetella Parapertussis (XN3752) Not Detected     Bordetella pertussis (ptxP) Not Detected     Chlamydia pneumoniae Not Detected     Mycoplasma pneumoniae Not Detected    MRSA Screen by PCR [[id number]]     Order Status: Sent Specimen: Nasal Swab     Culture, Respiratory with Gram Stain [[id number]]     Order Status: Sent Specimen: Respiratory     Culture, Respiratory with Gram Stain [[id number]]     Order Status: Sent Specimen: Respiratory     Influenza A & B by Molecular [[id number]]  (Normal) Collected: 04/29/25 0749    Order Status: Completed Specimen: Nasal Swab Updated: 04/29/25 0836     INFLUENZA A MOLECULAR Negative      INFLUENZA B MOLECULAR  Negative          5/1  BC x2 4/29 NGTD. vancomycins discontinued.  respiratory cultures pending.  CT chest ordered -RML GGO concerning for pneumonia possible hiatal hernia and possible anterior vertebral osteophyte near mid esophagus.  SLP consulted  and consider esophogram to assess for hiatal hernia and recurrent aspiration as a cause of repeat pneumonia. continue cefepime. completed azithromycin. denies hemoptysis this admission. last episode 2 weeks back. resumed prograf 1mg BID. continue prednisone. hold cellcept for now     Patient has a diagnosis of pneumonia. The cause of the pneumonia is suspected to be bacterial in etiology but organism is not known. The pneumonia is stable. The patient has the following signs/symptoms of pneumonia: cough. The patient does not have a current oxygen requirement and the patient does not have a home oxygen requirement. I have reviewed the pertinent imaging. The following cultures have been collected: Blood cultures and Sputum culture The culture results are listed below.     Current antimicrobial regimen consists of the antibiotics listed below. Will monitor patient closely and continue current treatment plan unchanged.    -Given fevers/chills and recurrent pna, reasonable to continue below abx and deescalate when indicated  -f/u sputum, blood cx    Antibiotics (From admission, onward)      Start     Stop Route Frequency Ordered    05/02/25 1000  ceFEPIme injection 2 g         -- IV Every 12 hours (non-standard times) 05/02/25 0745    04/30/25 0900  azithromycin (ZITHROMAX) 500 mg in 0.9% NaCl 250 mL IVPB (admixture device)         05/01/25 1259 IV Every 24 hours (non-standard times) 04/29/25 1016          Antibiotics (From admission, onward)      Start     Stop Route Frequency Ordered    05/02/25 1000  ceFEPIme injection 2 g         -- IV Every 12 hours (non-standard times) 05/02/25 0745    04/30/25 0900  azithromycin (ZITHROMAX) 500 mg in 0.9% NaCl  "250 mL IVPB (admixture device)         05/01/25 1259 IV Every 24 hours (non-standard times) 04/29/25 1016          Type 2 diabetes mellitus with stage 3a chronic kidney disease, with long-term current use of insulin  Creatine stable for now. BMP reviewed- noted Estimated Creatinine Clearance: 28.9 mL/min (A) (based on SCr of 2.8 mg/dL (H)). according to latest data. Based on current GFR, CKD stage is stage 3 - GFR 30-59.  Monitor UOP and serial BMP and adjust therapy as needed. Renally dose meds. Avoid nephrotoxic medications and procedures.  Hyperlipidemia  Continue statin    Prophylactic immunotherapy  KTM consutled for assistance with management     KTM consutled for assistance with management     KTM consutled for assistance with management     Chronic combined systolic (congestive) and diastolic (congestive) heart failure  Patient has Diastolic (HFpEF) heart failure that is Chronic. On presentation their CHF was well compensated. Most recent BNP and echo results are listed below.  No results for input(s): "BNP" in the last 72 hours.  Latest ECHO  Results for orders placed during the hospital encounter of 02/20/25    Echo    Interpretation Summary    Left Ventricle: There is mild concentric hypertrophy. There is low normal systolic function with a visually estimated ejection fraction of 50 - 55%. Grade III diastolic dysfunction.    Left Ventricle: The left ventricle is at the upper limits of normal in size. Mildly increased wall thickness. There is mild concentric hypertrophy. Regional wall motion abnormalities present. See diagram for wall motion findings. There is low normal systolic function with a visually estimated ejection fraction of 50 - 55%. Ejection fraction is approximately 53%. Grade III diastolic dysfunction.    Right Ventricle: Systolic function is borderline low despite the low TAPSE.    Left Atrium: Left atrium is moderately dilated.    Aortic Valve: The aortic valve is a trileaflet valve. There " is mild aortic valve sclerosis. There is moderate annular calcification present. There is mild aortic regurgitation with a centrally directed jet.    Mitral Valve: There is mild regurgitation with a centrally directed jet.    Pericardium: There is a trivial posterior effusion and under the RA.    Tricuspid Valve: There is mild regurgitation.    Pulmonary Artery: The estimated pulmonary artery systolic pressure is 30 mmHg.    Current Heart Failure Medications  hydrALAZINE tablet 100 mg, Every 8 hours, Oral  empagliflozin (Jardiance) tablet 10 mg, Daily, Oral  hydrALAZINE injection 15 mg, Every 6 hours PRN, Intravenous  torsemide tablet 40 mg, Daily, Oral    Plan  - Monitor strict I&Os and daily weights.    - Place on telemetry  - Low sodium diet  - Cardiology has not been consulted  - The patient's volume status is at their baseline  S/P cadaveric kidney transplant 11/5/2016. ESRD secondary to HTN/DMII  -KTM consulted  -daily tacro level, dosing per KTM  -Cont prednisone    Immunocompromised state due to drug therapy  KTM consutled for assistance with management     KTM consutled for assistance with management     KTM consutled for assistance with management     CKD IV (severe)  Creatine stable for now. BMP reviewed- noted Estimated Creatinine Clearance: 28.9 mL/min (A) (based on SCr of 2.8 mg/dL (H)). according to latest data. Based on current GFR, CKD stage is stage 3 - GFR 30-59.  Monitor UOP and serial BMP and adjust therapy as needed. Renally dose meds. Avoid nephrotoxic medications and procedures.    Recent Labs   Lab 05/02/25  0215 05/03/25  0237 05/03/25  1627   BUN 48* 45* 43*   CREATININE 2.4* 2.6* 2.8*       I & O     Intake/Output Summary (Last 24 hours) at 5/3/2025 1821  Last data filed at 5/3/2025 1300  Gross per 24 hour   Intake 960 ml   Output --   Net 960 ml      Type 2 diabetes mellitus  Patient's FSGs are controlled on current medication regimen.  Last A1c reviewed-   Lab Results   Component Value  Date    HGBA1C 8.3 (H) 03/27/2025     Most recent fingerstick glucose reviewed-   Recent Labs   Lab 05/02/25  2108 05/03/25  0802 05/03/25  1123 05/03/25  1638   POCTGLUCOSE 147* 100 156* 148*     Current correctional scale  Medium  Maintain anti-hyperglycemic dose as follows-   Antihyperglycemics (From admission, onward)      Start     Stop Route Frequency Ordered    04/29/25 2100  insulin glargine U-100 (Lantus) pen 15 Units         -- SubQ Nightly 04/29/25 1016    04/29/25 1115  empagliflozin (Jardiance) tablet 10 mg        Question Answer Comment   Does this patient have a diagnosis of heart failure? Yes    Does this patient have type 1 diabetes or diabetic ketoacidosis? No    Does this patient have symptomatic hypotension? No    Is the patient NPO or pending major surgery in next 3 days or less? No        -- Oral Daily 04/29/25 1016    04/29/25 1113  insulin aspart U-100 pen 0-10 Units         -- SubQ Before meals & nightly PRN 04/29/25 1016          Hold Oral hypoglycemics while patient is in the hospital.    Blood glucose acceptable  Persistent atrial fibrillation  Patient has persistent (7 days or more) atrial fibrillation. Patient is currently in atrial fibrillation. [id number] Score: 3. The patients heart rate in the last 24 hours is as follows:  Pulse  Min: 76  Max: 105     Antiarrhythmics       Anticoagulants  rivaroxaban tablet 15 mg, With dinner, Oral    Plan  - Replete lytes with a goal of K>4, Mg >2  - Patient is anticoagulated, see medications listed above.  - Patient's afib is currently controlled  Anticoagulant long-term use  This patient has long term use on an anticoagulant with Select Anticoagulant(s): Direct oral anticoagulant: Rivaroxaban (Xarelto). Their long term anticoagulation will be Held or Continued: continued. They are on long term anticoagulation due to Reason for Anticoagulation: Atrial fibrillation.   BPH with urinary obstruction  Doxazosin   Immunosuppressed status  KTM consutled  for assistance with management     KTM consutled for assistance with management     KTM consutled for assistance with management     Anemia of chronic disease  Anemia is likely due to chronic disease due to Chronic Kidney Disease. Most recent hemoglobin and hematocrit are listed below.  Recent Labs     05/01/25 0233 05/02/25 0215 05/03/25 0237   HGB 8.3* 8.5* 8.4*   HCT 28.0* 29.0* 29.1*     Plan  - Monitor serial CBC: Daily  - Transfuse PRBC if patient becomes hemodynamically unstable, symptomatic or H/H drops below 7/21.  - Patient has not received any PRBC transfusions to date  - Patient's anemia is currently stable  Hypokalemia  Patient's most recent potassium results are listed below.   Recent Labs     05/02/25 0215 05/03/25 0237 05/03/25  1627   K 3.5 3.2* 4.1     Plan  - Replete potassium per protocol  - Monitor potassium Daily  - Patient's hypokalemia is stable  Pleural effusion  5/2 CT chest -Bilateral multilobar consolidations and ground-glass opacities associated with bronchial thickening suggestive of infectious/inflammatory process. Right moderate and left small pleural effusions.  ID consulted for recurrent pneumonia/ immunosuppressed/ hemoptysis.    Hypertensive urgency  \ Patient has a current diagnosis of hypertensive urgency (without evidence of end organ damage) which is uncontrolled.  Latest blood pressure and vitals reviewed-   Temp:  [97.6 °F (36.4 °C)-98.7 °F (37.1 °C)]   Pulse:  []   Resp:  [18]   BP: (132-225)/()   SpO2:  [95 %-100 %] .   Patient currently on IV antihypertensives.   Home meds for hypertension were reviewed and noted below.   Hypertension Medications              doxazosin (CARDURA) 4 MG tablet Take 1 tablet (4 mg total) by mouth once daily.    furosemide (LASIX) 40 MG tablet Take 2 tablets (80 mg total) by mouth daily as needed (for swelling).    hydrALAZINE (APRESOLINE) 100 MG tablet Take 1 tablet (100 mg total) by mouth every 8 (eight) hours.     hydroCHLOROthiazide 12.5 MG Tab Take 1 tablet (12.5 mg total) by mouth once daily.    valsartan (DIOVAN) 320 MG tablet Take 1 tablet (320 mg total) by mouth once daily.            5/3 SBP 200s overnight. started on IV hydralzine. received IV hydralazine 15mg x 2 doses. /84.     VTE Risk Mitigation (From admission, onward)           Ordered     rivaroxaban tablet 15 mg  With dinner         04/29/25 1016     IP VTE HIGH RISK PATIENT  Once         04/29/25 1016     Place sequential compression device  Until discontinued         04/29/25 1016                    Discharge Planning   UBALDO: 5/5/2025     Code Status: Full Code   Medical Readiness for Discharge Date:   Discharge Plan A: Home with family                        Cliff Guaman MD  Department of Hospital Medicine   [facility]         [1]   Current Facility-Administered Medications on File Prior to Encounter   Medication Dose Route Frequency Provider Last Rate Last Admin    balanced salt irrigation intra-ocular solution 1 drop  1 drop Right Eye On Call Procedure Jennifer Palacio MD        phenylephrine HCL 10% ophthalmic solution 1 drop  1 drop Right Eye PRN Jennifer Palacio MD        proparacaine 0.5 % ophthalmic solution 1 drop  1 drop Right Eye Daily PRN Jennifer Palacio MD        sodium chloride 0.9% flush 10 mL  10 mL Intravenous PRN Jennifer Palacio MD        TETRAcaine HCl (PF) 0.5 % Drop 1 drop  1 drop Right Eye PRN Jennifer Palacio MD         Current Outpatient Medications on File Prior to Encounter   Medication Sig Dispense Refill    atorvastatin (LIPITOR) 80 MG tablet Take 1 tablet (80 mg total) by mouth once daily. 90 tablet 3    blood sugar diagnostic Strp Use to check blood glucose 1 times daily, to use with insurance preferred meter 100 each 11    blood-glucose meter Misc Use to check blood glucose 1 times daily, to use with insurance preferred meter 1 each 0    blood-glucose sensor (DEXCOM G7 SENSOR) Kayla Change sensor  every 10 days. 3 each 11    doxazosin (CARDURA) 4 MG tablet Take 1 tablet (4 mg total) by mouth once daily. 90 tablet 3    empagliflozin (JARDIANCE) 10 mg tablet Take 1 tablet (10 mg total) by mouth once daily. 90 tablet 0    ergocalciferol (ERGOCALCIFEROL) 50,000 unit Cap Take 1 capsule (50,000 Units total) by mouth every 7 days. 4 capsule 6    furosemide (LASIX) 40 MG tablet Take 2 tablets (80 mg total) by mouth daily as needed (for swelling).      gabapentin (NEURONTIN) 300 MG capsule Take 1 capsule by mouth in the morning, 1 capsule midday, and 3 capsules at night. 450 capsule 3    hydrALAZINE (APRESOLINE) 100 MG tablet Take 1 tablet (100 mg total) by mouth every 8 (eight) hours. 270 tablet 3    hydroCHLOROthiazide 12.5 MG Tab Take 1 tablet (12.5 mg total) by mouth once daily. 30 tablet 11    insulin aspart U-100 (NOVOLOG U-100 INSULIN ASPART) 100 unit/mL injection Use in pump, max daily 100 units. 30 mL 11    insulin aspart U-100 (NOVOLOG) 100 unit/mL (3 mL) InPn pen Inject 10 units under the skin w/ breakfast, 7 units at lunch and dinner. Scale 180-230+2, 231-280+4, 281-330+6, 331-380+8, >380+10.  Max daily 57 units. (Patient not taking: Reported on 4/22/2025) 30 mL 11    insulin glargine U-100, Lantus, (LANTUS SOLOSTAR U-100 INSULIN) 100 unit/mL (3 mL) InPn pen Inject 20 Units into the skin every morning. (Patient not taking: Reported on 4/22/2025) 15 mL 6    insulin  cart,aut,G6/7,cntr (OMNIPOD 5 G6-G7 INTRO KT,GEN5,) Crtg Use as directed. 1 each 1    insulin pump cart,auto,BT,G6/7 (OMNIPOD 5 G6-G7 PODS, GEN 5,) Crtg Change every 48 hours. 45 each 3    Lactobacillus rhamnosus GG (CULTURELLE) 10 billion cell capsule Take 1 capsule by mouth once daily. 30 capsule 0    lancets 30 gauge Misc Use to check blood glucose 1 times daily, to use with insurance preferred meter 100 each 3    magnesium oxide (MAG-OX) 400 mg (241.3 mg magnesium) tablet Take 1 tablet (400 mg total) by mouth 2 (two) times daily. 60  "tablet 11    meclizine (ANTIVERT) 25 mg tablet Take 1 tablet (25 mg total) by mouth 3 (three) times daily as needed for Dizziness. 21 tablet 3    [Paused] mycophenolate (CELLCEPT) 250 mg Cap Take 2 capsules (500 mg total) by mouth 2 (two) times daily. 120 capsule 11    pen needle, diabetic (BD ULTRA-FINE LO PEN NEEDLE) 32 gauge x 5/32" Ndle USE TO ADMINISTER INSULIN 4 TIMES DAILY. 400 each 3    polyethylene glycol (MIRALAX) 17 gram PwPk Take 17 gm (mixed in liquid) by mouth every day.      predniSONE (DELTASONE) 5 MG tablet Take 1 tablet (5 mg total) by mouth once daily. 30 tablet 11    rivaroxaban (XARELTO) 15 mg Tab Take 1 tablet (15 mg total) by mouth daily with dinner or evening meal. 30 tablet 11    tacrolimus (PROGRAF) 1 MG Cap Take 1 capsule (1 mg total) by mouth every 12 (twelve) hours. 60 capsule 11    valsartan (DIOVAN) 320 MG tablet Take 1 tablet (320 mg total) by mouth once daily. 90 tablet 3    [DISCONTINUED] insulin lispro (HUMALOG KWIKPEN INSULIN) 100 unit/mL pen Inject 18 units w/ breakfast, 16 units w/ lunch and dinner plus scale 150-200 +2, 201-250 +4, 251-300 +6, 301-350 +8. 30 mL 4     "

## 2025-05-03 NOTE — ASSESSMENT & PLAN NOTE
Patient has a diagnosis of pneumonia. The cause of the pneumonia is suspected to be bacterial in etiology but organism is not known. The pneumonia is stable. The patient has the following signs/symptoms of pneumonia: cough. The patient does not have a current oxygen requirement and the patient does not have a home oxygen requirement. I have reviewed the pertinent imaging. The following cultures have been collected: Blood cultures and Sputum culture The culture results are listed below.     Current antimicrobial regimen consists of the antibiotics listed below. Will monitor patient closely and continue current treatment plan unchanged.    -Given fevers/chills and recurrent pna, reasonable to continue below abx and deescalate when indicated  -f/u sputum, blood cx    Antibiotics (From admission, onward)      Start     Stop Route Frequency Ordered    05/02/25 1000  ceFEPIme injection 2 g         -- IV Every 12 hours (non-standard times) 05/02/25 0745    04/30/25 0900  azithromycin (ZITHROMAX) 500 mg in 0.9% NaCl 250 mL IVPB (admixture device)         05/01/25 1259 IV Every 24 hours (non-standard times) 04/29/25 1016          Microbiology Results (last 7 days)       Procedure Component Value Units Date/Time    Blood culture x two cultures. Draw prior to antibiotics. [[id number]]  (Normal) Collected: 04/29/25 0758    Order Status: Completed Specimen: Blood from Peripheral, Forearm, Left Updated: 05/03/25 1801     Blood Culture No Growth After 96 hours    Blood culture x two cultures. Draw prior to antibiotics. [[id number]]  (Normal) Collected: 04/29/25 0758    Order Status: Completed Specimen: Blood from Peripheral, Antecubital, Left Updated: 05/03/25 1801     Blood Culture No Growth After 96 hours    Respiratory Infection Panel (PCR), Nasopharyngeal [[id number]] Collected: 05/02/25 1027    Order Status: Completed Specimen: Nasopharyngeal Swab Updated: 05/02/25 1545     Respiratory Infection Panel Source Nasopharyngeal  Swab     Adenovirus Not Detected     Coronavirus 229E, Common Cold Virus Not Detected     Coronavirus HKU1, Common Cold Virus Not Detected     Coronavirus NL63, Common Cold Virus Not Detected     Coronavirus OC43, Common Cold Virus Not Detected     SARS-CoV2 (COVID-19) Qualitative PCR Not Detected     Human Metapneumovirus Not Detected     Human Rhinovirus/Enterovirus Not Detected     Influenza A Not Detected     Influenza B Not Detected     Parainfluenza Virus 1 Not Detected     Parainfluenza Virus 2 Not Detected     Parainfluenza Virus 3 Not Detected     Parainfluenza Virus 4 Not Detected     Respiratory Syncytial Virus Not Detected     Bordetella Parapertussis (MU6636) Not Detected     Bordetella pertussis (ptxP) Not Detected     Chlamydia pneumoniae Not Detected     Mycoplasma pneumoniae Not Detected    MRSA Screen by PCR [[id number]]     Order Status: Sent Specimen: Nasal Swab     Culture, Respiratory with Gram Stain [[id number]]     Order Status: Sent Specimen: Respiratory     Culture, Respiratory with Gram Stain [[id number]]     Order Status: Sent Specimen: Respiratory     Influenza A & B by Molecular [[id number]]  (Normal) Collected: 04/29/25 0749    Order Status: Completed Specimen: Nasal Swab Updated: 04/29/25 0836     INFLUENZA A MOLECULAR Negative     INFLUENZA B MOLECULAR  Negative          5/1  BC x2 4/29 NGTD. vancomycins discontinued.  respiratory cultures pending.  CT chest ordered -RML GGO concerning for pneumonia possible hiatal hernia and possible anterior vertebral osteophyte near mid esophagus.  SLP consulted  and consider esophogram to assess for hiatal hernia and recurrent aspiration as a cause of repeat pneumonia. continue cefepime. completed azithromycin. denies hemoptysis this admission. last episode 2 weeks back. resumed prograf 1mg BID. continue prednisone. hold cellcept for now     Patient has a diagnosis of pneumonia. The cause of the pneumonia is suspected to be bacterial in etiology  but organism is not known. The pneumonia is stable. The patient has the following signs/symptoms of pneumonia: cough. The patient does not have a current oxygen requirement and the patient does not have a home oxygen requirement. I have reviewed the pertinent imaging. The following cultures have been collected: Blood cultures and Sputum culture The culture results are listed below.     Current antimicrobial regimen consists of the antibiotics listed below. Will monitor patient closely and continue current treatment plan unchanged.    -Given fevers/chills and recurrent pna, reasonable to continue below abx and deescalate when indicated  -f/u sputum, blood cx    Antibiotics (From admission, onward)      Start     Stop Route Frequency Ordered    05/02/25 1000  ceFEPIme injection 2 g         -- IV Every 12 hours (non-standard times) 05/02/25 0745    04/30/25 0900  azithromycin (ZITHROMAX) 500 mg in 0.9% NaCl 250 mL IVPB (admixture device)         05/01/25 1259 IV Every 24 hours (non-standard times) 04/29/25 1016          Antibiotics (From admission, onward)      Start     Stop Route Frequency Ordered    05/02/25 1000  ceFEPIme injection 2 g         -- IV Every 12 hours (non-standard times) 05/02/25 0745    04/30/25 0900  azithromycin (ZITHROMAX) 500 mg in 0.9% NaCl 250 mL IVPB (admixture device)         05/01/25 1259 IV Every 24 hours (non-standard times) 04/29/25 1016          5/1  PMH of DM, renal transplant 2016, CHFpEF, AF/AFL (PVI/PWI/CTI 6/2024), CVA, recurrent PNA who presents with fevers, chills.  States that symptoms are similar to last month when he was admitted for pneumonia. No other pna symptoms despite CXR changes.BC x2 4/29 NGTD. vancomycins discontinued.  respiratory cultures pending.  CT chest ordered -RML GGO concerning for pneumonia possible hiatal hernia and possible anterior vertebral osteophyte near mid esophagus.  SLP consulted  and  barium esophogram to assess for hiatal hernia (second episode of  pneumonia since March 2025) and recurrent aspiration as a cause of repeat pneumonia. continue cefepime. completed azithromycin. denies hemoptysis this admission. last episode 2 weeks back. resumed prograf 1mg BID. continue prednisone. hold cellcept for now     Patient has a diagnosis of pneumonia. The cause of the pneumonia is suspected to be bacterial in etiology but organism is not known. The pneumonia is stable. The patient has the following signs/symptoms of pneumonia: cough. The patient does not have a current oxygen requirement and the patient does not have a home oxygen requirement. I have reviewed the pertinent imaging. The following cultures have been collected: Blood cultures and Sputum culture The culture results are listed below.     Current antimicrobial regimen consists of the antibiotics listed below. Will monitor patient closely and continue current treatment plan unchanged.    -Given fevers/chills and recurrent pna, reasonable to continue below abx and deescalate when indicated  -f/u sputum, blood cx    Antibiotics (From admission, onward)      Start     Stop Route Frequency Ordered    05/02/25 1000  ceFEPIme injection 2 g         -- IV Every 12 hours (non-standard times) 05/02/25 0745    04/30/25 0900  azithromycin (ZITHROMAX) 500 mg in 0.9% NaCl 250 mL IVPB (admixture device)         05/01/25 1259 IV Every 24 hours (non-standard times) 04/29/25 1016          Microbiology Results (last 7 days)       Procedure Component Value Units Date/Time    Blood culture x two cultures. Draw prior to antibiotics. [[id number]]  (Normal) Collected: 04/29/25 0758    Order Status: Completed Specimen: Blood from Peripheral, Forearm, Left Updated: 05/03/25 1801     Blood Culture No Growth After 96 hours    Blood culture x two cultures. Draw prior to antibiotics. [[id number]]  (Normal) Collected: 04/29/25 0758    Order Status: Completed Specimen: Blood from Peripheral, Antecubital, Left Updated: 05/03/25 1801     Blood  Culture No Growth After 96 hours    Respiratory Infection Panel (PCR), Nasopharyngeal [[id number]] Collected: 05/02/25 1027    Order Status: Completed Specimen: Nasopharyngeal Swab Updated: 05/02/25 1545     Respiratory Infection Panel Source Nasopharyngeal Swab     Adenovirus Not Detected     Coronavirus 229E, Common Cold Virus Not Detected     Coronavirus HKU1, Common Cold Virus Not Detected     Coronavirus NL63, Common Cold Virus Not Detected     Coronavirus OC43, Common Cold Virus Not Detected     SARS-CoV2 (COVID-19) Qualitative PCR Not Detected     Human Metapneumovirus Not Detected     Human Rhinovirus/Enterovirus Not Detected     Influenza A Not Detected     Influenza B Not Detected     Parainfluenza Virus 1 Not Detected     Parainfluenza Virus 2 Not Detected     Parainfluenza Virus 3 Not Detected     Parainfluenza Virus 4 Not Detected     Respiratory Syncytial Virus Not Detected     Bordetella Parapertussis (FV3232) Not Detected     Bordetella pertussis (ptxP) Not Detected     Chlamydia pneumoniae Not Detected     Mycoplasma pneumoniae Not Detected    MRSA Screen by PCR [[id number]]     Order Status: Sent Specimen: Nasal Swab     Culture, Respiratory with Gram Stain [[id number]]     Order Status: Sent Specimen: Respiratory     Culture, Respiratory with Gram Stain [[id number]]     Order Status: Sent Specimen: Respiratory     Influenza A & B by Molecular [[id number]]  (Normal) Collected: 04/29/25 0749    Order Status: Completed Specimen: Nasal Swab Updated: 04/29/25 0836     INFLUENZA A MOLECULAR Negative     INFLUENZA B MOLECULAR  Negative          5/1  BC x2 4/29 NGTD. vancomycins discontinued.  respiratory cultures pending.  CT chest ordered -RML GGO concerning for pneumonia possible hiatal hernia and possible anterior vertebral osteophyte near mid esophagus.  SLP consulted  and consider esophogram to assess for hiatal hernia and recurrent aspiration as a cause of repeat pneumonia. continue cefepime.  completed azithromycin. denies hemoptysis this admission. last episode 2 weeks back. resumed prograf 1mg BID. continue prednisone. hold cellcept for now     Patient has a diagnosis of pneumonia. The cause of the pneumonia is suspected to be bacterial in etiology but organism is not known. The pneumonia is stable. The patient has the following signs/symptoms of pneumonia: cough. The patient does not have a current oxygen requirement and the patient does not have a home oxygen requirement. I have reviewed the pertinent imaging. The following cultures have been collected: Blood cultures and Sputum culture The culture results are listed below.     Current antimicrobial regimen consists of the antibiotics listed below. Will monitor patient closely and continue current treatment plan unchanged.    -Given fevers/chills and recurrent pna, reasonable to continue below abx and deescalate when indicated  -f/u sputum, blood cx    Antibiotics (From admission, onward)      Start     Stop Route Frequency Ordered    05/02/25 1000  ceFEPIme injection 2 g         -- IV Every 12 hours (non-standard times) 05/02/25 0745    04/30/25 0900  azithromycin (ZITHROMAX) 500 mg in 0.9% NaCl 250 mL IVPB (admixture device)         05/01/25 1259 IV Every 24 hours (non-standard times) 04/29/25 1016          Antibiotics (From admission, onward)      Start     Stop Route Frequency Ordered    05/02/25 1000  ceFEPIme injection 2 g         -- IV Every 12 hours (non-standard times) 05/02/25 0745    04/30/25 0900  azithromycin (ZITHROMAX) 500 mg in 0.9% NaCl 250 mL IVPB (admixture device)         05/01/25 1259 IV Every 24 hours (non-standard times) 04/29/25 1016

## 2025-05-03 NOTE — ASSESSMENT & PLAN NOTE
Patient's most recent potassium results are listed below.   Recent Labs     05/02/25  0215 05/03/25  0237 05/03/25  1627   K 3.5 3.2* 4.1     Plan  - Replete potassium per protocol  - Monitor potassium Daily  - Patient's hypokalemia is stable

## 2025-05-03 NOTE — ASSESSMENT & PLAN NOTE
Creatine stable for now. BMP reviewed- noted Estimated Creatinine Clearance: 28.9 mL/min (A) (based on SCr of 2.8 mg/dL (H)). according to latest data. Based on current GFR, CKD stage is stage 3 - GFR 30-59.  Monitor UOP and serial BMP and adjust therapy as needed. Renally dose meds. Avoid nephrotoxic medications and procedures.

## 2025-05-03 NOTE — ASSESSMENT & PLAN NOTE
Creatine stable for now. BMP reviewed- noted Estimated Creatinine Clearance: 28.9 mL/min (A) (based on SCr of 2.8 mg/dL (H)). according to latest data. Based on current GFR, CKD stage is stage 3 - GFR 30-59.  Monitor UOP and serial BMP and adjust therapy as needed. Renally dose meds. Avoid nephrotoxic medications and procedures.    Recent Labs   Lab 05/02/25  0215 05/03/25  0237 05/03/25  1627   BUN 48* 45* 43*   CREATININE 2.4* 2.6* 2.8*       I & O     Intake/Output Summary (Last 24 hours) at 5/3/2025 1821  Last data filed at 5/3/2025 1300  Gross per 24 hour   Intake 960 ml   Output --   Net 960 ml

## 2025-05-03 NOTE — ASSESSMENT & PLAN NOTE
Patient has a diagnosis of pneumonia. The cause of the pneumonia is suspected to be bacterial in etiology but organism is not known. The pneumonia is stable. The patient has the following signs/symptoms of pneumonia: cough. The patient does not have a current oxygen requirement and the patient does not have a home oxygen requirement. I have reviewed the pertinent imaging. The following cultures have been collected: Blood cultures and Sputum culture The culture results are listed below.     Current antimicrobial regimen consists of the antibiotics listed below. Will monitor patient closely and continue current treatment plan unchanged.    -Given fevers/chills and recurrent pna, reasonable to continue below abx and deescalate when indicated  -f/u sputum, blood cx    Antibiotics (From admission, onward)      Start     Stop Route Frequency Ordered    05/02/25 1000  ceFEPIme injection 2 g         -- IV Every 12 hours (non-standard times) 05/02/25 0745    04/30/25 0900  azithromycin (ZITHROMAX) 500 mg in 0.9% NaCl 250 mL IVPB (admixture device)         05/01/25 1259 IV Every 24 hours (non-standard times) 04/29/25 1016          Microbiology Results (last 7 days)       Procedure Component Value Units Date/Time    Blood culture x two cultures. Draw prior to antibiotics. [[id number]]  (Normal) Collected: 04/29/25 0758    Order Status: Completed Specimen: Blood from Peripheral, Forearm, Left Updated: 05/03/25 1801     Blood Culture No Growth After 96 hours    Blood culture x two cultures. Draw prior to antibiotics. [[id number]]  (Normal) Collected: 04/29/25 0758    Order Status: Completed Specimen: Blood from Peripheral, Antecubital, Left Updated: 05/03/25 1801     Blood Culture No Growth After 96 hours    Respiratory Infection Panel (PCR), Nasopharyngeal [[id number]] Collected: 05/02/25 1027    Order Status: Completed Specimen: Nasopharyngeal Swab Updated: 05/02/25 1545     Respiratory Infection Panel Source Nasopharyngeal  Swab     Adenovirus Not Detected     Coronavirus 229E, Common Cold Virus Not Detected     Coronavirus HKU1, Common Cold Virus Not Detected     Coronavirus NL63, Common Cold Virus Not Detected     Coronavirus OC43, Common Cold Virus Not Detected     SARS-CoV2 (COVID-19) Qualitative PCR Not Detected     Human Metapneumovirus Not Detected     Human Rhinovirus/Enterovirus Not Detected     Influenza A Not Detected     Influenza B Not Detected     Parainfluenza Virus 1 Not Detected     Parainfluenza Virus 2 Not Detected     Parainfluenza Virus 3 Not Detected     Parainfluenza Virus 4 Not Detected     Respiratory Syncytial Virus Not Detected     Bordetella Parapertussis (PV3934) Not Detected     Bordetella pertussis (ptxP) Not Detected     Chlamydia pneumoniae Not Detected     Mycoplasma pneumoniae Not Detected    MRSA Screen by PCR [[id number]]     Order Status: Sent Specimen: Nasal Swab     Culture, Respiratory with Gram Stain [[id number]]     Order Status: Sent Specimen: Respiratory     Culture, Respiratory with Gram Stain [[id number]]     Order Status: Sent Specimen: Respiratory     Influenza A & B by Molecular [[id number]]  (Normal) Collected: 04/29/25 0749    Order Status: Completed Specimen: Nasal Swab Updated: 04/29/25 0836     INFLUENZA A MOLECULAR Negative     INFLUENZA B MOLECULAR  Negative          5/1  BC x2 4/29 NGTD. vancomycins discontinued.  respiratory cultures pending.  CT chest ordered -RML GGO concerning for pneumonia possible hiatal hernia and possible anterior vertebral osteophyte near mid esophagus.  SLP consulted  and consider esophogram to assess for hiatal hernia and recurrent aspiration as a cause of repeat pneumonia. continue cefepime. completed azithromycin. denies hemoptysis this admission. last episode 2 weeks back. resumed prograf 1mg BID. continue prednisone. hold cellcept for now     Patient has a diagnosis of pneumonia. The cause of the pneumonia is suspected to be bacterial in etiology  but organism is not known. The pneumonia is stable. The patient has the following signs/symptoms of pneumonia: cough. The patient does not have a current oxygen requirement and the patient does not have a home oxygen requirement. I have reviewed the pertinent imaging. The following cultures have been collected: Blood cultures and Sputum culture The culture results are listed below.     Current antimicrobial regimen consists of the antibiotics listed below. Will monitor patient closely and continue current treatment plan unchanged.    -Given fevers/chills and recurrent pna, reasonable to continue below abx and deescalate when indicated  -f/u sputum, blood cx    Antibiotics (From admission, onward)      Start     Stop Route Frequency Ordered    05/02/25 1000  ceFEPIme injection 2 g         -- IV Every 12 hours (non-standard times) 05/02/25 0745    04/30/25 0900  azithromycin (ZITHROMAX) 500 mg in 0.9% NaCl 250 mL IVPB (admixture device)         05/01/25 1259 IV Every 24 hours (non-standard times) 04/29/25 1016          Antibiotics (From admission, onward)      Start     Stop Route Frequency Ordered    05/02/25 1000  ceFEPIme injection 2 g         -- IV Every 12 hours (non-standard times) 05/02/25 0745    04/30/25 0900  azithromycin (ZITHROMAX) 500 mg in 0.9% NaCl 250 mL IVPB (admixture device)         05/01/25 1259 IV Every 24 hours (non-standard times) 04/29/25 1016          5/1  PMH of DM, renal transplant 2016, CHFpEF, AF/AFL (PVI/PWI/CTI 6/2024), CVA, recurrent PNA who presents with fevers, chills.  States that symptoms are similar to last month when he was admitted for pneumonia. No other pna symptoms despite CXR changes.BC x2 4/29 NGTD. vancomycins discontinued.  respiratory cultures pending.  CT chest ordered -RML GGO concerning for pneumonia possible hiatal hernia and possible anterior vertebral osteophyte near mid esophagus.  SLP consulted  and  barium esophogram to assess for hiatal hernia (second episode of  pneumonia since March 2025) and recurrent aspiration as a cause of repeat pneumonia. continue cefepime. completed azithromycin. denies hemoptysis this admission. last episode 2 weeks back. resumed prograf 1mg BID. continue prednisone. hold cellcept for now     Patient has a diagnosis of pneumonia. The cause of the pneumonia is suspected to be bacterial in etiology but organism is not known. The pneumonia is stable. The patient has the following signs/symptoms of pneumonia: cough. The patient does not have a current oxygen requirement and the patient does not have a home oxygen requirement. I have reviewed the pertinent imaging. The following cultures have been collected: Blood cultures and Sputum culture The culture results are listed below.     Current antimicrobial regimen consists of the antibiotics listed below. Will monitor patient closely and continue current treatment plan unchanged.    -Given fevers/chills and recurrent pna, reasonable to continue below abx and deescalate when indicated  -f/u sputum, blood cx    Antibiotics (From admission, onward)      Start     Stop Route Frequency Ordered    05/02/25 1000  ceFEPIme injection 2 g         -- IV Every 12 hours (non-standard times) 05/02/25 0745    04/30/25 0900  azithromycin (ZITHROMAX) 500 mg in 0.9% NaCl 250 mL IVPB (admixture device)         05/01/25 1259 IV Every 24 hours (non-standard times) 04/29/25 1016          Microbiology Results (last 7 days)       Procedure Component Value Units Date/Time    Blood culture x two cultures. Draw prior to antibiotics. [[id number]]  (Normal) Collected: 04/29/25 0758    Order Status: Completed Specimen: Blood from Peripheral, Forearm, Left Updated: 05/03/25 1801     Blood Culture No Growth After 96 hours    Blood culture x two cultures. Draw prior to antibiotics. [[id number]]  (Normal) Collected: 04/29/25 0758    Order Status: Completed Specimen: Blood from Peripheral, Antecubital, Left Updated: 05/03/25 1801     Blood  Culture No Growth After 96 hours    Respiratory Infection Panel (PCR), Nasopharyngeal [[id number]] Collected: 05/02/25 1027    Order Status: Completed Specimen: Nasopharyngeal Swab Updated: 05/02/25 1545     Respiratory Infection Panel Source Nasopharyngeal Swab     Adenovirus Not Detected     Coronavirus 229E, Common Cold Virus Not Detected     Coronavirus HKU1, Common Cold Virus Not Detected     Coronavirus NL63, Common Cold Virus Not Detected     Coronavirus OC43, Common Cold Virus Not Detected     SARS-CoV2 (COVID-19) Qualitative PCR Not Detected     Human Metapneumovirus Not Detected     Human Rhinovirus/Enterovirus Not Detected     Influenza A Not Detected     Influenza B Not Detected     Parainfluenza Virus 1 Not Detected     Parainfluenza Virus 2 Not Detected     Parainfluenza Virus 3 Not Detected     Parainfluenza Virus 4 Not Detected     Respiratory Syncytial Virus Not Detected     Bordetella Parapertussis (HP2936) Not Detected     Bordetella pertussis (ptxP) Not Detected     Chlamydia pneumoniae Not Detected     Mycoplasma pneumoniae Not Detected    MRSA Screen by PCR [[id number]]     Order Status: Sent Specimen: Nasal Swab     Culture, Respiratory with Gram Stain [[id number]]     Order Status: Sent Specimen: Respiratory     Culture, Respiratory with Gram Stain [[id number]]     Order Status: Sent Specimen: Respiratory     Influenza A & B by Molecular [[id number]]  (Normal) Collected: 04/29/25 0749    Order Status: Completed Specimen: Nasal Swab Updated: 04/29/25 0836     INFLUENZA A MOLECULAR Negative     INFLUENZA B MOLECULAR  Negative          5/1  BC x2 4/29 NGTD. vancomycins discontinued.  respiratory cultures pending.  CT chest ordered -RML GGO concerning for pneumonia possible hiatal hernia and possible anterior vertebral osteophyte near mid esophagus.  SLP consulted  and consider esophogram to assess for hiatal hernia and recurrent aspiration as a cause of repeat pneumonia. continue cefepime.  completed azithromycin. denies hemoptysis this admission. last episode 2 weeks back. resumed prograf 1mg BID. continue prednisone. hold cellcept for now     Patient has a diagnosis of pneumonia. The cause of the pneumonia is suspected to be bacterial in etiology but organism is not known. The pneumonia is stable. The patient has the following signs/symptoms of pneumonia: cough. The patient does not have a current oxygen requirement and the patient does not have a home oxygen requirement. I have reviewed the pertinent imaging. The following cultures have been collected: Blood cultures and Sputum culture The culture results are listed below.     Current antimicrobial regimen consists of the antibiotics listed below. Will monitor patient closely and continue current treatment plan unchanged.    -Given fevers/chills and recurrent pna, reasonable to continue below abx and deescalate when indicated  -f/u sputum, blood cx    Antibiotics (From admission, onward)      Start     Stop Route Frequency Ordered    05/02/25 1000  ceFEPIme injection 2 g         -- IV Every 12 hours (non-standard times) 05/02/25 0745    04/30/25 0900  azithromycin (ZITHROMAX) 500 mg in 0.9% NaCl 250 mL IVPB (admixture device)         05/01/25 1259 IV Every 24 hours (non-standard times) 04/29/25 1016          Antibiotics (From admission, onward)      Start     Stop Route Frequency Ordered    05/02/25 1000  ceFEPIme injection 2 g         -- IV Every 12 hours (non-standard times) 05/02/25 0745    04/30/25 0900  azithromycin (ZITHROMAX) 500 mg in 0.9% NaCl 250 mL IVPB (admixture device)         05/01/25 1259 IV Every 24 hours (non-standard times) 04/29/25 1016

## 2025-05-03 NOTE — ASSESSMENT & PLAN NOTE
Patient's FSGs are controlled on current medication regimen.  Last A1c reviewed-   Lab Results   Component Value Date    HGBA1C 8.3 (H) 03/27/2025     Most recent fingerstick glucose reviewed-   Recent Labs   Lab 05/02/25  2108 05/03/25  0802 05/03/25  1123 05/03/25  1638   POCTGLUCOSE 147* 100 156* 148*     Current correctional scale  Medium  Maintain anti-hyperglycemic dose as follows-   Antihyperglycemics (From admission, onward)      Start     Stop Route Frequency Ordered    04/29/25 2100  insulin glargine U-100 (Lantus) pen 15 Units         -- SubQ Nightly 04/29/25 1016    04/29/25 1115  empagliflozin (Jardiance) tablet 10 mg        Question Answer Comment   Does this patient have a diagnosis of heart failure? Yes    Does this patient have type 1 diabetes or diabetic ketoacidosis? No    Does this patient have symptomatic hypotension? No    Is the patient NPO or pending major surgery in next 3 days or less? No        -- Oral Daily 04/29/25 1016    04/29/25 1113  insulin aspart U-100 pen 0-10 Units         -- SubQ Before meals & nightly PRN 04/29/25 1016          Hold Oral hypoglycemics while patient is in the hospital.    Blood glucose acceptable

## 2025-05-03 NOTE — ASSESSMENT & PLAN NOTE
\ Patient has a current diagnosis of hypertensive urgency (without evidence of end organ damage) which is uncontrolled.  Latest blood pressure and vitals reviewed-   Temp:  [97.6 °F (36.4 °C)-98.7 °F (37.1 °C)]   Pulse:  []   Resp:  [18]   BP: (132-225)/()   SpO2:  [95 %-100 %] .   Patient currently on IV antihypertensives.   Home meds for hypertension were reviewed and noted below.   Hypertension Medications              doxazosin (CARDURA) 4 MG tablet Take 1 tablet (4 mg total) by mouth once daily.    furosemide (LASIX) 40 MG tablet Take 2 tablets (80 mg total) by mouth daily as needed (for swelling).    hydrALAZINE (APRESOLINE) 100 MG tablet Take 1 tablet (100 mg total) by mouth every 8 (eight) hours.    hydroCHLOROthiazide 12.5 MG Tab Take 1 tablet (12.5 mg total) by mouth once daily.    valsartan (DIOVAN) 320 MG tablet Take 1 tablet (320 mg total) by mouth once daily.            5/3 SBP 200s overnight. started on IV hydralzine. received IV hydralazine 15mg x 2 doses. /84.

## 2025-05-03 NOTE — ASSESSMENT & PLAN NOTE
Patient has persistent (7 days or more) atrial fibrillation. Patient is currently in atrial fibrillation. [id number] Score: 3. The patients heart rate in the last 24 hours is as follows:  Pulse  Min: 76  Max: 105     Antiarrhythmics       Anticoagulants  rivaroxaban tablet 15 mg, With dinner, Oral    Plan  - Replete lytes with a goal of K>4, Mg >2  - Patient is anticoagulated, see medications listed above.  - Patient's afib is currently controlled

## 2025-05-03 NOTE — ASSESSMENT & PLAN NOTE
Anemia is likely due to chronic disease due to Chronic Kidney Disease. Most recent hemoglobin and hematocrit are listed below.  Recent Labs     05/01/25  0233 05/02/25  0215 05/03/25  0237   HGB 8.3* 8.5* 8.4*   HCT 28.0* 29.0* 29.1*     Plan  - Monitor serial CBC: Daily  - Transfuse PRBC if patient becomes hemodynamically unstable, symptomatic or H/H drops below 7/21.  - Patient has not received any PRBC transfusions to date  - Patient's anemia is currently stable

## 2025-05-03 NOTE — ASSESSMENT & PLAN NOTE
"Patient has Diastolic (HFpEF) heart failure that is Chronic. On presentation their CHF was well compensated. Most recent BNP and echo results are listed below.  No results for input(s): "BNP" in the last 72 hours.  Latest ECHO  Results for orders placed during the hospital encounter of 02/20/25    Echo    Interpretation Summary    Left Ventricle: There is mild concentric hypertrophy. There is low normal systolic function with a visually estimated ejection fraction of 50 - 55%. Grade III diastolic dysfunction.    Left Ventricle: The left ventricle is at the upper limits of normal in size. Mildly increased wall thickness. There is mild concentric hypertrophy. Regional wall motion abnormalities present. See diagram for wall motion findings. There is low normal systolic function with a visually estimated ejection fraction of 50 - 55%. Ejection fraction is approximately 53%. Grade III diastolic dysfunction.    Right Ventricle: Systolic function is borderline low despite the low TAPSE.    Left Atrium: Left atrium is moderately dilated.    Aortic Valve: The aortic valve is a trileaflet valve. There is mild aortic valve sclerosis. There is moderate annular calcification present. There is mild aortic regurgitation with a centrally directed jet.    Mitral Valve: There is mild regurgitation with a centrally directed jet.    Pericardium: There is a trivial posterior effusion and under the RA.    Tricuspid Valve: There is mild regurgitation.    Pulmonary Artery: The estimated pulmonary artery systolic pressure is 30 mmHg.    Current Heart Failure Medications  hydrALAZINE tablet 100 mg, Every 8 hours, Oral  empagliflozin (Jardiance) tablet 10 mg, Daily, Oral  hydrALAZINE injection 15 mg, Every 6 hours PRN, Intravenous  torsemide tablet 40 mg, Daily, Oral    Plan  - Monitor strict I&Os and daily weights.    - Place on telemetry  - Low sodium diet  - Cardiology has not been consulted  - The patient's volume status is at their " baseline

## 2025-05-04 LAB
ABSOLUTE EOSINOPHIL (OHS): 0.13 K/UL
ABSOLUTE MONOCYTE (OHS): 0.9 K/UL (ref 0.3–1)
ABSOLUTE NEUTROPHIL COUNT (OHS): 2.18 K/UL (ref 1.8–7.7)
ANION GAP (OHS): 11 MMOL/L (ref 8–16)
BACTERIA BLD CULT: NORMAL
BACTERIA BLD CULT: NORMAL
BASOPHILS # BLD AUTO: 0.04 K/UL
BASOPHILS NFR BLD AUTO: 0.9 %
BUN SERPL-MCNC: 43 MG/DL (ref 8–23)
CALCIUM SERPL-MCNC: 8.3 MG/DL (ref 8.7–10.5)
CHLORIDE SERPL-SCNC: 106 MMOL/L (ref 95–110)
CO2 SERPL-SCNC: 20 MMOL/L (ref 23–29)
CREAT SERPL-MCNC: 2.7 MG/DL (ref 0.5–1.4)
ERYTHROCYTE [DISTWIDTH] IN BLOOD BY AUTOMATED COUNT: 19.7 % (ref 11.5–14.5)
GFR SERPLBLD CREATININE-BSD FMLA CKD-EPI: 25 ML/MIN/1.73/M2
GLUCOSE SERPL-MCNC: 88 MG/DL (ref 70–110)
HCT VFR BLD AUTO: 29.8 % (ref 40–54)
HGB BLD-MCNC: 8.6 GM/DL (ref 14–18)
IMM GRANULOCYTES # BLD AUTO: 0.01 K/UL (ref 0–0.04)
IMM GRANULOCYTES NFR BLD AUTO: 0.2 % (ref 0–0.5)
LYMPHOCYTES # BLD AUTO: 1.09 K/UL (ref 1–4.8)
MAGNESIUM SERPL-MCNC: 1.9 MG/DL (ref 1.6–2.6)
MCH RBC QN AUTO: 22.3 PG (ref 27–31)
MCHC RBC AUTO-ENTMCNC: 28.9 G/DL (ref 32–36)
MCV RBC AUTO: 77 FL (ref 82–98)
NUCLEATED RBC (/100WBC) (OHS): 0 /100 WBC
OHS QRS DURATION: 114 MS
OHS QTC CALCULATION: 454 MS
PHOSPHATE SERPL-MCNC: 3.9 MG/DL (ref 2.7–4.5)
PLATELET # BLD AUTO: 115 K/UL (ref 150–450)
PMV BLD AUTO: ABNORMAL FL
POCT GLUCOSE: 108 MG/DL (ref 70–110)
POCT GLUCOSE: 160 MG/DL (ref 70–110)
POCT GLUCOSE: 177 MG/DL (ref 70–110)
POCT GLUCOSE: 234 MG/DL (ref 70–110)
POTASSIUM SERPL-SCNC: 3.4 MMOL/L (ref 3.5–5.1)
RBC # BLD AUTO: 3.85 M/UL (ref 4.6–6.2)
RELATIVE EOSINOPHIL (OHS): 3 %
RELATIVE LYMPHOCYTE (OHS): 25.1 % (ref 18–48)
RELATIVE MONOCYTE (OHS): 20.7 % (ref 4–15)
RELATIVE NEUTROPHIL (OHS): 50.1 % (ref 38–73)
SODIUM SERPL-SCNC: 137 MMOL/L (ref 136–145)
TACROLIMUS BLD-MCNC: 4.6 NG/ML (ref 5–15)
WBC # BLD AUTO: 4.35 K/UL (ref 3.9–12.7)

## 2025-05-04 PROCEDURE — 83735 ASSAY OF MAGNESIUM: CPT | Mod: HCNC | Performed by: STUDENT IN AN ORGANIZED HEALTH CARE EDUCATION/TRAINING PROGRAM

## 2025-05-04 PROCEDURE — 85025 COMPLETE CBC W/AUTO DIFF WBC: CPT | Mod: HCNC | Performed by: STUDENT IN AN ORGANIZED HEALTH CARE EDUCATION/TRAINING PROGRAM

## 2025-05-04 PROCEDURE — 80197 ASSAY OF TACROLIMUS: CPT | Mod: HCNC | Performed by: STUDENT IN AN ORGANIZED HEALTH CARE EDUCATION/TRAINING PROGRAM

## 2025-05-04 PROCEDURE — 84100 ASSAY OF PHOSPHORUS: CPT | Mod: HCNC | Performed by: STUDENT IN AN ORGANIZED HEALTH CARE EDUCATION/TRAINING PROGRAM

## 2025-05-04 PROCEDURE — 36415 COLL VENOUS BLD VENIPUNCTURE: CPT | Mod: HCNC | Performed by: STUDENT IN AN ORGANIZED HEALTH CARE EDUCATION/TRAINING PROGRAM

## 2025-05-04 PROCEDURE — 80048 BASIC METABOLIC PNL TOTAL CA: CPT | Mod: HCNC | Performed by: STUDENT IN AN ORGANIZED HEALTH CARE EDUCATION/TRAINING PROGRAM

## 2025-05-04 PROCEDURE — 63600175 PHARM REV CODE 636 W HCPCS: Mod: HCNC | Performed by: HOSPITALIST

## 2025-05-04 PROCEDURE — 21400001 HC TELEMETRY ROOM: Mod: HCNC

## 2025-05-04 PROCEDURE — 99233 SBSQ HOSP IP/OBS HIGH 50: CPT | Mod: HCNC,,, | Performed by: INTERNAL MEDICINE

## 2025-05-04 PROCEDURE — 25000003 PHARM REV CODE 250: Mod: HCNC | Performed by: STUDENT IN AN ORGANIZED HEALTH CARE EDUCATION/TRAINING PROGRAM

## 2025-05-04 PROCEDURE — 25000003 PHARM REV CODE 250: Mod: HCNC

## 2025-05-04 PROCEDURE — 63600175 PHARM REV CODE 636 W HCPCS: Mod: HCNC | Performed by: STUDENT IN AN ORGANIZED HEALTH CARE EDUCATION/TRAINING PROGRAM

## 2025-05-04 PROCEDURE — 63600175 PHARM REV CODE 636 W HCPCS: Mod: HCNC | Performed by: INTERNAL MEDICINE

## 2025-05-04 PROCEDURE — 25000003 PHARM REV CODE 250: Mod: HCNC | Performed by: HOSPITALIST

## 2025-05-04 RX ORDER — CLONIDINE HYDROCHLORIDE 0.1 MG/1
0.1 TABLET ORAL ONCE AS NEEDED
Status: COMPLETED | OUTPATIENT
Start: 2025-05-04 | End: 2025-05-05

## 2025-05-04 RX ORDER — POTASSIUM CHLORIDE 20 MEQ/1
40 TABLET, EXTENDED RELEASE ORAL ONCE
Status: COMPLETED | OUTPATIENT
Start: 2025-05-04 | End: 2025-05-04

## 2025-05-04 RX ORDER — INSULIN GLARGINE 100 [IU]/ML
15 INJECTION, SOLUTION SUBCUTANEOUS DAILY
Status: DISCONTINUED | OUTPATIENT
Start: 2025-05-05 | End: 2025-05-06

## 2025-05-04 RX ORDER — VALSARTAN 160 MG/1
320 TABLET ORAL DAILY
Status: DISCONTINUED | OUTPATIENT
Start: 2025-05-04 | End: 2025-05-07

## 2025-05-04 RX ADMIN — GABAPENTIN 300 MG: 300 CAPSULE ORAL at 01:05

## 2025-05-04 RX ADMIN — DOXAZOSIN 8 MG: 4 TABLET ORAL at 08:05

## 2025-05-04 RX ADMIN — TACROLIMUS 1 MG: 1 CAPSULE ORAL at 08:05

## 2025-05-04 RX ADMIN — POTASSIUM CHLORIDE 40 MEQ: 1500 TABLET, EXTENDED RELEASE ORAL at 10:05

## 2025-05-04 RX ADMIN — GABAPENTIN 300 MG: 300 CAPSULE ORAL at 08:05

## 2025-05-04 RX ADMIN — HYDRALAZINE HYDROCHLORIDE 100 MG: 50 TABLET ORAL at 08:05

## 2025-05-04 RX ADMIN — EMPAGLIFLOZIN 10 MG: 10 TABLET, FILM COATED ORAL at 08:05

## 2025-05-04 RX ADMIN — HYDRALAZINE HYDROCHLORIDE 100 MG: 50 TABLET ORAL at 05:05

## 2025-05-04 RX ADMIN — HYDRALAZINE HYDROCHLORIDE 100 MG: 50 TABLET ORAL at 01:05

## 2025-05-04 RX ADMIN — PREDNISONE 5 MG: 5 TABLET ORAL at 08:05

## 2025-05-04 RX ADMIN — TACROLIMUS 1 MG: 1 CAPSULE ORAL at 05:05

## 2025-05-04 RX ADMIN — ACETAMINOPHEN 1000 MG: 500 TABLET ORAL at 08:05

## 2025-05-04 RX ADMIN — CEFEPIME 2 G: 2 INJECTION, POWDER, FOR SOLUTION INTRAVENOUS at 10:05

## 2025-05-04 RX ADMIN — VALSARTAN 320 MG: 160 TABLET, FILM COATED ORAL at 10:05

## 2025-05-04 RX ADMIN — Medication 1 CAPSULE: at 08:05

## 2025-05-04 RX ADMIN — RIVAROXABAN 15 MG: 15 TABLET, FILM COATED ORAL at 05:05

## 2025-05-04 RX ADMIN — TORSEMIDE 40 MG: 20 TABLET ORAL at 08:05

## 2025-05-04 RX ADMIN — INSULIN ASPART 2 UNITS: 100 INJECTION, SOLUTION INTRAVENOUS; SUBCUTANEOUS at 09:05

## 2025-05-04 RX ADMIN — ATORVASTATIN CALCIUM 80 MG: 40 TABLET, FILM COATED ORAL at 08:05

## 2025-05-04 RX ADMIN — CEFEPIME 2 G: 2 INJECTION, POWDER, FOR SOLUTION INTRAVENOUS at 09:05

## 2025-05-04 NOTE — ASSESSMENT & PLAN NOTE
Patient has a diagnosis of pneumonia. The cause of the pneumonia is suspected to be bacterial in etiology but organism is not known. The pneumonia is stable. The patient has the following signs/symptoms of pneumonia: cough. The patient does not have a current oxygen requirement and the patient does not have a home oxygen requirement. I have reviewed the pertinent imaging. The following cultures have been collected: Blood cultures and Sputum culture The culture results are listed below.     Current antimicrobial regimen consists of the antibiotics listed below. Will monitor patient closely and continue current treatment plan unchanged.    -Given fevers/chills and recurrent pna, reasonable to continue below abx and deescalate when indicated  -f/u sputum, blood cx    Antibiotics (From admission, onward)      Start     Stop Route Frequency Ordered    04/30/25 0900  azithromycin (ZITHROMAX) 500 mg in 0.9% NaCl 250 mL IVPB (admixture device)         05/01/25 1259 IV Every 24 hours (non-standard times) 04/29/25 1016          Microbiology Results (last 7 days)       Procedure Component Value Units Date/Time    Blood culture x two cultures. Draw prior to antibiotics. [[id number]]  (Normal) Collected: 04/29/25 0758    Order Status: Completed Specimen: Blood from Peripheral, Forearm, Left Updated: 05/04/25 1801     Blood Culture No Growth After 5 Days    Blood culture x two cultures. Draw prior to antibiotics. [[id number]]  (Normal) Collected: 04/29/25 0758    Order Status: Completed Specimen: Blood from Peripheral, Antecubital, Left Updated: 05/04/25 1801     Blood Culture No Growth After 5 Days    Respiratory Infection Panel (PCR), Nasopharyngeal [[id number]] Collected: 05/02/25 1027    Order Status: Completed Specimen: Nasopharyngeal Swab Updated: 05/02/25 1545     Respiratory Infection Panel Source Nasopharyngeal Swab     Adenovirus Not Detected     Coronavirus 229E, Common Cold Virus Not Detected     Coronavirus HKU1,  Common Cold Virus Not Detected     Coronavirus NL63, Common Cold Virus Not Detected     Coronavirus OC43, Common Cold Virus Not Detected     SARS-CoV2 (COVID-19) Qualitative PCR Not Detected     Human Metapneumovirus Not Detected     Human Rhinovirus/Enterovirus Not Detected     Influenza A Not Detected     Influenza B Not Detected     Parainfluenza Virus 1 Not Detected     Parainfluenza Virus 2 Not Detected     Parainfluenza Virus 3 Not Detected     Parainfluenza Virus 4 Not Detected     Respiratory Syncytial Virus Not Detected     Bordetella Parapertussis ([id number]) Not Detected     Bordetella pertussis (ptxP) Not Detected     Chlamydia pneumoniae Not Detected     Mycoplasma pneumoniae Not Detected          5/1  BC x2 4/29 NGTD. vancomycins discontinued.  respiratory cultures pending.  CT chest ordered -RML GGO concerning for pneumonia possible hiatal hernia and possible anterior vertebral osteophyte near mid esophagus.  SLP consulted  and consider esophogram to assess for hiatal hernia and recurrent aspiration as a cause of repeat pneumonia. continue cefepime. completed azithromycin. denies hemoptysis this admission. last episode 2 weeks back. resumed prograf 1mg BID. continue prednisone. hold cellcept for now     Patient has a diagnosis of pneumonia. The cause of the pneumonia is suspected to be bacterial in etiology but organism is not known. The pneumonia is stable. The patient has the following signs/symptoms of pneumonia: cough. The patient does not have a current oxygen requirement and the patient does not have a home oxygen requirement. I have reviewed the pertinent imaging. The following cultures have been collected: Blood cultures and Sputum culture The culture results are listed below.     Current antimicrobial regimen consists of the antibiotics listed below. Will monitor patient closely and continue current treatment plan unchanged.    -Given fevers/chills and recurrent pna, reasonable to continue below  abx and deescalate when indicated  -f/u sputum, blood cx    Antibiotics (From admission, onward)      Start     Stop Route Frequency Ordered    04/30/25 0900  azithromycin (ZITHROMAX) 500 mg in 0.9% NaCl 250 mL IVPB (admixture device)         05/01/25 1259 IV Every 24 hours (non-standard times) 04/29/25 1016          Antibiotics (From admission, onward)      Start     Stop Route Frequency Ordered    04/30/25 0900  azithromycin (ZITHROMAX) 500 mg in 0.9% NaCl 250 mL IVPB (admixture device)         05/01/25 1259 IV Every 24 hours (non-standard times) 04/29/25 1016          5/1  PMH of DM, renal transplant 2016, CHFpEF, AF/AFL (PVI/PWI/CTI 6/2024), CVA, recurrent PNA who presents with fevers, chills.  States that symptoms are similar to last month when he was admitted for pneumonia. No other pna symptoms despite CXR changes.BC x2 4/29 NGTD. vancomycins discontinued.  respiratory cultures pending.  CT chest ordered -RML GGO concerning for pneumonia possible hiatal hernia and possible anterior vertebral osteophyte near mid esophagus.  SLP consulted  and  barium esophogram to assess for hiatal hernia (second episode of pneumonia since March 2025) and recurrent aspiration as a cause of repeat pneumonia. continue cefepime. completed azithromycin. denies hemoptysis this admission. last episode 2 weeks back. resumed prograf 1mg BID. continue prednisone. hold cellcept for now     Patient has a diagnosis of pneumonia. The cause of the pneumonia is suspected to be bacterial in etiology but organism is not known. The pneumonia is stable. The patient has the following signs/symptoms of pneumonia: cough. The patient does not have a current oxygen requirement and the patient does not have a home oxygen requirement. I have reviewed the pertinent imaging. The following cultures have been collected: Blood cultures and Sputum culture The culture results are listed below.     Current antimicrobial regimen consists of the antibiotics  listed below. Will monitor patient closely and continue current treatment plan unchanged.    -Given fevers/chills and recurrent pna, reasonable to continue below abx and deescalate when indicated  -f/u sputum, blood cx    Antibiotics (From admission, onward)      Start     Stop Route Frequency Ordered    04/30/25 0900  azithromycin (ZITHROMAX) 500 mg in 0.9% NaCl 250 mL IVPB (admixture device)         05/01/25 1259 IV Every 24 hours (non-standard times) 04/29/25 1016          Microbiology Results (last 7 days)       Procedure Component Value Units Date/Time    Blood culture x two cultures. Draw prior to antibiotics. [[id number]]  (Normal) Collected: 04/29/25 0758    Order Status: Completed Specimen: Blood from Peripheral, Forearm, Left Updated: 05/04/25 1801     Blood Culture No Growth After 5 Days    Blood culture x two cultures. Draw prior to antibiotics. [[id number]]  (Normal) Collected: 04/29/25 0758    Order Status: Completed Specimen: Blood from Peripheral, Antecubital, Left Updated: 05/04/25 1801     Blood Culture No Growth After 5 Days    Respiratory Infection Panel (PCR), Nasopharyngeal [[id number]] Collected: 05/02/25 1027    Order Status: Completed Specimen: Nasopharyngeal Swab Updated: 05/02/25 1545     Respiratory Infection Panel Source Nasopharyngeal Swab     Adenovirus Not Detected     Coronavirus 229E, Common Cold Virus Not Detected     Coronavirus HKU1, Common Cold Virus Not Detected     Coronavirus NL63, Common Cold Virus Not Detected     Coronavirus OC43, Common Cold Virus Not Detected     SARS-CoV2 (COVID-19) Qualitative PCR Not Detected     Human Metapneumovirus Not Detected     Human Rhinovirus/Enterovirus Not Detected     Influenza A Not Detected     Influenza B Not Detected     Parainfluenza Virus 1 Not Detected     Parainfluenza Virus 2 Not Detected     Parainfluenza Virus 3 Not Detected     Parainfluenza Virus 4 Not Detected     Respiratory Syncytial Virus Not Detected     Bordetella  Parapertussis ([id number]) Not Detected     Bordetella pertussis (ptxP) Not Detected     Chlamydia pneumoniae Not Detected     Mycoplasma pneumoniae Not Detected          5/1  BC x2 4/29 NGTD. vancomycins discontinued.  respiratory cultures pending.  CT chest ordered -L GGO concerning for pneumonia possible hiatal hernia and possible anterior vertebral osteophyte near mid esophagus.  SLP consulted  and consider esophogram to assess for hiatal hernia and recurrent aspiration as a cause of repeat pneumonia. continue cefepime. completed azithromycin. denies hemoptysis this admission. last episode 2 weeks back. resumed prograf 1mg BID. continue prednisone. hold cellcept for now     Patient has a diagnosis of pneumonia. The cause of the pneumonia is suspected to be bacterial in etiology but organism is not known. The pneumonia is stable. The patient has the following signs/symptoms of pneumonia: cough. The patient does not have a current oxygen requirement and the patient does not have a home oxygen requirement. I have reviewed the pertinent imaging. The following cultures have been collected: Blood cultures and Sputum culture The culture results are listed below.     Current antimicrobial regimen consists of the antibiotics listed below. Will monitor patient closely and continue current treatment plan unchanged.    -Given fevers/chills and recurrent pna, reasonable to continue below abx and deescalate when indicated  -f/u sputum, blood cx    Antibiotics (From admission, onward)      Start     Stop Route Frequency Ordered    04/30/25 0900  azithromycin (ZITHROMAX) 500 mg in 0.9% NaCl 250 mL IVPB (admixture device)         05/01/25 1259 IV Every 24 hours (non-standard times) 04/29/25 1016          Antibiotics (From admission, onward)      Start     Stop Route Frequency Ordered    04/30/25 0900  azithromycin (ZITHROMAX) 500 mg in 0.9% NaCl 250 mL IVPB (admixture device)         05/01/25 1259 IV Every 24 hours (non-standard  times) 04/29/25 1016            Patient has a diagnosis of pneumonia. The cause of the pneumonia is suspected to be bacterial in etiology but organism is not known. The pneumonia is stable. The patient has the following signs/symptoms of pneumonia: cough. The patient does not have a current oxygen requirement and the patient does not have a home oxygen requirement. I have reviewed the pertinent imaging. The following cultures have been collected: Blood cultures and Sputum culture The culture results are listed below.     Current antimicrobial regimen consists of the antibiotics listed below. Will monitor patient closely and continue current treatment plan unchanged.    -Given fevers/chills and recurrent pna, reasonable to continue below abx and deescalate when indicated  -f/u sputum, blood cx    Antibiotics (From admission, onward)      Start     Stop Route Frequency Ordered    04/30/25 0900  azithromycin (ZITHROMAX) 500 mg in 0.9% NaCl 250 mL IVPB (admixture device)         05/01/25 1259 IV Every 24 hours (non-standard times) 04/29/25 1016          Microbiology Results (last 7 days)       Procedure Component Value Units Date/Time    Blood culture x two cultures. Draw prior to antibiotics. [[id number]]  (Normal) Collected: 04/29/25 0758    Order Status: Completed Specimen: Blood from Peripheral, Forearm, Left Updated: 05/04/25 1801     Blood Culture No Growth After 5 Days    Blood culture x two cultures. Draw prior to antibiotics. [[id number]]  (Normal) Collected: 04/29/25 0758    Order Status: Completed Specimen: Blood from Peripheral, Antecubital, Left Updated: 05/04/25 1801     Blood Culture No Growth After 5 Days    Respiratory Infection Panel (PCR), Nasopharyngeal [[id number]] Collected: 05/02/25 1027    Order Status: Completed Specimen: Nasopharyngeal Swab Updated: 05/02/25 1545     Respiratory Infection Panel Source Nasopharyngeal Swab     Adenovirus Not Detected     Coronavirus 229E, Common Cold Virus Not  Detected     Coronavirus HKU1, Common Cold Virus Not Detected     Coronavirus NL63, Common Cold Virus Not Detected     Coronavirus OC43, Common Cold Virus Not Detected     SARS-CoV2 (COVID-19) Qualitative PCR Not Detected     Human Metapneumovirus Not Detected     Human Rhinovirus/Enterovirus Not Detected     Influenza A Not Detected     Influenza B Not Detected     Parainfluenza Virus 1 Not Detected     Parainfluenza Virus 2 Not Detected     Parainfluenza Virus 3 Not Detected     Parainfluenza Virus 4 Not Detected     Respiratory Syncytial Virus Not Detected     Bordetella Parapertussis (UH5781) Not Detected     Bordetella pertussis (ptxP) Not Detected     Chlamydia pneumoniae Not Detected     Mycoplasma pneumoniae Not Detected          5/1  BC x2 4/29 NGTD. vancomycins discontinued.  respiratory cultures pending.  CT chest ordered -RML GGO concerning for pneumonia possible hiatal hernia and possible anterior vertebral osteophyte near mid esophagus.  SLP consulted  and consider esophogram to assess for hiatal hernia and recurrent aspiration as a cause of repeat pneumonia. continue cefepime. completed azithromycin. denies hemoptysis this admission. last episode 2 weeks back. resumed prograf 1mg BID. continue prednisone. hold cellcept for now     Patient has a diagnosis of pneumonia. The cause of the pneumonia is suspected to be bacterial in etiology but organism is not known. The pneumonia is stable. The patient has the following signs/symptoms of pneumonia: cough. The patient does not have a current oxygen requirement and the patient does not have a home oxygen requirement. I have reviewed the pertinent imaging. The following cultures have been collected: Blood cultures and Sputum culture The culture results are listed below.     Current antimicrobial regimen consists of the antibiotics listed below. Will monitor patient closely and continue current treatment plan unchanged.    -Given fevers/chills and recurrent  pna, reasonable to continue below abx and deescalate when indicated  -f/u sputum, blood cx    Antibiotics (From admission, onward)      Start     Stop Route Frequency Ordered    04/30/25 0900  azithromycin (ZITHROMAX) 500 mg in 0.9% NaCl 250 mL IVPB (admixture device)         05/01/25 1259 IV Every 24 hours (non-standard times) 04/29/25 1016          Antibiotics (From admission, onward)      Start     Stop Route Frequency Ordered    04/30/25 0900  azithromycin (ZITHROMAX) 500 mg in 0.9% NaCl 250 mL IVPB (admixture device)         05/01/25 1259 IV Every 24 hours (non-standard times) 04/29/25 1016          5/1  PMH of DM, renal transplant 2016, CHFpEF, AF/AFL (PVI/PWI/CTI 6/2024), CVA, recurrent PNA who presents with fevers, chills.  States that symptoms are similar to last month when he was admitted for pneumonia. No other pna symptoms despite CXR changes.BC x2 4/29 NGTD. vancomycins discontinued.  respiratory cultures pending.  CT chest ordered -L GGO concerning for pneumonia possible hiatal hernia and possible anterior vertebral osteophyte near mid esophagus.  SLP consulted  and  barium esophogram to assess for hiatal hernia (second episode of pneumonia since March 2025) and recurrent aspiration as a cause of repeat pneumonia. continue cefepime. completed azithromycin. denies hemoptysis this admission. last episode 2 weeks back. resumed prograf 1mg BID. continue prednisone. hold cellcept for now     Patient has a diagnosis of pneumonia. The cause of the pneumonia is suspected to be bacterial in etiology but organism is not known. The pneumonia is stable. The patient has the following signs/symptoms of pneumonia: cough. The patient does not have a current oxygen requirement and the patient does not have a home oxygen requirement. I have reviewed the pertinent imaging. The following cultures have been collected: Blood cultures and Sputum culture The culture results are listed below.     Current antimicrobial  regimen consists of the antibiotics listed below. Will monitor patient closely and continue current treatment plan unchanged.    -Given fevers/chills and recurrent pna, reasonable to continue below abx and deescalate when indicated  -f/u sputum, blood cx    Antibiotics (From admission, onward)      Start     Stop Route Frequency Ordered    04/30/25 0900  azithromycin (ZITHROMAX) 500 mg in 0.9% NaCl 250 mL IVPB (admixture device)         05/01/25 1259 IV Every 24 hours (non-standard times) 04/29/25 1016          Microbiology Results (last 7 days)       Procedure Component Value Units Date/Time    Blood culture x two cultures. Draw prior to antibiotics. [[id number]]  (Normal) Collected: 04/29/25 0758    Order Status: Completed Specimen: Blood from Peripheral, Forearm, Left Updated: 05/04/25 1801     Blood Culture No Growth After 5 Days    Blood culture x two cultures. Draw prior to antibiotics. [[id number]]  (Normal) Collected: 04/29/25 0758    Order Status: Completed Specimen: Blood from Peripheral, Antecubital, Left Updated: 05/04/25 1801     Blood Culture No Growth After 5 Days    Respiratory Infection Panel (PCR), Nasopharyngeal [[id number]] Collected: 05/02/25 1027    Order Status: Completed Specimen: Nasopharyngeal Swab Updated: 05/02/25 1545     Respiratory Infection Panel Source Nasopharyngeal Swab     Adenovirus Not Detected     Coronavirus 229E, Common Cold Virus Not Detected     Coronavirus HKU1, Common Cold Virus Not Detected     Coronavirus NL63, Common Cold Virus Not Detected     Coronavirus OC43, Common Cold Virus Not Detected     SARS-CoV2 (COVID-19) Qualitative PCR Not Detected     Human Metapneumovirus Not Detected     Human Rhinovirus/Enterovirus Not Detected     Influenza A Not Detected     Influenza B Not Detected     Parainfluenza Virus 1 Not Detected     Parainfluenza Virus 2 Not Detected     Parainfluenza Virus 3 Not Detected     Parainfluenza Virus 4 Not Detected     Respiratory Syncytial  Virus Not Detected     Bordetella Parapertussis ([id number]) Not Detected     Bordetella pertussis (ptxP) Not Detected     Chlamydia pneumoniae Not Detected     Mycoplasma pneumoniae Not Detected          5/1  BC x2 4/29 NGTD. vancomycins discontinued.  respiratory cultures pending.  CT chest ordered -L GGO concerning for pneumonia possible hiatal hernia and possible anterior vertebral osteophyte near mid esophagus.  SLP consulted  and consider esophogram to assess for hiatal hernia and recurrent aspiration as a cause of repeat pneumonia. continue cefepime. completed azithromycin. denies hemoptysis this admission. last episode 2 weeks back. resumed prograf 1mg BID. continue prednisone. hold cellcept for now     Patient has a diagnosis of pneumonia. The cause of the pneumonia is suspected to be bacterial in etiology but organism is not known. The pneumonia is stable. The patient has the following signs/symptoms of pneumonia: cough. The patient does not have a current oxygen requirement and the patient does not have a home oxygen requirement. I have reviewed the pertinent imaging. The following cultures have been collected: Blood cultures and Sputum culture The culture results are listed below.     Current antimicrobial regimen consists of the antibiotics listed below. Will monitor patient closely and continue current treatment plan unchanged.    -Given fevers/chills and recurrent pna, reasonable to continue below abx and deescalate when indicated  -f/u sputum, blood cx    Antibiotics (From admission, onward)      Start     Stop Route Frequency Ordered    04/30/25 0900  azithromycin (ZITHROMAX) 500 mg in 0.9% NaCl 250 mL IVPB (admixture device)         05/01/25 1259 IV Every 24 hours (non-standard times) 04/29/25 1016          Antibiotics (From admission, onward)      Start     Stop Route Frequency Ordered    04/30/25 0900  azithromycin (ZITHROMAX) 500 mg in 0.9% NaCl 250 mL IVPB (admixture device)         05/01/25 1259  IV Every 24 hours (non-standard times) 04/29/25 1016

## 2025-05-04 NOTE — ASSESSMENT & PLAN NOTE
Patient has a diagnosis of pneumonia. The cause of the pneumonia is suspected to be bacterial in etiology but organism is not known. The pneumonia is stable. The patient has the following signs/symptoms of pneumonia: cough. The patient does not have a current oxygen requirement and the patient does not have a home oxygen requirement. I have reviewed the pertinent imaging. The following cultures have been collected: Blood cultures and Sputum culture The culture results are listed below.     Current antimicrobial regimen consists of the antibiotics listed below. Will monitor patient closely and continue current treatment plan unchanged.    -Given fevers/chills and recurrent pna, reasonable to continue below abx and deescalate when indicated  -f/u sputum, blood cx    Antibiotics (From admission, onward)      Start     Stop Route Frequency Ordered    05/02/25 1000  ceFEPIme injection 2 g         -- IV Every 12 hours (non-standard times) 05/02/25 0745    04/30/25 0900  azithromycin (ZITHROMAX) 500 mg in 0.9% NaCl 250 mL IVPB (admixture device)         05/01/25 1259 IV Every 24 hours (non-standard times) 04/29/25 1016          Microbiology Results (last 7 days)       Procedure Component Value Units Date/Time    Blood culture x two cultures. Draw prior to antibiotics. [[id number]]  (Normal) Collected: 04/29/25 0758    Order Status: Completed Specimen: Blood from Peripheral, Forearm, Left Updated: 05/03/25 1801     Blood Culture No Growth After 96 hours    Blood culture x two cultures. Draw prior to antibiotics. [[id number]]  (Normal) Collected: 04/29/25 0758    Order Status: Completed Specimen: Blood from Peripheral, Antecubital, Left Updated: 05/03/25 1801     Blood Culture No Growth After 96 hours    Respiratory Infection Panel (PCR), Nasopharyngeal [[id number]] Collected: 05/02/25 1027    Order Status: Completed Specimen: Nasopharyngeal Swab Updated: 05/02/25 1545     Respiratory Infection Panel Source Nasopharyngeal  Swab     Adenovirus Not Detected     Coronavirus 229E, Common Cold Virus Not Detected     Coronavirus HKU1, Common Cold Virus Not Detected     Coronavirus NL63, Common Cold Virus Not Detected     Coronavirus OC43, Common Cold Virus Not Detected     SARS-CoV2 (COVID-19) Qualitative PCR Not Detected     Human Metapneumovirus Not Detected     Human Rhinovirus/Enterovirus Not Detected     Influenza A Not Detected     Influenza B Not Detected     Parainfluenza Virus 1 Not Detected     Parainfluenza Virus 2 Not Detected     Parainfluenza Virus 3 Not Detected     Parainfluenza Virus 4 Not Detected     Respiratory Syncytial Virus Not Detected     Bordetella Parapertussis (KZ0003) Not Detected     Bordetella pertussis (ptxP) Not Detected     Chlamydia pneumoniae Not Detected     Mycoplasma pneumoniae Not Detected    MRSA Screen by PCR [[id number]]     Order Status: Sent Specimen: Nasal Swab     Culture, Respiratory with Gram Stain [[id number]]     Order Status: Sent Specimen: Respiratory     Culture, Respiratory with Gram Stain [[id number]]     Order Status: Sent Specimen: Respiratory     Influenza A & B by Molecular [[id number]]  (Normal) Collected: 04/29/25 0749    Order Status: Completed Specimen: Nasal Swab Updated: 04/29/25 0836     INFLUENZA A MOLECULAR Negative     INFLUENZA B MOLECULAR  Negative          5/1  BC x2 4/29 NGTD. vancomycins discontinued.  respiratory cultures pending.  CT chest ordered -RML GGO concerning for pneumonia possible hiatal hernia and possible anterior vertebral osteophyte near mid esophagus.  SLP consulted  and consider esophogram to assess for hiatal hernia and recurrent aspiration as a cause of repeat pneumonia. continue cefepime. completed azithromycin. denies hemoptysis this admission. last episode 2 weeks back. resumed prograf 1mg BID. continue prednisone. hold cellcept for now     Patient has a diagnosis of pneumonia. The cause of the pneumonia is suspected to be bacterial in etiology  but organism is not known. The pneumonia is stable. The patient has the following signs/symptoms of pneumonia: cough. The patient does not have a current oxygen requirement and the patient does not have a home oxygen requirement. I have reviewed the pertinent imaging. The following cultures have been collected: Blood cultures and Sputum culture The culture results are listed below.     Current antimicrobial regimen consists of the antibiotics listed below. Will monitor patient closely and continue current treatment plan unchanged.    -Given fevers/chills and recurrent pna, reasonable to continue below abx and deescalate when indicated  -f/u sputum, blood cx    Antibiotics (From admission, onward)      Start     Stop Route Frequency Ordered    05/02/25 1000  ceFEPIme injection 2 g         -- IV Every 12 hours (non-standard times) 05/02/25 0745    04/30/25 0900  azithromycin (ZITHROMAX) 500 mg in 0.9% NaCl 250 mL IVPB (admixture device)         05/01/25 1259 IV Every 24 hours (non-standard times) 04/29/25 1016          Antibiotics (From admission, onward)      Start     Stop Route Frequency Ordered    05/02/25 1000  ceFEPIme injection 2 g         -- IV Every 12 hours (non-standard times) 05/02/25 0745    04/30/25 0900  azithromycin (ZITHROMAX) 500 mg in 0.9% NaCl 250 mL IVPB (admixture device)         05/01/25 1259 IV Every 24 hours (non-standard times) 04/29/25 1016          5/1  PMH of DM, renal transplant 2016, CHFpEF, AF/AFL (PVI/PWI/CTI 6/2024), CVA, recurrent PNA who presents with fevers, chills.  States that symptoms are similar to last month when he was admitted for pneumonia. No other pna symptoms despite CXR changes.BC x2 4/29 NGTD. vancomycins discontinued.  respiratory cultures pending.  CT chest ordered -RML GGO concerning for pneumonia possible hiatal hernia and possible anterior vertebral osteophyte near mid esophagus.  SLP consulted  and  barium esophogram to assess for hiatal hernia (second episode of  pneumonia since March 2025) and recurrent aspiration as a cause of repeat pneumonia. continue cefepime. completed azithromycin. denies hemoptysis this admission. last episode 2 weeks back. resumed prograf 1mg BID. continue prednisone. hold cellcept for now     Patient has a diagnosis of pneumonia. The cause of the pneumonia is suspected to be bacterial in etiology but organism is not known. The pneumonia is stable. The patient has the following signs/symptoms of pneumonia: cough. The patient does not have a current oxygen requirement and the patient does not have a home oxygen requirement. I have reviewed the pertinent imaging. The following cultures have been collected: Blood cultures and Sputum culture The culture results are listed below.     Current antimicrobial regimen consists of the antibiotics listed below. Will monitor patient closely and continue current treatment plan unchanged.    -Given fevers/chills and recurrent pna, reasonable to continue below abx and deescalate when indicated  -f/u sputum, blood cx    Antibiotics (From admission, onward)      Start     Stop Route Frequency Ordered    05/02/25 1000  ceFEPIme injection 2 g         -- IV Every 12 hours (non-standard times) 05/02/25 0745    04/30/25 0900  azithromycin (ZITHROMAX) 500 mg in 0.9% NaCl 250 mL IVPB (admixture device)         05/01/25 1259 IV Every 24 hours (non-standard times) 04/29/25 1016          Microbiology Results (last 7 days)       Procedure Component Value Units Date/Time    Blood culture x two cultures. Draw prior to antibiotics. [[id number]]  (Normal) Collected: 04/29/25 0758    Order Status: Completed Specimen: Blood from Peripheral, Forearm, Left Updated: 05/03/25 1801     Blood Culture No Growth After 96 hours    Blood culture x two cultures. Draw prior to antibiotics. [[id number]]  (Normal) Collected: 04/29/25 0758    Order Status: Completed Specimen: Blood from Peripheral, Antecubital, Left Updated: 05/03/25 1801     Blood  Culture No Growth After 96 hours    Respiratory Infection Panel (PCR), Nasopharyngeal [[id number]] Collected: 05/02/25 1027    Order Status: Completed Specimen: Nasopharyngeal Swab Updated: 05/02/25 1545     Respiratory Infection Panel Source Nasopharyngeal Swab     Adenovirus Not Detected     Coronavirus 229E, Common Cold Virus Not Detected     Coronavirus HKU1, Common Cold Virus Not Detected     Coronavirus NL63, Common Cold Virus Not Detected     Coronavirus OC43, Common Cold Virus Not Detected     SARS-CoV2 (COVID-19) Qualitative PCR Not Detected     Human Metapneumovirus Not Detected     Human Rhinovirus/Enterovirus Not Detected     Influenza A Not Detected     Influenza B Not Detected     Parainfluenza Virus 1 Not Detected     Parainfluenza Virus 2 Not Detected     Parainfluenza Virus 3 Not Detected     Parainfluenza Virus 4 Not Detected     Respiratory Syncytial Virus Not Detected     Bordetella Parapertussis (AF9288) Not Detected     Bordetella pertussis (ptxP) Not Detected     Chlamydia pneumoniae Not Detected     Mycoplasma pneumoniae Not Detected    MRSA Screen by PCR [[id number]]     Order Status: Sent Specimen: Nasal Swab     Culture, Respiratory with Gram Stain [[id number]]     Order Status: Sent Specimen: Respiratory     Culture, Respiratory with Gram Stain [[id number]]     Order Status: Sent Specimen: Respiratory     Influenza A & B by Molecular [[id number]]  (Normal) Collected: 04/29/25 0749    Order Status: Completed Specimen: Nasal Swab Updated: 04/29/25 0836     INFLUENZA A MOLECULAR Negative     INFLUENZA B MOLECULAR  Negative          5/1  BC x2 4/29 NGTD. vancomycins discontinued.  respiratory cultures pending.  CT chest ordered -RML GGO concerning for pneumonia possible hiatal hernia and possible anterior vertebral osteophyte near mid esophagus.  SLP consulted  and consider esophogram to assess for hiatal hernia and recurrent aspiration as a cause of repeat pneumonia. continue cefepime.  completed azithromycin. denies hemoptysis this admission. last episode 2 weeks back. resumed prograf 1mg BID. continue prednisone. hold cellcept for now     Patient has a diagnosis of pneumonia. The cause of the pneumonia is suspected to be bacterial in etiology but organism is not known. The pneumonia is stable. The patient has the following signs/symptoms of pneumonia: cough. The patient does not have a current oxygen requirement and the patient does not have a home oxygen requirement. I have reviewed the pertinent imaging. The following cultures have been collected: Blood cultures and Sputum culture The culture results are listed below.     Current antimicrobial regimen consists of the antibiotics listed below. Will monitor patient closely and continue current treatment plan unchanged.    -Given fevers/chills and recurrent pna, reasonable to continue below abx and deescalate when indicated  -f/u sputum, blood cx    Antibiotics (From admission, onward)      Start     Stop Route Frequency Ordered    05/02/25 1000  ceFEPIme injection 2 g         -- IV Every 12 hours (non-standard times) 05/02/25 0745    04/30/25 0900  azithromycin (ZITHROMAX) 500 mg in 0.9% NaCl 250 mL IVPB (admixture device)         05/01/25 1259 IV Every 24 hours (non-standard times) 04/29/25 1016          Antibiotics (From admission, onward)      Start     Stop Route Frequency Ordered    05/02/25 1000  ceFEPIme injection 2 g         -- IV Every 12 hours (non-standard times) 05/02/25 0745    04/30/25 0900  azithromycin (ZITHROMAX) 500 mg in 0.9% NaCl 250 mL IVPB (admixture device)         05/01/25 1259 IV Every 24 hours (non-standard times) 04/29/25 1016            Patient has a diagnosis of pneumonia. The cause of the pneumonia is suspected to be bacterial in etiology but organism is not known. The pneumonia is stable. The patient has the following signs/symptoms of pneumonia: cough. The patient does not have a current oxygen requirement and the  patient does not have a home oxygen requirement. I have reviewed the pertinent imaging. The following cultures have been collected: Blood cultures and Sputum culture The culture results are listed below.     Current antimicrobial regimen consists of the antibiotics listed below. Will monitor patient closely and continue current treatment plan unchanged.    -Given fevers/chills and recurrent pna, reasonable to continue below abx and deescalate when indicated  -f/u sputum, blood cx    Antibiotics (From admission, onward)      Start     Stop Route Frequency Ordered    05/02/25 1000  ceFEPIme injection 2 g         -- IV Every 12 hours (non-standard times) 05/02/25 0745    04/30/25 0900  azithromycin (ZITHROMAX) 500 mg in 0.9% NaCl 250 mL IVPB (admixture device)         05/01/25 1259 IV Every 24 hours (non-standard times) 04/29/25 1016          Microbiology Results (last 7 days)       Procedure Component Value Units Date/Time    Blood culture x two cultures. Draw prior to antibiotics. [[id number]]  (Normal) Collected: 04/29/25 0758    Order Status: Completed Specimen: Blood from Peripheral, Forearm, Left Updated: 05/03/25 1801     Blood Culture No Growth After 96 hours    Blood culture x two cultures. Draw prior to antibiotics. [[id number]]  (Normal) Collected: 04/29/25 0758    Order Status: Completed Specimen: Blood from Peripheral, Antecubital, Left Updated: 05/03/25 1801     Blood Culture No Growth After 96 hours    Respiratory Infection Panel (PCR), Nasopharyngeal [[id number]] Collected: 05/02/25 1027    Order Status: Completed Specimen: Nasopharyngeal Swab Updated: 05/02/25 1545     Respiratory Infection Panel Source Nasopharyngeal Swab     Adenovirus Not Detected     Coronavirus 229E, Common Cold Virus Not Detected     Coronavirus HKU1, Common Cold Virus Not Detected     Coronavirus NL63, Common Cold Virus Not Detected     Coronavirus OC43, Common Cold Virus Not Detected     SARS-CoV2 (COVID-19) Qualitative PCR Not  Detected     Human Metapneumovirus Not Detected     Human Rhinovirus/Enterovirus Not Detected     Influenza A Not Detected     Influenza B Not Detected     Parainfluenza Virus 1 Not Detected     Parainfluenza Virus 2 Not Detected     Parainfluenza Virus 3 Not Detected     Parainfluenza Virus 4 Not Detected     Respiratory Syncytial Virus Not Detected     Bordetella Parapertussis (TM3105) Not Detected     Bordetella pertussis (ptxP) Not Detected     Chlamydia pneumoniae Not Detected     Mycoplasma pneumoniae Not Detected    MRSA Screen by PCR [[id number]]     Order Status: Sent Specimen: Nasal Swab     Culture, Respiratory with Gram Stain [[id number]]     Order Status: Sent Specimen: Respiratory     Culture, Respiratory with Gram Stain [[id number]]     Order Status: Sent Specimen: Respiratory     Influenza A & B by Molecular [[id number]]  (Normal) Collected: 04/29/25 0749    Order Status: Completed Specimen: Nasal Swab Updated: 04/29/25 0836     INFLUENZA A MOLECULAR Negative     INFLUENZA B MOLECULAR  Negative          5/1  BC x2 4/29 NGTD. vancomycins discontinued.  respiratory cultures pending.  CT chest ordered -RML GGO concerning for pneumonia possible hiatal hernia and possible anterior vertebral osteophyte near mid esophagus.  SLP consulted  and consider esophogram to assess for hiatal hernia and recurrent aspiration as a cause of repeat pneumonia. continue cefepime. completed azithromycin. denies hemoptysis this admission. last episode 2 weeks back. resumed prograf 1mg BID. continue prednisone. hold cellcept for now     Patient has a diagnosis of pneumonia. The cause of the pneumonia is suspected to be bacterial in etiology but organism is not known. The pneumonia is stable. The patient has the following signs/symptoms of pneumonia: cough. The patient does not have a current oxygen requirement and the patient does not have a home oxygen requirement. I have reviewed the pertinent imaging. The following  cultures have been collected: Blood cultures and Sputum culture The culture results are listed below.     Current antimicrobial regimen consists of the antibiotics listed below. Will monitor patient closely and continue current treatment plan unchanged.    -Given fevers/chills and recurrent pna, reasonable to continue below abx and deescalate when indicated  -f/u sputum, blood cx    Antibiotics (From admission, onward)      Start     Stop Route Frequency Ordered    05/02/25 1000  ceFEPIme injection 2 g         -- IV Every 12 hours (non-standard times) 05/02/25 0745    04/30/25 0900  azithromycin (ZITHROMAX) 500 mg in 0.9% NaCl 250 mL IVPB (admixture device)         05/01/25 1259 IV Every 24 hours (non-standard times) 04/29/25 1016          Antibiotics (From admission, onward)      Start     Stop Route Frequency Ordered    05/02/25 1000  ceFEPIme injection 2 g         -- IV Every 12 hours (non-standard times) 05/02/25 0745    04/30/25 0900  azithromycin (ZITHROMAX) 500 mg in 0.9% NaCl 250 mL IVPB (admixture device)         05/01/25 1259 IV Every 24 hours (non-standard times) 04/29/25 1016          5/1  PMH of DM, renal transplant 2016, CHFpEF, AF/AFL (PVI/PWI/CTI 6/2024), CVA, recurrent PNA who presents with fevers, chills.  States that symptoms are similar to last month when he was admitted for pneumonia. No other pna symptoms despite CXR changes.BC x2 4/29 NGTD. vancomycins discontinued.  respiratory cultures pending.  CT chest ordered -L GGO concerning for pneumonia possible hiatal hernia and possible anterior vertebral osteophyte near mid esophagus.  SLP consulted  and  barium esophogram to assess for hiatal hernia (second episode of pneumonia since March 2025) and recurrent aspiration as a cause of repeat pneumonia. continue cefepime. completed azithromycin. denies hemoptysis this admission. last episode 2 weeks back. resumed prograf 1mg BID. continue prednisone. hold cellcept for now     Patient has a  diagnosis of pneumonia. The cause of the pneumonia is suspected to be bacterial in etiology but organism is not known. The pneumonia is stable. The patient has the following signs/symptoms of pneumonia: cough. The patient does not have a current oxygen requirement and the patient does not have a home oxygen requirement. I have reviewed the pertinent imaging. The following cultures have been collected: Blood cultures and Sputum culture The culture results are listed below.     Current antimicrobial regimen consists of the antibiotics listed below. Will monitor patient closely and continue current treatment plan unchanged.    -Given fevers/chills and recurrent pna, reasonable to continue below abx and deescalate when indicated  -f/u sputum, blood cx    Antibiotics (From admission, onward)      Start     Stop Route Frequency Ordered    05/02/25 1000  ceFEPIme injection 2 g         -- IV Every 12 hours (non-standard times) 05/02/25 0745    04/30/25 0900  azithromycin (ZITHROMAX) 500 mg in 0.9% NaCl 250 mL IVPB (admixture device)         05/01/25 1259 IV Every 24 hours (non-standard times) 04/29/25 1016          Microbiology Results (last 7 days)       Procedure Component Value Units Date/Time    Blood culture x two cultures. Draw prior to antibiotics. [[id number]]  (Normal) Collected: 04/29/25 0758    Order Status: Completed Specimen: Blood from Peripheral, Forearm, Left Updated: 05/03/25 1801     Blood Culture No Growth After 96 hours    Blood culture x two cultures. Draw prior to antibiotics. [[id number]]  (Normal) Collected: 04/29/25 0758    Order Status: Completed Specimen: Blood from Peripheral, Antecubital, Left Updated: 05/03/25 1801     Blood Culture No Growth After 96 hours    Respiratory Infection Panel (PCR), Nasopharyngeal [[id number]] Collected: 05/02/25 1027    Order Status: Completed Specimen: Nasopharyngeal Swab Updated: 05/02/25 1545     Respiratory Infection Panel Source Nasopharyngeal Swab      Adenovirus Not Detected     Coronavirus 229E, Common Cold Virus Not Detected     Coronavirus HKU1, Common Cold Virus Not Detected     Coronavirus NL63, Common Cold Virus Not Detected     Coronavirus OC43, Common Cold Virus Not Detected     SARS-CoV2 (COVID-19) Qualitative PCR Not Detected     Human Metapneumovirus Not Detected     Human Rhinovirus/Enterovirus Not Detected     Influenza A Not Detected     Influenza B Not Detected     Parainfluenza Virus 1 Not Detected     Parainfluenza Virus 2 Not Detected     Parainfluenza Virus 3 Not Detected     Parainfluenza Virus 4 Not Detected     Respiratory Syncytial Virus Not Detected     Bordetella Parapertussis (YQ1821) Not Detected     Bordetella pertussis (ptxP) Not Detected     Chlamydia pneumoniae Not Detected     Mycoplasma pneumoniae Not Detected    MRSA Screen by PCR [[id number]]     Order Status: Sent Specimen: Nasal Swab     Culture, Respiratory with Gram Stain [[id number]]     Order Status: Sent Specimen: Respiratory     Culture, Respiratory with Gram Stain [[id number]]     Order Status: Sent Specimen: Respiratory     Influenza A & B by Molecular [[id number]]  (Normal) Collected: 04/29/25 0749    Order Status: Completed Specimen: Nasal Swab Updated: 04/29/25 0836     INFLUENZA A MOLECULAR Negative     INFLUENZA B MOLECULAR  Negative          5/1  BC x2 4/29 NGTD. vancomycins discontinued.  respiratory cultures pending.  CT chest ordered -RML GGO concerning for pneumonia possible hiatal hernia and possible anterior vertebral osteophyte near mid esophagus.  SLP consulted  and consider esophogram to assess for hiatal hernia and recurrent aspiration as a cause of repeat pneumonia. continue cefepime. completed azithromycin. denies hemoptysis this admission. last episode 2 weeks back. resumed prograf 1mg BID. continue prednisone. hold cellcept for now     Patient has a diagnosis of pneumonia. The cause of the pneumonia is suspected to be bacterial in etiology but  organism is not known. The pneumonia is stable. The patient has the following signs/symptoms of pneumonia: cough. The patient does not have a current oxygen requirement and the patient does not have a home oxygen requirement. I have reviewed the pertinent imaging. The following cultures have been collected: Blood cultures and Sputum culture The culture results are listed below.     Current antimicrobial regimen consists of the antibiotics listed below. Will monitor patient closely and continue current treatment plan unchanged.    -Given fevers/chills and recurrent pna, reasonable to continue below abx and deescalate when indicated  -f/u sputum, blood cx    Antibiotics (From admission, onward)      Start     Stop Route Frequency Ordered    05/02/25 1000  ceFEPIme injection 2 g         -- IV Every 12 hours (non-standard times) 05/02/25 0745    04/30/25 0900  azithromycin (ZITHROMAX) 500 mg in 0.9% NaCl 250 mL IVPB (admixture device)         05/01/25 1259 IV Every 24 hours (non-standard times) 04/29/25 1016          Antibiotics (From admission, onward)      Start     Stop Route Frequency Ordered    05/02/25 1000  ceFEPIme injection 2 g         -- IV Every 12 hours (non-standard times) 05/02/25 0745    04/30/25 0900  azithromycin (ZITHROMAX) 500 mg in 0.9% NaCl 250 mL IVPB (admixture device)         05/01/25 1259 IV Every 24 hours (non-standard times) 04/29/25 1016

## 2025-05-04 NOTE — PROGRESS NOTES
"[facility]  Infectious Disease  Progress Note    Patient Name: Ravi Zamorano  MRN: [id number]  Admission Date: 4/29/2025  Length of Stay: 5 days  Attending Physician: Cliff Guaman MD  Primary Care Provider: Mima Mack MD    Isolation Status: No active isolations  Assessment/Plan:      Pulmonary  * Community acquired bacterial pneumonia  69M with PMH of kidney transplant 2016, presents with fever, chills, cough, has LINDSYA multilobar consolidations and GGO and mod R effusion on CT chest. He has been afebrile, on room air, no leukocytosis. 4/29 BCX NG. RIP negative. RCX and MRSA nares sent, urine legionella in process. Currently on cefepime.     Recommendations  Continue cefepime for 7 day course - end date 5/5/25  Follow up urine legionella        Anticipated Disposition: TBD    Thank you for your consult. I will sign off. Please contact us if you have any additional questions.    Liberty Olson DO  Infectious Disease  [facility] - [facility]    Subjective:     Principal Problem:Community acquired bacterial pneumonia    HPI: Mr. Zamorano is a 69M with PMH of kidney transplant 2016, DM2, HFpEF, afib/flutter, previous CVA, presents with fever, chills, cough. Infectious disease consulted for "recurrent pneumonia/ immunosuppressed/ hemoptysis".     He has been afebrile, on room air, no leukocytosis. 4/29 BCX NG. RCX and MRSA nares sent. CT chest showing LINDSAY multilobar consolidations and GGOs, and mod R effusion. Currently on cefepime.   Interval History: feeling well today, no new symptoms    Review of Systems   Constitutional:  Negative for chills and fever.   Respiratory:  Negative for cough and shortness of breath.    Cardiovascular:  Negative for chest pain.   Gastrointestinal:  Negative for abdominal pain, constipation, diarrhea, nausea and vomiting.   Musculoskeletal:  Negative for arthralgias and myalgias.   Skin:  Negative for rash.   Neurological:  Negative for headaches.     Objective: "     Vital Signs (Most Recent):  Temp: 98.3 °F (36.8 °C) (05/04/25 0737)  Pulse: 79 (05/04/25 0737)  Resp: 18 (05/04/25 0737)  BP: (!) 171/77 (05/04/25 0737)  SpO2: 97 % (05/04/25 0737) Vital Signs (24h Range):  Temp:  [97.7 °F (36.5 °C)-98.3 °F (36.8 °C)] 98.3 °F (36.8 °C)  Pulse:  [] 79  Resp:  [18-19] 18  SpO2:  [97 %-99 %] 97 %  BP: (132-205)/(67-91) 171/77     Weight: 90.7 kg (200 lb)  Body mass index is 26.39 kg/m².    Estimated Creatinine Clearance: 30 mL/min (A) (based on SCr of 2.7 mg/dL (H)).     Physical Exam  Vitals reviewed.   Constitutional:       General: He is not in acute distress.     Appearance: Normal appearance. He is not ill-appearing.   HENT:      Head: Normocephalic and atraumatic.   Eyes:      Extraocular Movements: Extraocular movements intact.      Conjunctiva/sclera: Conjunctivae normal.   Cardiovascular:      Rate and Rhythm: Normal rate and regular rhythm.      Heart sounds: No murmur heard.  Pulmonary:      Effort: Pulmonary effort is normal. No respiratory distress.      Breath sounds: Normal breath sounds.   Abdominal:      General: Abdomen is flat. Bowel sounds are normal.      Palpations: Abdomen is soft.      Tenderness: There is no abdominal tenderness.   Musculoskeletal:      Cervical back: Normal range of motion.   Skin:     General: Skin is warm and dry.   Neurological:      General: No focal deficit present.      Mental Status: He is alert and oriented to person, place, and time.   Psychiatric:         Mood and Affect: Mood normal.         Behavior: Behavior normal.         Thought Content: Thought content normal.          Significant Labs: All pertinent labs within the past 24 hours have been reviewed.  Recent Lab Results  (Last 5 results in the past 24 hours)        05/04/25  0817   05/04/25  0233   05/03/25  2059   05/03/25  1638   05/03/25  1627        Albumin         2.6       Anion Gap   11       8       Baso #   0.04             Basophil %   0.9             BUN    43       43       Calcium   8.3       8.5       Chloride   106       106       CO2   20       23       Creatinine   2.7       2.8       eGFR   25  Comment: Estimated GFR calculated using the CKD-EPI creatinine (2021) equation.       24  Comment: Estimated GFR calculated using the CKD-EPI creatinine (2021) equation.       Eos #   0.13             Eos %   3.0             Glucose   88       130       Gran # (ANC)   2.18             Hematocrit   29.8             Hemoglobin   8.6             Immature Grans (Abs)   0.01  Comment: Mild elevation in immature granulocytes is non specific and can be seen in a variety of conditions including stress response, acute inflammation, trauma and pregnancy. Correlation with other laboratory and clinical findings is essential.             Immature Granulocytes   0.2             Lymph #   1.09             Lymph %   25.1             Magnesium    1.9             MCH   22.3             MCHC   28.9             MCV   77             Mono #   0.90             Mono %   20.7             MPV     Comment: Result Not Available             Neut %   50.1             nRBC   0             Phosphorus Level   3.9       3.6       Platelet Count   115             POCT Glucose 160     115   148         Potassium   3.4       4.1       RBC   3.85             RDW   19.7             Sodium   137       137       Tacrolimus Lvl   4.6  Comment: Testing performed by a chemiluminescent microparticle   immunoassay on the [employer] i System.    CAUTION: No firm therapeutic range exists for tacrolimus in whole blood. The complexity of the clinical state, individual differences in sensitivity to immunosuppressive and nephrotoxic effects of tacrolimus, co-administration of other immunosuppressants, type of transplant, time post-transplant and a number of other factors contribute to different requirements for optimal blood levels of tacrolimus. Therefore, individual tacrolimus values cannot be used as the sole  indicator for making changes in treatment regimen and each patient should be thoroughly evaluated clinically before changes in treatment regimens are made. Each user must establish his or her own ranges based on clinical experience.  Therapeutic ranges vary according to the commercial test used, and therefore should be established for each commercial test. Values obtained with different assay methods cannot be used interchangeably due to differences in assay methods and cross-reactivity with metabolites, nor should correction factors be applied. Therefore, consistent use of one assay for individual patients is recommended.               Troponin I High Sensitivity         78       WBC   4.35                                    Significant Imaging: I have reviewed all pertinent imaging results/findings within the past 24 hours.

## 2025-05-04 NOTE — PLAN OF CARE
Problem: Adult Inpatient Plan of Care  Goal: Plan of Care Review  Outcome: Progressing  Goal: Patient-Specific Goal (Individualized)  Outcome: Progressing  Goal: Absence of Hospital-Acquired Illness or Injury  Outcome: Progressing  Goal: Optimal Comfort and Wellbeing  Outcome: Progressing  Goal: Readiness for Transition of Care  Outcome: Progressing     Problem: Infection  Goal: Absence of Infection Signs and Symptoms  Outcome: Progressing     Problem: Acute Kidney Injury/Impairment  Goal: Fluid and Electrolyte Balance  Outcome: Progressing  Goal: Improved Oral Intake  Outcome: Progressing  Goal: Effective Renal Function  Outcome: Progressing     Problem: Pneumonia  Goal: Fluid Balance  Outcome: Progressing  Goal: Resolution of Infection Signs and Symptoms  Outcome: Progressing  Goal: Effective Oxygenation and Ventilation  Outcome: Progressing     Problem: Diabetes Comorbidity  Goal: Blood Glucose Level Within Targeted Range  Outcome: Progressing     Problem: Fall Injury Risk  Goal: Absence of Fall and Fall-Related Injury  Outcome: Progressing

## 2025-05-04 NOTE — ASSESSMENT & PLAN NOTE
Creatine stable for now. BMP reviewed- noted Estimated Creatinine Clearance: 23.8 mL/min (A) (based on SCr of 3.4 mg/dL (H)). according to latest data. Based on current GFR, CKD stage is stage 3 - GFR 30-59.  Monitor UOP and serial BMP and adjust therapy as needed. Renally dose meds. Avoid nephrotoxic medications and procedures.    Recent Labs   Lab 05/05/25  0513 05/06/25  0644 05/07/25  0328   BUN 46* 45* 49*   CREATININE 2.8* 2.9* 3.4*       I & O     Intake/Output Summary (Last 24 hours) at 5/7/2025 1059  Last data filed at 5/7/2025 0603  Gross per 24 hour   Intake 720 ml   Output --   Net 720 ml        5/3 KTM f/u - Cr elevated from his baseline - suspicion for failing allograft and approaching need for dialysis .  hold valsartan for now. monitor BP

## 2025-05-04 NOTE — ASSESSMENT & PLAN NOTE
Patient has a current diagnosis of hypertensive urgency (without evidence of end organ damage) which is uncontrolled.  Latest blood pressure and vitals reviewed-   Temp:  [97.7 °F (36.5 °C)-98.3 °F (36.8 °C)]   Pulse:  []   Resp:  [18-19]   BP: (139-205)/(71-91)   SpO2:  [97 %-99 %] .   Patient currently on IV antihypertensives.   Home meds for hypertension were reviewed and noted below.   Hypertension Medications              doxazosin (CARDURA) 4 MG tablet Take 1 tablet (4 mg total) by mouth once daily.    furosemide (LASIX) 40 MG tablet Take 2 tablets (80 mg total) by mouth daily as needed (for swelling).    hydrALAZINE (APRESOLINE) 100 MG tablet Take 1 tablet (100 mg total) by mouth every 8 (eight) hours.    hydroCHLOROthiazide 12.5 MG Tab Take 1 tablet (12.5 mg total) by mouth once daily.    valsartan (DIOVAN) 320 MG tablet Take 1 tablet (320 mg total) by mouth once daily.            5/3 SBP 200s overnight. started on IV hydralzine. received IV hydralazine 15mg x 2 doses. /84.   5/4 SBP 200s overnight. received hydralazine 15mg x 1 overnight. discussed with KTM. Cr stable. resume valsartan. Clonidine 0.1 mg PRN for SBP > 170mm HG

## 2025-05-04 NOTE — ASSESSMENT & PLAN NOTE
Anemia is likely due to chronic disease due to Chronic Kidney Disease. Most recent hemoglobin and hematocrit are listed below.  Recent Labs     05/02/25  0215 05/03/25  0237 05/04/25  0233   HGB 8.5* 8.4* 8.6*   HCT 29.0* 29.1* 29.8*     Plan  - Monitor serial CBC: Daily  - Transfuse PRBC if patient becomes hemodynamically unstable, symptomatic or H/H drops below 7/21.  - Patient has not received any PRBC transfusions to date  - Patient's anemia is currently stable

## 2025-05-04 NOTE — ASSESSMENT & PLAN NOTE
Patient's FSGs are controlled on current medication regimen.  Last A1c reviewed-   Lab Results   Component Value Date    HGBA1C 8.3 (H) 03/27/2025     Most recent fingerstick glucose reviewed-   Recent Labs   Lab 05/03/25  1638 05/03/25 2059 05/04/25  0817   POCTGLUCOSE 148* 115* 160*     Current correctional scale  Medium  Maintain anti-hyperglycemic dose as follows-   Antihyperglycemics (From admission, onward)      Start     Stop Route Frequency Ordered    04/29/25 2100  insulin glargine U-100 (Lantus) pen 15 Units         -- SubQ Nightly 04/29/25 1016    04/29/25 1115  empagliflozin (Jardiance) tablet 10 mg        Question Answer Comment   Does this patient have a diagnosis of heart failure? Yes    Does this patient have type 1 diabetes or diabetic ketoacidosis? No    Does this patient have symptomatic hypotension? No    Is the patient NPO or pending major surgery in next 3 days or less? No        -- Oral Daily 04/29/25 1016    04/29/25 1113  insulin aspart U-100 pen 0-10 Units         -- SubQ Before meals & nightly PRN 04/29/25 1016          Hold Oral hypoglycemics while patient is in the hospital.    Blood glucose acceptable

## 2025-05-04 NOTE — ASSESSMENT & PLAN NOTE
"Patient has Diastolic (HFpEF) heart failure that is Chronic. On presentation their CHF was well compensated. Most recent BNP and echo results are listed below.  No results for input(s): "BNP" in the last 72 hours.  Latest ECHO  Results for orders placed during the hospital encounter of 02/20/25    Echo    Interpretation Summary    Left Ventricle: There is mild concentric hypertrophy. There is low normal systolic function with a visually estimated ejection fraction of 50 - 55%. Grade III diastolic dysfunction.    Left Ventricle: The left ventricle is at the upper limits of normal in size. Mildly increased wall thickness. There is mild concentric hypertrophy. Regional wall motion abnormalities present. See diagram for wall motion findings. There is low normal systolic function with a visually estimated ejection fraction of 50 - 55%. Ejection fraction is approximately 53%. Grade III diastolic dysfunction.    Right Ventricle: Systolic function is borderline low despite the low TAPSE.    Left Atrium: Left atrium is moderately dilated.    Aortic Valve: The aortic valve is a trileaflet valve. There is mild aortic valve sclerosis. There is moderate annular calcification present. There is mild aortic regurgitation with a centrally directed jet.    Mitral Valve: There is mild regurgitation with a centrally directed jet.    Pericardium: There is a trivial posterior effusion and under the RA.    Tricuspid Valve: There is mild regurgitation.    Pulmonary Artery: The estimated pulmonary artery systolic pressure is 30 mmHg.    Current Heart Failure Medications  hydrALAZINE tablet 100 mg, Every 8 hours, Oral  empagliflozin (Jardiance) tablet 10 mg, Daily, Oral  hydrALAZINE injection 15 mg, Every 6 hours PRN, Intravenous  eplerenone tablet 50 mg, Daily, Oral  eplerenone (INSPRA) tablet, Daily, Oral  valsartan tablet 320 mg, Daily, Oral  hydroCHLOROthiazide tablet 25 mg, Daily, Oral  hydrochlorothiazide (HYDRODIURIL) tablet, Daily, " Oral    Plan  - Monitor strict I&Os and daily weights.    - Place on telemetry  - Low sodium diet  - Cardiology has not been consulted  - The patient's volume status is at their baseline

## 2025-05-04 NOTE — ASSESSMENT & PLAN NOTE
Patient has a current diagnosis of hypertensive urgency (without evidence of end organ damage) which is uncontrolled.  Latest blood pressure and vitals reviewed-   Temp:  [97.5 °F (36.4 °C)-98.4 °F (36.9 °C)]   Pulse:  [75-88]   Resp:  [16-20]   BP: (127-163)/(69-87)   SpO2:  [98 %-100 %] .   Patient currently on IV antihypertensives.   Home meds for hypertension were reviewed and noted below.   Hypertension Medications              doxazosin (CARDURA) 4 MG tablet Take 1 tablet (4 mg total) by mouth once daily.    furosemide (LASIX) 40 MG tablet Take 2 tablets (80 mg total) by mouth daily as needed (for swelling).    hydrALAZINE (APRESOLINE) 100 MG tablet Take 1 tablet (100 mg total) by mouth every 8 (eight) hours.    hydroCHLOROthiazide 12.5 MG Tab Take 1 tablet (12.5 mg total) by mouth once daily.    valsartan (DIOVAN) 320 MG tablet Take 1 tablet (320 mg total) by mouth once daily.            5/3 SBP 200s overnight. started on IV hydralzine. received IV hydralazine 15mg x 2 doses. /84.   5/4 SBP 200s overnight. received hydralazine 15mg x 1 overnight. discussed with KTM. Cr stable. resume valsartan. Clonidine 0.1 mg PRN for SBP > 170mm HG

## 2025-05-04 NOTE — ASSESSMENT & PLAN NOTE
Patient's most recent potassium results are listed below.   Recent Labs     05/05/25  0513 05/06/25  0644 05/07/25  0328   K 3.9 3.4* 3.5     Plan  - Replete potassium per protocol  - Monitor potassium Daily  - Patient's hypokalemia is stable

## 2025-05-04 NOTE — ASSESSMENT & PLAN NOTE
Patient has a diagnosis of pneumonia. The cause of the pneumonia is suspected to be bacterial in etiology but organism is not known. The pneumonia is stable. The patient has the following signs/symptoms of pneumonia: cough. The patient does not have a current oxygen requirement and the patient does not have a home oxygen requirement. I have reviewed the pertinent imaging. The following cultures have been collected: Blood cultures and Sputum culture The culture results are listed below.     Current antimicrobial regimen consists of the antibiotics listed below. Will monitor patient closely and continue current treatment plan unchanged.    -Given fevers/chills and recurrent pna, reasonable to continue below abx and deescalate when indicated  -f/u sputum, blood cx    Antibiotics (From admission, onward)      Start     Stop Route Frequency Ordered    04/30/25 0900  azithromycin (ZITHROMAX) 500 mg in 0.9% NaCl 250 mL IVPB (admixture device)         05/01/25 1259 IV Every 24 hours (non-standard times) 04/29/25 1016          Microbiology Results (last 7 days)       Procedure Component Value Units Date/Time    Blood culture x two cultures. Draw prior to antibiotics. [[id number]]  (Normal) Collected: 04/29/25 0758    Order Status: Completed Specimen: Blood from Peripheral, Forearm, Left Updated: 05/04/25 1801     Blood Culture No Growth After 5 Days    Blood culture x two cultures. Draw prior to antibiotics. [[id number]]  (Normal) Collected: 04/29/25 0758    Order Status: Completed Specimen: Blood from Peripheral, Antecubital, Left Updated: 05/04/25 1801     Blood Culture No Growth After 5 Days    Respiratory Infection Panel (PCR), Nasopharyngeal [[id number]] Collected: 05/02/25 1027    Order Status: Completed Specimen: Nasopharyngeal Swab Updated: 05/02/25 1545     Respiratory Infection Panel Source Nasopharyngeal Swab     Adenovirus Not Detected     Coronavirus 229E, Common Cold Virus Not Detected     Coronavirus HKU1,  Common Cold Virus Not Detected     Coronavirus NL63, Common Cold Virus Not Detected     Coronavirus OC43, Common Cold Virus Not Detected     SARS-CoV2 (COVID-19) Qualitative PCR Not Detected     Human Metapneumovirus Not Detected     Human Rhinovirus/Enterovirus Not Detected     Influenza A Not Detected     Influenza B Not Detected     Parainfluenza Virus 1 Not Detected     Parainfluenza Virus 2 Not Detected     Parainfluenza Virus 3 Not Detected     Parainfluenza Virus 4 Not Detected     Respiratory Syncytial Virus Not Detected     Bordetella Parapertussis (YM0965) Not Detected     Bordetella pertussis (ptxP) Not Detected     Chlamydia pneumoniae Not Detected     Mycoplasma pneumoniae Not Detected          5/1  BC x2 4/29 NGTD. vancomycins discontinued.  respiratory cultures pending.  CT chest ordered -RML GGO concerning for pneumonia possible hiatal hernia and possible anterior vertebral osteophyte near mid esophagus.  SLP consulted  and consider esophogram to assess for hiatal hernia and recurrent aspiration as a cause of repeat pneumonia. continue cefepime. completed azithromycin. denies hemoptysis this admission. last episode 2 weeks back. resumed prograf 1mg BID. continue prednisone. hold cellcept for now     Patient has a diagnosis of pneumonia. The cause of the pneumonia is suspected to be bacterial in etiology but organism is not known. The pneumonia is stable. The patient has the following signs/symptoms of pneumonia: cough. The patient does not have a current oxygen requirement and the patient does not have a home oxygen requirement. I have reviewed the pertinent imaging. The following cultures have been collected: Blood cultures and Sputum culture The culture results are listed below.     Current antimicrobial regimen consists of the antibiotics listed below. Will monitor patient closely and continue current treatment plan unchanged.    -Given fevers/chills and recurrent pna, reasonable to continue below  abx and deescalate when indicated  -f/u sputum, blood cx    Antibiotics (From admission, onward)      Start     Stop Route Frequency Ordered    04/30/25 0900  azithromycin (ZITHROMAX) 500 mg in 0.9% NaCl 250 mL IVPB (admixture device)         05/01/25 1259 IV Every 24 hours (non-standard times) 04/29/25 1016          Antibiotics (From admission, onward)      Start     Stop Route Frequency Ordered    04/30/25 0900  azithromycin (ZITHROMAX) 500 mg in 0.9% NaCl 250 mL IVPB (admixture device)         05/01/25 1259 IV Every 24 hours (non-standard times) 04/29/25 1016          5/1  PMH of DM, renal transplant 2016, CHFpEF, AF/AFL (PVI/PWI/CTI 6/2024), CVA, recurrent PNA who presents with fevers, chills.  States that symptoms are similar to last month when he was admitted for pneumonia. No other pna symptoms despite CXR changes.BC x2 4/29 NGTD. vancomycins discontinued.  respiratory cultures pending.  CT chest ordered -RML GGO concerning for pneumonia possible hiatal hernia and possible anterior vertebral osteophyte near mid esophagus.  SLP consulted  and  barium esophogram to assess for hiatal hernia (second episode of pneumonia since March 2025) and recurrent aspiration as a cause of repeat pneumonia. continue cefepime. completed azithromycin. denies hemoptysis this admission. last episode 2 weeks back. resumed prograf 1mg BID. continue prednisone. hold cellcept for now     Patient has a diagnosis of pneumonia. The cause of the pneumonia is suspected to be bacterial in etiology but organism is not known. The pneumonia is stable. The patient has the following signs/symptoms of pneumonia: cough. The patient does not have a current oxygen requirement and the patient does not have a home oxygen requirement. I have reviewed the pertinent imaging. The following cultures have been collected: Blood cultures and Sputum culture The culture results are listed below.     Current antimicrobial regimen consists of the antibiotics  listed below. Will monitor patient closely and continue current treatment plan unchanged.    -Given fevers/chills and recurrent pna, reasonable to continue below abx and deescalate when indicated  -f/u sputum, blood cx    Antibiotics (From admission, onward)      Start     Stop Route Frequency Ordered    04/30/25 0900  azithromycin (ZITHROMAX) 500 mg in 0.9% NaCl 250 mL IVPB (admixture device)         05/01/25 1259 IV Every 24 hours (non-standard times) 04/29/25 1016          Microbiology Results (last 7 days)       Procedure Component Value Units Date/Time    Blood culture x two cultures. Draw prior to antibiotics. [[id number]]  (Normal) Collected: 04/29/25 0758    Order Status: Completed Specimen: Blood from Peripheral, Forearm, Left Updated: 05/04/25 1801     Blood Culture No Growth After 5 Days    Blood culture x two cultures. Draw prior to antibiotics. [[id number]]  (Normal) Collected: 04/29/25 0758    Order Status: Completed Specimen: Blood from Peripheral, Antecubital, Left Updated: 05/04/25 1801     Blood Culture No Growth After 5 Days    Respiratory Infection Panel (PCR), Nasopharyngeal [[id number]] Collected: 05/02/25 1027    Order Status: Completed Specimen: Nasopharyngeal Swab Updated: 05/02/25 1545     Respiratory Infection Panel Source Nasopharyngeal Swab     Adenovirus Not Detected     Coronavirus 229E, Common Cold Virus Not Detected     Coronavirus HKU1, Common Cold Virus Not Detected     Coronavirus NL63, Common Cold Virus Not Detected     Coronavirus OC43, Common Cold Virus Not Detected     SARS-CoV2 (COVID-19) Qualitative PCR Not Detected     Human Metapneumovirus Not Detected     Human Rhinovirus/Enterovirus Not Detected     Influenza A Not Detected     Influenza B Not Detected     Parainfluenza Virus 1 Not Detected     Parainfluenza Virus 2 Not Detected     Parainfluenza Virus 3 Not Detected     Parainfluenza Virus 4 Not Detected     Respiratory Syncytial Virus Not Detected     Bordetella  Parapertussis ([id number]) Not Detected     Bordetella pertussis (ptxP) Not Detected     Chlamydia pneumoniae Not Detected     Mycoplasma pneumoniae Not Detected          5/1  BC x2 4/29 NGTD. vancomycins discontinued.  respiratory cultures pending.  CT chest ordered -L GGO concerning for pneumonia possible hiatal hernia and possible anterior vertebral osteophyte near mid esophagus.  SLP consulted  and consider esophogram to assess for hiatal hernia and recurrent aspiration as a cause of repeat pneumonia. continue cefepime. completed azithromycin. denies hemoptysis this admission. last episode 2 weeks back. resumed prograf 1mg BID. continue prednisone. hold cellcept for now     Patient has a diagnosis of pneumonia. The cause of the pneumonia is suspected to be bacterial in etiology but organism is not known. The pneumonia is stable. The patient has the following signs/symptoms of pneumonia: cough. The patient does not have a current oxygen requirement and the patient does not have a home oxygen requirement. I have reviewed the pertinent imaging. The following cultures have been collected: Blood cultures and Sputum culture The culture results are listed below.     Current antimicrobial regimen consists of the antibiotics listed below. Will monitor patient closely and continue current treatment plan unchanged.    -Given fevers/chills and recurrent pna, reasonable to continue below abx and deescalate when indicated  -f/u sputum, blood cx    Antibiotics (From admission, onward)      Start     Stop Route Frequency Ordered    04/30/25 0900  azithromycin (ZITHROMAX) 500 mg in 0.9% NaCl 250 mL IVPB (admixture device)         05/01/25 1259 IV Every 24 hours (non-standard times) 04/29/25 1016          Antibiotics (From admission, onward)      Start     Stop Route Frequency Ordered    04/30/25 0900  azithromycin (ZITHROMAX) 500 mg in 0.9% NaCl 250 mL IVPB (admixture device)         05/01/25 1259 IV Every 24 hours (non-standard  times) 04/29/25 1016            Patient has a diagnosis of pneumonia. The cause of the pneumonia is suspected to be bacterial in etiology but organism is not known. The pneumonia is stable. The patient has the following signs/symptoms of pneumonia: cough. The patient does not have a current oxygen requirement and the patient does not have a home oxygen requirement. I have reviewed the pertinent imaging. The following cultures have been collected: Blood cultures and Sputum culture The culture results are listed below.     Current antimicrobial regimen consists of the antibiotics listed below. Will monitor patient closely and continue current treatment plan unchanged.    -Given fevers/chills and recurrent pna, reasonable to continue below abx and deescalate when indicated  -f/u sputum, blood cx    Antibiotics (From admission, onward)      Start     Stop Route Frequency Ordered    04/30/25 0900  azithromycin (ZITHROMAX) 500 mg in 0.9% NaCl 250 mL IVPB (admixture device)         05/01/25 1259 IV Every 24 hours (non-standard times) 04/29/25 1016          Microbiology Results (last 7 days)       Procedure Component Value Units Date/Time    Blood culture x two cultures. Draw prior to antibiotics. [[id number]]  (Normal) Collected: 04/29/25 0758    Order Status: Completed Specimen: Blood from Peripheral, Forearm, Left Updated: 05/04/25 1801     Blood Culture No Growth After 5 Days    Blood culture x two cultures. Draw prior to antibiotics. [[id number]]  (Normal) Collected: 04/29/25 0758    Order Status: Completed Specimen: Blood from Peripheral, Antecubital, Left Updated: 05/04/25 1801     Blood Culture No Growth After 5 Days    Respiratory Infection Panel (PCR), Nasopharyngeal [[id number]] Collected: 05/02/25 1027    Order Status: Completed Specimen: Nasopharyngeal Swab Updated: 05/02/25 1545     Respiratory Infection Panel Source Nasopharyngeal Swab     Adenovirus Not Detected     Coronavirus 229E, Common Cold Virus Not  Detected     Coronavirus HKU1, Common Cold Virus Not Detected     Coronavirus NL63, Common Cold Virus Not Detected     Coronavirus OC43, Common Cold Virus Not Detected     SARS-CoV2 (COVID-19) Qualitative PCR Not Detected     Human Metapneumovirus Not Detected     Human Rhinovirus/Enterovirus Not Detected     Influenza A Not Detected     Influenza B Not Detected     Parainfluenza Virus 1 Not Detected     Parainfluenza Virus 2 Not Detected     Parainfluenza Virus 3 Not Detected     Parainfluenza Virus 4 Not Detected     Respiratory Syncytial Virus Not Detected     Bordetella Parapertussis (EX7755) Not Detected     Bordetella pertussis (ptxP) Not Detected     Chlamydia pneumoniae Not Detected     Mycoplasma pneumoniae Not Detected          5/1  BC x2 4/29 NGTD. vancomycins discontinued.  respiratory cultures pending.  CT chest ordered -RML GGO concerning for pneumonia possible hiatal hernia and possible anterior vertebral osteophyte near mid esophagus.  SLP consulted  and consider esophogram to assess for hiatal hernia and recurrent aspiration as a cause of repeat pneumonia. continue cefepime. completed azithromycin. denies hemoptysis this admission. last episode 2 weeks back. resumed prograf 1mg BID. continue prednisone. hold cellcept for now     Patient has a diagnosis of pneumonia. The cause of the pneumonia is suspected to be bacterial in etiology but organism is not known. The pneumonia is stable. The patient has the following signs/symptoms of pneumonia: cough. The patient does not have a current oxygen requirement and the patient does not have a home oxygen requirement. I have reviewed the pertinent imaging. The following cultures have been collected: Blood cultures and Sputum culture The culture results are listed below.     Current antimicrobial regimen consists of the antibiotics listed below. Will monitor patient closely and continue current treatment plan unchanged.    -Given fevers/chills and recurrent  pna, reasonable to continue below abx and deescalate when indicated  -f/u sputum, blood cx    Antibiotics (From admission, onward)      Start     Stop Route Frequency Ordered    04/30/25 0900  azithromycin (ZITHROMAX) 500 mg in 0.9% NaCl 250 mL IVPB (admixture device)         05/01/25 1259 IV Every 24 hours (non-standard times) 04/29/25 1016          Antibiotics (From admission, onward)      Start     Stop Route Frequency Ordered    04/30/25 0900  azithromycin (ZITHROMAX) 500 mg in 0.9% NaCl 250 mL IVPB (admixture device)         05/01/25 1259 IV Every 24 hours (non-standard times) 04/29/25 1016          5/1  PMH of DM, renal transplant 2016, CHFpEF, AF/AFL (PVI/PWI/CTI 6/2024), CVA, recurrent PNA who presents with fevers, chills.  States that symptoms are similar to last month when he was admitted for pneumonia. No other pna symptoms despite CXR changes.BC x2 4/29 NGTD. vancomycins discontinued.  respiratory cultures pending.  CT chest ordered -L GGO concerning for pneumonia possible hiatal hernia and possible anterior vertebral osteophyte near mid esophagus.  SLP consulted  and  barium esophogram to assess for hiatal hernia (second episode of pneumonia since March 2025) and recurrent aspiration as a cause of repeat pneumonia. continue cefepime. completed azithromycin. denies hemoptysis this admission. last episode 2 weeks back. resumed prograf 1mg BID. continue prednisone. hold cellcept for now     Patient has a diagnosis of pneumonia. The cause of the pneumonia is suspected to be bacterial in etiology but organism is not known. The pneumonia is stable. The patient has the following signs/symptoms of pneumonia: cough. The patient does not have a current oxygen requirement and the patient does not have a home oxygen requirement. I have reviewed the pertinent imaging. The following cultures have been collected: Blood cultures and Sputum culture The culture results are listed below.     Current antimicrobial  regimen consists of the antibiotics listed below. Will monitor patient closely and continue current treatment plan unchanged.    -Given fevers/chills and recurrent pna, reasonable to continue below abx and deescalate when indicated  -f/u sputum, blood cx    Antibiotics (From admission, onward)      Start     Stop Route Frequency Ordered    04/30/25 0900  azithromycin (ZITHROMAX) 500 mg in 0.9% NaCl 250 mL IVPB (admixture device)         05/01/25 1259 IV Every 24 hours (non-standard times) 04/29/25 1016          Microbiology Results (last 7 days)       Procedure Component Value Units Date/Time    Blood culture x two cultures. Draw prior to antibiotics. [[id number]]  (Normal) Collected: 04/29/25 0758    Order Status: Completed Specimen: Blood from Peripheral, Forearm, Left Updated: 05/04/25 1801     Blood Culture No Growth After 5 Days    Blood culture x two cultures. Draw prior to antibiotics. [[id number]]  (Normal) Collected: 04/29/25 0758    Order Status: Completed Specimen: Blood from Peripheral, Antecubital, Left Updated: 05/04/25 1801     Blood Culture No Growth After 5 Days    Respiratory Infection Panel (PCR), Nasopharyngeal [[id number]] Collected: 05/02/25 1027    Order Status: Completed Specimen: Nasopharyngeal Swab Updated: 05/02/25 1545     Respiratory Infection Panel Source Nasopharyngeal Swab     Adenovirus Not Detected     Coronavirus 229E, Common Cold Virus Not Detected     Coronavirus HKU1, Common Cold Virus Not Detected     Coronavirus NL63, Common Cold Virus Not Detected     Coronavirus OC43, Common Cold Virus Not Detected     SARS-CoV2 (COVID-19) Qualitative PCR Not Detected     Human Metapneumovirus Not Detected     Human Rhinovirus/Enterovirus Not Detected     Influenza A Not Detected     Influenza B Not Detected     Parainfluenza Virus 1 Not Detected     Parainfluenza Virus 2 Not Detected     Parainfluenza Virus 3 Not Detected     Parainfluenza Virus 4 Not Detected     Respiratory Syncytial  Virus Not Detected     Bordetella Parapertussis ([id number]) Not Detected     Bordetella pertussis (ptxP) Not Detected     Chlamydia pneumoniae Not Detected     Mycoplasma pneumoniae Not Detected          5/1  BC x2 4/29 NGTD. vancomycins discontinued.  respiratory cultures pending.  CT chest ordered -L GGO concerning for pneumonia possible hiatal hernia and possible anterior vertebral osteophyte near mid esophagus.  SLP consulted  and consider esophogram to assess for hiatal hernia and recurrent aspiration as a cause of repeat pneumonia. continue cefepime. completed azithromycin. denies hemoptysis this admission. last episode 2 weeks back. resumed prograf 1mg BID. continue prednisone. hold cellcept for now     Patient has a diagnosis of pneumonia. The cause of the pneumonia is suspected to be bacterial in etiology but organism is not known. The pneumonia is stable. The patient has the following signs/symptoms of pneumonia: cough. The patient does not have a current oxygen requirement and the patient does not have a home oxygen requirement. I have reviewed the pertinent imaging. The following cultures have been collected: Blood cultures and Sputum culture The culture results are listed below.     Current antimicrobial regimen consists of the antibiotics listed below. Will monitor patient closely and continue current treatment plan unchanged.    -Given fevers/chills and recurrent pna, reasonable to continue below abx and deescalate when indicated  -f/u sputum, blood cx    Antibiotics (From admission, onward)      Start     Stop Route Frequency Ordered    04/30/25 0900  azithromycin (ZITHROMAX) 500 mg in 0.9% NaCl 250 mL IVPB (admixture device)         05/01/25 1259 IV Every 24 hours (non-standard times) 04/29/25 1016          Antibiotics (From admission, onward)      Start     Stop Route Frequency Ordered    04/30/25 0900  azithromycin (ZITHROMAX) 500 mg in 0.9% NaCl 250 mL IVPB (admixture device)         05/01/25 1259  IV Every 24 hours (non-standard times) 04/29/25 1016

## 2025-05-04 NOTE — ASSESSMENT & PLAN NOTE
Patient has persistent (7 days or more) atrial fibrillation. Patient is currently in atrial fibrillation. [id number] Score: 3. The patients heart rate in the last 24 hours is as follows:  Pulse  Min: 78  Max: 100     Antiarrhythmics       Anticoagulants  rivaroxaban tablet 15 mg, With dinner, Oral    Plan  - Replete lytes with a goal of K>4, Mg >2  - Patient is anticoagulated, see medications listed above.  - Patient's afib is currently controlled

## 2025-05-04 NOTE — ASSESSMENT & PLAN NOTE
Patient's FSGs are controlled on current medication regimen.  Last A1c reviewed-   Lab Results   Component Value Date    HGBA1C 8.3 (H) 03/27/2025     Most recent fingerstick glucose reviewed-   Recent Labs   Lab 05/06/25  1149 05/06/25  1623 05/06/25  2148   POCTGLUCOSE 172* 135* 115*     Current correctional scale  Medium  Maintain anti-hyperglycemic dose as follows-   Antihyperglycemics (From admission, onward)      Start     Stop Route Frequency Ordered    05/06/25 1415  insulin aspart U-100 insulin pump from home        Question Answer Comment   Target number 110    Basal Rate #1 0.6    Basal rate #1 time 0753-7162        -- SubQ Continuous 05/06/25 1303    05/06/25 1402  insulin aspart U-100 insulin pump from home 0-10 Units        Question Answer Comment   Target number 110    Carbohydrate coverage #1 7.5    Carbohydrate coverage #1 time 9380-1523    Sensitivity #1 40    Sensitivity #1 time 3072-6706        -- SubQ As needed (PRN) 05/06/25 1303    04/29/25 1115  empagliflozin (Jardiance) tablet 10 mg        Question Answer Comment   Does this patient have a diagnosis of heart failure? Yes    Does this patient have type 1 diabetes or diabetic ketoacidosis? No    Does this patient have symptomatic hypotension? No    Is the patient NPO or pending major surgery in next 3 days or less? No        -- Oral Daily 04/29/25 1016          Hold Oral hypoglycemics while patient is in the hospital.    Blood glucose acceptable

## 2025-05-04 NOTE — ASSESSMENT & PLAN NOTE
69M with PMH of kidney transplant 2016, presents with fever, chills, cough, has LINDSAY multilobar consolidations and GGO and mod R effusion on CT chest. He has been afebrile, on room air, no leukocytosis. 4/29 BCX NG. RIP negative. RCX and MRSA nares sent, urine legionella in process. Currently on cefepime.     Recommendations  Continue cefepime for 7 day course - end date 5/5/25  Follow up urine legionella

## 2025-05-04 NOTE — PROGRESS NOTES
Kidney Transplant Medicine Service Chart Check Note:    Medications:   acetaminophen  1,000 mg Oral Q12H    atorvastatin  80 mg Oral Daily    ceFEPime IV (PEDS and ADULTS)  2 g Intravenous Q12H    doxazosin  8 mg Oral Daily    empagliflozin  10 mg Oral Daily    gabapentin  300 mg Oral Daily    gabapentin  300 mg Oral After lunch    gabapentin  300 mg Oral QHS    hydrALAZINE  100 mg Oral Q8H    insulin glargine U-100  15 Units Subcutaneous QHS    Lactobacillus rhamnosus GG  1 capsule Oral Daily    polyethylene glycol  17 g Oral Daily    predniSONE  5 mg Oral Daily    rivaroxaban  15 mg Oral Daily with dinner    tacrolimus  1 mg Oral BID    torsemide  40 mg Oral Daily    valsartan  320 mg Oral Daily       Vitals:  Temp:  [97.9 °F (36.6 °C)-98.3 °F (36.8 °C)] 97.9 °F (36.6 °C)  Pulse:  [] 84  Resp:  [18-19] 18  SpO2:  [97 %-99 %] 99 %  BP: (134-205)/(77-91) 134/80    I/Os:  I/O last 3 completed shifts:  In: 960 [P.O.:960]  Out: -     Labs reviewed      Recommendations:  69 yr old AAM with ESRD secondary to DM s/p a  donor kidney transplant (bcr of 2-2.2) on 2016, HF (EF of 50-55%, grade III diastolic dysfunction), prior CVA's who presents with fever, chills cough found to have bilateral multilobar consolidation on CT. Resp panel negative    - remains in mild stable PHUONG. Cont to monitor   - acceptable FK level. 9hr trough. Monitor for now   - discussed with primary regarding potential BP medication options however given concern for uncontrolled HTN, can keep valsartan on for now.    - cont to hold Cellcept    Eulalia Giron DO  Renal Transplant Attending

## 2025-05-04 NOTE — PROGRESS NOTES
[facility]  Progress Note    Patient Name: Ravi Zamorano  MRN: [id number]  Patient Class: IP- Inpatient   Admission Date: 4/29/2025  Length of Stay: 5 days  Attending Physician: Cliff Guaman MD  Primary Care Provider: Mima Mack MD        Subjective     Principal Problem:Community acquired bacterial pneumonia        HPI:  This is a 69-year-old male with a history of DM, renal transplant 2016, CHFpEF, AF/AFL (PVI/PWI/CTI 6/2024), CVA, recurrent PNA who presents with fevers, chills.  States that symptoms are similar to last month when he was admitted for pneumonia.  Was in his usual state of health until last night when he started experiencing intermittent fevers, chills.  Wife states she also noticed that he has had increased intermittent cough over the past day or so.  Patient did not notice this.  Denies any production.  Also denies any shortness for breath, wheezing, chest pain, abdominal pain, diarrhea, nausea, vomiting, dysuria.  Compliant with all medications.  Trace lower extremity swelling is chronic.    ED course:  On room air.  Lab work significant for Cr 2.7, same as previous. CXR showed development of RLL consolidation.  Received IV vancomycin, azithromycin, Zosyn.  Septic workup obtained.    Overview/Hospital Course:  Patient admitted due to fever, fatigue and chills at home. He has not had any cough or sob at home. He was recently treated for PNA at the end of march and was feeling a lot better since then, until he had a sudden onset of symptoms. He did describe some productive cough for several morning and the mucus he coughed op had rust colored blood as well as bright red blood sometimes. Theses episodes only happened shortly after wakening in the morning and didn't persist during the day, no associated sob or other symptoms during those times. Denies nasal drainage, congestion, or nose bleeds.       Started on broad spectrum and feeling a lot better.  Still on RA and no cough, wheezes or sob     CT chest ordered to get a better look at the consolidations and possible hemoptysis, will considered pulmonology and/or ID consulted to help direct care    5/1  PMH of DM, renal transplant 2016, CHFpEF, AF/AFL (PVI/PWI/CTI 6/2024), CVA, recurrent PNA who presents with fevers, chills.  States that symptoms are similar to last month when he was admitted for pneumonia. No other pna symptoms despite CXR changes.BC x2 4/29 NGTD. vancomycins discontinued.  respiratory cultures pending.  CT chest ordered -L GGO concerning for pneumonia possible hiatal hernia and possible anterior vertebral osteophyte near mid esophagus.  SLP consulted. MBSS and barium esophogram to assess for hiatal hernia (second episode of pneumonia since March 2025) and recurrent aspiration as a cause of repeat pneumonia. continue cefepime. completed azithromycin. denies hemoptysis this admission. last episode 2 weeks back. resumed prograf 1mg BID. continue prednisone. hold cellcept for now   5/2 s/p FEES. SLP recs regular diet/thin liquids CT chest -Bilateral multilobar consolidations and ground-glass opacities associated with bronchial thickening suggestive of infectious/inflammatory process. Right moderate and left small pleural effusions.  ID consulted for recurrent pneumonia/ immunosuppressed/ hemoptysis.  SBP 160s-180s. coreg previously due to bradycardia during hospitalization 2/202. discontinued Spironolactone 25 mg daily -  3/2024 due to getting LH and dizzy . procardia listed as allergy. Doxazosin increased to 8mg daiy. . HCTZ discontinued. started on torsemide 40mg daily. ID eval  -  resp infection panel and urine legionella  MRSA nares, resp culture  5/3 SBP 200s overnight. started on IV hydralzine. received IV hydralazine 15mg x 2 doses. BP  132/67 . K replaced. RIP negative. EKG -Atrial fibrillation with premature ventricular or aberrantly conducted complexe. Low voltage QRS. KTM f/u - Cr  elevated from his baseline - suspicion for failing allograft and approaching need for dialysis .  hold valsartan for now. monitor BP  5/4 K replaced. SBP 200s overnight. received hydralazine 15mg x 1 overnight. discussed with KTM. Cr stable. resume valsartan. Clonidine 0.1 mg PRN for SBP > 170mm HG. continue  cefepime for 7 day course (end date 5/5/25)      Review of Systems:   Pain scale:   Constitutional:  fever,  chills, headache, vision loss, hearing loss, weight loss, Generalized weakness, falls, loss of smell, loss of taste, poor appetite,  sore throat  Respiratory: cough, shortness of breath.   Cardiovascular: chest pain, dizziness, palpitations, orthopnea, swelling of feet, syncope  Gastrointestinal: nausea, vomiting, abdominal pain, diarrhea, black stool,  blood in stool, change in bowel habits, constipation  Genitourinary: hematuria, dysuria, urgency, frequency  Integument/Breast: rash,  pruritis  Hematologic/Lymphatic: easy bruising, lymphadenopathy  Musculoskeletal: arthralgias , myalgias, back pain, neck pain, knee pain  Neurological: confusion, seizures, tremors, slurred speech  Behavioral/Psych:  depression, anxiety, auditory or visual hallucinations     OBJECTIVE:     Physical Exam:  Body mass index is 26.39 kg/m².    Constitutional: Appears well-developed and well-nourished.   Head: Normocephalic and atraumatic.   Neck: Normal range of motion. Neck supple.   Cardiovascular: Normal heart rate.  Regular heart rhythm.  Pulmonary/Chest: Effort normal.   Abdominal: No distension.  No tenderness  Musculoskeletal: Normal range of motion. No edema.   Neurological: Alert and oriented to person, place, and time.   Skin: Skin is warm and dry.   Psychiatric: Normal mood and affect. Behavior is normal.                  Vital Signs  Temp: 98.1 °F (36.7 °C) (05/04/25 1148)  Pulse: 90 (05/04/25 1148)  Resp: 18 (05/04/25 1148)  BP: (!) 172/88 (05/04/25 1148)  SpO2: 99 % (05/04/25 1148)     24 Hour VS Range    Temp:   "[97.7 °F (36.5 °C)-98.3 °F (36.8 °C)]   Pulse:  []   Resp:  [18-19]   BP: (139-205)/(71-91)   SpO2:  [97 %-99 %]     Intake/Output Summary (Last 24 hours) at 5/4/2025 1232  Last data filed at 5/3/2025 1300  Gross per 24 hour   Intake 240 ml   Output --   Net 240 ml         I/O This Shift:  No intake/output data recorded.    Wt Readings from Last 3 Encounters:   04/29/25 90.7 kg (200 lb)   04/22/25 92.8 kg (204 lb 9.4 oz)   04/16/25 91.9 kg (202 lb 9.6 oz)       I have personally reviewed the vitals and recorded Intake/Output     Laboratory/Diagnostic Data:    CBC/Anemia Labs: Coags:    Recent Labs   Lab 05/02/25  0215 05/03/25  0237 05/04/25  0233   WBC 5.71 4.08 4.35   HGB 8.5* 8.4* 8.6*   HCT 29.0* 29.1* 29.8*   * 114* 115*   MCV 78* 78* 77*   RDW 19.9* 19.7* 19.7*    No results for input(s): "PT", "INR", "APTT" in the last 168 hours.     Chemistries: ABG:   Recent Labs   Lab 04/29/25  0758 04/30/25  0619 05/02/25  0215 05/03/25  0237 05/03/25  1627 05/04/25  0233   *   < > 135* 135* 137 137   K 3.3*   < > 3.5 3.2* 4.1 3.4*   CL 99   < > 104 106 106 106   CO2 25   < > 21* 20* 23 20*   BUN 49*   < > 48* 45* 43* 43*   CREATININE 2.7*   < > 2.4* 2.6* 2.8* 2.7*   CALCIUM 8.2*   < > 8.1* 8.0* 8.5* 8.3*   PROT 5.9*  --   --   --   --   --    BILITOT 0.5  --   --   --   --   --    ALKPHOS 52  --   --   --   --   --    ALT <5*  --   --   --   --   --    AST 14  --   --   --   --   --    MG 2.2   < > 2.3 2.1  --  1.9   PHOS  --    < > 2.8 3.4 3.6 3.9    < > = values in this interval not displayed.    Recent Labs   Lab 04/29/25  0804   PH 7.439   PCO2 40.5   PO2 47.3   HCO3 26.8        POCT Glucose: HbA1c:    Recent Labs   Lab 05/02/25  2108 05/03/25  0802 05/03/25  1123 05/03/25  1638 05/03/25 2059 05/04/25  0817   POCTGLUCOSE 147* 100 156* 148* 115* 160*    Hemoglobin A1C   Date Value Ref Range Status   01/27/2025 8.6 (H) 4.0 - 5.6 % Final     Comment:     ADA Screening Guidelines:  5.7-6.4%  " "Consistent with prediabetes  >or=6.5%  Consistent with diabetes    High levels of fetal hemoglobin interfere with the HbA1C  assay. Heterozygous hemoglobin variants (HbS, HgC, etc)do  not significantly interfere with this assay.   However, presence of multiple variants may affect accuracy.     12/07/2024 8.1 (H) 4.0 - 5.6 % Final     Comment:     ADA Screening Guidelines:  5.7-6.4%  Consistent with prediabetes  >or=6.5%  Consistent with diabetes    High levels of fetal hemoglobin interfere with the HbA1C  assay. Heterozygous hemoglobin variants (HbS, HgC, etc)do  not significantly interfere with this assay.   However, presence of multiple variants may affect accuracy.     11/19/2024 7.4 (H) 4.0 - 5.6 % Final     Comment:     ADA Screening Guidelines:  5.7-6.4%  Consistent with prediabetes  >or=6.5%  Consistent with diabetes    High levels of fetal hemoglobin interfere with the HbA1C  assay. Heterozygous hemoglobin variants (HbS, HgC, etc)do  not significantly interfere with this assay.   However, presence of multiple variants may affect accuracy.       Hemoglobin A1c   Date Value Ref Range Status   03/27/2025 8.3 (H) 4.0 - 5.6 % Final     Comment:     ADA Screening Guidelines:  5.7-6.4%  Consistent with prediabetes  >=6.5%  Consistent with diabetes    High levels of fetal hemoglobin interfere with the HbA1C  assay. Heterozygous hemoglobin variants (HbS, HgC, etc)do  not significantly interfere with this assay.   However, presence of multiple variants may affect accuracy.        Cardiac Enzymes: Ejection Fractions:    No results for input(s): "CPK", "CPKMB", "MB", "TROPONINI" in the last 72 hours. EF   Date Value Ref Range Status   02/21/2025 53 % Final   06/16/2024 58 % Final          No results for input(s): "COLORU", "APPEARANCEUA", "PHUR", "SPECGRAV", "PROTEINUA", "GLUCUA", "KETONESU", "BILIRUBINUA", "OCCULTUA", "NITRITE", "UROBILINOGEN", "LEUKOCYTESUR", "RBCUA", "WBCUA", "BACTERIA", "SQUAMEPITHEL", " ""HYALINECASTS" in the last 48 hours.    Invalid input(s): "WRIGHTSUR"    Lactate (Lactic Acid) (mmol/L)   Date Value   07/11/2022 1.0   07/10/2022 3.0 (H)   07/10/2022 3.1 (H)   03/15/2019 1.5   03/15/2019 3.3 (H)     BNP (pg/mL)   Date Value   03/16/2025 1,766 (H)   02/20/2025 1,055 (H)   06/25/2024 992 (H)   06/15/2024 3,203 (H)   06/04/2024 3,313 (H)     CRP (mg/L)   Date Value   04/02/2024 6.9   07/10/2022 40.5 (H)     Sed Rate   Date Value   04/02/2024 62 mm/Hr (H)   03/09/2007 2 mm/hr   05/10/2006 6 mm/hr     D-Dimer (mg/L FEU)   Date Value   07/10/2022 0.45     Ferritin (ng/mL)   Date Value   04/01/2024 816 (H)   07/10/2022 148   02/07/2022 73   05/26/2021 29   01/30/2017 251   11/08/2016 389 (H)     LD (U/L)   Date Value   04/01/2024 206   07/10/2022 221   11/10/2021 220     Troponin I (ng/mL)   Date Value   06/15/2024 0.812 (H)   06/15/2024 0.948 (H)   06/15/2024 0.934 (H)   05/02/2024 0.036 (H)   05/02/2024 0.049 (H)   07/11/2022 0.090 (H)   07/10/2022 0.265 (H)   03/16/2019 0.037 (H)     CPK (U/L)   Date Value   07/10/2022 83   03/16/2019 56   05/09/2008 86   05/15/2006 87   05/10/2006 44   05/10/2006 29   05/09/2006 40     Results for orders placed or performed in visit on 07/11/24   Vitamin D    Collection Time: 07/11/24  4:44 PM   Result Value Ref Range    Vit D, 25-Hydroxy 17 (L) 30 - 96 ng/mL     *Note: Due to a large number of results and/or encounters for the requested time period, some results have not been displayed. A complete set of results can be found in Results Review.     SARS-CoV2 (COVID-19) Qualitative PCR (no units)   Date Value   05/02/2025 Not Detected   03/16/2025 Not Detected   12/18/2020 Not Detected     SARS-CoV-2 RNA, Amplification, Qual (no units)   Date Value   03/16/2025 Negative   12/07/2024 Negative     POC Rapid COVID (no units)   Date Value   07/08/2022 Positive (A)   08/02/2021 Positive (A)       Microbiology labs for the last week  Microbiology Results (last 7 days)       " Procedure Component Value Units Date/Time    Blood culture x two cultures. Draw prior to antibiotics. [[id number]]  (Normal) Collected: 04/29/25 0758    Order Status: Completed Specimen: Blood from Peripheral, Forearm, Left Updated: 05/03/25 1801     Blood Culture No Growth After 96 hours    Blood culture x two cultures. Draw prior to antibiotics. [[id number]]  (Normal) Collected: 04/29/25 0758    Order Status: Completed Specimen: Blood from Peripheral, Antecubital, Left Updated: 05/03/25 1801     Blood Culture No Growth After 96 hours    Respiratory Infection Panel (PCR), Nasopharyngeal [[id number]] Collected: 05/02/25 1027    Order Status: Completed Specimen: Nasopharyngeal Swab Updated: 05/02/25 1545     Respiratory Infection Panel Source Nasopharyngeal Swab     Adenovirus Not Detected     Coronavirus 229E, Common Cold Virus Not Detected     Coronavirus HKU1, Common Cold Virus Not Detected     Coronavirus NL63, Common Cold Virus Not Detected     Coronavirus OC43, Common Cold Virus Not Detected     SARS-CoV2 (COVID-19) Qualitative PCR Not Detected     Human Metapneumovirus Not Detected     Human Rhinovirus/Enterovirus Not Detected     Influenza A Not Detected     Influenza B Not Detected     Parainfluenza Virus 1 Not Detected     Parainfluenza Virus 2 Not Detected     Parainfluenza Virus 3 Not Detected     Parainfluenza Virus 4 Not Detected     Respiratory Syncytial Virus Not Detected     Bordetella Parapertussis (IP8592) Not Detected     Bordetella pertussis (ptxP) Not Detected     Chlamydia pneumoniae Not Detected     Mycoplasma pneumoniae Not Detected    MRSA Screen by PCR [[id number]]     Order Status: Sent Specimen: Nasal Swab     Culture, Respiratory with Gram Stain [[id number]]     Order Status: Sent Specimen: Respiratory     Culture, Respiratory with Gram Stain [[id number]]     Order Status: Sent Specimen: Respiratory     Influenza A & B by Molecular [[id number]]  (Normal) Collected: 04/29/25 0749    Order  Status: Completed Specimen: Nasal Swab Updated: 04/29/25 0836     INFLUENZA A MOLECULAR Negative     INFLUENZA B MOLECULAR  Negative            Reviewed and noted in plan where applicable- Please see chart for full lab data.    Lines/Drains:       Peripheral IV - Single Lumen 04/30/25 1112 20 G Anterior;Distal;Right Upper Arm (Active)   Site Assessment Clean;Dry;Intact;No redness;No swelling;No drainage 05/01/25 0800   Line Status Saline locked;Flushed 05/01/25 0800   Dressing Status Intact;Dry;Clean 05/01/25 0800   Dressing Intervention Integrity maintained 05/01/25 0800   Number of days: 1       Imaging  ECG Results              EKG 12-lead (Final result)        Collection Time Result Time QRS Duration OHS QTC Calculation    04/29/25 07:35:29 04/29/25 07:45:23 112 460                     Final result by Interface, Lab In [facility] (04/29/25 07:45:29)                   Narrative:    Test Reason : Z13.6,    Vent. Rate :  78 BPM     Atrial Rate : 300 BPM     P-R Int :    ms          QRS Dur : 112 ms      QT Int : 404 ms       P-R-T Axes : -89 105   8 degrees    QTcB Int : 460 ms    Atrial flutter  Rightward axis  Nonspecific ST and T wave abnormality  Low septal forces ; Abnormal R wave progression in the precordial leads  -Possible anterior infarct  Possible lateral infarct vs lead reversal  Prolonged QT  Abnormal ECG  When compared with ECG of 16-Apr-2025 13:38,  The axis Shifted right  ST no longer elevated in Anterior leads  Confirmed by eTjas Carter ([id number]) on 4/29/2025 7:45:21 AM    Referred By: AAAREFERRAL SELF           Confirmed By: Tejas Carter                                    Results for orders placed during the hospital encounter of 02/20/25    Echo    Interpretation Summary    Left Ventricle: There is mild concentric hypertrophy. There is low normal systolic function with a visually estimated ejection fraction of 50 - 55%. Grade III diastolic dysfunction.    Left Ventricle: The left ventricle is at the upper  limits of normal in size. Mildly increased wall thickness. There is mild concentric hypertrophy. Regional wall motion abnormalities present. See diagram for wall motion findings. There is low normal systolic function with a visually estimated ejection fraction of 50 - 55%. Ejection fraction is approximately 53%. Grade III diastolic dysfunction.    Right Ventricle: Systolic function is borderline low despite the low TAPSE.    Left Atrium: Left atrium is moderately dilated.    Aortic Valve: The aortic valve is a trileaflet valve. There is mild aortic valve sclerosis. There is moderate annular calcification present. There is mild aortic regurgitation with a centrally directed jet.    Mitral Valve: There is mild regurgitation with a centrally directed jet.    Pericardium: There is a trivial posterior effusion and under the RA.    Tricuspid Valve: There is mild regurgitation.    Pulmonary Artery: The estimated pulmonary artery systolic pressure is 30 mmHg.      CT Chest Without Contrast  Narrative: EXAMINATION:  CT CHEST WITHOUT CONTRAST    CLINICAL HISTORY:  Pneumonia, unresolved;    TECHNIQUE:  CT chest without contrast acquiring images from above the pulmonary apices to the upper abdomen.  Data was reconstructed for multiplanar images in axial, sagittal and coronal planes and for maximal intensity projection images in the the axial plane.    COMPARISON:  None.    FINDINGS:  Base of Neck/thoracic soft tissues: No significant abnormality.    Aorta: Left-sided aortic arch. No aneurysm. Three vessel aortic arch. Moderate calcified atherosclerosis of the thoracic aorta.    Heart/pericardium: Dilated left ventricle moderate multivessel calcified atherosclerosis of the coronary arteries.  No pericardial effusion.    Airways/Lungs/Pleura:  Central airways are patent. Bilateral multifocal ground glass opacities and small irregular consolidations associated with bronchial wall thickening.  Right moderate and left small pleural  effusions.  Mild apical paraseptal emphysema.  No pneumothorax.    Pulmonary vasculature: Unremarkable.    Marlen/Mediastinum: No pathologic christiano enlargement.    Esophagus: Unremarkable.    Upper Abdomen: Moderate abdominal calcified atherosclerosis involving the celiac trunk    Bones: Anterior large bridging osteophytes between T8-T9. No suspicious osseous lesion.  Impression: Bilateral multilobar consolidations and ground-glass opacities associated with bronchial thickening suggestive of infectious/inflammatory process.    Right moderate and left small pleural effusions.    Electronically signed by: Matthew Moore  Date:    05/01/2025  Time:    16:27      Labs, Imaging, EKG and Diagnostic results from 5/4/2025 were reviewed.    Medications:  Medication list was reviewed and changes noted under Assessment/Plan.  Medications Ordered Prior to Encounter[1]  Scheduled Medications:  Current Facility-Administered Medications   Medication Dose Route Frequency    acetaminophen  1,000 mg Oral Q12H    atorvastatin  80 mg Oral Daily    ceFEPime IV (PEDS and ADULTS)  2 g Intravenous Q12H    doxazosin  8 mg Oral Daily    empagliflozin  10 mg Oral Daily    gabapentin  300 mg Oral Daily    gabapentin  300 mg Oral After lunch    gabapentin  300 mg Oral QHS    hydrALAZINE  100 mg Oral Q8H    insulin glargine U-100  15 Units Subcutaneous QHS    Lactobacillus rhamnosus GG  1 capsule Oral Daily    polyethylene glycol  17 g Oral Daily    predniSONE  5 mg Oral Daily    rivaroxaban  15 mg Oral Daily with dinner    tacrolimus  1 mg Oral BID    torsemide  40 mg Oral Daily    valsartan  320 mg Oral Daily     PRN:   Current Facility-Administered Medications:     albuterol-ipratropium, 3 mL, Nebulization, Q6H PRN    cloNIDine, 0.1 mg, Oral, Once PRN    dextrose 50%, 12.5 g, Intravenous, PRN    dextrose 50%, 25 g, Intravenous, PRN    glucagon (human recombinant), 1 mg, Intramuscular, PRN    glucose, 16 g, Oral, PRN    glucose, 24 g,  Oral, PRN    hydrALAZINE, 15 mg, Intravenous, Q6H PRN    insulin aspart U-100, 0-10 Units, Subcutaneous, QID (AC + HS) PRN    meclizine, 25 mg, Oral, TID PRN    melatonin, 6 mg, Oral, Nightly PRN    naloxone, 0.02 mg, Intravenous, PRN    ondansetron, 8 mg, Oral, Q8H PRN    prochlorperazine, 5 mg, Intravenous, Q6H PRN    sodium chloride 0.9%, 10 mL, Intravenous, Q8H PRN  Infusions:   Estimated Creatinine Clearance: 30 mL/min (A) (based on SCr of 2.7 mg/dL (H)).             Assessment & Plan  Community acquired bacterial pneumonia  Hemoptysis    Patient has a diagnosis of pneumonia. The cause of the pneumonia is suspected to be bacterial in etiology but organism is not known. The pneumonia is stable. The patient has the following signs/symptoms of pneumonia: cough. The patient does not have a current oxygen requirement and the patient does not have a home oxygen requirement. I have reviewed the pertinent imaging. The following cultures have been collected: Blood cultures and Sputum culture The culture results are listed below.     Current antimicrobial regimen consists of the antibiotics listed below. Will monitor patient closely and continue current treatment plan unchanged.    -Given fevers/chills and recurrent pna, reasonable to continue below abx and deescalate when indicated  -f/u sputum, blood cx    Antibiotics (From admission, onward)      Start     Stop Route Frequency Ordered    05/02/25 1000  ceFEPIme injection 2 g         -- IV Every 12 hours (non-standard times) 05/02/25 0745    04/30/25 0900  azithromycin (ZITHROMAX) 500 mg in 0.9% NaCl 250 mL IVPB (admixture device)         05/01/25 1259 IV Every 24 hours (non-standard times) 04/29/25 1016          Microbiology Results (last 7 days)       Procedure Component Value Units Date/Time    Blood culture x two cultures. Draw prior to antibiotics. [[id number]]  (Normal) Collected: 04/29/25 3433    Order Status: Completed Specimen: Blood from Peripheral, Forearm,  Left Updated: 05/03/25 1801     Blood Culture No Growth After 96 hours    Blood culture x two cultures. Draw prior to antibiotics. [[id number]]  (Normal) Collected: 04/29/25 0758    Order Status: Completed Specimen: Blood from Peripheral, Antecubital, Left Updated: 05/03/25 1801     Blood Culture No Growth After 96 hours    Respiratory Infection Panel (PCR), Nasopharyngeal [[id number]] Collected: 05/02/25 1027    Order Status: Completed Specimen: Nasopharyngeal Swab Updated: 05/02/25 1545     Respiratory Infection Panel Source Nasopharyngeal Swab     Adenovirus Not Detected     Coronavirus 229E, Common Cold Virus Not Detected     Coronavirus HKU1, Common Cold Virus Not Detected     Coronavirus NL63, Common Cold Virus Not Detected     Coronavirus OC43, Common Cold Virus Not Detected     SARS-CoV2 (COVID-19) Qualitative PCR Not Detected     Human Metapneumovirus Not Detected     Human Rhinovirus/Enterovirus Not Detected     Influenza A Not Detected     Influenza B Not Detected     Parainfluenza Virus 1 Not Detected     Parainfluenza Virus 2 Not Detected     Parainfluenza Virus 3 Not Detected     Parainfluenza Virus 4 Not Detected     Respiratory Syncytial Virus Not Detected     Bordetella Parapertussis (QM1606) Not Detected     Bordetella pertussis (ptxP) Not Detected     Chlamydia pneumoniae Not Detected     Mycoplasma pneumoniae Not Detected    MRSA Screen by PCR [[id number]]     Order Status: Sent Specimen: Nasal Swab     Culture, Respiratory with Gram Stain [[id number]]     Order Status: Sent Specimen: Respiratory     Culture, Respiratory with Gram Stain [[id number]]     Order Status: Sent Specimen: Respiratory     Influenza A & B by Molecular [[id number]]  (Normal) Collected: 04/29/25 0749    Order Status: Completed Specimen: Nasal Swab Updated: 04/29/25 0836     INFLUENZA A MOLECULAR Negative     INFLUENZA B MOLECULAR  Negative          5/1  BC x2 4/29 NGTD. vancomycins discontinued.  respiratory cultures  pending.  CT chest ordered -L GGO concerning for pneumonia possible hiatal hernia and possible anterior vertebral osteophyte near mid esophagus.  SLP consulted  and consider esophogram to assess for hiatal hernia and recurrent aspiration as a cause of repeat pneumonia. continue cefepime. completed azithromycin. denies hemoptysis this admission. last episode 2 weeks back. resumed prograf 1mg BID. continue prednisone. hold cellcept for now     Patient has a diagnosis of pneumonia. The cause of the pneumonia is suspected to be bacterial in etiology but organism is not known. The pneumonia is stable. The patient has the following signs/symptoms of pneumonia: cough. The patient does not have a current oxygen requirement and the patient does not have a home oxygen requirement. I have reviewed the pertinent imaging. The following cultures have been collected: Blood cultures and Sputum culture The culture results are listed below.     Current antimicrobial regimen consists of the antibiotics listed below. Will monitor patient closely and continue current treatment plan unchanged.    -Given fevers/chills and recurrent pna, reasonable to continue below abx and deescalate when indicated  -f/u sputum, blood cx    Antibiotics (From admission, onward)      Start     Stop Route Frequency Ordered    05/02/25 1000  ceFEPIme injection 2 g         -- IV Every 12 hours (non-standard times) 05/02/25 0745    04/30/25 0900  azithromycin (ZITHROMAX) 500 mg in 0.9% NaCl 250 mL IVPB (admixture device)         05/01/25 1259 IV Every 24 hours (non-standard times) 04/29/25 1016          Antibiotics (From admission, onward)      Start     Stop Route Frequency Ordered    05/02/25 1000  ceFEPIme injection 2 g         -- IV Every 12 hours (non-standard times) 05/02/25 0745    04/30/25 0900  azithromycin (ZITHROMAX) 500 mg in 0.9% NaCl 250 mL IVPB (admixture device)         05/01/25 1259 IV Every 24 hours (non-standard times) 04/29/25 1016           5/1  PMH of DM, renal transplant 2016, CHFpEF, AF/AFL (PVI/PWI/CTI 6/2024), CVA, recurrent PNA who presents with fevers, chills.  States that symptoms are similar to last month when he was admitted for pneumonia. No other pna symptoms despite CXR changes.BC x2 4/29 NGTD. vancomycins discontinued.  respiratory cultures pending.  CT chest ordered -[facility] concerning for pneumonia possible hiatal hernia and possible anterior vertebral osteophyte near mid esophagus.  SLP consulted  and  barium esophogram to assess for hiatal hernia (second episode of pneumonia since March 2025) and recurrent aspiration as a cause of repeat pneumonia. continue cefepime. completed azithromycin. denies hemoptysis this admission. last episode 2 weeks back. resumed prograf 1mg BID. continue prednisone. hold cellcept for now     Patient has a diagnosis of pneumonia. The cause of the pneumonia is suspected to be bacterial in etiology but organism is not known. The pneumonia is stable. The patient has the following signs/symptoms of pneumonia: cough. The patient does not have a current oxygen requirement and the patient does not have a home oxygen requirement. I have reviewed the pertinent imaging. The following cultures have been collected: Blood cultures and Sputum culture The culture results are listed below.     Current antimicrobial regimen consists of the antibiotics listed below. Will monitor patient closely and continue current treatment plan unchanged.    -Given fevers/chills and recurrent pna, reasonable to continue below abx and deescalate when indicated  -f/u sputum, blood cx    Antibiotics (From admission, onward)      Start     Stop Route Frequency Ordered    05/02/25 1000  ceFEPIme injection 2 g         -- IV Every 12 hours (non-standard times) 05/02/25 0745    04/30/25 0900  azithromycin (ZITHROMAX) 500 mg in 0.9% NaCl 250 mL IVPB (admixture device)         05/01/25 1259 IV Every 24 hours (non-standard times) 04/29/25 1016           Microbiology Results (last 7 days)       Procedure Component Value Units Date/Time    Blood culture x two cultures. Draw prior to antibiotics. [[id number]]  (Normal) Collected: 04/29/25 0758    Order Status: Completed Specimen: Blood from Peripheral, Forearm, Left Updated: 05/03/25 1801     Blood Culture No Growth After 96 hours    Blood culture x two cultures. Draw prior to antibiotics. [[id number]]  (Normal) Collected: 04/29/25 0758    Order Status: Completed Specimen: Blood from Peripheral, Antecubital, Left Updated: 05/03/25 1801     Blood Culture No Growth After 96 hours    Respiratory Infection Panel (PCR), Nasopharyngeal [[id number]] Collected: 05/02/25 1027    Order Status: Completed Specimen: Nasopharyngeal Swab Updated: 05/02/25 1545     Respiratory Infection Panel Source Nasopharyngeal Swab     Adenovirus Not Detected     Coronavirus 229E, Common Cold Virus Not Detected     Coronavirus HKU1, Common Cold Virus Not Detected     Coronavirus NL63, Common Cold Virus Not Detected     Coronavirus OC43, Common Cold Virus Not Detected     SARS-CoV2 (COVID-19) Qualitative PCR Not Detected     Human Metapneumovirus Not Detected     Human Rhinovirus/Enterovirus Not Detected     Influenza A Not Detected     Influenza B Not Detected     Parainfluenza Virus 1 Not Detected     Parainfluenza Virus 2 Not Detected     Parainfluenza Virus 3 Not Detected     Parainfluenza Virus 4 Not Detected     Respiratory Syncytial Virus Not Detected     Bordetella Parapertussis (KX2741) Not Detected     Bordetella pertussis (ptxP) Not Detected     Chlamydia pneumoniae Not Detected     Mycoplasma pneumoniae Not Detected    MRSA Screen by PCR [[id number]]     Order Status: Sent Specimen: Nasal Swab     Culture, Respiratory with Gram Stain [[id number]]     Order Status: Sent Specimen: Respiratory     Culture, Respiratory with Gram Stain [[id number]]     Order Status: Sent Specimen: Respiratory     Influenza A & B by Molecular  [[id number]]  (Normal) Collected: 04/29/25 0749    Order Status: Completed Specimen: Nasal Swab Updated: 04/29/25 0836     INFLUENZA A MOLECULAR Negative     INFLUENZA B MOLECULAR  Negative          5/1  BC x2 4/29 NGTD. vancomycins discontinued.  respiratory cultures pending.  CT chest ordered -[facility] concerning for pneumonia possible hiatal hernia and possible anterior vertebral osteophyte near mid esophagus.  SLP consulted  and consider esophogram to assess for hiatal hernia and recurrent aspiration as a cause of repeat pneumonia. continue cefepime. completed azithromycin. denies hemoptysis this admission. last episode 2 weeks back. resumed prograf 1mg BID. continue prednisone. hold cellcept for now     Patient has a diagnosis of pneumonia. The cause of the pneumonia is suspected to be bacterial in etiology but organism is not known. The pneumonia is stable. The patient has the following signs/symptoms of pneumonia: cough. The patient does not have a current oxygen requirement and the patient does not have a home oxygen requirement. I have reviewed the pertinent imaging. The following cultures have been collected: Blood cultures and Sputum culture The culture results are listed below.     Current antimicrobial regimen consists of the antibiotics listed below. Will monitor patient closely and continue current treatment plan unchanged.    -Given fevers/chills and recurrent pna, reasonable to continue below abx and deescalate when indicated  -f/u sputum, blood cx    Antibiotics (From admission, onward)      Start     Stop Route Frequency Ordered    05/02/25 1000  ceFEPIme injection 2 g         -- IV Every 12 hours (non-standard times) 05/02/25 0745    04/30/25 0900  azithromycin (ZITHROMAX) 500 mg in 0.9% NaCl 250 mL IVPB (admixture device)         05/01/25 1259 IV Every 24 hours (non-standard times) 04/29/25 1016          Antibiotics (From admission, onward)      Start     Stop Route Frequency Ordered    05/02/25  1000  ceFEPIme injection 2 g         -- IV Every 12 hours (non-standard times) 05/02/25 0745    04/30/25 0900  azithromycin (ZITHROMAX) 500 mg in 0.9% NaCl 250 mL IVPB (admixture device)         05/01/25 1259 IV Every 24 hours (non-standard times) 04/29/25 1016            Patient has a diagnosis of pneumonia. The cause of the pneumonia is suspected to be bacterial in etiology but organism is not known. The pneumonia is stable. The patient has the following signs/symptoms of pneumonia: cough. The patient does not have a current oxygen requirement and the patient does not have a home oxygen requirement. I have reviewed the pertinent imaging. The following cultures have been collected: Blood cultures and Sputum culture The culture results are listed below.     Current antimicrobial regimen consists of the antibiotics listed below. Will monitor patient closely and continue current treatment plan unchanged.    -Given fevers/chills and recurrent pna, reasonable to continue below abx and deescalate when indicated  -f/u sputum, blood cx    Antibiotics (From admission, onward)      Start     Stop Route Frequency Ordered    05/02/25 1000  ceFEPIme injection 2 g         -- IV Every 12 hours (non-standard times) 05/02/25 0745    04/30/25 0900  azithromycin (ZITHROMAX) 500 mg in 0.9% NaCl 250 mL IVPB (admixture device)         05/01/25 1259 IV Every 24 hours (non-standard times) 04/29/25 1016          Microbiology Results (last 7 days)       Procedure Component Value Units Date/Time    Blood culture x two cultures. Draw prior to antibiotics. [[id number]]  (Normal) Collected: 04/29/25 0758    Order Status: Completed Specimen: Blood from Peripheral, Forearm, Left Updated: 05/03/25 1801     Blood Culture No Growth After 96 hours    Blood culture x two cultures. Draw prior to antibiotics. [[id number]]  (Normal) Collected: 04/29/25 0758    Order Status: Completed Specimen: Blood from Peripheral, Antecubital, Left Updated: 05/03/25  1801     Blood Culture No Growth After 96 hours    Respiratory Infection Panel (PCR), Nasopharyngeal [[id number]] Collected: 05/02/25 1027    Order Status: Completed Specimen: Nasopharyngeal Swab Updated: 05/02/25 1545     Respiratory Infection Panel Source Nasopharyngeal Swab     Adenovirus Not Detected     Coronavirus 229E, Common Cold Virus Not Detected     Coronavirus HKU1, Common Cold Virus Not Detected     Coronavirus NL63, Common Cold Virus Not Detected     Coronavirus OC43, Common Cold Virus Not Detected     SARS-CoV2 (COVID-19) Qualitative PCR Not Detected     Human Metapneumovirus Not Detected     Human Rhinovirus/Enterovirus Not Detected     Influenza A Not Detected     Influenza B Not Detected     Parainfluenza Virus 1 Not Detected     Parainfluenza Virus 2 Not Detected     Parainfluenza Virus 3 Not Detected     Parainfluenza Virus 4 Not Detected     Respiratory Syncytial Virus Not Detected     Bordetella Parapertussis ([id number]) Not Detected     Bordetella pertussis (ptxP) Not Detected     Chlamydia pneumoniae Not Detected     Mycoplasma pneumoniae Not Detected    MRSA Screen by PCR [[id number]]     Order Status: Sent Specimen: Nasal Swab     Culture, Respiratory with Gram Stain [[id number]]     Order Status: Sent Specimen: Respiratory     Culture, Respiratory with Gram Stain [[id number]]     Order Status: Sent Specimen: Respiratory     Influenza A & B by Molecular [[id number]]  (Normal) Collected: 04/29/25 0749    Order Status: Completed Specimen: Nasal Swab Updated: 04/29/25 0836     INFLUENZA A MOLECULAR Negative     INFLUENZA B MOLECULAR  Negative          5/1  BC x2 4/29 NGTD. vancomycins discontinued.  respiratory cultures pending.  CT chest ordered -[facility] concerning for pneumonia possible hiatal hernia and possible anterior vertebral osteophyte near mid esophagus.  SLP consulted  and consider esophogram to assess for hiatal hernia and recurrent aspiration as a cause of repeat pneumonia. continue  cefepime. completed azithromycin. denies hemoptysis this admission. last episode 2 weeks back. resumed prograf 1mg BID. continue prednisone. hold cellcept for now     Patient has a diagnosis of pneumonia. The cause of the pneumonia is suspected to be bacterial in etiology but organism is not known. The pneumonia is stable. The patient has the following signs/symptoms of pneumonia: cough. The patient does not have a current oxygen requirement and the patient does not have a home oxygen requirement. I have reviewed the pertinent imaging. The following cultures have been collected: Blood cultures and Sputum culture The culture results are listed below.     Current antimicrobial regimen consists of the antibiotics listed below. Will monitor patient closely and continue current treatment plan unchanged.    -Given fevers/chills and recurrent pna, reasonable to continue below abx and deescalate when indicated  -f/u sputum, blood cx    Antibiotics (From admission, onward)      Start     Stop Route Frequency Ordered    05/02/25 1000  ceFEPIme injection 2 g         -- IV Every 12 hours (non-standard times) 05/02/25 0745    04/30/25 0900  azithromycin (ZITHROMAX) 500 mg in 0.9% NaCl 250 mL IVPB (admixture device)         05/01/25 1259 IV Every 24 hours (non-standard times) 04/29/25 1016          Antibiotics (From admission, onward)      Start     Stop Route Frequency Ordered    05/02/25 1000  ceFEPIme injection 2 g         -- IV Every 12 hours (non-standard times) 05/02/25 0745    04/30/25 0900  azithromycin (ZITHROMAX) 500 mg in 0.9% NaCl 250 mL IVPB (admixture device)         05/01/25 1259 IV Every 24 hours (non-standard times) 04/29/25 1016          5/1  PMH of DM, renal transplant 2016, CHFpEF, AF/AFL (PVI/PWI/CTI 6/2024), CVA, recurrent PNA who presents with fevers, chills.  States that symptoms are similar to last month when he was admitted for pneumonia. No other pna symptoms despite CXR changes.BC x2 4/29 NGTD.  vancomycins discontinued.  respiratory cultures pending.  CT chest ordered -[facility] concerning for pneumonia possible hiatal hernia and possible anterior vertebral osteophyte near mid esophagus.  SLP consulted  and  barium esophogram to assess for hiatal hernia (second episode of pneumonia since March 2025) and recurrent aspiration as a cause of repeat pneumonia. continue cefepime. completed azithromycin. denies hemoptysis this admission. last episode 2 weeks back. resumed prograf 1mg BID. continue prednisone. hold cellcept for now     Patient has a diagnosis of pneumonia. The cause of the pneumonia is suspected to be bacterial in etiology but organism is not known. The pneumonia is stable. The patient has the following signs/symptoms of pneumonia: cough. The patient does not have a current oxygen requirement and the patient does not have a home oxygen requirement. I have reviewed the pertinent imaging. The following cultures have been collected: Blood cultures and Sputum culture The culture results are listed below.     Current antimicrobial regimen consists of the antibiotics listed below. Will monitor patient closely and continue current treatment plan unchanged.    -Given fevers/chills and recurrent pna, reasonable to continue below abx and deescalate when indicated  -f/u sputum, blood cx    Antibiotics (From admission, onward)      Start     Stop Route Frequency Ordered    05/02/25 1000  ceFEPIme injection 2 g         -- IV Every 12 hours (non-standard times) 05/02/25 0745    04/30/25 0900  azithromycin (ZITHROMAX) 500 mg in 0.9% NaCl 250 mL IVPB (admixture device)         05/01/25 1259 IV Every 24 hours (non-standard times) 04/29/25 1016          Microbiology Results (last 7 days)       Procedure Component Value Units Date/Time    Blood culture x two cultures. Draw prior to antibiotics. [[id number]]  (Normal) Collected: 04/29/25 1068    Order Status: Completed Specimen: Blood from Peripheral, Forearm, Left  Updated: 05/03/25 1801     Blood Culture No Growth After 96 hours    Blood culture x two cultures. Draw prior to antibiotics. [[id number]]  (Normal) Collected: 04/29/25 0758    Order Status: Completed Specimen: Blood from Peripheral, Antecubital, Left Updated: 05/03/25 1801     Blood Culture No Growth After 96 hours    Respiratory Infection Panel (PCR), Nasopharyngeal [[id number]] Collected: 05/02/25 1027    Order Status: Completed Specimen: Nasopharyngeal Swab Updated: 05/02/25 1545     Respiratory Infection Panel Source Nasopharyngeal Swab     Adenovirus Not Detected     Coronavirus 229E, Common Cold Virus Not Detected     Coronavirus HKU1, Common Cold Virus Not Detected     Coronavirus NL63, Common Cold Virus Not Detected     Coronavirus OC43, Common Cold Virus Not Detected     SARS-CoV2 (COVID-19) Qualitative PCR Not Detected     Human Metapneumovirus Not Detected     Human Rhinovirus/Enterovirus Not Detected     Influenza A Not Detected     Influenza B Not Detected     Parainfluenza Virus 1 Not Detected     Parainfluenza Virus 2 Not Detected     Parainfluenza Virus 3 Not Detected     Parainfluenza Virus 4 Not Detected     Respiratory Syncytial Virus Not Detected     Bordetella Parapertussis ([id number]) Not Detected     Bordetella pertussis (ptxP) Not Detected     Chlamydia pneumoniae Not Detected     Mycoplasma pneumoniae Not Detected    MRSA Screen by PCR [[id number]]     Order Status: Sent Specimen: Nasal Swab     Culture, Respiratory with Gram Stain [[id number]]     Order Status: Sent Specimen: Respiratory     Culture, Respiratory with Gram Stain [[id number]]     Order Status: Sent Specimen: Respiratory     Influenza A & B by Molecular [[id number]]  (Normal) Collected: 04/29/25 0749    Order Status: Completed Specimen: Nasal Swab Updated: 04/29/25 0836     INFLUENZA A MOLECULAR Negative     INFLUENZA B MOLECULAR  Negative          5/1  BC x2 4/29 NGTD. vancomycins discontinued.  respiratory cultures pending.   CT chest ordered -[facility] concerning for pneumonia possible hiatal hernia and possible anterior vertebral osteophyte near mid esophagus.  SLP consulted  and consider esophogram to assess for hiatal hernia and recurrent aspiration as a cause of repeat pneumonia. continue cefepime. completed azithromycin. denies hemoptysis this admission. last episode 2 weeks back. resumed prograf 1mg BID. continue prednisone. hold cellcept for now     Patient has a diagnosis of pneumonia. The cause of the pneumonia is suspected to be bacterial in etiology but organism is not known. The pneumonia is stable. The patient has the following signs/symptoms of pneumonia: cough. The patient does not have a current oxygen requirement and the patient does not have a home oxygen requirement. I have reviewed the pertinent imaging. The following cultures have been collected: Blood cultures and Sputum culture The culture results are listed below.     Current antimicrobial regimen consists of the antibiotics listed below. Will monitor patient closely and continue current treatment plan unchanged.    -Given fevers/chills and recurrent pna, reasonable to continue below abx and deescalate when indicated  -f/u sputum, blood cx    Antibiotics (From admission, onward)      Start     Stop Route Frequency Ordered    05/02/25 1000  ceFEPIme injection 2 g         -- IV Every 12 hours (non-standard times) 05/02/25 0745    04/30/25 0900  azithromycin (ZITHROMAX) 500 mg in 0.9% NaCl 250 mL IVPB (admixture device)         05/01/25 1259 IV Every 24 hours (non-standard times) 04/29/25 1016          Antibiotics (From admission, onward)      Start     Stop Route Frequency Ordered    05/02/25 1000  ceFEPIme injection 2 g         -- IV Every 12 hours (non-standard times) 05/02/25 0745    04/30/25 0900  azithromycin (ZITHROMAX) 500 mg in 0.9% NaCl 250 mL IVPB (admixture device)         05/01/25 1259 IV Every 24 hours (non-standard times) 04/29/25 1016          Type 2  "diabetes mellitus with stage 3a chronic kidney disease, with long-term current use of insulin  Creatine stable for now. BMP reviewed- noted Estimated Creatinine Clearance: 30 mL/min (A) (based on SCr of 2.7 mg/dL (H)). according to latest data. Based on current GFR, CKD stage is stage 3 - GFR 30-59.  Monitor UOP and serial BMP and adjust therapy as needed. Renally dose meds. Avoid nephrotoxic medications and procedures.  Hyperlipidemia  Continue statin    Prophylactic immunotherapy  KTM consutled for assistance with management     KTM consutled for assistance with management     KTM consutled for assistance with management     Chronic combined systolic (congestive) and diastolic (congestive) heart failure  Patient has Diastolic (HFpEF) heart failure that is Chronic. On presentation their CHF was well compensated. Most recent BNP and echo results are listed below.  No results for input(s): "BNP" in the last 72 hours.  Latest ECHO  Results for orders placed during the hospital encounter of 02/20/25    Echo    Interpretation Summary    Left Ventricle: There is mild concentric hypertrophy. There is low normal systolic function with a visually estimated ejection fraction of 50 - 55%. Grade III diastolic dysfunction.    Left Ventricle: The left ventricle is at the upper limits of normal in size. Mildly increased wall thickness. There is mild concentric hypertrophy. Regional wall motion abnormalities present. See diagram for wall motion findings. There is low normal systolic function with a visually estimated ejection fraction of 50 - 55%. Ejection fraction is approximately 53%. Grade III diastolic dysfunction.    Right Ventricle: Systolic function is borderline low despite the low TAPSE.    Left Atrium: Left atrium is moderately dilated.    Aortic Valve: The aortic valve is a trileaflet valve. There is mild aortic valve sclerosis. There is moderate annular calcification present. There is mild aortic regurgitation with a " centrally directed jet.    Mitral Valve: There is mild regurgitation with a centrally directed jet.    Pericardium: There is a trivial posterior effusion and under the RA.    Tricuspid Valve: There is mild regurgitation.    Pulmonary Artery: The estimated pulmonary artery systolic pressure is 30 mmHg.    Current Heart Failure Medications  hydrALAZINE tablet 100 mg, Every 8 hours, Oral  empagliflozin (Jardiance) tablet 10 mg, Daily, Oral  hydrALAZINE injection 15 mg, Every 6 hours PRN, Intravenous  torsemide tablet 40 mg, Daily, Oral  valsartan tablet 320 mg, Daily, Oral    Plan  - Monitor strict I&Os and daily weights.    - Place on telemetry  - Low sodium diet  - Cardiology has not been consulted  - The patient's volume status is at their baseline  S/P cadaveric kidney transplant 11/5/2016. ESRD secondary to HTN/DMII  -KTM consulted  -daily tacro level, dosing per KTM  -Cont prednisone    Immunocompromised state due to drug therapy  KTM consutled for assistance with management     KTM consutled for assistance with management     KTM consutled for assistance with management     CKD IV (severe)  Creatine stable for now. BMP reviewed- noted Estimated Creatinine Clearance: 30 mL/min (A) (based on SCr of 2.7 mg/dL (H)). according to latest data. Based on current GFR, CKD stage is stage 3 - GFR 30-59.  Monitor UOP and serial BMP and adjust therapy as needed. Renally dose meds. Avoid nephrotoxic medications and procedures.    Recent Labs   Lab 05/03/25  0237 05/03/25  1627 05/04/25  0233   BUN 45* 43* 43*   CREATININE 2.6* 2.8* 2.7*       I & O     Intake/Output Summary (Last 24 hours) at 5/4/2025 1232  Last data filed at 5/3/2025 1300  Gross per 24 hour   Intake 240 ml   Output --   Net 240 ml      5/3 KTM f/u - Cr elevated from his baseline - suspicion for failing allograft and approaching need for dialysis .  hold valsartan for now. monitor BP  Type 2 diabetes mellitus  Patient's FSGs are controlled on current  medication regimen.  Last A1c reviewed-   Lab Results   Component Value Date    HGBA1C 8.3 (H) 03/27/2025     Most recent fingerstick glucose reviewed-   Recent Labs   Lab 05/03/25  1638 05/03/25  2059 05/04/25  0817   POCTGLUCOSE 148* 115* 160*     Current correctional scale  Medium  Maintain anti-hyperglycemic dose as follows-   Antihyperglycemics (From admission, onward)      Start     Stop Route Frequency Ordered    04/29/25 2100  insulin glargine U-100 (Lantus) pen 15 Units         -- SubQ Nightly 04/29/25 1016    04/29/25 1115  empagliflozin (Jardiance) tablet 10 mg        Question Answer Comment   Does this patient have a diagnosis of heart failure? Yes    Does this patient have type 1 diabetes or diabetic ketoacidosis? No    Does this patient have symptomatic hypotension? No    Is the patient NPO or pending major surgery in next 3 days or less? No        -- Oral Daily 04/29/25 1016    04/29/25 1113  insulin aspart U-100 pen 0-10 Units         -- SubQ Before meals & nightly PRN 04/29/25 1016          Hold Oral hypoglycemics while patient is in the hospital.    Blood glucose acceptable  Persistent atrial fibrillation  Patient has persistent (7 days or more) atrial fibrillation. Patient is currently in atrial fibrillation. [id number] Score: 3. The patients heart rate in the last 24 hours is as follows:  Pulse  Min: 78  Max: 100     Antiarrhythmics       Anticoagulants  rivaroxaban tablet 15 mg, With dinner, Oral    Plan  - Replete lytes with a goal of K>4, Mg >2  - Patient is anticoagulated, see medications listed above.  - Patient's afib is currently controlled  Anticoagulant long-term use  This patient has long term use on an anticoagulant with Select Anticoagulant(s): Direct oral anticoagulant: Rivaroxaban (Xarelto). Their long term anticoagulation will be Held or Continued: continued. They are on long term anticoagulation due to Reason for Anticoagulation: Atrial fibrillation.   BPH with urinary  obstruction  Doxazosin   Immunosuppressed status  KTM consutled for assistance with management     KTM consutled for assistance with management     KTM consutled for assistance with management     Anemia of chronic disease  Anemia is likely due to chronic disease due to Chronic Kidney Disease. Most recent hemoglobin and hematocrit are listed below.  Recent Labs     05/02/25  0215 05/03/25  0237 05/04/25  0233   HGB 8.5* 8.4* 8.6*   HCT 29.0* 29.1* 29.8*     Plan  - Monitor serial CBC: Daily  - Transfuse PRBC if patient becomes hemodynamically unstable, symptomatic or H/H drops below 7/21.  - Patient has not received any PRBC transfusions to date  - Patient's anemia is currently stable  Hypokalemia  Patient's most recent potassium results are listed below.   Recent Labs     05/03/25  0237 05/03/25  1627 05/04/25  0233   K 3.2* 4.1 3.4*     Plan  - Replete potassium per protocol  - Monitor potassium Daily  - Patient's hypokalemia is stable  Pleural effusion  5/2 CT chest -Bilateral multilobar consolidations and ground-glass opacities associated with bronchial thickening suggestive of infectious/inflammatory process. Right moderate and left small pleural effusions.  ID consulted for recurrent pneumonia/ immunosuppressed/ hemoptysis.    Hypertensive urgency  Patient has a current diagnosis of hypertensive urgency (without evidence of end organ damage) which is uncontrolled.  Latest blood pressure and vitals reviewed-   Temp:  [97.7 °F (36.5 °C)-98.3 °F (36.8 °C)]   Pulse:  []   Resp:  [18-19]   BP: (139-205)/(71-91)   SpO2:  [97 %-99 %] .   Patient currently on IV antihypertensives.   Home meds for hypertension were reviewed and noted below.   Hypertension Medications              doxazosin (CARDURA) 4 MG tablet Take 1 tablet (4 mg total) by mouth once daily.    furosemide (LASIX) 40 MG tablet Take 2 tablets (80 mg total) by mouth daily as needed (for swelling).    hydrALAZINE (APRESOLINE) 100 MG tablet Take 1  tablet (100 mg total) by mouth every 8 (eight) hours.    hydroCHLOROthiazide 12.5 MG Tab Take 1 tablet (12.5 mg total) by mouth once daily.    valsartan (DIOVAN) 320 MG tablet Take 1 tablet (320 mg total) by mouth once daily.            5/3 SBP 200s overnight. started on IV hydralzine. received IV hydralazine 15mg x 2 doses. /84.   5/4 SBP 200s overnight. received hydralazine 15mg x 1 overnight. discussed with KTM. Cr stable. resume valsartan. Clonidine 0.1 mg PRN for SBP > 170mm HG     VTE Risk Mitigation (From admission, onward)           Ordered     rivaroxaban tablet 15 mg  With dinner         04/29/25 1016     IP VTE HIGH RISK PATIENT  Once         04/29/25 1016     Place sequential compression device  Until discontinued         04/29/25 1016                    Discharge Planning   UBALDO: 5/7/2025     Code Status: Full Code   Medical Readiness for Discharge Date:   Discharge Plan A: Home with family                        Cliff Guaman MD  Department of Hospital Medicine   [facility]         [1]   Current Facility-Administered Medications on File Prior to Encounter   Medication Dose Route Frequency Provider Last Rate Last Admin    balanced salt irrigation intra-ocular solution 1 drop  1 drop Right Eye On Call Procedure Jennifer Plaacio MD        phenylephrine HCL 10% ophthalmic solution 1 drop  1 drop Right Eye PRN Jennifer Palacio MD        proparacaine 0.5 % ophthalmic solution 1 drop  1 drop Right Eye Daily PRN Jennifer Palacio MD        sodium chloride 0.9% flush 10 mL  10 mL Intravenous PRN Jennifer Palacio MD        TETRAcaine HCl (PF) 0.5 % Drop 1 drop  1 drop Right Eye PRN Jennifer Palacio MD         Current Outpatient Medications on File Prior to Encounter   Medication Sig Dispense Refill    atorvastatin (LIPITOR) 80 MG tablet Take 1 tablet (80 mg total) by mouth once daily. 90 tablet 3    blood sugar diagnostic Strp Use to check blood glucose 1 times daily, to use with  insurance preferred meter 100 each 11    blood-glucose meter Misc Use to check blood glucose 1 times daily, to use with insurance preferred meter 1 each 0    blood-glucose sensor (DEXCOM G7 SENSOR) Kayla Change sensor every 10 days. 3 each 11    doxazosin (CARDURA) 4 MG tablet Take 1 tablet (4 mg total) by mouth once daily. 90 tablet 3    empagliflozin (JARDIANCE) 10 mg tablet Take 1 tablet (10 mg total) by mouth once daily. 90 tablet 0    ergocalciferol (ERGOCALCIFEROL) 50,000 unit Cap Take 1 capsule (50,000 Units total) by mouth every 7 days. 4 capsule 6    furosemide (LASIX) 40 MG tablet Take 2 tablets (80 mg total) by mouth daily as needed (for swelling).      gabapentin (NEURONTIN) 300 MG capsule Take 1 capsule by mouth in the morning, 1 capsule midday, and 3 capsules at night. 450 capsule 3    hydrALAZINE (APRESOLINE) 100 MG tablet Take 1 tablet (100 mg total) by mouth every 8 (eight) hours. 270 tablet 3    hydroCHLOROthiazide 12.5 MG Tab Take 1 tablet (12.5 mg total) by mouth once daily. 30 tablet 11    insulin aspart U-100 (NOVOLOG U-100 INSULIN ASPART) 100 unit/mL injection Use in pump, max daily 100 units. 30 mL 11    insulin aspart U-100 (NOVOLOG) 100 unit/mL (3 mL) InPn pen Inject 10 units under the skin w/ breakfast, 7 units at lunch and dinner. Scale 180-230+2, 231-280+4, 281-330+6, 331-380+8, >380+10.  Max daily 57 units. (Patient not taking: Reported on 4/22/2025) 30 mL 11    insulin glargine U-100, Lantus, (LANTUS SOLOSTAR U-100 INSULIN) 100 unit/mL (3 mL) InPn pen Inject 20 Units into the skin every morning. (Patient not taking: Reported on 4/22/2025) 15 mL 6    insulin  cart,aut,G6/7,cntr (OMNIPOD 5 G6-G7 INTRO KT,GEN5,) Crtg Use as directed. 1 each 1    insulin pump cart,auto,BT,G6/7 (OMNIPOD 5 G6-G7 PODS, GEN 5,) Crtg Change every 48 hours. 45 each 3    Lactobacillus rhamnosus GG (CULTURELLE) 10 billion cell capsule Take 1 capsule by mouth once daily. 30 capsule 0    lancets 30 gauge Misc Use  "to check blood glucose 1 times daily, to use with insurance preferred meter 100 each 3    magnesium oxide (MAG-OX) 400 mg (241.3 mg magnesium) tablet Take 1 tablet (400 mg total) by mouth 2 (two) times daily. 60 tablet 11    meclizine (ANTIVERT) 25 mg tablet Take 1 tablet (25 mg total) by mouth 3 (three) times daily as needed for Dizziness. 21 tablet 3    [Paused] mycophenolate (CELLCEPT) 250 mg Cap Take 2 capsules (500 mg total) by mouth 2 (two) times daily. 120 capsule 11    pen needle, diabetic (BD ULTRA-FINE LO PEN NEEDLE) 32 gauge x 5/32" Ndle USE TO ADMINISTER INSULIN 4 TIMES DAILY. 400 each 3    polyethylene glycol (MIRALAX) 17 gram PwPk Take 17 gm (mixed in liquid) by mouth every day.      predniSONE (DELTASONE) 5 MG tablet Take 1 tablet (5 mg total) by mouth once daily. 30 tablet 11    rivaroxaban (XARELTO) 15 mg Tab Take 1 tablet (15 mg total) by mouth daily with dinner or evening meal. 30 tablet 11    tacrolimus (PROGRAF) 1 MG Cap Take 1 capsule (1 mg total) by mouth every 12 (twelve) hours. 60 capsule 11    valsartan (DIOVAN) 320 MG tablet Take 1 tablet (320 mg total) by mouth once daily. 90 tablet 3    [DISCONTINUED] insulin lispro (HUMALOG KWIKPEN INSULIN) 100 unit/mL pen Inject 18 units w/ breakfast, 16 units w/ lunch and dinner plus scale 150-200 +2, 201-250 +4, 251-300 +6, 301-350 +8. 30 mL 4     "

## 2025-05-04 NOTE — DISCHARGE SUMMARY
[facility] Medicine  Discharge Summary      Patient Name: Ravi Zamorano  MRN: [id number]  MARISOL: [id number]  Patient Class: IP- Inpatient  Admission Date: 4/29/2025  Hospital Length of Stay: 9 days  Discharge Date and Time: 05/10/2025   Attending Physician: Cliff Guaman MD   Discharging Provider: Nicola Arzate MD  Primary Care Provider: Mima Mack MD  Primary Care Team: [facility]    HPI:   This is a 69-year-old male with a history of DM, renal transplant 2016, CHFpEF, AF/AFL (PVI/PWI/CTI 6/2024), CVA, recurrent PNA who presents with fevers, chills.  States that symptoms are similar to last month when he was admitted for pneumonia.  Was in his usual state of health until last night when he started experiencing intermittent fevers, chills.  Wife states she also noticed that he has had increased intermittent cough over the past day or so.  Patient did not notice this.  Denies any production.  Also denies any shortness for breath, wheezing, chest pain, abdominal pain, diarrhea, nausea, vomiting, dysuria.  Compliant with all medications.  Trace lower extremity swelling is chronic.    ED course:  On room air.  Lab work significant for Cr 2.7, same as previous. CXR showed development of RLL consolidation.  Received IV vancomycin, azithromycin, Zosyn.  Septic workup obtained.    * No surgery found *      Hospital Course:   Patient admitted due to fever, fatigue and chills at home. He has not had any cough or sob at home. He was recently treated for PNA at the end of march and was feeling a lot better since then, until he had a sudden onset of symptoms. He did describe some productive cough for several morning and the mucus he coughed op had rust colored blood as well as bright red blood sometimes. Theses episodes only happened shortly after wakening in the morning and didn't persist during the day, no associated sob or other symptoms during those times. Denies nasal drainage,  congestion, or nose bleeds.       Started on broad spectrum and feeling a lot better. Still on RA and no cough, wheezes or sob     CT chest ordered to get a better look at the consolidations and possible hemoptysis, will considered pulmonology and/or ID consulted to help direct care    5/1  PMH of DM, renal transplant 2016, CHFpEF, AF/AFL (PVI/PWI/CTI 6/2024), CVA, recurrent PNA who presents with fevers, chills.  States that symptoms are similar to last month when he was admitted for pneumonia. No other pna symptoms despite CXR changes.BC x2 4/29 NGTD. vancomycins discontinued.  respiratory cultures pending.  CT chest ordered -RML GGO concerning for pneumonia possible hiatal hernia and possible anterior vertebral osteophyte near mid esophagus.  SLP consulted. MBSS and barium esophogram to assess for hiatal hernia (second episode of pneumonia since March 2025) and recurrent aspiration as a cause of repeat pneumonia. continue cefepime. completed azithromycin. denies hemoptysis this admission. last episode 2 weeks back. resumed prograf 1mg BID. continue prednisone. hold cellcept for now   5/2 s/p FEES. SLP recs regular diet/thin liquids CT chest -Bilateral multilobar consolidations and ground-glass opacities associated with bronchial thickening suggestive of infectious/inflammatory process. Right moderate and left small pleural effusions.  ID consulted for recurrent pneumonia/ immunosuppressed/ hemoptysis.  SBP 160s-180s. coreg previously due to bradycardia during hospitalization 2/202. discontinued Spironolactone 25 mg daily -  3/2024 due to getting LH and dizzy . procardia listed as allergy. Doxazosin increased to 8mg daiy. . HCTZ discontinued. started on torsemide 40mg daily. ID eval  -  resp infection panel and urine legionella  MRSA nares, resp culture  5/3 SBP 200s overnight. started on IV hydralzine. received IV hydralazine 15mg x 2 doses. BP  132/67 . K replaced. RIP negative. EKG -Atrial fibrillation with  premature ventricular or aberrantly conducted complexe. Low voltage QRS. KTM f/u - Cr elevated from his baseline - suspicion for failing allograft and approaching need for dialysis .  hold valsartan for now. monitor BP  5/4 K replaced. SBP 200s overnight. received hydralazine 15mg x 1 overnight. discussed with KTM. Cr stable. resume valsartan. Clonidine 0.1 mg PRN for SBP > 170mm HG. continue  cefepime for 7 day course (end date 5/5/25)  5/5 complete cefepime today. CR stable. SBP 180s overnight. started on clonidine patch and  eplerenone 50mg daily.   per radiology, small hiatal hernia visible on their CT chest 5/1/25. reports left LE pain with ambulation. WENDI ordered   5/6 SBP 160s. K replaced.  monitor. WENDI -Right Leg: Segmental pressures and PVR waveforms suggest moderate peripheral arterial occlusive disease. Rt Great Toe: The PPG waveform as described above. - TBI of 0.55 with a great toe pressure of 95 mmHg is noted. Left Leg: Segmental pressures and PVR waveforms suggest moderate peripheral arterial occlusive disease. Lt Great Toe: The PPG waveform as described above. - TBI of 0.59 with a great toe pressure of 101 mmHg is noted.  Endocrine consulted for insulin pump. Hold Cellcept - resume after one month. monitor blood pressure  5/7 Cr trended up to 3.4. Valsartan held. Sono transplant kidney-Mildly elevated intraparenchymal resistive indices which may be seen with medical renal disease, rejection, or drug toxicity. . BP improved. KTM f/u  - recs 500cc bolus of LR, changing his torsemide back to HCTZ 25mg daily, and restarting his valsartan. monitor renal function in AM     Kidney transplant satisfied with patient's renal function recommended holding CellCept until follow-up given recurrent pneumonia but continuation of home tacrolimus dose at discharge.  Antihypertensive regimen adjusted while inpatient continued at discharge.      Patient deemed appropriate for discharge. I personally saw, examined, and  evaluated patient prior to departure. Plan discussed with patient, who was agreeable and amenable; medications were discussed and reviewed, outpatient follow-up scheduled, ER precautions were given, all questions were answered to the patient's satisfaction, and Ravi Zamorano was subsequently discharged.         Goals of Care Treatment Preferences:  Code Status: Full Code      SDOH Screening:  The patient was screened for utility difficulties, food insecurity, transport difficulties, housing insecurity, and interpersonal safety and there were no concerns identified this admission.     Consults:   Consults (From admission, onward)          Status Ordering Provider     Inpatient consult to Endocrinology  Once        Provider:  (Not yet assigned)    Completed BAKARI BREWER     Inpatient consult to Infectious Diseases  Once        Provider:  (Not yet assigned)    Completed BAKARI BREWER     Inpatient consult to Pulmonology  Once        Provider:  (Not yet assigned)    Completed BAKARI BREWER.     Inpatient consult to Kidney/Pancreas Transplant Medicine  Once        Provider:  (Not yet assigned)    Completed ALINA BROWN            Assessment & Plan    See prior progress notes.      Final Active Diagnoses:    Diagnosis Date Noted POA    PRINCIPAL PROBLEM:  Community acquired bacterial pneumonia [J15.9] 04/29/2025 Yes    Insulin pump in place [Z96.41] 05/06/2025 Not Applicable    Opportunistic infection [B99.9] 05/06/2025 Unknown    Pleural effusion [J90] 05/02/2025 Yes    Hemoptysis [R04.2] 04/30/2025 Yes    Pneumonia of left lower lobe due to infectious organism [J18.9] 03/16/2025 Yes    Hypokalemia [E87.6] 02/20/2025 Yes    Anemia of chronic disease [D63.8] 06/26/2024 Yes    Immunosuppressed status [D84.9] 04/01/2024 Yes    PAD (peripheral artery disease) [I73.9] 02/09/2023 Yes    BPH with urinary obstruction [N40.1, N13.8] 11/25/2020 Yes    Anticoagulant long-term use [Z79.01] 10/03/2019 Not  Applicable    Persistent atrial fibrillation [I48.19] 08/09/2019 Yes    Hypertensive urgency [I16.0] 03/15/2019 Yes    Type 2 diabetes mellitus [E11.9] 11/27/2017 Yes    CKD IV (severe) [N18.4] 12/28/2016 Yes    Immunocompromised state due to drug therapy [D84.821, Z79.899] 11/07/2016 Not Applicable    S/P cadaveric kidney transplant 11/5/2016. ESRD secondary to HTN/DMII [Z94.0] 11/05/2016 Not Applicable    Chronic combined systolic (congestive) and diastolic (congestive) heart failure [I50.42] 10/24/2014 Yes    Prophylactic immunotherapy [Z29.89] 09/16/2014 Not Applicable    Type 2 diabetes mellitus with stage 3a chronic kidney disease, with long-term current use of insulin [E11.22, N18.31, Z79.4] 07/29/2014 Not Applicable    Hyperlipidemia [E78.5] 07/29/2014 Yes      Problems Resolved During this Admission:    Diagnosis Date Noted Date Resolved POA    Essential hypertension [I10] 07/29/2014 05/03/2025 Yes       Discharged Condition: fair    Disposition: home     Follow Up:   Follow-up Information       Provider, Anderson .    Specialty: Internal Medicine  Contact information:  [address] [address]                           Patient Instructions:      Ambulatory referral/consult to [facility]   Standing Status: Future   Referral Priority: Routine Referral Type: Consultation   Referred to Provider: ANDERSON PROVIDER Requested Specialty: Internal Medicine   Number of Visits Requested: 1     Ambulatory referral/consult to Transplant, Kidney   Standing Status: Future   Referral Priority: Routine Referral Type: Transplants   Number of Visits Requested: 1     Ambulatory referral/consult to Vascular Surgery   Standing Status: Future   Referral Priority: Routine Referral Type: Consultation   Referral Reason: Specialty Services Required   Requested Specialty: Vascular Surgery   Number of Visits Requested: 1       Significant Diagnostic Studies: Labs:  Reviewed prior to  discharge      VAS Ankle Brachial Indices Resting  Indication  ========    PAD    Pressure Lower  ============    Rt brachial A syst BP  172 mmHg  Rt low thigh BP    171 mmHg  Rt calf  mmHg  Rt PTA BP  157 mmHg  Rt dors pedis A  mmHg  Rt toe BP  95 mmHg  Rt WENDI post tibial (rt post tib A BP / max brach A BP) 0.91  Rt WENDI (rt dors ped A BP / max brach A BP) 0.86  Rt ankle BP / max brach A BP   0.91  Rt TBI (rt toe BP / max brach A BP)    0.55  Lt brachial A syst BP  172 mmHg  Lt low thigh BP    180 mmHg  Lt calf  mmHg  Lt PTA BP  135 mmHg  Lt dors pedis A  mmHg  Lt toe BP  101 mmHg  Lt WENDI (lt post tibial A BP / max brach A BP)  0.78  Lt WENDI dors ped (lt dors ped A BP / max brach A BP)    0.68  Lt ankle BP / max brach A BP   0.78  Lt TBI (lt toe BP / max brach A BP)    0.59    PPG Lower  =========    Rt toe BP - PPG    95 mmHg  Rt toe digit waveform: mildly dampened  Lt toe BP - PPG    101 mmHg  Lt toe digit waveform: mildly dampened    Impression  =========    Right Leg: Segmental pressures and PVR waveforms suggest moderate peripheral arterial occlusive disease.  Rt Great Toe: The PPG waveform as described above. - TBI of 0.55 with a great toe pressure of 95 mmHg is noted.    Left Leg: Segmental pressures and PVR waveforms suggest moderate peripheral arterial occlusive disease.  Lt Great Toe: The PPG waveform as described above. - TBI of 0.59 with a great toe pressure of 101 mmHg is noted.    DATE OF SERVICE: 05/06/2025                                                      Sonographer: Yaneth Soto  Electronically Signed by: REENA Garcia M.D. at 05/06/2025-13:01       Pending Diagnostic Studies:       Procedure Component Value Units Date/Time    Troponin I High Sensitivity [[id number]]     Order Status: Sent Lab Status: No result     Specimen: Blood     US Transplant Kidney With Doppler [[id number]] Resulted: 05/07/25 1054    Order Status: Sent Lab Status: In process Updated: 05/07/25 1493  "          Medications:  Reconciled Home Medications:      Medication List        PAUSE taking these medications      mycophenolate 250 mg Cap  Wait to take this until your doctor or other care provider tells you to start again.  Commonly known as: CELLCEPT  Take 2 capsules (500 mg total) by mouth 2 (two) times daily.            START taking these medications      cloNIDine 0.1 mg/24 hr td ptwk 0.1 mg/24 hr  Commonly known as: CATAPRES  Place 1 patch onto the skin every 7 days.     eplerenone 50 MG Tab  Commonly known as: INSPRA  Take 1 tablet (50 mg total) by mouth once daily.            CHANGE how you take these medications      doxazosin 8 MG Tab  Commonly known as: CARDURA  Take 1 tablet (8 mg total) by mouth once daily.  What changed:   medication strength  how much to take     gabapentin 300 MG capsule  Commonly known as: NEURONTIN  Take 1 capsule (300 mg total) by mouth 2 (two) times daily.  What changed:   how much to take  how to take this  when to take this  additional instructions     hydroCHLOROthiazide 25 MG tablet  Commonly known as: HYDRODIURIL  Take 1 tablet (25 mg total) by mouth once daily.  What changed:   medication strength  how much to take     NovoLOG U-100 Insulin aspart 100 unit/mL injection  Generic drug: insulin aspart U-100  Use in pump, max daily 100 units.  What changed: Another medication with the same name was removed. Continue taking this medication, and follow the directions you see here.            CONTINUE taking these medications      atorvastatin 80 MG tablet  Commonly known as: LIPITOR  Take 1 tablet (80 mg total) by mouth once daily.     BD ULTRA-FINE LO PEN NEEDLE 32 gauge x 5/32" Ndle  Generic drug: pen needle, diabetic  USE TO ADMINISTER INSULIN 4 TIMES DAILY.     DEXCOM G7 SENSOR Kayla  Generic drug: blood-glucose sensor  Change sensor every 10 days.     hydrALAZINE 100 MG tablet  Commonly known as: APRESOLINE  Take 1 tablet (100 mg total) by mouth every 8 (eight) hours.   "   JARDIANCE 10 mg tablet  Generic drug: empagliflozin  Take 1 tablet (10 mg total) by mouth once daily.     Lactobacillus rhamnosus GG 10 billion cell capsule  Commonly known as: CULTURELLE  Take 1 capsule by mouth once daily.     magnesium oxide 400 mg (241.3 mg magnesium) tablet  Commonly known as: MAG-OX  Take 1 tablet (400 mg total) by mouth 2 (two) times daily.     meclizine 25 mg tablet  Commonly known as: ANTIVERT  Take 1 tablet (25 mg total) by mouth 3 (three) times daily as needed for Dizziness.     MIRALAX 17 gram Pwpk  Generic drug: polyethylene glycol  Take 17 gm (mixed in liquid) by mouth every day.     OMNIPOD 5 G6-G7 INTRO KT(GEN5) Crtg  Generic drug: insulin  cart,aut,G6/7,cntr  Use as directed.     OMNIPOD 5 G6-G7 PODS (GEN 5) Crtg  Generic drug: insulin pump cart,auto,BT,G6/7  Change every 48 hours.     predniSONE 5 MG tablet  Commonly known as: DELTASONE  Take 1 tablet (5 mg total) by mouth once daily.     tacrolimus 1 MG Cap  Commonly known as: PROGRAF  Take 1 capsule (1 mg total) by mouth every 12 (twelve) hours.     TRUE METRIX GLUCOSE METER Misc  Generic drug: blood-glucose meter  Use to check blood glucose 1 times daily, to use with insurance preferred meter     TRUE METRIX GLUCOSE TEST STRIP Strp  Generic drug: blood sugar diagnostic  Use to check blood glucose 1 times daily, to use with insurance preferred meter     TRUEPLUS LANCETS 30 gauge Misc  Generic drug: lancets  Use to check blood glucose 1 times daily, to use with insurance preferred meter     valsartan 320 MG tablet  Commonly known as: DIOVAN  Take 1 tablet (320 mg total) by mouth once daily.     VITAMIN D2 1,250 mcg (50,000 unit) capsule  Generic drug: ergocalciferol  Take 1 capsule (50,000 Units total) by mouth every 7 days.     XARELTO 15 mg Tab  Generic drug: rivaroxaban  Take 1 tablet (15 mg total) by mouth daily with dinner or evening meal.            STOP taking these medications      furosemide 40 MG tablet  Commonly  known as: LASIX     LANTUS SOLOSTAR U-100 INSULIN 100 unit/mL (3 mL) Inpn pen  Generic drug: insulin glargine U-100 (Lantus)              Indwelling Lines/Drains at time of discharge:   Lines/Drains/Airways       None                   45 minutes of time spent on discharge, including examining the patient, providing discharge instructions, arranging follow up and documentation

## 2025-05-04 NOTE — SUBJECTIVE & OBJECTIVE
Interval History: feeling well today, no new symptoms    Review of Systems   Constitutional:  Negative for chills and fever.   Respiratory:  Negative for cough and shortness of breath.    Cardiovascular:  Negative for chest pain.   Gastrointestinal:  Negative for abdominal pain, constipation, diarrhea, nausea and vomiting.   Musculoskeletal:  Negative for arthralgias and myalgias.   Skin:  Negative for rash.   Neurological:  Negative for headaches.     Objective:     Vital Signs (Most Recent):  Temp: 98.3 °F (36.8 °C) (05/04/25 0737)  Pulse: 79 (05/04/25 0737)  Resp: 18 (05/04/25 0737)  BP: (!) 171/77 (05/04/25 0737)  SpO2: 97 % (05/04/25 0737) Vital Signs (24h Range):  Temp:  [97.7 °F (36.5 °C)-98.3 °F (36.8 °C)] 98.3 °F (36.8 °C)  Pulse:  [] 79  Resp:  [18-19] 18  SpO2:  [97 %-99 %] 97 %  BP: (132-205)/(67-91) 171/77     Weight: 90.7 kg (200 lb)  Body mass index is 26.39 kg/m².    Estimated Creatinine Clearance: 30 mL/min (A) (based on SCr of 2.7 mg/dL (H)).     Physical Exam  Vitals reviewed.   Constitutional:       General: He is not in acute distress.     Appearance: Normal appearance. He is not ill-appearing.   HENT:      Head: Normocephalic and atraumatic.   Eyes:      Extraocular Movements: Extraocular movements intact.      Conjunctiva/sclera: Conjunctivae normal.   Cardiovascular:      Rate and Rhythm: Normal rate and regular rhythm.      Heart sounds: No murmur heard.  Pulmonary:      Effort: Pulmonary effort is normal. No respiratory distress.      Breath sounds: Normal breath sounds.   Abdominal:      General: Abdomen is flat. Bowel sounds are normal.      Palpations: Abdomen is soft.      Tenderness: There is no abdominal tenderness.   Musculoskeletal:      Cervical back: Normal range of motion.   Skin:     General: Skin is warm and dry.   Neurological:      General: No focal deficit present.      Mental Status: He is alert and oriented to person, place, and time.   Psychiatric:         Mood and  Affect: Mood normal.         Behavior: Behavior normal.         Thought Content: Thought content normal.          Significant Labs: All pertinent labs within the past 24 hours have been reviewed.  Recent Lab Results  (Last 5 results in the past 24 hours)        05/04/25  0817   05/04/25  0233   05/03/25  2059   05/03/25  1638   05/03/25  1627        Albumin         2.6       Anion Gap   11       8       Baso #   0.04             Basophil %   0.9             BUN   43       43       Calcium   8.3       8.5       Chloride   106       106       CO2   20       23       Creatinine   2.7       2.8       eGFR   25  Comment: Estimated GFR calculated using the CKD-EPI creatinine (2021) equation.       24  Comment: Estimated GFR calculated using the CKD-EPI creatinine (2021) equation.       Eos #   0.13             Eos %   3.0             Glucose   88       130       Gran # (ANC)   2.18             Hematocrit   29.8             Hemoglobin   8.6             Immature Grans (Abs)   0.01  Comment: Mild elevation in immature granulocytes is non specific and can be seen in a variety of conditions including stress response, acute inflammation, trauma and pregnancy. Correlation with other laboratory and clinical findings is essential.             Immature Granulocytes   0.2             Lymph #   1.09             Lymph %   25.1             Magnesium    1.9             MCH   22.3             MCHC   28.9             MCV   77             Mono #   0.90             Mono %   20.7             MPV     Comment: Result Not Available             Neut %   50.1             nRBC   0             Phosphorus Level   3.9       3.6       Platelet Count   115             POCT Glucose 160     115   148         Potassium   3.4       4.1       RBC   3.85             RDW   19.7             Sodium   137       137       Tacrolimus Lvl   4.6  Comment: Testing performed by a chemiluminescent microparticle   immunoassay on the [employer].    CAUTION:  No firm therapeutic range exists for tacrolimus in whole blood. The complexity of the clinical state, individual differences in sensitivity to immunosuppressive and nephrotoxic effects of tacrolimus, co-administration of other immunosuppressants, type of transplant, time post-transplant and a number of other factors contribute to different requirements for optimal blood levels of tacrolimus. Therefore, individual tacrolimus values cannot be used as the sole indicator for making changes in treatment regimen and each patient should be thoroughly evaluated clinically before changes in treatment regimens are made. Each user must establish his or her own ranges based on clinical experience.  Therapeutic ranges vary according to the commercial test used, and therefore should be established for each commercial test. Values obtained with different assay methods cannot be used interchangeably due to differences in assay methods and cross-reactivity with metabolites, nor should correction factors be applied. Therefore, consistent use of one assay for individual patients is recommended.               Troponin I High Sensitivity         78       WBC   4.35                                    Significant Imaging: I have reviewed all pertinent imaging results/findings within the past 24 hours.

## 2025-05-04 NOTE — ASSESSMENT & PLAN NOTE
"Patient has Diastolic (HFpEF) heart failure that is Chronic. On presentation their CHF was well compensated. Most recent BNP and echo results are listed below.  No results for input(s): "BNP" in the last 72 hours.  Latest ECHO  Results for orders placed during the hospital encounter of 02/20/25    Echo    Interpretation Summary    Left Ventricle: There is mild concentric hypertrophy. There is low normal systolic function with a visually estimated ejection fraction of 50 - 55%. Grade III diastolic dysfunction.    Left Ventricle: The left ventricle is at the upper limits of normal in size. Mildly increased wall thickness. There is mild concentric hypertrophy. Regional wall motion abnormalities present. See diagram for wall motion findings. There is low normal systolic function with a visually estimated ejection fraction of 50 - 55%. Ejection fraction is approximately 53%. Grade III diastolic dysfunction.    Right Ventricle: Systolic function is borderline low despite the low TAPSE.    Left Atrium: Left atrium is moderately dilated.    Aortic Valve: The aortic valve is a trileaflet valve. There is mild aortic valve sclerosis. There is moderate annular calcification present. There is mild aortic regurgitation with a centrally directed jet.    Mitral Valve: There is mild regurgitation with a centrally directed jet.    Pericardium: There is a trivial posterior effusion and under the RA.    Tricuspid Valve: There is mild regurgitation.    Pulmonary Artery: The estimated pulmonary artery systolic pressure is 30 mmHg.    Current Heart Failure Medications  hydrALAZINE tablet 100 mg, Every 8 hours, Oral  empagliflozin (Jardiance) tablet 10 mg, Daily, Oral  hydrALAZINE injection 15 mg, Every 6 hours PRN, Intravenous  torsemide tablet 40 mg, Daily, Oral  valsartan tablet 320 mg, Daily, Oral    Plan  - Monitor strict I&Os and daily weights.    - Place on telemetry  - Low sodium diet  - Cardiology has not been consulted  - The " patient's volume status is at their baseline

## 2025-05-04 NOTE — ASSESSMENT & PLAN NOTE
Patient has a diagnosis of pneumonia. The cause of the pneumonia is suspected to be bacterial in etiology but organism is not known. The pneumonia is stable. The patient has the following signs/symptoms of pneumonia: cough. The patient does not have a current oxygen requirement and the patient does not have a home oxygen requirement. I have reviewed the pertinent imaging. The following cultures have been collected: Blood cultures and Sputum culture The culture results are listed below.     Current antimicrobial regimen consists of the antibiotics listed below. Will monitor patient closely and continue current treatment plan unchanged.    -Given fevers/chills and recurrent pna, reasonable to continue below abx and deescalate when indicated  -f/u sputum, blood cx    Antibiotics (From admission, onward)      Start     Stop Route Frequency Ordered    05/02/25 1000  ceFEPIme injection 2 g         -- IV Every 12 hours (non-standard times) 05/02/25 0745    04/30/25 0900  azithromycin (ZITHROMAX) 500 mg in 0.9% NaCl 250 mL IVPB (admixture device)         05/01/25 1259 IV Every 24 hours (non-standard times) 04/29/25 1016          Microbiology Results (last 7 days)       Procedure Component Value Units Date/Time    Blood culture x two cultures. Draw prior to antibiotics. [[id number]]  (Normal) Collected: 04/29/25 0758    Order Status: Completed Specimen: Blood from Peripheral, Forearm, Left Updated: 05/03/25 1801     Blood Culture No Growth After 96 hours    Blood culture x two cultures. Draw prior to antibiotics. [[id number]]  (Normal) Collected: 04/29/25 0758    Order Status: Completed Specimen: Blood from Peripheral, Antecubital, Left Updated: 05/03/25 1801     Blood Culture No Growth After 96 hours    Respiratory Infection Panel (PCR), Nasopharyngeal [[id number]] Collected: 05/02/25 1027    Order Status: Completed Specimen: Nasopharyngeal Swab Updated: 05/02/25 1545     Respiratory Infection Panel Source Nasopharyngeal  Swab     Adenovirus Not Detected     Coronavirus 229E, Common Cold Virus Not Detected     Coronavirus HKU1, Common Cold Virus Not Detected     Coronavirus NL63, Common Cold Virus Not Detected     Coronavirus OC43, Common Cold Virus Not Detected     SARS-CoV2 (COVID-19) Qualitative PCR Not Detected     Human Metapneumovirus Not Detected     Human Rhinovirus/Enterovirus Not Detected     Influenza A Not Detected     Influenza B Not Detected     Parainfluenza Virus 1 Not Detected     Parainfluenza Virus 2 Not Detected     Parainfluenza Virus 3 Not Detected     Parainfluenza Virus 4 Not Detected     Respiratory Syncytial Virus Not Detected     Bordetella Parapertussis ([id number]) Not Detected     Bordetella pertussis (ptxP) Not Detected     Chlamydia pneumoniae Not Detected     Mycoplasma pneumoniae Not Detected    MRSA Screen by PCR [[id number]]     Order Status: Sent Specimen: Nasal Swab     Culture, Respiratory with Gram Stain [[id number]]     Order Status: Sent Specimen: Respiratory     Culture, Respiratory with Gram Stain [[id number]]     Order Status: Sent Specimen: Respiratory     Influenza A & B by Molecular [[id number]]  (Normal) Collected: 04/29/25 0749    Order Status: Completed Specimen: Nasal Swab Updated: 04/29/25 0836     INFLUENZA A MOLECULAR Negative     INFLUENZA B MOLECULAR  Negative          5/1  BC x2 4/29 NGTD. vancomycins discontinued.  respiratory cultures pending.  CT chest ordered -RML GGO concerning for pneumonia possible hiatal hernia and possible anterior vertebral osteophyte near mid esophagus.  SLP consulted  and consider esophogram to assess for hiatal hernia and recurrent aspiration as a cause of repeat pneumonia. continue cefepime. completed azithromycin. denies hemoptysis this admission. last episode 2 weeks back. resumed prograf 1mg BID. continue prednisone. hold cellcept for now     Patient has a diagnosis of pneumonia. The cause of the pneumonia is suspected to be bacterial in etiology  but organism is not known. The pneumonia is stable. The patient has the following signs/symptoms of pneumonia: cough. The patient does not have a current oxygen requirement and the patient does not have a home oxygen requirement. I have reviewed the pertinent imaging. The following cultures have been collected: Blood cultures and Sputum culture The culture results are listed below.     Current antimicrobial regimen consists of the antibiotics listed below. Will monitor patient closely and continue current treatment plan unchanged.    -Given fevers/chills and recurrent pna, reasonable to continue below abx and deescalate when indicated  -f/u sputum, blood cx    Antibiotics (From admission, onward)      Start     Stop Route Frequency Ordered    05/02/25 1000  ceFEPIme injection 2 g         -- IV Every 12 hours (non-standard times) 05/02/25 0745    04/30/25 0900  azithromycin (ZITHROMAX) 500 mg in 0.9% NaCl 250 mL IVPB (admixture device)         05/01/25 1259 IV Every 24 hours (non-standard times) 04/29/25 1016          Antibiotics (From admission, onward)      Start     Stop Route Frequency Ordered    05/02/25 1000  ceFEPIme injection 2 g         -- IV Every 12 hours (non-standard times) 05/02/25 0745    04/30/25 0900  azithromycin (ZITHROMAX) 500 mg in 0.9% NaCl 250 mL IVPB (admixture device)         05/01/25 1259 IV Every 24 hours (non-standard times) 04/29/25 1016          5/1  PMH of DM, renal transplant 2016, CHFpEF, AF/AFL (PVI/PWI/CTI 6/2024), CVA, recurrent PNA who presents with fevers, chills.  States that symptoms are similar to last month when he was admitted for pneumonia. No other pna symptoms despite CXR changes.BC x2 4/29 NGTD. vancomycins discontinued.  respiratory cultures pending.  CT chest ordered -RML GGO concerning for pneumonia possible hiatal hernia and possible anterior vertebral osteophyte near mid esophagus.  SLP consulted  and  barium esophogram to assess for hiatal hernia (second episode of  pneumonia since March 2025) and recurrent aspiration as a cause of repeat pneumonia. continue cefepime. completed azithromycin. denies hemoptysis this admission. last episode 2 weeks back. resumed prograf 1mg BID. continue prednisone. hold cellcept for now     Patient has a diagnosis of pneumonia. The cause of the pneumonia is suspected to be bacterial in etiology but organism is not known. The pneumonia is stable. The patient has the following signs/symptoms of pneumonia: cough. The patient does not have a current oxygen requirement and the patient does not have a home oxygen requirement. I have reviewed the pertinent imaging. The following cultures have been collected: Blood cultures and Sputum culture The culture results are listed below.     Current antimicrobial regimen consists of the antibiotics listed below. Will monitor patient closely and continue current treatment plan unchanged.    -Given fevers/chills and recurrent pna, reasonable to continue below abx and deescalate when indicated  -f/u sputum, blood cx    Antibiotics (From admission, onward)      Start     Stop Route Frequency Ordered    05/02/25 1000  ceFEPIme injection 2 g         -- IV Every 12 hours (non-standard times) 05/02/25 0745    04/30/25 0900  azithromycin (ZITHROMAX) 500 mg in 0.9% NaCl 250 mL IVPB (admixture device)         05/01/25 1259 IV Every 24 hours (non-standard times) 04/29/25 1016          Microbiology Results (last 7 days)       Procedure Component Value Units Date/Time    Blood culture x two cultures. Draw prior to antibiotics. [[id number]]  (Normal) Collected: 04/29/25 0758    Order Status: Completed Specimen: Blood from Peripheral, Forearm, Left Updated: 05/03/25 1801     Blood Culture No Growth After 96 hours    Blood culture x two cultures. Draw prior to antibiotics. [[id number]]  (Normal) Collected: 04/29/25 0758    Order Status: Completed Specimen: Blood from Peripheral, Antecubital, Left Updated: 05/03/25 1801     Blood  Culture No Growth After 96 hours    Respiratory Infection Panel (PCR), Nasopharyngeal [[id number]] Collected: 05/02/25 1027    Order Status: Completed Specimen: Nasopharyngeal Swab Updated: 05/02/25 1545     Respiratory Infection Panel Source Nasopharyngeal Swab     Adenovirus Not Detected     Coronavirus 229E, Common Cold Virus Not Detected     Coronavirus HKU1, Common Cold Virus Not Detected     Coronavirus NL63, Common Cold Virus Not Detected     Coronavirus OC43, Common Cold Virus Not Detected     SARS-CoV2 (COVID-19) Qualitative PCR Not Detected     Human Metapneumovirus Not Detected     Human Rhinovirus/Enterovirus Not Detected     Influenza A Not Detected     Influenza B Not Detected     Parainfluenza Virus 1 Not Detected     Parainfluenza Virus 2 Not Detected     Parainfluenza Virus 3 Not Detected     Parainfluenza Virus 4 Not Detected     Respiratory Syncytial Virus Not Detected     Bordetella Parapertussis ([id number]) Not Detected     Bordetella pertussis (ptxP) Not Detected     Chlamydia pneumoniae Not Detected     Mycoplasma pneumoniae Not Detected    MRSA Screen by PCR [[id number]]     Order Status: Sent Specimen: Nasal Swab     Culture, Respiratory with Gram Stain [[id number]]     Order Status: Sent Specimen: Respiratory     Culture, Respiratory with Gram Stain [[id number]]     Order Status: Sent Specimen: Respiratory     Influenza A & B by Molecular [[id number]]  (Normal) Collected: 04/29/25 0749    Order Status: Completed Specimen: Nasal Swab Updated: 04/29/25 0836     INFLUENZA A MOLECULAR Negative     INFLUENZA B MOLECULAR  Negative          5/1  BC x2 4/29 NGTD. vancomycins discontinued.  respiratory cultures pending.  CT chest ordered -RML GGO concerning for pneumonia possible hiatal hernia and possible anterior vertebral osteophyte near mid esophagus.  SLP consulted  and consider esophogram to assess for hiatal hernia and recurrent aspiration as a cause of repeat pneumonia. continue cefepime.  completed azithromycin. denies hemoptysis this admission. last episode 2 weeks back. resumed prograf 1mg BID. continue prednisone. hold cellcept for now     Patient has a diagnosis of pneumonia. The cause of the pneumonia is suspected to be bacterial in etiology but organism is not known. The pneumonia is stable. The patient has the following signs/symptoms of pneumonia: cough. The patient does not have a current oxygen requirement and the patient does not have a home oxygen requirement. I have reviewed the pertinent imaging. The following cultures have been collected: Blood cultures and Sputum culture The culture results are listed below.     Current antimicrobial regimen consists of the antibiotics listed below. Will monitor patient closely and continue current treatment plan unchanged.    -Given fevers/chills and recurrent pna, reasonable to continue below abx and deescalate when indicated  -f/u sputum, blood cx    Antibiotics (From admission, onward)      Start     Stop Route Frequency Ordered    05/02/25 1000  ceFEPIme injection 2 g         -- IV Every 12 hours (non-standard times) 05/02/25 0745    04/30/25 0900  azithromycin (ZITHROMAX) 500 mg in 0.9% NaCl 250 mL IVPB (admixture device)         05/01/25 1259 IV Every 24 hours (non-standard times) 04/29/25 1016          Antibiotics (From admission, onward)      Start     Stop Route Frequency Ordered    05/02/25 1000  ceFEPIme injection 2 g         -- IV Every 12 hours (non-standard times) 05/02/25 0745    04/30/25 0900  azithromycin (ZITHROMAX) 500 mg in 0.9% NaCl 250 mL IVPB (admixture device)         05/01/25 1259 IV Every 24 hours (non-standard times) 04/29/25 1016            Patient has a diagnosis of pneumonia. The cause of the pneumonia is suspected to be bacterial in etiology but organism is not known. The pneumonia is stable. The patient has the following signs/symptoms of pneumonia: cough. The patient does not have a current oxygen requirement and the  patient does not have a home oxygen requirement. I have reviewed the pertinent imaging. The following cultures have been collected: Blood cultures and Sputum culture The culture results are listed below.     Current antimicrobial regimen consists of the antibiotics listed below. Will monitor patient closely and continue current treatment plan unchanged.    -Given fevers/chills and recurrent pna, reasonable to continue below abx and deescalate when indicated  -f/u sputum, blood cx    Antibiotics (From admission, onward)      Start     Stop Route Frequency Ordered    05/02/25 1000  ceFEPIme injection 2 g         -- IV Every 12 hours (non-standard times) 05/02/25 0745    04/30/25 0900  azithromycin (ZITHROMAX) 500 mg in 0.9% NaCl 250 mL IVPB (admixture device)         05/01/25 1259 IV Every 24 hours (non-standard times) 04/29/25 1016          Microbiology Results (last 7 days)       Procedure Component Value Units Date/Time    Blood culture x two cultures. Draw prior to antibiotics. [[id number]]  (Normal) Collected: 04/29/25 0758    Order Status: Completed Specimen: Blood from Peripheral, Forearm, Left Updated: 05/03/25 1801     Blood Culture No Growth After 96 hours    Blood culture x two cultures. Draw prior to antibiotics. [[id number]]  (Normal) Collected: 04/29/25 0758    Order Status: Completed Specimen: Blood from Peripheral, Antecubital, Left Updated: 05/03/25 1801     Blood Culture No Growth After 96 hours    Respiratory Infection Panel (PCR), Nasopharyngeal [[id number]] Collected: 05/02/25 1027    Order Status: Completed Specimen: Nasopharyngeal Swab Updated: 05/02/25 1545     Respiratory Infection Panel Source Nasopharyngeal Swab     Adenovirus Not Detected     Coronavirus 229E, Common Cold Virus Not Detected     Coronavirus HKU1, Common Cold Virus Not Detected     Coronavirus NL63, Common Cold Virus Not Detected     Coronavirus OC43, Common Cold Virus Not Detected     SARS-CoV2 (COVID-19) Qualitative PCR Not  Detected     Human Metapneumovirus Not Detected     Human Rhinovirus/Enterovirus Not Detected     Influenza A Not Detected     Influenza B Not Detected     Parainfluenza Virus 1 Not Detected     Parainfluenza Virus 2 Not Detected     Parainfluenza Virus 3 Not Detected     Parainfluenza Virus 4 Not Detected     Respiratory Syncytial Virus Not Detected     Bordetella Parapertussis ([id number]) Not Detected     Bordetella pertussis (ptxP) Not Detected     Chlamydia pneumoniae Not Detected     Mycoplasma pneumoniae Not Detected    MRSA Screen by PCR [[id number]]     Order Status: Sent Specimen: Nasal Swab     Culture, Respiratory with Gram Stain [[id number]]     Order Status: Sent Specimen: Respiratory     Culture, Respiratory with Gram Stain [[id number]]     Order Status: Sent Specimen: Respiratory     Influenza A & B by Molecular [[id number]]  (Normal) Collected: 04/29/25 0749    Order Status: Completed Specimen: Nasal Swab Updated: 04/29/25 0836     INFLUENZA A MOLECULAR Negative     INFLUENZA B MOLECULAR  Negative          5/1  BC x2 4/29 NGTD. vancomycins discontinued.  respiratory cultures pending.  CT chest ordered -RML GGO concerning for pneumonia possible hiatal hernia and possible anterior vertebral osteophyte near mid esophagus.  SLP consulted  and consider esophogram to assess for hiatal hernia and recurrent aspiration as a cause of repeat pneumonia. continue cefepime. completed azithromycin. denies hemoptysis this admission. last episode 2 weeks back. resumed prograf 1mg BID. continue prednisone. hold cellcept for now     Patient has a diagnosis of pneumonia. The cause of the pneumonia is suspected to be bacterial in etiology but organism is not known. The pneumonia is stable. The patient has the following signs/symptoms of pneumonia: cough. The patient does not have a current oxygen requirement and the patient does not have a home oxygen requirement. I have reviewed the pertinent imaging. The following  cultures have been collected: Blood cultures and Sputum culture The culture results are listed below.     Current antimicrobial regimen consists of the antibiotics listed below. Will monitor patient closely and continue current treatment plan unchanged.    -Given fevers/chills and recurrent pna, reasonable to continue below abx and deescalate when indicated  -f/u sputum, blood cx    Antibiotics (From admission, onward)      Start     Stop Route Frequency Ordered    05/02/25 1000  ceFEPIme injection 2 g         -- IV Every 12 hours (non-standard times) 05/02/25 0745    04/30/25 0900  azithromycin (ZITHROMAX) 500 mg in 0.9% NaCl 250 mL IVPB (admixture device)         05/01/25 1259 IV Every 24 hours (non-standard times) 04/29/25 1016          Antibiotics (From admission, onward)      Start     Stop Route Frequency Ordered    05/02/25 1000  ceFEPIme injection 2 g         -- IV Every 12 hours (non-standard times) 05/02/25 0745    04/30/25 0900  azithromycin (ZITHROMAX) 500 mg in 0.9% NaCl 250 mL IVPB (admixture device)         05/01/25 1259 IV Every 24 hours (non-standard times) 04/29/25 1016          5/1  PMH of DM, renal transplant 2016, CHFpEF, AF/AFL (PVI/PWI/CTI 6/2024), CVA, recurrent PNA who presents with fevers, chills.  States that symptoms are similar to last month when he was admitted for pneumonia. No other pna symptoms despite CXR changes.BC x2 4/29 NGTD. vancomycins discontinued.  respiratory cultures pending.  CT chest ordered -L GGO concerning for pneumonia possible hiatal hernia and possible anterior vertebral osteophyte near mid esophagus.  SLP consulted  and  barium esophogram to assess for hiatal hernia (second episode of pneumonia since March 2025) and recurrent aspiration as a cause of repeat pneumonia. continue cefepime. completed azithromycin. denies hemoptysis this admission. last episode 2 weeks back. resumed prograf 1mg BID. continue prednisone. hold cellcept for now     Patient has a  diagnosis of pneumonia. The cause of the pneumonia is suspected to be bacterial in etiology but organism is not known. The pneumonia is stable. The patient has the following signs/symptoms of pneumonia: cough. The patient does not have a current oxygen requirement and the patient does not have a home oxygen requirement. I have reviewed the pertinent imaging. The following cultures have been collected: Blood cultures and Sputum culture The culture results are listed below.     Current antimicrobial regimen consists of the antibiotics listed below. Will monitor patient closely and continue current treatment plan unchanged.    -Given fevers/chills and recurrent pna, reasonable to continue below abx and deescalate when indicated  -f/u sputum, blood cx    Antibiotics (From admission, onward)      Start     Stop Route Frequency Ordered    05/02/25 1000  ceFEPIme injection 2 g         -- IV Every 12 hours (non-standard times) 05/02/25 0745    04/30/25 0900  azithromycin (ZITHROMAX) 500 mg in 0.9% NaCl 250 mL IVPB (admixture device)         05/01/25 1259 IV Every 24 hours (non-standard times) 04/29/25 1016          Microbiology Results (last 7 days)       Procedure Component Value Units Date/Time    Blood culture x two cultures. Draw prior to antibiotics. [[id number]]  (Normal) Collected: 04/29/25 0758    Order Status: Completed Specimen: Blood from Peripheral, Forearm, Left Updated: 05/03/25 1801     Blood Culture No Growth After 96 hours    Blood culture x two cultures. Draw prior to antibiotics. [[id number]]  (Normal) Collected: 04/29/25 0758    Order Status: Completed Specimen: Blood from Peripheral, Antecubital, Left Updated: 05/03/25 1801     Blood Culture No Growth After 96 hours    Respiratory Infection Panel (PCR), Nasopharyngeal [[id number]] Collected: 05/02/25 1027    Order Status: Completed Specimen: Nasopharyngeal Swab Updated: 05/02/25 1545     Respiratory Infection Panel Source Nasopharyngeal Swab      Adenovirus Not Detected     Coronavirus 229E, Common Cold Virus Not Detected     Coronavirus HKU1, Common Cold Virus Not Detected     Coronavirus NL63, Common Cold Virus Not Detected     Coronavirus OC43, Common Cold Virus Not Detected     SARS-CoV2 (COVID-19) Qualitative PCR Not Detected     Human Metapneumovirus Not Detected     Human Rhinovirus/Enterovirus Not Detected     Influenza A Not Detected     Influenza B Not Detected     Parainfluenza Virus 1 Not Detected     Parainfluenza Virus 2 Not Detected     Parainfluenza Virus 3 Not Detected     Parainfluenza Virus 4 Not Detected     Respiratory Syncytial Virus Not Detected     Bordetella Parapertussis ([id number]) Not Detected     Bordetella pertussis (ptxP) Not Detected     Chlamydia pneumoniae Not Detected     Mycoplasma pneumoniae Not Detected    MRSA Screen by PCR [[id number]]     Order Status: Sent Specimen: Nasal Swab     Culture, Respiratory with Gram Stain [[id number]]     Order Status: Sent Specimen: Respiratory     Culture, Respiratory with Gram Stain [[id number]]     Order Status: Sent Specimen: Respiratory     Influenza A & B by Molecular [[id number]]  (Normal) Collected: 04/29/25 0749    Order Status: Completed Specimen: Nasal Swab Updated: 04/29/25 0836     INFLUENZA A MOLECULAR Negative     INFLUENZA B MOLECULAR  Negative          5/1  BC x2 4/29 NGTD. vancomycins discontinued.  respiratory cultures pending.  CT chest ordered -RML GGO concerning for pneumonia possible hiatal hernia and possible anterior vertebral osteophyte near mid esophagus.  SLP consulted  and consider esophogram to assess for hiatal hernia and recurrent aspiration as a cause of repeat pneumonia. continue cefepime. completed azithromycin. denies hemoptysis this admission. last episode 2 weeks back. resumed prograf 1mg BID. continue prednisone. hold cellcept for now     Patient has a diagnosis of pneumonia. The cause of the pneumonia is suspected to be bacterial in etiology but  organism is not known. The pneumonia is stable. The patient has the following signs/symptoms of pneumonia: cough. The patient does not have a current oxygen requirement and the patient does not have a home oxygen requirement. I have reviewed the pertinent imaging. The following cultures have been collected: Blood cultures and Sputum culture The culture results are listed below.     Current antimicrobial regimen consists of the antibiotics listed below. Will monitor patient closely and continue current treatment plan unchanged.    -Given fevers/chills and recurrent pna, reasonable to continue below abx and deescalate when indicated  -f/u sputum, blood cx    Antibiotics (From admission, onward)      Start     Stop Route Frequency Ordered    05/02/25 1000  ceFEPIme injection 2 g         -- IV Every 12 hours (non-standard times) 05/02/25 0745    04/30/25 0900  azithromycin (ZITHROMAX) 500 mg in 0.9% NaCl 250 mL IVPB (admixture device)         05/01/25 1259 IV Every 24 hours (non-standard times) 04/29/25 1016          Antibiotics (From admission, onward)      Start     Stop Route Frequency Ordered    05/02/25 1000  ceFEPIme injection 2 g         -- IV Every 12 hours (non-standard times) 05/02/25 0745    04/30/25 0900  azithromycin (ZITHROMAX) 500 mg in 0.9% NaCl 250 mL IVPB (admixture device)         05/01/25 1259 IV Every 24 hours (non-standard times) 04/29/25 1016

## 2025-05-04 NOTE — ASSESSMENT & PLAN NOTE
Anemia is likely due to chronic disease due to Chronic Kidney Disease. Most recent hemoglobin and hematocrit are listed below.  Recent Labs     05/05/25  0513 05/06/25  0644 05/07/25  0328   HGB 9.0* 8.6* 9.1*   HCT 31.5* 29.6* 31.1*     Plan  - Monitor serial CBC: Daily  - Transfuse PRBC if patient becomes hemodynamically unstable, symptomatic or H/H drops below 7/21.  - Patient has not received any PRBC transfusions to date  - Patient's anemia is currently stable

## 2025-05-04 NOTE — ASSESSMENT & PLAN NOTE
Patient has persistent (7 days or more) atrial fibrillation. Patient is currently in atrial fibrillation. [id number] Score: 3. The patients heart rate in the last 24 hours is as follows:  Pulse  Min: 75  Max: 88     Antiarrhythmics       Anticoagulants  rivaroxaban tablet 15 mg, With dinner, Oral    Plan  - Replete lytes with a goal of K>4, Mg >2  - Patient is anticoagulated, see medications listed above.  - Patient's afib is currently controlled

## 2025-05-04 NOTE — ASSESSMENT & PLAN NOTE
Patient's most recent potassium results are listed below.   Recent Labs     05/03/25  0237 05/03/25  1627 05/04/25  0233   K 3.2* 4.1 3.4*     Plan  - Replete potassium per protocol  - Monitor potassium Daily  - Patient's hypokalemia is stable

## 2025-05-04 NOTE — ASSESSMENT & PLAN NOTE
Creatine stable for now. BMP reviewed- noted Estimated Creatinine Clearance: 23.8 mL/min (A) (based on SCr of 3.4 mg/dL (H)). according to latest data. Based on current GFR, CKD stage is stage 3 - GFR 30-59.  Monitor UOP and serial BMP and adjust therapy as needed. Renally dose meds. Avoid nephrotoxic medications and procedures.

## 2025-05-05 ENCOUNTER — PATIENT MESSAGE (OUTPATIENT)
Dept: ELECTROPHYSIOLOGY | Facility: CLINIC | Age: 70
End: 2025-05-05
Payer: MEDICARE

## 2025-05-05 LAB
ABSOLUTE EOSINOPHIL (OHS): 0.22 K/UL
ABSOLUTE MONOCYTE (OHS): 1.18 K/UL (ref 0.3–1)
ABSOLUTE NEUTROPHIL COUNT (OHS): 2.25 K/UL (ref 1.8–7.7)
ANION GAP (OHS): 10 MMOL/L (ref 8–16)
BASOPHILS # BLD AUTO: 0.05 K/UL
BASOPHILS NFR BLD AUTO: 1 %
BUN SERPL-MCNC: 46 MG/DL (ref 8–23)
CALCIUM SERPL-MCNC: 8.7 MG/DL (ref 8.7–10.5)
CHLORIDE SERPL-SCNC: 106 MMOL/L (ref 95–110)
CO2 SERPL-SCNC: 22 MMOL/L (ref 23–29)
CREAT SERPL-MCNC: 2.8 MG/DL (ref 0.5–1.4)
ERYTHROCYTE [DISTWIDTH] IN BLOOD BY AUTOMATED COUNT: 19.7 % (ref 11.5–14.5)
GFR SERPLBLD CREATININE-BSD FMLA CKD-EPI: 24 ML/MIN/1.73/M2
GLUCOSE SERPL-MCNC: 150 MG/DL (ref 70–110)
HCT VFR BLD AUTO: 31.5 % (ref 40–54)
HGB BLD-MCNC: 9 GM/DL (ref 14–18)
IMM GRANULOCYTES # BLD AUTO: 0.02 K/UL (ref 0–0.04)
IMM GRANULOCYTES NFR BLD AUTO: 0.4 % (ref 0–0.5)
LYMPHOCYTES # BLD AUTO: 1.33 K/UL (ref 1–4.8)
MAGNESIUM SERPL-MCNC: 1.9 MG/DL (ref 1.6–2.6)
MCH RBC QN AUTO: 22.2 PG (ref 27–31)
MCHC RBC AUTO-ENTMCNC: 28.6 G/DL (ref 32–36)
MCV RBC AUTO: 78 FL (ref 82–98)
NUCLEATED RBC (/100WBC) (OHS): 0 /100 WBC
PHOSPHATE SERPL-MCNC: 4.1 MG/DL (ref 2.7–4.5)
PLATELET # BLD AUTO: 134 K/UL (ref 150–450)
PLATELET BLD QL SMEAR: ABNORMAL
PMV BLD AUTO: ABNORMAL FL
POCT GLUCOSE: 161 MG/DL (ref 70–110)
POCT GLUCOSE: 221 MG/DL (ref 70–110)
POCT GLUCOSE: 234 MG/DL (ref 70–110)
POTASSIUM SERPL-SCNC: 3.9 MMOL/L (ref 3.5–5.1)
RBC # BLD AUTO: 4.05 M/UL (ref 4.6–6.2)
RELATIVE EOSINOPHIL (OHS): 4.4 %
RELATIVE LYMPHOCYTE (OHS): 26.3 % (ref 18–48)
RELATIVE MONOCYTE (OHS): 23.4 % (ref 4–15)
RELATIVE NEUTROPHIL (OHS): 44.5 % (ref 38–73)
SODIUM SERPL-SCNC: 138 MMOL/L (ref 136–145)
TACROLIMUS BLD-MCNC: 4.8 NG/ML (ref 5–15)
W LEGIONELLA URINARY ANTIGEN: NEGATIVE
WBC # BLD AUTO: 5.05 K/UL (ref 3.9–12.7)

## 2025-05-05 PROCEDURE — 63600175 PHARM REV CODE 636 W HCPCS: Mod: HCNC | Performed by: HOSPITALIST

## 2025-05-05 PROCEDURE — 99233 SBSQ HOSP IP/OBS HIGH 50: CPT | Mod: HCNC,GC,, | Performed by: STUDENT IN AN ORGANIZED HEALTH CARE EDUCATION/TRAINING PROGRAM

## 2025-05-05 PROCEDURE — 80048 BASIC METABOLIC PNL TOTAL CA: CPT | Mod: HCNC | Performed by: STUDENT IN AN ORGANIZED HEALTH CARE EDUCATION/TRAINING PROGRAM

## 2025-05-05 PROCEDURE — 63600175 PHARM REV CODE 636 W HCPCS: Mod: HCNC | Performed by: INTERNAL MEDICINE

## 2025-05-05 PROCEDURE — 25000003 PHARM REV CODE 250: Mod: HCNC

## 2025-05-05 PROCEDURE — 36415 COLL VENOUS BLD VENIPUNCTURE: CPT | Mod: HCNC | Performed by: STUDENT IN AN ORGANIZED HEALTH CARE EDUCATION/TRAINING PROGRAM

## 2025-05-05 PROCEDURE — 80197 ASSAY OF TACROLIMUS: CPT | Mod: HCNC | Performed by: STUDENT IN AN ORGANIZED HEALTH CARE EDUCATION/TRAINING PROGRAM

## 2025-05-05 PROCEDURE — 83735 ASSAY OF MAGNESIUM: CPT | Mod: HCNC | Performed by: STUDENT IN AN ORGANIZED HEALTH CARE EDUCATION/TRAINING PROGRAM

## 2025-05-05 PROCEDURE — 84100 ASSAY OF PHOSPHORUS: CPT | Mod: HCNC | Performed by: STUDENT IN AN ORGANIZED HEALTH CARE EDUCATION/TRAINING PROGRAM

## 2025-05-05 PROCEDURE — 25000003 PHARM REV CODE 250: Mod: HCNC | Performed by: STUDENT IN AN ORGANIZED HEALTH CARE EDUCATION/TRAINING PROGRAM

## 2025-05-05 PROCEDURE — 63600175 PHARM REV CODE 636 W HCPCS: Mod: HCNC

## 2025-05-05 PROCEDURE — 63600175 PHARM REV CODE 636 W HCPCS: Mod: HCNC | Performed by: STUDENT IN AN ORGANIZED HEALTH CARE EDUCATION/TRAINING PROGRAM

## 2025-05-05 PROCEDURE — 85025 COMPLETE CBC W/AUTO DIFF WBC: CPT | Mod: HCNC | Performed by: STUDENT IN AN ORGANIZED HEALTH CARE EDUCATION/TRAINING PROGRAM

## 2025-05-05 PROCEDURE — 21400001 HC TELEMETRY ROOM: Mod: HCNC

## 2025-05-05 PROCEDURE — 25000003 PHARM REV CODE 250: Mod: HCNC | Performed by: HOSPITALIST

## 2025-05-05 RX ORDER — EPLERENONE 25 MG/1
50 TABLET ORAL DAILY
Status: DISCONTINUED | OUTPATIENT
Start: 2025-05-05 | End: 2025-05-08 | Stop reason: HOSPADM

## 2025-05-05 RX ORDER — INSULIN GLARGINE 100 [IU]/ML
15 INJECTION, SOLUTION SUBCUTANEOUS EVERY MORNING
Qty: 15 ML | Refills: 6 | Status: SHIPPED | OUTPATIENT
Start: 2025-05-05 | End: 2025-05-07 | Stop reason: HOSPADM

## 2025-05-05 RX ORDER — TORSEMIDE 20 MG/1
40 TABLET ORAL DAILY
Qty: 120 TABLET | Refills: 2 | Status: SHIPPED | OUTPATIENT
Start: 2025-05-05 | End: 2025-05-07 | Stop reason: HOSPADM

## 2025-05-05 RX ORDER — GABAPENTIN 300 MG/1
300 CAPSULE ORAL 2 TIMES DAILY
Qty: 60 CAPSULE | Refills: 11 | Status: SHIPPED | OUTPATIENT
Start: 2025-05-05 | End: 2026-05-05

## 2025-05-05 RX ORDER — CLONIDINE 0.1 MG/24H
1 PATCH, EXTENDED RELEASE TRANSDERMAL
Qty: 4 PATCH | Refills: 11 | Status: SHIPPED | OUTPATIENT
Start: 2025-05-05 | End: 2026-05-05

## 2025-05-05 RX ORDER — DOXAZOSIN 8 MG/1
8 TABLET ORAL DAILY
Qty: 30 TABLET | Refills: 11 | Status: SHIPPED | OUTPATIENT
Start: 2025-05-05 | End: 2026-05-05

## 2025-05-05 RX ORDER — CLONIDINE 0.1 MG/24H
1 PATCH, EXTENDED RELEASE TRANSDERMAL
Status: DISCONTINUED | OUTPATIENT
Start: 2025-05-05 | End: 2025-05-08 | Stop reason: HOSPADM

## 2025-05-05 RX ORDER — CEFEPIME HYDROCHLORIDE 2 G/1
2 INJECTION, POWDER, FOR SOLUTION INTRAVENOUS
Status: COMPLETED | OUTPATIENT
Start: 2025-05-05 | End: 2025-05-05

## 2025-05-05 RX ADMIN — HYDRALAZINE HYDROCHLORIDE 100 MG: 50 TABLET ORAL at 06:05

## 2025-05-05 RX ADMIN — EPLERENONE 50 MG: 25 TABLET, FILM COATED ORAL at 05:05

## 2025-05-05 RX ADMIN — GABAPENTIN 300 MG: 300 CAPSULE ORAL at 09:05

## 2025-05-05 RX ADMIN — TACROLIMUS 1 MG: 1 CAPSULE ORAL at 07:05

## 2025-05-05 RX ADMIN — ATORVASTATIN CALCIUM 80 MG: 40 TABLET, FILM COATED ORAL at 09:05

## 2025-05-05 RX ADMIN — CLONIDINE HYDROCHLORIDE 0.1 MG: 0.1 TABLET ORAL at 09:05

## 2025-05-05 RX ADMIN — CLONIDINE 1 PATCH: 0.1 PATCH TRANSDERMAL at 09:05

## 2025-05-05 RX ADMIN — HYDRALAZINE HYDROCHLORIDE 100 MG: 50 TABLET ORAL at 09:05

## 2025-05-05 RX ADMIN — Medication 16 G: at 10:05

## 2025-05-05 RX ADMIN — HYDRALAZINE HYDROCHLORIDE 100 MG: 50 TABLET ORAL at 02:05

## 2025-05-05 RX ADMIN — TORSEMIDE 40 MG: 20 TABLET ORAL at 09:05

## 2025-05-05 RX ADMIN — INSULIN ASPART 4 UNITS: 100 INJECTION, SOLUTION INTRAVENOUS; SUBCUTANEOUS at 02:05

## 2025-05-05 RX ADMIN — INSULIN ASPART 2 UNITS: 100 INJECTION, SOLUTION INTRAVENOUS; SUBCUTANEOUS at 10:05

## 2025-05-05 RX ADMIN — CEFEPIME 2 G: 2 INJECTION, POWDER, FOR SOLUTION INTRAVENOUS at 07:05

## 2025-05-05 RX ADMIN — DOXAZOSIN 8 MG: 4 TABLET ORAL at 09:05

## 2025-05-05 RX ADMIN — RIVAROXABAN 15 MG: 15 TABLET, FILM COATED ORAL at 05:05

## 2025-05-05 RX ADMIN — ACETAMINOPHEN 1000 MG: 500 TABLET ORAL at 09:05

## 2025-05-05 RX ADMIN — CEFEPIME 2 G: 2 INJECTION, POWDER, FOR SOLUTION INTRAVENOUS at 09:05

## 2025-05-05 RX ADMIN — GABAPENTIN 300 MG: 300 CAPSULE ORAL at 02:05

## 2025-05-05 RX ADMIN — TACROLIMUS 1 MG: 1 CAPSULE ORAL at 05:05

## 2025-05-05 RX ADMIN — VALSARTAN 320 MG: 160 TABLET, FILM COATED ORAL at 09:05

## 2025-05-05 RX ADMIN — Medication 1 CAPSULE: at 09:05

## 2025-05-05 RX ADMIN — INSULIN GLARGINE 15 UNITS: 100 INJECTION, SOLUTION SUBCUTANEOUS at 10:05

## 2025-05-05 RX ADMIN — EMPAGLIFLOZIN 10 MG: 10 TABLET, FILM COATED ORAL at 09:05

## 2025-05-05 RX ADMIN — PREDNISONE 5 MG: 5 TABLET ORAL at 09:05

## 2025-05-05 NOTE — PROGRESS NOTES
[facility] Medicine  Progress Note    Patient Name: Ravi Zamorano  MRN: [id number]  Patient Class: IP- Inpatient   Admission Date: 4/29/2025  Length of Stay: 6 days  Attending Physician: Cliff Guaman MD  Primary Care Provider: Mima Mack MD        Subjective     Principal Problem:Community acquired bacterial pneumonia        HPI:  This is a 69-year-old male with a history of DM, renal transplant 2016, CHFpEF, AF/AFL (PVI/PWI/CTI 6/2024), CVA, recurrent PNA who presents with fevers, chills.  States that symptoms are similar to last month when he was admitted for pneumonia.  Was in his usual state of health until last night when he started experiencing intermittent fevers, chills.  Wife states she also noticed that he has had increased intermittent cough over the past day or so.  Patient did not notice this.  Denies any production.  Also denies any shortness for breath, wheezing, chest pain, abdominal pain, diarrhea, nausea, vomiting, dysuria.  Compliant with all medications.  Trace lower extremity swelling is chronic.    ED course:  On room air.  Lab work significant for Cr 2.7, same as previous. CXR showed development of RLL consolidation.  Received IV vancomycin, azithromycin, Zosyn.  Septic workup obtained.    Overview/Hospital Course:  Patient admitted due to fever, fatigue and chills at home. He has not had any cough or sob at home. He was recently treated for PNA at the end of march and was feeling a lot better since then, until he had a sudden onset of symptoms. He did describe some productive cough for several morning and the mucus he coughed op had rust colored blood as well as bright red blood sometimes. Theses episodes only happened shortly after wakening in the morning and didn't persist during the day, no associated sob or other symptoms during those times. Denies nasal drainage, congestion, or nose bleeds.       Started on broad spectrum and feeling a lot better.  Still on RA and no cough, wheezes or sob     CT chest ordered to get a better look at the consolidations and possible hemoptysis, will considered pulmonology and/or ID consulted to help direct care    5/1  PMH of DM, renal transplant 2016, CHFpEF, AF/AFL (PVI/PWI/CTI 6/2024), CVA, recurrent PNA who presents with fevers, chills.  States that symptoms are similar to last month when he was admitted for pneumonia. No other pna symptoms despite CXR changes.BC x2 4/29 NGTD. vancomycins discontinued.  respiratory cultures pending.  CT chest ordered -L GGO concerning for pneumonia possible hiatal hernia and possible anterior vertebral osteophyte near mid esophagus.  SLP consulted. MBSS and barium esophogram to assess for hiatal hernia (second episode of pneumonia since March 2025) and recurrent aspiration as a cause of repeat pneumonia. continue cefepime. completed azithromycin. denies hemoptysis this admission. last episode 2 weeks back. resumed prograf 1mg BID. continue prednisone. hold cellcept for now   5/2 s/p FEES. SLP recs regular diet/thin liquids CT chest -Bilateral multilobar consolidations and ground-glass opacities associated with bronchial thickening suggestive of infectious/inflammatory process. Right moderate and left small pleural effusions.  ID consulted for recurrent pneumonia/ immunosuppressed/ hemoptysis.  SBP 160s-180s. coreg previously due to bradycardia during hospitalization 2/202. discontinued Spironolactone 25 mg daily -  3/2024 due to getting LH and dizzy . procardia listed as allergy. Doxazosin increased to 8mg daiy. . HCTZ discontinued. started on torsemide 40mg daily. ID eval  -  resp infection panel and urine legionella  MRSA nares, resp culture  5/3 SBP 200s overnight. started on IV hydralzine. received IV hydralazine 15mg x 2 doses. BP  132/67 . K replaced. RIP negative. EKG -Atrial fibrillation with premature ventricular or aberrantly conducted complexe. Low voltage QRS. KTM f/u - Cr  elevated from his baseline - suspicion for failing allograft and approaching need for dialysis .  hold valsartan for now. monitor BP  5/4 K replaced. SBP 200s overnight. received hydralazine 15mg x 1 overnight. discussed with KTM. Cr stable. resume valsartan. Clonidine 0.1 mg PRN for SBP > 170mm HG. continue  cefepime for 7 day course (end date 5/5/25)  5/5 complete cefepime today. CR stable. SBP 180s overnight. started on clonidine patch.  per radiology, small hiatal hernia visible on their CT chest 5/1/25. reports left LE pain with ambulation. WENDI ordered       Review of Systems:   Pain scale:   Constitutional:  fever,  chills, headache, vision loss, hearing loss, weight loss, Generalized weakness, falls, loss of smell, loss of taste, poor appetite,  sore throat  Respiratory: cough, shortness of breath.   Cardiovascular: chest pain, dizziness, palpitations, orthopnea, swelling of feet, syncope  Gastrointestinal: nausea, vomiting, abdominal pain, diarrhea, black stool,  blood in stool, change in bowel habits, constipation  Genitourinary: hematuria, dysuria, urgency, frequency  Integument/Breast: rash,  pruritis  Hematologic/Lymphatic: easy bruising, lymphadenopathy  Musculoskeletal: arthralgias , myalgias, back pain, neck pain, knee pain  Neurological: confusion, seizures, tremors, slurred speech  Behavioral/Psych:  depression, anxiety, auditory or visual hallucinations     OBJECTIVE:     Physical Exam:  Body mass index is 26.39 kg/m².    Constitutional: Appears well-developed and well-nourished.   Head: Normocephalic and atraumatic.   Neck: Normal range of motion. Neck supple.   Cardiovascular: Normal heart rate.  Regular heart rhythm.  Pulmonary/Chest: Effort normal.   Abdominal: No distension.  No tenderness  Musculoskeletal: Normal range of motion. No edema.   Neurological: Alert and oriented to person, place, and time.   Skin: Skin is warm and dry.   Psychiatric: Normal mood and affect. Behavior is normal.      "             Vital Signs  Temp: 98.1 °F (36.7 °C) (05/05/25 1212)  Pulse: 106 (05/05/25 1212)  Resp: 16 (05/05/25 1212)  BP: (!) 196/86 (05/05/25 1212)  SpO2: 95 % (05/05/25 1212)     24 Hour VS Range    Temp:  [97.7 °F (36.5 °C)-98.2 °F (36.8 °C)]   Pulse:  []   Resp:  [16-19]   BP: (134-196)/(80-90)   SpO2:  [95 %-99 %]   No intake or output data in the 24 hours ending 05/05/25 1257        I/O This Shift:  No intake/output data recorded.    Wt Readings from Last 3 Encounters:   04/29/25 90.7 kg (200 lb)   04/22/25 92.8 kg (204 lb 9.4 oz)   04/16/25 91.9 kg (202 lb 9.6 oz)       I have personally reviewed the vitals and recorded Intake/Output     Laboratory/Diagnostic Data:    CBC/Anemia Labs: Coags:    Recent Labs   Lab 05/03/25  0237 05/04/25  0233 05/05/25  0513   WBC 4.08 4.35 5.05   HGB 8.4* 8.6* 9.0*   HCT 29.1* 29.8* 31.5*   * 115* 134*   MCV 78* 77* 78*   RDW 19.7* 19.7* 19.7*    No results for input(s): "PT", "INR", "APTT" in the last 168 hours.     Chemistries: ABG:   Recent Labs   Lab 04/29/25  0758 04/30/25  0619 05/03/25  0237 05/03/25  1627 05/04/25  0233 05/05/25  0513   *   < > 135* 137 137 138   K 3.3*   < > 3.2* 4.1 3.4* 3.9   CL 99   < > 106 106 106 106   CO2 25   < > 20* 23 20* 22*   BUN 49*   < > 45* 43* 43* 46*   CREATININE 2.7*   < > 2.6* 2.8* 2.7* 2.8*   CALCIUM 8.2*   < > 8.0* 8.5* 8.3* 8.7   PROT 5.9*  --   --   --   --   --    BILITOT 0.5  --   --   --   --   --    ALKPHOS 52  --   --   --   --   --    ALT <5*  --   --   --   --   --    AST 14  --   --   --   --   --    MG 2.2   < > 2.1  --  1.9 1.9   PHOS  --    < > 3.4 3.6 3.9 4.1    < > = values in this interval not displayed.    Recent Labs   Lab 04/29/25  0804   PH 7.439   PCO2 40.5   PO2 47.3   HCO3 26.8        POCT Glucose: HbA1c:    Recent Labs   Lab 05/03/25 2059 05/04/25  0817 05/04/25  1207 05/04/25  1650 05/04/25 2055 05/05/25  0816   POCTGLUCOSE 115* 160* 108 177* 234* 161*    Hemoglobin A1C   Date Value " "Ref Range Status   01/27/2025 8.6 (H) 4.0 - 5.6 % Final     Comment:     ADA Screening Guidelines:  5.7-6.4%  Consistent with prediabetes  >or=6.5%  Consistent with diabetes    High levels of fetal hemoglobin interfere with the HbA1C  assay. Heterozygous hemoglobin variants (HbS, HgC, etc)do  not significantly interfere with this assay.   However, presence of multiple variants may affect accuracy.     12/07/2024 8.1 (H) 4.0 - 5.6 % Final     Comment:     ADA Screening Guidelines:  5.7-6.4%  Consistent with prediabetes  >or=6.5%  Consistent with diabetes    High levels of fetal hemoglobin interfere with the HbA1C  assay. Heterozygous hemoglobin variants (HbS, HgC, etc)do  not significantly interfere with this assay.   However, presence of multiple variants may affect accuracy.     11/19/2024 7.4 (H) 4.0 - 5.6 % Final     Comment:     ADA Screening Guidelines:  5.7-6.4%  Consistent with prediabetes  >or=6.5%  Consistent with diabetes    High levels of fetal hemoglobin interfere with the HbA1C  assay. Heterozygous hemoglobin variants (HbS, HgC, etc)do  not significantly interfere with this assay.   However, presence of multiple variants may affect accuracy.       Hemoglobin A1c   Date Value Ref Range Status   03/27/2025 8.3 (H) 4.0 - 5.6 % Final     Comment:     ADA Screening Guidelines:  5.7-6.4%  Consistent with prediabetes  >=6.5%  Consistent with diabetes    High levels of fetal hemoglobin interfere with the HbA1C  assay. Heterozygous hemoglobin variants (HbS, HgC, etc)do  not significantly interfere with this assay.   However, presence of multiple variants may affect accuracy.        Cardiac Enzymes: Ejection Fractions:    No results for input(s): "CPK", "CPKMB", "MB", "TROPONINI" in the last 72 hours. EF   Date Value Ref Range Status   02/21/2025 53 % Final   06/16/2024 58 % Final          No results for input(s): "COLORU", "APPEARANCEUA", "PHUR", "SPECGRAV", "PROTEINUA", "GLUCUA", "KETONESU", "BILIRUBINUA", " ""OCCULTUA", "NITRITE", "UROBILINOGEN", "LEUKOCYTESUR", "RBCUA", "WBCUA", "BACTERIA", "SQUAMEPITHEL", "HYALINECASTS" in the last 48 hours.    Invalid input(s): "WRIGHTSUR"    Lactate (Lactic Acid) (mmol/L)   Date Value   07/11/2022 1.0   07/10/2022 3.0 (H)   07/10/2022 3.1 (H)   03/15/2019 1.5   03/15/2019 3.3 (H)     BNP (pg/mL)   Date Value   03/16/2025 1,766 (H)   02/20/2025 1,055 (H)   06/25/2024 992 (H)   06/15/2024 3,203 (H)   06/04/2024 3,313 (H)     CRP (mg/L)   Date Value   04/02/2024 6.9   07/10/2022 40.5 (H)     Sed Rate   Date Value   04/02/2024 62 mm/Hr (H)   03/09/2007 2 mm/hr   05/10/2006 6 mm/hr     D-Dimer (mg/L FEU)   Date Value   07/10/2022 0.45     Ferritin (ng/mL)   Date Value   04/01/2024 816 (H)   07/10/2022 148   02/07/2022 73   05/26/2021 29   01/30/2017 251   11/08/2016 389 (H)     LD (U/L)   Date Value   04/01/2024 206   07/10/2022 221   11/10/2021 220     Troponin I (ng/mL)   Date Value   06/15/2024 0.812 (H)   06/15/2024 0.948 (H)   06/15/2024 0.934 (H)   05/02/2024 0.036 (H)   05/02/2024 0.049 (H)   07/11/2022 0.090 (H)   07/10/2022 0.265 (H)   03/16/2019 0.037 (H)     CPK (U/L)   Date Value   07/10/2022 83   03/16/2019 56   05/09/2008 86   05/15/2006 87   05/10/2006 44   05/10/2006 29   05/09/2006 40     Results for orders placed or performed in visit on 07/11/24   Vitamin D    Collection Time: 07/11/24  4:44 PM   Result Value Ref Range    Vit D, 25-Hydroxy 17 (L) 30 - 96 ng/mL     *Note: Due to a large number of results and/or encounters for the requested time period, some results have not been displayed. A complete set of results can be found in Results Review.     SARS-CoV2 (COVID-19) Qualitative PCR (no units)   Date Value   05/02/2025 Not Detected   03/16/2025 Not Detected   12/18/2020 Not Detected     SARS-CoV-2 RNA, Amplification, Qual (no units)   Date Value   03/16/2025 Negative   12/07/2024 Negative     POC Rapid COVID (no units)   Date Value   07/08/2022 Positive (A) "   08/02/2021 Positive (A)       Microbiology labs for the last week  Microbiology Results (last 7 days)       Procedure Component Value Units Date/Time    Blood culture x two cultures. Draw prior to antibiotics. [[id number]]  (Normal) Collected: 04/29/25 0758    Order Status: Completed Specimen: Blood from Peripheral, Forearm, Left Updated: 05/04/25 1801     Blood Culture No Growth After 5 Days    Blood culture x two cultures. Draw prior to antibiotics. [[id number]]  (Normal) Collected: 04/29/25 0758    Order Status: Completed Specimen: Blood from Peripheral, Antecubital, Left Updated: 05/04/25 1801     Blood Culture No Growth After 5 Days    Respiratory Infection Panel (PCR), Nasopharyngeal [[id number]] Collected: 05/02/25 1027    Order Status: Completed Specimen: Nasopharyngeal Swab Updated: 05/02/25 1545     Respiratory Infection Panel Source Nasopharyngeal Swab     Adenovirus Not Detected     Coronavirus 229E, Common Cold Virus Not Detected     Coronavirus HKU1, Common Cold Virus Not Detected     Coronavirus NL63, Common Cold Virus Not Detected     Coronavirus OC43, Common Cold Virus Not Detected     SARS-CoV2 (COVID-19) Qualitative PCR Not Detected     Human Metapneumovirus Not Detected     Human Rhinovirus/Enterovirus Not Detected     Influenza A Not Detected     Influenza B Not Detected     Parainfluenza Virus 1 Not Detected     Parainfluenza Virus 2 Not Detected     Parainfluenza Virus 3 Not Detected     Parainfluenza Virus 4 Not Detected     Respiratory Syncytial Virus Not Detected     Bordetella Parapertussis (UI5975) Not Detected     Bordetella pertussis (ptxP) Not Detected     Chlamydia pneumoniae Not Detected     Mycoplasma pneumoniae Not Detected    MRSA Screen by PCR [[id number]]     Order Status: Sent Specimen: Nasal Swab     Culture, Respiratory with Gram Stain [[id number]]     Order Status: Sent Specimen: Respiratory     Culture, Respiratory with Gram Stain [[id number]]     Order Status: Sent  Specimen: Respiratory     Influenza A & B by Molecular [[id number]]  (Normal) Collected: 04/29/25 0749    Order Status: Completed Specimen: Nasal Swab Updated: 04/29/25 0836     INFLUENZA A MOLECULAR Negative     INFLUENZA B MOLECULAR  Negative            Reviewed and noted in plan where applicable- Please see chart for full lab data.    Lines/Drains:       Peripheral IV - Single Lumen 04/30/25 1112 20 G Anterior;Distal;Right Upper Arm (Active)   Site Assessment Clean;Dry;Intact;No redness;No swelling;No drainage 05/01/25 0800   Line Status Saline locked;Flushed 05/01/25 0800   Dressing Status Intact;Dry;Clean 05/01/25 0800   Dressing Intervention Integrity maintained 05/01/25 0800   Number of days: 1       Imaging  ECG Results              EKG 12-lead (Final result)        Collection Time Result Time QRS Duration OHS QTC Calculation    04/29/25 07:35:29 04/29/25 07:45:23 112 460                     Final result by Interface, Lab In [facility] (04/29/25 07:45:29)                   Narrative:    Test Reason : Z13.6,    Vent. Rate :  78 BPM     Atrial Rate : 300 BPM     P-R Int :    ms          QRS Dur : 112 ms      QT Int : 404 ms       P-R-T Axes : -89 105   8 degrees    QTcB Int : 460 ms    Atrial flutter  Rightward axis  Nonspecific ST and T wave abnormality  Low septal forces ; Abnormal R wave progression in the precordial leads  -Possible anterior infarct  Possible lateral infarct vs lead reversal  Prolonged QT  Abnormal ECG  When compared with ECG of 16-Apr-2025 13:38,  The axis Shifted right  ST no longer elevated in Anterior leads  Confirmed by Tejas Carter ([id number]) on 4/29/2025 7:45:21 AM    Referred By: AAAREFERRAL SELF           Confirmed By: Tejas Carter                                    Results for orders placed during the hospital encounter of 02/20/25    Echo    Interpretation Summary    Left Ventricle: There is mild concentric hypertrophy. There is low normal systolic function with a visually estimated  ejection fraction of 50 - 55%. Grade III diastolic dysfunction.    Left Ventricle: The left ventricle is at the upper limits of normal in size. Mildly increased wall thickness. There is mild concentric hypertrophy. Regional wall motion abnormalities present. See diagram for wall motion findings. There is low normal systolic function with a visually estimated ejection fraction of 50 - 55%. Ejection fraction is approximately 53%. Grade III diastolic dysfunction.    Right Ventricle: Systolic function is borderline low despite the low TAPSE.    Left Atrium: Left atrium is moderately dilated.    Aortic Valve: The aortic valve is a trileaflet valve. There is mild aortic valve sclerosis. There is moderate annular calcification present. There is mild aortic regurgitation with a centrally directed jet.    Mitral Valve: There is mild regurgitation with a centrally directed jet.    Pericardium: There is a trivial posterior effusion and under the RA.    Tricuspid Valve: There is mild regurgitation.    Pulmonary Artery: The estimated pulmonary artery systolic pressure is 30 mmHg.      CT Chest Without Contrast  Narrative: EXAMINATION:  CT CHEST WITHOUT CONTRAST    CLINICAL HISTORY:  Pneumonia, unresolved;    TECHNIQUE:  CT chest without contrast acquiring images from above the pulmonary apices to the upper abdomen.  Data was reconstructed for multiplanar images in axial, sagittal and coronal planes and for maximal intensity projection images in the the axial plane.    COMPARISON:  None.    FINDINGS:  Base of Neck/thoracic soft tissues: No significant abnormality.    Aorta: Left-sided aortic arch. No aneurysm. Three vessel aortic arch. Moderate calcified atherosclerosis of the thoracic aorta.    Heart/pericardium: Dilated left ventricle moderate multivessel calcified atherosclerosis of the coronary arteries.  No pericardial effusion.    Airways/Lungs/Pleura:  Central airways are patent. Bilateral multifocal ground glass opacities  and small irregular consolidations associated with bronchial wall thickening.  Right moderate and left small pleural effusions.  Mild apical paraseptal emphysema.  No pneumothorax.    Pulmonary vasculature: Unremarkable.    Marlen/Mediastinum: No pathologic christiano enlargement.    Esophagus: Unremarkable.    Upper Abdomen: Moderate abdominal calcified atherosclerosis involving the celiac trunk    Bones: Anterior large bridging osteophytes between T8-T9. No suspicious osseous lesion.  Impression: Bilateral multilobar consolidations and ground-glass opacities associated with bronchial thickening suggestive of infectious/inflammatory process.    Right moderate and left small pleural effusions.    Electronically signed by: Matthew Moore  Date:    05/01/2025  Time:    16:27      Labs, Imaging, EKG and Diagnostic results from 5/5/2025 were reviewed.    Medications:  Medication list was reviewed and changes noted under Assessment/Plan.  Medications Ordered Prior to Encounter[1]  Scheduled Medications:  Current Facility-Administered Medications   Medication Dose Route Frequency    acetaminophen  1,000 mg Oral Q12H    atorvastatin  80 mg Oral Daily    ceFEPime IV (PEDS and ADULTS)  2 g Intravenous Q12H    cloNIDine 0.1 mg/24 hr td ptwk  1 patch Transdermal Q7 Days    doxazosin  8 mg Oral Daily    empagliflozin  10 mg Oral Daily    gabapentin  300 mg Oral Daily    gabapentin  300 mg Oral After lunch    gabapentin  300 mg Oral QHS    hydrALAZINE  100 mg Oral Q8H    insulin glargine U-100  15 Units Subcutaneous Daily    Lactobacillus rhamnosus GG  1 capsule Oral Daily    polyethylene glycol  17 g Oral Daily    predniSONE  5 mg Oral Daily    rivaroxaban  15 mg Oral Daily with dinner    tacrolimus  1 mg Oral BID    torsemide  40 mg Oral Daily    valsartan  320 mg Oral Daily     PRN:   Current Facility-Administered Medications:     albuterol-ipratropium, 3 mL, Nebulization, Q6H PRN    cloNIDine, 0.1 mg, Oral, Once PRN     dextrose 50%, 12.5 g, Intravenous, PRN    dextrose 50%, 25 g, Intravenous, PRN    glucagon (human recombinant), 1 mg, Intramuscular, PRN    glucose, 16 g, Oral, PRN    glucose, 24 g, Oral, PRN    hydrALAZINE, 15 mg, Intravenous, Q6H PRN    insulin aspart U-100, 0-10 Units, Subcutaneous, QID (AC + HS) PRN    meclizine, 25 mg, Oral, TID PRN    melatonin, 6 mg, Oral, Nightly PRN    naloxone, 0.02 mg, Intravenous, PRN    ondansetron, 8 mg, Oral, Q8H PRN    prochlorperazine, 5 mg, Intravenous, Q6H PRN    sodium chloride 0.9%, 10 mL, Intravenous, Q8H PRN  Infusions:   Estimated Creatinine Clearance: 28.9 mL/min (A) (based on SCr of 2.8 mg/dL (H)).             Assessment & Plan  Community acquired bacterial pneumonia  Hemoptysis    Patient has a diagnosis of pneumonia. The cause of the pneumonia is suspected to be bacterial in etiology but organism is not known. The pneumonia is stable. The patient has the following signs/symptoms of pneumonia: cough. The patient does not have a current oxygen requirement and the patient does not have a home oxygen requirement. I have reviewed the pertinent imaging. The following cultures have been collected: Blood cultures and Sputum culture The culture results are listed below.     Current antimicrobial regimen consists of the antibiotics listed below. Will monitor patient closely and continue current treatment plan unchanged.    -Given fevers/chills and recurrent pna, reasonable to continue below abx and deescalate when indicated  -f/u sputum, blood cx    Antibiotics (From admission, onward)      Start     Stop Route Frequency Ordered    05/05/25 0728  ceFEPIme injection 2 g         05/06/25 0729 IV Every 12 hours (non-standard times) 05/05/25 0728    04/30/25 0900  azithromycin (ZITHROMAX) 500 mg in 0.9% NaCl 250 mL IVPB (admixture device)         05/01/25 1259 IV Every 24 hours (non-standard times) 04/29/25 1016          Microbiology Results (last 7 days)       Procedure Component  Value Units Date/Time    Blood culture x two cultures. Draw prior to antibiotics. [[id number]]  (Normal) Collected: 04/29/25 0758    Order Status: Completed Specimen: Blood from Peripheral, Forearm, Left Updated: 05/04/25 1801     Blood Culture No Growth After 5 Days    Blood culture x two cultures. Draw prior to antibiotics. [[id number]]  (Normal) Collected: 04/29/25 0758    Order Status: Completed Specimen: Blood from Peripheral, Antecubital, Left Updated: 05/04/25 1801     Blood Culture No Growth After 5 Days    Respiratory Infection Panel (PCR), Nasopharyngeal [[id number]] Collected: 05/02/25 1027    Order Status: Completed Specimen: Nasopharyngeal Swab Updated: 05/02/25 1545     Respiratory Infection Panel Source Nasopharyngeal Swab     Adenovirus Not Detected     Coronavirus 229E, Common Cold Virus Not Detected     Coronavirus HKU1, Common Cold Virus Not Detected     Coronavirus NL63, Common Cold Virus Not Detected     Coronavirus OC43, Common Cold Virus Not Detected     SARS-CoV2 (COVID-19) Qualitative PCR Not Detected     Human Metapneumovirus Not Detected     Human Rhinovirus/Enterovirus Not Detected     Influenza A Not Detected     Influenza B Not Detected     Parainfluenza Virus 1 Not Detected     Parainfluenza Virus 2 Not Detected     Parainfluenza Virus 3 Not Detected     Parainfluenza Virus 4 Not Detected     Respiratory Syncytial Virus Not Detected     Bordetella Parapertussis ([id number]) Not Detected     Bordetella pertussis (ptxP) Not Detected     Chlamydia pneumoniae Not Detected     Mycoplasma pneumoniae Not Detected    MRSA Screen by PCR [[id number]]     Order Status: Sent Specimen: Nasal Swab     Culture, Respiratory with Gram Stain [[id number]]     Order Status: Sent Specimen: Respiratory     Culture, Respiratory with Gram Stain [[id number]]     Order Status: Sent Specimen: Respiratory     Influenza A & B by Molecular [[id number]]  (Normal) Collected: 04/29/25 0749    Order Status: Completed  Specimen: Nasal Swab Updated: 04/29/25 0836     INFLUENZA A MOLECULAR Negative     INFLUENZA B MOLECULAR  Negative          5/1  BC x2 4/29 NGTD. vancomycins discontinued.  respiratory cultures pending.  CT chest ordered -RML GGO concerning for pneumonia possible hiatal hernia and possible anterior vertebral osteophyte near mid esophagus.  SLP consulted  and consider esophogram to assess for hiatal hernia and recurrent aspiration as a cause of repeat pneumonia. continue cefepime. completed azithromycin. denies hemoptysis this admission. last episode 2 weeks back. resumed prograf 1mg BID. continue prednisone. hold cellcept for now     Patient has a diagnosis of pneumonia. The cause of the pneumonia is suspected to be bacterial in etiology but organism is not known. The pneumonia is stable. The patient has the following signs/symptoms of pneumonia: cough. The patient does not have a current oxygen requirement and the patient does not have a home oxygen requirement. I have reviewed the pertinent imaging. The following cultures have been collected: Blood cultures and Sputum culture The culture results are listed below.     Current antimicrobial regimen consists of the antibiotics listed below. Will monitor patient closely and continue current treatment plan unchanged.    -Given fevers/chills and recurrent pna, reasonable to continue below abx and deescalate when indicated  -f/u sputum, blood cx    Antibiotics (From admission, onward)      Start     Stop Route Frequency Ordered    05/05/25 0728  ceFEPIme injection 2 g         05/06/25 0729 IV Every 12 hours (non-standard times) 05/05/25 0728    04/30/25 0900  azithromycin (ZITHROMAX) 500 mg in 0.9% NaCl 250 mL IVPB (admixture device)         05/01/25 1259 IV Every 24 hours (non-standard times) 04/29/25 1016          Antibiotics (From admission, onward)      Start     Stop Route Frequency Ordered    05/05/25 0728  ceFEPIme injection 2 g         05/06/25 0729 IV Every 12  hours (non-standard times) 05/05/25 0728    04/30/25 0900  azithromycin (ZITHROMAX) 500 mg in 0.9% NaCl 250 mL IVPB (admixture device)         05/01/25 1259 IV Every 24 hours (non-standard times) 04/29/25 1016          5/1  PMH of DM, renal transplant 2016, CHFpEF, AF/AFL (PVI/PWI/CTI 6/2024), CVA, recurrent PNA who presents with fevers, chills.  States that symptoms are similar to last month when he was admitted for pneumonia. No other pna symptoms despite CXR changes.BC x2 4/29 NGTD. vancomycins discontinued.  respiratory cultures pending.  CT chest ordered -L GGO concerning for pneumonia possible hiatal hernia and possible anterior vertebral osteophyte near mid esophagus.  SLP consulted  and  barium esophogram to assess for hiatal hernia (second episode of pneumonia since March 2025) and recurrent aspiration as a cause of repeat pneumonia. continue cefepime. completed azithromycin. denies hemoptysis this admission. last episode 2 weeks back. resumed prograf 1mg BID. continue prednisone. hold cellcept for now     Patient has a diagnosis of pneumonia. The cause of the pneumonia is suspected to be bacterial in etiology but organism is not known. The pneumonia is stable. The patient has the following signs/symptoms of pneumonia: cough. The patient does not have a current oxygen requirement and the patient does not have a home oxygen requirement. I have reviewed the pertinent imaging. The following cultures have been collected: Blood cultures and Sputum culture The culture results are listed below.     Current antimicrobial regimen consists of the antibiotics listed below. Will monitor patient closely and continue current treatment plan unchanged.    -Given fevers/chills and recurrent pna, reasonable to continue below abx and deescalate when indicated  -f/u sputum, blood cx    Antibiotics (From admission, onward)      Start     Stop Route Frequency Ordered    05/05/25 0728  ceFEPIme injection 2 g         05/06/25  0729 IV Every 12 hours (non-standard times) 05/05/25 0728    04/30/25 0900  azithromycin (ZITHROMAX) 500 mg in 0.9% NaCl 250 mL IVPB (admixture device)         05/01/25 1259 IV Every 24 hours (non-standard times) 04/29/25 1016          Microbiology Results (last 7 days)       Procedure Component Value Units Date/Time    Blood culture x two cultures. Draw prior to antibiotics. [[id number]]  (Normal) Collected: 04/29/25 0758    Order Status: Completed Specimen: Blood from Peripheral, Forearm, Left Updated: 05/04/25 1801     Blood Culture No Growth After 5 Days    Blood culture x two cultures. Draw prior to antibiotics. [[id number]]  (Normal) Collected: 04/29/25 0758    Order Status: Completed Specimen: Blood from Peripheral, Antecubital, Left Updated: 05/04/25 1801     Blood Culture No Growth After 5 Days    Respiratory Infection Panel (PCR), Nasopharyngeal [[id number]] Collected: 05/02/25 1027    Order Status: Completed Specimen: Nasopharyngeal Swab Updated: 05/02/25 1545     Respiratory Infection Panel Source Nasopharyngeal Swab     Adenovirus Not Detected     Coronavirus 229E, Common Cold Virus Not Detected     Coronavirus HKU1, Common Cold Virus Not Detected     Coronavirus NL63, Common Cold Virus Not Detected     Coronavirus OC43, Common Cold Virus Not Detected     SARS-CoV2 (COVID-19) Qualitative PCR Not Detected     Human Metapneumovirus Not Detected     Human Rhinovirus/Enterovirus Not Detected     Influenza A Not Detected     Influenza B Not Detected     Parainfluenza Virus 1 Not Detected     Parainfluenza Virus 2 Not Detected     Parainfluenza Virus 3 Not Detected     Parainfluenza Virus 4 Not Detected     Respiratory Syncytial Virus Not Detected     Bordetella Parapertussis (EY1395) Not Detected     Bordetella pertussis (ptxP) Not Detected     Chlamydia pneumoniae Not Detected     Mycoplasma pneumoniae Not Detected    MRSA Screen by PCR [[id number]]     Order Status: Sent Specimen: Nasal Swab     Culture,  Respiratory with Gram Stain [[id number]]     Order Status: Sent Specimen: Respiratory     Culture, Respiratory with Gram Stain [[id number]]     Order Status: Sent Specimen: Respiratory     Influenza A & B by Molecular [[id number]]  (Normal) Collected: 04/29/25 0749    Order Status: Completed Specimen: Nasal Swab Updated: 04/29/25 0836     INFLUENZA A MOLECULAR Negative     INFLUENZA B MOLECULAR  Negative          5/1  BC x2 4/29 NGTD. vancomycins discontinued.  respiratory cultures pending.  CT chest ordered -L GGO concerning for pneumonia possible hiatal hernia and possible anterior vertebral osteophyte near mid esophagus.  SLP consulted  and consider esophogram to assess for hiatal hernia and recurrent aspiration as a cause of repeat pneumonia. continue cefepime. completed azithromycin. denies hemoptysis this admission. last episode 2 weeks back. resumed prograf 1mg BID. continue prednisone. hold cellcept for now     Patient has a diagnosis of pneumonia. The cause of the pneumonia is suspected to be bacterial in etiology but organism is not known. The pneumonia is stable. The patient has the following signs/symptoms of pneumonia: cough. The patient does not have a current oxygen requirement and the patient does not have a home oxygen requirement. I have reviewed the pertinent imaging. The following cultures have been collected: Blood cultures and Sputum culture The culture results are listed below.     Current antimicrobial regimen consists of the antibiotics listed below. Will monitor patient closely and continue current treatment plan unchanged.    -Given fevers/chills and recurrent pna, reasonable to continue below abx and deescalate when indicated  -f/u sputum, blood cx    Antibiotics (From admission, onward)      Start     Stop Route Frequency Ordered    05/05/25 0728  ceFEPIme injection 2 g         05/06/25 0729 IV Every 12 hours (non-standard times) 05/05/25 0728 04/30/25 0900  azithromycin (ZITHROMAX)  500 mg in 0.9% NaCl 250 mL IVPB (admixture device)         05/01/25 1259 IV Every 24 hours (non-standard times) 04/29/25 1016          Antibiotics (From admission, onward)      Start     Stop Route Frequency Ordered    05/05/25 0728  ceFEPIme injection 2 g         05/06/25 0729 IV Every 12 hours (non-standard times) 05/05/25 0728    04/30/25 0900  azithromycin (ZITHROMAX) 500 mg in 0.9% NaCl 250 mL IVPB (admixture device)         05/01/25 1259 IV Every 24 hours (non-standard times) 04/29/25 1016            Patient has a diagnosis of pneumonia. The cause of the pneumonia is suspected to be bacterial in etiology but organism is not known. The pneumonia is stable. The patient has the following signs/symptoms of pneumonia: cough. The patient does not have a current oxygen requirement and the patient does not have a home oxygen requirement. I have reviewed the pertinent imaging. The following cultures have been collected: Blood cultures and Sputum culture The culture results are listed below.     Current antimicrobial regimen consists of the antibiotics listed below. Will monitor patient closely and continue current treatment plan unchanged.    -Given fevers/chills and recurrent pna, reasonable to continue below abx and deescalate when indicated  -f/u sputum, blood cx    Antibiotics (From admission, onward)      Start     Stop Route Frequency Ordered    05/05/25 0728  ceFEPIme injection 2 g         05/06/25 0729 IV Every 12 hours (non-standard times) 05/05/25 0728    04/30/25 0900  azithromycin (ZITHROMAX) 500 mg in 0.9% NaCl 250 mL IVPB (admixture device)         05/01/25 1259 IV Every 24 hours (non-standard times) 04/29/25 1016          Microbiology Results (last 7 days)       Procedure Component Value Units Date/Time    Blood culture x two cultures. Draw prior to antibiotics. [[id number]]  (Normal) Collected: 04/29/25 1883    Order Status: Completed Specimen: Blood from Peripheral, Forearm, Left Updated: 05/04/25 5499      Blood Culture No Growth After 5 Days    Blood culture x two cultures. Draw prior to antibiotics. [[id number]]  (Normal) Collected: 04/29/25 0758    Order Status: Completed Specimen: Blood from Peripheral, Antecubital, Left Updated: 05/04/25 1801     Blood Culture No Growth After 5 Days    Respiratory Infection Panel (PCR), Nasopharyngeal [[id number]] Collected: 05/02/25 1027    Order Status: Completed Specimen: Nasopharyngeal Swab Updated: 05/02/25 1545     Respiratory Infection Panel Source Nasopharyngeal Swab     Adenovirus Not Detected     Coronavirus 229E, Common Cold Virus Not Detected     Coronavirus HKU1, Common Cold Virus Not Detected     Coronavirus NL63, Common Cold Virus Not Detected     Coronavirus OC43, Common Cold Virus Not Detected     SARS-CoV2 (COVID-19) Qualitative PCR Not Detected     Human Metapneumovirus Not Detected     Human Rhinovirus/Enterovirus Not Detected     Influenza A Not Detected     Influenza B Not Detected     Parainfluenza Virus 1 Not Detected     Parainfluenza Virus 2 Not Detected     Parainfluenza Virus 3 Not Detected     Parainfluenza Virus 4 Not Detected     Respiratory Syncytial Virus Not Detected     Bordetella Parapertussis ([id number]) Not Detected     Bordetella pertussis (ptxP) Not Detected     Chlamydia pneumoniae Not Detected     Mycoplasma pneumoniae Not Detected    MRSA Screen by PCR [[id number]]     Order Status: Sent Specimen: Nasal Swab     Culture, Respiratory with Gram Stain [[id number]]     Order Status: Sent Specimen: Respiratory     Culture, Respiratory with Gram Stain [[id number]]     Order Status: Sent Specimen: Respiratory     Influenza A & B by Molecular [[id number]]  (Normal) Collected: 04/29/25 0749    Order Status: Completed Specimen: Nasal Swab Updated: 04/29/25 0836     INFLUENZA A MOLECULAR Negative     INFLUENZA B MOLECULAR  Negative          5/1  BC x2 4/29 NGTD. vancomycins discontinued.  respiratory cultures pending.  CT chest ordered -RML GGO  concerning for pneumonia possible hiatal hernia and possible anterior vertebral osteophyte near mid esophagus.  SLP consulted  and consider esophogram to assess for hiatal hernia and recurrent aspiration as a cause of repeat pneumonia. continue cefepime. completed azithromycin. denies hemoptysis this admission. last episode 2 weeks back. resumed prograf 1mg BID. continue prednisone. hold cellcept for now     Patient has a diagnosis of pneumonia. The cause of the pneumonia is suspected to be bacterial in etiology but organism is not known. The pneumonia is stable. The patient has the following signs/symptoms of pneumonia: cough. The patient does not have a current oxygen requirement and the patient does not have a home oxygen requirement. I have reviewed the pertinent imaging. The following cultures have been collected: Blood cultures and Sputum culture The culture results are listed below.     Current antimicrobial regimen consists of the antibiotics listed below. Will monitor patient closely and continue current treatment plan unchanged.    -Given fevers/chills and recurrent pna, reasonable to continue below abx and deescalate when indicated  -f/u sputum, blood cx    Antibiotics (From admission, onward)      Start     Stop Route Frequency Ordered    05/05/25 0728  ceFEPIme injection 2 g         05/06/25 0729 IV Every 12 hours (non-standard times) 05/05/25 0728    04/30/25 0900  azithromycin (ZITHROMAX) 500 mg in 0.9% NaCl 250 mL IVPB (admixture device)         05/01/25 1259 IV Every 24 hours (non-standard times) 04/29/25 1016          Antibiotics (From admission, onward)      Start     Stop Route Frequency Ordered    05/05/25 0728  ceFEPIme injection 2 g         05/06/25 0729 IV Every 12 hours (non-standard times) 05/05/25 0728    04/30/25 0900  azithromycin (ZITHROMAX) 500 mg in 0.9% NaCl 250 mL IVPB (admixture device)         05/01/25 1259 IV Every 24 hours (non-standard times) 04/29/25 1016          5/1  PMH of  DM, renal transplant 2016, CHFpEF, AF/AFL (PVI/PWI/CTI 6/2024), CVA, recurrent PNA who presents with fevers, chills.  States that symptoms are similar to last month when he was admitted for pneumonia. No other pna symptoms despite CXR changes.BC x2 4/29 NGTD. vancomycins discontinued.  respiratory cultures pending.  CT chest ordered -L GGO concerning for pneumonia possible hiatal hernia and possible anterior vertebral osteophyte near mid esophagus.  SLP consulted  and  barium esophogram to assess for hiatal hernia (second episode of pneumonia since March 2025) and recurrent aspiration as a cause of repeat pneumonia. continue cefepime. completed azithromycin. denies hemoptysis this admission. last episode 2 weeks back. resumed prograf 1mg BID. continue prednisone. hold cellcept for now     Patient has a diagnosis of pneumonia. The cause of the pneumonia is suspected to be bacterial in etiology but organism is not known. The pneumonia is stable. The patient has the following signs/symptoms of pneumonia: cough. The patient does not have a current oxygen requirement and the patient does not have a home oxygen requirement. I have reviewed the pertinent imaging. The following cultures have been collected: Blood cultures and Sputum culture The culture results are listed below.     Current antimicrobial regimen consists of the antibiotics listed below. Will monitor patient closely and continue current treatment plan unchanged.    -Given fevers/chills and recurrent pna, reasonable to continue below abx and deescalate when indicated  -f/u sputum, blood cx    Antibiotics (From admission, onward)      Start     Stop Route Frequency Ordered    05/05/25 0728  ceFEPIme injection 2 g         05/06/25 0729 IV Every 12 hours (non-standard times) 05/05/25 0728    04/30/25 0900  azithromycin (ZITHROMAX) 500 mg in 0.9% NaCl 250 mL IVPB (admixture device)         05/01/25 1259 IV Every 24 hours (non-standard times) 04/29/25 1016           Microbiology Results (last 7 days)       Procedure Component Value Units Date/Time    Blood culture x two cultures. Draw prior to antibiotics. [[id number]]  (Normal) Collected: 04/29/25 0758    Order Status: Completed Specimen: Blood from Peripheral, Forearm, Left Updated: 05/04/25 1801     Blood Culture No Growth After 5 Days    Blood culture x two cultures. Draw prior to antibiotics. [[id number]]  (Normal) Collected: 04/29/25 0758    Order Status: Completed Specimen: Blood from Peripheral, Antecubital, Left Updated: 05/04/25 1801     Blood Culture No Growth After 5 Days    Respiratory Infection Panel (PCR), Nasopharyngeal [[id number]] Collected: 05/02/25 1027    Order Status: Completed Specimen: Nasopharyngeal Swab Updated: 05/02/25 1545     Respiratory Infection Panel Source Nasopharyngeal Swab     Adenovirus Not Detected     Coronavirus 229E, Common Cold Virus Not Detected     Coronavirus HKU1, Common Cold Virus Not Detected     Coronavirus NL63, Common Cold Virus Not Detected     Coronavirus OC43, Common Cold Virus Not Detected     SARS-CoV2 (COVID-19) Qualitative PCR Not Detected     Human Metapneumovirus Not Detected     Human Rhinovirus/Enterovirus Not Detected     Influenza A Not Detected     Influenza B Not Detected     Parainfluenza Virus 1 Not Detected     Parainfluenza Virus 2 Not Detected     Parainfluenza Virus 3 Not Detected     Parainfluenza Virus 4 Not Detected     Respiratory Syncytial Virus Not Detected     Bordetella Parapertussis ([id number]) Not Detected     Bordetella pertussis (ptxP) Not Detected     Chlamydia pneumoniae Not Detected     Mycoplasma pneumoniae Not Detected    MRSA Screen by PCR [[id number]]     Order Status: Sent Specimen: Nasal Swab     Culture, Respiratory with Gram Stain [[id number]]     Order Status: Sent Specimen: Respiratory     Culture, Respiratory with Gram Stain [[id number]]     Order Status: Sent Specimen: Respiratory     Influenza A & B by Molecular [[id number]]   (Normal) Collected: 04/29/25 0749    Order Status: Completed Specimen: Nasal Swab Updated: 04/29/25 0836     INFLUENZA A MOLECULAR Negative     INFLUENZA B MOLECULAR  Negative          5/1  BC x2 4/29 NGTD. vancomycins discontinued.  respiratory cultures pending.  CT chest ordered -RML GGO concerning for pneumonia possible hiatal hernia and possible anterior vertebral osteophyte near mid esophagus.  SLP consulted  and consider esophogram to assess for hiatal hernia and recurrent aspiration as a cause of repeat pneumonia. continue cefepime. completed azithromycin. denies hemoptysis this admission. last episode 2 weeks back. resumed prograf 1mg BID. continue prednisone. hold cellcept for now     Patient has a diagnosis of pneumonia. The cause of the pneumonia is suspected to be bacterial in etiology but organism is not known. The pneumonia is stable. The patient has the following signs/symptoms of pneumonia: cough. The patient does not have a current oxygen requirement and the patient does not have a home oxygen requirement. I have reviewed the pertinent imaging. The following cultures have been collected: Blood cultures and Sputum culture The culture results are listed below.     Current antimicrobial regimen consists of the antibiotics listed below. Will monitor patient closely and continue current treatment plan unchanged.    -Given fevers/chills and recurrent pna, reasonable to continue below abx and deescalate when indicated  -f/u sputum, blood cx    Antibiotics (From admission, onward)      Start     Stop Route Frequency Ordered    05/05/25 0728  ceFEPIme injection 2 g         05/06/25 0729 IV Every 12 hours (non-standard times) 05/05/25 0728    04/30/25 0900  azithromycin (ZITHROMAX) 500 mg in 0.9% NaCl 250 mL IVPB (admixture device)         05/01/25 1259 IV Every 24 hours (non-standard times) 04/29/25 1016          Antibiotics (From admission, onward)      Start     Stop Route Frequency Ordered    05/05/25  "0728  ceFEPIme injection 2 g         05/06/25 0729 IV Every 12 hours (non-standard times) 05/05/25 0728    04/30/25 0900  azithromycin (ZITHROMAX) 500 mg in 0.9% NaCl 250 mL IVPB (admixture device)         05/01/25 1259 IV Every 24 hours (non-standard times) 04/29/25 1016          Type 2 diabetes mellitus with stage 3a chronic kidney disease, with long-term current use of insulin  Creatine stable for now. BMP reviewed- noted Estimated Creatinine Clearance: 28.9 mL/min (A) (based on SCr of 2.8 mg/dL (H)). according to latest data. Based on current GFR, CKD stage is stage 3 - GFR 30-59.  Monitor UOP and serial BMP and adjust therapy as needed. Renally dose meds. Avoid nephrotoxic medications and procedures.  Hyperlipidemia  Continue statin    Prophylactic immunotherapy  KTM consutled for assistance with management     KTM consutled for assistance with management     KTM consutled for assistance with management     Chronic combined systolic (congestive) and diastolic (congestive) heart failure  Patient has Diastolic (HFpEF) heart failure that is Chronic. On presentation their CHF was well compensated. Most recent BNP and echo results are listed below.  No results for input(s): "BNP" in the last 72 hours.  Latest ECHO  Results for orders placed during the hospital encounter of 02/20/25    Echo    Interpretation Summary    Left Ventricle: There is mild concentric hypertrophy. There is low normal systolic function with a visually estimated ejection fraction of 50 - 55%. Grade III diastolic dysfunction.    Left Ventricle: The left ventricle is at the upper limits of normal in size. Mildly increased wall thickness. There is mild concentric hypertrophy. Regional wall motion abnormalities present. See diagram for wall motion findings. There is low normal systolic function with a visually estimated ejection fraction of 50 - 55%. Ejection fraction is approximately 53%. Grade III diastolic dysfunction.    Right Ventricle: " Systolic function is borderline low despite the low TAPSE.    Left Atrium: Left atrium is moderately dilated.    Aortic Valve: The aortic valve is a trileaflet valve. There is mild aortic valve sclerosis. There is moderate annular calcification present. There is mild aortic regurgitation with a centrally directed jet.    Mitral Valve: There is mild regurgitation with a centrally directed jet.    Pericardium: There is a trivial posterior effusion and under the RA.    Tricuspid Valve: There is mild regurgitation.    Pulmonary Artery: The estimated pulmonary artery systolic pressure is 30 mmHg.    Current Heart Failure Medications  hydrALAZINE tablet 100 mg, Every 8 hours, Oral  empagliflozin (Jardiance) tablet 10 mg, Daily, Oral  hydrALAZINE injection 15 mg, Every 6 hours PRN, Intravenous  torsemide tablet 40 mg, Daily, Oral  valsartan tablet 320 mg, Daily, Oral  torsemide (DEMADEX) tablet, Daily, Oral    Plan  - Monitor strict I&Os and daily weights.    - Place on telemetry  - Low sodium diet  - Cardiology has not been consulted  - The patient's volume status is at their baseline  S/P cadaveric kidney transplant 11/5/2016. ESRD secondary to HTN/DMII  -KTM consulted  -daily tacro level, dosing per KTM  -Cont prednisone    Immunocompromised state due to drug therapy  KTM consutled for assistance with management     KTM consutled for assistance with management     KTM consutled for assistance with management     CKD IV (severe)  Creatine stable for now. BMP reviewed- noted Estimated Creatinine Clearance: 28.9 mL/min (A) (based on SCr of 2.8 mg/dL (H)). according to latest data. Based on current GFR, CKD stage is stage 3 - GFR 30-59.  Monitor UOP and serial BMP and adjust therapy as needed. Renally dose meds. Avoid nephrotoxic medications and procedures.    Recent Labs   Lab 05/03/25  1627 05/04/25  0233 05/05/25  0513   BUN 43* 43* 46*   CREATININE 2.8* 2.7* 2.8*       I & O   No intake or output data in the 24 hours  ending 05/05/25 1257     5/3 KTM f/u - Cr elevated from his baseline - suspicion for failing allograft and approaching need for dialysis .  hold valsartan for now. monitor BP  Type 2 diabetes mellitus  Patient's FSGs are controlled on current medication regimen.  Last A1c reviewed-   Lab Results   Component Value Date    HGBA1C 8.3 (H) 03/27/2025     Most recent fingerstick glucose reviewed-   Recent Labs   Lab 05/04/25  1650 05/04/25 2055 05/05/25  0816   POCTGLUCOSE 177* 234* 161*     Current correctional scale  Medium  Maintain anti-hyperglycemic dose as follows-   Antihyperglycemics (From admission, onward)      Start     Stop Route Frequency Ordered    05/05/25 0900  insulin glargine U-100 (Lantus) pen 15 Units         -- SubQ Daily 05/04/25 1922    04/29/25 1115  empagliflozin (Jardiance) tablet 10 mg        Question Answer Comment   Does this patient have a diagnosis of heart failure? Yes    Does this patient have type 1 diabetes or diabetic ketoacidosis? No    Does this patient have symptomatic hypotension? No    Is the patient NPO or pending major surgery in next 3 days or less? No        -- Oral Daily 04/29/25 1016    04/29/25 1113  insulin aspart U-100 pen 0-10 Units         -- SubQ Before meals & nightly PRN 04/29/25 1016          Hold Oral hypoglycemics while patient is in the hospital.    Blood glucose acceptable  Persistent atrial fibrillation  Patient has persistent (7 days or more) atrial fibrillation. Patient is currently in atrial fibrillation. [id number] Score: 3. The patients heart rate in the last 24 hours is as follows:  Pulse  Min: 77  Max: 149     Antiarrhythmics       Anticoagulants  rivaroxaban tablet 15 mg, With dinner, Oral    Plan  - Replete lytes with a goal of K>4, Mg >2  - Patient is anticoagulated, see medications listed above.  - Patient's afib is currently controlled  Anticoagulant long-term use  This patient has long term use on an anticoagulant with Select Anticoagulant(s):  Direct oral anticoagulant: Rivaroxaban (Xarelto). Their long term anticoagulation will be Held or Continued: continued. They are on long term anticoagulation due to Reason for Anticoagulation: Atrial fibrillation.   BPH with urinary obstruction  Doxazosin   Immunosuppressed status  KTM consutled for assistance with management     KTM consutled for assistance with management     KTM consutled for assistance with management     Anemia of chronic disease  Anemia is likely due to chronic disease due to Chronic Kidney Disease. Most recent hemoglobin and hematocrit are listed below.  Recent Labs     05/03/25  0237 05/04/25  0233 05/05/25  0513   HGB 8.4* 8.6* 9.0*   HCT 29.1* 29.8* 31.5*     Plan  - Monitor serial CBC: Daily  - Transfuse PRBC if patient becomes hemodynamically unstable, symptomatic or H/H drops below 7/21.  - Patient has not received any PRBC transfusions to date  - Patient's anemia is currently stable  Hypokalemia  Patient's most recent potassium results are listed below.   Recent Labs     05/03/25  1627 05/04/25  0233 05/05/25 0513   K 4.1 3.4* 3.9     Plan  - Replete potassium per protocol  - Monitor potassium Daily  - Patient's hypokalemia is stable  Pleural effusion  5/2 CT chest -Bilateral multilobar consolidations and ground-glass opacities associated with bronchial thickening suggestive of infectious/inflammatory process. Right moderate and left small pleural effusions.  ID consulted for recurrent pneumonia/ immunosuppressed/ hemoptysis.    Hypertensive urgency  Patient has a current diagnosis of hypertensive urgency (without evidence of end organ damage) which is uncontrolled.  Latest blood pressure and vitals reviewed-   Temp:  [97.7 °F (36.5 °C)-98.2 °F (36.8 °C)]   Pulse:  []   Resp:  [16-19]   BP: (134-196)/(80-90)   SpO2:  [95 %-99 %] .   Patient currently on IV antihypertensives.   Home meds for hypertension were reviewed and noted below.   Hypertension Medications               doxazosin (CARDURA) 4 MG tablet Take 1 tablet (4 mg total) by mouth once daily.    furosemide (LASIX) 40 MG tablet Take 2 tablets (80 mg total) by mouth daily as needed (for swelling).    hydrALAZINE (APRESOLINE) 100 MG tablet Take 1 tablet (100 mg total) by mouth every 8 (eight) hours.    hydroCHLOROthiazide 12.5 MG Tab Take 1 tablet (12.5 mg total) by mouth once daily.    valsartan (DIOVAN) 320 MG tablet Take 1 tablet (320 mg total) by mouth once daily.            5/3 SBP 200s overnight. started on IV hydralzine. received IV hydralazine 15mg x 2 doses. /84.   5/4 SBP 200s overnight. received hydralazine 15mg x 1 overnight. discussed with KTM. Cr stable. resume valsartan. Clonidine 0.1 mg PRN for SBP > 170mm HG   5/5 SBP 180s overnight. SBP 180s overnight. started on clonidine patch and  eplerenone 50mg daily.       VTE Risk Mitigation (From admission, onward)           Ordered     rivaroxaban tablet 15 mg  With dinner         04/29/25 1016     IP VTE HIGH RISK PATIENT  Once         04/29/25 1016     Place sequential compression device  Until discontinued         04/29/25 1016                    Discharge Planning   UBALDO: 5/7/2025     Code Status: Full Code   Medical Readiness for Discharge Date:   Discharge Plan A: Home with family                        Cliff TABATHA Guaman MD  Department of Hospital Medicine   [facility]         [1]   Current Facility-Administered Medications on File Prior to Encounter   Medication Dose Route Frequency Provider Last Rate Last Admin    balanced salt irrigation intra-ocular solution 1 drop  1 drop Right Eye On Call Procedure Jennifer Palacio MD        phenylephrine HCL 10% ophthalmic solution 1 drop  1 drop Right Eye PRN Jennifer Palacio MD        proparacaine 0.5 % ophthalmic solution 1 drop  1 drop Right Eye Daily PRN Jennifer Palacio MD        sodium chloride 0.9% flush 10 mL  10 mL Intravenous PRN Jennifer Palacio MD        TETRAcaine HCl (PF) 0.5 % Drop 1  "drop  1 drop Right Eye Jennifer Haley MD         Current Outpatient Medications on File Prior to Encounter   Medication Sig Dispense Refill    atorvastatin (LIPITOR) 80 MG tablet Take 1 tablet (80 mg total) by mouth once daily. 90 tablet 3    blood sugar diagnostic Strp Use to check blood glucose 1 times daily, to use with insurance preferred meter 100 each 11    blood-glucose meter Misc Use to check blood glucose 1 times daily, to use with insurance preferred meter 1 each 0    blood-glucose sensor (DEXCOM G7 SENSOR) Kayla Change sensor every 10 days. 3 each 11    empagliflozin (JARDIANCE) 10 mg tablet Take 1 tablet (10 mg total) by mouth once daily. 90 tablet 0    ergocalciferol (ERGOCALCIFEROL) 50,000 unit Cap Take 1 capsule (50,000 Units total) by mouth every 7 days. 4 capsule 6    hydrALAZINE (APRESOLINE) 100 MG tablet Take 1 tablet (100 mg total) by mouth every 8 (eight) hours. 270 tablet 3    insulin aspart U-100 (NOVOLOG U-100 INSULIN ASPART) 100 unit/mL injection Use in pump, max daily 100 units. 30 mL 11    insulin  cart,aut,G6/7,cntr (OMNIPOD 5 G6-G7 INTRO KT,GEN5,) Crtg Use as directed. 1 each 1    insulin pump cart,auto,BT,G6/7 (OMNIPOD 5 G6-G7 PODS, GEN 5,) Crtg Change every 48 hours. 45 each 3    Lactobacillus rhamnosus GG (CULTURELLE) 10 billion cell capsule Take 1 capsule by mouth once daily. 30 capsule 0    lancets 30 gauge Misc Use to check blood glucose 1 times daily, to use with insurance preferred meter 100 each 3    magnesium oxide (MAG-OX) 400 mg (241.3 mg magnesium) tablet Take 1 tablet (400 mg total) by mouth 2 (two) times daily. 60 tablet 11    meclizine (ANTIVERT) 25 mg tablet Take 1 tablet (25 mg total) by mouth 3 (three) times daily as needed for Dizziness. 21 tablet 3    [Paused] mycophenolate (CELLCEPT) 250 mg Cap Take 2 capsules (500 mg total) by mouth 2 (two) times daily. 120 capsule 11    pen needle, diabetic (BD ULTRA-FINE LO PEN NEEDLE) 32 gauge x 5/32" Ndle USE TO " ADMINISTER INSULIN 4 TIMES DAILY. 400 each 3    polyethylene glycol (MIRALAX) 17 gram PwPk Take 17 gm (mixed in liquid) by mouth every day.      predniSONE (DELTASONE) 5 MG tablet Take 1 tablet (5 mg total) by mouth once daily. 30 tablet 11    rivaroxaban (XARELTO) 15 mg Tab Take 1 tablet (15 mg total) by mouth daily with dinner or evening meal. 30 tablet 11    tacrolimus (PROGRAF) 1 MG Cap Take 1 capsule (1 mg total) by mouth every 12 (twelve) hours. 60 capsule 11    valsartan (DIOVAN) 320 MG tablet Take 1 tablet (320 mg total) by mouth once daily. 90 tablet 3    [DISCONTINUED] doxazosin (CARDURA) 4 MG tablet Take 1 tablet (4 mg total) by mouth once daily. 90 tablet 3    [DISCONTINUED] furosemide (LASIX) 40 MG tablet Take 2 tablets (80 mg total) by mouth daily as needed (for swelling).      [DISCONTINUED] gabapentin (NEURONTIN) 300 MG capsule Take 1 capsule by mouth in the morning, 1 capsule midday, and 3 capsules at night. 450 capsule 3    [DISCONTINUED] hydroCHLOROthiazide 12.5 MG Tab Take 1 tablet (12.5 mg total) by mouth once daily. 30 tablet 11    [DISCONTINUED] insulin aspart U-100 (NOVOLOG) 100 unit/mL (3 mL) InPn pen Inject 10 units under the skin w/ breakfast, 7 units at lunch and dinner. Scale 180-230+2, 231-280+4, 281-330+6, 331-380+8, >380+10.  Max daily 57 units. (Patient not taking: Reported on 4/22/2025) 30 mL 11    [DISCONTINUED] insulin glargine U-100, Lantus, (LANTUS SOLOSTAR U-100 INSULIN) 100 unit/mL (3 mL) InPn pen Inject 20 Units into the skin every morning. (Patient not taking: Reported on 4/22/2025) 15 mL 6    [DISCONTINUED] insulin lispro (HUMALOG KWIKPEN INSULIN) 100 unit/mL pen Inject 18 units w/ breakfast, 16 units w/ lunch and dinner plus scale 150-200 +2, 201-250 +4, 251-300 +6, 301-350 +8. 30 mL 4

## 2025-05-05 NOTE — ASSESSMENT & PLAN NOTE
Creatine stable for now. BMP reviewed- noted Estimated Creatinine Clearance: 28.9 mL/min (A) (based on SCr of 2.8 mg/dL (H)). according to latest data. Based on current GFR, CKD stage is stage 3 - GFR 30-59.  Monitor UOP and serial BMP and adjust therapy as needed. Renally dose meds. Avoid nephrotoxic medications and procedures.    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  hold valsartan for now. monitor BP

## 2025-05-05 NOTE — ASSESSMENT & PLAN NOTE
Anemia is likely due to chronic disease due to Chronic Kidney Disease. Most recent hemoglobin and hematocrit are listed below.  Recent Labs     05/03/25  0237 05/04/25  0233 05/05/25  0513   HGB 8.4* 8.6* 9.0*   HCT 29.1* 29.8* 31.5*     Plan  - Monitor serial CBC: Daily  - Transfuse PRBC if patient becomes hemodynamically unstable, symptomatic or H/H drops below 7/21.  - Patient has not received any PRBC transfusions to date  - Patient's anemia is currently stable

## 2025-05-05 NOTE — ASSESSMENT & PLAN NOTE
Patient has persistent (7 days or more) atrial fibrillation. Patient is currently in atrial fibrillation. [id number] Score: 3. The patients heart rate in the last 24 hours is as follows:  Pulse  Min: 77  Max: 149     Antiarrhythmics       Anticoagulants  rivaroxaban tablet 15 mg, With dinner, Oral    Plan  - Replete lytes with a goal of K>4, Mg >2  - Patient is anticoagulated, see medications listed above.  - Patient's afib is currently controlled

## 2025-05-05 NOTE — ASSESSMENT & PLAN NOTE
Patient has a diagnosis of pneumonia. The cause of the pneumonia is suspected to be bacterial in etiology but organism is not known. The pneumonia is stable. The patient has the following signs/symptoms of pneumonia: cough. The patient does not have a current oxygen requirement and the patient does not have a home oxygen requirement. I have reviewed the pertinent imaging. The following cultures have been collected: Blood cultures and Sputum culture The culture results are listed below.     Current antimicrobial regimen consists of the antibiotics listed below. Will monitor patient closely and continue current treatment plan unchanged.    -Given fevers/chills and recurrent pna, reasonable to continue below abx and deescalate when indicated  -f/u sputum, blood cx    Antibiotics (From admission, onward)      Start     Stop Route Frequency Ordered    05/05/25 0728  ceFEPIme injection 2 g         05/06/25 0729 IV Every 12 hours (non-standard times) 05/05/25 0728    04/30/25 0900  azithromycin (ZITHROMAX) 500 mg in 0.9% NaCl 250 mL IVPB (admixture device)         05/01/25 1259 IV Every 24 hours (non-standard times) 04/29/25 1016          Microbiology Results (last 7 days)       Procedure Component Value Units Date/Time    Blood culture x two cultures. Draw prior to antibiotics. [[id number]]  (Normal) Collected: 04/29/25 0758    Order Status: Completed Specimen: Blood from Peripheral, Forearm, Left Updated: 05/04/25 1801     Blood Culture No Growth After 5 Days    Blood culture x two cultures. Draw prior to antibiotics. [[id number]]  (Normal) Collected: 04/29/25 0758    Order Status: Completed Specimen: Blood from Peripheral, Antecubital, Left Updated: 05/04/25 1801     Blood Culture No Growth After 5 Days    Respiratory Infection Panel (PCR), Nasopharyngeal [[id number]] Collected: 05/02/25 1027    Order Status: Completed Specimen: Nasopharyngeal Swab Updated: 05/02/25 1545     Respiratory Infection Panel Source  Nasopharyngeal Swab     Adenovirus Not Detected     Coronavirus 229E, Common Cold Virus Not Detected     Coronavirus HKU1, Common Cold Virus Not Detected     Coronavirus NL63, Common Cold Virus Not Detected     Coronavirus OC43, Common Cold Virus Not Detected     SARS-CoV2 (COVID-19) Qualitative PCR Not Detected     Human Metapneumovirus Not Detected     Human Rhinovirus/Enterovirus Not Detected     Influenza A Not Detected     Influenza B Not Detected     Parainfluenza Virus 1 Not Detected     Parainfluenza Virus 2 Not Detected     Parainfluenza Virus 3 Not Detected     Parainfluenza Virus 4 Not Detected     Respiratory Syncytial Virus Not Detected     Bordetella Parapertussis (XG0126) Not Detected     Bordetella pertussis (ptxP) Not Detected     Chlamydia pneumoniae Not Detected     Mycoplasma pneumoniae Not Detected    MRSA Screen by PCR [[id number]]     Order Status: Sent Specimen: Nasal Swab     Culture, Respiratory with Gram Stain [[id number]]     Order Status: Sent Specimen: Respiratory     Culture, Respiratory with Gram Stain [[id number]]     Order Status: Sent Specimen: Respiratory     Influenza A & B by Molecular [[id number]]  (Normal) Collected: 04/29/25 0749    Order Status: Completed Specimen: Nasal Swab Updated: 04/29/25 0836     INFLUENZA A MOLECULAR Negative     INFLUENZA B MOLECULAR  Negative          5/1  BC x2 4/29 NGTD. vancomycins discontinued.  respiratory cultures pending.  CT chest ordered -RML GGO concerning for pneumonia possible hiatal hernia and possible anterior vertebral osteophyte near mid esophagus.  SLP consulted  and consider esophogram to assess for hiatal hernia and recurrent aspiration as a cause of repeat pneumonia. continue cefepime. completed azithromycin. denies hemoptysis this admission. last episode 2 weeks back. resumed prograf 1mg BID. continue prednisone. hold cellcept for now     Patient has a diagnosis of pneumonia. The cause of the pneumonia is suspected to be  bacterial in etiology but organism is not known. The pneumonia is stable. The patient has the following signs/symptoms of pneumonia: cough. The patient does not have a current oxygen requirement and the patient does not have a home oxygen requirement. I have reviewed the pertinent imaging. The following cultures have been collected: Blood cultures and Sputum culture The culture results are listed below.     Current antimicrobial regimen consists of the antibiotics listed below. Will monitor patient closely and continue current treatment plan unchanged.    -Given fevers/chills and recurrent pna, reasonable to continue below abx and deescalate when indicated  -f/u sputum, blood cx    Antibiotics (From admission, onward)      Start     Stop Route Frequency Ordered    05/05/25 0728  ceFEPIme injection 2 g         05/06/25 0729 IV Every 12 hours (non-standard times) 05/05/25 0728    04/30/25 0900  azithromycin (ZITHROMAX) 500 mg in 0.9% NaCl 250 mL IVPB (admixture device)         05/01/25 1259 IV Every 24 hours (non-standard times) 04/29/25 1016          Antibiotics (From admission, onward)      Start     Stop Route Frequency Ordered    05/05/25 0728  ceFEPIme injection 2 g         05/06/25 0729 IV Every 12 hours (non-standard times) 05/05/25 0728    04/30/25 0900  azithromycin (ZITHROMAX) 500 mg in 0.9% NaCl 250 mL IVPB (admixture device)         05/01/25 1259 IV Every 24 hours (non-standard times) 04/29/25 1016          5/1  PMH of DM, renal transplant 2016, CHFpEF, AF/AFL (PVI/PWI/CTI 6/2024), CVA, recurrent PNA who presents with fevers, chills.  States that symptoms are similar to last month when he was admitted for pneumonia. No other pna symptoms despite CXR changes.BC x2 4/29 NGTD. vancomycins discontinued.  respiratory cultures pending.  CT chest ordered -RML GGO concerning for pneumonia possible hiatal hernia and possible anterior vertebral osteophyte near mid esophagus.  SLP consulted  and  barium esophogram to  assess for hiatal hernia (second episode of pneumonia since March 2025) and recurrent aspiration as a cause of repeat pneumonia. continue cefepime. completed azithromycin. denies hemoptysis this admission. last episode 2 weeks back. resumed prograf 1mg BID. continue prednisone. hold cellcept for now     Patient has a diagnosis of pneumonia. The cause of the pneumonia is suspected to be bacterial in etiology but organism is not known. The pneumonia is stable. The patient has the following signs/symptoms of pneumonia: cough. The patient does not have a current oxygen requirement and the patient does not have a home oxygen requirement. I have reviewed the pertinent imaging. The following cultures have been collected: Blood cultures and Sputum culture The culture results are listed below.     Current antimicrobial regimen consists of the antibiotics listed below. Will monitor patient closely and continue current treatment plan unchanged.    -Given fevers/chills and recurrent pna, reasonable to continue below abx and deescalate when indicated  -f/u sputum, blood cx    Antibiotics (From admission, onward)      Start     Stop Route Frequency Ordered    05/05/25 0728  ceFEPIme injection 2 g         05/06/25 0729 IV Every 12 hours (non-standard times) 05/05/25 0728    04/30/25 0900  azithromycin (ZITHROMAX) 500 mg in 0.9% NaCl 250 mL IVPB (admixture device)         05/01/25 1259 IV Every 24 hours (non-standard times) 04/29/25 1016          Microbiology Results (last 7 days)       Procedure Component Value Units Date/Time    Blood culture x two cultures. Draw prior to antibiotics. [[id number]]  (Normal) Collected: 04/29/25 0758    Order Status: Completed Specimen: Blood from Peripheral, Forearm, Left Updated: 05/04/25 1801     Blood Culture No Growth After 5 Days    Blood culture x two cultures. Draw prior to antibiotics. [[id number]]  (Normal) Collected: 04/29/25 0758    Order Status: Completed Specimen: Blood from  Peripheral, Antecubital, Left Updated: 05/04/25 1801     Blood Culture No Growth After 5 Days    Respiratory Infection Panel (PCR), Nasopharyngeal [[id number]] Collected: 05/02/25 1027    Order Status: Completed Specimen: Nasopharyngeal Swab Updated: 05/02/25 1545     Respiratory Infection Panel Source Nasopharyngeal Swab     Adenovirus Not Detected     Coronavirus 229E, Common Cold Virus Not Detected     Coronavirus HKU1, Common Cold Virus Not Detected     Coronavirus NL63, Common Cold Virus Not Detected     Coronavirus OC43, Common Cold Virus Not Detected     SARS-CoV2 (COVID-19) Qualitative PCR Not Detected     Human Metapneumovirus Not Detected     Human Rhinovirus/Enterovirus Not Detected     Influenza A Not Detected     Influenza B Not Detected     Parainfluenza Virus 1 Not Detected     Parainfluenza Virus 2 Not Detected     Parainfluenza Virus 3 Not Detected     Parainfluenza Virus 4 Not Detected     Respiratory Syncytial Virus Not Detected     Bordetella Parapertussis ([id number]) Not Detected     Bordetella pertussis (ptxP) Not Detected     Chlamydia pneumoniae Not Detected     Mycoplasma pneumoniae Not Detected    MRSA Screen by PCR [[id number]]     Order Status: Sent Specimen: Nasal Swab     Culture, Respiratory with Gram Stain [[id number]]     Order Status: Sent Specimen: Respiratory     Culture, Respiratory with Gram Stain [[id number]]     Order Status: Sent Specimen: Respiratory     Influenza A & B by Molecular [[id number]]  (Normal) Collected: 04/29/25 0749    Order Status: Completed Specimen: Nasal Swab Updated: 04/29/25 0836     INFLUENZA A MOLECULAR Negative     INFLUENZA B MOLECULAR  Negative          5/1  BC x2 4/29 NGTD. vancomycins discontinued.  respiratory cultures pending.  CT chest ordered -RML GGO concerning for pneumonia possible hiatal hernia and possible anterior vertebral osteophyte near mid esophagus.  SLP consulted  and consider esophogram to assess for hiatal hernia and recurrent  aspiration as a cause of repeat pneumonia. continue cefepime. completed azithromycin. denies hemoptysis this admission. last episode 2 weeks back. resumed prograf 1mg BID. continue prednisone. hold cellcept for now     Patient has a diagnosis of pneumonia. The cause of the pneumonia is suspected to be bacterial in etiology but organism is not known. The pneumonia is stable. The patient has the following signs/symptoms of pneumonia: cough. The patient does not have a current oxygen requirement and the patient does not have a home oxygen requirement. I have reviewed the pertinent imaging. The following cultures have been collected: Blood cultures and Sputum culture The culture results are listed below.     Current antimicrobial regimen consists of the antibiotics listed below. Will monitor patient closely and continue current treatment plan unchanged.    -Given fevers/chills and recurrent pna, reasonable to continue below abx and deescalate when indicated  -f/u sputum, blood cx    Antibiotics (From admission, onward)      Start     Stop Route Frequency Ordered    05/05/25 0728  ceFEPIme injection 2 g         05/06/25 0729 IV Every 12 hours (non-standard times) 05/05/25 0728    04/30/25 0900  azithromycin (ZITHROMAX) 500 mg in 0.9% NaCl 250 mL IVPB (admixture device)         05/01/25 1259 IV Every 24 hours (non-standard times) 04/29/25 1016          Antibiotics (From admission, onward)      Start     Stop Route Frequency Ordered    05/05/25 0728  ceFEPIme injection 2 g         05/06/25 0729 IV Every 12 hours (non-standard times) 05/05/25 0728    04/30/25 0900  azithromycin (ZITHROMAX) 500 mg in 0.9% NaCl 250 mL IVPB (admixture device)         05/01/25 1259 IV Every 24 hours (non-standard times) 04/29/25 1016            Patient has a diagnosis of pneumonia. The cause of the pneumonia is suspected to be bacterial in etiology but organism is not known. The pneumonia is stable. The patient has the following signs/symptoms  of pneumonia: cough. The patient does not have a current oxygen requirement and the patient does not have a home oxygen requirement. I have reviewed the pertinent imaging. The following cultures have been collected: Blood cultures and Sputum culture The culture results are listed below.     Current antimicrobial regimen consists of the antibiotics listed below. Will monitor patient closely and continue current treatment plan unchanged.    -Given fevers/chills and recurrent pna, reasonable to continue below abx and deescalate when indicated  -f/u sputum, blood cx    Antibiotics (From admission, onward)      Start     Stop Route Frequency Ordered    05/05/25 0728  ceFEPIme injection 2 g         05/06/25 0729 IV Every 12 hours (non-standard times) 05/05/25 0728    04/30/25 0900  azithromycin (ZITHROMAX) 500 mg in 0.9% NaCl 250 mL IVPB (admixture device)         05/01/25 1259 IV Every 24 hours (non-standard times) 04/29/25 1016          Microbiology Results (last 7 days)       Procedure Component Value Units Date/Time    Blood culture x two cultures. Draw prior to antibiotics. [[id number]]  (Normal) Collected: 04/29/25 0758    Order Status: Completed Specimen: Blood from Peripheral, Forearm, Left Updated: 05/04/25 1801     Blood Culture No Growth After 5 Days    Blood culture x two cultures. Draw prior to antibiotics. [[id number]]  (Normal) Collected: 04/29/25 0758    Order Status: Completed Specimen: Blood from Peripheral, Antecubital, Left Updated: 05/04/25 1801     Blood Culture No Growth After 5 Days    Respiratory Infection Panel (PCR), Nasopharyngeal [[id number]] Collected: 05/02/25 1027    Order Status: Completed Specimen: Nasopharyngeal Swab Updated: 05/02/25 1545     Respiratory Infection Panel Source Nasopharyngeal Swab     Adenovirus Not Detected     Coronavirus 229E, Common Cold Virus Not Detected     Coronavirus HKU1, Common Cold Virus Not Detected     Coronavirus NL63, Common Cold Virus Not Detected      Coronavirus OC43, Common Cold Virus Not Detected     SARS-CoV2 (COVID-19) Qualitative PCR Not Detected     Human Metapneumovirus Not Detected     Human Rhinovirus/Enterovirus Not Detected     Influenza A Not Detected     Influenza B Not Detected     Parainfluenza Virus 1 Not Detected     Parainfluenza Virus 2 Not Detected     Parainfluenza Virus 3 Not Detected     Parainfluenza Virus 4 Not Detected     Respiratory Syncytial Virus Not Detected     Bordetella Parapertussis (QZ4128) Not Detected     Bordetella pertussis (ptxP) Not Detected     Chlamydia pneumoniae Not Detected     Mycoplasma pneumoniae Not Detected    MRSA Screen by PCR [[id number]]     Order Status: Sent Specimen: Nasal Swab     Culture, Respiratory with Gram Stain [[id number]]     Order Status: Sent Specimen: Respiratory     Culture, Respiratory with Gram Stain [[id number]]     Order Status: Sent Specimen: Respiratory     Influenza A & B by Molecular [[id number]]  (Normal) Collected: 04/29/25 0749    Order Status: Completed Specimen: Nasal Swab Updated: 04/29/25 0836     INFLUENZA A MOLECULAR Negative     INFLUENZA B MOLECULAR  Negative          5/1  BC x2 4/29 NGTD. vancomycins discontinued.  respiratory cultures pending.  CT chest ordered -L GGO concerning for pneumonia possible hiatal hernia and possible anterior vertebral osteophyte near mid esophagus.  SLP consulted  and consider esophogram to assess for hiatal hernia and recurrent aspiration as a cause of repeat pneumonia. continue cefepime. completed azithromycin. denies hemoptysis this admission. last episode 2 weeks back. resumed prograf 1mg BID. continue prednisone. hold cellcept for now     Patient has a diagnosis of pneumonia. The cause of the pneumonia is suspected to be bacterial in etiology but organism is not known. The pneumonia is stable. The patient has the following signs/symptoms of pneumonia: cough. The patient does not have a current oxygen requirement and the patient does  not have a home oxygen requirement. I have reviewed the pertinent imaging. The following cultures have been collected: Blood cultures and Sputum culture The culture results are listed below.     Current antimicrobial regimen consists of the antibiotics listed below. Will monitor patient closely and continue current treatment plan unchanged.    -Given fevers/chills and recurrent pna, reasonable to continue below abx and deescalate when indicated  -f/u sputum, blood cx    Antibiotics (From admission, onward)      Start     Stop Route Frequency Ordered    05/05/25 0728  ceFEPIme injection 2 g         05/06/25 0729 IV Every 12 hours (non-standard times) 05/05/25 0728    04/30/25 0900  azithromycin (ZITHROMAX) 500 mg in 0.9% NaCl 250 mL IVPB (admixture device)         05/01/25 1259 IV Every 24 hours (non-standard times) 04/29/25 1016          Antibiotics (From admission, onward)      Start     Stop Route Frequency Ordered    05/05/25 0728  ceFEPIme injection 2 g         05/06/25 0729 IV Every 12 hours (non-standard times) 05/05/25 0728    04/30/25 0900  azithromycin (ZITHROMAX) 500 mg in 0.9% NaCl 250 mL IVPB (admixture device)         05/01/25 1259 IV Every 24 hours (non-standard times) 04/29/25 1016          5/1  PMH of DM, renal transplant 2016, CHFpEF, AF/AFL (PVI/PWI/CTI 6/2024), CVA, recurrent PNA who presents with fevers, chills.  States that symptoms are similar to last month when he was admitted for pneumonia. No other pna symptoms despite CXR changes.BC x2 4/29 NGTD. vancomycins discontinued.  respiratory cultures pending.  CT chest ordered -RML GGO concerning for pneumonia possible hiatal hernia and possible anterior vertebral osteophyte near mid esophagus.  SLP consulted  and  barium esophogram to assess for hiatal hernia (second episode of pneumonia since March 2025) and recurrent aspiration as a cause of repeat pneumonia. continue cefepime. completed azithromycin. denies hemoptysis this admission. last  episode 2 weeks back. resumed prograf 1mg BID. continue prednisone. hold cellcept for now     Patient has a diagnosis of pneumonia. The cause of the pneumonia is suspected to be bacterial in etiology but organism is not known. The pneumonia is stable. The patient has the following signs/symptoms of pneumonia: cough. The patient does not have a current oxygen requirement and the patient does not have a home oxygen requirement. I have reviewed the pertinent imaging. The following cultures have been collected: Blood cultures and Sputum culture The culture results are listed below.     Current antimicrobial regimen consists of the antibiotics listed below. Will monitor patient closely and continue current treatment plan unchanged.    -Given fevers/chills and recurrent pna, reasonable to continue below abx and deescalate when indicated  -f/u sputum, blood cx    Antibiotics (From admission, onward)      Start     Stop Route Frequency Ordered    05/05/25 0728  ceFEPIme injection 2 g         05/06/25 0729 IV Every 12 hours (non-standard times) 05/05/25 0728    04/30/25 0900  azithromycin (ZITHROMAX) 500 mg in 0.9% NaCl 250 mL IVPB (admixture device)         05/01/25 1259 IV Every 24 hours (non-standard times) 04/29/25 1016          Microbiology Results (last 7 days)       Procedure Component Value Units Date/Time    Blood culture x two cultures. Draw prior to antibiotics. [[id number]]  (Normal) Collected: 04/29/25 0758    Order Status: Completed Specimen: Blood from Peripheral, Forearm, Left Updated: 05/04/25 1801     Blood Culture No Growth After 5 Days    Blood culture x two cultures. Draw prior to antibiotics. [[id number]]  (Normal) Collected: 04/29/25 0758    Order Status: Completed Specimen: Blood from Peripheral, Antecubital, Left Updated: 05/04/25 1801     Blood Culture No Growth After 5 Days    Respiratory Infection Panel (PCR), Nasopharyngeal [[id number]] Collected: 05/02/25 1027    Order Status: Completed  Specimen: Nasopharyngeal Swab Updated: 05/02/25 1545     Respiratory Infection Panel Source Nasopharyngeal Swab     Adenovirus Not Detected     Coronavirus 229E, Common Cold Virus Not Detected     Coronavirus HKU1, Common Cold Virus Not Detected     Coronavirus NL63, Common Cold Virus Not Detected     Coronavirus OC43, Common Cold Virus Not Detected     SARS-CoV2 (COVID-19) Qualitative PCR Not Detected     Human Metapneumovirus Not Detected     Human Rhinovirus/Enterovirus Not Detected     Influenza A Not Detected     Influenza B Not Detected     Parainfluenza Virus 1 Not Detected     Parainfluenza Virus 2 Not Detected     Parainfluenza Virus 3 Not Detected     Parainfluenza Virus 4 Not Detected     Respiratory Syncytial Virus Not Detected     Bordetella Parapertussis (OQ0626) Not Detected     Bordetella pertussis (ptxP) Not Detected     Chlamydia pneumoniae Not Detected     Mycoplasma pneumoniae Not Detected    MRSA Screen by PCR [[id number]]     Order Status: Sent Specimen: Nasal Swab     Culture, Respiratory with Gram Stain [[id number]]     Order Status: Sent Specimen: Respiratory     Culture, Respiratory with Gram Stain [[id number]]     Order Status: Sent Specimen: Respiratory     Influenza A & B by Molecular [[id number]]  (Normal) Collected: 04/29/25 0749    Order Status: Completed Specimen: Nasal Swab Updated: 04/29/25 0836     INFLUENZA A MOLECULAR Negative     INFLUENZA B MOLECULAR  Negative          5/1  BC x2 4/29 NGTD. vancomycins discontinued.  respiratory cultures pending.  CT chest ordered -RML GGO concerning for pneumonia possible hiatal hernia and possible anterior vertebral osteophyte near mid esophagus.  SLP consulted  and consider esophogram to assess for hiatal hernia and recurrent aspiration as a cause of repeat pneumonia. continue cefepime. completed azithromycin. denies hemoptysis this admission. last episode 2 weeks back. resumed prograf 1mg BID. continue prednisone. hold cellcept for now      Patient has a diagnosis of pneumonia. The cause of the pneumonia is suspected to be bacterial in etiology but organism is not known. The pneumonia is stable. The patient has the following signs/symptoms of pneumonia: cough. The patient does not have a current oxygen requirement and the patient does not have a home oxygen requirement. I have reviewed the pertinent imaging. The following cultures have been collected: Blood cultures and Sputum culture The culture results are listed below.     Current antimicrobial regimen consists of the antibiotics listed below. Will monitor patient closely and continue current treatment plan unchanged.    -Given fevers/chills and recurrent pna, reasonable to continue below abx and deescalate when indicated  -f/u sputum, blood cx    Antibiotics (From admission, onward)      Start     Stop Route Frequency Ordered    05/05/25 0728  ceFEPIme injection 2 g         05/06/25 0729 IV Every 12 hours (non-standard times) 05/05/25 0728    04/30/25 0900  azithromycin (ZITHROMAX) 500 mg in 0.9% NaCl 250 mL IVPB (admixture device)         05/01/25 1259 IV Every 24 hours (non-standard times) 04/29/25 1016          Antibiotics (From admission, onward)      Start     Stop Route Frequency Ordered    05/05/25 0728  ceFEPIme injection 2 g         05/06/25 0729 IV Every 12 hours (non-standard times) 05/05/25 0728    04/30/25 0900  azithromycin (ZITHROMAX) 500 mg in 0.9% NaCl 250 mL IVPB (admixture device)         05/01/25 1259 IV Every 24 hours (non-standard times) 04/29/25 1016

## 2025-05-05 NOTE — ASSESSMENT & PLAN NOTE
Patient's FSGs are controlled on current medication regimen.  Last A1c reviewed-   Lab Results   Component Value Date    HGBA1C 8.3 (H) 03/27/2025     Most recent fingerstick glucose reviewed-   Recent Labs   Lab 05/04/25  1650 05/04/25 2055 05/05/25  0816   POCTGLUCOSE 177* 234* 161*     Current correctional scale  Medium  Maintain anti-hyperglycemic dose as follows-   Antihyperglycemics (From admission, onward)      Start     Stop Route Frequency Ordered    05/05/25 0900  insulin glargine U-100 (Lantus) pen 15 Units         -- SubQ Daily 05/04/25 1922    04/29/25 1115  empagliflozin (Jardiance) tablet 10 mg        Question Answer Comment   Does this patient have a diagnosis of heart failure? Yes    Does this patient have type 1 diabetes or diabetic ketoacidosis? No    Does this patient have symptomatic hypotension? No    Is the patient NPO or pending major surgery in next 3 days or less? No        -- Oral Daily 04/29/25 1016    04/29/25 1113  insulin aspart U-100 pen 0-10 Units         -- SubQ Before meals & nightly PRN 04/29/25 1016          Hold Oral hypoglycemics while patient is in the hospital.    Blood glucose acceptable

## 2025-05-05 NOTE — SUBJECTIVE & OBJECTIVE
Subjective:   History of Present Illness:  Ravi has ESRD presumed secondary to DM/HTN post  DDRT 11/5/16. his creatinines were in the 1.9-2 ranging late 2024, peaked at 3.0 2/20/25 and  recently have been 2.2-2.5. He presents with fever and noted to have RLL PNA. sepsis protocol initiated and started on broad spectrum abx. Of note, he was recently hospitalized for PNA as well, although different location.  Home IS: tacrolimus 1/1,  mg bid, prednisone 5 mg.    Mr. Zamorano is a 69 y.o. year old male who is status post Kidney Transplant - 11/5/2016  (#1).    His maintenance immunosuppression consists of:   Immunosuppressants (From admission, onward)      Start     Stop Route Frequency Ordered    05/01/25 0945  tacrolimus capsule 1 mg         -- Oral 2 times daily 05/01/25 0934            Hospital Course:  No notes on file    Interval History:  Seen and examined no new active complains     Past Medical, Surgical, Family, and Social History:   Unchanged from H&P.    Scheduled Meds:   acetaminophen  1,000 mg Oral Q12H    atorvastatin  80 mg Oral Daily    ceFEPime IV (PEDS and ADULTS)  2 g Intravenous Q12H    cloNIDine 0.1 mg/24 hr td ptwk  1 patch Transdermal Q7 Days    doxazosin  8 mg Oral Daily    empagliflozin  10 mg Oral Daily    gabapentin  300 mg Oral Daily    gabapentin  300 mg Oral After lunch    gabapentin  300 mg Oral QHS    hydrALAZINE  100 mg Oral Q8H    insulin glargine U-100  15 Units Subcutaneous Daily    Lactobacillus rhamnosus GG  1 capsule Oral Daily    polyethylene glycol  17 g Oral Daily    predniSONE  5 mg Oral Daily    rivaroxaban  15 mg Oral Daily with dinner    tacrolimus  1 mg Oral BID    torsemide  40 mg Oral Daily    valsartan  320 mg Oral Daily     Continuous Infusions:  PRN Meds:  Current Facility-Administered Medications:     albuterol-ipratropium, 3 mL, Nebulization, Q6H PRN    cloNIDine, 0.1 mg, Oral, Once PRN    dextrose 50%, 12.5 g, Intravenous, PRN    dextrose 50%, 25 g,  "Intravenous, PRN    glucagon (human recombinant), 1 mg, Intramuscular, PRN    glucose, 16 g, Oral, PRN    glucose, 24 g, Oral, PRN    hydrALAZINE, 15 mg, Intravenous, Q6H PRN    insulin aspart U-100, 0-10 Units, Subcutaneous, QID (AC + HS) PRN    meclizine, 25 mg, Oral, TID PRN    melatonin, 6 mg, Oral, Nightly PRN    naloxone, 0.02 mg, Intravenous, PRN    ondansetron, 8 mg, Oral, Q8H PRN    prochlorperazine, 5 mg, Intravenous, Q6H PRN    sodium chloride 0.9%, 10 mL, Intravenous, Q8H PRN    Intake/Output - Last 3 Shifts         05/03 0700  05/04 0659 05/04 0700 05/05 0659 05/05 0700  05/06 0659    P.O. 480      Total Intake(mL/kg) 480 (5.3)      Urine (mL/kg/hr)       Total Output       Net +480                      Review of Systems   Respiratory: Negative.     Genitourinary: Negative.    Musculoskeletal: Negative.       Objective:     Vital Signs (Most Recent):  Temp: 98.1 °F (36.7 °C) (05/05/25 1212)  Pulse: 106 (05/05/25 1212)  Resp: 16 (05/05/25 1212)  BP: (!) 196/86 (05/05/25 1212)  SpO2: 95 % (05/05/25 1212) Vital Signs (24h Range):  Temp:  [97.7 °F (36.5 °C)-98.2 °F (36.8 °C)] 98.1 °F (36.7 °C)  Pulse:  [] 106  Resp:  [16-19] 16  SpO2:  [95 %-99 %] 95 %  BP: (134-196)/(80-90) 196/86     Weight: 90.7 kg (200 lb)  Height: 6' 1" (185.4 cm)  Body mass index is 26.39 kg/m².     Physical Exam  Constitutional:       Appearance: Normal appearance.   Pulmonary:      Effort: Pulmonary effort is normal. No respiratory distress.      Breath sounds: No rhonchi.   Musculoskeletal:      Right lower leg: Edema present.      Left lower leg: Edema present.   Neurological:      General: No focal deficit present.      Mental Status: He is alert and oriented to person, place, and time.   Psychiatric:         Mood and Affect: Mood normal.         Behavior: Behavior normal.          Laboratory:  CBC:   Recent Labs   Lab 05/03/25  0237 05/04/25  0233 05/05/25  0513   WBC 4.08 4.35 5.05   RBC 3.73* 3.85* 4.05*   HGB 8.4* 8.6* " 9.0*   HCT 29.1* 29.8* 31.5*   * 115* 134*   MCV 78* 77* 78*   MCH 22.5* 22.3* 22.2*   MCHC 28.9* 28.9* 28.6*     BMP:   Recent Labs   Lab 05/03/25  1627 05/04/25  0233 05/05/25  0513   * 88 150*    137 138   K 4.1 3.4* 3.9    106 106   CO2 23 20* 22*   BUN 43* 43* 46*   CREATININE 2.8* 2.7* 2.8*   CALCIUM 8.5* 8.3* 8.7       Diagnostic Results:  Chest X-Ray: No results found. However, due to the size of the patient record, not all encounters were searched. Please check Results Review for a complete set of results.  US - Kidney: Results for orders placed during the hospital encounter of 12/04/24    US Transplant Kidney With Doppler    Narrative  EXAMINATION:  US TRANSPLANT KIDNEY WITH DOPPLER    CLINICAL HISTORY:  Kidney transplant status    TECHNIQUE:  Real time gray scale and doppler ultrasound was performed over the patient's renal allograft.    COMPARISON:  U/S kidney transplant 07/11/2022.    FINDINGS:  Patient status-post renal transplant (2016).  The transplant lies in the right lower quadrant and measures 13.3 cm.    The renal transplant demonstrates no focal parenchymal abnormality. No transplant hydronephrosis. No peritransplant fluid.    Renal arterial resistive indices are as follows: Interlobar 0.81, segmental Up 0.81, segmental Mid 0.77, segmental Low 0.76.  Renal arterial resistive indices previously ranged from 0.77 to 0.83.  There is no evidence of a tardus parvus waveform. The maximum velocity in the main renal artery is 175 cm/sec (previously 161 cm/sec).  The maximum velocity in the iliac artery measures 181 cm/sec.  The RA/iliac ratio measures 1.0.  There is no evidence of renal artery stenosis.  The segmental artery resistive index are stable when compared to previous examination of July 22 and relatively similar to the examination of May 2017.    The renal transplant venous system is unremarkable.    Prostate is enlarged measuring 3.1 x 2.9 x 5.0  cm.    Impression  Satisfactory postop status with little change since previous examination 2022 10/20/2017    Prostatomegaly.    Electronically signed by resident: Robert Aggarwal  Date:    12/04/2024  Time:    11:22    Electronically signed by: Lavon Wong MD  Date:    12/04/2024  Time:    11:46

## 2025-05-05 NOTE — PROGRESS NOTES
[facility] - Med S[facility]  Kidney Transplant  Progress Note      Reason for Follow-up: Reassessment of Kidney Transplant - 11/5/2016  (#1) recipient and management of immunosuppression.     ORGAN:   RIGHT KIDNEY    Donor Type:   Donation after Brain Death    PHS Increased Risk:   Cold Ischemia:   Induction Medications:       Subjective:   History of Present Illness:  Ravi has ESRD presumed secondary to DM/HTN post  DDRT 11/5/16. his creatinines were in the 1.9-2 ranging late 2024, peaked at 3.0 2/20/25 and  recently have been 2.2-2.5. He presents with fever and noted to have RLL PNA. sepsis protocol initiated and started on broad spectrum abx. Of note, he was recently hospitalized for PNA as well, although different location.  Home IS: tacrolimus 1/1,  mg bid, prednisone 5 mg.    Mr. Zamorano is a 69 y.o. year old male who is status post Kidney Transplant - 11/5/2016  (#1).    His maintenance immunosuppression consists of:   Immunosuppressants (From admission, onward)      Start     Stop Route Frequency Ordered    05/01/25 0945  tacrolimus capsule 1 mg         -- Oral 2 times daily 05/01/25 0934            Hospital Course:  No notes on file    Interval History:  Seen and examined no new active complains     Past Medical, Surgical, Family, and Social History:   Unchanged from H&P.    Scheduled Meds:   acetaminophen  1,000 mg Oral Q12H    atorvastatin  80 mg Oral Daily    ceFEPime IV (PEDS and ADULTS)  2 g Intravenous Q12H    cloNIDine 0.1 mg/24 hr td ptwk  1 patch Transdermal Q7 Days    doxazosin  8 mg Oral Daily    empagliflozin  10 mg Oral Daily    gabapentin  300 mg Oral Daily    gabapentin  300 mg Oral After lunch    gabapentin  300 mg Oral QHS    hydrALAZINE  100 mg Oral Q8H    insulin glargine U-100  15 Units Subcutaneous Daily    Lactobacillus rhamnosus GG  1 capsule Oral Daily    polyethylene glycol  17 g Oral Daily    predniSONE  5 mg Oral Daily    rivaroxaban  15 mg Oral Daily with dinner    tacrolimus   "1 mg Oral BID    torsemide  40 mg Oral Daily    valsartan  320 mg Oral Daily     Continuous Infusions:  PRN Meds:  Current Facility-Administered Medications:     albuterol-ipratropium, 3 mL, Nebulization, Q6H PRN    cloNIDine, 0.1 mg, Oral, Once PRN    dextrose 50%, 12.5 g, Intravenous, PRN    dextrose 50%, 25 g, Intravenous, PRN    glucagon (human recombinant), 1 mg, Intramuscular, PRN    glucose, 16 g, Oral, PRN    glucose, 24 g, Oral, PRN    hydrALAZINE, 15 mg, Intravenous, Q6H PRN    insulin aspart U-100, 0-10 Units, Subcutaneous, QID (AC + HS) PRN    meclizine, 25 mg, Oral, TID PRN    melatonin, 6 mg, Oral, Nightly PRN    naloxone, 0.02 mg, Intravenous, PRN    ondansetron, 8 mg, Oral, Q8H PRN    prochlorperazine, 5 mg, Intravenous, Q6H PRN    sodium chloride 0.9%, 10 mL, Intravenous, Q8H PRN    Intake/Output - Last 3 Shifts         05/03 0700  05/04 0659 05/04 0700  05/05 0659 05/05 0700  05/06 0659    P.O. 480      Total Intake(mL/kg) 480 (5.3)      Urine (mL/kg/hr)       Total Output       Net +480                      Review of Systems   Respiratory: Negative.     Genitourinary: Negative.    Musculoskeletal: Negative.       Objective:     Vital Signs (Most Recent):  Temp: 98.1 °F (36.7 °C) (05/05/25 1212)  Pulse: 106 (05/05/25 1212)  Resp: 16 (05/05/25 1212)  BP: (!) 196/86 (05/05/25 1212)  SpO2: 95 % (05/05/25 1212) Vital Signs (24h Range):  Temp:  [97.7 °F (36.5 °C)-98.2 °F (36.8 °C)] 98.1 °F (36.7 °C)  Pulse:  [] 106  Resp:  [16-19] 16  SpO2:  [95 %-99 %] 95 %  BP: (134-196)/(80-90) 196/86     Weight: 90.7 kg (200 lb)  Height: 6' 1" (185.4 cm)  Body mass index is 26.39 kg/m².     Physical Exam  Constitutional:       Appearance: Normal appearance.   Pulmonary:      Effort: Pulmonary effort is normal. No respiratory distress.      Breath sounds: No rhonchi.   Musculoskeletal:      Right lower leg: Edema present.      Left lower leg: Edema present.   Neurological:      General: No focal deficit " present.      Mental Status: He is alert and oriented to person, place, and time.   Psychiatric:         Mood and Affect: Mood normal.         Behavior: Behavior normal.          Laboratory:  CBC:   Recent Labs   Lab 05/03/25  0237 05/04/25  0233 05/05/25  0513   WBC 4.08 4.35 5.05   RBC 3.73* 3.85* 4.05*   HGB 8.4* 8.6* 9.0*   HCT 29.1* 29.8* 31.5*   * 115* 134*   MCV 78* 77* 78*   MCH 22.5* 22.3* 22.2*   MCHC 28.9* 28.9* 28.6*     BMP:   Recent Labs   Lab 05/03/25  1627 05/04/25  0233 05/05/25  0513   * 88 150*    137 138   K 4.1 3.4* 3.9    106 106   CO2 23 20* 22*   BUN 43* 43* 46*   CREATININE 2.8* 2.7* 2.8*   CALCIUM 8.5* 8.3* 8.7       Diagnostic Results:  Chest X-Ray: No results found. However, due to the size of the patient record, not all encounters were searched. Please check Results Review for a complete set of results.  US - Kidney: Results for orders placed during the hospital encounter of 12/04/24    US Transplant Kidney With Doppler    Narrative  EXAMINATION:  US TRANSPLANT KIDNEY WITH DOPPLER    CLINICAL HISTORY:  Kidney transplant status    TECHNIQUE:  Real time gray scale and doppler ultrasound was performed over the patient's renal allograft.    COMPARISON:  U/S kidney transplant 07/11/2022.    FINDINGS:  Patient status-post renal transplant (2016).  The transplant lies in the right lower quadrant and measures 13.3 cm.    The renal transplant demonstrates no focal parenchymal abnormality. No transplant hydronephrosis. No peritransplant fluid.    Renal arterial resistive indices are as follows: Interlobar 0.81, segmental Up 0.81, segmental Mid 0.77, segmental Low 0.76.  Renal arterial resistive indices previously ranged from 0.77 to 0.83.  There is no evidence of a tardus parvus waveform. The maximum velocity in the main renal artery is 175 cm/sec (previously 161 cm/sec).  The maximum velocity in the iliac artery measures 181 cm/sec.  The RA/iliac ratio measures 1.0.   There is no evidence of renal artery stenosis.  The segmental artery resistive index are stable when compared to previous examination of July 22 and relatively similar to the examination of May 2017.    The renal transplant venous system is unremarkable.    Prostate is enlarged measuring 3.1 x 2.9 x 5.0 cm.    Impression  Satisfactory postop status with little change since previous examination 2022 10/20/2017    Prostatomegaly.    Electronically signed by resident: Rboert Aggarwal  Date:    12/04/2024  Time:    11:22    Electronically signed by: Lavon Wong MD  Date:    12/04/2024  Time:    11:46  Assessment/Plan:   ESRD s/p cadaveric kidney transplant 2016 secondary to DM and HTN     CKD IV (severe) patient is able to make adequate amount of urine with graft functioning, no urgent indication for dialysis.  -Creat slowly declining asper HPI, currently BL creat runs 2.4 to 2.5, GFR <30 today sCr 2.8   -Follow with strict I/Os, daily weights, BP (goal <140/90), daily labs.    -Avoid nephrotoxic agents (NSAIDs, IV contrast dye, ACEI/ARB anti-HTN medications, Aminoglycoside-containing antibiotics)  -Renally dose all appropriate medications, including antibiotics  Immunosuppressed status  Home meds tacr prednisone and MMF  Continue same dose of Tacro  1mg 1-0-1 BID  Continue Prednisone 5mg   Hold Cellcept      Immunocompromised state due to drug therapy  -See prophylactic immunotherapy.   -Will monitor net IS, as may be contributing to recurrent infections      S/P cadaveric kidney transplant 11/5/2016. ESRD secondary to HTN/DMII  -See CKD      Prophylactic immunotherapy  -Resume home tacro 1/1 and pred 5 mg Qday  -Tacro target 5-7  -Today Tacro level 4.8  -Monitor for toxicities and SE, none noted    Essential HTN   Continue Torsemide valsaratan and hydralazine  Electrolytes   Na 138 k 3.9, C02 22 phs 4.1 mg 1.9   Electrolytes stable   Hypokalemia   K 3.9 today better today (yesterday K 3.4)  Anemia of chronic disease  Hb  9.0   Baseline Hb 8-9       Advised to follow a diet rich in green leafy veggetables    Richelle Bright MD  Kidney Transplant  [facility]

## 2025-05-05 NOTE — PLAN OF CARE
[facility] - Med S[facility]  Discharge Reassessment    Primary Care Provider: Mima Mack MD    Expected Discharge Date: 5/7/2025        Homero met with pt at bedside to discuss dc planning. Pt reported that there are no needs from case management at this time.     Discharge Plan A and Plan B have been determined by review of patient's clinical status, future medical and therapeutic needs, and coverage/benefits for post-acute care in coordination with multidisciplinary team members.    Reassessment (most recent)       Discharge Reassessment - 05/05/25 0860          Discharge Reassessment    Assessment Type Discharge Planning Reassessment (P)      Did the patient's condition or plan change since previous assessment? Yes (P)      Discharge Plan discussed with: Patient (P)      Name(s) and Number(s) Erika Zamorano [phone] (Spouse) (P)      Communicated UBALDO with patient/caregiver Yes (P)      Discharge Plan A Home with family (P)      Discharge Plan B Home (P)      DME Needed Upon Discharge  none (P)      Transition of Care Barriers None (P)      Why the patient remains in the hospital Requires continued medical care (P)         Post-Acute Status    Post-Acute Authorization Other (P)      Coverage [employer] MANAGED MEDICARE - [employer] MEDICARE HMO - CAPITATED (P)      Other Status No Post-Acute Service Needs (P)      Discharge Delays None known at this time (P)                      HOMERO Titus  Case Management  [phone]

## 2025-05-05 NOTE — ASSESSMENT & PLAN NOTE
-Creat slowly declining asper HPI, currently BL creat runs mid 2s, GFR <30  -Baseline Cr 2.4 to 2.5 today sCr 2.8   -Follow with strict I/Os, daily weights, BP (goal <140/90), daily labs.    -Avoid nephrotoxic agents (NSAIDs, IV contrast dye, ACEI/ARB anti-HTN medications, Aminoglycoside-containing antibiotics)  -Renally dose all appropriate medications, including antibiotics

## 2025-05-05 NOTE — ASSESSMENT & PLAN NOTE
"Patient has Diastolic (HFpEF) heart failure that is Chronic. On presentation their CHF was well compensated. Most recent BNP and echo results are listed below.  No results for input(s): "BNP" in the last 72 hours.  Latest ECHO  Results for orders placed during the hospital encounter of 02/20/25    Echo    Interpretation Summary    Left Ventricle: There is mild concentric hypertrophy. There is low normal systolic function with a visually estimated ejection fraction of 50 - 55%. Grade III diastolic dysfunction.    Left Ventricle: The left ventricle is at the upper limits of normal in size. Mildly increased wall thickness. There is mild concentric hypertrophy. Regional wall motion abnormalities present. See diagram for wall motion findings. There is low normal systolic function with a visually estimated ejection fraction of 50 - 55%. Ejection fraction is approximately 53%. Grade III diastolic dysfunction.    Right Ventricle: Systolic function is borderline low despite the low TAPSE.    Left Atrium: Left atrium is moderately dilated.    Aortic Valve: The aortic valve is a trileaflet valve. There is mild aortic valve sclerosis. There is moderate annular calcification present. There is mild aortic regurgitation with a centrally directed jet.    Mitral Valve: There is mild regurgitation with a centrally directed jet.    Pericardium: There is a trivial posterior effusion and under the RA.    Tricuspid Valve: There is mild regurgitation.    Pulmonary Artery: The estimated pulmonary artery systolic pressure is 30 mmHg.    Current Heart Failure Medications  hydrALAZINE tablet 100 mg, Every 8 hours, Oral  empagliflozin (Jardiance) tablet 10 mg, Daily, Oral  hydrALAZINE injection 15 mg, Every 6 hours PRN, Intravenous  torsemide tablet 40 mg, Daily, Oral  valsartan tablet 320 mg, Daily, Oral  torsemide (DEMADEX) tablet, Daily, Oral    Plan  - Monitor strict I&Os and daily weights.    - Place on telemetry  - Low sodium diet  - " Cardiology has not been consulted  - The patient's volume status is at their baseline

## 2025-05-05 NOTE — ASSESSMENT & PLAN NOTE
Patient's most recent potassium results are listed below.   Recent Labs     05/03/25  1627 05/04/25  0233 05/05/25  0513   K 4.1 3.4* 3.9     Plan  - Replete potassium per protocol  - Monitor potassium Daily  - Patient's hypokalemia is stable

## 2025-05-05 NOTE — ASSESSMENT & PLAN NOTE
Patient has a diagnosis of pneumonia. The cause of the pneumonia is suspected to be bacterial in etiology but organism is not known. The pneumonia is stable. The patient has the following signs/symptoms of pneumonia: cough. The patient does not have a current oxygen requirement and the patient does not have a home oxygen requirement. I have reviewed the pertinent imaging. The following cultures have been collected: Blood cultures and Sputum culture The culture results are listed below.     Current antimicrobial regimen consists of the antibiotics listed below. Will monitor patient closely and continue current treatment plan unchanged.    -Given fevers/chills and recurrent pna, reasonable to continue below abx and deescalate when indicated  -f/u sputum, blood cx    Antibiotics (From admission, onward)      Start     Stop Route Frequency Ordered    05/05/25 0728  ceFEPIme injection 2 g         05/06/25 0729 IV Every 12 hours (non-standard times) 05/05/25 0728    04/30/25 0900  azithromycin (ZITHROMAX) 500 mg in 0.9% NaCl 250 mL IVPB (admixture device)         05/01/25 1259 IV Every 24 hours (non-standard times) 04/29/25 1016          Microbiology Results (last 7 days)       Procedure Component Value Units Date/Time    Blood culture x two cultures. Draw prior to antibiotics. [[id number]]  (Normal) Collected: 04/29/25 0758    Order Status: Completed Specimen: Blood from Peripheral, Forearm, Left Updated: 05/04/25 1801     Blood Culture No Growth After 5 Days    Blood culture x two cultures. Draw prior to antibiotics. [[id number]]  (Normal) Collected: 04/29/25 0758    Order Status: Completed Specimen: Blood from Peripheral, Antecubital, Left Updated: 05/04/25 1801     Blood Culture No Growth After 5 Days    Respiratory Infection Panel (PCR), Nasopharyngeal [[id number]] Collected: 05/02/25 1027    Order Status: Completed Specimen: Nasopharyngeal Swab Updated: 05/02/25 1545     Respiratory Infection Panel Source  Nasopharyngeal Swab     Adenovirus Not Detected     Coronavirus 229E, Common Cold Virus Not Detected     Coronavirus HKU1, Common Cold Virus Not Detected     Coronavirus NL63, Common Cold Virus Not Detected     Coronavirus OC43, Common Cold Virus Not Detected     SARS-CoV2 (COVID-19) Qualitative PCR Not Detected     Human Metapneumovirus Not Detected     Human Rhinovirus/Enterovirus Not Detected     Influenza A Not Detected     Influenza B Not Detected     Parainfluenza Virus 1 Not Detected     Parainfluenza Virus 2 Not Detected     Parainfluenza Virus 3 Not Detected     Parainfluenza Virus 4 Not Detected     Respiratory Syncytial Virus Not Detected     Bordetella Parapertussis ([id number]) Not Detected     Bordetella pertussis (ptxP) Not Detected     Chlamydia pneumoniae Not Detected     Mycoplasma pneumoniae Not Detected    MRSA Screen by PCR [[id number]]     Order Status: Sent Specimen: Nasal Swab     Culture, Respiratory with Gram Stain [[id number]]     Order Status: Sent Specimen: Respiratory     Culture, Respiratory with Gram Stain [[id number]]     Order Status: Sent Specimen: Respiratory     Influenza A & B by Molecular [[id number]]  (Normal) Collected: 04/29/25 0749    Order Status: Completed Specimen: Nasal Swab Updated: 04/29/25 0836     INFLUENZA A MOLECULAR Negative     INFLUENZA B MOLECULAR  Negative          5/1  BC x2 4/29 NGTD. vancomycins discontinued.  respiratory cultures pending.  CT chest ordered -RML GGO concerning for pneumonia possible hiatal hernia and possible anterior vertebral osteophyte near mid esophagus.  SLP consulted  and consider esophogram to assess for hiatal hernia and recurrent aspiration as a cause of repeat pneumonia. continue cefepime. completed azithromycin. denies hemoptysis this admission. last episode 2 weeks back. resumed prograf 1mg BID. continue prednisone. hold cellcept for now     Patient has a diagnosis of pneumonia. The cause of the pneumonia is suspected to be  bacterial in etiology but organism is not known. The pneumonia is stable. The patient has the following signs/symptoms of pneumonia: cough. The patient does not have a current oxygen requirement and the patient does not have a home oxygen requirement. I have reviewed the pertinent imaging. The following cultures have been collected: Blood cultures and Sputum culture The culture results are listed below.     Current antimicrobial regimen consists of the antibiotics listed below. Will monitor patient closely and continue current treatment plan unchanged.    -Given fevers/chills and recurrent pna, reasonable to continue below abx and deescalate when indicated  -f/u sputum, blood cx    Antibiotics (From admission, onward)      Start     Stop Route Frequency Ordered    05/05/25 0728  ceFEPIme injection 2 g         05/06/25 0729 IV Every 12 hours (non-standard times) 05/05/25 0728    04/30/25 0900  azithromycin (ZITHROMAX) 500 mg in 0.9% NaCl 250 mL IVPB (admixture device)         05/01/25 1259 IV Every 24 hours (non-standard times) 04/29/25 1016          Antibiotics (From admission, onward)      Start     Stop Route Frequency Ordered    05/05/25 0728  ceFEPIme injection 2 g         05/06/25 0729 IV Every 12 hours (non-standard times) 05/05/25 0728    04/30/25 0900  azithromycin (ZITHROMAX) 500 mg in 0.9% NaCl 250 mL IVPB (admixture device)         05/01/25 1259 IV Every 24 hours (non-standard times) 04/29/25 1016          5/1  PMH of DM, renal transplant 2016, CHFpEF, AF/AFL (PVI/PWI/CTI 6/2024), CVA, recurrent PNA who presents with fevers, chills.  States that symptoms are similar to last month when he was admitted for pneumonia. No other pna symptoms despite CXR changes.BC x2 4/29 NGTD. vancomycins discontinued.  respiratory cultures pending.  CT chest ordered -RML GGO concerning for pneumonia possible hiatal hernia and possible anterior vertebral osteophyte near mid esophagus.  SLP consulted  and  barium esophogram to  assess for hiatal hernia (second episode of pneumonia since March 2025) and recurrent aspiration as a cause of repeat pneumonia. continue cefepime. completed azithromycin. denies hemoptysis this admission. last episode 2 weeks back. resumed prograf 1mg BID. continue prednisone. hold cellcept for now     Patient has a diagnosis of pneumonia. The cause of the pneumonia is suspected to be bacterial in etiology but organism is not known. The pneumonia is stable. The patient has the following signs/symptoms of pneumonia: cough. The patient does not have a current oxygen requirement and the patient does not have a home oxygen requirement. I have reviewed the pertinent imaging. The following cultures have been collected: Blood cultures and Sputum culture The culture results are listed below.     Current antimicrobial regimen consists of the antibiotics listed below. Will monitor patient closely and continue current treatment plan unchanged.    -Given fevers/chills and recurrent pna, reasonable to continue below abx and deescalate when indicated  -f/u sputum, blood cx    Antibiotics (From admission, onward)      Start     Stop Route Frequency Ordered    05/05/25 0728  ceFEPIme injection 2 g         05/06/25 0729 IV Every 12 hours (non-standard times) 05/05/25 0728    04/30/25 0900  azithromycin (ZITHROMAX) 500 mg in 0.9% NaCl 250 mL IVPB (admixture device)         05/01/25 1259 IV Every 24 hours (non-standard times) 04/29/25 1016          Microbiology Results (last 7 days)       Procedure Component Value Units Date/Time    Blood culture x two cultures. Draw prior to antibiotics. [[id number]]  (Normal) Collected: 04/29/25 0758    Order Status: Completed Specimen: Blood from Peripheral, Forearm, Left Updated: 05/04/25 1801     Blood Culture No Growth After 5 Days    Blood culture x two cultures. Draw prior to antibiotics. [[id number]]  (Normal) Collected: 04/29/25 0758    Order Status: Completed Specimen: Blood from  Peripheral, Antecubital, Left Updated: 05/04/25 1801     Blood Culture No Growth After 5 Days    Respiratory Infection Panel (PCR), Nasopharyngeal [[id number]] Collected: 05/02/25 1027    Order Status: Completed Specimen: Nasopharyngeal Swab Updated: 05/02/25 1545     Respiratory Infection Panel Source Nasopharyngeal Swab     Adenovirus Not Detected     Coronavirus 229E, Common Cold Virus Not Detected     Coronavirus HKU1, Common Cold Virus Not Detected     Coronavirus NL63, Common Cold Virus Not Detected     Coronavirus OC43, Common Cold Virus Not Detected     SARS-CoV2 (COVID-19) Qualitative PCR Not Detected     Human Metapneumovirus Not Detected     Human Rhinovirus/Enterovirus Not Detected     Influenza A Not Detected     Influenza B Not Detected     Parainfluenza Virus 1 Not Detected     Parainfluenza Virus 2 Not Detected     Parainfluenza Virus 3 Not Detected     Parainfluenza Virus 4 Not Detected     Respiratory Syncytial Virus Not Detected     Bordetella Parapertussis (SE9114) Not Detected     Bordetella pertussis (ptxP) Not Detected     Chlamydia pneumoniae Not Detected     Mycoplasma pneumoniae Not Detected    MRSA Screen by PCR [[id number]]     Order Status: Sent Specimen: Nasal Swab     Culture, Respiratory with Gram Stain [[id number]]     Order Status: Sent Specimen: Respiratory     Culture, Respiratory with Gram Stain [[id number]]     Order Status: Sent Specimen: Respiratory     Influenza A & B by Molecular [[id number]]  (Normal) Collected: 04/29/25 0749    Order Status: Completed Specimen: Nasal Swab Updated: 04/29/25 0836     INFLUENZA A MOLECULAR Negative     INFLUENZA B MOLECULAR  Negative          5/1  BC x2 4/29 NGTD. vancomycins discontinued.  respiratory cultures pending.  CT chest ordered -RML GGO concerning for pneumonia possible hiatal hernia and possible anterior vertebral osteophyte near mid esophagus.  SLP consulted  and consider esophogram to assess for hiatal hernia and recurrent  aspiration as a cause of repeat pneumonia. continue cefepime. completed azithromycin. denies hemoptysis this admission. last episode 2 weeks back. resumed prograf 1mg BID. continue prednisone. hold cellcept for now     Patient has a diagnosis of pneumonia. The cause of the pneumonia is suspected to be bacterial in etiology but organism is not known. The pneumonia is stable. The patient has the following signs/symptoms of pneumonia: cough. The patient does not have a current oxygen requirement and the patient does not have a home oxygen requirement. I have reviewed the pertinent imaging. The following cultures have been collected: Blood cultures and Sputum culture The culture results are listed below.     Current antimicrobial regimen consists of the antibiotics listed below. Will monitor patient closely and continue current treatment plan unchanged.    -Given fevers/chills and recurrent pna, reasonable to continue below abx and deescalate when indicated  -f/u sputum, blood cx    Antibiotics (From admission, onward)      Start     Stop Route Frequency Ordered    05/05/25 0728  ceFEPIme injection 2 g         05/06/25 0729 IV Every 12 hours (non-standard times) 05/05/25 0728    04/30/25 0900  azithromycin (ZITHROMAX) 500 mg in 0.9% NaCl 250 mL IVPB (admixture device)         05/01/25 1259 IV Every 24 hours (non-standard times) 04/29/25 1016          Antibiotics (From admission, onward)      Start     Stop Route Frequency Ordered    05/05/25 0728  ceFEPIme injection 2 g         05/06/25 0729 IV Every 12 hours (non-standard times) 05/05/25 0728    04/30/25 0900  azithromycin (ZITHROMAX) 500 mg in 0.9% NaCl 250 mL IVPB (admixture device)         05/01/25 1259 IV Every 24 hours (non-standard times) 04/29/25 1016            Patient has a diagnosis of pneumonia. The cause of the pneumonia is suspected to be bacterial in etiology but organism is not known. The pneumonia is stable. The patient has the following signs/symptoms  of pneumonia: cough. The patient does not have a current oxygen requirement and the patient does not have a home oxygen requirement. I have reviewed the pertinent imaging. The following cultures have been collected: Blood cultures and Sputum culture The culture results are listed below.     Current antimicrobial regimen consists of the antibiotics listed below. Will monitor patient closely and continue current treatment plan unchanged.    -Given fevers/chills and recurrent pna, reasonable to continue below abx and deescalate when indicated  -f/u sputum, blood cx    Antibiotics (From admission, onward)      Start     Stop Route Frequency Ordered    05/05/25 0728  ceFEPIme injection 2 g         05/06/25 0729 IV Every 12 hours (non-standard times) 05/05/25 0728    04/30/25 0900  azithromycin (ZITHROMAX) 500 mg in 0.9% NaCl 250 mL IVPB (admixture device)         05/01/25 1259 IV Every 24 hours (non-standard times) 04/29/25 1016          Microbiology Results (last 7 days)       Procedure Component Value Units Date/Time    Blood culture x two cultures. Draw prior to antibiotics. [[id number]]  (Normal) Collected: 04/29/25 0758    Order Status: Completed Specimen: Blood from Peripheral, Forearm, Left Updated: 05/04/25 1801     Blood Culture No Growth After 5 Days    Blood culture x two cultures. Draw prior to antibiotics. [[id number]]  (Normal) Collected: 04/29/25 0758    Order Status: Completed Specimen: Blood from Peripheral, Antecubital, Left Updated: 05/04/25 1801     Blood Culture No Growth After 5 Days    Respiratory Infection Panel (PCR), Nasopharyngeal [[id number]] Collected: 05/02/25 1027    Order Status: Completed Specimen: Nasopharyngeal Swab Updated: 05/02/25 1545     Respiratory Infection Panel Source Nasopharyngeal Swab     Adenovirus Not Detected     Coronavirus 229E, Common Cold Virus Not Detected     Coronavirus HKU1, Common Cold Virus Not Detected     Coronavirus NL63, Common Cold Virus Not Detected      Coronavirus OC43, Common Cold Virus Not Detected     SARS-CoV2 (COVID-19) Qualitative PCR Not Detected     Human Metapneumovirus Not Detected     Human Rhinovirus/Enterovirus Not Detected     Influenza A Not Detected     Influenza B Not Detected     Parainfluenza Virus 1 Not Detected     Parainfluenza Virus 2 Not Detected     Parainfluenza Virus 3 Not Detected     Parainfluenza Virus 4 Not Detected     Respiratory Syncytial Virus Not Detected     Bordetella Parapertussis (RH3161) Not Detected     Bordetella pertussis (ptxP) Not Detected     Chlamydia pneumoniae Not Detected     Mycoplasma pneumoniae Not Detected    MRSA Screen by PCR [[id number]]     Order Status: Sent Specimen: Nasal Swab     Culture, Respiratory with Gram Stain [[id number]]     Order Status: Sent Specimen: Respiratory     Culture, Respiratory with Gram Stain [[id number]]     Order Status: Sent Specimen: Respiratory     Influenza A & B by Molecular [[id number]]  (Normal) Collected: 04/29/25 0749    Order Status: Completed Specimen: Nasal Swab Updated: 04/29/25 0836     INFLUENZA A MOLECULAR Negative     INFLUENZA B MOLECULAR  Negative          5/1  BC x2 4/29 NGTD. vancomycins discontinued.  respiratory cultures pending.  CT chest ordered -L GGO concerning for pneumonia possible hiatal hernia and possible anterior vertebral osteophyte near mid esophagus.  SLP consulted  and consider esophogram to assess for hiatal hernia and recurrent aspiration as a cause of repeat pneumonia. continue cefepime. completed azithromycin. denies hemoptysis this admission. last episode 2 weeks back. resumed prograf 1mg BID. continue prednisone. hold cellcept for now     Patient has a diagnosis of pneumonia. The cause of the pneumonia is suspected to be bacterial in etiology but organism is not known. The pneumonia is stable. The patient has the following signs/symptoms of pneumonia: cough. The patient does not have a current oxygen requirement and the patient does  not have a home oxygen requirement. I have reviewed the pertinent imaging. The following cultures have been collected: Blood cultures and Sputum culture The culture results are listed below.     Current antimicrobial regimen consists of the antibiotics listed below. Will monitor patient closely and continue current treatment plan unchanged.    -Given fevers/chills and recurrent pna, reasonable to continue below abx and deescalate when indicated  -f/u sputum, blood cx    Antibiotics (From admission, onward)      Start     Stop Route Frequency Ordered    05/05/25 0728  ceFEPIme injection 2 g         05/06/25 0729 IV Every 12 hours (non-standard times) 05/05/25 0728    04/30/25 0900  azithromycin (ZITHROMAX) 500 mg in 0.9% NaCl 250 mL IVPB (admixture device)         05/01/25 1259 IV Every 24 hours (non-standard times) 04/29/25 1016          Antibiotics (From admission, onward)      Start     Stop Route Frequency Ordered    05/05/25 0728  ceFEPIme injection 2 g         05/06/25 0729 IV Every 12 hours (non-standard times) 05/05/25 0728    04/30/25 0900  azithromycin (ZITHROMAX) 500 mg in 0.9% NaCl 250 mL IVPB (admixture device)         05/01/25 1259 IV Every 24 hours (non-standard times) 04/29/25 1016          5/1  PMH of DM, renal transplant 2016, CHFpEF, AF/AFL (PVI/PWI/CTI 6/2024), CVA, recurrent PNA who presents with fevers, chills.  States that symptoms are similar to last month when he was admitted for pneumonia. No other pna symptoms despite CXR changes.BC x2 4/29 NGTD. vancomycins discontinued.  respiratory cultures pending.  CT chest ordered -RML GGO concerning for pneumonia possible hiatal hernia and possible anterior vertebral osteophyte near mid esophagus.  SLP consulted  and  barium esophogram to assess for hiatal hernia (second episode of pneumonia since March 2025) and recurrent aspiration as a cause of repeat pneumonia. continue cefepime. completed azithromycin. denies hemoptysis this admission. last  episode 2 weeks back. resumed prograf 1mg BID. continue prednisone. hold cellcept for now     Patient has a diagnosis of pneumonia. The cause of the pneumonia is suspected to be bacterial in etiology but organism is not known. The pneumonia is stable. The patient has the following signs/symptoms of pneumonia: cough. The patient does not have a current oxygen requirement and the patient does not have a home oxygen requirement. I have reviewed the pertinent imaging. The following cultures have been collected: Blood cultures and Sputum culture The culture results are listed below.     Current antimicrobial regimen consists of the antibiotics listed below. Will monitor patient closely and continue current treatment plan unchanged.    -Given fevers/chills and recurrent pna, reasonable to continue below abx and deescalate when indicated  -f/u sputum, blood cx    Antibiotics (From admission, onward)      Start     Stop Route Frequency Ordered    05/05/25 0728  ceFEPIme injection 2 g         05/06/25 0729 IV Every 12 hours (non-standard times) 05/05/25 0728    04/30/25 0900  azithromycin (ZITHROMAX) 500 mg in 0.9% NaCl 250 mL IVPB (admixture device)         05/01/25 1259 IV Every 24 hours (non-standard times) 04/29/25 1016          Microbiology Results (last 7 days)       Procedure Component Value Units Date/Time    Blood culture x two cultures. Draw prior to antibiotics. [[id number]]  (Normal) Collected: 04/29/25 0758    Order Status: Completed Specimen: Blood from Peripheral, Forearm, Left Updated: 05/04/25 1801     Blood Culture No Growth After 5 Days    Blood culture x two cultures. Draw prior to antibiotics. [[id number]]  (Normal) Collected: 04/29/25 0758    Order Status: Completed Specimen: Blood from Peripheral, Antecubital, Left Updated: 05/04/25 1801     Blood Culture No Growth After 5 Days    Respiratory Infection Panel (PCR), Nasopharyngeal [[id number]] Collected: 05/02/25 1027    Order Status: Completed  Specimen: Nasopharyngeal Swab Updated: 05/02/25 1545     Respiratory Infection Panel Source Nasopharyngeal Swab     Adenovirus Not Detected     Coronavirus 229E, Common Cold Virus Not Detected     Coronavirus HKU1, Common Cold Virus Not Detected     Coronavirus NL63, Common Cold Virus Not Detected     Coronavirus OC43, Common Cold Virus Not Detected     SARS-CoV2 (COVID-19) Qualitative PCR Not Detected     Human Metapneumovirus Not Detected     Human Rhinovirus/Enterovirus Not Detected     Influenza A Not Detected     Influenza B Not Detected     Parainfluenza Virus 1 Not Detected     Parainfluenza Virus 2 Not Detected     Parainfluenza Virus 3 Not Detected     Parainfluenza Virus 4 Not Detected     Respiratory Syncytial Virus Not Detected     Bordetella Parapertussis ([id number]) Not Detected     Bordetella pertussis (ptxP) Not Detected     Chlamydia pneumoniae Not Detected     Mycoplasma pneumoniae Not Detected    MRSA Screen by PCR [[id number]]     Order Status: Sent Specimen: Nasal Swab     Culture, Respiratory with Gram Stain [[id number]]     Order Status: Sent Specimen: Respiratory     Culture, Respiratory with Gram Stain [[id number]]     Order Status: Sent Specimen: Respiratory     Influenza A & B by Molecular [[id number]]  (Normal) Collected: 04/29/25 0749    Order Status: Completed Specimen: Nasal Swab Updated: 04/29/25 0836     INFLUENZA A MOLECULAR Negative     INFLUENZA B MOLECULAR  Negative          5/1  BC x2 4/29 NGTD. vancomycins discontinued.  respiratory cultures pending.  CT chest ordered -RML GGO concerning for pneumonia possible hiatal hernia and possible anterior vertebral osteophyte near mid esophagus.  SLP consulted  and consider esophogram to assess for hiatal hernia and recurrent aspiration as a cause of repeat pneumonia. continue cefepime. completed azithromycin. denies hemoptysis this admission. last episode 2 weeks back. resumed prograf 1mg BID. continue prednisone. hold cellcept for now      Patient has a diagnosis of pneumonia. The cause of the pneumonia is suspected to be bacterial in etiology but organism is not known. The pneumonia is stable. The patient has the following signs/symptoms of pneumonia: cough. The patient does not have a current oxygen requirement and the patient does not have a home oxygen requirement. I have reviewed the pertinent imaging. The following cultures have been collected: Blood cultures and Sputum culture The culture results are listed below.     Current antimicrobial regimen consists of the antibiotics listed below. Will monitor patient closely and continue current treatment plan unchanged.    -Given fevers/chills and recurrent pna, reasonable to continue below abx and deescalate when indicated  -f/u sputum, blood cx    Antibiotics (From admission, onward)      Start     Stop Route Frequency Ordered    05/05/25 0728  ceFEPIme injection 2 g         05/06/25 0729 IV Every 12 hours (non-standard times) 05/05/25 0728    04/30/25 0900  azithromycin (ZITHROMAX) 500 mg in 0.9% NaCl 250 mL IVPB (admixture device)         05/01/25 1259 IV Every 24 hours (non-standard times) 04/29/25 1016          Antibiotics (From admission, onward)      Start     Stop Route Frequency Ordered    05/05/25 0728  ceFEPIme injection 2 g         05/06/25 0729 IV Every 12 hours (non-standard times) 05/05/25 0728    04/30/25 0900  azithromycin (ZITHROMAX) 500 mg in 0.9% NaCl 250 mL IVPB (admixture device)         05/01/25 1259 IV Every 24 hours (non-standard times) 04/29/25 1016

## 2025-05-05 NOTE — ASSESSMENT & PLAN NOTE
Patient has a current diagnosis of hypertensive urgency (without evidence of end organ damage) which is uncontrolled.  Latest blood pressure and vitals reviewed-   Temp:  [97.7 °F (36.5 °C)-98.2 °F (36.8 °C)]   Pulse:  []   Resp:  [16-19]   BP: (134-196)/(80-90)   SpO2:  [95 %-99 %] .   Patient currently on IV antihypertensives.   Home meds for hypertension were reviewed and noted below.   Hypertension Medications              doxazosin (CARDURA) 4 MG tablet Take 1 tablet (4 mg total) by mouth once daily.    furosemide (LASIX) 40 MG tablet Take 2 tablets (80 mg total) by mouth daily as needed (for swelling).    hydrALAZINE (APRESOLINE) 100 MG tablet Take 1 tablet (100 mg total) by mouth every 8 (eight) hours.    hydroCHLOROthiazide 12.5 MG Tab Take 1 tablet (12.5 mg total) by mouth once daily.    valsartan (DIOVAN) 320 MG tablet Take 1 tablet (320 mg total) by mouth once daily.            5/3 SBP 200s overnight. started on IV hydralzine. received IV hydralazine 15mg x 2 doses. /84.   5/4 SBP 200s overnight. received hydralazine 15mg x 1 overnight. discussed with KTM. Cr stable. resume valsartan. Clonidine 0.1 mg PRN for SBP > 170mm HG   5/5 SBP 180s overnight. SBP 180s overnight. started on clonidine patch and  eplerenone 50mg daily.

## 2025-05-05 NOTE — ASSESSMENT & PLAN NOTE
-Resume home tacro 1/1 and pred 5 mg Qday  -Tacro target 5-7  -Today Tacro level 4.8  -Monitor for toxicities and SE, none noted

## 2025-05-06 PROBLEM — B99.9 OPPORTUNISTIC INFECTION: Status: ACTIVE | Noted: 2025-05-06

## 2025-05-06 PROBLEM — Z96.41 INSULIN PUMP IN PLACE: Status: ACTIVE | Noted: 2025-05-06

## 2025-05-06 LAB
ABSOLUTE EOSINOPHIL (OHS): 0.16 K/UL
ABSOLUTE MONOCYTE (OHS): 1.1 K/UL (ref 0.3–1)
ABSOLUTE NEUTROPHIL COUNT (OHS): 2.34 K/UL (ref 1.8–7.7)
ANION GAP (OHS): 9 MMOL/L (ref 8–16)
BASOPHILS # BLD AUTO: 0.04 K/UL
BASOPHILS NFR BLD AUTO: 0.8 %
BUN SERPL-MCNC: 45 MG/DL (ref 8–23)
CALCIUM SERPL-MCNC: 8.7 MG/DL (ref 8.7–10.5)
CHLORIDE SERPL-SCNC: 107 MMOL/L (ref 95–110)
CO2 SERPL-SCNC: 22 MMOL/L (ref 23–29)
CREAT SERPL-MCNC: 2.9 MG/DL (ref 0.5–1.4)
ERYTHROCYTE [DISTWIDTH] IN BLOOD BY AUTOMATED COUNT: 19.6 % (ref 11.5–14.5)
GFR SERPLBLD CREATININE-BSD FMLA CKD-EPI: 23 ML/MIN/1.73/M2
GLUCOSE SERPL-MCNC: 67 MG/DL (ref 70–110)
HCT VFR BLD AUTO: 29.6 % (ref 40–54)
HGB BLD-MCNC: 8.6 GM/DL (ref 14–18)
IMM GRANULOCYTES # BLD AUTO: 0.03 K/UL (ref 0–0.04)
IMM GRANULOCYTES NFR BLD AUTO: 0.6 % (ref 0–0.5)
LYMPHOCYTES # BLD AUTO: 1.28 K/UL (ref 1–4.8)
MAGNESIUM SERPL-MCNC: 1.8 MG/DL (ref 1.6–2.6)
MCH RBC QN AUTO: 22.5 PG (ref 27–31)
MCHC RBC AUTO-ENTMCNC: 29.1 G/DL (ref 32–36)
MCV RBC AUTO: 77 FL (ref 82–98)
NUCLEATED RBC (/100WBC) (OHS): 0 /100 WBC
PHOSPHATE SERPL-MCNC: 4.3 MG/DL (ref 2.7–4.5)
PLATELET # BLD AUTO: 137 K/UL (ref 150–450)
PMV BLD AUTO: ABNORMAL FL
POCT GLUCOSE: 115 MG/DL (ref 70–110)
POCT GLUCOSE: 123 MG/DL (ref 70–110)
POCT GLUCOSE: 135 MG/DL (ref 70–110)
POCT GLUCOSE: 172 MG/DL (ref 70–110)
POCT GLUCOSE: 244 MG/DL (ref 70–110)
POCT GLUCOSE: 57 MG/DL (ref 70–110)
POCT GLUCOSE: 64 MG/DL (ref 70–110)
POTASSIUM SERPL-SCNC: 3.4 MMOL/L (ref 3.5–5.1)
RBC # BLD AUTO: 3.83 M/UL (ref 4.6–6.2)
RELATIVE EOSINOPHIL (OHS): 3.2 %
RELATIVE LYMPHOCYTE (OHS): 25.9 % (ref 18–48)
RELATIVE MONOCYTE (OHS): 22.2 % (ref 4–15)
RELATIVE NEUTROPHIL (OHS): 47.3 % (ref 38–73)
SODIUM SERPL-SCNC: 138 MMOL/L (ref 136–145)
TACROLIMUS BLD-MCNC: 3.8 NG/ML (ref 5–15)
WBC # BLD AUTO: 4.95 K/UL (ref 3.9–12.7)

## 2025-05-06 PROCEDURE — 99222 1ST HOSP IP/OBS MODERATE 55: CPT | Mod: HCNC,,,

## 2025-05-06 PROCEDURE — 63600175 PHARM REV CODE 636 W HCPCS: Mod: HCNC

## 2025-05-06 PROCEDURE — 25000003 PHARM REV CODE 250: Mod: HCNC | Performed by: STUDENT IN AN ORGANIZED HEALTH CARE EDUCATION/TRAINING PROGRAM

## 2025-05-06 PROCEDURE — 99233 SBSQ HOSP IP/OBS HIGH 50: CPT | Mod: HCNC,GC,, | Performed by: STUDENT IN AN ORGANIZED HEALTH CARE EDUCATION/TRAINING PROGRAM

## 2025-05-06 PROCEDURE — 83735 ASSAY OF MAGNESIUM: CPT | Mod: HCNC | Performed by: STUDENT IN AN ORGANIZED HEALTH CARE EDUCATION/TRAINING PROGRAM

## 2025-05-06 PROCEDURE — 36415 COLL VENOUS BLD VENIPUNCTURE: CPT | Mod: HCNC | Performed by: STUDENT IN AN ORGANIZED HEALTH CARE EDUCATION/TRAINING PROGRAM

## 2025-05-06 PROCEDURE — 21400001 HC TELEMETRY ROOM: Mod: HCNC

## 2025-05-06 PROCEDURE — 25000003 PHARM REV CODE 250: Mod: HCNC | Performed by: HOSPITALIST

## 2025-05-06 PROCEDURE — 25000003 PHARM REV CODE 250: Mod: HCNC

## 2025-05-06 PROCEDURE — 93923 UPR/LXTR ART STDY 3+ LVLS: CPT | Mod: 26,HCNC,, | Performed by: SURGERY

## 2025-05-06 PROCEDURE — 80048 BASIC METABOLIC PNL TOTAL CA: CPT | Mod: HCNC | Performed by: STUDENT IN AN ORGANIZED HEALTH CARE EDUCATION/TRAINING PROGRAM

## 2025-05-06 PROCEDURE — 80197 ASSAY OF TACROLIMUS: CPT | Mod: HCNC | Performed by: STUDENT IN AN ORGANIZED HEALTH CARE EDUCATION/TRAINING PROGRAM

## 2025-05-06 PROCEDURE — 63600175 PHARM REV CODE 636 W HCPCS: Mod: HCNC | Performed by: INTERNAL MEDICINE

## 2025-05-06 PROCEDURE — 84100 ASSAY OF PHOSPHORUS: CPT | Mod: HCNC | Performed by: STUDENT IN AN ORGANIZED HEALTH CARE EDUCATION/TRAINING PROGRAM

## 2025-05-06 PROCEDURE — 85025 COMPLETE CBC W/AUTO DIFF WBC: CPT | Mod: HCNC | Performed by: STUDENT IN AN ORGANIZED HEALTH CARE EDUCATION/TRAINING PROGRAM

## 2025-05-06 PROCEDURE — 63600175 PHARM REV CODE 636 W HCPCS: Mod: HCNC | Performed by: STUDENT IN AN ORGANIZED HEALTH CARE EDUCATION/TRAINING PROGRAM

## 2025-05-06 RX ORDER — INSULIN ASPART 100 [IU]/ML
0-10 INJECTION, SOLUTION INTRAVENOUS; SUBCUTANEOUS
Status: DISCONTINUED | OUTPATIENT
Start: 2025-05-06 | End: 2025-05-08 | Stop reason: HOSPADM

## 2025-05-06 RX ORDER — INSULIN ASPART 100 [IU]/ML
0.6 INJECTION, SOLUTION INTRAVENOUS; SUBCUTANEOUS CONTINUOUS
Status: DISCONTINUED | OUTPATIENT
Start: 2025-05-06 | End: 2025-05-08 | Stop reason: HOSPADM

## 2025-05-06 RX ORDER — GLUCAGON 1 MG
1 KIT INJECTION
Status: CANCELLED | OUTPATIENT
Start: 2025-05-06

## 2025-05-06 RX ORDER — IBUPROFEN 200 MG
16 TABLET ORAL
Status: DISCONTINUED | OUTPATIENT
Start: 2025-05-06 | End: 2025-05-08 | Stop reason: HOSPADM

## 2025-05-06 RX ORDER — IBUPROFEN 200 MG
24 TABLET ORAL
Status: DISCONTINUED | OUTPATIENT
Start: 2025-05-06 | End: 2025-05-08 | Stop reason: HOSPADM

## 2025-05-06 RX ORDER — GLUCAGON 1 MG
1 KIT INJECTION
Status: DISCONTINUED | OUTPATIENT
Start: 2025-05-06 | End: 2025-05-08 | Stop reason: HOSPADM

## 2025-05-06 RX ORDER — IBUPROFEN 200 MG
24 TABLET ORAL
Status: CANCELLED | OUTPATIENT
Start: 2025-05-06

## 2025-05-06 RX ORDER — IBUPROFEN 200 MG
16 TABLET ORAL
Status: CANCELLED | OUTPATIENT
Start: 2025-05-06

## 2025-05-06 RX ORDER — EPLERENONE 50 MG/1
50 TABLET ORAL DAILY
Qty: 30 TABLET | Refills: 11 | Status: SHIPPED | OUTPATIENT
Start: 2025-05-07 | End: 2026-05-07

## 2025-05-06 RX ADMIN — RIVAROXABAN 15 MG: 15 TABLET, FILM COATED ORAL at 04:05

## 2025-05-06 RX ADMIN — EMPAGLIFLOZIN 10 MG: 10 TABLET, FILM COATED ORAL at 09:05

## 2025-05-06 RX ADMIN — VALSARTAN 320 MG: 160 TABLET, FILM COATED ORAL at 09:05

## 2025-05-06 RX ADMIN — PREDNISONE 5 MG: 5 TABLET ORAL at 09:05

## 2025-05-06 RX ADMIN — EPLERENONE 50 MG: 25 TABLET, FILM COATED ORAL at 10:05

## 2025-05-06 RX ADMIN — Medication 1 CAPSULE: at 09:05

## 2025-05-06 RX ADMIN — POTASSIUM BICARBONATE 25 MEQ: 978 TABLET, EFFERVESCENT ORAL at 01:05

## 2025-05-06 RX ADMIN — DOXAZOSIN 8 MG: 4 TABLET ORAL at 09:05

## 2025-05-06 RX ADMIN — HYDRALAZINE HYDROCHLORIDE 100 MG: 50 TABLET ORAL at 01:05

## 2025-05-06 RX ADMIN — HYDRALAZINE HYDROCHLORIDE 100 MG: 50 TABLET ORAL at 06:05

## 2025-05-06 RX ADMIN — GABAPENTIN 300 MG: 300 CAPSULE ORAL at 09:05

## 2025-05-06 RX ADMIN — HYDRALAZINE HYDROCHLORIDE 100 MG: 50 TABLET ORAL at 09:05

## 2025-05-06 RX ADMIN — ATORVASTATIN CALCIUM 80 MG: 40 TABLET, FILM COATED ORAL at 09:05

## 2025-05-06 RX ADMIN — GABAPENTIN 300 MG: 300 CAPSULE ORAL at 01:05

## 2025-05-06 RX ADMIN — TACROLIMUS 1 MG: 1 CAPSULE ORAL at 06:05

## 2025-05-06 RX ADMIN — ACETAMINOPHEN 1000 MG: 500 TABLET ORAL at 09:05

## 2025-05-06 RX ADMIN — TACROLIMUS 1 MG: 1 CAPSULE ORAL at 09:05

## 2025-05-06 RX ADMIN — INSULIN ASPART 0.6 UNITS/HR: 100 INJECTION, SOLUTION INTRAVENOUS; SUBCUTANEOUS at 02:05

## 2025-05-06 RX ADMIN — TORSEMIDE 40 MG: 20 TABLET ORAL at 09:05

## 2025-05-06 NOTE — ASSESSMENT & PLAN NOTE
5/6  C/o clauadication pain left LE   WENDI -Right Leg: Segmental pressures and PVR waveforms suggest moderate peripheral arterial occlusive disease. Rt Great Toe: The PPG waveform as described above. - TBI of 0.55 with a great toe pressure of 95 mmHg is noted. Left Leg: Segmental pressures and PVR waveforms suggest moderate peripheral arterial occlusive disease. Lt Great Toe: The PPG waveform as described above. - TBI of 0.59 with a great toe pressure of 101 mmHg is noted.   vascular surgery follow up

## 2025-05-06 NOTE — ASSESSMENT & PLAN NOTE
Patient has a diagnosis of pneumonia. The cause of the pneumonia is suspected to be bacterial in etiology but organism is not known. The pneumonia is stable. The patient has the following signs/symptoms of pneumonia: cough. The patient does not have a current oxygen requirement and the patient does not have a home oxygen requirement. I have reviewed the pertinent imaging. The following cultures have been collected: Blood cultures and Sputum culture The culture results are listed below.     Current antimicrobial regimen consists of the antibiotics listed below. Will monitor patient closely and continue current treatment plan unchanged.    -Given fevers/chills and recurrent pna, reasonable to continue below abx and deescalate when indicated  -f/u sputum, blood cx    Antibiotics (From admission, onward)      Start     Stop Route Frequency Ordered    04/30/25 0900  azithromycin (ZITHROMAX) 500 mg in 0.9% NaCl 250 mL IVPB (admixture device)         05/01/25 1259 IV Every 24 hours (non-standard times) 04/29/25 1016          Microbiology Results (last 7 days)       Procedure Component Value Units Date/Time    Blood culture x two cultures. Draw prior to antibiotics. [[id number]]  (Normal) Collected: 04/29/25 0758    Order Status: Completed Specimen: Blood from Peripheral, Forearm, Left Updated: 05/04/25 1801     Blood Culture No Growth After 5 Days    Blood culture x two cultures. Draw prior to antibiotics. [[id number]]  (Normal) Collected: 04/29/25 0758    Order Status: Completed Specimen: Blood from Peripheral, Antecubital, Left Updated: 05/04/25 1801     Blood Culture No Growth After 5 Days    Respiratory Infection Panel (PCR), Nasopharyngeal [[id number]] Collected: 05/02/25 1027    Order Status: Completed Specimen: Nasopharyngeal Swab Updated: 05/02/25 1545     Respiratory Infection Panel Source Nasopharyngeal Swab     Adenovirus Not Detected     Coronavirus 229E, Common Cold Virus Not Detected     Coronavirus HKU1,  Common Cold Virus Not Detected     Coronavirus NL63, Common Cold Virus Not Detected     Coronavirus OC43, Common Cold Virus Not Detected     SARS-CoV2 (COVID-19) Qualitative PCR Not Detected     Human Metapneumovirus Not Detected     Human Rhinovirus/Enterovirus Not Detected     Influenza A Not Detected     Influenza B Not Detected     Parainfluenza Virus 1 Not Detected     Parainfluenza Virus 2 Not Detected     Parainfluenza Virus 3 Not Detected     Parainfluenza Virus 4 Not Detected     Respiratory Syncytial Virus Not Detected     Bordetella Parapertussis ([id number]) Not Detected     Bordetella pertussis (ptxP) Not Detected     Chlamydia pneumoniae Not Detected     Mycoplasma pneumoniae Not Detected    MRSA Screen by PCR [[id number]]     Order Status: Sent Specimen: Nasal Swab     Culture, Respiratory with Gram Stain [[id number]]     Order Status: Sent Specimen: Respiratory           5/1  BC x2 4/29 NGTD. vancomycins discontinued.  respiratory cultures pending.  CT chest ordered -RML GGO concerning for pneumonia possible hiatal hernia and possible anterior vertebral osteophyte near mid esophagus.  SLP consulted  and consider esophogram to assess for hiatal hernia and recurrent aspiration as a cause of repeat pneumonia. continue cefepime. completed azithromycin. denies hemoptysis this admission. last episode 2 weeks back. resumed prograf 1mg BID. continue prednisone. hold cellcept for now     Patient has a diagnosis of pneumonia. The cause of the pneumonia is suspected to be bacterial in etiology but organism is not known. The pneumonia is stable. The patient has the following signs/symptoms of pneumonia: cough. The patient does not have a current oxygen requirement and the patient does not have a home oxygen requirement. I have reviewed the pertinent imaging. The following cultures have been collected: Blood cultures and Sputum culture The culture results are listed below.     Current antimicrobial regimen consists  of the antibiotics listed below. Will monitor patient closely and continue current treatment plan unchanged.    -Given fevers/chills and recurrent pna, reasonable to continue below abx and deescalate when indicated  -f/u sputum, blood cx    Antibiotics (From admission, onward)      Start     Stop Route Frequency Ordered    04/30/25 0900  azithromycin (ZITHROMAX) 500 mg in 0.9% NaCl 250 mL IVPB (admixture device)         05/01/25 1259 IV Every 24 hours (non-standard times) 04/29/25 1016          Antibiotics (From admission, onward)      Start     Stop Route Frequency Ordered    04/30/25 0900  azithromycin (ZITHROMAX) 500 mg in 0.9% NaCl 250 mL IVPB (admixture device)         05/01/25 1259 IV Every 24 hours (non-standard times) 04/29/25 1016          5/1  PMH of DM, renal transplant 2016, CHFpEF, AF/AFL (PVI/PWI/CTI 6/2024), CVA, recurrent PNA who presents with fevers, chills.  States that symptoms are similar to last month when he was admitted for pneumonia. No other pna symptoms despite CXR changes.BC x2 4/29 NGTD. vancomycins discontinued.  respiratory cultures pending.  CT chest ordered -RML GGO concerning for pneumonia possible hiatal hernia and possible anterior vertebral osteophyte near mid esophagus.  SLP consulted  and  barium esophogram to assess for hiatal hernia (second episode of pneumonia since March 2025) and recurrent aspiration as a cause of repeat pneumonia. continue cefepime. completed azithromycin. denies hemoptysis this admission. last episode 2 weeks back. resumed prograf 1mg BID. continue prednisone. hold cellcept for now     Patient has a diagnosis of pneumonia. The cause of the pneumonia is suspected to be bacterial in etiology but organism is not known. The pneumonia is stable. The patient has the following signs/symptoms of pneumonia: cough. The patient does not have a current oxygen requirement and the patient does not have a home oxygen requirement. I have reviewed the pertinent imaging.  The following cultures have been collected: Blood cultures and Sputum culture The culture results are listed below.     Current antimicrobial regimen consists of the antibiotics listed below. Will monitor patient closely and continue current treatment plan unchanged.    -Given fevers/chills and recurrent pna, reasonable to continue below abx and deescalate when indicated  -f/u sputum, blood cx    Antibiotics (From admission, onward)      Start     Stop Route Frequency Ordered    04/30/25 0900  azithromycin (ZITHROMAX) 500 mg in 0.9% NaCl 250 mL IVPB (admixture device)         05/01/25 1259 IV Every 24 hours (non-standard times) 04/29/25 1016          Microbiology Results (last 7 days)       Procedure Component Value Units Date/Time    Blood culture x two cultures. Draw prior to antibiotics. [[id number]]  (Normal) Collected: 04/29/25 0758    Order Status: Completed Specimen: Blood from Peripheral, Forearm, Left Updated: 05/04/25 1801     Blood Culture No Growth After 5 Days    Blood culture x two cultures. Draw prior to antibiotics. [[id number]]  (Normal) Collected: 04/29/25 0758    Order Status: Completed Specimen: Blood from Peripheral, Antecubital, Left Updated: 05/04/25 1801     Blood Culture No Growth After 5 Days    Respiratory Infection Panel (PCR), Nasopharyngeal [[id number]] Collected: 05/02/25 1027    Order Status: Completed Specimen: Nasopharyngeal Swab Updated: 05/02/25 1545     Respiratory Infection Panel Source Nasopharyngeal Swab     Adenovirus Not Detected     Coronavirus 229E, Common Cold Virus Not Detected     Coronavirus HKU1, Common Cold Virus Not Detected     Coronavirus NL63, Common Cold Virus Not Detected     Coronavirus OC43, Common Cold Virus Not Detected     SARS-CoV2 (COVID-19) Qualitative PCR Not Detected     Human Metapneumovirus Not Detected     Human Rhinovirus/Enterovirus Not Detected     Influenza A Not Detected     Influenza B Not Detected     Parainfluenza Virus 1 Not Detected      Parainfluenza Virus 2 Not Detected     Parainfluenza Virus 3 Not Detected     Parainfluenza Virus 4 Not Detected     Respiratory Syncytial Virus Not Detected     Bordetella Parapertussis ([id number]) Not Detected     Bordetella pertussis (ptxP) Not Detected     Chlamydia pneumoniae Not Detected     Mycoplasma pneumoniae Not Detected    MRSA Screen by PCR [[id number]]     Order Status: Sent Specimen: Nasal Swab     Culture, Respiratory with Gram Stain [[id number]]     Order Status: Sent Specimen: Respiratory           5/1  BC x2 4/29 NGTD. vancomycins discontinued.  respiratory cultures pending.  CT chest ordered -L GGO concerning for pneumonia possible hiatal hernia and possible anterior vertebral osteophyte near mid esophagus.  SLP consulted  and consider esophogram to assess for hiatal hernia and recurrent aspiration as a cause of repeat pneumonia. continue cefepime. completed azithromycin. denies hemoptysis this admission. last episode 2 weeks back. resumed prograf 1mg BID. continue prednisone. hold cellcept for now     Patient has a diagnosis of pneumonia. The cause of the pneumonia is suspected to be bacterial in etiology but organism is not known. The pneumonia is stable. The patient has the following signs/symptoms of pneumonia: cough. The patient does not have a current oxygen requirement and the patient does not have a home oxygen requirement. I have reviewed the pertinent imaging. The following cultures have been collected: Blood cultures and Sputum culture The culture results are listed below.     Current antimicrobial regimen consists of the antibiotics listed below. Will monitor patient closely and continue current treatment plan unchanged.    -Given fevers/chills and recurrent pna, reasonable to continue below abx and deescalate when indicated  -f/u sputum, blood cx    Antibiotics (From admission, onward)      Start     Stop Route Frequency Ordered    04/30/25 0900  azithromycin (ZITHROMAX) 500 mg in  0.9% NaCl 250 mL IVPB (admixture device)         05/01/25 1259 IV Every 24 hours (non-standard times) 04/29/25 1016          Antibiotics (From admission, onward)      Start     Stop Route Frequency Ordered    04/30/25 0900  azithromycin (ZITHROMAX) 500 mg in 0.9% NaCl 250 mL IVPB (admixture device)         05/01/25 1259 IV Every 24 hours (non-standard times) 04/29/25 1016            Patient has a diagnosis of pneumonia. The cause of the pneumonia is suspected to be bacterial in etiology but organism is not known. The pneumonia is stable. The patient has the following signs/symptoms of pneumonia: cough. The patient does not have a current oxygen requirement and the patient does not have a home oxygen requirement. I have reviewed the pertinent imaging. The following cultures have been collected: Blood cultures and Sputum culture The culture results are listed below.     Current antimicrobial regimen consists of the antibiotics listed below. Will monitor patient closely and continue current treatment plan unchanged.    -Given fevers/chills and recurrent pna, reasonable to continue below abx and deescalate when indicated  -f/u sputum, blood cx    Antibiotics (From admission, onward)      Start     Stop Route Frequency Ordered    04/30/25 0900  azithromycin (ZITHROMAX) 500 mg in 0.9% NaCl 250 mL IVPB (admixture device)         05/01/25 1259 IV Every 24 hours (non-standard times) 04/29/25 1016          Microbiology Results (last 7 days)       Procedure Component Value Units Date/Time    Blood culture x two cultures. Draw prior to antibiotics. [[id number]]  (Normal) Collected: 04/29/25 0758    Order Status: Completed Specimen: Blood from Peripheral, Forearm, Left Updated: 05/04/25 1801     Blood Culture No Growth After 5 Days    Blood culture x two cultures. Draw prior to antibiotics. [[id number]]  (Normal) Collected: 04/29/25 0758    Order Status: Completed Specimen: Blood from Peripheral, Antecubital, Left Updated:  05/04/25 1801     Blood Culture No Growth After 5 Days    Respiratory Infection Panel (PCR), Nasopharyngeal [[id number]] Collected: 05/02/25 1027    Order Status: Completed Specimen: Nasopharyngeal Swab Updated: 05/02/25 1545     Respiratory Infection Panel Source Nasopharyngeal Swab     Adenovirus Not Detected     Coronavirus 229E, Common Cold Virus Not Detected     Coronavirus HKU1, Common Cold Virus Not Detected     Coronavirus NL63, Common Cold Virus Not Detected     Coronavirus OC43, Common Cold Virus Not Detected     SARS-CoV2 (COVID-19) Qualitative PCR Not Detected     Human Metapneumovirus Not Detected     Human Rhinovirus/Enterovirus Not Detected     Influenza A Not Detected     Influenza B Not Detected     Parainfluenza Virus 1 Not Detected     Parainfluenza Virus 2 Not Detected     Parainfluenza Virus 3 Not Detected     Parainfluenza Virus 4 Not Detected     Respiratory Syncytial Virus Not Detected     Bordetella Parapertussis ([id number]) Not Detected     Bordetella pertussis (ptxP) Not Detected     Chlamydia pneumoniae Not Detected     Mycoplasma pneumoniae Not Detected    MRSA Screen by PCR [[id number]]     Order Status: Sent Specimen: Nasal Swab     Culture, Respiratory with Gram Stain [[id number]]     Order Status: Sent Specimen: Respiratory           5/1  BC x2 4/29 NGTD. vancomycins discontinued.  respiratory cultures pending.  CT chest ordered -RML GGO concerning for pneumonia possible hiatal hernia and possible anterior vertebral osteophyte near mid esophagus.  SLP consulted  and consider esophogram to assess for hiatal hernia and recurrent aspiration as a cause of repeat pneumonia. continue cefepime. completed azithromycin. denies hemoptysis this admission. last episode 2 weeks back. resumed prograf 1mg BID. continue prednisone. hold cellcept for now     Patient has a diagnosis of pneumonia. The cause of the pneumonia is suspected to be bacterial in etiology but organism is not known. The pneumonia  is stable. The patient has the following signs/symptoms of pneumonia: cough. The patient does not have a current oxygen requirement and the patient does not have a home oxygen requirement. I have reviewed the pertinent imaging. The following cultures have been collected: Blood cultures and Sputum culture The culture results are listed below.     Current antimicrobial regimen consists of the antibiotics listed below. Will monitor patient closely and continue current treatment plan unchanged.    -Given fevers/chills and recurrent pna, reasonable to continue below abx and deescalate when indicated  -f/u sputum, blood cx    Antibiotics (From admission, onward)      Start     Stop Route Frequency Ordered    04/30/25 0900  azithromycin (ZITHROMAX) 500 mg in 0.9% NaCl 250 mL IVPB (admixture device)         05/01/25 1259 IV Every 24 hours (non-standard times) 04/29/25 1016          Antibiotics (From admission, onward)      Start     Stop Route Frequency Ordered    04/30/25 0900  azithromycin (ZITHROMAX) 500 mg in 0.9% NaCl 250 mL IVPB (admixture device)         05/01/25 1259 IV Every 24 hours (non-standard times) 04/29/25 1016          5/1  PMH of DM, renal transplant 2016, CHFpEF, AF/AFL (PVI/PWI/CTI 6/2024), CVA, recurrent PNA who presents with fevers, chills.  States that symptoms are similar to last month when he was admitted for pneumonia. No other pna symptoms despite CXR changes.BC x2 4/29 NGTD. vancomycins discontinued.  respiratory cultures pending.  CT chest ordered -RML GGO concerning for pneumonia possible hiatal hernia and possible anterior vertebral osteophyte near mid esophagus.  SLP consulted  and  barium esophogram to assess for hiatal hernia (second episode of pneumonia since March 2025) and recurrent aspiration as a cause of repeat pneumonia. continue cefepime. completed azithromycin. denies hemoptysis this admission. last episode 2 weeks back. resumed prograf 1mg BID. continue prednisone. hold cellcept  for now     Patient has a diagnosis of pneumonia. The cause of the pneumonia is suspected to be bacterial in etiology but organism is not known. The pneumonia is stable. The patient has the following signs/symptoms of pneumonia: cough. The patient does not have a current oxygen requirement and the patient does not have a home oxygen requirement. I have reviewed the pertinent imaging. The following cultures have been collected: Blood cultures and Sputum culture The culture results are listed below.     Current antimicrobial regimen consists of the antibiotics listed below. Will monitor patient closely and continue current treatment plan unchanged.    -Given fevers/chills and recurrent pna, reasonable to continue below abx and deescalate when indicated  -f/u sputum, blood cx    Antibiotics (From admission, onward)      Start     Stop Route Frequency Ordered    04/30/25 0900  azithromycin (ZITHROMAX) 500 mg in 0.9% NaCl 250 mL IVPB (admixture device)         05/01/25 1259 IV Every 24 hours (non-standard times) 04/29/25 1016          Microbiology Results (last 7 days)       Procedure Component Value Units Date/Time    Blood culture x two cultures. Draw prior to antibiotics. [[id number]]  (Normal) Collected: 04/29/25 0758    Order Status: Completed Specimen: Blood from Peripheral, Forearm, Left Updated: 05/04/25 1801     Blood Culture No Growth After 5 Days    Blood culture x two cultures. Draw prior to antibiotics. [[id number]]  (Normal) Collected: 04/29/25 0758    Order Status: Completed Specimen: Blood from Peripheral, Antecubital, Left Updated: 05/04/25 1801     Blood Culture No Growth After 5 Days    Respiratory Infection Panel (PCR), Nasopharyngeal [[id number]] Collected: 05/02/25 1027    Order Status: Completed Specimen: Nasopharyngeal Swab Updated: 05/02/25 1545     Respiratory Infection Panel Source Nasopharyngeal Swab     Adenovirus Not Detected     Coronavirus 229E, Common Cold Virus Not Detected      Coronavirus HKU1, Common Cold Virus Not Detected     Coronavirus NL63, Common Cold Virus Not Detected     Coronavirus OC43, Common Cold Virus Not Detected     SARS-CoV2 (COVID-19) Qualitative PCR Not Detected     Human Metapneumovirus Not Detected     Human Rhinovirus/Enterovirus Not Detected     Influenza A Not Detected     Influenza B Not Detected     Parainfluenza Virus 1 Not Detected     Parainfluenza Virus 2 Not Detected     Parainfluenza Virus 3 Not Detected     Parainfluenza Virus 4 Not Detected     Respiratory Syncytial Virus Not Detected     Bordetella Parapertussis ([id number]) Not Detected     Bordetella pertussis (ptxP) Not Detected     Chlamydia pneumoniae Not Detected     Mycoplasma pneumoniae Not Detected    MRSA Screen by PCR [[id number]]     Order Status: Sent Specimen: Nasal Swab     Culture, Respiratory with Gram Stain [[id number]]     Order Status: Sent Specimen: Respiratory           5/1  BC x2 4/29 NGTD. vancomycins discontinued.  respiratory cultures pending.  CT chest ordered -RML GGO concerning for pneumonia possible hiatal hernia and possible anterior vertebral osteophyte near mid esophagus.  SLP consulted  and consider esophogram to assess for hiatal hernia and recurrent aspiration as a cause of repeat pneumonia. continue cefepime. completed azithromycin. denies hemoptysis this admission. last episode 2 weeks back. resumed prograf 1mg BID. continue prednisone. hold cellcept for now     Patient has a diagnosis of pneumonia. The cause of the pneumonia is suspected to be bacterial in etiology but organism is not known. The pneumonia is stable. The patient has the following signs/symptoms of pneumonia: cough. The patient does not have a current oxygen requirement and the patient does not have a home oxygen requirement. I have reviewed the pertinent imaging. The following cultures have been collected: Blood cultures and Sputum culture The culture results are listed below.     Current antimicrobial  regimen consists of the antibiotics listed below. Will monitor patient closely and continue current treatment plan unchanged.    -Given fevers/chills and recurrent pna, reasonable to continue below abx and deescalate when indicated  -f/u sputum, blood cx    Antibiotics (From admission, onward)      Start     Stop Route Frequency Ordered    04/30/25 0900  azithromycin (ZITHROMAX) 500 mg in 0.9% NaCl 250 mL IVPB (admixture device)         05/01/25 1259 IV Every 24 hours (non-standard times) 04/29/25 1016          Antibiotics (From admission, onward)      Start     Stop Route Frequency Ordered    04/30/25 0900  azithromycin (ZITHROMAX) 500 mg in 0.9% NaCl 250 mL IVPB (admixture device)         05/01/25 1259 IV Every 24 hours (non-standard times) 04/29/25 1016

## 2025-05-06 NOTE — ASSESSMENT & PLAN NOTE
Patient has a current diagnosis of hypertensive urgency (without evidence of end organ damage) which is uncontrolled.  Latest blood pressure and vitals reviewed-   Temp:  [97.5 °F (36.4 °C)-97.9 °F (36.6 °C)]   Pulse:  []   Resp:  [16-20]   BP: (136-178)/(77-92)   SpO2:  [95 %-99 %] .   Patient currently on IV antihypertensives.   Home meds for hypertension were reviewed and noted below.   Hypertension Medications              doxazosin (CARDURA) 4 MG tablet Take 1 tablet (4 mg total) by mouth once daily.    furosemide (LASIX) 40 MG tablet Take 2 tablets (80 mg total) by mouth daily as needed (for swelling).    hydrALAZINE (APRESOLINE) 100 MG tablet Take 1 tablet (100 mg total) by mouth every 8 (eight) hours.    hydroCHLOROthiazide 12.5 MG Tab Take 1 tablet (12.5 mg total) by mouth once daily.    valsartan (DIOVAN) 320 MG tablet Take 1 tablet (320 mg total) by mouth once daily.            5/3 SBP 200s overnight. started on IV hydralzine. received IV hydralazine 15mg x 2 doses. /84.   5/4 SBP 200s overnight. received hydralazine 15mg x 1 overnight. discussed with KTM. Cr stable. resume valsartan. Clonidine 0.1 mg PRN for SBP > 170mm HG   5/5 SBP 180s overnight. SBP 180s overnight. started on clonidine patch and  eplerenone 50mg daily.   5/6 SBP 160s. monitor.

## 2025-05-06 NOTE — ASSESSMENT & PLAN NOTE
Patient has persistent (7 days or more) atrial fibrillation. Patient is currently in atrial fibrillation. [id number] Score: 3. The patients heart rate in the last 24 hours is as follows:  Pulse  Min: 79  Max: 132     Antiarrhythmics       Anticoagulants  rivaroxaban tablet 15 mg, With dinner, Oral    Plan  - Replete lytes with a goal of K>4, Mg >2  - Patient is anticoagulated, see medications listed above.  - Patient's afib is currently controlled

## 2025-05-06 NOTE — SUBJECTIVE & OBJECTIVE
Subjective:   History of Present Illness:  Ravi has ESRD presumed secondary to DM/HTN post  DDRT 11/5/16. his creatinines were in the 1.9-2 ranging late 2024, peaked at 3.0 2/20/25 and  recently have been 2.2-2.5. He presents with fever and noted to have RLL PNA. sepsis protocol initiated and started on broad spectrum abx. Of note, he was recently hospitalized for PNA as well, although different location.  Home IS: tacrolimus 1/1,  mg bid, prednisone 5 mg.    Mr. Zamorano is a 69 y.o. year old male who is status post Kidney Transplant - 11/5/2016  (#1).    His maintenance immunosuppression consists of:   Immunosuppressants (From admission, onward)      Start     Stop Route Frequency Ordered    05/01/25 0945  tacrolimus capsule 1 mg         -- Oral 2 times daily 05/01/25 0934            Hospital Course:  No notes on file    Interval History:  Seen and examined today no active complains     Past Medical, Surgical, Family, and Social History:   Unchanged from H&P.    Scheduled Meds:   acetaminophen  1,000 mg Oral Q12H    atorvastatin  80 mg Oral Daily    cloNIDine 0.1 mg/24 hr td ptwk  1 patch Transdermal Q7 Days    doxazosin  8 mg Oral Daily    empagliflozin  10 mg Oral Daily    eplerenone  50 mg Oral Daily    gabapentin  300 mg Oral Daily    gabapentin  300 mg Oral After lunch    gabapentin  300 mg Oral QHS    hydrALAZINE  100 mg Oral Q8H    Lactobacillus rhamnosus GG  1 capsule Oral Daily    polyethylene glycol  17 g Oral Daily    predniSONE  5 mg Oral Daily    rivaroxaban  15 mg Oral Daily with dinner    tacrolimus  1 mg Oral BID    torsemide  40 mg Oral Daily    valsartan  320 mg Oral Daily     Continuous Infusions:  PRN Meds:  Current Facility-Administered Medications:     albuterol-ipratropium, 3 mL, Nebulization, Q6H PRN    dextrose 50%, 12.5 g, Intravenous, PRN    dextrose 50%, 25 g, Intravenous, PRN    glucagon (human recombinant), 1 mg, Intramuscular, PRN    glucose, 16 g, Oral, PRN    glucose, 24  "g, Oral, PRN    hydrALAZINE, 15 mg, Intravenous, Q6H PRN    meclizine, 25 mg, Oral, TID PRN    melatonin, 6 mg, Oral, Nightly PRN    naloxone, 0.02 mg, Intravenous, PRN    ondansetron, 8 mg, Oral, Q8H PRN    prochlorperazine, 5 mg, Intravenous, Q6H PRN    sodium chloride 0.9%, 10 mL, Intravenous, Q8H PRN    Intake/Output - Last 3 Shifts         05/04 0700 05/05 0659 05/05 0700 05/06 0659 05/06 0700  05/07 0659    P.O.  1227     Total Intake(mL/kg)  1227 (13.5)     Net  +1227            Urine Occurrence  6 x              Review of Systems   Objective:     Vital Signs (Most Recent):  Temp: 97.5 °F (36.4 °C) (05/06/25 1147)  Pulse: 84 (05/06/25 1147)  Resp: 17 (05/06/25 1147)  BP: 136/79 (05/06/25 1147)  SpO2: 99 % (05/06/25 1147) Vital Signs (24h Range):  Temp:  [97.5 °F (36.4 °C)-97.9 °F (36.6 °C)] 97.5 °F (36.4 °C)  Pulse:  [] 84  Resp:  [16-20] 17  SpO2:  [95 %-99 %] 99 %  BP: (136-184)/(77-92) 136/79     Weight: 90.7 kg (200 lb)  Height: 6' 1" (185.4 cm)  Body mass index is 26.39 kg/m².     Physical Exam     Laboratory:  CBC:   Recent Labs   Lab 05/04/25  0233 05/05/25  0513 05/06/25  0644   WBC 4.35 5.05 4.95   RBC 3.85* 4.05* 3.83*   HGB 8.6* 9.0* 8.6*   HCT 29.8* 31.5* 29.6*   * 134* 137*   MCV 77* 78* 77*   MCH 22.3* 22.2* 22.5*   MCHC 28.9* 28.6* 29.1*     CMP:   Recent Labs   Lab 05/03/25  1627 05/04/25  0233 05/05/25  0513 05/06/25  0644   * 88 150* 67*   CALCIUM 8.5* 8.3* 8.7 8.7   ALBUMIN 2.6*  --   --   --     137 138 138   K 4.1 3.4* 3.9 3.4*   CO2 23 20* 22* 22*    106 106 107   BUN 43* 43* 46* 45*   CREATININE 2.8* 2.7* 2.8* 2.9*       Diagnostic Results:  Chest X-Ray: No results found. However, due to the size of the patient record, not all encounters were searched. Please check Results Review for a complete set of results.  US - Kidney: Results for orders placed during the hospital encounter of 12/04/24    US Transplant Kidney With " Doppler    Narrative  EXAMINATION:  US TRANSPLANT KIDNEY WITH DOPPLER    CLINICAL HISTORY:  Kidney transplant status    TECHNIQUE:  Real time gray scale and doppler ultrasound was performed over the patient's renal allograft.    COMPARISON:  U/S kidney transplant 07/11/2022.    FINDINGS:  Patient status-post renal transplant (2016).  The transplant lies in the right lower quadrant and measures 13.3 cm.    The renal transplant demonstrates no focal parenchymal abnormality. No transplant hydronephrosis. No peritransplant fluid.    Renal arterial resistive indices are as follows: Interlobar 0.81, segmental Up 0.81, segmental Mid 0.77, segmental Low 0.76.  Renal arterial resistive indices previously ranged from 0.77 to 0.83.  There is no evidence of a tardus parvus waveform. The maximum velocity in the main renal artery is 175 cm/sec (previously 161 cm/sec).  The maximum velocity in the iliac artery measures 181 cm/sec.  The RA/iliac ratio measures 1.0.  There is no evidence of renal artery stenosis.  The segmental artery resistive index are stable when compared to previous examination of July 22 and relatively similar to the examination of May 2017.    The renal transplant venous system is unremarkable.    Prostate is enlarged measuring 3.1 x 2.9 x 5.0 cm.    Impression  Satisfactory postop status with little change since previous examination 2022 10/20/2017    Prostatomegaly.    Electronically signed by resident: Robert Aggarwal  Date:    12/04/2024  Time:    11:22    Electronically signed by: Lavon Wong MD  Date:    12/04/2024  Time:    11:46

## 2025-05-06 NOTE — ASSESSMENT & PLAN NOTE
"Patient has Diastolic (HFpEF) heart failure that is Chronic. On presentation their CHF was well compensated. Most recent BNP and echo results are listed below.  No results for input(s): "BNP" in the last 72 hours.  Latest ECHO  Results for orders placed during the hospital encounter of 02/20/25    Echo    Interpretation Summary    Left Ventricle: There is mild concentric hypertrophy. There is low normal systolic function with a visually estimated ejection fraction of 50 - 55%. Grade III diastolic dysfunction.    Left Ventricle: The left ventricle is at the upper limits of normal in size. Mildly increased wall thickness. There is mild concentric hypertrophy. Regional wall motion abnormalities present. See diagram for wall motion findings. There is low normal systolic function with a visually estimated ejection fraction of 50 - 55%. Ejection fraction is approximately 53%. Grade III diastolic dysfunction.    Right Ventricle: Systolic function is borderline low despite the low TAPSE.    Left Atrium: Left atrium is moderately dilated.    Aortic Valve: The aortic valve is a trileaflet valve. There is mild aortic valve sclerosis. There is moderate annular calcification present. There is mild aortic regurgitation with a centrally directed jet.    Mitral Valve: There is mild regurgitation with a centrally directed jet.    Pericardium: There is a trivial posterior effusion and under the RA.    Tricuspid Valve: There is mild regurgitation.    Pulmonary Artery: The estimated pulmonary artery systolic pressure is 30 mmHg.    Current Heart Failure Medications  hydrALAZINE tablet 100 mg, Every 8 hours, Oral  empagliflozin (Jardiance) tablet 10 mg, Daily, Oral  hydrALAZINE injection 15 mg, Every 6 hours PRN, Intravenous  torsemide tablet 40 mg, Daily, Oral  valsartan tablet 320 mg, Daily, Oral  torsemide (DEMADEX) tablet, Daily, Oral  eplerenone tablet 50 mg, Daily, Oral    Plan  - Monitor strict I&Os and daily weights.    - Place " on telemetry  - Low sodium diet  - Cardiology has not been consulted  - The patient's volume status is at their baseline

## 2025-05-06 NOTE — PROGRESS NOTES
[facility] - Med Surg  Kidney Transplant  Progress Note      Reason for Follow-up: Reassessment of Kidney Transplant - 11/5/2016  (#1) recipient and management of immunosuppression.     ORGAN:   RIGHT KIDNEY    Donor Type:   Donation after Brain Death    PHS Increased Risk: yes   Cold Ischemia: 314 mins  Induction Medications: Thymoglobulin      Subjective:   History of Present Illness:  Ravi has ESRD presumed secondary to DM/HTN post  DDRT 11/5/16. his creatinines were in the 1.9-2 ranging late 2024, peaked at 3.0 2/20/25 and  recently have been 2.2-2.5. He presents with fever and noted to have RLL PNA. sepsis protocol initiated and started on broad spectrum abx. Of note, he was recently hospitalized for PNA as well, although different location.  Home IS: tacrolimus 1/1,  mg bid, prednisone 5 mg.    Mr. Zamorano is a 69 y.o. year old male who is status post Kidney Transplant - 11/5/2016  (#1).    His maintenance immunosuppression consists of:   Immunosuppressants (From admission, onward)      Start     Stop Route Frequency Ordered    05/01/25 0945  tacrolimus capsule 1 mg         -- Oral 2 times daily 05/01/25 0934            Hospital Course:  No notes on file    Interval History:  Seen and examined today no active complains     Past Medical, Surgical, Family, and Social History:   Unchanged from H&P.    Scheduled Meds:   acetaminophen  1,000 mg Oral Q12H    atorvastatin  80 mg Oral Daily    cloNIDine 0.1 mg/24 hr td ptwk  1 patch Transdermal Q7 Days    doxazosin  8 mg Oral Daily    empagliflozin  10 mg Oral Daily    eplerenone  50 mg Oral Daily    gabapentin  300 mg Oral Daily    gabapentin  300 mg Oral After lunch    gabapentin  300 mg Oral QHS    hydrALAZINE  100 mg Oral Q8H    Lactobacillus rhamnosus GG  1 capsule Oral Daily    polyethylene glycol  17 g Oral Daily    predniSONE  5 mg Oral Daily    rivaroxaban  15 mg Oral Daily with dinner    tacrolimus  1 mg Oral BID    torsemide  40 mg Oral Daily     "valsartan  320 mg Oral Daily     Continuous Infusions:  PRN Meds:  Current Facility-Administered Medications:     albuterol-ipratropium, 3 mL, Nebulization, Q6H PRN    dextrose 50%, 12.5 g, Intravenous, PRN    dextrose 50%, 25 g, Intravenous, PRN    glucagon (human recombinant), 1 mg, Intramuscular, PRN    glucose, 16 g, Oral, PRN    glucose, 24 g, Oral, PRN    hydrALAZINE, 15 mg, Intravenous, Q6H PRN    meclizine, 25 mg, Oral, TID PRN    melatonin, 6 mg, Oral, Nightly PRN    naloxone, 0.02 mg, Intravenous, PRN    ondansetron, 8 mg, Oral, Q8H PRN    prochlorperazine, 5 mg, Intravenous, Q6H PRN    sodium chloride 0.9%, 10 mL, Intravenous, Q8H PRN    Intake/Output - Last 3 Shifts         05/04 0700  05/05 0659 05/05 0700 05/06 0659 05/06 0700  05/07 0659    P.O.  1227     Total Intake(mL/kg)  1227 (13.5)     Net  +1227            Urine Occurrence  6 x              Review of Systems   Objective:     Vital Signs (Most Recent):  Temp: 97.5 °F (36.4 °C) (05/06/25 1147)  Pulse: 84 (05/06/25 1147)  Resp: 17 (05/06/25 1147)  BP: 136/79 (05/06/25 1147)  SpO2: 99 % (05/06/25 1147) Vital Signs (24h Range):  Temp:  [97.5 °F (36.4 °C)-97.9 °F (36.6 °C)] 97.5 °F (36.4 °C)  Pulse:  [] 84  Resp:  [16-20] 17  SpO2:  [95 %-99 %] 99 %  BP: (136-184)/(77-92) 136/79     Weight: 90.7 kg (200 lb)  Height: 6' 1" (185.4 cm)  Body mass index is 26.39 kg/m².     Physical Exam     Laboratory:  CBC:   Recent Labs   Lab 05/04/25  0233 05/05/25  0513 05/06/25  0644   WBC 4.35 5.05 4.95   RBC 3.85* 4.05* 3.83*   HGB 8.6* 9.0* 8.6*   HCT 29.8* 31.5* 29.6*   * 134* 137*   MCV 77* 78* 77*   MCH 22.3* 22.2* 22.5*   MCHC 28.9* 28.6* 29.1*     CMP:   Recent Labs   Lab 05/03/25  1627 05/04/25  0233 05/05/25  0513 05/06/25  0644   * 88 150* 67*   CALCIUM 8.5* 8.3* 8.7 8.7   ALBUMIN 2.6*  --   --   --     137 138 138   K 4.1 3.4* 3.9 3.4*   CO2 23 20* 22* 22*    106 106 107   BUN 43* 43* 46* 45*   CREATININE 2.8* 2.7* 2.8* " 2.9*       Diagnostic Results:  Chest X-Ray: No results found. However, due to the size of the patient record, not all encounters were searched. Please check Results Review for a complete set of results.  US - Kidney: Results for orders placed during the hospital encounter of 12/04/24    US Transplant Kidney With Doppler    Narrative  EXAMINATION:  US TRANSPLANT KIDNEY WITH DOPPLER    CLINICAL HISTORY:  Kidney transplant status    TECHNIQUE:  Real time gray scale and doppler ultrasound was performed over the patient's renal allograft.    COMPARISON:  U/S kidney transplant 07/11/2022.    FINDINGS:  Patient status-post renal transplant (2016).  The transplant lies in the right lower quadrant and measures 13.3 cm.    The renal transplant demonstrates no focal parenchymal abnormality. No transplant hydronephrosis. No peritransplant fluid.    Renal arterial resistive indices are as follows: Interlobar 0.81, segmental Up 0.81, segmental Mid 0.77, segmental Low 0.76.  Renal arterial resistive indices previously ranged from 0.77 to 0.83.  There is no evidence of a tardus parvus waveform. The maximum velocity in the main renal artery is 175 cm/sec (previously 161 cm/sec).  The maximum velocity in the iliac artery measures 181 cm/sec.  The RA/iliac ratio measures 1.0.  There is no evidence of renal artery stenosis.  The segmental artery resistive index are stable when compared to previous examination of July 22 and relatively similar to the examination of May 2017.    The renal transplant venous system is unremarkable.    Prostate is enlarged measuring 3.1 x 2.9 x 5.0 cm.    Impression  Satisfactory postop status with little change since previous examination 2022 10/20/2017    Prostatomegaly.    Electronically signed by resident: Robert Aggarwal  Date:    12/04/2024  Time:    11:22    Electronically signed by: Lavon Wong MD  Date:    12/04/2024  Time:    11:46  Assessment/Plan:       CKD IV (severe)  -kidney function slowly  declining as per HPI,   -Baseline Cr 2.2 to 2.5 today sCr 2.9 from 2.8 yesterday  -Follow with strict I/Os, daily weights, BP (goal <140/90), daily labs.    -Avoid nephrotoxic agents (NSAIDs, IV contrast dye, ACEI/ARB anti-HTN medications, Aminoglycoside-containing antibiotics)  -Renally dose all appropriate medications, including antibiotics  Immunosuppressed status  Tac level 3.8 today   Continue same dose of Tacro  1mg 1-0-1 BID  Continue Prednisone 5mg   Hold Cellcept will resume after one month    Hypertensive urgency  Optimization of blood pressure is essential to prevent end organ damage      Immunocompromised state due to drug therapy  -See prophylactic immunotherapy.   -Will monitor net IS, as may be contributing to recurrent infections      Adverse effect of immunosuppressive drug        S/P cadaveric kidney transplant 11/5/2016. ESRD secondary to HTN/DMII  -See CKD      Prophylactic immunotherapy  -Resume home tacro 1/1 and pred 5 mg Qday  -Tacro target 5-7  -Hold MMF for now will resume after one month  -Today Tacro level 3.8  -Monitor for toxicities and SE, none noted    Electrolyte disorder   Monitor electrolytes and replete if deficient           Richelle Bright MD  Kidney Transplant  [facility] - Med Surg

## 2025-05-06 NOTE — ASSESSMENT & PLAN NOTE
Anemia is likely due to chronic disease due to Chronic Kidney Disease. Most recent hemoglobin and hematocrit are listed below.  Recent Labs     05/04/25  0233 05/05/25  0513 05/06/25  0644   HGB 8.6* 9.0* 8.6*   HCT 29.8* 31.5* 29.6*     Plan  - Monitor serial CBC: Daily  - Transfuse PRBC if patient becomes hemodynamically unstable, symptomatic or H/H drops below 7/21.  - Patient has not received any PRBC transfusions to date  - Patient's anemia is currently stable

## 2025-05-06 NOTE — CONSULTS
[facility] - [facility] Surg  Endocrinology  Diabetes Consult Note    Consult Requested by: Cliff Guaman MD   Reason for admit: Community acquired bacterial pneumonia    HISTORY OF PRESENT ILLNESS:  Reason for Consult: Management of T2DM, Hyperglycemia     Diabetes diagnosis year:      Home Diabetes Medications:    OMNIPOD 5  0.6 u/hr mn-0600 , 9p-mn, 0.7 u/hr 6a-9p   Target 120-130 mg/dl   Iob 3 hours  Max bolus 20 units   Max basal 2 u/hr   Isf 40 mn-mn  Icr 1 to 7.5      Presets:  Snack: 4 units (28g)  Small Meal: 8 units (56g)  Medium Meal: 12 units (90g)  Large Meal: 15 units (114g)  Super Large Meal: 18 units (130g)    How often checking glucose at home? >4 x day   BG readings on regimen: 150s  Hypoglycemia on the regimen?  No  Missed doses on regimen?  No    Diabetes Complications include:     Hyperglycemia    Complicating diabetes co morbidities:   S/p Kidney tx        HPI:   Patient is a 69 y.o. male with history of DM, renal transplant , CHFpEF, AF/AFL (PVI/PWI/CTI 2024), CVA, recurrent PNA who presents with fevers, chills. States that symptoms are similar to last month when he was admitted for pneumonia. Was in his usual state of health until last night when he started experiencing intermittent fevers, chills. Endocrine consulted for BG management.         Interval HPI:   No acute events overnight. Patient in room 632/632 A. Blood glucose variable. BG at, above, and below goal on current insulin regimen (Home Insulin Pump). Steroid use- Prednisone 5 mg QD.      Renal function- Abnormal - 2.9 Cr    Vasopressors-  None     Diet Consistent Carbohydrate 2000 Calories (up to 75 gm per meal)     Eatin%   Nausea: No  Hypoglycemia and intervention: Yes, pt's insulin pump replaced day that he was given basal insulin and also reports over-bolusing for snack   Fever: No  TPN and/or TF: No      PMH, PSH, FH, SH updated and reviewed     ROS    Review of Systems   Gastrointestinal:  Negative for  constipation, diarrhea, nausea and vomiting.   Endocrine: Negative for polydipsia and polyuria.       Current Medications and/or Treatments Impacting Glycemic Control  Immunotherapy:    Immunosuppressants           Stop Route Frequency     tacrolimus capsule 1 mg         -- Oral 2 times daily          Steroids:   Hormones (From admission, onward)      Start     Stop Route Frequency Ordered    04/29/25 1115  predniSONE tablet 5 mg         -- Oral Daily 04/29/25 1016    04/29/25 1112  melatonin tablet 6 mg         -- Oral Nightly PRN 04/29/25 1016          Pressors:    Autonomic Drugs (From admission, onward)      None          Hyperglycemia/Diabetes Medications:   Antihyperglycemics (From admission, onward)      Start     Stop Route Frequency Ordered    04/29/25 1115  empagliflozin (Jardiance) tablet 10 mg        Question Answer Comment   Does this patient have a diagnosis of heart failure? Yes    Does this patient have type 1 diabetes or diabetic ketoacidosis? No    Does this patient have symptomatic hypotension? No    Is the patient NPO or pending major surgery in next 3 days or less? No        -- Oral Daily 04/29/25 1016             PHYSICAL EXAMINATION:  Vitals:    05/06/25 0953   BP:    Pulse: 84   Resp:    Temp:      Body mass index is 26.39 kg/m².     Physical Exam  Constitutional:       General: He is not in acute distress.     Appearance: Normal appearance. He is not ill-appearing.   HENT:      Head: Normocephalic and atraumatic.      Right Ear: External ear normal.      Left Ear: External ear normal.      Nose: Nose normal.   Pulmonary:      Effort: Pulmonary effort is normal. No respiratory distress.   Neurological:      Mental Status: He is alert.   Psychiatric:         Mood and Affect: Mood normal.         Behavior: Behavior normal.            Labs Reviewed and Include   Recent Labs   Lab 05/06/25  0644   GLU 67*   CALCIUM 8.7      K 3.4*   CO2 22*      BUN 45*   CREATININE 2.9*     Lab  "Results   Component Value Date    WBC 4.95 05/06/2025    HGB 8.6 (L) 05/06/2025    HCT 29.6 (L) 05/06/2025    MCV 77 (L) 05/06/2025     (L) 05/06/2025     No results for input(s): "TSH", "FREET4" in the last 168 hours.  Lab Results   Component Value Date    HGBA1C 8.3 (H) 03/27/2025       Nutritional status:   Body mass index is 26.39 kg/m².  Lab Results   Component Value Date    ALBUMIN 2.6 (L) 05/03/2025    ALBUMIN 2.8 (L) 04/29/2025    ALBUMIN 3.1 (L) 04/09/2025     No results found for: "PREALBUMIN"    Estimated Creatinine Clearance: 27.9 mL/min (A) (based on SCr of 2.9 mg/dL (H)).    Accu-Checks  Recent Labs     05/03/25 2059 05/04/25  0817 05/04/25  1207 05/04/25  1650 05/04/25  2055 05/05/25  0816 05/05/25  1210 05/05/25  1605 05/05/25  2219 05/05/25  2305   POCTGLUCOSE 115* 160* 108 177* 234* 161* 234* 221* 57* 123*        ASSESSMENT and PLAN    Pulmonary  * Community acquired bacterial pneumonia  Managed per primary team        Endocrine  Insulin pump in place  At time of evaluation, pt meets criteria to continue home insulin pump usage.  - Has all adequate supplies   - Insulin pump site change on 5-5-2025.    - Bolus settings reviewed    - No changes to home regimen.   - Nurse to check BG qac/hs/0200 & record in epic   - Patient to input glucose into pump and use bolus wizard for prandial needs   - Will continue to monitor accuchecks and titrate insulin as clinically indicated .     - Discussed above plan with patient, patient verbalized understanding.   - Understands in case of pump malfunction or cognitive decline in which pt can no longer safely use insulin pump, will transition to SC MDI          If pump malfunctions or is disconnected, please call endocrine       Type 2 diabetes mellitus with stage 3a chronic kidney disease, with long-term current use of insulin  BG goal: 140-180    - Continue home insulin pump   - POCT Glucose before meals, at bedtime and at 2 am  - Hypoglycemia protocol in " place      ** Please notify Endocrine for any change and/or advance in diet**  ** Please call Endocrine for any BG related issues **     Discharge Planning:   TBD. Please notify endocrinology prior to discharge.            Malina Slaughter PA-C  Endocrinology  [facility] Surg

## 2025-05-06 NOTE — ASSESSMENT & PLAN NOTE
as above     Antibiotics (From admission, onward)      Start     Stop Route Frequency Ordered    04/30/25 0900  azithromycin (ZITHROMAX) 500 mg in 0.9% NaCl 250 mL IVPB (admixture device)         05/01/25 1259 IV Every 24 hours (non-standard times) 04/29/25 1016            Microbiology Results (last 7 days)       Procedure Component Value Units Date/Time    Blood culture x two cultures. Draw prior to antibiotics. [[id number]]  (Normal) Collected: 04/29/25 0758    Order Status: Completed Specimen: Blood from Peripheral, Forearm, Left Updated: 05/04/25 1801     Blood Culture No Growth After 5 Days    Blood culture x two cultures. Draw prior to antibiotics. [[id number]]  (Normal) Collected: 04/29/25 0758    Order Status: Completed Specimen: Blood from Peripheral, Antecubital, Left Updated: 05/04/25 1801     Blood Culture No Growth After 5 Days    Respiratory Infection Panel (PCR), Nasopharyngeal [[id number]] Collected: 05/02/25 1027    Order Status: Completed Specimen: Nasopharyngeal Swab Updated: 05/02/25 1545     Respiratory Infection Panel Source Nasopharyngeal Swab     Adenovirus Not Detected     Coronavirus 229E, Common Cold Virus Not Detected     Coronavirus HKU1, Common Cold Virus Not Detected     Coronavirus NL63, Common Cold Virus Not Detected     Coronavirus OC43, Common Cold Virus Not Detected     SARS-CoV2 (COVID-19) Qualitative PCR Not Detected     Human Metapneumovirus Not Detected     Human Rhinovirus/Enterovirus Not Detected     Influenza A Not Detected     Influenza B Not Detected     Parainfluenza Virus 1 Not Detected     Parainfluenza Virus 2 Not Detected     Parainfluenza Virus 3 Not Detected     Parainfluenza Virus 4 Not Detected     Respiratory Syncytial Virus Not Detected     Bordetella Parapertussis (AU5163) Not Detected     Bordetella pertussis (ptxP) Not Detected     Chlamydia pneumoniae Not Detected     Mycoplasma pneumoniae Not Detected

## 2025-05-06 NOTE — ASSESSMENT & PLAN NOTE
-kidney function slowly declining as per HPI,   -Baseline Cr 2.2 to 2.5 today sCr 2.9 from 2.8 yesterday  -Follow with strict I/Os, daily weights, BP (goal <140/90), daily labs.    -Avoid nephrotoxic agents (NSAIDs, IV contrast dye, ACEI/ARB anti-HTN medications, Aminoglycoside-containing antibiotics)  -Renally dose all appropriate medications, including antibiotics

## 2025-05-06 NOTE — ASSESSMENT & PLAN NOTE
Creatine stable for now. BMP reviewed- noted Estimated Creatinine Clearance: 27.9 mL/min (A) (based on SCr of 2.9 mg/dL (H)). according to latest data. Based on current GFR, CKD stage is stage 3 - GFR 30-59.  Monitor UOP and serial BMP and adjust therapy as needed. Renally dose meds. Avoid nephrotoxic medications and procedures.    Recent Labs   Lab 05/04/25  0233 05/05/25  0513 05/06/25  0644   BUN 43* 46* 45*   CREATININE 2.7* 2.8* 2.9*       I & O     Intake/Output Summary (Last 24 hours) at 5/6/2025 1536  Last data filed at 5/6/2025 0616  Gross per 24 hour   Intake 702 ml   Output --   Net 702 ml        5/3 KTM f/u - Cr elevated from his baseline - suspicion for failing allograft and approaching need for dialysis .  hold valsartan for now. monitor BP

## 2025-05-06 NOTE — ASSESSMENT & PLAN NOTE
-Resume home tacro 1/1 and pred 5 mg Qday  -Tacro target 5-7  -Hold MMF for now will resume after one month  -Today Tacro level 3.8  -Monitor for toxicities and SE, none noted

## 2025-05-06 NOTE — SUBJECTIVE & OBJECTIVE
Interval HPI:   No acute events overnight. Patient in room 632/632 A. Blood glucose variable. BG at, above, and below goal on current insulin regimen (Home Insulin Pump). Steroid use- Prednisone 5 mg QD.      Renal function- Abnormal - 2.9 Cr    Vasopressors-  None     Diet Consistent Carbohydrate 2000 Calories (up to 75 gm per meal)     Eatin%   Nausea: No  Hypoglycemia and intervention: Yes, pt's insulin pump replaced day that he was given basal insulin.   Fever: No  TPN and/or TF: No      PMH, PSH, FH, SH updated and reviewed     ROS    Review of Systems   Gastrointestinal:  Negative for constipation, diarrhea, nausea and vomiting.   Endocrine: Negative for polydipsia and polyuria.       Current Medications and/or Treatments Impacting Glycemic Control  Immunotherapy:    Immunosuppressants           Stop Route Frequency     tacrolimus capsule 1 mg         -- Oral 2 times daily          Steroids:   Hormones (From admission, onward)      Start     Stop Route Frequency Ordered    25 1115  predniSONE tablet 5 mg         -- Oral Daily 25 1016    25 1112  melatonin tablet 6 mg         -- Oral Nightly PRN 25 1016          Pressors:    Autonomic Drugs (From admission, onward)      None          Hyperglycemia/Diabetes Medications:   Antihyperglycemics (From admission, onward)      Start     Stop Route Frequency Ordered    25 1115  empagliflozin (Jardiance) tablet 10 mg        Question Answer Comment   Does this patient have a diagnosis of heart failure? Yes    Does this patient have type 1 diabetes or diabetic ketoacidosis? No    Does this patient have symptomatic hypotension? No    Is the patient NPO or pending major surgery in next 3 days or less? No        -- Oral Daily 25 1016             PHYSICAL EXAMINATION:  Vitals:    25 0953   BP:    Pulse: 84   Resp:    Temp:      Body mass index is 26.39 kg/m².     Physical Exam  Constitutional:       General: He is not in acute  distress.     Appearance: Normal appearance. He is not ill-appearing.   HENT:      Head: Normocephalic and atraumatic.      Right Ear: External ear normal.      Left Ear: External ear normal.      Nose: Nose normal.   Pulmonary:      Effort: Pulmonary effort is normal. No respiratory distress.   Neurological:      Mental Status: He is alert.   Psychiatric:         Mood and Affect: Mood normal.         Behavior: Behavior normal.

## 2025-05-06 NOTE — PLAN OF CARE
Pt's wife brought his continuous glucose device and replaced it. Additional Novolog coverage not needed for dinner as pt is able to cover himself w/insulin pump. BP continues to be hypertensive although it had improved towards end of day shift. Hypertensive medications added and adjusted throughout the day. Pt continues to receive IV antibiotics.   Problem: Infection  Goal: Absence of Infection Signs and Symptoms  Outcome: Progressing  Intervention: Prevent or Manage Infection  Flowsheets (Taken 5/5/2025 1900)  Fever Reduction/Comfort Measures: lightweight bedding  Infection Management: aseptic technique maintained  Isolation Precautions: protective     Problem: Pneumonia  Goal: Fluid Balance  Outcome: Progressing  Intervention: Monitor and Manage Fluid Balance  Flowsheets (Taken 5/5/2025 1900)  Fluid/Electrolyte Management: oral rehydration therapy initiated  Goal: Resolution of Infection Signs and Symptoms  Outcome: Progressing  Intervention: Prevent Infection Progression  Flowsheets (Taken 5/5/2025 1900)  Fever Reduction/Comfort Measures: lightweight bedding  Infection Management: aseptic technique maintained  Isolation Precautions: protective  Goal: Effective Oxygenation and Ventilation  Outcome: Progressing  Intervention: Promote Airway Secretion Clearance  Flowsheets (Taken 5/5/2025 1900)  Breathing Techniques/Airway Clearance: deep/controlled cough encouraged  Cough And Deep Breathing: done independently per patient  Intervention: Optimize Oxygenation and Ventilation  Flowsheets (Taken 5/5/2025 1900)  Airway/Ventilation Management: airway patency maintained  Head of Bed (HOB) Positioning: HOB elevated     Problem: Diabetes Comorbidity  Goal: Blood Glucose Level Within Targeted Range  Outcome: Progressing  Intervention: Monitor and Manage Glycemia  Flowsheets (Taken 5/5/2025 1900)  Glycemic Management:   blood glucose monitored   supplemental insulin given     Problem: Hypertension Acute  Goal: Blood Pressure  Within Desired Range  Outcome: Progressing  Intervention: Normalize Blood Pressure  Flowsheets (Taken 5/5/2025 1900)  Sensory Stimulation Regulation:   quiet environment promoted   care clustered   television on  Medication Review/Management:   medications reviewed   high-risk medications identified  Intervention: Provide Surveillance and Support  Flowsheets (Taken 5/5/2025 1900)  Stabilization Measures: legs elevated

## 2025-05-06 NOTE — ASSESSMENT & PLAN NOTE
At time of evaluation, pt meets criteria to continue home insulin pump usage.  - Has all adequate supplies   - Insulin pump site change on 5-5-2025.    - Bolus settings reviewed    - No changes to home regimen.   - Nurse to check BG qac/hs/0200 & record in epic   - Patient to input glucose into pump and use bolus wizard for prandial needs   - Will continue to monitor accuchecks and titrate insulin as clinically indicated .     - Discussed above plan with patient, patient verbalized understanding.   - Understands in case of pump malfunction or cognitive decline in which pt can no longer safely use insulin pump, will transition to SC MDI          If pump malfunctions or is disconnected, please call endocrine

## 2025-05-06 NOTE — HPI
Reason for Consult: Management of T2DM, Hyperglycemia     Diabetes diagnosis year: 2000     Home Diabetes Medications:    OMNIPOD 5  0.6 u/hr mn-0600 , 9p-mn, 0.7 u/hr 6a-9p   Target 120-130 mg/dl   Iob 3 hours  Max bolus 20 units   Max basal 2 u/hr   Isf 40 mn-mn  Icr 1 to 7.5      Presets:  Snack: 4 units (28g)  Small Meal: 8 units (56g)  Medium Meal: 12 units (90g)  Large Meal: 15 units (114g)  Super Large Meal: 18 units (130g)    How often checking glucose at home? >4 x day   BG readings on regimen: 150s  Hypoglycemia on the regimen?  No  Missed doses on regimen?  No    Diabetes Complications include:     Hyperglycemia    Complicating diabetes co morbidities:   S/p Kidney tx 2016       HPI:   Patient is a 69 y.o. male with history of DM, renal transplant 2016, CHFpEF, AF/AFL (PVI/PWI/CTI 6/2024), CVA, recurrent PNA who presents with fevers, chills. States that symptoms are similar to last month when he was admitted for pneumonia. Was in his usual state of health until last night when he started experiencing intermittent fevers, chills. Endocrine consulted for BG management.

## 2025-05-06 NOTE — PROGRESS NOTES
[facility] Medicine  Progress Note    Patient Name: Ravi Zamorano  MRN: [id number]  Patient Class: IP- Inpatient   Admission Date: 4/29/2025  Length of Stay: 7 days  Attending Physician: Cliff Guaman MD  Primary Care Provider: Mima Mack MD        Subjective     Principal Problem:Community acquired bacterial pneumonia        HPI:  This is a 69-year-old male with a history of DM, renal transplant 2016, CHFpEF, AF/AFL (PVI/PWI/CTI 6/2024), CVA, recurrent PNA who presents with fevers, chills.  States that symptoms are similar to last month when he was admitted for pneumonia.  Was in his usual state of health until last night when he started experiencing intermittent fevers, chills.  Wife states she also noticed that he has had increased intermittent cough over the past day or so.  Patient did not notice this.  Denies any production.  Also denies any shortness for breath, wheezing, chest pain, abdominal pain, diarrhea, nausea, vomiting, dysuria.  Compliant with all medications.  Trace lower extremity swelling is chronic.    ED course:  On room air.  Lab work significant for Cr 2.7, same as previous. CXR showed development of RLL consolidation.  Received IV vancomycin, azithromycin, Zosyn.  Septic workup obtained.    Overview/Hospital Course:  Patient admitted due to fever, fatigue and chills at home. He has not had any cough or sob at home. He was recently treated for PNA at the end of march and was feeling a lot better since then, until he had a sudden onset of symptoms. He did describe some productive cough for several morning and the mucus he coughed op had rust colored blood as well as bright red blood sometimes. Theses episodes only happened shortly after wakening in the morning and didn't persist during the day, no associated sob or other symptoms during those times. Denies nasal drainage, congestion, or nose bleeds.       Started on broad spectrum and feeling a lot better.  Still on RA and no cough, wheezes or sob     CT chest ordered to get a better look at the consolidations and possible hemoptysis, will considered pulmonology and/or ID consulted to help direct care    5/1  PMH of DM, renal transplant 2016, CHFpEF, AF/AFL (PVI/PWI/CTI 6/2024), CVA, recurrent PNA who presents with fevers, chills.  States that symptoms are similar to last month when he was admitted for pneumonia. No other pna symptoms despite CXR changes.BC x2 4/29 NGTD. vancomycins discontinued.  respiratory cultures pending.  CT chest ordered -L GGO concerning for pneumonia possible hiatal hernia and possible anterior vertebral osteophyte near mid esophagus.  SLP consulted. MBSS and barium esophogram to assess for hiatal hernia (second episode of pneumonia since March 2025) and recurrent aspiration as a cause of repeat pneumonia. continue cefepime. completed azithromycin. denies hemoptysis this admission. last episode 2 weeks back. resumed prograf 1mg BID. continue prednisone. hold cellcept for now   5/2 s/p FEES. SLP recs regular diet/thin liquids CT chest -Bilateral multilobar consolidations and ground-glass opacities associated with bronchial thickening suggestive of infectious/inflammatory process. Right moderate and left small pleural effusions.  ID consulted for recurrent pneumonia/ immunosuppressed/ hemoptysis.  SBP 160s-180s. coreg previously due to bradycardia during hospitalization 2/202. discontinued Spironolactone 25 mg daily -  3/2024 due to getting LH and dizzy . procardia listed as allergy. Doxazosin increased to 8mg daiy. . HCTZ discontinued. started on torsemide 40mg daily. ID eval  -  resp infection panel and urine legionella  MRSA nares, resp culture  5/3 SBP 200s overnight. started on IV hydralzine. received IV hydralazine 15mg x 2 doses. BP  132/67 . K replaced. RIP negative. EKG -Atrial fibrillation with premature ventricular or aberrantly conducted complexe. Low voltage QRS. KTM f/u - Cr  elevated from his baseline - suspicion for failing allograft and approaching need for dialysis .  hold valsartan for now. monitor BP  5/4 K replaced. SBP 200s overnight. received hydralazine 15mg x 1 overnight. discussed with KTM. Cr stable. resume valsartan. Clonidine 0.1 mg PRN for SBP > 170mm HG. continue  cefepime for 7 day course (end date 5/5/25)  5/5 complete cefepime today. CR stable. SBP 180s overnight. started on clonidine patch and  eplerenone 50mg daily.   per radiology, small hiatal hernia visible on their CT chest 5/1/25. reports left LE pain with ambulation. WENDI ordered   5/6 SBP 160s. K replaced.  monitor. WENDI -Right Leg: Segmental pressures and PVR waveforms suggest moderate peripheral arterial occlusive disease. Rt Great Toe: The PPG waveform as described above. - TBI of 0.55 with a great toe pressure of 95 mmHg is noted. Left Leg: Segmental pressures and PVR waveforms suggest moderate peripheral arterial occlusive disease. Lt Great Toe: The PPG waveform as described above. - TBI of 0.59 with a great toe pressure of 101 mmHg is noted.  Endocrine consulted for insulin pump. Hold Cellcept - resume after one month. monitor blood pressurre      Review of Systems:   Pain scale:   Constitutional:  fever,  chills, headache, vision loss, hearing loss, weight loss, Generalized weakness, falls, loss of smell, loss of taste, poor appetite,  sore throat  Respiratory: cough, shortness of breath.   Cardiovascular: chest pain, dizziness, palpitations, orthopnea, swelling of feet, syncope  Gastrointestinal: nausea, vomiting, abdominal pain, diarrhea, black stool,  blood in stool, change in bowel habits, constipation  Genitourinary: hematuria, dysuria, urgency, frequency  Integument/Breast: rash,  pruritis  Hematologic/Lymphatic: easy bruising, lymphadenopathy  Musculoskeletal: arthralgias , myalgias, back pain, neck pain, knee pain  Neurological: confusion, seizures, tremors, slurred speech  Behavioral/Psych:   "depression, anxiety, auditory or visual hallucinations     OBJECTIVE:     Physical Exam:  Body mass index is 26.39 kg/m².    Constitutional: Appears well-developed and well-nourished.   Head: Normocephalic and atraumatic.   Neck: Normal range of motion. Neck supple.   Cardiovascular: Normal heart rate.  Regular heart rhythm.  Pulmonary/Chest: Effort normal.   Abdominal: No distension.  No tenderness  Musculoskeletal: Normal range of motion. No edema.   Neurological: Alert and oriented to person, place, and time.   Skin: Skin is warm and dry.   Psychiatric: Normal mood and affect. Behavior is normal.                  Vital Signs  Temp: 97.5 °F (36.4 °C) (05/06/25 1147)  Pulse: 88 (05/06/25 1517)  Resp: 17 (05/06/25 1147)  BP: 136/79 (05/06/25 1147)  SpO2: 99 % (05/06/25 1147)     24 Hour VS Range    Temp:  [97.5 °F (36.4 °C)-97.9 °F (36.6 °C)]   Pulse:  []   Resp:  [16-20]   BP: (136-178)/(77-92)   SpO2:  [95 %-99 %]     Intake/Output Summary (Last 24 hours) at 5/6/2025 1536  Last data filed at 5/6/2025 0616  Gross per 24 hour   Intake 702 ml   Output --   Net 702 ml           I/O This Shift:  No intake/output data recorded.    Wt Readings from Last 3 Encounters:   04/29/25 90.7 kg (200 lb)   04/22/25 92.8 kg (204 lb 9.4 oz)   04/16/25 91.9 kg (202 lb 9.6 oz)       I have personally reviewed the vitals and recorded Intake/Output     Laboratory/Diagnostic Data:    CBC/Anemia Labs: Coags:    Recent Labs   Lab 05/04/25  0233 05/05/25  0513 05/06/25  0644   WBC 4.35 5.05 4.95   HGB 8.6* 9.0* 8.6*   HCT 29.8* 31.5* 29.6*   * 134* 137*   MCV 77* 78* 77*   RDW 19.7* 19.7* 19.6*    No results for input(s): "PT", "INR", "APTT" in the last 168 hours.     Chemistries: ABG:   Recent Labs   Lab 05/04/25  0233 05/05/25  0513 05/06/25  0644    138 138   K 3.4* 3.9 3.4*    106 107   CO2 20* 22* 22*   BUN 43* 46* 45*   CREATININE 2.7* 2.8* 2.9*   CALCIUM 8.3* 8.7 8.7   MG 1.9 1.9 1.8   PHOS 3.9 4.1 4.3    No " "results for input(s): "PH", "PCO2", "PO2", "HCO3", "POCSATURATED", "BE" in the last 168 hours.       POCT Glucose: HbA1c:    Recent Labs   Lab 05/04/25 2055 05/05/25  0816 05/05/25  1210 05/05/25  1605 05/05/25  2219 05/05/25  2305   POCTGLUCOSE 234* 161* 234* 221* 57* 123*    Hemoglobin A1C   Date Value Ref Range Status   01/27/2025 8.6 (H) 4.0 - 5.6 % Final     Comment:     ADA Screening Guidelines:  5.7-6.4%  Consistent with prediabetes  >or=6.5%  Consistent with diabetes    High levels of fetal hemoglobin interfere with the HbA1C  assay. Heterozygous hemoglobin variants (HbS, HgC, etc)do  not significantly interfere with this assay.   However, presence of multiple variants may affect accuracy.     12/07/2024 8.1 (H) 4.0 - 5.6 % Final     Comment:     ADA Screening Guidelines:  5.7-6.4%  Consistent with prediabetes  >or=6.5%  Consistent with diabetes    High levels of fetal hemoglobin interfere with the HbA1C  assay. Heterozygous hemoglobin variants (HbS, HgC, etc)do  not significantly interfere with this assay.   However, presence of multiple variants may affect accuracy.     11/19/2024 7.4 (H) 4.0 - 5.6 % Final     Comment:     ADA Screening Guidelines:  5.7-6.4%  Consistent with prediabetes  >or=6.5%  Consistent with diabetes    High levels of fetal hemoglobin interfere with the HbA1C  assay. Heterozygous hemoglobin variants (HbS, HgC, etc)do  not significantly interfere with this assay.   However, presence of multiple variants may affect accuracy.       Hemoglobin A1c   Date Value Ref Range Status   03/27/2025 8.3 (H) 4.0 - 5.6 % Final     Comment:     ADA Screening Guidelines:  5.7-6.4%  Consistent with prediabetes  >=6.5%  Consistent with diabetes    High levels of fetal hemoglobin interfere with the HbA1C  assay. Heterozygous hemoglobin variants (HbS, HgC, etc)do  not significantly interfere with this assay.   However, presence of multiple variants may affect accuracy.        Cardiac Enzymes: Ejection " "Fractions:    No results for input(s): "CPK", "CPKMB", "MB", "TROPONINI" in the last 72 hours. EF   Date Value Ref Range Status   02/21/2025 53 % Final   06/16/2024 58 % Final          No results for input(s): "COLORU", "APPEARANCEUA", "PHUR", "SPECGRAV", "PROTEINUA", "GLUCUA", "KETONESU", "BILIRUBINUA", "OCCULTUA", "NITRITE", "UROBILINOGEN", "LEUKOCYTESUR", "RBCUA", "WBCUA", "BACTERIA", "SQUAMEPITHEL", "HYALINECASTS" in the last 48 hours.    Invalid input(s): "WRIGHTSUR"    Lactate (Lactic Acid) (mmol/L)   Date Value   07/11/2022 1.0   07/10/2022 3.0 (H)   07/10/2022 3.1 (H)   03/15/2019 1.5   03/15/2019 3.3 (H)     BNP (pg/mL)   Date Value   03/16/2025 1,766 (H)   02/20/2025 1,055 (H)   06/25/2024 992 (H)   06/15/2024 3,203 (H)   06/04/2024 3,313 (H)     CRP (mg/L)   Date Value   04/02/2024 6.9   07/10/2022 40.5 (H)     Sed Rate   Date Value   04/02/2024 62 mm/Hr (H)   03/09/2007 2 mm/hr   05/10/2006 6 mm/hr     D-Dimer (mg/L FEU)   Date Value   07/10/2022 0.45     Ferritin (ng/mL)   Date Value   04/01/2024 816 (H)   07/10/2022 148   02/07/2022 73   05/26/2021 29   01/30/2017 251   11/08/2016 389 (H)     LD (U/L)   Date Value   04/01/2024 206   07/10/2022 221   11/10/2021 220     Troponin I (ng/mL)   Date Value   06/15/2024 0.812 (H)   06/15/2024 0.948 (H)   06/15/2024 0.934 (H)   05/02/2024 0.036 (H)   05/02/2024 0.049 (H)   07/11/2022 0.090 (H)   07/10/2022 0.265 (H)   03/16/2019 0.037 (H)     CPK (U/L)   Date Value   07/10/2022 83   03/16/2019 56   05/09/2008 86   05/15/2006 87   05/10/2006 44   05/10/2006 29   05/09/2006 40     Results for orders placed or performed in visit on 07/11/24   Vitamin D    Collection Time: 07/11/24  4:44 PM   Result Value Ref Range    Vit D, 25-Hydroxy 17 (L) 30 - 96 ng/mL     *Note: Due to a large number of results and/or encounters for the requested time period, some results have not been displayed. A complete set of results can be found in Results Review.     SARS-CoV2 " (COVID-19) Qualitative PCR (no units)   Date Value   05/02/2025 Not Detected   03/16/2025 Not Detected   12/18/2020 Not Detected     SARS-CoV-2 RNA, Amplification, Qual (no units)   Date Value   03/16/2025 Negative   12/07/2024 Negative     POC Rapid COVID (no units)   Date Value   07/08/2022 Positive (A)   08/02/2021 Positive (A)       Microbiology labs for the last week  Microbiology Results (last 7 days)       Procedure Component Value Units Date/Time    Blood culture x two cultures. Draw prior to antibiotics. [[id number]]  (Normal) Collected: 04/29/25 0758    Order Status: Completed Specimen: Blood from Peripheral, Forearm, Left Updated: 05/04/25 1801     Blood Culture No Growth After 5 Days    Blood culture x two cultures. Draw prior to antibiotics. [[id number]]  (Normal) Collected: 04/29/25 0758    Order Status: Completed Specimen: Blood from Peripheral, Antecubital, Left Updated: 05/04/25 1801     Blood Culture No Growth After 5 Days    Respiratory Infection Panel (PCR), Nasopharyngeal [[id number]] Collected: 05/02/25 1027    Order Status: Completed Specimen: Nasopharyngeal Swab Updated: 05/02/25 1545     Respiratory Infection Panel Source Nasopharyngeal Swab     Adenovirus Not Detected     Coronavirus 229E, Common Cold Virus Not Detected     Coronavirus HKU1, Common Cold Virus Not Detected     Coronavirus NL63, Common Cold Virus Not Detected     Coronavirus OC43, Common Cold Virus Not Detected     SARS-CoV2 (COVID-19) Qualitative PCR Not Detected     Human Metapneumovirus Not Detected     Human Rhinovirus/Enterovirus Not Detected     Influenza A Not Detected     Influenza B Not Detected     Parainfluenza Virus 1 Not Detected     Parainfluenza Virus 2 Not Detected     Parainfluenza Virus 3 Not Detected     Parainfluenza Virus 4 Not Detected     Respiratory Syncytial Virus Not Detected     Bordetella Parapertussis (JD0549) Not Detected     Bordetella pertussis (ptxP) Not Detected     Chlamydia pneumoniae Not  Detected     Mycoplasma pneumoniae Not Detected    MRSA Screen by PCR [[id number]]     Order Status: Sent Specimen: Nasal Swab     Culture, Respiratory with Gram Stain [[id number]]     Order Status: Sent Specimen: Respiratory             Reviewed and noted in plan where applicable- Please see chart for full lab data.    Lines/Drains:       Peripheral IV - Single Lumen 04/30/25 1112 20 G Anterior;Distal;Right Upper Arm (Active)   Site Assessment Clean;Dry;Intact;No redness;No swelling;No drainage 05/01/25 0800   Line Status Saline locked;Flushed 05/01/25 0800   Dressing Status Intact;Dry;Clean 05/01/25 0800   Dressing Intervention Integrity maintained 05/01/25 0800   Number of days: 1       Imaging  ECG Results              EKG 12-lead (Final result)        Collection Time Result Time QRS Duration OHS QTC Calculation    04/29/25 07:35:29 04/29/25 07:45:23 112 460                     Final result by Interface, Lab In [facility] (04/29/25 07:45:29)                   Narrative:    Test Reason : Z13.6,    Vent. Rate :  78 BPM     Atrial Rate : 300 BPM     P-R Int :    ms          QRS Dur : 112 ms      QT Int : 404 ms       P-R-T Axes : -89 105   8 degrees    QTcB Int : 460 ms    Atrial flutter  Rightward axis  Nonspecific ST and T wave abnormality  Low septal forces ; Abnormal R wave progression in the precordial leads  -Possible anterior infarct  Possible lateral infarct vs lead reversal  Prolonged QT  Abnormal ECG  When compared with ECG of 16-Apr-2025 13:38,  The axis Shifted right  ST no longer elevated in Anterior leads  Confirmed by Tejas Carter ([id number]) on 4/29/2025 7:45:21 AM    Referred By: AAAREFERRAL SELF           Confirmed By: Tejsa Carter                                    Results for orders placed during the hospital encounter of 02/20/25    Echo    Interpretation Summary    Left Ventricle: There is mild concentric hypertrophy. There is low normal systolic function with a visually estimated ejection fraction of 50  - 55%. Grade III diastolic dysfunction.    Left Ventricle: The left ventricle is at the upper limits of normal in size. Mildly increased wall thickness. There is mild concentric hypertrophy. Regional wall motion abnormalities present. See diagram for wall motion findings. There is low normal systolic function with a visually estimated ejection fraction of 50 - 55%. Ejection fraction is approximately 53%. Grade III diastolic dysfunction.    Right Ventricle: Systolic function is borderline low despite the low TAPSE.    Left Atrium: Left atrium is moderately dilated.    Aortic Valve: The aortic valve is a trileaflet valve. There is mild aortic valve sclerosis. There is moderate annular calcification present. There is mild aortic regurgitation with a centrally directed jet.    Mitral Valve: There is mild regurgitation with a centrally directed jet.    Pericardium: There is a trivial posterior effusion and under the RA.    Tricuspid Valve: There is mild regurgitation.    Pulmonary Artery: The estimated pulmonary artery systolic pressure is 30 mmHg.      VAS Ankle Brachial Indices Resting  Indication  ========    PAD    Pressure Lower  ============    Rt brachial A syst BP  172 mmHg  Rt low thigh BP    171 mmHg  Rt calf  mmHg  Rt PTA BP  157 mmHg  Rt dors pedis A  mmHg  Rt toe BP  95 mmHg  Rt WENDI post tibial (rt post tib A BP / max brach A BP) 0.91  Rt WENDI (rt dors ped A BP / max brach A BP) 0.86  Rt ankle BP / max brach A BP   0.91  Rt TBI (rt toe BP / max brach A BP)    0.55  Lt brachial A syst BP  172 mmHg  Lt low thigh BP    180 mmHg  Lt calf  mmHg  Lt PTA BP  135 mmHg  Lt dors pedis A  mmHg  Lt toe BP  101 mmHg  Lt WENDI (lt post tibial A BP / max brach A BP)  0.78  Lt WENDI dors ped (lt dors ped A BP / max brach A BP)    0.68  Lt ankle BP / max brach A BP   0.78  Lt TBI (lt toe BP / max brach A BP)    0.59    PPG Lower  =========    Rt toe BP - PPG    95 mmHg  Rt toe digit waveform: mildly  dampened  Lt toe BP - PPG    101 mmHg  Lt toe digit waveform: mildly dampened    Impression  =========    Right Leg: Segmental pressures and PVR waveforms suggest moderate peripheral arterial occlusive disease.  Rt Great Toe: The PPG waveform as described above. - TBI of 0.55 with a great toe pressure of 95 mmHg is noted.    Left Leg: Segmental pressures and PVR waveforms suggest moderate peripheral arterial occlusive disease.  Lt Great Toe: The PPG waveform as described above. - TBI of 0.59 with a great toe pressure of 101 mmHg is noted.    DATE OF SERVICE: 05/06/2025                                                      Sonographer: Yaneth Soto  Electronically Signed by: REENA Garcia M.D. at 05/06/2025-13:01      Labs, Imaging, EKG and Diagnostic results from 5/6/2025 were reviewed.    Medications:  Medication list was reviewed and changes noted under Assessment/Plan.  Medications Ordered Prior to Encounter[1]  Scheduled Medications:  Current Facility-Administered Medications   Medication Dose Route Frequency    acetaminophen  1,000 mg Oral Q12H    atorvastatin  80 mg Oral Daily    cloNIDine 0.1 mg/24 hr td ptwk  1 patch Transdermal Q7 Days    doxazosin  8 mg Oral Daily    empagliflozin  10 mg Oral Daily    eplerenone  50 mg Oral Daily    gabapentin  300 mg Oral Daily    gabapentin  300 mg Oral After lunch    gabapentin  300 mg Oral QHS    hydrALAZINE  100 mg Oral Q8H    Lactobacillus rhamnosus GG  1 capsule Oral Daily    polyethylene glycol  17 g Oral Daily    predniSONE  5 mg Oral Daily    rivaroxaban  15 mg Oral Daily with dinner    tacrolimus  1 mg Oral BID    torsemide  40 mg Oral Daily    valsartan  320 mg Oral Daily     PRN:   Current Facility-Administered Medications:     albuterol-ipratropium, 3 mL, Nebulization, Q6H PRN    dextrose 50%, 12.5 g, Intravenous, PRN    dextrose 50%, 12.5 g, Intravenous, PRN    dextrose 50%, 25 g, Intravenous, PRN    dextrose 50%, 25 g, Intravenous, PRN    glucagon (human  recombinant), 1 mg, Intramuscular, PRN    glucagon (human recombinant), 1 mg, Intramuscular, PRN    glucose, 16 g, Oral, PRN    glucose, 16 g, Oral, PRN    glucose, 24 g, Oral, PRN    glucose, 24 g, Oral, PRN    hydrALAZINE, 15 mg, Intravenous, Q6H PRN    insulin aspart U-100, 0-10 Units, Subcutaneous, PRN    meclizine, 25 mg, Oral, TID PRN    melatonin, 6 mg, Oral, Nightly PRN    naloxone, 0.02 mg, Intravenous, PRN    ondansetron, 8 mg, Oral, Q8H PRN    prochlorperazine, 5 mg, Intravenous, Q6H PRN    sodium chloride 0.9%, 10 mL, Intravenous, Q8H PRN  Infusions:    insulin aspart U-100  0.6 Units/hr Subcutaneous Continuous 0.01 mL/hr at 05/06/25 1415 0.6 Units/hr at 05/06/25 1415     Estimated Creatinine Clearance: 27.9 mL/min (A) (based on SCr of 2.9 mg/dL (H)).             Assessment & Plan  Community acquired bacterial pneumonia  Hemoptysis    Patient has a diagnosis of pneumonia. The cause of the pneumonia is suspected to be bacterial in etiology but organism is not known. The pneumonia is stable. The patient has the following signs/symptoms of pneumonia: cough. The patient does not have a current oxygen requirement and the patient does not have a home oxygen requirement. I have reviewed the pertinent imaging. The following cultures have been collected: Blood cultures and Sputum culture The culture results are listed below.     Current antimicrobial regimen consists of the antibiotics listed below. Will monitor patient closely and continue current treatment plan unchanged.    -Given fevers/chills and recurrent pna, reasonable to continue below abx and deescalate when indicated  -f/u sputum, blood cx    Antibiotics (From admission, onward)      Start     Stop Route Frequency Ordered    04/30/25 0900  azithromycin (ZITHROMAX) 500 mg in 0.9% NaCl 250 mL IVPB (admixture device)         05/01/25 1259 IV Every 24 hours (non-standard times) 04/29/25 1016          Microbiology Results (last 7 days)       Procedure  Component Value Units Date/Time    Blood culture x two cultures. Draw prior to antibiotics. [[id number]]  (Normal) Collected: 04/29/25 0758    Order Status: Completed Specimen: Blood from Peripheral, Forearm, Left Updated: 05/04/25 1801     Blood Culture No Growth After 5 Days    Blood culture x two cultures. Draw prior to antibiotics. [[id number]]  (Normal) Collected: 04/29/25 0758    Order Status: Completed Specimen: Blood from Peripheral, Antecubital, Left Updated: 05/04/25 1801     Blood Culture No Growth After 5 Days    Respiratory Infection Panel (PCR), Nasopharyngeal [[id number]] Collected: 05/02/25 1027    Order Status: Completed Specimen: Nasopharyngeal Swab Updated: 05/02/25 1545     Respiratory Infection Panel Source Nasopharyngeal Swab     Adenovirus Not Detected     Coronavirus 229E, Common Cold Virus Not Detected     Coronavirus HKU1, Common Cold Virus Not Detected     Coronavirus NL63, Common Cold Virus Not Detected     Coronavirus OC43, Common Cold Virus Not Detected     SARS-CoV2 (COVID-19) Qualitative PCR Not Detected     Human Metapneumovirus Not Detected     Human Rhinovirus/Enterovirus Not Detected     Influenza A Not Detected     Influenza B Not Detected     Parainfluenza Virus 1 Not Detected     Parainfluenza Virus 2 Not Detected     Parainfluenza Virus 3 Not Detected     Parainfluenza Virus 4 Not Detected     Respiratory Syncytial Virus Not Detected     Bordetella Parapertussis (BO3906) Not Detected     Bordetella pertussis (ptxP) Not Detected     Chlamydia pneumoniae Not Detected     Mycoplasma pneumoniae Not Detected    MRSA Screen by PCR [[id number]]     Order Status: Sent Specimen: Nasal Swab     Culture, Respiratory with Gram Stain [[id number]]     Order Status: Sent Specimen: Respiratory           5/1  BC x2 4/29 NGTD. vancomycins discontinued.  respiratory cultures pending.  CT chest ordered -RML GGO concerning for pneumonia possible hiatal hernia and possible anterior vertebral  osteophyte near mid esophagus.  SLP consulted  and consider esophogram to assess for hiatal hernia and recurrent aspiration as a cause of repeat pneumonia. continue cefepime. completed azithromycin. denies hemoptysis this admission. last episode 2 weeks back. resumed prograf 1mg BID. continue prednisone. hold cellcept for now     Patient has a diagnosis of pneumonia. The cause of the pneumonia is suspected to be bacterial in etiology but organism is not known. The pneumonia is stable. The patient has the following signs/symptoms of pneumonia: cough. The patient does not have a current oxygen requirement and the patient does not have a home oxygen requirement. I have reviewed the pertinent imaging. The following cultures have been collected: Blood cultures and Sputum culture The culture results are listed below.     Current antimicrobial regimen consists of the antibiotics listed below. Will monitor patient closely and continue current treatment plan unchanged.    -Given fevers/chills and recurrent pna, reasonable to continue below abx and deescalate when indicated  -f/u sputum, blood cx    Antibiotics (From admission, onward)      Start     Stop Route Frequency Ordered    04/30/25 0900  azithromycin (ZITHROMAX) 500 mg in 0.9% NaCl 250 mL IVPB (admixture device)         05/01/25 1259 IV Every 24 hours (non-standard times) 04/29/25 1016          Antibiotics (From admission, onward)      Start     Stop Route Frequency Ordered    04/30/25 0900  azithromycin (ZITHROMAX) 500 mg in 0.9% NaCl 250 mL IVPB (admixture device)         05/01/25 1259 IV Every 24 hours (non-standard times) 04/29/25 1016          5/1  PMH of DM, renal transplant 2016, CHFpEF, AF/AFL (PVI/PWI/CTI 6/2024), CVA, recurrent PNA who presents with fevers, chills.  States that symptoms are similar to last month when he was admitted for pneumonia. No other pna symptoms despite CXR changes.BC x2 4/29 NGTD. vancomycins discontinued.  respiratory cultures  pending.  CT chest ordered -L GGO concerning for pneumonia possible hiatal hernia and possible anterior vertebral osteophyte near mid esophagus.  SLP consulted  and  barium esophogram to assess for hiatal hernia (second episode of pneumonia since March 2025) and recurrent aspiration as a cause of repeat pneumonia. continue cefepime. completed azithromycin. denies hemoptysis this admission. last episode 2 weeks back. resumed prograf 1mg BID. continue prednisone. hold cellcept for now     Patient has a diagnosis of pneumonia. The cause of the pneumonia is suspected to be bacterial in etiology but organism is not known. The pneumonia is stable. The patient has the following signs/symptoms of pneumonia: cough. The patient does not have a current oxygen requirement and the patient does not have a home oxygen requirement. I have reviewed the pertinent imaging. The following cultures have been collected: Blood cultures and Sputum culture The culture results are listed below.     Current antimicrobial regimen consists of the antibiotics listed below. Will monitor patient closely and continue current treatment plan unchanged.    -Given fevers/chills and recurrent pna, reasonable to continue below abx and deescalate when indicated  -f/u sputum, blood cx    Antibiotics (From admission, onward)      Start     Stop Route Frequency Ordered    04/30/25 0900  azithromycin (ZITHROMAX) 500 mg in 0.9% NaCl 250 mL IVPB (admixture device)         05/01/25 1259 IV Every 24 hours (non-standard times) 04/29/25 1016          Microbiology Results (last 7 days)       Procedure Component Value Units Date/Time    Blood culture x two cultures. Draw prior to antibiotics. [[id number]]  (Normal) Collected: 04/29/25 0758    Order Status: Completed Specimen: Blood from Peripheral, Forearm, Left Updated: 05/04/25 1801     Blood Culture No Growth After 5 Days    Blood culture x two cultures. Draw prior to antibiotics. [[id number]]  (Normal)  Collected: 04/29/25 0758    Order Status: Completed Specimen: Blood from Peripheral, Antecubital, Left Updated: 05/04/25 1801     Blood Culture No Growth After 5 Days    Respiratory Infection Panel (PCR), Nasopharyngeal [[id number]] Collected: 05/02/25 1027    Order Status: Completed Specimen: Nasopharyngeal Swab Updated: 05/02/25 1545     Respiratory Infection Panel Source Nasopharyngeal Swab     Adenovirus Not Detected     Coronavirus 229E, Common Cold Virus Not Detected     Coronavirus HKU1, Common Cold Virus Not Detected     Coronavirus NL63, Common Cold Virus Not Detected     Coronavirus OC43, Common Cold Virus Not Detected     SARS-CoV2 (COVID-19) Qualitative PCR Not Detected     Human Metapneumovirus Not Detected     Human Rhinovirus/Enterovirus Not Detected     Influenza A Not Detected     Influenza B Not Detected     Parainfluenza Virus 1 Not Detected     Parainfluenza Virus 2 Not Detected     Parainfluenza Virus 3 Not Detected     Parainfluenza Virus 4 Not Detected     Respiratory Syncytial Virus Not Detected     Bordetella Parapertussis (DE1729) Not Detected     Bordetella pertussis (ptxP) Not Detected     Chlamydia pneumoniae Not Detected     Mycoplasma pneumoniae Not Detected    MRSA Screen by PCR [[id number]]     Order Status: Sent Specimen: Nasal Swab     Culture, Respiratory with Gram Stain [[id number]]     Order Status: Sent Specimen: Respiratory           5/1  BC x2 4/29 NGTD. vancomycins discontinued.  respiratory cultures pending.  CT chest ordered -RML GGO concerning for pneumonia possible hiatal hernia and possible anterior vertebral osteophyte near mid esophagus.  SLP consulted  and consider esophogram to assess for hiatal hernia and recurrent aspiration as a cause of repeat pneumonia. continue cefepime. completed azithromycin. denies hemoptysis this admission. last episode 2 weeks back. resumed prograf 1mg BID. continue prednisone. hold cellcept for now     Patient has a diagnosis of  pneumonia. The cause of the pneumonia is suspected to be bacterial in etiology but organism is not known. The pneumonia is stable. The patient has the following signs/symptoms of pneumonia: cough. The patient does not have a current oxygen requirement and the patient does not have a home oxygen requirement. I have reviewed the pertinent imaging. The following cultures have been collected: Blood cultures and Sputum culture The culture results are listed below.     Current antimicrobial regimen consists of the antibiotics listed below. Will monitor patient closely and continue current treatment plan unchanged.    -Given fevers/chills and recurrent pna, reasonable to continue below abx and deescalate when indicated  -f/u sputum, blood cx    Antibiotics (From admission, onward)      Start     Stop Route Frequency Ordered    04/30/25 0900  azithromycin (ZITHROMAX) 500 mg in 0.9% NaCl 250 mL IVPB (admixture device)         05/01/25 1259 IV Every 24 hours (non-standard times) 04/29/25 1016          Antibiotics (From admission, onward)      Start     Stop Route Frequency Ordered    04/30/25 0900  azithromycin (ZITHROMAX) 500 mg in 0.9% NaCl 250 mL IVPB (admixture device)         05/01/25 1259 IV Every 24 hours (non-standard times) 04/29/25 1016            Patient has a diagnosis of pneumonia. The cause of the pneumonia is suspected to be bacterial in etiology but organism is not known. The pneumonia is stable. The patient has the following signs/symptoms of pneumonia: cough. The patient does not have a current oxygen requirement and the patient does not have a home oxygen requirement. I have reviewed the pertinent imaging. The following cultures have been collected: Blood cultures and Sputum culture The culture results are listed below.     Current antimicrobial regimen consists of the antibiotics listed below. Will monitor patient closely and continue current treatment plan unchanged.    -Given fevers/chills and recurrent  pna, reasonable to continue below abx and deescalate when indicated  -f/u sputum, blood cx    Antibiotics (From admission, onward)      Start     Stop Route Frequency Ordered    04/30/25 0900  azithromycin (ZITHROMAX) 500 mg in 0.9% NaCl 250 mL IVPB (admixture device)         05/01/25 1259 IV Every 24 hours (non-standard times) 04/29/25 1016          Microbiology Results (last 7 days)       Procedure Component Value Units Date/Time    Blood culture x two cultures. Draw prior to antibiotics. [[id number]]  (Normal) Collected: 04/29/25 0758    Order Status: Completed Specimen: Blood from Peripheral, Forearm, Left Updated: 05/04/25 1801     Blood Culture No Growth After 5 Days    Blood culture x two cultures. Draw prior to antibiotics. [[id number]]  (Normal) Collected: 04/29/25 0758    Order Status: Completed Specimen: Blood from Peripheral, Antecubital, Left Updated: 05/04/25 1801     Blood Culture No Growth After 5 Days    Respiratory Infection Panel (PCR), Nasopharyngeal [[id number]] Collected: 05/02/25 1027    Order Status: Completed Specimen: Nasopharyngeal Swab Updated: 05/02/25 1545     Respiratory Infection Panel Source Nasopharyngeal Swab     Adenovirus Not Detected     Coronavirus 229E, Common Cold Virus Not Detected     Coronavirus HKU1, Common Cold Virus Not Detected     Coronavirus NL63, Common Cold Virus Not Detected     Coronavirus OC43, Common Cold Virus Not Detected     SARS-CoV2 (COVID-19) Qualitative PCR Not Detected     Human Metapneumovirus Not Detected     Human Rhinovirus/Enterovirus Not Detected     Influenza A Not Detected     Influenza B Not Detected     Parainfluenza Virus 1 Not Detected     Parainfluenza Virus 2 Not Detected     Parainfluenza Virus 3 Not Detected     Parainfluenza Virus 4 Not Detected     Respiratory Syncytial Virus Not Detected     Bordetella Parapertussis (EY3587) Not Detected     Bordetella pertussis (ptxP) Not Detected     Chlamydia pneumoniae Not Detected     Mycoplasma  pneumoniae Not Detected    MRSA Screen by PCR [[id number]]     Order Status: Sent Specimen: Nasal Swab     Culture, Respiratory with Gram Stain [[id number]]     Order Status: Sent Specimen: Respiratory           5/1  BC x2 4/29 NGTD. vancomycins discontinued.  respiratory cultures pending.  CT chest ordered -L GGO concerning for pneumonia possible hiatal hernia and possible anterior vertebral osteophyte near mid esophagus.  SLP consulted  and consider esophogram to assess for hiatal hernia and recurrent aspiration as a cause of repeat pneumonia. continue cefepime. completed azithromycin. denies hemoptysis this admission. last episode 2 weeks back. resumed prograf 1mg BID. continue prednisone. hold cellcept for now     Patient has a diagnosis of pneumonia. The cause of the pneumonia is suspected to be bacterial in etiology but organism is not known. The pneumonia is stable. The patient has the following signs/symptoms of pneumonia: cough. The patient does not have a current oxygen requirement and the patient does not have a home oxygen requirement. I have reviewed the pertinent imaging. The following cultures have been collected: Blood cultures and Sputum culture The culture results are listed below.     Current antimicrobial regimen consists of the antibiotics listed below. Will monitor patient closely and continue current treatment plan unchanged.    -Given fevers/chills and recurrent pna, reasonable to continue below abx and deescalate when indicated  -f/u sputum, blood cx    Antibiotics (From admission, onward)      Start     Stop Route Frequency Ordered    04/30/25 0900  azithromycin (ZITHROMAX) 500 mg in 0.9% NaCl 250 mL IVPB (admixture device)         05/01/25 1259 IV Every 24 hours (non-standard times) 04/29/25 1016          Antibiotics (From admission, onward)      Start     Stop Route Frequency Ordered    04/30/25 0900  azithromycin (ZITHROMAX) 500 mg in 0.9% NaCl 250 mL IVPB (admixture device)          05/01/25 1259 IV Every 24 hours (non-standard times) 04/29/25 1016          5/1  PMH of DM, renal transplant 2016, CHFpEF, AF/AFL (PVI/PWI/CTI 6/2024), CVA, recurrent PNA who presents with fevers, chills.  States that symptoms are similar to last month when he was admitted for pneumonia. No other pna symptoms despite CXR changes.BC x2 4/29 NGTD. vancomycins discontinued.  respiratory cultures pending.  CT chest ordered -L GGO concerning for pneumonia possible hiatal hernia and possible anterior vertebral osteophyte near mid esophagus.  SLP consulted  and  barium esophogram to assess for hiatal hernia (second episode of pneumonia since March 2025) and recurrent aspiration as a cause of repeat pneumonia. continue cefepime. completed azithromycin. denies hemoptysis this admission. last episode 2 weeks back. resumed prograf 1mg BID. continue prednisone. hold cellcept for now     Patient has a diagnosis of pneumonia. The cause of the pneumonia is suspected to be bacterial in etiology but organism is not known. The pneumonia is stable. The patient has the following signs/symptoms of pneumonia: cough. The patient does not have a current oxygen requirement and the patient does not have a home oxygen requirement. I have reviewed the pertinent imaging. The following cultures have been collected: Blood cultures and Sputum culture The culture results are listed below.     Current antimicrobial regimen consists of the antibiotics listed below. Will monitor patient closely and continue current treatment plan unchanged.    -Given fevers/chills and recurrent pna, reasonable to continue below abx and deescalate when indicated  -f/u sputum, blood cx    Antibiotics (From admission, onward)      Start     Stop Route Frequency Ordered    04/30/25 0900  azithromycin (ZITHROMAX) 500 mg in 0.9% NaCl 250 mL IVPB (admixture device)         05/01/25 1259 IV Every 24 hours (non-standard times) 04/29/25 1016          Microbiology Results  (last 7 days)       Procedure Component Value Units Date/Time    Blood culture x two cultures. Draw prior to antibiotics. [[id number]]  (Normal) Collected: 04/29/25 0758    Order Status: Completed Specimen: Blood from Peripheral, Forearm, Left Updated: 05/04/25 1801     Blood Culture No Growth After 5 Days    Blood culture x two cultures. Draw prior to antibiotics. [[id number]]  (Normal) Collected: 04/29/25 0758    Order Status: Completed Specimen: Blood from Peripheral, Antecubital, Left Updated: 05/04/25 1801     Blood Culture No Growth After 5 Days    Respiratory Infection Panel (PCR), Nasopharyngeal [[id number]] Collected: 05/02/25 1027    Order Status: Completed Specimen: Nasopharyngeal Swab Updated: 05/02/25 1545     Respiratory Infection Panel Source Nasopharyngeal Swab     Adenovirus Not Detected     Coronavirus 229E, Common Cold Virus Not Detected     Coronavirus HKU1, Common Cold Virus Not Detected     Coronavirus NL63, Common Cold Virus Not Detected     Coronavirus OC43, Common Cold Virus Not Detected     SARS-CoV2 (COVID-19) Qualitative PCR Not Detected     Human Metapneumovirus Not Detected     Human Rhinovirus/Enterovirus Not Detected     Influenza A Not Detected     Influenza B Not Detected     Parainfluenza Virus 1 Not Detected     Parainfluenza Virus 2 Not Detected     Parainfluenza Virus 3 Not Detected     Parainfluenza Virus 4 Not Detected     Respiratory Syncytial Virus Not Detected     Bordetella Parapertussis ([id number]) Not Detected     Bordetella pertussis (ptxP) Not Detected     Chlamydia pneumoniae Not Detected     Mycoplasma pneumoniae Not Detected    MRSA Screen by PCR [[id number]]     Order Status: Sent Specimen: Nasal Swab     Culture, Respiratory with Gram Stain [[id number]]     Order Status: Sent Specimen: Respiratory           5/1  BC x2 4/29 NGTD. vancomycins discontinued.  respiratory cultures pending.  CT chest ordered -RML GGO concerning for pneumonia possible hiatal hernia and  possible anterior vertebral osteophyte near mid esophagus.  SLP consulted  and consider esophogram to assess for hiatal hernia and recurrent aspiration as a cause of repeat pneumonia. continue cefepime. completed azithromycin. denies hemoptysis this admission. last episode 2 weeks back. resumed prograf 1mg BID. continue prednisone. hold cellcept for now     Patient has a diagnosis of pneumonia. The cause of the pneumonia is suspected to be bacterial in etiology but organism is not known. The pneumonia is stable. The patient has the following signs/symptoms of pneumonia: cough. The patient does not have a current oxygen requirement and the patient does not have a home oxygen requirement. I have reviewed the pertinent imaging. The following cultures have been collected: Blood cultures and Sputum culture The culture results are listed below.     Current antimicrobial regimen consists of the antibiotics listed below. Will monitor patient closely and continue current treatment plan unchanged.    -Given fevers/chills and recurrent pna, reasonable to continue below abx and deescalate when indicated  -f/u sputum, blood cx    Antibiotics (From admission, onward)      Start     Stop Route Frequency Ordered    04/30/25 0900  azithromycin (ZITHROMAX) 500 mg in 0.9% NaCl 250 mL IVPB (admixture device)         05/01/25 1259 IV Every 24 hours (non-standard times) 04/29/25 1016          Antibiotics (From admission, onward)      Start     Stop Route Frequency Ordered    04/30/25 0900  azithromycin (ZITHROMAX) 500 mg in 0.9% NaCl 250 mL IVPB (admixture device)         05/01/25 1259 IV Every 24 hours (non-standard times) 04/29/25 1016          Type 2 diabetes mellitus with stage 3a chronic kidney disease, with long-term current use of insulin  Creatine stable for now. BMP reviewed- noted Estimated Creatinine Clearance: 27.9 mL/min (A) (based on SCr of 2.9 mg/dL (H)). according to latest data. Based on current GFR, CKD stage is stage 3  "- GFR 30-59.  Monitor UOP and serial BMP and adjust therapy as needed. Renally dose meds. Avoid nephrotoxic medications and procedures.  Hyperlipidemia  Continue statin    Prophylactic immunotherapy  KTM consutled for assistance with management     KTM consutled for assistance with management     KTM consutled for assistance with management     Chronic combined systolic (congestive) and diastolic (congestive) heart failure  Patient has Diastolic (HFpEF) heart failure that is Chronic. On presentation their CHF was well compensated. Most recent BNP and echo results are listed below.  No results for input(s): "BNP" in the last 72 hours.  Latest ECHO  Results for orders placed during the hospital encounter of 02/20/25    Echo    Interpretation Summary    Left Ventricle: There is mild concentric hypertrophy. There is low normal systolic function with a visually estimated ejection fraction of 50 - 55%. Grade III diastolic dysfunction.    Left Ventricle: The left ventricle is at the upper limits of normal in size. Mildly increased wall thickness. There is mild concentric hypertrophy. Regional wall motion abnormalities present. See diagram for wall motion findings. There is low normal systolic function with a visually estimated ejection fraction of 50 - 55%. Ejection fraction is approximately 53%. Grade III diastolic dysfunction.    Right Ventricle: Systolic function is borderline low despite the low TAPSE.    Left Atrium: Left atrium is moderately dilated.    Aortic Valve: The aortic valve is a trileaflet valve. There is mild aortic valve sclerosis. There is moderate annular calcification present. There is mild aortic regurgitation with a centrally directed jet.    Mitral Valve: There is mild regurgitation with a centrally directed jet.    Pericardium: There is a trivial posterior effusion and under the RA.    Tricuspid Valve: There is mild regurgitation.    Pulmonary Artery: The estimated pulmonary artery systolic " pressure is 30 mmHg.    Current Heart Failure Medications  hydrALAZINE tablet 100 mg, Every 8 hours, Oral  empagliflozin (Jardiance) tablet 10 mg, Daily, Oral  hydrALAZINE injection 15 mg, Every 6 hours PRN, Intravenous  torsemide tablet 40 mg, Daily, Oral  valsartan tablet 320 mg, Daily, Oral  torsemide (DEMADEX) tablet, Daily, Oral  eplerenone tablet 50 mg, Daily, Oral    Plan  - Monitor strict I&Os and daily weights.    - Place on telemetry  - Low sodium diet  - Cardiology has not been consulted  - The patient's volume status is at their baseline  S/P cadaveric kidney transplant 11/5/2016. ESRD secondary to HTN/DMII  -KTM consulted  -daily tacro level, dosing per KTM  -Cont prednisone    Immunocompromised state due to drug therapy  KTM consutled for assistance with management     KTM consutled for assistance with management     KTM consutled for assistance with management     CKD IV (severe)  Creatine stable for now. BMP reviewed- noted Estimated Creatinine Clearance: 27.9 mL/min (A) (based on SCr of 2.9 mg/dL (H)). according to latest data. Based on current GFR, CKD stage is stage 3 - GFR 30-59.  Monitor UOP and serial BMP and adjust therapy as needed. Renally dose meds. Avoid nephrotoxic medications and procedures.    Recent Labs   Lab 05/04/25  0233 05/05/25  0513 05/06/25  0644   BUN 43* 46* 45*   CREATININE 2.7* 2.8* 2.9*       I & O     Intake/Output Summary (Last 24 hours) at 5/6/2025 1536  Last data filed at 5/6/2025 0616  Gross per 24 hour   Intake 702 ml   Output --   Net 702 ml        5/3 KTM f/u - Cr elevated from his baseline - suspicion for failing allograft and approaching need for dialysis .  hold valsartan for now. monitor BP  Type 2 diabetes mellitus  Patient's FSGs are controlled on current medication regimen.  Last A1c reviewed-   Lab Results   Component Value Date    HGBA1C 8.3 (H) 03/27/2025     Most recent fingerstick glucose reviewed-   Recent Labs   Lab 05/05/25  1605 05/05/25  1761  05/05/25  2305   POCTGLUCOSE 221* 57* 123*     Current correctional scale  Medium  Maintain anti-hyperglycemic dose as follows-   Antihyperglycemics (From admission, onward)      Start     Stop Route Frequency Ordered    05/06/25 1415  insulin aspart U-100 insulin pump from home        Question Answer Comment   Target number 110    Basal Rate #1 0.6    Basal rate #1 time 3071-3918        -- SubQ Continuous 05/06/25 1303    05/06/25 1402  insulin aspart U-100 insulin pump from home 0-10 Units        Question Answer Comment   Target number 110    Carbohydrate coverage #1 7.5    Carbohydrate coverage #1 time 6612-5930    Sensitivity #1 40    Sensitivity #1 time 0327-8617        -- SubQ As needed (PRN) 05/06/25 1303    04/29/25 1115  empagliflozin (Jardiance) tablet 10 mg        Question Answer Comment   Does this patient have a diagnosis of heart failure? Yes    Does this patient have type 1 diabetes or diabetic ketoacidosis? No    Does this patient have symptomatic hypotension? No    Is the patient NPO or pending major surgery in next 3 days or less? No        -- Oral Daily 04/29/25 1016          Hold Oral hypoglycemics while patient is in the hospital.    Blood glucose acceptable  Persistent atrial fibrillation  Patient has persistent (7 days or more) atrial fibrillation. Patient is currently in atrial fibrillation. [id number] Score: 3. The patients heart rate in the last 24 hours is as follows:  Pulse  Min: 79  Max: 132     Antiarrhythmics       Anticoagulants  rivaroxaban tablet 15 mg, With dinner, Oral    Plan  - Replete lytes with a goal of K>4, Mg >2  - Patient is anticoagulated, see medications listed above.  - Patient's afib is currently controlled  Anticoagulant long-term use  This patient has long term use on an anticoagulant with Select Anticoagulant(s): Direct oral anticoagulant: Rivaroxaban (Xarelto). Their long term anticoagulation will be Held or Continued: continued. They are on long term  anticoagulation due to Reason for Anticoagulation: Atrial fibrillation.   BPH with urinary obstruction  Doxazosin   Immunosuppressed status  KTM consutled for assistance with management     KTM consutled for assistance with management     KTM consutled for assistance with management     Anemia of chronic disease  Anemia is likely due to chronic disease due to Chronic Kidney Disease. Most recent hemoglobin and hematocrit are listed below.  Recent Labs     05/04/25 0233 05/05/25 0513 05/06/25  0644   HGB 8.6* 9.0* 8.6*   HCT 29.8* 31.5* 29.6*     Plan  - Monitor serial CBC: Daily  - Transfuse PRBC if patient becomes hemodynamically unstable, symptomatic or H/H drops below 7/21.  - Patient has not received any PRBC transfusions to date  - Patient's anemia is currently stable  Hypokalemia  Patient's most recent potassium results are listed below.   Recent Labs     05/04/25 0233 05/05/25 0513 05/06/25  0644   K 3.4* 3.9 3.4*     Plan  - Replete potassium per protocol  - Monitor potassium Daily  - Patient's hypokalemia is stable  Pleural effusion  5/2 CT chest -Bilateral multilobar consolidations and ground-glass opacities associated with bronchial thickening suggestive of infectious/inflammatory process. Right moderate and left small pleural effusions.  ID consulted for recurrent pneumonia/ immunosuppressed/ hemoptysis.    Hypertensive urgency  Patient has a current diagnosis of hypertensive urgency (without evidence of end organ damage) which is uncontrolled.  Latest blood pressure and vitals reviewed-   Temp:  [97.5 °F (36.4 °C)-97.9 °F (36.6 °C)]   Pulse:  []   Resp:  [16-20]   BP: (136-178)/(77-92)   SpO2:  [95 %-99 %] .   Patient currently on IV antihypertensives.   Home meds for hypertension were reviewed and noted below.   Hypertension Medications              doxazosin (CARDURA) 4 MG tablet Take 1 tablet (4 mg total) by mouth once daily.    furosemide (LASIX) 40 MG tablet Take 2 tablets (80 mg total) by  mouth daily as needed (for swelling).    hydrALAZINE (APRESOLINE) 100 MG tablet Take 1 tablet (100 mg total) by mouth every 8 (eight) hours.    hydroCHLOROthiazide 12.5 MG Tab Take 1 tablet (12.5 mg total) by mouth once daily.    valsartan (DIOVAN) 320 MG tablet Take 1 tablet (320 mg total) by mouth once daily.            5/3 SBP 200s overnight. started on IV hydralzine. received IV hydralazine 15mg x 2 doses. /84.   5/4 SBP 200s overnight. received hydralazine 15mg x 1 overnight. discussed with KTM. Cr stable. resume valsartan. Clonidine 0.1 mg PRN for SBP > 170mm HG   5/5 SBP 180s overnight. SBP 180s overnight. started on clonidine patch and  eplerenone 50mg daily.   5/6 SBP 160s. monitor.     Pneumonia of left lower lobe due to infectious organism  as above     Antibiotics (From admission, onward)      Start     Stop Route Frequency Ordered    04/30/25 0900  azithromycin (ZITHROMAX) 500 mg in 0.9% NaCl 250 mL IVPB (admixture device)         05/01/25 1259 IV Every 24 hours (non-standard times) 04/29/25 1016            Microbiology Results (last 7 days)       Procedure Component Value Units Date/Time    Blood culture x two cultures. Draw prior to antibiotics. [[id number]]  (Normal) Collected: 04/29/25 0758    Order Status: Completed Specimen: Blood from Peripheral, Forearm, Left Updated: 05/04/25 1801     Blood Culture No Growth After 5 Days    Blood culture x two cultures. Draw prior to antibiotics. [[id number]]  (Normal) Collected: 04/29/25 0758    Order Status: Completed Specimen: Blood from Peripheral, Antecubital, Left Updated: 05/04/25 1801     Blood Culture No Growth After 5 Days    Respiratory Infection Panel (PCR), Nasopharyngeal [[id number]] Collected: 05/02/25 1027    Order Status: Completed Specimen: Nasopharyngeal Swab Updated: 05/02/25 1545     Respiratory Infection Panel Source Nasopharyngeal Swab     Adenovirus Not Detected     Coronavirus 229E, Common Cold Virus Not Detected     Coronavirus  HKU1, Common Cold Virus Not Detected     Coronavirus NL63, Common Cold Virus Not Detected     Coronavirus OC43, Common Cold Virus Not Detected     SARS-CoV2 (COVID-19) Qualitative PCR Not Detected     Human Metapneumovirus Not Detected     Human Rhinovirus/Enterovirus Not Detected     Influenza A Not Detected     Influenza B Not Detected     Parainfluenza Virus 1 Not Detected     Parainfluenza Virus 2 Not Detected     Parainfluenza Virus 3 Not Detected     Parainfluenza Virus 4 Not Detected     Respiratory Syncytial Virus Not Detected     Bordetella Parapertussis (AQ9132) Not Detected     Bordetella pertussis (ptxP) Not Detected     Chlamydia pneumoniae Not Detected     Mycoplasma pneumoniae Not Detected    MRSA Screen by PCR [[id number]]     Order Status: Sent Specimen: Nasal Swab     Culture, Respiratory with Gram Stain [[id number]]     Order Status: Sent Specimen: Respiratory           PAD (peripheral artery disease)  5/6  C/o clauadication pain left LE   WENDI -Right Leg: Segmental pressures and PVR waveforms suggest moderate peripheral arterial occlusive disease. Rt Great Toe: The PPG waveform as described above. - TBI of 0.55 with a great toe pressure of 95 mmHg is noted. Left Leg: Segmental pressures and PVR waveforms suggest moderate peripheral arterial occlusive disease. Lt Great Toe: The PPG waveform as described above. - TBI of 0.59 with a great toe pressure of 101 mmHg is noted.   vascular surgery follow up   Insulin pump in place      Opportunistic infection        VTE Risk Mitigation (From admission, onward)           Ordered     rivaroxaban tablet 15 mg  With dinner         04/29/25 1016     IP VTE HIGH RISK PATIENT  Once         04/29/25 1016     Place sequential compression device  Until discontinued         04/29/25 1016                    Discharge Planning   UBALDO: 5/7/2025     Code Status: Full Code   Medical Readiness for Discharge Date:   Discharge Plan A: Home with family   Discharge Delays: None  known at this time                    Cliff Guaman MD  Department of Hospital Medicine   [facility]         [1]   Current Facility-Administered Medications on File Prior to Encounter   Medication Dose Route Frequency Provider Last Rate Last Admin    balanced salt irrigation intra-ocular solution 1 drop  1 drop Right Eye On Call Procedure Jennifer Palacio MD        phenylephrine HCL 10% ophthalmic solution 1 drop  1 drop Right Eye PRN Jennifer Palacio MD        proparacaine 0.5 % ophthalmic solution 1 drop  1 drop Right Eye Daily PRN Jennifer Palacio MD        sodium chloride 0.9% flush 10 mL  10 mL Intravenous PRN Jennifer Palacio MD        TETRAcaine HCl (PF) 0.5 % Drop 1 drop  1 drop Right Eye PRJennifer Guzman MD         Current Outpatient Medications on File Prior to Encounter   Medication Sig Dispense Refill    atorvastatin (LIPITOR) 80 MG tablet Take 1 tablet (80 mg total) by mouth once daily. 90 tablet 3    blood sugar diagnostic Strp Use to check blood glucose 1 times daily, to use with insurance preferred meter 100 each 11    blood-glucose meter Misc Use to check blood glucose 1 times daily, to use with insurance preferred meter 1 each 0    blood-glucose sensor (DEXCOM G7 SENSOR) Kayla Change sensor every 10 days. 3 each 11    empagliflozin (JARDIANCE) 10 mg tablet Take 1 tablet (10 mg total) by mouth once daily. 90 tablet 0    ergocalciferol (ERGOCALCIFEROL) 50,000 unit Cap Take 1 capsule (50,000 Units total) by mouth every 7 days. 4 capsule 6    hydrALAZINE (APRESOLINE) 100 MG tablet Take 1 tablet (100 mg total) by mouth every 8 (eight) hours. 270 tablet 3    insulin aspart U-100 (NOVOLOG U-100 INSULIN ASPART) 100 unit/mL injection Use in pump, max daily 100 units. 30 mL 11    insulin  cart,aut,G6/7,cntr (OMNIPOD 5 G6-G7 INTRO KT,GEN5,) Crtg Use as directed. 1 each 1    insulin pump cart,auto,BT,G6/7 (OMNIPOD 5 G6-G7 PODS, GEN 5,) Crtg Change every 48 hours. 45 each 3     "Lactobacillus rhamnosus GG (CULTURELLE) 10 billion cell capsule Take 1 capsule by mouth once daily. 30 capsule 0    lancets 30 gauge Misc Use to check blood glucose 1 times daily, to use with insurance preferred meter 100 each 3    magnesium oxide (MAG-OX) 400 mg (241.3 mg magnesium) tablet Take 1 tablet (400 mg total) by mouth 2 (two) times daily. 60 tablet 11    meclizine (ANTIVERT) 25 mg tablet Take 1 tablet (25 mg total) by mouth 3 (three) times daily as needed for Dizziness. 21 tablet 3    [Paused] mycophenolate (CELLCEPT) 250 mg Cap Take 2 capsules (500 mg total) by mouth 2 (two) times daily. 120 capsule 11    pen needle, diabetic (BD ULTRA-FINE LO PEN NEEDLE) 32 gauge x 5/32" Ndle USE TO ADMINISTER INSULIN 4 TIMES DAILY. 400 each 3    polyethylene glycol (MIRALAX) 17 gram PwPk Take 17 gm (mixed in liquid) by mouth every day.      predniSONE (DELTASONE) 5 MG tablet Take 1 tablet (5 mg total) by mouth once daily. 30 tablet 11    rivaroxaban (XARELTO) 15 mg Tab Take 1 tablet (15 mg total) by mouth daily with dinner or evening meal. 30 tablet 11    tacrolimus (PROGRAF) 1 MG Cap Take 1 capsule (1 mg total) by mouth every 12 (twelve) hours. 60 capsule 11    valsartan (DIOVAN) 320 MG tablet Take 1 tablet (320 mg total) by mouth once daily. 90 tablet 3    [DISCONTINUED] insulin lispro (HUMALOG KWIKPEN INSULIN) 100 unit/mL pen Inject 18 units w/ breakfast, 16 units w/ lunch and dinner plus scale 150-200 +2, 201-250 +4, 251-300 +6, 301-350 +8. 30 mL 4     "

## 2025-05-06 NOTE — ASSESSMENT & PLAN NOTE
Patient's most recent potassium results are listed below.   Recent Labs     05/04/25  0233 05/05/25  0513 05/06/25  0644   K 3.4* 3.9 3.4*     Plan  - Replete potassium per protocol  - Monitor potassium Daily  - Patient's hypokalemia is stable

## 2025-05-06 NOTE — ASSESSMENT & PLAN NOTE
as above     Antibiotics (From admission, onward)      Start     Stop Route Frequency Ordered    04/30/25 0900  azithromycin (ZITHROMAX) 500 mg in 0.9% NaCl 250 mL IVPB (admixture device)         05/01/25 1259 IV Every 24 hours (non-standard times) 04/29/25 1016            Microbiology Results (last 7 days)       Procedure Component Value Units Date/Time    Blood culture x two cultures. Draw prior to antibiotics. [[id number]]  (Normal) Collected: 04/29/25 0758    Order Status: Completed Specimen: Blood from Peripheral, Forearm, Left Updated: 05/04/25 1801     Blood Culture No Growth After 5 Days    Blood culture x two cultures. Draw prior to antibiotics. [[id number]]  (Normal) Collected: 04/29/25 0758    Order Status: Completed Specimen: Blood from Peripheral, Antecubital, Left Updated: 05/04/25 1801     Blood Culture No Growth After 5 Days    Respiratory Infection Panel (PCR), Nasopharyngeal [[id number]] Collected: 05/02/25 1027    Order Status: Completed Specimen: Nasopharyngeal Swab Updated: 05/02/25 1545     Respiratory Infection Panel Source Nasopharyngeal Swab     Adenovirus Not Detected     Coronavirus 229E, Common Cold Virus Not Detected     Coronavirus HKU1, Common Cold Virus Not Detected     Coronavirus NL63, Common Cold Virus Not Detected     Coronavirus OC43, Common Cold Virus Not Detected     SARS-CoV2 (COVID-19) Qualitative PCR Not Detected     Human Metapneumovirus Not Detected     Human Rhinovirus/Enterovirus Not Detected     Influenza A Not Detected     Influenza B Not Detected     Parainfluenza Virus 1 Not Detected     Parainfluenza Virus 2 Not Detected     Parainfluenza Virus 3 Not Detected     Parainfluenza Virus 4 Not Detected     Respiratory Syncytial Virus Not Detected     Bordetella Parapertussis (PX2679) Not Detected     Bordetella pertussis (ptxP) Not Detected     Chlamydia pneumoniae Not Detected     Mycoplasma pneumoniae Not Detected    MRSA Screen by PCR [[id number]]     Order Status:  Sent Specimen: Nasal Swab     Culture, Respiratory with Gram Stain [[id number]]     Order Status: Sent Specimen: Respiratory

## 2025-05-06 NOTE — ASSESSMENT & PLAN NOTE
BG goal: 140-180    - Continue home insulin pump   - POCT Glucose before meals, at bedtime and at 2 am  - Hypoglycemia protocol in place      ** Please notify Endocrine for any change and/or advance in diet**  ** Please call Endocrine for any BG related issues **     Discharge Planning:   TBD. Please notify endocrinology prior to discharge.

## 2025-05-06 NOTE — PLAN OF CARE
Problem: Adult Inpatient Plan of Care  Goal: Plan of Care Review  Outcome: Progressing  Goal: Patient-Specific Goal (Individualized)  Outcome: Progressing  Goal: Absence of Hospital-Acquired Illness or Injury  Outcome: Progressing  Goal: Optimal Comfort and Wellbeing  Outcome: Progressing  Goal: Readiness for Transition of Care  Outcome: Progressing     Problem: Infection  Goal: Absence of Infection Signs and Symptoms  Outcome: Progressing     Problem: Acute Kidney Injury/Impairment  Goal: Fluid and Electrolyte Balance  Outcome: Progressing  Goal: Improved Oral Intake  Outcome: Progressing  Goal: Effective Renal Function  Outcome: Progressing     Problem: Pneumonia  Goal: Fluid Balance  Outcome: Progressing  Goal: Resolution of Infection Signs and Symptoms  Outcome: Progressing  Goal: Effective Oxygenation and Ventilation  Outcome: Progressing     Problem: Diabetes Comorbidity  Goal: Blood Glucose Level Within Targeted Range  Outcome: Progressing     Problem: Fall Injury Risk  Goal: Absence of Fall and Fall-Related Injury  Outcome: Progressing     Problem: Hypertension Acute  Goal: Blood Pressure Within Desired Range  Outcome: Progressing

## 2025-05-06 NOTE — ASSESSMENT & PLAN NOTE
Tac level 3.8 today   Continue same dose of Tacro  1mg 1-0-1 BID  Continue Prednisone 5mg   Hold Cellcept

## 2025-05-06 NOTE — ASSESSMENT & PLAN NOTE
Patient has a diagnosis of pneumonia. The cause of the pneumonia is suspected to be bacterial in etiology but organism is not known. The pneumonia is stable. The patient has the following signs/symptoms of pneumonia: cough. The patient does not have a current oxygen requirement and the patient does not have a home oxygen requirement. I have reviewed the pertinent imaging. The following cultures have been collected: Blood cultures and Sputum culture The culture results are listed below.     Current antimicrobial regimen consists of the antibiotics listed below. Will monitor patient closely and continue current treatment plan unchanged.    -Given fevers/chills and recurrent pna, reasonable to continue below abx and deescalate when indicated  -f/u sputum, blood cx    Antibiotics (From admission, onward)      Start     Stop Route Frequency Ordered    04/30/25 0900  azithromycin (ZITHROMAX) 500 mg in 0.9% NaCl 250 mL IVPB (admixture device)         05/01/25 1259 IV Every 24 hours (non-standard times) 04/29/25 1016          Microbiology Results (last 7 days)       Procedure Component Value Units Date/Time    Blood culture x two cultures. Draw prior to antibiotics. [[id number]]  (Normal) Collected: 04/29/25 0758    Order Status: Completed Specimen: Blood from Peripheral, Forearm, Left Updated: 05/04/25 1801     Blood Culture No Growth After 5 Days    Blood culture x two cultures. Draw prior to antibiotics. [[id number]]  (Normal) Collected: 04/29/25 0758    Order Status: Completed Specimen: Blood from Peripheral, Antecubital, Left Updated: 05/04/25 1801     Blood Culture No Growth After 5 Days    Respiratory Infection Panel (PCR), Nasopharyngeal [[id number]] Collected: 05/02/25 1027    Order Status: Completed Specimen: Nasopharyngeal Swab Updated: 05/02/25 1545     Respiratory Infection Panel Source Nasopharyngeal Swab     Adenovirus Not Detected     Coronavirus 229E, Common Cold Virus Not Detected     Coronavirus HKU1,  Common Cold Virus Not Detected     Coronavirus NL63, Common Cold Virus Not Detected     Coronavirus OC43, Common Cold Virus Not Detected     SARS-CoV2 (COVID-19) Qualitative PCR Not Detected     Human Metapneumovirus Not Detected     Human Rhinovirus/Enterovirus Not Detected     Influenza A Not Detected     Influenza B Not Detected     Parainfluenza Virus 1 Not Detected     Parainfluenza Virus 2 Not Detected     Parainfluenza Virus 3 Not Detected     Parainfluenza Virus 4 Not Detected     Respiratory Syncytial Virus Not Detected     Bordetella Parapertussis (SD3568) Not Detected     Bordetella pertussis (ptxP) Not Detected     Chlamydia pneumoniae Not Detected     Mycoplasma pneumoniae Not Detected    MRSA Screen by PCR [[id number]]     Order Status: Sent Specimen: Nasal Swab     Culture, Respiratory with Gram Stain [[id number]]     Order Status: Sent Specimen: Respiratory           5/1  BC x2 4/29 NGTD. vancomycins discontinued.  respiratory cultures pending.  CT chest ordered -RML GGO concerning for pneumonia possible hiatal hernia and possible anterior vertebral osteophyte near mid esophagus.  SLP consulted  and consider esophogram to assess for hiatal hernia and recurrent aspiration as a cause of repeat pneumonia. continue cefepime. completed azithromycin. denies hemoptysis this admission. last episode 2 weeks back. resumed prograf 1mg BID. continue prednisone. hold cellcept for now     Patient has a diagnosis of pneumonia. The cause of the pneumonia is suspected to be bacterial in etiology but organism is not known. The pneumonia is stable. The patient has the following signs/symptoms of pneumonia: cough. The patient does not have a current oxygen requirement and the patient does not have a home oxygen requirement. I have reviewed the pertinent imaging. The following cultures have been collected: Blood cultures and Sputum culture The culture results are listed below.     Current antimicrobial regimen consists  of the antibiotics listed below. Will monitor patient closely and continue current treatment plan unchanged.    -Given fevers/chills and recurrent pna, reasonable to continue below abx and deescalate when indicated  -f/u sputum, blood cx    Antibiotics (From admission, onward)      Start     Stop Route Frequency Ordered    04/30/25 0900  azithromycin (ZITHROMAX) 500 mg in 0.9% NaCl 250 mL IVPB (admixture device)         05/01/25 1259 IV Every 24 hours (non-standard times) 04/29/25 1016          Antibiotics (From admission, onward)      Start     Stop Route Frequency Ordered    04/30/25 0900  azithromycin (ZITHROMAX) 500 mg in 0.9% NaCl 250 mL IVPB (admixture device)         05/01/25 1259 IV Every 24 hours (non-standard times) 04/29/25 1016          5/1  PMH of DM, renal transplant 2016, CHFpEF, AF/AFL (PVI/PWI/CTI 6/2024), CVA, recurrent PNA who presents with fevers, chills.  States that symptoms are similar to last month when he was admitted for pneumonia. No other pna symptoms despite CXR changes.BC x2 4/29 NGTD. vancomycins discontinued.  respiratory cultures pending.  CT chest ordered -RML GGO concerning for pneumonia possible hiatal hernia and possible anterior vertebral osteophyte near mid esophagus.  SLP consulted  and  barium esophogram to assess for hiatal hernia (second episode of pneumonia since March 2025) and recurrent aspiration as a cause of repeat pneumonia. continue cefepime. completed azithromycin. denies hemoptysis this admission. last episode 2 weeks back. resumed prograf 1mg BID. continue prednisone. hold cellcept for now     Patient has a diagnosis of pneumonia. The cause of the pneumonia is suspected to be bacterial in etiology but organism is not known. The pneumonia is stable. The patient has the following signs/symptoms of pneumonia: cough. The patient does not have a current oxygen requirement and the patient does not have a home oxygen requirement. I have reviewed the pertinent imaging.  The following cultures have been collected: Blood cultures and Sputum culture The culture results are listed below.     Current antimicrobial regimen consists of the antibiotics listed below. Will monitor patient closely and continue current treatment plan unchanged.    -Given fevers/chills and recurrent pna, reasonable to continue below abx and deescalate when indicated  -f/u sputum, blood cx    Antibiotics (From admission, onward)      Start     Stop Route Frequency Ordered    04/30/25 0900  azithromycin (ZITHROMAX) 500 mg in 0.9% NaCl 250 mL IVPB (admixture device)         05/01/25 1259 IV Every 24 hours (non-standard times) 04/29/25 1016          Microbiology Results (last 7 days)       Procedure Component Value Units Date/Time    Blood culture x two cultures. Draw prior to antibiotics. [[id number]]  (Normal) Collected: 04/29/25 0758    Order Status: Completed Specimen: Blood from Peripheral, Forearm, Left Updated: 05/04/25 1801     Blood Culture No Growth After 5 Days    Blood culture x two cultures. Draw prior to antibiotics. [[id number]]  (Normal) Collected: 04/29/25 0758    Order Status: Completed Specimen: Blood from Peripheral, Antecubital, Left Updated: 05/04/25 1801     Blood Culture No Growth After 5 Days    Respiratory Infection Panel (PCR), Nasopharyngeal [[id number]] Collected: 05/02/25 1027    Order Status: Completed Specimen: Nasopharyngeal Swab Updated: 05/02/25 1545     Respiratory Infection Panel Source Nasopharyngeal Swab     Adenovirus Not Detected     Coronavirus 229E, Common Cold Virus Not Detected     Coronavirus HKU1, Common Cold Virus Not Detected     Coronavirus NL63, Common Cold Virus Not Detected     Coronavirus OC43, Common Cold Virus Not Detected     SARS-CoV2 (COVID-19) Qualitative PCR Not Detected     Human Metapneumovirus Not Detected     Human Rhinovirus/Enterovirus Not Detected     Influenza A Not Detected     Influenza B Not Detected     Parainfluenza Virus 1 Not Detected      Parainfluenza Virus 2 Not Detected     Parainfluenza Virus 3 Not Detected     Parainfluenza Virus 4 Not Detected     Respiratory Syncytial Virus Not Detected     Bordetella Parapertussis (CV2303) Not Detected     Bordetella pertussis (ptxP) Not Detected     Chlamydia pneumoniae Not Detected     Mycoplasma pneumoniae Not Detected    MRSA Screen by PCR [[id number]]     Order Status: Sent Specimen: Nasal Swab     Culture, Respiratory with Gram Stain [[id number]]     Order Status: Sent Specimen: Respiratory           5/1  BC x2 4/29 NGTD. vancomycins discontinued.  respiratory cultures pending.  CT chest ordered -L GGO concerning for pneumonia possible hiatal hernia and possible anterior vertebral osteophyte near mid esophagus.  SLP consulted  and consider esophogram to assess for hiatal hernia and recurrent aspiration as a cause of repeat pneumonia. continue cefepime. completed azithromycin. denies hemoptysis this admission. last episode 2 weeks back. resumed prograf 1mg BID. continue prednisone. hold cellcept for now     Patient has a diagnosis of pneumonia. The cause of the pneumonia is suspected to be bacterial in etiology but organism is not known. The pneumonia is stable. The patient has the following signs/symptoms of pneumonia: cough. The patient does not have a current oxygen requirement and the patient does not have a home oxygen requirement. I have reviewed the pertinent imaging. The following cultures have been collected: Blood cultures and Sputum culture The culture results are listed below.     Current antimicrobial regimen consists of the antibiotics listed below. Will monitor patient closely and continue current treatment plan unchanged.    -Given fevers/chills and recurrent pna, reasonable to continue below abx and deescalate when indicated  -f/u sputum, blood cx    Antibiotics (From admission, onward)      Start     Stop Route Frequency Ordered    04/30/25 0900  azithromycin (ZITHROMAX) 500 mg in  0.9% NaCl 250 mL IVPB (admixture device)         05/01/25 1259 IV Every 24 hours (non-standard times) 04/29/25 1016          Antibiotics (From admission, onward)      Start     Stop Route Frequency Ordered    04/30/25 0900  azithromycin (ZITHROMAX) 500 mg in 0.9% NaCl 250 mL IVPB (admixture device)         05/01/25 1259 IV Every 24 hours (non-standard times) 04/29/25 1016            Patient has a diagnosis of pneumonia. The cause of the pneumonia is suspected to be bacterial in etiology but organism is not known. The pneumonia is stable. The patient has the following signs/symptoms of pneumonia: cough. The patient does not have a current oxygen requirement and the patient does not have a home oxygen requirement. I have reviewed the pertinent imaging. The following cultures have been collected: Blood cultures and Sputum culture The culture results are listed below.     Current antimicrobial regimen consists of the antibiotics listed below. Will monitor patient closely and continue current treatment plan unchanged.    -Given fevers/chills and recurrent pna, reasonable to continue below abx and deescalate when indicated  -f/u sputum, blood cx    Antibiotics (From admission, onward)      Start     Stop Route Frequency Ordered    04/30/25 0900  azithromycin (ZITHROMAX) 500 mg in 0.9% NaCl 250 mL IVPB (admixture device)         05/01/25 1259 IV Every 24 hours (non-standard times) 04/29/25 1016          Microbiology Results (last 7 days)       Procedure Component Value Units Date/Time    Blood culture x two cultures. Draw prior to antibiotics. [[id number]]  (Normal) Collected: 04/29/25 0758    Order Status: Completed Specimen: Blood from Peripheral, Forearm, Left Updated: 05/04/25 1801     Blood Culture No Growth After 5 Days    Blood culture x two cultures. Draw prior to antibiotics. [[id number]]  (Normal) Collected: 04/29/25 0758    Order Status: Completed Specimen: Blood from Peripheral, Antecubital, Left Updated:  05/04/25 1801     Blood Culture No Growth After 5 Days    Respiratory Infection Panel (PCR), Nasopharyngeal [[id number]] Collected: 05/02/25 1027    Order Status: Completed Specimen: Nasopharyngeal Swab Updated: 05/02/25 1545     Respiratory Infection Panel Source Nasopharyngeal Swab     Adenovirus Not Detected     Coronavirus 229E, Common Cold Virus Not Detected     Coronavirus HKU1, Common Cold Virus Not Detected     Coronavirus NL63, Common Cold Virus Not Detected     Coronavirus OC43, Common Cold Virus Not Detected     SARS-CoV2 (COVID-19) Qualitative PCR Not Detected     Human Metapneumovirus Not Detected     Human Rhinovirus/Enterovirus Not Detected     Influenza A Not Detected     Influenza B Not Detected     Parainfluenza Virus 1 Not Detected     Parainfluenza Virus 2 Not Detected     Parainfluenza Virus 3 Not Detected     Parainfluenza Virus 4 Not Detected     Respiratory Syncytial Virus Not Detected     Bordetella Parapertussis ([id number]) Not Detected     Bordetella pertussis (ptxP) Not Detected     Chlamydia pneumoniae Not Detected     Mycoplasma pneumoniae Not Detected    MRSA Screen by PCR [[id number]]     Order Status: Sent Specimen: Nasal Swab     Culture, Respiratory with Gram Stain [[id number]]     Order Status: Sent Specimen: Respiratory           5/1  BC x2 4/29 NGTD. vancomycins discontinued.  respiratory cultures pending.  CT chest ordered -RML GGO concerning for pneumonia possible hiatal hernia and possible anterior vertebral osteophyte near mid esophagus.  SLP consulted  and consider esophogram to assess for hiatal hernia and recurrent aspiration as a cause of repeat pneumonia. continue cefepime. completed azithromycin. denies hemoptysis this admission. last episode 2 weeks back. resumed prograf 1mg BID. continue prednisone. hold cellcept for now     Patient has a diagnosis of pneumonia. The cause of the pneumonia is suspected to be bacterial in etiology but organism is not known. The pneumonia  is stable. The patient has the following signs/symptoms of pneumonia: cough. The patient does not have a current oxygen requirement and the patient does not have a home oxygen requirement. I have reviewed the pertinent imaging. The following cultures have been collected: Blood cultures and Sputum culture The culture results are listed below.     Current antimicrobial regimen consists of the antibiotics listed below. Will monitor patient closely and continue current treatment plan unchanged.    -Given fevers/chills and recurrent pna, reasonable to continue below abx and deescalate when indicated  -f/u sputum, blood cx    Antibiotics (From admission, onward)      Start     Stop Route Frequency Ordered    04/30/25 0900  azithromycin (ZITHROMAX) 500 mg in 0.9% NaCl 250 mL IVPB (admixture device)         05/01/25 1259 IV Every 24 hours (non-standard times) 04/29/25 1016          Antibiotics (From admission, onward)      Start     Stop Route Frequency Ordered    04/30/25 0900  azithromycin (ZITHROMAX) 500 mg in 0.9% NaCl 250 mL IVPB (admixture device)         05/01/25 1259 IV Every 24 hours (non-standard times) 04/29/25 1016          5/1  PMH of DM, renal transplant 2016, CHFpEF, AF/AFL (PVI/PWI/CTI 6/2024), CVA, recurrent PNA who presents with fevers, chills.  States that symptoms are similar to last month when he was admitted for pneumonia. No other pna symptoms despite CXR changes.BC x2 4/29 NGTD. vancomycins discontinued.  respiratory cultures pending.  CT chest ordered -RML GGO concerning for pneumonia possible hiatal hernia and possible anterior vertebral osteophyte near mid esophagus.  SLP consulted  and  barium esophogram to assess for hiatal hernia (second episode of pneumonia since March 2025) and recurrent aspiration as a cause of repeat pneumonia. continue cefepime. completed azithromycin. denies hemoptysis this admission. last episode 2 weeks back. resumed prograf 1mg BID. continue prednisone. hold cellcept  for now     Patient has a diagnosis of pneumonia. The cause of the pneumonia is suspected to be bacterial in etiology but organism is not known. The pneumonia is stable. The patient has the following signs/symptoms of pneumonia: cough. The patient does not have a current oxygen requirement and the patient does not have a home oxygen requirement. I have reviewed the pertinent imaging. The following cultures have been collected: Blood cultures and Sputum culture The culture results are listed below.     Current antimicrobial regimen consists of the antibiotics listed below. Will monitor patient closely and continue current treatment plan unchanged.    -Given fevers/chills and recurrent pna, reasonable to continue below abx and deescalate when indicated  -f/u sputum, blood cx    Antibiotics (From admission, onward)      Start     Stop Route Frequency Ordered    04/30/25 0900  azithromycin (ZITHROMAX) 500 mg in 0.9% NaCl 250 mL IVPB (admixture device)         05/01/25 1259 IV Every 24 hours (non-standard times) 04/29/25 1016          Microbiology Results (last 7 days)       Procedure Component Value Units Date/Time    Blood culture x two cultures. Draw prior to antibiotics. [[id number]]  (Normal) Collected: 04/29/25 0758    Order Status: Completed Specimen: Blood from Peripheral, Forearm, Left Updated: 05/04/25 1801     Blood Culture No Growth After 5 Days    Blood culture x two cultures. Draw prior to antibiotics. [[id number]]  (Normal) Collected: 04/29/25 0758    Order Status: Completed Specimen: Blood from Peripheral, Antecubital, Left Updated: 05/04/25 1801     Blood Culture No Growth After 5 Days    Respiratory Infection Panel (PCR), Nasopharyngeal [[id number]] Collected: 05/02/25 1027    Order Status: Completed Specimen: Nasopharyngeal Swab Updated: 05/02/25 1545     Respiratory Infection Panel Source Nasopharyngeal Swab     Adenovirus Not Detected     Coronavirus 229E, Common Cold Virus Not Detected      Coronavirus HKU1, Common Cold Virus Not Detected     Coronavirus NL63, Common Cold Virus Not Detected     Coronavirus OC43, Common Cold Virus Not Detected     SARS-CoV2 (COVID-19) Qualitative PCR Not Detected     Human Metapneumovirus Not Detected     Human Rhinovirus/Enterovirus Not Detected     Influenza A Not Detected     Influenza B Not Detected     Parainfluenza Virus 1 Not Detected     Parainfluenza Virus 2 Not Detected     Parainfluenza Virus 3 Not Detected     Parainfluenza Virus 4 Not Detected     Respiratory Syncytial Virus Not Detected     Bordetella Parapertussis (FM4571) Not Detected     Bordetella pertussis (ptxP) Not Detected     Chlamydia pneumoniae Not Detected     Mycoplasma pneumoniae Not Detected    MRSA Screen by PCR [[id number]]     Order Status: Sent Specimen: Nasal Swab     Culture, Respiratory with Gram Stain [[id number]]     Order Status: Sent Specimen: Respiratory           5/1  BC x2 4/29 NGTD. vancomycins discontinued.  respiratory cultures pending.  CT chest ordered -RML GGO concerning for pneumonia possible hiatal hernia and possible anterior vertebral osteophyte near mid esophagus.  SLP consulted  and consider esophogram to assess for hiatal hernia and recurrent aspiration as a cause of repeat pneumonia. continue cefepime. completed azithromycin. denies hemoptysis this admission. last episode 2 weeks back. resumed prograf 1mg BID. continue prednisone. hold cellcept for now     Patient has a diagnosis of pneumonia. The cause of the pneumonia is suspected to be bacterial in etiology but organism is not known. The pneumonia is stable. The patient has the following signs/symptoms of pneumonia: cough. The patient does not have a current oxygen requirement and the patient does not have a home oxygen requirement. I have reviewed the pertinent imaging. The following cultures have been collected: Blood cultures and Sputum culture The culture results are listed below.     Current antimicrobial  regimen consists of the antibiotics listed below. Will monitor patient closely and continue current treatment plan unchanged.    -Given fevers/chills and recurrent pna, reasonable to continue below abx and deescalate when indicated  -f/u sputum, blood cx    Antibiotics (From admission, onward)      Start     Stop Route Frequency Ordered    04/30/25 0900  azithromycin (ZITHROMAX) 500 mg in 0.9% NaCl 250 mL IVPB (admixture device)         05/01/25 1259 IV Every 24 hours (non-standard times) 04/29/25 1016          Antibiotics (From admission, onward)      Start     Stop Route Frequency Ordered    04/30/25 0900  azithromycin (ZITHROMAX) 500 mg in 0.9% NaCl 250 mL IVPB (admixture device)         05/01/25 1259 IV Every 24 hours (non-standard times) 04/29/25 1016

## 2025-05-06 NOTE — ASSESSMENT & PLAN NOTE
Patient's FSGs are controlled on current medication regimen.  Last A1c reviewed-   Lab Results   Component Value Date    HGBA1C 8.3 (H) 03/27/2025     Most recent fingerstick glucose reviewed-   Recent Labs   Lab 05/05/25  1605 05/05/25  2219 05/05/25  2305   POCTGLUCOSE 221* 57* 123*     Current correctional scale  Medium  Maintain anti-hyperglycemic dose as follows-   Antihyperglycemics (From admission, onward)      Start     Stop Route Frequency Ordered    05/06/25 1415  insulin aspart U-100 insulin pump from home        Question Answer Comment   Target number 110    Basal Rate #1 0.6    Basal rate #1 time [id number]        -- SubQ Continuous 05/06/25 1303    05/06/25 1402  insulin aspart U-100 insulin pump from home 0-10 Units        Question Answer Comment   Target number 110    Carbohydrate coverage #1 7.5    Carbohydrate coverage #1 time [id number]    Sensitivity #1 40    Sensitivity #1 time 1712-2927        -- SubQ As needed (PRN) 05/06/25 1303    04/29/25 1115  empagliflozin (Jardiance) tablet 10 mg        Question Answer Comment   Does this patient have a diagnosis of heart failure? Yes    Does this patient have type 1 diabetes or diabetic ketoacidosis? No    Does this patient have symptomatic hypotension? No    Is the patient NPO or pending major surgery in next 3 days or less? No        -- Oral Daily 04/29/25 1016          Hold Oral hypoglycemics while patient is in the hospital.    Blood glucose acceptable

## 2025-05-07 LAB
ABSOLUTE EOSINOPHIL (OHS): 0.11 K/UL
ABSOLUTE MONOCYTE (OHS): 1.36 K/UL (ref 0.3–1)
ABSOLUTE NEUTROPHIL COUNT (OHS): 2.84 K/UL (ref 1.8–7.7)
ANION GAP (OHS): 9 MMOL/L (ref 8–16)
ANISOCYTOSIS BLD QL SMEAR: SLIGHT
BASOPHILS # BLD AUTO: 0.03 K/UL
BASOPHILS NFR BLD AUTO: 0.5 %
BUN SERPL-MCNC: 49 MG/DL (ref 8–23)
CALCIUM SERPL-MCNC: 8.7 MG/DL (ref 8.7–10.5)
CHLORIDE SERPL-SCNC: 105 MMOL/L (ref 95–110)
CO2 SERPL-SCNC: 23 MMOL/L (ref 23–29)
CREAT SERPL-MCNC: 3.4 MG/DL (ref 0.5–1.4)
ERYTHROCYTE [DISTWIDTH] IN BLOOD BY AUTOMATED COUNT: 19.4 % (ref 11.5–14.5)
GFR SERPLBLD CREATININE-BSD FMLA CKD-EPI: 19 ML/MIN/1.73/M2
GLUCOSE SERPL-MCNC: 98 MG/DL (ref 70–110)
HCT VFR BLD AUTO: 31.1 % (ref 40–54)
HGB BLD-MCNC: 9.1 GM/DL (ref 14–18)
IMM GRANULOCYTES # BLD AUTO: 0.03 K/UL (ref 0–0.04)
IMM GRANULOCYTES NFR BLD AUTO: 0.5 % (ref 0–0.5)
LARGE/GIANT PLATELETS (OHS): PRESENT
LYMPHOCYTES # BLD AUTO: 1.59 K/UL (ref 1–4.8)
MAGNESIUM SERPL-MCNC: 1.6 MG/DL (ref 1.6–2.6)
MCH RBC QN AUTO: 22.8 PG (ref 27–31)
MCHC RBC AUTO-ENTMCNC: 29.3 G/DL (ref 32–36)
MCV RBC AUTO: 78 FL (ref 82–98)
NUCLEATED RBC (/100WBC) (OHS): 0 /100 WBC
OVALOCYTES BLD QL SMEAR: ABNORMAL
PHOSPHATE SERPL-MCNC: 4.2 MG/DL (ref 2.7–4.5)
PLATELET # BLD AUTO: 149 K/UL (ref 150–450)
PLATELET BLD QL SMEAR: ABNORMAL
PMV BLD AUTO: ABNORMAL FL
POCT GLUCOSE: 188 MG/DL (ref 70–110)
POCT GLUCOSE: 192 MG/DL (ref 70–110)
POCT GLUCOSE: 84 MG/DL (ref 70–110)
POCT GLUCOSE: 85 MG/DL (ref 70–110)
POIKILOCYTOSIS BLD QL SMEAR: SLIGHT
POTASSIUM SERPL-SCNC: 3.5 MMOL/L (ref 3.5–5.1)
RBC # BLD AUTO: 4 M/UL (ref 4.6–6.2)
RELATIVE EOSINOPHIL (OHS): 1.8 %
RELATIVE LYMPHOCYTE (OHS): 26.7 % (ref 18–48)
RELATIVE MONOCYTE (OHS): 22.8 % (ref 4–15)
RELATIVE NEUTROPHIL (OHS): 47.7 % (ref 38–73)
SCHISTOCYTES BLD QL SMEAR: ABNORMAL
SCHISTOCYTES BLD QL SMEAR: PRESENT
SODIUM SERPL-SCNC: 137 MMOL/L (ref 136–145)
TACROLIMUS BLD-MCNC: 5.5 NG/ML (ref 5–15)
WBC # BLD AUTO: 5.96 K/UL (ref 3.9–12.7)

## 2025-05-07 PROCEDURE — 83735 ASSAY OF MAGNESIUM: CPT | Mod: HCNC | Performed by: STUDENT IN AN ORGANIZED HEALTH CARE EDUCATION/TRAINING PROGRAM

## 2025-05-07 PROCEDURE — 84100 ASSAY OF PHOSPHORUS: CPT | Mod: HCNC | Performed by: STUDENT IN AN ORGANIZED HEALTH CARE EDUCATION/TRAINING PROGRAM

## 2025-05-07 PROCEDURE — 25000003 PHARM REV CODE 250: Mod: HCNC | Performed by: HOSPITALIST

## 2025-05-07 PROCEDURE — 99233 SBSQ HOSP IP/OBS HIGH 50: CPT | Mod: HCNC,GC,, | Performed by: STUDENT IN AN ORGANIZED HEALTH CARE EDUCATION/TRAINING PROGRAM

## 2025-05-07 PROCEDURE — 99232 SBSQ HOSP IP/OBS MODERATE 35: CPT | Mod: HCNC,,,

## 2025-05-07 PROCEDURE — 63600175 PHARM REV CODE 636 W HCPCS: Mod: HCNC | Performed by: STUDENT IN AN ORGANIZED HEALTH CARE EDUCATION/TRAINING PROGRAM

## 2025-05-07 PROCEDURE — 25000003 PHARM REV CODE 250: Mod: HCNC

## 2025-05-07 PROCEDURE — 21400001 HC TELEMETRY ROOM: Mod: HCNC

## 2025-05-07 PROCEDURE — 25000003 PHARM REV CODE 250: Mod: HCNC | Performed by: STUDENT IN AN ORGANIZED HEALTH CARE EDUCATION/TRAINING PROGRAM

## 2025-05-07 PROCEDURE — 80048 BASIC METABOLIC PNL TOTAL CA: CPT | Mod: HCNC | Performed by: STUDENT IN AN ORGANIZED HEALTH CARE EDUCATION/TRAINING PROGRAM

## 2025-05-07 PROCEDURE — 63600175 PHARM REV CODE 636 W HCPCS: Mod: HCNC | Performed by: HOSPITALIST

## 2025-05-07 PROCEDURE — 85025 COMPLETE CBC W/AUTO DIFF WBC: CPT | Mod: HCNC | Performed by: STUDENT IN AN ORGANIZED HEALTH CARE EDUCATION/TRAINING PROGRAM

## 2025-05-07 PROCEDURE — 36415 COLL VENOUS BLD VENIPUNCTURE: CPT | Mod: HCNC | Performed by: STUDENT IN AN ORGANIZED HEALTH CARE EDUCATION/TRAINING PROGRAM

## 2025-05-07 PROCEDURE — 80197 ASSAY OF TACROLIMUS: CPT | Mod: HCNC | Performed by: STUDENT IN AN ORGANIZED HEALTH CARE EDUCATION/TRAINING PROGRAM

## 2025-05-07 PROCEDURE — 63600175 PHARM REV CODE 636 W HCPCS: Mod: HCNC | Performed by: INTERNAL MEDICINE

## 2025-05-07 RX ORDER — POTASSIUM CHLORIDE 20 MEQ/1
20 TABLET, EXTENDED RELEASE ORAL ONCE
Status: COMPLETED | OUTPATIENT
Start: 2025-05-07 | End: 2025-05-07

## 2025-05-07 RX ORDER — VALSARTAN 160 MG/1
320 TABLET ORAL DAILY
Status: DISCONTINUED | OUTPATIENT
Start: 2025-05-07 | End: 2025-05-08 | Stop reason: HOSPADM

## 2025-05-07 RX ORDER — HYDROCHLOROTHIAZIDE 25 MG/1
25 TABLET ORAL DAILY
Status: DISCONTINUED | OUTPATIENT
Start: 2025-05-08 | End: 2025-05-08 | Stop reason: HOSPADM

## 2025-05-07 RX ORDER — HYDROCHLOROTHIAZIDE 25 MG/1
25 TABLET ORAL DAILY
Qty: 30 TABLET | Refills: 11 | Status: SHIPPED | OUTPATIENT
Start: 2025-05-08 | End: 2026-05-08

## 2025-05-07 RX ADMIN — SODIUM CHLORIDE, POTASSIUM CHLORIDE, SODIUM LACTATE AND CALCIUM CHLORIDE 500 ML: 600; 310; 30; 20 INJECTION, SOLUTION INTRAVENOUS at 01:05

## 2025-05-07 RX ADMIN — TACROLIMUS 1 MG: 1 CAPSULE ORAL at 09:05

## 2025-05-07 RX ADMIN — PREDNISONE 5 MG: 5 TABLET ORAL at 09:05

## 2025-05-07 RX ADMIN — DOXAZOSIN 8 MG: 4 TABLET ORAL at 09:05

## 2025-05-07 RX ADMIN — GABAPENTIN 300 MG: 300 CAPSULE ORAL at 09:05

## 2025-05-07 RX ADMIN — Medication 1 CAPSULE: at 09:05

## 2025-05-07 RX ADMIN — GABAPENTIN 300 MG: 300 CAPSULE ORAL at 01:05

## 2025-05-07 RX ADMIN — ATORVASTATIN CALCIUM 80 MG: 40 TABLET, FILM COATED ORAL at 09:05

## 2025-05-07 RX ADMIN — VALSARTAN 320 MG: 160 TABLET, FILM COATED ORAL at 01:05

## 2025-05-07 RX ADMIN — HYDRALAZINE HYDROCHLORIDE 100 MG: 50 TABLET ORAL at 10:05

## 2025-05-07 RX ADMIN — TORSEMIDE 40 MG: 20 TABLET ORAL at 09:05

## 2025-05-07 RX ADMIN — ACETAMINOPHEN 1000 MG: 500 TABLET ORAL at 10:05

## 2025-05-07 RX ADMIN — EPLERENONE 50 MG: 25 TABLET, FILM COATED ORAL at 09:05

## 2025-05-07 RX ADMIN — RIVAROXABAN 15 MG: 15 TABLET, FILM COATED ORAL at 04:05

## 2025-05-07 RX ADMIN — TACROLIMUS 1 MG: 1 CAPSULE ORAL at 06:05

## 2025-05-07 RX ADMIN — HYDRALAZINE HYDROCHLORIDE 100 MG: 50 TABLET ORAL at 01:05

## 2025-05-07 RX ADMIN — ACETAMINOPHEN 1000 MG: 500 TABLET ORAL at 09:05

## 2025-05-07 RX ADMIN — GABAPENTIN 300 MG: 300 CAPSULE ORAL at 10:05

## 2025-05-07 RX ADMIN — HYDRALAZINE HYDROCHLORIDE 100 MG: 50 TABLET ORAL at 06:05

## 2025-05-07 RX ADMIN — EMPAGLIFLOZIN 10 MG: 10 TABLET, FILM COATED ORAL at 09:05

## 2025-05-07 RX ADMIN — POTASSIUM CHLORIDE 20 MEQ: 1500 TABLET, EXTENDED RELEASE ORAL at 09:05

## 2025-05-07 NOTE — ASSESSMENT & PLAN NOTE
Creatine stable for now. BMP reviewed- noted Estimated Creatinine Clearance: 23.8 mL/min (A) (based on SCr of 3.4 mg/dL (H)). according to latest data. Based on current GFR, CKD stage is stage 3 - GFR 30-59.  Monitor UOP and serial BMP and adjust therapy as needed. Renally dose meds. Avoid nephrotoxic medications and procedures.    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 monitor BP  5/7 Cr trended up to 3.4. KTM f/u  -recs 500cc bolus of LR, changing his torsemide back to HCTZ 25mg daily, and restarting his valsartan. monitor renal function in AM

## 2025-05-07 NOTE — SUBJECTIVE & OBJECTIVE
"Interval HPI:   No acute events overnight. Patient in room 632/632 A. Blood glucose stable. BG at goal on current insulin regimen (Home Insulin Pump). Steroid use- Prednisone 5 mg QD.      Renal function-   Lab Results   Component Value Date    CREATININE 3.4 (H) 2025        Vasopressors-  None     Diet Consistent Carbohydrate 2000 Calories (up to 75 gm per meal)     Eatin%  Nausea: No  Hypoglycemia and intervention: No  Fever: No  TPN and/or TF: No    BP (!) 158/84 (Patient Position: Lying)   Pulse 75   Temp 98.1 °F (36.7 °C) (Oral)   Resp 18   Ht 6' 1" (1.854 m)   Wt 90.7 kg (200 lb)   SpO2 98%   BMI 26.39 kg/m²     Labs Reviewed and Include    Recent Labs   Lab 25  0328   GLU 98   CALCIUM 8.7      K 3.5   CO2 23      BUN 49*   CREATININE 3.4*     Lab Results   Component Value Date    WBC 5.96 2025    HGB 9.1 (L) 2025    HCT 31.1 (L) 2025    MCV 78 (L) 2025     (L) 2025     No results for input(s): "TSH", "FREET4" in the last 168 hours.  Lab Results   Component Value Date    HGBA1C 8.3 (H) 2025       Nutritional status:   Body mass index is 26.39 kg/m².  Lab Results   Component Value Date    ALBUMIN 2.6 (L) 2025    ALBUMIN 2.8 (L) 2025    ALBUMIN 3.1 (L) 2025     No results found for: "PREALBUMIN"    Estimated Creatinine Clearance: 23.8 mL/min (A) (based on SCr of 3.4 mg/dL (H)).    Accu-Checks  Recent Labs     25  0816 25  1210 25  1605 25  2219 25  2305 25  0814 25  1008 25  1149 25  1623 25  2148   POCTGLUCOSE 161* 234* 221* 57* 123* 64* 244* 172* 135* 115*       Current Medications and/or Treatments Impacting Glycemic Control  Immunotherapy:    Immunosuppressants           Stop Route Frequency     tacrolimus capsule 1 mg         -- Oral 2 times daily          Steroids:   Hormones (From admission, onward)      Start     Stop Route Frequency Ordered    " 04/29/25 1115  predniSONE tablet 5 mg         -- Oral Daily 04/29/25 1016    04/29/25 1112  melatonin tablet 6 mg         -- Oral Nightly PRN 04/29/25 1016          Pressors:    Autonomic Drugs (From admission, onward)      None          Hyperglycemia/Diabetes Medications:   Antihyperglycemics (From admission, onward)      Start     Stop Route Frequency Ordered    05/06/25 1415  insulin aspart U-100 insulin pump from home        Question Answer Comment   Target number 110    Basal Rate #1 0.6    Basal rate #1 time 3756-1321        -- SubQ Continuous 05/06/25 1303    05/06/25 1402  insulin aspart U-100 insulin pump from home 0-10 Units        Question Answer Comment   Target number 110    Carbohydrate coverage #1 7.5    Carbohydrate coverage #1 time 8016-3576    Sensitivity #1 40    Sensitivity #1 time 0928-4028        -- SubQ As needed (PRN) 05/06/25 1303    04/29/25 1115  empagliflozin (Jardiance) tablet 10 mg        Question Answer Comment   Does this patient have a diagnosis of heart failure? Yes    Does this patient have type 1 diabetes or diabetic ketoacidosis? No    Does this patient have symptomatic hypotension? No    Is the patient NPO or pending major surgery in next 3 days or less? No        -- Oral Daily 04/29/25 1016

## 2025-05-07 NOTE — ASSESSMENT & PLAN NOTE
Patient has a diagnosis of pneumonia. The cause of the pneumonia is suspected to be bacterial in etiology but organism is not known. The pneumonia is stable. The patient has the following signs/symptoms of pneumonia: cough. The patient does not have a current oxygen requirement and the patient does not have a home oxygen requirement. I have reviewed the pertinent imaging. The following cultures have been collected: Blood cultures and Sputum culture The culture results are listed below.     Current antimicrobial regimen consists of the antibiotics listed below. Will monitor patient closely and continue current treatment plan unchanged.    -Given fevers/chills and recurrent pna, reasonable to continue below abx and deescalate when indicated  -f/u sputum, blood cx    Antibiotics (From admission, onward)      Start     Stop Route Frequency Ordered    04/30/25 0900  azithromycin (ZITHROMAX) 500 mg in 0.9% NaCl 250 mL IVPB (admixture device)         05/01/25 1259 IV Every 24 hours (non-standard times) 04/29/25 1016          Microbiology Results (last 7 days)       Procedure Component Value Units Date/Time    Blood culture x two cultures. Draw prior to antibiotics. [[id number]]  (Normal) Collected: 04/29/25 0758    Order Status: Completed Specimen: Blood from Peripheral, Forearm, Left Updated: 05/04/25 1801     Blood Culture No Growth After 5 Days    Blood culture x two cultures. Draw prior to antibiotics. [[id number]]  (Normal) Collected: 04/29/25 0758    Order Status: Completed Specimen: Blood from Peripheral, Antecubital, Left Updated: 05/04/25 1801     Blood Culture No Growth After 5 Days    Respiratory Infection Panel (PCR), Nasopharyngeal [[id number]] Collected: 05/02/25 1027    Order Status: Completed Specimen: Nasopharyngeal Swab Updated: 05/02/25 1545     Respiratory Infection Panel Source Nasopharyngeal Swab     Adenovirus Not Detected     Coronavirus 229E, Common Cold Virus Not Detected     Coronavirus HKU1,  Common Cold Virus Not Detected     Coronavirus NL63, Common Cold Virus Not Detected     Coronavirus OC43, Common Cold Virus Not Detected     SARS-CoV2 (COVID-19) Qualitative PCR Not Detected     Human Metapneumovirus Not Detected     Human Rhinovirus/Enterovirus Not Detected     Influenza A Not Detected     Influenza B Not Detected     Parainfluenza Virus 1 Not Detected     Parainfluenza Virus 2 Not Detected     Parainfluenza Virus 3 Not Detected     Parainfluenza Virus 4 Not Detected     Respiratory Syncytial Virus Not Detected     Bordetella Parapertussis ([id number]) Not Detected     Bordetella pertussis (ptxP) Not Detected     Chlamydia pneumoniae Not Detected     Mycoplasma pneumoniae Not Detected          5/1  BC x2 4/29 NGTD. vancomycins discontinued.  respiratory cultures pending.  CT chest ordered -RML GGO concerning for pneumonia possible hiatal hernia and possible anterior vertebral osteophyte near mid esophagus.  SLP consulted  and consider esophogram to assess for hiatal hernia and recurrent aspiration as a cause of repeat pneumonia. continue cefepime. completed azithromycin. denies hemoptysis this admission. last episode 2 weeks back. resumed prograf 1mg BID. continue prednisone. hold cellcept for now     Patient has a diagnosis of pneumonia. The cause of the pneumonia is suspected to be bacterial in etiology but organism is not known. The pneumonia is stable. The patient has the following signs/symptoms of pneumonia: cough. The patient does not have a current oxygen requirement and the patient does not have a home oxygen requirement. I have reviewed the pertinent imaging. The following cultures have been collected: Blood cultures and Sputum culture The culture results are listed below.     Current antimicrobial regimen consists of the antibiotics listed below. Will monitor patient closely and continue current treatment plan unchanged.    -Given fevers/chills and recurrent pna, reasonable to continue below  abx and deescalate when indicated  -f/u sputum, blood cx    Antibiotics (From admission, onward)      Start     Stop Route Frequency Ordered    04/30/25 0900  azithromycin (ZITHROMAX) 500 mg in 0.9% NaCl 250 mL IVPB (admixture device)         05/01/25 1259 IV Every 24 hours (non-standard times) 04/29/25 1016          Antibiotics (From admission, onward)      Start     Stop Route Frequency Ordered    04/30/25 0900  azithromycin (ZITHROMAX) 500 mg in 0.9% NaCl 250 mL IVPB (admixture device)         05/01/25 1259 IV Every 24 hours (non-standard times) 04/29/25 1016          5/1  PMH of DM, renal transplant 2016, CHFpEF, AF/AFL (PVI/PWI/CTI 6/2024), CVA, recurrent PNA who presents with fevers, chills.  States that symptoms are similar to last month when he was admitted for pneumonia. No other pna symptoms despite CXR changes.BC x2 4/29 NGTD. vancomycins discontinued.  respiratory cultures pending.  CT chest ordered -RML GGO concerning for pneumonia possible hiatal hernia and possible anterior vertebral osteophyte near mid esophagus.  SLP consulted  and  barium esophogram to assess for hiatal hernia (second episode of pneumonia since March 2025) and recurrent aspiration as a cause of repeat pneumonia. continue cefepime. completed azithromycin. denies hemoptysis this admission. last episode 2 weeks back. resumed prograf 1mg BID. continue prednisone. hold cellcept for now     Patient has a diagnosis of pneumonia. The cause of the pneumonia is suspected to be bacterial in etiology but organism is not known. The pneumonia is stable. The patient has the following signs/symptoms of pneumonia: cough. The patient does not have a current oxygen requirement and the patient does not have a home oxygen requirement. I have reviewed the pertinent imaging. The following cultures have been collected: Blood cultures and Sputum culture The culture results are listed below.     Current antimicrobial regimen consists of the antibiotics  listed below. Will monitor patient closely and continue current treatment plan unchanged.    -Given fevers/chills and recurrent pna, reasonable to continue below abx and deescalate when indicated  -f/u sputum, blood cx    Antibiotics (From admission, onward)      Start     Stop Route Frequency Ordered    04/30/25 0900  azithromycin (ZITHROMAX) 500 mg in 0.9% NaCl 250 mL IVPB (admixture device)         05/01/25 1259 IV Every 24 hours (non-standard times) 04/29/25 1016          Microbiology Results (last 7 days)       Procedure Component Value Units Date/Time    Blood culture x two cultures. Draw prior to antibiotics. [[id number]]  (Normal) Collected: 04/29/25 0758    Order Status: Completed Specimen: Blood from Peripheral, Forearm, Left Updated: 05/04/25 1801     Blood Culture No Growth After 5 Days    Blood culture x two cultures. Draw prior to antibiotics. [[id number]]  (Normal) Collected: 04/29/25 0758    Order Status: Completed Specimen: Blood from Peripheral, Antecubital, Left Updated: 05/04/25 1801     Blood Culture No Growth After 5 Days    Respiratory Infection Panel (PCR), Nasopharyngeal [[id number]] Collected: 05/02/25 1027    Order Status: Completed Specimen: Nasopharyngeal Swab Updated: 05/02/25 1545     Respiratory Infection Panel Source Nasopharyngeal Swab     Adenovirus Not Detected     Coronavirus 229E, Common Cold Virus Not Detected     Coronavirus HKU1, Common Cold Virus Not Detected     Coronavirus NL63, Common Cold Virus Not Detected     Coronavirus OC43, Common Cold Virus Not Detected     SARS-CoV2 (COVID-19) Qualitative PCR Not Detected     Human Metapneumovirus Not Detected     Human Rhinovirus/Enterovirus Not Detected     Influenza A Not Detected     Influenza B Not Detected     Parainfluenza Virus 1 Not Detected     Parainfluenza Virus 2 Not Detected     Parainfluenza Virus 3 Not Detected     Parainfluenza Virus 4 Not Detected     Respiratory Syncytial Virus Not Detected     Bordetella  Parapertussis ([id number]) Not Detected     Bordetella pertussis (ptxP) Not Detected     Chlamydia pneumoniae Not Detected     Mycoplasma pneumoniae Not Detected          5/1  BC x2 4/29 NGTD. vancomycins discontinued.  respiratory cultures pending.  CT chest ordered -L GGO concerning for pneumonia possible hiatal hernia and possible anterior vertebral osteophyte near mid esophagus.  SLP consulted  and consider esophogram to assess for hiatal hernia and recurrent aspiration as a cause of repeat pneumonia. continue cefepime. completed azithromycin. denies hemoptysis this admission. last episode 2 weeks back. resumed prograf 1mg BID. continue prednisone. hold cellcept for now     Patient has a diagnosis of pneumonia. The cause of the pneumonia is suspected to be bacterial in etiology but organism is not known. The pneumonia is stable. The patient has the following signs/symptoms of pneumonia: cough. The patient does not have a current oxygen requirement and the patient does not have a home oxygen requirement. I have reviewed the pertinent imaging. The following cultures have been collected: Blood cultures and Sputum culture The culture results are listed below.     Current antimicrobial regimen consists of the antibiotics listed below. Will monitor patient closely and continue current treatment plan unchanged.    -Given fevers/chills and recurrent pna, reasonable to continue below abx and deescalate when indicated  -f/u sputum, blood cx    Antibiotics (From admission, onward)      Start     Stop Route Frequency Ordered    04/30/25 0900  azithromycin (ZITHROMAX) 500 mg in 0.9% NaCl 250 mL IVPB (admixture device)         05/01/25 1259 IV Every 24 hours (non-standard times) 04/29/25 1016          Antibiotics (From admission, onward)      Start     Stop Route Frequency Ordered    04/30/25 0900  azithromycin (ZITHROMAX) 500 mg in 0.9% NaCl 250 mL IVPB (admixture device)         05/01/25 1259 IV Every 24 hours (non-standard  times) 04/29/25 1016            Patient has a diagnosis of pneumonia. The cause of the pneumonia is suspected to be bacterial in etiology but organism is not known. The pneumonia is stable. The patient has the following signs/symptoms of pneumonia: cough. The patient does not have a current oxygen requirement and the patient does not have a home oxygen requirement. I have reviewed the pertinent imaging. The following cultures have been collected: Blood cultures and Sputum culture The culture results are listed below.     Current antimicrobial regimen consists of the antibiotics listed below. Will monitor patient closely and continue current treatment plan unchanged.    -Given fevers/chills and recurrent pna, reasonable to continue below abx and deescalate when indicated  -f/u sputum, blood cx    Antibiotics (From admission, onward)      Start     Stop Route Frequency Ordered    04/30/25 0900  azithromycin (ZITHROMAX) 500 mg in 0.9% NaCl 250 mL IVPB (admixture device)         05/01/25 1259 IV Every 24 hours (non-standard times) 04/29/25 1016          Microbiology Results (last 7 days)       Procedure Component Value Units Date/Time    Blood culture x two cultures. Draw prior to antibiotics. [[id number]]  (Normal) Collected: 04/29/25 0758    Order Status: Completed Specimen: Blood from Peripheral, Forearm, Left Updated: 05/04/25 1801     Blood Culture No Growth After 5 Days    Blood culture x two cultures. Draw prior to antibiotics. [[id number]]  (Normal) Collected: 04/29/25 0758    Order Status: Completed Specimen: Blood from Peripheral, Antecubital, Left Updated: 05/04/25 1801     Blood Culture No Growth After 5 Days    Respiratory Infection Panel (PCR), Nasopharyngeal [[id number]] Collected: 05/02/25 1027    Order Status: Completed Specimen: Nasopharyngeal Swab Updated: 05/02/25 1545     Respiratory Infection Panel Source Nasopharyngeal Swab     Adenovirus Not Detected     Coronavirus 229E, Common Cold Virus Not  Detected     Coronavirus HKU1, Common Cold Virus Not Detected     Coronavirus NL63, Common Cold Virus Not Detected     Coronavirus OC43, Common Cold Virus Not Detected     SARS-CoV2 (COVID-19) Qualitative PCR Not Detected     Human Metapneumovirus Not Detected     Human Rhinovirus/Enterovirus Not Detected     Influenza A Not Detected     Influenza B Not Detected     Parainfluenza Virus 1 Not Detected     Parainfluenza Virus 2 Not Detected     Parainfluenza Virus 3 Not Detected     Parainfluenza Virus 4 Not Detected     Respiratory Syncytial Virus Not Detected     Bordetella Parapertussis ([id number]) Not Detected     Bordetella pertussis (ptxP) Not Detected     Chlamydia pneumoniae Not Detected     Mycoplasma pneumoniae Not Detected          5/1  BC x2 4/29 NGTD. vancomycins discontinued.  respiratory cultures pending.  CT chest ordered -RML GGO concerning for pneumonia possible hiatal hernia and possible anterior vertebral osteophyte near mid esophagus.  SLP consulted  and consider esophogram to assess for hiatal hernia and recurrent aspiration as a cause of repeat pneumonia. continue cefepime. completed azithromycin. denies hemoptysis this admission. last episode 2 weeks back. resumed prograf 1mg BID. continue prednisone. hold cellcept for now     Patient has a diagnosis of pneumonia. The cause of the pneumonia is suspected to be bacterial in etiology but organism is not known. The pneumonia is stable. The patient has the following signs/symptoms of pneumonia: cough. The patient does not have a current oxygen requirement and the patient does not have a home oxygen requirement. I have reviewed the pertinent imaging. The following cultures have been collected: Blood cultures and Sputum culture The culture results are listed below.     Current antimicrobial regimen consists of the antibiotics listed below. Will monitor patient closely and continue current treatment plan unchanged.    -Given fevers/chills and recurrent  pna, reasonable to continue below abx and deescalate when indicated  -f/u sputum, blood cx    Antibiotics (From admission, onward)      Start     Stop Route Frequency Ordered    04/30/25 0900  azithromycin (ZITHROMAX) 500 mg in 0.9% NaCl 250 mL IVPB (admixture device)         05/01/25 1259 IV Every 24 hours (non-standard times) 04/29/25 1016          Antibiotics (From admission, onward)      Start     Stop Route Frequency Ordered    04/30/25 0900  azithromycin (ZITHROMAX) 500 mg in 0.9% NaCl 250 mL IVPB (admixture device)         05/01/25 1259 IV Every 24 hours (non-standard times) 04/29/25 1016          5/1  PMH of DM, renal transplant 2016, CHFpEF, AF/AFL (PVI/PWI/CTI 6/2024), CVA, recurrent PNA who presents with fevers, chills.  States that symptoms are similar to last month when he was admitted for pneumonia. No other pna symptoms despite CXR changes.BC x2 4/29 NGTD. vancomycins discontinued.  respiratory cultures pending.  CT chest ordered -L GGO concerning for pneumonia possible hiatal hernia and possible anterior vertebral osteophyte near mid esophagus.  SLP consulted  and  barium esophogram to assess for hiatal hernia (second episode of pneumonia since March 2025) and recurrent aspiration as a cause of repeat pneumonia. continue cefepime. completed azithromycin. denies hemoptysis this admission. last episode 2 weeks back. resumed prograf 1mg BID. continue prednisone. hold cellcept for now     Patient has a diagnosis of pneumonia. The cause of the pneumonia is suspected to be bacterial in etiology but organism is not known. The pneumonia is stable. The patient has the following signs/symptoms of pneumonia: cough. The patient does not have a current oxygen requirement and the patient does not have a home oxygen requirement. I have reviewed the pertinent imaging. The following cultures have been collected: Blood cultures and Sputum culture The culture results are listed below.     Current antimicrobial  regimen consists of the antibiotics listed below. Will monitor patient closely and continue current treatment plan unchanged.    -Given fevers/chills and recurrent pna, reasonable to continue below abx and deescalate when indicated  -f/u sputum, blood cx    Antibiotics (From admission, onward)      Start     Stop Route Frequency Ordered    04/30/25 0900  azithromycin (ZITHROMAX) 500 mg in 0.9% NaCl 250 mL IVPB (admixture device)         05/01/25 1259 IV Every 24 hours (non-standard times) 04/29/25 1016          Microbiology Results (last 7 days)       Procedure Component Value Units Date/Time    Blood culture x two cultures. Draw prior to antibiotics. [[id number]]  (Normal) Collected: 04/29/25 0758    Order Status: Completed Specimen: Blood from Peripheral, Forearm, Left Updated: 05/04/25 1801     Blood Culture No Growth After 5 Days    Blood culture x two cultures. Draw prior to antibiotics. [[id number]]  (Normal) Collected: 04/29/25 0758    Order Status: Completed Specimen: Blood from Peripheral, Antecubital, Left Updated: 05/04/25 1801     Blood Culture No Growth After 5 Days    Respiratory Infection Panel (PCR), Nasopharyngeal [[id number]] Collected: 05/02/25 1027    Order Status: Completed Specimen: Nasopharyngeal Swab Updated: 05/02/25 1545     Respiratory Infection Panel Source Nasopharyngeal Swab     Adenovirus Not Detected     Coronavirus 229E, Common Cold Virus Not Detected     Coronavirus HKU1, Common Cold Virus Not Detected     Coronavirus NL63, Common Cold Virus Not Detected     Coronavirus OC43, Common Cold Virus Not Detected     SARS-CoV2 (COVID-19) Qualitative PCR Not Detected     Human Metapneumovirus Not Detected     Human Rhinovirus/Enterovirus Not Detected     Influenza A Not Detected     Influenza B Not Detected     Parainfluenza Virus 1 Not Detected     Parainfluenza Virus 2 Not Detected     Parainfluenza Virus 3 Not Detected     Parainfluenza Virus 4 Not Detected     Respiratory Syncytial  Virus Not Detected     Bordetella Parapertussis ([id number]) Not Detected     Bordetella pertussis (ptxP) Not Detected     Chlamydia pneumoniae Not Detected     Mycoplasma pneumoniae Not Detected          5/1  BC x2 4/29 NGTD. vancomycins discontinued.  respiratory cultures pending.  CT chest ordered -L GGO concerning for pneumonia possible hiatal hernia and possible anterior vertebral osteophyte near mid esophagus.  SLP consulted  and consider esophogram to assess for hiatal hernia and recurrent aspiration as a cause of repeat pneumonia. continue cefepime. completed azithromycin. denies hemoptysis this admission. last episode 2 weeks back. resumed prograf 1mg BID. continue prednisone. hold cellcept for now     Patient has a diagnosis of pneumonia. The cause of the pneumonia is suspected to be bacterial in etiology but organism is not known. The pneumonia is stable. The patient has the following signs/symptoms of pneumonia: cough. The patient does not have a current oxygen requirement and the patient does not have a home oxygen requirement. I have reviewed the pertinent imaging. The following cultures have been collected: Blood cultures and Sputum culture The culture results are listed below.     Current antimicrobial regimen consists of the antibiotics listed below. Will monitor patient closely and continue current treatment plan unchanged.    -Given fevers/chills and recurrent pna, reasonable to continue below abx and deescalate when indicated  -f/u sputum, blood cx    Antibiotics (From admission, onward)      Start     Stop Route Frequency Ordered    04/30/25 0900  azithromycin (ZITHROMAX) 500 mg in 0.9% NaCl 250 mL IVPB (admixture device)         05/01/25 1259 IV Every 24 hours (non-standard times) 04/29/25 1016          Antibiotics (From admission, onward)      Start     Stop Route Frequency Ordered    04/30/25 0900  azithromycin (ZITHROMAX) 500 mg in 0.9% NaCl 250 mL IVPB (admixture device)         05/01/25 1259  IV Every 24 hours (non-standard times) 04/29/25 1016

## 2025-05-07 NOTE — ASSESSMENT & PLAN NOTE
as above     Antibiotics (From admission, onward)      Start     Stop Route Frequency Ordered    04/30/25 0900  azithromycin (ZITHROMAX) 500 mg in 0.9% NaCl 250 mL IVPB (admixture device)         05/01/25 1259 IV Every 24 hours (non-standard times) 04/29/25 1016            Microbiology Results (last 7 days)       Procedure Component Value Units Date/Time    Blood culture x two cultures. Draw prior to antibiotics. [[id number]]  (Normal) Collected: 04/29/25 0758    Order Status: Completed Specimen: Blood from Peripheral, Forearm, Left Updated: 05/04/25 1801     Blood Culture No Growth After 5 Days    Blood culture x two cultures. Draw prior to antibiotics. [[id number]]  (Normal) Collected: 04/29/25 0758    Order Status: Completed Specimen: Blood from Peripheral, Antecubital, Left Updated: 05/04/25 1801     Blood Culture No Growth After 5 Days    Respiratory Infection Panel (PCR), Nasopharyngeal [[id number]] Collected: 05/02/25 1027    Order Status: Completed Specimen: Nasopharyngeal Swab Updated: 05/02/25 1545     Respiratory Infection Panel Source Nasopharyngeal Swab     Adenovirus Not Detected     Coronavirus 229E, Common Cold Virus Not Detected     Coronavirus HKU1, Common Cold Virus Not Detected     Coronavirus NL63, Common Cold Virus Not Detected     Coronavirus OC43, Common Cold Virus Not Detected     SARS-CoV2 (COVID-19) Qualitative PCR Not Detected     Human Metapneumovirus Not Detected     Human Rhinovirus/Enterovirus Not Detected     Influenza A Not Detected     Influenza B Not Detected     Parainfluenza Virus 1 Not Detected     Parainfluenza Virus 2 Not Detected     Parainfluenza Virus 3 Not Detected     Parainfluenza Virus 4 Not Detected     Respiratory Syncytial Virus Not Detected     Bordetella Parapertussis ([id number]) Not Detected     Bordetella pertussis (ptxP) Not Detected     Chlamydia pneumoniae Not Detected     Mycoplasma pneumoniae Not Detected

## 2025-05-07 NOTE — ASSESSMENT & PLAN NOTE
Patient has a current diagnosis of hypertensive urgency (without evidence of end organ damage) which is uncontrolled.  Latest blood pressure and vitals reviewed-   Temp:  [97.8 °F (36.6 °C)-98.5 °F (36.9 °C)]   Pulse:  []   Resp:  [18-20]   BP: (127-163)/(69-87)   SpO2:  [98 %-100 %] .   Patient currently on IV antihypertensives.   Home meds for hypertension were reviewed and noted below.   Hypertension Medications              doxazosin (CARDURA) 4 MG tablet Take 1 tablet (4 mg total) by mouth once daily.    furosemide (LASIX) 40 MG tablet Take 2 tablets (80 mg total) by mouth daily as needed (for swelling).    hydrALAZINE (APRESOLINE) 100 MG tablet Take 1 tablet (100 mg total) by mouth every 8 (eight) hours.    hydroCHLOROthiazide 12.5 MG Tab Take 1 tablet (12.5 mg total) by mouth once daily.    valsartan (DIOVAN) 320 MG tablet Take 1 tablet (320 mg total) by mouth once daily.            5/3 SBP 200s overnight. started on IV hydralzine. received IV hydralazine 15mg x 2 doses. /84.   5/4 SBP 200s overnight. received hydralazine 15mg x 1 overnight. discussed with KTM. Cr stable. resume valsartan. Clonidine 0.1 mg PRN for SBP > 170mm HG   5/5 SBP 180s overnight. SBP 180s overnight. started on clonidine patch and  eplerenone 50mg daily.   5/6 SBP 160s. monitor.

## 2025-05-07 NOTE — ASSESSMENT & PLAN NOTE
Patient has persistent (7 days or more) atrial fibrillation. Patient is currently in atrial fibrillation. [id number] Score: 3. The patients heart rate in the last 24 hours is as follows:  Pulse  Min: 75  Max: 117     Antiarrhythmics       Anticoagulants  rivaroxaban tablet 15 mg, With dinner, Oral    Plan  - Replete lytes with a goal of K>4, Mg >2  - Patient is anticoagulated, see medications listed above.  - Patient's afib is currently controlled

## 2025-05-07 NOTE — PROGRESS NOTES
Name: Ravi Zamorano  Medical Record Number: [id number]  Date of Service: 05/07/2025  Note By: Honey Marley MD    RENAL TRANSPLANT PROGRESS NOTE    ORGAN: RIGHT KIDNEY  Donor Type: donation after brain death  PHS Increased Risk: yes  Cold Ischemia: 314 mins  Induction Medications: thymoglobulin    Reason for Follow-up: Reassessment of kidney transplant recipient and management of immunosuppression.    Summary: Mr. Zamorano is a 69 y.o. male with history of ESRD presumably 2/2 HTN/T2DM s/p DBDKT (11/5/2016) on tacrolimus/cellcept/pred, CKD4 post-transplant, HTN, CAD, who was admitted on 4/29/2025 for pneumonia.    Interval History: Patient reports feeling well, has no new complaints today. States that he drinks about 1.5L of fluids daily, though he isn't sure about how much he's been urinating lately, but feels like it hasn't decreased. Denies lightheadedness, CP, SOB. Reports improvement in BLE edema.     PMHx:  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    CKD (chronic kidney disease) stage 4, GFR 15-29 ml/min 07/29/2014    Colon polyps 2014    Depression - situational 07/29/2014    Diabetes type 2 since 2000 07/29/2014    Diabetic neuropathy 07/29/2014    Encounter for blood transfusion     History of cardioversion 10/03/2019    History of hepatitis C, s/p successful Rx w/ SVR24 - 9/2017 07/29/2014    Genotype 1a, treatment naive 10/2014 liver biopsy - grade 1 / stage 1 Completed Harvoni w/ SVR    Hyperlipidemia 07/29/2014    Hypertension     Neuropathy     Organ transplant candidate 07/29/2014    Pancreatitis     S/P cadaveric kidney transplant 11/5/2016. ESRD secondary to HTN/DMII 11/05/2016    Type 2 diabetes mellitus with stage 3a chronic kidney disease, with long-term current use of insulin 07/29/2014     Medications:   Scheduled Meds:   acetaminophen   1,000 mg Oral Q12H    atorvastatin  80 mg Oral Daily    cloNIDine 0.1 mg/24 hr td ptwk  1 patch Transdermal Q7 Days    doxazosin  8 mg Oral Daily    empagliflozin  10 mg Oral Daily    eplerenone  50 mg Oral Daily    gabapentin  300 mg Oral Daily    gabapentin  300 mg Oral After lunch    gabapentin  300 mg Oral QHS    hydrALAZINE  100 mg Oral Q8H    Lactobacillus rhamnosus GG  1 capsule Oral Daily    polyethylene glycol  17 g Oral Daily    potassium chloride  20 mEq Oral Once    predniSONE  5 mg Oral Daily    rivaroxaban  15 mg Oral Daily with dinner    tacrolimus  1 mg Oral BID    torsemide  40 mg Oral Daily     Continuous Infusions:   insulin aspart U-100  0.6 Units/hr Subcutaneous Continuous 0.01 mL/hr at 05/06/25 1415 0.6 Units/hr at 05/06/25 1415       PRN Meds:.  Current Facility-Administered Medications:     albuterol-ipratropium, 3 mL, Nebulization, Q6H PRN    dextrose 50%, 12.5 g, Intravenous, PRN    dextrose 50%, 12.5 g, Intravenous, PRN    dextrose 50%, 25 g, Intravenous, PRN    dextrose 50%, 25 g, Intravenous, PRN    glucagon (human recombinant), 1 mg, Intramuscular, PRN    glucagon (human recombinant), 1 mg, Intramuscular, PRN    glucose, 16 g, Oral, PRN    glucose, 16 g, Oral, PRN    glucose, 24 g, Oral, PRN    glucose, 24 g, Oral, PRN    hydrALAZINE, 15 mg, Intravenous, Q6H PRN    insulin aspart U-100, 0-10 Units, Subcutaneous, PRN    meclizine, 25 mg, Oral, TID PRN    melatonin, 6 mg, Oral, Nightly PRN    naloxone, 0.02 mg, Intravenous, PRN    ondansetron, 8 mg, Oral, Q8H PRN    prochlorperazine, 5 mg, Intravenous, Q6H PRN    sodium chloride 0.9%, 10 mL, Intravenous, Q8H PRN    Review of Systems:   Reviewed. Pertinent positives and negatives included above.    Physical Exam:  Vitals:  Vitals:    05/07/25 0549   BP: (!) 163/87   Pulse: 80   Resp: 20   Temp: 97.8 °F (36.6 °C)     Temp:  [97.5 °F (36.4 °C)-98.4 °F (36.9 °C)] 97.8 °F (36.6 °C)  Pulse:  [] 80  Resp:  [16-20] 20  SpO2:  [98 %-100 %]  98 %  BP: (127-163)/(69-87) 163/87    Exam  General: alert, conversational, NAD, sitting up on side of bed  HEENT: moist mucus membranes  Respiratory: CTAB, breathing comfortably on RA  Cardiovacular: RRR, no murmur appreciated  Gastrointestinal: soft, nontender  Renal allograft exam: No tenderness, no bruits   Extremities: trace edema in BLE    Labs:  Lab Results   Component Value Date    WBC 5.96 05/07/2025    HGB 9.1 (L) 05/07/2025    HCT 31.1 (L) 05/07/2025     05/07/2025    K 3.5 05/07/2025     05/07/2025    CO2 23 05/07/2025    BUN 49 (H) 05/07/2025    CREATININE 3.4 (H) 05/07/2025    EGFRNONAA 44.9 (A) 07/19/2022    CALCIUM 8.7 05/07/2025    PHOS 4.2 05/07/2025    MG 1.6 05/07/2025    ALBUMIN 2.6 (L) 05/03/2025    AST 14 04/29/2025    ALT <5 (L) 04/29/2025     Imaging Studies:  CXR 4/29/25:  Heart is enlarged, but allowing for magnification of the cardiomediastinal silhouette related both to projection and to a poorer inspiratory depth level than on the prior exam, I doubt that the heart has changed appreciably in size since that time. However, there has been a detrimental interval change in the appearance of the chest, as the current examination demonstrates pulmonary parenchymal opacity in the right lower lung zone consistent with the development of airspace consolidation since the prior exam. Given the patient's clinical history, this may represent acute pneumonia. The left lung and the remainder of the right lung are clear. No pleural fluid of any substantial volume is seen on either side. No pneumothorax. ?    Assessment/Plan:  69 y.o. male with ESRD presumably 2/2 HTN/T2DM s/p DDRT (11/5/2016) on tacrolimus//cellcept/pred, who is admitted for RLL pneumonia.     ESRD 2/2 HTN/T2DM s/p cadaveric kidney transplant (11/5/16)  PHUONG on CKD4  - baseline Cr ~2's with eGFR ~20s  - kidney function has been slowly worsening over the last couple of years  - follow strict I/Os, daily weights  - avoid  nephrotoxic agents like NSAIDs, IV contrast, aminoglycoside-containing Abx  - renally dose medications  - Cr continues to trend up since 5/2, up to 3.4 today, suspect 2/2 hemodynamic instability (adjustment of antihypertensives) and some volume depletion (torsemide)  - recommend 500cc bolus of LR to offset diuresis today  - recommend changing torsemide to HCTZ 25mg  - recommend restarting valsartan    Immunosuppression Management  - tac level 5.5 this morning  - continue Prograf 1mg BID; goal trough level is 5-7ng/ml  - holding cellcept in setting of PNA  - continue prednisone 5mg daily    Chronic anemia  - Hb currently ~8-9   - Tsat 30% (02/2025)  - last known ferritin in system 800s in 04/2024  - patient receives EPO injections outpatient  - continue to monitor    HTN  - BP has been above goal, but antihypertensive regimen is difficult given his difficulties with some antihypertensives previously  - gingival hyperplasia with nifedipine, bradycardia (down to 20s) with coreg  - continue clonidine 0.1mg patch, doxazosin 8mg, eplerenone 50mg, hydralazine 100mg TID  - recommend changing torsemide to HCTZ 25mg  - recommend restarting Valsartan 320mg      We will continue to follow.       Honey Marley MD  [facility] Nephrology Fellow, PGY-4  Kidney Transplant Medicine

## 2025-05-07 NOTE — ASSESSMENT & PLAN NOTE
Patient's FSGs are controlled on current medication regimen.  Last A1c reviewed-   Lab Results   Component Value Date    HGBA1C 8.3 (H) 03/27/2025     Most recent fingerstick glucose reviewed-   Recent Labs   Lab 05/06/25  2148 05/07/25  0800 05/07/25  1222 05/07/25  1543   POCTGLUCOSE 115* 84 85 192*     Current correctional scale  Medium  Maintain anti-hyperglycemic dose as follows-   Antihyperglycemics (From admission, onward)      Start     Stop Route Frequency Ordered    05/06/25 1415  insulin aspart U-100 insulin pump from home        Question Answer Comment   Target number 110    Basal Rate #1 0.6    Basal rate #1 time 5085-9956        -- SubQ Continuous 05/06/25 1303    05/06/25 1402  insulin aspart U-100 insulin pump from home 0-10 Units        Question Answer Comment   Target number 110    Carbohydrate coverage #1 7.5    Carbohydrate coverage #1 time 4886-1874    Sensitivity #1 40    Sensitivity #1 time 1625-8131        -- SubQ As needed (PRN) 05/06/25 1303    04/29/25 1115  empagliflozin (Jardiance) tablet 10 mg        Question Answer Comment   Does this patient have a diagnosis of heart failure? Yes    Does this patient have type 1 diabetes or diabetic ketoacidosis? No    Does this patient have symptomatic hypotension? No    Is the patient NPO or pending major surgery in next 3 days or less? No        -- Oral Daily 04/29/25 1016          Hold Oral hypoglycemics while patient is in the hospital.    Blood glucose acceptable

## 2025-05-07 NOTE — PROGRESS NOTES
[facility]  Progress Note    Patient Name: Ravi Zamorano  MRN: [id number]  Patient Class: IP- Inpatient   Admission Date: 4/29/2025  Length of Stay: 8 days  Attending Physician: Cliff Guaman MD  Primary Care Provider: Mima Mack MD        Subjective     Principal Problem:Community acquired bacterial pneumonia        HPI:  This is a 69-year-old male with a history of DM, renal transplant 2016, CHFpEF, AF/AFL (PVI/PWI/CTI 6/2024), CVA, recurrent PNA who presents with fevers, chills.  States that symptoms are similar to last month when he was admitted for pneumonia.  Was in his usual state of health until last night when he started experiencing intermittent fevers, chills.  Wife states she also noticed that he has had increased intermittent cough over the past day or so.  Patient did not notice this.  Denies any production.  Also denies any shortness for breath, wheezing, chest pain, abdominal pain, diarrhea, nausea, vomiting, dysuria.  Compliant with all medications.  Trace lower extremity swelling is chronic.    ED course:  On room air.  Lab work significant for Cr 2.7, same as previous. CXR showed development of RLL consolidation.  Received IV vancomycin, azithromycin, Zosyn.  Septic workup obtained.    Overview/Hospital Course:  Patient admitted due to fever, fatigue and chills at home. He has not had any cough or sob at home. He was recently treated for PNA at the end of march and was feeling a lot better since then, until he had a sudden onset of symptoms. He did describe some productive cough for several morning and the mucus he coughed op had rust colored blood as well as bright red blood sometimes. Theses episodes only happened shortly after wakening in the morning and didn't persist during the day, no associated sob or other symptoms during those times. Denies nasal drainage, congestion, or nose bleeds.       Started on broad spectrum and feeling a lot better.  Still on RA and no cough, wheezes or sob     CT chest ordered to get a better look at the consolidations and possible hemoptysis, will considered pulmonology and/or ID consulted to help direct care    5/1  PMH of DM, renal transplant 2016, CHFpEF, AF/AFL (PVI/PWI/CTI 6/2024), CVA, recurrent PNA who presents with fevers, chills.  States that symptoms are similar to last month when he was admitted for pneumonia. No other pna symptoms despite CXR changes.BC x2 4/29 NGTD. vancomycins discontinued.  respiratory cultures pending.  CT chest ordered -L GGO concerning for pneumonia possible hiatal hernia and possible anterior vertebral osteophyte near mid esophagus.  SLP consulted. MBSS and barium esophogram to assess for hiatal hernia (second episode of pneumonia since March 2025) and recurrent aspiration as a cause of repeat pneumonia. continue cefepime. completed azithromycin. denies hemoptysis this admission. last episode 2 weeks back. resumed prograf 1mg BID. continue prednisone. hold cellcept for now   5/2 s/p FEES. SLP recs regular diet/thin liquids CT chest -Bilateral multilobar consolidations and ground-glass opacities associated with bronchial thickening suggestive of infectious/inflammatory process. Right moderate and left small pleural effusions.  ID consulted for recurrent pneumonia/ immunosuppressed/ hemoptysis.  SBP 160s-180s. coreg previously due to bradycardia during hospitalization 2/202. discontinued Spironolactone 25 mg daily -  3/2024 due to getting LH and dizzy . procardia listed as allergy. Doxazosin increased to 8mg daiy. . HCTZ discontinued. started on torsemide 40mg daily. ID eval  -  resp infection panel and urine legionella  MRSA nares, resp culture  5/3 SBP 200s overnight. started on IV hydralzine. received IV hydralazine 15mg x 2 doses. BP  132/67 . K replaced. RIP negative. EKG -Atrial fibrillation with premature ventricular or aberrantly conducted complexe. Low voltage QRS. KTM f/u - Cr  elevated from his baseline - suspicion for failing allograft and approaching need for dialysis .  hold valsartan for now. monitor BP  5/4 K replaced. SBP 200s overnight. received hydralazine 15mg x 1 overnight. discussed with KTM. Cr stable. resume valsartan. Clonidine 0.1 mg PRN for SBP > 170mm HG. continue  cefepime for 7 day course (end date 5/5/25)  5/5 complete cefepime today. CR stable. SBP 180s overnight. started on clonidine patch and  eplerenone 50mg daily.   per radiology, small hiatal hernia visible on their CT chest 5/1/25. reports left LE pain with ambulation. WENDI ordered   5/6 SBP 160s. K replaced.  monitor. WENDI -Right Leg: Segmental pressures and PVR waveforms suggest moderate peripheral arterial occlusive disease. Rt Great Toe: The PPG waveform as described above. - TBI of 0.55 with a great toe pressure of 95 mmHg is noted. Left Leg: Segmental pressures and PVR waveforms suggest moderate peripheral arterial occlusive disease. Lt Great Toe: The PPG waveform as described above. - TBI of 0.59 with a great toe pressure of 101 mmHg is noted.  Endocrine consulted for insulin pump. Hold Cellcept - resume after one month. monitor blood pressure  5/7 Cr trended up to 3.4. Valsartan held. Sono transplant kidney-Mildly elevated intraparenchymal resistive indices which may be seen with medical renal disease, rejection, or drug toxicity. . BP improved. KTM f/u  -recs 500cc bolus of LR, changing his torsemide back to HCTZ 25mg daily, and restarting his valsartan. monitor renal function in AM       Review of Systems:   Pain scale:   Constitutional:  fever,  chills, headache, vision loss, hearing loss, weight loss, Generalized weakness, falls, loss of smell, loss of taste, poor appetite,  sore throat  Respiratory: cough, shortness of breath.   Cardiovascular: chest pain, dizziness, palpitations, orthopnea, swelling of feet, syncope  Gastrointestinal: nausea, vomiting, abdominal pain, diarrhea, black stool,  blood in  stool, change in bowel habits, constipation  Genitourinary: hematuria, dysuria, urgency, frequency  Integument/Breast: rash,  pruritis  Hematologic/Lymphatic: easy bruising, lymphadenopathy  Musculoskeletal: arthralgias , myalgias, back pain, neck pain, knee pain  Neurological: confusion, seizures, tremors, slurred speech  Behavioral/Psych:  depression, anxiety, auditory or visual hallucinations     OBJECTIVE:     Physical Exam:  Body mass index is 26.39 kg/m².    Constitutional: Appears well-developed and well-nourished.   Head: Normocephalic and atraumatic.   Neck: Normal range of motion. Neck supple.   Cardiovascular: Normal heart rate.  Regular heart rhythm.  Pulmonary/Chest: Effort normal.   Abdominal: No distension.  No tenderness  Musculoskeletal: Normal range of motion. No edema.   Neurological: Alert and oriented to person, place, and time.   Skin: Skin is warm and dry.   Psychiatric: Normal mood and affect. Behavior is normal.                  Vital Signs  Temp: 98.2 °F (36.8 °C) (05/07/25 1543)  Pulse: 104 (05/07/25 1543)  Resp: 18 (05/07/25 1543)  BP: 133/72 (05/07/25 1543)  SpO2: 99 % (05/07/25 1543)     24 Hour VS Range    Temp:  [97.8 °F (36.6 °C)-98.5 °F (36.9 °C)]   Pulse:  []   Resp:  [18-20]   BP: (127-163)/(69-87)   SpO2:  [98 %-100 %]     Intake/Output Summary (Last 24 hours) at 5/7/2025 1820  Last data filed at 5/7/2025 0603  Gross per 24 hour   Intake 720 ml   Output --   Net 720 ml           I/O This Shift:  No intake/output data recorded.    Wt Readings from Last 3 Encounters:   04/29/25 90.7 kg (200 lb)   04/22/25 92.8 kg (204 lb 9.4 oz)   04/16/25 91.9 kg (202 lb 9.6 oz)       I have personally reviewed the vitals and recorded Intake/Output     Laboratory/Diagnostic Data:    CBC/Anemia Labs: Coags:    Recent Labs   Lab 05/05/25  0513 05/06/25  0644 05/07/25  0328   WBC 5.05 4.95 5.96   HGB 9.0* 8.6* 9.1*   HCT 31.5* 29.6* 31.1*   * 137* 149*   MCV 78* 77* 78*   RDW 19.7* 19.6*  "19.4*    No results for input(s): "PT", "INR", "APTT" in the last 168 hours.     Chemistries: ABG:   Recent Labs   Lab 05/05/25  0513 05/06/25  0644 05/07/25  0328    138 137   K 3.9 3.4* 3.5    107 105   CO2 22* 22* 23   BUN 46* 45* 49*   CREATININE 2.8* 2.9* 3.4*   CALCIUM 8.7 8.7 8.7   MG 1.9 1.8 1.6   PHOS 4.1 4.3 4.2    No results for input(s): "PH", "PCO2", "PO2", "HCO3", "POCSATURATED", "BE" in the last 168 hours.       POCT Glucose: HbA1c:    Recent Labs   Lab 05/06/25  1149 05/06/25  1623 05/06/25  2148 05/07/25  0800 05/07/25  1222 05/07/25  1543   POCTGLUCOSE 172* 135* 115* 84 85 192*    Hemoglobin A1C   Date Value Ref Range Status   01/27/2025 8.6 (H) 4.0 - 5.6 % Final     Comment:     ADA Screening Guidelines:  5.7-6.4%  Consistent with prediabetes  >or=6.5%  Consistent with diabetes    High levels of fetal hemoglobin interfere with the HbA1C  assay. Heterozygous hemoglobin variants (HbS, HgC, etc)do  not significantly interfere with this assay.   However, presence of multiple variants may affect accuracy.     12/07/2024 8.1 (H) 4.0 - 5.6 % Final     Comment:     ADA Screening Guidelines:  5.7-6.4%  Consistent with prediabetes  >or=6.5%  Consistent with diabetes    High levels of fetal hemoglobin interfere with the HbA1C  assay. Heterozygous hemoglobin variants (HbS, HgC, etc)do  not significantly interfere with this assay.   However, presence of multiple variants may affect accuracy.     11/19/2024 7.4 (H) 4.0 - 5.6 % Final     Comment:     ADA Screening Guidelines:  5.7-6.4%  Consistent with prediabetes  >or=6.5%  Consistent with diabetes    High levels of fetal hemoglobin interfere with the HbA1C  assay. Heterozygous hemoglobin variants (HbS, HgC, etc)do  not significantly interfere with this assay.   However, presence of multiple variants may affect accuracy.       Hemoglobin A1c   Date Value Ref Range Status   03/27/2025 8.3 (H) 4.0 - 5.6 % Final     Comment:     ADA Screening " "Guidelines:  5.7-6.4%  Consistent with prediabetes  >=6.5%  Consistent with diabetes    High levels of fetal hemoglobin interfere with the HbA1C  assay. Heterozygous hemoglobin variants (HbS, HgC, etc)do  not significantly interfere with this assay.   However, presence of multiple variants may affect accuracy.        Cardiac Enzymes: Ejection Fractions:    No results for input(s): "CPK", "CPKMB", "MB", "TROPONINI" in the last 72 hours. EF   Date Value Ref Range Status   02/21/2025 53 % Final   06/16/2024 58 % Final          No results for input(s): "COLORU", "APPEARANCEUA", "PHUR", "SPECGRAV", "PROTEINUA", "GLUCUA", "KETONESU", "BILIRUBINUA", "OCCULTUA", "NITRITE", "UROBILINOGEN", "LEUKOCYTESUR", "RBCUA", "WBCUA", "BACTERIA", "SQUAMEPITHEL", "HYALINECASTS" in the last 48 hours.    Invalid input(s): "WRIGHTSUR"    Lactate (Lactic Acid) (mmol/L)   Date Value   07/11/2022 1.0   07/10/2022 3.0 (H)   07/10/2022 3.1 (H)   03/15/2019 1.5   03/15/2019 3.3 (H)     BNP (pg/mL)   Date Value   03/16/2025 1,766 (H)   02/20/2025 1,055 (H)   06/25/2024 992 (H)   06/15/2024 3,203 (H)   06/04/2024 3,313 (H)     CRP (mg/L)   Date Value   04/02/2024 6.9   07/10/2022 40.5 (H)     Sed Rate   Date Value   04/02/2024 62 mm/Hr (H)   03/09/2007 2 mm/hr   05/10/2006 6 mm/hr     D-Dimer (mg/L FEU)   Date Value   07/10/2022 0.45     Ferritin (ng/mL)   Date Value   04/01/2024 816 (H)   07/10/2022 148   02/07/2022 73   05/26/2021 29   01/30/2017 251   11/08/2016 389 (H)     LD (U/L)   Date Value   04/01/2024 206   07/10/2022 221   11/10/2021 220     Troponin I (ng/mL)   Date Value   06/15/2024 0.812 (H)   06/15/2024 0.948 (H)   06/15/2024 0.934 (H)   05/02/2024 0.036 (H)   05/02/2024 0.049 (H)   07/11/2022 0.090 (H)   07/10/2022 0.265 (H)   03/16/2019 0.037 (H)     CPK (U/L)   Date Value   07/10/2022 83   03/16/2019 56   05/09/2008 86   05/15/2006 87   05/10/2006 44   05/10/2006 29   05/09/2006 40     Results for orders placed or performed in " visit on 07/11/24   Vitamin D    Collection Time: 07/11/24  4:44 PM   Result Value Ref Range    Vit D, 25-Hydroxy 17 (L) 30 - 96 ng/mL     *Note: Due to a large number of results and/or encounters for the requested time period, some results have not been displayed. A complete set of results can be found in Results Review.     SARS-CoV2 (COVID-19) Qualitative PCR (no units)   Date Value   05/02/2025 Not Detected   03/16/2025 Not Detected   12/18/2020 Not Detected     SARS-CoV-2 RNA, Amplification, Qual (no units)   Date Value   03/16/2025 Negative   12/07/2024 Negative     POC Rapid COVID (no units)   Date Value   07/08/2022 Positive (A)   08/02/2021 Positive (A)       Microbiology labs for the last week  Microbiology Results (last 7 days)       Procedure Component Value Units Date/Time    Blood culture x two cultures. Draw prior to antibiotics. [[id number]]  (Normal) Collected: 04/29/25 0758    Order Status: Completed Specimen: Blood from Peripheral, Forearm, Left Updated: 05/04/25 1801     Blood Culture No Growth After 5 Days    Blood culture x two cultures. Draw prior to antibiotics. [[id number]]  (Normal) Collected: 04/29/25 0758    Order Status: Completed Specimen: Blood from Peripheral, Antecubital, Left Updated: 05/04/25 1801     Blood Culture No Growth After 5 Days    Respiratory Infection Panel (PCR), Nasopharyngeal [[id number]] Collected: 05/02/25 1027    Order Status: Completed Specimen: Nasopharyngeal Swab Updated: 05/02/25 1545     Respiratory Infection Panel Source Nasopharyngeal Swab     Adenovirus Not Detected     Coronavirus 229E, Common Cold Virus Not Detected     Coronavirus HKU1, Common Cold Virus Not Detected     Coronavirus NL63, Common Cold Virus Not Detected     Coronavirus OC43, Common Cold Virus Not Detected     SARS-CoV2 (COVID-19) Qualitative PCR Not Detected     Human Metapneumovirus Not Detected     Human Rhinovirus/Enterovirus Not Detected     Influenza A Not Detected     Influenza B  Not Detected     Parainfluenza Virus 1 Not Detected     Parainfluenza Virus 2 Not Detected     Parainfluenza Virus 3 Not Detected     Parainfluenza Virus 4 Not Detected     Respiratory Syncytial Virus Not Detected     Bordetella Parapertussis (WQ0910) Not Detected     Bordetella pertussis (ptxP) Not Detected     Chlamydia pneumoniae Not Detected     Mycoplasma pneumoniae Not Detected            Reviewed and noted in plan where applicable- Please see chart for full lab data.    Lines/Drains:       Peripheral IV - Single Lumen 04/30/25 1112 20 G Anterior;Distal;Right Upper Arm (Active)   Site Assessment Clean;Dry;Intact;No redness;No swelling;No drainage 05/01/25 0800   Line Status Saline locked;Flushed 05/01/25 0800   Dressing Status Intact;Dry;Clean 05/01/25 0800   Dressing Intervention Integrity maintained 05/01/25 0800   Number of days: 1       Imaging  ECG Results              EKG 12-lead (Final result)        Collection Time Result Time QRS Duration OHS QTC Calculation    04/29/25 07:35:29 04/29/25 07:45:23 112 460                     Final result by Interface, Lab In [facility] (04/29/25 07:45:29)                   Narrative:    Test Reason : Z13.6,    Vent. Rate :  78 BPM     Atrial Rate : 300 BPM     P-R Int :    ms          QRS Dur : 112 ms      QT Int : 404 ms       P-R-T Axes : -89 105   8 degrees    QTcB Int : 460 ms    Atrial flutter  Rightward axis  Nonspecific ST and T wave abnormality  Low septal forces ; Abnormal R wave progression in the precordial leads  -Possible anterior infarct  Possible lateral infarct vs lead reversal  Prolonged QT  Abnormal ECG  When compared with ECG of 16-Apr-2025 13:38,  The axis Shifted right  ST no longer elevated in Anterior leads  Confirmed by Tejas Carter ([id number]) on 4/29/2025 7:45:21 AM    Referred By: AAAREFERRAL SELF           Confirmed By: Tejas Carter                                    Results for orders placed during the hospital encounter of  02/20/25    Echo    Interpretation Summary    Left Ventricle: There is mild concentric hypertrophy. There is low normal systolic function with a visually estimated ejection fraction of 50 - 55%. Grade III diastolic dysfunction.    Left Ventricle: The left ventricle is at the upper limits of normal in size. Mildly increased wall thickness. There is mild concentric hypertrophy. Regional wall motion abnormalities present. See diagram for wall motion findings. There is low normal systolic function with a visually estimated ejection fraction of 50 - 55%. Ejection fraction is approximately 53%. Grade III diastolic dysfunction.    Right Ventricle: Systolic function is borderline low despite the low TAPSE.    Left Atrium: Left atrium is moderately dilated.    Aortic Valve: The aortic valve is a trileaflet valve. There is mild aortic valve sclerosis. There is moderate annular calcification present. There is mild aortic regurgitation with a centrally directed jet.    Mitral Valve: There is mild regurgitation with a centrally directed jet.    Pericardium: There is a trivial posterior effusion and under the RA.    Tricuspid Valve: There is mild regurgitation.    Pulmonary Artery: The estimated pulmonary artery systolic pressure is 30 mmHg.      US Transplant Kidney With Doppler  Narrative: EXAMINATION:  US TRANSPLANT KIDNEY WITH DOPPLER    CLINICAL HISTORY:  Patient with kidney transplant presenting with PHUONG;    TECHNIQUE:  Real time gray scale and doppler ultrasound was performed over the patient's renal allograft.    COMPARISON:  Multiple prior exams, most recent from 12/04/2024.    FINDINGS:  Renal allograft in the right lower quadrant.  The allograft measures 13.3 cm. Normal perfusion. No hydronephrosis.    No fluid collections.    Vasculature:    Resistive indices ranged from 0.72 to 0.84.    Main renal artery peak systolic velocity: 188cm/sec with normal waveform.    Renal artery/iliac ratio: 1.1.    The main renal vein  is patent.  Impression: Mildly elevated intraparenchymal resistive indices which may be seen with medical renal disease, rejection, or drug toxicity.    Electronically signed by: Mateo Fields MD  Date:    05/07/2025  Time:    11:40      Labs, Imaging, EKG and Diagnostic results from 5/7/2025 were reviewed.    Medications:  Medication list was reviewed and changes noted under Assessment/Plan.  Medications Ordered Prior to Encounter[1]  Scheduled Medications:  Current Facility-Administered Medications   Medication Dose Route Frequency    acetaminophen  1,000 mg Oral Q12H    atorvastatin  80 mg Oral Daily    cloNIDine 0.1 mg/24 hr td ptwk  1 patch Transdermal Q7 Days    doxazosin  8 mg Oral Daily    empagliflozin  10 mg Oral Daily    eplerenone  50 mg Oral Daily    gabapentin  300 mg Oral Daily    gabapentin  300 mg Oral After lunch    gabapentin  300 mg Oral QHS    hydrALAZINE  100 mg Oral Q8H    [START ON 5/8/2025] hydroCHLOROthiazide  25 mg Oral Daily    Lactobacillus rhamnosus GG  1 capsule Oral Daily    polyethylene glycol  17 g Oral Daily    predniSONE  5 mg Oral Daily    rivaroxaban  15 mg Oral Daily with dinner    tacrolimus  1 mg Oral BID    valsartan  320 mg Oral Daily     PRN:   Current Facility-Administered Medications:     albuterol-ipratropium, 3 mL, Nebulization, Q6H PRN    dextrose 50%, 12.5 g, Intravenous, PRN    dextrose 50%, 12.5 g, Intravenous, PRN    dextrose 50%, 25 g, Intravenous, PRN    dextrose 50%, 25 g, Intravenous, PRN    glucagon (human recombinant), 1 mg, Intramuscular, PRN    glucagon (human recombinant), 1 mg, Intramuscular, PRN    glucose, 16 g, Oral, PRN    glucose, 16 g, Oral, PRN    glucose, 24 g, Oral, PRN    glucose, 24 g, Oral, PRN    hydrALAZINE, 15 mg, Intravenous, Q6H PRN    insulin aspart U-100, 0-10 Units, Subcutaneous, PRN    meclizine, 25 mg, Oral, TID PRN    melatonin, 6 mg, Oral, Nightly PRN    naloxone, 0.02 mg, Intravenous, PRN    ondansetron, 8 mg, Oral, Q8H PRN     prochlorperazine, 5 mg, Intravenous, Q6H PRN    sodium chloride 0.9%, 10 mL, Intravenous, Q8H PRN  Infusions:    insulin aspart U-100  0.6 Units/hr Subcutaneous Continuous 0.01 mL/hr at 05/06/25 1415 0.6 Units/hr at 05/06/25 1415     Estimated Creatinine Clearance: 23.8 mL/min (A) (based on SCr of 3.4 mg/dL (H)).             Assessment & Plan  Community acquired bacterial pneumonia  Hemoptysis    Patient has a diagnosis of pneumonia. The cause of the pneumonia is suspected to be bacterial in etiology but organism is not known. The pneumonia is stable. The patient has the following signs/symptoms of pneumonia: cough. The patient does not have a current oxygen requirement and the patient does not have a home oxygen requirement. I have reviewed the pertinent imaging. The following cultures have been collected: Blood cultures and Sputum culture The culture results are listed below.     Current antimicrobial regimen consists of the antibiotics listed below. Will monitor patient closely and continue current treatment plan unchanged.    -Given fevers/chills and recurrent pna, reasonable to continue below abx and deescalate when indicated  -f/u sputum, blood cx    Antibiotics (From admission, onward)      Start     Stop Route Frequency Ordered    04/30/25 0900  azithromycin (ZITHROMAX) 500 mg in 0.9% NaCl 250 mL IVPB (admixture device)         05/01/25 1259 IV Every 24 hours (non-standard times) 04/29/25 1016          Microbiology Results (last 7 days)       Procedure Component Value Units Date/Time    Blood culture x two cultures. Draw prior to antibiotics. [[id number]]  (Normal) Collected: 04/29/25 0758    Order Status: Completed Specimen: Blood from Peripheral, Forearm, Left Updated: 05/04/25 1801     Blood Culture No Growth After 5 Days    Blood culture x two cultures. Draw prior to antibiotics. [[id number]]  (Normal) Collected: 04/29/25 0758    Order Status: Completed Specimen: Blood from Peripheral, Antecubital, Left  Updated: 05/04/25 1801     Blood Culture No Growth After 5 Days    Respiratory Infection Panel (PCR), Nasopharyngeal [[id number]] Collected: 05/02/25 1027    Order Status: Completed Specimen: Nasopharyngeal Swab Updated: 05/02/25 1545     Respiratory Infection Panel Source Nasopharyngeal Swab     Adenovirus Not Detected     Coronavirus 229E, Common Cold Virus Not Detected     Coronavirus HKU1, Common Cold Virus Not Detected     Coronavirus NL63, Common Cold Virus Not Detected     Coronavirus OC43, Common Cold Virus Not Detected     SARS-CoV2 (COVID-19) Qualitative PCR Not Detected     Human Metapneumovirus Not Detected     Human Rhinovirus/Enterovirus Not Detected     Influenza A Not Detected     Influenza B Not Detected     Parainfluenza Virus 1 Not Detected     Parainfluenza Virus 2 Not Detected     Parainfluenza Virus 3 Not Detected     Parainfluenza Virus 4 Not Detected     Respiratory Syncytial Virus Not Detected     Bordetella Parapertussis ([id number]) Not Detected     Bordetella pertussis (ptxP) Not Detected     Chlamydia pneumoniae Not Detected     Mycoplasma pneumoniae Not Detected          5/1  BC x2 4/29 NGTD. vancomycins discontinued.  respiratory cultures pending.  CT chest ordered -RML GGO concerning for pneumonia possible hiatal hernia and possible anterior vertebral osteophyte near mid esophagus.  SLP consulted  and consider esophogram to assess for hiatal hernia and recurrent aspiration as a cause of repeat pneumonia. continue cefepime. completed azithromycin. denies hemoptysis this admission. last episode 2 weeks back. resumed prograf 1mg BID. continue prednisone. hold cellcept for now     Patient has a diagnosis of pneumonia. The cause of the pneumonia is suspected to be bacterial in etiology but organism is not known. The pneumonia is stable. The patient has the following signs/symptoms of pneumonia: cough. The patient does not have a current oxygen requirement and the patient does not have a home  oxygen requirement. I have reviewed the pertinent imaging. The following cultures have been collected: Blood cultures and Sputum culture The culture results are listed below.     Current antimicrobial regimen consists of the antibiotics listed below. Will monitor patient closely and continue current treatment plan unchanged.    -Given fevers/chills and recurrent pna, reasonable to continue below abx and deescalate when indicated  -f/u sputum, blood cx    Antibiotics (From admission, onward)      Start     Stop Route Frequency Ordered    04/30/25 0900  azithromycin (ZITHROMAX) 500 mg in 0.9% NaCl 250 mL IVPB (admixture device)         05/01/25 1259 IV Every 24 hours (non-standard times) 04/29/25 1016          Antibiotics (From admission, onward)      Start     Stop Route Frequency Ordered    04/30/25 0900  azithromycin (ZITHROMAX) 500 mg in 0.9% NaCl 250 mL IVPB (admixture device)         05/01/25 1259 IV Every 24 hours (non-standard times) 04/29/25 1016          5/1  PMH of DM, renal transplant 2016, CHFpEF, AF/AFL (PVI/PWI/CTI 6/2024), CVA, recurrent PNA who presents with fevers, chills.  States that symptoms are similar to last month when he was admitted for pneumonia. No other pna symptoms despite CXR changes.BC x2 4/29 NGTD. vancomycins discontinued.  respiratory cultures pending.  CT chest ordered -L GGO concerning for pneumonia possible hiatal hernia and possible anterior vertebral osteophyte near mid esophagus.  SLP consulted  and  barium esophogram to assess for hiatal hernia (second episode of pneumonia since March 2025) and recurrent aspiration as a cause of repeat pneumonia. continue cefepime. completed azithromycin. denies hemoptysis this admission. last episode 2 weeks back. resumed prograf 1mg BID. continue prednisone. hold cellcept for now     Patient has a diagnosis of pneumonia. The cause of the pneumonia is suspected to be bacterial in etiology but organism is not known. The pneumonia is stable.  The patient has the following signs/symptoms of pneumonia: cough. The patient does not have a current oxygen requirement and the patient does not have a home oxygen requirement. I have reviewed the pertinent imaging. The following cultures have been collected: Blood cultures and Sputum culture The culture results are listed below.     Current antimicrobial regimen consists of the antibiotics listed below. Will monitor patient closely and continue current treatment plan unchanged.    -Given fevers/chills and recurrent pna, reasonable to continue below abx and deescalate when indicated  -f/u sputum, blood cx    Antibiotics (From admission, onward)      Start     Stop Route Frequency Ordered    04/30/25 0900  azithromycin (ZITHROMAX) 500 mg in 0.9% NaCl 250 mL IVPB (admixture device)         05/01/25 1259 IV Every 24 hours (non-standard times) 04/29/25 1016          Microbiology Results (last 7 days)       Procedure Component Value Units Date/Time    Blood culture x two cultures. Draw prior to antibiotics. [[id number]]  (Normal) Collected: 04/29/25 0758    Order Status: Completed Specimen: Blood from Peripheral, Forearm, Left Updated: 05/04/25 1801     Blood Culture No Growth After 5 Days    Blood culture x two cultures. Draw prior to antibiotics. [[id number]]  (Normal) Collected: 04/29/25 0758    Order Status: Completed Specimen: Blood from Peripheral, Antecubital, Left Updated: 05/04/25 1801     Blood Culture No Growth After 5 Days    Respiratory Infection Panel (PCR), Nasopharyngeal [[id number]] Collected: 05/02/25 1027    Order Status: Completed Specimen: Nasopharyngeal Swab Updated: 05/02/25 1545     Respiratory Infection Panel Source Nasopharyngeal Swab     Adenovirus Not Detected     Coronavirus 229E, Common Cold Virus Not Detected     Coronavirus HKU1, Common Cold Virus Not Detected     Coronavirus NL63, Common Cold Virus Not Detected     Coronavirus OC43, Common Cold Virus Not Detected     SARS-CoV2 (COVID-19)  Qualitative PCR Not Detected     Human Metapneumovirus Not Detected     Human Rhinovirus/Enterovirus Not Detected     Influenza A Not Detected     Influenza B Not Detected     Parainfluenza Virus 1 Not Detected     Parainfluenza Virus 2 Not Detected     Parainfluenza Virus 3 Not Detected     Parainfluenza Virus 4 Not Detected     Respiratory Syncytial Virus Not Detected     Bordetella Parapertussis (JD3292) Not Detected     Bordetella pertussis (ptxP) Not Detected     Chlamydia pneumoniae Not Detected     Mycoplasma pneumoniae Not Detected          5/1  BC x2 4/29 NGTD. vancomycins discontinued.  respiratory cultures pending.  CT chest ordered -L GGO concerning for pneumonia possible hiatal hernia and possible anterior vertebral osteophyte near mid esophagus.  SLP consulted  and consider esophogram to assess for hiatal hernia and recurrent aspiration as a cause of repeat pneumonia. continue cefepime. completed azithromycin. denies hemoptysis this admission. last episode 2 weeks back. resumed prograf 1mg BID. continue prednisone. hold cellcept for now     Patient has a diagnosis of pneumonia. The cause of the pneumonia is suspected to be bacterial in etiology but organism is not known. The pneumonia is stable. The patient has the following signs/symptoms of pneumonia: cough. The patient does not have a current oxygen requirement and the patient does not have a home oxygen requirement. I have reviewed the pertinent imaging. The following cultures have been collected: Blood cultures and Sputum culture The culture results are listed below.     Current antimicrobial regimen consists of the antibiotics listed below. Will monitor patient closely and continue current treatment plan unchanged.    -Given fevers/chills and recurrent pna, reasonable to continue below abx and deescalate when indicated  -f/u sputum, blood cx    Antibiotics (From admission, onward)      Start     Stop Route Frequency Ordered    04/30/25 0900   azithromycin (ZITHROMAX) 500 mg in 0.9% NaCl 250 mL IVPB (admixture device)         05/01/25 1259 IV Every 24 hours (non-standard times) 04/29/25 1016          Antibiotics (From admission, onward)      Start     Stop Route Frequency Ordered    04/30/25 0900  azithromycin (ZITHROMAX) 500 mg in 0.9% NaCl 250 mL IVPB (admixture device)         05/01/25 1259 IV Every 24 hours (non-standard times) 04/29/25 1016            Patient has a diagnosis of pneumonia. The cause of the pneumonia is suspected to be bacterial in etiology but organism is not known. The pneumonia is stable. The patient has the following signs/symptoms of pneumonia: cough. The patient does not have a current oxygen requirement and the patient does not have a home oxygen requirement. I have reviewed the pertinent imaging. The following cultures have been collected: Blood cultures and Sputum culture The culture results are listed below.     Current antimicrobial regimen consists of the antibiotics listed below. Will monitor patient closely and continue current treatment plan unchanged.    -Given fevers/chills and recurrent pna, reasonable to continue below abx and deescalate when indicated  -f/u sputum, blood cx    Antibiotics (From admission, onward)      Start     Stop Route Frequency Ordered    04/30/25 0900  azithromycin (ZITHROMAX) 500 mg in 0.9% NaCl 250 mL IVPB (admixture device)         05/01/25 1259 IV Every 24 hours (non-standard times) 04/29/25 1016          Microbiology Results (last 7 days)       Procedure Component Value Units Date/Time    Blood culture x two cultures. Draw prior to antibiotics. [[id number]]  (Normal) Collected: 04/29/25 0758    Order Status: Completed Specimen: Blood from Peripheral, Forearm, Left Updated: 05/04/25 1801     Blood Culture No Growth After 5 Days    Blood culture x two cultures. Draw prior to antibiotics. [[id number]]  (Normal) Collected: 04/29/25 0758    Order Status: Completed Specimen: Blood from  Peripheral, Antecubital, Left Updated: 05/04/25 1801     Blood Culture No Growth After 5 Days    Respiratory Infection Panel (PCR), Nasopharyngeal [[id number]] Collected: 05/02/25 1027    Order Status: Completed Specimen: Nasopharyngeal Swab Updated: 05/02/25 1545     Respiratory Infection Panel Source Nasopharyngeal Swab     Adenovirus Not Detected     Coronavirus 229E, Common Cold Virus Not Detected     Coronavirus HKU1, Common Cold Virus Not Detected     Coronavirus NL63, Common Cold Virus Not Detected     Coronavirus OC43, Common Cold Virus Not Detected     SARS-CoV2 (COVID-19) Qualitative PCR Not Detected     Human Metapneumovirus Not Detected     Human Rhinovirus/Enterovirus Not Detected     Influenza A Not Detected     Influenza B Not Detected     Parainfluenza Virus 1 Not Detected     Parainfluenza Virus 2 Not Detected     Parainfluenza Virus 3 Not Detected     Parainfluenza Virus 4 Not Detected     Respiratory Syncytial Virus Not Detected     Bordetella Parapertussis ([id number]) Not Detected     Bordetella pertussis (ptxP) Not Detected     Chlamydia pneumoniae Not Detected     Mycoplasma pneumoniae Not Detected          5/1  BC x2 4/29 NGTD. vancomycins discontinued.  respiratory cultures pending.  CT chest ordered -RML GGO concerning for pneumonia possible hiatal hernia and possible anterior vertebral osteophyte near mid esophagus.  SLP consulted  and consider esophogram to assess for hiatal hernia and recurrent aspiration as a cause of repeat pneumonia. continue cefepime. completed azithromycin. denies hemoptysis this admission. last episode 2 weeks back. resumed prograf 1mg BID. continue prednisone. hold cellcept for now     Patient has a diagnosis of pneumonia. The cause of the pneumonia is suspected to be bacterial in etiology but organism is not known. The pneumonia is stable. The patient has the following signs/symptoms of pneumonia: cough. The patient does not have a current oxygen requirement and the  patient does not have a home oxygen requirement. I have reviewed the pertinent imaging. The following cultures have been collected: Blood cultures and Sputum culture The culture results are listed below.     Current antimicrobial regimen consists of the antibiotics listed below. Will monitor patient closely and continue current treatment plan unchanged.    -Given fevers/chills and recurrent pna, reasonable to continue below abx and deescalate when indicated  -f/u sputum, blood cx    Antibiotics (From admission, onward)      Start     Stop Route Frequency Ordered    04/30/25 0900  azithromycin (ZITHROMAX) 500 mg in 0.9% NaCl 250 mL IVPB (admixture device)         05/01/25 1259 IV Every 24 hours (non-standard times) 04/29/25 1016          Antibiotics (From admission, onward)      Start     Stop Route Frequency Ordered    04/30/25 0900  azithromycin (ZITHROMAX) 500 mg in 0.9% NaCl 250 mL IVPB (admixture device)         05/01/25 1259 IV Every 24 hours (non-standard times) 04/29/25 1016          5/1  PMH of DM, renal transplant 2016, CHFpEF, AF/AFL (PVI/PWI/CTI 6/2024), CVA, recurrent PNA who presents with fevers, chills.  States that symptoms are similar to last month when he was admitted for pneumonia. No other pna symptoms despite CXR changes.BC x2 4/29 NGTD. vancomycins discontinued.  respiratory cultures pending.  CT chest ordered -L GGO concerning for pneumonia possible hiatal hernia and possible anterior vertebral osteophyte near mid esophagus.  SLP consulted  and  barium esophogram to assess for hiatal hernia (second episode of pneumonia since March 2025) and recurrent aspiration as a cause of repeat pneumonia. continue cefepime. completed azithromycin. denies hemoptysis this admission. last episode 2 weeks back. resumed prograf 1mg BID. continue prednisone. hold cellcept for now     Patient has a diagnosis of pneumonia. The cause of the pneumonia is suspected to be bacterial in etiology but organism is not  known. The pneumonia is stable. The patient has the following signs/symptoms of pneumonia: cough. The patient does not have a current oxygen requirement and the patient does not have a home oxygen requirement. I have reviewed the pertinent imaging. The following cultures have been collected: Blood cultures and Sputum culture The culture results are listed below.     Current antimicrobial regimen consists of the antibiotics listed below. Will monitor patient closely and continue current treatment plan unchanged.    -Given fevers/chills and recurrent pna, reasonable to continue below abx and deescalate when indicated  -f/u sputum, blood cx    Antibiotics (From admission, onward)      Start     Stop Route Frequency Ordered    04/30/25 0900  azithromycin (ZITHROMAX) 500 mg in 0.9% NaCl 250 mL IVPB (admixture device)         05/01/25 1259 IV Every 24 hours (non-standard times) 04/29/25 1016          Microbiology Results (last 7 days)       Procedure Component Value Units Date/Time    Blood culture x two cultures. Draw prior to antibiotics. [[id number]]  (Normal) Collected: 04/29/25 0758    Order Status: Completed Specimen: Blood from Peripheral, Forearm, Left Updated: 05/04/25 1801     Blood Culture No Growth After 5 Days    Blood culture x two cultures. Draw prior to antibiotics. [[id number]]  (Normal) Collected: 04/29/25 0758    Order Status: Completed Specimen: Blood from Peripheral, Antecubital, Left Updated: 05/04/25 1801     Blood Culture No Growth After 5 Days    Respiratory Infection Panel (PCR), Nasopharyngeal [[id number]] Collected: 05/02/25 1027    Order Status: Completed Specimen: Nasopharyngeal Swab Updated: 05/02/25 1545     Respiratory Infection Panel Source Nasopharyngeal Swab     Adenovirus Not Detected     Coronavirus 229E, Common Cold Virus Not Detected     Coronavirus HKU1, Common Cold Virus Not Detected     Coronavirus NL63, Common Cold Virus Not Detected     Coronavirus OC43, Common Cold Virus Not  Detected     SARS-CoV2 (COVID-19) Qualitative PCR Not Detected     Human Metapneumovirus Not Detected     Human Rhinovirus/Enterovirus Not Detected     Influenza A Not Detected     Influenza B Not Detected     Parainfluenza Virus 1 Not Detected     Parainfluenza Virus 2 Not Detected     Parainfluenza Virus 3 Not Detected     Parainfluenza Virus 4 Not Detected     Respiratory Syncytial Virus Not Detected     Bordetella Parapertussis ([id number]) Not Detected     Bordetella pertussis (ptxP) Not Detected     Chlamydia pneumoniae Not Detected     Mycoplasma pneumoniae Not Detected          5/1  BC x2 4/29 NGTD. vancomycins discontinued.  respiratory cultures pending.  CT chest ordered -RML GGO concerning for pneumonia possible hiatal hernia and possible anterior vertebral osteophyte near mid esophagus.  SLP consulted  and consider esophogram to assess for hiatal hernia and recurrent aspiration as a cause of repeat pneumonia. continue cefepime. completed azithromycin. denies hemoptysis this admission. last episode 2 weeks back. resumed prograf 1mg BID. continue prednisone. hold cellcept for now     Patient has a diagnosis of pneumonia. The cause of the pneumonia is suspected to be bacterial in etiology but organism is not known. The pneumonia is stable. The patient has the following signs/symptoms of pneumonia: cough. The patient does not have a current oxygen requirement and the patient does not have a home oxygen requirement. I have reviewed the pertinent imaging. The following cultures have been collected: Blood cultures and Sputum culture The culture results are listed below.     Current antimicrobial regimen consists of the antibiotics listed below. Will monitor patient closely and continue current treatment plan unchanged.    -Given fevers/chills and recurrent pna, reasonable to continue below abx and deescalate when indicated  -f/u sputum, blood cx    Antibiotics (From admission, onward)      Start     Stop Route  "Frequency Ordered    04/30/25 0900  azithromycin (ZITHROMAX) 500 mg in 0.9% NaCl 250 mL IVPB (admixture device)         05/01/25 1259 IV Every 24 hours (non-standard times) 04/29/25 1016          Antibiotics (From admission, onward)      Start     Stop Route Frequency Ordered    04/30/25 0900  azithromycin (ZITHROMAX) 500 mg in 0.9% NaCl 250 mL IVPB (admixture device)         05/01/25 1259 IV Every 24 hours (non-standard times) 04/29/25 1016          Type 2 diabetes mellitus with stage 3a chronic kidney disease, with long-term current use of insulin  Creatine stable for now. BMP reviewed- noted Estimated Creatinine Clearance: 23.8 mL/min (A) (based on SCr of 3.4 mg/dL (H)). according to latest data. Based on current GFR, CKD stage is stage 3 - GFR 30-59.  Monitor UOP and serial BMP and adjust therapy as needed. Renally dose meds. Avoid nephrotoxic medications and procedures.  Hyperlipidemia  Continue statin    Prophylactic immunotherapy  KTM consutled for assistance with management     KTM consutled for assistance with management     KTM consutled for assistance with management     Chronic combined systolic (congestive) and diastolic (congestive) heart failure  Patient has Diastolic (HFpEF) heart failure that is Chronic. On presentation their CHF was well compensated. Most recent BNP and echo results are listed below.  No results for input(s): "BNP" in the last 72 hours.  Latest ECHO  Results for orders placed during the hospital encounter of 02/20/25    Echo    Interpretation Summary    Left Ventricle: There is mild concentric hypertrophy. There is low normal systolic function with a visually estimated ejection fraction of 50 - 55%. Grade III diastolic dysfunction.    Left Ventricle: The left ventricle is at the upper limits of normal in size. Mildly increased wall thickness. There is mild concentric hypertrophy. Regional wall motion abnormalities present. See diagram for wall motion findings. There is low normal " systolic function with a visually estimated ejection fraction of 50 - 55%. Ejection fraction is approximately 53%. Grade III diastolic dysfunction.    Right Ventricle: Systolic function is borderline low despite the low TAPSE.    Left Atrium: Left atrium is moderately dilated.    Aortic Valve: The aortic valve is a trileaflet valve. There is mild aortic valve sclerosis. There is moderate annular calcification present. There is mild aortic regurgitation with a centrally directed jet.    Mitral Valve: There is mild regurgitation with a centrally directed jet.    Pericardium: There is a trivial posterior effusion and under the RA.    Tricuspid Valve: There is mild regurgitation.    Pulmonary Artery: The estimated pulmonary artery systolic pressure is 30 mmHg.    Current Heart Failure Medications  hydrALAZINE tablet 100 mg, Every 8 hours, Oral  empagliflozin (Jardiance) tablet 10 mg, Daily, Oral  hydrALAZINE injection 15 mg, Every 6 hours PRN, Intravenous  eplerenone tablet 50 mg, Daily, Oral  eplerenone (INSPRA) tablet, Daily, Oral  valsartan tablet 320 mg, Daily, Oral  hydroCHLOROthiazide tablet 25 mg, Daily, Oral  hydrochlorothiazide (HYDRODIURIL) tablet, Daily, Oral    Plan  - Monitor strict I&Os and daily weights.    - Place on telemetry  - Low sodium diet  - Cardiology has not been consulted  - The patient's volume status is at their baseline  S/P cadaveric kidney transplant 11/5/2016. ESRD secondary to HTN/DMII  -KTM consulted  -daily tacro level, dosing per KTM  -Cont prednisone    Immunocompromised state due to drug therapy  KTM consutled for assistance with management     KTM consutled for assistance with management     KTM consutled for assistance with management     CKD IV (severe)  Creatine stable for now. BMP reviewed- noted Estimated Creatinine Clearance: 23.8 mL/min (A) (based on SCr of 3.4 mg/dL (H)). according to latest data. Based on current GFR, CKD stage is stage 3 - GFR 30-59.  Monitor UOP and  serial BMP and adjust therapy as needed. Renally dose meds. Avoid nephrotoxic medications and procedures.    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 monitor BP  5/7 Cr trended up to 3.4. KTM f/u  -recs 500cc bolus of LR, changing his torsemide back to HCTZ 25mg daily, and restarting his valsartan. monitor renal function in AM   Type 2 diabetes mellitus  Patient's FSGs are controlled on current medication regimen.  Last A1c reviewed-   Lab Results   Component Value Date    HGBA1C 8.3 (H) 03/27/2025     Most recent fingerstick glucose reviewed-   Recent Labs   Lab 05/06/25  2148 05/07/25  0800 05/07/25  1222 05/07/25  1543   POCTGLUCOSE 115* 84 85 192*     Current correctional scale  Medium  Maintain anti-hyperglycemic dose as follows-   Antihyperglycemics (From admission, onward)      Start     Stop Route Frequency Ordered    05/06/25 1415  insulin aspart U-100 insulin pump from home        Question Answer Comment   Target number 110    Basal Rate #1 0.6    Basal rate #1 time [id number]        -- SubQ Continuous 05/06/25 1303    05/06/25 1402  insulin aspart U-100 insulin pump from home 0-10 Units        Question Answer Comment   Target number 110    Carbohydrate coverage #1 7.5    Carbohydrate coverage #1 time [id number]    Sensitivity #1 40    Sensitivity #1 time [id number]        -- SubQ As needed (PRN) 05/06/25 1303    04/29/25 1115  empagliflozin (Jardiance) tablet 10 mg        Question Answer Comment   Does this patient have a diagnosis of heart failure? Yes    Does this patient have type 1 diabetes or diabetic ketoacidosis? No    Does this patient have symptomatic  hypotension? No    Is the patient NPO or pending major surgery in next 3 days or less? No        -- Oral Daily 04/29/25 1016          Hold Oral hypoglycemics while patient is in the hospital.    Blood glucose acceptable  Persistent atrial fibrillation  Patient has persistent (7 days or more) atrial fibrillation. Patient is currently in atrial fibrillation. [id number] Score: 3. The patients heart rate in the last 24 hours is as follows:  Pulse  Min: 75  Max: 117     Antiarrhythmics       Anticoagulants  rivaroxaban tablet 15 mg, With dinner, Oral    Plan  - Replete lytes with a goal of K>4, Mg >2  - Patient is anticoagulated, see medications listed above.  - Patient's afib is currently controlled  Anticoagulant long-term use  This patient has long term use on an anticoagulant with Select Anticoagulant(s): Direct oral anticoagulant: Rivaroxaban (Xarelto). Their long term anticoagulation will be Held or Continued: continued. They are on long term anticoagulation due to Reason for Anticoagulation: Atrial fibrillation.   BPH with urinary obstruction  Doxazosin   Immunosuppressed status  KTM consutled for assistance with management     KTM consutled for assistance with management     KTM consutled for assistance with management     Anemia of chronic disease  Anemia is likely due to chronic disease due to Chronic Kidney Disease. Most recent hemoglobin and hematocrit are listed below.  Recent Labs     05/05/25  0513 05/06/25  0644 05/07/25  0328   HGB 9.0* 8.6* 9.1*   HCT 31.5* 29.6* 31.1*     Plan  - Monitor serial CBC: Daily  - Transfuse PRBC if patient becomes hemodynamically unstable, symptomatic or H/H drops below 7/21.  - Patient has not received any PRBC transfusions to date  - Patient's anemia is currently stable  Hypokalemia  Patient's most recent potassium results are listed below.   Recent Labs     05/05/25  0513 05/06/25  0644 05/07/25  0328   K 3.9 3.4* 3.5     Plan  - Replete potassium per protocol  - Monitor  potassium Daily  - Patient's hypokalemia is stable  Pleural effusion  5/2 CT chest -Bilateral multilobar consolidations and ground-glass opacities associated with bronchial thickening suggestive of infectious/inflammatory process. Right moderate and left small pleural effusions.  ID consulted for recurrent pneumonia/ immunosuppressed/ hemoptysis.    Hypertensive urgency  Patient has a current diagnosis of hypertensive urgency (without evidence of end organ damage) which is uncontrolled.  Latest blood pressure and vitals reviewed-   Temp:  [97.8 °F (36.6 °C)-98.5 °F (36.9 °C)]   Pulse:  []   Resp:  [18-20]   BP: (127-163)/(69-87)   SpO2:  [98 %-100 %] .   Patient currently on IV antihypertensives.   Home meds for hypertension were reviewed and noted below.   Hypertension Medications              doxazosin (CARDURA) 4 MG tablet Take 1 tablet (4 mg total) by mouth once daily.    furosemide (LASIX) 40 MG tablet Take 2 tablets (80 mg total) by mouth daily as needed (for swelling).    hydrALAZINE (APRESOLINE) 100 MG tablet Take 1 tablet (100 mg total) by mouth every 8 (eight) hours.    hydroCHLOROthiazide 12.5 MG Tab Take 1 tablet (12.5 mg total) by mouth once daily.    valsartan (DIOVAN) 320 MG tablet Take 1 tablet (320 mg total) by mouth once daily.            5/3 SBP 200s overnight. started on IV hydralzine. received IV hydralazine 15mg x 2 doses. /84.   5/4 SBP 200s overnight. received hydralazine 15mg x 1 overnight. discussed with KTM. Cr stable. resume valsartan. Clonidine 0.1 mg PRN for SBP > 170mm HG   5/5 SBP 180s overnight. SBP 180s overnight. started on clonidine patch and  eplerenone 50mg daily.   5/6 SBP 160s. monitor.     PAD (peripheral artery disease)  5/6  C/o clauadication pain left LE   WENDI -Right Leg: Segmental pressures and PVR waveforms suggest moderate peripheral arterial occlusive disease. Rt Great Toe: The PPG waveform as described above. - TBI of 0.55 with a great toe pressure of 95  mmHg is noted. Left Leg: Segmental pressures and PVR waveforms suggest moderate peripheral arterial occlusive disease. Lt Great Toe: The PPG waveform as described above. - TBI of 0.59 with a great toe pressure of 101 mmHg is noted.   vascular surgery follow up   Pneumonia of left lower lobe due to infectious organism  as above     Antibiotics (From admission, onward)      Start     Stop Route Frequency Ordered    04/30/25 0900  azithromycin (ZITHROMAX) 500 mg in 0.9% NaCl 250 mL IVPB (admixture device)         05/01/25 1259 IV Every 24 hours (non-standard times) 04/29/25 1016            Microbiology Results (last 7 days)       Procedure Component Value Units Date/Time    Blood culture x two cultures. Draw prior to antibiotics. [[id number]]  (Normal) Collected: 04/29/25 0758    Order Status: Completed Specimen: Blood from Peripheral, Forearm, Left Updated: 05/04/25 1801     Blood Culture No Growth After 5 Days    Blood culture x two cultures. Draw prior to antibiotics. [[id number]]  (Normal) Collected: 04/29/25 0758    Order Status: Completed Specimen: Blood from Peripheral, Antecubital, Left Updated: 05/04/25 1801     Blood Culture No Growth After 5 Days    Respiratory Infection Panel (PCR), Nasopharyngeal [[id number]] Collected: 05/02/25 1027    Order Status: Completed Specimen: Nasopharyngeal Swab Updated: 05/02/25 1545     Respiratory Infection Panel Source Nasopharyngeal Swab     Adenovirus Not Detected     Coronavirus 229E, Common Cold Virus Not Detected     Coronavirus HKU1, Common Cold Virus Not Detected     Coronavirus NL63, Common Cold Virus Not Detected     Coronavirus OC43, Common Cold Virus Not Detected     SARS-CoV2 (COVID-19) Qualitative PCR Not Detected     Human Metapneumovirus Not Detected     Human Rhinovirus/Enterovirus Not Detected     Influenza A Not Detected     Influenza B Not Detected     Parainfluenza Virus 1 Not Detected     Parainfluenza Virus 2 Not Detected     Parainfluenza Virus 3 Not  Detected     Parainfluenza Virus 4 Not Detected     Respiratory Syncytial Virus Not Detected     Bordetella Parapertussis (OA9922) Not Detected     Bordetella pertussis (ptxP) Not Detected     Chlamydia pneumoniae Not Detected     Mycoplasma pneumoniae Not Detected          Insulin pump in place      Opportunistic infection        VTE Risk Mitigation (From admission, onward)           Ordered     rivaroxaban tablet 15 mg  With dinner         04/29/25 1016     IP VTE HIGH RISK PATIENT  Once         04/29/25 1016     Place sequential compression device  Until discontinued         04/29/25 1016                    Discharge Planning   UBALDO: 5/8/2025     Code Status: Full Code   Medical Readiness for Discharge Date:   Discharge Plan A: Home with family   Discharge Delays: None known at this time                    Cliff Guaman MD  Department of Hospital Medicine   [facility]         [1]   Current Facility-Administered Medications on File Prior to Encounter   Medication Dose Route Frequency Provider Last Rate Last Admin    balanced salt irrigation intra-ocular solution 1 drop  1 drop Right Eye On Call Procedure Jennifer Palacio MD        phenylephrine HCL 10% ophthalmic solution 1 drop  1 drop Right Eye PRN Jennifer Palacio MD        proparacaine 0.5 % ophthalmic solution 1 drop  1 drop Right Eye Daily PRN Jennifer Palacio MD        sodium chloride 0.9% flush 10 mL  10 mL Intravenous PRN Jennifer Palacio MD        TETRAcaine HCl (PF) 0.5 % Drop 1 drop  1 drop Right Eye PRN Jennifer Palacio MD         Current Outpatient Medications on File Prior to Encounter   Medication Sig Dispense Refill    atorvastatin (LIPITOR) 80 MG tablet Take 1 tablet (80 mg total) by mouth once daily. 90 tablet 3    blood sugar diagnostic Strp Use to check blood glucose 1 times daily, to use with insurance preferred meter 100 each 11    blood-glucose meter Misc Use to check blood glucose 1 times daily, to use with insurance  "preferred meter 1 each 0    blood-glucose sensor (DEXCOM G7 SENSOR) Kayla Change sensor every 10 days. 3 each 11    empagliflozin (JARDIANCE) 10 mg tablet Take 1 tablet (10 mg total) by mouth once daily. 90 tablet 0    ergocalciferol (ERGOCALCIFEROL) 50,000 unit Cap Take 1 capsule (50,000 Units total) by mouth every 7 days. 4 capsule 6    hydrALAZINE (APRESOLINE) 100 MG tablet Take 1 tablet (100 mg total) by mouth every 8 (eight) hours. 270 tablet 3    insulin aspart U-100 (NOVOLOG U-100 INSULIN ASPART) 100 unit/mL injection Use in pump, max daily 100 units. 30 mL 11    insulin  cart,aut,G6/7,cntr (OMNIPOD 5 G6-G7 INTRO KT,GEN5,) Crtg Use as directed. 1 each 1    insulin pump cart,auto,BT,G6/7 (OMNIPOD 5 G6-G7 PODS, GEN 5,) Crtg Change every 48 hours. 45 each 3    Lactobacillus rhamnosus GG (CULTURELLE) 10 billion cell capsule Take 1 capsule by mouth once daily. 30 capsule 0    lancets 30 gauge Misc Use to check blood glucose 1 times daily, to use with insurance preferred meter 100 each 3    magnesium oxide (MAG-OX) 400 mg (241.3 mg magnesium) tablet Take 1 tablet (400 mg total) by mouth 2 (two) times daily. 60 tablet 11    meclizine (ANTIVERT) 25 mg tablet Take 1 tablet (25 mg total) by mouth 3 (three) times daily as needed for Dizziness. 21 tablet 3    [Paused] mycophenolate (CELLCEPT) 250 mg Cap Take 2 capsules (500 mg total) by mouth 2 (two) times daily. 120 capsule 11    pen needle, diabetic (BD ULTRA-FINE LO PEN NEEDLE) 32 gauge x 5/32" Ndle USE TO ADMINISTER INSULIN 4 TIMES DAILY. 400 each 3    polyethylene glycol (MIRALAX) 17 gram PwPk Take 17 gm (mixed in liquid) by mouth every day.      predniSONE (DELTASONE) 5 MG tablet Take 1 tablet (5 mg total) by mouth once daily. 30 tablet 11    rivaroxaban (XARELTO) 15 mg Tab Take 1 tablet (15 mg total) by mouth daily with dinner or evening meal. 30 tablet 11    tacrolimus (PROGRAF) 1 MG Cap Take 1 capsule (1 mg total) by mouth every 12 (twelve) hours. 60 capsule " 11    valsartan (DIOVAN) 320 MG tablet Take 1 tablet (320 mg total) by mouth once daily. 90 tablet 3    [DISCONTINUED] insulin lispro (HUMALOG KWIKPEN INSULIN) 100 unit/mL pen Inject 18 units w/ breakfast, 16 units w/ lunch and dinner plus scale 150-200 +2, 201-250 +4, 251-300 +6, 301-350 +8. 30 mL 4

## 2025-05-07 NOTE — ASSESSMENT & PLAN NOTE
Patient has a diagnosis of pneumonia. The cause of the pneumonia is suspected to be bacterial in etiology but organism is not known. The pneumonia is stable. The patient has the following signs/symptoms of pneumonia: cough. The patient does not have a current oxygen requirement and the patient does not have a home oxygen requirement. I have reviewed the pertinent imaging. The following cultures have been collected: Blood cultures and Sputum culture The culture results are listed below.     Current antimicrobial regimen consists of the antibiotics listed below. Will monitor patient closely and continue current treatment plan unchanged.    -Given fevers/chills and recurrent pna, reasonable to continue below abx and deescalate when indicated  -f/u sputum, blood cx    Antibiotics (From admission, onward)      Start     Stop Route Frequency Ordered    04/30/25 0900  azithromycin (ZITHROMAX) 500 mg in 0.9% NaCl 250 mL IVPB (admixture device)         05/01/25 1259 IV Every 24 hours (non-standard times) 04/29/25 1016          Microbiology Results (last 7 days)       Procedure Component Value Units Date/Time    Blood culture x two cultures. Draw prior to antibiotics. [[id number]]  (Normal) Collected: 04/29/25 0758    Order Status: Completed Specimen: Blood from Peripheral, Forearm, Left Updated: 05/04/25 1801     Blood Culture No Growth After 5 Days    Blood culture x two cultures. Draw prior to antibiotics. [[id number]]  (Normal) Collected: 04/29/25 0758    Order Status: Completed Specimen: Blood from Peripheral, Antecubital, Left Updated: 05/04/25 1801     Blood Culture No Growth After 5 Days    Respiratory Infection Panel (PCR), Nasopharyngeal [[id number]] Collected: 05/02/25 1027    Order Status: Completed Specimen: Nasopharyngeal Swab Updated: 05/02/25 1545     Respiratory Infection Panel Source Nasopharyngeal Swab     Adenovirus Not Detected     Coronavirus 229E, Common Cold Virus Not Detected     Coronavirus HKU1,  Common Cold Virus Not Detected     Coronavirus NL63, Common Cold Virus Not Detected     Coronavirus OC43, Common Cold Virus Not Detected     SARS-CoV2 (COVID-19) Qualitative PCR Not Detected     Human Metapneumovirus Not Detected     Human Rhinovirus/Enterovirus Not Detected     Influenza A Not Detected     Influenza B Not Detected     Parainfluenza Virus 1 Not Detected     Parainfluenza Virus 2 Not Detected     Parainfluenza Virus 3 Not Detected     Parainfluenza Virus 4 Not Detected     Respiratory Syncytial Virus Not Detected     Bordetella Parapertussis ([id number]) Not Detected     Bordetella pertussis (ptxP) Not Detected     Chlamydia pneumoniae Not Detected     Mycoplasma pneumoniae Not Detected          5/1  BC x2 4/29 NGTD. vancomycins discontinued.  respiratory cultures pending.  CT chest ordered -RML GGO concerning for pneumonia possible hiatal hernia and possible anterior vertebral osteophyte near mid esophagus.  SLP consulted  and consider esophogram to assess for hiatal hernia and recurrent aspiration as a cause of repeat pneumonia. continue cefepime. completed azithromycin. denies hemoptysis this admission. last episode 2 weeks back. resumed prograf 1mg BID. continue prednisone. hold cellcept for now     Patient has a diagnosis of pneumonia. The cause of the pneumonia is suspected to be bacterial in etiology but organism is not known. The pneumonia is stable. The patient has the following signs/symptoms of pneumonia: cough. The patient does not have a current oxygen requirement and the patient does not have a home oxygen requirement. I have reviewed the pertinent imaging. The following cultures have been collected: Blood cultures and Sputum culture The culture results are listed below.     Current antimicrobial regimen consists of the antibiotics listed below. Will monitor patient closely and continue current treatment plan unchanged.    -Given fevers/chills and recurrent pna, reasonable to continue below  abx and deescalate when indicated  -f/u sputum, blood cx    Antibiotics (From admission, onward)      Start     Stop Route Frequency Ordered    04/30/25 0900  azithromycin (ZITHROMAX) 500 mg in 0.9% NaCl 250 mL IVPB (admixture device)         05/01/25 1259 IV Every 24 hours (non-standard times) 04/29/25 1016          Antibiotics (From admission, onward)      Start     Stop Route Frequency Ordered    04/30/25 0900  azithromycin (ZITHROMAX) 500 mg in 0.9% NaCl 250 mL IVPB (admixture device)         05/01/25 1259 IV Every 24 hours (non-standard times) 04/29/25 1016          5/1  PMH of DM, renal transplant 2016, CHFpEF, AF/AFL (PVI/PWI/CTI 6/2024), CVA, recurrent PNA who presents with fevers, chills.  States that symptoms are similar to last month when he was admitted for pneumonia. No other pna symptoms despite CXR changes.BC x2 4/29 NGTD. vancomycins discontinued.  respiratory cultures pending.  CT chest ordered -RML GGO concerning for pneumonia possible hiatal hernia and possible anterior vertebral osteophyte near mid esophagus.  SLP consulted  and  barium esophogram to assess for hiatal hernia (second episode of pneumonia since March 2025) and recurrent aspiration as a cause of repeat pneumonia. continue cefepime. completed azithromycin. denies hemoptysis this admission. last episode 2 weeks back. resumed prograf 1mg BID. continue prednisone. hold cellcept for now     Patient has a diagnosis of pneumonia. The cause of the pneumonia is suspected to be bacterial in etiology but organism is not known. The pneumonia is stable. The patient has the following signs/symptoms of pneumonia: cough. The patient does not have a current oxygen requirement and the patient does not have a home oxygen requirement. I have reviewed the pertinent imaging. The following cultures have been collected: Blood cultures and Sputum culture The culture results are listed below.     Current antimicrobial regimen consists of the antibiotics  listed below. Will monitor patient closely and continue current treatment plan unchanged.    -Given fevers/chills and recurrent pna, reasonable to continue below abx and deescalate when indicated  -f/u sputum, blood cx    Antibiotics (From admission, onward)      Start     Stop Route Frequency Ordered    04/30/25 0900  azithromycin (ZITHROMAX) 500 mg in 0.9% NaCl 250 mL IVPB (admixture device)         05/01/25 1259 IV Every 24 hours (non-standard times) 04/29/25 1016          Microbiology Results (last 7 days)       Procedure Component Value Units Date/Time    Blood culture x two cultures. Draw prior to antibiotics. [[id number]]  (Normal) Collected: 04/29/25 0758    Order Status: Completed Specimen: Blood from Peripheral, Forearm, Left Updated: 05/04/25 1801     Blood Culture No Growth After 5 Days    Blood culture x two cultures. Draw prior to antibiotics. [[id number]]  (Normal) Collected: 04/29/25 0758    Order Status: Completed Specimen: Blood from Peripheral, Antecubital, Left Updated: 05/04/25 1801     Blood Culture No Growth After 5 Days    Respiratory Infection Panel (PCR), Nasopharyngeal [[id number]] Collected: 05/02/25 1027    Order Status: Completed Specimen: Nasopharyngeal Swab Updated: 05/02/25 1545     Respiratory Infection Panel Source Nasopharyngeal Swab     Adenovirus Not Detected     Coronavirus 229E, Common Cold Virus Not Detected     Coronavirus HKU1, Common Cold Virus Not Detected     Coronavirus NL63, Common Cold Virus Not Detected     Coronavirus OC43, Common Cold Virus Not Detected     SARS-CoV2 (COVID-19) Qualitative PCR Not Detected     Human Metapneumovirus Not Detected     Human Rhinovirus/Enterovirus Not Detected     Influenza A Not Detected     Influenza B Not Detected     Parainfluenza Virus 1 Not Detected     Parainfluenza Virus 2 Not Detected     Parainfluenza Virus 3 Not Detected     Parainfluenza Virus 4 Not Detected     Respiratory Syncytial Virus Not Detected     Bordetella  Parapertussis (IB5506) Not Detected     Bordetella pertussis (ptxP) Not Detected     Chlamydia pneumoniae Not Detected     Mycoplasma pneumoniae Not Detected          5/1  BC x2 4/29 NGTD. vancomycins discontinued.  respiratory cultures pending.  CT chest ordered -L GGO concerning for pneumonia possible hiatal hernia and possible anterior vertebral osteophyte near mid esophagus.  SLP consulted  and consider esophogram to assess for hiatal hernia and recurrent aspiration as a cause of repeat pneumonia. continue cefepime. completed azithromycin. denies hemoptysis this admission. last episode 2 weeks back. resumed prograf 1mg BID. continue prednisone. hold cellcept for now     Patient has a diagnosis of pneumonia. The cause of the pneumonia is suspected to be bacterial in etiology but organism is not known. The pneumonia is stable. The patient has the following signs/symptoms of pneumonia: cough. The patient does not have a current oxygen requirement and the patient does not have a home oxygen requirement. I have reviewed the pertinent imaging. The following cultures have been collected: Blood cultures and Sputum culture The culture results are listed below.     Current antimicrobial regimen consists of the antibiotics listed below. Will monitor patient closely and continue current treatment plan unchanged.    -Given fevers/chills and recurrent pna, reasonable to continue below abx and deescalate when indicated  -f/u sputum, blood cx    Antibiotics (From admission, onward)      Start     Stop Route Frequency Ordered    04/30/25 0900  azithromycin (ZITHROMAX) 500 mg in 0.9% NaCl 250 mL IVPB (admixture device)         05/01/25 1259 IV Every 24 hours (non-standard times) 04/29/25 1016          Antibiotics (From admission, onward)      Start     Stop Route Frequency Ordered    04/30/25 0900  azithromycin (ZITHROMAX) 500 mg in 0.9% NaCl 250 mL IVPB (admixture device)         05/01/25 1259 IV Every 24 hours (non-standard  times) 04/29/25 1016            Patient has a diagnosis of pneumonia. The cause of the pneumonia is suspected to be bacterial in etiology but organism is not known. The pneumonia is stable. The patient has the following signs/symptoms of pneumonia: cough. The patient does not have a current oxygen requirement and the patient does not have a home oxygen requirement. I have reviewed the pertinent imaging. The following cultures have been collected: Blood cultures and Sputum culture The culture results are listed below.     Current antimicrobial regimen consists of the antibiotics listed below. Will monitor patient closely and continue current treatment plan unchanged.    -Given fevers/chills and recurrent pna, reasonable to continue below abx and deescalate when indicated  -f/u sputum, blood cx    Antibiotics (From admission, onward)      Start     Stop Route Frequency Ordered    04/30/25 0900  azithromycin (ZITHROMAX) 500 mg in 0.9% NaCl 250 mL IVPB (admixture device)         05/01/25 1259 IV Every 24 hours (non-standard times) 04/29/25 1016          Microbiology Results (last 7 days)       Procedure Component Value Units Date/Time    Blood culture x two cultures. Draw prior to antibiotics. [[id number]]  (Normal) Collected: 04/29/25 0758    Order Status: Completed Specimen: Blood from Peripheral, Forearm, Left Updated: 05/04/25 1801     Blood Culture No Growth After 5 Days    Blood culture x two cultures. Draw prior to antibiotics. [[id number]]  (Normal) Collected: 04/29/25 0758    Order Status: Completed Specimen: Blood from Peripheral, Antecubital, Left Updated: 05/04/25 1801     Blood Culture No Growth After 5 Days    Respiratory Infection Panel (PCR), Nasopharyngeal [[id number]] Collected: 05/02/25 1027    Order Status: Completed Specimen: Nasopharyngeal Swab Updated: 05/02/25 1545     Respiratory Infection Panel Source Nasopharyngeal Swab     Adenovirus Not Detected     Coronavirus 229E, Common Cold Virus Not  Detected     Coronavirus HKU1, Common Cold Virus Not Detected     Coronavirus NL63, Common Cold Virus Not Detected     Coronavirus OC43, Common Cold Virus Not Detected     SARS-CoV2 (COVID-19) Qualitative PCR Not Detected     Human Metapneumovirus Not Detected     Human Rhinovirus/Enterovirus Not Detected     Influenza A Not Detected     Influenza B Not Detected     Parainfluenza Virus 1 Not Detected     Parainfluenza Virus 2 Not Detected     Parainfluenza Virus 3 Not Detected     Parainfluenza Virus 4 Not Detected     Respiratory Syncytial Virus Not Detected     Bordetella Parapertussis ([id number]) Not Detected     Bordetella pertussis (ptxP) Not Detected     Chlamydia pneumoniae Not Detected     Mycoplasma pneumoniae Not Detected          5/1  BC x2 4/29 NGTD. vancomycins discontinued.  respiratory cultures pending.  CT chest ordered -RML GGO concerning for pneumonia possible hiatal hernia and possible anterior vertebral osteophyte near mid esophagus.  SLP consulted  and consider esophogram to assess for hiatal hernia and recurrent aspiration as a cause of repeat pneumonia. continue cefepime. completed azithromycin. denies hemoptysis this admission. last episode 2 weeks back. resumed prograf 1mg BID. continue prednisone. hold cellcept for now     Patient has a diagnosis of pneumonia. The cause of the pneumonia is suspected to be bacterial in etiology but organism is not known. The pneumonia is stable. The patient has the following signs/symptoms of pneumonia: cough. The patient does not have a current oxygen requirement and the patient does not have a home oxygen requirement. I have reviewed the pertinent imaging. The following cultures have been collected: Blood cultures and Sputum culture The culture results are listed below.     Current antimicrobial regimen consists of the antibiotics listed below. Will monitor patient closely and continue current treatment plan unchanged.    -Given fevers/chills and recurrent  pna, reasonable to continue below abx and deescalate when indicated  -f/u sputum, blood cx    Antibiotics (From admission, onward)      Start     Stop Route Frequency Ordered    04/30/25 0900  azithromycin (ZITHROMAX) 500 mg in 0.9% NaCl 250 mL IVPB (admixture device)         05/01/25 1259 IV Every 24 hours (non-standard times) 04/29/25 1016          Antibiotics (From admission, onward)      Start     Stop Route Frequency Ordered    04/30/25 0900  azithromycin (ZITHROMAX) 500 mg in 0.9% NaCl 250 mL IVPB (admixture device)         05/01/25 1259 IV Every 24 hours (non-standard times) 04/29/25 1016          5/1  PMH of DM, renal transplant 2016, CHFpEF, AF/AFL (PVI/PWI/CTI 6/2024), CVA, recurrent PNA who presents with fevers, chills.  States that symptoms are similar to last month when he was admitted for pneumonia. No other pna symptoms despite CXR changes.BC x2 4/29 NGTD. vancomycins discontinued.  respiratory cultures pending.  CT chest ordered -L GGO concerning for pneumonia possible hiatal hernia and possible anterior vertebral osteophyte near mid esophagus.  SLP consulted  and  barium esophogram to assess for hiatal hernia (second episode of pneumonia since March 2025) and recurrent aspiration as a cause of repeat pneumonia. continue cefepime. completed azithromycin. denies hemoptysis this admission. last episode 2 weeks back. resumed prograf 1mg BID. continue prednisone. hold cellcept for now     Patient has a diagnosis of pneumonia. The cause of the pneumonia is suspected to be bacterial in etiology but organism is not known. The pneumonia is stable. The patient has the following signs/symptoms of pneumonia: cough. The patient does not have a current oxygen requirement and the patient does not have a home oxygen requirement. I have reviewed the pertinent imaging. The following cultures have been collected: Blood cultures and Sputum culture The culture results are listed below.     Current antimicrobial  regimen consists of the antibiotics listed below. Will monitor patient closely and continue current treatment plan unchanged.    -Given fevers/chills and recurrent pna, reasonable to continue below abx and deescalate when indicated  -f/u sputum, blood cx    Antibiotics (From admission, onward)      Start     Stop Route Frequency Ordered    04/30/25 0900  azithromycin (ZITHROMAX) 500 mg in 0.9% NaCl 250 mL IVPB (admixture device)         05/01/25 1259 IV Every 24 hours (non-standard times) 04/29/25 1016          Microbiology Results (last 7 days)       Procedure Component Value Units Date/Time    Blood culture x two cultures. Draw prior to antibiotics. [[id number]]  (Normal) Collected: 04/29/25 0758    Order Status: Completed Specimen: Blood from Peripheral, Forearm, Left Updated: 05/04/25 1801     Blood Culture No Growth After 5 Days    Blood culture x two cultures. Draw prior to antibiotics. [[id number]]  (Normal) Collected: 04/29/25 0758    Order Status: Completed Specimen: Blood from Peripheral, Antecubital, Left Updated: 05/04/25 1801     Blood Culture No Growth After 5 Days    Respiratory Infection Panel (PCR), Nasopharyngeal [[id number]] Collected: 05/02/25 1027    Order Status: Completed Specimen: Nasopharyngeal Swab Updated: 05/02/25 1545     Respiratory Infection Panel Source Nasopharyngeal Swab     Adenovirus Not Detected     Coronavirus 229E, Common Cold Virus Not Detected     Coronavirus HKU1, Common Cold Virus Not Detected     Coronavirus NL63, Common Cold Virus Not Detected     Coronavirus OC43, Common Cold Virus Not Detected     SARS-CoV2 (COVID-19) Qualitative PCR Not Detected     Human Metapneumovirus Not Detected     Human Rhinovirus/Enterovirus Not Detected     Influenza A Not Detected     Influenza B Not Detected     Parainfluenza Virus 1 Not Detected     Parainfluenza Virus 2 Not Detected     Parainfluenza Virus 3 Not Detected     Parainfluenza Virus 4 Not Detected     Respiratory Syncytial  Virus Not Detected     Bordetella Parapertussis ([id number]) Not Detected     Bordetella pertussis (ptxP) Not Detected     Chlamydia pneumoniae Not Detected     Mycoplasma pneumoniae Not Detected          5/1  BC x2 4/29 NGTD. vancomycins discontinued.  respiratory cultures pending.  CT chest ordered -L GGO concerning for pneumonia possible hiatal hernia and possible anterior vertebral osteophyte near mid esophagus.  SLP consulted  and consider esophogram to assess for hiatal hernia and recurrent aspiration as a cause of repeat pneumonia. continue cefepime. completed azithromycin. denies hemoptysis this admission. last episode 2 weeks back. resumed prograf 1mg BID. continue prednisone. hold cellcept for now     Patient has a diagnosis of pneumonia. The cause of the pneumonia is suspected to be bacterial in etiology but organism is not known. The pneumonia is stable. The patient has the following signs/symptoms of pneumonia: cough. The patient does not have a current oxygen requirement and the patient does not have a home oxygen requirement. I have reviewed the pertinent imaging. The following cultures have been collected: Blood cultures and Sputum culture The culture results are listed below.     Current antimicrobial regimen consists of the antibiotics listed below. Will monitor patient closely and continue current treatment plan unchanged.    -Given fevers/chills and recurrent pna, reasonable to continue below abx and deescalate when indicated  -f/u sputum, blood cx    Antibiotics (From admission, onward)      Start     Stop Route Frequency Ordered    04/30/25 0900  azithromycin (ZITHROMAX) 500 mg in 0.9% NaCl 250 mL IVPB (admixture device)         05/01/25 1259 IV Every 24 hours (non-standard times) 04/29/25 1016          Antibiotics (From admission, onward)      Start     Stop Route Frequency Ordered    04/30/25 0900  azithromycin (ZITHROMAX) 500 mg in 0.9% NaCl 250 mL IVPB (admixture device)         05/01/25 1259  IV Every 24 hours (non-standard times) 04/29/25 1016

## 2025-05-07 NOTE — PLAN OF CARE
05/07/25 1721   Post-Acute Status   Post-Acute Authorization Other   Coverage [employer] MANAGED MEDICARE - [employer] MED[employer] HMO - CAPITATED   Other Status No Post-Acute Service Needs   Discharge Delays None known at this time   Discharge Plan   Discharge Plan A Home with family   Discharge Plan B Home     Laadn met with pt to discuss dc planning assessment. Pt is not medically ready for dc and per pt no needs at this time.     Discharge Plan A and Plan B have been determined by review of patient's clinical status, future medical and therapeutic needs, and coverage/benefits for post-acute care in coordination with multidisciplinary team members.    LADAN Titus  Case Management  [phone]

## 2025-05-07 NOTE — PROGRESS NOTES
"Arvind Jay - [facility]  Endocrinology  Progress Note    Admit Date: 2025     Reason for Consult: Management of T2DM, Hyperglycemia     Diabetes diagnosis year:      Home Diabetes Medications:    OMNIPOD 5  0.6 u/hr mn-0600 , 9p-mn, 0.7 u/hr 6a-9p   Target 120-130 mg/dl   Iob 3 hours  Max bolus 20 units   Max basal 2 u/hr   Isf 40 mn-mn  Icr 1 to 7.5      Presets:  Snack: 4 units (28g)  Small Meal: 8 units (56g)  Medium Meal: 12 units (90g)  Large Meal: 15 units (114g)  Super Large Meal: 18 units (130g)    How often checking glucose at home? >4 x day   BG readings on regimen: 150s  Hypoglycemia on the regimen?  No  Missed doses on regimen?  No    Diabetes Complications include:     Hyperglycemia    Complicating diabetes co morbidities:   S/p Kidney tx        HPI:   Patient is a 69 y.o. male with history of DM, renal transplant , CHFpEF, AF/AFL (PVI/PWI/CTI 2024), CVA, recurrent PNA who presents with fevers, chills. States that symptoms are similar to last month when he was admitted for pneumonia. Was in his usual state of health until last night when he started experiencing intermittent fevers, chills. Endocrine consulted for BG management.         Interval HPI:   No acute events overnight. Patient in room 632/632 A. Blood glucose stable. BG at goal on current insulin regimen (Home Insulin Pump). Steroid use- Prednisone 5 mg QD.      Renal function-   Lab Results   Component Value Date    CREATININE 3.4 (H) 2025        Vasopressors-  None     Diet Consistent Carbohydrate 2000 Calories (up to 75 gm per meal)     Eatin%  Nausea: No  Hypoglycemia and intervention: No  Fever: No  TPN and/or TF: No    BP (!) 158/84 (Patient Position: Lying)   Pulse 75   Temp 98.1 °F (36.7 °C) (Oral)   Resp 18   Ht 6' 1" (1.854 m)   Wt 90.7 kg (200 lb)   SpO2 98%   BMI 26.39 kg/m²     Labs Reviewed and Include    Recent Labs   Lab 25  0328   GLU 98   CALCIUM 8.7      K 3.5   CO2 23    " "  BUN 49*   CREATININE 3.4*     Lab Results   Component Value Date    WBC 5.96 05/07/2025    HGB 9.1 (L) 05/07/2025    HCT 31.1 (L) 05/07/2025    MCV 78 (L) 05/07/2025     (L) 05/07/2025     No results for input(s): "TSH", "FREET4" in the last 168 hours.  Lab Results   Component Value Date    HGBA1C 8.3 (H) 03/27/2025       Nutritional status:   Body mass index is 26.39 kg/m².  Lab Results   Component Value Date    ALBUMIN 2.6 (L) 05/03/2025    ALBUMIN 2.8 (L) 04/29/2025    ALBUMIN 3.1 (L) 04/09/2025     No results found for: "PREALBUMIN"    Estimated Creatinine Clearance: 23.8 mL/min (A) (based on SCr of 3.4 mg/dL (H)).    Accu-Checks  Recent Labs     05/05/25  0816 05/05/25  1210 05/05/25  1605 05/05/25  2219 05/05/25  2305 05/06/25  0814 05/06/25  1008 05/06/25  1149 05/06/25  1623 05/06/25  2148   POCTGLUCOSE 161* 234* 221* 57* 123* 64* 244* 172* 135* 115*       Current Medications and/or Treatments Impacting Glycemic Control  Immunotherapy:    Immunosuppressants           Stop Route Frequency     tacrolimus capsule 1 mg         -- Oral 2 times daily          Steroids:   Hormones (From admission, onward)      Start     Stop Route Frequency Ordered    04/29/25 1115  predniSONE tablet 5 mg         -- Oral Daily 04/29/25 1016    04/29/25 1112  melatonin tablet 6 mg         -- Oral Nightly PRN 04/29/25 1016          Pressors:    Autonomic Drugs (From admission, onward)      None          Hyperglycemia/Diabetes Medications:   Antihyperglycemics (From admission, onward)      Start     Stop Route Frequency Ordered    05/06/25 1415  insulin aspart U-100 insulin pump from home        Question Answer Comment   Target number 110    Basal Rate #1 0.6    Basal rate #1 time 8396-6445        -- SubQ Continuous 05/06/25 1303    05/06/25 1402  insulin aspart U-100 insulin pump from home 0-10 Units        Question Answer Comment   Target number 110    Carbohydrate coverage #1 7.5    Carbohydrate coverage #1 time 3896-5375  "   Sensitivity #1 40    Sensitivity #1 time [id number]        -- SubQ As needed (PRN) 05/06/25 1303    04/29/25 1115  empagliflozin (Jardiance) tablet 10 mg        Question Answer Comment   Does this patient have a diagnosis of heart failure? Yes    Does this patient have type 1 diabetes or diabetic ketoacidosis? No    Does this patient have symptomatic hypotension? No    Is the patient NPO or pending major surgery in next 3 days or less? No        -- Oral Daily 04/29/25 1016            ASSESSMENT and PLAN    Pulmonary  * Community acquired bacterial pneumonia  Managed per primary team        Endocrine  Insulin pump in place  At time of evaluation, pt meets criteria to continue home insulin pump usage.  - Has all adequate supplies   - Insulin pump site change on 5-5-2025.    - Bolus settings reviewed    - No changes to home regimen.   - Nurse to check BG qac/hs/0200 & record in epic   - Patient to input glucose into pump and use bolus wizard for prandial needs   - Will continue to monitor accuchecks and titrate insulin as clinically indicated .     - Discussed above plan with patient, patient verbalized understanding.   - Understands in case of pump malfunction or cognitive decline in which pt can no longer safely use insulin pump, will transition to SC MDI          If pump malfunctions or is disconnected, please call endocrine       Type 2 diabetes mellitus with stage 3a chronic kidney disease, with long-term current use of insulin  BG goal: 140-180    - Continue home insulin pump   - POCT Glucose before meals, at bedtime and at 2 am  - Hypoglycemia protocol in place      ** Please notify Endocrine for any change and/or advance in diet**  ** Please call Endocrine for any BG related issues **     Discharge Planning:   TBD. Please notify endocrinology prior to discharge.            Malina Slaughter PA-C  Endocrinology  [facility] - Med Surg

## 2025-05-08 VITALS
BODY MASS INDEX: 26.51 KG/M2 | SYSTOLIC BLOOD PRESSURE: 138 MMHG | HEIGHT: 73 IN | RESPIRATION RATE: 16 BRPM | OXYGEN SATURATION: 100 % | HEART RATE: 103 BPM | TEMPERATURE: 97 F | DIASTOLIC BLOOD PRESSURE: 72 MMHG | WEIGHT: 200 LBS

## 2025-05-08 LAB
ABSOLUTE EOSINOPHIL (OHS): 0.09 K/UL
ABSOLUTE MONOCYTE (OHS): 1.19 K/UL (ref 0.3–1)
ABSOLUTE NEUTROPHIL COUNT (OHS): 2.58 K/UL (ref 1.8–7.7)
ANION GAP (OHS): 9 MMOL/L (ref 8–16)
ANISOCYTOSIS BLD QL SMEAR: SLIGHT
BASOPHILS # BLD AUTO: 0.05 K/UL
BASOPHILS NFR BLD AUTO: 0.9 %
BUN SERPL-MCNC: 52 MG/DL (ref 8–23)
CALCIUM SERPL-MCNC: 8.4 MG/DL (ref 8.7–10.5)
CHLORIDE SERPL-SCNC: 105 MMOL/L (ref 95–110)
CO2 SERPL-SCNC: 21 MMOL/L (ref 23–29)
CREAT SERPL-MCNC: 3.1 MG/DL (ref 0.5–1.4)
DACRYOCYTES BLD QL SMEAR: ABNORMAL
ERYTHROCYTE [DISTWIDTH] IN BLOOD BY AUTOMATED COUNT: 19.3 % (ref 11.5–14.5)
GFR SERPLBLD CREATININE-BSD FMLA CKD-EPI: 21 ML/MIN/1.73/M2
GLUCOSE SERPL-MCNC: 92 MG/DL (ref 70–110)
HCT VFR BLD AUTO: 29.8 % (ref 40–54)
HGB BLD-MCNC: 8.7 GM/DL (ref 14–18)
IMM GRANULOCYTES # BLD AUTO: 0.02 K/UL (ref 0–0.04)
IMM GRANULOCYTES NFR BLD AUTO: 0.4 % (ref 0–0.5)
LYMPHOCYTES # BLD AUTO: 1.43 K/UL (ref 1–4.8)
MAGNESIUM SERPL-MCNC: 1.6 MG/DL (ref 1.6–2.6)
MCH RBC QN AUTO: 22.5 PG (ref 27–31)
MCHC RBC AUTO-ENTMCNC: 29.2 G/DL (ref 32–36)
MCV RBC AUTO: 77 FL (ref 82–98)
NUCLEATED RBC (/100WBC) (OHS): 0 /100 WBC
OVALOCYTES BLD QL SMEAR: ABNORMAL
PHOSPHATE SERPL-MCNC: 4.1 MG/DL (ref 2.7–4.5)
PLATELET # BLD AUTO: 142 K/UL (ref 150–450)
PLATELET BLD QL SMEAR: ABNORMAL
PMV BLD AUTO: ABNORMAL FL
POCT GLUCOSE: 97 MG/DL (ref 70–110)
POTASSIUM SERPL-SCNC: 3.3 MMOL/L (ref 3.5–5.1)
RBC # BLD AUTO: 3.86 M/UL (ref 4.6–6.2)
RELATIVE EOSINOPHIL (OHS): 1.7 %
RELATIVE LYMPHOCYTE (OHS): 26.7 % (ref 18–48)
RELATIVE MONOCYTE (OHS): 22.2 % (ref 4–15)
RELATIVE NEUTROPHIL (OHS): 48.1 % (ref 38–73)
SCHISTOCYTES BLD QL SMEAR: ABNORMAL
SCHISTOCYTES BLD QL SMEAR: PRESENT
SODIUM SERPL-SCNC: 135 MMOL/L (ref 136–145)
SPHEROCYTES BLD QL SMEAR: ABNORMAL
TACROLIMUS BLD-MCNC: 5.5 NG/ML (ref 5–15)
WBC # BLD AUTO: 5.36 K/UL (ref 3.9–12.7)

## 2025-05-08 PROCEDURE — 63600175 PHARM REV CODE 636 W HCPCS: Mod: HCNC | Performed by: STUDENT IN AN ORGANIZED HEALTH CARE EDUCATION/TRAINING PROGRAM

## 2025-05-08 PROCEDURE — 84100 ASSAY OF PHOSPHORUS: CPT | Mod: HCNC | Performed by: STUDENT IN AN ORGANIZED HEALTH CARE EDUCATION/TRAINING PROGRAM

## 2025-05-08 PROCEDURE — 25000003 PHARM REV CODE 250: Mod: HCNC | Performed by: HOSPITALIST

## 2025-05-08 PROCEDURE — 25000003 PHARM REV CODE 250: Mod: HCNC

## 2025-05-08 PROCEDURE — 25000003 PHARM REV CODE 250: Mod: HCNC | Performed by: STUDENT IN AN ORGANIZED HEALTH CARE EDUCATION/TRAINING PROGRAM

## 2025-05-08 PROCEDURE — 80048 BASIC METABOLIC PNL TOTAL CA: CPT | Mod: HCNC | Performed by: STUDENT IN AN ORGANIZED HEALTH CARE EDUCATION/TRAINING PROGRAM

## 2025-05-08 PROCEDURE — 80197 ASSAY OF TACROLIMUS: CPT | Mod: HCNC | Performed by: STUDENT IN AN ORGANIZED HEALTH CARE EDUCATION/TRAINING PROGRAM

## 2025-05-08 PROCEDURE — 63600175 PHARM REV CODE 636 W HCPCS: Mod: HCNC | Performed by: INTERNAL MEDICINE

## 2025-05-08 PROCEDURE — 85025 COMPLETE CBC W/AUTO DIFF WBC: CPT | Mod: HCNC | Performed by: STUDENT IN AN ORGANIZED HEALTH CARE EDUCATION/TRAINING PROGRAM

## 2025-05-08 PROCEDURE — 83735 ASSAY OF MAGNESIUM: CPT | Mod: HCNC | Performed by: STUDENT IN AN ORGANIZED HEALTH CARE EDUCATION/TRAINING PROGRAM

## 2025-05-08 PROCEDURE — 36415 COLL VENOUS BLD VENIPUNCTURE: CPT | Mod: HCNC | Performed by: STUDENT IN AN ORGANIZED HEALTH CARE EDUCATION/TRAINING PROGRAM

## 2025-05-08 PROCEDURE — 99233 SBSQ HOSP IP/OBS HIGH 50: CPT | Mod: HCNC,GC,, | Performed by: STUDENT IN AN ORGANIZED HEALTH CARE EDUCATION/TRAINING PROGRAM

## 2025-05-08 RX ORDER — POTASSIUM CHLORIDE 20 MEQ/1
40 TABLET, EXTENDED RELEASE ORAL ONCE
Status: CANCELLED | OUTPATIENT
Start: 2025-05-08 | End: 2025-05-08

## 2025-05-08 RX ADMIN — EPLERENONE 50 MG: 25 TABLET, FILM COATED ORAL at 09:05

## 2025-05-08 RX ADMIN — VALSARTAN 320 MG: 160 TABLET, FILM COATED ORAL at 09:05

## 2025-05-08 RX ADMIN — EMPAGLIFLOZIN 10 MG: 10 TABLET, FILM COATED ORAL at 09:05

## 2025-05-08 RX ADMIN — ACETAMINOPHEN 1000 MG: 500 TABLET ORAL at 09:05

## 2025-05-08 RX ADMIN — HYDRALAZINE HYDROCHLORIDE 100 MG: 50 TABLET ORAL at 06:05

## 2025-05-08 RX ADMIN — GABAPENTIN 300 MG: 300 CAPSULE ORAL at 09:05

## 2025-05-08 RX ADMIN — DOXAZOSIN 8 MG: 4 TABLET ORAL at 09:05

## 2025-05-08 RX ADMIN — HYDRALAZINE HYDROCHLORIDE 100 MG: 50 TABLET ORAL at 01:05

## 2025-05-08 RX ADMIN — TACROLIMUS 1 MG: 1 CAPSULE ORAL at 09:05

## 2025-05-08 RX ADMIN — PREDNISONE 5 MG: 5 TABLET ORAL at 09:05

## 2025-05-08 RX ADMIN — HYDROCHLOROTHIAZIDE 25 MG: 25 TABLET ORAL at 09:05

## 2025-05-08 RX ADMIN — Medication 1 CAPSULE: at 09:05

## 2025-05-08 RX ADMIN — GABAPENTIN 300 MG: 300 CAPSULE ORAL at 12:05

## 2025-05-08 RX ADMIN — ATORVASTATIN CALCIUM 80 MG: 40 TABLET, FILM COATED ORAL at 09:05

## 2025-05-08 NOTE — PROGRESS NOTES
Name: Ravi Zamorano  Medical Record Number: [id number]  Date of Service: 05/08/2025  Note By: Honey Marley MD    RENAL TRANSPLANT PROGRESS NOTE    ORGAN: RIGHT KIDNEY  Donor Type: donation after brain death  PHS Increased Risk: yes  Cold Ischemia: 314 mins  Induction Medications: thymoglobulin    Reason for Follow-up: Reassessment of kidney transplant recipient and management of immunosuppression.    Summary: Mr. Zamorano is a 69 y.o. male with history of ESRD presumably 2/2 HTN/T2DM s/p DBDKT (11/5/2016) on tacrolimus/cellcept/pred, CKD4 post-transplant, HTN, CAD, who was admitted on 4/29/2025 for pneumonia.    Interval History: Patient reports feeling well this morning. Continues to make good amount of urine. Eating and drinking well. Happy to hear that his renal function is improving today.     PMHx:  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    CKD (chronic kidney disease) stage 4, GFR 15-29 ml/min 07/29/2014    Colon polyps 2014    Depression - situational 07/29/2014    Diabetes type 2 since 2000 07/29/2014    Diabetic neuropathy 07/29/2014    Encounter for blood transfusion     History of cardioversion 10/03/2019    History of hepatitis C, s/p successful Rx w/ SVR24 - 9/2017 07/29/2014    Genotype 1a, treatment naive 10/2014 liver biopsy - grade 1 / stage 1 Completed Harvoni w/ SVR    Hyperlipidemia 07/29/2014    Hypertension     Neuropathy     Organ transplant candidate 07/29/2014    Pancreatitis     S/P cadaveric kidney transplant 11/5/2016. ESRD secondary to HTN/DMII 11/05/2016    Type 2 diabetes mellitus with stage 3a chronic kidney disease, with long-term current use of insulin 07/29/2014     Medications:   Scheduled Meds:   acetaminophen  1,000 mg Oral Q12H    atorvastatin  80 mg Oral Daily    cloNIDine 0.1 mg/24 hr td ptwk  1 patch Transdermal  Q7 Days    doxazosin  8 mg Oral Daily    empagliflozin  10 mg Oral Daily    eplerenone  50 mg Oral Daily    gabapentin  300 mg Oral Daily    gabapentin  300 mg Oral After lunch    gabapentin  300 mg Oral QHS    hydrALAZINE  100 mg Oral Q8H    hydroCHLOROthiazide  25 mg Oral Daily    Lactobacillus rhamnosus GG  1 capsule Oral Daily    polyethylene glycol  17 g Oral Daily    predniSONE  5 mg Oral Daily    rivaroxaban  15 mg Oral Daily with dinner    tacrolimus  1 mg Oral BID    valsartan  320 mg Oral Daily     Continuous Infusions:   insulin aspart U-100  0.6 Units/hr Subcutaneous Continuous 0.01 mL/hr at 05/06/25 1415 0.6 Units/hr at 05/06/25 1415       PRN Meds:.  Current Facility-Administered Medications:     albuterol-ipratropium, 3 mL, Nebulization, Q6H PRN    dextrose 50%, 12.5 g, Intravenous, PRN    dextrose 50%, 12.5 g, Intravenous, PRN    dextrose 50%, 25 g, Intravenous, PRN    dextrose 50%, 25 g, Intravenous, PRN    glucagon (human recombinant), 1 mg, Intramuscular, PRN    glucagon (human recombinant), 1 mg, Intramuscular, PRN    glucose, 16 g, Oral, PRN    glucose, 16 g, Oral, PRN    glucose, 24 g, Oral, PRN    glucose, 24 g, Oral, PRN    hydrALAZINE, 15 mg, Intravenous, Q6H PRN    insulin aspart U-100, 0-10 Units, Subcutaneous, PRN    meclizine, 25 mg, Oral, TID PRN    melatonin, 6 mg, Oral, Nightly PRN    naloxone, 0.02 mg, Intravenous, PRN    ondansetron, 8 mg, Oral, Q8H PRN    prochlorperazine, 5 mg, Intravenous, Q6H PRN    sodium chloride 0.9%, 10 mL, Intravenous, Q8H PRN    Review of Systems:   Reviewed. Pertinent positives and negatives included above.    Physical Exam:  Vitals:  Vitals:    05/08/25 0731   BP: (!) 149/76   Pulse: 73   Resp: 17   Temp: 98.2 °F (36.8 °C)     Temp:  [97.4 °F (36.3 °C)-98.5 °F (36.9 °C)] 98.2 °F (36.8 °C)  Pulse:  [] 73  Resp:  [17-20] 17  SpO2:  [98 %-100 %] 100 %  BP: (127-179)/(72-89) 149/76    Exam  General: alert, conversational, NAD, sitting up in  chair  HEENT: moist mucus membranes  Respiratory: CTAB, breathing comfortably on RA  Cardiovacular: RRR, no murmur appreciated  Gastrointestinal: soft, nontender  Renal allograft exam: No tenderness, no bruits   Extremities: trace edema in BLE    Labs:  Lab Results   Component Value Date    WBC 5.36 05/08/2025    HGB 8.7 (L) 05/08/2025    HCT 29.8 (L) 05/08/2025     (L) 05/08/2025    K 3.3 (L) 05/08/2025     05/08/2025    CO2 21 (L) 05/08/2025    BUN 52 (H) 05/08/2025    CREATININE 3.1 (H) 05/08/2025    EGFRNONAA 44.9 (A) 07/19/2022    CALCIUM 8.4 (L) 05/08/2025    PHOS 4.1 05/08/2025    MG 1.6 05/08/2025    ALBUMIN 2.6 (L) 05/03/2025    AST 14 04/29/2025    ALT <5 (L) 04/29/2025     Imaging Studies:  CXR 4/29/25:  Heart is enlarged, but allowing for magnification of the cardiomediastinal silhouette related both to projection and to a poorer inspiratory depth level than on the prior exam, I doubt that the heart has changed appreciably in size since that time. However, there has been a detrimental interval change in the appearance of the chest, as the current examination demonstrates pulmonary parenchymal opacity in the right lower lung zone consistent with the development of airspace consolidation since the prior exam. Given the patient's clinical history, this may represent acute pneumonia. The left lung and the remainder of the right lung are clear. No pleural fluid of any substantial volume is seen on either side. No pneumothorax. ?    Assessment/Plan:  69 y.o. male with ESRD presumably 2/2 HTN/T2DM s/p DDRT (11/5/2016) on tacrolimus//cellcept/pred, who is admitted for RLL pneumonia.     ESRD 2/2 HTN/T2DM s/p cadaveric kidney transplant (11/5/16)  PHUONG on CKD4  - baseline Cr ~2's with eGFR ~20s  - suspect PHUONG 2/2 hemodynamic instability (adjustment of antihypertensives) and some volume depletion (torsemide)  - Cr trending down today after 500cc LR  - okay to discharge from renal standpoint  - will have  patient follow up with Dr. Lopez in 1 month for repeat labs    Immunosuppression Management  - tac level 5.5 this morning  - continue Prograf 1mg BID; goal trough level is 5-7ng/ml  - continue prednisone 5mg daily  - continue Tac 1mg BID and prednisone 5mg on discharge  - due to recurrent pneumonias, will hold MMF on discharge and re-evaluate at 1 month follow up for re-initiation    Chronic anemia  - Hb currently ~8-9   - Tsat 30% (02/2025)  - last known ferritin in system 800s in 04/2024  - patient receives EPO injections outpatient  - continue to monitor    HTN  - BP improving  - has had gingival hyperplasia with nifedipine, bradycardia (down to 20s) with coreg  - continue clonidine 0.1mg patch, doxazosin 8mg, eplerenone 50mg, hydralazine 100mg TID, HCTZ 25mg, and valsartan 320mg      Honey Marley MD  [facility] Nephrology Fellow, PGY-4  Kidney Transplant Medicine

## 2025-05-08 NOTE — DISCHARGE INSTRUCTIONS
.Our goal at [facility] is to always give you outstanding care and exceptional service. You may receive a survey from [employer] by mail, text or e-mail in the next 24-48 hours asking about the care you received with us. The survey should only take 5-10 minutes to complete and is very important to us.     Your feedback provides us with a way to recognize our staff who work tirelessly to provide the best care! Also, your responses help us learn how to improve when your experience was below our aspiration of excellence. We are always looking for ways to improve your stay. We WILL use your feedback to continue making improvements to help us provide the highest quality care. We keep your personal information and feedback confidential. We appreciate your time completing this survey and can't wait to hear from you!!!    We look forward to your continued care with us! Thanks so much for choosing [facility] for your healthcare needs!

## 2025-05-08 NOTE — SUBJECTIVE & OBJECTIVE
"Interval HPI:   No acute events overnight. Patient in room 632/632 A. Blood glucose stable. BG at goal on current insulin regimen (Home Insulin Pump). Steroid use- Prednisone 5 mg QD.   Renal function-   Lab Results   Component Value Date    CREATININE 3.1 (H) 2025          Vasopressors-  None      Diet Consistent Carbohydrate 2000 Calories (up to 75 gm per meal)      Eatin%  Nausea: No  Hypoglycemia and intervention: No  Fever: No  TPN and/or TF: No       BP (!) 149/76 (BP Location: Right arm, Patient Position: Sitting)   Pulse 73   Temp 98.2 °F (36.8 °C) (Oral)   Resp 17   Ht 6' 1" (1.854 m)   Wt 90.7 kg (200 lb)   SpO2 100%   BMI 26.39 kg/m²     Labs Reviewed and Include    Recent Labs   Lab 25  0557   GLU 92   CALCIUM 8.4*   *   K 3.3*   CO2 21*      BUN 52*   CREATININE 3.1*     Lab Results   Component Value Date    WBC 5.36 2025    HGB 8.7 (L) 2025    HCT 29.8 (L) 2025    MCV 77 (L) 2025     (L) 2025     No results for input(s): "TSH", "FREET4" in the last 168 hours.  Lab Results   Component Value Date    HGBA1C 8.3 (H) 2025       Nutritional status:   Body mass index is 26.39 kg/m².  Lab Results   Component Value Date    ALBUMIN 2.6 (L) 2025    ALBUMIN 2.8 (L) 2025    ALBUMIN 3.1 (L) 2025     No results found for: "PREALBUMIN"    Estimated Creatinine Clearance: 26.1 mL/min (A) (based on SCr of 3.1 mg/dL (H)).    Accu-Checks  Recent Labs     25  2305 25  0814 25  1008 25  1149 25  1623 25  2148 25  0800 25  1222 25  1543 25  2031   POCTGLUCOSE 123* 64* 244* 172* 135* 115* 84 85 192* 188*       Current Medications and/or Treatments Impacting Glycemic Control  Immunotherapy:    Immunosuppressants           Stop Route Frequency     tacrolimus capsule 1 mg         -- Oral 2 times daily          Steroids:   Hormones (From admission, onward)      Start     " Stop Route Frequency Ordered    04/29/25 1115  predniSONE tablet 5 mg         -- Oral Daily 04/29/25 1016    04/29/25 1112  melatonin tablet 6 mg         -- Oral Nightly PRN 04/29/25 1016          Pressors:    Autonomic Drugs (From admission, onward)      None          Hyperglycemia/Diabetes Medications:   Antihyperglycemics (From admission, onward)      Start     Stop Route Frequency Ordered    05/06/25 1415  insulin aspart U-100 insulin pump from home        Question Answer Comment   Target number 110    Basal Rate #1 0.6    Basal rate #1 time 9291-7903        -- SubQ Continuous 05/06/25 1303    05/06/25 1402  insulin aspart U-100 insulin pump from home 0-10 Units        Question Answer Comment   Target number 110    Carbohydrate coverage #1 7.5    Carbohydrate coverage #1 time 8348-2669    Sensitivity #1 40    Sensitivity #1 time 4852-7873        -- SubQ As needed (PRN) 05/06/25 1303    04/29/25 1115  empagliflozin (Jardiance) tablet 10 mg        Question Answer Comment   Does this patient have a diagnosis of heart failure? Yes    Does this patient have type 1 diabetes or diabetic ketoacidosis? No    Does this patient have symptomatic hypotension? No    Is the patient NPO or pending major surgery in next 3 days or less? No        -- Oral Daily 04/29/25 1016

## 2025-05-08 NOTE — PLAN OF CARE
Problem: Adult Inpatient Plan of Care  Goal: Plan of Care Review  5/8/2025 1410 by Fortino Caballero RN  Outcome: Met  5/8/2025 1409 by Fortino Caballero RN  Outcome: Progressing  Goal: Patient-Specific Goal (Individualized)  5/8/2025 1410 by Fortino Caballero RN  Outcome: Met  5/8/2025 1409 by Fortino Caballero RN  Outcome: Progressing  Goal: Absence of Hospital-Acquired Illness or Injury  5/8/2025 1410 by Fortino Caballero RN  Outcome: Met  5/8/2025 1409 by Fortino Caballero RN  Outcome: Progressing  Goal: Optimal Comfort and Wellbeing  5/8/2025 1410 by Fortino Caballero RN  Outcome: Met  5/8/2025 1409 by Fortino Caballero RN  Outcome: Progressing  Goal: Readiness for Transition of Care  5/8/2025 1410 by Fortino Caballero RN  Outcome: Met  5/8/2025 1409 by Fortino Caballero RN  Outcome: Progressing     Problem: Infection  Goal: Absence of Infection Signs and Symptoms  5/8/2025 1410 by Fortino Caballero RN  Outcome: Met  5/8/2025 1409 by Fortino Caballero RN  Outcome: Progressing     Problem: Acute Kidney Injury/Impairment  Goal: Fluid and Electrolyte Balance  5/8/2025 1410 by Fortino Caballero RN  Outcome: Met  5/8/2025 1409 by Fortino Caballero RN  Outcome: Progressing  Goal: Improved Oral Intake  5/8/2025 1410 by Fortino Caballero RN  Outcome: Met  5/8/2025 1409 by Fortino Caballero RN  Outcome: Progressing  Goal: Effective Renal Function  5/8/2025 1410 by Fortino Caballero RN  Outcome: Met  5/8/2025 1409 by Fortino Caballero RN  Outcome: Progressing     Problem: Pneumonia  Goal: Fluid Balance  5/8/2025 1410 by Fortino Caballero RN  Outcome: Met  5/8/2025 1409 by Fortino Caballero RN  Outcome: Progressing  Goal: Resolution of Infection Signs and Symptoms  5/8/2025 1410 by Fortino Caballero RN  Outcome: Met  5/8/2025 1409 by Ada, Fortino, RN  Outcome: Progressing  Goal: Effective Oxygenation and Ventilation  5/8/2025 1410 by Fortino Caballero, RN  Outcome: Met  5/8/2025 1409 by Fortino Caballero, RN  Outcome: Progressing     Problem: Diabetes Comorbidity  Goal: Blood Glucose  Level Within Targeted Range  5/8/2025 1410 by Fortino Caballero RN  Outcome: Met  5/8/2025 1409 by Fortino Caballero RN  Outcome: Progressing     Problem: Fall Injury Risk  Goal: Absence of Fall and Fall-Related Injury  5/8/2025 1410 by Fortino Caballero, RN  Outcome: Met  5/8/2025 1409 by Fortino Caballero, RN  Outcome: Progressing     Problem: Hypertension Acute  Goal: Blood Pressure Within Desired Range  5/8/2025 1410 by Fortino Caballero, RN  Outcome: Met  5/8/2025 1409 by Fortino Caballero RN  Outcome: Progressing

## 2025-05-12 ENCOUNTER — EPISODE CHANGES (OUTPATIENT)
Dept: CARDIOLOGY | Facility: CLINIC | Age: 70
End: 2025-05-12

## 2025-05-14 NOTE — PROGRESS NOTES
CHIEF COMPLAINT: Type 2 Diabetes     HPI: Mr. Ravi Zamorano is a 69 y.o. male who was diagnosed with Type 2 DM in 2000.  S/p kidney transplant 2016  Accompanied by niece.  Recent hospitalization for pneumonia 3/2025.   Started dexcom g7, omnipod 5  [place]   TIR 80%   Avg 143 mg/dl   Cv 29.8%   Sd 43 mg/dl   Lowest 48 mg/dl   Median 135 mg/dl   Highest 284 mg/dl   Lowest 48 mg/dl   Auto 100%   Carbs/day 139.7 gm /day  Tdd 30.6 units   Small, medium presets done per PHIL Stout.    Iob 3 hours  Isf correction 40   Target 120-130 mg/dl   Insulin/carb ratio 1 to 7.5%   Max 2 u/hr   Mn 0.6 u/hr  6a 0.7 u/hr   9p 0.6 u/hr     F/u with cards, had ablation in 2024.   F/u with hem/onc, thrombocytopenia   Had bone marrow in summer 2024.   Having false lows.  6/2024, PHUONG, edema.  Still dealing with ankle edema.    Has f/u with nephrology this month and cardiology in 2025-jan.   H/o severe hypoglycemia < 38 mg/dl  On maintenance w/ pred.    Dietary habits:   Eggs (2), toasts or muffin wheat , turkey sausage   Skips lunch at times  Potato salad, sweet peas, meat, rice  A1c improved 8.6% to 8.3%     12/7/2024 embolic stroke , (L) weakness  4/29/25 8 days, pneumonia (L) lung  Lab Results   Component Value Date    HGBA1C 8.3 (H) 03/27/2025       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    CKD (chronic kidney disease) stage 4, GFR 15-29 ml/min 07/29/2014    Colon polyps 2014    Depression - situational 07/29/2014    Diabetes type 2 since 2000 07/29/2014    Diabetic neuropathy 07/29/2014    Encounter for blood transfusion     History of cardioversion 10/03/2019    History of hepatitis C, s/p successful Rx w/ SVR24 - 9/2017 07/29/2014    Genotype 1a, treatment naive 10/2014 liver biopsy - grade 1 / stage 1 Completed Harvoni w/ SVR    Hyperlipidemia 07/29/2014     Hypertension     Neuropathy     Organ transplant candidate 07/29/2014    Pancreatitis     S/P cadaveric kidney transplant 11/5/2016. ESRD secondary to HTN/DMII 11/05/2016    Type 2 diabetes mellitus with stage 3a chronic kidney disease, with long-term current use of insulin 07/29/2014     Remains on MDI---lantus and humalog     PREVIOUS DIABETES MEDICATIONS TRIED  lantus  humalog  novolog  levemir  trulicity -wt loss   tradjenta  Metformin   ozempic   Jardiance     CURRENT DIABETIC MEDS: novolog vials, jardiance 10 mg daily   Pump:     Mn 0.6 u/hr  0600 0.7 u/hr  9p 0.6 u/hr  Active insulin 3 hours  Correction factor 40   Insulin/carb ratio 1 to 7.5   Target 120-130 mg/dl  2 hours max basal     Makes frequent changes to his/her insulin regimen on the basis of blood glucose data  Testing 4 x a day max   Patient is willing and able to use the device  Demonstrated an understanding of the technology and is motivated to use CGM  Patient expected to adhere to a comprehensive diabetes treatment plan and patient has adequate medical supervision    Has multiple impaired awareness of hypoglycemia (hypoglycemia unawareness)  Needs work with dietary habits    Diabetes Management Status    Statin: Taking  ACE/ARB: Taking    Screening or Prevention Patient's value Goal Complete/Controlled?   HgA1C Testing and Control   Lab Results   Component Value Date    HGBA1C 8.3 (H) 03/27/2025      Annually/Less than 8% Yes   Lipid profile : 12/07/2024 Annually Yes   LDL control Lab Results   Component Value Date    LDLCALC 105.4 12/07/2024    Annually/Less than 100 mg/dl  Yes   Nephropathy screening Lab Results   Component Value Date    LABMICR >5000.0 12/06/2024     Lab Results   Component Value Date    PROTEINUA 2+ (A) 04/29/2025    Annually Yes   Blood pressure BP Readings from Last 1 Encounters:   05/08/25 138/72    Less than 140/90 No   Dilated retinal exam : 08/08/2024 Annually Yes   Foot exam   : 07/03/2024 Annually No     REVIEW OF  "SYSTEMS  General: no weakness, fatigue, + weight fluctuations 1-10#  Eyes: no double or blurred vision, eye pain, or redness  Cardiovascular: no chest pain, palpitations, + ankle edema, or murmurs.   Respiratory: no cough or dyspnea.   GI: no heartburn, nausea, or changes in bowel patterns; good appetite.   Skin: no rashes, dryness, itching, or reactions at insulin injection sites.  Neuro: + numbness, tingling-gabapentin,RLS, tremors, or vertigo. +loss of strength in legs  Endocrine: no polyuria, polydipsia, polyphagia, heat or cold intolerance.     Vital Signs  BP (!) 142/64 (BP Location: Right arm, Patient Position: Sitting)   Pulse 78   Ht 6' 1" (1.854 m)   Wt 95.3 kg (210 lb 1.6 oz)   SpO2 99%   BMI 27.72 kg/m²     Hemoglobin A1C   Date Value Ref Range Status   01/27/2025 8.6 (H) 4.0 - 5.6 % Final     Comment:     ADA Screening Guidelines:  5.7-6.4%  Consistent with prediabetes  >or=6.5%  Consistent with diabetes    High levels of fetal hemoglobin interfere with the HbA1C  assay. Heterozygous hemoglobin variants (HbS, HgC, etc)do  not significantly interfere with this assay.   However, presence of multiple variants may affect accuracy.     12/07/2024 8.1 (H) 4.0 - 5.6 % Final     Comment:     ADA Screening Guidelines:  5.7-6.4%  Consistent with prediabetes  >or=6.5%  Consistent with diabetes    High levels of fetal hemoglobin interfere with the HbA1C  assay. Heterozygous hemoglobin variants (HbS, HgC, etc)do  not significantly interfere with this assay.   However, presence of multiple variants may affect accuracy.     11/19/2024 7.4 (H) 4.0 - 5.6 % Final     Comment:     ADA Screening Guidelines:  5.7-6.4%  Consistent with prediabetes  >or=6.5%  Consistent with diabetes    High levels of fetal hemoglobin interfere with the HbA1C  assay. Heterozygous hemoglobin variants (HbS, HgC, etc)do  not significantly interfere with this assay.   However, presence of multiple variants may affect accuracy.   "     Hemoglobin A1c   Date Value Ref Range Status   03/27/2025 8.3 (H) 4.0 - 5.6 % Final     Comment:     ADA Screening Guidelines:  5.7-6.4%  Consistent with prediabetes  >=6.5%  Consistent with diabetes    High levels of fetal hemoglobin interfere with the HbA1C  assay. Heterozygous hemoglobin variants (HbS, HgC, etc)do  not significantly interfere with this assay.   However, presence of multiple variants may affect accuracy.        Chemistry        Component Value Date/Time     (L) 05/08/2025 0557     (L) 03/19/2025 0228    K 3.3 (L) 05/08/2025 0557    K 3.6 03/19/2025 0228     05/08/2025 0557     03/19/2025 0228    CO2 21 (L) 05/08/2025 0557    CO2 19 (L) 03/19/2025 0228    BUN 52 (H) 05/08/2025 0557    CREATININE 3.1 (H) 05/08/2025 0557    GLU 92 05/08/2025 0557     (H) 03/19/2025 0228        Component Value Date/Time    CALCIUM 8.4 (L) 05/08/2025 0557    CALCIUM 8.0 (L) 03/19/2025 0228    ALKPHOS 52 04/29/2025 0758    ALKPHOS 42 03/19/2025 0228    AST 14 04/29/2025 0758    AST 11 03/19/2025 0228    ALT <5 (L) 04/29/2025 0758    ALT <5 (L) 03/19/2025 0228    BILITOT 0.5 04/29/2025 0758    BILITOT 0.4 03/19/2025 0228    ESTGFRAFRICA 51.9 (A) 07/19/2022 0723    EGFRNONAA 44.9 (A) 07/19/2022 0723           Lab Results   Component Value Date    TSH 0.999 03/17/2025      Lab Results   Component Value Date    CHOL 172 12/07/2024    CHOL 112 (L) 07/01/2024    CHOL 131 06/07/2023     Lab Results   Component Value Date    HDL 30 (L) 12/07/2024    HDL 26 (L) 07/01/2024    HDL 31 (L) 06/07/2023     Lab Results   Component Value Date    LDLCALC 105.4 12/07/2024    LDLCALC 48.6 (L) 07/01/2024    LDLCALC 61.4 (L) 06/07/2023     Lab Results   Component Value Date    TRIG 183 (H) 12/07/2024    TRIG 187 (H) 07/01/2024    TRIG 193 (H) 06/07/2023     Lab Results   Component Value Date    CHOLHDL 17.4 (L) 12/07/2024    CHOLHDL 23.2 07/01/2024    CHOLHDL 23.7 06/07/2023       PHYSICAL  "EXAMINATION  Constitutional: Appears well, no distress. Reviewed vitals above.  Eyes: conjunctivae & lids intact; PERRLA, EOMs intact; optic discs   Neck: Supple, trachea midline.   Respiratory: no wheezes, even and unlabored  Cardiovascular: RRR; s1s2 , +pedal edema  Lymph: deferred   Skin: warm and dry; no injection site reactions, no acanthosis nigracans observed.  Neuro:patient alert and cooperative, normal affect; steady gait.  Psychiatric: judgement & insight intact, orientation of time, place & person intact, memory; mood & affect wnl     Diabetes Foot Exam:   Deferred     Assessment/Plan  1. Type 2 diabetes mellitus with stage 4 chronic kidney disease, with long-term current use of insulin  Hemoglobin A1C next time  No changes w/ settings  Doing very well   F/u with KTS   Stable   A1c goal less than 7%  Add A1c to 6/3/25 lab       2. History of CVA (cerebrovascular accident)  Avoid hypoglycemia  Stable       3. Diabetic polyneuropathy associated with type 2 diabetes mellitus  F/u with podiatry  Stable       4. Claudication of both lower extremities  F/u with vascular   Stable       5. CKD IV (severe)  F/u with nephrology   On eplerenone   Cardura , jardiance , clonidine patch      6. Chronic combined systolic (congestive) and diastolic (congestive) heart failure  F/u with cards   Stable         7. Mixed hyperlipidemia  No results found for: "LDL"  On statin  Goal < 70          "

## 2025-05-15 ENCOUNTER — OFFICE VISIT (OUTPATIENT)
Dept: INTERNAL MEDICINE | Facility: CLINIC | Age: 70
End: 2025-05-15
Payer: MEDICARE

## 2025-05-15 VITALS
SYSTOLIC BLOOD PRESSURE: 152 MMHG | WEIGHT: 210.13 LBS | HEART RATE: 78 BPM | HEIGHT: 73 IN | OXYGEN SATURATION: 99 % | DIASTOLIC BLOOD PRESSURE: 84 MMHG | BODY MASS INDEX: 27.85 KG/M2

## 2025-05-15 DIAGNOSIS — Z79.4 TYPE 2 DIABETES MELLITUS WITH STAGE 4 CHRONIC KIDNEY DISEASE, WITH LONG-TERM CURRENT USE OF INSULIN: Primary | ICD-10-CM

## 2025-05-15 DIAGNOSIS — Z86.73 HISTORY OF CVA (CEREBROVASCULAR ACCIDENT): ICD-10-CM

## 2025-05-15 DIAGNOSIS — N18.4 CHRONIC KIDNEY DISEASE (CKD), STAGE IV (SEVERE): ICD-10-CM

## 2025-05-15 DIAGNOSIS — E78.2 MIXED HYPERLIPIDEMIA: ICD-10-CM

## 2025-05-15 DIAGNOSIS — N18.4 TYPE 2 DIABETES MELLITUS WITH STAGE 4 CHRONIC KIDNEY DISEASE, WITH LONG-TERM CURRENT USE OF INSULIN: Primary | ICD-10-CM

## 2025-05-15 DIAGNOSIS — E11.22 TYPE 2 DIABETES MELLITUS WITH STAGE 4 CHRONIC KIDNEY DISEASE, WITH LONG-TERM CURRENT USE OF INSULIN: Primary | ICD-10-CM

## 2025-05-15 DIAGNOSIS — E11.42 DIABETIC POLYNEUROPATHY ASSOCIATED WITH TYPE 2 DIABETES MELLITUS: ICD-10-CM

## 2025-05-15 DIAGNOSIS — I50.42 CHRONIC COMBINED SYSTOLIC (CONGESTIVE) AND DIASTOLIC (CONGESTIVE) HEART FAILURE: ICD-10-CM

## 2025-05-15 DIAGNOSIS — I73.9 CLAUDICATION OF BOTH LOWER EXTREMITIES: ICD-10-CM

## 2025-05-15 PROCEDURE — 1126F AMNT PAIN NOTED NONE PRSNT: CPT | Mod: CPTII,HCNC,S$GLB, | Performed by: NURSE PRACTITIONER

## 2025-05-15 PROCEDURE — 4010F ACE/ARB THERAPY RXD/TAKEN: CPT | Mod: CPTII,HCNC,S$GLB, | Performed by: NURSE PRACTITIONER

## 2025-05-15 PROCEDURE — 99999 PR PBB SHADOW E&M-EST. PATIENT-LVL V: CPT | Mod: PBBFAC,HCNC,, | Performed by: NURSE PRACTITIONER

## 2025-05-15 PROCEDURE — 1160F RVW MEDS BY RX/DR IN RCRD: CPT | Mod: CPTII,HCNC,S$GLB, | Performed by: NURSE PRACTITIONER

## 2025-05-15 PROCEDURE — 95251 CONT GLUC MNTR ANALYSIS I&R: CPT | Mod: HCNC,S$GLB,, | Performed by: NURSE PRACTITIONER

## 2025-05-15 PROCEDURE — 99214 OFFICE O/P EST MOD 30 MIN: CPT | Mod: HCNC,S$GLB,, | Performed by: NURSE PRACTITIONER

## 2025-05-15 PROCEDURE — 3008F BODY MASS INDEX DOCD: CPT | Mod: CPTII,HCNC,S$GLB, | Performed by: NURSE PRACTITIONER

## 2025-05-15 PROCEDURE — 1159F MED LIST DOCD IN RCRD: CPT | Mod: CPTII,HCNC,S$GLB, | Performed by: NURSE PRACTITIONER

## 2025-05-15 PROCEDURE — 3288F FALL RISK ASSESSMENT DOCD: CPT | Mod: CPTII,HCNC,S$GLB, | Performed by: NURSE PRACTITIONER

## 2025-05-15 PROCEDURE — 3077F SYST BP >= 140 MM HG: CPT | Mod: CPTII,HCNC,S$GLB, | Performed by: NURSE PRACTITIONER

## 2025-05-15 PROCEDURE — 1101F PT FALLS ASSESS-DOCD LE1/YR: CPT | Mod: CPTII,HCNC,S$GLB, | Performed by: NURSE PRACTITIONER

## 2025-05-15 PROCEDURE — 1111F DSCHRG MED/CURRENT MED MERGE: CPT | Mod: CPTII,HCNC,S$GLB, | Performed by: NURSE PRACTITIONER

## 2025-05-15 PROCEDURE — 3079F DIAST BP 80-89 MM HG: CPT | Mod: CPTII,HCNC,S$GLB, | Performed by: NURSE PRACTITIONER

## 2025-05-15 PROCEDURE — 3052F HG A1C>EQUAL 8.0%<EQUAL 9.0%: CPT | Mod: CPTII,HCNC,S$GLB, | Performed by: NURSE PRACTITIONER

## 2025-05-15 NOTE — PATIENT INSTRUCTIONS
Follow up in 4 months w/Irielle  A1c 6/3/25 lab  A1c in 4 months     Lab Results   Component Value Date    HGBA1C 8.3 (H) 03/27/2025     Goal less than 7.5%     [email or web address]  Eat fit marion  [employer] marion  [email or web address]    [email or web address] marion    Omnipod 5   Novolog vials

## 2025-05-19 ENCOUNTER — TELEPHONE (OUTPATIENT)
Dept: INTERNAL MEDICINE | Facility: CLINIC | Age: 70
End: 2025-05-19
Payer: MEDICARE

## 2025-05-20 ENCOUNTER — CLINICAL SUPPORT (OUTPATIENT)
Dept: NEPHROLOGY | Facility: CLINIC | Age: 70
End: 2025-05-20
Payer: MEDICARE

## 2025-05-20 ENCOUNTER — OFFICE VISIT (OUTPATIENT)
Dept: INTERNAL MEDICINE | Facility: CLINIC | Age: 70
End: 2025-05-20
Payer: MEDICARE

## 2025-05-20 VITALS
WEIGHT: 212.31 LBS | DIASTOLIC BLOOD PRESSURE: 80 MMHG | OXYGEN SATURATION: 98 % | SYSTOLIC BLOOD PRESSURE: 142 MMHG | BODY MASS INDEX: 28.01 KG/M2 | HEART RATE: 63 BPM

## 2025-05-20 VITALS
BODY MASS INDEX: 28.15 KG/M2 | SYSTOLIC BLOOD PRESSURE: 144 MMHG | DIASTOLIC BLOOD PRESSURE: 74 MMHG | WEIGHT: 212.44 LBS | OXYGEN SATURATION: 98 % | HEART RATE: 88 BPM | HEIGHT: 73 IN

## 2025-05-20 DIAGNOSIS — D63.8 ANEMIA OF CHRONIC DISEASE: Primary | ICD-10-CM

## 2025-05-20 DIAGNOSIS — I48.19 PERSISTENT ATRIAL FIBRILLATION: ICD-10-CM

## 2025-05-20 DIAGNOSIS — I50.42 CHRONIC COMBINED SYSTOLIC (CONGESTIVE) AND DIASTOLIC (CONGESTIVE) HEART FAILURE: Primary | ICD-10-CM

## 2025-05-20 DIAGNOSIS — N18.4 TYPE 2 DIABETES MELLITUS WITH STAGE 4 CHRONIC KIDNEY DISEASE, WITH LONG-TERM CURRENT USE OF INSULIN: ICD-10-CM

## 2025-05-20 DIAGNOSIS — N18.4 CKD (CHRONIC KIDNEY DISEASE) STAGE 4, GFR 15-29 ML/MIN: ICD-10-CM

## 2025-05-20 DIAGNOSIS — J15.9 COMMUNITY ACQUIRED BACTERIAL PNEUMONIA: ICD-10-CM

## 2025-05-20 DIAGNOSIS — Z79.4 TYPE 2 DIABETES MELLITUS WITH STAGE 4 CHRONIC KIDNEY DISEASE, WITH LONG-TERM CURRENT USE OF INSULIN: ICD-10-CM

## 2025-05-20 DIAGNOSIS — E11.22 TYPE 2 DIABETES MELLITUS WITH STAGE 4 CHRONIC KIDNEY DISEASE, WITH LONG-TERM CURRENT USE OF INSULIN: ICD-10-CM

## 2025-05-20 DIAGNOSIS — Z94.0 S/P KIDNEY TRANSPLANT: ICD-10-CM

## 2025-05-20 PROCEDURE — 99999 PR PBB SHADOW E&M-EST. PATIENT-LVL II: CPT | Mod: PBBFAC,HCNC,,

## 2025-05-20 PROCEDURE — 1160F RVW MEDS BY RX/DR IN RCRD: CPT | Mod: CPTII,HCNC,S$GLB, | Performed by: INTERNAL MEDICINE

## 2025-05-20 PROCEDURE — 1159F MED LIST DOCD IN RCRD: CPT | Mod: CPTII,HCNC,S$GLB, | Performed by: INTERNAL MEDICINE

## 2025-05-20 PROCEDURE — 3008F BODY MASS INDEX DOCD: CPT | Mod: CPTII,HCNC,S$GLB, | Performed by: INTERNAL MEDICINE

## 2025-05-20 PROCEDURE — 3077F SYST BP >= 140 MM HG: CPT | Mod: CPTII,HCNC,S$GLB, | Performed by: INTERNAL MEDICINE

## 2025-05-20 PROCEDURE — G2211 COMPLEX E/M VISIT ADD ON: HCPCS | Mod: HCNC,S$GLB,, | Performed by: INTERNAL MEDICINE

## 2025-05-20 PROCEDURE — 1126F AMNT PAIN NOTED NONE PRSNT: CPT | Mod: CPTII,HCNC,S$GLB, | Performed by: INTERNAL MEDICINE

## 2025-05-20 PROCEDURE — 1111F DSCHRG MED/CURRENT MED MERGE: CPT | Mod: CPTII,HCNC,S$GLB, | Performed by: INTERNAL MEDICINE

## 2025-05-20 PROCEDURE — 99999 PR PBB SHADOW E&M-EST. PATIENT-LVL III: CPT | Mod: PBBFAC,HCNC,, | Performed by: INTERNAL MEDICINE

## 2025-05-20 PROCEDURE — 4010F ACE/ARB THERAPY RXD/TAKEN: CPT | Mod: CPTII,HCNC,S$GLB, | Performed by: INTERNAL MEDICINE

## 2025-05-20 PROCEDURE — 96372 THER/PROPH/DIAG INJ SC/IM: CPT | Mod: HCNC,GC,S$GLB, | Performed by: STUDENT IN AN ORGANIZED HEALTH CARE EDUCATION/TRAINING PROGRAM

## 2025-05-20 PROCEDURE — 3052F HG A1C>EQUAL 8.0%<EQUAL 9.0%: CPT | Mod: CPTII,HCNC,S$GLB, | Performed by: INTERNAL MEDICINE

## 2025-05-20 PROCEDURE — 3288F FALL RISK ASSESSMENT DOCD: CPT | Mod: CPTII,HCNC,S$GLB, | Performed by: INTERNAL MEDICINE

## 2025-05-20 PROCEDURE — 99214 OFFICE O/P EST MOD 30 MIN: CPT | Mod: HCNC,S$GLB,, | Performed by: INTERNAL MEDICINE

## 2025-05-20 PROCEDURE — 1101F PT FALLS ASSESS-DOCD LE1/YR: CPT | Mod: CPTII,HCNC,S$GLB, | Performed by: INTERNAL MEDICINE

## 2025-05-20 PROCEDURE — 3078F DIAST BP <80 MM HG: CPT | Mod: CPTII,HCNC,S$GLB, | Performed by: INTERNAL MEDICINE

## 2025-05-20 NOTE — PROGRESS NOTES
"Subjective:       Patient ID: Ravi Zamorano is a 69 y.o. male.    Chief Complaint: Hospital Follow Up (Phemnou/Swelling of ankle )    HPI  69 y.o. male presents for a hospital follow up visit. I have reviewed discharge summary as well as all relevant laboratory and pathology results and imaging.     Patient was admitted 4/29/25 to 5/8/25 with fever and chills found to have RLL pneumonia. Recent pneumonia end of March as well.     RML GGO concerning for pneumonia possible hiatal hernia and possible anterior vertebral osteophyte near mid esophagus.  SLP consulted. MBSS and barium esophogram to assess for hiatal hernia (second episode of pneumonia since March 2025) and recurrent aspiration as a cause of repeat pneumonia.     FEES done; SLP recommended regular diet and thin liquids.     BP regimen was adjusted  Spironolactone chagned to eplerenone 50mg daily  Clonidine 0.1mg patch started  Doxazosin increased to 8mg  Torsemide 40mg in hosp but resume hctz instead at d/c    Cr was elevated and had to hold valsartan.     Small hiatal hernia    Segmental pressures and PVR waveforms suggest moderate peripheral arterial occlusive disease.     Resp panel, flu and blood cx were negative.     Hold cellcept due to pna, continue tacroliums    His home machine is giving inaccurate readings so not checking athome.    HFrEF  40% EF in feb 2025  Review of Systems   Constitutional:  Negative for fever.   Respiratory:  Negative for shortness of breath.    Cardiovascular:  Positive for leg swelling. Negative for chest pain.   Musculoskeletal: Negative.    Skin: Negative.        Objective:   BP (!) 144/74   Pulse 88   Ht 6' 1" (1.854 m)   Wt 96.3 kg (212 lb 6.6 oz)   SpO2 98%   BMI 28.02 kg/m²      Physical Exam  Constitutional:       General: He is not in acute distress.     Appearance: He is well-developed. He is not diaphoretic.   HENT:      Head: Normocephalic and atraumatic.   Cardiovascular:      Rate and Rhythm: Normal " rate. Rhythm irregular.      Heart sounds: No murmur heard.     Comments: 1+ bilateral edema lower ext  Pulmonary:      Effort: Pulmonary effort is normal. No respiratory distress.      Breath sounds: No wheezing or rales.   Skin:     General: Skin is warm and dry.   Neurological:      Mental Status: He is alert and oriented to person, place, and time.   Psychiatric:         Behavior: Behavior normal.         Assessment:       1. Chronic combined systolic (congestive) and diastolic (congestive) heart failure    2. Persistent atrial fibrillation    3. Community acquired bacterial pneumonia    4. S/P cadaveric kidney transplant 11/5/2016. ESRD secondary to HTN/DMII    5. Type 2 diabetes mellitus with stage 4 chronic kidney disease, with long-term current use of insulin    6. CKD (chronic kidney disease) stage 4, GFR 15-29 ml/min        Plan:       Chronic combined systolic (congestive) and diastolic (congestive) heart failure  Persistent atrial fibrillation  Community acquired bacterial pneumonia  S/P cadaveric kidney transplant 11/5/2016. ESRD secondary to HTN/DMII  Type 2 diabetes mellitus with stage 4 chronic kidney disease, with long-term current use of insulin  CKD (chronic kidney disease) stage 4, GFR 15-29 ml/min  Continue current regimen as above  Has completed abx and breathing well on RA  Encouraged to schedule follow up with nephrologist        Health Maintenance Due   Topic    Diabetic Eye Exam

## 2025-05-20 NOTE — PROGRESS NOTES
Hgb 8.7/ Hct 29.8     Retacrit 4000 units .Per Form # [id number]. Pt was educated on mechanism of action of medication and possible side effects. Handout was given also.All concerns and questions was addressed. Pt was ask to wait 15 min after administration ,tolerated well, no reactions noted. Pt was given 2 week appt.      GFR 21

## 2025-05-22 ENCOUNTER — HOSPITAL ENCOUNTER (INPATIENT)
Facility: HOSPITAL | Age: 70
LOS: 3 days | Discharge: HOME | DRG: 194 | End: 2025-05-25
Attending: EMERGENCY MEDICINE | Admitting: STUDENT IN AN ORGANIZED HEALTH CARE EDUCATION/TRAINING PROGRAM
Payer: MEDICARE

## 2025-05-22 DIAGNOSIS — N18.4 CHRONIC KIDNEY DISEASE (CKD), STAGE IV (SEVERE): ICD-10-CM

## 2025-05-22 DIAGNOSIS — R04.2 HEMOPTYSIS: ICD-10-CM

## 2025-05-22 DIAGNOSIS — Z13.6 SCREENING FOR CARDIOVASCULAR CONDITION: ICD-10-CM

## 2025-05-22 DIAGNOSIS — E83.9 CHRONIC KIDNEY DISEASE-MINERAL BONE DISORDER (CKD-MBD) WITH STAGE 3B CHRONIC KIDNEY DISEASE: ICD-10-CM

## 2025-05-22 DIAGNOSIS — J18.9 PNEUMONIA OF LEFT LOWER LOBE DUE TO INFECTIOUS ORGANISM: ICD-10-CM

## 2025-05-22 DIAGNOSIS — R06.02 SHORTNESS OF BREATH: ICD-10-CM

## 2025-05-22 DIAGNOSIS — I50.9 CHF (CONGESTIVE HEART FAILURE): ICD-10-CM

## 2025-05-22 DIAGNOSIS — Z29.89 PROPHYLACTIC IMMUNOTHERAPY: ICD-10-CM

## 2025-05-22 DIAGNOSIS — N18.4 TYPE 2 DIABETES MELLITUS WITH STAGE 4 CHRONIC KIDNEY DISEASE, WITH LONG-TERM CURRENT USE OF INSULIN: Primary | ICD-10-CM

## 2025-05-22 DIAGNOSIS — R06.02 SOB (SHORTNESS OF BREATH): ICD-10-CM

## 2025-05-22 DIAGNOSIS — N18.32 CHRONIC KIDNEY DISEASE-MINERAL BONE DISORDER (CKD-MBD) WITH STAGE 3B CHRONIC KIDNEY DISEASE: ICD-10-CM

## 2025-05-22 DIAGNOSIS — Z79.4 TYPE 2 DIABETES MELLITUS WITH STAGE 4 CHRONIC KIDNEY DISEASE, WITH LONG-TERM CURRENT USE OF INSULIN: Primary | ICD-10-CM

## 2025-05-22 DIAGNOSIS — R07.9 CHEST PAIN: ICD-10-CM

## 2025-05-22 DIAGNOSIS — J18.1 LEFT UPPER LOBE CONSOLIDATION: ICD-10-CM

## 2025-05-22 DIAGNOSIS — Z96.41 INSULIN PUMP IN PLACE: ICD-10-CM

## 2025-05-22 DIAGNOSIS — E11.22 TYPE 2 DIABETES MELLITUS WITH STAGE 4 CHRONIC KIDNEY DISEASE, WITH LONG-TERM CURRENT USE OF INSULIN: Primary | ICD-10-CM

## 2025-05-22 DIAGNOSIS — J90 PLEURAL EFFUSION: ICD-10-CM

## 2025-05-22 DIAGNOSIS — M89.9 CHRONIC KIDNEY DISEASE-MINERAL BONE DISORDER (CKD-MBD) WITH STAGE 3B CHRONIC KIDNEY DISEASE: ICD-10-CM

## 2025-05-22 PROBLEM — R79.89 ELEVATED BRAIN NATRIURETIC PEPTIDE (BNP) LEVEL: Status: ACTIVE | Noted: 2025-05-22

## 2025-05-22 LAB
ABSOLUTE EOSINOPHIL (OHS): 0.01 K/UL
ABSOLUTE MONOCYTE (OHS): 1.46 K/UL (ref 0.3–1)
ABSOLUTE NEUTROPHIL COUNT (OHS): 10.35 K/UL (ref 1.8–7.7)
ALBUMIN SERPL BCP-MCNC: 2.9 G/DL (ref 3.5–5.2)
ALP SERPL-CCNC: 54 UNIT/L (ref 40–150)
ALT SERPL W/O P-5'-P-CCNC: <5 UNIT/L (ref 10–44)
ANION GAP (OHS): 12 MMOL/L (ref 8–16)
AST SERPL-CCNC: 13 UNIT/L (ref 11–45)
BACTERIA #/AREA URNS AUTO: NORMAL /HPF
BASOPHILS # BLD AUTO: 0.04 K/UL
BASOPHILS NFR BLD AUTO: 0.3 %
BILIRUB SERPL-MCNC: 0.6 MG/DL (ref 0.1–1)
BILIRUB UR QL STRIP.AUTO: NEGATIVE
BIPAP: 0
BIPAP: 0
BNP SERPL-MCNC: 1534 PG/ML (ref 0–99)
BUN SERPL-MCNC: 48 MG/DL (ref 6–30)
BUN SERPL-MCNC: 55 MG/DL (ref 8–23)
CALCIUM SERPL-MCNC: 8.8 MG/DL (ref 8.7–10.5)
CHLORIDE SERPL-SCNC: 97 MMOL/L (ref 95–110)
CHLORIDE SERPL-SCNC: 98 MMOL/L (ref 95–110)
CLARITY UR: CLEAR
CO2 SERPL-SCNC: 22 MMOL/L (ref 23–29)
COLOR UR AUTO: ABNORMAL
CREAT SERPL-MCNC: 3 MG/DL (ref 0.5–1.4)
CREAT SERPL-MCNC: 3.3 MG/DL (ref 0.5–1.4)
ERYTHROCYTE [DISTWIDTH] IN BLOOD BY AUTOMATED COUNT: 19 % (ref 11.5–14.5)
FIO2: 21 %
FIO2: 21 %
FLUAV AG UPPER RESP QL IA.RAPID: NEGATIVE
FLUBV AG UPPER RESP QL IA.RAPID: NEGATIVE
GFR SERPLBLD CREATININE-BSD FMLA CKD-EPI: 22 ML/MIN/1.73/M2
GLUCOSE SERPL-MCNC: 80 MG/DL (ref 70–110)
GLUCOSE SERPL-MCNC: 85 MG/DL (ref 70–110)
GLUCOSE UR QL STRIP: ABNORMAL
HCT VFR BLD AUTO: 30.5 % (ref 40–54)
HCT VFR BLD CALC: 33 %PCV (ref 36–54)
HGB BLD-MCNC: 9 GM/DL (ref 14–18)
HGB UR QL STRIP: NEGATIVE
HOLD SPECIMEN: NORMAL
HYALINE CASTS UR QL AUTO: 0 /LPF (ref 0–1)
IMM GRANULOCYTES # BLD AUTO: 0.15 K/UL (ref 0–0.04)
IMM GRANULOCYTES NFR BLD AUTO: 1.2 % (ref 0–0.5)
KETONES UR QL STRIP: NEGATIVE
LDH SERPL L TO P-CCNC: 1.4 MMOL/L (ref 0.5–2.2)
LDH SERPL L TO P-CCNC: 2.3 MMOL/L
LEUKOCYTE ESTERASE UR QL STRIP: NEGATIVE
LIPASE SERPL-CCNC: 5 U/L (ref 4–60)
LYMPHOCYTES # BLD AUTO: 0.63 K/UL (ref 1–4.8)
MAGNESIUM SERPL-MCNC: 2.2 MG/DL (ref 1.6–2.6)
MCH RBC QN AUTO: 22.3 PG (ref 27–31)
MCHC RBC AUTO-ENTMCNC: 29.5 G/DL (ref 32–36)
MCV RBC AUTO: 76 FL (ref 82–98)
MICROSCOPIC COMMENT: NORMAL
NITRITE UR QL STRIP: NEGATIVE
NUCLEATED RBC (/100WBC) (OHS): 0 /100 WBC
OHS QRS DURATION: 112 MS
OHS QTC CALCULATION: 425 MS
PH UR STRIP: 7 [PH]
PHOSPHATE SERPL-MCNC: 3.9 MG/DL (ref 2.7–4.5)
PLATELET # BLD AUTO: 142 K/UL (ref 150–450)
PMV BLD AUTO: ABNORMAL FL
POC IONIZED CALCIUM: 1.16 MMOL/L (ref 1.06–1.42)
POC PERFORMED BY: NORMAL
POC PERFORMED BY: NORMAL
POC TCO2 (MEASURED): 23 MMOL/L (ref 23–29)
POC TEMPERATURE: 37 C
POC TEMPERATURE: 37 C
POCT GLUCOSE: 132 MG/DL (ref 70–110)
POCT GLUCOSE: 254 MG/DL (ref 70–110)
POTASSIUM BLD-SCNC: 4.1 MMOL/L (ref 3.5–5.1)
POTASSIUM SERPL-SCNC: 4.1 MMOL/L (ref 3.5–5.1)
PROCALCITONIN SERPL-MCNC: 1.82 NG/ML
PROT SERPL-MCNC: 6.1 GM/DL (ref 6–8.4)
PROT UR QL STRIP: ABNORMAL
RBC # BLD AUTO: 4.03 M/UL (ref 4.6–6.2)
RBC #/AREA URNS AUTO: 1 /HPF (ref 0–4)
RELATIVE EOSINOPHIL (OHS): 0.1 %
RELATIVE LYMPHOCYTE (OHS): 5 % (ref 18–48)
RELATIVE MONOCYTE (OHS): 11.6 % (ref 4–15)
RELATIVE NEUTROPHIL (OHS): 81.8 % (ref 38–73)
RSV A 5' UTR RNA NPH QL NAA+PROBE: NEGATIVE
SAMPLE: ABNORMAL
SARS-COV-2 RNA RESP QL NAA+PROBE: NEGATIVE
SODIUM BLD-SCNC: 133 MMOL/L (ref 136–145)
SODIUM SERPL-SCNC: 132 MMOL/L (ref 136–145)
SP GR UR STRIP: 1.01
SPECIMEN SOURCE: NORMAL
SPECIMEN SOURCE: NORMAL
TROPONIN I SERPL HS-MCNC: 88 NG/L
TROPONIN I SERPL HS-MCNC: 89 NG/L
UROBILINOGEN UR STRIP-ACNC: NEGATIVE EU/DL
WBC # BLD AUTO: 12.64 K/UL (ref 3.9–12.7)
WBC #/AREA URNS AUTO: <1 /HPF (ref 0–5)

## 2025-05-22 PROCEDURE — 25000003 PHARM REV CODE 250: Mod: HCNC

## 2025-05-22 PROCEDURE — 63600175 PHARM REV CODE 636 W HCPCS: Mod: HCNC

## 2025-05-22 PROCEDURE — 84100 ASSAY OF PHOSPHORUS: CPT | Mod: HCNC

## 2025-05-22 PROCEDURE — 83605 ASSAY OF LACTIC ACID: CPT | Mod: HCNC

## 2025-05-22 PROCEDURE — 99222 1ST HOSP IP/OBS MODERATE 55: CPT | Mod: HCNC,GC,, | Performed by: INTERNAL MEDICINE

## 2025-05-22 PROCEDURE — 85025 COMPLETE CBC W/AUTO DIFF WBC: CPT | Mod: HCNC

## 2025-05-22 PROCEDURE — 93010 ELECTROCARDIOGRAM REPORT: CPT | Mod: HCNC,,, | Performed by: INTERNAL MEDICINE

## 2025-05-22 PROCEDURE — 83880 ASSAY OF NATRIURETIC PEPTIDE: CPT | Mod: HCNC

## 2025-05-22 PROCEDURE — 80048 BASIC METABOLIC PNL TOTAL CA: CPT | Mod: HCNC,XB

## 2025-05-22 PROCEDURE — 27000207 HC ISOLATION: Mod: HCNC

## 2025-05-22 PROCEDURE — 11000001 HC ACUTE MED/SURG PRIVATE ROOM: Mod: HCNC

## 2025-05-22 PROCEDURE — 96375 TX/PRO/DX INJ NEW DRUG ADDON: CPT | Mod: HCNC

## 2025-05-22 PROCEDURE — 83735 ASSAY OF MAGNESIUM: CPT | Mod: HCNC

## 2025-05-22 PROCEDURE — 0241U SARS-COV2 (COVID) WITH FLU/RSV BY PCR: CPT | Mod: HCNC

## 2025-05-22 PROCEDURE — 99900035 HC TECH TIME PER 15 MIN (STAT): Mod: HCNC

## 2025-05-22 PROCEDURE — 99222 1ST HOSP IP/OBS MODERATE 55: CPT | Mod: HCNC,,, | Performed by: NURSE PRACTITIONER

## 2025-05-22 PROCEDURE — 96365 THER/PROPH/DIAG IV INF INIT: CPT | Mod: HCNC

## 2025-05-22 PROCEDURE — 84145 PROCALCITONIN (PCT): CPT | Mod: HCNC

## 2025-05-22 PROCEDURE — 84484 ASSAY OF TROPONIN QUANT: CPT | Mod: HCNC

## 2025-05-22 PROCEDURE — 93005 ELECTROCARDIOGRAM TRACING: CPT | Mod: HCNC

## 2025-05-22 PROCEDURE — 82803 BLOOD GASES ANY COMBINATION: CPT | Mod: HCNC

## 2025-05-22 PROCEDURE — 99285 EMERGENCY DEPT VISIT HI MDM: CPT | Mod: 25,HCNC

## 2025-05-22 PROCEDURE — 83690 ASSAY OF LIPASE: CPT | Mod: HCNC

## 2025-05-22 PROCEDURE — 87040 BLOOD CULTURE FOR BACTERIA: CPT | Mod: HCNC

## 2025-05-22 PROCEDURE — 21400001 HC TELEMETRY ROOM: Mod: HCNC

## 2025-05-22 PROCEDURE — 81001 URINALYSIS AUTO W/SCOPE: CPT | Mod: HCNC

## 2025-05-22 PROCEDURE — 82947 ASSAY GLUCOSE BLOOD QUANT: CPT | Mod: HCNC

## 2025-05-22 RX ORDER — CEFEPIME HYDROCHLORIDE 1 G/1
1 INJECTION, POWDER, FOR SOLUTION INTRAMUSCULAR; INTRAVENOUS
Status: COMPLETED | OUTPATIENT
Start: 2025-05-22 | End: 2025-05-22

## 2025-05-22 RX ORDER — NALOXONE HCL 0.4 MG/ML
0.02 VIAL (ML) INJECTION
Status: DISCONTINUED | OUTPATIENT
Start: 2025-05-22 | End: 2025-05-25 | Stop reason: HOSPADM

## 2025-05-22 RX ORDER — IBUPROFEN 200 MG
24 TABLET ORAL
Status: DISCONTINUED | OUTPATIENT
Start: 2025-05-22 | End: 2025-05-23

## 2025-05-22 RX ORDER — ATORVASTATIN CALCIUM 40 MG/1
80 TABLET, FILM COATED ORAL DAILY
Status: DISCONTINUED | OUTPATIENT
Start: 2025-05-22 | End: 2025-05-25 | Stop reason: HOSPADM

## 2025-05-22 RX ORDER — CLONIDINE 0.1 MG/24H
1 PATCH, EXTENDED RELEASE TRANSDERMAL
Status: DISCONTINUED | OUTPATIENT
Start: 2025-05-27 | End: 2025-05-22

## 2025-05-22 RX ORDER — INSULIN ASPART 100 [IU]/ML
0-20 INJECTION, SOLUTION INTRAVENOUS; SUBCUTANEOUS
Status: DISCONTINUED | OUTPATIENT
Start: 2025-05-22 | End: 2025-05-25 | Stop reason: HOSPADM

## 2025-05-22 RX ORDER — PREDNISONE 5 MG/1
5 TABLET ORAL DAILY
Status: DISCONTINUED | OUTPATIENT
Start: 2025-05-23 | End: 2025-05-25 | Stop reason: HOSPADM

## 2025-05-22 RX ORDER — GABAPENTIN 100 MG/1
100 CAPSULE ORAL 3 TIMES DAILY
Status: DISCONTINUED | OUTPATIENT
Start: 2025-05-23 | End: 2025-05-23

## 2025-05-22 RX ORDER — CEFEPIME HYDROCHLORIDE 1 G/1
1 INJECTION, POWDER, FOR SOLUTION INTRAMUSCULAR; INTRAVENOUS
Status: DISCONTINUED | OUTPATIENT
Start: 2025-05-23 | End: 2025-05-23

## 2025-05-22 RX ORDER — GABAPENTIN 300 MG/1
300 CAPSULE ORAL ONCE
Status: COMPLETED | OUTPATIENT
Start: 2025-05-22 | End: 2025-05-23

## 2025-05-22 RX ORDER — VANCOMYCIN 1.75 GRAM/500 ML IN 0.9 % SODIUM CHLORIDE INTRAVENOUS
20 ONCE
Status: COMPLETED | OUTPATIENT
Start: 2025-05-22 | End: 2025-05-22

## 2025-05-22 RX ORDER — IBUPROFEN 200 MG
16 TABLET ORAL
Status: DISCONTINUED | OUTPATIENT
Start: 2025-05-22 | End: 2025-05-25 | Stop reason: HOSPADM

## 2025-05-22 RX ORDER — GABAPENTIN 100 MG/1
200 CAPSULE ORAL NIGHTLY
Status: DISCONTINUED | OUTPATIENT
Start: 2025-05-23 | End: 2025-05-23

## 2025-05-22 RX ORDER — POLYETHYLENE GLYCOL 3350 17 G/17G
17 POWDER, FOR SOLUTION ORAL DAILY
Status: DISCONTINUED | OUTPATIENT
Start: 2025-05-23 | End: 2025-05-25 | Stop reason: HOSPADM

## 2025-05-22 RX ORDER — IBUPROFEN 200 MG
16 TABLET ORAL
Status: DISCONTINUED | OUTPATIENT
Start: 2025-05-22 | End: 2025-05-23

## 2025-05-22 RX ORDER — GLUCAGON 1 MG
1 KIT INJECTION
Status: DISCONTINUED | OUTPATIENT
Start: 2025-05-22 | End: 2025-05-25 | Stop reason: HOSPADM

## 2025-05-22 RX ORDER — CLONIDINE 0.1 MG/24H
1 PATCH, EXTENDED RELEASE TRANSDERMAL
Status: DISCONTINUED | OUTPATIENT
Start: 2025-05-22 | End: 2025-05-25 | Stop reason: HOSPADM

## 2025-05-22 RX ORDER — TACROLIMUS 1 MG/1
1 CAPSULE ORAL 2 TIMES DAILY
Status: DISCONTINUED | OUTPATIENT
Start: 2025-05-22 | End: 2025-05-25 | Stop reason: HOSPADM

## 2025-05-22 RX ORDER — DOXAZOSIN 2 MG/1
8 TABLET ORAL DAILY
Status: DISCONTINUED | OUTPATIENT
Start: 2025-05-22 | End: 2025-05-25 | Stop reason: HOSPADM

## 2025-05-22 RX ORDER — SODIUM CHLORIDE 0.9 % (FLUSH) 0.9 %
10 SYRINGE (ML) INJECTION EVERY 12 HOURS PRN
Status: DISCONTINUED | OUTPATIENT
Start: 2025-05-22 | End: 2025-05-25 | Stop reason: HOSPADM

## 2025-05-22 RX ORDER — INSULIN ASPART 100 [IU]/ML
0.6 INJECTION, SOLUTION INTRAVENOUS; SUBCUTANEOUS CONTINUOUS
Status: DISCONTINUED | OUTPATIENT
Start: 2025-05-22 | End: 2025-05-25 | Stop reason: HOSPADM

## 2025-05-22 RX ORDER — IBUPROFEN 200 MG
24 TABLET ORAL
Status: DISCONTINUED | OUTPATIENT
Start: 2025-05-22 | End: 2025-05-25 | Stop reason: HOSPADM

## 2025-05-22 RX ORDER — HYDRALAZINE HYDROCHLORIDE 25 MG/1
100 TABLET, FILM COATED ORAL EVERY 8 HOURS
Status: DISCONTINUED | OUTPATIENT
Start: 2025-05-22 | End: 2025-05-25 | Stop reason: HOSPADM

## 2025-05-22 RX ORDER — GLUCAGON 1 MG
1 KIT INJECTION
Status: DISCONTINUED | OUTPATIENT
Start: 2025-05-22 | End: 2025-05-23

## 2025-05-22 RX ORDER — EPLERENONE 25 MG/1
25 TABLET ORAL DAILY
Status: DISCONTINUED | OUTPATIENT
Start: 2025-05-22 | End: 2025-05-22

## 2025-05-22 RX ADMIN — RIVAROXABAN 15 MG: 10 TABLET, FILM COATED ORAL at 05:05

## 2025-05-22 RX ADMIN — TACROLIMUS 1 MG: 1 CAPSULE ORAL at 05:05

## 2025-05-22 RX ADMIN — CLONIDINE 1 PATCH: 0.1 PATCH TRANSDERMAL at 10:05

## 2025-05-22 RX ADMIN — HYDRALAZINE HYDROCHLORIDE 100 MG: 25 TABLET ORAL at 10:05

## 2025-05-22 RX ADMIN — CEFEPIME 1 G: 1 INJECTION, POWDER, FOR SOLUTION INTRAMUSCULAR; INTRAVENOUS at 12:05

## 2025-05-22 RX ADMIN — VANCOMYCIN HYDROCHLORIDE 1750 MG: 10 INJECTION, POWDER, LYOPHILIZED, FOR SOLUTION INTRAVENOUS at 12:05

## 2025-05-22 RX ADMIN — ATORVASTATIN CALCIUM 80 MG: 40 TABLET, FILM COATED ORAL at 05:05

## 2025-05-22 NOTE — ASSESSMENT & PLAN NOTE
Hyponatremia is likely due to Heart Failure and/or HCTZ use. The patient's most recent sodium results are listed below.  Recent Labs     05/22/25  1153   *     Plan  - monitor with daily cmp

## 2025-05-22 NOTE — ASSESSMENT & PLAN NOTE
Patient has Combined Systolic and Diastolic heart failure that is Chronic. On presentation their CHF was well compensated. Most recent BNP and echo results are listed below.  Recent Labs     05/22/25  1153   BNP 1,534*     Latest ECHO  Results for orders placed during the hospital encounter of 02/20/25    Echo    Interpretation Summary    Left Ventricle: There is mild concentric hypertrophy. There is low normal systolic function with a visually estimated ejection fraction of 50 - 55%. Grade III diastolic dysfunction.    Left Ventricle: The left ventricle is at the upper limits of normal in size. Mildly increased wall thickness. There is mild concentric hypertrophy. Regional wall motion abnormalities present. See diagram for wall motion findings. There is low normal systolic function with a visually estimated ejection fraction of 50 - 55%. Ejection fraction is approximately 53%. Grade III diastolic dysfunction.    Right Ventricle: Systolic function is borderline low despite the low TAPSE.    Left Atrium: Left atrium is moderately dilated.    Aortic Valve: The aortic valve is a trileaflet valve. There is mild aortic valve sclerosis. There is moderate annular calcification present. There is mild aortic regurgitation with a centrally directed jet.    Mitral Valve: There is mild regurgitation with a centrally directed jet.    Pericardium: There is a trivial posterior effusion and under the RA.    Tricuspid Valve: There is mild regurgitation.    Pulmonary Artery: The estimated pulmonary artery systolic pressure is 30 mmHg.    Current Heart Failure Medications  hydrALAZINE tablet 100 mg, Every 8 hours, Oral  empagliflozin (Jardiance) tablet 10 mg, Daily, Oral    BNP 1500, similar to prior admission    Plan  - Monitor strict I&Os and daily weights.    - Place on telemetry  - Low sodium diet  - Place on fluid restriction of 1.5 L.   - The patient's volume status is at their baseline  - will consider diuretics

## 2025-05-22 NOTE — ED TRIAGE NOTES
Ravi Zamorano, a 69 y.o. male presents to the ED w/ complaint of fever and worsening SOB.    Triage note:  Chief Complaint   Patient presents with    Multiple complaints      Fever, chills, body aches, coughing up blood, kidney transplant pt 2016     Review of patient's allergies indicates:   Allergen Reactions    Nifedipine Other (See Comments)     Gingival hyperplasia     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    CKD (chronic kidney disease) stage 4, GFR 15-29 ml/min 07/29/2014    Colon polyps 2014    Depression - situational 07/29/2014    Diabetes type 2 since 2000 07/29/2014    Diabetic neuropathy 07/29/2014    Encounter for blood transfusion     History of cardioversion 10/03/2019    History of hepatitis C, s/p successful Rx w/ SVR24 - 9/2017 07/29/2014    Genotype 1a, treatment naive 10/2014 liver biopsy - grade 1 / stage 1 Completed Harvoni w/ SVR    Hyperlipidemia 07/29/2014    Hypertension     Neuropathy     Organ transplant candidate 07/29/2014    Pancreatitis     S/P cadaveric kidney transplant 11/5/2016. ESRD secondary to HTN/DMII 11/05/2016    Type 2 diabetes mellitus with stage 3a chronic kidney disease, with long-term current use of insulin 07/29/2014

## 2025-05-22 NOTE — ASSESSMENT & PLAN NOTE
Has hx of recurrent pneumonia with multiple hospitalizations, most recently discharged 5/10/25 for RLL pneumonia. Presented to [facility] ED with cough, subjective fevers/chills, hemoptysis, and new SUAD consolidation seen on Xray. Initial labs with borderline leukocytosis (12.6k), Na 132. No recent respiratory cultures. Negative resp infection panels in March and early May 2025. Legionella antigen negative 5/2/25.     CT chest 5/1/25 with bilateral multilobal consolidations and GGO associated with bronchial thickening suggestive of infectious/inflammatory process. Pulmonology had been consulted during recent hospitalization and recommend SLP with esophogram given hiatal hernia and GERD symptoms. SLP evaluated with Endoscopic Evaluation of a Swallow (FEES). Recommended regular diet/thin liquids and deemed no further ST warranted. Esophogram/MBSS not completed at that time.      Differentials include CAP, MAC, aspiration PNA, aspiration pneumonitis, TB, PJP. Lower suspicion for PE or infarction (given anticoagulated).     Plan:  -- TB r/o given immunosuppressed and recurrent pna   -- continue vanc/cefepime given recent hospitalization  -- f/u CT Chest non-contrast   -- f/u respiratory gram stain and culture  -- f/u blood cultures

## 2025-05-22 NOTE — ED NOTES
Telemetry Verification   Patient placed on Telemetry Box  Verified on ED monitor  Box [id number]   Monitor Tech Inae    Rate 64   Rhythm  NSR

## 2025-05-22 NOTE — ASSESSMENT & PLAN NOTE
At time of evaluation, pt meets criteria to continue home insulin pump usage.  - Has all adequate supplies   - Insulin pump site change on 05.20.2025.    - Bolus settings reviewed    - No changes to home regimen.   - Nurse to check BG qac/hs/0200 & record in epic   - Patient to input glucose into pump and use bolus wizard for prandial needs   - Will continue to monitor accuchecks and titrate insulin as clinically indicated .     - Discussed above plan with patient, patient verbalized understanding.   - Understands in case of pump malfunction or cognitive decline in which pt can no longer safely use insulin pump, will transition to [place] MDI        If pump malfunctions or is disconnected, PLEASE CALL ENDOCRINE ON-CALL.

## 2025-05-22 NOTE — ASSESSMENT & PLAN NOTE
KTM consulted. Unclear baseline. Cr 2.5 3 weeks ago, now 3.0    - avoid nephrotoxic agents  - follow up ktm recs

## 2025-05-22 NOTE — ED PROVIDER NOTES
Encounter Date: 5/22/2025       History     Chief Complaint   Patient presents with    Multiple complaints      Fever, chills, body aches, coughing up blood, kidney transplant pt 2016     Patient is a 69-year-old male with a past medical history of status post kidney transplant 2016, diabetes, CKD, diabetic polyneuropathy, HLD, CHF, AFib (on Xarelto) who arrives for evaluation of back pain, fever, hemoptysis.  Patient states having fever, chills, coughing up some blood that started this morning at around 4:00 a.m..  Patient was awoken by lower back pain this morning.  States that pain does not radiate, aching pain that lasted a few hours and has resided after taken some Tylenol.  Denies any dysuria saddle anesthesia, weakness, numbness, tingling.  Patient states having prior episodes of hemoptysis during previous episodes of pneumonia; states that hemoptysis is less than a tsp amount patient denies having any chest pain, shortness of breath, upper back pain, abdominal pain, groin pain.  Denies any new leg swelling.        Review of patient's allergies indicates:   Allergen Reactions    Nifedipine Other (See Comments)     Gingival hyperplasia     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    CKD (chronic kidney disease) stage 4, GFR 15-29 ml/min 07/29/2014    Colon polyps 2014    Depression - situational 07/29/2014    Diabetes type 2 since 2000 07/29/2014    Diabetic neuropathy 07/29/2014    Encounter for blood transfusion     History of cardioversion 10/03/2019    History of hepatitis C, s/p successful Rx w/ SVR24 - 9/2017 07/29/2014    Genotype 1a, treatment naive 10/2014 liver biopsy - grade 1 / stage 1 Completed Harvoni w/ SVR    Hyperlipidemia 07/29/2014    Hypertension     Neuropathy     Organ transplant candidate 07/29/2014    Pancreatitis     S/P  cadaveric kidney transplant 11/5/2016. ESRD secondary to HTN/DMII 11/05/2016    Type 2 diabetes mellitus with stage 3a chronic kidney disease, with long-term current use of insulin 07/29/2014     Past Surgical History:   Procedure Laterality Date    ABLATION OF ARRHYTHMOGENIC FOCUS FOR ATRIAL FIBRILLATION N/A 6/11/2024    Procedure: Ablation atrial fibrillation;  Surgeon: Bronson Bowden MD;  Location: [facility] EP LAB;  Service: Cardiology;  Laterality: N/A;  AF, FELICIANO (cx if SR), PVI, RFA, Carto, Gen, GP, 3 Prep    ABLATION, ATRIAL FLUTTER, TYPICAL  6/11/2024    Procedure: Ablation, Atrial Flutter, Typical;  Surgeon: Bronson Bowden MD;  Location: [facility] EP LAB;  Service: Cardiology;;    APPENDECTOMY      BONE MARROW BIOPSY Left 6/26/2024    Procedure: Biopsy-bone marrow;  Surgeon: Bridger Zapata MD;  Location: [facility] ENDO (4TH FLR);  Service: Oncology;  Laterality: Left;  6/24-pt knows to hold aspirin and eliquis as instructed by hem/onc, precall complete-Kpvt    CARDIOVERSION  6/11/2024    Procedure: Cardioversion;  Surgeon: Bronson Bowden MD;  Location: [facility] EP LAB;  Service: Cardiology;;    CATARACT EXTRACTION W/  INTRAOCULAR LENS IMPLANT Right 3/13/2024    Procedure: EXTRACTION, CATARACT, WITH IOL INSERTION;  Surgeon: Jennifer Palacio MD;  Location: [facility] OR;  Service: Ophthalmology;  Laterality: Right;    CATARACT EXTRACTION W/  INTRAOCULAR LENS IMPLANT Left 4/10/2024    Procedure: EXTRACTION, CATARACT, WITH IOL INSERTION;  Surgeon: Jennifer Palacio MD;  Location: [facility] OR;  Service: Ophthalmology;  Laterality: Left;    COLONOSCOPY N/A 12/21/2020    Procedure: COLONOSCOPY;  Surgeon: Tico Bell MD;  Location: [facility] ENDO (4TH FLR);  Service: Endoscopy;  Laterality: N/A;  ok to hold eliquis per Dr. Valadez, see telephone encounter 11/13/2020-MS  covid test 12/18 [employer]    ECHOCARDIOGRAM,TRANSESOPHAGEAL N/A 2/3/2025    Procedure: Transesophageal echo (FELICIANO) intra-procedure log documentation;  Surgeon:  Grayson Lin III, MD;  Location: [facility] EP LAB;  Service: Cardiology;  Laterality: N/A;    KIDNEY TRANSPLANT      TRANSESOPHAGEAL ECHOCARDIOGRAPHY N/A 8/26/2019    Procedure: ECHOCARDIOGRAM, TRANSESOPHAGEAL;  Surgeon: Dolores Diagnostic Provider;  Location: [facility] EP LAB;  Service: Cardiology;  Laterality: N/A;    TRANSESOPHAGEAL ECHOCARDIOGRAPHY N/A 6/11/2024    Procedure: ECHOCARDIOGRAM, TRANSESOPHAGEAL;  Surgeon: Grayson Lin III, MD;  Location: [facility] EP LAB;  Service: Cardiology;  Laterality: N/A;    TREATMENT OF CARDIAC ARRHYTHMIA N/A 8/26/2019    Procedure: CARDIOVERSION;  Surgeon: Bronson Bowden MD;  Location: [facility] EP LAB;  Service: Cardiology;  Laterality: N/A;  AF, FELICIANO, DCCV, MAC, GP, 3 PREP    TREATMENT OF CARDIAC ARRHYTHMIA N/A 2/3/2025    Procedure: Cardioversion or Defibrillation;  Surgeon: Bronson Bowden MD;  Location: [facility] EP LAB;  Service: Cardiology;  Laterality: N/A;  AF, FELICIANO, DCCV, MAC, GP, 3 Prep     Family History   Problem Relation Name Age of Onset    Diabetes Mother      Hypertension Mother      Heart disease Mother          CAD with PCI    Heart disease Father      Cancer Brother 2         one sister with breast cancer    Hypertension Brother 2         one sister with HTN and borderline DM    Stroke Neg Hx      Kidney disease Neg Hx      Colon cancer Neg Hx      Esophageal cancer Neg Hx       Social History[1]  Review of Systems    Physical Exam     Initial Vitals   BP Pulse Resp Temp SpO2   05/22/25 0939 05/22/25 0939 05/22/25 0939 05/22/25 0940 05/22/25 0939   137/63 80 16 98.5 °F (36.9 °C) 97 %      MAP       --                Physical Exam    Nursing note and vitals reviewed.  Constitutional: He appears well-developed and well-nourished. He is not diaphoretic. No distress.   HENT:   Head: Normocephalic and atraumatic.   Right Ear: External ear normal.   Left Ear: External ear normal.   Nose: Nose normal.   Eyes: Conjunctivae are normal. Right eye exhibits no discharge. Left eye  exhibits no discharge. No scleral icterus.   Cardiovascular:  Normal rate, regular rhythm and normal heart sounds.           Radial pulses 2+ bilaterally.   Pulmonary/Chest: No respiratory distress. He has no wheezes. He has no rhonchi. He has rales.   Bilateral lower lung crackles.  No midline spinal tenderness.  No paraspinal tenderness.   Abdominal: Abdomen is soft. He exhibits no distension. There is no abdominal tenderness.   Musculoskeletal:         General: No tenderness or edema. Normal range of motion.     Neurological: He is alert and oriented to person, place, and time.   Skin: Skin is warm and dry. Capillary refill takes less than 2 seconds. No rash and no abscess noted. No erythema. No pallor.   Psychiatric: He has a normal mood and affect.         ED Course   Procedures  Labs Reviewed   COMPREHENSIVE METABOLIC PANEL - Abnormal       Result Value    Sodium 132 (*)     Potassium 4.1      Chloride 98      CO2 22 (*)     Glucose 80      BUN 55 (*)     Creatinine 3.0 (*)     Calcium 8.8      Protein Total 6.1      Albumin 2.9 (*)     Bilirubin Total 0.6      ALP 54      AST 13      ALT <5 (*)     Anion Gap 12      eGFR 22 (*)    URINALYSIS, REFLEX TO URINE CULTURE - Abnormal    Color, UA Straw      Appearance, UA Clear      pH, UA 7.0      Spec Grav UA 1.010      Protein, UA 1+ (*)     Glucose, UA Trace (*)     Ketones, UA Negative      Bilirubin, UA Negative      Blood, UA Negative      Nitrites, UA Negative      Urobilinogen, UA Negative      Leukocyte Esterase, UA Negative     PROCALCITONIN - Abnormal    Procalcitonin 1.82 (*)    B-TYPE NATRIURETIC PEPTIDE - Abnormal    BNP 1,534 (*)    TROPONIN I HIGH SENSITIVITY - Abnormal    Troponin High Sensitive 89 (*)    CBC WITH DIFFERENTIAL - Abnormal    WBC 12.64      RBC 4.03 (*)     HGB 9.0 (*)     HCT 30.5 (*)     MCV 76 (*)     MCH 22.3 (*)     MCHC 29.5 (*)     RDW 19.0 (*)     Platelet Count 142 (*)     MPV        Nucleated RBC 0      Neut % 81.8 (*)      Lymph % 5.0 (*)     Mono % 11.6      Eos % 0.1      Basophil % 0.3      Imm Grans % 1.2 (*)     Neut # 10.35 (*)     Lymph # 0.63 (*)     Mono # 1.46 (*)     Eos # 0.01      Baso # 0.04      Imm Grans # 0.15 (*)    TROPONIN I HIGH SENSITIVITY - Abnormal    Troponin High Sensitive 88 (*)    ISTAT PROCEDURE - Abnormal    POC Glucose 85      POC BUN 48 (*)     POC Creatinine 3.3 (*)     POC Sodium 133 (*)     POC Potassium 4.1      POC Chloride 97      POC TCO2 (MEASURED) 23      POC Ionized Calcium 1.16      POC Hematocrit 33 (*)     Sample HANG     POCT GLUCOSE - Abnormal    POCT Glucose 132 (*)    LIPASE - Normal    Lipase Level 5     PHOSPHORUS - Normal    Phosphorus Level 3.9     MAGNESIUM - Normal    Magnesium  2.2     SARS-COV2 (COVID) WITH FLU/RSV BY PCR - Normal    INFLUENZA A BY PCR Negative      INFLUENZA B BY PCR Negative      Respiratory Syncytial Virus PCR Negative      SARS-CoV-2 PCR Negative     CULTURE, RESPIRATORY   CBC W/ AUTO DIFFERENTIAL    Narrative:     The following orders were created for panel order CBC auto differential.  Procedure                               Abnormality         Status                     ---------                               -----------         ------                     CBC with Differential[[id number]]       Abnormal            Final result                 Please view results for these tests on the individual orders.   GREY TOP URINE HOLD    Extra Tube Hold for add-ons.     URINALYSIS MICROSCOPIC    RBC, UA 1      WBC, UA <1      Bacteria, UA Rare      Hyaline Casts, UA 0      Microscopic Comment       QUANTIFERON GOLD TB    Narrative:     The following orders were created for panel order Quantiferon Gold TB.  Procedure                               Abnormality         Status                     ---------                               -----------         ------                     Quantiferon Gold TB[[id number]]                             In process                  Quantiferon Gold TB - NIL[[id number]]                       In process                 Quantiferon Gold TB - T...[[id number]]                      In process                 Quantiferon Gold TB - T...[[id number]]                      In process                   Please view results for these tests on the individual orders.   QUANTIFERON GOLD TB - MITOGEN   QUANTIFERON GOLD TB - NIL   QUANTIFERON GOLD TB - TB1 AG   QUANTIFERON GOLD TB - TB2 AG   ISTAT CHEM8   POCT GLUCOSE MONITORING CONTINUOUS        ECG Results              EKG 12-lead (Final result)        Collection Time Result Time QRS Duration OHS QTC Calculation    05/22/25 09:33:06 05/22/25 10:29:04 112 425                     Final result by Interface, Lab In [facility] (05/22/25 10:29:12)                   Narrative:    Test Reason : R06.02,    Vent. Rate :  81 BPM     Atrial Rate : 267 BPM     P-R Int :    ms          QRS Dur : 112 ms      QT Int : 366 ms       P-R-T Axes :  90  53 191 degrees    QTcB Int : 425 ms    Atrial flutter with variable A-V block  Low voltage QRS in the limb leads  Abnormal R wave progression in the precordial leads  Nonspecific ST and T wave abnormality  Abnormal ECG  When compared with ECG of 03-May-2025 11:18,  Atrial flutter has replaced Atrial fibrillation  Confirmed by Jasen Becker ([id number]) on 5/22/2025 10:28:59 AM    Referred By: AAAREFERRAL SELF           Confirmed By: Jasen Becker                                  Imaging Results               CT Chest Without Contrast (Final result)  Result time 05/22/25 18:03:46      Final result by Ashly Orr MD (05/22/25 18:03:46)                   Impression:      New left upper lobe perihilar airspace disease when compared to 05/01/2025.  A similar finding in the right lung on the a previous exam is substantially improved.  The appearance is most consistent with multifocal pneumonia.    Bilateral pleural effusions right greater than left, improved when compared to  05/01/2025.    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    This report was flagged in Epic as abnormal.    Electronically signed by resident: Adalid Connor  Date:    05/22/2025  Time:    17:32    Electronically signed by: Ashly Orr  Date:    05/22/2025  Time:    18:03               Narrative:    EXAMINATION:  CT CHEST WITHOUT CONTRAST    CLINICAL HISTORY:  Hemoptysis;    TECHNIQUE:  Using low dose technique the chest was surveyed from above the pulmonary apices through the posterior costophrenic angles.  Data was reconstructed for multiplanar images in axial, sagittal and coronal planes and for maximal intensity projection images in the the axial, sagittal and coronal planes.    COMPARISON:  CT chest without 05/01/2025    FINDINGS:  Support tubes and lines: None.    Aorta: The ascending aorta measures up to 4.3 cm, unchanged when compared to 05/01/2025.    Heart: Mildly enlarged.    Coronary arteries: Severe multi-vessel coronary artery calcifications.    Pericardium: Normal. No effusion, thickening, or calcification.    Central pulmonary arteries: Enlarged, 3.6 cm, unchanged when compared to 05/01/2025.    Base of neck/thyroid: Unremarkable.    Lymph nodes: No supraclavicular, axillary, internal mammary, mediastinal, or hilar adenopathy.    Esophagus: Patulous esophagus.    Pleura: Bilateral pleural effusions right greater than left, improved when compared to 05/01/2025.    Airways: Unremarkable.    Lungs: Left upper lobe, central predominant, ground-glass attenuation with interspersed interlobar thickening and scattered dense areas of consolidation.  This appearance is also seen within the left lower, right upper and right middle lung zones.  The right lung airspace disease is significantly improved when compared to 05/01/2025.  The left lung airspace disease is new.  There are emphysematous changes of the bilateral lungs.  Bibasilar  atelectasis.    Upper abdomen: There is severe calcific atherosclerosis of the aorta and SMA.  There is calcific atherosclerosis of the celiac artery origin and the splenic artery.  Distended gallbladder without evidence of calcified cholelithiasis or cholecystitis.    Body wall: Unremarkable.    Bones: No acute fractures or dislocations.  Degenerative changes and spondylolysis of the visualized thoracic spine.                                       X-Ray Chest AP Portable (Final result)  Result time 05/22/25 11:41:14      Final result by Tico López MD (05/22/25 11:41:14)                   Impression:      Detrimental interval change in the appearance of the chest since 04/29/2025 is appreciated, as the current examination demonstrates interval development of some airspace consolidation in the left upper lobe.  Continued demonstration of cardiomegaly.  Airspace consolidation in the right lower lung zone seen on 04/29/2025 has resolved.      Electronically signed by: Tico López MD  Date:    05/22/2025  Time:    11:41               Narrative:    EXAMINATION:  XR CHEST AP PORTABLE    CLINICAL HISTORY:  Sepsis;    TECHNIQUE:  One view    COMPARISON:  Comparison is made to 04/29/2025.    FINDINGS:  The heart remains mildly enlarged, but the appearance of the cardiomediastinal silhouette demonstrates no detrimental change since the examination referenced above.  There has been a detrimental change in the appearance of the lung zones since that time, however, as opacity consistent with airspace consolidation has developed in the left upper lung zone.  Clinical correlation is necessary, as the findings may represent acute pneumonia.  The right lung and the mid and lower lung zones on the left side appear essentially clear, with note made of the fact that the airspace consolidation in the right lower lung zone present on the previous study has resolved in the interval.  No pleural fluid.  No pneumothorax.                                        Medications   polyethylene glycol packet 17 g (has no administration in time range)   rivaroxaban tablet 15 mg (15 mg Oral Given 5/22/25 1702)   hydrALAZINE tablet 100 mg (100 mg Oral Given 5/23/25 0533)   empagliflozin (Jardiance) tablet 10 mg (10 mg Oral Not Given 5/22/25 1654)   atorvastatin tablet 80 mg (80 mg Oral Given 5/22/25 1702)   doxazosin tablet 8 mg (8 mg Oral Not Given 5/22/25 1653)   sodium chloride 0.9% flush 10 mL (has no administration in time range)   naloxone 0.4 mg/mL injection 0.02 mg (has no administration in time range)   glucose chewable tablet 16 g (has no administration in time range)   glucose chewable tablet 24 g (has no administration in time range)   dextrose 50% injection 12.5 g (has no administration in time range)   dextrose 50% injection 25 g (has no administration in time range)   glucagon (human recombinant) injection 1 mg (has no administration in time range)   vancomycin - pharmacy to dose (has no administration in time range)   ceFEPIme injection 1 g (1 g Intravenous Given 5/23/25 0001)   glucose chewable tablet 16 g (has no administration in time range)   glucose chewable tablet 24 g (has no administration in time range)   dextrose 50% injection 12.5 g (has no administration in time range)   dextrose 50% injection 25 g (has no administration in time range)   glucagon (human recombinant) injection 1 mg (has no administration in time range)   insulin aspart U-100 insulin pump from home 0.6 Units (has no administration in time range)   insulin aspart U-100 insulin pump from home 0-20 Units (has no administration in time range)   vancomycin 750 mg in 0.9% NaCl 250 mL IVPB (admixture device) (750 mg Intravenous Trough Due As Scheduled Before Dose 5/24/25 0600)   tacrolimus capsule 1 mg (1 mg Oral Given 5/22/25 1744)   predniSONE tablet 5 mg (has no administration in time range)   cloNIDine 0.1 mg/24 hr td ptwk 1 patch (1 patch Transdermal Patch Applied 5/22/25  8546)   gabapentin capsule 100 mg (has no administration in time range)     And   gabapentin capsule 200 mg (has no administration in time range)   vancomycin 1750 mg in 0.9% sodium chloride 500 mL IVPB (0 mg Intravenous Stopped 5/22/25 1615)   ceFEPIme injection 1 g (1 g Intravenous Given 5/22/25 1204)   gabapentin capsule 300 mg (300 mg Oral Given 5/23/25 0001)     Medical Decision Making  Patient is a 69 year old male  with history as stated above.    Differential diagnosis includes, but is not limited to:    -pneumonia  -bronchitis  -PE  -UTI  -pyelonephritis (do not suspect pyelonephritis given that patient complains of low back pain that is distinct from location of    Management:     Ordered PE study to evaluate for PE due to patient's having episodes of hemoptysis and no previous studies to rule out PE.  Patient is bilateral lower lung crackles most likely secondary to pneumonia.  Patient treated with vancomycin and cefepime given history of immunosuppression.  Patient has elevated troponin and BNP; ordered a repeat of troponin.  Procalcitonin is elevated which increases suspicion for bacterial infection.  Mild hyponatremia.  On chest x-ray, findings concerning for consolidation of left upper lobe and improvement of prior consolidation from previous imaging.  Case discussed with hospital medicine who admitted patient to their service for further management and IV antibiotics.    Amount and/or Complexity of Data Reviewed  External Data Reviewed: radiology.     Details: === Results for orders placed during the hospital encounter of 02/20/25 ===    Echo    - Interpretation Summary -  ·  Left Ventricle: There is mild concentric hypertrophy. There is low normal systolic function with a visually estimated ejection fraction of 50 - 55%. Grade III diastolic dysfunction.  ·  Left Ventricle: The left ventricle is at the upper limits of normal in size. Mildly increased wall thickness. There is mild concentric  hypertrophy. Regional wall motion abnormalities present. See diagram for wall motion findings. There is low normal systolic function with a visually estimated ejection fraction of 50 - 55%. Ejection fraction is approximately 53%. Grade III diastolic dysfunction.  ·  Right Ventricle: Systolic function is borderline low despite the low TAPSE.  ·  Left Atrium: Left atrium is moderately dilated.  ·  Aortic Valve: The aortic valve is a trileaflet valve. There is mild aortic valve sclerosis. There is moderate annular calcification present. There is mild aortic regurgitation with a centrally directed jet.  ·  Mitral Valve: There is mild regurgitation with a centrally directed jet.  ·  Pericardium: There is a trivial posterior effusion and under the RA.  ·  Tricuspid Valve: There is mild regurgitation.  ·  Pulmonary Artery: The estimated pulmonary artery systolic pressure is 30 mmHg.     Labs: ordered. Decision-making details documented in ED Course.  Radiology: ordered. Decision-making details documented in ED Course.     Details: Portable chest x-ray  ECG/medicine tests: ordered.    Risk  Prescription drug management.  Decision regarding hospitalization.               ED Course as of 05/22/25 1504   Thu May 22, 2025   1042    1202 X-Ray Chest AP Portable  Consolidation left upper lobe [AG]   1223 POC Lactate: 2.3 [AG]   1256 Troponin I High Sensitivity(!): 89 [AG]   1257 BNP(!): 1,534 [AG]   1310 Explained risks and benefits of contrast administration for PE study.  Upon explanation, patient agreeing with receiving contrast dye for PE study. [AG]   1323 Patient is a 69-year-old male with past medical history status post kidney transplant 2016 on tacrolimus, Xarelto that is presenting for hemoptysis, fevers, chills, cough.  Patient recently discharged on 05/08/2025 for treatment of pneumonia.  Patient has had reoccurrence pneumonia and has been hospitalized recently several times.  Patient felt improved when being  discharged.  Patient then began to have symptoms last night of chills, subjective fevers, mild productive cough.  Mild hemoptysis.  Patient then came here for further evaluation.  Patient denies any chest pains, nausea, vomiting, abdominal pain.    Physical exam: Very well-appearing 69-year-old male, no distress, appropriately conversational.  Benign cardiac, respiratory, abdominal exam.  No obvious respiratory distress at this time.    Plan:  X-ray noted, we will admit patient for further evaluation, reoccurrence of pneumonia.  Discussed with hospital medicine    Attestation of Dr. Castellanos (Attending Physician):      I have reviewed the record and the patient care provided by the Resident provider and agree with the documented HPI, ROS, PE.  I also agree with the treatment plan and work up and I have performed an independent history / physical exam and MDM. [RT]   1328 Procalcitonin(!): 1.82 [AG]      ED Course User Index  [AG] Leona Merino MD  [RT] Shen Castellanos MD                           Clinical Impression:  Final diagnoses:  [R06.02] SOB (shortness of breath)  [Z13.6] Screening for cardiovascular condition          ED Disposition Condition    Admit                     Leona Merino MD  Resident  [id number]         [1]   Social History  Tobacco Use    Smoking status: Former     Current packs/day: 0.00     Average packs/day: 0.5 packs/day for 32.0 years (16.0 ttl pk-yrs)     Types: Cigarettes     Start date: 1984     Quit date: 2016     Years since quittin.4    Smokeless tobacco: Never   Substance Use Topics    Alcohol use: Yes     Comment: Pt reports occasional beers on Sundays. Pt reports drinking daily prior to ESRD diagnosis.    Drug use: No        Shen Castellanos MD  [id number]

## 2025-05-22 NOTE — ED NOTES
I-STAT Chem-8+ Results:   Value Reference Range   Sodium 133 136-145 mmol/L   Potassium  4.1 3.5-5.1 mmol/L   Chloride 97  mmol/L   Ionized Calcium 1.16 1.06-1.42 mmol/L   CO2 (measured) 23 23-29 mmol/L   Glucose 85  mg/dL   BUN 48 6-30 mg/dL   Creatinine 3.3 0.5-1.4 mg/dL   Hematocrit 33 36-54%

## 2025-05-22 NOTE — ASSESSMENT & PLAN NOTE
Has hx of recurrent pneumonia with multiple hospitalizations, most recently discharged 5/10/25 for RLL pneumonia. Presented to [facility] ED with cough, subjective fevers/chills, hemoptysis, and new SUAD consolidation seen on Xray. Initial labs with borderline leukocytosis (12.6k), Na 132. No recent respiratory cultures. Negative resp infection panels in March and early May 2025. Legionella antigen negative 5/2/25.     CT chest 5/1/25 with bilateral multilobal consolidations and GGO associated with bronchial thickening suggestive of infectious/inflammatory process. Pulmonology had been consulted during recent hospitalization and recommend SLP with esophogram given hiatal hernia and GERD symptoms. SLP evaluated with Endoscopic Evaluation of a Swallow (FEES). Recommended regular diet/thin liquids and deemed no further ST warranted. Esophogram/MBSS not completed at that time.      Differentials include CAP, MAC, aspiration PNA, aspiration pneumonitis, TB, PJP. Lower suspicion for PE or infarction (given anticoagulated).     Plan:  -- TB r/o given immunosuppressed and recurrent pna   -- continue vanc/cefepime given recent hospitalization  -- f/u CT Chest non-contrast   -- f/u respiratory gram stain and culture  -- f/u blood cultures

## 2025-05-22 NOTE — CONSULTS
Name: Ravi Zamorano  Medical Record Number: [id number]  Date of Service: 05/22/2025  Note By: Everett Zamora MD    RENAL TRANSPLANT INITIAL CONSULTATION NOTE    Reason for Consultation: Reassessment of kidney transplant recipient and management of immunosuppression.     ORGAN: RIGHT KIDNEY  Donor Type: donation after brain death  PHS Increased Risk: yes  Cold Ischemia: 314 mins  Induction Medications: thymoglobulin    History of Present Illness:  Ravi has ESRD presumed secondary to DM/HTN post  DDRT 11/5/16. his creatinines were in the 1.9-2 ranging late 2024, peaked at 3.0 2/20/25 and  recently have been 2.2-2.5. He presents with fever and noted to have RLL PNA 04/03/2025 .Sepsis protocol initiated and started on broad spectrum abx.Completed 7 day cefipime course on 5/5. At the time there was concern for aspiration pneumonia given his large hiatal hernia. FEES was performed but barium swallow was deferred.  evaluation of cough, fever, chills, mild hemoptysis, and lower back pain starting about 4am this morning. He ran 101F fever last night. Low back pain resolved with tylenol. His hemoptysis occurred 4 times this morning with about 1/2 teaspoon of blood each time   Home IS: tacrolimus 1/1,  mg bid ( Held since last hospital visit) and prednisone 5 mg    ?  Past Medical History:  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    CKD (chronic kidney disease) stage 4, GFR 15-29 ml/min 07/29/2014    Colon polyps 2014    Depression - situational 07/29/2014    Diabetes type 2 since 2000 07/29/2014    Diabetic neuropathy 07/29/2014    Encounter for blood transfusion     History of cardioversion 10/03/2019    History of hepatitis C, s/p successful Rx w/ SVR24 - 9/2017 07/29/2014    Genotype 1a, treatment naive 10/2014 liver biopsy - grade 1 / stage  1 Completed Harvoni w/ SVR    Hyperlipidemia 07/29/2014    Hypertension     Neuropathy     Organ transplant candidate 07/29/2014    Pancreatitis     S/P cadaveric kidney transplant 11/5/2016. ESRD secondary to HTN/DMII 11/05/2016    Type 2 diabetes mellitus with stage 3a chronic kidney disease, with long-term current use of insulin 07/29/2014       Past Surgical History:  Past Surgical History:   Procedure Laterality Date    ABLATION OF ARRHYTHMOGENIC FOCUS FOR ATRIAL FIBRILLATION N/A 6/11/2024    Procedure: Ablation atrial fibrillation;  Surgeon: Bronson Bowden MD;  Location: [facility] EP LAB;  Service: Cardiology;  Laterality: N/A;  AF, FELICIANO (cx if SR), PVI, RFA, Carto, Gen, GP, 3 Prep    ABLATION, ATRIAL FLUTTER, TYPICAL  6/11/2024    Procedure: Ablation, Atrial Flutter, Typical;  Surgeon: Bronson Bowden MD;  Location: [facility] EP LAB;  Service: Cardiology;;    APPENDECTOMY      BONE MARROW BIOPSY Left 6/26/2024    Procedure: Biopsy-bone marrow;  Surgeon: Bridger Zapata MD;  Location: [facility] ENDO (4TH FLR);  Service: Oncology;  Laterality: Left;  6/24-pt knows to hold aspirin and eliquis as instructed by hem/onc, precall complete-Kpvt    CARDIOVERSION  6/11/2024    Procedure: Cardioversion;  Surgeon: Bronson Bowden MD;  Location: [facility] EP LAB;  Service: Cardiology;;    CATARACT EXTRACTION W/  INTRAOCULAR LENS IMPLANT Right 3/13/2024    Procedure: EXTRACTION, CATARACT, WITH IOL INSERTION;  Surgeon: Jennifer Palacio MD;  Location: [facility] OR;  Service: Ophthalmology;  Laterality: Right;    CATARACT EXTRACTION W/  INTRAOCULAR LENS IMPLANT Left 4/10/2024    Procedure: EXTRACTION, CATARACT, WITH IOL INSERTION;  Surgeon: Jennifer Palacio MD;  Location: [facility] OR;  Service: Ophthalmology;  Laterality: Left;    COLONOSCOPY N/A 12/21/2020    Procedure: COLONOSCOPY;  Surgeon: Tico Bell MD;  Location: [facility] ENDO (4TH FLR);  Service: Endoscopy;  Laterality: N/A;  ok to hold eliquis per Dr. Valadez, see telephone encounter  11/13/2020-MS  covid test 12/18 [place]    ECHOCARDIOGRAM,TRANSESOPHAGEAL N/A 2/3/2025    Procedure: Transesophageal echo (FELICIANO) intra-procedure log documentation;  Surgeon: Grayson Lin III, MD;  Location: [facility] EP LAB;  Service: Cardiology;  Laterality: N/A;    KIDNEY TRANSPLANT      TRANSESOPHAGEAL ECHOCARDIOGRAPHY N/A 8/26/2019    Procedure: ECHOCARDIOGRAM, TRANSESOPHAGEAL;  Surgeon: [facility] Diagnostic Provider;  Location: [facility] EP LAB;  Service: Cardiology;  Laterality: N/A;    TRANSESOPHAGEAL ECHOCARDIOGRAPHY N/A 6/11/2024    Procedure: ECHOCARDIOGRAM, TRANSESOPHAGEAL;  Surgeon: Grayson Lin III, MD;  Location: [facility] EP LAB;  Service: Cardiology;  Laterality: N/A;    TREATMENT OF CARDIAC ARRHYTHMIA N/A 8/26/2019    Procedure: CARDIOVERSION;  Surgeon: Bronson Bowden MD;  Location: [facility] EP LAB;  Service: Cardiology;  Laterality: N/A;  AF, FELICIANO, DCCV, MAC, GP, 3 PREP    TREATMENT OF CARDIAC ARRHYTHMIA N/A 2/3/2025    Procedure: Cardioversion or Defibrillation;  Surgeon: Bronson Bowden MD;  Location: [facility] EP LAB;  Service: Cardiology;  Laterality: N/A;  AF, FELICIANO, DCCV, MAC, GP, 3 Prep       Family History:  Family History   Problem Relation Name Age of Onset    Diabetes Mother      Hypertension Mother      Heart disease Mother          CAD with PCI    Heart disease Father      Cancer Brother 2         one sister with breast cancer    Hypertension Brother 2         one sister with HTN and borderline DM    Stroke Neg Hx      Kidney disease Neg Hx      Colon cancer Neg Hx      Esophageal cancer Neg Hx         Social History:  Social History[1]    Home Medications:   Prescriptions Prior to Admission[2]    Inpatient Medications:  Scheduled Meds:   atorvastatin  80 mg Oral Daily    [START ON 5/23/2025] ceFEPime IV (PEDS and ADULTS)  1 g Intravenous Q12H    [START ON 5/27/2025] cloNIDine 0.1 mg/24 hr td ptwk  1 patch Transdermal Q7 Days    doxazosin  8 mg Oral Daily    empagliflozin  10 mg Oral Daily    epoetin  tanya  4,000 Units Subcutaneous Q7 Days    hydrALAZINE  100 mg Oral Q8H    [START ON 5/23/2025] polyethylene glycol  17 g Oral Daily    rivaroxaban  15 mg Oral Daily with dinner    tacrolimus  1 mg Oral BID    [START ON 5/23/2025] vancomycin (VANCOCIN) IV (PEDS and ADULTS)  750 mg Intravenous Q24H       Continuous Infusions:   insulin aspart U-100  0.6 Units Subcutaneous Continuous           PRN Meds:.  Current Facility-Administered Medications:     dextrose 50%, 12.5 g, Intravenous, PRN    dextrose 50%, 12.5 g, Intravenous, PRN    dextrose 50%, 25 g, Intravenous, PRN    dextrose 50%, 25 g, Intravenous, PRN    glucagon (human recombinant), 1 mg, Intramuscular, PRN    glucagon (human recombinant), 1 mg, Intramuscular, PRN    glucose, 16 g, Oral, PRN    glucose, 16 g, Oral, PRN    glucose, 24 g, Oral, PRN    glucose, 24 g, Oral, PRN    insulin aspart U-100, 0-20 Units, Subcutaneous, PRN    naloxone, 0.02 mg, Intravenous, PRN    sodium chloride 0.9%, 10 mL, Intravenous, Q12H PRN    Pharmacy to dose Vancomycin consult, , , Once **AND** [START ON 5/23/2025] vancomycin - pharmacy to dose, , Intravenous, pharmacy to manage frequency    Allergies:  Nifedipine    Review of Systems:  Reviewed. Pertinent positives and negatives included in HPI.    Physical Exam:  Vitals:  Vitals:    05/22/25 1700   BP: (!) 179/81   Pulse: 64   Resp:    Temp:      Temp:  [98.5 °F (36.9 °C)] 98.5 °F (36.9 °C)  Pulse:  [64-80] 64  Resp:  [16-20] 18  SpO2:  [97 %-99 %] 98 %  BP: (137-179)/(63-81) 179/81    Exam  General: alert, conversational, NAD  HEENT: moist mucus membranes  Neck: no JVD  Respiratory: decreased breath sounds pulmonary bases bilateral, Ronchi left lung  Cardiovacular: RRR, No murmur appreciated  Gastrointestinal: Soft, non-tender   Renal allograft exam: No tenderness, no bruit   Extremities: right lower bilateral  edema    Labs:  Lab Results   Component Value Date    WBC 12.64 05/22/2025    HGB 9.0 (L) 05/22/2025    HCT 33 (L)  "05/22/2025     (L) 05/22/2025    K 4.1 05/22/2025    CL 98 05/22/2025    CO2 22 (L) 05/22/2025    BUN 55 (H) 05/22/2025    CREATININE 3.0 (H) 05/22/2025    EGFRNONAA 44.9 (A) 07/19/2022    CALCIUM 8.8 05/22/2025    PHOS 3.9 05/22/2025    MG 2.2 05/22/2025    ALBUMIN 2.9 (L) 05/22/2025    AST 13 05/22/2025    ALT <5 (L) 05/22/2025       No results found for: "EXTANC", "EXTWBC", "EXTSEGS", "EXTPLATELETS", "EXTHEMOGLOBI", "EXTHEMATOCRI", "EXTCREATININ", "EXTSODIUM", "EXTPOTASSIUM", "EXTBUN", "EXTCO2", "EXTCALCIUM", "EXTPHOSPHORU", "EXTGLUCOSE", "EXTALBUMIN", "EXTAST", "EXTALT", "EXTBILITOTAL", "EXTLIPASE", "EXTAMYLASE"    No results found for: "EXTCYCLOSLVL", "EXTSIROLIMUS", "EXTTACROLVL", "EXTPROTCRE", "EXTPTHINTACT", "EXTPROTEINUA", "EXTWBCUA", "EXTRBCUA"    Imaging Studies:  Reviewed.   Chest x ray Impression:     Detrimental interval change in the appearance of the chest since 04/29/2025 is appreciated, as the current examination demonstrates interval development of some airspace consolidation in the left upper lobe.  Continued demonstration of cardiomegaly.  Airspace consolidation in the right lower lung zone seen on 04/29/2025 has resolved.       Assessment/Plan:  69 y.o. male with ESRD presumably 2/2 HTN/T2DM s/p DDRT (11/5/2016) on tacrolimus//cellcept/pred, who is admitted for recurrent SUAD pneumonia.      ESRD 2/2 HTN/T2DM s/p cadaveric kidney transplant (11/5/16)  PHUONG on CKD4  - baseline Cr ~2's with eGFR ~20s  - suspect PHUONG 2/2 hemodynamic instability and some volume depletion , poor appetite, fever  - Continue Hydration     Immunosuppression Management  - Last tac level 5.5 05/08/25  - continue Prograf 1mg BID; goal trough level is 5-7ng/ml  - continue prednisone 5mg daily  - due to recurrent pneumonias, will continue hold MMF  Anemia  - Hb currently 9 g/dl  - Tsat 30% (02/2025)  - last known ferritin in system 800s in 04/2024  - patient receives EPO injections outpatient  - continue to " monitor    Hyperglycemia  - blood sugars have been 132  in the last 24 hours  - continue SSI    HTN  - BP in /63 -- during PM time has been elevated  - continue to monitor      Everett Zamora MD  [facility] Nephrology Fellow, PGY-4  Kidney Transplant Medicine       [1]   Social History  Socioeconomic History    Marital status:    Occupational History     Employer: [employer]   Tobacco Use    Smoking status: Former     Current packs/day: 0.00     Average packs/day: 0.5 packs/day for 32.0 years (16.0 ttl pk-yrs)     Types: Cigarettes     Start date: 1984     Quit date: 2016     Years since quittin.4    Smokeless tobacco: Never   Substance and Sexual Activity    Alcohol use: Yes     Comment: Pt reports occasional beers on Sundays. Pt reports drinking daily prior to ESRD diagnosis.    Drug use: No    Sexual activity: Yes     Partners: Female   Social History Narrative     for 14 years     for 38 years    2 children ages 41, 42     Social Drivers of Health     Financial Resource Strain: Low Risk  (2025)    Overall Financial Resource Strain (CARDIA)     Difficulty of Paying Living Expenses: Not hard at all   Food Insecurity: No Food Insecurity (2025)    Hunger Vital Sign     Worried About Running Out of Food in the Last Year: Never true     Ran Out of Food in the Last Year: Never true   Transportation Needs: No Transportation Needs (2025)    PRAPARE - Transportation     Lack of Transportation (Medical): No     Lack of Transportation (Non-Medical): No   Physical Activity: Insufficiently Active (2025)    Exercise Vital Sign     Days of Exercise per Week: 7 days     Minutes of Exercise per Session: 10 min   Stress: No Stress Concern Present (2025)    [employer] [place] [employer] - Occupational Stress Questionnaire     Feeling of Stress : Not at all   Housing Stability: Low Risk  (2025)    Housing Stability Vital Sign     Unable  to Pay for Housing in the Last Year: No     Homeless in the Last Year: No   [2] (Not in a hospital admission)

## 2025-05-22 NOTE — PHARMACY MED REC
"  Admission Medication History     The home medication history was taken by Veronique Harris.    You may go to "Admission" then "Reconcile Home Medications" tabs to review and/or act upon these items.     The home medication list has been updated by the Pharmacy department.   Please read ALL comments highlighted in yellow.   Please address this information as you see fit.    Feel free to contact us if you have any questions or require assistance.      The medications listed below were removed from the home medication list. Please reorder if appropriate:  Patient reports no longer taking the following medication(s):  LACTOBACILLUS RHAMNOSUS GG CAP    Medications listed below were obtained from: Patient/family and Analytic software- [employer]  Current Outpatient Medications on File Prior to Encounter   Medication Sig    atorvastatin (LIPITOR) 80 MG tablet Take 1 tablet (80 mg total) by mouth once daily.    cloNIDine 0.1 mg/24 hr td ptwk (CATAPRES) 0.1 mg/24 hr Place 1 patch onto the skin every 7 days.    doxazosin (CARDURA) 8 MG Tab Take 1 tablet (8 mg total) by mouth once daily.    empagliflozin (JARDIANCE) 10 mg tablet Take 1 tablet (10 mg total) by mouth once daily.    eplerenone (INSPRA) 50 MG Tab Take 1 tablet (50 mg total) by mouth once daily.    famotidine (PEPCID AC ORAL) Take 1 tablet by mouth daily as needed (Acid reflux).    gabapentin (NEURONTIN) 300 MG capsule Take 1 capsule (300 mg total)\ by mouth 3 (three) times daily.    hydrALAZINE (APRESOLINE) 100 MG tablet Take 1 tablet (100 mg total) by mouth every 8 (eight) hours.    hydroCHLOROthiazide (HYDRODIURIL) 25 MG tablet Take 1 tablet (25 mg total) by mouth once daily.    insulin aspart U-100 (NOVOLOG U-100 INSULIN ASPART) 100 unit/mL injection Use in pump, max daily 100 units.    magnesium oxide (MAG-OX) 400 mg (241.3 mg magnesium) tablet Take 1 tablet (400 mg total) by mouth 2 (two) times daily.    meclizine (ANTIVERT) 25 mg tablet Take 1 tablet (25 mg " "total) by mouth 3 (three) times daily as needed for Dizziness.    [Paused] mycophenolate (CELLCEPT) 250 mg Cap Take 2 capsules (500 mg total) by mouth 2 (two) times daily. (May resume/based on blood work)    predniSONE (DELTASONE) 5 MG tablet Take 1 tablet (5 mg total) by mouth once daily.    rivaroxaban (XARELTO) 15 mg Tab Take 1 tablet (15 mg total) by mouth daily with dinner or evening meal.    tacrolimus (PROGRAF) 1 MG Cap Take 1 capsule (1 mg total) by mouth every 12 (twelve) hours.    valsartan (DIOVAN) 320 MG tablet Take 1 tablet (320 mg total) by mouth once daily.    [DISCONTINUED] insulin  cart,aut,G6/7,cntr (OMNIPOD 5 G6-G7 INTRO KT,GEN5,) Crtg Use as directed.    blood sugar diagnostic Strp Use to check blood glucose 1 times daily, to use with insurance preferred meter    blood-glucose meter Misc Use to check blood glucose 1 times daily, to use with insurance preferred meter    blood-glucose sensor (DEXCOM G7 SENSOR) Kayla Change sensor every 10 days.    ergocalciferol (ERGOCALCIFEROL) 50,000 unit Cap Take 1 capsule (50,000 Units total) by mouth every 7 days. Takes on Fridays.    insulin pump cart,auto,BT,G6/7 (OMNIPOD 5 G6-G7 PODS, GEN 5,) Crtg Change every 48 hours.    lancets 30 gauge Misc Use to check blood glucose 1 times daily, to use with insurance preferred meter    pen needle, diabetic (BD ULTRA-FINE LO PEN NEEDLE) 32 gauge x 5/32" Ndle Use to administer insulin 4 times daily.    polyethylene glycol (MIRALAX) 17 gram PwPk Take 17 gm (mixed in liquid) by mouth every day.               Veronique Harris  [id number]                .          "

## 2025-05-22 NOTE — ASSESSMENT & PLAN NOTE
Patient has permanent atrial fibrillation. Patient is currently in atrial fibrillation. [id number] Score: 3. The patients heart rate in the last 24 hours is as follows:  Pulse  Min: 64  Max: 80     Antiarrhythmics       Anticoagulants  rivaroxaban tablet 15 mg, With dinner, Oral    Plan  - Replete lytes with a goal of K>4, Mg >2  - Patient is anticoagulated, see medications listed above.  - Patient's afib is currently controlled

## 2025-05-22 NOTE — ASSESSMENT & PLAN NOTE
Creatine stable for now. BMP reviewed- noted Estimated Creatinine Clearance: 27 mL/min (A) (based on SCr of 3 mg/dL (H)). according to latest data. Based on current GFR, CKD stage is stage 4 - GFR 15-29.  Monitor UOP and serial BMP and adjust therapy as needed. Renally dose meds. Avoid nephrotoxic medications and procedures.    Plan:  -- has home insulin pump  -- Consult endocrine  -- POC glucose ACHS

## 2025-05-22 NOTE — HPI
Reason for Consult: Management of T2DM, Hyperglycemia      Diabetes diagnosis year: 2000      Home Diabetes Medications:    OMNIPOD 5  0.6 u/hr mn-0600 , 9p-mn, 0.7 u/hr 6a-9p   Target 120-130 mg/dl   Iob 3 hours  Max bolus 20 units   Max basal 2 u/hr   Isf 40 mn-mn  ICR 1 to 7.5      Presets:  Snack: 4 units (28g)  Small Meal: 8 units (56g)  Medium Meal: 12 units (90g)  Large Meal: 15 units (114g)  Super Large Meal: 18 units (130g)     How often checking glucose at home? >4 x day   BG readings on regimen: 150s  Hypoglycemia on the regimen?  No  Missed doses on regimen?  No     Diabetes Complications include:     Hyperglycemia     Complicating diabetes co morbidities:   S/p Kidney tx 2016         HPI: his is a 69-year-old male with a history of DM, renal transplant 2016, CHFpEF, AF/AFL (PVI/PWI/CTI 6/2024), CVA, recurrent PNA who presents with fevers, chills. States that symptoms are similar to last month when he was admitted for pneumonia. Was in his usual state of health until last night when he started experiencing intermittent fevers, chills. Received IV vancomycin, azithromycin, Zosyn.  Septic workup obtained. Endocrine conulsted to manage hyperglycemia, type 2 diabetes, and insulin pump therapy.    Lab Results   Component Value Date    HGBA1C 8.3 (H) 03/27/2025

## 2025-05-22 NOTE — ASSESSMENT & PLAN NOTE
Patient has permanent atrial fibrillation. Patient is currently in atrial fibrillation. [id number] Score: 3. The patients heart rate in the last 24 hours is as follows:  Pulse  Min: 64  Max: 80     Antiarrhythmics       Anticoagulants  rivaroxaban tablet 15 mg, With dinner, Oral    Plan  - Replete lytes with a goal of K>4, Mg >2  - Patient is anticoagulated, see medications listed above.  - Patient's afib is currently controlled

## 2025-05-22 NOTE — PROGRESS NOTES
"Pharmacokinetic Initial Assessment & Plan: IV Vancomycin    IV Vancomycin 2000 mg x once given in the ED on 5/22 @ 1209  Then Vancomycin 750 mg every 24 hours, starting at 0630 on 5/23  Obtain a Vancomycin trough 30 mins prior to the 3rd dose on 5/24 @ 0600 with AM labs  Desired empiric serum trough concentration is 15 to 20 mcg/mL    Pharmacy will continue to follow and monitor vancomycin.    [id number] with any questions regarding this assessment.     Thank you for the consult,   Evelia Case         Patient brief summary:  Ravi Zamorano is a 69 y.o. male initiated on antimicrobial therapy with IV Vancomycin for treatment of  Left upper lobe consolidation     Drug Allergies:   Review of patient's allergies indicates:   Allergen Reactions    Nifedipine Other (See Comments)     Gingival hyperplasia       Actual Body Weight:   90.7 kg    Renal Function:   Estimated Creatinine Clearance: 27 mL/min (A) (based on SCr of 3 mg/dL (H)).,     Dialysis Method (if applicable):  N/A    CBC (last 72 hours):  Recent Labs   Lab Result Units 05/22/25  1153   WBC K/uL 12.64   HGB gm/dL 9.0*   HCT % 30.5*   Platelet Count K/uL 142*   Lymph % % 5.0*   Mono % % 11.6   Eos % % 0.1   Basophil % % 0.3       Metabolic Panel (last 72 hours):  Recent Labs   Lab Result Units 05/22/25  1153 05/22/25  1302   Sodium mmol/L 132*  --    Potassium mmol/L 4.1  --    Chloride mmol/L 98  --    CO2 mmol/L 22*  --    Glucose mg/dL 80  --    Glucose, UA   --  Trace*   BUN mg/dL 55*  --    Creatinine mg/dL 3.0*  --    Albumin g/dL 2.9*  --    Bilirubin Total mg/dL 0.6  --    ALP unit/L 54  --    AST unit/L 13  --    ALT unit/L <5*  --    Magnesium  mg/dL 2.2  --    Phosphorus Level mg/dL 3.9  --        Drug levels (last 3 results):  No results for input(s): "VANCOMYCINRA", "VANCORANDOM", "VANCOMYCINPE", "VANCOPEAK", "VANCOMYCINTR", "VANCOTROUGH" in the last 72 hours.    Microbiologic Results:  Microbiology Results (last 7 days)       Procedure Component " Value Units Date/Time    Culture, Respiratory with Gram Stain [[id number]]     Order Status: Sent Specimen: Respiratory     Blood culture x two cultures. Draw prior to antibiotics. [[id number]] Collected: 05/22/25 1153    Order Status: Resulted Specimen: Blood from Peripheral, Forearm, Left Updated: 05/22/25 1213    Blood culture x two cultures. Draw prior to antibiotics. [[id number]] Collected: 05/22/25 1153    Order Status: Resulted Specimen: Blood from Peripheral, Hand, Left Updated: 05/22/25 1213

## 2025-05-22 NOTE — ASSESSMENT & PLAN NOTE
Anemia is likely due to nutritional deficiency. Most recent hemoglobin and hematocrit are listed below.  Recent Labs     05/22/25  1153 05/22/25  1216   HGB 9.0*  --    HCT 30.5* 33*     Plan  - Monitor serial CBC: Daily  - Transfuse PRBC if patient becomes hemodynamically unstable, symptomatic or H/H drops below 7/21.  - Patient has not received any PRBC transfusions to date  - Patient's anemia is currently being monitored

## 2025-05-22 NOTE — SUBJECTIVE & OBJECTIVE
Interval HPI:   Overnight events: Patient in room ED 13/13. Blood glucose stable. BG at goal on current insulin regimen (Home Insulin Pump). Steroid use- None.    Renal function- Abnormal - Creatinine 3.0   Vasopressors-  None       Endocrine will continue to follow and manage insulin orders inpatient.         No diet orders on file     Eating:   NPO  Nausea: No  Hypoglycemia and intervention: No  Fever: No  TPN and/or TF: No      PMH, PSH, FH, SH updated and reviewed     ROS:  Review of Systems   Constitutional:  Negative for unexpected weight change.   Eyes:  Negative for visual disturbance.   Respiratory:  Negative for cough.    Gastrointestinal:  Negative for nausea and vomiting.   Endocrine: Negative for polydipsia and polyuria.   Musculoskeletal:  Negative for back pain.   Skin:  Negative for rash.   Neurological:  Negative for syncope.   Psychiatric/Behavioral:  Negative for agitation and dysphoric mood.        Current Medications and/or Treatments Impacting Glycemic Control  Immunotherapy:    Immunosuppressants       None          Steroids:   Hormones (From admission, onward)      None          Pressors:    Autonomic Drugs (From admission, onward)      None          Hyperglycemia/Diabetes Medications:   Antihyperglycemics (From admission, onward)      Start     Stop Route Frequency Ordered    05/22/25 1530  empagliflozin (Jardiance) tablet 10 mg        Question Answer Comment   Does this patient have a diagnosis of heart failure? Yes    Does this patient have type 1 diabetes or diabetic ketoacidosis? No    Does this patient have symptomatic hypotension? No    Is the patient NPO or pending major surgery in next 3 days or less? No        -- Oral Daily 05/22/25 1425             PHYSICAL EXAMINATION:  Vitals:    05/22/25 1400   BP: (!) 177/80   Pulse: 64   Resp:    Temp:      Body mass index is 26.39 kg/m².     Physical Exam  Constitutional:       Appearance: He is well-developed.   HENT:      Head:  Normocephalic.   Eyes:      Conjunctiva/sclera: Conjunctivae normal.   Pulmonary:      Effort: Pulmonary effort is normal.   Musculoskeletal:         General: Normal range of motion.   Skin:     General: Skin is warm.      Findings: No rash.   Neurological:      Mental Status: He is alert and oriented to person, place, and time.

## 2025-05-22 NOTE — H&P
[facility] - Emergency Dept  [facility] Medicine  History & Physical    Patient Name: Ravi Zamorano  MRN: [id number]  Patient Class: IP- Inpatient  Admission Date: 5/22/2025  Attending Physician: Nina Gross MD   Primary Care Provider: Mima Mack MD         Patient information was obtained from patient and ER records.     Subjective:     Principal Problem:Left upper lobe consolidation    Chief Complaint:   Chief Complaint   Patient presents with    Multiple complaints      Fever, chills, body aches, coughing up blood, kidney transplant pt 2016        HPI: Ravi Zamorano is a 68yo male with a hx of CKD s/p kidney transplant (2016) on tacrolimus, afib (on xarelto), HTN, HLD, CHF, CVA who presented for evaluation of cough, fever, chills, mild hemoptysis, and lower back pain starting about 4am this morning. He ran 101F fever last night. Low back pain resolved with tylenol. His hemoptysis occurred 4 times this morning with about 1/2 teaspoon of blood each time. He denies any headache, vision changes, chest pain, abdominal pain, N/V/D, or dysuria. He has leg swelling at his baseline.     He was recently admitted 4/29-5/10 for pneumonia. Completed 7 day cefipime course on 5/5. At the time there was concern for aspiration pneumonia given his large hiatal hernia. FEES was performed but barium swallow was deferred.     In the ED VSS, afebrile. CXR notable for interval development of airspace consolidation of SUAD and resolution of RLL consolidation. Labs notable for HS troponin 89 (stable), BNP 1534 (stable), procal 1.82, UA noninfectious, Na 132, BUN 55, Cr 3.0, hgb 9.0 (stable), no leukocytosis.     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    CKD (chronic kidney disease) stage 4, GFR 15-29 ml/min 07/29/2014    Colon polyps 2014    Depression  - situational 07/29/2014    Diabetes type 2 since 2000 07/29/2014    Diabetic neuropathy 07/29/2014    Encounter for blood transfusion     History of cardioversion 10/03/2019    History of hepatitis C, s/p successful Rx w/ SVR24 - 9/2017 07/29/2014    Genotype 1a, treatment naive 10/2014 liver biopsy - grade 1 / stage 1 Completed Harvoni w/ SVR    Hyperlipidemia 07/29/2014    Hypertension     Neuropathy     Organ transplant candidate 07/29/2014    Pancreatitis     S/P cadaveric kidney transplant 11/5/2016. ESRD secondary to HTN/DMII 11/05/2016    Type 2 diabetes mellitus with stage 3a chronic kidney disease, with long-term current use of insulin 07/29/2014       Past Surgical History:   Procedure Laterality Date    ABLATION OF ARRHYTHMOGENIC FOCUS FOR ATRIAL FIBRILLATION N/A 6/11/2024    Procedure: Ablation atrial fibrillation;  Surgeon: Bronson Bowden MD;  Location: [facility] EP LAB;  Service: Cardiology;  Laterality: N/A;  AF, FELICIANO (cx if SR), PVI, RFA, Carto, Gen, GP, 3 Prep    ABLATION, ATRIAL FLUTTER, TYPICAL  6/11/2024    Procedure: Ablation, Atrial Flutter, Typical;  Surgeon: Bronson Bowden MD;  Location: [facility] EP LAB;  Service: Cardiology;;    APPENDECTOMY      BONE MARROW BIOPSY Left 6/26/2024    Procedure: Biopsy-bone marrow;  Surgeon: Bridger Zapata MD;  Location: [facility] ENDO ([address] [place] [address]);  Service: Oncology;  Laterality: Left;  6/24-pt knows to hold aspirin and eliquis as instructed by hem/onc, precall complete-Kpvt    CARDIOVERSION  6/11/2024    Procedure: Cardioversion;  Surgeon: Bronson Bowden MD;  Location: [facility] EP LAB;  Service: Cardiology;;    CATARACT EXTRACTION W/  INTRAOCULAR LENS IMPLANT Right 3/13/2024    Procedure: EXTRACTION, CATARACT, WITH IOL INSERTION;  Surgeon: Jennifer Palacio MD;  Location: [facility] OR;  Service: Ophthalmology;  Laterality: Right;    CATARACT EXTRACTION W/  INTRAOCULAR LENS IMPLANT Left 4/10/2024    Procedure: EXTRACTION, CATARACT, WITH IOL INSERTION;  Surgeon: Pia  Jennifer YE MD;  [facility]: [facility] OR;  Service: Ophthalmology;  Laterality: Left;    COLONOSCOPY N/A 12/21/2020    Procedure: COLONOSCOPY;  Surgeon: Tico Bell MD;  [facility]  Service: Endoscopy;  Laterality: N/A;  ok to hold eliquis per Dr. Valadez, see telephone encounter 11/13/2020-MS  covid test 12/18 [facility]    ECHOCARDIOGRAM,TRANSESOPHAGEAL N/A 2/3/2025    Procedure: Transesophageal echo (FELICIANO) intra-procedure log documentation;  Surgeon: Grayson Lin III, MD;  Location: [facility] EP LAB;  Service: Cardiology;  Laterality: N/A;    KIDNEY TRANSPLANT      TRANSESOPHAGEAL ECHOCARDIOGRAPHY N/A 8/26/2019    Procedure: ECHOCARDIOGRAM, TRANSESOPHAGEAL;  Surgeon: [facility] Provider;  Location: [facility] EP LAB;  Service: Cardiology;  Laterality: N/A;    TRANSESOPHAGEAL ECHOCARDIOGRAPHY N/A 6/11/2024    Procedure: ECHOCARDIOGRAM, TRANSESOPHAGEAL;  Surgeon: Grayson Lin III, MD;  Location: [facility] EP LAB;  Service: Cardiology;  Laterality: N/A;    TREATMENT OF CARDIAC ARRHYTHMIA N/A 8/26/2019    Procedure: CARDIOVERSION;  Surgeon: Bronson Bowden MD;  Location: [facility] EP LAB;  Service: Cardiology;  Laterality: N/A;  AF, FELICIANO, DCCV, MAC, GP, 3 PREP    TREATMENT OF CARDIAC ARRHYTHMIA N/A 2/3/2025    Procedure: Cardioversion or Defibrillation;  Surgeon: Bronson Bowden MD;  Location: [facility] EP LAB;  Service: Cardiology;  Laterality: N/A;  AF, FELICIANO, DCCV, MAC, GP, 3 Prep       Review of patient's allergies indicates:   Allergen Reactions    Nifedipine Other (See Comments)     Gingival hyperplasia       Current Facility-Administered Medications on File Prior to Encounter   Medication    balanced salt irrigation intra-ocular solution 1 drop    epoetin tanya injection 4,000 Units    phenylephrine HCL 10% ophthalmic solution 1 drop    proparacaine 0.5 % ophthalmic solution 1 drop    sodium chloride 0.9% flush 10 mL    TETRAcaine HCl (PF) 0.5 % Drop 1 drop     Current Outpatient Medications on File Prior to  Encounter   Medication Sig    atorvastatin (LIPITOR) 80 MG tablet Take 1 tablet (80 mg total) by mouth once daily.    cloNIDine 0.1 mg/24 hr td ptwk (CATAPRES) 0.1 mg/24 hr Place 1 patch onto the skin every 7 days.    doxazosin (CARDURA) 8 MG Tab Take 1 tablet (8 mg total) by mouth once daily.    empagliflozin (JARDIANCE) 10 mg tablet Take 1 tablet (10 mg total) by mouth once daily.    eplerenone (INSPRA) 50 MG Tab Take 1 tablet (50 mg total) by mouth once daily.    gabapentin (NEURONTIN) 300 MG capsule Take 1 capsule (300 mg total) by mouth 2 (two) times daily.    hydrALAZINE (APRESOLINE) 100 MG tablet Take 1 tablet (100 mg total) by mouth every 8 (eight) hours.    hydroCHLOROthiazide (HYDRODIURIL) 25 MG tablet Take 1 tablet (25 mg total) by mouth once daily.    insulin aspart U-100 (NOVOLOG U-100 INSULIN ASPART) 100 unit/mL injection Use in pump, max daily 100 units.    insulin  cart,aut,G6/7,cntr (OMNIPOD 5 G6-G7 INTRO KT,GEN5,) Crtg Use as directed.    magnesium oxide (MAG-OX) 400 mg (241.3 mg magnesium) tablet Take 1 tablet (400 mg total) by mouth 2 (two) times daily.    meclizine (ANTIVERT) 25 mg tablet Take 1 tablet (25 mg total) by mouth 3 (three) times daily as needed for Dizziness.    [Paused] mycophenolate (CELLCEPT) 250 mg Cap Take 2 capsules (500 mg total) by mouth 2 (two) times daily.    predniSONE (DELTASONE) 5 MG tablet Take 1 tablet (5 mg total) by mouth once daily.    rivaroxaban (XARELTO) 15 mg Tab Take 1 tablet (15 mg total) by mouth daily with dinner or evening meal.    tacrolimus (PROGRAF) 1 MG Cap Take 1 capsule (1 mg total) by mouth every 12 (twelve) hours.    valsartan (DIOVAN) 320 MG tablet Take 1 tablet (320 mg total) by mouth once daily.    blood sugar diagnostic Strp Use to check blood glucose 1 times daily, to use with insurance preferred meter    blood-glucose meter Misc Use to check blood glucose 1 times daily, to use with insurance preferred meter    blood-glucose sensor (DEXCOM  "G7 SENSOR) Kayla Change sensor every 10 days.    ergocalciferol (ERGOCALCIFEROL) 50,000 unit Cap Take 1 capsule (50,000 Units total) by mouth every 7 days. (Patient taking differently: Take 50,000 Units by mouth every 7 days. )    insulin pump cart,auto,BT,G6/7 (OMNIPOD 5 G6-G7 PODS, GEN 5,) Crtg Change every 48 hours.    Lactobacillus rhamnosus GG (CULTURELLE) 10 billion cell capsule Take 1 capsule by mouth once daily.    lancets 30 gauge Misc Use to check blood glucose 1 times daily, to use with insurance preferred meter    pen needle, diabetic (BD ULTRA-FINE LO PEN NEEDLE) 32 gauge x 5/32" Ndle USE TO ADMINISTER INSULIN 4 TIMES DAILY.    polyethylene glycol (MIRALAX) 17 gram PwPk Take 17 gm (mixed in liquid) by mouth every day.    [DISCONTINUED] insulin lispro (HUMALOG KWIKPEN INSULIN) 100 unit/mL pen Inject 18 units w/ breakfast, 16 units w/ lunch and dinner plus scale 150-200 +2, 201-250 +4, 251-300 +6, 301-350 +8.     Family History       Problem Relation (Age of Onset)    Cancer Brother    Diabetes Mother    Heart disease Mother, Father    Hypertension Mother, Brother          Tobacco Use    Smoking status: Former     Current packs/day: 0.00     Average packs/day: 0.5 packs/day for 32.0 years (16.0 ttl pk-yrs)     Types: Cigarettes     Start date: 1984     Quit date: 2016     Years since quittin.4    Smokeless tobacco: Never   Substance and Sexual Activity    Alcohol use: Yes     Comment: Pt reports occasional beers on Sundays. Pt reports drinking daily prior to ESRD diagnosis.    Drug use: No    Sexual activity: Yes     Partners: Female     Review of Systems   Constitutional:  Positive for chills and fever.   HENT:  Negative for congestion.    Respiratory:  Positive for cough. Negative for shortness of breath.         Hemoptysis   Cardiovascular:  Negative for chest pain.   Gastrointestinal:  Negative for abdominal distention and abdominal pain.   Genitourinary:  Negative for dysuria. "   Musculoskeletal:  Positive for back pain.   Neurological:  Negative for headaches.     Objective:     Vital Signs (Most Recent):  Temp: 98.5 °F (36.9 °C) (05/22/25 0940)  Pulse: 64 (05/22/25 1400)  Resp: 18 (05/22/25 1400)  BP: (!) 177/80 (05/22/25 1400)  SpO2: 97 % (05/22/25 1400) Vital Signs (24h Range):  Temp:  [98.5 °F (36.9 °C)] 98.5 °F (36.9 °C)  Pulse:  [64-80] 64  Resp:  [16-20] 18  SpO2:  [97 %] 97 %  BP: (137-177)/(63-80) 177/80     Weight: 90.7 kg (200 lb)  Body mass index is 26.39 kg/m².     Physical Exam  Constitutional:       General: He is not in acute distress.     Appearance: Normal appearance. He is not ill-appearing.   HENT:      Head: Normocephalic and atraumatic.      Nose: No congestion.   Eyes:      Extraocular Movements: Extraocular movements intact.      Conjunctiva/sclera: Conjunctivae normal.   Cardiovascular:      Rate and Rhythm: Normal rate. Rhythm irregular.      Pulses: Normal pulses.      Heart sounds: Normal heart sounds.   Pulmonary:      Effort: Pulmonary effort is normal. No respiratory distress.      Breath sounds: Rhonchi (Left) present.   Abdominal:      Palpations: Abdomen is soft.      Tenderness: There is no abdominal tenderness.   Musculoskeletal:      Right lower leg: Edema present.      Left lower leg: Edema present.   Skin:     General: Skin is warm.   Neurological:      General: No focal deficit present.      Mental Status: He is alert and oriented to person, place, and time.   Psychiatric:         Mood and Affect: Mood normal.         Thought Content: Thought content normal.                Significant Labs: All pertinent labs within the past 24 hours have been reviewed.  CBC:   Recent Labs   Lab 05/22/25  1153 05/22/25  1216   WBC 12.64  --    HGB 9.0*  --    HCT 30.5* 33*   *  --      CMP:   Recent Labs   Lab 05/22/25  1153   *   K 4.1   CL 98   CO2 22*   GLU 80   BUN 55*   CREATININE 3.0*   CALCIUM 8.8   PROT 6.1   ALBUMIN 2.9*   BILITOT 0.6   ALKPHOS  54   AST 13   ALT <5*   ANIONGAP 12       Significant Imaging: I have reviewed all pertinent imaging results/findings within the past 24 hours.    CXR - Detrimental interval change in the appearance of the chest since 04/29/2025 is appreciated, as the current examination demonstrates interval development of some airspace consolidation in the left upper lobe. Continued demonstration of cardiomegaly. Airspace consolidation in the right lower lung zone seen on 04/29/2025 has resolved.   Assessment/Plan:     Assessment & Plan  Left upper lobe consolidation  Hemoptysis  Has hx of recurrent pneumonia with multiple hospitalizations, most recently discharged 5/10/25 for RLL pneumonia. Presented to [facility] ED with cough, subjective fevers/chills, hemoptysis, and new SUAD consolidation seen on Xray. Initial labs with borderline leukocytosis (12.6k), Na 132. No recent respiratory cultures. Negative resp infection panels in March and early May 2025. Legionella antigen negative 5/2/25.     CT chest 5/1/25 with bilateral multilobal consolidations and GGO associated with bronchial thickening suggestive of infectious/inflammatory process. Pulmonology had been consulted during recent hospitalization and recommend SLP with esophogram given hiatal hernia and GERD symptoms. SLP evaluated with Endoscopic Evaluation of a Swallow (FEES). Recommended regular diet/thin liquids and deemed no further ST warranted. Esophogram/MBSS not completed at that time.      Differentials include CAP, MAC, aspiration PNA, aspiration pneumonitis, TB, PJP. Lower suspicion for PE or infarction (given anticoagulated).     Plan:  -- TB r/o given immunosuppressed and recurrent pna   -- continue vanc/cefepime given recent hospitalization  -- f/u CT Chest non-contrast   -- f/u respiratory gram stain and culture  -- f/u blood cultures    Type 2 diabetes mellitus with stage 4 chronic kidney disease, with long-term current use of insulin  Insulin pump in place  Creatine  stable for now. BMP reviewed- noted Estimated Creatinine Clearance: 27 mL/min (A) (based on SCr of 3 mg/dL (H)). according to latest data. Based on current GFR, CKD stage is stage 4 - GFR 15-29.  Monitor UOP and serial BMP and adjust therapy as needed. Renally dose meds. Avoid nephrotoxic medications and procedures.    Plan:  -- has home insulin pump  -- Consult endocrine  -- POC glucose ACHS      Hyperlipidemia  Continue home lipitor 8omg    Chronic combined systolic (congestive) and diastolic (congestive) heart failure  Elevated brain natriuretic peptide (BNP) level  Patient has Combined Systolic and Diastolic heart failure that is Chronic. On presentation their CHF was well compensated. Most recent BNP and echo results are listed below.  Recent Labs     05/22/25  1153   BNP 1,534*     Latest ECHO  Results for orders placed during the hospital encounter of 02/20/25    Echo    Interpretation Summary    Left Ventricle: There is mild concentric hypertrophy. There is low normal systolic function with a visually estimated ejection fraction of 50 - 55%. Grade III diastolic dysfunction.    Left Ventricle: The left ventricle is at the upper limits of normal in size. Mildly increased wall thickness. There is mild concentric hypertrophy. Regional wall motion abnormalities present. See diagram for wall motion findings. There is low normal systolic function with a visually estimated ejection fraction of 50 - 55%. Ejection fraction is approximately 53%. Grade III diastolic dysfunction.    Right Ventricle: Systolic function is borderline low despite the low TAPSE.    Left Atrium: Left atrium is moderately dilated.    Aortic Valve: The aortic valve is a trileaflet valve. There is mild aortic valve sclerosis. There is moderate annular calcification present. There is mild aortic regurgitation with a centrally directed jet.    Mitral Valve: There is mild regurgitation with a centrally directed jet.    Pericardium: There is a  trivial posterior effusion and under the RA.    Tricuspid Valve: There is mild regurgitation.    Pulmonary Artery: The estimated pulmonary artery systolic pressure is 30 mmHg.    Current Heart Failure Medications  hydrALAZINE tablet 100 mg, Every 8 hours, Oral  empagliflozin (Jardiance) tablet 10 mg, Daily, Oral    BNP 1500, similar to prior admission    Plan  - Monitor strict I&Os and daily weights.    - Place on telemetry  - Low sodium diet  - Place on fluid restriction of 1.5 L.   - The patient's volume status is at their baseline  - will consider diuretics    S/P cadaveric kidney transplant 11/5/2016. ESRD secondary to HTN/DMII  Immunocompromised state due to drug therapy  Prophylactic immunotherapy  KTM consulted. Unclear baseline. Cr 2.5 3 weeks ago, now 3.0    - avoid nephrotoxic agents  - follow up ktm recs    Persistent atrial fibrillation  Anticoagulant long-term use  Patient has permanent atrial fibrillation. Patient is currently in atrial fibrillation. [id number] Score: 3. The patients heart rate in the last 24 hours is as follows:  Pulse  Min: 64  Max: 80     Antiarrhythmics       Anticoagulants  rivaroxaban tablet 15 mg, With dinner, Oral    Plan  - Replete lytes with a goal of K>4, Mg >2  - Patient is anticoagulated, see medications listed above.  - Patient's afib is currently controlled    BPH with urinary obstruction  Continue doxazosin    Elevated troponin  Chronically elevated. At baseline    PAD (peripheral artery disease)  Continue home lipitor    Anemia of chronic disease  Anemia is likely due to nutritional deficiency. Most recent hemoglobin and hematocrit are listed below.  Recent Labs     05/22/25  1153 05/22/25  1216   HGB 9.0*  --    HCT 30.5* 33*     Plan  - Monitor serial CBC: Daily  - Transfuse PRBC if patient becomes hemodynamically unstable, symptomatic or H/H drops below 7/21.  - Patient has not received any PRBC transfusions to date  - Patient's anemia is currently being  monitored    Embolic stroke involving right middle cerebral artery  Historic. No residual deficits. Remains on rivaroxaban and lipitor.    Hyponatremia  Hyponatremia is likely due to Heart Failure and/or HCTZ use. The patient's most recent sodium results are listed below.  Recent Labs     05/22/25  1153   *     Plan  - monitor with daily cmp    VTE Risk Mitigation (From admission, onward)           Ordered     rivaroxaban tablet 15 mg  With dinner         05/22/25 1685                                    Sarahi Cano MD  Department of Hospital Medicine  [facility] - Emergency Dept

## 2025-05-22 NOTE — ASSESSMENT & PLAN NOTE
Endocrinology consulted for BG management.   BG goal 140-180    - Home Insulin Pump  - BG checks /HS/0200  - Hypoglycemia protocol in place  - If blood glucose greater than 300, please ask patient not to eat food or drink anything other than water until correctional insulin has brought it back below 250    ** Please notify Endocrine for any change and/or advance in diet**  ** Please call Endocrine for any BG related issues **    Discharge Planning:   TBD. Please notify endocrinology prior to discharge.

## 2025-05-22 NOTE — SUBJECTIVE & OBJECTIVE
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    CKD (chronic kidney disease) stage 4, GFR 15-29 ml/min 07/29/2014    Colon polyps 2014    Depression - situational 07/29/2014    Diabetes type 2 since 2000 07/29/2014    Diabetic neuropathy 07/29/2014    Encounter for blood transfusion     History of cardioversion 10/03/2019    History of hepatitis C, s/p successful Rx w/ SVR24 - 9/2017 07/29/2014    Genotype 1a, treatment naive 10/2014 liver biopsy - grade 1 / stage 1 Completed Harvoni w/ SVR    Hyperlipidemia 07/29/2014    Hypertension     Neuropathy     Organ transplant candidate 07/29/2014    Pancreatitis     S/P cadaveric kidney transplant 11/5/2016. ESRD secondary to HTN/DMII 11/05/2016    Type 2 diabetes mellitus with stage 3a chronic kidney disease, with long-term current use of insulin 07/29/2014       Past Surgical History:   Procedure Laterality Date    ABLATION OF ARRHYTHMOGENIC FOCUS FOR ATRIAL FIBRILLATION N/A 6/11/2024    Procedure: Ablation atrial fibrillation;  Surgeon: Bronson Bowden MD;  Location: [facility] EP LAB;  Service: Cardiology;  Laterality: N/A;  AF, FELICIANO (cx if SR), PVI, RFA, Carto, Gen, GP, 3 Prep    ABLATION, ATRIAL FLUTTER, TYPICAL  6/11/2024    Procedure: Ablation, Atrial Flutter, Typical;  Surgeon: Bronson Bowden MD;  Location: [facility] EP LAB;  Service: Cardiology;;    APPENDECTOMY      BONE MARROW BIOPSY Left 6/26/2024    Procedure: Biopsy-bone marrow;  Surgeon: Bridger Zapata MD;  Location: [facility] ENDO ([address] [place] [address]);  Service: Oncology;  Laterality: Left;  6/24-pt knows to hold aspirin and eliquis as instructed by hem/onc, precall complete-Kpvt    CARDIOVERSION  6/11/2024    Procedure: Cardioversion;  Surgeon: Bronson Bowden MD;  Location: [facility] EP LAB;  Service: Cardiology;;    CATARACT EXTRACTION W/  INTRAOCULAR LENS IMPLANT  Right 3/13/2024    Procedure: EXTRACTION, CATARACT, WITH IOL INSERTION;  Surgeon: Jennifer Palacio MD;  Location: [facility] OR;  Service: Ophthalmology;  Laterality: Right;    CATARACT EXTRACTION W/  INTRAOCULAR LENS IMPLANT Left 4/10/2024    Procedure: EXTRACTION, CATARACT, WITH IOL INSERTION;  Surgeon: Jennifer Palacio MD;  Location: [facility] OR;  Service: Ophthalmology;  Laterality: Left;    COLONOSCOPY N/A 12/21/2020    Procedure: COLONOSCOPY;  Surgeon: Tico Bell MD;  Location: [facility] ENDO (4TH FLR);  Service: Endoscopy;  Laterality: N/A;  ok to hold eliquis per Dr. Valadez, see telephone encounter 11/13/2020-MS  covid test 12/18 [place]    ECHOCARDIOGRAM,TRANSESOPHAGEAL N/A 2/3/2025    Procedure: Transesophageal echo (FELICIANO) intra-procedure log documentation;  Surgeon: Grayson Lin III, MD;  Location: [facility] EP LAB;  Service: Cardiology;  Laterality: N/A;    KIDNEY TRANSPLANT      TRANSESOPHAGEAL ECHOCARDIOGRAPHY N/A 8/26/2019    Procedure: ECHOCARDIOGRAM, TRANSESOPHAGEAL;  Surgeon: [facility] Provider;  Location: [facility] EP LAB;  Service: Cardiology;  Laterality: N/A;    TRANSESOPHAGEAL ECHOCARDIOGRAPHY N/A 6/11/2024    Procedure: ECHOCARDIOGRAM, TRANSESOPHAGEAL;  Surgeon: Grayson Lin III, MD;  Location: [facility] EP LAB;  Service: Cardiology;  Laterality: N/A;    TREATMENT OF CARDIAC ARRHYTHMIA N/A 8/26/2019    Procedure: CARDIOVERSION;  Surgeon: Bronson Bowden MD;  Location: [facility] EP LAB;  Service: Cardiology;  Laterality: N/A;  AF, FELICIANO, DCCV, MAC, GP, 3 PREP    TREATMENT OF CARDIAC ARRHYTHMIA N/A 2/3/2025    Procedure: Cardioversion or Defibrillation;  Surgeon: Bronson Bowden MD;  Location: [facility] EP LAB;  Service: Cardiology;  Laterality: N/A;  AF, FELICIANO, DCCV, MAC, GP, 3 Prep       Review of patient's allergies indicates:   Allergen Reactions    Nifedipine Other (See Comments)     Gingival hyperplasia       Current Facility-Administered Medications on File Prior to Encounter   Medication    balanced salt  irrigation intra-ocular solution 1 drop    epoetin tanya injection 4,000 Units    phenylephrine HCL 10% ophthalmic solution 1 drop    proparacaine 0.5 % ophthalmic solution 1 drop    sodium chloride 0.9% flush 10 mL    TETRAcaine HCl (PF) 0.5 % Drop 1 drop     Current Outpatient Medications on File Prior to Encounter   Medication Sig    atorvastatin (LIPITOR) 80 MG tablet Take 1 tablet (80 mg total) by mouth once daily.    cloNIDine 0.1 mg/24 hr td ptwk (CATAPRES) 0.1 mg/24 hr Place 1 patch onto the skin every 7 days.    doxazosin (CARDURA) 8 MG Tab Take 1 tablet (8 mg total) by mouth once daily.    empagliflozin (JARDIANCE) 10 mg tablet Take 1 tablet (10 mg total) by mouth once daily.    eplerenone (INSPRA) 50 MG Tab Take 1 tablet (50 mg total) by mouth once daily.    gabapentin (NEURONTIN) 300 MG capsule Take 1 capsule (300 mg total) by mouth 2 (two) times daily.    hydrALAZINE (APRESOLINE) 100 MG tablet Take 1 tablet (100 mg total) by mouth every 8 (eight) hours.    hydroCHLOROthiazide (HYDRODIURIL) 25 MG tablet Take 1 tablet (25 mg total) by mouth once daily.    insulin aspart U-100 (NOVOLOG U-100 INSULIN ASPART) 100 unit/mL injection Use in pump, max daily 100 units.    insulin  cart,aut,G6/7,cntr (OMNIPOD 5 G6-G7 INTRO KT,GEN5,) Crtg Use as directed.    magnesium oxide (MAG-OX) 400 mg (241.3 mg magnesium) tablet Take 1 tablet (400 mg total) by mouth 2 (two) times daily.    meclizine (ANTIVERT) 25 mg tablet Take 1 tablet (25 mg total) by mouth 3 (three) times daily as needed for Dizziness.    [Paused] mycophenolate (CELLCEPT) 250 mg Cap Take 2 capsules (500 mg total) by mouth 2 (two) times daily.    predniSONE (DELTASONE) 5 MG tablet Take 1 tablet (5 mg total) by mouth once daily.    rivaroxaban (XARELTO) 15 mg Tab Take 1 tablet (15 mg total) by mouth daily with dinner or evening meal.    tacrolimus (PROGRAF) 1 MG Cap Take 1 capsule (1 mg total) by mouth every 12 (twelve) hours.    valsartan (DIOVAN) 320 MG  "tablet Take 1 tablet (320 mg total) by mouth once daily.    blood sugar diagnostic Strp Use to check blood glucose 1 times daily, to use with insurance preferred meter    blood-glucose meter Misc Use to check blood glucose 1 times daily, to use with insurance preferred meter    blood-glucose sensor (DEXCOM G7 SENSOR) Kayla Change sensor every 10 days.    ergocalciferol (ERGOCALCIFEROL) 50,000 unit Cap Take 1 capsule (50,000 Units total) by mouth every 7 days. (Patient taking differently: Take 50,000 Units by mouth every 7 days. )    insulin pump cart,auto,BT,G6/7 (OMNIPOD 5 G6-G7 PODS, GEN 5,) Crtg Change every 48 hours.    Lactobacillus rhamnosus GG (CULTURELLE) 10 billion cell capsule Take 1 capsule by mouth once daily.    lancets 30 gauge Misc Use to check blood glucose 1 times daily, to use with insurance preferred meter    pen needle, diabetic (BD ULTRA-FINE LO PEN NEEDLE) 32 gauge x 5/32" Ndle USE TO ADMINISTER INSULIN 4 TIMES DAILY.    polyethylene glycol (MIRALAX) 17 gram PwPk Take 17 gm (mixed in liquid) by mouth every day.    [DISCONTINUED] insulin lispro (HUMALOG KWIKPEN INSULIN) 100 unit/mL pen Inject 18 units w/ breakfast, 16 units w/ lunch and dinner plus scale 150-200 +2, 201-250 +4, 251-300 +6, 301-350 +8.     Family History       Problem Relation (Age of Onset)    Cancer Brother    Diabetes Mother    Heart disease Mother, Father    Hypertension Mother, Brother          Tobacco Use    Smoking status: Former     Current packs/day: 0.00     Average packs/day: 0.5 packs/day for 32.0 years (16.0 ttl pk-yrs)     Types: Cigarettes     Start date: 1984     Quit date: 2016     Years since quittin.4    Smokeless tobacco: Never   Substance and Sexual Activity    Alcohol use: Yes     Comment: Pt reports occasional beers on Sundays. Pt reports drinking daily prior to ESRD diagnosis.    Drug use: No    Sexual activity: Yes     Partners: Female     Review of Systems   Constitutional:  " Positive for chills and fever.   HENT:  Negative for congestion.    Respiratory:  Positive for cough. Negative for shortness of breath.         Hemoptysis   Cardiovascular:  Negative for chest pain.   Gastrointestinal:  Negative for abdominal distention and abdominal pain.   Genitourinary:  Negative for dysuria.   Musculoskeletal:  Positive for back pain.   Neurological:  Negative for headaches.     Objective:     Vital Signs (Most Recent):  Temp: 98.5 °F (36.9 °C) (05/22/25 0940)  Pulse: 64 (05/22/25 1400)  Resp: 18 (05/22/25 1400)  BP: (!) 177/80 (05/22/25 1400)  SpO2: 97 % (05/22/25 1400) Vital Signs (24h Range):  Temp:  [98.5 °F (36.9 °C)] 98.5 °F (36.9 °C)  Pulse:  [64-80] 64  Resp:  [16-20] 18  SpO2:  [97 %] 97 %  BP: (137-177)/(63-80) 177/80     Weight: 90.7 kg (200 lb)  Body mass index is 26.39 kg/m².     Physical Exam  Constitutional:       General: He is not in acute distress.     Appearance: Normal appearance. He is not ill-appearing.   HENT:      Head: Normocephalic and atraumatic.      Nose: No congestion.   Eyes:      Extraocular Movements: Extraocular movements intact.      Conjunctiva/sclera: Conjunctivae normal.   Cardiovascular:      Rate and Rhythm: Normal rate. Rhythm irregular.      Pulses: Normal pulses.      Heart sounds: Normal heart sounds.   Pulmonary:      Effort: Pulmonary effort is normal. No respiratory distress.      Breath sounds: Rhonchi (Left) present.   Abdominal:      Palpations: Abdomen is soft.      Tenderness: There is no abdominal tenderness.   Musculoskeletal:      Right lower leg: Edema present.      Left lower leg: Edema present.   Skin:     General: Skin is warm.   Neurological:      General: No focal deficit present.      Mental Status: He is alert and oriented to person, place, and time.   Psychiatric:         Mood and Affect: Mood normal.         Thought Content: Thought content normal.                Significant Labs: All pertinent labs within the past 24 hours have  been reviewed.  CBC:   Recent Labs   Lab 05/22/25  1153 05/22/25  1216   WBC 12.64  --    HGB 9.0*  --    HCT 30.5* 33*   *  --      CMP:   Recent Labs   Lab 05/22/25  1153   *   K 4.1   CL 98   CO2 22*   GLU 80   BUN 55*   CREATININE 3.0*   CALCIUM 8.8   PROT 6.1   ALBUMIN 2.9*   BILITOT 0.6   ALKPHOS 54   AST 13   ALT <5*   ANIONGAP 12       Significant Imaging: I have reviewed all pertinent imaging results/findings within the past 24 hours.    CXR - Detrimental interval change in the appearance of the chest since 04/29/2025 is appreciated, as the current examination demonstrates interval development of some airspace consolidation in the left upper lobe. Continued demonstration of cardiomegaly. Airspace consolidation in the right lower lung zone seen on 04/29/2025 has resolved.

## 2025-05-22 NOTE — HPI
Ravi Zamorano is a 68yo male with a hx of CKD s/p kidney transplant (2016) on tacrolimus, afib (on xarelto), HTN, HLD, CHF, CVA who presented for evaluation of cough, fever, chills, mild hemoptysis, and lower back pain starting about 4am this morning. He ran 101F fever last night. Low back pain resolved with tylenol. His hemoptysis occurred 4 times this morning with about 1/2 teaspoon of blood each time. He denies any headache, vision changes, chest pain, abdominal pain, N/V/D, or dysuria. He has leg swelling at his baseline.     He was recently admitted 4/29-5/10 for pneumonia. Completed 7 day cefipime course on 5/5. At the time there was concern for aspiration pneumonia given his large hiatal hernia. FEES was performed but barium swallow was deferred.     In the ED VSS, afebrile. CXR notable for interval development of airspace consolidation of SUAD and resolution of RLL consolidation. Labs notable for HS troponin 89 (stable), BNP 1534 (stable), procal 1.82, UA noninfectious, Na 132, BUN 55, Cr 3.0, hgb 9.0 (stable), no leukocytosis.

## 2025-05-22 NOTE — CONSULTS
[facility] - Emergency Dept  Endocrinology  Diabetes Consult Note    Consult Requested by: Nina Gross MD   Reason for admit: Left upper lobe consolidation    HISTORY OF PRESENT ILLNESS:  Reason for Consult: Management of T2DM, Hyperglycemia      Diabetes diagnosis year: 2000      Home Diabetes Medications:    OMNIPOD 5  0.6 u/hr mn-0600 , 9p-mn, 0.7 u/hr 6a-9p   Target 120-130 mg/dl   Iob 3 hours  Max bolus 20 units   Max basal 2 u/hr   Isf 40 mn-mn  ICR 1 to 7.5      Presets:  Snack: 4 units (28g)  Small Meal: 8 units (56g)  Medium Meal: 12 units (90g)  Large Meal: 15 units (114g)  Super Large Meal: 18 units (130g)     How often checking glucose at home? >4 x day   BG readings on regimen: 150s  Hypoglycemia on the regimen?  No  Missed doses on regimen?  No     Diabetes Complications include:     Hyperglycemia     Complicating diabetes co morbidities:   S/p Kidney tx 2016         HPI: his is a 69-year-old male with a history of DM, renal transplant 2016, CHFpEF, AF/AFL (PVI/PWI/CTI 6/2024), CVA, recurrent PNA who presents with fevers, chills. States that symptoms are similar to last month when he was admitted for pneumonia. Was in his usual state of health until last night when he started experiencing intermittent fevers, chills. Received IV vancomycin, azithromycin, Zosyn.  Septic workup obtained. Endocrine conulsted to manage hyperglycemia, type 2 diabetes, and insulin pump therapy.    Lab Results   Component Value Date    HGBA1C 8.3 (H) 03/27/2025         Interval HPI:   Overnight events: Patient in room ED 13/13. Blood glucose stable. BG at goal on current insulin regimen (Home Insulin Pump). Steroid use- None.    Renal function- Abnormal - Creatinine 3.0   Vasopressors-  None       Endocrine will continue to follow and manage insulin orders inpatient.         No diet orders on file     Eating:   NPO  Nausea: No  Hypoglycemia and intervention: No  Fever: No  TPN and/or TF: No      PMH, PSH, FH, SH  updated and reviewed     ROS:  Review of Systems   Constitutional:  Negative for unexpected weight change.   Eyes:  Negative for visual disturbance.   Respiratory:  Negative for cough.    Gastrointestinal:  Negative for nausea and vomiting.   Endocrine: Negative for polydipsia and polyuria.   Musculoskeletal:  Negative for back pain.   Skin:  Negative for rash.   Neurological:  Negative for syncope.   Psychiatric/Behavioral:  Negative for agitation and dysphoric mood.        Current Medications and/or Treatments Impacting Glycemic Control  Immunotherapy:    Immunosuppressants       None          Steroids:   Hormones (From admission, onward)      None          Pressors:    Autonomic Drugs (From admission, onward)      None          Hyperglycemia/Diabetes Medications:   Antihyperglycemics (From admission, onward)      Start     Stop Route Frequency Ordered    05/22/25 1530  empagliflozin (Jardiance) tablet 10 mg        Question Answer Comment   Does this patient have a diagnosis of heart failure? Yes    Does this patient have type 1 diabetes or diabetic ketoacidosis? No    Does this patient have symptomatic hypotension? No    Is the patient NPO or pending major surgery in next 3 days or less? No        -- Oral Daily 05/22/25 1425             PHYSICAL EXAMINATION:  Vitals:    05/22/25 1400   BP: (!) 177/80   Pulse: 64   Resp:    Temp:      Body mass index is 26.39 kg/m².     Physical Exam  Constitutional:       Appearance: He is well-developed.   HENT:      Head: Normocephalic.   Eyes:      Conjunctiva/sclera: Conjunctivae normal.   Pulmonary:      Effort: Pulmonary effort is normal.   Musculoskeletal:         General: Normal range of motion.   Skin:     General: Skin is warm.      Findings: No rash.   Neurological:      Mental Status: He is alert and oriented to person, place, and time.            Labs Reviewed and Include   Recent Labs   Lab 05/22/25  1153   GLU 80   CALCIUM 8.8   ALBUMIN 2.9*   PROT 6.1   *  "  K 4.1   CO2 22*   CL 98   BUN 55*   CREATININE 3.0*   ALKPHOS 54   ALT <5*   AST 13   BILITOT 0.6     Lab Results   Component Value Date    WBC 12.64 05/22/2025    HGB 9.0 (L) 05/22/2025    HCT 33 (L) 05/22/2025    MCV 76 (L) 05/22/2025     (L) 05/22/2025     No results for input(s): "TSH", "FREET4" in the last 168 hours.  Lab Results   Component Value Date    HGBA1C 8.3 (H) 03/27/2025       Nutritional status:   Body mass index is 26.39 kg/m².  Lab Results   Component Value Date    ALBUMIN 2.9 (L) 05/22/2025    ALBUMIN 2.6 (L) 05/03/2025    ALBUMIN 2.8 (L) 04/29/2025     No results found for: "PREALBUMIN"    Estimated Creatinine Clearance: 27 mL/min (A) (based on SCr of 3 mg/dL (H)).    Accu-Checks  No results for input(s): "POCTGLUCOSE" in the last 72 hours.     ASSESSMENT and PLAN    Pulmonary  * Left upper lobe consolidation  Infection may elevate BG readings  On IV antibiotics  Managed per primary team  Avoid hypoglycemia        Endocrine  Insulin pump in place  At time of evaluation, pt meets criteria to continue home insulin pump usage.  - Has all adequate supplies   - Insulin pump site change on 05.20.2025.    - Bolus settings reviewed    - No changes to home regimen.   - Nurse to check BG qac/hs/0200 & record in epic   - Patient to input glucose into pump and use bolus wizard for prandial needs   - Will continue to monitor accuchecks and titrate insulin as clinically indicated .     - Discussed above plan with patient, patient verbalized understanding.   - Understands in case of pump malfunction or cognitive decline in which pt can no longer safely use insulin pump, will transition to SC MDI        If pump malfunctions or is disconnected, PLEASE CALL ENDOCRINE ON-CALL.        Type 2 diabetes mellitus with stage 4 chronic kidney disease, with long-term current use of insulin  Endocrinology consulted for BG management.   BG goal 140-180    - Home Insulin Pump  - BG checks AC/HS/0200  - Hypoglycemia " protocol in place  - If blood glucose greater than 300, please ask patient not to eat food or drink anything other than water until correctional insulin has brought it back below 250    ** Please notify Endocrine for any change and/or advance in diet**  ** Please call Endocrine for any BG related issues **    Discharge Planning:   TBD. Please notify endocrinology prior to discharge.          Plan discussed with patient, family, and RN at bedside.        Cecil Villalpando, DNP, FNP  Endocrinology  [facility] - Emergency Dept

## 2025-05-23 LAB
ABSOLUTE EOSINOPHIL (OHS): 0.01 K/UL
ABSOLUTE MONOCYTE (OHS): 1.23 K/UL (ref 0.3–1)
ABSOLUTE NEUTROPHIL COUNT (OHS): 10.23 K/UL (ref 1.8–7.7)
ALBUMIN SERPL BCP-MCNC: 2.6 G/DL (ref 3.5–5.2)
ALP SERPL-CCNC: 52 UNIT/L (ref 40–150)
ALT SERPL W/O P-5'-P-CCNC: <5 UNIT/L (ref 10–44)
ANION GAP (OHS): 12 MMOL/L (ref 8–16)
AORTIC SIZE INDEX (SOV): 1.4 CM/M2
AST SERPL-CCNC: 14 UNIT/L (ref 11–45)
AV AREA BY CONTINUOUS VTI: 1.8 CM2
AV INDEX (PROSTH): 0.55
AV LVOT MEAN GRADIENT: 1 MMHG
AV LVOT PEAK GRADIENT: 2 MMHG
AV MEAN GRADIENT: 4 MMHG
AV PEAK GRADIENT: 6 MMHG
AV VALVE AREA BY VELOCITY RATIO: 1.8 CM²
AV VALVE AREA: 1.7 CM2
AV VELOCITY RATIO: 0.58
BASOPHILS # BLD AUTO: 0.03 K/UL
BASOPHILS NFR BLD AUTO: 0.2 %
BILIRUB SERPL-MCNC: 0.8 MG/DL (ref 0.1–1)
BSA FOR ECHO PROCEDURE: 2.16 M2
BUN SERPL-MCNC: 60 MG/DL (ref 8–23)
CALCIUM SERPL-MCNC: 8.4 MG/DL (ref 8.7–10.5)
CHLORIDE SERPL-SCNC: 98 MMOL/L (ref 95–110)
CO2 SERPL-SCNC: 20 MMOL/L (ref 23–29)
CREAT SERPL-MCNC: 3.1 MG/DL (ref 0.5–1.4)
CV ECHO LV RWT: 0.57 CM
DOP CALC AO PEAK VEL: 1.2 M/S
DOP CALC AO VTI: 21.1 CM
DOP CALC LVOT AREA: 3.1 CM2
DOP CALC LVOT DIAMETER: 2 CM
DOP CALC LVOT PEAK VEL: 0.7 M/S
DOP CALC LVOT STROKE VOLUME: 36.1 CM3
DOP CALCLVOT PEAK VEL VTI: 11.5 CM
E WAVE DECELERATION TIME: 196 MS
E WAVE DECELERATION TIME: 202 MS
E/A RATIO: 4.59
E/E' RATIO: 13 M/S
ECHO EF ESTIMATED: 47 %
ECHO LV POSTERIOR WALL: 1.5 CM (ref 0.6–1.1)
EJECTION FRACTION: 48 %
ERYTHROCYTE [DISTWIDTH] IN BLOOD BY AUTOMATED COUNT: 19.1 % (ref 11.5–14.5)
FRACTIONAL SHORTENING: 22.6 % (ref 28–44)
GFR SERPLBLD CREATININE-BSD FMLA CKD-EPI: 21 ML/MIN/1.73/M2
GLUCOSE SERPL-MCNC: 129 MG/DL (ref 70–110)
HCT VFR BLD AUTO: 28.7 % (ref 40–54)
HGB BLD-MCNC: 8.7 GM/DL (ref 14–18)
IGA SERPL-MCNC: 159 MG/DL (ref 40–350)
IGG SERPL-MCNC: 1185 MG/DL (ref 650–1600)
IGM SERPL-MCNC: 68 MG/DL (ref 50–300)
IMM GRANULOCYTES # BLD AUTO: 0.1 K/UL (ref 0–0.04)
IMM GRANULOCYTES NFR BLD AUTO: 0.8 % (ref 0–0.5)
INTERVENTRICULAR SEPTUM: 1.3 CM (ref 0.6–1.1)
LA MAJOR: 7.7 CM
LA MINOR: 7 CM
LA WIDTH: 4.8 CM
LEFT ATRIUM SIZE: 4 CM
LEFT ATRIUM VOLUME INDEX: 56 ML/M2
LEFT ATRIUM VOLUME: 120 CM3
LEFT INTERNAL DIMENSION IN SYSTOLE: 4.1 CM (ref 2.1–4)
LEFT VENTRICLE DIASTOLIC VOLUME INDEX: 62.79 ML/M2
LEFT VENTRICLE DIASTOLIC VOLUME: 135 ML
LEFT VENTRICLE MASS INDEX: 148.3 G/M2
LEFT VENTRICLE SYSTOLIC VOLUME INDEX: 33.5 ML/M2
LEFT VENTRICLE SYSTOLIC VOLUME: 72 ML
LEFT VENTRICULAR INTERNAL DIMENSION IN DIASTOLE: 5.3 CM (ref 3.5–6)
LEFT VENTRICULAR MASS: 318.9 G
LV LATERAL E/E' RATIO: 10.1
LV SEPTAL E/E' RATIO: 16.8
LYMPHOCYTES # BLD AUTO: 1.17 K/UL (ref 1–4.8)
MAGNESIUM SERPL-MCNC: 2.2 MG/DL (ref 1.6–2.6)
MCH RBC QN AUTO: 22.6 PG (ref 27–31)
MCHC RBC AUTO-ENTMCNC: 30.3 G/DL (ref 32–36)
MCV RBC AUTO: 75 FL (ref 82–98)
MRSA PCR SCRN (OHS): NOT DETECTED
MV PEAK A VEL: 0.22 M/S
MV PEAK E VEL: 1.01 M/S
NUCLEATED RBC (/100WBC) (OHS): 0 /100 WBC
OHS CV RV/LV RATIO: 0.7 CM
OHS LV EJECTION FRACTION SIMPSONS BIPLANE MOD: 45 %
PHOSPHATE SERPL-MCNC: 3.1 MG/DL (ref 2.7–4.5)
PISA TR MAX VEL: 2.8 M/S
PLATELET # BLD AUTO: 123 K/UL (ref 150–450)
PMV BLD AUTO: ABNORMAL FL
POCT GLUCOSE: 131 MG/DL (ref 70–110)
POCT GLUCOSE: 170 MG/DL (ref 70–110)
POCT GLUCOSE: 180 MG/DL (ref 70–110)
POCT GLUCOSE: 200 MG/DL (ref 70–110)
POTASSIUM SERPL-SCNC: 4.6 MMOL/L (ref 3.5–5.1)
PROT SERPL-MCNC: 5.7 GM/DL (ref 6–8.4)
RA MAJOR: 5.37 CM
RA PRESSURE ESTIMATED: 15 MMHG
RA WIDTH: 3.86 CM
RBC # BLD AUTO: 3.85 M/UL (ref 4.6–6.2)
RELATIVE EOSINOPHIL (OHS): 0.1 %
RELATIVE LYMPHOCYTE (OHS): 9.2 % (ref 18–48)
RELATIVE MONOCYTE (OHS): 9.6 % (ref 4–15)
RELATIVE NEUTROPHIL (OHS): 80.1 % (ref 38–73)
RIGHT VENTRICLE DIASTOLIC BASEL DIMENSION: 3.7 CM
RV TB RVSP: 18 MMHG
SINUS: 3.07 CM
SODIUM SERPL-SCNC: 130 MMOL/L (ref 136–145)
STJ: 2.6 CM
TACROLIMUS BLD-MCNC: 5.4 NG/ML (ref 5–15)
TDI LATERAL: 0.1 M/S
TDI SEPTAL: 0.06 M/S
TDI: 0.08 M/S
TRICUSPID ANNULAR PLANE SYSTOLIC EXCURSION: 2 CM
TV PEAK GRADIENT: 31 MMHG
TV REST PULMONARY ARTERY PRESSURE: 46 MMHG
WBC # BLD AUTO: 12.77 K/UL (ref 3.9–12.7)
Z-SCORE OF LEFT VENTRICULAR DIMENSION IN END DIASTOLE: -2.75
Z-SCORE OF LEFT VENTRICULAR DIMENSION IN END SYSTOLE: -0.29

## 2025-05-23 PROCEDURE — 80053 COMPREHEN METABOLIC PANEL: CPT | Mod: HCNC

## 2025-05-23 PROCEDURE — 80197 ASSAY OF TACROLIMUS: CPT | Mod: HCNC

## 2025-05-23 PROCEDURE — 25000003 PHARM REV CODE 250: Mod: HCNC

## 2025-05-23 PROCEDURE — 25000003 PHARM REV CODE 250: Mod: HCNC | Performed by: STUDENT IN AN ORGANIZED HEALTH CARE EDUCATION/TRAINING PROGRAM

## 2025-05-23 PROCEDURE — 87449 NOS EACH ORGANISM AG IA: CPT | Mod: HCNC

## 2025-05-23 PROCEDURE — 99232 SBSQ HOSP IP/OBS MODERATE 35: CPT | Mod: HCNC,GC,, | Performed by: INTERNAL MEDICINE

## 2025-05-23 PROCEDURE — 83735 ASSAY OF MAGNESIUM: CPT | Mod: HCNC

## 2025-05-23 PROCEDURE — 94761 N-INVAS EAR/PLS OXIMETRY MLT: CPT | Mod: HCNC

## 2025-05-23 PROCEDURE — 63700000 PHARM REV CODE 250 ALT 637 W/O HCPCS: Mod: HCNC

## 2025-05-23 PROCEDURE — 63600175 PHARM REV CODE 636 W HCPCS: Mod: HCNC

## 2025-05-23 PROCEDURE — 86480 TB TEST CELL IMMUN MEASURE: CPT | Mod: HCNC | Performed by: INTERNAL MEDICINE

## 2025-05-23 PROCEDURE — 85025 COMPLETE CBC W/AUTO DIFF WBC: CPT | Mod: HCNC

## 2025-05-23 PROCEDURE — 36415 COLL VENOUS BLD VENIPUNCTURE: CPT | Mod: HCNC

## 2025-05-23 PROCEDURE — 99222 1ST HOSP IP/OBS MODERATE 55: CPT | Mod: HCNC,GC,, | Performed by: INTERNAL MEDICINE

## 2025-05-23 PROCEDURE — 99232 SBSQ HOSP IP/OBS MODERATE 35: CPT | Mod: HCNC,,, | Performed by: PHYSICIAN ASSISTANT

## 2025-05-23 PROCEDURE — 21400001 HC TELEMETRY ROOM: Mod: HCNC

## 2025-05-23 PROCEDURE — 87641 MR-STAPH DNA AMP PROBE: CPT | Mod: HCNC

## 2025-05-23 PROCEDURE — 27000207 HC ISOLATION: Mod: HCNC

## 2025-05-23 PROCEDURE — 82784 ASSAY IGA/IGD/IGG/IGM EACH: CPT | Mod: HCNC | Performed by: STUDENT IN AN ORGANIZED HEALTH CARE EDUCATION/TRAINING PROGRAM

## 2025-05-23 PROCEDURE — 84100 ASSAY OF PHOSPHORUS: CPT | Mod: HCNC

## 2025-05-23 PROCEDURE — 92610 EVALUATE SWALLOWING FUNCTION: CPT | Mod: HCNC

## 2025-05-23 PROCEDURE — 99233 SBSQ HOSP IP/OBS HIGH 50: CPT | Mod: HCNC,,, | Performed by: INTERNAL MEDICINE

## 2025-05-23 PROCEDURE — 36415 COLL VENOUS BLD VENIPUNCTURE: CPT | Mod: HCNC | Performed by: INTERNAL MEDICINE

## 2025-05-23 PROCEDURE — 63600175 PHARM REV CODE 636 W HCPCS: Mod: HCNC | Performed by: STUDENT IN AN ORGANIZED HEALTH CARE EDUCATION/TRAINING PROGRAM

## 2025-05-23 RX ORDER — AZITHROMYCIN 250 MG/1
250 TABLET, FILM COATED ORAL DAILY
Status: DISCONTINUED | OUTPATIENT
Start: 2025-05-23 | End: 2025-05-23

## 2025-05-23 RX ORDER — AZITHROMYCIN 250 MG/1
250 TABLET, FILM COATED ORAL ONCE
Status: DISCONTINUED | OUTPATIENT
Start: 2025-05-23 | End: 2025-05-23

## 2025-05-23 RX ORDER — LEVOFLOXACIN 750 MG/1
750 TABLET, FILM COATED ORAL DAILY
Status: DISCONTINUED | OUTPATIENT
Start: 2025-05-24 | End: 2025-05-24

## 2025-05-23 RX ORDER — GABAPENTIN 100 MG/1
100 CAPSULE ORAL 3 TIMES DAILY
Status: DISCONTINUED | OUTPATIENT
Start: 2025-05-23 | End: 2025-05-25 | Stop reason: HOSPADM

## 2025-05-23 RX ORDER — GABAPENTIN 300 MG/1
300 CAPSULE ORAL 3 TIMES DAILY
Status: DISCONTINUED | OUTPATIENT
Start: 2025-05-23 | End: 2025-05-23

## 2025-05-23 RX ORDER — ERGOCALCIFEROL 1.25 MG/1
50000 CAPSULE ORAL
Status: DISCONTINUED | OUTPATIENT
Start: 2025-05-23 | End: 2025-05-25 | Stop reason: HOSPADM

## 2025-05-23 RX ORDER — GABAPENTIN 100 MG/1
200 CAPSULE ORAL NIGHTLY
Status: DISCONTINUED | OUTPATIENT
Start: 2025-05-23 | End: 2025-05-25 | Stop reason: HOSPADM

## 2025-05-23 RX ORDER — FUROSEMIDE 10 MG/ML
60 INJECTION INTRAMUSCULAR; INTRAVENOUS ONCE
Status: COMPLETED | OUTPATIENT
Start: 2025-05-23 | End: 2025-05-23

## 2025-05-23 RX ADMIN — ERGOCALCIFEROL 50000 UNITS: 1.25 CAPSULE ORAL at 04:05

## 2025-05-23 RX ADMIN — GABAPENTIN 100 MG: 100 CAPSULE ORAL at 08:05

## 2025-05-23 RX ADMIN — ATORVASTATIN CALCIUM 80 MG: 40 TABLET, FILM COATED ORAL at 08:05

## 2025-05-23 RX ADMIN — FUROSEMIDE 60 MG: 10 INJECTION, SOLUTION INTRAMUSCULAR; INTRAVENOUS at 01:05

## 2025-05-23 RX ADMIN — POLYETHYLENE GLYCOL 3350 17 G: 17 POWDER, FOR SOLUTION ORAL at 08:05

## 2025-05-23 RX ADMIN — VANCOMYCIN HYDROCHLORIDE 750 MG: 750 INJECTION, POWDER, LYOPHILIZED, FOR SOLUTION INTRAVENOUS at 05:05

## 2025-05-23 RX ADMIN — GABAPENTIN 300 MG: 300 CAPSULE ORAL at 12:05

## 2025-05-23 RX ADMIN — AZITHROMYCIN DIHYDRATE 250 MG: 250 TABLET ORAL at 12:05

## 2025-05-23 RX ADMIN — GABAPENTIN 100 MG: 100 CAPSULE ORAL at 10:05

## 2025-05-23 RX ADMIN — TACROLIMUS 1 MG: 1 CAPSULE ORAL at 05:05

## 2025-05-23 RX ADMIN — HYDRALAZINE HYDROCHLORIDE 100 MG: 25 TABLET ORAL at 01:05

## 2025-05-23 RX ADMIN — RIVAROXABAN 15 MG: 10 TABLET, FILM COATED ORAL at 04:05

## 2025-05-23 RX ADMIN — HYDRALAZINE HYDROCHLORIDE 100 MG: 25 TABLET ORAL at 05:05

## 2025-05-23 RX ADMIN — HYDRALAZINE HYDROCHLORIDE 100 MG: 25 TABLET ORAL at 10:05

## 2025-05-23 RX ADMIN — CEFEPIME 1 G: 1 INJECTION, POWDER, FOR SOLUTION INTRAMUSCULAR; INTRAVENOUS at 12:05

## 2025-05-23 RX ADMIN — TACROLIMUS 1 MG: 1 CAPSULE ORAL at 08:05

## 2025-05-23 RX ADMIN — GABAPENTIN 200 MG: 100 CAPSULE ORAL at 10:05

## 2025-05-23 RX ADMIN — DOXAZOSIN 8 MG: 2 TABLET ORAL at 08:05

## 2025-05-23 RX ADMIN — PREDNISONE 5 MG: 5 TABLET ORAL at 08:05

## 2025-05-23 RX ADMIN — EMPAGLIFLOZIN 10 MG: 10 TABLET, FILM COATED ORAL at 08:05

## 2025-05-23 RX ADMIN — GABAPENTIN 100 MG: 100 CAPSULE ORAL at 04:05

## 2025-05-23 NOTE — SUBJECTIVE & OBJECTIVE
Interval History: NAEO. Remains asymptomatic. Persistent leukocytosis. Echo with CVP 15, will diurese. Transplant ID consulted who recommend Pulm eval with bronch.     Review of Systems   Constitutional:  Negative for fever.   HENT:  Negative for congestion.    Respiratory:  Negative for cough and shortness of breath.         Hemoptysis   Cardiovascular:  Negative for chest pain.   Gastrointestinal:  Negative for abdominal distention and abdominal pain.   Genitourinary:  Negative for dysuria.   Musculoskeletal:  Negative for back pain.   Neurological:  Negative for headaches.     Objective:     Vital Signs (Most Recent):  Temp: 98.4 °F (36.9 °C) (05/23/25 1149)  Pulse: 63 (05/23/25 1149)  Resp: 18 (05/23/25 1149)  BP: (!) 169/80 (05/23/25 1149)  SpO2: (!) 93 % (05/23/25 1149) Vital Signs (24h Range):  Temp:  [98.2 °F (36.8 °C)-99.9 °F (37.7 °C)] 98.4 °F (36.9 °C)  Pulse:  [63-72] 63  Resp:  [14-20] 18  SpO2:  [93 %-99 %] 93 %  BP: (148-194)/(68-89) 169/80     Weight: 90.7 kg (200 lb)  Body mass index is 26.39 kg/m².    Intake/Output Summary (Last 24 hours) at 5/23/2025 1306  Last data filed at 5/23/2025 0630  Gross per 24 hour   Intake 848.52 ml   Output 325 ml   Net 523.52 ml         Physical Exam  Constitutional:       General: He is not in acute distress.     Appearance: Normal appearance. He is not ill-appearing.   HENT:      Head: Normocephalic and atraumatic.      Nose: No congestion.   Eyes:      Extraocular Movements: Extraocular movements intact.      Conjunctiva/sclera: Conjunctivae normal.   Cardiovascular:      Rate and Rhythm: Normal rate. Rhythm irregular.      Pulses: Normal pulses.      Heart sounds: Normal heart sounds.   Pulmonary:      Effort: Pulmonary effort is normal. No respiratory distress.      Breath sounds: Rhonchi (Left) present.   Abdominal:      General: There is no distension.      Palpations: Abdomen is soft.      Tenderness: There is no abdominal tenderness.   Musculoskeletal:       Right lower leg: Edema present.      Left lower leg: Edema present.   Skin:     General: Skin is warm.   Neurological:      General: No focal deficit present.      Mental Status: He is alert and oriented to person, place, and time.   Psychiatric:         Mood and Affect: Mood normal.         Thought Content: Thought content normal.               Significant Labs: All pertinent labs within the past 24 hours have been reviewed.  CBC:   Recent Labs   Lab 05/22/25  1153 05/22/25  1216 05/23/25  0308   WBC 12.64  --  12.77*   HGB 9.0*  --  8.7*   HCT 30.5* 33* 28.7*   *  --  123*     CMP:   Recent Labs   Lab 05/22/25  1153 05/23/25  0309   * 130*   K 4.1 4.6   CL 98 98   CO2 22* 20*   GLU 80 129*   BUN 55* 60*   CREATININE 3.0* 3.1*   CALCIUM 8.8 8.4*   PROT 6.1 5.7*   ALBUMIN 2.9* 2.6*   BILITOT 0.6 0.8   ALKPHOS 54 52   AST 13 14   ALT <5* <5*   ANIONGAP 12 12       Significant Imaging: I have reviewed all pertinent imaging results/findings within the past 24 hours.    Echo  Result Date: 5/23/2025    Left Ventricle: The left ventricle is normal in size. Moderately   increased ventricular mass. Increased wall thickness. Mild septal   thickening. There is moderate concentric hypertrophy. Mild global   hypokinesis and regional wall motion abnormalities present. See diagram   for wall motion findings. There is mildly reduced systolic function with a   visually estimated ejection fraction of 45 - 50%. Ejection fraction is   approximately 48%. Quantitated ejection fraction is 45%. There is   indeterminate diastolic function.    Right Ventricle: The right ventricle is normal in size Wall thickness   is normal. Systolic function is normal.    Left Atrium: The left atrium is severely dilated    Aortic Valve: There is mild aortic valve sclerosis. There is mild to   mild-moderate aortic regurgitation.    Mitral Valve: There is mild regurgitation.    Tricuspid Valve: There is mild regurgitation.    Pulmonary Artery:  There is mild pulmonary hypertension. The estimated   pulmonary artery systolic pressure is 46 mmHg.    IVC/SVC: Elevated venous pressure at 15 mmHg.

## 2025-05-23 NOTE — PLAN OF CARE
05/23/25 1649   Readmission   Was this a planned readmission? No   Why were you hospitalized in the last 30 days? PNA   Why were you readmitted? Related to previous admission   When you left the hospital how did you feel? good   When you left the hospital where did you go? Home with Family   Did patient/caregiver refused recommended DC plan? No   Tell me about what happened between when you left the hospital and the day you returned. coughing   When did you start not feeling well? 1-2 days   Did you try to manage your symptoms your self? Yes   What did you do? rest   Did you call anyone? No   Why? unsure   Did you try to see or did see a doctor or nurse before you came? Yes   Were you seen? Yes   Did you have  a follow-up appointment on discharge? Yes   Did you go? Yes

## 2025-05-23 NOTE — PLAN OF CARE
Problem: Adult Inpatient Plan of Care  Goal: Patient-Specific Goal (Individualized)  Outcome: Progressing  Goal: Optimal Comfort and Wellbeing  Outcome: Progressing     Problem: Diabetes Comorbidity  Goal: Blood Glucose Level Within Targeted Range  Outcome: Progressing     Problem: Acute Kidney Injury/Impairment  Goal: Fluid and Electrolyte Balance  Outcome: Progressing  Goal: Effective Renal Function  Outcome: Progressing

## 2025-05-23 NOTE — ASSESSMENT & PLAN NOTE
Patient has Combined Systolic and Diastolic heart failure that is Chronic. On presentation their CHF was well compensated. Most recent BNP and echo results are listed below.  Recent Labs     05/22/25  1153   BNP 1,534*     Echo  Result Date: 5/23/2025    Left Ventricle: The left ventricle is normal in size. Moderately   increased ventricular mass. Increased wall thickness. Mild septal   thickening. There is moderate concentric hypertrophy. Mild global   hypokinesis and regional wall motion abnormalities present. See diagram   for wall motion findings. There is mildly reduced systolic function with a   visually estimated ejection fraction of 45 - 50%. Ejection fraction is   approximately 48%. Quantitated ejection fraction is 45%. There is   indeterminate diastolic function.    Right Ventricle: The right ventricle is normal in size Wall thickness   is normal. Systolic function is normal.    Left Atrium: The left atrium is severely dilated    Aortic Valve: There is mild aortic valve sclerosis. There is mild to   mild-moderate aortic regurgitation.    Mitral Valve: There is mild regurgitation.    Tricuspid Valve: There is mild regurgitation.    Pulmonary Artery: There is mild pulmonary hypertension. The estimated   pulmonary artery systolic pressure is 46 mmHg.    IVC/SVC: Elevated venous pressure at 15 mmHg.        Current Heart Failure Medications  hydrALAZINE tablet 100 mg, Every 8 hours, Oral  empagliflozin (Jardiance) tablet 10 mg, Daily, Oral    BNP 1500, similar to prior admission    Plan  - Monitor strict I&Os and daily weights.    - Place on telemetry  - Low sodium diet  - Place on fluid restriction of 1.5 L.   - Will trial IV lasix 60mg and monitor UOP

## 2025-05-23 NOTE — ASSESSMENT & PLAN NOTE
Creatine stable for now. BMP reviewed- noted Estimated Creatinine Clearance: 26.1 mL/min (A) (based on SCr of 3.1 mg/dL (H)). according to latest data. Based on current GFR, CKD stage is stage 4 - GFR 15-29.  Monitor UOP and serial BMP and adjust therapy as needed. Renally dose meds. Avoid nephrotoxic medications and procedures.    Plan:  -- has home insulin pump  -- Consult endocrine  -- POC glucose ACHS

## 2025-05-23 NOTE — ASSESSMENT & PLAN NOTE
Has hx of recurrent pneumonia with multiple hospitalizations, most recently discharged 5/10/25 for RLL pneumonia. Presented to [facility] [place] with cough, subjective fevers/chills, hemoptysis, and new SUAD consolidation seen on Xray. Initial labs with borderline leukocytosis (12.6k), Na 132. No recent respiratory cultures. Negative resp infection panels in March and early May 2025. Legionella antigen negative 5/2/25.     SLP contacted this admission who state no need for barium swallow but if we're concerned for aspiration to contact GI. Will defer for now. CT chest 5/23 with L upper lobe consolidation and resolution of RLL consolidation. Transplant ID consulted who recommend bronch with pulm.       Plan:  -- TB r/o given immunosuppressed and recurrent pna   -- continue vanc/cefepime given recent hospitalization   -- azithro added, pending MRSA nares  -- f/u respiratory  culture  -- f/u blood cultures  -- f/u with transplant ID and pulm

## 2025-05-23 NOTE — ASSESSMENT & PLAN NOTE
Patient has permanent atrial fibrillation. Patient is currently in atrial fibrillation. [id number] Score: 3. The patients heart rate in the last 24 hours is as follows:  Pulse  Min: 63  Max: 72     Antiarrhythmics       Anticoagulants  rivaroxaban tablet 15 mg, With dinner, Oral    Plan  - Replete lytes with a goal of K>4, Mg >2  - Patient is anticoagulated, see medications listed above.  - Patient's afib is currently controlled

## 2025-05-23 NOTE — ASSESSMENT & PLAN NOTE
Has hx of recurrent pneumonia with multiple hospitalizations, most recently discharged 5/10/25 for RLL pneumonia. Presented to [facility] [place] ED with cough, subjective fevers/chills, hemoptysis, and new SUAD consolidation seen on Xray. Initial labs with borderline leukocytosis (12.6k), Na 132. No recent respiratory cultures. Negative resp infection panels in March and early May 2025. Legionella antigen negative 5/2/25.     SLP contacted this admission who state no need for barium swallow but if we're concerned for aspiration to contact GI. Will defer for now. CT chest 5/23 with L upper lobe consolidation and resolution of RLL consolidation. Transplant ID consulted who recommend bronch with pulm.       Plan:  -- TB r/o given immunosuppressed and recurrent pna   -- continue vanc/cefepime given recent hospitalization   -- azithro added, pending MRSA nares  -- f/u respiratory  culture  -- f/u blood cultures  -- f/u with transplant ID and pulm

## 2025-05-23 NOTE — HOSPITAL COURSE
Respiratory status stable. CT chest with left upper lobe consolidation and resolution of right lower lobe consolidation. Transplant ID consulted who recommend pulmonology evaluation with bronchoscopy. Pulmonology will follow up outpatient. Transitioned from cefepime to levofloxacin. KTM consulted, determined current cr to likely be PHUONG on CKD. Echo obtained to assess fluid status. CVP 15. EF 50% with in determinant diastolic function. Pt states he takes home lasix 80mg PO daily. Diuresis given. At consultation with nephrology, pt appears to be at new renal baseline. Currently asymptomatic. He is stable for discharge home with close outpatient follow up with nephrology, pulmonology, and primary care.

## 2025-05-23 NOTE — CONSULTS
[facility]monary & Critical [facility] Consult Note    Primary Attending Physician: Dr. Cavanaugh  Consultant Attending: Dr. Choudhary  Consultant Fellow: Dr. Rodriguez     Reason for Consult:     Transplant ID recommending bronch, recurrent admissions for pna     Subjective:      History of Present Illness:  Ravi Zamorano is a 69 y.o. male with h/o deceseased donor kidney transplant (2016, on MMF, tacro, prednisone 5mg), Afib on xarelto, T2DM, HTN, HLD, CVA, HFpEF, and h/o latent TB s/p treatment (2014?), who presented to the [facility] ED on 5/22/2025 with a primary complaint of Multiple complaints  (Fever, chills, body aches, coughing up blood, kidney transplant pt 2016)  He was admitted for recurrent PNA and started on abx. Per report, ID consulted with possible bronch requested.     Last admitted from 4/29/25-5/8 and tx with cefepime for R sided PNA noted on CT chest. Got tx with cefepime. Resp infection panel at that time negative but no resp cultures collected. He went home feeling better. Re-presented with fever to 101 and worsening cough with some scant hemoptysis.     Of note, has had prior positive quant gold as he was exposed to family member with TB in the past (around 10 years old). Saw ID in 2014 and was rx therapy for latent TB but he can't remember that appointment.    He is feeling better overall and denies coughing up sputum right now. No further fevers. He has had some scant hemoptysis several times in the past when he has been sick with pna. He does have a remote smoking history, 1/2 PPD for 40 years, quit 10 years ago. No significant weight loss. No night sweats.       Past Medical History:  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    CKD (chronic kidney disease) stage 4, GFR 15-29 ml/min 07/29/2014    Colon polyps 2014    Depression -  situational 07/29/2014    Diabetes type 2 since 2000 07/29/2014    Diabetic neuropathy 07/29/2014    Encounter for blood transfusion     History of cardioversion 10/03/2019    History of hepatitis C, s/p successful Rx w/ SVR24 - 9/2017 07/29/2014    Genotype 1a, treatment naive 10/2014 liver biopsy - grade 1 / stage 1 Completed Harvoni w/ SVR    Hyperlipidemia 07/29/2014    Hypertension     Neuropathy     Organ transplant candidate 07/29/2014    Pancreatitis     S/P cadaveric kidney transplant 11/5/2016. ESRD secondary to HTN/DMII 11/05/2016    Type 2 diabetes mellitus with stage 3a chronic kidney disease, with long-term current use of insulin 07/29/2014       Past Surgical History:  Past Surgical History:   Procedure Laterality Date    ABLATION OF ARRHYTHMOGENIC FOCUS FOR ATRIAL FIBRILLATION N/A 6/11/2024    Procedure: Ablation atrial fibrillation;  Surgeon: Bronson Bowden MD;  Location: [facility] EP LAB;  Service: Cardiology;  Laterality: N/A;  AF, FELICIANO (cx if SR), PVI, RFA, Carto, Gen, GP, 3 Prep    ABLATION, ATRIAL FLUTTER, TYPICAL  6/11/2024    Procedure: Ablation, Atrial Flutter, Typical;  Surgeon: rBonson Bowden MD;  Location: [facility] EP LAB;  Service: Cardiology;;    APPENDECTOMY      BONE MARROW BIOPSY Left 6/26/2024    Procedure: Biopsy-bone marrow;  Surgeon: Bridger Zapata MD;  Location: [facility] ENDO ([facility] FLR);  Service: Oncology;  Laterality: Left;  6/24-pt knows to hold aspirin and eliquis as instructed by hem/onc, precall complete-Kpvt    CARDIOVERSION  6/11/2024    Procedure: Cardioversion;  Surgeon: Bronson Bowden MD;  Location: [facility] EP LAB;  Service: Cardiology;;    CATARACT EXTRACTION W/  INTRAOCULAR LENS IMPLANT Right 3/13/2024    Procedure: EXTRACTION, CATARACT, WITH IOL INSERTION;  Surgeon: Jennifer Palacio MD;  Location: [facility] OR;  Service: Ophthalmology;  Laterality: Right;    CATARACT EXTRACTION W/  INTRAOCULAR LENS IMPLANT Left 4/10/2024    Procedure: EXTRACTION, CATARACT, WITH IOL  INSERTION;  Surgeon: Jennifer Palacio MD;  Location: [facility] OR;  Service: Ophthalmology;  Laterality: Left;    COLONOSCOPY N/A 12/21/2020    Procedure: COLONOSCOPY;  Surgeon: Tico Bell MD;  Location: [facility] ENDO (4TH FLR);  Service: Endoscopy;  Laterality: N/A;  ok to hold eliquis per Dr. Valadez, see telephone encounter 11/13/2020-MS  covid test 12/18 [place]    ECHOCARDIOGRAM,TRANSESOPHAGEAL N/A 2/3/2025    Procedure: Transesophageal echo (FELICIANO) intra-procedure log documentation;  Surgeon: Grayson Lin III, MD;  Location: [facility] EP LAB;  Service: Cardiology;  Laterality: N/A;    KIDNEY TRANSPLANT      TRANSESOPHAGEAL ECHOCARDIOGRAPHY N/A 8/26/2019    Procedure: ECHOCARDIOGRAM, TRANSESOPHAGEAL;  Surgeon: [facility] Diagnostic Provider;  Location: [facility] EP LAB;  Service: Cardiology;  Laterality: N/A;    TRANSESOPHAGEAL ECHOCARDIOGRAPHY N/A 6/11/2024    Procedure: ECHOCARDIOGRAM, TRANSESOPHAGEAL;  Surgeon: Grayson Lin III, MD;  Location: [facility] EP LAB;  Service: Cardiology;  Laterality: N/A;    TREATMENT OF CARDIAC ARRHYTHMIA N/A 8/26/2019    Procedure: CARDIOVERSION;  Surgeon: Bronson Bowden MD;  Location: [facility] EP LAB;  Service: Cardiology;  Laterality: N/A;  AF, FELICIANO, DCCV, MAC, GP, 3 PREP    TREATMENT OF CARDIAC ARRHYTHMIA N/A 2/3/2025    Procedure: Cardioversion or Defibrillation;  Surgeon: Bronson Bowden MD;  Location: [facility] EP LAB;  Service: Cardiology;  Laterality: N/A;  AF, FELICIANO, DCCV, MAC, GP, 3 Prep       Allergies:  Review of patient's allergies indicates:   Allergen Reactions    Nifedipine Other (See Comments)     Gingival hyperplasia       Medications:   Home Medications:  Prior to Admission medications    Medication Sig Start Date End Date Taking? Authorizing Provider   atorvastatin (LIPITOR) 80 MG tablet Take 1 tablet (80 mg total) by mouth once daily. 12/16/24 12/16/25 Yes Gillies, Connor M, DO   cloNIDine 0.1 mg/24 hr td ptwk (CATAPRES) 0.1 mg/24 hr Place 1 patch onto the skin every 7 days. 5/5/25  5/5/26 Yes Cliff Guaman MD   doxazosin (CARDURA) 8 MG Tab Take 1 tablet (8 mg total) by mouth once daily. 5/5/25 5/5/26 Yes Cliff Guaman MD   empagliflozin (JARDIANCE) 10 mg tablet Take 1 tablet (10 mg total) by mouth once daily. 1/3/25  Yes Mima Mack MD   eplerenone (INSPRA) 50 MG Tab Take 1 tablet (50 mg total) by mouth once daily. 5/7/25 5/7/26 Yes Cliff Guaman MD   ergocalciferol (ERGOCALCIFEROL) 50,000 unit Cap Take 1 capsule (50,000 Units total) by mouth every 7 days.  Patient taking differently: Take 50,000 Units by mouth every 7 days. Fridays 11/21/24 6/11/25 Yes Karan Lopez APRN, FNP   famotidine (PEPCID AC ORAL) Take 1 tablet by mouth daily as needed (Acid reflux).   Yes Provider, Historical   gabapentin (NEURONTIN) 300 MG capsule Take 1 capsule (300 mg total) by mouth 2 (two) times daily.  Patient taking differently: Take 300 mg by mouth 3 (three) times daily. 5/5/25 5/5/26 Yes Cliff Guaman MD   hydrALAZINE (APRESOLINE) 100 MG tablet Take 1 tablet (100 mg total) by mouth every 8 (eight) hours. 10/8/24 10/8/25 Yes Nayely Vital PA-C   hydroCHLOROthiazide (HYDRODIURIL) 25 MG tablet Take 1 tablet (25 mg total) by mouth once daily. 5/8/25 5/8/26 Yes Cliff Guaman MD   insulin aspart U-100 (NOVOLOG U-100 INSULIN ASPART) 100 unit/mL injection Use in pump, max daily 100 units. 4/3/25  Yes Karan Lopez APRN, FNP   insulin pump cart,auto,BT,G6/7 (OMNIPOD 5 G6-G7 PODS, GEN 5,) Crtg Change every 48 hours. 4/10/25  Yes Lopez, Irielle L., APRN, FNP   magnesium oxide (MAG-OX) 400 mg (241.3 mg magnesium) tablet Take 1 tablet (400 mg total) by mouth 2 (two) times daily. 3/18/25 3/18/26 Yes Sandra Kern MD   polyethylene glycol (MIRALAX) 17 gram PwPk Take 17 gm (mixed in liquid) by mouth every day.   Yes Provider, Historical   predniSONE (DELTASONE) 5 MG tablet Take 1 tablet (5 mg total) by mouth once daily. 12/3/24  Yes Emilia Abreu,  "NP   rivaroxaban (XARELTO) 15 mg Tab Take 1 tablet (15 mg total) by mouth daily with dinner or evening meal. 8/21/24 8/16/25 Yes Mima Mack MD   tacrolimus (PROGRAF) 1 MG Cap Take 1 capsule (1 mg total) by mouth every 12 (twelve) hours. 3/19/25 3/19/26 Yes Sandra Cook MD   valsartan (DIOVAN) 320 MG tablet Take 1 tablet (320 mg total) by mouth once daily. 3/20/25 3/20/26 Yes Sandra Cook MD   blood sugar diagnostic Strp Use to check blood glucose 1 times daily, to use with insurance preferred meter 5/21/24   Mima Mack MD   blood-glucose meter Misc Use to check blood glucose 1 times daily, to use with insurance preferred meter 5/21/24   Mima Mack MD   blood-glucose sensor (DEXCOM G7 SENSOR) Kayla Change sensor every 10 days. 4/3/25   Karan Lopez APRN, FNP   lancets 30 gauge Misc Use to check blood glucose 1 times daily, to use with insurance preferred meter 5/21/24   Mima Mack MD   meclizine (ANTIVERT) 25 mg tablet Take 1 tablet (25 mg total) by mouth 3 (three) times daily as needed for Dizziness. 3/14/24   Karan Lopez APRN, FNP   mycophenolate (CELLCEPT) 250 mg Cap Take 2 capsules (500 mg total) by mouth 2 (two) times daily. 6/16/24 6/16/25  Quin Smith MD   pen needle, diabetic (BD ULTRA-FINE LO PEN NEEDLE) 32 gauge x 5/32" Ndle USE TO ADMINISTER INSULIN 4 TIMES DAILY. 1/13/25   Karan Lopez APRN, FNP   insulin lispro (HUMALOG KWIKPEN INSULIN) 100 unit/mL pen Inject 18 units w/ breakfast, 16 units w/ lunch and dinner plus scale 150-200 +2, 201-250 +4, 251-300 +6, 301-350 +8. 1/25/22 1/2/23  Lopez, Irielle L., APRN, FNP       Family History:  Family History   Problem Relation Name Age of Onset    Diabetes Mother      Hypertension Mother      Heart disease Mother          CAD with PCI    Heart disease Father      Cancer Brother 2         one sister with breast cancer    Hypertension Brother 2         one sister with HTN and borderline DM " "   Stroke Neg Hx      Kidney disease Neg Hx      Colon cancer Neg Hx      Esophageal cancer Neg Hx         Social History:  Social History[1]    Review of Systems:  Pertinent items are noted in HPI. All other systems are reviewed and are negative.     Objective:   Last 24 Hour Vital Signs:  BP  Min: 148/68  Max: 194/89  Temp  Av.9 °F (37.2 °C)  Min: 98.2 °F (36.8 °C)  Max: 99.9 °F (37.7 °C)  Pulse  Av.4  Min: 63  Max: 72  Resp  Av.8  Min: 14  Max: 20  SpO2  Av.9 %  Min: 93 %  Max: 99 %  Height  Av' 1" (185.4 cm)  Min: 6' 1" (185.4 cm)  Max: 6' 1" (185.4 cm)  Weight  Av.7 kg (200 lb)  Min: 90.7 kg (200 lb)  Max: 90.7 kg (200 lb)  I/O last 3 completed shifts:  In: 848.5 [P.O.:240; IV Piggyback:608.5]  Out: 325 [Urine:325]    Physical Examination:  Physical Exam  Vitals and nursing note reviewed.   Constitutional:       General: He is not in acute distress.     Appearance: He is not toxic-appearing.      Comments: Able to speak in full sentences without SOB   HENT:      Head: Normocephalic and atraumatic.      Nose: Nose normal.      Mouth/Throat:      Mouth: Mucous membranes are moist.   Cardiovascular:      Rate and Rhythm: Normal rate.   Pulmonary:      Effort: Pulmonary effort is normal. No respiratory distress.   Abdominal:      Palpations: Abdomen is soft.   Musculoskeletal:      Right lower leg: Edema present.      Left lower leg: Edema present.   Skin:     General: Skin is warm and dry.   Neurological:      General: No focal deficit present.      Mental Status: He is alert.           Laboratory:  Trended Lab Data:  Recent Labs     25  1153 25  1216 25  0308 25  0309   WBC 12.64  --  12.77*  --    HGB 9.0*  --  8.7*  --    HCT 30.5* 33* 28.7*  --    *  --  123*  --    *  --   --  130*   K 4.1  --   --  4.6   CL 98  --   --  98   CO2 22*  --   --  20*   BUN 55*  --   --  60*   CREATININE 3.0*  --   --  3.1*   GLU 80  --   --  129*   BILITOT 0.6  -- "   --  0.8   AST 13  --   --  14   ALT <5*  --   --  <5*   ALKPHOS 54  --   --  52   CALCIUM 8.8  --   --  8.4*   ALBUMIN 2.9*  --   --  2.6*   PROT 6.1  --   --  5.7*   MG 2.2  --   --  2.2   PHOS 3.9  --   --  3.1       Cardiac:   Recent Labs   Lab 05/22/25  1153   BNP 1,534*       Urinalysis:   Lab Results   Component Value Date    LABURIN No growth 11/25/2016    COLORU Straw 05/22/2025    CLARITYU Clear 10/15/2020    SPECGRAV 1.010 05/22/2025    NITRITE Negative 05/22/2025    KETONESU Negative 03/16/2025    UROBILINOGEN Negative 05/22/2025       Microbiology:  Microbiology Results (last 7 days)       Procedure Component Value Units Date/Time    Blood culture x two cultures. Draw prior to antibiotics. [[id number]]  (Normal) Collected: 05/22/25 1153    Order Status: Completed Specimen: Blood from Peripheral, Hand, Left Updated: 05/23/25 1300     Blood Culture No Growth After 24 Hours    Blood culture x two cultures. Draw prior to antibiotics. [[id number]]  (Normal) Collected: 05/22/25 1153    Order Status: Completed Specimen: Blood from Peripheral, Forearm, Left Updated: 05/23/25 1300     Blood Culture No Growth After 24 Hours    MRSA Screen by PCR [[id number]]     Order Status: Sent Specimen: Nasal Swab     Culture, Respiratory with Gram Stain [[id number]]     Order Status: Sent Specimen: Respiratory             Radiology:  CT chest 5/22: SUAD GGO with some areas of denser consolidation. Mild GGO noted to RUL.     I have personally reviewed the above labs and imaging.     Assessment:     Ravi Zamorano is a 69 y.o. male with h/o deceseased donor kidney transplant (2016, on MMF, tacro, prednisone 5mg), Afib on xarelto, T2DM, HTN, HLD, CVA, HFpEF, and h/o latent TB s/p treatment (2014?), who presented to the [facility] ED on 5/22/2025 with hemoptysis and fever and admitted for pna. Pulm consulted for possible bronch. No availability for bronchoscopy until mid-next week. Would collect resp culture and AFB to send for  culture (expect quant gold to be positive due to h/o latent TB) although overall lower suspicion for active TB as he has reportedly been tx. Can send resp infection panel as well.      Plan:     #PNA   - No significant hypoxemia, pt feels better   - Collect resp culture and AFB culture, can also consider resp infection panel   - No further hemoptysis - hemoptysis likely in setting of acute infection  - Will re-eval pt in am, if continued improvement can likely fu outpatient       Thank you for allowing us to participate in the care of this patient. Please contact me if you have any questions regarding this consult.    Eli Rodriguez MD  [facility] Pulmonary & Critical Care Medicine Fellow         [1]   Social History  Tobacco Use    Smoking status: Former     Current packs/day: 0.00     Average packs/day: 0.5 packs/day for 32.0 years (16.0 ttl pk-yrs)     Types: Cigarettes     Start date: 1984     Quit date: 2016     Years since quittin.4    Smokeless tobacco: Never   Substance Use Topics    Alcohol use: Yes     Comment: Pt reports occasional beers on Sundays. Pt reports drinking daily prior to ESRD diagnosis.    Drug use: No

## 2025-05-23 NOTE — ASSESSMENT & PLAN NOTE
BG goal 140-180  T2DM.  CKD. Omnipod 5.  Recurrent pneumonia.    - Home Insulin Pump  - BG checks /HS/0200  - Hypoglycemia protocol in place  - If blood glucose greater than 300, please ask patient not to eat food or drink anything other than water until correctional insulin has brought it back below 250    ** Please notify Endocrine for any change and/or advance in diet**  ** Please call Endocrine for any BG related issues **    Discharge Planning:   TBD. Please notify endocrinology prior to discharge.

## 2025-05-23 NOTE — ASSESSMENT & PLAN NOTE
Patient has permanent atrial fibrillation. Patient is currently in atrial fibrillation. [id number] Score: 3. The patients heart rate in the last 24 hours is as follows:  Pulse  Min: 63  Max: 72     Antiarrhythmics       Anticoagulants  rivaroxaban tablet 15 mg, With dinner, Oral    Plan  - Replete lytes with a goal of K>4, Mg >2  - Patient is anticoagulated, see medications listed above.  - Patient's afib is currently controlled

## 2025-05-23 NOTE — PROGRESS NOTES
Name: Ravi Zamorano  Medical Record Number: [id number]  Date of Service: 05/23/2025  Note By: Everett Zamora MD    RENAL TRANSPLANT INITIAL CONSULTATION NOTE    Reason for Consultation: Reassessment of kidney transplant recipient and management of immunosuppression.     ORGAN: RIGHT KIDNEY  Donor Type: donation after brain death  PHS Increased Risk: yes  Cold Ischemia: 314 mins  Induction Medications: thymoglobulin    History of Present Illness:  Ravi has ESRD presumed secondary to DM/HTN post  DDRT 11/5/16. his creatinines were in the 1.9-2 ranging late 2024, peaked at 3.0 2/20/25 and  recently have been 2.2-2.5. He presents with fever and noted to have RLL PNA 04/03/2025 .Sepsis protocol initiated and started on broad spectrum abx.Completed 7 day cefipime course on 5/5. At the time there was concern for aspiration pneumonia given his large hiatal hernia. FEES was performed but barium swallow was deferred.  evaluation of cough, fever, chills, mild hemoptysis, and lower back pain starting about 4am this morning. He ran 101F fever last night. Low back pain resolved with tylenol. His hemoptysis occurred 4 times this morning with about 1/2 teaspoon of blood each time   Home IS: tacrolimus 1/1,  mg bid ( Held since last hospital visit) and prednisone 5 mg    ?  Past Medical History:  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    CKD (chronic kidney disease) stage 4, GFR 15-29 ml/min 07/29/2014    Colon polyps 2014    Depression - situational 07/29/2014    Diabetes type 2 since 2000 07/29/2014    Diabetic neuropathy 07/29/2014    Encounter for blood transfusion     History of cardioversion 10/03/2019    History of hepatitis C, s/p successful Rx w/ SVR24 - 9/2017 07/29/2014    Genotype 1a, treatment naive 10/2014 liver biopsy - grade 1 / stage  1 Completed Harvoni w/ SVR    Hyperlipidemia 07/29/2014    Hypertension     Neuropathy     Organ transplant candidate 07/29/2014    Pancreatitis     S/P cadaveric kidney transplant 11/5/2016. ESRD secondary to HTN/DMII 11/05/2016    Type 2 diabetes mellitus with stage 3a chronic kidney disease, with long-term current use of insulin 07/29/2014       Past Surgical History:  Past Surgical History:   Procedure Laterality Date    ABLATION OF ARRHYTHMOGENIC FOCUS FOR ATRIAL FIBRILLATION N/A 6/11/2024    Procedure: Ablation atrial fibrillation;  Surgeon: Bronson Bowden MD;  Location: [facility] EP LAB;  Service: Cardiology;  Laterality: N/A;  AF, FELICIANO (cx if SR), PVI, RFA, Carto, Gen, GP, 3 Prep    ABLATION, ATRIAL FLUTTER, TYPICAL  6/11/2024    Procedure: Ablation, Atrial Flutter, Typical;  Surgeon: Bronson Bowden MD;  Location: [facility] EP LAB;  Service: Cardiology;;    APPENDECTOMY      BONE MARROW BIOPSY Left 6/26/2024    Procedure: Biopsy-bone marrow;  Surgeon: Bridger Zapata MD;  Location: [facility] ENDO (4TH FLR);  Service: Oncology;  Laterality: Left;  6/24-pt knows to hold aspirin and eliquis as instructed by hem/onc, precall complete-Kpvt    CARDIOVERSION  6/11/2024    Procedure: Cardioversion;  Surgeon: Bronson Bowden MD;  Location: [facility] EP LAB;  Service: Cardiology;;    CATARACT EXTRACTION W/  INTRAOCULAR LENS IMPLANT Right 3/13/2024    Procedure: EXTRACTION, CATARACT, WITH IOL INSERTION;  Surgeon: Jennifer Palacio MD;  Location: [facility] OR;  Service: Ophthalmology;  Laterality: Right;    CATARACT EXTRACTION W/  INTRAOCULAR LENS IMPLANT Left 4/10/2024    Procedure: EXTRACTION, CATARACT, WITH IOL INSERTION;  Surgeon: Jennifer Palacio MD;  Location: [facility] OR;  Service: Ophthalmology;  Laterality: Left;    COLONOSCOPY N/A 12/21/2020    Procedure: COLONOSCOPY;  Surgeon: Tico Bell MD;  Location: [facility] ENDO (4TH FLR);  Service: Endoscopy;  Laterality: N/A;  ok to hold eliquis per Dr. Valadez, see telephone encounter  11/13/2020-MS  covid test 12/18 [place]    ECHOCARDIOGRAM,TRANSESOPHAGEAL N/A 2/3/2025    Procedure: Transesophageal echo (FELICIANO) intra-procedure log documentation;  Surgeon: Grayson Lin III, MD;  Location: [facility] EP LAB;  Service: Cardiology;  Laterality: N/A;    KIDNEY TRANSPLANT      TRANSESOPHAGEAL ECHOCARDIOGRAPHY N/A 8/26/2019    Procedure: ECHOCARDIOGRAM, TRANSESOPHAGEAL;  Surgeon: [facility] Diagnostic Provider;  Location: [facility] EP LAB;  Service: Cardiology;  Laterality: N/A;    TRANSESOPHAGEAL ECHOCARDIOGRAPHY N/A 6/11/2024    Procedure: ECHOCARDIOGRAM, TRANSESOPHAGEAL;  Surgeon: Grayson Lin III, MD;  Location: [facility] EP LAB;  Service: Cardiology;  Laterality: N/A;    TREATMENT OF CARDIAC ARRHYTHMIA N/A 8/26/2019    Procedure: CARDIOVERSION;  Surgeon: Bronosn Bowden MD;  Location: [facility] EP LAB;  Service: Cardiology;  Laterality: N/A;  AF, FELICIANO, DCCV, MAC, GP, 3 PREP    TREATMENT OF CARDIAC ARRHYTHMIA N/A 2/3/2025    Procedure: Cardioversion or Defibrillation;  Surgeon: Bronson Bowden MD;  Location: [facility] EP LAB;  Service: Cardiology;  Laterality: N/A;  AF, FELICIANO, DCCV, MAC, GP, 3 Prep       Family History:  Family History   Problem Relation Name Age of Onset    Diabetes Mother      Hypertension Mother      Heart disease Mother          CAD with PCI    Heart disease Father      Cancer Brother 2         one sister with breast cancer    Hypertension Brother 2         one sister with HTN and borderline DM    Stroke Neg Hx      Kidney disease Neg Hx      Colon cancer Neg Hx      Esophageal cancer Neg Hx         Social History:  Social History[1]    Home Medications:   Prescriptions Prior to Admission[2]    Inpatient Medications:  Scheduled Meds:   atorvastatin  80 mg Oral Daily    azithromycin  250 mg Oral Daily    ceFEPime IV (PEDS and ADULTS)  1 g Intravenous Q12H    cloNIDine 0.1 mg/24 hr td ptwk  1 patch Transdermal Q7 Days    doxazosin  8 mg Oral Daily    empagliflozin  10 mg Oral Daily    gabapentin   100 mg Oral TID    And    gabapentin  200 mg Oral QHS    hydrALAZINE  100 mg Oral Q8H    polyethylene glycol  17 g Oral Daily    predniSONE  5 mg Oral Daily    rivaroxaban  15 mg Oral Daily with dinner    tacrolimus  1 mg Oral BID       Continuous Infusions:   insulin aspart U-100  0.6 Units Subcutaneous Continuous           PRN Meds:.  Current Facility-Administered Medications:     dextrose 50%, 12.5 g, Intravenous, PRN    dextrose 50%, 25 g, Intravenous, PRN    glucagon (human recombinant), 1 mg, Intramuscular, PRN    glucose, 16 g, Oral, PRN    glucose, 24 g, Oral, PRN    insulin aspart U-100, 0-20 Units, Subcutaneous, PRN    naloxone, 0.02 mg, Intravenous, PRN    sodium chloride 0.9%, 10 mL, Intravenous, Q12H PRN    Pharmacy to dose Vancomycin consult, , , Once **AND** vancomycin - pharmacy to dose, , Intravenous, pharmacy to manage frequency    Allergies:  Nifedipine    Review of Systems:  Reviewed. Pertinent positives and negatives included in HPI.    Physical Exam:  Vitals:  Vitals:    05/23/25 1149   BP: (!) 169/80   Pulse: 63   Resp: 18   Temp: 98.4 °F (36.9 °C)     Temp:  [98.2 °F (36.8 °C)-99.9 °F (37.7 °C)] 98.4 °F (36.9 °C)  Pulse:  [63-72] 63  Resp:  [14-20] 18  SpO2:  [93 %-99 %] 93 %  BP: (148-194)/(68-89) 169/80    No fever reported overnight    Exam  General: alert, conversational, NAD  HEENT: moist mucus membranes  Neck: no JVD  Respiratory: decreased breath sounds pulmonary bases bilateral, Ronchi left lung  Cardiovacular: RRR, No murmur appreciated  Gastrointestinal: Soft, non-tender   Renal allograft exam: No tenderness, no bruit   Extremities: right lower bilateral  edema    Labs:  Lab Results   Component Value Date    WBC 12.77 (H) 05/23/2025    HGB 8.7 (L) 05/23/2025    HCT 28.7 (L) 05/23/2025     (L) 05/23/2025    K 4.6 05/23/2025    CL 98 05/23/2025    CO2 20 (L) 05/23/2025    BUN 60 (H) 05/23/2025    CREATININE 3.1 (H) 05/23/2025    EGFRNONAA 44.9 (A) 07/19/2022    CALCIUM 8.4 (L)  05/23/2025    PHOS 3.1 05/23/2025    MG 2.2 05/23/2025    ALBUMIN 2.6 (L) 05/23/2025    AST 14 05/23/2025    ALT <5 (L) 05/23/2025         Imaging Studies:  Reviewed.   Chest x ray Impression:     Detrimental interval change in the appearance of the chest since 04/29/2025 is appreciated, as the current examination demonstrates interval development of some airspace consolidation in the left upper lobe.  Continued demonstration of cardiomegaly.  Airspace consolidation in the right lower lung zone seen on 04/29/2025 has resolved.    CT chest Impression:     New left upper lobe perihilar airspace disease when compared to 05/01/2025.  A similar finding in the right lung on the a previous exam is substantially improved.  The appearance is most consistent with multifocal pneumonia.  Bilateral pleural effusions right greater than left, improved when compared to 05/01/2025.  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           Assessment/Plan:  69 y.o. male with ESRD presumably 2/2 HTN/T2DM s/p DDRT (11/5/2016) on tacrolimus//cellcept/pred, who is admitted for recurrent SUAD pneumonia.      ESRD 2/2 HTN/T2DM s/p cadaveric kidney transplant (11/5/16)  PHUONG on CKD4  - baseline Cr ~2's with eGFR ~20s  - suspect PHUONG 2/2 hemodynamic instability , poor appetite, fever  - Cr today 3.1  - Continue PO Hydration  - Monitoring I&O      Immunosuppression Management  - Last tac level 5.5 05/08/25  - Continue Prograf 1mg BID; goal trough level is 5-7 ng/ml  - Prograft level today 5.4   - Continue prednisone 5mg daily  - Due to recurrent pneumonias, will continue hold MMF    Anemia  - Hb currently 8.7 g/dl  - Tsat 30% (02/2025)  - last known ferritin in system 800s in 04/2024  - patient receives EPO injections outpatient  - continue to monitor    Hyperglycemia  - blood sugars have been 131 to 200   in the last 24 hours  - continue SSI    HTN  - BP  need better controlled    -  continue to monitor      Everett Zamora MD  [employer] Nephrology Fellow, PGY-4  Kidney Transplant Medicine         [1]   Social History  Socioeconomic History    Marital status:    Occupational History     Employer: [employer]   Tobacco Use    Smoking status: Former     Current packs/day: 0.00     Average packs/day: 0.5 packs/day for 32.0 years (16.0 ttl pk-yrs)     Types: Cigarettes     Start date: 1984     Quit date: 2016     Years since quittin.4    Smokeless tobacco: Never   Substance and Sexual Activity    Alcohol use: Yes     Comment: Pt reports occasional beers on Sundays. Pt reports drinking daily prior to ESRD diagnosis.    Drug use: No    Sexual activity: Yes     Partners: Female   Social History Narrative     for 14 years     for 38 years    2 children ages 41, 42     Social Drivers of Health     Financial Resource Strain: Low Risk  (2025)    Overall Financial Resource Strain (CARDIA)     Difficulty of Paying Living Expenses: Not hard at all   Food Insecurity: No Food Insecurity (2025)    Hunger Vital Sign     Worried About Running Out of Food in the Last Year: Never true     Ran Out of Food in the Last Year: Never true   Transportation Needs: No Transportation Needs (2025)    PRAPARE - Transportation     Lack of Transportation (Medical): No     Lack of Transportation (Non-Medical): No   Physical Activity: Insufficiently Active (2025)    Exercise Vital Sign     Days of Exercise per Week: 7 days     Minutes of Exercise per Session: 10 min   Stress: No Stress Concern Present (2025)    [employer] [place] [employer] - Occupational Stress Questionnaire     Feeling of Stress : Not at all   Housing Stability: Low Risk  (2025)    Housing Stability Vital Sign     Unable to Pay for Housing in the Last Year: No     Homeless in the Last Year: No   [2]   Facility-Administered Medications Prior to Admission   Medication Dose  Route Frequency Provider Last Rate Last Admin    epoetin tanya injection 4,000 Units  4,000 Units Subcutaneous Q7 Days          Medications Prior to Admission   Medication Sig Dispense Refill Last Dose/Taking    atorvastatin (LIPITOR) 80 MG tablet Take 1 tablet (80 mg total) by mouth once daily. 90 tablet 3 Taking    cloNIDine 0.1 mg/24 hr td ptwk (CATAPRES) 0.1 mg/24 hr Place 1 patch onto the skin every 7 days. 4 patch 11 5/20/2025    doxazosin (CARDURA) 8 MG Tab Take 1 tablet (8 mg total) by mouth once daily. 30 tablet 11 Taking    empagliflozin (JARDIANCE) 10 mg tablet Take 1 tablet (10 mg total) by mouth once daily. 90 tablet 0 Taking    eplerenone (INSPRA) 50 MG Tab Take 1 tablet (50 mg total) by mouth once daily. 30 tablet 11 Taking    ergocalciferol (ERGOCALCIFEROL) 50,000 unit Cap Take 1 capsule (50,000 Units total) by mouth every 7 days. (Patient taking differently: Take 50,000 Units by mouth every 7 days. Fridays) 4 capsule 6 Taking Differently    famotidine (PEPCID AC ORAL) Take 1 tablet by mouth daily as needed (Acid reflux).   Taking As Needed    gabapentin (NEURONTIN) 300 MG capsule Take 1 capsule (300 mg total) by mouth 2 (two) times daily. (Patient taking differently: Take 300 mg by mouth 3 (three) times daily.) 60 capsule 11 Taking Differently    hydrALAZINE (APRESOLINE) 100 MG tablet Take 1 tablet (100 mg total) by mouth every 8 (eight) hours. 270 tablet 3 Taking    hydroCHLOROthiazide (HYDRODIURIL) 25 MG tablet Take 1 tablet (25 mg total) by mouth once daily. 30 tablet 11 Taking    insulin aspart U-100 (NOVOLOG U-100 INSULIN ASPART) 100 unit/mL injection Use in pump, max daily 100 units. 30 mL 11 Taking    insulin pump cart,auto,BT,G6/7 (OMNIPOD 5 G6-G7 PODS, GEN 5,) Crtg Change every 48 hours. 45 each 3 Taking    magnesium oxide (MAG-OX) 400 mg (241.3 mg magnesium) tablet Take 1 tablet (400 mg total) by mouth 2 (two) times daily. 60 tablet 11 Taking    polyethylene glycol (MIRALAX) 17 gram PwPk  "Take 17 gm (mixed in liquid) by mouth every day.   Past Month    predniSONE (DELTASONE) 5 MG tablet Take 1 tablet (5 mg total) by mouth once daily. 30 tablet 11 Taking    rivaroxaban (XARELTO) 15 mg Tab Take 1 tablet (15 mg total) by mouth daily with dinner or evening meal. 30 tablet 11 Taking    tacrolimus (PROGRAF) 1 MG Cap Take 1 capsule (1 mg total) by mouth every 12 (twelve) hours. 60 capsule 11 Taking    valsartan (DIOVAN) 320 MG tablet Take 1 tablet (320 mg total) by mouth once daily. 90 tablet 3 Taking    blood sugar diagnostic Strp Use to check blood glucose 1 times daily, to use with insurance preferred meter 100 each 11     blood-glucose meter Misc Use to check blood glucose 1 times daily, to use with insurance preferred meter 1 each 0     blood-glucose sensor (DEXCOM G7 SENSOR) Kayla Change sensor every 10 days. 3 each 11     lancets 30 gauge Misc Use to check blood glucose 1 times daily, to use with insurance preferred meter 100 each 3     meclizine (ANTIVERT) 25 mg tablet Take 1 tablet (25 mg total) by mouth 3 (three) times daily as needed for Dizziness. 21 tablet 3 More than a month    [Paused] mycophenolate (CELLCEPT) 250 mg Cap Take 2 capsules (500 mg total) by mouth 2 (two) times daily. 120 capsule 11     pen needle, diabetic (BD ULTRA-FINE LO PEN NEEDLE) 32 gauge x 5/32" Ndle USE TO ADMINISTER INSULIN 4 TIMES DAILY. 400 each 3      "

## 2025-05-23 NOTE — SUBJECTIVE & OBJECTIVE
"Interval HPI:   No acute events overnight. Patient in room 1110/1110 A. Blood glucose variable. BG at, above, and below goal on current insulin regimen (Home Insulin Pump). Steroid use- Prednisone .      Renal function- Abnormal -        Diet Heart Healthy Fluid - 1500mL     Eatin%  Nausea: No  Hypoglycemia and intervention: No  Fever: No  TPN and/or TF: No    BP (!) 171/81   Pulse 65   Temp 98.2 °F (36.8 °C) (Oral)   Resp 14   Ht 6' 1" (1.854 m)   Wt 90.7 kg (200 lb)   SpO2 96%   BMI 26.39 kg/m²     Labs Reviewed and Include    Recent Labs   Lab 25  0309   *   CALCIUM 8.4*   ALBUMIN 2.6*   PROT 5.7*   *   K 4.6   CO2 20*   CL 98   BUN 60*   CREATININE 3.1*   ALKPHOS 52   ALT <5*   AST 14   BILITOT 0.8     Lab Results   Component Value Date    WBC 12.77 (H) 2025    HGB 8.7 (L) 2025    HCT 28.7 (L) 2025    MCV 75 (L) 2025     (L) 2025     No results for input(s): "TSH", "FREET4" in the last 168 hours.  Lab Results   Component Value Date    HGBA1C 8.3 (H) 2025       Nutritional status:   Body mass index is 26.39 kg/m².  Lab Results   Component Value Date    ALBUMIN 2.6 (L) 2025    ALBUMIN 2.9 (L) 2025    ALBUMIN 2.6 (L) 2025     No results found for: "PREALBUMIN"    Estimated Creatinine Clearance: 26.1 mL/min (A) (based on SCr of 3.1 mg/dL (H)).    Accu-Checks  Recent Labs     25  1651 25  1942 25  0739   POCTGLUCOSE 132* 254* 131*       Current Medications and/or Treatments Impacting Glycemic Control  Immunotherapy:    Immunosuppressants           Stop Route Frequency     tacrolimus capsule 1 mg         -- Oral 2 times daily          Steroids:   Hormones (From admission, onward)      Start     Stop Route Frequency Ordered    25 0900  predniSONE tablet 5 mg         -- Oral Daily 25 3916          Pressors:    Autonomic Drugs (From admission, onward)      None          Hyperglycemia/Diabetes " Medications:   Antihyperglycemics (From admission, onward)      Start     Stop Route Frequency Ordered    05/22/25 1730  insulin aspart U-100 insulin pump from home 0.6 Units        Question Answer Comment   Target number 120    Basal Rate #1 0.6    Basal rate #1 time 1055-5191        -- SubQ Continuous 05/22/25 1508    05/22/25 1730  insulin aspart U-100 insulin pump from home 0-20 Units        Question Answer Comment   Target number 120    Carbohydrate coverage #1 1:7.5    Carbohydrate coverage #1 time 3749-8649    Sensitivity #1 1:30    Sensitivity #1 time [id number]        -- SubQ As needed (PRN) 05/22/25 1508    05/22/25 1530  empagliflozin (Jardiance) tablet 10 mg        Question Answer Comment   Does this patient have a diagnosis of heart failure? Yes    Does this patient have type 1 diabetes or diabetic ketoacidosis? No    Does this patient have symptomatic hypotension? No    Is the patient NPO or pending major surgery in next 3 days or less? No        -- Oral Daily 05/22/25 1422

## 2025-05-23 NOTE — CONSULTS
[facility]   Infectious Diseases  Consult Note     Patient Name: Ravi Zamorano   MRN:  [id number]   Admission Date:  5/22/2025  Length of Stay:  1  Attending Physician:  Izabella Cavanaugh MD   Primary Care Provider:  Mima Mack MD       Isolation Status: Airborne and Contact and Droplet    Assessment/Plan:      Ravi Zamorano is a 69 year old male with history DM, HFpEF, Atril Fib/Flutter and kidney transplant (2016) who represented to the ED (5/22) for cough with blood streaks. Patient recently admitted for recurrent pneumonia (4/29 - 5/10)  with overall negative workup to include cultures and imaging consistent with pneumonia completed azithromycin and cefepime with improvement in symptoms and discharge. ID consulted for recurrent pneumonia. CT chest (5/22) with ground glass attenuation with interlobar thickening and scattered dense areas of consolidation of the left upper lobe, new findings from 5/1/2025.     Concern for recurrent pneumonia   - 5/22 blood cultures NGTD  - MRSA Nares PCR pending   - Quantiferon gold pending, very low concern for active TB disease at this time.   - Respiratory culture ordered and is pending  - Immunoglobulin panel ordered   - Would discontinue Cefepime and Azithromycin   - Would initiate Levofloxacin 750mg Daily (Qtc 425) for CAP   - Would add Urine Legionella antigen in setting of possible recurrent pneumonia and hyponatremia.   - Would also consider aspiration other other causes of consolidation and hyponatremia as part of workup       Thank you for the consult. ID will continue to follow. Please call if you have any questions.      Chris Arcos MD  Infectious Diseases Fellow     Subjective:      Principal Problem: Left upper lobe consolidation       HPI:  Ravi Zamorano is a 69 year old male with history DM, HFpEF, Atril Fib/Flutter and kidney transplant (2016) who represented to the ED (5/22) for cough with blood  "streaks. Patient recently admitted for recurrent pneumonia ( - 5/10)  with overall negative workup to include cultures and imaging consistent with pneumonia completed azithromycin and cefepime with improvement in symptoms and discharged. States since his discharge he was doing well until Monday () when he started to feel bad, then shortly after developed a cough which he noted to have blood streaked sputum. Reports that caused him concern so wanted to be evaluated in the hospital. Denies exertional dyspnea, but does report chills and subjective fever. States he is eating and drinking well and reports no headaches, changes in vision, chest pain, shortness of breath, nausea, vomiting, diarrhea, or FND.         Review of Systems   Review of Systems   Constitutional:  Positive for fever (subjuctive). Negative for malaise/fatigue and weight loss.   HENT:  Negative for congestion, hearing loss and sore throat.    Eyes:  Negative for double vision and pain.   Respiratory:  Positive for cough, hemoptysis and sputum production. Negative for shortness of breath and wheezing.    Cardiovascular:  Negative for chest pain and leg swelling.   Gastrointestinal:  Negative for abdominal pain, blood in stool, constipation, diarrhea, nausea and vomiting.   Genitourinary:  Negative for dysuria, frequency and urgency.   Musculoskeletal:  Negative for back pain.   Skin:  Negative for rash.   Neurological:  Negative for dizziness, focal weakness and headaches.       Objective:      Last 24 Hour Vital Signs:  BP  Min: 148/68  Max: 194/89  Temp  Av.9 °F (37.2 °C)  Min: 98.2 °F (36.8 °C)  Max: 99.9 °F (37.7 °C)  Pulse  Av.4  Min: 63  Max: 72  Resp  Av.9  Min: 14  Max: 20  SpO2  Av.7 %  Min: 93 %  Max: 99 %  Height  Av' 1" (185.4 cm)  Min: 6' 1" (185.4 cm)  Max: 6' 1" (185.4 cm)  Weight  Av.7 kg (200 lb)  Min: 90.7 kg (200 lb)  Max: 90.7 kg (200 lb)  I/O last 3 completed shifts:  In: 848.5 [P.O.:240; IV " Piggyback:608.5]  Out: 325 [Urine:325]    Body mass index is 26.39 kg/m².  Estimated Creatinine Clearance: 26.1 mL/min (A) (based on SCr of 3.1 mg/dL (H)).        Physical Exam  Physical Exam  Vitals and nursing note reviewed. Exam conducted with a chaperone present.   HENT:      Head: Atraumatic.      Right Ear: External ear normal.      Left Ear: External ear normal.      Nose: Nose normal.   Eyes:      General:         Right eye: No discharge.         Left eye: No discharge.      Extraocular Movements: Extraocular movements intact.      Conjunctiva/sclera: Conjunctivae normal.   Cardiovascular:      Rate and Rhythm: Normal rate.      Pulses: Normal pulses.   Pulmonary:      Effort: Pulmonary effort is normal.   Musculoskeletal:         General: Normal range of motion.      Cervical back: Normal range of motion.   Skin:     General: Skin is warm and dry.      Capillary Refill: Capillary refill takes less than 2 seconds.   Neurological:      General: No focal deficit present.      Mental Status: Mental status is at baseline.   Psychiatric:         Mood and Affect: Mood normal.         Behavior: Behavior normal.          Labs  Recent Labs   Lab 05/22/25  1153 05/22/25  1216 05/23/25  0308 05/23/25  0309   WBC 12.64  --  12.77*  --    HGB 9.0*  --  8.7*  --    HCT 30.5* 33* 28.7*  --    *  --  123*  --    MCV 76*  --  75*  --    RDW 19.0*  --  19.1*  --    *  --   --  130*   K 4.1  --   --  4.6   CL 98  --   --  98   CO2 22*  --   --  20*   BUN 55*  --   --  60*   CREATININE 3.0*  --   --  3.1*   GLU 80  --   --  129*   PROT 6.1  --   --  5.7*   ALBUMIN 2.9*  --   --  2.6*   BILITOT 0.6  --   --  0.8   AST 13  --   --  14   ALKPHOS 54  --   --  52   ALT <5*  --   --  <5*         Microbiology:  Microbiology Results (last 7 days)       Procedure Component Value Units Date/Time    MRSA Screen by PCR [[id number]] Collected: 05/23/25 1349    Order Status: Sent Specimen: Nasal Swab Updated: 05/23/25 8565     Blood culture x two cultures. Draw prior to antibiotics. [[id number]]  (Normal) Collected: 05/22/25 1153    Order Status: Completed Specimen: Blood from Peripheral, Hand, Left Updated: 05/23/25 1300     Blood Culture No Growth After 24 Hours    Blood culture x two cultures. Draw prior to antibiotics. [[id number]]  (Normal) Collected: 05/22/25 1153    Order Status: Completed Specimen: Blood from Peripheral, Forearm, Left Updated: 05/23/25 1300     Blood Culture No Growth After 24 Hours    Culture, Respiratory with Gram Stain [[id number]]     Order Status: Sent Specimen: Respiratory             Significant Imaging: I have reviewed all pertinent imaging results/findings within the past 24 hours.  Imaging Results               CT Chest Without Contrast (Final result)  Result time 05/22/25 18:03:46      Final result by Ashly Orr MD (05/22/25 18:03:46)                   Impression:      New left upper lobe perihilar airspace disease when compared to 05/01/2025.  A similar finding in the right lung on the a previous exam is substantially improved.  The appearance is most consistent with multifocal pneumonia.    Bilateral pleural effusions right greater than left, improved when compared to 05/01/2025.    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    This report was flagged in Epic as abnormal.    Electronically signed by resident: Adalid Connor  Date:    05/22/2025  Time:    17:32    Electronically signed by: Ashly Orr  Date:    05/22/2025  Time:    18:03               Narrative:    EXAMINATION:  CT CHEST WITHOUT CONTRAST    CLINICAL HISTORY:  Hemoptysis;    TECHNIQUE:  Using low dose technique the chest was surveyed from above the pulmonary apices through the posterior costophrenic angles.  Data was reconstructed for multiplanar images in axial, sagittal and coronal planes and for maximal intensity projection images in the the axial, sagittal  and coronal planes.    COMPARISON:  CT chest without 05/01/2025    FINDINGS:  Support tubes and lines: None.    Aorta: The ascending aorta measures up to 4.3 cm, unchanged when compared to 05/01/2025.    Heart: Mildly enlarged.    Coronary arteries: Severe multi-vessel coronary artery calcifications.    Pericardium: Normal. No effusion, thickening, or calcification.    Central pulmonary arteries: Enlarged, 3.6 cm, unchanged when compared to 05/01/2025.    Base of neck/thyroid: Unremarkable.    Lymph nodes: No supraclavicular, axillary, internal mammary, mediastinal, or hilar adenopathy.    Esophagus: Patulous esophagus.    Pleura: Bilateral pleural effusions right greater than left, improved when compared to 05/01/2025.    Airways: Unremarkable.    Lungs: Left upper lobe, central predominant, ground-glass attenuation with interspersed interlobar thickening and scattered dense areas of consolidation.  This appearance is also seen within the left lower, right upper and right middle lung zones.  The right lung airspace disease is significantly improved when compared to 05/01/2025.  The left lung airspace disease is new.  There are emphysematous changes of the bilateral lungs.  Bibasilar atelectasis.    Upper abdomen: There is severe calcific atherosclerosis of the aorta and SMA.  There is calcific atherosclerosis of the celiac artery origin and the splenic artery.  Distended gallbladder without evidence of calcified cholelithiasis or cholecystitis.    Body wall: Unremarkable.    Bones: No acute fractures or dislocations.  Degenerative changes and spondylolysis of the visualized thoracic spine.                                       X-Ray Chest AP Portable (Final result)  Result time 05/22/25 11:41:14      Final result by Tico López MD (05/22/25 11:41:14)                   Impression:      Detrimental interval change in the appearance of the chest since 04/29/2025 is appreciated, as the current examination  demonstrates interval development of some airspace consolidation in the left upper lobe.  Continued demonstration of cardiomegaly.  Airspace consolidation in the right lower lung zone seen on 04/29/2025 has resolved.      Electronically signed by: Tico López MD  Date:    05/22/2025  Time:    11:41               Narrative:    EXAMINATION:  XR CHEST AP PORTABLE    CLINICAL HISTORY:  Sepsis;    TECHNIQUE:  One view    COMPARISON:  Comparison is made to 04/29/2025.    FINDINGS:  The heart remains mildly enlarged, but the appearance of the cardiomediastinal silhouette demonstrates no detrimental change since the examination referenced above.  There has been a detrimental change in the appearance of the lung zones since that time, however, as opacity consistent with airspace consolidation has developed in the left upper lung zone.  Clinical correlation is necessary, as the findings may represent acute pneumonia.  The right lung and the mid and lower lung zones on the left side appear essentially clear, with note made of the fact that the airspace consolidation in the right lower lung zone present on the previous study has resolved in the interval.  No pleural fluid.  No pneumothorax.

## 2025-05-23 NOTE — PLAN OF CARE
Problem: Adult Inpatient Plan of Care  Goal: Absence of Hospital-Acquired Illness or Injury  Outcome: Progressing  Goal: Optimal Comfort and Wellbeing  Outcome: Progressing  Goal: Readiness for Transition of Care  Outcome: Progressing     Problem: Diabetes Comorbidity  Goal: Blood Glucose Level Within Targeted Range  Outcome: Progressing     Problem: Acute Kidney Injury/Impairment  Goal: Fluid and Electrolyte Balance  Outcome: Progressing     Problem: Pneumonia  Goal: Fluid Balance  Outcome: Progressing

## 2025-05-23 NOTE — PROGRESS NOTES
VANCOMYCIN DOSING BY PHARMACY DISCONTINUATION NOTE        Ravi Zamorano is a 69 y.o. male who had been consulted for vancomycin dosing.    The pharmacy consult for vancomycin dosing has been discontinued.     Vancomycin Dosing by Pharmacy Consult will sign-off. Please reconsult if necessary. Thank you for allowing us to participate in this patient's care.         Thank you for the consult,     Keesha Sims, Pharm.D.  EXT [phone]

## 2025-05-23 NOTE — PROGRESS NOTES
"[facility]  Endocrinology  Progress Note    Admit Date: 2025     Reason for Consult: Management of T2DM, Hyperglycemia      Diabetes diagnosis year:       Home Diabetes Medications:    OMNIPOD 5  0.6 u/hr mn-0600 , 9p-mn, 0.7 u/hr 6a-9p   Target 120-130 mg/dl   Iob 3 hours  Max bolus 20 units   Max basal 2 u/hr   Isf 40 mn-mn  ICR 1 to 7.5      Presets:  Snack: 4 units (28g)  Small Meal: 8 units (56g)  Medium Meal: 12 units (90g)  Large Meal: 15 units (114g)  Super Large Meal: 18 units (130g)     How often checking glucose at home? >4 x day   BG readings on regimen: 150s  Hypoglycemia on the regimen?  No  Missed doses on regimen?  No     Diabetes Complications include:     Hyperglycemia     Complicating diabetes co morbidities:   S/p Kidney tx          HPI: his is a 69-year-old male with a history of DM, renal transplant , CHFpEF, AF/AFL (PVI/PWI/CTI 2024), CVA, recurrent PNA who presents with fevers, chills. States that symptoms are similar to last month when he was admitted for pneumonia. Was in his usual state of health until last night when he started experiencing intermittent fevers, chills. Received IV vancomycin, azithromycin, Zosyn.  Septic workup obtained. Endocrine conulsted to manage hyperglycemia, type 2 diabetes, and insulin pump therapy.    Lab Results   Component Value Date    HGBA1C 8.3 (H) 2025         Interval HPI:   No acute events overnight. Patient in room 1110/1110 A. Blood glucose variable. BG at, above, and below goal on current insulin regimen (Home Insulin Pump). Steroid use- Prednisone .      Renal function- Abnormal -        Diet Heart Healthy Fluid - 1500mL     Eatin%  Nausea: No  Hypoglycemia and intervention: No  Fever: No  TPN and/or TF: No    BP (!) 171/81   Pulse 65   Temp 98.2 °F (36.8 °C) (Oral)   Resp 14   Ht 6' 1" (1.854 m)   Wt 90.7 kg (200 lb)   SpO2 96%   BMI 26.39 kg/m²     Labs Reviewed and Include  " "  Recent Labs   Lab 05/23/25  0309   *   CALCIUM 8.4*   ALBUMIN 2.6*   PROT 5.7*   *   K 4.6   CO2 20*   CL 98   BUN 60*   CREATININE 3.1*   ALKPHOS 52   ALT <5*   AST 14   BILITOT 0.8     Lab Results   Component Value Date    WBC 12.77 (H) 05/23/2025    HGB 8.7 (L) 05/23/2025    HCT 28.7 (L) 05/23/2025    MCV 75 (L) 05/23/2025     (L) 05/23/2025     No results for input(s): "TSH", "FREET4" in the last 168 hours.  Lab Results   Component Value Date    HGBA1C 8.3 (H) 03/27/2025       Nutritional status:   Body mass index is 26.39 kg/m².  Lab Results   Component Value Date    ALBUMIN 2.6 (L) 05/23/2025    ALBUMIN 2.9 (L) 05/22/2025    ALBUMIN 2.6 (L) 05/03/2025     No results found for: "PREALBUMIN"    Estimated Creatinine Clearance: 26.1 mL/min (A) (based on SCr of 3.1 mg/dL (H)).    Accu-Checks  Recent Labs     05/22/25  1651 05/22/25  1942 05/23/25  0739   POCTGLUCOSE 132* 254* 131*       Current Medications and/or Treatments Impacting Glycemic Control  Immunotherapy:    Immunosuppressants           Stop Route Frequency     tacrolimus capsule 1 mg         -- Oral 2 times daily          Steroids:   Hormones (From admission, onward)      Start     Stop Route Frequency Ordered    05/23/25 0900  predniSONE tablet 5 mg         -- Oral Daily 05/22/25 1849          Pressors:    Autonomic Drugs (From admission, onward)      None          Hyperglycemia/Diabetes Medications:   Antihyperglycemics (From admission, onward)      Start     Stop Route Frequency Ordered    05/22/25 1730  insulin aspart U-100 insulin pump from home 0.6 Units        Question Answer Comment   Target number 120    Basal Rate #1 0.6    Basal rate #1 time 8129-1405        -- SubQ Continuous 05/22/25 1508    05/22/25 1730  insulin aspart U-100 insulin pump from home 0-20 Units        Question Answer Comment   Target number 120    Carbohydrate coverage #1 1:7.5    Carbohydrate coverage #1 time 9513-3928    Sensitivity #1 1:30  "   Sensitivity #1 time [id number]        -- SubQ As needed (PRN) 05/22/25 1508    05/22/25 1530  empagliflozin (Jardiance) tablet 10 mg        Question Answer Comment   Does this patient have a diagnosis of heart failure? Yes    Does this patient have type 1 diabetes or diabetic ketoacidosis? No    Does this patient have symptomatic hypotension? No    Is the patient NPO or pending major surgery in next 3 days or less? No        -- Oral Daily 05/22/25 1425            ASSESSMENT and PLAN    Immunology/Multi System  Prophylactic immunotherapy  On immunosuppressive therapy per transplant team; may elevate BG readings        Endocrine  Type 2 diabetes mellitus with stage 4 chronic kidney disease, with long-term current use of insulin  BG goal 140-180  T2DM.  CKD. Omnipod 5.  Recurrent pneumonia.    - Home Insulin Pump  - BG checks /HS/0200  - Hypoglycemia protocol in place  - If blood glucose greater than 300, please ask patient not to eat food or drink anything other than water until correctional insulin has brought it back below 250    ** Please notify Endocrine for any change and/or advance in diet**  ** Please call Endocrine for any BG related issues **    Discharge Planning:   TBD. Please notify endocrinology prior to discharge.          Chris Payan PA-C  Endocrinology  [facility]

## 2025-05-23 NOTE — PT/OT/SLP EVAL
Speech Language Pathology Evaluation  Bedside Swallow  Discharge    Patient Name:  Ravi Zamorano   MRN:  [id number]  Admitting Diagnosis: Left upper lobe consolidation    Recommendations:                 General Recommendations:  GI evaluation and Follow-up not indicated  Diet recommendations:  Regular Diet - IDDSI Level 7, Thin liquids - IDDSI Level 0   Aspiration Precautions: Standard aspiration precautions   General Precautions: Standard, fall  Communication strategies:  none    Assessment:     Ravi Zamorano is a 69 y.o. male with oropharyngeal swallow deemed WFL. No further ST warranted    History:     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    CKD (chronic kidney disease) stage 4, GFR 15-29 ml/min 07/29/2014    Colon polyps 2014    Depression - situational 07/29/2014    Diabetes type 2 since 2000 07/29/2014    Diabetic neuropathy 07/29/2014    Encounter for blood transfusion     History of cardioversion 10/03/2019    History of hepatitis C, s/p successful Rx w/ SVR24 - 9/2017 07/29/2014    Genotype 1a, treatment naive 10/2014 liver biopsy - grade 1 / stage 1 Completed Harvoni w/ SVR    Hyperlipidemia 07/29/2014    Hypertension     Neuropathy     Organ transplant candidate 07/29/2014    Pancreatitis     S/P cadaveric kidney transplant 11/5/2016. ESRD secondary to HTN/DMII 11/05/2016    Type 2 diabetes mellitus with stage 3a chronic kidney disease, with long-term current use of insulin 07/29/2014       Past Surgical History:   Procedure Laterality Date    ABLATION OF ARRHYTHMOGENIC FOCUS FOR ATRIAL FIBRILLATION N/A 6/11/2024    Procedure: Ablation atrial fibrillation;  Surgeon: Bronson Bowden MD;  Location: [facility] LAB;  Service: Cardiology;  Laterality: N/A;  AF, FELICIANO (cx if SR), PVI, RFA, Carto, Gen, GP, 3 Prep    ABLATION, ATRIAL FLUTTER,  TYPICAL  6/11/2024    Procedure: Ablation, Atrial Flutter, Typical;  Surgeon: Bronson Bowden MD;  Location: [facility] EP LAB;  Service: Cardiology;;    APPENDECTOMY      BONE MARROW BIOPSY Left 6/26/2024    Procedure: Biopsy-bone marrow;  Surgeon: Bridger Zapata MD;  Location: [facility] ENDO (4TH FLR);  Service: Oncology;  Laterality: Left;  6/24-pt knows to hold aspirin and eliquis as instructed by hem/onc, precall complete-Kpvt    CARDIOVERSION  6/11/2024    Procedure: Cardioversion;  Surgeon: Bronson Bowden MD;  Location: [facility] EP LAB;  Service: Cardiology;;    CATARACT EXTRACTION W/  INTRAOCULAR LENS IMPLANT Right 3/13/2024    Procedure: EXTRACTION, CATARACT, WITH IOL INSERTION;  Surgeon: Jennifer Palacio MD;  Location: [facility] OR;  Service: Ophthalmology;  Laterality: Right;    CATARACT EXTRACTION W/  INTRAOCULAR LENS IMPLANT Left 4/10/2024    Procedure: EXTRACTION, CATARACT, WITH IOL INSERTION;  Surgeon: Jennifer Palacio MD;  Location: [facility] OR;  Service: Ophthalmology;  Laterality: Left;    COLONOSCOPY N/A 12/21/2020    Procedure: COLONOSCOPY;  Surgeon: Tico Bell MD;  Location: [facility] ENDO (4TH FLR);  Service: Endoscopy;  Laterality: N/A;  ok to hold eliquis per Dr. Valadez, see telephone encounter 11/13/2020-[place]  covid test 12/18 [place]    ECHOCARDIOGRAM,TRANSESOPHAGEAL N/A 2/3/2025    Procedure: Transesophageal echo (FELICIANO) intra-procedure log documentation;  Surgeon: Grayson Lin III, MD;  Location: [facility] EP LAB;  Service: Cardiology;  Laterality: N/A;    KIDNEY TRANSPLANT      TRANSESOPHAGEAL ECHOCARDIOGRAPHY N/A 8/26/2019    Procedure: ECHOCARDIOGRAM, TRANSESOPHAGEAL;  Surgeon: [facility];  Location: [facility] EP LAB;  Service: Cardiology;  Laterality: N/A;    TRANSESOPHAGEAL ECHOCARDIOGRAPHY N/A 6/11/2024    Procedure: ECHOCARDIOGRAM, TRANSESOPHAGEAL;  Surgeon: Grayson Lin III, MD;  Location: [facility] EP LAB;  Service: Cardiology;  Laterality: N/A;    TREATMENT OF CARDIAC ARRHYTHMIA N/A  8/26/2019    Procedure: CARDIOVERSION;  Surgeon: Bronson Bowden MD;  Location: [facility] EP LAB;  Service: Cardiology;  Laterality: N/A;  AF, FELICIANO, DCCV, MAC, GP, 3 PREP    TREATMENT OF CARDIAC ARRHYTHMIA N/A 2/3/2025    Procedure: Cardioversion or Defibrillation;  Surgeon: Bronson Bowden MD;  Location: [facility] EP LAB;  Service: Cardiology;  Laterality: N/A;  AF, FELICIANO, DCCV, MAC, GP, 3 Prep       Social History: Patient lives with spouse.    FEES:  5/2 The patient tolerated the procedure and the equipment was removed. Findings were consistent with the following swallow diagnoses following swallow diagnosis and severity: Functional oral and pharyngeal phases     Dysphagia is further characterized by intact swallow function without evidence of penetration or aspiration with all consistencies presented.      Chest X-Rays: 5/22 FINDINGS:  The heart remains mildly enlarged, but the appearance of the cardiomediastinal silhouette demonstrates no detrimental change since the examination referenced above.  There has been a detrimental change in the appearance of the lung zones since that time, however, as opacity consistent with airspace consolidation has developed in the left upper lung zone.  Clinical correlation is necessary, as the findings may represent acute pneumonia.  The right lung and the mid and lower lung zones on the left side appear essentially clear, with note made of the fact that the airspace consolidation in the right lower lung zone present on the previous study has resolved in the interval.  No pleural fluid.  No pneumothorax.     Impression:  Detrimental interval change in the appearance of the chest since 04/29/2025 is appreciated, as the current examination demonstrates interval development of some airspace consolidation in the left upper lobe.  Continued demonstration of cardiomegaly.  Airspace consolidation in the right lower lung zone seen on 04/29/2025 has resolved.       Prior diet:  "regular/thin    Subjective     Awake/alert  "Oh no you are back" Re SLP entering room. Pt recalls SLP from previous admit when FEES study performed    Pain/Comfort:  Pain Rating 1: 0/10  Pain Rating Post-Intervention 1: 0/10    Respiratory Status: Room air    Objective:     Oral Musculature Evaluation  Oral Musculature: WFL  Dentition: present and adequate  Oral Labial Strength and Mobility: WFL  Lingual Strength and Mobility: WFL  Volitional Cough: adequate  Volitional Swallow: timely  Voice Prior to PO Intake: clear    Bedside Swallow Eval:   Consistencies Assessed:  Thin liquids x4 oz cup  Puree x3 grits  Solids x1 sausage link     Oral Phase:   WFL    Pharyngeal Phase:   no overt clinical signs/symptoms of aspiration    Compensatory Strategies  None    Treatment: Pt with recent FEES from previous admission early this month with recurrent PNA. Oropharyngeal swallow deemed WFL during study. Pt with known large hiatal hernia. Recommend f/u with GI for further assessment of esophageal swallow. No further ST warranted at this time.     Goals:   Multidisciplinary Problems       SLP Goals       Not on file                    Plan:     Plan of Care reviewed with:  patient   SLP Follow-Up:  No       Discharge recommendations:    no St f/u     Time Tracking:     SLP Treatment Date:   05/23/25  Speech Start Time:  0814  Speech Stop Time:  0823     Speech Total Time (min):  9 min    Billable Minutes: Eval Swallow and Oral Function 9    05/23/2025         "

## 2025-05-23 NOTE — ASSESSMENT & PLAN NOTE
Anemia is likely due to nutritional deficiency. Most recent hemoglobin and hematocrit are listed below.  Recent Labs     05/22/25  1153 05/22/25  1216 05/23/25  0308   HGB 9.0*  --  8.7*   HCT 30.5* 33* 28.7*     Plan  - Monitor serial CBC: Daily  - Transfuse PRBC if patient becomes hemodynamically unstable, symptomatic or H/H drops below 7/21.  - Patient has not received any PRBC transfusions to date  - Patient's anemia is currently being monitored

## 2025-05-23 NOTE — PLAN OF CARE
Readmission    CM to bedside - pt provided assessment info. Pt is independent w/ no DME in place, lives w/ spouse. Pt will likely d/c home w/ no needs. Pt lives in a 1 story home.     AL LopezN, RN, [facility]  Transitional Care Manager  [phone]  [email or web address]    [facility] - Internal Medicine Telemetry  Initial Discharge Assessment       Primary Care Provider: Mima Mack MD    Admission Diagnosis: Screening for cardiovascular condition [Z13.6]  SOB (shortness of breath) [R06.02]  Insulin pump in place [Z96.41]  Chest pain [R07.9]  Left upper lobe consolidation [J18.1]  Type 2 diabetes mellitus with stage 4 chronic kidney disease, with long-term current use of insulin [E11.22, N18.4, Z79.4]    Admission Date: 5/22/2025  Expected Discharge Date: 5/25/2025    Transition of Care Barriers: None    Payor: [employer] MEDICARE / Plan: [employer] MEDICARE HMO / Product Type: Capitation /     Extended Emergency Contact Information  Primary Emergency Contact: Erika Zamorano  Address: [address] [place] [address] [facility]  Home Phone: [phone]  Mobile Phone: [phone]  Relation: Spouse    Discharge Plan A: Home with family  Discharge Plan B: Home with family, Home Health      [facility]  [address]  [facility] [address]  Phone: [phone] Fax: [phone]    [id number] [address] AT [address] [address]  Phone: [phone] Fax: [phone]      Initial Assessment (most recent)       Adult Discharge Assessment - 05/23/25 1650          Discharge Assessment    Assessment Type Discharge Planning Assessment     Confirmed/corrected address, phone number and insurance Yes     Confirmed Demographics Correct on Facesheet     Source of Information patient;health record     Communicated UBALDO with patient/caregiver Yes     People in  Home spouse     Name(s) of People in Home spouse Erika     Do you expect to return to your current living situation? Yes     Do you have help at home or someone to help you manage your care at home? Yes     Who are your caregiver(s) and their phone number(s)? spouse Erika [phone]     Prior to hospitilization cognitive status: Alert/Oriented     Current cognitive status: Alert/Oriented     Walking or Climbing Stairs Difficulty no     Dressing/Bathing Difficulty no     Home Layout Able to live on 1st floor     Equipment Currently Used at Home none     Readmission within 30 days? Yes     Patient currently being followed by outpatient case management? Unable to determine (comments)     Do you currently have service(s) that help you manage your care at home? No     Do you take prescription medications? Yes     Who is going to help you get home at discharge? spouse     How do you get to doctors appointments? car, drives self     Are you on dialysis? No     Do you take coumadin? No     Discharge Plan A Home with family     Discharge Plan B Home with family;Home Health     DME Needed Upon Discharge  none     Discharge Plan discussed with: Patient     Transition of Care Barriers None        Physical Activity    On average, how many days per week do you engage in moderate to strenuous exercise (like a brisk walk)? 7 days     On average, how many minutes do you engage in exercise at this level? 30 min        Financial Resource Strain    How hard is it for you to pay for the very basics like food, housing, medical care, and heating? Not hard at all        Housing Stability    In the last 12 months, was there a time when you were not able to pay the mortgage or rent on time? No     At any time in the past 12 months, were you homeless or living in a shelter (including now)? No        Transportation Needs    In the past 12 months, has lack of transportation kept you from medical appointments or from getting medications?  No     In the past 12 months, has lack of transportation kept you from meetings, work, or from getting things needed for daily living? No        Food Insecurity    Within the past 12 months, you worried that your food would run out before you got the money to buy more. Never true     Within the past 12 months, the food you bought just didn't last and you didn't have money to get more. Never true        Stress    Do you feel stress - tense, restless, nervous, or anxious, or unable to sleep at night because your mind is troubled all the time - these days? Only a little        Social Isolation    How often do you feel lonely or isolated from those around you?  Rarely        Alcohol Use    Q1: How often do you have a drink containing alcohol? 2-4 times a month     Q2: How many drinks containing alcohol do you have on a typical day when you are drinking? 1 or 2     Q3: How often do you have six or more drinks on one occasion? Never        Utilities    In the past 12 months has the electric, gas, oil, or water company threatened to shut off services in your home? No        Health Literacy    How often do you need to have someone help you when you read instructions, pamphlets, or other written material from your doctor or pharmacy? Rarely                     Readmission Assessment (most recent)       Readmission Assessment - 05/23/25 1649          Readmission    Was this a planned readmission? No     Why were you hospitalized in the last 30 days? PNA     Why were you readmitted? Related to previous admission     When you left the hospital how did you feel? good     When you left the hospital where did you go? Home with Family     Did patient/caregiver refused recommended DC plan? No     Tell me about what happened between when you left the hospital and the day you returned. coughing     When did you start not feeling well? 1-2 days     Did you try to manage your symptoms your self? Yes     What did you do? rest     Did you  call anyone? No     Why? unsure     Did you try to see or did see a doctor or nurse before you came? Yes     Were you seen? Yes     Did you have  a follow-up appointment on discharge? Yes     Did you go? Yes

## 2025-05-23 NOTE — PROGRESS NOTES
Arvind Jay - Internal Medicine [facility]  Progress Note    Patient Name: Ravi Zamorano  MRN: [id number]  Patient Class: IP- Inpatient   Admission Date: 5/22/2025  Length of Stay: 1 days  Attending Physician: Izabella Cavanaugh MD  Primary Care Provider: Mima Mack MD        Subjective     Principal Problem:Left upper lobe consolidation        HPI:  Ravi Zamorano is a 70yo male with a hx of CKD s/p kidney transplant (2016) on tacrolimus, afib (on xarelto), HTN, HLD, CHF, CVA who presented for evaluation of cough, fever, chills, mild hemoptysis, and lower back pain starting about 4am this morning. He ran 101F fever last night. Low back pain resolved with tylenol. His hemoptysis occurred 4 times this morning with about 1/2 teaspoon of blood each time. He denies any headache, vision changes, chest pain, abdominal pain, N/V/D, or dysuria. He has leg swelling at his baseline.     He was recently admitted 4/29-5/10 for pneumonia. Completed 7 day cefipime course on 5/5. At the time there was concern for aspiration pneumonia given his large hiatal hernia. FEES was performed but barium swallow was deferred.     In the ED VSS, afebrile. CXR notable for interval development of airspace consolidation of SUAD and resolution of RLL consolidation. Labs notable for HS troponin 89 (stable), BNP 1534 (stable), procal 1.82, UA noninfectious, Na 132, BUN 55, Cr 3.0, hgb 9.0 (stable), no leukocytosis.     Overview/Hospital Course:  Respiratory status stable. CT chest with left upper lobe consolidation and resolution of right lower lobe consolidation. Transplant ID consulted who recommend pulmonology evaluation with bronchoscopy. KTM consulted, determined current cr to likely be PHUONG on CKD. Echo obtained to assess fluid status. CVP 15. EF 50% with in determinant diastolic function. Pt states he takes home lasix 80mg PO daily. Diuresis initiated.     Interval History: NAEO. Remains asymptomatic. Persistent  leukocytosis. Echo with CVP 15, will diurese. Transplant ID consulted who recommend Pulm eval with bronch.     Review of Systems   Constitutional:  Negative for fever.   HENT:  Negative for congestion.    Respiratory:  Negative for cough and shortness of breath.         Hemoptysis   Cardiovascular:  Negative for chest pain.   Gastrointestinal:  Negative for abdominal distention and abdominal pain.   Genitourinary:  Negative for dysuria.   Musculoskeletal:  Negative for back pain.   Neurological:  Negative for headaches.     Objective:     Vital Signs (Most Recent):  Temp: 98.4 °F (36.9 °C) (05/23/25 1149)  Pulse: 63 (05/23/25 1149)  Resp: 18 (05/23/25 1149)  BP: (!) 169/80 (05/23/25 1149)  SpO2: (!) 93 % (05/23/25 1149) Vital Signs (24h Range):  Temp:  [98.2 °F (36.8 °C)-99.9 °F (37.7 °C)] 98.4 °F (36.9 °C)  Pulse:  [63-72] 63  Resp:  [14-20] 18  SpO2:  [93 %-99 %] 93 %  BP: (148-194)/(68-89) 169/80     Weight: 90.7 kg (200 lb)  Body mass index is 26.39 kg/m².    Intake/Output Summary (Last 24 hours) at 5/23/2025 1306  Last data filed at 5/23/2025 0630  Gross per 24 hour   Intake 848.52 ml   Output 325 ml   Net 523.52 ml         Physical Exam  Constitutional:       General: He is not in acute distress.     Appearance: Normal appearance. He is not ill-appearing.   HENT:      Head: Normocephalic and atraumatic.      Nose: No congestion.   Eyes:      Extraocular Movements: Extraocular movements intact.      Conjunctiva/sclera: Conjunctivae normal.   Cardiovascular:      Rate and Rhythm: Normal rate. Rhythm irregular.      Pulses: Normal pulses.      Heart sounds: Normal heart sounds.   Pulmonary:      Effort: Pulmonary effort is normal. No respiratory distress.      Breath sounds: Rhonchi (Left) present.   Abdominal:      General: There is no distension.      Palpations: Abdomen is soft.      Tenderness: There is no abdominal tenderness.   Musculoskeletal:      Right lower leg: Edema present.      Left lower leg: Edema  present.   Skin:     General: Skin is warm.   Neurological:      General: No focal deficit present.      Mental Status: He is alert and oriented to person, place, and time.   Psychiatric:         Mood and Affect: Mood normal.         Thought Content: Thought content normal.               Significant Labs: All pertinent labs within the past 24 hours have been reviewed.  CBC:   Recent Labs   Lab 05/22/25  1153 05/22/25  1216 05/23/25  0308   WBC 12.64  --  12.77*   HGB 9.0*  --  8.7*   HCT 30.5* 33* 28.7*   *  --  123*     CMP:   Recent Labs   Lab 05/22/25  1153 05/23/25  0309   * 130*   K 4.1 4.6   CL 98 98   CO2 22* 20*   GLU 80 129*   BUN 55* 60*   CREATININE 3.0* 3.1*   CALCIUM 8.8 8.4*   PROT 6.1 5.7*   ALBUMIN 2.9* 2.6*   BILITOT 0.6 0.8   ALKPHOS 54 52   AST 13 14   ALT <5* <5*   ANIONGAP 12 12       Significant Imaging: I have reviewed all pertinent imaging results/findings within the past 24 hours.    Echo  Result Date: 5/23/2025    Left Ventricle: The left ventricle is normal in size. Moderately   increased ventricular mass. Increased wall thickness. Mild septal   thickening. There is moderate concentric hypertrophy. Mild global   hypokinesis and regional wall motion abnormalities present. See diagram   for wall motion findings. There is mildly reduced systolic function with a   visually estimated ejection fraction of 45 - 50%. Ejection fraction is   approximately 48%. Quantitated ejection fraction is 45%. There is   indeterminate diastolic function.    Right Ventricle: The right ventricle is normal in size Wall thickness   is normal. Systolic function is normal.    Left Atrium: The left atrium is severely dilated    Aortic Valve: There is mild aortic valve sclerosis. There is mild to   mild-moderate aortic regurgitation.    Mitral Valve: There is mild regurgitation.    Tricuspid Valve: There is mild regurgitation.    Pulmonary Artery: There is mild pulmonary hypertension. The estimated    pulmonary artery systolic pressure is 46 mmHg.    IVC/SVC: Elevated venous pressure at 15 mmHg.            Assessment & Plan  Left upper lobe consolidation  Hemoptysis  Has hx of recurrent pneumonia with multiple hospitalizations, most recently discharged 5/10/25 for RLL pneumonia. [place] [facility] [place], subjective fevers/chills, hemoptysis, and new SUAD consolidation seen on Xray. Initial labs with borderline leukocytosis (12.6k), Na 132. No recent respiratory cultures. Negative resp infection panels in March and early May 2025. Legionella antigen negative 5/2/25.     SLP contacted this admission who state no need for barium swallow but if we're concerned for aspiration to contact GI. Will defer for now. CT chest 5/23 with L upper lobe consolidation and resolution of RLL consolidation. Transplant ID consulted who recommend bronch with pulm.       Plan:  -- TB r/o given immunosuppressed and recurrent pna   -- continue vanc/cefepime given recent hospitalization   -- azithro added, pending MRSA nares  -- f/u respiratory  culture  -- f/u blood cultures  -- f/u with transplant ID and pulm    Type 2 diabetes mellitus with stage 4 chronic kidney disease, with long-term current use of insulin  Insulin pump in place  Creatine stable for now. BMP reviewed- noted Estimated Creatinine Clearance: 26.1 mL/min (A) (based on SCr of 3.1 mg/dL (H)). according to latest data. Based on current GFR, CKD stage is stage 4 - GFR 15-29.  Monitor UOP and serial BMP and adjust therapy as needed. Renally dose meds. Avoid nephrotoxic medications and procedures.    Plan:  -- has home insulin pump  -- Consult endocrine  -- POC glucose ACHS      Hyperlipidemia  Continue home lipitor 8omg    Chronic combined systolic (congestive) and diastolic (congestive) heart failure  Elevated brain natriuretic peptide (BNP) level  Patient has Combined Systolic and Diastolic heart failure that is Chronic. On presentation their CHF was well  compensated. Most recent BNP and echo results are listed below.  Recent Labs     05/22/25  1153   BNP 1,534*     Echo  Result Date: 5/23/2025    Left Ventricle: The left ventricle is normal in size. Moderately   increased ventricular mass. Increased wall thickness. Mild septal   thickening. There is moderate concentric hypertrophy. Mild global   hypokinesis and regional wall motion abnormalities present. See diagram   for wall motion findings. There is mildly reduced systolic function with a   visually estimated ejection fraction of 45 - 50%. Ejection fraction is   approximately 48%. Quantitated ejection fraction is 45%. There is   indeterminate diastolic function.    Right Ventricle: The right ventricle is normal in size Wall thickness   is normal. Systolic function is normal.    Left Atrium: The left atrium is severely dilated    Aortic Valve: There is mild aortic valve sclerosis. There is mild to   mild-moderate aortic regurgitation.    Mitral Valve: There is mild regurgitation.    Tricuspid Valve: There is mild regurgitation.    Pulmonary Artery: There is mild pulmonary hypertension. The estimated   pulmonary artery systolic pressure is 46 mmHg.    IVC/SVC: Elevated venous pressure at 15 mmHg.        Current Heart Failure Medications  hydrALAZINE tablet 100 mg, Every 8 hours, Oral  empagliflozin (Jardiance) tablet 10 mg, Daily, Oral    BNP 1500, similar to prior admission    Plan  - Monitor strict I&Os and daily weights.    - Place on telemetry  - Low sodium diet  - Place on fluid restriction of 1.5 L.   - Will trial IV lasix 60mg and monitor UOP  S/P cadaveric kidney transplant 11/5/2016. ESRD secondary to HTN/DMII  Immunocompromised state due to drug therapy  Prophylactic immunotherapy  PHUONG (acute kidney injury)  KTM consulted. Unclear baseline. Cr 2.5 3 weeks ago, now 3.0    - avoid nephrotoxic agents  - follow up ktm recs    Persistent atrial fibrillation  Anticoagulant long-term use  Patient has permanent  atrial fibrillation. Patient is currently in atrial fibrillation. [id number] Score: 3. The patients heart rate in the last 24 hours is as follows:  Pulse  Min: 63  Max: 72     Antiarrhythmics       Anticoagulants  rivaroxaban tablet 15 mg, With dinner, Oral    Plan  - Replete lytes with a goal of K>4, Mg >2  - Patient is anticoagulated, see medications listed above.  - Patient's afib is currently controlled    BPH with urinary obstruction  Continue doxazosin    Elevated troponin  Chronically elevated. At baseline    PAD (peripheral artery disease)  Continue home lipitor    Anemia of chronic disease  Anemia is likely due to nutritional deficiency. Most recent hemoglobin and hematocrit are listed below.  Recent Labs     05/22/25  1153 05/22/25  1216 05/23/25  0308   HGB 9.0*  --  8.7*   HCT 30.5* 33* 28.7*     Plan  - Monitor serial CBC: Daily  - Transfuse PRBC if patient becomes hemodynamically unstable, symptomatic or H/H drops below 7/21.  - Patient has not received any PRBC transfusions to date  - Patient's anemia is currently being monitored    Embolic stroke involving right middle cerebral artery  Historic. No residual deficits. Remains on rivaroxaban and lipitor.    Hyponatremia  Hyponatremia is likely due to Heart Failure and/or HCTZ use. The patient's most recent sodium results are listed below.  Recent Labs     05/22/25  1153 05/23/25  0309   * 130*     Plan  - monitor with daily cmp    VTE Risk Mitigation (From admission, onward)           Ordered     rivaroxaban tablet 15 mg  With dinner         05/22/25 1425                    Discharge Planning   UBALDO: 5/25/2025     Code Status: Full Code   Medical Readiness for Discharge Date:                            Sarahi Cano MD  Department of Hospital Medicine   [facility] - Internal Medicine Telemetry

## 2025-05-23 NOTE — ASSESSMENT & PLAN NOTE
Hyponatremia is likely due to Heart Failure and/or HCTZ use. The patient's most recent sodium results are listed below.  Recent Labs     05/22/25  1153 05/23/25  0309   * 130*     Plan  - monitor with daily cmp

## 2025-05-24 LAB
ABSOLUTE EOSINOPHIL (OHS): 0.09 K/UL
ABSOLUTE MONOCYTE (OHS): 1.01 K/UL (ref 0.3–1)
ABSOLUTE NEUTROPHIL COUNT (OHS): 3.89 K/UL (ref 1.8–7.7)
ALBUMIN SERPL BCP-MCNC: 2.6 G/DL (ref 3.5–5.2)
ALP SERPL-CCNC: 53 UNIT/L (ref 40–150)
ALT SERPL W/O P-5'-P-CCNC: <5 UNIT/L (ref 10–44)
ANION GAP (OHS): 10 MMOL/L (ref 8–16)
AST SERPL-CCNC: 12 UNIT/L (ref 11–45)
B PERT DNA NPH QL NAA+PROBE: NOT DETECTED
BASOPHILS # BLD AUTO: 0.04 K/UL
BASOPHILS NFR BLD AUTO: 0.6 %
BILIRUB SERPL-MCNC: 0.7 MG/DL (ref 0.1–1)
BUN SERPL-MCNC: 63 MG/DL (ref 8–23)
C PNEUM DNA LOWER RESP QL NAA+NON-PROBE: NOT DETECTED
CALCIUM SERPL-MCNC: 8.7 MG/DL (ref 8.7–10.5)
CHLORIDE SERPL-SCNC: 102 MMOL/L (ref 95–110)
CO2 SERPL-SCNC: 22 MMOL/L (ref 23–29)
CREAT SERPL-MCNC: 3 MG/DL (ref 0.5–1.4)
ERYTHROCYTE [DISTWIDTH] IN BLOOD BY AUTOMATED COUNT: 18.9 % (ref 11.5–14.5)
FLUAV RNA NPH QL NAA+NON-PROBE: NOT DETECTED
FLUBV RNA NPH QL NAA+NON-PROBE: NOT DETECTED
GFR SERPLBLD CREATININE-BSD FMLA CKD-EPI: 22 ML/MIN/1.73/M2
GLUCOSE SERPL-MCNC: 108 MG/DL (ref 70–110)
HADV DNA NPH QL NAA+NON-PROBE: NOT DETECTED
HCOV 229E RNA NPH QL NAA+NON-PROBE: NOT DETECTED
HCOV HKU1 RNA NPH QL NAA+NON-PROBE: NOT DETECTED
HCOV NL63 RNA NPH QL NAA+NON-PROBE: NOT DETECTED
HCOV OC43 RNA NPH QL NAA+NON-PROBE: NOT DETECTED
HCT VFR BLD AUTO: 27.6 % (ref 40–54)
HGB BLD-MCNC: 8.4 GM/DL (ref 14–18)
HMPV RNA LOWER RESP QL NAA+NON-PROBE: NOT DETECTED
HMPV RNA NPH QL NAA+NON-PROBE: NOT DETECTED
HPIV1 RNA NPH QL NAA+NON-PROBE: NOT DETECTED
HPIV2 RNA NPH QL NAA+NON-PROBE: NOT DETECTED
HPIV3 RNA NPH QL NAA+NON-PROBE: NOT DETECTED
HPIV4 RNA NPH QL NAA+NON-PROBE: NOT DETECTED
IMM GRANULOCYTES # BLD AUTO: 0.02 K/UL (ref 0–0.04)
IMM GRANULOCYTES NFR BLD AUTO: 0.3 % (ref 0–0.5)
LYMPHOCYTES # BLD AUTO: 1.2 K/UL (ref 1–4.8)
MAGNESIUM SERPL-MCNC: 2.2 MG/DL (ref 1.6–2.6)
MCH RBC QN AUTO: 22.8 PG (ref 27–31)
MCHC RBC AUTO-ENTMCNC: 30.4 G/DL (ref 32–36)
MCV RBC AUTO: 75 FL (ref 82–98)
MITOGEN MINUS NIL (OHS): 9.92
NIL TB SYNCED (OHS): 0.08
NUCLEATED RBC (/100WBC) (OHS): 0 /100 WBC
PHOSPHATE SERPL-MCNC: 3.8 MG/DL (ref 2.7–4.5)
PLATELET # BLD AUTO: 121 K/UL (ref 150–450)
PMV BLD AUTO: ABNORMAL FL
POCT GLUCOSE: 115 MG/DL (ref 70–110)
POCT GLUCOSE: 133 MG/DL (ref 70–110)
POCT GLUCOSE: 162 MG/DL (ref 70–110)
POCT GLUCOSE: 186 MG/DL (ref 70–110)
POTASSIUM SERPL-SCNC: 3.9 MMOL/L (ref 3.5–5.1)
PROT SERPL-MCNC: 5.9 GM/DL (ref 6–8.4)
QUANTIFERON GOLD INTERP (OHS): POSITIVE
RBC # BLD AUTO: 3.69 M/UL (ref 4.6–6.2)
RELATIVE EOSINOPHIL (OHS): 1.4 %
RELATIVE LYMPHOCYTE (OHS): 19.2 % (ref 18–48)
RELATIVE MONOCYTE (OHS): 16.2 % (ref 4–15)
RELATIVE NEUTROPHIL (OHS): 62.3 % (ref 38–73)
RSV RNA NPH QL NAA+NON-PROBE: NOT DETECTED
RSV RNA NPH QL NAA+NON-PROBE: NOT DETECTED
RV+EV RNA NPH QL NAA+NON-PROBE: NOT DETECTED
SARS-COV-2 RNA RESP QL NAA+PROBE: NOT DETECTED
SODIUM SERPL-SCNC: 134 MMOL/L (ref 136–145)
SPECIMEN SOURCE: NORMAL
TACROLIMUS BLD-MCNC: 5.9 NG/ML (ref 5–15)
TB1 AG MINUS NIL (OHS): 0.79
TB2 AG MINUS NIL (OHS): 1.04
WBC # BLD AUTO: 6.25 K/UL (ref 3.9–12.7)

## 2025-05-24 PROCEDURE — 25000003 PHARM REV CODE 250: Mod: HCNC

## 2025-05-24 PROCEDURE — 63600175 PHARM REV CODE 636 W HCPCS: Mod: HCNC

## 2025-05-24 PROCEDURE — 83735 ASSAY OF MAGNESIUM: CPT | Mod: HCNC

## 2025-05-24 PROCEDURE — 84100 ASSAY OF PHOSPHORUS: CPT | Mod: HCNC

## 2025-05-24 PROCEDURE — 36415 COLL VENOUS BLD VENIPUNCTURE: CPT | Mod: HCNC

## 2025-05-24 PROCEDURE — 99232 SBSQ HOSP IP/OBS MODERATE 35: CPT | Mod: HCNC,,,

## 2025-05-24 PROCEDURE — 80053 COMPREHEN METABOLIC PANEL: CPT | Mod: HCNC

## 2025-05-24 PROCEDURE — 80197 ASSAY OF TACROLIMUS: CPT | Mod: HCNC

## 2025-05-24 PROCEDURE — 0202U NFCT DS 22 TRGT SARS-COV-2: CPT | Mod: HCNC

## 2025-05-24 PROCEDURE — 85025 COMPLETE CBC W/AUTO DIFF WBC: CPT | Mod: HCNC

## 2025-05-24 PROCEDURE — 27000207 HC ISOLATION: Mod: HCNC

## 2025-05-24 PROCEDURE — 25000003 PHARM REV CODE 250: Mod: HCNC | Performed by: INTERNAL MEDICINE

## 2025-05-24 PROCEDURE — 21400001 HC TELEMETRY ROOM: Mod: HCNC

## 2025-05-24 PROCEDURE — 94761 N-INVAS EAR/PLS OXIMETRY MLT: CPT | Mod: HCNC

## 2025-05-24 PROCEDURE — 99232 SBSQ HOSP IP/OBS MODERATE 35: CPT | Mod: HCNC,GC,, | Performed by: INTERNAL MEDICINE

## 2025-05-24 RX ORDER — LEVOFLOXACIN 750 MG/1
750 TABLET, FILM COATED ORAL EVERY OTHER DAY
Status: DISCONTINUED | OUTPATIENT
Start: 2025-05-24 | End: 2025-05-25 | Stop reason: HOSPADM

## 2025-05-24 RX ORDER — FUROSEMIDE 10 MG/ML
60 INJECTION INTRAMUSCULAR; INTRAVENOUS ONCE
Status: DISCONTINUED | OUTPATIENT
Start: 2025-05-24 | End: 2025-05-24

## 2025-05-24 RX ORDER — FUROSEMIDE 10 MG/ML
40 INJECTION INTRAMUSCULAR; INTRAVENOUS ONCE
Status: COMPLETED | OUTPATIENT
Start: 2025-05-24 | End: 2025-05-24

## 2025-05-24 RX ADMIN — ATORVASTATIN CALCIUM 80 MG: 40 TABLET, FILM COATED ORAL at 11:05

## 2025-05-24 RX ADMIN — DOXAZOSIN 8 MG: 2 TABLET ORAL at 11:05

## 2025-05-24 RX ADMIN — TACROLIMUS 1 MG: 1 CAPSULE ORAL at 11:05

## 2025-05-24 RX ADMIN — LEVOFLOXACIN 750 MG: 750 TABLET, FILM COATED ORAL at 11:05

## 2025-05-24 RX ADMIN — TACROLIMUS 1 MG: 1 CAPSULE ORAL at 05:05

## 2025-05-24 RX ADMIN — GABAPENTIN 100 MG: 100 CAPSULE ORAL at 02:05

## 2025-05-24 RX ADMIN — GABAPENTIN 100 MG: 100 CAPSULE ORAL at 11:05

## 2025-05-24 RX ADMIN — FUROSEMIDE 40 MG: 10 INJECTION, SOLUTION INTRAMUSCULAR; INTRAVENOUS at 11:05

## 2025-05-24 RX ADMIN — RIVAROXABAN 15 MG: 10 TABLET, FILM COATED ORAL at 05:05

## 2025-05-24 RX ADMIN — HYDRALAZINE HYDROCHLORIDE 100 MG: 25 TABLET ORAL at 02:05

## 2025-05-24 RX ADMIN — GABAPENTIN 200 MG: 100 CAPSULE ORAL at 09:05

## 2025-05-24 RX ADMIN — PREDNISONE 5 MG: 5 TABLET ORAL at 11:05

## 2025-05-24 RX ADMIN — HYDRALAZINE HYDROCHLORIDE 100 MG: 25 TABLET ORAL at 06:05

## 2025-05-24 RX ADMIN — EMPAGLIFLOZIN 10 MG: 10 TABLET, FILM COATED ORAL at 11:05

## 2025-05-24 RX ADMIN — GABAPENTIN 100 MG: 100 CAPSULE ORAL at 09:05

## 2025-05-24 RX ADMIN — HYDRALAZINE HYDROCHLORIDE 100 MG: 25 TABLET ORAL at 09:05

## 2025-05-24 NOTE — PLAN OF CARE
Problem: Adult Inpatient Plan of Care  Goal: Plan of Care Review  Outcome: Progressing  Flowsheets (Taken 5/24/2025 4536)  Plan of Care Reviewed With: patient  Goal: Patient-Specific Goal (Individualized)  Outcome: Progressing  Goal: Absence of Hospital-Acquired Illness or Injury  Outcome: Progressing  Goal: Optimal Comfort and Wellbeing  Outcome: Progressing  Goal: Readiness for Transition of Care  Outcome: Progressing     Problem: Diabetes Comorbidity  Goal: Blood Glucose Level Within Targeted Range  Outcome: Progressing  Intervention: Monitor and Manage Glycemia  Flowsheets (Taken 5/24/2025 0815)  Glycemic Management: (insulin pump in place)   blood glucose monitored   other (see comments)     Problem: Acute Kidney Injury/Impairment  Goal: Fluid and Electrolyte Balance  Outcome: Progressing  Intervention: Monitor and Manage Fluid and Electrolyte Balance  Flowsheets (Taken 5/24/2025 0815)  Fluid/Electrolyte Management: (labs monitored) other (see comments)  Goal: Improved Oral Intake  Outcome: Progressing  Intervention: Promote and Optimize Oral Intake  Flowsheets (Taken 5/24/2025 0815)  Nutrition Interventions: food preferences provided  Goal: Effective Renal Function  Outcome: Progressing     Problem: Pneumonia  Goal: Fluid Balance  Outcome: Progressing  Intervention: Monitor and Manage Fluid Balance  Flowsheets (Taken 5/24/2025 0815)  Fluid/Electrolyte Management: (labs monitored) other (see comments)  Goal: Resolution of Infection Signs and Symptoms  Outcome: Progressing  Goal: Effective Oxygenation and Ventilation  Outcome: Progressing  Intervention: Promote Airway Secretion Clearance  Flowsheets (Taken 5/24/2025 0815)  Breathing Techniques/Airway Clearance: deep/controlled cough encouraged  Intervention: Optimize Oxygenation and Ventilation  Flowsheets (Taken 5/24/2025 0815)  Airway/Ventilation Management: airway patency maintained

## 2025-05-24 NOTE — ASSESSMENT & PLAN NOTE
Hyponatremia is likely due to Heart Failure and/or HCTZ use. The patient's most recent sodium results are listed below.  Recent Labs     05/22/25  1153 05/23/25  0309 05/24/25  0632   * 130* 134*     Plan  - monitor with daily cmp

## 2025-05-24 NOTE — ASSESSMENT & PLAN NOTE
Has hx of recurrent pneumonia with multiple hospitalizations, most recently discharged 5/10/25 for RLL pneumonia. [place] [facility] [place] with cough, subjective fevers/chills, hemoptysis, and new SUAD consolidation seen on Xray. Initial labs with borderline leukocytosis (12.6k), Na 132. No recent respiratory cultures. Negative resp infection panels in March and early May 2025. Legionella antigen negative 5/2/25.     SLP contacted this admission who state no need for barium swallow but if we're concerned for aspiration to contact GI. Will defer for now. CT chest 5/23 with L upper lobe consolidation and resolution of RLL consolidation. Transplant ID consulted who recommend bronch with pulm.       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

## 2025-05-24 NOTE — SUBJECTIVE & OBJECTIVE
"Interval HPI:   No acute events overnight. Patient in room 1110/1110 A. Blood glucose variable. BG at, above, and below goal on current insulin regimen (Home Insulin Pump). Steroid use- Prednisone .      Renal function- Abnormal -          Diet Heart Healthy Fluid - 1500mL      Eatin%  Nausea: No  Hypoglycemia and intervention: No  Fever: No  TPN and/or TF: No    BP (!) 169/74   Pulse 63   Temp 97.6 °F (36.4 °C) (Oral)   Resp 16   Ht 6' 1" (1.854 m)   Wt 90.7 kg (200 lb)   SpO2 96%   BMI 26.39 kg/m²     Labs Reviewed and Include    Recent Labs   Lab 25  0632      CALCIUM 8.7   ALBUMIN 2.6*   PROT 5.9*   *   K 3.9   CO2 22*      BUN 63*   CREATININE 3.0*   ALKPHOS 53   ALT <5*   AST 12   BILITOT 0.7     Lab Results   Component Value Date    WBC 6.25 2025    HGB 8.4 (L) 2025    HCT 27.6 (L) 2025    MCV 75 (L) 2025     (L) 2025     No results for input(s): "TSH", "FREET4" in the last 168 hours.  Lab Results   Component Value Date    HGBA1C 8.3 (H) 2025       Nutritional status:   Body mass index is 26.39 kg/m².  Lab Results   Component Value Date    ALBUMIN 2.6 (L) 2025    ALBUMIN 2.6 (L) 2025    ALBUMIN 2.9 (L) 2025     No results found for: "PREALBUMIN"    Estimated Creatinine Clearance: 27 mL/min (A) (based on SCr of 3 mg/dL (H)).    Accu-Checks  Recent Labs     25  1651 25  1942 25  0739 25  1149 25  1533 25  2043 25  0736   POCTGLUCOSE 132* 254* 131* 200* 180* 170* 115*       Current Medications and/or Treatments Impacting Glycemic Control  Immunotherapy:    Immunosuppressants           Stop Route Frequency     tacrolimus capsule 1 mg         -- Oral 2 times daily          Steroids:   Hormones (From admission, onward)      Start     Stop Route Frequency Ordered    25 0900  predniSONE tablet 5 mg         -- Oral Daily 25 5982          Pressors:    Autonomic Drugs " (From admission, onward)      None          Hyperglycemia/Diabetes Medications:   Antihyperglycemics (From admission, onward)      Start     Stop Route Frequency Ordered    05/22/25 1730  insulin aspart U-100 insulin pump from home 0.6 Units        Question Answer Comment   Target number 120    Basal Rate #1 0.6    Basal rate #1 time 0343-6740        -- SubQ Continuous 05/22/25 1508    05/22/25 1730  insulin aspart U-100 insulin pump from home 0-20 Units        Question Answer Comment   Target number 120    Carbohydrate coverage #1 1:7.5    Carbohydrate coverage #1 time 3607-3409    Sensitivity #1 1:30    Sensitivity #1 time 1274-1599        -- SubQ As needed (PRN) 05/22/25 1508    05/22/25 1530  empagliflozin (Jardiance) tablet 10 mg        Question Answer Comment   Does this patient have a diagnosis of heart failure? Yes    Does this patient have type 1 diabetes or diabetic ketoacidosis? No    Does this patient have symptomatic hypotension? No    Is the patient NPO or pending major surgery in next 3 days or less? No        -- Oral Daily 05/22/25 8069

## 2025-05-24 NOTE — PROGRESS NOTES
Pharmacist Renal Dose Adjustment Note    Ravi Zamorano is a 69 y.o. male being treated with the medication levofloxacin    Patient Data:    Vital Signs (Most Recent):  Temp: 98.2 °F (36.8 °C) (05/24/25 0443)  Pulse: 62 (05/24/25 0718)  Resp: 18 (05/24/25 0443)  BP: (!) 185/77 (05/24/25 0443)  SpO2: 96 % (05/24/25 0443) Vital Signs (72h Range):  Temp:  [97.5 °F (36.4 °C)-99.9 °F (37.7 °C)]   Pulse:  [62-80]   Resp:  [14-20]   BP: (137-194)/(63-89)   SpO2:  [93 %-99 %]      Recent Labs   Lab 05/22/25  1153 05/23/25  0309 05/24/25  0632   CREATININE 3.0* 3.1* 3.0*     Serum creatinine: 3 mg/dL (H) 05/24/25 0632  Estimated creatinine clearance: 27 mL/min (A)    Medication: levofloxacin 750 mg PO q24h will be changed to levofloxacin 750 mg PO q48h     Pharmacist's Name: Isabelle Stern  Pharmacist's Extension: [phone]

## 2025-05-24 NOTE — ASSESSMENT & PLAN NOTE
Patient has Combined Systolic and Diastolic heart failure that is Chronic. On presentation their CHF was well compensated. Most recent BNP and echo results are listed below.  Recent Labs     05/22/25  1153   BNP 1,534*     Echo  Result Date: 5/23/2025    Left Ventricle: The left ventricle is normal in size. Moderately   increased ventricular mass. Increased wall thickness. Mild septal   thickening. There is moderate concentric hypertrophy. Mild global   hypokinesis and regional wall motion abnormalities present. See diagram   for wall motion findings. There is mildly reduced systolic function with a   visually estimated ejection fraction of 45 - 50%. Ejection fraction is   approximately 48%. Quantitated ejection fraction is 45%. There is   indeterminate diastolic function.    Right Ventricle: The right ventricle is normal in size Wall thickness   is normal. Systolic function is normal.    Left Atrium: The left atrium is severely dilated    Aortic Valve: There is mild aortic valve sclerosis. There is mild to   mild-moderate aortic regurgitation.    Mitral Valve: There is mild regurgitation.    Tricuspid Valve: There is mild regurgitation.    Pulmonary Artery: There is mild pulmonary hypertension. The estimated   pulmonary artery systolic pressure is 46 mmHg.    IVC/SVC: Elevated venous pressure at 15 mmHg.        Current Heart Failure Medications  hydrALAZINE tablet 100 mg, Every 8 hours, Oral  empagliflozin (Jardiance) tablet 10 mg, Daily, Oral    BNP 1500, similar to prior admission    Plan  - Monitor strict I&Os and daily weights.    - Place on telemetry  - Low sodium diet  - Place on fluid restriction of 1.5 L.   - Will trial IV lasix 60mg and monitor UOP   - CVP 15 on admission echo   - continuing diuresis as tolerated

## 2025-05-24 NOTE — PROGRESS NOTES
"[facility] - [facility] Medicine Telemetry  Endocrinology  Progress Note    Admit Date: 2025     Reason for Consult: Management of T2DM, Hyperglycemia      Diabetes diagnosis year:       Home Diabetes Medications:    OMNIPOD 5  0.6 u/hr mn-0600 , 9p-mn, 0.7 u/hr 6a-9p   Target 120-130 mg/dl   Iob 3 hours  Max bolus 20 units   Max basal 2 u/hr   Isf 40 mn-mn  ICR 1 to 7.5      Presets:  Snack: 4 units (28g)  Small Meal: 8 units (56g)  Medium Meal: 12 units (90g)  Large Meal: 15 units (114g)  Super Large Meal: 18 units (130g)     How often checking glucose at home? >4 x day   BG readings on regimen: 150s  Hypoglycemia on the regimen?  No  Missed doses on regimen?  No     Diabetes Complications include:     Hyperglycemia     Complicating diabetes co morbidities:   S/p Kidney tx          HPI: his is a 69-year-old male with a history of DM, renal transplant , CHFpEF, AF/AFL (PVI/PWI/CTI 2024), CVA, recurrent PNA who presents with fevers, chills. States that symptoms are similar to last month when he was admitted for pneumonia. Was in his usual state of health until last night when he started experiencing intermittent fevers, chills. Received IV vancomycin, azithromycin, Zosyn.  Septic workup obtained. Endocrine conulsted to manage hyperglycemia, type 2 diabetes, and insulin pump therapy.    Lab Results   Component Value Date    HGBA1C 8.3 (H) 2025         Interval HPI:   No acute events overnight. Patient in room 1110/1110 A. Blood glucose variable. BG at, above, and below goal on current insulin regimen (Home Insulin Pump). Steroid use- Prednisone .      Renal function- Abnormal -          Diet Heart Healthy Fluid - 1500mL      Eatin%  Nausea: No  Hypoglycemia and intervention: No  Fever: No  TPN and/or TF: No    BP (!) 169/74   Pulse 63   Temp 97.6 °F (36.4 °C) (Oral)   Resp 16   Ht 6' 1" (1.854 m)   Wt 90.7 kg (200 lb)   SpO2 96%   BMI 26.39 kg/m²     Labs Reviewed and Include  " "  Recent Labs   Lab 05/24/25  0632      CALCIUM 8.7   ALBUMIN 2.6*   PROT 5.9*   *   K 3.9   CO2 22*      BUN 63*   CREATININE 3.0*   ALKPHOS 53   ALT <5*   AST 12   BILITOT 0.7     Lab Results   Component Value Date    WBC 6.25 05/24/2025    HGB 8.4 (L) 05/24/2025    HCT 27.6 (L) 05/24/2025    MCV 75 (L) 05/24/2025     (L) 05/24/2025     No results for input(s): "TSH", "FREET4" in the last 168 hours.  Lab Results   Component Value Date    HGBA1C 8.3 (H) 03/27/2025       Nutritional status:   Body mass index is 26.39 kg/m².  Lab Results   Component Value Date    ALBUMIN 2.6 (L) 05/24/2025    ALBUMIN 2.6 (L) 05/23/2025    ALBUMIN 2.9 (L) 05/22/2025     No results found for: "PREALBUMIN"    Estimated Creatinine Clearance: 27 mL/min (A) (based on SCr of 3 mg/dL (H)).    Accu-Checks  Recent Labs     05/22/25  1651 05/22/25  1942 05/23/25  0739 05/23/25  1149 05/23/25  1533 05/23/25  2043 05/24/25  0736   POCTGLUCOSE 132* 254* 131* 200* 180* 170* 115*       Current Medications and/or Treatments Impacting Glycemic Control  Immunotherapy:    Immunosuppressants           Stop Route Frequency     tacrolimus capsule 1 mg         -- Oral 2 times daily          Steroids:   Hormones (From admission, onward)      Start     Stop Route Frequency Ordered    05/23/25 0900  predniSONE tablet 5 mg         -- Oral Daily 05/22/25 1849          Pressors:    Autonomic Drugs (From admission, onward)      None          Hyperglycemia/Diabetes Medications:   Antihyperglycemics (From admission, onward)      Start     Stop Route Frequency Ordered    05/22/25 1730  insulin aspart U-100 insulin pump from home 0.6 Units        Question Answer Comment   Target number 120    Basal Rate #1 0.6    Basal rate #1 time 9128-2660        -- SubQ Continuous 05/22/25 1508    05/22/25 1730  insulin aspart U-100 insulin pump from home 0-20 Units        Question Answer Comment   Target number 120    Carbohydrate coverage #1 1:7.5  "   Carbohydrate coverage #1 time 5747-4489    Sensitivity #1 1:30    Sensitivity #1 time 8030-8311        -- SubQ As needed (PRN) 05/22/25 1508    05/22/25 1530  empagliflozin (Jardiance) tablet 10 mg        Question Answer Comment   Does this patient have a diagnosis of heart failure? Yes    Does this patient have type 1 diabetes or diabetic ketoacidosis? No    Does this patient have symptomatic hypotension? No    Is the patient NPO or pending major surgery in next 3 days or less? No        -- Oral Daily 05/22/25 1425            ASSESSMENT and PLAN    Pulmonary  * Left upper lobe consolidation  Infection may elevate BG readings  On IV antibiotics  Managed per primary team  Avoid hypoglycemia        Endocrine  Insulin pump in place  At time of evaluation, pt meets criteria to continue home insulin pump usage.  - Has all adequate supplies   - Insulin pump site change on 05.20.2025.    - Bolus settings reviewed    - No changes to home regimen.   - Nurse to check BG qac/hs/0200 & record in epic   - Patient to input glucose into pump and use bolus wizard for prandial needs   - Will continue to monitor accuchecks and titrate insulin as clinically indicated .     - Discussed above plan with patient, patient verbalized understanding.   - Understands in case of pump malfunction or cognitive decline in which pt can no longer safely use insulin pump, will transition to SC MDI        If pump malfunctions or is disconnected, PLEASE CALL ENDOCRINE ON-CALL.        Type 2 diabetes mellitus with stage 4 chronic kidney disease, with long-term current use of insulin  BG goal 140-180  T2DM.  CKD. Omnipod 5.  Recurrent pneumonia.    - Home Insulin Pump  - BG checks AC/HS/0200  - Hypoglycemia protocol in place  - If blood glucose greater than 300, please ask patient not to eat food or drink anything other than water until correctional insulin has brought it back below 250    ** Please notify Endocrine for any change and/or advance in  diet**  ** Please call Endocrine for any BG related issues **    Discharge Planning:   TBD. Please notify endocrinology prior to discharge.          Malina Slaughter PA-C  Endocrinology  [facility] Medicine Tele[facility]

## 2025-05-24 NOTE — ASSESSMENT & PLAN NOTE
Has hx of recurrent pneumonia with multiple hospitalizations, most recently discharged 5/10/25 for RLL pneumonia. Presented to [facility] ED with cough, subjective fevers/chills, hemoptysis, and new SUAD consolidation seen on Xray. Initial labs with borderline leukocytosis (12.6k), Na 132. No recent respiratory cultures. Negative resp infection panels in March and early May 2025. Legionella antigen negative 5/2/25.     SLP contacted this admission who state no need for barium swallow but if we're concerned for aspiration to contact GI. Will defer for now. CT chest 5/23 with L upper lobe consolidation and resolution of RLL consolidation. Transplant ID consulted who recommend bronch with pulm.       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

## 2025-05-24 NOTE — ASSESSMENT & PLAN NOTE
Patient has permanent atrial fibrillation. Patient is currently in atrial fibrillation. [id number] Score: 3. The patients heart rate in the last 24 hours is as follows:  Pulse  Min: 62  Max: 68     Antiarrhythmics       Anticoagulants  rivaroxaban tablet 15 mg, With dinner, Oral    Plan  - Replete lytes with a goal of K>4, Mg >2  - Patient is anticoagulated, see medications listed above.  - Patient's afib is currently controlled

## 2025-05-24 NOTE — SUBJECTIVE & OBJECTIVE
Interval History: NAEON. Patient reports breathing well this AM, continued improvement. He said he had good UOP yesterday with IV lasix, but no urine output documented. Cr same as yesterday, continuing diuresis today.    Review of Systems  Objective:     Vital Signs (Most Recent):  Temp: 97.6 °F (36.4 °C) (05/24/25 1137)  Pulse: 63 (05/24/25 1137)  Resp: 19 (05/24/25 1137)  BP: (!) 195/86 (05/24/25 1137)  SpO2: 96 % (05/24/25 1137) Vital Signs (24h Range):  Temp:  [97.5 °F (36.4 °C)-98.4 °F (36.9 °C)] 97.6 °F (36.4 °C)  Pulse:  [62-68] 63  Resp:  [16-19] 19  SpO2:  [95 %-96 %] 96 %  BP: (150-195)/(67-86) 195/86     Weight: 90.7 kg (200 lb)  Body mass index is 26.39 kg/m².    Intake/Output Summary (Last 24 hours) at 5/24/2025 1257  Last data filed at 5/24/2025 1134  Gross per 24 hour   Intake 750 ml   Output 1400 ml   Net -650 ml         Physical Exam  Constitutional:       General: He is not in acute distress.     Appearance: Normal appearance. He is not ill-appearing.   HENT:      Head: Normocephalic and atraumatic.      Nose: No congestion.   Eyes:      Extraocular Movements: Extraocular movements intact.      Conjunctiva/sclera: Conjunctivae normal.   Cardiovascular:      Rate and Rhythm: Normal rate. Rhythm irregular.      Pulses: Normal pulses.      Heart sounds: Normal heart sounds.   Pulmonary:      Effort: Pulmonary effort is normal. No respiratory distress.      Breath sounds: Rhonchi (Left) present.   Abdominal:      General: There is no distension.      Palpations: Abdomen is soft.      Tenderness: There is no abdominal tenderness.   Musculoskeletal:      Right lower leg: Edema (improved) present.      Left lower leg: Edema present.   Skin:     General: Skin is warm.   Neurological:      General: No focal deficit present.      Mental Status: He is alert and oriented to person, place, and time.   Psychiatric:         Mood and Affect: Mood normal.         Thought Content: Thought content normal.                Significant Labs: All pertinent labs within the past 24 hours have been reviewed.  CBC:   Recent Labs   Lab 05/23/25  0308 05/24/25  0632   WBC 12.77* 6.25   HGB 8.7* 8.4*   HCT 28.7* 27.6*   * 121*     CMP:   Recent Labs   Lab 05/23/25  0309 05/24/25  0632   * 134*   K 4.6 3.9   CL 98 102   CO2 20* 22*   * 108   BUN 60* 63*   CREATININE 3.1* 3.0*   CALCIUM 8.4* 8.7   PROT 5.7* 5.9*   ALBUMIN 2.6* 2.6*   BILITOT 0.8 0.7   ALKPHOS 52 53   AST 14 12   ALT <5* <5*   ANIONGAP 12 10       Significant Imaging: I have reviewed all pertinent imaging results/findings within the past 24 hours.

## 2025-05-24 NOTE — ASSESSMENT & PLAN NOTE
Patient has permanent atrial fibrillation. Patient is currently in atrial fibrillation. [id number] Score: 3. The patients heart rate in the last 24 hours is as follows:  Pulse  Min: 62  Max: 68     Antiarrhythmics       Anticoagulants  rivaroxaban tablet 15 mg, With dinner, Oral    Plan  - Replete lytes with a goal of K>4, Mg >2  - Patient is anticoagulated, see medications listed above.  - Patient's afib is currently controlled

## 2025-05-24 NOTE — PROGRESS NOTES
Name: Ravi Zamorano  Medical Record Number: [id number]  Date of Service: 05/24/2025  Note By: Everett Zamora MD    RENAL TRANSPLANT INITIAL CONSULTATION NOTE    Reason for Consultation: Reassessment of kidney transplant recipient and management of immunosuppression.     ORGAN: RIGHT KIDNEY  Donor Type: donation after brain death  PHS Increased Risk: yes  Cold Ischemia: 314 mins  Induction Medications: thymoglobulin    History of Present Illness:  Ravi has ESRD presumed secondary to DM/HTN post  DDRT 11/5/16. his creatinines were in the 1.9-2 ranging late 2024, peaked at 3.0 2/20/25 and  recently have been 2.2-2.5. He presents with fever and noted to have RLL PNA 04/03/2025 .Sepsis protocol initiated and started on broad spectrum abx.Completed 7 day cefipime course on 5/5. At the time there was concern for aspiration pneumonia given his large hiatal hernia. FEES was performed but barium swallow was deferred.  evaluation of cough, fever, chills, mild hemoptysis, and lower back pain starting about 4am this morning. He ran 101F fever last night. Low back pain resolved with tylenol. His hemoptysis occurred 4 times this morning with about 1/2 teaspoon of blood each time   Home IS: tacrolimus 1/1,  mg bid ( Held since last hospital visit) and prednisone 5 mg    ?  Past Medical History:  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    CKD (chronic kidney disease) stage 4, GFR 15-29 ml/min 07/29/2014    Colon polyps 2014    Depression - situational 07/29/2014    Diabetes type 2 since 2000 07/29/2014    Diabetic neuropathy 07/29/2014    Encounter for blood transfusion     History of cardioversion 10/03/2019    History of hepatitis C, s/p successful Rx w/ SVR24 - 9/2017 07/29/2014    Genotype 1a, treatment naive 10/2014 liver biopsy - grade 1 / stage  1 Completed Harvoni w/ SVR    Hyperlipidemia 07/29/2014    Hypertension     Neuropathy     Organ transplant candidate 07/29/2014    Pancreatitis     S/P cadaveric kidney transplant 11/5/2016. ESRD secondary to HTN/DMII 11/05/2016    Type 2 diabetes mellitus with stage 3a chronic kidney disease, with long-term current use of insulin 07/29/2014       Past Surgical History:  Past Surgical History:   Procedure Laterality Date    ABLATION OF ARRHYTHMOGENIC FOCUS FOR ATRIAL FIBRILLATION N/A 6/11/2024    Procedure: Ablation atrial fibrillation;  Surgeon: Bronson Bowden MD;  Location: [facility] EP LAB;  Service: Cardiology;  Laterality: N/A;  AF, FELICIANO (cx if SR), PVI, RFA, Carto, Gen, GP, 3 Prep    ABLATION, ATRIAL FLUTTER, TYPICAL  6/11/2024    Procedure: Ablation, Atrial Flutter, Typical;  Surgeon: Bronson Bowden MD;  Location: [facility] EP LAB;  Service: Cardiology;;    APPENDECTOMY      BONE MARROW BIOPSY Left 6/26/2024    Procedure: Biopsy-bone marrow;  Surgeon: Bridger Zapata MD;  Location: [facility] ENDO (4TH FLR);  Service: Oncology;  Laterality: Left;  6/24-pt knows to hold aspirin and eliquis as instructed by hem/onc, precall complete-Kpvt    CARDIOVERSION  6/11/2024    Procedure: Cardioversion;  Surgeon: Bronson Bowden MD;  Location: [facility] EP LAB;  Service: Cardiology;;    CATARACT EXTRACTION W/  INTRAOCULAR LENS IMPLANT Right 3/13/2024    Procedure: EXTRACTION, CATARACT, WITH IOL INSERTION;  Surgeon: Jennifer Palacio MD;  Location: [facility] OR;  Service: Ophthalmology;  Laterality: Right;    CATARACT EXTRACTION W/  INTRAOCULAR LENS IMPLANT Left 4/10/2024    Procedure: EXTRACTION, CATARACT, WITH IOL INSERTION;  Surgeon: Jennifer Palacio MD;  Location: [facility] OR;  Service: Ophthalmology;  Laterality: Left;    COLONOSCOPY N/A 12/21/2020    Procedure: COLONOSCOPY;  Surgeon: Tico Bell MD;  Location: [facility] ENDO (4TH FLR);  Service: Endoscopy;  Laterality: N/A;  ok to hold eliquis per Dr. Valadez, see telephone encounter  11/13/2020-MS  covid test 12/18 [place]    ECHOCARDIOGRAM,TRANSESOPHAGEAL N/A 2/3/2025    Procedure: Transesophageal echo (FELICIANO) intra-procedure log documentation;  Surgeon: Grayson Lin III, MD;  Location: [facility] EP LAB;  Service: Cardiology;  Laterality: N/A;    KIDNEY TRANSPLANT      TRANSESOPHAGEAL ECHOCARDIOGRAPHY N/A 8/26/2019    Procedure: ECHOCARDIOGRAM, TRANSESOPHAGEAL;  Surgeon: [facility] Diagnostic Provider;  Location: [facility] EP LAB;  Service: Cardiology;  Laterality: N/A;    TRANSESOPHAGEAL ECHOCARDIOGRAPHY N/A 6/11/2024    Procedure: ECHOCARDIOGRAM, TRANSESOPHAGEAL;  Surgeon: Grayson Lin III, MD;  Location: [facility] EP LAB;  Service: Cardiology;  Laterality: N/A;    TREATMENT OF CARDIAC ARRHYTHMIA N/A 8/26/2019    Procedure: CARDIOVERSION;  Surgeon: Bronson Bowden MD;  Location: [facility] EP LAB;  Service: Cardiology;  Laterality: N/A;  AF, FELICIANO, DCCV, MAC, GP, 3 PREP    TREATMENT OF CARDIAC ARRHYTHMIA N/A 2/3/2025    Procedure: Cardioversion or Defibrillation;  Surgeon: Bronson Bowden MD;  Location: [facility] EP LAB;  Service: Cardiology;  Laterality: N/A;  AF, FELICIANO, DCCV, MAC, GP, 3 Prep       Family History:  Family History   Problem Relation Name Age of Onset    Diabetes Mother      Hypertension Mother      Heart disease Mother          CAD with PCI    Heart disease Father      Cancer Brother 2         one sister with breast cancer    Hypertension Brother 2         one sister with HTN and borderline DM    Stroke Neg Hx      Kidney disease Neg Hx      Colon cancer Neg Hx      Esophageal cancer Neg Hx         Social History:  Social History[1]    Home Medications:   Prescriptions Prior to Admission[2]    Inpatient Medications:  Scheduled Meds:   atorvastatin  80 mg Oral Daily    cloNIDine 0.1 mg/24 hr td ptwk  1 patch Transdermal Q7 Days    doxazosin  8 mg Oral Daily    empagliflozin  10 mg Oral Daily    ergocalciferol  50,000 Units Oral Q7 Days    gabapentin  100 mg Oral TID    And    gabapentin  200 mg  Oral QHS    hydrALAZINE  100 mg Oral Q8H    levoFLOXacin  750 mg Oral Every other day    polyethylene glycol  17 g Oral Daily    predniSONE  5 mg Oral Daily    rivaroxaban  15 mg Oral Daily with dinner    tacrolimus  1 mg Oral BID       Continuous Infusions:   insulin aspart U-100  0.6 Units Subcutaneous Continuous           PRN Meds:.  Current Facility-Administered Medications:     dextrose 50%, 12.5 g, Intravenous, PRN    dextrose 50%, 25 g, Intravenous, PRN    glucagon (human recombinant), 1 mg, Intramuscular, PRN    glucose, 16 g, Oral, PRN    glucose, 24 g, Oral, PRN    insulin aspart U-100, 0-20 Units, Subcutaneous, PRN    naloxone, 0.02 mg, Intravenous, PRN    sodium chloride 0.9%, 10 mL, Intravenous, Q12H PRN    Allergies:  Nifedipine    Review of Systems:  Reviewed. Pertinent positives and negatives included in HPI.    Physical Exam:  Vitals:  Vitals:    05/24/25 1137   BP: (!) 195/86   Pulse: 63   Resp: 19   Temp: 97.6 °F (36.4 °C)     Temp:  [97.5 °F (36.4 °C)-98.4 °F (36.9 °C)] 97.6 °F (36.4 °C)  Pulse:  [62-68] 63  Resp:  [16-19] 19  SpO2:  [95 %-96 %] 96 %  BP: (150-195)/(67-86) 195/86    No fever reported overnight    Exam  General: alert, conversational, NAD  HEENT: moist mucus membranes  Neck: no JVD  Respiratory: decreased breath sounds pulmonary bases bilateral, Ronchi left lung  Cardiovacular: RRR, No murmur appreciated  Gastrointestinal: Soft, non-tender   Renal allograft exam: No tenderness, no bruit   Extremities: right lower bilateral  edema, that is improving compared with yesterday     Labs:  Lab Results   Component Value Date    WBC 6.25 05/24/2025    HGB 8.4 (L) 05/24/2025    HCT 27.6 (L) 05/24/2025     (L) 05/24/2025    K 3.9 05/24/2025     05/24/2025    CO2 22 (L) 05/24/2025    BUN 63 (H) 05/24/2025    CREATININE 3.0 (H) 05/24/2025    EGFRNONAA 44.9 (A) 07/19/2022    CALCIUM 8.7 05/24/2025    PHOS 3.8 05/24/2025    MG 2.2 05/24/2025    ALBUMIN 2.6 (L) 05/24/2025    AST 12  05/24/2025    ALT <5 (L) 05/24/2025         Imaging Studies:  Reviewed.   Chest x ray Impression:     Detrimental interval change in the appearance of the chest since 04/29/2025 is appreciated, as the current examination demonstrates interval development of some airspace consolidation in the left upper lobe.  Continued demonstration of cardiomegaly.  Airspace consolidation in the right lower lung zone seen on 04/29/2025 has resolved.    CT chest Impression:     New left upper lobe perihilar airspace disease when compared to 05/01/2025.  A similar finding in the right lung on the a previous exam is substantially improved.  The appearance is most consistent with multifocal pneumonia.  Bilateral pleural effusions right greater than left, improved when compared to 05/01/2025.  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           Assessment/Plan:  69 y.o. male with ESRD presumably 2/2 HTN/T2DM s/p DDRT (11/5/2016) on tacrolimus//cellcept/pred, who is admitted for recurrent SUAD pneumonia.      ESRD 2/2 HTN/T2DM s/p cadaveric kidney transplant (11/5/16)  PHUONG on CKD4  - baseline Cr ~2's with eGFR ~20s  - suspect PHUONG 2/2 hemodynamic instability , poor appetite, fever  - Cr today 3.0  - Patient refers better appetite today   - Encourage fluids PO  - Monitoring I&O   - Lasix dose reduce today to 40 mg   -      Immunosuppression Management  - Last tac level 5.5 05/08/25  - Continue Prograf 1mg BID; goal trough level is 5-7 ng/ml  - Prograft level today 5.9  - Continue prednisone 5mg daily  - Due to recurrent pneumonias, will continue hold MMF    Anemia  - Hb currently 8.7 g/dl  - Tsat 30% (02/2025)  - last known ferritin in system 800s in 04/2024  - patient receives EPO injections outpatient  - continue to monitor    Hyperglycemia  - blood sugars have been 131 to 200   in the last 24 hours  - continue SSI    HTN  - BP  need better controlled    - continue to  monitor    Pneumonia   Respiratory infection panel PCR follow up results   Legionella Ag in urine follow up results   Quantiferon Gold TB in process   MRSA -   IgA , M , G normal range       Everett Zamora MD  [employer] Nephrology Fellow, PGY-4  Kidney Transplant Medicine         [1]   Social History  Socioeconomic History    Marital status:    Occupational History     Employer: [employer]   Tobacco Use    Smoking status: Former     Current packs/day: 0.00     Average packs/day: 0.5 packs/day for 32.0 years (16.0 ttl pk-yrs)     Types: Cigarettes     Start date: 1984     Quit date: 2016     Years since quittin.4    Smokeless tobacco: Never   Substance and Sexual Activity    Alcohol use: Yes     Comment: Pt reports occasional beers on Sundays. Pt reports drinking daily prior to ESRD diagnosis.    Drug use: No    Sexual activity: Yes     Partners: Female   Social History Narrative     for 14 years     for 38 years    2 children ages 41, 42     Social Drivers of Health     Financial Resource Strain: Low Risk  (2025)    Overall Financial Resource Strain (CARDIA)     Difficulty of Paying Living Expenses: Not hard at all   Food Insecurity: No Food Insecurity (2025)    Hunger Vital Sign     Worried About Running Out of Food in the Last Year: Never true     Ran Out of Food in the Last Year: Never true   Transportation Needs: No Transportation Needs (2025)    PRAPARE - Transportation     Lack of Transportation (Medical): No     Lack of Transportation (Non-Medical): No   Physical Activity: Sufficiently Active (2025)    Exercise Vital Sign     Days of Exercise per Week: 7 days     Minutes of Exercise per Session: 30 min   Recent Concern: Physical Activity - Insufficiently Active (2025)    Exercise Vital Sign     Days of Exercise per Week: 7 days     Minutes of Exercise per Session: 10 min   Stress: No Stress Concern Present (2025)    [employer] - Occupational Stress Questionnaire     Feeling of Stress : Only a little   Housing Stability: Low Risk  (5/23/2025)    Housing Stability Vital Sign     Unable to Pay for Housing in the Last Year: No     Homeless in the Last Year: No   [2]   Facility-Administered Medications Prior to Admission   Medication Dose Route Frequency Provider Last Rate Last Admin    epoetin tanya injection 4,000 Units  4,000 Units Subcutaneous Q7 Days          Medications Prior to Admission   Medication Sig Dispense Refill Last Dose/Taking    atorvastatin (LIPITOR) 80 MG tablet Take 1 tablet (80 mg total) by mouth once daily. 90 tablet 3 Taking    cloNIDine 0.1 mg/24 hr td ptwk (CATAPRES) 0.1 mg/24 hr Place 1 patch onto the skin every 7 days. 4 patch 11 5/20/2025    doxazosin (CARDURA) 8 MG Tab Take 1 tablet (8 mg total) by mouth once daily. 30 tablet 11 Taking    empagliflozin (JARDIANCE) 10 mg tablet Take 1 tablet (10 mg total) by mouth once daily. 90 tablet 0 Taking    eplerenone (INSPRA) 50 MG Tab Take 1 tablet (50 mg total) by mouth once daily. 30 tablet 11 Taking    ergocalciferol (ERGOCALCIFEROL) 50,000 unit Cap Take 1 capsule (50,000 Units total) by mouth every 7 days. (Patient taking differently: Take 50,000 Units by mouth every 7 days. Fridays) 4 capsule 6 Taking Differently    famotidine (PEPCID AC ORAL) Take 1 tablet by mouth daily as needed (Acid reflux).   Taking As Needed    gabapentin (NEURONTIN) 300 MG capsule Take 1 capsule (300 mg total) by mouth 2 (two) times daily. (Patient taking differently: Take 300 mg by mouth 3 (three) times daily.) 60 capsule 11 Taking Differently    hydrALAZINE (APRESOLINE) 100 MG tablet Take 1 tablet (100 mg total) by mouth every 8 (eight) hours. 270 tablet 3 Taking    hydroCHLOROthiazide (HYDRODIURIL) 25 MG tablet Take 1 tablet (25 mg total) by mouth once daily. 30 tablet 11 Taking    insulin aspart U-100 (NOVOLOG U-100 INSULIN ASPART) 100 unit/mL injection Use  "in pump, max daily 100 units. 30 mL 11 Taking    insulin pump cart,auto,BT,G6/7 (OMNIPOD 5 G6-G7 PODS, GEN 5,) Crtg Change every 48 hours. 45 each 3 Taking    magnesium oxide (MAG-OX) 400 mg (241.3 mg magnesium) tablet Take 1 tablet (400 mg total) by mouth 2 (two) times daily. 60 tablet 11 Taking    polyethylene glycol (MIRALAX) 17 gram PwPk Take 17 gm (mixed in liquid) by mouth every day.   Past Month    predniSONE (DELTASONE) 5 MG tablet Take 1 tablet (5 mg total) by mouth once daily. 30 tablet 11 Taking    rivaroxaban (XARELTO) 15 mg Tab Take 1 tablet (15 mg total) by mouth daily with dinner or evening meal. 30 tablet 11 Taking    tacrolimus (PROGRAF) 1 MG Cap Take 1 capsule (1 mg total) by mouth every 12 (twelve) hours. 60 capsule 11 Taking    valsartan (DIOVAN) 320 MG tablet Take 1 tablet (320 mg total) by mouth once daily. 90 tablet 3 Taking    blood sugar diagnostic Strp Use to check blood glucose 1 times daily, to use with insurance preferred meter 100 each 11     blood-glucose meter Misc Use to check blood glucose 1 times daily, to use with insurance preferred meter 1 each 0     blood-glucose sensor (DEXCOM G7 SENSOR) Kayla Change sensor every 10 days. 3 each 11     lancets 30 gauge Misc Use to check blood glucose 1 times daily, to use with insurance preferred meter 100 each 3     meclizine (ANTIVERT) 25 mg tablet Take 1 tablet (25 mg total) by mouth 3 (three) times daily as needed for Dizziness. 21 tablet 3 More than a month    [Paused] mycophenolate (CELLCEPT) 250 mg Cap Take 2 capsules (500 mg total) by mouth 2 (two) times daily. 120 capsule 11     pen needle, diabetic (BD ULTRA-FINE LO PEN NEEDLE) 32 gauge x 5/32" Ndle USE TO ADMINISTER INSULIN 4 TIMES DAILY. 400 each 3      "

## 2025-05-24 NOTE — ASSESSMENT & PLAN NOTE
Anemia is likely due to nutritional deficiency. Most recent hemoglobin and hematocrit are listed below.  Recent Labs     05/22/25  1153 05/22/25  1216 05/23/25  0308 05/24/25  0632   HGB 9.0*  --  8.7* 8.4*   HCT 30.5* 33* 28.7* 27.6*     Plan  - Monitor serial CBC: Daily  - Transfuse PRBC if patient becomes hemodynamically unstable, symptomatic or H/H drops below 7/21.  - Patient has not received any PRBC transfusions to date  - Patient's anemia is currently being monitored

## 2025-05-25 VITALS
HEART RATE: 64 BPM | HEIGHT: 73 IN | TEMPERATURE: 98 F | DIASTOLIC BLOOD PRESSURE: 82 MMHG | SYSTOLIC BLOOD PRESSURE: 177 MMHG | WEIGHT: 200 LBS | RESPIRATION RATE: 17 BRPM | BODY MASS INDEX: 26.51 KG/M2 | OXYGEN SATURATION: 99 %

## 2025-05-25 LAB
ABSOLUTE EOSINOPHIL (OHS): 0.04 K/UL
ABSOLUTE MONOCYTE (OHS): 1.03 K/UL (ref 0.3–1)
ABSOLUTE NEUTROPHIL COUNT (OHS): 2.94 K/UL (ref 1.8–7.7)
ALBUMIN SERPL BCP-MCNC: 2.6 G/DL (ref 3.5–5.2)
ALP SERPL-CCNC: 55 UNIT/L (ref 40–150)
ALT SERPL W/O P-5'-P-CCNC: 5 UNIT/L (ref 10–44)
ANION GAP (OHS): 11 MMOL/L (ref 8–16)
AST SERPL-CCNC: 12 UNIT/L (ref 11–45)
BASOPHILS # BLD AUTO: 0.03 K/UL
BASOPHILS NFR BLD AUTO: 0.6 %
BILIRUB SERPL-MCNC: 0.6 MG/DL (ref 0.1–1)
BUN SERPL-MCNC: 63 MG/DL (ref 8–23)
CALCIUM SERPL-MCNC: 8.8 MG/DL (ref 8.7–10.5)
CHLORIDE SERPL-SCNC: 102 MMOL/L (ref 95–110)
CO2 SERPL-SCNC: 22 MMOL/L (ref 23–29)
CREAT SERPL-MCNC: 3 MG/DL (ref 0.5–1.4)
ERYTHROCYTE [DISTWIDTH] IN BLOOD BY AUTOMATED COUNT: 18.8 % (ref 11.5–14.5)
GFR SERPLBLD CREATININE-BSD FMLA CKD-EPI: 22 ML/MIN/1.73/M2
GLUCOSE SERPL-MCNC: 123 MG/DL (ref 70–110)
HCT VFR BLD AUTO: 29 % (ref 40–54)
HGB BLD-MCNC: 8.8 GM/DL (ref 14–18)
IMM GRANULOCYTES # BLD AUTO: 0.02 K/UL (ref 0–0.04)
IMM GRANULOCYTES NFR BLD AUTO: 0.4 % (ref 0–0.5)
LYMPHOCYTES # BLD AUTO: 1.25 K/UL (ref 1–4.8)
MAGNESIUM SERPL-MCNC: 2.2 MG/DL (ref 1.6–2.6)
MCH RBC QN AUTO: 22.7 PG (ref 27–31)
MCHC RBC AUTO-ENTMCNC: 30.3 G/DL (ref 32–36)
MCV RBC AUTO: 75 FL (ref 82–98)
NUCLEATED RBC (/100WBC) (OHS): 0 /100 WBC
PHOSPHATE SERPL-MCNC: 3.3 MG/DL (ref 2.7–4.5)
PLATELET # BLD AUTO: 130 K/UL (ref 150–450)
PMV BLD AUTO: ABNORMAL FL
POCT GLUCOSE: 131 MG/DL (ref 70–110)
POTASSIUM SERPL-SCNC: 3.7 MMOL/L (ref 3.5–5.1)
PROT SERPL-MCNC: 6.1 GM/DL (ref 6–8.4)
RBC # BLD AUTO: 3.88 M/UL (ref 4.6–6.2)
RELATIVE EOSINOPHIL (OHS): 0.8 %
RELATIVE LYMPHOCYTE (OHS): 23.5 % (ref 18–48)
RELATIVE MONOCYTE (OHS): 19.4 % (ref 4–15)
RELATIVE NEUTROPHIL (OHS): 55.3 % (ref 38–73)
SODIUM SERPL-SCNC: 135 MMOL/L (ref 136–145)
TACROLIMUS BLD-MCNC: 8.1 NG/ML (ref 5–15)
WBC # BLD AUTO: 5.31 K/UL (ref 3.9–12.7)

## 2025-05-25 PROCEDURE — 99232 SBSQ HOSP IP/OBS MODERATE 35: CPT | Mod: HCNC,,, | Performed by: NURSE PRACTITIONER

## 2025-05-25 PROCEDURE — 63600175 PHARM REV CODE 636 W HCPCS: Mod: HCNC

## 2025-05-25 PROCEDURE — 25000003 PHARM REV CODE 250: Mod: HCNC

## 2025-05-25 PROCEDURE — 36415 COLL VENOUS BLD VENIPUNCTURE: CPT | Mod: HCNC

## 2025-05-25 RX ORDER — POTASSIUM CHLORIDE 750 MG/1
30 CAPSULE, EXTENDED RELEASE ORAL ONCE
Status: COMPLETED | OUTPATIENT
Start: 2025-05-25 | End: 2025-05-25

## 2025-05-25 RX ORDER — VALSARTAN 160 MG/1
320 TABLET ORAL DAILY
Status: DISCONTINUED | OUTPATIENT
Start: 2025-05-25 | End: 2025-05-25 | Stop reason: HOSPADM

## 2025-05-25 RX ORDER — LEVOFLOXACIN 750 MG/1
750 TABLET, FILM COATED ORAL EVERY OTHER DAY
Qty: 3 TABLET | Refills: 0 | Status: SHIPPED | OUTPATIENT
Start: 2025-05-27 | End: 2025-06-02

## 2025-05-25 RX ADMIN — VALSARTAN 320 MG: 160 TABLET, FILM COATED ORAL at 02:05

## 2025-05-25 RX ADMIN — GABAPENTIN 100 MG: 100 CAPSULE ORAL at 08:05

## 2025-05-25 RX ADMIN — EMPAGLIFLOZIN 10 MG: 10 TABLET, FILM COATED ORAL at 08:05

## 2025-05-25 RX ADMIN — TACROLIMUS 1 MG: 1 CAPSULE ORAL at 08:05

## 2025-05-25 RX ADMIN — HYDRALAZINE HYDROCHLORIDE 100 MG: 25 TABLET ORAL at 05:05

## 2025-05-25 RX ADMIN — POTASSIUM CHLORIDE 30 MEQ: 750 CAPSULE, EXTENDED RELEASE ORAL at 08:05

## 2025-05-25 RX ADMIN — PREDNISONE 5 MG: 5 TABLET ORAL at 08:05

## 2025-05-25 RX ADMIN — GABAPENTIN 100 MG: 100 CAPSULE ORAL at 02:05

## 2025-05-25 RX ADMIN — DOXAZOSIN 8 MG: 2 TABLET ORAL at 08:05

## 2025-05-25 RX ADMIN — HYDRALAZINE HYDROCHLORIDE 100 MG: 25 TABLET ORAL at 02:05

## 2025-05-25 RX ADMIN — ATORVASTATIN CALCIUM 80 MG: 40 TABLET, FILM COATED ORAL at 08:05

## 2025-05-25 NOTE — DISCHARGE SUMMARY
Arvind Jay - Internal Medicine [facility] Medicine  Discharge Summary      Patient Name: Ravi Zamorano  MRN: [id number]  MARISOL: [id number]  Patient Class: IP- Inpatient  Admission Date: 5/22/2025  Hospital Length of Stay: 3 days  Discharge Date and Time: 05/25/2025 1:38 PM  Attending Physician: Izabella Cavanaugh MD   Discharging Provider: Sarahi Cano MD  Primary Care Provider: Mima Mack MD  [facility] Medicine Team: [facility] 2 Sarahi Cano MD  Primary Care Team: [facility] 2    HPI:   Ravi Zamorano is a 68yo male with a hx of CKD s/p kidney transplant (2016) on tacrolimus, afib (on xarelto), HTN, HLD, CHF, CVA who presented for evaluation of cough, fever, chills, mild hemoptysis, and lower back pain starting about 4am this morning. He ran 101F fever last night. Low back pain resolved with tylenol. His hemoptysis occurred 4 times this morning with about 1/2 teaspoon of blood each time. He denies any headache, vision changes, chest pain, abdominal pain, N/V/D, or dysuria. He has leg swelling at his baseline.     He was recently admitted 4/29-5/10 for pneumonia. Completed 7 day cefipime course on 5/5. At the time there was concern for aspiration pneumonia given his large hiatal hernia. FEES was performed but barium swallow was deferred.     In the ED VSS, afebrile. CXR notable for interval development of airspace consolidation of SUAD and resolution of RLL consolidation. Labs notable for HS troponin 89 (stable), BNP 1534 (stable), procal 1.82, UA noninfectious, Na 132, BUN 55, Cr 3.0, hgb 9.0 (stable), no leukocytosis.     * No surgery found *      Hospital Course:   Respiratory status stable. CT chest with left upper lobe consolidation and resolution of right lower lobe consolidation. Transplant ID consulted who recommend pulmonology evaluation with bronchoscopy. Pulmonology will follow up outpatient. Transitioned from cefepime to levofloxacin. KTM consulted, determined current cr to  likely be PHUONG on CKD. Echo obtained to assess fluid status. CVP 15. EF 50% with in determinant diastolic function. Pt states he takes home lasix 80mg PO daily. Diuresis given. At consultation with nephrology, pt appears to be at new renal baseline. Currently asymptomatic. He is stable for discharge home with close outpatient follow up with nephrology, pulmonology, and primary care.      Goals of Care Treatment Preferences:  Code Status: Full Code      SDOH Screening:  The patient was screened for utility difficulties, food insecurity, transport difficulties, housing insecurity, and interpersonal safety and there were no concerns identified this admission.     Consults:   Consults (From admission, onward)          Status Ordering Provider     Inpatient consult to Pulmonology  Once        Provider:  (Not yet assigned)    Completed MADYSON MENENDEZ     Inpatient consult to Infectious Diseases  Once        Provider:  (Not yet assigned)    Completed MADYSON MENENDEZ     Inpatient consult to Endocrinology  Once        Provider:  (Not yet assigned)    Completed LIBERTAD BOWENS     Inpatient consult to Kidney/Pancreas Transplant Medicine  Once        Provider:  (Not yet assigned)    Completed LIBERTAD BOWENS          Physical Exam  Constitutional:       General: He is not in acute distress.     Appearance: Normal appearance. He is not ill-appearing.   HENT:      Head: Normocephalic and atraumatic.      Nose: No congestion.   Eyes:      Extraocular Movements: Extraocular movements intact.      Conjunctiva/sclera: Conjunctivae normal.   Cardiovascular:      Rate and Rhythm: Normal rate. Rhythm irregular.      Pulses: Normal pulses.      Heart sounds: Normal heart sounds.   Pulmonary:      Effort: Pulmonary effort is normal. No respiratory distress.      Breath sounds: No rhonchi (resolved).   Abdominal:      General: There is no distension.      Palpations: Abdomen is soft.      Tenderness: There is no abdominal tenderness.    Musculoskeletal:      Right lower leg: No Edema present.      Left lower leg: No Edema present.   Skin:     General: Skin is warm.   Neurological:      General: No focal deficit present.      Mental Status: He is alert and oriented to person, place, and time.   Psychiatric:         Mood and Affect: Mood normal.         Thought Content: Thought content normal.   Assessment & Plan  Left upper lobe consolidation  Hemoptysis  Has hx of recurrent pneumonia with multiple hospitalizations, most recently discharged 5/10/25 for RLL pneumonia. Presented to [facility] ED with cough, subjective fevers/chills, hemoptysis, and new SUAD consolidation seen on Xray. Initial labs with borderline leukocytosis (12.6k), Na 132. No recent respiratory cultures. Negative resp infection panels in March and early May 2025. Legionella antigen negative 5/2/25.     SLP contacted this admission who state no need for barium swallow but if we're concerned for aspiration to contact GI. Will defer for now. CT chest 5/23 with L upper lobe consolidation and resolution of RLL consolidation. Transplant ID consulted who recommend bronch with pulm.       Plan:  -- TB r/o given immunosuppressed and recurrent pna although very low suspicion   - reported history of latent Tb previously treated  -- started on vanc/cefepime given recent hospitalization  -- transitioned to levofloxacin per ID recs  -- RIP, blood cx, and resp cx all negative   -- pulmonology signed off, recommended outpatient follow up in 4-6 weeks    Type 2 diabetes mellitus with stage 4 chronic kidney disease, with long-term current use of insulin  Insulin pump in place  Creatine stable for now. BMP reviewed- noted Estimated Creatinine Clearance: 27 mL/min (A) (based on SCr of 3 mg/dL (H)). according to latest data. Based on current GFR, CKD stage is stage 4 - GFR 15-29.  Monitor UOP and serial BMP and adjust therapy as needed. Renally dose meds. Avoid nephrotoxic medications and  procedures.    Plan:  -- has home insulin pump  -- Consult endocrine  -- POC glucose ACHS      Hyperlipidemia  Continue home lipitor 8omg    Chronic combined systolic (congestive) and diastolic (congestive) heart failure  Elevated brain natriuretic peptide (BNP) level  Patient has Combined Systolic and Diastolic heart failure that is Chronic. On presentation their CHF was well compensated. Most recent BNP and echo results are listed below.      Echo  Result Date: 5/23/2025    Left Ventricle: The left ventricle is normal in size. Moderately   increased ventricular mass. Increased wall thickness. Mild septal   thickening. There is moderate concentric hypertrophy. Mild global   hypokinesis and regional wall motion abnormalities present. See diagram   for wall motion findings. There is mildly reduced systolic function with a   visually estimated ejection fraction of 45 - 50%. Ejection fraction is   approximately 48%. Quantitated ejection fraction is 45%. There is   indeterminate diastolic function.    Right Ventricle: The right ventricle is normal in size Wall thickness   is normal. Systolic function is normal.    Left Atrium: The left atrium is severely dilated    Aortic Valve: There is mild aortic valve sclerosis. There is mild to   mild-moderate aortic regurgitation.    Mitral Valve: There is mild regurgitation.    Tricuspid Valve: There is mild regurgitation.    Pulmonary Artery: There is mild pulmonary hypertension. The estimated   pulmonary artery systolic pressure is 46 mmHg.    IVC/SVC: Elevated venous pressure at 15 mmHg.        Current Heart Failure Medications  hydrALAZINE tablet 100 mg, Every 8 hours, Oral  empagliflozin (Jardiance) tablet 10 mg, Daily, Oral  valsartan tablet 320 mg, Daily, Oral    BNP 1500, similar to prior admission    Plan  - Monitor strict I&Os and daily weights.    - Place on telemetry  - Low sodium diet  - Place on fluid restriction of 1.5 L.   - Will trial IV lasix 60mg and monitor  UOP   - CVP 15 on admission echo   - continuing diuresis as tolerated  S/P cadaveric kidney transplant 11/5/2016. ESRD secondary to HTN/DMII  Immunocompromised state due to drug therapy  Prophylactic immunotherapy  PHUONG (acute kidney injury)  KTM consulted. Unclear baseline. Cr 2.5 3 weeks ago, now 3.0    - avoid nephrotoxic agents  - follow up ktm recs  - close outpatient follow up on discharge    Persistent atrial fibrillation  Anticoagulant long-term use  Patient has permanent atrial fibrillation. Patient is currently in atrial fibrillation. [id number] Score: 3. The patients heart rate in the last 24 hours is as follows:  Pulse  Min: 61  Max: 67     Antiarrhythmics       Anticoagulants  rivaroxaban tablet 15 mg, With dinner, Oral    Plan  - Replete lytes with a goal of K>4, Mg >2  - Patient is anticoagulated, see medications listed above.  - Patient's afib is currently controlled    BPH with urinary obstruction  Continue doxazosin    Elevated troponin  Chronically elevated. At baseline    PAD (peripheral artery disease)  Continue home lipitor    Anemia of chronic disease  Anemia is likely due to nutritional deficiency. Most recent hemoglobin and hematocrit are listed below.  Recent Labs     05/23/25  0308 05/24/25  0632 05/25/25  0444   HGB 8.7* 8.4* 8.8*   HCT 28.7* 27.6* 29.0*     Plan  - Monitor serial CBC: Daily  - Transfuse PRBC if patient becomes hemodynamically unstable, symptomatic or H/H drops below 7/21.  - Patient has not received any PRBC transfusions to date  - Patient's anemia is currently stable    Embolic stroke involving right middle cerebral artery  Historic. No residual deficits. Remains on rivaroxaban and lipitor.    Hyponatremia  Hyponatremia is likely due to Heart Failure and/or HCTZ use. The patient's most recent sodium results are listed below.  Recent Labs     05/23/25  0309 05/24/25  0632 05/25/25  0444   * 134* 135*     Plan  - monitor with daily cmp  - improved with holding HCTZ,  will hold on discharge      Final Active Diagnoses:    Diagnosis Date Noted POA    PRINCIPAL PROBLEM:  Left upper lobe consolidation [J18.1] 04/29/2025 Yes    Elevated brain natriuretic peptide (BNP) level [R79.89] 05/22/2025 Yes    Insulin pump in place [Z96.41] 05/06/2025 Not Applicable    Hemoptysis [R04.2] 04/30/2025 Yes    Hyponatremia [E87.1] 02/20/2025 Yes    PHUONG (acute kidney injury) [N17.9] 02/20/2025 Yes    Embolic stroke involving right middle cerebral artery [I63.411] 12/07/2024 Yes    Anemia of chronic disease [D63.8] 06/26/2024 Yes    PAD (peripheral artery disease) [I73.9] 02/09/2023 Yes    Elevated troponin [R79.89] 07/10/2022 Yes    BPH with urinary obstruction [N40.1, N13.8] 11/25/2020 Yes    Anticoagulant long-term use [Z79.01] 10/03/2019 Not Applicable    Persistent atrial fibrillation [I48.19] 08/09/2019 Yes    Immunocompromised state due to drug therapy [D84.821, Z79.899] 11/07/2016 Not Applicable    S/P cadaveric kidney transplant 11/5/2016. ESRD secondary to HTN/DMII [Z94.0] 11/05/2016 Not Applicable    Chronic combined systolic (congestive) and diastolic (congestive) heart failure [I50.42] 10/24/2014 Yes    Prophylactic immunotherapy [Z29.89] 09/16/2014 Not Applicable    Hyperlipidemia [E78.5] 07/29/2014 Yes    Type 2 diabetes mellitus with stage 4 chronic kidney disease, with long-term current use of insulin [E11.22, N18.4, Z79.4] 07/29/2014 Not Applicable      Problems Resolved During this Admission:       Discharged Condition: good    Disposition: Home or Self Care    Follow Up:   Follow-up Information       ProviderKenneth .    Specialty: Internal Medicine  Contact information:  [address] [facility] [address]  [facility] [address]               [facility] NEPHROLOGY. Schedule an appointment as soon as possible for a visit in 1 week(s).    Specialty: Nephrology  Contact information:  [address]  [facility] [id number] [phone]             Arvind Jay [facility]  Denise. Schedule an appointment as soon as possible for a visit.    Specialty: Internal Medicine  Contact information:  [address]  [facility] [address] [phone]  Additional information:  [facility]   Please park in surface lot and check in at central registration desk             [facility] PULMONARY MEDICINE. Schedule an appointment as soon as possible for a visit.    Specialty: Pulmonology  Contact information:  [address]  [facility] [id number] [phone]                         Patient Instructions:      Ambulatory referral/consult to [facility] Acute Care At Home   Standing Status: Future   Referral Priority: Routine Referral Type: Consultation   Referred to Provider: RPABHU PROVIDER Requested Specialty: Internal Medicine   Number of Visits Requested: 1     Ambulatory referral/consult to Pulmonology   Standing Status: Future   Referral Priority: Urgent Referral Type: Consultation   Referral Reason: Specialty Services Required   Requested Specialty: Pulmonary Disease   Number of Visits Requested: 1     Ambulatory referral/consult to Nephrology   Standing Status: Future   Referral Priority: Urgent Referral Type: Consultation   Referral Reason: Specialty Services Required   Requested Specialty: Nephrology   Number of Visits Requested: 1     Ambulatory referral/consult to Internal Medicine   Standing Status: Future   Referral Priority: Urgent Referral Type: Consultation   Referral Reason: Specialty Services Required   Requested Specialty: Internal Medicine   Number of Visits Requested: 1     Diet renal     Notify your health care provider if you experience any of the following:  temperature >100.4     Notify your health care provider if you experience any of the following:  persistent nausea and vomiting or diarrhea     Notify your health care provider if you experience any of the following:  severe uncontrolled pain     Notify your health care  provider if you experience any of the following:  redness, tenderness, or signs of infection (pain, swelling, redness, odor or green/yellow discharge around incision site)     Notify your health care provider if you experience any of the following:  difficulty breathing or increased cough     Notify your health care provider if you experience any of the following:  severe persistent headache     Notify your health care provider if you experience any of the following:  worsening rash     Notify your health care provider if you experience any of the following:  persistent dizziness, light-headedness, or visual disturbances     Notify your health care provider if you experience any of the following:  increased confusion or weakness     Activity as tolerated       Significant Diagnostic Studies: Labs: CMP   Recent Labs   Lab 05/24/25  0632 05/25/25  0444   * 135*   K 3.9 3.7    102   CO2 22* 22*    123*   BUN 63* 63*   CREATININE 3.0* 3.0*   CALCIUM 8.7 8.8   PROT 5.9* 6.1   ALBUMIN 2.6* 2.6*   BILITOT 0.7 0.6   ALKPHOS 53 55   AST 12 12   ALT <5* 5*   ANIONGAP 10 11    and CBC   Recent Labs   Lab 05/24/25  0632 05/25/25  0444   WBC 6.25 5.31   HGB 8.4* 8.8*   HCT 27.6* 29.0*   * 130*     CT chest: New left upper lobe perihilar airspace disease when compared to 05/01/2025.  A similar finding in the right lung on the a previous exam is substantially improved.  The appearance is most consistent with multifocal pneumonia.     Bilateral pleural effusions right greater than left, improved when compared to 05/01/2025.     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    Pending Diagnostic Studies:       Procedure Component Value Units Date/Time    Legionella antigen, urine [[id number]] Collected: 05/23/25 1646    Order Status: Sent Lab Status: In process Updated: 05/23/25 1716    Specimen: Urine, Clean Catch           "  Medications:  Reconciled Home Medications:      Medication List        PAUSE taking these medications      eplerenone 50 MG Tab  Wait to take this until your doctor or other care provider tells you to start again.  Commonly known as: INSPRA  Take 1 tablet (50 mg total) by mouth once daily.     hydroCHLOROthiazide 25 MG tablet  Wait to take this until your doctor or other care provider tells you to start again.  Commonly known as: HYDRODIURIL  Take 1 tablet (25 mg total) by mouth once daily.     mycophenolate 250 mg Cap  Wait to take this until your doctor or other care provider tells you to start again.  Commonly known as: CELLCEPT  Take 2 capsules (500 mg total) by mouth 2 (two) times daily.            START taking these medications      levoFLOXacin 750 MG tablet  Commonly known as: LEVAQUIN  Take 1 tablet (750 mg total) by mouth every other day. for 6 days  Start taking on: May 27, 2025            CONTINUE taking these medications      atorvastatin 80 MG tablet  Commonly known as: LIPITOR  Take 1 tablet (80 mg total) by mouth once daily.     BD ULTRA-FINE LO PEN NEEDLE 32 gauge x 5/32" Ndle  Generic drug: pen needle, diabetic  USE TO ADMINISTER INSULIN 4 TIMES DAILY.     cloNIDine 0.1 mg/24 hr td ptwk 0.1 mg/24 hr  Commonly known as: CATAPRES  Place 1 patch onto the skin every 7 days.     DEXCOM G7 SENSOR Kayla  Generic drug: blood-glucose sensor  Change sensor every 10 days.     doxazosin 8 MG Tab  Commonly known as: CARDURA  Take 1 tablet (8 mg total) by mouth once daily.     gabapentin 300 MG capsule  Commonly known as: NEURONTIN  Take 1 capsule (300 mg total) by mouth 2 (two) times daily.     hydrALAZINE 100 MG tablet  Commonly known as: APRESOLINE  Take 1 tablet (100 mg total) by mouth every 8 (eight) hours.     JARDIANCE 10 mg tablet  Generic drug: empagliflozin  Take 1 tablet (10 mg total) by mouth once daily.     magnesium oxide 400 mg (241.3 mg magnesium) tablet  Commonly known as: MAG-OX  Take 1 " tablet (400 mg total) by mouth 2 (two) times daily.     meclizine 25 mg tablet  Commonly known as: ANTIVERT  Take 1 tablet (25 mg total) by mouth 3 (three) times daily as needed for Dizziness.     MIRALAX 17 gram Pwpk  Generic drug: polyethylene glycol  Take 17 gm (mixed in liquid) by mouth every day.     NovoLOG U-100 Insulin aspart 100 unit/mL injection  Generic drug: insulin aspart U-100  Use in pump, max daily 100 units.     OMNIPOD 5 G6-G7 PODS (GEN 5) Crtg  Generic drug: insulin pump cart,auto,BT,G6/7  Change every 48 hours.     PEPCID AC ORAL  Take 1 tablet by mouth daily as needed (Acid reflux).     predniSONE 5 MG tablet  Commonly known as: DELTASONE  Take 1 tablet (5 mg total) by mouth once daily.     tacrolimus 1 MG Cap  Commonly known as: PROGRAF  Take 1 capsule (1 mg total) by mouth every 12 (twelve) hours.     TRUE METRIX GLUCOSE METER Misc  Generic drug: blood-glucose meter  Use to check blood glucose 1 times daily, to use with insurance preferred meter     TRUE METRIX GLUCOSE TEST STRIP Strp  Generic drug: blood sugar diagnostic  Use to check blood glucose 1 times daily, to use with insurance preferred meter     TRUEPLUS LANCETS 30 gauge Misc  Generic drug: lancets  Use to check blood glucose 1 times daily, to use with insurance preferred meter     valsartan 320 MG tablet  Commonly known as: DIOVAN  Take 1 tablet (320 mg total) by mouth once daily.     VITAMIN D2 1,250 mcg (50,000 unit) capsule  Generic drug: ergocalciferol  Take 1 capsule (50,000 Units total) by mouth every 7 days.     XARELTO 15 mg Tab  Generic drug: rivaroxaban  Take 1 tablet (15 mg total) by mouth daily with dinner or evening meal.              Indwelling Lines/Drains at time of discharge:   Lines/Drains/Airways       None                   Time spent on the discharge of patient: 40 minutes         Sarahi Cano MD  Department of Hospital Medicine  [facility] - Internal Medicine Telemetry

## 2025-05-25 NOTE — NURSING
Pt BP readings at 0515 were 205/92 and 195/86 when rechecked. Pt had just been given his morning dose of 100mg hydralyzine. Pt relaxing in bed, reports no pain, headache, blurry vision, or any other abnormal symptoms. Dr Marcus Evasn notified, no new orders received. Will continue to monitor.

## 2025-05-25 NOTE — PLAN OF CARE
Jossie Jay - Internal Medicine Telemetry  Discharge Final Note    Primary Care Provider: Mima Mack MD    Expected Discharge Date: 5/25/2025    Patient cleared for discharge from case management standpoint.    Final Discharge Note (most recent)       Final Note - 05/25/25 1346          Final Note    Assessment Type Final Discharge Note     Anticipated Discharge Disposition Home or Self Care     What phone number can be called within the next 1-3 days to see how you are doing after discharge? [phone]     Hospital Resources/Appts/Education Provided Provided patient/caregiver with written discharge plan information        Post-Acute Status    Other Status No Post-Acute Service Needs     Discharge Delays None known at this time                   Contact Info       Kenneth Provider   Specialty: Internal Medicine    [address] [facility]  [address]  [facility] [place] [address]       Next Steps: Follow up    [facility] NEPHROLOGY   Specialty: Nephrology    [address] [facility] [address]   Phone: [phone]       Next Steps: Schedule an appointment as soon as possible for a visit in 1 week(s)    Jossie Jay [facility]   Specialty: Internal Medicine    [address]  [place] [address]   Phone: [phone]       Next Steps: Schedule an appointment as soon as possible for a visit    [facility] PULMONARY MEDICINE   Specialty: Pulmonology    [address] [facility] [address]   Phone: [phone]       Next Steps: Schedule an appointment as soon as possible for a visit          Future Appointments   Date Time Provider Department Center   6/3/2025 10:00 AM LAB, [facility] LAB [facility]   6/6/2025 10:00 AM EPO INJECTION SCHEDULE [facility] NEPHRO [facility]   6/12/2025  9:00 AM 3PREP, JOSSIE [facility] S[facility]U [facility]   6/12/2025 11:00 AM INPATIENT, FELICIANO [facility] ECHOSTR [facility]   6/16/2025 10:30 AM Nayely Vital PA-C [facility] CARDIO [facility]   6/17/2025 10:00 AM Argelia Bridges RD [facility]  JULITA CONKLIN   9/30/2025  9:30 AM [facility] LAB [facility]   10/7/2025 11:00 AM Karan Lopez APRN, FNP [facility] Arvind Costello RN  Weekend  - [facility] [place]  [phone]

## 2025-05-25 NOTE — NURSING
Patient discharged today. Awake, alert, and oriented with no acute distress noted. Spouse at bedside. Virtual nurse reviewed discharge orders including: medicine orders, prescriptions, follow-up appointments, and patient education materials for diet and diagnosis. Belongings packed for transport to home. Ambulating out per wheelchair at time of discharge accompanied by spouse.

## 2025-05-25 NOTE — SUBJECTIVE & OBJECTIVE
"Interval HPI:   Overnight events: No acute events overnight. Patient in room 1110/1110 A. Blood glucose stable. BG at goal on current insulin regimen (Home Insulin Pump). Steroid use- Prednisone 5 mg QD.    Renal function- Abnormal -    Vasopressors-  None       Endocrine will continue to follow and manage insulin orders inpatient.         Diet Low Sodium (2 gm) Fluid - 1500mL     Eatin%  Nausea: No  Hypoglycemia and intervention: No  Fever: No  TPN and/or TF: No  If yes, type of TF/TPN and rate: n/a    BP (!) 193/86   Pulse 63   Temp 98 °F (36.7 °C) (Oral)   Resp 16   Ht 6' 1" (1.854 m)   Wt 90.7 kg (200 lb)   SpO2 96%   BMI 26.39 kg/m²     Labs Reviewed and Include    Recent Labs   Lab 25  0444   *   CALCIUM 8.8   ALBUMIN 2.6*   PROT 6.1   *   K 3.7   CO2 22*      BUN 63*   CREATININE 3.0*   ALKPHOS 55   ALT 5*   AST 12   BILITOT 0.6     Lab Results   Component Value Date    WBC 5.31 2025    HGB 8.8 (L) 2025    HCT 29.0 (L) 2025    MCV 75 (L) 2025     (L) 2025     No results for input(s): "TSH", "FREET4" in the last 168 hours.  Lab Results   Component Value Date    HGBA1C 8.3 (H) 2025       Nutritional status:   Body mass index is 26.39 kg/m².  Lab Results   Component Value Date    ALBUMIN 2.6 (L) 2025    ALBUMIN 2.6 (L) 2025    ALBUMIN 2.6 (L) 2025     No results found for: "PREALBUMIN"    Estimated Creatinine Clearance: 27 mL/min (A) (based on SCr of 3 mg/dL (H)).    Accu-Checks  Recent Labs     25  1942 25  0739 25  1149 25  1533 25  2043 25  0736 25  1139 25  1708 25  2133 25  0730   POCTGLUCOSE 254* 131* 200* 180* 170* 115* 133* 186* 162* 131*       Current Medications and/or Treatments Impacting Glycemic Control  Immunotherapy:    Immunosuppressants           Stop Route Frequency     tacrolimus capsule 1 mg         -- Oral 2 times daily      "     Steroids:   Hormones (From admission, onward)      Start     Stop Route Frequency Ordered    05/23/25 0900  predniSONE tablet 5 mg         -- Oral Daily 05/22/25 1849          Pressors:    Autonomic Drugs (From admission, onward)      None          Hyperglycemia/Diabetes Medications:   Antihyperglycemics (From admission, onward)      Start     Stop Route Frequency Ordered    05/22/25 1730  insulin aspart U-100 insulin pump from home 0.6 Units        Question Answer Comment   Target number 120    Basal Rate #1 0.6    Basal rate #1 time 5275-5892        -- SubQ Continuous 05/22/25 1508    05/22/25 1730  insulin aspart U-100 insulin pump from home 0-20 Units        Question Answer Comment   Target number 120    Carbohydrate coverage #1 1:7.5    Carbohydrate coverage #1 time 8454-5350    Sensitivity #1 1:30    Sensitivity #1 time 3127-3308        -- SubQ As needed (PRN) 05/22/25 1508    05/22/25 1530  empagliflozin (Jardiance) tablet 10 mg        Question Answer Comment   Does this patient have a diagnosis of heart failure? Yes    Does this patient have type 1 diabetes or diabetic ketoacidosis? No    Does this patient have symptomatic hypotension? No    Is the patient NPO or pending major surgery in next 3 days or less? No        -- Oral Daily 05/22/25 7966

## 2025-05-25 NOTE — NURSING
AVS virtually reviewed with patient and wife in its entirety with emphasis on diet, medications, follow-up appointments and reasons to return to the ED. Patient also encouraged to utilize their patient portal. Ease and convenience of use reiterated. Education complete and patient voiced understanding. All questions answered. Discharge teaching complete.

## 2025-05-25 NOTE — ASSESSMENT & PLAN NOTE
Patient has Combined Systolic and Diastolic heart failure that is Chronic. On presentation their CHF was well compensated. Most recent BNP and echo results are listed below.      Echo  Result Date: 5/23/2025    Left Ventricle: The left ventricle is normal in size. Moderately   increased ventricular mass. Increased wall thickness. Mild septal   thickening. There is moderate concentric hypertrophy. Mild global   hypokinesis and regional wall motion abnormalities present. See diagram   for wall motion findings. There is mildly reduced systolic function with a   visually estimated ejection fraction of 45 - 50%. Ejection fraction is   approximately 48%. Quantitated ejection fraction is 45%. There is   indeterminate diastolic function.    Right Ventricle: The right ventricle is normal in size Wall thickness   is normal. Systolic function is normal.    Left Atrium: The left atrium is severely dilated    Aortic Valve: There is mild aortic valve sclerosis. There is mild to   mild-moderate aortic regurgitation.    Mitral Valve: There is mild regurgitation.    Tricuspid Valve: There is mild regurgitation.    Pulmonary Artery: There is mild pulmonary hypertension. The estimated   pulmonary artery systolic pressure is 46 mmHg.    IVC/SVC: Elevated venous pressure at 15 mmHg.        Current Heart Failure Medications  hydrALAZINE tablet 100 mg, Every 8 hours, Oral  empagliflozin (Jardiance) tablet 10 mg, Daily, Oral  valsartan tablet 320 mg, Daily, Oral    BNP 1500, similar to prior admission    Plan  - Monitor strict I&Os and daily weights.    - Place on telemetry  - Low sodium diet  - Place on fluid restriction of 1.5 L.   - Will trial IV lasix 60mg and monitor UOP   - CVP 15 on admission echo   - continuing diuresis as tolerated

## 2025-05-25 NOTE — ASSESSMENT & PLAN NOTE
Hyponatremia is likely due to Heart Failure and/or HCTZ use. The patient's most recent sodium results are listed below.  Recent Labs     05/23/25  0309 05/24/25  0632 05/25/25  0444   * 134* 135*     Plan  - monitor with daily cmp  - improved with holding HCTZ, will hold on discharge

## 2025-05-25 NOTE — ASSESSMENT & PLAN NOTE
Patient has permanent atrial fibrillation. Patient is currently in atrial fibrillation. [id number] Score: 3. The patients heart rate in the last 24 hours is as follows:  Pulse  Min: 61  Max: 67     Antiarrhythmics       Anticoagulants  rivaroxaban tablet 15 mg, With dinner, Oral    Plan  - Replete lytes with a goal of K>4, Mg >2  - Patient is anticoagulated, see medications listed above.  - Patient's afib is currently controlled

## 2025-05-25 NOTE — PROGRESS NOTES
[facility] Tele[facility]  Endocrinology  Progress Note    Admit Date: 2025     Reason for Consult: Management of T2DM, Hyperglycemia      Diabetes diagnosis year:       Home Diabetes Medications:    OMNIPOD 5  0.6 u/hr mn-0600 , 9p-mn, 0.7 u/hr 6a-9p   Target 120-130 mg/dl   Iob 3 hours  Max bolus 20 units   Max basal 2 u/hr   Isf 40 mn-mn  ICR 1 to 7.5      Presets:  Snack: 4 units (28g)  Small Meal: 8 units (56g)  Medium Meal: 12 units (90g)  Large Meal: 15 units (114g)  Super Large Meal: 18 units (130g)     How often checking glucose at home? >4 x day   BG readings on regimen: 150s  Hypoglycemia on the regimen?  No  Missed doses on regimen?  No     Diabetes Complications include:     Hyperglycemia     Complicating diabetes co morbidities:   S/p Kidney tx          HPI: his is a 69-year-old male with a history of DM, renal transplant , CHFpEF, AF/AFL (PVI/PWI/CTI 2024), CVA, recurrent PNA who presents with fevers, chills. States that symptoms are similar to last month when he was admitted for pneumonia. Was in his usual state of health until last night when he started experiencing intermittent fevers, chills. Received IV vancomycin, azithromycin, Zosyn.  Septic workup obtained. Endocrine conulsted to manage hyperglycemia, type 2 diabetes, and insulin pump therapy.    Lab Results   Component Value Date    HGBA1C 8.3 (H) 2025         Interval HPI:   Overnight events: No acute events overnight. Patient in room 1110/1110 A. Blood glucose stable. BG at goal on current insulin regimen (Home Insulin Pump). Steroid use- Prednisone 5 mg QD.    Renal function- Abnormal -    Vasopressors-  None       Endocrine will continue to follow and manage insulin orders inpatient.         Diet Low Sodium (2 gm) Fluid - 1500mL     Eatin%  Nausea: No  Hypoglycemia and intervention: No  Fever: No  TPN and/or TF: No  If yes, type of TF/TPN and rate: n/a    BP (!) 193/86   Pulse 63   Temp 98  "°F (36.7 °C) (Oral)   Resp 16   Ht 6' 1" (1.854 m)   Wt 90.7 kg (200 lb)   SpO2 96%   BMI 26.39 kg/m²     Labs Reviewed and Include    Recent Labs   Lab 05/25/25  0444   *   CALCIUM 8.8   ALBUMIN 2.6*   PROT 6.1   *   K 3.7   CO2 22*      BUN 63*   CREATININE 3.0*   ALKPHOS 55   ALT 5*   AST 12   BILITOT 0.6     Lab Results   Component Value Date    WBC 5.31 05/25/2025    HGB 8.8 (L) 05/25/2025    HCT 29.0 (L) 05/25/2025    MCV 75 (L) 05/25/2025     (L) 05/25/2025     No results for input(s): "TSH", "FREET4" in the last 168 hours.  Lab Results   Component Value Date    HGBA1C 8.3 (H) 03/27/2025       Nutritional status:   Body mass index is 26.39 kg/m².  Lab Results   Component Value Date    ALBUMIN 2.6 (L) 05/25/2025    ALBUMIN 2.6 (L) 05/24/2025    ALBUMIN 2.6 (L) 05/23/2025     No results found for: "PREALBUMIN"    Estimated Creatinine Clearance: 27 mL/min (A) (based on SCr of 3 mg/dL (H)).    Accu-Checks  Recent Labs     05/22/25  1942 05/23/25  0739 05/23/25  1149 05/23/25  1533 05/23/25  2043 05/24/25  0736 05/24/25  1139 05/24/25  1708 05/24/25  2133 05/25/25  0730   POCTGLUCOSE 254* 131* 200* 180* 170* 115* 133* 186* 162* 131*       Current Medications and/or Treatments Impacting Glycemic Control  Immunotherapy:    Immunosuppressants           Stop Route Frequency     tacrolimus capsule 1 mg         -- Oral 2 times daily          Steroids:   Hormones (From admission, onward)      Start     Stop Route Frequency Ordered    05/23/25 0900  predniSONE tablet 5 mg         -- Oral Daily 05/22/25 1849          Pressors:    Autonomic Drugs (From admission, onward)      None          Hyperglycemia/Diabetes Medications:   Antihyperglycemics (From admission, onward)      Start     Stop Route Frequency Ordered    05/22/25 1730  insulin aspart U-100 insulin pump from home 0.6 Units        Question Answer Comment   Target number 120    Basal Rate #1 0.6    Basal rate #1 time 5843-1621        " -- SubQ Continuous 05/22/25 1508    05/22/25 1730  insulin aspart U-100 insulin pump from home 0-20 Units        Question Answer Comment   Target number 120    Carbohydrate coverage #1 1:7.5    Carbohydrate coverage #1 time 5976-0106    Sensitivity #1 1:30    Sensitivity #1 time 3408-0899        -- SubQ As needed (PRN) 05/22/25 1508    05/22/25 1530  empagliflozin (Jardiance) tablet 10 mg        Question Answer Comment   Does this patient have a diagnosis of heart failure? Yes    Does this patient have type 1 diabetes or diabetic ketoacidosis? No    Does this patient have symptomatic hypotension? No    Is the patient NPO or pending major surgery in next 3 days or less? No        -- Oral Daily 05/22/25 1425            ASSESSMENT and PLAN    Pulmonary  * Left upper lobe consolidation  Infection may elevate BG readings  On IV antibiotics  Managed per primary team  Avoid hypoglycemia        Immunology/Multi System  Prophylactic immunotherapy  On immunosuppressive therapy per transplant team; may elevate BG readings        Endocrine  Type 2 diabetes mellitus with stage 4 chronic kidney disease, with long-term current use of insulin  BG goal 140-180  T2DM.  CKD. Omnipod 5.  Recurrent pneumonia.    - Home Insulin Pump  - BG checks /HS/0200  - Hypoglycemia protocol in place  - If blood glucose greater than 300, please ask patient not to eat food or drink anything other than water until correctional insulin has brought it back below 250    ** Please notify Endocrine for any change and/or advance in diet**  ** Please call Endocrine for any BG related issues **    Discharge Planning:   TBD. Please notify endocrinology prior to discharge.          Leticia Medina NP  Endocrinology  [facility] Tele[facility]

## 2025-05-25 NOTE — SUBJECTIVE & OBJECTIVE
Interval History: NAEO. Good UOP but inaccurate intake recorded. Cr stable. BP elevated, asymptomatic.     Review of Systems  Objective:     Vital Signs (Most Recent):  Temp: 98 °F (36.7 °C) (05/25/25 0732)  Pulse: 63 (05/25/25 0732)  Resp: 16 (05/25/25 0732)  BP: (!) 193/86 (05/25/25 0732)  SpO2: 96 % (05/25/25 0732) Vital Signs (24h Range):  Temp:  [97.6 °F (36.4 °C)-98 °F (36.7 °C)] 98 °F (36.7 °C)  Pulse:  [61-67] 63  Resp:  [16-19] 16  SpO2:  [96 %-97 %] 96 %  BP: (152-205)/(54-92) 193/86     Weight: 90.7 kg (200 lb)  Body mass index is 26.39 kg/m².    Intake/Output Summary (Last 24 hours) at 5/25/2025 0834  Last data filed at 5/25/2025 0655  Gross per 24 hour   Intake 360 ml   Output 1600 ml   Net -1240 ml         Physical Exam  Constitutional:       General: He is not in acute distress.     Appearance: Normal appearance. He is not ill-appearing.   HENT:      Head: Normocephalic and atraumatic.      Nose: No congestion.   Eyes:      Extraocular Movements: Extraocular movements intact.      Conjunctiva/sclera: Conjunctivae normal.   Cardiovascular:      Rate and Rhythm: Normal rate. Rhythm irregular.      Pulses: Normal pulses.      Heart sounds: Normal heart sounds.   Pulmonary:      Effort: Pulmonary effort is normal. No respiratory distress.      Breath sounds: No rhonchi (resolved).   Abdominal:      General: There is no distension.      Palpations: Abdomen is soft.      Tenderness: There is no abdominal tenderness.   Musculoskeletal:      Right lower leg: Edema (improved) present.      Left lower leg: Edema present.   Skin:     General: Skin is warm.   Neurological:      General: No focal deficit present.      Mental Status: He is alert and oriented to person, place, and time.   Psychiatric:         Mood and Affect: Mood normal.         Thought Content: Thought content normal.               Significant Labs: All pertinent labs within the past 24 hours have been reviewed.  CBC:   Recent Labs   Lab  05/24/25  0632 05/25/25  0444   WBC 6.25 5.31   HGB 8.4* 8.8*   HCT 27.6* 29.0*   * 130*     CMP:   Recent Labs   Lab 05/24/25  0632 05/25/25  0444   * 135*   K 3.9 3.7    102   CO2 22* 22*    123*   BUN 63* 63*   CREATININE 3.0* 3.0*   CALCIUM 8.7 8.8   PROT 5.9* 6.1   ALBUMIN 2.6* 2.6*   BILITOT 0.7 0.6   ALKPHOS 53 55   AST 12 12   ALT <5* 5*   ANIONGAP 10 11       Significant Imaging: I have reviewed all pertinent imaging results/findings within the past 24 hours.

## 2025-05-25 NOTE — ASSESSMENT & PLAN NOTE
Anemia is likely due to nutritional deficiency. Most recent hemoglobin and hematocrit are listed below.  Recent Labs     05/23/25  0308 05/24/25  0632 05/25/25  0444   HGB 8.7* 8.4* 8.8*   HCT 28.7* 27.6* 29.0*     Plan  - Monitor serial CBC: Daily  - Transfuse PRBC if patient becomes hemodynamically unstable, symptomatic or H/H drops below 7/21.  - Patient has not received any PRBC transfusions to date  - Patient's anemia is currently stable

## 2025-05-25 NOTE — ASSESSMENT & PLAN NOTE
Patient has permanent atrial fibrillation. Patient is currently in atrial fibrillation. [id number] Score: 3. The patients heart rate in the last 24 hours is as follows:  Pulse  Min: 61  Max: 67     Antiarrhythmics       Anticoagulants  rivaroxaban tablet 15 mg, With dinner, Oral    Plan  - Replete lytes with a goal of K>4, Mg >2  - Patient is anticoagulated, see medications listed above.  - Patient's afib is currently controlled

## 2025-05-25 NOTE — PLAN OF CARE
Ravi Zamorano is a 69 year old male with history DM, HFpEF, Atril Fib/Flutter, TB (treated in 2014) and kidney transplant (2016) who represented to the ED (5/22) for cough with blood streaks. Patient recently admitted for recurrent pneumonia (4/29 - 5/10)  with overall negative workup to include cultures and imaging consistent with pneumonia completed azithromycin and cefepime with improvement in symptoms and discharge. ID consulted for recurrent pneumonia. CT chest (5/22) with ground glass attenuation with interlobar thickening and scattered dense areas of consolidation of the left upper lobe, new findings from 5/1/2025.      Concern for recurrent pneumonia   - 5/22 blood cultures NGTD  - MRSA Nares PCR negative   - Quantiferon gold positive with very low concern for active TB disease as had positive result in 2014 which was documented to be treated. Patient no longer requires latent TB testing as this result will always be positive.   - Immunoglobulin panel normal   - Urine Legionella pending   - Continue Levofloxacin 750mg Daily (Qtc 425) for CAP with END DATE 5/28/2025 (5 day from 5/23)    Thank you for allowing us to participate in the care of this patient. ID will be signing off at this time. Please call back if there are changes in the patients condition, there are new cultures results, or you have any additional questions.     Chris Arcos MD  Infectious Diseases Fellow

## 2025-05-25 NOTE — ASSESSMENT & PLAN NOTE
Has hx of recurrent pneumonia with multiple hospitalizations, most recently discharged 5/10/25 for RLL pneumonia. Presented to [facility] ED with cough, subjective fevers/chills, hemoptysis, and new SUAD consolidation seen on Xray. Initial labs with borderline leukocytosis (12.6k), Na 132. No recent respiratory cultures. Negative resp infection panels in March and early May 2025. Legionella antigen negative 5/2/25.     SLP contacted this admission who state no need for barium swallow but if we're concerned for aspiration to contact GI. Will defer for now. CT chest 5/23 with L upper lobe consolidation and resolution of RLL consolidation. Transplant ID consulted who recommend bronch with pulm.       Plan:  -- TB r/o given immunosuppressed and recurrent pna although very low suspicion   - reported history of latent Tb previously treated  -- started on vanc/cefepime given recent hospitalization  -- transitioned to levofloxacin per ID recs  -- RIP, blood cx, and resp cx all negative   -- pulmonology signed off, recommended outpatient follow up in 4-6 weeks

## 2025-05-25 NOTE — PROGRESS NOTES
Kidney Transplant Medicine Service Chart Check Note:    Medications:   atorvastatin  80 mg Oral Daily    cloNIDine 0.1 mg/24 hr td ptwk  1 patch Transdermal Q7 Days    doxazosin  8 mg Oral Daily    empagliflozin  10 mg Oral Daily    ergocalciferol  50,000 Units Oral Q7 Days    gabapentin  100 mg Oral TID    And    gabapentin  200 mg Oral QHS    hydrALAZINE  100 mg Oral Q8H    levoFLOXacin  750 mg Oral Every other day    polyethylene glycol  17 g Oral Daily    predniSONE  5 mg Oral Daily    rivaroxaban  15 mg Oral Daily with dinner    tacrolimus  1 mg Oral BID    valsartan  320 mg Oral Daily       Vitals:  Temp:  [97.6 °F (36.4 °C)-98.4 °F (36.9 °C)] 98.4 °F (36.9 °C)  Pulse:  [61-67] 63  Resp:  [16-18] 18  SpO2:  [96 %-97 %] 97 %  BP: (152-205)/(54-96) 176/96    I/Os:  I/O last 3 completed shifts:  In: 720 [P.O.:720]  Out: 2500 [Urine:2500]    Labs reviewed    Hb 8.8 g/dl  Na 135 / K 3.5 / Chlor 102 / BUN 63 / Cr 3.0  Tacrolimus level 8.1    Recommendations:    Continue tacrolimus 1 mg BID  Euvolemic today, can be discontinue lasix   Continue BP medications       Everett Zamora MD  Renal Transplant Attending

## 2025-05-25 NOTE — ASSESSMENT & PLAN NOTE
Has hx of recurrent pneumonia with multiple hospitalizations, most recently discharged 5/10/25 for RLL pneumonia. Presented to [facility] ED with cough, subjective fevers/chills, hemoptysis, and new SUAD consolidation seen on Xray. Initial labs with borderline leukocytosis (12.6k), Na 132. No recent respiratory cultures. Negative resp infection panels in March and early May 2025. Legionella antigen negative 5/2/25.     SLP contacted this admission who state no need for barium swallow but if we're concerned for aspiration to contact GI. Will defer for now. CT chest 5/23 with L upper lobe consolidation and resolution of RLL consolidation. Transplant ID consulted who recommend bronch with pulm.       Plan:  -- TB r/o given immunosuppressed and recurrent pna although very low suspicion   - reported history of latent Tb previously treated  -- started on vanc/cefepime given recent hospitalization  -- transitioned to levofloxacin per ID recs  -- RIP, blood cx, and resp cx all negative   -- pulmonology signed off, recommended outpatient follow up in 4-6 weeks

## 2025-05-25 NOTE — ASSESSMENT & PLAN NOTE
KTM consulted. Unclear baseline. Cr 2.5 3 weeks ago, now 3.0    - avoid nephrotoxic agents  - follow up ktm recs  - close outpatient follow up on discharge

## 2025-05-26 ENCOUNTER — PATIENT OUTREACH (OUTPATIENT)
Dept: ADMINISTRATIVE | Facility: CLINIC | Age: 70
End: 2025-05-26
Payer: MEDICARE

## 2025-05-26 NOTE — PROGRESS NOTES
C3 nurse attempted to contact Ravi Zamorano for a TCC post hospital discharge follow up call. No answer. LVM requesting a callback at [phone].    The patient does not have a scheduled HOSFU appointment. Message sent to PCP's staff to assist with HOSFU appointment scheduling.

## 2025-05-27 LAB
BACTERIA BLD CULT: NORMAL
BACTERIA BLD CULT: NORMAL
W LEGIONELLA URINARY ANTIGEN: NEGATIVE

## 2025-05-27 NOTE — PROGRESS NOTES
3rd Attempt made to reach patient for TCC call. Left voicemail please call [phone] leave first name, last name, and  for Guille.  I will return your call.

## 2025-06-03 ENCOUNTER — LAB VISIT (OUTPATIENT)
Dept: LAB | Facility: HOSPITAL | Age: 70
End: 2025-06-03
Attending: INTERNAL MEDICINE
Payer: MEDICARE

## 2025-06-03 ENCOUNTER — HOSPITAL ENCOUNTER (INPATIENT)
Facility: HOSPITAL | Age: 70
LOS: 3 days | Discharge: HOME | DRG: 291 | End: 2025-06-07
Attending: EMERGENCY MEDICINE | Admitting: HOSPITALIST
Payer: MEDICARE

## 2025-06-03 DIAGNOSIS — N18.4 TYPE 2 DIABETES MELLITUS WITH STAGE 4 CHRONIC KIDNEY DISEASE, WITH LONG-TERM CURRENT USE OF INSULIN: ICD-10-CM

## 2025-06-03 DIAGNOSIS — Z79.899 IMMUNOCOMPROMISED STATE DUE TO DRUG THERAPY: ICD-10-CM

## 2025-06-03 DIAGNOSIS — E11.22 TYPE 2 DIABETES MELLITUS WITH STAGE 4 CHRONIC KIDNEY DISEASE, WITH LONG-TERM CURRENT USE OF INSULIN: ICD-10-CM

## 2025-06-03 DIAGNOSIS — Z29.89 PROPHYLACTIC IMMUNOTHERAPY: ICD-10-CM

## 2025-06-03 DIAGNOSIS — I50.9 ACUTE ON CHRONIC CONGESTIVE HEART FAILURE, UNSPECIFIED HEART FAILURE TYPE: Primary | ICD-10-CM

## 2025-06-03 DIAGNOSIS — I48.19 PERSISTENT ATRIAL FIBRILLATION: ICD-10-CM

## 2025-06-03 DIAGNOSIS — R00.2 PALPITATION: ICD-10-CM

## 2025-06-03 DIAGNOSIS — I50.9 ACUTE EXACERBATION OF CHF (CONGESTIVE HEART FAILURE): ICD-10-CM

## 2025-06-03 DIAGNOSIS — D84.821 IMMUNOCOMPROMISED STATE DUE TO DRUG THERAPY: ICD-10-CM

## 2025-06-03 DIAGNOSIS — N18.4 CHRONIC KIDNEY DISEASE (CKD), STAGE IV (SEVERE): ICD-10-CM

## 2025-06-03 DIAGNOSIS — I48.91 ATRIAL FIBRILLATION: ICD-10-CM

## 2025-06-03 DIAGNOSIS — Z96.41 INSULIN PUMP IN PLACE: ICD-10-CM

## 2025-06-03 DIAGNOSIS — R06.02 SHORTNESS OF BREATH: ICD-10-CM

## 2025-06-03 DIAGNOSIS — I50.43 ACUTE ON CHRONIC COMBINED SYSTOLIC AND DIASTOLIC CONGESTIVE HEART FAILURE: ICD-10-CM

## 2025-06-03 DIAGNOSIS — Z86.73 HISTORY OF CVA (CEREBROVASCULAR ACCIDENT): ICD-10-CM

## 2025-06-03 DIAGNOSIS — Z79.60 LONG-TERM USE OF IMMUNOSUPPRESSANT MEDICATION: ICD-10-CM

## 2025-06-03 DIAGNOSIS — Z94.0 STATUS POST DECEASED-DONOR KIDNEY TRANSPLANTATION: ICD-10-CM

## 2025-06-03 DIAGNOSIS — D63.8 ANEMIA OF CHRONIC DISEASE: ICD-10-CM

## 2025-06-03 DIAGNOSIS — I50.33 ACUTE ON CHRONIC DIASTOLIC CONGESTIVE HEART FAILURE: ICD-10-CM

## 2025-06-03 DIAGNOSIS — Z79.4 TYPE 2 DIABETES MELLITUS WITH STAGE 4 CHRONIC KIDNEY DISEASE, WITH LONG-TERM CURRENT USE OF INSULIN: ICD-10-CM

## 2025-06-03 DIAGNOSIS — R07.9 CHEST PAIN: ICD-10-CM

## 2025-06-03 DIAGNOSIS — N17.9 AKI (ACUTE KIDNEY INJURY): ICD-10-CM

## 2025-06-03 DIAGNOSIS — N48.6 PEYRONIE'S DISEASE: ICD-10-CM

## 2025-06-03 DIAGNOSIS — I48.3 TYPICAL ATRIAL FLUTTER: ICD-10-CM

## 2025-06-03 DIAGNOSIS — I16.0 HYPERTENSIVE URGENCY: ICD-10-CM

## 2025-06-03 PROBLEM — N18.9 ACUTE-ON-CHRONIC KIDNEY INJURY: Status: ACTIVE | Noted: 2025-06-03

## 2025-06-03 LAB
ALBUMIN SERPL BCP-MCNC: 3.3 G/DL (ref 3.5–5.2)
ALP SERPL-CCNC: 58 UNIT/L (ref 40–150)
ALT SERPL W/O P-5'-P-CCNC: <5 UNIT/L (ref 10–44)
ANION GAP (OHS): 11 MMOL/L (ref 8–16)
ANION GAP (OHS): 12 MMOL/L (ref 8–16)
APTT PPP: 35.9 SECONDS (ref 21–32)
AST SERPL-CCNC: 14 UNIT/L (ref 11–45)
BILIRUB SERPL-MCNC: 0.7 MG/DL (ref 0.1–1)
BNP SERPL-MCNC: 4062 PG/ML (ref 0–99)
BUN SERPL-MCNC: 58 MG/DL (ref 8–23)
BUN SERPL-MCNC: 58 MG/DL (ref 8–23)
CALCIUM SERPL-MCNC: 8.9 MG/DL (ref 8.7–10.5)
CALCIUM SERPL-MCNC: 8.9 MG/DL (ref 8.7–10.5)
CHLORIDE SERPL-SCNC: 105 MMOL/L (ref 95–110)
CHLORIDE SERPL-SCNC: 105 MMOL/L (ref 95–110)
CO2 SERPL-SCNC: 22 MMOL/L (ref 23–29)
CO2 SERPL-SCNC: 23 MMOL/L (ref 23–29)
CREAT SERPL-MCNC: 3.1 MG/DL (ref 0.5–1.4)
CREAT SERPL-MCNC: 3.1 MG/DL (ref 0.5–1.4)
EAG (OHS): 137 MG/DL (ref 68–131)
EAG (OHS): 137 MG/DL (ref 68–131)
ERYTHROCYTE [DISTWIDTH] IN BLOOD BY AUTOMATED COUNT: 19.6 % (ref 11.5–14.5)
GFR SERPLBLD CREATININE-BSD FMLA CKD-EPI: 21 ML/MIN/1.73/M2
GFR SERPLBLD CREATININE-BSD FMLA CKD-EPI: 21 ML/MIN/1.73/M2
GLUCOSE SERPL-MCNC: 107 MG/DL (ref 70–110)
GLUCOSE SERPL-MCNC: 115 MG/DL (ref 70–110)
HBA1C MFR BLD: 6.4 % (ref 4–5.6)
HBA1C MFR BLD: 6.4 % (ref 4–5.6)
HCT VFR BLD AUTO: 32 % (ref 40–54)
HGB BLD-MCNC: 9.3 GM/DL (ref 14–18)
INR PPP: 1.4 (ref 0.8–1.2)
MAGNESIUM SERPL-MCNC: 2.1 MG/DL (ref 1.6–2.6)
MCH RBC QN AUTO: 22.6 PG (ref 27–31)
MCHC RBC AUTO-ENTMCNC: 29.1 G/DL (ref 32–36)
MCV RBC AUTO: 78 FL (ref 82–98)
OHS QRS DURATION: 120 MS
OHS QRS DURATION: 122 MS
OHS QTC CALCULATION: 446 MS
OHS QTC CALCULATION: 450 MS
PLATELET # BLD AUTO: 152 K/UL (ref 150–450)
PMV BLD AUTO: ABNORMAL FL
POTASSIUM SERPL-SCNC: 4 MMOL/L (ref 3.5–5.1)
POTASSIUM SERPL-SCNC: 4.3 MMOL/L (ref 3.5–5.1)
PROT SERPL-MCNC: 6.7 GM/DL (ref 6–8.4)
PROTHROMBIN TIME: 15.2 SECONDS (ref 9–12.5)
RBC # BLD AUTO: 4.11 M/UL (ref 4.6–6.2)
SODIUM SERPL-SCNC: 139 MMOL/L (ref 136–145)
SODIUM SERPL-SCNC: 139 MMOL/L (ref 136–145)
TROPONIN I SERPL HS-MCNC: 106 NG/L
TROPONIN I SERPL HS-MCNC: 86 NG/L
WBC # BLD AUTO: 6.72 K/UL (ref 3.9–12.7)

## 2025-06-03 PROCEDURE — 93005 ELECTROCARDIOGRAM TRACING: CPT | Mod: HCNC

## 2025-06-03 PROCEDURE — 96374 THER/PROPH/DIAG INJ IV PUSH: CPT | Mod: 59,HCNC

## 2025-06-03 PROCEDURE — 25000003 PHARM REV CODE 250: Mod: HCNC

## 2025-06-03 PROCEDURE — 93010 ELECTROCARDIOGRAM REPORT: CPT | Mod: 76,HCNC,, | Performed by: INTERNAL MEDICINE

## 2025-06-03 PROCEDURE — 99214 OFFICE O/P EST MOD 30 MIN: CPT | Mod: HCNC,,, | Performed by: INTERNAL MEDICINE

## 2025-06-03 PROCEDURE — 83880 ASSAY OF NATRIURETIC PEPTIDE: CPT | Mod: HCNC

## 2025-06-03 PROCEDURE — 84484 ASSAY OF TROPONIN QUANT: CPT | Mod: HCNC

## 2025-06-03 PROCEDURE — 93010 ELECTROCARDIOGRAM REPORT: CPT | Mod: HCNC,,, | Performed by: INTERNAL MEDICINE

## 2025-06-03 PROCEDURE — G0378 HOSPITAL OBSERVATION PER HR: HCPCS | Mod: HCNC

## 2025-06-03 PROCEDURE — 63600175 PHARM REV CODE 636 W HCPCS: Mod: HCNC

## 2025-06-03 PROCEDURE — 36415 COLL VENOUS BLD VENIPUNCTURE: CPT | Mod: HCNC,PN

## 2025-06-03 PROCEDURE — 99222 1ST HOSP IP/OBS MODERATE 55: CPT | Mod: HCNC,AI,, | Performed by: PHYSICIAN ASSISTANT

## 2025-06-03 PROCEDURE — 80197 ASSAY OF TACROLIMUS: CPT | Mod: HCNC

## 2025-06-03 PROCEDURE — 85730 THROMBOPLASTIN TIME PARTIAL: CPT | Mod: HCNC

## 2025-06-03 PROCEDURE — 85027 COMPLETE CBC AUTOMATED: CPT | Mod: HCNC

## 2025-06-03 PROCEDURE — 63600175 PHARM REV CODE 636 W HCPCS: Mod: HCNC | Performed by: HOSPITALIST

## 2025-06-03 PROCEDURE — 83036 HEMOGLOBIN GLYCOSYLATED A1C: CPT | Mod: HCNC | Performed by: HOSPITALIST

## 2025-06-03 PROCEDURE — 80053 COMPREHEN METABOLIC PANEL: CPT | Mod: HCNC

## 2025-06-03 PROCEDURE — 85610 PROTHROMBIN TIME: CPT | Mod: HCNC

## 2025-06-03 PROCEDURE — 99285 EMERGENCY DEPT VISIT HI MDM: CPT | Mod: 25,HCNC

## 2025-06-03 PROCEDURE — 83735 ASSAY OF MAGNESIUM: CPT | Mod: HCNC

## 2025-06-03 PROCEDURE — 83036 HEMOGLOBIN GLYCOSYLATED A1C: CPT | Mod: HCNC

## 2025-06-03 RX ORDER — GLUCAGON 1 MG
1 KIT INJECTION
Status: DISCONTINUED | OUTPATIENT
Start: 2025-06-03 | End: 2025-06-07 | Stop reason: HOSPADM

## 2025-06-03 RX ORDER — TALC
6 POWDER (GRAM) TOPICAL NIGHTLY PRN
Status: DISCONTINUED | OUTPATIENT
Start: 2025-06-03 | End: 2025-06-07 | Stop reason: HOSPADM

## 2025-06-03 RX ORDER — HYDRALAZINE HYDROCHLORIDE 20 MG/ML
10 INJECTION INTRAMUSCULAR; INTRAVENOUS EVERY 6 HOURS PRN
Status: DISCONTINUED | OUTPATIENT
Start: 2025-06-03 | End: 2025-06-07 | Stop reason: HOSPADM

## 2025-06-03 RX ORDER — POLYETHYLENE GLYCOL 3350 17 G/17G
17 POWDER, FOR SOLUTION ORAL 2 TIMES DAILY PRN
Status: DISCONTINUED | OUTPATIENT
Start: 2025-06-03 | End: 2025-06-07 | Stop reason: HOSPADM

## 2025-06-03 RX ORDER — IBUPROFEN 200 MG
16 TABLET ORAL
Status: DISCONTINUED | OUTPATIENT
Start: 2025-06-03 | End: 2025-06-07 | Stop reason: HOSPADM

## 2025-06-03 RX ORDER — ACETAMINOPHEN 500 MG
500 TABLET ORAL EVERY 6 HOURS PRN
COMMUNITY

## 2025-06-03 RX ORDER — ATORVASTATIN CALCIUM 40 MG/1
80 TABLET, FILM COATED ORAL DAILY
Status: DISCONTINUED | OUTPATIENT
Start: 2025-06-04 | End: 2025-06-07 | Stop reason: HOSPADM

## 2025-06-03 RX ORDER — IBUPROFEN 200 MG
16 TABLET ORAL
Status: DISCONTINUED | OUTPATIENT
Start: 2025-06-03 | End: 2025-06-03

## 2025-06-03 RX ORDER — GLUCAGON 1 MG
1 KIT INJECTION
Status: DISCONTINUED | OUTPATIENT
Start: 2025-06-03 | End: 2025-06-03

## 2025-06-03 RX ORDER — PREDNISONE 5 MG/1
5 TABLET ORAL DAILY
Status: CANCELLED | OUTPATIENT
Start: 2025-06-04

## 2025-06-03 RX ORDER — PREDNISONE 5 MG/1
5 TABLET ORAL DAILY
Status: DISCONTINUED | OUTPATIENT
Start: 2025-06-04 | End: 2025-06-07 | Stop reason: HOSPADM

## 2025-06-03 RX ORDER — HYDRALAZINE HYDROCHLORIDE 20 MG/ML
10 INJECTION INTRAMUSCULAR; INTRAVENOUS EVERY 6 HOURS PRN
Status: DISCONTINUED | OUTPATIENT
Start: 2025-06-03 | End: 2025-06-03

## 2025-06-03 RX ORDER — DOXAZOSIN 2 MG/1
8 TABLET ORAL DAILY
Status: CANCELLED | OUTPATIENT
Start: 2025-06-04

## 2025-06-03 RX ORDER — POLYETHYLENE GLYCOL 3350 17 G/17G
17 POWDER, FOR SOLUTION ORAL 2 TIMES DAILY PRN
Status: CANCELLED | OUTPATIENT
Start: 2025-06-03

## 2025-06-03 RX ORDER — FAMOTIDINE 20 MG/1
20 TABLET, FILM COATED ORAL DAILY
Status: CANCELLED | OUTPATIENT
Start: 2025-06-03

## 2025-06-03 RX ORDER — VALSARTAN 160 MG/1
320 TABLET ORAL DAILY
Status: DISCONTINUED | OUTPATIENT
Start: 2025-06-03 | End: 2025-06-04

## 2025-06-03 RX ORDER — FUROSEMIDE 10 MG/ML
80 INJECTION INTRAMUSCULAR; INTRAVENOUS DAILY
Status: DISCONTINUED | OUTPATIENT
Start: 2025-06-04 | End: 2025-06-04

## 2025-06-03 RX ORDER — HYDRALAZINE HYDROCHLORIDE 20 MG/ML
2 INJECTION INTRAMUSCULAR; INTRAVENOUS EVERY 6 HOURS PRN
Status: DISCONTINUED | OUTPATIENT
Start: 2025-06-03 | End: 2025-06-03

## 2025-06-03 RX ORDER — TACROLIMUS 0.5 MG/1
1 CAPSULE ORAL EVERY MORNING
Status: CANCELLED | OUTPATIENT
Start: 2025-06-03

## 2025-06-03 RX ORDER — GABAPENTIN 300 MG/1
300 CAPSULE ORAL 2 TIMES DAILY
Status: DISCONTINUED | OUTPATIENT
Start: 2025-06-03 | End: 2025-06-07 | Stop reason: HOSPADM

## 2025-06-03 RX ORDER — FUROSEMIDE 10 MG/ML
80 INJECTION INTRAMUSCULAR; INTRAVENOUS EVERY 12 HOURS
Status: DISCONTINUED | OUTPATIENT
Start: 2025-06-04 | End: 2025-06-03

## 2025-06-03 RX ORDER — ATORVASTATIN CALCIUM 40 MG/1
80 TABLET, FILM COATED ORAL DAILY
Status: CANCELLED | OUTPATIENT
Start: 2025-06-04

## 2025-06-03 RX ORDER — INSULIN ASPART 100 [IU]/ML
0-10 INJECTION, SOLUTION INTRAVENOUS; SUBCUTANEOUS
Status: DISCONTINUED | OUTPATIENT
Start: 2025-06-03 | End: 2025-06-07 | Stop reason: HOSPADM

## 2025-06-03 RX ORDER — HYDRALAZINE HYDROCHLORIDE 25 MG/1
100 TABLET, FILM COATED ORAL EVERY 8 HOURS
Status: CANCELLED | OUTPATIENT
Start: 2025-06-03

## 2025-06-03 RX ORDER — FUROSEMIDE 10 MG/ML
80 INJECTION INTRAMUSCULAR; INTRAVENOUS
Status: COMPLETED | OUTPATIENT
Start: 2025-06-03 | End: 2025-06-03

## 2025-06-03 RX ORDER — IBUPROFEN 200 MG
24 TABLET ORAL
Status: DISCONTINUED | OUTPATIENT
Start: 2025-06-03 | End: 2025-06-07 | Stop reason: HOSPADM

## 2025-06-03 RX ORDER — VALSARTAN 160 MG/1
320 TABLET ORAL DAILY
Status: CANCELLED | OUTPATIENT
Start: 2025-06-04

## 2025-06-03 RX ORDER — GABAPENTIN 300 MG/1
300 CAPSULE ORAL 2 TIMES DAILY
Status: CANCELLED | OUTPATIENT
Start: 2025-06-03

## 2025-06-03 RX ORDER — INSULIN ASPART 100 [IU]/ML
.2-1 INJECTION, SOLUTION INTRAVENOUS; SUBCUTANEOUS CONTINUOUS
Status: DISCONTINUED | OUTPATIENT
Start: 2025-06-03 | End: 2025-06-07 | Stop reason: HOSPADM

## 2025-06-03 RX ORDER — ASPIRIN 81 MG/1
81 TABLET ORAL DAILY
COMMUNITY

## 2025-06-03 RX ORDER — NALOXONE HCL 0.4 MG/ML
0.02 VIAL (ML) INJECTION
Status: DISCONTINUED | OUTPATIENT
Start: 2025-06-03 | End: 2025-06-07 | Stop reason: HOSPADM

## 2025-06-03 RX ORDER — ONDANSETRON 8 MG/1
8 TABLET, ORALLY DISINTEGRATING ORAL EVERY 8 HOURS PRN
Status: DISCONTINUED | OUTPATIENT
Start: 2025-06-03 | End: 2025-06-07 | Stop reason: HOSPADM

## 2025-06-03 RX ORDER — VALSARTAN 160 MG/1
320 TABLET ORAL DAILY
Status: DISCONTINUED | OUTPATIENT
Start: 2025-06-04 | End: 2025-06-03

## 2025-06-03 RX ORDER — SODIUM CHLORIDE 0.9 % (FLUSH) 0.9 %
10 SYRINGE (ML) INJECTION EVERY 12 HOURS PRN
Status: DISCONTINUED | OUTPATIENT
Start: 2025-06-03 | End: 2025-06-07 | Stop reason: HOSPADM

## 2025-06-03 RX ORDER — TACROLIMUS 1 MG/1
1 CAPSULE ORAL 2 TIMES DAILY
Status: DISCONTINUED | OUTPATIENT
Start: 2025-06-03 | End: 2025-06-07 | Stop reason: HOSPADM

## 2025-06-03 RX ORDER — ISOSORBIDE MONONITRATE 30 MG/1
30 TABLET, EXTENDED RELEASE ORAL DAILY
Status: DISCONTINUED | OUTPATIENT
Start: 2025-06-03 | End: 2025-06-04

## 2025-06-03 RX ORDER — IBUPROFEN 200 MG
24 TABLET ORAL
Status: DISCONTINUED | OUTPATIENT
Start: 2025-06-03 | End: 2025-06-03

## 2025-06-03 RX ORDER — SODIUM CHLORIDE 0.9 % (FLUSH) 0.9 %
10 SYRINGE (ML) INJECTION
Status: DISCONTINUED | OUTPATIENT
Start: 2025-06-03 | End: 2025-06-07 | Stop reason: HOSPADM

## 2025-06-03 RX ORDER — HYDRALAZINE HYDROCHLORIDE 50 MG/1
100 TABLET, FILM COATED ORAL EVERY 8 HOURS
Status: DISCONTINUED | OUTPATIENT
Start: 2025-06-03 | End: 2025-06-06

## 2025-06-03 RX ORDER — HYDRALAZINE HYDROCHLORIDE 25 MG/1
100 TABLET, FILM COATED ORAL
Status: COMPLETED | OUTPATIENT
Start: 2025-06-03 | End: 2025-06-03

## 2025-06-03 RX ADMIN — ISOSORBIDE MONONITRATE 30 MG: 30 TABLET, EXTENDED RELEASE ORAL at 06:06

## 2025-06-03 RX ADMIN — HYDRALAZINE HYDROCHLORIDE 100 MG: 25 TABLET ORAL at 12:06

## 2025-06-03 RX ADMIN — TACROLIMUS 1 MG: 1 CAPSULE ORAL at 06:06

## 2025-06-03 RX ADMIN — FUROSEMIDE 80 MG: 10 INJECTION, SOLUTION INTRAVENOUS at 11:06

## 2025-06-03 RX ADMIN — HYDRALAZINE HYDROCHLORIDE 10 MG: 20 INJECTION, SOLUTION INTRAMUSCULAR; INTRAVENOUS at 05:06

## 2025-06-03 RX ADMIN — RIVAROXABAN 15 MG: 15 TABLET, FILM COATED ORAL at 06:06

## 2025-06-03 RX ADMIN — HYDRALAZINE HYDROCHLORIDE 100 MG: 50 TABLET ORAL at 09:06

## 2025-06-03 RX ADMIN — VALSARTAN 320 MG: 160 TABLET, FILM COATED ORAL at 03:06

## 2025-06-03 RX ADMIN — GABAPENTIN 300 MG: 300 CAPSULE ORAL at 09:06

## 2025-06-03 NOTE — NURSING
Pt arrived to floor via transport at 1800. Vitals obtained- see flowsheet. Oriented to room and instructed on call light usage. Pt AAOx4, on RA and able to make needs known. 4 eyes skin assessment completed. Pt ambulatory in room and to restroom independently. Home Insulin pump present- endocrine MD saw pt in ED. Bed low and locked. Call light in reach. No concerns voiced at this time.

## 2025-06-03 NOTE — CONSULTS
[facility] - Emergency Dept  Endocrinology  Diabetes Consult Note    Consult Requested by: Devin Dubois MD   Reason for admit: <principal problem not specified>    HISTORY OF PRESENT ILLNESS:  Reason for Consult: Management of T2DM, Hyperglycemia      Diabetes diagnosis year:       Home Diabetes Medications:    OMNIPOD 5  0.6 u/hr mn-0600 , 9p-mn, 0.7 u/hr 6a-9p   Target 120-130 mg/dl   Iob 3 hours  Max bolus 20 units   Max basal 2 u/hr   Isf 40 mn-mn  ICR 1 to 7.5      Presets:  Snack: 4 units (28g)  Small Meal: 8 units (56g)  Medium Meal: 12 units (90g)  Large Meal: 15 units (114g)  Super Large Meal: 18 units (130g)     How often checking glucose at home? >4 x day   BG readings on regimen: 150s  Hypoglycemia on the regimen?  No  Missed doses on regimen?  No     Diabetes Complications include:     Hyperglycemia     Complicating diabetes co morbidities:   S/p Kidney tx 2016         HPI: his is a 69-year-old male with a history of CVA, CKD status post transplant in 2016, diabetes, hypertension, atrial fibrillation presenting to the ED with shortness of breath.  Patient reports that he has had shortness of breath over the last couple of days, since his most recent discharge for pneumonia.  Reports that it has been worsening and is having trouble sleeping because when he does, states he feels like he is going to die. Endocrine consulted for BG management.     Interval HPI:   Overnight events: Patient in room OBS08/EDOU08. Blood glucose stable. BG at goal on current insulin regimen (Home Insulin Pump). Steroid use- None.      Renal function-   Lab Results   Component Value Date    CREATININE 3.1 (H) 2025        Vasopressors-  None     Diet Low Sodium (2 gm) Fluid - 1500mL     Eatin%  Nausea: No  Hypoglycemia and intervention: No  Fever: No  TPN and/or TF: No    PMH, PSH, FH, SH updated and reviewed     ROS:  Review of Systems   Respiratory:  Positive for shortness of breath.    Cardiovascular:   "Negative for chest pain.   Gastrointestinal:  Negative for constipation, diarrhea, nausea and vomiting.   Endocrine: Negative for polydipsia and polyuria.       Current Medications and/or Treatments Impacting Glycemic Control  Immunotherapy:    Immunosuppressants           Stop Route Frequency     tacrolimus capsule 1 mg         -- Oral 2 times daily          Steroids:   Hormones (From admission, onward)      Start     Stop Route Frequency Ordered    06/04/25 0900  predniSONE tablet 5 mg         -- Oral Daily 06/03/25 1421    06/03/25 1424  melatonin tablet 6 mg         -- Oral Nightly PRN 06/03/25 1326          Pressors:    Autonomic Drugs (From admission, onward)      None          Hyperglycemia/Diabetes Medications:   Antihyperglycemics (From admission, onward)      None             PHYSICAL EXAMINATION:  Vitals:    06/03/25 1430   BP: (!) 196/90   Pulse: 65   Resp: 20   Temp:      Body mass index is 26.38 kg/m².     Physical Exam  Constitutional:       General: He is not in acute distress.     Appearance: Normal appearance. He is not ill-appearing.   HENT:      Head: Normocephalic and atraumatic.      Right Ear: External ear normal.      Left Ear: External ear normal.      Nose: Nose normal.   Neurological:      Mental Status: He is alert.   Psychiatric:         Mood and Affect: Mood normal.         Behavior: Behavior normal.            Labs Reviewed and Include   Recent Labs   Lab 06/03/25  1007   *   CALCIUM 8.9   ALBUMIN 3.3*   PROT 6.7      K 4.3   CO2 22*      BUN 58*   CREATININE 3.1*   ALKPHOS 58   ALT <5*   AST 14   BILITOT 0.7     Lab Results   Component Value Date    WBC 5.31 05/25/2025    HGB 8.8 (L) 05/25/2025    HCT 29.0 (L) 05/25/2025    MCV 75 (L) 05/25/2025     (L) 05/25/2025     No results for input(s): "TSH", "FREET4" in the last 168 hours.  Lab Results   Component Value Date    HGBA1C 6.4 (H) 06/03/2025       Nutritional status:   Body mass index is 26.38 kg/m².  Lab " "Results   Component Value Date    ALBUMIN 3.3 (L) 06/03/2025    ALBUMIN 2.6 (L) 05/25/2025    ALBUMIN 2.6 (L) 05/24/2025     No results found for: "PREALBUMIN"    Estimated Creatinine Clearance: 26.1 mL/min (A) (based on SCr of 3.1 mg/dL (H)).    Accu-Checks  No results for input(s): "POCTGLUCOSE" in the last 72 hours.     ASSESSMENT and PLAN    Renal/  PHUONG (acute kidney injury)  Titrate insulin slowly to avoid hypoglycemia as the risk of hypoglycemia increases with decreased creatinine clearance.        Endocrine  Insulin pump in place  At time of evaluation, pt meets criteria to continue home insulin pump usage.  - Has all adequate supplies   - Insulin pump site change on 6/3/25.    - Bolus settings reviewed    - No changes to home regimen.   - Nurse to check BG qac/hs/0200 & record in epic   - Patient to input glucose into pump and use bolus wizard for prandial needs   - Will continue to monitor accuchecks and titrate insulin as clinically indicated .     - Discussed above plan with patient, patient verbalized understanding.   - Understands in case of pump malfunction or cognitive decline in which pt can no longer safely use insulin pump, will transition to SC MDI        If pump malfunctions or is disconnected, please call endocrine.         Type 2 diabetes mellitus with stage 4 chronic kidney disease, with long-term current use of insulin  BG goal: 140-180    - Continue Home insulin pump   - POCT Glucose before meals, at bedtime and at 2 am  - Hypoglycemia protocol in place      ** Please notify Endocrine for any change and/or advance in diet**  ** Please call Endocrine for any BG related issues **     Discharge Planning:   TBD. Please notify endocrinology prior to discharge.            Plan discussed with patient, family, and RN at bedside.     Chris Payan PA-C  Endocrinology  [facility] - [facility] Dept  "

## 2025-06-03 NOTE — ASSESSMENT & PLAN NOTE
Patient has Combined Systolic and Diastolic heart failure that is Acute on chronic. On presentation their CHF was decompensated. Evidence of decompensated CHF on presentation includes: edema, crackles on lung auscultation, orthopnea, and shortness of breath. The etiology of their decompensation is likely the stoppage of some of her BP and HF medications.  Most recent BNP and echo results are listed below.  Recent Labs     06/03/25  1007   BNP 4,062*     Latest ECHO  Results for orders placed during the hospital encounter of 05/22/25    Echo    Interpretation Summary    Left Ventricle: The left ventricle is normal in size. Moderately increased ventricular mass. Increased wall thickness. Mild septal thickening. There is moderate concentric hypertrophy. Mild global hypokinesis and regional wall motion abnormalities present. See diagram for wall motion findings. There is mildly reduced systolic function with a visually estimated ejection fraction of 45 - 50%. Ejection fraction is approximately 48%. Quantitated ejection fraction is 45%. There is indeterminate diastolic function.    Right Ventricle: The right ventricle is normal in size Wall thickness is normal. Systolic function is normal.    Left Atrium: The left atrium is severely dilated    Aortic Valve: There is mild aortic valve sclerosis. There is mild to mild-moderate aortic regurgitation.    Mitral Valve: There is mild regurgitation.    Tricuspid Valve: There is mild regurgitation.    Pulmonary Artery: There is mild pulmonary hypertension. The estimated pulmonary artery systolic pressure is 46 mmHg.    IVC/SVC: Elevated venous pressure at 15 mmHg.    Current Heart Failure Medications  valsartan tablet 320 mg, Daily, Oral  hydrALAZINE tablet 100 mg, Every 8 hours, Oral  hydrALAZINE injection 10 mg, Every 6 hours PRN, Intravenous  furosemide injection 80 mg, Daily, Intravenous    Plan  - Monitor strict I&Os and daily weights.    - Place on telemetry  - Low sodium  diet  - Place on fluid restriction of 1.5 L.   - Cardiology has not been consulted  - The patient's volume status is worsening as indicated by edema, crackles on lung auscultation, weight gain, orthopnea, and shortness of breath. Will continue current treatment

## 2025-06-03 NOTE — ED PROVIDER NOTES
Encounter Date: 6/3/2025       History     Chief Complaint   Patient presents with    Shortness of Breath    Palpitations     Hx Afib     HPI    Patient is a 69-year-old male with a history of CVA, CKD status post transplant in 2016, diabetes, hypertension, atrial fibrillation presenting to the ED with shortness of breath.  Patient reports that he has had shortness of breath over the last couple of days, since his most recent discharge for pneumonia.  Reports that it has been worsening and is having trouble sleeping because when he does, states he feels like he is going to die.    He denies fever, chest pain, upper respiratory symptoms, nausea, vomiting, abdominal pain.  No history of DVT or PE, juice, recent surgeries or travel.    Of note, patient reports left lower extremity pain with ambulation and has been told that he had narrowing of the vessel may need stents.    Review of patient's allergies indicates:   Allergen Reactions    Nifedipine Other (See Comments)     Gingival hyperplasia     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    CKD (chronic kidney disease) stage 4, GFR 15-29 ml/min 07/29/2014    Colon polyps 2014    Depression - situational 07/29/2014    Diabetes type 2 since 2000 07/29/2014    Diabetic neuropathy 07/29/2014    Encounter for blood transfusion     History of cardioversion 10/03/2019    History of hepatitis C, s/p successful Rx w/ SVR24 - 9/2017 07/29/2014    Genotype 1a, treatment naive 10/2014 liver biopsy - grade 1 / stage 1 Completed Harvoni w/ SVR    Hyperlipidemia 07/29/2014    Hypertension     Neuropathy     Organ transplant candidate 07/29/2014    Pancreatitis     S/P cadaveric kidney transplant 11/5/2016. ESRD secondary to HTN/DMII 11/05/2016    Type 2 diabetes mellitus with stage 3a chronic kidney disease, with long-term  current use of insulin 07/29/2014     Past Surgical History:   Procedure Laterality Date    ABLATION OF ARRHYTHMOGENIC FOCUS FOR ATRIAL FIBRILLATION N/A 6/11/2024    Procedure: Ablation atrial fibrillation;  Surgeon: Bronson Bowden MD;  Location: [facility] EP LAB;  Service: Cardiology;  Laterality: N/A;  AF, FELICIANO (cx if SR), PVI, RFA, Carto, Gen, GP, 3 Prep    ABLATION, ATRIAL FLUTTER, TYPICAL  6/11/2024    Procedure: Ablation, Atrial Flutter, Typical;  Surgeon: Bronson Bowden MD;  Location: [facility] EP LAB;  Service: Cardiology;;    APPENDECTOMY      BONE MARROW BIOPSY Left 6/26/2024    Procedure: Biopsy-bone marrow;  Surgeon: Bridger Zapata MD;  Location: [facility] ENDO (4TH FLR);  Service: Oncology;  Laterality: Left;  6/24-pt knows to hold aspirin and eliquis as instructed by hem/onc, precall complete-Kpvt    CARDIOVERSION  6/11/2024    Procedure: Cardioversion;  Surgeon: Bronson Bowden MD;  Location: [facility] EP LAB;  Service: Cardiology;;    CATARACT EXTRACTION W/  INTRAOCULAR LENS IMPLANT Right 3/13/2024    Procedure: EXTRACTION, CATARACT, WITH IOL INSERTION;  Surgeon: Jennifer Palacio MD;  Location: [facility] OR;  Service: Ophthalmology;  Laterality: Right;    CATARACT EXTRACTION W/  INTRAOCULAR LENS IMPLANT Left 4/10/2024    Procedure: EXTRACTION, CATARACT, WITH IOL INSERTION;  Surgeon: Jennifer Palacio MD;  Location: [facility] OR;  Service: Ophthalmology;  Laterality: Left;    COLONOSCOPY N/A 12/21/2020    Procedure: COLONOSCOPY;  Surgeon: Tico Bell MD;  Location: [facility] ENDO (4TH FLR);  Service: Endoscopy;  Laterality: N/A;  ok to hold eliquis per Dr. Valadez, see telephone encounter 11/13/2020-MS  covid test 12/18 [place]    ECHOCARDIOGRAM,TRANSESOPHAGEAL N/A 2/3/2025    Procedure: Transesophageal echo (FELICIANO) intra-procedure log documentation;  Surgeon: Grayson Lin III, MD;  Location: [facility] EP LAB;  Service: Cardiology;  Laterality: N/A;    KIDNEY TRANSPLANT      TRANSESOPHAGEAL ECHOCARDIOGRAPHY N/A 8/26/2019     Procedure: ECHOCARDIOGRAM, TRANSESOPHAGEAL;  Surgeon: Dolores Diagnostic Provider;  Location: [facility] EP LAB;  Service: Cardiology;  Laterality: N/A;    TRANSESOPHAGEAL ECHOCARDIOGRAPHY N/A 6/11/2024    Procedure: ECHOCARDIOGRAM, TRANSESOPHAGEAL;  Surgeon: Grayson Lin III, MD;  Location: [facility] EP LAB;  Service: Cardiology;  Laterality: N/A;    TREATMENT OF CARDIAC ARRHYTHMIA N/A 8/26/2019    Procedure: CARDIOVERSION;  Surgeon: Bronson Bowden MD;  Location: [facility] EP LAB;  Service: Cardiology;  Laterality: N/A;  AF, FELICIANO, DCCV, MAC, GP, 3 PREP    TREATMENT OF CARDIAC ARRHYTHMIA N/A 2/3/2025    Procedure: Cardioversion or Defibrillation;  Surgeon: Bronson Bowden MD;  Location: [facility] EP LAB;  Service: Cardiology;  Laterality: N/A;  AF, FELICIANO, DCCV, MAC, GP, 3 Prep     Family History   Problem Relation Name Age of Onset    Diabetes Mother      Hypertension Mother      Heart disease Mother          CAD with PCI    Heart disease Father      Cancer Brother 2         one sister with breast cancer    Hypertension Brother 2         one sister with HTN and borderline DM    Stroke Neg Hx      Kidney disease Neg Hx      Colon cancer Neg Hx      Esophageal cancer Neg Hx       Social History[1]    Physical Exam     Initial Vitals [06/03/25 0919]   BP Pulse Resp Temp SpO2   (!) 176/96 (!) 117 18 97.9 °F (36.6 °C) 95 %      MAP       --         Physical Exam    Constitutional: He appears well-developed and well-nourished. He is not diaphoretic. No distress.   HENT:   Head: Normocephalic and atraumatic.   Cardiovascular:  Normal rate, regular rhythm and normal heart sounds.           Pulmonary/Chest: Breath sounds normal. No respiratory distress. He has no wheezes.   Abdominal: Abdomen is soft. He exhibits no distension. There is no abdominal tenderness.   Musculoskeletal:         General: Edema present.     Neurological: He is alert.   Skin: Skin is warm and dry.   Psychiatric: He has a normal mood and affect. Thought content  normal.         ED Course   Procedures  Labs Reviewed   COMPREHENSIVE METABOLIC PANEL - Abnormal       Result Value    Sodium 139      Potassium 4.3      Chloride 105      CO2 22 (*)     Glucose 115 (*)     BUN 58 (*)     Creatinine 3.1 (*)     Calcium 8.9      Protein Total 6.7      Albumin 3.3 (*)     Bilirubin Total 0.7      ALP 58      AST 14      ALT <5 (*)     Anion Gap 12      eGFR 21 (*)    B-TYPE NATRIURETIC PEPTIDE - Abnormal    BNP 4,062 (*)    TROPONIN I HIGH SENSITIVITY - Abnormal    Troponin High Sensitive 106 (*)    TROPONIN I HIGH SENSITIVITY - Abnormal    Troponin High Sensitive 86 (*)    HEMOGLOBIN A1C - Abnormal    Hemoglobin A1c 6.4 (*)     Estimated Average Glucose 137 (*)    MAGNESIUM - Normal    Magnesium  2.1     PROTEIN / CREATININE RATIO, URINE   POCT GLUCOSE MONITORING CONTINUOUS     EKG Readings: (Independently Interpreted)   Rhythm: Atrial Fibrillation. Heart Rate: 99. ST Segment Depression: V5. T Waves Flipped: AVL and V6. Clinical Impression: Atrial Fibrillation     ECG Results              Repeat EKG 12-lead (Final result)        Collection Time Result Time QRS Duration OHS QTC Calculation    06/03/25 09:53:20 06/03/25 09:59:22 120 450                     Final result by Interface, Lab In [facility] (06/03/25 09:59:30)                   Narrative:    Test Reason : R06.02,    Vent. Rate :  74 BPM     Atrial Rate :  84 BPM     P-R Int :    ms          QRS Dur : 120 ms      QT Int : 406 ms       P-R-T Axes :  83  53  81 degrees    QTcB Int : 450 ms    AFlutter  Low voltage QRS  Low septal forces ; Abnormal R wave progression in the precordial leads -  Cannot rule out Anterior infarct ,age undetermined  Non-specific ST-T abn  Abnormal ECG  When compared with ECG of 03-Jun-2025 09:25,  Questionable change in The axis  Confirmed by Tejas Carter ([id number]) on 6/3/2025 9:59:21 AM    Referred By: AAAREFERRAL SELF           Confirmed By: Tejas Carter                                     EKG 12-lead (Final  result)        Collection Time Result Time QRS Duration OHS QTC Calculation    06/03/25 09:25:29 06/03/25 09:50:42 122 446                     Final result by Interface, Lab In [facility] (06/03/25 09:50:44)                   Narrative:    Test Reason : R00.2,    Vent. Rate :  99 BPM     Atrial Rate :    BPM     P-R Int :    ms          QRS Dur : 122 ms      QT Int : 348 ms       P-R-T Axes :     -4 103 degrees    QTcB Int : 446 ms    AFlutter/ Atrial fibrillation with a competing junctional pacemaker with  premature ventricular or aberrantly conducted complexes  Nonspecific intraventricular conduction delay  Low septal forces ; Abnormal R wave progression in the precordial leads  -cannot exclude septal infarct ve due to LBBB-like IVCD  ST and T wave abnormality, consider lateral ischemia  Abnormal ECG  When compared with ECG of 22-May-2025 09:33,  No significant change was found      Confirmed by Tejas Carter ([id number]) on 6/3/2025 9:50:40 AM    Referred By:            Confirmed By: Tejas Carter                                  Imaging Results              X-Ray Chest AP Portable (Final result)  Result time 06/03/25 11:07:58      Final result by Mo Sabillon III, MD (06/03/25 11:07:58)                   Impression:      Cardiomegaly with pulmonary edema and pleural fluid is worrisome for CHF.      Electronically signed by: Mo Sabillon MD  Date:    06/03/2025  Time:    11:07               Narrative:    EXAMINATION:  XR CHEST AP PORTABLE    CLINICAL HISTORY:  Shortness of breath    FINDINGS:  Chest one view AP portable.    There is cardiomegaly.  There is moderate edema.  There are small pleural effusions.  There is aortic plaque and DJD.    Lungs appear worse from the prior study.                                       Medications   sodium chloride 0.9% flush 10 mL (has no administration in time range)   melatonin tablet 6 mg (has no administration in time range)   naloxone 0.4 mg/mL injection 0.02 mg (has no  administration in time range)   atorvastatin tablet 80 mg (80 mg Oral Given 6/4/25 0834)   gabapentin capsule 300 mg (300 mg Oral Given 6/4/25 0834)   tacrolimus capsule 1 mg (1 mg Oral Given 6/4/25 0834)   predniSONE tablet 5 mg (5 mg Oral Given 6/4/25 0835)   rivaroxaban tablet 15 mg (15 mg Oral Given 6/3/25 1814)   polyethylene glycol packet 17 g (has no administration in time range)   valsartan tablet 320 mg (320 mg Oral Given 6/4/25 0835)   hydrALAZINE tablet 100 mg (100 mg Oral Given 6/4/25 0638)   sodium chloride 0.9% flush 10 mL (has no administration in time range)   ondansetron disintegrating tablet 8 mg (has no administration in time range)   hydrALAZINE injection 10 mg (10 mg Intravenous Given 6/3/25 1725)   glucose chewable tablet 16 g (has no administration in time range)   glucose chewable tablet 24 g (has no administration in time range)   dextrose 50% injection 12.5 g (has no administration in time range)   dextrose 50% injection 25 g (has no administration in time range)   glucagon (human recombinant) injection 1 mg (has no administration in time range)   insulin aspart U-100 insulin pump from home (has no administration in time range)   insulin aspart U-100 insulin pump from home 0-10 Units (has no administration in time range)   doxazosin tablet 8 mg (8 mg Oral Given 6/4/25 0841)   furosemide injection 80 mg (80 mg Intravenous Given 6/4/25 0834)   cloNIDine 0.1 mg/24 hr td ptwk 1 patch (has no administration in time range)   furosemide injection 80 mg (80 mg Intravenous Given 6/3/25 1127)   hydrALAZINE tablet 100 mg (100 mg Oral Given 6/3/25 1259)   potassium bicarbonate disintegrating tablet 30 mEq (30 mEq Oral Given 6/4/25 0834)     Medical Decision Making  Amount and/or Complexity of Data Reviewed  Labs: ordered. Decision-making details documented in ED Course.  Radiology: ordered.    Risk  Prescription drug management.    Patient is a 69-year-old male with a history of CVA, CKD status post  transplant in 2016, diabetes, hypertension, atrial fibrillation presenting to the ED with shortness of breath.      On presentation, patient is overall well-appearing however he is tachycardic and hypertensive.    Differential diagnosis including pneumonia, CHF exacerbation, atrial arrhythmia, etc..    Cardiac labs ordered in addition to chest x-ray.  Overall workup consistent with fluid overload, BNP over 4000.  Given his CKD, he was given Lasix here but we will likely need additional diuretics.    He was admitted to Hospital Medicine.           ED Course as of 25 1140   Tue 2025   1225 Troponin I High Sensitivity(!): 86  downtrending [DR]   1225 BNP(!): 4,062  Acutely elevated [DR]      ED Course User Index  [DR] Jennifer Conti DO                           Clinical Impression:  Final diagnoses:  [R00.2] Palpitation  [R06.02] Shortness of breath  [I50.9] Acute on chronic congestive heart failure, unspecified heart failure type (Primary)          ED Disposition Condition    Observation                     Jennifer Conti DO  Resident  25 [id number]    Attending Note:  Physician Attestation Statement: I have personally seen and examined this patient. As the supervising MD I agree with the above history. As the supervising MD I agree with the above PE. As the supervising MD I agree with the above treatment, course, plan, and disposition.          [1]   Social History  Tobacco Use    Smoking status: Former     Current packs/day: 0.00     Average packs/day: 0.5 packs/day for 32.0 years (16.0 ttl pk-yrs)     Types: Cigarettes     Start date: 1984     Quit date: 2016     Years since quittin.5    Smokeless tobacco: Never   Substance Use Topics    Alcohol use: Yes     Comment: Pt reports occasional beers on Sundays. Pt reports drinking daily prior to ESRD diagnosis.    Drug use: No        Devin Akbar MD  [phone]

## 2025-06-03 NOTE — ED NOTES
Assumed care of the patient. Report received from HE Dash. Pt on continuous cardiac monitoring, continuous pulse oximetry, and automatic BP cuff cycling Q15min. Pt in hospital gown, side rails up X2, bed low and locked, and call light is placed within reach. One family/visitors at bedside at this time. Pt denies any complaints or needs.

## 2025-06-03 NOTE — HPI
69-year-old male with a complex history including CKD s/p kidney transplant (2016, on tacrolimus), atrial fibrillation (on rivaroxaban), hypertension, hyperlipidemia, heart failure with preserved EF, and prior CVA, presents with progressively worsening shortness of breath for the past several days. He endorses orthopnea but denies paroxysmal nocturnal dyspnea, chest pain, or new palpitations. He denies recent dietary changes or non-compliance with medications. He was recently discharged following treatment for pneumonia. In the ED, he was afebrile but severely hypertensive (SBP >200 mmHg). Workup showed BNP >4000 and a troponin peak of 106. CXR revealed cardiomegaly and moderate pulmonary edema, concerning for acute on chronic CHF exacerbation. Diuresis with IV furosemide was initiated, and antihypertensives (hydralazine) restarted.   Esther Bonilla  : 1965  Primary: [facility]*  Secondary:  [facility], [facility], [address]  [phone]   [facility] [phone]        OUTPATIENT PHYSICAL THERAPY:Initial Assessment 2021   ICD-10: Treatment Diagnosis: Pain in left knee (M25.562)   Difficulty in walking, not elsewhere classified (R26.2)  Muscle weakness (generalized) (M62.81)  Precautions/Allergies:   Patient has no known allergies. TREATMENT PLAN:  Effective Dates/Frequency/Duration: Twice per week from 2021 until 2021 (8 weeks). MEDICAL/REFERRING DIAGNOSIS:  Acute pain of left knee [M25.562]  Patellofemoral pain syndrome of left knee [M22.2X2]   DATE OF ONSET: 2021  REFERRING PHYSICIAN: Jodee Dia MD MD Orders: Evaluate and treat  Return MD Appointment: unspecified   INITIAL ASSESSMENT:  Esther Bonilla is a 49 y.o. female who presents to physical therapy for acute exacerbation of chronic L knee pain following her dog leaning on her L knee. This session, pt demonstrated decreased B LE strength/endurance (L LE weaker), slight hypermobility throughout B LEs, antalgic gait, decreased sitting tolerance, decreased standing tolerance, and decreased functional mobility as evident by a score of 28/80 on the Lower Extremity Functional Scale (with lower scores indicating increased disability). Pt may benefit from skilled PT to address the above listed deficits to improve ability to perform pain-free ADLs/IADLs and to improve overall quality of life prior to discharge.   PROBLEM LIST (Impacting functional limitations):  Decreased Strength  Decreased ADL/Functional Activities  Decreased Transfer Abilities  Decreased Ambulation Ability/Technique  Decreased Balance  Increased Pain  Decreased Activity Tolerance  Decreased Pacing Skills  Increased Fatigue  Edema/Girth INTERVENTIONS PLANNED: (Treatment may consist of any combination of the following)  Balance Exercise  Bed Mobility  Cold  Electrical Stimulation  Family Education  Gait Training  [employer]  Home Exercise Program (HEP)  Manual Therapy  Neuromuscular Re-education/Strengthening  Range of Motion (ROM)  Therapeutic Activites  Therapeutic Exercise/Strengthening  Transcutaneous Electrical Nerve Stimulation (TENS)  Transfer Training  Ultrasound (US)  Vasopneumatic Compression     GOALS: (Goals have been discussed and agreed upon with patient.)  Short-Term Functional Goals: Time Frame: 4 weeks  Pt will be compliant with HEP in order to increase LE strength/endurance/mobility to improve functional mobility and overall quality of life. Pt will improve score on the Lower Extremity Functional Scale to 35/80 in order to demonstrate improved functional mobility and quality of life. Pt will report walking for > 20 minutes with minimal to no increase in pain in order to be able to walk for prolonged periods as needed to perform grocery shopping. Pt will be able to ascend/descend 6 steps with modified independence with rail with safe gait pattern with minimal to no increase in pain in order to improve community mobility. Discharge Goals: Time Frame: 8 weeks  Pt will be independent with HEP in order to increase LE strength/endurance/mobility to improve functional mobility and overall quality of life. Pt will improve score on the Lower Extremity Functional Scale to 42/80 in order to demonstrate improved functional mobility and quality of life. Pt will report walking for > 25 minutes with minimal to no increase in pain in order to be able to walk for prolonged periods as needed to perform grocery shopping. Pt will be able to ascend/descend 12 steps with independence without rail with reciprocal gait pattern with minimal to no increase in pain in order to improve community mobility.     OUTCOME MEASURE:   Tool Used: Lower Extremity Functional Scale (LEFS)  Score:  Initial: 28/80 Most Recent: X/80 (Date: -- )   Interpretation of Score: 20 questions each scored on a 5 point scale with 0 representing \"extreme difficulty or unable to perform\" and 4 representing \"no difficulty\". The lower the score, the greater the functional disability. 80/80 represents no disability. Minimal detectable change is 9 points. Tool Used: Timed Up and Go (TUG)  Score:  Initial: 6.9 seconds no AD Most Recent: X seconds (Date: -- )   Interpretation of Score: The test measures, in seconds, the time taken by an individual to stand up from a standard arm chair (seat height 46 cm [18 in], arm height 65 cm [25.6 in]), walk a distance of 3 meters (118 in, approx 10 ft), turn, walk back to the chair and sit down. If the individual takes longer than 14 seconds to complete TUG, this indicates risk for falls. FALL RISK:    Ambulatory/Rehab Services H2 Model Falls Risk Assessment   Risk Factors:       No Risk Factors Identified Ability to Rise from Chair:       (1)  Pushes up, successful in one attempt   Falls Prevention Plan:       No modifications necessary   Total: (5 or greater = High Risk): 1   ©2010 [facility] [employer]. All Rights Reserved. [facility] States Patent #[id number]. Federal Law prohibits the replication, distribution or use without written permission from [facility]:     Initial assessment only today: see objective section below for details    MEDICAL NECESSITY:   Patient is expected to demonstrate progress in strength and functional technique to improve ability to perform pain-free standing/ambulation. REASON FOR SERVICES/OTHER COMMENTS:  Patient continues to require modification of therapeutic interventions to increase complexity of exercises.   Total Duration:  PT Patient Time In/Time Out  Time In: 1430  Time Out: 1515    Rehabilitation Potential For Stated Goals: Good  Regarding Anola Nations therapy, I certify that the treatment plan above will be carried out by a therapist or under their direction. Thank you for this referral,  Jeri Bonner DPT     Referring Physician Signature: Elray Kawasaki, MD                 PAIN/SUBJECTIVE:   Initial: 5/10 Post Session:  No pain level reported   HISTORY:   History of Injury/Illness (Reason for Referral): *History per pt or pt's family except where otherwise noted       Location(s) of Injury: L knee       Date of Injury: 5/23/2021       Mechanism of Injury: Pt states her knees are always achey when walks longer distances, but felt a tweak when her dog leaned against the medial side of her L knee and the next couple of days she could barely walk due to pain. States now she hurts constantly       Pain at Worst: 7/10       Aggravating Activities: pt states the pain has been waking her up every night; standing up after prolonged sitting; prolonged standing > 60 minutes, walking > 15 minutes, prolonged sitting with knees flexed, going downstairs more than going upstairs       Pain at Best: 3/10       Easing Activities: changing positions, avoiding aggravating activities, icing, elevation       Other Pertinent Information: Average pain level of 5/10  Past Medical History/Comorbidities:   Ms. Ashly Marlow  has no past medical history on file. Ms. Ashly Marlow  has no past surgical history on file.   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 Current Medications:       Current Outpatient Medications:     escitalopram oxalate (LEXAPRO) 10 mg tablet, escitalopram 10 mg tablet, Disp: , Rfl:     estradioL (ESTRACE) 1 mg tablet, Take 1 mg by mouth daily. , Disp: , Rfl:     levothyroxine (SYNTHROID) 88 mcg tablet, levothyroxine 88 mcg tablet, Disp: , Rfl:     progesterone (PROMETRIUM) 200 mg capsule, Take one at bedtime the 1st-12th of month, Disp: , Rfl:    Date Last Reviewed:  7/19/2021    Number of Personal Factors/Comorbidities that affect the Plan of Care: 1-2: MODERATE COMPLEXITY     EXAMINATION:   Initial assessment on 7/19/2021   Observation/Orthostatic Postural Assessment:    The following postural deficits were noted in sitting: no significant deficits in sitting   The following postural deficits were noted in standing: no significant deficits in standing   Palpation:          Pain noted with pressure at medial L knee; good symmetry at pelvis and with LE length  ROM:    Initial measurement: (on 7/19/2021) Initial measurement: (on 7/19/2021) Reassessment measurement:  Reassessment measurement:      WFL and non-painful throughout L hip, knee, and ankle  Pt slightly hypermobile WFL and non-painful throughout R hip, knee, and ankle  Pt slightly hypermobile       Strength:     Initial measurement: (on 7/19/2021) Initial measurement: (on 7/19/2021)  Reassessment Reassessment    L LE: R LE:     Hip flexion 4/5  4+/5      Hip ER 4/5  4/5      Hip IR 4+/5  4+/5      Hip abduction 4/5  4/5      Hip adduction 3+/5  4/5      Hip extension 4/5  4/5      Knee flexion 4-/5  4/5      Knee extension 5/5  5/5      Ankle DF  5/5  5/5        Special Tests:          Eckert's: + for slight pain and weakness at L at about 100 degrees flexion that improved with medial patellar glide, - at R        Olga's: - B  Neurological Screen:        Myotomes:  WFL        Dermatomes:  No deficits noted  Functional Mobility: Gait/Ambulation: Pt ambulates with no AD with following gait deficits: antalgic gait         Transfers:  Pushes up to stand, reaches back to sit, Alex        Bed Mobility:  WFL  Balance:          WFL      Body Structures Involved:  Nerves  Bones  Joints  Muscles  Ligaments Body Functions Affected:  Sensory/Pain  Neuromusculoskeletal  Movement Related Activities and Participation Affected:  General Tasks and Demands  Mobility  Community, Social and Civic Life   Number of elements (examined above) that affect the Plan of Care: 4+: HIGH COMPLEXITY   CLINICAL PRESENTATION:   Presentation: Stable and uncomplicated: LOW COMPLEXITY   CLINICAL DECISION MAKING:   Use of outcome tool(s) and clinical judgement create a POC that gives a: Questionable prediction of patient's progress: MODERATE COMPLEXITY

## 2025-06-03 NOTE — PLAN OF CARE
[facility] - Emergency Dept  Initial Discharge Assessment       Primary Care Provider: Mima Mack MD    Admission Diagnosis: Shortness of breath [R06.02]    Admission Date: 6/3/2025  Expected Discharge Date:     Pt stated he uses a cane to assist with ambulation and is independent with his ADL's.      Post acute discussed and declined    Pt to d/c home with no needs when ready     Transition of Care Barriers: (P) None    Payor: [employer] MANAGED MEDICARE / Plan: [employer] MEDICARE HMO / Product Type: Capitation /     Extended Emergency Contact Information  Primary Emergency Contact: Erika Zamorano  Address: [address] [place] [address] [facility]  Home Phone: [phone]  Mobile Phone: [phone]  Relation: Spouse    Discharge Plan A: (P) Home  Discharge Plan B: (P) Home      [facility]  [address] [facility] [address]  Phone: [phone] Fax: [phone]    [id number] [address] AT [address] [address]  Phone: [phone] Fax: [phone]      Initial Assessment (most recent)       Adult Discharge Assessment - 06/03/25 1449          Discharge Assessment    Assessment Type Discharge Planning Assessment (P)      Confirmed/corrected address, phone number and insurance Yes (P)      Confirmed Demographics Correct on Facesheet (P)      Source of Information patient (P)      Communicated UBALDO with patient/caregiver Yes (P)      People in Home spouse (P)      Name(s) of People in Home Erika (P)      Facility Arrived From: home (P)      Do you expect to return to your current living situation? Yes (P)      Do you have help at home or someone to help you manage your care at home? No (P)      Prior to hospitilization cognitive status: Alert/Oriented;No Deficits (P)      Current cognitive status: No Deficits;Alert/Oriented (P)      Walking or  Climbing Stairs Difficulty yes (P)      Walking or Climbing Stairs ambulation difficulty, requires equipment (P)      Mobility Management cane (P)      Dressing/Bathing Difficulty no (P)      Home Accessibility stairs to enter home (P)      Number of Stairs, Main Entrance one (P)      Home Layout Able to live on 1st floor (P)      Equipment Currently Used at Home cane, straight (P)      Patient currently being followed by outpatient case management? No (P)      Do you currently have service(s) that help you manage your care at home? No (P)      Do you have any problems affording any of your prescribed medications? No (P)      Is the patient taking medications as prescribed? yes (P)      Who is going to help you get home at discharge? family/friends (P)      How do you get to doctors appointments? family or friend will provide (P)      Are you on dialysis? No (P)      Do you take coumadin? No (P)      Discharge Plan A Home (P)      Discharge Plan B Home (P)      DME Needed Upon Discharge  none (P)      Discharge Plan discussed with: Patient (P)      Transition of Care Barriers None (P)      Does patient/caregiver understand observation status Yes (P)         Physical Activity    On average, how many days per week do you engage in moderate to strenuous exercise (like a brisk walk)? 0 days (P)      On average, how many minutes do you engage in exercise at this level? 0 min (P)         Financial Resource Strain    How hard is it for you to pay for the very basics like food, housing, medical care, and heating? Not very hard (P)         Housing Stability    In the last 12 months, was there a time when you were not able to pay the mortgage or rent on time? No (P)      At any time in the past 12 months, were you homeless or living in a shelter (including now)? No (P)         Transportation Needs    In the past 12 months, has lack of transportation kept you from medical appointments or from getting medications? No (P)       In the past 12 months, has lack of transportation kept you from meetings, work, or from getting things needed for daily living? No (P)         Food Insecurity    Within the past 12 months, you worried that your food would run out before you got the money to buy more. Never true (P)      Within the past 12 months, the food you bought just didn't last and you didn't have money to get more. Never true (P)         Stress    Do you feel stress - tense, restless, nervous, or anxious, or unable to sleep at night because your mind is troubled all the time - these days? Only a little (P)         Social Isolation    How often do you feel lonely or isolated from those around you?  Never (P)         Alcohol Use    Q1: How often do you have a drink containing alcohol? Never (P)      Q2: How many drinks containing alcohol do you have on a typical day when you are drinking? Patient does not drink (P)      Q3: How often do you have six or more drinks on one occasion? Never (P)         Utilities    In the past 12 months has the electric, gas, oil, or water company threatened to shut off services in your home? No (P)         Health Literacy    How often do you need to have someone help you when you read instructions, pamphlets, or other written material from your doctor or pharmacy? Sometimes (P)                    Discharge Plan A and Plan B have been determined by review of patient's clinical status, future medical and therapeutic needs, and coverage/benefits for post-acute care in coordination with multidisciplinary team members.    Paris Bell, AMADEO, MSW, LMSW, RSW   Case Management  [facility]  Email: [email or web address]

## 2025-06-03 NOTE — ED TRIAGE NOTES
Pt states SOB and racing heart, states sharp pains in his left leg near groin and was told he may need stents because US showed blood flow partially occluded in the area. No hx of MI. Reports HTN and is compliant with medication. Pt states he has CHF and is concerned he may have fluid build up.

## 2025-06-03 NOTE — ASSESSMENT & PLAN NOTE
At time of evaluation, pt meets criteria to continue home insulin pump usage.  - Has all adequate supplies   - Insulin pump site change on 6/3/25.    - Bolus settings reviewed    - No changes to home regimen.   - Nurse to check BG qac/hs/0200 & record in epic   - Patient to input glucose into pump and use bolus wizard for prandial needs   - Will continue to monitor accuchecks and titrate insulin as clinically indicated .     - Discussed above plan with patient, patient verbalized understanding.   - Understands in case of pump malfunction or cognitive decline in which pt can no longer safely use insulin pump, will transition to SC MDI        If pump malfunctions or is disconnected, please call endocrine.

## 2025-06-03 NOTE — HPI
Reason for Consult: Management of T2DM, Hyperglycemia      Diabetes diagnosis year: 2000      Home Diabetes Medications:    OMNIPOD 5  0.6 u/hr mn-0600 , 9p-mn, 0.7 u/hr 6a-9p   Target 120-130 mg/dl   Iob 3 hours  Max bolus 20 units   Max basal 2 u/hr   Isf 40 mn-mn  ICR 1 to 7.5      Presets:  Snack: 4 units (28g)  Small Meal: 8 units (56g)  Medium Meal: 12 units (90g)  Large Meal: 15 units (114g)  Super Large Meal: 18 units (130g)     How often checking glucose at home? >4 x day   BG readings on regimen: 150s  Hypoglycemia on the regimen?  No  Missed doses on regimen?  No     Diabetes Complications include:     Hyperglycemia     Complicating diabetes co morbidities:   S/p Kidney tx 2016         HPI: his is a 69-year-old male with a history of CVA, CKD status post transplant in 2016, diabetes, hypertension, atrial fibrillation presenting to the ED with shortness of breath.  Patient reports that he has had shortness of breath over the last couple of days, since his most recent discharge for pneumonia.  Reports that it has been worsening and is having trouble sleeping because when he does, states he feels like he is going to die. Endocrine consulted for BG management.

## 2025-06-03 NOTE — CONSULTS
[facility] Emergency Dept  Transplant Nephrology  Consult Note    Patient Name: Ravi Zamorano  MRN: [id number]  Admission Date: 6/3/2025  Hospital Length of Stay: 0 days  Attending Provider: Devin Dubois MD   Primary Care Physician: Mima Mack MD  Principal Problem:<principal problem not specified>    IP Consult to Kidney/Pancreas Transplant Medicine  Consult performed by: Eulalia Giron DO  Consult ordered by: Zuri Vidales MD        Subjective:     HPI: 69 yr old well known to our service. ESRD secondary to DM s/p a  donor kidney transplant on 2016, AFIB on Xarelto, CHF (EF of 40-45% with mild pHTN), hx of CVA who was recently discharged on 25 after another hospitalization for SOB, cough concerning for recurrent PNA. Ruled out for TB during that admission with patient d/c on Levaquin with outpatient follow up with pulm for possible bronch if symptoms persisted.     Patient states that after discharge he felt recurrence of similar symptoms- noted to have SOB, DOSS and PND where he was not able to lie flat at night to sleep. Per wife, he was able to sleep some in reclined position. Has been taking Lasix 80 mg by mouth when he has swelling of his lower extremity. Patient otherwise denies any fever, HA, sore throat. Does admit to a nonproductive cough. Denies any N/V/diarrhea.     On admission, patient noted to be hypertensive to 200's with rate controlled AFIB. O2 sat of 95-97% on RA. CXR concerning for pulmonary edema with BNP elevated to 4,000. Patient given lasix 80 mg IV.     Past Medical History:   Diagnosis Date    Anticoagulant long-term use     Anxiety 2014    Arthritis     atrial fibrillation     Bilateral diabetic retinopathy 2017    Cardioembolic stroke 2024    CKD (chronic kidney disease) stage 4, GFR 15-29 ml/min 2014    Colon polyps     Depression - situational 2014    Diabetes type 2 since 2014    Diabetic neuropathy  07/29/2014    Encounter for blood transfusion     History of cardioversion 10/03/2019    History of hepatitis C, s/p successful Rx w/ SVR24 - 9/2017 07/29/2014    Genotype 1a, treatment naive 10/2014 liver biopsy - grade 1 / stage 1 Completed Harvoni w/ SVR    Hyperlipidemia 07/29/2014    Hypertension     Neuropathy     Organ transplant candidate 07/29/2014    Pancreatitis     S/P cadaveric kidney transplant 11/5/2016. ESRD secondary to HTN/DMII 11/05/2016    Type 2 diabetes mellitus with stage 3a chronic kidney disease, with long-term current use of insulin 07/29/2014       Past Surgical History:   Procedure Laterality Date    ABLATION OF ARRHYTHMOGENIC FOCUS FOR ATRIAL FIBRILLATION N/A 6/11/2024    Procedure: Ablation atrial fibrillation;  Surgeon: Bronson Bowden MD;  Location: [facility] EP LAB;  Service: Cardiology;  Laterality: N/A;  AF, FELICIANO (cx if SR), PVI, RFA, Carto, Gen, GP, 3 Prep    ABLATION, ATRIAL FLUTTER, TYPICAL  6/11/2024    Procedure: Ablation, Atrial Flutter, Typical;  Surgeon: Bronson Bowden MD;  Location: [facility] EP LAB;  Service: Cardiology;;    APPENDECTOMY      BONE MARROW BIOPSY Left 6/26/2024    Procedure: Biopsy-bone marrow;  Surgeon: Bridger Zapata MD;  Location: [facility] ENDO ([address] [place] [address]);  Service: Oncology;  Laterality: Left;  6/24-pt knows to hold aspirin and eliquis as instructed by hem/onc, precall complete-Kpvt    CARDIOVERSION  6/11/2024    Procedure: Cardioversion;  Surgeon: Bronson Bowden MD;  Location: [facility] EP LAB;  Service: Cardiology;;    CATARACT EXTRACTION W/  INTRAOCULAR LENS IMPLANT Right 3/13/2024    Procedure: EXTRACTION, CATARACT, WITH IOL INSERTION;  Surgeon: Jennifer Palacio MD;  Location: [facility] OR;  Service: Ophthalmology;  Laterality: Right;    CATARACT EXTRACTION W/  INTRAOCULAR LENS IMPLANT Left 4/10/2024    Procedure: EXTRACTION, CATARACT, WITH IOL INSERTION;  Surgeon: Jennifer Palacio MD;  Location: [facility] OR;  Service: Ophthalmology;  Laterality: Left;     COLONOSCOPY N/A 12/21/2020    Procedure: COLONOSCOPY;  Surgeon: Tico Bell MD;  [facility]  Service: Endoscopy;  Laterality: N/A;  ok to hold holli per Dr. Valadez, see telephone encounter 11/13/2020-[place]  covid test 12/18 [place]    ECHOCARDIOGRAM,TRANSESOPHAGEAL N/A 2/3/2025    Procedure: Transesophageal echo (FELICIANO) intra-procedure log documentation;  Surgeon: Grayson Lin III, MD;  Location: [facility] EP LAB;  Service: Cardiology;  Laterality: N/A;    KIDNEY TRANSPLANT      TRANSESOPHAGEAL ECHOCARDIOGRAPHY N/A 8/26/2019    Procedure: ECHOCARDIOGRAM, TRANSESOPHAGEAL;  Surgeon: [facility] Diagnostic Provider;  Location: [facility] EP LAB;  Service: Cardiology;  Laterality: N/A;    TRANSESOPHAGEAL ECHOCARDIOGRAPHY N/A 6/11/2024    Procedure: ECHOCARDIOGRAM, TRANSESOPHAGEAL;  Surgeon: Grayson Lin III, MD;  Location: [facility] EP LAB;  Service: Cardiology;  Laterality: N/A;    TREATMENT OF CARDIAC ARRHYTHMIA N/A 8/26/2019    Procedure: CARDIOVERSION;  Surgeon: Bronson Bowden MD;  Location: [facility] EP LAB;  Service: Cardiology;  Laterality: N/A;  AF, FELICIANO, DCCV, MAC, GP, 3 PREP    TREATMENT OF CARDIAC ARRHYTHMIA N/A 2/3/2025    Procedure: Cardioversion or Defibrillation;  Surgeon: Bronson Bowden MD;  Location: [facility] EP LAB;  Service: Cardiology;  Laterality: N/A;  AF, FELICIANO, DCCV, MAC, GP, 3 Prep       Review of patient's allergies indicates:   Allergen Reactions    Nifedipine Other (See Comments)     Gingival hyperplasia     Current Facility-Administered Medications   Medication Frequency    [START ON 6/4/2025] atorvastatin tablet 80 mg Daily    dextrose 50% injection 12.5 g PRN    dextrose 50% injection 25 g PRN    epoetin tanya injection 4,000 Units Q7 Days    [START ON 6/4/2025] furosemide injection 80 mg Daily    gabapentin capsule 300 mg BID    glucagon (human recombinant) injection 1 mg PRN    glucose chewable tablet 16 g PRN    glucose chewable tablet 24 g PRN    hydrALAZINE injection 10 mg Q6H PRN     "hydrALAZINE tablet 100 mg Q8H    insulin aspart U-100 insulin pump from home 0-10 Units PRN    insulin aspart U-100 insulin pump from home Continuous    isosorbide mononitrate 24 hr tablet 30 mg Daily    melatonin tablet 6 mg Nightly PRN    naloxone 0.4 mg/mL injection 0.02 mg PRN    ondansetron disintegrating tablet 8 mg Q8H PRN    polyethylene glycol packet 17 g BID PRN    [START ON 6/4/2025] predniSONE tablet 5 mg Daily    rivaroxaban tablet 15 mg Daily with dinner    sodium chloride 0.9% flush 10 mL Q12H PRN    sodium chloride 0.9% flush 10 mL PRN    tacrolimus capsule 1 mg BID    valsartan tablet 320 mg Daily     Current Outpatient Medications   Medication    acetaminophen (TYLENOL) 500 MG tablet    aspirin 81 mg Tab    atorvastatin (LIPITOR) 80 MG tablet    blood-glucose sensor (DEXCOM G7 SENSOR) Kayla    cloNIDine 0.1 mg/24 hr td ptwk (CATAPRES) 0.1 mg/24 hr    doxazosin (CARDURA) 8 MG Tab    empagliflozin (JARDIANCE) 10 mg tablet    [Paused] eplerenone (INSPRA) 50 MG Tab    ergocalciferol (ERGOCALCIFEROL) 50,000 unit Cap    famotidine (PEPCID AC ORAL)    gabapentin (NEURONTIN) 300 MG capsule    hydrALAZINE (APRESOLINE) 100 MG tablet    insulin aspart U-100 (NOVOLOG U-100 INSULIN ASPART) 100 unit/mL injection    levoFLOXacin (LEVAQUIN) 750 MG tablet    magnesium oxide (MAG-OX) 400 mg (241.3 mg magnesium) tablet    meclizine (ANTIVERT) 25 mg tablet    polyethylene glycol (MIRALAX) 17 gram PwPk    predniSONE (DELTASONE) 5 MG tablet    rivaroxaban (XARELTO) 15 mg Tab    tacrolimus (PROGRAF) 1 MG Cap    valsartan (DIOVAN) 320 MG tablet    blood sugar diagnostic Strp    blood-glucose meter Misc    insulin pump cart,auto,BT,G6/7 (OMNIPOD 5 G6-G7 PODS, GEN 5,) Crtg    lancets 30 gauge Misc    pen needle, diabetic (BD ULTRA-FINE LO PEN NEEDLE) 32 gauge x 5/32" Ndle     Facility-Administered Medications Ordered in Other Encounters   Medication Frequency    balanced salt irrigation intra-ocular solution 1 drop On Call " Procedure    phenylephrine HCL 10% ophthalmic solution 1 drop PRN    proparacaine 0.5 % ophthalmic solution 1 drop Daily PRN    sodium chloride 0.9% flush 10 mL PRN    TETRAcaine HCl (PF) 0.5 % Drop 1 drop PRN     Family History       Problem Relation (Age of Onset)    Cancer Brother    Diabetes Mother    Heart disease Mother, Father    Hypertension Mother, Brother          Tobacco Use    Smoking status: Former     Current packs/day: 0.00     Average packs/day: 0.5 packs/day for 32.0 years (16.0 ttl pk-yrs)     Types: Cigarettes     Start date: 1984     Quit date: 2016     Years since quittin.5    Smokeless tobacco: Never   Substance and Sexual Activity    Alcohol use: Yes     Comment: Pt reports occasional beers on Sundays. Pt reports drinking daily prior to ESRD diagnosis.    Drug use: No    Sexual activity: Yes     Partners: Female     Review of Systems   Constitutional:  Negative for activity change, appetite change, chills and fever.   HENT:  Negative for congestion, sneezing and sore throat.    Eyes:  Negative for pain and itching.   Respiratory:  Positive for shortness of breath. Negative for apnea, cough and wheezing.    Cardiovascular:  Positive for chest pain.   Gastrointestinal:  Negative for abdominal pain, constipation, diarrhea, nausea and vomiting.   Genitourinary:  Negative for difficulty urinating, dysuria and frequency.   Musculoskeletal:  Negative for back pain, joint swelling and neck pain.   Skin:  Negative for color change.   Neurological:  Negative for dizziness, light-headedness and headaches.   Psychiatric/Behavioral:  Negative for behavioral problems and confusion. The patient is not nervous/anxious.      Objective:     Vital Signs (Most Recent):  Temp: 97.8 °F (36.6 °C) (25 165)  Pulse: 65 (25)  Resp: 16 (25)  BP: (!) 205/95 (25 165)  SpO2: 99 % (25) Vital Signs (24h Range):  Temp:  [97.1 °F (36.2 °C)-97.9 °F (36.6 °C)] 97.8 °F  (36.6 °C)  Pulse:  [] 65  Resp:  [16-20] 16  SpO2:  [95 %-99 %] 99 %  BP: (165-215)/() 205/95     Weight: 90.7 kg (199 lb 15.3 oz) (06/03/25 1319)  Body mass index is 26.38 kg/m².  Body surface area is 2.16 meters squared.    No intake/output data recorded.    Physical Exam  Vitals reviewed.   Constitutional:       General: He is not in acute distress.     Appearance: Normal appearance. He is not ill-appearing or toxic-appearing.      Comments: Appears stated age   HENT:      Head: Normocephalic and atraumatic.      Right Ear: External ear normal.      Left Ear: External ear normal.      Nose: Nose normal.   Eyes:      General: No scleral icterus.     Conjunctiva/sclera: Conjunctivae normal.   Cardiovascular:      Rate and Rhythm: Normal rate and regular rhythm.      Pulses: Normal pulses.      Heart sounds: Normal heart sounds. No murmur heard.     No friction rub.   Pulmonary:      Effort: Pulmonary effort is normal. No respiratory distress.      Breath sounds: Normal breath sounds. No wheezing or rhonchi.      Comments: Crackles at base  Abdominal:      General: Bowel sounds are normal. There is no distension.      Palpations: Abdomen is soft.      Tenderness: There is no abdominal tenderness. There is no guarding.   Musculoskeletal:         General: No swelling or deformity. Normal range of motion.      Cervical back: Normal range of motion and neck supple. No tenderness.      Right lower leg: Edema present.      Left lower leg: Edema present.   Skin:     General: Skin is warm and dry.      Coloration: Skin is not jaundiced.      Findings: No erythema or rash.   Neurological:      General: No focal deficit present.      Mental Status: He is alert and oriented to person, place, and time.      Motor: No weakness.   Psychiatric:         Mood and Affect: Mood normal.         Behavior: Behavior normal.       Assessment/Plan:   69 yr old AAM with hx of ESRD secondary to DM, AFIB on Xarelto, HTN, HLD, hx of  CVA who represents back at the hospital with SOB and paroxysmal nocturnal dyspnea. Of note, patient with recent admission for similar symptoms    1) fluid overload:   - both on exam as well on imaging, patient appears to be fluid overload   - agree with aggressive diuresis with lasix IV   - recommend to repeat TTE   - check urine protein to creatinine ratio  2) PHUONG of transplanted kidney   - stable graft function with creatinine at 3.1 which is slightly elevated from his baseline of 2.4-2.8   - check prograf level in AM. Cont on prograf 1 mg BID for now   - cont to hold Cellcept    - cont on prednisone 5 mg dialy  3) uncontrolled HTN   - on valsartan 320 mg daily as well as hydralazine 100 TID   - has a clonidine patch. Determine strength   - okay to start on Imdur  4) type II DM   - on insulin pump    Eulalia Giron DO  Nephrology  [facility] - Emergency Dept

## 2025-06-03 NOTE — PHARMACY MED REC
"Admission Medication History     The home medication history was taken by Chris Burrows.    You may go to "Admission" then "Reconcile Home Medications" tabs to review and/or act upon these items.     The home medication list has been updated by the Pharmacy department.   Please read ALL comments highlighted in yellow.   Please address this information as you see fit.    Feel free to contact us if you have any questions or require assistance.      The medications listed below were removed from the home medication list. Please reorder if appropriate:  Patient reports no longer taking the following medication(s):  CELLCEPT 250 MG CAP  HYDROCHLOROTHIAZIDE 25 MG TABLET      Medications listed below were obtained from: Patient/family and Analytic software- [employer]    Current Outpatient Medications on File Prior to Encounter   Medication Sig    acetaminophen (TYLENOL) 500 MG tablet Take 500 mg by mouth every 6 (six) hours as needed for Pain.        aspirin 81 mg Tab Take 81 mg by mouth once daily.      atorvastatin (LIPITOR) 80 MG tablet Take 1 tablet (80 mg total) by mouth once daily.        blood-glucose sensor (DEXCOM G7 SENSOR) Kayla Change sensor every 10 days.        cloNIDine 0.1 mg/24 hr td ptwk (CATAPRES) 0.1 mg/24 hr Place 1 patch onto the skin every 7 days.        doxazosin (CARDURA) 8 MG Tab Take 1 tablet (8 mg total) by mouth once daily.        empagliflozin (JARDIANCE) 10 mg tablet Take 1 tablet (10 mg total) by mouth once daily.        eplerenone (INSPRA) 50 MG Tab Take 1 tablet (50 mg total) by mouth once daily.        ergocalciferol (ERGOCALCIFEROL) 50,000 unit Cap Take 1 capsule (50,000 Units total) by mouth every 7 days.           famotidine (PEPCID AC ORAL) Take 1 tablet by mouth daily as needed (Acid reflux).      gabapentin (NEURONTIN) 300 MG capsule Take 1 capsule (300 mg total) by mouth 2 (two) times daily        hydrALAZINE (APRESOLINE) 100 MG tablet Take 1 tablet (100 mg total) by mouth every 8 " "(eight) hours.        insulin aspart U-100 (NOVOLOG U-100 INSULIN ASPART) 100 unit/mL injection Use in pump, max daily 100 units.          levoFLOXacin (LEVAQUIN) 750 MG tablet Take 1 tablet (750 mg total) by mouth every other day for 6 days.        magnesium oxide (MAG-OX) 400 mg (241.3 mg magnesium) tablet Take 1 tablet (400 mg total) by mouth 2 (two) times daily.          meclizine (ANTIVERT) 25 mg tablet Take 1 tablet (25 mg total) by mouth 3 (three) times daily as   needed for Dizziness.      polyethylene glycol (MIRALAX) 17 gram PwPk Take 17 g by mouth daily as needed for Constipation.        predniSONE (DELTASONE) 5 MG tablet Take 1 tablet (5 mg total) by mouth once daily.        rivaroxaban (XARELTO) 15 mg Tab Take 1 tablet (15 mg total) by mouth daily with dinner or evening   meal.      tacrolimus (PROGRAF) 1 MG Cap Take 1 capsule (1 mg total) by mouth every 12 (twelve) hours.        valsartan (DIOVAN) 320 MG tablet Take 1 tablet (320 mg total) by mouth once daily.        blood sugar diagnostic Strp Use to check blood glucose 1 times daily, to use with insurance preferred meter      blood-glucose meter Misc Use to check blood glucose 1 times daily, to use with insurance preferred meter      insulin pump cart,auto,BT,G6/7 (OMNIPOD 5 G6-G7 PODS, GEN 5,) Crtg Change every 48 hours.          lancets 30 gauge Misc Use to check blood glucose 1 times daily, to use with insurance preferred meter      pen needle, diabetic (BD ULTRA-FINE LO PEN NEEDLE) 32 gauge x 5/32" Ndle USE TO ADMINISTER INSULIN 4 TIMES DAILY.                   Chris Burrows  EXT [id number]                 .          "

## 2025-06-03 NOTE — SUBJECTIVE & OBJECTIVE
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    CKD (chronic kidney disease) stage 4, GFR 15-29 ml/min 07/29/2014    Colon polyps 2014    Depression - situational 07/29/2014    Diabetes type 2 since 2000 07/29/2014    Diabetic neuropathy 07/29/2014    Encounter for blood transfusion     History of cardioversion 10/03/2019    History of hepatitis C, s/p successful Rx w/ SVR24 - 9/2017 07/29/2014    Genotype 1a, treatment naive 10/2014 liver biopsy - grade 1 / stage 1 Completed Harvoni w/ SVR    Hyperlipidemia 07/29/2014    Hypertension     Neuropathy     Organ transplant candidate 07/29/2014    Pancreatitis     S/P cadaveric kidney transplant 11/5/2016. ESRD secondary to HTN/DMII 11/05/2016    Type 2 diabetes mellitus with stage 3a chronic kidney disease, with long-term current use of insulin 07/29/2014       Past Surgical History:   Procedure Laterality Date    ABLATION OF ARRHYTHMOGENIC FOCUS FOR ATRIAL FIBRILLATION N/A 6/11/2024    Procedure: Ablation atrial fibrillation;  Surgeon: Bronson Bowden MD;  Location: [facility] EP LAB;  Service: Cardiology;  Laterality: N/A;  AF, FELICIANO (cx if SR), PVI, RFA, Carto, Gen, GP, 3 Prep    ABLATION, ATRIAL FLUTTER, TYPICAL  6/11/2024    Procedure: Ablation, Atrial Flutter, Typical;  Surgeon: Bronson Bowden MD;  Location: [facility] EP LAB;  Service: Cardiology;;    APPENDECTOMY      BONE MARROW BIOPSY Left 6/26/2024    Procedure: Biopsy-bone marrow;  Surgeon: Bridger Zapata MD;  Location: [facility] ENDO ([address] [place] [address]);  Service: Oncology;  Laterality: Left;  6/24-pt knows to hold aspirin and eliquis as instructed by hem/onc, precall complete-Kpvt    CARDIOVERSION  6/11/2024    Procedure: Cardioversion;  Surgeon: Bronson Bowden MD;  Location: [facility] EP LAB;  Service: Cardiology;;    CATARACT EXTRACTION W/  INTRAOCULAR LENS IMPLANT  Right 3/13/2024    Procedure: EXTRACTION, CATARACT, WITH IOL INSERTION;  Surgeon: Jennifer Palacio MD;  Location: [facility] OR;  Service: Ophthalmology;  Laterality: Right;    CATARACT EXTRACTION W/  INTRAOCULAR LENS IMPLANT Left 4/10/2024    Procedure: EXTRACTION, CATARACT, WITH IOL INSERTION;  Surgeon: Jennifer Palacio MD;  Location: [facility] OR;  Service: Ophthalmology;  Laterality: Left;    COLONOSCOPY N/A 12/21/2020    Procedure: COLONOSCOPY;  Surgeon: Tico Bell MD;  Location: [facility] ENDO (4TH FLR);  Service: Endoscopy;  Laterality: N/A;  ok to hold eliquis per Dr. Valadez, see telephone encounter 11/13/2020-MS  covid test 12/18 [place]    ECHOCARDIOGRAM,TRANSESOPHAGEAL N/A 2/3/2025    Procedure: Transesophageal echo (FELICIANO) intra-procedure log documentation;  Surgeon: Grayson Lin III, MD;  Location: [facility] EP LAB;  Service: Cardiology;  Laterality: N/A;    KIDNEY TRANSPLANT      TRANSESOPHAGEAL ECHOCARDIOGRAPHY N/A 8/26/2019    Procedure: ECHOCARDIOGRAM, TRANSESOPHAGEAL;  Surgeon: [facility] Diagnostic Provider;  Location: [facility] EP LAB;  Service: Cardiology;  Laterality: N/A;    TRANSESOPHAGEAL ECHOCARDIOGRAPHY N/A 6/11/2024    Procedure: ECHOCARDIOGRAM, TRANSESOPHAGEAL;  Surgeon: Grayson Lin III, MD;  Location: [facility] EP LAB;  Service: Cardiology;  Laterality: N/A;    TREATMENT OF CARDIAC ARRHYTHMIA N/A 8/26/2019    Procedure: CARDIOVERSION;  Surgeon: Bronson Bowden MD;  Location: [facility] EP LAB;  Service: Cardiology;  Laterality: N/A;  AF, FELICIANO, DCCV, MAC, GP, 3 PREP    TREATMENT OF CARDIAC ARRHYTHMIA N/A 2/3/2025    Procedure: Cardioversion or Defibrillation;  Surgeon: Bronson Bowden MD;  Location: [facility] EP LAB;  Service: Cardiology;  Laterality: N/A;  AF, FELICIANO, DCCV, MAC, GP, 3 Prep       Review of patient's allergies indicates:   Allergen Reactions    Nifedipine Other (See Comments)     Gingival hyperplasia       Current Facility-Administered Medications on File Prior to Encounter   Medication    balanced salt  irrigation intra-ocular solution 1 drop    epoetin tanya injection 4,000 Units    phenylephrine HCL 10% ophthalmic solution 1 drop    proparacaine 0.5 % ophthalmic solution 1 drop    sodium chloride 0.9% flush 10 mL    TETRAcaine HCl (PF) 0.5 % Drop 1 drop     Current Outpatient Medications on File Prior to Encounter   Medication Sig    acetaminophen (TYLENOL) 500 MG tablet Take 500 mg by mouth every 6 (six) hours as needed for Pain.    aspirin 81 mg Tab Take 81 mg by mouth once daily.    atorvastatin (LIPITOR) 80 MG tablet Take 1 tablet (80 mg total) by mouth once daily.    blood-glucose sensor (DEXCOM G7 SENSOR) Kayla Change sensor every 10 days.    cloNIDine 0.1 mg/24 hr td ptwk (CATAPRES) 0.1 mg/24 hr Place 1 patch onto the skin every 7 days.    doxazosin (CARDURA) 8 MG Tab Take 1 tablet (8 mg total) by mouth once daily.    empagliflozin (JARDIANCE) 10 mg tablet Take 1 tablet (10 mg total) by mouth once daily.    [Paused] eplerenone (INSPRA) 50 MG Tab Take 1 tablet (50 mg total) by mouth once daily.    ergocalciferol (ERGOCALCIFEROL) 50,000 unit Cap Take 1 capsule (50,000 Units total) by mouth every 7 days. (Patient taking differently: Take 50,000 Units by mouth every 7 days. Fridays)    famotidine (PEPCID AC ORAL) Take 1 tablet by mouth daily as needed (Acid reflux).    gabapentin (NEURONTIN) 300 MG capsule Take 1 capsule (300 mg total) by mouth 2 (two) times daily. (Patient taking differently: Take 300 mg by mouth 3 (three) times daily.)    hydrALAZINE (APRESOLINE) 100 MG tablet Take 1 tablet (100 mg total) by mouth every 8 (eight) hours.    insulin aspart U-100 (NOVOLOG U-100 INSULIN ASPART) 100 unit/mL injection Use in pump, max daily 100 units.    levoFLOXacin (LEVAQUIN) 750 MG tablet Take 1 tablet (750 mg total) by mouth every other day. for 6 days    magnesium oxide (MAG-OX) 400 mg (241.3 mg magnesium) tablet Take 1 tablet (400 mg total) by mouth 2 (two) times daily.    meclizine (ANTIVERT) 25 mg tablet  "Take 1 tablet (25 mg total) by mouth 3 (three) times daily as needed for Dizziness.    polyethylene glycol (MIRALAX) 17 gram PwPk Take 17 g by mouth daily as needed for Constipation. Take 17 gm (mixed in liquid) by mouth every day.    predniSONE (DELTASONE) 5 MG tablet Take 1 tablet (5 mg total) by mouth once daily.    rivaroxaban (XARELTO) 15 mg Tab Take 1 tablet (15 mg total) by mouth daily with dinner or evening meal.    tacrolimus (PROGRAF) 1 MG Cap Take 1 capsule (1 mg total) by mouth every 12 (twelve) hours.    valsartan (DIOVAN) 320 MG tablet Take 1 tablet (320 mg total) by mouth once daily.    blood sugar diagnostic Strp Use to check blood glucose 1 times daily, to use with insurance preferred meter    blood-glucose meter Misc Use to check blood glucose 1 times daily, to use with insurance preferred meter    insulin pump cart,auto,BT,G6/7 (OMNIPOD 5 G6-G7 PODS, GEN 5,) Crtg Change every 48 hours.    lancets 30 gauge Misc Use to check blood glucose 1 times daily, to use with insurance preferred meter    pen needle, diabetic (BD ULTRA-FINE LO PEN NEEDLE) 32 gauge x 5/32" Ndle USE TO ADMINISTER INSULIN 4 TIMES DAILY.    [DISCONTINUED] hydroCHLOROthiazide (HYDRODIURIL) 25 MG tablet Take 1 tablet (25 mg total) by mouth once daily.    [DISCONTINUED] insulin lispro (HUMALOG KWIKPEN INSULIN) 100 unit/mL pen Inject 18 units w/ breakfast, 16 units w/ lunch and dinner plus scale 150-200 +2, 201-250 +4, 251-300 +6, 301-350 +8.    [DISCONTINUED] mycophenolate (CELLCEPT) 250 mg Cap Take 2 capsules (500 mg total) by mouth 2 (two) times daily.     Family History       Problem Relation (Age of Onset)    Cancer Brother    Diabetes Mother    Heart disease Mother, Father    Hypertension Mother, Brother          Tobacco Use    Smoking status: Former     Current packs/day: 0.00     Average packs/day: 0.5 packs/day for 32.0 years (16.0 ttl pk-yrs)     Types: Cigarettes     Start date: 11/28/1984     Quit date: 11/28/2016     " Years since quittin.5    Smokeless tobacco: Never   Substance and Sexual Activity    Alcohol use: Yes     Comment: Pt reports occasional beers on Sundays. Pt reports drinking daily prior to ESRD diagnosis.    Drug use: No    Sexual activity: Yes     Partners: Female     Review of Systems   HENT:  Negative for congestion.    Respiratory:  Positive for shortness of breath. Negative for cough.         Hemoptysis   Cardiovascular:  Positive for palpitations and leg swelling (mild). Negative for chest pain.   Gastrointestinal:  Negative for abdominal distention and abdominal pain.   Genitourinary:  Negative for dysuria.   Musculoskeletal:  Positive for back pain.   Neurological:  Negative for headaches.     Objective:     Vital Signs (Most Recent):  Temp: 97.8 °F (36.6 °C) (25 165)  Pulse: 65 (25 165)  Resp: 16 (25)  BP: (!) 205/95 (25)  SpO2: 99 % (25) Vital Signs (24h Range):  Temp:  [97.1 °F (36.2 °C)-97.9 °F (36.6 °C)] 97.8 °F (36.6 °C)  Pulse:  [] 65  Resp:  [16-20] 16  SpO2:  [95 %-99 %] 99 %  BP: (165-215)/() 205/95     Weight: 90.7 kg (199 lb 15.3 oz)  Body mass index is 26.38 kg/m².     Physical Exam  Constitutional:       General: He is not in acute distress.     Appearance: Normal appearance. He is not ill-appearing.   HENT:      Head: Normocephalic and atraumatic.      Nose: No congestion.   Eyes:      Extraocular Movements: Extraocular movements intact.      Conjunctiva/sclera: Conjunctivae normal.   Cardiovascular:      Rate and Rhythm: Normal rate. Rhythm irregular.      Pulses: Normal pulses.      Heart sounds: Murmur heard.   Pulmonary:      Effort: Pulmonary effort is normal. No respiratory distress.      Breath sounds: Examination of the right-lower field reveals rales. Examination of the left-lower field reveals rales. Rales (bilateral) present. No rhonchi (resolved).   Abdominal:      General: There is no distension.      Palpations:  Abdomen is soft.      Tenderness: There is no abdominal tenderness.   Musculoskeletal:      Right lower leg: Edema (minimal) present.      Left lower leg: Edema present.   Skin:     General: Skin is warm.      Coloration: Skin is not pale.   Neurological:      General: No focal deficit present.      Mental Status: He is alert and oriented to person, place, and time.   Psychiatric:         Mood and Affect: Mood normal.         Thought Content: Thought content normal.                Significant Labs: All pertinent labs within the past 24 hours have been reviewed.  CBC:   Recent Labs   Lab 06/03/25  0839   WBC 6.72   HGB 9.3*   HCT 32.0*        CMP:   Recent Labs   Lab 06/03/25  0839 06/03/25  1007    139   K 4.0 4.3    105   CO2 23 22*    115*   BUN 58* 58*   CREATININE 3.1* 3.1*   CALCIUM 8.9 8.9   PROT  --  6.7   ALBUMIN  --  3.3*   BILITOT  --  0.7   ALKPHOS  --  58   AST  --  14   ALT  --  <5*   ANIONGAP 11 12     Cardiac Markers:   Recent Labs   Lab 06/03/25  1007   BNP 4,062*       Significant Imaging: I have reviewed all pertinent imaging results/findings within the past 24 hours.

## 2025-06-03 NOTE — SUBJECTIVE & OBJECTIVE
Interval HPI:   Overnight events: Patient in room OBS08/EDOU08. Blood glucose stable. BG at goal on current insulin regimen (Home Insulin Pump). Steroid use- None.      Renal function-   Lab Results   Component Value Date    CREATININE 3.1 (H) 2025        Vasopressors-  None     Diet Low Sodium (2 gm) Fluid - 1500mL     Eatin%  Nausea: No  Hypoglycemia and intervention: No  Fever: No  TPN and/or TF: No    PMH, PSH, FH, SH updated and reviewed     ROS:  Review of Systems   Respiratory:  Positive for shortness of breath.    Cardiovascular:  Negative for chest pain.   Gastrointestinal:  Negative for constipation, diarrhea, nausea and vomiting.   Endocrine: Negative for polydipsia and polyuria.       Current Medications and/or Treatments Impacting Glycemic Control  Immunotherapy:    Immunosuppressants           Stop Route Frequency     tacrolimus capsule 1 mg         -- Oral 2 times daily          Steroids:   Hormones (From admission, onward)      Start     Stop Route Frequency Ordered    25 0900  predniSONE tablet 5 mg         -- Oral Daily 25 1421    25 1424  melatonin tablet 6 mg         -- Oral Nightly PRN 25 1326          Pressors:    Autonomic Drugs (From admission, onward)      None          Hyperglycemia/Diabetes Medications:   Antihyperglycemics (From admission, onward)      None             PHYSICAL EXAMINATION:  Vitals:    25 1430   BP: (!) 196/90   Pulse: 65   Resp: 20   Temp:      Body mass index is 26.38 kg/m².     Physical Exam  Constitutional:       General: He is not in acute distress.     Appearance: Normal appearance. He is not ill-appearing.   HENT:      Head: Normocephalic and atraumatic.      Right Ear: External ear normal.      Left Ear: External ear normal.      Nose: Nose normal.   Neurological:      Mental Status: He is alert.   Psychiatric:         Mood and Affect: Mood normal.         Behavior: Behavior normal.

## 2025-06-04 ENCOUNTER — DOCUMENTATION ONLY (OUTPATIENT)
Dept: CARDIOLOGY | Facility: CLINIC | Age: 70
End: 2025-06-04
Payer: MEDICARE

## 2025-06-04 DIAGNOSIS — R06.02 SOB (SHORTNESS OF BREATH): Primary | ICD-10-CM

## 2025-06-04 PROBLEM — Z79.60 LONG-TERM USE OF IMMUNOSUPPRESSANT MEDICATION: Status: ACTIVE | Noted: 2019-10-03

## 2025-06-04 LAB
ABSOLUTE EOSINOPHIL (OHS): 0.08 K/UL
ABSOLUTE MONOCYTE (OHS): 0.81 K/UL (ref 0.3–1)
ABSOLUTE NEUTROPHIL COUNT (OHS): 2.89 K/UL (ref 1.8–7.7)
ALBUMIN SERPL BCP-MCNC: 2.9 G/DL (ref 3.5–5.2)
ALP SERPL-CCNC: 51 UNIT/L (ref 40–150)
ALT SERPL W/O P-5'-P-CCNC: <5 UNIT/L (ref 10–44)
ANION GAP (OHS): 10 MMOL/L (ref 8–16)
AORTIC SIZE INDEX (SOV): 1.8 CM/M2
AORTIC SIZE INDEX: 1.7 CM/M2
ASCENDING AORTA: 3.6 CM
AST SERPL-CCNC: 13 UNIT/L (ref 11–45)
AV AREA BY CONTINUOUS VTI: 4.6 CM2
AV INDEX (PROSTH): 1.1
AV LVOT MEAN GRADIENT: 3 MMHG
AV LVOT PEAK GRADIENT: 4 MMHG
AV MEAN GRADIENT: 3 MMHG
AV PEAK GRADIENT: 5 MMHG
AV VALVE AREA BY VELOCITY RATIO: 3.8 CM²
AV VALVE AREA: 4.6 CM2
AV VELOCITY RATIO: 0.91
BASOPHILS # BLD AUTO: 0.04 K/UL
BASOPHILS NFR BLD AUTO: 0.8 %
BILIRUB SERPL-MCNC: 0.6 MG/DL (ref 0.1–1)
BSA FOR ECHO PROCEDURE: 2.16 M2
BUN SERPL-MCNC: 58 MG/DL (ref 8–23)
CALCIUM SERPL-MCNC: 8.5 MG/DL (ref 8.7–10.5)
CHLORIDE SERPL-SCNC: 104 MMOL/L (ref 95–110)
CO2 SERPL-SCNC: 22 MMOL/L (ref 23–29)
CREAT SERPL-MCNC: 3.1 MG/DL (ref 0.5–1.4)
CV ECHO LV RWT: 0.26 CM
DOP CALC AO PEAK VEL: 1.1 M/S
DOP CALC AO VTI: 13.8 CM
DOP CALC LVOT AREA: 4.2 CM2
DOP CALC LVOT DIAMETER: 2.3 CM
DOP CALC LVOT PEAK VEL: 1 M/S
DOP CALC LVOT STROKE VOLUME: 63.1 CM3
DOP CALCLVOT PEAK VEL VTI: 15.2 CM
E/E' RATIO: 12 M/S
ECHO EF ESTIMATED: 45 %
ECHO LV POSTERIOR WALL: 0.7 CM (ref 0.6–1.1)
EJECTION FRACTION: 60 %
ERYTHROCYTE [DISTWIDTH] IN BLOOD BY AUTOMATED COUNT: 19.2 % (ref 11.5–14.5)
FRACTIONAL SHORTENING: 26.4 % (ref 28–44)
GFR SERPLBLD CREATININE-BSD FMLA CKD-EPI: 21 ML/MIN/1.73/M2
GLUCOSE SERPL-MCNC: 91 MG/DL (ref 70–110)
HCT VFR BLD AUTO: 29.2 % (ref 40–54)
HGB BLD-MCNC: 8.7 GM/DL (ref 14–18)
IMM GRANULOCYTES # BLD AUTO: 0.02 K/UL (ref 0–0.04)
IMM GRANULOCYTES NFR BLD AUTO: 0.4 % (ref 0–0.5)
INTERVENTRICULAR SEPTUM: 1.5 CM (ref 0.6–1.1)
IVC DIAMETER: 2.33 CM
LA MAJOR: 7.4 CM
LA MINOR: 7.5 CM
LA WIDTH: 5 CM
LEFT ATRIUM SIZE: 4 CM
LEFT ATRIUM VOLUME INDEX MOD: 56 ML/M2
LEFT ATRIUM VOLUME INDEX: 59 ML/M2
LEFT ATRIUM VOLUME MOD: 121 ML
LEFT ATRIUM VOLUME: 127 CM3
LEFT INTERNAL DIMENSION IN SYSTOLE: 3.9 CM (ref 2.1–4)
LEFT VENTRICLE DIASTOLIC VOLUME INDEX: 62.33 ML/M2
LEFT VENTRICLE DIASTOLIC VOLUME: 134 ML
LEFT VENTRICLE MASS INDEX: 105.9 G/M2
LEFT VENTRICLE SYSTOLIC VOLUME INDEX: 34.4 ML/M2
LEFT VENTRICLE SYSTOLIC VOLUME: 74 ML
LEFT VENTRICULAR INTERNAL DIMENSION IN DIASTOLE: 5.3 CM (ref 3.5–6)
LEFT VENTRICULAR MASS: 227.7 G
LV LATERAL E/E' RATIO: 10.2 M/S
LV SEPTAL E/E' RATIO: 13.1 M/S
LYMPHOCYTES # BLD AUTO: 1.48 K/UL (ref 1–4.8)
MAGNESIUM SERPL-MCNC: 2.1 MG/DL (ref 1.6–2.6)
MCH RBC QN AUTO: 22.1 PG (ref 27–31)
MCHC RBC AUTO-ENTMCNC: 29.8 G/DL (ref 32–36)
MCV RBC AUTO: 74 FL (ref 82–98)
MV PEAK E VEL: 0.92 M/S
NUCLEATED RBC (/100WBC) (OHS): 0 /100 WBC
OHS CV RV/LV RATIO: 0.72 CM
PHOSPHATE SERPL-MCNC: 4 MG/DL (ref 2.7–4.5)
PISA TR MAX VEL: 2.3 M/S
PLATELET # BLD AUTO: 150 K/UL (ref 150–450)
PMV BLD AUTO: ABNORMAL FL
POCT GLUCOSE: 105 MG/DL (ref 70–110)
POCT GLUCOSE: 107 MG/DL (ref 70–110)
POCT GLUCOSE: 114 MG/DL (ref 70–110)
POCT GLUCOSE: 130 MG/DL (ref 70–110)
POCT GLUCOSE: 171 MG/DL (ref 70–110)
POTASSIUM SERPL-SCNC: 3.7 MMOL/L (ref 3.5–5.1)
PROT SERPL-MCNC: 6 GM/DL (ref 6–8.4)
RA MAJOR: 5.41 CM
RA PRESSURE ESTIMATED: 15 MMHG
RA WIDTH: 4.09 CM
RBC # BLD AUTO: 3.94 M/UL (ref 4.6–6.2)
RELATIVE EOSINOPHIL (OHS): 1.5 %
RELATIVE LYMPHOCYTE (OHS): 27.8 % (ref 18–48)
RELATIVE MONOCYTE (OHS): 15.2 % (ref 4–15)
RELATIVE NEUTROPHIL (OHS): 54.3 % (ref 38–73)
RIGHT ATRIAL AREA: 17.1 CM2
RIGHT VENTRICLE DIASTOLIC BASEL DIMENSION: 3.8 CM
RV TB RVSP: 17 MMHG
RV TISSUE DOPPLER FREE WALL SYSTOLIC VELOCITY 1 (APICAL 4 CHAMBER VIEW): 11.31 CM/S
SINUS: 3.88 CM
SODIUM SERPL-SCNC: 136 MMOL/L (ref 136–145)
STJ: 3.2 CM
TACROLIMUS BLD-MCNC: 6.6 NG/ML (ref 5–15)
TACROLIMUS BLD-MCNC: 7.6 NG/ML (ref 5–15)
TDI LATERAL: 0.09 M/S
TDI SEPTAL: 0.07 M/S
TDI: 0.08 M/S
TRICUSPID ANNULAR PLANE SYSTOLIC EXCURSION: 1.5 CM
TV PEAK GRADIENT: 20 MMHG
TV REST PULMONARY ARTERY PRESSURE: 36 MMHG
WBC # BLD AUTO: 5.32 K/UL (ref 3.9–12.7)
Z-SCORE OF LEFT VENTRICULAR DIMENSION IN END DIASTOLE: -2.75
Z-SCORE OF LEFT VENTRICULAR DIMENSION IN END SYSTOLE: -0.68

## 2025-06-04 PROCEDURE — 96375 TX/PRO/DX INJ NEW DRUG ADDON: CPT

## 2025-06-04 PROCEDURE — 25000003 PHARM REV CODE 250: Mod: HCNC

## 2025-06-04 PROCEDURE — 11000001 HC ACUTE MED/SURG PRIVATE ROOM: Mod: HCNC

## 2025-06-04 PROCEDURE — 84075 ASSAY ALKALINE PHOSPHATASE: CPT | Mod: HCNC

## 2025-06-04 PROCEDURE — 36415 COLL VENOUS BLD VENIPUNCTURE: CPT | Mod: HCNC | Performed by: PHYSICIAN ASSISTANT

## 2025-06-04 PROCEDURE — 83735 ASSAY OF MAGNESIUM: CPT | Mod: HCNC

## 2025-06-04 PROCEDURE — 63600175 PHARM REV CODE 636 W HCPCS: Mod: HCNC

## 2025-06-04 PROCEDURE — 99233 SBSQ HOSP IP/OBS HIGH 50: CPT | Mod: HCNC,,, | Performed by: PHYSICIAN ASSISTANT

## 2025-06-04 PROCEDURE — 84100 ASSAY OF PHOSPHORUS: CPT | Mod: HCNC

## 2025-06-04 PROCEDURE — 99232 SBSQ HOSP IP/OBS MODERATE 35: CPT | Mod: HCNC,,, | Performed by: PHYSICIAN ASSISTANT

## 2025-06-04 PROCEDURE — 80197 ASSAY OF TACROLIMUS: CPT | Mod: HCNC | Performed by: PHYSICIAN ASSISTANT

## 2025-06-04 PROCEDURE — 85025 COMPLETE CBC W/AUTO DIFF WBC: CPT | Mod: HCNC

## 2025-06-04 PROCEDURE — 36415 COLL VENOUS BLD VENIPUNCTURE: CPT | Mod: HCNC

## 2025-06-04 RX ORDER — MYCOPHENOLATE MOFETIL 250 MG/1
500 CAPSULE ORAL 2 TIMES DAILY
COMMUNITY

## 2025-06-04 RX ORDER — FUROSEMIDE 10 MG/ML
80 INJECTION INTRAMUSCULAR; INTRAVENOUS DAILY
Status: DISCONTINUED | OUTPATIENT
Start: 2025-06-04 | End: 2025-06-04

## 2025-06-04 RX ORDER — CLONIDINE 0.1 MG/24H
1 PATCH, EXTENDED RELEASE TRANSDERMAL
Status: DISCONTINUED | OUTPATIENT
Start: 2025-06-10 | End: 2025-06-06

## 2025-06-04 RX ORDER — FUROSEMIDE 10 MG/ML
80 INJECTION INTRAMUSCULAR; INTRAVENOUS DAILY
Status: COMPLETED | OUTPATIENT
Start: 2025-06-04 | End: 2025-06-04

## 2025-06-04 RX ORDER — DOXAZOSIN 8 MG/1
8 TABLET ORAL DAILY
Status: DISCONTINUED | OUTPATIENT
Start: 2025-06-04 | End: 2025-06-07 | Stop reason: HOSPADM

## 2025-06-04 RX ORDER — SACUBITRIL AND VALSARTAN 49; 51 MG/1; MG/1
1 TABLET, FILM COATED ORAL 2 TIMES DAILY
Qty: 60 TABLET | Refills: 5 | Status: SHIPPED | OUTPATIENT
Start: 2025-06-04

## 2025-06-04 RX ORDER — TORSEMIDE 20 MG/1
80 TABLET ORAL DAILY
Status: DISCONTINUED | OUTPATIENT
Start: 2025-06-05 | End: 2025-06-05

## 2025-06-04 RX ADMIN — FUROSEMIDE 80 MG: 10 INJECTION, SOLUTION INTRAVENOUS at 06:06

## 2025-06-04 RX ADMIN — GABAPENTIN 300 MG: 300 CAPSULE ORAL at 08:06

## 2025-06-04 RX ADMIN — FUROSEMIDE 80 MG: 10 INJECTION, SOLUTION INTRAVENOUS at 08:06

## 2025-06-04 RX ADMIN — GABAPENTIN 300 MG: 300 CAPSULE ORAL at 09:06

## 2025-06-04 RX ADMIN — TACROLIMUS 1 MG: 1 CAPSULE ORAL at 05:06

## 2025-06-04 RX ADMIN — HYDRALAZINE HYDROCHLORIDE 100 MG: 50 TABLET ORAL at 02:06

## 2025-06-04 RX ADMIN — PREDNISONE 5 MG: 5 TABLET ORAL at 08:06

## 2025-06-04 RX ADMIN — DOXAZOSIN 8 MG: 8 TABLET ORAL at 08:06

## 2025-06-04 RX ADMIN — RIVAROXABAN 15 MG: 15 TABLET, FILM COATED ORAL at 05:06

## 2025-06-04 RX ADMIN — HYDRALAZINE HYDROCHLORIDE 100 MG: 50 TABLET ORAL at 06:06

## 2025-06-04 RX ADMIN — ATORVASTATIN CALCIUM 80 MG: 40 TABLET, FILM COATED ORAL at 08:06

## 2025-06-04 RX ADMIN — VALSARTAN 320 MG: 160 TABLET, FILM COATED ORAL at 08:06

## 2025-06-04 RX ADMIN — POTASSIUM BICARBONATE 30 MEQ: 391 TABLET, EFFERVESCENT ORAL at 08:06

## 2025-06-04 RX ADMIN — HYDRALAZINE HYDROCHLORIDE 100 MG: 50 TABLET ORAL at 09:06

## 2025-06-04 RX ADMIN — TACROLIMUS 1 MG: 1 CAPSULE ORAL at 08:06

## 2025-06-04 NOTE — CONSULTS
Food & Nutrition Education    Diet Education: Fluid and Salt restriction  Time Spent: 10 minutes  Learners: Patient    Handouts provided: Low Sodium Nutrition Therapy, Fluid-Restricted Nutrition Therapy    Comments: Discussed importance of a low sodium diet. Reviewed high sodium foods that should be avoided. Food labels, salt free seasonings, and recommended sodium intake reviewed. Encouraged healthy, fresh foods that are low in sodium that are good for consumption. Fluid intake and conversions discussed. Foods considered fluids were reviewed and encouraged to monitor. General healthy diet encouraged. All questions/concerns were addressed and RD contact provided.    Follow-Up: Yes  Please Re-consult as needed.    Thanks!

## 2025-06-04 NOTE — SUBJECTIVE & OBJECTIVE
Subjective:   History of Present Illness:  69 yr old well known to our service. ESRD secondary to DM s/p a  donor kidney transplant on 2016, AFIB on Xarelto, CHF (EF of 40-45% with mild pHTN), hx of CVA who was recently discharged on 25 after another hospitalization for SOB, cough concerning for recurrent PNA. Ruled out for TB during that admission with patient d/c on Levaquin with outpatient follow up with pulm for possible bronch if symptoms persisted.      Patient states that after discharge he felt recurrence of similar symptoms- noted to have SOB, DOSS and PND where he was not able to lie flat at night to sleep. Per wife, he was able to sleep some in reclined position. Has been taking Lasix 80 mg by mouth when he has swelling of his lower extremity. Patient otherwise denies any fever, HA, sore throat. Does admit to a nonproductive cough. Denies any N/V/diarrhea.      On admission, patient noted to be hypertensive to 200's with rate controlled AFIB. O2 sat of 95-97% on RA. CXR concerning for pulmonary edema with BNP elevated to 4,000. Patient given lasix 80 mg IV.     Mr. Zamorano is a 69 y.o. year old male who is status post Kidney Transplant - 2016  (#1).    His maintenance immunosuppression consists of:   Immunosuppressants (From admission, onward)      Start     Stop Route Frequency Ordered    25 1800  tacrolimus capsule 1 mg         -- Oral 2 times daily 25 1421            Interval history: No acute events overnight. Pt feeling well this am. Reports SOB has improved. Plan to increase lasix today for further diuresis. Recommend repeat TTE. Pt w/ PHUONG, Cr 3.1. Cont tacro  and pred 5. Cont holding cellcept. VSS. Monitor.     Past Medical, Surgical, Family, and Social History:   Unchanged from H&P.    Scheduled Meds:   atorvastatin  80 mg Oral Daily    [START ON 6/10/2025] cloNIDine 0.1 mg/24 hr td ptwk  1 patch Transdermal Q7 Days    doxazosin  8 mg Oral Daily     furosemide (LASIX) injection  80 mg Intravenous Daily    gabapentin  300 mg Oral BID    hydrALAZINE  100 mg Oral Q8H    predniSONE  5 mg Oral Daily    rivaroxaban  15 mg Oral Daily with dinner    tacrolimus  1 mg Oral BID    valsartan  320 mg Oral Daily     Continuous Infusions:   insulin aspart U-100  0.2-1 Units/hr Subcutaneous Continuous         PRN Meds:  Current Facility-Administered Medications:     dextrose 50%, 12.5 g, Intravenous, PRN    dextrose 50%, 25 g, Intravenous, PRN    glucagon (human recombinant), 1 mg, Intramuscular, PRN    glucose, 16 g, Oral, PRN    glucose, 24 g, Oral, PRN    hydrALAZINE, 10 mg, Intravenous, Q6H PRN    insulin aspart U-100, 0-10 Units, Subcutaneous, PRN    melatonin, 6 mg, Oral, Nightly PRN    naloxone, 0.02 mg, Intravenous, PRN    ondansetron, 8 mg, Oral, Q8H PRN    polyethylene glycol, 17 g, Oral, BID PRN    sodium chloride 0.9%, 10 mL, Intravenous, Q12H PRN    sodium chloride 0.9%, 10 mL, Intravenous, PRN    Intake/Output - Last 3 Shifts         06/02 0700  06/03 0659 06/03 0700  06/04 0659 06/04 0700  06/05 0659    Urine (mL/kg/hr)  3000 350 (0.6)    Stool   0    Total Output  3000 350    Net  -3000 -350           Unmeasured Urine Occurrence  3 x     Unmeasured Stool Occurrence   0 x             Review of Systems   Constitutional:  Negative for chills and fever.   Respiratory:  Positive for cough and shortness of breath (improved).    Cardiovascular:  Positive for leg swelling. Negative for chest pain.   Gastrointestinal:  Negative for constipation, diarrhea, nausea and vomiting.   Endocrine: Negative for polydipsia and polyuria.   Genitourinary:  Negative for decreased urine volume and difficulty urinating.   Allergic/Immunologic: Positive for immunocompromised state.   Psychiatric/Behavioral:  Negative for behavioral problems and confusion.       Objective:     Vital Signs (Most Recent):  Temp: 97.6 °F (36.4 °C) (06/04/25 1101)  Pulse: 98 (06/04/25 1157)  Resp: 20  "(06/04/25 1101)  BP: (!) 172/85 (06/04/25 1101)  SpO2: 98 % (06/04/25 1101) Vital Signs (24h Range):  Temp:  [97.6 °F (36.4 °C)-98 °F (36.7 °C)] 97.6 °F (36.4 °C)  Pulse:  [] 98  Resp:  [13-20] 20  SpO2:  [92 %-99 %] 98 %  BP: (134-206)/(74-96) 172/85     Weight: 90.7 kg (199 lb 15.3 oz)  Height: 6' 1" (185.4 cm)  Body mass index is 26.38 kg/m².     Physical Exam  Vitals and nursing note reviewed.   Cardiovascular:      Rate and Rhythm: Normal rate.      Pulses: Normal pulses.   Pulmonary:      Effort: Pulmonary effort is normal.   Abdominal:      General: Bowel sounds are normal.      Palpations: Abdomen is soft.      Tenderness: There is no abdominal tenderness.   Musculoskeletal:      Right lower leg: Edema present.      Left lower leg: Edema present.   Skin:     General: Skin is warm and dry.   Neurological:      Mental Status: He is alert and oriented to person, place, and time. Mental status is at baseline.   Psychiatric:         Mood and Affect: Mood normal.         Behavior: Behavior normal.         Thought Content: Thought content normal.         Judgment: Judgment normal.          Laboratory:  CBC:   Recent Labs   Lab 06/03/25  0839 06/04/25  0425   WBC 6.72 5.32   RBC 4.11* 3.94*   HGB 9.3* 8.7*   HCT 32.0* 29.2*    150   MCV 78* 74*   MCH 22.6* 22.1*   MCHC 29.1* 29.8*     CMP:   Recent Labs   Lab 06/03/25  0839 06/03/25  1007 06/04/25  0425    115* 91   CALCIUM 8.9 8.9 8.5*   ALBUMIN  --  3.3* 2.9*   PROT  --  6.7 6.0    139 136   K 4.0 4.3 3.7   CO2 23 22* 22*    105 104   BUN 58* 58* 58*   CREATININE 3.1* 3.1* 3.1*   ALKPHOS  --  58 51   ALT  --  <5* <5*   AST  --  14 13     Labs within the past 24 hours have been reviewed.    Diagnostic Results:  US - Kidney: Results for orders placed during the hospital encounter of 04/29/25    US Transplant Kidney With Doppler    Narrative  EXAMINATION:  US TRANSPLANT KIDNEY WITH DOPPLER    CLINICAL HISTORY:  Patient with kidney " transplant presenting with PHUONG;    TECHNIQUE:  Real time gray scale and doppler ultrasound was performed over the patient's renal allograft.    COMPARISON:  Multiple prior exams, most recent from 12/04/2024.    FINDINGS:  Renal allograft in the right lower quadrant.  The allograft measures 13.3 cm. Normal perfusion. No hydronephrosis.    No fluid collections.    Vasculature:    Resistive indices ranged from 0.72 to 0.84.    Main renal artery peak systolic velocity: 188cm/sec with normal waveform.    Renal artery/iliac ratio: 1.1.    The main renal vein is patent.    Impression  Mildly elevated intraparenchymal resistive indices which may be seen with medical renal disease, rejection, or drug toxicity.      Electronically signed by: Mateo Fields MD  Date:    05/07/2025  Time:    11:40

## 2025-06-04 NOTE — PLAN OF CARE
69M DM2, HTN resulting in ESRD s/p kidney txp 2016 with current CKDIV, AF/AFL persistent on A/C, HFmrEF with pulmonary HTN, CVA, recent admission for PNA here for evaluation of symptoms typical of acute decompensated HF - orthopnea, DOSS, worsening edema. VS notable for significant, sustained HTN to 200s systolic, patient not requiring O2, and physical exam with e/o JVD and crackles b/l. Renal function is at baseline with Cr 3.1 and there is no e/o leucocytosis on CBC. BNP is ~4K with CXR showing pulmonary edema. Etiology of acute decompensated HF likely 2/2 hypertensive emergency. Other considerations would be arrhythmia (though AF/AFL appears rate controlled), coronary ischemia (though patient without CP), recent infection, dietary/medication non-adherence. Patient also was recently taken off two antiHTNsives of HCTZ and eplerenone. KTM is consulted and will ask for assistance in management of diuresis and elevated BP, as well as IS management. Will maintain tele to determine of any underlying arrhythmia. Appreciate endocrinology input re: patient's use of insulin pump.

## 2025-06-04 NOTE — ASSESSMENT & PLAN NOTE
Patient has Combined Systolic and Diastolic heart failure that is Acute on chronic. On presentation their CHF was decompensated. Evidence of decompensated CHF on presentation includes: edema, crackles on lung auscultation, orthopnea, and shortness of breath. The etiology of their decompensation is likely the stoppage of some of her BP and HF medications.  Most recent BNP and echo results are listed below.  Recent Labs     06/03/25  1007   BNP 4,062*     Latest ECHO  Results for orders placed during the hospital encounter of 05/22/25    Echo    Interpretation Summary    Left Ventricle: The left ventricle is normal in size. Moderately increased ventricular mass. Increased wall thickness. Mild septal thickening. There is moderate concentric hypertrophy. Mild global hypokinesis and regional wall motion abnormalities present. See diagram for wall motion findings. There is mildly reduced systolic function with a visually estimated ejection fraction of 45 - 50%. Ejection fraction is approximately 48%. Quantitated ejection fraction is 45%. There is indeterminate diastolic function.    Right Ventricle: The right ventricle is normal in size Wall thickness is normal. Systolic function is normal.    Left Atrium: The left atrium is severely dilated    Aortic Valve: There is mild aortic valve sclerosis. There is mild to mild-moderate aortic regurgitation.    Mitral Valve: There is mild regurgitation.    Tricuspid Valve: There is mild regurgitation.    Pulmonary Artery: There is mild pulmonary hypertension. The estimated pulmonary artery systolic pressure is 46 mmHg.    IVC/SVC: Elevated venous pressure at 15 mmHg.    Current Heart Failure Medications  valsartan tablet 320 mg, Daily, Oral  hydrALAZINE tablet 100 mg, Every 8 hours, Oral  hydrALAZINE injection 10 mg, Every 6 hours PRN, Intravenous  furosemide injection 80 mg, Daily, Intravenous  , 2 times daily, Oral    Plan  - Monitor strict I&Os and daily weights.    - Place on  telemetry  - Low sodium diet  - Place on fluid restriction of 1.5 L.   - Cardiology has not been consulted  - The patient's volume status is worsening as indicated by edema, crackles on lung auscultation, weight gain, orthopnea, and shortness of breath. Will continue current treatment

## 2025-06-04 NOTE — PLAN OF CARE
Patient alert and oriented X4. Resp even and unlabored. Skin warm and dry to touch. Patient ambulatory on RA. No c/o pain at this time. Bed in low position with call light in easy reach.  Problem: Adult Inpatient Plan of Care  Goal: Plan of Care Review  Outcome: Progressing  Goal: Patient-Specific Goal (Individualized)  Outcome: Progressing  Goal: Absence of Hospital-Acquired Illness or Injury  Outcome: Progressing  Goal: Optimal Comfort and Wellbeing  Outcome: Progressing  Goal: Readiness for Transition of Care  Outcome: Progressing     Problem: Acute Kidney Injury/Impairment  Goal: Fluid and Electrolyte Balance  Outcome: Progressing  Goal: Improved Oral Intake  Outcome: Progressing  Goal: Effective Renal Function  Outcome: Progressing     Problem: Pneumonia  Goal: Fluid Balance  Outcome: Progressing  Goal: Resolution of Infection Signs and Symptoms  Outcome: Progressing  Goal: Effective Oxygenation and Ventilation  Outcome: Progressing     Problem: Diabetes Comorbidity  Goal: Blood Glucose Level Within Targeted Range  Outcome: Progressing

## 2025-06-04 NOTE — HPI
69 yr old well known to our service. ESRD secondary to DM s/p a  donor kidney transplant on 2016, AFIB on Xarelto, CHF (EF of 40-45% with mild pHTN), hx of CVA who was recently discharged on 25 after another hospitalization for SOB, cough concerning for recurrent PNA. Ruled out for TB during that admission with patient d/c on Levaquin with outpatient follow up with pulm for possible bronch if symptoms persisted.      Patient states that after discharge he felt recurrence of similar symptoms- noted to have SOB, DOSS and PND where he was not able to lie flat at night to sleep. Per wife, he was able to sleep some in reclined position. Has been taking Lasix 80 mg by mouth when he has swelling of his lower extremity. Patient otherwise denies any fever, HA, sore throat. Does admit to a nonproductive cough. Denies any N/V/diarrhea.      On admission, patient noted to be hypertensive to 200's with rate controlled AFIB. O2 sat of 95-97% on RA. CXR concerning for pulmonary edema with BNP elevated to 4,000. Patient given lasix 80 mg IV.

## 2025-06-04 NOTE — ASSESSMENT & PLAN NOTE
Anemia is likely due to chronic disease due to Chronic Kidney Disease. Most recent hemoglobin and hematocrit are listed below.  Recent Labs     06/03/25  0839 06/04/25  0425   HGB 9.3* 8.7*   HCT 32.0* 29.2*     Plan  - Monitor serial CBC: Daily  - Transfuse PRBC if patient becomes hemodynamically unstable, symptomatic or H/H drops below 7/21.  - Patient has not received any PRBC transfusions to date  - Patient's anemia is currently stable

## 2025-06-04 NOTE — PROGRESS NOTES
"[facility]  Endocrinology  Progress Note    Admit Date: 6/3/2025     Reason for Consult: Management of T2DM, Hyperglycemia      Diabetes diagnosis year:       Home Diabetes Medications:    OMNIPOD 5  0.6 u/hr mn-0600 , 9p-mn, 0.7 u/hr 6a-9p   Target 120-130 mg/dl   Iob 3 hours  Max bolus 20 units   Max basal 2 u/hr   Isf 40 mn-mn  ICR 1 to 7.5      Presets:  Snack: 4 units (28g)  Small Meal: 8 units (56g)  Medium Meal: 12 units (90g)  Large Meal: 15 units (114g)  Super Large Meal: 18 units (130g)     How often checking glucose at home? >4 x day   BG readings on regimen: 150s  Hypoglycemia on the regimen?  No  Missed doses on regimen?  No     Diabetes Complications include:     Hyperglycemia     Complicating diabetes co morbidities:   S/p Kidney tx 2016         HPI: his is a 69-year-old male with a history of CVA, CKD status post transplant in 2016, diabetes, hypertension, atrial fibrillation presenting to the ED with shortness of breath.  Patient reports that he has had shortness of breath over the last couple of days, since his most recent discharge for pneumonia.  Reports that it has been worsening and is having trouble sleeping because when he does, states he feels like he is going to die. Endocrine consulted for BG management.     Interval HPI:   No acute events overnight. Patient in room 72660/14258 A. Blood glucose stable. BG at goal on current insulin regimen (SSI ). Steroid use- None .      Renal function- Abnormal -     Vasopressors-  None     Diet Low Sodium (2 gm) Fluid - 1500mL     Eatin%  Nausea: No  Hypoglycemia and intervention: No  Fever: No  TPN and/or TF: No     BP (!) 206/95 (BP Location: Right arm, Patient Position: Lying)   Pulse 66   Temp 97.8 °F (36.6 °C) (Oral)   Resp 13   Ht 6' 1" (1.854 m)   Wt 90.7 kg (199 lb 15.3 oz)   SpO2 (!) 92%   BMI 26.38 kg/m²     Labs Reviewed and Include    Recent Labs   Lab 25  0425   GLU 91   CALCIUM 8.5*   ALBUMIN " "2.9*   PROT 6.0      K 3.7   CO2 22*      BUN 58*   CREATININE 3.1*   ALKPHOS 51   ALT <5*   AST 13   BILITOT 0.6     Lab Results   Component Value Date    WBC 5.32 06/04/2025    HGB 8.7 (L) 06/04/2025    HCT 29.2 (L) 06/04/2025    MCV 74 (L) 06/04/2025     06/04/2025     No results for input(s): "TSH", "FREET4" in the last 168 hours.  Lab Results   Component Value Date    HGBA1C 6.4 (H) 06/03/2025       Nutritional status:   Body mass index is 26.38 kg/m².  Lab Results   Component Value Date    ALBUMIN 2.9 (L) 06/04/2025    ALBUMIN 3.3 (L) 06/03/2025    ALBUMIN 2.6 (L) 05/25/2025     No results found for: "PREALBUMIN"    Estimated Creatinine Clearance: 26.1 mL/min (A) (based on SCr of 3.1 mg/dL (H)).    Accu-Checks  Recent Labs     06/04/25  0634 06/04/25  0731   POCTGLUCOSE 105 107       Current Medications and/or Treatments Impacting Glycemic Control  Immunotherapy:    Immunosuppressants           Stop Route Frequency     tacrolimus capsule 1 mg         -- Oral 2 times daily          Steroids:   Hormones (From admission, onward)      Start     Stop Route Frequency Ordered    06/04/25 0900  predniSONE tablet 5 mg         -- Oral Daily 06/03/25 1421    06/03/25 1424  melatonin tablet 6 mg         -- Oral Nightly PRN 06/03/25 1326          Pressors:    Autonomic Drugs (From admission, onward)      None          Hyperglycemia/Diabetes Medications:   Antihyperglycemics (From admission, onward)      Start     Stop Route Frequency Ordered    06/03/25 1745  insulin aspart U-100 insulin pump from home        Question Answer Comment   Target number 110    Basal Rate #1 0.6    Basal rate #1 time 7027-7339        -- SubQ Continuous 06/03/25 1632    06/03/25 1730  insulin aspart U-100 insulin pump from home 0-10 Units        Question Answer Comment   Target number 110    Carbohydrate coverage #1 1:7.5    Carbohydrate coverage #1 time 8799-7889    Sensitivity #1 1:40    Sensitivity #1 time 5631-1806        " -- SubQ As needed (PRN) 06/03/25 1632            ASSESSMENT and PLAN    Renal/  PHUONG (acute kidney injury)  Titrate insulin slowly to avoid hypoglycemia as the risk of hypoglycemia increases with decreased creatinine clearance.        Endocrine  Insulin pump in place  At time of evaluation, pt meets criteria to continue home insulin pump usage.  - Has all adequate supplies   - Insulin pump site change on 6/3/25.    - Bolus settings reviewed    - No changes to home regimen.   - Nurse to check BG qac/hs/0200 & record in epic   - Patient to input glucose into pump and use bolus wizard for prandial needs   - Will continue to monitor accuchecks and titrate insulin as clinically indicated .     - Discussed above plan with patient, patient verbalized understanding.   - Understands in case of pump malfunction or cognitive decline in which pt can no longer safely use insulin pump, will transition to SC MDI        If pump malfunctions or is disconnected, please call endocrine.         Type 2 diabetes mellitus with stage 4 chronic kidney disease, with long-term current use of insulin  BG goal: 140-180    - Continue Home insulin pump   - POCT Glucose before meals, at bedtime and at 2 am  - Hypoglycemia protocol in place      ** Please notify Endocrine for any change and/or advance in diet**  ** Please call Endocrine for any BG related issues **     Discharge Planning:   TBD. Please notify endocrinology prior to discharge.            Chris Payan PA-C  Endocrinology  [facility]

## 2025-06-04 NOTE — ASSESSMENT & PLAN NOTE
Baseline creatinine is 2.6-2.8. Most recent creatinine and eGFR are listed below.  Recent Labs     06/03/25  0839 06/03/25  1007 06/04/25  0425   CREATININE 3.1* 3.1* 3.1*   EGFRNORACEVR 21* 21* 21*      Plan  - PHUONG is worsening. Will continue current treatment  - Avoid nephrotoxins and renally dose meds for GFR listed above  - Monitor urine output, serial BMP, and adjust therapy as needed

## 2025-06-04 NOTE — ASSESSMENT & PLAN NOTE
Anemia is likely due to chronic disease due to Chronic Kidney Disease. Most recent hemoglobin and hematocrit are listed below.  Recent Labs     06/03/25  0839   HGB 9.3*   HCT 32.0*     Plan  - Monitor serial CBC: Daily  - Transfuse PRBC if patient becomes hemodynamically unstable, symptomatic or H/H drops below 7/21.  - Patient has not received any PRBC transfusions to date  - Patient's anemia is currently stable

## 2025-06-04 NOTE — ASSESSMENT & PLAN NOTE
Patient has a current diagnosis of Hypertensive emergency with end organ damage evidenced by acute heart failure which is uncontrolled.  Latest blood pressure and vitals reviewed-   Temp:  [97.6 °F (36.4 °C)-98 °F (36.7 °C)]   Pulse:  []   Resp:  [13-20]   BP: (134-206)/(74-96)   SpO2:  [92 %-99 %] .   Patient currently off IV antihypertensives.   Home meds for hypertension were reviewed and noted below.   Hypertension Medications              cloNIDine 0.1 mg/24 hr td ptwk (CATAPRES) 0.1 mg/24 hr Place 1 patch onto the skin every 7 days.    doxazosin (CARDURA) 8 MG Tab Take 1 tablet (8 mg total) by mouth once daily.    eplerenone (INSPRA) 50 MG Tab ([Paused] since 5/25/2025  1:35 PM) Take 1 tablet (50 mg total) by mouth once daily.    hydrALAZINE (APRESOLINE) 100 MG tablet Take 1 tablet (100 mg total) by mouth every 8 (eight) hours.    valsartan (DIOVAN) 320 MG tablet Take 1 tablet (320 mg total) by mouth once daily.          Presenting SBP >200 with signs of end-organ dysfunction (CHF).  Restarted home antihypertensives including hydralazine 100 mg TID, valsartan, doxazosin, clonidine patch.  Monitor BP closely (q1hr while on IV agents if initiated).  Avoid overly rapid BP lowering; goal is gradual reduction (by ~25% in first 24 hrs).     Will aim for controlled BP reduction by medications noted above. Monitor and mitigate end organ damage as indicated.   WDL

## 2025-06-04 NOTE — PROGRESS NOTES
[facility] Medicine  Progress Note    Patient Name: Ravi Zamorano  MRN: [id number]  Patient Class: IP- Inpatient   Admission Date: 6/3/2025  Length of Stay: 0 days  Attending Physician: Devin Dubois MD  Primary Care Provider: Mima Mack MD    Subjective     Principal Problem:Acute exacerbation of congestive heart failure        HPI:  69-year-old male with a complex history including CKD s/p kidney transplant (2016, on tacrolimus), atrial fibrillation (on rivaroxaban), hypertension, hyperlipidemia, heart failure with preserved EF, and prior CVA, presents with progressively worsening shortness of breath for the past several days. He endorses orthopnea but denies paroxysmal nocturnal dyspnea, chest pain, or new palpitations. He denies recent dietary changes or non-compliance with medications. He was recently discharged following treatment for pneumonia. In the ED, he was afebrile but severely hypertensive (SBP >200 mmHg). Workup showed BNP >4000 and a troponin peak of 106. CXR revealed cardiomegaly and moderate pulmonary edema, concerning for acute on chronic CHF exacerbation. Diuresis with IV furosemide was initiated, and antihypertensives (hydralazine) restarted.    Overview/Hospital Course:  Admitted for new onset shortness of breath, started on high dose Diuretics and urine output incrreasd but BP renmainaed elevated despite the use of home Antiypertensives. KTM consulted for post kidney transplant. Considerations on addition  69M with ESRD s/p kidney transplant (2016), AF/AFL on anticoagulation, and HFmrEF presented with orthopnea, DOSS, and edema--found to have hypertensive emergency (SBP >200) with pulmonary edema on CXR and BNP ~4000. Renal function stable (Cr 3.1), no leukocytosis. Likely acute decompensated HF due to uncontrolled BP; arrhythmia, ischemia, infection, and medication changes also considered. Notably, HCTZ and eplerenone had been recently  discontinued. Started on IV diuretics with good response, though BP remained elevated. KTM consulted for post-transplant management; additional antihypertensives being considered. Telemetry continued; endocrinology looped in regarding insulin pump management. of other BP medications explored.    Interval Update: Diuresis ongoing with signifcant symptomatic improvement per CHF exacerbation,but patient's BP remained relatively high overall. KTM recommends IMDUR. Restarted Doxazosin.    Review of Systems   Constitutional:  Negative for chills and fever.   Respiratory:  Positive for cough and shortness of breath (improved).    Cardiovascular:  Positive for leg swelling. Negative for chest pain.   Gastrointestinal:  Negative for constipation, diarrhea, nausea and vomiting.   Endocrine: Negative for polydipsia and polyuria.   Genitourinary:  Negative for decreased urine volume and difficulty urinating.   Allergic/Immunologic: Positive for immunocompromised state.   Psychiatric/Behavioral:  Negative for behavioral problems and confusion.      Objective:     Vital Signs (Most Recent):  Temp: 97.6 °F (36.4 °C) (06/04/25 1101)  Pulse: 98 (06/04/25 1157)  Resp: 20 (06/04/25 1101)  BP: (!) 172/85 (06/04/25 1101)  SpO2: 98 % (06/04/25 1101) Vital Signs (24h Range):  Temp:  [97.6 °F (36.4 °C)-98 °F (36.7 °C)] 97.6 °F (36.4 °C)  Pulse:  [] 98  Resp:  [13-20] 20  SpO2:  [92 %-99 %] 98 %  BP: (134-206)/(74-96) 172/85     Weight: 90.7 kg (199 lb 15.3 oz)  Body mass index is 26.38 kg/m².    Intake/Output Summary (Last 24 hours) at 6/4/2025 1245  Last data filed at 6/4/2025 0830  Gross per 24 hour   Intake --   Output 3350 ml   Net -3350 ml         Physical Exam  Constitutional:       General: He is not in acute distress.     Appearance: Normal appearance. He is not ill-appearing.   HENT:      Head: Normocephalic and atraumatic.      Nose: No congestion.   Eyes:      Extraocular Movements: Extraocular movements intact.       Conjunctiva/sclera: Conjunctivae normal.   Cardiovascular:      Rate and Rhythm: Normal rate. Rhythm irregular.      Pulses: Normal pulses.      Heart sounds: Murmur heard.   Pulmonary:      Effort: Pulmonary effort is normal. No respiratory distress.      Breath sounds: Examination of the right-lower field reveals rales. Examination of the left-lower field reveals rales. Rales (bilateral) present. No rhonchi (resolved).   Abdominal:      General: There is no distension.      Palpations: Abdomen is soft.      Tenderness: There is no abdominal tenderness.   Musculoskeletal:      Right lower leg: Edema (minimal) present.      Left lower leg: Edema present.   Skin:     General: Skin is warm.      Coloration: Skin is not pale.   Neurological:      General: No focal deficit present.      Mental Status: He is alert and oriented to person, place, and time.   Psychiatric:         Mood and Affect: Mood normal.         Thought Content: Thought content normal.               Significant Labs: All pertinent labs within the past 24 hours have been reviewed.  CBC:   Recent Labs   Lab 06/03/25  0839 06/04/25  0425   WBC 6.72 5.32   HGB 9.3* 8.7*   HCT 32.0* 29.2*    150     CMP:   Recent Labs   Lab 06/03/25  0839 06/03/25  1007 06/04/25  0425    139 136   K 4.0 4.3 3.7    105 104   CO2 23 22* 22*    115* 91   BUN 58* 58* 58*   CREATININE 3.1* 3.1* 3.1*   CALCIUM 8.9 8.9 8.5*   PROT  --  6.7 6.0   ALBUMIN  --  3.3* 2.9*   BILITOT  --  0.7 0.6   ALKPHOS  --  58 51   AST  --  14 13   ALT  --  <5* <5*   ANIONGAP 11 12 10       Significant Imaging: I have reviewed all pertinent imaging results/findings within the past 24 hours.      Assessment & Plan  Acute exacerbation of congestive heart failure  Patient has Combined Systolic and Diastolic heart failure that is Acute on chronic. On presentation their CHF was decompensated. Evidence of decompensated CHF on presentation includes: edema, crackles on lung  auscultation, orthopnea, and shortness of breath. The etiology of their decompensation is likely the stoppage of some of her BP and HF medications.  Most recent BNP and echo results are listed below.  Recent Labs     06/03/25  1007   BNP 4,062*     Latest ECHO  Results for orders placed during the hospital encounter of 05/22/25    Echo    Interpretation Summary    Left Ventricle: The left ventricle is normal in size. Moderately increased ventricular mass. Increased wall thickness. Mild septal thickening. There is moderate concentric hypertrophy. Mild global hypokinesis and regional wall motion abnormalities present. See diagram for wall motion findings. There is mildly reduced systolic function with a visually estimated ejection fraction of 45 - 50%. Ejection fraction is approximately 48%. Quantitated ejection fraction is 45%. There is indeterminate diastolic function.    Right Ventricle: The right ventricle is normal in size Wall thickness is normal. Systolic function is normal.    Left Atrium: The left atrium is severely dilated    Aortic Valve: There is mild aortic valve sclerosis. There is mild to mild-moderate aortic regurgitation.    Mitral Valve: There is mild regurgitation.    Tricuspid Valve: There is mild regurgitation.    Pulmonary Artery: There is mild pulmonary hypertension. The estimated pulmonary artery systolic pressure is 46 mmHg.    IVC/SVC: Elevated venous pressure at 15 mmHg.    Current Heart Failure Medications  valsartan tablet 320 mg, Daily, Oral  hydrALAZINE tablet 100 mg, Every 8 hours, Oral  hydrALAZINE injection 10 mg, Every 6 hours PRN, Intravenous  furosemide injection 80 mg, Daily, Intravenous  , 2 times daily, Oral    Plan  - Monitor strict I&Os and daily weights.    - Place on telemetry  - Low sodium diet  - Place on fluid restriction of 1.5 L.   - Cardiology has not been consulted  - The patient's volume status is worsening as indicated by edema, crackles on lung auscultation, weight  gain, orthopnea, and shortness of breath. Will continue current treatment    Hypertensive urgency  Patient has a current diagnosis of Hypertensive emergency with end organ damage evidenced by acute heart failure which is uncontrolled.  Latest blood pressure and vitals reviewed-   Temp:  [97.6 °F (36.4 °C)-98 °F (36.7 °C)]   Pulse:  []   Resp:  [13-20]   BP: (134-206)/(74-96)   SpO2:  [92 %-99 %] .   Patient currently off IV antihypertensives.   Home meds for hypertension were reviewed and noted below.   Hypertension Medications              cloNIDine 0.1 mg/24 hr td ptwk (CATAPRES) 0.1 mg/24 hr Place 1 patch onto the skin every 7 days.    doxazosin (CARDURA) 8 MG Tab Take 1 tablet (8 mg total) by mouth once daily.    eplerenone (INSPRA) 50 MG Tab ([Paused] since 5/25/2025  1:35 PM) Take 1 tablet (50 mg total) by mouth once daily.    hydrALAZINE (APRESOLINE) 100 MG tablet Take 1 tablet (100 mg total) by mouth every 8 (eight) hours.    valsartan (DIOVAN) 320 MG tablet Take 1 tablet (320 mg total) by mouth once daily.          Presenting SBP >200 with signs of end-organ dysfunction (CHF).  Restarted home antihypertensives including hydralazine 100 mg TID, valsartan, doxazosin, clonidine patch.  Monitor BP closely (q1hr while on IV agents if initiated).  Avoid overly rapid BP lowering; goal is gradual reduction (by ~25% in first 24 hrs).     Will aim for controlled BP reduction by medications noted above. Monitor and mitigate end organ damage as indicated.  Persistent atrial fibrillation  Patient has paroxysmal (<7 days) atrial fibrillation. Patient is currently in sinus rhythm. [id number] Score: 3. The patients heart rate in the last 24 hours is as follows:  Pulse  Min: 65  Max: 100     Antiarrhythmics       Anticoagulants  rivaroxaban tablet 15 mg, With dinner, Oral    Plan  - Replete lytes with a goal of K>4, Mg >2  - Patient is anticoagulated, see medications listed above.  - Patient's afib is currently  controlled      Acute-on-chronic kidney injury   Baseline creatinine is 2.6-2.8. Most recent creatinine and eGFR are listed below.  Recent Labs     06/03/25  0839 06/03/25  1007 06/04/25  0425   CREATININE 3.1* 3.1* 3.1*   EGFRNORACEVR 21* 21* 21*      Plan  - PHUONG is worsening. Will continue current treatment  - Avoid nephrotoxins and renally dose meds for GFR listed above  - Monitor urine output, serial BMP, and adjust therapy as needed      Diabetic polyneuropathy associated with type 2 diabetes mellitus    Continue Gabapentin  Hyperlipidemia    Continue current statin therapy.  Immunocompromised state due to drug therapy  On daily Tacrolimus    KTM consulted  Will follow 48-hourly Tacro levels    Elevated troponin  Troponin elevation likely secondary to demand ischemia from CHF/HTN; continue to trend.    Plateaued and downtrended  Telemetry monitoring for rate/rhythm.  Anemia of chronic disease  Anemia is likely due to chronic disease due to Chronic Kidney Disease. Most recent hemoglobin and hematocrit are listed below.  Recent Labs     06/03/25  0839 06/04/25  0425   HGB 9.3* 8.7*   HCT 32.0* 29.2*     Plan  - Monitor serial CBC: Daily  - Transfuse PRBC if patient becomes hemodynamically unstable, symptomatic or H/H drops below 7/21.  - Patient has not received any PRBC transfusions to date  - Patient's anemia is currently stable    Peripheral neuropathy  Continue Gabapentin    Insulin pump in place  Endocrinology consulted for Insulin adjustments while inpatient    Type 2 diabetes mellitus with stage 4 chronic kidney disease, with long-term current use of insulin  Creatine stable for now. BMP reviewed- noted Estimated Creatinine Clearance: 26.1 mL/min (A) (based on SCr of 3.1 mg/dL (H)). according to latest data. Based on current GFR, CKD stage is stage 4 - GFR 15-29.  Monitor UOP and serial BMP and adjust therapy as needed. Renally dose meds. Avoid nephrotoxic medications and procedures.  Long-term use of  immunosuppressant medication  - Cont tacro 1/1 and pred 5  - Cont holding cellcept  - Continue to monitor daily levels and monitor for toxic side effects, and adjust for therapeutic dose.     VTE Risk Mitigation (From admission, onward)           Ordered     rivaroxaban tablet 15 mg  With dinner         06/03/25 1421     IP VTE HIGH RISK PATIENT  Once         06/03/25 1326     Place sequential compression device  Until discontinued         06/03/25 1326     Reason for No Pharmacological VTE Prophylaxis  Once        Question:  Reasons:  Answer:  Already adequately anticoagulated on oral Anticoagulants    06/03/25 1326                    Discharge Planning   UBALDO: 6/6/2025     Code Status: Full Code   Medical Readiness for Discharge Date:   Discharge Plan A: Home          Zuri Vidales MD  Department of Hospital Medicine   [facility] - Acute Medical Stepdown

## 2025-06-04 NOTE — ASSESSMENT & PLAN NOTE
Patient has paroxysmal (<7 days) atrial fibrillation. Patient is currently in sinus rhythm. [id number] Score: 3. The patients heart rate in the last 24 hours is as follows:  Pulse  Min: 64  Max: 117     Antiarrhythmics       Anticoagulants  rivaroxaban tablet 15 mg, With dinner, Oral    Plan  - Replete lytes with a goal of K>4, Mg >2  - Patient is anticoagulated, see medications listed above.  - Patient's afib is currently controlled    :[id number]}

## 2025-06-04 NOTE — ASSESSMENT & PLAN NOTE
Troponin elevation likely secondary to demand ischemia from CHF/HTN; continue to trend.    Plateaued and downtrending  Telemetry monitoring for rate/rhythm.

## 2025-06-04 NOTE — HOSPITAL COURSE
Interval history: No acute events overnight. Pt feeling well this am. Reports SOB has improved. Plan to increase lasix today for further diuresis. Recommend repeat TTE. Pt w/ PHUONG, Cr 3.1. Cont tacro 1/1 and pred 5. Cont holding cellcept. VSS. Monitor.

## 2025-06-04 NOTE — ASSESSMENT & PLAN NOTE
- Cont tac 1/1 mg   - Cont pred 5  - Cont holding cellcept  - Continue to monitor prograf levels daily, monitor for toxic side effects, and adjust for therapeutic dose.

## 2025-06-04 NOTE — ASSESSMENT & PLAN NOTE
Troponin elevation likely secondary to demand ischemia from CHF/HTN; continue to trend.    Plateaued and downtrended  Telemetry monitoring for rate/rhythm.

## 2025-06-04 NOTE — ASSESSMENT & PLAN NOTE
- Cont tacro 1/1 and pred 5  - Cont holding cellcept  - Continue to monitor prograf levels daily, monitor for toxic side effects, and adjust for therapeutic dose.

## 2025-06-04 NOTE — HOSPITAL COURSE
69-year-old male with complex medical history including ESRD s/p kidney transplant (2016), HF with mildly reduced EF (~45%), atrial fibrillation/flutter on Xarelto, and T2DM managed with an insulin pump, presented with worsening dyspnea, orthopnea, and LE edema. He was hypertensive (SBP >200), with pulmonary edema on CXR and BNP ~4000, indicating acute volume overload. No leukocytosis or signs of infection. Renal function remained at baseline (Cr 3.1). His home antihypertensive regimen had been modified prior to presentation (notably discontinuation of HCTZ and eplerenone), possibly contributing to decompensation.He was managed with IV diuretics for volume control, which led to marked symptomatic improvement. However, persistent hypertension required rebuilding his antihypertensive regimen in collaboration with Nephrology and Cardiology.  Key medication changes:  Valsartan stopped; Replaced with Entresto (sacubitril/valsartan) for RAAS blockade and cardiac remodeling  Hydralazine stopped ; Replaced with BiDil (hydralazine + isosorbide dinitrate) for combined afterload and preload reduction, especially beneficial in  patients with HF  Metoprolol succinate started for rate control and neurohormonal support  Xarelto (rivaroxaban) continued for stroke prevention in AF  Tacrolimus and mycophenolate mofetil continued per transplant protocol  Endocrinology reviewed and adjusted insulin pump settings; glucose remained well controlled. No episodes of hypoglycemia or DKA.  Patient remained hemodynamically stable, euvolemic, and rate-controlled throughout the latter part of hospitalization.  Highlights of discharge Medications:  Sacubitril/Valsartan (Entresto) [moderate BID initiated  BiDil (Hydralazine + Isosorbide Dinitrate) TID initiated  Metoprolol Succinate initiated  Torsemide started at discharge  Rivaroxaban (Xarelto)continued  Tacrolimus / Mycophenolate mofetil continued  Insulin (via pump) continued  per endocrinology guidance    Follow-Up Plan:  Nephrology (Transplant): In 1 week BP management, tacrolimus level, renal function monitoring  Cardiology: Rhythm and HFmrEF management  Endocrinology: Ongoing insulin pump oversight  Labs before nephrology follow-up: BMP, tacrolimus trough, BNP, CBC    Condition at Discharge:  Stable and euvolemic  BP controlled on revised regimen  Rhythm stable  Ambulating independently  Counseled extensively on new medications and follow-up plans

## 2025-06-04 NOTE — ASSESSMENT & PLAN NOTE
Patient has a current diagnosis of Hypertensive emergency with end organ damage evidenced by acute heart failure which is uncontrolled.  Latest blood pressure and vitals reviewed-   Temp:  [97.1 °F (36.2 °C)-97.9 °F (36.6 °C)]   Pulse:  []   Resp:  [16-20]   BP: (165-215)/()   SpO2:  [95 %-99 %] .   Patient currently off IV antihypertensives.   Home meds for hypertension were reviewed and noted below.   Hypertension Medications              cloNIDine 0.1 mg/24 hr td ptwk (CATAPRES) 0.1 mg/24 hr Place 1 patch onto the skin every 7 days.    doxazosin (CARDURA) 8 MG Tab Take 1 tablet (8 mg total) by mouth once daily.    eplerenone (INSPRA) 50 MG Tab ([Paused] since 5/25/2025  1:35 PM) Take 1 tablet (50 mg total) by mouth once daily.    hydrALAZINE (APRESOLINE) 100 MG tablet Take 1 tablet (100 mg total) by mouth every 8 (eight) hours.    valsartan (DIOVAN) 320 MG tablet Take 1 tablet (320 mg total) by mouth once daily.          Presenting SBP >200 with signs of end-organ dysfunction (CHF).  Restarted home antihypertensives including hydralazine 100 mg TID, valsartan, doxazosin, clonidine patch.  Monitor BP closely (q1hr while on IV agents if initiated).  Avoid overly rapid BP lowering; goal is gradual reduction (by ~25% in first 24 hrs).     Will aim for controlled BP reduction by medications noted above. Monitor and mitigate end organ damage as indicated.

## 2025-06-04 NOTE — PROGRESS NOTES
[facility] - Acute Medical Stepdown  Kidney Transplant  Progress Note      Reason for Follow-up: Reassessment of Kidney Transplant - 2016  (#1) recipient and management of immunosuppression.     ORGAN:   RIGHT KIDNEY    Donor Type:   Donation after Brain Death        Subjective:   History of Present Illness:  69 yr old well known to our service. ESRD secondary to DM s/p a  donor kidney transplant on 2016, AFIB on Xarelto, CHF (EF of 40-45% with mild pHTN), hx of CVA who was recently discharged on 25 after another hospitalization for SOB, cough concerning for recurrent PNA. Ruled out for TB during that admission with patient d/c on Levaquin with outpatient follow up with pulm for possible bronch if symptoms persisted.      Patient states that after discharge he felt recurrence of similar symptoms- noted to have SOB, DOSS and PND where he was not able to lie flat at night to sleep. Per wife, he was able to sleep some in reclined position. Has been taking Lasix 80 mg by mouth when he has swelling of his lower extremity. Patient otherwise denies any fever, HA, sore throat. Does admit to a nonproductive cough. Denies any N/V/diarrhea.      On admission, patient noted to be hypertensive to 200's with rate controlled AFIB. O2 sat of 95-97% on RA. CXR concerning for pulmonary edema with BNP elevated to 4,000. Patient given lasix 80 mg IV.     Mr. Zamorano is a 69 y.o. year old male who is status post Kidney Transplant - 2016  (#1).    His maintenance immunosuppression consists of:   Immunosuppressants (From admission, onward)      Start     Stop Route Frequency Ordered    25 1800  tacrolimus capsule 1 mg         -- Oral 2 times daily 25 1421            Interval history: No acute events overnight. Pt feeling well this am. Reports SOB has improved. Plan to increase lasix today for further diuresis. Recommend repeat TTE. Pt w/ PHUONG, Cr 3.1. Cont tacro  and pred 5. Cont holding cellcept.  VSS. Monitor.     Past Medical, Surgical, Family, and Social History:   Unchanged from H&P.    Scheduled Meds:   atorvastatin  80 mg Oral Daily    [START ON 6/10/2025] cloNIDine 0.1 mg/24 hr td ptwk  1 patch Transdermal Q7 Days    doxazosin  8 mg Oral Daily    furosemide (LASIX) injection  80 mg Intravenous Daily    gabapentin  300 mg Oral BID    hydrALAZINE  100 mg Oral Q8H    predniSONE  5 mg Oral Daily    rivaroxaban  15 mg Oral Daily with dinner    tacrolimus  1 mg Oral BID    valsartan  320 mg Oral Daily     Continuous Infusions:   insulin aspart U-100  0.2-1 Units/hr Subcutaneous Continuous         PRN Meds:  Current Facility-Administered Medications:     dextrose 50%, 12.5 g, Intravenous, PRN    dextrose 50%, 25 g, Intravenous, PRN    glucagon (human recombinant), 1 mg, Intramuscular, PRN    glucose, 16 g, Oral, PRN    glucose, 24 g, Oral, PRN    hydrALAZINE, 10 mg, Intravenous, Q6H PRN    insulin aspart U-100, 0-10 Units, Subcutaneous, PRN    melatonin, 6 mg, Oral, Nightly PRN    naloxone, 0.02 mg, Intravenous, PRN    ondansetron, 8 mg, Oral, Q8H PRN    polyethylene glycol, 17 g, Oral, BID PRN    sodium chloride 0.9%, 10 mL, Intravenous, Q12H PRN    sodium chloride 0.9%, 10 mL, Intravenous, PRN    Intake/Output - Last 3 Shifts         06/02 0700  06/03 0659 06/03 0700  06/04 0659 06/04 0700  06/05 0659    Urine (mL/kg/hr)  3000 350 (0.6)    Stool   0    Total Output  3000 350    Net  -3000 -350           Unmeasured Urine Occurrence  3 x     Unmeasured Stool Occurrence   0 x             Review of Systems   Constitutional:  Negative for chills and fever.   Respiratory:  Positive for cough and shortness of breath (improved).    Cardiovascular:  Positive for leg swelling. Negative for chest pain.   Gastrointestinal:  Negative for constipation, diarrhea, nausea and vomiting.   Endocrine: Negative for polydipsia and polyuria.   Genitourinary:  Negative for decreased urine volume and difficulty urinating.  "  Allergic/Immunologic: Positive for immunocompromised state.   Psychiatric/Behavioral:  Negative for behavioral problems and confusion.       Objective:     Vital Signs (Most Recent):  Temp: 97.6 °F (36.4 °C) (06/04/25 1101)  Pulse: 98 (06/04/25 1157)  Resp: 20 (06/04/25 1101)  BP: (!) 172/85 (06/04/25 1101)  SpO2: 98 % (06/04/25 1101) Vital Signs (24h Range):  Temp:  [97.6 °F (36.4 °C)-98 °F (36.7 °C)] 97.6 °F (36.4 °C)  Pulse:  [] 98  Resp:  [13-20] 20  SpO2:  [92 %-99 %] 98 %  BP: (134-206)/(74-96) 172/85     Weight: 90.7 kg (199 lb 15.3 oz)  Height: 6' 1" (185.4 cm)  Body mass index is 26.38 kg/m².     Physical Exam  Vitals and nursing note reviewed.   Cardiovascular:      Rate and Rhythm: Normal rate.      Pulses: Normal pulses.   Pulmonary:      Effort: Pulmonary effort is normal.   Abdominal:      General: Bowel sounds are normal.      Palpations: Abdomen is soft.      Tenderness: There is no abdominal tenderness.   Musculoskeletal:      Right lower leg: Edema present.      Left lower leg: Edema present.   Skin:     General: Skin is warm and dry.   Neurological:      Mental Status: He is alert and oriented to person, place, and time. Mental status is at baseline.   Psychiatric:         Mood and Affect: Mood normal.         Behavior: Behavior normal.         Thought Content: Thought content normal.         Judgment: Judgment normal.          Laboratory:  CBC:   Recent Labs   Lab 06/03/25  0839 06/04/25  0425   WBC 6.72 5.32   RBC 4.11* 3.94*   HGB 9.3* 8.7*   HCT 32.0* 29.2*    150   MCV 78* 74*   MCH 22.6* 22.1*   MCHC 29.1* 29.8*     CMP:   Recent Labs   Lab 06/03/25  0839 06/03/25  1007 06/04/25  0425    115* 91   CALCIUM 8.9 8.9 8.5*   ALBUMIN  --  3.3* 2.9*   PROT  --  6.7 6.0    139 136   K 4.0 4.3 3.7   CO2 23 22* 22*    105 104   BUN 58* 58* 58*   CREATININE 3.1* 3.1* 3.1*   ALKPHOS  --  58 51   ALT  --  <5* <5*   AST  --  14 13     Labs within the past 24 hours have " "been reviewed.    Diagnostic Results:  US - Kidney: Results for orders placed during the hospital encounter of 25    US Transplant Kidney With Doppler    Narrative  EXAMINATION:  US TRANSPLANT KIDNEY WITH DOPPLER    CLINICAL HISTORY:  Patient with kidney transplant presenting with PHUONG;    TECHNIQUE:  Real time gray scale and doppler ultrasound was performed over the patient's renal allograft.    COMPARISON:  Multiple prior exams, most recent from 2024.    FINDINGS:  Renal allograft in the right lower quadrant.  The allograft measures 13.3 cm. Normal perfusion. No hydronephrosis.    No fluid collections.    Vasculature:    Resistive indices ranged from 0.72 to 0.84.    Main renal artery peak systolic velocity: 188cm/sec with normal waveform.    Renal artery/iliac ratio: 1.1.    The main renal vein is patent.    Impression  Mildly elevated intraparenchymal resistive indices which may be seen with medical renal disease, rejection, or drug toxicity.      Electronically signed by: Mateo Fields MD  Date:    2025  Time:    11:40  Assessment/Plan:     * Acute exacerbation of congestive heart failure  - Cont diuresis, will likely increase dose of lasix  - Recommend repeat TTE  - Rest of care per primary      PHUONG (acute kidney injury)  - Cr 3.1  - BL Cr 2.4-2.8  - Monitor with daily labs       Anemia of chronic disease  - H/H stable. Will continue to monitor with daily cbc.       Status post -donor kidney transplantation  - S/p KTX 16 for DMII  - See "Acute kidney injury"      Long-term use of immunosuppressant medication  - Cont tacro 1/1 and pred 5  - Cont holding cellcept  - Continue to monitor prograf levels daily, monitor for toxic side effects, and adjust for therapeutic dose.       Hypertensive urgency  - On valsartan 320 mg daily, hydralazine 100 TID, and clonidine   - Okay to start on Imdur      Immunocompromised state due to drug therapy  - Cont tac 1/1 mg   - Cont pred 5  - Cont " holding cellcept  - Continue to monitor prograf levels daily, monitor for toxic side effects, and adjust for therapeutic dose.       Prophylactic immunotherapy  - see long term use of immunosuppression       Discharge Planning: Not a candidate for d/c at this time.     Medical decision making for this encounter includes review of pertinent labs and diagnostic studies, assessment and planning, discussions with consulting providers, discussion with patient/family, and participation in multidisciplinary rounds. Time spent caring for patient: 60 minutes    Argelia Levine PA-C  Kidney Transplant  [facility] - Acute [facility] Step[facility]

## 2025-06-04 NOTE — ASSESSMENT & PLAN NOTE
- Cont tacro 1/1 and pred 5  - Cont holding cellcept  - Continue to monitor daily levels and monitor for toxic side effects, and adjust for therapeutic dose.

## 2025-06-04 NOTE — ASSESSMENT & PLAN NOTE
Creatine stable for now. BMP reviewed- noted Estimated Creatinine Clearance: 26.1 mL/min (A) (based on SCr of 3.1 mg/dL (H)). according to latest data. Based on current GFR, CKD stage is stage 4 - GFR 15-29.  Monitor UOP and serial BMP and adjust therapy as needed. Renally dose meds. Avoid nephrotoxic medications and procedures.

## 2025-06-04 NOTE — ASSESSMENT & PLAN NOTE
- Cont diuresis, will likely increase dose of lasix  - Recommend repeat TTE  - Rest of care per primary

## 2025-06-04 NOTE — SUBJECTIVE & OBJECTIVE
"Interval HPI:   No acute events overnight. Patient in room 70857/45096 A. Blood glucose stable. BG at goal on current insulin regimen (SSI ). Steroid use- None .      Renal function- Abnormal -     Vasopressors-  None     Diet Low Sodium (2 gm) Fluid - 1500mL     Eatin%  Nausea: No  Hypoglycemia and intervention: No  Fever: No  TPN and/or TF: No     BP (!) 206/95 (BP Location: Right arm, Patient Position: Lying)   Pulse 66   Temp 97.8 °F (36.6 °C) (Oral)   Resp 13   Ht 6' 1" (1.854 m)   Wt 90.7 kg (199 lb 15.3 oz)   SpO2 (!) 92%   BMI 26.38 kg/m²     Labs Reviewed and Include    Recent Labs   Lab 255   GLU 91   CALCIUM 8.5*   ALBUMIN 2.9*   PROT 6.0      K 3.7   CO2 22*      BUN 58*   CREATININE 3.1*   ALKPHOS 51   ALT <5*   AST 13   BILITOT 0.6     Lab Results   Component Value Date    WBC 5.32 2025    HGB 8.7 (L) 2025    HCT 29.2 (L) 2025    MCV 74 (L) 2025     2025     No results for input(s): "TSH", "FREET4" in the last 168 hours.  Lab Results   Component Value Date    HGBA1C 6.4 (H) 2025       Nutritional status:   Body mass index is 26.38 kg/m².  Lab Results   Component Value Date    ALBUMIN 2.9 (L) 2025    ALBUMIN 3.3 (L) 2025    ALBUMIN 2.6 (L) 2025     No results found for: "PREALBUMIN"    Estimated Creatinine Clearance: 26.1 mL/min (A) (based on SCr of 3.1 mg/dL (H)).    Accu-Checks  Recent Labs     25  0634 25  0731   POCTGLUCOSE 105 107       Current Medications and/or Treatments Impacting Glycemic Control  Immunotherapy:    Immunosuppressants           Stop Route Frequency     tacrolimus capsule 1 mg         -- Oral 2 times daily          Steroids:   Hormones (From admission, onward)      Start     Stop Route Frequency Ordered    25 0900  predniSONE tablet 5 mg         -- Oral Daily 25 1421    25 1424  melatonin tablet 6 mg         -- Oral Nightly PRN 25 1326      "     Pressors:    Autonomic Drugs (From admission, onward)      None          Hyperglycemia/Diabetes Medications:   Antihyperglycemics (From admission, onward)      Start     Stop Route Frequency Ordered    06/03/25 1745  insulin aspart U-100 insulin pump from home        Question Answer Comment   Target number 110    Basal Rate #1 0.6    Basal rate #1 time 1431-9348        -- SubQ Continuous 06/03/25 1632    06/03/25 1730  insulin aspart U-100 insulin pump from home 0-10 Units        Question Answer Comment   Target number 110    Carbohydrate coverage #1 1:7.5    Carbohydrate coverage #1 time 4007-7248    Sensitivity #1 1:40    Sensitivity #1 time [id number]        -- SubQ As needed (PRN) 06/03/25 1632

## 2025-06-04 NOTE — ASSESSMENT & PLAN NOTE
Baseline creatinine is 2.6-2.8. Most recent creatinine and eGFR are listed below.  Recent Labs     06/03/25  0839 06/03/25  1007   CREATININE 3.1* 3.1*   EGFRNORACEVR 21* 21*      Plan  - PHUONG is worsening. Will continue current treatment  - Avoid nephrotoxins and renally dose meds for GFR listed above  - Monitor urine output, serial BMP, and adjust therapy as needed

## 2025-06-04 NOTE — PLAN OF CARE
[facility] Care Support Interaction      I have reviewed the chart of Ravi Zamorano who is hospitalized for Acute exacerbation of congestive heart failure. The patient is currently located in the following unit: [facility]       I have seen and examined the patient and provided the following support:     Readmission Reduction - Acute Care at Home Referral       Jaz Rodriguez PA-C  Unit [facility]

## 2025-06-04 NOTE — SUBJECTIVE & OBJECTIVE
Interval Update: Diuresis ongoing with signifcant symptomatic improvement per CHF exacerbation,but patient's BP remained relatively high overall. KTM recommends IMDUR. Restarted Doxazosin.    Review of Systems   Constitutional:  Negative for chills and fever.   Respiratory:  Positive for cough and shortness of breath (improved).    Cardiovascular:  Positive for leg swelling. Negative for chest pain.   Gastrointestinal:  Negative for constipation, diarrhea, nausea and vomiting.   Endocrine: Negative for polydipsia and polyuria.   Genitourinary:  Negative for decreased urine volume and difficulty urinating.   Allergic/Immunologic: Positive for immunocompromised state.   Psychiatric/Behavioral:  Negative for behavioral problems and confusion.      Objective:     Vital Signs (Most Recent):  Temp: 97.6 °F (36.4 °C) (06/04/25 1101)  Pulse: 98 (06/04/25 1157)  Resp: 20 (06/04/25 1101)  BP: (!) 172/85 (06/04/25 1101)  SpO2: 98 % (06/04/25 1101) Vital Signs (24h Range):  Temp:  [97.6 °F (36.4 °C)-98 °F (36.7 °C)] 97.6 °F (36.4 °C)  Pulse:  [] 98  Resp:  [13-20] 20  SpO2:  [92 %-99 %] 98 %  BP: (134-206)/(74-96) 172/85     Weight: 90.7 kg (199 lb 15.3 oz)  Body mass index is 26.38 kg/m².    Intake/Output Summary (Last 24 hours) at 6/4/2025 1245  Last data filed at 6/4/2025 0830  Gross per 24 hour   Intake --   Output 3350 ml   Net -3350 ml         Physical Exam  Constitutional:       General: He is not in acute distress.     Appearance: Normal appearance. He is not ill-appearing.   HENT:      Head: Normocephalic and atraumatic.      Nose: No congestion.   Eyes:      Extraocular Movements: Extraocular movements intact.      Conjunctiva/sclera: Conjunctivae normal.   Cardiovascular:      Rate and Rhythm: Normal rate. Rhythm irregular.      Pulses: Normal pulses.      Heart sounds: Murmur heard.   Pulmonary:      Effort: Pulmonary effort is normal. No respiratory distress.      Breath sounds: Examination of the right-lower  field reveals rales. Examination of the left-lower field reveals rales. Rales (bilateral) present. No rhonchi (resolved).   Abdominal:      General: There is no distension.      Palpations: Abdomen is soft.      Tenderness: There is no abdominal tenderness.   Musculoskeletal:      Right lower leg: Edema (minimal) present.      Left lower leg: Edema present.   Skin:     General: Skin is warm.      Coloration: Skin is not pale.   Neurological:      General: No focal deficit present.      Mental Status: He is alert and oriented to person, place, and time.   Psychiatric:         Mood and Affect: Mood normal.         Thought Content: Thought content normal.               Significant Labs: All pertinent labs within the past 24 hours have been reviewed.  CBC:   Recent Labs   Lab 06/03/25  0839 06/04/25  0425   WBC 6.72 5.32   HGB 9.3* 8.7*   HCT 32.0* 29.2*    150     CMP:   Recent Labs   Lab 06/03/25  0839 06/03/25  1007 06/04/25  0425    139 136   K 4.0 4.3 3.7    105 104   CO2 23 22* 22*    115* 91   BUN 58* 58* 58*   CREATININE 3.1* 3.1* 3.1*   CALCIUM 8.9 8.9 8.5*   PROT  --  6.7 6.0   ALBUMIN  --  3.3* 2.9*   BILITOT  --  0.7 0.6   ALKPHOS  --  58 51   AST  --  14 13   ALT  --  <5* <5*   ANIONGAP 11 12 10       Significant Imaging: I have reviewed all pertinent imaging results/findings within the past 24 hours.

## 2025-06-04 NOTE — ASSESSMENT & PLAN NOTE
Patient has paroxysmal (<7 days) atrial fibrillation. Patient is currently in sinus rhythm. [id number] Score: 3. The patients heart rate in the last 24 hours is as follows:  Pulse  Min: 65  Max: 100     Antiarrhythmics       Anticoagulants  rivaroxaban tablet 15 mg, With dinner, Oral    Plan  - Replete lytes with a goal of K>4, Mg >2  - Patient is anticoagulated, see medications listed above.  - Patient's afib is currently controlled       no

## 2025-06-04 NOTE — PLAN OF CARE
[facility] Care Support Interaction      I have reviewed the chart of Ravi Zamorano who is hospitalized for Acute exacerbation of congestive heart failure. The patient is currently located in the following unit: [facility]       I have seen and examined the patient and provided the following support:     Proactive rounds - patient is stable. Patient seen at bedside for tachycardia. Known Afib. Patient without complaints, no symptoms. Afib with RVR, rates initially in 130s, but normalized without intervention, now HR 90s. Primary team aware.        Jaz Rodriguez PA-C  [facility]

## 2025-06-04 NOTE — H&P
[facility] Medicine  History & Physical    Patient Name: Ravi Zamorano  MRN: [id number]  Patient Class: OP- Observation  Admission Date: 6/3/2025  Attending Physician: Devin Dubois MD   Primary Care Provider: Mima Mack MD         Patient information was obtained from patient and ER records.     Subjective:     Principal Problem:Acute exacerbation of congestive heart failure    Chief Complaint:   Chief Complaint   Patient presents with    Shortness of Breath    Palpitations     Hx Afib        HPI: 69-year-old male with a complex history including CKD s/p kidney transplant (2016, on tacrolimus), atrial fibrillation (on rivaroxaban), hypertension, hyperlipidemia, heart failure with preserved EF, and prior CVA, presents with progressively worsening shortness of breath for the past several days. He endorses orthopnea but denies paroxysmal nocturnal dyspnea, chest pain, or new palpitations. He denies recent dietary changes or non-compliance with medications. He was recently discharged following treatment for pneumonia. In the ED, he was afebrile but severely hypertensive (SBP >200 mmHg). Workup showed BNP >4000 and a troponin peak of 106. CXR revealed cardiomegaly and moderate pulmonary edema, concerning for acute on chronic CHF exacerbation. Diuresis with IV furosemide was initiated, and antihypertensives (hydralazine) restarted.    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    CKD (chronic kidney disease) stage 4, GFR 15-29 ml/min 07/29/2014    Colon polyps 2014    Depression - situational 07/29/2014    Diabetes type 2 since 2000 07/29/2014    Diabetic neuropathy 07/29/2014    Encounter for blood transfusion     History of cardioversion 10/03/2019    History of hepatitis C, s/p successful Rx w/ SVR24 -  9/2017 07/29/2014    Genotype 1a, treatment naive 10/2014 liver biopsy - grade 1 / stage 1 Completed Harvoni w/ SVR    Hyperlipidemia 07/29/2014    Hypertension     Neuropathy     Organ transplant candidate 07/29/2014    Pancreatitis     S/P cadaveric kidney transplant 11/5/2016. ESRD secondary to HTN/DMII 11/05/2016    Type 2 diabetes mellitus with stage 3a chronic kidney disease, with long-term current use of insulin 07/29/2014       Past Surgical History:   Procedure Laterality Date    ABLATION OF ARRHYTHMOGENIC FOCUS FOR ATRIAL FIBRILLATION N/A 6/11/2024    Procedure: Ablation atrial fibrillation;  Surgeon: Bronson Bowden MD;  Location: [facility] EP LAB;  Service: Cardiology;  Laterality: N/A;  AF, FELICIANO (cx if SR), PVI, RFA, Carto, Gen, GP, 3 Prep    ABLATION, ATRIAL FLUTTER, TYPICAL  6/11/2024    Procedure: Ablation, Atrial Flutter, Typical;  Surgeon: Bronson Bowden MD;  Location: [facility] EP LAB;  Service: Cardiology;;    APPENDECTOMY      BONE MARROW BIOPSY Left 6/26/2024    Procedure: Biopsy-bone marrow;  Surgeon: Bridger Zapata MD;  Location: [facility] ENDO (4TH FLR);  Service: Oncology;  Laterality: Left;  6/24-pt knows to hold aspirin and eliquis as instructed by hem/onc, precall complete-Kpvt    CARDIOVERSION  6/11/2024    Procedure: Cardioversion;  Surgeon: Bronson Bowden MD;  Location: [facility] EP LAB;  Service: Cardiology;;    CATARACT EXTRACTION W/  INTRAOCULAR LENS IMPLANT Right 3/13/2024    Procedure: EXTRACTION, CATARACT, WITH IOL INSERTION;  Surgeon: Jennifer Palacio MD;  Location: On license of [facility] OR;  Service: Ophthalmology;  Laterality: Right;    CATARACT EXTRACTION W/  INTRAOCULAR LENS IMPLANT Left 4/10/2024    Procedure: EXTRACTION, CATARACT, WITH IOL INSERTION;  Surgeon: Jennifer Palacio MD;  Location: On license of [facility] OR;  Service: Ophthalmology;  Laterality: Left;    COLONOSCOPY N/A 12/21/2020    Procedure: COLONOSCOPY;  Surgeon: Tico Bell MD;  Location: [facility] ENDO (4TH FLR);  Service: Endoscopy;  Laterality:  N/A;  ok to hold eliquis per Dr. Valadez, see telephone encounter 11/13/2020-MS  covid test 12/18 Levy    ECHOCARDIOGRAM,TRANSESOPHAGEAL N/A 2/3/2025    Procedure: Transesophageal echo (FELICIANO) intra-procedure log documentation;  Surgeon: Grayson Lin III, MD;  Location: [facility] EP LAB;  Service: Cardiology;  Laterality: N/A;    KIDNEY TRANSPLANT      TRANSESOPHAGEAL ECHOCARDIOGRAPHY N/A 8/26/2019    Procedure: ECHOCARDIOGRAM, TRANSESOPHAGEAL;  Surgeon: [facility] Diagnostic Provider;  Location: [facility] EP LAB;  Service: Cardiology;  Laterality: N/A;    TRANSESOPHAGEAL ECHOCARDIOGRAPHY N/A 6/11/2024    Procedure: ECHOCARDIOGRAM, TRANSESOPHAGEAL;  Surgeon: Grayson Lin III, MD;  Location: [facility] EP LAB;  Service: Cardiology;  Laterality: N/A;    TREATMENT OF CARDIAC ARRHYTHMIA N/A 8/26/2019    Procedure: CARDIOVERSION;  Surgeon: Bronson Bowden MD;  Location: [facility] EP LAB;  Service: Cardiology;  Laterality: N/A;  AF, FELICIANO, DCCV, MAC, GP, 3 PREP    TREATMENT OF CARDIAC ARRHYTHMIA N/A 2/3/2025    Procedure: Cardioversion or Defibrillation;  Surgeon: Bronson Bowden MD;  Location: [facility] EP LAB;  Service: Cardiology;  Laterality: N/A;  AF, FELICIANO, DCCV, MAC, GP, 3 Prep       Review of patient's allergies indicates:   Allergen Reactions    Nifedipine Other (See Comments)     Gingival hyperplasia       Current Facility-Administered Medications on File Prior to Encounter   Medication    balanced salt irrigation intra-ocular solution 1 drop    epoetin tanya injection 4,000 Units    phenylephrine HCL 10% ophthalmic solution 1 drop    proparacaine 0.5 % ophthalmic solution 1 drop    sodium chloride 0.9% flush 10 mL    TETRAcaine HCl (PF) 0.5 % Drop 1 drop     Current Outpatient Medications on File Prior to Encounter   Medication Sig    acetaminophen (TYLENOL) 500 MG tablet Take 500 mg by mouth every 6 (six) hours as needed for Pain.    aspirin 81 mg Tab Take 81 mg by mouth once daily.    atorvastatin (LIPITOR) 80 MG tablet Take 1  tablet (80 mg total) by mouth once daily.    blood-glucose sensor (DEXCOM G7 SENSOR) Kayla Change sensor every 10 days.    cloNIDine 0.1 mg/24 hr td ptwk (CATAPRES) 0.1 mg/24 hr Place 1 patch onto the skin every 7 days.    doxazosin (CARDURA) 8 MG Tab Take 1 tablet (8 mg total) by mouth once daily.    empagliflozin (JARDIANCE) 10 mg tablet Take 1 tablet (10 mg total) by mouth once daily.    [Paused] eplerenone (INSPRA) 50 MG Tab Take 1 tablet (50 mg total) by mouth once daily.    ergocalciferol (ERGOCALCIFEROL) 50,000 unit Cap Take 1 capsule (50,000 Units total) by mouth every 7 days. (Patient taking differently: Take 50,000 Units by mouth every 7 days. Fridays)    famotidine (PEPCID AC ORAL) Take 1 tablet by mouth daily as needed (Acid reflux).    gabapentin (NEURONTIN) 300 MG capsule Take 1 capsule (300 mg total) by mouth 2 (two) times daily. (Patient taking differently: Take 300 mg by mouth 3 (three) times daily.)    hydrALAZINE (APRESOLINE) 100 MG tablet Take 1 tablet (100 mg total) by mouth every 8 (eight) hours.    insulin aspart U-100 (NOVOLOG U-100 INSULIN ASPART) 100 unit/mL injection Use in pump, max daily 100 units.    levoFLOXacin (LEVAQUIN) 750 MG tablet Take 1 tablet (750 mg total) by mouth every other day. for 6 days    magnesium oxide (MAG-OX) 400 mg (241.3 mg magnesium) tablet Take 1 tablet (400 mg total) by mouth 2 (two) times daily.    meclizine (ANTIVERT) 25 mg tablet Take 1 tablet (25 mg total) by mouth 3 (three) times daily as needed for Dizziness.    polyethylene glycol (MIRALAX) 17 gram PwPk Take 17 g by mouth daily as needed for Constipation. Take 17 gm (mixed in liquid) by mouth every day.    predniSONE (DELTASONE) 5 MG tablet Take 1 tablet (5 mg total) by mouth once daily.    rivaroxaban (XARELTO) 15 mg Tab Take 1 tablet (15 mg total) by mouth daily with dinner or evening meal.    tacrolimus (PROGRAF) 1 MG Cap Take 1 capsule (1 mg total) by mouth every 12 (twelve) hours.    valsartan  "(DIOVAN) 320 MG tablet Take 1 tablet (320 mg total) by mouth once daily.    blood sugar diagnostic Strp Use to check blood glucose 1 times daily, to use with insurance preferred meter    blood-glucose meter Misc Use to check blood glucose 1 times daily, to use with insurance preferred meter    insulin pump cart,auto,BT,G6/7 (OMNIPOD 5 G6-G7 PODS, GEN 5,) Crtg Change every 48 hours.    lancets 30 gauge Misc Use to check blood glucose 1 times daily, to use with insurance preferred meter    pen needle, diabetic (BD ULTRA-FINE LO PEN NEEDLE) 32 gauge x 5/32" Ndle USE TO ADMINISTER INSULIN 4 TIMES DAILY.    [DISCONTINUED] hydroCHLOROthiazide (HYDRODIURIL) 25 MG tablet Take 1 tablet (25 mg total) by mouth once daily.    [DISCONTINUED] insulin lispro (HUMALOG KWIKPEN INSULIN) 100 unit/mL pen Inject 18 units w/ breakfast, 16 units w/ lunch and dinner plus scale 150-200 +2, 201-250 +4, 251-300 +6, 301-350 +8.    [DISCONTINUED] mycophenolate (CELLCEPT) 250 mg Cap Take 2 capsules (500 mg total) by mouth 2 (two) times daily.     Family History       Problem Relation (Age of Onset)    Cancer Brother    Diabetes Mother    Heart disease Mother, Father    Hypertension Mother, Brother          Tobacco Use    Smoking status: Former     Current packs/day: 0.00     Average packs/day: 0.5 packs/day for 32.0 years (16.0 ttl pk-yrs)     Types: Cigarettes     Start date: 1984     Quit date: 2016     Years since quittin.5    Smokeless tobacco: Never   Substance and Sexual Activity    Alcohol use: Yes     Comment: Pt reports occasional beers on Sundays. Pt reports drinking daily prior to ESRD diagnosis.    Drug use: No    Sexual activity: Yes     Partners: Female     Review of Systems   HENT:  Negative for congestion.    Respiratory:  Positive for shortness of breath. Negative for cough.         Hemoptysis   Cardiovascular:  Positive for palpitations and leg swelling (mild). Negative for chest pain.   Gastrointestinal:  " Negative for abdominal distention and abdominal pain.   Genitourinary:  Negative for dysuria.   Musculoskeletal:  Positive for back pain.   Neurological:  Negative for headaches.     Objective:     Vital Signs (Most Recent):  Temp: 97.8 °F (36.6 °C) (06/03/25 1652)  Pulse: 65 (06/03/25 1652)  Resp: 16 (06/03/25 1652)  BP: (!) 205/95 (06/03/25 1652)  SpO2: 99 % (06/03/25 1652) Vital Signs (24h Range):  Temp:  [97.1 °F (36.2 °C)-97.9 °F (36.6 °C)] 97.8 °F (36.6 °C)  Pulse:  [] 65  Resp:  [16-20] 16  SpO2:  [95 %-99 %] 99 %  BP: (165-215)/() 205/95     Weight: 90.7 kg (199 lb 15.3 oz)  Body mass index is 26.38 kg/m².     Physical Exam  Constitutional:       General: He is not in acute distress.     Appearance: Normal appearance. He is not ill-appearing.   HENT:      Head: Normocephalic and atraumatic.      Nose: No congestion.   Eyes:      Extraocular Movements: Extraocular movements intact.      Conjunctiva/sclera: Conjunctivae normal.   Cardiovascular:      Rate and Rhythm: Normal rate. Rhythm irregular.      Pulses: Normal pulses.      Heart sounds: Murmur heard.   Pulmonary:      Effort: Pulmonary effort is normal. No respiratory distress.      Breath sounds: Examination of the right-lower field reveals rales. Examination of the left-lower field reveals rales. Rales (bilateral) present. No rhonchi (resolved).   Abdominal:      General: There is no distension.      Palpations: Abdomen is soft.      Tenderness: There is no abdominal tenderness.   Musculoskeletal:      Right lower leg: Edema (minimal) present.      Left lower leg: Edema present.   Skin:     General: Skin is warm.      Coloration: Skin is not pale.   Neurological:      General: No focal deficit present.      Mental Status: He is alert and oriented to person, place, and time.   Psychiatric:         Mood and Affect: Mood normal.         Thought Content: Thought content normal.                Significant Labs: All pertinent labs within the  past 24 hours have been reviewed.  CBC:   Recent Labs   Lab 06/03/25  0839   WBC 6.72   HGB 9.3*   HCT 32.0*        CMP:   Recent Labs   Lab 06/03/25  0839 06/03/25  1007    139   K 4.0 4.3    105   CO2 23 22*    115*   BUN 58* 58*   CREATININE 3.1* 3.1*   CALCIUM 8.9 8.9   PROT  --  6.7   ALBUMIN  --  3.3*   BILITOT  --  0.7   ALKPHOS  --  58   AST  --  14   ALT  --  <5*   ANIONGAP 11 12     Cardiac Markers:   Recent Labs   Lab 06/03/25  1007   BNP 4,062*       Significant Imaging: I have reviewed all pertinent imaging results/findings within the past 24 hours.  Assessment/Plan:     Assessment & Plan  Acute exacerbation of congestive heart failure  Patient has Combined Systolic and Diastolic heart failure that is Acute on chronic. On presentation their CHF was decompensated. Evidence of decompensated CHF on presentation includes: edema, crackles on lung auscultation, orthopnea, and shortness of breath. The etiology of their decompensation is likely the stoppage of some of her BP and HF medications.  Most recent BNP and echo results are listed below.  Recent Labs     06/03/25  1007   BNP 4,062*     Latest ECHO  Results for orders placed during the hospital encounter of 05/22/25    Echo    Interpretation Summary    Left Ventricle: The left ventricle is normal in size. Moderately increased ventricular mass. Increased wall thickness. Mild septal thickening. There is moderate concentric hypertrophy. Mild global hypokinesis and regional wall motion abnormalities present. See diagram for wall motion findings. There is mildly reduced systolic function with a visually estimated ejection fraction of 45 - 50%. Ejection fraction is approximately 48%. Quantitated ejection fraction is 45%. There is indeterminate diastolic function.    Right Ventricle: The right ventricle is normal in size Wall thickness is normal. Systolic function is normal.    Left Atrium: The left atrium is severely dilated    Aortic  Valve: There is mild aortic valve sclerosis. There is mild to mild-moderate aortic regurgitation.    Mitral Valve: There is mild regurgitation.    Tricuspid Valve: There is mild regurgitation.    Pulmonary Artery: There is mild pulmonary hypertension. The estimated pulmonary artery systolic pressure is 46 mmHg.    IVC/SVC: Elevated venous pressure at 15 mmHg.    Current Heart Failure Medications  valsartan tablet 320 mg, Daily, Oral  hydrALAZINE tablet 100 mg, Every 8 hours, Oral  hydrALAZINE injection 10 mg, Every 6 hours PRN, Intravenous  furosemide injection 80 mg, Daily, Intravenous    Plan  - Monitor strict I&Os and daily weights.    - Place on telemetry  - Low sodium diet  - Place on fluid restriction of 1.5 L.   - Cardiology has not been consulted  - The patient's volume status is worsening as indicated by edema, crackles on lung auscultation, weight gain, orthopnea, and shortness of breath. Will continue current treatment    Hypertensive urgency  Patient has a current diagnosis of Hypertensive emergency with end organ damage evidenced by acute heart failure which is uncontrolled.  Latest blood pressure and vitals reviewed-   Temp:  [97.1 °F (36.2 °C)-97.9 °F (36.6 °C)]   Pulse:  []   Resp:  [16-20]   BP: (165-215)/()   SpO2:  [95 %-99 %] .   Patient currently off IV antihypertensives.   Home meds for hypertension were reviewed and noted below.   Hypertension Medications              cloNIDine 0.1 mg/24 hr td ptwk (CATAPRES) 0.1 mg/24 hr Place 1 patch onto the skin every 7 days.    doxazosin (CARDURA) 8 MG Tab Take 1 tablet (8 mg total) by mouth once daily.    eplerenone (INSPRA) 50 MG Tab ([Paused] since 5/25/2025  1:35 PM) Take 1 tablet (50 mg total) by mouth once daily.    hydrALAZINE (APRESOLINE) 100 MG tablet Take 1 tablet (100 mg total) by mouth every 8 (eight) hours.    valsartan (DIOVAN) 320 MG tablet Take 1 tablet (320 mg total) by mouth once daily.          Presenting SBP >200 with signs  of end-organ dysfunction (CHF).  Restarted home antihypertensives including hydralazine 100 mg TID, valsartan, doxazosin, clonidine patch.  Monitor BP closely (q1hr while on IV agents if initiated).  Avoid overly rapid BP lowering; goal is gradual reduction (by ~25% in first 24 hrs).     Will aim for controlled BP reduction by medications noted above. Monitor and mitigate end organ damage as indicated.  Persistent atrial fibrillation  Patient has paroxysmal (<7 days) atrial fibrillation. Patient is currently in sinus rhythm. [id number] Score: 3. The patients heart rate in the last 24 hours is as follows:  Pulse  Min: 64  Max: 117     Antiarrhythmics       Anticoagulants  rivaroxaban tablet 15 mg, With dinner, Oral    Plan  - Replete lytes with a goal of K>4, Mg >2  - Patient is anticoagulated, see medications listed above.  - Patient's afib is currently controlled    :[id number]}  Acute-on-chronic kidney injury   Baseline creatinine is 2.6-2.8. Most recent creatinine and eGFR are listed below.  Recent Labs     06/03/25  0839 06/03/25  1007   CREATININE 3.1* 3.1*   EGFRNORACEVR 21* 21*      Plan  - PHUONG is worsening. Will continue current treatment  - Avoid nephrotoxins and renally dose meds for GFR listed above  - Monitor urine output, serial BMP, and adjust therapy as needed      Diabetic polyneuropathy associated with type 2 diabetes mellitus    Continue Gabapentin  Hyperlipidemia    Continue current statin therapy.  Immunocompromised state due to drug therapy  On daily Tacrolimus    KTM consulted  Will follow 48-hourly Tacro levels    Elevated troponin  Troponin elevation likely secondary to demand ischemia from CHF/HTN; continue to trend.    Plateaued and downtrending  Telemetry monitoring for rate/rhythm.  Anemia of chronic disease  Anemia is likely due to chronic disease due to Chronic Kidney Disease. Most recent hemoglobin and hematocrit are listed below.  Recent Labs     06/03/25  0839   HGB 9.3*   HCT 32.0*      Plan  - Monitor serial CBC: Daily  - Transfuse PRBC if patient becomes hemodynamically unstable, symptomatic or H/H drops below 7/21.  - Patient has not received any PRBC transfusions to date  - Patient's anemia is currently stable    Peripheral neuropathy  Continue Gabapentin    Insulin pump in place  Endocrinology consulted for Insulin adjustments while inpatient    VTE Risk Mitigation (From admission, onward)           Ordered     rivaroxaban tablet 15 mg  With dinner         06/03/25 1421     IP VTE HIGH RISK PATIENT  Once         06/03/25 1326     Place sequential compression device  Until discontinued         06/03/25 1326     Reason for No Pharmacological VTE Prophylaxis  Once        Question:  Reasons:  Answer:  Already adequately anticoagulated on oral Anticoagulants    06/03/25 1326                       Zuri Vidales MD  Department of Hospital Medicine  [facility]

## 2025-06-05 PROBLEM — G62.9 PERIPHERAL NEUROPATHY: Status: RESOLVED | Noted: 2024-12-07 | Resolved: 2025-06-05

## 2025-06-05 PROBLEM — N17.9 AKI (ACUTE KIDNEY INJURY): Status: RESOLVED | Noted: 2025-02-20 | Resolved: 2025-06-05

## 2025-06-05 PROBLEM — R79.89 ELEVATED TROPONIN: Status: RESOLVED | Noted: 2022-07-10 | Resolved: 2025-06-05

## 2025-06-05 LAB
ABSOLUTE EOSINOPHIL (OHS): 0.1 K/UL
ABSOLUTE MONOCYTE (OHS): 0.97 K/UL (ref 0.3–1)
ABSOLUTE NEUTROPHIL COUNT (OHS): 2.68 K/UL (ref 1.8–7.7)
ALBUMIN SERPL BCP-MCNC: 3 G/DL (ref 3.5–5.2)
ALP SERPL-CCNC: 51 UNIT/L (ref 40–150)
ALT SERPL W/O P-5'-P-CCNC: <5 UNIT/L (ref 10–44)
ANION GAP (OHS): 9 MMOL/L (ref 8–16)
AST SERPL-CCNC: 12 UNIT/L (ref 11–45)
BASOPHILS # BLD AUTO: 0.05 K/UL
BASOPHILS NFR BLD AUTO: 0.9 %
BILIRUB SERPL-MCNC: 0.4 MG/DL (ref 0.1–1)
BUN SERPL-MCNC: 61 MG/DL (ref 8–23)
CALCIUM SERPL-MCNC: 8.5 MG/DL (ref 8.7–10.5)
CHLORIDE SERPL-SCNC: 103 MMOL/L (ref 95–110)
CO2 SERPL-SCNC: 25 MMOL/L (ref 23–29)
CREAT SERPL-MCNC: 3.2 MG/DL (ref 0.5–1.4)
ERYTHROCYTE [DISTWIDTH] IN BLOOD BY AUTOMATED COUNT: 19 % (ref 11.5–14.5)
GFR SERPLBLD CREATININE-BSD FMLA CKD-EPI: 20 ML/MIN/1.73/M2
GLUCOSE SERPL-MCNC: 105 MG/DL (ref 70–110)
HCT VFR BLD AUTO: 29.4 % (ref 40–54)
HGB BLD-MCNC: 8.8 GM/DL (ref 14–18)
IMM GRANULOCYTES # BLD AUTO: 0.02 K/UL (ref 0–0.04)
IMM GRANULOCYTES NFR BLD AUTO: 0.4 % (ref 0–0.5)
LYMPHOCYTES # BLD AUTO: 1.7 K/UL (ref 1–4.8)
MAGNESIUM SERPL-MCNC: 2 MG/DL (ref 1.6–2.6)
MCH RBC QN AUTO: 22.5 PG (ref 27–31)
MCHC RBC AUTO-ENTMCNC: 29.9 G/DL (ref 32–36)
MCV RBC AUTO: 75 FL (ref 82–98)
NUCLEATED RBC (/100WBC) (OHS): 0 /100 WBC
PHOSPHATE SERPL-MCNC: 4.5 MG/DL (ref 2.7–4.5)
PLATELET # BLD AUTO: 167 K/UL (ref 150–450)
PMV BLD AUTO: ABNORMAL FL
POCT GLUCOSE: 101 MG/DL (ref 70–110)
POCT GLUCOSE: 135 MG/DL (ref 70–110)
POCT GLUCOSE: 147 MG/DL (ref 70–110)
POCT GLUCOSE: 191 MG/DL (ref 70–110)
POCT GLUCOSE: 93 MG/DL (ref 70–110)
POTASSIUM SERPL-SCNC: 3.7 MMOL/L (ref 3.5–5.1)
PROT SERPL-MCNC: 6.1 GM/DL (ref 6–8.4)
RBC # BLD AUTO: 3.91 M/UL (ref 4.6–6.2)
RELATIVE EOSINOPHIL (OHS): 1.8 %
RELATIVE LYMPHOCYTE (OHS): 30.8 % (ref 18–48)
RELATIVE MONOCYTE (OHS): 17.6 % (ref 4–15)
RELATIVE NEUTROPHIL (OHS): 48.5 % (ref 38–73)
SODIUM SERPL-SCNC: 137 MMOL/L (ref 136–145)
TACROLIMUS BLD-MCNC: 7.2 NG/ML (ref 5–15)
WBC # BLD AUTO: 5.52 K/UL (ref 3.9–12.7)

## 2025-06-05 PROCEDURE — 25000003 PHARM REV CODE 250: Mod: HCNC

## 2025-06-05 PROCEDURE — 36415 COLL VENOUS BLD VENIPUNCTURE: CPT | Mod: HCNC

## 2025-06-05 PROCEDURE — 84100 ASSAY OF PHOSPHORUS: CPT | Mod: HCNC

## 2025-06-05 PROCEDURE — 11000001 HC ACUTE MED/SURG PRIVATE ROOM: Mod: HCNC

## 2025-06-05 PROCEDURE — 63600175 PHARM REV CODE 636 W HCPCS: Mod: HCNC

## 2025-06-05 PROCEDURE — 85025 COMPLETE CBC W/AUTO DIFF WBC: CPT | Mod: HCNC

## 2025-06-05 PROCEDURE — 83735 ASSAY OF MAGNESIUM: CPT | Mod: HCNC

## 2025-06-05 PROCEDURE — 99232 SBSQ HOSP IP/OBS MODERATE 35: CPT | Mod: HCNC,,, | Performed by: NURSE PRACTITIONER

## 2025-06-05 PROCEDURE — 99233 SBSQ HOSP IP/OBS HIGH 50: CPT | Mod: HCNC,,, | Performed by: INTERNAL MEDICINE

## 2025-06-05 PROCEDURE — 80197 ASSAY OF TACROLIMUS: CPT | Mod: HCNC | Performed by: PHYSICIAN ASSISTANT

## 2025-06-05 PROCEDURE — 80053 COMPREHEN METABOLIC PANEL: CPT | Mod: HCNC

## 2025-06-05 RX ORDER — FUROSEMIDE 10 MG/ML
80 INJECTION INTRAMUSCULAR; INTRAVENOUS ONCE
Status: COMPLETED | OUTPATIENT
Start: 2025-06-05 | End: 2025-06-05

## 2025-06-05 RX ORDER — ISOSORBIDE DINITRATE 20 MG/1
20 TABLET ORAL EVERY 8 HOURS
Status: DISCONTINUED | OUTPATIENT
Start: 2025-06-05 | End: 2025-06-06

## 2025-06-05 RX ADMIN — GABAPENTIN 300 MG: 300 CAPSULE ORAL at 08:06

## 2025-06-05 RX ADMIN — HYDRALAZINE HYDROCHLORIDE 100 MG: 50 TABLET ORAL at 05:06

## 2025-06-05 RX ADMIN — DOXAZOSIN 8 MG: 8 TABLET ORAL at 08:06

## 2025-06-05 RX ADMIN — ISOSORBIDE DINITRATE 20 MG: 20 TABLET ORAL at 01:06

## 2025-06-05 RX ADMIN — RIVAROXABAN 15 MG: 15 TABLET, FILM COATED ORAL at 05:06

## 2025-06-05 RX ADMIN — TORSEMIDE 80 MG: 20 TABLET ORAL at 08:06

## 2025-06-05 RX ADMIN — SACUBITRIL AND VALSARTAN 1 TABLET: 49; 51 TABLET, FILM COATED ORAL at 08:06

## 2025-06-05 RX ADMIN — ATORVASTATIN CALCIUM 80 MG: 40 TABLET, FILM COATED ORAL at 08:06

## 2025-06-05 RX ADMIN — HYDRALAZINE HYDROCHLORIDE 100 MG: 50 TABLET ORAL at 01:06

## 2025-06-05 RX ADMIN — TACROLIMUS 1 MG: 1 CAPSULE ORAL at 05:06

## 2025-06-05 RX ADMIN — HYDRALAZINE HYDROCHLORIDE 100 MG: 50 TABLET ORAL at 09:06

## 2025-06-05 RX ADMIN — ISOSORBIDE DINITRATE 20 MG: 20 TABLET ORAL at 09:06

## 2025-06-05 RX ADMIN — TACROLIMUS 1 MG: 1 CAPSULE ORAL at 08:06

## 2025-06-05 RX ADMIN — PREDNISONE 5 MG: 5 TABLET ORAL at 08:06

## 2025-06-05 RX ADMIN — FUROSEMIDE 80 MG: 10 INJECTION, SOLUTION INTRAVENOUS at 03:06

## 2025-06-05 NOTE — PROGRESS NOTES
[facility]  Kidney Transplant  Progress Note      Reason for Follow-up: Reassessment of Kidney Transplant - 2016  (#1) recipient and management of immunosuppression.     ORGAN:   RIGHT KIDNEY    Donor Type:   Donation after Brain Death      Subjective:   History of Present Illness:  69 yr old well known to our service. ESRD secondary to DM s/p a  donor kidney transplant on 2016, AFIB on Xarelto, CHF (EF of 40-45% with mild pHTN), hx of CVA who was recently discharged on 25 after another hospitalization for SOB, cough concerning for recurrent PNA. Ruled out for TB during that admission with patient d/c on Levaquin with outpatient follow up with pulm for possible bronch if symptoms persisted.      Patient states that after discharge he felt recurrence of similar symptoms- noted to have SOB, DOSS and PND where he was not able to lie flat at night to sleep. Per wife, he was able to sleep some in reclined position. Has been taking Lasix 80 mg by mouth when he has swelling of his lower extremity. Patient otherwise denies any fever, HA, sore throat. Does admit to a nonproductive cough. Denies any N/V/diarrhea.      On admission, patient noted to be hypertensive to 200's with rate controlled AFIB. O2 sat of 95-97% on RA. CXR concerning for pulmonary edema with BNP elevated to 4,000. Patient given lasix 80 mg IV.     Interval History  Noted to have persistent volume overload.  Creatinine today 3.2, up slightly, but without significant change in GFR.  Urine output yesterday 2.45 L, with little intake recorded [question accuracy].  Tacrolimus level appears to be 13 hour trough yesterday.    Scheduled Meds:   atorvastatin  80 mg Oral Daily    [START ON 6/10/2025] cloNIDine 0.1 mg/24 hr td ptwk  1 patch Transdermal Q7 Days    doxazosin  8 mg Oral Daily    gabapentin  300 mg Oral BID    hydrALAZINE  100 mg Oral Q8H    predniSONE  5 mg Oral Daily    rivaroxaban  15 mg Oral Daily with  dinner    sacubitriL-valsartan  1 tablet Oral BID    tacrolimus  1 mg Oral BID    torsemide  80 mg Oral Daily     Continuous Infusions:   insulin aspart U-100  0.2-1 Units/hr Subcutaneous Continuous         PRN Meds:  Current Facility-Administered Medications:     dextrose 50%, 12.5 g, Intravenous, PRN    dextrose 50%, 25 g, Intravenous, PRN    glucagon (human recombinant), 1 mg, Intramuscular, PRN    glucose, 16 g, Oral, PRN    glucose, 24 g, Oral, PRN    hydrALAZINE, 10 mg, Intravenous, Q6H PRN    insulin aspart U-100, 0-10 Units, Subcutaneous, PRN    melatonin, 6 mg, Oral, Nightly PRN    naloxone, 0.02 mg, Intravenous, PRN    ondansetron, 8 mg, Oral, Q8H PRN    polyethylene glycol, 17 g, Oral, BID PRN    sodium chloride 0.9%, 10 mL, Intravenous, Q12H PRN    sodium chloride 0.9%, 10 mL, Intravenous, PRN    Intake/Output - Last 3 Shifts         06/03 0700  06/04 0659 06/04 0700  06/05 0659 06/05 0700  06/06 0659    P.O.  560     Total Intake(mL/kg)  560 (5.9)     Urine (mL/kg/hr) 3000 2450 (1.1)     Stool  0     Total Output 3000 2450     Net -3000 -1890            Unmeasured Urine Occurrence 3 x 0 x     Unmeasured Stool Occurrence  0 x              Review of Systems   Constitutional:  Negative for fever.   Respiratory:  Positive for shortness of breath (improving).    Cardiovascular:  Positive for leg swelling.   Gastrointestinal: Negative.    Genitourinary: Negative.  Negative for difficulty urinating.   Musculoskeletal: Negative.    Allergic/Immunologic: Positive for immunocompromised state.      Objective:     Vital Signs (Most Recent):  Temp: 97.5 °F (36.4 °C) (06/05/25 0804)  Pulse: 64 (06/05/25 0804)  Resp: 20 (06/05/25 0804)  BP: (!) 197/97 (06/05/25 0804)  SpO2: 96 % (06/05/25 0804) Vital Signs (24h Range):  Temp:  [97.5 °F (36.4 °C)-98.3 °F (36.8 °C)] 97.5 °F (36.4 °C)  Pulse:  [] 64  Resp:  [17-20] 20  SpO2:  [95 %-98 %] 96 %  BP: (154-197)/(82-99) 197/97     Weight: 95.1 kg (209 lb 10.5  "oz)  Height: 6' 1" (185.4 cm)  Body mass index is 27.66 kg/m².     Physical Exam  Constitutional:       General: He is not in acute distress.  Cardiovascular:      Rate and Rhythm: Normal rate.   Pulmonary:      Effort: Pulmonary effort is normal. No respiratory distress.      Breath sounds: No rales.   Abdominal:      General: Bowel sounds are normal.      Palpations: Abdomen is soft.      Tenderness: There is no abdominal tenderness.   Musculoskeletal:      Right lower leg: Edema present.      Left lower leg: Edema present.   Neurological:      Mental Status: He is oriented to person, place, and time.          Laboratory:  CBC:   Recent Labs   Lab 06/03/25  0839 06/04/25  0425 06/05/25  0609   WBC 6.72 5.32 5.52   RBC 4.11* 3.94* 3.91*   HGB 9.3* 8.7* 8.8*   HCT 32.0* 29.2* 29.4*    150 167   MCV 78* 74* 75*   MCH 22.6* 22.1* 22.5*   MCHC 29.1* 29.8* 29.9*     CMP:   Recent Labs   Lab 06/03/25  1007 06/04/25  0425 06/05/25  0609   * 91 105   CALCIUM 8.9 8.5* 8.5*   ALBUMIN 3.3* 2.9* 3.0*   PROT 6.7 6.0 6.1    136 137   K 4.3 3.7 3.7   CO2 22* 22* 25    104 103   BUN 58* 58* 61*   CREATININE 3.1* 3.1* 3.2*   ALKPHOS 58 51 51   ALT <5* <5* <5*   AST 14 13 12      Transplant Kidney With Doppler 5/7/25  Impression  Mildly elevated intraparenchymal resistive indices which may be seen with medical renal disease, rejection, or drug toxicity.    Assessment/Plan:     * Acute exacerbation of congestive heart failure  - Cont diuresis, will likely increase dose of lasix  - Recommend repeat TTE  - Rest of care per primary    Acute-on-chronic kidney injury  PHUONG superimposed on CKD IV complicating renal transplant  -creat rising slowly  -No s/s uremia thast warrant intervention  -Follow with strict I/Os, daily weights, BP (goal <140/90), daily labs.        CKD IV (severe)  -baseline creatinine mid 2s, GFR 40s  -see PHUONG      Long-term use of immunosuppressant medication  - Cont tac 1/1 mg   - Today's " "tacrolimus level of 7.2 acceptable, 12-13 hour trough  - Cont pred 5  - Cont holding cellcept; will consider resuming after antibiotics completed concern for infectious pulmonary etiologies have been excluded altogether.  - Continue to monitor prograf levels daily, monitor for toxic side effects, and adjust for therapeutic dose.        Hypertensive urgency  - On valsartan 320 mg daily, hydralazine 100 TID, and clonidine   - Okay to start on Imdur   - Still poorly controlled  - Can increase Catapres TTS-3 and/or other agent unless contraindicated by Cardiology issues   - I clarified he can not take nifedipine D/T severe gingival hyperplasia      Immunocompromised state due to drug therapy  -See prophylactic immunotherapy.   -He remains in immunosuppressed state, at risk of infections and, rejection, and toxicity.    -Monitor for side effects and toxicities, given narrow therapeutic window and significant risk of AE. Thus, ongoing monitoring is needed to assess for toxicities and other adverse effects.     Prophylactic immunotherapy  - see long term use of immunosuppression         Status post -donor kidney transplantation  - S/p KTX 16 for DMII  - See "Acute kidney injury"      Anemia of chronic disease  - H/H stable. Will continue to monitor with daily cbc.         Type 2 diabetes mellitus with stage 4 chronic kidney disease, with long-term current use of insulin          Medical decision making for this encounter includes review of pertinent labs and diagnostic studies, assessment and planning, discussions with consulting providers, discussion with patient/family, and participation in multidisciplinary rounds. Time spent caring for patient: 30 minutes    Risa Blackman MD  Kidney Transplant  [facility]    "

## 2025-06-05 NOTE — ASSESSMENT & PLAN NOTE
Creatine stable for now. BMP reviewed- noted Estimated Creatinine Clearance: 25.3 mL/min (A) (based on SCr of 3.2 mg/dL (H)). according to latest data. Based on current GFR, CKD stage is stage 4 - GFR 15-29.  Monitor UOP and serial BMP and adjust therapy as needed. Renally dose meds. Avoid nephrotoxic medications and procedures.

## 2025-06-05 NOTE — ASSESSMENT & PLAN NOTE
Plan:  - clonidine patch 0.1 mg  - doxazosin 8 mg  - hydralazine 100 mg t.i.d.  - Entresto 49-51 mg  - isosorbide dinitrate 20 mg

## 2025-06-05 NOTE — ASSESSMENT & PLAN NOTE
-See prophylactic immunotherapy.   -He remains in immunosuppressed state, at risk of infections and, rejection, and toxicity.    -Monitor for side effects and toxicities, given narrow therapeutic window and significant risk of AE. Thus, ongoing monitoring is needed to assess for toxicities and other adverse effects.

## 2025-06-05 NOTE — ASSESSMENT & PLAN NOTE
Anemia is likely due to chronic disease due to Chronic Kidney Disease. Most recent hemoglobin and hematocrit are listed below.  Recent Labs     06/03/25  0839 06/04/25  0425 06/05/25  0609   HGB 9.3* 8.7* 8.8*   HCT 32.0* 29.2* 29.4*     Plan  - Monitor serial CBC: Daily  - Transfuse PRBC if patient becomes hemodynamically unstable, symptomatic or H/H drops below 7/21.  - Patient has not received any PRBC transfusions to date  - Patient's anemia is currently stable

## 2025-06-05 NOTE — PLAN OF CARE
Patient aaox4. Patient complaints of no pain. Patient blood pressure being monitored. Patient due meds administered and patient tolerating well. Plan of care reviewed with patient and patient verbalizes understanding. Patient safety measures ongoing, call light within reach, no fall or injury noticed during this shift. No other concerns at this time.        Problem: Adult Inpatient Plan of Care  Goal: Plan of Care Review  Outcome: Progressing  Goal: Patient-Specific Goal (Individualized)  Outcome: Progressing  Goal: Absence of Hospital-Acquired Illness or Injury  Outcome: Progressing  Goal: Optimal Comfort and Wellbeing  Outcome: Progressing  Goal: Readiness for Transition of Care  Outcome: Progressing     Problem: Acute Kidney Injury/Impairment  Goal: Fluid and Electrolyte Balance  Outcome: Progressing  Goal: Improved Oral Intake  Outcome: Progressing  Goal: Effective Renal Function  Outcome: Progressing     Problem: Pneumonia  Goal: Fluid Balance  Outcome: Progressing  Goal: Resolution of Infection Signs and Symptoms  Outcome: Progressing  Goal: Effective Oxygenation and Ventilation  Outcome: Progressing     Problem: Diabetes Comorbidity  Goal: Blood Glucose Level Within Targeted Range  Outcome: Progressing

## 2025-06-05 NOTE — ASSESSMENT & PLAN NOTE
- Cont tac 1/1 mg   - Today's tacrolimus level of 7.2 acceptable, 12-13 hour trough  - Cont pred 5  - Cont holding cellcept; will consider resuming after antibiotics completed concern for infectious pulmonary etiologies have been excluded altogether.  - Continue to monitor prograf levels daily, monitor for toxic side effects, and adjust for therapeutic dose.

## 2025-06-05 NOTE — ASSESSMENT & PLAN NOTE
PHUONG superimposed on CKD IV complicating renal transplant  -creat rising slowly  -No s/s uremia thast warrant intervention  -Follow with strict I/Os, daily weights, BP (goal <140/90), daily labs.

## 2025-06-05 NOTE — PROGRESS NOTES
[facility] Medicine  Progress Note    Patient Name: Ravi Zamorano  MRN: [id number]  Patient Class: IP- Inpatient   Admission Date: 6/3/2025  Length of Stay: 1 days  Attending Physician: Devin Dubois MD  Primary Care Provider: Mima Mack MD        Subjective     Principal Problem:Acute exacerbation of congestive heart failure        HPI:  69-year-old male with a complex history including CKD s/p kidney transplant (2016, on tacrolimus), atrial fibrillation (on rivaroxaban), hypertension, hyperlipidemia, heart failure with preserved EF, and prior CVA, presents with progressively worsening shortness of breath for the past several days. He endorses orthopnea but denies paroxysmal nocturnal dyspnea, chest pain, or new palpitations. He denies recent dietary changes or non-compliance with medications. He was recently discharged following treatment for pneumonia. In the ED, he was afebrile but severely hypertensive (SBP >200 mmHg). Workup showed BNP >4000 and a troponin peak of 106. CXR revealed cardiomegaly and moderate pulmonary edema, concerning for acute on chronic CHF exacerbation. Diuresis with IV furosemide was initiated, and antihypertensives (hydralazine) restarted.    Overview/Hospital Course:  Admitted for new onset shortness of breath, started on high dose Diuretics and urine output incrreasd but BP renmainaed elevated despite the use of home Antiypertensives. KTM consulted for post kidney transplant. Considerations on addition  69M with ESRD s/p kidney transplant (2016), AF/AFL on anticoagulation, and HFmrEF presented with orthopnea, DOSS, and edema--found to have hypertensive emergency (SBP >200) with pulmonary edema on CXR and BNP ~4000. Renal function stable (Cr 3.1), no leukocytosis. Likely acute decompensated HF due to uncontrolled BP; arrhythmia, ischemia, infection, and medication changes also considered. Notably, HCTZ and eplerenone had been recently  discontinued. Started on IV diuretics with good response, though BP remained elevated. KTM consulted for post-transplant management; additional antihypertensives being considered. Telemetry continued; endocrinology looped in regarding insulin pump management. of other BP medications explored.    Interval History:  Afebrile, urine output of 2.4 L. net 1.8L.  Hemodynamically stable.  Still hypertensive.  Added on Entresto this morning along with isosorbide dinitrate.     Review of Systems  Objective:     Vital Signs (Most Recent):  Temp: 97.6 °F (36.4 °C) (06/05/25 1146)  Pulse: 74 (06/05/25 1146)  Resp: 18 (06/05/25 1146)  BP: (!) 180/84 (06/05/25 1303)  SpO2: 96 % (06/05/25 1146) Vital Signs (24h Range):  Temp:  [97.5 °F (36.4 °C)-98.3 °F (36.8 °C)] 97.6 °F (36.4 °C)  Pulse:  [] 74  Resp:  [17-20] 18  SpO2:  [95 %-98 %] 96 %  BP: (154-197)/(82-99) 180/84     Weight: 95.1 kg (209 lb 10.5 oz)  Body mass index is 27.66 kg/m².    Intake/Output Summary (Last 24 hours) at 6/5/2025 1318  Last data filed at 6/5/2025 1207  Gross per 24 hour   Intake 560 ml   Output 2700 ml   Net -2140 ml         Physical Exam  Vitals and nursing note reviewed.   Constitutional:       General: He is not in acute distress.     Appearance: Normal appearance. He is not ill-appearing or toxic-appearing.   HENT:      Head: Normocephalic and atraumatic.      Nose: Nose normal.   Eyes:      General: No scleral icterus.  Neck:      Vascular: JVD present.   Cardiovascular:      Rate and Rhythm: Normal rate and regular rhythm.      Heart sounds: Normal heart sounds. No murmur heard.     No friction rub. No gallop.   Pulmonary:      Effort: Pulmonary effort is normal. No respiratory distress.      Breath sounds: No wheezing, rhonchi or rales.   Abdominal:      General: Abdomen is flat. There is no distension.      Palpations: Abdomen is soft. There is no mass.      Tenderness: There is no abdominal tenderness. There is no guarding.      Hernia: No  hernia is present.   Musculoskeletal:         General: Normal range of motion.      Cervical back: Normal range of motion.      Right lower leg: No edema.      Left lower leg: No edema.   Skin:     General: Skin is warm.      Capillary Refill: Capillary refill takes less than 2 seconds.      Coloration: Skin is not jaundiced.      Findings: No bruising or erythema.   Neurological:      General: No focal deficit present.      Mental Status: He is alert and oriented to person, place, and time.               Significant Labs: All pertinent labs within the past 24 hours have been reviewed.  CBC:   Recent Labs   Lab 06/04/25 0425 06/05/25  0609   WBC 5.32 5.52   HGB 8.7* 8.8*   HCT 29.2* 29.4*    167     CMP:   Recent Labs   Lab 06/04/25 0425 06/05/25  0609    137   K 3.7 3.7    103   CO2 22* 25   GLU 91 105   BUN 58* 61*   CREATININE 3.1* 3.2*   CALCIUM 8.5* 8.5*   PROT 6.0 6.1   ALBUMIN 2.9* 3.0*   BILITOT 0.6 0.4   ALKPHOS 51 51   AST 13 12   ALT <5* <5*   ANIONGAP 10 9       Significant Imaging: I have reviewed all pertinent imaging results/findings within the past 24 hours.      Assessment & Plan  Acute exacerbation of congestive heart failure  Patient has Combined Systolic and Diastolic heart failure that is Acute on chronic. On presentation their CHF was decompensated. Evidence of decompensated CHF on presentation includes: edema, crackles on lung auscultation, orthopnea, and shortness of breath. The etiology of their decompensation is likely the stoppage of some of her BP and HF medications.  Most recent BNP and echo results are listed below.  Recent Labs     06/03/25  1007   BNP 4,062*     Latest ECHO  Results for orders placed during the hospital encounter of 05/22/25    Echo    Interpretation Summary    Left Ventricle: The left ventricle is normal in size. Moderately increased ventricular mass. Increased wall thickness. Mild septal thickening. There is moderate concentric hypertrophy. Mild  global hypokinesis and regional wall motion abnormalities present. See diagram for wall motion findings. There is mildly reduced systolic function with a visually estimated ejection fraction of 45 - 50%. Ejection fraction is approximately 48%. Quantitated ejection fraction is 45%. There is indeterminate diastolic function.    Right Ventricle: The right ventricle is normal in size Wall thickness is normal. Systolic function is normal.    Left Atrium: The left atrium is severely dilated    Aortic Valve: There is mild aortic valve sclerosis. There is mild to mild-moderate aortic regurgitation.    Mitral Valve: There is mild regurgitation.    Tricuspid Valve: There is mild regurgitation.    Pulmonary Artery: There is mild pulmonary hypertension. The estimated pulmonary artery systolic pressure is 46 mmHg.    IVC/SVC: Elevated venous pressure at 15 mmHg.    Current Heart Failure Medications  hydrALAZINE tablet 100 mg, Every 8 hours, Oral  hydrALAZINE injection 10 mg, Every 6 hours PRN, Intravenous  , 2 times daily, Oral  sacubitriL-valsartan 49-51 mg per tablet 1 tablet, 2 times daily, Oral  torsemide tablet 80 mg, Daily, Oral  isosorbide dinitrate tablet 20 mg, Every 8 hours, Oral    Plan  - Monitor strict I&Os and daily weights.    - Place on telemetry  - Low sodium diet  - Place on fluid restriction of 1.5 L.   - The patient's volume status is worsening as indicated by edema, crackles on lung auscultation, weight gain, orthopnea, and shortness of breath. Will continue current treatment  - IV lasix 80 mg this afternoon    Hypertensive urgency  Plan:  - clonidine patch 0.1 mg  - doxazosin 8 mg  - hydralazine 100 mg t.i.d.  - Entresto 49-51 mg  - isosorbide dinitrate 20 mg  Persistent atrial fibrillation  Patient has paroxysmal (<7 days) atrial fibrillation. Patient is currently in sinus rhythm. [id number] Score: 3. The patients heart rate in the last 24 hours is as follows:  Pulse  Min: 64  Max: 122      Antiarrhythmics       Anticoagulants  rivaroxaban tablet 15 mg, With dinner, Oral    Plan  - Replete lytes with a goal of K>4, Mg >2  - Patient is anticoagulated, see medications listed above.  - Patient's afib is currently controlled      Acute-on-chronic kidney injury   Baseline creatinine is 2.6-2.8. Most recent creatinine and eGFR are listed below.  Recent Labs     06/03/25  1007 06/04/25  0425 06/05/25  0609   CREATININE 3.1* 3.1* 3.2*   EGFRNORACEVR 21* 21* 20*      Plan  - PHUONG is worsening. Will continue current treatment  - Avoid nephrotoxins and renally dose meds for GFR listed above  - Monitor urine output, serial BMP, and adjust therapy as needed      Diabetic polyneuropathy associated with type 2 diabetes mellitus    Continue Gabapentin  Hyperlipidemia    Continue current statin therapy.  Immunocompromised state due to drug therapy  On daily Tacrolimus    KTM consulted  Will follow 48-hourly Tacro levels    Anemia of chronic disease  Anemia is likely due to chronic disease due to Chronic Kidney Disease. Most recent hemoglobin and hematocrit are listed below.  Recent Labs     06/03/25  0839 06/04/25  0425 06/05/25  0609   HGB 9.3* 8.7* 8.8*   HCT 32.0* 29.2* 29.4*     Plan  - Monitor serial CBC: Daily  - Transfuse PRBC if patient becomes hemodynamically unstable, symptomatic or H/H drops below 7/21.  - Patient has not received any PRBC transfusions to date  - Patient's anemia is currently stable    Insulin pump in place  Endocrinology consulted for Insulin adjustments while inpatient    Type 2 diabetes mellitus with stage 4 chronic kidney disease, with long-term current use of insulin  Creatine stable for now. BMP reviewed- noted Estimated Creatinine Clearance: 25.3 mL/min (A) (based on SCr of 3.2 mg/dL (H)). according to latest data. Based on current GFR, CKD stage is stage 4 - GFR 15-29.  Monitor UOP and serial BMP and adjust therapy as needed. Renally dose meds. Avoid nephrotoxic medications and  procedures.  Long-term use of immunosuppressant medication  - Cont tacro  and pred 5  - Cont holding cellcept  - Continue to monitor daily levels and monitor for toxic side effects, and adjust for therapeutic dose.     Status post -donor kidney transplantation      VTE Risk Mitigation (From admission, onward)           Ordered     rivaroxaban tablet 15 mg  With dinner         25 1421     IP VTE HIGH RISK PATIENT  Once         25 1326     Place sequential compression device  Until discontinued         25 1326     Reason for No Pharmacological VTE Prophylaxis  Once        Question:  Reasons:  Answer:  Already adequately anticoagulated on oral Anticoagulants    25 1326                    Discharge Planning   UBALDO: 2025     Code Status: Full Code   Medical Readiness for Discharge Date:   Discharge Plan A: Home with family                        Son DO Zuleyka  Department of Hospital Medicine   [facility]

## 2025-06-05 NOTE — SUBJECTIVE & OBJECTIVE
"Interval HPI:   Overnight events: No acute events overnight. Patient in room 37411/48795 A. Blood glucose stable. BG at goal on current insulin regimen (Home Insulin Pump). Steroid use- Prednisone 5 mg QD.    Renal function- Abnormal - Creatinine 3.2   Vasopressors-  None       Endocrine will continue to follow and manage insulin orders inpatient.         Diet Low Sodium (2 gm) Fluid - 1500mL     Eatin%  Nausea: No  Hypoglycemia and intervention: No  Fever: No  TPN and/or TF: No  If yes, type of TF/TPN and rate: n/a    BP (!) 197/97 (BP Location: Right arm, Patient Position: Lying)   Pulse 64   Temp 97.5 °F (36.4 °C) (Oral)   Resp 20   Ht 6' 1" (1.854 m)   Wt 95.1 kg (209 lb 10.5 oz)   SpO2 96%   BMI 27.66 kg/m²     Labs Reviewed and Include    Recent Labs   Lab 25  0609      CALCIUM 8.5*   ALBUMIN 3.0*   PROT 6.1      K 3.7   CO2 25      BUN 61*   CREATININE 3.2*   ALKPHOS 51   ALT <5*   AST 12   BILITOT 0.4     Lab Results   Component Value Date    WBC 5.52 2025    HGB 8.8 (L) 2025    HCT 29.4 (L) 2025    MCV 75 (L) 2025     2025     No results for input(s): "TSH", "FREET4" in the last 168 hours.  Lab Results   Component Value Date    HGBA1C 6.4 (H) 2025       Nutritional status:   Body mass index is 27.66 kg/m².  Lab Results   Component Value Date    ALBUMIN 3.0 (L) 2025    ALBUMIN 2.9 (L) 2025    ALBUMIN 3.3 (L) 2025     No results found for: "PREALBUMIN"    Estimated Creatinine Clearance: 25.3 mL/min (A) (based on SCr of 3.2 mg/dL (H)).    Accu-Checks  Recent Labs     25  0634 25  0731 25  1058 25  1656 25  2202 25  0208 25  0802   POCTGLUCOSE 105 107 171* 130* 114* 147* 93       Current Medications and/or Treatments Impacting Glycemic Control  Immunotherapy:    Immunosuppressants           Stop Route Frequency     tacrolimus capsule 1 mg         -- Oral 2 times daily "          Steroids:   Hormones (From admission, onward)      Start     Stop Route Frequency Ordered    06/04/25 0900  predniSONE tablet 5 mg         -- Oral Daily 06/03/25 1421    06/03/25 1424  melatonin tablet 6 mg         -- Oral Nightly PRN 06/03/25 1326          Pressors:    Autonomic Drugs (From admission, onward)      None          Hyperglycemia/Diabetes Medications:   Antihyperglycemics (From admission, onward)      Start     Stop Route Frequency Ordered    06/03/25 1745  insulin aspart U-100 insulin pump from home        Question Answer Comment   Target number 110    Basal Rate #1 0.6    Basal rate #1 time 8354-4341        -- SubQ Continuous 06/03/25 1632    06/03/25 1730  insulin aspart U-100 insulin pump from home 0-10 Units        Question Answer Comment   Target number 110    Carbohydrate coverage #1 1:7.5    Carbohydrate coverage #1 time 0062-9857    Sensitivity #1 1:40    Sensitivity #1 time [id number]        -- SubQ As needed (PRN) 06/03/25 1632

## 2025-06-05 NOTE — ASSESSMENT & PLAN NOTE
Patient has paroxysmal (<7 days) atrial fibrillation. Patient is currently in sinus rhythm. [id number] Score: 3. The patients heart rate in the last 24 hours is as follows:  Pulse  Min: 64  Max: 122     Antiarrhythmics       Anticoagulants  rivaroxaban tablet 15 mg, With dinner, Oral    Plan  - Replete lytes with a goal of K>4, Mg >2  - Patient is anticoagulated, see medications listed above.  - Patient's afib is currently controlled

## 2025-06-05 NOTE — CONSULTS
[facility]  Kidney Transplant  Consult Note    IP Consult to Kidney/Pancreas Transplant Medicine  Consult performed by: Risa Blackman MD  Consult ordered by: Devin Dubois MD        See initial progress note from txp neph from this admission, 6/3/25 by Dr. Eulalia Giron.

## 2025-06-05 NOTE — SUBJECTIVE & OBJECTIVE
Interval History:  Afebrile, urine output of 2.4 L. net 1.8L.  Hemodynamically stable.  Still hypertensive.  Added on Entresto this morning along with isosorbide dinitrate.     Review of Systems  Objective:     Vital Signs (Most Recent):  Temp: 97.6 °F (36.4 °C) (06/05/25 1146)  Pulse: 74 (06/05/25 1146)  Resp: 18 (06/05/25 1146)  BP: (!) 180/84 (06/05/25 1303)  SpO2: 96 % (06/05/25 1146) Vital Signs (24h Range):  Temp:  [97.5 °F (36.4 °C)-98.3 °F (36.8 °C)] 97.6 °F (36.4 °C)  Pulse:  [] 74  Resp:  [17-20] 18  SpO2:  [95 %-98 %] 96 %  BP: (154-197)/(82-99) 180/84     Weight: 95.1 kg (209 lb 10.5 oz)  Body mass index is 27.66 kg/m².    Intake/Output Summary (Last 24 hours) at 6/5/2025 1318  Last data filed at 6/5/2025 1207  Gross per 24 hour   Intake 560 ml   Output 2700 ml   Net -2140 ml         Physical Exam  Vitals and nursing note reviewed.   Constitutional:       General: He is not in acute distress.     Appearance: Normal appearance. He is not ill-appearing or toxic-appearing.   HENT:      Head: Normocephalic and atraumatic.      Nose: Nose normal.   Eyes:      General: No scleral icterus.  Neck:      Vascular: JVD present.   Cardiovascular:      Rate and Rhythm: Normal rate and regular rhythm.      Heart sounds: Normal heart sounds. No murmur heard.     No friction rub. No gallop.   Pulmonary:      Effort: Pulmonary effort is normal. No respiratory distress.      Breath sounds: No wheezing, rhonchi or rales.   Abdominal:      General: Abdomen is flat. There is no distension.      Palpations: Abdomen is soft. There is no mass.      Tenderness: There is no abdominal tenderness. There is no guarding.      Hernia: No hernia is present.   Musculoskeletal:         General: Normal range of motion.      Cervical back: Normal range of motion.      Right lower leg: No edema.      Left lower leg: No edema.   Skin:     General: Skin is warm.      Capillary Refill: Capillary refill takes less than 2 seconds.       Coloration: Skin is not jaundiced.      Findings: No bruising or erythema.   Neurological:      General: No focal deficit present.      Mental Status: He is alert and oriented to person, place, and time.               Significant Labs: All pertinent labs within the past 24 hours have been reviewed.  CBC:   Recent Labs   Lab 06/04/25 0425 06/05/25  0609   WBC 5.32 5.52   HGB 8.7* 8.8*   HCT 29.2* 29.4*    167     CMP:   Recent Labs   Lab 06/04/25 0425 06/05/25  0609    137   K 3.7 3.7    103   CO2 22* 25   GLU 91 105   BUN 58* 61*   CREATININE 3.1* 3.2*   CALCIUM 8.5* 8.5*   PROT 6.0 6.1   ALBUMIN 2.9* 3.0*   BILITOT 0.6 0.4   ALKPHOS 51 51   AST 13 12   ALT <5* <5*   ANIONGAP 10 9       Significant Imaging: I have reviewed all pertinent imaging results/findings within the past 24 hours.

## 2025-06-05 NOTE — PLAN OF CARE
Patient alert and oriented. Resp even and unlabored. Skin warm and dry to touch. Patient on RA. Ambulatory. Continent of bowel and bladder. Bed in low position with call light in easy reach.      Problem: Adult Inpatient Plan of Care  Goal: Plan of Care Review  Outcome: Progressing  Goal: Patient-Specific Goal (Individualized)  Outcome: Progressing  Goal: Absence of Hospital-Acquired Illness or Injury  Outcome: Progressing  Goal: Optimal Comfort and Wellbeing  Outcome: Progressing  Goal: Readiness for Transition of Care  Outcome: Progressing     Problem: Acute Kidney Injury/Impairment  Goal: Fluid and Electrolyte Balance  Outcome: Progressing  Goal: Improved Oral Intake  Outcome: Progressing  Goal: Effective Renal Function  Outcome: Progressing     Problem: Pneumonia  Goal: Fluid Balance  Outcome: Progressing  Goal: Resolution of Infection Signs and Symptoms  Outcome: Progressing  Goal: Effective Oxygenation and Ventilation  Outcome: Progressing     Problem: Diabetes Comorbidity  Goal: Blood Glucose Level Within Targeted Range  Outcome: Progressing

## 2025-06-05 NOTE — ASSESSMENT & PLAN NOTE
- On valsartan 320 mg daily, hydralazine 100 TID, and clonidine   - Okay to start on Imdur   - Still poorly controlled  - Can increase Catapres TTS-3 and/or other agent unless contraindicated by Cardiology issues   - I clarified he can not take nifedipine D/T severe gingival hyperplasia

## 2025-06-05 NOTE — SUBJECTIVE & OBJECTIVE
Subjective:   History of Present Illness:  69 yr old well known to our service. ESRD secondary to DM s/p a  donor kidney transplant on 2016, AFIB on Xarelto, CHF (EF of 40-45% with mild pHTN), hx of CVA who was recently discharged on 25 after another hospitalization for SOB, cough concerning for recurrent PNA. Ruled out for TB during that admission with patient d/c on Levaquin with outpatient follow up with pulm for possible bronch if symptoms persisted.      Patient states that after discharge he felt recurrence of similar symptoms- noted to have SOB, DOSS and PND where he was not able to lie flat at night to sleep. Per wife, he was able to sleep some in reclined position. Has been taking Lasix 80 mg by mouth when he has swelling of his lower extremity. Patient otherwise denies any fever, HA, sore throat. Does admit to a nonproductive cough. Denies any N/V/diarrhea.      On admission, patient noted to be hypertensive to 200's with rate controlled AFIB. O2 sat of 95-97% on RA. CXR concerning for pulmonary edema with BNP elevated to 4,000. Patient given lasix 80 mg IV.     Interval History  Noted to have persistent volume overload.  Creatinine today 3.2, up slightly, but without significant change in GFR.  Urine output yesterday 2.45 L, with little intake recorded [question accuracy].  Tacrolimus level appears to be 13 hour trough yesterday.    Scheduled Meds:   atorvastatin  80 mg Oral Daily    [START ON 6/10/2025] cloNIDine 0.1 mg/24 hr td ptwk  1 patch Transdermal Q7 Days    doxazosin  8 mg Oral Daily    gabapentin  300 mg Oral BID    hydrALAZINE  100 mg Oral Q8H    predniSONE  5 mg Oral Daily    rivaroxaban  15 mg Oral Daily with dinner    sacubitriL-valsartan  1 tablet Oral BID    tacrolimus  1 mg Oral BID    torsemide  80 mg Oral Daily     Continuous Infusions:   insulin aspart U-100  0.2-1 Units/hr Subcutaneous Continuous         PRN Meds:  Current Facility-Administered Medications:      "dextrose 50%, 12.5 g, Intravenous, PRN    dextrose 50%, 25 g, Intravenous, PRN    glucagon (human recombinant), 1 mg, Intramuscular, PRN    glucose, 16 g, Oral, PRN    glucose, 24 g, Oral, PRN    hydrALAZINE, 10 mg, Intravenous, Q6H PRN    insulin aspart U-100, 0-10 Units, Subcutaneous, PRN    melatonin, 6 mg, Oral, Nightly PRN    naloxone, 0.02 mg, Intravenous, PRN    ondansetron, 8 mg, Oral, Q8H PRN    polyethylene glycol, 17 g, Oral, BID PRN    sodium chloride 0.9%, 10 mL, Intravenous, Q12H PRN    sodium chloride 0.9%, 10 mL, Intravenous, PRN    Intake/Output - Last 3 Shifts         06/03 0700  06/04 0659 06/04 0700  06/05 0659 06/05 0700  06/06 0659    P.O.  560     Total Intake(mL/kg)  560 (5.9)     Urine (mL/kg/hr) 3000 2450 (1.1)     Stool  0     Total Output 3000 2450     Net -3000 -1890            Unmeasured Urine Occurrence 3 x 0 x     Unmeasured Stool Occurrence  0 x              Review of Systems   Constitutional:  Negative for fever.   Respiratory:  Positive for shortness of breath (improving).    Cardiovascular:  Positive for leg swelling.   Gastrointestinal: Negative.    Genitourinary: Negative.  Negative for difficulty urinating.   Musculoskeletal: Negative.    Allergic/Immunologic: Positive for immunocompromised state.      Objective:     Vital Signs (Most Recent):  Temp: 97.5 °F (36.4 °C) (06/05/25 0804)  Pulse: 64 (06/05/25 0804)  Resp: 20 (06/05/25 0804)  BP: (!) 197/97 (06/05/25 0804)  SpO2: 96 % (06/05/25 0804) Vital Signs (24h Range):  Temp:  [97.5 °F (36.4 °C)-98.3 °F (36.8 °C)] 97.5 °F (36.4 °C)  Pulse:  [] 64  Resp:  [17-20] 20  SpO2:  [95 %-98 %] 96 %  BP: (154-197)/(82-99) 197/97     Weight: 95.1 kg (209 lb 10.5 oz)  Height: 6' 1" (185.4 cm)  Body mass index is 27.66 kg/m².     Physical Exam  Constitutional:       General: He is not in acute distress.  Cardiovascular:      Rate and Rhythm: Normal rate.   Pulmonary:      Effort: Pulmonary effort is normal. No respiratory distress.    "   Breath sounds: No rales.   Abdominal:      General: Bowel sounds are normal.      Palpations: Abdomen is soft.      Tenderness: There is no abdominal tenderness.   Musculoskeletal:      Right lower leg: Edema present.      Left lower leg: Edema present.   Neurological:      Mental Status: He is oriented to person, place, and time.          Laboratory:  CBC:   Recent Labs   Lab 06/03/25  0839 06/04/25  0425 06/05/25  0609   WBC 6.72 5.32 5.52   RBC 4.11* 3.94* 3.91*   HGB 9.3* 8.7* 8.8*   HCT 32.0* 29.2* 29.4*    150 167   MCV 78* 74* 75*   MCH 22.6* 22.1* 22.5*   MCHC 29.1* 29.8* 29.9*     CMP:   Recent Labs   Lab 06/03/25  1007 06/04/25  0425 06/05/25  0609   * 91 105   CALCIUM 8.9 8.5* 8.5*   ALBUMIN 3.3* 2.9* 3.0*   PROT 6.7 6.0 6.1    136 137   K 4.3 3.7 3.7   CO2 22* 22* 25    104 103   BUN 58* 58* 61*   CREATININE 3.1* 3.1* 3.2*   ALKPHOS 58 51 51   ALT <5* <5* <5*   AST 14 13 12      Transplant Kidney With Doppler 5/7/25  Impression  Mildly elevated intraparenchymal resistive indices which may be seen with medical renal disease, rejection, or drug toxicity.

## 2025-06-05 NOTE — ASSESSMENT & PLAN NOTE
Patient has Combined Systolic and Diastolic heart failure that is Acute on chronic. On presentation their CHF was decompensated. Evidence of decompensated CHF on presentation includes: edema, crackles on lung auscultation, orthopnea, and shortness of breath. The etiology of their decompensation is likely the stoppage of some of her BP and HF medications.  Most recent BNP and echo results are listed below.  Recent Labs     06/03/25  1007   BNP 4,062*     Latest ECHO  Results for orders placed during the hospital encounter of 05/22/25    Echo    Interpretation Summary    Left Ventricle: The left ventricle is normal in size. Moderately increased ventricular mass. Increased wall thickness. Mild septal thickening. There is moderate concentric hypertrophy. Mild global hypokinesis and regional wall motion abnormalities present. See diagram for wall motion findings. There is mildly reduced systolic function with a visually estimated ejection fraction of 45 - 50%. Ejection fraction is approximately 48%. Quantitated ejection fraction is 45%. There is indeterminate diastolic function.    Right Ventricle: The right ventricle is normal in size Wall thickness is normal. Systolic function is normal.    Left Atrium: The left atrium is severely dilated    Aortic Valve: There is mild aortic valve sclerosis. There is mild to mild-moderate aortic regurgitation.    Mitral Valve: There is mild regurgitation.    Tricuspid Valve: There is mild regurgitation.    Pulmonary Artery: There is mild pulmonary hypertension. The estimated pulmonary artery systolic pressure is 46 mmHg.    IVC/SVC: Elevated venous pressure at 15 mmHg.    Current Heart Failure Medications  hydrALAZINE tablet 100 mg, Every 8 hours, Oral  hydrALAZINE injection 10 mg, Every 6 hours PRN, Intravenous  , 2 times daily, Oral  sacubitriL-valsartan 49-51 mg per tablet 1 tablet, 2 times daily, Oral  torsemide tablet 80 mg, Daily, Oral  isosorbide dinitrate tablet 20 mg, Every 8  hours, Oral    Plan  - Monitor strict I&Os and daily weights.    - Place on telemetry  - Low sodium diet  - Place on fluid restriction of 1.5 L.   - The patient's volume status is worsening as indicated by edema, crackles on lung auscultation, weight gain, orthopnea, and shortness of breath. Will continue current treatment  - IV lasix 80 mg this afternoon

## 2025-06-05 NOTE — PROGRESS NOTES
"[facility] - [facility]  Endocrinology  Progress Note    Admit Date: 6/3/2025     Reason for Consult: Management of T2DM, Hyperglycemia      Diabetes diagnosis year:       Home Diabetes Medications:    OMNIPOD 5  0.6 u/hr mn-0600 , 9p-mn, 0.7 u/hr 6a-9p   Target 120-130 mg/dl   Iob 3 hours  Max bolus 20 units   Max basal 2 u/hr   Isf 40 mn-mn  ICR 1 to 7.5      Presets:  Snack: 4 units (28g)  Small Meal: 8 units (56g)  Medium Meal: 12 units (90g)  Large Meal: 15 units (114g)  Super Large Meal: 18 units (130g)     How often checking glucose at home? >4 x day   BG readings on regimen: 150s  Hypoglycemia on the regimen?  No  Missed doses on regimen?  No     Diabetes Complications include:     Hyperglycemia     Complicating diabetes co morbidities:   S/p Kidney tx 2016         HPI: his is a 69-year-old male with a history of CVA, CKD status post transplant in 2016, diabetes, hypertension, atrial fibrillation presenting to the ED with shortness of breath.  Patient reports that he has had shortness of breath over the last couple of days, since his most recent discharge for pneumonia.  Reports that it has been worsening and is having trouble sleeping because when he does, states he feels like he is going to die. Endocrine consulted for BG management.     Interval HPI:   Overnight events: No acute events overnight. Patient in room 56081/50801 A. Blood glucose stable. BG at goal on current insulin regimen (Home Insulin Pump). Steroid use- Prednisone 5 mg QD.    Renal function- Abnormal - Creatinine 3.2   Vasopressors-  None       Endocrine will continue to follow and manage insulin orders inpatient.         Diet Low Sodium (2 gm) Fluid - 1500mL     Eatin%  Nausea: No  Hypoglycemia and intervention: No  Fever: No  TPN and/or TF: No  If yes, type of TF/TPN and rate: n/a    BP (!) 197/97 (BP Location: Right arm, Patient Position: Lying)   Pulse 64   Temp 97.5 °F (36.4 °C) (Oral)   Resp 20   Ht 6' 1" " "(1.854 m)   Wt 95.1 kg (209 lb 10.5 oz)   SpO2 96%   BMI 27.66 kg/m²     Labs Reviewed and Include    Recent Labs   Lab 06/05/25  0609      CALCIUM 8.5*   ALBUMIN 3.0*   PROT 6.1      K 3.7   CO2 25      BUN 61*   CREATININE 3.2*   ALKPHOS 51   ALT <5*   AST 12   BILITOT 0.4     Lab Results   Component Value Date    WBC 5.52 06/05/2025    HGB 8.8 (L) 06/05/2025    HCT 29.4 (L) 06/05/2025    MCV 75 (L) 06/05/2025     06/05/2025     No results for input(s): "TSH", "FREET4" in the last 168 hours.  Lab Results   Component Value Date    HGBA1C 6.4 (H) 06/03/2025       Nutritional status:   Body mass index is 27.66 kg/m².  Lab Results   Component Value Date    ALBUMIN 3.0 (L) 06/05/2025    ALBUMIN 2.9 (L) 06/04/2025    ALBUMIN 3.3 (L) 06/03/2025     No results found for: "PREALBUMIN"    Estimated Creatinine Clearance: 25.3 mL/min (A) (based on SCr of 3.2 mg/dL (H)).    Accu-Checks  Recent Labs     06/04/25  0634 06/04/25  0731 06/04/25  1058 06/04/25  1656 06/04/25  2202 06/05/25  0208 06/05/25  0802   POCTGLUCOSE 105 107 171* 130* 114* 147* 93       Current Medications and/or Treatments Impacting Glycemic Control  Immunotherapy:    Immunosuppressants           Stop Route Frequency     tacrolimus capsule 1 mg         -- Oral 2 times daily          Steroids:   Hormones (From admission, onward)      Start     Stop Route Frequency Ordered    06/04/25 0900  predniSONE tablet 5 mg         -- Oral Daily 06/03/25 1421    06/03/25 1424  melatonin tablet 6 mg         -- Oral Nightly PRN 06/03/25 1326          Pressors:    Autonomic Drugs (From admission, onward)      None          Hyperglycemia/Diabetes Medications:   Antihyperglycemics (From admission, onward)      Start     Stop Route Frequency Ordered    06/03/25 1745  insulin aspart U-100 insulin pump from home        Question Answer Comment   Target number 110    Basal Rate #1 0.6    Basal rate #1 time 5454-1554        -- SubQ Continuous 06/03/25 " 1632    06/03/25 1730  insulin aspart U-100 insulin pump from home 0-10 Units        Question Answer Comment   Target number 110    Carbohydrate coverage #1 1:7.5    Carbohydrate coverage #1 time 0967-3956    Sensitivity #1 1:40    Sensitivity #1 time 4633-9547        -- SubQ As needed (PRN) 06/03/25 1632            ASSESSMENT and PLAN    Cardiac/Vascular  * Acute exacerbation of congestive heart failure  Managed per primary team  Avoid hypoglycemia      Renal/  PHUONG (acute kidney injury)  Titrate insulin slowly to avoid hypoglycemia as the risk of hypoglycemia increases with decreased creatinine clearance.        Endocrine  Insulin pump in place  At time of evaluation, pt meets criteria to continue home insulin pump usage.  - Has all adequate supplies   - Insulin pump site change on 6/3/25.    - Bolus settings reviewed    - No changes to home regimen.   - Nurse to check BG qac/hs/0200 & record in epic   - Patient to input glucose into pump and use bolus wizard for prandial needs   - Will continue to monitor accuchecks and titrate insulin as clinically indicated .     - Discussed above plan with patient, patient verbalized understanding.   - Understands in case of pump malfunction or cognitive decline in which pt can no longer safely use insulin pump, will transition to [facility] MDI        If pump malfunctions or is disconnected, please call endocrine.         Type 2 diabetes mellitus with stage 4 chronic kidney disease, with long-term current use of insulin  BG goal: 140-180    - Continue Home insulin pump   - POCT Glucose before meals, at bedtime and at 2 am  - Hypoglycemia protocol in place      ** Please notify Endocrine for any change and/or advance in diet**  ** Please call Endocrine for any BG related issues **     Discharge Planning:   TBD. Please notify endocrinology prior to discharge.            Leticia Medina, NP  Endocrinology  [facility]

## 2025-06-05 NOTE — ASSESSMENT & PLAN NOTE
Baseline creatinine is 2.6-2.8. Most recent creatinine and eGFR are listed below.  Recent Labs     06/03/25  1007 06/04/25  0425 06/05/25  0609   CREATININE 3.1* 3.1* 3.2*   EGFRNORACEVR 21* 21* 20*      Plan  - PHUONG is worsening. Will continue current treatment  - Avoid nephrotoxins and renally dose meds for GFR listed above  - Monitor urine output, serial BMP, and adjust therapy as needed

## 2025-06-06 PROBLEM — D68.69 HYPERCOAGULABLE STATE DUE TO PAROXYSMAL ATRIAL FIBRILLATION: Status: ACTIVE | Noted: 2019-08-09

## 2025-06-06 PROBLEM — I48.0 HYPERCOAGULABLE STATE DUE TO PAROXYSMAL ATRIAL FIBRILLATION: Status: ACTIVE | Noted: 2019-08-09

## 2025-06-06 PROBLEM — Z79.60 LONG-TERM USE OF IMMUNOSUPPRESSANT MEDICATION: Status: ACTIVE | Noted: 2025-06-06

## 2025-06-06 PROBLEM — I50.33 ACUTE ON CHRONIC DIASTOLIC CONGESTIVE HEART FAILURE: Status: ACTIVE | Noted: 2025-06-03

## 2025-06-06 LAB
ABSOLUTE EOSINOPHIL (OHS): 0.09 K/UL
ABSOLUTE MONOCYTE (OHS): 1.03 K/UL (ref 0.3–1)
ABSOLUTE NEUTROPHIL COUNT (OHS): 2.75 K/UL (ref 1.8–7.7)
ALBUMIN SERPL BCP-MCNC: 2.9 G/DL (ref 3.5–5.2)
ALP SERPL-CCNC: 51 UNIT/L (ref 40–150)
ALT SERPL W/O P-5'-P-CCNC: <5 UNIT/L (ref 10–44)
ANION GAP (OHS): 13 MMOL/L (ref 8–16)
AST SERPL-CCNC: 16 UNIT/L (ref 11–45)
BASOPHILS # BLD AUTO: 0.08 K/UL
BASOPHILS NFR BLD AUTO: 1.4 %
BILIRUB SERPL-MCNC: 0.3 MG/DL (ref 0.1–1)
BUN SERPL-MCNC: 65 MG/DL (ref 8–23)
CALCIUM SERPL-MCNC: 8.6 MG/DL (ref 8.7–10.5)
CHLORIDE SERPL-SCNC: 103 MMOL/L (ref 95–110)
CO2 SERPL-SCNC: 21 MMOL/L (ref 23–29)
CREAT SERPL-MCNC: 3.1 MG/DL (ref 0.5–1.4)
ERYTHROCYTE [DISTWIDTH] IN BLOOD BY AUTOMATED COUNT: 19.9 % (ref 11.5–14.5)
GFR SERPLBLD CREATININE-BSD FMLA CKD-EPI: 21 ML/MIN/1.73/M2
GLUCOSE SERPL-MCNC: 99 MG/DL (ref 70–110)
HCT VFR BLD AUTO: 35.7 % (ref 40–54)
HGB BLD-MCNC: 9.7 GM/DL (ref 14–18)
IMM GRANULOCYTES # BLD AUTO: 0.02 K/UL (ref 0–0.04)
IMM GRANULOCYTES NFR BLD AUTO: 0.3 % (ref 0–0.5)
LYMPHOCYTES # BLD AUTO: 1.86 K/UL (ref 1–4.8)
MAGNESIUM SERPL-MCNC: 1.8 MG/DL (ref 1.6–2.6)
MCH RBC QN AUTO: 22.4 PG (ref 27–31)
MCHC RBC AUTO-ENTMCNC: 27.2 G/DL (ref 32–36)
MCV RBC AUTO: 82 FL (ref 82–98)
NUCLEATED RBC (/100WBC) (OHS): 0 /100 WBC
OHS QRS DURATION: 108 MS
OHS QTC CALCULATION: 499 MS
PHOSPHATE SERPL-MCNC: 4.8 MG/DL (ref 2.7–4.5)
PLATELET # BLD AUTO: 190 K/UL (ref 150–450)
PMV BLD AUTO: ABNORMAL FL
POCT GLUCOSE: 120 MG/DL (ref 70–110)
POCT GLUCOSE: 121 MG/DL (ref 70–110)
POCT GLUCOSE: 121 MG/DL (ref 70–110)
POCT GLUCOSE: 123 MG/DL (ref 70–110)
POCT GLUCOSE: 154 MG/DL (ref 70–110)
POTASSIUM SERPL-SCNC: 3.6 MMOL/L (ref 3.5–5.1)
PROT SERPL-MCNC: 6.1 GM/DL (ref 6–8.4)
RBC # BLD AUTO: 4.34 M/UL (ref 4.6–6.2)
RELATIVE EOSINOPHIL (OHS): 1.5 %
RELATIVE LYMPHOCYTE (OHS): 31.9 % (ref 18–48)
RELATIVE MONOCYTE (OHS): 17.7 % (ref 4–15)
RELATIVE NEUTROPHIL (OHS): 47.2 % (ref 38–73)
SODIUM SERPL-SCNC: 137 MMOL/L (ref 136–145)
TACROLIMUS BLD-MCNC: 7.1 NG/ML (ref 5–15)
WBC # BLD AUTO: 5.83 K/UL (ref 3.9–12.7)

## 2025-06-06 PROCEDURE — 85025 COMPLETE CBC W/AUTO DIFF WBC: CPT | Mod: HCNC

## 2025-06-06 PROCEDURE — 63600175 PHARM REV CODE 636 W HCPCS: Mod: HCNC

## 2025-06-06 PROCEDURE — 36415 COLL VENOUS BLD VENIPUNCTURE: CPT | Mod: HCNC

## 2025-06-06 PROCEDURE — 25000003 PHARM REV CODE 250: Mod: HCNC

## 2025-06-06 PROCEDURE — 84100 ASSAY OF PHOSPHORUS: CPT | Mod: HCNC

## 2025-06-06 PROCEDURE — 83735 ASSAY OF MAGNESIUM: CPT | Mod: HCNC

## 2025-06-06 PROCEDURE — 99232 SBSQ HOSP IP/OBS MODERATE 35: CPT | Mod: HCNC,,, | Performed by: NURSE PRACTITIONER

## 2025-06-06 PROCEDURE — 11000001 HC ACUTE MED/SURG PRIVATE ROOM: Mod: HCNC

## 2025-06-06 PROCEDURE — 25000003 PHARM REV CODE 250: Mod: HCNC | Performed by: HOSPITALIST

## 2025-06-06 PROCEDURE — 80197 ASSAY OF TACROLIMUS: CPT | Mod: HCNC | Performed by: PHYSICIAN ASSISTANT

## 2025-06-06 PROCEDURE — 93005 ELECTROCARDIOGRAM TRACING: CPT | Mod: HCNC

## 2025-06-06 PROCEDURE — 99232 SBSQ HOSP IP/OBS MODERATE 35: CPT | Mod: HCNC,,, | Performed by: INTERNAL MEDICINE

## 2025-06-06 PROCEDURE — 93010 ELECTROCARDIOGRAM REPORT: CPT | Mod: HCNC,,, | Performed by: INTERNAL MEDICINE

## 2025-06-06 PROCEDURE — 80053 COMPREHEN METABOLIC PANEL: CPT | Mod: HCNC

## 2025-06-06 RX ORDER — METOPROLOL TARTRATE 25 MG/1
25 TABLET, FILM COATED ORAL 2 TIMES DAILY
Status: DISCONTINUED | OUTPATIENT
Start: 2025-06-06 | End: 2025-06-07 | Stop reason: HOSPADM

## 2025-06-06 RX ORDER — MAGNESIUM SULFATE 1 G/100ML
1 INJECTION INTRAVENOUS ONCE
Status: COMPLETED | OUTPATIENT
Start: 2025-06-06 | End: 2025-06-06

## 2025-06-06 RX ORDER — METOPROLOL TARTRATE 25 MG/1
25 TABLET, FILM COATED ORAL 3 TIMES DAILY
Status: DISCONTINUED | OUTPATIENT
Start: 2025-06-06 | End: 2025-06-06

## 2025-06-06 RX ORDER — HYDRALAZINE HYDROCHLORIDE 50 MG/1
50 TABLET, FILM COATED ORAL EVERY 8 HOURS
Status: DISCONTINUED | OUTPATIENT
Start: 2025-06-06 | End: 2025-06-06

## 2025-06-06 RX ORDER — HYDRALAZINE HYDROCHLORIDE 50 MG/1
100 TABLET, FILM COATED ORAL EVERY 8 HOURS
Status: DISCONTINUED | OUTPATIENT
Start: 2025-06-06 | End: 2025-06-06

## 2025-06-06 RX ADMIN — ATORVASTATIN CALCIUM 80 MG: 40 TABLET, FILM COATED ORAL at 08:06

## 2025-06-06 RX ADMIN — METOPROLOL TARTRATE 25 MG: 25 TABLET, FILM COATED ORAL at 01:06

## 2025-06-06 RX ADMIN — GABAPENTIN 300 MG: 300 CAPSULE ORAL at 08:06

## 2025-06-06 RX ADMIN — TACROLIMUS 1 MG: 1 CAPSULE ORAL at 06:06

## 2025-06-06 RX ADMIN — DOXAZOSIN 8 MG: 8 TABLET ORAL at 08:06

## 2025-06-06 RX ADMIN — RIVAROXABAN 15 MG: 15 TABLET, FILM COATED ORAL at 06:06

## 2025-06-06 RX ADMIN — TACROLIMUS 1 MG: 1 CAPSULE ORAL at 08:06

## 2025-06-06 RX ADMIN — HYDRALAZINE HYDROCHLORIDE: 25 TABLET ORAL at 08:06

## 2025-06-06 RX ADMIN — MAGNESIUM SULFATE HEPTAHYDRATE 1 G: 500 INJECTION, SOLUTION INTRAMUSCULAR; INTRAVENOUS at 10:06

## 2025-06-06 RX ADMIN — SACUBITRIL AND VALSARTAN 1 TABLET: 49; 51 TABLET, FILM COATED ORAL at 08:06

## 2025-06-06 RX ADMIN — POTASSIUM BICARBONATE 40 MEQ: 391 TABLET, EFFERVESCENT ORAL at 08:06

## 2025-06-06 RX ADMIN — PREDNISONE 5 MG: 5 TABLET ORAL at 08:06

## 2025-06-06 RX ADMIN — ISOSORBIDE DINITRATE 20 MG: 20 TABLET ORAL at 06:06

## 2025-06-06 RX ADMIN — METOPROLOL TARTRATE 25 MG: 25 TABLET, FILM COATED ORAL at 08:06

## 2025-06-06 RX ADMIN — HYDRALAZINE HYDROCHLORIDE 100 MG: 50 TABLET ORAL at 06:06

## 2025-06-06 NOTE — SUBJECTIVE & OBJECTIVE
"Interval HPI:   Overnight events: No acute events overnight. Patient in room 97010/33724 A. Blood glucose stable. BG at goal on current insulin regimen (Home Insulin Pump). Steroid use- Prednisone 5 mg QD.    Renal function- Abnormal - Creatinine 3.1    Vasopressors-  None       Endocrine will continue to follow and manage insulin orders inpatient.         Diet Low Sodium (2 gm) Fluid - 1500mL     Eatin%  Nausea: No  Hypoglycemia and intervention: No  Fever: No  TPN and/or TF: No  If yes, type of TF/TPN and rate: n/a    /70   Pulse (!) 130   Temp 97.5 °F (36.4 °C) (Oral)   Resp 20   Ht 6' 1" (1.854 m)   Wt 93.6 kg (206 lb 5.6 oz)   SpO2 95%   BMI 27.22 kg/m²     Labs Reviewed and Include    Recent Labs   Lab 25  0439   GLU 99   CALCIUM 8.6*   ALBUMIN 2.9*   PROT 6.1      K 3.6   CO2 21*      BUN 65*   CREATININE 3.1*   ALKPHOS 51   ALT <5*   AST 16   BILITOT 0.3     Lab Results   Component Value Date    WBC 5.83 2025    HGB 9.7 (L) 2025    HCT 35.7 (L) 2025    MCV 82 2025     2025     No results for input(s): "TSH", "FREET4" in the last 168 hours.  Lab Results   Component Value Date    HGBA1C 6.4 (H) 2025       Nutritional status:   Body mass index is 27.22 kg/m².  Lab Results   Component Value Date    ALBUMIN 2.9 (L) 2025    ALBUMIN 3.0 (L) 2025    ALBUMIN 2.9 (L) 2025     No results found for: "PREALBUMIN"    Estimated Creatinine Clearance: 26.1 mL/min (A) (based on SCr of 3.1 mg/dL (H)).    Accu-Checks  Recent Labs     25  1058 25  1656 25  2202 25  0208 25  0802 25  1144 25  1605 25  2105 25  0202 25  0759   POCTGLUCOSE 171* 130* 114* 147* 93 135* 191* 101 123* 121*       Current Medications and/or Treatments Impacting Glycemic Control  Immunotherapy:    Immunosuppressants           Stop Route Frequency     tacrolimus capsule 1 mg         -- Oral 2 times " daily          Steroids:   Hormones (From admission, onward)      Start     Stop Route Frequency Ordered    06/04/25 0900  predniSONE tablet 5 mg         -- Oral Daily 06/03/25 1421    06/03/25 1424  melatonin tablet 6 mg         -- Oral Nightly PRN 06/03/25 1326          Pressors:    Autonomic Drugs (From admission, onward)      None          Hyperglycemia/Diabetes Medications:   Antihyperglycemics (From admission, onward)      Start     Stop Route Frequency Ordered    06/03/25 1745  insulin aspart U-100 insulin pump from home        Question Answer Comment   Target number 110    Basal Rate #1 0.6    Basal rate #1 time 3501-5312        -- SubQ Continuous 06/03/25 1632    06/03/25 1730  insulin aspart U-100 insulin pump from home 0-10 Units        Question Answer Comment   Target number 110    Carbohydrate coverage #1 1:7.5    Carbohydrate coverage #1 time 2475-8815    Sensitivity #1 1:40    Sensitivity #1 time 0450-5070        -- SubQ As needed (PRN) 06/03/25 1632

## 2025-06-06 NOTE — ASSESSMENT & PLAN NOTE
"Patient has Combined Systolic and Diastolic heart failure that is Acute on chronic. On presentation their CHF was decompensated. Evidence of decompensated CHF on presentation includes: edema, crackles on lung auscultation, orthopnea, and shortness of breath. The etiology of their decompensation is likely the stoppage of some of her BP and HF medications.  Most recent BNP and echo results are listed below.  No results for input(s): "BNP" in the last 72 hours.    Latest ECHO  Results for orders placed during the hospital encounter of 05/22/25    Echo    Interpretation Summary    Left Ventricle: The left ventricle is normal in size. Moderately increased ventricular mass. Increased wall thickness. Mild septal thickening. There is moderate concentric hypertrophy. Mild global hypokinesis and regional wall motion abnormalities present. See diagram for wall motion findings. There is mildly reduced systolic function with a visually estimated ejection fraction of 45 - 50%. Ejection fraction is approximately 48%. Quantitated ejection fraction is 45%. There is indeterminate diastolic function.    Right Ventricle: The right ventricle is normal in size Wall thickness is normal. Systolic function is normal.    Left Atrium: The left atrium is severely dilated    Aortic Valve: There is mild aortic valve sclerosis. There is mild to mild-moderate aortic regurgitation.    Mitral Valve: There is mild regurgitation.    Tricuspid Valve: There is mild regurgitation.    Pulmonary Artery: There is mild pulmonary hypertension. The estimated pulmonary artery systolic pressure is 46 mmHg.    IVC/SVC: Elevated venous pressure at 15 mmHg.    Current Heart Failure Medications  hydrALAZINE injection 10 mg, Every 6 hours PRN, Intravenous  , 2 times daily, Oral  sacubitriL-valsartan 49-51 mg per tablet 1 tablet, 2 times daily, Oral  hydrALAZINE tablet 100 mg, Every 8 hours, Oral    Plan  - Monitor strict I&Os and daily weights.    - Place on " telemetry  - Low sodium diet  - Place on fluid restriction of 1.5 L.   - The patient's volume status is worsening as indicated by edema, crackles on lung auscultation, weight gain, orthopnea, and shortness of breath. Will continue current treatment  - IV lasix 80 mg to be transitioned to Torsemide prior to discharge

## 2025-06-06 NOTE — PLAN OF CARE
Alert and oriented x 4. Speech is clear. Vss. No c/o pain or sign of distress. Whiteboard updated. Safety measures in place.       Problem: Adult Inpatient Plan of Care  Goal: Plan of Care Review  Outcome: Progressing  Goal: Patient-Specific Goal (Individualized)  Outcome: Progressing  Goal: Absence of Hospital-Acquired Illness or Injury  Outcome: Progressing  Goal: Optimal Comfort and Wellbeing  Outcome: Progressing     Problem: Acute Kidney Injury/Impairment  Goal: Fluid and Electrolyte Balance  Outcome: Progressing  Goal: Improved Oral Intake  Outcome: Progressing  Goal: Effective Renal Function  Outcome: Progressing     Problem: Pneumonia  Goal: Fluid Balance  Outcome: Progressing  Goal: Resolution of Infection Signs and Symptoms  Outcome: Progressing

## 2025-06-06 NOTE — ASSESSMENT & PLAN NOTE
Anemia is likely due to chronic disease due to Chronic Kidney Disease. Most recent hemoglobin and hematocrit are listed below.  Recent Labs     06/04/25  0425 06/05/25  0609 06/06/25  0439   HGB 8.7* 8.8* 9.7*   HCT 29.2* 29.4* 35.7*     Plan  - Monitor serial CBC: Daily  - Transfuse PRBC if patient becomes hemodynamically unstable, symptomatic or H/H drops below 7/21.  - Patient has not received any PRBC transfusions to date  - Patient's anemia is currently stable

## 2025-06-06 NOTE — ASSESSMENT & PLAN NOTE
Patient has paroxysmal (<7 days) atrial fibrillation. Patient is currently in sinus rhythm. [id number] Score: 3. The patients heart rate in the last 24 hours is as follows:  Pulse  Min: 63  Max: 130     Antiarrhythmics  metoprolol tartrate (LOPRESSOR) tablet 25 mg, 3 times daily, Oral    Anticoagulants  rivaroxaban tablet 15 mg, With dinner, Oral    Plan  - Replete lytes with a goal of K>4, Mg >2  - Patient is anticoagulated, see medications listed above.  - Patient's afib is currently controlled

## 2025-06-06 NOTE — ASSESSMENT & PLAN NOTE
Patient's most recent potassium results are listed below.   Recent Labs     06/04/25  0425 06/05/25  0609 06/06/25  0439   K 3.7 3.7 3.6     Plan  - Replete potassium per protocol  - Monitor potassium Daily  - Patient's hypokalemia is stable

## 2025-06-06 NOTE — SUBJECTIVE & OBJECTIVE
Interval Update: BP controlled with Entresto. Few tachycardic runs. Will restart Metoprolol and stop Nitrate.     Will observe patient for 24 hours and possibly discharge tomorrow. Patient is otherwise stable. Will transition to oral Torsemide prior to discharge per [facility] recs.    Review of Systems   Constitutional:  Negative for chills and fever.   Respiratory:  Shortness of breath: improved.    Cardiovascular:  Negative for chest pain.   Gastrointestinal:  Negative for constipation, diarrhea, nausea and vomiting.   Endocrine: Negative for polydipsia and polyuria.   Genitourinary:  Negative for decreased urine volume and difficulty urinating.   Allergic/Immunologic: Positive for immunocompromised state.   Psychiatric/Behavioral:  Negative for behavioral problems and confusion.      Objective:     Vital Signs (Most Recent):  Temp: 97.9 °F (36.6 °C) (06/06/25 1157)  Pulse: (!) 128 (06/06/25 1339)  Resp: 18 (06/06/25 1157)  BP: (!) 101/59 (06/06/25 1303)  SpO2: 95 % (06/06/25 1157) Vital Signs (24h Range):  Temp:  [97.3 °F (36.3 °C)-97.9 °F (36.6 °C)] 97.9 °F (36.6 °C)  Pulse:  [] 128  Resp:  [18-20] 18  SpO2:  [94 %-98 %] 95 %  BP: (101-160)/(59-86) 101/59     Weight: 93.6 kg (206 lb 5.6 oz)  Body mass index is 27.22 kg/m².    Intake/Output Summary (Last 24 hours) at 6/6/2025 1354  Last data filed at 6/6/2025 0801  Gross per 24 hour   Intake 480 ml   Output 3900 ml   Net -3420 ml         Physical Exam  Constitutional:       General: He is not in acute distress.     Appearance: Normal appearance. He is not ill-appearing.   HENT:      Head: Normocephalic and atraumatic.      Nose: No congestion.   Eyes:      Extraocular Movements: Extraocular movements intact.      Conjunctiva/sclera: Conjunctivae normal.   Cardiovascular:      Rate and Rhythm: Normal rate. Rhythm irregular.      Pulses: Normal pulses.      Heart sounds: Murmur heard.   Pulmonary:      Effort: Pulmonary effort is normal. No respiratory distress.       Breath sounds: Examination of the right-lower field reveals rales. Examination of the left-lower field reveals rales. Rales (mild and improving) present. No rhonchi (resolved).   Abdominal:      General: There is no distension.      Palpations: Abdomen is soft.      Tenderness: There is no abdominal tenderness.   Musculoskeletal:      Right lower leg: No edema (minimal).      Left lower leg: No edema.   Skin:     General: Skin is warm.      Coloration: Skin is not pale.   Neurological:      General: No focal deficit present.      Mental Status: He is alert and oriented to person, place, and time.   Psychiatric:         Mood and Affect: Mood normal.         Thought Content: Thought content normal.               Significant Labs: All pertinent labs within the past 24 hours have been reviewed.  CBC:   Recent Labs   Lab 06/05/25  0609 06/06/25  0439   WBC 5.52 5.83   HGB 8.8* 9.7*   HCT 29.4* 35.7*    190     CMP:   Recent Labs   Lab 06/05/25  0609 06/06/25  0439    137   K 3.7 3.6    103   CO2 25 21*    99   BUN 61* 65*   CREATININE 3.2* 3.1*   CALCIUM 8.5* 8.6*   PROT 6.1 6.1   ALBUMIN 3.0* 2.9*   BILITOT 0.4 0.3   ALKPHOS 51 51   AST 12 16   ALT <5* <5*   ANIONGAP 9 13       Significant Imaging: I have reviewed all pertinent imaging results/findings within the past 24 hours.

## 2025-06-06 NOTE — ASSESSMENT & PLAN NOTE
Plan:  - clonidine patch 0.1 mg discontinued  - doxazosin 8 mg  - hydralazine 100 mg t.i.d.  - Entresto 49-51 mg  - isosorbide dinitrate 20 mg discontinued  - Patient started on Metoprolol

## 2025-06-06 NOTE — PROGRESS NOTES
"[facility]  Endocrinology  Progress Note    Admit Date: 6/3/2025     Reason for Consult: Management of T2DM, Hyperglycemia      Diabetes diagnosis year:       Home Diabetes Medications:    OMNIPOD 5  0.6 u/hr mn-0600 , 9p-mn, 0.7 u/hr 6a-9p   Target 120-130 mg/dl   Iob 3 hours  Max bolus 20 units   Max basal 2 u/hr   Isf 40 mn-mn  ICR 1 to 7.5      Presets:  Snack: 4 units (28g)  Small Meal: 8 units (56g)  Medium Meal: 12 units (90g)  Large Meal: 15 units (114g)  Super Large Meal: 18 units (130g)     How often checking glucose at home? >4 x day   BG readings on regimen: 150s  Hypoglycemia on the regimen?  No  Missed doses on regimen?  No     Diabetes Complications include:     Hyperglycemia     Complicating diabetes co morbidities:   S/p Kidney tx 2016         HPI: his is a 69-year-old male with a history of CVA, CKD status post transplant in 2016, diabetes, hypertension, atrial fibrillation presenting to the ED with shortness of breath.  Patient reports that he has had shortness of breath over the last couple of days, since his most recent discharge for pneumonia.  Reports that it has been worsening and is having trouble sleeping because when he does, states he feels like he is going to die. Endocrine consulted for BG management.     Interval HPI:   Overnight events: No acute events overnight. Patient in room 42512/43549 A. Blood glucose stable. BG at goal on current insulin regimen (Home Insulin Pump). Steroid use- Prednisone 5 mg QD.    Renal function- Abnormal - Creatinine 3.1    Vasopressors-  None       Endocrine will continue to follow and manage insulin orders inpatient.         Diet Low Sodium (2 gm) Fluid - 1500mL     Eatin%  Nausea: No  Hypoglycemia and intervention: No  Fever: No  TPN and/or TF: No  If yes, type of TF/TPN and rate: n/a    /70   Pulse (!) 130   Temp 97.5 °F (36.4 °C) (Oral)   Resp 20   Ht 6' 1" (1.854 m)   Wt 93.6 kg (206 lb 5.6 oz)   SpO2 95% " "  BMI 27.22 kg/m²     Labs Reviewed and Include    Recent Labs   Lab 06/06/25  0439   GLU 99   CALCIUM 8.6*   ALBUMIN 2.9*   PROT 6.1      K 3.6   CO2 21*      BUN 65*   CREATININE 3.1*   ALKPHOS 51   ALT <5*   AST 16   BILITOT 0.3     Lab Results   Component Value Date    WBC 5.83 06/06/2025    HGB 9.7 (L) 06/06/2025    HCT 35.7 (L) 06/06/2025    MCV 82 06/06/2025     06/06/2025     No results for input(s): "TSH", "FREET4" in the last 168 hours.  Lab Results   Component Value Date    HGBA1C 6.4 (H) 06/03/2025       Nutritional status:   Body mass index is 27.22 kg/m².  Lab Results   Component Value Date    ALBUMIN 2.9 (L) 06/06/2025    ALBUMIN 3.0 (L) 06/05/2025    ALBUMIN 2.9 (L) 06/04/2025     No results found for: "PREALBUMIN"    Estimated Creatinine Clearance: 26.1 mL/min (A) (based on SCr of 3.1 mg/dL (H)).    Accu-Checks  Recent Labs     06/04/25  1058 06/04/25  1656 06/04/25  2202 06/05/25  0208 06/05/25  0802 06/05/25  1144 06/05/25  1605 06/05/25  2105 06/06/25  0202 06/06/25  0759   POCTGLUCOSE 171* 130* 114* 147* 93 135* 191* 101 123* 121*       Current Medications and/or Treatments Impacting Glycemic Control  Immunotherapy:    Immunosuppressants           Stop Route Frequency     tacrolimus capsule 1 mg         -- Oral 2 times daily          Steroids:   Hormones (From admission, onward)      Start     Stop Route Frequency Ordered    06/04/25 0900  predniSONE tablet 5 mg         -- Oral Daily 06/03/25 1421    06/03/25 1424  melatonin tablet 6 mg         -- Oral Nightly PRN 06/03/25 1326          Pressors:    Autonomic Drugs (From admission, onward)      None          Hyperglycemia/Diabetes Medications:   Antihyperglycemics (From admission, onward)      Start     Stop Route Frequency Ordered    06/03/25 1745  insulin aspart U-100 insulin pump from home        Question Answer Comment   Target number 110    Basal Rate #1 0.6    Basal rate #1 time 8246-3329        -- SubQ Continuous " 06/03/25 1632    06/03/25 1730  insulin aspart U-100 insulin pump from home 0-10 Units        Question Answer Comment   Target number 110    Carbohydrate coverage #1 1:7.5    Carbohydrate coverage #1 time 5630-2581    Sensitivity #1 1:40    Sensitivity #1 time 3505-1245        -- SubQ As needed (PRN) 06/03/25 1632            ASSESSMENT and PLAN    Cardiac/Vascular  * Acute exacerbation of congestive heart failure  Managed per primary team  Avoid hypoglycemia      Endocrine  Insulin pump in place  At time of evaluation, pt meets criteria to continue home insulin pump usage.  - Has all adequate supplies   - Insulin pump site change on 6/3/25.    - Bolus settings reviewed    - No changes to home regimen.   - Nurse to check BG qac/hs/0200 & record in epic   - Patient to input glucose into pump and use bolus wizard for prandial needs   - Will continue to monitor accuchecks and titrate insulin as clinically indicated .     - Discussed above plan with patient, patient verbalized understanding.   - Understands in case of pump malfunction or cognitive decline in which pt can no longer safely use insulin pump, will transition to SC MDI        If pump malfunctions or is disconnected, please call endocrine.         Type 2 diabetes mellitus with stage 4 chronic kidney disease, with long-term current use of insulin  BG goal: 140-180    - Continue Home insulin pump   - POCT Glucose before meals, at bedtime and at 2 am  - Hypoglycemia protocol in place      ** Please notify Endocrine for any change and/or advance in diet**  ** Please call Endocrine for any BG related issues **     Discharge Planning:   TBD. Please notify endocrinology prior to discharge.            Leticia Medina, NP  Endocrinology  [facility]

## 2025-06-06 NOTE — PLAN OF CARE
Patient is alert and oriented x4 , room air, ambulates to the rest room. Denies chest pain and shortness of breath. Point of focus is Bp. Possible discharge today  Care on going  Problem: Adult Inpatient Plan of Care  Goal: Plan of Care Review  Outcome: Progressing  Goal: Patient-Specific Goal (Individualized)  Outcome: Progressing  Goal: Absence of Hospital-Acquired Illness or Injury  Outcome: Progressing  Goal: Optimal Comfort and Wellbeing  Outcome: Progressing  Goal: Readiness for Transition of Care  Outcome: Met     Problem: Acute Kidney Injury/Impairment  Goal: Improved Oral Intake  Outcome: Progressing     Problem: Pneumonia  Goal: Fluid Balance  Outcome: Progressing  Goal: Effective Oxygenation and Ventilation  Outcome: Met     Problem: Fatigue  Goal: Improved Activity Tolerance  Outcome: Met     Problem: Comorbidity Management  Goal: Maintenance of Asthma Control  Outcome: Progressing  Goal: Maintenance of Behavioral Health Symptom Control  Outcome: Progressing  Goal: Maintenance of Heart Failure Symptom Control  Outcome: Progressing  Goal: Blood Pressure in Desired Range  Outcome: Progressing

## 2025-06-06 NOTE — CARE UPDATE
"RAPID RESPONSE NURSE CHART REVIEW        Chart Reviewed: 06/06/2025, 9:52 AM    MRN: [id number]  Bed: 15388/12289 A    Dx: Acute exacerbation of congestive heart failure    Ravi Zamorano has a past medical history of Anxiety, Arthritis, atrial fibrillation, Bilateral diabetic retinopathy, Cardioembolic stroke, CKD (chronic kidney disease) stage 4, GFR 15-29 ml/min, Colon polyps, Depression - situational, Diabetes type 2 since 2000, Diabetic neuropathy, Encounter for blood transfusion, History of cardioversion, History of hepatitis C, s/p successful Rx w/ SVR24 - 9/2017, Hyperlipidemia, Hypertension, Neuropathy, Organ transplant candidate, Pancreatitis, S/P cadaveric kidney transplant 11/5/2016. ESRD secondary to HTN/DMII, and Type 2 diabetes mellitus with stage 3a chronic kidney disease, with long-term current use of insulin.    Last VS: /70   Pulse (!) 130   Temp 97.5 °F (36.4 °C) (Oral)   Resp 20   Ht 6' 1" (1.854 m)   Wt 93.6 kg (206 lb 5.6 oz)   SpO2 95%   BMI 27.22 kg/m²     24H Vital Sign Range:  Temp:  [97.3 °F (36.3 °C)-97.8 °F (36.6 °C)]   Pulse:  []   Resp:  [18-20]   BP: (108-185)/(70-86)   SpO2:  [94 %-98 %]     Level of Consciousness (AVPU): alert    Recent Labs     06/04/25  0425 06/05/25  0609 06/06/25  0439   WBC 5.32 5.52 5.83   HGB 8.7* 8.8* 9.7*   HCT 29.2* 29.4* 35.7*    167 190       Recent Labs     06/04/25  0425 06/05/25  0609 06/06/25  0439    137 137   K 3.7 3.7 3.6    103 103   CO2 22* 25 21*   BUN 58* 61* 65*   CREATININE 3.1* 3.2* 3.1*   GLU 91 105 99   PHOS 4.0 4.5 4.8*   MG 2.1 2.0 1.8      MEWS score: 4    Rounding completed with charge nurse Magda for tachycardia reports pt stable in no acute distress at this time,. No additional concerns verbalized at this time. Instructed to call [phone] for further concerns or assistance.    Devin Wu RN        "

## 2025-06-06 NOTE — ASSESSMENT & PLAN NOTE
Baseline creatinine is 2.6-2.8. Most recent creatinine and eGFR are listed below.  Recent Labs     06/04/25  0425 06/05/25  0609 06/06/25  0439   CREATININE 3.1* 3.2* 3.1*   EGFRNORACEVR 21* 20* 21*      Plan  - PHUONG is stable. Will continue current treatment  - Avoid nephrotoxins and renally dose meds for GFR listed above  - Monitor urine output, serial BMP, and adjust therapy as needed

## 2025-06-06 NOTE — PROGRESS NOTES
[facility] Medicine  Progress Note    Patient Name: Ravi Zamorano  MRN: [id number]  Patient Class: IP- Inpatient   Admission Date: 6/3/2025  Length of Stay: 2 days  Attending Physician: Darwin Mcdaniel MD  Primary Care Provider: Mima Mack MD        Subjective     Principal Problem:Acute on chronic diastolic congestive heart failure        HPI:  69-year-old male with a complex history including CKD s/p kidney transplant (2016, on tacrolimus), atrial fibrillation (on rivaroxaban), hypertension, hyperlipidemia, heart failure with preserved EF, and prior CVA, presents with progressively worsening shortness of breath for the past several days. He endorses orthopnea but denies paroxysmal nocturnal dyspnea, chest pain, or new palpitations. He denies recent dietary changes or non-compliance with medications. He was recently discharged following treatment for pneumonia. In the ED, he was afebrile but severely hypertensive (SBP >200 mmHg). Workup showed BNP >4000 and a troponin peak of 106. CXR revealed cardiomegaly and moderate pulmonary edema, concerning for acute on chronic CHF exacerbation. Diuresis with IV furosemide was initiated, and antihypertensives (hydralazine) restarted.    Overview/Hospital Course:  Admitted for new onset shortness of breath, started on high dose Diuretics and urine output incrreasd but BP renmainaed elevated despite the use of home Antiypertensives. KTM consulted for post kidney transplant. Considerations on addition  69M with ESRD s/p kidney transplant (2016), AF/AFL on anticoagulation, and HFmrEF presented with orthopnea, DSOS, and edema--found to have hypertensive emergency (SBP >200) with pulmonary edema on CXR and BNP ~4000. Renal function stable (Cr 3.1), no leukocytosis. Likely acute decompensated HF due to uncontrolled BP; arrhythmia, ischemia, infection, and medication changes also considered. Notably, HCTZ and eplerenone had been  recently discontinued. Started on IV diuretics with good response, though BP remained elevated. KTM consulted for post-transplant management; additional antihypertensives being considered. Telemetry continued; endocrinology looped in regarding insulin pump management.     Interval Update: BP controlled with Entresto. Few tachycardic runs. Will restart Metoprolol and stop Nitrate.     Will observe patient for 24 hours and possibly discharge tomorrow. Patient is otherwise stable. Will transition to oral Torsemide prior to discharge per KTM recs.    Review of Systems   Constitutional:  Negative for chills and fever.   Respiratory:  Shortness of breath: improved.    Cardiovascular:  Negative for chest pain.   Gastrointestinal:  Negative for constipation, diarrhea, nausea and vomiting.   Endocrine: Negative for polydipsia and polyuria.   Genitourinary:  Negative for decreased urine volume and difficulty urinating.   Allergic/Immunologic: Positive for immunocompromised state.   Psychiatric/Behavioral:  Negative for behavioral problems and confusion.      Objective:     Vital Signs (Most Recent):  Temp: 97.9 °F (36.6 °C) (06/06/25 1157)  Pulse: (!) 128 (06/06/25 1339)  Resp: 18 (06/06/25 1157)  BP: (!) 101/59 (06/06/25 1303)  SpO2: 95 % (06/06/25 1157) Vital Signs (24h Range):  Temp:  [97.3 °F (36.3 °C)-97.9 °F (36.6 °C)] 97.9 °F (36.6 °C)  Pulse:  [] 128  Resp:  [18-20] 18  SpO2:  [94 %-98 %] 95 %  BP: (101-160)/(59-86) 101/59     Weight: 93.6 kg (206 lb 5.6 oz)  Body mass index is 27.22 kg/m².    Intake/Output Summary (Last 24 hours) at 6/6/2025 1354  Last data filed at 6/6/2025 0801  Gross per 24 hour   Intake 480 ml   Output 3900 ml   Net -3420 ml         Physical Exam  Constitutional:       General: He is not in acute distress.     Appearance: Normal appearance. He is not ill-appearing.   HENT:      Head: Normocephalic and atraumatic.      Nose: No congestion.   Eyes:      Extraocular Movements: Extraocular  movements intact.      Conjunctiva/sclera: Conjunctivae normal.   Cardiovascular:      Rate and Rhythm: Normal rate. Rhythm irregular.      Pulses: Normal pulses.      Heart sounds: Murmur heard.   Pulmonary:      Effort: Pulmonary effort is normal. No respiratory distress.      Breath sounds: Examination of the right-lower field reveals rales. Examination of the left-lower field reveals rales. Rales (mild and improving) present. No rhonchi (resolved).   Abdominal:      General: There is no distension.      Palpations: Abdomen is soft.      Tenderness: There is no abdominal tenderness.   Musculoskeletal:      Right lower leg: No edema (minimal).      Left lower leg: No edema.   Skin:     General: Skin is warm.      Coloration: Skin is not pale.   Neurological:      General: No focal deficit present.      Mental Status: He is alert and oriented to person, place, and time.   Psychiatric:         Mood and Affect: Mood normal.         Thought Content: Thought content normal.               Significant Labs: All pertinent labs within the past 24 hours have been reviewed.  CBC:   Recent Labs   Lab 06/05/25  0609 06/06/25  0439   WBC 5.52 5.83   HGB 8.8* 9.7*   HCT 29.4* 35.7*    190     CMP:   Recent Labs   Lab 06/05/25  0609 06/06/25  0439    137   K 3.7 3.6    103   CO2 25 21*    99   BUN 61* 65*   CREATININE 3.2* 3.1*   CALCIUM 8.5* 8.6*   PROT 6.1 6.1   ALBUMIN 3.0* 2.9*   BILITOT 0.4 0.3   ALKPHOS 51 51   AST 12 16   ALT <5* <5*   ANIONGAP 9 13       Significant Imaging: I have reviewed all pertinent imaging results/findings within the past 24 hours.      Assessment & Plan  Acute on chronic diastolic congestive heart failure  Patient has Combined Systolic and Diastolic heart failure that is Acute on chronic. On presentation their CHF was decompensated. Evidence of decompensated CHF on presentation includes: edema, crackles on lung auscultation, orthopnea, and shortness of breath. The etiology  "of their decompensation is likely the stoppage of some of her BP and HF medications.  Most recent BNP and echo results are listed below.  No results for input(s): "BNP" in the last 72 hours.    Latest ECHO  Results for orders placed during the hospital encounter of 05/22/25    Echo    Interpretation Summary    Left Ventricle: The left ventricle is normal in size. Moderately increased ventricular mass. Increased wall thickness. Mild septal thickening. There is moderate concentric hypertrophy. Mild global hypokinesis and regional wall motion abnormalities present. See diagram for wall motion findings. There is mildly reduced systolic function with a visually estimated ejection fraction of 45 - 50%. Ejection fraction is approximately 48%. Quantitated ejection fraction is 45%. There is indeterminate diastolic function.    Right Ventricle: The right ventricle is normal in size Wall thickness is normal. Systolic function is normal.    Left Atrium: The left atrium is severely dilated    Aortic Valve: There is mild aortic valve sclerosis. There is mild to mild-moderate aortic regurgitation.    Mitral Valve: There is mild regurgitation.    Tricuspid Valve: There is mild regurgitation.    Pulmonary Artery: There is mild pulmonary hypertension. The estimated pulmonary artery systolic pressure is 46 mmHg.    IVC/SVC: Elevated venous pressure at 15 mmHg.    Current Heart Failure Medications  hydrALAZINE injection 10 mg, Every 6 hours PRN, Intravenous  , 2 times daily, Oral  sacubitriL-valsartan 49-51 mg per tablet 1 tablet, 2 times daily, Oral  hydrALAZINE tablet 100 mg, Every 8 hours, Oral    Plan  - Monitor strict I&Os and daily weights.    - Place on telemetry  - Low sodium diet  - Place on fluid restriction of 1.5 L.   - The patient's volume status is worsening as indicated by edema, crackles on lung auscultation, weight gain, orthopnea, and shortness of breath. Will continue current treatment  - IV lasix 80 mg to be " transitioned to Torsemide prior to discharge    Hypertensive urgency  Plan:  - clonidine patch 0.1 mg discontinued  - doxazosin 8 mg  - hydralazine 100 mg t.i.d.  - Entresto 49-51 mg  - isosorbide dinitrate 20 mg discontinued  - Patient started on Metoprolol  Hypercoagulable state due to paroxysmal atrial fibrillation  Patient has paroxysmal (<7 days) atrial fibrillation. Patient is currently in sinus rhythm. [id number] Score: 3. The patients heart rate in the last 24 hours is as follows:  Pulse  Min: 63  Max: 130     Antiarrhythmics  metoprolol tartrate (LOPRESSOR) tablet 25 mg, 3 times daily, Oral    Anticoagulants  rivaroxaban tablet 15 mg, With dinner, Oral    Plan  - Replete lytes with a goal of K>4, Mg >2  - Patient is anticoagulated, see medications listed above.  - Patient's afib is currently controlled      Acute-on-chronic kidney injury   Baseline creatinine is 2.6-2.8. Most recent creatinine and eGFR are listed below.  Recent Labs     06/04/25 0425 06/05/25 0609 06/06/25 0439   CREATININE 3.1* 3.2* 3.1*   EGFRNORACEVR 21* 20* 21*      Plan  - PHUONG is stable. Will continue current treatment  - Avoid nephrotoxins and renally dose meds for GFR listed above  - Monitor urine output, serial BMP, and adjust therapy as needed      Diabetic polyneuropathy associated with type 2 diabetes mellitus    Continue Gabapentin  Hyperlipidemia    Continue current statin therapy.  Immunocompromised state due to drug therapy  On daily Tacrolimus    KTM consulted  Will follow 48-hourly Tacro levels    Anemia of chronic disease  Anemia is likely due to chronic disease due to Chronic Kidney Disease. Most recent hemoglobin and hematocrit are listed below.  Recent Labs     06/04/25 0425 06/05/25 0609 06/06/25  0439   HGB 8.7* 8.8* 9.7*   HCT 29.2* 29.4* 35.7*     Plan  - Monitor serial CBC: Daily  - Transfuse PRBC if patient becomes hemodynamically unstable, symptomatic or H/H drops below 7/21.  - Patient has not received any  PRBC transfusions to date  - Patient's anemia is currently stable    Insulin pump in place  Endocrinology consulted for Insulin adjustments while inpatient.  Will reach out to Endocrinology before discharge for final recs    Type 2 diabetes mellitus with stage 4 chronic kidney disease, with long-term current use of insulin  Creatine stable for now. BMP reviewed- noted Estimated Creatinine Clearance: 26.1 mL/min (A) (based on SCr of 3.1 mg/dL (H)). according to latest data. Based on current GFR, CKD stage is stage 4 - GFR 15-29.  Monitor UOP and serial BMP and adjust therapy as needed. Renally dose meds. Avoid nephrotoxic medications and procedures.  Status post -donor kidney transplantation      CKD IV (severe)  Creatine stable for now. BMP reviewed- noted Estimated Creatinine Clearance: 26.1 mL/min (A) (based on SCr of 3.1 mg/dL (H)). according to latest data. Based on current GFR, CKD stage is stage 4 - GFR 15-29.  Monitor UOP and serial BMP and adjust therapy as needed. Renally dose meds. Avoid nephrotoxic medications and procedures.  Hypokalemia  Patient's most recent potassium results are listed below.   Recent Labs     25  0425 25  0609 25  0439   K 3.7 3.7 3.6     Plan  - Replete potassium per protocol  - Monitor potassium Daily  - Patient's hypokalemia is stable    VTE Risk Mitigation (From admission, onward)           Ordered     rivaroxaban tablet 15 mg  With dinner         25 1421     IP VTE HIGH RISK PATIENT  Once         25 1326     Place sequential compression device  Until discontinued         25 1326     Reason for No Pharmacological VTE Prophylaxis  Once        Question:  Reasons:  Answer:  Already adequately anticoagulated on oral Anticoagulants    25 1326                    Discharge Planning   UBALDO: 2025     Code Status: Full Code   Medical Readiness for Discharge Date:   Discharge Plan A: Home with family          Zuri Vidales  MD  Department of Hospital Medicine   [facility]

## 2025-06-06 NOTE — PLAN OF CARE
[facility]  Discharge Reassessment    Primary Care Provider: Mima Mack MD    Expected Discharge Date: 6/7/2025    Reassessment (most recent)       Discharge Reassessment - 06/06/25 1715          Discharge Reassessment    Assessment Type Discharge Planning Reassessment     Did the patient's condition or plan change since previous assessment? No     Communicated UBALDO with patient/caregiver Date not available/Unable to determine     Discharge Plan A Home with family     Discharge Plan B Home with family     DME Needed Upon Discharge  none     Transition of Care Barriers None     Why the patient remains in the hospital Requires continued medical care        Post-Acute Status    Discharge Delays None known at this time                   Discharge Plan A and Plan B have been determined by review of patient's clinical status, future medical and therapeutic needs, and coverage/benefits for post-acute care in coordination with multidisciplinary team members.     Patient not medically ready for discharge     Margoth Zimmer RN  Case Management  [phone]

## 2025-06-06 NOTE — ASSESSMENT & PLAN NOTE
Endocrinology consulted for Insulin adjustments while inpatient.  Will reach out to Endocrinology before discharge for final recs

## 2025-06-07 VITALS
TEMPERATURE: 98 F | RESPIRATION RATE: 17 BRPM | WEIGHT: 206.38 LBS | BODY MASS INDEX: 27.35 KG/M2 | HEIGHT: 73 IN | DIASTOLIC BLOOD PRESSURE: 66 MMHG | SYSTOLIC BLOOD PRESSURE: 118 MMHG | HEART RATE: 64 BPM | OXYGEN SATURATION: 95 %

## 2025-06-07 LAB
ABSOLUTE EOSINOPHIL (OHS): 0.16 K/UL
ABSOLUTE MONOCYTE (OHS): 1.07 K/UL (ref 0.3–1)
ABSOLUTE NEUTROPHIL COUNT (OHS): 1.89 K/UL (ref 1.8–7.7)
ALBUMIN SERPL BCP-MCNC: 2.9 G/DL (ref 3.5–5.2)
ALP SERPL-CCNC: 46 UNIT/L (ref 40–150)
ALT SERPL W/O P-5'-P-CCNC: <5 UNIT/L (ref 10–44)
ANION GAP (OHS): 8 MMOL/L (ref 8–16)
AST SERPL-CCNC: 12 UNIT/L (ref 11–45)
BASOPHILS # BLD AUTO: 0.06 K/UL
BASOPHILS NFR BLD AUTO: 1.1 %
BILIRUB SERPL-MCNC: 0.3 MG/DL (ref 0.1–1)
BUN SERPL-MCNC: 69 MG/DL (ref 8–23)
CALCIUM SERPL-MCNC: 8.5 MG/DL (ref 8.7–10.5)
CHLORIDE SERPL-SCNC: 102 MMOL/L (ref 95–110)
CO2 SERPL-SCNC: 24 MMOL/L (ref 23–29)
CREAT SERPL-MCNC: 3.2 MG/DL (ref 0.5–1.4)
ERYTHROCYTE [DISTWIDTH] IN BLOOD BY AUTOMATED COUNT: 18.7 % (ref 11.5–14.5)
GFR SERPLBLD CREATININE-BSD FMLA CKD-EPI: 20 ML/MIN/1.73/M2
GLUCOSE SERPL-MCNC: 105 MG/DL (ref 70–110)
HCT VFR BLD AUTO: 32.3 % (ref 40–54)
HGB BLD-MCNC: 9.5 GM/DL (ref 14–18)
IMM GRANULOCYTES # BLD AUTO: 0.01 K/UL (ref 0–0.04)
IMM GRANULOCYTES NFR BLD AUTO: 0.2 % (ref 0–0.5)
LYMPHOCYTES # BLD AUTO: 2.17 K/UL (ref 1–4.8)
MAGNESIUM SERPL-MCNC: 2.1 MG/DL (ref 1.6–2.6)
MCH RBC QN AUTO: 21.9 PG (ref 27–31)
MCHC RBC AUTO-ENTMCNC: 29.4 G/DL (ref 32–36)
MCV RBC AUTO: 75 FL (ref 82–98)
NUCLEATED RBC (/100WBC) (OHS): 0 /100 WBC
PHOSPHATE SERPL-MCNC: 4.6 MG/DL (ref 2.7–4.5)
PLATELET # BLD AUTO: 179 K/UL (ref 150–450)
PMV BLD AUTO: ABNORMAL FL
POCT GLUCOSE: 108 MG/DL (ref 70–110)
POCT GLUCOSE: 120 MG/DL (ref 70–110)
POCT GLUCOSE: 159 MG/DL (ref 70–110)
POTASSIUM SERPL-SCNC: 3.7 MMOL/L (ref 3.5–5.1)
PROT SERPL-MCNC: 5.6 GM/DL (ref 6–8.4)
RBC # BLD AUTO: 4.33 M/UL (ref 4.6–6.2)
RELATIVE EOSINOPHIL (OHS): 3 %
RELATIVE LYMPHOCYTE (OHS): 40.5 % (ref 18–48)
RELATIVE MONOCYTE (OHS): 20 % (ref 4–15)
RELATIVE NEUTROPHIL (OHS): 35.2 % (ref 38–73)
SODIUM SERPL-SCNC: 134 MMOL/L (ref 136–145)
TACROLIMUS BLD-MCNC: 6.9 NG/ML (ref 5–15)
WBC # BLD AUTO: 5.36 K/UL (ref 3.9–12.7)

## 2025-06-07 PROCEDURE — 63600175 PHARM REV CODE 636 W HCPCS: Mod: HCNC

## 2025-06-07 PROCEDURE — 84100 ASSAY OF PHOSPHORUS: CPT | Mod: HCNC

## 2025-06-07 PROCEDURE — 99232 SBSQ HOSP IP/OBS MODERATE 35: CPT | Mod: HCNC,,, | Performed by: INTERNAL MEDICINE

## 2025-06-07 PROCEDURE — 80053 COMPREHEN METABOLIC PANEL: CPT | Mod: HCNC

## 2025-06-07 PROCEDURE — 83735 ASSAY OF MAGNESIUM: CPT | Mod: HCNC

## 2025-06-07 PROCEDURE — 25000003 PHARM REV CODE 250: Mod: HCNC

## 2025-06-07 PROCEDURE — 85025 COMPLETE CBC W/AUTO DIFF WBC: CPT | Mod: HCNC

## 2025-06-07 PROCEDURE — 80197 ASSAY OF TACROLIMUS: CPT | Mod: HCNC | Performed by: PHYSICIAN ASSISTANT

## 2025-06-07 RX ORDER — TALC
6 POWDER (GRAM) TOPICAL NIGHTLY PRN
Qty: 30 TABLET | Refills: 0 | Status: SHIPPED | OUTPATIENT
Start: 2025-06-07 | End: 2025-07-07

## 2025-06-07 RX ORDER — TORSEMIDE 20 MG/1
60 TABLET ORAL DAILY
Qty: 90 TABLET | Refills: 11 | Status: SHIPPED | OUTPATIENT
Start: 2025-06-07 | End: 2025-06-18

## 2025-06-07 RX ORDER — METOPROLOL TARTRATE 25 MG/1
25 TABLET, FILM COATED ORAL 2 TIMES DAILY
Qty: 180 TABLET | Refills: 3 | Status: SHIPPED | OUTPATIENT
Start: 2025-06-07 | End: 2026-06-07

## 2025-06-07 RX ORDER — ISOSORBIDE DINITRATE AND HYDRALAZINE HYDROCHLORIDE 37.5; 2 MG/1; MG/1
1 TABLET ORAL 3 TIMES DAILY
Qty: 90 TABLET | Refills: 0 | Status: SHIPPED | OUTPATIENT
Start: 2025-06-07 | End: 2025-07-07

## 2025-06-07 RX ADMIN — SACUBITRIL AND VALSARTAN 1 TABLET: 49; 51 TABLET, FILM COATED ORAL at 09:06

## 2025-06-07 RX ADMIN — TACROLIMUS 1 MG: 1 CAPSULE ORAL at 09:06

## 2025-06-07 RX ADMIN — METOPROLOL TARTRATE 25 MG: 25 TABLET, FILM COATED ORAL at 09:06

## 2025-06-07 RX ADMIN — ATORVASTATIN CALCIUM 80 MG: 40 TABLET, FILM COATED ORAL at 09:06

## 2025-06-07 RX ADMIN — PREDNISONE 5 MG: 5 TABLET ORAL at 09:06

## 2025-06-07 RX ADMIN — DOXAZOSIN 8 MG: 8 TABLET ORAL at 09:06

## 2025-06-07 RX ADMIN — GABAPENTIN 300 MG: 300 CAPSULE ORAL at 09:06

## 2025-06-07 RX ADMIN — HYDRALAZINE HYDROCHLORIDE: 25 TABLET ORAL at 09:06

## 2025-06-07 NOTE — DISCHARGE SUMMARY
Arvind Jay - [facility] Medicine  Discharge Summary      Patient Name: Ravi Zamorano  MRN: [id number]  MARISOL: [id number]  Patient Class: IP- Inpatient  Admission Date: 6/3/2025  Hospital Length of Stay: 3 days  Discharge Date and Time: 06/07/2025 4:41 PM  Attending Physician: Sho att. providers found   Discharging Provider: Zuri Vidales MD  Primary Care Provider: Mima Mack MD  [facility] Medicine Team: [place] [address] Zuri Vidales MD  Primary Care Team: [place] [address]    Hospital Course:   69-year-old male with complex medical history including ESRD s/p kidney transplant (2016), HF with mildly reduced EF (~45%), atrial fibrillation/flutter on Xarelto, and T2DM managed with an insulin pump, presented with worsening dyspnea, orthopnea, and LE edema. He was hypertensive (SBP >200), with pulmonary edema on CXR and BNP ~4000, indicating acute volume overload. No leukocytosis or signs of infection. Renal function remained at baseline (Cr 3.1). His home antihypertensive regimen had been modified prior to presentation (notably discontinuation of HCTZ and eplerenone), possibly contributing to decompensation.He was managed with IV diuretics for volume control, which led to marked symptomatic improvement. However, persistent hypertension required rebuilding his antihypertensive regimen in collaboration with Nephrology and Cardiology.  Key medication changes:  Valsartan stopped; Replaced with Entresto (sacubitril/valsartan) for RAAS blockade and cardiac remodeling  Hydralazine stopped ; Replaced with BiDil (hydralazine + isosorbide dinitrate) for combined afterload and preload reduction, especially beneficial in  patients with HF  Metoprolol succinate started for rate control and neurohormonal support  Xarelto (rivaroxaban) continued for stroke prevention in AF  Tacrolimus and mycophenolate mofetil continued per transplant protocol  Endocrinology reviewed and  adjusted insulin pump settings; glucose remained well controlled. No episodes of hypoglycemia or DKA.  Patient remained hemodynamically stable, euvolemic, and rate-controlled throughout the latter part of hospitalization.  Highlights of discharge Medications:  Sacubitril/Valsartan (Entresto) [moderate BID initiated  BiDil (Hydralazine + Isosorbide Dinitrate) TID initiated  Metoprolol Succinate initiated  Torsemide started at discharge  Rivaroxaban (Xarelto)continued  Tacrolimus / Mycophenolate mofetil continued  Insulin (via pump) continued per endocrinology guidance    Follow-Up Plan:  Nephrology (Transplant): In 1 week BP management, tacrolimus level, renal function monitoring  Cardiology: Rhythm and HFmrEF management  Endocrinology: Ongoing insulin pump oversight  Labs before nephrology follow-up: BMP, tacrolimus trough, BNP, CBC  Acute Care at Home closely following    Condition at Discharge:  Stable and euvolemic  BP controlled on revised regimen  Rhythm stable  Ambulating independently  Counseled extensively on new medications and follow-up plans       Goals of Care Treatment Preferences:  Code Status: Full Code    Physical Exam   Physical Exam  Constitutional:       General: He is not in acute distress.     Appearance: Normal appearance. He is not ill-appearing.   HENT:      Head: Normocephalic and atraumatic.      Nose: No congestion.   Eyes:      Extraocular Movements: Extraocular movements intact.      Conjunctiva/sclera: Conjunctivae normal.   Cardiovascular:      Rate and Rhythm: Normal rate. Rhythm regular     Pulses: Normal pulses.      Heart sounds: Murmur heard.   Pulmonary:      Effort: Pulmonary effort is normal. No respiratory distress.      Breath sounds: Examination of the right-lower field reveals. Examination of the left-lower field reveals no rales present. No rhonchi  Abdominal:      General: There is no distension.      Palpations: Abdomen is soft.      Tenderness: There is no abdominal  tenderness.   Musculoskeletal:      Right lower leg: No edema (minimal).      Left lower leg: No edema.   Skin:     General: Skin is warm.      Coloration: Skin is not pale.   Neurological:      General: No focal deficit present.      Mental Status: He is alert and oriented to person, place, and time.   Psychiatric:         Mood and Affect: Mood normal.         Thought Content: Thought content normal.          SDOH Screening:  The patient was screened for utility difficulties, food insecurity, transport difficulties, housing insecurity, and interpersonal safety and there were no concerns identified this admission.     Consults:   Consults (From admission, onward)          Status Ordering Provider     IP Consult to Kidney/Pancreas Transplant Medicine  Once        Provider:  (Not yet assigned)    Completed MAYUR VALLE     IP Consult to Kidney/Pancreas Transplant Medicine  Once        Provider:  (Not yet assigned)    Completed ALISA SALDIVAR     Inpatient consult to Endocrinology  Once        Provider:  (Not yet assigned)    Completed MAYUR VALLE     Inpatient consult to Registered Dietitian/Nutritionist  Once        Provider:  (Not yet assigned)    Completed MAYUR VALLE                  Final Active Diagnoses:    Diagnosis Date Noted POA    PRINCIPAL PROBLEM:  Acute on chronic diastolic congestive heart failure [I50.33] 2025 No    Hypertensive urgency [I16.0] 03/15/2019 Yes    Hypercoagulable state due to paroxysmal atrial fibrillation [D68.69, I48.0] 2019 Yes    PHUONG (acute kidney injury) [N17.9] 2025 Yes    Long-term use of immunosuppressant medication [Z79.60] 2025 Not Applicable    Insulin pump in place [Z96.41] 2025 Not Applicable    Hypokalemia [E87.6] 2025 Yes    Anemia of chronic disease [D63.8] 2024 Yes    Status post -donor kidney transplantation [Z94.0] 2024 Not Applicable    CKD IV (severe) [N18.4] 2016 Yes     Immunocompromised state due to drug therapy [D84.821, Z79.899] 11/07/2016 Not Applicable    Type 2 diabetes mellitus with stage 4 chronic kidney disease, with long-term current use of insulin [E11.22, N18.4, Z79.4] 07/29/2014 Not Applicable    Diabetic polyneuropathy associated with type 2 diabetes mellitus [E11.42] 07/29/2014 Yes    Hyperlipidemia [E78.5] 07/29/2014 Yes      Problems Resolved During this Admission:       Discharged Condition: stable    Disposition: Home or Self Care    Follow Up:   Follow-up Information       Mima Mack MD. Schedule an appointment as soon as possible for a visit in 1 week(s).    Specialty: Internal Medicine  Contact information:  [address]  [place] [address]  [phone]               [facility] - Cardiology - [facility]. Schedule an appointment as soon as possible for a visit in 1 week(s).    Specialty: Cardiology  Contact information:  [address] [facility] [id number] [phone]  Additional information:  Cardiology Services Clinics - 3rd floor             [facility] NEPHROLOGY. Schedule an appointment as soon as possible for a visit in 2 week(s).    Specialty: Nephrology  Contact information:  [address] [facility] [address]  [phone]                         Patient Instructions:      Ambulatory referral/consult to [facility]   Standing Status: Future   Referral Priority: Routine Referral Type: Consultation   Referred to Provider: PRABHU PROVIDER Requested Specialty: Internal Medicine   Number of Visits Requested: 1     Ambulatory referral/consult to Outpatient Case Management   Referral Priority: Routine Referral Type: Consultation   Referral Reason: Specialty Services Required   Number of Visits Requested: 1     Diet Cardiac     Diet renal     Diet diabetic     Notify your health care provider if you experience any of the following:  difficulty breathing or increased cough     Notify your health care  provider if you experience any of the following:  persistent dizziness, light-headedness, or visual disturbances     Activity as tolerated       Significant Diagnostic Studies: Labs: CMP   Recent Labs   Lab 06/06/25  0439 06/07/25  0705    134*   K 3.6 3.7    102   CO2 21* 24   GLU 99 105   BUN 65* 69*   CREATININE 3.1* 3.2*   CALCIUM 8.6* 8.5*   PROT 6.1 5.6*   ALBUMIN 2.9* 2.9*   BILITOT 0.3 0.3   ALKPHOS 51 46   AST 16 12   ALT <5* <5*   ANIONGAP 13 8    and CBC   Recent Labs   Lab 06/06/25  0439 06/07/25  0705   WBC 5.83 5.36   HGB 9.7* 9.5*   HCT 35.7* 32.3*    179       Pending Diagnostic Studies:       Procedure Component Value Units Date/Time    Protein/Creatinine Ratio, Urine [[id number]]     Order Status: Sent Lab Status: No result     Specimen: Urine, Clean Catch            Medications:  Reconciled Home Medications:      Medication List        PAUSE taking these medications      eplerenone 50 MG Tab  Wait to take this until your doctor or other care provider tells you to start again.  Commonly known as: INSPRA  Take 1 tablet (50 mg total) by mouth once daily.            START taking these medications      ENTRESTO 49-51 mg per tablet  Generic drug: sacubitriL-valsartan  Take 1 tablet by mouth 2 (two) times daily.     isosorbide-hydrALAZINE 20-37.5 mg 20-37.5 mg Tab  Commonly known as: BIDIL  Take 1 tablet by mouth 3 (three) times daily.     melatonin 3 mg tablet  Commonly known as: MELATIN  Take 2 tablets (6 mg total) by mouth nightly as needed for Insomnia.     metoprolol tartrate 25 MG tablet  Commonly known as: LOPRESSOR  Take 1 tablet (25 mg total) by mouth 2 (two) times daily.     torsemide 20 MG Tab  Commonly known as: DEMADEX  Take 3 tablets (60 mg total) by mouth once daily.            CHANGE how you take these medications      gabapentin 300 MG capsule  Commonly known as: NEURONTIN  Take 1 capsule (300 mg total) by mouth 2 (two) times daily.  What changed: when to take this    "  VITAMIN D2 1,250 mcg (50,000 unit) capsule  Generic drug: ergocalciferol  Take 1 capsule (50,000 Units total) by mouth every 7 days.  What changed: additional instructions            CONTINUE taking these medications      acetaminophen 500 MG tablet  Commonly known as: TYLENOL  Take 500 mg by mouth every 6 (six) hours as needed for Pain.     aspirin 81 mg Tab  Take 81 mg by mouth once daily.     atorvastatin 80 MG tablet  Commonly known as: LIPITOR  Take 1 tablet (80 mg total) by mouth once daily.     BD ULTRA-FINE LO PEN NEEDLE 32 gauge x 5/32" Ndle  Generic drug: pen needle, diabetic  USE TO ADMINISTER INSULIN 4 TIMES DAILY.     DEXCOM G7 SENSOR Kayla  Generic drug: blood-glucose sensor  Change sensor every 10 days.     doxazosin 8 MG Tab  Commonly known as: CARDURA  Take 1 tablet (8 mg total) by mouth once daily.     famotidine 20 MG tablet  Commonly known as: PEPCID  Take 1 tablet by mouth daily as needed (Acid reflux).     JARDIANCE 10 mg tablet  Generic drug: empagliflozin  Take 1 tablet (10 mg total) by mouth once daily.     magnesium oxide 400 mg (241.3 mg magnesium) tablet  Commonly known as: MAG-OX  Take 1 tablet (400 mg total) by mouth 2 (two) times daily.     meclizine 25 mg tablet  Commonly known as: ANTIVERT  Take 1 tablet (25 mg total) by mouth 3 (three) times daily as needed for Dizziness.     MIRALAX 17 gram Pwpk  Generic drug: polyethylene glycol  Take 17 g by mouth daily as needed for Constipation. Take 17 gm (mixed in liquid) by mouth every day.     mycophenolate 250 mg Cap  Commonly known as: CELLCEPT  Take 500 mg by mouth 2 (two) times daily.     NovoLOG U-100 Insulin aspart 100 unit/mL injection  Generic drug: insulin aspart U-100  Use in pump, max daily 100 units.     OMNIPOD 5 G6-G7 PODS (GEN 5) Crtg  Generic drug: insulin pump cart,auto,BT,G6/7  Change every 48 hours.     predniSONE 5 MG tablet  Commonly known as: DELTASONE  Take 1 tablet (5 mg total) by mouth once daily.     tacrolimus " 1 MG Cap  Commonly known as: PROGRAF  Take 1 capsule (1 mg total) by mouth every 12 (twelve) hours.     TRUE METRIX GLUCOSE METER Misc  Generic drug: blood-glucose meter  Use to check blood glucose 1 times daily, to use with insurance preferred meter     TRUE METRIX GLUCOSE TEST STRIP Strp  Generic drug: blood sugar diagnostic  Use to check blood glucose 1 times daily, to use with insurance preferred meter     TRUEPLUS LANCETS 30 gauge Misc  Generic drug: lancets  Use to check blood glucose 1 times daily, to use with insurance preferred meter     XARELTO 15 mg Tab  Generic drug: rivaroxaban  Take 1 tablet (15 mg total) by mouth daily with dinner or evening meal.            STOP taking these medications      cloNIDine 0.1 mg/24 hr td ptwk 0.1 mg/24 hr  Commonly known as: CATAPRES     hydrALAZINE 100 MG tablet  Commonly known as: APRESOLINE     levoFLOXacin 750 MG tablet  Commonly known as: LEVAQUIN     valsartan 320 MG tablet  Commonly known as: DIOVAN              Indwelling Lines/Drains at time of discharge:   Lines/Drains/Airways       None                   Time spent on the discharge of patient: 60 minutes         Zuri Vidales MD  Department of Hospital Medicine  [facility]

## 2025-06-07 NOTE — PLAN OF CARE
06/07/25 0956   Medicare Message   Important Message from Medicare regarding Discharge Appeal Rights Given to patient/caregiver;Explained to patient/caregiver;Signed/date by patient/caregiver   Date IMM was signed 06/07/25   Time IMM was signed 0930     IMM explained.  Pt verbalized understanding.    Lashawn Ta RN, BSN  Case Management  [phone]

## 2025-06-07 NOTE — PLAN OF CARE
[facility] - Acute Medical Stepdown  Discharge Final Note    Primary Care Provider: Mima Mack MD    Expected Discharge Date: 6/7/2025    Patient discharged to home via private transportation.         Discharge Plan A and Plan B have been determined by review of patient's clinical status, future medical and therapeutic needs, and coverage/benefits for post-acute care in coordination with multidisciplinary team members.        Final Discharge Note (most recent)       Final Note - 06/07/25 1325          Final Note    Assessment Type Final Discharge Note (P)      Anticipated Discharge Disposition Home or Self Care (P)      What phone number can be called within the next 1-3 days to see how you are doing after discharge? [phone] (P)         Post-Acute Status    Discharge Delays None known at this time (P)                      Important Message from Medicare  Important Message from Medicare regarding Discharge Appeal Rights: Given to patient/caregiver, Explained to patient/caregiver, Signed/date by patient/caregiver     Date IMM was signed: 06/07/25  Time IMM was signed: 0930    Contact Info       Mima Mack MD   Specialty: Internal Medicine   Relationship: PCP - General    [address] [facility] [address]   Phone: [phone]       Next Steps: Schedule an appointment as soon as possible for a visit in 1 week(s)    [facility] - Cardiology - [facility]   Specialty: Cardiology    [address]  [facility] [address]   Phone: [phone]       Next Steps: Schedule an appointment as soon as possible for a visit in 1 week(s)    [facility] NEPHROLOGY   Specialty: Nephrology    [address] [facility] [address]   Phone: [phone]       Next Steps: Schedule an appointment as soon as possible for a visit in 2 week(s)            Future Appointments   Date Time Provider Department Center   6/12/2025  9:00 AM 3PREP, JOSSIE JOSEPH NOM SSCU [facility]   6/12/2025 11:00 AM INPATIENT, FELICIANO FOX  ECHOSTR [facility]   6/13/2025  9:30 AM LAB, APPOINTMENT [facility] LAB [facility]   6/13/2025 10:00 AM Paris Lujan [facility]-C [facility]   6/13/2025 10:00 AM [facility] HEART FAILURE NURSE [facility]   6/13/2025 11:30 AM EPO INJECTION SCHEDULE [facility] NEPHRO [facility]   6/16/2025 10:30 AM Nayely Vital PA-C [facility] CARDIO [facility]   6/17/2025 10:00 AM Argelia Bridges RD [facility] INTLDIA [facility] PCW   7/29/2025  2:30 PM Devin Wood MD [facility] PULMSVC [facility]   9/30/2025  9:30 AM [facility] LAB [facility]   10/7/2025 11:00 AM Karan Lopez, APRN, FNP [facility] IM [facility]     No DME or HH needed.    Lashawn Ta RN, BSN  Case Management  [phone]

## 2025-06-07 NOTE — PROGRESS NOTES
"KIDNEY TRANSPLANT MEDICINE PROGRESS NOTE    Please refer to detailed progress note by myself from 6/5/25 for complete details.    Interval history: He feels well, anxious to go home, which he hopes is Saturday.     I/O last 3 completed shifts:  In: 480 [P.O.:480]  Out: 5200 [Urine:5200]    VITALS:  height is 6' 1" (1.854 m) and weight is 93.6 kg (206 lb 5.6 oz). His temperature is 97.1 °F (36.2 °C). His blood pressure is 118/67 and his pulse is 61. His respiration is 18 and oxygen saturation is 95%.       A&O x3, NAD.  Lungs CTA, unlabored.  Heart irreg irregular  Abdomen soft, nontender, no masses.  Allograft nontender.  Edema: none.    Recent Labs   Lab 06/04/25 0425 06/05/25  0609 06/06/25  0439   WBC 5.32 5.52 5.83   HGB 8.7* 8.8* 9.7*   HCT 29.2* 29.4* 35.7*    167 190       Recent Labs   Lab 06/04/25 0425 06/05/25  0609 06/06/25  0439    137 137   K 3.7 3.7 3.6    103 103   CO2 22* 25 21*   BUN 58* 61* 65*   CREATININE 3.1* 3.2* 3.1*   CALCIUM 8.5* 8.5* 8.6*   PHOS 4.0 4.5 4.8*     Recent Labs   Lab 06/04/25  0904 06/05/25  0609 06/06/25  0439   Tacrolimus 6.6 7.2 7.1       ASSESSMENT/PLAN:    Kidney transplant with PHUONG on CKD IV  -Creat stable today  -b/l cr mid 2s  -Follow with strict I/Os, daily weights, BP (goal <140/90), daily labs.    -Avoid nephrotoxic agents when feasible (NSAIDs, IV contrast dye, Aminoglycoside-containing antibiotics)  -Renally dose all appropriate medications, including antibiotics      Immunosuppression management post Transplant, on prophylactic immunosuppression to prevent rejection.   -Remains in medication induced immunosuppressed state  -Continue to trend immunosuppression levels.   -Monitor for med-related side effects or drug toxicity given immunosuppressant med with narrow therapeutic window.      Hypertensive urgency  -BP meds have undergone mult adjustments, now on lower side. Agree with observation.  -Should ambulate and ensure he is not orthostatic " or symptomatic with activity prior to dc.

## 2025-06-07 NOTE — SUBJECTIVE & OBJECTIVE
"Interval HPI:   overnight events: No acute events overnight. Patient in room 84546/88540 A. Blood glucose stable. BG at goal on current insulin regimen (Home Insulin Pump). Steroid use- Prednisone 5 mg QD.    Renal function- Abnormal -  Lab Results   Component Value Date    CREATININE 3.2 (H) 2025       Vasopressors-  None         Endocrine will continue to follow and manage insulin orders inpatient.            Diet Low Sodium (2 gm) Fluid - 1500mL      Eatin%  Nausea: No  Hypoglycemia and intervention: No  Fever: No  TPN and/or TF: No  If yes, type of TF/TPN and rate: n/a    BP (!) (P) 148/73   Pulse (P) 63   Temp 97.6 °F (36.4 °C) (Oral)   Resp 17   Ht 6' 1" (1.854 m)   Wt 93.6 kg (206 lb 5.6 oz)   SpO2 99%   BMI 27.22 kg/m²     Labs Reviewed and Include    Recent Labs   Lab 25  0705      CALCIUM 8.5*   ALBUMIN 2.9*   PROT 5.6*   *   K 3.7   CO2 24      BUN 69*   CREATININE 3.2*   ALKPHOS 46   ALT <5*   AST 12   BILITOT 0.3     Lab Results   Component Value Date    WBC 5.36 2025    HGB 9.5 (L) 2025    HCT 32.3 (L) 2025    MCV 75 (L) 2025     2025     No results for input(s): "TSH", "FREET4" in the last 168 hours.  Lab Results   Component Value Date    HGBA1C 6.4 (H) 2025       Nutritional status:   Body mass index is 27.22 kg/m².  Lab Results   Component Value Date    ALBUMIN 2.9 (L) 2025    ALBUMIN 2.9 (L) 2025    ALBUMIN 3.0 (L) 2025     No results found for: "PREALBUMIN"    Estimated Creatinine Clearance: 25.3 mL/min (A) (based on SCr of 3.2 mg/dL (H)).    Accu-Checks  Recent Labs     25  1144 25  1605 25  2105 25  0202 25  0759 25  1155 25  1625 25  2103 25  0204 25  0748   POCTGLUCOSE 135* 191* 101 123* 121* 120* 154* 121* 108 120*       Current Medications and/or Treatments Impacting Glycemic Control  Immunotherapy:    Immunosuppressants         "   Stop Route Frequency     tacrolimus capsule 1 mg         -- Oral 2 times daily          Steroids:   Hormones (From admission, onward)      Start     Stop Route Frequency Ordered    06/04/25 0900  predniSONE tablet 5 mg         -- Oral Daily 06/03/25 1421    06/03/25 1424  melatonin tablet 6 mg         -- Oral Nightly PRN 06/03/25 1326          Pressors:    Autonomic Drugs (From admission, onward)      None          Hyperglycemia/Diabetes Medications:   Antihyperglycemics (From admission, onward)      Start     Stop Route Frequency Ordered    06/03/25 1745  insulin aspart U-100 insulin pump from home        Question Answer Comment   Target number 110    Basal Rate #1 0.6    Basal rate #1 time [id number]        -- SubQ Continuous 06/03/25 1632    06/03/25 1730  insulin aspart U-100 insulin pump from home 0-10 Units        Question Answer Comment   Target number 110    Carbohydrate coverage #1 1:7.5    Carbohydrate coverage #1 time [id number]    Sensitivity #1 1:40    Sensitivity #1 time [id number]        -- SubQ As needed (PRN) 06/03/25 1632

## 2025-06-07 NOTE — PLAN OF CARE
Patient is alert and oriented x4 , room air, ambulates to the rest room. Denies chest pain and shortness of breath. Point of focus is Bp and heart rate, he is on metoprolol. Possible discharge today  Care on going  Problem: Adult Inpatient Plan of Care  Goal: Plan of Care Review  Outcome: Progressing  Goal: Patient-Specific Goal (Individualized)  Outcome: Progressing  Goal: Absence of Hospital-Acquired Illness or Injury  Outcome: Progressing  Goal: Optimal Comfort and Wellbeing  Outcome: Progressing     Problem: Pneumonia  Goal: Fluid Balance  Outcome: Progressing  Goal: Resolution of Infection Signs and Symptoms  Outcome: Progressing     Problem: Diabetes Comorbidity  Goal: Blood Glucose Level Within Targeted Range  Outcome: Progressing     Problem: Comorbidity Management  Goal: Maintenance of Behavioral Health Symptom Control  Outcome: Progressing  Goal: Maintenance of Heart Failure Symptom Control  Outcome: Progressing  Goal: Blood Pressure in Desired Range  Outcome: Progressing     Problem: Heart Failure  Goal: Optimal Coping  Outcome: Progressing  Goal: Optimal Cardiac Output  Outcome: Progressing  Goal: Stable Heart Rate and Rhythm  Outcome: Progressing  Goal: Optimal Functional Ability  Outcome: Progressing  Goal: Fluid and Electrolyte Balance  Outcome: Progressing  Goal: Improved Oral Intake  Outcome: Progressing  Goal: Effective Oxygenation and Ventilation  Outcome: Progressing  Goal: Effective Breathing Pattern During Sleep  Outcome: Progressing

## 2025-06-08 ENCOUNTER — PATIENT MESSAGE (OUTPATIENT)
Dept: INTERNAL MEDICINE | Facility: CLINIC | Age: 70
End: 2025-06-08
Payer: MEDICARE

## 2025-06-10 ENCOUNTER — DOCUMENTATION ONLY (OUTPATIENT)
Dept: CARDIOLOGY | Facility: CLINIC | Age: 70
End: 2025-06-10
Payer: MEDICARE

## 2025-06-10 ENCOUNTER — PATIENT OUTREACH (OUTPATIENT)
Dept: ADMINISTRATIVE | Facility: CLINIC | Age: 70
End: 2025-06-10
Payer: MEDICARE

## 2025-06-10 ENCOUNTER — PATIENT MESSAGE (OUTPATIENT)
Dept: ADMINISTRATIVE | Facility: CLINIC | Age: 70
End: 2025-06-10
Payer: MEDICARE

## 2025-06-10 NOTE — PROGRESS NOTES
C3 nurse attempted to contact Ravi Zamorano for a TCC post hospital discharge follow up call. No answer. LVM requesting a callback at [phone].    The patient does not have a scheduled HOSFU appointment. Message sent to PCP's staff to assist with HOSFU appointment scheduling.

## 2025-06-10 NOTE — PROGRESS NOTES
"[facility]) hospital discharge 48-72 hour phone follow up completed.     Most Recent Hospital Discharge Date: 6/7/2025  Last admission Diagnosis/chief complaint: SOB    TCC LPN Navigator spoke with Mr. Ravi Zamorano and Mrs. Erika Zamorano.    Current Patient reported weight: 190lbs    Current Patient reported blood pressure and heart rate: BP-131/56 no HR with vitals.    Pt reports the following:  []  Shortness of Breath with Activity  []  Shortness of Breath at rest   []  Fatigue  []  Edema   [] Chest pain or tightness  [] Weight Increase since discharge  [x] None of the above    Medications:   Discharge medication reviewed with pt.  Pt reports having medication list available and has all medications at home for use per list. The pt was informed he was to not take Eplerenone 50mg once daily until he speak with a cardiologist. The pt did take it 6/10/2025 in the morning. He did start taking the following medications: Entresto 49/51mg BID, Isosorbide-Hydralazine 20-37.5mg TID, Metoprolol Tartrate 25mg BID, and Torsemide 20mg take 60mg once daily. The pt was to continue the following: Jardiance 10mg daily, (this medication is paused for now due to the pt upcoming scheduled ablation) and Magnesium Oxide 400mg BID. The pt has confirmed he did stop the following medications: Hydralazine 100mg, Valsartan 320mg and Clonidine 0.1mg.     Education:   Confirmed pt received "Home Care Guide for Heart Failure Patients" while admitted. Reviewed key points as listed below.     Recommend 2 -3 gram sodium restriction and 1500 cc-2000 cc fluid restriction.  Encourage physical activity with graded exercise program.  Requested patient to weigh themselves daily, and to notify us if their weight increases by more than 3 lbs in 1 day or 5 lbs in 3 days.   Reminded patient to use "Daily weight and symptom tracker" to record and guide patient on when and how to call [facility]. PT may also use symptom " tracker if no scale available  Pt reports being in the Green (color) Zone. If in yellow/red, reminded that they should be calling [facility] today or now.     Watch for these Signs and Symptoms: If any of these occur, contact [facility] immediately:   Increase in shortness of breath with movement   Increase in swelling in your legs and ankles   Weight gain of more than 3 pounds in a day or 5 pounds in 3 days.   Difficulty breathing when you are lying down   Worsening fatigue or tiredness   Stomach bloating, a full feeling or a loss of appetite   Increased coughing--especially when you are lying down      Pt was able to verbalize back to LPN in their own words correct diet/fluid restrictions, necessity for exercise, warning signs and symptoms, when and how to contact their TCC team.      Pt educated on follow-up plan while in [facility] program to include:   Week 1 -  F/u appt with Provider and Nurse (Date) 6/13/2025 for 10:00a with labs for 9:30a.   Week 2-5 - In person/ Virtual/ phone call check ins    Week 5-7 - Pt will discharge from [facility] and transition to longterm care provider (Cardiology/PCP/ Advanced Heart Failure).      Patient active on myChart? Yes      Pt given the following contact information for ease of communication: [phone] (Mon-Fri, 8a-5p) & for urgent issues on the weekend to page the Heart Transplant MD on call.  Pt also encouraged utilize [facility] messaging as well.      Will follow up with pt at first clinic visit and Nurse navigator available for pt questions, issues or concerns.

## 2025-06-11 ENCOUNTER — TELEPHONE (OUTPATIENT)
Dept: CARDIOLOGY | Facility: CLINIC | Age: 70
End: 2025-06-11
Payer: MEDICARE

## 2025-06-11 ENCOUNTER — TELEPHONE (OUTPATIENT)
Dept: ELECTROPHYSIOLOGY | Facility: CLINIC | Age: 70
End: 2025-06-11
Payer: MEDICARE

## 2025-06-11 ENCOUNTER — LAB VISIT (OUTPATIENT)
Dept: LAB | Facility: HOSPITAL | Age: 70
End: 2025-06-11
Attending: INTERNAL MEDICINE
Payer: MEDICARE

## 2025-06-11 DIAGNOSIS — Z79.01 ANTICOAGULANT LONG-TERM USE: ICD-10-CM

## 2025-06-11 DIAGNOSIS — Z79.01 ANTICOAGULANT LONG-TERM USE: Primary | ICD-10-CM

## 2025-06-11 LAB
APTT PPP: 33.8 SECONDS (ref 21–32)
INR PPP: 1.4 (ref 0.8–1.2)
PROTHROMBIN TIME: 14.7 SECONDS (ref 9–12.5)

## 2025-06-11 PROCEDURE — 85610 PROTHROMBIN TIME: CPT | Mod: HCNC

## 2025-06-11 PROCEDURE — 85730 THROMBOPLASTIN TIME PARTIAL: CPT | Mod: HCNC

## 2025-06-11 PROCEDURE — 36415 COLL VENOUS BLD VENIPUNCTURE: CPT | Mod: HCNC,PN

## 2025-06-11 NOTE — TELEPHONE ENCOUNTER
Spoke to patient    CONFIRMED procedure arrival time of 9:00 AM on 6/12/2025    Reiterated instructions including:  -Directions to check in desk  -NPO after midnight night prior to procedure  -High importance of HOLDING Jardiance 3 days prior to procedure. Patient confirmed last dose 6/8/2025. Patient to hold insulin the morning of the prior. Per patient they no longer take insulin injections. Patient reports having an insulin pump that directs insulin as needed. Advised patient to reach out to PCP for management guidelines the morning of the procedure. Patient verbalized understanding.   -Confirmed compliance of Xarelto   -Pre-procedure LABS Reviewed. Hgb 9.5 Hct 32.3. patient reports no sign of active bleeding. Patient has history of anemia/ ckd and takes Retacrit injections weekly. Will inform Dr. Bowden. Patient scheduled to have STAT PT/PTT/INR today at 1:00 PM.   -Confirmed  presence of implanted device/stimulator insulin pump   -Confirmed no fever, cough . Patient recently admitted on 6/3/2025 for shortness of breath d/t CHF exacerbation. Patient reports no SOB currently and able to lie flat post procedure.   -Confirmed no redness, rash, irritation, or yeast infection to groin area.   -Reviewed current visitor policy    Patient verbalized understanding of above and appreciated the call.

## 2025-06-11 NOTE — TELEPHONE ENCOUNTER
Pt rescheduled from appointment w. Ms Hatfiled to appointment with dr Gillies  due to being in the [facility] program. Teaching done on the [facility] program . Pt agreed to date/time of appointment(s). Reminder mailed to pt as requested.

## 2025-06-11 NOTE — PROGRESS NOTES
C3 nurse spoke with patient's spouse, Erika, who is unable to complete the call at this time. Patient's spouse requested a callback. Patient does not have a HOSFU. Message routed to PCP's staff assistance needed with scheduling a HOSFU.

## 2025-06-12 ENCOUNTER — HOSPITAL ENCOUNTER (OUTPATIENT)
Dept: CARDIOLOGY | Facility: HOSPITAL | Age: 70
Discharge: HOME | End: 2025-06-12
Attending: INTERNAL MEDICINE
Payer: MEDICARE

## 2025-06-12 ENCOUNTER — ANESTHESIA (OUTPATIENT)
Dept: MEDSURG UNIT | Facility: HOSPITAL | Age: 70
End: 2025-06-12
Payer: MEDICARE

## 2025-06-12 ENCOUNTER — HOSPITAL ENCOUNTER (OUTPATIENT)
Facility: HOSPITAL | Age: 70
Discharge: HOME | End: 2025-06-12
Attending: INTERNAL MEDICINE | Admitting: INTERNAL MEDICINE
Payer: MEDICARE

## 2025-06-12 ENCOUNTER — TELEPHONE (OUTPATIENT)
Dept: ELECTROPHYSIOLOGY | Facility: CLINIC | Age: 70
End: 2025-06-12
Payer: MEDICARE

## 2025-06-12 ENCOUNTER — ANESTHESIA EVENT (OUTPATIENT)
Dept: MEDSURG UNIT | Facility: HOSPITAL | Age: 70
End: 2025-06-12
Payer: MEDICARE

## 2025-06-12 VITALS
BODY MASS INDEX: 24.65 KG/M2 | SYSTOLIC BLOOD PRESSURE: 177 MMHG | WEIGHT: 186 LBS | HEIGHT: 73 IN | DIASTOLIC BLOOD PRESSURE: 84 MMHG

## 2025-06-12 VITALS
RESPIRATION RATE: 19 BRPM | TEMPERATURE: 98 F | HEART RATE: 54 BPM | DIASTOLIC BLOOD PRESSURE: 83 MMHG | OXYGEN SATURATION: 97 % | BODY MASS INDEX: 24.65 KG/M2 | SYSTOLIC BLOOD PRESSURE: 174 MMHG | HEIGHT: 73 IN | WEIGHT: 186 LBS

## 2025-06-12 DIAGNOSIS — I48.91 AF (ATRIAL FIBRILLATION): ICD-10-CM

## 2025-06-12 DIAGNOSIS — Z86.73 HISTORY OF CVA (CEREBROVASCULAR ACCIDENT): ICD-10-CM

## 2025-06-12 DIAGNOSIS — I48.19 PERSISTENT ATRIAL FIBRILLATION: ICD-10-CM

## 2025-06-12 DIAGNOSIS — I49.9 ARRHYTHMIA: ICD-10-CM

## 2025-06-12 DIAGNOSIS — I48.3 TYPICAL ATRIAL FLUTTER: ICD-10-CM

## 2025-06-12 DIAGNOSIS — I48.91 ATRIAL FIBRILLATION: ICD-10-CM

## 2025-06-12 PROBLEM — I48.0 PAROXYSMAL ATRIAL FIBRILLATION: Status: ACTIVE | Noted: 2025-06-12

## 2025-06-12 LAB
OHS QRS DURATION: 122 MS
OHS QRS DURATION: 124 MS
OHS QTC CALCULATION: 401 MS
OHS QTC CALCULATION: 455 MS
POCT GLUCOSE: 124 MG/DL (ref 70–110)
POCT GLUCOSE: 130 MG/DL (ref 70–110)

## 2025-06-12 PROCEDURE — 93005 ELECTROCARDIOGRAM TRACING: CPT | Mod: HCNC

## 2025-06-12 PROCEDURE — C1759 CATH, INTRA ECHOCARDIOGRAPHY: HCPCS | Mod: HCNC | Performed by: INTERNAL MEDICINE

## 2025-06-12 PROCEDURE — 93010 ELECTROCARDIOGRAM REPORT: CPT | Mod: HCNC,,, | Performed by: INTERNAL MEDICINE

## 2025-06-12 PROCEDURE — C1732 CATH, EP, DIAG/ABL, 3D/VECT: HCPCS | Mod: HCNC | Performed by: INTERNAL MEDICINE

## 2025-06-12 PROCEDURE — C1769 GUIDE WIRE: HCPCS | Mod: HCNC | Performed by: INTERNAL MEDICINE

## 2025-06-12 PROCEDURE — 93656 COMPRE EP EVAL ABLTJ ATR FIB: CPT | Mod: HCNC | Performed by: INTERNAL MEDICINE

## 2025-06-12 PROCEDURE — 93312 ECHO TRANSESOPHAGEAL: CPT | Mod: HCNC

## 2025-06-12 PROCEDURE — 25000003 PHARM REV CODE 250: Mod: HCNC | Performed by: STUDENT IN AN ORGANIZED HEALTH CARE EDUCATION/TRAINING PROGRAM

## 2025-06-12 PROCEDURE — 25000003 PHARM REV CODE 250: Mod: HCNC | Performed by: INTERNAL MEDICINE

## 2025-06-12 PROCEDURE — 93656 COMPRE EP EVAL ABLTJ ATR FIB: CPT | Mod: HCNC,,, | Performed by: INTERNAL MEDICINE

## 2025-06-12 PROCEDURE — 37000009 HC ANESTHESIA EA ADD 15 MINS: Mod: HCNC | Performed by: INTERNAL MEDICINE

## 2025-06-12 PROCEDURE — 63600175 PHARM REV CODE 636 W HCPCS: Mod: HCNC | Performed by: STUDENT IN AN ORGANIZED HEALTH CARE EDUCATION/TRAINING PROGRAM

## 2025-06-12 PROCEDURE — C1766 INTRO/SHEATH,STRBLE,NON-PEEL: HCPCS | Mod: HCNC | Performed by: INTERNAL MEDICINE

## 2025-06-12 PROCEDURE — 63600175 PHARM REV CODE 636 W HCPCS: Mod: HCNC | Performed by: INTERNAL MEDICINE

## 2025-06-12 PROCEDURE — C1887 CATHETER, GUIDING: HCPCS | Mod: HCNC | Performed by: INTERNAL MEDICINE

## 2025-06-12 PROCEDURE — 63600175 PHARM REV CODE 636 W HCPCS: Mod: HCNC | Performed by: NURSE ANESTHETIST, CERTIFIED REGISTERED

## 2025-06-12 PROCEDURE — C1894 INTRO/SHEATH, NON-LASER: HCPCS | Mod: HCNC | Performed by: INTERNAL MEDICINE

## 2025-06-12 PROCEDURE — 82962 GLUCOSE BLOOD TEST: CPT | Mod: HCNC | Performed by: INTERNAL MEDICINE

## 2025-06-12 PROCEDURE — 27201423 OPTIME MED/SURG SUP & DEVICES STERILE SUPPLY: Mod: HCNC | Performed by: INTERNAL MEDICINE

## 2025-06-12 PROCEDURE — 25000003 PHARM REV CODE 250: Mod: HCNC | Performed by: NURSE ANESTHETIST, CERTIFIED REGISTERED

## 2025-06-12 PROCEDURE — 37000008 HC ANESTHESIA 1ST 15 MINUTES: Mod: HCNC | Performed by: INTERNAL MEDICINE

## 2025-06-12 PROCEDURE — 93655 ICAR CATH ABLTJ DSCRT ARRHYT: CPT | Mod: HCNC,,, | Performed by: INTERNAL MEDICINE

## 2025-06-12 PROCEDURE — C1730 CATH, EP, 19 OR FEW ELECT: HCPCS | Mod: HCNC | Performed by: INTERNAL MEDICINE

## 2025-06-12 PROCEDURE — 93312 ECHO TRANSESOPHAGEAL: CPT | Mod: 26,,, | Performed by: INTERNAL MEDICINE

## 2025-06-12 PROCEDURE — 93325 DOPPLER ECHO COLOR FLOW MAPG: CPT | Mod: 26,,, | Performed by: INTERNAL MEDICINE

## 2025-06-12 PROCEDURE — 93320 DOPPLER ECHO COMPLETE: CPT | Mod: 26,,, | Performed by: INTERNAL MEDICINE

## 2025-06-12 PROCEDURE — 93655 ICAR CATH ABLTJ DSCRT ARRHYT: CPT | Mod: HCNC | Performed by: INTERNAL MEDICINE

## 2025-06-12 RX ORDER — GLUCAGON 1 MG
1 KIT INJECTION
Status: DISCONTINUED | OUTPATIENT
Start: 2025-06-12 | End: 2025-06-12 | Stop reason: HOSPADM

## 2025-06-12 RX ORDER — SODIUM CHLORIDE 0.9 % (FLUSH) 0.9 %
10 SYRINGE (ML) INJECTION
Status: DISCONTINUED | OUTPATIENT
Start: 2025-06-12 | End: 2025-06-12 | Stop reason: HOSPADM

## 2025-06-12 RX ORDER — HEPARIN SODIUM 1000 [USP'U]/ML
INJECTION, SOLUTION INTRAVENOUS; SUBCUTANEOUS
Status: DISCONTINUED | OUTPATIENT
Start: 2025-06-12 | End: 2025-06-12

## 2025-06-12 RX ORDER — HEPARIN SOD,PORCINE/0.9 % NACL 1000/500ML
INTRAVENOUS SOLUTION INTRAVENOUS
Status: DISCONTINUED | OUTPATIENT
Start: 2025-06-12 | End: 2025-06-12 | Stop reason: HOSPADM

## 2025-06-12 RX ORDER — HEPARIN SODIUM 10000 [USP'U]/100ML
INJECTION, SOLUTION INTRAVENOUS CONTINUOUS PRN
Status: DISCONTINUED | OUTPATIENT
Start: 2025-06-12 | End: 2025-06-12

## 2025-06-12 RX ORDER — LIDOCAINE HYDROCHLORIDE 20 MG/ML
INJECTION, SOLUTION INFILTRATION; PERINEURAL
Status: DISCONTINUED | OUTPATIENT
Start: 2025-06-12 | End: 2025-06-12 | Stop reason: HOSPADM

## 2025-06-12 RX ORDER — PROTAMINE SULFATE 10 MG/ML
INJECTION, SOLUTION INTRAVENOUS
Status: DISCONTINUED | OUTPATIENT
Start: 2025-06-12 | End: 2025-06-12

## 2025-06-12 RX ORDER — MIDAZOLAM HYDROCHLORIDE 1 MG/ML
INJECTION INTRAMUSCULAR; INTRAVENOUS
Status: DISCONTINUED | OUTPATIENT
Start: 2025-06-12 | End: 2025-06-12

## 2025-06-12 RX ORDER — VASOPRESSIN 20 [USP'U]/ML
INJECTION, SOLUTION INTRAMUSCULAR; SUBCUTANEOUS
Status: DISCONTINUED | OUTPATIENT
Start: 2025-06-12 | End: 2025-06-12

## 2025-06-12 RX ORDER — LIDOCAINE HYDROCHLORIDE 20 MG/ML
INJECTION INTRAVENOUS
Status: DISCONTINUED | OUTPATIENT
Start: 2025-06-12 | End: 2025-06-12

## 2025-06-12 RX ORDER — SUCCINYLCHOLINE CHLORIDE 20 MG/ML
INJECTION INTRAMUSCULAR; INTRAVENOUS
Status: DISCONTINUED | OUTPATIENT
Start: 2025-06-12 | End: 2025-06-12

## 2025-06-12 RX ORDER — ACETAMINOPHEN 10 MG/ML
1000 INJECTION, SOLUTION INTRAVENOUS ONCE
Status: DISCONTINUED | OUTPATIENT
Start: 2025-06-12 | End: 2025-06-12 | Stop reason: HOSPADM

## 2025-06-12 RX ORDER — FENTANYL CITRATE 50 UG/ML
INJECTION, SOLUTION INTRAMUSCULAR; INTRAVENOUS
Status: DISCONTINUED | OUTPATIENT
Start: 2025-06-12 | End: 2025-06-12

## 2025-06-12 RX ORDER — FUROSEMIDE 10 MG/ML
40 INJECTION INTRAMUSCULAR; INTRAVENOUS ONCE
Status: COMPLETED | OUTPATIENT
Start: 2025-06-12 | End: 2025-06-12

## 2025-06-12 RX ORDER — PROPOFOL 10 MG/ML
VIAL (ML) INTRAVENOUS
Status: DISCONTINUED | OUTPATIENT
Start: 2025-06-12 | End: 2025-06-12

## 2025-06-12 RX ORDER — ACETAMINOPHEN 325 MG/1
650 TABLET ORAL EVERY 4 HOURS PRN
Status: DISCONTINUED | OUTPATIENT
Start: 2025-06-12 | End: 2025-06-12 | Stop reason: HOSPADM

## 2025-06-12 RX ORDER — ONDANSETRON HYDROCHLORIDE 2 MG/ML
INJECTION, SOLUTION INTRAVENOUS
Status: DISCONTINUED | OUTPATIENT
Start: 2025-06-12 | End: 2025-06-12

## 2025-06-12 RX ORDER — ONDANSETRON HYDROCHLORIDE 2 MG/ML
4 INJECTION, SOLUTION INTRAVENOUS DAILY PRN
Status: DISCONTINUED | OUTPATIENT
Start: 2025-06-12 | End: 2025-06-12 | Stop reason: HOSPADM

## 2025-06-12 RX ADMIN — HEPARIN SODIUM 2500 UNITS: 1000 INJECTION, SOLUTION INTRAVENOUS; SUBCUTANEOUS at 11:06

## 2025-06-12 RX ADMIN — HYDRALAZINE HYDROCHLORIDE: 25 TABLET ORAL at 05:06

## 2025-06-12 RX ADMIN — VASOPRESSIN 1 UNITS: 20 INJECTION INTRAVENOUS at 10:06

## 2025-06-12 RX ADMIN — MIDAZOLAM HYDROCHLORIDE 2 MG: 1 INJECTION, SOLUTION INTRAMUSCULAR; INTRAVENOUS at 10:06

## 2025-06-12 RX ADMIN — PHENYLEPHRINE HYDROCHLORIDE 0.5 MCG/KG/MIN: 10 INJECTION INTRAVENOUS at 10:06

## 2025-06-12 RX ADMIN — HEPARIN SODIUM 2500 UNITS: 1000 INJECTION, SOLUTION INTRAVENOUS; SUBCUTANEOUS at 12:06

## 2025-06-12 RX ADMIN — FENTANYL CITRATE 100 MCG: 50 INJECTION, SOLUTION INTRAMUSCULAR; INTRAVENOUS at 10:06

## 2025-06-12 RX ADMIN — FENTANYL CITRATE 50 MCG: 50 INJECTION, SOLUTION INTRAMUSCULAR; INTRAVENOUS at 12:06

## 2025-06-12 RX ADMIN — FENTANYL CITRATE 25 MCG: 50 INJECTION, SOLUTION INTRAMUSCULAR; INTRAVENOUS at 01:06

## 2025-06-12 RX ADMIN — PROPOFOL 150 MG: 10 INJECTION, EMULSION INTRAVENOUS at 10:06

## 2025-06-12 RX ADMIN — HEPARIN SODIUM 3000 UNITS: 1000 INJECTION, SOLUTION INTRAVENOUS; SUBCUTANEOUS at 12:06

## 2025-06-12 RX ADMIN — FUROSEMIDE 40 MG: 10 INJECTION, SOLUTION INTRAVENOUS at 05:06

## 2025-06-12 RX ADMIN — HEPARIN SODIUM AND DEXTROSE 12 UNITS/KG/HR: 10000; 5 INJECTION INTRAVENOUS at 11:06

## 2025-06-12 RX ADMIN — ONDANSETRON 4 MG: 2 INJECTION INTRAMUSCULAR; INTRAVENOUS at 01:06

## 2025-06-12 RX ADMIN — VASOPRESSIN 0.5 UNITS: 20 INJECTION INTRAVENOUS at 12:06

## 2025-06-12 RX ADMIN — PROTAMINE SULFATE 63 MG: 10 INJECTION, SOLUTION INTRAVENOUS at 01:06

## 2025-06-12 RX ADMIN — PROPOFOL 50 MG: 10 INJECTION, EMULSION INTRAVENOUS at 10:06

## 2025-06-12 RX ADMIN — LIDOCAINE HYDROCHLORIDE 100 MG: 20 INJECTION INTRAVENOUS at 10:06

## 2025-06-12 RX ADMIN — SODIUM CHLORIDE: 0.9 INJECTION, SOLUTION INTRAVENOUS at 10:06

## 2025-06-12 RX ADMIN — HEPARIN SODIUM 12500 UNITS: 1000 INJECTION, SOLUTION INTRAVENOUS; SUBCUTANEOUS at 11:06

## 2025-06-12 RX ADMIN — VASOPRESSIN 0.5 UNITS: 20 INJECTION INTRAVENOUS at 11:06

## 2025-06-12 RX ADMIN — ACETAMINOPHEN 650 MG: 325 TABLET ORAL at 04:06

## 2025-06-12 RX ADMIN — SUCCINYLCHOLINE CHLORIDE 120 MG: 20 INJECTION, SOLUTION INTRAMUSCULAR; INTRAVENOUS at 10:06

## 2025-06-12 NOTE — H&P
[facility] - Short Stay Cardiac Unit  Cardiac Electrophysiology  History and Physical     Admission Date: 6/12/2025  Code Status: Prior   Attending Provider: Bronson Bowden MD   Principal Problem:Paroxysmal atrial fibrillation    Subjective:     Chief Complaint:  AF     HPI:  70 yo M with PMH of CKD 3-4, history of kidney transplant, chronic combined CHF (FELICIANO 2/3/25 EF 40-45%, TTE 12/8/24 G3DD), CVA right MCA 12/24 with minimal residual LLE and left hand weakness, DM2 A1c 8.6 on 01/27, atrial fib/flutter s/p DCCV 02/03/2025 here for redo ablation.      EP background   Patient with Hx of persistent AF, underwent DCCV and flecainide initiation in 8/2019. Over the past several months he has had recurrent symptomatic AF. He underwent DCCV in 8/2019, and was started on flecainide 100 mg bid post-procedure.  QRS wide on ECG--flecainide reduced to 50 mg bid. By 5/2023 QRS had narrowed, although he had an episode of syncope in 5/2023 thought to be due to hypotension 2/2 chlorthalidone. SPECT 5/15/2023 negative for ischemia or scar. Event monitor 1/2024--one episode of AF noted 12/24/2023, and episodes of SR and junctional rhythm with HR ranging from 54-74. S/p CTI/PVI/LA PWI with Dr. Bowden in June 2024 (PVI with LA posterior wall isolation). HV interval post ablation 53ms.  Multaq discontinue as pt remained NSR after the procedure. Only on coreg 25 BID. Had another FELICIANO/cardioversion 2/23/25 with plan to redo ablation outpatient.     Past Medical History:   Diagnosis Date    Anxiety 07/29/2014    Arthritis     atrial fibrillation     History of cardioversion    Bilateral diabetic retinopathy 2017    Cardioembolic stroke 12/07/2024    almost completely resolved - very mild left hand weakness    CKD (chronic kidney disease)     in transplanted kidney    Colon polyps 2014    Depression - situational 07/29/2014    Diabetes type 2 2000    since 2000.  c/b neuropathy, CKD    Encounter for blood transfusion     History of hepatitis  C, s/p successful Rx w/ SVR24 - 9/2017 07/29/2014    Genotype 1a, treatment naive 10/2014 liver biopsy - grade 1 / stage 1 Completed Harvoni w/ SVR    Hyperlipidemia 07/29/2014    Hypertension     Pancreatitis     S/P cadaveric kidney transplant 11/5/2016. ESRD secondary to HTN/DMII 11/05/2016    Stroke 2024       Past Surgical History:   Procedure Laterality Date    ABLATION OF ARRHYTHMOGENIC FOCUS FOR ATRIAL FIBRILLATION N/A 6/11/2024    Procedure: Ablation atrial fibrillation;  Surgeon: Bronson Bowden MD;  Location: [facility] EP LAB;  Service: Cardiology;  Laterality: N/A;  AF, FELICIANO (cx if SR), PVI, RFA, Carto, Gen, GP, 3 Prep    ABLATION, ATRIAL FLUTTER, TYPICAL  6/11/2024    Procedure: Ablation, Atrial Flutter, Typical;  Surgeon: Bronson Bowden MD;  Location: [facility] EP LAB;  Service: Cardiology;;    APPENDECTOMY      BONE MARROW BIOPSY Left 6/26/2024    Procedure: Biopsy-bone marrow;  Surgeon: Bridger Zapata MD;  Location: [facility] ENDO (4TH FLR);  Service: Oncology;  Laterality: Left;  6/24-pt knows to hold aspirin and eliquis as instructed by hem/onc, precall complete-Kpvt    CARDIOVERSION  6/11/2024    Procedure: Cardioversion;  Surgeon: Bronson Bowden MD;  Location: [facility] EP LAB;  Service: Cardiology;;    CATARACT EXTRACTION W/  INTRAOCULAR LENS IMPLANT Right 3/13/2024    Procedure: EXTRACTION, CATARACT, WITH IOL INSERTION;  Surgeon: Jennifer Palacio MD;  Location: [facility] OR;  Service: Ophthalmology;  Laterality: Right;    CATARACT EXTRACTION W/  INTRAOCULAR LENS IMPLANT Left 4/10/2024    Procedure: EXTRACTION, CATARACT, WITH IOL INSERTION;  Surgeon: Jennifer Palacio MD;  Location: [facility] OR;  Service: Ophthalmology;  Laterality: Left;    COLONOSCOPY N/A 12/21/2020    Procedure: COLONOSCOPY;  Surgeon: Tico Bell MD;  Location: [facility] ENDO (4TH FLR);  Service: Endoscopy;  Laterality: N/A;  ok to hold eliquis per Dr. Valadez, see telephone encounter 11/13/2020-MS  covid test 12/18 [place]     ECHOCARDIOGRAM,TRANSESOPHAGEAL N/A 2/3/2025    Procedure: Transesophageal echo (FELICIANO) intra-procedure log documentation;  Surgeon: Grayson Lin III, MD;  Location: [facility] EP LAB;  Service: Cardiology;  Laterality: N/A;    KIDNEY TRANSPLANT      TRANSESOPHAGEAL ECHOCARDIOGRAPHY N/A 8/26/2019    Procedure: ECHOCARDIOGRAM, TRANSESOPHAGEAL;  Surgeon: Dolores Diagnostic Provider;  Location: [facility] EP LAB;  Service: Cardiology;  Laterality: N/A;    TRANSESOPHAGEAL ECHOCARDIOGRAPHY N/A 6/11/2024    Procedure: ECHOCARDIOGRAM, TRANSESOPHAGEAL;  Surgeon: Grayson Lin III, MD;  Location: [facility] EP LAB;  Service: Cardiology;  Laterality: N/A;    TREATMENT OF CARDIAC ARRHYTHMIA N/A 8/26/2019    Procedure: CARDIOVERSION;  Surgeon: Bronson Bowden MD;  Location: [facility] EP LAB;  Service: Cardiology;  Laterality: N/A;  AF, FELICIANO, DCCV, MAC, GP, 3 PREP    TREATMENT OF CARDIAC ARRHYTHMIA N/A 2/3/2025    Procedure: Cardioversion or Defibrillation;  Surgeon: Bronson Bowden MD;  Location: [facility] EP LAB;  Service: Cardiology;  Laterality: N/A;  AF, FELICIANO, DCCV, MAC, GP, 3 Prep       Review of patient's allergies indicates:   Allergen Reactions    Nifedipine Other (See Comments)     Gingival hyperplasia       No current facility-administered medications on file prior to encounter.     Current Outpatient Medications on File Prior to Encounter   Medication Sig    atorvastatin (LIPITOR) 80 MG tablet Take 1 tablet (80 mg total) by mouth once daily.    insulin pump cart,auto,BT,G6/7 (OMNIPOD 5 G6-G7 PODS, GEN 5,) Crtg Change every 48 hours.    magnesium oxide (MAG-OX) 400 mg (241.3 mg magnesium) tablet Take 1 tablet (400 mg total) by mouth 2 (two) times daily.    predniSONE (DELTASONE) 5 MG tablet Take 1 tablet (5 mg total) by mouth once daily.    rivaroxaban (XARELTO) 15 mg Tab Take 1 tablet (15 mg total) by mouth daily with dinner or evening meal.    tacrolimus (PROGRAF) 1 MG Cap Take 1 capsule (1 mg total) by mouth every 12 (twelve) hours.     "blood sugar diagnostic Strp Use to check blood glucose 1 times daily, to use with insurance preferred meter    blood-glucose meter Misc Use to check blood glucose 1 times daily, to use with insurance preferred meter    blood-glucose sensor (DEXCOM G7 SENSOR) Kayla Change sensor every 10 days.    empagliflozin (JARDIANCE) 10 mg tablet Take 1 tablet (10 mg total) by mouth once daily.    [] ergocalciferol (ERGOCALCIFEROL) 50,000 unit Cap Take 1 capsule (50,000 Units total) by mouth every 7 days.    insulin aspart U-100 (NOVOLOG U-100 INSULIN ASPART) 100 unit/mL injection Use in pump, max daily 100 units.    lancets 30 gauge Misc Use to check blood glucose 1 times daily, to use with insurance preferred meter    meclizine (ANTIVERT) 25 mg tablet Take 1 tablet (25 mg total) by mouth 3 (three) times daily as needed for Dizziness.    pen needle, diabetic (BD ULTRA-FINE LO PEN NEEDLE) 32 gauge x 5/32" Ndle USE TO ADMINISTER INSULIN 4 TIMES DAILY.    polyethylene glycol (MIRALAX) 17 gram PwPk Take 17 g by mouth daily as needed for Constipation. Take 17 gm (mixed in liquid) by mouth every day.    [DISCONTINUED] insulin lispro (HUMALOG KWIKPEN INSULIN) 100 unit/mL pen Inject 18 units w/ breakfast, 16 units w/ lunch and dinner plus scale 150-200 +2, 201-250 +4, 251-300 +6, 301-350 +8.     Family History       Problem Relation (Age of Onset)    Cancer Brother    Diabetes Mother    Heart disease Mother, Father    Hypertension Mother, Brother          Tobacco Use    Smoking status: Former     Current packs/day: 0.00     Average packs/day: 0.5 packs/day for 32.0 years (16.0 ttl pk-yrs)     Types: Cigarettes     Start date: 1984     Quit date: 2016     Years since quittin.5    Smokeless tobacco: Never   Vaping Use    Vaping status: Never Used   Substance and Sexual Activity    Alcohol use: Not Currently    Drug use: No    Sexual activity: Yes     Partners: Female     Review of Systems   All other systems " reviewed and are negative.    Objective:     Vital Signs (Most Recent):  Temp: 97.5 °F (36.4 °C) (06/12/25 0850)  Pulse: (!) 57 (06/12/25 0850)  Resp: 18 (06/12/25 0850)  BP: (!) 177/84 (06/12/25 0853)  SpO2: 98 % (06/12/25 0850) Vital Signs (24h Range):  Temp:  [97.5 °F (36.4 °C)] 97.5 °F (36.4 °C)  Pulse:  [57] 57  Resp:  [18] 18  SpO2:  [98 %] 98 %  BP: (177-190)/(84-86) 177/84       Weight: 84.4 kg (186 lb)  Body mass index is 24.54 kg/m².    SpO2: 98 %        Physical Exam  Vitals and nursing note reviewed.   Constitutional:       Appearance: Normal appearance.   Cardiovascular:      Rate and Rhythm: Normal rate and regular rhythm.      Pulses: Normal pulses.      Heart sounds: Normal heart sounds.   Pulmonary:      Effort: Pulmonary effort is normal.      Breath sounds: Normal breath sounds.   Abdominal:      General: Abdomen is flat.   Musculoskeletal:      Right lower leg: No edema.      Left lower leg: No edema.   Skin:     General: Skin is warm.   Neurological:      General: No focal deficit present.      Mental Status: He is alert.            Significant Labs: All pertinent lab results from the last 24 hours have been reviewed.    Assessment and Plan:     * Paroxysmal atrial fibrillation  68 yo M with PMH of CKD 3-4, history of kidney transplant, chronic combined CHF (FELICIANO 2/3/25 EF 40-45%, TTE 12/8/24 G3DD), CVA right MCA 12/24 with minimal residual LLE and left hand weakness, DM2 A1c 8.6 on 01/27, atrial fib/flutter s/p PVI/PWI/CTI here for redo ablation.    Our discussion included, but was not limited to the risk of death, infection, bleeding, stroke, MI, cardiac perforation, embolism, cardiac tamponade, vascular injury, valvular injury, AE fistula (RFA), injury to phrenic nerve (RFA), pulmonary vein stenosis (RFA), rhabdomyolysis (PFA), hemolysis (PFA) and other organic injury including the possibility for need for surgery or pacemaker implantation. Patient verbalized understanding and wishes to  proceed. All questions and concerns were answered. Consents signed.           Celia Red MD  Cardiac Electrophysiology  [facility] - Short Stay Cardiac Unit

## 2025-06-12 NOTE — DISCHARGE INSTRUCTIONS
"Medications:  Make sure to continue taking your blood thinner rivaroxaban (trade name: Xarelto) after your procedure as you would normally take  Take pantoprazole (trade name: Protonix) nightly for at least 30 days after your procedure. This is to protect your esophagus during the post-operative period.  If given a prescription of furosemide (trade name: Lasix), which is a diuretic (fluid pill), you can take it daily or twice daily as needed for fluid retention or shortness of breath following your ablation  You may experience chest discomfort (also known as "pericarditis") with deep breathes, coughing, and/or laying down which is typically normal following your procedure. If this occurs, you can take ibuprofen (Motrin) 800 mg every 8 hours for 2-3 days. If the chest pain is persistent or severe please visit the nearest emergency department.    Groin site management, precautions, and restrictions:  Remove the bandages over your groin area the morning after your procedure. You can shower after you remove these bandages. Keep the groin sites clean and dry. You do not need to apply ointments or bandages to the area.   If oozing from groin site occurs, apply pressure without letting up for 15 minutes and lay flat for 1 hour. If bleeding has resolved, you can continue to monitor. If the bleeding continues or there is significant swelling or pain in the groin area, please visit the nearest ER for evaluation and treatment. DO NOT STOP TAKING YOUR BLOOD THINNER UNLESS INSTRUCTED BY A PHYSICIAN.   Do not take baths or submerge your groin area or at least 1 week or when the puncture sites in your groin have completely healed  Do not lift anything over 10 lbs for the first week after your procedure, and avoid strenuous activity during this time to allow for the groin sites to heal. After 1 week, there are no activity restrictions.  Please contact the electrophysiology clinic or go to the ER if you experience: severe chest " pain, shortness of breath, bleeding or swelling of the groin sites, or any other concerns.

## 2025-06-12 NOTE — ANESTHESIA POSTPROCEDURE EVALUATION
Anesthesia Post Evaluation    Patient: Ravi Zamorano    Procedure(s) Performed: Procedure(s) (LRB):  Ablation atrial fibrillation (N/A)  Ablation, Atrial Flutter, Typical (N/A)  ECHOCARDIOGRAM, TRANSESOPHAGEAL (N/A)  Ablation, Atrial Flutter, Atypical    Final Anesthesia Type: general      Patient location during evaluation: PACU  Patient participation: Yes- Able to Participate  Level of consciousness: awake and alert  Post-procedure vital signs: reviewed and stable  Pain management: adequate  Airway patency: patent    PONV status at discharge: No PONV  Anesthetic complications: no      Cardiovascular status: hemodynamically stable  Respiratory status: unassisted and spontaneous ventilation  Hydration status: euvolemic  Follow-up not needed.              Vitals Value Taken Time   /82 06/12/25 13:54   Temp 36.5C 06/12/25 13:56   Pulse 53 06/12/25 13:55   Resp 18 06/12/25 13:55   SpO2 100 % 06/12/25 13:55   Vitals shown include unfiled device data.      No case tracking events are documented in the log.      Pain/Gurmeet Score: No data recorded

## 2025-06-12 NOTE — CONSULTS
[facility] Stay [facility]iac [facility]  Cardiology  FELICIANO Consult Note    Patient Name: Ravi Zamorano  MRN: [id number]  Admission Date: 6/12/2025  Hospital Length of Stay: 0 days  Code Status: Prior   Attending Provider: Bronson Bowden MD   Consulting Provider: Nayely Vital PA-C  Primary Care Physician: Mima Mack MD  Principal Problem:Paroxysmal atrial fibrillation    Patient information was obtained from patient and past medical records.     Consults  Subjective:     Chief Complaint:  Atrial flutter/fibrillation     HPI:   Mr. Zamorano is a 70 y/o with a PMHx of atrial fibrillation s/p CTI/PVI/PWI 6/11/2024, HFmrEF, hypertension, hyperlipidemia, PAD, IDDM, Hx of CVA, s/p kidney transplant in 2016, CKD Stage 3b, anemia. His last cardioversion was on 2/3/25. He had recurrence of AF/AF shortly afterwards. He has had several hospital admissions in the past few months, mostly for pneumonia, but most recent admission for ADHF. He was diuresed with IV Lasix, and BP meds adjusted. He was also started on Metoprolol for AF with RVR. He was discharged last week. He presents today for FELICIANO and re-do PVI/AFL RFA.    TTE 6/4/2025    Left Ventricle: The left ventricle is normal in size. Ventricular mass is normal. Normal wall thickness. Normal wall motion. There is normal systolic function with a visually estimated ejection fraction of 55 - 60%. Ejection fraction is approximately 60%.    Right Ventricle: Systolic function is borderline low.    Left Atrium: The left atrium is severely dilated    Right Atrium: The right atrium is mildly dilated .    Aortic Valve: There is mild annular calcification present. There is mild aortic regurgitation.    Tricuspid Valve: There is mild regurgitation.    Pulmonary Artery: The estimated pulmonary artery systolic pressure is 36 mmHg.    IVC/SVC: Elevated venous pressure at 15 mmHg.     FELICIANO 2/3/2025    FELICIANO performed prior to DCCV.    Left Atrium: The left atrial appendage has a  cauliflower morphology. Appendage velocity is reduced at less than 40 cm/sec. There is no thrombus in the left atrial appendage. The pulmonary veins are normal in size with systolic blunting.    Left Ventricle: The left ventricle is normal in size. Normal wall thickness. There is mildly reduced systolic function with a visually estimated ejection fraction of 40 - 45%.    Right Ventricle: Normal right ventricular cavity size. Systolic function is normal.    Aortic Valve: There is mild aortic regurgitation.    Mitral Valve: There is mild regurgitation.    Tricuspid Valve: There is mild regurgitation.    Aorta: Ascending aorta is mildly dilated measuring 3.9 cm.    Anticoagulant/antiplatelets: Xarelto 15 mg daily   ECG: Sinus bradycardia     Dysphagia or odynophagia:  No  Liver Disease, esophageal disease, or known varices:  No  Upper GI Bleeding: No  Snoring:  No  Sleep Apnea:  No  Prior neck surgery or radiation:  No  Able to move neck in all directions:  Yes  History of anesthetic difficulties:  No  Family history of anesthetic difficulties:  No  Last oral intake: yesterday before midnight  GLP-1 use: None    Platelet count: 179k  INR: 1.4        Past Medical History:   Diagnosis Date    Anxiety 07/29/2014    Arthritis     atrial fibrillation     History of cardioversion    Bilateral diabetic retinopathy 2017    Cardioembolic stroke 12/07/2024    almost completely resolved - very mild left hand weakness    CKD (chronic kidney disease)     in transplanted kidney    Colon polyps 2014    Depression - situational 07/29/2014    Diabetes type 2 2000    since 2000.  c/b neuropathy, CKD    Encounter for blood transfusion     History of hepatitis C, s/p successful Rx w/ SVR24 - 9/2017 07/29/2014    Genotype 1a, treatment naive 10/2014 liver biopsy - grade 1 / stage 1 Completed Harvoni w/ SVR    Hyperlipidemia 07/29/2014    Hypertension     Pancreatitis     S/P cadaveric kidney transplant 11/5/2016. ESRD secondary to HTN/DMII  11/05/2016    Stroke 2024       Past Surgical History:   Procedure Laterality Date    ABLATION OF ARRHYTHMOGENIC FOCUS FOR ATRIAL FIBRILLATION N/A 6/11/2024    Procedure: Ablation atrial fibrillation;  Surgeon: Bronson Bowden MD;  Location: [facility] EP LAB;  Service: Cardiology;  Laterality: N/A;  AF, FELICIANO (cx if SR), PVI, RFA, Carto, Gen, GP, 3 Prep    ABLATION, ATRIAL FLUTTER, TYPICAL  6/11/2024    Procedure: Ablation, Atrial Flutter, Typical;  Surgeon: Bronson Bowden MD;  Location: [facility] EP LAB;  Service: Cardiology;;    APPENDECTOMY      BONE MARROW BIOPSY Left 6/26/2024    Procedure: Biopsy-bone marrow;  Surgeon: Bridger Zapata MD;  Location: [facility] ENDO (4TH FLR);  Service: Oncology;  Laterality: Left;  6/24-pt knows to hold aspirin and eliquis as instructed by hem/onc, precall complete-Kpvt    CARDIOVERSION  6/11/2024    Procedure: Cardioversion;  Surgeon: Bronson Bowden MD;  Location: [facility] EP LAB;  Service: Cardiology;;    CATARACT EXTRACTION W/  INTRAOCULAR LENS IMPLANT Right 3/13/2024    Procedure: EXTRACTION, CATARACT, WITH IOL INSERTION;  Surgeon: Jennifer Palacio MD;  Location: [facility] OR;  Service: Ophthalmology;  Laterality: Right;    CATARACT EXTRACTION W/  INTRAOCULAR LENS IMPLANT Left 4/10/2024    Procedure: EXTRACTION, CATARACT, WITH IOL INSERTION;  Surgeon: Jennifer Palacio MD;  Location: [facility] OR;  Service: Ophthalmology;  Laterality: Left;    COLONOSCOPY N/A 12/21/2020    Procedure: COLONOSCOPY;  Surgeon: Tico Bell MD;  Location: [facility] ENDO (4TH FLR);  Service: Endoscopy;  Laterality: N/A;  ok to hold eliquis per Dr. Valadez, see telephone encounter 11/13/2020-MS  covid test 12/18 [place]    ECHOCARDIOGRAM,TRANSESOPHAGEAL N/A 2/3/2025    Procedure: Transesophageal echo (FELICIANO) intra-procedure log documentation;  Surgeon: Grayson Lin III, MD;  Location: [facility] EP LAB;  Service: Cardiology;  Laterality: N/A;    KIDNEY TRANSPLANT      TRANSESOPHAGEAL ECHOCARDIOGRAPHY N/A 8/26/2019     Procedure: ECHOCARDIOGRAM, TRANSESOPHAGEAL;  Surgeon: Dolores Diagnostic Provider;  Location: [facility] EP LAB;  Service: Cardiology;  Laterality: N/A;    TRANSESOPHAGEAL ECHOCARDIOGRAPHY N/A 6/11/2024    Procedure: ECHOCARDIOGRAM, TRANSESOPHAGEAL;  Surgeon: Grayson Lin III, MD;  Location: [facility] EP LAB;  Service: Cardiology;  Laterality: N/A;    TREATMENT OF CARDIAC ARRHYTHMIA N/A 8/26/2019    Procedure: CARDIOVERSION;  Surgeon: Bronson Bowden MD;  Location: [facility] EP LAB;  Service: Cardiology;  Laterality: N/A;  AF, FELICIANO, DCCV, MAC, GP, 3 PREP    TREATMENT OF CARDIAC ARRHYTHMIA N/A 2/3/2025    Procedure: Cardioversion or Defibrillation;  Surgeon: Bronson Bowden MD;  Location: [facility] EP LAB;  Service: Cardiology;  Laterality: N/A;  AF, FELICIANO, DCCV, MAC, GP, 3 Prep       Review of patient's allergies indicates:   Allergen Reactions    Nifedipine Other (See Comments)     Gingival hyperplasia       No current facility-administered medications on file prior to encounter.     Current Outpatient Medications on File Prior to Encounter   Medication Sig    atorvastatin (LIPITOR) 80 MG tablet Take 1 tablet (80 mg total) by mouth once daily.    insulin pump cart,auto,BT,G6/7 (OMNIPOD 5 G6-G7 PODS, GEN 5,) Crtg Change every 48 hours.    magnesium oxide (MAG-OX) 400 mg (241.3 mg magnesium) tablet Take 1 tablet (400 mg total) by mouth 2 (two) times daily.    predniSONE (DELTASONE) 5 MG tablet Take 1 tablet (5 mg total) by mouth once daily.    rivaroxaban (XARELTO) 15 mg Tab Take 1 tablet (15 mg total) by mouth daily with dinner or evening meal.    tacrolimus (PROGRAF) 1 MG Cap Take 1 capsule (1 mg total) by mouth every 12 (twelve) hours.    blood sugar diagnostic Strp Use to check blood glucose 1 times daily, to use with insurance preferred meter    blood-glucose meter Misc Use to check blood glucose 1 times daily, to use with insurance preferred meter    blood-glucose sensor (DEXCOM G7 SENSOR) Kayla Change sensor every 10 days.     "empagliflozin (JARDIANCE) 10 mg tablet Take 1 tablet (10 mg total) by mouth once daily.    [] ergocalciferol (ERGOCALCIFEROL) 50,000 unit Cap Take 1 capsule (50,000 Units total) by mouth every 7 days.    insulin aspart U-100 (NOVOLOG U-100 INSULIN ASPART) 100 unit/mL injection Use in pump, max daily 100 units.    lancets 30 gauge Misc Use to check blood glucose 1 times daily, to use with insurance preferred meter    meclizine (ANTIVERT) 25 mg tablet Take 1 tablet (25 mg total) by mouth 3 (three) times daily as needed for Dizziness.    pen needle, diabetic (BD ULTRA-FINE LO PEN NEEDLE) 32 gauge x 5/32" Ndle USE TO ADMINISTER INSULIN 4 TIMES DAILY.    polyethylene glycol (MIRALAX) 17 gram PwPk Take 17 g by mouth daily as needed for Constipation. Take 17 gm (mixed in liquid) by mouth every day.    [DISCONTINUED] insulin lispro (HUMALOG KWIKPEN INSULIN) 100 unit/mL pen Inject 18 units w/ breakfast, 16 units w/ lunch and dinner plus scale 150-200 +2, 201-250 +4, 251-300 +6, 301-350 +8.     Family History       Problem Relation (Age of Onset)    Cancer Brother    Diabetes Mother    Heart disease Mother, Father    Hypertension Mother, Brother          Tobacco Use    Smoking status: Former     Current packs/day: 0.00     Average packs/day: 0.5 packs/day for 32.0 years (16.0 ttl pk-yrs)     Types: Cigarettes     Start date: 1984     Quit date: 2016     Years since quittin.5    Smokeless tobacco: Never   Vaping Use    Vaping status: Never Used   Substance and Sexual Activity    Alcohol use: Not Currently    Drug use: No    Sexual activity: Yes     Partners: Female     Review of Systems   Constitutional: Negative for diaphoresis, malaise/fatigue, weight gain and weight loss.   HENT:  Negative for nosebleeds.    Eyes:  Negative for vision loss in left eye, vision loss in right eye and visual disturbance.   Cardiovascular:  Negative for chest pain, claudication, dyspnea on exertion, irregular heartbeat, " leg swelling, near-syncope, orthopnea, palpitations, paroxysmal nocturnal dyspnea and syncope.   Respiratory:  Negative for cough, shortness of breath, sleep disturbances due to breathing, snoring and wheezing.    Hematologic/Lymphatic: Negative for bleeding problem. Does not bruise/bleed easily.   Skin:  Negative for poor wound healing and rash.   Musculoskeletal:  Negative for muscle cramps and myalgias.   Gastrointestinal:  Negative for bloating, abdominal pain, diarrhea, heartburn, melena, nausea and vomiting.   Genitourinary:  Negative for hematuria and nocturia.   Neurological:  Negative for brief paralysis, dizziness, headaches, light-headedness, numbness and weakness.   Psychiatric/Behavioral:  Negative for depression.    Allergic/Immunologic: Negative for hives.     Objective:     Vital Signs (Most Recent):  Temp: 97.5 °F (36.4 °C) (06/12/25 0850)  Pulse: (!) 57 (06/12/25 0850)  Resp: 18 (06/12/25 0850)  BP: (!) 177/84 (06/12/25 0853)  SpO2: 98 % (06/12/25 0850) Vital Signs (24h Range):  Temp:  [97.5 °F (36.4 °C)] 97.5 °F (36.4 °C)  Pulse:  [57] 57  Resp:  [18] 18  SpO2:  [98 %] 98 %  BP: (177-190)/(84-86) 177/84     Weight: 84.4 kg (186 lb)  Body mass index is 24.54 kg/m².    SpO2: 98 %       No intake or output data in the 24 hours ending 06/12/25 0946    Lines/Drains/Airways       Peripheral Intravenous Line  Duration                  Peripheral IV - Single Lumen 06/12/25 0900 20 G Left Antecubital <1 day         Peripheral IV - Single Lumen 06/12/25 0910 20 G Posterior;Right Hand <1 day                     Physical Exam  Vitals and nursing note reviewed.   Constitutional:       Appearance: He is well-developed. He is not diaphoretic.   HENT:      Head: Normocephalic and atraumatic.   Eyes:      Conjunctiva/sclera: Conjunctivae normal.   Neck:      Vascular: No carotid bruit or JVD.   Cardiovascular:      Rate and Rhythm: Normal rate and regular rhythm.      Pulses: Normal pulses and intact distal  pulses.      Heart sounds: Normal heart sounds. No murmur heard.     No friction rub. No gallop.   Pulmonary:      Effort: Pulmonary effort is normal.      Breath sounds: Normal breath sounds. No rales.   Chest:      Chest wall: No tenderness.   Abdominal:      General: There is no distension.      Palpations: Abdomen is soft. There is no mass.      Tenderness: There is no abdominal tenderness.   Skin:     General: Skin is warm and dry.      Coloration: Skin is not pale.   Neurological:      Mental Status: He is alert and oriented to person, place, and time.          Significant Labs: All pertinent lab results from the last 24 hours have been reviewed.  Assessment and Plan:     * Paroxysmal atrial fibrillation  Here today for FELICIANO and re-do PVI.   -No absolute contraindications of esophageal stricture, tumor, perforation, laceration,or diverticulum and/or active GI bleed  -The risks, benefits & alternatives of the procedure were explained to the patient.   -The risks of transesophageal echo include but are not limited to:  Dental trauma, esophageal trauma/perforation, bleeding, laryngospasm/brochospasm, aspiration, sore throat/hoarseness, & dislodgement of the endotracheal tube/nasogastric tube (where applicable).    -The risks of ANES monitored sedation include hypotension, respiratory depression, arrhythmias, bronchospasm, & death.    -Informed consent was obtained. The patient is agreeable to proceed with the procedure and all questions and concerns addressed.    Case discussed with an attending in echocardiography lab.    Further recommendations per attending addendum         VTE Risk Mitigation (From admission, onward)      None            Thank you for your consult. I will sign off. Please contact us if you have any additional questions.    Nayely Vital PA-C  Cardiology   [facility] - Short Stay Cardiac Unit

## 2025-06-12 NOTE — SUBJECTIVE & OBJECTIVE
Past Medical History:   Diagnosis Date    Anxiety 07/29/2014    Arthritis     atrial fibrillation     History of cardioversion    Bilateral diabetic retinopathy 2017    Cardioembolic stroke 12/07/2024    almost completely resolved - very mild left hand weakness    CKD (chronic kidney disease)     in transplanted kidney    Colon polyps 2014    Depression - situational 07/29/2014    Diabetes type 2 2000    since 2000.  c/b neuropathy, CKD    Encounter for blood transfusion     History of hepatitis C, s/p successful Rx w/ SVR24 - 9/2017 07/29/2014    Genotype 1a, treatment naive 10/2014 liver biopsy - grade 1 / stage 1 Completed Harvoni w/ SVR    Hyperlipidemia 07/29/2014    Hypertension     Pancreatitis     S/P cadaveric kidney transplant 11/5/2016. ESRD secondary to HTN/DMII 11/05/2016    Stroke 2024       Past Surgical History:   Procedure Laterality Date    ABLATION OF ARRHYTHMOGENIC FOCUS FOR ATRIAL FIBRILLATION N/A 6/11/2024    Procedure: Ablation atrial fibrillation;  Surgeon: Bronson Bowden MD;  Location: [facility] EP LAB;  Service: Cardiology;  Laterality: N/A;  AF, FELICIANO (cx if SR), PVI, RFA, Carto, Gen, GP, 3 Prep    ABLATION, ATRIAL FLUTTER, TYPICAL  6/11/2024    Procedure: Ablation, Atrial Flutter, Typical;  Surgeon: Bronson Bowden MD;  Location: [facility] EP LAB;  Service: Cardiology;;    APPENDECTOMY      BONE MARROW BIOPSY Left 6/26/2024    Procedure: Biopsy-bone marrow;  Surgeon: Bridger Zapata MD;  [facility]  Service: Oncology;  Laterality: Left;  6/24-pt knows to hold aspirin and eliquis as instructed by hem/onc, precall complete-Kpvt    CARDIOVERSION  6/11/2024    Procedure: Cardioversion;  Surgeon: Bronson Bowden MD;  Location: [facility] EP LAB;  Service: Cardiology;;    CATARACT EXTRACTION W/  INTRAOCULAR LENS IMPLANT Right 3/13/2024    Procedure: EXTRACTION, CATARACT, WITH IOL INSERTION;  Surgeon: Jennifer Palacio MD;  Location: [facility] OR;  Service: Ophthalmology;  Laterality:  Right;    CATARACT EXTRACTION W/  INTRAOCULAR LENS IMPLANT Left 4/10/2024    Procedure: EXTRACTION, CATARACT, WITH IOL INSERTION;  Surgeon: Jennifer Palacio MD;  Location: [facility] OR;  Service: Ophthalmology;  Laterality: Left;    COLONOSCOPY N/A 12/21/2020    Procedure: COLONOSCOPY;  Surgeon: Tico Bell MD;  Location: [facility] ENDO (4TH FLR);  Service: Endoscopy;  Laterality: N/A;  ok to hold eliquis per Dr. Valadez, see telephone encounter 11/13/2020-[place]  covid test 12/18 [place]    ECHOCARDIOGRAM,TRANSESOPHAGEAL N/A 2/3/2025    Procedure: Transesophageal echo (FELICIANO) intra-procedure log documentation;  Surgeon: Grayson Lin III, MD;  Location: [facility] EP LAB;  Service: Cardiology;  Laterality: N/A;    KIDNEY TRANSPLANT      TRANSESOPHAGEAL ECHOCARDIOGRAPHY N/A 8/26/2019    Procedure: ECHOCARDIOGRAM, TRANSESOPHAGEAL;  Surgeon: [facility] Diagnostic Provider;  Location: [facility] EP LAB;  Service: Cardiology;  Laterality: N/A;    TRANSESOPHAGEAL ECHOCARDIOGRAPHY N/A 6/11/2024    Procedure: ECHOCARDIOGRAM, TRANSESOPHAGEAL;  Surgeon: Grayson Lin III, MD;  Location: [facility] EP LAB;  Service: Cardiology;  Laterality: N/A;    TREATMENT OF CARDIAC ARRHYTHMIA N/A 8/26/2019    Procedure: CARDIOVERSION;  Surgeon: Bronson Bowden MD;  Location: [facility] EP LAB;  Service: Cardiology;  Laterality: N/A;  AF, FELICIANO, DCCV, MAC, GP, 3 PREP    TREATMENT OF CARDIAC ARRHYTHMIA N/A 2/3/2025    Procedure: Cardioversion or Defibrillation;  Surgeon: Bronson Bowden MD;  Location: [facility] EP LAB;  Service: Cardiology;  Laterality: N/A;  AF, FELICIANO, DCCV, MAC, GP, 3 Prep       Review of patient's allergies indicates:   Allergen Reactions    Nifedipine Other (See Comments)     Gingival hyperplasia       No current facility-administered medications on file prior to encounter.     Current Outpatient Medications on File Prior to Encounter   Medication Sig    atorvastatin (LIPITOR) 80 MG tablet Take 1 tablet (80 mg total) by mouth once daily.    insulin pump  "cart,auto,BT,G6/7 (OMNIPOD 5 G6-G7 PODS, GEN 5,) Crtg Change every 48 hours.    magnesium oxide (MAG-OX) 400 mg (241.3 mg magnesium) tablet Take 1 tablet (400 mg total) by mouth 2 (two) times daily.    predniSONE (DELTASONE) 5 MG tablet Take 1 tablet (5 mg total) by mouth once daily.    rivaroxaban (XARELTO) 15 mg Tab Take 1 tablet (15 mg total) by mouth daily with dinner or evening meal.    tacrolimus (PROGRAF) 1 MG Cap Take 1 capsule (1 mg total) by mouth every 12 (twelve) hours.    blood sugar diagnostic Strp Use to check blood glucose 1 times daily, to use with insurance preferred meter    blood-glucose meter Misc Use to check blood glucose 1 times daily, to use with insurance preferred meter    blood-glucose sensor (DEXCOM G7 SENSOR) Kayla Change sensor every 10 days.    empagliflozin (JARDIANCE) 10 mg tablet Take 1 tablet (10 mg total) by mouth once daily.    [] ergocalciferol (ERGOCALCIFEROL) 50,000 unit Cap Take 1 capsule (50,000 Units total) by mouth every 7 days.    insulin aspart U-100 (NOVOLOG U-100 INSULIN ASPART) 100 unit/mL injection Use in pump, max daily 100 units.    lancets 30 gauge Misc Use to check blood glucose 1 times daily, to use with insurance preferred meter    meclizine (ANTIVERT) 25 mg tablet Take 1 tablet (25 mg total) by mouth 3 (three) times daily as needed for Dizziness.    pen needle, diabetic (BD ULTRA-FINE LO PEN NEEDLE) 32 gauge x 5/32" Ndle USE TO ADMINISTER INSULIN 4 TIMES DAILY.    polyethylene glycol (MIRALAX) 17 gram PwPk Take 17 g by mouth daily as needed for Constipation. Take 17 gm (mixed in liquid) by mouth every day.    [DISCONTINUED] insulin lispro (HUMALOG KWIKPEN INSULIN) 100 unit/mL pen Inject 18 units w/ breakfast, 16 units w/ lunch and dinner plus scale 150-200 +2, 201-250 +4, 251-300 +6, 301-350 +8.     Family History       Problem Relation (Age of Onset)    Cancer Brother    Diabetes Mother    Heart disease Mother, Father    Hypertension Mother, Brother "          Tobacco Use    Smoking status: Former     Current packs/day: 0.00     Average packs/day: 0.5 packs/day for 32.0 years (16.0 ttl pk-yrs)     Types: Cigarettes     Start date: 1984     Quit date: 2016     Years since quittin.5    Smokeless tobacco: Never   Vaping Use    Vaping status: Never Used   Substance and Sexual Activity    Alcohol use: Not Currently    Drug use: No    Sexual activity: Yes     Partners: Female     Review of Systems   Constitutional: Negative for diaphoresis, malaise/fatigue, weight gain and weight loss.   HENT:  Negative for nosebleeds.    Eyes:  Negative for vision loss in left eye, vision loss in right eye and visual disturbance.   Cardiovascular:  Negative for chest pain, claudication, dyspnea on exertion, irregular heartbeat, leg swelling, near-syncope, orthopnea, palpitations, paroxysmal nocturnal dyspnea and syncope.   Respiratory:  Negative for cough, shortness of breath, sleep disturbances due to breathing, snoring and wheezing.    Hematologic/Lymphatic: Negative for bleeding problem. Does not bruise/bleed easily.   Skin:  Negative for poor wound healing and rash.   Musculoskeletal:  Negative for muscle cramps and myalgias.   Gastrointestinal:  Negative for bloating, abdominal pain, diarrhea, heartburn, melena, nausea and vomiting.   Genitourinary:  Negative for hematuria and nocturia.   Neurological:  Negative for brief paralysis, dizziness, headaches, light-headedness, numbness and weakness.   Psychiatric/Behavioral:  Negative for depression.    Allergic/Immunologic: Negative for hives.     Objective:     Vital Signs (Most Recent):  Temp: 97.5 °F (36.4 °C) (25 0850)  Pulse: (!) 57 (25 0850)  Resp: 18 (25 0850)  BP: (!) 177/84 (25 0853)  SpO2: 98 % (25 0850) Vital Signs (24h Range):  Temp:  [97.5 °F (36.4 °C)] 97.5 °F (36.4 °C)  Pulse:  [57] 57  Resp:  [18] 18  SpO2:  [98 %] 98 %  BP: (177-190)/(84-86) 177/84     Weight: 84.4 kg  (186 lb)  Body mass index is 24.54 kg/m².    SpO2: 98 %       No intake or output data in the 24 hours ending 06/12/25 0946    Lines/Drains/Airways       Peripheral Intravenous Line  Duration                  Peripheral IV - Single Lumen 06/12/25 0900 20 G Left Antecubital <1 day         Peripheral IV - Single Lumen 06/12/25 0910 20 G Posterior;Right Hand <1 day                     Physical Exam  Vitals and nursing note reviewed.   Constitutional:       Appearance: He is well-developed. He is not diaphoretic.   HENT:      Head: Normocephalic and atraumatic.   Eyes:      Conjunctiva/sclera: Conjunctivae normal.   Neck:      Vascular: No carotid bruit or JVD.   Cardiovascular:      Rate and Rhythm: Normal rate and regular rhythm.      Pulses: Normal pulses and intact distal pulses.      Heart sounds: Normal heart sounds. No murmur heard.     No friction rub. No gallop.   Pulmonary:      Effort: Pulmonary effort is normal.      Breath sounds: Normal breath sounds. No rales.   Chest:      Chest wall: No tenderness.   Abdominal:      General: There is no distension.      Palpations: Abdomen is soft. There is no mass.      Tenderness: There is no abdominal tenderness.   Skin:     General: Skin is warm and dry.      Coloration: Skin is not pale.   Neurological:      Mental Status: He is alert and oriented to person, place, and time.          Significant Labs: All pertinent lab results from the last 24 hours have been reviewed.

## 2025-06-12 NOTE — SUBJECTIVE & OBJECTIVE
Past Medical History:   Diagnosis Date    Anxiety 07/29/2014    Arthritis     atrial fibrillation     History of cardioversion    Bilateral diabetic retinopathy 2017    Cardioembolic stroke 12/07/2024    almost completely resolved - very mild left hand weakness    CKD (chronic kidney disease)     in transplanted kidney    Colon polyps 2014    Depression - situational 07/29/2014    Diabetes type 2 2000    since 2000.  c/b neuropathy, CKD    Encounter for blood transfusion     History of hepatitis C, s/p successful Rx w/ SVR24 - 9/2017 07/29/2014    Genotype 1a, treatment naive 10/2014 liver biopsy - grade 1 / stage 1 Completed Harvoni w/ SVR    Hyperlipidemia 07/29/2014    Hypertension     Pancreatitis     S/P cadaveric kidney transplant 11/5/2016. ESRD secondary to HTN/DMII 11/05/2016    Stroke 2024       Past Surgical History:   Procedure Laterality Date    ABLATION OF ARRHYTHMOGENIC FOCUS FOR ATRIAL FIBRILLATION N/A 6/11/2024    Procedure: Ablation atrial fibrillation;  Surgeon: Bronson Bowden MD;  Location: [facility] EP LAB;  Service: Cardiology;  Laterality: N/A;  AF, FELICIANO (cx if SR), PVI, RFA, Carto, Gen, GP, 3 Prep    ABLATION, ATRIAL FLUTTER, TYPICAL  6/11/2024    Procedure: Ablation, Atrial Flutter, Typical;  Surgeon: Bronson Bowden MD;  Location: [facility] EP LAB;  Service: Cardiology;;    APPENDECTOMY      BONE MARROW BIOPSY Left 6/26/2024    Procedure: Biopsy-bone marrow;  Surgeon: Bridger Zapata MD;  [facility]O [facility]  Service: Oncology;  Laterality: Left;  6/24-pt knows to hold aspirin and eliquis as instructed by hem/onc, precall complete-Kpvt    CARDIOVERSION  6/11/2024    Procedure: Cardioversion;  Surgeon: Bronson Bowden MD;  Location: [facility] EP LAB;  Service: Cardiology;;    CATARACT EXTRACTION W/  INTRAOCULAR LENS IMPLANT Right 3/13/2024    Procedure: EXTRACTION, CATARACT, WITH IOL INSERTION;  Surgeon: Jennifer Palacio MD;  [facility];  Service: Ophthalmology;  Laterality:  Right;    CATARACT EXTRACTION W/  INTRAOCULAR LENS IMPLANT Left 4/10/2024    Procedure: EXTRACTION, CATARACT, WITH IOL INSERTION;  Surgeon: Jennifer Palacio MD;  Location: [facility] OR;  Service: Ophthalmology;  Laterality: Left;    COLONOSCOPY N/A 12/21/2020    Procedure: COLONOSCOPY;  Surgeon: Tico Bell MD;  Location: [facility] ENDO (4TH FLR);  Service: Endoscopy;  Laterality: N/A;  ok to hold eliquis per Dr. Valadez, see telephone encounter 11/13/2020-MS  covid test 12/18 Garvin    ECHOCARDIOGRAM,TRANSESOPHAGEAL N/A 2/3/2025    Procedure: Transesophageal echo (FELICIANO) intra-procedure log documentation;  Surgeon: Grayson Lin III, MD;  Location: [facility] EP LAB;  Service: Cardiology;  Laterality: N/A;    KIDNEY TRANSPLANT      TRANSESOPHAGEAL ECHOCARDIOGRAPHY N/A 8/26/2019    Procedure: ECHOCARDIOGRAM, TRANSESOPHAGEAL;  Surgeon: [facility] Diagnostic Provider;  Location: [facility] EP LAB;  Service: Cardiology;  Laterality: N/A;    TRANSESOPHAGEAL ECHOCARDIOGRAPHY N/A 6/11/2024    Procedure: ECHOCARDIOGRAM, TRANSESOPHAGEAL;  Surgeon: Grayson Lin III, MD;  Location: [facility] EP LAB;  Service: Cardiology;  Laterality: N/A;    TREATMENT OF CARDIAC ARRHYTHMIA N/A 8/26/2019    Procedure: CARDIOVERSION;  Surgeon: Bronson Bowden MD;  Location: [facility] EP LAB;  Service: Cardiology;  Laterality: N/A;  AF, FELICIANO, DCCV, MAC, GP, 3 PREP    TREATMENT OF CARDIAC ARRHYTHMIA N/A 2/3/2025    Procedure: Cardioversion or Defibrillation;  Surgeon: Bronson Bowden MD;  Location: [facility] EP LAB;  Service: Cardiology;  Laterality: N/A;  AF, FELICIANO, DCCV, MAC, GP, 3 Prep       Review of patient's allergies indicates:   Allergen Reactions    Nifedipine Other (See Comments)     Gingival hyperplasia       No current facility-administered medications on file prior to encounter.     Current Outpatient Medications on File Prior to Encounter   Medication Sig    atorvastatin (LIPITOR) 80 MG tablet Take 1 tablet (80 mg total) by mouth once daily.    insulin pump  "cart,auto,BT,G6/7 (OMNIPOD 5 G6-G7 PODS, GEN 5,) Crtg Change every 48 hours.    magnesium oxide (MAG-OX) 400 mg (241.3 mg magnesium) tablet Take 1 tablet (400 mg total) by mouth 2 (two) times daily.    predniSONE (DELTASONE) 5 MG tablet Take 1 tablet (5 mg total) by mouth once daily.    rivaroxaban (XARELTO) 15 mg Tab Take 1 tablet (15 mg total) by mouth daily with dinner or evening meal.    tacrolimus (PROGRAF) 1 MG Cap Take 1 capsule (1 mg total) by mouth every 12 (twelve) hours.    blood sugar diagnostic Strp Use to check blood glucose 1 times daily, to use with insurance preferred meter    blood-glucose meter Misc Use to check blood glucose 1 times daily, to use with insurance preferred meter    blood-glucose sensor (DEXCOM G7 SENSOR) Kayla Change sensor every 10 days.    empagliflozin (JARDIANCE) 10 mg tablet Take 1 tablet (10 mg total) by mouth once daily.    [] ergocalciferol (ERGOCALCIFEROL) 50,000 unit Cap Take 1 capsule (50,000 Units total) by mouth every 7 days.    insulin aspart U-100 (NOVOLOG U-100 INSULIN ASPART) 100 unit/mL injection Use in pump, max daily 100 units.    lancets 30 gauge Misc Use to check blood glucose 1 times daily, to use with insurance preferred meter    meclizine (ANTIVERT) 25 mg tablet Take 1 tablet (25 mg total) by mouth 3 (three) times daily as needed for Dizziness.    pen needle, diabetic (BD ULTRA-FINE LO PEN NEEDLE) 32 gauge x 5/32" Ndle USE TO ADMINISTER INSULIN 4 TIMES DAILY.    polyethylene glycol (MIRALAX) 17 gram PwPk Take 17 g by mouth daily as needed for Constipation. Take 17 gm (mixed in liquid) by mouth every day.    [DISCONTINUED] insulin lispro (HUMALOG KWIKPEN INSULIN) 100 unit/mL pen Inject 18 units w/ breakfast, 16 units w/ lunch and dinner plus scale 150-200 +2, 201-250 +4, 251-300 +6, 301-350 +8.     Family History       Problem Relation (Age of Onset)    Cancer Brother    Diabetes Mother    Heart disease Mother, Father    Hypertension Mother, Brother "          Tobacco Use    Smoking status: Former     Current packs/day: 0.00     Average packs/day: 0.5 packs/day for 32.0 years (16.0 ttl pk-yrs)     Types: Cigarettes     Start date: 1984     Quit date: 2016     Years since quittin.5    Smokeless tobacco: Never   Vaping Use    Vaping status: Never Used   Substance and Sexual Activity    Alcohol use: Not Currently    Drug use: No    Sexual activity: Yes     Partners: Female     Review of Systems   All other systems reviewed and are negative.    Objective:     Vital Signs (Most Recent):  Temp: 97.5 °F (36.4 °C) (25 0850)  Pulse: (!) 57 (25 0850)  Resp: 18 (25 0850)  BP: (!) 177/84 (25 0853)  SpO2: 98 % (25 0850) Vital Signs (24h Range):  Temp:  [97.5 °F (36.4 °C)] 97.5 °F (36.4 °C)  Pulse:  [57] 57  Resp:  [18] 18  SpO2:  [98 %] 98 %  BP: (177-190)/(84-86) 177/84       Weight: 84.4 kg (186 lb)  Body mass index is 24.54 kg/m².    SpO2: 98 %        Physical Exam  Vitals and nursing note reviewed.   Constitutional:       Appearance: Normal appearance.   Cardiovascular:      Rate and Rhythm: Normal rate and regular rhythm.      Pulses: Normal pulses.      Heart sounds: Normal heart sounds.   Pulmonary:      Effort: Pulmonary effort is normal.      Breath sounds: Normal breath sounds.   Abdominal:      General: Abdomen is flat.   Musculoskeletal:      Right lower leg: No edema.      Left lower leg: No edema.   Skin:     General: Skin is warm.   Neurological:      General: No focal deficit present.      Mental Status: He is alert.            Significant Labs: All pertinent lab results from the last 24 hours have been reviewed.

## 2025-06-12 NOTE — PROGRESS NOTES
C3 nurse attempted to contact Ravi Zamorano for a TCC post hospital discharge follow up call. No answer. LVM requesting a callback at [phone].    The patient does not have a scheduled HOSFU appointment. Message sent to PCP's staff to assist with HOSFU appointment scheduling.

## 2025-06-12 NOTE — NURSING
Patient with brisk bleeding from right groin venotomy site when suture removed at 1645. Manual compression applied for 15 minutes. Tract ooze  also noted. Dr. Red notified and to bedside, Gauze/tegaderm dressing applied at 1710. Bedrest extended until 1800. Spouse at bedside. Will monitor.

## 2025-06-12 NOTE — ANESTHESIA PROCEDURE NOTES
Arterial    Diagnosis: AFib    Patient location during procedure: done in OR    Staffing  Authorizing Provider: Alex Renee MD  Performing Provider: Alex Renee MD    Staffing  Performed by: Alex Renee MD  Authorized by: Alex Renee MD    Anesthesiologist was present at the time of the procedure.    Preanesthetic Checklist  Completed: patient identified, IV checked, site marked, risks and benefits discussed, surgical consent, monitors and equipment checked, pre-op evaluation, timeout performed and anesthesia consent givenArterial  Skin Prep: alcohol swabs  Local Infiltration: none  Orientation: right  Location: radial    Catheter Size: 20 G  Catheter placement by Ultrasound guidance. Heme positive aspiration all ports.   Vessel Caliber: medium, patent, compressibility normal  Vascular Doppler:  not done  Needle advanced into vessel with real time Ultrasound guidance.Insertion Attempts: 1  Assessment  Dressing: secured with tape and tegaderm  Patient: Tolerated well

## 2025-06-12 NOTE — PROGRESS NOTES
Patient admitted to recovery see [facility] for complete assessment pacu bcg's maintained safety measures verified patient instructed on pain scale and patient verbalized understanding. Called for EKG and it was done and in chart. Also called patient's wife and updated on patient location with the permission of patient. Blood sugar 130.

## 2025-06-12 NOTE — HPI
Mr. Zamorano is a 70 y/o with a PMHx of atrial fibrillation s/p CTI/PVI/PWI 6/11/2024, HFmrEF, hypertension, hyperlipidemia, PAD, IDDM, Hx of CVA, s/p kidney transplant in 2016, CKD Stage 3b, anemia. His last cardioversion was on 2/3/25. He had recurrence of AF/AF shortly afterwards. He has had several hospital admissions in the past few months, mostly for pneumonia, but most recent admission for ADHF. He was diuresed with IV Lasix, and BP meds adjusted. He was also started on Metoprolol for AF with RVR. He was discharged last week. He presents today for FELICIANO and re-do PVI/AFL RFA.    TTE 6/4/2025    Left Ventricle: The left ventricle is normal in size. Ventricular mass is normal. Normal wall thickness. Normal wall motion. There is normal systolic function with a visually estimated ejection fraction of 55 - 60%. Ejection fraction is approximately 60%.    Right Ventricle: Systolic function is borderline low.    Left Atrium: The left atrium is severely dilated    Right Atrium: The right atrium is mildly dilated .    Aortic Valve: There is mild annular calcification present. There is mild aortic regurgitation.    Tricuspid Valve: There is mild regurgitation.    Pulmonary Artery: The estimated pulmonary artery systolic pressure is 36 mmHg.    IVC/SVC: Elevated venous pressure at 15 mmHg.     FELICIANO 2/3/2025    FELICIANO performed prior to DCCV.    Left Atrium: The left atrial appendage has a cauliflower morphology. Appendage velocity is reduced at less than 40 cm/sec. There is no thrombus in the left atrial appendage. The pulmonary veins are normal in size with systolic blunting.    Left Ventricle: The left ventricle is normal in size. Normal wall thickness. There is mildly reduced systolic function with a visually estimated ejection fraction of 40 - 45%.    Right Ventricle: Normal right ventricular cavity size. Systolic function is normal.    Aortic Valve: There is mild aortic regurgitation.    Mitral Valve: There is mild  regurgitation.    Tricuspid Valve: There is mild regurgitation.    Aorta: Ascending aorta is mildly dilated measuring 3.9 cm.    Anticoagulant/antiplatelets: Xarelto 15 mg daily   ECG: Sinus bradycardia     Dysphagia or odynophagia:  No  Liver Disease, esophageal disease, or known varices:  No  Upper GI Bleeding: No  Snoring:  No  Sleep Apnea:  No  Prior neck surgery or radiation:  No  Able to move neck in all directions:  Yes  History of anesthetic difficulties:  No  Family history of anesthetic difficulties:  No  Last oral intake: yesterday before midnight  GLP-1 use: None    Platelet count: 179k  INR: 1.4

## 2025-06-12 NOTE — PLAN OF CARE
Patient received back to [facility] room 4 post procedure. Report received from Leslie Anderson RN Patient is AAOx3. VSS. Bedrest instructions reviewed with patient. All questions answered. Bilateral groin with suture/stopcock intact.no bleeding noted. No hematoma. + pedal pulses palpable. Condom catheter in place. Tele monitor on., Call light placed within reach.

## 2025-06-12 NOTE — NURSING TRANSFER
Nursing Transfer Note      6/12/2025   3:40 PM    Nurse giving handoff:betty olea   Nurse receiving handoff:maximiliano olea     Reason patient is being transferred: d/c critieria met     Transfer To: sscu 4    Transfer via stretcher    Transfer with cardiac monitoring    Transported by ([id number]) registered and verified able to see patient on monitor     Transfer Vital Signs:  Blood Pressure:see epic   Heart Rate:see epic   O2:room air   Temperature:see epic   Respirations:see epic     Telemetry: yes   Order for Tele Monitor? Yes     Additional Lines: n/a went over suture removal time with maximiliano rn 1630 and bedrest ends 1730 and patient to get iv lasix when ambulate . Also called dr. Tam and let him know patient' vs (b/p) he is okay with patient going to sscu.     Medicines sent: n/a     Any special needs or follow-up needed: see above     Patient belongings transferred with patient: n/a     Chart send with patient: yes     Notified: reported to maximiliano olea     Patient reassessed at: see epic  (date, time)  1  Upon arrival to floor: to room no complaints no distress noted.

## 2025-06-12 NOTE — ANESTHESIA PROCEDURE NOTES
Intubation    Date/Time: 6/12/2025 10:17 AM    Performed by: Argelia Gotti CRNA  Authorized by: Alex Renee MD    Intubation:     Induction:  Intravenous    Intubated:  Postinduction    Mask Ventilation:  Not attempted    Attempts:  1    Attempted By:  CRNA    Method of Intubation:  Video laryngoscopy    Blade:  Faye 3    Laryngeal View Grade: Grade I - full view of cords      Difficult Airway Encountered?: No      Complications:  None    Airway Device:  Oral endotracheal tube    Airway Device Size:  7.5    Style/Cuff Inflation:  Cuffed (inflated to minimal occlusive pressure)    Tube secured:  23    Secured at:  The lips    Placement Verified By:  Capnometry    Complicating Factors:  None    Findings Post-Intubation:  BS equal bilateral and atraumatic/condition of teeth unchanged  Notes:      Recommend Faye 4

## 2025-06-12 NOTE — ASSESSMENT & PLAN NOTE
68 yo M with PMH of CKD 3-4, history of kidney transplant, chronic combined CHF (FELICIANO 2/3/25 EF 40-45%, TTE 12/8/24 G3DD), CVA right MCA 12/24 with minimal residual LLE and left hand weakness, DM2 A1c 8.6 on 01/27, atrial fib/flutter s/p PVI/PWI/CTI here for redo ablation.    Our discussion included, but was not limited to the risk of death, infection, bleeding, stroke, MI, cardiac perforation, embolism, cardiac tamponade, vascular injury, valvular injury, AE fistula (RFA), injury to phrenic nerve (RFA), pulmonary vein stenosis (RFA), rhabdomyolysis (PFA), hemolysis (PFA) and other organic injury including the possibility for need for surgery or pacemaker implantation. Patient verbalized understanding and wishes to proceed. All questions and concerns were answered. Consents signed.

## 2025-06-12 NOTE — TRANSFER OF CARE
"Anesthesia Transfer of Care Note    Patient: Ravi Zamorano    Procedure(s) Performed: Procedure(s) (LRB):  Ablation atrial fibrillation (N/A)  Ablation, Atrial Flutter, Typical (N/A)  ECHOCARDIOGRAM, TRANSESOPHAGEAL (N/A)  Ablation, Atrial Flutter, Atypical    Patient location: Cath Lab    Anesthesia Type: general    Transport from OR: Transported from OR on 2-3 L/min O2 by NC with adequate spontaneous ventilation    Post pain: adequate analgesia    Post assessment: no apparent anesthetic complications    Post vital signs: stable    Level of consciousness: awake    Nausea/Vomiting: no nausea/vomiting    Complications: none    Transfer of care protocol was followed      Last vitals: Visit Vitals  BP (!) 177/84 (BP Location: Left arm, Patient Position: Lying)   Pulse (!) 57   Temp 36.4 °C (97.5 °F) (Temporal)   Resp 18   Ht 6' 1" (1.854 m)   Wt 84.4 kg (186 lb)   SpO2 98%   BMI 24.54 kg/m²     "

## 2025-06-12 NOTE — HOSPITAL COURSE
Patient underwent successful RFA for treatment of atypical flutter localized to  right PVs. Also, reisolated right Pvs as well as reinforced CTI . No evidence of intra- or post-procedure complications. Post-ablation ECG shows NSR, and no acute abnormalities.     EP medications at discharge:  Antiarrhythmics and/or AVN agents: unchanged. Not on AAD.  Patient was instructed to continue their oral anticoagulation as previously prescribed  Patient was instructed to take ibuprofen 800 mg TID x 3 days for pericarditis.     Groin access sites without hematoma or bleeding. Activity restrictions given to patient. Instructed to seek medical attention for shortness of breath, chest discomfort not alleviated with NSAIDs, bleeding/hematoma formation at the access sites, acute onset of neurologic symptoms, N/V, or hematemesis. At discharge the patient denied CP, SOB, access site bleeding/hematoma, or any other complaints or evidence of complications.    ----------------------------------------------------------------------------------------

## 2025-06-12 NOTE — DISCHARGE SUMMARY
[facility] - Short Stay Cardiac Unit  Cardiac Electrophysiology  Discharge Summary      Patient Name: Ravi Zamorano  MRN: [id number]  Admission Date: 6/12/2025  Hospital Length of Stay: 0 days  Discharge Date and Time: 06/12/2025 5:24 PM  Attending Physician: Bronson Bowden MD    Discharging Provider: Celia Red MD  Primary Care Physician: Mima Mack MD    Hospital Course:  Patient underwent successful RFA for treatment of atypical flutter localized to  right PVs. Also, reisolated right Pvs as well as reinforced CTI . No evidence of intra- or post-procedure complications. Post-ablation ECG shows NSR, and no acute abnormalities.     EP medications at discharge:  Antiarrhythmics and/or AVN agents: unchanged. Not on AAD.  Patient was instructed to continue their oral anticoagulation as previously prescribed  Patient was instructed to take ibuprofen 800 mg TID x 3 days for pericarditis.     Groin access sites without hematoma or bleeding. Activity restrictions given to patient. Instructed to seek medical attention for shortness of breath, chest discomfort not alleviated with NSAIDs, bleeding/hematoma formation at the access sites, acute onset of neurologic symptoms, N/V, or hematemesis. At discharge the patient denied CP, SOB, access site bleeding/hematoma, or any other complaints or evidence of complications.    HPI:   68 yo M with PMH of CKD 3-4, history of kidney transplant, chronic combined CHF (FELICIANO 2/3/25 EF 40-45%, TTE 12/8/24 G3DD), CVA right MCA 12/24 with minimal residual LLE and left hand weakness, DM2 A1c 8.6 on 01/27, atrial fib/flutter s/p DCCV 02/03/2025 here for redo ablation.      EP background   Patient with Hx of persistent AF, underwent DCCV and flecainide initiation in 8/2019. Over the past several months he has had recurrent symptomatic AF. He underwent DCCV in 8/2019, and was started on flecainide 100 mg bid post-procedure.  QRS wide on ECG--flecainide reduced to 50 mg bid. By  5/2023 QRS had narrowed, although he had an episode of syncope in 5/2023 thought to be due to hypotension 2/2 chlorthalidone. SPECT 5/15/2023 negative for ischemia or scar. Event monitor 1/2024--one episode of AF noted 12/24/2023, and episodes of SR and junctional rhythm with HR ranging from 54-74. S/p CTI/PVI/LA PWI with Dr. Bowden in June 2024 (PVI with LA posterior wall isolation). HV interval post ablation 53ms.  Multaq discontinue as pt remained NSR after the procedure. Only on coreg 25 BID. Had another FELICIANO/cardioversion 2/23/25 with plan to redo ablation outpatient.     Procedure(s) (LRB):  Ablation atrial fibrillation (N/A)  Ablation, Atrial Flutter, Typical (N/A)  ECHOCARDIOGRAM, TRANSESOPHAGEAL (N/A)  Ablation, Atrial Flutter, Atypical         Goals of Care Treatment Preferences:  Code Status: Full Code      Final Active Diagnoses:    Diagnosis Date Noted POA    PRINCIPAL PROBLEM:  Paroxysmal atrial fibrillation [I48.0] 06/12/2025 Unknown      Problems Resolved During this Admission:     No new Assessment & Plan notes have been filed under this hospital service since the last note was generated.  Service: Arrhythmia      Discharged Condition: stable    Disposition:     Follow Up:   Follow-up Information       Bronson Bowden MD Follow up in 3 month(s).    Specialties: Electrophysiology, Cardiovascular Disease, Cardiology  Contact information:  [facility] [address]  [phone]                           Patient Instructions:   No discharge procedures on file.  Medications:  Reconciled Home Medications:      Medication List        PAUSE taking these medications      eplerenone 50 MG Tab  Wait to take this until your doctor or other care provider tells you to start again.  Commonly known as: INSPRA  Take 1 tablet (50 mg total) by mouth once daily.            CONTINUE taking these medications      acetaminophen 500 MG tablet  Commonly known as: TYLENOL  Take 500 mg by mouth every 6 (six)  "hours as needed for Pain.     aspirin 81 mg Tab  Take 81 mg by mouth once daily.     atorvastatin 80 MG tablet  Commonly known as: LIPITOR  Take 1 tablet (80 mg total) by mouth once daily.     BD ULTRA-FINE LO PEN NEEDLE 32 gauge x 5/32" Ndle  Generic drug: pen needle, diabetic  USE TO ADMINISTER INSULIN 4 TIMES DAILY.     DEXCOM G7 SENSOR Kayla  Generic drug: blood-glucose sensor  Change sensor every 10 days.     doxazosin 8 MG Tab  Commonly known as: CARDURA  Take 1 tablet (8 mg total) by mouth once daily.     ENTRESTO 49-51 mg per tablet  Generic drug: sacubitriL-valsartan  Take 1 tablet by mouth 2 (two) times daily.     famotidine 20 MG tablet  Commonly known as: PEPCID  Take 1 tablet by mouth daily as needed (Acid reflux).     gabapentin 300 MG capsule  Commonly known as: NEURONTIN  Take 1 capsule (300 mg total) by mouth 2 (two) times daily.     isosorbide-hydrALAZINE 20-37.5 mg 20-37.5 mg Tab  Commonly known as: BIDIL  Take 1 tablet by mouth 3 (three) times daily.     JARDIANCE 10 mg tablet  Generic drug: empagliflozin  Take 1 tablet (10 mg total) by mouth once daily.     magnesium oxide 400 mg (241.3 mg magnesium) tablet  Commonly known as: MAG-OX  Take 1 tablet (400 mg total) by mouth 2 (two) times daily.     meclizine 25 mg tablet  Commonly known as: ANTIVERT  Take 1 tablet (25 mg total) by mouth 3 (three) times daily as needed for Dizziness.     melatonin 3 mg tablet  Commonly known as: MELATIN  Take 2 tablets (6 mg total) by mouth nightly as needed for Insomnia.     metoprolol tartrate 25 MG tablet  Commonly known as: LOPRESSOR  Take 1 tablet (25 mg total) by mouth 2 (two) times daily.     MIRALAX 17 gram Pwpk  Generic drug: polyethylene glycol  Take 17 g by mouth daily as needed for Constipation. Take 17 gm (mixed in liquid) by mouth every day.     mycophenolate 250 mg Cap  Commonly known as: CELLCEPT  Take 500 mg by mouth 2 (two) times daily.     NovoLOG U-100 Insulin aspart 100 unit/mL " injection  Generic drug: insulin aspart U-100  Use in pump, max daily 100 units.     OMNIPOD 5 G6-G7 PODS (GEN 5) Crtg  Generic drug: insulin pump cart,auto,BT,G6/7  Change every 48 hours.     predniSONE 5 MG tablet  Commonly known as: DELTASONE  Take 1 tablet (5 mg total) by mouth once daily.     tacrolimus 1 MG Cap  Commonly known as: PROGRAF  Take 1 capsule (1 mg total) by mouth every 12 (twelve) hours.     torsemide 20 MG Tab  Commonly known as: DEMADEX  Take 3 tablets (60 mg total) by mouth once daily.     TRUE METRIX GLUCOSE METER Misc  Generic drug: blood-glucose meter  Use to check blood glucose 1 times daily, to use with insurance preferred meter     TRUE METRIX GLUCOSE TEST STRIP Strp  Generic drug: blood sugar diagnostic  Use to check blood glucose 1 times daily, to use with insurance preferred meter     TRUEPLUS LANCETS 30 gauge Misc  Generic drug: lancets  Use to check blood glucose 1 times daily, to use with insurance preferred meter     XARELTO 15 mg Tab  Generic drug: rivaroxaban  Take 1 tablet (15 mg total) by mouth daily with dinner or evening meal.            ASK your doctor about these medications      VITAMIN D2 1,250 mcg (50,000 unit) capsule  Generic drug: ergocalciferol  Take 1 capsule (50,000 Units total) by mouth every 7 days.  Ask about: Should I take this medication?              Time spent on the discharge of patient: 45 minutes    Celia Red MD  Cardiac Electrophysiology  [facility] - Short Stay Cardiac Unit

## 2025-06-12 NOTE — ASSESSMENT & PLAN NOTE
Here today for FELICIANO and re-do PVI.   -No absolute contraindications of esophageal stricture, tumor, perforation, laceration,or diverticulum and/or active GI bleed  -The risks, benefits & alternatives of the procedure were explained to the patient.   -The risks of transesophageal echo include but are not limited to:  Dental trauma, esophageal trauma/perforation, bleeding, laryngospasm/brochospasm, aspiration, sore throat/hoarseness, & dislodgement of the endotracheal tube/nasogastric tube (where applicable).    -The risks of ANES monitored sedation include hypotension, respiratory depression, arrhythmias, bronchospasm, & death.    -Informed consent was obtained. The patient is agreeable to proceed with the procedure and all questions and concerns addressed.    Case discussed with an attending in echocardiography lab.    Further recommendations per attending addendum

## 2025-06-12 NOTE — ANESTHESIA PREPROCEDURE EVALUATION
06/12/2025  Ravi Zamorano is a 69 y.o., male presenting for Afib ablation      Encounter Diagnoses   Name Primary?    History of CVA (cerebrovascular accident)     Persistent atrial fibrillation     Typical atrial flutter        Review of patient's allergies indicates:   Allergen Reactions    Nifedipine Other (See Comments)     Gingival hyperplasia       No medications prior to admission.            Current Facility-Administered Medications on File Prior to Encounter   Medication Dose Route Frequency Provider Last Rate Last Admin    epoetin tanya injection 4,000 Units  4,000 Units Subcutaneous Q7 Days          Current Outpatient Medications on File Prior to Encounter   Medication Sig Dispense Refill    atorvastatin (LIPITOR) 80 MG tablet Take 1 tablet (80 mg total) by mouth once daily. 90 tablet 3    blood sugar diagnostic Strp Use to check blood glucose 1 times daily, to use with insurance preferred meter 100 each 11    blood-glucose meter Misc Use to check blood glucose 1 times daily, to use with insurance preferred meter 1 each 0    blood-glucose sensor (DEXCOM G7 SENSOR) Kayla Change sensor every 10 days. 3 each 11    empagliflozin (JARDIANCE) 10 mg tablet Take 1 tablet (10 mg total) by mouth once daily. 90 tablet 0    insulin aspart U-100 (NOVOLOG U-100 INSULIN ASPART) 100 unit/mL injection Use in pump, max daily 100 units. 30 mL 11    insulin pump cart,auto,BT,G6/7 (OMNIPOD 5 G6-G7 PODS, GEN 5,) Crtg Change every 48 hours. 45 each 3    lancets 30 gauge Misc Use to check blood glucose 1 times daily, to use with insurance preferred meter 100 each 3    magnesium oxide (MAG-OX) 400 mg (241.3 mg magnesium) tablet Take 1 tablet (400 mg total) by mouth 2 (two) times daily. 60 tablet 11    meclizine (ANTIVERT) 25 mg tablet Take 1 tablet (25 mg total) by mouth 3 (three) times daily as needed for Dizziness. 21  "tablet 3    pen needle, diabetic (BD ULTRA-FINE LO PEN NEEDLE) 32 gauge x 5/32" Ndle USE TO ADMINISTER INSULIN 4 TIMES DAILY. 400 each 3    polyethylene glycol (MIRALAX) 17 gram PwPk Take 17 g by mouth daily as needed for Constipation. Take 17 gm (mixed in liquid) by mouth every day.      predniSONE (DELTASONE) 5 MG tablet Take 1 tablet (5 mg total) by mouth once daily. 30 tablet 11    rivaroxaban (XARELTO) 15 mg Tab Take 1 tablet (15 mg total) by mouth daily with dinner or evening meal. 30 tablet 11    tacrolimus (PROGRAF) 1 MG Cap Take 1 capsule (1 mg total) by mouth every 12 (twelve) hours. 60 capsule 11    [DISCONTINUED] insulin lispro (HUMALOG KWIKPEN INSULIN) 100 unit/mL pen Inject 18 units w/ breakfast, 16 units w/ lunch and dinner plus scale 150-200 +2, 201-250 +4, 251-300 +6, 301-350 +8. 30 mL 4       Past Medical History:  07/29/2014: Anxiety  No date: Arthritis  No date: atrial fibrillation      Comment:  History of cardioversion  2017: Bilateral diabetic retinopathy  12/07/2024: Cardioembolic stroke      Comment:  almost completely resolved - very mild left hand                weakness  No date: CKD (chronic kidney disease)      Comment:  in transplanted kidney  2014: Colon polyps  07/29/2014: Depression - situational  2000: Diabetes type 2      Comment:  since 2000.  c/b neuropathy, CKD  No date: Encounter for blood transfusion  07/29/2014: History of hepatitis C, s/p successful Rx w/ SVR24 - 9/ 2017      Comment:  Genotype 1a, treatment naive 10/2014 liver biopsy -                grade 1 / stage 1 Completed Harvoni w/ SVR  07/29/2014: Hyperlipidemia  No date: Hypertension  No date: Pancreatitis  11/05/2016: S/P cadaveric kidney transplant 11/5/2016. ESRD secondary   to HTN/DMII    Past Surgical History:   Procedure Laterality Date    ABLATION OF ARRHYTHMOGENIC FOCUS FOR ATRIAL FIBRILLATION N/A 6/11/2024    Procedure: Ablation atrial fibrillation;  Surgeon: Bronson Bowden MD;  Location: [facility]" LAB;  Service: Cardiology;  Laterality: N/A;  AF, FELICIANO (cx if SR), PVI, RFA, Carto, Gen, GP, 3 Prep    ABLATION, ATRIAL FLUTTER, TYPICAL  6/11/2024    Procedure: Ablation, Atrial Flutter, Typical;  Surgeon: Bronson Bowden MD;  Location: [facility] EP LAB;  Service: Cardiology;;    APPENDECTOMY      BONE MARROW BIOPSY Left 6/26/2024    Procedure: Biopsy-bone marrow;  Surgeon: Bridger Zapata MD;  Location: [facility] ENDO (4TH FLR);  Service: Oncology;  Laterality: Left;  6/24-pt knows to hold aspirin and eliquis as instructed by hem/onc, precall complete-Kpvt    CARDIOVERSION  6/11/2024    Procedure: Cardioversion;  Surgeon: Bronson Bowden MD;  Location: [facility] EP LAB;  Service: Cardiology;;    CATARACT EXTRACTION W/  INTRAOCULAR LENS IMPLANT Right 3/13/2024    Procedure: EXTRACTION, CATARACT, WITH IOL INSERTION;  Surgeon: Jennifer Palacio MD;  Location: [facility] OR;  Service: Ophthalmology;  Laterality: Right;    CATARACT EXTRACTION W/  INTRAOCULAR LENS IMPLANT Left 4/10/2024    Procedure: EXTRACTION, CATARACT, WITH IOL INSERTION;  Surgeon: Jennifer Palacio MD;  Location: [facility] OR;  Service: Ophthalmology;  Laterality: Left;    COLONOSCOPY N/A 12/21/2020    Procedure: COLONOSCOPY;  Surgeon: Tico Bell MD;  Location: [facility] ENDO (4TH FLR);  Service: Endoscopy;  Laterality: N/A;  ok to hold eliquis per Dr. Valadez, see telephone encounter 11/13/2020-MS  covid test 12/18 Price    ECHOCARDIOGRAM,TRANSESOPHAGEAL N/A 2/3/2025    Procedure: Transesophageal echo (FELICIANO) intra-procedure log documentation;  Surgeon: Grayson Lin III, MD;  Location: [facility] EP LAB;  Service: Cardiology;  Laterality: N/A;    KIDNEY TRANSPLANT      TRANSESOPHAGEAL ECHOCARDIOGRAPHY N/A 8/26/2019    Procedure: ECHOCARDIOGRAM, TRANSESOPHAGEAL;  Surgeon: [facility] Diagnostic Provider;  Location: [facility] EP LAB;  Service: Cardiology;  Laterality: N/A;    TRANSESOPHAGEAL ECHOCARDIOGRAPHY N/A 6/11/2024    Procedure: ECHOCARDIOGRAM, TRANSESOPHAGEAL;  Surgeon:  "Grayson Lin III, MD;  Location: [facility] EP LAB;  Service: Cardiology;  Laterality: N/A;    TREATMENT OF CARDIAC ARRHYTHMIA N/A 2019    Procedure: CARDIOVERSION;  Surgeon: Bronson Bowden MD;  Location: [facility] EP LAB;  Service: Cardiology;  Laterality: N/A;  AF, FELICIANO, DCCV, MAC, GP, 3 PREP    TREATMENT OF CARDIAC ARRHYTHMIA N/A 2/3/2025    Procedure: Cardioversion or Defibrillation;  Surgeon: Bronson Bowden MD;  Location: [facility] EP LAB;  Service: Cardiology;  Laterality: N/A;  AF, FELICIANO, DCCV, MAC, GP, 3 Prep       Social History     Tobacco Use   Smoking Status Former    Current packs/day: 0.00    Average packs/day: 0.5 packs/day for 32.0 years (16.0 ttl pk-yrs)    Types: Cigarettes    Start date: 1984    Quit date: 2016    Years since quittin.5   Smokeless Tobacco Never       Social History     Substance and Sexual Activity   Alcohol Use Yes    Comment: Pt reports occasional beers on Sundays. Pt reports drinking daily prior to ESRD diagnosis.       Physical Activity: Inactive (6/3/2025)    Exercise Vital Sign     Days of Exercise per Week: 0 days     Minutes of Exercise per Session: 0 min       No birth history on file.  No results for input(s): "HCT" in the last 72 hours.  No results for input(s): "PLT" in the last 72 hours.  No results for input(s): "K" in the last 72 hours.  No results for input(s): "CREATININE" in the last 72 hours.  No results for input(s): "GLU" in the last 72 hours.  No results for input(s): "PT" in the last 72 hours.  There were no vitals filed for this visit.          Pertinent Cardiac Studies:  Transthoracic Echo Left Heart Catheterization   Results for orders placed during the hospital encounter of 25    Echo    Interpretation Summary    Left Ventricle: There is mild concentric hypertrophy. There is low normal systolic function with a visually estimated ejection fraction of 50 - 55%. Grade III diastolic dysfunction.    Left Ventricle: The left ventricle is at " the upper limits of normal in size. Mildly increased wall thickness. There is mild concentric hypertrophy. Regional wall motion abnormalities present. See diagram for wall motion findings. There is low normal systolic function with a visually estimated ejection fraction of 50 - 55%. Ejection fraction is approximately 53%. Grade III diastolic dysfunction.    Right Ventricle: Systolic function is borderline low despite the low TAPSE.    Left Atrium: Left atrium is moderately dilated.    Aortic Valve: The aortic valve is a trileaflet valve. There is mild aortic valve sclerosis. There is moderate annular calcification present. There is mild aortic regurgitation with a centrally directed jet.    Mitral Valve: There is mild regurgitation with a centrally directed jet.    Pericardium: There is a trivial posterior effusion and under the RA.    Tricuspid Valve: There is mild regurgitation.    Pulmonary Artery: The estimated pulmonary artery systolic pressure is 30 mmHg.   No results found for this or any previous visit.               Pre-op Assessment    I have reviewed the Patient Summary Reports.    I have reviewed the NPO Status.   I have reviewed the Medications.     Review of Systems  Anesthesia Hx:  No problems with previous Anesthesia               Denies Personal Hx of Anesthesia complications.                    Hematology/Oncology:  Hematology Normal   Oncology Normal                Hematology Comments: Last xarelto for afib last night                    Cardiovascular:     Hypertension   Denies MI.     Dysrhythmias atrial fibrillation  Denies Angina. CHF        PASP 70        Shortness of Breath       Congestive Heart Failure (CHF)                Hypertension     Atrial Fibrillation, Atrial Flutter     Pulmonary:  Pneumonia  Denies COPD.  Denies Asthma.  Shortness of breath              Pulmonary Infection:  Pneumonia.     Renal/:  Chronic Renal Disease, CKD   11/05/2016: S/P cadaveric kidney transplant  11/5/2016. ESRD secondary   to HTN/DMII     Kidney Function/Disease             Hepatic/GI:      Liver Disease, Hepatitis, C           Liver Disease, Hepatitis        Neurological:  Denies TIA. CVA, residual symptoms Neuromuscular Disease,   Denies Seizures.                   CVA - Cerebrovasular Accident               Neuromuscular Disease   Endocrine:  Diabetes, type 2, using insulin   Lantus 20 am, none this am, 5-10 prandial doses Diabetes                      Psych:  Psychiatric History                  Physical Exam  General: Well nourished, Cooperative, Alert and Oriented    Airway:  Mallampati: II   Mouth Opening: Normal  TM Distance: Normal  Tongue: Normal  Neck ROM: Normal ROM        Anesthesia Plan  Type of Anesthesia, risks & benefits discussed:    Anesthesia Type: Gen ETT  Intra-op Monitoring Plan: Standard ASA Monitors and Art Line  Post Op Pain Control Plan: IV/PO Opioids PRN  Induction:  IV  Airway Plan: Video and Direct, Post-Induction  Informed Consent: Informed consent signed with the Patient and all parties understand the risks and agree with anesthesia plan.  All questions answered.   ASA Score: 3  Day of Surgery Review of History & Physical: H&P Update referred to the surgeon/provider.    Ready For Surgery From Anesthesia Perspective.     .    Date/Time: 6/11/2024 7:28 AM     Performed by: Wero Atkins III, CRNA  Authorized by: Wero Atkins III, CRNA    Intubation:     Induction:  Inhalational - mask    Intubated:  Postinduction    Mask Ventilation:  Easy mask    Attempts:  1    Attempted By:  CRNA    Method of Intubation:  Video laryngoscopy    Blade:  Faye 4    Laryngeal View Grade: Grade IIA - cords partially seen      Difficult Airway Encountered?: No      Complications:  None    Airway Device:  Oral endotracheal tube    Airway Device Size:  7.5    Style/Cuff Inflation:  Cuffed (inflated to minimal occlusive pressure)    Tube secured:  23    Secured at:  The lips    Placement  Verified By:  Capnometry    Complicating Factors:  None    Findings Post-Intubation:  BS equal bilateral       Latest Reference Range & Units 01/27/25 10:05   Creatinine 0.5 - 1.4 mg/dL 2.2 (H)   (H): Data is abnormally high     Latest Reference Range & Units 06/25/24 14:43   BNP 0 - 99 pg/mL 992 (H)   (H): Data is abnormally high      Vent. Rate :  75 BPM     Atrial Rate : 300 BPM      P-R Int :    ms          QRS Dur : 108 ms       QT Int : 388 ms       P-R-T Axes :  94  74 197 degrees     QTcB Int : 433 ms     Atrial flutter   Incomplete left bundle branch block   LVH with secondary ST/T changes   Abnormal ECG   When compared with ECG of 07-Dec-2024 05:33,   Atrial flutter has replaced Atrial fibrillation   Confirmed by Chris Barron ([id number]) on 12/16/2024 3:00:12 PM           12/2024  Left Ventricle The left ventricle is normal in size. Ventricular mass is normal. Normal wall thickness. Normal wall motion. There is mildly reduced systolic function with a visually estimated ejection fraction of 45 - 50%. Grade III diastolic dysfunction.   Right Ventricle Normal right ventricular cavity size. Wall thickness is normal. Right ventricle wall motion  is normal. Systolic function is mildly reduced.   Left Atrium Left atrium is mildly dilated.   Right Atrium Normal right atrial size.   Aortic Valve The aortic valve is structurally normal. There is normal leaflet mobility. Aortic valve peak velocity is 1.1 m/s. Mean gradient is 2.6 mmHg. There is mild to moderate aortic regurgitation with a centrally directed jet.   Mitral Valve The mitral valve is structurally normal. There is normal leaflet mobility. There is moderate regurgitation with a centrally directed jet.   Tricuspid Valve The tricuspid valve is structurally normal. There is normal leaflet mobility. There is mild to moderate regurgitation with a centrally directed jet.   Pulmonic Valve The pulmonic valve is structurally normal. There is normal leaflet  mobility.   IVC/SVC Normal venous pressure at 3 mmHg.   Ascending Aorta Aortic root is normal in size measuring 3.57 cm. Ascending aorta is normal measuring 3.65 cm.   Pericardium and Other Findings There is no pericardial effusion. Left pleural effusion.   Pulmonary Artery There is severe pulmonary hypertension. The estimated pulmonary artery systolic pressure is 70 mmHg.

## 2025-06-12 NOTE — NURSING
Pt prepped in room 11 on SSCU. Pt is AAOx4 and follows commands appropriately. VS taken and wnl and pt is free from s/s of pain/distress. IVs started, mepilex applied to heals/bottom, 12 lead EKG completed, sites clipped and preop questions completed. Procedural consents not done at this time. ANES consent completed and verified. Safety measures in place. Pt's wife is at bedside. . ANES MD Renee notified that pt has continuous insulin being administered. MD okayed.

## 2025-06-12 NOTE — HPI
70 yo M with PMH of CKD 3-4, history of kidney transplant, chronic combined CHF (FELICIANO 2/3/25 EF 40-45%, TTE 12/8/24 G3DD), CVA right MCA 12/24 with minimal residual LLE and left hand weakness, DM2 A1c 8.6 on 01/27, atrial fib/flutter s/p DCCV 02/03/2025 here for redo ablation.      EP background   Patient with Hx of persistent AF, underwent DCCV and flecainide initiation in 8/2019. Over the past several months he has had recurrent symptomatic AF. He underwent DCCV in 8/2019, and was started on flecainide 100 mg bid post-procedure.  QRS wide on ECG--flecainide reduced to 50 mg bid. By 5/2023 QRS had narrowed, although he had an episode of syncope in 5/2023 thought to be due to hypotension 2/2 chlorthalidone. SPECT 5/15/2023 negative for ischemia or scar. Event monitor 1/2024--one episode of AF noted 12/24/2023, and episodes of SR and junctional rhythm with HR ranging from 54-74. S/p CTI/PVI/LA PWI with Dr. Bowden in June 2024 (PVI with LA posterior wall isolation). HV interval post ablation 53ms.  Multaq discontinue as pt remained NSR after the procedure. Only on coreg 25 BID. Had another FELICIANO/cardioversion 2/23/25 with plan to redo ablation outpatient.

## 2025-06-12 NOTE — TELEPHONE ENCOUNTER
----- Message from Bronson Bowden MD sent at 6/12/2025  7:12 AM CDT -----  yes  ----- Message -----  From: Yogesh Flores RN  Sent: 6/11/2025  11:29 AM CDT  To: Bronson Bowden MD    Patient scheduled for redo PVI/ CTI on 6/12/2025. Pre Op Lab on 6/7/25. Hgb 9.5 Hct 32.3 ; previous lab 5/1/2025-6/6/2025 Hgb 8.3-9.7  Hct 27- 35.7Patient hx of anemia/ CKD; takes weekly Retacrit injections; patient report no signs/symptoms of active bleeding.  patient recently admitted 6/3/25 for CHF exacerbation. Patient reports improvement of shortness of breath and ability to lie flat post procedure. Okay to proceed? Yogesh Rowland RN

## 2025-06-13 ENCOUNTER — CLINICAL SUPPORT (OUTPATIENT)
Dept: NEPHROLOGY | Facility: CLINIC | Age: 70
End: 2025-06-13
Payer: MEDICARE

## 2025-06-13 ENCOUNTER — LAB VISIT (OUTPATIENT)
Dept: LAB | Facility: HOSPITAL | Age: 70
End: 2025-06-13
Payer: MEDICARE

## 2025-06-13 ENCOUNTER — DOCUMENTATION ONLY (OUTPATIENT)
Dept: CARDIOLOGY | Facility: CLINIC | Age: 70
End: 2025-06-13

## 2025-06-13 ENCOUNTER — TELEPHONE (OUTPATIENT)
Dept: CARDIOLOGY | Facility: CLINIC | Age: 70
End: 2025-06-13

## 2025-06-13 ENCOUNTER — OFFICE VISIT (OUTPATIENT)
Dept: CARDIOLOGY | Facility: CLINIC | Age: 70
End: 2025-06-13
Payer: MEDICARE

## 2025-06-13 VITALS
BODY MASS INDEX: 27.11 KG/M2 | DIASTOLIC BLOOD PRESSURE: 62 MMHG | WEIGHT: 204.56 LBS | HEIGHT: 73 IN | OXYGEN SATURATION: 99 % | SYSTOLIC BLOOD PRESSURE: 139 MMHG | HEART RATE: 52 BPM

## 2025-06-13 VITALS
WEIGHT: 204.56 LBS | SYSTOLIC BLOOD PRESSURE: 132 MMHG | HEART RATE: 52 BPM | OXYGEN SATURATION: 97 % | DIASTOLIC BLOOD PRESSURE: 66 MMHG | BODY MASS INDEX: 26.99 KG/M2

## 2025-06-13 DIAGNOSIS — D63.8 ANEMIA OF CHRONIC DISEASE: Primary | ICD-10-CM

## 2025-06-13 DIAGNOSIS — I48.92 ATRIAL FLUTTER, UNSPECIFIED TYPE: ICD-10-CM

## 2025-06-13 DIAGNOSIS — Z94.0 S/P KIDNEY TRANSPLANT: ICD-10-CM

## 2025-06-13 DIAGNOSIS — N18.4 CHRONIC KIDNEY DISEASE (CKD), STAGE IV (SEVERE): ICD-10-CM

## 2025-06-13 DIAGNOSIS — I50.32 CHRONIC DIASTOLIC HEART FAILURE: Primary | ICD-10-CM

## 2025-06-13 DIAGNOSIS — R06.02 SOB (SHORTNESS OF BREATH): ICD-10-CM

## 2025-06-13 DIAGNOSIS — I48.0 PAROXYSMAL ATRIAL FIBRILLATION: ICD-10-CM

## 2025-06-13 LAB
ABSOLUTE EOSINOPHIL (OHS): 0.05 K/UL
ABSOLUTE MONOCYTE (OHS): 0.74 K/UL (ref 0.3–1)
ABSOLUTE NEUTROPHIL COUNT (OHS): 2.48 K/UL (ref 1.8–7.7)
ALBUMIN SERPL BCP-MCNC: 3.2 G/DL (ref 3.5–5.2)
ALP SERPL-CCNC: 50 UNIT/L (ref 40–150)
ALT SERPL W/O P-5'-P-CCNC: <5 UNIT/L (ref 10–44)
ANION GAP (OHS): 11 MMOL/L (ref 8–16)
AORTIC SIZE INDEX (SOV): 1.8 CM/M2
AORTIC SIZE INDEX: 1.8 CM/M2
ASCENDING AORTA: 3.7 CM
AST SERPL-CCNC: 16 UNIT/L (ref 11–45)
BASOPHILS # BLD AUTO: 0.04 K/UL
BASOPHILS NFR BLD AUTO: 0.9 %
BILIRUB SERPL-MCNC: 0.3 MG/DL (ref 0.1–1)
BNP SERPL-MCNC: 3050 PG/ML (ref 0–99)
BSA FOR ECHO PROCEDURE: 2.08 M2
BUN SERPL-MCNC: 78 MG/DL (ref 8–23)
CALCIUM SERPL-MCNC: 8.5 MG/DL (ref 8.7–10.5)
CHLORIDE SERPL-SCNC: 103 MMOL/L (ref 95–110)
CO2 SERPL-SCNC: 25 MMOL/L (ref 23–29)
CREAT SERPL-MCNC: 3.8 MG/DL (ref 0.5–1.4)
ERYTHROCYTE [DISTWIDTH] IN BLOOD BY AUTOMATED COUNT: 18.8 % (ref 11.5–14.5)
GFR SERPLBLD CREATININE-BSD FMLA CKD-EPI: 17 ML/MIN/1.73/M2
GLUCOSE SERPL-MCNC: 127 MG/DL (ref 70–110)
HCT VFR BLD AUTO: 29.8 % (ref 40–54)
HGB BLD-MCNC: 8.6 GM/DL (ref 14–18)
IMM GRANULOCYTES # BLD AUTO: 0.02 K/UL (ref 0–0.04)
IMM GRANULOCYTES NFR BLD AUTO: 0.4 % (ref 0–0.5)
LYMPHOCYTES # BLD AUTO: 1.32 K/UL (ref 1–4.8)
MAGNESIUM SERPL-MCNC: 2.1 MG/DL (ref 1.6–2.6)
MCH RBC QN AUTO: 22 PG (ref 27–31)
MCHC RBC AUTO-ENTMCNC: 28.9 G/DL (ref 32–36)
MCV RBC AUTO: 76 FL (ref 82–98)
NUCLEATED RBC (/100WBC) (OHS): 0 /100 WBC
PHOSPHATE SERPL-MCNC: 4.9 MG/DL (ref 2.7–4.5)
PLATELET # BLD AUTO: 148 K/UL (ref 150–450)
PMV BLD AUTO: ABNORMAL FL
POTASSIUM SERPL-SCNC: 4.1 MMOL/L (ref 3.5–5.1)
PROT SERPL-MCNC: 6 GM/DL (ref 6–8.4)
RBC # BLD AUTO: 3.91 M/UL (ref 4.6–6.2)
RELATIVE EOSINOPHIL (OHS): 1.1 %
RELATIVE LYMPHOCYTE (OHS): 28.4 % (ref 18–48)
RELATIVE MONOCYTE (OHS): 15.9 % (ref 4–15)
RELATIVE NEUTROPHIL (OHS): 53.3 % (ref 38–73)
SINUS: 3.7 CM
SODIUM SERPL-SCNC: 139 MMOL/L (ref 136–145)
STJ: 3 CM
WBC # BLD AUTO: 4.65 K/UL (ref 3.9–12.7)

## 2025-06-13 PROCEDURE — 84100 ASSAY OF PHOSPHORUS: CPT | Mod: HCNC

## 2025-06-13 PROCEDURE — 36415 COLL VENOUS BLD VENIPUNCTURE: CPT | Mod: HCNC

## 2025-06-13 PROCEDURE — 99999 PR PBB SHADOW E&M-EST. PATIENT-LVL V: CPT | Mod: PBBFAC,HCNC,,

## 2025-06-13 PROCEDURE — 99999 PR PBB SHADOW E&M-EST. PATIENT-LVL II: CPT | Mod: PBBFAC,HCNC,,

## 2025-06-13 PROCEDURE — 82040 ASSAY OF SERUM ALBUMIN: CPT | Mod: HCNC

## 2025-06-13 PROCEDURE — 83880 ASSAY OF NATRIURETIC PEPTIDE: CPT | Mod: HCNC

## 2025-06-13 PROCEDURE — 85025 COMPLETE CBC W/AUTO DIFF WBC: CPT | Mod: HCNC

## 2025-06-13 PROCEDURE — 83735 ASSAY OF MAGNESIUM: CPT | Mod: HCNC

## 2025-06-13 NOTE — PROGRESS NOTES
HF TCC Provider Note (Follow-up) Consult Note      HPI:     Patient reports improvement in SOB from recent admission, though has not been very active since discharge. Denies SOB ambulating within home. Presents to clinic in wheelchair               Patient denies orthopnea symptoms. Falls asleep watching TV with 3 pillows but sleeps on 1              Patient wakes up SOB, has to get out of bed, associated cough- denies              Palpitations - denies              Dizzy, light-headed, pre-syncope or syncope- reports episode of vertigo last night lasting couple mins in duration after turning head quickly with laying down. Denies further episodes of dizziness/lightheadedness or pre-syncope/syncope              Since discharge frequency of performing weights, home weight and weight change- performing home weights, reports home weight stable ~190lbs.              Other information felt pertinent to HPI: Mr. Ravi Zamorano is a 67 yo male with a PMHx of AF, HFpEF, bicuspid AV, HTN, HLD, PAD, IDDM, ESRD s/p renal txp 2016, CKD 4 who presents to [facility] enrollment following recent admission for ADHF. Presented to ED with worsening dyspnea, orthopnea, and LE edema. He was hypertensive (SBP >200), with pulmonary edema on CXR and BNP ~4000. Renal function remained at baseline (Cr 3.1). He was managed with IV diuretics for volume control, which led to marked symptomatic improvement. However, persistent hypertension required rebuilding his antihypertensive regimen in collaboration with Nephrology and Cardiology. Underwent FELICIANO with re-do PVI/AFL RFA yesterday per EP. Today reports sore throat s/p FELICIANO with blood tinged sputum this morning after coughing. Denies large volume hemoptysis.        PHYSICAL:   Vitals:    06/13/25 0934   BP: 139/62   Pulse: (!) 52      @JPHC2MDWHMBI(3)@    JVD: no   Heart rhythm: regular  Cardiac murmur: No    S3: no  S4: no  Lungs: clear  Hepatojugular reflux: no  Edema: trace edema in  ankles    Lab Results   Component Value Date     06/13/2025     (L) 03/19/2025    K 4.1 06/13/2025    K 3.6 03/19/2025    MG 2.1 06/13/2025     06/13/2025     03/19/2025    CO2 25 06/13/2025    CO2 19 (L) 03/19/2025    BUN 78 (H) 06/13/2025    CREATININE 3.8 (H) 06/13/2025     (H) 06/13/2025     (H) 03/19/2025    CALCIUM 8.5 (L) 06/13/2025    CALCIUM 8.0 (L) 03/19/2025    AST 16 06/13/2025    AST 11 03/19/2025    ALT <5 (L) 06/13/2025    ALT <5 (L) 03/19/2025    ALBUMIN 3.2 (L) 06/13/2025    ALBUMIN 2.5 (L) 03/19/2025    PROT 6.0 06/13/2025    PROT 5.6 (L) 03/19/2025    BILITOT 0.3 06/13/2025    BILITOT 0.4 03/19/2025     Lab Results   Component Value Date    BNP 3,050 (H) 06/13/2025    BNP 4,062 (H) 06/03/2025    BNP 1,534 (H) 05/22/2025       Echo 6/4/25:    Left Ventricle: The left ventricle is normal in size. Ventricular mass is normal. Normal wall thickness. Normal wall motion. There is normal systolic function with a visually estimated ejection fraction of 55 - 60%. Ejection fraction is approximately 60%.    Right Ventricle: Systolic function is borderline low.    Left Atrium: The left atrium is severely dilated    Right Atrium: The right atrium is mildly dilated .    Aortic Valve: There is mild annular calcification present. There is mild aortic regurgitation.    Tricuspid Valve: There is mild regurgitation.    Pulmonary Artery: The estimated pulmonary artery systolic pressure is 36 mmHg.    IVC/SVC: Elevated venous pressure at 15 mmHg.    ASSESSMENT/PLAN:    1. Chronic diastolic heart failure  -NYHA Class II symptoms. Appears well compensated on exam today from fluid standpoint. Diuretic regimen increased on hospital discharge. Currently on torsemide 60mg daily.   -Given worsening renal function on labs without significant fluid volume on exam, recommend reducing torsemide to 40mg once daily with repeat labs next week to continue to trend.  -Recommend 2-3 gram sodium  restriction and 1500cc- 2000cc fluid restriction.  -Requested patient to weigh themselves daily, and to notify us if their weight increases by more than 3 lbs in 1 day or 5 lbs in 3 days.  -Repeat labs next week with RTC in 2 weeks or sooner prn. Entresto initiated during recent admission, Pro-BNP ordered for better accuracy.    2. Atrial flutter, unspecified type   -s/p FELICIANO with re-do PVI/AFL RFA yesterday per EP.   -Continue lopressor and xarelto.  -Blood tinged sputum 2/2 recent FELICIANO, instructed to notify EP if persistent. ED precautions for large volume hemoptysis      3. Paroxysmal atrial fibrillation   -s/p FELICIANO with re-do PVI/AFL RFA yesterday per EP.   -Continue lopressor and xarelto.  -Blood tinged sputum 2/2 recent FELICIANO, instructed to notify EP if persistent. ED precautions for large volume hemoptysis    4. S/P cadaveric kidney transplant 11/5/2016. ESRD secondary to HTN/DMII  -Worsening renal function over the last 6 months on chart review. Labs routed to Kidney Txp team for review    5. CKD IV (severe)   -Cr 3.8 today, diuretic adjustments as above   -Will message Nephrology for close follow up     Paris Lujan PA-C

## 2025-06-13 NOTE — PATIENT INSTRUCTIONS
Will call today with lab results and coordinate follow up.    Notify us ([phone]) with any worsening shortness of breath, swelling, or 3lb weight gain in 1 day/5lb weight gain in 1 week.

## 2025-06-13 NOTE — PROGRESS NOTES
"Heart Failure Transitional Care Clinic([facility]FT[facility]) First Week Visit     Pt presents to clinic 6/13/2025 and accompanied by his wife Mrs. Erika Zamorano.     Most Recent Hospital Discharge Date: 6/7/2025  Last admission Diagnosis/chief complaint: SOB        Visit Vitals:     Wt Readings from Last 3 Encounters:   06/13/25 92.8 kg (204 lb 9.4 oz)   06/13/25 92.8 kg (204 lb 9.4 oz)   06/12/25 84.4 kg (186 lb)     Temp Readings from Last 3 Encounters:   06/12/25 97.7 °F (36.5 °C) (Temporal)   06/07/25 98.1 °F (36.7 °C) (Oral)   05/25/25 97.7 °F (36.5 °C) (Oral)     BP Readings from Last 3 Encounters:   06/13/25 132/66   06/13/25 139/62   06/12/25 (!) 177/84     Pulse Readings from Last 3 Encounters:   06/13/25 (!) 52   06/13/25 (!) 52   06/12/25 (!) 54            Pt reports the following:  []  Shortness of Breath with activity  []  Shortness of Breath at rest/ sleeping on 2-3 pillows "some days"  []  Fatigue  [x]  Edema bilateral trace amount in ankles.  [] Chest pain or tightness  [] Weight Increase since discharge  [] None of the above    Pt reports being in the Green (color) Zone. If in yellow/red, reminded that they should be calling TC today or now.      Medications:     Pt reports having all medications available and understands how to take them appropriately. Reminded pt to call prior to making any changes to medications.      Education:    [x] Gave pt new  / Confirmed pt has  "Heart Failure Transitional Care Clinic Home Care Guide" .   Reviewed key points as listed below.      Recommend 2 gram sodium restriction and 1500cc fluid restriction.  Encourage physical activity with graded exercise program.  Requested patient to weigh themselves daily, and to notify us if their weight increases by more than 3 lbs in 1 day or 5 lbs in 3 days.      [x] Gave / Reviewed "Daily Weight and Symptom Tracker".  Reviewed with patient [facility] and [facility] according to "Yellow Zone" and "Red Zone".                  [x] Pt " "given list of low/high sodium foods and Your Heart-Healthy Nutrition booklet.    Pt was given Connecting with Your [facility] pamphlet to ensure success.                   Watch for these Signs and Symptoms: If any of these occur, contact HFTCC immediately:   Increase in shortness of breath with movement   Increase in swelling in your legs and ankles   Weight gain of more than 3 pounds in a night or 5 pounds in 3 days.   Difficulty breathing when you are lying down   Worsening fatigue or tiredness   Stomach bloating, a full feeling or a loss of appetite   Increased coughing--especially when you are lying down     MyChart and Care Companion:              Patient active on myChart? Yes, patient uses regularly.    Contacting our Team:              Reviewed with pt how to contact HFTCC Nurse via phone or [employer] messaging.      HF TCC Program Plan:  Pt educated on follow-up plan while in HFTCC program.   [x]  PT given /reviewed upcoming appointment/check in dates. These will include weekly contact with Nurse or visits with providers over the next 4-6 weeks.                   [x]  Pt educated that they will transitioned to long term care provider team at week 4-6.  This team will be either Cardiology, PCP or Advanced Heart Failure depending on needs.          Pt was able to verbalize back to LPN in their own words correct diet/fluid restrictions, necessity for exercise, warning signs and symptoms, when and how to contact their TCC team .       Plan:     See PA-C note.       [x]  Pt given AVS with follow up appointment within two weeks and medication detail list for ease of use. 6/27/2025 for 10:00a with labs for 9:30a.    [x]  Explained to pt they will have a phone "check in" by Nurse in one week on 6/20/2025.          Will follow up with pt at next clinic visit and Nurse navigator available for pt questions, issues or concerns.     Please refer to provider visit for additional details and assessment.    "

## 2025-06-13 NOTE — TELEPHONE ENCOUNTER
Heart Failure Transitional Care Clinic    11:22a - Spoke with . Ravi Zamorano to inform him of the following.     Can we call and let him know kidney function today on labs lower than his baseline (Cr 3.8). I'm going reach out to his kidney transplant team to coordinate possible adjustments.     Your kidney function is lower than your baseline. Ms. Lujan will reach out to your kidney transplant team to coordinate the possible adjustments. Once we hear from them we will call with your next steps. The pt verbalized understanding.

## 2025-06-16 NOTE — TELEPHONE ENCOUNTER
Please provide printed Rx.  Thanks Elizabeth   MEDICATIONS  (STANDING):  heparin   Injectable 5000 Unit(s) SubCutaneous every 12 hours  hydrALAZINE 25 milliGRAM(s) Oral three times a day  levothyroxine 75 MICROGram(s) Oral daily  mirtazapine 7.5 milliGRAM(s) Oral at bedtime  risperiDONE   Tablet 0.25 milliGRAM(s) Oral <User Schedule>    MEDICATIONS  (PRN):  melatonin 3 milliGRAM(s) Oral at bedtime PRN Insomnia

## 2025-06-17 ENCOUNTER — LAB VISIT (OUTPATIENT)
Dept: LAB | Facility: HOSPITAL | Age: 70
End: 2025-06-17
Payer: MEDICARE

## 2025-06-17 ENCOUNTER — CLINICAL SUPPORT (OUTPATIENT)
Dept: DIABETES | Facility: CLINIC | Age: 70
End: 2025-06-17
Payer: MEDICARE

## 2025-06-17 DIAGNOSIS — E11.22 TYPE 2 DIABETES MELLITUS WITH STAGE 3A CHRONIC KIDNEY DISEASE, WITH LONG-TERM CURRENT USE OF INSULIN: Primary | ICD-10-CM

## 2025-06-17 DIAGNOSIS — N18.31 TYPE 2 DIABETES MELLITUS WITH STAGE 3A CHRONIC KIDNEY DISEASE, WITH LONG-TERM CURRENT USE OF INSULIN: Primary | ICD-10-CM

## 2025-06-17 DIAGNOSIS — Z79.4 TYPE 2 DIABETES MELLITUS WITH STAGE 3A CHRONIC KIDNEY DISEASE, WITH LONG-TERM CURRENT USE OF INSULIN: Primary | ICD-10-CM

## 2025-06-17 DIAGNOSIS — R06.02 SOB (SHORTNESS OF BREATH): ICD-10-CM

## 2025-06-17 DIAGNOSIS — I50.32 CHRONIC DIASTOLIC HEART FAILURE: ICD-10-CM

## 2025-06-17 LAB
ABSOLUTE EOSINOPHIL (OHS): 0.11 K/UL
ABSOLUTE MONOCYTE (OHS): 0.96 K/UL (ref 0.3–1)
ABSOLUTE NEUTROPHIL COUNT (OHS): 1.98 K/UL (ref 1.8–7.7)
ALBUMIN SERPL BCP-MCNC: 3.3 G/DL (ref 3.5–5.2)
ALP SERPL-CCNC: 45 UNIT/L (ref 40–150)
ALT SERPL W/O P-5'-P-CCNC: <5 UNIT/L (ref 10–44)
ANION GAP (OHS): 10 MMOL/L (ref 8–16)
AST SERPL-CCNC: 15 UNIT/L (ref 11–45)
BASOPHILS # BLD AUTO: 0.03 K/UL
BASOPHILS NFR BLD AUTO: 0.6 %
BILIRUB SERPL-MCNC: 0.4 MG/DL (ref 0.1–1)
BNP SERPL-MCNC: 4531 PG/ML (ref 0–99)
BUN SERPL-MCNC: 71 MG/DL (ref 8–23)
CALCIUM SERPL-MCNC: 9.1 MG/DL (ref 8.7–10.5)
CHLORIDE SERPL-SCNC: 102 MMOL/L (ref 95–110)
CO2 SERPL-SCNC: 28 MMOL/L (ref 23–29)
CREAT SERPL-MCNC: 3.5 MG/DL (ref 0.5–1.4)
ERYTHROCYTE [DISTWIDTH] IN BLOOD BY AUTOMATED COUNT: 18.7 % (ref 11.5–14.5)
GFR SERPLBLD CREATININE-BSD FMLA CKD-EPI: 18 ML/MIN/1.73/M2
GLUCOSE SERPL-MCNC: 119 MG/DL (ref 70–110)
HCT VFR BLD AUTO: 32.6 % (ref 40–54)
HGB BLD-MCNC: 9.3 GM/DL (ref 14–18)
IMM GRANULOCYTES # BLD AUTO: 0.02 K/UL (ref 0–0.04)
IMM GRANULOCYTES NFR BLD AUTO: 0.4 % (ref 0–0.5)
LYMPHOCYTES # BLD AUTO: 1.81 K/UL (ref 1–4.8)
MAGNESIUM SERPL-MCNC: 2 MG/DL (ref 1.6–2.6)
MCH RBC QN AUTO: 21.7 PG (ref 27–31)
MCHC RBC AUTO-ENTMCNC: 28.5 G/DL (ref 32–36)
MCV RBC AUTO: 76 FL (ref 82–98)
NUCLEATED RBC (/100WBC) (OHS): 0 /100 WBC
PHOSPHATE SERPL-MCNC: 3.5 MG/DL (ref 2.7–4.5)
PLATELET # BLD AUTO: 151 K/UL (ref 150–450)
PMV BLD AUTO: ABNORMAL FL
POTASSIUM SERPL-SCNC: 4.4 MMOL/L (ref 3.5–5.1)
PROT SERPL-MCNC: 6.4 GM/DL (ref 6–8.4)
RBC # BLD AUTO: 4.29 M/UL (ref 4.6–6.2)
RELATIVE EOSINOPHIL (OHS): 2.2 %
RELATIVE LYMPHOCYTE (OHS): 36.9 % (ref 18–48)
RELATIVE MONOCYTE (OHS): 19.6 % (ref 4–15)
RELATIVE NEUTROPHIL (OHS): 40.3 % (ref 38–73)
SODIUM SERPL-SCNC: 140 MMOL/L (ref 136–145)
WBC # BLD AUTO: 4.91 K/UL (ref 3.9–12.7)

## 2025-06-17 PROCEDURE — G0108 DIAB MANAGE TRN  PER INDIV: HCPCS | Mod: HCNC,S$GLB,,

## 2025-06-17 PROCEDURE — 36415 COLL VENOUS BLD VENIPUNCTURE: CPT | Mod: HCNC

## 2025-06-17 PROCEDURE — 82040 ASSAY OF SERUM ALBUMIN: CPT | Mod: HCNC

## 2025-06-17 PROCEDURE — 83880 ASSAY OF NATRIURETIC PEPTIDE: CPT | Mod: HCNC

## 2025-06-17 PROCEDURE — 83880 ASSAY OF NATRIURETIC PEPTIDE: CPT | Mod: 59,HCNC

## 2025-06-17 PROCEDURE — 85025 COMPLETE CBC W/AUTO DIFF WBC: CPT | Mod: HCNC

## 2025-06-17 PROCEDURE — 84100 ASSAY OF PHOSPHORUS: CPT | Mod: HCNC

## 2025-06-17 PROCEDURE — 83735 ASSAY OF MAGNESIUM: CPT | Mod: HCNC

## 2025-06-18 ENCOUNTER — DOCUMENTATION ONLY (OUTPATIENT)
Dept: CARDIOLOGY | Facility: CLINIC | Age: 70
End: 2025-06-18
Payer: MEDICARE

## 2025-06-18 RX ORDER — TORSEMIDE 20 MG/1
40 TABLET ORAL DAILY
Qty: 60 TABLET | Refills: 11 | Status: SHIPPED | OUTPATIENT
Start: 2025-06-18 | End: 2026-06-18

## 2025-06-18 NOTE — PROGRESS NOTES
"Heart Failure Transitional Care Clinic(HFT[facility]) weekly phone follow up / triage call completed.     [facility] LPN Navigator spoke with Mr. Ravi Zamorano.    Current Patient reported weight: 190lbs   Patient Goal Weight: (~190lbs)  Recent Patient reported blood pressure and heart rate: BP-136/63 and HR-56    Called pt to review his labs as follows.     Repeat labs reviewed. Cr improving (3.8->3.5), but still higher than prior baseline. Recommend continuing reduced torsemide of 40mg once daily. NT Pro BNP still pending.    Your creatinine has improved but it is not at their baseline yet. It went from 3.8 to 3.5. Ms. Lujan recommend you continue the reduced torsemide of 40mg once a day. We are still waiting for your fluid markers to result. The labs for this does take a little longer to result.      Pt reports the following:  []  Shortness of Breath with Activity  []  Shortness of Breath at rest   []  Fatigue  []  Edema   [] Chest pain or tightness  [] Weight Increase since discharge  [x] None of the above    Pt reports using "Daily weight and symptom tracker".    Pt reports being in the Green (color) Zone. If in yellow/red, reminded that they should be calling HFTCC today or now.     Medications:   Medication compliance reviewed with pt.  Pt reports having medication list available and has all medications at home for use per list.     Education:   Confirmed pt still has "Heart Failure Transitional Care Clinic Home Care Guide"  .     Reminded of key points as listed below.     Recommend 2 -3 gram sodium restriction and 1500 cc-2000 cc fluid restriction.  Encourage physical activity with graded exercise program.  Requested patient to weigh themselves daily, and to notify us if their weight increases by more than 3 lbs in 1 day or 5 lbs in 3 days.   Reminded to use "Daily weight and symptom tracker".  Even if pt does not have a scale, to use symptom tracker.       Watch for these Signs and Symptoms: If any of these occur, " contact HFT[facility] immediately:   Increase in shortness of breath with movement   Increase in swelling in your legs and ankles   Weight gain of more than 3 pounds in a day or 5 pounds in 3 days.   Difficulty breathing when you are lying down   Worsening fatigue or tiredness   Stomach bloating, a full feeling or a loss of appetite   Increased coughing--especially when you are lying down      Pt was able to verbalize back to LPN in their own words correct diet/fluid restrictions, necessity for exercise, warning signs and symptoms, when and how to contact their HFTCC team.      Pt reminded of upcoming appointment.  PT reports they will attend. 6/27/2025 for 10:00a with labs for 9:30a.      Pt reminded of how and when to contact [facility]:  [phone](Mon-Fri, 8a-5p) & for urgent issues on the weekend to page the Heart Transplant MD on call.  Pt also encouraged utilize [facility] messaging as well.      Pt verbalized understanding and in agreement of plan.       Will follow up with pt at next clinic visit and Nurse navigator available for pt questions, issues or concerns.

## 2025-06-19 ENCOUNTER — LAB VISIT (OUTPATIENT)
Dept: LAB | Facility: HOSPITAL | Age: 70
End: 2025-06-19
Attending: STUDENT IN AN ORGANIZED HEALTH CARE EDUCATION/TRAINING PROGRAM
Payer: MEDICARE

## 2025-06-19 DIAGNOSIS — D63.8 ANEMIA OF CHRONIC DISEASE: ICD-10-CM

## 2025-06-19 LAB
HCT VFR BLD AUTO: 29.5 % (ref 40–54)
HGB BLD-MCNC: 8.5 GM/DL (ref 14–18)
IRON SATN MFR SERPL: 11 % (ref 20–50)
IRON SERPL-MCNC: 31 UG/DL (ref 45–160)
NT-PROBNP SERPL IA-MCNC: ABNORMAL PG/ML
TIBC SERPL-MCNC: 283 UG/DL (ref 250–450)
TRANSFERRIN SERPL-MCNC: 191 MG/DL (ref 200–375)

## 2025-06-19 PROCEDURE — 85018 HEMOGLOBIN: CPT | Mod: HCNC

## 2025-06-19 PROCEDURE — 36415 COLL VENOUS BLD VENIPUNCTURE: CPT | Mod: HCNC,PN

## 2025-06-19 PROCEDURE — 84466 ASSAY OF TRANSFERRIN: CPT | Mod: HCNC

## 2025-06-19 PROCEDURE — 85014 HEMATOCRIT: CPT | Mod: HCNC

## 2025-06-19 RX ORDER — INSULIN ASPART 100 [IU]/ML
INJECTION, SOLUTION INTRAVENOUS; SUBCUTANEOUS
COMMUNITY

## 2025-06-19 RX ORDER — ATORVASTATIN CALCIUM 80 MG/1
80 TABLET, FILM COATED ORAL
COMMUNITY

## 2025-06-19 RX ORDER — MELATONIN 3 MG
CAPSULE ORAL
COMMUNITY

## 2025-06-19 RX ORDER — DOXAZOSIN 8 MG/1
8 TABLET ORAL
COMMUNITY

## 2025-06-19 RX ORDER — ASPIRIN 81 MG/1
1 TABLET ORAL
COMMUNITY

## 2025-06-19 NOTE — PROGRESS NOTES
Hgb 9.3/ Hct 32.6     Retacrit 4000 units .Per Form # [id number]. Pt was educated on mechanism of action of medication and possible side effects. Handout was given also.All concerns and questions was addressed. Pt was ask to wait 15 min after administration ,tolerated well, no reactions noted. Pt was given 2 week appt.      GFR 18

## 2025-06-19 NOTE — PROGRESS NOTES
Diabetes Care Specialist Progress Note  Author: Argelia Bridges RD  Date: 6/19/2025    Intake    Program Intake  Reason for Diabetes Program Visit:: Intervention  Type of Intervention:: Individual  Individual: Education  Education: Self-Management Skill Review, Pattern Management  Current diabetes risk level:: moderate  In the last month, have you used the ER or been admitted to the hospital: Yes  Was the ER or hospital admission related to diabetes?: Yes  Permission to speak with others about care:: yes (spouse present at visit)    Current Diabetes Treatment: Insulin  Method of insulin delivery?: Injections, Insulin Pump  Type of Pump: Omnipod 5  Any problems obtaining supplies?: No    Continuous Glucose Monitoring  Patient has CGM: Yes  Personal CGM type:: Dexcom G7    Lab Results   Component Value Date    HGBA1C 6.4 (H) 06/03/2025       There is no height or weight on file to calculate BMI.    Lifestyle Coping Support & Clinical    Lifestyle/Coping/Support  Learning Barriers:: None  Psychosocial/Coping Skills Assessment Completed: : No  Deffered due to:: Time  Area of need?: Deferred    Problem Review  Active Comorbidities: Neuropathy, Hypertension, Hyperlipidemia/Dyslipidemia, Cardiovascular Disease, Chronic Kidney Disease    Diabetes Self-Management Skills Assessment    Medication Skills Assessment  Patient is able to identify current diabetes medications, dosages, and appropriate timing of medications.: yes  Medication Skills Assessment Completed:: Yes  Assessment indicates:: Instruction Needed  Area of need?: Yes    Diabetes Disease Process/Treatment Options  Diabetes Disease Process/Treatment Options: Skills Assessment Completed: No  Deferred due to:: Time  Area of need?: Deferred    Nutrition/Healthy Eating  Meal Plan 24 Hour Recall - Breakfast: 2 biscuits with butter and SF syrup  Meal Plan 24 Hour Recall - Lunch: sometimes skips  Meal Plan 24 Hour Recall - Dinner: 1/2 chicken sandwich, onion rings  Meal  Plan 24 Hour Recall - Snack: minimal, sometimes oreo thins  Assessment indicates:: Knowledge deficit, Instruction Needed  Area of need?: Yes    Physical Activity/Exercise  Deffered due to:: Time  Area of need?: Deferred    Home Blood Glucose Monitoring  Patient states that blood sugar is checked at home daily.: yes  Monitoring Method:: personal continuous glucose monitor  Personal CGM type:: Dexcom G7   What is your current Time in Range?: 72%  Home Blood Glucose Monitoring Skills Assessment Completed: : Yes  Assessment indicates:: Adequate understanding  Area of need?: No    Acute Complications  Deffered due to:: Time  Area of need?: Deferred    Chronic Complications  Reviewed health maintenance: yes  Deferred due to:: Time  Area of need?: Deferred    Assessment Summary and Plan    Based on today's diabetes care assessment, the following areas of need were identified:      Identified Areas of Need      Medication/Current Diabetes Treatment: Yes, see care plan.   Lifestyle Coping/Support: Deferred   Diabetes Disease Process/Treatment Options: Deferred   Nutrition/Healthy Eating: Yes , see care plan.   Physical Activity/Exercise: Deferred    Home Blood Glucose Monitoring: No    Acute Complications: Deferred    Chronic Complications: Deferred     Today's interventions were provided through individual discussion, instruction, and written materials were provided.      Patient verbalized understanding of instruction and written materials.  Pt was able to return back demonstration of instructions today. Patient understood key points, needs reinforcement and further instruction.     Diabetes Self-Management Care Plan:    Today's Diabetes Self-Management Care Plan was developed with Ravi's input. Ravi has agreed to work toward the following goal(s) to improve his/her overall diabetes control.      Care Plan: Diabetes Management   Updates made since 6/19/2024 12:00 AM        Problem: Medications         Goal: Pt agrees  to review insulin pumps discussed during appt to determine which insulin pump suits pt heri best. Completed 4/10/2025   Start Date: 2/10/2025   Expected End Date: 3/10/2025   This Visit's Progress: Met   Recent Progress: Deferred   Priority: High   Barriers: Knowledge deficit   Note:    Patient is interested in insulin pump therapy.     Insulin Pump Evaluation Check List:      Introduction to Insulin Pump Therapy:  Reviewed basics of insulin pump therapy    Encouraged patient to check with their insurance provider regarding information such as possible costs associated with initial and monthly pump costs.      Carbohydrate Counting Skills Evaluation:   Reviewed basic Carbohydrate Counting principles--Food groups, label reading, use of dry measuring cups for accurate portion sizes.      Insulin Pump Options:   Reviewed major insulin pump types:   Medtronic  Tandem: T-slim X 2, Mobi  Insulet:  Omni Pod 5  iLet pump  Discussed unique features of each pump.   Patient was able to view and practice actual device use--using demo pumps and virtual pumps  Discussed the integration of CGM into the pumps to provide automatic adjustments based on the CGM data         Problem: Healthy Eating         Goal: Pt agrees to look at food labels for total grams of carbohydrates and serving size to prepare for carb counting with insulin pump.    Start Date: 2/10/2025   Expected End Date: 5/10/2025   This Visit's Progress: Deferred   Priority: Medium   Barriers: Knowledge deficit   Note:    Reviewed basic Carbohydrate Counting principles--Food groups, label reading, use of dry measuring cups for accurate portion sizes       Task: Reviewed the sources and role of Carbohydrate, Protein, and Fat and how each nutrient impacts blood sugar. Completed 2/10/2025        Task: Explained how to count carbohydrates using the food label and the use of dry measuring cups for accurate carb counting. Completed 2/10/2025        Task: Review the  importance of balancing carbohydrates with each meal using portion control techniques to count servings of carbohydrate and label reading to identify serving size and amount of total carbs per serving. Completed 2/10/2025        Problem: Medications         Goal: Patient Agrees to use Omnipod 5 as prescribed.    Start Date: 4/10/2025   Expected End Date: 9/19/2025   This Visit's Progress: On track   Recent Progress: On track   Priority: High   Barriers: No Barriers Identified   Note:    OMNIPOD 5 INSULIN PUMP START Patient is here today for insulin pump training and will be starting on an Omni Pod 5 Insulin pump.     Patient demonstrated the ability to fill pod reservoir with 200 units, prime infusion set and insert pod per sterile technique.  Instructed pt on use of basic pump features. Reviewed site selection of pods, rotation of sites and hard stop on controller to change every 72 hrs.  Reviewed treatment of hypoglycemia, hyperglycemia.  Reviewed features available in manual mode verses automated mode.   Educated pt on how to switch from automated mode to manual mode.  Patient demonstrated ability to program Dexcom transmitter into controller and start automated limited mode.  Pt Dexcom connected to controller during appt.     INITIAL SETTINGS :     Bolus Menu:  Pump Settings per NP note  0.6 u/hr mn-0600 , 9p-mn, 0.7 u/hr 6a-9p   Target 120-130 mg/dl   Iob 3 hours  Max bolus 20 units   Max basal 2 u/hr   Isf 40 mn-mn  Icr 1 to 7.5     Presets:  Snack: 4 units (28g)  Small Meal: 8 units (56g)  Medium Meal: 12 units (90g)  Large Meal: 15 units (114g)  Super Large Meal: 18 units (130g)     Patient was given time to practice on simulator entering carbs and glucose into pump     [employer] account information added in specialty comments.    4/16/25: Educator encouraged pt to use higher preset if pt consuming higher carbohydrate meal.    6/17/25: Per Glooko Report, pt TIR:88%. Educator sent Glooko Report to NP. NP  "recommended:"Change to 45 isf and 8 for icr ".       Problem: Blood Glucose Self-Monitoring         Goal: Patient agrees to check and record blood sugars using Dexcom G7. Completed 6/17/2025   Start Date: 4/10/2025   Expected End Date: 5/15/2025   This Visit's Progress: Met   Recent Progress: On track   Priority: Medium   Barriers: No Barriers Identified   Note:    DEXCOM G7 CGM TRAINING     Patient referred to clinic today for Dexcom G7 continuous glucose sensor system training.  Dexcom G7 mobile marion was downloaded to phone.     Pt will be using his phone as the primary .  Overview:  5min glucose reading updates, trending arrows, BG graph screens, battery life indicator, Blue Tooth Symbol.  Menus: trend Graph, start sensor, enter BG, events, Alerts, Settings, Shutdown, Stop Sensor   Settings:                          * Urgent Low: 55 mg/dL                          * Urgent Low Soon: on                          * Low alert: 70 mg/dl                           * High alert: 250 mg/dl                          * Rise rate: off                          * Fall Rate: off                          * Signal Loss: on repeat 20 min                          * No Reading: on repeat 20 min                          * Always sound: on                          * Alert Schedule:  (instructed on how to set up alert schedule if desired)     Reviewed additional setting options with patient, including Graph Height.  Discussed no need to calibrate sensor during the entirety of the 10 day wear.  Reviewed sensor site selection. Site selected and prepped using aseptic technique.  Patient able to demonstrate without difficulty.  Encouraged to review manual prior to starting another sensor.   Dexcom technical support contact number given and examples of when to contact them discussed.    Advised to remove for CT, MRI, xrays  Advised sensor can get wet for bathing etc.  Should readings do not match symptoms to double check with " glucometer.     4/16/25: Pt states using Dexcom G7 as recommended.       Patriciao Report uploaded into media manager.      Follow Up Plan     Follow up in about 6 months (around 12/17/2025) for 6-month F/U.    Today's care plan and follow up schedule was discussed with patient.  Ravi verbalized understanding of the care plan, goals, and agrees to follow up plan.        The patient was encouraged to communicate with his/her health care provider/physician and care team regarding his/her condition(s) and treatment.  I provided the patient with my contact information today and encouraged to contact me via phone or [facility] Patient Portal as needed.     Length of Visit   Total Time: 30 Minutes

## 2025-06-20 ENCOUNTER — CLINICAL SUPPORT (OUTPATIENT)
Dept: NEPHROLOGY | Facility: CLINIC | Age: 70
End: 2025-06-20
Payer: MEDICARE

## 2025-06-20 ENCOUNTER — DOCUMENTATION ONLY (OUTPATIENT)
Dept: CARDIOLOGY | Facility: CLINIC | Age: 70
End: 2025-06-20
Payer: MEDICARE

## 2025-06-20 VITALS
HEART RATE: 57 BPM | DIASTOLIC BLOOD PRESSURE: 70 MMHG | BODY MASS INDEX: 26.99 KG/M2 | WEIGHT: 204.56 LBS | SYSTOLIC BLOOD PRESSURE: 150 MMHG | OXYGEN SATURATION: 96 %

## 2025-06-20 DIAGNOSIS — D63.8 ANEMIA OF CHRONIC DISEASE: Primary | ICD-10-CM

## 2025-06-20 PROCEDURE — 99999 PR PBB SHADOW E&M-EST. PATIENT-LVL II: CPT | Mod: PBBFAC,HCNC,,

## 2025-06-20 PROCEDURE — 96372 THER/PROPH/DIAG INJ SC/IM: CPT | Mod: HCNC,S$GLB,,

## 2025-06-20 NOTE — PROGRESS NOTES
"[facility] weekly phone follow up / triage call completed.     TCC RN Navigator spoke with pt    Current Patient reported weight: 190   Patient Goal Weight: (unsure)  Recent Patient reported blood pressure and heart rate: /64 P68    Pt reports the following:  []  Shortness of Breath with Activity  []  Shortness of Breath at rest   []  Fatigue  []  Edema   [] Chest pain or tightness  [] Weight Increase since discharge  [x] None of the above    Pt reports using "Daily weight and symptom tracker".    Pt reports being in the green (color) Zone. If in yellow/red, reminded that they should be calling [facility] today or now.     Medications:   Medication compliance reviewed with pt.  Pt reports having medication list available and has all medications at home for use per list.     Education:   Confirmed pt still has "[facility] Home Care Guide"  .     Reminded of key points as listed below.     Recommend 2 -3 gram sodium restriction and 1500 cc-2000 cc fluid restriction.  Encourage physical activity with graded exercise program.  Requested patient to weigh themselves daily, and to notify us if their weight increases by more than 3 lbs in 1 day or 5 lbs in 3 days.   Reminded to use "Daily weight and symptom tracker".  Even if pt does not have a scale, to use symptom tracker.       Watch for these Signs and Symptoms: If any of these occur, contact [facility] immediately:   Increase in shortness of breath with movement   Increase in swelling in your legs and ankles   Weight gain of more than 3 pounds in a day or 5 pounds in 3 days.   Difficulty breathing when you are lying down   Worsening fatigue or tiredness   Stomach bloating, a full feeling or a loss of appetite   Increased coughing--especially when you are lying down      Pt was able to verbalize back to RN in their own words correct diet/fluid restrictions, necessity for exercise, warning signs and symptoms, " when and how to contact their [facility] team.      Pt reminded of upcoming appointment.  PT reports they will attend. 6/27 10:00      Pt reminded of how and when to contact [facility]:  [phone](Mon-Fri, 8a-5p) & for urgent issues on the weekend to page the Heart Transplant MD on call.      Pt verbalized understanding and in agreement of plan.       Will follow up with pt at next clinic visit and RN navigator available for pt questions, issues or concerns.

## 2025-06-24 ENCOUNTER — TELEPHONE (OUTPATIENT)
Dept: NEPHROLOGY | Facility: CLINIC | Age: 70
End: 2025-06-24
Payer: MEDICARE

## 2025-06-24 DIAGNOSIS — D50.9 IRON DEFICIENCY ANEMIA, UNSPECIFIED IRON DEFICIENCY ANEMIA TYPE: Primary | ICD-10-CM

## 2025-06-24 RX ORDER — FERROUS SULFATE 325(65) MG
325 TABLET ORAL
Qty: 90 TABLET | Refills: 0 | Status: SHIPPED | OUTPATIENT
Start: 2025-06-24

## 2025-06-24 NOTE — TELEPHONE ENCOUNTER
Iron deficiency anemia, unspecified iron deficiency anemia type  -     ferrous sulfate (FEOSOL) 325 mg (65 mg iron) Tab tablet; Take 1 tablet (325 mg total) by mouth daily with breakfast.  Dispense: 90 tablet; Refill: 0      Lab Results   Component Value Date    IRON 31 (L) 06/19/2025    TIBC 283 06/19/2025    FERRITIN 816 (H) 04/01/2024    LABIRON 11 (L) 06/19/2025    HGB 8.5 (L) 06/19/2025     Patient remains Anemic below goal hemoglobin despite the use of EPO. Iron levels low and iron saturation low. Will start on oral iron replacement.

## 2025-06-25 NOTE — PROGRESS NOTES
Hgb 8.5 / Hct 29.5     Retacrit 4000 units .Per Form # DRORD-428. Tolerated well, no reactions noted. No concerns . Pt was given 2 week appt.      GFR 18   Use the 5 A's (Ask, Advise, Assess, Assist, Arrange)

## 2025-06-26 NOTE — PROGRESS NOTES
[facility] Provider Note (Follow-up) Consult Note      HPI:     Patient reports continued improvement in SOB from recent admission. Ambulating to clinic today and pace normal without issue              Patient denies orthopnea symptoms. Sleeping on 1 number of pillows              Patient wakes up SOB, has to get out of bed, associated cough- denies              Palpitations - denies recent              Dizzy, light-headed, pre-syncope or syncope- denies recent episodes of dizziness/lightheadedness of pre-syncope/syncope              Since discharge frequency of performing weights, home weight and weight change- performing home weights, reports uptrend in home weight 190->195lbs              Other information felt pertinent to HPI: Mr. Ravi Zamorano is a 67 yo male with a PMHx of AF, HFpEF, bicuspid AV, HTN, HLD, PAD, IDDM, ESRD s/p renal txp 2016, CKD 4 who presents to second [facility] enrollment following recent admission for ADHF. Presented to ED with worsening dyspnea, orthopnea, and LE edema. He was hypertensive (SBP >200), with pulmonary edema on CXR and BNP ~4000. Renal function remained at baseline (Cr 3.1). He was managed with IV diuretics for volume control, which led to marked symptomatic improvement. However, persistent hypertension required rebuilding his antihypertensive regimen in collaboration with Nephrology and Cardiology. Underwent FELICIANO with re-do PVI/AFL RFA yesterday per EP.        PHYSICAL:   Vitals:    06/27/25 0933   BP: (!) 183/85   Pulse: (!) 49        @[id number](3)@    JVD: above clavicle sitting  Heart rhythm: bradycardic  Cardiac murmur: No    S3: no  S4: no  Lungs: clear  Hepatojugular reflux: 2cm above clavicle sitting  Edema: trace edema in ankles    Lab Results   Component Value Date     06/17/2025     (L) 03/19/2025    K 4.4 06/17/2025    K 3.6 03/19/2025    MG 2.0 06/17/2025     06/17/2025     03/19/2025    CO2 28 06/17/2025    CO2 19 (L) 03/19/2025    BUN 71  (H) 06/17/2025    CREATININE 3.5 (H) 06/17/2025     (H) 06/17/2025     (H) 03/19/2025    CALCIUM 9.1 06/17/2025    CALCIUM 8.0 (L) 03/19/2025    AST 15 06/17/2025    AST 11 03/19/2025    ALT <5 (L) 06/17/2025    ALT <5 (L) 03/19/2025    ALBUMIN 3.3 (L) 06/17/2025    ALBUMIN 2.5 (L) 03/19/2025    PROT 6.4 06/17/2025    PROT 5.6 (L) 03/19/2025    BILITOT 0.4 06/17/2025    BILITOT 0.4 03/19/2025     Lab Results   Component Value Date    BNP 4,531 (H) 06/17/2025    BNP 3,050 (H) 06/13/2025    BNP 4,062 (H) 06/03/2025       Echo 6/4/25:    Left Ventricle: The left ventricle is normal in size. Ventricular mass is normal. Normal wall thickness. Normal wall motion. There is normal systolic function with a visually estimated ejection fraction of 55 - 60%. Ejection fraction is approximately 60%.    Right Ventricle: Systolic function is borderline low.    Left Atrium: The left atrium is severely dilated    Right Atrium: The right atrium is mildly dilated .    Aortic Valve: There is mild annular calcification present. There is mild aortic regurgitation.    Tricuspid Valve: There is mild regurgitation.    Pulmonary Artery: The estimated pulmonary artery systolic pressure is 36 mmHg.    IVC/SVC: Elevated venous pressure at 15 mmHg.    ASSESSMENT/PLAN:    1. Chronic diastolic heart failure  -NYHA Class II symptoms. Mild fluid volume on exam today compared to prior visit, though subjectively denies worsening symptoms. Recommend taking an additional 40mg prn torsemide dose in the afternoon for the next 2 days, then resuming torsemide 40mg daily with afternoon doses only prn.  -Cr today persistently elevated above prior baseline, discussed with Kidney Transplant team. Given preserved EF, discontinue entresto and uptitrate BiDil to 2 tablets three times daily for additional BP management. Pending continued BP trend, may need to resume catapres in future. 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   -Recommend  2-3 gram sodium restriction and 1500cc- 2000cc fluid restriction.  -Requested patient to weigh themselves daily, and to notify us if their weight increases by more than 3 lbs in 1 day or 5 lbs in 3 days.  -RTC in 2 weeks or sooner prn. Phone check in next week to review home BP log    2. Atrial flutter, unspecified type   -s/p FELICIANO with re-do PVI/AFL RFA on 6/12 per EP   -Continue lopressor and xarelto    3. Paroxysmal atrial fibrillation   -s/p FELICIANO with re-do PVI/AFL RFA on 6/12 per EP  -EKG today with probable sinus bradycardia with sinus arrhyhtmia and PACs   -Continue lopressor and xarelto    4. S/P cadaveric kidney transplant 11/5/2016. ESRD secondary to HTN/DMII  -Worsening renal function over the last 6 months on chart review. Labs routed to Kidney Txp team for review  -Antihypertensive adjustments as above    5. CKD IV (severe)   -Cr 3.3 today, adjustments as above   -Will need close Nephrology follow up     Paris Lujan PA-C

## 2025-06-27 ENCOUNTER — OFFICE VISIT (OUTPATIENT)
Dept: CARDIOLOGY | Facility: CLINIC | Age: 70
End: 2025-06-27
Payer: MEDICARE

## 2025-06-27 ENCOUNTER — CLINICAL SUPPORT (OUTPATIENT)
Dept: NEPHROLOGY | Facility: CLINIC | Age: 70
End: 2025-06-27
Payer: MEDICARE

## 2025-06-27 ENCOUNTER — DOCUMENTATION ONLY (OUTPATIENT)
Dept: CARDIOLOGY | Facility: CLINIC | Age: 70
End: 2025-06-27
Payer: MEDICARE

## 2025-06-27 ENCOUNTER — HOSPITAL ENCOUNTER (OUTPATIENT)
Dept: CARDIOLOGY | Facility: CLINIC | Age: 70
Discharge: HOME | End: 2025-06-27
Payer: MEDICARE

## 2025-06-27 ENCOUNTER — LAB VISIT (OUTPATIENT)
Dept: LAB | Facility: HOSPITAL | Age: 70
End: 2025-06-27
Payer: MEDICARE

## 2025-06-27 ENCOUNTER — TELEPHONE (OUTPATIENT)
Dept: CARDIOLOGY | Facility: CLINIC | Age: 70
End: 2025-06-27
Payer: MEDICARE

## 2025-06-27 VITALS
OXYGEN SATURATION: 99 % | DIASTOLIC BLOOD PRESSURE: 85 MMHG | BODY MASS INDEX: 27.86 KG/M2 | HEART RATE: 49 BPM | WEIGHT: 210.19 LBS | HEIGHT: 73 IN | SYSTOLIC BLOOD PRESSURE: 183 MMHG

## 2025-06-27 DIAGNOSIS — Z94.0 S/P KIDNEY TRANSPLANT: ICD-10-CM

## 2025-06-27 DIAGNOSIS — D63.8 ANEMIA OF CHRONIC DISEASE: Primary | ICD-10-CM

## 2025-06-27 DIAGNOSIS — R00.1 BRADYCARDIA: ICD-10-CM

## 2025-06-27 DIAGNOSIS — D63.8 ANEMIA OF CHRONIC DISEASE: ICD-10-CM

## 2025-06-27 DIAGNOSIS — N18.4 CHRONIC KIDNEY DISEASE (CKD), STAGE IV (SEVERE): ICD-10-CM

## 2025-06-27 DIAGNOSIS — R06.02 SOB (SHORTNESS OF BREATH): ICD-10-CM

## 2025-06-27 DIAGNOSIS — I48.92 ATRIAL FLUTTER, UNSPECIFIED TYPE: ICD-10-CM

## 2025-06-27 DIAGNOSIS — I50.32 CHRONIC DIASTOLIC CONGESTIVE HEART FAILURE: Primary | ICD-10-CM

## 2025-06-27 DIAGNOSIS — I48.0 PAROXYSMAL ATRIAL FIBRILLATION: ICD-10-CM

## 2025-06-27 LAB
ALBUMIN SERPL BCP-MCNC: 3.1 G/DL (ref 3.5–5.2)
ALP SERPL-CCNC: 50 UNIT/L (ref 40–150)
ALT SERPL W/O P-5'-P-CCNC: <5 UNIT/L (ref 10–44)
ANION GAP (OHS): 11 MMOL/L (ref 8–16)
AST SERPL-CCNC: 12 UNIT/L (ref 11–45)
BILIRUB SERPL-MCNC: 0.4 MG/DL (ref 0.1–1)
BNP SERPL-MCNC: >4900 PG/ML (ref 0–99)
BUN SERPL-MCNC: 56 MG/DL (ref 8–23)
CALCIUM SERPL-MCNC: 8.7 MG/DL (ref 8.7–10.5)
CHLORIDE SERPL-SCNC: 104 MMOL/L (ref 95–110)
CO2 SERPL-SCNC: 26 MMOL/L (ref 23–29)
CREAT SERPL-MCNC: 3.3 MG/DL (ref 0.5–1.4)
GFR SERPLBLD CREATININE-BSD FMLA CKD-EPI: 20 ML/MIN/1.73/M2
GLUCOSE SERPL-MCNC: 105 MG/DL (ref 70–110)
HCT VFR BLD AUTO: 30.1 % (ref 40–54)
HGB BLD-MCNC: 8.8 GM/DL (ref 14–18)
IRON SATN MFR SERPL: 10 % (ref 20–50)
IRON SERPL-MCNC: 29 UG/DL (ref 45–160)
MAGNESIUM SERPL-MCNC: 1.9 MG/DL (ref 1.6–2.6)
OHS QRS DURATION: 130 MS
OHS QTC CALCULATION: 451 MS
PHOSPHATE SERPL-MCNC: 4.4 MG/DL (ref 2.7–4.5)
POTASSIUM SERPL-SCNC: 3.5 MMOL/L (ref 3.5–5.1)
PROT SERPL-MCNC: 6.3 GM/DL (ref 6–8.4)
SODIUM SERPL-SCNC: 141 MMOL/L (ref 136–145)
TIBC SERPL-MCNC: 280 UG/DL (ref 250–450)
TRANSFERRIN SERPL-MCNC: 189 MG/DL (ref 200–375)

## 2025-06-27 PROCEDURE — 84100 ASSAY OF PHOSPHORUS: CPT | Mod: HCNC

## 2025-06-27 PROCEDURE — 93010 ELECTROCARDIOGRAM REPORT: CPT | Mod: HCNC,S$GLB,, | Performed by: INTERNAL MEDICINE

## 2025-06-27 PROCEDURE — 99999 PR PBB SHADOW E&M-EST. PATIENT-LVL III: CPT | Mod: PBBFAC,HCNC,,

## 2025-06-27 PROCEDURE — 85014 HEMATOCRIT: CPT | Mod: HCNC

## 2025-06-27 PROCEDURE — 83735 ASSAY OF MAGNESIUM: CPT | Mod: HCNC

## 2025-06-27 PROCEDURE — 99999 PR PBB SHADOW E&M-EST. PATIENT-LVL I: CPT | Mod: PBBFAC,HCNC,,

## 2025-06-27 PROCEDURE — 85018 HEMOGLOBIN: CPT | Mod: HCNC

## 2025-06-27 PROCEDURE — 36415 COLL VENOUS BLD VENIPUNCTURE: CPT | Mod: HCNC

## 2025-06-27 PROCEDURE — 83880 ASSAY OF NATRIURETIC PEPTIDE: CPT | Mod: HCNC

## 2025-06-27 PROCEDURE — 84466 ASSAY OF TRANSFERRIN: CPT | Mod: HCNC

## 2025-06-27 PROCEDURE — 80053 COMPREHEN METABOLIC PANEL: CPT | Mod: HCNC

## 2025-06-27 RX ORDER — ISOSORBIDE DINITRATE AND HYDRALAZINE HYDROCHLORIDE 37.5; 2 MG/1; MG/1
2 TABLET ORAL 3 TIMES DAILY
Qty: 180 TABLET | Refills: 11 | Status: SHIPPED | OUTPATIENT
Start: 2025-06-27

## 2025-06-27 RX ORDER — ISOSORBIDE DINITRATE AND HYDRALAZINE HYDROCHLORIDE 37.5; 2 MG/1; MG/1
1 TABLET ORAL 3 TIMES DAILY
Qty: 90 TABLET | Refills: 11 | Status: SHIPPED | OUTPATIENT
Start: 2025-06-27 | End: 2025-06-27

## 2025-06-27 NOTE — TELEPHONE ENCOUNTER
Per NICKI Lujan, can we call and let him know I reviewed with his Kidney transplant team. recommend stopping entresto, and doubling up on BiDil, taking 2 tablets three times daily. I'd like him to keep a blood pressure log for us to review next week and RTC in 2 weeks or sooner prn.reviewed ABIs and do not suspect PAD is causing L thigh pain. recommend reaching out to PCP for further evaluation.     Pt initially was very confused about what he was actually taking but eventually stated understanding.

## 2025-06-27 NOTE — PATIENT INSTRUCTIONS
Will discuss current medication regimen with transplant team and call today with further recommendations.    Recommend taking an additional 40mg torsemide in the afternoon for the next 2 days. Then resuming torsemide 40mg once daily with afternoon doses only as needed.    Notify us ([phone]) with any worsening shortness of breath, swelling or 3lb weight gain in 1 day/5lb weight gain in 1 week on home scale.

## 2025-06-30 ENCOUNTER — OUTPATIENT CASE MANAGEMENT (OUTPATIENT)
Dept: ADMINISTRATIVE | Facility: OTHER | Age: 70
End: 2025-06-30
Payer: MEDICARE

## 2025-06-30 ENCOUNTER — TELEPHONE (OUTPATIENT)
Dept: CARDIOLOGY | Facility: CLINIC | Age: 70
End: 2025-06-30
Payer: MEDICARE

## 2025-06-30 ENCOUNTER — PATIENT OUTREACH (OUTPATIENT)
Dept: ADMINISTRATIVE | Facility: OTHER | Age: 70
End: 2025-06-30
Payer: MEDICARE

## 2025-06-30 VITALS — DIASTOLIC BLOOD PRESSURE: 66 MMHG | SYSTOLIC BLOOD PRESSURE: 155 MMHG

## 2025-06-30 VITALS — SYSTOLIC BLOOD PRESSURE: 154 MMHG | HEART RATE: 67 BPM | DIASTOLIC BLOOD PRESSURE: 72 MMHG | OXYGEN SATURATION: 96 %

## 2025-06-30 NOTE — TELEPHONE ENCOUNTER
Patient called. Requested to reschedule to 7/16. Appt. request put in. Patient reports doing well. No symptoms.

## 2025-06-30 NOTE — PROGRESS NOTES
Hgb 8.8 / Hct 30.1     Retacrit 4000 units .Per Form # DRORD-428. Tolerated well, no reactions noted. No concerns . Pt was given 2 week appt.      GFR 20

## 2025-06-30 NOTE — LETTER
Ravi Zamorano  [address] [facility] [address]      Dear Ravi Zamorano,     Welcome to [facility] Outpatient Care Management Program. We are here to assist patients with multiple long-term (chronic) conditions who often need more personalized healthcare.    It was a pleasure talking with you today. My name is Urvashi Scales RN. I look forward to working with you as your Care Manager. I will be contacting you by telephone routinely to help coordinate care and resolve issues.    My goal is to help you function at the healthiest and highest level possible. You can contact me directly at [phone].    As an [facility] patient with [employer], some of the services we provide, at no cost to you, include:     Development of an individualized care plan with a Registered Nurse   Connection with a   Assistance from a Community Health Worker  Connection with available resources and services    Coordinate communication among your care team members   Provide coaching and education  Help you understand your doctor's treatment plan  Help you obtain information about your insurance coverage.    All services provided by [facility]s Outpatient Care Managers and other care team members are coordinated with and communicated to your primary care team.      As part of your enrollment, you will be receiving education materials and more information about these services in your [facility] account, by phone, or through the mail. If you do not wish to participate or receive information, you can Opt Out by contacting our office at [phone].     [facility] Patient Rights and Responsibilities available upon request.    Sincerely,      Urvashi Scales RN  [facility]   Outpatient Care Management

## 2025-06-30 NOTE — PROGRESS NOTES
This Community Health Worker (CHW) completed Social Determinant of Health (SDOH)  Questionnaire with patient/caregiver via telephone today.  Notified OPCM CM Urvashi Scales RN of completion.      Patient denied any SDOH needs at this time.

## 2025-06-30 NOTE — PROGRESS NOTES
Outpatient Care Management  Initial Patient Assessment    Patient: Ravi Zamorano  MRN: [id number]  Date of Service: 06/30/2025  Completed by: Urvashi Scales RN  Referral Date: 06/04/2025  Date of Eligibility: 6/4/2025  Program:   High Risk  Status: Ongoing  Effective Dates: 6/30/2025 - present  Responsible Staff: Urvashi Scales RN        Reason for Visit   Patient presents with    Nursing Assessment     6/30/2025    OPCM Enrollment Call     6/30/2025       Brief Summary:  Ravi Zamorano was referred by Dr. Devin Dubois, hospital provider, for heart failure. Patient qualifies for program based on high risk. Patient with risk score of 59.4%.  Active problem list, medical, surgical and social history reviewed. Active comorbidities include diabetes, diabetic polyneuropathy, HLD, depression, anxiety, heart failure, kidney transplant in 2016, ESRD (was on dialysis prior to kidney transplant, no longer on dialysis, Afib, HTN, BPH, h/o CVA. Areas of need identified by patient include education on chronic conditions of heart failure, Afib, hypertension, diabetes. Logs for blood pressure, weight, and blood sugar, order [employer] meals.    Next steps: RN to educate patient on chronic conditions of hypertension, heart failure, Afib,a and diabetes. RN to mail blood pressure, weight, and blood sugar logs. RN to order [employer] meals. Referral sent to [facility] to complete SDOH.     Disability Status  Is the patient alert and oriented (person, place, time, and situation)?: Alert and oriented x 4  Hearing Difficulty or Deaf: no  Visual Difficulty or Blind: no  Visual and Hearing Conclusion Statement: Patient does not use hearing aids or glasses  Difficulty Concentrating, Remembering or Making Decisions: no  Communication Difficulty: no  Eating/Swallowing Difficulty: no  Walking or Climbing Stairs Difficulty: yes  Walking or Climbing Stairs: ambulation difficulty, requires equipment  Mobility Management: Patient uses a cane for  balance  Dressing/Bathing Difficulty: no  Grooming: independent  Transferring (e.g., getting in and out of chairs): independent  Toileting : Independent  Continence : Continence - Not a problem  Difficulty Managing Errands Independently: no (Patient can drive but spouse usually drives and takes patient to his appointments.)  Equipment Currently Used at Home: cane, straight; blood pressure machine; glucometer; scale  ADL Conclusion Statement: Patient is independent with adls  Change in Functional Status Since Onset of Current Illness/Injury: no        Spiritual Beliefs  Spiritual, Cultural Beliefs, Mandaeism Practices, Values that Affect Care: no      Social History     Socioeconomic History    Marital status:    Occupational History     Employer: [employer]   Tobacco Use    Smoking status: Former     Current packs/day: 0.00     Average packs/day: 0.5 packs/day for 32.0 years (16.0 ttl pk-yrs)     Types: Cigarettes     Start date: 1984     Quit date: 2016     Years since quittin.5    Smokeless tobacco: Never   Vaping Use    Vaping status: Never Used   Substance and Sexual Activity    Alcohol use: Not Currently    Drug use: No    Sexual activity: Yes     Partners: Female   Social History Narrative     for 14 years     for 38 years    2 children ages 41, 42     Social Drivers of Health     Financial Resource Strain: Low Risk  (6/3/2025)    Overall Financial Resource Strain (CARDIA)     Difficulty of Paying Living Expenses: Not very hard   Food Insecurity: No Food Insecurity (6/3/2025)    Hunger Vital Sign     Worried About Running Out of Food in the Last Year: Never true     Ran Out of Food in the Last Year: Never true   Transportation Needs: No Transportation Needs (6/3/2025)    PRAPARE - Transportation     Lack of Transportation (Medical): No     Lack of Transportation (Non-Medical): No   Physical Activity: Inactive (6/3/2025)    Exercise Vital Sign     Days of  Exercise per Week: 0 days     Minutes of Exercise per Session: 0 min   Stress: No Stress Concern Present (6/3/2025)    [employer] - Occupational Stress Questionnaire     Feeling of Stress : Only a little   Housing Stability: Low Risk  (6/3/2025)    Housing Stability Vital Sign     Unable to Pay for Housing in the Last Year: No     Homeless in the Last Year: No       Roles and Relationships  Primary Source of Support/Comfort: spouse  Name of Support/Comfort Primary Source: Erika  Secondary Source of Support/Comfort: no one  Name of Support/Comfort Secondary Source: Every one else lives out of town      Advance Directives (For Healthcare)  Advance Directive  (If Adv Dir status is received, view document under Adv Dir in header or Chart Review Media tab): Patient does not have Advance Directive, declines information.  Patient Requests Assistance: Patient will do independently        Patient Reported Insurance  Verified current insurance plan:: [employer] Medicare Advantage  [employer] benefits discussed:: OTC Prescription Discounts; Well Dine; Transportation; Silver Sneakers; Mail Order Pharmacy            6/30/2025    11:58 AM 6/27/2025     9:39 AM 6/13/2025     9:38 AM 1/3/2025    10:13 AM 5/31/2024     8:46 AM 3/5/2024     1:22 PM 10/10/2023     8:42 AM   Depression Patient Health Questionnaire   Over the last two weeks how often have you been bothered by little interest or pleasure in doing things Not at all Not at all Not at all Not at all Not at all Not at all Not at all   Over the last two weeks how often have you been bothered by feeling down, depressed or hopeless Not at all Not at all Not at all Not at all Not at all Not at all Not at all   PHQ-2 Total Score 0 0 0 0 0 0 0       Learning Assessment       06/30/2025 1222 [facility] (6/30/2025 - Present)   Created by Urvashi Scales RN -  (Nurse) Status: Complete                 PRIMARY LEARNER     Primary Learner  Name:  Ravi Zamorano  - 06/30/2025 1222    Relationship:  Patient  - 06/30/2025 1222    Does the primary learner have any barriers to learning?:  No Barriers  - 06/30/2025 1222    What is the preferred language of the primary learner?:  English  - 06/30/2025 1222    Is an  required?:  No  - 06/30/2025 1222    How does the primary learner prefer to learn new concepts?:  Listening  - 06/30/2025 1222    How often do you need to have someone help you read instructions, pamphlets, or written material from your doctor or pharmacy?:  Rarely  - 06/30/2025 1222        CO-LEARNER #1     Co-Learner Name (if applicable):  Erika Zamorano  - 06/30/2025 1222    Relationship:  Family  - 06/30/2025 1222    Does the co-learner have any barriers to learning?:  No Barriers  - 06/30/2025 1222    What is the preferred language of the co-learner?:  English  - 06/30/2025 1222    Is an  required?:  No  - 06/30/2025 1222    How does the co-learner prefer to learn new concepts?:  Listening  - 06/30/2025 1222        CO-LEARNER #2     No question answered        SPECIAL TOPICS     No question answered        ANSWERED BY:     No question answered        Comments         Edit History       Urvashi Scales, RN -  (Nurse)   06/30/2025 1222

## 2025-07-02 ENCOUNTER — LAB VISIT (OUTPATIENT)
Dept: LAB | Facility: HOSPITAL | Age: 70
End: 2025-07-02
Attending: STUDENT IN AN ORGANIZED HEALTH CARE EDUCATION/TRAINING PROGRAM
Payer: MEDICARE

## 2025-07-02 ENCOUNTER — TELEPHONE (OUTPATIENT)
Dept: CARDIOLOGY | Facility: CLINIC | Age: 70
End: 2025-07-02
Payer: MEDICARE

## 2025-07-02 ENCOUNTER — DOCUMENTATION ONLY (OUTPATIENT)
Dept: CARDIOLOGY | Facility: CLINIC | Age: 70
End: 2025-07-02
Payer: MEDICARE

## 2025-07-02 DIAGNOSIS — N18.32 CHRONIC KIDNEY DISEASE-MINERAL BONE DISORDER (CKD-MBD) WITH STAGE 3B CHRONIC KIDNEY DISEASE: ICD-10-CM

## 2025-07-02 DIAGNOSIS — M89.9 CHRONIC KIDNEY DISEASE-MINERAL BONE DISORDER (CKD-MBD) WITH STAGE 3B CHRONIC KIDNEY DISEASE: ICD-10-CM

## 2025-07-02 DIAGNOSIS — D63.8 ANEMIA OF CHRONIC DISEASE: ICD-10-CM

## 2025-07-02 DIAGNOSIS — Z79.4 TYPE 2 DIABETES MELLITUS WITH STAGE 3A CHRONIC KIDNEY DISEASE, WITH LONG-TERM CURRENT USE OF INSULIN: ICD-10-CM

## 2025-07-02 DIAGNOSIS — N18.31 TYPE 2 DIABETES MELLITUS WITH STAGE 3A CHRONIC KIDNEY DISEASE, WITH LONG-TERM CURRENT USE OF INSULIN: ICD-10-CM

## 2025-07-02 DIAGNOSIS — E11.22 TYPE 2 DIABETES MELLITUS WITH STAGE 3A CHRONIC KIDNEY DISEASE, WITH LONG-TERM CURRENT USE OF INSULIN: ICD-10-CM

## 2025-07-02 DIAGNOSIS — E83.9 CHRONIC KIDNEY DISEASE-MINERAL BONE DISORDER (CKD-MBD) WITH STAGE 3B CHRONIC KIDNEY DISEASE: ICD-10-CM

## 2025-07-02 DIAGNOSIS — D50.9 IRON DEFICIENCY ANEMIA, UNSPECIFIED IRON DEFICIENCY ANEMIA TYPE: Primary | ICD-10-CM

## 2025-07-02 LAB
HCT VFR BLD AUTO: 30 % (ref 40–54)
HGB BLD-MCNC: 8.8 GM/DL (ref 14–18)
IRON SATN MFR SERPL: 12 % (ref 20–50)
IRON SERPL-MCNC: 35 UG/DL (ref 45–160)
TIBC SERPL-MCNC: 297 UG/DL (ref 250–450)
TRANSFERRIN SERPL-MCNC: 201 MG/DL (ref 200–375)

## 2025-07-02 PROCEDURE — 84466 ASSAY OF TRANSFERRIN: CPT | Mod: HCNC

## 2025-07-02 PROCEDURE — 85018 HEMOGLOBIN: CPT | Mod: HCNC

## 2025-07-02 PROCEDURE — 85014 HEMATOCRIT: CPT | Mod: HCNC

## 2025-07-02 PROCEDURE — 36415 COLL VENOUS BLD VENIPUNCTURE: CPT | Mod: HCNC,PN

## 2025-07-02 RX ORDER — METOLAZONE 2.5 MG/1
2.5 TABLET ORAL ONCE
Qty: 1 TABLET | Refills: 0 | Status: SHIPPED | OUTPATIENT
Start: 2025-07-02 | End: 2025-07-02

## 2025-07-02 RX ORDER — POTASSIUM CHLORIDE 20 MEQ/1
20 TABLET, EXTENDED RELEASE ORAL ONCE
Qty: 1 TABLET | Refills: 0 | Status: SHIPPED | OUTPATIENT
Start: 2025-07-02 | End: 2025-07-02

## 2025-07-02 RX ORDER — ERGOCALCIFEROL 1.25 MG/1
50000 CAPSULE ORAL
Qty: 4 CAPSULE | Refills: 6 | Status: SHIPPED | OUTPATIENT
Start: 2025-07-02 | End: 2025-09-01

## 2025-07-02 NOTE — PROGRESS NOTES
Taking Bidil AM dose between 6-9am Starting with today's /71 P60 /44 P 55 /62 P67 /59 P 65 /72 forgot pulse  Informed NICKI Lujan. No new orders. Patient informed.

## 2025-07-02 NOTE — TELEPHONE ENCOUNTER
No care due was identified.  [facility] Embedded Care Due Messages. Reference number: [id number].   7/02/2025 6:58:24 AM CDT

## 2025-07-02 NOTE — TELEPHONE ENCOUNTER
Refill Routing Note   Medication(s) are not appropriate for processing by [facility] for the following reason(s):        ED/Hospital Visit since last OV with provider  Required labs abnormal    [facility] action(s):  Defer        Medication Therapy Plan: ED visit for T2D, CHF and history of CVA. No FOVS. Pending 90 with 0    Pharmacist review requested: Yes     Appointments  past 12m or future 3m with PCP    Date Provider   Last Visit   5/20/2025 Mima Mack MD   Next Visit   Visit date not found Mima Mack MD   ED visits in past 90 days: 0        Note composed:1:20 PM 07/02/2025

## 2025-07-02 NOTE — TELEPHONE ENCOUNTER
Refill Routing Note   Medication(s) are not appropriate for processing by [facility] for the following reason(s):        ED/Hospital Visit since last OV with provider  Required labs abnormal    [facility] action(s):  Defer           Pharmacist review requested: Yes     Appointments  past 12m or future 3m with PCP    Date Provider   Last Visit   5/20/2025 Mima Mack MD   Next Visit   Visit date not found Mima Mack MD   ED visits in past 90 days: 0        Note composed:1:33 PM 07/02/2025

## 2025-07-02 NOTE — TELEPHONE ENCOUNTER
"[facility]) weekly phone follow up / triage call completed.     [facility] RN Navigator spoke with PT    Current Patient reported weight: 200.0 lbs   Patient Goal Weight: (Unsure)  Recent Patient reported blood pressure and heart rate: BP-107/71 P-60    Pt reports the following:  []  Shortness of Breath with Activity  []  Shortness of Breath at rest   []  Fatigue  [x]  Edema D/CBilat Foot edema has not gone down since Hospital D/C  [] Chest pain or tightness  [] Weight Increase since discharge  [] None of the above    Pt reports using "Daily weight and symptom tracker".    Pt reports being in the Green(color) Zone. If in yellow/red, reminded that they should be calling [facility] today or now.     Medications:   Medication compliance reviewed with pt.  Pt reports having medication list available and has all medications at home for use per list. PT states he is still taking the increased BiDil dose.    Education:   Confirmed pt still has "[facility] Home Care Guide"  .     Reminded of key points as listed below.     Recommend 2 -3 gram sodium restriction and 1500 cc-2000 cc fluid restriction.  Encourage physical activity with graded exercise program.  Requested patient to weigh themselves daily, and to notify us if their weight increases by more than 3 lbs in 1 day or 5 lbs in 3 days.   Reminded to use "Daily weight and symptom tracker".  Even if pt does not have a scale, to use symptom tracker.       Watch for these Signs and Symptoms: If any of these occur, contact [facility] immediately:   Increase in shortness of breath with movement   Increase in swelling in your legs and ankles   Weight gain of more than 3 pounds in a day or 5 pounds in 3 days.   Difficulty breathing when you are lying down   Worsening fatigue or tiredness   Stomach bloating, a full feeling or a loss of appetite   Increased coughing--especially when you are lying down      Pt was able to verbalize " back to RN in their own words correct diet/fluid restrictions, necessity for exercise, warning signs and symptoms, when and how to contact their [facility] team.      Pt reminded of upcoming [facility] call 7-3-25 to check to see if he got his Metolazone from pharmacy.  PT  will also have a weekly call Monday to check Friday's wt to see how much wt the metolazone pulled off.      Pt reminded of how and when to contact [facility]:  [phone](Mon-Fri, 8a-5p) & for urgent issues on the weekend to page the Heart Transplant MD on call.  Pt also encouraged utilize [facility] messaging as well.      Pt  verbalized understanding and in agreement of above plan.   MARK Lujan's med changes are below;  Re BP log in AMOS Romero' note    Still elevated but better than what it was. We'll continue increased BiDil dosing.    Weekly call done PT's wt is 200 lbs, denies SOB or CP. States he really can't get his feet higher than his heart even with Pillows at the foot of recliner so his feet have not really gone down. He understands to continue increased BiDil and will call us PRN between next call.     So he's up 5 more lbs since we saw him?   I guess so, I was looking for a Dry wt but did not check your last note, sorry. Recs?     He just refilled the BiDil today and told me he is taking 2 tabs three times a day. I told him his wt is up by 5 lbs and I will be calling him back if you have recs.   Increase torsemide to 40mg BID with repeat labs Monday   Please schedule Labs for me Maryan, Thanks!   Informed PT that Maryan is gone for the day so don't look for labs till tomorrow.    [place] PT states he is taking Torsemide (20 mg tablets) 3 tabs Daily     I'll call in one time dose of 2.5 metolazone given his 10lb weight gain   and I'm calling a one time dose of K to take with metolazone     Called PT and went over everything, he sounded a little overwhelmed so I am calling him @ 0800 to go over your orders, to see if he picked up Metolazone & K and  to tell him about lab time as it looks like he doesn't use the portal. I will set calls for tomorrow and Monday to call him for Friday's wt(will get Sat/Sun/Mon as well).     Will follow up with pt at next clinic visit and RN navigator available for pt questions, issues or concerns.

## 2025-07-03 ENCOUNTER — CLINICAL SUPPORT (OUTPATIENT)
Dept: NEPHROLOGY | Facility: CLINIC | Age: 70
End: 2025-07-03
Payer: MEDICARE

## 2025-07-03 VITALS
OXYGEN SATURATION: 98 % | BODY MASS INDEX: 27.75 KG/M2 | SYSTOLIC BLOOD PRESSURE: 160 MMHG | WEIGHT: 210.31 LBS | DIASTOLIC BLOOD PRESSURE: 80 MMHG | HEART RATE: 76 BPM

## 2025-07-03 DIAGNOSIS — D63.8 ANEMIA OF CHRONIC DISEASE: Primary | ICD-10-CM

## 2025-07-03 PROCEDURE — 99999 PR PBB SHADOW E&M-EST. PATIENT-LVL II: CPT | Mod: PBBFAC,HCNC,,

## 2025-07-03 PROCEDURE — 96372 THER/PROPH/DIAG INJ SC/IM: CPT | Mod: HCNC,S$GLB,,

## 2025-07-03 RX ORDER — POTASSIUM CHLORIDE 20 MEQ/1
TABLET, EXTENDED RELEASE ORAL
Qty: 1 TABLET | Refills: 0 | OUTPATIENT
Start: 2025-07-03

## 2025-07-03 RX ORDER — METOLAZONE 2.5 MG/1
TABLET ORAL
Qty: 1 TABLET | Refills: 0 | OUTPATIENT
Start: 2025-07-03

## 2025-07-03 NOTE — TELEPHONE ENCOUNTER
7-2-25 OOS Call to PT with [facility] lab results and POC changes as follows;    Weekly call done PT's wt is 200 lbs, denies SOB or CP. States he really can't get his feet higher than his heart even with Pillows at the foot of recliner so his feet have not really gone down. He understands to continue increased BiDil and will call us PRN between next call.   he can continue the torsemide like he's been doing. we'll just do the one time dose of metolazone.  Repeat labs Scheduled 7/7@11:45 [facility].    [place] Wife says PT picking up Metolazone & K now and will take today(afternoon) she is leaving town Sat and can drive him to labs Friday PM if lab open. PT doesn't drive, is that acceptable??    Wife said leave Labs as is(Clinics are closed) and she will get it done, Thanks for trying. She won't be back till the 14th.     So labs on Monday ?  Yes Ma'am and keep them where they are please. Wife verbalizes all of the above POC changes and states PT has them written down.

## 2025-07-07 ENCOUNTER — TELEPHONE (OUTPATIENT)
Dept: CARDIOLOGY | Facility: CLINIC | Age: 70
End: 2025-07-07
Payer: MEDICARE

## 2025-07-07 ENCOUNTER — OUTPATIENT CASE MANAGEMENT (OUTPATIENT)
Dept: ADMINISTRATIVE | Facility: OTHER | Age: 70
End: 2025-07-07
Payer: MEDICARE

## 2025-07-07 ENCOUNTER — LAB VISIT (OUTPATIENT)
Dept: LAB | Facility: HOSPITAL | Age: 70
End: 2025-07-07
Payer: MEDICARE

## 2025-07-07 DIAGNOSIS — R06.02 SOB (SHORTNESS OF BREATH): ICD-10-CM

## 2025-07-07 LAB
ALBUMIN SERPL BCP-MCNC: 3.4 G/DL (ref 3.5–5.2)
ALP SERPL-CCNC: 55 UNIT/L (ref 40–150)
ALT SERPL W/O P-5'-P-CCNC: <5 UNIT/L (ref 10–44)
ANION GAP (OHS): 10 MMOL/L (ref 8–16)
AST SERPL-CCNC: 13 UNIT/L (ref 11–45)
BILIRUB SERPL-MCNC: 0.4 MG/DL (ref 0.1–1)
BUN SERPL-MCNC: 69 MG/DL (ref 8–23)
CALCIUM SERPL-MCNC: 9 MG/DL (ref 8.7–10.5)
CHLORIDE SERPL-SCNC: 100 MMOL/L (ref 95–110)
CO2 SERPL-SCNC: 28 MMOL/L (ref 23–29)
CREAT SERPL-MCNC: 3.8 MG/DL (ref 0.5–1.4)
GFR SERPLBLD CREATININE-BSD FMLA CKD-EPI: 17 ML/MIN/1.73/M2
GLUCOSE SERPL-MCNC: 118 MG/DL (ref 70–110)
POTASSIUM SERPL-SCNC: 3.6 MMOL/L (ref 3.5–5.1)
PROT SERPL-MCNC: 6.6 GM/DL (ref 6–8.4)
SODIUM SERPL-SCNC: 138 MMOL/L (ref 136–145)

## 2025-07-07 PROCEDURE — 83880 ASSAY OF NATRIURETIC PEPTIDE: CPT | Mod: HCNC

## 2025-07-07 PROCEDURE — 36415 COLL VENOUS BLD VENIPUNCTURE: CPT | Mod: HCNC,PN

## 2025-07-07 PROCEDURE — 84460 ALANINE AMINO (ALT) (SGPT): CPT | Mod: HCNC

## 2025-07-07 NOTE — PROGRESS NOTES
Hgb 8.8 / Hct 30.0     Retacrit 4000 units .Per Form # [id number]. Tolerated well, no reactions noted. No concerns . Pt was given 2 week appt.      GFR 20

## 2025-07-07 NOTE — TELEPHONE ENCOUNTER
"[facility]) weekly phone follow up / triage call completed.     [facility] Nurse Navigator spoke with Mr. Ravi Zamorano.    Current Patient reported weight: 190lbs   Patient Goal Weight: (unsure)  Recent Patient reported blood pressure and heart rate: no vitals taken today.     The pt also had labs done today his review is as follows.     Can we call and see how much weight he is down following metolazone dose? Kidney function still lower than his prior baseline on labs today, but comparable to where he's trended since hospital stay. Lycori wnl. I'm going to message his nephrologist for close follow up.     Pt reports the following:  []  Shortness of Breath with Activity  []  Shortness of Breath at rest   []  Fatigue  []  Edema   [] Chest pain or tightness  [] Weight Increase since discharge  [x] None of the above    Pt reports using "Daily weight and symptom tracker".    Pt reports being in the Green (color) Zone. If in yellow/red, reminded that they should be calling [facility] today or now.     Medications:   Medication compliance reviewed with pt.  Pt reports having medication list available and has all medications at home for use per list. The pt's weight is down 10lbs since being prescribed Metolazone 7/3/2025. This was reported to our PA-C who recommend holding off on further adjustments.      Education:   Confirmed pt still has "[facility] Home Care Guide"  .     Reminded of key points as listed below.     Recommend 2 -3 gram sodium restriction and 1500 cc-2000 cc fluid restriction.  Encourage physical activity with graded exercise program.  Requested patient to weigh themselves daily, and to notify us if their weight increases by more than 3 lbs in 1 day or 5 lbs in 3 days.   Reminded to use "Daily weight and symptom tracker".  Even if pt does not have a scale, to use symptom tracker.       Watch for these Signs and Symptoms: If any of these occur, contact " [facility] immediately:   Increase in shortness of breath with movement   Increase in swelling in your legs and ankles   Weight gain of more than 3 pounds in a day or 5 pounds in 3 days.   Difficulty breathing when you are lying down   Worsening fatigue or tiredness   Stomach bloating, a full feeling or a loss of appetite   Increased coughing--especially when you are lying down      Pt was able to verbalize back to LPN in their own words correct diet/fluid restrictions, necessity for exercise, warning signs and symptoms, when and how to contact their [facility] team.      Pt reminded of upcoming appointment.  PT reports they will attend. 7/16/2025 for 11:00a with labs for 10:30a.      Pt reminded of how and when to contact [facility]:  [phone](Mon-Fri, 8a-5p) & for urgent issues on the weekend to page the Heart Transplant MD on call.  Pt also encouraged utilize [facility] messaging as well.      Pt verbalized understanding and in agreement of plan.       Will follow up with pt at next clinic visit and Nurse navigator available for pt questions, issues or concerns.

## 2025-07-08 ENCOUNTER — TELEPHONE (OUTPATIENT)
Dept: NEPHROLOGY | Facility: CLINIC | Age: 70
End: 2025-07-08
Payer: MEDICARE

## 2025-07-08 DIAGNOSIS — N18.30 STAGE 3 CHRONIC KIDNEY DISEASE, UNSPECIFIED WHETHER STAGE 3A OR 3B CKD: Primary | ICD-10-CM

## 2025-07-08 LAB — BNP SERPL-MCNC: 3164 PG/ML (ref 0–99)

## 2025-07-12 ENCOUNTER — HOSPITAL ENCOUNTER (INPATIENT)
Facility: HOSPITAL | Age: 70
LOS: 5 days | Discharge: HOME HEALTH | DRG: 291 | End: 2025-07-18
Attending: EMERGENCY MEDICINE | Admitting: STUDENT IN AN ORGANIZED HEALTH CARE EDUCATION/TRAINING PROGRAM
Payer: MEDICARE

## 2025-07-12 DIAGNOSIS — E11.22 TYPE 2 DIABETES MELLITUS WITH STAGE 4 CHRONIC KIDNEY DISEASE, WITH LONG-TERM CURRENT USE OF INSULIN: Primary | ICD-10-CM

## 2025-07-12 DIAGNOSIS — N18.4 TYPE 2 DIABETES MELLITUS WITH STAGE 4 CHRONIC KIDNEY DISEASE, WITH LONG-TERM CURRENT USE OF INSULIN: Primary | ICD-10-CM

## 2025-07-12 DIAGNOSIS — I48.91 A-FIB: ICD-10-CM

## 2025-07-12 DIAGNOSIS — I73.9 PVD (PERIPHERAL VASCULAR DISEASE): ICD-10-CM

## 2025-07-12 DIAGNOSIS — E11.69 HYPERLIPIDEMIA ASSOCIATED WITH TYPE 2 DIABETES MELLITUS: ICD-10-CM

## 2025-07-12 DIAGNOSIS — E78.5 HYPERLIPIDEMIA ASSOCIATED WITH TYPE 2 DIABETES MELLITUS: ICD-10-CM

## 2025-07-12 DIAGNOSIS — I50.9 CHF (CONGESTIVE HEART FAILURE): ICD-10-CM

## 2025-07-12 DIAGNOSIS — R42 DIZZINESS: ICD-10-CM

## 2025-07-12 DIAGNOSIS — I50.33 ACUTE ON CHRONIC DIASTOLIC CONGESTIVE HEART FAILURE: ICD-10-CM

## 2025-07-12 DIAGNOSIS — N28.89 RENAL MASS: ICD-10-CM

## 2025-07-12 DIAGNOSIS — I45.5 SINUS PAUSE: ICD-10-CM

## 2025-07-12 DIAGNOSIS — R07.9 CHEST PAIN: ICD-10-CM

## 2025-07-12 DIAGNOSIS — Z79.4 TYPE 2 DIABETES MELLITUS WITH STAGE 4 CHRONIC KIDNEY DISEASE, WITH LONG-TERM CURRENT USE OF INSULIN: Primary | ICD-10-CM

## 2025-07-12 DIAGNOSIS — Z96.41 INSULIN PUMP IN PLACE: ICD-10-CM

## 2025-07-12 DIAGNOSIS — I45.9 ACQUIRED HEART BLOCK: ICD-10-CM

## 2025-07-12 LAB
ABSOLUTE EOSINOPHIL (OHS): 0.21 K/UL
ABSOLUTE MONOCYTE (OHS): 1.53 K/UL (ref 0.3–1)
ABSOLUTE NEUTROPHIL COUNT (OHS): 3.62 K/UL (ref 1.8–7.7)
ALBUMIN SERPL BCP-MCNC: 3.1 G/DL (ref 3.5–5.2)
ALP SERPL-CCNC: 54 UNIT/L (ref 40–150)
ALT SERPL W/O P-5'-P-CCNC: <5 UNIT/L (ref 10–44)
ANION GAP (OHS): 14 MMOL/L (ref 8–16)
AST SERPL-CCNC: 23 UNIT/L (ref 11–45)
BASOPHILS # BLD AUTO: 0.05 K/UL
BASOPHILS NFR BLD AUTO: 0.7 %
BILIRUB SERPL-MCNC: 0.5 MG/DL (ref 0.1–1)
BNP SERPL-MCNC: 4888 PG/ML (ref 0–99)
BUN SERPL-MCNC: 68 MG/DL (ref 8–23)
CALCIUM SERPL-MCNC: 8.7 MG/DL (ref 8.7–10.5)
CHLORIDE SERPL-SCNC: 101 MMOL/L (ref 95–110)
CO2 SERPL-SCNC: 22 MMOL/L (ref 23–29)
CREAT SERPL-MCNC: 3.9 MG/DL (ref 0.5–1.4)
ERYTHROCYTE [DISTWIDTH] IN BLOOD BY AUTOMATED COUNT: 19.3 % (ref 11.5–14.5)
GFR SERPLBLD CREATININE-BSD FMLA CKD-EPI: 16 ML/MIN/1.73/M2
GLUCOSE SERPL-MCNC: 168 MG/DL (ref 70–110)
HCT VFR BLD AUTO: 30.2 % (ref 40–54)
HGB BLD-MCNC: 8.9 GM/DL (ref 14–18)
IMM GRANULOCYTES # BLD AUTO: 0.02 K/UL (ref 0–0.04)
IMM GRANULOCYTES NFR BLD AUTO: 0.3 % (ref 0–0.5)
LACTATE SERPL-SCNC: 1.9 MMOL/L (ref 0.5–2.2)
LYMPHOCYTES # BLD AUTO: 2.11 K/UL (ref 1–4.8)
MAGNESIUM SERPL-MCNC: 2.2 MG/DL (ref 1.6–2.6)
MCH RBC QN AUTO: 21.7 PG (ref 27–31)
MCHC RBC AUTO-ENTMCNC: 29.5 G/DL (ref 32–36)
MCV RBC AUTO: 74 FL (ref 82–98)
NUCLEATED RBC (/100WBC) (OHS): 0 /100 WBC
PHOSPHATE SERPL-MCNC: 3.3 MG/DL (ref 2.7–4.5)
PLATELET # BLD AUTO: 136 K/UL (ref 150–450)
PMV BLD AUTO: ABNORMAL FL
POTASSIUM SERPL-SCNC: 4.3 MMOL/L (ref 3.5–5.1)
PROT SERPL-MCNC: 7 GM/DL (ref 6–8.4)
RBC # BLD AUTO: 4.11 M/UL (ref 4.6–6.2)
RELATIVE EOSINOPHIL (OHS): 2.8 %
RELATIVE LYMPHOCYTE (OHS): 28 % (ref 18–48)
RELATIVE MONOCYTE (OHS): 20.3 % (ref 4–15)
RELATIVE NEUTROPHIL (OHS): 47.9 % (ref 38–73)
SODIUM SERPL-SCNC: 137 MMOL/L (ref 136–145)
TROPONIN I SERPL HS-MCNC: 205 NG/L
TSH SERPL-ACNC: 1.68 UIU/ML (ref 0.4–4)
WBC # BLD AUTO: 7.54 K/UL (ref 3.9–12.7)

## 2025-07-12 PROCEDURE — 99291 CRITICAL CARE FIRST HOUR: CPT | Mod: HCNC

## 2025-07-12 PROCEDURE — 84484 ASSAY OF TROPONIN QUANT: CPT | Mod: HCNC | Performed by: EMERGENCY MEDICINE

## 2025-07-12 PROCEDURE — 96376 TX/PRO/DX INJ SAME DRUG ADON: CPT | Mod: HCNC

## 2025-07-12 PROCEDURE — 63600175 PHARM REV CODE 636 W HCPCS: Mod: HCNC | Performed by: STUDENT IN AN ORGANIZED HEALTH CARE EDUCATION/TRAINING PROGRAM

## 2025-07-12 PROCEDURE — G0378 HOSPITAL OBSERVATION PER HR: HCPCS | Mod: HCNC

## 2025-07-12 PROCEDURE — 93005 ELECTROCARDIOGRAM TRACING: CPT | Mod: HCNC

## 2025-07-12 PROCEDURE — 84100 ASSAY OF PHOSPHORUS: CPT | Mod: HCNC | Performed by: EMERGENCY MEDICINE

## 2025-07-12 PROCEDURE — 83735 ASSAY OF MAGNESIUM: CPT | Mod: HCNC | Performed by: EMERGENCY MEDICINE

## 2025-07-12 PROCEDURE — 84443 ASSAY THYROID STIM HORMONE: CPT | Mod: HCNC | Performed by: EMERGENCY MEDICINE

## 2025-07-12 PROCEDURE — 80053 COMPREHEN METABOLIC PANEL: CPT | Mod: HCNC | Performed by: EMERGENCY MEDICINE

## 2025-07-12 PROCEDURE — 83880 ASSAY OF NATRIURETIC PEPTIDE: CPT | Mod: HCNC | Performed by: EMERGENCY MEDICINE

## 2025-07-12 PROCEDURE — 93010 ELECTROCARDIOGRAM REPORT: CPT | Mod: HCNC,,, | Performed by: INTERNAL MEDICINE

## 2025-07-12 PROCEDURE — 83605 ASSAY OF LACTIC ACID: CPT | Mod: HCNC | Performed by: EMERGENCY MEDICINE

## 2025-07-12 PROCEDURE — 63600175 PHARM REV CODE 636 W HCPCS: Mod: HCNC | Performed by: EMERGENCY MEDICINE

## 2025-07-12 PROCEDURE — 96375 TX/PRO/DX INJ NEW DRUG ADDON: CPT | Mod: HCNC

## 2025-07-12 PROCEDURE — 81003 URINALYSIS AUTO W/O SCOPE: CPT | Mod: HCNC | Performed by: EMERGENCY MEDICINE

## 2025-07-12 PROCEDURE — 85025 COMPLETE CBC W/AUTO DIFF WBC: CPT | Mod: HCNC | Performed by: EMERGENCY MEDICINE

## 2025-07-12 PROCEDURE — 93010 ELECTROCARDIOGRAM REPORT: CPT | Mod: HCNC,76,, | Performed by: INTERNAL MEDICINE

## 2025-07-12 PROCEDURE — 83935 ASSAY OF URINE OSMOLALITY: CPT | Mod: HCNC

## 2025-07-12 PROCEDURE — 96374 THER/PROPH/DIAG INJ IV PUSH: CPT | Mod: HCNC

## 2025-07-12 RX ORDER — FUROSEMIDE 10 MG/ML
80 INJECTION INTRAMUSCULAR; INTRAVENOUS
Status: COMPLETED | OUTPATIENT
Start: 2025-07-12 | End: 2025-07-12

## 2025-07-12 RX ORDER — HYDRALAZINE HYDROCHLORIDE 20 MG/ML
20 INJECTION INTRAMUSCULAR; INTRAVENOUS
Status: COMPLETED | OUTPATIENT
Start: 2025-07-12 | End: 2025-07-12

## 2025-07-12 RX ORDER — HYDRALAZINE HYDROCHLORIDE 20 MG/ML
10 INJECTION INTRAMUSCULAR; INTRAVENOUS
Status: COMPLETED | OUTPATIENT
Start: 2025-07-12 | End: 2025-07-12

## 2025-07-12 RX ADMIN — HYDRALAZINE HYDROCHLORIDE 10 MG: 20 INJECTION, SOLUTION INTRAMUSCULAR; INTRAVENOUS at 09:07

## 2025-07-12 RX ADMIN — HYDRALAZINE HYDROCHLORIDE 20 MG: 20 INJECTION, SOLUTION INTRAMUSCULAR; INTRAVENOUS at 10:07

## 2025-07-12 RX ADMIN — FUROSEMIDE 80 MG: 10 INJECTION, SOLUTION INTRAVENOUS at 10:07

## 2025-07-13 PROBLEM — N18.4 HYPERTENSION ASSOCIATED WITH STAGE 4 CHRONIC KIDNEY DISEASE DUE TO TYPE 2 DIABETES MELLITUS: Status: ACTIVE | Noted: 2025-07-13

## 2025-07-13 PROBLEM — I16.1 HYPERTENSIVE EMERGENCY: Status: ACTIVE | Noted: 2025-07-13

## 2025-07-13 PROBLEM — N18.4 ACUTE KIDNEY INJURY SUPERIMPOSED ON STAGE 4 CHRONIC KIDNEY DISEASE: Status: ACTIVE | Noted: 2025-06-03

## 2025-07-13 PROBLEM — E11.22 HYPERTENSION ASSOCIATED WITH STAGE 4 CHRONIC KIDNEY DISEASE DUE TO TYPE 2 DIABETES MELLITUS: Status: ACTIVE | Noted: 2025-07-13

## 2025-07-13 PROBLEM — R50.9 FEBRILE ILLNESS, ACUTE: Status: ACTIVE | Noted: 2025-07-13

## 2025-07-13 PROBLEM — I12.9 HYPERTENSION ASSOCIATED WITH STAGE 4 CHRONIC KIDNEY DISEASE DUE TO TYPE 2 DIABETES MELLITUS: Status: ACTIVE | Noted: 2025-07-13

## 2025-07-13 LAB
ABSOLUTE EOSINOPHIL (OHS): 0.15 K/UL
ABSOLUTE MONOCYTE (OHS): 1.33 K/UL (ref 0.3–1)
ABSOLUTE NEUTROPHIL COUNT (OHS): 3.38 K/UL (ref 1.8–7.7)
ANION GAP (OHS): 12 MMOL/L (ref 8–16)
AV LVOT MEAN GRADIENT: 4 MMHG
AV LVOT PEAK GRADIENT: 7 MMHG
AV REGURGITATION PRESSURE HALF TIME: 431 MS
B PERT DNA NPH QL NAA+PROBE: NOT DETECTED
BACTERIA #/AREA URNS AUTO: NORMAL /HPF
BASOPHILS # BLD AUTO: 0.04 K/UL
BASOPHILS NFR BLD AUTO: 0.6 %
BILIRUB UR QL STRIP.AUTO: NEGATIVE
BSA FOR ECHO PROCEDURE: 2.21 M2
BUN SERPL-MCNC: 66 MG/DL (ref 8–23)
C PNEUM DNA LOWER RESP QL NAA+NON-PROBE: NOT DETECTED
CALCIUM SERPL-MCNC: 8.2 MG/DL (ref 8.7–10.5)
CHLORIDE SERPL-SCNC: 101 MMOL/L (ref 95–110)
CLARITY UR: CLEAR
CO2 SERPL-SCNC: 22 MMOL/L (ref 23–29)
COLOR UR AUTO: COLORLESS
CREAT SERPL-MCNC: 3.9 MG/DL (ref 0.5–1.4)
CREAT UR-MCNC: 43 MG/DL (ref 23–375)
CV ECHO LV RWT: 0.47 CM
DOP CALC LVOT AREA: 3.1 CM2
DOP CALC LVOT DIAMETER: 2 CM
DOP CALC LVOT PEAK VEL: 1.3 M/S
DOP CALC LVOT STROKE VOLUME: 78.5 CM3
DOP CALCLVOT PEAK VEL VTI: 25 CM
E WAVE DECELERATION TIME: 164 MS
E/A RATIO: 2.97
E/E' RATIO: 13 M/S
EAG (OHS): 140 MG/DL (ref 68–131)
ECHO EF ESTIMATED: 55 %
ECHO LV POSTERIOR WALL: 1.2 CM (ref 0.6–1.1)
ERYTHROCYTE [DISTWIDTH] IN BLOOD BY AUTOMATED COUNT: 19.6 % (ref 11.5–14.5)
FLUAV AG UPPER RESP QL IA.RAPID: NEGATIVE
FLUAV RNA NPH QL NAA+NON-PROBE: NOT DETECTED
FLUBV AG UPPER RESP QL IA.RAPID: NEGATIVE
FLUBV RNA NPH QL NAA+NON-PROBE: NOT DETECTED
FRACTIONAL SHORTENING: 27.5 % (ref 28–44)
GFR SERPLBLD CREATININE-BSD FMLA CKD-EPI: 16 ML/MIN/1.73/M2
GLUCOSE SERPL-MCNC: 152 MG/DL (ref 70–110)
GLUCOSE UR QL STRIP: ABNORMAL
HADV DNA NPH QL NAA+NON-PROBE: NOT DETECTED
HBA1C MFR BLD: 6.5 % (ref 4–5.6)
HCOV 229E RNA NPH QL NAA+NON-PROBE: NOT DETECTED
HCOV HKU1 RNA NPH QL NAA+NON-PROBE: NOT DETECTED
HCOV NL63 RNA NPH QL NAA+NON-PROBE: NOT DETECTED
HCOV OC43 RNA NPH QL NAA+NON-PROBE: NOT DETECTED
HCT VFR BLD AUTO: 30.4 % (ref 40–54)
HGB BLD-MCNC: 8.5 GM/DL (ref 14–18)
HGB UR QL STRIP: NEGATIVE
HMPV RNA LOWER RESP QL NAA+NON-PROBE: NOT DETECTED
HMPV RNA NPH QL NAA+NON-PROBE: NOT DETECTED
HOLD SPECIMEN: NORMAL
HPIV1 RNA NPH QL NAA+NON-PROBE: NOT DETECTED
HPIV2 RNA NPH QL NAA+NON-PROBE: NOT DETECTED
HPIV3 RNA NPH QL NAA+NON-PROBE: NOT DETECTED
HPIV4 RNA NPH QL NAA+NON-PROBE: NOT DETECTED
HYALINE CASTS UR QL AUTO: 0 /LPF (ref 0–1)
IMM GRANULOCYTES # BLD AUTO: 0.03 K/UL (ref 0–0.04)
IMM GRANULOCYTES NFR BLD AUTO: 0.5 % (ref 0–0.5)
INTERVENTRICULAR SEPTUM: 1.2 CM (ref 0.6–1.1)
IVRT: 88 MS
KETONES UR QL STRIP: NEGATIVE
LEFT ATRIUM SIZE: 4.2 CM
LEFT INTERNAL DIMENSION IN SYSTOLE: 3.7 CM (ref 2.1–4)
LEFT VENTRICLE DIASTOLIC VOLUME INDEX: 56.36 ML/M2
LEFT VENTRICLE DIASTOLIC VOLUME: 124 ML
LEFT VENTRICLE MASS INDEX: 109.7 G/M2
LEFT VENTRICLE SYSTOLIC VOLUME INDEX: 25.5 ML/M2
LEFT VENTRICLE SYSTOLIC VOLUME: 56 ML
LEFT VENTRICULAR INTERNAL DIMENSION IN DIASTOLE: 5.1 CM (ref 3.5–6)
LEFT VENTRICULAR MASS: 241.2 G
LEUKOCYTE ESTERASE UR QL STRIP: NEGATIVE
LV LATERAL E/E' RATIO: 8.1 M/S
LV SEPTAL E/E' RATIO: 29.7 M/S
LYMPHOCYTES # BLD AUTO: 1.43 K/UL (ref 1–4.8)
MAGNESIUM SERPL-MCNC: 1.9 MG/DL (ref 1.6–2.6)
MAGNESIUM SERPL-MCNC: 2.2 MG/DL (ref 1.6–2.6)
MCH RBC QN AUTO: 21.4 PG (ref 27–31)
MCHC RBC AUTO-ENTMCNC: 28 G/DL (ref 32–36)
MCV RBC AUTO: 77 FL (ref 82–98)
MICROSCOPIC COMMENT: NORMAL
MV PEAK A VEL: 0.3 M/S
MV PEAK E VEL: 0.89 M/S
NITRITE UR QL STRIP: NEGATIVE
NUCLEATED RBC (/100WBC) (OHS): 0 /100 WBC
OHS CV RV/LV RATIO: 0.69 CM
OHS QRS DURATION: 112 MS
OHS QRS DURATION: 114 MS
OHS QRS DURATION: 118 MS
OHS QTC CALCULATION: 409 MS
OHS QTC CALCULATION: 426 MS
OHS QTC CALCULATION: 435 MS
OSMOLALITY SERPL: 313 MOSM/KG (ref 280–300)
OSMOLALITY UR: 323 MOSM/KG (ref 50–1200)
PH UR STRIP: 8 [PH]
PHOSPHATE SERPL-MCNC: 3.1 MG/DL (ref 2.7–4.5)
PHOSPHATE SERPL-MCNC: 3.8 MG/DL (ref 2.7–4.5)
PISA TR MAX VEL: 4 M/S
PLATELET # BLD AUTO: 118 K/UL (ref 150–450)
PMV BLD AUTO: ABNORMAL FL
POCT GLUCOSE: 173 MG/DL (ref 70–110)
POCT GLUCOSE: 253 MG/DL (ref 70–110)
POCT GLUCOSE: 272 MG/DL (ref 70–110)
POCT GLUCOSE: 278 MG/DL (ref 70–110)
POCT GLUCOSE: 279 MG/DL (ref 70–110)
POTASSIUM SERPL-SCNC: 3.3 MMOL/L (ref 3.5–5.1)
POTASSIUM SERPL-SCNC: 4.1 MMOL/L (ref 3.5–5.1)
PROCALCITONIN SERPL-MCNC: 0.17 NG/ML
PROT UR QL STRIP: ABNORMAL
RA PRESSURE ESTIMATED: 3 MMHG
RBC # BLD AUTO: 3.97 M/UL (ref 4.6–6.2)
RBC #/AREA URNS AUTO: 1 /HPF (ref 0–4)
RELATIVE EOSINOPHIL (OHS): 2.4 %
RELATIVE LYMPHOCYTE (OHS): 22.5 % (ref 18–48)
RELATIVE MONOCYTE (OHS): 20.9 % (ref 4–15)
RELATIVE NEUTROPHIL (OHS): 53.1 % (ref 38–73)
RIGHT VENTRICLE DIASTOLIC BASEL DIMENSION: 3.5 CM
RSV A 5' UTR RNA NPH QL NAA+PROBE: NEGATIVE
RSV RNA NPH QL NAA+NON-PROBE: NOT DETECTED
RSV RNA NPH QL NAA+NON-PROBE: NOT DETECTED
RV TB RVSP: 7 MMHG
RV TISSUE DOPPLER FREE WALL SYSTOLIC VELOCITY 1 (APICAL 4 CHAMBER VIEW): 14.17 CM/S
RV+EV RNA NPH QL NAA+NON-PROBE: NOT DETECTED
SARS-COV-2 RNA RESP QL NAA+PROBE: NEGATIVE
SARS-COV-2 RNA RESP QL NAA+PROBE: NOT DETECTED
SODIUM SERPL-SCNC: 135 MMOL/L (ref 136–145)
SODIUM UR-SCNC: 78 MMOL/L (ref 20–250)
SP GR UR STRIP: 1.01
SPECIMEN SOURCE: NORMAL
SQUAMOUS #/AREA URNS AUTO: <1 /HPF
TACROLIMUS BLD-MCNC: 3.3 NG/ML (ref 5–15)
TDI LATERAL: 0.11 M/S
TDI SEPTAL: 0.03 M/S
TDI: 0.07 M/S
TRICUSPID ANNULAR PLANE SYSTOLIC EXCURSION: 1.5 CM
TROPONIN I SERPL HS-MCNC: 220 NG/L
TROPONIN I SERPL HS-MCNC: 224 NG/L
TV PEAK GRADIENT: 63 MMHG
TV REST PULMONARY ARTERY PRESSURE: 67 MMHG
UROBILINOGEN UR STRIP-ACNC: NEGATIVE EU/DL
UUN UR-MCNC: 303 MG/DL (ref 140–1050)
WBC # BLD AUTO: 6.36 K/UL (ref 3.9–12.7)
WBC #/AREA URNS AUTO: <1 /HPF (ref 0–5)
YEAST UR QL AUTO: NORMAL /HPF
Z-SCORE OF LEFT VENTRICULAR DIMENSION IN END DIASTOLE: -3.87
Z-SCORE OF LEFT VENTRICULAR DIMENSION IN END SYSTOLE: -1.66

## 2025-07-13 PROCEDURE — 99900035 HC TECH TIME PER 15 MIN (STAT): Mod: HCNC

## 2025-07-13 PROCEDURE — 83930 ASSAY OF BLOOD OSMOLALITY: CPT | Mod: HCNC

## 2025-07-13 PROCEDURE — 96366 THER/PROPH/DIAG IV INF ADDON: CPT | Mod: HCNC

## 2025-07-13 PROCEDURE — 94799 UNLISTED PULMONARY SVC/PX: CPT | Mod: HCNC

## 2025-07-13 PROCEDURE — 93010 ELECTROCARDIOGRAM REPORT: CPT | Mod: HCNC,,, | Performed by: INTERNAL MEDICINE

## 2025-07-13 PROCEDURE — 99223 1ST HOSP IP/OBS HIGH 75: CPT | Mod: HCNC,,, | Performed by: INTERNAL MEDICINE

## 2025-07-13 PROCEDURE — 84132 ASSAY OF SERUM POTASSIUM: CPT | Mod: HCNC | Performed by: PHYSICIAN ASSISTANT

## 2025-07-13 PROCEDURE — 25000003 PHARM REV CODE 250: Mod: HCNC | Performed by: STUDENT IN AN ORGANIZED HEALTH CARE EDUCATION/TRAINING PROGRAM

## 2025-07-13 PROCEDURE — 82962 GLUCOSE BLOOD TEST: CPT | Mod: HCNC

## 2025-07-13 PROCEDURE — 87637 SARSCOV2&INF A&B&RSV AMP PRB: CPT | Mod: HCNC

## 2025-07-13 PROCEDURE — 80197 ASSAY OF TACROLIMUS: CPT | Mod: HCNC

## 2025-07-13 PROCEDURE — 63600175 PHARM REV CODE 636 W HCPCS: Mod: HCNC

## 2025-07-13 PROCEDURE — 82570 ASSAY OF URINE CREATININE: CPT | Mod: HCNC

## 2025-07-13 PROCEDURE — 84100 ASSAY OF PHOSPHORUS: CPT | Mod: HCNC | Performed by: PHYSICIAN ASSISTANT

## 2025-07-13 PROCEDURE — 93005 ELECTROCARDIOGRAM TRACING: CPT | Mod: HCNC

## 2025-07-13 PROCEDURE — 11000001 HC ACUTE MED/SURG PRIVATE ROOM: Mod: HCNC

## 2025-07-13 PROCEDURE — 96368 THER/DIAG CONCURRENT INF: CPT | Mod: HCNC

## 2025-07-13 PROCEDURE — 84145 PROCALCITONIN (PCT): CPT | Mod: HCNC

## 2025-07-13 PROCEDURE — 84484 ASSAY OF TROPONIN QUANT: CPT | Mod: 91,HCNC

## 2025-07-13 PROCEDURE — 87389 HIV-1 AG W/HIV-1&-2 AB AG IA: CPT | Mod: HCNC

## 2025-07-13 PROCEDURE — 0202U NFCT DS 22 TRGT SARS-COV-2: CPT | Mod: HCNC

## 2025-07-13 PROCEDURE — 96375 TX/PRO/DX INJ NEW DRUG ADDON: CPT | Mod: HCNC

## 2025-07-13 PROCEDURE — 84540 ASSAY OF URINE/UREA-N: CPT | Mod: HCNC

## 2025-07-13 PROCEDURE — 25000003 PHARM REV CODE 250: Mod: HCNC

## 2025-07-13 PROCEDURE — 85025 COMPLETE CBC W/AUTO DIFF WBC: CPT | Mod: HCNC

## 2025-07-13 PROCEDURE — 25000003 PHARM REV CODE 250: Mod: HCNC | Performed by: NURSE PRACTITIONER

## 2025-07-13 PROCEDURE — 80048 BASIC METABOLIC PNL TOTAL CA: CPT | Mod: HCNC

## 2025-07-13 PROCEDURE — 63600175 PHARM REV CODE 636 W HCPCS: Mod: HCNC | Performed by: NURSE PRACTITIONER

## 2025-07-13 PROCEDURE — 36415 COLL VENOUS BLD VENIPUNCTURE: CPT | Mod: HCNC | Performed by: PHYSICIAN ASSISTANT

## 2025-07-13 PROCEDURE — 83735 ASSAY OF MAGNESIUM: CPT | Mod: HCNC

## 2025-07-13 PROCEDURE — 96365 THER/PROPH/DIAG IV INF INIT: CPT | Mod: HCNC

## 2025-07-13 PROCEDURE — 83735 ASSAY OF MAGNESIUM: CPT | Mod: HCNC | Performed by: PHYSICIAN ASSISTANT

## 2025-07-13 PROCEDURE — 94761 N-INVAS EAR/PLS OXIMETRY MLT: CPT | Mod: HCNC

## 2025-07-13 PROCEDURE — 99222 1ST HOSP IP/OBS MODERATE 55: CPT | Mod: HCNC,,, | Performed by: NURSE PRACTITIONER

## 2025-07-13 PROCEDURE — 84300 ASSAY OF URINE SODIUM: CPT | Mod: HCNC

## 2025-07-13 PROCEDURE — 83036 HEMOGLOBIN GLYCOSYLATED A1C: CPT | Mod: HCNC

## 2025-07-13 PROCEDURE — 84100 ASSAY OF PHOSPHORUS: CPT | Mod: HCNC

## 2025-07-13 PROCEDURE — 87040 BLOOD CULTURE FOR BACTERIA: CPT | Mod: 91,HCNC

## 2025-07-13 RX ORDER — POTASSIUM CHLORIDE 20 MEQ/1
40 TABLET, EXTENDED RELEASE ORAL ONCE
Status: COMPLETED | OUTPATIENT
Start: 2025-07-13 | End: 2025-07-13

## 2025-07-13 RX ORDER — LANOLIN ALCOHOL/MO/W.PET/CERES
1 CREAM (GRAM) TOPICAL DAILY
Status: DISCONTINUED | OUTPATIENT
Start: 2025-07-13 | End: 2025-07-18 | Stop reason: HOSPADM

## 2025-07-13 RX ORDER — MECLIZINE HYDROCHLORIDE 25 MG/1
25 TABLET ORAL 3 TIMES DAILY PRN
Status: DISCONTINUED | OUTPATIENT
Start: 2025-07-13 | End: 2025-07-18 | Stop reason: HOSPADM

## 2025-07-13 RX ORDER — ACETAMINOPHEN 325 MG/1
650 TABLET ORAL EVERY 8 HOURS PRN
Status: DISCONTINUED | OUTPATIENT
Start: 2025-07-13 | End: 2025-07-18 | Stop reason: HOSPADM

## 2025-07-13 RX ORDER — INSULIN ASPART 100 [IU]/ML
0-5 INJECTION, SOLUTION INTRAVENOUS; SUBCUTANEOUS EVERY 4 HOURS PRN
Status: DISCONTINUED | OUTPATIENT
Start: 2025-07-13 | End: 2025-07-15

## 2025-07-13 RX ORDER — ONDANSETRON 8 MG/1
8 TABLET, ORALLY DISINTEGRATING ORAL EVERY 8 HOURS PRN
Status: DISCONTINUED | OUTPATIENT
Start: 2025-07-13 | End: 2025-07-18 | Stop reason: HOSPADM

## 2025-07-13 RX ORDER — FUROSEMIDE 10 MG/ML
60 INJECTION INTRAMUSCULAR; INTRAVENOUS 2 TIMES DAILY
Status: DISCONTINUED | OUTPATIENT
Start: 2025-07-13 | End: 2025-07-15

## 2025-07-13 RX ORDER — METRONIDAZOLE 500 MG/100ML
500 INJECTION, SOLUTION INTRAVENOUS
Status: DISCONTINUED | OUTPATIENT
Start: 2025-07-13 | End: 2025-07-14

## 2025-07-13 RX ORDER — CEFTRIAXONE 1 G/1
1 INJECTION, POWDER, FOR SOLUTION INTRAMUSCULAR; INTRAVENOUS
Status: DISCONTINUED | OUTPATIENT
Start: 2025-07-13 | End: 2025-07-14

## 2025-07-13 RX ORDER — METOPROLOL TARTRATE 50 MG/1
50 TABLET ORAL 2 TIMES DAILY
Status: DISCONTINUED | OUTPATIENT
Start: 2025-07-13 | End: 2025-07-13

## 2025-07-13 RX ORDER — IBUPROFEN 200 MG
24 TABLET ORAL
Status: DISCONTINUED | OUTPATIENT
Start: 2025-07-13 | End: 2025-07-13

## 2025-07-13 RX ORDER — PREDNISONE 5 MG/1
5 TABLET ORAL DAILY
Status: DISCONTINUED | OUTPATIENT
Start: 2025-07-13 | End: 2025-07-18 | Stop reason: HOSPADM

## 2025-07-13 RX ORDER — IPRATROPIUM BROMIDE AND ALBUTEROL SULFATE 2.5; .5 MG/3ML; MG/3ML
3 SOLUTION RESPIRATORY (INHALATION) EVERY 6 HOURS PRN
Status: DISCONTINUED | OUTPATIENT
Start: 2025-07-13 | End: 2025-07-18 | Stop reason: HOSPADM

## 2025-07-13 RX ORDER — IBUPROFEN 200 MG
24 TABLET ORAL
Status: DISCONTINUED | OUTPATIENT
Start: 2025-07-13 | End: 2025-07-16 | Stop reason: SDUPTHER

## 2025-07-13 RX ORDER — SODIUM CHLORIDE 0.9 % (FLUSH) 0.9 %
10 SYRINGE (ML) INJECTION EVERY 12 HOURS PRN
Status: DISCONTINUED | OUTPATIENT
Start: 2025-07-13 | End: 2025-07-18 | Stop reason: HOSPADM

## 2025-07-13 RX ORDER — FAMOTIDINE 20 MG/1
20 TABLET, FILM COATED ORAL DAILY PRN
Status: DISCONTINUED | OUTPATIENT
Start: 2025-07-13 | End: 2025-07-18 | Stop reason: HOSPADM

## 2025-07-13 RX ORDER — ALUMINUM HYDROXIDE, MAGNESIUM HYDROXIDE, AND SIMETHICONE 1200; 120; 1200 MG/30ML; MG/30ML; MG/30ML
30 SUSPENSION ORAL 4 TIMES DAILY PRN
Status: DISCONTINUED | OUTPATIENT
Start: 2025-07-13 | End: 2025-07-18 | Stop reason: HOSPADM

## 2025-07-13 RX ORDER — IBUPROFEN 200 MG
16 TABLET ORAL
Status: DISCONTINUED | OUTPATIENT
Start: 2025-07-13 | End: 2025-07-13

## 2025-07-13 RX ORDER — ACETAMINOPHEN 500 MG
500 TABLET ORAL ONCE
Status: COMPLETED | OUTPATIENT
Start: 2025-07-13 | End: 2025-07-13

## 2025-07-13 RX ORDER — CLONIDINE HYDROCHLORIDE 0.3 MG/1
0.3 TABLET ORAL ONCE
Status: COMPLETED | OUTPATIENT
Start: 2025-07-13 | End: 2025-07-13

## 2025-07-13 RX ORDER — TALC
6 POWDER (GRAM) TOPICAL NIGHTLY PRN
Status: DISCONTINUED | OUTPATIENT
Start: 2025-07-13 | End: 2025-07-18 | Stop reason: HOSPADM

## 2025-07-13 RX ORDER — SODIUM CHLORIDE 0.9 % (FLUSH) 0.9 %
10 SYRINGE (ML) INJECTION
Status: DISCONTINUED | OUTPATIENT
Start: 2025-07-13 | End: 2025-07-18 | Stop reason: HOSPADM

## 2025-07-13 RX ORDER — CARVEDILOL 25 MG/1
25 TABLET ORAL 2 TIMES DAILY
Status: DISCONTINUED | OUTPATIENT
Start: 2025-07-13 | End: 2025-07-14

## 2025-07-13 RX ORDER — DOXAZOSIN 2 MG/1
8 TABLET ORAL DAILY
Status: DISCONTINUED | OUTPATIENT
Start: 2025-07-13 | End: 2025-07-13

## 2025-07-13 RX ORDER — ATORVASTATIN CALCIUM 40 MG/1
80 TABLET, FILM COATED ORAL DAILY
Status: DISCONTINUED | OUTPATIENT
Start: 2025-07-13 | End: 2025-07-18 | Stop reason: HOSPADM

## 2025-07-13 RX ORDER — NALOXONE HCL 0.4 MG/ML
0.02 VIAL (ML) INJECTION
Status: DISCONTINUED | OUTPATIENT
Start: 2025-07-13 | End: 2025-07-18 | Stop reason: HOSPADM

## 2025-07-13 RX ORDER — CLONIDINE HYDROCHLORIDE 0.3 MG/1
0.3 TABLET ORAL ONCE
Status: DISCONTINUED | OUTPATIENT
Start: 2025-07-13 | End: 2025-07-13

## 2025-07-13 RX ORDER — DOXAZOSIN 8 MG/1
8 TABLET ORAL DAILY
Status: DISCONTINUED | OUTPATIENT
Start: 2025-07-13 | End: 2025-07-14

## 2025-07-13 RX ORDER — NAPROXEN SODIUM 220 MG/1
81 TABLET, FILM COATED ORAL DAILY
Status: DISCONTINUED | OUTPATIENT
Start: 2025-07-13 | End: 2025-07-18 | Stop reason: HOSPADM

## 2025-07-13 RX ORDER — INSULIN ASPART 100 [IU]/ML
0-5 INJECTION, SOLUTION INTRAVENOUS; SUBCUTANEOUS
Status: DISCONTINUED | OUTPATIENT
Start: 2025-07-13 | End: 2025-07-13

## 2025-07-13 RX ORDER — IBUPROFEN 200 MG
16 TABLET ORAL
Status: DISCONTINUED | OUTPATIENT
Start: 2025-07-13 | End: 2025-07-16 | Stop reason: SDUPTHER

## 2025-07-13 RX ORDER — INSULIN ASPART 100 [IU]/ML
2 INJECTION, SOLUTION INTRAVENOUS; SUBCUTANEOUS
Status: DISCONTINUED | OUTPATIENT
Start: 2025-07-13 | End: 2025-07-14

## 2025-07-13 RX ORDER — ACETAMINOPHEN 325 MG/1
650 TABLET ORAL EVERY 4 HOURS PRN
Status: DISCONTINUED | OUTPATIENT
Start: 2025-07-13 | End: 2025-07-18 | Stop reason: HOSPADM

## 2025-07-13 RX ORDER — HYDRALAZINE HYDROCHLORIDE 25 MG/1
50 TABLET, FILM COATED ORAL ONCE
Status: COMPLETED | OUTPATIENT
Start: 2025-07-13 | End: 2025-07-13

## 2025-07-13 RX ORDER — GLUCAGON 1 MG
1 KIT INJECTION
Status: DISCONTINUED | OUTPATIENT
Start: 2025-07-13 | End: 2025-07-13

## 2025-07-13 RX ORDER — POLYETHYLENE GLYCOL 3350 17 G/17G
17 POWDER, FOR SOLUTION ORAL DAILY
Status: DISCONTINUED | OUTPATIENT
Start: 2025-07-13 | End: 2025-07-18 | Stop reason: HOSPADM

## 2025-07-13 RX ORDER — METOPROLOL TARTRATE 25 MG/1
25 TABLET, FILM COATED ORAL 2 TIMES DAILY
Status: DISCONTINUED | OUTPATIENT
Start: 2025-07-13 | End: 2025-07-13

## 2025-07-13 RX ORDER — TACROLIMUS 1 MG/1
1 CAPSULE ORAL 2 TIMES DAILY
Status: DISCONTINUED | OUTPATIENT
Start: 2025-07-13 | End: 2025-07-18 | Stop reason: HOSPADM

## 2025-07-13 RX ORDER — VANCOMYCIN 2 GRAM/500 ML IN 0.9 % SODIUM CHLORIDE INTRAVENOUS
20 ONCE
Status: COMPLETED | OUTPATIENT
Start: 2025-07-13 | End: 2025-07-13

## 2025-07-13 RX ORDER — GLUCAGON 1 MG
1 KIT INJECTION
Status: DISCONTINUED | OUTPATIENT
Start: 2025-07-13 | End: 2025-07-16 | Stop reason: SDUPTHER

## 2025-07-13 RX ADMIN — SODIUM CHLORIDE 2000 MG: 9 INJECTION, SOLUTION INTRAVENOUS at 03:07

## 2025-07-13 RX ADMIN — CLONIDINE HYDROCHLORIDE 0.3 MG: 0.3 TABLET ORAL at 02:07

## 2025-07-13 RX ADMIN — HYDRALAZINE HYDROCHLORIDE 50 MG: 25 TABLET ORAL at 02:07

## 2025-07-13 RX ADMIN — METOPROLOL TARTRATE 50 MG: 25 TABLET, FILM COATED ORAL at 09:07

## 2025-07-13 RX ADMIN — ACETAMINOPHEN 650 MG: 325 TABLET ORAL at 02:07

## 2025-07-13 RX ADMIN — ISOSORBIDE DINITRATE: 20 TABLET ORAL at 02:07

## 2025-07-13 RX ADMIN — ISOSORBIDE DINITRATE: 20 TABLET ORAL at 09:07

## 2025-07-13 RX ADMIN — METRONIDAZOLE 500 MG: 500 INJECTION, SOLUTION INTRAVENOUS at 03:07

## 2025-07-13 RX ADMIN — FUROSEMIDE 60 MG: 10 INJECTION, SOLUTION INTRAMUSCULAR; INTRAVENOUS at 09:07

## 2025-07-13 RX ADMIN — POTASSIUM CHLORIDE 40 MEQ: 1500 TABLET, EXTENDED RELEASE ORAL at 04:07

## 2025-07-13 RX ADMIN — METRONIDAZOLE 500 MG: 500 INJECTION, SOLUTION INTRAVENOUS at 09:07

## 2025-07-13 RX ADMIN — CEFTRIAXONE 1 G: 1 INJECTION, POWDER, FOR SOLUTION INTRAMUSCULAR; INTRAVENOUS at 03:07

## 2025-07-13 RX ADMIN — TACROLIMUS 1 MG: 1 CAPSULE ORAL at 08:07

## 2025-07-13 RX ADMIN — ACETAMINOPHEN 500 MG: 500 TABLET ORAL at 03:07

## 2025-07-13 RX ADMIN — FUROSEMIDE 60 MG: 10 INJECTION, SOLUTION INTRAMUSCULAR; INTRAVENOUS at 05:07

## 2025-07-13 RX ADMIN — FERROUS SULFATE TAB EC 325 MG (65 MG FE EQUIVALENT) 1 EACH: 325 (65 FE) TABLET DELAYED RESPONSE at 09:07

## 2025-07-13 RX ADMIN — INSULIN ASPART 2 UNITS: 100 INJECTION, SOLUTION INTRAVENOUS; SUBCUTANEOUS at 05:07

## 2025-07-13 RX ADMIN — RIVAROXABAN 15 MG: 15 TABLET, FILM COATED ORAL at 05:07

## 2025-07-13 RX ADMIN — METRONIDAZOLE 500 MG: 500 INJECTION, SOLUTION INTRAVENOUS at 01:07

## 2025-07-13 RX ADMIN — DOXAZOSIN 8 MG: 2 TABLET ORAL at 09:07

## 2025-07-13 RX ADMIN — PREDNISONE 5 MG: 5 TABLET ORAL at 09:07

## 2025-07-13 RX ADMIN — TACROLIMUS 1 MG: 1 CAPSULE ORAL at 05:07

## 2025-07-13 RX ADMIN — ASPIRIN 81 MG CHEWABLE TABLET 81 MG: 81 TABLET CHEWABLE at 09:07

## 2025-07-13 RX ADMIN — INSULIN HUMAN 0.4 UNITS/HR: 1 INJECTION, SOLUTION INTRAVENOUS at 02:07

## 2025-07-13 RX ADMIN — INSULIN ASPART 3 UNITS: 100 INJECTION, SOLUTION INTRAVENOUS; SUBCUTANEOUS at 09:07

## 2025-07-13 RX ADMIN — ATORVASTATIN CALCIUM 80 MG: 40 TABLET, FILM COATED ORAL at 09:07

## 2025-07-13 NOTE — CONSULTS
Ravi Zamorano is a 69 y.o. male for whom kidney transplant medicine consult has been requested to evaluate and give opinion.     HPI: patient is s/p a kidney transplant in 2016 with a baseline creatinine between 2.6 to 3.1. admitted with fever, dizziness, and heart failure exacerbation in addition to elevated BP . His current creatinine is 3.9. patient states feeling weak, lack of energy for the last 3 to 4 days. No nausea vomit or diarrhea. Denied high salt diet, but acknowledged fluid retention and lower extremity edema.tacro, CKD 4, HFpEF, T2DM with insulin pump use, afib on xarelto and AICD. Chest x Ry showed cardiomegaly with Insterstitial edema. He had serial allosure, last one 8 months ago non consistent with rejection, class II DSA detected in 11/2024 work up for ever in place. Kidney US showed normal wave form and Mildly elevated renal resistive indices, no evidence of HAMZAH.       PAST MEDICAL HISTORY:  He  has a past medical history of Anxiety (07/29/2014), Arthritis, atrial fibrillation, Bilateral diabetic retinopathy (2017), Cardioembolic stroke (12/07/2024), CKD (chronic kidney disease), Colon polyps (2014), Depression - situational (07/29/2014), Diabetes type 2 (2000), Encounter for blood transfusion, History of hepatitis C, s/p successful Rx w/ SVR24 - 9/2017 (07/29/2014), Hyperlipidemia (07/29/2014), Hypertension, Pancreatitis, S/P cadaveric kidney transplant 11/5/2016. ESRD secondary to HTN/DMII (11/05/2016), and Stroke (2024).    PAST SURGICAL HISTORY:  He  has a past surgical history that includes Appendectomy; Kidney transplant; Treatment of cardiac arrhythmia (N/A, 8/26/2019); Transesophageal echocardiography (N/A, 8/26/2019); Colonoscopy (N/A, 12/21/2020); Cataract extraction w/  intraocular lens implant (Right, 3/13/2024); Cataract extraction w/  intraocular lens implant (Left, 4/10/2024); Ablation of arrhythmogenic focus for atrial fibrillation (N/A, 6/11/2024); Cardioversion (6/11/2024);  ablation, atrial flutter, typical (6/11/2024); Transesophageal echocardiography (N/A, 6/11/2024); Bone marrow biopsy (Left, 6/26/2024); Treatment of cardiac arrhythmia (N/A, 2/3/2025); echocardiogram,transesophageal (N/A, 2/3/2025); Ablation of arrhythmogenic focus for atrial fibrillation (N/A, 6/12/2025); ablation, atrial flutter, typical (N/A, 6/12/2025); ablation, atrial flutter, atypical (6/12/2025); and Transesophageal echocardiography (N/A, 6/12/2025).    SOCIAL HISTORY:  He  reports that he quit smoking about 8 years ago. His smoking use included cigarettes. He started smoking about 40 years ago. He has a 16 pack-year smoking history. He has never used smokeless tobacco. He reports that he does not currently use alcohol. He reports that he does not use drugs.    FAMILY MEDICAL HISTORY:  His family history includes Cancer in his brother; Diabetes in his mother; Heart disease in his father and mother; Hypertension in his brother and mother.    Review of patient's allergies indicates:   Allergen Reactions    Nifedipine Other (See Comments)     Gingival hyperplasia        aspirin  81 mg Oral Daily    atorvastatin  80 mg Oral Daily    cefTRIAXone (Rocephin) IV (PEDS and ADULTS)  1 g Intravenous Q24H    epoetin tanya  4,000 Units Subcutaneous Q7 Days    ferrous sulfate  1 tablet Oral Daily    furosemide (LASIX) injection  60 mg Intravenous BID    hydrALAZINE 10 mg 2 tablets, hydrALAZINE 25 mg 2 tablets, isorsorbide dinitrate 20 mg  2 tablets combination   Oral TID    metoprolol tartrate  25 mg Oral BID    metroNIDAZOLE IV (PEDS and ADULTS)  500 mg Intravenous Q8H    polyethylene glycol  17 g Oral Daily    predniSONE  5 mg Oral Daily    rivaroxaban  15 mg Oral Daily with dinner    tacrolimus  1 mg Oral BID       Prior to Admission medications    Medication Sig Start Date End Date Taking? Authorizing Provider   acetaminophen (TYLENOL) 500 MG tablet Take 500 mg by mouth every 6 (six) hours as needed for Pain.     Provider, Historical   aspirin 81 mg Tab Take 81 mg by mouth once daily.    Provider, Historical   atorvastatin (LIPITOR) 80 MG tablet Take 1 tablet (80 mg total) by mouth once daily. 12/16/24 12/16/25  Gillies, Connor M, DO   blood sugar diagnostic Strp Use to check blood glucose 1 times daily, to use with insurance preferred meter 5/21/24   Mima Mack MD   blood-glucose meter Misc Use to check blood glucose 1 times daily, to use with insurance preferred meter 5/21/24   Mima Mack MD   blood-glucose sensor (DEXCOM G7 SENSOR) Kayla Change sensor every 10 days. 4/3/25   Karan Lopez APRN, FNP   doxazosin (CARDURA) 8 MG Tab Take 1 tablet (8 mg total) by mouth once daily. 5/5/25 5/5/26  Cliff Guaman MD   empagliflozin (JARDIANCE) 10 mg tablet Take 1 tablet (10 mg total) by mouth once daily. 7/7/25   Randi Mejia PA-C   ergocalciferol (VITAMIN D2) 50,000 unit Cap Take 1 capsule (50,000 Units total) by mouth every 7 days. 7/2/25 9/1/25  Karan Lopez APRN, FNP   famotidine (PEPCID) 20 MG tablet Take 1 tablet by mouth daily as needed (Acid reflux).    Provider, Historical   ferrous sulfate (FEOSOL) 325 mg (65 mg iron) Tab tablet Take 1 tablet (325 mg total) by mouth daily with breakfast. 6/24/25   Francesco Lopez MD   gabapentin (NEURONTIN) 300 MG capsule Take 1 capsule (300 mg total) by mouth 2 (two) times daily. 5/5/25 5/5/26  Cliff Guaman MD   insulin aspart U-100 (NOVOLOG FLEXPEN U-100 INSULIN) 100 unit/mL (3 mL) InPn pen Use pump. Max daily 100 units    Provider, Historical   insulin aspart U-100 (NOVOLOG U-100 INSULIN ASPART) 100 unit/mL injection Use in pump, max daily 100 units. 4/3/25   Karan Lopez APRN, FNP   insulin pump cart,auto,BT,G6/7 (OMNIPOD 5 G6-G7 PODS, GEN 5,) Crtg Change every 48 hours. 4/10/25   Karan Lopez, APRN, FNP   isosorbide-hydrALAZINE 20-37.5 mg (BIDIL) 20-37.5 mg Tab Take 2 tablets by mouth 3 (three) times daily. 6/27/25    "Paris Lujan PA-C   lancets 30 gauge Misc Use to check blood glucose 1 times daily, to use with insurance preferred meter 5/21/24   Mima Mack MD   magnesium oxide (MAG-OX) 400 mg (241.3 mg magnesium) tablet Take 1 tablet (400 mg total) by mouth 2 (two) times daily. 3/18/25 3/18/26  Sandra Kern MD   meclizine (ANTIVERT) 25 mg tablet Take 1 tablet (25 mg total) by mouth 3 (three) times daily as needed for Dizziness. 3/14/24   Karan Lopez APRN, FNP   melatonin 3 mg Cap Take 2 capsules as needed by oral route at bedtime.    Provider, Historical   metOLazone (ZAROXOLYN) 2.5 MG tablet Take 1 tablet (2.5 mg total) by mouth once. Take 30 minutes before torsemide dose for 1 dose 7/2/25 7/2/25  Paris Lujan PA-C   metoprolol tartrate (LOPRESSOR) 25 MG tablet Take 1 tablet (25 mg total) by mouth 2 (two) times daily. 6/7/25 6/7/26  Zuri Vidales MD   mycophenolate (CELLCEPT) 250 mg Cap Take 500 mg by mouth 2 (two) times daily.  Patient not taking: Reported on 6/30/2025    Provider, Historical   pen needle, diabetic (BD ULTRA-FINE LO PEN NEEDLE) 32 gauge x 5/32" Ndle USE TO ADMINISTER INSULIN 4 TIMES DAILY. 1/13/25   Karan Lopez APRN, FNP   polyethylene glycol (MIRALAX) 17 gram PwPk Take 17 g by mouth daily as needed for Constipation. Take 17 gm (mixed in liquid) by mouth every day.    Provider, Historical   predniSONE (DELTASONE) 5 MG tablet Take 1 tablet (5 mg total) by mouth once daily. 12/3/24   Emilia Abreu NP   rivaroxaban (XARELTO) 15 mg Tab Take 1 tablet (15 mg total) by mouth daily with dinner or evening meal. 8/21/24 8/16/25  Mima Mack MD   tacrolimus (PROGRAF) 1 MG Cap Take 1 capsule (1 mg total) by mouth every 12 (twelve) hours. 3/19/25 3/19/26  Sandra Cook MD   torsemide (DEMADEX) 20 MG Tab Take 2 tablets (40 mg total) by mouth once daily. 6/18/25 6/18/26  Paris Lujan PA-C   insulin lispro (HUMALOG KWIKPEN INSULIN) 100 unit/mL pen " "Inject 18 units w/ breakfast, 16 units w/ lunch and dinner plus scale 150-200 +2, 201-250 +4, 251-300 +6, 301-350 +8. 1/25/22 1/2/23  Karan Lopez, APRN, FNP        REVIEW OF SYSTEMS:  Patient has no fever, fatigue, visual changes, chest pain, edema, cough, dyspnea, nausea, vomiting, constipation, diarrhea, arthralgias, pruritis, dizziness, weakness, depression, confusion.      Intake/Output Summary (Last 24 hours) at 7/13/2025 0728  Last data filed at 7/13/2025 0605  Gross per 24 hour   Intake 600 ml   Output 600 ml   Net 0 ml       PHYSICAL EXAM:   height is 6' 1" (1.854 m) and weight is 95.4 kg (210 lb 5.1 oz). His temperature is 98.4 °F (36.9 °C). His blood pressure is 192/88 (abnormal) and his pulse is 72. His respiration is 17 and oxygen saturation is 95%.   Gen: WDWN male in no apparent distress  Psych: Normal mood and affect  Skin: No rashes or ulcers  Eyes: Normal conjunctiva and lids, PERRLA  ENT: Normal hearing with no oropharyngeal lesions  Neck: No JVD  Chest: Clear with no rales, rhonchi, wheezing with normal effort  CV: Regular with no murmurs, gallops or rubs  Abd: Soft, nontender, no distension, positive bowel sounds  Ext: No cyanosis, clubbing or edema    Recent Labs   Lab 07/07/25  0956 07/12/25 2021 07/13/25 0311    137 135*   K 3.6 4.3 3.3*    101 101   CO2 28 22* 22*   BUN 69* 68* 66*   CREATININE 3.8* 3.9* 3.9*   CALCIUM 9.0 8.7 8.2*   PHOS  --  3.3 3.1     Recent Labs   Lab 07/12/25 2021 07/13/25 0311   WBC 7.54 6.36   HGB 8.9* 8.5*   HCT 30.2* 30.4*   * 118*       IMPRESSION AND RECOMMENDATIONS:    Ckd stage 4  Tito: likely cardiorenal syndrome and or hemodynamic changes associated with hypertensive crisis. Will need gradual BP control to minimize kidney injury, low salt diet, daily labs, daily prograf level, continue with home dose prograf, continue with prednisone. Adjust other meds based on current GFR if needed (antibiotics) .   Hypokalemia  Mild hyponatremia: " fever: r/o cmv infection vs community acquired pneumonia COVID and influenza negative respiratory panel negative   Mild thrombocytopenia: will order serum CMV cuu to cytopenia and fever and immunocompromised state. Hold MMF due to fever, continue with home dose   Heart failure with mild pulmonary edema: last echo low normal EF. New echo ordered for this admission  Hypertensive crisis   Type 2 DM  Possible CAP: Rocephin and vanco. Will consult transplant ID for further input

## 2025-07-13 NOTE — SUBJECTIVE & OBJECTIVE
Interval HPI:   Overnight events: No acute events overnight. Patient in room ED . Blood glucose stable. BG at goal on current insulin regimen (Home Insulin Pump- has been off since yesterday evening). Steroid use- None.    Renal function- Abnormal- Creatinine 3.8    Vasopressors-  None       Endocrine will continue to follow and manage insulin orders inpatient.       Diet Low Sodium (2 gm) Fluid - 1500mL     Eatin%  Nausea: No  Hypoglycemia and intervention: No  Fever: No  TPN and/or TF: No    PMH, PSH, FH, SH updated and reviewed     ROS:  Review of Systems   Constitutional:  Negative for unexpected weight change.   Eyes:  Negative for visual disturbance.   Respiratory:  Negative for cough.    Cardiovascular:  Negative for chest pain.   Gastrointestinal:  Negative for nausea and vomiting.   Endocrine: Negative for polydipsia and polyuria.   Musculoskeletal:  Negative for back pain.   Skin:  Negative for rash.   Neurological:  Positive for syncope and light-headedness.   Psychiatric/Behavioral:  Positive for confusion. Negative for agitation and dysphoric mood.        Current Medications and/or Treatments Impacting Glycemic Control  Immunotherapy:    Immunosuppressants           Stop Route Frequency     tacrolimus capsule 1 mg         -- Oral 2 times daily          Steroids:   Hormones (From admission, onward)      Start     Stop Route Frequency Ordered    25 0900  predniSONE tablet 5 mg         -- Oral Daily 25 0543    25 0138  melatonin tablet 6 mg         -- Oral Nightly PRN 25 0040          Pressors:    Autonomic Drugs (From admission, onward)      None          Hyperglycemia/Diabetes Medications:   Antihyperglycemics (From admission, onward)      Start     Stop Route Frequency Ordered    25 0140  insulin aspart U-100 pen 0-5 Units         -- SubQ Before meals & nightly PRN 25 0040             PHYSICAL EXAMINATION:  Vitals:    25 1200   BP: (!) 175/77    Pulse: (!) 59   Resp:    Temp:      Body mass index is 27.71 kg/m².     Physical Exam  Constitutional:       Appearance: He is well-developed.   HENT:      Head: Normocephalic.   Eyes:      Conjunctiva/sclera: Conjunctivae normal.   Pulmonary:      Effort: Pulmonary effort is normal.   Musculoskeletal:         General: Normal range of motion.   Skin:     General: Skin is warm.      Findings: No rash.   Neurological:      Mental Status: He is alert and oriented to person, place, and time.

## 2025-07-13 NOTE — H&P
"  [facility] - Emergency Dept  Hospital Medicine  History & Physical    Patient Name: Ravi Zamorano  MRN: [id number]  Patient Class: OP- Observation  Admission Date: 7/12/2025  Attending Physician: Celine Rodriguez MD   Primary Care Provider: Mima Mack MD    Patient information was obtained from patient, past medical records, and ER records.     Subjective:     Principal Problem:Acute on chronic diastolic congestive heart failure    Chief Complaint:   Chief Complaint   Patient presents with    Dizziness     Pt reports chronic but worsening since this morning. Pt states "when I lean forward I feel like I'm going fall over." Hx of kidney transplant and and CVA 2024        HPI: Ravi Zamorano is a 69 y.o. male s/p kidney transplant in 2016 on tacro, CKD 4, HFpEF, T2DM with insulin pump use, afib on xarelto and AICD being admitted to hospital medicine for manangement of PHUONG on CKD and CHF exacerbation.  Patient reports increased feelings of lightheadedness/dizziness with leaning forward the past several days. Endorses associated unsteady gait and bilateral "hand twitching". During the encounter he seems to have a difficult time explaining his symptoms or their duration. Denies any recent illness, cough, congestion, N/V/D, abdominal pain, dysuria or hematuria. Febrile to 101.4 F on admission, when asked if having recent fevers he says he often has fevers to 101 F at home but is unable to tell me the most recent time this has happened. Sates he does not know what his current home medication regimen is.       In ED: hypertensive to 230/98, febrile to Tmax 101.4 F, remaining vitals stable. CBC without leukocytosis, hgb 8.5 which is baseline. CMP notable for PHUONG on CKD (creatinine 3.9, most recent baseline ~3.1). BNP elevated to 4,888 (elevated from most recent 3,164 6 days ago). Lactic acid and procal wnl. UA non infectious. CT head without acute intracranial abnormality. CXR with cardiomegaly with " interstitial edema. Given 1 0 mg IV hydralazine x1, 20 mg IV hydralazine x1 and 80 mg IV lasix in the ED. Admitted to hospital medicine for further management.     Past Medical History:   Diagnosis Date    Anxiety 07/29/2014    Arthritis     atrial fibrillation     History of cardioversion    Bilateral diabetic retinopathy 2017    Cardioembolic stroke 12/07/2024    almost completely resolved - very mild left hand weakness    CKD (chronic kidney disease)     in transplanted kidney    Colon polyps 2014    Depression - situational 07/29/2014    Diabetes type 2 2000    since 2000.  c/b neuropathy, CKD    Encounter for blood transfusion     History of hepatitis C, s/p successful Rx w/ SVR24 - 9/2017 07/29/2014    Genotype 1a, treatment naive 10/2014 liver biopsy - grade 1 / stage 1 Completed Harvoni w/ SVR    Hyperlipidemia 07/29/2014    Hypertension     Pancreatitis     S/P cadaveric kidney transplant 11/5/2016. ESRD secondary to HTN/DMII 11/05/2016    Stroke 2024       Past Surgical History:   Procedure Laterality Date    ABLATION OF ARRHYTHMOGENIC FOCUS FOR ATRIAL FIBRILLATION N/A 6/11/2024    Procedure: Ablation atrial fibrillation;  Surgeon: Bronson Bowden MD;  Location: [facility] EP LAB;  Service: Cardiology;  Laterality: N/A;  AF, FELICIANO (cx if SR), PVI, RFA, Carto, Gen, GP, 3 Prep    ABLATION OF ARRHYTHMOGENIC FOCUS FOR ATRIAL FIBRILLATION N/A 6/12/2025    Procedure: Ablation atrial fibrillation;  Surgeon: Bronson Bowden MD;  Location: [facility] EP LAB;  Service: Cardiology;  Laterality: N/A;  AF, FELICIANO (h/o CVA), redo PVI, CTI, RFA, Carto, Gen, GP, 3 Prep *insulin pump*    ABLATION, ATRIAL FLUTTER, ATYPICAL  6/12/2025    Procedure: Ablation, Atrial Flutter, Atypical;  Surgeon: Bronson Bowden MD;  Location: [facility] EP LAB;  Service: Cardiology;;    ABLATION, ATRIAL FLUTTER, TYPICAL  6/11/2024    Procedure: Ablation, Atrial Flutter, Typical;  Surgeon: Bronson Bowden MD;  Location: [facility] EP LAB;  Service: Cardiology;;     ABLATION, ATRIAL FLUTTER, TYPICAL N/A 6/12/2025    Procedure: Ablation, Atrial Flutter, Typical;  Surgeon: Bronson Bowden MD;  Location: [facility] EP LAB;  Service: Cardiology;  Laterality: N/A;    APPENDECTOMY      BONE MARROW BIOPSY Left 6/26/2024    Procedure: Biopsy-bone marrow;  Surgeon: Bridger Zapata MD;  Location: [facility] ENDO (4TH FLR);  Service: Oncology;  Laterality: Left;  6/24-pt knows to hold aspirin and eliquis as instructed by hem/onc, precall complete-Kpvt    CARDIOVERSION  6/11/2024    Procedure: Cardioversion;  Surgeon: Bronson Bowden MD;  Location: [facility] EP LAB;  Service: Cardiology;;    CATARACT EXTRACTION W/  INTRAOCULAR LENS IMPLANT Right 3/13/2024    Procedure: EXTRACTION, CATARACT, WITH IOL INSERTION;  Surgeon: Jennifer Palacio MD;  Location: [facility] OR;  Service: Ophthalmology;  Laterality: Right;    CATARACT EXTRACTION W/  INTRAOCULAR LENS IMPLANT Left 4/10/2024    Procedure: EXTRACTION, CATARACT, WITH IOL INSERTION;  Surgeon: Jennifer Palacio MD;  Location: [facility] OR;  Service: Ophthalmology;  Laterality: Left;    COLONOSCOPY N/A 12/21/2020    Procedure: COLONOSCOPY;  Surgeon: Tico Bell MD;  Location: [facility] ENDO (4TH FLR);  Service: Endoscopy;  Laterality: N/A;  ok to hold eliquis per Dr. Valadez, see telephone encounter 11/13/2020-MS  covid test 12/18 [place]    ECHOCARDIOGRAM,TRANSESOPHAGEAL N/A 2/3/2025    Procedure: Transesophageal echo (FELICIANO) intra-procedure log documentation;  Surgeon: Grayson Lin III, MD;  Location: [facility] EP LAB;  Service: Cardiology;  Laterality: N/A;    KIDNEY TRANSPLANT      TRANSESOPHAGEAL ECHOCARDIOGRAPHY N/A 8/26/2019    Procedure: ECHOCARDIOGRAM, TRANSESOPHAGEAL;  Surgeon: [facility] Diagnostic Provider;  Location: [facility] EP LAB;  Service: Cardiology;  Laterality: N/A;    TRANSESOPHAGEAL ECHOCARDIOGRAPHY N/A 6/11/2024    Procedure: ECHOCARDIOGRAM, TRANSESOPHAGEAL;  Surgeon: Grayson Lin III, MD;  Location: [facility] EP LAB;  Service: Cardiology;   Laterality: N/A;    TRANSESOPHAGEAL ECHOCARDIOGRAPHY N/A 6/12/2025    Procedure: ECHOCARDIOGRAM, TRANSESOPHAGEAL;  Surgeon: Breezy Nguyen MD;  Location: [facility] EP LAB;  Service: Cardiology;  Laterality: N/A;    TREATMENT OF CARDIAC ARRHYTHMIA N/A 8/26/2019    Procedure: CARDIOVERSION;  Surgeon: Bronson Bowden MD;  Location: [facility] EP LAB;  Service: Cardiology;  Laterality: N/A;  AF, FELICIANO, DCCV, MAC, GP, 3 PREP    TREATMENT OF CARDIAC ARRHYTHMIA N/A 2/3/2025    Procedure: Cardioversion or Defibrillation;  Surgeon: Bronson Bowden MD;  Location: [facility] EP LAB;  Service: Cardiology;  Laterality: N/A;  AF, FELICIANO, DCCV, MAC, GP, 3 Prep       Review of patient's allergies indicates:   Allergen Reactions    Nifedipine Other (See Comments)     Gingival hyperplasia       Current Facility-Administered Medications on File Prior to Encounter   Medication    epoetin tanya injection 4,000 Units     Current Outpatient Medications on File Prior to Encounter   Medication Sig    acetaminophen (TYLENOL) 500 MG tablet Take 500 mg by mouth every 6 (six) hours as needed for Pain.    aspirin 81 mg Tab Take 81 mg by mouth once daily.    atorvastatin (LIPITOR) 80 MG tablet Take 1 tablet (80 mg total) by mouth once daily.    blood sugar diagnostic Strp Use to check blood glucose 1 times daily, to use with insurance preferred meter    blood-glucose meter Misc Use to check blood glucose 1 times daily, to use with insurance preferred meter    blood-glucose sensor (DEXCOM G7 SENSOR) Kayla Change sensor every 10 days.    doxazosin (CARDURA) 8 MG Tab Take 1 tablet (8 mg total) by mouth once daily.    empagliflozin (JARDIANCE) 10 mg tablet Take 1 tablet (10 mg total) by mouth once daily.    ergocalciferol (VITAMIN D2) 50,000 unit Cap Take 1 capsule (50,000 Units total) by mouth every 7 days.    famotidine (PEPCID) 20 MG tablet Take 1 tablet by mouth daily as needed (Acid reflux).    ferrous sulfate (FEOSOL) 325 mg (65 mg iron) Tab tablet Take 1  "tablet (325 mg total) by mouth daily with breakfast.    gabapentin (NEURONTIN) 300 MG capsule Take 1 capsule (300 mg total) by mouth 2 (two) times daily.    insulin aspart U-100 (NOVOLOG FLEXPEN U-100 INSULIN) 100 unit/mL (3 mL) InPn pen Use pump. Max daily 100 units    insulin aspart U-100 (NOVOLOG U-100 INSULIN ASPART) 100 unit/mL injection Use in pump, max daily 100 units.    insulin pump cart,auto,BT,G6/7 (OMNIPOD 5 G6-G7 PODS, GEN 5,) Crtg Change every 48 hours.    isosorbide-hydrALAZINE 20-37.5 mg (BIDIL) 20-37.5 mg Tab Take 2 tablets by mouth 3 (three) times daily.    lancets 30 gauge Misc Use to check blood glucose 1 times daily, to use with insurance preferred meter    magnesium oxide (MAG-OX) 400 mg (241.3 mg magnesium) tablet Take 1 tablet (400 mg total) by mouth 2 (two) times daily.    meclizine (ANTIVERT) 25 mg tablet Take 1 tablet (25 mg total) by mouth 3 (three) times daily as needed for Dizziness.    melatonin 3 mg Cap Take 2 capsules as needed by oral route at bedtime.    metOLazone (ZAROXOLYN) 2.5 MG tablet Take 1 tablet (2.5 mg total) by mouth once. Take 30 minutes before torsemide dose for 1 dose    metoprolol tartrate (LOPRESSOR) 25 MG tablet Take 1 tablet (25 mg total) by mouth 2 (two) times daily.    mycophenolate (CELLCEPT) 250 mg Cap Take 500 mg by mouth 2 (two) times daily. (Patient not taking: Reported on 6/30/2025)    pen needle, diabetic (BD ULTRA-FINE LO PEN NEEDLE) 32 gauge x 5/32" Ndle USE TO ADMINISTER INSULIN 4 TIMES DAILY.    polyethylene glycol (MIRALAX) 17 gram PwPk Take 17 g by mouth daily as needed for Constipation. Take 17 gm (mixed in liquid) by mouth every day.    predniSONE (DELTASONE) 5 MG tablet Take 1 tablet (5 mg total) by mouth once daily.    rivaroxaban (XARELTO) 15 mg Tab Take 1 tablet (15 mg total) by mouth daily with dinner or evening meal.    tacrolimus (PROGRAF) 1 MG Cap Take 1 capsule (1 mg total) by mouth every 12 (twelve) hours.    torsemide (DEMADEX) 20 MG " Tab Take 2 tablets (40 mg total) by mouth once daily.    [DISCONTINUED] insulin lispro (HUMALOG KWIKPEN INSULIN) 100 unit/mL pen Inject 18 units w/ breakfast, 16 units w/ lunch and dinner plus scale 150-200 +2, 201-250 +4, 251-300 +6, 301-350 +8.     Family History       Problem Relation (Age of Onset)    Cancer Brother    Diabetes Mother    Heart disease Mother, Father    Hypertension Mother, Brother          Tobacco Use    Smoking status: Former     Current packs/day: 0.00     Average packs/day: 0.5 packs/day for 32.0 years (16.0 ttl pk-yrs)     Types: Cigarettes     Start date: 1984     Quit date: 2016     Years since quittin.6    Smokeless tobacco: Never   Vaping Use    Vaping status: Never Used   Substance and Sexual Activity    Alcohol use: Not Currently    Drug use: No    Sexual activity: Yes     Partners: Female     Review of Systems   Constitutional:  Negative for activity change, chills and fever.   HENT:  Negative for trouble swallowing.    Eyes:  Negative for photophobia and visual disturbance.   Respiratory:  Negative for chest tightness, shortness of breath and wheezing.    Cardiovascular:  Negative for chest pain, palpitations and leg swelling.   Gastrointestinal:  Negative for abdominal pain, constipation, diarrhea, nausea and vomiting.   Genitourinary:  Negative for dysuria, frequency, hematuria and urgency.   Musculoskeletal:  Positive for gait problem. Negative for arthralgias and back pain.   Skin:  Negative for color change and rash.   Neurological:  Positive for tremors and light-headedness. Negative for dizziness, syncope, weakness, numbness and headaches.   Psychiatric/Behavioral:  Negative for agitation and confusion. The patient is not nervous/anxious.      Objective:     Vital Signs (Most Recent):  Temp: (S) 100 °F (37.8 °C) (25 035)  Pulse: 75 (25 050)  Resp: 19 (25)  BP: (!) 192/88 (25 050)  SpO2: (!) 92 % (25) Vital Signs (24h  Range):  Temp:  [98.1 °F (36.7 °C)-101.5 °F (38.6 °C)] 100 °F (37.8 °C)  Pulse:  [] 75  Resp:  [17-21] 19  SpO2:  [92 %-99 %] 92 %  BP: (170-248)/() 192/88     Weight: 95.4 kg (210 lb 5.1 oz)  Body mass index is 27.75 kg/m².     Physical Exam  Vitals and nursing note reviewed.   Constitutional:       General: He is not in acute distress.     Appearance: He is well-developed. He is not ill-appearing or toxic-appearing.   HENT:      Head: Normocephalic and atraumatic.   Eyes:      Extraocular Movements: Extraocular movements intact.   Neck:      Vascular: JVD present.   Cardiovascular:      Rate and Rhythm: Normal rate and regular rhythm.      Heart sounds: Normal heart sounds.      Comments: Trace edema   Pulmonary:      Effort: Pulmonary effort is normal. No respiratory distress.      Breath sounds: No wheezing or rales.   Abdominal:      General: Bowel sounds are normal. There is no distension.      Tenderness: There is no abdominal tenderness.      Comments: Omni pod noted to LUQ, patient deactivated device while I was in the room   Musculoskeletal:         General: No tenderness. Normal range of motion.      Right lower leg: Edema present.      Left lower leg: Edema present.   Skin:     General: Skin is warm and dry.      Findings: No rash.   Neurological:      Mental Status: He is alert.      Comments: Bilateral asterixis         Significant Labs: All pertinent labs within the past 24 hours have been reviewed.  CBC:   Recent Labs   Lab 07/12/25 2021 07/13/25 0311   WBC 7.54 6.36   HGB 8.9* 8.5*   HCT 30.2* 30.4*   * 118*     CMP:   Recent Labs   Lab 07/12/25 2021 07/13/25 0311    135*   K 4.3 3.3*    101   CO2 22* 22*   * 152*   BUN 68* 66*   CREATININE 3.9* 3.9*   CALCIUM 8.7 8.2*   PROT 7.0  --    ALBUMIN 3.1*  --    BILITOT 0.5  --    ALKPHOS 54  --    AST 23  --    ALT <5*  --    ANIONGAP 14 12     Cardiac Markers:   Recent Labs   Lab 07/12/25 2021   BNP 4,888*      Troponin:   Recent Labs   Lab 07/12/25 2021 07/13/25  0058 07/13/25  0311   TROPONINIHS 205* 224* 220*     TSH:   Recent Labs   Lab 07/12/25 2021   TSH 1.684     Urine Studies:   Recent Labs   Lab 07/12/25  2351   COLORU Colorless*   APPEARANCEUA Clear   PHUR 8.0   SPECGRAV 1.010   PROTEINUA 2+*   GLUCUA 3+*   BILIRUBINUA Negative   OCCULTUA Negative   NITRITE Negative   UROBILINOGEN Negative   LEUKOCYTESUR Negative   RBCUA 1   WBCUA <1   BACTERIA Rare   HYALINECASTS 0       Significant Imaging: I have reviewed all pertinent imaging results/findings within the past 24 hours.  Imaging Results              X-Ray Chest AP Portable (Final result)  Result time 07/12/25 23:49:08      Final result by Javier Eden MD (07/12/25 23:49:08)                   Impression:      Interval resolution small left effusion.    Cardiomegaly with interstitial edema, similar compared to prior.      Electronically signed by: Javier Eden MD  Date:    07/12/2025  Time:    23:49               Narrative:    EXAMINATION:  XR CHEST AP PORTABLE    CLINICAL HISTORY:  Unspecified atrial fibrillation    TECHNIQUE:  Single frontal view of the chest was performed.    COMPARISON:  06/03/2025.    FINDINGS:  Cardiomegaly with interstitial edema, similar compared to prior.    Interval resolution small left effusion.    Heart and lungs otherwise appear similar to prior when allowing for differences in technique and positioning.                                       CT Head Without Contrast (Final result)  Result time 07/12/25 23:47:08      Final result by Duong Jones MD (07/12/25 23:47:08)                   Impression:      Allowing for artifacts and pre-existing findings, CT demonstrates no acute intracranial abnormality.    Correlate clinically.  If there remains strong clinical suspicion for acute intracranial pathology, recommendations would include brain MRI and/or short-term follow-up head CT.      Electronically signed by: Duong  Khangjosé antonio  Date:    07/12/2025  Time:    23:47               Narrative:    EXAMINATION:  CT HEAD WITHOUT CONTRAST    CLINICAL HISTORY:  Headache, new or worsening (Age >= 50y);    Additional history, from the current ED provider note: Worsening dizziness    TECHNIQUE:  Low dose axial images were obtained through the head.  Coronal and sagittal reformations were also performed. Contrast was not administered.    COMPARISON:  Noncontrast head CT 02/20/2025.    FINDINGS:  Artifacts related to beam hardening and or motion degrade portions the scan.    Allowing for the artifacts, there is no scan evidence of hyperattenuating acute intracranial hemorrhage, large vascular territory brain infarct, discrete intracranial mass or mass effect, pathologic extra-axial fluid collection, midline shift, brain herniation, pneumocephalus, acute hydrocephalus, or diffuse brain edema.    There is some diffuse cerebral and cerebellar parenchymal volume loss, with a proportionate degree of presumed ex vacuo prominence of the ventricles, cisterns, sulci, and fissures.  These findings remain similar to the comparison study.  The transverse diameter of the 3rd ventricle remains approximately 11 mm, similar to the comparison study.  No apical cortical sulcal effacement is demonstrated.    There are some intracranial atherosclerotic calcifications.    The hyperdense appearance of the basilar artery and adjacent brainstem on a single axial image (series 2, image 8) is likely artifactual.    There remain some low-attenuation changes in the cerebral white matter, most apparent about the occipital horns and atria of the lateral ventricles.  These findings are similar to the comparison study and could be related to chronic small vessel ischemia.  Other white matter pathology is not fully excluded.    There remains a small 8 mm hypodensity in the right centrum semiovale, possibly representing an old infarct.  This was already present on  02/20/2025.    Partially empty sella.    No depressed calvarial fracture.    Mastoid air cells and visualized portions of the paranasal sinuses remain well-aerated.    Orbits demonstrate an old fracture of the left medial orbital wall through which some extraconal left orbital fat protrudes.  The left medial rectus muscle again straddles the fracture defect.  A small soft tissue attenuation structure, possibly a portion of the left superior oblique muscle, minimally protrudes into the fracture defect.  In some patients, these imaging findings could be associated with clinical signs or symptoms of extraocular muscle entrapment.     imaging: No contributory findings.                                    Assessment/Plan:     Assessment & Plan  Acute on chronic diastolic congestive heart failure   - CHF pathway initiated   - CXR revealed Cardiomegaly with interstitial edema,   - BNP 4,888 / troponin 205>>220  - EKG with atrial fibrillation recurrence (recent ablation 04/25)  - last echo showing ejection fraction of 50 - 55%.   - repeat TTE ordered   -  is not lasix naive. Begin diuresis with 60 mg IV lasix BID  - strict Is/Os   - cardiac, low sodium diet  - fluid restrict 1.5L while inpatient  - daily CMP   - monitor on cardiac telemetry   - supplemental O2 as needed   Febrile illness, acute  Unclear etiology, patient without symptoms to guide possible cause  - febrile to 101.4F on admission  - CBC without leukocytosis  - CMP with PHUONG  - UA non infectious  - lactic acid and procal wnl  - RIP negative   - BCX ordered  - CXR without consolidation  - CT head without acute process  - given immunocompromised state start empiric vanc, ceftriaxone and flagyl  - low threshold to consult ID   Acute kidney injury superimposed on stage 4 chronic kidney disease  CKD IV (severe)  Suspect etiology cardiorenal.    - Cr 3.9.  Baseline Cr 3.1  - possible signs of uremia? Asterixis on exam  - UA unremarkable   - renal transplant US  "pending   - nephrology consulted, appreciate recommendations   - hold ACE-I or ARB while renal function dynamic  - Monitor UOP and follow serial BMP   - Adjust drugs to GFR/CrCL; avoid nephrotoxic drugs   -  Estimated Creatinine Clearance: 20.8 mL/min (A) (based on SCr of 3.9 mg/dL (H)).   - Monitor electrolytes, phos levels daily  Type 2 diabetes mellitus with stage 4 chronic kidney disease, with long-term current use of insulin  Insulin pump in place  - Patient's FSGs are controlled on current hypoglycemics.   - Last A1c reviewed-   Lab Results   Component Value Date    HGBA1C 6.4 (H) 06/03/2025     - Most recent fingerstick glucose reviewed- No results for input(s): "POCTGLUCOSE" in the last 24 hours.  - Current correctional scale Low  Maintain anti-hyperglycemic dose as follows-   Antihyperglycemics (From admission, onward)      Start     Stop Route Frequency Ordered    07/13/25 0140  insulin aspart U-100 pen 0-5 Units         -- SubQ Before meals & nightly PRN 07/13/25 0040          - endocrine consulted for insulin pump management   Hyperlipidemia associated with type 2 diabetes mellitus  - continue statin  S/P cadaveric kidney transplant 11/5/2016. ESRD secondary to HTN/DMII  Immunocompromised state due to drug therapy  Long-term use of immunosuppressant medication  - continue home tacro 1 mg BID  - KTM consulted  - renal transplant  ordered  - daily BMP, daily tacro levels   Hypertensive urgency  Patient has a current diagnosis of hypertensive urgency (without evidence of end organ damage) which is controlled.  Latest blood pressure and vitals reviewed-   Temp:  [98.1 °F (36.7 °C)-101.5 °F (38.6 °C)]   Pulse:  []   Resp:  [17-21]   BP: (170-248)/()   SpO2:  [92 %-99 %] .   Patient currently off IV antihypertensives.   Home meds for hypertension were reviewed and noted below.   Hypertension Medications                   isosorbide-hydrALAZINE 20-37.5 mg (BIDIL) 20-37.5 mg Tab Take 2 tablets by " mouth 3 (three) times daily.         metoprolol tartrate (LOPRESSOR) 25 MG tablet Take 1 tablet (25 mg total) by mouth 2 (two) times daily.             Will aim for controlled BP reduction by medications noted above. Monitor and mitigate end organ damage as indicated.  Paroxysmal atrial fibrillation  Long term (current) use of anticoagulants  Patient has paroxysmal (<7 days) atrial fibrillation. Patient is currently in atrial fibrillation. [id number] Score: 3. The patients heart rate in the last 24 hours is as follows:  Pulse  Min: 65  Max: 105  Antiarrhythmics  metoprolol tartrate (LOPRESSOR) tablet 25 mg, 2 times daily, Oral  Anticoagulants  rivaroxaban tablet 15 mg, With dinner, Oral    Plan  - Replete lytes with a goal of K>4, Mg >2  - Patient is anticoagulated, see medications listed above.  - Patient's afib is currently controlled  - afib recurrence after recent ablation, consider EP consult if symptomatic   Thrombocytopenia, unspecified  The patients 3 most recent labs are listed below.  Recent Labs     07/12/25 2021 07/13/25 0311   * 118*     Plan  - Will transfuse if platelet count is <10k.  - daily CBC   Anemia of chronic disease  Anemia is likely due to chronic disease due to Chronic Kidney Disease. Most recent hemoglobin and hematocrit are listed below.  Recent Labs     07/12/25 2021 07/13/25 0311   HGB 8.9* 8.5*   HCT 30.2* 30.4*     Plan  - Monitor serial CBC: Daily  - Transfuse PRBC if patient becomes hemodynamically unstable, symptomatic or H/H drops below 7/21.  - Patient has not received any PRBC transfusions to date  - Patient's anemia is currently stable  History of CVA (cerebrovascular accident)  - noted  - continue home statin and asa   Hyponatremia  The patient's most recent sodium results are listed below.  Recent Labs     07/12/25 2021 07/13/25 0311    135*     Plan  - Correct the sodium by 4-6mEq in 24 hours.   - Obtain the following studies: TSH, T4.  - Monitor sodium  Daily.   - Patient hyponatremia is stable  Hypokalemia  Patient's most recent potassium results are listed below.   Recent Labs     07/12/25 2021 07/13/25  0311   K 4.3 3.3*     Plan  - Replete potassium per protocol  - Monitor potassium Daily  - Patient's hypokalemia is stable  Hypertension associated with stage 4 chronic kidney disease due to type 2 diabetes mellitus  Uncontrolled on admission  - continue home bidil TID, lospressor 25 mg BID  - vitals q4h   VTE Risk Mitigation (From admission, onward)           Ordered     rivaroxaban tablet 15 mg  With dinner         07/13/25 0543     Reason for No Pharmacological VTE Prophylaxis  Once        Question:  Reasons:  Answer:  Already adequately anticoagulated on oral Anticoagulants    07/13/25 0040     IP VTE HIGH RISK PATIENT  Once         07/13/25 0040     Place sequential compression device  Until discontinued         07/13/25 0040                On 07/13/2025, patient should be placed in hospital observation services under my care in collaboration with Huber Car MD.          Dina James PA-C  Department of Hospital Medicine  [facility] - Emergency Dept

## 2025-07-13 NOTE — ED NOTES
Took report from Michelle CUTLER, and assumed care of pt at this time. Pt resting comfortably and independently repositioned in stretcher with bed locked in lowest position for safety. NAD noted at this time. Respirations even and unlabored and visible chest rise noted.  Patient offered bathroom assistance and denies need at this time. Pt instructed to call if assistance is needed. Pt on continuous cardiac, BP, and O2 monitoring. Call light within reach. No needs at this time. Will continue to monitor.

## 2025-07-13 NOTE — CONSULTS
Hospital Medicine Resident  History and Physical  [facility]  07/13/2025  5:30 PM      Patient Name: Ravi Zamorano   MRN: [id number]   Current Provider: Celine Rodriguez MD  Primary Care Provider: Mima Mack MD   Admission Date: 7/12/2025   Hospital Day: 0  Bed: 69995/89078 A  Principal Problem: Acute on chronic diastolic congestive heart failure       SUBJECTIVE:       History of Present Illness:  Ravi Zamorano is a 69 y.o. male with a relevant medical history of s/p kidney transplant in 2016 on tacro, CKD 4, HFpEF, T2DM with insulin pump use, afib on xarelto and AICD who presented to the ED for dizziness with leaning forward. In ED, /98, febrile 101.4F, remaining vitals stable. BNP 4888. 1 0 mg IV hydralazine x1, 20 mg IV hydralazine x1 and 80 mg IV lasix in the ED. Patient was admitted to hospital medicine for further management.    Nephrology was consulted for PHUONG on CKD4.      - Patient CKD Status? ?  Yes, CKD4    - Recent Nephrotoxic Medications? ?  Yes, vancomycin and tacrolimus    - Recent Hypotension / Hypertension?  ?  Yes (/98 in ED)    - Recent Imaging Contrast? ?  No        Review of Systems   Constitutional:  Negative for fever.   Respiratory:  Negative for shortness of breath.    Gastrointestinal:  Negative for nausea and vomiting.       All other pertinent ROS listed in the H&P.       HISTORY:     Past Medical History:   Diagnosis Date    Anxiety 07/29/2014    Arthritis     atrial fibrillation     History of cardioversion    Bilateral diabetic retinopathy 2017    Cardioembolic stroke 12/07/2024    almost completely resolved - very mild left hand weakness    CKD (chronic kidney disease)     in transplanted kidney    Colon polyps 2014    Depression - situational 07/29/2014    Diabetes type 2 2000    since 2000.  c/b neuropathy, CKD    Encounter for blood transfusion     History of hepatitis C, s/p successful Rx w/ SVR24 - 9/2017 07/29/2014    Genotype 1a, treatment  naive 10/2014 liver biopsy - grade 1 / stage 1 Completed Harvoni w/ SVR    Hyperlipidemia 07/29/2014    Hypertension     Pancreatitis     S/P cadaveric kidney transplant 11/5/2016. ESRD secondary to HTN/DMII 11/05/2016    Stroke 2024       Past Surgical History:   Procedure Laterality Date    ABLATION OF ARRHYTHMOGENIC FOCUS FOR ATRIAL FIBRILLATION N/A 6/11/2024    Procedure: Ablation atrial fibrillation;  Surgeon: Bronson Bowden MD;  Location: [facility] EP LAB;  Service: Cardiology;  Laterality: N/A;  AF, FELICIANO (cx if SR), PVI, RFA, Carto, Gen, GP, 3 Prep    ABLATION OF ARRHYTHMOGENIC FOCUS FOR ATRIAL FIBRILLATION N/A 6/12/2025    Procedure: Ablation atrial fibrillation;  Surgeon: Bronson Bowden MD;  Location: [facility] EP LAB;  Service: Cardiology;  Laterality: N/A;  AF, FELICIANO (h/o CVA), redo PVI, CTI, RFA, Carto, Gen, GP, 3 Prep *insulin pump*    ABLATION, ATRIAL FLUTTER, ATYPICAL  6/12/2025    Procedure: Ablation, Atrial Flutter, Atypical;  Surgeon: Bronson Bowden MD;  Location: [facility] EP LAB;  Service: Cardiology;;    ABLATION, ATRIAL FLUTTER, TYPICAL  6/11/2024    Procedure: Ablation, Atrial Flutter, Typical;  Surgeon: Bronson Bowden MD;  Location: [facility] EP LAB;  Service: Cardiology;;    ABLATION, ATRIAL FLUTTER, TYPICAL N/A 6/12/2025    Procedure: Ablation, Atrial Flutter, Typical;  Surgeon: Bronson Bowden MD;  Location: [facility] EP LAB;  Service: Cardiology;  Laterality: N/A;    APPENDECTOMY      BONE MARROW BIOPSY Left 6/26/2024    Procedure: Biopsy-bone marrow;  Surgeon: Bridger Zapata MD;  Location: [facility] ENDO (4TH FLR);  Service: Oncology;  Laterality: Left;  6/24-pt knows to hold aspirin and eliquis as instructed by hem/onc, precall complete-Kpvt    CARDIOVERSION  6/11/2024    Procedure: Cardioversion;  Surgeon: Bronson Bowden MD;  Location: [facility] EP LAB;  Service: Cardiology;;    CATARACT EXTRACTION W/  INTRAOCULAR LENS IMPLANT Right 3/13/2024    Procedure: EXTRACTION, CATARACT, WITH IOL INSERTION;   Surgeon: Jennifer Palacio MD;  Location: [facility] OR;  Service: Ophthalmology;  Laterality: Right;    CATARACT EXTRACTION W/  INTRAOCULAR LENS IMPLANT Left 4/10/2024    Procedure: EXTRACTION, CATARACT, WITH IOL INSERTION;  Surgeon: Jennifer Palacio MD;  Location: [facility] OR;  Service: Ophthalmology;  Laterality: Left;    COLONOSCOPY N/A 12/21/2020    Procedure: COLONOSCOPY;  Surgeon: Tico Bell MD;  [facility]  Service: Endoscopy;  Laterality: N/A;  ok to hold eliquis per Dr. Valadez, see telephone encounter 11/13/2020-MS  covid test 12/18 [place]    ECHOCARDIOGRAM,TRANSESOPHAGEAL N/A 2/3/2025    Procedure: Transesophageal echo (FELICIANO) intra-procedure log documentation;  Surgeon: Grayson Lin III, MD;  Location: [facility] EP LAB;  Service: Cardiology;  Laterality: N/A;    KIDNEY TRANSPLANT      TRANSESOPHAGEAL ECHOCARDIOGRAPHY N/A 8/26/2019    Procedure: ECHOCARDIOGRAM, TRANSESOPHAGEAL;  Surgeon: [facility] Diagnostic Provider;  Location: [facility] EP LAB;  Service: Cardiology;  Laterality: N/A;    TRANSESOPHAGEAL ECHOCARDIOGRAPHY N/A 6/11/2024    Procedure: ECHOCARDIOGRAM, TRANSESOPHAGEAL;  Surgeon: Grayson Lin III, MD;  Location: [facility] EP LAB;  Service: Cardiology;  Laterality: N/A;    TRANSESOPHAGEAL ECHOCARDIOGRAPHY N/A 6/12/2025    Procedure: ECHOCARDIOGRAM, TRANSESOPHAGEAL;  Surgeon: Breezy Nguyen MD;  Location: [facility] EP LAB;  Service: Cardiology;  Laterality: N/A;    TREATMENT OF CARDIAC ARRHYTHMIA N/A 8/26/2019    Procedure: CARDIOVERSION;  Surgeon: Bronson Bowden MD;  Location: [facility] EP LAB;  Service: Cardiology;  Laterality: N/A;  AF, FELICIANO, DCCV, MAC, GP, 3 PREP    TREATMENT OF CARDIAC ARRHYTHMIA N/A 2/3/2025    Procedure: Cardioversion or Defibrillation;  Surgeon: Bronson Bowden MD;  Location: [facility] EP LAB;  Service: Cardiology;  Laterality: N/A;  AF, FELICIANO, DCCV, MAC, GP, 3 Prep       Family History   Problem Relation Name Age of Onset    Diabetes Mother      Hypertension Mother       Heart disease Mother          CAD with PCI    Heart disease Father      Cancer Brother 2         one sister with breast cancer    Hypertension Brother 2         one sister with HTN and borderline DM    Stroke Neg Hx      Kidney disease Neg Hx      Colon cancer Neg Hx      Esophageal cancer Neg Hx         Social History     Socioeconomic History    Marital status:    Occupational History     Employer: [employer]   Tobacco Use    Smoking status: Former     Current packs/day: 0.00     Average packs/day: 0.5 packs/day for 32.0 years (16.0 ttl pk-yrs)     Types: Cigarettes     Start date: 1984     Quit date: 2016     Years since quittin.6    Smokeless tobacco: Never   Vaping Use    Vaping status: Never Used   Substance and Sexual Activity    Alcohol use: Not Currently    Drug use: No    Sexual activity: Yes     Partners: Female   Social History Narrative     for 14 years     for 38 years    2 children ages 41, 42     Social Drivers of Health     Financial Resource Strain: Low Risk  (2025)    Overall Financial Resource Strain (CARDIA)     Difficulty of Paying Living Expenses: Not hard at all   Food Insecurity: No Food Insecurity (2025)    Hunger Vital Sign     Worried About Running Out of Food in the Last Year: Never true     Ran Out of Food in the Last Year: Never true   Transportation Needs: No Transportation Needs (2025)    PRAPARE - Transportation     Lack of Transportation (Medical): No     Lack of Transportation (Non-Medical): No   Physical Activity: Inactive (2025)    Exercise Vital Sign     Days of Exercise per Week: 0 days     Minutes of Exercise per Session: 0 min   Stress: No Stress Concern Present (6/3/2025)    [employer] - Occupational Stress Questionnaire     Feeling of Stress : Only a little   Housing Stability: Low Risk  (2025)    Housing Stability Vital Sign     Unable to Pay for Housing in the Last  Year: No     Number of Times Moved in the Last Year: 0     Homeless in the Last Year: No           MEDICATIONS & ALLERGIES:     Medications Ordered Prior to Encounter[1]      Review of patient's allergies indicates:   Allergen Reactions    Nifedipine Other (See Comments)     Gingival hyperplasia       OBJECTIVE:     Vital Signs Recent:  Temp: 98.5 °F (36.9 °C) (07/13/25 0943)  Pulse: (!) 50 (07/13/25 1720)  Resp: 19 (07/13/25 1720)  BP: (!) 152/73 (07/13/25 1705)  SpO2: 99 % (07/13/25 1720)  Oxygen Documentation:                Device (Oxygen Therapy): room air               I & O (Last 24H):  Intake/Output Summary (Last 24 hours) at 7/13/2025 1730  Last data filed at 7/13/2025 1451  Gross per 24 hour   Intake 700 ml   Output 600 ml   Net 100 ml      Vital Signs Range (Last 24H):  Temp:  [98.1 °F (36.7 °C)-101.5 °F (38.6 °C)]   Pulse:  []   Resp:  [10-96]   BP: (152-248)/()   SpO2:  [90 %-100 %]          Weight:  Body mass index is 27.71 kg/m².  Wt Readings from Last 3 Encounters:   07/13/25 95.3 kg (210 lb)   07/03/25 95.4 kg (210 lb 5.1 oz)   06/27/25 95.4 kg (210 lb 3.3 oz)              Physical Exam  Cardiovascular:      Rate and Rhythm: Normal rate.   Pulmonary:      Effort: Pulmonary effort is normal.      Breath sounds: Normal breath sounds.   Abdominal:      Tenderness: There is no abdominal tenderness.   Musculoskeletal:      Right lower leg: No edema.      Left lower leg: No edema.            Lab Value    RENAL FUNCTION:   BUN (mg/dL)   Date Value   07/13/2025 66 (H)   07/12/2025 68 (H)   07/07/2025 69 (H)     Creatinine (mg/dL)   Date Value   07/13/2025 3.9 (H)   07/12/2025 3.9 (H)   07/07/2025 3.8 (H)     Albumin (g/dL)   Date Value   07/12/2025 3.1 (L)   07/07/2025 3.4 (L)   06/27/2025 3.1 (L)   03/19/2025 2.5 (L)   03/18/2025 2.5 (L)   03/17/2025 2.5 (L)     eGFR if non African American (mL/min/1.73 m^2)   Date Value   07/19/2022 44.9 (A)   07/12/2022 52.7 (A)   07/11/2022 44.9 (A)          ELECTROLYTES:  Sodium (mmol/L)   Date Value   07/13/2025 135 (L)   07/12/2025 137   07/07/2025 138   03/19/2025 133 (L)   03/18/2025 134 (L)   03/17/2025 133 (L)     Potassium (mmol/L)   Date Value   07/13/2025 3.3 (L)   07/12/2025 4.3   07/07/2025 3.6   03/19/2025 3.6   03/18/2025 3.4 (L)   03/17/2025 3.5     Chloride (mmol/L)   Date Value   07/13/2025 101   07/12/2025 101   07/07/2025 100   03/19/2025 102   03/18/2025 100   03/17/2025 98     CO2 (mmol/L)   Date Value   07/13/2025 22 (L)   07/12/2025 22 (L)   07/07/2025 28   03/19/2025 19 (L)   03/18/2025 22 (L)   03/17/2025 24     Calcium (mg/dL)   Date Value   07/13/2025 8.2 (L)   07/12/2025 8.7   07/07/2025 9.0   03/19/2025 8.0 (L)   03/18/2025 8.2 (L)   03/17/2025 7.9 (L)     Magnesium  (mg/dL)   Date Value   07/13/2025 1.9   07/12/2025 2.2   06/27/2025 1.9     Phosphorus Level (mg/dL)   Date Value   07/13/2025 3.1   07/12/2025 3.3   06/27/2025 4.4     Phosphorus (mg/dL)   Date Value   03/19/2025 2.4 (L)   03/18/2025 3.3   03/17/2025 2.6 (L)         LIVER FUNCTION:   Alkaline Phosphatase (U/L)   Date Value   03/19/2025 42   03/18/2025 44   03/17/2025 43     ALP (unit/L)   Date Value   07/12/2025 54   07/07/2025 55   06/27/2025 50     ALT   Date Value   07/12/2025 <5 unit/L (L)   07/07/2025 <5 unit/L (L)   06/27/2025 <5 unit/L (L)   03/19/2025 <5 U/L (L)   03/18/2025 <5 U/L (L)   03/17/2025 <5 U/L (L)     AST   Date Value   07/12/2025 23 unit/L   07/07/2025 13 unit/L   06/27/2025 12 unit/L   03/19/2025 11 U/L   03/18/2025 10 U/L   03/17/2025 9 U/L (L)     Protein Total (gm/dL)   Date Value   07/12/2025 7.0   07/07/2025 6.6   06/27/2025 6.3     Total Protein (g/dL)   Date Value   03/19/2025 5.6 (L)   03/18/2025 5.8 (L)   03/17/2025 5.5 (L)     Total Bilirubin (mg/dL)   Date Value   03/19/2025 0.4   03/18/2025 0.4   03/17/2025 0.7     Bilirubin Total (mg/dL)   Date Value   07/12/2025 0.5   07/07/2025 0.4   06/27/2025 0.4     INR (no units)   Date Value  "  06/11/2025 1.4 (H)   06/03/2025 1.4 (H)   01/27/2025 1.3 (H)   12/07/2024 1.3 (H)   06/04/2024 1.2         OTHER:   WBC (K/uL)   Date Value   07/13/2025 6.36   07/12/2025 7.54   06/17/2025 4.91     Hemoglobin (g/dL)   Date Value   03/19/2025 8.7 (L)   03/18/2025 8.5 (L)   03/17/2025 8.8 (L)     HGB (gm/dL)   Date Value   07/13/2025 8.5 (L)   07/12/2025 8.9 (L)   07/02/2025 8.8 (L)     Platelet Count (K/uL)   Date Value   07/13/2025 118 (L)   07/12/2025 136 (L)   06/17/2025 151     Platelets (K/uL)   Date Value   03/19/2025 129 (L)   03/18/2025 120 (L)   03/17/2025 136 (L)            Diagnostic Results:    UA 07/12/2025:  Color: colorless  Appearance: clear  Specific gravity: 1.010  pH: 8.0  Protein: 2+  Glucose: 3+  Ketones: negative  Blood: negative  Nitrite: negative  Bilirubin: negative  Urobilinogen: negative  Leukocyte esterase: negative  RBC: 1  WBC: <1  Bacteria: rare  Hyaline casts: 0  Yeast: none  Squamous epithelium: <1    Urine microscopy 07/12/2025:  "Other formed elements not mentioned in the report are not present in the microscopic examination."      ASSESSMENT -- PLAN:   Ravi Zamorano is a 69 y.o. male with a relevant medical history of s/p kidney transplant in 2016 on tacro, CKD 4, HFpEF, T2DM with insulin pump use, afib on xarelto and AICD who is being treated for Acute on chronic diastolic congestive heart failure.      Nephrology consulted for PHUONG on CKD4    Impression:  PHUONG KDIGO stage 1  Baseline Creatinine:  ~3.2-3.8  Creatinine at time of consult: 3.9  Tests done:  UA 07/12/2025 unremarkable  Etiology of PHUONG: unclear at this time but likely intrinsic due to CRS vs vancomycin vs tacrolimus       Labs: Na 135, K 3.3, CO2 22, BUN 66 (down from 68 on admit), Cr 3.9 (stable from admit)     Recommendations :     - Patient currently on lasix 60 mg IV. Recommend continuing lasix 60 mg IV.  - Continue monitoring BUN, Cr and electrolytes at this time. Monitor I/O for volume status.  - Monitor " serum chemistries daily, strict intake and output Qshift , daily weights if able.  - Renal protective measures: Please adjust medications for reduced clearance  - Avoid nephrotoxic medications (NSAID, IV contrast)  - Avoid ACEi and ARBs in the setting of PHUONG  - Maintain MAP > 65  - Transfuse for Hb <7     Thank you for allowing us to participate in the care of this patient.  Plan discussed with attending and/or fellow. Please call with any questions or concerns.     Tootie Proctor DO  [facility],  PGY-1        [1]   No current facility-administered medications on file prior to encounter.     Current Outpatient Medications on File Prior to Encounter   Medication Sig Dispense Refill    acetaminophen (TYLENOL) 500 MG tablet Take 500 mg by mouth every 6 (six) hours as needed for Pain.      aspirin 81 mg Tab Take 81 mg by mouth once daily.      atorvastatin (LIPITOR) 80 MG tablet Take 1 tablet (80 mg total) by mouth once daily. 90 tablet 3    blood sugar diagnostic Strp Use to check blood glucose 1 times daily, to use with insurance preferred meter 100 each 11    blood-glucose meter Misc Use to check blood glucose 1 times daily, to use with insurance preferred meter 1 each 0    blood-glucose sensor (DEXCOM G7 SENSOR) Kayla Change sensor every 10 days. 3 each 11    doxazosin (CARDURA) 8 MG Tab Take 1 tablet (8 mg total) by mouth once daily. 30 tablet 11    empagliflozin (JARDIANCE) 10 mg tablet Take 1 tablet (10 mg total) by mouth once daily. 90 tablet 0    ergocalciferol (VITAMIN D2) 50,000 unit Cap Take 1 capsule (50,000 Units total) by mouth every 7 days. 4 capsule 6    famotidine (PEPCID) 20 MG tablet Take 1 tablet by mouth daily as needed (Acid reflux).      ferrous sulfate (FEOSOL) 325 mg (65 mg iron) Tab tablet Take 1 tablet (325 mg total) by mouth daily with breakfast. 90 tablet 0    gabapentin (NEURONTIN) 300 MG capsule Take 1 capsule (300 mg total) by mouth 2 (two) times daily. 60 capsule 11     "insulin aspart U-100 (NOVOLOG FLEXPEN U-100 INSULIN) 100 unit/mL (3 mL) InPn pen Use pump. Max daily 100 units      insulin aspart U-100 (NOVOLOG U-100 INSULIN ASPART) 100 unit/mL injection Use in pump, max daily 100 units. 30 mL 11    insulin pump cart,auto,BT,G6/7 (OMNIPOD 5 G6-G7 PODS, GEN 5,) Crtg Change every 48 hours. 45 each 3    isosorbide-hydrALAZINE 20-37.5 mg (BIDIL) 20-37.5 mg Tab Take 2 tablets by mouth 3 (three) times daily. 180 tablet 11    lancets 30 gauge Misc Use to check blood glucose 1 times daily, to use with insurance preferred meter 100 each 3    magnesium oxide (MAG-OX) 400 mg (241.3 mg magnesium) tablet Take 1 tablet (400 mg total) by mouth 2 (two) times daily. 60 tablet 11    meclizine (ANTIVERT) 25 mg tablet Take 1 tablet (25 mg total) by mouth 3 (three) times daily as needed for Dizziness. 21 tablet 3    melatonin 3 mg Cap Take 2 capsules as needed by oral route at bedtime.      metOLazone (ZAROXOLYN) 2.5 MG tablet Take 1 tablet (2.5 mg total) by mouth once. Take 30 minutes before torsemide dose for 1 dose 1 tablet 0    metoprolol tartrate (LOPRESSOR) 25 MG tablet Take 1 tablet (25 mg total) by mouth 2 (two) times daily. 180 tablet 3    mycophenolate (CELLCEPT) 250 mg Cap Take 500 mg by mouth 2 (two) times daily. (Patient not taking: Reported on 6/30/2025)      pen needle, diabetic (BD ULTRA-FINE LO PEN NEEDLE) 32 gauge x 5/32" Ndle USE TO ADMINISTER INSULIN 4 TIMES DAILY. 400 each 3    polyethylene glycol (MIRALAX) 17 gram PwPk Take 17 g by mouth daily as needed for Constipation. Take 17 gm (mixed in liquid) by mouth every day.      predniSONE (DELTASONE) 5 MG tablet Take 1 tablet (5 mg total) by mouth once daily. 30 tablet 11    rivaroxaban (XARELTO) 15 mg Tab Take 1 tablet (15 mg total) by mouth daily with dinner or evening meal. 30 tablet 11    tacrolimus (PROGRAF) 1 MG Cap Take 1 capsule (1 mg total) by mouth every 12 (twelve) hours. 60 capsule 11    torsemide (DEMADEX) 20 MG Tab " Take 2 tablets (40 mg total) by mouth once daily. 60 tablet 11    [DISCONTINUED] insulin lispro (HUMALOG KWIKPEN INSULIN) 100 unit/mL pen Inject 18 units w/ breakfast, 16 units w/ lunch and dinner plus scale 150-200 +2, 201-250 +4, 251-300 +6, 301-350 +8. 30 mL 4

## 2025-07-13 NOTE — SUBJECTIVE & OBJECTIVE
Past Medical History:   Diagnosis Date    Anxiety 07/29/2014    Arthritis     atrial fibrillation     History of cardioversion    Bilateral diabetic retinopathy 2017    Cardioembolic stroke 12/07/2024    almost completely resolved - very mild left hand weakness    CKD (chronic kidney disease)     in transplanted kidney    Colon polyps 2014    Depression - situational 07/29/2014    Diabetes type 2 2000    since 2000.  c/b neuropathy, CKD    Encounter for blood transfusion     History of hepatitis C, s/p successful Rx w/ SVR24 - 9/2017 07/29/2014    Genotype 1a, treatment naive 10/2014 liver biopsy - grade 1 / stage 1 Completed Harvoni w/ SVR    Hyperlipidemia 07/29/2014    Hypertension     Pancreatitis     S/P cadaveric kidney transplant 11/5/2016. ESRD secondary to HTN/DMII 11/05/2016    Stroke 2024       Past Surgical History:   Procedure Laterality Date    ABLATION OF ARRHYTHMOGENIC FOCUS FOR ATRIAL FIBRILLATION N/A 6/11/2024    Procedure: Ablation atrial fibrillation;  Surgeon: Bronson Bowden MD;  Location: [facility];  Service: Cardiology;  Laterality: N/A;  AF, FELICIANO (cx if SR), PVI, RFA, Carto, Gen, GP, 3 Prep    ABLATION OF ARRHYTHMOGENIC FOCUS FOR ATRIAL FIBRILLATION N/A 6/12/2025    Procedure: Ablation atrial fibrillation;  Surgeon: Bronson Bowden MD;  Location: [facility];  Service: Cardiology;  Laterality: N/A;  AF, FELICIANO (h/o CVA), redo PVI, CTI, RFA, Carto, Gen, GP, 3 Prep *insulin pump*    ABLATION, ATRIAL FLUTTER, ATYPICAL  6/12/2025    Procedure: Ablation, Atrial Flutter, Atypical;  Surgeon: Bronson Bowden MD;  Location: [facility];  Service: Cardiology;;    ABLATION, ATRIAL FLUTTER, TYPICAL  6/11/2024    Procedure: Ablation, Atrial Flutter, Typical;  Surgeon: Bronson Bowden MD;  Location: [facility];  Service: Cardiology;;    ABLATION, ATRIAL FLUTTER, TYPICAL N/A 6/12/2025    Procedure: Ablation, Atrial Flutter, Typical;  Surgeon: Bronson Bowden MD;  Location: [facility];  Service:  Cardiology;  Laterality: N/A;    APPENDECTOMY      BONE MARROW BIOPSY Left 6/26/2024    Procedure: Biopsy-bone marrow;  Surgeon: Bridger Zapata MD;  Location: [facility] ENDO (4TH FLR);  Service: Oncology;  Laterality: Left;  6/24-pt knows to hold aspirin and eliquis as instructed by hem/onc, precall complete-Kpvt    CARDIOVERSION  6/11/2024    Procedure: Cardioversion;  Surgeon: Bronson Bowden MD;  Location: [facility] EP LAB;  Service: Cardiology;;    CATARACT EXTRACTION W/  INTRAOCULAR LENS IMPLANT Right 3/13/2024    Procedure: EXTRACTION, CATARACT, WITH IOL INSERTION;  Surgeon: Jennifer Palacio MD;  Location: [facility] OR;  Service: Ophthalmology;  Laterality: Right;    CATARACT EXTRACTION W/  INTRAOCULAR LENS IMPLANT Left 4/10/2024    Procedure: EXTRACTION, CATARACT, WITH IOL INSERTION;  Surgeon: Jennifer Palacio MD;  Location: [facility] OR;  Service: Ophthalmology;  Laterality: Left;    COLONOSCOPY N/A 12/21/2020    Procedure: COLONOSCOPY;  Surgeon: Tico Bell MD;  Location: [facility] ENDO (4TH FLR);  Service: Endoscopy;  Laterality: N/A;  ok to hold eliquis per Dr. Valadez, see telephone encounter 11/13/2020-MS  covid test 12/18 [place]    ECHOCARDIOGRAM,TRANSESOPHAGEAL N/A 2/3/2025    Procedure: Transesophageal echo (FELICIANO) intra-procedure log documentation;  Surgeon: Grayson Lin III, MD;  Location: [facility] EP LAB;  Service: Cardiology;  Laterality: N/A;    KIDNEY TRANSPLANT      TRANSESOPHAGEAL ECHOCARDIOGRAPHY N/A 8/26/2019    Procedure: ECHOCARDIOGRAM, TRANSESOPHAGEAL;  Surgeon: [facility];  Location: [facility] EP [facility];  Service: Cardiology;  Laterality: N/A;    TRANSESOPHAGEAL ECHOCARDIOGRAPHY N/A 6/11/2024    Procedure: ECHOCARDIOGRAM, TRANSESOPHAGEAL;  Surgeon: Grayson Lin III, MD;  Location: [facility] EP LAB;  Service: Cardiology;  Laterality: N/A;    TRANSESOPHAGEAL ECHOCARDIOGRAPHY N/A 6/12/2025    Procedure: ECHOCARDIOGRAM, TRANSESOPHAGEAL;  Surgeon: Breezy Nguyen MD;  Location: [facility] EP LAB;   Service: Cardiology;  Laterality: N/A;    TREATMENT OF CARDIAC ARRHYTHMIA N/A 8/26/2019    Procedure: CARDIOVERSION;  Surgeon: Bronson Bowden MD;  Location: [facility] EP LAB;  Service: Cardiology;  Laterality: N/A;  AF, FELICIANO, DCCV, MAC, GP, 3 PREP    TREATMENT OF CARDIAC ARRHYTHMIA N/A 2/3/2025    Procedure: Cardioversion or Defibrillation;  Surgeon: Bronson Bowden MD;  Location: [facility] EP LAB;  Service: Cardiology;  Laterality: N/A;  AF, FELICIANO, DCCV, MAC, GP, 3 Prep       Review of patient's allergies indicates:   Allergen Reactions    Nifedipine Other (See Comments)     Gingival hyperplasia       Current Facility-Administered Medications on File Prior to Encounter   Medication    epoetin tanya injection 4,000 Units     Current Outpatient Medications on File Prior to Encounter   Medication Sig    acetaminophen (TYLENOL) 500 MG tablet Take 500 mg by mouth every 6 (six) hours as needed for Pain.    aspirin 81 mg Tab Take 81 mg by mouth once daily.    atorvastatin (LIPITOR) 80 MG tablet Take 1 tablet (80 mg total) by mouth once daily.    blood sugar diagnostic Strp Use to check blood glucose 1 times daily, to use with insurance preferred meter    blood-glucose meter Misc Use to check blood glucose 1 times daily, to use with insurance preferred meter    blood-glucose sensor (DEXCOM G7 SENSOR) Kayla Change sensor every 10 days.    doxazosin (CARDURA) 8 MG Tab Take 1 tablet (8 mg total) by mouth once daily.    empagliflozin (JARDIANCE) 10 mg tablet Take 1 tablet (10 mg total) by mouth once daily.    ergocalciferol (VITAMIN D2) 50,000 unit Cap Take 1 capsule (50,000 Units total) by mouth every 7 days.    famotidine (PEPCID) 20 MG tablet Take 1 tablet by mouth daily as needed (Acid reflux).    ferrous sulfate (FEOSOL) 325 mg (65 mg iron) Tab tablet Take 1 tablet (325 mg total) by mouth daily with breakfast.    gabapentin (NEURONTIN) 300 MG capsule Take 1 capsule (300 mg total) by mouth 2 (two) times daily.    insulin aspart U-100  "(NOVOLOG FLEXPEN U-100 INSULIN) 100 unit/mL (3 mL) InPn pen Use pump. Max daily 100 units    insulin aspart U-100 (NOVOLOG U-100 INSULIN ASPART) 100 unit/mL injection Use in pump, max daily 100 units.    insulin pump cart,auto,BT,G6/7 (OMNIPOD 5 G6-G7 PODS, GEN 5,) Crtg Change every 48 hours.    isosorbide-hydrALAZINE 20-37.5 mg (BIDIL) 20-37.5 mg Tab Take 2 tablets by mouth 3 (three) times daily.    lancets 30 gauge Misc Use to check blood glucose 1 times daily, to use with insurance preferred meter    magnesium oxide (MAG-OX) 400 mg (241.3 mg magnesium) tablet Take 1 tablet (400 mg total) by mouth 2 (two) times daily.    meclizine (ANTIVERT) 25 mg tablet Take 1 tablet (25 mg total) by mouth 3 (three) times daily as needed for Dizziness.    melatonin 3 mg Cap Take 2 capsules as needed by oral route at bedtime.    metOLazone (ZAROXOLYN) 2.5 MG tablet Take 1 tablet (2.5 mg total) by mouth once. Take 30 minutes before torsemide dose for 1 dose    metoprolol tartrate (LOPRESSOR) 25 MG tablet Take 1 tablet (25 mg total) by mouth 2 (two) times daily.    mycophenolate (CELLCEPT) 250 mg Cap Take 500 mg by mouth 2 (two) times daily. (Patient not taking: Reported on 6/30/2025)    pen needle, diabetic (BD ULTRA-FINE LO PEN NEEDLE) 32 gauge x 5/32" Ndle USE TO ADMINISTER INSULIN 4 TIMES DAILY.    polyethylene glycol (MIRALAX) 17 gram PwPk Take 17 g by mouth daily as needed for Constipation. Take 17 gm (mixed in liquid) by mouth every day.    predniSONE (DELTASONE) 5 MG tablet Take 1 tablet (5 mg total) by mouth once daily.    rivaroxaban (XARELTO) 15 mg Tab Take 1 tablet (15 mg total) by mouth daily with dinner or evening meal.    tacrolimus (PROGRAF) 1 MG Cap Take 1 capsule (1 mg total) by mouth every 12 (twelve) hours.    torsemide (DEMADEX) 20 MG Tab Take 2 tablets (40 mg total) by mouth once daily.    [DISCONTINUED] insulin lispro (HUMALOG KWIKPEN INSULIN) 100 unit/mL pen Inject 18 units w/ breakfast, 16 units w/ lunch " and dinner plus scale 150-200 +2, 201-250 +4, 251-300 +6, 301-350 +8.     Family History       Problem Relation (Age of Onset)    Cancer Brother    Diabetes Mother    Heart disease Mother, Father    Hypertension Mother, Brother          Tobacco Use    Smoking status: Former     Current packs/day: 0.00     Average packs/day: 0.5 packs/day for 32.0 years (16.0 ttl pk-yrs)     Types: Cigarettes     Start date: 1984     Quit date: 2016     Years since quittin.6    Smokeless tobacco: Never   Vaping Use    Vaping status: Never Used   Substance and Sexual Activity    Alcohol use: Not Currently    Drug use: No    Sexual activity: Yes     Partners: Female     Review of Systems   Constitutional:  Negative for activity change, chills and fever.   HENT:  Negative for trouble swallowing.    Eyes:  Negative for photophobia and visual disturbance.   Respiratory:  Negative for chest tightness, shortness of breath and wheezing.    Cardiovascular:  Negative for chest pain, palpitations and leg swelling.   Gastrointestinal:  Negative for abdominal pain, constipation, diarrhea, nausea and vomiting.   Genitourinary:  Negative for dysuria, frequency, hematuria and urgency.   Musculoskeletal:  Positive for gait problem. Negative for arthralgias and back pain.   Skin:  Negative for color change and rash.   Neurological:  Positive for tremors and light-headedness. Negative for dizziness, syncope, weakness, numbness and headaches.   Psychiatric/Behavioral:  Negative for agitation and confusion. The patient is not nervous/anxious.      Objective:     Vital Signs (Most Recent):  Temp: (S) 100 °F (37.8 °C) (25 0355)  Pulse: 75 (25 0500)  Resp: 19 (25 0355)  BP: (!) 192/88 (25 0500)  SpO2: (!) 92 % (25 0500) Vital Signs (24h Range):  Temp:  [98.1 °F (36.7 °C)-101.5 °F (38.6 °C)] 100 °F (37.8 °C)  Pulse:  [] 75  Resp:  [17-21] 19  SpO2:  [92 %-99 %] 92 %  BP: (170-248)/() 192/88      Weight: 95.4 kg (210 lb 5.1 oz)  Body mass index is 27.75 kg/m².     Physical Exam  Vitals and nursing note reviewed.   Constitutional:       General: He is not in acute distress.     Appearance: He is well-developed. He is not ill-appearing or toxic-appearing.   HENT:      Head: Normocephalic and atraumatic.   Eyes:      Extraocular Movements: Extraocular movements intact.   Neck:      Vascular: JVD present.   Cardiovascular:      Rate and Rhythm: Normal rate and regular rhythm.      Heart sounds: Normal heart sounds.      Comments: Trace edema   Pulmonary:      Effort: Pulmonary effort is normal. No respiratory distress.      Breath sounds: No wheezing or rales.   Abdominal:      General: Bowel sounds are normal. There is no distension.      Tenderness: There is no abdominal tenderness.      Comments: Omni pod noted to LUQ, patient deactivated device while I was in the room   Musculoskeletal:         General: No tenderness. Normal range of motion.      Right lower leg: Edema present.      Left lower leg: Edema present.   Skin:     General: Skin is warm and dry.      Findings: No rash.   Neurological:      Mental Status: He is alert.      Comments: Bilateral asterixis         Significant Labs: All pertinent labs within the past 24 hours have been reviewed.  CBC:   Recent Labs   Lab 07/12/25 2021 07/13/25 0311   WBC 7.54 6.36   HGB 8.9* 8.5*   HCT 30.2* 30.4*   * 118*     CMP:   Recent Labs   Lab 07/12/25 2021 07/13/25 0311    135*   K 4.3 3.3*    101   CO2 22* 22*   * 152*   BUN 68* 66*   CREATININE 3.9* 3.9*   CALCIUM 8.7 8.2*   PROT 7.0  --    ALBUMIN 3.1*  --    BILITOT 0.5  --    ALKPHOS 54  --    AST 23  --    ALT <5*  --    ANIONGAP 14 12     Cardiac Markers:   Recent Labs   Lab 07/12/25 2021   BNP 4,888*     Troponin:   Recent Labs   Lab 07/12/25 2021 07/13/25  0058 07/13/25 0311   TROPONINIHS 205* 224* 220*     TSH:   Recent Labs   Lab 07/12/25 2021   TSH 1.684      Urine Studies:   Recent Labs   Lab 07/12/25  2351   COLORU Colorless*   APPEARANCEUA Clear   PHUR 8.0   SPECGRAV 1.010   PROTEINUA 2+*   GLUCUA 3+*   BILIRUBINUA Negative   OCCULTUA Negative   NITRITE Negative   UROBILINOGEN Negative   LEUKOCYTESUR Negative   RBCUA 1   WBCUA <1   BACTERIA Rare   HYALINECASTS 0       Significant Imaging: I have reviewed all pertinent imaging results/findings within the past 24 hours.  Imaging Results              X-Ray Chest AP Portable (Final result)  Result time 07/12/25 23:49:08      Final result by Javier Eden MD (07/12/25 23:49:08)                   Impression:      Interval resolution small left effusion.    Cardiomegaly with interstitial edema, similar compared to prior.      Electronically signed by: Javier Eden MD  Date:    07/12/2025  Time:    23:49               Narrative:    EXAMINATION:  XR CHEST AP PORTABLE    CLINICAL HISTORY:  Unspecified atrial fibrillation    TECHNIQUE:  Single frontal view of the chest was performed.    COMPARISON:  06/03/2025.    FINDINGS:  Cardiomegaly with interstitial edema, similar compared to prior.    Interval resolution small left effusion.    Heart and lungs otherwise appear similar to prior when allowing for differences in technique and positioning.                                       CT Head Without Contrast (Final result)  Result time 07/12/25 23:47:08      Final result by Duong Jones MD (07/12/25 23:47:08)                   Impression:      Allowing for artifacts and pre-existing findings, CT demonstrates no acute intracranial abnormality.    Correlate clinically.  If there remains strong clinical suspicion for acute intracranial pathology, recommendations would include brain MRI and/or short-term follow-up head CT.      Electronically signed by: Duong Jones  Date:    07/12/2025  Time:    23:47               Narrative:    EXAMINATION:  CT HEAD WITHOUT CONTRAST    CLINICAL HISTORY:  Headache, new or worsening  (Age >= 50y);    Additional history, from the current ED provider note: Worsening dizziness    TECHNIQUE:  Low dose axial images were obtained through the head.  Coronal and sagittal reformations were also performed. Contrast was not administered.    COMPARISON:  Noncontrast head CT 02/20/2025.    FINDINGS:  Artifacts related to beam hardening and or motion degrade portions the scan.    Allowing for the artifacts, there is no scan evidence of hyperattenuating acute intracranial hemorrhage, large vascular territory brain infarct, discrete intracranial mass or mass effect, pathologic extra-axial fluid collection, midline shift, brain herniation, pneumocephalus, acute hydrocephalus, or diffuse brain edema.    There is some diffuse cerebral and cerebellar parenchymal volume loss, with a proportionate degree of presumed ex vacuo prominence of the ventricles, cisterns, sulci, and fissures.  These findings remain similar to the comparison study.  The transverse diameter of the 3rd ventricle remains approximately 11 mm, similar to the comparison study.  No apical cortical sulcal effacement is demonstrated.    There are some intracranial atherosclerotic calcifications.    The hyperdense appearance of the basilar artery and adjacent brainstem on a single axial image (series 2, image 8) is likely artifactual.    There remain some low-attenuation changes in the cerebral white matter, most apparent about the occipital horns and atria of the lateral ventricles.  These findings are similar to the comparison study and could be related to chronic small vessel ischemia.  Other white matter pathology is not fully excluded.    There remains a small 8 mm hypodensity in the right centrum semiovale, possibly representing an old infarct.  This was already present on 02/20/2025.    Partially empty sella.    No depressed calvarial fracture.    Mastoid air cells and visualized portions of the paranasal sinuses remain well-aerated.    Orbits  demonstrate an old fracture of the left medial orbital wall through which some extraconal left orbital fat protrudes.  The left medial rectus muscle again straddles the fracture defect.  A small soft tissue attenuation structure, possibly a portion of the left superior oblique muscle, minimally protrudes into the fracture defect.  In some patients, these imaging findings could be associated with clinical signs or symptoms of extraocular muscle entrapment.     imaging: No contributory findings.

## 2025-07-13 NOTE — HPI
"Reason for Consult: Management of T2DM, Hyperglycemia      Diabetes diagnosis year: 2000      Home Diabetes Medications:    OMNIPOD 5  0.6 u/hr mn-0600 , 9p-mn, 0.7 u/hr 6a-9p   Target 120-130 mg/dl   Iob 3 hours  Max bolus 20 units   Max basal 2 u/hr   Isf 40 mn-mn  ICR 1 to 7.5      Presets:  Snack: 4 units (28g)  Small Meal: 8 units (56g)  Medium Meal: 12 units (90g)  Large Meal: 15 units (114g)  Super Large Meal: 18 units (130g)     How often checking glucose at home? >4 x day   BG readings on regimen: 150s  Hypoglycemia on the regimen?  No  Missed doses on regimen?  No     Diabetes Complications include:     Hyperglycemia     Complicating diabetes co morbidities:   S/p Kidney tx 2016         HPI:Ravi Zamorano is a 69 y.o. male s/p kidney transplant in 2016 on tacro, CKD 4, HFpEF, T2DM with insulin pump use, afib on xarelto and AICD being admitted to hospital medicine for manangement of PHUONG on CKD and CHF exacerbation. Patient reports increased feelings of lightheadedness/dizziness with leaning forward the past several days. Endorses associated unsteady gait and bilateral "hand twitching". In the ED hypertensive to 230/98, febrile to Tmax 101.4 F. Endocrine consulted to manage hyperglycemia, type 2 diabetes, and insulin pump therapy  in the context of the inpatient setting.     Lab Results   Component Value Date    HGBA1C 6.5 (H) 07/13/2025         "

## 2025-07-13 NOTE — ASSESSMENT & PLAN NOTE
Patient has paroxysmal (<7 days) atrial fibrillation. Patient is currently in atrial fibrillation. [id number] Score: 3. The patients heart rate in the last 24 hours is as follows:  Pulse  Min: 65  Max: 105  Antiarrhythmics  metoprolol tartrate (LOPRESSOR) tablet 25 mg, 2 times daily, Oral  Anticoagulants  rivaroxaban tablet 15 mg, With dinner, Oral    Plan  - Replete lytes with a goal of K>4, Mg >2  - Patient is anticoagulated, see medications listed above.  - Patient's afib is currently controlled  - afib recurrence after recent ablation, consider EP consult if symptomatic

## 2025-07-13 NOTE — ED PROVIDER NOTES
"Encounter Date: 7/12/2025       History     Chief Complaint   Patient presents with    Dizziness     Pt reports chronic but worsening since this morning. Pt states "when I lean forward I feel like I'm going fall over." Hx of kidney transplant and and CVA 2024     69-year-old man with comorbidities of arthritis, previous paroxysmal atrial fib status post ablation, previous stroke with mild left-sided deficits, CKD of a CAD of Franky kidney transplant from 2016, as well as diabetes and hypertension presents to the emergency department for evaluation of worsening dizziness which he has experienced over approximately the last 7 days.  He denies any associated fever or chills as well as any previous history of trauma.  He reports getting significantly dizzy with a sensation that he will fall if he leans forward.  Additionally, he does exhibit evidence of significant hypertension in the ED, and admits that he is unclear whether or not he had taken his afternoon Bidil dosing.    The history is provided by the patient, medical records and a relative. No  was used.     Review of patient's allergies indicates:   Allergen Reactions    Nifedipine Other (See Comments)     Gingival hyperplasia     Past Medical History:   Diagnosis Date    Anxiety 07/29/2014    Arthritis     atrial fibrillation     History of cardioversion    Bilateral diabetic retinopathy 2017    Cardioembolic stroke 12/07/2024    almost completely resolved - very mild left hand weakness    CKD (chronic kidney disease)     in transplanted kidney    Colon polyps 2014    Depression - situational 07/29/2014    Diabetes type 2 2000    since 2000.  c/b neuropathy, CKD    Encounter for blood transfusion     History of hepatitis C, s/p successful Rx w/ SVR24 - 9/2017 07/29/2014    Genotype 1a, treatment naive 10/2014 liver biopsy - grade 1 / stage 1 Completed Harvoni w/ SVR    Hyperlipidemia 07/29/2014    Hypertension     Hyponatremia 02/20/2025    " Pancreatitis     S/P cadaveric kidney transplant 11/5/2016. ESRD secondary to HTN/DMII 11/05/2016    Stroke 2024     Past Surgical History:   Procedure Laterality Date    ABLATION OF ARRHYTHMOGENIC FOCUS FOR ATRIAL FIBRILLATION N/A 6/11/2024    Procedure: Ablation atrial fibrillation;  Surgeon: Bronson Bowden MD;  Location: [facility] EP LAB;  Service: Cardiology;  Laterality: N/A;  AF, FELICIANO (cx if SR), PVI, RFA, Carto, Gen, GP, 3 Prep    ABLATION OF ARRHYTHMOGENIC FOCUS FOR ATRIAL FIBRILLATION N/A 6/12/2025    Procedure: Ablation atrial fibrillation;  Surgeon: Bronson Bowden MD;  Location: [facility] EP LAB;  Service: Cardiology;  Laterality: N/A;  AF, FELICIANO (h/o CVA), redo PVI, CTI, RFA, Carto, Gen, GP, 3 Prep *insulin pump*    ABLATION, ATRIAL FLUTTER, ATYPICAL  6/12/2025    Procedure: Ablation, Atrial Flutter, Atypical;  Surgeon: Bronson Bowden MD;  Location: [facility] EP LAB;  Service: Cardiology;;    ABLATION, ATRIAL FLUTTER, TYPICAL  6/11/2024    Procedure: Ablation, Atrial Flutter, Typical;  Surgeon: Bronson Bowden MD;  Location: [facility] EP LAB;  Service: Cardiology;;    ABLATION, ATRIAL FLUTTER, TYPICAL N/A 6/12/2025    Procedure: Ablation, Atrial Flutter, Typical;  Surgeon: Bronson Bowden MD;  Location: [facility] EP LAB;  Service: Cardiology;  Laterality: N/A;    APPENDECTOMY      BONE MARROW BIOPSY Left 6/26/2024    Procedure: Biopsy-bone marrow;  Surgeon: Bridger Zapata MD;  Location: [facility] ENDO (4TH FLR);  Service: Oncology;  Laterality: Left;  6/24-pt knows to hold aspirin and eliquis as instructed by hem/onc, precall complete-Kpvt    CARDIOVERSION  6/11/2024    Procedure: Cardioversion;  Surgeon: Bronson Bowden MD;  Location: [facility] EP LAB;  Service: Cardiology;;    CATARACT EXTRACTION W/  INTRAOCULAR LENS IMPLANT Right 3/13/2024    Procedure: EXTRACTION, CATARACT, WITH IOL INSERTION;  Surgeon: Jennifer Palacio MD;  Location: [facility];  Service: Ophthalmology;  Laterality: Right;    CATARACT EXTRACTION W/   INTRAOCULAR LENS IMPLANT Left 4/10/2024    Procedure: EXTRACTION, CATARACT, WITH IOL INSERTION;  Surgeon: Jennifer Palacio MD;  Location: [facility] OR;  Service: Ophthalmology;  Laterality: Left;    COLONOSCOPY N/A 12/21/2020    Procedure: COLONOSCOPY;  Surgeon: Tico Bell MD;  Location: [facility] ENDO (4TH FLR);  Service: Endoscopy;  Laterality: N/A;  ok to hold eliquis per Dr. Valadez, see telephone encounter 11/13/2020-MS  covid test 12/18 [place]    ECHOCARDIOGRAM,TRANSESOPHAGEAL N/A 2/3/2025    Procedure: Transesophageal echo (FELICIANO) intra-procedure log documentation;  Surgeon: Grayson Lin III, MD;  Location: [facility] EP LAB;  Service: Cardiology;  Laterality: N/A;    KIDNEY TRANSPLANT      TRANSESOPHAGEAL ECHOCARDIOGRAPHY N/A 8/26/2019    Procedure: ECHOCARDIOGRAM, TRANSESOPHAGEAL;  Surgeon: Dolores Diagnostic Provider;  Location: [facility] EP LAB;  Service: Cardiology;  Laterality: N/A;    TRANSESOPHAGEAL ECHOCARDIOGRAPHY N/A 6/11/2024    Procedure: ECHOCARDIOGRAM, TRANSESOPHAGEAL;  Surgeon: Grayson Lin III, MD;  Location: [facility] EP LAB;  Service: Cardiology;  Laterality: N/A;    TRANSESOPHAGEAL ECHOCARDIOGRAPHY N/A 6/12/2025    Procedure: ECHOCARDIOGRAM, TRANSESOPHAGEAL;  Surgeon: Breezy Nguyen MD;  Location: [facility] EP LAB;  Service: Cardiology;  Laterality: N/A;    TREATMENT OF CARDIAC ARRHYTHMIA N/A 8/26/2019    Procedure: CARDIOVERSION;  Surgeon: Bronson Bowden MD;  Location: [facility] EP LAB;  Service: Cardiology;  Laterality: N/A;  AF, FELICIANO, DCCV, MAC, GP, 3 PREP    TREATMENT OF CARDIAC ARRHYTHMIA N/A 2/3/2025    Procedure: Cardioversion or Defibrillation;  Surgeon: Bronson Bowden MD;  Location: [facility] EP LAB;  Service: Cardiology;  Laterality: N/A;  AF, FELICIANO, DCCV, MAC, GP, 3 Prep     Family History   Problem Relation Name Age of Onset    Diabetes Mother      Hypertension Mother      Heart disease Mother          CAD with PCI    Heart disease Father      Cancer Brother 2         one sister with breast cancer     Hypertension Brother 2         one sister with HTN and borderline DM    Stroke Neg Hx      Kidney disease Neg Hx      Colon cancer Neg Hx      Esophageal cancer Neg Hx       Social History[1]  Review of Systems    Physical Exam     Initial Vitals [07/12/25 1932]   BP Pulse Resp Temp SpO2   (!) 205/93 69 18 98.1 °F (36.7 °C) 95 %      MAP       --         Physical Exam    Vitals reviewed.  Constitutional:   69-year-old  man, no acute distress noted   HENT:   Head: Normocephalic and atraumatic.   Multiple missing teeth without chary caries or swelling, mildly desiccated mucous membranes noted   Eyes: EOM are normal. Pupils are equal, round, and reactive to light.   Neck: No tracheal deviation present.   Cardiovascular:  Normal rate and regular rhythm.           Pulmonary/Chest: Breath sounds normal. No respiratory distress. He has no wheezes.   Abdominal: Abdomen is soft. He exhibits no distension. There is no abdominal tenderness. There is no rebound.   Musculoskeletal:         General: No edema. Normal range of motion.     Neurological: He is alert and oriented to person, place, and time. GCS score is 15. GCS eye subscore is 4. GCS verbal subscore is 5. GCS motor subscore is 6.   Ambulatory unassisted   Skin: Skin is warm and dry.   Diabetes monitor noted to left posterior upper arm; loop recorder noted to left mid clavicular rib margin at the level of T11 without surrounding erythema   Psychiatric: He has a normal mood and affect. His behavior is normal. Thought content normal.         ED Course   Critical Care    Date/Time: 7/12/2025 7:55 PM    Performed by: Chris Cox MD  Authorized by: Chris Cox MD  Direct patient critical care time: 15 minutes  Additional history critical care time: 5 minutes  Ordering / reviewing critical care time: 10 minutes  Documentation critical care time: 10 minutes  Consulting other physicians critical care time: 10 minutes  Total critical  care time (exclusive of procedural time) : 50 minutes  Critical care was necessary to treat or prevent imminent or life-threatening deterioration of the following conditions: cardiac failure.  Critical care was time spent personally by me on the following activities: development of treatment plan with patient or surrogate, discussions with consultants, interpretation of cardiac output measurements, evaluation of patient's response to treatment, examination of patient, obtaining history from patient or surrogate, ordering and performing treatments and interventions, ordering and review of laboratory studies, ordering and review of radiographic studies, pulse oximetry and review of old charts.        Labs Reviewed   COMPREHENSIVE METABOLIC PANEL - Abnormal       Result Value    Sodium 137      Potassium 4.3      Chloride 101      CO2 22 (*)     Glucose 168 (*)     BUN 68 (*)     Creatinine 3.9 (*)     Calcium 8.7      Protein Total 7.0      Albumin 3.1 (*)     Bilirubin Total 0.5      ALP 54      AST 23      ALT <5 (*)     Anion Gap 14      eGFR 16 (*)    URINALYSIS, REFLEX TO URINE CULTURE - Abnormal    Color, UA Colorless (*)     Appearance, UA Clear      pH, UA 8.0      Spec Grav UA 1.010      Protein, UA 2+ (*)     Glucose, UA 3+ (*)     Ketones, UA Negative      Bilirubin, UA Negative      Blood, UA Negative      Nitrites, UA Negative      Urobilinogen, UA Negative      Leukocyte Esterase, UA Negative     CBC WITH DIFFERENTIAL - Abnormal    WBC 7.54      RBC 4.11 (*)     HGB 8.9 (*)     HCT 30.2 (*)     MCV 74 (*)     MCH 21.7 (*)     MCHC 29.5 (*)     RDW 19.3 (*)     Platelet Count 136 (*)     MPV        Nucleated RBC 0      Neut % 47.9      Lymph % 28.0      Mono % 20.3 (*)     Eos % 2.8      Basophil % 0.7      Imm Grans % 0.3      Neut # 3.62      Lymph # 2.11      Mono # 1.53 (*)     Eos # 0.21      Baso # 0.05      Imm Grans # 0.02     TROPONIN I HIGH SENSITIVITY - Abnormal    Troponin High Sensitive 205  (*)    B-TYPE NATRIURETIC PEPTIDE - Abnormal    BNP 4,888 (*)    TROPONIN I HIGH SENSITIVITY - Abnormal    Troponin High Sensitive 224 (*)    BASIC METABOLIC PANEL - Abnormal    Sodium 135 (*)     Potassium 3.3 (*)     Chloride 101      CO2 22 (*)     Glucose 152 (*)     BUN 66 (*)     Creatinine 3.9 (*)     Calcium 8.2 (*)     Anion Gap 12      eGFR 16 (*)    TROPONIN I HIGH SENSITIVITY - Abnormal    Troponin High Sensitive 220 (*)    CBC WITH DIFFERENTIAL - Abnormal    WBC 6.36      RBC 3.97 (*)     HGB 8.5 (*)     HCT 30.4 (*)     MCV 77 (*)     MCH 21.4 (*)     MCHC 28.0 (*)     RDW 19.6 (*)     Platelet Count 118 (*)     MPV        Nucleated RBC 0      Neut % 53.1      Lymph % 22.5      Mono % 20.9 (*)     Eos % 2.4      Basophil % 0.6      Imm Grans % 0.5      Neut # 3.38      Lymph # 1.43      Mono # 1.33 (*)     Eos # 0.15      Baso # 0.04      Imm Grans # 0.03     TACROLIMUS LEVEL - Abnormal    Tacrolimus 3.3 (*)    OSMOLALITY, SERUM - Abnormal    Osmolality 313 (*)    HEMOGLOBIN A1C - Abnormal    Hemoglobin A1c 6.5 (*)     Estimated Average Glucose 140 (*)    POCT GLUCOSE - Abnormal    POCT Glucose 173 (*)    POCT GLUCOSE - Abnormal    POCT Glucose 272 (*)    LACTIC ACID, PLASMA - Normal    Lactic Acid Level 1.9      Narrative:     Falsely low lactic acid results can be found in samples containing >=13.0 mg/dL total bilirubin and/or >=3.5 mg/dL direct bilirubin.    TSH - Normal    TSH 1.684     MAGNESIUM - Normal    Magnesium  2.2     PHOSPHORUS - Normal    Phosphorus Level 3.3     SARS-COV2 (COVID) WITH FLU/RSV BY PCR - Normal    INFLUENZA A BY PCR Negative      INFLUENZA B BY PCR Negative      Respiratory Syncytial Virus PCR Negative      SARS-CoV-2 PCR Negative     MAGNESIUM - Normal    Magnesium  1.9     PHOSPHORUS - Normal    Phosphorus Level 3.1     PROCALCITONIN - Normal    Procalcitonin 0.17     UREA NITROGEN, URINE, RANDOM - Normal    Urine Urea Nitrogen 303     SODIUM, URINE, RANDOM - Normal     Urine Sodium 78      Narrative:     The random urine reference ranges provided were established   for 24 hour urine collections.  No reference ranges exist for  random urine specimens.  Correlate clinically.   CREATININE, URINE, RANDOM - Normal    Urine Creatinine 43.0     OSMOLALITY, URINE RANDOM - Normal    Urine Osmolality 323     RESPIRATORY INFECTION PANEL (PCR), NASOPHARYNGEAL    Respiratory Infection Panel Source Nasopharyngeal Swab      Adenovirus Not Detected      Coronavirus 229E, Common Cold Virus Not Detected      Coronavirus HKU1, Common Cold Virus Not Detected      Coronavirus NL63, Common Cold Virus Not Detected      Coronavirus OC43, Common Cold Virus Not Detected      SARS-CoV2 (COVID-19) Qualitative PCR Not Detected      Human Metapneumovirus Not Detected      Human Rhinovirus/Enterovirus Not Detected      Influenza A Not Detected      Influenza B Not Detected      Parainfluenza Virus 1 Not Detected      Parainfluenza Virus 2 Not Detected      Parainfluenza Virus 3 Not Detected      Parainfluenza Virus 4 Not Detected      Respiratory Syncytial Virus Not Detected      Bordetella Parapertussis (BW6401) Not Detected      Bordetella pertussis (ptxP) Not Detected      Chlamydia pneumoniae Not Detected      Mycoplasma pneumoniae Not Detected     CBC W/ AUTO DIFFERENTIAL    Narrative:     The following orders were created for panel order CBC auto differential.  Procedure                               Abnormality         Status                     ---------                               -----------         ------                     CBC with Differential[[id number]]       Abnormal            Final result                 Please view results for these tests on the individual orders.   URINALYSIS MICROSCOPIC    RBC, UA 1      WBC, UA <1      Bacteria, UA Rare      Yeast, UA None      Squamous Epithelial Cells, UA <1      Hyaline Casts, UA 0      Microscopic Comment       CBC W/ AUTO DIFFERENTIAL    Narrative:      The following orders were created for panel order CBC with Automated Differential.  Procedure                               Abnormality         Status                     ---------                               -----------         ------                     CBC with Differential[[id number]]       Abnormal            Final result                 Please view results for these tests on the individual orders.   EXTRA TUBES    Narrative:     The following orders were created for panel order EXTRA TUBES.  Procedure                               Abnormality         Status                     ---------                               -----------         ------                     Light Green Top Hold[[id number]]                            Final result                 Please view results for these tests on the individual orders.   LIGHT GREEN TOP HOLD    Extra Tube Hold for add-ons.     POCT GLUCOSE, HAND-HELD DEVICE   POCT GLUCOSE, HAND-HELD DEVICE   POCT GLUCOSE, HAND-HELD DEVICE   POCT GLUCOSE MONITORING CONTINUOUS     EKG Readings: (Independently Interpreted)   Initial Reading: No STEMI. Heart Rate: 65. Axis: Right Axis Deviation.   Competing pacemaker without chary evidence of P waves prior to each QRS, concerning for recurrent atrial fib; biphasic ST segment noted to V6 only     ECG Results              EKG 12-lead (Final result)        Collection Time Result Time QRS Duration [facility] QTC Calculation    07/13/25 01:47:52 07/13/25 19:26:54 118 426                     Final result by Interface, Lab In [facility] (07/13/25 19:27:01)                   Narrative:    Test Reason :    Vent. Rate :  78 BPM     Atrial Rate :  78 BPM     P-R Int :    ms          QRS Dur : 118 ms      QT Int : 374 ms       P-R-T Axes :    117  26 degrees    QTcB Int : 426 ms    NSR with first degree AVB  with premature ventricular or aberrantly  conducted complexes  Possible  Lateral infarct ,age undetermined  Abnormal ECG  When compared with ECG of  12-Jul-2025 21:50,  Nonspecific T wave abnormality no longer evident in Inferior leads  Confirmed by Tejas Carter ([id number]) on 7/13/2025 7:26:53 PM    Referred By: AAAREFERRAL SELF           Confirmed By: Tejas Carter                                     EKG 12-lead (Final result)        Collection Time Result Time QRS Duration OHS QTC Calculation    07/12/25 21:50:13 07/13/25 11:15:39 114 435                     Final result by Interface, Lab In [facility] (07/13/25 11:15:43)                   Narrative:    Test Reason :    Vent. Rate :  78 BPM     Atrial Rate : 416 BPM     P-R Int :    ms          QRS Dur : 114 ms      QT Int : 382 ms       P-R-T Axes :    113  42 degrees    QTcB Int : 435 ms    Likely  Atrial fibrillation  Right axis deviation  Nonspecific ST abnormality  Abnormal ECG  When compared with ECG of 12-Jul-2025 20:10,  T wave abnormalities more evident in the Inferior leads  Confirmed by Breezy Nguyen ([id number]) on 7/13/2025 11:15:34 AM    Referred By: AAAREFERRAL SELF           Confirmed By: Breezy Nguyen                                     EKG 12-lead (Final result)        Collection Time Result Time QRS Duration OHS QTC Calculation    07/12/25 20:10:06 07/13/25 07:54:14 112 409                     Final result by Interface, Lab In [facility] (07/13/25 07:54:21)                   Narrative:    Test Reason : R42,    Vent. Rate :  65 BPM     Atrial Rate :  70 BPM     P-R Int :    ms          QRS Dur : 112 ms      QT Int : 394 ms       P-R-T Axes :    109  46 degrees    QTcB Int : 409 ms    SB with PACs  Rightward axis  Low septal forces ; Abnormal R wave progression in the precordial leads  -Cannot exclude Anterior infarct (cited on or before 12-Jul-2025)  Abnormal ECG  When compared with ECG of 12-Jul-2025 19:52,  No significant change was found    Confirmed by Tejas Carter ([id number]) on 7/13/2025 7:54:11 AM    Referred By: NIDIA SELF           Confirmed By: Tejas Carter                                  Imaging  Results              US Retroperitoneal Complete (Final result)  Result time 07/14/25 00:33:54      Final result by Neftaly Wood DO (07/14/25 00:33:54)                   Impression:      1. Solid mass in the left kidney superior pole, concerning for a neoplastic process such as renal cell carcinoma.  2. Simple and minimally complex cysts in the right kidney as above.  3. Medical renal disease.      Electronically signed by: Neftaly Wood  Date:    07/14/2025  Time:    00:33               Narrative:    EXAMINATION:  US RETROPERITONEAL COMPLETE    CLINICAL HISTORY:  renal mass seen on ct;    TECHNIQUE:  Ultrasound of the kidneys and urinary bladder was performed including color flow and Doppler evaluation of the kidneys.    COMPARISON:  Ultrasound from 07/13/2025. CT abdomen and pelvis from 07/13/2015.    FINDINGS:  Right kidney: The right kidney measures 9.7 cm. There is loss of corticomedullary distinction. Resistive index measures 1.0.  There is a midpole cyst measuring 1.0 x 1.0 x 1.0 cm, containing a 3 mm septum.  There is a simple cyst in the lower pole measuring 1.1 x 1.1 x 1.0 cm.  No renal stone. No hydronephrosis.    Left kidney: The left kidney measures 9.0 cm. There is loss of corticomedullary distinction. Resistive index measures 0.63.  There is a vascular mass in the left kidney superior pole measuring 3.0 x 2.6 x 2.6 cm.  No renal stone. No hydronephrosis.    The bladder is partially distended at the time of scanning and has an unremarkable appearance.                                       MRI Brain Without Contrast (Final result)  Result time 07/13/25 14:07:39      Final result by Mateo Kumar MD (07/13/25 14:07:39)                   Impression:      No acute intracranial findings.  No evidence of acute ischemia or recent infarction.      Electronically signed by: Mateo Kumar  Date:    07/13/2025  Time:    14:07               Narrative:    EXAMINATION:  MRI BRAIN WITHOUT  CONTRAST    CLINICAL HISTORY:  Dizziness, non-specific;Dizziness, persistent/recurrent, cardiac or vascular cause suspected;    TECHNIQUE:  Multiplanar multisequence MR imaging of the brain was performed without contrast.    COMPARISON:  CT head performed 07/12/2025.  MRI of the brain performed 12/07/2024.    FINDINGS:  Parenchyma: There is no restricted diffusion to suggest acute or subacute ischemic infarct.Generalized pattern parenchymal volume loss.  Remote lacunar type infarct right thalamus and right centrum semiovale, the latter of which has demonstrate anticipated maturation since 12/07/2024 MRI, at which time point the infarct was recent.  Additional nonspecific areas of T2/FLAIR hyperintense signal in the white matter may reflect sequela of chronic small vessel ischemic disease.    Additional comments: Few small nonspecific chronic hemorrhages are noted in the left frontal subcortical region, present previously on 12/07/2024 MRI.    Ventricles: Normal.    Flow voids: The normal major intracranial arterial flow voids are visualized.    Sinuses and mastoid air cells: Relatively minimal paranasal sinus mucosal thickening with possible small retention cysts.    Orbits: Status post bilateral lens replacements.    Midline structures: The pituitary and craniocervical junction are normal.    Marrow: Normal                                          CT Chest Abdomen Pelvis Without Contrast (XPD) (Final result)  Result time 07/13/25 14:22:57      Final result by Govind Krishna MD (07/13/25 14:22:57)                   Impression:      Multifocal ground-glass and nodular airspace opacities, with more confluent appearance in the posterior right upper lobe.  Findings are concerning for multifocal pneumonia versus aspiration.  Interval improvement in left lung consolidations.  Stable bilateral small pleural effusions.    Ill-defined lesion of the left renal upper pole with indeterminate attenuation.  Limited evaluation on  this noncontrast study.  This is concerning for solid mass such as renal cell carcinoma.  Further evaluation with dedicated retroperitoneal ultrasound is recommended.    Postoperative change of right lower quadrant renal transplant.    Additional findings as above.    This report was flagged in Epic as abnormal.    Electronically signed by resident: Gabriel Peck  Date:    07/13/2025  Time:    12:03    Electronically signed by: Govind Krishna MD  Date:    07/13/2025  Time:    14:22               Narrative:    EXAMINATION:  CT CHEST ABDOMEN PELVIS WITHOUT CONTRAST(XPD)    CLINICAL HISTORY:  Sepsis;    TECHNIQUE:  Low dose axial images, sagittal and coronal reformations were obtained from the thoracic inlet to the pubic symphyses without intravenous contrast. Oral contrast was not administered.  Maximum intensity projections of the chest obtained.    COMPARISON:  CT chest 05/22/2025, CT renal stone study 03/15/2019    FINDINGS:  Evaluation is limited by extensive streak artifact due to the patient's arms overlying the field of view and lack of intravenous contrast.    Thoracic soft tissues: Bilateral gynecomastia.    Aorta: Normal caliber.  Moderate calcific atherosclerosis.    Heart: Normal size.  No effusion.  Multi-vessel coronary artery calcifications.  Aortic annular calcifications.    Marlen/Mediastinum: No significant lymphadenopathy    Lungs: Trachea and bronchi are patent.  Apical predominant mild paraseptal emphysema.  Small bilateral pleural effusions, right greater than left, similar to prior.    In the right lung there is multifocal ground-glass and nodular airspace opacities, with confluent appearance in the posterior right upper lobe.  Some nodules are arranged in tree-in-bud distribution.    In the left lung there is near complete resolution left upper lobe consolidative airspace opacity with minimal patchy residual ground-glass.  Bibasilar atelectasis.    Liver: Normal size and attenuation.  Unremarkable  noncontrast appearance.    Gallbladder: Contracted.  No calcified gallstones or pericholecystic inflammatory change    Bile Ducts: No evidence of dilated ducts.    Pancreas: Fatty atrophic change.  No mass or peripancreatic stranding.    Spleen: Unremarkable.    Adrenals: Unremarkable.    Kidneys/ Ureters: Postoperative change of right lower quadrant renal transplant.  Native kidneys are atrophic.  Nonspecific perinephric stranding.  Simple cyst on the right as well as other cystic appearing lesions with attenuation slightly greater than simple fluid.  Heterogeneous, ill-defined lesion in the left upper pole, new from prior, measuring approximately 3.2 cm.  Vascular calcifications.  No nephrolithiasis or hydronephrosis.  No ureteral dilatation.    Unremarkable appearance of the right lower quadrant renal allograft.    Bladder: No evidence of wall thickening.    Reproductive organs: Prostate is enlarged.    GI Tract/Mesentery: No evidence of bowel obstruction or inflammation.  Moderate volume of stool in the colon.  Scattered colonic diverticuli.  No evidence of diverticulitis.  Appendix is absent.    Peritoneal Space: No free air or free fluid.    Retroperitoneum: No pathologically enlarged lymph nodes.    Abdominal wall: Fat containing umbilical hernia.    Vasculature: Moderate to severe aortoiliac calcific atherosclerosis.  No aneurysm.    Bones: Degenerative changes in the spine.  No acute fracture.  No suspicious osseous lesion.                                       US Transplant Kidney With Doppler (Final result)  Result time 07/13/25 07:25:47      Final result by Shen Child MD (07/13/25 07:25:47)                   Impression:      Mildly elevated renal resistive indices, nonspecific but could reflect medical renal disease, rejection or drug toxicity.    Cardiac arrhythmia    Electronically signed by resident: Fan Ruby  Date:    07/13/2025  Time:    07:17    Electronically signed by: Shen  MD Danyell  Date:    07/13/2025  Time:    07:25               Narrative:    EXAMINATION:  US TRANSPLANT KIDNEY WITH DOPPLER    CLINICAL HISTORY:  Patient with kidney transplant presenting with PHUONG;    TECHNIQUE:  Real time gray scale and doppler ultrasound was performed over the patient's renal allograft.    COMPARISON:  Ultrasound transplant kidney Doppler 05/07/2025    FINDINGS:  Renal allograft in the right lower quadrant.  The allograft measures 11.8 cm. Diminished perfusion. No hydronephrosis.    No fluid collections.    Vasculature:    Resistive indexes:    Interlobar: 0.74, previously 0.76    Segmental upper: 0.79, previously 0.84    Segmental middle: 0.78, previously 0.72    Segmental lower: 0.73, previously 0.73    Main renal artery peak systolic velocity: 173cm/sec with normal waveform.  Previously 188 cm/sec    Renal artery/iliac ratio: 0.9.    The main renal vein is patent.    Subtle cardiac arrhythmia observed.                                       X-Ray Chest AP Portable (Final result)  Result time 07/12/25 23:49:08      Final result by Javier Eden MD (07/12/25 23:49:08)                   Impression:      Interval resolution small left effusion.    Cardiomegaly with interstitial edema, similar compared to prior.      Electronically signed by: Javier Eden MD  Date:    07/12/2025  Time:    23:49               Narrative:    EXAMINATION:  XR CHEST AP PORTABLE    CLINICAL HISTORY:  Unspecified atrial fibrillation    TECHNIQUE:  Single frontal view of the chest was performed.    COMPARISON:  06/03/2025.    FINDINGS:  Cardiomegaly with interstitial edema, similar compared to prior.    Interval resolution small left effusion.    Heart and lungs otherwise appear similar to prior when allowing for differences in technique and positioning.                                       CT Head Without Contrast (Final result)  Result time 07/12/25 23:47:08      Final result by Duong Jones MD (07/12/25  23:47:08)                   Impression:      Allowing for artifacts and pre-existing findings, CT demonstrates no acute intracranial abnormality.    Correlate clinically.  If there remains strong clinical suspicion for acute intracranial pathology, recommendations would include brain MRI and/or short-term follow-up head CT.      Electronically signed by: Duong Jones  Date:    07/12/2025  Time:    23:47               Narrative:    EXAMINATION:  CT HEAD WITHOUT CONTRAST    CLINICAL HISTORY:  Headache, new or worsening (Age >= 50y);    Additional history, from the current ED provider note: Worsening dizziness    TECHNIQUE:  Low dose axial images were obtained through the head.  Coronal and sagittal reformations were also performed. Contrast was not administered.    COMPARISON:  Noncontrast head CT 02/20/2025.    FINDINGS:  Artifacts related to beam hardening and or motion degrade portions the scan.    Allowing for the artifacts, there is no scan evidence of hyperattenuating acute intracranial hemorrhage, large vascular territory brain infarct, discrete intracranial mass or mass effect, pathologic extra-axial fluid collection, midline shift, brain herniation, pneumocephalus, acute hydrocephalus, or diffuse brain edema.    There is some diffuse cerebral and cerebellar parenchymal volume loss, with a proportionate degree of presumed ex vacuo prominence of the ventricles, cisterns, sulci, and fissures.  These findings remain similar to the comparison study.  The transverse diameter of the 3rd ventricle remains approximately 11 mm, similar to the comparison study.  No apical cortical sulcal effacement is demonstrated.    There are some intracranial atherosclerotic calcifications.    The hyperdense appearance of the basilar artery and adjacent brainstem on a single axial image (series 2, image 8) is likely artifactual.    There remain some low-attenuation changes in the cerebral white matter, most apparent about the  occipital horns and atria of the lateral ventricles.  These findings are similar to the comparison study and could be related to chronic small vessel ischemia.  Other white matter pathology is not fully excluded.    There remains a small 8 mm hypodensity in the right centrum semiovale, possibly representing an old infarct.  This was already present on 02/20/2025.    Partially empty sella.    No depressed calvarial fracture.    Mastoid air cells and visualized portions of the paranasal sinuses remain well-aerated.    Orbits demonstrate an old fracture of the left medial orbital wall through which some extraconal left orbital fat protrudes.  The left medial rectus muscle again straddles the fracture defect.  A small soft tissue attenuation structure, possibly a portion of the left superior oblique muscle, minimally protrudes into the fracture defect.  In some patients, these imaging findings could be associated with clinical signs or symptoms of extraocular muscle entrapment.     imaging: No contributory findings.                                       Medications   sodium chloride 0.9% flush 10 mL (has no administration in time range)   albuterol-ipratropium 2.5 mg-0.5 mg/3 mL nebulizer solution 3 mL (has no administration in time range)   melatonin tablet 6 mg (has no administration in time range)   ondansetron disintegrating tablet 8 mg (has no administration in time range)   polyethylene glycol packet 17 g (17 g Oral Not Given 7/14/25 0900)   acetaminophen tablet 650 mg (650 mg Oral Given 7/13/25 0200)   aluminum-magnesium hydroxide-simethicone 200-200-20 mg/5 mL suspension 30 mL (has no administration in time range)   acetaminophen tablet 650 mg (has no administration in time range)   naloxone 0.4 mg/mL injection 0.02 mg (has no administration in time range)   dextrose 50% injection 12.5 g (has no administration in time range)   dextrose 50% injection 25 g (has no administration in time range)   sodium  chloride 0.9% flush 10 mL (has no administration in time range)   furosemide injection 60 mg (60 mg Intravenous Given 7/14/25 0851)   aspirin chewable tablet 81 mg (81 mg Oral Given 7/14/25 0848)   atorvastatin tablet 80 mg (80 mg Oral Given 7/14/25 0851)   famotidine tablet 20 mg (has no administration in time range)   ferrous sulfate tablet 1 each (1 each Oral Given 7/14/25 0851)   meclizine tablet 25 mg (has no administration in time range)   predniSONE tablet 5 mg (5 mg Oral Given 7/14/25 0851)   rivaroxaban tablet 15 mg (15 mg Oral Given 7/13/25 1710)   tacrolimus capsule 1 mg (1 mg Oral Given 7/14/25 0852)   insulin regular in 0.9 % NaCl 100 unit/100 mL (1 unit/mL) infusion (0.4 Units/hr Intravenous Handoff 7/14/25 0725)   insulin aspart U-100 pen 0-5 Units (2 Units Subcutaneous Given 7/14/25 1334)   glucagon (human recombinant) injection 1 mg (has no administration in time range)   glucose chewable tablet 16 g (has no administration in time range)   glucose chewable tablet 24 g (has no administration in time range)   hydrALAZINE tablet 100 mg (100 mg Oral Given 7/14/25 1340)   isosorbide mononitrate 24 hr tablet 60 mg (60 mg Oral Given 7/14/25 0848)   doxazosin tablet 16 mg (has no administration in time range)   amoxicillin-clavulanate 500-125mg per tablet 500 mg (has no administration in time range)   doxycycline tablet 100 mg (has no administration in time range)   insulin aspart U-100 pen 5 Units (has no administration in time range)   hydrALAZINE injection 10 mg (10 mg Intravenous Given 7/12/25 2123)   furosemide injection 80 mg (80 mg Intravenous Given 7/12/25 2205)   hydrALAZINE injection 20 mg (20 mg Intravenous Given 7/12/25 2205)   acetaminophen tablet 500 mg (500 mg Oral Given 7/13/25 0303)   vancomycin 2 g in 0.9% sodium chloride 500 mL IVPB (0 mg Intravenous Stopped 7/13/25 0605)   potassium chloride SA CR tablet 40 mEq (40 mEq Oral Given 7/13/25 0437)   cloNIDine tablet 0.3 mg (0.3 mg Oral Given  7/13/25 1425)   hydrALAZINE tablet 50 mg (50 mg Oral Given 7/13/25 1424)   hydrALAZINE injection 10 mg (10 mg Intravenous Given 7/14/25 0530)   doxazosin tablet 8 mg (8 mg Oral Given 7/14/25 0849)   potassium chloride SA CR tablet 40 mEq (40 mEq Oral Given 7/14/25 1417)     Medical Decision Making  Differential diagnosis:  Recurrent dizziness, PHUONG on CKD, electrolyte derangement, lethal arrhythmia, subacute stroke    Amount and/or Complexity of Data Reviewed  Labs: ordered. Decision-making details documented in ED Course.  Radiology: ordered. Decision-making details documented in ED Course.  ECG/medicine tests: ordered and independent interpretation performed. Decision-making details documented in ED Course.    Risk  Prescription drug management.  Decision regarding hospitalization.              Attending Attestation:             Attending ED Notes:   Initial evaluation completed, EKG reveals findings of competing pacemaker, potential recurrent atrial fibrillation.  Patient also noted to exhibit significant elevation of blood pressure prompting IV antihypertensive therapy.  Secondary to end of shift, laboratory and radiographic and crepitation as well as disposition has been transferred to Dr. Mejía, please see her accompanying documentation for further details.      ED Course as of 07/14/25 1645   Sat Jul 12, 2025   2155 Troponin I High Sensitivity(!): 205 [CH]   2155 BNP(!): 4,888 [CH]      ED Course User Index  [CH] Sandra Mejía MD                               Clinical Impression:  Final diagnoses:  [R42] Dizziness  [I48.91] A-fib  [R07.9] Chest pain  [I50.9] CHF (congestive heart failure)  [I45.5] Sinus pause          ED Disposition Condition    Admit                       [1]   Social History  Tobacco Use    Smoking status: Former     Current packs/day: 0.00     Average packs/day: 0.5 packs/day for 32.0 years (16.0 ttl pk-yrs)     Types: Cigarettes     Start date: 11/28/1984     Quit date:  2016     Years since quittin.6    Smokeless tobacco: Never   Vaping Use    Vaping status: Never Used   Substance Use Topics    Alcohol use: Not Currently    Drug use: No        Chris Cox MD  25 [id number]

## 2025-07-13 NOTE — PROVIDER PROGRESS NOTES - EMERGENCY DEPT.
ED Physician Hand-off Note:    ED Course: I assumed care of patient from off-going ED physician, Dr. Cox.  Briefly, patient presented for dizziness, recurrent afib.    At the time of signout plan was pending labs, and BP control.           Disposition: ***    Patient comfortable with ***. Patient counseled regarding exam, results, diagnosis, treatment, and plan.      Impression: ***  Final diagnoses:  [R42] Dizziness

## 2025-07-13 NOTE — ASSESSMENT & PLAN NOTE
Patient's most recent potassium results are listed below.   Recent Labs     07/12/25 2021 07/13/25  0311   K 4.3 3.3*     Plan  - Replete potassium per protocol  - Monitor potassium Daily  - Patient's hypokalemia is stable

## 2025-07-13 NOTE — ASSESSMENT & PLAN NOTE
Unclear etiology, patient without symptoms to guide possible cause  - febrile to 101.4F on admission  - CBC without leukocytosis  - CMP with PHUONG  - UA non infectious  - lactic acid and procal wnl  - RIP negative   - BCX ordered  - CXR without consolidation  - CT head without acute process  - given immunocompromised state start empiric vanc, ceftriaxone and flagyl  - low threshold to consult ID

## 2025-07-13 NOTE — ASSESSMENT & PLAN NOTE
Patient has a current diagnosis of hypertensive urgency (without evidence of end organ damage) which is controlled.  Latest blood pressure and vitals reviewed-   Temp:  [98.1 °F (36.7 °C)-101.5 °F (38.6 °C)]   Pulse:  []   Resp:  [17-21]   BP: (170-248)/()   SpO2:  [92 %-99 %] .   Patient currently off IV antihypertensives.   Home meds for hypertension were reviewed and noted below.   Hypertension Medications                   isosorbide-hydrALAZINE 20-37.5 mg (BIDIL) 20-37.5 mg Tab Take 2 tablets by mouth 3 (three) times daily.         metoprolol tartrate (LOPRESSOR) 25 MG tablet Take 1 tablet (25 mg total) by mouth 2 (two) times daily.             Will aim for controlled BP reduction by medications noted above. Monitor and mitigate end organ damage as indicated.

## 2025-07-13 NOTE — SUBJECTIVE & OBJECTIVE
Past Medical History:   Diagnosis Date    Anxiety 07/29/2014    Arthritis     atrial fibrillation     History of cardioversion    Bilateral diabetic retinopathy 2017    Cardioembolic stroke 12/07/2024    almost completely resolved - very mild left hand weakness    CKD (chronic kidney disease)     in transplanted kidney    Colon polyps 2014    Depression - situational 07/29/2014    Diabetes type 2 2000    since 2000.  c/b neuropathy, CKD    Encounter for blood transfusion     History of hepatitis C, s/p successful Rx w/ SVR24 - 9/2017 07/29/2014    Genotype 1a, treatment naive 10/2014 liver biopsy - grade 1 / stage 1 Completed Harvoni w/ SVR    Hyperlipidemia 07/29/2014    Hypertension     Pancreatitis     S/P cadaveric kidney transplant 11/5/2016. ESRD secondary to HTN/DMII 11/05/2016    Stroke 2024       Past Surgical History:   Procedure Laterality Date    ABLATION OF ARRHYTHMOGENIC FOCUS FOR ATRIAL FIBRILLATION N/A 6/11/2024    Procedure: Ablation atrial fibrillation;  Surgeon: Bronson Bowden MD;  Location: [facility] EP LAB;  Service: Cardiology;  Laterality: N/A;  AF, FELICIANO (cx if SR), PVI, RFA, Carto, Gen, GP, 3 Prep    ABLATION OF ARRHYTHMOGENIC FOCUS FOR ATRIAL FIBRILLATION N/A 6/12/2025    Procedure: Ablation atrial fibrillation;  Surgeon: Bronson Bowden MD;  Location: [facility] EP LAB;  Service: Cardiology;  Laterality: N/A;  AF, FELICIANO (h/o CVA), redo PVI, CTI, RFA, Carto, Gen, GP, 3 Prep *insulin pump*    ABLATION, ATRIAL FLUTTER, ATYPICAL  6/12/2025    Procedure: Ablation, Atrial Flutter, Atypical;  Surgeon: Bronson Bowden MD;  Location: [facility] EP LAB;  Service: Cardiology;;    ABLATION, ATRIAL FLUTTER, TYPICAL  6/11/2024    Procedure: Ablation, Atrial Flutter, Typical;  Surgeon: Bronson Bowden MD;  Location: [facility] EP LAB;  Service: Cardiology;;    ABLATION, ATRIAL FLUTTER, TYPICAL N/A 6/12/2025    Procedure: Ablation, Atrial Flutter, Typical;  Surgeon: Bronson Bowden MD;  Location: [facility] EP LAB;  Service:  Cardiology;  Laterality: N/A;    APPENDECTOMY      BONE MARROW BIOPSY Left 6/26/2024    Procedure: Biopsy-bone marrow;  Surgeon: Bridger Zapata MD;  Location: [facility] ENDO (4TH FLR);  Service: Oncology;  Laterality: Left;  6/24-pt knows to hold aspirin and eliquis as instructed by hem/onc, precall complete-Kpvt    CARDIOVERSION  6/11/2024    Procedure: Cardioversion;  Surgeon: Bronson Bowden MD;  Location: [facility] EP LAB;  Service: Cardiology;;    CATARACT EXTRACTION W/  INTRAOCULAR LENS IMPLANT Right 3/13/2024    Procedure: EXTRACTION, CATARACT, WITH IOL INSERTION;  Surgeon: Jennifer Palacio MD;  Location: [facility] OR;  Service: Ophthalmology;  Laterality: Right;    CATARACT EXTRACTION W/  INTRAOCULAR LENS IMPLANT Left 4/10/2024    Procedure: EXTRACTION, CATARACT, WITH IOL INSERTION;  Surgeon: Jennifer Palacio MD;  Location: [facility] OR;  Service: Ophthalmology;  Laterality: Left;    COLONOSCOPY N/A 12/21/2020    Procedure: COLONOSCOPY;  Surgeon: Tico Bell MD;  Location: [facility] ENDO (4TH FLR);  Service: Endoscopy;  Laterality: N/A;  ok to hold eliquis per Dr. Valadez, see telephone encounter 11/13/2020-MS  covid test 12/18 [place]    ECHOCARDIOGRAM,TRANSESOPHAGEAL N/A 2/3/2025    Procedure: Transesophageal echo (FELICIANO) intra-procedure log documentation;  Surgeon: Grayson Lin III, MD;  Location: [facility] EP LAB;  Service: Cardiology;  Laterality: N/A;    KIDNEY TRANSPLANT      TRANSESOPHAGEAL ECHOCARDIOGRAPHY N/A 8/26/2019    Procedure: ECHOCARDIOGRAM, TRANSESOPHAGEAL;  Surgeon: [facility];  Location: [facility] EP LAB;  Service: Cardiology;  Laterality: N/A;    TRANSESOPHAGEAL ECHOCARDIOGRAPHY N/A 6/11/2024    Procedure: ECHOCARDIOGRAM, TRANSESOPHAGEAL;  Surgeon: Grayson Lin III, MD;  Location: [facility] EP LAB;  Service: Cardiology;  Laterality: N/A;    TRANSESOPHAGEAL ECHOCARDIOGRAPHY N/A 6/12/2025    Procedure: ECHOCARDIOGRAM, TRANSESOPHAGEAL;  Surgeon: Breezy Nguyen MD;  Location: [facility] EP LAB;  " Service: Cardiology;  Laterality: N/A;    TREATMENT OF CARDIAC ARRHYTHMIA N/A 8/26/2019    Procedure: CARDIOVERSION;  Surgeon: Bronson Bowden MD;  Location: [facility] EP LAB;  Service: Cardiology;  Laterality: N/A;  AF, FELICIANO, DCCV, MAC, GP, 3 PREP    TREATMENT OF CARDIAC ARRHYTHMIA N/A 2/3/2025    Procedure: Cardioversion or Defibrillation;  Surgeon: Bronson Bowden MD;  Location: [facility] EP LAB;  Service: Cardiology;  Laterality: N/A;  AF, FELICIANO, DCCV, MAC, GP, 3 Prep       Review of patient's allergies indicates:   Allergen Reactions    Nifedipine Other (See Comments)     Gingival hyperplasia       Medications:  (Not in a hospital admission)    Antibiotics (From admission, onward)      Start     Stop Route Frequency Ordered    07/13/25 0430  cefTRIAXone injection 1 g         -- IV Every 24 hours (non-standard times) 07/13/25 0325    07/13/25 0430  metronidazole IVPB 500 mg         -- IV Every 8 hours (non-standard times) 07/13/25 0325 07/13/25 0425  vancomycin - pharmacy to dose  (vancomycin IVPB (PEDS and ADULTS))        Placed in "And" Linked Group    -- IV pharmacy to manage frequency 07/13/25 0325          Antifungals (From admission, onward)      None          Antivirals (From admission, onward)      None             Immunization History   Administered Date(s) Administered    COVID-19, MRNA, LN-S, PF (Pfizer) (Purple Cap) 04/01/2021, 04/22/2021, 10/30/2021, 10/30/2021    COVID-19, mRNA, LNP-S, PF (Moderna) Ages 12+ 11/08/2023    COVID-19, mRNA, LNP-S, PF, naldo-sucrose, 30 mcg/0.3 mL (Pfizer Ages 12+) 09/19/2024    COVID-19, mRNA, LNP-S, bivalent booster, PF (PFIZER OMICRON) 01/04/2023    Hepatitis A, Adult 09/18/2014    Hepatitis B 09/18/2014, 10/21/2014    Hepatitis B, Adult 06/19/2000, 07/24/2000, 01/16/2001    Hepatitis B, Dialysis, 3 Dose 09/18/2014, 10/21/2014    Influenza 10/01/2017    Influenza (FLUAD) - Quadrivalent - Adjuvanted - PF *Preferred* (65+) 11/08/2023    Influenza - Quadrivalent 09/26/2019, " 2019    Influenza - Quadrivalent - PF *Preferred* (6 months and older) 09/15/2017, 2018, 2018, 2020, 2020, 10/10/2022    Influenza - Trivalent - Afluria, Fluzone MDV 10/01/2015, 2016, 2016, 10/09/2021    Influenza - Trivalent - Fluad - Adjuvanted - PF (65 years and older 2024    Influenza - Trivalent - Flucelvax - PF 08/10/2014, 08/10/2014    Influenza Split 10/09/2021    Pneumococcal Conjugate - 13 Valent 2014    Pneumococcal Conjugate - 20 Valent 2023    Pneumococcal Polysaccharide - 23 Valent 2014    RSVpreF (Arexvy) 2024    Tdap 2000, 10/21/2014, 09/15/2017    Zoster 2014    Zoster Recombinant 2018, 2018, 10/30/2019       Family History       Problem Relation (Age of Onset)    Cancer Brother    Diabetes Mother    Heart disease Mother, Father    Hypertension Mother, Brother          Social History     Socioeconomic History    Marital status:    Occupational History     Employer: [employer]   Tobacco Use    Smoking status: Former     Current packs/day: 0.00     Average packs/day: 0.5 packs/day for 32.0 years (16.0 ttl pk-yrs)     Types: Cigarettes     Start date: 1984     Quit date: 2016     Years since quittin.6    Smokeless tobacco: Never   Vaping Use    Vaping status: Never Used   Substance and Sexual Activity    Alcohol use: Not Currently    Drug use: No    Sexual activity: Yes     Partners: Female   Social History Narrative     for 14 years     for 38 years    2 children ages 41, 42     Social Drivers of Health     Financial Resource Strain: Low Risk  (2025)    Overall Financial Resource Strain (CARDIA)     Difficulty of Paying Living Expenses: Not hard at all   Food Insecurity: No Food Insecurity (2025)    Hunger Vital Sign     Worried About Running Out of Food in the Last Year: Never true     Ran Out of Food in the Last Year: Never true    Transportation Needs: No Transportation Needs (6/30/2025)    PRAPARE - Transportation     Lack of Transportation (Medical): No     Lack of Transportation (Non-Medical): No   Physical Activity: Inactive (6/30/2025)    Exercise Vital Sign     Days of Exercise per Week: 0 days     Minutes of Exercise per Session: 0 min   Stress: No Stress Concern Present (6/3/2025)    [employer] [place] [employer] - Occupational Stress Questionnaire     Feeling of Stress : Only a little   Housing Stability: Low Risk  (6/30/2025)    Housing Stability Vital Sign     Unable to Pay for Housing in the Last Year: No     Number of Times Moved in the Last Year: 0     Homeless in the Last Year: No     Review of Systems   Constitutional:  Positive for fever.   Respiratory:  Negative for shortness of breath.    Cardiovascular:  Negative for chest pain.   Gastrointestinal:  Negative for abdominal pain, nausea and vomiting.   Genitourinary: Negative.    Musculoskeletal:  Negative for back pain.   Neurological:  Positive for light-headedness.     Objective:     Vital Signs (Most Recent):  Temp: 98.5 °F (36.9 °C) (07/13/25 0943)  Pulse: (!) 57 (07/13/25 1500)  Resp: 20 (07/13/25 1500)  BP: (!) 205/91 (07/13/25 1500)  SpO2: 99 % (07/13/25 1500) Vital Signs (24h Range):  Temp:  [98.1 °F (36.7 °C)-101.5 °F (38.6 °C)] 98.5 °F (36.9 °C)  Pulse:  [] 57  Resp:  [17-96] 20  SpO2:  [90 %-99 %] 99 %  BP: (167-248)/() 205/91     Weight: 95.3 kg (210 lb)  Body mass index is 27.71 kg/m².    Estimated Creatinine Clearance: 20.8 mL/min (A) (based on SCr of 3.9 mg/dL (H)).     Physical Exam  Vitals and nursing note reviewed.   Constitutional:       General: He is not in acute distress.     Appearance: He is well-developed. He is not ill-appearing or toxic-appearing.   HENT:      Head: Normocephalic and atraumatic.   Eyes:      Extraocular Movements: Extraocular movements intact.   Cardiovascular:      Rate and Rhythm: Normal rate and regular  rhythm.      Heart sounds: Normal heart sounds.   Pulmonary:      Effort: Pulmonary effort is normal. No respiratory distress.      Breath sounds: No wheezing or rales.   Abdominal:      General: Bowel sounds are normal. There is no distension.      Tenderness: There is no abdominal tenderness.   Musculoskeletal:      Right lower leg: Edema present.      Left lower leg: Edema present.   Skin:     General: Skin is warm and dry.      Findings: No rash.   Neurological:      Mental Status: He is alert and oriented to person, place, and time.          Significant Labs: CBC:   Recent Labs   Lab 07/12/25 2021 07/13/25 0311   WBC 7.54 6.36   HGB 8.9* 8.5*   HCT 30.2* 30.4*   * 118*     CMP:   Recent Labs   Lab 07/12/25 2021 07/13/25 0311    135*   K 4.3 3.3*    101   CO2 22* 22*   * 152*   BUN 68* 66*   CREATININE 3.9* 3.9*   CALCIUM 8.7 8.2*   PROT 7.0  --    ALBUMIN 3.1*  --    BILITOT 0.5  --    ALKPHOS 54  --    AST 23  --    ALT <5*  --    ANIONGAP 14 12       Significant Imaging:     US Transplant Kidney With Doppler  Order: [id number]   Status: Final result       Next appt: 07/16/2025 at 10:30 AM in Lab (LAB, APPOINTMENT [place])    Test Result Released: Yes (not seen)    0 Result Notes  Details    Reading Physician Reading Date Result Priority   Shen Child MD  [phone]  7/13/2025 STAT   Fan Ruby MD  [phone]  7/13/2025      Narrative & Impression  EXAMINATION:  US TRANSPLANT KIDNEY WITH DOPPLER     CLINICAL HISTORY:  Patient with kidney transplant presenting with PHUONG;     TECHNIQUE:  Real time gray scale and doppler ultrasound was performed over the patient's renal allograft.     COMPARISON:  Ultrasound transplant kidney Doppler 05/07/2025     FINDINGS:  Renal allograft in the right lower quadrant.  The allograft measures 11.8 cm. Diminished perfusion. No hydronephrosis.     No fluid collections.     Vasculature:     Resistive indexes:     Interlobar: 0.74,  previously 0.76     Segmental upper: 0.79, previously 0.84     Segmental middle: 0.78, previously 0.72     Segmental lower: 0.73, previously 0.73     Main renal artery peak systolic velocity: 173cm/sec with normal waveform.  Previously 188 cm/sec     Renal artery/iliac ratio: 0.9.     The main renal vein is patent.     Subtle cardiac arrhythmia observed.     Impression:     Mildly elevated renal resistive indices, nonspecific but could reflect medical renal disease, rejection or drug toxicity.     Cardiac arrhythmia     Electronically signed by resident: Fan Ruby  Date:                                            07/13/2025  Time:                                           07:17     Electronically signed by:Shen Child MD  Date:                                            07/13/2025  Time:                                           07:25        Exam Ended: 07/13/25 06:03 CDT Last Resulted: 07/13/25 07:25 CDT

## 2025-07-13 NOTE — ED TRIAGE NOTES
Patient arrived to ED for complaints of dizziness. Patient stated he has been experiencing dizziness for one week. Denies any falls or lost of consciousness.

## 2025-07-13 NOTE — ASSESSMENT & PLAN NOTE
Anemia is likely due to chronic disease due to Chronic Kidney Disease. Most recent hemoglobin and hematocrit are listed below.  Recent Labs     07/12/25 2021 07/13/25  0311   HGB 8.9* 8.5*   HCT 30.2* 30.4*     Plan  - Monitor serial CBC: Daily  - Transfuse PRBC if patient becomes hemodynamically unstable, symptomatic or H/H drops below 7/21.  - Patient has not received any PRBC transfusions to date  - Patient's anemia is currently stable

## 2025-07-13 NOTE — ASSESSMENT & PLAN NOTE
Endocrinology consulted for BG management.   BG goal 140-180     - Not a candidate for insulin pump therapy at this time. Patient reports he does not have supplies and no one is available at this time to bring home a replacement Omnipod or Dexcom.    - Review insulin requirements over the past two weeks and averaged requirements for basal and prandial insulin needs. \    - Transition drip at 0.4 units/hr with step-down parameters.   - Novolog (aspart) insulin 2 Units SQ TIDWM and prn for BG excursions [facility] SSI (150/50).  - BG checks /HS/0200  - Hypoglycemia protocol in place    ** Please notify Endocrine for any change and/or advance in diet**  ** Please call Endocrine for any BG related issues **    Discharge Planning:   TBD. Please notify endocrinology prior to discharge.

## 2025-07-13 NOTE — ASSESSMENT & PLAN NOTE
The patients 3 most recent labs are listed below.  Recent Labs     07/12/25 2021 07/13/25  0311   * 118*     Plan  - Will transfuse if platelet count is <10k.  - daily CBC

## 2025-07-13 NOTE — NURSING
Patient admitted to unit from ED. Alert and oriented x 4. No apparent distress. Able to ambulate from stretcher to bed unassisted. Insulin gtts infusing to L PIV at 0.4units/hr. Verified rate and order with ED RN. Accucheck 278. Scheduled and SSI Novalog administered. BP improved from ED at 177/81. Connected to bedside telemetry monitor, noted to be bradycardic. Hr 45-55 on monitor, 48 via manual radial check. Patient asymptomatic. Michael MCGOWAN notified. Instructed to closely monitor patient and hold evening Coreg if HR<55.

## 2025-07-13 NOTE — ED NOTES
Pt remains on cardiac monitor, continuous pulse ox, cycling blood pressures. Side rails up x2, call bell in reach, bed in low position with brake engaged.  AAOx4, skin w/d/ resp wnl. Moist cough w/ clear mucus.  Ambulating to void- tolerated well. Offers no c/o's at this time. IV site asymptomatic.  at bedside.    LOC: The patient is awake and alert; oriented x 3 and speaking appropriately.  APPEARANCE: Patient resting comfortably, patient is clean and well groomed  SKIN: warm and dry, normal skin turgor & moist mucus membranes, skin intact, no breakdown noted.  MUSCULOSKELETAL: Patient moving all extremities well, no obvious swelling or deformities noted  RESPIRATORY: Airway is open and patent, moist productive cough w/ clear mucus. ; respirations are spontaneous, normal effort and rate  CARDIAC: Patient has a normal rate, no peripheral edema noted, capillary refill < 3 seconds; No complaints of chest pain   ABDOMEN: Soft and non tender to palpation, no distention noted.

## 2025-07-13 NOTE — ASSESSMENT & PLAN NOTE
"- Patient's FSGs are controlled on current hypoglycemics.   - Last A1c reviewed-   Lab Results   Component Value Date    HGBA1C 6.4 (H) 06/03/2025     - Most recent fingerstick glucose reviewed- No results for input(s): "POCTGLUCOSE" in the last 24 hours.  - Current correctional scale Low  Maintain anti-hyperglycemic dose as follows-   Antihyperglycemics (From admission, onward)      Start     Stop Route Frequency Ordered    07/13/25 0140  insulin aspart U-100 pen 0-5 Units         -- SubQ Before meals & nightly PRN 07/13/25 0040          - endocrine consulted for insulin pump management   "

## 2025-07-13 NOTE — ASSESSMENT & PLAN NOTE
- continue home tacro 1 mg BID  - KTM consulted  - renal transplant US ordered  - daily BMP, daily tacro levels

## 2025-07-13 NOTE — CONSULTS
"[facility] - Emergency Dept  Endocrinology  Diabetes Consult Note    Consult Requested by: Celine Rodriguez MD   Reason for admit: Acute on chronic diastolic congestive heart failure    HISTORY OF PRESENT ILLNESS:  Reason for Consult: Management of T2DM, Hyperglycemia      Diabetes diagnosis year: 2000      Home Diabetes Medications:    OMNIPOD 5  0.6 u/hr mn-0600 , 9p-mn, 0.7 u/hr 6a-9p   Target 120-130 mg/dl   Iob 3 hours  Max bolus 20 units   Max basal 2 u/hr   Isf 40 mn-mn  ICR 1 to 7.5      Presets:  Snack: 4 units (28g)  Small Meal: 8 units (56g)  Medium Meal: 12 units (90g)  Large Meal: 15 units (114g)  Super Large Meal: 18 units (130g)     How often checking glucose at home? >4 x day   BG readings on regimen: 150s  Hypoglycemia on the regimen?  No  Missed doses on regimen?  No     Diabetes Complications include:     Hyperglycemia     Complicating diabetes co morbidities:   S/p Kidney tx 2016         HPI:Ravi Zamorano is a 69 y.o. male s/p kidney transplant in 2016 on tacro, CKD 4, HFpEF, T2DM with insulin pump use, afib on xarelto and AICD being admitted to hospital medicine for manangement of PHUONG on CKD and CHF exacerbation. Patient reports increased feelings of lightheadedness/dizziness with leaning forward the past several days. Endorses associated unsteady gait and bilateral "hand twitching". In the ED hypertensive to 230/98, febrile to Tmax 101.4 F. Endocrine consulted to manage hyperglycemia, type 2 diabetes, and insulin pump therapy  in the context of the inpatient setting.     Lab Results   Component Value Date    HGBA1C 6.5 (H) 07/13/2025           Interval HPI:   Overnight events: No acute events overnight. Patient in room ED 32/32. Blood glucose stable. BG at goal on current insulin regimen (Home Insulin Pump- has been off since yesterday evening). Steroid use- None.    Renal function- Abnormal- Creatinine 3.8    Vasopressors-  None       Endocrine will continue to follow and manage insulin " orders inpatient.       Diet Low Sodium (2 gm) Fluid - 1500mL     Eatin%  Nausea: No  Hypoglycemia and intervention: No  Fever: No  TPN and/or TF: No    PMH, PSH, FH, SH updated and reviewed     ROS:  Review of Systems   Constitutional:  Negative for unexpected weight change.   Eyes:  Negative for visual disturbance.   Respiratory:  Negative for cough.    Cardiovascular:  Negative for chest pain.   Gastrointestinal:  Negative for nausea and vomiting.   Endocrine: Negative for polydipsia and polyuria.   Musculoskeletal:  Negative for back pain.   Skin:  Negative for rash.   Neurological:  Positive for syncope and light-headedness.   Psychiatric/Behavioral:  Positive for confusion. Negative for agitation and dysphoric mood.        Current Medications and/or Treatments Impacting Glycemic Control  Immunotherapy:    Immunosuppressants           Stop Route Frequency     tacrolimus capsule 1 mg         -- Oral 2 times daily          Steroids:   Hormones (From admission, onward)      Start     Stop Route Frequency Ordered    25 0900  predniSONE tablet 5 mg         -- Oral Daily 25 0543    25 0138  melatonin tablet 6 mg         -- Oral Nightly PRN 25 0040          Pressors:    Autonomic Drugs (From admission, onward)      None          Hyperglycemia/Diabetes Medications:   Antihyperglycemics (From admission, onward)      Start     Stop Route Frequency Ordered    25 0140  insulin aspart U-100 pen 0-5 Units         -- SubQ Before meals & nightly PRN 25 0040             PHYSICAL EXAMINATION:  Vitals:    25 1200   BP: (!) 175/77   Pulse: (!) 59   Resp:    Temp:      Body mass index is 27.71 kg/m².     Physical Exam  Constitutional:       Appearance: He is well-developed.   HENT:      Head: Normocephalic.   Eyes:      Conjunctiva/sclera: Conjunctivae normal.   Pulmonary:      Effort: Pulmonary effort is normal.   Musculoskeletal:         General: Normal range of motion.   Skin:      "General: Skin is warm.      Findings: No rash.   Neurological:      Mental Status: He is alert and oriented to person, place, and time.            Labs Reviewed and Include   Recent Labs   Lab 07/12/25 2021 07/13/25  0311   * 152*   CALCIUM 8.7 8.2*   ALBUMIN 3.1*  --    PROT 7.0  --     135*   K 4.3 3.3*   CO2 22* 22*    101   BUN 68* 66*   CREATININE 3.9* 3.9*   ALKPHOS 54  --    ALT <5*  --    AST 23  --    BILITOT 0.5  --      Lab Results   Component Value Date    WBC 6.36 07/13/2025    HGB 8.5 (L) 07/13/2025    HCT 30.4 (L) 07/13/2025    MCV 77 (L) 07/13/2025     (L) 07/13/2025     Recent Labs   Lab 07/12/25 2021   TSH 1.684     Lab Results   Component Value Date    HGBA1C 6.5 (H) 07/13/2025       Nutritional status:   Body mass index is 27.71 kg/m².  Lab Results   Component Value Date    ALBUMIN 3.1 (L) 07/12/2025    ALBUMIN 3.4 (L) 07/07/2025    ALBUMIN 3.1 (L) 06/27/2025     No results found for: "PREALBUMIN"    Estimated Creatinine Clearance: 20.8 mL/min (A) (based on SCr of 3.9 mg/dL (H)).    Accu-Checks  Recent Labs     07/13/25  0811   POCTGLUCOSE 173*        ASSESSMENT and PLAN    Cardiac/Vascular  * Acute on chronic diastolic congestive heart failure  Managed per primary team  Avoid hypoglycemia        Hyperlipidemia associated with type 2 diabetes mellitus  On statin therapy per ADA guidelines.   Atorvastatin 80mg daily        Endocrine  Insulin pump in place  Not a candidate for pump therapy at this time.   Patient does not have supplies.       Type 2 diabetes mellitus with stage 4 chronic kidney disease, with long-term current use of insulin  Endocrinology consulted for BG management.   BG goal 140-180     - Not a candidate for insulin pump therapy at this time. Patient reports he does not have supplies and no one is available at this time to bring home a replacement Omnipod or Dexcom.    - Review insulin requirements over the past two weeks and averaged requirements for " basal and prandial insulin needs. \    - Transition drip at 0.4 units/hr with step-down parameters.   - Novolog (aspart) insulin 2 Units SQ TIDWM and prn for BG excursions [facility] SSI (150/50).  - BG checks /HS/0200  - Hypoglycemia protocol in place    ** Please notify Endocrine for any change and/or advance in diet**  ** Please call Endocrine for any BG related issues **    Discharge Planning:   TBD. Please notify endocrinology prior to discharge.            Plan discussed with patient, family, and RN at bedside.        Cecil Villalpando, DNP, FNP  Endocrinology  [facility] - Emergency Dept

## 2025-07-13 NOTE — HPI
"Ravi Zamorano is a 69 y.o. male s/p kidney transplant in 2016 on tacro, CKD 4, HFpEF, T2DM with insulin pump use, afib on xarelto and AICD being admitted to hospital medicine for manangement of PHUONG on CKD and CHF exacerbation.  Patient reports increased feelings of lightheadedness/dizziness with leaning forward the past several days. Endorses associated unsteady gait and bilateral "hand twitching". During the encounter he seems to have a difficult time explaining his symptoms or their duration. Denies any recent illness, cough, congestion, N/V/D, abdominal pain, dysuria or hematuria. Febrile to 101.4 F on admission, when asked if having recent fevers he says he often has fevers to 101 F at home but is unable to tell me the most recent time this has happened. Sates he does not know what his current home medication regimen is.       In ED: hypertensive to 230/98, febrile to Tmax 101.4 F, remaining vitals stable. CBC without leukocytosis, hgb 8.5 which is baseline. CMP notable for PHUONG on CKD (creatinine 3.9, most recent baseline ~3.1). BNP elevated to 4,888 (elevated from most recent 3,164 6 days ago). Lactic acid and procal wnl. UA non infectious. CT head without acute intracranial abnormality. CXR with cardiomegaly with interstitial edema. Given 1 0 mg IV hydralazine x1, 20 mg IV hydralazine x1 and 80 mg IV lasix in the ED. Admitted to hospital medicine for further management.   "

## 2025-07-13 NOTE — CONSULTS
"[facility] - Emergency Dept  Infectious Disease  Consult Note    Patient Name: Ravi Zamorano  MRN: [id number]  Admission Date: 7/12/2025  Hospital Length of Stay: 0 days  Attending Physician: Celine Rodriguez MD  Primary Care Provider: Mima Mack MD     Isolation Status: No active isolations    Patient information was obtained from patient, past medical records, ER records, and primary team.      Inpatient consult to Infectious Diseases  Consult performed by: Sylvie Bae DO  Consult ordered by: Celine Rodriguez MD        Assessment/Plan:     ID  Febrile illness, acute  68 y/o M h/o kidney transplant in 2016, admitted with suspected CAP in 4/2025 then admitted again 5/2025 with pneumonia and felt better with antibiotics. Consulted for fevers with concern for CAP in transplant pt.  On admit he was febrile to 101.5, RIP negative, cultures pending, PCT WNL, CXR without new findings.     - continue ceftriaxone and flagyl  - stop vancomycin  - f/u CTAP  - f/u blood clx        Thank you for your consult. I will follow-up with patient. Please contact us if you have any additional questions.    Sylvie Bae DO  Infectious Disease  [facility] - Emergency Dept    Subjective:     Principal Problem: Acute on chronic diastolic congestive heart failure    HPI: Ravi Zamorano is a 69-year-old man s/p kidney transplant in 2016 on tacrolimus, CKD stage 4, HFpEF, type 2 DM, afib on xarelto and AICD who originally presented to the ED for evaluation of feeling "off-balanced" while walking over the last 2 days. He was found to be hypertensive and febrile to 101.5F on admission. BNP and Cr elevated from baseline. He was admitted for PHUONG on CKD and CHF exacerbation. Of note, patient was admitted with suspected CAP in 4/2025, then 5/2025, and treated with antibiotics with improvement. Patient otherwise denies hip or back pain, dysuria, cough, chest pain, shortness of breath, abdominal pain, n/v, recent sick " "contact.    Infectious Disease consulted for "Fever, possible CAP Transplant pt."    Past Medical History:   Diagnosis Date    Anxiety 07/29/2014    Arthritis     atrial fibrillation     History of cardioversion    Bilateral diabetic retinopathy 2017    Cardioembolic stroke 12/07/2024    almost completely resolved - very mild left hand weakness    CKD (chronic kidney disease)     in transplanted kidney    Colon polyps 2014    Depression - situational 07/29/2014    Diabetes type 2 2000    since 2000.  c/b neuropathy, CKD    Encounter for blood transfusion     History of hepatitis C, s/p successful Rx w/ SVR24 - 9/2017 07/29/2014    Genotype 1a, treatment naive 10/2014 liver biopsy - grade 1 / stage 1 Completed Harvoni w/ SVR    Hyperlipidemia 07/29/2014    Hypertension     Pancreatitis     S/P cadaveric kidney transplant 11/5/2016. ESRD secondary to HTN/DMII 11/05/2016    Stroke 2024       Past Surgical History:   Procedure Laterality Date    ABLATION OF ARRHYTHMOGENIC FOCUS FOR ATRIAL FIBRILLATION N/A 6/11/2024    Procedure: Ablation atrial fibrillation;  Surgeon: Bronson Bowden MD;  Location: [facility] EP LAB;  Service: Cardiology;  Laterality: N/A;  AF, FELICIANO (cx if SR), PVI, RFA, Carto, Gen, GP, 3 Prep    ABLATION OF ARRHYTHMOGENIC FOCUS FOR ATRIAL FIBRILLATION N/A 6/12/2025    Procedure: Ablation atrial fibrillation;  Surgeon: Bronson Bowden MD;  Location: [facility] EP LAB;  Service: Cardiology;  Laterality: N/A;  AF, FELICIANO (h/o CVA), redo PVI, CTI, RFA, Carto, Gen, GP, 3 Prep *insulin pump*    ABLATION, ATRIAL FLUTTER, ATYPICAL  6/12/2025    Procedure: Ablation, Atrial Flutter, Atypical;  Surgeon: Bronson Bowden MD;  Location: [facility] EP LAB;  Service: Cardiology;;    ABLATION, ATRIAL FLUTTER, TYPICAL  6/11/2024    Procedure: Ablation, Atrial Flutter, Typical;  Surgeon: Bronson Bowden MD;  Location: [facility] EP LAB;  Service: Cardiology;;    ABLATION, ATRIAL FLUTTER, TYPICAL N/A 6/12/2025    Procedure: Ablation, Atrial " Nancie, Daisy;  Surgeon: Bronson Bowden MD;  Location: [facility] EP LAB;  Service: Cardiology;  Laterality: N/A;    APPENDECTOMY      BONE MARROW BIOPSY Left 6/26/2024    Procedure: Biopsy-bone marrow;  Surgeon: Bridger Zapata MD;  Location: [facility] ENDO (4TH FLR);  Service: Oncology;  Laterality: Left;  6/24-pt knows to hold aspirin and eliquis as instructed by hem/onc, precall complete-Kpvt    CARDIOVERSION  6/11/2024    Procedure: Cardioversion;  Surgeon: Bronson Bowden MD;  Location: [facility] EP LAB;  Service: Cardiology;;    CATARACT EXTRACTION W/  INTRAOCULAR LENS IMPLANT Right 3/13/2024    Procedure: EXTRACTION, CATARACT, WITH IOL INSERTION;  Surgeon: Jennifer Palacio MD;  Location: [facility] OR;  Service: Ophthalmology;  Laterality: Right;    CATARACT EXTRACTION W/  INTRAOCULAR LENS IMPLANT Left 4/10/2024    Procedure: EXTRACTION, CATARACT, WITH IOL INSERTION;  Surgeon: Jennifer Palacio MD;  Location: [facility] OR;  Service: Ophthalmology;  Laterality: Left;    COLONOSCOPY N/A 12/21/2020    Procedure: COLONOSCOPY;  Surgeon: Tico Bell MD;  Location: [facility] ENDO (4TH FLR);  Service: Endoscopy;  Laterality: N/A;  ok to hold eliquis per Dr. Valadez, see telephone encounter 11/13/2020-MS  covid test 12/18 [place]    ECHOCARDIOGRAM,TRANSESOPHAGEAL N/A 2/3/2025    Procedure: Transesophageal echo (FELICIANO) intra-procedure log documentation;  Surgeon: Grayson Lin III, MD;  Location: [facility] EP LAB;  Service: Cardiology;  Laterality: N/A;    KIDNEY TRANSPLANT      TRANSESOPHAGEAL ECHOCARDIOGRAPHY N/A 8/26/2019    Procedure: ECHOCARDIOGRAM, TRANSESOPHAGEAL;  Surgeon: [facility] Diagnostic Provider;  Location: [facility] EP LAB;  Service: Cardiology;  Laterality: N/A;    TRANSESOPHAGEAL ECHOCARDIOGRAPHY N/A 6/11/2024    Procedure: ECHOCARDIOGRAM, TRANSESOPHAGEAL;  Surgeon: Grayson Lin III, MD;  Location: [facility] EP LAB;  Service: Cardiology;  Laterality: N/A;    TRANSESOPHAGEAL ECHOCARDIOGRAPHY N/A 6/12/2025    Procedure:  "ECHOCARDIOGRAM, TRANSESOPHAGEAL;  Surgeon: Breezy Nguyen MD;  Location: [facility] EP LAB;  Service: Cardiology;  Laterality: N/A;    TREATMENT OF CARDIAC ARRHYTHMIA N/A 8/26/2019    Procedure: CARDIOVERSION;  Surgeon: Bronson Bowden MD;  Location: [facility] EP LAB;  Service: Cardiology;  Laterality: N/A;  AF, FELICIANO, DCCV, MAC, GP, 3 PREP    TREATMENT OF CARDIAC ARRHYTHMIA N/A 2/3/2025    Procedure: Cardioversion or Defibrillation;  Surgeon: Bronson Bowden MD;  Location: [facility] EP LAB;  Service: Cardiology;  Laterality: N/A;  AF, FELICIANO, DCCV, MAC, GP, 3 Prep       Review of patient's allergies indicates:   Allergen Reactions    Nifedipine Other (See Comments)     Gingival hyperplasia       Medications:  (Not in a hospital admission)    Antibiotics (From admission, onward)      Start     Stop Route Frequency Ordered    07/13/25 0430  cefTRIAXone injection 1 g         -- IV Every 24 hours (non-standard times) 07/13/25 0325    07/13/25 0430  metronidazole IVPB 500 mg         -- IV Every 8 hours (non-standard times) 07/13/25 0325    07/13/25 0425  vancomycin - pharmacy to dose  (vancomycin IVPB (PEDS and ADULTS))        Placed in "And" Linked Group    -- IV pharmacy to manage frequency 07/13/25 0325          Antifungals (From admission, onward)      None          Antivirals (From admission, onward)      None             Immunization History   Administered Date(s) Administered    COVID-19, MRNA, LN-S, PF (Pfizer) (Purple Cap) 04/01/2021, 04/22/2021, 10/30/2021, 10/30/2021    COVID-19, mRNA, LNP-S, PF (Moderna) Ages 12+ 11/08/2023    COVID-19, mRNA, LNP-S, PF, naldo-sucrose, 30 mcg/0.3 mL (Pfizer Ages 12+) 09/19/2024    COVID-19, mRNA, LNP-S, bivalent booster, PF (PFIZER OMICRON) 01/04/2023    Hepatitis A, Adult 09/18/2014    Hepatitis B 09/18/2014, 10/21/2014    Hepatitis B, Adult 06/19/2000, 07/24/2000, 01/16/2001    Hepatitis B, Dialysis, 3 Dose 09/18/2014, 10/21/2014    Influenza 10/01/2017    Influenza (FLUAD) - Quadrivalent " - Adjuvanted - PF *Preferred* (65+) 2023    Influenza - Quadrivalent 2019, 2019    Influenza - Quadrivalent - PF *Preferred* (6 months and older) 09/15/2017, 2018, 2018, 2020, 2020, 10/10/2022    Influenza - Trivalent - Afluria, Fluzone MDV 10/01/2015, 2016, 2016, 10/09/2021    Influenza - Trivalent - Fluad - Adjuvanted - PF (65 years and older 2024    Influenza - Trivalent - Flucelvax - PF 08/10/2014, 08/10/2014    Influenza Split 10/09/2021    Pneumococcal Conjugate - 13 Valent 2014    Pneumococcal Conjugate - 20 Valent 2023    Pneumococcal Polysaccharide - 23 Valent 2014    RSVpreF (Arexvy) 2024    Tdap 2000, 10/21/2014, 09/15/2017    Zoster 2014    Zoster Recombinant 2018, 2018, 10/30/2019       Family History       Problem Relation (Age of Onset)    Cancer Brother    Diabetes Mother    Heart disease Mother, Father    Hypertension Mother, Brother          Social History     Socioeconomic History    Marital status:    Occupational History     Employer: [employer]   Tobacco Use    Smoking status: Former     Current packs/day: 0.00     Average packs/day: 0.5 packs/day for 32.0 years (16.0 ttl pk-yrs)     Types: Cigarettes     Start date: 1984     Quit date: 2016     Years since quittin.6    Smokeless tobacco: Never   Vaping Use    Vaping status: Never Used   Substance and Sexual Activity    Alcohol use: Not Currently    Drug use: No    Sexual activity: Yes     Partners: Female   Social History Narrative     for 14 years     for 38 years    2 children ages 41, 42     Social Drivers of Health     Financial Resource Strain: Low Risk  (2025)    Overall Financial Resource Strain (CARDIA)     Difficulty of Paying Living Expenses: Not hard at all   Food Insecurity: No Food Insecurity (2025)    Hunger Vital Sign     Worried About Running Out of Food in the  Last Year: Never true     Ran Out of Food in the Last Year: Never true   Transportation Needs: No Transportation Needs (6/30/2025)    PRAPARE - Transportation     Lack of Transportation (Medical): No     Lack of Transportation (Non-Medical): No   Physical Activity: Inactive (6/30/2025)    Exercise Vital Sign     Days of Exercise per Week: 0 days     Minutes of Exercise per Session: 0 min   Stress: No Stress Concern Present (6/3/2025)    [employer] - Occupational Stress Questionnaire     Feeling of Stress : Only a little   Housing Stability: Low Risk  (6/30/2025)    Housing Stability Vital Sign     Unable to Pay for Housing in the Last Year: No     Number of Times Moved in the Last Year: 0     Homeless in the Last Year: No     Review of Systems   Constitutional:  Positive for fever.   Respiratory:  Negative for shortness of breath.    Cardiovascular:  Negative for chest pain.   Gastrointestinal:  Negative for abdominal pain, nausea and vomiting.   Genitourinary: Negative.    Musculoskeletal:  Negative for back pain.   Neurological:  Positive for light-headedness.     Objective:     Vital Signs (Most Recent):  Temp: 98.5 °F (36.9 °C) (07/13/25 0943)  Pulse: (!) 57 (07/13/25 1500)  Resp: 20 (07/13/25 1500)  BP: (!) 205/91 (07/13/25 1500)  SpO2: 99 % (07/13/25 1500) Vital Signs (24h Range):  Temp:  [98.1 °F (36.7 °C)-101.5 °F (38.6 °C)] 98.5 °F (36.9 °C)  Pulse:  [] 57  Resp:  [17-96] 20  SpO2:  [90 %-99 %] 99 %  BP: (167-248)/() 205/91     Weight: 95.3 kg (210 lb)  Body mass index is 27.71 kg/m².    Estimated Creatinine Clearance: 20.8 mL/min (A) (based on SCr of 3.9 mg/dL (H)).     Physical Exam  Vitals and nursing note reviewed.   Constitutional:       General: He is not in acute distress.     Appearance: He is well-developed. He is not ill-appearing or toxic-appearing.   HENT:      Head: Normocephalic and atraumatic.   Eyes:      Extraocular Movements: Extraocular movements  intact.   Cardiovascular:      Rate and Rhythm: Normal rate and regular rhythm.      Heart sounds: Normal heart sounds.   Pulmonary:      Effort: Pulmonary effort is normal. No respiratory distress.      Breath sounds: No wheezing or rales.   Abdominal:      General: Bowel sounds are normal. There is no distension.      Tenderness: There is no abdominal tenderness.   Musculoskeletal:      Right lower leg: Edema present.      Left lower leg: Edema present.   Skin:     General: Skin is warm and dry.      Findings: No rash.   Neurological:      Mental Status: He is alert and oriented to person, place, and time.          Significant Labs: CBC:   Recent Labs   Lab 07/12/25 2021 07/13/25 0311   WBC 7.54 6.36   HGB 8.9* 8.5*   HCT 30.2* 30.4*   * 118*     CMP:   Recent Labs   Lab 07/12/25 2021 07/13/25 0311    135*   K 4.3 3.3*    101   CO2 22* 22*   * 152*   BUN 68* 66*   CREATININE 3.9* 3.9*   CALCIUM 8.7 8.2*   PROT 7.0  --    ALBUMIN 3.1*  --    BILITOT 0.5  --    ALKPHOS 54  --    AST 23  --    ALT <5*  --    ANIONGAP 14 12       Significant Imaging:     US Transplant Kidney With Doppler  Order: [id number]   Status: Final result       Next appt: 07/16/2025 at 10:30 AM in Lab (LAB, APPOINTMENT Tina)    Test Result Released: Yes (not seen)    0 Result Notes  Details    Reading Physician Reading Date Result Priority   Shen Child MD  [phone]  7/13/2025 STAT   Fan Ruby MD  [phone]  7/13/2025      Narrative & Impression  EXAMINATION:  US TRANSPLANT KIDNEY WITH DOPPLER     CLINICAL HISTORY:  Patient with kidney transplant presenting with PHUONG;     TECHNIQUE:  Real time gray scale and doppler ultrasound was performed over the patient's renal allograft.     COMPARISON:  Ultrasound transplant kidney Doppler 05/07/2025     FINDINGS:  Renal allograft in the right lower quadrant.  The allograft measures 11.8 cm. Diminished perfusion. No hydronephrosis.     No fluid  collections.     Vasculature:     Resistive indexes:     Interlobar: 0.74, previously 0.76     Segmental upper: 0.79, previously 0.84     Segmental middle: 0.78, previously 0.72     Segmental lower: 0.73, previously 0.73     Main renal artery peak systolic velocity: 173cm/sec with normal waveform.  Previously 188 cm/sec     Renal artery/iliac ratio: 0.9.     The main renal vein is patent.     Subtle cardiac arrhythmia observed.     Impression:     Mildly elevated renal resistive indices, nonspecific but could reflect medical renal disease, rejection or drug toxicity.     Cardiac arrhythmia     Electronically signed by resident: Fan Ruby  Date:                                            07/13/2025  Time:                                           07:17     Electronically signed by:Shen Child MD  Date:                                            07/13/2025  Time:                                           07:25        Exam Ended: 07/13/25 06:03 CDT Last Resulted: 07/13/25 07:25 CDT

## 2025-07-13 NOTE — ASSESSMENT & PLAN NOTE
Patient has paroxysmal (<7 days) atrial fibrillation. Patient is currently in atrial fibrillation. [id number] Score: 3. The patients heart rate in the last 24 hours is as follows:  Pulse  Min: 65  Max: 105  Antiarrhythmics  metoprolol tartrate (LOPRESSOR) tablet 25 mg, 2 times daily, Oral  Anticoagulants  rivaroxaban tablet 15 mg, With dinner, Oral    Plan  - Replete lytes with a goal of K>4, Mg >2  - Patient is anticoagulated, see medications listed above.  - Patient's afib is currently controlled  - afib recurrence after recent ablation, consider EP consult if symptomatic

## 2025-07-13 NOTE — HPI
"Ravi Zamorano is a 69-year-old man s/p kidney transplant in 2016 on tacrolimus, CKD stage 4, HFpEF, type 2 DM, afib on xarelto and AICD who originally presented to the ED for evaluation of feeling "off-balanced" while walking over the last 2 days. He was found to be hypertensive and febrile to 101.5F on admission. BNP and Cr elevated from baseline. He was admitted for PHUONG on CKD and CHF exacerbation. Of note, patient was admitted with suspected CAP in 4/2025, then 5/2025, and treated with antibiotics with improvement. Patient otherwise denies hip or back pain, dysuria, cough, chest pain, shortness of breath, abdominal pain, n/v, recent sick contact.    Infectious Disease consulted for "Fever, possible CAP Transplant pt."  "

## 2025-07-13 NOTE — PLAN OF CARE
Hospital Medicine Plan of Care Note    Admission H&P dated earlier this morning reviewed, and agree with assessment and plan as documented. Pt seen and examined this morning on rounds, GILMAR. Labs concerning for worsening PHUONG on CKD. Troponin elevated but downtrending so will hold off on further monitoring. Concern for Cardiorenal syndrome as well as hypertensive emergency, did have improvement with management in the ED. Continue BP medications. Increased Metoprolol and started Doxazosin. Can consider changing Metoprolol to Coreg, however, noted recent switch to Metoprolol. Allergy to nifedipine. Continue IV diuresis, pt reporting good output. He does not appear short of breath at this time. Asterixis less prominent this AM. Edema present but pt does not feel this significantly greater than baseline. Nephrology consulted and KTM consulted. Transplant ID consulted given high risk and fevers with unclear source. Will proceed with CT C/A/P but hold off on contrast given worsening renal function at this time. Continue antibiotics at this time. Cultures pending. MRI pending for dizziness workup.     Celine Rodriguez MD  Attending Physician  Department of Hospital Medicine  7/13/2025

## 2025-07-13 NOTE — ASSESSMENT & PLAN NOTE
- CHF pathway initiated   - CXR revealed Cardiomegaly with interstitial edema,   - BNP 4,888 / troponin 205>>220  - EKG with atrial fibrillation recurrence (recent ablation 04/25)  - last echo showing ejection fraction of 50 - 55%.   - repeat TTE ordered   -  is not lasix naive. Begin diuresis with 60 mg IV lasix BID  - strict Is/Os   - cardiac, low sodium diet  - fluid restrict 1.5L while inpatient  - daily CMP   - monitor on cardiac telemetry   - supplemental O2 as needed

## 2025-07-13 NOTE — ASSESSMENT & PLAN NOTE
The patient's most recent sodium results are listed below.  Recent Labs     07/12/25 2021 07/13/25  0311    135*     Plan  - Correct the sodium by 4-6mEq in 24 hours.   - Obtain the following studies: TSH, T4.  - Monitor sodium Daily.   - Patient hyponatremia is stable

## 2025-07-13 NOTE — ED NOTES
Telemetry Verification   Patient placed on Telemetry Box  Verified on ED monitor  Box 2841   Monitor Tech  [place]   Rate 51   Rhythm S Franky

## 2025-07-13 NOTE — ASSESSMENT & PLAN NOTE
Suspect etiology cardiorenal.    - Cr 3.9.  Baseline Cr 3.1  - possible signs of uremia? Asterixis on exam  - UA unremarkable   - renal transplant US pending   - nephrology consulted, appreciate recommendations   - hold ACE-I or ARB while renal function dynamic  - Monitor UOP and follow serial BMP   - Adjust drugs to GFR/CrCL; avoid nephrotoxic drugs   -  Estimated Creatinine Clearance: 20.8 mL/min (A) (based on SCr of 3.9 mg/dL (H)).   - Monitor electrolytes, phos levels daily

## 2025-07-14 ENCOUNTER — TELEPHONE (OUTPATIENT)
Dept: NEPHROLOGY | Facility: CLINIC | Age: 70
End: 2025-07-14
Payer: MEDICARE

## 2025-07-14 PROBLEM — N28.89 RENAL MASS: Status: ACTIVE | Noted: 2025-07-14

## 2025-07-14 PROBLEM — J15.9 COMMUNITY ACQUIRED BACTERIAL PNEUMONIA: Status: ACTIVE | Noted: 2025-07-13

## 2025-07-14 PROBLEM — E87.1 HYPONATREMIA: Status: RESOLVED | Noted: 2025-02-20 | Resolved: 2025-07-14

## 2025-07-14 PROBLEM — I16.0 HYPERTENSIVE URGENCY: Status: RESOLVED | Noted: 2019-03-15 | Resolved: 2025-07-14

## 2025-07-14 PROBLEM — J18.9 PNEUMONIA: Status: ACTIVE | Noted: 2025-07-14

## 2025-07-14 LAB
ABSOLUTE EOSINOPHIL (OHS): 0.35 K/UL
ABSOLUTE MONOCYTE (OHS): 0.97 K/UL (ref 0.3–1)
ABSOLUTE NEUTROPHIL COUNT (OHS): 3.32 K/UL (ref 1.8–7.7)
ANION GAP (OHS): 12 MMOL/L (ref 8–16)
BASOPHILS # BLD AUTO: 0.03 K/UL
BASOPHILS NFR BLD AUTO: 0.5 %
BUN SERPL-MCNC: 61 MG/DL (ref 8–23)
CALCIUM SERPL-MCNC: 8.5 MG/DL (ref 8.7–10.5)
CHLORIDE SERPL-SCNC: 101 MMOL/L (ref 95–110)
CO2 SERPL-SCNC: 24 MMOL/L (ref 23–29)
CREAT SERPL-MCNC: 3.5 MG/DL (ref 0.5–1.4)
CYTOMEGALOVIRUS DNA, QUAL (OHS): NOT DETECTED
ERYTHROCYTE [DISTWIDTH] IN BLOOD BY AUTOMATED COUNT: 19.1 % (ref 11.5–14.5)
GFR SERPLBLD CREATININE-BSD FMLA CKD-EPI: 18 ML/MIN/1.73/M2
GLUCOSE SERPL-MCNC: 133 MG/DL (ref 70–110)
HCT VFR BLD AUTO: 30.4 % (ref 40–54)
HGB BLD-MCNC: 9 GM/DL (ref 14–18)
HIV 1+2 AB+HIV1 P24 AG SERPL QL IA: NORMAL
HOLD SPECIMEN: NORMAL
IMM GRANULOCYTES # BLD AUTO: 0.03 K/UL (ref 0–0.04)
IMM GRANULOCYTES NFR BLD AUTO: 0.5 % (ref 0–0.5)
LYMPHOCYTES # BLD AUTO: 1.12 K/UL (ref 1–4.8)
MAGNESIUM SERPL-MCNC: 1.9 MG/DL (ref 1.6–2.6)
MCH RBC QN AUTO: 21.2 PG (ref 27–31)
MCHC RBC AUTO-ENTMCNC: 29.6 G/DL (ref 32–36)
MCV RBC AUTO: 72 FL (ref 82–98)
NUCLEATED RBC (/100WBC) (OHS): 0 /100 WBC
OHS QRS DURATION: 122 MS
OHS QTC CALCULATION: 480 MS
PHOSPHATE SERPL-MCNC: 3.4 MG/DL (ref 2.7–4.5)
PLATELET # BLD AUTO: 134 K/UL (ref 150–450)
PMV BLD AUTO: ABNORMAL FL
POCT GLUCOSE: 156 MG/DL (ref 70–110)
POCT GLUCOSE: 183 MG/DL (ref 70–110)
POCT GLUCOSE: 209 MG/DL (ref 70–110)
POCT GLUCOSE: 217 MG/DL (ref 70–110)
POCT GLUCOSE: 282 MG/DL (ref 70–110)
POCT GLUCOSE: 282 MG/DL (ref 70–110)
POCT GLUCOSE: 296 MG/DL (ref 70–110)
POTASSIUM SERPL-SCNC: 3.4 MMOL/L (ref 3.5–5.1)
RBC # BLD AUTO: 4.24 M/UL (ref 4.6–6.2)
RELATIVE EOSINOPHIL (OHS): 6 %
RELATIVE LYMPHOCYTE (OHS): 19.2 % (ref 18–48)
RELATIVE MONOCYTE (OHS): 16.7 % (ref 4–15)
RELATIVE NEUTROPHIL (OHS): 57.1 % (ref 38–73)
SODIUM SERPL-SCNC: 137 MMOL/L (ref 136–145)
TACROLIMUS BLD-MCNC: 5.4 NG/ML (ref 5–15)
VANCOMYCIN SERPL-MCNC: 19.3 UG/ML (ref ?–80)
WBC # BLD AUTO: 5.82 K/UL (ref 3.9–12.7)

## 2025-07-14 PROCEDURE — 84100 ASSAY OF PHOSPHORUS: CPT | Mod: HCNC

## 2025-07-14 PROCEDURE — 99232 SBSQ HOSP IP/OBS MODERATE 35: CPT | Mod: HCNC,GC,, | Performed by: INTERNAL MEDICINE

## 2025-07-14 PROCEDURE — 11000001 HC ACUTE MED/SURG PRIVATE ROOM: Mod: HCNC

## 2025-07-14 PROCEDURE — 85025 COMPLETE CBC W/AUTO DIFF WBC: CPT | Mod: HCNC

## 2025-07-14 PROCEDURE — 36415 COLL VENOUS BLD VENIPUNCTURE: CPT | Mod: HCNC

## 2025-07-14 PROCEDURE — 83735 ASSAY OF MAGNESIUM: CPT | Mod: HCNC

## 2025-07-14 PROCEDURE — 92610 EVALUATE SWALLOWING FUNCTION: CPT | Mod: HCNC

## 2025-07-14 PROCEDURE — 25000003 PHARM REV CODE 250: Mod: HCNC | Performed by: STUDENT IN AN ORGANIZED HEALTH CARE EDUCATION/TRAINING PROGRAM

## 2025-07-14 PROCEDURE — 99232 SBSQ HOSP IP/OBS MODERATE 35: CPT | Mod: HCNC,,,

## 2025-07-14 PROCEDURE — 25000003 PHARM REV CODE 250: Mod: HCNC

## 2025-07-14 PROCEDURE — 99223 1ST HOSP IP/OBS HIGH 75: CPT | Mod: HCNC,GC,, | Performed by: INTERNAL MEDICINE

## 2025-07-14 PROCEDURE — 63600175 PHARM REV CODE 636 W HCPCS: Mod: HCNC

## 2025-07-14 PROCEDURE — 80197 ASSAY OF TACROLIMUS: CPT | Mod: HCNC

## 2025-07-14 PROCEDURE — 80051 ELECTROLYTE PANEL: CPT | Mod: HCNC

## 2025-07-14 PROCEDURE — 80202 ASSAY OF VANCOMYCIN: CPT | Mod: HCNC

## 2025-07-14 PROCEDURE — 99233 SBSQ HOSP IP/OBS HIGH 50: CPT | Mod: HCNC,,, | Performed by: INTERNAL MEDICINE

## 2025-07-14 RX ORDER — DOXAZOSIN 8 MG/1
16 TABLET ORAL DAILY
Status: DISCONTINUED | OUTPATIENT
Start: 2025-07-15 | End: 2025-07-18 | Stop reason: HOSPADM

## 2025-07-14 RX ORDER — ISOSORBIDE MONONITRATE 30 MG/1
120 TABLET, EXTENDED RELEASE ORAL DAILY
Status: DISCONTINUED | OUTPATIENT
Start: 2025-07-14 | End: 2025-07-14

## 2025-07-14 RX ORDER — ISOSORBIDE MONONITRATE 30 MG/1
60 TABLET, EXTENDED RELEASE ORAL DAILY
Status: DISCONTINUED | OUTPATIENT
Start: 2025-07-14 | End: 2025-07-15

## 2025-07-14 RX ORDER — POTASSIUM CHLORIDE 20 MEQ/1
40 TABLET, EXTENDED RELEASE ORAL ONCE
Status: COMPLETED | OUTPATIENT
Start: 2025-07-14 | End: 2025-07-14

## 2025-07-14 RX ORDER — HYDRALAZINE HYDROCHLORIDE 20 MG/ML
10 INJECTION INTRAMUSCULAR; INTRAVENOUS ONCE
Status: COMPLETED | OUTPATIENT
Start: 2025-07-14 | End: 2025-07-14

## 2025-07-14 RX ORDER — AMOXICILLIN AND CLAVULANATE POTASSIUM 500; 125 MG/1; MG/1
1 TABLET, FILM COATED ORAL EVERY 12 HOURS
Status: DISCONTINUED | OUTPATIENT
Start: 2025-07-14 | End: 2025-07-18 | Stop reason: HOSPADM

## 2025-07-14 RX ORDER — DOXYCYCLINE HYCLATE 100 MG
100 TABLET ORAL EVERY 12 HOURS
Status: DISCONTINUED | OUTPATIENT
Start: 2025-07-14 | End: 2025-07-18 | Stop reason: HOSPADM

## 2025-07-14 RX ORDER — INSULIN ASPART 100 [IU]/ML
5 INJECTION, SOLUTION INTRAVENOUS; SUBCUTANEOUS
Status: DISCONTINUED | OUTPATIENT
Start: 2025-07-14 | End: 2025-07-15

## 2025-07-14 RX ORDER — INSULIN ASPART 100 [IU]/ML
4 INJECTION, SOLUTION INTRAVENOUS; SUBCUTANEOUS
Status: DISCONTINUED | OUTPATIENT
Start: 2025-07-14 | End: 2025-07-14

## 2025-07-14 RX ORDER — HYDRALAZINE HYDROCHLORIDE 50 MG/1
100 TABLET, FILM COATED ORAL EVERY 8 HOURS
Status: DISCONTINUED | OUTPATIENT
Start: 2025-07-14 | End: 2025-07-15

## 2025-07-14 RX ORDER — DOXAZOSIN 8 MG/1
8 TABLET ORAL ONCE
Status: COMPLETED | OUTPATIENT
Start: 2025-07-14 | End: 2025-07-14

## 2025-07-14 RX ORDER — DOXAZOSIN 8 MG/1
8 TABLET ORAL DAILY
Status: DISCONTINUED | OUTPATIENT
Start: 2025-07-14 | End: 2025-07-14

## 2025-07-14 RX ADMIN — PREDNISONE 5 MG: 5 TABLET ORAL at 08:07

## 2025-07-14 RX ADMIN — INSULIN ASPART 1 UNITS: 100 INJECTION, SOLUTION INTRAVENOUS; SUBCUTANEOUS at 08:07

## 2025-07-14 RX ADMIN — INSULIN ASPART 2 UNITS: 100 INJECTION, SOLUTION INTRAVENOUS; SUBCUTANEOUS at 08:07

## 2025-07-14 RX ADMIN — HYDRALAZINE HYDROCHLORIDE 10 MG: 20 INJECTION, SOLUTION INTRAMUSCULAR; INTRAVENOUS at 05:07

## 2025-07-14 RX ADMIN — HYDRALAZINE HYDROCHLORIDE: 25 TABLET ORAL at 04:07

## 2025-07-14 RX ADMIN — AMOXICILLIN AND CLAVULANATE POTASSIUM 500 MG: 500; 125 TABLET, FILM COATED ORAL at 08:07

## 2025-07-14 RX ADMIN — INSULIN ASPART 2 UNITS: 100 INJECTION, SOLUTION INTRAVENOUS; SUBCUTANEOUS at 01:07

## 2025-07-14 RX ADMIN — ISOSORBIDE MONONITRATE 60 MG: 30 TABLET, EXTENDED RELEASE ORAL at 08:07

## 2025-07-14 RX ADMIN — METRONIDAZOLE 500 MG: 500 INJECTION, SOLUTION INTRAVENOUS at 03:07

## 2025-07-14 RX ADMIN — METRONIDAZOLE 500 MG: 500 INJECTION, SOLUTION INTRAVENOUS at 12:07

## 2025-07-14 RX ADMIN — CARVEDILOL 25 MG: 25 TABLET, FILM COATED ORAL at 08:07

## 2025-07-14 RX ADMIN — RIVAROXABAN 15 MG: 15 TABLET, FILM COATED ORAL at 05:07

## 2025-07-14 RX ADMIN — ASPIRIN 81 MG CHEWABLE TABLET 81 MG: 81 TABLET CHEWABLE at 08:07

## 2025-07-14 RX ADMIN — FUROSEMIDE 60 MG: 10 INJECTION, SOLUTION INTRAMUSCULAR; INTRAVENOUS at 08:07

## 2025-07-14 RX ADMIN — DOXAZOSIN 8 MG: 8 TABLET ORAL at 03:07

## 2025-07-14 RX ADMIN — ATORVASTATIN CALCIUM 80 MG: 40 TABLET, FILM COATED ORAL at 08:07

## 2025-07-14 RX ADMIN — TACROLIMUS 1 MG: 1 CAPSULE ORAL at 08:07

## 2025-07-14 RX ADMIN — TACROLIMUS 1 MG: 1 CAPSULE ORAL at 05:07

## 2025-07-14 RX ADMIN — POTASSIUM CHLORIDE 40 MEQ: 1500 TABLET, EXTENDED RELEASE ORAL at 02:07

## 2025-07-14 RX ADMIN — DOXYCYCLINE HYCLATE 100 MG: 100 TABLET, COATED ORAL at 08:07

## 2025-07-14 RX ADMIN — CEFTRIAXONE 1 G: 1 INJECTION, POWDER, FOR SOLUTION INTRAMUSCULAR; INTRAVENOUS at 03:07

## 2025-07-14 RX ADMIN — INSULIN ASPART 1 UNITS: 100 INJECTION, SOLUTION INTRAVENOUS; SUBCUTANEOUS at 02:07

## 2025-07-14 RX ADMIN — INSULIN ASPART 3 UNITS: 100 INJECTION, SOLUTION INTRAVENOUS; SUBCUTANEOUS at 05:07

## 2025-07-14 RX ADMIN — FUROSEMIDE 60 MG: 10 INJECTION, SOLUTION INTRAMUSCULAR; INTRAVENOUS at 05:07

## 2025-07-14 RX ADMIN — FERROUS SULFATE TAB EC 325 MG (65 MG FE EQUIVALENT) 1 EACH: 325 (65 FE) TABLET DELAYED RESPONSE at 08:07

## 2025-07-14 RX ADMIN — HYDRALAZINE HYDROCHLORIDE 100 MG: 50 TABLET ORAL at 09:07

## 2025-07-14 RX ADMIN — HYDRALAZINE HYDROCHLORIDE 100 MG: 50 TABLET ORAL at 01:07

## 2025-07-14 RX ADMIN — INSULIN ASPART 3 UNITS: 100 INJECTION, SOLUTION INTRAVENOUS; SUBCUTANEOUS at 08:07

## 2025-07-14 RX ADMIN — DOXAZOSIN 8 MG: 8 TABLET ORAL at 08:07

## 2025-07-14 RX ADMIN — INSULIN ASPART 5 UNITS: 100 INJECTION, SOLUTION INTRAVENOUS; SUBCUTANEOUS at 05:07

## 2025-07-14 NOTE — SUBJECTIVE & OBJECTIVE
"Interval HPI:   No acute events overnight. Patient in room 34908/16909 A. Blood glucose stable. BG at and above goal on current insulin regimen (Transition Insulin Drip). Steroid use- Prednisone 5 mg daily.    Renal function- Abnormal    Lab Results   Component Value Date    CREATININE 3.9 (H) 2025     Vasopressors-  None     Endocrine will continue to follow and manage insulin orders inpatient.     Diet Low Sodium (2 gm) Fluid - 1500mL     Eatin%  Nausea: No  Hypoglycemia and intervention: No  Fever: No  TPN and/or TF: No  If yes, type of TF/TPN and rate: N/A    BP (!) 198/88 (BP Location: Right arm)   Pulse 74   Temp 98.4 °F (36.9 °C) (Oral)   Resp 16   Ht 6' 1" (1.854 m)   Wt 87.7 kg (193 lb 5.5 oz)   SpO2 100%   BMI 25.51 kg/m²     Labs Reviewed and Include    Recent Labs   Lab 25   K 4.1     Lab Results   Component Value Date    WBC 6.36 2025    HGB 8.5 (L) 2025    HCT 30.4 (L) 2025    MCV 77 (L) 2025     (L) 2025     Recent Labs   Lab 25   TSH 1.684     Lab Results   Component Value Date    HGBA1C 6.5 (H) 2025       Nutritional status:   Body mass index is 25.51 kg/m².  Lab Results   Component Value Date    ALBUMIN 3.1 (L) 2025    ALBUMIN 3.4 (L) 2025    ALBUMIN 3.1 (L) 2025     No results found for: "PREALBUMIN"    Estimated Creatinine Clearance: 20.8 mL/min (A) (based on SCr of 3.9 mg/dL (H)).    Accu-Checks  Recent Labs     25  0811 25  1423 25  1542 25  1709 25  2125 25  0251   POCTGLUCOSE 173* 272* 253* 278* 279* 183*       Current Medications and/or Treatments Impacting Glycemic Control  Immunotherapy:    Immunosuppressants           Stop Route Frequency     tacrolimus capsule 1 mg         -- Oral 2 times daily          Steroids:   Hormones (From admission, onward)      Start     Stop Route Frequency Ordered    25 0900  predniSONE tablet 5 mg         -- Oral " Daily 07/13/25 0543    07/13/25 0138  melatonin tablet 6 mg         -- Oral Nightly PRN 07/13/25 0040          Pressors:    Autonomic Drugs (From admission, onward)      None          Hyperglycemia/Diabetes Medications:   Antihyperglycemics (From admission, onward)      Start     Stop Route Frequency Ordered    07/13/25 1645  insulin aspart U-100 pen 2 Units         -- SubQ 3 times daily with meals 07/13/25 1232    07/13/25 1345  insulin regular in 0.9 % NaCl 100 unit/100 mL (1 unit/mL) infusion        Question:  Enter initial dose (Units/hr):  Answer:  0.4    -- IV Continuous 07/13/25 1232    07/13/25 1332  insulin aspart U-100 pen 0-5 Units         -- SubQ Every 4 hours PRN 07/13/25 1232

## 2025-07-14 NOTE — ASSESSMENT & PLAN NOTE
Anemia is likely due to chronic disease due to Chronic Kidney Disease. Most recent hemoglobin and hematocrit are listed below.  Recent Labs     07/12/25  2021 07/13/25  0311 07/14/25  0739   HGB 8.9* 8.5* 9.0*   HCT 30.2* 30.4* 30.4*     Plan  - Monitor serial CBC: Daily  - Transfuse PRBC if patient becomes hemodynamically unstable, symptomatic or H/H drops below 7/21.  - Patient has not received any PRBC transfusions to date  - Patient's anemia is currently stable

## 2025-07-14 NOTE — PT/OT/SLP EVAL
Speech Language Pathology Evaluation/Discharge  Bedside Swallow    Patient Name:  Ravi Zamorano   MRN:  [id number]  Admitting Diagnosis: Acute on chronic diastolic congestive heart failure    Recommendations:                 General Recommendations:  Follow-up not indicated  Diet recommendations:  Regular Diet - IDDSI Level 7, Thin liquids - IDDSI Level 0   Aspiration Precautions: 1 bite/sip at a time, Alternating bites/sips, Avoid talking while eating, HOB to 90 degrees, Monitor for s/s of aspiration, Remain upright 30 minutes post meal, Small bites/sips, and Strict aspiration precautions   General Precautions: Standard, aspiration, fall  Communication strategies:  none  Discharge recommendations:  No Therapy Indicated     Assessment:     Ravi Zamorano is a 69 y.o. male. Oral and pharyngeal swallowing function appears to be WFL.  Pt safe to continue regular consistency diet and thin liquids.  Previous bedside swallowing assessments and FEES have also revealed no concerns for quan-pharyngeal dysphagia. Previous SLP recommendations in May 2025 were for GI consultation to evaluate for esophageal dysphagia.  No further skilled SLP services warranted at this time.     History:     Past Medical History:   Diagnosis Date    Anxiety 07/29/2014    Arthritis     atrial fibrillation     History of cardioversion    Bilateral diabetic retinopathy 2017    Cardioembolic stroke 12/07/2024    almost completely resolved - very mild left hand weakness    CKD (chronic kidney disease)     in transplanted kidney    Colon polyps 2014    Depression - situational 07/29/2014    Diabetes type 2 2000    since 2000.  c/b neuropathy, CKD    Encounter for blood transfusion     History of hepatitis C, s/p successful Rx w/ SVR24 - 9/2017 07/29/2014    Genotype 1a, treatment naive 10/2014 liver biopsy - grade 1 / stage 1 Completed Harvoni w/ SVR    Hyperlipidemia 07/29/2014    Hypertension     Hyponatremia 02/20/2025    Pancreatitis     S/P  cadaveric kidney transplant 11/5/2016. ESRD secondary to HTN/DMII 11/05/2016    Stroke 2024       Past Surgical History:   Procedure Laterality Date    ABLATION OF ARRHYTHMOGENIC FOCUS FOR ATRIAL FIBRILLATION N/A 6/11/2024    Procedure: Ablation atrial fibrillation;  Surgeon: Bronson Bowden MD;  Location: [facility] EP LAB;  Service: Cardiology;  Laterality: N/A;  AF, FELICIANO (cx if SR), PVI, RFA, Carto, Gen, GP, 3 Prep    ABLATION OF ARRHYTHMOGENIC FOCUS FOR ATRIAL FIBRILLATION N/A 6/12/2025    Procedure: Ablation atrial fibrillation;  Surgeon: Bronson Bowden MD;  Location: [facility] EP LAB;  Service: Cardiology;  Laterality: N/A;  AF, FELICIANO (h/o CVA), redo PVI, CTI, RFA, Carto, Gen, GP, 3 Prep *insulin pump*    ABLATION, ATRIAL FLUTTER, ATYPICAL  6/12/2025    Procedure: Ablation, Atrial Flutter, Atypical;  Surgeon: Bronson Bowden MD;  Location: [facility] EP LAB;  Service: Cardiology;;    ABLATION, ATRIAL FLUTTER, TYPICAL  6/11/2024    Procedure: Ablation, Atrial Flutter, Typical;  Surgeon: Bronson Bowden MD;  Location: [facility] EP LAB;  Service: Cardiology;;    ABLATION, ATRIAL FLUTTER, TYPICAL N/A 6/12/2025    Procedure: Ablation, Atrial Flutter, Typical;  Surgeon: Bronson Bowden MD;  Location: [facility] EP LAB;  Service: Cardiology;  Laterality: N/A;    APPENDECTOMY      BONE MARROW BIOPSY Left 6/26/2024    Procedure: Biopsy-bone marrow;  Surgeon: Bridger Zapata MD;  Location: [facility] ENDO ([facility] FLR);  Service: Oncology;  Laterality: Left;  6/24-pt knows to hold aspirin and eliquis as instructed by hem/onc, precall complete-Kpvt    CARDIOVERSION  6/11/2024    Procedure: Cardioversion;  Surgeon: Bronson Bowden MD;  Location: [facility] EP LAB;  Service: Cardiology;;    CATARACT EXTRACTION W/  INTRAOCULAR LENS IMPLANT Right 3/13/2024    Procedure: EXTRACTION, CATARACT, WITH IOL INSERTION;  Surgeon: Jennifer Palacio MD;  Location: [facility] OR;  Service: Ophthalmology;  Laterality: Right;    CATARACT EXTRACTION W/  INTRAOCULAR LENS IMPLANT  "Left 4/10/2024    Procedure: EXTRACTION, CATARACT, WITH IOL INSERTION;  Surgeon: Jennifer Palacio MD;  Location: [facility] OR;  Service: Ophthalmology;  Laterality: Left;    COLONOSCOPY N/A 12/21/2020    Procedure: COLONOSCOPY;  Surgeon: Tico Bell MD;  Location: [facility] ENDO (4TH FLR);  Service: Endoscopy;  Laterality: N/A;  ok to hold eliquis per Dr. Valadez, see telephone encounter 11/13/2020-MS  covid test 12/18 [place]    ECHOCARDIOGRAM,TRANSESOPHAGEAL N/A 2/3/2025    Procedure: Transesophageal echo (FELICIANO) intra-procedure log documentation;  Surgeon: Grayson Lin III, MD;  Location: [facility] EP LAB;  Service: Cardiology;  Laterality: N/A;    KIDNEY TRANSPLANT      TRANSESOPHAGEAL ECHOCARDIOGRAPHY N/A 8/26/2019    Procedure: ECHOCARDIOGRAM, TRANSESOPHAGEAL;  Surgeon: Dolores Diagnostic Provider;  Location: [facility] EP LAB;  Service: Cardiology;  Laterality: N/A;    TRANSESOPHAGEAL ECHOCARDIOGRAPHY N/A 6/11/2024    Procedure: ECHOCARDIOGRAM, TRANSESOPHAGEAL;  Surgeon: Grayson Lin III, MD;  Location: [facility] EP LAB;  Service: Cardiology;  Laterality: N/A;    TRANSESOPHAGEAL ECHOCARDIOGRAPHY N/A 6/12/2025    Procedure: ECHOCARDIOGRAM, TRANSESOPHAGEAL;  Surgeon: Breezy Nguyen MD;  Location: [facility] EP LAB;  Service: Cardiology;  Laterality: N/A;    TREATMENT OF CARDIAC ARRHYTHMIA N/A 8/26/2019    Procedure: CARDIOVERSION;  Surgeon: Bronson Bowden MD;  Location: [facility] EP LAB;  Service: Cardiology;  Laterality: N/A;  AF, FELICIANO, DCCV, MAC, GP, 3 PREP    TREATMENT OF CARDIAC ARRHYTHMIA N/A 2/3/2025    Procedure: Cardioversion or Defibrillation;  Surgeon: Bronson Bowden MD;  Location: [facility] EP LAB;  Service: Cardiology;  Laterality: N/A;  AF, FELICIANO, DCCV, MAC, GP, 3 Prep       Prior Intubation HX:  none during this admission    Modified Barium Swallow: non on file    FEES: 5/2/25: "The patient tolerated the procedure and the equipment was removed. Findings were consistent with the following swallow diagnoses following " "swallow diagnosis and severity: Functional oral and pharyngeal phases   Dysphagia is further characterized by intact swallow function without evidence of penetration or aspiration with all consistencies presented. "     Chest X-Rays: 7/12/25: Impression: Interval resolution small left effusion. Cardiomegaly with interstitial edema, similar compared to prior.    Prior diet: regular/thins    Subjective     Pt denies any difficulty swallowing and any symptoms inline with reflux or other esophageal issues.     Pain/Comfort:  Pain Rating 1: 0/10    Respiratory Status: Room air    Objective:     Oral Musculature Evaluation  Oral Musculature: WFL  Dentition: scattered dentition  Secretion Management: adequate  Mucosal Quality: adequate  Mandibular Strength and Mobility: WFL  Oral Labial Strength and Mobility: WFL  Lingual Strength and Mobility: WFL  Velar Elevation: WFL  Buccal Strength and Mobility: WFL  Volitional Cough: adequate  Volitional Swallow: elicited  Voice Prior to PO Intake: dry, clear    Bedside Swallow Eval:   Consistencies Assessed:  Thin liquids cup sip x 1, straw sips x 2  Solids cookie bites x 2     Oral Phase:   WFL    Pharyngeal Phase:   no overt clinical signs/symptoms of aspiration  no overt clinical signs/symptoms of pharyngeal dysphagia    Compensatory Strategies  None    Treatment: Education provided to pt regarding role of SLP, purpose of swallowing assessment, swallowing abilities appearing to be WFL, recommendations to continue current diet, standard aspiration and reflux precautions, and  no further skilled SLP services warranted at this time.       Goals:   Multidisciplinary Problems       SLP Goals       Not on file                    Plan:     Patient to be seen:      Plan of Care expires:     Plan of Care reviewed with:      SLP Follow-Up:  No       Time Tracking:     SLP Treatment Date:   07/14/25  Speech Start Time:  1501  Speech Stop Time:  1508     Speech Total Time (min):  7 " min    Billable Minutes: Eval Swallow and Oral Function 7    07/14/2025

## 2025-07-14 NOTE — SUBJECTIVE & OBJECTIVE
Interval History: BP elevated overnight. HR dropped to 40s with some pauses on tele. Asymptomatic. Discussed with Cards hold BB and monitor. HR now in the 80s. BP improving this AM. Discussed renal US findings with patient.     Review of Systems  Objective:     Vital Signs (Most Recent):  Temp: 98.9 °F (37.2 °C) (07/14/25 1115)  Pulse: 61 (07/14/25 1115)  Resp: 18 (07/14/25 1115)  BP: (!) 167/76 (07/14/25 1115)  SpO2: 98 % (07/14/25 1115) Vital Signs (24h Range):  Temp:  [97.6 °F (36.4 °C)-98.9 °F (37.2 °C)] 98.9 °F (37.2 °C)  Pulse:  [44-81] 61  Resp:  [10-96] 18  SpO2:  [90 %-100 %] 98 %  BP: (145-219)/() 167/76     Weight: 87.7 kg (193 lb 5.5 oz)  Body mass index is 25.51 kg/m².    Intake/Output Summary (Last 24 hours) at 7/14/2025 1157  Last data filed at 7/14/2025 1054  Gross per 24 hour   Intake 220 ml   Output 1700 ml   Net -1480 ml         Physical Exam  Constitutional:       Appearance: Normal appearance.   HENT:      Head: Normocephalic and atraumatic.      Nose: Nose normal.      Mouth/Throat:      Mouth: Mucous membranes are moist.   Eyes:      Extraocular Movements: Extraocular movements intact.      Pupils: Pupils are equal, round, and reactive to light.   Cardiovascular:      Rate and Rhythm: Normal rate and regular rhythm.      Heart sounds: No murmur heard.     No gallop.   Pulmonary:      Effort: Pulmonary effort is normal.      Breath sounds: Normal breath sounds. No wheezing or rales.   Abdominal:      General: Abdomen is flat. Bowel sounds are normal. There is no distension.      Palpations: Abdomen is soft.      Tenderness: There is no abdominal tenderness.   Musculoskeletal:         General: No swelling or tenderness. Normal range of motion.      Cervical back: Normal range of motion and neck supple.      Right lower leg: Edema present.      Left lower leg: Edema present.   Skin:     General: Skin is warm and dry.      Capillary Refill: Capillary refill takes less than 2 seconds.    Neurological:      General: No focal deficit present.      Mental Status: He is alert. Mental status is at baseline.   Psychiatric:         Mood and Affect: Mood normal.               Significant Labs: All pertinent labs within the past 24 hours have been reviewed.    Significant Imaging: I have reviewed all pertinent imaging results/findings within the past 24 hours.

## 2025-07-14 NOTE — HOSPITAL COURSE
While on the floor patient remained hypertensive. KTM and ID consulted as well as Nephrology. Continued on IV diuresis for concerns of Cardiorenal with improvement in Cr. On IV antibiotics per ID recs with Cefepime and Flagyl. CT C/A/P notable for aspiration PNA as well as concerns for renal mass. Dedicated Renal US ordered and notable for new renal lesion on the left kidney concerning for renal cell carcinoma. Urology recommending outpatient follow up for Uro Onc clinic. KTM recommending Pulm consult for recurrent PNA. Esophagram ordered given history of recurrent PNA. Cr improved and diuresis decreased to PO. Hypertensive throughout hospitalization. ARB restarted since Cr stable. Had concerns of pauses on tele and AV block. Discussed with EP. Hold BB at discharge. No third deg av block on EKG or seen when reviewing tele with EP. Had fever on 7/16, discussed with ID. Will continue to monitor now and can consider re consulting if fever persist.     Was afebrile fro 48 hours, certainly the fevers may be due to possible malignancy, this was discussed with him and wife. Will need f/u for possible nephrectomy.

## 2025-07-14 NOTE — PROGRESS NOTES
"[facility]  Progress Note    Patient Name: Ravi Zamorano  MRN: [id number]  Patient Class: IP- Inpatient   Admission Date: 7/12/2025  Length of Stay: 1 days  Attending Physician: Celine Rodriguez MD  Primary Care Provider: Mima Mack MD        Subjective     Principal Problem:Acute on chronic diastolic congestive heart failure        HPI:  Ravi Zamorano is a 69 y.o. male s/p kidney transplant in 2016 on tacro, CKD 4, HFpEF, T2DM with insulin pump use, afib on xarelto and AICD being admitted to hospital medicine for manangement of PHUONG on CKD and CHF exacerbation.  Patient reports increased feelings of lightheadedness/dizziness with leaning forward the past several days. Endorses associated unsteady gait and bilateral "hand twitching". During the encounter he seems to have a difficult time explaining his symptoms or their duration. Denies any recent illness, cough, congestion, N/V/D, abdominal pain, dysuria or hematuria. Febrile to 101.4 F on admission, when asked if having recent fevers he says he often has fevers to 101 F at home but is unable to tell me the most recent time this has happened. Sates he does not know what his current home medication regimen is.       In ED: hypertensive to 230/98, febrile to Tmax 101.4 F, remaining vitals stable. CBC without leukocytosis, hgb 8.5 which is baseline. CMP notable for PHUONG on CKD (creatinine 3.9, most recent baseline ~3.1). BNP elevated to 4,888 (elevated from most recent 3,164 6 days ago). Lactic acid and procal wnl. UA non infectious. CT head without acute intracranial abnormality. CXR with cardiomegaly with interstitial edema. Given 1 0 mg IV hydralazine x1, 20 mg IV hydralazine x1 and 80 mg IV lasix in the ED. Admitted to hospital medicine for further management.     Overview/Hospital Course:  While on the floor patient remained hypertensive. KTM and ID consulted as well as Nephrology. Continued on IV " diuresis for concerns of Cardiorenal with improvement in Cr. On IV antibiotics per ID recs with Cefepime and Flagyl. CT C/A/P notable for aspiration PNA as well as concerns for renal mass. Dedicated Renal US ordered and notable for new renal lesion on the left kidney concerning for renal cell carcinoma. Urology consulted. KTM recommending Pulm consult for recurrent PNA.            Interval History: BP elevated overnight. HR dropped to 40s with some pauses on tele. Asymptomatic. Discussed with Cards hold BB and monitor. HR now in the 80s. BP improving this AM. Discussed renal US findings with patient.     Review of Systems  Objective:     Vital Signs (Most Recent):  Temp: 98.9 °F (37.2 °C) (07/14/25 1115)  Pulse: 61 (07/14/25 1115)  Resp: 18 (07/14/25 1115)  BP: (!) 167/76 (07/14/25 1115)  SpO2: 98 % (07/14/25 1115) Vital Signs (24h Range):  Temp:  [97.6 °F (36.4 °C)-98.9 °F (37.2 °C)] 98.9 °F (37.2 °C)  Pulse:  [44-81] 61  Resp:  [10-96] 18  SpO2:  [90 %-100 %] 98 %  BP: (145-219)/() 167/76     Weight: 87.7 kg (193 lb 5.5 oz)  Body mass index is 25.51 kg/m².    Intake/Output Summary (Last 24 hours) at 7/14/2025 1157  Last data filed at 7/14/2025 1054  Gross per 24 hour   Intake 220 ml   Output 1700 ml   Net -1480 ml         Physical Exam  Constitutional:       Appearance: Normal appearance.   HENT:      Head: Normocephalic and atraumatic.      Nose: Nose normal.      Mouth/Throat:      Mouth: Mucous membranes are moist.   Eyes:      Extraocular Movements: Extraocular movements intact.      Pupils: Pupils are equal, round, and reactive to light.   Cardiovascular:      Rate and Rhythm: Normal rate and regular rhythm.      Heart sounds: No murmur heard.     No gallop.   Pulmonary:      Effort: Pulmonary effort is normal.      Breath sounds: Normal breath sounds. No wheezing or rales.   Abdominal:      General: Abdomen is flat. Bowel sounds are normal. There is no distension.      Palpations: Abdomen is soft.       Tenderness: There is no abdominal tenderness.   Musculoskeletal:         General: No swelling or tenderness. Normal range of motion.      Cervical back: Normal range of motion and neck supple.      Right lower leg: Edema present.      Left lower leg: Edema present.   Skin:     General: Skin is warm and dry.      Capillary Refill: Capillary refill takes less than 2 seconds.   Neurological:      General: No focal deficit present.      Mental Status: He is alert. Mental status is at baseline.   Psychiatric:         Mood and Affect: Mood normal.               Significant Labs: All pertinent labs within the past 24 hours have been reviewed.    Significant Imaging: I have reviewed all pertinent imaging results/findings within the past 24 hours.      Assessment & Plan  Acute on chronic diastolic congestive heart failure   - CHF pathway initiated   - CXR revealed Cardiomegaly with interstitial edema,   - BNP 4,888 / troponin 205>>220  - EKG with atrial fibrillation recurrence (recent ablation 04/25)  - last echo showing ejection fraction of 50 - 55%.   - repeat TTE ordered   -  continue Lasix 60 mg BID   - strict Is/Os   - cardiac, low sodium diet  - fluid restrict 1.5L while inpatient  - daily CMP   - monitor on cardiac telemetry   - supplemental O2 as needed   Febrile illness, acute  Unclear etiology, patient without symptoms to guide possible cause  - febrile to 101.4F on admission  - CBC without leukocytosis  - CMP with PHUONG  - UA non infectious  - lactic acid and procal wnl  - RIP negative   - BCX ordered  - CXR without consolidation  - CT head without acute process  -CT C/A/P concerning for asp vs multifocal PNA and renal mass    -possible component of malignancy causing fevers   -transplant ID following   -continue Rocephin and flagyl      Hypertensive emergency  Patient has a current diagnosis of Hypertensive emergency with end organ damage evidenced by acute kidney injury which is controlled.  Latest blood pressure  and vitals reviewed-   Temp:  [97.6 °F (36.4 °C)-98.9 °F (37.2 °C)]   Pulse:  [44-81]   Resp:  [10-96]   BP: (145-219)/()   SpO2:  [90 %-100 %] .   Patient currently off IV antihypertensives.   Home meds for hypertension were reviewed and noted below.   -unfortunately holding ARB due to PHUONG and BB due to bradycardia   -cannot tolerate CCB   -will increase doxazosin and start hydralazine 100 mg TID and Imdur 60 with plans to increase to 90 if needed     Pneumonia  -noted on CT findings   -continue Flagyl and Rocephin   -pulm consulted per ktm recs for recurrent PNA     Renal mass  -noted on CT A/P and on dedicated Retroperitoneal US  -Urology consulted     Acute kidney injury superimposed on stage 4 chronic kidney disease  CKD IV (severe)  Suspect etiology cardiorenal.    - Cr 3.9.  Baseline Cr 3.1  - UA unremarkable   - renal transplant US pending   - nephrology consulted, appreciate recommendations   - hold ACE-I or ARB while renal function dynamic  - Monitor UOP and follow serial BMP   - Adjust drugs to GFR/CrCL; avoid nephrotoxic drugs   -  Estimated Creatinine Clearance: 23.1 mL/min (A) (based on SCr of 3.5 mg/dL (H)).   - Monitor electrolytes, phos levels daily  -improving with diuresis and BP control   Type 2 diabetes mellitus with stage 4 chronic kidney disease, with long-term current use of insulin  Insulin pump in place  - Patient's FSGs are controlled on current hypoglycemics.   - Last A1c reviewed- 6.5  Maintain anti-hyperglycemic dose as follows-   Antihyperglycemics (From admission, onward)      Start     Stop Route Frequency Ordered    07/13/25 1645  insulin aspart U-100 pen 2 Units         -- SubQ 3 times daily with meals 07/13/25 1232    07/13/25 1345  insulin regular in 0.9 % NaCl 100 unit/100 mL (1 unit/mL) infusion        Question:  Enter initial dose (Units/hr):  Answer:  0.4    -- IV Continuous 07/13/25 1232    07/13/25 1332  insulin aspart U-100 pen 0-5 Units         -- SubQ Every 4 hours  PRN 07/13/25 1232          - endocrine consulted for insulin pump management on continuous insulin gtt until he can get omnipod   Hyperlipidemia associated with type 2 diabetes mellitus  - continue statin  S/P cadaveric kidney transplant 11/5/2016. ESRD secondary to HTN/DMII  Immunocompromised state due to drug therapy  Long-term use of immunosuppressant medication  - continue home tacro 1 mg BID  - KTM consulted  - renal transplant US ordered  - daily BMP, daily tacro levels   Paroxysmal atrial fibrillation  Long term (current) use of anticoagulants  Patient has paroxysmal (<7 days) atrial fibrillation. Patient is currently in atrial fibrillation. [id number] Score: 3. The patients heart rate in the last 24 hours is as follows:  Pulse  Min: 44  Max: 81  Antiarrhythmics     Anticoagulants  rivaroxaban tablet 15 mg, With dinner, Oral    Plan  - Replete lytes with a goal of K>4, Mg >2  - Patient is anticoagulated, see medications listed above.  - Patient's afib is currently controlled  - afib recurrence after recent ablation, consider EP consult if symptomatic   Thrombocytopenia, unspecified  The patients 3 most recent labs are listed below.  Recent Labs     07/12/25 2021 07/13/25 0311 07/14/25  0739   * 118* 134*     Plan  - Will transfuse if platelet count is <10k.  - daily CBC   Anemia of chronic disease  Anemia is likely due to chronic disease due to Chronic Kidney Disease. Most recent hemoglobin and hematocrit are listed below.  Recent Labs     07/12/25 2021 07/13/25 0311 07/14/25  0739   HGB 8.9* 8.5* 9.0*   HCT 30.2* 30.4* 30.4*     Plan  - Monitor serial CBC: Daily  - Transfuse PRBC if patient becomes hemodynamically unstable, symptomatic or H/H drops below 7/21.  - Patient has not received any PRBC transfusions to date  - Patient's anemia is currently stable  History of CVA (cerebrovascular accident)  - noted  - continue home statin and asa   Hypokalemia  Patient's most recent potassium results are  listed below.   Recent Labs     07/13/25  0311 07/13/25  2133 07/14/25  0739   K 3.3* 4.1 3.4*     Plan  - Replete potassium per protocol  - Monitor potassium Daily  - Patient's hypokalemia is stable  Hypertension associated with stage 4 chronic kidney disease due to type 2 diabetes mellitus  Uncontrolled on admission  - as above   - vitals q4h   VTE Risk Mitigation (From admission, onward)           Ordered     rivaroxaban tablet 15 mg  With dinner         07/13/25 0543     Reason for No Pharmacological VTE Prophylaxis  Once        Question:  Reasons:  Answer:  Already adequately anticoagulated on oral Anticoagulants    07/13/25 0040     IP VTE HIGH RISK PATIENT  Once         07/13/25 0040     Place sequential compression device  Until discontinued         07/13/25 0040                    Discharge Planning   UBALDO: 7/16/2025     Code Status: Full Code   Medical Readiness for Discharge Date:                            Celine Rodriguez MD  Department of Hospital Medicine   [facility] - Acute Medical Stepdown

## 2025-07-14 NOTE — ASSESSMENT & PLAN NOTE
- Patient's FSGs are controlled on current hypoglycemics.   - Last A1c reviewed- 6.5  Maintain anti-hyperglycemic dose as follows-   Antihyperglycemics (From admission, onward)      Start     Stop Route Frequency Ordered    07/13/25 1645  insulin aspart U-100 pen 2 Units         -- SubQ 3 times daily with meals 07/13/25 1232    07/13/25 1345  insulin regular in 0.9 % NaCl 100 unit/100 mL (1 unit/mL) infusion        Question:  Enter initial dose (Units/hr):  Answer:  0.4    -- IV Continuous 07/13/25 1232    07/13/25 1332  insulin aspart U-100 pen 0-5 Units         -- SubQ Every 4 hours PRN 07/13/25 1232          - endocrine consulted for insulin pump management on continuous insulin gtt until he can get omnipod

## 2025-07-14 NOTE — PROGRESS NOTES
Pharmacist Renal Dose Adjustment Note    Ravi Zamorano is a 69 y.o. male being treated with the medication augmentin    Patient Data:    Vital Signs (Most Recent):  Temp: 98.9 °F (37.2 °C) (07/14/25 1115)  Pulse: 61 (07/14/25 1115)  Resp: 18 (07/14/25 1115)  BP: (!) 167/76 (07/14/25 1115)  SpO2: 98 % (07/14/25 1115) Vital Signs (72h Range):  Temp:  [97.6 °F (36.4 °C)-101.5 °F (38.6 °C)]   Pulse:  []   Resp:  [10-96]   BP: (145-248)/()   SpO2:  [90 %-100 %]      Recent Labs   Lab 07/12/25 2021 07/13/25  0311 07/14/25  0739   CREATININE 3.9* 3.9* 3.5*     Serum creatinine: 3.5 mg/dL (H) 07/14/25 0739  Estimated creatinine clearance: 23.1 mL/min (A)    Augmentin 875/125 mg PO BID will be changed to 500/125 mg PO BID    Pharmacist's Name: Alexi Leahy, PharmD, BCPS   Pharmacist's Extension: [phone]

## 2025-07-14 NOTE — ASSESSMENT & PLAN NOTE
Endocrinology consulted for BG management.   BG goal 140-180     - Not a candidate for insulin pump therapy at this time.   - Patient does not have his insulin pump supplies. However, his wife will arrive tonight. Instructed patient that his wife needs to bring his Omnipod kit, insulin vials, and Dexcom sensors.   - Will keep patient on his current regimen below until he has his pump and CGM supplies.    - Transition drip at 0.4 units/hr with step-down parameters   - Novolog (aspart) insulin 2 Units SQ TIDWM and prn for BG excursions LDC SSI (150/50).  - BG checks /HS/0200  - Hypoglycemia protocol in place    ** Please notify Endocrine for any change and/or advance in diet**  ** Please call Endocrine for any BG related issues **    Discharge Planning:   TBD. Please notify endocrinology prior to discharge.

## 2025-07-14 NOTE — NURSING
Pt to ultrasound via transport with insulin gtt infusing. No need for nurse to assist with transport of this pt on insulin gtt per unit charge nurse. Telemetry notified of pt being transported and switched from bedside monitoring to telebox with TTX number given. VSS with HR 55 bpm on RA -day team MD made aware of bradycardia by day nurse. Pt transferred form bed to stretcher via ambulation with standby assist. No distress noted.

## 2025-07-14 NOTE — SUBJECTIVE & OBJECTIVE
Interval History: improved cough but still with SOB. Denies fevers, nausea, vomiting, diarrhea.     Review of Systems  Objective:     Vital Signs (Most Recent):  Temp: 98.9 °F (37.2 °C) (07/14/25 1115)  Pulse: 61 (07/14/25 1115)  Resp: 18 (07/14/25 1115)  BP: (!) 167/76 (07/14/25 1115)  SpO2: 98 % (07/14/25 1115) Vital Signs (24h Range):  Temp:  [97.6 °F (36.4 °C)-98.9 °F (37.2 °C)] 98.9 °F (37.2 °C)  Pulse:  [44-81] 61  Resp:  [10-29] 18  SpO2:  [90 %-100 %] 98 %  BP: (145-219)/() 167/76     Weight: 87.7 kg (193 lb 5.5 oz)  Body mass index is 25.51 kg/m².    Estimated Creatinine Clearance: 23.1 mL/min (A) (based on SCr of 3.5 mg/dL (H)).     Physical Exam  Constitutional:       Appearance: Normal appearance.   HENT:      Head: Normocephalic and atraumatic.      Nose: Nose normal.      Mouth/Throat:      Mouth: Mucous membranes are moist.   Eyes:      Extraocular Movements: Extraocular movements intact.      Pupils: Pupils are equal, round, and reactive to light.   Cardiovascular:      Rate and Rhythm: Normal rate and regular rhythm.      Heart sounds: No murmur heard.     No gallop.   Pulmonary:      Effort: Pulmonary effort is normal.      Breath sounds: Normal breath sounds. No wheezing or rales.   Abdominal:      General: Abdomen is flat. Bowel sounds are normal. There is no distension.      Palpations: Abdomen is soft.      Tenderness: There is no abdominal tenderness.   Musculoskeletal:         General: No swelling or tenderness. Normal range of motion.      Cervical back: Normal range of motion and neck supple.      Right lower leg: Edema present.      Left lower leg: Edema present.   Skin:     General: Skin is warm and dry.      Capillary Refill: Capillary refill takes less than 2 seconds.   Neurological:      General: No focal deficit present.      Mental Status: He is alert. Mental status is at baseline.   Psychiatric:         Mood and Affect: Mood normal.          Significant Labs: All pertinent  labs within the past 24 hours have been reviewed.    Significant Imaging: CT: I have reviewed all pertinent results/findings within the past 24 hours:

## 2025-07-14 NOTE — ASSESSMENT & PLAN NOTE
Patient has paroxysmal (<7 days) atrial fibrillation. Patient is currently in atrial fibrillation. [id number] Score: 3. The patients heart rate in the last 24 hours is as follows:  Pulse  Min: 44  Max: 81  Antiarrhythmics     Anticoagulants  rivaroxaban tablet 15 mg, With dinner, Oral    Plan  - Replete lytes with a goal of K>4, Mg >2  - Patient is anticoagulated, see medications listed above.  - Patient's afib is currently controlled  - afib recurrence after recent ablation, consider EP consult if symptomatic

## 2025-07-14 NOTE — NURSING
Pt ambulated to bathroom with SBA for hygenic ADL's. Pt noted to have a limp on left leg, pt endorsed pain on this foot. Per pt he is aware of is PAD history but states that though his legs may hurt the pain has never traveled down to his foot. RN notified attending Dr. Rodriguez via secure chat.

## 2025-07-14 NOTE — ASSESSMENT & PLAN NOTE
70 y/o M h/o kidney transplant in 2016, CKD IV, HFpEF, recurrent hospitalizations for PNA who presented for lightheadedness and dizziness. Found febrile hypertensive at ED, admitted in the setting of hypertensive emergency with HFpEF exacerbation. Treated with IV diuretics and antihypertensives. CT with GGO on RUL. Started on Vanc CTX Flagyl.  ID consulted for PNA in patient with history of Kidney transplant.  RIP negative,  BxCX NGTD. WBC WNL, Currently treated with CTX and flagyl. Given, kidney transplant, chronic immunosupression patient is at high risk for atypical bacteria infection. Recommend appropiate coverage.  Given absence of empyema, intrapulmonary abscess there is no indication to cover anaerobic bacteria.    Plan:   - start doxycicline 100 mg BID oral. (EOT 7/20)  - D/C CTX, transition to oral augmentin 875/125 to complete a 7 day course.  (7/18)  - stop flagyl  - follow blood cultures.   - Recommend SLP evaluation to assess for oral toleration of medications.

## 2025-07-14 NOTE — ASSESSMENT & PLAN NOTE
Patient has a current diagnosis of Hypertensive emergency with end organ damage evidenced by acute kidney injury which is controlled.  Latest blood pressure and vitals reviewed-   Temp:  [97.6 °F (36.4 °C)-98.9 °F (37.2 °C)]   Pulse:  [44-81]   Resp:  [10-96]   BP: (145-219)/()   SpO2:  [90 %-100 %] .   Patient currently off IV antihypertensives.   Home meds for hypertension were reviewed and noted below.   -unfortunately holding ARB due to PHUONG and BB due to bradycardia   -cannot tolerate CCB   -will increase doxazosin and start hydralazine 100 mg TID and Imdur 60 with plans to increase to 90 if needed

## 2025-07-14 NOTE — PROGRESS NOTES
"[facility] - Acute Medical Stepdown  Endocrinology  Progress Note    Admit Date: 2025     Reason for Consult: Management of T2DM, Hyperglycemia      Diabetes diagnosis year:       Home Diabetes Medications:    OMNIPOD 5  0.6 u/hr mn-0600 , 9p-mn, 0.7 u/hr 6a-9p   Target 120-130 mg/dl   Iob 3 hours  Max bolus 20 units   Max basal 2 u/hr   Isf 40 mn-mn  ICR 1 to 7.5      Presets:  Snack: 4 units (28g)  Small Meal: 8 units (56g)  Medium Meal: 12 units (90g)  Large Meal: 15 units (114g)  Super Large Meal: 18 units (130g)     How often checking glucose at home? >4 x day   BG readings on regimen: 150s  Hypoglycemia on the regimen?  No  Missed doses on regimen?  No     Diabetes Complications include:     Hyperglycemia     Complicating diabetes co morbidities:   S/p Kidney tx          HPI:Ravi Zamorano is a 69 y.o. male s/p kidney transplant in 2016 on tacro, CKD 4, HFpEF, T2DM with insulin pump use, afib on xarelto and AICD being admitted to hospital medicine for manangement of PHUONG on CKD and CHF exacerbation. Patient reports increased feelings of lightheadedness/dizziness with leaning forward the past several days. Endorses associated unsteady gait and bilateral "hand twitching". In the ED hypertensive to 230/98, febrile to Tmax 101.4 F. Endocrine consulted to manage hyperglycemia, type 2 diabetes, and insulin pump therapy  in the context of the inpatient setting.     Lab Results   Component Value Date    HGBA1C 6.5 (H) 2025           Interval HPI:   No acute events overnight. Patient in room 27789/03597 A. Blood glucose stable. BG at and above goal on current insulin regimen (Transition Insulin Drip). Steroid use- Prednisone 5 mg daily.    Renal function- Abnormal    Lab Results   Component Value Date    CREATININE 3.9 (H) 2025     Vasopressors-  None     Endocrine will continue to follow and manage insulin orders inpatient.     Diet Low Sodium (2 gm) Fluid - 1500mL     Eatin%  Nausea: " "No  Hypoglycemia and intervention: No  Fever: No  TPN and/or TF: No  If yes, type of TF/TPN and rate: N/A    BP (!) 198/88 (BP Location: Right arm)   Pulse 74   Temp 98.4 °F (36.9 °C) (Oral)   Resp 16   Ht 6' 1" (1.854 m)   Wt 87.7 kg (193 lb 5.5 oz)   SpO2 100%   BMI 25.51 kg/m²     Labs Reviewed and Include    Recent Labs   Lab 07/13/25 2133   K 4.1     Lab Results   Component Value Date    WBC 6.36 07/13/2025    HGB 8.5 (L) 07/13/2025    HCT 30.4 (L) 07/13/2025    MCV 77 (L) 07/13/2025     (L) 07/13/2025     Recent Labs   Lab 07/12/25 2021   TSH 1.684     Lab Results   Component Value Date    HGBA1C 6.5 (H) 07/13/2025       Nutritional status:   Body mass index is 25.51 kg/m².  Lab Results   Component Value Date    ALBUMIN 3.1 (L) 07/12/2025    ALBUMIN 3.4 (L) 07/07/2025    ALBUMIN 3.1 (L) 06/27/2025     No results found for: "PREALBUMIN"    Estimated Creatinine Clearance: 20.8 mL/min (A) (based on SCr of 3.9 mg/dL (H)).    Accu-Checks  Recent Labs     07/13/25  0811 07/13/25  1423 07/13/25  1542 07/13/25  1709 07/13/25  2125 07/14/25  0251   POCTGLUCOSE 173* 272* 253* 278* 279* 183*       Current Medications and/or Treatments Impacting Glycemic Control  Immunotherapy:    Immunosuppressants           Stop Route Frequency     tacrolimus capsule 1 mg         -- Oral 2 times daily          Steroids:   Hormones (From admission, onward)      Start     Stop Route Frequency Ordered    07/13/25 0900  predniSONE tablet 5 mg         -- Oral Daily 07/13/25 0543    07/13/25 0138  melatonin tablet 6 mg         -- Oral Nightly PRN 07/13/25 0040          Pressors:    Autonomic Drugs (From admission, onward)      None          Hyperglycemia/Diabetes Medications:   Antihyperglycemics (From admission, onward)      Start     Stop Route Frequency Ordered    07/13/25 1645  insulin aspart U-100 pen 2 Units         -- SubQ 3 times daily with meals 07/13/25 1232    07/13/25 1345  insulin regular in 0.9 % NaCl 100 " unit/100 mL (1 unit/mL) infusion        Question:  Enter initial dose (Units/hr):  Answer:  0.4    -- IV Continuous 07/13/25 1232    07/13/25 1332  insulin aspart U-100 pen 0-5 Units         -- SubQ Every 4 hours PRN 07/13/25 1232            ASSESSMENT and PLAN    Cardiac/Vascular  * Acute on chronic diastolic congestive heart failure  Managed per primary team  Avoid hypoglycemia        Hyperlipidemia associated with type 2 diabetes mellitus  On statin therapy per ADA guidelines.   Atorvastatin 80mg daily        Endocrine  Type 2 diabetes mellitus with stage 4 chronic kidney disease, with long-term current use of insulin  Endocrinology consulted for BG management.   BG goal 140-180     - Not a candidate for insulin pump therapy at this time.   - Patient does not have his insulin pump supplies. However, his wife will arrive tonight. Instructed patient that his wife needs to bring his Omnipod kit, insulin vials, and Dexcom sensors.   - Will keep patient on his current regimen below until he has his pump and CGM supplies.    - Transition drip at 0.4 units/hr with step-down parameters   - Novolog (aspart) insulin 5 Units SQ TIDWM and prn for BG excursions LDC SSI (150/50).  - BG checks AC/HS/0200  - Hypoglycemia protocol in place    ** Please notify Endocrine for any change and/or advance in diet**  ** Please call Endocrine for any BG related issues **    Discharge Planning:   TBD. Please notify endocrinology prior to discharge.

## 2025-07-14 NOTE — PLAN OF CARE
POC updated and reviewed with the patient and spouse at bedside. Questions regarding POC were encouraged and addressed. VSS with improvement in bradycardia and hypertension from last night, see flowsheets. Tele maintained per provider's order. Patient is AO x 4 at this time. No acute events noted during shift. Pain/Nausea management using PRN medications. See MAR for medication administration details. Fall, and safety precautions maintained. No signs of injury noted. Upon exiting room, patient's bed locked in low position, side rails up x 3, bed alarm on, with call light within reach. Instructed patient to call staff for mobility, verbalized understanding.  No acute signs of distress noted at this time. Pt seen by ID and transitioned off IV abx to oral. Urology consulted for left renal mass, pulmonology consulted for recurrent pneumonia. Pt still not in the mood to ambulate today, states he will try tomorrow.     Problem: Adult Inpatient Plan of Care  Goal: Plan of Care Review  Outcome: Progressing  Goal: Patient-Specific Goal (Individualized)  Outcome: Progressing  Goal: Absence of Hospital-Acquired Illness or Injury  Outcome: Progressing  Goal: Optimal Comfort and Wellbeing  Outcome: Progressing  Goal: Readiness for Transition of Care  Outcome: Progressing     Problem: Infection  Goal: Absence of Infection Signs and Symptoms  Outcome: Progressing     Problem: Diabetes Comorbidity  Goal: Blood Glucose Level Within Targeted Range  Outcome: Progressing     Problem: Acute Kidney Injury/Impairment  Goal: Fluid and Electrolyte Balance  Outcome: Progressing  Goal: Improved Oral Intake  Outcome: Progressing  Goal: Effective Renal Function  Outcome: Progressing     Problem: Pneumonia  Goal: Fluid Balance  Outcome: Progressing  Goal: Resolution of Infection Signs and Symptoms  Outcome: Progressing  Goal: Effective Oxygenation and Ventilation  Outcome: Progressing     Problem: Fall Injury Risk  Goal: Absence of Fall and  Fall-Related Injury  Outcome: Progressing     Problem: Dysrhythmia  Goal: Normalized Cardiac Rhythm  Outcome: Progressing     Problem: Heart Failure  Goal: Optimal Coping  Outcome: Progressing  Goal: Optimal Cardiac Output  Outcome: Progressing  Goal: Stable Heart Rate and Rhythm  Outcome: Progressing  Goal: Optimal Functional Ability  Outcome: Progressing  Goal: Fluid and Electrolyte Balance  Outcome: Progressing  Goal: Improved Oral Intake  Outcome: Progressing  Goal: Effective Oxygenation and Ventilation  Outcome: Progressing  Goal: Effective Breathing Pattern During Sleep  Outcome: Progressing

## 2025-07-14 NOTE — NURSING
Pt refused ambulation requests, stated he would rather wait until tomorrow. RN advised importance of early ambulation, pt verbalized understanding but still refusing at this time.

## 2025-07-14 NOTE — PROGRESS NOTES
Pharmacokinetic Assessment Follow Up: IV Vancomycin    Therapy with vancomycin complete and/or consult discontinued by provider. Pharmacy will sign off, please re-consult as needed.    Alexi Leahy, PharmD, BCPS

## 2025-07-14 NOTE — PLAN OF CARE
I have reviewed the chart of Ravi Zamorano who is hospitalized for the following:    Active Hospital Problems    Diagnosis    *Acute on chronic diastolic congestive heart failure    Pneumonia    Renal mass    Hypertension associated with stage 4 chronic kidney disease due to type 2 diabetes mellitus    Febrile illness, acute    Hypertensive emergency    Paroxysmal atrial fibrillation    Long-term use of immunosuppressant medication    Acute kidney injury superimposed on stage 4 chronic kidney disease    Insulin pump in place    Pleural effusion  - in setting of CHF exacerbation; CT with bilateral small effusions. Continue with diuresis    Hypokalemia    History of CVA (cerebrovascular accident)    Long term (current) use of anticoagulants    Anemia of chronic disease     H/H stable, continue to monitor       Thrombocytopenia, unspecified    Hypercoagulable state due to paroxysmal atrial fibrillation  - continue xarelto    CKD IV (severe)    Immunocompromised state due to drug therapy    S/P cadaveric kidney transplant 11/5/2016. ESRD secondary to HTN/DMII    Hyperlipidemia associated with type 2 diabetes mellitus    Type 2 diabetes mellitus with stage 4 chronic kidney disease, with long-term current use of insulin        Jaz Rodriguez PA-C  [facility]

## 2025-07-14 NOTE — ASSESSMENT & PLAN NOTE
- CHF pathway initiated   - CXR revealed Cardiomegaly with interstitial edema,   - BNP 4,888 / troponin 205>>220  - EKG with atrial fibrillation recurrence (recent ablation 04/25)  - last echo showing ejection fraction of 50 - 55%.   - repeat TTE ordered   -  continue Lasix 60 mg BID   - strict Is/Os   - cardiac, low sodium diet  - fluid restrict 1.5L while inpatient  - daily CMP   - monitor on cardiac telemetry   - supplemental O2 as needed

## 2025-07-14 NOTE — CONSULTS
[facility] - Acute Medical Stepdown  Pulmonology  Consult Note    Patient Name: Ravi Zamorano  MRN: [id number]  Admission Date: 7/12/2025  Hospital Length of Stay: 1 days  Code Status: Full Code  Attending Physician: Celine Rodriguez MD  Primary Care Provider: Mima Mack MD   Principal Problem: Acute on chronic diastolic congestive heart failure    Inpatient consult to Pulmonology  Consult performed by: Shane Gunn DO  Consult ordered by: Celine Rodriguez MD        Subjective:     HPI:  This is a 70 yo male with a PMH of s/p kidney transplant in 2016 (on tacrolimus), CKD 4, HFpEF, T2DM on insulin pump, atrial fibrillation (on xarelto), and an AICD who presented to the ED with a concern for dizziness. He states he has been feeling dizzy for the past several days before admission. The most recent occurrence was when he was brushing his teeth and then taking a shower, he felt himself slowly leaning forward on both instances. He denies any loss of consciousness. He states he did bump his head in the shower but from a slow lean forward. He denies any shortness of breath or any other symptoms at this time.     In the ED, the pt was hypertensive 230/98, febrile with a temp of 101.4 F. Was found to have PHUONG on CKD with a creatinine of 3.9, baseline 3.1). BNP was elevated at 4,888. The pt denied feeling fever or chills, recent sick contacts, exposure to animals at home or mold. In addition pt denies having any fevers at home, trouble swallowing, or choking on food or liquids.     Pulmonology was consulted for recurrent aspiration pneumonia.     Past Medical History:   Diagnosis Date    Anxiety 07/29/2014    Arthritis     atrial fibrillation     History of cardioversion    Bilateral diabetic retinopathy 2017    Cardioembolic stroke 12/07/2024    almost completely resolved - very mild left hand weakness    CKD (chronic kidney disease)     in transplanted kidney    Colon polyps 2014    Depression -  situational 07/29/2014    Diabetes type 2 2000    since 2000.  c/b neuropathy, CKD    Encounter for blood transfusion     History of hepatitis C, s/p successful Rx w/ SVR24 - 9/2017 07/29/2014    Genotype 1a, treatment naive 10/2014 liver biopsy - grade 1 / stage 1 Completed Harvoni w/ SVR    Hyperlipidemia 07/29/2014    Hypertension     Hyponatremia 02/20/2025    Pancreatitis     S/P cadaveric kidney transplant 11/5/2016. ESRD secondary to HTN/DMII 11/05/2016    Stroke 2024       Past Surgical History:   Procedure Laterality Date    ABLATION OF ARRHYTHMOGENIC FOCUS FOR ATRIAL FIBRILLATION N/A 6/11/2024    Procedure: Ablation atrial fibrillation;  Surgeon: Bronson Bowden MD;  Location: [facility] EP LAB;  Service: Cardiology;  Laterality: N/A;  AF, FELICIANO (cx if SR), PVI, RFA, Carto, Gen, GP, 3 Prep    ABLATION OF ARRHYTHMOGENIC FOCUS FOR ATRIAL FIBRILLATION N/A 6/12/2025    Procedure: Ablation atrial fibrillation;  Surgeon: Bronson Bowden MD;  Location: [facility] EP LAB;  Service: Cardiology;  Laterality: N/A;  AF, FELICIANO (h/o CVA), redo PVI, CTI, RFA, Carto, Gen, GP, 3 Prep *insulin pump*    ABLATION, ATRIAL FLUTTER, ATYPICAL  6/12/2025    Procedure: Ablation, Atrial Flutter, Atypical;  Surgeon: Bronson Bowden MD;  Location: [facility] EP LAB;  Service: Cardiology;;    ABLATION, ATRIAL FLUTTER, TYPICAL  6/11/2024    Procedure: Ablation, Atrial Flutter, Typical;  Surgeon: Bronson Bowden MD;  Location: [facility] EP LAB;  Service: Cardiology;;    ABLATION, ATRIAL FLUTTER, TYPICAL N/A 6/12/2025    Procedure: Ablation, Atrial Flutter, Typical;  Surgeon: Bronson Bowden MD;  Location: [facility] EP LAB;  Service: Cardiology;  Laterality: N/A;    APPENDECTOMY      BONE MARROW BIOPSY Left 6/26/2024    Procedure: Biopsy-bone marrow;  Surgeon: Bridger Zapata MD;  Location: [facility] ENDO (4TH FLR);  Service: Oncology;  Laterality: Left;  6/24-pt knows to hold aspirin and eliquis as instructed by hem/onc, precall complete-Kpvt    CARDIOVERSION   6/11/2024    Procedure: Cardioversion;  Surgeon: Bronson Bowden MD;  Location: [facility] EP LAB;  Service: Cardiology;;    CATARACT EXTRACTION W/  INTRAOCULAR LENS IMPLANT Right 3/13/2024    Procedure: EXTRACTION, CATARACT, WITH IOL INSERTION;  Surgeon: Jennifer Palacio MD;  Location: [facility] OR;  Service: Ophthalmology;  Laterality: Right;    CATARACT EXTRACTION W/  INTRAOCULAR LENS IMPLANT Left 4/10/2024    Procedure: EXTRACTION, CATARACT, WITH IOL INSERTION;  Surgeon: Jennifer Palacio MD;  Location: [facility] OR;  Service: Ophthalmology;  Laterality: Left;    COLONOSCOPY N/A 12/21/2020    Procedure: COLONOSCOPY;  Surgeon: Tico Bell MD;  Location: [facility] ENDO ([address] [place] [address]);  Service: Endoscopy;  Laterality: N/A;  ok to hold eliquis per Dr. Valadez, see telephone encounter 11/13/2020-[place]  covid test 12/18 [place]    ECHOCARDIOGRAM,TRANSESOPHAGEAL N/A 2/3/2025    Procedure: Transesophageal echo (FELICIANO) intra-procedure log documentation;  Surgeon: Grayson Lin III, MD;  Location: [facility] EP LAB;  Service: Cardiology;  Laterality: N/A;    KIDNEY TRANSPLANT      TRANSESOPHAGEAL ECHOCARDIOGRAPHY N/A 8/26/2019    Procedure: ECHOCARDIOGRAM, TRANSESOPHAGEAL;  Surgeon: [facility] Diagnostic Provider;  Location: [facility] EP LAB;  Service: Cardiology;  Laterality: N/A;    TRANSESOPHAGEAL ECHOCARDIOGRAPHY N/A 6/11/2024    Procedure: ECHOCARDIOGRAM, TRANSESOPHAGEAL;  Surgeon: Grayson Lin III, MD;  Location: [facility] EP LAB;  Service: Cardiology;  Laterality: N/A;    TRANSESOPHAGEAL ECHOCARDIOGRAPHY N/A 6/12/2025    Procedure: ECHOCARDIOGRAM, TRANSESOPHAGEAL;  Surgeon: Breezy Nguyen MD;  Location: [facility] EP LAB;  Service: Cardiology;  Laterality: N/A;    TREATMENT OF CARDIAC ARRHYTHMIA N/A 8/26/2019    Procedure: CARDIOVERSION;  Surgeon: Bronson Bowden MD;  Location: [facility] EP LAB;  Service: Cardiology;  Laterality: N/A;  AF, FELICIANO, DCCV, MAC, GP, 3 PREP    TREATMENT OF CARDIAC ARRHYTHMIA N/A 2/3/2025    Procedure: Cardioversion or  Defibrillation;  Surgeon: Bronson Bowden MD;  Location: [facility] EP LAB;  Service: Cardiology;  Laterality: N/A;  AF, FELICIANO, DCCV, MAC, GP, 3 Prep       Review of patient's allergies indicates:   Allergen Reactions    Nifedipine Other (See Comments)     Gingival hyperplasia       Family History       Problem Relation (Age of Onset)    Cancer Brother    Diabetes Mother    Heart disease Mother, Father    Hypertension Mother, Brother          Tobacco Use    Smoking status: Former     Current packs/day: 0.00     Average packs/day: 0.5 packs/day for 32.0 years (16.0 ttl pk-yrs)     Types: Cigarettes     Start date: 1984     Quit date: 2016     Years since quittin.6    Smokeless tobacco: Never   Vaping Use    Vaping status: Never Used   Substance and Sexual Activity    Alcohol use: Not Currently    Drug use: No    Sexual activity: Yes     Partners: Female         Review of Systems   Constitutional:  Negative for chills, diaphoresis, fever (Febrile on admission, but did not feel feverish) and unexpected weight change.   HENT:  Negative for congestion, sore throat and trouble swallowing.    Eyes:  Negative for visual disturbance.   Respiratory:  Negative for cough.    Cardiovascular:  Negative for chest pain.   Gastrointestinal:  Negative for nausea and vomiting.   Endocrine: Negative for polydipsia and polyuria.   Musculoskeletal:  Negative for back pain.   Skin:  Negative for rash.   Neurological:  Positive for dizziness.     Objective:     Vital Signs (Most Recent):  Temp: 97.2 °F (36.2 °C) (25 1639)  Pulse: 73 (25 1639)  Resp: 18 (25 1639)  BP: (!) 170/70 (25 1639)  SpO2: 100 % (25 1639) Vital Signs (24h Range):  Temp:  [97.2 °F (36.2 °C)-98.9 °F (37.2 °C)] 97.2 °F (36.2 °C)  Pulse:  [44-81] 73  Resp:  [10-29] 18  SpO2:  [95 %-100 %] 100 %  BP: (145-214)/() 170/70     Weight: 87.7 kg (193 lb 5.5 oz)  Body mass index is 25.51 kg/m².      Intake/Output Summary (Last 24  hours) at 7/14/2025 1646  Last data filed at 7/14/2025 1346  Gross per 24 hour   Intake 1061.67 ml   Output 1700 ml   Net -638.33 ml        Physical Exam  Vitals and nursing note reviewed.   Constitutional:       General: He is not in acute distress.     Appearance: He is not toxic-appearing.   HENT:      Head: Normocephalic and atraumatic.   Eyes:      Extraocular Movements: Extraocular movements intact.      Conjunctiva/sclera: Conjunctivae normal.      Pupils: Pupils are equal, round, and reactive to light.   Cardiovascular:      Rate and Rhythm: Normal rate.      Heart sounds: Normal heart sounds. No murmur heard.     No gallop.   Pulmonary:      Effort: Pulmonary effort is normal.      Breath sounds: Normal breath sounds.   Abdominal:      General: Abdomen is flat.      Palpations: Abdomen is soft.   Musculoskeletal:         General: No swelling.      Cervical back: Normal range of motion.      Right lower leg: No edema.      Left lower leg: No edema.   Skin:     General: Skin is warm and dry.   Neurological:      General: No focal deficit present.      Mental Status: He is alert and oriented to person, place, and time. Mental status is at baseline.   Psychiatric:         Mood and Affect: Mood normal.         Behavior: Behavior normal.          Vents:       Lines/Drains/Airways       Peripheral Intravenous Line  Duration             Peripheral IV 07/12/25 2057 20 G Right Antecubital 1 day    Peripheral IV Single Lumen 07/13/25 1436 22 G Anterior;Distal;Left Upper Arm 1 day                    Significant Labs:    CBC/Anemia Profile:  Recent Labs   Lab 07/12/25 2021 07/13/25 0311 07/14/25  0739   WBC 7.54 6.36 5.82   HGB 8.9* 8.5* 9.0*   HCT 30.2* 30.4* 30.4*   * 118* 134*   MCV 74* 77* 72*   RDW 19.3* 19.6* 19.1*        Chemistries:  Recent Labs   Lab 07/12/25 2021 07/13/25 0311 07/13/25 2133 07/14/25  0739    135*  --  137   K 4.3 3.3* 4.1 3.4*    101  --  101   CO2 22* 22*  --  24   BUN  "68* 66*  --  61*   CREATININE 3.9* 3.9*  --  3.5*   CALCIUM 8.7 8.2*  --  8.5*   ALBUMIN 3.1*  --   --   --    PROT 7.0  --   --   --    BILITOT 0.5  --   --   --    ALKPHOS 54  --   --   --    ALT <5*  --   --   --    AST 23  --   --   --    MG 2.2 1.9 2.2 1.9   PHOS 3.3 3.1 3.8 3.4       ABGs: No results for input(s): "PH", "PCO2", "HCO3", "POCSATURATED", "BE" in the last 48 hours.  Blood Culture: No results for input(s): "LABBLOO" in the last 48 hours.  BMP:   Recent Labs   Lab 07/14/25  0739   *      K 3.4*      CO2 24   BUN 61*   CREATININE 3.5*   CALCIUM 8.5*   MG 1.9     Lactic Acid:   Recent Labs   Lab 07/12/25 2021   LACTATE 1.9     Respiratory Culture: No results for input(s): "GSRESP", "RESPIRATORYC" in the last 48 hours.  Urine Culture: No results for input(s): "LABURIN" in the last 48 hours.  All pertinent labs within the past 24 hours have been reviewed.    Significant Imaging:   I have reviewed all pertinent imaging results/findings within the past 24 hours.  Assessment/Plan:     Pulmonary  Pneumonia  This is a 68 yo male with a PMH of kidney transplant in 2016, CKD4, HFpEF, T2DM, Afib admitted with acute on chronic diastolic heart failure with diastolic dysfunction and diagnosis of pneumonia.     Pt presented to the ED with a fever of 101.4 F   CT Abdomen/Chest w/o contrast revealed:   -Multifocal ground-glass and nodular airspace opacities, with more confluent appearance in the posterior right upper lobe.  Findings are concerning for multifocal pneumonia versus aspiration.  Interval improvement in left lung consolidations.  Stable bilateral small pleural effusions.   -Concerns of recurrent aspiration pneumonia from previous imaging from previous hospitalizations are more likely pleural effusion. Imaging from this current stay more consistent with pneumonia. Pt was started on antibiotic therapy of rocephin and flagyl, had improvement of symptoms and no recurrent fever.    Plan: "   - Defer antibiotic management to Infectious Disease, they recommend change ceftriaxone/flagy to augmentin on discharge, complete total 7d course for atypical pneumonia treatment.  - Concerns of aspiration pneumonia. Bed side swallow was performed.   -Oral and pharyngeal swallowing function appears to be WFL. Previous bedside swallowing assessments and FEES have also revealed no concerns for quan-pharyngeal dysphagia   - Recommend Esophagram to assess swallowing function.                       Thank you for your consult. I will sign off. Please contact us if you have any additional questions.     Shane Gunn, DO  Pulmonology  [facility] - Acute Medical Stepdown

## 2025-07-14 NOTE — SUBJECTIVE & OBJECTIVE
Objective:     Vital Signs (Most Recent):  Temp: 98 °F (36.7 °C) (07/14/25 0308)  Pulse: 74 (07/14/25 0707)  Resp: 16 (07/14/25 0707)  BP: (!) 205/89 (post hydralazine check hour x1) (07/14/25 0707)  SpO2: 100 % (07/14/25 0707) Vital Signs (24h Range):  Temp:  [97.6 °F (36.4 °C)-98.5 °F (36.9 °C)] 98 °F (36.7 °C)  Pulse:  [44-88] 74  Resp:  [10-96] 16  SpO2:  [90 %-100 %] 100 %  BP: (145-229)/() 205/89     Weight: 87.7 kg (193 lb 5.5 oz)  Body mass index is 25.51 kg/m².     Physical Exam  Constitutional:       General: He is not in acute distress.     Appearance: He is well-developed.   HENT:      Head: Normocephalic.   Cardiovascular:      Rate and Rhythm: Normal rate and regular rhythm.      Heart sounds: Normal heart sounds.   Pulmonary:      Effort: Pulmonary effort is normal. No respiratory distress.   Musculoskeletal:         General: Swelling present. Normal range of motion.   Skin:     General: Skin is warm.   Neurological:      Mental Status: He is alert.        Significant Labs: All pertinent labs within the past 24 hours have been reviewed.  CBC:   Recent Labs   Lab 07/12/25 2021 07/13/25 0311   WBC 7.54 6.36   HGB 8.9* 8.5*   HCT 30.2* 30.4*   * 118*     CMP:   Recent Labs   Lab 07/12/25 2021 07/13/25 0311 07/13/25  2133    135*  --    K 4.3 3.3* 4.1    101  --    CO2 22* 22*  --    * 152*  --    BUN 68* 66*  --    CREATININE 3.9* 3.9*  --    CALCIUM 8.7 8.2*  --    PROT 7.0  --   --    ALBUMIN 3.1*  --   --    BILITOT 0.5  --   --    ALKPHOS 54  --   --    AST 23  --   --    ALT <5*  --   --    ANIONGAP 14 12  --      Cardiac Markers:   Recent Labs   Lab 07/12/25 2021   BNP 4,888*     Troponin:   Recent Labs   Lab 07/12/25 2021 07/13/25  0058 07/13/25  0311   TROPONINIHS 205* 224* 220*     TSH:   Recent Labs   Lab 07/12/25 2021   TSH 1.684     Urine Studies:   Recent Labs   Lab 07/12/25  2351   COLORU Colorless*   APPEARANCEUA Clear   PHUR 8.0   SPECGRAV 1.010    PROTEINUA 2+*   GLUCUA 3+*   BILIRUBINUA Negative   OCCULTUA Negative   NITRITE Negative   UROBILINOGEN Negative   LEUKOCYTESUR Negative   RBCUA 1   WBCUA <1   BACTERIA Rare   HYALINECASTS 0

## 2025-07-14 NOTE — ASSESSMENT & PLAN NOTE
The patients 3 most recent labs are listed below.  Recent Labs     07/12/25 2021 07/13/25  0311 07/14/25  0739   * 118* 134*     Plan  - Will transfuse if platelet count is <10k.  - daily CBC

## 2025-07-14 NOTE — ASSESSMENT & PLAN NOTE
This is a 68 yo male with a PMH of kidney transplant in 2016, CKD4, HFpEF, T2DM, Afib admitted with acute on chronic diastolic heart failure with diastolic dysfunction and diagnosis of pneumonia.     Pt presented to the ED with a fever of 101.4 F.. CT abdomen.   CT Abdomen/Chest w/o contrast revealed:   Multifocal ground-glass and nodular airspace opacities, with more confluent appearance in the posterior right upper lobe.  Findings are concerning for multifocal pneumonia versus aspiration.  Interval improvement in left lung consolidations.  Stable bilateral small pleural effusions.   Concerns of recurrent aspiration pneumonia from previous imaging from previous hospitalizations are more likely pleural effusion. Imaging from this current stay more consistent with pneumonia. Pt was started on antibiotic therapy of rocephin and flagyl, had improvement of symptoms and no recurrent fever.    Plan:   -Infectious Disease will change ceftriaxone/flagy to augmentin on discharge, complete total 7d course.  - Concerns of aspiration pneumonia. Bed side swallow was performed.   -Oral and pharyngeal swallowing function appears to be WFL. Previous bedside swallowing assessments and FEES   have also revealed no concerns for quan-pharyngeal dysphagia

## 2025-07-14 NOTE — ASSESSMENT & PLAN NOTE
Unclear etiology, patient without symptoms to guide possible cause  - febrile to 101.4F on admission  - CBC without leukocytosis  - CMP with PHUONG  - UA non infectious  - lactic acid and procal wnl  - RIP negative   - BCX ordered  - CXR without consolidation  - CT head without acute process  -CT C/A/P concerning for asp vs multifocal PNA and renal mass    -possible component of malignancy causing fevers   -transplant ID following   -continue Rocephin and flagyl

## 2025-07-14 NOTE — ASSESSMENT & PLAN NOTE
Patient has paroxysmal (<7 days) atrial fibrillation. Patient is currently in atrial fibrillation. [id number] Score: 3. The patients heart rate in the last 24 hours is as follows:  Pulse  Min: 44  Max: 81  Antiarrhythmics     Anticoagulants  rivaroxaban tablet 15 mg, With dinner, Oral    Plan  - Replete lytes with a goal of K>4, Mg >2  - Patient is anticoagulated, see medications listed above.  - Patient's afib is currently controlled  - afib recurrence after recent ablation, consider EP consult if symptomatic

## 2025-07-14 NOTE — ASSESSMENT & PLAN NOTE
Patient's most recent potassium results are listed below.   Recent Labs     07/13/25  0311 07/13/25  2133 07/14/25  0739   K 3.3* 4.1 3.4*     Plan  - Replete potassium per protocol  - Monitor potassium Daily  - Patient's hypokalemia is stable

## 2025-07-14 NOTE — PLAN OF CARE
Pt bradycardic to 40s while awake, alert, and talking on phone, and hypertensive with SBP 180s-200s throughout the night, and asymptomatic- MD, charge nurse, and rapid team notified. Medications given and monitored- see orders. All other VSS on RA. No other acute events overnight. Bed low and locked. Side rails up x 2 . Call light and bedside table in reach.           Problem: Adult Inpatient Plan of Care  Goal: Plan of Care Review  Outcome: Ongoing  Goal: Patient-Specific Goal (Individualized)  Outcome: Ongoing  Goal: Absence of Hospital-Acquired Illness or Injury  Outcome: Ongoing  Goal: Optimal Comfort and Wellbeing  Outcome: Ongoing  Goal: Readiness for Transition of Care  Outcome: Ongoing     Problem: Infection  Goal: Absence of Infection Signs and Symptoms  Outcome: Ongoing     Problem: Diabetes Comorbidity  Goal: Blood Glucose Level Within Targeted Range  Outcome: Ongoing     Problem: Acute Kidney Injury/Impairment  Goal: Fluid and Electrolyte Balance  Outcome: Ongoing  Goal: Improved Oral Intake  Outcome: Ongoing  Goal: Effective Renal Function  Outcome: Ongoing     Problem: Pneumonia  Goal: Fluid Balance  Outcome: Ongoing  Goal: Resolution of Infection Signs and Symptoms  Outcome: Ongoing  Goal: Effective Oxygenation and Ventilation  Outcome: Ongoing     Problem: Fall Injury Risk  Goal: Absence of Fall and Fall-Related Injury  Outcome: Ongoing

## 2025-07-14 NOTE — PROGRESS NOTES
[facility]  Infectious Disease  Progress Note    Patient Name: Ravi Zamorano  MRN: [id number]  Admission Date: 7/12/2025  Length of Stay: 1 days  Attending Physician: Celine Rodriguez MD  Primary Care Provider: Mima Mack MD    Isolation Status: No active isolations  Assessment/Plan:      Pulmonary  Community acquired bacterial pneumonia  68 y/o M h/o kidney transplant in 2016, CKD IV, HFpEF, recurrent hospitalizations for PNA who presented for lightheadedness and dizziness. Found febrile hypertensive at ED, admitted in the setting of hypertensive emergency with HFpEF exacerbation. Treated with IV diuretics and antihypertensives. RIP was negative. CT with GGO on RUL. Started on Vanc CTX Flagyl.  ID consulted for PNA in patient with history of Kidney transplant.      Patient is clinically improving indicated by normal WBC, downtrending fever, amelioration of consitutional symptoms. Currently treated with CTX and flagyl. Given, kidney transplant, chronic immunosupression patient is at high risk for atypical bacteria infection. Recommend appropiate coverage.  Given absence of empyema, intrapulmonary abscess there is no indication to cover anaerobic bacteria.    Plan:   - start doxycicline BID oral. (EOT 7/20)  - D/C CTX, transition to oral augmentin to complete a 7 day course.  (7/18)  - stop flagyl  - follow blood cultures.   - Recommend SLP evaluation to assess for oral toleration of medications.     Immunology/Multi System  Immunocompromised state due to drug therapy  Continue chronic IS prednisone and tacrolimus as indicated by Transplant Medicine.        Anticipated Disposition: TBD    Thank you for your consult. I will follow-up with patient. Please contact us if you have any additional questions.    Radames Rajput MD  Infectious Disease  [facility]    Subjective:     Principal Problem:Acute on chronic diastolic congestive heart failure    HPI:  "Ravi Zamorano is a 69-year-old man s/p kidney transplant in 2016 on tacrolimus, CKD stage 4, HFpEF, type 2 DM, afib on xarelto and AICD who originally presented to the ED for evaluation of feeling "off-balanced" while walking over the last 2 days. He was found to be hypertensive and febrile to 101.5F on admission. BNP and Cr elevated from baseline. He was admitted for PHUONG on CKD and CHF exacerbation. Of note, patient was admitted with suspected CAP in 4/2025, then 5/2025, and treated with antibiotics with improvement. Patient otherwise denies hip or back pain, dysuria, cough, chest pain, shortness of breath, abdominal pain, n/v, recent sick contact.    Infectious Disease consulted for "Fever, possible CAP Transplant pt."  Interval History: improved cough but still with SOB. Denies fevers, nausea, vomiting, diarrhea.     Review of Systems  Objective:     Vital Signs (Most Recent):  Temp: 98.9 °F (37.2 °C) (07/14/25 1115)  Pulse: 61 (07/14/25 1115)  Resp: 18 (07/14/25 1115)  BP: (!) 167/76 (07/14/25 1115)  SpO2: 98 % (07/14/25 1115) Vital Signs (24h Range):  Temp:  [97.6 °F (36.4 °C)-98.9 °F (37.2 °C)] 98.9 °F (37.2 °C)  Pulse:  [44-81] 61  Resp:  [10-29] 18  SpO2:  [90 %-100 %] 98 %  BP: (145-219)/() 167/76     Weight: 87.7 kg (193 lb 5.5 oz)  Body mass index is 25.51 kg/m².    Estimated Creatinine Clearance: 23.1 mL/min (A) (based on SCr of 3.5 mg/dL (H)).     Physical Exam  Constitutional:       Appearance: Normal appearance.   HENT:      Head: Normocephalic and atraumatic.      Nose: Nose normal.      Mouth/Throat:      Mouth: Mucous membranes are moist.   Eyes:      Extraocular Movements: Extraocular movements intact.      Pupils: Pupils are equal, round, and reactive to light.   Cardiovascular:      Rate and Rhythm: Normal rate and regular rhythm.      Heart sounds: No murmur heard.     No gallop.   Pulmonary:      Effort: Pulmonary effort is normal.      Breath sounds: Normal breath sounds. No " wheezing or rales.   Abdominal:      General: Abdomen is flat. Bowel sounds are normal. There is no distension.      Palpations: Abdomen is soft.      Tenderness: There is no abdominal tenderness.   Musculoskeletal:         General: No swelling or tenderness. Normal range of motion.      Cervical back: Normal range of motion and neck supple.      Right lower leg: Edema present.      Left lower leg: Edema present.   Skin:     General: Skin is warm and dry.      Capillary Refill: Capillary refill takes less than 2 seconds.   Neurological:      General: No focal deficit present.      Mental Status: He is alert. Mental status is at baseline.   Psychiatric:         Mood and Affect: Mood normal.          Significant Labs: All pertinent labs within the past 24 hours have been reviewed.    Significant Imaging: CT: I have reviewed all pertinent results/findings within the past 24 hours:

## 2025-07-14 NOTE — SUBJECTIVE & OBJECTIVE
Past Medical History:   Diagnosis Date    Anxiety 07/29/2014    Arthritis     atrial fibrillation     History of cardioversion    Bilateral diabetic retinopathy 2017    Cardioembolic stroke 12/07/2024    almost completely resolved - very mild left hand weakness    CKD (chronic kidney disease)     in transplanted kidney    Colon polyps 2014    Depression - situational 07/29/2014    Diabetes type 2 2000    since 2000.  c/b neuropathy, CKD    Encounter for blood transfusion     History of hepatitis C, s/p successful Rx w/ SVR24 - 9/2017 07/29/2014    Genotype 1a, treatment naive 10/2014 liver biopsy - grade 1 / stage 1 Completed Harvoni w/ SVR    Hyperlipidemia 07/29/2014    Hypertension     Hyponatremia 02/20/2025    Pancreatitis     S/P cadaveric kidney transplant 11/5/2016. ESRD secondary to HTN/DMII 11/05/2016    Stroke 2024       Past Surgical History:   Procedure Laterality Date    ABLATION OF ARRHYTHMOGENIC FOCUS FOR ATRIAL FIBRILLATION N/A 6/11/2024    Procedure: Ablation atrial fibrillation;  Surgeon: Bronson Bowden MD;  Location: [facility] EP LAB;  Service: Cardiology;  Laterality: N/A;  AF, FELICIANO (cx if SR), PVI, RFA, Carto, Gen, GP, 3 Prep    ABLATION OF ARRHYTHMOGENIC FOCUS FOR ATRIAL FIBRILLATION N/A 6/12/2025    Procedure: Ablation atrial fibrillation;  Surgeon: Bronson Bowden MD;  Location: [facility] EP LAB;  Service: Cardiology;  Laterality: N/A;  AF, FELICIANO (h/o CVA), redo PVI, CTI, RFA, Carto, Gen, GP, 3 Prep *insulin pump*    ABLATION, ATRIAL FLUTTER, ATYPICAL  6/12/2025    Procedure: Ablation, Atrial Flutter, Atypical;  Surgeon: Bronson Bowden MD;  Location: [facility] EP LAB;  Service: Cardiology;;    ABLATION, ATRIAL FLUTTER, TYPICAL  6/11/2024    Procedure: Ablation, Atrial Flutter, Typical;  Surgeon: Bronson Bowden MD;  Location: [facility] EP LAB;  Service: Cardiology;;    ABLATION, ATRIAL FLUTTER, TYPICAL N/A 6/12/2025    Procedure: Ablation, Atrial Flutter, Typical;  Surgeon: Bronson Bowden MD;  Location:  [facility] EP LAB;  Service: Cardiology;  Laterality: N/A;    APPENDECTOMY      BONE MARROW BIOPSY Left 6/26/2024    Procedure: Biopsy-bone marrow;  Surgeon: Bridger Zapata MD;  Location: [facility] ENDO (4TH FLR);  Service: Oncology;  Laterality: Left;  6/24-pt knows to hold aspirin and eliquis as instructed by hem/onc, precall complete-Kpvt    CARDIOVERSION  6/11/2024    Procedure: Cardioversion;  Surgeon: Bronson Bowden MD;  Location: [facility] EP LAB;  Service: Cardiology;;    CATARACT EXTRACTION W/  INTRAOCULAR LENS IMPLANT Right 3/13/2024    Procedure: EXTRACTION, CATARACT, WITH IOL INSERTION;  Surgeon: Jennifer Palacio MD;  Location: [facility] OR;  Service: Ophthalmology;  Laterality: Right;    CATARACT EXTRACTION W/  INTRAOCULAR LENS IMPLANT Left 4/10/2024    Procedure: EXTRACTION, CATARACT, WITH IOL INSERTION;  Surgeon: Jennifer Palacio MD;  Location: [facility] OR;  Service: Ophthalmology;  Laterality: Left;    COLONOSCOPY N/A 12/21/2020    Procedure: COLONOSCOPY;  Surgeon: Tico Bell MD;  Location: [facility] ENDO (4TH FLR);  Service: Endoscopy;  Laterality: N/A;  ok to hold eliquis per Dr. Valadez, see telephone encounter 11/13/2020-MS  covid test 12/18 [place]    ECHOCARDIOGRAM,TRANSESOPHAGEAL N/A 2/3/2025    Procedure: Transesophageal echo (FELICIANO) intra-procedure log documentation;  Surgeon: Grayson Lin III, MD;  Location: [facility] EP LAB;  Service: Cardiology;  Laterality: N/A;    KIDNEY TRANSPLANT      TRANSESOPHAGEAL ECHOCARDIOGRAPHY N/A 8/26/2019    Procedure: ECHOCARDIOGRAM, TRANSESOPHAGEAL;  Surgeon: [facility];  Location: [facility] EP LAB;  Service: Cardiology;  Laterality: N/A;    TRANSESOPHAGEAL ECHOCARDIOGRAPHY N/A 6/11/2024    Procedure: ECHOCARDIOGRAM, TRANSESOPHAGEAL;  Surgeon: Grayson Lin III, MD;  Location: [facility] EP LAB;  Service: Cardiology;  Laterality: N/A;    TRANSESOPHAGEAL ECHOCARDIOGRAPHY N/A 6/12/2025    Procedure: ECHOCARDIOGRAM, TRANSESOPHAGEAL;  Surgeon: Breezy Nguyen MD;   Location: [facility] EP LAB;  Service: Cardiology;  Laterality: N/A;    TREATMENT OF CARDIAC ARRHYTHMIA N/A 2019    Procedure: CARDIOVERSION;  Surgeon: Bronson Bowden MD;  Location: [facility] EP LAB;  Service: Cardiology;  Laterality: N/A;  AF, FELICIANO, DCCV, MAC, GP, 3 PREP    TREATMENT OF CARDIAC ARRHYTHMIA N/A 2/3/2025    Procedure: Cardioversion or Defibrillation;  Surgeon: Bronson Bowden MD;  Location: [facility] EP LAB;  Service: Cardiology;  Laterality: N/A;  AF, FELICIANO, DCCV, MAC, GP, 3 Prep       Review of patient's allergies indicates:   Allergen Reactions    Nifedipine Other (See Comments)     Gingival hyperplasia       Family History       Problem Relation (Age of Onset)    Cancer Brother    Diabetes Mother    Heart disease Mother, Father    Hypertension Mother, Brother          Tobacco Use    Smoking status: Former     Current packs/day: 0.00     Average packs/day: 0.5 packs/day for 32.0 years (16.0 ttl pk-yrs)     Types: Cigarettes     Start date: 1984     Quit date: 2016     Years since quittin.6    Smokeless tobacco: Never   Vaping Use    Vaping status: Never Used   Substance and Sexual Activity    Alcohol use: Not Currently    Drug use: No    Sexual activity: Yes     Partners: Female         Review of Systems   Constitutional:  Negative for chills, diaphoresis, fever (Febrile on admission, but did not feel feverish) and unexpected weight change.   HENT:  Negative for congestion, sore throat and trouble swallowing.    Eyes:  Negative for visual disturbance.   Respiratory:  Negative for cough.    Cardiovascular:  Negative for chest pain.   Gastrointestinal:  Negative for nausea and vomiting.   Endocrine: Negative for polydipsia and polyuria.   Musculoskeletal:  Negative for back pain.   Skin:  Negative for rash.   Neurological:  Positive for dizziness.     Objective:     Vital Signs (Most Recent):  Temp: 97.2 °F (36.2 °C) (25 1639)  Pulse: 73 (25 1639)  Resp: 18 (25 163)  BP: (!)  170/70 (07/14/25 1639)  SpO2: 100 % (07/14/25 1639) Vital Signs (24h Range):  Temp:  [97.2 °F (36.2 °C)-98.9 °F (37.2 °C)] 97.2 °F (36.2 °C)  Pulse:  [44-81] 73  Resp:  [10-29] 18  SpO2:  [95 %-100 %] 100 %  BP: (145-214)/() 170/70     Weight: 87.7 kg (193 lb 5.5 oz)  Body mass index is 25.51 kg/m².      Intake/Output Summary (Last 24 hours) at 7/14/2025 1646  Last data filed at 7/14/2025 1346  Gross per 24 hour   Intake 1061.67 ml   Output 1700 ml   Net -638.33 ml        Physical Exam  Vitals and nursing note reviewed.   Constitutional:       General: He is not in acute distress.     Appearance: He is not toxic-appearing.   HENT:      Head: Normocephalic and atraumatic.   Eyes:      Extraocular Movements: Extraocular movements intact.      Conjunctiva/sclera: Conjunctivae normal.      Pupils: Pupils are equal, round, and reactive to light.   Cardiovascular:      Rate and Rhythm: Normal rate.      Heart sounds: Normal heart sounds. No murmur heard.     No gallop.   Pulmonary:      Effort: Pulmonary effort is normal.      Breath sounds: Normal breath sounds.   Abdominal:      General: Abdomen is flat.      Palpations: Abdomen is soft.   Musculoskeletal:         General: No swelling.      Cervical back: Normal range of motion.      Right lower leg: No edema.      Left lower leg: No edema.   Skin:     General: Skin is warm and dry.   Neurological:      General: No focal deficit present.      Mental Status: He is alert and oriented to person, place, and time. Mental status is at baseline.   Psychiatric:         Mood and Affect: Mood normal.         Behavior: Behavior normal.          Vents:       Lines/Drains/Airways       Peripheral Intravenous Line  Duration             Peripheral IV 07/12/25 2057 20 G Right Antecubital 1 day    Peripheral IV Single Lumen 07/13/25 1436 22 G Anterior;Distal;Left Upper Arm 1 day                    Significant Labs:    CBC/Anemia Profile:  Recent Labs   Lab 07/12/25 2021  "07/13/25 0311 07/14/25  0739   WBC 7.54 6.36 5.82   HGB 8.9* 8.5* 9.0*   HCT 30.2* 30.4* 30.4*   * 118* 134*   MCV 74* 77* 72*   RDW 19.3* 19.6* 19.1*        Chemistries:  Recent Labs   Lab 07/12/25 2021 07/13/25 0311 07/13/25 2133 07/14/25  0739    135*  --  137   K 4.3 3.3* 4.1 3.4*    101  --  101   CO2 22* 22*  --  24   BUN 68* 66*  --  61*   CREATININE 3.9* 3.9*  --  3.5*   CALCIUM 8.7 8.2*  --  8.5*   ALBUMIN 3.1*  --   --   --    PROT 7.0  --   --   --    BILITOT 0.5  --   --   --    ALKPHOS 54  --   --   --    ALT <5*  --   --   --    AST 23  --   --   --    MG 2.2 1.9 2.2 1.9   PHOS 3.3 3.1 3.8 3.4       ABGs: No results for input(s): "PH", "PCO2", "HCO3", "POCSATURATED", "BE" in the last 48 hours.  Blood Culture: No results for input(s): "LABBLOO" in the last 48 hours.  BMP:   Recent Labs   Lab 07/14/25  0739   *      K 3.4*      CO2 24   BUN 61*   CREATININE 3.5*   CALCIUM 8.5*   MG 1.9     Lactic Acid:   Recent Labs   Lab 07/12/25 2021   LACTATE 1.9     Respiratory Culture: No results for input(s): "GSRESP", "RESPIRATORYC" in the last 48 hours.  Urine Culture: No results for input(s): "LABURIN" in the last 48 hours.  All pertinent labs within the past 24 hours have been reviewed.    Significant Imaging:   I have reviewed all pertinent imaging results/findings within the past 24 hours.  "

## 2025-07-14 NOTE — PLAN OF CARE
[facility] Care Support Interaction      I have reviewed the chart of Ravi Zamorano who is hospitalized for Acute on chronic diastolic congestive heart failure. The patient is currently located in the following unit: [facility]       I have seen and examined the patient and provided the following support:     Readmission Reduction - Acute Care at Home Referral and Digital Medicine Referral       Jaz Rodriguez PA-C  [facility]

## 2025-07-14 NOTE — ASSESSMENT & PLAN NOTE
Suspect etiology cardiorenal.    - Cr 3.9.  Baseline Cr 3.1  - UA unremarkable   - renal transplant US pending   - nephrology consulted, appreciate recommendations   - hold ACE-I or ARB while renal function dynamic  - Monitor UOP and follow serial BMP   - Adjust drugs to GFR/CrCL; avoid nephrotoxic drugs   -  Estimated Creatinine Clearance: 23.1 mL/min (A) (based on SCr of 3.5 mg/dL (H)).   - Monitor electrolytes, phos levels daily  -improving with diuresis and BP control

## 2025-07-14 NOTE — HPI
This is a 68 yo male with a PMH of s/p kidney transplant in 2016 (on tacrolimus), CKD 4, HFpEF, T2DM on insulin pump, atrial fibrillation (on xarelto), and an AICD who presented to the ED with a concern for dizziness. He states he has been feeling dizzy for the past several days before admission. The most recent occurrence was when he was brushing his teeth and then taking a shower, he felt himself slowly leaning forward on both instances. He denies any loss of consciousness. He states he did bump his head in the shower but from a slow lean forward. He denies any shortness of breath or any other symptoms at this time.     In the ED, the pt was hypertensive 230/98, febrile with a temp of 101.4 F. Was found to have PHUONG on CKD with a creatinine of 3.9, baseline 3.1). BNP was elevated at 4,888. The pt denied feeling fever or chills, recent sick contacts, exposure to animals at home or mold. In addition pt denies having any fevers at home, trouble swallowing, or choking on food or liquids.     Pulmonology was consulted for recurrent aspiration pneumonia.

## 2025-07-14 NOTE — PROGRESS NOTES
[facility] Acute Medical Stepdown  Nephrology  Progress Note    Patient Name: Ravi Zamorano  MRN: [id number]  Admission Date: 7/12/2025  Hospital Length of Stay: 1 days  Attending Provider: Celine Rodriguez MD   Primary Care Physician: Mima Mack MD  Principal Problem:Acute on chronic diastolic congestive heart failure    Subjective:     Interval hx: BP elevated overnight, otherwise no events.       Objective:     Vital Signs (Most Recent):  Temp: 98 °F (36.7 °C) (07/14/25 0308)  Pulse: 74 (07/14/25 0707)  Resp: 16 (07/14/25 0707)  BP: (!) 205/89 (post hydralazine check hour x1) (07/14/25 0707)  SpO2: 100 % (07/14/25 0707) Vital Signs (24h Range):  Temp:  [97.6 °F (36.4 °C)-98.5 °F (36.9 °C)] 98 °F (36.7 °C)  Pulse:  [44-88] 74  Resp:  [10-96] 16  SpO2:  [90 %-100 %] 100 %  BP: (145-229)/() 205/89     Weight: 87.7 kg (193 lb 5.5 oz)  Body mass index is 25.51 kg/m².     Physical Exam  Constitutional:       General: He is not in acute distress.     Appearance: He is well-developed.   HENT:      Head: Normocephalic.   Cardiovascular:      Rate and Rhythm: Normal rate and regular rhythm.      Heart sounds: Normal heart sounds.   Pulmonary:      Effort: Pulmonary effort is normal. No respiratory distress.   Musculoskeletal:         General: Swelling present. Normal range of motion.   Skin:     General: Skin is warm.   Neurological:      Mental Status: He is alert.        Significant Labs: All pertinent labs within the past 24 hours have been reviewed.  CBC:   Recent Labs   Lab 07/12/25 2021 07/13/25 0311   WBC 7.54 6.36   HGB 8.9* 8.5*   HCT 30.2* 30.4*   * 118*     CMP:   Recent Labs   Lab 07/12/25 2021 07/13/25 0311 07/13/25 2133    135*  --    K 4.3 3.3* 4.1    101  --    CO2 22* 22*  --    * 152*  --    BUN 68* 66*  --    CREATININE 3.9* 3.9*  --    CALCIUM 8.7 8.2*  --    PROT 7.0  --   --    ALBUMIN 3.1*  --   --    BILITOT 0.5  --   --    ALKPHOS 54  --   --     AST 23  --   --    ALT <5*  --   --    ANIONGAP 14 12  --      Cardiac Markers:   Recent Labs   Lab 07/12/25 2021   BNP 4,888*     Troponin:   Recent Labs   Lab 07/12/25 2021 07/13/25  0058 07/13/25  0311   TROPONINIHS 205* 224* 220*     TSH:   Recent Labs   Lab 07/12/25 2021   TSH 1.684     Urine Studies:   Recent Labs   Lab 07/12/25  2351   COLORU Colorless*   APPEARANCEUA Clear   PHUR 8.0   SPECGRAV 1.010   PROTEINUA 2+*   GLUCUA 3+*   BILIRUBINUA Negative   OCCULTUA Negative   NITRITE Negative   UROBILINOGEN Negative   LEUKOCYTESUR Negative   RBCUA 1   WBCUA <1   BACTERIA Rare   HYALINECASTS 0       Assessment/Plan:   PHUONG on CKD4 s/p kidney transplant 2016 BL Cr 2.6-3.1  CAP  HFpEF  IDDM2  Afib long term AC xarelto + AICD  Long term drug induced immunosuppression  L renal mass    -PHUONG 2/2 CRS vs HTN crisis. BL Cr 2.6-3.1. He is receiving diuretics 60 mg iv lasix bid and /1350. Cr improved today to 3.5.   -daily prograf level. Tac 1 mg given last night 510 pm, awaiting level today. On tac 1/1, prednisone 5 mg qd, 500 mg bid MMF at home. Currently holding MMF d/t fever. On abx for CAP.  -L renal mass noted on native kidney, suspicious for RCC. Will need eval from urology.   -awaiting CMV level (thrombocytopenia and fever and IC state)  -rec eval from pulm for repeat admission for pna    Thank you for your consult. I will follow-up with patient. Please contact us if you have any additional questions.    Norberto Rand MD  Nephrology  [facility] - Acute Medical Stepdown

## 2025-07-14 NOTE — ASSESSMENT & PLAN NOTE
This is a 70 yo male with a PMH of kidney transplant in 2016, CKD4, HFpEF, T2DM, Afib admitted with acute on chronic diastolic heart failure with diastolic dysfunction and diagnosis of pneumonia.     Pt presented to the ED with a fever of 101.4 F   CT Abdomen/Chest w/o contrast revealed:   -Multifocal ground-glass and nodular airspace opacities, with more confluent appearance in the posterior right upper lobe.  Findings are concerning for multifocal pneumonia versus aspiration.  Interval improvement in left lung consolidations.  Stable bilateral small pleural effusions.   -Concerns of recurrent aspiration pneumonia from previous imaging from previous hospitalizations are more likely pleural effusion. Imaging from this current stay more consistent with pneumonia. Pt was started on antibiotic therapy of rocephin and flagyl, had improvement of symptoms and no recurrent fever.    Plan:   - Defer antibiotic management to Infectious Disease, they recommend change ceftriaxone/flagy to augmentin on discharge, complete total 7d course for atypical pneumonia treatment.  - Concerns of aspiration pneumonia. Bed side swallow was performed.   -Oral and pharyngeal swallowing function appears to be WFL. Previous bedside swallowing assessments and FEES have also revealed no concerns for quan-pharyngeal dysphagia   - Recommend Esophagram to assess swallowing function.

## 2025-07-15 ENCOUNTER — DOCUMENTATION ONLY (OUTPATIENT)
Dept: CARDIOLOGY | Facility: CLINIC | Age: 70
End: 2025-07-15
Payer: MEDICARE

## 2025-07-15 PROBLEM — I73.9 PVD (PERIPHERAL VASCULAR DISEASE): Status: ACTIVE | Noted: 2025-07-15

## 2025-07-15 LAB
ABSOLUTE EOSINOPHIL (OHS): 0.32 K/UL
ABSOLUTE MONOCYTE (OHS): 1.15 K/UL (ref 0.3–1)
ABSOLUTE NEUTROPHIL COUNT (OHS): 3.25 K/UL (ref 1.8–7.7)
ANION GAP (OHS): 11 MMOL/L (ref 8–16)
BASOPHILS # BLD AUTO: 0.03 K/UL
BASOPHILS NFR BLD AUTO: 0.5 %
BUN SERPL-MCNC: 63 MG/DL (ref 8–23)
CALCIUM SERPL-MCNC: 8.4 MG/DL (ref 8.7–10.5)
CHLORIDE SERPL-SCNC: 103 MMOL/L (ref 95–110)
CO2 SERPL-SCNC: 23 MMOL/L (ref 23–29)
CREAT SERPL-MCNC: 3.5 MG/DL (ref 0.5–1.4)
ERYTHROCYTE [DISTWIDTH] IN BLOOD BY AUTOMATED COUNT: 19.2 % (ref 11.5–14.5)
GFR SERPLBLD CREATININE-BSD FMLA CKD-EPI: 18 ML/MIN/1.73/M2
GLUCOSE SERPL-MCNC: 156 MG/DL (ref 70–110)
HCT VFR BLD AUTO: 29.8 % (ref 40–54)
HGB BLD-MCNC: 9.2 GM/DL (ref 14–18)
IMM GRANULOCYTES # BLD AUTO: 0.03 K/UL (ref 0–0.04)
IMM GRANULOCYTES NFR BLD AUTO: 0.5 % (ref 0–0.5)
LYMPHOCYTES # BLD AUTO: 1.36 K/UL (ref 1–4.8)
MAGNESIUM SERPL-MCNC: 2 MG/DL (ref 1.6–2.6)
MCH RBC QN AUTO: 22 PG (ref 27–31)
MCHC RBC AUTO-ENTMCNC: 30.9 G/DL (ref 32–36)
MCV RBC AUTO: 71 FL (ref 82–98)
NUCLEATED RBC (/100WBC) (OHS): 0 /100 WBC
PHOSPHATE SERPL-MCNC: 3.4 MG/DL (ref 2.7–4.5)
PLATELET # BLD AUTO: 131 K/UL (ref 150–450)
PMV BLD AUTO: ABNORMAL FL
POCT GLUCOSE: 151 MG/DL (ref 70–110)
POCT GLUCOSE: 173 MG/DL (ref 70–110)
POCT GLUCOSE: 181 MG/DL (ref 70–110)
POCT GLUCOSE: 197 MG/DL (ref 70–110)
POCT GLUCOSE: 283 MG/DL (ref 70–110)
POTASSIUM SERPL-SCNC: 4.1 MMOL/L (ref 3.5–5.1)
RBC # BLD AUTO: 4.18 M/UL (ref 4.6–6.2)
RELATIVE EOSINOPHIL (OHS): 5.2 %
RELATIVE LYMPHOCYTE (OHS): 22.1 % (ref 18–48)
RELATIVE MONOCYTE (OHS): 18.7 % (ref 4–15)
RELATIVE NEUTROPHIL (OHS): 53 % (ref 38–73)
SODIUM SERPL-SCNC: 137 MMOL/L (ref 136–145)
TACROLIMUS BLD-MCNC: 7.2 NG/ML (ref 5–15)
WBC # BLD AUTO: 6.14 K/UL (ref 3.9–12.7)

## 2025-07-15 PROCEDURE — 99232 SBSQ HOSP IP/OBS MODERATE 35: CPT | Mod: HCNC,,,

## 2025-07-15 PROCEDURE — 25000003 PHARM REV CODE 250: Mod: HCNC

## 2025-07-15 PROCEDURE — 85025 COMPLETE CBC W/AUTO DIFF WBC: CPT | Mod: HCNC

## 2025-07-15 PROCEDURE — 80048 BASIC METABOLIC PNL TOTAL CA: CPT | Mod: HCNC

## 2025-07-15 PROCEDURE — 63600175 PHARM REV CODE 636 W HCPCS: Mod: HCNC

## 2025-07-15 PROCEDURE — 83735 ASSAY OF MAGNESIUM: CPT | Mod: HCNC

## 2025-07-15 PROCEDURE — 11000001 HC ACUTE MED/SURG PRIVATE ROOM: Mod: HCNC

## 2025-07-15 PROCEDURE — 25000003 PHARM REV CODE 250: Mod: HCNC | Performed by: STUDENT IN AN ORGANIZED HEALTH CARE EDUCATION/TRAINING PROGRAM

## 2025-07-15 PROCEDURE — 36415 COLL VENOUS BLD VENIPUNCTURE: CPT | Mod: HCNC

## 2025-07-15 PROCEDURE — 99232 SBSQ HOSP IP/OBS MODERATE 35: CPT | Mod: HCNC,GC,, | Performed by: INTERNAL MEDICINE

## 2025-07-15 PROCEDURE — 80197 ASSAY OF TACROLIMUS: CPT | Mod: HCNC

## 2025-07-15 PROCEDURE — 93922 UPR/L XTREMITY ART 2 LEVELS: CPT | Mod: HCNC | Performed by: SURGERY

## 2025-07-15 PROCEDURE — 84100 ASSAY OF PHOSPHORUS: CPT | Mod: HCNC

## 2025-07-15 RX ORDER — GLUCAGON 1 MG
1 KIT INJECTION
Status: DISCONTINUED | OUTPATIENT
Start: 2025-07-15 | End: 2025-07-15

## 2025-07-15 RX ORDER — ISOSORBIDE MONONITRATE 30 MG/1
120 TABLET, EXTENDED RELEASE ORAL DAILY
Status: DISCONTINUED | OUTPATIENT
Start: 2025-07-16 | End: 2025-07-17

## 2025-07-15 RX ORDER — INSULIN ASPART 100 [IU]/ML
0-10 INJECTION, SOLUTION INTRAVENOUS; SUBCUTANEOUS
Status: DISCONTINUED | OUTPATIENT
Start: 2025-07-15 | End: 2025-07-18 | Stop reason: HOSPADM

## 2025-07-15 RX ORDER — INSULIN ASPART 100 [IU]/ML
0-5 INJECTION, SOLUTION INTRAVENOUS; SUBCUTANEOUS
Status: DISCONTINUED | OUTPATIENT
Start: 2025-07-15 | End: 2025-07-15

## 2025-07-15 RX ORDER — IBUPROFEN 200 MG
16 TABLET ORAL
Status: DISCONTINUED | OUTPATIENT
Start: 2025-07-15 | End: 2025-07-18 | Stop reason: HOSPADM

## 2025-07-15 RX ORDER — INSULIN ASPART 100 [IU]/ML
0.7 INJECTION, SOLUTION INTRAVENOUS; SUBCUTANEOUS CONTINUOUS
Status: DISCONTINUED | OUTPATIENT
Start: 2025-07-15 | End: 2025-07-18 | Stop reason: HOSPADM

## 2025-07-15 RX ORDER — HYDRALAZINE HYDROCHLORIDE 50 MG/1
100 TABLET, FILM COATED ORAL EVERY 8 HOURS
Status: DISCONTINUED | OUTPATIENT
Start: 2025-07-15 | End: 2025-07-18 | Stop reason: HOSPADM

## 2025-07-15 RX ORDER — ISOSORBIDE MONONITRATE 30 MG/1
60 TABLET, EXTENDED RELEASE ORAL DAILY
Status: DISCONTINUED | OUTPATIENT
Start: 2025-07-15 | End: 2025-07-15

## 2025-07-15 RX ORDER — INSULIN ASPART 100 [IU]/ML
2 INJECTION, SOLUTION INTRAVENOUS; SUBCUTANEOUS
Status: DISCONTINUED | OUTPATIENT
Start: 2025-07-15 | End: 2025-07-15

## 2025-07-15 RX ORDER — IBUPROFEN 200 MG
16 TABLET ORAL
Status: DISCONTINUED | OUTPATIENT
Start: 2025-07-15 | End: 2025-07-15

## 2025-07-15 RX ORDER — IBUPROFEN 200 MG
24 TABLET ORAL
Status: DISCONTINUED | OUTPATIENT
Start: 2025-07-15 | End: 2025-07-15

## 2025-07-15 RX ORDER — INSULIN ASPART 100 [IU]/ML
5 INJECTION, SOLUTION INTRAVENOUS; SUBCUTANEOUS
Status: DISCONTINUED | OUTPATIENT
Start: 2025-07-15 | End: 2025-07-15

## 2025-07-15 RX ORDER — IBUPROFEN 200 MG
24 TABLET ORAL
Status: DISCONTINUED | OUTPATIENT
Start: 2025-07-15 | End: 2025-07-18 | Stop reason: HOSPADM

## 2025-07-15 RX ORDER — GLUCAGON 1 MG
1 KIT INJECTION
Status: DISCONTINUED | OUTPATIENT
Start: 2025-07-15 | End: 2025-07-18 | Stop reason: HOSPADM

## 2025-07-15 RX ADMIN — HYDRALAZINE HYDROCHLORIDE 100 MG: 50 TABLET ORAL at 04:07

## 2025-07-15 RX ADMIN — TACROLIMUS 1 MG: 1 CAPSULE ORAL at 09:07

## 2025-07-15 RX ADMIN — INSULIN ASPART 5 UNITS: 100 INJECTION, SOLUTION INTRAVENOUS; SUBCUTANEOUS at 09:07

## 2025-07-15 RX ADMIN — INSULIN ASPART 1 UNITS: 100 INJECTION, SOLUTION INTRAVENOUS; SUBCUTANEOUS at 01:07

## 2025-07-15 RX ADMIN — ISOSORBIDE MONONITRATE 60 MG: 30 TABLET, EXTENDED RELEASE ORAL at 06:07

## 2025-07-15 RX ADMIN — INSULIN ASPART 0.7 UNITS/HR: 100 INJECTION, SOLUTION INTRAVENOUS; SUBCUTANEOUS at 04:07

## 2025-07-15 RX ADMIN — HYDRALAZINE HYDROCHLORIDE 100 MG: 50 TABLET ORAL at 09:07

## 2025-07-15 RX ADMIN — FUROSEMIDE 60 MG: 40 TABLET ORAL at 06:07

## 2025-07-15 RX ADMIN — RIVAROXABAN 15 MG: 15 TABLET, FILM COATED ORAL at 06:07

## 2025-07-15 RX ADMIN — INSULIN ASPART 1 UNITS: 100 INJECTION, SOLUTION INTRAVENOUS; SUBCUTANEOUS at 09:07

## 2025-07-15 RX ADMIN — ATORVASTATIN CALCIUM 80 MG: 40 TABLET, FILM COATED ORAL at 09:07

## 2025-07-15 RX ADMIN — AMOXICILLIN AND CLAVULANATE POTASSIUM 500 MG: 500; 125 TABLET, FILM COATED ORAL at 09:07

## 2025-07-15 RX ADMIN — AMOXICILLIN AND CLAVULANATE POTASSIUM 500 MG: 500; 125 TABLET, FILM COATED ORAL at 08:07

## 2025-07-15 RX ADMIN — FUROSEMIDE 60 MG: 40 TABLET ORAL at 09:07

## 2025-07-15 RX ADMIN — FERROUS SULFATE TAB EC 325 MG (65 MG FE EQUIVALENT) 1 EACH: 325 (65 FE) TABLET DELAYED RESPONSE at 09:07

## 2025-07-15 RX ADMIN — TACROLIMUS 1 MG: 1 CAPSULE ORAL at 06:07

## 2025-07-15 RX ADMIN — ASPIRIN 81 MG CHEWABLE TABLET 81 MG: 81 TABLET CHEWABLE at 09:07

## 2025-07-15 RX ADMIN — INSULIN ASPART 1 UNITS: 100 INJECTION, SOLUTION INTRAVENOUS; SUBCUTANEOUS at 02:07

## 2025-07-15 RX ADMIN — HYDRALAZINE HYDROCHLORIDE 100 MG: 50 TABLET ORAL at 01:07

## 2025-07-15 RX ADMIN — DOXYCYCLINE HYCLATE 100 MG: 100 TABLET, COATED ORAL at 08:07

## 2025-07-15 RX ADMIN — INSULIN ASPART 5 UNITS: 100 INJECTION, SOLUTION INTRAVENOUS; SUBCUTANEOUS at 01:07

## 2025-07-15 RX ADMIN — PREDNISONE 5 MG: 5 TABLET ORAL at 09:07

## 2025-07-15 RX ADMIN — DOXAZOSIN 16 MG: 8 TABLET ORAL at 09:07

## 2025-07-15 RX ADMIN — DOXYCYCLINE HYCLATE 100 MG: 100 TABLET, COATED ORAL at 09:07

## 2025-07-15 NOTE — ASSESSMENT & PLAN NOTE
-noted on CT A/P and on dedicated Retroperitoneal US  -discussed with Urology. Will arrange outpatient Uro Onc clinic follow up

## 2025-07-15 NOTE — ASSESSMENT & PLAN NOTE
Patient has paroxysmal (<7 days) atrial fibrillation. Patient is currently in atrial fibrillation. [id number] Score: 3. The patients heart rate in the last 24 hours is as follows:  Pulse  Min: 60  Max: 87  Antiarrhythmics     Anticoagulants  rivaroxaban tablet 15 mg, With dinner, Oral    Plan  - Replete lytes with a goal of K>4, Mg >2  - Patient is anticoagulated, see medications listed above.  - Patient's afib is currently controlled  - afib recurrence after recent ablation, consider EP consult if symptomatic

## 2025-07-15 NOTE — ASSESSMENT & PLAN NOTE
Suspect etiology cardiorenal.    - Cr 3.9.  Baseline Cr 3.1   -CR close to baseline   - UA unremarkable   - renal transplant US ordered    - nephrology consulted, appreciate recommendations   - hold ACE-I or ARB while renal function dynamic  - Monitor UOP and follow serial BMP   - Adjust drugs to GFR/CrCL; avoid nephrotoxic drugs   -  Estimated Creatinine Clearance: 23.1 mL/min (A) (based on SCr of 3.5 mg/dL (H)).   - Monitor electrolytes, phos levels daily  -improving with diuresis and BP control

## 2025-07-15 NOTE — SUBJECTIVE & OBJECTIVE
Interval History: BP elevated overnight. Wife at bedside. Discussed esophagram and patient is agreeable to this. CR stable.     Review of Systems  Objective:     Vital Signs (Most Recent):  Temp: 98.2 °F (36.8 °C) (07/15/25 1127)  Pulse: 82 (07/15/25 1127)  Resp: 20 (07/15/25 1127)  BP: (!) 186/83 (07/15/25 1127)  SpO2: 95 % (07/15/25 1127) Vital Signs (24h Range):  Temp:  [97.2 °F (36.2 °C)-98.5 °F (36.9 °C)] 98.2 °F (36.8 °C)  Pulse:  [60-87] 82  Resp:  [10-23] 20  SpO2:  [95 %-100 %] 95 %  BP: (116-211)/(61-96) 186/83     Weight: 87.7 kg (193 lb 5.5 oz)  Body mass index is 25.51 kg/m².    Intake/Output Summary (Last 24 hours) at 7/15/2025 1328  Last data filed at 7/15/2025 1156  Gross per 24 hour   Intake 1591.67 ml   Output 1775 ml   Net -183.33 ml         Physical Exam  Constitutional:       Appearance: Normal appearance.   HENT:      Head: Normocephalic and atraumatic.      Nose: Nose normal.      Mouth/Throat:      Mouth: Mucous membranes are moist.   Eyes:      Extraocular Movements: Extraocular movements intact.      Pupils: Pupils are equal, round, and reactive to light.   Cardiovascular:      Rate and Rhythm: Normal rate and regular rhythm.      Heart sounds: No murmur heard.     No gallop.   Pulmonary:      Effort: Pulmonary effort is normal.      Breath sounds: Normal breath sounds. No wheezing or rales.   Abdominal:      General: Abdomen is flat. Bowel sounds are normal. There is no distension.      Palpations: Abdomen is soft.      Tenderness: There is no abdominal tenderness.   Musculoskeletal:         General: No swelling or tenderness. Normal range of motion.      Cervical back: Normal range of motion and neck supple.      Right lower leg: Edema present.      Left lower leg: Edema present.      Comments: Edema improving   Skin:     General: Skin is warm and dry.      Capillary Refill: Capillary refill takes less than 2 seconds.   Neurological:      General: No focal deficit present.      Mental  Status: He is alert. Mental status is at baseline.   Psychiatric:         Mood and Affect: Mood normal.               Significant Labs: All pertinent labs within the past 24 hours have been reviewed.    Significant Imaging: I have reviewed all pertinent imaging results/findings within the past 24 hours.

## 2025-07-15 NOTE — ASSESSMENT & PLAN NOTE
- Patient's FSGs are controlled on current hypoglycemics.   - Last A1c reviewed- 6.5  Maintain anti-hyperglycemic dose as follows-   Antihyperglycemics (From admission, onward)      Start     Stop Route Frequency Ordered    07/15/25 1315  insulin aspart U-100 pen 5 Units         -- SubQ 3 times daily with meals 07/15/25 1303    07/15/25 1130  insulin regular in 0.9 % NaCl 100 unit/100 mL (1 unit/mL) infusion        Question:  Enter initial dose (Units/hr):  Answer:  0.4    -- IV Continuous 07/15/25 1022    07/15/25 1122  insulin aspart U-100 pen 0-5 Units         -- SubQ Before meals, nightly and at 0200 PRN 07/15/25 1022          - endocrine consulted for insulin pump management on continuous insulin gtt until he can get omnipod

## 2025-07-15 NOTE — PROGRESS NOTES
"[facility]  Endocrinology  Progress Note    Admit Date: 2025     Reason for Consult: Management of T2DM, Hyperglycemia      Diabetes diagnosis year:       Home Diabetes Medications:    OMNIPOD 5  0.6 u/hr mn-0600 , 9p-mn, 0.7 u/hr 6a-9p   Target 120-130 mg/dl   Iob 3 hours  Max bolus 20 units   Max basal 2 u/hr   Isf 40 mn-mn  ICR 1 to 7.5      Presets:  Snack: 4 units (28g)  Small Meal: 8 units (56g)  Medium Meal: 12 units (90g)  Large Meal: 15 units (114g)  Super Large Meal: 18 units (130g)     How often checking glucose at home? >4 x day   BG readings on regimen: 150s  Hypoglycemia on the regimen?  No  Missed doses on regimen?  No     Diabetes Complications include:     Hyperglycemia     Complicating diabetes co morbidities:   S/p Kidney tx          HPI:Ravi Zamorano is a 69 y.o. male s/p kidney transplant in 2016 on tacro, CKD 4, HFpEF, T2DM with insulin pump use, afib on xarelto and AICD being admitted to hospital medicine for manangement of PHUONG on CKD and CHF exacerbation. Patient reports increased feelings of lightheadedness/dizziness with leaning forward the past several days. Endorses associated unsteady gait and bilateral "hand twitching". In the ED hypertensive to 230/98, febrile to Tmax 101.4 F. Endocrine consulted to manage hyperglycemia, type 2 diabetes, and insulin pump therapy  in the context of the inpatient setting.     Lab Results   Component Value Date    HGBA1C 6.5 (H) 2025           Interval HPI:   No acute events overnight. Patient in room 94696/25063 A. Blood glucose stable. BG at goal on current insulin regimen (Transition Insulin Drip). Steroid use- Prednisone 5 mg daily.    Renal function- Abnormal    Lab Results   Component Value Date    CREATININE 3.5 (H) 07/15/2025     Vasopressors-  None      Endocrine will continue to follow and manage insulin orders inpatient.      Diet Low Sodium (2 gm) Fluid - 1500mL      Eatin%  Nausea: " "No  Hypoglycemia and intervention: No  Fever: No  TPN and/or TF: No  If yes, type of TF/TPN and rate: N/A    BP (!) 211/96 (BP Location: Right arm, Patient Position: Lying)   Pulse 74   Temp 98.5 °F (36.9 °C) (Oral)   Resp (!) 23   Ht 6' 1" (1.854 m)   Wt 87.7 kg (193 lb 5.5 oz)   SpO2 98%   BMI 25.51 kg/m²     Labs Reviewed and Include    Recent Labs   Lab 07/15/25  0449   *   CALCIUM 8.4*      K 4.1   CO2 23      BUN 63*   CREATININE 3.5*     Lab Results   Component Value Date    WBC 6.14 07/15/2025    HGB 9.2 (L) 07/15/2025    HCT 29.8 (L) 07/15/2025    MCV 71 (L) 07/15/2025     (L) 07/15/2025     Recent Labs   Lab 07/12/25 2021   TSH 1.684     Lab Results   Component Value Date    HGBA1C 6.5 (H) 07/13/2025       Nutritional status:   Body mass index is 25.51 kg/m².  Lab Results   Component Value Date    ALBUMIN 3.1 (L) 07/12/2025    ALBUMIN 3.4 (L) 07/07/2025    ALBUMIN 3.1 (L) 06/27/2025     No results found for: "PREALBUMIN"    Estimated Creatinine Clearance: 23.1 mL/min (A) (based on SCr of 3.5 mg/dL (H)).    Accu-Checks  Recent Labs     07/13/25  2125 07/14/25  0251 07/14/25  0807 07/14/25  1113 07/14/25  1331 07/14/25  1638 07/14/25  1745 07/14/25  2010 07/15/25  0202 07/15/25  0731   POCTGLUCOSE 279* 183* 156* 217* 209* 296* 282* 282* 197* 151*       Current Medications and/or Treatments Impacting Glycemic Control  Immunotherapy:    Immunosuppressants           Stop Route Frequency     tacrolimus capsule 1 mg         -- Oral 2 times daily          Steroids:   Hormones (From admission, onward)      Start     Stop Route Frequency Ordered    07/13/25 0900  predniSONE tablet 5 mg         -- Oral Daily 07/13/25 0543    07/13/25 0138  melatonin tablet 6 mg         -- Oral Nightly PRN 07/13/25 0040          Pressors:    Autonomic Drugs (From admission, onward)      None          Hyperglycemia/Diabetes Medications:   Antihyperglycemics (From admission, onward)      Start     Stop " Route Frequency Ordered    07/15/25 1130  insulin regular in 0.9 % NaCl 100 unit/100 mL (1 unit/mL) infusion        Question:  Enter initial dose (Units/hr):  Answer:  0.4    -- IV Continuous 07/15/25 1022    07/15/25 1130  insulin aspart U-100 pen 2 Units         -- SubQ 3 times daily with meals 07/15/25 1022    07/15/25 1122  insulin aspart U-100 pen 0-5 Units         -- SubQ Before meals, nightly and at 0200 PRN 07/15/25 1022            ASSESSMENT and PLAN    Cardiac/Vascular  * Acute on chronic diastolic congestive heart failure  Managed per primary team  Avoid hypoglycemia        Endocrine  Insulin pump in place  Not a candidate for pump therapy at this time.   Patient does not have supplies.       Type 2 diabetes mellitus with stage 4 chronic kidney disease, with long-term current use of insulin  Endocrinology consulted for BG management.   BG goal 140-180     - Not a candidate for insulin pump therapy at this time.   - Patient does not have his insulin pump supplies. However, his wife will arrive tonight. Instructed patient that his wife needs to bring his Omnipod kit, insulin vials, and Dexcom sensors.   - Will keep patient on his current regimen below until he has his pump and CGM supplies.    - Transition drip at 0.4 units/hr with step-down parameters   - Novolog (aspart) insulin 5 Units SQ TIDWM and prn for BG excursions LDC SSI (150/50).  - BG checks AC/HS/0200  - Hypoglycemia protocol in place    ** Please notify Endocrine for any change and/or advance in diet**  ** Please call Endocrine for any BG related issues **    Discharge Planning:   TBD. Please notify endocrinology prior to discharge.            Malina Slaughter PA-C  Endocrinology  [facility]

## 2025-07-15 NOTE — PLAN OF CARE
POC updated and reviewed with the patient and spouse at bedside. Questions regarding POC were encouraged and addressed. VSS with mild improvement in bradycardia and hypertension from last night, see flowsheets. Tele maintained per provider's order. Patient is AO x 4 at this time. No acute events noted during shift. Pain/Nausea management using PRN medications. See MAR for medication administration details. Fall, and safety precautions maintained. No signs of injury noted. Upon exiting room, patient's bed locked in low position, side rails up x 2, bed alarm on, with call light within reach. Instructed patient to call staff for mobility, verbalized understanding.  No acute signs of distress noted at this time. Pt is scheduled for esophogram tomorrow AM, will be NPO at midnight. Pt transitioned off insulin gtt to home insulin pump, log at bedside and signed agreement in chart.     Problem: Adult Inpatient Plan of Care  Goal: Plan of Care Review  Outcome: Progressing  Goal: Patient-Specific Goal (Individualized)  Outcome: Progressing  Goal: Absence of Hospital-Acquired Illness or Injury  Outcome: Progressing  Goal: Optimal Comfort and Wellbeing  Outcome: Progressing  Goal: Readiness for Transition of Care  Outcome: Progressing     Problem: Infection  Goal: Absence of Infection Signs and Symptoms  Outcome: Progressing     Problem: Diabetes Comorbidity  Goal: Blood Glucose Level Within Targeted Range  Outcome: Progressing     Problem: Acute Kidney Injury/Impairment  Goal: Fluid and Electrolyte Balance  Outcome: Progressing  Goal: Improved Oral Intake  Outcome: Progressing  Goal: Effective Renal Function  Outcome: Progressing     Problem: Pneumonia  Goal: Fluid Balance  Outcome: Progressing  Goal: Resolution of Infection Signs and Symptoms  Outcome: Progressing  Goal: Effective Oxygenation and Ventilation  Outcome: Progressing     Problem: Fall Injury Risk  Goal: Absence of Fall and Fall-Related Injury  Outcome:  Progressing     Problem: Dysrhythmia  Goal: Normalized Cardiac Rhythm  Outcome: Progressing     Problem: Heart Failure  Goal: Optimal Coping  Outcome: Progressing  Goal: Optimal Cardiac Output  Outcome: Progressing  Goal: Stable Heart Rate and Rhythm  Outcome: Progressing  Goal: Optimal Functional Ability  Outcome: Progressing  Goal: Fluid and Electrolyte Balance  Outcome: Progressing  Goal: Improved Oral Intake  Outcome: Progressing  Goal: Effective Oxygenation and Ventilation  Outcome: Progressing  Goal: Effective Breathing Pattern During Sleep  Outcome: Progressing

## 2025-07-15 NOTE — ASSESSMENT & PLAN NOTE
Anemia is likely due to chronic disease due to Chronic Kidney Disease. Most recent hemoglobin and hematocrit are listed below.  Recent Labs     07/13/25  0311 07/14/25  0739 07/15/25  0449   HGB 8.5* 9.0* 9.2*   HCT 30.4* 30.4* 29.8*     Plan  - Monitor serial CBC: Daily  - Transfuse PRBC if patient becomes hemodynamically unstable, symptomatic or H/H drops below 7/21.  - Patient has not received any PRBC transfusions to date  - Patient's anemia is currently stable

## 2025-07-15 NOTE — ASSESSMENT & PLAN NOTE
Patient has paroxysmal (<7 days) atrial fibrillation. Patient is currently in atrial fibrillation. [id number] Score: 3. The patients heart rate in the last 24 hours is as follows:  Pulse  Min: 60  Max: 87  Antiarrhythmics     Anticoagulants  rivaroxaban tablet 15 mg, With dinner, Oral    Plan  - Replete lytes with a goal of K>4, Mg >2  - Patient is anticoagulated, see medications listed above.  - Patient's afib is currently controlled  - afib recurrence after recent ablation, consider EP consult if symptomatic

## 2025-07-15 NOTE — PLAN OF CARE
Pt aaox4. Able to make needs known. Denies any pain or discomfort at present moment. >95% on RA with no distress noted. Tele-afib. BP elevated 208/95. MD notified and rescheduled hydralazine. Insulin gtt on going at 0.4u/hr. CBG monitoring in place. Insulin administered per ss. PO ABT remain progress. NO s/s of any adverse reactions noted. Afebrile. Pt stable with no known needs voiced at this time. Wife remains at bedside providing comfort.         Problem: Adult Inpatient Plan of Care  Goal: Plan of Care Review  Outcome: Progressing  Goal: Patient-Specific Goal (Individualized)  Outcome: Progressing  Goal: Absence of Hospital-Acquired Illness or Injury  Outcome: Progressing  Goal: Optimal Comfort and Wellbeing  Outcome: Progressing  Goal: Readiness for Transition of Care  Outcome: Progressing     Problem: Infection  Goal: Absence of Infection Signs and Symptoms  Outcome: Progressing     Problem: Diabetes Comorbidity  Goal: Blood Glucose Level Within Targeted Range  Outcome: Progressing     Problem: Acute Kidney Injury/Impairment  Goal: Fluid and Electrolyte Balance  Outcome: Progressing  Goal: Improved Oral Intake  Outcome: Progressing  Goal: Effective Renal Function  Outcome: Progressing     Problem: Pneumonia  Goal: Fluid Balance  Outcome: Progressing  Goal: Resolution of Infection Signs and Symptoms  Outcome: Progressing  Goal: Effective Oxygenation and Ventilation  Outcome: Progressing     Problem: Fall Injury Risk  Goal: Absence of Fall and Fall-Related Injury  Outcome: Progressing     Problem: Dysrhythmia  Goal: Normalized Cardiac Rhythm  Outcome: Progressing     Problem: Heart Failure  Goal: Optimal Coping  Outcome: Progressing  Goal: Optimal Cardiac Output  Outcome: Progressing  Goal: Stable Heart Rate and Rhythm  Outcome: Progressing  Goal: Optimal Functional Ability  Outcome: Progressing  Goal: Fluid and Electrolyte Balance  Outcome: Progressing  Goal: Improved Oral Intake  Outcome: Progressing  Goal:  Effective Oxygenation and Ventilation  Outcome: Progressing  Goal: Effective Breathing Pattern During Sleep  Outcome: Progressing

## 2025-07-15 NOTE — ASSESSMENT & PLAN NOTE
Fever   Unclear etiology, patient without symptoms to guide possible cause  - febrile to 101.4F on admission  - CBC without leukocytosis  - CT head without acute process  -CT C/A/P concerning for asp vs multifocal PNA and renal mass    -possible component of malignancy causing fevers   -will continue Doxycycline and Augmentin   -no further episodes of fever   -Pulm consulted for recurrent asp PNA. Esophagram ordered.   -Cleared by ST without swallowing issues

## 2025-07-15 NOTE — ASSESSMENT & PLAN NOTE
Patient's most recent potassium results are listed below.   Recent Labs     07/13/25  2133 07/14/25  0739 07/15/25  0449   K 4.1 3.4* 4.1     Plan  - Replete potassium per protocol  - Monitor potassium Daily  - Patient's hypokalemia is stable

## 2025-07-15 NOTE — SUBJECTIVE & OBJECTIVE
"Interval HPI:   No acute events overnight. Patient in room 60627/84244 A. Blood glucose stable. BG at goal on current insulin regimen (Transition Insulin Drip). Steroid use- Prednisone 5 mg daily.    Renal function- Abnormal    Lab Results   Component Value Date    CREATININE 3.5 (H) 07/15/2025     Vasopressors-  None      Endocrine will continue to follow and manage insulin orders inpatient.      Diet Low Sodium (2 gm) Fluid - 1500mL      Eatin%  Nausea: No  Hypoglycemia and intervention: No  Fever: No  TPN and/or TF: No  If yes, type of TF/TPN and rate: N/A    BP (!) 211/96 (BP Location: Right arm, Patient Position: Lying)   Pulse 74   Temp 98.5 °F (36.9 °C) (Oral)   Resp (!) 23   Ht 6' 1" (1.854 m)   Wt 87.7 kg (193 lb 5.5 oz)   SpO2 98%   BMI 25.51 kg/m²     Labs Reviewed and Include    Recent Labs   Lab 07/15/25  0449   *   CALCIUM 8.4*      K 4.1   CO2 23      BUN 63*   CREATININE 3.5*     Lab Results   Component Value Date    WBC 6.14 07/15/2025    HGB 9.2 (L) 07/15/2025    HCT 29.8 (L) 07/15/2025    MCV 71 (L) 07/15/2025     (L) 07/15/2025     Recent Labs   Lab 25   TSH 1.684     Lab Results   Component Value Date    HGBA1C 6.5 (H) 2025       Nutritional status:   Body mass index is 25.51 kg/m².  Lab Results   Component Value Date    ALBUMIN 3.1 (L) 2025    ALBUMIN 3.4 (L) 2025    ALBUMIN 3.1 (L) 2025     No results found for: "PREALBUMIN"    Estimated Creatinine Clearance: 23.1 mL/min (A) (based on SCr of 3.5 mg/dL (H)).    Accu-Checks  Recent Labs     25  2125 25  0251 25  0807 25  1113 25  1331 25  1638 25  1745 07/14/25  2010 07/15/25  0202 07/15/25  0731   POCTGLUCOSE 279* 183* 156* 217* 209* 296* 282* 282* 197* 151*       Current Medications and/or Treatments Impacting Glycemic Control  Immunotherapy:    Immunosuppressants           Stop Route Frequency     tacrolimus capsule 1 mg      "    -- Oral 2 times daily          Steroids:   Hormones (From admission, onward)      Start     Stop Route Frequency Ordered    07/13/25 0900  predniSONE tablet 5 mg         -- Oral Daily 07/13/25 0543    07/13/25 0138  melatonin tablet 6 mg         -- Oral Nightly PRN 07/13/25 0040          Pressors:    Autonomic Drugs (From admission, onward)      None          Hyperglycemia/Diabetes Medications:   Antihyperglycemics (From admission, onward)      Start     Stop Route Frequency Ordered    07/15/25 1130  insulin regular in 0.9 % NaCl 100 unit/100 mL (1 unit/mL) infusion        Question:  Enter initial dose (Units/hr):  Answer:  0.4    -- IV Continuous 07/15/25 1022    07/15/25 1130  insulin aspart U-100 pen 2 Units         -- SubQ 3 times daily with meals 07/15/25 1022    07/15/25 1122  insulin aspart U-100 pen 0-5 Units         -- SubQ Before meals, nightly and at 0200 PRN 07/15/25 1022

## 2025-07-15 NOTE — PROGRESS NOTES
"[facility] - Acute Medical [facility] Medicine  Progress Note    Patient Name: Ravi Zamorano  MRN: [id number]  Patient Class: IP- Inpatient   Admission Date: 7/12/2025  Length of Stay: 2 days  Attending Physician: Celine Rodriguez MD  Primary Care Provider: Mima Mack MD        Subjective     Principal Problem:Acute on chronic diastolic congestive heart failure        HPI:  Ravi Zamorano is a 69 y.o. male s/p kidney transplant in 2016 on tacro, CKD 4, HFpEF, T2DM with insulin pump use, afib on xarelto and AICD being admitted to hospital medicine for manangement of PHUONG on CKD and CHF exacerbation.  Patient reports increased feelings of lightheadedness/dizziness with leaning forward the past several days. Endorses associated unsteady gait and bilateral "hand twitching". During the encounter he seems to have a difficult time explaining his symptoms or their duration. Denies any recent illness, cough, congestion, N/V/D, abdominal pain, dysuria or hematuria. Febrile to 101.4 F on admission, when asked if having recent fevers he says he often has fevers to 101 F at home but is unable to tell me the most recent time this has happened. Sates he does not know what his current home medication regimen is.       In ED: hypertensive to 230/98, febrile to Tmax 101.4 F, remaining vitals stable. CBC without leukocytosis, hgb 8.5 which is baseline. CMP notable for PHUONG on CKD (creatinine 3.9, most recent baseline ~3.1). BNP elevated to 4,888 (elevated from most recent 3,164 6 days ago). Lactic acid and procal wnl. UA non infectious. CT head without acute intracranial abnormality. CXR with cardiomegaly with interstitial edema. Given 1 0 mg IV hydralazine x1, 20 mg IV hydralazine x1 and 80 mg IV lasix in the ED. Admitted to hospital medicine for further management.     Overview/Hospital Course:  While on the floor patient remained hypertensive. KTM and ID consulted as well as Nephrology. Continued on IV " diuresis for concerns of Cardiorenal with improvement in Cr. On IV antibiotics per ID recs with Cefepime and Flagyl. CT C/A/P notable for aspiration PNA as well as concerns for renal mass. Dedicated Renal US ordered and notable for new renal lesion on the left kidney concerning for renal cell carcinoma. Urology consulted. KTM recommending Pulm consult for recurrent PNA.            Interval History: BP elevated overnight. Wife at bedside. Discussed esophagram and patient is agreeable to this. CR stable.     Review of Systems  Objective:     Vital Signs (Most Recent):  Temp: 98.2 °F (36.8 °C) (07/15/25 1127)  Pulse: 82 (07/15/25 1127)  Resp: 20 (07/15/25 1127)  BP: (!) 186/83 (07/15/25 1127)  SpO2: 95 % (07/15/25 1127) Vital Signs (24h Range):  Temp:  [97.2 °F (36.2 °C)-98.5 °F (36.9 °C)] 98.2 °F (36.8 °C)  Pulse:  [60-87] 82  Resp:  [10-23] 20  SpO2:  [95 %-100 %] 95 %  BP: (116-211)/(61-96) 186/83     Weight: 87.7 kg (193 lb 5.5 oz)  Body mass index is 25.51 kg/m².    Intake/Output Summary (Last 24 hours) at 7/15/2025 1328  Last data filed at 7/15/2025 1156  Gross per 24 hour   Intake 1591.67 ml   Output 1775 ml   Net -183.33 ml         Physical Exam  Constitutional:       Appearance: Normal appearance.   HENT:      Head: Normocephalic and atraumatic.      Nose: Nose normal.      Mouth/Throat:      Mouth: Mucous membranes are moist.   Eyes:      Extraocular Movements: Extraocular movements intact.      Pupils: Pupils are equal, round, and reactive to light.   Cardiovascular:      Rate and Rhythm: Normal rate and regular rhythm.      Heart sounds: No murmur heard.     No gallop.   Pulmonary:      Effort: Pulmonary effort is normal.      Breath sounds: Normal breath sounds. No wheezing or rales.   Abdominal:      General: Abdomen is flat. Bowel sounds are normal. There is no distension.      Palpations: Abdomen is soft.      Tenderness: There is no abdominal tenderness.   Musculoskeletal:         General: No swelling  or tenderness. Normal range of motion.      Cervical back: Normal range of motion and neck supple.      Right lower leg: Edema present.      Left lower leg: Edema present.      Comments: Edema improving   Skin:     General: Skin is warm and dry.      Capillary Refill: Capillary refill takes less than 2 seconds.   Neurological:      General: No focal deficit present.      Mental Status: He is alert. Mental status is at baseline.   Psychiatric:         Mood and Affect: Mood normal.               Significant Labs: All pertinent labs within the past 24 hours have been reviewed.    Significant Imaging: I have reviewed all pertinent imaging results/findings within the past 24 hours.      Assessment & Plan  Acute on chronic diastolic congestive heart failure   - CHF pathway initiated   - CXR revealed Cardiomegaly with interstitial edema,   - BNP 4,888 / troponin 205>>220  - EKG with atrial fibrillation recurrence (recent ablation 04/25)  - last echo showing ejection fraction of 50 - 55%.   - repeat TTE ordered   -  de escalate to PO lasix 60 mg BID   - strict Is/Os   - cardiac, low sodium diet  - fluid restrict 1.5L while inpatient  - daily CMP   - monitor on cardiac telemetry   - supplemental O2 as needed   Community acquired bacterial pneumonia  Fever   Unclear etiology, patient without symptoms to guide possible cause  - febrile to 101.4F on admission  - CBC without leukocytosis  - CT head without acute process  -CT C/A/P concerning for asp vs multifocal PNA and renal mass    -possible component of malignancy causing fevers   -will continue Doxycycline and Augmentin   -no further episodes of fever   -Pulm consulted for recurrent asp PNA. Esophagram ordered.   -Cleared by ST without swallowing issues      Hypertensive emergency  Patient has a current diagnosis of Hypertensive emergency with end organ damage evidenced by acute kidney injury which is controlled.  Latest blood pressure and vitals reviewed-   Temp:  [97.2 °F  (36.2 °C)-98.5 °F (36.9 °C)]   Pulse:  [60-87]   Resp:  [10-23]   BP: (116-211)/(61-96)   SpO2:  [95 %-100 %] .   Patient currently off IV antihypertensives.   Home meds for hypertension were reviewed and noted below.   -unfortunately holding ARB due to PHUONG and BB due to bradycardia   -cannot tolerate CCB   -will increase doxazosin and start hydralazine 100 mg TID and Imdur 60, will increase     Renal mass  -noted on CT A/P and on dedicated Retroperitoneal US  -discussed with Urology. Will arrange outpatient Uro Onc clinic follow up     Acute kidney injury superimposed on stage 4 chronic kidney disease  CKD IV (severe)  Suspect etiology cardiorenal.    - Cr 3.9.  Baseline Cr 3.1   -CR close to baseline   - UA unremarkable   - renal transplant US ordered    - nephrology consulted, appreciate recommendations   - hold ACE-I or ARB while renal function dynamic  - Monitor UOP and follow serial BMP   - Adjust drugs to GFR/CrCL; avoid nephrotoxic drugs   -  Estimated Creatinine Clearance: 23.1 mL/min (A) (based on SCr of 3.5 mg/dL (H)).   - Monitor electrolytes, phos levels daily  -improving with diuresis and BP control   Type 2 diabetes mellitus with stage 4 chronic kidney disease, with long-term current use of insulin  Insulin pump in place  - Patient's FSGs are controlled on current hypoglycemics.   - Last A1c reviewed- 6.5  Maintain anti-hyperglycemic dose as follows-   Antihyperglycemics (From admission, onward)      Start     Stop Route Frequency Ordered    07/15/25 1315  insulin aspart U-100 pen 5 Units         -- SubQ 3 times daily with meals 07/15/25 1303    07/15/25 1130  insulin regular in 0.9 % NaCl 100 unit/100 mL (1 unit/mL) infusion        Question:  Enter initial dose (Units/hr):  Answer:  0.4    -- IV Continuous 07/15/25 1022    07/15/25 1122  insulin aspart U-100 pen 0-5 Units         -- SubQ Before meals, nightly and at 0200 PRN 07/15/25 1022          - endocrine consulted for insulin pump management on  continuous insulin gtt until he can get omnipod     Hyperlipidemia associated with type 2 diabetes mellitus  - continue statin  S/P cadaveric kidney transplant 11/5/2016. ESRD secondary to HTN/DMII  Immunocompromised state due to drug therapy  Long-term use of immunosuppressant medication  - continue home tacro 1 mg BID  - KTM consulted  - renal transplant US ordered  - daily BMP, daily tacro levels   Paroxysmal atrial fibrillation  Long term (current) use of anticoagulants  Patient has paroxysmal (<7 days) atrial fibrillation. Patient is currently in atrial fibrillation. [id number] Score: 3. The patients heart rate in the last 24 hours is as follows:  Pulse  Min: 60  Max: 87  Antiarrhythmics     Anticoagulants  rivaroxaban tablet 15 mg, With dinner, Oral    Plan  - Replete lytes with a goal of K>4, Mg >2  - Patient is anticoagulated, see medications listed above.  - Patient's afib is currently controlled  - afib recurrence after recent ablation, consider EP consult if symptomatic   Thrombocytopenia, unspecified  The patients 3 most recent labs are listed below.  Recent Labs     07/13/25  0311 07/14/25  0739 07/15/25  0449   * 134* 131*     Plan  - Will transfuse if platelet count is <10k.  - daily CBC   Anemia of chronic disease  Anemia is likely due to chronic disease due to Chronic Kidney Disease. Most recent hemoglobin and hematocrit are listed below.  Recent Labs     07/13/25  0311 07/14/25  0739 07/15/25  0449   HGB 8.5* 9.0* 9.2*   HCT 30.4* 30.4* 29.8*     Plan  - Monitor serial CBC: Daily  - Transfuse PRBC if patient becomes hemodynamically unstable, symptomatic or H/H drops below 7/21.  - Patient has not received any PRBC transfusions to date  - Patient's anemia is currently stable  History of CVA (cerebrovascular accident)  - noted  - continue home statin and asa   Hypokalemia  Patient's most recent potassium results are listed below.   Recent Labs     07/13/25  2133 07/14/25  0739 07/15/25  0449    K 4.1 3.4* 4.1     Plan  - Replete potassium per protocol  - Monitor potassium Daily  - Patient's hypokalemia is stable  Hypertension associated with stage 4 chronic kidney disease due to type 2 diabetes mellitus  Uncontrolled on admission  - as above   - vitals q4h   PVD (peripheral vascular disease)  -ABIs ordered  -pulses on doppler found. No acute concerns     VTE Risk Mitigation (From admission, onward)           Ordered     rivaroxaban tablet 15 mg  With dinner         07/13/25 0543     Reason for No Pharmacological VTE Prophylaxis  Once        Question:  Reasons:  Answer:  Already adequately anticoagulated on oral Anticoagulants    07/13/25 0040     IP VTE HIGH RISK PATIENT  Once         07/13/25 0040     Place sequential compression device  Until discontinued         07/13/25 0040                    Discharge Planning   UBALDO: 7/16/2025     Code Status: Full Code   Medical Readiness for Discharge Date:                            Celine Rodriguez MD  Department of Hospital Medicine   [facility] - Acute Medical Stepdown

## 2025-07-15 NOTE — PROGRESS NOTES
PT went to Hospital on 7-12 with c/o Dizziness and was Obs status till 7-13. Episode closed, new Episode opened 2/2 new referral.

## 2025-07-15 NOTE — ASSESSMENT & PLAN NOTE
The patients 3 most recent labs are listed below.  Recent Labs     07/13/25  0311 07/14/25  0739 07/15/25  0449   * 134* 131*     Plan  - Will transfuse if platelet count is <10k.  - daily CBC

## 2025-07-15 NOTE — SUBJECTIVE & OBJECTIVE
"  Objective:     Vital Signs (Most Recent):  Temp: 97.8 °F (36.6 °C) (07/15/25 0433)  Pulse: 70 (07/15/25 0638)  Resp: 18 (07/15/25 0638)  BP: (!) 194/90 (07/15/25 0638)  SpO2: 97 % (07/15/25 0433) Vital Signs (24h Range):  Temp:  [97.2 °F (36.2 °C)-98.9 °F (37.2 °C)] 97.8 °F (36.6 °C)  Pulse:  [60-77] 70  Resp:  [10-20] 18  SpO2:  [97 %-100 %] 97 %  BP: (116-208)/(61-95) 194/90     Weight: 87.7 kg (193 lb 5.5 oz)  Body mass index is 25.51 kg/m².     Physical Exam  Constitutional:       General: He is not in acute distress.     Appearance: He is well-developed.   HENT:      Head: Normocephalic.   Cardiovascular:      Rate and Rhythm: Normal rate and regular rhythm.      Heart sounds: Normal heart sounds.   Pulmonary:      Effort: Pulmonary effort is normal. No respiratory distress.   Musculoskeletal:         General: Swelling present. Normal range of motion.   Skin:     General: Skin is warm.   Neurological:      Mental Status: He is alert.        Significant Labs: All pertinent labs within the past 24 hours have been reviewed.  CBC:   Recent Labs   Lab 07/14/25  0739 07/15/25  0449   WBC 5.82 6.14   HGB 9.0* 9.2*   HCT 30.4* 29.8*   * 131*     CMP:   Recent Labs   Lab 07/13/25  2133 07/14/25  0739   NA  --  137   K 4.1 3.4*   CL  --  101   CO2  --  24   GLU  --  133*   BUN  --  61*   CREATININE  --  3.5*   CALCIUM  --  8.5*   ANIONGAP  --  12     Cardiac Markers:   No results for input(s): "CKMB", "MYOGLOBIN", "BNP", "TROPISTAT" in the last 48 hours.    Troponin:   No results for input(s): "TROPONINI", "TROPONINIHS" in the last 48 hours.    TSH:   Recent Labs   Lab 07/12/25 2021   TSH 1.684     Urine Studies:   No results for input(s): "COLORU", "APPEARANCEUA", "PHUR", "SPECGRAV", "PROTEINUA", "GLUCUA", "KETONESU", "BILIRUBINUA", "OCCULTUA", "NITRITE", "UROBILINOGEN", "LEUKOCYTESUR", "RBCUA", "WBCUA", "BACTERIA", "SQUAMEPITHEL", "HYALINECASTS" in the last 48 hours.    Invalid input(s): "WRIGHTSUR"      "

## 2025-07-15 NOTE — ASSESSMENT & PLAN NOTE
Patient has a current diagnosis of Hypertensive emergency with end organ damage evidenced by acute kidney injury which is controlled.  Latest blood pressure and vitals reviewed-   Temp:  [97.2 °F (36.2 °C)-98.5 °F (36.9 °C)]   Pulse:  [60-87]   Resp:  [10-23]   BP: (116-211)/(61-96)   SpO2:  [95 %-100 %] .   Patient currently off IV antihypertensives.   Home meds for hypertension were reviewed and noted below.   -unfortunately holding ARB due to PHUONG and BB due to bradycardia   -cannot tolerate CCB   -will increase doxazosin and start hydralazine 100 mg TID and Imdur 60, will increase

## 2025-07-15 NOTE — NURSING
Home insulin pump use patient agreement signed and placed in chart. Home insulin pump log sheet at bedside, RN educated wife and pt on how to keep track. Pt home supply of insulin labeled and placed in refrigerator in med room 3. Return home insulin at discharge, pt and wife aware of this and aware of responsibility to remember to ask for insulin at time of discharge. Understanding of all RN instruction and education vocalized by pt and wife.

## 2025-07-15 NOTE — PROGRESS NOTES
"[facility] - Acute Medical Stepdown  [facility]  Progress Note    Patient Name: Ravi Zamorano  MRN: [id number]  Admission Date: 7/12/2025  Hospital Length of Stay: 2 days  Attending Provider: Celine Rodriguez MD   Primary Care Physician: Mima Mack MD  Principal Problem:Acute on chronic diastolic congestive heart failure    Subjective:     Interval hx: No overnight events. Afebrile. On RA.       Objective:     Vital Signs (Most Recent):  Temp: 97.8 °F (36.6 °C) (07/15/25 0433)  Pulse: 70 (07/15/25 0638)  Resp: 18 (07/15/25 0638)  BP: (!) 194/90 (07/15/25 0638)  SpO2: 97 % (07/15/25 0433) Vital Signs (24h Range):  Temp:  [97.2 °F (36.2 °C)-98.9 °F (37.2 °C)] 97.8 °F (36.6 °C)  Pulse:  [60-77] 70  Resp:  [10-20] 18  SpO2:  [97 %-100 %] 97 %  BP: (116-208)/(61-95) 194/90     Weight: 87.7 kg (193 lb 5.5 oz)  Body mass index is 25.51 kg/m².     Physical Exam  Constitutional:       General: He is not in acute distress.     Appearance: He is well-developed.   HENT:      Head: Normocephalic.   Cardiovascular:      Rate and Rhythm: Normal rate and regular rhythm.      Heart sounds: Normal heart sounds.   Pulmonary:      Effort: Pulmonary effort is normal. No respiratory distress.   Musculoskeletal:         General: Swelling present. Normal range of motion.   Skin:     General: Skin is warm.   Neurological:      Mental Status: He is alert.        Significant Labs: All pertinent labs within the past 24 hours have been reviewed.  CBC:   Recent Labs   Lab 07/14/25 0739 07/15/25  0449   WBC 5.82 6.14   HGB 9.0* 9.2*   HCT 30.4* 29.8*   * 131*     CMP:   Recent Labs   Lab 07/13/25  2133 07/14/25  0739   NA  --  137   K 4.1 3.4*   CL  --  101   CO2  --  24   GLU  --  133*   BUN  --  61*   CREATININE  --  3.5*   CALCIUM  --  8.5*   ANIONGAP  --  12     Cardiac Markers:   No results for input(s): "CKMB", "MYOGLOBIN", "BNP", "TROPISTAT" in the last 48 hours.    Troponin:   No results for input(s): "TROPONINI", " ""TROPONINIHS" in the last 48 hours.    TSH:   Recent Labs   Lab 07/12/25 2021   TSH 1.684     Urine Studies:   No results for input(s): "COLORU", "APPEARANCEUA", "PHUR", "SPECGRAV", "PROTEINUA", "GLUCUA", "KETONESU", "BILIRUBINUA", "OCCULTUA", "NITRITE", "UROBILINOGEN", "LEUKOCYTESUR", "RBCUA", "WBCUA", "BACTERIA", "SQUAMEPITHEL", "HYALINECASTS" in the last 48 hours.    Invalid input(s): "WRIGHTSUR"      Assessment/Plan:   PHUONG on CKD4 s/p kidney transplant 2016 BL Cr 2.6-3.1  CAP  HFpEF  IDDM2  Afib long term AC xarelto + AICD  Long term drug induced immunosuppression  L renal mass     -PHUONG 2/2 CRS vs HTN crisis. BL Cr 2.6-3.1. Cont diuretics 60 mg iv lasix bid. /925. Cr today stable at 3.5.   -daily prograf level. Tac 1 mg given last night 523 pm, awaiting level today. On tac 1/1, prednisone 5 mg qd, 500 mg bid MMF at home. Currently holding MMF d/t PNA. On abx for CAP.   -L renal mass noted on native kidney, suspicious for RCC. Will need eval from urology and follow up imaging.   -CMV DNA Qual undetected  -per ID plan is rocephin/flagyl to augmentin for PNA on dc  -per pulm concern for asp pna, and they rec esophagram to eval for dysmotility    Thank you for your consult. I will follow-up with patient. Please contact us if you have any additional questions.    Norberto Rand MD  Nephrology  [facility] - Acute Medical Stepdown  "

## 2025-07-15 NOTE — PLAN OF CARE
[facility] - Acute Medical Stepdown  Initial Discharge Assessment       Primary Care Provider: Mima Mack MD    Admission Diagnosis: Dizziness [R42]  CHF (congestive heart failure) [I50.9]  A-fib [I48.91]  Insulin pump in place [Z96.41]  Acute on chronic diastolic congestive heart failure [I50.33]  Chest pain [R07.9]  Hyperlipidemia associated with type 2 diabetes mellitus [E11.69, E78.5]  Type 2 diabetes mellitus with stage 4 chronic kidney disease, with long-term current use of insulin [E11.22, N18.4, Z79.4]    Admission Date: 7/12/2025  Expected Discharge Date: 7/16/2025    Transition of Care Barriers: (P) None    Payor: [employer] MANAGED MEDICARE / Plan: [employer] MEDICARE HMO / Product Type: Capitation /     Extended Emergency Contact Information  Primary Emergency Contact: diegoRicha  Mobile Phone: [phone]  Relation: Sister  Secondary Emergency Contact: Erika Zamorano  Address: [address] [place] [address] [facility]  Home Phone: [phone]  Mobile Phone: [phone]  Relation: Spouse    Discharge Plan A: (P) Home with family  Discharge Plan B: (P) Home Health    Patient is independent at baseline and denies discharge needs.      [facility]  [address]  [facility] [address]  Phone: [phone] Fax: [phone]    [id number] [address] AT [address] [address]  Phone: [phone] Fax: [phone]      Initial Assessment (most recent)       Adult Discharge Assessment - 07/15/25 1329          Discharge Assessment    Assessment Type Discharge Planning Assessment     Confirmed/corrected address, phone number and insurance Yes     Confirmed Demographics Correct on Facesheet     Source of Information patient     Communicated UBALDO with patient/caregiver Yes     People in Home spouse     Facility Arrived From: Home     Do you expect to  return to your current living situation? Yes     Do you have help at home or someone to help you manage your care at home? No     Prior to hospitilization cognitive status: Alert/Oriented     Current cognitive status: Alert/Oriented     Walking or Climbing Stairs Difficulty no     Dressing/Bathing Difficulty no     Home Accessibility wheelchair accessible     Home Layout Able to live on 1st floor     Equipment Currently Used at Home cane, straight     Readmission within 30 days? No     Patient currently being followed by outpatient case management? No     Do you currently have service(s) that help you manage your care at home? No     Do you take prescription medications? Yes     Do you have prescription coverage? Yes     Do you have any problems affording any of your prescribed medications? No     Is the patient taking medications as prescribed? yes     How do you get to doctors appointments? car, drives self     Are you on dialysis? No     Do you take coumadin? No     Discharge Plan A Home with family (P)      Discharge Plan B Home Health (P)      DME Needed Upon Discharge  none (P)      Discharge Plan discussed with: Patient (P)      Transition of Care Barriers None (P)         Financial Resource Strain    How hard is it for you to pay for the very basics like food, housing, medical care, and heating? Not very hard (P)         Housing Stability    In the last 12 months, was there a time when you were not able to pay the mortgage or rent on time? No (P)      At any time in the past 12 months, were you homeless or living in a shelter (including now)? No (P)         Transportation Needs    In the past 12 months, has lack of transportation kept you from medical appointments or from getting medications? No (P)      In the past 12 months, has lack of transportation kept you from meetings, work, or from getting things needed for daily living? No (P)         Food Insecurity    Within the past 12 months, you worried that  your food would run out before you got the money to buy more. Never true (P)      Within the past 12 months, the food you bought just didn't last and you didn't have money to get more. Never true (P)         Stress    Do you feel stress - tense, restless, nervous, or anxious, or unable to sleep at night because your mind is troubled all the time - these days? To some extent (P)         Social Isolation    How often do you feel lonely or isolated from those around you?  Rarely (P)         Health Literacy    How often do you need to have someone help you when you read instructions, pamphlets, or other written material from your doctor or pharmacy? Sometimes (P)                    Devonte Roche RN, BSN    [phone]

## 2025-07-15 NOTE — ASSESSMENT & PLAN NOTE
- CHF pathway initiated   - CXR revealed Cardiomegaly with interstitial edema,   - BNP 4,888 / troponin 205>>220  - EKG with atrial fibrillation recurrence (recent ablation 04/25)  - last echo showing ejection fraction of 50 - 55%.   - repeat TTE ordered   -  de escalate to PO lasix 60 mg BID   - strict Is/Os   - cardiac, low sodium diet  - fluid restrict 1.5L while inpatient  - daily CMP   - monitor on cardiac telemetry   - supplemental O2 as needed

## 2025-07-16 LAB
ABSOLUTE EOSINOPHIL (OHS): 0.29 K/UL
ABSOLUTE MONOCYTE (OHS): 1.08 K/UL (ref 0.3–1)
ABSOLUTE NEUTROPHIL COUNT (OHS): 3.34 K/UL (ref 1.8–7.7)
ANION GAP (OHS): 14 MMOL/L (ref 8–16)
BASOPHILS # BLD AUTO: 0.04 K/UL
BASOPHILS NFR BLD AUTO: 0.7 %
BUN SERPL-MCNC: 64 MG/DL (ref 8–23)
CALCIUM SERPL-MCNC: 8.5 MG/DL (ref 8.7–10.5)
CHLORIDE SERPL-SCNC: 103 MMOL/L (ref 95–110)
CO2 SERPL-SCNC: 20 MMOL/L (ref 23–29)
CREAT SERPL-MCNC: 3.4 MG/DL (ref 0.5–1.4)
ERYTHROCYTE [DISTWIDTH] IN BLOOD BY AUTOMATED COUNT: 18.8 % (ref 11.5–14.5)
GFR SERPLBLD CREATININE-BSD FMLA CKD-EPI: 19 ML/MIN/1.73/M2
GLUCOSE SERPL-MCNC: 134 MG/DL (ref 70–110)
HCT VFR BLD AUTO: 29.1 % (ref 40–54)
HGB BLD-MCNC: 9 GM/DL (ref 14–18)
IMM GRANULOCYTES # BLD AUTO: 0.03 K/UL (ref 0–0.04)
IMM GRANULOCYTES NFR BLD AUTO: 0.5 % (ref 0–0.5)
LYMPHOCYTES # BLD AUTO: 1.29 K/UL (ref 1–4.8)
MAGNESIUM SERPL-MCNC: 1.9 MG/DL (ref 1.6–2.6)
MCH RBC QN AUTO: 21.6 PG (ref 27–31)
MCHC RBC AUTO-ENTMCNC: 30.9 G/DL (ref 32–36)
MCV RBC AUTO: 70 FL (ref 82–98)
NUCLEATED RBC (/100WBC) (OHS): 0 /100 WBC
OHS QRS DURATION: 114 MS
OHS QTC CALCULATION: 416 MS
PHOSPHATE SERPL-MCNC: 3.1 MG/DL (ref 2.7–4.5)
PLATELET # BLD AUTO: 152 K/UL (ref 150–450)
PMV BLD AUTO: ABNORMAL FL
POCT GLUCOSE: 142 MG/DL (ref 70–110)
POCT GLUCOSE: 145 MG/DL (ref 70–110)
POCT GLUCOSE: 186 MG/DL (ref 70–110)
POCT GLUCOSE: 249 MG/DL (ref 70–110)
POCT GLUCOSE: 314 MG/DL (ref 70–110)
POTASSIUM SERPL-SCNC: 3.4 MMOL/L (ref 3.5–5.1)
RBC # BLD AUTO: 4.17 M/UL (ref 4.6–6.2)
RELATIVE EOSINOPHIL (OHS): 4.8 %
RELATIVE LYMPHOCYTE (OHS): 21.3 % (ref 18–48)
RELATIVE MONOCYTE (OHS): 17.8 % (ref 4–15)
RELATIVE NEUTROPHIL (OHS): 54.9 % (ref 38–73)
SODIUM SERPL-SCNC: 137 MMOL/L (ref 136–145)
TACROLIMUS BLD-MCNC: 8.8 NG/ML (ref 5–15)
WBC # BLD AUTO: 6.07 K/UL (ref 3.9–12.7)

## 2025-07-16 PROCEDURE — 94761 N-INVAS EAR/PLS OXIMETRY MLT: CPT | Mod: HCNC

## 2025-07-16 PROCEDURE — 99232 SBSQ HOSP IP/OBS MODERATE 35: CPT | Mod: HCNC,GC,, | Performed by: INTERNAL MEDICINE

## 2025-07-16 PROCEDURE — 25000003 PHARM REV CODE 250: Mod: HCNC | Performed by: STUDENT IN AN ORGANIZED HEALTH CARE EDUCATION/TRAINING PROGRAM

## 2025-07-16 PROCEDURE — 25500020 PHARM REV CODE 255: Mod: HCNC | Performed by: STUDENT IN AN ORGANIZED HEALTH CARE EDUCATION/TRAINING PROGRAM

## 2025-07-16 PROCEDURE — 63600175 PHARM REV CODE 636 W HCPCS: Mod: HCNC

## 2025-07-16 PROCEDURE — 93010 ELECTROCARDIOGRAM REPORT: CPT | Mod: HCNC,,, | Performed by: INTERNAL MEDICINE

## 2025-07-16 PROCEDURE — 36415 COLL VENOUS BLD VENIPUNCTURE: CPT | Mod: HCNC

## 2025-07-16 PROCEDURE — 80048 BASIC METABOLIC PNL TOTAL CA: CPT | Mod: HCNC

## 2025-07-16 PROCEDURE — 25000003 PHARM REV CODE 250: Mod: HCNC

## 2025-07-16 PROCEDURE — 93005 ELECTROCARDIOGRAM TRACING: CPT | Mod: HCNC

## 2025-07-16 PROCEDURE — 99232 SBSQ HOSP IP/OBS MODERATE 35: CPT | Mod: HCNC,,,

## 2025-07-16 PROCEDURE — 80197 ASSAY OF TACROLIMUS: CPT | Mod: HCNC

## 2025-07-16 PROCEDURE — 84100 ASSAY OF PHOSPHORUS: CPT | Mod: HCNC

## 2025-07-16 PROCEDURE — A9698 NON-RAD CONTRAST MATERIALNOC: HCPCS | Mod: HCNC | Performed by: STUDENT IN AN ORGANIZED HEALTH CARE EDUCATION/TRAINING PROGRAM

## 2025-07-16 PROCEDURE — 11000001 HC ACUTE MED/SURG PRIVATE ROOM: Mod: HCNC

## 2025-07-16 PROCEDURE — 85025 COMPLETE CBC W/AUTO DIFF WBC: CPT | Mod: HCNC

## 2025-07-16 PROCEDURE — 83735 ASSAY OF MAGNESIUM: CPT | Mod: HCNC

## 2025-07-16 RX ORDER — VALSARTAN 40 MG/1
160 TABLET ORAL 2 TIMES DAILY
Status: DISCONTINUED | OUTPATIENT
Start: 2025-07-16 | End: 2025-07-18 | Stop reason: HOSPADM

## 2025-07-16 RX ORDER — POTASSIUM CHLORIDE 20 MEQ/1
40 TABLET, EXTENDED RELEASE ORAL ONCE
Status: COMPLETED | OUTPATIENT
Start: 2025-07-16 | End: 2025-07-16

## 2025-07-16 RX ADMIN — HYDRALAZINE HYDROCHLORIDE 100 MG: 50 TABLET ORAL at 04:07

## 2025-07-16 RX ADMIN — TACROLIMUS 1 MG: 1 CAPSULE ORAL at 05:07

## 2025-07-16 RX ADMIN — TACROLIMUS 1 MG: 1 CAPSULE ORAL at 10:07

## 2025-07-16 RX ADMIN — ASPIRIN 81 MG CHEWABLE TABLET 81 MG: 81 TABLET CHEWABLE at 10:07

## 2025-07-16 RX ADMIN — RIVAROXABAN 15 MG: 15 TABLET, FILM COATED ORAL at 05:07

## 2025-07-16 RX ADMIN — VALSARTAN 160 MG: 40 TABLET, FILM COATED ORAL at 08:07

## 2025-07-16 RX ADMIN — ATORVASTATIN CALCIUM 80 MG: 40 TABLET, FILM COATED ORAL at 10:07

## 2025-07-16 RX ADMIN — ACETAMINOPHEN 650 MG: 325 TABLET ORAL at 07:07

## 2025-07-16 RX ADMIN — FUROSEMIDE 60 MG: 40 TABLET ORAL at 05:07

## 2025-07-16 RX ADMIN — VALSARTAN 160 MG: 40 TABLET, FILM COATED ORAL at 11:07

## 2025-07-16 RX ADMIN — HYDRALAZINE HYDROCHLORIDE 100 MG: 50 TABLET ORAL at 02:07

## 2025-07-16 RX ADMIN — FUROSEMIDE 60 MG: 40 TABLET ORAL at 07:07

## 2025-07-16 RX ADMIN — POTASSIUM CHLORIDE 40 MEQ: 1500 TABLET, EXTENDED RELEASE ORAL at 10:07

## 2025-07-16 RX ADMIN — BARIUM SULFATE 200 ML: 0.81 POWDER, FOR SUSPENSION ORAL at 11:07

## 2025-07-16 RX ADMIN — HYDRALAZINE HYDROCHLORIDE 100 MG: 50 TABLET ORAL at 09:07

## 2025-07-16 RX ADMIN — FERROUS SULFATE TAB EC 325 MG (65 MG FE EQUIVALENT) 1 EACH: 325 (65 FE) TABLET DELAYED RESPONSE at 10:07

## 2025-07-16 RX ADMIN — AMOXICILLIN AND CLAVULANATE POTASSIUM 500 MG: 500; 125 TABLET, FILM COATED ORAL at 08:07

## 2025-07-16 RX ADMIN — AMOXICILLIN AND CLAVULANATE POTASSIUM 500 MG: 500; 125 TABLET, FILM COATED ORAL at 10:07

## 2025-07-16 RX ADMIN — PREDNISONE 5 MG: 5 TABLET ORAL at 10:07

## 2025-07-16 RX ADMIN — DOXAZOSIN 16 MG: 8 TABLET ORAL at 08:07

## 2025-07-16 RX ADMIN — DOXYCYCLINE HYCLATE 100 MG: 100 TABLET, COATED ORAL at 10:07

## 2025-07-16 RX ADMIN — ISOSORBIDE MONONITRATE 120 MG: 30 TABLET, EXTENDED RELEASE ORAL at 07:07

## 2025-07-16 RX ADMIN — DOXYCYCLINE HYCLATE 100 MG: 100 TABLET, COATED ORAL at 08:07

## 2025-07-16 NOTE — ASSESSMENT & PLAN NOTE
Endocrinology consulted for BG management.   BG goal 140-180     - Home insulin pump   - BG checks /HS/0200  - Hypoglycemia protocol in place    ** Please notify Endocrine for any change and/or advance in diet**  ** Please call Endocrine for any BG related issues **    Discharge Planning:   TBD. Please notify endocrinology prior to discharge.

## 2025-07-16 NOTE — ASSESSMENT & PLAN NOTE
Fever   Unclear etiology, patient without symptoms to guide possible cause  - febrile to 101.4F on admission  - CBC without leukocytosis  - CT head without acute process  -CT C/A/P concerning for asp vs multifocal PNA and renal mass    -possible component of malignancy causing fevers   -will continue Doxycycline and Augmentin   -no further episodes of fever   -Pulm consulted for recurrent asp PNA. Esophagram ordered.   -Cleared by ST without swallowing issues   -another fever 7/16-discussed with ID. Will continue to monitor at this time. (Possibly multifactorial given concern of malignancy)

## 2025-07-16 NOTE — SUBJECTIVE & OBJECTIVE
Interval History: Concern this AM of third degree AV block on Tele. EKG with just first degree AV block. Discussed with Cards no clear third degree block on tele. Asymptomatic. Will continue to hold BB at discharge. Had one fever this AM. Will continue to monitor.     Review of Systems  Objective:     Vital Signs (Most Recent):  Temp: 98.2 °F (36.8 °C) (07/16/25 1139)  Pulse: 68 (07/16/25 1139)  Resp: 20 (07/16/25 1139)  BP: (!) 168/78 (07/16/25 1139)  SpO2: 97 % (07/16/25 1139) Vital Signs (24h Range):  Temp:  [97.3 °F (36.3 °C)-101.5 °F (38.6 °C)] 98.2 °F (36.8 °C)  Pulse:  [61-80] 68  Resp:  [11-25] 20  SpO2:  [95 %-98 %] 97 %  BP: (166-226)/(74-98) 168/78     Weight: 87.7 kg (193 lb 5.5 oz)  Body mass index is 25.51 kg/m².    Intake/Output Summary (Last 24 hours) at 7/16/2025 1232  Last data filed at 7/16/2025 1021  Gross per 24 hour   Intake 150 ml   Output 1075 ml   Net -925 ml         Physical Exam  Constitutional:       Appearance: Normal appearance.   HENT:      Head: Normocephalic and atraumatic.      Nose: Nose normal.      Mouth/Throat:      Mouth: Mucous membranes are moist.   Eyes:      Extraocular Movements: Extraocular movements intact.      Pupils: Pupils are equal, round, and reactive to light.   Cardiovascular:      Rate and Rhythm: Normal rate and regular rhythm.      Heart sounds: No murmur heard.     No gallop.   Pulmonary:      Effort: Pulmonary effort is normal.      Breath sounds: Normal breath sounds. No wheezing or rales.   Abdominal:      General: Abdomen is flat. Bowel sounds are normal. There is no distension.      Palpations: Abdomen is soft.      Tenderness: There is no abdominal tenderness.   Musculoskeletal:         General: No swelling or tenderness. Normal range of motion.      Cervical back: Normal range of motion and neck supple.      Right lower leg: Edema present.      Left lower leg: Edema present.      Comments: Edema improving   Skin:     General: Skin is warm and dry.       Capillary Refill: Capillary refill takes less than 2 seconds.   Neurological:      General: No focal deficit present.      Mental Status: He is alert. Mental status is at baseline.   Psychiatric:         Mood and Affect: Mood normal.               Significant Labs: All pertinent labs within the past 24 hours have been reviewed.    Significant Imaging: I have reviewed all pertinent imaging results/findings within the past 24 hours.

## 2025-07-16 NOTE — ASSESSMENT & PLAN NOTE
Patient has paroxysmal (<7 days) atrial fibrillation. Patient is currently in atrial fibrillation. [id number] Score: 3. The patients heart rate in the last 24 hours is as follows:  Pulse  Min: 61  Max: 80  Antiarrhythmics     Anticoagulants  rivaroxaban tablet 15 mg, With dinner, Oral    Plan  - Replete lytes with a goal of K>4, Mg >2  - Patient is anticoagulated, see medications listed above.  - Patient's afib is currently controlled  - hold bb given concerns of latha cardia and pauses on tele. Will follow up with cardiology outpatient.

## 2025-07-16 NOTE — ASSESSMENT & PLAN NOTE
Patient has a current diagnosis of Hypertensive emergency with end organ damage evidenced by acute kidney injury which is controlled.  Latest blood pressure and vitals reviewed-   Temp:  [97.3 °F (36.3 °C)-101.5 °F (38.6 °C)]   Pulse:  [61-80]   Resp:  [11-25]   BP: (166-226)/(74-98)   SpO2:  [95 %-98 %] .   Patient currently off IV antihypertensives.   Home meds for hypertension were reviewed and noted below.   holding BB due to pauses on tele    -cannot tolerate CCB   -will increase doxazosin and start hydralazine 100 mg TID and Imdur 60, will increase   -restart ARB since PHUONG is improving

## 2025-07-16 NOTE — PROGRESS NOTES
"[facility] - [facility]  Nephrology  Progress Note    Patient Name: Ravi Zamorano  MRN: [id number]  Admission Date: 7/12/2025  Hospital Length of Stay: 3 days  Attending Provider: Celine Rodriguez MD   Primary Care Physician: Mima Mack MD  Principal Problem:Acute on chronic diastolic congestive heart failure    Subjective:     HPI: No notes on file      Objective:     Vital Signs (Most Recent):  Temp: 98.1 °F (36.7 °C) (07/16/25 0436)  Pulse: 80 (07/16/25 0436)  Resp: 18 (07/16/25 0436)  BP: (!) 196/88 (07/16/25 0436)  SpO2: 95 % (07/16/25 0436) Vital Signs (24h Range):  Temp:  [97.3 °F (36.3 °C)-98.5 °F (36.9 °C)] 98.1 °F (36.7 °C)  Pulse:  [61-87] 80  Resp:  [17-23] 18  SpO2:  [95 %-98 %] 95 %  BP: (175-211)/(74-96) 196/88     Weight: 87.7 kg (193 lb 5.5 oz)  Body mass index is 25.51 kg/m².     Physical Exam  Constitutional:       General: He is not in acute distress.     Appearance: He is well-developed.   HENT:      Head: Normocephalic.   Cardiovascular:      Rate and Rhythm: Normal rate and regular rhythm.      Heart sounds: Normal heart sounds.   Pulmonary:      Effort: Pulmonary effort is normal. No respiratory distress.   Musculoskeletal:         General: Swelling present. Normal range of motion.   Skin:     General: Skin is warm.   Neurological:      Mental Status: He is alert.        Significant Labs: All pertinent labs within the past 24 hours have been reviewed.  CBC:   Recent Labs   Lab 07/14/25  0739 07/15/25  0449 07/16/25  0251   WBC 5.82 6.14 6.07   HGB 9.0* 9.2* 9.0*   HCT 30.4* 29.8* 29.1*   * 131* 152     CMP:   Recent Labs   Lab 07/14/25  0739 07/15/25  0449 07/16/25  0251    137 137   K 3.4* 4.1 3.4*    103 103   CO2 24 23 20*   * 156* 134*   BUN 61* 63* 64*   CREATININE 3.5* 3.5* 3.4*   CALCIUM 8.5* 8.4* 8.5*   ANIONGAP 12 11 14     Cardiac Markers:   No results for input(s): "CKMB", "MYOGLOBIN", "BNP", "TROPISTAT" in the last 48 " "hours.    Troponin:   No results for input(s): "TROPONINI", "TROPONINIHS" in the last 48 hours.    TSH:   Recent Labs   Lab 07/12/25 2021   TSH 1.684     Urine Studies:   No results for input(s): "COLORU", "APPEARANCEUA", "PHUR", "SPECGRAV", "PROTEINUA", "GLUCUA", "KETONESU", "BILIRUBINUA", "OCCULTUA", "NITRITE", "UROBILINOGEN", "LEUKOCYTESUR", "RBCUA", "WBCUA", "BACTERIA", "SQUAMEPITHEL", "HYALINECASTS" in the last 48 hours.    Invalid input(s): "WRIGHTSUR"      Assessment/Plan:   PHUONG on CKD4 s/p kidney transplant 2016 BL Cr 2.6-3.1  CAP  HFpEF  IDDM2  Afib long term AC xarelto + AICD  Long term drug induced immunosuppression  L renal mass     -PHUONG 2/2 CRS vs HTN crisis. BL Cr 2.6-3.1. Cont diuretics 60 mg iv lasix bid. /1850. Cr today stable at 3.4 today.   -daily prograf level. Tac level yesterday 7.2, 8.8 today at 3 am. On tac 1/1, prednisone 5 mg qd, 500 mg bid MMF at home. Currently holding MMF d/t PNA. On doxy/augmentin for CAP.   -L renal mass noted on native kidney, suspicious for RCC. WOO w urology/onc in outpatient.     Thank you for your consult. I will follow-up with patient. Please contact us if you have any additional questions.    Norberto Rand MD  Nephrology  [facility]  "

## 2025-07-16 NOTE — ASSESSMENT & PLAN NOTE
- Patient's FSGs are controlled on current hypoglycemics.   - Last A1c reviewed- 6.5  Maintain anti-hyperglycemic dose as follows-   - endocrine consulted for insulin pump management. On home insulin pump

## 2025-07-16 NOTE — ASSESSMENT & PLAN NOTE
The patients 3 most recent labs are listed below.  Recent Labs     07/14/25  0739 07/15/25  0449 07/16/25  0251   * 131* 152     Plan  - Will transfuse if platelet count is <10k.  - daily CBC

## 2025-07-16 NOTE — PHARMACY MED REC
"Admission Medication History     The home medication history was taken by Veronique Harris.    You may go to "Admission" then "Reconcile Home Medications" tabs to review and/or act upon these items.     The home medication list has been updated by the Pharmacy department.   Please read ALL comments highlighted in yellow.   Please address this information as you see fit.    Feel free to contact us if you have any questions or require assistance.      The medications listed below were removed from the home medication list. Please reorder if appropriate:  Patient reports no longer taking the following medication(s):  METOLAZONE 2.5 MG    Medications listed below were obtained from: Patient/family and Analytic software- [employer]    PTA Medications   Medication Sig    acetaminophen (TYLENOL) 500 MG tablet Take 500 mg by mouth every 6 (six) hours as needed for Pain.    aspirin 81 mg Tab Take 81 mg by mouth once daily.    atorvastatin (LIPITOR) 80 MG tablet Take 1 tablet (80 mg total) by mouth once daily.    doxazosin (CARDURA) 8 MG Tab Take 1 tablet (8 mg total) by mouth once daily.    empagliflozin (JARDIANCE) 10 mg tablet Take 1 tablet (10 mg total) by mouth once daily.    ergocalciferol (VITAMIN D2) 50,000 unit Cap Take 1 capsule (50,000 Units total) by mouth every 7 days.    famotidine (PEPCID) 20 MG tablet Take 1 tablet by mouth every evening.    ferrous sulfate (FEOSOL) 325 mg (65 mg iron) Tab tablet Take 1 tablet (325 mg total) by mouth daily with breakfast.    meclizine (ANTIVERT) 25 mg tablet Take 1 tablet (25 mg total) by mouth 3 (three) times daily as needed for Dizziness.    melatonin 3 mg Cap Take 2 capsules by mouth nightly as needed (Insomnia).    metoprolol tartrate (LOPRESSOR) 25 MG tablet Take 1 tablet (25 mg total) by mouth 2 (two) times daily.    polyethylene glycol (MIRALAX) 17 gram PwPk Take 17 g by mouth daily as needed for Constipation. Take 17 gm (mixed in liquid) by mouth every day.    predniSONE " "(DELTASONE) 5 MG tablet Take 1 tablet (5 mg total) by mouth once daily.    rivaroxaban (XARELTO) 15 mg Tab Take 1 tablet (15 mg total) by mouth daily with dinner or evening meal.    tacrolimus (PROGRAF) 1 MG Cap Take 1 capsule (1 mg total) by mouth every 12 (twelve) hours.    torsemide (DEMADEX) 20 MG Tab Take 2 tablets (40 mg total) by mouth once daily.    blood sugar diagnostic Strp Use to check blood glucose 1 times daily, to use with insurance preferred meter    blood-glucose meter Misc Use to check blood glucose 1 times daily, to use with insurance preferred meter    blood-glucose sensor (DEXCOM G7 SENSOR) Kayla Change sensor every 10 days.    gabapentin (NEURONTIN) 300 MG capsule Take 1 capsule (300 mg total) by mouth 2 (two) times daily.    insulin aspart U-100 (NOVOLOG FLEXPEN U-100 INSULIN) 100 unit/mL (3 mL) InPn pen Use pump. Max daily 100 units    insulin aspart U-100 (NOVOLOG U-100 INSULIN ASPART) 100 unit/mL injection Use in pump, max daily 100 units.    insulin pump cart,auto,BT,G6/7 (OMNIPOD 5 G6-G7 PODS, GEN 5,) Crtg Change every 48 hours.    isosorbide-hydrALAZINE 20-37.5 mg (BIDIL) 20-37.5 mg Tab Take 2 tablets by mouth 3 (three) times daily.    lancets 30 gauge Misc Use to check blood glucose 1 times daily, to use with insurance preferred meter    magnesium oxide (MAG-OX) 400 mg (241.3 mg magnesium) tablet Take 1 tablet (400 mg total) by mouth 2 (two) times daily.    mycophenolate (CELLCEPT) 250 mg Cap Take 500 mg by mouth 2 (two) times daily.    pen needle, diabetic (BD ULTRA-FINE LO PEN NEEDLE) 32 gauge x 5/32" Ndle Use to administer insulin 4 times daily.               Veronique Harris  [id number]                  .          "

## 2025-07-16 NOTE — SUBJECTIVE & OBJECTIVE
"  Objective:     Vital Signs (Most Recent):  Temp: 98.1 °F (36.7 °C) (07/16/25 0436)  Pulse: 80 (07/16/25 0436)  Resp: 18 (07/16/25 0436)  BP: (!) 196/88 (07/16/25 0436)  SpO2: 95 % (07/16/25 0436) Vital Signs (24h Range):  Temp:  [97.3 °F (36.3 °C)-98.5 °F (36.9 °C)] 98.1 °F (36.7 °C)  Pulse:  [61-87] 80  Resp:  [17-23] 18  SpO2:  [95 %-98 %] 95 %  BP: (175-211)/(74-96) 196/88     Weight: 87.7 kg (193 lb 5.5 oz)  Body mass index is 25.51 kg/m².     Physical Exam  Constitutional:       General: He is not in acute distress.     Appearance: He is well-developed.   HENT:      Head: Normocephalic.   Cardiovascular:      Rate and Rhythm: Normal rate and regular rhythm.      Heart sounds: Normal heart sounds.   Pulmonary:      Effort: Pulmonary effort is normal. No respiratory distress.   Musculoskeletal:         General: Swelling present. Normal range of motion.   Skin:     General: Skin is warm.   Neurological:      Mental Status: He is alert.        Significant Labs: All pertinent labs within the past 24 hours have been reviewed.  CBC:   Recent Labs   Lab 07/14/25  0739 07/15/25  0449 07/16/25  0251   WBC 5.82 6.14 6.07   HGB 9.0* 9.2* 9.0*   HCT 30.4* 29.8* 29.1*   * 131* 152     CMP:   Recent Labs   Lab 07/14/25  0739 07/15/25  0449 07/16/25  0251    137 137   K 3.4* 4.1 3.4*    103 103   CO2 24 23 20*   * 156* 134*   BUN 61* 63* 64*   CREATININE 3.5* 3.5* 3.4*   CALCIUM 8.5* 8.4* 8.5*   ANIONGAP 12 11 14     Cardiac Markers:   No results for input(s): "CKMB", "MYOGLOBIN", "BNP", "TROPISTAT" in the last 48 hours.    Troponin:   No results for input(s): "TROPONINI", "TROPONINIHS" in the last 48 hours.    TSH:   Recent Labs   Lab 07/12/25 2021   TSH 1.684     Urine Studies:   No results for input(s): "COLORU", "APPEARANCEUA", "PHUR", "SPECGRAV", "PROTEINUA", "GLUCUA", "KETONESU", "BILIRUBINUA", "OCCULTUA", "NITRITE", "UROBILINOGEN", "LEUKOCYTESUR", "RBCUA", "WBCUA", "BACTERIA", " ""SQUAMEPITHEL", "HYALINECASTS" in the last 48 hours.    Invalid input(s): "KARLOS"      "

## 2025-07-16 NOTE — SUBJECTIVE & OBJECTIVE
"Interval HPI:   No acute events overnight. Patient in room 31084/09170 A. Blood glucose stable. BG at goal on current insulin regimen (Home insulin pump). Steroid use- Prednisone 5 mg daily.    Renal function-  Lab Results   Component Value Date    CREATININE 3.4 (H) 2025       Vasopressors-  None      Endocrine will continue to follow and manage insulin orders inpatient.      Diet Low Sodium (2 gm) Fluid - 1500mL      Eatin%  Nausea: No  Hypoglycemia and intervention: No  Fever: No  TPN and/or TF: No  If yes, type of TF/TPN and rate: N/A    BP (!) 186/86   Pulse 68   Temp 100.2 °F (37.9 °C)   Resp 11   Ht 6' 1" (1.854 m)   Wt 87.7 kg (193 lb 5.5 oz)   SpO2 95%   BMI 25.51 kg/m²     Labs Reviewed and Include    Recent Labs   Lab 25  0251   *   CALCIUM 8.5*      K 3.4*   CO2 20*      BUN 64*   CREATININE 3.4*     Lab Results   Component Value Date    WBC 6.07 2025    HGB 9.0 (L) 2025    HCT 29.1 (L) 2025    MCV 70 (L) 2025     2025     Recent Labs   Lab 25   TSH 1.684     Lab Results   Component Value Date    HGBA1C 6.5 (H) 2025       Nutritional status:   Body mass index is 25.51 kg/m².  Lab Results   Component Value Date    ALBUMIN 3.1 (L) 2025    ALBUMIN 3.4 (L) 2025    ALBUMIN 3.1 (L) 2025     No results found for: "PREALBUMIN"    Estimated Creatinine Clearance: 23.8 mL/min (A) (based on SCr of 3.4 mg/dL (H)).    Accu-Checks  Recent Labs     25  1331 25  1638 25  1745 25  2010 07/15/25  0202 07/15/25  0731 07/15/25  1123 07/15/25  1652 07/15/25  2104 25  0211   POCTGLUCOSE 209* 296* 282* 282* 197* 151* 173* 181* 283* 142*       Current Medications and/or Treatments Impacting Glycemic Control  Immunotherapy:    Immunosuppressants           Stop Route Frequency     tacrolimus capsule 1 mg         -- Oral 2 times daily          Steroids:   Hormones (From admission, " onward)      Start     Stop Route Frequency Ordered    07/13/25 0900  predniSONE tablet 5 mg         -- Oral Daily 07/13/25 0543    07/13/25 0138  melatonin tablet 6 mg         -- Oral Nightly PRN 07/13/25 0040          Pressors:    Autonomic Drugs (From admission, onward)      None          Hyperglycemia/Diabetes Medications:   Antihyperglycemics (From admission, onward)      Start     Stop Route Frequency Ordered    07/15/25 1630  insulin aspart U-100 insulin pump from home        Question Answer Comment   Target number 110    Basal Rate #1 0.6    Basal rate #1 time 0000        -- SubQ Continuous 07/15/25 1521    07/15/25 1620  insulin aspart U-100 insulin pump from home 0-10 Units        Question Answer Comment   Target number 110    Carbohydrate coverage #1 7.5    Carbohydrate coverage #1 time 0000    Sensitivity #1 40    Sensitivity #1 time 0000        -- SubQ As needed (PRN) 07/15/25 1521

## 2025-07-16 NOTE — CARE UPDATE
"RAPID RESPONSE NURSE CHART REVIEW        Chart Reviewed: 07/16/2025, 8:01 AM    MRN: [id number]  Bed: 63484/06595 A    Dx: Acute on chronic diastolic congestive heart failure    Ravi Zamorano has a past medical history of Anxiety, Arthritis, atrial fibrillation, Bilateral diabetic retinopathy, Cardioembolic stroke, CKD (chronic kidney disease), Colon polyps, Depression - situational, Diabetes type 2, Encounter for blood transfusion, History of hepatitis C, s/p successful Rx w/ SVR24 - 9/2017, Hyperlipidemia, Hypertension, Hyponatremia, Pancreatitis, S/P cadaveric kidney transplant 11/5/2016. ESRD secondary to HTN/DMII, and Stroke.    Last VS: BP (!) 211/84   Pulse 75   Temp (!) 101.5 °F (38.6 °C) (Oral)   Resp (!) 25   Ht 6' 1" (1.854 m)   Wt 87.7 kg (193 lb 5.5 oz)   SpO2 95%   BMI 25.51 kg/m²     24H Vital Sign Range:  Temp:  [97.3 °F (36.3 °C)-101.5 °F (38.6 °C)]   Pulse:  [61-82]   Resp:  [17-25]   BP: (175-211)/(74-96)   SpO2:  [95 %-98 %]     Level of Consciousness (AVPU): alert    Recent Labs     07/14/25  0739 07/15/25  0449 07/16/25  0251   WBC 5.82 6.14 6.07   HGB 9.0* 9.2* 9.0*   HCT 30.4* 29.8* 29.1*   * 131* 152       Recent Labs     07/14/25  0739 07/15/25  0449 07/16/25  0251    137 137   K 3.4* 4.1 3.4*    103 103   CO2 24 23 20*   BUN 61* 63* 64*   CREATININE 3.5* 3.5* 3.4*   * 156* 134*   PHOS 3.4 3.4 3.1   MG 1.9 2.0 1.9          MEWS score: 1    Rounding completed at 0900.    Rounding completed with charge nurse Lacey for fever/hypertension reports patient febrile on admit with continuing workup and antibiotic therapy, adjusting oral hypertensive regimen for BP control. No acute concerns verbalized at this time. Instructed to call [phone] for further concerns or assistance.    Britney Jennings RN      "

## 2025-07-16 NOTE — PLAN OF CARE
Pt aaox4. O2 >95% on RA with no distress noted. Tele-afib. Pt was made aware of becoming Npo @ [facility] r/t an upcoming esophogram and was compliant with care. CBG monitoring remain in place with insulin administered per pt at home insulin pump. VSS besides ongoing hypertension. MD is aware of BP. No acute changes during this shift. No known needs at this time.       Problem: Adult Inpatient Plan of Care  Goal: Plan of Care Review  Outcome: Progressing  Goal: Patient-Specific Goal (Individualized)  Outcome: Progressing  Goal: Absence of Hospital-Acquired Illness or Injury  Outcome: Progressing  Goal: Optimal Comfort and Wellbeing  Outcome: Progressing  Goal: Readiness for Transition of Care  Outcome: Progressing     Problem: Infection  Goal: Absence of Infection Signs and Symptoms  Outcome: Progressing     Problem: Diabetes Comorbidity  Goal: Blood Glucose Level Within Targeted Range  Outcome: Progressing     Problem: Acute Kidney Injury/Impairment  Goal: Fluid and Electrolyte Balance  Outcome: Progressing  Goal: Improved Oral Intake  Outcome: Progressing  Goal: Effective Renal Function  Outcome: Progressing     Problem: Pneumonia  Goal: Fluid Balance  Outcome: Progressing  Goal: Resolution of Infection Signs and Symptoms  Outcome: Progressing  Goal: Effective Oxygenation and Ventilation  Outcome: Progressing     Problem: Fall Injury Risk  Goal: Absence of Fall and Fall-Related Injury  Outcome: Progressing     Problem: Dysrhythmia  Goal: Normalized Cardiac Rhythm  Outcome: Progressing     Problem: Heart Failure  Goal: Optimal Coping  Outcome: Progressing  Goal: Optimal Cardiac Output  Outcome: Progressing  Goal: Stable Heart Rate and Rhythm  Outcome: Progressing  Goal: Optimal Functional Ability  Outcome: Progressing  Goal: Fluid and Electrolyte Balance  Outcome: Progressing  Goal: Improved Oral Intake  Outcome: Progressing  Goal: Effective Oxygenation and Ventilation  Outcome: Progressing  Goal: Effective Breathing  Pattern During Sleep  Outcome: Progressing

## 2025-07-16 NOTE — NURSING
Pt /96, as pt is asymptomatic. Anusha Deras PA-C notified with orders to continue monitor and recheck BP in an hour. Order carried out.

## 2025-07-16 NOTE — ASSESSMENT & PLAN NOTE
Patient's most recent potassium results are listed below.   Recent Labs     07/14/25  0739 07/15/25  0449 07/16/25  0251   K 3.4* 4.1 3.4*     Plan  - Replete potassium per protocol  - Monitor potassium Daily  - Patient's hypokalemia is stable

## 2025-07-16 NOTE — ASSESSMENT & PLAN NOTE
- CHF pathway initiated   - CXR revealed Cardiomegaly with interstitial edema,   - BNP 4,888 / troponin 205>>220  - EKG with atrial fibrillation recurrence (recent ablation 04/25)  - last echo showing ejection fraction of 50 - 55%.   - repeat TTE ordered with 50-55% and grade III diastolic dysfunction. Elevated pulmonary artery pressure   -  de escalate to PO lasix 60 mg BID   - strict Is/Os   - cardiac, low sodium diet  - fluid restrict 1.5L while inpatient  - daily CMP   - monitor on cardiac telemetry   - supplemental O2 as needed

## 2025-07-16 NOTE — ASSESSMENT & PLAN NOTE
Suspect etiology cardiorenal.    - Cr 3.9.  Baseline Cr 3.1   -CR close to baseline   - UA unremarkable   - renal transplant US ordered. Renal US with renal mass   - nephrology consulted, appreciate recommendations   - Monitor UOP and follow serial BMP   - Adjust drugs to GFR/CrCL; avoid nephrotoxic drugs   -  Estimated Creatinine Clearance: 23.8 mL/min (A) (based on SCr of 3.4 mg/dL (H)).   - Monitor electrolytes, phos levels daily  -improving with diuresis and BP control

## 2025-07-16 NOTE — ASSESSMENT & PLAN NOTE
Patient has paroxysmal (<7 days) atrial fibrillation. Patient is currently in atrial fibrillation. [id number] Score: 3. The patients heart rate in the last 24 hours is as follows:  Pulse  Min: 61  Max: 80  Antiarrhythmics     Anticoagulants  rivaroxaban tablet 15 mg, With dinner, Oral    Plan  - Replete lytes with a goal of K>4, Mg >2  - Patient is anticoagulated, see medications listed above.  - Patient's afib is currently controlled  - hold bb given concerns of latha cardia and pauses on tele. Will follow up with cardiology outpatient.

## 2025-07-16 NOTE — ASSESSMENT & PLAN NOTE
Anemia is likely due to chronic disease due to Chronic Kidney Disease. Most recent hemoglobin and hematocrit are listed below.  Recent Labs     07/14/25  0739 07/15/25  0449 07/16/25  0251   HGB 9.0* 9.2* 9.0*   HCT 30.4* 29.8* 29.1*     Plan  - Monitor serial CBC: Daily  - Transfuse PRBC if patient becomes hemodynamically unstable, symptomatic or H/H drops below 7/21.  - Patient has not received any PRBC transfusions to date  - Patient's anemia is currently stable

## 2025-07-16 NOTE — PROGRESS NOTES
"[facility]  Endocrinology  Progress Note    Admit Date: 2025     Reason for Consult: Management of T2DM, Hyperglycemia      Diabetes diagnosis year:       Home Diabetes Medications:    OMNIPOD 5  0.6 u/hr mn-0600 , 9p-mn, 0.7 u/hr 6a-9p   Target 120-130 mg/dl   Iob 3 hours  Max bolus 20 units   Max basal 2 u/hr   Isf 40 mn-mn  ICR 1 to 7.5      Presets:  Snack: 4 units (28g)  Small Meal: 8 units (56g)  Medium Meal: 12 units (90g)  Large Meal: 15 units (114g)  Super Large Meal: 18 units (130g)     How often checking glucose at home? >4 x day   BG readings on regimen: 150s  Hypoglycemia on the regimen?  No  Missed doses on regimen?  No     Diabetes Complications include:     Hyperglycemia     Complicating diabetes co morbidities:   S/p Kidney tx          HPI:Ravi Zamorano is a 69 y.o. male s/p kidney transplant in 2016 on tacro, CKD 4, HFpEF, T2DM with insulin pump use, afib on xarelto and AICD being admitted to hospital medicine for manangement of PHUONG on CKD and CHF exacerbation. Patient reports increased feelings of lightheadedness/dizziness with leaning forward the past several days. Endorses associated unsteady gait and bilateral "hand twitching". In the ED hypertensive to 230/98, febrile to Tmax 101.4 F. Endocrine consulted to manage hyperglycemia, type 2 diabetes, and insulin pump therapy  in the context of the inpatient setting.     Lab Results   Component Value Date    HGBA1C 6.5 (H) 2025           Interval HPI:   No acute events overnight. Patient in room 07560/39951 A. Blood glucose stable. BG at goal on current insulin regimen (Home insulin pump). Steroid use- Prednisone 5 mg daily.    Renal function-  Lab Results   Component Value Date    CREATININE 3.4 (H) 2025       Vasopressors-  None      Endocrine will continue to follow and manage insulin orders inpatient.      Diet Low Sodium (2 gm) Fluid - 1500mL      Eatin%  Nausea: No  Hypoglycemia and " "intervention: No  Fever: No  TPN and/or TF: No  If yes, type of TF/TPN and rate: N/A    BP (!) 186/86   Pulse 68   Temp 100.2 °F (37.9 °C)   Resp 11   Ht 6' 1" (1.854 m)   Wt 87.7 kg (193 lb 5.5 oz)   SpO2 95%   BMI 25.51 kg/m²     Labs Reviewed and Include    Recent Labs   Lab 07/16/25  0251   *   CALCIUM 8.5*      K 3.4*   CO2 20*      BUN 64*   CREATININE 3.4*     Lab Results   Component Value Date    WBC 6.07 07/16/2025    HGB 9.0 (L) 07/16/2025    HCT 29.1 (L) 07/16/2025    MCV 70 (L) 07/16/2025     07/16/2025     Recent Labs   Lab 07/12/25 2021   TSH 1.684     Lab Results   Component Value Date    HGBA1C 6.5 (H) 07/13/2025       Nutritional status:   Body mass index is 25.51 kg/m².  Lab Results   Component Value Date    ALBUMIN 3.1 (L) 07/12/2025    ALBUMIN 3.4 (L) 07/07/2025    ALBUMIN 3.1 (L) 06/27/2025     No results found for: "PREALBUMIN"    Estimated Creatinine Clearance: 23.8 mL/min (A) (based on SCr of 3.4 mg/dL (H)).    Accu-Checks  Recent Labs     07/14/25  1331 07/14/25  1638 07/14/25  1745 07/14/25  2010 07/15/25  0202 07/15/25  0731 07/15/25  1123 07/15/25  1652 07/15/25  2104 07/16/25  0211   POCTGLUCOSE 209* 296* 282* 282* 197* 151* 173* 181* 283* 142*       Current Medications and/or Treatments Impacting Glycemic Control  Immunotherapy:    Immunosuppressants           Stop Route Frequency     tacrolimus capsule 1 mg         -- Oral 2 times daily          Steroids:   Hormones (From admission, onward)      Start     Stop Route Frequency Ordered    07/13/25 0900  predniSONE tablet 5 mg         -- Oral Daily 07/13/25 0543    07/13/25 0138  melatonin tablet 6 mg         -- Oral Nightly PRN 07/13/25 0040          Pressors:    Autonomic Drugs (From admission, onward)      None          Hyperglycemia/Diabetes Medications:   Antihyperglycemics (From admission, onward)      Start     Stop Route Frequency Ordered    07/15/25 1630  insulin aspart U-100 insulin pump from " home        Question Answer Comment   Target number 110    Basal Rate #1 0.6    Basal rate #1 time 0000        -- SubQ Continuous 07/15/25 1521    07/15/25 1620  insulin aspart U-100 insulin pump from home 0-10 Units        Question Answer Comment   Target number 110    Carbohydrate coverage #1 7.5    Carbohydrate coverage #1 time 0000    Sensitivity #1 40    Sensitivity #1 time 0000        -- SubQ As needed (PRN) 07/15/25 1521            ASSESSMENT and PLAN    Cardiac/Vascular  * Acute on chronic diastolic congestive heart failure  Managed per primary team  Avoid hypoglycemia        Endocrine  Insulin pump in place  At time of evaluation, pt meets criteria to continue home insulin pump usage.  - Has all adequate supplies   - Bolus settings reviewed    - No changes to home regimen.   - Nurse to check BG qac/hs/0200 & record in epic   - Patient to input glucose into pump and use bolus wizard for prandial needs   - Will continue to monitor accuchecks and titrate insulin as clinically indicated .     - Discussed above plan with patient, patient verbalized understanding.   - Understands in case of pump malfunction or cognitive decline in which pt can no longer safely use insulin pump, will transition to SC MDI        If pump malfunctions or is disconnected, please notify the on-call provider for endocrinology.         Type 2 diabetes mellitus with stage 4 chronic kidney disease, with long-term current use of insulin  Endocrinology consulted for BG management.   BG goal 140-180     - Home insulin pump   - BG checks AC/HS/0200  - Hypoglycemia protocol in place    ** Please notify Endocrine for any change and/or advance in diet**  ** Please call Endocrine for any BG related issues **    Discharge Planning:   TBD. Please notify endocrinology prior to discharge.            Malina Slaughter PA-C  Endocrinology  [facility]

## 2025-07-17 LAB
ABSOLUTE EOSINOPHIL (OHS): 0.41 K/UL
ABSOLUTE MONOCYTE (OHS): 1.19 K/UL (ref 0.3–1)
ABSOLUTE NEUTROPHIL COUNT (OHS): 4 K/UL (ref 1.8–7.7)
ANION GAP (OHS): 11 MMOL/L (ref 8–16)
BASOPHILS # BLD AUTO: 0.04 K/UL
BASOPHILS NFR BLD AUTO: 0.6 %
BUN SERPL-MCNC: 63 MG/DL (ref 8–23)
CALCIUM SERPL-MCNC: 8.7 MG/DL (ref 8.7–10.5)
CHLORIDE SERPL-SCNC: 103 MMOL/L (ref 95–110)
CO2 SERPL-SCNC: 19 MMOL/L (ref 23–29)
CREAT SERPL-MCNC: 3.4 MG/DL (ref 0.5–1.4)
ERYTHROCYTE [DISTWIDTH] IN BLOOD BY AUTOMATED COUNT: 19 % (ref 11.5–14.5)
GFR SERPLBLD CREATININE-BSD FMLA CKD-EPI: 19 ML/MIN/1.73/M2
GLUCOSE SERPL-MCNC: 139 MG/DL (ref 70–110)
HCT VFR BLD AUTO: 31 % (ref 40–54)
HGB BLD-MCNC: 9.4 GM/DL (ref 14–18)
IMM GRANULOCYTES # BLD AUTO: 0.04 K/UL (ref 0–0.04)
IMM GRANULOCYTES NFR BLD AUTO: 0.6 % (ref 0–0.5)
LYMPHOCYTES # BLD AUTO: 1.56 K/UL (ref 1–4.8)
MAGNESIUM SERPL-MCNC: 1.8 MG/DL (ref 1.6–2.6)
MCH RBC QN AUTO: 21.6 PG (ref 27–31)
MCHC RBC AUTO-ENTMCNC: 30.3 G/DL (ref 32–36)
MCV RBC AUTO: 71 FL (ref 82–98)
NUCLEATED RBC (/100WBC) (OHS): 0 /100 WBC
PHOSPHATE SERPL-MCNC: 3.3 MG/DL (ref 2.7–4.5)
PLATELET # BLD AUTO: 167 K/UL (ref 150–450)
PMV BLD AUTO: ABNORMAL FL
POCT GLUCOSE: 152 MG/DL (ref 70–110)
POCT GLUCOSE: 186 MG/DL (ref 70–110)
POCT GLUCOSE: 200 MG/DL (ref 70–110)
POCT GLUCOSE: 210 MG/DL (ref 70–110)
POCT GLUCOSE: 255 MG/DL (ref 70–110)
POTASSIUM SERPL-SCNC: 4.8 MMOL/L (ref 3.5–5.1)
RBC # BLD AUTO: 4.35 M/UL (ref 4.6–6.2)
RELATIVE EOSINOPHIL (OHS): 5.7 %
RELATIVE LYMPHOCYTE (OHS): 21.5 % (ref 18–48)
RELATIVE MONOCYTE (OHS): 16.4 % (ref 4–15)
RELATIVE NEUTROPHIL (OHS): 55.2 % (ref 38–73)
SODIUM SERPL-SCNC: 133 MMOL/L (ref 136–145)
TACROLIMUS BLD-MCNC: 8.6 NG/ML (ref 5–15)
WBC # BLD AUTO: 7.24 K/UL (ref 3.9–12.7)

## 2025-07-17 PROCEDURE — 84100 ASSAY OF PHOSPHORUS: CPT | Mod: HCNC

## 2025-07-17 PROCEDURE — 25000003 PHARM REV CODE 250: Mod: HCNC

## 2025-07-17 PROCEDURE — 94761 N-INVAS EAR/PLS OXIMETRY MLT: CPT | Mod: HCNC

## 2025-07-17 PROCEDURE — 25000003 PHARM REV CODE 250: Mod: HCNC | Performed by: STUDENT IN AN ORGANIZED HEALTH CARE EDUCATION/TRAINING PROGRAM

## 2025-07-17 PROCEDURE — 82310 ASSAY OF CALCIUM: CPT | Mod: HCNC

## 2025-07-17 PROCEDURE — 11000001 HC ACUTE MED/SURG PRIVATE ROOM: Mod: HCNC

## 2025-07-17 PROCEDURE — 36415 COLL VENOUS BLD VENIPUNCTURE: CPT | Mod: HCNC

## 2025-07-17 PROCEDURE — 83735 ASSAY OF MAGNESIUM: CPT | Mod: HCNC

## 2025-07-17 PROCEDURE — 63600175 PHARM REV CODE 636 W HCPCS: Mod: HCNC

## 2025-07-17 PROCEDURE — 80197 ASSAY OF TACROLIMUS: CPT | Mod: HCNC

## 2025-07-17 PROCEDURE — 85025 COMPLETE CBC W/AUTO DIFF WBC: CPT | Mod: HCNC

## 2025-07-17 PROCEDURE — 99232 SBSQ HOSP IP/OBS MODERATE 35: CPT | Mod: HCNC,,,

## 2025-07-17 RX ORDER — ISOSORBIDE MONONITRATE 30 MG/1
120 TABLET, EXTENDED RELEASE ORAL DAILY
Status: DISCONTINUED | OUTPATIENT
Start: 2025-07-17 | End: 2025-07-18 | Stop reason: HOSPADM

## 2025-07-17 RX ORDER — ACETAMINOPHEN 500 MG
500 TABLET ORAL ONCE
Status: DISCONTINUED | OUTPATIENT
Start: 2025-07-17 | End: 2025-07-18 | Stop reason: HOSPADM

## 2025-07-17 RX ADMIN — FUROSEMIDE 60 MG: 40 TABLET ORAL at 09:07

## 2025-07-17 RX ADMIN — FUROSEMIDE 60 MG: 40 TABLET ORAL at 05:07

## 2025-07-17 RX ADMIN — DOXAZOSIN 16 MG: 8 TABLET ORAL at 09:07

## 2025-07-17 RX ADMIN — TACROLIMUS 1 MG: 1 CAPSULE ORAL at 09:07

## 2025-07-17 RX ADMIN — ACETAMINOPHEN 650 MG: 325 TABLET ORAL at 09:07

## 2025-07-17 RX ADMIN — VALSARTAN 160 MG: 40 TABLET, FILM COATED ORAL at 09:07

## 2025-07-17 RX ADMIN — AMOXICILLIN AND CLAVULANATE POTASSIUM 500 MG: 500; 125 TABLET, FILM COATED ORAL at 09:07

## 2025-07-17 RX ADMIN — RIVAROXABAN 15 MG: 15 TABLET, FILM COATED ORAL at 05:07

## 2025-07-17 RX ADMIN — TACROLIMUS 1 MG: 1 CAPSULE ORAL at 05:07

## 2025-07-17 RX ADMIN — HYDRALAZINE HYDROCHLORIDE 100 MG: 50 TABLET ORAL at 04:07

## 2025-07-17 RX ADMIN — PREDNISONE 5 MG: 5 TABLET ORAL at 09:07

## 2025-07-17 RX ADMIN — HYDRALAZINE HYDROCHLORIDE 100 MG: 50 TABLET ORAL at 02:07

## 2025-07-17 RX ADMIN — DOXYCYCLINE HYCLATE 100 MG: 100 TABLET, COATED ORAL at 09:07

## 2025-07-17 RX ADMIN — ATORVASTATIN CALCIUM 80 MG: 40 TABLET, FILM COATED ORAL at 09:07

## 2025-07-17 RX ADMIN — ASPIRIN 81 MG CHEWABLE TABLET 81 MG: 81 TABLET CHEWABLE at 09:07

## 2025-07-17 RX ADMIN — ISOSORBIDE MONONITRATE 120 MG: 30 TABLET, EXTENDED RELEASE ORAL at 06:07

## 2025-07-17 RX ADMIN — HYDRALAZINE HYDROCHLORIDE 100 MG: 50 TABLET ORAL at 09:07

## 2025-07-17 RX ADMIN — FERROUS SULFATE TAB EC 325 MG (65 MG FE EQUIVALENT) 1 EACH: 325 (65 FE) TABLET DELAYED RESPONSE at 09:07

## 2025-07-17 RX ADMIN — ACETAMINOPHEN 650 MG: 325 TABLET ORAL at 06:07

## 2025-07-17 NOTE — ASSESSMENT & PLAN NOTE
Patient has a current diagnosis of Hypertensive emergency with end organ damage evidenced by acute kidney injury which is controlled.  Latest blood pressure and vitals reviewed-   Temp:  [97.4 °F (36.3 °C)-99.1 °F (37.3 °C)]   Pulse:  []   Resp:  [12-26]   BP: (166-214)/()   SpO2:  [95 %-99 %] .   Patient currently off IV antihypertensives.   Home meds for hypertension were reviewed and noted below.   holding BB due to pauses on tele    -cannot tolerate CCB   -will increase doxazosin and start hydralazine 100 mg TID and Imdur 60, will increase   -restart ARB since PHUONG is improving

## 2025-07-17 NOTE — ASSESSMENT & PLAN NOTE
Patient has paroxysmal (<7 days) atrial fibrillation. Patient is currently in atrial fibrillation. [id number] Score: 3. The patients heart rate in the last 24 hours is as follows:  Pulse  Min: 62  Max: 102  Antiarrhythmics     Anticoagulants  rivaroxaban tablet 15 mg, With dinner, Oral    Plan  - Replete lytes with a goal of K>4, Mg >2  - Patient is anticoagulated, see medications listed above.  - Patient's afib is currently controlled  - hold bb given concerns of latha cardia and pauses on tele. Will follow up with cardiology outpatient.

## 2025-07-17 NOTE — SUBJECTIVE & OBJECTIVE
"  Objective:     Vital Signs (Most Recent):  Temp: 98 °F (36.7 °C) (07/17/25 0431)  Pulse: 76 (07/17/25 0700)  Resp: (!) 22 (07/17/25 0609)  BP: (!) 212/95 (07/17/25 0609)  SpO2: 98 % (07/17/25 0431) Vital Signs (24h Range):  Temp:  [97.4 °F (36.3 °C)-100.2 °F (37.9 °C)] 98 °F (36.7 °C)  Pulse:  [] 76  Resp:  [11-26] 22  SpO2:  [95 %-99 %] 98 %  BP: (166-226)/() 212/95     Weight: 87 kg (191 lb 12.8 oz)  Body mass index is 25.3 kg/m².     Physical Exam  Constitutional:       General: He is not in acute distress.     Appearance: He is well-developed.   HENT:      Head: Normocephalic.   Cardiovascular:      Rate and Rhythm: Normal rate and regular rhythm.      Heart sounds: Normal heart sounds.   Pulmonary:      Effort: Pulmonary effort is normal. No respiratory distress.   Musculoskeletal:         General: Swelling present. Normal range of motion.   Skin:     General: Skin is warm.   Neurological:      Mental Status: He is alert.        Significant Labs: All pertinent labs within the past 24 hours have been reviewed.  CBC:   Recent Labs   Lab 07/16/25 0251 07/17/25  0508   WBC 6.07 7.24   HGB 9.0* 9.4*   HCT 29.1* 31.0*    167     CMP:   Recent Labs   Lab 07/16/25  0251 07/17/25  0508    133*   K 3.4* 4.8    103   CO2 20* 19*   * 139*   BUN 64* 63*   CREATININE 3.4* 3.4*   CALCIUM 8.5* 8.7   ANIONGAP 14 11     Cardiac Markers:   No results for input(s): "CKMB", "MYOGLOBIN", "BNP", "TROPISTAT" in the last 48 hours.    Troponin:   No results for input(s): "TROPONINI", "TROPONINIHS" in the last 48 hours.    TSH:   Recent Labs   Lab 07/12/25 2021   TSH 1.684     Urine Studies:   No results for input(s): "COLORU", "APPEARANCEUA", "PHUR", "SPECGRAV", "PROTEINUA", "GLUCUA", "KETONESU", "BILIRUBINUA", "OCCULTUA", "NITRITE", "UROBILINOGEN", "LEUKOCYTESUR", "RBCUA", "WBCUA", "BACTERIA", "SQUAMEPITHEL", "HYALINECASTS" in the last 48 hours.    Invalid input(s): "WRIGHTSUR"      "

## 2025-07-17 NOTE — CARE UPDATE
11/4/21: [facility] here and completed oxygen/pulse oximeter  training with mother  CM rec'd a call from [employer] the are [facility]hene to obtain an auth from the [employer] insurance- pt no longer has it -only [facility]. SAMINA asked [facility] for An [facility]    [facility] care update note    Intake/Output Summary (Last 24 hours) at 7/17/2025 1212  Last data filed at 7/17/2025 0431  Gross per 24 hour   Intake 140 ml   Output 820 ml   Net -680 ml       Vitals:    07/17/25 0609 07/17/25 0700 07/17/25 0834 07/17/25 1110   BP: (!) 212/95  (!) 193/86    BP Location:       Patient Position:       Pulse: 87 76 73 72   Resp: (!) 22  (!) 22    Temp:   99.1 °F (37.3 °C)    TempSrc:   Oral    SpO2:   98%    Weight:       Height:           Recent Labs   Lab 07/15/25  0449 07/16/25  0251 07/17/25  0508    137 133*   K 4.1 3.4* 4.8    103 103   CO2 23 20* 19*   BUN 63* 64* 63*   CREATININE 3.5* 3.4* 3.4*   CALCIUM 8.4* 8.5* 8.7   PHOS 3.4 3.1 3.3     Labs reviewed, stable. Daily prograf level, today at 8.6. On oral diuretics. Stable UOP. Resume MMF when abx done. No new recommendations at this time, will continue to observe.    No other related issues identified. Please call Nephrology as needed; We will continue to follow.

## 2025-07-17 NOTE — ASSESSMENT & PLAN NOTE
Anemia is likely due to chronic disease due to Chronic Kidney Disease. Most recent hemoglobin and hematocrit are listed below.  Recent Labs     07/15/25  0449 07/16/25  0251 07/17/25  0508   HGB 9.2* 9.0* 9.4*   HCT 29.8* 29.1* 31.0*     Plan  - Monitor serial CBC: Daily  - Transfuse PRBC if patient becomes hemodynamically unstable, symptomatic or H/H drops below 7/21.  - Patient has not received any PRBC transfusions to date  - Patient's anemia is currently stable

## 2025-07-17 NOTE — ASSESSMENT & PLAN NOTE
Patient has paroxysmal (<7 days) atrial fibrillation. Patient is currently in atrial fibrillation. [id number] Score: 3. The patients heart rate in the last 24 hours is as follows:  Pulse  Min: 62  Max: 102  Antiarrhythmics     Anticoagulants  rivaroxaban tablet 15 mg, With dinner, Oral    Plan  - Replete lytes with a goal of K>4, Mg >2  - Patient is anticoagulated, see medications listed above.  - Patient's afib is currently controlled  - hold bb given concerns of latha cardia and pauses on tele. Will follow up with cardiology outpatient.

## 2025-07-17 NOTE — ASSESSMENT & PLAN NOTE
Patient's most recent potassium results are listed below.   Recent Labs     07/15/25  0449 07/16/25  0251 07/17/25  0508   K 4.1 3.4* 4.8     Plan  - Replete potassium per protocol  - Monitor potassium Daily  - Patient's hypokalemia is stable

## 2025-07-17 NOTE — SUBJECTIVE & OBJECTIVE
Interval History: May get urology top comment on Renal mass. T max 99.1    Review of Systems  Objective:     Vital Signs (Most Recent):  Temp: 99.1 °F (37.3 °C) (07/17/25 0834)  Pulse: 73 (07/17/25 0834)  Resp: (!) 22 (07/17/25 0834)  BP: (!) 193/86 (07/17/25 0834)  SpO2: 98 % (07/17/25 0834) Vital Signs (24h Range):  Temp:  [97.4 °F (36.3 °C)-99.1 °F (37.3 °C)] 99.1 °F (37.3 °C)  Pulse:  [] 73  Resp:  [12-26] 22  SpO2:  [95 %-99 %] 98 %  BP: (166-214)/() 193/86     Weight: 87 kg (191 lb 12.8 oz)  Body mass index is 25.3 kg/m².    Intake/Output Summary (Last 24 hours) at 7/17/2025 0958  Last data filed at 7/17/2025 0431  Gross per 24 hour   Intake 290 ml   Output 995 ml   Net -705 ml         Physical Exam  Constitutional:       Appearance: Normal appearance.   HENT:      Head: Normocephalic and atraumatic.      Nose: Nose normal.      Mouth/Throat:      Mouth: Mucous membranes are moist.   Eyes:      Extraocular Movements: Extraocular movements intact.      Pupils: Pupils are equal, round, and reactive to light.   Cardiovascular:      Rate and Rhythm: Normal rate and regular rhythm.      Heart sounds: No murmur heard.     No gallop.   Pulmonary:      Effort: Pulmonary effort is normal.      Breath sounds: Normal breath sounds. No wheezing or rales.   Abdominal:      General: Abdomen is flat. Bowel sounds are normal. There is no distension.      Palpations: Abdomen is soft.      Tenderness: There is no abdominal tenderness.   Musculoskeletal:         General: No swelling or tenderness. Normal range of motion.      Cervical back: Normal range of motion and neck supple.      Right lower leg: Edema present.      Left lower leg: Edema present.      Comments: Edema improving   Skin:     General: Skin is warm and dry.      Capillary Refill: Capillary refill takes less than 2 seconds.   Neurological:      General: No focal deficit present.      Mental Status: He is alert. Mental status is at baseline.    Psychiatric:         Mood and Affect: Mood normal.               Significant Labs: All pertinent labs within the past 24 hours have been reviewed.  BMP:   Recent Labs   Lab 07/17/25  0508   *   *   K 4.8      CO2 19*   BUN 63*   CREATININE 3.4*   CALCIUM 8.7   MG 1.8       Significant Imaging: I have reviewed all pertinent imaging results/findings within the past 24 hours.

## 2025-07-17 NOTE — ASSESSMENT & PLAN NOTE
The patients 3 most recent labs are listed below.  Recent Labs     07/15/25  0449 07/16/25  0251 07/17/25  0508   * 152 167     Plan  - Will transfuse if platelet count is <10k.  - daily CBC

## 2025-07-17 NOTE — PROGRESS NOTES
"[facility]  Endocrinology  Progress Note    Admit Date: 2025     Reason for Consult: Management of T2DM, Hyperglycemia      Diabetes diagnosis year:       Home Diabetes Medications:    OMNIPOD 5  0.6 u/hr mn-0600 , 9p-mn, 0.7 u/hr 6a-9p   Target 120-130 mg/dl   Iob 3 hours  Max bolus 20 units   Max basal 2 u/hr   Isf 40 mn-mn  ICR 1 to 7.5      Presets:  Snack: 4 units (28g)  Small Meal: 8 units (56g)  Medium Meal: 12 units (90g)  Large Meal: 15 units (114g)  Super Large Meal: 18 units (130g)     How often checking glucose at home? >4 x day   BG readings on regimen: 150s  Hypoglycemia on the regimen?  No  Missed doses on regimen?  No     Diabetes Complications include:     Hyperglycemia     Complicating diabetes co morbidities:   S/p Kidney tx          HPI:Ravi Zamorano is a 69 y.o. male s/p kidney transplant in 2016 on tacro, CKD 4, HFpEF, T2DM with insulin pump use, afib on xarelto and AICD being admitted to hospital medicine for manangement of PHUONG on CKD and CHF exacerbation. Patient reports increased feelings of lightheadedness/dizziness with leaning forward the past several days. Endorses associated unsteady gait and bilateral "hand twitching". In the ED hypertensive to 230/98, febrile to Tmax 101.4 F. Endocrine consulted to manage hyperglycemia, type 2 diabetes, and insulin pump therapy  in the context of the inpatient setting.     Lab Results   Component Value Date    HGBA1C 6.5 (H) 2025           Interval HPI:   No acute events overnight. Patient in room 37867/09854 A. Blood glucose stable. BG at goal on current insulin regimen (Home insulin pump). Steroid use- Prednisone 5 mg daily.    Renal function-  Lab Results   Component Value Date    CREATININE 3.4 (H) 2025          Vasopressors-  None      Endocrine will continue to follow and manage insulin orders inpatient.      Diet Low Sodium (2 gm) Fluid - 1500mL      Eatin%  Nausea: No  Hypoglycemia and " "intervention: No  Fever: No  TPN and/or TF: No  If yes, type of TF/TPN and rate: N/A    BP (!) 193/86   Pulse 73   Temp 99.1 °F (37.3 °C) (Oral)   Resp (!) 22   Ht 6' 1" (1.854 m)   Wt 87 kg (191 lb 12.8 oz)   SpO2 98%   BMI 25.30 kg/m²     Labs Reviewed and Include    Recent Labs   Lab 07/17/25  0508   *   CALCIUM 8.7   *   K 4.8   CO2 19*      BUN 63*   CREATININE 3.4*     Lab Results   Component Value Date    WBC 7.24 07/17/2025    HGB 9.4 (L) 07/17/2025    HCT 31.0 (L) 07/17/2025    MCV 71 (L) 07/17/2025     07/17/2025     Recent Labs   Lab 07/12/25 2021   TSH 1.684     Lab Results   Component Value Date    HGBA1C 6.5 (H) 07/13/2025       Nutritional status:   Body mass index is 25.3 kg/m².  Lab Results   Component Value Date    ALBUMIN 3.1 (L) 07/12/2025    ALBUMIN 3.4 (L) 07/07/2025    ALBUMIN 3.1 (L) 06/27/2025     No results found for: "PREALBUMIN"    Estimated Creatinine Clearance: 23.8 mL/min (A) (based on SCr of 3.4 mg/dL (H)).    Accu-Checks  Recent Labs     07/15/25  1123 07/15/25  1652 07/15/25  2104 07/16/25  0211 07/16/25  0741 07/16/25  1138 07/16/25  1608 07/16/25  2058 07/17/25  0203 07/17/25  0838   POCTGLUCOSE 173* 181* 283* 142* 145* 186* 314* 249* 186* 152*       Current Medications and/or Treatments Impacting Glycemic Control  Immunotherapy:    Immunosuppressants           Stop Route Frequency     tacrolimus capsule 1 mg         -- Oral 2 times daily          Steroids:   Hormones (From admission, onward)      Start     Stop Route Frequency Ordered    07/13/25 0900  predniSONE tablet 5 mg         -- Oral Daily 07/13/25 0543    07/13/25 0138  melatonin tablet 6 mg         -- Oral Nightly PRN 07/13/25 0040          Pressors:    Autonomic Drugs (From admission, onward)      None          Hyperglycemia/Diabetes Medications:   Antihyperglycemics (From admission, onward)      Start     Stop Route Frequency Ordered    07/15/25 1630  insulin aspart U-100 insulin pump " from home        Question Answer Comment   Target number 110    Basal Rate #1 0.6    Basal rate #1 time 0000        -- SubQ Continuous 07/15/25 1521    07/15/25 1620  insulin aspart U-100 insulin pump from home 0-10 Units        Question Answer Comment   Target number 110    Carbohydrate coverage #1 7.5    Carbohydrate coverage #1 time 0000    Sensitivity #1 40    Sensitivity #1 time 0000        -- SubQ As needed (PRN) 07/15/25 1521            ASSESSMENT and PLAN    Cardiac/Vascular  * Acute on chronic diastolic congestive heart failure  Managed per primary team  Avoid hypoglycemia        Endocrine  Insulin pump in place  At time of evaluation, pt meets criteria to continue home insulin pump usage.  - Has all adequate supplies   - Bolus settings reviewed    - No changes to home regimen.   - Nurse to check BG qac/hs/0200 & record in epic   - Patient to input glucose into pump and use bolus wizard for prandial needs   - Will continue to monitor accuchecks and titrate insulin as clinically indicated .     - Discussed above plan with patient, patient verbalized understanding.   - Understands in case of pump malfunction or cognitive decline in which pt can no longer safely use insulin pump, will transition to [place] MDI        If pump malfunctions or is disconnected, please notify the on-call provider for endocrinology.         Type 2 diabetes mellitus with stage 4 chronic kidney disease, with long-term current use of insulin  Endocrinology consulted for BG management.   BG goal 140-180     - Home insulin pump   - BG checks AC/HS/0200  - Hypoglycemia protocol in place    ** Please notify Endocrine for any change and/or advance in diet**  ** Please call Endocrine for any BG related issues **    Discharge Planning:   TBD. Please notify endocrinology prior to discharge.            Malina Slaughter PA-C  Endocrinology  [facility]

## 2025-07-17 NOTE — PLAN OF CARE
Patient is alert and oriented, room air. Denies chest pain and shortness of breath. Has own insulin pump. Systolic Bp remains elevated, pt on hydralazine PO. Bp 209/98 provider notified, authorized early administration of scheduled hydralazine 100mg po since the patient is asymptomatic, BP was still high 212/94, isosorbide 120mg given orally    Care on going  Problem: Adult Inpatient Plan of Care  Goal: Plan of Care Review  Outcome: Progressing  Goal: Patient-Specific Goal (Individualized)  Outcome: Progressing  Goal: Absence of Hospital-Acquired Illness or Injury  Outcome: Progressing  Goal: Optimal Comfort and Wellbeing  Outcome: Progressing  Goal: Readiness for Transition of Care  Outcome: Progressing     Problem: Diabetes Comorbidity  Goal: Blood Glucose Level Within Targeted Range  Outcome: Progressing     Problem: Acute Kidney Injury/Impairment  Goal: Fluid and Electrolyte Balance  Outcome: Progressing  Goal: Improved Oral Intake  Outcome: Progressing  Goal: Effective Renal Function  Outcome: Progressing     Problem: Pneumonia  Goal: Fluid Balance  Outcome: Progressing  Goal: Resolution of Infection Signs and Symptoms  Outcome: Progressing  Goal: Effective Oxygenation and Ventilation  Outcome: Progressing     Problem: Fall Injury Risk  Goal: Absence of Fall and Fall-Related Injury  Outcome: Progressing     Problem: Dysrhythmia  Goal: Normalized Cardiac Rhythm  Outcome: Progressing     Problem: Heart Failure  Goal: Optimal Coping  Outcome: Progressing  Goal: Optimal Cardiac Output  Outcome: Progressing  Goal: Stable Heart Rate and Rhythm  Outcome: Progressing  Goal: Optimal Functional Ability  Outcome: Progressing  Goal: Fluid and Electrolyte Balance  Outcome: Progressing  Goal: Improved Oral Intake  Outcome: Progressing  Goal: Effective Oxygenation and Ventilation  Outcome: Progressing  Goal: Effective Breathing Pattern During Sleep  Outcome: Progressing     Problem: Self-Concept, Readiness for Enhanced  Goal:  Improved Self-Perception and Self-Esteem  Outcome: Progressing     Problem: Fatigue  Goal: Improved Activity Tolerance  Outcome: Progressing     Problem: Hypertension Acute  Goal: Blood Pressure Within Desired Range  Outcome: Progressing

## 2025-07-17 NOTE — SUBJECTIVE & OBJECTIVE
"Interval HPI:   No acute events overnight. Patient in room 06263/06768 A. Blood glucose stable. BG at goal on current insulin regimen (Home insulin pump). Steroid use- Prednisone 5 mg daily.    Renal function-  Lab Results   Component Value Date    CREATININE 3.4 (H) 2025          Vasopressors-  None      Endocrine will continue to follow and manage insulin orders inpatient.      Diet Low Sodium (2 gm) Fluid - 1500mL      Eatin%  Nausea: No  Hypoglycemia and intervention: No  Fever: No  TPN and/or TF: No  If yes, type of TF/TPN and rate: N/A    BP (!) 193/86   Pulse 73   Temp 99.1 °F (37.3 °C) (Oral)   Resp (!) 22   Ht 6' 1" (1.854 m)   Wt 87 kg (191 lb 12.8 oz)   SpO2 98%   BMI 25.30 kg/m²     Labs Reviewed and Include    Recent Labs   Lab 25  0508   *   CALCIUM 8.7   *   K 4.8   CO2 19*      BUN 63*   CREATININE 3.4*     Lab Results   Component Value Date    WBC 7.24 2025    HGB 9.4 (L) 2025    HCT 31.0 (L) 2025    MCV 71 (L) 2025     2025     Recent Labs   Lab 25   TSH 1.684     Lab Results   Component Value Date    HGBA1C 6.5 (H) 2025       Nutritional status:   Body mass index is 25.3 kg/m².  Lab Results   Component Value Date    ALBUMIN 3.1 (L) 2025    ALBUMIN 3.4 (L) 2025    ALBUMIN 3.1 (L) 2025     No results found for: "PREALBUMIN"    Estimated Creatinine Clearance: 23.8 mL/min (A) (based on SCr of 3.4 mg/dL (H)).    Accu-Checks  Recent Labs     07/15/25  1123 07/15/25  1652 07/15/25  2104 25  0211 25  0741 25  1138 25  1608 25  2058 25  0203 25  0838   POCTGLUCOSE 173* 181* 283* 142* 145* 186* 314* 249* 186* 152*       Current Medications and/or Treatments Impacting Glycemic Control  Immunotherapy:    Immunosuppressants           Stop Route Frequency     tacrolimus capsule 1 mg         -- Oral 2 times daily          Steroids:   Hormones (From " admission, onward)      Start     Stop Route Frequency Ordered    07/13/25 0900  predniSONE tablet 5 mg         -- Oral Daily 07/13/25 0543    07/13/25 0138  melatonin tablet 6 mg         -- Oral Nightly PRN 07/13/25 0040          Pressors:    Autonomic Drugs (From admission, onward)      None          Hyperglycemia/Diabetes Medications:   Antihyperglycemics (From admission, onward)      Start     Stop Route Frequency Ordered    07/15/25 1630  insulin aspart U-100 insulin pump from home        Question Answer Comment   Target number 110    Basal Rate #1 0.6    Basal rate #1 time 0000        -- SubQ Continuous 07/15/25 1521    07/15/25 1620  insulin aspart U-100 insulin pump from home 0-10 Units        Question Answer Comment   Target number 110    Carbohydrate coverage #1 7.5    Carbohydrate coverage #1 time 0000    Sensitivity #1 40    Sensitivity #1 time 0000        -- SubQ As needed (PRN) 07/15/25 1521

## 2025-07-17 NOTE — ASSESSMENT & PLAN NOTE
-noted on CT findings   -continue Flagyl and Rocephin   -pulm consulted per ktm recs for recurrent PNA

## 2025-07-17 NOTE — PLAN OF CARE
POC updated and reviewed with the patient and spouse at bedside. Questions regarding POC were encouraged and addressed. VSS with improvement in hypertension from last night, see flowsheets. Tele maintained per provider's order. Patient is AO x 4 at this time. No acute events noted during shift. Pain/Nausea management using PRN medications. See MAR for medication administration details. Fall, and safety precautions maintained. No signs of injury noted. Upon exiting room, patient's bed locked in low position, side rails up x 2, bed alarm on, with call light within reach. Instructed patient to call staff for mobility, verbalized understanding.  No acute signs of distress noted at this time. Pt transitioned off insulin gtt 7/15 to home insulin pump, log at bedside and signed agreement in chart. T-max of 99.1 this shift, MD aware. Voiding spontaneously, BM 7/17.     Problem: Adult Inpatient Plan of Care  Goal: Plan of Care Review  Outcome: Progressing  Goal: Patient-Specific Goal (Individualized)  Outcome: Progressing  Goal: Absence of Hospital-Acquired Illness or Injury  Outcome: Progressing  Goal: Optimal Comfort and Wellbeing  Outcome: Progressing  Goal: Readiness for Transition of Care  Outcome: Progressing     Problem: Infection  Goal: Absence of Infection Signs and Symptoms  Outcome: Progressing     Problem: Diabetes Comorbidity  Goal: Blood Glucose Level Within Targeted Range  Outcome: Progressing     Problem: Acute Kidney Injury/Impairment  Goal: Fluid and Electrolyte Balance  Outcome: Progressing  Goal: Improved Oral Intake  Outcome: Progressing  Goal: Effective Renal Function  Outcome: Progressing     Problem: Pneumonia  Goal: Fluid Balance  Outcome: Progressing  Goal: Resolution of Infection Signs and Symptoms  Outcome: Progressing  Goal: Effective Oxygenation and Ventilation  Outcome: Progressing     Problem: Fall Injury Risk  Goal: Absence of Fall and Fall-Related Injury  Outcome: Progressing     Problem:  Dysrhythmia  Goal: Normalized Cardiac Rhythm  Outcome: Progressing     Problem: Heart Failure  Goal: Optimal Coping  Outcome: Progressing  Goal: Optimal Cardiac Output  Outcome: Progressing  Goal: Stable Heart Rate and Rhythm  Outcome: Progressing  Goal: Optimal Functional Ability  Outcome: Progressing  Goal: Fluid and Electrolyte Balance  Outcome: Progressing  Goal: Improved Oral Intake  Outcome: Progressing  Goal: Effective Oxygenation and Ventilation  Outcome: Progressing  Goal: Effective Breathing Pattern During Sleep  Outcome: Progressing     Problem: Self-Concept, Readiness for Enhanced  Goal: Improved Self-Perception and Self-Esteem  Outcome: Progressing     Problem: Fatigue  Goal: Improved Activity Tolerance  Outcome: Progressing     Problem: Hypertension Acute  Goal: Blood Pressure Within Desired Range  Outcome: Progressing

## 2025-07-17 NOTE — PROGRESS NOTES
"[facility] Medicine  Progress Note    Patient Name: Ravi Zamorano  MRN: [id number]  Patient Class: IP- Inpatient   Admission Date: 7/12/2025  Length of Stay: 4 days  Attending Physician: Hiram Mcgrath MD  Primary Care Provider: Mima Mack MD        Subjective     Principal Problem:Acute on chronic diastolic congestive heart failure        HPI:  Ravi Zamorano is a 69 y.o. male s/p kidney transplant in 2016 on tacro, CKD 4, HFpEF, T2DM with insulin pump use, afib on xarelto and AICD being admitted to hospital medicine for manangement of PHUONG on CKD and CHF exacerbation.  Patient reports increased feelings of lightheadedness/dizziness with leaning forward the past several days. Endorses associated unsteady gait and bilateral "hand twitching". During the encounter he seems to have a difficult time explaining his symptoms or their duration. Denies any recent illness, cough, congestion, N/V/D, abdominal pain, dysuria or hematuria. Febrile to 101.4 F on admission, when asked if having recent fevers he says he often has fevers to 101 F at home but is unable to tell me the most recent time this has happened. Sates he does not know what his current home medication regimen is.       In ED: hypertensive to 230/98, febrile to Tmax 101.4 F, remaining vitals stable. CBC without leukocytosis, hgb 8.5 which is baseline. CMP notable for PHUONG on CKD (creatinine 3.9, most recent baseline ~3.1). BNP elevated to 4,888 (elevated from most recent 3,164 6 days ago). Lactic acid and procal wnl. UA non infectious. CT head without acute intracranial abnormality. CXR with cardiomegaly with interstitial edema. Given 1 0 mg IV hydralazine x1, 20 mg IV hydralazine x1 and 80 mg IV lasix in the ED. Admitted to hospital medicine for further management.     Overview/Hospital Course:  While on the floor patient remained hypertensive. KTM and ID consulted as well as Nephrology. Continued on IV diuresis " for concerns of Cardiorenal with improvement in Cr. On IV antibiotics per ID recs with Cefepime and Flagyl. CT C/A/P notable for aspiration PNA as well as concerns for renal mass. Dedicated Renal US ordered and notable for new renal lesion on the left kidney concerning for renal cell carcinoma. Urology recommending outpatient follow up for Uro Onc clinic. KTM recommending Pulm consult for recurrent PNA. Esophagram ordered given history of recurrent PNA. Cr improved and diuresis decreased to PO. Hypertensive throughout hospitalization. ARB restarted since Cr stable. Had concerns of pauses on tele and AV block. Discussed with EP. Hold BB at discharge. No third deg av block on EKG or seen when reviewing tele with EP. Had fever on 7/16, discussed with ID. Will continue to monitor now and can consider re consulting if fever persist.            Interval History: . T max 101.5- due to renal mass? Discussed with urology - needs out patient f/u.    Review of Systems  Objective:     Vital Signs (Most Recent):  Temp: 99.1 °F (37.3 °C) (07/17/25 0834)  Pulse: 73 (07/17/25 0834)  Resp: (!) 22 (07/17/25 0834)  BP: (!) 193/86 (07/17/25 0834)  SpO2: 98 % (07/17/25 0834) Vital Signs (24h Range):  Temp:  [97.4 °F (36.3 °C)-99.1 °F (37.3 °C)] 99.1 °F (37.3 °C)  Pulse:  [] 73  Resp:  [12-26] 22  SpO2:  [95 %-99 %] 98 %  BP: (166-214)/() 193/86     Weight: 87 kg (191 lb 12.8 oz)  Body mass index is 25.3 kg/m².    Intake/Output Summary (Last 24 hours) at 7/17/2025 0949  Last data filed at 7/17/2025 0431  Gross per 24 hour   Intake 290 ml   Output 995 ml   Net -705 ml         Physical Exam  Constitutional:       Appearance: Normal appearance.   HENT:      Head: Normocephalic and atraumatic.      Nose: Nose normal.      Mouth/Throat:      Mouth: Mucous membranes are moist.   Eyes:      Extraocular Movements: Extraocular movements intact.      Pupils: Pupils are equal, round, and reactive to light.   Cardiovascular:      Rate  and Rhythm: Normal rate and regular rhythm.      Heart sounds: No murmur heard.     No gallop.   Pulmonary:      Effort: Pulmonary effort is normal.      Breath sounds: Normal breath sounds. No wheezing or rales.   Abdominal:      General: Abdomen is flat. Bowel sounds are normal. There is no distension.      Palpations: Abdomen is soft.      Tenderness: There is no abdominal tenderness.   Musculoskeletal:         General: No swelling or tenderness. Normal range of motion.      Cervical back: Normal range of motion and neck supple.      Right lower leg: Edema present.      Left lower leg: Edema present.      Comments: Edema improving   Skin:     General: Skin is warm and dry.      Capillary Refill: Capillary refill takes less than 2 seconds.   Neurological:      General: No focal deficit present.      Mental Status: He is alert. Mental status is at baseline.   Psychiatric:         Mood and Affect: Mood normal.               Significant Labs: All pertinent labs within the past 24 hours have been reviewed.  BMP:   Recent Labs   Lab 07/17/25  0508   *   *   K 4.8      CO2 19*   BUN 63*   CREATININE 3.4*   CALCIUM 8.7   MG 1.8       Significant Imaging: I have reviewed all pertinent imaging results/findings within the past 24 hours.      Assessment & Plan  Acute on chronic diastolic congestive heart failure   - CHF pathway initiated   - CXR revealed Cardiomegaly with interstitial edema,   - BNP 4,888 / troponin 205>>220  - EKG with atrial fibrillation recurrence (recent ablation 04/25)  - last echo showing ejection fraction of 50 - 55%.   - repeat TTE ordered with 50-55% and grade III diastolic dysfunction. Elevated pulmonary artery pressure   -  de escalate to PO lasix 60 mg BID   - strict Is/Os   - cardiac, low sodium diet  - fluid restrict 1.5L while inpatient  - daily CMP   - monitor on cardiac telemetry   - supplemental O2 as needed   Community acquired bacterial pneumonia  Fever   Unclear  etiology, patient without symptoms to guide possible cause  - febrile to 101.4F on admission  - CBC without leukocytosis  - CT head without acute process  -CT C/A/P concerning for asp vs multifocal PNA and renal mass    -possible component of malignancy causing fevers   -will continue Doxycycline and Augmentin   -no further episodes of fever   -Pulm consulted for recurrent asp PNA. Esophagram ordered.   -Cleared by ST without swallowing issues   -another fever 7/16-discussed with ID. Will continue to monitor at this time. (Possibly multifactorial given concern of malignancy)      Hypertensive emergency  Patient has a current diagnosis of Hypertensive emergency with end organ damage evidenced by acute kidney injury which is controlled.  Latest blood pressure and vitals reviewed-   Temp:  [97.4 °F (36.3 °C)-99.1 °F (37.3 °C)]   Pulse:  []   Resp:  [12-26]   BP: (166-214)/()   SpO2:  [95 %-99 %] .   Patient currently off IV antihypertensives.   Home meds for hypertension were reviewed and noted below.   holding BB due to pauses on tele    -cannot tolerate CCB   -will increase doxazosin and start hydralazine 100 mg TID and Imdur 60, will increase   -restart ARB since PHUONG is improving     Renal mass  -noted on CT A/P and on dedicated Retroperitoneal US  -discussed with Urology. Will arrange outpatient Uro Onc clinic follow up     Acute kidney injury superimposed on stage 4 chronic kidney disease  CKD IV (severe)  Suspect etiology cardiorenal.    - Cr 3.9.  Baseline Cr 3.1   -CR close to baseline   - UA unremarkable   - renal transplant US ordered. Renal US with renal mass   - nephrology consulted, appreciate recommendations   - Monitor UOP and follow serial BMP   - Adjust drugs to GFR/CrCL; avoid nephrotoxic drugs   -  Estimated Creatinine Clearance: 23.8 mL/min (A) (based on SCr of 3.4 mg/dL (H)).   - Monitor electrolytes, phos levels daily  -improving with diuresis and BP control   Type 2 diabetes mellitus  with stage 4 chronic kidney disease, with long-term current use of insulin  Insulin pump in place  - Patient's FSGs are controlled on current hypoglycemics.   - Last A1c reviewed- 6.5  Maintain anti-hyperglycemic dose as follows-   - endocrine consulted for insulin pump management. On home insulin pump     Hyperlipidemia associated with type 2 diabetes mellitus  - continue statin  S/P cadaveric kidney transplant 11/5/2016. ESRD secondary to HTN/DMII  Immunocompromised state due to drug therapy  Long-term use of immunosuppressant medication  - continue home tacro 1 mg BID  - KTM consulted  - daily BMP, daily tacro levels   Paroxysmal atrial fibrillation  Long term (current) use of anticoagulants  Patient has paroxysmal (<7 days) atrial fibrillation. Patient is currently in atrial fibrillation. [id number] Score: 3. The patients heart rate in the last 24 hours is as follows:  Pulse  Min: 62  Max: 102  Antiarrhythmics     Anticoagulants  rivaroxaban tablet 15 mg, With dinner, Oral    Plan  - Replete lytes with a goal of K>4, Mg >2  - Patient is anticoagulated, see medications listed above.  - Patient's afib is currently controlled  - hold bb given concerns of latha cardia and pauses on tele. Will follow up with cardiology outpatient.   Thrombocytopenia, unspecified  The patients 3 most recent labs are listed below.  Recent Labs     07/15/25  0449 07/16/25  0251 07/17/25  0508   * 152 167     Plan  - Will transfuse if platelet count is <10k.  - daily CBC   Anemia of chronic disease  Anemia is likely due to chronic disease due to Chronic Kidney Disease. Most recent hemoglobin and hematocrit are listed below.  Recent Labs     07/15/25  0449 07/16/25  0251 07/17/25  0508   HGB 9.2* 9.0* 9.4*   HCT 29.8* 29.1* 31.0*     Plan  - Monitor serial CBC: Daily  - Transfuse PRBC if patient becomes hemodynamically unstable, symptomatic or H/H drops below 7/21.  - Patient has not received any PRBC transfusions to date  -  Patient's anemia is currently stable  History of CVA (cerebrovascular accident)  - noted  - continue home statin and asa   Hypokalemia  Patient's most recent potassium results are listed below.   Recent Labs     07/15/25  0449 07/16/25  0251 07/17/25  0508   K 4.1 3.4* 4.8     Plan  - Replete potassium per protocol  - Monitor potassium Daily  - Patient's hypokalemia is stable  Hypertension associated with stage 4 chronic kidney disease due to type 2 diabetes mellitus  Uncontrolled on admission  - as above   - vitals q4h   PVD (peripheral vascular disease)  -ABIs ordered  -pulses on doppler found. No acute concerns     Hypercoagulable state due to paroxysmal atrial fibrillation  Patient has paroxysmal (<7 days) atrial fibrillation. Patient is currently in atrial fibrillation. [id number] Score: 3. The patients heart rate in the last 24 hours is as follows:  Pulse  Min: 62  Max: 102     Antiarrhythmics       Anticoagulants  rivaroxaban tablet 15 mg, With dinner, Oral    Plan  - Replete lytes with a goal of K>4, Mg >2  -   - Patient's afib is currently controlled        Pleural effusion      Pneumonia  -noted on CT findings   -continue Flagyl and Rocephin   -pulm consulted per ktm recs for recurrent PNA     VTE Risk Mitigation (From admission, onward)           Ordered     rivaroxaban tablet 15 mg  With dinner         07/13/25 0543     Reason for No Pharmacological VTE Prophylaxis  Once        Question:  Reasons:  Answer:  Already adequately anticoagulated on oral Anticoagulants    07/13/25 0040     IP VTE HIGH RISK PATIENT  Once         07/13/25 0040     Place sequential compression device  Until discontinued         07/13/25 0040                    Discharge Planning   UBALDO: 7/18/2025     Code Status: Full Code   Medical Readiness for Discharge Date:   Discharge Plan A: Home with family                        Hiram Mcgrath MD  Department of Hospital Medicine   [facility]

## 2025-07-17 NOTE — ASSESSMENT & PLAN NOTE
Patient has paroxysmal (<7 days) atrial fibrillation. Patient is currently in atrial fibrillation. [id number] Score: 3. The patients heart rate in the last 24 hours is as follows:  Pulse  Min: 62  Max: 102     Antiarrhythmics       Anticoagulants  rivaroxaban tablet 15 mg, With dinner, Oral    Plan  - Replete lytes with a goal of K>4, Mg >2  -   - Patient's afib is currently controlled

## 2025-07-17 NOTE — PLAN OF CARE
POC updated and reviewed with the patient and spouse at bedside. Questions regarding POC were encouraged and addressed. VSS with mild improvement in bradycardia and hypertension from last night, see flowsheets. Tele maintained per provider's order. Patient is AO x 4 at this time. No acute events noted during shift. Pain/Nausea management using PRN medications. See MAR for medication administration details. Fall, and safety precautions maintained. No signs of injury noted. Upon exiting room, patient's bed locked in low position, side rails up x 2, bed alarm on, with call light within reach. Instructed patient to call staff for mobility, verbalized understanding.  No acute signs of distress noted at this time. Pt transitioned off insulin gtt 7/15 to home insulin pump, log at bedside and signed agreement in chart. Esophogram completed today. Pt hypertensive and in first degree AV block in AM as well as endorsing T-max of 101.5, team notified of all events.     Problem: Adult Inpatient Plan of Care  Goal: Plan of Care Review  Outcome: Progressing  Goal: Patient-Specific Goal (Individualized)  Outcome: Progressing  Goal: Absence of Hospital-Acquired Illness or Injury  Outcome: Progressing  Goal: Optimal Comfort and Wellbeing  Outcome: Progressing  Goal: Readiness for Transition of Care  Outcome: Progressing     Problem: Infection  Goal: Absence of Infection Signs and Symptoms  Outcome: Progressing     Problem: Diabetes Comorbidity  Goal: Blood Glucose Level Within Targeted Range  Outcome: Progressing     Problem: Acute Kidney Injury/Impairment  Goal: Fluid and Electrolyte Balance  Outcome: Progressing  Goal: Improved Oral Intake  Outcome: Progressing  Goal: Effective Renal Function  Outcome: Progressing     Problem: Pneumonia  Goal: Fluid Balance  Outcome: Progressing  Goal: Resolution of Infection Signs and Symptoms  Outcome: Progressing  Goal: Effective Oxygenation and Ventilation  Outcome: Progressing     Problem:  Fall Injury Risk  Goal: Absence of Fall and Fall-Related Injury  Outcome: Progressing     Problem: Dysrhythmia  Goal: Normalized Cardiac Rhythm  Outcome: Progressing     Problem: Heart Failure  Goal: Optimal Coping  Outcome: Progressing  Goal: Optimal Cardiac Output  Outcome: Progressing  Goal: Stable Heart Rate and Rhythm  Outcome: Progressing  Goal: Optimal Functional Ability  Outcome: Progressing  Goal: Fluid and Electrolyte Balance  Outcome: Progressing  Goal: Improved Oral Intake  Outcome: Progressing  Goal: Effective Oxygenation and Ventilation  Outcome: Progressing  Goal: Effective Breathing Pattern During Sleep  Outcome: Progressing

## 2025-07-18 VITALS
HEIGHT: 73 IN | RESPIRATION RATE: 20 BRPM | TEMPERATURE: 99 F | DIASTOLIC BLOOD PRESSURE: 69 MMHG | HEART RATE: 89 BPM | OXYGEN SATURATION: 98 % | BODY MASS INDEX: 25.27 KG/M2 | WEIGHT: 190.69 LBS | SYSTOLIC BLOOD PRESSURE: 143 MMHG

## 2025-07-18 LAB
ABSOLUTE EOSINOPHIL (OHS): 0.48 K/UL
ABSOLUTE MONOCYTE (OHS): 1.37 K/UL (ref 0.3–1)
ABSOLUTE NEUTROPHIL COUNT (OHS): 4.78 K/UL (ref 1.8–7.7)
ANION GAP (OHS): 12 MMOL/L (ref 8–16)
BACTERIA BLD CULT: NORMAL
BACTERIA BLD CULT: NORMAL
BASOPHILS # BLD AUTO: 0.04 K/UL
BASOPHILS NFR BLD AUTO: 0.5 %
BUN SERPL-MCNC: 62 MG/DL (ref 8–23)
CALCIUM SERPL-MCNC: 8.6 MG/DL (ref 8.7–10.5)
CHLORIDE SERPL-SCNC: 104 MMOL/L (ref 95–110)
CO2 SERPL-SCNC: 19 MMOL/L (ref 23–29)
CREAT SERPL-MCNC: 3.4 MG/DL (ref 0.5–1.4)
ERYTHROCYTE [DISTWIDTH] IN BLOOD BY AUTOMATED COUNT: 18.8 % (ref 11.5–14.5)
GFR SERPLBLD CREATININE-BSD FMLA CKD-EPI: 19 ML/MIN/1.73/M2
GLUCOSE SERPL-MCNC: 131 MG/DL (ref 70–110)
HCT VFR BLD AUTO: 29.5 % (ref 40–54)
HGB BLD-MCNC: 9.1 GM/DL (ref 14–18)
IMM GRANULOCYTES # BLD AUTO: 0.04 K/UL (ref 0–0.04)
IMM GRANULOCYTES NFR BLD AUTO: 0.5 % (ref 0–0.5)
LYMPHOCYTES # BLD AUTO: 1.58 K/UL (ref 1–4.8)
MAGNESIUM SERPL-MCNC: 1.6 MG/DL (ref 1.6–2.6)
MCH RBC QN AUTO: 21.4 PG (ref 27–31)
MCHC RBC AUTO-ENTMCNC: 30.8 G/DL (ref 32–36)
MCV RBC AUTO: 69 FL (ref 82–98)
NUCLEATED RBC (/100WBC) (OHS): 0 /100 WBC
PHOSPHATE SERPL-MCNC: 3.4 MG/DL (ref 2.7–4.5)
PLATELET # BLD AUTO: 176 K/UL (ref 150–450)
PMV BLD AUTO: ABNORMAL FL
POCT GLUCOSE: 152 MG/DL (ref 70–110)
POTASSIUM SERPL-SCNC: 3.7 MMOL/L (ref 3.5–5.1)
RBC # BLD AUTO: 4.25 M/UL (ref 4.6–6.2)
RELATIVE EOSINOPHIL (OHS): 5.8 %
RELATIVE LYMPHOCYTE (OHS): 19.1 % (ref 18–48)
RELATIVE MONOCYTE (OHS): 16.5 % (ref 4–15)
RELATIVE NEUTROPHIL (OHS): 57.6 % (ref 38–73)
SODIUM SERPL-SCNC: 135 MMOL/L (ref 136–145)
TACROLIMUS BLD-MCNC: 7.3 NG/ML (ref 5–15)
WBC # BLD AUTO: 8.29 K/UL (ref 3.9–12.7)

## 2025-07-18 PROCEDURE — 84100 ASSAY OF PHOSPHORUS: CPT | Mod: HCNC

## 2025-07-18 PROCEDURE — 63600175 PHARM REV CODE 636 W HCPCS: Mod: HCNC

## 2025-07-18 PROCEDURE — 84132 ASSAY OF SERUM POTASSIUM: CPT | Mod: HCNC

## 2025-07-18 PROCEDURE — 63600175 PHARM REV CODE 636 W HCPCS: Mod: HCNC | Performed by: STUDENT IN AN ORGANIZED HEALTH CARE EDUCATION/TRAINING PROGRAM

## 2025-07-18 PROCEDURE — 36415 COLL VENOUS BLD VENIPUNCTURE: CPT | Mod: HCNC

## 2025-07-18 PROCEDURE — 25000003 PHARM REV CODE 250: Mod: HCNC | Performed by: STUDENT IN AN ORGANIZED HEALTH CARE EDUCATION/TRAINING PROGRAM

## 2025-07-18 PROCEDURE — 25000003 PHARM REV CODE 250: Mod: HCNC

## 2025-07-18 PROCEDURE — 83735 ASSAY OF MAGNESIUM: CPT | Mod: HCNC

## 2025-07-18 PROCEDURE — 80197 ASSAY OF TACROLIMUS: CPT | Mod: HCNC

## 2025-07-18 PROCEDURE — 85025 COMPLETE CBC W/AUTO DIFF WBC: CPT | Mod: HCNC

## 2025-07-18 RX ORDER — HYDRALAZINE HYDROCHLORIDE 20 MG/ML
10 INJECTION INTRAMUSCULAR; INTRAVENOUS ONCE
Status: COMPLETED | OUTPATIENT
Start: 2025-07-18 | End: 2025-07-18

## 2025-07-18 RX ORDER — LEVOFLOXACIN 500 MG/1
500 TABLET, FILM COATED ORAL DAILY
Qty: 2 TABLET | Refills: 0 | Status: SHIPPED | OUTPATIENT
Start: 2025-07-18 | End: 2025-07-20

## 2025-07-18 RX ADMIN — PREDNISONE 5 MG: 5 TABLET ORAL at 08:07

## 2025-07-18 RX ADMIN — ISOSORBIDE MONONITRATE 120 MG: 30 TABLET, EXTENDED RELEASE ORAL at 08:07

## 2025-07-18 RX ADMIN — ASPIRIN 81 MG CHEWABLE TABLET 81 MG: 81 TABLET CHEWABLE at 08:07

## 2025-07-18 RX ADMIN — DOXYCYCLINE HYCLATE 100 MG: 100 TABLET, COATED ORAL at 08:07

## 2025-07-18 RX ADMIN — HYDRALAZINE HYDROCHLORIDE 100 MG: 50 TABLET ORAL at 06:07

## 2025-07-18 RX ADMIN — FERROUS SULFATE TAB EC 325 MG (65 MG FE EQUIVALENT) 1 EACH: 325 (65 FE) TABLET DELAYED RESPONSE at 08:07

## 2025-07-18 RX ADMIN — FUROSEMIDE 60 MG: 40 TABLET ORAL at 08:07

## 2025-07-18 RX ADMIN — HYDRALAZINE HYDROCHLORIDE 10 MG: 20 INJECTION, SOLUTION INTRAMUSCULAR; INTRAVENOUS at 12:07

## 2025-07-18 RX ADMIN — ACETAMINOPHEN 650 MG: 325 TABLET ORAL at 08:07

## 2025-07-18 RX ADMIN — DOXAZOSIN 16 MG: 8 TABLET ORAL at 08:07

## 2025-07-18 RX ADMIN — TACROLIMUS 1 MG: 1 CAPSULE ORAL at 08:07

## 2025-07-18 RX ADMIN — ATORVASTATIN CALCIUM 80 MG: 40 TABLET, FILM COATED ORAL at 08:07

## 2025-07-18 RX ADMIN — AMOXICILLIN AND CLAVULANATE POTASSIUM 500 MG: 500; 125 TABLET, FILM COATED ORAL at 08:07

## 2025-07-18 RX ADMIN — VALSARTAN 160 MG: 40 TABLET, FILM COATED ORAL at 08:07

## 2025-07-18 NOTE — PLAN OF CARE
07/18/25 1128   Medicare Message   Important Message from Medicare regarding Discharge Appeal Rights Given to patient/caregiver;Explained to patient/caregiver;Signed/date by patient/caregiver   Date IMM was signed 07/18/25   Time IMM was signed 1100     IMM explained.  Pt verbalized understanding.    Lashawn Ta RN, BSN  Case Management  [phone]

## 2025-07-18 NOTE — CARE UPDATE
KTM chart check note    Intake/Output Summary (Last 24 hours) at 7/18/2025 0722  Last data filed at 7/18/2025 0614  Gross per 24 hour   Intake 850 ml   Output 1160 ml   Net -310 ml       Vitals:    07/18/25 0134 07/18/25 0216 07/18/25 0355 07/18/25 0617   BP: (!) 191/80 (!) 194/81  (!) 185/87   Pulse: 85 86  85   Resp: (!) 21 18 14   Temp:       TempSrc:       SpO2:       Weight:   86.5 kg (190 lb 11.2 oz)    Height:           Recent Labs   Lab 07/16/25  0251 07/17/25  0508 07/18/25  0552    133* 135*   K 3.4* 4.8 3.7    103 104   CO2 20* 19* 19*   BUN 64* 63* 62*   CREATININE 3.4* 3.4* 3.4*   CALCIUM 8.5* 8.7 8.6*   PHOS 3.1 3.3 3.4     Labs stable. Cr unchanged. Daily prograf level. Resume MMF when abx done. No new recommendations at this time, will continue to observe.    No other related issues identified. Please call Nephrology as needed; We will continue to follow.

## 2025-07-18 NOTE — PLAN OF CARE
Patient is alert and oriented, room air. Denies chest pain and shortness of breath. Has own insulin pump. Bp elevated to 200s, IV hydralazine ordered and given. Latest BP this morning 185/87  Care on going  Problem: Adult Inpatient Plan of Care  Goal: Plan of Care Review  Outcome: Progressing  Goal: Patient-Specific Goal (Individualized)  Outcome: Progressing  Goal: Absence of Hospital-Acquired Illness or Injury  Outcome: Progressing  Goal: Optimal Comfort and Wellbeing  Outcome: Progressing  Goal: Readiness for Transition of Care  Outcome: Progressing     Problem: Infection  Goal: Absence of Infection Signs and Symptoms  Outcome: Progressing     Problem: Diabetes Comorbidity  Goal: Blood Glucose Level Within Targeted Range  Outcome: Progressing     Problem: Acute Kidney Injury/Impairment  Goal: Fluid and Electrolyte Balance  Outcome: Progressing  Goal: Improved Oral Intake  Outcome: Progressing  Goal: Effective Renal Function  Outcome: Progressing     Problem: Pneumonia  Goal: Fluid Balance  Outcome: Progressing  Goal: Resolution of Infection Signs and Symptoms  Outcome: Progressing  Goal: Effective Oxygenation and Ventilation  Outcome: Progressing     Problem: Fall Injury Risk  Goal: Absence of Fall and Fall-Related Injury  Outcome: Progressing     Problem: Dysrhythmia  Goal: Normalized Cardiac Rhythm  Outcome: Progressing     Problem: Heart Failure  Goal: Fluid and Electrolyte Balance  Outcome: Progressing  Goal: Improved Oral Intake  Outcome: Met  Goal: Effective Oxygenation and Ventilation  Outcome: Met  Goal: Effective Breathing Pattern During Sleep  Outcome: Met     Problem: Self-Concept, Readiness for Enhanced  Goal: Improved Self-Perception and Self-Esteem  Outcome: Met     Problem: Fatigue  Goal: Improved Activity Tolerance  Outcome: Progressing     Problem: Hypertension Acute  Goal: Blood Pressure Within Desired Range  Outcome: Progressing

## 2025-07-18 NOTE — CARE UPDATE
"RAPID RESPONSE NURSE CHART REVIEW        Chart Reviewed: 07/18/2025, 8:30 AM    MRN: [id number]  Bed: 05769/96950 A    Dx: Acute on chronic diastolic congestive heart failure    Ravi Zamorano has a past medical history of Anxiety, Arthritis, atrial fibrillation, Bilateral diabetic retinopathy, Cardioembolic stroke, CKD (chronic kidney disease), Colon polyps, Depression - situational, Diabetes type 2, Encounter for blood transfusion, History of hepatitis C, s/p successful Rx w/ SVR24 - 9/2017, Hyperlipidemia, Hypertension, Hyponatremia, Pancreatitis, S/P cadaveric kidney transplant 11/5/2016. ESRD secondary to HTN/DMII, and Stroke.    Last VS: BP (!) 217/86   Pulse 88   Temp 99 °F (37.2 °C) (Oral)   Resp (!) 22   Ht 6' 1" (1.854 m)   Wt 86.5 kg (190 lb 11.2 oz)   SpO2 98%   BMI 25.16 kg/m²     24H Vital Sign Range:  Temp:  [97.8 °F (36.6 °C)-99.1 °F (37.3 °C)]   Pulse:  [62-88]   Resp:  [10-22]   BP: (134-232)/(63-98)   SpO2:  [95 %-100 %]     Level of Consciousness (AVPU): alert    Recent Labs     07/16/25  0251 07/17/25  0508 07/18/25  0552   WBC 6.07 7.24 8.29   HGB 9.0* 9.4* 9.1*   HCT 29.1* 31.0* 29.5*    167 176       Recent Labs     07/16/25  0251 07/17/25  0508 07/18/25  0552    133* 135*   K 3.4* 4.8 3.7    103 104   CO2 20* 19* 19*   BUN 64* 63* 62*   CREATININE 3.4* 3.4* 3.4*   * 139* 131*   PHOS 3.1 3.3 3.4   MG 1.9 1.8 1.6       OXYGEN:             MEWS score: 0    Rounding completed at 0930.    Rounding completed with charge nurse Magda for Hypertension of 217/86  reports PRN given and daily med scheduled. No acute concerns verbalized at this time. Instructed to call [phone] for further concerns or assistance.    Chapin Choudhary RN      "

## 2025-07-18 NOTE — PLAN OF CARE
[facility] - Acute Medical Stepdown  Discharge Final Note    Primary Care Provider: Mima Mack MD    Expected Discharge Date: 7/18/2025    Patient discharged to home via private transportation.     Patient's bedside nurse  notified of the above.    Discharge Plan A and Plan B have been determined by review of patient's clinical status, future medical and therapeutic needs, and coverage/benefits for post-acute care in coordination with multidisciplinary team members.        Final Discharge Note (most recent)       Final Note - 07/18/25 1322          Final Note    Assessment Type Final Discharge Note (P)      Anticipated Discharge Disposition Home or Self Care (P)      What phone number can be called within the next 1-3 days to see how you are doing after discharge? [phone] (P)         Post-Acute Status    Discharge Delays None known at this time (P)                      Important Message from Medicare  Important Message from Medicare regarding Discharge Appeal Rights: Given to patient/caregiver, Explained to patient/caregiver, Signed/date by patient/caregiver     Date IMM was signed: 07/18/25  Time IMM was signed: 1100    Contact Info       Kenneth Provider   Specialty: Internal Medicine    [address]  [facility] [address]       Next Steps: Follow up            Future Appointments   Date Time Provider Department Center   7/21/2025 10:30 AM Sydnee Galicia NP [facility] NEPHRO [facility]   7/21/2025 11:15 AM EPO INJECTION SCHEDULE [facility] NEPHRO [facility]   7/22/2025  2:30 PM Mateo Diez MD [facility] UROLOG [facility]   7/24/2025 10:30 AM LAB, APPOINTMENT [facility] LAB [facility]   7/24/2025 11:00 AM Paris Lujan PA-C [facility] HRTC [facility]   7/24/2025 11:00 AM [facility] HEART FAILURE NURSE [facility] HRTC [facility]   7/29/2025  2:30 PM Devin Wood MD [facility] PULMSVC [facility]   7/31/2025  1:00 PM Aditya Kasper MD [facility] NEPHRO [facility]   8/5/2025  3:20 PM Gillies, Connor M, DO [facility]  CARDIO [facility]   9/16/2025 10:00 AM EKG, APPT [facility] EKG [facility]   9/16/2025 10:30 AM Mima Lyons NP [facility] ARRHYTH [facility]   9/30/2025  9:30 AM LAB, [facility] LAB [facility]   10/7/2025 11:00 AM Karan Lopez, APRN, FNP NOM IM [facility] PCW   12/17/2025 10:00 AM Argelia Bridges RD [facility] INTLDIA [facility]       Urology/Onco Appointment scheduled per CHW.      Lashawn Ta RN, BSN  Case Management  [phone]

## 2025-07-21 ENCOUNTER — TELEPHONE (OUTPATIENT)
Dept: INTERNAL MEDICINE | Facility: CLINIC | Age: 70
End: 2025-07-21
Payer: MEDICARE

## 2025-07-21 ENCOUNTER — OUTPATIENT CASE MANAGEMENT (OUTPATIENT)
Dept: ADMINISTRATIVE | Facility: OTHER | Age: 70
End: 2025-07-21
Payer: MEDICARE

## 2025-07-21 ENCOUNTER — CLINICAL SUPPORT (OUTPATIENT)
Dept: NEPHROLOGY | Facility: CLINIC | Age: 70
End: 2025-07-21
Payer: MEDICARE

## 2025-07-21 VITALS
HEART RATE: 80 BPM | OXYGEN SATURATION: 97 % | BODY MASS INDEX: 25.16 KG/M2 | WEIGHT: 190.69 LBS | SYSTOLIC BLOOD PRESSURE: 160 MMHG | DIASTOLIC BLOOD PRESSURE: 70 MMHG

## 2025-07-21 DIAGNOSIS — D63.8 ANEMIA OF CHRONIC DISEASE: Primary | ICD-10-CM

## 2025-07-21 PROCEDURE — 96372 THER/PROPH/DIAG INJ SC/IM: CPT | Mod: HCNC,S$GLB,,

## 2025-07-21 PROCEDURE — 99999 PR PBB SHADOW E&M-EST. PATIENT-LVL II: CPT | Mod: PBBFAC,HCNC,,

## 2025-07-21 RX ORDER — POTASSIUM CHLORIDE 20 MEQ/1
20 TABLET, EXTENDED RELEASE ORAL
COMMUNITY
Start: 2025-07-02

## 2025-07-21 NOTE — TELEPHONE ENCOUNTER
----- Message from  Urvashi sent at 7/21/2025 12:05 PM CDT -----  Regarding: HOFU needed  Contact: Urvashi Scales RN  Hello,     Patient was discharge Friday 7/18/2025 from the hospital and does not yet have a HOFU appointment scheduled. Can you please schedule him if his provider wants him to be seen and let him know appointment date and time?     Thank you,    Urvashi Scales, SHAGUFTA, RN  [facility] Case Management  [email or web address]  TEL:  [phone]

## 2025-07-21 NOTE — PROGRESS NOTES
Hgb 9.1 / Hct 29.5     Retacrit 4000 units .Per Form # DRORD-428. Tolerated well, no reactions noted. No concerns . Pt was given 2 week appt.      GFR 19

## 2025-07-22 ENCOUNTER — HOSPITAL ENCOUNTER (OUTPATIENT)
Facility: HOSPITAL | Age: 70
LOS: 1 days | Discharge: HOME | End: 2025-07-23
Attending: EMERGENCY MEDICINE | Admitting: INTERNAL MEDICINE
Payer: MEDICARE

## 2025-07-22 DIAGNOSIS — R53.1 GENERALIZED WEAKNESS: ICD-10-CM

## 2025-07-22 DIAGNOSIS — R50.9 FEVER, UNSPECIFIED FEVER CAUSE: Primary | ICD-10-CM

## 2025-07-22 DIAGNOSIS — R07.9 CHEST PAIN: ICD-10-CM

## 2025-07-22 DIAGNOSIS — E11.22 TYPE 2 DIABETES MELLITUS WITH STAGE 4 CHRONIC KIDNEY DISEASE, WITH LONG-TERM CURRENT USE OF INSULIN: ICD-10-CM

## 2025-07-22 DIAGNOSIS — D63.8 ANEMIA OF CHRONIC DISEASE: ICD-10-CM

## 2025-07-22 DIAGNOSIS — Z96.41 INSULIN PUMP IN PLACE: ICD-10-CM

## 2025-07-22 DIAGNOSIS — N18.4 TYPE 2 DIABETES MELLITUS WITH STAGE 4 CHRONIC KIDNEY DISEASE, WITH LONG-TERM CURRENT USE OF INSULIN: ICD-10-CM

## 2025-07-22 DIAGNOSIS — R50.9 FEBRILE ILLNESS: ICD-10-CM

## 2025-07-22 DIAGNOSIS — Z13.6 SCREENING FOR CARDIOVASCULAR CONDITION: ICD-10-CM

## 2025-07-22 DIAGNOSIS — Z79.4 TYPE 2 DIABETES MELLITUS WITH STAGE 4 CHRONIC KIDNEY DISEASE, WITH LONG-TERM CURRENT USE OF INSULIN: ICD-10-CM

## 2025-07-22 LAB
ABSOLUTE EOSINOPHIL (OHS): 0.29 K/UL
ABSOLUTE MONOCYTE (OHS): 0.87 K/UL (ref 0.3–1)
ABSOLUTE NEUTROPHIL COUNT (OHS): 6.31 K/UL (ref 1.8–7.7)
ALBUMIN SERPL BCP-MCNC: 2.6 G/DL (ref 3.5–5.2)
ALP SERPL-CCNC: 57 UNIT/L (ref 40–150)
ALT SERPL W/O P-5'-P-CCNC: 8 UNIT/L (ref 10–44)
ANION GAP (OHS): 10 MMOL/L (ref 8–16)
AST SERPL-CCNC: 20 UNIT/L (ref 11–45)
BACTERIA #/AREA URNS AUTO: ABNORMAL /HPF
BASOPHILS # BLD AUTO: 0.04 K/UL
BASOPHILS NFR BLD AUTO: 0.5 %
BILIRUB SERPL-MCNC: 0.3 MG/DL (ref 0.1–1)
BILIRUB UR QL STRIP.AUTO: NEGATIVE
BIPAP: 0
BNP SERPL-MCNC: 4520 PG/ML (ref 0–99)
BUN SERPL-MCNC: 70 MG/DL (ref 8–23)
CALCIUM SERPL-MCNC: 9.1 MG/DL (ref 8.7–10.5)
CHLORIDE SERPL-SCNC: 101 MMOL/L (ref 95–110)
CLARITY UR: CLEAR
CO2 SERPL-SCNC: 22 MMOL/L (ref 23–29)
COLOR UR AUTO: COLORLESS
CREAT SERPL-MCNC: 4 MG/DL (ref 0.5–1.4)
ERYTHROCYTE [DISTWIDTH] IN BLOOD BY AUTOMATED COUNT: 18.8 % (ref 11.5–14.5)
FIO2: 21 %
GFR SERPLBLD CREATININE-BSD FMLA CKD-EPI: 16 ML/MIN/1.73/M2
GLUCOSE SERPL-MCNC: 116 MG/DL (ref 70–110)
GLUCOSE UR QL STRIP: ABNORMAL
HCT VFR BLD AUTO: 27.6 % (ref 40–54)
HGB BLD-MCNC: 8.5 GM/DL (ref 14–18)
HGB UR QL STRIP: NEGATIVE
HYALINE CASTS UR QL AUTO: 3 /LPF (ref 0–1)
IMM GRANULOCYTES # BLD AUTO: 0.05 K/UL (ref 0–0.04)
IMM GRANULOCYTES NFR BLD AUTO: 0.6 % (ref 0–0.5)
INFLUENZA A MOLECULAR (OHS): NEGATIVE
INFLUENZA B MOLECULAR (OHS): NEGATIVE
KETONES UR QL STRIP: NEGATIVE
LDH SERPL L TO P-CCNC: 1.1 MMOL/L (ref 0.5–2.2)
LEUKOCYTE ESTERASE UR QL STRIP: NEGATIVE
LYMPHOCYTES # BLD AUTO: 0.68 K/UL (ref 1–4.8)
MAGNESIUM SERPL-MCNC: 1.7 MG/DL (ref 1.6–2.6)
MCH RBC QN AUTO: 21.2 PG (ref 27–31)
MCHC RBC AUTO-ENTMCNC: 30.8 G/DL (ref 32–36)
MCV RBC AUTO: 69 FL (ref 82–98)
MICROSCOPIC COMMENT: ABNORMAL
NITRITE UR QL STRIP: NEGATIVE
NUCLEATED RBC (/100WBC) (OHS): 0 /100 WBC
PH UR STRIP: 7 [PH]
PHOSPHATE SERPL-MCNC: 3.6 MG/DL (ref 2.7–4.5)
PLATELET # BLD AUTO: 214 K/UL (ref 150–450)
PMV BLD AUTO: ABNORMAL FL
POC PERFORMED BY: NORMAL
POC TEMPERATURE: 37 C
POCT GLUCOSE: 130 MG/DL (ref 70–110)
POTASSIUM SERPL-SCNC: 3.4 MMOL/L (ref 3.5–5.1)
PROCALCITONIN SERPL-MCNC: 0.18 NG/ML
PROT SERPL-MCNC: 6.6 GM/DL (ref 6–8.4)
PROT UR QL STRIP: ABNORMAL
RBC # BLD AUTO: 4.01 M/UL (ref 4.6–6.2)
RBC #/AREA URNS AUTO: <1 /HPF (ref 0–4)
RELATIVE EOSINOPHIL (OHS): 3.5 %
RELATIVE LYMPHOCYTE (OHS): 8.3 % (ref 18–48)
RELATIVE MONOCYTE (OHS): 10.6 % (ref 4–15)
RELATIVE NEUTROPHIL (OHS): 76.5 % (ref 38–73)
SARS-COV-2 RDRP RESP QL NAA+PROBE: NEGATIVE
SODIUM SERPL-SCNC: 133 MMOL/L (ref 136–145)
SP GR UR STRIP: 1.01
SPECIMEN SOURCE: NORMAL
TROPONIN I SERPL HS-MCNC: 187 NG/L
UROBILINOGEN UR STRIP-ACNC: NEGATIVE EU/DL
WBC # BLD AUTO: 8.24 K/UL (ref 3.9–12.7)
WBC #/AREA URNS AUTO: <1 /HPF (ref 0–5)

## 2025-07-22 PROCEDURE — 99900035 HC TECH TIME PER 15 MIN (STAT): Mod: HCNC

## 2025-07-22 PROCEDURE — 84145 PROCALCITONIN (PCT): CPT | Mod: HCNC | Performed by: EMERGENCY MEDICINE

## 2025-07-22 PROCEDURE — 96367 TX/PROPH/DG ADDL SEQ IV INF: CPT | Mod: HCNC

## 2025-07-22 PROCEDURE — 87502 INFLUENZA DNA AMP PROBE: CPT | Mod: HCNC | Performed by: EMERGENCY MEDICINE

## 2025-07-22 PROCEDURE — 83735 ASSAY OF MAGNESIUM: CPT | Mod: HCNC | Performed by: EMERGENCY MEDICINE

## 2025-07-22 PROCEDURE — 25000003 PHARM REV CODE 250: Mod: HCNC | Performed by: INTERNAL MEDICINE

## 2025-07-22 PROCEDURE — 63600175 PHARM REV CODE 636 W HCPCS: Mod: HCNC | Performed by: EMERGENCY MEDICINE

## 2025-07-22 PROCEDURE — 96366 THER/PROPH/DIAG IV INF ADDON: CPT

## 2025-07-22 PROCEDURE — 81003 URINALYSIS AUTO W/O SCOPE: CPT | Mod: HCNC | Performed by: EMERGENCY MEDICINE

## 2025-07-22 PROCEDURE — 25000003 PHARM REV CODE 250: Mod: HCNC | Performed by: EMERGENCY MEDICINE

## 2025-07-22 PROCEDURE — G0378 HOSPITAL OBSERVATION PER HR: HCPCS | Mod: HCNC

## 2025-07-22 PROCEDURE — 83605 ASSAY OF LACTIC ACID: CPT | Mod: HCNC

## 2025-07-22 PROCEDURE — 84075 ASSAY ALKALINE PHOSPHATASE: CPT | Mod: HCNC | Performed by: EMERGENCY MEDICINE

## 2025-07-22 PROCEDURE — 63600175 PHARM REV CODE 636 W HCPCS: Mod: HCNC | Performed by: INTERNAL MEDICINE

## 2025-07-22 PROCEDURE — 84484 ASSAY OF TROPONIN QUANT: CPT | Mod: HCNC | Performed by: EMERGENCY MEDICINE

## 2025-07-22 PROCEDURE — 99285 EMERGENCY DEPT VISIT HI MDM: CPT | Mod: 25,HCNC

## 2025-07-22 PROCEDURE — 93010 ELECTROCARDIOGRAM REPORT: CPT | Mod: HCNC,,, | Performed by: INTERNAL MEDICINE

## 2025-07-22 PROCEDURE — 83880 ASSAY OF NATRIURETIC PEPTIDE: CPT | Mod: HCNC | Performed by: EMERGENCY MEDICINE

## 2025-07-22 PROCEDURE — 87040 BLOOD CULTURE FOR BACTERIA: CPT | Mod: HCNC | Performed by: EMERGENCY MEDICINE

## 2025-07-22 PROCEDURE — U0002 COVID-19 LAB TEST NON-CDC: HCPCS | Mod: HCNC | Performed by: EMERGENCY MEDICINE

## 2025-07-22 PROCEDURE — 93005 ELECTROCARDIOGRAM TRACING: CPT | Mod: HCNC

## 2025-07-22 PROCEDURE — 85025 COMPLETE CBC W/AUTO DIFF WBC: CPT | Mod: HCNC | Performed by: EMERGENCY MEDICINE

## 2025-07-22 PROCEDURE — 11000001 HC ACUTE MED/SURG PRIVATE ROOM: Mod: HCNC

## 2025-07-22 PROCEDURE — 84100 ASSAY OF PHOSPHORUS: CPT | Mod: HCNC | Performed by: EMERGENCY MEDICINE

## 2025-07-22 PROCEDURE — 96365 THER/PROPH/DIAG IV INF INIT: CPT | Mod: HCNC

## 2025-07-22 RX ORDER — PREDNISONE 5 MG/1
5 TABLET ORAL DAILY
Status: DISCONTINUED | OUTPATIENT
Start: 2025-07-23 | End: 2025-07-23 | Stop reason: HOSPADM

## 2025-07-22 RX ORDER — IBUPROFEN 200 MG
16 TABLET ORAL
Status: DISCONTINUED | OUTPATIENT
Start: 2025-07-22 | End: 2025-07-23 | Stop reason: HOSPADM

## 2025-07-22 RX ORDER — MECLIZINE HYDROCHLORIDE 25 MG/1
25 TABLET ORAL 3 TIMES DAILY PRN
Status: DISCONTINUED | OUTPATIENT
Start: 2025-07-22 | End: 2025-07-23 | Stop reason: HOSPADM

## 2025-07-22 RX ORDER — VALSARTAN 160 MG/1
160 TABLET ORAL 2 TIMES DAILY
Status: DISCONTINUED | OUTPATIENT
Start: 2025-07-22 | End: 2025-07-22

## 2025-07-22 RX ORDER — ERGOCALCIFEROL 1.25 MG/1
50000 CAPSULE ORAL
Status: DISCONTINUED | OUTPATIENT
Start: 2025-07-23 | End: 2025-07-23 | Stop reason: HOSPADM

## 2025-07-22 RX ORDER — LANOLIN ALCOHOL/MO/W.PET/CERES
400 CREAM (GRAM) TOPICAL 2 TIMES DAILY
Status: DISCONTINUED | OUTPATIENT
Start: 2025-07-22 | End: 2025-07-23 | Stop reason: HOSPADM

## 2025-07-22 RX ORDER — SODIUM CHLORIDE 0.9 % (FLUSH) 0.9 %
10 SYRINGE (ML) INJECTION
Status: DISCONTINUED | OUTPATIENT
Start: 2025-07-22 | End: 2025-07-23 | Stop reason: HOSPADM

## 2025-07-22 RX ORDER — POLYETHYLENE GLYCOL 3350 17 G/17G
17 POWDER, FOR SOLUTION ORAL DAILY
Status: DISCONTINUED | OUTPATIENT
Start: 2025-07-23 | End: 2025-07-23 | Stop reason: HOSPADM

## 2025-07-22 RX ORDER — NALOXONE HCL 0.4 MG/ML
0.02 VIAL (ML) INJECTION
Status: DISCONTINUED | OUTPATIENT
Start: 2025-07-22 | End: 2025-07-23 | Stop reason: HOSPADM

## 2025-07-22 RX ORDER — SODIUM CHLORIDE 0.9 % (FLUSH) 0.9 %
10 SYRINGE (ML) INJECTION
Status: DISCONTINUED | OUTPATIENT
Start: 2025-07-22 | End: 2025-07-22

## 2025-07-22 RX ORDER — ALUMINUM HYDROXIDE, MAGNESIUM HYDROXIDE, AND SIMETHICONE 1200; 120; 1200 MG/30ML; MG/30ML; MG/30ML
30 SUSPENSION ORAL 4 TIMES DAILY PRN
Status: DISCONTINUED | OUTPATIENT
Start: 2025-07-22 | End: 2025-07-23 | Stop reason: HOSPADM

## 2025-07-22 RX ORDER — DOXAZOSIN 8 MG/1
16 TABLET ORAL DAILY
Status: DISCONTINUED | OUTPATIENT
Start: 2025-07-23 | End: 2025-07-23 | Stop reason: HOSPADM

## 2025-07-22 RX ORDER — METOPROLOL TARTRATE 25 MG/1
25 TABLET, FILM COATED ORAL 2 TIMES DAILY
Status: DISCONTINUED | OUTPATIENT
Start: 2025-07-22 | End: 2025-07-23 | Stop reason: HOSPADM

## 2025-07-22 RX ORDER — INSULIN ASPART 100 [IU]/ML
0-10 INJECTION, SOLUTION INTRAVENOUS; SUBCUTANEOUS
Status: DISCONTINUED | OUTPATIENT
Start: 2025-07-22 | End: 2025-07-23 | Stop reason: HOSPADM

## 2025-07-22 RX ORDER — TALC
6 POWDER (GRAM) TOPICAL NIGHTLY PRN
Status: DISCONTINUED | OUTPATIENT
Start: 2025-07-22 | End: 2025-07-23 | Stop reason: HOSPADM

## 2025-07-22 RX ORDER — NAPROXEN SODIUM 220 MG/1
81 TABLET, FILM COATED ORAL DAILY
Status: DISCONTINUED | OUTPATIENT
Start: 2025-07-23 | End: 2025-07-23 | Stop reason: HOSPADM

## 2025-07-22 RX ORDER — IBUPROFEN 200 MG
24 TABLET ORAL
Status: DISCONTINUED | OUTPATIENT
Start: 2025-07-22 | End: 2025-07-23 | Stop reason: HOSPADM

## 2025-07-22 RX ORDER — TALC
6 POWDER (GRAM) TOPICAL NIGHTLY PRN
Status: DISCONTINUED | OUTPATIENT
Start: 2025-07-22 | End: 2025-07-22

## 2025-07-22 RX ORDER — ATORVASTATIN CALCIUM 40 MG/1
80 TABLET, FILM COATED ORAL DAILY
Status: DISCONTINUED | OUTPATIENT
Start: 2025-07-23 | End: 2025-07-23 | Stop reason: HOSPADM

## 2025-07-22 RX ORDER — SODIUM CHLORIDE 0.9 % (FLUSH) 0.9 %
10 SYRINGE (ML) INJECTION EVERY 12 HOURS PRN
Status: DISCONTINUED | OUTPATIENT
Start: 2025-07-22 | End: 2025-07-22

## 2025-07-22 RX ORDER — POTASSIUM CHLORIDE 20 MEQ/1
20 TABLET, EXTENDED RELEASE ORAL DAILY
Status: DISCONTINUED | OUTPATIENT
Start: 2025-07-23 | End: 2025-07-23 | Stop reason: HOSPADM

## 2025-07-22 RX ORDER — INSULIN ASPART 100 [IU]/ML
0.7 INJECTION, SOLUTION INTRAVENOUS; SUBCUTANEOUS CONTINUOUS
Status: DISCONTINUED | OUTPATIENT
Start: 2025-07-22 | End: 2025-07-23 | Stop reason: HOSPADM

## 2025-07-22 RX ORDER — LANOLIN ALCOHOL/MO/W.PET/CERES
1 CREAM (GRAM) TOPICAL DAILY
Status: DISCONTINUED | OUTPATIENT
Start: 2025-07-23 | End: 2025-07-23 | Stop reason: HOSPADM

## 2025-07-22 RX ORDER — VALSARTAN 160 MG/1
160 TABLET ORAL 2 TIMES DAILY
Status: DISCONTINUED | OUTPATIENT
Start: 2025-07-22 | End: 2025-07-23

## 2025-07-22 RX ORDER — ONDANSETRON 8 MG/1
8 TABLET, ORALLY DISINTEGRATING ORAL EVERY 8 HOURS PRN
Status: DISCONTINUED | OUTPATIENT
Start: 2025-07-22 | End: 2025-07-23 | Stop reason: HOSPADM

## 2025-07-22 RX ORDER — FAMOTIDINE 20 MG/1
20 TABLET, FILM COATED ORAL DAILY PRN
Status: DISCONTINUED | OUTPATIENT
Start: 2025-07-22 | End: 2025-07-23 | Stop reason: HOSPADM

## 2025-07-22 RX ORDER — VANCOMYCIN 1.75 GRAM/500 ML IN 0.9 % SODIUM CHLORIDE INTRAVENOUS
20
Status: COMPLETED | OUTPATIENT
Start: 2025-07-22 | End: 2025-07-22

## 2025-07-22 RX ORDER — GLUCAGON 1 MG
1 KIT INJECTION
Status: DISCONTINUED | OUTPATIENT
Start: 2025-07-22 | End: 2025-07-23 | Stop reason: HOSPADM

## 2025-07-22 RX ORDER — ACETAMINOPHEN 325 MG/1
650 TABLET ORAL EVERY 4 HOURS PRN
Status: DISCONTINUED | OUTPATIENT
Start: 2025-07-22 | End: 2025-07-23 | Stop reason: HOSPADM

## 2025-07-22 RX ORDER — IPRATROPIUM BROMIDE AND ALBUTEROL SULFATE 2.5; .5 MG/3ML; MG/3ML
3 SOLUTION RESPIRATORY (INHALATION) EVERY 6 HOURS PRN
Status: DISCONTINUED | OUTPATIENT
Start: 2025-07-22 | End: 2025-07-23 | Stop reason: HOSPADM

## 2025-07-22 RX ORDER — GABAPENTIN 300 MG/1
300 CAPSULE ORAL 2 TIMES DAILY
Status: DISCONTINUED | OUTPATIENT
Start: 2025-07-22 | End: 2025-07-23 | Stop reason: HOSPADM

## 2025-07-22 RX ORDER — TACROLIMUS 1 MG/1
1 CAPSULE ORAL 2 TIMES DAILY
Status: DISCONTINUED | OUTPATIENT
Start: 2025-07-22 | End: 2025-07-23 | Stop reason: HOSPADM

## 2025-07-22 RX ORDER — TORSEMIDE 20 MG/1
40 TABLET ORAL DAILY
Status: DISCONTINUED | OUTPATIENT
Start: 2025-07-23 | End: 2025-07-22

## 2025-07-22 RX ADMIN — VALSARTAN 160 MG: 160 TABLET, FILM COATED ORAL at 11:07

## 2025-07-22 RX ADMIN — MECLIZINE HYDROCHLORIDE 25 MG: 25 TABLET ORAL at 11:07

## 2025-07-22 RX ADMIN — PIPERACILLIN SODIUM AND TAZOBACTAM SODIUM 4.5 G: 4; .5 INJECTION, POWDER, LYOPHILIZED, FOR SOLUTION INTRAVENOUS at 07:07

## 2025-07-22 RX ADMIN — METOPROLOL TARTRATE 25 MG: 25 TABLET, FILM COATED ORAL at 09:07

## 2025-07-22 RX ADMIN — HYDRALAZINE HYDROCHLORIDE: 25 TABLET ORAL at 09:07

## 2025-07-22 RX ADMIN — VANCOMYCIN HYDROCHLORIDE 1750 MG: 500 INJECTION, POWDER, LYOPHILIZED, FOR SOLUTION INTRAVENOUS at 08:07

## 2025-07-22 RX ADMIN — GABAPENTIN 300 MG: 300 CAPSULE ORAL at 09:07

## 2025-07-22 RX ADMIN — TACROLIMUS 1 MG: 1 CAPSULE ORAL at 08:07

## 2025-07-22 RX ADMIN — Medication 400 MG: at 09:07

## 2025-07-22 NOTE — ED PROVIDER NOTES
Encounter Date: 7/22/2025       History     Chief Complaint   Patient presents with    Fatigue     Patient arrived via EMS for weakness, dizziness and a fever for the past few days. Kidney transplant in 2016.      HPI  69-year-old male with past medical history as noted below, history of kidney transplant in 2016, coming in for recurrent fevers starting today as well as generalized weakness and lightheadedness.  Wife said he had a fever of 102.2 today.     He was recently seen in the hospital for fevers possible aspiration pneumonia treated with Flagyl and cefepime he says he was feeling better upon discharge and felt okay on the weekend but then today symptoms recurred as above.    He denies any chest pain or shortness of breath, denies any cough, denies any abdominal pain, denies any dysuria frequency, denies any vomiting or diarrhea.  No other associated symptoms.  Denies any skin redness or rashes.  No significant headache.    Review of patient's allergies indicates:   Allergen Reactions    Nifedipine Other (See Comments)     Gingival hyperplasia     Past Medical History:   Diagnosis Date    Anxiety 07/29/2014    Arthritis     atrial fibrillation     History of cardioversion    Bilateral diabetic retinopathy 2017    Cardioembolic stroke 12/07/2024    almost completely resolved - very mild left hand weakness    CKD (chronic kidney disease)     in transplanted kidney    Colon polyps 2014    Depression - situational 07/29/2014    Diabetes type 2 2000    since 2000.  c/b neuropathy, CKD    Encounter for blood transfusion     Febrile illness 07/22/2025    History of hepatitis C, s/p successful Rx w/ SVR24 - 9/2017 07/29/2014    Genotype 1a, treatment naive 10/2014 liver biopsy - grade 1 / stage 1 Completed Harvoni w/ SVR    Hyperlipidemia 07/29/2014    Hypertension     Hyponatremia 02/20/2025    Pancreatitis     S/P cadaveric kidney transplant 11/5/2016. ESRD secondary to HTN/DMII 11/05/2016    Stroke 2024     Past  Surgical History:   Procedure Laterality Date    ABLATION OF ARRHYTHMOGENIC FOCUS FOR ATRIAL FIBRILLATION N/A 6/11/2024    Procedure: Ablation atrial fibrillation;  Surgeon: Bronson Bowden MD;  Location: [facility] EP LAB;  Service: Cardiology;  Laterality: N/A;  AF, FELICIANO (cx if SR), PVI, RFA, Carto, Gen, GP, 3 Prep    ABLATION OF ARRHYTHMOGENIC FOCUS FOR ATRIAL FIBRILLATION N/A 6/12/2025    Procedure: Ablation atrial fibrillation;  Surgeon: Bronson Bowden MD;  Location: [facility] EP LAB;  Service: Cardiology;  Laterality: N/A;  AF, FELICIANO (h/o CVA), redo PVI, CTI, RFA, Carto, Gen, GP, 3 Prep *insulin pump*    ABLATION, ATRIAL FLUTTER, ATYPICAL  6/12/2025    Procedure: Ablation, Atrial Flutter, Atypical;  Surgeon: Bronson Bowden MD;  Location: [facility] EP LAB;  Service: Cardiology;;    ABLATION, ATRIAL FLUTTER, TYPICAL  6/11/2024    Procedure: Ablation, Atrial Flutter, Typical;  Surgeon: Bronson Bowden MD;  Location: [facility] EP LAB;  Service: Cardiology;;    ABLATION, ATRIAL FLUTTER, TYPICAL N/A 6/12/2025    Procedure: Ablation, Atrial Flutter, Typical;  Surgeon: Bronson Bowden MD;  Location: [facility] EP LAB;  Service: Cardiology;  Laterality: N/A;    APPENDECTOMY      BONE MARROW BIOPSY Left 6/26/2024    Procedure: Biopsy-bone marrow;  Surgeon: Bridger Zapata MD;  Location: [facility] ENDO ([address] [place] [address]);  Service: Oncology;  Laterality: Left;  6/24-pt knows to hold aspirin and eliquis as instructed by hem/onc, precall complete-Kpvt    CARDIOVERSION  6/11/2024    Procedure: Cardioversion;  Surgeon: Bronson Bowden MD;  Location: [facility] EP LAB;  Service: Cardiology;;    CATARACT EXTRACTION W/  INTRAOCULAR LENS IMPLANT Right 3/13/2024    Procedure: EXTRACTION, CATARACT, WITH IOL INSERTION;  Surgeon: Jennifer Palacio MD;  Location: [facility] OR;  Service: Ophthalmology;  Laterality: Right;    CATARACT EXTRACTION W/  INTRAOCULAR LENS IMPLANT Left 4/10/2024    Procedure: EXTRACTION, CATARACT, WITH IOL INSERTION;  Surgeon: Jennifer Palacio MD;   Location: [facility] OR;  Service: Ophthalmology;  Laterality: Left;    COLONOSCOPY N/A 12/21/2020    Procedure: COLONOSCOPY;  Surgeon: Tico Bell MD;  Location: [facility] ENDO ([facility] FLR);  Service: Endoscopy;  Laterality: N/A;  ok to hold eliquis per Dr. Valadez, see telephone encounter 11/13/2020-MS  covid test 12/18 [facility]    ECHOCARDIOGRAM,TRANSESOPHAGEAL N/A 2/3/2025    Procedure: Transesophageal echo (FELICIANO) intra-procedure log documentation;  Surgeon: Grayson Lin III, MD;  Location: [facility] EP LAB;  Service: Cardiology;  Laterality: N/A;    KIDNEY TRANSPLANT      TRANSESOPHAGEAL ECHOCARDIOGRAPHY N/A 8/26/2019    Procedure: ECHOCARDIOGRAM, TRANSESOPHAGEAL;  Surgeon: [facility] Diagnostic Provider;  Location: [facility] EP LAB;  Service: Cardiology;  Laterality: N/A;    TRANSESOPHAGEAL ECHOCARDIOGRAPHY N/A 6/11/2024    Procedure: ECHOCARDIOGRAM, TRANSESOPHAGEAL;  Surgeon: Grayson Lin III, MD;  Location: [facility] EP LAB;  Service: Cardiology;  Laterality: N/A;    TRANSESOPHAGEAL ECHOCARDIOGRAPHY N/A 6/12/2025    Procedure: ECHOCARDIOGRAM, TRANSESOPHAGEAL;  Surgeon: Breezy Nguyen MD;  Location: [facility] EP LAB;  Service: Cardiology;  Laterality: N/A;    TREATMENT OF CARDIAC ARRHYTHMIA N/A 8/26/2019    Procedure: CARDIOVERSION;  Surgeon: Bronson Bowden MD;  Location: [facility] EP LAB;  Service: Cardiology;  Laterality: N/A;  AF, FELICIANO, DCCV, MAC, GP, 3 PREP    TREATMENT OF CARDIAC ARRHYTHMIA N/A 2/3/2025    Procedure: Cardioversion or Defibrillation;  Surgeon: Bronson Bowden MD;  Location: [facility] EP LAB;  Service: Cardiology;  Laterality: N/A;  AF, FELICIANO, DCCV, MAC, GP, 3 Prep     Family History   Problem Relation Name Age of Onset    Diabetes Mother      Hypertension Mother      Heart disease Mother          CAD with PCI    Heart disease Father      Cancer Brother 2         one sister with breast cancer    Hypertension Brother 2         one sister with HTN and borderline DM    Stroke Neg Hx      Kidney disease Neg Hx      Colon  cancer Neg Hx      Esophageal cancer Neg Hx       Social History[1]  Review of Systems    Physical Exam     Initial Vitals [07/22/25 1445]   BP Pulse Resp Temp SpO2   (!) 180/72 64 16 98.7 °F (37.1 °C) 98 %      MAP       --         Physical Exam    Physical Exam:  CONSTITUTIONAL: Well developed, well nourished, in no acute distress.  HENT: Normocephalic, atraumatic    EYES: Sclerae anicteric   NECK: Supple, no thyroid enlargement, no meningismus.  CARDIOVASCULAR: Regular rate and rhythm without any murmurs, gallops, rubs.  Lower extremities unremarkable.  RESPIRATORY: Speaking in full sentences. Breathing comfortably. Auscultation of the lungs revealed normal breath sounds b/l, no wheezing, no rales, no rhonchi.  ABDOMEN: Soft and nontender, no masses, no rebound or guarding, specifically no tenderness above the transplant kidney.  NEUROLOGIC: Alert, interacting normally. No facial droop. Voice is clear. Speech is fluent.  MSK: Moving all four extremities.  SKIN: Warm and dry. No visible rash on exposed areas of skin.    Psych:  Flat affect      ED Course   Procedures  Labs Reviewed   COMPREHENSIVE METABOLIC PANEL - Abnormal       Result Value    Sodium 133 (*)     Potassium 3.4 (*)     Chloride 101      CO2 22 (*)     Glucose 116 (*)     BUN 70 (*)     Creatinine 4.0 (*)     Calcium 9.1      Protein Total 6.6      Albumin 2.6 (*)     Bilirubin Total 0.3      ALP 57      AST 20      ALT 8 (*)     Anion Gap 10      eGFR 16 (*)    URINALYSIS, REFLEX TO URINE CULTURE - Abnormal    Color, UA Colorless (*)     Appearance, UA Clear      pH, UA 7.0      Spec Grav UA 1.010      Protein, UA 1+ (*)     Glucose, UA 2+ (*)     Ketones, UA Negative      Bilirubin, UA Negative      Blood, UA Negative      Nitrites, UA Negative      Urobilinogen, UA Negative      Leukocyte Esterase, UA Negative     B-TYPE NATRIURETIC PEPTIDE - Abnormal    BNP 4,520 (*)    TROPONIN I HIGH SENSITIVITY - Abnormal    Troponin High Sensitive 187 (*)     CBC WITH DIFFERENTIAL - Abnormal    WBC 8.24      RBC 4.01 (*)     HGB 8.5 (*)     HCT 27.6 (*)     MCV 69 (*)     MCH 21.2 (*)     MCHC 30.8 (*)     RDW 18.8 (*)     Platelet Count 214      MPV        Nucleated RBC 0      Neut % 76.5 (*)     Lymph % 8.3 (*)     Mono % 10.6      Eos % 3.5      Basophil % 0.5      Imm Grans % 0.6 (*)     Neut # 6.31      Lymph # 0.68 (*)     Mono # 0.87      Eos # 0.29      Baso # 0.04      Imm Grans # 0.05 (*)    URINALYSIS MICROSCOPIC - Abnormal    RBC, UA <1      WBC, UA <1      Bacteria, UA Rare      Hyaline Casts, UA 3 (*)     Microscopic Comment       INFLUENZA A & B BY MOLECULAR - Normal    INFLUENZA A MOLECULAR Negative      INFLUENZA B MOLECULAR  Negative     MAGNESIUM - Normal    Magnesium  1.7     PHOSPHORUS - Normal    Phosphorus Level 3.6     PROCALCITONIN - Normal    Procalcitonin 0.18     SARS-COV-2 RNA AMPLIFICATION, QUAL - Normal    SARS COV-2 Molecular Negative     CBC W/ AUTO DIFFERENTIAL    Narrative:     The following orders were created for panel order CBC auto differential.  Procedure                               Abnormality         Status                     ---------                               -----------         ------                     CBC with Differential[[id number]]       Abnormal            Final result                 Please view results for these tests on the individual orders.   GREY TOP URINE HOLD   POCT GLUCOSE MONITORING CONTINUOUS          Imaging Results              X-Ray Chest AP Portable (Final result)  Result time 07/22/25 20:16:22      Final result by Winston Inman MD (07/22/25 20:16:22)                   Impression:      No detrimental change, noting improved aeration from prior chest radiograph 07/12/2025.  No consolidation, radiographic evidence of pneumonia or other source of fever, noting that early/mild viral or atypical bacterial pneumonia may be radiographically occult.      Electronically signed by: Winston Inman  MD  Date:    07/22/2025  Time:    20:16               Narrative:    EXAMINATION:  XR CHEST AP PORTABLE    CLINICAL HISTORY:  fever;    TECHNIQUE:  Single frontal view of the chest was performed.    COMPARISON:  Chest CT 07/13/2025 and chest radiograph 07/12/2025    FINDINGS:  No detrimental change.  Improved aeration from previous chest radiograph.  Trachea is midline and patent.  Cardiomediastinal silhouette is midline with similar calcification and tortuosity of the aorta and mildly enlarged heart.  Pulmonary vasculature and hilar contours are within normal limits.    Bibasilar mild platelike scarring versus atelectasis.  The lungs are otherwise well expanded without consolidation, sizeable pleural effusion or pneumothorax.    No acute osseous process seen.  PA and lateral views can be obtained.                                       Medications   albuterol-ipratropium 2.5 mg-0.5 mg/3 mL nebulizer solution 3 mL (has no administration in time range)   melatonin tablet 6 mg (has no administration in time range)   ondansetron disintegrating tablet 8 mg (has no administration in time range)   polyethylene glycol packet 17 g (has no administration in time range)   aluminum-magnesium hydroxide-simethicone 200-200-20 mg/5 mL suspension 30 mL (has no administration in time range)   acetaminophen tablet 650 mg (has no administration in time range)   naloxone 0.4 mg/mL injection 0.02 mg (has no administration in time range)   sodium chloride 0.9% flush 10 mL (has no administration in time range)   aspirin chewable tablet 81 mg (has no administration in time range)   atorvastatin tablet 80 mg (has no administration in time range)   famotidine tablet 20 mg (has no administration in time range)   meclizine tablet 25 mg (25 mg Oral Given 7/22/25 2332)   predniSONE tablet 5 mg (has no administration in time range)   rivaroxaban tablet 15 mg (has no administration in time range)   tacrolimus capsule 1 mg (1 mg Oral Given 7/22/25  2039)   doxazosin tablet 16 mg (has no administration in time range)   furosemide tablet 60 mg (has no administration in time range)   glucose chewable tablet 16 g (has no administration in time range)   glucose chewable tablet 24 g (has no administration in time range)   dextrose 50% injection 12.5 g (has no administration in time range)   dextrose 50% injection 25 g (has no administration in time range)   glucagon (human recombinant) injection 1 mg (has no administration in time range)   insulin aspart U-100 insulin pump from home (has no administration in time range)   insulin aspart U-100 insulin pump from home 0-10 Units (has no administration in time range)   epoetin tanya injection 4,000 Units (has no administration in time range)   ergocalciferol capsule 50,000 Units (has no administration in time range)   ferrous sulfate tablet 1 each (has no administration in time range)   gabapentin capsule 300 mg (300 mg Oral Given 7/22/25 2155)   hydrALAZINE 10 mg 2 tablets, hydrALAZINE 25 mg 2 tablets, isorsorbide dinitrate 20 mg  2 tablets combination ( Oral Given 7/22/25 2154)   magnesium oxide tablet 400 mg (400 mg Oral Given 7/22/25 2156)   metoprolol tartrate (LOPRESSOR) tablet 25 mg (25 mg Oral Given 7/22/25 2155)   potassium chloride SA CR tablet 20 mEq (has no administration in time range)   valsartan tablet 160 mg (160 mg Oral Given 7/22/25 2332)   empagliflozin (Jardiance) tablet 10 mg (has no administration in time range)   piperacillin-tazobactam (ZOSYN) 4.5 g in D5W 100 mL IVPB (MB+) (0 g Intravenous Stopped 7/22/25 2032)   vancomycin 1750 mg in 0.9% sodium chloride 500 mL IVPB (0 mg Intravenous Stopped 7/22/25 2239)     Medical Decision Making  Amount and/or Complexity of Data Reviewed  Labs: ordered.  Radiology: ordered.    Risk  Prescription drug management.  Decision regarding hospitalization.      69-year-old male with past history as noted coming in with the current fevers in the context of recent  admission for possible aspiration pneumonia.  Exam as noted above.  Benign.  It appears that he felt improved but then symptoms recurred.  Recent admission included ID consult for help with management of his infectious status given his transplant.    I am concerned that he may have a recurrent infection, will undertake repeat septic workup including lactate, protocol to risk stratify, chest x-ray, cultures, urine studies, COVID and flu swab.    Given his recurrent fevers, recent admission, transplant status, report of generalized weakness and fatigue which wife describes the significant will anticipate for readmission to the hospital.    Update:  In the ED he remains well-appearing hemodynamically stable on hypertensive.    Labs are unremarkable, mild hypokalemia, urine without signs of infection, chest x-ray with no interval change.    BNP is significantly elevated but is near patient's baseline, troponin is elevated also similar to patient's baseline, do not suspect fluid overload or ACS at this time.    I covered him with 1 dose of broad-spectrum antibiotics given his immunocompromised status and recurrent fevers.  Will admit to Hospital Medicine for further inpatient workup continued monitoring, follow up cultures.  Unclear source at this time.  Possible related to the mass that was recently diagnosed?  Versus viral etiology?  But will cover for bacterial given his kidney transplant status.      Decision was made to hospitalize the patient.  Acute illness with threat to bodily function.  Discussed with hospital medicine.                              .    Clinical Impression:  Final diagnoses:  [R53.1] Generalized weakness  [Z13.6] Screening for cardiovascular condition  [R50.9] Fever, unspecified fever cause (Primary)          ED Disposition Condition    Admit                       [1]   Social History  Tobacco Use    Smoking status: Former     Current packs/day: 0.00     Average packs/day: 0.5 packs/day for  32.0 years (16.0 ttl pk-yrs)     Types: Cigarettes     Start date: 1984     Quit date: 2016     Years since quittin.6    Smokeless tobacco: Never   Vaping Use    Vaping status: Never Used   Substance Use Topics    Alcohol use: Not Currently    Drug use: Ike Puga MD  25 0032

## 2025-07-23 VITALS
WEIGHT: 190 LBS | RESPIRATION RATE: 18 BRPM | SYSTOLIC BLOOD PRESSURE: 169 MMHG | BODY MASS INDEX: 25.18 KG/M2 | HEIGHT: 73 IN | OXYGEN SATURATION: 98 % | DIASTOLIC BLOOD PRESSURE: 70 MMHG | HEART RATE: 59 BPM | TEMPERATURE: 98 F

## 2025-07-23 DIAGNOSIS — R50.9 FUO (FEVER OF UNKNOWN ORIGIN): Primary | ICD-10-CM

## 2025-07-23 LAB
ABSOLUTE EOSINOPHIL (OHS): 0.12 K/UL
ABSOLUTE MONOCYTE (OHS): 0.98 K/UL (ref 0.3–1)
ABSOLUTE NEUTROPHIL COUNT (OHS): 4.73 K/UL (ref 1.8–7.7)
ANION GAP (OHS): 11 MMOL/L (ref 8–16)
BASOPHILS # BLD AUTO: 0.04 K/UL
BASOPHILS NFR BLD AUTO: 0.5 %
BUN SERPL-MCNC: 70 MG/DL (ref 8–23)
CALCIUM SERPL-MCNC: 9 MG/DL (ref 8.7–10.5)
CHLORIDE SERPL-SCNC: 106 MMOL/L (ref 95–110)
CO2 SERPL-SCNC: 20 MMOL/L (ref 23–29)
CREAT SERPL-MCNC: 3.8 MG/DL (ref 0.5–1.4)
CRP SERPL-MCNC: 106.2 MG/L
ERYTHROCYTE [DISTWIDTH] IN BLOOD BY AUTOMATED COUNT: 18.7 % (ref 11.5–14.5)
GFR SERPLBLD CREATININE-BSD FMLA CKD-EPI: 17 ML/MIN/1.73/M2
GLUCOSE SERPL-MCNC: 92 MG/DL (ref 70–110)
HCT VFR BLD AUTO: 28 % (ref 40–54)
HGB BLD-MCNC: 8.6 GM/DL (ref 14–18)
HOLD SPECIMEN: NORMAL
IMM GRANULOCYTES # BLD AUTO: 0.04 K/UL (ref 0–0.04)
IMM GRANULOCYTES NFR BLD AUTO: 0.5 % (ref 0–0.5)
LYMPHOCYTES # BLD AUTO: 1.41 K/UL (ref 1–4.8)
MAGNESIUM SERPL-MCNC: 1.9 MG/DL (ref 1.6–2.6)
MCH RBC QN AUTO: 21.2 PG (ref 27–31)
MCHC RBC AUTO-ENTMCNC: 30.7 G/DL (ref 32–36)
MCV RBC AUTO: 69 FL (ref 82–98)
NUCLEATED RBC (/100WBC) (OHS): 0 /100 WBC
OHS QRS DURATION: 120 MS
OHS QTC CALCULATION: 405 MS
PHOSPHATE SERPL-MCNC: 4.4 MG/DL (ref 2.7–4.5)
PLATELET # BLD AUTO: 207 K/UL (ref 150–450)
PMV BLD AUTO: ABNORMAL FL
POCT GLUCOSE: 102 MG/DL (ref 70–110)
POCT GLUCOSE: 115 MG/DL (ref 70–110)
POCT GLUCOSE: 118 MG/DL (ref 70–110)
POCT GLUCOSE: 99 MG/DL (ref 70–110)
POTASSIUM SERPL-SCNC: 3.7 MMOL/L (ref 3.5–5.1)
RBC # BLD AUTO: 4.05 M/UL (ref 4.6–6.2)
RELATIVE EOSINOPHIL (OHS): 1.6 %
RELATIVE LYMPHOCYTE (OHS): 19.3 % (ref 18–48)
RELATIVE MONOCYTE (OHS): 13.4 % (ref 4–15)
RELATIVE NEUTROPHIL (OHS): 64.7 % (ref 38–73)
SODIUM SERPL-SCNC: 137 MMOL/L (ref 136–145)
TACROLIMUS BLD-MCNC: 7.6 NG/ML (ref 5–15)
WBC # BLD AUTO: 7.32 K/UL (ref 3.9–12.7)

## 2025-07-23 PROCEDURE — 36415 COLL VENOUS BLD VENIPUNCTURE: CPT | Mod: HCNC | Performed by: INTERNAL MEDICINE

## 2025-07-23 PROCEDURE — 80197 ASSAY OF TACROLIMUS: CPT | Mod: HCNC | Performed by: INTERNAL MEDICINE

## 2025-07-23 PROCEDURE — 87305 ASPERGILLUS AG IA: CPT | Mod: HCNC | Performed by: INTERNAL MEDICINE

## 2025-07-23 PROCEDURE — 87798 DETECT AGENT NOS DNA AMP: CPT | Mod: HCNC | Performed by: INTERNAL MEDICINE

## 2025-07-23 PROCEDURE — 86777 TOXOPLASMA ANTIBODY: CPT | Mod: HCNC | Performed by: INTERNAL MEDICINE

## 2025-07-23 PROCEDURE — 25000003 PHARM REV CODE 250: Mod: HCNC | Performed by: PHYSICIAN ASSISTANT

## 2025-07-23 PROCEDURE — 85025 COMPLETE CBC W/AUTO DIFF WBC: CPT | Mod: HCNC | Performed by: INTERNAL MEDICINE

## 2025-07-23 PROCEDURE — 86778 TOXOPLASMA ANTIBODY IGM: CPT | Mod: HCNC | Performed by: INTERNAL MEDICINE

## 2025-07-23 PROCEDURE — 84100 ASSAY OF PHOSPHORUS: CPT | Mod: HCNC | Performed by: INTERNAL MEDICINE

## 2025-07-23 PROCEDURE — 80048 BASIC METABOLIC PNL TOTAL CA: CPT | Mod: HCNC | Performed by: INTERNAL MEDICINE

## 2025-07-23 PROCEDURE — 99222 1ST HOSP IP/OBS MODERATE 55: CPT | Mod: HCNC,,, | Performed by: INTERNAL MEDICINE

## 2025-07-23 PROCEDURE — 86635 COCCIDIOIDES ANTIBODY: CPT | Mod: HCNC | Performed by: INTERNAL MEDICINE

## 2025-07-23 PROCEDURE — 83735 ASSAY OF MAGNESIUM: CPT | Mod: HCNC | Performed by: INTERNAL MEDICINE

## 2025-07-23 PROCEDURE — 86606 ASPERGILLUS ANTIBODY: CPT | Mod: HCNC | Performed by: INTERNAL MEDICINE

## 2025-07-23 PROCEDURE — 86403 PARTICLE AGGLUT ANTBDY SCRN: CPT | Mod: HCNC | Performed by: INTERNAL MEDICINE

## 2025-07-23 PROCEDURE — 99214 OFFICE O/P EST MOD 30 MIN: CPT | Mod: HCNC,GC,, | Performed by: INTERNAL MEDICINE

## 2025-07-23 PROCEDURE — G0378 HOSPITAL OBSERVATION PER HR: HCPCS | Mod: HCNC

## 2025-07-23 PROCEDURE — 87449 NOS EACH ORGANISM AG IA: CPT | Mod: 59,HCNC | Performed by: INTERNAL MEDICINE

## 2025-07-23 PROCEDURE — G0545 PR VISIT INHERENT TO INPT OR OBS CARE, INFECTIOUS DISEASE: HCPCS | Mod: HCNC,,, | Performed by: INTERNAL MEDICINE

## 2025-07-23 PROCEDURE — 87449 NOS EACH ORGANISM AG IA: CPT | Mod: HCNC | Performed by: INTERNAL MEDICINE

## 2025-07-23 PROCEDURE — 25000003 PHARM REV CODE 250: Mod: HCNC | Performed by: INTERNAL MEDICINE

## 2025-07-23 PROCEDURE — 99214 OFFICE O/P EST MOD 30 MIN: CPT | Mod: HCNC,,, | Performed by: NURSE PRACTITIONER

## 2025-07-23 PROCEDURE — 86140 C-REACTIVE PROTEIN: CPT | Mod: HCNC | Performed by: INTERNAL MEDICINE

## 2025-07-23 PROCEDURE — 63600175 PHARM REV CODE 636 W HCPCS: Mod: HCNC | Performed by: INTERNAL MEDICINE

## 2025-07-23 RX ORDER — VALSARTAN 160 MG/1
160 TABLET ORAL 2 TIMES DAILY
Status: DISCONTINUED | OUTPATIENT
Start: 2025-07-23 | End: 2025-07-23 | Stop reason: HOSPADM

## 2025-07-23 RX ADMIN — METOPROLOL TARTRATE 25 MG: 25 TABLET, FILM COATED ORAL at 08:07

## 2025-07-23 RX ADMIN — Medication 400 MG: at 08:07

## 2025-07-23 RX ADMIN — FUROSEMIDE 60 MG: 40 TABLET ORAL at 08:07

## 2025-07-23 RX ADMIN — MECLIZINE HYDROCHLORIDE 25 MG: 25 TABLET ORAL at 08:07

## 2025-07-23 RX ADMIN — GABAPENTIN 300 MG: 300 CAPSULE ORAL at 08:07

## 2025-07-23 RX ADMIN — ATORVASTATIN CALCIUM 80 MG: 40 TABLET, FILM COATED ORAL at 08:07

## 2025-07-23 RX ADMIN — PREDNISONE 5 MG: 5 TABLET ORAL at 08:07

## 2025-07-23 RX ADMIN — ERGOCALCIFEROL 50000 UNITS: 1.25 CAPSULE ORAL at 08:07

## 2025-07-23 RX ADMIN — EMPAGLIFLOZIN 10 MG: 10 TABLET, FILM COATED ORAL at 08:07

## 2025-07-23 RX ADMIN — FERROUS SULFATE TAB 325 MG (65 MG ELEMENTAL FE) 1 EACH: 325 (65 FE) TAB at 08:07

## 2025-07-23 RX ADMIN — HYDRALAZINE HYDROCHLORIDE: 25 TABLET ORAL at 04:07

## 2025-07-23 RX ADMIN — TACROLIMUS 1 MG: 1 CAPSULE ORAL at 08:07

## 2025-07-23 RX ADMIN — DOXAZOSIN 16 MG: 8 TABLET ORAL at 10:07

## 2025-07-23 RX ADMIN — ASPIRIN 81 MG CHEWABLE TABLET 81 MG: 81 TABLET CHEWABLE at 08:07

## 2025-07-23 RX ADMIN — VALSARTAN 160 MG: 160 TABLET, FILM COATED ORAL at 04:07

## 2025-07-23 RX ADMIN — POTASSIUM CHLORIDE 20 MEQ: 1500 TABLET, EXTENDED RELEASE ORAL at 08:07

## 2025-07-23 RX ADMIN — HYDRALAZINE HYDROCHLORIDE: 25 TABLET ORAL at 08:07

## 2025-07-23 RX ADMIN — HYDRALAZINE HYDROCHLORIDE: 25 TABLET ORAL at 03:07

## 2025-07-23 NOTE — CONSULTS
[facility] - Internal Medicine Telemetry  Infectious Disease  Consult Note    Patient Name: Ravi Zamorano  MRN: [id number]  Admission Date: 7/22/2025  Hospital Length of Stay: 1 days  Attending Physician: Hiram Mcgrath MD  Primary Care Provider: Mima Mack MD     Isolation Status: No active isolations    Patient information was obtained from patient and past medical records.      Inpatient consult to Infectious Diseases  Consult performed by: Mckenna Swartz MD  Consult ordered by: Jennifer Coreas MD        Assessment/Plan:     Other  * Febrile illness  69M with history of kidney transplant 2016 on MMF, tacro, pred, T2DM, presents with FUO for the last 4 months with recurrent hospital admissions, most recently last week. Work-up includes negative urine and blood cultures, CMV, RIP. CT CAP notable for mulifocal GGO and nodular airspace opacities, left renal upper pole lesion. Patient requesting to go home today as he is already feeling back to his baseline.    Recommendations:  - Will perform fungal and toxo evaluation, karius testing sent  - Agree with urology eval for nephrectomy as RCC can be associated with fevers  - Will need to send EBV (labs scheduled for 7/31, also would be detected on karius testing)  - Will arrange follow-up in clinic       55 minutes of total time spent on the encounter, which includes face to face time and non-face to face time preparing to see the patient (eg, review of tests), obtaining and reviewing separately obtained history, documenting clinical information in the electronic or other health record, independently interpreting results (not separately reported) and communicating results to the patient/family/caregiver, and care coordination (not separately reported).     Thank you for your consult. I will sign off. Please contact us if you have any additional questions.    Mckenna Swartz MD  Infectious Disease  [facility] - Internal Medicine Telemetry    Subjective:      Principal Problem: Febrile illness    HPI: 69M with history of kidney transplant 2016 on MMF, tacro, pred, T2DM, presents with fevers of unknown origin. Per patient and wife, fevers started 3/2025. He has been admitted multiple times for fevers since March. He was last admitted last week, completed a course of amox-clav and doxycycline for CAP, develop fever while on antibiotics. RIP returned negative. CT CAP 7/13/2025 with multifocal GGO and nodular airspace opacities, more confluent in RUL. Also noted to have ill-defined lesion of left renal upper pole, concerning for RCC. Patient was supposed to see urology today for evaluation, but came to the ED for fevers. Denied having any pets at home. No travel in last 10 years. Used to be a . No international travel. He was started on pip-tazo and vancomycin in ED. Patient is requesting to go home as he is feeling better. Patient denied having any other associated symptoms, no cough, SOB, CP, nvd, abdominal pain, weight loss.     Past Medical History:   Diagnosis Date    Anxiety 07/29/2014    Arthritis     atrial fibrillation     History of cardioversion    Bilateral diabetic retinopathy 2017    Cardioembolic stroke 12/07/2024    almost completely resolved - very mild left hand weakness    CKD (chronic kidney disease)     in transplanted kidney    Colon polyps 2014    Depression - situational 07/29/2014    Diabetes type 2 2000    since 2000.  c/b neuropathy, CKD    Encounter for blood transfusion     Febrile illness 07/22/2025    History of hepatitis C, s/p successful Rx w/ SVR24 - 9/2017 07/29/2014    Genotype 1a, treatment naive 10/2014 liver biopsy - grade 1 / stage 1 Completed Harvoni w/ SVR    Hyperlipidemia 07/29/2014    Hypertension     Hyponatremia 02/20/2025    Pancreatitis     S/P cadaveric kidney transplant 11/5/2016. ESRD secondary to HTN/DMII 11/05/2016    Stroke 2024       Past Surgical History:   Procedure Laterality Date    ABLATION OF  ARRHYTHMOGENIC FOCUS FOR ATRIAL FIBRILLATION N/A 6/11/2024    Procedure: Ablation atrial fibrillation;  Surgeon: Bronson Bowden MD;  Location: [facility] EP LAB;  Service: Cardiology;  Laterality: N/A;  AF, FELICIANO (cx if SR), PVI, RFA, Carto, Gen, GP, 3 Prep    ABLATION OF ARRHYTHMOGENIC FOCUS FOR ATRIAL FIBRILLATION N/A 6/12/2025    Procedure: Ablation atrial fibrillation;  Surgeon: Bronson Bowden MD;  Location: [facility] EP LAB;  Service: Cardiology;  Laterality: N/A;  AF, FLEICIANO (h/o CVA), redo PVI, CTI, RFA, Carto, Gen, GP, 3 Prep *insulin pump*    ABLATION, ATRIAL FLUTTER, ATYPICAL  6/12/2025    Procedure: Ablation, Atrial Flutter, Atypical;  Surgeon: Bronson Bowden MD;  Location: [facility] EP LAB;  Service: Cardiology;;    ABLATION, ATRIAL FLUTTER, TYPICAL  6/11/2024    Procedure: Ablation, Atrial Flutter, Typical;  Surgeon: Bronson Bowden MD;  Location: [facility] EP LAB;  Service: Cardiology;;    ABLATION, ATRIAL FLUTTER, TYPICAL N/A 6/12/2025    Procedure: Ablation, Atrial Flutter, Typical;  Surgeon: Bronson Bowden MD;  Location: [facility] EP LAB;  Service: Cardiology;  Laterality: N/A;    APPENDECTOMY      BONE MARROW BIOPSY Left 6/26/2024    Procedure: Biopsy-bone marrow;  Surgeon: Bridger Zapata MD;  Location: [facility] ENDO ([address] [place] [address]);  Service: Oncology;  Laterality: Left;  6/24-pt knows to hold aspirin and eliquis as instructed by hem/onc, precall complete-Kpvt    CARDIOVERSION  6/11/2024    Procedure: Cardioversion;  Surgeon: Bronson Bowden MD;  Location: [facility] EP LAB;  Service: Cardiology;;    CATARACT EXTRACTION W/  INTRAOCULAR LENS IMPLANT Right 3/13/2024    Procedure: EXTRACTION, CATARACT, WITH IOL INSERTION;  Surgeon: Jennifer Palacio MD;  Location: [facility] OR;  Service: Ophthalmology;  Laterality: Right;    CATARACT EXTRACTION W/  INTRAOCULAR LENS IMPLANT Left 4/10/2024    Procedure: EXTRACTION, CATARACT, WITH IOL INSERTION;  Surgeon: Jennifer Palacio MD;  Location: [facility];  Service: Ophthalmology;  Laterality:  Left;    COLONOSCOPY N/A 12/21/2020    Procedure: COLONOSCOPY;  Surgeon: Tico Bell MD;  [facility]  Service: Endoscopy;  Laterality: N/A;  ok to hold holli per Dr. Valadez, see telephone encounter 11/13/2020-MS  covid test 12/18 [place]    ECHOCARDIOGRAM,TRANSESOPHAGEAL N/A 2/3/2025    Procedure: Transesophageal echo (FELICIANO) intra-procedure log documentation;  Surgeon: Grayson Lin III, MD;  Location: [facility] EP LAB;  Service: Cardiology;  Laterality: N/A;    KIDNEY TRANSPLANT      TRANSESOPHAGEAL ECHOCARDIOGRAPHY N/A 8/26/2019    Procedure: ECHOCARDIOGRAM, TRANSESOPHAGEAL;  Surgeon: [facility] Diagnostic Provider;  Location: [facility] EP LAB;  Service: Cardiology;  Laterality: N/A;    TRANSESOPHAGEAL ECHOCARDIOGRAPHY N/A 6/11/2024    Procedure: ECHOCARDIOGRAM, TRANSESOPHAGEAL;  Surgeon: Grayson Lin III, MD;  Location: [facility] EP LAB;  Service: Cardiology;  Laterality: N/A;    TRANSESOPHAGEAL ECHOCARDIOGRAPHY N/A 6/12/2025    Procedure: ECHOCARDIOGRAM, TRANSESOPHAGEAL;  Surgeon: Breezy Nguyen MD;  Location: [facility] EP LAB;  Service: Cardiology;  Laterality: N/A;    TREATMENT OF CARDIAC ARRHYTHMIA N/A 8/26/2019    Procedure: CARDIOVERSION;  Surgeon: Bronson Bowden MD;  Location: [facility] EP LAB;  Service: Cardiology;  Laterality: N/A;  AF, FELICIANO, DCCV, MAC, GP, 3 PREP    TREATMENT OF CARDIAC ARRHYTHMIA N/A 2/3/2025    Procedure: Cardioversion or Defibrillation;  Surgeon: Bronson Bowden MD;  Location: [facility] EP LAB;  Service: Cardiology;  Laterality: N/A;  AF, FELICIANO, DCCV, MAC, GP, 3 Prep       Review of patient's allergies indicates:   Allergen Reactions    Nifedipine Other (See Comments)     Gingival hyperplasia       Medications:  Facility-Administered Medications Prior to Admission   Medication    epoetin tanya injection 4,000 Units     Medications Prior to Admission   Medication Sig    acetaminophen (TYLENOL) 500 MG tablet Take 500 mg by mouth every 6 (six) hours as needed for Pain.    aspirin 81  mg Tab Take 81 mg by mouth once daily.    atorvastatin (LIPITOR) 80 MG tablet Take 1 tablet (80 mg total) by mouth once daily.    blood sugar diagnostic Strp Use to check blood glucose 1 times daily, to use with insurance preferred meter    blood-glucose sensor (DEXCOM G7 SENSOR) Kayla Change sensor every 10 days.    doxazosin (CARDURA) 8 MG Tab Take 1 tablet (8 mg total) by mouth once daily.    empagliflozin (JARDIANCE) 10 mg tablet Take 1 tablet (10 mg total) by mouth once daily.    ergocalciferol (VITAMIN D2) 50,000 unit Cap Take 1 capsule (50,000 Units total) by mouth every 7 days.    famotidine (PEPCID) 20 MG tablet Take 1 tablet by mouth every evening.    ferrous sulfate (FEOSOL) 325 mg (65 mg iron) Tab tablet Take 1 tablet (325 mg total) by mouth daily with breakfast.    gabapentin (NEURONTIN) 300 MG capsule Take 1 capsule (300 mg total) by mouth 2 (two) times daily.    insulin aspart U-100 (NOVOLOG FLEXPEN U-100 INSULIN) 100 unit/mL (3 mL) InPn pen Use pump. Max daily 100 units    insulin aspart U-100 (NOVOLOG U-100 INSULIN ASPART) 100 unit/mL injection Use in pump, max daily 100 units.    insulin pump cart,auto,BT,G6/7 (OMNIPOD 5 G6-G7 PODS, GEN 5,) Crtg Change every 48 hours.    isosorbide-hydrALAZINE 20-37.5 mg (BIDIL) 20-37.5 mg Tab Take 2 tablets by mouth 3 (three) times daily.    lancets 30 gauge Misc Use to check blood glucose 1 times daily, to use with insurance preferred meter    magnesium oxide (MAG-OX) 400 mg (241.3 mg magnesium) tablet Take 1 tablet (400 mg total) by mouth 2 (two) times daily.    meclizine (ANTIVERT) 25 mg tablet Take 1 tablet (25 mg total) by mouth 3 (three) times daily as needed for Dizziness.    melatonin 3 mg Cap Take 2 capsules by mouth nightly as needed (Insomnia).    metoprolol tartrate (LOPRESSOR) 25 MG tablet Take 1 tablet (25 mg total) by mouth 2 (two) times daily.    mycophenolate (CELLCEPT) 250 mg Cap Take 500 mg by mouth 2 (two) times daily. (Patient not taking:  "Reported on 7/21/2025)    pen needle, diabetic (BD ULTRA-FINE LO PEN NEEDLE) 32 gauge x 5/32" Ndle USE TO ADMINISTER INSULIN 4 TIMES DAILY.    polyethylene glycol (MIRALAX) 17 gram PwPk Take 17 g by mouth daily as needed for Constipation. Take 17 gm (mixed in liquid) by mouth every day.    potassium chloride SA (K-DUR,KLOR-CON) 20 MEQ tablet Take 20 mEq by mouth.    predniSONE (DELTASONE) 5 MG tablet Take 1 tablet (5 mg total) by mouth once daily.    rivaroxaban (XARELTO) 15 mg Tab Take 1 tablet (15 mg total) by mouth daily with dinner or evening meal.    tacrolimus (PROGRAF) 1 MG Cap Take 1 capsule (1 mg total) by mouth every 12 (twelve) hours.    torsemide (DEMADEX) 20 MG Tab Take 2 tablets (40 mg total) by mouth once daily.     Antibiotics (From admission, onward)      None          Antifungals (From admission, onward)      None          Antivirals (From admission, onward)      None             Immunization History   Administered Date(s) Administered    COVID-19, MRNA, LN-S, PF (Pfizer) (Purple Cap) 04/01/2021, 04/22/2021, 10/30/2021, 10/30/2021    COVID-19, mRNA, LNP-S, PF (Moderna) Ages 12+ 11/08/2023    COVID-19, mRNA, LNP-S, PF, naldo-sucrose, 30 mcg/0.3 mL (Pfizer Ages 12+) 09/19/2024    COVID-19, mRNA, LNP-S, bivalent booster, PF (PFIZER OMICRON) 01/04/2023    Hepatitis A, Adult 09/18/2014    Hepatitis B 09/18/2014, 10/21/2014    Hepatitis B, Adult 06/19/2000, 07/24/2000, 01/16/2001    Hepatitis B, Dialysis, 3 Dose 09/18/2014, 10/21/2014    Influenza 10/01/2017    Influenza (FLUAD) - Quadrivalent - Adjuvanted - PF *Preferred* (65+) 11/08/2023    Influenza - Quadrivalent 09/26/2019, 09/26/2019    Influenza - Quadrivalent - PF *Preferred* (6 months and older) 09/15/2017, 09/27/2018, 09/27/2018, 08/14/2020, 08/14/2020, 10/10/2022    Influenza - Trivalent - Afluria, Fluzone MDV 10/01/2015, 09/08/2016, 09/08/2016, 10/09/2021    Influenza - Trivalent - Fluad - Adjuvanted - PF (65 years and older 09/19/2024    " Influenza - Trivalent - Flucelvax - PF 08/10/2014, 08/10/2014    Influenza Split 10/09/2021    Pneumococcal Conjugate - 13 Valent 2014    Pneumococcal Conjugate - 20 Valent 2023    Pneumococcal Polysaccharide - 23 Valent 2014    RSVpreF (Arexvy) 2024    Tdap 2000, 10/21/2014, 09/15/2017    Zoster 2014    Zoster Recombinant 2018, 2018, 10/30/2019       Family History       Problem Relation (Age of Onset)    Cancer Brother    Diabetes Mother    Heart disease Mother, Father    Hypertension Mother, Brother          Social History     Socioeconomic History    Marital status:    Occupational History     Employer: [employer]   Tobacco Use    Smoking status: Former     Current packs/day: 0.00     Average packs/day: 0.5 packs/day for 32.0 years (16.0 ttl pk-yrs)     Types: Cigarettes     Start date: 1984     Quit date: 2016     Years since quittin.6    Smokeless tobacco: Never   Vaping Use    Vaping status: Never Used   Substance and Sexual Activity    Alcohol use: Not Currently    Drug use: No    Sexual activity: Yes     Partners: Female   Social History Narrative     for 14 years     for 38 years    2 children ages 41, 42     Social Drivers of Health     Financial Resource Strain: Low Risk  (2025)    Overall Financial Resource Strain (CARDIA)     Difficulty of Paying Living Expenses: Not hard at all   Food Insecurity: No Food Insecurity (2025)    Hunger Vital Sign     Worried About Running Out of Food in the Last Year: Never true     Ran Out of Food in the Last Year: Never true   Transportation Needs: No Transportation Needs (2025)    PRAPARE - Transportation     Lack of Transportation (Medical): No     Lack of Transportation (Non-Medical): No   Physical Activity: Insufficiently Active (2025)    Exercise Vital Sign     Days of Exercise per Week: 4 days     Minutes of Exercise per Session: 20 min   Stress:  No Stress Concern Present (7/23/2025)    [facility] - Occupational Stress Questionnaire     Feeling of Stress : Not at all   Recent Concern: Stress - Stress Concern Present (7/15/2025)    [facility] of Occupational [facility] - Occupational Stress Questionnaire     Feeling of Stress : To some extent   Housing Stability: Low Risk  (7/23/2025)    Housing Stability Vital Sign     Unable to Pay for Housing in the Last Year: No     Number of Times Moved in the Last Year: 0     Homeless in the Last Year: No     Review of Systems   Constitutional:  Positive for fever. Negative for chills and diaphoresis.   HENT:  Negative for rhinorrhea and sore throat.    Respiratory:  Negative for cough and shortness of breath.    Cardiovascular:  Negative for chest pain and leg swelling.   Gastrointestinal:  Negative for abdominal pain, diarrhea, nausea and vomiting.   Genitourinary:  Negative for dysuria and hematuria.   Musculoskeletal:  Negative for arthralgias and myalgias.   Skin:  Negative for rash.   Neurological:  Negative for headaches.     Objective:     Vital Signs (Most Recent):  Temp: 97.6 °F (36.4 °C) (07/23/25 1549)  Pulse: (!) 59 (07/23/25 1549)  Resp: 18 (07/23/25 0728)  BP: (!) 169/70 (07/23/25 1549)  SpO2: 98 % (07/23/25 1549) Vital Signs (24h Range):  Temp:  [97.6 °F (36.4 °C)-98.4 °F (36.9 °C)] 97.6 °F (36.4 °C)  Pulse:  [52-72] 59  Resp:  [16-18] 18  SpO2:  [95 %-100 %] 98 %  BP: (168-189)/(70-87) 169/70     Weight: 86.2 kg (190 lb)  Body mass index is 25.07 kg/m².    Estimated Creatinine Clearance: 21.3 mL/min (A) (based on SCr of 3.8 mg/dL (H)).     Physical Exam  Vitals reviewed.   Constitutional:       General: He is not in acute distress.     Appearance: He is well-developed. He is not diaphoretic.   HENT:      Head: Normocephalic and atraumatic.      Nose: Nose normal.   Eyes:      Conjunctiva/sclera: Conjunctivae normal.   Pulmonary:      Effort: Pulmonary effort is normal.  No respiratory distress.      Breath sounds: No wheezing, rhonchi or rales.   Abdominal:      General: Abdomen is flat. There is no distension.      Tenderness: There is no abdominal tenderness. There is no guarding.      Comments: Umbilical hernia, reducible   Musculoskeletal:      Cervical back: Normal range of motion.      Right lower leg: No edema.      Left lower leg: No edema.   Skin:     General: Skin is warm and dry.      Findings: No erythema or rash.   Neurological:      Mental Status: He is alert.   Psychiatric:         Behavior: Behavior normal.          Significant Labs: All pertinent labs within the past 24 hours have been reviewed.    Significant Imaging: I have reviewed all pertinent imaging results/findings within the past 24 hours.

## 2025-07-23 NOTE — ASSESSMENT & PLAN NOTE
Patient has a current diagnosis of Hypertensive emergency with end organ damage evidenced by acute kidney injury which is controlled.  Latest blood pressure and vitals reviewed-   Temp:  [98.4 °F (36.9 °C)-98.7 °F (37.1 °C)]   Pulse:  [58-64]   Resp:  [16-18]   BP: (180-189)/(72-86)   SpO2:  [96 %-98 %] .   Patient currently off IV antihypertensives.   Home meds for hypertension were reviewed and noted below.   holding BB due to pauses on tele    -cannot tolerate CCB   -will increase doxazosin and start hydralazine 100 mg TID and Imdur 60, will increase   -restart ARB since PHUONG is improving

## 2025-07-23 NOTE — ASSESSMENT & PLAN NOTE
Patient has paroxysmal (<7 days) atrial fibrillation. Patient is currently in atrial fibrillation. [id number] Score: 3. The patients heart rate in the last 24 hours is as follows:  Pulse  Min: 58  Max: 64     Antiarrhythmics       Anticoagulants       Plan  - Replete lytes with a goal of K>4, Mg >2  -   - Patient's afib is currently controlled

## 2025-07-23 NOTE — DISCHARGE SUMMARY
"[facility] - Acute Medical [facility] Medicine  Discharge Summary      Patient Name: Ravi Zamorano  MRN: [id number]  MARISOL: [id number]  Patient Class: IP- Inpatient  Admission Date: 7/12/2025  Hospital Length of Stay: 5 days  Discharge Date and Time: 7/18/2025  1:26 PM  Attending Physician: Sho att. providers found   Discharging Provider: Hiram Mcgrath MD  Primary Care Provider: Mima Mack MD  Hospital Medicine Team: [facility] [place] HOSP MED A Hiram Mcgrath MD  Primary Care Team: [facility] MED A    HPI:   Ravi Zamorano is a 69 y.o. male s/p kidney transplant in 2016 on tacro, CKD 4, HFpEF, T2DM with insulin pump use, afib on xarelto and AICD being admitted to hospital medicine for manangement of PHUONG on CKD and CHF exacerbation.  Patient reports increased feelings of lightheadedness/dizziness with leaning forward the past several days. Endorses associated unsteady gait and bilateral "hand twitching". During the encounter he seems to have a difficult time explaining his symptoms or their duration. Denies any recent illness, cough, congestion, N/V/D, abdominal pain, dysuria or hematuria. Febrile to 101.4 F on admission, when asked if having recent fevers he says he often has fevers to 101 F at home but is unable to tell me the most recent time this has happened. Sates he does not know what his current home medication regimen is.       In ED: hypertensive to 230/98, febrile to Tmax 101.4 F, remaining vitals stable. CBC without leukocytosis, hgb 8.5 which is baseline. CMP notable for PHUONG on CKD (creatinine 3.9, most recent baseline ~3.1). BNP elevated to 4,888 (elevated from most recent 3,164 6 days ago). Lactic acid and procal wnl. UA non infectious. CT head without acute intracranial abnormality. CXR with cardiomegaly with interstitial edema. Given 1 0 mg IV hydralazine x1, 20 mg IV hydralazine x1 and 80 mg IV lasix in the ED. Admitted to hospital medicine for further management.     * No surgery found *  "     Hospital Course:   While on the floor patient remained hypertensive. KTM and ID consulted as well as Nephrology. Continued on IV diuresis for concerns of Cardiorenal with improvement in Cr. On IV antibiotics per ID recs with Cefepime and Flagyl. CT C/A/P notable for aspiration PNA as well as concerns for renal mass. Dedicated Renal US ordered and notable for new renal lesion on the left kidney concerning for renal cell carcinoma. Urology recommending outpatient follow up for Uro Onc clinic. KTM recommending Pulm consult for recurrent PNA. Esophagram ordered given history of recurrent PNA. Cr improved and diuresis decreased to PO. Hypertensive throughout hospitalization. ARB restarted since Cr stable. Had concerns of pauses on tele and AV block. Discussed with EP. Hold BB at discharge. No third deg av block on EKG or seen when reviewing tele with EP. Had fever on 7/16, discussed with ID. Will continue to monitor now and can consider re consulting if fever persist.     Was afebrile fro 48 hours, certainly the fevers may be due to possible malignancy, this was discussed with him and wife. Will need f/u for possible nephrectomy.            Goals of Care Treatment Preferences:  Code Status: Full Code         Consults:   Consults (From admission, onward)          Status Ordering Provider     Inpatient consult to Pulmonology  Once        Provider:  (Not yet assigned)    Completed NATY WONG     Inpatient consult to Infectious Diseases  Once        Provider:  (Not yet assigned)    Completed NATY WONG     Inpatient consult to Endocrinology  Once        Provider:  (Not yet assigned)    Completed CARO BENAVIDES     Inpatient consult to Nephrology  Once        Provider:  (Not yet assigned)    Completed CARO BENAVIDES     Inpatient consult to Kidney/Pancreas Transplant Medicine  Once        Provider:  (Not yet assigned)    Completed CARO BENAVIDES            Assessment & Plan  Acute on chronic  diastolic congestive heart failure   - CHF pathway initiated   - CXR revealed Cardiomegaly with interstitial edema,   - BNP 4,888 / troponin 205>>220  - EKG with atrial fibrillation recurrence (recent ablation 04/25)  - last echo showing ejection fraction of 50 - 55%.   - repeat TTE ordered with 50-55% and grade III diastolic dysfunction. Elevated pulmonary artery pressure   -  de escalate to PO lasix 60 mg BID   - strict Is/Os   - cardiac, low sodium diet  - fluid restrict 1.5L while inpatient  - daily CMP   - monitor on cardiac telemetry   - supplemental O2 as needed   Community acquired bacterial pneumonia  Fever   Unclear etiology, patient without symptoms to guide possible cause  - febrile to 101.4F on admission  - CBC without leukocytosis  - CT head without acute process  -CT C/A/P concerning for asp vs multifocal PNA and renal mass    -possible component of malignancy causing fevers   -will continue Doxycycline and Augmentin   -no further episodes of fever   -Pulm consulted for recurrent asp PNA. Esophagram ordered.   -Cleared by ST without swallowing issues   -another fever 7/16-discussed with ID. Will continue to monitor at this time. (Possibly multifactorial given concern of malignancy)      Hypertensive emergency  Patient has a current diagnosis of Hypertensive emergency with end organ damage evidenced by acute kidney injury which is controlled.  Latest blood pressure and vitals reviewed-   Temp:  [98.4 °F (36.9 °C)-98.7 °F (37.1 °C)]   Pulse:  [58-64]   Resp:  [16-18]   BP: (180-189)/(72-86)   SpO2:  [96 %-98 %] .   Patient currently off IV antihypertensives.   Home meds for hypertension were reviewed and noted below.   holding BB due to pauses on tele    -cannot tolerate CCB   -will increase doxazosin and start hydralazine 100 mg TID and Imdur 60, will increase   -restart ARB since PHUONG is improving     Left renal mass  -noted on CT A/P and on dedicated Retroperitoneal US  -discussed with Urology. Will  arrange outpatient Uro Onc clinic follow up     Acute kidney injury superimposed on stage 4 chronic kidney disease  CKD IV (severe)  Suspect etiology cardiorenal.    - Cr 3.9.  Baseline Cr 3.1   -CR close to baseline   - UA unremarkable   - renal transplant US ordered. Renal US with renal mass   - nephrology consulted, appreciate recommendations   - Monitor UOP and follow serial BMP   - Adjust drugs to GFR/CrCL; avoid nephrotoxic drugs   -  Estimated Creatinine Clearance: 20.3 mL/min (A) (based on SCr of 4 mg/dL (H)).   - Monitor electrolytes, phos levels daily  -improving with diuresis and BP control   Type 2 diabetes mellitus with stage 4 chronic kidney disease, with long-term current use of insulin  Insulin pump in place  - Patient's FSGs are controlled on current hypoglycemics.   - Last A1c reviewed- 6.5  Maintain anti-hyperglycemic dose as follows-   - endocrine consulted for insulin pump management. On home insulin pump     Hyperlipidemia associated with type 2 diabetes mellitus  - continue statin  S/P cadaveric kidney transplant 11/5/2016. ESRD secondary to HTN/DMII  Immunocompromised state due to drug therapy  Long-term use of immunosuppressant medication  - continue home tacro 1 mg BID  - KTM consulted  - daily BMP, daily tacro levels   Paroxysmal atrial fibrillation  Long term (current) use of anticoagulants  Patient has paroxysmal (<7 days) atrial fibrillation. Patient is currently in atrial fibrillation. [id number] Score: 3. The patients heart rate in the last 24 hours is as follows:  Pulse  Min: 58  Max: 64  Antiarrhythmics     Anticoagulants       Plan  - Replete lytes with a goal of K>4, Mg >2  - Patient is anticoagulated, see medications listed above.  - Patient's afib is currently controlled  - hold bb given concerns of latha cardia and pauses on tele. Will follow up with cardiology outpatient.   Thrombocytopenia, unspecified  The patients 3 most recent labs are listed below.  Recent Labs      07/22/25  1610        Plan  - Will transfuse if platelet count is <10k.  - daily CBC   Anemia of chronic disease  Anemia is likely due to chronic disease due to Chronic Kidney Disease. Most recent hemoglobin and hematocrit are listed below.  Recent Labs     07/22/25  1610   HGB 8.5*   HCT 27.6*     Plan  - Monitor serial CBC: Daily  - Transfuse PRBC if patient becomes hemodynamically unstable, symptomatic or H/H drops below 7/21.  - Patient has not received any PRBC transfusions to date  - Patient's anemia is currently stable  History of CVA (cerebrovascular accident)  - noted  - continue home statin and asa   Hypokalemia  Patient's most recent potassium results are listed below.   Recent Labs     07/22/25  1610   K 3.4*     Plan  - Replete potassium per protocol  - Monitor potassium Daily  - Patient's hypokalemia is stable  Hypertension associated with stage 4 chronic kidney disease due to type 2 diabetes mellitus  Uncontrolled on admission  - as above   - vitals q4h   PVD (peripheral vascular disease)  -ABIs ordered  -pulses on doppler found. No acute concerns     Hypercoagulable state due to paroxysmal atrial fibrillation  Patient has paroxysmal (<7 days) atrial fibrillation. Patient is currently in atrial fibrillation. [id number] Score: 3. The patients heart rate in the last 24 hours is as follows:  Pulse  Min: 58  Max: 64     Antiarrhythmics       Anticoagulants       Plan  - Replete lytes with a goal of K>4, Mg >2  -   - Patient's afib is currently controlled        Pleural effusion      Pneumonia  -noted on CT findings   -continue Flagyl and Rocephin   -pulm consulted per ktm recs for recurrent PNA     Final Active Diagnoses:    Diagnosis Date Noted POA    PRINCIPAL PROBLEM:  Acute on chronic diastolic congestive heart failure [I50.33] 06/03/2025 Yes    PVD (peripheral vascular disease) [I73.9] 07/15/2025 Yes    Pneumonia [J18.9] 07/14/2025 Yes    Left renal mass [N28.89] 07/14/2025 Yes    Hypertension  associated with stage 4 chronic kidney disease due to type 2 diabetes mellitus [E11.22, I12.9, N18.4] 07/13/2025 Yes    Community acquired bacterial pneumonia [J15.9] 07/13/2025 Yes    Hypertensive emergency [I16.1] 07/13/2025 Yes    Paroxysmal atrial fibrillation [I48.0] 06/12/2025 Yes    Long-term use of immunosuppressant medication [Z79.60] 06/06/2025 Not Applicable    Acute kidney injury superimposed on stage 4 chronic kidney disease [N17.9, N18.4] 06/03/2025 Yes    Insulin pump in place [Z96.41] 05/06/2025 Not Applicable    Pleural effusion [J90] 05/02/2025 Yes    Hypokalemia [E87.6] 02/20/2025 Yes    History of CVA (cerebrovascular accident) [Z86.73] 01/30/2025 Not Applicable    Long term (current) use of anticoagulants [Z79.01] 07/10/2024 Not Applicable    Anemia of chronic disease [D63.8] 06/26/2024 Yes    Thrombocytopenia, unspecified [D69.6] 04/01/2024 Yes    Hypercoagulable state due to paroxysmal atrial fibrillation [D68.69, I48.0] 08/09/2019 Yes    CKD IV (severe) [N18.4] 12/28/2016 Yes    Immunocompromised state due to drug therapy [D84.821, Z79.899] 11/07/2016 Not Applicable    S/P cadaveric kidney transplant 11/5/2016. ESRD secondary to HTN/DMII [Z94.0] 11/05/2016 Not Applicable    Hyperlipidemia associated with type 2 diabetes mellitus [E11.69, E78.5] 07/29/2014 Yes    Type 2 diabetes mellitus with stage 4 chronic kidney disease, with long-term current use of insulin [E11.22, N18.4, Z79.4] 07/29/2014 Not Applicable      Problems Resolved During this Admission:    Diagnosis Date Noted Date Resolved POA    Hyponatremia [E87.1] 02/20/2025 07/14/2025 Yes       Discharged Condition: good    [place] [facility] [place]:   Follow-up Information       ProviderKenneth .    Specialty: Internal Medicine  Contact information:  [address] [facility]  [address]  [place] [phone]                           Patient Instructions:      Ambulatory referral/consult to [facility] Acute Care at Home  "  Standing Status: Future   Referral Priority: Routine Referral Type: Consultation   Referred to Provider: PRABHU PROVIDER Requested Specialty: Internal Medicine   Number of Visits Requested: 1     Ambulatory referral/consult to Urology   Standing Status: Future   Referral Priority: Routine Referral Type: Consultation   Referral Reason: Specialty Services Required   Requested Specialty: Urology   Number of Visits Requested: 1       Significant Diagnostic Studies: Labs: BMP:   Recent Labs   Lab 07/22/25  1610   *   *   K 3.4*      CO2 22*   BUN 70*   CREATININE 4.0*   CALCIUM 9.1   MG 1.7       Pending Diagnostic Studies:       None           Medications:  Reconciled Home Medications:      Medication List        CONTINUE taking these medications      acetaminophen 500 MG tablet  Commonly known as: TYLENOL  Take 500 mg by mouth every 6 (six) hours as needed for Pain.     aspirin 81 mg Tab  Take 81 mg by mouth once daily.     atorvastatin 80 MG tablet  Commonly known as: LIPITOR  Take 1 tablet (80 mg total) by mouth once daily.     BD ULTRA-FINE LO PEN NEEDLE 32 gauge x 5/32" Ndle  Generic drug: pen needle, diabetic  USE TO ADMINISTER INSULIN 4 TIMES DAILY.     DEXCOM G7 SENSOR Kayla  Generic drug: blood-glucose sensor  Change sensor every 10 days.     doxazosin 8 MG Tab  Commonly known as: CARDURA  Take 1 tablet (8 mg total) by mouth once daily.     famotidine 20 MG tablet  Commonly known as: PEPCID  Take 1 tablet by mouth every evening.     FeroSuL 325 mg (65 mg iron) Tab tablet  Generic drug: ferrous sulfate  Take 1 tablet (325 mg total) by mouth daily with breakfast.     gabapentin 300 MG capsule  Commonly known as: NEURONTIN  Take 1 capsule (300 mg total) by mouth 2 (two) times daily.     isosorbide-hydrALAZINE 20-37.5 mg 20-37.5 mg Tab  Commonly known as: BIDIL  Take 2 tablets by mouth 3 (three) times daily.     JARDIANCE 10 mg tablet  Generic drug: empagliflozin  Take 1 tablet (10 mg total) " by mouth once daily.     magnesium oxide 400 mg (241.3 mg magnesium) tablet  Commonly known as: MAG-OX  Take 1 tablet (400 mg total) by mouth 2 (two) times daily.     meclizine 25 mg tablet  Commonly known as: ANTIVERT  Take 1 tablet (25 mg total) by mouth 3 (three) times daily as needed for Dizziness.     melatonin 3 mg Cap  Take 2 capsules by mouth nightly as needed (Insomnia).     metoprolol tartrate 25 MG tablet  Commonly known as: LOPRESSOR  Take 1 tablet (25 mg total) by mouth 2 (two) times daily.     MIRALAX 17 gram Pwpk  Generic drug: polyethylene glycol  Take 17 g by mouth daily as needed for Constipation. Take 17 gm (mixed in liquid) by mouth every day.     mycophenolate 250 mg Cap  Commonly known as: CELLCEPT  Take 500 mg by mouth 2 (two) times daily.     * NovoLOG Flexpen U-100 Insulin 100 unit/mL (3 mL) Inpn pen  Generic drug: insulin aspart U-100  Use pump. Max daily 100 units     * NovoLOG U-100 Insulin aspart 100 unit/mL injection  Generic drug: insulin aspart U-100  Use in pump, max daily 100 units.     OMNIPOD 5 G6-G7 PODS (GEN 5) Crtg  Generic drug: insulin pump cart,auto,BT,G6/7  Change every 48 hours.     predniSONE 5 MG tablet  Commonly known as: DELTASONE  Take 1 tablet (5 mg total) by mouth once daily.     tacrolimus 1 MG Cap  Commonly known as: PROGRAF  Take 1 capsule (1 mg total) by mouth every 12 (twelve) hours.     torsemide 20 MG Tab  Commonly known as: DEMADEX  Take 2 tablets (40 mg total) by mouth once daily.     TRUE METRIX GLUCOSE TEST STRIP Strp  Generic drug: blood sugar diagnostic  Use to check blood glucose 1 times daily, to use with insurance preferred meter     TRUEPLUS LANCETS 30 gauge Misc  Generic drug: lancets  Use to check blood glucose 1 times daily, to use with insurance preferred meter     VITAMIN D2 1,250 mcg (50,000 unit) capsule  Generic drug: ergocalciferol  Take 1 capsule (50,000 Units total) by mouth every 7 days.     XARELTO 15 mg Tab  Generic drug:  rivaroxaban  Take 1 tablet (15 mg total) by mouth daily with dinner or evening meal.           * This list has 2 medication(s) that are the same as other medications prescribed for you. Read the directions carefully, and ask your doctor or other care provider to review them with you.                ASK your doctor about these medications      levoFLOXacin 500 MG tablet  Commonly known as: LEVAQUIN  Take 1 tablet (500 mg total) by mouth once daily. for 2 days  Ask about: Should I take this medication?              Indwelling Lines/Drains at time of discharge:   Lines/Drains/Airways       Line  Duration                  Subcutaneous Infusion Pump 07/15/25 Abdomen (Comment) 7 days                        Time spent on the discharge of patient: 15 minutes         Hiram Mcgrath MD  Department of Hospital Medicine  [facility] - Acute Medical Stepdown

## 2025-07-23 NOTE — CONSULTS
[facility] - Internal Medicine Telemetry  Endocrinology  Diabetes Consult Note    Consult Requested by: Hiram Mcgrath MD   Reason for admit: Febrile illness    HISTORY OF PRESENT ILLNESS:  Reason for Consult: Management of T2DM, Hyperglycemia      Diabetes diagnosis year:       Home Diabetes Medications:  Patient takes Farxiga 10 mg QD)  OMNIPOD 5  0.6 u/hr mn-0600 , 9p-mn, 0.7 u/hr 6a-9p   Target 120-130 mg/dl   Iob 3 hours  Max bolus 20 units   Max basal 2 u/hr   Isf 40 mn-mn  ICR 1 to 7.5      Presets:  Snack: 4 units (28g)  Small Meal: 8 units (56g)  Medium Meal: 12 units (90g)  Large Meal: 15 units (114g)  Super Large Meal: 18 units (130g)     How often checking glucose at home? >4 x day   BG readings on regimen: 150s  Hypoglycemia on the regimen?  No  Missed doses on regimen?  No     Diabetes Complications include:     Hyperglycemia     Complicating diabetes co morbidities:   S/p Kidney tx 2016         HPI:70 yo with history of kidney transplant in 2016 on mycophenolate, tacrolimus and prednisone. Patient developed a fever of 102.2 as well as weakness and lightheadedness. Sepsis work-up unremarkable per primary note. Endocrine consulted to manage hyperglycemia, type 2 diabetes, and insulin pump therapy  in the context of infection and the inpatient setting.     Lab Results   Component Value Date    HGBA1C 6.5 (H) 2025         Interval HPI:   Overnight events: No acute events overnight. Patient in room 1125/1125 A. Blood glucose stable. BG at goal on current insulin regimen (Home Insulin Pump). Steroid use- Prednisone 5 mg.    Renal function- Abnormal - Creatinine 3.8   Vasopressors-  None       Endocrine will continue to follow and manage insulin orders inpatient.         Diet Low Sodium (2 gm) Fluid - 1500mL     Eatin%  Nausea: No  Hypoglycemia and intervention: No  Fever: No  TPN and/or TF: No    PMH, PSH, FH, SH updated and reviewed     ROS:  Review of Systems   Constitutional:  Negative  for unexpected weight change.   Eyes:  Negative for visual disturbance.   Respiratory:  Negative for cough.    Cardiovascular:  Negative for chest pain.   Gastrointestinal:  Negative for nausea and vomiting.   Endocrine: Negative for polydipsia and polyuria.   Musculoskeletal:  Negative for back pain.   Skin:  Negative for rash.   Neurological:  Negative for syncope.   Psychiatric/Behavioral:  Negative for agitation and dysphoric mood.        Current Medications and/or Treatments Impacting Glycemic Control  Immunotherapy:    Immunosuppressants           Stop Route Frequency     tacrolimus capsule 1 mg         -- Oral 2 times daily          Steroids:   Hormones (From admission, onward)      Start     Stop Route Frequency Ordered    07/23/25 0900  predniSONE tablet 5 mg         -- Oral Daily 07/22/25 1926    07/22/25 1922  melatonin tablet 6 mg         -- Oral Nightly PRN 07/22/25 1926          Pressors:    Autonomic Drugs (From admission, onward)      None          Hyperglycemia/Diabetes Medications:   Antihyperglycemics (From admission, onward)      Start     Stop Route Frequency Ordered    07/23/25 0900  empagliflozin (Jardiance) tablet 10 mg        Question Answer Comment   Does this patient have a diagnosis of heart failure? Yes    Does this patient have type 1 diabetes or diabetic ketoacidosis? No    Does this patient have symptomatic hypotension? No    Is the patient NPO or pending major surgery in next 3 days or less? No        -- Oral Daily 07/22/25 2323    07/22/25 1930  insulin aspart U-100 insulin pump from home        Question Answer Comment   Target number 110    Basal Rate #1 0.6    Basal rate #1 time 0000        -- SubQ Continuous 07/22/25 1926    07/22/25 1922  insulin aspart U-100 insulin pump from home 0-10 Units        Question Answer Comment   Target number 110    Carbohydrate coverage #1 7.5    Carbohydrate coverage #1 time 0000    Sensitivity #1 40    Sensitivity #1 time 0000        -- SubQ As  "needed (PRN) 07/22/25 1926             PHYSICAL EXAMINATION:  Vitals:    07/23/25 1014   BP: (!) 168/77   Pulse:    Resp:    Temp:      Body mass index is 25.07 kg/m².     Physical Exam  Constitutional:       Appearance: He is well-developed.   HENT:      Head: Normocephalic.   Eyes:      Conjunctiva/sclera: Conjunctivae normal.   Pulmonary:      Effort: Pulmonary effort is normal.   Musculoskeletal:         General: Normal range of motion.   Skin:     General: Skin is warm.      Findings: No rash.   Neurological:      Mental Status: He is alert and oriented to person, place, and time.            Labs Reviewed and Include   Recent Labs   Lab 07/23/25  0426   GLU 92   CALCIUM 9.0      K 3.7   CO2 20*      BUN 70*   CREATININE 3.8*     Lab Results   Component Value Date    WBC 7.32 07/23/2025    HGB 8.6 (L) 07/23/2025    HCT 28.0 (L) 07/23/2025    MCV 69 (L) 07/23/2025     07/23/2025     No results for input(s): "TSH", "FREET4" in the last 168 hours.  Lab Results   Component Value Date    HGBA1C 6.5 (H) 07/13/2025       Nutritional status:   Body mass index is 25.07 kg/m².  Lab Results   Component Value Date    ALBUMIN 2.6 (L) 07/22/2025    ALBUMIN 3.1 (L) 07/12/2025    ALBUMIN 3.4 (L) 07/07/2025     No results found for: "PREALBUMIN"    Estimated Creatinine Clearance: 21.3 mL/min (A) (based on SCr of 3.8 mg/dL (H)).    Accu-Checks  Recent Labs     07/22/25  2239 07/23/25  0301 07/23/25  0724 07/23/25  1103 07/23/25  1549   POCTGLUCOSE 130* 99 102 118* 115*        ASSESSMENT and PLAN    Endocrine  Insulin pump in place  At time of evaluation, pt meets criteria to continue home insulin pump usage.  - Has all adequate supplies   - Bolus settings reviewed    - No changes to home regimen.   - Nurse to check BG qac/hs/0200 & record in epic   - Patient to input glucose into pump and use bolus wizard for prandial needs   - Will continue to monitor accuchecks and titrate insulin as clinically indicated .   "   - Discussed above plan with patient, patient verbalized understanding.   - Understands in case of pump malfunction or cognitive decline in which pt can no longer safely use insulin pump, will transition to SC MDI        If pump malfunctions or is disconnected, please notify the on-call provider for endocrinology.       Type 2 diabetes mellitus with stage 4 chronic kidney disease, with long-term current use of insulin  Endocrinology consulted for BG management.   BG goal 140-180     - Home insulin pump   - BG checks /HS/0200  - Hypoglycemia protocol in place    ** Please notify Endocrine for any change and/or advance in diet**  ** Please call Endocrine for any BG related issues **    Discharge Planning:   TBD. Please notify endocrinology prior to discharge.      Other  * Febrile illness  Infection may elevate BG readings  On IV antibiotics  Managed per primary team  Avoid hypoglycemia            Plan discussed with patient, family, and RN at bedside.        Cecil Villalpando, DNP, FNP  Endocrinology  [facility] - Internal Medicine Telemetry

## 2025-07-23 NOTE — HPI
Reason for Consult: Management of T2DM, Hyperglycemia      Diabetes diagnosis year: 2000      Home Diabetes Medications:  Patient takes Farxiga 10 mg QD)  OMNIPOD 5  0.6 u/hr mn-0600 , 9p-mn, 0.7 u/hr 6a-9p   Target 120-130 mg/dl   Iob 3 hours  Max bolus 20 units   Max basal 2 u/hr   Isf 40 mn-mn  ICR 1 to 7.5      Presets:  Snack: 4 units (28g)  Small Meal: 8 units (56g)  Medium Meal: 12 units (90g)  Large Meal: 15 units (114g)  Super Large Meal: 18 units (130g)     How often checking glucose at home? >4 x day   BG readings on regimen: 150s  Hypoglycemia on the regimen?  No  Missed doses on regimen?  No     Diabetes Complications include:     Hyperglycemia     Complicating diabetes co morbidities:   S/p Kidney tx 2016         HPI:68 yo with history of kidney transplant in 2016 on mycophenolate, tacrolimus and prednisone. Patient developed a fever of 102.2 as well as weakness and lightheadedness. Sepsis work-up unremarkable per primary note. Endocrine consulted to manage hyperglycemia, type 2 diabetes, and insulin pump therapy  in the context of infection and the inpatient setting.     Lab Results   Component Value Date    HGBA1C 6.5 (H) 07/13/2025

## 2025-07-23 NOTE — SUBJECTIVE & OBJECTIVE
Interval History: New admit, certainly this maybe tumour fever, has f/u with urology, not sure if further w/u is needed. ID consult is pending.    Review of Systems  Objective:     Vital Signs (Most Recent):  Temp: 98.1 °F (36.7 °C) (07/23/25 0728)  Pulse: (!) 58 (07/23/25 0728)  Resp: 18 (07/23/25 0728)  BP: (!) 168/77 (07/23/25 1014)  SpO2: 97 % (07/23/25 0728) Vital Signs (24h Range):  Temp:  [97.8 °F (36.6 °C)-98.7 °F (37.1 °C)] 98.1 °F (36.7 °C)  Pulse:  [52-72] 58  Resp:  [16-18] 18  SpO2:  [95 %-100 %] 97 %  BP: (168-189)/(72-87) 168/77     Weight: 86.2 kg (190 lb)  Body mass index is 25.07 kg/m².    Intake/Output Summary (Last 24 hours) at 7/23/2025 1031  Last data filed at 7/23/2025 0431  Gross per 24 hour   Intake 400 ml   Output 350 ml   Net 50 ml         Physical Exam  Constitutional:       Appearance: Normal appearance.   HENT:      Head: Normocephalic and atraumatic.      Nose: Nose normal.      Mouth/Throat:      Mouth: Mucous membranes are moist.   Eyes:      Extraocular Movements: Extraocular movements intact.      Pupils: Pupils are equal, round, and reactive to light.   Cardiovascular:      Rate and Rhythm: Normal rate and regular rhythm.      Heart sounds: No murmur heard.     No gallop.   Pulmonary:      Effort: Pulmonary effort is normal.      Breath sounds: Normal breath sounds. No wheezing or rales.   Abdominal:      General: Abdomen is flat. Bowel sounds are normal. There is no distension.      Palpations: Abdomen is soft.      Tenderness: There is no abdominal tenderness.   Musculoskeletal:         General: No swelling or tenderness. Normal range of motion.      Cervical back: Normal range of motion and neck supple.      Right lower leg: Edema present.      Left lower leg: Edema present.      Comments: Edema improving   Skin:     General: Skin is warm and dry.      Capillary Refill: Capillary refill takes less than 2 seconds.   Neurological:      General: No focal deficit present.       Mental Status: He is alert. Mental status is at baseline.   Psychiatric:         Mood and Affect: Mood normal.               Significant Labs: All pertinent labs within the past 24 hours have been reviewed.  CBC:   Recent Labs   Lab 07/22/25  1610 07/23/25  0426   WBC 8.24 7.32   HGB 8.5* 8.6*   HCT 27.6* 28.0*    207       Significant Imaging: I have reviewed all pertinent imaging results/findings within the past 24 hours.

## 2025-07-23 NOTE — ASSESSMENT & PLAN NOTE
The patients 3 most recent labs are listed below.  Recent Labs     07/22/25  1610        Plan  - Will transfuse if platelet count is <10k.  - daily CBC

## 2025-07-23 NOTE — ASSESSMENT & PLAN NOTE
69M with history of kidney transplant 2016 on MMF, tacro, pred, T2DM, presents with FUO for the last 4 months with recurrent hospital admissions, most recently last week. Work-up includes negative urine and blood cultures, CMV, RIP. CT CAP notable for mulifocal GGO and nodular airspace opacities, left renal upper pole lesion. Patient requesting to go home today as he is already feeling back to his baseline.    Recommendations:  - Will perform fungal and toxo evaluation, karius testing sent  - Agree with urology eval for nephrectomy as RCC can be associated with fevers  - Will need to send EBV (labs scheduled for 7/31, also would be detected on karius testing)  - Will arrange follow-up in clinic

## 2025-07-23 NOTE — PLAN OF CARE
Problem: Hospitalized Older Adult  Goal: Optimal Cognitive Function  Outcome: Progressing  Goal: Effective Bowel Elimination  Outcome: Progressing  Goal: Optimal Coping  Outcome: Progressing  Goal: Fluid and Electrolyte Balance  Outcome: Progressing  Goal: Optimal Functional Ability  Outcome: Progressing  Goal: Improved Oral Intake  Outcome: Progressing  Goal: Adequate Sleep/Rest  Outcome: Progressing  Goal: Effective Urinary Elimination  Outcome: Progressing       Pt arrived around 2140 for the shift, A&Ox4, on RA. No c/o pain/N/V. BP monitoring,

## 2025-07-23 NOTE — H&P
[facility] - Internal Medicine [facility] Medicine  History & Physical    Patient Name: Ravi Zamorano  MRN: [id number]  Patient Class: IP- Inpatient  Admission Date: 7/22/2025  Attending Physician: Hiram Mcgrath MD   Primary Care Provider: Mima Mack MD         Patient information was obtained from patient, past medical records, and ER records.     Subjective:     Principal Problem:<principal problem not specified>    Chief Complaint:   Chief Complaint   Patient presents with    Fatigue     Patient arrived via EMS for weakness, dizziness and a fever for the past few days. Kidney transplant in 2016.         HPI: 68 yo with history of kidney transplant in 2016 on mycophenolate, tacrolimus and prednisone     Hey Kennedy, this patient needs to come in the hospital for weakness and fever of unclear etiology, in the context of being a kidney transplant recipient in 2016. He was recently discharged 4 days ago for a similar presentation was thought to have aspiration pneumonia he completed a course of antibiotics. He said he was feeling better over the weekend but today developed a fever of 100.2 as well as weakness and lightheadedness. Denies cough, shortness breath, chest pain, abdominal pain, dysuria, frequency, vomiting or diarrhea. Workup thus far is largely unremarkable with no elevated lactate or protocol is chest x-ray to me looks like there is something in the right lower lobe looks little worse than last time to me. Covered him with 1 dose of antibiotics and I think needs to be readmitted for recurrent fevers of unclear etiology. ID was involved last time.    sorry, temp was 102.2    BA    Past Medical History: Patient has a past medical history of Anxiety (07/29/2014), Arthritis, atrial fibrillation, Bilateral diabetic retinopathy (2017), Cardioembolic stroke (12/07/2024), CKD (chronic kidney disease), Colon polyps (2014), Depression - situational (07/29/2014), Diabetes type 2 (2000),  Encounter for blood transfusion, History of hepatitis C, s/p successful Rx w/ SVR24 - 9/2017 (07/29/2014), Hyperlipidemia (07/29/2014), Hypertension, Hyponatremia (02/20/2025), Pancreatitis, S/P cadaveric kidney transplant 11/5/2016. ESRD secondary to HTN/DMII (11/05/2016), and Stroke (2024).    Past Surgical History: Patient has a past surgical history that includes Appendectomy; Kidney transplant; Treatment of cardiac arrhythmia (N/A, 8/26/2019); Transesophageal echocardiography (N/A, 8/26/2019); Colonoscopy (N/A, 12/21/2020); Cataract extraction w/  intraocular lens implant (Right, 3/13/2024); Cataract extraction w/  intraocular lens implant (Left, 4/10/2024); Ablation of arrhythmogenic focus for atrial fibrillation (N/A, 6/11/2024); Cardioversion (6/11/2024); ablation, atrial flutter, typical (6/11/2024); Transesophageal echocardiography (N/A, 6/11/2024); Bone marrow biopsy (Left, 6/26/2024); Treatment of cardiac arrhythmia (N/A, 2/3/2025); echocardiogram,transesophageal (N/A, 2/3/2025); Ablation of arrhythmogenic focus for atrial fibrillation (N/A, 6/12/2025); ablation, atrial flutter, typical (N/A, 6/12/2025); ablation, atrial flutter, atypical (6/12/2025); and Transesophageal echocardiography (N/A, 6/12/2025).    Social History: Patient reports that he quit smoking about 8 years ago. His smoking use included cigarettes. He started smoking about 40 years ago. He has a 16 pack-year smoking history. He has never used smokeless tobacco. He reports that he does not currently use alcohol. He reports that he does not use drugs.    Family History: family history includes Cancer in his brother; Diabetes in his mother; Heart disease in his father and mother; Hypertension in his brother and mother.    Medications:   Prior to Admission medications    Medication Sig   acetaminophen (TYLENOL) 500 MG tablet Take 500 mg by mouth every 6 (six) hours as needed for Pain.   aspirin 81 mg Tab Take 81 mg by mouth once daily.    atorvastatin (LIPITOR) 80 MG tablet Take 1 tablet (80 mg total) by mouth once daily.   blood sugar diagnostic Strp Use to check blood glucose 1 times daily, to use with insurance preferred meter   blood-glucose sensor (DEXCOM G7 SENSOR) Kayla Change sensor every 10 days.   doxazosin (CARDURA) 8 MG Tab Take 1 tablet (8 mg total) by mouth once daily.   empagliflozin (JARDIANCE) 10 mg tablet Take 1 tablet (10 mg total) by mouth once daily.   ergocalciferol (VITAMIN D2) 50,000 unit Cap Take 1 capsule (50,000 Units total) by mouth every 7 days.   famotidine (PEPCID) 20 MG tablet Take 1 tablet by mouth every evening.   ferrous sulfate (FEOSOL) 325 mg (65 mg iron) Tab tablet Take 1 tablet (325 mg total) by mouth daily with breakfast.   gabapentin (NEURONTIN) 300 MG capsule Take 1 capsule (300 mg total) by mouth 2 (two) times daily.   insulin aspart U-100 (NOVOLOG FLEXPEN U-100 INSULIN) 100 unit/mL (3 mL) InPn pen Use pump. Max daily 100 units   insulin aspart U-100 (NOVOLOG U-100 INSULIN ASPART) 100 unit/mL injection Use in pump, max daily 100 units.   insulin pump cart,auto,BT,G6/7 (OMNIPOD 5 G6-G7 PODS, GEN 5,) Crtg Change every 48 hours.   isosorbide-hydrALAZINE 20-37.5 mg (BIDIL) 20-37.5 mg Tab Take 2 tablets by mouth 3 (three) times daily.   lancets 30 gauge Misc Use to check blood glucose 1 times daily, to use with insurance preferred meter   magnesium oxide (MAG-OX) 400 mg (241.3 mg magnesium) tablet Take 1 tablet (400 mg total) by mouth 2 (two) times daily.   meclizine (ANTIVERT) 25 mg tablet Take 1 tablet (25 mg total) by mouth 3 (three) times daily as needed for Dizziness.   melatonin 3 mg Cap Take 2 capsules by mouth nightly as needed (Insomnia).   metoprolol tartrate (LOPRESSOR) 25 MG tablet Take 1 tablet (25 mg total) by mouth 2 (two) times daily.   mycophenolate (CELLCEPT) 250 mg Cap Take 500 mg by mouth 2 (two) times daily.  Patient not taking: Reported on 7/21/2025   pen needle, diabetic (BD ULTRA-FINE  "LO PEN NEEDLE) 32 gauge x 5/32" Ndle USE TO ADMINISTER INSULIN 4 TIMES DAILY.   polyethylene glycol (MIRALAX) 17 gram PwPk Take 17 g by mouth daily as needed for Constipation. Take 17 gm (mixed in liquid) by mouth every day.   potassium chloride SA (K-DUR,KLOR-CON) 20 MEQ tablet Take 20 mEq by mouth.   predniSONE (DELTASONE) 5 MG tablet Take 1 tablet (5 mg total) by mouth once daily.   rivaroxaban (XARELTO) 15 mg Tab Take 1 tablet (15 mg total) by mouth daily with dinner or evening meal.   tacrolimus (PROGRAF) 1 MG Cap Take 1 capsule (1 mg total) by mouth every 12 (twelve) hours.   torsemide (DEMADEX) 20 MG Tab Take 2 tablets (40 mg total) by mouth once daily.   blood-glucose meter Misc Use to check blood glucose 1 times daily, to use with insurance preferred meter   insulin lispro (HUMALOG KWIKPEN INSULIN) 100 unit/mL pen Inject 18 units w/ breakfast, 16 units w/ lunch and dinner plus scale 150-200 +2, 201-250 +4, 251-300 +6, 301-350 +8.       Allergies: Patient is allergic to nifedipine.    ROS    Constitutional: neg for fever or chills  Eyes: neg for visual changes  ENT: neg for nasal congestion or sore throat  Respiratory: neg for cough or shortness of breath  Cardiovascular: neg for chest pain or palpitations  Gastrointestinal: neg for nausea or vomiting or abdominal pain. Neg for change in bowel habits  Genitourinary: neg for hematuria or dysuria  Integument/Breast: neg for rash or pruritis  Hematologic/Lymphatic: neg for easy bruising neg for lymphadenopathy   Musculoskeletal: neg for arthralgias or myalgias  Neurological: neg for seizures or tremors  Endocrine: neg for heat or cold intolerance    PEx  Temp:  [97.8 °F (36.6 °C)-98.7 °F (37.1 °C)]   Pulse:  [55-64]   Resp:  [16-18]   BP: (176-189)/(72-87)   SpO2:  [95 %-100 %]   Body mass index is 25.07 kg/m².     General: well developed, well nourished, neg for acute distress  Eyes: conjunctivae/corneas clear.   Head: normocephalic, atraumatic.   Neck: " supple, symmetrical, trachea midline, neg for JVD, neg for carotid bruits  Lungs: clear to auscultation bilaterally, normal respiratory effort  Heart: regular rate and rhythm, neg for murmur  Extremities: neg for edema, neg for joint swelling, pulses: 2+ at ankles   Abdomen: Soft, non-tender; liver and spleen not palpable, bowel sounds active, neg for aortic bruits  Skin: Skin color, texture, turgor normal. Neg for rashes or lesions.   Neurologic: CN II-XII grossly intact, DTR: 2/4 bilaterally. Normal muscle strength and tone. Neg for focal numbness or weakness  Mental status: Alert, oriented x 4, affect appropriate, memory intact    Recent Labs   Lab 07/17/25  0508 07/18/25  0552 07/22/25  1610   WBC 7.24 8.29 8.24   HGB 9.4* 9.1* 8.5*   HCT 31.0* 29.5* 27.6*    176 214     Recent Labs   Lab 07/17/25  0508 07/18/25  0552 07/22/25  1610   * 135* 133*   K 4.8 3.7 3.4*    104 101   CO2 19* 19* 22*   BUN 63* 62* 70*   CREATININE 3.4* 3.4* 4.0*   * 131* 116*   CALCIUM 8.7 8.6* 9.1   MG 1.8 1.6 1.7   PHOS 3.3 3.4 3.6     Recent Labs   Lab 07/22/25  1610   ALKPHOS 57   ALT 8*   AST 20   ALBUMIN 2.6*   PROT 6.6   BILITOT 0.3      Recent Labs   Lab 07/17/25  0838 07/17/25  1216 07/17/25  1607 07/17/25  2102 07/18/25  0755 07/22/25  2239   POCTGLUCOSE 152* 200* 255* 210* 152* 130*     06/03/2025 6.4 (H) 4.0 - 5.6 % Final     Comment:     ADA Screening Guidelines:  5.7-6.4%  Consistent with prediabetes  >=6.5%  Consistent with diabetes    High levels of fetal hemoglobin interfere with the HbA1C  assay. Heterozygous hemoglobin variants (HbS, HgC, etc)do  not significantly interfere with this assay.   However, presence of multiple variants may affect accuracy.       Assessment & Plan  Febrile illness  Initial work up non-revealing  Blood cultures pending  Given vanc and zosyn in there ER  ID consult  Hold cellcept (has been held since last admission)  Type 2 diabetes mellitus with stage 4 chronic  kidney disease, with long-term current use of insulin  Creatine stable for now. BMP reviewed- noted Estimated Creatinine Clearance: 20.3 mL/min (A) (based on SCr of 4 mg/dL (H)). according to latest data. Based on current GFR, CKD stage is stage 4 - GFR 15-29.  Monitor UOP and serial BMP and adjust therapy as needed. Renally dose meds. Avoid nephrotoxic medications and procedures.    Lab Results   Component Value Date    HGBA1C 6.5 (H) 07/13/2025     Resume insulin drip  Endocrinology consulted  Chronic combined systolic (congestive) and diastolic (congestive) heart failure  Patient has Systolic (HFrEF) heart failure that is Chronic. On presentation their CHF was well compensated. Most recent BNP and echo results are listed below.  Recent Labs     07/22/25  1610   BNP 4,520*     Latest ECHO  Results for orders placed during the hospital encounter of 07/12/25    Echo    Interpretation Summary    Left Ventricle: The left ventricle is normal in size. Mildly increased wall thickness. There is concentric remodeling. Normal wall motion. There is low normal systolic function with a visually estimated ejection fraction of 50 - 55%. Grade III diastolic dysfunction. Elevated left ventricular filling pressure.    Right Ventricle: The right ventricle is normal in size measuring 3.5 cm. Wall thickness is normal. Systolic function is reduced.    Left Atrium: There is a small atrial septal defect in the mid/anterior septum that appears iatrogenic.  There is left to right shunting by color Doppler interrogation.  Velocities suggest that the left atrial pressure is markedly elevated.    Aortic Valve: There is mild aortic regurgitation.    Pulmonary Artery: There is moderate to severe pulmonary hypertension. The estimated pulmonary artery systolic pressure is 67 mmHg.    IVC/SVC: Normal venous pressure at 3 mmHg.    Current Heart Failure Medications  furosemide tablet 60 mg, 2 times daily, Oral  Bidil 10 mg/25 mg ii po TID  Valsartan  160 mg BID   Metoprolol 25 mg BID  Jardiance 10 mg daily  Patient is no longer on entresto per cardiology    Plan  - Monitor strict I&Os and daily weights.    - Place on telemetry  - Low sodium diet  - Place on fluid restriction of 1.5 L.   - Cardiology has not been consulted  - The patient's volume status is at their baseline  S/P cadaveric kidney transplant 11/5/2016. ESRD secondary to HTN/DMII  Prophylactic immunotherapy  Taacrolimus - adjust per KTM  Prednisone 5 mg daily  Cellcept on hold  Paroxysmal atrial fibrillation  Patient has paroxysmal (<7 days) atrial fibrillation. Patient is currently in atrial fibrillation. [id number] Score: 3. The patients heart rate in the last 24 hours is as follows:  Pulse  Min: 55  Max: 72     Antiarrhythmics  metoprolol tartrate (LOPRESSOR) tablet 25 mg, 2 times daily, Oral    Anticoagulants  rivaroxaban tablet 15 mg, With dinner, Oral    Plan  - Replete lytes with a goal of K>4, Mg >2  - Patient is anticoagulated, see medications listed above.  - Patient's afib is currently controlled  Hypertension associated with stage 4 chronic kidney disease due to type 2 diabetes mellitus  Creatine stable for now. BMP reviewed- noted Estimated Creatinine Clearance: 20.3 mL/min (A) (based on SCr of 4 mg/dL (H)). according to latest data. Based on current GFR, CKD stage is stage 4 - GFR 15-29.  Monitor UOP and serial BMP and adjust therapy as needed. Renally dose meds. Avoid nephrotoxic medications and procedures.     Resume doxazosin 16 mg daily, bidil 2 tabs TID, furosemide 60 mg BID, valsartan 160 mg BID, metoprolol 25 mg BID  BPH with urinary obstruction  Doxazosin 16 mg daily  PVD (peripheral vascular disease)  History of CVA (cerebrovascular accident)  ASA 81 mg daily and atorvastatin 80 mg daily  Left kidney mass  Will need to evaluate as outpatient with urology. Need to reschedule is appointment at discharge.  VTE Risk Mitigation (From admission, onward)           Ordered      rivaroxaban tablet 15 mg  With dinner         07/22/25 1926     IP VTE HIGH RISK PATIENT  Once         07/22/25 1926                         Jennifer Coreas MD  Department of Hospital Medicine  [facility] - Internal Medicine Telemetry

## 2025-07-23 NOTE — ASSESSMENT & PLAN NOTE
Patient's most recent potassium results are listed below.   Recent Labs     07/22/25  1610   K 3.4*     Plan  - Replete potassium per protocol  - Monitor potassium Daily  - Patient's hypokalemia is stable

## 2025-07-23 NOTE — HPI
68 yo with history of kidney transplant in 2016 on mycophenolate, tacrolimus and prednisone and DM II on insulin drip came in for fever of 101.2 that started this afternoon. He was recently discharged 4 days ago after being admitted for fever, generalized weakness and dizziness. He was treated with a course of antibiotics for aspiration pneumonia. Cellcept has been held since. He has associated weakness and lightheadedness. Denies cough, shortness breath, chest pain, abdominal pain, dysuria, frequency, vomiting or diarrhea. Normal WBC. Lactate and procal is normal. CXR is read as negative for acute process. UA is negative for infection. He was given zosyn and vanc in the ER. Patient mentions he has been admitted for fevers about five times since the beginning of the year.

## 2025-07-23 NOTE — ASSESSMENT & PLAN NOTE
Patient has paroxysmal (<7 days) atrial fibrillation. Patient is currently in atrial fibrillation. [id number] Score: 3. The patients heart rate in the last 24 hours is as follows:  Pulse  Min: 58  Max: 64  Antiarrhythmics     Anticoagulants       Plan  - Replete lytes with a goal of K>4, Mg >2  - Patient is anticoagulated, see medications listed above.  - Patient's afib is currently controlled  - hold bb given concerns of latha cardia and pauses on tele. Will follow up with cardiology outpatient.

## 2025-07-23 NOTE — HPI
69M with history of kidney transplant 2016 on MMF, tacro, pred, T2DM, presents with fevers of unknown origin. Per patient and wife, fevers started 3/2025. He has been admitted multiple times for fevers since March. He was last admitted last week, completed a course of amox-clav and doxycycline for CAP, develop fever while on antibiotics. RIP returned negative. CT CAP 7/13/2025 with multifocal GGO and nodular airspace opacities, more confluent in RUL. Also noted to have ill-defined lesion of left renal upper pole, concerning for RCC. Patient was supposed to see urology today for evaluation, but came to the ED for fevers. Denied having any pets at home. No travel in last 10 years. Used to be a . No international travel. He was started on pip-tazo and vancomycin in ED. Patient is requesting to go home as he is feeling better. Patient denied having any other associated symptoms, no cough, SOB, CP, nvd, abdominal pain, weight loss.

## 2025-07-23 NOTE — ASSESSMENT & PLAN NOTE
Creatine stable for now. BMP reviewed- noted Estimated Creatinine Clearance: 21.3 mL/min (A) (based on SCr of 3.8 mg/dL (H)). according to latest data. Based on current GFR, CKD stage is stage 4 - GFR 15-29.  Monitor UOP and serial BMP and adjust therapy as needed. Renally dose meds. Avoid nephrotoxic medications and procedures.    Lab Results   Component Value Date    HGBA1C 6.5 (H) 07/13/2025     Resume insulin drip  Endocrinology consulted

## 2025-07-23 NOTE — PROGRESS NOTES
[facility]vind [facility] - Internal Medicine [facility] Medicine  Progress Note    Patient Name: Ravi Zamorano  MRN: [id number]  Patient Class: OP- Observation   Admission Date: 7/22/2025  Length of Stay: 1 days  Attending Physician: Hiram Mcgrath MD  Primary Care Provider: Mima Mack MD        Subjective     Principal Problem:Febrile illness        HPI:  70 yo with history of kidney transplant in 2016 on mycophenolate, tacrolimus and prednisone and DM II on insulin drip came in for fever of 101.2 that started this afternoon. He was recently discharged 4 days ago after being admitted for fever, generalized weakness and dizziness. He was treated with a course of antibiotics for aspiration pneumonia. Cellcept has been held since. He has associated weakness and lightheadedness. Denies cough, shortness breath, chest pain, abdominal pain, dysuria, frequency, vomiting or diarrhea. Normal WBC. Lactate and procal is normal. CXR is read as negative for acute process. UA is negative for infection. He was given zosyn and vanc in the ER. Patient mentions he has been admitted for fevers about five times since the beginning of the year.    Overview/Hospital Course:  No notes on file    Interval History: New admit, certainly this maybe tumour fever, has f/u with urology, not sure if further w/u is needed. ID consult is pending.    Review of Systems  Objective:     Vital Signs (Most Recent):  Temp: 98.1 °F (36.7 °C) (07/23/25 0728)  Pulse: (!) 58 (07/23/25 0728)  Resp: 18 (07/23/25 0728)  BP: (!) 168/77 (07/23/25 1014)  SpO2: 97 % (07/23/25 0728) Vital Signs (24h Range):  Temp:  [97.8 °F (36.6 °C)-98.7 °F (37.1 °C)] 98.1 °F (36.7 °C)  Pulse:  [52-72] 58  Resp:  [16-18] 18  SpO2:  [95 %-100 %] 97 %  BP: (168-189)/(72-87) 168/77     Weight: 86.2 kg (190 lb)  Body mass index is 25.07 kg/m².    Intake/Output Summary (Last 24 hours) at 7/23/2025 1031  Last data filed at 7/23/2025 0431  Gross per 24 hour   Intake 400 ml   Output  350 ml   Net 50 ml         Physical Exam  Constitutional:       Appearance: Normal appearance.   HENT:      Head: Normocephalic and atraumatic.      Nose: Nose normal.      Mouth/Throat:      Mouth: Mucous membranes are moist.   Eyes:      Extraocular Movements: Extraocular movements intact.      Pupils: Pupils are equal, round, and reactive to light.   Cardiovascular:      Rate and Rhythm: Normal rate and regular rhythm.      Heart sounds: No murmur heard.     No gallop.   Pulmonary:      Effort: Pulmonary effort is normal.      Breath sounds: Normal breath sounds. No wheezing or rales.   Abdominal:      General: Abdomen is flat. Bowel sounds are normal. There is no distension.      Palpations: Abdomen is soft.      Tenderness: There is no abdominal tenderness.   Musculoskeletal:         General: No swelling or tenderness. Normal range of motion.      Cervical back: Normal range of motion and neck supple.      Right lower leg: Edema present.      Left lower leg: Edema present.      Comments: Edema improving   Skin:     General: Skin is warm and dry.      Capillary Refill: Capillary refill takes less than 2 seconds.   Neurological:      General: No focal deficit present.      Mental Status: He is alert. Mental status is at baseline.   Psychiatric:         Mood and Affect: Mood normal.               Significant Labs: All pertinent labs within the past 24 hours have been reviewed.  CBC:   Recent Labs   Lab 07/22/25  1610 07/23/25  0426   WBC 8.24 7.32   HGB 8.5* 8.6*   HCT 27.6* 28.0*    207       Significant Imaging: I have reviewed all pertinent imaging results/findings within the past 24 hours.      Assessment & Plan  Febrile illness  Initial work up non-revealing  Blood cultures pending  Given vanc and zosyn in there ER  ID consult  Hold cellcept (has been held since last admission)  Type 2 diabetes mellitus with stage 4 chronic kidney disease, with long-term current use of insulin  Creatine stable for now.  BMP reviewed- noted Estimated Creatinine Clearance: 21.3 mL/min (A) (based on SCr of 3.8 mg/dL (H)). according to latest data. Based on current GFR, CKD stage is stage 4 - GFR 15-29.  Monitor UOP and serial BMP and adjust therapy as needed. Renally dose meds. Avoid nephrotoxic medications and procedures.    Lab Results   Component Value Date    HGBA1C 6.5 (H) 07/13/2025     Resume insulin drip  Endocrinology consulted  Chronic combined systolic (congestive) and diastolic (congestive) heart failure  Patient has Systolic (HFrEF) heart failure that is Chronic. On presentation their CHF was well compensated. Most recent BNP and echo results are listed below.  Recent Labs     07/22/25  1610   BNP 4,520*     Latest ECHO  Results for orders placed during the hospital encounter of 07/12/25    Echo    Interpretation Summary    Left Ventricle: The left ventricle is normal in size. Mildly increased wall thickness. There is concentric remodeling. Normal wall motion. There is low normal systolic function with a visually estimated ejection fraction of 50 - 55%. Grade III diastolic dysfunction. Elevated left ventricular filling pressure.    Right Ventricle: The right ventricle is normal in size measuring 3.5 cm. Wall thickness is normal. Systolic function is reduced.    Left Atrium: There is a small atrial septal defect in the mid/anterior septum that appears iatrogenic.  There is left to right shunting by color Doppler interrogation.  Velocities suggest that the left atrial pressure is markedly elevated.    Aortic Valve: There is mild aortic regurgitation.    Pulmonary Artery: There is moderate to severe pulmonary hypertension. The estimated pulmonary artery systolic pressure is 67 mmHg.    IVC/SVC: Normal venous pressure at 3 mmHg.    Current Heart Failure Medications  furosemide tablet 60 mg, 2 times daily, Oral  Bidil 10 mg/25 mg ii po TID  Valsartan 160 mg BID   Metoprolol 25 mg BID  Jardiance 10 mg daily  Patient is no  longer on entresto per cardiology    Plan  - Monitor strict I&Os and daily weights.    - Place on telemetry  - Low sodium diet  - Place on fluid restriction of 1.5 L.   - Cardiology has not been consulted  - The patient's volume status is at their baseline  S/P cadaveric kidney transplant 11/5/2016. ESRD secondary to HTN/DMII  Prophylactic immunotherapy  Taacrolimus - adjust per KTM  Prednisone 5 mg daily  Cellcept on hold  Paroxysmal atrial fibrillation  Patient has paroxysmal (<7 days) atrial fibrillation. Patient is currently in atrial fibrillation. [id number] Score: 3. The patients heart rate in the last 24 hours is as follows:  Pulse  Min: 52  Max: 72     Antiarrhythmics  metoprolol tartrate (LOPRESSOR) tablet 25 mg, 2 times daily, Oral    Anticoagulants  rivaroxaban tablet 15 mg, With dinner, Oral    Plan  - Replete lytes with a goal of K>4, Mg >2  - Patient is anticoagulated, see medications listed above.  - Patient's afib is currently controlled  Hypertension associated with stage 4 chronic kidney disease due to type 2 diabetes mellitus  Creatine stable for now. BMP reviewed- noted Estimated Creatinine Clearance: 21.3 mL/min (A) (based on SCr of 3.8 mg/dL (H)). according to latest data. Based on current GFR, CKD stage is stage 4 - GFR 15-29.  Monitor UOP and serial BMP and adjust therapy as needed. Renally dose meds. Avoid nephrotoxic medications and procedures.     Resume doxazosin 16 mg daily, bidil 2 tabs TID, furosemide 60 mg BID, valsartan 160 mg BID, metoprolol 25 mg BID  BPH with urinary obstruction  Doxazosin 16 mg daily  PVD (peripheral vascular disease)  History of CVA (cerebrovascular accident)  ASA 81 mg daily and atorvastatin 80 mg daily  Left kidney mass  Will need to evaluate as outpatient with urology. Need to reschedule is appointment at discharge.  VTE Risk Mitigation (From admission, onward)           Ordered     rivaroxaban tablet 15 mg  With dinner         07/22/25 1926     IP VTE HIGH  RISK PATIENT  Once         07/22/25 1926                    Discharge Planning   UBALDO: 7/25/2025     Code Status: Prior   Medical Readiness for Discharge Date:                            Hiram Mcgrath MD  Department of Hospital Medicine   [facility] - Internal Medicine Telemetry

## 2025-07-23 NOTE — ASSESSMENT & PLAN NOTE
Creatine stable for now. BMP reviewed- noted Estimated Creatinine Clearance: 21.3 mL/min (A) (based on SCr of 3.8 mg/dL (H)). according to latest data. Based on current GFR, CKD stage is stage 4 - GFR 15-29.  Monitor UOP and serial BMP and adjust therapy as needed. Renally dose meds. Avoid nephrotoxic medications and procedures.     Resume doxazosin 16 mg daily, bidil 2 tabs TID, furosemide 60 mg BID, valsartan 160 mg BID, metoprolol 25 mg BID

## 2025-07-23 NOTE — ASSESSMENT & PLAN NOTE
Patient has Systolic (HFrEF) heart failure that is Chronic. On presentation their CHF was well compensated. Most recent BNP and echo results are listed below.  Recent Labs     07/22/25  1610   BNP 4,520*     Latest ECHO  Results for orders placed during the hospital encounter of 07/12/25    Echo    Interpretation Summary    Left Ventricle: The left ventricle is normal in size. Mildly increased wall thickness. There is concentric remodeling. Normal wall motion. There is low normal systolic function with a visually estimated ejection fraction of 50 - 55%. Grade III diastolic dysfunction. Elevated left ventricular filling pressure.    Right Ventricle: The right ventricle is normal in size measuring 3.5 cm. Wall thickness is normal. Systolic function is reduced.    Left Atrium: There is a small atrial septal defect in the mid/anterior septum that appears iatrogenic.  There is left to right shunting by color Doppler interrogation.  Velocities suggest that the left atrial pressure is markedly elevated.    Aortic Valve: There is mild aortic regurgitation.    Pulmonary Artery: There is moderate to severe pulmonary hypertension. The estimated pulmonary artery systolic pressure is 67 mmHg.    IVC/SVC: Normal venous pressure at 3 mmHg.    Current Heart Failure Medications  furosemide tablet 60 mg, 2 times daily, Oral  Bidil 10 mg/25 mg ii po TID  Valsartan 160 mg BID   Metoprolol 25 mg BID  Jardiance 10 mg daily  Patient is no longer on entresto per cardiology    Plan  - Monitor strict I&Os and daily weights.    - Place on telemetry  - Low sodium diet  - Place on fluid restriction of 1.5 L.   - Cardiology has not been consulted  - The patient's volume status is at their baseline

## 2025-07-23 NOTE — SUBJECTIVE & OBJECTIVE
Past Medical History:   Diagnosis Date    Anxiety 07/29/2014    Arthritis     atrial fibrillation     History of cardioversion    Bilateral diabetic retinopathy 2017    Cardioembolic stroke 12/07/2024    almost completely resolved - very mild left hand weakness    CKD (chronic kidney disease)     in transplanted kidney    Colon polyps 2014    Depression - situational 07/29/2014    Diabetes type 2 2000    since 2000.  c/b neuropathy, CKD    Encounter for blood transfusion     Febrile illness 07/22/2025    History of hepatitis C, s/p successful Rx w/ SVR24 - 9/2017 07/29/2014    Genotype 1a, treatment naive 10/2014 liver biopsy - grade 1 / stage 1 Completed Harvoni w/ SVR    Hyperlipidemia 07/29/2014    Hypertension     Hyponatremia 02/20/2025    Pancreatitis     S/P cadaveric kidney transplant 11/5/2016. ESRD secondary to HTN/DMII 11/05/2016    Stroke 2024       Past Surgical History:   Procedure Laterality Date    ABLATION OF ARRHYTHMOGENIC FOCUS FOR ATRIAL FIBRILLATION N/A 6/11/2024    Procedure: Ablation atrial fibrillation;  Surgeon: Bronson Bowden MD;  Location: [facility] EP LAB;  Service: Cardiology;  Laterality: N/A;  AF, FELICIANO (cx if SR), PVI, RFA, Carto, Gen, GP, 3 Prep    ABLATION OF ARRHYTHMOGENIC FOCUS FOR ATRIAL FIBRILLATION N/A 6/12/2025    Procedure: Ablation atrial fibrillation;  Surgeon: Bronson Bowden MD;  Location: [facility] EP LAB;  Service: Cardiology;  Laterality: N/A;  AF, FELICIANO (h/o CVA), redo PVI, CTI, RFA, Carto, Gen, GP, 3 Prep *insulin pump*    ABLATION, ATRIAL FLUTTER, ATYPICAL  6/12/2025    Procedure: Ablation, Atrial Flutter, Atypical;  Surgeon: Bronson Bowden MD;  Location: [facility] EP LAB;  Service: Cardiology;;    ABLATION, ATRIAL FLUTTER, TYPICAL  6/11/2024    Procedure: Ablation, Atrial Flutter, Typical;  Surgeon: Bronson Bowden MD;  Location: [facility] EP LAB;  Service: Cardiology;;    ABLATION, ATRIAL FLUTTER, TYPICAL N/A 6/12/2025    Procedure: Ablation, Atrial Flutter, Typical;  Surgeon:  Bronson Bowden MD;  Location: [facility] EP LAB;  Service: Cardiology;  Laterality: N/A;    APPENDECTOMY      BONE MARROW BIOPSY Left 6/26/2024    Procedure: Biopsy-bone marrow;  Surgeon: Bridger Zapata MD;  Location: [facility] ENDO (4TH FLR);  Service: Oncology;  Laterality: Left;  6/24-pt knows to hold aspirin and eliquis as instructed by hem/onc, precall complete-Kpvt    CARDIOVERSION  6/11/2024    Procedure: Cardioversion;  Surgeon: Bronson Bowden MD;  Location: [facility] EP LAB;  Service: Cardiology;;    CATARACT EXTRACTION W/  INTRAOCULAR LENS IMPLANT Right 3/13/2024    Procedure: EXTRACTION, CATARACT, WITH IOL INSERTION;  Surgeon: Jennifer Palacio MD;  Location: [facility] OR;  Service: Ophthalmology;  Laterality: Right;    CATARACT EXTRACTION W/  INTRAOCULAR LENS IMPLANT Left 4/10/2024    Procedure: EXTRACTION, CATARACT, WITH IOL INSERTION;  Surgeon: Jennifer Palacio MD;  Location: [facility] OR;  Service: Ophthalmology;  Laterality: Left;    COLONOSCOPY N/A 12/21/2020    Procedure: COLONOSCOPY;  Surgeon: Tico Bell MD;  Location: [facility] ENDO (4TH FLR);  Service: Endoscopy;  Laterality: N/A;  ok to hold eliquis per Dr. Valadez, see telephone encounter 11/13/2020-MS  covid test 12/18 Katherine    ECHOCARDIOGRAM,TRANSESOPHAGEAL N/A 2/3/2025    Procedure: Transesophageal echo (FELICIANO) intra-procedure log documentation;  Surgeon: Grayson Lin III, MD;  Location: [facility] EP LAB;  Service: Cardiology;  Laterality: N/A;    KIDNEY TRANSPLANT      TRANSESOPHAGEAL ECHOCARDIOGRAPHY N/A 8/26/2019    Procedure: ECHOCARDIOGRAM, TRANSESOPHAGEAL;  Surgeon: [facility];  Location: [facility] EP LAB;  Service: Cardiology;  Laterality: N/A;    TRANSESOPHAGEAL ECHOCARDIOGRAPHY N/A 6/11/2024    Procedure: ECHOCARDIOGRAM, TRANSESOPHAGEAL;  Surgeon: Grayson Lin III, MD;  Location: [facility] EP LAB;  Service: Cardiology;  Laterality: N/A;    TRANSESOPHAGEAL ECHOCARDIOGRAPHY N/A 6/12/2025    Procedure: ECHOCARDIOGRAM, TRANSESOPHAGEAL;   Surgeon: Breezy Nguyen MD;  Location: [facility] EP LAB;  Service: Cardiology;  Laterality: N/A;    TREATMENT OF CARDIAC ARRHYTHMIA N/A 8/26/2019    Procedure: CARDIOVERSION;  Surgeon: Bronson Bowden MD;  Location: [facility] EP LAB;  Service: Cardiology;  Laterality: N/A;  AF, FELICIANO, DCCV, MAC, GP, 3 PREP    TREATMENT OF CARDIAC ARRHYTHMIA N/A 2/3/2025    Procedure: Cardioversion or Defibrillation;  Surgeon: Bronson Bowden MD;  Location: [facility] EP LAB;  Service: Cardiology;  Laterality: N/A;  AF, FELICIANO, DCCV, MAC, GP, 3 Prep       Review of patient's allergies indicates:   Allergen Reactions    Nifedipine Other (See Comments)     Gingival hyperplasia     Current Facility-Administered Medications   Medication Frequency    acetaminophen tablet 650 mg Q4H PRN    albuterol-ipratropium 2.5 mg-0.5 mg/3 mL nebulizer solution 3 mL Q6H PRN    aluminum-magnesium hydroxide-simethicone 200-200-20 mg/5 mL suspension 30 mL QID PRN    aspirin chewable tablet 81 mg Daily    atorvastatin tablet 80 mg Daily    dextrose 50% injection 12.5 g PRN    dextrose 50% injection 25 g PRN    doxazosin tablet 16 mg Daily    empagliflozin (Jardiance) tablet 10 mg Daily    [START ON 7/28/2025] epoetin tanya injection 4,000 Units Q7 Days    ergocalciferol capsule 50,000 Units Q7 Days    famotidine tablet 20 mg Daily PRN    ferrous sulfate tablet 1 each Daily    furosemide tablet 60 mg BID    gabapentin capsule 300 mg BID    glucagon (human recombinant) injection 1 mg PRN    glucose chewable tablet 16 g PRN    glucose chewable tablet 24 g PRN    hydrALAZINE 10 mg 2 tablets, hydrALAZINE 25 mg 2 tablets, isorsorbide dinitrate 20 mg  2 tablets combination TID    insulin aspart U-100 insulin pump from home 0-10 Units PRN    insulin aspart U-100 insulin pump from home Continuous    magnesium oxide tablet 400 mg BID    meclizine tablet 25 mg TID PRN    melatonin tablet 6 mg Nightly PRN    metoprolol tartrate (LOPRESSOR) tablet 25 mg BID    naloxone 0.4 mg/mL  injection 0.02 mg PRN    ondansetron disintegrating tablet 8 mg Q8H PRN    polyethylene glycol packet 17 g Daily    potassium chloride SA CR tablet 20 mEq Daily    predniSONE tablet 5 mg Daily    rivaroxaban tablet 15 mg Daily with dinner    sodium chloride 0.9% flush 10 mL PRN    tacrolimus capsule 1 mg BID    valsartan tablet 160 mg BID     Family History       Problem Relation (Age of Onset)    Cancer Brother    Diabetes Mother    Heart disease Mother, Father    Hypertension Mother, Brother          Tobacco Use    Smoking status: Former     Current packs/day: 0.00     Average packs/day: 0.5 packs/day for 32.0 years (16.0 ttl pk-yrs)     Types: Cigarettes     Start date: 1984     Quit date: 2016     Years since quittin.6    Smokeless tobacco: Never   Vaping Use    Vaping status: Never Used   Substance and Sexual Activity    Alcohol use: Not Currently    Drug use: No    Sexual activity: Yes     Partners: Female     Review of Systems   Constitutional:  Positive for fever.   Respiratory:  Negative for shortness of breath.    Cardiovascular:  Negative for chest pain.   Gastrointestinal:  Negative for abdominal pain.   Neurological:  Positive for weakness.     Objective:     Vital Signs (Most Recent):  Temp: 98.4 °F (36.9 °C) (25 0404)  Pulse: (!) 59 (25 040)  Resp: 18 (25 040)  BP: (!) 176/81 (FAHEEM Laguna PA-C notified, meds given) (25 040)  SpO2: 97 % (25 040) Vital Signs (24h Range):  Temp:  [97.8 °F (36.6 °C)-98.7 °F (37.1 °C)] 98.4 °F (36.9 °C)  Pulse:  [52-72] 59  Resp:  [16-18] 18  SpO2:  [95 %-100 %] 97 %  BP: (170-189)/(72-87) 176/81     Weight: 86.2 kg (190 lb) (25 1445)  Body mass index is 25.07 kg/m².  Body surface area is 2.11 meters squared.    I/O last 3 completed shifts:  In: 400 [P.O.:300; IV Piggyback:100]  Out: 350 [Urine:350]     Physical Exam  HENT:      Head: Normocephalic.   Cardiovascular:      Rate and Rhythm: Normal rate.      Heart sounds:  Normal heart sounds.   Pulmonary:      Breath sounds: Normal breath sounds.   Abdominal:      Palpations: Abdomen is soft.   Neurological:      Mental Status: He is alert. Mental status is at baseline.   Psychiatric:         Mood and Affect: Mood normal.          Significant Labs:  CBC:   Recent Labs   Lab 07/23/25  0426   WBC 7.32   RBC 4.05*   HGB 8.6*   HCT 28.0*      MCV 69*   MCH 21.2*   MCHC 30.7*     CMP:   Recent Labs   Lab 07/22/25  1610 07/23/25  0426   * 92   CALCIUM 9.1 9.0   ALBUMIN 2.6*  --    PROT 6.6  --    * 137   K 3.4* 3.7   CO2 22* 20*    106   BUN 70* 70*   CREATININE 4.0* 3.8*   ALKPHOS 57  --    ALT 8*  --    AST 20  --    BILITOT 0.3  --      All labs within the past 24 hours have been reviewed.

## 2025-07-23 NOTE — NURSING
ALEJANDRA,VSS,NAD. AVS reviewed by virtual nurse HE Snider. Pt verbalizes understanding of all information provided and voice no other concerns. Pt discharged off the unit via wheelchair by hospital transport.

## 2025-07-23 NOTE — ASSESSMENT & PLAN NOTE
Creatine stable for now. BMP reviewed- noted Estimated Creatinine Clearance: 20.3 mL/min (A) (based on SCr of 4 mg/dL (H)). according to latest data. Based on current GFR, CKD stage is stage 4 - GFR 15-29.  Monitor UOP and serial BMP and adjust therapy as needed. Renally dose meds. Avoid nephrotoxic medications and procedures.     Resume doxazosin 16 mg daily, bidil 2 tabs TID, furosemide 60 mg BID, valsartan 160 mg BID, metoprolol 25 mg BID

## 2025-07-23 NOTE — ASSESSMENT & PLAN NOTE
Patient has paroxysmal (<7 days) atrial fibrillation. Patient is currently in atrial fibrillation. [id number] Score: 3. The patients heart rate in the last 24 hours is as follows:  Pulse  Min: 58  Max: 64  Antiarrhythmics     Anticoagulants       Plan  - Replete lytes with a goal of K>4, Mg >2  - Patient is anticoagulated, see medications listed above.  - Patient's afib is currently controlled  - hold bb given concerns of latha cardia and pauses on tele. Will follow up with cardiology outpatient.

## 2025-07-23 NOTE — DISCHARGE INSTRUCTIONS
Our goal at [facility] is to always give you outstanding care and exceptional service. You may receive a survey from [employer] by mail, text or e-mail in the next 24-48 hours asking about the care you received with us. The survey should only take 5-10 minutes to complete and is very important to us.     Your feedback provides us with a way to recognize our staff who work tirelessly to provide the best care! Also, your responses help us learn how to improve when your experience was below our aspiration of excellence. We are always looking for ways to improve your stay. We WILL use your feedback to continue making improvements to help us provide the highest quality care. We keep your personal information and feedback confidential. We appreciate your time completing this survey and can't wait to hear from you!!!    We look forward to your continued care with us! Thanks so much for choosing [facility] for your healthcare needs!

## 2025-07-23 NOTE — ASSESSMENT & PLAN NOTE
Patient has paroxysmal (<7 days) atrial fibrillation. Patient is currently in atrial fibrillation. [id number] Score: 3. The patients heart rate in the last 24 hours is as follows:  Pulse  Min: 58  Max: 64  Antiarrhythmics     Anticoagulants       Plan  - Replete lytes with a goal of K>4, Mg >2  - Patient is anticoagulated, see medications listed above.  - Patient's afib is currently controlled  - hold bb given concerns of latha cardia and pauses on tele. Will follow up with cardiology outpatient.

## 2025-07-23 NOTE — ASSESSMENT & PLAN NOTE
"Goal Outcome Evaluation:       Patient's HR controlled in am with dilt drip, but SBP not tolerating medication. Patient Also receiving BID metoprolol without HR improvement. Diltiazem drip discontinued and patient bridged to digoxin. Loading dose administered without improvement. Patient given 500mL fluid bolus without improvement in HR. Patient is NPO for potential cardioversion. Patient is asymptomatic. He has no complaints of chest pain or SOB  at this time. He was able to ambulate the unit today with minimal dizzines and SOB. Plan is to restart dilt with new titration and SBP perameters.       BP 97/74   Pulse 116   Temp 97.2  F (36.2  C) (Temporal)   Resp 22   Ht 1.803 m (5' 11\")   Wt 98.5 kg (217 lb 1.6 oz)   SpO2 94%   BMI 30.28 kg/m          Problem: Plan of Care - These are the overarching goals to be used throughout the patient stay.    Goal: Plan of Care Review  Description: The Plan of Care Review/Shift note should be completed every shift.  The Outcome Evaluation is a brief statement about your assessment that the patient is improving, declining, or no change.  This information will be displayed automatically on your shift note.  9/5/2023 1824 by Bryn Beltran, RN  Outcome: Progressing  9/5/2023 1824 by Bryn Beltran, RN  Outcome: Not Progressing  Goal: Patient-Specific Goal (Individualized)  Description: You can add care plan individualizations to a care plan. Examples of Individualization might be:  \"Parent requests to be called daily at 9am for status\", \"I have a hard time hearing out of my right ear\", or \"Do not touch me to wake me up as it startles me\".  9/5/2023 1824 by Bryn Beltran, RN  Outcome: Progressing  9/5/2023 1824 by Bryn Beltran, RN  Outcome: Not Progressing  Goal: Absence of Hospital-Acquired Illness or Injury  9/5/2023 1824 by Bryn Beltran, RN  Outcome: Progressing  9/5/2023 1824 by Bryn Beltran, RN  Outcome: Not Progressing  Intervention: Identify " - Patient's FSGs are controlled on current hypoglycemics.   - Last A1c reviewed- 6.5  Maintain anti-hyperglycemic dose as follows-   - endocrine consulted for insulin pump management. On home insulin pump      and Manage Fall Risk  Recent Flowsheet Documentation  Taken 9/5/2023 1600 by Bryn Beltran RN  Safety Promotion/Fall Prevention:   activity supervised   room door open   room near nurse's station   room organization consistent   safety round/check completed  Taken 9/5/2023 1130 by Bryn Beltran RN  Safety Promotion/Fall Prevention:   activity supervised   room door open   room near nurse's station   room organization consistent   safety round/check completed  Taken 9/5/2023 0715 by Bryn Beltran RN  Safety Promotion/Fall Prevention:   activity supervised   room door open   room near nurse's station   room organization consistent   safety round/check completed  Intervention: Prevent Skin Injury  Recent Flowsheet Documentation  Taken 9/5/2023 1600 by Bryn Beltran RN  Body Position: position changed independently  Taken 9/5/2023 1130 by Bryn Beltran RN  Body Position: position changed independently  Taken 9/5/2023 0715 by Bryn Beltran RN  Body Position: position changed independently  Intervention: Prevent and Manage VTE (Venous Thromboembolism) Risk  Recent Flowsheet Documentation  Taken 9/5/2023 1600 by Bryn Beltran RN  VTE Prevention/Management:   patient refused intervention   other (see comments)  Taken 9/5/2023 1130 by Bryn Beltran RN  VTE Prevention/Management:   patient refused intervention   other (see comments)  Taken 9/5/2023 0715 by Bryn Beltran RN  VTE Prevention/Management:   patient refused intervention   other (see comments)  Goal: Optimal Comfort and Wellbeing  9/5/2023 1824 by Bryn Beltran RN  Outcome: Progressing  9/5/2023 1824 by Bryn Beltran RN  Outcome: Not Progressing  Intervention: Provide Person-Centered Care  Recent Flowsheet Documentation  Taken 9/5/2023 1600 by Bryn Beltran RN  Trust Relationship/Rapport: care explained  Taken 9/5/2023 1130 by Bryn Beltran RN  Trust Relationship/Rapport: care explained  Taken 9/5/2023  0715 by Bryn Beltran RN  Trust Relationship/Rapport: care explained  Goal: Readiness for Transition of Care  9/5/2023 1824 by Bryn Beltran, RN  Outcome: Progressing  9/5/2023 1824 by Bryn Beltran RN  Outcome: Not Progressing     Problem: Excessive Substance Use  Goal: Optimized Energy Level (Excessive Substance Use)  9/5/2023 1824 by Bryn Beltran, RN  Outcome: Progressing  9/5/2023 1824 by Bryn Beltran, RN  Outcome: Not Progressing  Goal: Improved Behavioral Control (Excessive Substance Use)  9/5/2023 1824 by Bryn Beltran, RN  Outcome: Progressing  9/5/2023 1824 by Bryn Beltran RN  Outcome: Not Progressing  Goal: Increased Participation and Engagement (Excessive Substance Use)  9/5/2023 1824 by Bryn Beltran, RN  Outcome: Progressing  9/5/2023 1824 by Bryn Beltran, RN  Outcome: Not Progressing  Goal: Improved Physiologic Symptoms (Excessive Substance Use)  9/5/2023 1824 by Bryn Beltran, RN  Outcome: Progressing  9/5/2023 1824 by Bryn Beltran, RN  Outcome: Not Progressing  Goal: Enhanced Social, Occupational or Functional Skills (Excessive Substance Use)  9/5/2023 1824 by Bryn Beltran, RN  Outcome: Progressing  9/5/2023 1824 by Bryn Beltran RN  Outcome: Not Progressing  Intervention: Promote Social, Occupational and Functional Ability  Recent Flowsheet Documentation  Taken 9/5/2023 1600 by Bryn Beltran RN  Trust Relationship/Rapport: care explained  Taken 9/5/2023 1130 by Bryn Beltran RN  Trust Relationship/Rapport: care explained  Taken 9/5/2023 0715 by Bryn Beltran RN  Trust Relationship/Rapport: care explained

## 2025-07-23 NOTE — ASSESSMENT & PLAN NOTE
At time of evaluation, pt meets criteria to continue home insulin pump usage.  - Has all adequate supplies   - Bolus settings reviewed    - No changes to home regimen.   - Nurse to check BG qac/hs/0200 & record in epic   - Patient to input glucose into pump and use bolus wizard for prandial needs   - Will continue to monitor accuchecks and titrate insulin as clinically indicated .     - Discussed above plan with patient, patient verbalized understanding.   - Understands in case of pump malfunction or cognitive decline in which pt can no longer safely use insulin pump, will transition to SC MDI        If pump malfunctions or is disconnected, please notify the on-call provider for endocrinology.

## 2025-07-23 NOTE — HPI
Ravi is a pleasant 70 yo man w pmhx significant for ESRD 2/2 HTN/DM2 s/p DDKT 11/5/2016 BL Cr 2-3, Afib, HFpEF, HTN, HLD, PAD, IDDM, CVA. He was just here recently in the hospital last week, diagnosed w PNA and seen by pulmonology and ID. He presents with similar symptoms fever and weakness, no SOB. Cr up to 3.8. BNP 4520, Hg 8.6, BUN 70. SBP hypertensive in the 170s. 1+ protein in urine. KTM service consulted for IS management and PHUONG.

## 2025-07-23 NOTE — PLAN OF CARE
[facility] scheduled pcp f/u   Future Appointments   Date Time Provider Department [place]   7/24/2025 10:30 AM LAB, APPOINTMENT [place] [facility]   7/24/2025 11:00 AM Paris Lujan PA-C [facility] HRTC [facility]   7/24/2025 11:00 AM [facility] HEART FAILURE NURSE [facility] HRTFTC [facility]   7/29/2025  2:30 PM Devin Wood MD [facility] PULMSVC [facility]   7/31/2025  1:00 PM Aditya Kasper MD [facility] NEPHRO [facility]   8/1/2025 10:30 AM Mima Mack MD [facility] IM [facility]   8/4/2025 10:30 AM EPO INJECTION SCHEDULE [facility] NEPHRO [facility]   8/5/2025  3:20 PM Gillies, Connor M, DO [facility] CARDIO [facility]   9/16/2025 10:00 AM EKG, APPT [facility] EKG [facility]   9/16/2025 10:30 AM Mima Lyons, LELAND [facility] ARRHYTH [facility]   9/30/2025  9:30 AM LAB, [facility] LAB [facility]   10/7/2025 11:00 AM Karan Lopez APRN, KEO [facility]   12/17/2025 10:00 AM Argelia Bridges RD [facility] INTLDIA [facility]

## 2025-07-23 NOTE — SUBJECTIVE & OBJECTIVE
Past Medical History:   Diagnosis Date    Anxiety 07/29/2014    Arthritis     atrial fibrillation     History of cardioversion    Bilateral diabetic retinopathy 2017    Cardioembolic stroke 12/07/2024    almost completely resolved - very mild left hand weakness    CKD (chronic kidney disease)     in transplanted kidney    Colon polyps 2014    Depression - situational 07/29/2014    Diabetes type 2 2000    since 2000.  c/b neuropathy, CKD    Encounter for blood transfusion     Febrile illness 07/22/2025    History of hepatitis C, s/p successful Rx w/ SVR24 - 9/2017 07/29/2014    Genotype 1a, treatment naive 10/2014 liver biopsy - grade 1 / stage 1 Completed Harvoni w/ SVR    Hyperlipidemia 07/29/2014    Hypertension     Hyponatremia 02/20/2025    Pancreatitis     S/P cadaveric kidney transplant 11/5/2016. ESRD secondary to HTN/DMII 11/05/2016    Stroke 2024       Past Surgical History:   Procedure Laterality Date    ABLATION OF ARRHYTHMOGENIC FOCUS FOR ATRIAL FIBRILLATION N/A 6/11/2024    Procedure: Ablation atrial fibrillation;  Surgeon: Bronson Bowden MD;  Location: [facility] EP LAB;  Service: Cardiology;  Laterality: N/A;  AF, FELICIANO (cx if SR), PVI, RFA, Carto, Gen, GP, 3 Prep    ABLATION OF ARRHYTHMOGENIC FOCUS FOR ATRIAL FIBRILLATION N/A 6/12/2025    Procedure: Ablation atrial fibrillation;  Surgeon: Bronson Bowden MD;  Location: [facility] EP LAB;  Service: Cardiology;  Laterality: N/A;  AF, FELICIANO (h/o CVA), redo PVI, CTI, RFA, Carto, Gen, GP, 3 Prep *insulin pump*    ABLATION, ATRIAL FLUTTER, ATYPICAL  6/12/2025    Procedure: Ablation, Atrial Flutter, Atypical;  Surgeon: Bronson Bowden MD;  Location: [facility] EP LAB;  Service: Cardiology;;    ABLATION, ATRIAL FLUTTER, TYPICAL  6/11/2024    Procedure: Ablation, Atrial Flutter, Typical;  Surgeon: Bronson Bowden MD;  Location: [facility] EP LAB;  Service: Cardiology;;    ABLATION, ATRIAL FLUTTER, TYPICAL N/A 6/12/2025    Procedure: Ablation, Atrial Flutter, Typical;  Surgeon:  Bronson Bowden MD;  Location: [facility] EP LAB;  Service: Cardiology;  Laterality: N/A;    APPENDECTOMY      BONE MARROW BIOPSY Left 6/26/2024    Procedure: Biopsy-bone marrow;  Surgeon: Bridger Zapata MD;  Location: [facility] ENDO (4TH FLR);  Service: Oncology;  Laterality: Left;  6/24-pt knows to hold aspirin and eliquis as instructed by hem/onc, precall complete-Kpvt    CARDIOVERSION  6/11/2024    Procedure: Cardioversion;  Surgeon: Bronson Bowden MD;  Location: [facility] EP LAB;  Service: Cardiology;;    CATARACT EXTRACTION W/  INTRAOCULAR LENS IMPLANT Right 3/13/2024    Procedure: EXTRACTION, CATARACT, WITH IOL INSERTION;  Surgeon: Jennifer Palacio MD;  Location: [facility] OR;  Service: Ophthalmology;  Laterality: Right;    CATARACT EXTRACTION W/  INTRAOCULAR LENS IMPLANT Left 4/10/2024    Procedure: EXTRACTION, CATARACT, WITH IOL INSERTION;  Surgeon: Jennifer Palacio MD;  Location: [facility] OR;  Service: Ophthalmology;  Laterality: Left;    COLONOSCOPY N/A 12/21/2020    Procedure: COLONOSCOPY;  Surgeon: Tico Bell MD;  Location: [facility] ENDO (4TH FLR);  Service: Endoscopy;  Laterality: N/A;  ok to hold eliquis per Dr. Valadez, see telephone encounter 11/13/2020-[place]  covid test 12/18 [place]    ECHOCARDIOGRAM,TRANSESOPHAGEAL N/A 2/3/2025    Procedure: Transesophageal echo (FELICIANO) intra-procedure log documentation;  Surgeon: Grayson Lin III, MD;  Location: [facility] EP LAB;  Service: Cardiology;  Laterality: N/A;    KIDNEY TRANSPLANT      TRANSESOPHAGEAL ECHOCARDIOGRAPHY N/A 8/26/2019    Procedure: ECHOCARDIOGRAM, TRANSESOPHAGEAL;  Surgeon: [facility] Diagnostic Provider;  Location: [facility] EP LAB;  Service: Cardiology;  Laterality: N/A;    TRANSESOPHAGEAL ECHOCARDIOGRAPHY N/A 6/11/2024    Procedure: ECHOCARDIOGRAM, TRANSESOPHAGEAL;  Surgeon: Grayson Lin III, MD;  Location: [facility] EP LAB;  Service: Cardiology;  Laterality: N/A;    TRANSESOPHAGEAL ECHOCARDIOGRAPHY N/A 6/12/2025    Procedure: ECHOCARDIOGRAM, TRANSESOPHAGEAL;   Surgeon: Breezy Nguyen MD;  Location: [facility] EP LAB;  Service: Cardiology;  Laterality: N/A;    TREATMENT OF CARDIAC ARRHYTHMIA N/A 8/26/2019    Procedure: CARDIOVERSION;  Surgeon: Bronson Bowden MD;  Location: [facility] EP LAB;  Service: Cardiology;  Laterality: N/A;  AF, FELICIANO, DCCV, MAC, GP, 3 PREP    TREATMENT OF CARDIAC ARRHYTHMIA N/A 2/3/2025    Procedure: Cardioversion or Defibrillation;  Surgeon: Bronson Bowden MD;  Location: [facility] EP LAB;  Service: Cardiology;  Laterality: N/A;  AF, FELICIANO, DCCV, MAC, GP, 3 Prep       Review of patient's allergies indicates:   Allergen Reactions    Nifedipine Other (See Comments)     Gingival hyperplasia       Medications:  Facility-Administered Medications Prior to Admission   Medication    epoetin tanya injection 4,000 Units     Medications Prior to Admission   Medication Sig    acetaminophen (TYLENOL) 500 MG tablet Take 500 mg by mouth every 6 (six) hours as needed for Pain.    aspirin 81 mg Tab Take 81 mg by mouth once daily.    atorvastatin (LIPITOR) 80 MG tablet Take 1 tablet (80 mg total) by mouth once daily.    blood sugar diagnostic Strp Use to check blood glucose 1 times daily, to use with insurance preferred meter    blood-glucose sensor (DEXCOM G7 SENSOR) Kayla Change sensor every 10 days.    doxazosin (CARDURA) 8 MG Tab Take 1 tablet (8 mg total) by mouth once daily.    empagliflozin (JARDIANCE) 10 mg tablet Take 1 tablet (10 mg total) by mouth once daily.    ergocalciferol (VITAMIN D2) 50,000 unit Cap Take 1 capsule (50,000 Units total) by mouth every 7 days.    famotidine (PEPCID) 20 MG tablet Take 1 tablet by mouth every evening.    ferrous sulfate (FEOSOL) 325 mg (65 mg iron) Tab tablet Take 1 tablet (325 mg total) by mouth daily with breakfast.    gabapentin (NEURONTIN) 300 MG capsule Take 1 capsule (300 mg total) by mouth 2 (two) times daily.    insulin aspart U-100 (NOVOLOG FLEXPEN U-100 INSULIN) 100 unit/mL (3 mL) InPn pen Use pump. Max daily 100 units     "insulin aspart U-100 (NOVOLOG U-100 INSULIN ASPART) 100 unit/mL injection Use in pump, max daily 100 units.    insulin pump cart,auto,BT,G6/7 (OMNIPOD 5 G6-G7 PODS, GEN 5,) Crtg Change every 48 hours.    isosorbide-hydrALAZINE 20-37.5 mg (BIDIL) 20-37.5 mg Tab Take 2 tablets by mouth 3 (three) times daily.    lancets 30 gauge Misc Use to check blood glucose 1 times daily, to use with insurance preferred meter    magnesium oxide (MAG-OX) 400 mg (241.3 mg magnesium) tablet Take 1 tablet (400 mg total) by mouth 2 (two) times daily.    meclizine (ANTIVERT) 25 mg tablet Take 1 tablet (25 mg total) by mouth 3 (three) times daily as needed for Dizziness.    melatonin 3 mg Cap Take 2 capsules by mouth nightly as needed (Insomnia).    metoprolol tartrate (LOPRESSOR) 25 MG tablet Take 1 tablet (25 mg total) by mouth 2 (two) times daily.    mycophenolate (CELLCEPT) 250 mg Cap Take 500 mg by mouth 2 (two) times daily. (Patient not taking: Reported on 7/21/2025)    pen needle, diabetic (BD ULTRA-FINE LO PEN NEEDLE) 32 gauge x 5/32" Ndle USE TO ADMINISTER INSULIN 4 TIMES DAILY.    polyethylene glycol (MIRALAX) 17 gram PwPk Take 17 g by mouth daily as needed for Constipation. Take 17 gm (mixed in liquid) by mouth every day.    potassium chloride SA (K-DUR,KLOR-CON) 20 MEQ tablet Take 20 mEq by mouth.    predniSONE (DELTASONE) 5 MG tablet Take 1 tablet (5 mg total) by mouth once daily.    rivaroxaban (XARELTO) 15 mg Tab Take 1 tablet (15 mg total) by mouth daily with dinner or evening meal.    tacrolimus (PROGRAF) 1 MG Cap Take 1 capsule (1 mg total) by mouth every 12 (twelve) hours.    torsemide (DEMADEX) 20 MG Tab Take 2 tablets (40 mg total) by mouth once daily.     Antibiotics (From admission, onward)      None          Antifungals (From admission, onward)      None          Antivirals (From admission, onward)      None             Immunization History   Administered Date(s) Administered    COVID-19, MRNA, LN-S, PF (Pfizer) " (Purple Cap) 2021, 2021, 10/30/2021, 10/30/2021    COVID-19, mRNA, LNP-S, PF (Moderna) Ages 12+ 2023    COVID-19, mRNA, LNP-S, PF, naldo-sucrose, 30 mcg/0.3 mL (Pfizer Ages 12+) 2024    COVID-19, mRNA, LNP-S, bivalent booster, PF (PFIZER OMICRON) 2023    Hepatitis A, Adult 2014    Hepatitis B 2014, 10/21/2014    Hepatitis B, Adult 2000, 2000, 2001    Hepatitis B, Dialysis, 3 Dose 2014, 10/21/2014    Influenza 10/01/2017    Influenza (FLUAD) - Quadrivalent - Adjuvanted - PF *Preferred* (65+) 2023    Influenza - Quadrivalent 2019, 2019    Influenza - Quadrivalent - PF *Preferred* (6 months and older) 09/15/2017, 2018, 2018, 2020, 2020, 10/10/2022    Influenza - Trivalent - Afluria, Fluzone MDV 10/01/2015, 2016, 2016, 10/09/2021    Influenza - Trivalent - Fluad - Adjuvanted - PF (65 years and older 2024    Influenza - Trivalent - Flucelvax - PF 08/10/2014, 08/10/2014    Influenza Split 10/09/2021    Pneumococcal Conjugate - 13 Valent 2014    Pneumococcal Conjugate - 20 Valent 2023    Pneumococcal Polysaccharide - 23 Valent 2014    RSVpreF (Arexvy) 2024    Tdap 2000, 10/21/2014, 09/15/2017    Zoster 2014    Zoster Recombinant 2018, 2018, 10/30/2019       Family History       Problem Relation (Age of Onset)    Cancer Brother    Diabetes Mother    Heart disease Mother, Father    Hypertension Mother, Brother          Social History     Socioeconomic History    Marital status:    Occupational History     Employer: [employer]   Tobacco Use    Smoking status: Former     Current packs/day: 0.00     Average packs/day: 0.5 packs/day for 32.0 years (16.0 ttl pk-yrs)     Types: Cigarettes     Start date: 1984     Quit date: 2016     Years since quittin.6    Smokeless tobacco: Never   Vaping Use    Vaping status: Never Used    Substance and Sexual Activity    Alcohol use: Not Currently    Drug use: No    Sexual activity: Yes     Partners: Female   Social History Narrative     for 14 years     for 38 years    2 children ages 41, 42     Social Drivers of Health     Financial Resource Strain: Low Risk  (7/23/2025)    Overall Financial Resource Strain (CARDIA)     Difficulty of Paying Living Expenses: Not hard at all   Food Insecurity: No Food Insecurity (7/23/2025)    Hunger Vital Sign     Worried About Running Out of Food in the Last Year: Never true     Ran Out of Food in the Last Year: Never true   Transportation Needs: No Transportation Needs (7/23/2025)    PRAPARE - Transportation     Lack of Transportation (Medical): No     Lack of Transportation (Non-Medical): No   Physical Activity: Insufficiently Active (7/23/2025)    Exercise Vital Sign     Days of Exercise per Week: 4 days     Minutes of Exercise per Session: 20 min   Stress: No Stress Concern Present (7/23/2025)    [employer] - Occupational Stress Questionnaire     Feeling of Stress : Not at all   Recent Concern: Stress - Stress Concern Present (7/15/2025)    [employer] - Occupational Stress Questionnaire     Feeling of Stress : To some extent   Housing Stability: Low Risk  (7/23/2025)    Housing Stability Vital Sign     Unable to Pay for Housing in the Last Year: No     Number of Times Moved in the Last Year: 0     Homeless in the Last Year: No     Review of Systems   Constitutional:  Positive for fever. Negative for chills and diaphoresis.   HENT:  Negative for rhinorrhea and sore throat.    Respiratory:  Negative for cough and shortness of breath.    Cardiovascular:  Negative for chest pain and leg swelling.   Gastrointestinal:  Negative for abdominal pain, diarrhea, nausea and vomiting.   Genitourinary:  Negative for dysuria and hematuria.   Musculoskeletal:  Negative for arthralgias and myalgias.    Skin:  Negative for rash.   Neurological:  Negative for headaches.     Objective:     Vital Signs (Most Recent):  Temp: 97.6 °F (36.4 °C) (07/23/25 1549)  Pulse: (!) 59 (07/23/25 1549)  Resp: 18 (07/23/25 0728)  BP: (!) 169/70 (07/23/25 1549)  SpO2: 98 % (07/23/25 1549) Vital Signs (24h Range):  Temp:  [97.6 °F (36.4 °C)-98.4 °F (36.9 °C)] 97.6 °F (36.4 °C)  Pulse:  [52-72] 59  Resp:  [16-18] 18  SpO2:  [95 %-100 %] 98 %  BP: (168-189)/(70-87) 169/70     Weight: 86.2 kg (190 lb)  Body mass index is 25.07 kg/m².    Estimated Creatinine Clearance: 21.3 mL/min (A) (based on SCr of 3.8 mg/dL (H)).     Physical Exam  Vitals reviewed.   Constitutional:       General: He is not in acute distress.     Appearance: He is well-developed. He is not diaphoretic.   HENT:      Head: Normocephalic and atraumatic.      Nose: Nose normal.   Eyes:      Conjunctiva/sclera: Conjunctivae normal.   Pulmonary:      Effort: Pulmonary effort is normal. No respiratory distress.      Breath sounds: No wheezing, rhonchi or rales.   Abdominal:      General: Abdomen is flat. There is no distension.      Tenderness: There is no abdominal tenderness. There is no guarding.      Comments: Umbilical hernia, reducible   Musculoskeletal:      Cervical back: Normal range of motion.      Right lower leg: No edema.      Left lower leg: No edema.   Skin:     General: Skin is warm and dry.      Findings: No erythema or rash.   Neurological:      Mental Status: He is alert.   Psychiatric:         Behavior: Behavior normal.          Significant Labs: All pertinent labs within the past 24 hours have been reviewed.    Significant Imaging: I have reviewed all pertinent imaging results/findings within the past 24 hours.

## 2025-07-23 NOTE — ASSESSMENT & PLAN NOTE
Patient has paroxysmal (<7 days) atrial fibrillation. Patient is currently in atrial fibrillation. [id number] Score: 3. The patients heart rate in the last 24 hours is as follows:  Pulse  Min: 52  Max: 72     Antiarrhythmics  metoprolol tartrate (LOPRESSOR) tablet 25 mg, 2 times daily, Oral    Anticoagulants  rivaroxaban tablet 15 mg, With dinner, Oral    Plan  - Replete lytes with a goal of K>4, Mg >2  - Patient is anticoagulated, see medications listed above.  - Patient's afib is currently controlled

## 2025-07-23 NOTE — DISCHARGE SUMMARY
"Arvind Jay - Acute Medical [facility] Medicine  Discharge Summary      Patient Name: Ravi Zamorano  MRN: [id number]  MARISOL: [id number]  Patient Class: IP- Inpatient  Admission Date: 7/12/2025  Hospital Length of Stay: 5 days  Discharge Date and Time: 7/18/2025  1:26 PM  Attending Physician: Sho att. providers found   Discharging Provider: Hiram Mcgrath MD  Primary Care Provider: Mima Mack MD  Hospital Medicine Team: [facility] [place] [facility] MED A Hiram Mcgrath MD  Primary Care Team: [facility] MED A    HPI:   Ravi Zamorano is a 69 y.o. male s/p kidney transplant in 2016 on tacro, CKD 4, HFpEF, T2DM with insulin pump use, afib on xarelto and AICD being admitted to hospital medicine for manangement of PHUONG on CKD and CHF exacerbation.  Patient reports increased feelings of lightheadedness/dizziness with leaning forward the past several days. Endorses associated unsteady gait and bilateral "hand twitching". During the encounter he seems to have a difficult time explaining his symptoms or their duration. Denies any recent illness, cough, congestion, N/V/D, abdominal pain, dysuria or hematuria. Febrile to 101.4 F on admission, when asked if having recent fevers he says he often has fevers to 101 F at home but is unable to tell me the most recent time this has happened. Sates he does not know what his current home medication regimen is.       In ED: hypertensive to 230/98, febrile to Tmax 101.4 F, remaining vitals stable. CBC without leukocytosis, hgb 8.5 which is baseline. CMP notable for PHUONG on CKD (creatinine 3.9, most recent baseline ~3.1). BNP elevated to 4,888 (elevated from most recent 3,164 6 days ago). Lactic acid and procal wnl. UA non infectious. CT head without acute intracranial abnormality. CXR with cardiomegaly with interstitial edema. Given 1 0 mg IV hydralazine x1, 20 mg IV hydralazine x1 and 80 mg IV lasix in the ED. Admitted to hospital medicine for further management.     * No surgery found *  "     Hospital Course:   While on the floor patient remained hypertensive. KTM and ID consulted as well as Nephrology. Continued on IV diuresis for concerns of Cardiorenal with improvement in Cr. On IV antibiotics per ID recs with Cefepime and Flagyl. CT C/A/P notable for aspiration PNA as well as concerns for renal mass. Dedicated Renal US ordered and notable for new renal lesion on the left kidney concerning for renal cell carcinoma. Urology recommending outpatient follow up for Uro Onc clinic. KTM recommending Pulm consult for recurrent PNA. Esophagram ordered given history of recurrent PNA. Cr improved and diuresis decreased to PO. Hypertensive throughout hospitalization. ARB restarted since Cr stable. Had concerns of pauses on tele and AV block. Discussed with EP. Hold BB at discharge. No third deg av block on EKG or seen when reviewing tele with EP. Had fever on 7/16, discussed with ID. Will continue to monitor now and can consider re consulting if fever persist.     Was afebrile fro 48 hours, certainly the fevers may be due to possible malignancy, this was discussed with him and wife. Will need f/u for possible nephrectomy.            Goals of Care Treatment Preferences:  Code Status: Full Code         Consults:   Consults (From admission, onward)          Status Ordering Provider     Inpatient consult to Pulmonology  Once        Provider:  (Not yet assigned)    Completed NATY WONG     Inpatient consult to Infectious Diseases  Once        Provider:  (Not yet assigned)    Completed NATY WONG     Inpatient consult to Endocrinology  Once        Provider:  (Not yet assigned)    Completed CARO BENAVIDES     Inpatient consult to Nephrology  Once        Provider:  (Not yet assigned)    Completed CARO BENAVIDES     Inpatient consult to Kidney/Pancreas Transplant Medicine  Once        Provider:  (Not yet assigned)    Completed CARO BENAVIDES            Assessment & Plan  Acute on chronic  diastolic congestive heart failure   - CHF pathway initiated   - CXR revealed Cardiomegaly with interstitial edema,   - BNP 4,888 / troponin 205>>220  - EKG with atrial fibrillation recurrence (recent ablation 04/25)  - last echo showing ejection fraction of 50 - 55%.   - repeat TTE ordered with 50-55% and grade III diastolic dysfunction. Elevated pulmonary artery pressure   -  de escalate to PO lasix 60 mg BID   - strict Is/Os   - cardiac, low sodium diet  - fluid restrict 1.5L while inpatient  - daily CMP   - monitor on cardiac telemetry   - supplemental O2 as needed   Community acquired bacterial pneumonia  Fever   Unclear etiology, patient without symptoms to guide possible cause  - febrile to 101.4F on admission  - CBC without leukocytosis  - CT head without acute process  -CT C/A/P concerning for asp vs multifocal PNA and renal mass    -possible component of malignancy causing fevers   -will continue Doxycycline and Augmentin   -no further episodes of fever   -Pulm consulted for recurrent asp PNA. Esophagram ordered.   -Cleared by ST without swallowing issues   -another fever 7/16-discussed with ID. Will continue to monitor at this time. (Possibly multifactorial given concern of malignancy)      Hypertensive emergency  Patient has a current diagnosis of Hypertensive emergency with end organ damage evidenced by acute kidney injury which is controlled.  Latest blood pressure and vitals reviewed-   Temp:  [98.4 °F (36.9 °C)-98.7 °F (37.1 °C)]   Pulse:  [58-64]   Resp:  [16-18]   BP: (180-189)/(72-86)   SpO2:  [96 %-98 %] .   Patient currently off IV antihypertensives.   Home meds for hypertension were reviewed and noted below.   holding BB due to pauses on tele    -cannot tolerate CCB   -will increase doxazosin and start hydralazine 100 mg TID and Imdur 60, will increase   -restart ARB since PHUONG is improving     Left renal mass  -noted on CT A/P and on dedicated Retroperitoneal US  -discussed with Urology. Will  arrange outpatient Uro Onc clinic follow up     Acute kidney injury superimposed on stage 4 chronic kidney disease  CKD IV (severe)  Suspect etiology cardiorenal.    - Cr 3.9.  Baseline Cr 3.1   -CR close to baseline   - UA unremarkable   - renal transplant US ordered. Renal US with renal mass   - nephrology consulted, appreciate recommendations   - Monitor UOP and follow serial BMP   - Adjust drugs to GFR/CrCL; avoid nephrotoxic drugs   -  Estimated Creatinine Clearance: 20.3 mL/min (A) (based on SCr of 4 mg/dL (H)).   - Monitor electrolytes, phos levels daily  -improving with diuresis and BP control   Type 2 diabetes mellitus with stage 4 chronic kidney disease, with long-term current use of insulin  Insulin pump in place  - Patient's FSGs are controlled on current hypoglycemics.   - Last A1c reviewed- 6.5  Maintain anti-hyperglycemic dose as follows-   - endocrine consulted for insulin pump management. On home insulin pump     Hyperlipidemia associated with type 2 diabetes mellitus  - continue statin  S/P cadaveric kidney transplant 11/5/2016. ESRD secondary to HTN/DMII  Immunocompromised state due to drug therapy  Long-term use of immunosuppressant medication  - continue home tacro 1 mg BID  - KTM consulted  - daily BMP, daily tacro levels   Paroxysmal atrial fibrillation  Long term (current) use of anticoagulants  Patient has paroxysmal (<7 days) atrial fibrillation. Patient is currently in atrial fibrillation. [id number] Score: 3. The patients heart rate in the last 24 hours is as follows:  Pulse  Min: 58  Max: 64  Antiarrhythmics     Anticoagulants       Plan  - Replete lytes with a goal of K>4, Mg >2  - Patient is anticoagulated, see medications listed above.  - Patient's afib is currently controlled  - hold bb given concerns of latha cardia and pauses on tele. Will follow up with cardiology outpatient.   Thrombocytopenia, unspecified  The patients 3 most recent labs are listed below.  Recent Labs      07/22/25  1610        Plan  - Will transfuse if platelet count is <10k.  - daily CBC   Anemia of chronic disease  Anemia is likely due to chronic disease due to Chronic Kidney Disease. Most recent hemoglobin and hematocrit are listed below.  Recent Labs     07/22/25  1610   HGB 8.5*   HCT 27.6*     Plan  - Monitor serial CBC: Daily  - Transfuse PRBC if patient becomes hemodynamically unstable, symptomatic or H/H drops below 7/21.  - Patient has not received any PRBC transfusions to date  - Patient's anemia is currently stable  History of CVA (cerebrovascular accident)  - noted  - continue home statin and asa   Hypokalemia  Patient's most recent potassium results are listed below.   Recent Labs     07/22/25  1610   K 3.4*     Plan  - Replete potassium per protocol  - Monitor potassium Daily  - Patient's hypokalemia is stable  Hypertension associated with stage 4 chronic kidney disease due to type 2 diabetes mellitus  Uncontrolled on admission  - as above   - vitals q4h   PVD (peripheral vascular disease)  -ABIs ordered  -pulses on doppler found. No acute concerns     Hypercoagulable state due to paroxysmal atrial fibrillation  Patient has paroxysmal (<7 days) atrial fibrillation. Patient is currently in atrial fibrillation. [id number] Score: 3. The patients heart rate in the last 24 hours is as follows:  Pulse  Min: 58  Max: 64     Antiarrhythmics       Anticoagulants       Plan  - Replete lytes with a goal of K>4, Mg >2  -   - Patient's afib is currently controlled        Pleural effusion      Pneumonia  -noted on CT findings   -continue Flagyl and Rocephin   -pulm consulted per ktm recs for recurrent PNA     Final Active Diagnoses:    Diagnosis Date Noted POA    PRINCIPAL PROBLEM:  Acute on chronic diastolic congestive heart failure [I50.33] 06/03/2025 Yes    PVD (peripheral vascular disease) [I73.9] 07/15/2025 Yes    Pneumonia [J18.9] 07/14/2025 Yes    Left renal mass [N28.89] 07/14/2025 Yes    Hypertension  associated with stage 4 chronic kidney disease due to type 2 diabetes mellitus [E11.22, I12.9, N18.4] 07/13/2025 Yes    Community acquired bacterial pneumonia [J15.9] 07/13/2025 Yes    Hypertensive emergency [I16.1] 07/13/2025 Yes    Paroxysmal atrial fibrillation [I48.0] 06/12/2025 Yes    Long-term use of immunosuppressant medication [Z79.60] 06/06/2025 Not Applicable    Acute kidney injury superimposed on stage 4 chronic kidney disease [N17.9, N18.4] 06/03/2025 Yes    Insulin pump in place [Z96.41] 05/06/2025 Not Applicable    Pleural effusion [J90] 05/02/2025 Yes    Hypokalemia [E87.6] 02/20/2025 Yes    History of CVA (cerebrovascular accident) [Z86.73] 01/30/2025 Not Applicable    Long term (current) use of anticoagulants [Z79.01] 07/10/2024 Not Applicable    Anemia of chronic disease [D63.8] 06/26/2024 Yes    Thrombocytopenia, unspecified [D69.6] 04/01/2024 Yes    Hypercoagulable state due to paroxysmal atrial fibrillation [D68.69, I48.0] 08/09/2019 Yes    CKD IV (severe) [N18.4] 12/28/2016 Yes    Immunocompromised state due to drug therapy [D84.821, Z79.899] 11/07/2016 Not Applicable    S/P cadaveric kidney transplant 11/5/2016. ESRD secondary to HTN/DMII [Z94.0] 11/05/2016 Not Applicable    Hyperlipidemia associated with type 2 diabetes mellitus [E11.69, E78.5] 07/29/2014 Yes    Type 2 diabetes mellitus with stage 4 chronic kidney disease, with long-term current use of insulin [E11.22, N18.4, Z79.4] 07/29/2014 Not Applicable      Problems Resolved During this Admission:    Diagnosis Date Noted Date Resolved POA    Hyponatremia [E87.1] 02/20/2025 07/14/2025 Yes       Discharged Condition: good    Disposition: [place]Health [place] [facility] [place]    Follow Up:   Follow-up Information       ProviderKenneth .    Specialty: Internal Medicine  Contact information:  [address] [facility]  [address]  [place] [address]                           Patient Instructions:      Ambulatory referral/consult to [facility] Acute Care at Home  "  Standing Status: Future   Referral Priority: Routine Referral Type: Consultation   Referred to Provider: PRABHU PROVIDER Requested Specialty: Internal Medicine   Number of Visits Requested: 1     Ambulatory referral/consult to Urology   Standing Status: Future   Referral Priority: Routine Referral Type: Consultation   Referral Reason: Specialty Services Required   Requested Specialty: Urology   Number of Visits Requested: 1       Significant Diagnostic Studies: Labs: BMP:   Recent Labs   Lab 07/22/25  1610   *   *   K 3.4*      CO2 22*   BUN 70*   CREATININE 4.0*   CALCIUM 9.1   MG 1.7       Pending Diagnostic Studies:       None           Medications:  Reconciled Home Medications:      Medication List        CONTINUE taking these medications      acetaminophen 500 MG tablet  Commonly known as: TYLENOL  Take 500 mg by mouth every 6 (six) hours as needed for Pain.     aspirin 81 mg Tab  Take 81 mg by mouth once daily.     atorvastatin 80 MG tablet  Commonly known as: LIPITOR  Take 1 tablet (80 mg total) by mouth once daily.     BD ULTRA-FINE LO PEN NEEDLE 32 gauge x 5/32" Ndle  Generic drug: pen needle, diabetic  USE TO ADMINISTER INSULIN 4 TIMES DAILY.     DEXCOM G7 SENSOR Kayla  Generic drug: blood-glucose sensor  Change sensor every 10 days.     doxazosin 8 MG Tab  Commonly known as: CARDURA  Take 1 tablet (8 mg total) by mouth once daily.     famotidine 20 MG tablet  Commonly known as: PEPCID  Take 1 tablet by mouth every evening.     FeroSuL 325 mg (65 mg iron) Tab tablet  Generic drug: ferrous sulfate  Take 1 tablet (325 mg total) by mouth daily with breakfast.     gabapentin 300 MG capsule  Commonly known as: NEURONTIN  Take 1 capsule (300 mg total) by mouth 2 (two) times daily.     isosorbide-hydrALAZINE 20-37.5 mg 20-37.5 mg Tab  Commonly known as: BIDIL  Take 2 tablets by mouth 3 (three) times daily.     JARDIANCE 10 mg tablet  Generic drug: empagliflozin  Take 1 tablet (10 mg total) " by mouth once daily.     magnesium oxide 400 mg (241.3 mg magnesium) tablet  Commonly known as: MAG-OX  Take 1 tablet (400 mg total) by mouth 2 (two) times daily.     meclizine 25 mg tablet  Commonly known as: ANTIVERT  Take 1 tablet (25 mg total) by mouth 3 (three) times daily as needed for Dizziness.     melatonin 3 mg Cap  Take 2 capsules by mouth nightly as needed (Insomnia).     metoprolol tartrate 25 MG tablet  Commonly known as: LOPRESSOR  Take 1 tablet (25 mg total) by mouth 2 (two) times daily.     MIRALAX 17 gram Pwpk  Generic drug: polyethylene glycol  Take 17 g by mouth daily as needed for Constipation. Take 17 gm (mixed in liquid) by mouth every day.     mycophenolate 250 mg Cap  Commonly known as: CELLCEPT  Take 500 mg by mouth 2 (two) times daily.     * NovoLOG Flexpen U-100 Insulin 100 unit/mL (3 mL) Inpn pen  Generic drug: insulin aspart U-100  Use pump. Max daily 100 units     * NovoLOG U-100 Insulin aspart 100 unit/mL injection  Generic drug: insulin aspart U-100  Use in pump, max daily 100 units.     OMNIPOD 5 G6-G7 PODS (GEN 5) Crtg  Generic drug: insulin pump cart,auto,BT,G6/7  Change every 48 hours.     predniSONE 5 MG tablet  Commonly known as: DELTASONE  Take 1 tablet (5 mg total) by mouth once daily.     tacrolimus 1 MG Cap  Commonly known as: PROGRAF  Take 1 capsule (1 mg total) by mouth every 12 (twelve) hours.     torsemide 20 MG Tab  Commonly known as: DEMADEX  Take 2 tablets (40 mg total) by mouth once daily.     TRUE METRIX GLUCOSE TEST STRIP Strp  Generic drug: blood sugar diagnostic  Use to check blood glucose 1 times daily, to use with insurance preferred meter     TRUEPLUS LANCETS 30 gauge Misc  Generic drug: lancets  Use to check blood glucose 1 times daily, to use with insurance preferred meter     VITAMIN D2 1,250 mcg (50,000 unit) capsule  Generic drug: ergocalciferol  Take 1 capsule (50,000 Units total) by mouth every 7 days.     XARELTO 15 mg Tab  Generic drug:  rivaroxaban  Take 1 tablet (15 mg total) by mouth daily with dinner or evening meal.           * This list has 2 medication(s) that are the same as other medications prescribed for you. Read the directions carefully, and ask your doctor or other care provider to review them with you.                ASK your doctor about these medications      levoFLOXacin 500 MG tablet  Commonly known as: LEVAQUIN  Take 1 tablet (500 mg total) by mouth once daily. for 2 days  Ask about: Should I take this medication?              Indwelling Lines/Drains at time of discharge:   Lines/Drains/Airways       Line  Duration                  Subcutaneous Infusion Pump 07/15/25 Abdomen (Comment) 7 days                        Time spent on the discharge of patient: 15 minutes         Hiram Mcgrath MD  Department of Hospital Medicine  [facility] - Acute Medical Stepdown

## 2025-07-23 NOTE — ASSESSMENT & PLAN NOTE
Anemia is likely due to chronic disease due to Chronic Kidney Disease. Most recent hemoglobin and hematocrit are listed below.  Recent Labs     07/22/25  1610   HGB 8.5*   HCT 27.6*     Plan  - Monitor serial CBC: Daily  - Transfuse PRBC if patient becomes hemodynamically unstable, symptomatic or H/H drops below 7/21.  - Patient has not received any PRBC transfusions to date  - Patient's anemia is currently stable

## 2025-07-23 NOTE — PLAN OF CARE
Problem: Hospitalized Older Adult  Goal: Optimal Cognitive Function  Outcome: Adequate for Care Transition  Goal: Effective Bowel Elimination  Outcome: Adequate for Care Transition  Goal: Optimal Coping  Outcome: Adequate for Care Transition  Goal: Fluid and Electrolyte Balance  Outcome: Adequate for Care Transition  Goal: Optimal Functional Ability  Outcome: Adequate for Care Transition  Goal: Improved Oral Intake  Outcome: Adequate for Care Transition  Goal: Adequate Sleep/Rest  Outcome: Adequate for Care Transition  Goal: Effective Urinary Elimination  Outcome: Adequate for Care Transition     Problem: Adult Inpatient Plan of Care  Goal: Plan of Care Review  Outcome: Adequate for Care Transition  Goal: Patient-Specific Goal (Individualized)  Outcome: Adequate for Care Transition  Goal: Absence of Hospital-Acquired Illness or Injury  Outcome: Adequate for Care Transition  Goal: Optimal Comfort and Wellbeing  Outcome: Adequate for Care Transition  Goal: Readiness for Transition of Care  Outcome: Adequate for Care Transition     Problem: Infection  Goal: Absence of Infection Signs and Symptoms  Outcome: Adequate for Care Transition     Problem: Diabetes Comorbidity  Goal: Blood Glucose Level Within Targeted Range  Outcome: Adequate for Care Transition     Problem: Acute Kidney Injury/Impairment  Goal: Fluid and Electrolyte Balance  Outcome: Adequate for Care Transition  Goal: Improved Oral Intake  Outcome: Adequate for Care Transition  Goal: Effective Renal Function  Outcome: Adequate for Care Transition     Problem: Pneumonia  Goal: Fluid Balance  Outcome: Adequate for Care Transition  Goal: Resolution of Infection Signs and Symptoms  Outcome: Adequate for Care Transition  Goal: Effective Oxygenation and Ventilation  Outcome: Adequate for Care Transition

## 2025-07-23 NOTE — SUBJECTIVE & OBJECTIVE
Interval HPI:   Overnight events: No acute events overnight. Patient in room 1125/1125 A. Blood glucose stable. BG at goal on current insulin regimen (Home Insulin Pump). Steroid use- Prednisone 5 mg.    Renal function- Abnormal - Creatinine 3.8   Vasopressors-  None       Endocrine will continue to follow and manage insulin orders inpatient.         Diet Low Sodium (2 gm) Fluid - 1500mL     Eatin%  Nausea: No  Hypoglycemia and intervention: No  Fever: No  TPN and/or TF: No    PMH, PSH, FH, SH updated and reviewed     ROS:  Review of Systems   Constitutional:  Negative for unexpected weight change.   Eyes:  Negative for visual disturbance.   Respiratory:  Negative for cough.    Cardiovascular:  Negative for chest pain.   Gastrointestinal:  Negative for nausea and vomiting.   Endocrine: Negative for polydipsia and polyuria.   Musculoskeletal:  Negative for back pain.   Skin:  Negative for rash.   Neurological:  Negative for syncope.   Psychiatric/Behavioral:  Negative for agitation and dysphoric mood.        Current Medications and/or Treatments Impacting Glycemic Control  Immunotherapy:    Immunosuppressants           Stop Route Frequency     tacrolimus capsule 1 mg         -- Oral 2 times daily          Steroids:   Hormones (From admission, onward)      Start     Stop Route Frequency Ordered    25 0900  predniSONE tablet 5 mg         -- Oral Daily 25  melatonin tablet 6 mg         -- Oral Nightly PRN 25          Pressors:    Autonomic Drugs (From admission, onward)      None          Hyperglycemia/Diabetes Medications:   Antihyperglycemics (From admission, onward)      Start     Stop Route Frequency Ordered    25 0900  empagliflozin (Jardiance) tablet 10 mg        Question Answer Comment   Does this patient have a diagnosis of heart failure? Yes    Does this patient have type 1 diabetes or diabetic ketoacidosis? No    Does this patient have symptomatic  hypotension? No    Is the patient NPO or pending major surgery in next 3 days or less? No        -- Oral Daily 07/22/25 2323    07/22/25 1930  insulin aspart U-100 insulin pump from home        Question Answer Comment   Target number 110    Basal Rate #1 0.6    Basal rate #1 time 0000        -- SubQ Continuous 07/22/25 1926    07/22/25 1922  insulin aspart U-100 insulin pump from home 0-10 Units        Question Answer Comment   Target number 110    Carbohydrate coverage #1 7.5    Carbohydrate coverage #1 time 0000    Sensitivity #1 40    Sensitivity #1 time 0000        -- SubQ As needed (PRN) 07/22/25 1926             PHYSICAL EXAMINATION:  Vitals:    07/23/25 1014   BP: (!) 168/77   Pulse:    Resp:    Temp:      Body mass index is 25.07 kg/m².     Physical Exam  Constitutional:       Appearance: He is well-developed.   HENT:      Head: Normocephalic.   Eyes:      Conjunctiva/sclera: Conjunctivae normal.   Pulmonary:      Effort: Pulmonary effort is normal.   Musculoskeletal:         General: Normal range of motion.   Skin:     General: Skin is warm.      Findings: No rash.   Neurological:      Mental Status: He is alert and oriented to person, place, and time.

## 2025-07-23 NOTE — PLAN OF CARE
[facility] - Internal Medicine Telemetry  Initial Discharge Assessment       Primary Care Provider: Mima Mack MD    Admission Diagnosis: Screening for cardiovascular condition [Z13.6]  Anemia of chronic disease [D63.8]  Generalized weakness [R53.1]  Chest pain [R07.9]  Fever, unspecified fever cause [R50.9]  Febrile illness [R50.9]    Admission Date: 7/22/2025  Expected Discharge Date: 7/23/2025    Transition of Care Barriers: (P) None    Payor: HU[employer] MANAGED MEDICARE / Plan: HU[employer] MEDICARE HMO / Product Type: Capitation /     Extended Emergency Contact Information  Primary Emergency Contact: Richa cortez  Mobile Phone: [phone]  Relation: Sister  Secondary Emergency Contact: Erika Zamorano  Address: [address] [place], [place] [id number] [place] [address]  Home Phone: [phone]  Mobile Phone: [phone]  Relation: Spouse    Discharge Plan A: (P) Home with family  Discharge Plan B: (P) Home with family, Home Health      [facility]  [address] [facility] [address]  Phone: [phone] Fax: [phone]    [facility] [id number] [address]  Phone: [phone] Fax: [phone]      Initial Assessment (most recent)       Adult Discharge Assessment - 07/23/25 1602          Discharge Assessment    Assessment Type Discharge Planning Assessment     Confirmed/corrected address, phone number and insurance Yes     Confirmed Demographics Correct on Facesheet     Source of Information patient     Communicated UBALDO with patient/caregiver Yes     People in Home spouse     Name(s) of People in Home Erika (spouse) (P)      Do you expect to return to your current living situation? Yes (P)      Do you have help at home or someone to help you manage your care at home? No (P)      Who are your caregiver(s) and their phone number(s)? Erika(spouse)  (P)      Prior to hospitilization cognitive status: Alert/Oriented (P)      Current cognitive status: Alert/Oriented (P)      Walking or Climbing Stairs Difficulty no (P)      Walking or Climbing Stairs ambulation difficulty, requires equipment (P)      Mobility Management Cane (P)      Dressing/Bathing Difficulty no (P)      Home Accessibility stairs to enter home (P)      Number of Stairs, Main Entrance one (P)      Home Layout Able to live on 1st floor (P)      Equipment Currently Used at Home cane, straight (P)      Readmission within 30 days? No (P)      Patient currently being followed by outpatient case management? No (P)      Do you currently have service(s) that help you manage your care at home? No (P)      Do you take prescription medications? Yes (P)      Do you have prescription coverage? Yes (P)      Do you have any problems affording any of your prescribed medications? No (P)      Is the patient taking medications as prescribed? yes (P)      Who is going to help you get home at discharge? Family (P)      How do you get to doctors appointments? car, drives self;family or friend will provide (P)      Are you on dialysis? No (P)      Do you take coumadin? No (P)      Discharge Plan A Home with family (P)      Discharge Plan B Home with family;Home Health (P)      DME Needed Upon Discharge  none (P)      Discharge Plan discussed with: Patient (P)      Transition of Care Barriers None (P)      Does patient/caregiver understand observation status Yes (P)         Physical Activity    On average, how many days per week do you engage in moderate to strenuous exercise (like a brisk walk)? 4 days (P)      On average, how many minutes do you engage in exercise at this level? 20 min (P)         Financial Resource Strain    How hard is it for you to pay for the very basics like food, housing, medical care, and heating? Not hard at all (P)         Housing Stability    In the last 12 months, was there a time when you  were not able to pay the mortgage or rent on time? No (P)      At any time in the past 12 months, were you homeless or living in a shelter (including now)? No (P)         Transportation Needs    In the past 12 months, has lack of transportation kept you from medical appointments or from getting medications? No (P)      In the past 12 months, has lack of transportation kept you from meetings, work, or from getting things needed for daily living? No (P)         Food Insecurity    Within the past 12 months, you worried that your food would run out before you got the money to buy more. Never true (P)      Within the past 12 months, the food you bought just didn't last and you didn't have money to get more. Never true (P)         Stress    Do you feel stress - tense, restless, nervous, or anxious, or unable to sleep at night because your mind is troubled all the time - these days? Not at all (P)         Social Isolation    How often do you feel lonely or isolated from those around you?  Never (P)         Alcohol Use    Q1: How often do you have a drink containing alcohol? Never (P)      Q2: How many drinks containing alcohol do you have on a typical day when you are drinking? Patient does not drink (P)      Q3: How often do you have six or more drinks on one occasion? Never (P)         Utilities    In the past 12 months has the electric, gas, oil, or water company threatened to shut off services in your home? No (P)         Health Literacy    How often do you need to have someone help you when you read instructions, pamphlets, or other written material from your doctor or pharmacy? Never (P)                       Discharge Plan A and Plan B have been determined by review of patient's clinical status, future medical and therapeutic needs, and coverage/benefits for post-acute care in coordination with multidisciplinary team members.    Hong Morris RN-CM  Case Management  [facility]  [phone]

## 2025-07-23 NOTE — ASSESSMENT & PLAN NOTE
Suspect etiology cardiorenal.    - Cr 3.9.  Baseline Cr 3.1   -CR close to baseline   - UA unremarkable   - renal transplant US ordered. Renal US with renal mass   - nephrology consulted, appreciate recommendations   - Monitor UOP and follow serial BMP   - Adjust drugs to GFR/CrCL; avoid nephrotoxic drugs   -  Estimated Creatinine Clearance: 20.3 mL/min (A) (based on SCr of 4 mg/dL (H)).   - Monitor electrolytes, phos levels daily  -improving with diuresis and BP control

## 2025-07-23 NOTE — ASSESSMENT & PLAN NOTE
Patient has paroxysmal (<7 days) atrial fibrillation. Patient is currently in atrial fibrillation. [id number] Score: 3. The patients heart rate in the last 24 hours is as follows:  Pulse  Min: 58  Max: 64  Antiarrhythmics     Anticoagulants       Plan  - Replete lytes with a goal of K>4, Mg >2  - Patient is anticoagulated, see medications listed above.  - Patient's afib is currently controlled  - hold bb given concerns of latha cardia and pauses on tele. Will follow up with cardiology outpatient.

## 2025-07-23 NOTE — ASSESSMENT & PLAN NOTE
Patient has paroxysmal (<7 days) atrial fibrillation. Patient is currently in atrial fibrillation. [id number] Score: 3. The patients heart rate in the last 24 hours is as follows:  Pulse  Min: 58  Max: 64     Antiarrhythmics       Anticoagulants       Plan  - Replete lytes with a goal of K>4, Mg >2  -   - Patient's afib is currently controlled

## 2025-07-23 NOTE — ASSESSMENT & PLAN NOTE
Patient has paroxysmal (<7 days) atrial fibrillation. Patient is currently in atrial fibrillation. TNVRC6XQQs Score: 3. The patients heart rate in the last 24 hours is as follows:  Pulse  Min: 55  Max: 72     Antiarrhythmics  metoprolol tartrate (LOPRESSOR) tablet 25 mg, 2 times daily, Oral    Anticoagulants  rivaroxaban tablet 15 mg, With dinner, Oral    Plan  - Replete lytes with a goal of K>4, Mg >2  - Patient is anticoagulated, see medications listed above.  - Patient's afib is currently controlled

## 2025-07-23 NOTE — CONSULTS
[facility] - Internal Medicine Telemetry  Nephrology  Consult Note    Patient Name: Ravi Zamorano  MRN: [id number]  Admission Date: 7/22/2025  Hospital Length of Stay: 1 days  Attending Provider: Hiram Mcgrath MD   Primary Care Physician: Mima Mack MD  Principal Problem:Febrile illness    Inpatient consult to Kidney/Pancreas Transplant Medicine  Consult performed by: Norberto Rand MD  Consult ordered by: Jennifer Coreas MD  Reason for consult: IS managment, PHUONG        Subjective:     HPI: Ravi is a pleasant 68 yo man w pmhx significant for ESRD 2/2 HTN/DM2 s/p DDKT 11/5/2016 BL Cr 2-3, Afib, HFpEF, HTN, HLD, PAD, IDDM, CVA. He was just here recently in the hospital last week, diagnosed w PNA and seen by pulmonology and ID. He presents with similar symptoms fever and weakness, no SOB. Cr up to 3.8. BNP 4520, Hg 8.6, BUN 70. SBP hypertensive in the 170s. 1+ protein in urine. KTM service consulted for IS management and PHUONG.     Past Medical History:   Diagnosis Date    Anxiety 07/29/2014    Arthritis     atrial fibrillation     History of cardioversion    Bilateral diabetic retinopathy 2017    Cardioembolic stroke 12/07/2024    almost completely resolved - very mild left hand weakness    CKD (chronic kidney disease)     in transplanted kidney    Colon polyps 2014    Depression - situational 07/29/2014    Diabetes type 2 2000    since 2000.  c/b neuropathy, CKD    Encounter for blood transfusion     Febrile illness 07/22/2025    History of hepatitis C, s/p successful Rx w/ SVR24 - 9/2017 07/29/2014    Genotype 1a, treatment naive 10/2014 liver biopsy - grade 1 / stage 1 Completed Harvoni w/ SVR    Hyperlipidemia 07/29/2014    Hypertension     Hyponatremia 02/20/2025    Pancreatitis     S/P cadaveric kidney transplant 11/5/2016. ESRD secondary to HTN/DMII 11/05/2016    Stroke 2024       Past Surgical History:   Procedure Laterality Date    ABLATION OF ARRHYTHMOGENIC FOCUS FOR ATRIAL FIBRILLATION N/A  6/11/2024    Procedure: Ablation atrial fibrillation;  Surgeon: Bronson Bowden MD;  Location: [facility] EP LAB;  Service: Cardiology;  Laterality: N/A;  AF, FELICIANO (cx if SR), PVI, RFA, Carto, Gen, GP, 3 Prep    ABLATION OF ARRHYTHMOGENIC FOCUS FOR ATRIAL FIBRILLATION N/A 6/12/2025    Procedure: Ablation atrial fibrillation;  Surgeon: Bronson Bowden MD;  Location: [facility] EP LAB;  Service: Cardiology;  Laterality: N/A;  AF, FELICIANO (h/o CVA), redo PVI, CTI, RFA, Carto, Gen, GP, 3 Prep *insulin pump*    ABLATION, ATRIAL FLUTTER, ATYPICAL  6/12/2025    Procedure: Ablation, Atrial Flutter, Atypical;  Surgeon: Bronson Bowden MD;  Location: [facility] EP LAB;  Service: Cardiology;;    ABLATION, ATRIAL FLUTTER, TYPICAL  6/11/2024    Procedure: Ablation, Atrial Flutter, Typical;  Surgeon: Bronson Bowden MD;  Location: [facility] EP LAB;  Service: Cardiology;;    ABLATION, ATRIAL FLUTTER, TYPICAL N/A 6/12/2025    Procedure: Ablation, Atrial Flutter, Typical;  Surgeon: Bronson Bowden MD;  Location: [facility] EP LAB;  Service: Cardiology;  Laterality: N/A;    APPENDECTOMY      BONE MARROW BIOPSY Left 6/26/2024    Procedure: Biopsy-bone marrow;  Surgeon: Bridger Zapata MD;  Location: [facility] ENDO ([address] [place] [address]);  Service: Oncology;  Laterality: Left;  6/24-pt knows to hold aspirin and eliquis as instructed by hem/onc, precall complete-Kpvt    CARDIOVERSION  6/11/2024    Procedure: Cardioversion;  Surgeon: Bronson Bowden MD;  Location: [facility] EP LAB;  Service: Cardiology;;    CATARACT EXTRACTION W/  INTRAOCULAR LENS IMPLANT Right 3/13/2024    Procedure: EXTRACTION, CATARACT, WITH IOL INSERTION;  Surgeon: Jennifer Palacio MD;  Location: [facility] OR;  Service: Ophthalmology;  Laterality: Right;    CATARACT EXTRACTION W/  INTRAOCULAR LENS IMPLANT Left 4/10/2024    Procedure: EXTRACTION, CATARACT, WITH IOL INSERTION;  Surgeon: Jennifer Palacio MD;  Location: [facility];  Service: Ophthalmology;  Laterality: Left;    COLONOSCOPY N/A 12/21/2020    Procedure:  COLONOSCOPY;  Surgeon: Tico Bell MD;  Location: [facility] ENDO (4TH FLR);  Service: Endoscopy;  Laterality: N/A;  ok to hold holli per Dr. Valadez, see telephone encounter 11/13/2020-MS  covid test 12/18 Todd    ECHOCARDIOGRAM,TRANSESOPHAGEAL N/A 2/3/2025    Procedure: Transesophageal echo (FELICIANO) intra-procedure log documentation;  Surgeon: Grayson Lin III, MD;  Location: [facility] EP LAB;  Service: Cardiology;  Laterality: N/A;    KIDNEY TRANSPLANT      TRANSESOPHAGEAL ECHOCARDIOGRAPHY N/A 8/26/2019    Procedure: ECHOCARDIOGRAM, TRANSESOPHAGEAL;  Surgeon: [facility] Diagnostic Provider;  Location: [facility] EP LAB;  Service: Cardiology;  Laterality: N/A;    TRANSESOPHAGEAL ECHOCARDIOGRAPHY N/A 6/11/2024    Procedure: ECHOCARDIOGRAM, TRANSESOPHAGEAL;  Surgeon: Grayson Lin III, MD;  Location: [facility] EP LAB;  Service: Cardiology;  Laterality: N/A;    TRANSESOPHAGEAL ECHOCARDIOGRAPHY N/A 6/12/2025    Procedure: ECHOCARDIOGRAM, TRANSESOPHAGEAL;  Surgeon: Breezy Nguyen MD;  Location: [facility] EP LAB;  Service: Cardiology;  Laterality: N/A;    TREATMENT OF CARDIAC ARRHYTHMIA N/A 8/26/2019    Procedure: CARDIOVERSION;  Surgeon: Bronson Bowden MD;  Location: [facility] EP LAB;  Service: Cardiology;  Laterality: N/A;  AF, FELICIANO, DCCV, MAC, GP, 3 PREP    TREATMENT OF CARDIAC ARRHYTHMIA N/A 2/3/2025    Procedure: Cardioversion or Defibrillation;  Surgeon: Bronson Bowden MD;  Location: [facility] EP LAB;  Service: Cardiology;  Laterality: N/A;  AF, FELICIANO, DCCV, MAC, GP, 3 Prep       Review of patient's allergies indicates:   Allergen Reactions    Nifedipine Other (See Comments)     Gingival hyperplasia     Current Facility-Administered Medications   Medication Frequency    acetaminophen tablet 650 mg Q4H PRN    albuterol-ipratropium 2.5 mg-0.5 mg/3 mL nebulizer solution 3 mL Q6H PRN    aluminum-magnesium hydroxide-simethicone 200-200-20 mg/5 mL suspension 30 mL QID PRN    aspirin chewable tablet 81 mg Daily    atorvastatin tablet 80 mg  Daily    dextrose 50% injection 12.5 g PRN    dextrose 50% injection 25 g PRN    doxazosin tablet 16 mg Daily    empagliflozin (Jardiance) tablet 10 mg Daily    [START ON 2025] epoetin tanya injection 4,000 Units Q7 Days    ergocalciferol capsule 50,000 Units Q7 Days    famotidine tablet 20 mg Daily PRN    ferrous sulfate tablet 1 each Daily    furosemide tablet 60 mg BID    gabapentin capsule 300 mg BID    glucagon (human recombinant) injection 1 mg PRN    glucose chewable tablet 16 g PRN    glucose chewable tablet 24 g PRN    hydrALAZINE 10 mg 2 tablets, hydrALAZINE 25 mg 2 tablets, isorsorbide dinitrate 20 mg  2 tablets combination TID    insulin aspart U-100 insulin pump from home 0-10 Units PRN    insulin aspart U-100 insulin pump from home Continuous    magnesium oxide tablet 400 mg BID    meclizine tablet 25 mg TID PRN    melatonin tablet 6 mg Nightly PRN    metoprolol tartrate (LOPRESSOR) tablet 25 mg BID    naloxone 0.4 mg/mL injection 0.02 mg PRN    ondansetron disintegrating tablet 8 mg Q8H PRN    polyethylene glycol packet 17 g Daily    potassium chloride SA CR tablet 20 mEq Daily    predniSONE tablet 5 mg Daily    rivaroxaban tablet 15 mg Daily with dinner    sodium chloride 0.9% flush 10 mL PRN    tacrolimus capsule 1 mg BID    valsartan tablet 160 mg BID     Family History       Problem Relation (Age of Onset)    Cancer Brother    Diabetes Mother    Heart disease Mother, Father    Hypertension Mother, Brother          Tobacco Use    Smoking status: Former     Current packs/day: 0.00     Average packs/day: 0.5 packs/day for 32.0 years (16.0 ttl pk-yrs)     Types: Cigarettes     Start date: 1984     Quit date: 2016     Years since quittin.6    Smokeless tobacco: Never   Vaping Use    Vaping status: Never Used   Substance and Sexual Activity    Alcohol use: Not Currently    Drug use: No    Sexual activity: Yes     Partners: Female     Review of Systems   Constitutional:  Positive for  fever.   Respiratory:  Negative for shortness of breath.    Cardiovascular:  Negative for chest pain.   Gastrointestinal:  Negative for abdominal pain.   Neurological:  Positive for weakness.     Objective:     Vital Signs (Most Recent):  Temp: 98.4 °F (36.9 °C) (07/23/25 0404)  Pulse: (!) 59 (07/23/25 0404)  Resp: 18 (07/23/25 0404)  BP: (!) 176/81 (FAHEEM Laguna PA-C notified, meds given) (07/23/25 0404)  SpO2: 97 % (07/23/25 0404) Vital Signs (24h Range):  Temp:  [97.8 °F (36.6 °C)-98.7 °F (37.1 °C)] 98.4 °F (36.9 °C)  Pulse:  [52-72] 59  Resp:  [16-18] 18  SpO2:  [95 %-100 %] 97 %  BP: (170-189)/(72-87) 176/81     Weight: 86.2 kg (190 lb) (07/22/25 1445)  Body mass index is 25.07 kg/m².  Body surface area is 2.11 meters squared.    I/O last 3 completed shifts:  In: 400 [P.O.:300; IV Piggyback:100]  Out: 350 [Urine:350]     Physical Exam  HENT:      Head: Normocephalic.   Cardiovascular:      Rate and Rhythm: Normal rate.      Heart sounds: Normal heart sounds.   Pulmonary:      Breath sounds: Normal breath sounds.   Abdominal:      Palpations: Abdomen is soft.   Neurological:      Mental Status: He is alert. Mental status is at baseline.   Psychiatric:         Mood and Affect: Mood normal.          Significant Labs:  CBC:   Recent Labs   Lab 07/23/25 0426   WBC 7.32   RBC 4.05*   HGB 8.6*   HCT 28.0*      MCV 69*   MCH 21.2*   MCHC 30.7*     CMP:   Recent Labs   Lab 07/22/25  1610 07/23/25 0426   * 92   CALCIUM 9.1 9.0   ALBUMIN 2.6*  --    PROT 6.6  --    * 137   K 3.4* 3.7   CO2 22* 20*    106   BUN 70* 70*   CREATININE 4.0* 3.8*   ALKPHOS 57  --    ALT 8*  --    AST 20  --    BILITOT 0.3  --      All labs within the past 24 hours have been reviewed.  Assessment/Plan:   PHUONG on CKD4 s/p kidney transplant 2016 BL Cr 2.6-3.1  CAP  HFpEF  IDDM2  Afib long term AC xarelto + AICD  Long term drug induced immunosuppression  L renal mass - will get FU w urology/onc in outpatient  JING     -At  previous admission it was thought PHUONG 2/2 CRS vs HTN crisis. BL Cr 2.6-3.1. He was given diuresis w improvement in Cr. Now Cr back up to 3.8. Biopsy had previously been discussed but w low allosure and his extensive hx of DM2 and HTN and stable Cr it was deferred. Now with worsening Cr in last few months, but not sure how much benefit there would be to renal biopsy.   -daily prograf level. On tac 1/1, prednisone 5 mg qd, 500 mg bid MMF at home. Currently holding MMF d/t concern for infection from fever.  -US transplant from previous admission reviewed  -urine studies  -urine micro when a sample is available  -consider eval w pulm for bronch (they had rec outpt bronch but he keeps coming back to hosp for fevers)  -once dc he will need tight BP and sugar control and outpatient FU w nephrology    Thank you for your consult. I will follow-up with patient. Please contact us if you have any additional questions.    Norberto Rand MD  Nephrology  [facility] - Internal Medicine Telemetry

## 2025-07-23 NOTE — ASSESSMENT & PLAN NOTE
Initial work up non-revealing  Blood cultures pending  Given vanc and zosyn in there ER  ID consult  Hold cellcept (has been held since last admission)

## 2025-07-23 NOTE — ASSESSMENT & PLAN NOTE
Creatine stable for now. BMP reviewed- noted Estimated Creatinine Clearance: 20.3 mL/min (A) (based on SCr of 4 mg/dL (H)). according to latest data. Based on current GFR, CKD stage is stage 4 - GFR 15-29.  Monitor UOP and serial BMP and adjust therapy as needed. Renally dose meds. Avoid nephrotoxic medications and procedures.    Lab Results   Component Value Date    HGBA1C 6.5 (H) 07/13/2025     Resume insulin drip  Endocrinology consulted

## 2025-07-24 LAB — CRYPTOC AG SER QL IA.RAPID: NEGATIVE

## 2025-07-24 NOTE — PLAN OF CARE
[facility] - Internal Medicine Telemetry  Discharge Final Note    Primary Care Provider: Mima Mack MD    Expected Discharge Date: 7/23/2025    Patient discharged to home.     Patient's bedside nurse and medical team notified of the above.    Discharge Plan A and Plan B have been determined by review of patient's clinical status, future medical and therapeutic needs, and coverage/benefits for post-acute care in coordination with multidisciplinary team members.        Final Discharge Note (most recent)       Final Note - 07/24/25 0945          Final Note    Assessment Type Final Discharge Note (P)      Anticipated Discharge Disposition Home or Self Care (P)      Hospital Resources/Appts/Education Provided Provided patient/caregiver with written discharge plan information;Appointments scheduled and added to AVS (P)         Post-Acute Status    Post-Acute Authorization Other (P)      Other Status No Post-Acute Service Needs (P)      Discharge Delays None known at this time (P)                      Important Message from Medicare                 Future Appointments   Date Time Provider Department Center   7/29/2025  2:30 PM Devin Wood MD [facility] PULMSVC [facility]   7/31/2025 12:20 PM LAB, APPOINTMENT [facility] LAB [facility]   7/31/2025  1:00 PM Aditya Kasper MD [facility] NEPHRO [facility]   7/31/2025  2:00 PM Paris Lujan PA-C [facility]   7/31/2025  2:00 PM [facility] HEART FAILURE NURSE [facility]   7/31/2025  3:30 PM Mckenna Swartz MD [facility] ID [facility]   8/1/2025 10:30 AM Mima Mack MD [facility] IM [facility]   8/4/2025 10:30 AM EPO INJECTION SCHEDULE [facility] NEPHRO [facility]   8/5/2025  3:20 PM Gillies, Connor M, DO [facility] CARDIO [facility]   9/16/2025 10:00 AM EKG, APPT [facility] EKG [facility]   9/16/2025 10:30 AM Mima Lyons NP [facility] ARRHYTH [facility]   9/30/2025  9:30 AM LAB, [facility] LAB [facility]   10/7/2025 11:00 AM Karan Lopez APRN, FNP [facility] IM [facility]   12/17/2025 10:00 AM Argelia Bridges RD [facility] JULITA [facility]       CM scheduled post-discharge follow-up appointment and information added to AVS.     Discharge Plan A and Plan B have been determined by review of patient's clinical status, future medical and therapeutic needs, and coverage/benefits for post-acute care in coordination with multidisciplinary team members.    Hong Morris RN-CM  Case Management  [facility]  [phone]

## 2025-07-25 LAB
1,3 BETA GLUCAN SER QL: NEGATIVE
1,3 BETA GLUCAN SER-MCNC: 46 PG/ML
C IMMITIS AB SER QL IA: NEGATIVE
GALACTOMANNAN AG SERPL IA-ACNC: <0.5 INDEX

## 2025-07-25 NOTE — PROGRESS NOTES
Outpatient Care Management   - Patient Assessment    Patient: Ravi Zamorano  MRN:  [id number]  Date of Service:  7/25/2025  Completed by:  Deepa Lindquist LMSW  Referral Date: 06/04/2025    Reason for Visit   Patient presents with    Other     7/21/2025  1st attempt to complete Post DC assmnt for OPCM, left message.Collaborating with OPCM RN for successful patient contact.   7-22-25- Spoke with pt's wife. Pt is on his way to ED for fever and feeling dizzy. Notified OPCM RN.      OPCM Post Discharge     7/24/2025       Brief Summary:  received a referral from OPCM RN Urvashi Scales for the following Post DC assessment. SW completed post dc assessment with pt via telephone due to his recent hospitalization. Pt reported he is doing better at home. He has assistance from his wife Erika at home. Pt stated he currently has food and declined ordering post DC [employer] meals. Pt's wife provides transportation to MD appmnts. No SDOH needs expressed at this time. Case closed and notified OPCM RN.

## 2025-07-26 LAB
BACTERIA BLD CULT: NORMAL
BACTERIA BLD CULT: NORMAL
M HISTOPLASMA/BLASTOMYCES AG RESULT: NOT DETECTED
M HISTOPLASMA/BLASTOMYCES AG VALUE: NOT DETECTED NG/ML
W TOXOPLASMA IGM ANTIBODY: <3 AU/ML

## 2025-07-27 LAB
BACTERIA BLD CULT: NORMAL
BACTERIA BLD CULT: NORMAL
T GONDII DNA BLD QL NAA+PROBE: NEGATIVE

## 2025-07-28 LAB
ASPERGILLUS AB TITR SER IA: NOT DETECTED {TITER}
B DERMAT AB SER QL ID: NOT DETECTED
BEAKER SEE SCANNED REPORT: NORMAL
COCCIDIOIDES AB SER QL ID: NOT DETECTED
H CAPSUL AB SER QL ID: NOT DETECTED

## 2025-07-29 ENCOUNTER — OFFICE VISIT (OUTPATIENT)
Dept: UROLOGY | Facility: CLINIC | Age: 70
End: 2025-07-29
Payer: MEDICARE

## 2025-07-29 ENCOUNTER — OFFICE VISIT (OUTPATIENT)
Dept: PULMONOLOGY | Facility: CLINIC | Age: 70
End: 2025-07-29
Payer: MEDICARE

## 2025-07-29 VITALS
WEIGHT: 197.56 LBS | HEIGHT: 73 IN | SYSTOLIC BLOOD PRESSURE: 140 MMHG | BODY MASS INDEX: 26.18 KG/M2 | HEART RATE: 53 BPM | HEART RATE: 53 BPM | SYSTOLIC BLOOD PRESSURE: 140 MMHG | BODY MASS INDEX: 26.18 KG/M2 | DIASTOLIC BLOOD PRESSURE: 62 MMHG | HEIGHT: 73 IN | WEIGHT: 197.56 LBS | DIASTOLIC BLOOD PRESSURE: 62 MMHG | OXYGEN SATURATION: 97 %

## 2025-07-29 DIAGNOSIS — Z94.0 S/P KIDNEY TRANSPLANT: ICD-10-CM

## 2025-07-29 DIAGNOSIS — J18.9 PNEUMONIA OF LEFT LOWER LOBE DUE TO INFECTIOUS ORGANISM: ICD-10-CM

## 2025-07-29 DIAGNOSIS — E11.22 TYPE 2 DIABETES MELLITUS WITH STAGE 4 CHRONIC KIDNEY DISEASE, WITH LONG-TERM CURRENT USE OF INSULIN: ICD-10-CM

## 2025-07-29 DIAGNOSIS — N28.89 OTHER SPECIFIED DISORDERS OF KIDNEY AND URETER: ICD-10-CM

## 2025-07-29 DIAGNOSIS — R06.02 SHORTNESS OF BREATH: ICD-10-CM

## 2025-07-29 DIAGNOSIS — E11.00 TYPE 2 DIABETES MELLITUS WITH HYPEROSMOLARITY WITHOUT COMA, WITHOUT LONG-TERM CURRENT USE OF INSULIN: ICD-10-CM

## 2025-07-29 DIAGNOSIS — I73.9 PVD (PERIPHERAL VASCULAR DISEASE): ICD-10-CM

## 2025-07-29 DIAGNOSIS — I73.9 PAD (PERIPHERAL ARTERY DISEASE): ICD-10-CM

## 2025-07-29 DIAGNOSIS — N18.4 TYPE 2 DIABETES MELLITUS WITH STAGE 4 CHRONIC KIDNEY DISEASE, WITH LONG-TERM CURRENT USE OF INSULIN: ICD-10-CM

## 2025-07-29 DIAGNOSIS — I50.33 ACUTE ON CHRONIC DIASTOLIC CONGESTIVE HEART FAILURE: ICD-10-CM

## 2025-07-29 DIAGNOSIS — N28.89 LEFT RENAL MASS: Primary | ICD-10-CM

## 2025-07-29 DIAGNOSIS — J90 PLEURAL EFFUSION: ICD-10-CM

## 2025-07-29 DIAGNOSIS — Z79.4 TYPE 2 DIABETES MELLITUS WITH STAGE 4 CHRONIC KIDNEY DISEASE, WITH LONG-TERM CURRENT USE OF INSULIN: ICD-10-CM

## 2025-07-29 DIAGNOSIS — N28.89 RENAL MASS: ICD-10-CM

## 2025-07-29 PROCEDURE — 4010F ACE/ARB THERAPY RXD/TAKEN: CPT | Mod: CPTII,HCNC,S$GLB, | Performed by: UROLOGY

## 2025-07-29 PROCEDURE — 99215 OFFICE O/P EST HI 40 MIN: CPT | Mod: HCNC,S$GLB,, | Performed by: UROLOGY

## 2025-07-29 PROCEDURE — 3288F FALL RISK ASSESSMENT DOCD: CPT | Mod: CPTII,HCNC,S$GLB, | Performed by: UROLOGY

## 2025-07-29 PROCEDURE — 3078F DIAST BP <80 MM HG: CPT | Mod: CPTII,HCNC,S$GLB, | Performed by: INTERNAL MEDICINE

## 2025-07-29 PROCEDURE — 1160F RVW MEDS BY RX/DR IN RCRD: CPT | Mod: CPTII,HCNC,S$GLB, | Performed by: UROLOGY

## 2025-07-29 PROCEDURE — 1159F MED LIST DOCD IN RCRD: CPT | Mod: CPTII,HCNC,S$GLB, | Performed by: UROLOGY

## 2025-07-29 PROCEDURE — 99214 OFFICE O/P EST MOD 30 MIN: CPT | Mod: HCNC,S$GLB,, | Performed by: INTERNAL MEDICINE

## 2025-07-29 PROCEDURE — 1101F PT FALLS ASSESS-DOCD LE1/YR: CPT | Mod: CPTII,HCNC,S$GLB, | Performed by: INTERNAL MEDICINE

## 2025-07-29 PROCEDURE — 99999 PR PBB SHADOW E&M-EST. PATIENT-LVL V: CPT | Mod: PBBFAC,HCNC,, | Performed by: INTERNAL MEDICINE

## 2025-07-29 PROCEDURE — 4010F ACE/ARB THERAPY RXD/TAKEN: CPT | Mod: CPTII,HCNC,S$GLB, | Performed by: INTERNAL MEDICINE

## 2025-07-29 PROCEDURE — 1101F PT FALLS ASSESS-DOCD LE1/YR: CPT | Mod: CPTII,HCNC,S$GLB, | Performed by: UROLOGY

## 2025-07-29 PROCEDURE — G2211 COMPLEX E/M VISIT ADD ON: HCPCS | Mod: HCNC,S$GLB,, | Performed by: UROLOGY

## 2025-07-29 PROCEDURE — 3008F BODY MASS INDEX DOCD: CPT | Mod: CPTII,HCNC,S$GLB, | Performed by: INTERNAL MEDICINE

## 2025-07-29 PROCEDURE — 1126F AMNT PAIN NOTED NONE PRSNT: CPT | Mod: CPTII,HCNC,S$GLB, | Performed by: UROLOGY

## 2025-07-29 PROCEDURE — 1111F DSCHRG MED/CURRENT MED MERGE: CPT | Mod: CPTII,HCNC,S$GLB, | Performed by: INTERNAL MEDICINE

## 2025-07-29 PROCEDURE — 3288F FALL RISK ASSESSMENT DOCD: CPT | Mod: CPTII,HCNC,S$GLB, | Performed by: INTERNAL MEDICINE

## 2025-07-29 PROCEDURE — 3044F HG A1C LEVEL LT 7.0%: CPT | Mod: CPTII,HCNC,S$GLB, | Performed by: UROLOGY

## 2025-07-29 PROCEDURE — 1126F AMNT PAIN NOTED NONE PRSNT: CPT | Mod: CPTII,HCNC,S$GLB, | Performed by: INTERNAL MEDICINE

## 2025-07-29 PROCEDURE — 1111F DSCHRG MED/CURRENT MED MERGE: CPT | Mod: CPTII,HCNC,S$GLB, | Performed by: UROLOGY

## 2025-07-29 PROCEDURE — 3078F DIAST BP <80 MM HG: CPT | Mod: CPTII,HCNC,S$GLB, | Performed by: UROLOGY

## 2025-07-29 PROCEDURE — 3008F BODY MASS INDEX DOCD: CPT | Mod: CPTII,HCNC,S$GLB, | Performed by: UROLOGY

## 2025-07-29 PROCEDURE — 3066F NEPHROPATHY DOC TX: CPT | Mod: CPTII,HCNC,S$GLB, | Performed by: INTERNAL MEDICINE

## 2025-07-29 PROCEDURE — 3077F SYST BP >= 140 MM HG: CPT | Mod: CPTII,HCNC,S$GLB, | Performed by: UROLOGY

## 2025-07-29 PROCEDURE — 3077F SYST BP >= 140 MM HG: CPT | Mod: CPTII,HCNC,S$GLB, | Performed by: INTERNAL MEDICINE

## 2025-07-29 PROCEDURE — 3044F HG A1C LEVEL LT 7.0%: CPT | Mod: CPTII,HCNC,S$GLB, | Performed by: INTERNAL MEDICINE

## 2025-07-29 PROCEDURE — 99999 PR PBB SHADOW E&M-EST. PATIENT-LVL V: CPT | Mod: PBBFAC,HCNC,, | Performed by: UROLOGY

## 2025-07-29 NOTE — PROGRESS NOTES
Clinic Note  7/29/2025      Subjective:         Chief Complaint:   HPI  Ravi Zamorano is a 69 y.o. male noted to have a 3 x 2.6 x 2.6 cm LUP renal mass on ultrasound on 7/13/2025. CT C/A/P WO on 7/13/2025 showed multiple small pulmonary nodeules, ill-defined left renal lesion.  Multiple co-morbidities- CHF, CVA, DM, PVD, CKD 4, s/p renal transplant in 2016. Creatinine 3.8 (eGFR 17).    Past Medical History:   Diagnosis Date    Anxiety 07/29/2014    Arthritis     atrial fibrillation     History of cardioversion    Bilateral diabetic retinopathy 2017    Cardioembolic stroke 12/07/2024    almost completely resolved - very mild left hand weakness    CKD (chronic kidney disease)     in transplanted kidney    Colon polyps 2014    Depression - situational 07/29/2014    Diabetes type 2 2000    since 2000.  c/b neuropathy, CKD    Encounter for blood transfusion     Febrile illness 07/22/2025    History of hepatitis C, s/p successful Rx w/ SVR24 - 9/2017 07/29/2014    Genotype 1a, treatment naive 10/2014 liver biopsy - grade 1 / stage 1 Completed Harvoni w/ SVR    Hyperlipidemia 07/29/2014    Hypertension     Hyponatremia 02/20/2025    Pancreatitis     S/P cadaveric kidney transplant 11/5/2016. ESRD secondary to HTN/DMII 11/05/2016    Stroke 2024     Family History   Problem Relation Name Age of Onset    Diabetes Mother      Hypertension Mother      Heart disease Mother          CAD with PCI    Heart disease Father      Cancer Brother 2         one sister with breast cancer    Hypertension Brother 2         one sister with HTN and borderline DM    Stroke Neg Hx      Kidney disease Neg Hx      Colon cancer Neg Hx      Esophageal cancer Neg Hx       Social History[1]  Past Surgical History:   Procedure Laterality Date    ABLATION OF ARRHYTHMOGENIC FOCUS FOR ATRIAL FIBRILLATION N/A 6/11/2024    Procedure: Ablation atrial fibrillation;  Surgeon: Bronson Bowden MD;  Location: [facility] LAB;  Service: Cardiology;  Laterality:  N/A;  AF, FELICIANO (cx if SR), PVI, RFA, Carto, Gen, GP, 3 Prep    ABLATION OF ARRHYTHMOGENIC FOCUS FOR ATRIAL FIBRILLATION N/A 6/12/2025    Procedure: Ablation atrial fibrillation;  Surgeon: Bronson Bowden MD;  Location: [facility] EP LAB;  Service: Cardiology;  Laterality: N/A;  AF, FELICIANO (h/o CVA), redo PVI, CTI, RFA, Carto, Gen, GP, 3 Prep *insulin pump*    ABLATION, ATRIAL FLUTTER, ATYPICAL  6/12/2025    Procedure: Ablation, Atrial Flutter, Atypical;  Surgeon: Bronson Bowden MD;  Location: [facility] EP LAB;  Service: Cardiology;;    ABLATION, ATRIAL FLUTTER, TYPICAL  6/11/2024    Procedure: Ablation, Atrial Flutter, Typical;  Surgeon: Bronson Bowden MD;  Location: [facility] EP LAB;  Service: Cardiology;;    ABLATION, ATRIAL FLUTTER, TYPICAL N/A 6/12/2025    Procedure: Ablation, Atrial Flutter, Typical;  Surgeon: Bronson Bowden MD;  Location: [facility] EP LAB;  Service: Cardiology;  Laterality: N/A;    APPENDECTOMY      BONE MARROW BIOPSY Left 6/26/2024    Procedure: Biopsy-bone marrow;  Surgeon: Bridger Zapata MD;  Location: [facility] ([address] [place] [address]);  Service: Oncology;  Laterality: Left;  6/24-pt knows to hold aspirin and eliquis as instructed by hem/onc, precall complete-Kpvt    CARDIOVERSION  6/11/2024    Procedure: Cardioversion;  Surgeon: Bronson Bowden MD;  Location: [facility] EP LAB;  Service: Cardiology;;    CATARACT EXTRACTION W/  INTRAOCULAR LENS IMPLANT Right 3/13/2024    Procedure: EXTRACTION, CATARACT, WITH IOL INSERTION;  Surgeon: Jennifer Palacio MD;  Location: [facility] OR;  Service: Ophthalmology;  Laterality: Right;    CATARACT EXTRACTION W/  INTRAOCULAR LENS IMPLANT Left 4/10/2024    Procedure: EXTRACTION, CATARACT, WITH IOL INSERTION;  Surgeon: Jennifer Palacio MD;  Location: [facility] OR;  Service: Ophthalmology;  Laterality: Left;    COLONOSCOPY N/A 12/21/2020    Procedure: COLONOSCOPY;  Surgeon: Tico Bell MD;  Location: [facility] ([address] [place] [address]);  Service: Endoscopy;  Laterality: N/A;  ok to hold eliquis per Dr. Valadez,  see telephone encounter 11/13/2020-MS  covid test 12/18 [place]    ECHOCARDIOGRAM,TRANSESOPHAGEAL N/A 2/3/2025    Procedure: Transesophageal echo (FELICIANO) intra-procedure log documentation;  Surgeon: Grayson Lin III, MD;  Location: [facility] EP LAB;  Service: Cardiology;  Laterality: N/A;    KIDNEY TRANSPLANT      TRANSESOPHAGEAL ECHOCARDIOGRAPHY N/A 8/26/2019    Procedure: ECHOCARDIOGRAM, TRANSESOPHAGEAL;  Surgeon: [facility] Diagnostic Provider;  Location: [facility] EP LAB;  Service: Cardiology;  Laterality: N/A;    TRANSESOPHAGEAL ECHOCARDIOGRAPHY N/A 6/11/2024    Procedure: ECHOCARDIOGRAM, TRANSESOPHAGEAL;  Surgeon: Grayson Lin III, MD;  Location: [facility] EP LAB;  Service: Cardiology;  Laterality: N/A;    TRANSESOPHAGEAL ECHOCARDIOGRAPHY N/A 6/12/2025    Procedure: ECHOCARDIOGRAM, TRANSESOPHAGEAL;  Surgeon: Breezy Nguyen MD;  Location: [facility] EP LAB;  Service: Cardiology;  Laterality: N/A;    TREATMENT OF CARDIAC ARRHYTHMIA N/A 8/26/2019    Procedure: CARDIOVERSION;  Surgeon: Bronson Bowden MD;  Location: [facility] EP LAB;  Service: Cardiology;  Laterality: N/A;  AF, FELICIANO, DCCV, MAC, GP, 3 PREP    TREATMENT OF CARDIAC ARRHYTHMIA N/A 2/3/2025    Procedure: Cardioversion or Defibrillation;  Surgeon: Bronsno Bowden MD;  Location: [facility] EP LAB;  Service: Cardiology;  Laterality: N/A;  AF, FELICIANO, DCCV, MAC, GP, 3 Prep     Problem List[2]  Review of Systems   Constitutional:  Negative for appetite change, chills, fatigue, fever and unexpected weight change.   HENT:  Negative for nosebleeds.    Respiratory:  Negative for apnea, chest tightness, shortness of breath and wheezing.    Cardiovascular:  Negative for chest pain, palpitations and leg swelling.   Gastrointestinal:  Negative for abdominal distention, abdominal pain, constipation, diarrhea, nausea and vomiting.   Genitourinary:  Negative for dysuria and hematuria.   Musculoskeletal:  Negative for arthralgias and back pain.   Skin:  Negative for pallor.   Neurological:  " Negative for dizziness, seizures and syncope.   Hematological:  Negative for adenopathy.   Psychiatric/Behavioral:  Negative for dysphoric mood.          Objective:      There were no vitals taken for this visit.  Estimated body mass index is 25.07 kg/m² as calculated from the following:    Height as of 25: 6' 1" (1.854 m).    Weight as of 25: 86.2 kg (190 lb).  Physical Exam      Assessment and Plan:   Discussed with Dr. Mateo Fields. Will get MRI W WO with newer contrast agents.Visit after.   code applied: patient requires or will require a continuous, longitudinal, and active collaborative plan of care related to this patient's health condition, the management of which requires the direction of a practitioner with specialized clinical knowledge, skill, and expertise.        Problem List Items Addressed This Visit       Type 2 diabetes mellitus with stage 4 chronic kidney disease, with long-term current use of insulin    S/P cadaveric kidney transplant 2016. ESRD secondary to HTN/DMII    Type 2 diabetes mellitus    PAD (peripheral artery disease)    Acute on chronic diastolic congestive heart failure    Left renal mass - Primary    PVD (peripheral vascular disease)       Follow up:       Mateo Diez             [1]   Social History  Socioeconomic History    Marital status:    Occupational History     Employer: [employer]   Tobacco Use    Smoking status: Former     Current packs/day: 0.00     Average packs/day: 0.5 packs/day for 32.0 years (16.0 ttl pk-yrs)     Types: Cigarettes     Start date: 1984     Quit date: 2016     Years since quittin.6    Smokeless tobacco: Never   Vaping Use    Vaping status: Never Used   Substance and Sexual Activity    Alcohol use: Not Currently    Drug use: No    Sexual activity: Yes     Partners: Female   Social History Narrative     for 14 years     for 38 years    2 children ages 41, 42     Social " Drivers of Health     Financial Resource Strain: Low Risk  (7/23/2025)    Overall Financial Resource Strain (CARDIA)     Difficulty of Paying Living Expenses: Not hard at all   Food Insecurity: No Food Insecurity (7/23/2025)    Hunger Vital Sign     Worried About Running Out of Food in the Last Year: Never true     Ran Out of Food in the Last Year: Never true   Transportation Needs: No Transportation Needs (7/23/2025)    PRAPARE - Transportation     Lack of Transportation (Medical): No     Lack of Transportation (Non-Medical): No   Physical Activity: Insufficiently Active (7/23/2025)    Exercise Vital Sign     Days of Exercise per Week: 4 days     Minutes of Exercise per Session: 20 min   Stress: No Stress Concern Present (7/23/2025)    [employer] - Occupational Stress Questionnaire     Feeling of Stress : Not at all   Recent Concern: Stress - Stress Concern Present (7/15/2025)    [employer] - Occupational Stress Questionnaire     Feeling of Stress : To some extent   Housing Stability: Low Risk  (7/23/2025)    Housing Stability Vital Sign     Unable to Pay for Housing in the Last Year: No     Number of Times Moved in the Last Year: 0     Homeless in the Last Year: No   [2]   Patient Active Problem List  Diagnosis    Type 2 diabetes mellitus with stage 4 chronic kidney disease, with long-term current use of insulin    Diabetic polyneuropathy associated with type 2 diabetes mellitus    Hyperlipidemia associated with type 2 diabetes mellitus    Depression - situational    Anxiety    Prophylactic immunotherapy    Chronic combined systolic (congestive) and diastolic (congestive) heart failure    S/P cadaveric kidney transplant 11/5/2016. ESRD secondary to HTN/DMII    Adverse effect of glucocorticoid or synthetic analogue    Adverse effect of immunosuppressive drug    Immunocompromised state due to drug therapy    CKD IV (severe)    Type 2 diabetes mellitus     Hypercoagulable state due to paroxysmal atrial fibrillation    Anticoagulant long-term use    Peyronie's disease    Erectile dysfunction due to arterial insufficiency    BPH with urinary obstruction    History of colon polyps    COVID-19 virus infection    PAD (peripheral artery disease)    Claudication of both lower extremities    Immunosuppressed status    Thrombocytopenia, unspecified    H. pylori infection    Symptomatic anemia    Status post -donor kidney transplantation    Shortness of breath    Anemia of chronic disease    Long term (current) use of anticoagulants    Ankle edema, bilateral    Embolic stroke involving right middle cerebral artery    History of CVA (cerebrovascular accident)    Syncope    Bradycardia    Atrial flutter    Hypokalemia    Metabolic acidosis    Thrombocytopenia    Pneumonia of left lower lobe due to infectious organism    Wide-complex tachycardia    Left upper lobe consolidation    Hemoptysis    Pleural effusion    Insulin pump in place    Opportunistic infection    Elevated brain natriuretic peptide (BNP) level    Acute on chronic diastolic congestive heart failure    Acute kidney injury superimposed on stage 4 chronic kidney disease    Long-term use of immunosuppressant medication    Paroxysmal atrial fibrillation    Hypertension associated with stage 4 chronic kidney disease due to type 2 diabetes mellitus    Community acquired bacterial pneumonia    Hypertensive emergency    Pneumonia    Left renal mass    PVD (peripheral vascular disease)    Febrile illness

## 2025-07-30 LAB
T GONDII IGG SER QL IA: REACTIVE
T GONDII IGG SERPL IA-ACNC: 10.9 IU/ML

## 2025-07-30 NOTE — DISCHARGE SUMMARY
[facility] - Internal Medicine [facility] Medicine  Discharge Summary      Patient Name: Ravi Zamorano  MRN: [id number]  MARISOL: [id number]  Patient Class: OP- Observation  Admission Date: 7/22/2025  Hospital Length of Stay: 0 days  Discharge Date and Time: 7/23/2025  5:56 PM  Attending Physician: No att. providers found   Discharging Provider: Hiram Mcgrath MD  Primary Care Provider: Mima Mack MD  [facility] Medicine Team: [facility]OS[facility] MED A Hiram Mcgrath MD  Primary Care Team: [facility] HOS[facility] MED A    HPI:   68 yo with history of kidney transplant in 2016 on mycophenolate, tacrolimus and prednisone and DM II on insulin drip came in for fever of 101.2 that started this afternoon. He was recently discharged 4 days ago after being admitted for fever, generalized weakness and dizziness. He was treated with a course of antibiotics for aspiration pneumonia. Cellcept has been held since. He has associated weakness and lightheadedness. Denies cough, shortness breath, chest pain, abdominal pain, dysuria, frequency, vomiting or diarrhea. Normal WBC. Lactate and procal is normal. CXR is read as negative for acute process. UA is negative for infection. He was given zosyn and vanc in the ER. Patient mentions he has been admitted for fevers about five times since the beginning of the year.    * No surgery found *      Hospital Course:   68 yo with history of kidney transplant in 2016 on mycophenolate, tacrolimus and prednisone and DM II on insulin drip came in for fever of 101.2 that started this afternoon. He was recently discharged 4 days ago after being admitted for fever, generalized weakness and dizziness. He was treated with a course of antibiotics for aspiration pneumonia. Cellcept has been held since. He has associated weakness and lightheadedness. Denies cough, shortness breath, chest pain, abdominal pain, dysuria, frequency, vomiting or diarrhea. Normal WBC. Lactate and procal is normal. CXR is read as  "negative for acute process. UA is negative for infection. He was given zosyn and vanc in the ER. Patient mentions he has been admitted for fevers about five times since the beginning of the year.     ID was consulted, will hold off an abx for now. F/U with urology ASAP, as this certainly may be tumour fever.     Goals of Care Treatment Preferences:  Code Status: Full Code         Consults:   Consults (From admission, onward)          Status Ordering Provider     Inpatient consult to Infectious Diseases  Once        Provider:  (Not yet assigned)    Completed YOANA BOSWELL     Inpatient consult to Endocrinology  Once        Provider:  (Not yet assigned)    Completed YOANA BOSWELL     Inpatient consult to Kidney/Pancreas Transplant Medicine  Once        Provider:  (Not yet assigned)    Completed YOANA BOSWELL            Assessment & Plan  Febrile illness  Initial work up non-revealing  Blood cultures pending  Given vanc and zosyn in there ER  ID consult  Hold cellcept (has been held since last admission)  Type 2 diabetes mellitus with stage 4 chronic kidney disease, with long-term current use of insulin  Creatine stable for now. BMP reviewed- noted CrCl cannot be calculated (Patient's most recent lab result is older than the maximum 7 days allowed.). according to latest data. Based on current GFR, CKD stage is stage 4 - GFR 15-29.  Monitor UOP and serial BMP and adjust therapy as needed. Renally dose meds. Avoid nephrotoxic medications and procedures.    Lab Results   Component Value Date    HGBA1C 6.5 (H) 07/13/2025     Resume insulin drip  Endocrinology consulted  Chronic combined systolic (congestive) and diastolic (congestive) heart failure  Patient has Systolic (HFrEF) heart failure that is Chronic. On presentation their CHF was well compensated. Most recent BNP and echo results are listed below.  No results for input(s): "BNP" in the last 72 hours.    Latest ECHO  Results for orders placed during the hospital " encounter of 07/12/25    Echo    Interpretation Summary    Left Ventricle: The left ventricle is normal in size. Mildly increased wall thickness. There is concentric remodeling. Normal wall motion. There is low normal systolic function with a visually estimated ejection fraction of 50 - 55%. Grade III diastolic dysfunction. Elevated left ventricular filling pressure.    Right Ventricle: The right ventricle is normal in size measuring 3.5 cm. Wall thickness is normal. Systolic function is reduced.    Left Atrium: There is a small atrial septal defect in the mid/anterior septum that appears iatrogenic.  There is left to right shunting by color Doppler interrogation.  Velocities suggest that the left atrial pressure is markedly elevated.    Aortic Valve: There is mild aortic regurgitation.    Pulmonary Artery: There is moderate to severe pulmonary hypertension. The estimated pulmonary artery systolic pressure is 67 mmHg.    IVC/SVC: Normal venous pressure at 3 mmHg.    Current Heart Failure Medications  furosemide tablet 60 mg, 2 times daily, Oral  Bidil 10 mg/25 mg ii po TID  Valsartan 160 mg BID   Metoprolol 25 mg BID  Jardiance 10 mg daily  Patient is no longer on entresto per cardiology    Plan  - Monitor strict I&Os and daily weights.    - Place on telemetry  - Low sodium diet  - Place on fluid restriction of 1.5 L.   - Cardiology has not been consulted  - The patient's volume status is at their baseline  S/P cadaveric kidney transplant 11/5/2016. ESRD secondary to HTN/DMII  Prophylactic immunotherapy  Taacrolimus - adjust per KTM  Prednisone 5 mg daily  Cellcept on hold  Paroxysmal atrial fibrillation  Patient has paroxysmal (<7 days) atrial fibrillation. Patient is currently in atrial fibrillation. [id number] Score: 3. The patients heart rate in the last 24 hours is as follows:  Pulse  Min: 53  Max: 53     Antiarrhythmics       Anticoagulants       Plan  - Replete lytes with a goal of K>4, Mg >2  - Patient is  anticoagulated, see medications listed above.  - Patient's afib is currently controlled  Hypertension associated with stage 4 chronic kidney disease due to type 2 diabetes mellitus  Creatine stable for now. BMP reviewed- noted CrCl cannot be calculated (Patient's most recent lab result is older than the maximum 7 days allowed.). according to latest data. Based on current GFR, CKD stage is stage 4 - GFR 15-29.  Monitor UOP and serial BMP and adjust therapy as needed. Renally dose meds. Avoid nephrotoxic medications and procedures.     Resume doxazosin 16 mg daily, bidil 2 tabs TID, furosemide 60 mg BID, valsartan 160 mg BID, metoprolol 25 mg BID  BPH with urinary obstruction  Doxazosin 16 mg daily  PVD (peripheral vascular disease)  History of CVA (cerebrovascular accident)  ASA 81 mg daily and atorvastatin 80 mg daily  Left renal mass  Will need to evaluate as outpatient with urology. Need to reschedule is appointment at discharge.  Insulin pump in place      Final Active Diagnoses:    Diagnosis Date Noted POA    PRINCIPAL PROBLEM:  Febrile illness [R50.9] 07/22/2025 Yes    PVD (peripheral vascular disease) [I73.9] 07/15/2025 Yes    Left renal mass [N28.89] 07/14/2025 Yes    Hypertension associated with stage 4 chronic kidney disease due to type 2 diabetes mellitus [E11.22, I12.9, N18.4] 07/13/2025 Yes    Paroxysmal atrial fibrillation [I48.0] 06/12/2025 Yes    Insulin pump in place [Z96.41] 05/06/2025 Not Applicable    History of CVA (cerebrovascular accident) [Z86.73] 01/30/2025 Not Applicable    BPH with urinary obstruction [N40.1, N13.8] 11/25/2020 Yes    S/P cadaveric kidney transplant 11/5/2016. ESRD secondary to HTN/DMII [Z94.0] 11/05/2016 Not Applicable    Chronic combined systolic (congestive) and diastolic (congestive) heart failure [I50.42] 10/24/2014 Yes    Prophylactic immunotherapy [Z29.89] 09/16/2014 Not Applicable    Type 2 diabetes mellitus with stage 4 chronic kidney disease, with long-term current  "use of insulin [E11.22, N18.4, Z79.4] 07/29/2014 Not Applicable      Problems Resolved During this Admission:       Discharged Condition: good    Disposition: Home or Self Care    Follow Up:    Patient Instructions:   No discharge procedures on file.    Significant Diagnostic Studies: Labs: BMP: No results for input(s): "GLU", "NA", "K", "CL", "CO2", "BUN", "CREATININE", "CALCIUM", "MG" in the last 48 hours.    Pending Diagnostic Studies:       None           Medications:  Reconciled Home Medications:      Medication List        CONTINUE taking these medications      acetaminophen 500 MG tablet  Commonly known as: TYLENOL  Take 500 mg by mouth every 6 (six) hours as needed for Pain.     aspirin 81 mg Tab  Take 81 mg by mouth once daily.     atorvastatin 80 MG tablet  Commonly known as: LIPITOR  Take 1 tablet (80 mg total) by mouth once daily.     BD ULTRA-FINE LO PEN NEEDLE 32 gauge x 5/32" Ndle  Generic drug: pen needle, diabetic  USE TO ADMINISTER INSULIN 4 TIMES DAILY.     DEXCOM G7 SENSOR Kayla  Generic drug: blood-glucose sensor  Change sensor every 10 days.     doxazosin 8 MG Tab  Commonly known as: CARDURA  Take 1 tablet (8 mg total) by mouth once daily.     famotidine 20 MG tablet  Commonly known as: PEPCID  Take 1 tablet by mouth every evening.     FeroSuL 325 mg (65 mg iron) Tab tablet  Generic drug: ferrous sulfate  Take 1 tablet (325 mg total) by mouth daily with breakfast.     gabapentin 300 MG capsule  Commonly known as: NEURONTIN  Take 1 capsule (300 mg total) by mouth 2 (two) times daily.     isosorbide-hydrALAZINE 20-37.5 mg 20-37.5 mg Tab  Commonly known as: BIDIL  Take 2 tablets by mouth 3 (three) times daily.     JARDIANCE 10 mg tablet  Generic drug: empagliflozin  Take 1 tablet (10 mg total) by mouth once daily.     magnesium oxide 400 mg (241.3 mg magnesium) tablet  Commonly known as: MAG-OX  Take 1 tablet (400 mg total) by mouth 2 (two) times daily.     meclizine 25 mg tablet  Commonly known " as: ANTIVERT  Take 1 tablet (25 mg total) by mouth 3 (three) times daily as needed for Dizziness.     melatonin 3 mg Cap  Take 2 capsules by mouth nightly as needed (Insomnia).     metoprolol tartrate 25 MG tablet  Commonly known as: LOPRESSOR  Take 1 tablet (25 mg total) by mouth 2 (two) times daily.     MIRALAX 17 gram Pwpk  Generic drug: polyethylene glycol  Take 17 g by mouth daily as needed for Constipation. Take 17 gm (mixed in liquid) by mouth every day.     mycophenolate 250 mg Cap  Commonly known as: CELLCEPT  Take 500 mg by mouth 2 (two) times daily.     * NovoLOG Flexpen U-100 Insulin 100 unit/mL (3 mL) Inpn pen  Generic drug: insulin aspart U-100  Use pump. Max daily 100 units     * NovoLOG U-100 Insulin aspart 100 unit/mL injection  Generic drug: insulin aspart U-100  Use in pump, max daily 100 units.     OMNIPOD 5 G6-G7 PODS (GEN 5) Crtg  Generic drug: insulin pump cart,auto,BT,G6/7  Change every 48 hours.     potassium chloride SA 20 MEQ tablet  Commonly known as: K-DUR,KLOR-CON  Take 20 mEq by mouth.     predniSONE 5 MG tablet  Commonly known as: DELTASONE  Take 1 tablet (5 mg total) by mouth once daily.     tacrolimus 1 MG Cap  Commonly known as: PROGRAF  Take 1 capsule (1 mg total) by mouth every 12 (twelve) hours.     torsemide 20 MG Tab  Commonly known as: DEMADEX  Take 2 tablets (40 mg total) by mouth once daily.     TRUE METRIX GLUCOSE TEST STRIP Strp  Generic drug: blood sugar diagnostic  Use to check blood glucose 1 times daily, to use with insurance preferred meter     TRUEPLUS LANCETS 30 gauge Misc  Generic drug: lancets  Use to check blood glucose 1 times daily, to use with insurance preferred meter     VITAMIN D2 1,250 mcg (50,000 unit) capsule  Generic drug: ergocalciferol  Take 1 capsule (50,000 Units total) by mouth every 7 days.     XARELTO 15 mg Tab  Generic drug: rivaroxaban  Take 1 tablet (15 mg total) by mouth daily with dinner or evening meal.           * This list has 2  medication(s) that are the same as other medications prescribed for you. Read the directions carefully, and ask your doctor or other care provider to review them with you.                  Indwelling Lines/Drains at time of discharge:   Lines/Drains/Airways       Line  Duration                  Subcutaneous Infusion Pump 07/15/25 Abdomen (Comment) 14 days                        Time spent on the discharge of patient: 15 minutes         Hiram Mcgrath MD  Department of Hospital Medicine  [facility] - Internal Medicine Telemetry

## 2025-07-30 NOTE — ASSESSMENT & PLAN NOTE
Creatine stable for now. BMP reviewed- noted CrCl cannot be calculated (Patient's most recent lab result is older than the maximum 7 days allowed.). according to latest data. Based on current GFR, CKD stage is stage 4 - GFR 15-29.  Monitor UOP and serial BMP and adjust therapy as needed. Renally dose meds. Avoid nephrotoxic medications and procedures.     Resume doxazosin 16 mg daily, bidil 2 tabs TID, furosemide 60 mg BID, valsartan 160 mg BID, metoprolol 25 mg BID

## 2025-07-30 NOTE — ASSESSMENT & PLAN NOTE
Creatine stable for now. BMP reviewed- noted CrCl cannot be calculated (Patient's most recent lab result is older than the maximum 7 days allowed.). according to latest data. Based on current GFR, CKD stage is stage 4 - GFR 15-29.  Monitor UOP and serial BMP and adjust therapy as needed. Renally dose meds. Avoid nephrotoxic medications and procedures.    Lab Results   Component Value Date    HGBA1C 6.5 (H) 07/13/2025     Resume insulin drip  Endocrinology consulted

## 2025-07-30 NOTE — ASSESSMENT & PLAN NOTE
"Patient has Systolic (HFrEF) heart failure that is Chronic. On presentation their CHF was well compensated. Most recent BNP and echo results are listed below.  No results for input(s): "BNP" in the last 72 hours.    Latest ECHO  Results for orders placed during the hospital encounter of 07/12/25    Echo    Interpretation Summary    Left Ventricle: The left ventricle is normal in size. Mildly increased wall thickness. There is concentric remodeling. Normal wall motion. There is low normal systolic function with a visually estimated ejection fraction of 50 - 55%. Grade III diastolic dysfunction. Elevated left ventricular filling pressure.    Right Ventricle: The right ventricle is normal in size measuring 3.5 cm. Wall thickness is normal. Systolic function is reduced.    Left Atrium: There is a small atrial septal defect in the mid/anterior septum that appears iatrogenic.  There is left to right shunting by color Doppler interrogation.  Velocities suggest that the left atrial pressure is markedly elevated.    Aortic Valve: There is mild aortic regurgitation.    Pulmonary Artery: There is moderate to severe pulmonary hypertension. The estimated pulmonary artery systolic pressure is 67 mmHg.    IVC/SVC: Normal venous pressure at 3 mmHg.    Current Heart Failure Medications  furosemide tablet 60 mg, 2 times daily, Oral  Bidil 10 mg/25 mg ii po TID  Valsartan 160 mg BID   Metoprolol 25 mg BID  Jardiance 10 mg daily  Patient is no longer on entresto per cardiology    Plan  - Monitor strict I&Os and daily weights.    - Place on telemetry  - Low sodium diet  - Place on fluid restriction of 1.5 L.   - Cardiology has not been consulted  - The patient's volume status is at their baseline  "

## 2025-07-30 NOTE — ASSESSMENT & PLAN NOTE
Patient has paroxysmal (<7 days) atrial fibrillation. Patient is currently in atrial fibrillation. [id number] Score: 3. The patients heart rate in the last 24 hours is as follows:  Pulse  Min: 53  Max: 53     Antiarrhythmics       Anticoagulants       Plan  - Replete lytes with a goal of K>4, Mg >2  - Patient is anticoagulated, see medications listed above.  - Patient's afib is currently controlled

## 2025-07-30 NOTE — HOSPITAL COURSE
70 yo with history of kidney transplant in 2016 on mycophenolate, tacrolimus and prednisone and DM II on insulin drip came in for fever of 101.2 that started this afternoon. He was recently discharged 4 days ago after being admitted for fever, generalized weakness and dizziness. He was treated with a course of antibiotics for aspiration pneumonia. Cellcept has been held since. He has associated weakness and lightheadedness. Denies cough, shortness breath, chest pain, abdominal pain, dysuria, frequency, vomiting or diarrhea. Normal WBC. Lactate and procal is normal. CXR is read as negative for acute process. UA is negative for infection. He was given zosyn and vanc in the ER. Patient mentions he has been admitted for fevers about five times since the beginning of the year.     ID was consulted, will hold off an abx for now. F/U with urology ASAP, as this certainly may be tumour fever.

## 2025-07-31 ENCOUNTER — CLINICAL SUPPORT (OUTPATIENT)
Dept: NEPHROLOGY | Facility: CLINIC | Age: 70
End: 2025-07-31
Payer: MEDICARE

## 2025-07-31 ENCOUNTER — OFFICE VISIT (OUTPATIENT)
Dept: CARDIOLOGY | Facility: CLINIC | Age: 70
End: 2025-07-31
Payer: MEDICARE

## 2025-07-31 ENCOUNTER — LAB VISIT (OUTPATIENT)
Dept: LAB | Facility: HOSPITAL | Age: 70
End: 2025-07-31
Payer: MEDICARE

## 2025-07-31 ENCOUNTER — OFFICE VISIT (OUTPATIENT)
Dept: INFECTIOUS DISEASES | Facility: CLINIC | Age: 70
End: 2025-07-31
Payer: MEDICARE

## 2025-07-31 ENCOUNTER — OFFICE VISIT (OUTPATIENT)
Dept: NEPHROLOGY | Facility: CLINIC | Age: 70
End: 2025-07-31
Payer: MEDICARE

## 2025-07-31 VITALS
DIASTOLIC BLOOD PRESSURE: 74 MMHG | OXYGEN SATURATION: 97 % | HEIGHT: 73 IN | BODY MASS INDEX: 26.17 KG/M2 | HEART RATE: 54 BPM | SYSTOLIC BLOOD PRESSURE: 158 MMHG | WEIGHT: 197.44 LBS

## 2025-07-31 VITALS
SYSTOLIC BLOOD PRESSURE: 142 MMHG | SYSTOLIC BLOOD PRESSURE: 142 MMHG | BODY MASS INDEX: 25.89 KG/M2 | WEIGHT: 196.19 LBS | OXYGEN SATURATION: 95 % | HEART RATE: 55 BPM | DIASTOLIC BLOOD PRESSURE: 60 MMHG | DIASTOLIC BLOOD PRESSURE: 60 MMHG | BODY MASS INDEX: 25.89 KG/M2 | WEIGHT: 196.19 LBS

## 2025-07-31 DIAGNOSIS — E11.22 HYPERTENSION ASSOCIATED WITH STAGE 4 CHRONIC KIDNEY DISEASE DUE TO TYPE 2 DIABETES MELLITUS: ICD-10-CM

## 2025-07-31 DIAGNOSIS — D63.8 ANEMIA OF CHRONIC DISEASE: Primary | ICD-10-CM

## 2025-07-31 DIAGNOSIS — N18.4 HYPERTENSION ASSOCIATED WITH STAGE 4 CHRONIC KIDNEY DISEASE DUE TO TYPE 2 DIABETES MELLITUS: ICD-10-CM

## 2025-07-31 DIAGNOSIS — Z94.0 S/P KIDNEY TRANSPLANT: ICD-10-CM

## 2025-07-31 DIAGNOSIS — N28.89 LEFT RENAL MASS: ICD-10-CM

## 2025-07-31 DIAGNOSIS — N18.4 ANEMIA IN STAGE 4 CHRONIC KIDNEY DISEASE: ICD-10-CM

## 2025-07-31 DIAGNOSIS — I50.32 CHRONIC DIASTOLIC HEART FAILURE: Primary | ICD-10-CM

## 2025-07-31 DIAGNOSIS — R50.9 FEVER, UNSPECIFIED FEVER CAUSE: ICD-10-CM

## 2025-07-31 DIAGNOSIS — N17.9 ACUTE KIDNEY INJURY SUPERIMPOSED ON STAGE 4 CHRONIC KIDNEY DISEASE: Primary | ICD-10-CM

## 2025-07-31 DIAGNOSIS — R50.9 FUO (FEVER OF UNKNOWN ORIGIN): Primary | ICD-10-CM

## 2025-07-31 DIAGNOSIS — D63.8 ANEMIA OF CHRONIC DISEASE: ICD-10-CM

## 2025-07-31 DIAGNOSIS — I12.9 HYPERTENSION ASSOCIATED WITH STAGE 4 CHRONIC KIDNEY DISEASE DUE TO TYPE 2 DIABETES MELLITUS: ICD-10-CM

## 2025-07-31 DIAGNOSIS — I48.92 ATRIAL FLUTTER, UNSPECIFIED TYPE: ICD-10-CM

## 2025-07-31 DIAGNOSIS — R80.9 PROTEINURIA, UNSPECIFIED TYPE: ICD-10-CM

## 2025-07-31 DIAGNOSIS — R50.9 FUO (FEVER OF UNKNOWN ORIGIN): ICD-10-CM

## 2025-07-31 DIAGNOSIS — Z79.899 IMMUNOCOMPROMISED STATE DUE TO DRUG THERAPY: ICD-10-CM

## 2025-07-31 DIAGNOSIS — I50.42 CHRONIC COMBINED SYSTOLIC (CONGESTIVE) AND DIASTOLIC (CONGESTIVE) HEART FAILURE: ICD-10-CM

## 2025-07-31 DIAGNOSIS — D63.1 ANEMIA IN STAGE 4 CHRONIC KIDNEY DISEASE: ICD-10-CM

## 2025-07-31 DIAGNOSIS — N18.4 ACUTE KIDNEY INJURY SUPERIMPOSED ON STAGE 4 CHRONIC KIDNEY DISEASE: Primary | ICD-10-CM

## 2025-07-31 DIAGNOSIS — D84.821 IMMUNOCOMPROMISED STATE DUE TO DRUG THERAPY: ICD-10-CM

## 2025-07-31 DIAGNOSIS — R06.02 SOB (SHORTNESS OF BREATH): ICD-10-CM

## 2025-07-31 DIAGNOSIS — N18.4 CHRONIC KIDNEY DISEASE (CKD), STAGE IV (SEVERE): ICD-10-CM

## 2025-07-31 DIAGNOSIS — I48.0 PAROXYSMAL ATRIAL FIBRILLATION: ICD-10-CM

## 2025-07-31 LAB
ABSOLUTE EOSINOPHIL (OHS): 0.3 K/UL
ABSOLUTE MONOCYTE (OHS): 0.62 K/UL (ref 0.3–1)
ABSOLUTE NEUTROPHIL COUNT (OHS): 4.26 K/UL (ref 1.8–7.7)
ALBUMIN SERPL BCP-MCNC: 2.5 G/DL (ref 3.5–5.2)
ALP SERPL-CCNC: 57 UNIT/L (ref 40–150)
ALT SERPL W/O P-5'-P-CCNC: 8 UNIT/L (ref 0–55)
ANION GAP (OHS): 9 MMOL/L (ref 8–16)
AST SERPL-CCNC: 23 UNIT/L (ref 0–50)
BASOPHILS # BLD AUTO: 0.03 K/UL
BASOPHILS NFR BLD AUTO: 0.5 %
BILIRUB SERPL-MCNC: 0.3 MG/DL (ref 0.1–1)
BUN SERPL-MCNC: 58 MG/DL (ref 8–23)
CALCIUM SERPL-MCNC: 9.4 MG/DL (ref 8.7–10.5)
CHLORIDE SERPL-SCNC: 103 MMOL/L (ref 95–110)
CO2 SERPL-SCNC: 24 MMOL/L (ref 23–29)
CREAT SERPL-MCNC: 4 MG/DL (ref 0.5–1.4)
ERYTHROCYTE [DISTWIDTH] IN BLOOD BY AUTOMATED COUNT: 19.1 % (ref 11.5–14.5)
GFR SERPLBLD CREATININE-BSD FMLA CKD-EPI: 16 ML/MIN/1.73/M2
GLUCOSE SERPL-MCNC: 200 MG/DL (ref 70–110)
HCT VFR BLD AUTO: 28.6 % (ref 40–54)
HGB BLD-MCNC: 8.6 GM/DL (ref 14–18)
IMM GRANULOCYTES # BLD AUTO: 0.04 K/UL (ref 0–0.04)
IMM GRANULOCYTES NFR BLD AUTO: 0.6 % (ref 0–0.5)
IRON SATN MFR SERPL: 10 % (ref 20–50)
IRON SERPL-MCNC: 16 UG/DL (ref 45–160)
LYMPHOCYTES # BLD AUTO: 1.07 K/UL (ref 1–4.8)
MAGNESIUM SERPL-MCNC: 1.8 MG/DL (ref 1.6–2.6)
MCH RBC QN AUTO: 21.2 PG (ref 27–31)
MCHC RBC AUTO-ENTMCNC: 30.1 G/DL (ref 32–36)
MCV RBC AUTO: 70 FL (ref 82–98)
NT-PROBNP SERPL-MCNC: ABNORMAL PG/ML
NUCLEATED RBC (/100WBC) (OHS): 0 /100 WBC
PHOSPHATE SERPL-MCNC: 3.5 MG/DL (ref 2.7–4.5)
PLATELET # BLD AUTO: 228 K/UL (ref 150–450)
PMV BLD AUTO: ABNORMAL FL
POTASSIUM SERPL-SCNC: 4.1 MMOL/L (ref 3.5–5.1)
PROT SERPL-MCNC: 6.6 GM/DL (ref 6–8.4)
RBC # BLD AUTO: 4.06 M/UL (ref 4.6–6.2)
RELATIVE EOSINOPHIL (OHS): 4.7 %
RELATIVE LYMPHOCYTE (OHS): 16.9 % (ref 18–48)
RELATIVE MONOCYTE (OHS): 9.8 % (ref 4–15)
RELATIVE NEUTROPHIL (OHS): 67.5 % (ref 38–73)
SODIUM SERPL-SCNC: 136 MMOL/L (ref 136–145)
TIBC SERPL-MCNC: 166 UG/DL (ref 250–450)
TRANSFERRIN SERPL-MCNC: 112 MG/DL (ref 200–375)
WBC # BLD AUTO: 6.32 K/UL (ref 3.9–12.7)

## 2025-07-31 PROCEDURE — 3044F HG A1C LEVEL LT 7.0%: CPT | Mod: CPTII,HCNC,S$GLB,

## 2025-07-31 PROCEDURE — 3288F FALL RISK ASSESSMENT DOCD: CPT | Mod: CPTII,HCNC,S$GLB,

## 2025-07-31 PROCEDURE — 3078F DIAST BP <80 MM HG: CPT | Mod: CPTII,HCNC,S$GLB,

## 2025-07-31 PROCEDURE — 3066F NEPHROPATHY DOC TX: CPT | Mod: CPTII,HCNC,S$GLB,

## 2025-07-31 PROCEDURE — 83540 ASSAY OF IRON: CPT | Mod: HCNC

## 2025-07-31 PROCEDURE — 36415 COLL VENOUS BLD VENIPUNCTURE: CPT | Mod: HCNC

## 2025-07-31 PROCEDURE — 99999 PR PBB SHADOW E&M-EST. PATIENT-LVL V: CPT | Mod: PBBFAC,HCNC,,

## 2025-07-31 PROCEDURE — 1159F MED LIST DOCD IN RCRD: CPT | Mod: CPTII,HCNC,S$GLB,

## 2025-07-31 PROCEDURE — 87799 DETECT AGENT NOS DNA QUANT: CPT | Mod: HCNC

## 2025-07-31 PROCEDURE — 1111F DSCHRG MED/CURRENT MED MERGE: CPT | Mod: CPTII,HCNC,S$GLB,

## 2025-07-31 PROCEDURE — 84450 TRANSFERASE (AST) (SGOT): CPT | Mod: HCNC

## 2025-07-31 PROCEDURE — 1101F PT FALLS ASSESS-DOCD LE1/YR: CPT | Mod: CPTII,HCNC,S$GLB,

## 2025-07-31 PROCEDURE — 1126F AMNT PAIN NOTED NONE PRSNT: CPT | Mod: CPTII,HCNC,S$GLB,

## 2025-07-31 PROCEDURE — 3077F SYST BP >= 140 MM HG: CPT | Mod: CPTII,HCNC,S$GLB,

## 2025-07-31 PROCEDURE — 83880 ASSAY OF NATRIURETIC PEPTIDE: CPT | Mod: HCNC

## 2025-07-31 PROCEDURE — 83735 ASSAY OF MAGNESIUM: CPT | Mod: HCNC

## 2025-07-31 PROCEDURE — 4010F ACE/ARB THERAPY RXD/TAKEN: CPT | Mod: CPTII,HCNC,S$GLB,

## 2025-07-31 PROCEDURE — 85025 COMPLETE CBC W/AUTO DIFF WBC: CPT | Mod: HCNC

## 2025-07-31 PROCEDURE — 99214 OFFICE O/P EST MOD 30 MIN: CPT | Mod: HCNC,S$GLB,,

## 2025-07-31 PROCEDURE — 3008F BODY MASS INDEX DOCD: CPT | Mod: CPTII,HCNC,S$GLB,

## 2025-07-31 PROCEDURE — 99999 PR PBB SHADOW E&M-EST. PATIENT-LVL II: CPT | Mod: PBBFAC,HCNC,,

## 2025-07-31 PROCEDURE — 99999 PR PBB SHADOW E&M-EST. PATIENT-LVL IV: CPT | Mod: PBBFAC,HCNC,GC,

## 2025-07-31 PROCEDURE — 1160F RVW MEDS BY RX/DR IN RCRD: CPT | Mod: CPTII,HCNC,S$GLB,

## 2025-07-31 PROCEDURE — 84100 ASSAY OF PHOSPHORUS: CPT | Mod: HCNC

## 2025-07-31 RX ORDER — EPINEPHRINE 0.3 MG/.3ML
0.3 INJECTION SUBCUTANEOUS ONCE AS NEEDED
OUTPATIENT
Start: 2025-07-31

## 2025-07-31 RX ORDER — HEPARIN 100 UNIT/ML
500 SYRINGE INTRAVENOUS
OUTPATIENT
Start: 2025-07-31

## 2025-07-31 RX ORDER — SODIUM CHLORIDE 0.9 % (FLUSH) 0.9 %
10 SYRINGE (ML) INJECTION
OUTPATIENT
Start: 2025-07-31

## 2025-07-31 NOTE — PROGRESS NOTES
Audio Only Telehealth Visit     The patient location is: [place]  The chief complaint leading to consultation is: FUO  Visit type: Virtual visit with audio only (telephone)  Total time spent in medical discussion with patient: 10 mins minutes  Total time spent on date of the encounter:20 minutes       The reason for the audio only service rather than synchronous audio and video virtual visit was related to technical difficulties or patient preference/necessity.       Each patient to whom I provide medical services by telemedicine is:  (1) informed of the relationship between the physician and patient and the respective role of any other health care provider with respect to management of the patient; and (2) notified that they may decline to receive medical services by telemedicine and may withdraw from such care at any time. Patient verbally consented to receive this service via voice-only telephone call.       HPI:   69M with history of kidney transplant 2016 on MMF, tacro, pred, T2DM, presents for follow-up for fevers of unknown origin.     Summary of illness:  Fevers started 3/2025. He has been admitted multiple times for fevers since March. He was admitted in early July 2025, completed a course of amox-clav and doxycycline for CAP, develop fever while on antibiotics. RIP returned negative. CT CAP 7/13/2025 with multifocal GGO and nodular airspace opacities, more confluent in RUL. Also noted to have ill-defined lesion of left renal upper pole, concerning for RCC. Denied having any pets at home. No travel in last 10 years. Used to be a . No international travel. He was started on pip-tazo and vancomycin in ED. Patient denied having any other associated symptoms, no cough, SOB, CP, nvd, abdominal pain, weight loss.      Subjective:  During hospitalization, fungal biomarkers sent - all returned negative. Karius negative. Toxo IgG positive, but IgM and DNA negative.    Patient reports ongoing  intermittent fevers, resolves with tylenol. Patient saw urologist - plans for MR abdomen to further assess plan.    Assessment and plan:    69M with history of kidney transplant 2016 on MMF, tacro, pred, T2DM, presents for follow-up for fevers of unknown origin. Extensive infectious work-up including fungal eval and karius testing negative. Main abnormality identified is ill-defined lesion of left renal upper pole with indeterminant attenuation - getting MR abdomen for further evaluation.    Recommendations:  - Follow-up MR abdomen and plan for renal mass.    20 minutes of total time spent on the encounter, which includes face to face time and non-face to face time preparing to see the patient (eg, review of tests), obtaining and reviewing separately obtained history, documenting clinical information in the electronic or other health record, independently interpreting results (not separately reported) and communicating results to the patient/family/caregiver, and care coordination (not separately reported).     This patient's visit complexity is inherent to evaluation and management associated with medical care services that are part of ongoing care related to this patient's single, serious condition or complex condition.     Mckenna Swartz MD MPH  Infectious Diseases - [facility]                 This service was not originating from a related E/M service provided within the previous 7 days nor will  to an E/M service or procedure within the next 24 hours or my soonest available appointment.  Prevailing standard of care was able to be met in this audio-only visit.

## 2025-07-31 NOTE — PROGRESS NOTES
Subjective     Chief Complaint: PHUONG    History of Present Illness:  Mr. Ravi Zamorano is a 69 y.o. male with known ESRD 2/2 DM & HTN and who was previously on HD and then obtained a DBD kidney transplant (), post-kidney transplant CKD 3b, Hepatitis C (treated ), afib s/p cardioversion (), chronic diastolic heart failure, diabetic neuropathy & retinopathy, BPH, erectile dysfunction, and anxiety/depression.       Interval History:  On tac , prednisone 5 mg qd, 500 mg bid MMF at home which was held 2/2 infection concerns. Patient presented with fever weakness and lightheadedness 4 days after hospital discharge  with similar symptoms. Baseline Cr 2-3, 3.8 on presentation. UA with 1+ protein. Seen by K[facility] inpatient who recommended bronchoscopy given concerns for recurrent aspiration. Etiology of fever infectious vs malignant. CTAP showing heterogeneous, ill-defined lesion in the left upper pole, new from prior, measuring approximately 3.2 cm. Urology planning MRI.        Kidney transplant ()  Donor Type:   - Brain Death  Donor CMV Status:  Positive  Donor HBcAB:  Negative  Donor HCV Status:  Positive  Transplant nephrologist: Dr. Sosa   Transplant medications: Tacrolimus 1 mg daily daily, Prednisone 5mg daily, and Mycophenolate 250mg 2 caps twice daily   Last Tacrolimus level was 7.6 on       Review of Systems   Constitutional:  Positive for fever and malaise/fatigue.   Respiratory:  Negative for shortness of breath.    Cardiovascular:  Negative for chest pain and leg swelling.   Gastrointestinal:  Negative for nausea and vomiting.   Genitourinary:  Negative for dysuria and hematuria.   Neurological:  Positive for weakness.       PAST HISTORY:     Past Medical History:   Diagnosis Date    Anxiety 2014    Arthritis     atrial fibrillation     History of cardioversion    Bilateral diabetic retinopathy 2017    Cardioembolic stroke 2024    almost completely resolved  - very mild left hand weakness    CKD (chronic kidney disease)     in transplanted kidney    Colon polyps 2014    Depression - situational 07/29/2014    Diabetes type 2 2000    since 2000.  c/b neuropathy, CKD    Encounter for blood transfusion     Febrile illness 07/22/2025    History of hepatitis C, s/p successful Rx w/ SVR24 - 9/2017 07/29/2014    Genotype 1a, treatment naive 10/2014 liver biopsy - grade 1 / stage 1 Completed Harvoni w/ SVR    Hyperlipidemia 07/29/2014    Hypertension     Hyponatremia 02/20/2025    Pancreatitis     S/P cadaveric kidney transplant 11/5/2016. ESRD secondary to HTN/DMII 11/05/2016    Stroke 2024       Past Surgical History:   Procedure Laterality Date    ABLATION OF ARRHYTHMOGENIC FOCUS FOR ATRIAL FIBRILLATION N/A 6/11/2024    Procedure: Ablation atrial fibrillation;  Surgeon: Bronson Bowden MD;  Location: [facility];  Service: Cardiology;  Laterality: N/A;  AF, FELICIANO (cx if SR), PVI, RFA, Carto, Gen, GP, 3 Prep    ABLATION OF ARRHYTHMOGENIC FOCUS FOR ATRIAL FIBRILLATION N/A 6/12/2025    Procedure: Ablation atrial fibrillation;  Surgeon: Bronson Bowden MD;  Location: [facility];  Service: Cardiology;  Laterality: N/A;  AF, FELICIANO (h/o CVA), redo PVI, CTI, RFA, Carto, Gen, GP, 3 Prep *insulin pump*    ABLATION, ATRIAL FLUTTER, ATYPICAL  6/12/2025    Procedure: Ablation, Atrial Flutter, Atypical;  Surgeon: Bronson Bowden MD;  Location: [facility];  Service: Cardiology;;    ABLATION, ATRIAL FLUTTER, TYPICAL  6/11/2024    Procedure: Ablation, Atrial Flutter, Typical;  Surgeon: Bronson Bowden MD;  Location: [facility];  Service: Cardiology;;    ABLATION, ATRIAL FLUTTER, TYPICAL N/A 6/12/2025    Procedure: Ablation, Atrial Flutter, Typical;  Surgeon: Bronson Bowden MD;  Location: [facility];  Service: Cardiology;  Laterality: N/A;    APPENDECTOMY      BONE MARROW BIOPSY Left 6/26/2024    Procedure: Biopsy-bone marrow;  Surgeon: Bridger Zapata MD;  Location: [address]  [facility]);  Service: Oncology;  Laterality: Left;  6/24-pt knows to hold aspirin and eliquis as instructed by hem/onc, precall complete-Kpvt    CARDIOVERSION  6/11/2024    Procedure: Cardioversion;  Surgeon: Bronson Bowden MD;  Location: [facility];  Service: Cardiology;;    CATARACT EXTRACTION W/  INTRAOCULAR LENS IMPLANT Right 3/13/2024    Procedure: EXTRACTION, CATARACT, WITH IOL INSERTION;  Surgeon: Jennifer Palacio MD;  Location: [facility] OR;  Service: Ophthalmology;  Laterality: Right;    CATARACT EXTRACTION W/  INTRAOCULAR LENS IMPLANT Left 4/10/2024    Procedure: EXTRACTION, CATARACT, WITH IOL INSERTION;  Surgeon: Jennifer Palacio MD;  Location: [facility] OR;  Service: Ophthalmology;  Laterality: Left;    COLONOSCOPY N/A 12/21/2020    Procedure: COLONOSCOPY;  Surgeon: Tico Bell MD;  Location: [facility] ([address] [place] [address]);  Service: Endoscopy;  Laterality: N/A;  ok to hold eliquis per Dr. Valadez, see telephone encounter 11/13/2020-MS  covid test 12/18 Muse    ECHOCARDIOGRAM,TRANSESOPHAGEAL N/A 2/3/2025    Procedure: Transesophageal echo (FELICIANO) intra-procedure log documentation;  Surgeon: Grayson Lin III, MD;  Location: [facility];  Service: Cardiology;  Laterality: N/A;    KIDNEY TRANSPLANT      TRANSESOPHAGEAL ECHOCARDIOGRAPHY N/A 8/26/2019    Procedure: ECHOCARDIOGRAM, TRANSESOPHAGEAL;  Surgeon: [facility] Diagnostic Provider;  Location: [facility];  Service: Cardiology;  Laterality: N/A;    TRANSESOPHAGEAL ECHOCARDIOGRAPHY N/A 6/11/2024    Procedure: ECHOCARDIOGRAM, TRANSESOPHAGEAL;  Surgeon: Grayson Lin III, MD;  Location: [facility];  Service: Cardiology;  Laterality: N/A;    TRANSESOPHAGEAL ECHOCARDIOGRAPHY N/A 6/12/2025    Procedure: ECHOCARDIOGRAM, TRANSESOPHAGEAL;  Surgeon: Breezy Nguyen MD;  Location: [facility];  Service: Cardiology;  Laterality: N/A;    TREATMENT OF CARDIAC ARRHYTHMIA N/A 8/26/2019    Procedure: CARDIOVERSION;  Surgeon: Bronson Bowden MD;  Location: [facility];   Service: Cardiology;  Laterality: N/A;  AF, FELICIANO, DCCV, MAC, GP, 3 PREP    TREATMENT OF CARDIAC ARRHYTHMIA N/A 2/3/2025    Procedure: Cardioversion or Defibrillation;  Surgeon: Bronson Bowden MD;  Location: [facility];  Service: Cardiology;  Laterality: N/A;  AF, FELICIANO, DCCV, MAC, GP, 3 Prep       Family History   Problem Relation Name Age of Onset    Diabetes Mother      Hypertension Mother      Heart disease Mother          CAD with PCI    Heart disease Father      Cancer Brother 2         one sister with breast cancer    Hypertension Brother 2         one sister with HTN and borderline DM    Stroke Neg Hx      Kidney disease Neg Hx      Colon cancer Neg Hx      Esophageal cancer Neg Hx         Social History     Socioeconomic History    Marital status:    Occupational History     Employer: [employer]   Tobacco Use    Smoking status: Former     Current packs/day: 0.00     Average packs/day: 0.5 packs/day for 32.0 years (16.0 ttl pk-yrs)     Types: Cigarettes     Start date: 1984     Quit date: 2016     Years since quittin.6    Smokeless tobacco: Never   Vaping Use    Vaping status: Never Used   Substance and Sexual Activity    Alcohol use: Not Currently    Drug use: No    Sexual activity: Yes     Partners: Female   Social History Narrative     for 14 years     for 38 years    2 children ages 41, 42     Social Drivers of Health     Financial Resource Strain: Low Risk  (2025)    Overall Financial Resource Strain (CARDIA)     Difficulty of Paying Living Expenses: Not hard at all   Food Insecurity: No Food Insecurity (2025)    Hunger Vital Sign     Worried About Running Out of Food in the Last Year: Never true     Ran Out of Food in the Last Year: Never true   Transportation Needs: No Transportation Needs (2025)    PRAPARE - Transportation     Lack of Transportation (Medical): No     Lack of Transportation (Non-Medical): No   Physical Activity:  Insufficiently Active (7/23/2025)    Exercise Vital Sign     Days of Exercise per Week: 4 days     Minutes of Exercise per Session: 20 min   Stress: No Stress Concern Present (7/23/2025)    [employer] [place] [employer] - Occupational Stress Questionnaire     Feeling of Stress : Not at all   Recent Concern: Stress - Stress Concern Present (7/15/2025)    [employer] [place] [employer] - Occupational Stress Questionnaire     Feeling of Stress : To some extent   Housing Stability: Low Risk  (7/23/2025)    Housing Stability Vital Sign     Unable to Pay for Housing in the Last Year: No     Number of Times Moved in the Last Year: 0     Homeless in the Last Year: No       MEDICATIONS & ALLERGIES:     Current Outpatient Medications on File Prior to Visit   Medication Sig    acetaminophen (TYLENOL) 500 MG tablet Take 500 mg by mouth every 6 (six) hours as needed for Pain.    aspirin 81 mg Tab Take 81 mg by mouth once daily.    atorvastatin (LIPITOR) 80 MG tablet Take 1 tablet (80 mg total) by mouth once daily.    blood sugar diagnostic Strp Use to check blood glucose 1 times daily, to use with insurance preferred meter    blood-glucose sensor (DEXCOM G7 SENSOR) Kayla Change sensor every 10 days.    doxazosin (CARDURA) 8 MG Tab Take 1 tablet (8 mg total) by mouth once daily.    empagliflozin (JARDIANCE) 10 mg tablet Take 1 tablet (10 mg total) by mouth once daily.    ergocalciferol (VITAMIN D2) 50,000 unit Cap Take 1 capsule (50,000 Units total) by mouth every 7 days.    famotidine (PEPCID) 20 MG tablet Take 1 tablet by mouth every evening.    ferrous sulfate (FEOSOL) 325 mg (65 mg iron) Tab tablet Take 1 tablet (325 mg total) by mouth daily with breakfast.    gabapentin (NEURONTIN) 300 MG capsule Take 1 capsule (300 mg total) by mouth 2 (two) times daily.    insulin aspart U-100 (NOVOLOG FLEXPEN U-100 INSULIN) 100 unit/mL (3 mL) InPn pen Use pump. Max daily 100 units    insulin aspart U-100 (NOVOLOG U-100  "INSULIN ASPART) 100 unit/mL injection Use in pump, max daily 100 units.    insulin pump cart,auto,BT,G6/7 (OMNIPOD 5 G6-G7 PODS, GEN 5,) Crtg Change every 48 hours.    isosorbide-hydrALAZINE 20-37.5 mg (BIDIL) 20-37.5 mg Tab Take 2 tablets by mouth 3 (three) times daily.    lancets 30 gauge Misc Use to check blood glucose 1 times daily, to use with insurance preferred meter    magnesium oxide (MAG-OX) 400 mg (241.3 mg magnesium) tablet Take 1 tablet (400 mg total) by mouth 2 (two) times daily.    meclizine (ANTIVERT) 25 mg tablet Take 1 tablet (25 mg total) by mouth 3 (three) times daily as needed for Dizziness.    melatonin 3 mg Cap Take 2 capsules by mouth nightly as needed (Insomnia).    metoprolol tartrate (LOPRESSOR) 25 MG tablet Take 1 tablet (25 mg total) by mouth 2 (two) times daily.    mycophenolate (CELLCEPT) 250 mg Cap Take 500 mg by mouth 2 (two) times daily.    pen needle, diabetic (BD ULTRA-FINE LO PEN NEEDLE) 32 gauge x 5/32" Ndle USE TO ADMINISTER INSULIN 4 TIMES DAILY.    polyethylene glycol (MIRALAX) 17 gram PwPk Take 17 g by mouth daily as needed for Constipation. Take 17 gm (mixed in liquid) by mouth every day.    potassium chloride SA (K-DUR,KLOR-CON) 20 MEQ tablet Take 20 mEq by mouth.    predniSONE (DELTASONE) 5 MG tablet Take 1 tablet (5 mg total) by mouth once daily.    rivaroxaban (XARELTO) 15 mg Tab Take 1 tablet (15 mg total) by mouth daily with dinner or evening meal.    tacrolimus (PROGRAF) 1 MG Cap Take 1 capsule (1 mg total) by mouth every 12 (twelve) hours.    torsemide (DEMADEX) 20 MG Tab Take 2 tablets (40 mg total) by mouth once daily.    [DISCONTINUED] insulin lispro (HUMALOG KWIKPEN INSULIN) 100 unit/mL pen Inject 18 units w/ breakfast, 16 units w/ lunch and dinner plus scale 150-200 +2, 201-250 +4, 251-300 +6, 301-350 +8.     Current Facility-Administered Medications on File Prior to Visit   Medication    epoetin tanya injection 4,000 Units       Review of patient's allergies " indicates:   Allergen Reactions    Nifedipine Other (See Comments)     Gingival hyperplasia       OBJECTIVE:     Vital Signs:  Vitals:    07/31/25 1258   BP: (!) 142/60   Weight: 89 kg (196 lb 3.4 oz)       Body mass index is 25.89 kg/m².     Physical Exam  Constitutional:       General: He is not in acute distress.     Appearance: He is not ill-appearing.   HENT:      Mouth/Throat:      Mouth: Mucous membranes are dry.   Cardiovascular:      Rate and Rhythm: Normal rate.      Pulses: Normal pulses.      Heart sounds: Normal heart sounds.   Pulmonary:      Effort: Pulmonary effort is normal.      Breath sounds: Normal breath sounds.   Abdominal:      General: Abdomen is flat. There is no distension.      Palpations: Abdomen is soft.      Tenderness: There is no abdominal tenderness.   Musculoskeletal:      Right lower leg: No edema.      Left lower leg: No edema.   Skin:     General: Skin is warm and dry.   Neurological:      Mental Status: He is alert and oriented to person, place, and time. Mental status is at baseline.         Laboratory  Recent Results (from the past 24 hours)   Iron and TIBC    Collection Time: 07/31/25 12:40 PM   Result Value Ref Range    Iron Level 16 (L) 45 - 160 ug/dL    Transferrin 112 (L) 200 - 375 mg/dL    Iron Binding Capacity Total 166 (L) 250 - 450 ug/dL    Iron Saturation 10 (L) 20 - 50 %   Comprehensive Metabolic Panel    Collection Time: 07/31/25 12:40 PM   Result Value Ref Range    Sodium 136 136 - 145 mmol/L    Potassium 4.1 3.5 - 5.1 mmol/L    Chloride 103 95 - 110 mmol/L    CO2 24 23 - 29 mmol/L    Glucose 200 (H) 70 - 110 mg/dL    BUN 58 (H) 8 - 23 mg/dL    Creatinine 4.0 (H) 0.5 - 1.4 mg/dL    Calcium 9.4 8.7 - 10.5 mg/dL    Protein Total 6.6 6.0 - 8.4 gm/dL    Albumin 2.5 (L) 3.5 - 5.2 g/dL    Bilirubin Total 0.3 0.1 - 1.0 mg/dL    ALP 57 40 - 150 unit/L    AST 23 0 - 50 unit/L    ALT 8 0 - 55 unit/L    Anion Gap 9 8 - 16 mmol/L    eGFR 16 (L) >60 mL/min/1.73/m2   Magnesium     Collection Time: 07/31/25 12:40 PM   Result Value Ref Range    Magnesium  1.8 1.6 - 2.6 mg/dL   Phosphorus    Collection Time: 07/31/25 12:40 PM   Result Value Ref Range    Phosphorus Level 3.5 2.7 - 4.5 mg/dL   CBC with Differential    Collection Time: 07/31/25 12:40 PM   Result Value Ref Range    WBC 6.32 3.90 - 12.70 K/uL    RBC 4.06 (L) 4.60 - 6.20 M/uL    HGB 8.6 (L) 14.0 - 18.0 gm/dL    HCT 28.6 (L) 40.0 - 54.0 %    MCV 70 (L) 82 - 98 fL    MCH 21.2 (L) 27.0 - 31.0 pg    MCHC 30.1 (L) 32.0 - 36.0 g/dL    RDW 19.1 (H) 11.5 - 14.5 %    Platelet Count 228 150 - 450 K/uL    MPV      Nucleated RBC 0 <=0 /100 WBC    Neut % 67.5 38 - 73 %    Lymph % 16.9 (L) 18 - 48 %    Mono % 9.8 4 - 15 %    Eos % 4.7 <=8 %    Basophil % 0.5 <=1.9 %    Imm Grans % 0.6 (H) 0.0 - 0.5 %    Neut # 4.26 1.8 - 7.7 K/uL    Lymph # 1.07 1 - 4.8 K/uL    Mono # 0.62 0.3 - 1 K/uL    Eos # 0.30 <=0.5 K/uL    Baso # 0.03 <=0.2 K/uL    Imm Grans # 0.04 0.00 - 0.04 K/uL       Diagnostic Results:      Health Maintenance Due   Topic Date Due    COVID-19 Vaccine (8 - 2024-25 season) 11/14/2024    Foot Exam  07/03/2025    Diabetic Eye Exam  08/08/2025         ASSESSMENT & PLAN:   Mr. Ravi Zamorano is a 69 y.o. male S/P renal transplant in 2016 who presents to the office for follow up of CKD. Prior to 2024 stable creatinine at 1.7, however in June 2024 he had an acute rise in creatinine to 2.7 in the setting of volume overload and symptomatic anemia. Proteinuria gradually increasing. Over the past several months he had multiple admissions with fevers and concern for infection. Baseline appears to be worsening. Low iron levels, on EPO with hgb 8.6. Inconsistent BP readings, high in clinic low at home. Given worsening clinical status considering biopsy 2/2 concern for graft rejection.     S/P cadaveric kidney transplant 11/5/2016. ESRD secondary to HTN/DMII    CKD IV (severe)  -     Urinalysis; Future; Expected date: 07/31/2025  -     Protein /  creatinine ratio, urine; Future; Expected date: 07/31/2025    Hypertension associated with stage 4 chronic kidney disease due to type 2 diabetes mellitus    Left renal mass    Acute kidney injury superimposed on stage 4 chronic kidney disease    Chronic combined systolic (congestive) and diastolic (congestive) heart failure    Proteinuria, unspecified type    Fever, unspecified fever cause        Stage 4 chronic kidney disease  -Previous baseline ~1.7-2, now appears to be ~3  -Recurrent AKIs, Cr at last hospital discharge 3.8, 4.0 on discharge follow up  -May be at new baseline     S/P cadaveric kidney transplant 11/5/2016. ESRD secondary to HTN/DMII  -Recurrent AKIs, new baseline appears to be ~3  -Repeat UA, UPCR  -KTM recently discussed biopsy which was deferred, given worsening Cr if worsening proteinuria will consider biopsy 2/2 concern for graft rejection  -Continue tacrolimus and prednisone, holding MMF given fevers and concern for infection    Immunocompromised state due to drug therapy  -Continue current dose of tacrolimus and prednisone, holding MMF 2/2 fevers and concern for infection     Renal Mass  -L renal mass, concerning for malignancy, potential source of fevers  -Following with urology     Anemia of chronic disease  -Continue EPO to maint Hgb above 10.0  -Low iron, on PO iron  -Will plan for iron infusions weekly for 5x doses     Renovascular hypertension  -Inconsistent BP readings at home (80-90s/60) and in clinic (142/60)     Proteinuria, unspecified type  -Rechecking UPCR now, discussed starting GLP-1 RA now. Patient was previously on Ozempic and reports weight loss on Ozempic and is concerned about further weight loss if GLP-1 RA is resumed.     Fever  -May be infectious vs malignancy  -Further workup per PCP and ID    CHF  -on torsemide 40 daily, appears dry to euvolemic    RTC in 2 weeks    Discussed with Dr. Chiu  - staff attestation to follow      Aditya Kasper MD  Nephrology  PGY-4    [address] [place] [address]  [phone]

## 2025-07-31 NOTE — PATIENT INSTRUCTIONS
Continue torsemide 40mg once daily.    Okay to take torsemide 40mg twice daily as needed for worsening shortness of breath, swelling or 3lb weight gain on home scale.

## 2025-07-31 NOTE — PROGRESS NOTES
Hgb 8.6 / Hct 28.6     Retacrit 4000 units .Per Form # [id number]. Tolerated well, no reactions noted. No concerns . Pt was given 2 week appt.      GFR 16

## 2025-07-31 NOTE — PROGRESS NOTES
[facility] Provider Note (Follow-up) Consult Note      HPI:     Patient denies worsening SOB from recent admission, presents to clinic in wheelchair              Patient denies orthopnea symptoms. Sleeping on 1-2 number of pillows, but able to lay flat              Patient wakes up SOB, has to get out of bed, associated cough- denies              Palpitations - denies recent              Dizzy, light-headed, pre-syncope or syncope- reports more frequent episodes of dizziness/lightheadedness attributed to vertigo flare up, taking meclizine prn. Denies pre-syncope/syncope              Since discharge frequency of performing weights, home weight and weight change- performing home weights, previous home dry weight goal ~190lbs              Other information felt pertinent to HPI: Mr. Ravi Zamorano is a 67 yo male with a PMHx of AF, HFpEF, bicuspid AV, HTN, HLD, PAD, IDDM, ESRD s/p renal txp 2016, CKD 4 who presents to third [facility] enrollment following recent admissions for fever. Currently being worked up for L renal mass.        PHYSICAL:   Vitals:    07/31/25 1405   BP: (!) 158/74   Pulse: (!) 54        @[id number](3)@    JVD: no  Heart rhythm: irregular  Cardiac murmur: No    S3: no  S4: no  Lungs: clear  Hepatojugular reflux: no  Edema: no    Lab Results   Component Value Date     07/31/2025     (L) 03/19/2025    K 4.1 07/31/2025    K 3.6 03/19/2025    MG 1.8 07/31/2025     07/31/2025     03/19/2025    CO2 24 07/31/2025    CO2 19 (L) 03/19/2025    BUN 58 (H) 07/31/2025    CREATININE 4.0 (H) 07/31/2025     (H) 07/31/2025     (H) 03/19/2025    CALCIUM 9.4 07/31/2025    CALCIUM 8.0 (L) 03/19/2025    AST 23 07/31/2025    AST 11 03/19/2025    ALT 8 07/31/2025    ALT <5 (L) 03/19/2025    ALBUMIN 2.5 (L) 07/31/2025    ALBUMIN 2.5 (L) 03/19/2025    PROT 6.6 07/31/2025    PROT 5.6 (L) 03/19/2025    BILITOT 0.3 07/31/2025    BILITOT 0.4 03/19/2025     Lab Results   Component Value Date     BNP >35,000 (H) 07/31/2025    BNP 4,520 (H) 07/22/2025    BNP 4,888 (H) 07/12/2025       Echo 6/4/25:    Left Ventricle: The left ventricle is normal in size. Ventricular mass is normal. Normal wall thickness. Normal wall motion. There is normal systolic function with a visually estimated ejection fraction of 55 - 60%. Ejection fraction is approximately 60%.    Right Ventricle: Systolic function is borderline low.    Left Atrium: The left atrium is severely dilated    Right Atrium: The right atrium is mildly dilated .    Aortic Valve: There is mild annular calcification present. There is mild aortic regurgitation.    Tricuspid Valve: There is mild regurgitation.    Pulmonary Artery: The estimated pulmonary artery systolic pressure is 36 mmHg.    IVC/SVC: Elevated venous pressure at 15 mmHg.    ASSESSMENT/PLAN:    1. Chronic diastolic heart failure  -NYHA Class II symptoms. Appears well compensated on exam today. Continue torsemide 40mg daily. Okay to take torsemide 40mg BID as needed for worsening SOB, swelling or 3lb weight gain in 1 day/5lb weight gain in 1 week on home scale  -Recommend 2-3 gram sodium restriction and 1500cc- 2000cc fluid restriction.  -Requested patient to weigh themselves daily, and to notify us if their weight increases by more than 3 lbs in 1 day or 5 lbs in 3 days.  -Plan for weekly [facility] phone check ins with RTC prn     2. Atrial flutter, unspecified type   -s/p FELICIANO with re-do PVI/AFL RFA on 6/12 per EP   -Now back in AF, rate controlled. Continue xarelto    3. Paroxysmal atrial fibrillation   -s/p FELICIANO with re-do PVI/AFL RFA on 6/12 per EP   -Continue xarelto    4. S/P cadaveric kidney transplant 11/5/2016. ESRD secondary to HTN/DMII  -Worsening renal function over the last 6 months on chart review. Has close Nephrology follow up today    5. CKD IV (severe)  -Cr 4.0 today, comparable to trend during recent admission. Possibly new baseline per Nephrology     Paris Lujan PA-C

## 2025-08-01 ENCOUNTER — OFFICE VISIT (OUTPATIENT)
Dept: INTERNAL MEDICINE | Facility: CLINIC | Age: 70
End: 2025-08-01
Payer: MEDICARE

## 2025-08-01 ENCOUNTER — LAB VISIT (OUTPATIENT)
Dept: LAB | Facility: HOSPITAL | Age: 70
End: 2025-08-01
Payer: MEDICARE

## 2025-08-01 VITALS
HEART RATE: 64 BPM | DIASTOLIC BLOOD PRESSURE: 60 MMHG | HEIGHT: 73 IN | WEIGHT: 197.31 LBS | OXYGEN SATURATION: 94 % | BODY MASS INDEX: 26.15 KG/M2 | SYSTOLIC BLOOD PRESSURE: 118 MMHG

## 2025-08-01 DIAGNOSIS — Z79.4 TYPE 2 DIABETES MELLITUS WITH STAGE 4 CHRONIC KIDNEY DISEASE, WITH LONG-TERM CURRENT USE OF INSULIN: ICD-10-CM

## 2025-08-01 DIAGNOSIS — Z94.0 S/P KIDNEY TRANSPLANT: ICD-10-CM

## 2025-08-01 DIAGNOSIS — N18.4 CHRONIC KIDNEY DISEASE (CKD), STAGE IV (SEVERE): ICD-10-CM

## 2025-08-01 DIAGNOSIS — E11.22 TYPE 2 DIABETES MELLITUS WITH STAGE 4 CHRONIC KIDNEY DISEASE, WITH LONG-TERM CURRENT USE OF INSULIN: ICD-10-CM

## 2025-08-01 DIAGNOSIS — I50.42 CHRONIC COMBINED SYSTOLIC (CONGESTIVE) AND DIASTOLIC (CONGESTIVE) HEART FAILURE: ICD-10-CM

## 2025-08-01 DIAGNOSIS — I48.0 PAROXYSMAL ATRIAL FIBRILLATION: ICD-10-CM

## 2025-08-01 DIAGNOSIS — N18.4 TYPE 2 DIABETES MELLITUS WITH STAGE 4 CHRONIC KIDNEY DISEASE, WITH LONG-TERM CURRENT USE OF INSULIN: ICD-10-CM

## 2025-08-01 DIAGNOSIS — R50.9 FEVER, UNKNOWN ORIGIN: Primary | ICD-10-CM

## 2025-08-01 LAB
BACTERIA #/AREA URNS AUTO: NORMAL /HPF
BILIRUB UR QL STRIP.AUTO: NEGATIVE
CLARITY UR: CLEAR
COLOR UR AUTO: YELLOW
EPSTEIN-BARR VIRUS DNA, QUAL (OHS): ABNORMAL
EPSTEIN-BARR VIRUS LOG (IU/ML)(OHS): ABNORMAL
EPSTEIN-BARR VIRUS PCR, QUANT (OHS): <50
GLUCOSE UR QL STRIP: ABNORMAL
HGB UR QL STRIP: NEGATIVE
HYALINE CASTS UR QL AUTO: 1 /LPF (ref 0–1)
KETONES UR QL STRIP: NEGATIVE
LEUKOCYTE ESTERASE UR QL STRIP: NEGATIVE
MICROSCOPIC COMMENT: NORMAL
NITRITE UR QL STRIP: NEGATIVE
PH UR STRIP: 7 [PH]
PROT UR QL STRIP: ABNORMAL
RBC #/AREA URNS AUTO: 1 /HPF (ref 0–4)
SP GR UR STRIP: 1.01
UROBILINOGEN UR STRIP-ACNC: NEGATIVE EU/DL
WBC #/AREA URNS AUTO: <1 /HPF (ref 0–5)

## 2025-08-01 PROCEDURE — 99999 PR PBB SHADOW E&M-EST. PATIENT-LVL V: CPT | Mod: PBBFAC,HCNC,, | Performed by: INTERNAL MEDICINE

## 2025-08-01 PROCEDURE — 81001 URINALYSIS AUTO W/SCOPE: CPT | Mod: HCNC

## 2025-08-01 NOTE — PROGRESS NOTES
"Subjective:       Patient ID: Ravi Zamorano is a 69 y.o. male.    Chief Complaint: hopsital follow up    HPI  69 y.o. male presents for a hospital follow up visit. I have reviewed discharge summary as well as all relevant laboratory and pathology results and imaging.     Patient was admitted 7/22/25 to 7/23/25 with fever. Hospitalization prior for aspiration pneumonia. Cellcept on hold since last admission. Kidney transplant 11/5/2016.    Admitted 7/18-7/22 with acute on chronic chf, cap    Current Heart Failure Medications  furosemide tablet 60 mg, 2 times daily, Oral  Bidil 10 mg/25 mg ii po TID  Valsartan 160 mg BID   Metoprolol 25 mg BID  Jardiance 10 mg daily  Patient is no longer on entresto per cardiology    HTN  CKD4  DM2  doxazosin 16 mg daily, bidil 2 tabs TID, furosemide 60 mg BID, valsartan 160 mg BID, metoprolol 25 mg BID   Insulin pump  Labs yesterday: Cr 4.0; gfr 16  A1c 6.5%    PVD  Hx CVA  Asa 81  Atorva 80    Left renal mass - needs eval with outpt urology - possibly cause of fever???  MRI on 8/8 and appt w Dr. Diez on 8/12.     Last fever was 2 days ago. Wife reports it usually occurs in the morning. Was 101 two days ago, sometimes only up to 99.8.     His CRP has been elevated to 106.2  Review of Systems   Constitutional:  Positive for fatigue and fever.   Respiratory:  Negative for shortness of breath.    Cardiovascular:  Negative for chest pain.   Musculoskeletal: Negative.    Skin: Negative.    Neurological:  Positive for tremors and light-headedness.       Objective:   /60 (BP Location: Left arm, Patient Position: Sitting)   Pulse 64   Ht 6' 1" (1.854 m)   Wt 89.5 kg (197 lb 5 oz)   SpO2 (!) 94%   BMI 26.03 kg/m²      Physical Exam  Constitutional:       General: He is not in acute distress.     Appearance: He is well-developed. He is not diaphoretic.      Comments: Pt appears fatigued but in no acute distress, in wheelchair   HENT:      Head: Normocephalic and atraumatic. "   Cardiovascular:      Rate and Rhythm: Normal rate. Rhythm irregular.      Heart sounds: No murmur heard.  Pulmonary:      Effort: Pulmonary effort is normal. No respiratory distress.      Breath sounds: No wheezing or rales.   Skin:     General: Skin is warm and dry.   Neurological:      Mental Status: He is alert and oriented to person, place, and time.   Psychiatric:         Behavior: Behavior normal.       resting tremor bilaterally  Assessment:       1. Fever, unknown origin    2. Chronic combined systolic (congestive) and diastolic (congestive) heart failure    3. Paroxysmal atrial fibrillation    4. S/P cadaveric kidney transplant 11/5/2016. ESRD secondary to HTN/DMII    5. Type 2 diabetes mellitus with stage 4 chronic kidney disease, with long-term current use of insulin        Plan:       Fever, unknown origin  Extensive workup has been unrevealing  Possibly related to renal mass???  Encouraged him to schedule chest CT ordered by pulmonologist    Chronic combined systolic (congestive) and diastolic (congestive) heart failure  Appear euvolemic today    Paroxysmal atrial fibrillation  Rate controlled  Continue current meds    S/P cadaveric kidney transplant 11/5/2016. ESRD secondary to HTN/DMII  Holding cellcept, continue other meds    Type 2 diabetes mellitus with stage 4 chronic kidney disease, with long-term current use of insulin  - stable and controlled on insulin pump  - continue current regimen    Left renal mass  Mri and urology appt upcoming      Health Maintenance Due   Topic    Diabetic Eye Exam

## 2025-08-04 ENCOUNTER — TELEPHONE (OUTPATIENT)
Dept: INFECTIOUS DISEASES | Facility: HOSPITAL | Age: 70
End: 2025-08-04
Payer: MEDICARE

## 2025-08-04 NOTE — TELEPHONE ENCOUNTER
Spoke with pt in regards to scheduling his VENOFER(5) infusions. He states he would like a call back tomorrow due to his wife makes the appointments.

## 2025-08-05 ENCOUNTER — OFFICE VISIT (OUTPATIENT)
Dept: CARDIOLOGY | Facility: CLINIC | Age: 70
End: 2025-08-05
Payer: MEDICARE

## 2025-08-05 VITALS
DIASTOLIC BLOOD PRESSURE: 76 MMHG | BODY MASS INDEX: 26.15 KG/M2 | HEART RATE: 54 BPM | WEIGHT: 197.31 LBS | OXYGEN SATURATION: 97 % | SYSTOLIC BLOOD PRESSURE: 154 MMHG | HEIGHT: 73 IN

## 2025-08-05 DIAGNOSIS — Z79.4 TYPE 2 DIABETES MELLITUS WITH STAGE 3B CHRONIC KIDNEY DISEASE, WITH LONG-TERM CURRENT USE OF INSULIN: ICD-10-CM

## 2025-08-05 DIAGNOSIS — E11.22 TYPE 2 DIABETES MELLITUS WITH STAGE 3B CHRONIC KIDNEY DISEASE, WITH LONG-TERM CURRENT USE OF INSULIN: ICD-10-CM

## 2025-08-05 DIAGNOSIS — E78.2 MIXED HYPERLIPIDEMIA: ICD-10-CM

## 2025-08-05 DIAGNOSIS — N18.32 TYPE 2 DIABETES MELLITUS WITH STAGE 3B CHRONIC KIDNEY DISEASE, WITH LONG-TERM CURRENT USE OF INSULIN: ICD-10-CM

## 2025-08-05 DIAGNOSIS — I48.92 ATRIAL FLUTTER, UNSPECIFIED TYPE: ICD-10-CM

## 2025-08-05 DIAGNOSIS — I48.0 PAROXYSMAL ATRIAL FIBRILLATION: ICD-10-CM

## 2025-08-05 DIAGNOSIS — E11.22 HYPERTENSION ASSOCIATED WITH STAGE 4 CHRONIC KIDNEY DISEASE DUE TO TYPE 2 DIABETES MELLITUS: ICD-10-CM

## 2025-08-05 DIAGNOSIS — Z86.73 HISTORY OF CVA (CEREBROVASCULAR ACCIDENT): ICD-10-CM

## 2025-08-05 DIAGNOSIS — N18.4 HYPERTENSION ASSOCIATED WITH STAGE 4 CHRONIC KIDNEY DISEASE DUE TO TYPE 2 DIABETES MELLITUS: ICD-10-CM

## 2025-08-05 DIAGNOSIS — I50.42 CHRONIC COMBINED SYSTOLIC (CONGESTIVE) AND DIASTOLIC (CONGESTIVE) HEART FAILURE: Primary | ICD-10-CM

## 2025-08-05 DIAGNOSIS — I12.9 HYPERTENSION ASSOCIATED WITH STAGE 4 CHRONIC KIDNEY DISEASE DUE TO TYPE 2 DIABETES MELLITUS: ICD-10-CM

## 2025-08-05 DIAGNOSIS — I73.9 PAD (PERIPHERAL ARTERY DISEASE): ICD-10-CM

## 2025-08-05 PROCEDURE — 99215 OFFICE O/P EST HI 40 MIN: CPT | Mod: HCNC,GC,S$GLB, | Performed by: INTERNAL MEDICINE

## 2025-08-05 PROCEDURE — 3008F BODY MASS INDEX DOCD: CPT | Mod: CPTII,HCNC,GC,S$GLB | Performed by: INTERNAL MEDICINE

## 2025-08-05 PROCEDURE — 1126F AMNT PAIN NOTED NONE PRSNT: CPT | Mod: CPTII,HCNC,GC,S$GLB | Performed by: INTERNAL MEDICINE

## 2025-08-05 PROCEDURE — 1101F PT FALLS ASSESS-DOCD LE1/YR: CPT | Mod: CPTII,HCNC,GC,S$GLB | Performed by: INTERNAL MEDICINE

## 2025-08-05 PROCEDURE — 1159F MED LIST DOCD IN RCRD: CPT | Mod: CPTII,HCNC,GC,S$GLB | Performed by: INTERNAL MEDICINE

## 2025-08-05 PROCEDURE — 3077F SYST BP >= 140 MM HG: CPT | Mod: CPTII,HCNC,GC,S$GLB | Performed by: INTERNAL MEDICINE

## 2025-08-05 PROCEDURE — 4010F ACE/ARB THERAPY RXD/TAKEN: CPT | Mod: CPTII,HCNC,GC,S$GLB | Performed by: INTERNAL MEDICINE

## 2025-08-05 PROCEDURE — 1111F DSCHRG MED/CURRENT MED MERGE: CPT | Mod: CPTII,HCNC,GC,S$GLB | Performed by: INTERNAL MEDICINE

## 2025-08-05 PROCEDURE — 3288F FALL RISK ASSESSMENT DOCD: CPT | Mod: CPTII,HCNC,GC,S$GLB | Performed by: INTERNAL MEDICINE

## 2025-08-05 PROCEDURE — 99999 PR PBB SHADOW E&M-EST. PATIENT-LVL V: CPT | Mod: PBBFAC,HCNC,GC, | Performed by: STUDENT IN AN ORGANIZED HEALTH CARE EDUCATION/TRAINING PROGRAM

## 2025-08-05 PROCEDURE — 3078F DIAST BP <80 MM HG: CPT | Mod: CPTII,HCNC,GC,S$GLB | Performed by: INTERNAL MEDICINE

## 2025-08-05 PROCEDURE — 3044F HG A1C LEVEL LT 7.0%: CPT | Mod: CPTII,HCNC,GC,S$GLB | Performed by: INTERNAL MEDICINE

## 2025-08-05 PROCEDURE — 3066F NEPHROPATHY DOC TX: CPT | Mod: CPTII,HCNC,GC,S$GLB | Performed by: INTERNAL MEDICINE

## 2025-08-05 RX ORDER — CLONIDINE HYDROCHLORIDE 0.1 MG/1
0.2 TABLET ORAL NIGHTLY
Qty: 60 TABLET | Refills: 11 | Status: SHIPPED | OUTPATIENT
Start: 2025-08-05 | End: 2025-08-08

## 2025-08-05 NOTE — PROGRESS NOTES
PCP - Mima Mack MD  Referring Physician:     Subjective:   Patient ID:  Ravi Zamorano is a 69 y.o. male with prior kidney txp in 2016 on Cellcept, Prograf, and prednisone, CKD4 of transplanted kidney, HFrecEF (lowest seen was 40%), AF on Xarelto, prior CVA (cardioembolic), and T2DM (insulin dependent) who had 2 recent admissions for fever/PNA treatment. While in the hospital, he was given some IV lasix during both admissions.     His current meds are Lopressor 25 BID, Jardiance 10 qd, and Bidil 10-25 TID. He was previously on Entresto and then valsartan but no longer. He takes torsemide 40mg PO qd with PRN BID dosing for weight gain/symptoms.    He has been feeling pretty well, not needing extra torsemide since his discharge. His breathing is good. He is sleeping well without orthopnea or PND. No leg swelling. No chest pain or palpitations.     It is reported he has an ICD in place. This is erroneous. I personally reviewed his most recent chest XR from 25 and he does not have a pacemaker or ICD or any intra-cardiac device in place.    History:     Social History     Tobacco Use    Smoking status: Former     Current packs/day: 0.00     Average packs/day: 0.5 packs/day for 32.0 years (16.0 ttl pk-yrs)     Types: Cigarettes     Start date: 1984     Quit date: 2016     Years since quittin.6    Smokeless tobacco: Never   Substance Use Topics    Alcohol use: Not Currently     Family History   Problem Relation Name Age of Onset    Diabetes Mother      Hypertension Mother      Heart disease Mother          CAD with PCI    Heart disease Father      Cancer Brother 2         one sister with breast cancer    Hypertension Brother 2         one sister with HTN and borderline DM    Stroke Neg Hx      Kidney disease Neg Hx      Colon cancer Neg Hx      Esophageal cancer Neg Hx         Meds:     Review of patient's allergies indicates:   Allergen Reactions    Nifedipine Other (See Comments)  "    Gingival hyperplasia     Current Medications[1]    Constitution: Negative for fever, chills, weight loss or gain.   HENT: Negative for sore throat, rhinorrhea, or headache.  Eyes: Negative for blurred or double vision.   Cardiovascular: See above  Pulmonary: Negative for SOB   Gastrointestinal: Negative for abdominal pain, nausea, vomiting, or diarrhea.   : Negative for dysuria.   Neurological: Negative for focal weakness or sensory changes.    Objective:   BP (!) 154/76 (Patient Position: Sitting)   Pulse (!) 54   Ht 6' 1" (1.854 m)   Wt 89.5 kg (197 lb 5 oz)   SpO2 97%   BMI 26.03 kg/m²   Constitutional: No distress, appropriately conversant  HEENT: Sclera anicteric, PERRLA  Neck: No JVD, no masses, good movement  CV: RRR, S1 and S2 normal, no additional heart sounds or murmurs  Pulm: Lung sounds clear bilaterally without respiratory distress   GI: Abdomen soft, non-tender, good bowel sounds  Extremities: Extremities grossly normal, warm, well perfused, no edema  Skin: No ecchymosis, erythema, or ulcers  Psych: AOx3, appropriate affect    Most Recent Lab Data:     CBC:   Lab Results   Component Value Date    WBC 6.32 07/31/2025    HGB 8.6 (L) 07/31/2025    HCT 28.6 (L) 07/31/2025     07/31/2025    MCV 70 (L) 07/31/2025    RDW 19.1 (H) 07/31/2025     BMP:   Lab Results   Component Value Date     07/31/2025    K 4.1 07/31/2025     07/31/2025    CO2 24 07/31/2025    BUN 58 (H) 07/31/2025    CREATININE 4.0 (H) 07/31/2025     (H) 07/31/2025    CALCIUM 9.4 07/31/2025    MG 1.8 07/31/2025    PHOS 3.5 07/31/2025     LFTS;   Lab Results   Component Value Date    PROT 6.6 07/31/2025    ALBUMIN 2.5 (L) 07/31/2025    BILITOT 0.3 07/31/2025    AST 23 07/31/2025    ALKPHOS 57 07/31/2025    ALT 8 07/31/2025     COAGS:   Lab Results   Component Value Date    INR 1.4 (H) 06/11/2025    PROTIME 14.7 (H) 06/11/2025     FLP:   Lab Results   Component Value Date    CHOL 172 12/07/2024    HDL 30 (L) " 12/07/2024    LDLCALC 105.4 12/07/2024    TRIG 183 (H) 12/07/2024    CHOLHDL 17.4 (L) 12/07/2024     CARDIAC:   Lab Results   Component Value Date    TROPONINI 0.812 (H) 06/15/2024    BNP >35,000 (H) 07/31/2025         Cardiovascular Imaging:     Echo 7/13/25:    Left Ventricle: The left ventricle is normal in size. Mildly increased wall thickness. There is concentric remodeling. Normal wall motion. There is low normal systolic function with a visually estimated ejection fraction of 50 - 55%. Grade III diastolic dysfunction. Elevated left ventricular filling pressure.    Right Ventricle: The right ventricle is normal in size measuring 3.5 cm. Wall thickness is normal. Systolic function is reduced.    Left Atrium: There is a small atrial septal defect in the mid/anterior septum that appears iatrogenic.  There is left to right shunting by color Doppler interrogation.  Velocities suggest that the left atrial pressure is markedly elevated.    Aortic Valve: There is mild aortic regurgitation.    Pulmonary Artery: There is moderate to severe pulmonary hypertension. The estimated pulmonary artery systolic pressure is 67 mmHg.    IVC/SVC: Normal venous pressure at 3 mmHg.    Assessment & Plan:     1. Chronic combined systolic (congestive) and diastolic (congestive) heart failure    2. Hypertension associated with stage 4 chronic kidney disease due to type 2 diabetes mellitus    3. Paroxysmal atrial fibrillation    4. Atrial flutter, unspecified type    5. History of CVA (cerebrovascular accident)    6. PAD (peripheral artery disease)    7. Type 2 diabetes mellitus with stage 3b chronic kidney disease, with long-term current use of insulin    8. Mixed hyperlipidemia        #HFrecEF  Euvolemic  Continue current lopressor 25 BID, Jardiance, Bidil  Continue current Torsemide    #Embolic CVA  #pAF  #Atrial flutter  Paroxysmal AF  S/p CTI/PVI/LA PWI with Dr. Bowden in June 2024  S/p redo PVI, R PV flutter ablation, and redo CTI  line 6/12/25  Prior R MCA stroke in 11/2024  Continue Xarelto  Continue EP follow up- sees Mima Lyons on 9/16/25    #HTN  Not controlled on HF meds above  Goal < 130/90  Will add clonidine 0.2 qhs     #HLD  #PAD   on atorvastatin 40 in December, increased to 80mg qd  Repeat lipid panel. Goal LDL < 70    #T2DM  A1c 6.5% recently on insulin pump and sees Endocrine  Goal A1c < 7     #CKD4  Progression of his disease  Nephrology is doing an MRI and considering a biopsy to rule out graft rejection      3 month f/u  This plan was formulated after discussion with Dr. Breaux Connor Gillies, DO, PGY-VI  [facility]         [1]   Current Outpatient Medications:     acetaminophen (TYLENOL) 500 MG tablet, Take 500 mg by mouth every 6 (six) hours as needed for Pain., Disp: , Rfl:     aspirin 81 mg Tab, Take 81 mg by mouth once daily., Disp: , Rfl:     atorvastatin (LIPITOR) 80 MG tablet, Take 1 tablet (80 mg total) by mouth once daily., Disp: 90 tablet, Rfl: 3    blood sugar diagnostic Strp, Use to check blood glucose 1 times daily, to use with insurance preferred meter, Disp: 100 each, Rfl: 11    blood-glucose sensor (DEXCOM G7 SENSOR) Kayla, Change sensor every 10 days., Disp: 3 each, Rfl: 11    doxazosin (CARDURA) 8 MG Tab, Take 1 tablet (8 mg total) by mouth once daily., Disp: 30 tablet, Rfl: 11    empagliflozin (JARDIANCE) 10 mg tablet, Take 1 tablet (10 mg total) by mouth once daily., Disp: 90 tablet, Rfl: 0    ergocalciferol (VITAMIN D2) 50,000 unit Cap, Take 1 capsule (50,000 Units total) by mouth every 7 days., Disp: 4 capsule, Rfl: 6    famotidine (PEPCID) 20 MG tablet, Take 1 tablet by mouth every evening., Disp: , Rfl:     ferrous sulfate (FEOSOL) 325 mg (65 mg iron) Tab tablet, Take 1 tablet (325 mg total) by mouth daily with breakfast., Disp: 90 tablet, Rfl: 0    gabapentin (NEURONTIN) 300 MG capsule, Take 1 capsule (300 mg total) by mouth 2 (two) times daily., Disp: 60  "capsule, Rfl: 11    insulin aspart U-100 (NOVOLOG FLEXPEN U-100 INSULIN) 100 unit/mL (3 mL) InPn pen, Use pump. Max daily 100 units, Disp: , Rfl:     insulin aspart U-100 (NOVOLOG U-100 INSULIN ASPART) 100 unit/mL injection, Use in pump, max daily 100 units., Disp: 30 mL, Rfl: 11    insulin pump cart,auto,BT,G6/7 (OMNIPOD 5 G6-G7 PODS, GEN 5,) Crtg, Change every 48 hours., Disp: 45 each, Rfl: 3    isosorbide-hydrALAZINE 20-37.5 mg (BIDIL) 20-37.5 mg Tab, Take 2 tablets by mouth 3 (three) times daily., Disp: 180 tablet, Rfl: 11    lancets 30 gauge Misc, Use to check blood glucose 1 times daily, to use with insurance preferred meter, Disp: 100 each, Rfl: 3    magnesium oxide (MAG-OX) 400 mg (241.3 mg magnesium) tablet, Take 1 tablet (400 mg total) by mouth 2 (two) times daily., Disp: 60 tablet, Rfl: 11    meclizine (ANTIVERT) 25 mg tablet, Take 1 tablet (25 mg total) by mouth 3 (three) times daily as needed for Dizziness., Disp: 21 tablet, Rfl: 3    melatonin 3 mg Cap, Take 2 capsules by mouth nightly as needed (Insomnia)., Disp: , Rfl:     metoprolol tartrate (LOPRESSOR) 25 MG tablet, Take 1 tablet (25 mg total) by mouth 2 (two) times daily., Disp: 180 tablet, Rfl: 3    mycophenolate (CELLCEPT) 250 mg Cap, Take 500 mg by mouth 2 (two) times daily., Disp: , Rfl:     pen needle, diabetic (BD ULTRA-FINE LO PEN NEEDLE) 32 gauge x 5/32" Ndle, USE TO ADMINISTER INSULIN 4 TIMES DAILY., Disp: 400 each, Rfl: 3    polyethylene glycol (MIRALAX) 17 gram PwPk, Take 17 g by mouth daily as needed for Constipation. Take 17 gm (mixed in liquid) by mouth every day., Disp: , Rfl:     potassium chloride SA (K-DUR,KLOR-CON) 20 MEQ tablet, Take 20 mEq by mouth., Disp: , Rfl:     predniSONE (DELTASONE) 5 MG tablet, Take 1 tablet (5 mg total) by mouth once daily., Disp: 30 tablet, Rfl: 11    rivaroxaban (XARELTO) 15 mg Tab, Take 1 tablet (15 mg total) by mouth daily with dinner or evening meal., Disp: 30 tablet, Rfl: 11    tacrolimus " (PROGRAF) 1 MG Cap, Take 1 capsule (1 mg total) by mouth every 12 (twelve) hours., Disp: 60 capsule, Rfl: 11    torsemide (DEMADEX) 20 MG Tab, Take 2 tablets (40 mg total) by mouth once daily., Disp: 60 tablet, Rfl: 11    Current Facility-Administered Medications:     epoetin tanya injection 4,000 Units, 4,000 Units, Subcutaneous, Q7 Days,

## 2025-08-07 ENCOUNTER — TELEPHONE (OUTPATIENT)
Dept: CARDIOLOGY | Facility: CLINIC | Age: 70
End: 2025-08-07
Payer: MEDICARE

## 2025-08-07 ENCOUNTER — LAB VISIT (OUTPATIENT)
Dept: LAB | Facility: HOSPITAL | Age: 70
End: 2025-08-07
Attending: STUDENT IN AN ORGANIZED HEALTH CARE EDUCATION/TRAINING PROGRAM
Payer: MEDICARE

## 2025-08-07 DIAGNOSIS — E78.2 MIXED HYPERLIPIDEMIA: ICD-10-CM

## 2025-08-07 DIAGNOSIS — R06.02 SOB (SHORTNESS OF BREATH): ICD-10-CM

## 2025-08-07 LAB
ABSOLUTE EOSINOPHIL (OHS): 0.2 K/UL
ABSOLUTE MONOCYTE (OHS): 1.04 K/UL (ref 0.3–1)
ABSOLUTE NEUTROPHIL COUNT (OHS): 4.13 K/UL (ref 1.8–7.7)
ALBUMIN SERPL BCP-MCNC: 2.8 G/DL (ref 3.5–5.2)
ALP SERPL-CCNC: 63 UNIT/L (ref 40–150)
ALT SERPL W/O P-5'-P-CCNC: <8 UNIT/L (ref 0–55)
ANION GAP (OHS): 10 MMOL/L (ref 8–16)
AST SERPL-CCNC: 49 UNIT/L (ref 0–50)
BASOPHILS # BLD AUTO: 0.05 K/UL
BASOPHILS NFR BLD AUTO: 0.7 %
BILIRUB SERPL-MCNC: 0.3 MG/DL (ref 0.1–1)
BUN SERPL-MCNC: 54 MG/DL (ref 8–23)
CALCIUM SERPL-MCNC: 9.5 MG/DL (ref 8.7–10.5)
CHLORIDE SERPL-SCNC: 104 MMOL/L (ref 95–110)
CHOLEST SERPL-MCNC: 93 MG/DL (ref 120–199)
CHOLEST/HDLC SERPL: 5.5 {RATIO} (ref 2–5)
CO2 SERPL-SCNC: 25 MMOL/L (ref 23–29)
CREAT SERPL-MCNC: 3.6 MG/DL (ref 0.5–1.4)
ERYTHROCYTE [DISTWIDTH] IN BLOOD BY AUTOMATED COUNT: 20.4 % (ref 11.5–14.5)
GFR SERPLBLD CREATININE-BSD FMLA CKD-EPI: 18 ML/MIN/1.73/M2
GLUCOSE SERPL-MCNC: 148 MG/DL (ref 70–110)
HCT VFR BLD AUTO: 29.7 % (ref 40–54)
HDLC SERPL-MCNC: 17 MG/DL (ref 40–75)
HDLC SERPL: 18.3 % (ref 20–50)
HGB BLD-MCNC: 8.5 GM/DL (ref 14–18)
IMM GRANULOCYTES # BLD AUTO: 0.04 K/UL (ref 0–0.04)
IMM GRANULOCYTES NFR BLD AUTO: 0.5 % (ref 0–0.5)
LDLC SERPL CALC-MCNC: 49.4 MG/DL (ref 63–159)
LYMPHOCYTES # BLD AUTO: 2.06 K/UL (ref 1–4.8)
MAGNESIUM SERPL-MCNC: 2 MG/DL (ref 1.6–2.6)
MCH RBC QN AUTO: 20.5 PG (ref 27–31)
MCHC RBC AUTO-ENTMCNC: 28.6 G/DL (ref 32–36)
MCV RBC AUTO: 72 FL (ref 82–98)
NONHDLC SERPL-MCNC: 76 MG/DL
NT-PROBNP SERPL-MCNC: ABNORMAL PG/ML
NUCLEATED RBC (/100WBC) (OHS): 0 /100 WBC
PHOSPHATE SERPL-MCNC: 3.5 MG/DL (ref 2.7–4.5)
PLATELET # BLD AUTO: 187 K/UL (ref 150–450)
PMV BLD AUTO: ABNORMAL FL
POTASSIUM SERPL-SCNC: 4.1 MMOL/L (ref 3.5–5.1)
PROT SERPL-MCNC: 6.8 GM/DL (ref 6–8.4)
RBC # BLD AUTO: 4.15 M/UL (ref 4.6–6.2)
RELATIVE EOSINOPHIL (OHS): 2.7 %
RELATIVE LYMPHOCYTE (OHS): 27.4 % (ref 18–48)
RELATIVE MONOCYTE (OHS): 13.8 % (ref 4–15)
RELATIVE NEUTROPHIL (OHS): 54.9 % (ref 38–73)
SODIUM SERPL-SCNC: 139 MMOL/L (ref 136–145)
TRIGL SERPL-MCNC: 133 MG/DL (ref 30–150)
WBC # BLD AUTO: 7.52 K/UL (ref 3.9–12.7)

## 2025-08-07 PROCEDURE — 82040 ASSAY OF SERUM ALBUMIN: CPT | Mod: HCNC

## 2025-08-07 PROCEDURE — 83735 ASSAY OF MAGNESIUM: CPT | Mod: HCNC

## 2025-08-07 PROCEDURE — 84478 ASSAY OF TRIGLYCERIDES: CPT | Mod: HCNC

## 2025-08-07 PROCEDURE — 83880 ASSAY OF NATRIURETIC PEPTIDE: CPT | Mod: HCNC

## 2025-08-07 PROCEDURE — 84100 ASSAY OF PHOSPHORUS: CPT | Mod: HCNC

## 2025-08-07 PROCEDURE — 85025 COMPLETE CBC W/AUTO DIFF WBC: CPT | Mod: HCNC

## 2025-08-07 PROCEDURE — 36415 COLL VENOUS BLD VENIPUNCTURE: CPT | Mod: HCNC,PN

## 2025-08-07 NOTE — TELEPHONE ENCOUNTER
"[facility]    Attempted to call pt to complete 1 week "check in" call. Unable to reach pt at listed phone numbers.  Was able to leave message on voicemail encouraging pt to return call with [facility] phone number.     Will continue to try to reach patient.    "

## 2025-08-08 ENCOUNTER — HOSPITAL ENCOUNTER (OUTPATIENT)
Facility: HOSPITAL | Age: 70
Discharge: HOME | End: 2025-08-09
Attending: EMERGENCY MEDICINE | Admitting: EMERGENCY MEDICINE
Payer: MEDICARE

## 2025-08-08 ENCOUNTER — TELEPHONE (OUTPATIENT)
Dept: UROLOGY | Facility: CLINIC | Age: 70
End: 2025-08-08
Payer: MEDICARE

## 2025-08-08 DIAGNOSIS — R06.02 SOB (SHORTNESS OF BREATH): ICD-10-CM

## 2025-08-08 DIAGNOSIS — I50.9 ACUTE ON CHRONIC CONGESTIVE HEART FAILURE, UNSPECIFIED HEART FAILURE TYPE: Primary | ICD-10-CM

## 2025-08-08 DIAGNOSIS — I10 HYPERTENSION: ICD-10-CM

## 2025-08-08 PROBLEM — I27.20 PULMONARY HYPERTENSION: Status: ACTIVE | Noted: 2025-08-08

## 2025-08-08 PROBLEM — E87.6 HYPOKALEMIA: Status: RESOLVED | Noted: 2025-02-20 | Resolved: 2025-08-08

## 2025-08-08 LAB
ABSOLUTE EOSINOPHIL (OHS): 0.14 K/UL
ABSOLUTE MONOCYTE (OHS): 0.84 K/UL (ref 0.3–1)
ABSOLUTE NEUTROPHIL COUNT (OHS): 3.39 K/UL (ref 1.8–7.7)
ALBUMIN SERPL BCP-MCNC: 2.9 G/DL (ref 3.5–5.2)
ALP SERPL-CCNC: 67 UNIT/L (ref 40–150)
ALT SERPL W/O P-5'-P-CCNC: <8 UNIT/L (ref 0–55)
ANION GAP (OHS): 12 MMOL/L (ref 8–16)
AST SERPL-CCNC: 19 UNIT/L (ref 0–50)
BACTERIA #/AREA URNS AUTO: NORMAL /HPF
BASOPHILS # BLD AUTO: 0.03 K/UL
BASOPHILS NFR BLD AUTO: 0.5 %
BILIRUB SERPL-MCNC: 0.4 MG/DL (ref 0.1–1)
BILIRUB UR QL STRIP.AUTO: NEGATIVE
BIPAP: 0
BUN SERPL-MCNC: 57 MG/DL (ref 8–23)
CALCIUM SERPL-MCNC: 9.1 MG/DL (ref 8.7–10.5)
CHLORIDE SERPL-SCNC: 102 MMOL/L (ref 95–110)
CLARITY UR: CLEAR
CO2 SERPL-SCNC: 23 MMOL/L (ref 23–29)
COLOR UR AUTO: YELLOW
CREAT SERPL-MCNC: 3.4 MG/DL (ref 0.5–1.4)
ERYTHROCYTE [DISTWIDTH] IN BLOOD BY AUTOMATED COUNT: 20.3 % (ref 11.5–14.5)
FLUAV AG UPPER RESP QL IA.RAPID: NEGATIVE
FLUBV AG UPPER RESP QL IA.RAPID: NEGATIVE
GFR SERPLBLD CREATININE-BSD FMLA CKD-EPI: 19 ML/MIN/1.73/M2
GLUCOSE SERPL-MCNC: 124 MG/DL (ref 70–110)
GLUCOSE UR QL STRIP: ABNORMAL
HCT VFR BLD AUTO: 29.4 % (ref 40–54)
HGB BLD-MCNC: 8.7 GM/DL (ref 14–18)
HGB UR QL STRIP: NEGATIVE
HYALINE CASTS UR QL AUTO: 0 /LPF (ref 0–1)
IMM GRANULOCYTES # BLD AUTO: 0.04 K/UL (ref 0–0.04)
IMM GRANULOCYTES NFR BLD AUTO: 0.6 % (ref 0–0.5)
KETONES UR QL STRIP: NEGATIVE
LDH SERPL L TO P-CCNC: 1.8 MMOL/L
LEUKOCYTE ESTERASE UR QL STRIP: NEGATIVE
LYMPHOCYTES # BLD AUTO: 2.05 K/UL (ref 1–4.8)
MCH RBC QN AUTO: 20.6 PG (ref 27–31)
MCHC RBC AUTO-ENTMCNC: 29.6 G/DL (ref 32–36)
MCV RBC AUTO: 70 FL (ref 82–98)
MICROSCOPIC COMMENT: NORMAL
NITRITE UR QL STRIP: NEGATIVE
NT-PROBNP SERPL-MCNC: ABNORMAL PG/ML
NUCLEATED RBC (/100WBC) (OHS): 0 /100 WBC
OHS QRS DURATION: 106 MS
OHS QRS DURATION: 124 MS
OHS QTC CALCULATION: 440 MS
OHS QTC CALCULATION: 475 MS
PH UR STRIP: 7 [PH]
PLATELET # BLD AUTO: 183 K/UL (ref 150–450)
PMV BLD AUTO: ABNORMAL FL
POC PERFORMED BY: NORMAL
POCT GLUCOSE: 190 MG/DL (ref 70–110)
POCT GLUCOSE: 202 MG/DL (ref 70–110)
POCT GLUCOSE: 66 MG/DL (ref 70–110)
POCT GLUCOSE: 89 MG/DL (ref 70–110)
POTASSIUM SERPL-SCNC: 3.9 MMOL/L (ref 3.5–5.1)
PROT SERPL-MCNC: 6.9 GM/DL (ref 6–8.4)
PROT UR QL STRIP: ABNORMAL
RBC # BLD AUTO: 4.23 M/UL (ref 4.6–6.2)
RBC #/AREA URNS AUTO: 1 /HPF (ref 0–4)
RELATIVE EOSINOPHIL (OHS): 2.2 %
RELATIVE LYMPHOCYTE (OHS): 31.6 % (ref 18–48)
RELATIVE MONOCYTE (OHS): 12.9 % (ref 4–15)
RELATIVE NEUTROPHIL (OHS): 52.2 % (ref 38–73)
RSV A 5' UTR RNA NPH QL NAA+PROBE: NEGATIVE
SARS-COV-2 RNA RESP QL NAA+PROBE: NEGATIVE
SODIUM SERPL-SCNC: 137 MMOL/L (ref 136–145)
SP GR UR STRIP: 1.01
SPECIMEN SOURCE: NORMAL
TACROLIMUS BLD-MCNC: 8.7 NG/ML (ref 5–15)
TROPONIN I SERPL HS-MCNC: 71 NG/L
TROPONIN I SERPL HS-MCNC: 78 NG/L
UROBILINOGEN UR STRIP-ACNC: NEGATIVE EU/DL
WBC # BLD AUTO: 6.49 K/UL (ref 3.9–12.7)
WBC #/AREA URNS AUTO: <1 /HPF (ref 0–5)

## 2025-08-08 PROCEDURE — 63600175 PHARM REV CODE 636 W HCPCS: Mod: HCNC

## 2025-08-08 PROCEDURE — 84484 ASSAY OF TROPONIN QUANT: CPT | Mod: HCNC

## 2025-08-08 PROCEDURE — 80197 ASSAY OF TACROLIMUS: CPT | Mod: HCNC

## 2025-08-08 PROCEDURE — 93005 ELECTROCARDIOGRAM TRACING: CPT | Mod: HCNC

## 2025-08-08 PROCEDURE — 25000003 PHARM REV CODE 250: Mod: HCNC

## 2025-08-08 PROCEDURE — 80053 COMPREHEN METABOLIC PANEL: CPT | Mod: HCNC

## 2025-08-08 PROCEDURE — G0378 HOSPITAL OBSERVATION PER HR: HCPCS | Mod: HCNC

## 2025-08-08 PROCEDURE — 99900035 HC TECH TIME PER 15 MIN (STAT): Mod: HCNC

## 2025-08-08 PROCEDURE — 99215 OFFICE O/P EST HI 40 MIN: CPT | Mod: HCNC,GC,, | Performed by: INTERNAL MEDICINE

## 2025-08-08 PROCEDURE — 83880 ASSAY OF NATRIURETIC PEPTIDE: CPT | Mod: HCNC

## 2025-08-08 PROCEDURE — 63600175 PHARM REV CODE 636 W HCPCS: Mod: HCNC | Performed by: HOSPITALIST

## 2025-08-08 PROCEDURE — 81001 URINALYSIS AUTO W/SCOPE: CPT | Mod: HCNC

## 2025-08-08 PROCEDURE — 93010 ELECTROCARDIOGRAM REPORT: CPT | Mod: HCNC,76,, | Performed by: INTERNAL MEDICINE

## 2025-08-08 PROCEDURE — 93010 ELECTROCARDIOGRAM REPORT: CPT | Mod: HCNC,,, | Performed by: INTERNAL MEDICINE

## 2025-08-08 PROCEDURE — 87637 SARSCOV2&INF A&B&RSV AMP PRB: CPT | Mod: HCNC

## 2025-08-08 PROCEDURE — 83605 ASSAY OF LACTIC ACID: CPT | Mod: HCNC

## 2025-08-08 PROCEDURE — 85025 COMPLETE CBC W/AUTO DIFF WBC: CPT | Mod: HCNC

## 2025-08-08 RX ORDER — METOPROLOL TARTRATE 25 MG/1
25 TABLET, FILM COATED ORAL
Status: COMPLETED | OUTPATIENT
Start: 2025-08-08 | End: 2025-08-08

## 2025-08-08 RX ORDER — ATORVASTATIN CALCIUM 40 MG/1
80 TABLET, FILM COATED ORAL DAILY
Status: DISCONTINUED | OUTPATIENT
Start: 2025-08-09 | End: 2025-08-09 | Stop reason: HOSPADM

## 2025-08-08 RX ORDER — IBUPROFEN 200 MG
16 TABLET ORAL
Status: DISCONTINUED | OUTPATIENT
Start: 2025-08-08 | End: 2025-08-09 | Stop reason: HOSPADM

## 2025-08-08 RX ORDER — CLONIDINE HYDROCHLORIDE 0.1 MG/1
0.2 TABLET ORAL
Status: COMPLETED | OUTPATIENT
Start: 2025-08-08 | End: 2025-08-08

## 2025-08-08 RX ORDER — INSULIN ASPART 100 [IU]/ML
0-5 INJECTION, SOLUTION INTRAVENOUS; SUBCUTANEOUS
Status: DISCONTINUED | OUTPATIENT
Start: 2025-08-08 | End: 2025-08-09 | Stop reason: HOSPADM

## 2025-08-08 RX ORDER — FUROSEMIDE 10 MG/ML
40 INJECTION INTRAMUSCULAR; INTRAVENOUS
Status: COMPLETED | OUTPATIENT
Start: 2025-08-08 | End: 2025-08-08

## 2025-08-08 RX ORDER — SODIUM CHLORIDE 0.9 % (FLUSH) 0.9 %
10 SYRINGE (ML) INJECTION
Status: DISCONTINUED | OUTPATIENT
Start: 2025-08-08 | End: 2025-08-09 | Stop reason: HOSPADM

## 2025-08-08 RX ORDER — LABETALOL HCL 20 MG/4 ML
10 SYRINGE (ML) INTRAVENOUS EVERY 4 HOURS PRN
Status: DISCONTINUED | OUTPATIENT
Start: 2025-08-08 | End: 2025-08-09 | Stop reason: HOSPADM

## 2025-08-08 RX ORDER — METOPROLOL TARTRATE 25 MG/1
25 TABLET, FILM COATED ORAL 2 TIMES DAILY
Status: DISCONTINUED | OUTPATIENT
Start: 2025-08-08 | End: 2025-08-09

## 2025-08-08 RX ORDER — NAPROXEN SODIUM 220 MG/1
81 TABLET, FILM COATED ORAL DAILY
Status: DISCONTINUED | OUTPATIENT
Start: 2025-08-08 | End: 2025-08-09 | Stop reason: HOSPADM

## 2025-08-08 RX ORDER — IBUPROFEN 200 MG
24 TABLET ORAL
Status: DISCONTINUED | OUTPATIENT
Start: 2025-08-08 | End: 2025-08-09 | Stop reason: HOSPADM

## 2025-08-08 RX ORDER — PREDNISONE 2.5 MG/1
5 TABLET ORAL DAILY
Status: DISCONTINUED | OUTPATIENT
Start: 2025-08-09 | End: 2025-08-09 | Stop reason: HOSPADM

## 2025-08-08 RX ORDER — DOXAZOSIN 2 MG/1
8 TABLET ORAL DAILY
Status: DISCONTINUED | OUTPATIENT
Start: 2025-08-09 | End: 2025-08-09 | Stop reason: HOSPADM

## 2025-08-08 RX ORDER — GLUCAGON 1 MG
1 KIT INJECTION
Status: DISCONTINUED | OUTPATIENT
Start: 2025-08-08 | End: 2025-08-09 | Stop reason: HOSPADM

## 2025-08-08 RX ORDER — HYDRALAZINE HYDROCHLORIDE 20 MG/ML
10 INJECTION INTRAMUSCULAR; INTRAVENOUS EVERY 4 HOURS PRN
Status: DISCONTINUED | OUTPATIENT
Start: 2025-08-08 | End: 2025-08-09

## 2025-08-08 RX ORDER — TACROLIMUS 1 MG/1
1 CAPSULE ORAL 2 TIMES DAILY
Status: DISCONTINUED | OUTPATIENT
Start: 2025-08-08 | End: 2025-08-09 | Stop reason: HOSPADM

## 2025-08-08 RX ORDER — GABAPENTIN 300 MG/1
300 CAPSULE ORAL 2 TIMES DAILY
Status: DISCONTINUED | OUTPATIENT
Start: 2025-08-08 | End: 2025-08-09 | Stop reason: HOSPADM

## 2025-08-08 RX ORDER — FUROSEMIDE 10 MG/ML
80 INJECTION INTRAMUSCULAR; INTRAVENOUS 2 TIMES DAILY
Status: DISCONTINUED | OUTPATIENT
Start: 2025-08-08 | End: 2025-08-09 | Stop reason: HOSPADM

## 2025-08-08 RX ORDER — FAMOTIDINE 20 MG/1
20 TABLET, FILM COATED ORAL NIGHTLY
Status: DISCONTINUED | OUTPATIENT
Start: 2025-08-08 | End: 2025-08-09 | Stop reason: HOSPADM

## 2025-08-08 RX ADMIN — HYDRALAZINE HYDROCHLORIDE 10 MG: 20 INJECTION, SOLUTION INTRAMUSCULAR; INTRAVENOUS at 08:08

## 2025-08-08 RX ADMIN — FUROSEMIDE 80 MG: 10 INJECTION, SOLUTION INTRAVENOUS at 08:08

## 2025-08-08 RX ADMIN — ASPIRIN 81 MG CHEWABLE TABLET 81 MG: 81 TABLET CHEWABLE at 04:08

## 2025-08-08 RX ADMIN — HYDRALAZINE HYDROCHLORIDE: 25 TABLET ORAL at 10:08

## 2025-08-08 RX ADMIN — FUROSEMIDE 80 MG: 10 INJECTION, SOLUTION INTRAVENOUS at 06:08

## 2025-08-08 RX ADMIN — FAMOTIDINE 20 MG: 20 TABLET, FILM COATED ORAL at 10:08

## 2025-08-08 RX ADMIN — METOPROLOL TARTRATE 25 MG: 25 TABLET, FILM COATED ORAL at 03:08

## 2025-08-08 RX ADMIN — TACROLIMUS 1 MG: 1 CAPSULE ORAL at 06:08

## 2025-08-08 RX ADMIN — HYDRALAZINE HYDROCHLORIDE: 25 TABLET ORAL at 04:08

## 2025-08-08 RX ADMIN — CLONIDINE HYDROCHLORIDE 0.2 MG: 0.1 TABLET ORAL at 03:08

## 2025-08-08 RX ADMIN — FUROSEMIDE 40 MG: 10 INJECTION, SOLUTION INTRAVENOUS at 05:08

## 2025-08-08 RX ADMIN — RIVAROXABAN 15 MG: 15 TABLET, FILM COATED ORAL at 10:08

## 2025-08-08 RX ADMIN — METOPROLOL TARTRATE 25 MG: 25 TABLET, FILM COATED ORAL at 10:08

## 2025-08-08 RX ADMIN — GABAPENTIN 300 MG: 300 CAPSULE ORAL at 10:08

## 2025-08-08 RX ADMIN — FUROSEMIDE 40 MG: 10 INJECTION, SOLUTION INTRAVENOUS at 07:08

## 2025-08-09 VITALS
BODY MASS INDEX: 25.48 KG/M2 | SYSTOLIC BLOOD PRESSURE: 163 MMHG | TEMPERATURE: 98 F | DIASTOLIC BLOOD PRESSURE: 87 MMHG | OXYGEN SATURATION: 95 % | HEART RATE: 62 BPM | RESPIRATION RATE: 20 BRPM | HEIGHT: 73 IN | WEIGHT: 192.25 LBS

## 2025-08-09 LAB
ABSOLUTE EOSINOPHIL (OHS): 0.25 K/UL
ABSOLUTE MONOCYTE (OHS): 0.8 K/UL (ref 0.3–1)
ABSOLUTE NEUTROPHIL COUNT (OHS): 2.85 K/UL (ref 1.8–7.7)
ALBUMIN SERPL BCP-MCNC: 2.9 G/DL (ref 3.5–5.2)
ALP SERPL-CCNC: 67 UNIT/L (ref 40–150)
ALT SERPL W/O P-5'-P-CCNC: <8 UNIT/L (ref 0–55)
ANION GAP (OHS): 13 MMOL/L (ref 8–16)
AST SERPL-CCNC: 17 UNIT/L (ref 0–50)
BASOPHILS # BLD AUTO: 0.04 K/UL
BASOPHILS NFR BLD AUTO: 0.7 %
BILIRUB SERPL-MCNC: 0.5 MG/DL (ref 0.1–1)
BUN SERPL-MCNC: 55 MG/DL (ref 8–23)
CALCIUM SERPL-MCNC: 8.7 MG/DL (ref 8.7–10.5)
CHLORIDE SERPL-SCNC: 102 MMOL/L (ref 95–110)
CO2 SERPL-SCNC: 20 MMOL/L (ref 23–29)
CREAT SERPL-MCNC: 3.3 MG/DL (ref 0.5–1.4)
ERYTHROCYTE [DISTWIDTH] IN BLOOD BY AUTOMATED COUNT: 20.7 % (ref 11.5–14.5)
GFR SERPLBLD CREATININE-BSD FMLA CKD-EPI: 20 ML/MIN/1.73/M2
GLUCOSE SERPL-MCNC: 122 MG/DL (ref 70–110)
HCT VFR BLD AUTO: 30.7 % (ref 40–54)
HGB BLD-MCNC: 9.1 GM/DL (ref 14–18)
HOLD SPECIMEN: NORMAL
IMM GRANULOCYTES # BLD AUTO: 0.02 K/UL (ref 0–0.04)
IMM GRANULOCYTES NFR BLD AUTO: 0.4 % (ref 0–0.5)
LYMPHOCYTES # BLD AUTO: 1.53 K/UL (ref 1–4.8)
MAGNESIUM SERPL-MCNC: 1.9 MG/DL (ref 1.6–2.6)
MCH RBC QN AUTO: 20.4 PG (ref 27–31)
MCHC RBC AUTO-ENTMCNC: 29.6 G/DL (ref 32–36)
MCV RBC AUTO: 69 FL (ref 82–98)
NUCLEATED RBC (/100WBC) (OHS): 0 /100 WBC
OHS QRS DURATION: 132 MS
OHS QTC CALCULATION: 457 MS
PHOSPHATE SERPL-MCNC: 3.7 MG/DL (ref 2.7–4.5)
PLATELET # BLD AUTO: 176 K/UL (ref 150–450)
PMV BLD AUTO: ABNORMAL FL
POCT GLUCOSE: 110 MG/DL (ref 70–110)
POCT GLUCOSE: 219 MG/DL (ref 70–110)
POCT GLUCOSE: 256 MG/DL (ref 70–110)
POTASSIUM SERPL-SCNC: 3.4 MMOL/L (ref 3.5–5.1)
PROT SERPL-MCNC: 6.8 GM/DL (ref 6–8.4)
RBC # BLD AUTO: 4.46 M/UL (ref 4.6–6.2)
RELATIVE EOSINOPHIL (OHS): 4.6 %
RELATIVE LYMPHOCYTE (OHS): 27.9 % (ref 18–48)
RELATIVE MONOCYTE (OHS): 14.6 % (ref 4–15)
RELATIVE NEUTROPHIL (OHS): 51.8 % (ref 38–73)
SODIUM SERPL-SCNC: 135 MMOL/L (ref 136–145)
WBC # BLD AUTO: 5.49 K/UL (ref 3.9–12.7)

## 2025-08-09 PROCEDURE — 63600175 PHARM REV CODE 636 W HCPCS: Mod: HCNC

## 2025-08-09 PROCEDURE — 25000003 PHARM REV CODE 250: Mod: HCNC | Performed by: INTERNAL MEDICINE

## 2025-08-09 PROCEDURE — 99214 OFFICE O/P EST MOD 30 MIN: CPT | Mod: HCNC,GC,, | Performed by: INTERNAL MEDICINE

## 2025-08-09 PROCEDURE — 96366 THER/PROPH/DIAG IV INF ADDON: CPT

## 2025-08-09 PROCEDURE — 25000003 PHARM REV CODE 250: Mod: HCNC | Performed by: PHYSICIAN ASSISTANT

## 2025-08-09 PROCEDURE — 36415 COLL VENOUS BLD VENIPUNCTURE: CPT | Mod: HCNC | Performed by: HOSPITALIST

## 2025-08-09 PROCEDURE — 96372 THER/PROPH/DIAG INJ SC/IM: CPT

## 2025-08-09 PROCEDURE — 25000003 PHARM REV CODE 250: Mod: HCNC

## 2025-08-09 PROCEDURE — 93010 ELECTROCARDIOGRAM REPORT: CPT | Mod: HCNC,,, | Performed by: INTERNAL MEDICINE

## 2025-08-09 PROCEDURE — 96365 THER/PROPH/DIAG IV INF INIT: CPT

## 2025-08-09 PROCEDURE — 84100 ASSAY OF PHOSPHORUS: CPT | Mod: HCNC | Performed by: HOSPITALIST

## 2025-08-09 PROCEDURE — G0378 HOSPITAL OBSERVATION PER HR: HCPCS | Mod: HCNC

## 2025-08-09 PROCEDURE — 80053 COMPREHEN METABOLIC PANEL: CPT | Mod: HCNC | Performed by: HOSPITALIST

## 2025-08-09 PROCEDURE — 63600175 PHARM REV CODE 636 W HCPCS: Mod: HCNC | Performed by: HOSPITALIST

## 2025-08-09 PROCEDURE — 85025 COMPLETE CBC W/AUTO DIFF WBC: CPT | Mod: HCNC | Performed by: HOSPITALIST

## 2025-08-09 PROCEDURE — 63600175 PHARM REV CODE 636 W HCPCS: Mod: HCNC | Performed by: INTERNAL MEDICINE

## 2025-08-09 PROCEDURE — 93005 ELECTROCARDIOGRAM TRACING: CPT | Mod: HCNC

## 2025-08-09 PROCEDURE — 83735 ASSAY OF MAGNESIUM: CPT | Mod: HCNC | Performed by: HOSPITALIST

## 2025-08-09 PROCEDURE — 25000003 PHARM REV CODE 250: Mod: HCNC | Performed by: HOSPITALIST

## 2025-08-09 RX ORDER — POTASSIUM CHLORIDE 20 MEQ/1
40 TABLET, EXTENDED RELEASE ORAL ONCE
Status: COMPLETED | OUTPATIENT
Start: 2025-08-09 | End: 2025-08-09

## 2025-08-09 RX ORDER — MYCOPHENOLATE MOFETIL 250 MG/1
500 CAPSULE ORAL 2 TIMES DAILY
Status: DISCONTINUED | OUTPATIENT
Start: 2025-08-09 | End: 2025-08-09 | Stop reason: HOSPADM

## 2025-08-09 RX ORDER — SODIUM BICARBONATE 650 MG/1
650 TABLET ORAL 2 TIMES DAILY
Status: DISCONTINUED | OUTPATIENT
Start: 2025-08-09 | End: 2025-08-09 | Stop reason: HOSPADM

## 2025-08-09 RX ORDER — MAGNESIUM SULFATE 1 G/100ML
1 INJECTION INTRAVENOUS ONCE
Status: COMPLETED | OUTPATIENT
Start: 2025-08-09 | End: 2025-08-09

## 2025-08-09 RX ORDER — POTASSIUM CHLORIDE 20 MEQ/1
40 TABLET, EXTENDED RELEASE ORAL ONCE
Status: DISCONTINUED | OUTPATIENT
Start: 2025-08-09 | End: 2025-08-09

## 2025-08-09 RX ORDER — CARVEDILOL 25 MG/1
25 TABLET ORAL 2 TIMES DAILY
Qty: 60 TABLET | Refills: 4 | Status: SHIPPED | OUTPATIENT
Start: 2025-08-09 | End: 2026-08-09

## 2025-08-09 RX ORDER — TALC
6 POWDER (GRAM) TOPICAL NIGHTLY PRN
Status: DISCONTINUED | OUTPATIENT
Start: 2025-08-09 | End: 2025-08-09 | Stop reason: HOSPADM

## 2025-08-09 RX ORDER — CARVEDILOL 25 MG/1
25 TABLET ORAL 2 TIMES DAILY
Status: DISCONTINUED | OUTPATIENT
Start: 2025-08-09 | End: 2025-08-09 | Stop reason: HOSPADM

## 2025-08-09 RX ADMIN — ATORVASTATIN CALCIUM 80 MG: 40 TABLET, FILM COATED ORAL at 08:08

## 2025-08-09 RX ADMIN — HYDRALAZINE HYDROCHLORIDE: 25 TABLET ORAL at 02:08

## 2025-08-09 RX ADMIN — DOXAZOSIN 8 MG: 2 TABLET ORAL at 08:08

## 2025-08-09 RX ADMIN — RIVAROXABAN 15 MG: 15 TABLET, FILM COATED ORAL at 04:08

## 2025-08-09 RX ADMIN — HYDRALAZINE HYDROCHLORIDE 10 MG: 20 INJECTION, SOLUTION INTRAMUSCULAR; INTRAVENOUS at 01:08

## 2025-08-09 RX ADMIN — MYCOPHENOLATE MOFETIL 500 MG: 250 CAPSULE ORAL at 11:08

## 2025-08-09 RX ADMIN — INSULIN ASPART 2 UNITS: 100 INJECTION, SOLUTION INTRAVENOUS; SUBCUTANEOUS at 12:08

## 2025-08-09 RX ADMIN — HYDRALAZINE HYDROCHLORIDE: 25 TABLET ORAL at 08:08

## 2025-08-09 RX ADMIN — POTASSIUM CHLORIDE 40 MEQ: 1500 TABLET, EXTENDED RELEASE ORAL at 08:08

## 2025-08-09 RX ADMIN — FUROSEMIDE 80 MG: 10 INJECTION, SOLUTION INTRAVENOUS at 10:08

## 2025-08-09 RX ADMIN — CARVEDILOL 25 MG: 25 TABLET, FILM COATED ORAL at 03:08

## 2025-08-09 RX ADMIN — TACROLIMUS 1 MG: 1 CAPSULE ORAL at 04:08

## 2025-08-09 RX ADMIN — Medication 6 MG: at 12:08

## 2025-08-09 RX ADMIN — EMPAGLIFLOZIN 10 MG: 10 TABLET, FILM COATED ORAL at 08:08

## 2025-08-09 RX ADMIN — SODIUM BICARBONATE 650 MG TABLET 650 MG: at 11:08

## 2025-08-09 RX ADMIN — HYDRALAZINE HYDROCHLORIDE 10 MG: 20 INJECTION, SOLUTION INTRAMUSCULAR; INTRAVENOUS at 12:08

## 2025-08-09 RX ADMIN — TACROLIMUS 1 MG: 1 CAPSULE ORAL at 08:08

## 2025-08-09 RX ADMIN — GABAPENTIN 300 MG: 300 CAPSULE ORAL at 08:08

## 2025-08-09 RX ADMIN — ASPIRIN 81 MG CHEWABLE TABLET 81 MG: 81 TABLET CHEWABLE at 08:08

## 2025-08-09 RX ADMIN — FUROSEMIDE 80 MG: 10 INJECTION, SOLUTION INTRAVENOUS at 05:08

## 2025-08-09 RX ADMIN — MAGNESIUM SULFATE HEPTAHYDRATE 1 G: 500 INJECTION, SOLUTION INTRAMUSCULAR; INTRAVENOUS at 08:08

## 2025-08-09 RX ADMIN — PREDNISONE 5 MG: 2.5 TABLET ORAL at 08:08

## 2025-08-11 ENCOUNTER — OUTPATIENT CASE MANAGEMENT (OUTPATIENT)
Dept: ADMINISTRATIVE | Facility: OTHER | Age: 70
End: 2025-08-11
Payer: MEDICARE

## 2025-08-14 ENCOUNTER — CLINICAL SUPPORT (OUTPATIENT)
Dept: NEPHROLOGY | Facility: CLINIC | Age: 70
End: 2025-08-14
Payer: MEDICARE

## 2025-08-14 ENCOUNTER — OFFICE VISIT (OUTPATIENT)
Dept: NEPHROLOGY | Facility: CLINIC | Age: 70
End: 2025-08-14
Payer: MEDICARE

## 2025-08-14 VITALS — BODY MASS INDEX: 25.36 KG/M2 | OXYGEN SATURATION: 97 % | HEART RATE: 50 BPM | WEIGHT: 192.25 LBS

## 2025-08-14 DIAGNOSIS — N18.1 CKD (CHRONIC KIDNEY DISEASE) STAGE 1, GFR 90 ML/MIN OR GREATER: Primary | ICD-10-CM

## 2025-08-14 DIAGNOSIS — D63.8 ANEMIA OF CHRONIC DISEASE: Primary | ICD-10-CM

## 2025-08-14 PROCEDURE — 96372 THER/PROPH/DIAG INJ SC/IM: CPT | Mod: HCNC,S$GLB,,

## 2025-08-14 PROCEDURE — 99999 PR PBB SHADOW E&M-EST. PATIENT-LVL III: CPT | Mod: PBBFAC,HCNC,GC,

## 2025-08-14 PROCEDURE — 99999 PR PBB SHADOW E&M-EST. PATIENT-LVL II: CPT | Mod: PBBFAC,HCNC,,

## 2025-08-14 RX ORDER — LOSARTAN POTASSIUM 50 MG/1
50 TABLET ORAL DAILY
Qty: 90 TABLET | Refills: 3 | Status: SHIPPED | OUTPATIENT
Start: 2025-08-14 | End: 2026-08-14

## 2025-08-14 RX ORDER — LOSARTAN POTASSIUM 50 MG/1
50 TABLET ORAL DAILY
Qty: 90 TABLET | Refills: 3 | Status: SHIPPED | OUTPATIENT
Start: 2025-08-14 | End: 2025-08-14

## 2025-08-15 ENCOUNTER — OUTPATIENT CASE MANAGEMENT (OUTPATIENT)
Dept: ADMINISTRATIVE | Facility: OTHER | Age: 70
End: 2025-08-15
Payer: MEDICARE

## 2025-08-18 VITALS
DIASTOLIC BLOOD PRESSURE: 67 MMHG | OXYGEN SATURATION: 98 % | SYSTOLIC BLOOD PRESSURE: 150 MMHG | WEIGHT: 192 LBS | HEART RATE: 62 BPM | BODY MASS INDEX: 25.33 KG/M2

## 2025-08-21 ENCOUNTER — RESULTS FOLLOW-UP (OUTPATIENT)
Dept: CARDIOLOGY | Facility: HOSPITAL | Age: 70
End: 2025-08-21
Payer: MEDICARE

## 2025-08-21 ENCOUNTER — OUTPATIENT CASE MANAGEMENT (OUTPATIENT)
Dept: ADMINISTRATIVE | Facility: OTHER | Age: 70
End: 2025-08-21
Payer: MEDICARE

## 2025-08-25 ENCOUNTER — HOSPITAL ENCOUNTER (OUTPATIENT)
Dept: RADIOLOGY | Facility: HOSPITAL | Age: 70
Discharge: HOME | End: 2025-08-25
Attending: UROLOGY
Payer: MEDICARE

## 2025-08-25 DIAGNOSIS — N28.89 OTHER SPECIFIED DISORDERS OF KIDNEY AND URETER: ICD-10-CM

## 2025-08-25 PROCEDURE — A9585 GADOBUTROL INJECTION: HCPCS | Performed by: UROLOGY

## 2025-08-25 PROCEDURE — 25500020 PHARM REV CODE 255: Performed by: UROLOGY

## 2025-08-25 PROCEDURE — 74183 MRI ABD W/O CNTR FLWD CNTR: CPT | Mod: TC

## 2025-08-25 RX ORDER — GADOBUTROL 604.72 MG/ML
10 INJECTION INTRAVENOUS
Status: COMPLETED | OUTPATIENT
Start: 2025-08-25 | End: 2025-08-25

## 2025-08-25 RX ADMIN — GADOBUTROL 10 ML: 604.72 INJECTION INTRAVENOUS at 03:08

## 2025-08-26 ENCOUNTER — TELEPHONE (OUTPATIENT)
Dept: OPHTHALMOLOGY | Facility: CLINIC | Age: 70
End: 2025-08-26
Payer: MEDICARE

## 2025-08-26 ENCOUNTER — OFFICE VISIT (OUTPATIENT)
Dept: UROLOGY | Facility: CLINIC | Age: 70
End: 2025-08-26
Payer: MEDICARE

## 2025-08-26 VITALS
HEIGHT: 73 IN | BODY MASS INDEX: 25.19 KG/M2 | SYSTOLIC BLOOD PRESSURE: 170 MMHG | HEART RATE: 47 BPM | DIASTOLIC BLOOD PRESSURE: 62 MMHG | WEIGHT: 190.06 LBS

## 2025-08-26 DIAGNOSIS — Z86.73 HISTORY OF CVA (CEREBROVASCULAR ACCIDENT): ICD-10-CM

## 2025-08-26 DIAGNOSIS — N18.4 HYPERTENSION ASSOCIATED WITH STAGE 4 CHRONIC KIDNEY DISEASE DUE TO TYPE 2 DIABETES MELLITUS: ICD-10-CM

## 2025-08-26 DIAGNOSIS — E11.22 HYPERTENSION ASSOCIATED WITH STAGE 4 CHRONIC KIDNEY DISEASE DUE TO TYPE 2 DIABETES MELLITUS: ICD-10-CM

## 2025-08-26 DIAGNOSIS — D69.6 THROMBOCYTOPENIA: ICD-10-CM

## 2025-08-26 DIAGNOSIS — R00.0 WIDE-COMPLEX TACHYCARDIA: ICD-10-CM

## 2025-08-26 DIAGNOSIS — N28.89 BILATERAL RENAL MASSES: Primary | ICD-10-CM

## 2025-08-26 DIAGNOSIS — Z79.4 TYPE 2 DIABETES MELLITUS WITH STAGE 3A CHRONIC KIDNEY DISEASE, WITH LONG-TERM CURRENT USE OF INSULIN: ICD-10-CM

## 2025-08-26 DIAGNOSIS — Z94.0 STATUS POST DECEASED-DONOR KIDNEY TRANSPLANTATION: ICD-10-CM

## 2025-08-26 DIAGNOSIS — E11.22 TYPE 2 DIABETES MELLITUS WITH STAGE 3A CHRONIC KIDNEY DISEASE, WITH LONG-TERM CURRENT USE OF INSULIN: ICD-10-CM

## 2025-08-26 DIAGNOSIS — I48.0 PAROXYSMAL ATRIAL FIBRILLATION: ICD-10-CM

## 2025-08-26 DIAGNOSIS — N18.4 CHRONIC KIDNEY DISEASE (CKD), STAGE IV (SEVERE): ICD-10-CM

## 2025-08-26 DIAGNOSIS — I73.9 PVD (PERIPHERAL VASCULAR DISEASE): ICD-10-CM

## 2025-08-26 DIAGNOSIS — I12.9 HYPERTENSION ASSOCIATED WITH STAGE 4 CHRONIC KIDNEY DISEASE DUE TO TYPE 2 DIABETES MELLITUS: ICD-10-CM

## 2025-08-26 DIAGNOSIS — I50.33 ACUTE ON CHRONIC DIASTOLIC CONGESTIVE HEART FAILURE: ICD-10-CM

## 2025-08-26 DIAGNOSIS — I50.42 CHRONIC COMBINED SYSTOLIC (CONGESTIVE) AND DIASTOLIC (CONGESTIVE) HEART FAILURE: ICD-10-CM

## 2025-08-26 DIAGNOSIS — N18.31 TYPE 2 DIABETES MELLITUS WITH STAGE 3A CHRONIC KIDNEY DISEASE, WITH LONG-TERM CURRENT USE OF INSULIN: ICD-10-CM

## 2025-08-26 PROCEDURE — 3078F DIAST BP <80 MM HG: CPT | Mod: CPTII,HCNC,S$GLB, | Performed by: UROLOGY

## 2025-08-26 PROCEDURE — 1101F PT FALLS ASSESS-DOCD LE1/YR: CPT | Mod: CPTII,HCNC,S$GLB, | Performed by: UROLOGY

## 2025-08-26 PROCEDURE — 99999 PR PBB SHADOW E&M-EST. PATIENT-LVL IV: CPT | Mod: PBBFAC,HCNC,, | Performed by: UROLOGY

## 2025-08-26 PROCEDURE — G2211 COMPLEX E/M VISIT ADD ON: HCPCS | Mod: HCNC,S$GLB,, | Performed by: UROLOGY

## 2025-08-26 PROCEDURE — 1126F AMNT PAIN NOTED NONE PRSNT: CPT | Mod: CPTII,HCNC,S$GLB, | Performed by: UROLOGY

## 2025-08-26 PROCEDURE — 3008F BODY MASS INDEX DOCD: CPT | Mod: CPTII,HCNC,S$GLB, | Performed by: UROLOGY

## 2025-08-26 PROCEDURE — 99215 OFFICE O/P EST HI 40 MIN: CPT | Mod: HCNC,S$GLB,, | Performed by: UROLOGY

## 2025-08-26 PROCEDURE — 1160F RVW MEDS BY RX/DR IN RCRD: CPT | Mod: CPTII,HCNC,S$GLB, | Performed by: UROLOGY

## 2025-08-26 PROCEDURE — 3288F FALL RISK ASSESSMENT DOCD: CPT | Mod: CPTII,HCNC,S$GLB, | Performed by: UROLOGY

## 2025-08-26 PROCEDURE — 3077F SYST BP >= 140 MM HG: CPT | Mod: CPTII,HCNC,S$GLB, | Performed by: UROLOGY

## 2025-08-26 PROCEDURE — 3066F NEPHROPATHY DOC TX: CPT | Mod: CPTII,HCNC,S$GLB, | Performed by: UROLOGY

## 2025-08-26 PROCEDURE — 4010F ACE/ARB THERAPY RXD/TAKEN: CPT | Mod: CPTII,HCNC,S$GLB, | Performed by: UROLOGY

## 2025-08-26 PROCEDURE — 1159F MED LIST DOCD IN RCRD: CPT | Mod: CPTII,HCNC,S$GLB, | Performed by: UROLOGY

## 2025-08-26 PROCEDURE — 3044F HG A1C LEVEL LT 7.0%: CPT | Mod: CPTII,HCNC,S$GLB, | Performed by: UROLOGY

## 2025-08-28 ENCOUNTER — OUTPATIENT CASE MANAGEMENT (OUTPATIENT)
Dept: ADMINISTRATIVE | Facility: OTHER | Age: 70
End: 2025-08-28
Payer: MEDICARE

## 2025-09-02 ENCOUNTER — TELEPHONE (OUTPATIENT)
Dept: UROLOGY | Facility: CLINIC | Age: 70
End: 2025-09-02
Payer: MEDICARE

## 2025-09-02 DIAGNOSIS — N18.4 CHRONIC KIDNEY DISEASE (CKD), STAGE IV (SEVERE): ICD-10-CM

## 2025-09-02 DIAGNOSIS — I63.411 EMBOLIC STROKE INVOLVING RIGHT MIDDLE CEREBRAL ARTERY: ICD-10-CM

## 2025-09-02 DIAGNOSIS — N28.89 OTHER SPECIFIED DISORDERS OF KIDNEY AND URETER: ICD-10-CM

## 2025-09-02 DIAGNOSIS — N28.89 BILATERAL RENAL MASSES: Primary | ICD-10-CM

## 2025-09-02 RX ORDER — CLONIDINE HYDROCHLORIDE 0.1 MG/1
0.2 TABLET ORAL NIGHTLY
Qty: 60 TABLET | Refills: 11 | Status: SHIPPED | OUTPATIENT
Start: 2025-09-02 | End: 2026-09-02

## 2025-09-04 ENCOUNTER — TELEPHONE (OUTPATIENT)
Dept: INTERNAL MEDICINE | Facility: CLINIC | Age: 70
End: 2025-09-04
Payer: MEDICARE

## 2025-09-04 DIAGNOSIS — E11.42 DIABETIC POLYNEUROPATHY ASSOCIATED WITH TYPE 2 DIABETES MELLITUS: Primary | ICD-10-CM

## 2025-09-04 DIAGNOSIS — I63.411 EMBOLIC STROKE INVOLVING RIGHT MIDDLE CEREBRAL ARTERY: ICD-10-CM

## 2025-09-04 DIAGNOSIS — Z23 NEED FOR COVID-19 VACCINE: ICD-10-CM

## 2025-09-05 ENCOUNTER — OFFICE VISIT (OUTPATIENT)
Dept: NEPHROLOGY | Facility: CLINIC | Age: 70
End: 2025-09-05
Payer: MEDICARE

## 2025-09-05 VITALS
WEIGHT: 182 LBS | DIASTOLIC BLOOD PRESSURE: 66 MMHG | SYSTOLIC BLOOD PRESSURE: 143 MMHG | HEART RATE: 47 BPM | HEIGHT: 73 IN | BODY MASS INDEX: 24.12 KG/M2

## 2025-09-05 DIAGNOSIS — R00.1 BRADYCARDIA: ICD-10-CM

## 2025-09-05 DIAGNOSIS — E11.22 TYPE 2 DIABETES MELLITUS WITH STAGE 4 CHRONIC KIDNEY DISEASE, WITH LONG-TERM CURRENT USE OF INSULIN: ICD-10-CM

## 2025-09-05 DIAGNOSIS — F32.A DEPRESSION, UNSPECIFIED DEPRESSION TYPE: ICD-10-CM

## 2025-09-05 DIAGNOSIS — D63.1 ANEMIA IN STAGE 4 CHRONIC KIDNEY DISEASE: Primary | ICD-10-CM

## 2025-09-05 DIAGNOSIS — Z94.0 STATUS POST DECEASED-DONOR KIDNEY TRANSPLANTATION: ICD-10-CM

## 2025-09-05 DIAGNOSIS — D69.6 THROMBOCYTOPENIA, UNSPECIFIED: ICD-10-CM

## 2025-09-05 DIAGNOSIS — R79.89 ELEVATED BRAIN NATRIURETIC PEPTIDE (BNP) LEVEL: ICD-10-CM

## 2025-09-05 DIAGNOSIS — Z96.41 INSULIN PUMP IN PLACE: ICD-10-CM

## 2025-09-05 DIAGNOSIS — Z79.01 ANTICOAGULANT LONG-TERM USE: ICD-10-CM

## 2025-09-05 DIAGNOSIS — D63.1 ANEMIA IN STAGE 4 CHRONIC KIDNEY DISEASE: ICD-10-CM

## 2025-09-05 DIAGNOSIS — F41.9 ANXIETY: ICD-10-CM

## 2025-09-05 DIAGNOSIS — Z86.0100 HISTORY OF COLON POLYPS: ICD-10-CM

## 2025-09-05 DIAGNOSIS — N13.8 BPH WITH URINARY OBSTRUCTION: ICD-10-CM

## 2025-09-05 DIAGNOSIS — I73.9 CLAUDICATION OF BOTH LOWER EXTREMITIES: ICD-10-CM

## 2025-09-05 DIAGNOSIS — E11.42 DIABETIC POLYNEUROPATHY ASSOCIATED WITH TYPE 2 DIABETES MELLITUS: ICD-10-CM

## 2025-09-05 DIAGNOSIS — N18.4 CHRONIC KIDNEY DISEASE (CKD), STAGE IV (SEVERE): ICD-10-CM

## 2025-09-05 DIAGNOSIS — Z29.89 PROPHYLACTIC IMMUNOTHERAPY: ICD-10-CM

## 2025-09-05 DIAGNOSIS — D68.69 HYPERCOAGULABLE STATE DUE TO PAROXYSMAL ATRIAL FIBRILLATION: ICD-10-CM

## 2025-09-05 DIAGNOSIS — N28.89 BILATERAL RENAL MASSES: ICD-10-CM

## 2025-09-05 DIAGNOSIS — R00.0 WIDE-COMPLEX TACHYCARDIA: ICD-10-CM

## 2025-09-05 DIAGNOSIS — R06.02 SHORTNESS OF BREATH: ICD-10-CM

## 2025-09-05 DIAGNOSIS — T45.1X5D ADVERSE EFFECT OF IMMUNOSUPPRESSIVE DRUG, SUBSEQUENT ENCOUNTER: ICD-10-CM

## 2025-09-05 DIAGNOSIS — N18.4 ANEMIA IN STAGE 4 CHRONIC KIDNEY DISEASE: ICD-10-CM

## 2025-09-05 DIAGNOSIS — D64.9 SYMPTOMATIC ANEMIA: ICD-10-CM

## 2025-09-05 DIAGNOSIS — I12.9 HYPERTENSION ASSOCIATED WITH STAGE 4 CHRONIC KIDNEY DISEASE DUE TO TYPE 2 DIABETES MELLITUS: ICD-10-CM

## 2025-09-05 DIAGNOSIS — I27.20 PULMONARY HYPERTENSION: ICD-10-CM

## 2025-09-05 DIAGNOSIS — Z86.73 HISTORY OF CVA (CEREBROVASCULAR ACCIDENT): ICD-10-CM

## 2025-09-05 DIAGNOSIS — N18.4 ANEMIA IN STAGE 4 CHRONIC KIDNEY DISEASE: Primary | ICD-10-CM

## 2025-09-05 DIAGNOSIS — M25.471 ANKLE EDEMA, BILATERAL: ICD-10-CM

## 2025-09-05 DIAGNOSIS — T38.0X5D ADVERSE EFFECT OF GLUCOCORTICOID OR SYNTHETIC ANALOGUE, SUBSEQUENT ENCOUNTER: ICD-10-CM

## 2025-09-05 DIAGNOSIS — N18.4 TYPE 2 DIABETES MELLITUS WITH STAGE 4 CHRONIC KIDNEY DISEASE, WITH LONG-TERM CURRENT USE OF INSULIN: ICD-10-CM

## 2025-09-05 DIAGNOSIS — N48.6 PEYRONIE'S DISEASE: ICD-10-CM

## 2025-09-05 DIAGNOSIS — E87.20 METABOLIC ACIDOSIS: ICD-10-CM

## 2025-09-05 DIAGNOSIS — Z79.899 IMMUNOCOMPROMISED STATE DUE TO DRUG THERAPY: ICD-10-CM

## 2025-09-05 DIAGNOSIS — M25.472 ANKLE EDEMA, BILATERAL: ICD-10-CM

## 2025-09-05 DIAGNOSIS — I48.0 HYPERCOAGULABLE STATE DUE TO PAROXYSMAL ATRIAL FIBRILLATION: ICD-10-CM

## 2025-09-05 DIAGNOSIS — N40.1 BPH WITH URINARY OBSTRUCTION: ICD-10-CM

## 2025-09-05 DIAGNOSIS — N18.4 HYPERTENSION ASSOCIATED WITH STAGE 4 CHRONIC KIDNEY DISEASE DUE TO TYPE 2 DIABETES MELLITUS: ICD-10-CM

## 2025-09-05 DIAGNOSIS — E11.22 HYPERTENSION ASSOCIATED WITH STAGE 4 CHRONIC KIDNEY DISEASE DUE TO TYPE 2 DIABETES MELLITUS: ICD-10-CM

## 2025-09-05 DIAGNOSIS — D84.821 IMMUNOCOMPROMISED STATE DUE TO DRUG THERAPY: ICD-10-CM

## 2025-09-05 DIAGNOSIS — Z94.0 S/P KIDNEY TRANSPLANT: ICD-10-CM

## 2025-09-05 DIAGNOSIS — Z79.4 TYPE 2 DIABETES MELLITUS WITH STAGE 4 CHRONIC KIDNEY DISEASE, WITH LONG-TERM CURRENT USE OF INSULIN: ICD-10-CM

## 2025-09-05 DIAGNOSIS — I73.9 PAD (PERIPHERAL ARTERY DISEASE): ICD-10-CM

## 2025-09-05 DIAGNOSIS — T86.10 COMPLICATION OF TRANSPLANTED KIDNEY, UNSPECIFIED COMPLICATION: Primary | ICD-10-CM

## 2025-09-05 DIAGNOSIS — I63.411 EMBOLIC STROKE INVOLVING RIGHT MIDDLE CEREBRAL ARTERY: ICD-10-CM

## 2025-09-05 DIAGNOSIS — N52.01 ERECTILE DYSFUNCTION DUE TO ARTERIAL INSUFFICIENCY: ICD-10-CM

## 2025-09-05 DIAGNOSIS — I48.92 ATRIAL FLUTTER, UNSPECIFIED TYPE: ICD-10-CM

## 2025-09-05 DIAGNOSIS — I50.42 CHRONIC COMBINED SYSTOLIC (CONGESTIVE) AND DIASTOLIC (CONGESTIVE) HEART FAILURE: ICD-10-CM

## 2025-09-05 PROBLEM — A04.8 H. PYLORI INFECTION: Status: RESOLVED | Noted: 2024-04-01 | Resolved: 2025-09-05

## 2025-09-05 PROBLEM — U07.1 COVID-19 VIRUS INFECTION: Status: RESOLVED | Noted: 2022-07-08 | Resolved: 2025-09-05

## 2025-09-05 PROCEDURE — 99999 PR PBB SHADOW E&M-EST. PATIENT-LVL IV: CPT | Mod: PBBFAC,HCNC,, | Performed by: INTERNAL MEDICINE

## 2025-09-06 PROBLEM — T86.10 COMPLICATION OF TRANSPLANTED KIDNEY: Status: ACTIVE | Noted: 2025-09-06

## (undated) DEVICE — CATH TRICUSPID HALO XP 7FRX110

## (undated) DEVICE — PACK EP DRAPE OMC

## (undated) DEVICE — SYR LUER LOCK 1CC

## (undated) DEVICE — LINE PRESSURE MONITORING 96IN

## (undated) DEVICE — GUIDEWIRE TORAY INOUE

## (undated) DEVICE — CATH PENTARY F 2-6-2MM 115CM

## (undated) DEVICE — PAD DEFIB CADENCE ADULT R2

## (undated) DEVICE — ELECTRODE REM PLYHSV RETURN 9

## (undated) DEVICE — Device

## (undated) DEVICE — SHEATH INTRODUCER 9FR 11CM

## (undated) DEVICE — KIT PROBE COVER WITH GEL

## (undated) DEVICE — R CATH ACUSON ACUNAV 8FR

## (undated) DEVICE — PAD RADI FEMORAL

## (undated) DEVICE — SOL BETADINE 5%

## (undated) DEVICE — BASIN SPLASH SHLD W/PAD BLUE

## (undated) DEVICE — CATH QDOT MICRO BI DIR DF CRV

## (undated) DEVICE — NDL TRNSSPTL BRK-1 18GA 98CM

## (undated) DEVICE — DRESSING TRANS 2X2 TEGADERM

## (undated) DEVICE — DRAPE OPHTHALMIC 48X62 FEN

## (undated) DEVICE — INTRO FAST-CATH SL1 8.5FR 63CM

## (undated) DEVICE — CATH SOUNDSTAR ECO SMS 8FR

## (undated) DEVICE — DUOVISC

## (undated) DEVICE — COVER PRB TRNSDUC 7.6X183CM

## (undated) DEVICE — SHEATH HEMOSTASIS 8.5FR

## (undated) DEVICE — R CATH BIDIRECTIONL DF CRV 7FR

## (undated) DEVICE — SET SMARTABLATE IRR TUBE

## (undated) DEVICE — CATH THERMOCOOL SMTCH SF D F

## (undated) DEVICE — INTRO 8.5FR 63CM SRO

## (undated) DEVICE — GLOVE BIOGEL ECLIPSE SZ 6.5

## (undated) DEVICE — INTRODUCER HEMOSTASIS 7.5F

## (undated) DEVICE — NDL TRNSSPTL BRK-1 18GA 71CM

## (undated) DEVICE — PATCH CARTO REFERENCE

## (undated) DEVICE — INTRO AGILIS MED CRL 8.5F 71CM